# Patient Record
Sex: FEMALE | Race: WHITE | Employment: OTHER | URBAN - NONMETROPOLITAN AREA
[De-identification: names, ages, dates, MRNs, and addresses within clinical notes are randomized per-mention and may not be internally consistent; named-entity substitution may affect disease eponyms.]

---

## 2017-01-09 ENCOUNTER — TELEPHONE (OUTPATIENT)
Dept: PRIMARY CARE CLINIC | Age: 57
End: 2017-01-09

## 2017-01-11 ENCOUNTER — HOSPITAL ENCOUNTER (OUTPATIENT)
Dept: WOMENS IMAGING | Age: 57
Discharge: HOME | End: 2017-01-11
Payer: MEDICAID

## 2017-01-11 DIAGNOSIS — Z12.31 SCREENING MAMMOGRAM, ENCOUNTER FOR: ICD-10-CM

## 2017-01-11 PROCEDURE — 77063 BREAST TOMOSYNTHESIS BI: CPT

## 2017-03-13 ENCOUNTER — OFFICE VISIT (OUTPATIENT)
Dept: PRIMARY CARE CLINIC | Age: 57
End: 2017-03-13
Payer: MEDICAID

## 2017-03-13 VITALS
OXYGEN SATURATION: 94 % | HEART RATE: 88 BPM | DIASTOLIC BLOOD PRESSURE: 86 MMHG | BODY MASS INDEX: 27.38 KG/M2 | WEIGHT: 135.8 LBS | SYSTOLIC BLOOD PRESSURE: 124 MMHG | HEIGHT: 59 IN

## 2017-03-13 DIAGNOSIS — I10 ESSENTIAL HYPERTENSION: ICD-10-CM

## 2017-03-13 DIAGNOSIS — E11.39 TYPE 2 DIABETES MELLITUS WITH OTHER OPHTHALMIC COMPLICATION, UNSPECIFIED LONG TERM INSULIN USE STATUS: ICD-10-CM

## 2017-03-13 DIAGNOSIS — N18.4 CKD (CHRONIC KIDNEY DISEASE), STAGE IV (HCC): Primary | ICD-10-CM

## 2017-03-13 LAB — HBA1C MFR BLD: 6.9 %

## 2017-03-13 PROCEDURE — 83036 HEMOGLOBIN GLYCOSYLATED A1C: CPT | Performed by: FAMILY MEDICINE

## 2017-03-13 PROCEDURE — 99214 OFFICE O/P EST MOD 30 MIN: CPT | Performed by: FAMILY MEDICINE

## 2017-03-13 RX ORDER — CALCIUM CARBONATE 200(500)MG
1 TABLET,CHEWABLE ORAL 3 TIMES DAILY
COMMUNITY
End: 2018-02-02

## 2017-03-13 ASSESSMENT — ENCOUNTER SYMPTOMS
COUGH: 0
SHORTNESS OF BREATH: 0

## 2017-05-08 ENCOUNTER — OFFICE VISIT (OUTPATIENT)
Dept: OTOLARYNGOLOGY | Facility: CLINIC | Age: 57
End: 2017-05-08

## 2017-05-08 VITALS — TEMPERATURE: 96.2 F | RESPIRATION RATE: 18 BRPM | HEIGHT: 56 IN | BODY MASS INDEX: 29.92 KG/M2 | WEIGHT: 133 LBS

## 2017-05-08 DIAGNOSIS — H65.33 CHRONIC MUCOID OTITIS MEDIA OF BOTH EARS: ICD-10-CM

## 2017-05-08 DIAGNOSIS — H73.813 ATROPHIC FLACCID TYMPANIC MEMBRANE OF BOTH EARS: Primary | ICD-10-CM

## 2017-05-08 DIAGNOSIS — H69.83 EUSTACHIAN TUBE DYSFUNCTION, BILATERAL: ICD-10-CM

## 2017-05-08 PROCEDURE — 99213 OFFICE O/P EST LOW 20 MIN: CPT | Performed by: PHYSICIAN ASSISTANT

## 2017-05-08 RX ORDER — FLUTICASONE PROPIONATE 50 MCG
SPRAY, SUSPENSION (ML) NASAL
Refills: 3 | Status: ON HOLD | COMMUNITY
Start: 2017-04-28 | End: 2019-09-04

## 2017-05-08 RX ORDER — LIDOCAINE AND PRILOCAINE 25; 25 MG/G; MG/G
CREAM TOPICAL
Refills: 0 | Status: ON HOLD | COMMUNITY
Start: 2017-04-21 | End: 2019-09-04

## 2017-05-12 ENCOUNTER — PREP FOR PROCEDURE (OUTPATIENT)
Dept: VASCULAR SURGERY | Age: 57
End: 2017-05-12

## 2017-05-12 RX ORDER — ONDANSETRON 2 MG/ML
4 INJECTION INTRAMUSCULAR; INTRAVENOUS EVERY 6 HOURS PRN
Status: CANCELLED | OUTPATIENT
Start: 2017-05-12

## 2017-05-12 RX ORDER — ASPIRIN 81 MG/1
81 TABLET ORAL ONCE
Status: CANCELLED | OUTPATIENT
Start: 2017-05-12 | End: 2017-05-12

## 2017-05-12 RX ORDER — VANCOMYCIN HYDROCHLORIDE 1 G/200ML
1000 INJECTION, SOLUTION INTRAVENOUS
Status: CANCELLED | OUTPATIENT
Start: 2017-05-12 | End: 2017-05-12

## 2017-05-12 RX ORDER — SODIUM CHLORIDE 0.9 % (FLUSH) 0.9 %
10 SYRINGE (ML) INJECTION PRN
Status: CANCELLED | OUTPATIENT
Start: 2017-05-12

## 2017-05-12 RX ORDER — SODIUM CHLORIDE 9 MG/ML
INJECTION, SOLUTION INTRAVENOUS CONTINUOUS
Status: CANCELLED | OUTPATIENT
Start: 2017-05-12

## 2017-05-15 ENCOUNTER — HOSPITAL ENCOUNTER (OUTPATIENT)
Dept: INTERVENTIONAL RADIOLOGY/VASCULAR | Age: 57
Discharge: HOME | End: 2017-05-15
Payer: MEDICAID

## 2017-05-15 VITALS
WEIGHT: 132 LBS | HEART RATE: 88 BPM | HEIGHT: 59 IN | TEMPERATURE: 97.8 F | OXYGEN SATURATION: 98 % | DIASTOLIC BLOOD PRESSURE: 65 MMHG | RESPIRATION RATE: 9 BRPM | BODY MASS INDEX: 26.61 KG/M2 | SYSTOLIC BLOOD PRESSURE: 158 MMHG

## 2017-05-15 DIAGNOSIS — N18.4 CKD (CHRONIC KIDNEY DISEASE), STAGE IV (HCC): ICD-10-CM

## 2017-05-15 PROCEDURE — 6360000004 HC RX CONTRAST MEDICATION: Performed by: SURGERY

## 2017-05-15 PROCEDURE — 6360000002 HC RX W HCPCS: Performed by: SURGERY

## 2017-05-15 PROCEDURE — 36901 INTRO CATH DIALYSIS CIRCUIT: CPT | Performed by: SURGERY

## 2017-05-15 PROCEDURE — 2500000003 HC RX 250 WO HCPCS: Performed by: SURGERY

## 2017-05-15 PROCEDURE — 2580000003 HC RX 258: Performed by: SURGERY

## 2017-05-15 RX ORDER — SODIUM CHLORIDE 9 MG/ML
INJECTION, SOLUTION INTRAVENOUS CONTINUOUS
Status: DISCONTINUED | OUTPATIENT
Start: 2017-05-15 | End: 2017-05-17 | Stop reason: HOSPADM

## 2017-05-15 RX ORDER — ACETAMINOPHEN 325 MG/1
650 TABLET ORAL EVERY 4 HOURS PRN
Status: DISCONTINUED | OUTPATIENT
Start: 2017-05-15 | End: 2017-05-17 | Stop reason: HOSPADM

## 2017-05-15 RX ORDER — ONDANSETRON 2 MG/ML
4 INJECTION INTRAMUSCULAR; INTRAVENOUS EVERY 8 HOURS PRN
Status: DISCONTINUED | OUTPATIENT
Start: 2017-05-15 | End: 2017-05-17 | Stop reason: HOSPADM

## 2017-05-15 RX ORDER — LIDOCAINE HYDROCHLORIDE 20 MG/ML
INJECTION, SOLUTION INFILTRATION; PERINEURAL
Status: COMPLETED | OUTPATIENT
Start: 2017-05-15 | End: 2017-05-15

## 2017-05-15 RX ORDER — HYDROCODONE BITARTRATE AND ACETAMINOPHEN 5; 325 MG/1; MG/1
2 TABLET ORAL EVERY 4 HOURS PRN
Status: DISCONTINUED | OUTPATIENT
Start: 2017-05-15 | End: 2017-05-17 | Stop reason: HOSPADM

## 2017-05-15 RX ORDER — ASPIRIN 81 MG/1
81 TABLET ORAL ONCE
Status: DISCONTINUED | OUTPATIENT
Start: 2017-05-15 | End: 2017-05-17 | Stop reason: HOSPADM

## 2017-05-15 RX ORDER — FENTANYL CITRATE 50 UG/ML
INJECTION, SOLUTION INTRAMUSCULAR; INTRAVENOUS
Status: COMPLETED | OUTPATIENT
Start: 2017-05-15 | End: 2017-05-15

## 2017-05-15 RX ORDER — VANCOMYCIN HYDROCHLORIDE 1 G/200ML
1000 INJECTION, SOLUTION INTRAVENOUS
Status: COMPLETED | OUTPATIENT
Start: 2017-05-15 | End: 2017-05-15

## 2017-05-15 RX ORDER — HEPARIN SODIUM 5000 [USP'U]/ML
INJECTION, SOLUTION INTRAVENOUS; SUBCUTANEOUS
Status: COMPLETED | OUTPATIENT
Start: 2017-05-15 | End: 2017-05-15

## 2017-05-15 RX ORDER — SODIUM CHLORIDE 0.9 % (FLUSH) 0.9 %
10 SYRINGE (ML) INJECTION PRN
Status: DISCONTINUED | OUTPATIENT
Start: 2017-05-15 | End: 2017-05-17 | Stop reason: HOSPADM

## 2017-05-15 RX ORDER — ASPIRIN 81 MG/1
81 TABLET ORAL DAILY
COMMUNITY

## 2017-05-15 RX ORDER — ONDANSETRON 2 MG/ML
4 INJECTION INTRAMUSCULAR; INTRAVENOUS EVERY 6 HOURS PRN
Status: DISCONTINUED | OUTPATIENT
Start: 2017-05-15 | End: 2017-05-15

## 2017-05-15 RX ORDER — MIDAZOLAM HYDROCHLORIDE 1 MG/ML
INJECTION INTRAMUSCULAR; INTRAVENOUS
Status: COMPLETED | OUTPATIENT
Start: 2017-05-15 | End: 2017-05-15

## 2017-05-15 RX ORDER — HYDROCODONE BITARTRATE AND ACETAMINOPHEN 5; 325 MG/1; MG/1
1 TABLET ORAL EVERY 4 HOURS PRN
Status: DISCONTINUED | OUTPATIENT
Start: 2017-05-15 | End: 2017-05-17 | Stop reason: HOSPADM

## 2017-05-15 RX ADMIN — VANCOMYCIN HYDROCHLORIDE 1000 MG: 1 INJECTION, SOLUTION INTRAVENOUS at 13:07

## 2017-05-15 RX ADMIN — HEPARIN SODIUM 5000 UNITS: 5000 INJECTION, SOLUTION INTRAVENOUS; SUBCUTANEOUS at 13:10

## 2017-05-15 RX ADMIN — IOVERSOL 10 ML: 678 INJECTION INTRA-ARTERIAL; INTRAVENOUS at 13:19

## 2017-05-15 RX ADMIN — FENTANYL CITRATE 25 MCG: 50 INJECTION INTRAMUSCULAR; INTRAVENOUS at 13:13

## 2017-05-15 RX ADMIN — MIDAZOLAM HYDROCHLORIDE 1 MG: 1 INJECTION, SOLUTION INTRAMUSCULAR; INTRAVENOUS at 13:13

## 2017-05-15 RX ADMIN — SODIUM CHLORIDE: 9 INJECTION, SOLUTION INTRAVENOUS at 12:19

## 2017-05-15 RX ADMIN — LIDOCAINE HYDROCHLORIDE 10 ML: 20 INJECTION, SOLUTION INFILTRATION; PERINEURAL at 13:16

## 2017-05-17 ENCOUNTER — TELEPHONE (OUTPATIENT)
Dept: VASCULAR SURGERY | Age: 57
End: 2017-05-17

## 2017-06-20 ENCOUNTER — TELEPHONE (OUTPATIENT)
Dept: PRIMARY CARE CLINIC | Age: 57
End: 2017-06-20

## 2017-07-05 ENCOUNTER — OFFICE VISIT (OUTPATIENT)
Dept: PRIMARY CARE CLINIC | Age: 57
End: 2017-07-05
Payer: MEDICARE

## 2017-07-05 VITALS
DIASTOLIC BLOOD PRESSURE: 68 MMHG | OXYGEN SATURATION: 92 % | TEMPERATURE: 97.7 F | HEIGHT: 59 IN | WEIGHT: 134 LBS | SYSTOLIC BLOOD PRESSURE: 116 MMHG | HEART RATE: 90 BPM | BODY MASS INDEX: 27.01 KG/M2

## 2017-07-05 DIAGNOSIS — H40.9 GLAUCOMA, UNSPECIFIED GLAUCOMA, UNSPECIFIED LATERALITY: ICD-10-CM

## 2017-07-05 DIAGNOSIS — E11.39 TYPE 2 DIABETES MELLITUS WITH OTHER OPHTHALMIC COMPLICATION, UNSPECIFIED LONG TERM INSULIN USE STATUS: Primary | ICD-10-CM

## 2017-07-05 DIAGNOSIS — I10 ESSENTIAL HYPERTENSION: ICD-10-CM

## 2017-07-05 DIAGNOSIS — E11.21 DIABETIC NEPHROPATHY ASSOCIATED WITH TYPE 2 DIABETES MELLITUS (HCC): ICD-10-CM

## 2017-07-05 LAB — HBA1C MFR BLD: 6.9 %

## 2017-07-05 PROCEDURE — 83036 HEMOGLOBIN GLYCOSYLATED A1C: CPT | Performed by: FAMILY MEDICINE

## 2017-07-05 PROCEDURE — 99214 OFFICE O/P EST MOD 30 MIN: CPT | Performed by: FAMILY MEDICINE

## 2017-07-05 ASSESSMENT — ENCOUNTER SYMPTOMS
COUGH: 0
SHORTNESS OF BREATH: 0

## 2017-09-11 RX ORDER — PEN NEEDLE, DIABETIC 31 GX5/16"
NEEDLE, DISPOSABLE MISCELLANEOUS
Qty: 100 EACH | Refills: 0 | Status: SHIPPED | OUTPATIENT
Start: 2017-09-11 | End: 2017-12-31 | Stop reason: SDUPTHER

## 2017-09-27 ENCOUNTER — LAB REQUISITION (OUTPATIENT)
Dept: LAB | Facility: HOSPITAL | Age: 57
End: 2017-09-27

## 2017-09-27 DIAGNOSIS — Z00.00 ENCOUNTER FOR GENERAL ADULT MEDICAL EXAMINATION WITHOUT ABNORMAL FINDINGS: ICD-10-CM

## 2017-09-28 LAB
LAB AP CASE REPORT: NORMAL
Lab: NORMAL
PATH REPORT.FINAL DX SPEC: NORMAL

## 2017-10-09 ENCOUNTER — OFFICE VISIT (OUTPATIENT)
Dept: PRIMARY CARE CLINIC | Age: 57
End: 2017-10-09
Payer: MEDICARE

## 2017-10-09 VITALS
HEIGHT: 59 IN | BODY MASS INDEX: 27.38 KG/M2 | DIASTOLIC BLOOD PRESSURE: 80 MMHG | SYSTOLIC BLOOD PRESSURE: 122 MMHG | OXYGEN SATURATION: 97 % | WEIGHT: 135.8 LBS | HEART RATE: 90 BPM | TEMPERATURE: 97.1 F

## 2017-10-09 DIAGNOSIS — I10 ESSENTIAL HYPERTENSION: ICD-10-CM

## 2017-10-09 DIAGNOSIS — N18.5 CHRONIC KIDNEY DISEASE, STAGE V (HCC): Primary | ICD-10-CM

## 2017-10-09 DIAGNOSIS — Z72.0 TOBACCO ABUSE: ICD-10-CM

## 2017-10-09 DIAGNOSIS — E11.39 TYPE 2 DIABETES MELLITUS WITH OTHER OPHTHALMIC COMPLICATION, UNSPECIFIED LONG TERM INSULIN USE STATUS: ICD-10-CM

## 2017-10-09 PROCEDURE — G8484 FLU IMMUNIZE NO ADMIN: HCPCS | Performed by: FAMILY MEDICINE

## 2017-10-09 PROCEDURE — 99214 OFFICE O/P EST MOD 30 MIN: CPT | Performed by: FAMILY MEDICINE

## 2017-10-09 PROCEDURE — 3017F COLORECTAL CA SCREEN DOC REV: CPT | Performed by: FAMILY MEDICINE

## 2017-10-09 PROCEDURE — G8427 DOCREV CUR MEDS BY ELIG CLIN: HCPCS | Performed by: FAMILY MEDICINE

## 2017-10-09 PROCEDURE — G8417 CALC BMI ABV UP PARAM F/U: HCPCS | Performed by: FAMILY MEDICINE

## 2017-10-09 PROCEDURE — 3046F HEMOGLOBIN A1C LEVEL >9.0%: CPT | Performed by: FAMILY MEDICINE

## 2017-10-09 PROCEDURE — 3014F SCREEN MAMMO DOC REV: CPT | Performed by: FAMILY MEDICINE

## 2017-10-09 PROCEDURE — 4004F PT TOBACCO SCREEN RCVD TLK: CPT | Performed by: FAMILY MEDICINE

## 2017-10-09 RX ORDER — VARENICLINE TARTRATE 25 MG
KIT ORAL
Qty: 53 TABLET | Refills: 0 | Status: SHIPPED | OUTPATIENT
Start: 2017-10-09 | End: 2018-01-30

## 2017-10-09 RX ORDER — PRAVASTATIN SODIUM 10 MG
TABLET ORAL
Qty: 30 TABLET | Refills: 11 | Status: SHIPPED | OUTPATIENT
Start: 2017-10-09 | End: 2018-09-20 | Stop reason: SDUPTHER

## 2017-10-09 ASSESSMENT — ENCOUNTER SYMPTOMS
COUGH: 0
BLOOD IN STOOL: 0
DIARRHEA: 0
EYE REDNESS: 0
NAUSEA: 0
SHORTNESS OF BREATH: 0
EYE ITCHING: 0
RESPIRATORY NEGATIVE: 1
WHEEZING: 0
SORE THROAT: 0
BACK PAIN: 0
COLOR CHANGE: 0
GASTROINTESTINAL NEGATIVE: 1
RHINORRHEA: 0
EYES NEGATIVE: 1
VOMITING: 0

## 2017-10-09 NOTE — PROGRESS NOTES
Debby Villalpando is a 59 y.o. female  Chief Complaint   Patient presents with    Follow-up     3 months    Diabetes    Hypertension       HPI   Debby Villalpando presents to the office follow-up of diabetes, hypertension, end-stage renal disease and hyperlipidemia. Treatment Adherence:   Medication compliance:  compliant all of the time  Diet compliance:  compliant most of the time  Weight trend: stable  Current exercise: walks occasionally  Barriers: impairment:  physical: especially after dialysis    Diabetes Mellitus Type 2: Current symptoms/problems include none. Home blood sugar records: fasting range:   Any episodes of hypoglycemia? no  Eye exam current (within one year): yes  Tobacco history: She  reports that she has been smoking. She has a 20.00 pack-year smoking history. She has never used smokeless tobacco.   Daily Aspirin? Yes    Hypertension:  Home blood pressure monitoring: yes,  Running low, Stopped losartan. .  She is adherent to a low sodium diet. Patient denies chest pain and shortness of breath. Antihypertensive medication side effects: no medication side effects noted. Use of agents associated with hypertension: none. Hyperlipidemia:  No new myalgias or GI upset on pravastatin (Pravachol). Lab Results   Component Value Date    LABA1C 6.9 07/05/2017    LABA1C 6.9 03/13/2017    LABA1C 8.0 (H) 11/28/2016     Lab Results   Component Value Date    CREATININE 4.5 (H) 11/28/2016     Lab Results   Component Value Date    ALT 9 11/28/2016    AST 15 11/28/2016     Lab Results   Component Value Date    CHOL 191 11/28/2016    TRIG 240 (H) 11/28/2016    HDL 50 (L) 11/28/2016    LDLCALC 93 11/28/2016    LDLDIRECT 120 07/28/2015        Patient does have history of tobacco abuse. Patient states that she wants to quit smoking so she may be able to get a transplant for chronic kidney disease stage V. Review of Systems   Constitutional: Negative.   Negative for activity change, appetite fluticasone (FLONASE) 50 MCG/ACT nasal spray 2 sprays by Nasal route daily. To keep ears opened up 3/27/14  Yes Anat Sosa MD   Blood Glucose Monitoring Suppl NIKOS by Does not apply route. Pt will also need new lancet device if not included in kit. 12/9/13  Yes Anat Sosa MD   Glucose Blood (BLOOD GLUCOSE TEST STRIPS) STRP Check blood sugar twice a day for recent medication adjustments. 12/9/13  Yes Anat Sosa MD   bimatoprost 0.01 % SOLN Apply 1 drop to eye nightly. One drop in left eye at hs   Yes Historical Provider, MD   Insulin Syringe-Needle U-100 (ACCUSURE INS SYR 1CC/31GX5/16\") 31G X 5/16\" 1 ML MISC by Does not apply route.  9/25/12  Yes Anat Sosa MD   timolol (TIMOPTIC-XR) 0.5 % ophthalmic gel-forming Place 1 drop into the left eye 2 times daily  7/3/12  Yes Historical Provider, MD   dorzolamide (TRUSOPT) 2 % ophthalmic solution Place 1 drop into the left eye 2 times daily    Yes Historical Provider, MD   brimonidine (ALPHAGAN P) 0.15 % ophthalmic solution Place 1 drop into the left eye 3 times daily    Yes Historical Provider, MD   homatropine 5 % ophthalmic solution Place 1 drop into the left eye daily    Yes Historical Provider, MD       Past Medical History:   Diagnosis Date    Blind right eye     partial vision loss in the L eye too, due to DM    Blindness of one eye     Right     CKD (chronic kidney disease), stage IV ([address]     secondary to diabetic nephropathy    Diabetes ([address]     Diabetes mellitus with ophthalmic manifestations     dx'd in 2000 but likely ongoing before that, was dx'd when she went blind in the R eye    Glaucoma     Hypertension     2000, dx'd at same time as the DM    Obesity     Renal osteodystrophy     Smoker     Type II or unspecified type diabetes mellitus with renal manifestations, uncontrolled        Past Surgical History:   Procedure Laterality Date    CARPAL TUNNEL RELEASE      CATARACT REMOVAL     [address] Left 4/23/15 [facility]    Left proximal ulnar artery to cephalic vein AVF creation    EYE SURGERY      laser, several times     TYMPANOSTOMY TUBE PLACEMENT Bilateral     VASCULAR SURGERY Left 5/11/15 [facility]    US-guided cannulation left distal upper arm cephalic vein with 4-Chilean glide sheath; LUE AV f'grams with venography SVC; left cephalic vein BAM with [id number] julieth balloon; completion venogram E    VASCULAR SURGERY Left 5/28/15 [facility]    Percutaneous cannulation left distal radial artery with 4-Chilean glide sheath; LUE AV f'grams with venography SVC; left cephalic vein balloon a'plasty with [id number] julieth balloon and [id number] julieth balloon; proximal and mid upper arm cephalic vein BAM with [id number] julieth balloon; completion venogram E    VASCULAR SURGERY Left 6/15/15 [facility]    US-guided cannulation left cephalic vein with 4-Chilean and later 7-Chilean sheath; LUE AV f'grams including venography SVC; left cephalic vein stenosis balloon a'plasty with [id number] cutting balloon; completion f'gram and venogram [facility]    VASCULAR SURGERY  05/15/2017    [facility]. Left upper fistulograms/venograms. Social History     Social History    Marital status:      Spouse name: N/A    Number of children: N/A    Years of education: N/A     Social History Main Topics    Smoking status: Current Every Day Smoker     Packs/day: 0.50     Years: 40.00    Smokeless tobacco: Never Used    Alcohol use No    Drug use:      Types: Marijuana      Comment: Once in a while for vision    Sexual activity: Not Asked     Other Topics Concern    None     Social History Narrative    None       Physical Exam   Constitutional: She is oriented to person, place, and time. She appears well-developed and well-nourished. No distress. HENT:   Head: Normocephalic and atraumatic. Eyes: Conjunctivae are normal. No scleral icterus. Neck: Normal range of motion. Neck supple. No thyromegaly present. Cardiovascular: Normal rate, regular rhythm, normal heart sounds and intact distal pulses. No murmur heard. Pulmonary/Chest: Effort normal and breath sounds normal. No respiratory distress. Musculoskeletal: She exhibits no edema. Neurological: She is alert and oriented to person, place, and time. Skin: Skin is warm and dry. Psychiatric: She has a normal mood and affect. Her behavior is normal.   Nursing note and vitals reviewed. Assessment    ICD-10-CM ICD-9-CM    1. Chronic kidney disease, stage V ([facility] N18.5 585.5 Continue dialysis. Patient is to speak with her nephrologist about possibly adding Midrin drain related to her hypotension following dialysis. 2. Type 2 diabetes mellitus with other ophthalmic complication, unspecified long term insulin use status (HCC) E11.39 250.50 Well-controlled. Continue current medications. 3. Essential hypertension I10 401.9 Patient has been discontinued off losartan related to hypotension. Patient is to continue monitoring blood pressures closely. 4. Tobacco abuse Z72.0 305.1 Approximately 5 minutes of education was provided about quit smoking and the harms of tobacco.  Patient does show understanding. Patient has  the desire to quit smoking in the future. Will start varenicline (CHANTIX STARTING MONTH PAK) 0.5 MG X 11 & 1 MG X 42 tablet and increase to continuing pack if tolerated. Plan      Orders Placed This Encounter   Medications    pravastatin (PRAVACHOL) 10 MG tablet     Sig: TAKE 1 TABLET BY MOUTH EVERY NIGHT AT BEDTIME     Dispense:  30 tablet     Refill:  11    varenicline (CHANTIX STARTING MONTH PAK) 0.5 MG X 11 & 1 MG X 42 tablet     Sig: Take by mouth. Dispense:  53 tablet     Refill:  0       No orders of the defined types were placed in this encounter. Return in about 3 months (around 1/9/2018) for Smoking cessaiton.      Patient Instructions   Chantix is a drug that acts on selective receptors in the brain that reduces cravings and withdrawal symptoms while blocking the effects of nicotine from smoking. Chantix increases the chance of being able to quit smoking. However, some patients develop sever nausea, bad dreams, increased anxiety and depression. There may also be a slight increased risk of heart attack, but cigarettes increase the risk of heart attack, stroke and lung cancer. Call immediately or go to ER if you develop severe symptoms.

## 2017-10-09 NOTE — PATIENT INSTRUCTIONS
Chantix is a drug that acts on selective receptors in the brain that reduces cravings and withdrawal symptoms while blocking the effects of nicotine from smoking. Chantix increases the chance of being able to quit smoking. However, some patients develop sever nausea, bad dreams, increased anxiety and depression. There may also be a slight increased risk of heart attack, but cigarettes increase the risk of heart attack, stroke and lung cancer. Call immediately or go to ER if you develop severe symptoms.

## 2017-11-10 ENCOUNTER — OFFICE VISIT (OUTPATIENT)
Dept: OTOLARYNGOLOGY | Facility: CLINIC | Age: 57
End: 2017-11-10

## 2017-11-10 VITALS
WEIGHT: 139.4 LBS | SYSTOLIC BLOOD PRESSURE: 130 MMHG | HEIGHT: 59 IN | DIASTOLIC BLOOD PRESSURE: 76 MMHG | TEMPERATURE: 97.8 F | BODY MASS INDEX: 28.1 KG/M2

## 2017-11-10 DIAGNOSIS — H69.83 DYSFUNCTION OF BOTH EUSTACHIAN TUBES: ICD-10-CM

## 2017-11-10 DIAGNOSIS — H65.33 CHRONIC MUCOID OTITIS MEDIA OF BOTH EARS: ICD-10-CM

## 2017-11-10 DIAGNOSIS — H73.813 ATROPHIC FLACCID TYMPANIC MEMBRANE OF BOTH EARS: Primary | ICD-10-CM

## 2017-11-10 PROCEDURE — 99213 OFFICE O/P EST LOW 20 MIN: CPT | Performed by: PHYSICIAN ASSISTANT

## 2017-11-10 RX ORDER — MIDODRINE HYDROCHLORIDE 5 MG/1
TABLET ORAL
Refills: 3 | Status: ON HOLD | COMMUNITY
Start: 2017-10-17 | End: 2019-09-04

## 2017-11-10 RX ORDER — CETIRIZINE HYDROCHLORIDE 10 MG/1
10 TABLET ORAL NIGHTLY PRN
COMMUNITY
End: 2020-02-09 | Stop reason: HOSPADM

## 2017-11-10 NOTE — PROGRESS NOTES
Patient Care Team:  Ivan Kelly MD as PCP - General (Family Medicine)  ALANA Jimenes as Physician Assistant (Otolaryngology)  Fuentes Monahan MD as Consulting Physician (Otolaryngology)    Chief complaint: follow-up myringotomy tubes    Subjective     Mini Gentile is a 57 y.o. female who presents status post myringotomy tube insertion. The patient currently has had no related complaints. The patient denies pain, fever, change of hearing and otorrhea. The patient has had three sets of PE tubes over the past several years.     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    • Chronic otitis media    • Diabetes    • Eustachian tube dysfunction    • Glaucoma    • HTN (hypertension)    • Kidney failure     on dialysis   • Liver disorder    • Mucoid otitis media      Past Surgical History:   Procedure Laterality Date   • ARTERIOVENOUS FISTULA     • CATARACT EXTRACTION WITH INTRAOCULAR LENS IMPLANT     •  SECTION     • CHOLECYSTECTOMY     • MYRINGOTOMY W/ TUBES Bilateral    • MYRINGOTOMY W/ TUBES Right    • TUBAL ABDOMINAL LIGATION       Family History   Problem Relation Age of Onset   • Heart disease Mother      Social History   Substance Use Topics   • Smoking status: Former Smoker     Packs/day: 1.00     Quit date: 10/27/2017   • Smokeless tobacco: Never Used   • Alcohol use No       Current Outpatient Prescriptions:   •  aspirin 81 MG EC tablet, Take 81 mg by mouth Daily., Disp: , Rfl:   •  bimatoprost (LUMIGAN) 0.01 % ophthalmic drops, Administer 1 drop into the left eye Every Night., Disp: , Rfl:   •  brimonidine (ALPHAGAN) 0.2 % ophthalmic solution, Administer 1 drop into the left eye Daily., Disp: , Rfl:   •  calcium carbonate (TUMS) 500 MG chewable tablet, Chew 1 tablet 3 (Three) Times a Day., Disp: , Rfl:   •   Cetirizine HCl 10 MG capsule, Take 10 mg by mouth., Disp: , Rfl:   •  dorzolamide (TRUSOPT) 2 % ophthalmic solution, Administer 1 drop into the left eye 2 (Two) Times a Day., Disp: , Rfl:   •  fluticasone (FLONASE) 50 MCG/ACT nasal spray, SHAKE LQ AND U 2 SPRAYS IEN D, Disp: , Rfl: 3  •  homatropine 5 % ophthalmic solution, Administer 1 drop into the left eye Daily., Disp: , Rfl:   •  insulin glargine (LANTUS) 100 UNIT/ML injection, Inject 10 Units under the skin Every Night., Disp: , Rfl:   •  lidocaine-prilocaine (EMLA) 2.5-2.5 % cream, APPLY TO ACCESS AREA 30 MINUTES PRIOR TO HEMODIALYSIS THREE TIMES WEEKLY, Disp: , Rfl: 0  •  midodrine (PROAMATINE) 5 MG tablet, TK 1 T PO HALF WAY THROUGH TREATMENT AS DIRECTED, Disp: , Rfl: 3  •  pravastatin (PRAVACHOL) 10 MG tablet, Take 10 mg by mouth Every Night., Disp: , Rfl:   •  raNITIdine (ZANTAC) 150 MG tablet, Take 150 mg by mouth 2 (Two) Times a Day., Disp: , Rfl:   •  timolol (TIMOPTIC-XR) 0.5 % ophthalmic gel-forming, Administer 1 drop into the left eye 3 (Three) Times a Day., Disp: , Rfl:   •  benzonatate (TESSALON) 100 MG capsule, Take 1 capsule by mouth 3 (Three) Times a Day As Needed for Cough., Disp: 30 capsule, Rfl: 0  •  calcitriol (ROCALTROL) 0.25 MCG capsule, Take 0.25 mcg by mouth Daily., Disp: , Rfl:   •  losartan (COZAAR) 100 MG tablet, Take 100 mg by mouth Daily., Disp: , Rfl:   Allergies:  Bupropion; Chantix [varenicline]; Sulfa antibiotics; Azithromycin; and Penicillins    Objective     Vital Signs  Temp:  [97.8 °F (36.6 °C)] 97.8 °F (36.6 °C)  BP: (130)/(76) 130/76    Physical Exam:  CONSTITUTIONAL: well nourished, well-developed, alert, oriented, in no acute distress   COMMUNICATION AND VOICE: able to communicate normally for age, normal voice quality  HEAD: normocephalic, no lesions, atraumatic, no tenderness, no masses   FACE: appearance normal, no lesions, no tenderness, no deformities, facial motion symmetric  EYES: ocular motility normal, eyelids  normal, orbits normal, no proptosis, conjunctiva normal , pupils equal, round   EARS:  Hearing: response to conversational voice normal bilaterally   External Ears: auricles without lesions  Otoscopic: ear canals normal, bilateral myringotomy tubes extruded and removed with curette. TM's retracted R>L  NOSE:  External Nose: structure normal, no tenderness on palpation, no nasal discharge, no lesions, no evidence of trauma, nostrils patent   ORAL:  Lips: upper and lower lips without lesion   NECK: neck appearance normal  CHEST/RESPIRATORY: respiratory effort normal, normal chest excursion  CARDIOVASCULAR: extremities without cyanosis or edema   NEUROLOGIC/PSYCHIATRIC: oriented appropriately, mood normal, affect appropriate, CN II-XII intact grossly    Assessment   1. 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 Chronic mucoid otitis media of both ears    3. Dysfunction of both eustachian tubes        Plan     Will discuss replacement of PE tubes vs T-tubes with Dr. Monahan  I discussed the patients findings and my recommendations and answered questions.     Follow up as directed    ALANA Jimenes  11/10/17  1:52 PM

## 2017-11-10 NOTE — PATIENT INSTRUCTIONS
Bilateral PE tubes extruded with retracted TM's. Could consider T-tubes. Will discuss with Dr. Monahan and call patient with plan.

## 2018-01-01 RX ORDER — PEN NEEDLE, DIABETIC 31 GX5/16"
NEEDLE, DISPOSABLE MISCELLANEOUS
Qty: 100 EACH | Refills: 0 | Status: SHIPPED | OUTPATIENT
Start: 2018-01-01 | End: 2018-01-02 | Stop reason: SDUPTHER

## 2018-01-25 ENCOUNTER — TELEPHONE (OUTPATIENT)
Dept: VASCULAR SURGERY | Age: 58
End: 2018-01-25

## 2018-01-25 NOTE — TELEPHONE ENCOUNTER
Due to decreased access flow Pt has been scheduled for f'gram poss a'plasty/stent. Patrizia at [facility] has been notified of patients upcoming appointment for procedure with [facility] [place]. Patient is to arrive at [address] on Friday 02/02/18 at 7:30am. Details/Instructions (See letter) have been reviewed with Nilay Alvarado and a written copy has been successfully faxed to Nilay Alvarado to review with patient when notifying. Patrizia voiced understanding.  OB

## 2018-01-29 ENCOUNTER — TELEPHONE (OUTPATIENT)
Dept: PRIMARY CARE CLINIC | Age: 58
End: 2018-01-29

## 2018-01-30 ENCOUNTER — OFFICE VISIT (OUTPATIENT)
Dept: PRIMARY CARE CLINIC | Age: 58
End: 2018-01-30
Payer: MEDICARE

## 2018-01-30 VITALS
HEART RATE: 51 BPM | HEIGHT: 59 IN | WEIGHT: 142.6 LBS | DIASTOLIC BLOOD PRESSURE: 68 MMHG | OXYGEN SATURATION: 98 % | TEMPERATURE: 97.7 F | BODY MASS INDEX: 28.75 KG/M2 | SYSTOLIC BLOOD PRESSURE: 122 MMHG

## 2018-01-30 DIAGNOSIS — Z72.0 TOBACCO ABUSE: ICD-10-CM

## 2018-01-30 DIAGNOSIS — Z12.11 COLON CANCER SCREENING: ICD-10-CM

## 2018-01-30 DIAGNOSIS — I10 ESSENTIAL HYPERTENSION: ICD-10-CM

## 2018-01-30 DIAGNOSIS — E11.21 DIABETIC NEPHROPATHY ASSOCIATED WITH TYPE 2 DIABETES MELLITUS (HCC): Primary | ICD-10-CM

## 2018-01-30 DIAGNOSIS — E11.39 TYPE 2 DIABETES MELLITUS WITH OTHER OPHTHALMIC COMPLICATION, UNSPECIFIED LONG TERM INSULIN USE STATUS: ICD-10-CM

## 2018-01-30 DIAGNOSIS — N18.5 CHRONIC KIDNEY DISEASE, STAGE V (HCC): ICD-10-CM

## 2018-01-30 PROCEDURE — 3014F SCREEN MAMMO DOC REV: CPT | Performed by: FAMILY MEDICINE

## 2018-01-30 PROCEDURE — 3017F COLORECTAL CA SCREEN DOC REV: CPT | Performed by: FAMILY MEDICINE

## 2018-01-30 PROCEDURE — G8427 DOCREV CUR MEDS BY ELIG CLIN: HCPCS | Performed by: FAMILY MEDICINE

## 2018-01-30 PROCEDURE — 3046F HEMOGLOBIN A1C LEVEL >9.0%: CPT | Performed by: FAMILY MEDICINE

## 2018-01-30 PROCEDURE — 99214 OFFICE O/P EST MOD 30 MIN: CPT | Performed by: FAMILY MEDICINE

## 2018-01-30 PROCEDURE — 4004F PT TOBACCO SCREEN RCVD TLK: CPT | Performed by: FAMILY MEDICINE

## 2018-01-30 PROCEDURE — G8417 CALC BMI ABV UP PARAM F/U: HCPCS | Performed by: FAMILY MEDICINE

## 2018-01-30 PROCEDURE — G8484 FLU IMMUNIZE NO ADMIN: HCPCS | Performed by: FAMILY MEDICINE

## 2018-01-30 RX ORDER — SEVELAMER CARBONATE 800 MG/1
1 TABLET, FILM COATED ORAL DAILY
COMMUNITY

## 2018-01-30 RX ORDER — MIDODRINE HYDROCHLORIDE 5 MG/1
TABLET ORAL
COMMUNITY
Start: 2017-10-17

## 2018-01-30 RX ORDER — INSULIN GLARGINE 100 [IU]/ML
10 INJECTION, SOLUTION SUBCUTANEOUS
COMMUNITY
End: 2019-02-25

## 2018-01-30 RX ORDER — TRAVOPROST OPHTHALMIC SOLUTION 0.04 MG/ML
1 SOLUTION OPHTHALMIC NIGHTLY
COMMUNITY

## 2018-01-30 ASSESSMENT — PATIENT HEALTH QUESTIONNAIRE - PHQ9
SUM OF ALL RESPONSES TO PHQ9 QUESTIONS 1 & 2: 0
SUM OF ALL RESPONSES TO PHQ QUESTIONS 1-9: 0
1. LITTLE INTEREST OR PLEASURE IN DOING THINGS: 0
2. FEELING DOWN, DEPRESSED OR HOPELESS: 0

## 2018-01-30 ASSESSMENT — ENCOUNTER SYMPTOMS
COLOR CHANGE: 0
SHORTNESS OF BREATH: 0
COUGH: 0

## 2018-01-30 NOTE — PROGRESS NOTES
Rossy Rodriguez is a 59 y.o. female    Chief Complaint   Patient presents with    Follow-up     3 months       HPI Rossy Rodriguez presents to the office for follow-up of diabetes type II, tobacco abuse, hypertension, end-stage renal disease. Patient is currently seeing dialysis 3 days a week. Patient also has a long-standing history of tobacco abuse. Patient recently tried Chantix. Patient was unable to tolerate Chantix because it made her sick to her stomach. Patient states she knows she needs to quit but she is not interested in trying any other medications. Treatment Adherence:   Medication compliance:  compliant all of the time  Diet compliance:  compliant most of the time  Weight trend: stable  Current exercise: no regular exercise  Barriers: none    Diabetes Mellitus Type 2: Current symptoms/problems include nephropathy and visual problems- blind in right eye). Home blood sugar records: fasting range:   Any episodes of hypoglycemia? no  Eye exam current (within one year): yes, has  Tobacco history: She  reports that she has been smoking. She has a 20.00 pack-year smoking history. She has never used smokeless tobacco.   Daily Aspirin? Yes    Hypertension:  Home blood pressure monitoring: No.  She is adherent to a low sodium diet. Patient denies chest pain and shortness of breath. Antihypertensive medication side effects: no medication side effects noted. Use of agents associated with hypertension: none.      Patient also has history of   Lab Results   Component Value Date    LABA1C 6.9 07/05/2017    LABA1C 6.9 03/13/2017    LABA1C 8.0 (H) 11/28/2016     Lab Results   Component Value Date    CREATININE 4.5 (H) 11/28/2016     Lab Results   Component Value Date    ALT 9 11/28/2016    AST 15 11/28/2016     Lab Results   Component Value Date    CHOL 191 11/28/2016    TRIG 240 (H) 11/28/2016    HDL 50 (L) 11/28/2016    LDLCALC 93 11/28/2016    LDLDIRECT 120 07/28/2015          Review of Systems Constitutional: Negative for chills and fever. Respiratory: Negative for cough and shortness of breath. Cardiovascular: Negative for chest pain and leg swelling. Skin: Negative for color change and rash. Psychiatric/Behavioral: Negative for dysphoric mood. The patient is not nervous/anxious. Prior to Admission medications    Medication Sig Start Date End Date Taking? Authorizing Provider   Travoprost, ALLISON Free, (TRAVATAN Z) 0.004 % SOLN ophthalmic solution 1 drop nightly   Yes Historical Provider, MD   insulin glargine (LANTUS) 100 UNIT/ML injection vial Inject 10 Units into the skin   Yes Historical Provider, MD   midodrine (PROAMATINE) 5 MG tablet TK 1 T PO HALF WAY THROUGH TREATMENT AS DIRECTED 10/17/17  Yes Historical Provider, MD   sevelamer (RENVELA) 800 MG tablet Take 1 tablet by mouth 3 times daily (with meals)   Yes Historical Provider, MD   Insulin Pen Needle (B-D ULTRAFINE III SHORT PEN) 31G X 8 MM MISC USE TO INJECT INSULIN ONCE A DAY 1/2/18  Yes Electa Meter, MD   pravastatin (PRAVACHOL) 10 MG tablet TAKE 1 TABLET BY MOUTH EVERY NIGHT AT BEDTIME 10/9/17  Yes Electa Meter, MD   aspirin EC 81 MG EC tablet Take 81 mg by mouth daily   Yes Historical Provider, MD   calcium carbonate (TUMS) 500 MG chewable tablet Take 1 tablet by mouth 3 times daily Indications: with meals    Yes Historical Provider, MD   FREESTYLE LITE strip USE TO TEST BLOOD SUGAR TWICE DAILY 12/29/16  Yes Electa Meter, MD   Cetirizine HCl (ZYRTEC ALLERGY PO) Take 10 mg by mouth daily    Yes Historical Provider, MD   fluticasone (FLONASE) 50 MCG/ACT nasal spray 2 sprays by Nasal route daily. To keep ears opened up 3/27/14  Yes Maryjo Verma MD   Blood Glucose Monitoring Suppl NIKOS by Does not apply route. Pt will also need new lancet device if not included in kit.  12/9/13  Yes Maryjo Verma MD   Glucose Blood (BLOOD GLUCOSE TEST STRIPS) STRP Check blood sugar twice a day for recent medication

## 2018-02-02 ENCOUNTER — HOSPITAL ENCOUNTER (OUTPATIENT)
Dept: INTERVENTIONAL RADIOLOGY/VASCULAR | Age: 58
Discharge: HOME | End: 2018-02-02
Payer: MEDICARE

## 2018-02-02 ENCOUNTER — PREP FOR PROCEDURE (OUTPATIENT)
Dept: VASCULAR SURGERY | Age: 58
End: 2018-02-02

## 2018-02-02 VITALS
BODY MASS INDEX: 28.63 KG/M2 | TEMPERATURE: 98.7 F | RESPIRATION RATE: 15 BRPM | OXYGEN SATURATION: 98 % | WEIGHT: 142 LBS | SYSTOLIC BLOOD PRESSURE: 138 MMHG | HEART RATE: 90 BPM | HEIGHT: 59 IN | DIASTOLIC BLOOD PRESSURE: 42 MMHG

## 2018-02-02 DIAGNOSIS — N18.5 CKD (CHRONIC KIDNEY DISEASE), STAGE V (HCC): ICD-10-CM

## 2018-02-02 PROBLEM — N18.6 END STAGE RENAL DISEASE (HCC): Status: ACTIVE | Noted: 2018-02-02

## 2018-02-02 PROCEDURE — 99153 MOD SED SAME PHYS/QHP EA: CPT

## 2018-02-02 PROCEDURE — 6360000002 HC RX W HCPCS: Performed by: SURGERY

## 2018-02-02 PROCEDURE — 2500000003 HC RX 250 WO HCPCS: Performed by: SURGERY

## 2018-02-02 PROCEDURE — 6370000000 HC RX 637 (ALT 250 FOR IP): Performed by: NURSE PRACTITIONER

## 2018-02-02 PROCEDURE — 36901 INTRO CATH DIALYSIS CIRCUIT: CPT | Performed by: SURGERY

## 2018-02-02 PROCEDURE — 6360000004 HC RX CONTRAST MEDICATION: Performed by: SURGERY

## 2018-02-02 PROCEDURE — C1725 CATH, TRANSLUMIN NON-LASER: HCPCS

## 2018-02-02 PROCEDURE — 99152 MOD SED SAME PHYS/QHP 5/>YRS: CPT

## 2018-02-02 PROCEDURE — 36901 INTRO CATH DIALYSIS CIRCUIT: CPT

## 2018-02-02 PROCEDURE — 6360000002 HC RX W HCPCS: Performed by: NURSE PRACTITIONER

## 2018-02-02 PROCEDURE — 2580000003 HC RX 258: Performed by: NURSE PRACTITIONER

## 2018-02-02 PROCEDURE — 36909 DIALYSIS CIRCUIT EMBOLJ: CPT

## 2018-02-02 PROCEDURE — 36909 DIALYSIS CIRCUIT EMBOLJ: CPT | Performed by: SURGERY

## 2018-02-02 RX ORDER — SODIUM CHLORIDE 0.9 % (FLUSH) 0.9 %
10 SYRINGE (ML) INJECTION PRN
Status: DISCONTINUED | OUTPATIENT
Start: 2018-02-02 | End: 2018-02-04 | Stop reason: HOSPADM

## 2018-02-02 RX ORDER — CLONIDINE HYDROCHLORIDE 0.1 MG/1
0.1 TABLET ORAL PRN
Status: CANCELLED | OUTPATIENT
Start: 2018-02-02

## 2018-02-02 RX ORDER — ONDANSETRON 2 MG/ML
4 INJECTION INTRAMUSCULAR; INTRAVENOUS EVERY 8 HOURS PRN
Status: DISCONTINUED | OUTPATIENT
Start: 2018-02-02 | End: 2018-02-04 | Stop reason: HOSPADM

## 2018-02-02 RX ORDER — LIDOCAINE HYDROCHLORIDE 20 MG/ML
INJECTION, SOLUTION INFILTRATION; PERINEURAL
Status: COMPLETED | OUTPATIENT
Start: 2018-02-02 | End: 2018-02-02

## 2018-02-02 RX ORDER — MIDAZOLAM HYDROCHLORIDE 1 MG/ML
INJECTION INTRAMUSCULAR; INTRAVENOUS
Status: COMPLETED | OUTPATIENT
Start: 2018-02-02 | End: 2018-02-02

## 2018-02-02 RX ORDER — ACETAMINOPHEN 325 MG/1
650 TABLET ORAL EVERY 4 HOURS PRN
Status: DISCONTINUED | OUTPATIENT
Start: 2018-02-02 | End: 2018-02-04 | Stop reason: HOSPADM

## 2018-02-02 RX ORDER — HEPARIN SODIUM 5000 [USP'U]/ML
INJECTION, SOLUTION INTRAVENOUS; SUBCUTANEOUS
Status: COMPLETED | OUTPATIENT
Start: 2018-02-02 | End: 2018-02-02

## 2018-02-02 RX ORDER — RANITIDINE 150 MG/1
150 TABLET ORAL DAILY
COMMUNITY
End: 2019-01-23 | Stop reason: ALTCHOICE

## 2018-02-02 RX ORDER — ASPIRIN 81 MG/1
81 TABLET ORAL ONCE
Status: COMPLETED | OUTPATIENT
Start: 2018-02-02 | End: 2018-02-02

## 2018-02-02 RX ORDER — FENTANYL CITRATE 50 UG/ML
INJECTION, SOLUTION INTRAMUSCULAR; INTRAVENOUS
Status: COMPLETED | OUTPATIENT
Start: 2018-02-02 | End: 2018-02-02

## 2018-02-02 RX ORDER — ASPIRIN 81 MG/1
81 TABLET ORAL ONCE
Status: CANCELLED | OUTPATIENT
Start: 2018-02-02 | End: 2018-02-02

## 2018-02-02 RX ORDER — SODIUM CHLORIDE 9 MG/ML
INJECTION, SOLUTION INTRAVENOUS CONTINUOUS
Status: DISCONTINUED | OUTPATIENT
Start: 2018-02-02 | End: 2018-02-04 | Stop reason: HOSPADM

## 2018-02-02 RX ORDER — HYDROCODONE BITARTRATE AND ACETAMINOPHEN 5; 325 MG/1; MG/1
1 TABLET ORAL EVERY 4 HOURS PRN
Status: DISCONTINUED | OUTPATIENT
Start: 2018-02-02 | End: 2018-02-04 | Stop reason: HOSPADM

## 2018-02-02 RX ORDER — HYDROCODONE BITARTRATE AND ACETAMINOPHEN 5; 325 MG/1; MG/1
2 TABLET ORAL EVERY 4 HOURS PRN
Status: DISCONTINUED | OUTPATIENT
Start: 2018-02-02 | End: 2018-02-04 | Stop reason: HOSPADM

## 2018-02-02 RX ORDER — CLONIDINE HYDROCHLORIDE 0.1 MG/1
0.1 TABLET ORAL PRN
Status: DISCONTINUED | OUTPATIENT
Start: 2018-02-02 | End: 2018-02-04 | Stop reason: HOSPADM

## 2018-02-02 RX ORDER — VANCOMYCIN HYDROCHLORIDE 1 G/200ML
1000 INJECTION, SOLUTION INTRAVENOUS
Status: COMPLETED | OUTPATIENT
Start: 2018-02-02 | End: 2018-02-02

## 2018-02-02 RX ORDER — VANCOMYCIN HYDROCHLORIDE 1 G/200ML
1000 INJECTION, SOLUTION INTRAVENOUS
Status: CANCELLED | OUTPATIENT
Start: 2018-02-02 | End: 2018-02-02

## 2018-02-02 RX ORDER — SODIUM CHLORIDE 9 MG/ML
INJECTION, SOLUTION INTRAVENOUS CONTINUOUS
Status: CANCELLED | OUTPATIENT
Start: 2018-02-02

## 2018-02-02 RX ORDER — SODIUM CHLORIDE 0.9 % (FLUSH) 0.9 %
10 SYRINGE (ML) INJECTION PRN
Status: CANCELLED | OUTPATIENT
Start: 2018-02-02

## 2018-02-02 RX ADMIN — FENTANYL CITRATE 25 MCG: 50 INJECTION, SOLUTION INTRAMUSCULAR; INTRAVENOUS at 10:22

## 2018-02-02 RX ADMIN — ASPIRIN 81 MG: 81 TABLET, COATED ORAL at 08:17

## 2018-02-02 RX ADMIN — IOPAMIDOL 40 ML: 612 INJECTION, SOLUTION INTRATHECAL at 10:55

## 2018-02-02 RX ADMIN — SODIUM CHLORIDE: 9 INJECTION, SOLUTION INTRAVENOUS at 08:15

## 2018-02-02 RX ADMIN — FENTANYL CITRATE 25 MCG: 50 INJECTION, SOLUTION INTRAMUSCULAR; INTRAVENOUS at 10:43

## 2018-02-02 RX ADMIN — MIDAZOLAM HYDROCHLORIDE 1 MG: 1 INJECTION, SOLUTION INTRAMUSCULAR; INTRAVENOUS at 10:43

## 2018-02-02 RX ADMIN — MIDAZOLAM HYDROCHLORIDE 1 MG: 1 INJECTION, SOLUTION INTRAMUSCULAR; INTRAVENOUS at 10:22

## 2018-02-02 RX ADMIN — VANCOMYCIN HYDROCHLORIDE 1000 MG: 1 INJECTION, SOLUTION INTRAVENOUS at 10:02

## 2018-02-02 RX ADMIN — HEPARIN SODIUM 5000 UNITS: 5000 INJECTION, SOLUTION INTRAVENOUS; SUBCUTANEOUS at 10:12

## 2018-02-02 RX ADMIN — LIDOCAINE HYDROCHLORIDE 10 ML: 20 INJECTION, SOLUTION INFILTRATION; PERINEURAL at 10:27

## 2018-02-02 NOTE — DISCHARGE INSTR - DIET

## 2018-02-03 NOTE — OP NOTE
[facility] [address] [facility]                                 OPERATIVE REPORT    PATIENT NAME: Tiffany Garg                   :        1960  MED REC NO:   [id number]                              ROOM:  ACCOUNT NO:   [de-identified]                           ADMIT DATE: 2018  PROVIDER:     Shaun Nolen MD      DATE OF PROCEDURE:  2018    PREOPERATIVE DIAGNOSIS:  CKD, stage V with poor fistula flow. POSTOPERATIVE DIAGNOSIS:  CKD, stage V with poor fistula flow. PROCEDURES PERFORMED:  1. Fistulography with fistulogram from proximal anastomosis to central  venous circulation. 2.  Coil side branch embolization of a large draining side branch from mid  portion of fistula. SURGEON:  Chinmay Mcgowan MD    ANESTHESIA:  Local with sedation. BLOOD LOSS:  Minimal.    FLUIDS:  Minimal.    CONTRAST:  40 mL Isovue. FLUORO TIME:  5 minutes. FINDINGS:  A large draining side branch, no other evidence of arterial or  venous stenosis. OPERATIVE PROCEDURE:  The patient was identified in the special suite,  prepped and draped in sterile fashion. A 4-Turkmen sheath was placed under  ultrasound guidance going towards the proximal circulation. The patient  had some collaterals and also decreased flows in her fistula, so concern  for venous stenosis or arterial stenosis or draining side branch. So we  started out with a fistulogram.  The cephalic vein was widely patent and at  least 6 mm throughout its course. There was no stenosis into the central  circulation, which was normal.  There was one large draining side branch,  which went all the way down in the forearm. Reflux fistulography showed no  significant stenosis, so at this point I decided to embolize this large  side branch.   We are able to engage the large side branch with a RIM  catheter and Glidewire, confirmed location in the side branch, and an 8 x 5  coil followed by two 5 x 5 coils were placed. The tail of one of the coils  went close to the main cephalic trunk, so I placed an 8 x 4 mm balloon  across this and inflated, so it did not appear that the tail of the coil  was in the vein. Completion films revealed excellent anatomic results and clinically the  thrill was improved. Sheath was removed. Pressure was held. The patient tolerated the procedure well to recovery in stable condition.         Miranda Thakkar MD    D: 02/02/2018 14:57:07       T: 02/02/2018 20:37:01     [email or web address]  Job#: [id number]     Doc#: [id number]    CC:    Laurie Manley MD

## 2018-02-08 ENCOUNTER — TELEPHONE (OUTPATIENT)
Dept: VASCULAR SURGERY | Age: 58
End: 2018-02-08

## 2018-02-09 ENCOUNTER — OFFICE VISIT (OUTPATIENT)
Dept: VASCULAR SURGERY | Age: 58
End: 2018-02-09
Payer: MEDICARE

## 2018-02-09 ENCOUNTER — PREP FOR PROCEDURE (OUTPATIENT)
Dept: VASCULAR SURGERY | Age: 58
End: 2018-02-09

## 2018-02-09 VITALS
HEART RATE: 86 BPM | RESPIRATION RATE: 18 BRPM | SYSTOLIC BLOOD PRESSURE: 128 MMHG | DIASTOLIC BLOOD PRESSURE: 84 MMHG | OXYGEN SATURATION: 98 % | TEMPERATURE: 97.6 F

## 2018-02-09 DIAGNOSIS — N18.6 END STAGE RENAL DISEASE (HCC): Primary | ICD-10-CM

## 2018-02-09 PROCEDURE — 4004F PT TOBACCO SCREEN RCVD TLK: CPT | Performed by: NURSE PRACTITIONER

## 2018-02-09 PROCEDURE — G8484 FLU IMMUNIZE NO ADMIN: HCPCS | Performed by: NURSE PRACTITIONER

## 2018-02-09 PROCEDURE — 3017F COLORECTAL CA SCREEN DOC REV: CPT | Performed by: NURSE PRACTITIONER

## 2018-02-09 PROCEDURE — 3014F SCREEN MAMMO DOC REV: CPT | Performed by: NURSE PRACTITIONER

## 2018-02-09 PROCEDURE — G8427 DOCREV CUR MEDS BY ELIG CLIN: HCPCS | Performed by: NURSE PRACTITIONER

## 2018-02-09 PROCEDURE — 99213 OFFICE O/P EST LOW 20 MIN: CPT | Performed by: NURSE PRACTITIONER

## 2018-02-09 PROCEDURE — G8417 CALC BMI ABV UP PARAM F/U: HCPCS | Performed by: NURSE PRACTITIONER

## 2018-02-09 RX ORDER — SODIUM CHLORIDE 9 MG/ML
INJECTION, SOLUTION INTRAVENOUS CONTINUOUS
Status: CANCELLED | OUTPATIENT
Start: 2018-02-09

## 2018-02-09 RX ORDER — VANCOMYCIN HYDROCHLORIDE 1 G/200ML
1000 INJECTION, SOLUTION INTRAVENOUS
Status: CANCELLED | OUTPATIENT
Start: 2018-02-09 | End: 2018-02-09

## 2018-02-09 RX ORDER — ASPIRIN 81 MG/1
81 TABLET ORAL ONCE
Status: CANCELLED | OUTPATIENT
Start: 2018-02-09 | End: 2018-02-09

## 2018-02-09 RX ORDER — SODIUM CHLORIDE 0.9 % (FLUSH) 0.9 %
10 SYRINGE (ML) INJECTION PRN
Status: CANCELLED | OUTPATIENT
Start: 2018-02-09

## 2018-02-09 RX ORDER — CLONIDINE HYDROCHLORIDE 0.1 MG/1
0.1 TABLET ORAL PRN
Status: CANCELLED | OUTPATIENT
Start: 2018-02-09

## 2018-02-09 NOTE — H&P
Patient Care Team:  Randa Goddard MD as PCP - General (Family Medicine)  Randa Goddard MD as PCP - [facility] Attributed Provider  Lexii Bueno MD (Nephrology)  Elena Coon MD (Optometry)  Rosa Hernandez PA-C (Physician Assistant)      History and Physical    She has a history of chronic kidney disease for a duration of 1 - 5 years. This is believed to be caused by unknown etiology. She has experienced pain during the last 2 treatments with what appears to be infiltrations and hematoma development. She is now stage V and is on hemodialysis. She has had previous  Left AV fistula.      Sudhir Welch is a 59 y.o. female with the following history [facility] and [facility]:  Patient Active Problem List    Diagnosis Date Noted    End stage renal disease ([facility] [address] 02/02/2018    Breast density 12/28/2015    CKD (chronic kidney disease), stage IV ([address] 04/15/2015    Diabetic nephropathy ([facility] [address] 04/15/2015    Glaucoma 12/03/2013    HTN (hypertension) 04/18/2013    CKD (chronic kidney disease), stage V ([address] 08/13/2012    Arthropathy 08/13/2012    Proteinuria 08/13/2012    Disorder of phosphorus metabolism 08/13/2012    Diabetes mellitus with ophthalmic complication ([facility] [address]      dx'd in 2000 but likely ongoing before that, was dx'd when she went blind in the R eye  Replacing Inactive Diagnoses       Current Outpatient Prescriptions   Medication Sig Dispense Refill    ranitidine (ZANTAC) 150 MG tablet Take 150 mg by mouth daily      Travoprost, ALLISON Free, (TRAVATAN Z) 0.004 % SOLN ophthalmic solution 1 drop nightly      insulin glargine (LANTUS) 100 UNIT/ML injection vial Inject 10 Units into the skin      midodrine (PROAMATINE) 5 MG tablet TK 1 T PO HALF WAY THROUGH TREATMENT AS DIRECTED      sevelamer (RENVELA) 800 MG tablet Take 3 tablets by mouth 3 times daily (with meals)       Insulin Pen Needle (B-D ULTRAFINE III SHORT PEN) 31G X 8 MM MISC USE TO INJECT INSULIN ONCE A  each 0  pravastatin (PRAVACHOL) 10 MG tablet TAKE 1 TABLET BY MOUTH EVERY NIGHT AT BEDTIME 30 tablet 11    aspirin EC 81 MG EC tablet Take 81 mg by mouth daily      FREESTYLE LITE strip USE TO TEST BLOOD SUGAR TWICE DAILY 100 strip 0    Cetirizine HCl (ZYRTEC ALLERGY PO) Take 10 mg by mouth daily       fluticasone (FLONASE) 50 MCG/ACT nasal spray 2 sprays by Nasal route daily. To keep ears opened up 1 Bottle 11    Blood Glucose Monitoring Suppl NIKOS by Does not apply route. Pt will also need new lancet device if not included in kit. 1 Device 0    Glucose Blood (BLOOD GLUCOSE TEST STRIPS) STRP Check blood sugar twice a day for recent medication adjustments. 100 strip 11    bimatoprost 0.01 % SOLN Apply 1 drop to eye nightly. One drop in left eye at hs      Insulin Syringe-Needle U-100 (ACCUSURE INS SYR 1CC/31GX5/16\") 31G X 5/16\" 1 ML MISC by Does not apply route. 100 each 3    timolol (TIMOPTIC-XR) 0.5 % ophthalmic gel-forming Place 1 drop into the left eye 2 times daily       dorzolamide (TRUSOPT) 2 % ophthalmic solution Place 1 drop into the left eye 2 times daily       brimonidine (ALPHAGAN P) 0.15 % ophthalmic solution Place 1 drop into the left eye 3 times daily       homatropine 5 % ophthalmic solution Place 1 drop into the left eye daily        No current facility-administered medications for this visit. Allergies: Sulfa antibiotics; Wellbutrin [bupropion hcl];  Azithromycin; and Penicillins  Past Medical History:   Diagnosis Date    Blind right eye     partial vision loss in the L eye too, due to DM    Blindness of one eye     Right     CKD (chronic kidney disease), stage IV ([facility])     secondary to diabetic nephropathy    Diabetes ([facility] [address]     Diabetes mellitus with ophthalmic manifestations     dx'd in 2000 but likely ongoing before that, was dx'd when she went blind in the R eye    Glaucoma     Hemodialysis patient ([facility] [address]     dialysis tues, thurs, sat. [address], [facility]   Blaine Acostar -  No cyanosis, clubbing, or significant edema. No signs atheroembolic event. Bruit audible - yes, thrill palpalbe - yes. Pulmonary - effort appears normal.  No respiratory distress. Lungs - Breath sounds normal. No wheezes or rales. GI - Abdomen - soft, non tender, bowel sounds X 4 quadrants. No guarding or rebound tenderness. No distension or palpable mass. Genitourinary - deferred. Musculoskeletal - ROM appears normal.  No significant edema. Neurologic - alert and oriented X 3. Physiologic. Skin - warm, dry, and intact. No rash, erythema, or pallor. Psychiatric - mood, affect, and behavior appear normal.  Judgment and thought processes appear normal.      Options have been discussed with the patient including continued medical management and fistulogram with possible a'plasty. Patient has opted to proceed with fistulogram with possible angioplasty. Risks have been discussed with the patient including MI, death, stroke, nerve injury, infection, bleed and steal phenomenon. Assessment    1.  End stage renal disease ([facility] [address]          Plan    Fistulogram with possible angioplasty/stent

## 2018-02-09 NOTE — PROGRESS NOTES
 pravastatin (PRAVACHOL) 10 MG tablet TAKE 1 TABLET BY MOUTH EVERY NIGHT AT BEDTIME 30 tablet 11    aspirin EC 81 MG EC tablet Take 81 mg by mouth daily      FREESTYLE LITE strip USE TO TEST BLOOD SUGAR TWICE DAILY 100 strip 0    Cetirizine HCl (ZYRTEC ALLERGY PO) Take 10 mg by mouth daily       fluticasone (FLONASE) 50 MCG/ACT nasal spray 2 sprays by Nasal route daily. To keep ears opened up 1 Bottle 11    Blood Glucose Monitoring Suppl NIKOS by Does not apply route. Pt will also need new lancet device if not included in kit. 1 Device 0    Glucose Blood (BLOOD GLUCOSE TEST STRIPS) STRP Check blood sugar twice a day for recent medication adjustments. 100 strip 11    bimatoprost 0.01 % SOLN Apply 1 drop to eye nightly. One drop in left eye at hs      Insulin Syringe-Needle U-100 (ACCUSURE INS SYR 1CC/31GX5/16\") 31G X 5/16\" 1 ML MISC by Does not apply route. 100 each 3    timolol (TIMOPTIC-XR) 0.5 % ophthalmic gel-forming Place 1 drop into the left eye 2 times daily       dorzolamide (TRUSOPT) 2 % ophthalmic solution Place 1 drop into the left eye 2 times daily       brimonidine (ALPHAGAN P) 0.15 % ophthalmic solution Place 1 drop into the left eye 3 times daily       homatropine 5 % ophthalmic solution Place 1 drop into the left eye daily        No current facility-administered medications for this visit. Allergies: Sulfa antibiotics; Wellbutrin [bupropion hcl];  Azithromycin; and Penicillins  Past Medical History:   Diagnosis Date    Blind right eye     partial vision loss in the L eye too, due to DM    Blindness of one eye     Right     CKD [facility]chronic [facility] IV ([facility]     secondary to diabetic nephropathy    Diabetes ([facility] [address]     Diabetes mellitus with ophthalmic manifestations     dx'd in 2000 but likely ongoing before that, was dx'd when she went blind in the R eye    Glaucoma     Hemodialysis patient ([facility] [address]     dialysis tues, thurs, sat. [address], [facility] Never Used    Alcohol use No         Review of Systems    Constitutional  no significant activity change, appetite change, or unexpected weight change. No fever or chills. No diaphoresis or significant fatigue. HENT  no significant rhinorrhea or epistaxis. No tinnitus or significant hearing loss. Eyes  no sudden vision change or amaurosis. Respiratory  no significant shortness of breath, wheezing, or stridor. No apnea, cough, or chest tightness associated with shortness of breath. Cardiovascular  no chest pain, syncope, or significant dizziness. No palpitations or significant leg swelling. No claudication. no symptoms of steal.  Gastrointestinal  no abdominal swelling or pain. No blood in stool. No severe constipation, diarrhea, nausea, or vomiting. Genitourinary  No dysuria, frequency, or urgency. No flank pain or hematuria. Musculoskeletal  no back pain, gait disturbance, or myalgia. Skin  no color change, rash, pallor, or new wound. Neurologic  no dizziness, facial asymmetry, or light headedness. No seizures. No speech difficulty or lateralizing weakness. Hematologic  no easy bruising or excessive bleeding. Psychiatric  no severe anxiety or nervousness. No confusion. All other review of systems are negative. Physical Exam    /84 (Site: Right Arm, Position: Sitting, Cuff Size: Medium Adult)   Pulse 86   Temp 97.6 °F (36.4 °C) (Temporal)   Resp 18   SpO2 98%     Constitutional  well developed, well nourished. No diaphoresis or acute distress. HENT  head normocephalic. Right external ear canal appears normal.  Left external ear canal appears normal.  Septum appears midline. Eyes  conjunctiva normal.  EOMS normal.  No exudate. No icterus. Neck- ROM appears normal, no tracheal deviation. Cardiovascular  Regular rate and rhythm. Heart sounds are normal.  No murmur, rub, or gallop. Carotid pulses are 2+ to palpation bilaterally without bruit.     Extremities

## 2018-02-14 ENCOUNTER — HOSPITAL ENCOUNTER (OUTPATIENT)
Dept: INTERVENTIONAL RADIOLOGY/VASCULAR | Age: 58
Discharge: HOME | End: 2018-02-14
Payer: MEDICARE

## 2018-02-14 VITALS
OXYGEN SATURATION: 98 % | RESPIRATION RATE: 14 BRPM | BODY MASS INDEX: 31.94 KG/M2 | TEMPERATURE: 98.5 F | WEIGHT: 142 LBS | DIASTOLIC BLOOD PRESSURE: 81 MMHG | HEART RATE: 83 BPM | SYSTOLIC BLOOD PRESSURE: 150 MMHG | HEIGHT: 56 IN

## 2018-02-14 DIAGNOSIS — T82.590D MALFUNCTION OF ARTERIOVENOUS DIALYSIS FISTULA, SUBSEQUENT ENCOUNTER: ICD-10-CM

## 2018-02-14 DIAGNOSIS — N18.6 CKD (CHRONIC KIDNEY DISEASE) STAGE V REQUIRING CHRONIC DIALYSIS (HCC): ICD-10-CM

## 2018-02-14 DIAGNOSIS — Z99.2 CKD (CHRONIC KIDNEY DISEASE) STAGE V REQUIRING CHRONIC DIALYSIS (HCC): ICD-10-CM

## 2018-02-14 PROBLEM — T82.590A DIALYSIS AV FISTULA MALFUNCTION (HCC): Status: ACTIVE | Noted: 2018-02-14

## 2018-02-14 PROCEDURE — 36902 INTRO CATH DIALYSIS CIRCUIT: CPT | Performed by: SURGERY

## 2018-02-14 PROCEDURE — 99153 MOD SED SAME PHYS/QHP EA: CPT | Performed by: SURGERY

## 2018-02-14 PROCEDURE — 36907 BALO ANGIOP CTR DIALYSIS SEG: CPT | Performed by: SURGERY

## 2018-02-14 PROCEDURE — 99152 MOD SED SAME PHYS/QHP 5/>YRS: CPT | Performed by: SURGERY

## 2018-02-14 PROCEDURE — 6360000002 HC RX W HCPCS: Performed by: SURGERY

## 2018-02-14 PROCEDURE — C1769 GUIDE WIRE: HCPCS

## 2018-02-14 PROCEDURE — 6370000000 HC RX 637 (ALT 250 FOR IP): Performed by: NURSE PRACTITIONER

## 2018-02-14 PROCEDURE — 2500000003 HC RX 250 WO HCPCS: Performed by: SURGERY

## 2018-02-14 PROCEDURE — 6360000004 HC RX CONTRAST MEDICATION: Performed by: SURGERY

## 2018-02-14 PROCEDURE — 2580000003 HC RX 258: Performed by: NURSE PRACTITIONER

## 2018-02-14 PROCEDURE — 6360000002 HC RX W HCPCS: Performed by: NURSE PRACTITIONER

## 2018-02-14 RX ORDER — ACETAMINOPHEN 325 MG/1
650 TABLET ORAL EVERY 4 HOURS PRN
Status: DISCONTINUED | OUTPATIENT
Start: 2018-02-14 | End: 2018-02-16 | Stop reason: HOSPADM

## 2018-02-14 RX ORDER — SODIUM CHLORIDE 9 MG/ML
INJECTION, SOLUTION INTRAVENOUS CONTINUOUS
Status: DISCONTINUED | OUTPATIENT
Start: 2018-02-14 | End: 2018-02-16 | Stop reason: HOSPADM

## 2018-02-14 RX ORDER — FENTANYL CITRATE 50 UG/ML
INJECTION, SOLUTION INTRAMUSCULAR; INTRAVENOUS
Status: COMPLETED | OUTPATIENT
Start: 2018-02-14 | End: 2018-02-14

## 2018-02-14 RX ORDER — ASPIRIN 81 MG/1
81 TABLET ORAL ONCE
Status: COMPLETED | OUTPATIENT
Start: 2018-02-14 | End: 2018-02-14

## 2018-02-14 RX ORDER — HYDROCODONE BITARTRATE AND ACETAMINOPHEN 5; 325 MG/1; MG/1
2 TABLET ORAL EVERY 4 HOURS PRN
Status: DISCONTINUED | OUTPATIENT
Start: 2018-02-14 | End: 2018-02-16 | Stop reason: HOSPADM

## 2018-02-14 RX ORDER — CLONIDINE HYDROCHLORIDE 0.1 MG/1
0.1 TABLET ORAL PRN
Status: DISCONTINUED | OUTPATIENT
Start: 2018-02-14 | End: 2018-02-16 | Stop reason: HOSPADM

## 2018-02-14 RX ORDER — SODIUM CHLORIDE 0.9 % (FLUSH) 0.9 %
10 SYRINGE (ML) INJECTION PRN
Status: DISCONTINUED | OUTPATIENT
Start: 2018-02-14 | End: 2018-02-16 | Stop reason: HOSPADM

## 2018-02-14 RX ORDER — LIDOCAINE HYDROCHLORIDE 20 MG/ML
INJECTION, SOLUTION INFILTRATION; PERINEURAL
Status: COMPLETED | OUTPATIENT
Start: 2018-02-14 | End: 2018-02-14

## 2018-02-14 RX ORDER — HYDROCODONE BITARTRATE AND ACETAMINOPHEN 5; 325 MG/1; MG/1
1 TABLET ORAL EVERY 4 HOURS PRN
Status: DISCONTINUED | OUTPATIENT
Start: 2018-02-14 | End: 2018-02-16 | Stop reason: HOSPADM

## 2018-02-14 RX ORDER — MIDAZOLAM HYDROCHLORIDE 1 MG/ML
INJECTION INTRAMUSCULAR; INTRAVENOUS
Status: COMPLETED | OUTPATIENT
Start: 2018-02-14 | End: 2018-02-14

## 2018-02-14 RX ORDER — ONDANSETRON 2 MG/ML
4 INJECTION INTRAMUSCULAR; INTRAVENOUS EVERY 8 HOURS PRN
Status: DISCONTINUED | OUTPATIENT
Start: 2018-02-14 | End: 2018-02-16 | Stop reason: HOSPADM

## 2018-02-14 RX ORDER — VANCOMYCIN HYDROCHLORIDE 1 G/200ML
1000 INJECTION, SOLUTION INTRAVENOUS
Status: COMPLETED | OUTPATIENT
Start: 2018-02-14 | End: 2018-02-14

## 2018-02-14 RX ADMIN — MIDAZOLAM HYDROCHLORIDE 1 MG: 1 INJECTION, SOLUTION INTRAMUSCULAR; INTRAVENOUS at 11:01

## 2018-02-14 RX ADMIN — IOPAMIDOL 30 ML: 612 INJECTION, SOLUTION INTRATHECAL at 11:24

## 2018-02-14 RX ADMIN — FENTANYL CITRATE 25 MCG: 50 INJECTION, SOLUTION INTRAMUSCULAR; INTRAVENOUS at 11:01

## 2018-02-14 RX ADMIN — ASPIRIN 81 MG: 81 TABLET, COATED ORAL at 10:12

## 2018-02-14 RX ADMIN — MIDAZOLAM HYDROCHLORIDE 1 MG: 1 INJECTION, SOLUTION INTRAMUSCULAR; INTRAVENOUS at 11:13

## 2018-02-14 RX ADMIN — VANCOMYCIN HYDROCHLORIDE 1000 MG: 1 INJECTION, SOLUTION INTRAVENOUS at 10:54

## 2018-02-14 RX ADMIN — SODIUM CHLORIDE: 9 INJECTION, SOLUTION INTRAVENOUS at 10:06

## 2018-02-14 RX ADMIN — FENTANYL CITRATE 25 MCG: 50 INJECTION, SOLUTION INTRAMUSCULAR; INTRAVENOUS at 11:13

## 2018-02-14 RX ADMIN — LIDOCAINE HYDROCHLORIDE 10 ML: 20 INJECTION, SOLUTION INFILTRATION; PERINEURAL at 11:08

## 2018-02-14 NOTE — BRIEF OP NOTE
Brief Postoperative Note    Issac Schmitt  YOB: 1960  [id number]    Pre-operative Diagnosis: ESRD, Left upper av fistula malfunction    Post-operative Diagnosis: Same    Procedure: Left upper fistulograms, Left subclavian BA 12x40 atlas, Left cephalic arch BA 73S00 conquest    Anesthesia: Local and Moderate Sedation 2 mg versed, 50 mcg fentanyl    Surgeons/Assistants: Stefan    Estimated Blood Loss: less than 50     Complications: None    Specimens: Was Not Obtained    Findings:  Moderate stenosis left SCV and Cephalic arch     Electronically signed by Dorene Cortez MD on 2/14/2018 at 11:24 AM

## 2018-02-14 NOTE — H&P (VIEW-ONLY)
Never Used    Alcohol use No         Review of Systems    Constitutional  no significant activity change, appetite change, or unexpected weight change. No fever or chills. No diaphoresis or significant fatigue. HENT  no significant rhinorrhea or epistaxis. No tinnitus or significant hearing loss. Eyes  no sudden vision change or amaurosis. Respiratory  no significant shortness of breath, wheezing, or stridor. No apnea, cough, or chest tightness associated with shortness of breath. Cardiovascular  no chest pain, syncope, or significant dizziness. No palpitations or significant leg swelling. No claudication. no symptoms of steal.  Gastrointestinal  no abdominal swelling or pain. No blood in stool. No severe constipation, diarrhea, nausea, or vomiting. Genitourinary  No dysuria, frequency, or urgency. No flank pain or hematuria. Musculoskeletal  no back pain, gait disturbance, or myalgia. Skin  no color change, rash, pallor, or new wound. Neurologic  no dizziness, facial asymmetry, or light headedness. No seizures. No speech difficulty or lateralizing weakness. Hematologic  no easy bruising or excessive bleeding. Psychiatric  no severe anxiety or nervousness. No confusion. All other review of systems are negative. Physical Exam    /84 (Site: Right Arm, Position: Sitting, Cuff Size: Medium Adult)   Pulse 86   Temp 97.6 °F (36.4 °C) (Temporal)   Resp 18   SpO2 98%     Constitutional  well developed, well nourished. No diaphoresis or acute distress. HENT  head normocephalic. Right external ear canal appears normal.  Left external ear canal appears normal.  Septum appears midline. Eyes  conjunctiva normal.  EOMS normal.  No exudate. No icterus. Neck- ROM appears normal, no tracheal deviation. Cardiovascular  Regular rate and rhythm. Heart sounds are normal.  No murmur, rub, or gallop. Carotid pulses are 2+ to palpation bilaterally without bruit.     Extremities -  No cyanosis, clubbing, or significant edema. No signs atheroembolic event. Bruit audible - yes, thrill palpalbe - yes. Pulmonary  effort appears normal.  No respiratory distress. Lungs - Breath sounds normal. No wheezes or rales. GI - Abdomen  soft, non tender, bowel sounds X 4 quadrants. No guarding or rebound tenderness. No distension or palpable mass. Genitourinary  deferred. Musculoskeletal  ROM appears normal.  No significant edema. Neurologic  alert and oriented X 3. Physiologic. Skin  warm, dry, and intact. No rash, erythema, or pallor. Psychiatric  mood, affect, and behavior appear normal.  Judgment and thought processes appear normal.      Options have been discussed with the patient including continued medical management and fistulogram with possible a'plasty. Patient has opted to proceed with fistulogram with possible angioplasty. Risks have been discussed with the patient including MI, death, stroke, nerve injury, infection, bleed and steal phenomenon. Assessment    1.  End stage renal disease ([facility] [address]          Plan    Fistulogram with possible angioplasty/stent

## 2018-02-21 ENCOUNTER — OFFICE VISIT (OUTPATIENT)
Dept: GASTROENTEROLOGY | Age: 58
End: 2018-02-21
Payer: MEDICARE

## 2018-02-21 VITALS
BODY MASS INDEX: 28.3 KG/M2 | OXYGEN SATURATION: 98 % | HEIGHT: 59 IN | DIASTOLIC BLOOD PRESSURE: 60 MMHG | WEIGHT: 140.4 LBS | HEART RATE: 84 BPM | SYSTOLIC BLOOD PRESSURE: 112 MMHG

## 2018-02-21 DIAGNOSIS — Z12.11 ENCOUNTER FOR SCREENING COLONOSCOPY: Primary | ICD-10-CM

## 2018-02-21 DIAGNOSIS — Z99.2 ESRD (END STAGE RENAL DISEASE) ON DIALYSIS (HCC): ICD-10-CM

## 2018-02-21 DIAGNOSIS — N18.6 ESRD (END STAGE RENAL DISEASE) ON DIALYSIS (HCC): ICD-10-CM

## 2018-02-21 PROCEDURE — G8484 FLU IMMUNIZE NO ADMIN: HCPCS | Performed by: NURSE PRACTITIONER

## 2018-02-21 PROCEDURE — 3017F COLORECTAL CA SCREEN DOC REV: CPT | Performed by: NURSE PRACTITIONER

## 2018-02-21 PROCEDURE — G8417 CALC BMI ABV UP PARAM F/U: HCPCS | Performed by: NURSE PRACTITIONER

## 2018-02-21 PROCEDURE — G8427 DOCREV CUR MEDS BY ELIG CLIN: HCPCS | Performed by: NURSE PRACTITIONER

## 2018-02-21 PROCEDURE — 99214 OFFICE O/P EST MOD 30 MIN: CPT | Performed by: NURSE PRACTITIONER

## 2018-02-21 PROCEDURE — 3014F SCREEN MAMMO DOC REV: CPT | Performed by: NURSE PRACTITIONER

## 2018-02-21 PROCEDURE — 4004F PT TOBACCO SCREEN RCVD TLK: CPT | Performed by: NURSE PRACTITIONER

## 2018-02-21 ASSESSMENT — ENCOUNTER SYMPTOMS
SHORTNESS OF BREATH: 0
BACK PAIN: 1
ABDOMINAL PAIN: 0
SORE THROAT: 0
NAUSEA: 0
VOMITING: 0
CONSTIPATION: 0
RECTAL PAIN: 0
ANAL BLEEDING: 0
VOICE CHANGE: 0
DIARRHEA: 0
COUGH: 0
BLOOD IN STOOL: 0
ABDOMINAL DISTENTION: 0
TROUBLE SWALLOWING: 0

## 2018-02-21 NOTE — PATIENT INSTRUCTIONS
You are going to have a colonoscopy and here are some instructions: You will be given specific directions regarding restrictions to diet and bowel prep instructions including laxatives. Please read these instructions one week prior to your scheduled procedure to ensure that you are prepared. Follow prep instructions provided for bowel prep. Take all of the bowel prep as directed. If you are having problems with nausea, stop your prep for 30-45 min to allow the nausea to subside before resuming your prep. Nothing to eat or drink after midnight the day of the procedure EXCEPT:  PLEASE TAKE MEDICATION(S) FOR HIGH BLOOD PRESSURE, THYROID, SEIZURES, AND HEART THE MORNING OF THE PROCEDURE WITH A SIP OF WATER  AT LEAST 2 HOURS PRIOR TO ARRIVAL TIME.   YOU MAY ALSO TAKE ANY INHALERS YOU ARE PRESCRIBED. You will not be able to drive for 24 hours after the procedure due to sedation. Bring a  with you the day of the procedure. No aspirin, ibuprofen, naproxen, fish oil or vitamin E for 5 days before procedure. If you are on blood thinners, clearance from the prescribing physician will be obtained before your procedure is scheduled. Increased risks may be associated with discontinuation of your blood thinner and include, but not limited to, stroke, TIA, or cardiac event. If polyps are removed during the procedure they will be sent to a pathologist for analysis. You will be notified by mail of the pathology results in 2-3 weeks. Your physician may also schedule a follow up appointment with the nurse practitioner to discuss pathology, symptoms or to check if you have had any problems related to your procedure. If you prefer not to return to the office after your procedure please discuss this with your physician on the day of your colonoscopy. Final recommendations are based on the pathologist report.      Your diet before a colonoscopy bowel preparation is very important to ensure a successful colon exam. It is recommended to consider certain changes to your diet three to four days prior to the procedure. Remember that your bowels need to be empty for the exam.    What foods are good to eat? Cut down on heavy solid foods three to four days before the procedure and start introducing lighter meals to your diet. The following food suggestions are a good part of your diet before a colonoscopy bowel preparation. Light meat that is easily digestible such as chicken (without the skin)   Potatoes without skin   Cheese   Eggs   A light meal of steamed white fish   Light clear soups    Foods and drinks to avoid  Avoid foods that contain too much fiber. Stay clear of dark colored beverages. They can stick to the walls of the digestive tract and make it difficult to differentiate from blood. Some of these foods are:  Red meat, rice, nuts and vegetables   Milk, other milk based fluids and cream   Most fruit and puddings   Whole grain pasta   Cereals, bran and seeds   Colored beverages, especially those that are red or purple in color   Red colored Jell-O   On the day before the colonoscopy, continue to drink plenty of clear fluids. It is important   to keep yourself hydrated before the exam.     Please follow all instructions as provided for cleansing the bowel. Failure to have an adequately prepped colon may cause you to have incomplete exam with further testing required.

## 2018-02-21 NOTE — PROGRESS NOTES
US-guided cannulation left distal upper arm cephalic vein with 4-Qatari glide sheath; LUE AV f'grams with venography SVC; left cephalic vein BAM with [id number] julieth balloon; completion venogram E    VASCULAR SURGERY Left 5/28/15 [facility]    Percutaneous cannulation left distal radial artery with 4-Qatari glide sheath; LUE AV f'grams with venography SVC; left cephalic vein balloon a'plasty with [id number] julieth balloon and [id number] julieth balloon; proximal and mid upper arm cephalic vein BAM with [id number] julieth balloon; completion venogram E    VASCULAR SURGERY Left 6/15/15 [facility]    US-guided cannulation left cephalic vein with 4-Qatari and later 7-Qatari sheath; LUE AV f'grams including venography SVC; left cephalic vein stenosis balloon a'plasty with [id number] cutting balloon; completion f'gram and venogram [facility]    VASCULAR SURGERY  05/15/2017    [facility]. Left upper fistulograms/venograms.      Social History     Social History    Marital status:      Spouse name: N/A    Number of children: N/A    Years of education: N/A     Social History Main Topics    Smoking status: Current Every Day Smoker     Packs/day: 0.50     Years: 40.00    Smokeless tobacco: Never Used    Alcohol use No    Drug use: Yes     Types: Marijuana      Comment: Once in a while for vision    Sexual activity: Not Asked     Other Topics Concern    None     Social History Narrative    None     Allergies   Allergen Reactions    Sulfa Antibiotics      Causes hypoglycemia     Wellbutrin [Bupropion Hcl] Nausea Only    Azithromycin Rash    Penicillins Rash     Current Outpatient Prescriptions   Medication Sig Dispense Refill    ranitidine (ZANTAC) 150 MG tablet Take 150 mg by mouth daily      Travoprost, ALLISON Free, (TRAVATAN Z) 0.004 % SOLN ophthalmic solution 1 drop nightly      insulin glargine (LANTUS) 100 UNIT/ML injection vial Inject 10 Units into the skin      midodrine (PROAMATINE) 5 MG tablet TK 1 T PO HALF WAY THROUGH TREATMENT AS DIRECTED      sevelamer (RENVELA) 800 MG tablet Take 3 tablets by mouth 3 times daily (with meals)       Insulin Pen Needle (B-D ULTRAFINE III SHORT PEN) 31G X 8 MM MISC USE TO INJECT INSULIN ONCE A  each 0    pravastatin (PRAVACHOL) 10 MG tablet TAKE 1 TABLET BY MOUTH EVERY NIGHT AT BEDTIME 30 tablet 11    aspirin EC 81 MG EC tablet Take 81 mg by mouth daily      FREESTYLE LITE strip USE TO TEST BLOOD SUGAR TWICE DAILY 100 strip 0    Cetirizine HCl (ZYRTEC ALLERGY PO) Take 10 mg by mouth daily       fluticasone (FLONASE) 50 MCG/ACT nasal spray 2 sprays by Nasal route daily. To keep ears opened up 1 Bottle 11    Blood Glucose Monitoring Suppl NIKOS by Does not apply route. Pt will also need new lancet device if not included in kit. 1 Device 0    Glucose Blood (BLOOD GLUCOSE TEST STRIPS) STRP Check blood sugar twice a day for recent medication adjustments. 100 strip 11    bimatoprost 0.01 % SOLN Apply 1 drop to eye nightly. One drop in left eye at hs      Insulin Syringe-Needle U-100 (ACCUSURE INS SYR 1CC/31GX5/16\") 31G X 5/16\" 1 ML MISC by Does not apply route. 100 each 3    timolol (TIMOPTIC-XR) 0.5 % ophthalmic gel-forming Place 1 drop into the left eye 2 times daily       dorzolamide (TRUSOPT) 2 % ophthalmic solution Place 1 drop into the left eye 2 times daily       brimonidine (ALPHAGAN P) 0.15 % ophthalmic solution Place 1 drop into the left eye 3 times daily       homatropine 5 % ophthalmic solution Place 1 drop into the left eye daily        No current facility-administered medications for this visit. Review of Systems   Constitutional: Negative for appetite change, fatigue, fever and unexpected weight change. HENT: Negative for sore throat, trouble swallowing and voice change. Respiratory: Negative for cough and shortness of breath. Cardiovascular: Negative for chest pain, palpitations and leg swelling.    Gastrointestinal: Negative for abdominal distention,

## 2018-02-27 ENCOUNTER — TELEPHONE (OUTPATIENT)
Dept: GASTROENTEROLOGY | Age: 58
End: 2018-02-27

## 2018-03-01 ENCOUNTER — OFFICE VISIT (OUTPATIENT)
Dept: PRIMARY CARE CLINIC | Age: 58
End: 2018-03-01
Payer: MEDICARE

## 2018-03-01 VITALS
TEMPERATURE: 98.3 F | OXYGEN SATURATION: 98 % | HEART RATE: 89 BPM | WEIGHT: 141 LBS | DIASTOLIC BLOOD PRESSURE: 78 MMHG | BODY MASS INDEX: 28.43 KG/M2 | SYSTOLIC BLOOD PRESSURE: 136 MMHG | HEIGHT: 59 IN

## 2018-03-01 DIAGNOSIS — Z87.891 PERSONAL HISTORY OF TOBACCO USE, PRESENTING HAZARDS TO HEALTH: ICD-10-CM

## 2018-03-01 DIAGNOSIS — Z71.6 TOBACCO ABUSE COUNSELING: ICD-10-CM

## 2018-03-01 DIAGNOSIS — Z23 NEED FOR PROPHYLACTIC VACCINATION AGAINST DIPHTHERIA-TETANUS-PERTUSSIS (DTP): ICD-10-CM

## 2018-03-01 DIAGNOSIS — Z72.0 TOBACCO ABUSE: ICD-10-CM

## 2018-03-01 DIAGNOSIS — Z72.89 OTHER PROBLEMS RELATED TO LIFESTYLE: ICD-10-CM

## 2018-03-01 DIAGNOSIS — Z23 NEED FOR PROPHYLACTIC VACCINATION AND INOCULATION AGAINST VARICELLA: ICD-10-CM

## 2018-03-01 DIAGNOSIS — I10 ESSENTIAL HYPERTENSION: ICD-10-CM

## 2018-03-01 DIAGNOSIS — Z00.00 ROUTINE GENERAL MEDICAL EXAMINATION AT A HEALTH CARE FACILITY: Primary | ICD-10-CM

## 2018-03-01 DIAGNOSIS — Z12.31 ENCOUNTER FOR SCREENING MAMMOGRAM FOR MALIGNANT NEOPLASM OF BREAST: ICD-10-CM

## 2018-03-01 PROCEDURE — G0438 PPPS, INITIAL VISIT: HCPCS | Performed by: NURSE PRACTITIONER

## 2018-03-01 PROCEDURE — 99406 BEHAV CHNG SMOKING 3-10 MIN: CPT | Performed by: NURSE PRACTITIONER

## 2018-03-01 PROCEDURE — G0472 HEP C SCREEN HIGH RISK/OTHER: HCPCS | Performed by: NURSE PRACTITIONER

## 2018-03-01 ASSESSMENT — LIFESTYLE VARIABLES: HOW OFTEN DO YOU HAVE A DRINK CONTAINING ALCOHOL: 0

## 2018-03-01 ASSESSMENT — PATIENT HEALTH QUESTIONNAIRE - PHQ9: SUM OF ALL RESPONSES TO PHQ QUESTIONS 1-9: 0

## 2018-03-01 ASSESSMENT — ANXIETY QUESTIONNAIRES: GAD7 TOTAL SCORE: 0

## 2018-03-01 NOTE — PROGRESS NOTES
Medicare Annual Wellness Visit  Name: Halie Cantu Date: 3/1/2018   MRN: [id number] Sex: Female   Age: 59 y.o. Ethnicity: Non-/Non    : 1960 Race: Jocelyn Arboleda is here for Medicare AWV    Screenings for behavioral, psychosocial and functional/safety risks, and cognitive dysfunction are all negative except as indicated below. These results, as well as other patient data from the [address] form, are documented in Flowsheets linked to this Encounter. Allergies   Allergen Reactions    Sulfa Antibiotics      Causes hypoglycemia     Wellbutrin [Bupropion Hcl] Nausea Only    Azithromycin Rash    Penicillins Rash       Prior to Visit Medications    Medication Sig Taking?  Authorizing Provider   Tdap (ADACEL) 5-2-15.5 LF-MCG/0.5 injection Inject 0.5 mLs into the muscle once for 1 dose Yes Rebbecca Jean APRN   zoster recombinant adjuvanted vaccine (SHINGRIX) 50 MCG SUSR injection Inject 0.5 mLs into the muscle once for 1 dose Yes Reblandry Pena APRN   ranitidine (ZANTAC) 150 MG tablet Take 150 mg by mouth daily  Historical Provider, MD   Travoprost, ALLISON Free, (TRAVATAN Z) 0.004 % SOLN ophthalmic solution 1 drop nightly  Historical Provider, MD   insulin glargine (LANTUS) 100 UNIT/ML injection vial Inject 10 Units into the skin  Historical Provider, MD   midodrine (PROAMATINE) 5 MG tablet TK 1 T PO HALF WAY THROUGH TREATMENT AS DIRECTED  Historical Provider, MD   sevelamer (RENVELA) 800 MG tablet Take 3 tablets by mouth 3 times daily (with meals)   Historical Provider, MD   Insulin Pen Needle (B-D ULTRAFINE III SHORT PEN) 31G X 8 MM MISC USE TO INJECT INSULIN ONCE A DAY  Amalia Francis MD   pravastatin (PRAVACHOL) 10 MG tablet TAKE 1 TABLET BY MOUTH EVERY NIGHT AT BEDTIME  Amalia Francis MD   aspirin EC 81 MG EC tablet Take 81 mg by mouth daily  Historical Provider, MD   FREESTYLE LITE strip USE TO TEST BLOOD SUGAR [address] [facility], regularly involved in providing care):   Patient Care Team:  Roberto Cruz MD as PCP - General (Family Medicine)  Roberto Cruz MD as PCP - S Attributed Provider  Prateek Garza MD (Nephrology)  Isadora Yee MD (Optometry)  Nikky Gardner PA-C (Physician Assistant)    Wt Readings from Last 3 Encounters:   03/01/18 141 lb (64 kg)   02/21/18 140 lb 6.4 oz (63.7 kg)   02/14/18 142 lb (64.4 kg)     Vitals:    03/01/18 1322   BP: 136/78   Pulse: 89   Temp: 98.3 °F (36.8 °C)   SpO2: 98%   Weight: 141 lb (64 kg)   Height: 4' 11\" (1.499 m)       General Appearance: alert and oriented to person, place and time, well developed and well- nourished, in no acute distress  Skin: warm and dry, no rash or erythema  Head: normocephalic and atraumatic  Eyes: pupils equal, round, and reactive to light, extraocular eye movements intact, conjunctivae normal  ENT: tympanic membrane, external ear and ear canal normal bilaterally, nose without deformity, nasal mucosa and turbinates normal without polyps  Neck: supple and non-tender without mass, no thyromegaly or thyroid nodules, no cervical lymphadenopathy  Pulmonary/Chest: clear to auscultation bilaterally- no wheezes, rales or rhonchi, normal air movement, no respiratory distress  Cardiovascular: normal rate, regular rhythm, normal S1 and S2, no murmurs, rubs, clicks, or gallops, distal pulses intact, no carotid bruits  Abdomen: soft, non-tender, non-distended, normal bowel sounds, no masses or organomegaly  Extremities: no cyanosis, clubbing or edema  Musculoskeletal: normal range of motion, no joint swelling, deformity or tenderness  Neurologic: reflexes normal and symmetric, no cranial nerve deficit, gait, coordination and speech normal    Patient's complete Health Risk Assessment and screening values have been reviewed and are found in Flowsheets.  The following problems were reviewed today and where indicated follow up appointments were made and/or referrals Date(s) Administered    Influenza Vaccine, unspecified formulation 10/03/2017    Influenza Virus Vaccine 09/20/2012, 12/31/2014, 12/08/2015    Influenza, Timothy Connelly, 3 yrs and older, IM, Preservative Free 12/12/2016    Pneumococcal 13-valent Conjugate ([id number]) 12/12/2016    Pneumococcal Polysaccharide ([id number]) 11/21/2006, 12/08/2015        Health Maintenance   Topic Date Due    Hepatitis C screen  1960    HIV screen  03/20/1973    DTaP/Tdap/Td vaccine (1 - Tdap) 03/20/1977    Shingles Vaccine (1 of 2 - 2 Dose Series) 03/20/2008    Cervical cancer screen  02/25/2016    Colon cancer screen colonoscopy  06/05/2016    Diabetic retinal exam  05/30/2017    Lipid screen  11/28/2017    Potassium monitoring  11/28/2017    Creatinine monitoring  11/28/2017    A1C test (Diabetic or Prediabetic)  07/05/2018    Breast cancer screen  01/11/2019    Diabetic foot exam  01/30/2019    Flu vaccine  Completed    Pneumococcal highest risk  Completed     Recommendations for Preventive Services Due: see orders. Recommended screening schedule for the next 5-10 years is provided to the patient in written form: see Patient Instructions/AVS.    Tobacco Cessation Counseling: Patient advised about behavior change, including information about personal health harms, usage of appropriate cessation measures and benefits of cessation.   Time spent (minutes): 15      Hepatitis C Screening: Patient's exposure considered high risk due to none

## 2018-03-01 NOTE — PROGRESS NOTES
Does not apply route. Pt will also need new lancet device if not included in kit. Danette Baca MD   Glucose Blood (BLOOD GLUCOSE TEST STRIPS) STRP Check blood sugar twice a day for recent medication adjustments. Danette Baca MD   bimatoprost 0.01 % SOLN Apply 1 drop to eye nightly. One drop in left eye at hs  Historical Provider, MD   Insulin Syringe-Needle U-100 (ACCUSURE INS SYR 1CC/31GX5/16\") 31G X 5/16\" 1 ML MISC by Does not apply route.   Danette Baca MD   timolol (TIMOPTIC-XR) 0.5 % ophthalmic gel-forming Place 1 drop into the left eye 2 times daily   Historical Provider, MD   dorzolamide (TRUSOPT) 2 % ophthalmic solution Place 1 drop into the left eye 2 times daily   Historical Provider, MD   brimonidine (ALPHAGAN P) 0.15 % ophthalmic solution Place 1 drop into the left eye 3 times daily   Historical Provider, MD   homatropine 5 % ophthalmic solution Place 1 drop into the left eye daily   Historical Provider, MD       Past Medical History:   Diagnosis Date    Blind right eye     partial vision loss in the L eye too, due to DM    Blindness of one eye     Right     CKD (chronic kidney disease), stage IV ([address]     secondary to diabetic nephropathy    Diabetes ([address]     Diabetes mellitus with ophthalmic manifestations     dx'd in 2000 but likely ongoing before that, was dx'd when she went blind in the R eye    Glaucoma     Hemod[facility]     dialysis tues, thurs, sat. [facility]    Hypertension     2000, dx'd at same time as the DM    Obesity     Renal osteodystrophy     Smoker     Type II or unspecified type diabetes mellitus with renal manifestations, uncontrolled(250.42)      Past Surgical History:   Procedure Laterality Date    CARPAL TUNNEL RELEASE      CATARACT REMOVAL     [address] [place], and [address] Left 4/23/15 SJS    Left proximal ulnar artery to cephalic vein AVF creation    EYE

## 2018-03-01 NOTE — PATIENT INSTRUCTIONS
Personalized Preventive Plan for Olya Steel - 3/1/2018  Medicare offers a range of preventive health benefits. Some of the tests and screenings are paid in full while other may be subject to a deductible, co-insurance, and/or copay. Some of these benefits include a comprehensive review of your medical history including lifestyle, illnesses that may run in your family, and various assessments and screenings as appropriate. After reviewing your medical record and screening and assessments performed today your provider may have ordered immunizations, labs, imaging, and/or referrals for you. A list of these orders (if applicable) as well as your Preventive Care list are included within your After Visit Summary for your review. Other Preventive Recommendations:    · A preventive eye exam performed by an eye specialist is recommended every 1-2 years to screen for glaucoma; cataracts, macular degeneration, and other eye disorders. · A preventive dental visit is recommended every 6 months. · Try to get at least 150 minutes of exercise per week or 10,000 steps per day on a pedometer . · Order or download the FREE \"Exercise & Physical Activity: Your Everyday Guide\" from [employer]. Call 5-735.847.8935 or search [employer] online. · You need 5392-5878 mg of calcium and 1900-2150 IU of vitamin D per day. It is possible to meet your calcium requirement with diet alone, but a vitamin D supplement is usually necessary to meet this goal.  · When exposed to the sun, use a sunscreen that protects against both UVA and UVB radiation with an SPF of 30 or greater. Reapply every 2 to 3 hours or after sweating, drying off with a towel, or swimming. · Always wear a seat belt when traveling in a car. Always wear a helmet when riding a bicycle or motorcycle. Stopping Smoking: Care Instructions  Your Care Instructions    Cigarette smokers crave the nicotine in cigarettes. Giving it up is much harder than simply changing a habit. Your body has to stop craving the nicotine. It is hard to quit, but you can do it. There are many tools that people use to quit smoking. You may find that combining tools works best for you. There are several steps to quitting. First you get ready to quit. Then you get support to help you. After that, you learn new skills and behaviors to become a nonsmoker. For many people, a necessary step is getting and using medicine. Your doctor will help you set up the plan that best meets your needs. You may want to attend a smoking cessation program to help you quit smoking. When you choose a program, look for one that has proven success. Ask your doctor for ideas. You will greatly increase your chances of success if you take medicine as well as get counseling or join a cessation program.  Some of the changes you feel when you first quit tobacco are uncomfortable. Your body will miss the nicotine at first, and you may feel short-tempered and grumpy. You may have trouble sleeping or concentrating. Medicine can help you deal with these symptoms. You may struggle with changing your smoking habits and rituals. The last step is the tricky one: Be prepared for the smoking urge to continue for a time. This is a lot to deal with, but keep at it. You will feel better. Follow-up care is a key part of your treatment and safety. Be sure to make and go to all appointments, and call your doctor if you are having problems. It's also a good idea to know your test results and keep a list of the medicines you take. How can you care for yourself at home? · Ask your family, friends, and coworkers for support. You have a better chance of quitting if you have help and support. · Join a support group, such as Nicotine Anonymous, for people who are trying to quit smoking.   · Consider signing up for a smoking cessation program, such as the American [employer]'s Freedom from other may be subject to a deductible, co-insurance, and/or copay. Some of these benefits include a comprehensive review of your medical history including lifestyle, illnesses that may run in your family, and various assessments and screenings as appropriate. After reviewing your medical record and screening and assessments performed today your provider may have ordered immunizations, labs, imaging, and/or referrals for you. A list of these orders (if applicable) as well as your Preventive Care list are included within your After Visit Summary for your review. Other Preventive Recommendations:    · A preventive eye exam performed by an eye specialist is recommended every 1-2 years to screen for glaucoma; cataracts, macular degeneration, and other eye disorders. · A preventive dental visit is recommended every 6 months. · Try to get at least 150 minutes of exercise per week or 10,000 steps per day on a pedometer . · Order or download the FREE \"Exercise & Physical Activity: Your Everyday Guide\" from [employer]. Call 9-765.423.4263 or search [employer] online. · You need 3518-9661 mg of calcium and 9851-3644 IU of vitamin D per day. It is possible to meet your calcium requirement with diet alone, but a vitamin D supplement is usually necessary to meet this goal.  · When exposed to the sun, use a sunscreen that protects against both UVA and UVB radiation with an SPF of 30 or greater. Reapply every 2 to 3 hours or after sweating, drying off with a towel, or swimming. · Always wear a seat belt when traveling in a car. Always wear a helmet when riding a bicycle or motorcycle.

## 2018-03-09 ENCOUNTER — ANESTHESIA (OUTPATIENT)
Dept: ENDOSCOPY | Age: 58
End: 2018-03-09
Payer: MEDICARE

## 2018-03-09 ENCOUNTER — HOSPITAL ENCOUNTER (OUTPATIENT)
Age: 58
Setting detail: OUTPATIENT SURGERY
Discharge: HOME | End: 2018-03-09
Attending: INTERNAL MEDICINE | Admitting: INTERNAL MEDICINE
Payer: MEDICARE

## 2018-03-09 ENCOUNTER — ANESTHESIA EVENT (OUTPATIENT)
Dept: ENDOSCOPY | Age: 58
End: 2018-03-09
Payer: MEDICARE

## 2018-03-09 VITALS
RESPIRATION RATE: 16 BRPM | HEART RATE: 84 BPM | SYSTOLIC BLOOD PRESSURE: 122 MMHG | TEMPERATURE: 97.5 F | DIASTOLIC BLOOD PRESSURE: 66 MMHG | WEIGHT: 140 LBS | HEIGHT: 59 IN | BODY MASS INDEX: 28.22 KG/M2 | OXYGEN SATURATION: 100 %

## 2018-03-09 VITALS
DIASTOLIC BLOOD PRESSURE: 52 MMHG | OXYGEN SATURATION: 98 % | SYSTOLIC BLOOD PRESSURE: 123 MMHG | RESPIRATION RATE: 16 BRPM

## 2018-03-09 LAB
ANION GAP SERPL CALCULATED.3IONS-SCNC: 16 MMOL/L (ref 7–19)
BUN BLDV-MCNC: 47 MG/DL (ref 6–20)
CALCIUM SERPL-MCNC: 10.1 MG/DL (ref 8.6–10)
CHLORIDE BLD-SCNC: 97 MMOL/L (ref 98–111)
CO2: 26 MMOL/L (ref 22–29)
CREAT SERPL-MCNC: 5.3 MG/DL (ref 0.5–0.9)
GFR NON-AFRICAN AMERICAN: 8
GLUCOSE BLD-MCNC: 141 MG/DL (ref 70–99)
GLUCOSE BLD-MCNC: 153 MG/DL (ref 74–109)
PERFORMED ON: ABNORMAL
POTASSIUM SERPL-SCNC: 4.9 MMOL/L (ref 3.5–5)
SODIUM BLD-SCNC: 139 MMOL/L (ref 136–145)

## 2018-03-09 PROCEDURE — 6360000002 HC RX W HCPCS: Performed by: NURSE ANESTHETIST, CERTIFIED REGISTERED

## 2018-03-09 PROCEDURE — 7100000010 HC PHASE II RECOVERY - FIRST 15 MIN: Performed by: INTERNAL MEDICINE

## 2018-03-09 PROCEDURE — 82948 REAGENT STRIP/BLOOD GLUCOSE: CPT

## 2018-03-09 PROCEDURE — 2580000003 HC RX 258: Performed by: INTERNAL MEDICINE

## 2018-03-09 PROCEDURE — 36415 COLL VENOUS BLD VENIPUNCTURE: CPT

## 2018-03-09 PROCEDURE — 7100000011 HC PHASE II RECOVERY - ADDTL 15 MIN: Performed by: INTERNAL MEDICINE

## 2018-03-09 PROCEDURE — 3700000000 HC ANESTHESIA ATTENDED CARE: Performed by: INTERNAL MEDICINE

## 2018-03-09 PROCEDURE — 3700000001 HC ADD 15 MINUTES (ANESTHESIA): Performed by: INTERNAL MEDICINE

## 2018-03-09 PROCEDURE — 3609010600 HC COLONOSCOPY POLYPECTOMY SNARE/COLD BIOPSY: Performed by: INTERNAL MEDICINE

## 2018-03-09 PROCEDURE — 80048 BASIC METABOLIC PNL TOTAL CA: CPT

## 2018-03-09 PROCEDURE — 2500000003 HC RX 250 WO HCPCS: Performed by: INTERNAL MEDICINE

## 2018-03-09 PROCEDURE — C1773 RET DEV, INSERTABLE: HCPCS | Performed by: INTERNAL MEDICINE

## 2018-03-09 PROCEDURE — 45385 COLONOSCOPY W/LESION REMOVAL: CPT | Performed by: INTERNAL MEDICINE

## 2018-03-09 PROCEDURE — 88305 TISSUE EXAM BY PATHOLOGIST: CPT

## 2018-03-09 PROCEDURE — 45380 COLONOSCOPY AND BIOPSY: CPT | Performed by: INTERNAL MEDICINE

## 2018-03-09 RX ORDER — PROPOFOL 10 MG/ML
INJECTION, EMULSION INTRAVENOUS PRN
Status: DISCONTINUED | OUTPATIENT
Start: 2018-03-09 | End: 2018-03-09 | Stop reason: SDUPTHER

## 2018-03-09 RX ORDER — SODIUM CHLORIDE 450 MG/100ML
INJECTION, SOLUTION INTRAVENOUS ONCE
Status: COMPLETED | OUTPATIENT
Start: 2018-03-09 | End: 2018-03-09

## 2018-03-09 RX ORDER — SODIUM CHLORIDE, SODIUM LACTATE, POTASSIUM CHLORIDE, CALCIUM CHLORIDE 600; 310; 30; 20 MG/100ML; MG/100ML; MG/100ML; MG/100ML
INJECTION, SOLUTION INTRAVENOUS CONTINUOUS
Status: DISCONTINUED | OUTPATIENT
Start: 2018-03-09 | End: 2018-03-09

## 2018-03-09 RX ORDER — LIDOCAINE HYDROCHLORIDE 10 MG/ML
1 INJECTION, SOLUTION INFILTRATION; PERINEURAL ONCE
Status: COMPLETED | OUTPATIENT
Start: 2018-03-09 | End: 2018-03-09

## 2018-03-09 RX ORDER — LIDOCAINE HYDROCHLORIDE 10 MG/ML
1 INJECTION, SOLUTION EPIDURAL; INFILTRATION; INTRACAUDAL; PERINEURAL ONCE
Status: DISCONTINUED | OUTPATIENT
Start: 2018-03-09 | End: 2018-03-09 | Stop reason: HOSPADM

## 2018-03-09 RX ADMIN — LIDOCAINE HYDROCHLORIDE 5 ML: 10 INJECTION, SOLUTION INFILTRATION; PERINEURAL at 12:16

## 2018-03-09 RX ADMIN — SODIUM CHLORIDE: 4.5 INJECTION, SOLUTION INTRAVENOUS at 11:50

## 2018-03-09 RX ADMIN — PHENYLEPHRINE HYDROCHLORIDE 200 MCG: 10 INJECTION INTRAVENOUS at 12:45

## 2018-03-09 RX ADMIN — PROPOFOL 380 MG: 10 INJECTION, EMULSION INTRAVENOUS at 12:16

## 2018-03-09 RX ADMIN — PHENYLEPHRINE HYDROCHLORIDE 200 MCG: 10 INJECTION INTRAVENOUS at 12:25

## 2018-03-09 ASSESSMENT — LIFESTYLE VARIABLES: SMOKING_STATUS: 1

## 2018-03-09 ASSESSMENT — PAIN - FUNCTIONAL ASSESSMENT: PAIN_FUNCTIONAL_ASSESSMENT: 0-10

## 2018-03-09 NOTE — OP NOTE
Post Procedure Note    Name of surgeon / : Elfego Reeves DO    Date of Service: 03/09/18    Withdrawal Time: >6min    Prep Quality: good     Pre-operative Diagnosis:   Active Hospital Problems    Diagnosis Date Noted    Screening for colon cancer [Z12.11] 02/21/2018       Post-operative Diagnosis/Findings: colon polyps, diverticulosis    Procedure: Procedure(s):  COLONOSCOPY with hot snare polypectomy and cold biopsy polypectomy    Anesthesia: Monitor Anesthesia Care    Surgeons/Assistants: Elfego Reeves DO    Referring Physician: Sebas Hawkins DO    Procedure Note:  After informed consent was obtained, the patient was placed in the left lateral decubitus position and sedated per MAC. A rectal exam was done which was normal.  The colonoscope was inserted into the rectum and retroflexed which was normal.  The colonoscope was then advanced to the cecum under direct visualization. The appendiceal orifice and ileocecal valve were identified. The colonoscope was withdrawn. She had numerous colon polyp in the ascending colon, transverse colon, descending colon, and sigmoid colon. They were removed via hot snare polypectomy and cold biopsy polypectomy. One polyp in the descending colon appeared like a hyperplastic polyp on a fold. Only a biopsy was taken. She had mild left sided diverticulosis. One polyp in the ascending colon was not retrieved. No other abnormalities were discovered. The colonoscope was completely withdrawn. The patient tolerated the procedure.      Estimated Blood Loss: Minimal    Complications: None    Specimens:   ID Type Source Tests Collected by Time Destination   A : ascending colon polyp Tissue Colon SURGICAL PATHOLOGY Luis Antonio Reeves DO 3/9/2018 1234    B : Ascending colon polyp/not retrieved Tissue Colon SURGICAL PATHOLOGY Luis Antonio Reeves,  3/9/2018 1235    C : Transverse colon polyp Tissue Colon SURGICAL PATHOLOGY Luis Antonio Reeves,  3/9/2018 1244    D : Transverse

## 2018-03-09 NOTE — H&P
Patient Name: Sudhir Welch  : 1960  MRN: [id number]    Allergies: Allergies   Allergen Reactions    Sulfa Antibiotics      Causes hypoglycemia     Wellbutrin [Bupropion Hcl] Nausea Only    Azithromycin Rash    Penicillins Rash         ENDOSCOPY / COLONOSCOPY / BRONCHOSCOPY      PRE-SEDATION ASSESSMENT      Procedure:    [x] Colonoscopy     [] Endoscopy      [] ERCP      [] Bronchoscopy      [] Other  [] History and Physical completed in chart for Inpatient or within 30 daysfrom office. I have examined the patient's status immediately prior to the procedure and:    [x] No interval change in patient status since H&P completed  [] Interval change in patient status (explained below)           BRIEF H&P    HPI/changes/indicators/diagnosis  Active Hospital Problems    Diagnosis Date Noted    Screening for colon cancer [Z12.11] 2018       Medications:   Prior to Admission medications    Medication Sig Start Date End Date Taking? Authorizing Provider   fluticasone (FLONASE) 50 MCG/ACT nasal spray 2 sprays by Nasal route daily.  To keep ears opened up 3/27/14  Yes Andreia Peralta MD   ranitidine (ZANTAC) 150 MG tablet Take 150 mg by mouth daily    Historical Provider, MD   Travoprost, ALLISON Free, (TRAVATAN Z) 0.004 % SOLN ophthalmic solution 1 drop nightly    Historical Provider, MD   insulin glargine (LANTUS) 100 UNIT/ML injection vial Inject 10 Units into the skin    Historical Provider, MD   midodrine (PROAMATINE) 5 MG tablet TK 1 T PO [address] [place] [address] TREATMENT AS DIRECTED 10/17/17   Historical Provider, MD   sevelamer (RENVELA) 800 MG tablet Take 3 tablets by mouth 3 times daily (with meals)     Historical Provider, MD   Insulin Pen Needle (B-D ULTRAFINE III SHORT PEN) 31G X 8 MM MISC USE TO INJECT INSULIN ONCE A DAY 18   Randa Goddard MD   pravastatin (PRAVACHOL) 10 MG tablet TAKE 1 TABLET BY MOUTH EVERY NIGHT AT BEDTIME 10/9/17   Randa Goddard MD   aspirin EC 81 MG EC tablet Take 81 mg by mouth daily    Historical Provider, MD   FREESTYLE LITE strip USE TO TEST BLOOD SUGAR TWICE DAILY 12/29/16   Josselyn Mullen MD   Cetirizine HCl (ZYRTEC ALLERGY PO) Take 10 mg by mouth daily     Historical Provider, MD   Blood Glucose Monitoring Suppl NIKOS by Does not apply route. Pt will also need new lancet device if not included in kit. 12/9/13   Mane Kay MD   Glucose Blood (BLOOD GLUCOSE TEST STRIPS) STRP Check blood sugar twice a day for recent medication adjustments. 12/9/13   Mane Kay MD   bimatoprost 0.01 % SOLN Apply 1 drop to eye nightly. One drop in left eye at hs    Historical Provider, MD   Insulin Syringe-Needle U-100 (ACCUSURE INS SYR 1CC/31GX5/16\") 31G X 5/16\" 1 ML MISC by Does not apply route. 9/25/12   Mane Kay MD   timolol (TIMOPTIC-XR) 0.5 % ophthalmic gel-forming Place 1 drop into the left eye 2 times daily  7/3/12   Historical Provider, MD   dorzolamide (TRUSOPT) 2 % ophthalmic solution Place 1 drop into the left eye 2 times daily     Historical Provider, MD   brimonidine (ALPHAGAN P) 0.15 % ophthalmic solution Place 1 drop into the left eye 3 times daily     Historical Provider, MD   homatropine 5 % ophthalmic solution Place 1 drop into the left eye daily     Historical Provider, MD       Allergies:   is allergic to sulfa antibiotics; wellbutrin [bupropion hcl]; azithromycin; and penicillins. Vital Signs:   Vitals:    03/09/18 1142   BP: (!) 165/84   Pulse: 90   Resp: 18   Temp:    SpO2: 97%       ROS:  Cardiac:  [x]WNL  []Comments:  Pulmonary:  [x]WNL   []Comments:  Neuro/Mental Status:  [x]WNL  []Comments:  Abdominal:                   Active Hospital Problems    Diagnosis Date Noted    Screening for colon cancer [Z12.11] 02/21/2018        All other pertinent GI symptoms negative.     Physical Exam:  Cardiac:  [x]WNL  []Comments:  Pulmonary:  [x]WNL   []Comments:  Neuro/Mental Status:  [x]WNL  []Comments:  Abdominal:  [x]WNL

## 2018-03-09 NOTE — ANESTHESIA PRE PROCEDURE
Department of Anesthesiology  Preprocedure Note       Name:  Rodney Chaves   Age:  59 y.o.  :  1960                                          MRN:  [id number]         Date:  3/9/2018      Surgeon: Susan Maciel):  Joslyn Reeves DO    Procedure: Procedure(s):  COLONOSCOPY DIAGNOSTIC OR SCREENING    Medications prior to admission:   Prior to Admission medications    Medication Sig Start Date End Date Taking? Authorizing Provider   ranitidine (ZANTAC) 150 MG tablet Take 150 mg by mouth daily    Historical Provider, MD   Travoprost, ALLISON Free, (TRAVATAN Z) 0.004 % SOLN ophthalmic solution 1 drop nightly    Historical Provider, MD   insulin glargine (LANTUS) 100 UNIT/ML injection vial Inject 10 Units into the skin    Historical Provider, MD   midodrine (PROAMATINE) 5 MG tablet TK 1 T PO HALF WAY THROUGH TREATMENT AS DIRECTED 10/17/17   Historical Provider, MD   sevelamer (RENVELA) 800 MG tablet Take 3 tablets by mouth 3 times daily (with meals)     Historical Provider, MD   Insulin Pen Needle (B-D ULTRAFINE III SHORT PEN) 31G X 8 MM MISC USE TO INJECT INSULIN ONCE A DAY 18   Michael Garza MD   pravastatin (PRAVACHOL) 10 MG tablet TAKE 1 TABLET BY MOUTH EVERY NIGHT AT BEDTIME 10/9/17   Michael Garza MD   aspirin EC 81 MG EC tablet Take 81 mg by mouth daily    Historical Provider, MD   FREESTYLE LITE strip USE TO TEST BLOOD SUGAR TWICE DAILY 16   Michael Garza MD   Cetirizine HCl (ZYRTEC ALLERGY PO) Take 10 mg by mouth daily     Historical Provider, MD   fluticasone (FLONASE) 50 MCG/ACT nasal spray 2 sprays by Nasal route daily. To keep ears opened up 3/27/14   Shania Johnston MD   Blood Glucose Monitoring Suppl NIKOS by Does not apply route. Pt will also need new lancet device if not included in kit. 13   Shania Johnston MD   Glucose Blood (BLOOD GLUCOSE TEST STRIPS) STRP Check blood sugar twice a day for recent medication adjustments.  13   Shania Johnston MD   bimatoprost 0.01 % SOLN Apply 1 drop to eye nightly. One drop in left eye at hs    Historical Provider, MD   Insulin Syringe-Needle U-100 (ACCUSURE INS SYR 1CC/31GX5/16\") 31G X 5/16\" 1 ML MISC by Does not apply route. 9/25/12   Marck Osborn MD   timolol (TIMOPTIC-XR) 0.5 % ophthalmic gel-forming Place 1 drop into the left eye 2 times daily  7/3/12   Historical Provider, MD   dorzolamide (TRUSOPT) 2 % ophthalmic solution Place 1 drop into the left eye 2 times daily     Historical Provider, MD   brimonidine (ALPHAGAN P) 0.15 % ophthalmic solution Place 1 drop into the left eye 3 times daily     Historical Provider, MD   homatropine 5 % ophthalmic solution Place 1 drop into the left eye daily     Historical Provider, MD       Current medications:    Current Facility-Administered Medications   Medication Dose Route Frequency Provider Last Rate Last Dose    lactated ringers infusion   Intravenous Continuous Wolf Reeves DO        lidocaine 1 % injection 1 mL  1 mL Intradermal Once Wolf Reeves DO           Allergies:     Allergies   Allergen Reactions    Sulfa Antibiotics      Causes hypoglycemia     Wellbutrin [Bupropion Hcl] Nausea Only    Azithromycin Rash    Penicillins Rash       Problem List:    Patient Active Problem List   Diagnosis Code    Diabetes mellitus with ophthalmic complication (HCC) S83.20    CKD (chronic kidney disease), stage V ([address] N18.5    Arthropathy M12.9    Proteinuria R80.9    Disorder of phosphorus metabolism E83.30    HTN (hypertension) I10    Glaucoma H40.9    CKD (chronic kidney disease), stage IV (HCC) N18.4    Diabetic nephropathy (HCC) E11.21    Breast density R92.2    CKD (chronic kidney disease) stage V requiring chronic dialysis ([address] N18.6, Z99.2    Dialysis AV fistula malfunction ([address] T82.590A    Encounter for screening colonoscopy Z12.11    ESRD (end stage renal disease) on dialysis (HCC) N18.6, Z99.2       Past Medical History:        Diagnosis Date    Blind upper fistulograms/venograms.  VASCULAR SURGERY  02/14/2018    [facility]. Left upper fistulograms, left subclavian BA 12x40 atlas, left cephalic arch BA 66U70 conquest.       Social History:    Social History   Substance Use Topics    Smoking status: Current Every Day Smoker     Packs/day: 0.50     Years: 40.00    Smokeless tobacco: Never Used    Alcohol use No                                Ready to quit: Not Answered  Counseling given: Not Answered      Vital Signs (Current): There were no vitals filed for this visit. BP Readings from Last 3 Encounters:   03/01/18 136/78   02/21/18 112/60   02/14/18 (!) 150/81       NPO Status:                                                                                 BMI:   Wt Readings from Last 3 Encounters:   03/01/18 141 lb (64 kg)   02/21/18 140 lb 6.4 oz (63.7 kg)   02/14/18 142 lb (64.4 kg)     There is no height or weight on file to calculate BMI.    CBC:   Lab Results   Component Value Date    WBC 5.48 04/16/2015    RBC 3.97 04/16/2015    HGB 12.4 04/16/2015    HCT 36.7 04/16/2015    MCV 92.4 04/16/2015    RDW 13.7 04/16/2015     04/16/2015       CMP:   Lab Results   Component Value Date     11/28/2016    K 4.7 11/28/2016     11/28/2016    CO2 17 11/28/2016    BUN 70 11/28/2016    CREATININE 4.5 11/28/2016    LABGLOM 10 11/28/2016    GLUCOSE 150 11/28/2016    PROT 7.0 11/28/2016    PROT 6.7 11/13/2012    CALCIUM 11.9 11/28/2016    BILITOT <0.2 11/28/2016    ALKPHOS 53 11/28/2016    AST 15 11/28/2016    ALT 9 11/28/2016       POC Tests: No results for input(s): POCGLU, POCNA, POCK, POCCL, POCBUN, POCHEMO, POCHCT in the last 72 hours.     Coags:   Lab Results   Component Value Date    PROTIME 12.5 04/16/2015    INR 0.96 04/16/2015       HCG (If Applicable): No results found for: PREGTESTUR, PREGSERUM, HCG, HCGQUANT     ABGs: No results found for: PHART, PO2ART, BTI0GNM, IEI5WZP, BEART, O5GJBUSS     Type &

## 2018-03-19 DIAGNOSIS — I10 ESSENTIAL HYPERTENSION: ICD-10-CM

## 2018-03-19 DIAGNOSIS — E11.21 DIABETIC NEPHROPATHY ASSOCIATED WITH TYPE 2 DIABETES MELLITUS (HCC): ICD-10-CM

## 2018-03-19 DIAGNOSIS — Z72.89 OTHER PROBLEMS RELATED TO LIFESTYLE: ICD-10-CM

## 2018-03-19 DIAGNOSIS — E11.39 TYPE 2 DIABETES MELLITUS WITH OTHER OPHTHALMIC COMPLICATION, UNSPECIFIED LONG TERM INSULIN USE STATUS: ICD-10-CM

## 2018-03-19 LAB
ALBUMIN SERPL-MCNC: 4.2 G/DL (ref 3.5–5.2)
ALP BLD-CCNC: 85 U/L (ref 35–104)
ALT SERPL-CCNC: 7 U/L (ref 5–33)
ANION GAP SERPL CALCULATED.3IONS-SCNC: 20 MMOL/L (ref 7–19)
AST SERPL-CCNC: 15 U/L (ref 5–32)
BILIRUB SERPL-MCNC: 0.3 MG/DL (ref 0.2–1.2)
BUN BLDV-MCNC: 37 MG/DL (ref 6–20)
CALCIUM SERPL-MCNC: 9.7 MG/DL (ref 8.6–10)
CHLORIDE BLD-SCNC: 90 MMOL/L (ref 98–111)
CHOLESTEROL, TOTAL: 178 MG/DL (ref 160–199)
CO2: 30 MMOL/L (ref 22–29)
CREAT SERPL-MCNC: 5.2 MG/DL (ref 0.5–0.9)
GFR NON-AFRICAN AMERICAN: 8
GLUCOSE BLD-MCNC: 111 MG/DL (ref 74–109)
HBA1C MFR BLD: 6.9 %
HDLC SERPL-MCNC: 62 MG/DL (ref 65–121)
HEPATITIS C ANTIBODY INTERPRETATION: NORMAL
LDL CHOLESTEROL CALCULATED: 97 MG/DL
POTASSIUM SERPL-SCNC: 3.7 MMOL/L (ref 3.5–5)
RAPID HIV 1&2: NORMAL
SODIUM BLD-SCNC: 140 MMOL/L (ref 136–145)
TOTAL PROTEIN: 7.2 G/DL (ref 6.6–8.7)
TRIGL SERPL-MCNC: 96 MG/DL (ref 0–149)

## 2018-03-21 ENCOUNTER — TELEPHONE (OUTPATIENT)
Dept: PRIMARY CARE CLINIC | Age: 58
End: 2018-03-21

## 2018-03-21 NOTE — TELEPHONE ENCOUNTER
This [facility] called and gave the patients their lab results. Patient stated understanding.

## 2018-03-21 NOTE — TELEPHONE ENCOUNTER
----- Message from Nuria Ulrich MD sent at 3/19/2018  5:03 PM CDT -----  Please notify patient of normal results. Normal Hgb A1c at 6.9. Cholesterol at goal. The current medical regimen is effective;  continue present plan and medications.

## 2018-03-23 ENCOUNTER — HOSPITAL ENCOUNTER (OUTPATIENT)
Age: 58
Setting detail: SPECIMEN
Discharge: HOME | End: 2018-03-23
Payer: MEDICARE

## 2018-03-23 ENCOUNTER — HOSPITAL ENCOUNTER (OUTPATIENT)
Dept: WOMENS IMAGING | Age: 58
Discharge: HOME | End: 2018-03-23
Payer: MEDICARE

## 2018-03-23 ENCOUNTER — OFFICE VISIT (OUTPATIENT)
Dept: PRIMARY CARE CLINIC | Age: 58
End: 2018-03-23
Payer: MEDICARE

## 2018-03-23 VITALS
WEIGHT: 141 LBS | OXYGEN SATURATION: 96 % | TEMPERATURE: 97 F | BODY MASS INDEX: 28.43 KG/M2 | HEART RATE: 85 BPM | DIASTOLIC BLOOD PRESSURE: 60 MMHG | SYSTOLIC BLOOD PRESSURE: 110 MMHG | HEIGHT: 59 IN

## 2018-03-23 DIAGNOSIS — Z01.419 ENCOUNTER FOR CERVICAL PAP SMEAR WITH PELVIC EXAM: Primary | ICD-10-CM

## 2018-03-23 DIAGNOSIS — Z12.31 ENCOUNTER FOR SCREENING MAMMOGRAM FOR MALIGNANT NEOPLASM OF BREAST: ICD-10-CM

## 2018-03-23 DIAGNOSIS — Z71.89 BREAST SELF EXAMINATION EDUCATION, ENCOUNTER FOR: ICD-10-CM

## 2018-03-23 PROCEDURE — G8484 FLU IMMUNIZE NO ADMIN: HCPCS | Performed by: NURSE PRACTITIONER

## 2018-03-23 PROCEDURE — 99401 PREV MED CNSL INDIV APPRX 15: CPT | Performed by: NURSE PRACTITIONER

## 2018-03-23 PROCEDURE — G8417 CALC BMI ABV UP PARAM F/U: HCPCS | Performed by: NURSE PRACTITIONER

## 2018-03-23 PROCEDURE — 88142 CYTOPATH C/V THIN LAYER: CPT

## 2018-03-23 PROCEDURE — 4004F PT TOBACCO SCREEN RCVD TLK: CPT | Performed by: NURSE PRACTITIONER

## 2018-03-23 PROCEDURE — 77063 BREAST TOMOSYNTHESIS BI: CPT

## 2018-03-23 PROCEDURE — 3014F SCREEN MAMMO DOC REV: CPT | Performed by: NURSE PRACTITIONER

## 2018-03-23 PROCEDURE — 3017F COLORECTAL CA SCREEN DOC REV: CPT | Performed by: NURSE PRACTITIONER

## 2018-03-23 PROCEDURE — G0101 CA SCREEN;PELVIC/BREAST EXAM: HCPCS | Performed by: NURSE PRACTITIONER

## 2018-03-23 PROCEDURE — G8427 DOCREV CUR MEDS BY ELIG CLIN: HCPCS | Performed by: NURSE PRACTITIONER

## 2018-03-23 ASSESSMENT — ENCOUNTER SYMPTOMS
GASTROINTESTINAL NEGATIVE: 1
EYES NEGATIVE: 1
RESPIRATORY NEGATIVE: 1

## 2018-03-23 NOTE — PROGRESS NOTES
SUGAR TWICE DAILY 100 strip 0    Cetirizine HCl (ZYRTEC ALLERGY PO) Take 10 mg by mouth daily       fluticasone (FLONASE) 50 MCG/ACT nasal spray 2 sprays by Nasal route daily. To keep ears opened up 1 Bottle 11    Blood Glucose Monitoring Suppl NIKOS by Does not apply route. Pt will also need new lancet device if not included in kit. 1 Device 0    Glucose Blood (BLOOD GLUCOSE TEST STRIPS) STRP Check blood sugar twice a day for recent medication adjustments. 100 strip 11    bimatoprost 0.01 % SOLN Apply 1 drop to eye nightly. One drop in left eye at hs      Insulin Syringe-Needle U-100 (ACCUSURE INS SYR 1CC/31GX5/16\") 31G X 5/16\" 1 ML MISC by Does not apply route. 100 each 3    timolol (TIMOPTIC-XR) 0.5 % ophthalmic gel-forming Place 1 drop into the left eye 2 times daily       dorzolamide (TRUSOPT) 2 % ophthalmic solution Place 1 drop into the left eye 2 times daily       brimonidine (ALPHAGAN P) 0.15 % ophthalmic solution Place 1 drop into the left eye 3 times daily       homatropine 5 % ophthalmic solution Place 1 drop into the left eye daily        No current facility-administered medications for this visit. Allergies   Allergen Reactions    Sulfa Antibiotics      Causes hypoglycemia     Wellbutrin [Bupropion Hcl] Nausea Only    Azithromycin Rash    Penicillins Rash       Family History   Problem Relation Age of Onset    Heart Disease Mother     Heart Attack Mother     Glaucoma Sister     Diabetes Sister     Diabetes Brother     Kidney Disease Brother     Blindness Brother     Stroke Maternal Grandmother     Stroke Maternal Grandfather     Colon Cancer Neg Hx     Colon Polyps Neg Hx     Liver Cancer Neg Hx     Liver Disease Neg Hx     Esophageal Cancer Neg Hx     Rectal Cancer Neg Hx     Stomach Cancer Neg Hx          Subjective:      Review of Systems   Constitutional: Negative. HENT: Negative. Eyes: Negative. Respiratory: Negative. Cardiovascular: Negative. f'grams with venography SVC; left cephalic vein               balloon a'plasty with [id number] julieth balloon               and [id number] julieth balloon; proximal and mid               upper arm cephalic vein BAM with [id number]                julieth balloon; completion venogram LUE  6/15/15 [facility]: VASCULAR SURGERY Left      Comment: US-guided cannulation left cephalic vein with                4-Georgian and later 7-Georgian sheath; LUE AV                f'grams including venography SVC; left cephalic               vein stenosis balloon a'plasty with [id number]                cutting balloon; completion f'gram and venogram               LUE  05/15/2017: VASCULAR SURGERY      Comment: KAREY. Left upper fistulograms/venograms. 02/14/2018: VASCULAR SURGERY      Comment: KAREY. Left upper fistulograms, left subclavian                BA 12x40 atlas, left cephalic arch BA 09T17                conquest.      Smoking status: Current Every Day Smoker                                                   Packs/day: 0.50      Years: 40.00     Smokeless tobacco: Never Used                           Standing Status:   Future     Standing Expiration Date:   3/23/2019     Order Specific Question:   Collection Type     Answer: Thin Prep     Order Specific Question:   Prior Abnormal Pap Test     Answer:   No     Order Specific Question:   Screening or Diagnostic     Answer:   Screening     Order Specific Question:   HPV Requested? Answer:   Yes     Order Specific Question:   High Risk Patient     Answer:   N/A       No orders of the defined types were placed in this encounter. Patient given educational materials - see patient instructions. Discussed use, benefit, and side effects of prescribed medications. All patient questions answered. Pt voiced understanding. Reviewed health maintenance. Instructed to continue current medications, diet and exercise. Patient agreed with treatment plan. Follow up as directed.

## 2018-03-27 ENCOUNTER — TELEPHONE (OUTPATIENT)
Dept: PRIMARY CARE CLINIC | Age: 58
End: 2018-03-27

## 2018-03-27 NOTE — TELEPHONE ENCOUNTER
----- Message from RAMONA Arreola sent at 3/27/2018  2:53 PM CDT -----  Please let patient know that mammogram did show stable benign findings. Thanks!

## 2018-03-30 ENCOUNTER — TELEPHONE (OUTPATIENT)
Dept: PRIMARY CARE CLINIC | Age: 58
End: 2018-03-30

## 2018-03-30 DIAGNOSIS — Z01.419 ENCOUNTER FOR CERVICAL PAP SMEAR WITH PELVIC EXAM: Primary | ICD-10-CM

## 2018-04-04 LAB
HPV TYPE 16: NOT DETECTED
HPV TYPE 18: NOT DETECTED
INTERPRETATION: NORMAL
OTHER HIGH RISK HPV: NOT DETECTED
SOURCE: NORMAL

## 2018-04-11 PROBLEM — Z12.11 SCREENING FOR COLON CANCER: Status: RESOLVED | Noted: 2018-02-21 | Resolved: 2018-04-11

## 2018-06-23 ENCOUNTER — HOSPITAL ENCOUNTER (EMERGENCY)
Age: 58
Discharge: HOME | End: 2018-06-23
Attending: EMERGENCY MEDICINE
Payer: MEDICARE

## 2018-06-23 VITALS
WEIGHT: 135 LBS | HEIGHT: 59 IN | RESPIRATION RATE: 18 BRPM | DIASTOLIC BLOOD PRESSURE: 78 MMHG | BODY MASS INDEX: 27.21 KG/M2 | TEMPERATURE: 98.6 F | OXYGEN SATURATION: 96 % | SYSTOLIC BLOOD PRESSURE: 130 MMHG | HEART RATE: 84 BPM

## 2018-06-23 DIAGNOSIS — E87.5 HYPERKALEMIA: ICD-10-CM

## 2018-06-23 DIAGNOSIS — N18.6 END STAGE RENAL DISEASE (HCC): Primary | ICD-10-CM

## 2018-06-23 LAB
ALBUMIN SERPL-MCNC: 3.2 G/DL (ref 3.5–5.2)
ALP BLD-CCNC: 98 U/L (ref 35–104)
ALT SERPL-CCNC: 10 U/L (ref 5–33)
ANION GAP SERPL CALCULATED.3IONS-SCNC: 17 MMOL/L (ref 7–19)
AST SERPL-CCNC: 35 U/L (ref 5–32)
BILIRUB SERPL-MCNC: 0.3 MG/DL (ref 0.2–1.2)
BUN BLDV-MCNC: 39 MG/DL (ref 8–23)
CALCIUM SERPL-MCNC: 9.2 MG/DL (ref 8.8–10.2)
CHLORIDE BLD-SCNC: 85 MMOL/L (ref 98–111)
CO2: 27 MMOL/L (ref 22–29)
CREAT SERPL-MCNC: 5.1 MG/DL (ref 0.5–0.9)
GFR NON-AFRICAN AMERICAN: 9
GLUCOSE BLD-MCNC: 287 MG/DL (ref 74–109)
HCT VFR BLD CALC: 38.1 % (ref 37–47)
HEMOGLOBIN: 12.4 G/DL (ref 12–16)
LIPASE: 86 U/L (ref 13–60)
MCH RBC QN AUTO: 31.8 PG (ref 27–31)
MCHC RBC AUTO-ENTMCNC: 32.5 G/DL (ref 33–37)
MCV RBC AUTO: 97.7 FL (ref 81–99)
PDW BLD-RTO: 14.8 % (ref 11.5–14.5)
PLATELET # BLD: 416 K/UL (ref 130–400)
PMV BLD AUTO: 8.8 FL (ref 9.4–12.3)
POTASSIUM SERPL-SCNC: 6.9 MMOL/L (ref 3.5–5)
RBC # BLD: 3.9 M/UL (ref 4.2–5.4)
SODIUM BLD-SCNC: 129 MMOL/L (ref 136–145)
TOTAL PROTEIN: 7.9 G/DL (ref 6.6–8.7)
WBC # BLD: 8.9 K/UL (ref 4.8–10.8)

## 2018-06-23 PROCEDURE — 80053 COMPREHEN METABOLIC PANEL: CPT

## 2018-06-23 PROCEDURE — 36415 COLL VENOUS BLD VENIPUNCTURE: CPT

## 2018-06-23 PROCEDURE — 83690 ASSAY OF LIPASE: CPT

## 2018-06-23 PROCEDURE — 93005 ELECTROCARDIOGRAM TRACING: CPT

## 2018-06-23 PROCEDURE — 2580000003 HC RX 258: Performed by: EMERGENCY MEDICINE

## 2018-06-23 PROCEDURE — 6360000002 HC RX W HCPCS: Performed by: EMERGENCY MEDICINE

## 2018-06-23 PROCEDURE — 96375 TX/PRO/DX INJ NEW DRUG ADDON: CPT

## 2018-06-23 PROCEDURE — 6370000000 HC RX 637 (ALT 250 FOR IP): Performed by: EMERGENCY MEDICINE

## 2018-06-23 PROCEDURE — 99284 EMERGENCY DEPT VISIT MOD MDM: CPT

## 2018-06-23 PROCEDURE — 99284 EMERGENCY DEPT VISIT MOD MDM: CPT | Performed by: EMERGENCY MEDICINE

## 2018-06-23 PROCEDURE — G0257 UNSCHED DIALYSIS ESRD PT HOS: HCPCS

## 2018-06-23 PROCEDURE — 96374 THER/PROPH/DIAG INJ IV PUSH: CPT

## 2018-06-23 PROCEDURE — 85027 COMPLETE CBC AUTOMATED: CPT

## 2018-06-23 RX ORDER — ONDANSETRON 4 MG/1
4 TABLET, ORALLY DISINTEGRATING ORAL ONCE
Status: COMPLETED | OUTPATIENT
Start: 2018-06-23 | End: 2018-06-23

## 2018-06-23 RX ORDER — CALCIUM GLUCONATE 94 MG/ML
1 INJECTION, SOLUTION INTRAVENOUS ONCE
Status: COMPLETED | OUTPATIENT
Start: 2018-06-23 | End: 2018-06-23

## 2018-06-23 RX ORDER — DEXTROSE MONOHYDRATE 25 G/50ML
25 INJECTION, SOLUTION INTRAVENOUS ONCE
Status: COMPLETED | OUTPATIENT
Start: 2018-06-23 | End: 2018-06-23

## 2018-06-23 RX ORDER — CALCIUM GLUCONATE 94 MG/ML
1 INJECTION, SOLUTION INTRAVENOUS ONCE
Status: DISCONTINUED | OUTPATIENT
Start: 2018-06-23 | End: 2018-06-23 | Stop reason: SDUPTHER

## 2018-06-23 RX ADMIN — ONDANSETRON 4 MG: 4 TABLET, ORALLY DISINTEGRATING ORAL at 12:18

## 2018-06-23 RX ADMIN — INSULIN HUMAN 10 UNITS: 100 INJECTION, SOLUTION PARENTERAL at 13:58

## 2018-06-23 RX ADMIN — DEXTROSE MONOHYDRATE 25 G: 25 INJECTION, SOLUTION INTRAVENOUS at 13:54

## 2018-06-23 RX ADMIN — Medication 1 G: at 13:55

## 2018-06-23 ASSESSMENT — ENCOUNTER SYMPTOMS
VOMITING: 1
NAUSEA: 1
EYE PAIN: 0
ABDOMINAL PAIN: 1
DIARRHEA: 1
SHORTNESS OF BREATH: 0

## 2018-06-23 ASSESSMENT — PAIN DESCRIPTION - DESCRIPTORS: DESCRIPTORS: CRAMPING

## 2018-06-23 ASSESSMENT — PAIN SCALES - GENERAL: PAINLEVEL_OUTOF10: 5

## 2018-06-23 ASSESSMENT — PAIN DESCRIPTION - LOCATION: LOCATION: ABDOMEN

## 2018-06-28 LAB
EKG P AXIS: 59 DEGREES
EKG P-R INTERVAL: 142 MS
EKG Q-T INTERVAL: 390 MS
EKG QRS DURATION: 78 MS
EKG QTC CALCULATION (BAZETT): 419 MS
EKG T AXIS: 23 DEGREES

## 2018-07-11 ENCOUNTER — OFFICE VISIT (OUTPATIENT)
Dept: PRIMARY CARE CLINIC | Age: 58
End: 2018-07-11
Payer: MEDICARE

## 2018-07-11 VITALS
HEIGHT: 59 IN | WEIGHT: 137 LBS | SYSTOLIC BLOOD PRESSURE: 118 MMHG | OXYGEN SATURATION: 98 % | TEMPERATURE: 97.8 F | DIASTOLIC BLOOD PRESSURE: 68 MMHG | HEART RATE: 78 BPM | RESPIRATION RATE: 18 BRPM | BODY MASS INDEX: 27.62 KG/M2

## 2018-07-11 DIAGNOSIS — F41.9 ANXIETY: ICD-10-CM

## 2018-07-11 DIAGNOSIS — E11.39 TYPE 2 DIABETES MELLITUS WITH OTHER OPHTHALMIC COMPLICATION, UNSPECIFIED LONG TERM INSULIN USE STATUS: ICD-10-CM

## 2018-07-11 DIAGNOSIS — R74.8 ABNORMAL SERUM LEVEL OF LIPASE: ICD-10-CM

## 2018-07-11 DIAGNOSIS — E87.5 HYPERKALEMIA: ICD-10-CM

## 2018-07-11 DIAGNOSIS — Z09 HOSPITAL DISCHARGE FOLLOW-UP: Primary | ICD-10-CM

## 2018-07-11 DIAGNOSIS — R74.8 ABNORMAL SERUM LEVEL OF AMYLASE: ICD-10-CM

## 2018-07-11 DIAGNOSIS — R10.9 ABDOMINAL PAIN, UNSPECIFIED ABDOMINAL LOCATION: ICD-10-CM

## 2018-07-11 LAB
ALBUMIN SERPL-MCNC: 3.3 G/DL (ref 3.5–5.2)
ALP BLD-CCNC: 103 U/L (ref 35–104)
ALT SERPL-CCNC: 7 U/L (ref 5–33)
AMYLASE: 77 U/L (ref 28–100)
ANION GAP SERPL CALCULATED.3IONS-SCNC: 15 MMOL/L (ref 7–19)
AST SERPL-CCNC: 13 U/L (ref 5–32)
BILIRUB SERPL-MCNC: 0.3 MG/DL (ref 0.2–1.2)
BUN BLDV-MCNC: 19 MG/DL (ref 8–23)
CALCIUM SERPL-MCNC: 9.4 MG/DL (ref 8.8–10.2)
CHLORIDE BLD-SCNC: 95 MMOL/L (ref 98–111)
CO2: 30 MMOL/L (ref 22–29)
CREAT SERPL-MCNC: 3.4 MG/DL (ref 0.5–0.9)
GFR NON-AFRICAN AMERICAN: 14
GLUCOSE BLD-MCNC: 247 MG/DL (ref 74–109)
HCT VFR BLD CALC: 35.8 % (ref 37–47)
HEMOGLOBIN: 10.9 G/DL (ref 12–16)
LIPASE: 92 U/L (ref 13–60)
MCH RBC QN AUTO: 31.6 PG (ref 27–31)
MCHC RBC AUTO-ENTMCNC: 30.4 G/DL (ref 33–37)
MCV RBC AUTO: 103.8 FL (ref 81–99)
PDW BLD-RTO: 15.6 % (ref 11.5–14.5)
PLATELET # BLD: 359 K/UL (ref 130–400)
PMV BLD AUTO: 8.5 FL (ref 9.4–12.3)
POTASSIUM SERPL-SCNC: 4.2 MMOL/L (ref 3.5–5)
RBC # BLD: 3.45 M/UL (ref 4.2–5.4)
SODIUM BLD-SCNC: 140 MMOL/L (ref 136–145)
TOTAL PROTEIN: 6.9 G/DL (ref 6.6–8.7)
WBC # BLD: 6.1 K/UL (ref 4.8–10.8)

## 2018-07-11 PROCEDURE — G8427 DOCREV CUR MEDS BY ELIG CLIN: HCPCS | Performed by: NURSE PRACTITIONER

## 2018-07-11 PROCEDURE — 3017F COLORECTAL CA SCREEN DOC REV: CPT | Performed by: NURSE PRACTITIONER

## 2018-07-11 PROCEDURE — 2022F DILAT RTA XM EVC RTNOPTHY: CPT | Performed by: NURSE PRACTITIONER

## 2018-07-11 PROCEDURE — 3044F HG A1C LEVEL LT 7.0%: CPT | Performed by: NURSE PRACTITIONER

## 2018-07-11 PROCEDURE — G8417 CALC BMI ABV UP PARAM F/U: HCPCS | Performed by: NURSE PRACTITIONER

## 2018-07-11 PROCEDURE — 1111F DSCHRG MED/CURRENT MED MERGE: CPT | Performed by: NURSE PRACTITIONER

## 2018-07-11 PROCEDURE — 99214 OFFICE O/P EST MOD 30 MIN: CPT | Performed by: NURSE PRACTITIONER

## 2018-07-11 PROCEDURE — 1036F TOBACCO NON-USER: CPT | Performed by: NURSE PRACTITIONER

## 2018-07-11 RX ORDER — LACTULOSE 10 G/15ML
20 SOLUTION ORAL 3 TIMES DAILY
COMMUNITY
End: 2019-09-25 | Stop reason: SDUPTHER

## 2018-07-11 RX ORDER — SERTRALINE HYDROCHLORIDE 25 MG/1
25 TABLET, FILM COATED ORAL DAILY
Qty: 30 TABLET | Refills: 1 | Status: SHIPPED | OUTPATIENT
Start: 2018-07-11 | End: 2018-09-14 | Stop reason: SDUPTHER

## 2018-07-11 ASSESSMENT — ENCOUNTER SYMPTOMS
SHORTNESS OF BREATH: 0
COUGH: 0
WHEEZING: 0
DIARRHEA: 0
TROUBLE SWALLOWING: 0
RHINORRHEA: 0
EYE REDNESS: 0
BLOOD IN STOOL: 0
ABDOMINAL PAIN: 0
VOICE CHANGE: 0
SORE THROAT: 0
VOMITING: 0
NAUSEA: 0
CONSTIPATION: 0
CHEST TIGHTNESS: 0

## 2018-07-11 NOTE — PATIENT INSTRUCTIONS
Begin taking sertraline 25mg at bedtime to help with anxiety. Follow up in one month. Continue medication for constipation.

## 2018-07-11 NOTE — PROGRESS NOTES
Post-Discharge Transitional Care Management Services      Bella Barros   YOB: 1960    Date of Office Visit:  7/11/2018  Date of Hospital Admission: 6/23/18  Date of Hospital Discharge: 6/23/18  Readmission Risk Score [risk of hospital readmission >=10  medium risk (chance of readmission ~ 12%) >14  high risk (chance of readmission ~18%)]:Readmission Risk Score: 10    Care management risk score Rising risk (score 2-5) and Complex Care (Scores >=6): 6       Patient Active Problem List   Diagnosis    Diabetes mellitus with ophthalmic complication ([address]    CKD (chronic kidney disease), stage V ([address]    Arthropathy    Proteinuria    Disorder of phosphorus metabolism    HTN (hypertension)    Glaucoma    CKD (chronic kidney disease), stage IV (HCC)    Diabetic nephropathy (HCC)    Breast density    CKD (chronic [facility] disease) stage V requiring [facility]    Dialysis AV fistula malfunction ([facility]    ESRD (end stage renal disease) on dialysis (HCC)       Allergies   Allergen Reactions    Sulfa Antibiotics      Causes hypoglycemia     Wellbutrin [Bupropion Hcl] Nausea Only    Azithromycin Rash    Penicillins Rash       Medications listed as ordered at the time of discharge from [facility] Medication Instructions CLARK:    Printed on:07/11/18 4590   Medication Information                      aspirin EC 81 MG EC tablet  Take 81 mg by mouth daily             bimatoprost 0.01 % SOLN  Apply 1 drop to eye nightly. One drop in left eye at              blood glucose monitor kit and supplies  1 kit by Other route 3 times daily Test three times a day & as needed for symptoms of irregular blood glucose. Blood Glucose Monitoring Suppl NIKOS  by Does not apply route. Pt will also need new lancet device if not included in kit.              brimonidine (ALPHAGAN P) 0.15 % ophthalmic solution  Place 1 drop into the left eye 3 times daily

## 2018-07-11 NOTE — PROGRESS NOTES
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 [address], [facility]    Hypertension     2000, dx'd at same time as the DM    Obesity     Renal osteodystrophy     Smoker     Type II or unspecified type diabetes mellitus with renal manifestations, uncontrolled(250.42)        Past Surgical History:   Procedure Laterality Date    CARPAL TUNNEL RELEASE      CATARACT REMOVAL     [address], and 1979    CHOLECYSTECTOMY      COLONOSCOPY N/A 3/9/2018    Dr ReevesGygayp-Nneyyfkpcejcou-Kkdfhnctqmtfm AP (-) dysplasia x 1, tubular AP x  (-) dysplasia x 1, HP x 4--1 yr recall    DIALYSIS FISTULA CREATION Left 4/23/15 [facility]    Left proximal ulnar artery to cephalic vein AVF creation    EYE SURGERY      laser, several times     TYMPANOSTOMY TUBE PLACEMENT Bilateral     VASCULAR SURGERY Left 5/11/15 S    US-guided cannulation left distal upper arm cephalic vein with 4-Slovenian glide sheath; LUE AV f'grams with venography SVC; left cephalic vein BAM with [id number] julieth balloon; completion venogram LUE    VASCULAR SURGERY Left 5/28/15 S    Percutaneous cannulation left distal radial artery with 4-Slovenian glide sheath; LUE AV f'grams with venography SVC; left cephalic vein balloon a'plasty with [id number] julieth balloon and [id number] julieth balloon; proximal and mid upper arm cephalic vein BAM with [id number] julieth balloon; completion venogram E    VASCULAR SURGERY Left 6/15/15 S    US-guided cannulation left cephalic vein with 4-Slovenian and later 7-Slovenian sheath; LUE AV f'grams including venography SVC; left cephalic vein stenosis balloon a'plasty with [id number] cutting balloon; completion f'gram and venogram E    VASCULAR SURGERY  05/15/2017    S. Left upper fistulograms/venograms.  VASCULAR SURGERY  02/14/2018    S. Left upper fistulograms, left subclavian BA 12x40 atlas, left cephalic arch BA normal.   Nose: Nose normal.   Mouth/Throat: Oropharynx is clear and moist. No oropharyngeal exudate. Eyes: Conjunctivae are normal. Pupils are equal, round, and reactive to light. Right eye exhibits no discharge. Left eye exhibits no discharge. Neck: Normal range of motion. Neck supple. Cardiovascular: Normal rate, regular rhythm, normal heart sounds and intact distal pulses. No murmur heard. Pulmonary/Chest: Effort normal and breath sounds normal. No stridor. No respiratory distress. She has no wheezes. She has no rales. She exhibits no tenderness. Right breast exhibits no inverted nipple, no mass, no nipple discharge, no skin change and no tenderness. Left breast exhibits no inverted nipple, no mass, no nipple discharge, no skin change and no tenderness. Abdominal: Soft. Bowel sounds are normal. She exhibits no distension. There is no tenderness. Musculoskeletal: Normal range of motion. She exhibits no edema, tenderness or deformity. Neurological: She is alert and oriented to person, place, and time. She has normal reflexes. No cranial nerve deficit. Coordination normal.   Skin: Skin is warm and dry. No rash noted. She is not diaphoretic. No erythema. There is pallor. Psychiatric: She has a normal mood and affect. Her behavior is normal. Thought content normal.   Nursing note and vitals reviewed. /68   Pulse 78   Temp 97.8 °F (36.6 °C) (Temporal)   Resp 18   Ht 4' 11\" (1.499 m)   Wt 137 lb (62.1 kg)   SpO2 98%   BMI 27.67 kg/m²     Assessment:      Diagnosis Orders   1. Hospital discharge follow-up  CA DISCHARGE MEDS RECONCILED W/ CURRENT OUTPATIENT MED LIST   2. Hyperkalemia  Comprehensive Metabolic Panel    CBC   3. Abnormal serum level of lipase  Lipase   4. Abnormal serum level of amylase  Amylase   5. Type 2 diabetes mellitus with other ophthalmic complication, unspecified long term insulin use status ([facility])  blood glucose monitor kit and supplies   6.  Anxiety  sertraline

## 2018-07-17 ENCOUNTER — TELEPHONE (OUTPATIENT)
Dept: PRIMARY CARE CLINIC | Age: 58
End: 2018-07-17

## 2018-07-18 RX ORDER — PEN NEEDLE, DIABETIC 31 GX5/16"
NEEDLE, DISPOSABLE MISCELLANEOUS
Qty: 100 EACH | Refills: 0 | Status: SHIPPED | OUTPATIENT
Start: 2018-07-18 | End: 2018-10-22 | Stop reason: SDUPTHER

## 2018-09-10 ENCOUNTER — OFFICE VISIT (OUTPATIENT)
Dept: PRIMARY CARE CLINIC | Age: 58
End: 2018-09-10
Payer: MEDICARE

## 2018-09-10 VITALS
HEIGHT: 59 IN | BODY MASS INDEX: 29.07 KG/M2 | TEMPERATURE: 96.7 F | SYSTOLIC BLOOD PRESSURE: 102 MMHG | OXYGEN SATURATION: 97 % | WEIGHT: 144.2 LBS | DIASTOLIC BLOOD PRESSURE: 68 MMHG | HEART RATE: 77 BPM

## 2018-09-10 DIAGNOSIS — Z99.2 CKD (CHRONIC KIDNEY DISEASE) STAGE V REQUIRING CHRONIC DIALYSIS (HCC): ICD-10-CM

## 2018-09-10 DIAGNOSIS — N18.6 CKD (CHRONIC KIDNEY DISEASE) STAGE V REQUIRING CHRONIC DIALYSIS (HCC): ICD-10-CM

## 2018-09-10 DIAGNOSIS — F41.9 ANXIETY: Primary | ICD-10-CM

## 2018-09-10 DIAGNOSIS — E11.21 DIABETIC NEPHROPATHY ASSOCIATED WITH TYPE 2 DIABETES MELLITUS (HCC): ICD-10-CM

## 2018-09-10 DIAGNOSIS — I10 ESSENTIAL HYPERTENSION: ICD-10-CM

## 2018-09-10 LAB
ALBUMIN SERPL-MCNC: 4.5 G/DL (ref 3.5–5.2)
ALP BLD-CCNC: 109 U/L (ref 35–104)
ALT SERPL-CCNC: 11 U/L (ref 5–33)
ANION GAP SERPL CALCULATED.3IONS-SCNC: 19 MMOL/L (ref 7–19)
AST SERPL-CCNC: 16 U/L (ref 5–32)
BILIRUB SERPL-MCNC: <0.2 MG/DL (ref 0.2–1.2)
BUN BLDV-MCNC: 51 MG/DL (ref 8–23)
CALCIUM SERPL-MCNC: 9.7 MG/DL (ref 8.8–10.2)
CHLORIDE BLD-SCNC: 93 MMOL/L (ref 98–111)
CO2: 26 MMOL/L (ref 22–29)
CREAT SERPL-MCNC: 4.5 MG/DL (ref 0.5–0.9)
GFR NON-AFRICAN AMERICAN: 10
GLUCOSE BLD-MCNC: 197 MG/DL (ref 74–109)
HBA1C MFR BLD: 7.2 % (ref 4–6)
POTASSIUM SERPL-SCNC: 4.1 MMOL/L (ref 3.5–5)
SODIUM BLD-SCNC: 138 MMOL/L (ref 136–145)
TOTAL PROTEIN: 7.4 G/DL (ref 6.6–8.7)

## 2018-09-10 PROCEDURE — G8417 CALC BMI ABV UP PARAM F/U: HCPCS | Performed by: FAMILY MEDICINE

## 2018-09-10 PROCEDURE — 2022F DILAT RTA XM EVC RTNOPTHY: CPT | Performed by: FAMILY MEDICINE

## 2018-09-10 PROCEDURE — 99213 OFFICE O/P EST LOW 20 MIN: CPT | Performed by: FAMILY MEDICINE

## 2018-09-10 PROCEDURE — 1036F TOBACCO NON-USER: CPT | Performed by: FAMILY MEDICINE

## 2018-09-10 PROCEDURE — 3017F COLORECTAL CA SCREEN DOC REV: CPT | Performed by: FAMILY MEDICINE

## 2018-09-10 PROCEDURE — G8427 DOCREV CUR MEDS BY ELIG CLIN: HCPCS | Performed by: FAMILY MEDICINE

## 2018-09-10 PROCEDURE — 3044F HG A1C LEVEL LT 7.0%: CPT | Performed by: FAMILY MEDICINE

## 2018-09-10 ASSESSMENT — ENCOUNTER SYMPTOMS
COLOR CHANGE: 0
SHORTNESS OF BREATH: 0
COUGH: 0

## 2018-09-10 NOTE — PROGRESS NOTES
Romulo Messer is a 59 y.o. female    Chief Complaint   Patient presents with    Follow-up     1 month       HPI  Romulo Messer presents to the office for follow-up of anxiety and depression. Patient was recently started on Zoloft 25 mg by mouth nightly. Patient states her mood has improved significantly. Patient denies any side effects to the medication. Review of Systems   Constitutional: Negative for chills and fever. Respiratory: Negative for cough and shortness of breath. Cardiovascular: Negative for chest pain and leg swelling. Skin: Negative for color change and rash. Psychiatric/Behavioral: Negative for dysphoric mood. The patient is nervous/anxious. Prior to Admission medications    Medication Sig Start Date End Date Taking? Authorizing Provider   B-D ULTRAFINE III SHORT PEN 31G X 8 MM MISC USE TO INJECT INSULIN ONCE DAILY 7/18/18  Yes Temple MD Shanthi   Cholecalciferol (VITAMIN D3) 1000 units CAPS Take by mouth   Yes Historical Provider, MD   lactulose (CHRONULAC) 10 GM/15ML solution Take 20 g by mouth 3 times daily   Yes Historical Provider, MD   blood glucose monitor kit and supplies 1 kit by Other route 3 times daily Test three times a day & as needed for symptoms of irregular blood glucose.  7/11/18 9/10/18 Yes RAMONA Delgadillo   sertraline (ZOLOFT) 25 MG tablet Take 1 tablet by mouth daily 7/11/18 9/10/18 Yes RAMONA Delgadillo   ranitidine (ZANTAC) 150 MG tablet Take 150 mg by mouth daily   Yes Historical Provider, MD   Travoprost, BAK Free, (TRAVATAN Z) 0.004 % SOLN ophthalmic solution 1 drop nightly   Yes Historical Provider, MD   insulin glargine (LANTUS) 100 UNIT/ML injection vial Inject 10 Units into the skin   Yes Historical Provider, MD   midodrine (PROAMATINE) 5 MG tablet TK 1 T PO [facility] TREATMENT AS DIRECTED 10/17/17  Yes Historical Provider, MD   sevelamer (RENVELA) 800 MG tablet Take 1 tablet by mouth daily    Yes Historical before that, was 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 [address], [facility]    Hypertension     2000, dx'd at same time as the DM    Obesity     Renal osteodystrophy     Smoker     Type II or unspecified type diabetes mellitus with renal manifestations, uncontrolled(250.42)        Past Surgical History:   Procedure Laterality Date    CARPAL TUNNEL RELEASE      CATARACT REMOVAL     [address], and 1979    CHOLECYSTECTOMY      COLONOSCOPY N/A 3/9/2018    Dr ReevesFtfsay-Hqmvhnyxdynfcs-Zvtvtyvdedyeg AP (-) dysplasia x 1, tubular AP x  (-) dysplasia x 1, HP x 4--1 yr recall    DIALYSIS FISTULA CREATION Left 4/23/15 [facility]    Left proximal ulnar artery to cephalic vein AVF creation    EYE SURGERY      laser, several times     TYMPANOSTOMY TUBE PLACEMENT Bilateral     VASCULAR SURGERY Left 5/11/15 S    US-guided cannulation left distal upper arm cephalic vein with 4-Kosovan glide sheath; LUE AV f'grams with venography SVC; left cephalic vein BAM with [id number] julieth balloon; completion venogram LUE    VASCULAR SURGERY Left 5/28/15 S    Percutaneous cannulation left distal radial artery with 4-Kosovan glide sheath; LUE AV f'grams with venography SVC; left cephalic vein balloon a'plasty with 9jws32ly julieth balloon and [id number] julieth balloon; proximal and mid upper arm cephalic vein BAM with [id number] julieth balloon; completion venogram E    VASCULAR SURGERY Left 6/15/15 S    US-guided cannulation left cephalic vein with 4-Kosovan and later 7-Kosovan sheath; LUE AV f'grams including venography SVC; left cephalic vein stenosis balloon a'plasty with [id number] cutting balloon; completion f'gram and venogram E    VASCULAR SURGERY  05/15/2017    S. Left upper fistulograms/venograms.  VASCULAR SURGERY  02/14/2018    S. Left upper fistulograms, left subclavian BA 12x40 atlas, left cephalic arch BA 10x40 [employer].       Social History     Social History    Marital status:      Spouse name: N/A    Number of children: N/A    Years of education: N/A     Social History Main Topics    Smoking status: Former Smoker     Packs/day: 1.00     Years: 40.00     Quit date: 6/10/2018    Smokeless tobacco: Never Used    Alcohol use No    Drug use: Yes     Types: Marijuana      Comment: Once in a while for vision    Sexual activity: Not Asked     Other Topics Concern    None     Social History Narrative    None       Physical Exam   Constitutional: She is oriented to person, place, and time. She appears well-developed and well-nourished. No distress. HENT:   Head: Normocephalic and atraumatic. Eyes: Conjunctivae are normal. No scleral icterus. Neck: Normal range of motion. Neck supple. No thyromegaly present. Cardiovascular: Normal rate, regular rhythm, normal heart sounds and intact distal pulses. No murmur heard. Pulmonary/Chest: Effort normal and breath sounds normal. No respiratory distress. Musculoskeletal: She exhibits no edema. Neurological: She is alert and oriented to person, place, and time. Skin: Skin is warm and dry. Psychiatric: She has a normal mood and affect. Her behavior is normal.   Nursing note and vitals reviewed. Assessment    ICD-10-CM ICD-9-CM    1. Anxiety F41.9 300.00 Continue zoloft 25 mg po nightly. 2. Essential hypertension I10 401.9 The current medical regimen is effective;  continue present plan and medications. Comprehensive Metabolic Panel      Hemoglobin A1C   3. Diabetic nephropathy associated with type 2 diabetes mellitus (HCC) E11.21 250.40 Comprehensive Metabolic Panel     532.84 Hemoglobin A1C   4. CKD (chronic kidney disease) stage V requiring chronic dialysis ([facility]) N18.6 585.6     Z99.2 V45.11            Plan    No orders of the defined types were placed in this encounter.       Orders Placed This Encounter   Procedures    Comprehensive

## 2018-10-23 RX ORDER — PEN NEEDLE, DIABETIC 31 GX5/16"
NEEDLE, DISPOSABLE MISCELLANEOUS
Qty: 100 EACH | Refills: 0 | Status: SHIPPED | OUTPATIENT
Start: 2018-10-23 | End: 2019-01-24 | Stop reason: SDUPTHER

## 2018-10-23 RX ORDER — PRAVASTATIN SODIUM 10 MG
TABLET ORAL
Qty: 30 TABLET | Refills: 0 | Status: SHIPPED | OUTPATIENT
Start: 2018-10-23 | End: 2018-11-19 | Stop reason: SDUPTHER

## 2018-11-19 DIAGNOSIS — E78.2 MIXED HYPERLIPIDEMIA: Primary | ICD-10-CM

## 2018-11-20 RX ORDER — PRAVASTATIN SODIUM 10 MG
TABLET ORAL
Qty: 30 TABLET | Refills: 0 | Status: SHIPPED | OUTPATIENT
Start: 2018-11-22 | End: 2018-12-18 | Stop reason: SDUPTHER

## 2019-01-23 ENCOUNTER — OFFICE VISIT (OUTPATIENT)
Dept: PRIMARY CARE CLINIC | Age: 59
End: 2019-01-23
Payer: MEDICARE

## 2019-01-23 VITALS
HEART RATE: 93 BPM | OXYGEN SATURATION: 98 % | HEIGHT: 59 IN | TEMPERATURE: 97.4 F | BODY MASS INDEX: 29.03 KG/M2 | SYSTOLIC BLOOD PRESSURE: 148 MMHG | DIASTOLIC BLOOD PRESSURE: 70 MMHG | WEIGHT: 144 LBS

## 2019-01-23 DIAGNOSIS — K21.9 GASTROESOPHAGEAL REFLUX DISEASE WITHOUT ESOPHAGITIS: ICD-10-CM

## 2019-01-23 DIAGNOSIS — Z99.2 CKD (CHRONIC KIDNEY DISEASE) STAGE V REQUIRING CHRONIC DIALYSIS (HCC): Primary | ICD-10-CM

## 2019-01-23 DIAGNOSIS — E11.21 DIABETIC NEPHROPATHY ASSOCIATED WITH TYPE 2 DIABETES MELLITUS (HCC): ICD-10-CM

## 2019-01-23 DIAGNOSIS — E11.8 DIABETIC FOOT (HCC): ICD-10-CM

## 2019-01-23 DIAGNOSIS — N18.6 CKD (CHRONIC KIDNEY DISEASE) STAGE V REQUIRING CHRONIC DIALYSIS (HCC): Primary | ICD-10-CM

## 2019-01-23 DIAGNOSIS — I10 ESSENTIAL HYPERTENSION: ICD-10-CM

## 2019-01-23 DIAGNOSIS — E11.39 TYPE 2 DIABETES MELLITUS WITH OTHER OPHTHALMIC COMPLICATION, UNSPECIFIED WHETHER LONG TERM INSULIN USE (HCC): ICD-10-CM

## 2019-01-23 PROCEDURE — G8427 DOCREV CUR MEDS BY ELIG CLIN: HCPCS | Performed by: FAMILY MEDICINE

## 2019-01-23 PROCEDURE — 99214 OFFICE O/P EST MOD 30 MIN: CPT | Performed by: FAMILY MEDICINE

## 2019-01-23 PROCEDURE — G8484 FLU IMMUNIZE NO ADMIN: HCPCS | Performed by: FAMILY MEDICINE

## 2019-01-23 PROCEDURE — G8417 CALC BMI ABV UP PARAM F/U: HCPCS | Performed by: FAMILY MEDICINE

## 2019-01-23 PROCEDURE — 2022F DILAT RTA XM EVC RTNOPTHY: CPT | Performed by: FAMILY MEDICINE

## 2019-01-23 PROCEDURE — 3046F HEMOGLOBIN A1C LEVEL >9.0%: CPT | Performed by: FAMILY MEDICINE

## 2019-01-23 PROCEDURE — 3017F COLORECTAL CA SCREEN DOC REV: CPT | Performed by: FAMILY MEDICINE

## 2019-01-23 PROCEDURE — 1036F TOBACCO NON-USER: CPT | Performed by: FAMILY MEDICINE

## 2019-01-23 RX ORDER — FAMOTIDINE 20 MG/1
20 TABLET, FILM COATED ORAL 2 TIMES DAILY
Qty: 60 TABLET | Refills: 3 | Status: SHIPPED | OUTPATIENT
Start: 2019-01-23 | End: 2019-05-31 | Stop reason: SDUPTHER

## 2019-01-23 ASSESSMENT — ENCOUNTER SYMPTOMS
COLOR CHANGE: 0
SHORTNESS OF BREATH: 0
COUGH: 0

## 2019-01-24 RX ORDER — PEN NEEDLE, DIABETIC 31 GX5/16"
NEEDLE, DISPOSABLE MISCELLANEOUS
Qty: 100 EACH | Refills: 3 | Status: SHIPPED | OUTPATIENT
Start: 2019-01-24 | End: 2020-03-16

## 2019-02-08 ENCOUNTER — TELEPHONE (OUTPATIENT)
Dept: VASCULAR SURGERY | Age: 59
End: 2019-02-08

## 2019-02-12 ENCOUNTER — PREP FOR PROCEDURE (OUTPATIENT)
Dept: VASCULAR SURGERY | Age: 59
End: 2019-02-12

## 2019-02-12 RX ORDER — CLONIDINE HYDROCHLORIDE 0.1 MG/1
0.1 TABLET ORAL PRN
Status: CANCELLED | OUTPATIENT
Start: 2019-02-12

## 2019-02-12 RX ORDER — ASPIRIN 81 MG/1
81 TABLET ORAL ONCE
Status: CANCELLED | OUTPATIENT
Start: 2019-02-12 | End: 2019-02-12

## 2019-02-12 RX ORDER — SODIUM CHLORIDE 9 MG/ML
INJECTION, SOLUTION INTRAVENOUS CONTINUOUS
Status: CANCELLED | OUTPATIENT
Start: 2019-02-12

## 2019-02-12 RX ORDER — SODIUM CHLORIDE 0.9 % (FLUSH) 0.9 %
10 SYRINGE (ML) INJECTION PRN
Status: CANCELLED | OUTPATIENT
Start: 2019-02-12

## 2019-02-13 ENCOUNTER — HOSPITAL ENCOUNTER (OUTPATIENT)
Dept: INTERVENTIONAL RADIOLOGY/VASCULAR | Age: 59
Discharge: HOME | End: 2019-02-13
Payer: MEDICARE

## 2019-02-13 VITALS
DIASTOLIC BLOOD PRESSURE: 76 MMHG | RESPIRATION RATE: 16 BRPM | SYSTOLIC BLOOD PRESSURE: 167 MMHG | HEIGHT: 59 IN | WEIGHT: 144 LBS | HEART RATE: 88 BPM | TEMPERATURE: 98 F | OXYGEN SATURATION: 96 % | BODY MASS INDEX: 29.03 KG/M2

## 2019-02-13 DIAGNOSIS — N18.6 CKD (CHRONIC KIDNEY DISEASE) STAGE V REQUIRING CHRONIC DIALYSIS (HCC): ICD-10-CM

## 2019-02-13 DIAGNOSIS — Z99.2 CKD (CHRONIC KIDNEY DISEASE) STAGE V REQUIRING CHRONIC DIALYSIS (HCC): ICD-10-CM

## 2019-02-13 PROCEDURE — 6360000004 HC RX CONTRAST MEDICATION: Performed by: SURGERY

## 2019-02-13 PROCEDURE — 99153 MOD SED SAME PHYS/QHP EA: CPT

## 2019-02-13 PROCEDURE — 99152 MOD SED SAME PHYS/QHP 5/>YRS: CPT

## 2019-02-13 PROCEDURE — 2580000003 HC RX 258: Performed by: NURSE PRACTITIONER

## 2019-02-13 PROCEDURE — 2709999900 IR GUIDED INTRO CATH DIALYSIS CIRCUIT

## 2019-02-13 PROCEDURE — 36901 INTRO CATH DIALYSIS CIRCUIT: CPT | Performed by: SURGERY

## 2019-02-13 PROCEDURE — 36908 STENT PLMT CTR DIALYSIS SEG: CPT

## 2019-02-13 PROCEDURE — 36901 INTRO CATH DIALYSIS CIRCUIT: CPT

## 2019-02-13 PROCEDURE — 6360000002 HC RX W HCPCS: Performed by: SURGERY

## 2019-02-13 PROCEDURE — 2500000003 HC RX 250 WO HCPCS: Performed by: SURGERY

## 2019-02-13 PROCEDURE — 36908 STENT PLMT CTR DIALYSIS SEG: CPT | Performed by: SURGERY

## 2019-02-13 RX ORDER — HYDROCODONE BITARTRATE AND ACETAMINOPHEN 5; 325 MG/1; MG/1
1 TABLET ORAL EVERY 4 HOURS PRN
Status: DISCONTINUED | OUTPATIENT
Start: 2019-02-13 | End: 2019-02-15 | Stop reason: HOSPADM

## 2019-02-13 RX ORDER — ASPIRIN 81 MG/1
81 TABLET ORAL ONCE
Status: DISCONTINUED | OUTPATIENT
Start: 2019-02-13 | End: 2019-02-15 | Stop reason: HOSPADM

## 2019-02-13 RX ORDER — HYDROCODONE BITARTRATE AND ACETAMINOPHEN 5; 325 MG/1; MG/1
2 TABLET ORAL EVERY 4 HOURS PRN
Status: DISCONTINUED | OUTPATIENT
Start: 2019-02-13 | End: 2019-02-15 | Stop reason: HOSPADM

## 2019-02-13 RX ORDER — FENTANYL CITRATE 50 UG/ML
INJECTION, SOLUTION INTRAMUSCULAR; INTRAVENOUS
Status: COMPLETED | OUTPATIENT
Start: 2019-02-13 | End: 2019-02-13

## 2019-02-13 RX ORDER — MIDAZOLAM HYDROCHLORIDE 1 MG/ML
INJECTION INTRAMUSCULAR; INTRAVENOUS
Status: COMPLETED | OUTPATIENT
Start: 2019-02-13 | End: 2019-02-13

## 2019-02-13 RX ORDER — HEPARIN SODIUM 5000 [USP'U]/ML
INJECTION, SOLUTION INTRAVENOUS; SUBCUTANEOUS
Status: COMPLETED | OUTPATIENT
Start: 2019-02-13 | End: 2019-02-13

## 2019-02-13 RX ORDER — SODIUM CHLORIDE 0.9 % (FLUSH) 0.9 %
10 SYRINGE (ML) INJECTION PRN
Status: DISCONTINUED | OUTPATIENT
Start: 2019-02-13 | End: 2019-02-15 | Stop reason: HOSPADM

## 2019-02-13 RX ORDER — ONDANSETRON 2 MG/ML
4 INJECTION INTRAMUSCULAR; INTRAVENOUS EVERY 8 HOURS PRN
Status: DISCONTINUED | OUTPATIENT
Start: 2019-02-13 | End: 2019-02-15 | Stop reason: HOSPADM

## 2019-02-13 RX ORDER — ACETAMINOPHEN 325 MG/1
650 TABLET ORAL EVERY 4 HOURS PRN
Status: DISCONTINUED | OUTPATIENT
Start: 2019-02-13 | End: 2019-02-15 | Stop reason: HOSPADM

## 2019-02-13 RX ORDER — LIDOCAINE HYDROCHLORIDE 20 MG/ML
INJECTION, SOLUTION INFILTRATION; PERINEURAL
Status: COMPLETED | OUTPATIENT
Start: 2019-02-13 | End: 2019-02-13

## 2019-02-13 RX ORDER — SODIUM CHLORIDE 9 MG/ML
INJECTION, SOLUTION INTRAVENOUS CONTINUOUS
Status: DISCONTINUED | OUTPATIENT
Start: 2019-02-13 | End: 2019-02-15 | Stop reason: HOSPADM

## 2019-02-13 RX ORDER — CLONIDINE HYDROCHLORIDE 0.1 MG/1
0.1 TABLET ORAL PRN
Status: DISCONTINUED | OUTPATIENT
Start: 2019-02-13 | End: 2019-02-15 | Stop reason: HOSPADM

## 2019-02-13 RX ADMIN — MIDAZOLAM 0.5 MG: 1 INJECTION INTRAMUSCULAR; INTRAVENOUS at 13:20

## 2019-02-13 RX ADMIN — FENTANYL CITRATE 25 MCG: 50 INJECTION INTRAMUSCULAR; INTRAVENOUS at 13:20

## 2019-02-13 RX ADMIN — SODIUM CHLORIDE: 9 INJECTION, SOLUTION INTRAVENOUS at 13:10

## 2019-02-13 RX ADMIN — HEPARIN SODIUM 5000 UNITS: 5000 INJECTION, SOLUTION INTRAVENOUS; SUBCUTANEOUS at 13:26

## 2019-02-13 RX ADMIN — IOPAMIDOL 15 ML: 612 INJECTION, SOLUTION INTRATHECAL at 13:49

## 2019-02-13 RX ADMIN — FENTANYL CITRATE 50 MCG: 50 INJECTION INTRAMUSCULAR; INTRAVENOUS at 13:41

## 2019-02-13 RX ADMIN — LIDOCAINE HYDROCHLORIDE 5 ML: 20 INJECTION, SOLUTION INFILTRATION; PERINEURAL at 13:26

## 2019-02-13 RX ADMIN — SODIUM CHLORIDE: 9 INJECTION, SOLUTION INTRAVENOUS at 15:00

## 2019-02-25 DIAGNOSIS — E78.2 MIXED HYPERLIPIDEMIA: ICD-10-CM

## 2019-02-25 RX ORDER — PRAVASTATIN SODIUM 10 MG
TABLET ORAL
Qty: 30 TABLET | Refills: 0 | Status: SHIPPED | OUTPATIENT
Start: 2019-02-25 | End: 2019-03-29 | Stop reason: SDUPTHER

## 2019-02-25 RX ORDER — INSULIN GLARGINE 100 [IU]/ML
INJECTION, SOLUTION SUBCUTANEOUS
Qty: 15 ML | Refills: 0 | Status: SHIPPED | OUTPATIENT
Start: 2019-02-25 | End: 2019-08-06 | Stop reason: SDUPTHER

## 2019-03-06 ENCOUNTER — OFFICE VISIT (OUTPATIENT)
Dept: PRIMARY CARE CLINIC | Age: 59
End: 2019-03-06
Payer: MEDICARE

## 2019-03-06 VITALS
HEART RATE: 89 BPM | WEIGHT: 143.8 LBS | OXYGEN SATURATION: 99 % | TEMPERATURE: 98 F | HEIGHT: 59 IN | DIASTOLIC BLOOD PRESSURE: 78 MMHG | SYSTOLIC BLOOD PRESSURE: 120 MMHG | BODY MASS INDEX: 28.99 KG/M2

## 2019-03-06 DIAGNOSIS — E11.39 TYPE 2 DIABETES MELLITUS WITH OTHER OPHTHALMIC COMPLICATION, UNSPECIFIED WHETHER LONG TERM INSULIN USE (HCC): ICD-10-CM

## 2019-03-06 DIAGNOSIS — N18.6 CKD (CHRONIC KIDNEY DISEASE) STAGE V REQUIRING CHRONIC DIALYSIS (HCC): ICD-10-CM

## 2019-03-06 DIAGNOSIS — Z87.891 PERSONAL HISTORY OF TOBACCO USE: Primary | ICD-10-CM

## 2019-03-06 DIAGNOSIS — Z99.2 CKD (CHRONIC KIDNEY DISEASE) STAGE V REQUIRING CHRONIC DIALYSIS (HCC): ICD-10-CM

## 2019-03-06 DIAGNOSIS — Z00.00 ROUTINE GENERAL MEDICAL EXAMINATION AT A HEALTH CARE FACILITY: ICD-10-CM

## 2019-03-06 DIAGNOSIS — Z13.220 SCREENING FOR HYPERLIPIDEMIA: ICD-10-CM

## 2019-03-06 PROCEDURE — G8482 FLU IMMUNIZE ORDER/ADMIN: HCPCS | Performed by: FAMILY MEDICINE

## 2019-03-06 PROCEDURE — 3046F HEMOGLOBIN A1C LEVEL >9.0%: CPT | Performed by: FAMILY MEDICINE

## 2019-03-06 PROCEDURE — G0438 PPPS, INITIAL VISIT: HCPCS | Performed by: FAMILY MEDICINE

## 2019-03-06 PROCEDURE — G0296 VISIT TO DETERM LDCT ELIG: HCPCS | Performed by: FAMILY MEDICINE

## 2019-03-06 ASSESSMENT — LIFESTYLE VARIABLES: HOW OFTEN DO YOU HAVE A DRINK CONTAINING ALCOHOL: 0

## 2019-03-06 ASSESSMENT — PATIENT HEALTH QUESTIONNAIRE - PHQ9
SUM OF ALL RESPONSES TO PHQ QUESTIONS 1-9: 0
SUM OF ALL RESPONSES TO PHQ QUESTIONS 1-9: 0

## 2019-03-06 ASSESSMENT — ANXIETY QUESTIONNAIRES: GAD7 TOTAL SCORE: 0

## 2019-03-29 DIAGNOSIS — E78.2 MIXED HYPERLIPIDEMIA: ICD-10-CM

## 2019-03-29 RX ORDER — PRAVASTATIN SODIUM 10 MG
TABLET ORAL
Qty: 30 TABLET | Refills: 0 | Status: SHIPPED | OUTPATIENT
Start: 2019-03-29 | End: 2019-04-29 | Stop reason: SDUPTHER

## 2019-04-29 DIAGNOSIS — E78.2 MIXED HYPERLIPIDEMIA: ICD-10-CM

## 2019-04-29 RX ORDER — PRAVASTATIN SODIUM 10 MG
TABLET ORAL
Qty: 30 TABLET | Refills: 0 | Status: SHIPPED | OUTPATIENT
Start: 2019-04-29 | End: 2019-05-31 | Stop reason: SDUPTHER

## 2019-05-01 NOTE — PROGRESS NOTES
[facility] - PODIATRY    Today's Date: 05/10/19    Patient Name: Mini Gentile  MRN: [id number]  [facility]: [id number]  PCP: Ivan Kelly MD  Referring Provider: No ref. provider found    SUBJECTIVE     Chief Complaint   Patient presents with   • Establish Care     diabetic foot and nail care - pt c/o long, thickened toenails and bliateral calluses - pt admits to not being able to see very well to do nail care at home   • Diabetes     last stated blood sugar 116 mg/dl - PCP Dr. Kelly last visit 19     HPI: Mini Gentile, a 59 y.o.female, comes to clinic as a(n) new patient presenting for diabetic foot exam and complaining of painful toenails. Patient has h/o DM2, Glaucoma, HTN, CKD on dialysis, Liver disorder, tobacco use. Also has difficulty seeing. Patient is IDDM with last stated BG level of 116mg/dl. Admits to occasional numbness and tingling in feet. Denies open wounds or sores. States that her toenails are long, thick, and discolored. Both of her great toenails feel ingrown. Denies redness or drainage. Admits pain at 4/10 level and described as burning, aching and sharp. Relates previous treatment(s) including foot care by podiatrist. Denies any constitutional symptoms. No other pedal complaints at this time.    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    • Chronic otitis media    • Diabetes (CMS/HCC)    • Eustachian tube dysfunction    • Glaucoma    • HTN (hypertension)    • Kidney failure     on dialysis   • Liver disorder    • Mucoid otitis media      Past Surgical History:   Procedure Laterality Date   • ARTERIOVENOUS FISTULA     • CATARACT EXTRACTION WITH INTRAOCULAR LENS IMPLANT     •  SECTION     • CHOLECYSTECTOMY     • MYRINGOTOMY W/ TUBES Bilateral    • MYRINGOTOMY W/ TUBES Right    • TUBAL ABDOMINAL LIGATION       Family History   Problem Relation Age of Onset   • Heart disease Mother      Social  History     Socioeconomic History   • Marital status:      Spouse name: Not on file   • Number of children: Not on file   • Years of education: Not on file   • Highest education level: Not on file   Tobacco Use   • Smoking status: Former Smoker     Packs/day: 1.00     Last attempt to quit: 10/27/2017     Years since quittin.5   • Smokeless tobacco: Never Used   Substance and Sexual Activity   • Alcohol use: No   • Drug use: No   • Sexual activity: Defer     Allergies   Allergen Reactions   • Bupropion Nausea Only   • Chantix [Varenicline]    • Sulfa Antibiotics    • Azithromycin Rash   • Penicillins Rash     Current Outpatient Medications   Medication Sig Dispense Refill   • aspirin 81 MG EC tablet Take 81 mg by mouth Daily.     • benzonatate (TESSALON) 100 MG capsule Take 1 capsule by mouth 3 (Three) Times a Day As Needed for Cough. 30 capsule 0   • bimatoprost (LUMIGAN) 0.01 % ophthalmic drops Administer 1 drop into the left eye Every Night.     • brimonidine (ALPHAGAN) 0.2 % ophthalmic solution Administer 1 drop into the left eye Daily.     • calcitriol (ROCALTROL) 0.25 MCG capsule Take 0.25 mcg by mouth Daily.     • calcium carbonate (TUMS) 500 MG chewable tablet Chew 1 tablet 3 (Three) Times a Day.     • Cetirizine HCl 10 MG capsule Take 10 mg by mouth.     • dorzolamide (TRUSOPT) 2 % ophthalmic solution Administer 1 drop into the left eye 2 (Two) Times a Day.     • fluticasone (FLONASE) 50 MCG/ACT nasal spray SHAKE LQ AND U 2 SPRAYS IEN D  3   • homatropine 5 % ophthalmic solution Administer 1 drop into the left eye Daily.     • insulin glargine (LANTUS) 100 UNIT/ML injection Inject 10 Units under the skin Every Night.     • lidocaine-prilocaine (EMLA) 2.5-2.5 % cream APPLY TO ACCESS AREA 30 MINUTES PRIOR TO HEMODIALYSIS THREE TIMES WEEKLY  0   • losartan (COZAAR) 100 MG tablet Take 100 mg by mouth Daily.     • midodrine (PROAMATINE) 5 MG tablet TK 1 T PO HALF WAY THROUGH TREATMENT AS DIRECTED  3    • pravastatin (PRAVACHOL) 10 MG tablet Take 10 mg by mouth Every Night.     • raNITIdine (ZANTAC) 150 MG tablet Take 150 mg by mouth 2 (Two) Times a Day.     • timolol (TIMOPTIC-XR) 0.5 % ophthalmic gel-forming Administer 1 drop into the left eye 3 (Three) Times a Day.       No current facility-administered medications for this visit.      Review of Systems   Constitutional: Negative for chills and fever.   HENT: Negative for congestion.    Respiratory: Negative for shortness of breath.    Cardiovascular: Negative for chest pain and leg swelling.   Gastrointestinal: Negative for constipation, diarrhea, nausea and vomiting.   Musculoskeletal:        Foot pain   Skin: Negative for wound.   Neurological: Positive for numbness.       OBJECTIVE     Vitals:    05/10/19 1347   BP: 110/60   Pulse: 81   SpO2: 96%       PHYSICAL EXAM  GEN:   Accompanied by none.     General Exam  Diabetic Foot Exam: performed  Appearance: appears stated age and healthy   Orientation: alert and oriented to person, place, and time   Affect: appropriate   Gait: unimpaired   Assistance: independent   Shoe Gear: casual shoes      Foot/Ankle Exam  Inspection and Palpation  Ecchymosis - Right: none Left: none  Tenderness - Right: (Toenails) Left: (Toenails)  Swelling - Right: none   Left: none    Arch - Right: normal Left: normal  Hammertoes - Right: absent Left: absent  Claw Toes - Right: absent Left: absent  Mallet Toes - Right: absent Left: absent  Hallux Valgus - Right: no Left: no  Hallux Limitus - Right: no Left: no  Skin - Right: skin intact  Left: skin intact   Nails -  Right: abnormally thick, dystrophic nails, ingrown toenail (hallux) and onychomycosis Left: abnormally thick, dystrophic nails, ingrown toenail (hallux) and onychomycosis     Vascular  Dorsalis Pedis - Right: 1+ Left: 1+  Posterior Tibial - Right: 2+ Left: 2+  Skin Temperature -  Right: warm  Left: warm    CFT - Right: 3 Left: 3  Pedal Hair Growth - Right: present Left:  present  Varicosities -  Right: no varicosities  Left: no varicosities      Neurologic  Saphenous Nerve Sensation  - Right: normal Left: normal  Tibial Nerve Sensation - Right: diminished Left: diminished  Superficial Peroneal Nerve Sensation - Right: diminished Left: diminished  Deep Peroneal Nerve Sensation - Right: diminished Left: diminished  Sural Nerve Sensation - Right: diminished Left: diminished  Protective Sensation using Aztec-Eddie Monofilament - Right: 9 Left: 9    Muscle Strength  Dorsiflexion - Right: 5 Left: 5  Plantar Flexion - Right: 5 Left: 5  Eversion - Right: 5 Left: 5  Inversion - Right: 5 Left: 5  Great Toe Extension - Right: 5 Left: 5  Great Toe Flexion - Right: 5 Left: 5    Range of Motion  Normal right ankle ROM  Normal left ankle ROM            RADIOLOGY/NUCLEAR:  No results found.    LABORATORY/CULTURE RESULTS:      PATHOLOGY RESULTS:       ASSESSMENT/PLAN     Mini was seen today for establish care and diabetes.    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  Discussed findings and treatment plan including risks, benefits, and treatment options with patient in detail. Patient agreed with treatment plan.  After verbal consent obtained, nail(s) x10 debrided of length and thickness with nail nipper without incidence  After verbal consent obtained, calluses x1 pared utilizing dermal curette and/or scalpel without incidence  Patient may maintain nails and calluses at home utilizing emery board or pumice stone between visits as needed  Reviewed at home diabetic foot care including daily foot checks   Discussed tobacco cessation   An After Visit Summary was printed and given to the patient at discharge, including (if requested) any available informative/educational  handouts regarding diagnosis, treatment, or medications. All questions were answered to patient/family satisfaction. Should symptoms fail to improve or worsen they agree to call or return to clinic or to go to the Emergency Department. Discussed the importance of following up with any needed screening tests/labs/specialist appointments and any requested follow-up recommended by me today. Importance of maintaining follow-up discussed and patient accepts that missed appointments can delay diagnosis and potentially lead to worsening of conditions.  Return in about 3 months (around 8/10/2019)., or sooner if acute issues arise.        This document has been electronically signed by Kenneth Holliday DPM on May 10, 2019 2:20 PM

## 2019-05-09 ENCOUNTER — TELEPHONE (OUTPATIENT)
Dept: PODIATRY | Facility: CLINIC | Age: 59
End: 2019-05-09

## 2019-05-09 NOTE — TELEPHONE ENCOUNTER
Called and reminded patient of appointment with Dr. Nathaniel Holliday on May 10, 2019 at 3:00 pm. Patient gave verbal confirmation of appointment at this time.

## 2019-05-10 ENCOUNTER — OFFICE VISIT (OUTPATIENT)
Dept: PODIATRY | Facility: CLINIC | Age: 59
End: 2019-05-10

## 2019-05-10 VITALS
OXYGEN SATURATION: 96 % | SYSTOLIC BLOOD PRESSURE: 110 MMHG | BODY MASS INDEX: 28.02 KG/M2 | HEIGHT: 59 IN | WEIGHT: 139 LBS | HEART RATE: 81 BPM | DIASTOLIC BLOOD PRESSURE: 60 MMHG

## 2019-05-10 DIAGNOSIS — L84 FOOT CALLUS: ICD-10-CM

## 2019-05-10 DIAGNOSIS — E11.49 DIABETES MELLITUS TYPE 2 WITH NEUROLOGICAL MANIFESTATIONS (HCC): ICD-10-CM

## 2019-05-10 DIAGNOSIS — Z72.0 TOBACCO ABUSE: ICD-10-CM

## 2019-05-10 DIAGNOSIS — E11.8 DIABETIC FOOT (HCC): ICD-10-CM

## 2019-05-10 DIAGNOSIS — B35.1 ONYCHOMYCOSIS: Primary | ICD-10-CM

## 2019-05-10 PROCEDURE — 11055 PARING/CUTG B9 HYPRKER LES 1: CPT | Performed by: PODIATRIST

## 2019-05-10 PROCEDURE — 11721 DEBRIDE NAIL 6 OR MORE: CPT | Performed by: PODIATRIST

## 2019-05-10 PROCEDURE — 99203 OFFICE O/P NEW LOW 30 MIN: CPT | Performed by: PODIATRIST

## 2019-05-10 NOTE — PATIENT INSTRUCTIONS
Diabetes Mellitus and Foot Care  Foot care is an important part of your health, especially when you have diabetes. Diabetes may cause you to have problems because of poor blood flow (circulation) to your feet and legs, which can cause your skin to:  · Become thinner and drier.  · Break more easily.  · Heal more slowly.  · Peel and crack.    You may also have nerve damage (neuropathy) in your legs and feet, causing decreased feeling in them. This means that you may not notice minor injuries to your feet that could lead to more serious problems. Noticing and addressing any potential problems early is the best way to prevent future foot problems.  How to care for your feet  Foot hygiene  · Wash your feet daily with warm water and mild soap. Do not use hot water. Then, pat your feet and the areas between your toes until they are completely dry. Do not soak your feet as this can dry your skin.  · Trim your toenails straight across. Do not dig under them or around the cuticle. File the edges of your nails with an emery board or nail file.  · Apply a moisturizing lotion or petroleum jelly to the skin on your feet and to dry, brittle toenails. Use lotion that does not contain alcohol and is unscented. Do not apply lotion between your toes.  Shoes and socks  · Wear clean socks or stockings every day. Make sure they are not too tight. Do not wear knee-high stockings since they may decrease blood flow to your legs.  · Wear shoes that fit properly and have enough cushioning. Always look in your shoes before you put them on to be sure there are no objects inside.  · To break in new shoes, wear them for just a few hours a day. This prevents injuries on your feet.  Wounds, scrapes, corns, and calluses  · Check your feet daily for blisters, cuts, bruises, sores, and redness. If you cannot see the bottom of your feet, use a mirror or ask someone for help.  · Do not cut corns or calluses or try to remove them with medicine.  · If you  find a minor scrape, cut, or break in the skin on your feet, keep it and the skin around it clean and dry. You may clean these areas with mild soap and water. Do not clean the area with peroxide, alcohol, or iodine.  · If you have a wound, scrape, corn, or callus on your foot, look at it several times a day to make sure it is healing and not infected. Check for:  ? Redness, swelling, or pain.  ? Fluid or blood.  ? Warmth.  ? Pus or a bad smell.  General instructions  · Do not cross your legs. This may decrease blood flow to your feet.  · Do not use heating pads or hot water bottles on your feet. They may burn your skin. If you have lost feeling in your feet or legs, you may not know this is happening until it is too late.  · Protect your feet from hot and cold by wearing shoes, such as at the beach or on hot pavement.  · Schedule a complete foot exam at least once a year (annually) or more often if you have foot problems. If you have foot problems, report any cuts, sores, or bruises to your health care provider immediately.  Contact a health care provider if:  · You have a medical condition that increases your risk of infection and you have any cuts, sores, or bruises on your feet.  · You have an injury that is not healing.  · You have redness on your legs or feet.  · You feel burning or tingling in your legs or feet.  · You have pain or cramps in your legs and feet.  · Your legs or feet are numb.  · Your feet always feel cold.  · You have pain around a toenail.  Get help right away if:  · You have a wound, scrape, corn, or callus on your foot and:  ? You have pain, swelling, or redness that gets worse.  ? You have fluid or blood coming from the wound, scrape, corn, or callus.  ? Your wound, scrape, corn, or callus feels warm to the touch.  ? You have pus or a bad smell coming from the wound, scrape, corn, or callus.  ? You have a fever.  ? You have a red line going up your leg.  Summary  · Check your feet every day  for cuts, sores, red spots, swelling, and blisters.  · Moisturize feet and legs daily.  · Wear shoes that fit properly and have enough cushioning.  · If you have foot problems, report any cuts, sores, or bruises to your health care provider immediately.  · Schedule a complete foot exam at least once a year (annually) or more often if you have foot problems.  This information is not intended to replace advice given to you by your health care provider. Make sure you discuss any questions you have with your health care provider.  Document Released: 12/15/2001 Document Revised: 01/19/2018 Document Reviewed: 01/19/2018  [employer] Interactive Patient Education © 2019 [employer]    Steps to Quit Smoking  Smoking tobacco can be harmful to your health and can affect almost every organ in your body. Smoking puts you, and those around you, at risk for developing many serious chronic diseases. Quitting smoking is difficult, but it is one of the best things that you can do for your health. It is never too late to quit.  What are the benefits of quitting smoking?  When you quit smoking, you lower your risk of developing serious diseases and conditions, such as:  · Lung cancer or lung disease, such as COPD.  · Heart disease.  · Stroke.  · Heart attack.  · Infertility.  · Osteoporosis and bone fractures.    Additionally, symptoms such as coughing, wheezing, and shortness of breath may get better when you quit. You may also find that you get sick less often because your body is stronger at fighting off colds and infections. If you are pregnant, quitting smoking can help to reduce your chances of having a baby of low birth weight.  How do I get ready to quit?  When you decide to quit smoking, create a plan to make sure that you are successful. Before you quit:  · Pick a date to quit. Set a date within the next two weeks to give you time to prepare.  · Write down the reasons why you are quitting. Keep this list in places where you  will see it often, such as on your bathroom mirror or in your car or wallet.  · Identify the people, places, things, and activities that make you want to smoke (triggers) and avoid them. Make sure to take these actions:  ? Throw away all cigarettes at home, at work, and in your car.  ? Throw away smoking accessories, such as ashtrays and lighters.  ? Clean your car and make sure to empty the ashtray.  ? Clean your home, including curtains and carpets.  · Tell your family, friends, and coworkers that you are quitting. Support from your loved ones can make quitting easier.  · Talk with your health care provider about your options for quitting smoking.  · Find out what treatment options are covered by your health insurance.    What strategies can I use to quit smoking?  Talk with your healthcare provider about different strategies to quit smoking. Some strategies include:  · Quitting smoking altogether instead of gradually lessening how much you smoke over a period of time. Research shows that quitting “cold turkey” is more successful than gradually quitting.  · Attending in-person counseling to help you build problem-solving skills. You are more likely to have success in quitting if you attend several counseling sessions. Even short sessions of 10 minutes can be effective.  · Finding resources and support systems that can help you to quit smoking and remain smoke-free after you quit. These resources are most helpful when you use them often. They can include:  ? Online chats with a counselor.  ? Telephone quitlines.  ? Printed self-help materials.  ? Support groups or group counseling.  ? Text messaging programs.  ? Mobile phone applications.  · Taking medicines to help you quit smoking. (If you are pregnant or breastfeeding, talk with your health care provider first.) Some medicines contain nicotine and some do not. Both types of medicines help with cravings, but the medicines that include nicotine help to relieve  withdrawal symptoms. Your health care provider may recommend:  ? Nicotine patches, gum, or lozenges.  ? Nicotine inhalers or sprays.  ? Non-nicotine medicine that is taken by mouth.    Talk with your health care provider about combining strategies, such as taking medicines while you are also receiving in-person counseling. Using these two strategies together makes you more likely to succeed in quitting than if you used either strategy on its own.  If you are pregnant or breastfeeding, talk with your health care provider about finding counseling or other support strategies to quit smoking. Do not take medicine to help you quit smoking unless told to do so by your health care provider.  What things can I do to make it easier to quit?  Quitting smoking might feel overwhelming at first, but there is a lot that you can do to make it easier. Take these important actions:  · Reach out to your family and friends and ask that they support and encourage you during this time. Call telephone quitlines, reach out to support groups, or work with a counselor for support.  · Ask people who smoke to avoid smoking around you.  · Avoid places that trigger you to smoke, such as bars, parties, or smoke-break areas at work.  · Spend time around people who do not smoke.  · Lessen stress in your life, because stress can be a smoking trigger for some people. To lessen stress, try:  ? Exercising regularly.  ? Deep-breathing exercises.  ? Yoga.  ? Meditating.  ? Performing a body scan. This involves closing your eyes, scanning your body from head to toe, and noticing which parts of your body are particularly tense. Purposefully relax the muscles in those areas.  · Download or purchase mobile phone or tablet apps (applications) that can help you stick to your quit plan by providing reminders, tips, and encouragement. There are many free apps, such as QuitGuide from the CDC (Centers for Disease Control and Prevention). You can find other  support for quitting smoking (smoking cessation) through smokefree.gov and other websites.    How will I feel when I quit smoking?  Within the first 24 hours of quitting smoking, you may start to feel some withdrawal symptoms. These symptoms are usually most noticeable 2-3 days after quitting, but they usually do not last beyond 2-3 weeks. Changes or symptoms that you might experience include:  · Mood swings.  · Restlessness, anxiety, or irritation.  · Difficulty concentrating.  · Dizziness.  · Strong cravings for sugary foods in addition to nicotine.  · Mild weight gain.  · Constipation.  · Nausea.  · Coughing or a sore throat.  · Changes in how your medicines work in your body.  · A depressed mood.  · Difficulty sleeping (insomnia).    After the first 2-3 weeks of quitting, you may start to notice more positive results, such as:  · Improved sense of smell and taste.  · Decreased coughing and sore throat.  · Slower heart rate.  · Lower blood pressure.  · Clearer skin.  · The ability to breathe more easily.  · Fewer sick days.    Quitting smoking is very challenging for most people. Do not get discouraged if you are not successful the first time. Some people need to make many attempts to quit before they achieve long-term success. Do your best to stick to your quit plan, and talk with your health care provider if you have any questions or concerns.  This information is not intended to replace advice given to you by your health care provider. Make sure you discuss any questions you have with your health care provider.  Document Released: 12/12/2002 Document Revised: 07/24/2018 Document Reviewed: 05/03/2016  [employer] Interactive Patient Education © 2019 [employer]

## 2019-05-31 DIAGNOSIS — E78.2 MIXED HYPERLIPIDEMIA: ICD-10-CM

## 2019-05-31 DIAGNOSIS — K21.9 GASTROESOPHAGEAL REFLUX DISEASE WITHOUT ESOPHAGITIS: ICD-10-CM

## 2019-05-31 RX ORDER — FAMOTIDINE 20 MG/1
TABLET, FILM COATED ORAL
Qty: 60 TABLET | Refills: 0 | Status: SHIPPED | OUTPATIENT
Start: 2019-05-31 | End: 2019-07-02 | Stop reason: SDUPTHER

## 2019-05-31 RX ORDER — PRAVASTATIN SODIUM 10 MG
TABLET ORAL
Qty: 30 TABLET | Refills: 0 | Status: SHIPPED | OUTPATIENT
Start: 2019-05-31 | End: 2019-07-02 | Stop reason: SDUPTHER

## 2019-08-09 LAB
ALBUMIN SERPL-MCNC: 4.1 G/DL (ref 3.5–5.2)
ALP BLD-CCNC: 73 U/L (ref 35–104)
ALT SERPL-CCNC: 8 U/L (ref 5–33)
ANION GAP SERPL CALCULATED.3IONS-SCNC: 16 MMOL/L (ref 7–19)
AST SERPL-CCNC: 14 U/L (ref 5–32)
BASOPHILS ABSOLUTE: 0 K/UL (ref 0–0.2)
BASOPHILS RELATIVE PERCENT: 0.6 % (ref 0–1)
BILIRUB SERPL-MCNC: 0.3 MG/DL (ref 0.2–1.2)
BUN BLDV-MCNC: 64 MG/DL (ref 8–23)
CALCIUM SERPL-MCNC: 9.1 MG/DL (ref 8.8–10.2)
CHLORIDE BLD-SCNC: 104 MMOL/L (ref 98–111)
CO2: 23 MMOL/L (ref 22–29)
CREAT SERPL-MCNC: 6.6 MG/DL (ref 0.5–0.9)
EOSINOPHILS ABSOLUTE: 0.8 K/UL (ref 0–0.6)
EOSINOPHILS RELATIVE PERCENT: 13 % (ref 0–5)
GFR NON-AFRICAN AMERICAN: 6
GLUCOSE BLD-MCNC: 99 MG/DL (ref 74–109)
HCT VFR BLD CALC: 41.6 % (ref 37–47)
HEMOGLOBIN: 13.2 G/DL (ref 12–16)
LYMPHOCYTES ABSOLUTE: 1.9 K/UL (ref 1.1–4.5)
LYMPHOCYTES RELATIVE PERCENT: 30 % (ref 20–40)
MCH RBC QN AUTO: 32.3 PG (ref 27–31)
MCHC RBC AUTO-ENTMCNC: 31.7 G/DL (ref 33–37)
MCV RBC AUTO: 101.7 FL (ref 81–99)
MONOCYTES ABSOLUTE: 0.3 K/UL (ref 0–0.9)
MONOCYTES RELATIVE PERCENT: 5.1 % (ref 0–10)
NEUTROPHILS ABSOLUTE: 3.3 K/UL (ref 1.5–7.5)
NEUTROPHILS RELATIVE PERCENT: 50.8 % (ref 50–65)
PDW BLD-RTO: 14.6 % (ref 11.5–14.5)
PLATELET # BLD: 268 K/UL (ref 130–400)
PMV BLD AUTO: 9 FL (ref 9.4–12.3)
POTASSIUM SERPL-SCNC: 5.2 MMOL/L (ref 3.5–5)
RBC # BLD: 4.09 M/UL (ref 4.2–5.4)
SODIUM BLD-SCNC: 143 MMOL/L (ref 136–145)
TOTAL PROTEIN: 7.3 G/DL (ref 6.6–8.7)
WBC # BLD: 6.5 K/UL (ref 4.8–10.8)

## 2019-09-03 ENCOUNTER — HOSPITAL ENCOUNTER (INPATIENT)
Facility: HOSPITAL | Age: 59
LOS: 2 days | Discharge: HOME | End: 2019-09-05
Attending: INTERNAL MEDICINE | Admitting: FAMILY MEDICINE

## 2019-09-03 ENCOUNTER — APPOINTMENT (OUTPATIENT)
Dept: GENERAL RADIOLOGY | Facility: HOSPITAL | Age: 59
End: 2019-09-03

## 2019-09-03 ENCOUNTER — APPOINTMENT (OUTPATIENT)
Dept: CT IMAGING | Facility: HOSPITAL | Age: 59
End: 2019-09-03

## 2019-09-03 DIAGNOSIS — N18.9 CHRONIC KIDNEY DISEASE, UNSPECIFIED CKD STAGE: ICD-10-CM

## 2019-09-03 DIAGNOSIS — Z74.09 IMPAIRED MOBILITY: ICD-10-CM

## 2019-09-03 DIAGNOSIS — J18.9 PNEUMONIA DUE TO INFECTIOUS ORGANISM, UNSPECIFIED LATERALITY, UNSPECIFIED PART OF LUNG: Primary | ICD-10-CM

## 2019-09-03 PROBLEM — N18.6 END STAGE RENAL FAILURE ON DIALYSIS (HCC): Status: ACTIVE | Noted: 2018-02-21

## 2019-09-03 PROBLEM — Z99.2 END STAGE RENAL FAILURE ON DIALYSIS (HCC): Status: ACTIVE | Noted: 2018-02-21

## 2019-09-03 LAB
ALBUMIN SERPL-MCNC: 4.6 G/DL (ref 3.5–5)
ALBUMIN/GLOB SERPL: 1.4 G/DL (ref 1.1–2.5)
ALP SERPL-CCNC: 74 U/L (ref 24–120)
ALT SERPL W P-5'-P-CCNC: 20 U/L (ref 0–54)
ANION GAP SERPL CALCULATED.3IONS-SCNC: 20 MMOL/L (ref 4–13)
APTT PPP: 31.5 SECONDS (ref 24.1–35)
AST SERPL-CCNC: 32 U/L (ref 7–45)
BASOPHILS # BLD AUTO: 0.02 10*3/MM3 (ref 0–0.2)
BASOPHILS NFR BLD AUTO: 0.3 % (ref 0–1.5)
BILIRUB SERPL-MCNC: 1 MG/DL (ref 0.1–1)
BUN BLD-MCNC: 45 MG/DL (ref 5–21)
BUN/CREAT SERPL: 6 (ref 7–25)
CALCIUM SPEC-SCNC: 9.3 MG/DL (ref 8.4–10.4)
CHLORIDE SERPL-SCNC: 98 MMOL/L (ref 98–110)
CO2 SERPL-SCNC: 25 MMOL/L (ref 24–31)
CREAT BLD-MCNC: 7.55 MG/DL (ref 0.5–1.4)
D-LACTATE SERPL-SCNC: 1.3 MMOL/L (ref 0.5–2)
DEPRECATED RDW RBC AUTO: 50.7 FL (ref 37–54)
EOSINOPHIL # BLD AUTO: 0.01 10*3/MM3 (ref 0–0.4)
EOSINOPHIL NFR BLD AUTO: 0.2 % (ref 0.3–6.2)
ERYTHROCYTE [DISTWIDTH] IN BLOOD BY AUTOMATED COUNT: 14.6 % (ref 12.3–15.4)
GFR SERPL CREATININE-BSD FRML MDRD: 5 ML/MIN/1.73
GFR SERPL CREATININE-BSD FRML MDRD: ABNORMAL ML/MIN/{1.73_M2}
GLOBULIN UR ELPH-MCNC: 3.2 GM/DL
GLUCOSE BLD-MCNC: 109 MG/DL (ref 70–100)
HCT VFR BLD AUTO: 39.6 % (ref 34–46.6)
HGB BLD-MCNC: 13.3 G/DL (ref 12–15.9)
IMM GRANULOCYTES # BLD AUTO: 0.02 10*3/MM3 (ref 0–0.05)
IMM GRANULOCYTES NFR BLD AUTO: 0.3 % (ref 0–0.5)
INR PPP: 0.95 (ref 0.91–1.09)
LIPASE SERPL-CCNC: 194 U/L (ref 23–203)
LYMPHOCYTES # BLD AUTO: 1.28 10*3/MM3 (ref 0.7–3.1)
LYMPHOCYTES NFR BLD AUTO: 20.1 % (ref 19.6–45.3)
MAGNESIUM SERPL-MCNC: 2.3 MG/DL (ref 1.4–2.2)
MCH RBC QN AUTO: 31.7 PG (ref 26.6–33)
MCHC RBC AUTO-ENTMCNC: 33.6 G/DL (ref 31.5–35.7)
MCV RBC AUTO: 94.3 FL (ref 79–97)
MONOCYTES # BLD AUTO: 0.3 10*3/MM3 (ref 0.1–0.9)
MONOCYTES NFR BLD AUTO: 4.7 % (ref 5–12)
NEUTROPHILS # BLD AUTO: 4.74 10*3/MM3 (ref 1.7–7)
NEUTROPHILS NFR BLD AUTO: 74.4 % (ref 42.7–76)
NRBC BLD AUTO-RTO: 0 /100 WBC (ref 0–0.2)
NT-PROBNP SERPL-MCNC: 3360 PG/ML (ref 0–900)
PLATELET # BLD AUTO: 187 10*3/MM3 (ref 140–450)
PMV BLD AUTO: 8.8 FL (ref 6–12)
POTASSIUM BLD-SCNC: 3.9 MMOL/L (ref 3.5–5.3)
PROT SERPL-MCNC: 7.8 G/DL (ref 6.3–8.7)
PROTHROMBIN TIME: 13 SECONDS (ref 11.9–14.6)
RBC # BLD AUTO: 4.2 10*6/MM3 (ref 3.77–5.28)
SODIUM BLD-SCNC: 143 MMOL/L (ref 135–145)
TROPONIN I SERPL-MCNC: 0.03 NG/ML (ref 0–0.03)
TSH SERPL DL<=0.05 MIU/L-ACNC: 1.41 UIU/ML (ref 0.47–4.68)
WBC NRBC COR # BLD: 6.37 10*3/MM3 (ref 3.4–10.8)

## 2019-09-03 PROCEDURE — 99284 EMERGENCY DEPT VISIT MOD MDM: CPT

## 2019-09-03 PROCEDURE — 84484 ASSAY OF TROPONIN QUANT: CPT | Performed by: PHYSICIAN ASSISTANT

## 2019-09-03 PROCEDURE — 85610 PROTHROMBIN TIME: CPT | Performed by: PHYSICIAN ASSISTANT

## 2019-09-03 PROCEDURE — 87040 BLOOD CULTURE FOR BACTERIA: CPT | Performed by: PHYSICIAN ASSISTANT

## 2019-09-03 PROCEDURE — 94640 AIRWAY INHALATION TREATMENT: CPT

## 2019-09-03 PROCEDURE — 83690 ASSAY OF LIPASE: CPT | Performed by: PHYSICIAN ASSISTANT

## 2019-09-03 PROCEDURE — 84100 ASSAY OF PHOSPHORUS: CPT | Performed by: INTERNAL MEDICINE

## 2019-09-03 PROCEDURE — 80053 COMPREHEN METABOLIC PANEL: CPT | Performed by: PHYSICIAN ASSISTANT

## 2019-09-03 PROCEDURE — 83735 ASSAY OF MAGNESIUM: CPT | Performed by: PHYSICIAN ASSISTANT

## 2019-09-03 PROCEDURE — 93010 ELECTROCARDIOGRAM REPORT: CPT | Performed by: INTERNAL MEDICINE

## 2019-09-03 PROCEDURE — 83605 ASSAY OF LACTIC ACID: CPT | Performed by: PHYSICIAN ASSISTANT

## 2019-09-03 PROCEDURE — 84443 ASSAY THYROID STIM HORMONE: CPT | Performed by: PHYSICIAN ASSISTANT

## 2019-09-03 PROCEDURE — 85025 COMPLETE CBC W/AUTO DIFF WBC: CPT | Performed by: PHYSICIAN ASSISTANT

## 2019-09-03 PROCEDURE — 25010000002 ONDANSETRON PER 1 MG: Performed by: PHYSICIAN ASSISTANT

## 2019-09-03 PROCEDURE — 93005 ELECTROCARDIOGRAM TRACING: CPT | Performed by: PHYSICIAN ASSISTANT

## 2019-09-03 PROCEDURE — 74176 CT ABD & PELVIS W/O CONTRAST: CPT

## 2019-09-03 PROCEDURE — 25010000002 METHYLPREDNISOLONE PER 125 MG: Performed by: PHYSICIAN ASSISTANT

## 2019-09-03 PROCEDURE — 25010000002 CEFTRIAXONE PER 250 MG: Performed by: PHYSICIAN ASSISTANT

## 2019-09-03 PROCEDURE — 83880 ASSAY OF NATRIURETIC PEPTIDE: CPT | Performed by: PHYSICIAN ASSISTANT

## 2019-09-03 PROCEDURE — 85730 THROMBOPLASTIN TIME PARTIAL: CPT | Performed by: PHYSICIAN ASSISTANT

## 2019-09-03 PROCEDURE — 71045 X-RAY EXAM CHEST 1 VIEW: CPT

## 2019-09-03 RX ORDER — LEVOFLOXACIN 5 MG/ML
750 INJECTION, SOLUTION INTRAVENOUS ONCE
Status: DISCONTINUED | OUTPATIENT
Start: 2019-09-03 | End: 2019-09-03

## 2019-09-03 RX ORDER — METHYLPREDNISOLONE SODIUM SUCCINATE 125 MG/2ML
125 INJECTION, POWDER, LYOPHILIZED, FOR SOLUTION INTRAMUSCULAR; INTRAVENOUS ONCE
Status: COMPLETED | OUTPATIENT
Start: 2019-09-03 | End: 2019-09-03

## 2019-09-03 RX ORDER — IPRATROPIUM BROMIDE AND ALBUTEROL SULFATE 2.5; .5 MG/3ML; MG/3ML
3 SOLUTION RESPIRATORY (INHALATION) ONCE
Status: COMPLETED | OUTPATIENT
Start: 2019-09-03 | End: 2019-09-03

## 2019-09-03 RX ORDER — ONDANSETRON 2 MG/ML
4 INJECTION INTRAMUSCULAR; INTRAVENOUS ONCE
Status: COMPLETED | OUTPATIENT
Start: 2019-09-03 | End: 2019-09-03

## 2019-09-03 RX ADMIN — CEFTRIAXONE SODIUM 1 G: 1 INJECTION, POWDER, FOR SOLUTION INTRAMUSCULAR; INTRAVENOUS at 22:40

## 2019-09-03 RX ADMIN — ONDANSETRON HYDROCHLORIDE 4 MG: 2 SOLUTION INTRAMUSCULAR; INTRAVENOUS at 22:15

## 2019-09-03 RX ADMIN — METHYLPREDNISOLONE SODIUM SUCCINATE 125 MG: 125 INJECTION, POWDER, FOR SOLUTION INTRAMUSCULAR; INTRAVENOUS at 22:16

## 2019-09-03 RX ADMIN — IPRATROPIUM BROMIDE AND ALBUTEROL SULFATE 3 ML: 2.5; .5 SOLUTION RESPIRATORY (INHALATION) at 22:28

## 2019-09-04 PROBLEM — Z79.4 TYPE 2 DIABETES MELLITUS, WITH LONG-TERM CURRENT USE OF INSULIN (HCC): Status: ACTIVE | Noted: 2019-09-04

## 2019-09-04 PROBLEM — J15.3 PNEUMONIA DUE TO GROUP B STREPTOCOCCUS (HCC): Status: ACTIVE | Noted: 2019-09-03

## 2019-09-04 PROBLEM — E11.9 DIABETES (HCC): Status: ACTIVE | Noted: 2019-09-04

## 2019-09-04 LAB
ALBUMIN SERPL-MCNC: 4.2 G/DL (ref 3.5–5)
ALBUMIN/GLOB SERPL: 1.5 G/DL (ref 1.1–2.5)
ALP SERPL-CCNC: 70 U/L (ref 24–120)
ALT SERPL W P-5'-P-CCNC: 18 U/L (ref 0–54)
ANION GAP SERPL CALCULATED.3IONS-SCNC: 19 MMOL/L (ref 4–13)
AST SERPL-CCNC: 36 U/L (ref 7–45)
BACTERIA UR QL AUTO: ABNORMAL /HPF
BASOPHILS # BLD AUTO: 0.01 10*3/MM3 (ref 0–0.2)
BASOPHILS NFR BLD AUTO: 0.1 % (ref 0–1.5)
BILIRUB SERPL-MCNC: 0.9 MG/DL (ref 0.1–1)
BILIRUB UR QL STRIP: NEGATIVE
BUN BLD-MCNC: 49 MG/DL (ref 5–21)
BUN/CREAT SERPL: 6.5 (ref 7–25)
CALCIUM SPEC-SCNC: 8.9 MG/DL (ref 8.4–10.4)
CHLORIDE SERPL-SCNC: 100 MMOL/L (ref 98–110)
CLARITY UR: CLEAR
CO2 SERPL-SCNC: 21 MMOL/L (ref 24–31)
COLOR UR: YELLOW
CREAT BLD-MCNC: 7.54 MG/DL (ref 0.5–1.4)
DEPRECATED RDW RBC AUTO: 51 FL (ref 37–54)
EOSINOPHIL # BLD AUTO: 0 10*3/MM3 (ref 0–0.4)
EOSINOPHIL NFR BLD AUTO: 0 % (ref 0.3–6.2)
ERYTHROCYTE [DISTWIDTH] IN BLOOD BY AUTOMATED COUNT: 14.5 % (ref 12.3–15.4)
GFR SERPL CREATININE-BSD FRML MDRD: 5 ML/MIN/1.73
GFR SERPL CREATININE-BSD FRML MDRD: ABNORMAL ML/MIN/{1.73_M2}
GLOBULIN UR ELPH-MCNC: 2.8 GM/DL
GLUCOSE BLD-MCNC: 226 MG/DL (ref 70–100)
GLUCOSE BLDC GLUCOMTR-MCNC: 135 MG/DL (ref 70–130)
GLUCOSE BLDC GLUCOMTR-MCNC: 170 MG/DL (ref 70–130)
GLUCOSE BLDC GLUCOMTR-MCNC: 229 MG/DL (ref 70–130)
GLUCOSE BLDC GLUCOMTR-MCNC: 254 MG/DL (ref 70–130)
GLUCOSE BLDC GLUCOMTR-MCNC: 267 MG/DL (ref 70–130)
GLUCOSE UR STRIP-MCNC: ABNORMAL MG/DL
HBA1C MFR BLD: 6.3 % (ref 4.8–5.9)
HBV SURFACE AG SERPL QL IA: NEGATIVE
HCT VFR BLD AUTO: 37.8 % (ref 34–46.6)
HGB BLD-MCNC: 12.5 G/DL (ref 12–15.9)
HGB UR QL STRIP.AUTO: ABNORMAL
HYALINE CASTS UR QL AUTO: ABNORMAL /LPF
IMM GRANULOCYTES # BLD AUTO: 0.02 10*3/MM3 (ref 0–0.05)
IMM GRANULOCYTES NFR BLD AUTO: 0.3 % (ref 0–0.5)
KETONES UR QL STRIP: ABNORMAL
L PNEUMO1 AG UR QL IA: NEGATIVE
LEUKOCYTE ESTERASE UR QL STRIP.AUTO: NEGATIVE
LYMPHOCYTES # BLD AUTO: 0.39 10*3/MM3 (ref 0.7–3.1)
LYMPHOCYTES NFR BLD AUTO: 5.4 % (ref 19.6–45.3)
MAGNESIUM SERPL-MCNC: 2.2 MG/DL (ref 1.4–2.2)
MCH RBC QN AUTO: 31.7 PG (ref 26.6–33)
MCHC RBC AUTO-ENTMCNC: 33.1 G/DL (ref 31.5–35.7)
MCV RBC AUTO: 95.9 FL (ref 79–97)
MONOCYTES # BLD AUTO: 0.08 10*3/MM3 (ref 0.1–0.9)
MONOCYTES NFR BLD AUTO: 1.1 % (ref 5–12)
NEUTROPHILS # BLD AUTO: 6.68 10*3/MM3 (ref 1.7–7)
NEUTROPHILS NFR BLD AUTO: 93.1 % (ref 42.7–76)
NITRITE UR QL STRIP: NEGATIVE
NRBC BLD AUTO-RTO: 0 /100 WBC (ref 0–0.2)
PH UR STRIP.AUTO: 6.5 [PH] (ref 5–8)
PHOSPHATE SERPL-MCNC: 5.9 MG/DL (ref 2.5–4.5)
PHOSPHATE SERPL-MCNC: 6 MG/DL (ref 2.5–4.5)
PLATELET # BLD AUTO: 197 10*3/MM3 (ref 140–450)
PMV BLD AUTO: 9.4 FL (ref 6–12)
POTASSIUM BLD-SCNC: 3.9 MMOL/L (ref 3.5–5.3)
PROT SERPL-MCNC: 7 G/DL (ref 6.3–8.7)
PROT UR QL STRIP: ABNORMAL
RBC # BLD AUTO: 3.94 10*6/MM3 (ref 3.77–5.28)
RBC # UR: ABNORMAL /HPF
REF LAB TEST METHOD: ABNORMAL
S PNEUM AG SPEC QL LA: POSITIVE
SODIUM BLD-SCNC: 140 MMOL/L (ref 135–145)
SP GR UR STRIP: 1.02 (ref 1–1.03)
SQUAMOUS #/AREA URNS HPF: ABNORMAL /HPF
T4 FREE SERPL-MCNC: 0.87 NG/DL (ref 0.78–2.19)
UROBILINOGEN UR QL STRIP: ABNORMAL
WBC NRBC COR # BLD: 7.18 10*3/MM3 (ref 3.4–10.8)
WBC UR QL AUTO: ABNORMAL /HPF

## 2019-09-04 PROCEDURE — 87070 CULTURE OTHR SPECIMN AEROBIC: CPT | Performed by: INTERNAL MEDICINE

## 2019-09-04 PROCEDURE — 25010000002 CEFTRIAXONE PER 250 MG: Performed by: INTERNAL MEDICINE

## 2019-09-04 PROCEDURE — 80053 COMPREHEN METABOLIC PANEL: CPT | Performed by: INTERNAL MEDICINE

## 2019-09-04 PROCEDURE — 87184 SC STD DISK METHOD PER PLATE: CPT | Performed by: INTERNAL MEDICINE

## 2019-09-04 PROCEDURE — 87077 CULTURE AEROBIC IDENTIFY: CPT | Performed by: INTERNAL MEDICINE

## 2019-09-04 PROCEDURE — 87899 AGENT NOS ASSAY W/OPTIC: CPT | Performed by: INTERNAL MEDICINE

## 2019-09-04 PROCEDURE — 63710000001 INSULIN LISPRO (HUMAN) PER 5 UNITS: Performed by: INTERNAL MEDICINE

## 2019-09-04 PROCEDURE — 94799 UNLISTED PULMONARY SVC/PX: CPT

## 2019-09-04 PROCEDURE — 82962 GLUCOSE BLOOD TEST: CPT

## 2019-09-04 PROCEDURE — 87205 SMEAR GRAM STAIN: CPT | Performed by: INTERNAL MEDICINE

## 2019-09-04 PROCEDURE — 83735 ASSAY OF MAGNESIUM: CPT | Performed by: INTERNAL MEDICINE

## 2019-09-04 PROCEDURE — 63710000001 INSULIN DETEMIR PER 5 UNITS: Performed by: INTERNAL MEDICINE

## 2019-09-04 PROCEDURE — 25010000002 ENOXAPARIN PER 10 MG: Performed by: INTERNAL MEDICINE

## 2019-09-04 PROCEDURE — 5A1D70Z PERFORMANCE OF URINARY FILTRATION, INTERMITTENT, LESS THAN 6 HOURS PER DAY: ICD-10-PCS | Performed by: NURSE PRACTITIONER

## 2019-09-04 PROCEDURE — 87186 SC STD MICRODIL/AGAR DIL: CPT | Performed by: INTERNAL MEDICINE

## 2019-09-04 PROCEDURE — 84439 ASSAY OF FREE THYROXINE: CPT | Performed by: INTERNAL MEDICINE

## 2019-09-04 PROCEDURE — 81001 URINALYSIS AUTO W/SCOPE: CPT | Performed by: PHYSICIAN ASSISTANT

## 2019-09-04 PROCEDURE — 85025 COMPLETE CBC W/AUTO DIFF WBC: CPT | Performed by: INTERNAL MEDICINE

## 2019-09-04 PROCEDURE — 84100 ASSAY OF PHOSPHORUS: CPT | Performed by: INTERNAL MEDICINE

## 2019-09-04 PROCEDURE — 94760 N-INVAS EAR/PLS OXIMETRY 1: CPT

## 2019-09-04 PROCEDURE — 87340 HEPATITIS B SURFACE AG IA: CPT | Performed by: INTERNAL MEDICINE

## 2019-09-04 PROCEDURE — 87185 SC STD ENZYME DETCJ PER NZM: CPT | Performed by: INTERNAL MEDICINE

## 2019-09-04 PROCEDURE — 25010000002 METHYLPREDNISOLONE PER 40 MG: Performed by: INTERNAL MEDICINE

## 2019-09-04 PROCEDURE — 83036 HEMOGLOBIN GLYCOSYLATED A1C: CPT | Performed by: INTERNAL MEDICINE

## 2019-09-04 RX ORDER — LOSARTAN POTASSIUM 50 MG/1
100 TABLET ORAL DAILY
Status: DISCONTINUED | OUTPATIENT
Start: 2019-09-04 | End: 2019-09-05 | Stop reason: HOSPADM

## 2019-09-04 RX ORDER — FAMOTIDINE 20 MG/1
20 TABLET, FILM COATED ORAL DAILY
Status: DISCONTINUED | OUTPATIENT
Start: 2019-09-04 | End: 2019-09-05 | Stop reason: HOSPADM

## 2019-09-04 RX ORDER — CALCITRIOL 0.25 UG/1
0.25 CAPSULE, LIQUID FILLED ORAL DAILY
Status: DISCONTINUED | OUTPATIENT
Start: 2019-09-04 | End: 2019-09-05 | Stop reason: HOSPADM

## 2019-09-04 RX ORDER — ACETAMINOPHEN 325 MG/1
650 TABLET ORAL EVERY 4 HOURS PRN
Status: DISCONTINUED | OUTPATIENT
Start: 2019-09-04 | End: 2019-09-05 | Stop reason: HOSPADM

## 2019-09-04 RX ORDER — LIDOCAINE AND PRILOCAINE 25; 25 MG/G; MG/G
CREAM TOPICAL AS NEEDED
Status: DISCONTINUED | OUTPATIENT
Start: 2019-09-04 | End: 2019-09-05 | Stop reason: HOSPADM

## 2019-09-04 RX ORDER — BENZONATATE 100 MG/1
100 CAPSULE ORAL 3 TIMES DAILY PRN
Status: DISCONTINUED | OUTPATIENT
Start: 2019-09-04 | End: 2019-09-05 | Stop reason: HOSPADM

## 2019-09-04 RX ORDER — CETIRIZINE HYDROCHLORIDE 10 MG/1
10 TABLET ORAL DAILY
Status: DISCONTINUED | OUTPATIENT
Start: 2019-09-04 | End: 2019-09-05 | Stop reason: HOSPADM

## 2019-09-04 RX ORDER — TIMOLOL MALEATE 5 MG/ML
1 SOLUTION/ DROPS OPHTHALMIC 3 TIMES DAILY
Status: DISCONTINUED | OUTPATIENT
Start: 2019-09-04 | End: 2019-09-05 | Stop reason: HOSPADM

## 2019-09-04 RX ORDER — NICOTINE POLACRILEX 4 MG
15 LOZENGE BUCCAL
Status: DISCONTINUED | OUTPATIENT
Start: 2019-09-04 | End: 2019-09-05 | Stop reason: HOSPADM

## 2019-09-04 RX ORDER — ONDANSETRON 2 MG/ML
4 INJECTION INTRAMUSCULAR; INTRAVENOUS EVERY 6 HOURS PRN
Status: DISCONTINUED | OUTPATIENT
Start: 2019-09-04 | End: 2019-09-05 | Stop reason: HOSPADM

## 2019-09-04 RX ORDER — FLUTICASONE PROPIONATE 50 MCG
2 SPRAY, SUSPENSION (ML) NASAL 2 TIMES DAILY
COMMUNITY

## 2019-09-04 RX ORDER — ACETAMINOPHEN 500 MG
1000 TABLET ORAL ONCE
Status: COMPLETED | OUTPATIENT
Start: 2019-09-04 | End: 2019-09-04

## 2019-09-04 RX ORDER — NICOTINE 21 MG/24HR
1 PATCH, TRANSDERMAL 24 HOURS TRANSDERMAL EVERY 24 HOURS
Status: DISCONTINUED | OUTPATIENT
Start: 2019-09-04 | End: 2019-09-05 | Stop reason: HOSPADM

## 2019-09-04 RX ORDER — TIMOLOL MALEATE 5 MG/ML
1 SOLUTION/ DROPS OPHTHALMIC EVERY MORNING
Status: ON HOLD | COMMUNITY
End: 2021-01-01

## 2019-09-04 RX ORDER — PRAVASTATIN SODIUM 20 MG
10 TABLET ORAL NIGHTLY
Status: DISCONTINUED | OUTPATIENT
Start: 2019-09-04 | End: 2019-09-05 | Stop reason: HOSPADM

## 2019-09-04 RX ORDER — CALCIUM CARBONATE 500(1250)
500 TABLET ORAL 3 TIMES DAILY
Status: DISCONTINUED | OUTPATIENT
Start: 2019-09-04 | End: 2019-09-04 | Stop reason: ALTCHOICE

## 2019-09-04 RX ORDER — DEXTROSE MONOHYDRATE 25 G/50ML
25 INJECTION, SOLUTION INTRAVENOUS
Status: DISCONTINUED | OUTPATIENT
Start: 2019-09-04 | End: 2019-09-05 | Stop reason: HOSPADM

## 2019-09-04 RX ORDER — IPRATROPIUM BROMIDE AND ALBUTEROL SULFATE 2.5; .5 MG/3ML; MG/3ML
3 SOLUTION RESPIRATORY (INHALATION) EVERY 4 HOURS PRN
Status: DISCONTINUED | OUTPATIENT
Start: 2019-09-04 | End: 2019-09-05 | Stop reason: HOSPADM

## 2019-09-04 RX ORDER — MIDODRINE HYDROCHLORIDE 5 MG/1
5 TABLET ORAL TAKE AS DIRECTED
COMMUNITY
End: 2020-02-09 | Stop reason: HOSPADM

## 2019-09-04 RX ORDER — BRIMONIDINE TARTRATE 2 MG/ML
1 SOLUTION/ DROPS OPHTHALMIC DAILY
Status: DISCONTINUED | OUTPATIENT
Start: 2019-09-04 | End: 2019-09-05 | Stop reason: HOSPADM

## 2019-09-04 RX ORDER — MIDODRINE HYDROCHLORIDE 2.5 MG/1
5 TABLET ORAL DAILY PRN
Status: DISCONTINUED | OUTPATIENT
Start: 2019-09-04 | End: 2019-09-05 | Stop reason: HOSPADM

## 2019-09-04 RX ORDER — LATANOPROST 50 UG/ML
1 SOLUTION/ DROPS OPHTHALMIC NIGHTLY
Status: DISCONTINUED | OUTPATIENT
Start: 2019-09-04 | End: 2019-09-04 | Stop reason: ALTCHOICE

## 2019-09-04 RX ORDER — LIDOCAINE AND PRILOCAINE 25; 25 MG/G; MG/G
1 CREAM TOPICAL TAKE AS DIRECTED
COMMUNITY
End: 2020-03-18

## 2019-09-04 RX ORDER — ATROPINE SULFATE 10 MG/ML
1 SOLUTION/ DROPS OPHTHALMIC 2 TIMES DAILY
Status: DISCONTINUED | OUTPATIENT
Start: 2019-09-04 | End: 2019-09-05 | Stop reason: HOSPADM

## 2019-09-04 RX ORDER — SERTRALINE HYDROCHLORIDE 25 MG/1
25 TABLET, FILM COATED ORAL NIGHTLY
COMMUNITY
End: 2019-12-11 | Stop reason: HOSPADM

## 2019-09-04 RX ORDER — SODIUM CHLORIDE 9 MG/ML
50 INJECTION, SOLUTION INTRAVENOUS CONTINUOUS
Status: DISCONTINUED | OUTPATIENT
Start: 2019-09-04 | End: 2019-09-04

## 2019-09-04 RX ORDER — LATANOPROST 50 UG/ML
1 SOLUTION/ DROPS OPHTHALMIC NIGHTLY
Status: DISCONTINUED | OUTPATIENT
Start: 2019-09-04 | End: 2019-09-05 | Stop reason: HOSPADM

## 2019-09-04 RX ORDER — METHYLPREDNISOLONE SODIUM SUCCINATE 40 MG/ML
40 INJECTION, POWDER, LYOPHILIZED, FOR SOLUTION INTRAMUSCULAR; INTRAVENOUS EVERY 6 HOURS
Status: DISCONTINUED | OUTPATIENT
Start: 2019-09-04 | End: 2019-09-05 | Stop reason: HOSPADM

## 2019-09-04 RX ORDER — IPRATROPIUM BROMIDE AND ALBUTEROL SULFATE 2.5; .5 MG/3ML; MG/3ML
3 SOLUTION RESPIRATORY (INHALATION)
Status: DISCONTINUED | OUTPATIENT
Start: 2019-09-04 | End: 2019-09-05 | Stop reason: HOSPADM

## 2019-09-04 RX ORDER — MELATONIN
2000 DAILY
Status: ON HOLD | COMMUNITY
End: 2021-01-01 | Stop reason: SDDI

## 2019-09-04 RX ORDER — DORZOLAMIDE HCL 20 MG/ML
1 SOLUTION/ DROPS OPHTHALMIC 3 TIMES DAILY
Status: DISCONTINUED | OUTPATIENT
Start: 2019-09-04 | End: 2019-09-05 | Stop reason: HOSPADM

## 2019-09-04 RX ORDER — ACETAMINOPHEN 650 MG/1
650 SUPPOSITORY RECTAL EVERY 4 HOURS PRN
Status: DISCONTINUED | OUTPATIENT
Start: 2019-09-04 | End: 2019-09-05 | Stop reason: HOSPADM

## 2019-09-04 RX ORDER — SODIUM CHLORIDE 0.9 % (FLUSH) 0.9 %
10 SYRINGE (ML) INJECTION EVERY 12 HOURS SCHEDULED
Status: DISCONTINUED | OUTPATIENT
Start: 2019-09-04 | End: 2019-09-05 | Stop reason: HOSPADM

## 2019-09-04 RX ORDER — FAMOTIDINE 20 MG/1
20 TABLET, FILM COATED ORAL 2 TIMES DAILY
Status: ON HOLD | COMMUNITY
End: 2020-04-29 | Stop reason: SDUPTHER

## 2019-09-04 RX ORDER — ASPIRIN 81 MG/1
81 TABLET ORAL DAILY
Status: DISCONTINUED | OUTPATIENT
Start: 2019-09-04 | End: 2019-09-05 | Stop reason: HOSPADM

## 2019-09-04 RX ORDER — SEVELAMER HYDROCHLORIDE 800 MG/1
2400 TABLET, FILM COATED ORAL
Status: ON HOLD | COMMUNITY
End: 2021-01-01 | Stop reason: ALTCHOICE

## 2019-09-04 RX ORDER — ONDANSETRON 4 MG/1
4 TABLET, FILM COATED ORAL EVERY 6 HOURS PRN
Status: DISCONTINUED | OUTPATIENT
Start: 2019-09-04 | End: 2019-09-05 | Stop reason: HOSPADM

## 2019-09-04 RX ORDER — GUAIFENESIN 600 MG/1
1200 TABLET, EXTENDED RELEASE ORAL EVERY 12 HOURS SCHEDULED
Status: DISCONTINUED | OUTPATIENT
Start: 2019-09-04 | End: 2019-09-05 | Stop reason: HOSPADM

## 2019-09-04 RX ORDER — FLUTICASONE PROPIONATE 50 MCG
2 SPRAY, SUSPENSION (ML) NASAL DAILY
Status: DISCONTINUED | OUTPATIENT
Start: 2019-09-04 | End: 2019-09-05 | Stop reason: HOSPADM

## 2019-09-04 RX ORDER — TRAVOPROST OPHTHALMIC SOLUTION 0.04 MG/ML
1 SOLUTION OPHTHALMIC NIGHTLY
COMMUNITY
End: 2020-03-18

## 2019-09-04 RX ORDER — ACETAMINOPHEN 160 MG/5ML
650 SOLUTION ORAL EVERY 4 HOURS PRN
Status: DISCONTINUED | OUTPATIENT
Start: 2019-09-04 | End: 2019-09-05 | Stop reason: HOSPADM

## 2019-09-04 RX ORDER — SODIUM CHLORIDE 0.9 % (FLUSH) 0.9 %
10 SYRINGE (ML) INJECTION AS NEEDED
Status: DISCONTINUED | OUTPATIENT
Start: 2019-09-04 | End: 2019-09-05 | Stop reason: HOSPADM

## 2019-09-04 RX ADMIN — METHYLPREDNISOLONE SODIUM SUCCINATE 40 MG: 40 INJECTION, POWDER, FOR SOLUTION INTRAMUSCULAR; INTRAVENOUS at 12:17

## 2019-09-04 RX ADMIN — DORZOLAMIDE HYDROCHLORIDE 1 DROP: 20 SOLUTION OPHTHALMIC at 21:04

## 2019-09-04 RX ADMIN — IPRATROPIUM BROMIDE AND ALBUTEROL SULFATE 3 ML: 2.5; .5 SOLUTION RESPIRATORY (INHALATION) at 07:27

## 2019-09-04 RX ADMIN — DORZOLAMIDE HYDROCHLORIDE 1 DROP: 20 SOLUTION OPHTHALMIC at 17:19

## 2019-09-04 RX ADMIN — CEFTRIAXONE SODIUM 1 G: 1 INJECTION, POWDER, FOR SOLUTION INTRAMUSCULAR; INTRAVENOUS at 21:02

## 2019-09-04 RX ADMIN — TIMOLOL MALEATE 1 DROP: 5 SOLUTION/ DROPS OPHTHALMIC at 21:02

## 2019-09-04 RX ADMIN — INSULIN DETEMIR 10 UNITS: 100 INJECTION, SOLUTION SUBCUTANEOUS at 20:55

## 2019-09-04 RX ADMIN — ATROPINE SULFATE 1 DROP: 10 SOLUTION/ DROPS OPHTHALMIC at 12:08

## 2019-09-04 RX ADMIN — MINERAL OIL 360 ML: 1000 LIQUID ORAL at 12:16

## 2019-09-04 RX ADMIN — INSULIN LISPRO 4 UNITS: 100 INJECTION, SOLUTION INTRAVENOUS; SUBCUTANEOUS at 20:56

## 2019-09-04 RX ADMIN — FAMOTIDINE 20 MG: 20 TABLET, FILM COATED ORAL at 12:14

## 2019-09-04 RX ADMIN — LOSARTAN POTASSIUM 100 MG: 50 TABLET, FILM COATED ORAL at 12:16

## 2019-09-04 RX ADMIN — LATANOPROST 1 DROP: 50 SOLUTION OPHTHALMIC at 02:55

## 2019-09-04 RX ADMIN — IPRATROPIUM BROMIDE AND ALBUTEROL SULFATE 3 ML: 2.5; .5 SOLUTION RESPIRATORY (INHALATION) at 14:49

## 2019-09-04 RX ADMIN — NICOTINE 1 PATCH: 21 PATCH, EXTENDED RELEASE TRANSDERMAL at 02:53

## 2019-09-04 RX ADMIN — CALCITRIOL 0.25 MCG: 0.25 CAPSULE ORAL at 12:11

## 2019-09-04 RX ADMIN — FLUTICASONE PROPIONATE 2 SPRAY: 50 SPRAY, METERED NASAL at 12:14

## 2019-09-04 RX ADMIN — SODIUM CHLORIDE, PRESERVATIVE FREE 10 ML: 5 INJECTION INTRAVENOUS at 21:01

## 2019-09-04 RX ADMIN — TIMOLOL MALEATE 1 DROP: 5 SOLUTION/ DROPS OPHTHALMIC at 17:19

## 2019-09-04 RX ADMIN — ACETAMINOPHEN 650 MG: 325 TABLET, FILM COATED ORAL at 03:39

## 2019-09-04 RX ADMIN — METHYLPREDNISOLONE SODIUM SUCCINATE 40 MG: 40 INJECTION, POWDER, FOR SOLUTION INTRAMUSCULAR; INTRAVENOUS at 17:20

## 2019-09-04 RX ADMIN — GUAIFENESIN 1200 MG: 600 TABLET, EXTENDED RELEASE ORAL at 12:15

## 2019-09-04 RX ADMIN — ENOXAPARIN SODIUM 30 MG: 30 INJECTION SUBCUTANEOUS at 12:13

## 2019-09-04 RX ADMIN — PRAVASTATIN SODIUM 10 MG: 20 TABLET ORAL at 02:53

## 2019-09-04 RX ADMIN — TIMOLOL MALEATE 1 DROP: 5 SOLUTION/ DROPS OPHTHALMIC at 12:18

## 2019-09-04 RX ADMIN — METHYLPREDNISOLONE SODIUM SUCCINATE 40 MG: 40 INJECTION, POWDER, FOR SOLUTION INTRAMUSCULAR; INTRAVENOUS at 02:54

## 2019-09-04 RX ADMIN — DORZOLAMIDE HYDROCHLORIDE 1 DROP: 20 SOLUTION OPHTHALMIC at 12:12

## 2019-09-04 RX ADMIN — SODIUM CHLORIDE 50 ML/HR: 9 INJECTION, SOLUTION INTRAVENOUS at 02:54

## 2019-09-04 RX ADMIN — GUAIFENESIN 1200 MG: 600 TABLET, EXTENDED RELEASE ORAL at 21:00

## 2019-09-04 RX ADMIN — INSULIN LISPRO 4 UNITS: 100 INJECTION, SOLUTION INTRAVENOUS; SUBCUTANEOUS at 17:46

## 2019-09-04 RX ADMIN — SODIUM CHLORIDE, PRESERVATIVE FREE 10 ML: 5 INJECTION INTRAVENOUS at 12:17

## 2019-09-04 RX ADMIN — LATANOPROST 1 DROP: 50 SOLUTION/ DROPS OPHTHALMIC at 21:02

## 2019-09-04 RX ADMIN — CALCIUM 500 MG: 500 TABLET ORAL at 12:11

## 2019-09-04 RX ADMIN — BRIMONIDINE TARTRATE 1 DROP: 2 SOLUTION OPHTHALMIC at 12:09

## 2019-09-04 RX ADMIN — CETIRIZINE HYDROCHLORIDE 10 MG: 10 TABLET, FILM COATED ORAL at 12:12

## 2019-09-04 RX ADMIN — ASPIRIN 81 MG: 81 TABLET ORAL at 12:05

## 2019-09-04 RX ADMIN — METHYLPREDNISOLONE SODIUM SUCCINATE 40 MG: 40 INJECTION, POWDER, FOR SOLUTION INTRAMUSCULAR; INTRAVENOUS at 21:01

## 2019-09-04 RX ADMIN — IPRATROPIUM BROMIDE AND ALBUTEROL SULFATE 3 ML: 2.5; .5 SOLUTION RESPIRATORY (INHALATION) at 03:29

## 2019-09-04 RX ADMIN — ATROPINE SULFATE 1 DROP: 10 SOLUTION/ DROPS OPHTHALMIC at 21:02

## 2019-09-04 RX ADMIN — ACETAMINOPHEN 1000 MG: 500 TABLET ORAL at 10:58

## 2019-09-04 RX ADMIN — DOXYCYCLINE 100 MG: 100 INJECTION, POWDER, LYOPHILIZED, FOR SOLUTION INTRAVENOUS at 00:54

## 2019-09-04 RX ADMIN — GUAIFENESIN 1200 MG: 600 TABLET, EXTENDED RELEASE ORAL at 02:53

## 2019-09-04 RX ADMIN — INSULIN DETEMIR 10 UNITS: 100 INJECTION, SOLUTION SUBCUTANEOUS at 02:53

## 2019-09-04 RX ADMIN — PRAVASTATIN SODIUM 10 MG: 20 TABLET ORAL at 21:00

## 2019-09-04 RX ADMIN — IPRATROPIUM BROMIDE AND ALBUTEROL SULFATE 3 ML: 2.5; .5 SOLUTION RESPIRATORY (INHALATION) at 19:59

## 2019-09-04 NOTE — PAYOR COMM NOTE
"ADMIT INPT 9-3-19  MEDICARE PRIMARY  UR  [phone]    Mini Peña (59 y.o. Female)     Date of Birth Social Security Number Address Home Phone MRN    [id number]  [id number] [place] [id number] [phone] [id number]    [employer]vent Marital Status          Other        Admission Date Admission Type Admitting Provider Attending Provider Department, Room/Bed    9/3/19 Emergency Joseph Dempsey DO Robinson, Maurice S, DO [address], 495/1    Discharge Date Discharge Disposition Discharge Destination                       Attending Provider:  Joseph Dempsey DO    Allergies:  Bupropion, Chantix [Varenicline], Sulfa Antibiotics, Azithromycin, Levofloxacin, Penicillins    Isolation:  None   Infection:  None   Code Status:  CPR    Ht:  149.9 cm (59\")   Wt:  59.6 kg (131 lb 6.4 oz)    Admission Cmt:  None   Principal Problem:  None                Active Insurance as of 9/3/2019     Primary Coverage     Payor Plan Insurance Group Employer/Plan Group    MEDICARE MEDICARE A & B      Payor Plan Address Payor Plan Phone Number Payor Plan Fax Number Effective Dates    [address] [phone]  7/1/2017 - None Entered    [facility] [id number]       Subscriber Name Subscriber Birth Date Member ID       MINI PEÑA 1960 [id number]           Secondary Coverage     Payor Plan Insurance Group Employer/Plan Group    [employer] [place]ICA[place]      Payor Plan Address Payor Plan Phone Number Payor Plan Fax Number Effective Dates    [address] [address] [phone]  11/4/2016 - None Entered    [facility] [id number]       Subscriber Name Subscriber Birth Date Member ID       MINI PEÑA 1960 [id number]                 Emergency Contacts      (Rel.) Home Phone Work Phone Mobile Phone    Erica Bowling (Daughter) -- -- [phone]               History & Physical      Tejinder Kent MD at 9/3/2019 11:30 PM              [facility] " Hospital Medicine Services  HISTORY AND PHYSICAL    Date of Admission: 9/3/2019  Primary Care Physician: Ivan Kelly MD    Subjective     Chief Complaint: Cough nausea vomiting    History of Present Illness  Patient is a 59-year-old who presents to the emergency room with a one-week history of cough productive of green sputum nausea shortness of breath.  She also complains that she has had a bowel movement in a week.  She was evaluated in the ER found to have on chest x-ray a linear infiltrate probably involving the lingula left lung.  Her problems are further complicated by the fact that she has end-stage renal disease on hemodialysis as well as type 2 diabetes.  She also still smokes 2 packs a day.  CT scan of her abdomen is unremarkable for acute problems except for the infiltrate seen consistent with the pneumonia on chest x-ray.  Her other problems include glaucoma she is blind in the right eye.  We have been asked to admit her for further evaluation and management of pneumonia.        Review of Systems   14 point review of systems negative except for as per HPI    Otherwise complete ROS reviewed and negative except as mentioned in the HPI.    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 Her smoking use included cigarettes. She smoked 2.00 packs per day. She  has never used smokeless tobacco. She reports that she does not drink alcohol or use drugs.    Family History: family history includes Heart disease in her mother.       Allergies:  Allergies   Allergen Reactions   • Bupropion Nausea Only   • Chantix [Varenicline]    • Sulfa Antibiotics    • Azithromycin Rash   • Levofloxacin Rash   • Penicillins Rash     Medications:  Prior to Admission medications    Medication Sig Start Date End Date Taking? Authorizing Provider   aspirin 81 MG EC tablet Take 81 mg by mouth Daily.    Steve Warren MD   benzonatate (TESSALON) 100 MG capsule Take 1 capsule by mouth 3 (Three) Times a Day As Needed for Cough. 4/1/17   Ivy Jernigan APRN   bimatoprost (LUMIGAN) 0.01 % ophthalmic drops Administer 1 drop into the left eye Every Night.    Steve Warren MD   brimonidine (ALPHAGAN) 0.2 % ophthalmic solution Administer 1 drop into the left eye Daily.    Steve Warren MD   calcitriol (ROCALTROL) 0.25 MCG capsule Take 0.25 mcg by mouth Daily.    Steve Warren MD   calcium carbonate (TUMS) 500 MG chewable tablet Chew 1 tablet 3 (Three) Times a Day.    Emergency, Nurse Epic, RN   Cetirizine HCl 10 MG capsule Take 10 mg by mouth.    Steve Warren MD   dorzolamide (TRUSOPT) 2 % ophthalmic solution Administer 1 drop into the left eye 2 (Two) Times a Day.    Steve Warren MD   fluticasone (FLONASE) 50 MCG/ACT nasal spray SHAKE LQ AND U 2 SPRAYS IEN D 4/28/17   Steve Warren MD   homatropine 5 % ophthalmic solution Administer 1 drop into the left eye Daily.    Steve Warren MD   insulin glargine (LANTUS) 100 UNIT/ML injection Inject 10 Units under the skin Every Night.    Steve Warren MD   lidocaine-prilocaine (EMLA) 2.5-2.5 % cream APPLY TO ACCESS AREA 30 MINUTES PRIOR TO HEMODIALYSIS THREE TIMES WEEKLY 4/21/17   Steve Warren MD   losartan (COZAAR) 100 MG tablet Take 100 mg by mouth Daily.    Steve Warren MD  "  midodrine (PROAMATINE) 5 MG tablet TK 1 T PO HALF WAY THROUGH TREATMENT AS DIRECTED 10/17/17   Steve Warren MD   pravastatin (PRAVACHOL) 10 MG tablet Take 10 mg by mouth Every Night.    Steve Warren MD   raNITIdine (ZANTAC) 150 MG tablet Take 150 mg by mouth 2 (Two) Times a Day.    Steve Warren MD   timolol (TIMOPTIC-XR) 0.5 % ophthalmic gel-forming Administer 1 drop into the left eye 3 (Three) Times a Day.    Steve Warren MD     Objective     Vital Signs: BP (!) 187/90   Pulse 91   Temp 97.8 °F (36.6 °C)   Resp 16   Ht 149.9 cm (59\")   Wt 60.8 kg (134 lb)   SpO2 94%   BMI 27.06 kg/m²    Physical Exam  Gen.:  Well-nourished well-developed white female in no acute distress  HEENT: Atraumatic, normocephalic.  Pupils equal, round, and reactive to light. Extraocular movements intact.  Sclerae anicteric.  TMs negative.  Mucous membranes moist.  Neck is supple without lymphadenopathy.  No JVD is noted.  No carotid bruits are auscultated.  Oropharynx is without erythema or exudate.   Chest: Rhonchi diffusely both lungs mild wheezes.  CV: Regular rate and rhythm.  Normal S1-S2.  No gallops, murmurs. or rubs.  Abdomen: Soft, nontender, nondistended.  Active bowel sounds,  No hepatosplenomegaly.  No masses.  No hernias.  Extremities: No clubbing, edema, or cyanosis.  Capillary refill is normal.  Pulses are 2+ and symmetric at radial and dorsalis pedis.  Posterior tibial pulses are intact and 2+ palpable.  Neuro: Patient is awake, alert, and oriented ×3.  Cranial nerves II through XII are grossly intact.  Motor is 5 out of 5 bilaterally.  DTRs are 2+ and symmetric bilaterally. Sensory exam is nonfocal  Skin: Warm, dry, and intact.  No evidence of breakdown.  Sensorium: Normal thought and affect    Nursing notes and vital signs reviewed        Results Reviewed:  Lab Results (last 24 hours)     Procedure Component Value Units Date/Time    aPTT [[id number]]  (Normal) Collected:  " 09/03/19 2214    Specimen:  Blood Updated:  09/03/19 2308     PTT 31.5 seconds     Protime-INR [[id number]]  (Normal) Collected:  09/03/19 2214    Specimen:  Blood Updated:  09/03/19 2308     Protime 13.0 Seconds      INR 0.95    TSH [[id number]]  (Normal) Collected:  09/03/19 2214    Specimen:  Blood Updated:  09/03/19 2306     TSH 1.410 uIU/mL     Troponin [[id number]]  (Normal) Collected:  09/03/19 2214    Specimen:  Blood Updated:  09/03/19 2249     Troponin I 0.033 ng/mL     BNP [[id number]]  (Abnormal) Collected:  09/03/19 2214    Specimen:  Blood Updated:  09/03/19 2249     proBNP 3,360.0 pg/mL     Comprehensive Metabolic Panel [[id number]]  (Abnormal) Collected:  09/03/19 2214    Specimen:  Blood Updated:  09/03/19 2237     Glucose 109 mg/dL      BUN 45 mg/dL      Creatinine 7.55 mg/dL      Sodium 143 mmol/L      Potassium 3.9 mmol/L      Chloride 98 mmol/L      CO2 25.0 mmol/L      Calcium 9.3 mg/dL      Total Protein 7.8 g/dL      Albumin 4.60 g/dL      ALT (SGPT) 20 U/L      AST (SGOT) 32 U/L      Alkaline Phosphatase 74 U/L      Total Bilirubin 1.0 mg/dL      eGFR Non African Amer 5 mL/min/1.73      Comment: <15 Indicative of kidney failure.        eGFR   Amer --     Comment: <15 Indicative of kidney failure.        Globulin 3.2 gm/dL      A/G Ratio 1.4 g/dL      BUN/Creatinine Ratio 6.0     Anion Gap 20.0 mmol/L     Narrative:       GFR Normal >60  Chronic Kidney Disease <60  Kidney Failure <15    Lipase [[id number]]  (Normal) Collected:  09/03/19 2214    Specimen:  Blood Updated:  09/03/19 2237     Lipase 194 U/L     Magnesium [[id number]]  (Abnormal) Collected:  09/03/19 2214    Specimen:  Blood Updated:  09/03/19 2237     Magnesium 2.3 mg/dL     Lactic Acid, Plasma [[id number]]  (Normal) Collected:  09/03/19 2214    Specimen:  Blood Updated:  09/03/19 2235     Lactate 1.3 mmol/L     Blood Culture - Blood, Arm, Right [[id number]] Collected:  09/03/19 2215    Specimen:  Blood from Arm, Right Updated:   09/03/19 2233    Blood Culture - Blood, Arm, Right [[id number]] Collected:  09/03/19 2113    Specimen:  Blood from Arm, Right Updated:  09/03/19 2232    CBC & Differential [[id number]] Collected:  09/03/19 2214    Specimen:  Blood Updated:  09/03/19 2225    Narrative:       The following orders were created for panel order CBC & Differential.  Procedure                               Abnormality         Status                     ---------                               -----------         ------                     CBC Auto Differential[[id number]]        Abnormal            Final result                 Please view results for these tests on the individual orders.    CBC Auto Differential [[id number]]  (Abnormal) Collected:  09/03/19 2214    Specimen:  Blood Updated:  09/03/19 2225     WBC 6.37 10*3/mm3      RBC 4.20 10*6/mm3      Hemoglobin 13.3 g/dL      Hematocrit 39.6 %      MCV 94.3 fL      MCH 31.7 pg      MCHC 33.6 g/dL      RDW 14.6 %      RDW-SD 50.7 fl      MPV 8.8 fL      Platelets 187 10*3/mm3      Neutrophil % 74.4 %      Lymphocyte % 20.1 %      Monocyte % 4.7 %      Eosinophil % 0.2 %      Basophil % 0.3 %      Immature Grans % 0.3 %      Neutrophils, Absolute 4.74 10*3/mm3      Lymphocytes, Absolute 1.28 10*3/mm3      Monocytes, Absolute 0.30 10*3/mm3      Eosinophils, Absolute 0.01 10*3/mm3      Basophils, Absolute 0.02 10*3/mm3      Immature Grans, Absolute 0.02 10*3/mm3      nRBC 0.0 /100 WBC         Imaging Results (last 24 hours)     Procedure Component Value Units Date/Time    CT Abdomen Pelvis Without Contrast [[id number]] Collected:  09/03/19 2158     Updated:  09/03/19 2204    Narrative:       EXAMINATION: CT ABDOMEN PELVIS WO CONTRAST-      9/3/2019 9:51 PM CDT     HISTORY: Abdominal pain with nausea and vomiting.     In order to have a CT radiation dose as low as reasonably achievable  Automated Exposure Control was utilized for adjustment of the mA and/or  KV according to patient size.     DLP  in mGycm= 255.     Noncontrast abdomen/pelvis CT.     No renal or ureteral stone.  No hydronephrosis.     Linear band of infiltrate along left heart border is incompletely  visualized on this abdomen/pelvis CT the head the appearance of  pneumonia on chest x-ray.  Normal heart size.  The right lower lobe is clear.     Cholecystectomy clips are present.  Normal noncontrast appearance of the liver, pancreas, and spleen.  Normal and symmetric adrenal glands.     Aortic calcification with no aneurysm.     No bowel obstruction.  No appendicitis, diverticulitis, or colitis.     No pelvic mass or fluid.     Summary:  1. Findings suspicious for lingular infiltrate along the left heart  border. This was better visualized on the recent chest x-ray.  2. No mass, fluid collection, or inflammatory process is seen within the  abdomen pelvis.                                   This report was finalized on 09/03/2019 22:01 by Dr. Dharmesh Zuleta MD.    XR Chest 1 View [[id number]] Collected:  09/03/19 2144     Updated:  09/03/19 2150    Narrative:       EXAMINATION: XR CHEST 1 VW-     9/3/2019 9:37 PM CDT     HISTORY: Short of breath.     One view chest x-ray compared with 06/08/2006.     Patchy infiltrate along the left heart border probably involves the  lingula.     The upper lobes are clear.     There is no pneumothorax or heart failure.     Summary:  1. Left-sided pneumonia along the left heart border probably within a  lingular segment of the left upper lobe.        This report was finalized on 09/03/2019 21:47 by Dr. Dharmesh Zuleta MD.        I have personally reviewed and interpreted the radiology studies and ECG obtained at time of admission.     Assessment / Plan     Assessment:   Active Hospital Problems    Diagnosis   • Pneumonia due to infectious organism   • End stage renal failure on dialysis (CMS/HCC)   • Diabetic nephropathy (CMS/[facility])   • Glaucoma   • HTN (hypertension)   1.  Pneumonia is to admit patient culture blood  and sputum.  We will try to check urine for Legionella and strep pneumo.  Initiate antibiotics with Rocephin and doxycycline.  Aggressive pulmonary toilet with Mucinex DuoNeb's.  She is also wheezing I suspect she has a component of COPD with this since it is in both lung fields.  We will add Solu-Medrol as well.  2.  Suspected COPD with exacerbation plans as per above Solu-Medrol DuoNeb's Mucinex and antibiotics.  3.  End-stage renal disease on hemodialysis consult nephrology to continue dialysis while she is here.  4.  Type 2 diabetes Accu-Chek sliding scale insulin check A1c.  We will also continue her home long-acting insulin.  5.  Glaucoma continue multiple eyedrops that she is on from home meds.  6.  Tobacco habituation encouraged smoking cessation nicotine patch is ordered.  7.  Constipation will order patient Alfredo enema.      Patient seen prior to midnight         Code Status: Full     I discussed the patient's findings and my recommendations with the patient and Sundeep who is her significant other and also her surrogate decision maker should she not be able to speak for herself.    Estimated length of stay 2 days    Tejinder Kent MD   09/04/19   12:14 AM              Electronically signed by Tejinder Kent MD at 9/4/2019 12:32 AM          Emergency Department Notes      Lexi Fitzpatrick, RN at 9/3/2019 10:27 PM        RT AT BEDSIDE FOR BREATHING TREATMENT.      Lexi Fitzpatrick, RN  09/03/19 2228      Electronically signed by Lexi Fitzpatrick, RN at 9/3/2019 10:28 PM     Gomez Romeo PA-C at 9/3/2019 11:26 PM     Attestation signed by Brian Vivas MD at 9/4/2019  7:30 AM      I did not see actively see this patient however I have reviewed the PA/NPs note and agree with the above    CT Abdomen Pelvis Without Contrast   Final Result      XR Chest 1 View   Final Result        Labs Reviewed   STREP PNEUMO AG, URINE OR CSF - Abnormal; Notable for the following components:       Result Value     Strep Pneumo Ag Positive (*)     All other components within normal limits    Narrative:     Streptococcus pneumoniae (pneumococcal pneumonia) vaccine may cause false-positive results, especially in patients who have received the vaccine within 5 days of having the test performed.   COMPREHENSIVE METABOLIC PANEL - Abnormal; Notable for the following components:    Glucose 109 (*)     BUN 45 (*)     Creatinine 7.55 (*)     eGFR Non African Amer 5 (*)     BUN/Creatinine Ratio 6.0 (*)     Anion Gap 20.0 (*)     All other components within normal limits    Narrative:     GFR Normal >60  Chronic Kidney Disease <60  Kidney Failure <15   URINALYSIS W/ CULTURE IF INDICATED - Abnormal; Notable for the following components:    Glucose,  mg/dL (Trace) (*)     Ketones, UA 15 mg/dL (1+) (*)     Blood, UA Small (1+) (*)     Protein, UA >=300 mg/dL (3+) (*)     All other components within normal limits   BNP (IN-HOUSE) - Abnormal; Notable for the following components:    proBNP 3,360.0 (*)     All other components within normal limits   MAGNESIUM - Abnormal; Notable for the following components:    Magnesium 2.3 (*)     All other components within normal limits   CBC WITH AUTO DIFFERENTIAL - Abnormal; Notable for the following components:    Monocyte % 4.7 (*)     Eosinophil % 0.2 (*)     All other components within normal limits   PHOSPHORUS - Abnormal; Notable for the following components:    Phosphorus 5.9 (*)     All other components within normal limits   COMPREHENSIVE METABOLIC PANEL - Abnormal; Notable for the following components:    Glucose 226 (*)     BUN 49 (*)     Creatinine 7.54 (*)     CO2 21.0 (*)     eGFR Non African Amer 5 (*)     BUN/Creatinine Ratio 6.5 (*)     Anion Gap 19.0 (*)     All other components within normal limits    Narrative:     GFR Normal >60  Chronic Kidney Disease <60  Kidney Failure <15   CBC WITH AUTO DIFFERENTIAL - Abnormal; Notable for the following components:    Neutrophil % 93.1  (*)     Lymphocyte % 5.4 (*)     Monocyte % 1.1 (*)     Eosinophil % 0.0 (*)     Lymphocytes, Absolute 0.39 (*)     Monocytes, Absolute 0.08 (*)     All other components within normal limits   HEMOGLOBIN A1C - Abnormal; Notable for the following components:    Hemoglobin A1C 6.3 (*)     All other components within normal limits    Narrative:     Less than 6.0           Non-Diabetic Range  6.0-7.0                 ADA Therapeutic Target  Greater than 7.0        Action Suggested   PHOSPHORUS - Abnormal; Notable for the following components:    Phosphorus 6.0 (*)     All other components within normal limits   URINALYSIS, MICROSCOPIC ONLY - Abnormal; Notable for the following components:    RBC, UA 0-2 (*)     WBC, UA 0-2 (*)     Bacteria, UA Trace (*)     All other components within normal limits   POCT GLUCOSE FINGERSTICK - Abnormal; Notable for the following components:    Glucose 170 (*)     All other components within normal limits   LEGIONELLA ANTIGEN, URINE - Normal   APTT - Normal   PROTIME-INR - Normal   LIPASE - Normal   TROPONIN (IN-HOUSE) - Normal   LACTIC ACID, PLASMA - Normal   TSH - Normal   T4, FREE - Normal   MAGNESIUM - Normal   BLOOD CULTURE   BLOOD CULTURE   RESPIRATORY CULTURE   POCT GLUCOSE FINGERSTICK   POCT GLUCOSE FINGERSTICK   POCT GLUCOSE FINGERSTICK   POCT GLUCOSE FINGERSTICK   POCT GLUCOSE FINGERSTICK   CBC AND DIFFERENTIAL    Narrative:     The following orders were created for panel order CBC & Differential.  Procedure                               Abnormality         Status                     ---------                               -----------         ------                     CBC Auto Differential[[id number]]        Abnormal            Final result                 Please view results for these tests on the individual orders.           For this patient encounter, I reviewed the NP or PA documentation, treatment plan, and medical decision making. Brian Vivas MD 9/4/2019 7:30  AM                  Subjective   History of Present Illness  59-year-old female presents with a chief complaint cough, shortness of breath, nausea.  Patient reports symptoms have been present 2 weeks progressive and getting worse.  No abdominal pain.  She reports low-grade fevers at home.  No diarrhea.  No BM for 1 week  Review of Systems   All other systems reviewed and are negative.      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    • Chronic otitis media    • Diabetes (CMS/HCC)    • Eustachian tube dysfunction    • Glaucoma    • HTN (hypertension)    • Kidney failure     on dialysis   • Liver disorder    • Mucoid otitis media        Allergies   Allergen Reactions   • Bupropion Nausea Only   • Chantix [Varenicline]    • Sulfa Antibiotics    • Azithromycin Rash   • Levofloxacin Rash   • Penicillins Rash       Past Surgical History:   Procedure Laterality Date   • ARTERIOVENOUS FISTULA     • CATARACT EXTRACTION WITH INTRAOCULAR LENS IMPLANT     •  SECTION     • CHOLECYSTECTOMY     • MYRINGOTOMY W/ TUBES Bilateral    • MYRINGOTOMY W/ TUBES Right    • TUBAL ABDOMINAL LIGATION         Family History   Problem Relation Age of Onset   • Heart disease Mother        Social History     Socioeconomic History   • Marital status:      Spouse name: Not on file   • Number of children: Not on file   • Years of education: Not on file   • Highest education level: Not on file   Tobacco Use   • Smoking status: Former Smoker     Packs/day: 2.00     Types: Cigarettes     Last attempt to quit: 10/27/2017     Years since quittin.8   • Smokeless tobacco: Never Used   Substance and Sexual Activity   • Alcohol use: No   • Drug use: No   • Sexual activity: Defer           Objective   Physical Exam   Constitutional: She is oriented to person, place, and time. She appears well-developed and well-nourished.   HENT:   Head: Normocephalic and atraumatic.   Eyes: EOM are normal.  Pupils are equal, round, and reactive to light.   Neck: Normal range of motion. Neck supple.   Cardiovascular: Normal rate and regular rhythm.   Pulmonary/Chest: Effort normal. She has wheezes. She has rales.   Abdominal: Soft. Bowel sounds are normal. She exhibits no distension. There is no tenderness.   Musculoskeletal: Normal range of motion.   Neurological: She is alert and oriented to person, place, and time. No cranial nerve deficit. Coordination normal.   Skin: Skin is warm. Capillary refill takes less than 2 seconds.   Psychiatric: She has a normal mood and affect. Her behavior is normal.   Nursing note and vitals reviewed.      Procedures          ED Course  Final Diagnosis: as of Sep 03 2333   Pneumonia due to infectious organism, unspecified laterality, unspecified part of lung   Chronic kidney disease, unspecified CKD stage      Labs Reviewed   COMPREHENSIVE METABOLIC PANEL - Abnormal; Notable for the following components:       Result Value    Glucose 109 (*)     BUN 45 (*)     Creatinine 7.55 (*)     eGFR Non African Amer 5 (*)     BUN/Creatinine Ratio 6.0 (*)     Anion Gap 20.0 (*)     All other components within normal limits    Narrative:     GFR Normal >60  Chronic Kidney Disease <60  Kidney Failure <15   BNP (IN-HOUSE) - Abnormal; Notable for the following components:    proBNP 3,360.0 (*)     All other components within normal limits   MAGNESIUM - Abnormal; Notable for the following components:    Magnesium 2.3 (*)     All other components within normal limits   CBC WITH AUTO DIFFERENTIAL - Abnormal; Notable for the following components:    Monocyte % 4.7 (*)     Eosinophil % 0.2 (*)     All other components within normal limits   APTT - Normal   PROTIME-INR - Normal   LIPASE - Normal   TROPONIN (IN-HOUSE) - Normal   LACTIC ACID, PLASMA - Normal   TSH - Normal   BLOOD CULTURE   BLOOD CULTURE   URINALYSIS W/ CULTURE IF INDICATED   CBC AND DIFFERENTIAL    Narrative:     The following orders were  created for panel order CBC & Differential.  Procedure                               Abnormality         Status                     ---------                               -----------         ------                     CBC Auto Differential[[id number]]        Abnormal            Final result                 Please view results for these tests on the individual orders.     CT Abdomen Pelvis Without Contrast   Final Result      XR Chest 1 View   Final Result                    MDM  Number of Diagnoses or Management Options  Chronic kidney disease, unspecified CKD stage: new and requires workup  Pneumonia due to infectious organism, unspecified laterality, unspecified part of lung: new and requires workup  Diagnosis management comments: PNA, admit to hospitalist        Amount and/or Complexity of Data Reviewed  Clinical lab tests: reviewed and ordered  Tests in the radiology section of CPT®:  ordered and reviewed  Tests in the medicine section of CPT®:  ordered and reviewed  Decide to obtain previous medical records or to obtain history from someone other than the patient: yes    Risk of Complications, Morbidity, and/or Mortality  Presenting problems: moderate  Diagnostic procedures: moderate  Management options: moderate    Patient Progress  Patient progress: stable               Gomez Romeo PA-C  09/03/19 1218      Electronically signed by Brian Vivas MD at 9/4/2019  7:30 AM     Lexi Fitzpatrick, RN at 9/4/2019 12:35 AM        PATIENT AMBULATES TO BATHROOM, GAIT STEADY, STATES SHE MISSED THE CUP FOR URINE SAMPLE.  PATIENTS  STATES HE WILL BRING PATIENTS MEDICATION LIST IN THE AM.       Lexi Fitzpatrick RN  09/04/19 0045      Electronically signed by Lexi Fitzpatrick RN at 9/4/2019 12:45 AM     Lexi Fitzpatrick RN at 9/4/2019 12:45 AM        PATIENT ADMITTED TO ROOM 495, REPORT TO BE GIVEN AT BEDSIDE.      Lexi Fitzpatrick RN  09/04/19 0048      Electronically signed by Lexi Fitzpatrick RN at  "9/4/2019 12:48 AM       ICU Vital Signs     Row Name 09/04/19 0746 09/04/19 0733 09/04/19 0727 09/04/19 0501 09/04/19 0500       Vitals    Temp  98.8 °F (37.1 °C)  --  --  --  98.7 °F (37.1 °C)    Temp src  Oral  --  --  --  Oral    Pulse  99  106  102  --  102    Heart Rate Source  Monitor  Monitor  Monitor  --  Monitor    Resp  16  16  16  --  18    Resp Rate Source  Visual  Visual  Visual  --  Visual    BP  146/55  --  --  --  148/58    BP Location  Right arm  --  --  --  Right arm    BP Method  Automatic  --  --  --  Automatic    Patient Position  Lying  --  --  --  Lying       Oxygen Therapy    SpO2  92 %  95 %  96 %  93 %  88 %  (Abnormal)     Pulse Oximetry Type  Continuous  Continuous  Continuous  Continuous  Continuous    Device (Oxygen Therapy)  nasal cannula  nasal cannula  nasal cannula  nasal cannula  room air    Flow (L/min)  2  2  2  2 Increased from RA. Pt desat while sleeping  --    Row Name 09/04/19 0336 09/04/19 0329 09/04/19 0100 09/04/19 00:45:05 09/03/19 23:31:45       Height and Weight    Height  --  --  149.9 cm (59\")  --  --    Height Method  --  --  Stated  --  --    Weight  --  --  59.6 kg (131 lb 6.4 oz)  --  --    Ideal Body Weight (IBW) (kg)  --  --  43.57  --  --    BSA (Calculated - sq m)  --  --  1.54 sq meters  --  --    BMI (Calculated)  --  --  26.5  --  --    Weight in (lb) to have BMI = 25  --  --  123.5  --  --       Vitals    Temp  --  --  99.7 °F (37.6 °C)  98 °F (36.7 °C)  --    Temp src  --  --  Oral  --  --    Pulse  95  97  96  88  --    Heart Rate Source  Monitor  Monitor  Monitor  --  --    Resp  16  16  18  16  --    Resp Rate Source  Visual  Visual  Visual  --  --    BP  --  --  177/81  157/73  162/70    Noninvasive MAP (mmHg)  --  --  --  --  85    BP Location  --  --  Right arm  --  --    BP Method  --  --  Automatic  --  --    Patient Position  --  --  Sitting  --  --       Oxygen Therapy    SpO2  95 %  91 %  92 %  95 %  --    Pulse Oximetry Type  Continuous  " "Continuous  Continuous  --  --    Device (Oxygen Therapy)  room air  room air  room air  --  --    Row Name 09/03/19 23:29:46 09/03/19 22:40:20 09/03/19 2234 09/03/19 2228 09/03/19 2057       Height and Weight    Height  --  --  --  --  149.9 cm (59\")    Height Method  --  --  --  --  Stated    Weight  --  --  --  --  60.8 kg (134 lb)    Weight Method  --  --  --  --  Standing scale    Ideal Body Weight (IBW) (kg)  --  --  --  --  43.57    BSA (Calculated - sq m)  --  --  --  --  1.56 sq meters    BMI (Calculated)  --  --  --  --  27.1    Weight in (lb) to have BMI = 25  --  --  --  --  123.5       Vitals    Temp  --  --  --  --  97.8 °F (36.6 °C)    Pulse  91  --  91  86  88    Heart Rate Source  --  --  Monitor  Monitor  --    Resp  --  16  16  16  16    Resp Rate Source  --  --  Visual  Visual  --    BP  --  187/90  (Abnormal)   --  --  179/88       Oxygen Therapy    SpO2  94 %  94 %  95 %  94 %  95 %    Pulse Oximetry Type  --  --  --  Continuous  --    Device (Oxygen Therapy)  --  --  --  room air  --        Intake & Output (last 3 days)       09/01 0701 - 09/02 0700 09/02 0701 - 09/03 0700 09/03 0701 - 09/04 0700 09/04 0701 - 09/05 0700    IV Piggyback   300     Total Intake(mL/kg)   300 (5)     Net   +300                  Lines, Drains & Airways    Active LDAs     Name:   Placement date:   Placement time:   Site:   Days:    Peripheral IV 09/03/19 2213 Upper;Right Arm   09/03/19 2213    Arm   less than 1         Inactive LDAs     None                Hospital Medications (all)       Dose Frequency Start End    acetaminophen (TYLENOL) 160 MG/5ML solution 650 mg 650 mg Every 4 Hours PRN 9/4/2019     Sig - Route: Take 20.3 mL by mouth Every 4 (Four) Hours As Needed for Mild Pain . - Oral    Linked Group 1:  \"Or\" Linked Group Details        acetaminophen (TYLENOL) suppository 650 mg 650 mg Every 4 Hours PRN 9/4/2019     Sig - Route: Insert 1 suppository into the rectum Every 4 (Four) Hours As Needed for Mild " "Pain . - Rectal    Linked Group 1:  \"Or\" Linked Group Details        acetaminophen (TYLENOL) tablet 650 mg 650 mg Every 4 Hours PRN 9/4/2019     Sig - Route: Take 2 tablets by mouth Every 4 (Four) Hours As Needed for Mild Pain  or Fever. - Oral    Linked Group 1:  \"Or\" Linked Group Details        aspirin EC tablet 81 mg 81 mg Daily 9/4/2019     Sig - Route: Take 1 tablet by mouth Daily. - Oral    atropine 1 % ophthalmic solution 1 drop 1 drop 2 Times Daily 9/4/2019     Sig - Route: Administer 1 drop into the left eye 2 (Two) Times a Day. - Left Eye    benzonatate (TESSALON) capsule 100 mg 100 mg 3 Times Daily PRN 9/4/2019     Sig - Route: Take 1 capsule by mouth 3 (Three) Times a Day As Needed for Cough. - Oral    brimonidine (ALPHAGAN) 0.2 % ophthalmic solution 1 drop 1 drop Daily 9/4/2019     Sig - Route: Administer 1 drop into the left eye Daily. - Left Eye    calcitriol (ROCALTROL) capsule 0.25 mcg 0.25 mcg Daily 9/4/2019     Sig - Route: Take 1 capsule by mouth Daily. - Oral    calcium carbonate (oyster shell) tablet 500 mg 500 mg 3 Times Daily 9/4/2019     Sig - Route: Take 1 tablet by mouth 3 (Three) Times a Day. - Oral    cefTRIAXone (ROCEPHIN) 1 g/100 mL 0.9% NS (MBP) 1 g Once 9/3/2019 9/3/2019    Sig - Route: Infuse 100 mL into a venous catheter 1 (One) Time. - Intravenous    Cosign for Ordering: Accepted by Brian Vivas MD on 9/4/2019  7:33 AM    cefTRIAXone (ROCEPHIN) 1 g/100 mL 0.9% NS (MBP) 1 g Every 24 Hours 9/4/2019 9/11/2019    Sig - Route: Infuse 100 mL into a venous catheter Daily. - Intravenous    Linked Group 2:  \"And\" Linked Group Details        cetirizine (zyrTEC) tablet 10 mg 10 mg Daily 9/4/2019     Sig - Route: Take 1 tablet by mouth Daily. - Oral    dextrose (D50W) 25 g/ 50mL Intravenous Solution 25 g 25 g Every 15 Minutes PRN 9/4/2019     Sig - Route: Infuse 50 mL into a venous catheter Every 15 (Fifteen) Minutes As Needed for Low Blood Sugar (Blood Sugar Less Than 70). - " "Intravenous    dextrose (GLUTOSE) oral gel 15 g 15 g Every 15 Minutes PRN 9/4/2019     Sig - Route: Take 15 application by mouth Every 15 (Fifteen) Minutes As Needed for Low Blood Sugar (Blood sugar less than 70). - Oral    dorzolamide (TRUSOPT) 2 % ophthalmic solution 1 drop 1 drop 3 Times Daily 9/4/2019     Sig - Route: Administer 1 drop into the left eye 3 (Three) Times a Day. - Left Eye    doxycycline (VIBRAMYCIN) 100 mg/100 mL 0.9% NS  mg Every 12 Hours 9/5/2019 9/12/2019    Sig - Route: Infuse 100 mL into a venous catheter Every 12 (Twelve) Hours. - Intravenous    Linked Group 2:  \"And\" Linked Group Details        doxycycline (VIBRAMYCIN) 100 mg/100 mL 0.9% NS  mg Once 9/4/2019 9/4/2019    Sig - Route: Infuse 100 mL into a venous catheter 1 (One) Time. - Intravenous    enoxaparin (LOVENOX) syringe 30 mg 30 mg Every 24 Hours 9/4/2019     Sig - Route: Inject 0.3 mL under the skin into the appropriate area as directed Daily. - Subcutaneous    famotidine (PEPCID) tablet 20 mg 20 mg Daily 9/4/2019     Sig - Route: Take 1 tablet by mouth Daily. - Oral    fluticasone (FLONASE) 50 MCG/ACT nasal spray 2 spray 2 spray Daily 9/4/2019     Sig - Route: 2 sprays by Each Nare route Daily. - Each Nare    glucagon (human recombinant) (GLUCAGEN DIAGNOSTIC) injection 1 mg 1 mg Every 15 Minutes PRN 9/4/2019     Sig - Route: Inject 1 mg under the skin into the appropriate area as directed Every 15 (Fifteen) Minutes As Needed for Low Blood Sugar (Blood Glucose Less Than 70). - Subcutaneous    guaiFENesin (MUCINEX) 12 hr tablet 1,200 mg 1,200 mg Every 12 Hours Scheduled 9/4/2019     Sig - Route: Take 2 tablets by mouth Every 12 (Twelve) Hours. - Oral    HOG enema 360 mL 360 mL Once 9/4/2019     Sig - Route: Insert 360 mL into the rectum 1 (One) Time. - Rectal    insulin detemir (LEVEMIR) injection 10 Units 10 Units Nightly 9/4/2019     Sig - Route: Inject 10 Units under the skin into the appropriate area as directed " Every Night. - Subcutaneous    insulin lispro (humaLOG) injection 2-7 Units 2-7 Units 4 Times Daily With Meals & Nightly 9/4/2019     Sig - Route: Inject 2-7 Units under the skin into the appropriate area as directed 4 (Four) Times a Day With Meals & at Bedtime. - Subcutaneous    ipratropium-albuterol (DUO-NEB) nebulizer solution 3 mL 3 mL Once 9/3/2019 9/3/2019    Sig - Route: Take 3 mL by nebulization 1 (One) Time. - Nebulization    Cosign for Ordering: Accepted by Brian Vivas MD on 9/4/2019  7:33 AM    ipratropium-albuterol (DUO-NEB) nebulizer solution 3 mL 3 mL Every 4 Hours - RT 9/4/2019     Sig - Route: Take 3 mL by nebulization Every 4 (Four) Hours. - Nebulization    ipratropium-albuterol (DUO-NEB) nebulizer solution 3 mL 3 mL Every 4 Hours PRN 9/4/2019     Sig - Route: Take 3 mL by nebulization Every 4 (Four) Hours As Needed for Shortness of Air. - Nebulization    latanoprost (XALATAN) 0.005 % ophthalmic solution 1 drop 1 drop Nightly 9/4/2019     Sig - Route: Administer 1 drop into the left eye Every Night. - Left Eye    lidocaine-prilocaine (EMLA) 2.5-2.5 % cream  As Needed 9/4/2019     Sig - Route: Apply  topically to the appropriate area as directed As Needed for Mild Pain  or Injection Site Pain. - Topical    losartan (COZAAR) tablet 100 mg 100 mg Daily 9/4/2019     Sig - Route: Take 2 tablets by mouth Daily. - Oral    methylPREDNISolone sodium succinate (SOLU-Medrol) injection 125 mg 125 mg Once 9/3/2019 9/3/2019    Sig - Route: Infuse 2 mL into a venous catheter 1 (One) Time. - Intravenous    Cosign for Ordering: Accepted by Brian Vivas MD on 9/4/2019  7:33 AM    methylPREDNISolone sodium succinate (SOLU-Medrol) injection 40 mg 40 mg Every 6 Hours 9/4/2019     Sig - Route: Infuse 1 mL into a venous catheter Every 6 (Six) Hours. - Intravenous    midodrine (PROAMATINE) tablet 5 mg 5 mg Daily PRN 9/4/2019     Sig - Route: Take 2 tablets by mouth Daily As Needed (hemodialysis). - Oral  "   nicotine (NICODERM CQ) 21 MG/24HR patch 1 patch 1 patch Every 24 Hours 9/4/2019     Sig - Route: Place 1 patch on the skin as directed by provider Daily. - Transdermal    ondansetron (ZOFRAN) injection 4 mg 4 mg Once 9/3/2019 9/3/2019    Sig - Route: Infuse 2 mL into a venous catheter 1 (One) Time. - Intravenous    Cosign for Ordering: Accepted by Brian Vivas MD on 9/4/2019  7:33 AM    ondansetron (ZOFRAN) injection 4 mg 4 mg Every 6 Hours PRN 9/4/2019     Sig - Route: Infuse 2 mL into a venous catheter Every 6 (Six) Hours As Needed for Nausea or Vomiting. - Intravenous    Linked Group 3:  \"Or\" Linked Group Details        ondansetron (ZOFRAN) tablet 4 mg 4 mg Every 6 Hours PRN 9/4/2019     Sig - Route: Take 1 tablet by mouth Every 6 (Six) Hours As Needed for Nausea or Vomiting. - Oral    Linked Group 3:  \"Or\" Linked Group Details        pravastatin (PRAVACHOL) tablet 10 mg 10 mg Nightly 9/4/2019     Sig - Route: Take 0.5 tablets by mouth Every Night. - Oral    sodium chloride 0.9 % flush 10 mL 10 mL Every 12 Hours Scheduled 9/4/2019     Sig - Route: Infuse 10 mL into a venous catheter Every 12 (Twelve) Hours. - Intravenous    sodium chloride 0.9 % flush 10 mL 10 mL As Needed 9/4/2019     Sig - Route: Infuse 10 mL into a venous catheter As Needed for Line Care. - Intravenous    sodium chloride 0.9 % infusion 50 mL/hr Continuous 9/4/2019     Sig - Route: Infuse 50 mL/hr into a venous catheter Continuous. - Intravenous    timolol (TIMOPTIC) 0.5 % ophthalmic solution 1 drop 1 drop 3 Times Daily 9/4/2019     Sig - Route: Administer 1 drop into the left eye 3 (Three) Times a Day. - Left Eye    levoFLOXacin (LEVAQUIN) 750 mg/150 mL D5W (premix) (LEVAQUIN) 750 mg (Discontinued) 750 mg Once 9/3/2019 9/3/2019    Sig - Route: Infuse 150 mL into a venous catheter 1 (One) Time. - Intravenous    Cosign for Ordering: Accepted by Brian Vivas MD on 9/4/2019  7:33 AM          Lab Results (last 48 hours)     " Procedure Component Value Units Date/Time    Hemoglobin A1c [[id number]]  (Abnormal) Collected:  09/04/19 0532    Specimen:  Blood Updated:  09/04/19 0712     Hemoglobin A1C 6.3 %     Narrative:       Less than 6.0           Non-Diabetic Range  6.0-7.0                 ADA Therapeutic Target  Greater than 7.0        Action Suggested    T4, Free [[id number]]  (Normal) Collected:  09/04/19 0532    Specimen:  Blood Updated:  09/04/19 0647     Free T4 0.87 ng/dL     Comprehensive Metabolic Panel [[id number]]  (Abnormal) Collected:  09/04/19 0532    Specimen:  Blood Updated:  09/04/19 0642     Glucose 226 mg/dL      BUN 49 mg/dL      Creatinine 7.54 mg/dL      Sodium 140 mmol/L      Potassium 3.9 mmol/L      Chloride 100 mmol/L      CO2 21.0 mmol/L      Calcium 8.9 mg/dL      Total Protein 7.0 g/dL      Albumin 4.20 g/dL      ALT (SGPT) 18 U/L      AST (SGOT) 36 U/L      Alkaline Phosphatase 70 U/L      Total Bilirubin 0.9 mg/dL      eGFR Non African Amer 5 mL/min/1.73      Comment: <15 Indicative of kidney failure.        eGFR   Amer --     Comment: <15 Indicative of kidney failure.        Globulin 2.8 gm/dL      A/G Ratio 1.5 g/dL      BUN/Creatinine Ratio 6.5     Anion Gap 19.0 mmol/L     Narrative:       GFR Normal >60  Chronic Kidney Disease <60  Kidney Failure <15    Magnesium [[id number]]  (Normal) Collected:  09/04/19 0532    Specimen:  Blood Updated:  09/04/19 0638     Magnesium 2.2 mg/dL     Phosphorus [[id number]]  (Abnormal) Collected:  09/04/19 0532    Specimen:  Blood Updated:  09/04/19 0638     Phosphorus 6.0 mg/dL     CBC Auto Differential [[id number]]  (Abnormal) Collected:  09/04/19 0532    Specimen:  Blood Updated:  09/04/19 0621     WBC 7.18 10*3/mm3      RBC 3.94 10*6/mm3      Hemoglobin 12.5 g/dL      Hematocrit 37.8 %      MCV 95.9 fL      MCH 31.7 pg      MCHC 33.1 g/dL      RDW 14.5 %      RDW-SD 51.0 fl      MPV 9.4 fL      Platelets 197 10*3/mm3      Neutrophil % 93.1 %      Lymphocyte % 5.4 %       Monocyte % 1.1 %      Eosinophil % 0.0 %      Basophil % 0.1 %      Immature Grans % 0.3 %      Neutrophils, Absolute 6.68 10*3/mm3      Lymphocytes, Absolute 0.39 10*3/mm3      Monocytes, Absolute 0.08 10*3/mm3      Eosinophils, Absolute 0.00 10*3/mm3      Basophils, Absolute 0.01 10*3/mm3      Immature Grans, Absolute 0.02 10*3/mm3      nRBC 0.0 /100 WBC     Legionella Antigen, Urine - Urine, Urine, Clean Catch [[id number]]  (Normal) Collected:  09/04/19 0311    Specimen:  Urine, Clean Catch Updated:  09/04/19 0437     LEGIONELLA ANTIGEN, URINE Negative    S. Pneumo Ag Urine or CSF - Urine, Urine, Clean Catch [[id number]]  (Abnormal) Collected:  09/04/19 0311    Specimen:  Urine, Clean Catch Updated:  09/04/19 0437     Strep Pneumo Ag Positive    Narrative:       Streptococcus pneumoniae (pneumococcal pneumonia) vaccine may cause false-positive results, especially in patients who have received the vaccine within 5 days of having the test performed.    Urinalysis, Microscopic Only - Urine, Clean Catch [[id number]]  (Abnormal) Collected:  09/04/19 0311    Specimen:  Urine, Clean Catch Updated:  09/04/19 0408     RBC, UA 0-2 /HPF      WBC, UA 0-2 /HPF      Bacteria, UA Trace /HPF      Squamous Epithelial Cells, UA 0-2 /HPF      Hyaline Casts, UA None Seen /LPF      Methodology Manual Light Microscopy    Urinalysis With Culture If Indicated - Urine, Clean Catch [[id number]]  (Abnormal) Collected:  09/04/19 0311    Specimen:  Urine, Clean Catch Updated:  09/04/19 0358     Color, UA Yellow     Appearance, UA Clear     pH, UA 6.5     Specific Gravity, UA 1.020     Glucose,  mg/dL (Trace)     Ketones, UA 15 mg/dL (1+)     Bilirubin, UA Negative     Blood, UA Small (1+)     Protein, UA >=300 mg/dL (3+)     Leuk Esterase, UA Negative     Nitrite, UA Negative     Urobilinogen, UA 0.2 E.U./dL    POC Glucose Once [[id number]]  (Abnormal) Collected:  09/04/19 0252    Specimen:  Blood Updated:  09/04/19 0303     Glucose 170  mg/dL      Comment: : [id number] Chica ChelseaMeter ID: [id number]       Phosphorus [[id number]]  (Abnormal) Collected:  09/03/19 2214    Specimen:  Blood Updated:  09/04/19 0131     Phosphorus 5.9 mg/dL     aPTT [[id number]]  (Normal) Collected:  09/03/19 2214    Specimen:  Blood Updated:  09/03/19 2308     PTT 31.5 seconds     Protime-INR [[id number]]  (Normal) Collected:  09/03/19 2214    Specimen:  Blood Updated:  09/03/19 2308     Protime 13.0 Seconds      INR 0.95    TSH [[id number]]  (Normal) Collected:  09/03/19 2214    Specimen:  Blood Updated:  09/03/19 2306     TSH 1.410 uIU/mL     Troponin [[id number]]  (Normal) Collected:  09/03/19 2214    Specimen:  Blood Updated:  09/03/19 2249     Troponin I 0.033 ng/mL     BNP [[id number]]  (Abnormal) Collected:  09/03/19 2214    Specimen:  Blood Updated:  09/03/19 2249     proBNP 3,360.0 pg/mL     Comprehensive Metabolic Panel [[id number]]  (Abnormal) Collected:  09/03/19 2214    Specimen:  Blood Updated:  09/03/19 2237     Glucose 109 mg/dL      BUN 45 mg/dL      Creatinine 7.55 mg/dL      Sodium 143 mmol/L      Potassium 3.9 mmol/L      Chloride 98 mmol/L      CO2 25.0 mmol/L      Calcium 9.3 mg/dL      Total Protein 7.8 g/dL      Albumin 4.60 g/dL      ALT (SGPT) 20 U/L      AST (SGOT) 32 U/L      Alkaline Phosphatase 74 U/L      Total Bilirubin 1.0 mg/dL      eGFR Non African Amer 5 mL/min/1.73      Comment: <15 Indicative of kidney failure.        eGFR   Amer --     Comment: <15 Indicative of kidney failure.        Globulin 3.2 gm/dL      A/G Ratio 1.4 g/dL      BUN/Creatinine Ratio 6.0     Anion Gap 20.0 mmol/L     Narrative:       GFR Normal >60  Chronic Kidney Disease <60  Kidney Failure <15    Lipase [[id number]]  (Normal) Collected:  09/03/19 2214    Specimen:  Blood Updated:  09/03/19 2237     Lipase 194 U/L     Magnesium [[id number]]  (Abnormal) Collected:  09/03/19 2214    Specimen:  Blood Updated:  09/03/19 2237     Magnesium 2.3 mg/dL      Lactic Acid, Plasma [[id number]]  (Normal) Collected:  09/03/19 2214    Specimen:  Blood Updated:  09/03/19 2235     Lactate 1.3 mmol/L     Blood Culture - Blood, Arm, Right [[id number]] Collected:  09/03/19 2215    Specimen:  Blood from Arm, Right Updated:  09/03/19 2233    Blood Culture - Blood, Arm, Right [[id number]] Collected:  09/03/19 2113    Specimen:  Blood from Arm, Right Updated:  09/03/19 2232    CBC & Differential [[id number]] Collected:  09/03/19 2214    Specimen:  Blood Updated:  09/03/19 2225    Narrative:       The following orders were created for panel order CBC & Differential.  Procedure                               Abnormality         Status                     ---------                               -----------         ------                     CBC Auto Differential[[id number]]        Abnormal            Final result                 Please view results for these tests on the individual orders.    CBC Auto Differential [[id number]]  (Abnormal) Collected:  09/03/19 2214    Specimen:  Blood Updated:  09/03/19 2225     WBC 6.37 10*3/mm3      RBC 4.20 10*6/mm3      Hemoglobin 13.3 g/dL      Hematocrit 39.6 %      MCV 94.3 fL      MCH 31.7 pg      MCHC 33.6 g/dL      RDW 14.6 %      RDW-SD 50.7 fl      MPV 8.8 fL      Platelets 187 10*3/mm3      Neutrophil % 74.4 %      Lymphocyte % 20.1 %      Monocyte % 4.7 %      Eosinophil % 0.2 %      Basophil % 0.3 %      Immature Grans % 0.3 %      Neutrophils, Absolute 4.74 10*3/mm3      Lymphocytes, Absolute 1.28 10*3/mm3      Monocytes, Absolute 0.30 10*3/mm3      Eosinophils, Absolute 0.01 10*3/mm3      Basophils, Absolute 0.02 10*3/mm3      Immature Grans, Absolute 0.02 10*3/mm3      nRBC 0.0 /100 WBC           Lab Results (last 48 hours)     Procedure Component Value Units Date/Time    Hemoglobin A1c [[id number]]  (Abnormal) Collected:  09/04/19 0532    Specimen:  Blood Updated:  09/04/19 0712     Hemoglobin A1C 6.3 %     Narrative:       Less than 6.0            Non-Diabetic Range  6.0-7.0                 ADA Therapeutic Target  Greater than 7.0        Action Suggested    T4, Free [[id number]]  (Normal) Collected:  09/04/19 0532    Specimen:  Blood Updated:  09/04/19 0647     Free T4 0.87 ng/dL     Comprehensive Metabolic Panel [[id number]]  (Abnormal) Collected:  09/04/19 0532    Specimen:  Blood Updated:  09/04/19 0642     Glucose 226 mg/dL      BUN 49 mg/dL      Creatinine 7.54 mg/dL      Sodium 140 mmol/L      Potassium 3.9 mmol/L      Chloride 100 mmol/L      CO2 21.0 mmol/L      Calcium 8.9 mg/dL      Total Protein 7.0 g/dL      Albumin 4.20 g/dL      ALT (SGPT) 18 U/L      AST (SGOT) 36 U/L      Alkaline Phosphatase 70 U/L      Total Bilirubin 0.9 mg/dL      eGFR Non African Amer 5 mL/min/1.73      Comment: <15 Indicative of kidney failure.        eGFR   Amer --     Comment: <15 Indicative of kidney failure.        Globulin 2.8 gm/dL      A/G Ratio 1.5 g/dL      BUN/Creatinine Ratio 6.5     Anion Gap 19.0 mmol/L     Narrative:       GFR Normal >60  Chronic Kidney Disease <60  Kidney Failure <15    Magnesium [[id number]]  (Normal) Collected:  09/04/19 0532    Specimen:  Blood Updated:  09/04/19 0638     Magnesium 2.2 mg/dL     Phosphorus [[id number]]  (Abnormal) Collected:  09/04/19 0532    Specimen:  Blood Updated:  09/04/19 0638     Phosphorus 6.0 mg/dL     CBC Auto Differential [[id number]]  (Abnormal) Collected:  09/04/19 0532    Specimen:  Blood Updated:  09/04/19 0621     WBC 7.18 10*3/mm3      RBC 3.94 10*6/mm3      Hemoglobin 12.5 g/dL      Hematocrit 37.8 %      MCV 95.9 fL      MCH 31.7 pg      MCHC 33.1 g/dL      RDW 14.5 %      RDW-SD 51.0 fl      MPV 9.4 fL      Platelets 197 10*3/mm3      Neutrophil % 93.1 %      Lymphocyte % 5.4 %      Monocyte % 1.1 %      Eosinophil % 0.0 %      Basophil % 0.1 %      Immature Grans % 0.3 %      Neutrophils, Absolute 6.68 10*3/mm3      Lymphocytes, Absolute 0.39 10*3/mm3      Monocytes, Absolute 0.08 10*3/mm3       Eosinophils, Absolute 0.00 10*3/mm3      Basophils, Absolute 0.01 10*3/mm3      Immature Grans, Absolute 0.02 10*3/mm3      nRBC 0.0 /100 WBC     Legionella Antigen, Urine - Urine, Urine, Clean Catch [[id number]]  (Normal) Collected:  09/04/19 0311    Specimen:  Urine, Clean Catch Updated:  09/04/19 0437     LEGIONELLA ANTIGEN, URINE Negative    S. Pneumo Ag Urine or CSF - Urine, Urine, Clean Catch [[id number]]  (Abnormal) Collected:  09/04/19 0311    Specimen:  Urine, Clean Catch Updated:  09/04/19 0437     Strep Pneumo Ag Positive    Narrative:       Streptococcus pneumoniae (pneumococcal pneumonia) vaccine may cause false-positive results, especially in patients who have received the vaccine within 5 days of having the test performed.    Urinalysis, Microscopic Only - Urine, Clean Catch [[id number]]  (Abnormal) Collected:  09/04/19 0311    Specimen:  Urine, Clean Catch Updated:  09/04/19 0408     RBC, UA 0-2 /HPF      WBC, UA 0-2 /HPF      Bacteria, UA Trace /HPF      Squamous Epithelial Cells, UA 0-2 /HPF      Hyaline Casts, UA None Seen /LPF      Methodology Manual Light Microscopy    Urinalysis With Culture If Indicated - Urine, Clean Catch [[id number]]  (Abnormal) Collected:  09/04/19 0311    Specimen:  Urine, Clean Catch Updated:  09/04/19 0358     Color, UA Yellow     Appearance, UA Clear     pH, UA 6.5     Specific Gravity, UA 1.020     Glucose,  mg/dL (Trace)     Ketones, UA 15 mg/dL (1+)     Bilirubin, UA Negative     Blood, UA Small (1+)     Protein, UA >=300 mg/dL (3+)     Leuk Esterase, UA Negative     Nitrite, UA Negative     Urobilinogen, UA 0.2 E.U./dL    POC Glucose Once [[id number]]  (Abnormal) Collected:  09/04/19 0252    Specimen:  Blood Updated:  09/04/19 0303     Glucose 170 mg/dL      Comment: : [id number] [place]: [id number]       Phosphorus [[id number]]  (Abnormal) Collected:  09/03/19 2214    Specimen:  Blood Updated:  09/04/19 0131     Phosphorus 5.9 mg/dL     aPTT  [[id number]]  (Normal) Collected:  09/03/19 2214    Specimen:  Blood Updated:  09/03/19 2308     PTT 31.5 seconds     Protime-INR [[id number]]  (Normal) Collected:  09/03/19 2214    Specimen:  Blood Updated:  09/03/19 2308     Protime 13.0 Seconds      INR 0.95    TSH [[id number]]  (Normal) Collected:  09/03/19 2214    Specimen:  Blood Updated:  09/03/19 2306     TSH 1.410 uIU/mL     Troponin [[id number]]  (Normal) Collected:  09/03/19 2214    Specimen:  Blood Updated:  09/03/19 2249     Troponin I 0.033 ng/mL     BNP [[id number]]  (Abnormal) Collected:  09/03/19 2214    Specimen:  Blood Updated:  09/03/19 2249     proBNP 3,360.0 pg/mL     Comprehensive Metabolic Panel [[id number]]  (Abnormal) Collected:  09/03/19 2214    Specimen:  Blood Updated:  09/03/19 2237     Glucose 109 mg/dL      BUN 45 mg/dL      Creatinine 7.55 mg/dL      Sodium 143 mmol/L      Potassium 3.9 mmol/L      Chloride 98 mmol/L      CO2 25.0 mmol/L      Calcium 9.3 mg/dL      Total Protein 7.8 g/dL      Albumin 4.60 g/dL      ALT (SGPT) 20 U/L      AST (SGOT) 32 U/L      Alkaline Phosphatase 74 U/L      Total Bilirubin 1.0 mg/dL      eGFR Non African Amer 5 mL/min/1.73      Comment: <15 Indicative of kidney failure.        eGFR   Amer --     Comment: <15 Indicative of kidney failure.        Globulin 3.2 gm/dL      A/G Ratio 1.4 g/dL      BUN/Creatinine Ratio 6.0     Anion Gap 20.0 mmol/L     Narrative:       GFR Normal >60  Chronic Kidney Disease <60  Kidney Failure <15    Lipase [[id number]]  (Normal) Collected:  09/03/19 2214    Specimen:  Blood Updated:  09/03/19 2237     Lipase 194 U/L     Magnesium [[id number]]  (Abnormal) Collected:  09/03/19 2214    Specimen:  Blood Updated:  09/03/19 2237     Magnesium 2.3 mg/dL     Lactic Acid, Plasma [[id number]]  (Normal) Collected:  09/03/19 2214    Specimen:  Blood Updated:  09/03/19 2235     Lactate 1.3 mmol/L     Blood Culture - Blood, Arm, Right [[id number]] Collected:  09/03/19 2215    Specimen:   Blood from Arm, Right Updated:  09/03/19 2233    Blood Culture - Blood, Arm, Right [[id number]] Collected:  09/03/19 2113    Specimen:  Blood from Arm, Right Updated:  09/03/19 2232    CBC & Differential [[id number]] Collected:  09/03/19 2214    Specimen:  Blood Updated:  09/03/19 2225    Narrative:       The following orders were created for panel order CBC & Differential.  Procedure                               Abnormality         Status                     ---------                               -----------         ------                     CBC Auto Differential[[id number]]        Abnormal            Final result                 Please view results for these tests on the individual orders.    CBC Auto Differential [[id number]]  (Abnormal) Collected:  09/03/19 2214    Specimen:  Blood Updated:  09/03/19 2225     WBC 6.37 10*3/mm3      RBC 4.20 10*6/mm3      Hemoglobin 13.3 g/dL      Hematocrit 39.6 %      MCV 94.3 fL      MCH 31.7 pg      MCHC 33.6 g/dL      RDW 14.6 %      RDW-SD 50.7 fl      MPV 8.8 fL      Platelets 187 10*3/mm3      Neutrophil % 74.4 %      Lymphocyte % 20.1 %      Monocyte % 4.7 %      Eosinophil % 0.2 %      Basophil % 0.3 %      Immature Grans % 0.3 %      Neutrophils, Absolute 4.74 10*3/mm3      Lymphocytes, Absolute 1.28 10*3/mm3      Monocytes, Absolute 0.30 10*3/mm3      Eosinophils, Absolute 0.01 10*3/mm3      Basophils, Absolute 0.02 10*3/mm3      Immature Grans, Absolute 0.02 10*3/mm3      nRBC 0.0 /100 WBC           Orders (last 48 hrs)     Start     Ordered    09/05/19 0100  doxycycline (VIBRAMYCIN) 100 mg/100 mL 0.9% NS MBP  Every 12 Hours      09/04/19 0118 09/04/19 2200  cefTRIAXone (ROCEPHIN) 1 g/100 mL 0.9% NS (MBP)  Every 24 Hours      09/04/19 0118 09/04/19 0900  dorzolamide (TRUSOPT) 2 % ophthalmic solution 1 drop  3 Times Daily      09/04/19 0118 09/04/19 0900  brimonidine (ALPHAGAN) 0.2 % ophthalmic solution 1 drop  Daily      09/04/19 0118    09/04/19 0900   atropine 1 % ophthalmic solution 1 drop  2 Times Daily      09/04/19 0118    09/04/19 0900  timolol (TIMOPTIC) 0.5 % ophthalmic solution 1 drop  3 Times Daily      09/04/19 0118 09/04/19 0900  calcitriol (ROCALTROL) capsule 0.25 mcg  Daily      09/04/19 0118    09/04/19 0900  losartan (COZAAR) tablet 100 mg  Daily      09/04/19 0118    09/04/19 0900  famotidine (PEPCID) tablet 20 mg  Daily      09/04/19 0118 09/04/19 0900  aspirin EC tablet 81 mg  Daily      09/04/19 0118 09/04/19 0900  calcium carbonate (oyster shell) tablet 500 mg  3 Times Daily      09/04/19 0118 09/04/19 0900  fluticasone (FLONASE) 50 MCG/ACT nasal spray 2 spray  Daily      09/04/19 0118 09/04/19 0900  cetirizine (zyrTEC) tablet 10 mg  Daily      09/04/19 0118 09/04/19 0900  enoxaparin (LOVENOX) syringe 30 mg  Every 24 Hours      09/04/19 0118 09/04/19 0800  insulin lispro (humaLOG) injection 2-7 Units  4 Times Daily With Meals & Nightly      09/04/19 0118 09/04/19 0723  Hemodialysis Inpatient  Once      09/04/19 0729    09/04/19 0715  HOG enema 360 mL  Once      09/04/19 0629    09/04/19 0702  Inpatient Nephrology Consult  IN AM     Specialty:  Nephrology  Provider:  (Not yet assigned)    09/04/19 0118 09/04/19 0700  POC Glucose 4x Daily AC & at Bedtime  4 Times Daily Before Meals & at Bedtime      09/04/19 0118 09/04/19 0600  Incentive Spirometry  Every 4 Hours While Awake      09/04/19 0118 09/04/19 0600  Incentive Spirometry  Every 4 Hours While Awake      09/04/19 0118 09/04/19 0600  Comprehensive Metabolic Panel  Morning Draw      09/04/19 0118    09/04/19 0600  CBC Auto Differential  Morning Draw      09/04/19 0118 09/04/19 0600  Hemoglobin A1c  Morning Draw      09/04/19 0118 09/04/19 0600  T4, Free  Morning Draw      09/04/19 0118    09/04/19 0600  Magnesium  Morning Draw      09/04/19 0118    09/04/19 0600  Phosphorus  Morning Draw      09/04/19 0118    09/04/19 0400  Vital Signs  Every 4 Hours       09/04/19 0118    09/04/19 0400  methylPREDNISolone sodium succinate (SOLU-Medrol) injection 40 mg  Every 6 Hours      09/04/19 0118    09/04/19 0348  Urinalysis, Microscopic Only - Urine, Clean Catch  Once      09/04/19 0347    09/04/19 0330  ipratropium-albuterol (DUO-NEB) nebulizer solution 3 mL  Every 4 Hours - RT      09/04/19 0118    09/04/19 0304  POC Glucose Once  Once      09/04/19 0252    09/04/19 0300  nicotine (NICODERM CQ) 21 MG/24HR patch 1 patch  Every 24 Hours      09/04/19 0118    09/04/19 0215  insulin detemir (LEVEMIR) injection 10 Units  Nightly      09/04/19 0118    09/04/19 0215  latanoprost (XALATAN) 0.005 % ophthalmic solution 1 drop  Nightly      09/04/19 0118    09/04/19 0215  pravastatin (PRAVACHOL) tablet 10 mg  Nightly      09/04/19 0118    09/04/19 0215  sodium chloride 0.9 % flush 10 mL  Every 12 Hours Scheduled      09/04/19 0118    09/04/19 0215  sodium chloride 0.9 % infusion  Continuous      09/04/19 0118    09/04/19 0145  guaiFENesin (MUCINEX) 12 hr tablet 1,200 mg  Every 12 Hours Scheduled      09/04/19 0118    09/04/19 0128  Inpatient Consult to Advance Care Planning  Once     Comments:  Pt would like to start working on a health care directive   Provider:  (Not yet assigned)    09/04/19 0139    09/04/19 0119  Intake & Output  Every Shift      09/04/19 0118    09/04/19 0119  Weigh Patient  Once      09/04/19 0118    09/04/19 0119  Pulse Oximetry on Admit  Once      09/04/19 0118    09/04/19 0119  Tobacco Cessation Education  Once      09/04/19 0118    09/04/19 0119  Pneumonia Education  Prior to Discharge     Comments:  a    09/04/19 0118    09/04/19 0119  Notify Provider if Patient Requires Greater Than 4LPM of Oxygen  Until Discontinued      09/04/19 0118    09/04/19 0119  Do NOT Hold Basal or Correction Scale Insulin When Patient is NPO, Hold Scheduled Mealtime (Bolus) Insulin if NPO  Continuous      09/04/19 0118    09/04/19 0119  Follow [facility] Hypoglycemia Standing Orders  For Blood Glucose Less Than 70 mg/dL  Until Discontinued      09/04/19 0118 09/04/19 0119  Hypoglycemia Treatment - Alert Patient That is Not NPO and Can Safely Swallow  Until Discontinued     Comments:  Administer 4 oz Fruit Juice OR 4 oz Regular Soda OR 8 oz Milk OR 15-30 grams (1 tube) of Glucose Gel.  Recheck Blood Glucose 15 Minutes After Ingestion, Repeat Treatment & Continue to Recheck Blood Sugar Every 15 Minutes Until Blood Glucose is 70 mg/dL or Higher.  Once Blood Glucose is 70 mg/dL or Higher and if It Will Be more than 60 Minutes Until the Next Meal, Provide Appropriate Snack (Including Carbohydrate Food) Based on Meal Plan Order. Give Meal Tray As Soon As Possible.    09/04/19 0118 09/04/19 0119  Hypoglycemia Treatment - Patient Has IV Access - Unresponsive, NPO or Unable To Safely Swallow  Until Discontinued     Comments:  Administer 25g (50ml) D50W IV Push.  Recheck Blood Glucose 15 Minutes After Administration, if Blood Glucose Remains Less Than 70 mg/dl, Repeat Treatment   Recheck Blood Glucose 15 Minutes After 2nd Administration, if Blood Glucose Remains Less Than 70 mg/dL After 2nd Dose of D50W, Contact Provider for Further Treatment Orders & Consider Adding IVF With D5W for Maintenance    09/04/19 0118 09/04/19 0119  Hypoglycemia Treatment - Patient Without IV Access - Unresponsive, NPO or Unable To Safely Swallow  Until Discontinued     Comments:  Administer 1mg Glucagon SQ & Establish IV Access.  Turn Patient on Side - Nausea / Vomiting May Occur.  Recheck Blood Glucose 15 Minutes After Administration.  If Blood Glucose Remains Less Than 70, Administer 25g D50W IV Push (50ml).  Recheck Blood Glucose 15 Minutes After Administration of D50W, if Blood Glucose Remains Less Than 70 mg/dl, Contact Provider for Further Treatment Orders & Consider Adding IVF With D5 for Maintenance    09/04/19 0118 09/04/19 0119  Hypoglycemia Treatment - Document Event & Patient Response to Interventions  EMR, Document Medications on MAR  Continuous      09/04/19 0118    09/04/19 0119  Notify Provider - Hypoglycemia Treatment  Until Discontinued      09/04/19 0118    09/04/19 0119  Insert Peripheral IV  Once      09/04/19 0118    09/04/19 0119  Saline Lock & Maintain IV Access  Continuous      09/04/19 0118    09/04/19 0119  Pneumonia Finding Seen on CXR  Once      09/04/19 0118    09/04/19 0119  Cardiac Monitoring  Continuous      09/04/19 0118    09/04/19 0119  Pulse Oximetry, Continuous  Continuous      09/04/19 0118    09/04/19 0119  Activity - Ad Ana  Until Discontinued      09/04/19 0118    09/04/19 0119  Oxygen Therapy- Nasal Cannula; Titrate for SPO2: 90% - 95%  Continuous      09/04/19 0118    09/04/19 0119  Diet Regular; Cardiac, Consistent Carbohydrate, Renal  Diet Effective Now      09/04/19 0118    09/04/19 0119  Phosphorus  STAT      09/04/19 0118    09/04/19 0119  Respiratory Culture - Sputum, Cough  Once      09/04/19 0118    09/04/19 0119  Legionella Antigen, Urine - Urine, Urine, Clean Catch  Once      09/04/19 0118    09/04/19 0119  S. Pneumo Ag Urine or CSF - Urine, Urine, Clean Catch  Once      09/04/19 0118    09/04/19 0119  Tobacco Cessation Education  Prior to Discharge      09/04/19 0118    09/04/19 0118  dextrose (GLUTOSE) oral gel 15 g  Every 15 Minutes PRN      09/04/19 0118    09/04/19 0118  dextrose (D50W) 25 g/ 50mL Intravenous Solution 25 g  Every 15 Minutes PRN      09/04/19 0118    09/04/19 0118  glucagon (human recombinant) (GLUCAGEN DIAGNOSTIC) injection 1 mg  Every 15 Minutes PRN      09/04/19 0118    09/04/19 0118  sodium chloride 0.9 % flush 10 mL  As Needed      09/04/19 0118    09/04/19 0118  ondansetron (ZOFRAN) tablet 4 mg  Every 6 Hours PRN      09/04/19 0118    09/04/19 0118  ondansetron (ZOFRAN) injection 4 mg  Every 6 Hours PRN      09/04/19 0118    09/04/19 0118  acetaminophen (TYLENOL) tablet 650 mg  Every 4 Hours PRN      09/04/19 0118    09/04/19 0118  acetaminophen  (TYLENOL) 160 MG/5ML solution 650 mg  Every 4 Hours PRN      09/04/19 0118    09/04/19 0118  acetaminophen (TYLENOL) suppository 650 mg  Every 4 Hours PRN      09/04/19 0118    09/04/19 0118  ipratropium-albuterol (DUO-NEB) nebulizer solution 3 mL  Every 4 Hours PRN      09/04/19 0118    09/04/19 0117  benzonatate (TESSALON) capsule 100 mg  3 Times Daily PRN      09/04/19 0118    09/04/19 0117  lidocaine-prilocaine (EMLA) 2.5-2.5 % cream  As Needed      09/04/19 0118    09/04/19 0117  midodrine (PROAMATINE) tablet 5 mg  Daily PRN      09/04/19 0118    09/04/19 0042  doxycycline (VIBRAMYCIN) 100 mg/100 mL 0.9% NS MBP  Once      09/04/19 0040    09/03/19 2354  Code Status and Medical Interventions:  Continuous      09/04/19 0013    09/03/19 2317  Inpatient Admission  Once      09/03/19 2316    09/03/19 2222  cefTRIAXone (ROCEPHIN) 1 g/100 mL 0.9% NS (MBP)  Once      09/03/19 2220 09/03/19 2137  ondansetron (ZOFRAN) injection 4 mg  Once      09/03/19 2136 09/03/19 2137  methylPREDNISolone sodium succinate (SOLU-Medrol) injection 125 mg  Once      09/03/19 2136 09/03/19 2137  ipratropium-albuterol (DUO-NEB) nebulizer solution 3 mL  Once      09/03/19 2136 09/03/19 2136  levoFLOXacin (LEVAQUIN) 750 mg/150 mL D5W (premix) (LEVAQUIN) 750 mg  Once,   Status:  Discontinued      09/03/19 2136 09/03/19 2136  CBC & Differential  Once      09/03/19 2136 09/03/19 2136  Comprehensive Metabolic Panel  Once      09/03/19 2136 09/03/19 2136  aPTT  Once      09/03/19 2136 09/03/19 2136  Protime-INR  Once      09/03/19 2136 09/03/19 2136  Lipase  Once      09/03/19 2136 09/03/19 2136  Urinalysis With Culture If Indicated - Urine, Clean Catch  Once      09/03/19 2136 09/03/19 2136  Troponin  Once      09/03/19 2136 09/03/19 2136  BNP  Once      09/03/19 2136 09/03/19 2136  Blood Culture - Blood,  Once      09/03/19 2136 09/03/19 2136  Blood Culture - Blood,  Once      09/03/19 2136 09/03/19  2136  Lactic Acid, Plasma  Once      09/03/19 2136    09/03/19 2136  TSH  Once      09/03/19 2136    09/03/19 2136  Magnesium  Once      09/03/19 2136    09/03/19 2136  ECG 12 Lead  Once      09/03/19 2136 09/03/19 2136  XR Chest 1 View  1 Time Imaging      09/03/19 2136    09/03/19 2136  CT Abdomen Pelvis Without Contrast  1 Time Imaging      09/03/19 2136    09/03/19 2136  CBC Auto Differential  PROCEDURE ONCE      09/03/19 2136    Unscheduled  Blood Gas, Arterial  As Needed     Comments:  Respiratory Distress      09/04/19 0118    --  SCANNED - TELEMETRY        09/03/19 0000        Ventilator/Non-Invasive Ventilation Settings (From admission, onward)    None        Physician Progress Notes (last 7 days) (Notes from 8/28/2019  7:54 AM through 9/4/2019  7:54 AM)     No notes of this type exist for this encounter.

## 2019-09-04 NOTE — ED NOTES
PATIENT AMBULATES TO BATHROOM, GAIT STEADY, STATES SHE MISSED THE CUP FOR URINE SAMPLE.  PATIENTS  STATES HE WILL BRING PATIENTS MEDICATION LIST IN THE AM.       Lexi Fitzpatrick RN  09/04/19 0045

## 2019-09-04 NOTE — ED PROVIDER NOTES
Subjective   History of Present Illness  59-year-old female presents with a chief complaint cough, shortness of breath, nausea.  Patient reports symptoms have been present 2 weeks progressive and getting worse.  No abdominal pain.  She reports low-grade fevers at home.  No diarrhea.  No BM for 1 week  Review of Systems   All other systems reviewed and are negative.      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    • Chronic otitis media    • Diabetes (CMS/HCC)    • Eustachian tube dysfunction    • Glaucoma    • HTN (hypertension)    • Kidney failure     on dialysis   • Liver disorder    • Mucoid otitis media        Allergies   Allergen Reactions   • Bupropion Nausea Only   • Chantix [Varenicline]    • Sulfa Antibiotics    • Azithromycin Rash   • Levofloxacin Rash   • Penicillins Rash       Past Surgical History:   Procedure Laterality Date   • ARTERIOVENOUS FISTULA     • CATARACT EXTRACTION WITH INTRAOCULAR LENS IMPLANT     •  SECTION     • CHOLECYSTECTOMY     • MYRINGOTOMY W/ TUBES Bilateral    • MYRINGOTOMY W/ TUBES Right    • TUBAL ABDOMINAL LIGATION         Family History   Problem Relation Age of Onset   • Heart disease Mother        Social History     Socioeconomic History   • Marital status:      Spouse name: Not on file   • Number of children: Not on file   • Years of education: Not on file   • Highest education level: Not on file   Tobacco Use   • Smoking status: Former Smoker     Packs/day: 2.00     Types: Cigarettes     Last attempt to quit: 10/27/2017     Years since quittin.8   • Smokeless tobacco: Never Used   Substance and Sexual Activity   • Alcohol use: No   • Drug use: No   • Sexual activity: Defer           Objective   Physical Exam   Constitutional: She is oriented to person, place, and time. She appears well-developed and well-nourished.   HENT:   Head: Normocephalic and atraumatic.   Eyes: EOM are normal. Pupils are equal,  round, and reactive to light.   Neck: Normal range of motion. Neck supple.   Cardiovascular: Normal rate and regular rhythm.   Pulmonary/Chest: Effort normal. She has wheezes. She has rales.   Abdominal: Soft. Bowel sounds are normal. She exhibits no distension. There is no tenderness.   Musculoskeletal: Normal range of motion.   Neurological: She is alert and oriented to person, place, and time. No cranial nerve deficit. Coordination normal.   Skin: Skin is warm. Capillary refill takes less than 2 seconds.   Psychiatric: She has a normal mood and affect. Her behavior is normal.   Nursing note and vitals reviewed.      Procedures           ED Course  Final Diagnosis: as of Sep 03 2333   Pneumonia due to infectious organism, unspecified laterality, unspecified part of lung   Chronic kidney disease, unspecified CKD stage      Labs Reviewed   COMPREHENSIVE METABOLIC PANEL - Abnormal; Notable for the following components:       Result Value    Glucose 109 (*)     BUN 45 (*)     Creatinine 7.55 (*)     eGFR Non African Amer 5 (*)     BUN/Creatinine Ratio 6.0 (*)     Anion Gap 20.0 (*)     All other components within normal limits    Narrative:     GFR Normal >60  Chronic Kidney Disease <60  Kidney Failure <15   BNP (IN-HOUSE) - Abnormal; Notable for the following components:    proBNP 3,360.0 (*)     All other components within normal limits   MAGNESIUM - Abnormal; Notable for the following components:    Magnesium 2.3 (*)     All other components within normal limits   CBC WITH AUTO DIFFERENTIAL - Abnormal; Notable for the following components:    Monocyte % 4.7 (*)     Eosinophil % 0.2 (*)     All other components within normal limits   APTT - Normal   PROTIME-INR - Normal   LIPASE - Normal   TROPONIN (IN-HOUSE) - Normal   LACTIC ACID, PLASMA - Normal   TSH - Normal   BLOOD CULTURE   BLOOD CULTURE   URINALYSIS W/ CULTURE IF INDICATED   CBC AND DIFFERENTIAL    Narrative:     The following orders were created for panel  order CBC & Differential.  Procedure                               Abnormality         Status                     ---------                               -----------         ------                     CBC Auto Differential[[id number]]        Abnormal            Final result                 Please view results for these tests on the individual orders.     CT Abdomen Pelvis Without Contrast   Final Result      XR Chest 1 View   Final Result                    MDM  Number of Diagnoses or Management Options  Chronic kidney disease, unspecified CKD stage: new and requires workup  Pneumonia due to infectious organism, unspecified laterality, unspecified part of lung: new and requires workup  Diagnosis management comments: PNA, admit to hospitalist        Amount and/or Complexity of Data Reviewed  Clinical lab tests: reviewed and ordered  Tests in the radiology section of CPT®: ordered and reviewed  Tests in the medicine section of CPT®: ordered and reviewed  Decide to obtain previous medical records or to obtain history from someone other than the patient: yes    Risk of Complications, Morbidity, and/or Mortality  Presenting problems: moderate  Diagnostic procedures: moderate  Management options: moderate    Patient Progress  Patient progress: stable               Gomez Romeo PA-C  09/03/19 0231

## 2019-09-04 NOTE — PLAN OF CARE
Problem: Patient Care Overview  Goal: Plan of Care Review   09/04/19 0451   Coping/Psychosocial   Plan of Care Reviewed With patient   Plan of Care Review   Progress no change   OTHER   Outcome Summary Pt A&O X 4. No c/o pain. Admitted from ED with pneumonia. Urine positive for strep pneumo. Pt on RA. VSS. Safety maintained. Will continue to monitor.       Problem: Pneumonia (Adult)  Goal: Signs and Symptoms of Listed Potential Problems Will be Absent, Minimized or Managed (Pneumonia)  Outcome: Ongoing (interventions implemented as appropriate)   09/04/19 0451   Goal/Outcome Evaluation   Problems Assessed (Pneumonia) all   Problems Present (Pneumonia) respiratory compromise;infection progression

## 2019-09-04 NOTE — PROGRESS NOTES
[facility] Medicine Services  INPATIENT PROGRESS NOTE    Length of Stay: 1  Date of Admission: 9/3/2019  Primary Care Physician: Ivan Kelly MD    Subjective   Chief Complaint: Cough, short of breath, nausea, vomiting, chills  HPI   Patient reports cough of green sputum, shortness of breath, decreased appetite, nausea with vomiting, chills that started after last dialysis treatment on Saturday.  She reports no bowel movement in the past 7 to 10 days.  She continues to have cough.  She had nausea earlier but no vomiting.  She just returned from dialysis.   is present in the room.  Chest x-ray showed linear infiltrate involving the lingula left lung.  No oxygen in use.  She denies chest pain, palpitations or leg swelling.    Review of Systems   Constitutional: Positive for appetite change, chills, fatigue and fever. Negative for unexpected weight change.   HENT: Negative for congestion and trouble swallowing.    Eyes: Negative for photophobia and visual disturbance.   Respiratory: Positive for cough and shortness of breath.    Cardiovascular: Negative for chest pain, palpitations and leg swelling.   Gastrointestinal: Positive for constipation, nausea and vomiting. Negative for abdominal distention, abdominal pain and diarrhea.   Endocrine: Negative for cold intolerance, heat intolerance and polyuria.   Genitourinary: Negative for dysuria, hematuria and urgency.   Musculoskeletal: Positive for myalgias.   Skin: Negative for rash and wound.   Neurological: Positive for weakness.   Hematological: Negative for adenopathy. Does not bruise/bleed easily.   Psychiatric/Behavioral: Negative for agitation, behavioral problems and confusion.      All pertinent negatives and positives are as above. All other systems have been reviewed and are negative unless otherwise stated.     Objective    Temp:  [97.8 °F (36.6 °C)-99.7 °F (37.6 °C)] 99.1 °F (37.3 °C)  Heart Rate:   [] 111  Resp:  [16-18] 18  BP: (146-187)/(55-90) 155/74  Physical Exam   Constitutional: She is oriented to person, place, and time. She appears well-developed and well-nourished.   Sitting up in bed.  No acute distress   HENT:   Head: Normocephalic and atraumatic.   Eyes: EOM are normal. Pupils are equal, round, and reactive to light.   Neck: Normal range of motion. Neck supple.   Cardiovascular: Normal rate, regular rhythm and intact distal pulses. Exam reveals no gallop and no friction rub.   Murmur heard.  Normal sinus rhythm 95-1 04 on telemetry   Pulmonary/Chest: No respiratory distress. She has no wheezes. She has no rales.   No oxygen in use.  Rhonchi throughout posterior right greater than left   Abdominal: Soft. Bowel sounds are normal. She exhibits no distension. There is no tenderness.   Genitourinary:   Genitourinary Comments: Voiding.  Still makes large volume of urine.   Musculoskeletal: Normal range of motion. She exhibits no edema.   AV fistula left upper extremity.  Good palpable thrill   Neurological: She is alert and oriented to person, place, and time.   Skin: Skin is warm and dry.   Psychiatric: She has a normal mood and affect. Her behavior is normal. Judgment and thought content normal.     Results Review:  I have reviewed the labs, radiology results, and diagnostic studies.    Laboratory Data:   Results from last 7 days   Lab Units 09/04/19  0532 09/03/19  2214   WBC 10*3/mm3 7.18 6.37   HEMOGLOBIN g/dL 12.5 13.3   HEMATOCRIT % 37.8 39.6   PLATELETS 10*3/mm3 197 187        Results from last 7 days   Lab Units 09/04/19  0532 09/03/19  2214   SODIUM mmol/L 140 143   POTASSIUM mmol/L 3.9 3.9   CHLORIDE mmol/L 100 98   CO2 mmol/L 21.0* 25.0   BUN mg/dL 49* 45*   CREATININE mg/dL 7.54* 7.55*   CALCIUM mg/dL 8.9 9.3   BILIRUBIN mg/dL 0.9 1.0   ALK PHOS U/L 70 74   ALT (SGPT) U/L 18 20   AST (SGOT) U/L 36 32   GLUCOSE mg/dL 226* 109*        Imaging Results (all)     Procedure Component Value  Units Date/Time    CT Abdomen Pelvis Without Contrast [[id number]] Collected:  09/03/19 2158     Updated:  09/03/19 2204    Narrative:       EXAMINATION: CT ABDOMEN PELVIS WO CONTRAST-      9/3/2019 9:51 PM CDT     HISTORY: Abdominal pain with nausea and vomiting.     In order to have a CT radiation dose as low as reasonably achievable  Automated Exposure Control was utilized for adjustment of the mA and/or  KV according to patient size.     DLP in mGycm= 255.     Noncontrast abdomen/pelvis CT.     No renal or ureteral stone.  No hydronephrosis.     Linear band of infiltrate along left heart border is incompletely  visualized on this abdomen/pelvis CT the head the appearance of  pneumonia on chest x-ray.  Normal heart size.  The right lower lobe is clear.     Cholecystectomy clips are present.  Normal noncontrast appearance of the liver, pancreas, and spleen.  Normal and symmetric adrenal glands.     Aortic calcification with no aneurysm.     No bowel obstruction.  No appendicitis, diverticulitis, or colitis.     No pelvic mass or fluid.     Summary:  1. Findings suspicious for lingular infiltrate along the left heart  border. This was better visualized on the recent chest x-ray.  2. No mass, fluid collection, or inflammatory process is seen within the  abdomen pelvis.      This report was finalized on 09/03/2019 22:01 by Dr. Dharmesh Zuleta MD.    XR Chest 1 View [[id number]] Collected:  09/03/19 2144     Updated:  09/03/19 2150    Narrative:       EXAMINATION: XR CHEST 1 VW-     9/3/2019 9:37 PM CDT     HISTORY: Short of breath.     One view chest x-ray compared with 06/08/2006.     Patchy infiltrate along the left heart border probably involves the  lingula.     The upper lobes are clear.     There is no pneumothorax or heart failure.     Summary:  1. Left-sided pneumonia along the left heart border probably within a  lingular segment of the left upper lobe.        This report was finalized on 09/03/2019 21:47 by   Dharmesh Zuleta MD.        Intake/Output    Intake/Output Summary (Last 24 hours) at 9/4/2019 1319  Last data filed at 9/4/2019 1125  Gross per 24 hour   Intake 300 ml   Output 800 ml   Net -500 ml     Scheduled Meds    aspirin 81 mg Oral Daily   atropine 1 drop Left Eye BID   brimonidine 1 drop Left Eye Daily   calcitriol 0.25 mcg Oral Daily   calcium carbonate (oyster shell) 500 mg Oral TID   ceftriaxone 1 g Intravenous Q24H   cetirizine 10 mg Oral Daily   dorzolamide 1 drop Left Eye TID   enoxaparin 30 mg Subcutaneous Q24H   famotidine 20 mg Oral Daily   fluticasone 2 spray Each Nare Daily   guaiFENesin 1,200 mg Oral Q12H   insulin detemir 10 Units Subcutaneous Nightly   insulin lispro 2-7 Units Subcutaneous 4x Daily With Meals & Nightly   ipratropium-albuterol 3 mL Nebulization Q4H - RT   latanoprost 1 drop Left Eye Nightly   losartan 100 mg Oral Daily   methylPREDNISolone sodium succinate 40 mg Intravenous Q6H   nicotine 1 patch Transdermal Q24H   pravastatin 10 mg Oral Nightly   sodium chloride 10 mL Intravenous Q12H   timolol 1 drop Left Eye TID     I have reviewed the patient current medications.     Assessment/Plan     Active Hospital Problems    Diagnosis   • **Pneumonia of left upper lobe due to Streptococcus (CMS/[facility])   • Type 2 diabetes mellitus, with long-term current use of insulin (CMS/[facility])   • End stage renal failure on dialysis (CMS/[facility])   • Diabetic nephropathy (CMS/[facility])   • Glaucoma   • HTN (hypertension)      Plan:  1.  Received Rocephin, doxycycline, DuoNeb, Solu-Medrol in ER.  2.  Doxycycline IV, Rocephin IV day 2.  Discontinue doxycycline.  Continue Rocephin.  Discussed with pharmacy as urine antigen positive for strep pneumonia.  3.  Strep pneumonia positive, Legionella negative, blood cultures in progress.  4.  Sputum culture  5.  Mucinex 1200 mg twice daily  6.  DuoNeb's every 4 hours  7.  Iincentive spirometry every 2-4 hours while awake  8.  Solu-Medrol 40 mg IV every 6 hours.  9.   Nephrology consulted for inpatient dialysis.  Had dialysis today.  Normal dialysis day Tuesday, Thursday, Saturday  10.  Home medications reviewed and resumed if appropriate.  11.  Lovenox at renal dose for deep vein thrombosis prophylaxis.  12.  Glucoses 135, 226, 109.  A1c 6.3. Home dose of insulin resumed.  13.  No labs tomorrow.  WBC 7.18.  Had dialysis today.    Her  will act as her surrogate decision-maker  The above documentation resulted from a face-to-face encounter by me Roxie CRUZ, [facility].    Discharge Planning: I expect patient to be discharged to home in 2-3 days.    ANTHONY Carter   09/04/19   1:19 PM

## 2019-09-04 NOTE — PROGRESS NOTES
Continued Stay Note  [facility]     Patient Name: Mini Gentile  MRN: [id number]  Today's Date: 9/4/2019    Admit Date: 9/3/2019    Discharge Plan     Row Name 09/04/19 1356       Plan    Plan Comments  AGREES TO PURCHASE [facility] DEPARTMENT PROGRAM H/P SENT CONSENT SIGNED [place] NOTIFIED        Discharge Codes    No documentation.             Kary Mansfield RN

## 2019-09-04 NOTE — ED NOTES
PATIENT ADMITTED TO ROOM 495, REPORT TO BE GIVEN AT BEDSIDE.      Lexi Fitzpatrick, RN  09/04/19 0048

## 2019-09-04 NOTE — PLAN OF CARE
Problem: Patient Care Overview  Goal: Plan of Care Review  Outcome: Ongoing (interventions implemented as appropriate)   09/04/19 2459   Coping/Psychosocial   Plan of Care Reviewed With patient   Plan of Care Review   Progress improving   OTHER   Outcome Summary Patient has no compaints of pain. Patient reports no BMx10 days. HOG enema ordered and given, results adequate. Patient up ad ramiro, safety maintained, vss, will follow.       Problem: Pneumonia (Adult)  Goal: Signs and Symptoms of Listed Potential Problems Will be Absent, Minimized or Managed (Pneumonia)  Outcome: Ongoing (interventions implemented as appropriate)

## 2019-09-04 NOTE — DISCHARGE PLACEMENT REQUEST
"[facility]  BENJY TREVINO RN CM [id number]      VamshihiMini mariscal (59 y.o. Female)     Date of Birth Social Security Number Address Home Phone MRN    1960  [address] [place] [id number] [phone] [id number]    Shinto Marital Status          Other        Admission Date Admission Type Admitting Provider Attending Provider Department, Room/Bed    9/3/19 Emergency Joseph Dempsey DO Robinson, Maurice S, DO [address], 495/1    Discharge Date Discharge Disposition Discharge Destination                       Attending Provider:  Joseph Dempsey DO    Allergies:  Bupropion, Chantix [Varenicline], Sulfa Antibiotics, Azithromycin, Levofloxacin, Penicillins    Isolation:  None   Infection:  None   Code Status:  CPR    Ht:  149.9 cm (59\")   Wt:  61.7 kg (136 lb 0.4 oz)    Admission Cmt:  None   Principal Problem:  Pneumonia of left upper lobe due to Streptococcus (CMS/[facility]) [J15.3]                 Active Insurance as of 9/3/2019     Primary Coverage     Payor Plan Insurance Group Employer/Plan Group    MEDICARE MEDICARE A & B      Payor Plan Address Payor Plan Phone Number Payor Plan Fax Number Effective Dates    [address] [phone]  7/1/2017 - None Entered    [facility] [id number]       Subscriber Name Subscriber Birth Date Member ID       MINI PEÑA 1960 [id number]           Secondary Coverage     Payor Plan Insurance Group Employer/Plan Group    [employer] [place]ICA[place]      Payor Plan Address Payor Plan Phone Number Payor Plan Fax Number Effective Dates    [address] [address] [phone]  11/4/2016 - None Entered    [facility] [id number]       Subscriber Name Subscriber Birth Date Member ID       MINI PEÑA 1960 [id number]                 Emergency Contacts      (Rel.) Home Phone Work Phone Mobile Phone    Erica Bowling (Daughter) -- -- [phone]               History & Physical      Donte, " Tejinder WILHELM MD at 9/3/2019 11:30 PM              [facility] Medicine Services  HISTORY AND PHYSICAL    Date of Admission: 9/3/2019  Primary Care Physician: Ivan Kelly MD    Subjective     Chief Complaint: Cough nausea vomiting    History of Present Illness  Patient is a 59-year-old who presents to the emergency room with a one-week history of cough productive of green sputum nausea shortness of breath.  She also complains that she has had a bowel movement in a week.  She was evaluated in the ER found to have on chest x-ray a linear infiltrate probably involving the lingula left lung.  Her problems are further complicated by the fact that she has end-stage renal disease on hemodialysis as well as type 2 diabetes.  She also still smokes 2 packs a day.  CT scan of her abdomen is unremarkable for acute problems except for the infiltrate seen consistent with the pneumonia on chest x-ray.  Her other problems include glaucoma she is blind in the right eye.  We have been asked to admit her for further evaluation and management of pneumonia.        Review of Systems   14 point review of systems negative except for as per HPI    Otherwise complete ROS reviewed and negative except as mentioned in the HPI.    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 Her smoking use included cigarettes. She smoked 2.00 packs per day. She has never used smokeless tobacco. She reports that she does not drink alcohol or use drugs.    Family History: family history includes Heart disease in her mother.       Allergies:  Allergies   Allergen Reactions   • Bupropion Nausea Only   • Chantix [Varenicline]    • Sulfa Antibiotics    • Azithromycin Rash   • Levofloxacin Rash   • Penicillins Rash     Medications:  Prior to Admission medications    Medication Sig Start Date End Date Taking? Authorizing Provider   aspirin 81 MG EC tablet Take 81 mg by mouth Daily.    tSeve Warren MD   benzonatate (TESSALON) 100 MG capsule Take 1 capsule by mouth 3 (Three) Times a Day As Needed for Cough. 4/1/17   Ivy Jernigan APRN   bimatoprost (LUMIGAN) 0.01 % ophthalmic drops Administer 1 drop into the left eye Every Night.    Steve Warren MD   brimonidine (ALPHAGAN) 0.2 % ophthalmic solution Administer 1 drop into the left eye Daily.    Steev Warren MD   calcitriol (ROCALTROL) 0.25 MCG capsule Take 0.25 mcg by mouth Daily.    Steve Warren MD   calcium carbonate (TUMS) 500 MG chewable tablet Chew 1 tablet 3 (Three) Times a Day.    Emergency, Nurse Epic, RN   Cetirizine HCl 10 MG capsule Take 10 mg by mouth.    Steve Warren MD   dorzolamide (TRUSOPT) 2 % ophthalmic solution Administer 1 drop into the left eye 2 (Two) Times a Day.    Steve Warren MD   fluticasone (FLONASE) 50 MCG/ACT nasal spray SHAKE LQ AND U 2 SPRAYS IEN D 4/28/17   Steve Warren MD   homatropine 5 % ophthalmic solution Administer 1 drop into the left eye Daily.    Steve Warren MD   insulin glargine (LANTUS) 100 UNIT/ML injection Inject 10 Units under the skin Every Night.    Steev Warren MD   lidocaine-prilocaine (EMLA) 2.5-2.5 % cream APPLY TO ACCESS AREA 30 MINUTES PRIOR TO HEMODIALYSIS THREE TIMES WEEKLY 4/21/17   Steve Warren MD  "  losartan (COZAAR) 100 MG tablet Take 100 mg by mouth Daily.    Steve Warren MD   midodrine (PROAMATINE) 5 MG tablet TK 1 T PO HALF WAY THROUGH TREATMENT AS DIRECTED 10/17/17   Steve Warren MD   pravastatin (PRAVACHOL) 10 MG tablet Take 10 mg by mouth Every Night.    Steve Warren MD   raNITIdine (ZANTAC) 150 MG tablet Take 150 mg by mouth 2 (Two) Times a Day.    Steve Warren MD   timolol (TIMOPTIC-XR) 0.5 % ophthalmic gel-forming Administer 1 drop into the left eye 3 (Three) Times a Day.    Steve Warren MD     Objective     Vital Signs: BP (!) 187/90   Pulse 91   Temp 97.8 °F (36.6 °C)   Resp 16   Ht 149.9 cm (59\")   Wt 60.8 kg (134 lb)   SpO2 94%   BMI 27.06 kg/m²    Physical Exam  Gen.:  Well-nourished well-developed white female in no acute distress  HEENT: Atraumatic, normocephalic.  Pupils equal, round, and reactive to light. Extraocular movements intact.  Sclerae anicteric.  TMs negative.  Mucous membranes moist.  Neck is supple without lymphadenopathy.  No JVD is noted.  No carotid bruits are auscultated.  Oropharynx is without erythema or exudate.   Chest: Rhonchi diffusely both lungs mild wheezes.  CV: Regular rate and rhythm.  Normal S1-S2.  No gallops, murmurs. or rubs.  Abdomen: Soft, nontender, nondistended.  Active bowel sounds,  No hepatosplenomegaly.  No masses.  No hernias.  Extremities: No clubbing, edema, or cyanosis.  Capillary refill is normal.  Pulses are 2+ and symmetric at radial and dorsalis pedis.  Posterior tibial pulses are intact and 2+ palpable.  Neuro: Patient is awake, alert, and oriented ×3.  Cranial nerves II through XII are grossly intact.  Motor is 5 out of 5 bilaterally.  DTRs are 2+ and symmetric bilaterally. Sensory exam is nonfocal  Skin: Warm, dry, and intact.  No evidence of breakdown.  Sensorium: Normal thought and affect    Nursing notes and vital signs reviewed        Results Reviewed:  Lab Results (last 24 hours)     " Procedure Component Value Units Date/Time    aPTT [[id number]]  (Normal) Collected:  09/03/19 2214    Specimen:  Blood Updated:  09/03/19 2308     PTT 31.5 seconds     Protime-INR [[id number]]  (Normal) Collected:  09/03/19 2214    Specimen:  Blood Updated:  09/03/19 2308     Protime 13.0 Seconds      INR 0.95    TSH [[id number]]  (Normal) Collected:  09/03/19 2214    Specimen:  Blood Updated:  09/03/19 2306     TSH 1.410 uIU/mL     Troponin [[id number]]  (Normal) Collected:  09/03/19 2214    Specimen:  Blood Updated:  09/03/19 2249     Troponin I 0.033 ng/mL     BNP [[id number]]  (Abnormal) Collected:  09/03/19 2214    Specimen:  Blood Updated:  09/03/19 2249     proBNP 3,360.0 pg/mL     Comprehensive Metabolic Panel [[id number]]  (Abnormal) Collected:  09/03/19 2214    Specimen:  Blood Updated:  09/03/19 2237     Glucose 109 mg/dL      BUN 45 mg/dL      Creatinine 7.55 mg/dL      Sodium 143 mmol/L      Potassium 3.9 mmol/L      Chloride 98 mmol/L      CO2 25.0 mmol/L      Calcium 9.3 mg/dL      Total Protein 7.8 g/dL      Albumin 4.60 g/dL      ALT (SGPT) 20 U/L      AST (SGOT) 32 U/L      Alkaline Phosphatase 74 U/L      Total Bilirubin 1.0 mg/dL      eGFR Non African Amer 5 mL/min/1.73      Comment: <15 Indicative of kidney failure.        eGFR   Amer --     Comment: <15 Indicative of kidney failure.        Globulin 3.2 gm/dL      A/G Ratio 1.4 g/dL      BUN/Creatinine Ratio 6.0     Anion Gap 20.0 mmol/L     Narrative:       GFR Normal >60  Chronic Kidney Disease <60  Kidney Failure <15    Lipase [[id number]]  (Normal) Collected:  09/03/19 2214    Specimen:  Blood Updated:  09/03/19 2237     Lipase 194 U/L     Magnesium [[id number]]  (Abnormal) Collected:  09/03/19 2214    Specimen:  Blood Updated:  09/03/19 2237     Magnesium 2.3 mg/dL     Lactic Acid, Plasma [[id number]]  (Normal) Collected:  09/03/19 2214    Specimen:  Blood Updated:  09/03/19 2235     Lactate 1.3 mmol/L     Blood Culture - Blood, Arm, Right  [[id number]] Collected:  09/03/19 2215    Specimen:  Blood from Arm, Right Updated:  09/03/19 2233    Blood Culture - Blood, Arm, Right [[id number]] Collected:  09/03/19 2113    Specimen:  Blood from Arm, Right Updated:  09/03/19 2232    CBC & Differential [[id number]] Collected:  09/03/19 2214    Specimen:  Blood Updated:  09/03/19 2225    Narrative:       The following orders were created for panel order CBC & Differential.  Procedure                               Abnormality         Status                     ---------                               -----------         ------                     CBC Auto Differential[[id number]]        Abnormal            Final result                 Please view results for these tests on the individual orders.    CBC Auto Differential [[id number]]  (Abnormal) Collected:  09/03/19 2214    Specimen:  Blood Updated:  09/03/19 2225     WBC 6.37 10*3/mm3      RBC 4.20 10*6/mm3      Hemoglobin 13.3 g/dL      Hematocrit 39.6 %      MCV 94.3 fL      MCH 31.7 pg      MCHC 33.6 g/dL      RDW 14.6 %      RDW-SD 50.7 fl      MPV 8.8 fL      Platelets 187 10*3/mm3      Neutrophil % 74.4 %      Lymphocyte % 20.1 %      Monocyte % 4.7 %      Eosinophil % 0.2 %      Basophil % 0.3 %      Immature Grans % 0.3 %      Neutrophils, Absolute 4.74 10*3/mm3      Lymphocytes, Absolute 1.28 10*3/mm3      Monocytes, Absolute 0.30 10*3/mm3      Eosinophils, Absolute 0.01 10*3/mm3      Basophils, Absolute 0.02 10*3/mm3      Immature Grans, Absolute 0.02 10*3/mm3      nRBC 0.0 /100 WBC         Imaging Results (last 24 hours)     Procedure Component Value Units Date/Time    CT Abdomen Pelvis Without Contrast [[id number]] Collected:  09/03/19 2158     Updated:  09/03/19 2204    Narrative:       EXAMINATION: CT ABDOMEN PELVIS WO CONTRAST-      9/3/2019 9:51 PM CDT     HISTORY: Abdominal pain with nausea and vomiting.     In order to have a CT radiation dose as low as reasonably achievable  Automated Exposure Control  was utilized for adjustment of the mA and/or  KV according to patient size.     DLP in mGycm= 255.     Noncontrast abdomen/pelvis CT.     No renal or ureteral stone.  No hydronephrosis.     Linear band of infiltrate along left heart border is incompletely  visualized on this abdomen/pelvis CT the head the appearance of  pneumonia on chest x-ray.  Normal heart size.  The right lower lobe is clear.     Cholecystectomy clips are present.  Normal noncontrast appearance of the liver, pancreas, and spleen.  Normal and symmetric adrenal glands.     Aortic calcification with no aneurysm.     No bowel obstruction.  No appendicitis, diverticulitis, or colitis.     No pelvic mass or fluid.     Summary:  1. Findings suspicious for lingular infiltrate along the left heart  border. This was better visualized on the recent chest x-ray.  2. No mass, fluid collection, or inflammatory process is seen within the  abdomen pelvis.                                   This report was finalized on 09/03/2019 22:01 by Dr. Dharmesh Zuleta MD.    XR Chest 1 View [[id number]] Collected:  09/03/19 2144     Updated:  09/03/19 2150    Narrative:       EXAMINATION: XR CHEST 1 VW-     9/3/2019 9:37 PM CDT     HISTORY: Short of breath.     One view chest x-ray compared with 06/08/2006.     Patchy infiltrate along the left heart border probably involves the  lingula.     The upper lobes are clear.     There is no pneumothorax or heart failure.     Summary:  1. Left-sided pneumonia along the left heart border probably within a  lingular segment of the left upper lobe.        This report was finalized on 09/03/2019 21:47 by Dr. Dharmesh Zuleta MD.        I have personally reviewed and interpreted the radiology studies and ECG obtained at time of admission.     Assessment / Plan     Assessment:   Active Hospital Problems    Diagnosis   • Pneumonia due to infectious organism   • End stage renal failure on dialysis (CMS/HCC)   • Diabetic nephropathy (CMS/[facility])    • Glaucoma   • HTN (hypertension)   1.  Pneumonia is to admit patient culture blood and sputum.  We will try to check urine for Legionella and strep pneumo.  Initiate antibiotics with Rocephin and doxycycline.  Aggressive pulmonary toilet with Mucinex DuoNeb's.  She is also wheezing I suspect she has a component of COPD with this since it is in both lung fields.  We will add Solu-Medrol as well.  2.  Suspected COPD with exacerbation plans as per above Solu-Medrol DuoNeb's Mucinex and antibiotics.  3.  End-stage renal disease on hemodialysis consult nephrology to continue dialysis while she is here.  4.  Type 2 diabetes Accu-Chek sliding scale insulin check A1c.  We will also continue her home long-acting insulin.  5.  Glaucoma continue multiple eyedrops that she is on from home meds.  6.  Tobacco habituation encouraged smoking cessation nicotine patch is ordered.  7.  Constipation will order patient Alfredo enema.      Patient seen prior to midnight         Code Status: Full     I discussed the patient's findings and my recommendations with the patient and Sundeep who is her significant other and also her surrogate decision maker should she not be able to speak for herself.    Estimated length of stay 2 days    Tejinder Kent MD   09/04/19   12:14 AM              Electronically signed by Tejinder Kent MD at 9/4/2019 12:32 AM

## 2019-09-04 NOTE — CONSULTS
Nephrology ([facility]) Consult Note      Patient:  Mini Gentile  YOB: 1960  Date of Service: 9/4/2019  MRN: [id number]   Acct: [id number]   Primary Care Physician: Ivan Kelly MD  Advance Directive:   Code Status and Medical Interventions:   Ordered at: 09/04/19 0013     Level Of Support Discussed With:    Patient     Code Status:    CPR     Medical Interventions (Level of Support Prior to Arrest):    Full     Admit Date: 9/3/2019       Hospital Day: 1  Referring Provider: Tejinder Kent MD      Patient personally seen and examined.  Complete chart including Consults, Notes, Operative Reports, Labs, Cardiology, and Radiology studies reviewed as able.        Subjective:  Mini Gentile is a 59 y.o. female  whom we were consulted for end stage renal disease on maintenance hemodialysis.  Receives her HD at [facility] on Monday Wednesday Friday.  Missed Monday's treatment due to illness.  No other recent issues with dialysis.  History of type 2 diabetes with nephropathy, hypertension, tobacco abuse.  Presented to ER with one week history of dyspnea, productive cough, nausea.  Admitted for pneumonia.  Currently is seen during dialysis. Denies dyspnea at time of exam.  Dialysis   Pt was seen on RRT; tolerating well  Modality: Hemodialysis  Access: Arterial Venous Fistula  Location: left upper  QB: 450  QD: 500  UF: 1000     Allergies:  Bupropion; Chantix [varenicline]; Sulfa antibiotics; Azithromycin; Levofloxacin; and Penicillins    Home Meds:  Medications Prior to Admission   Medication Sig Dispense Refill Last Dose   • aspirin 81 MG EC tablet Take 81 mg by mouth Daily.   Taking   • benzonatate (TESSALON) 100 MG capsule Take 1 capsule by mouth 3 (Three) Times a Day As Needed for Cough. 30 capsule 0 Taking   • bimatoprost (LUMIGAN) 0.01 % ophthalmic drops Administer 1 drop into the left eye Every Night.   Taking   • brimonidine (ALPHAGAN)  0.2 % ophthalmic solution Administer 1 drop into the left eye Daily.   Taking   • calcitriol (ROCALTROL) 0.25 MCG capsule Take 0.25 mcg by mouth Daily.   Taking   • calcium carbonate (TUMS) 500 MG chewable tablet Chew 1 tablet 3 (Three) Times a Day.   Taking   • Cetirizine HCl 10 MG capsule Take 10 mg by mouth.   Taking   • dorzolamide (TRUSOPT) 2 % ophthalmic solution Administer 1 drop into the left eye 2 (Two) Times a Day.   Taking   • fluticasone (FLONASE) 50 MCG/ACT nasal spray SHAKE LQ AND U 2 SPRAYS IEN D  3 Taking   • homatropine 5 % ophthalmic solution Administer 1 drop into the left eye Daily.   Taking   • insulin glargine (LANTUS) 100 UNIT/ML injection Inject 10 Units under the skin Every Night.   Taking   • lidocaine-prilocaine (EMLA) 2.5-2.5 % cream APPLY TO ACCESS AREA 30 MINUTES PRIOR TO HEMODIALYSIS THREE TIMES WEEKLY  0 Taking   • losartan (COZAAR) 100 MG tablet Take 100 mg by mouth Daily.   Taking   • midodrine (PROAMATINE) 5 MG tablet TK 1 T PO HALF WAY THROUGH TREATMENT AS DIRECTED  3 Taking   • pravastatin (PRAVACHOL) 10 MG tablet Take 10 mg by mouth Every Night.   Taking   • raNITIdine (ZANTAC) 150 MG tablet Take 150 mg by mouth 2 (Two) Times a Day.   Taking   • timolol (TIMOPTIC-XR) 0.5 % ophthalmic gel-forming Administer 1 drop into the left eye 3 (Three) Times a Day.   Taking       Medicines:  Current Facility-Administered Medications   Medication Dose Route Frequency Provider Last Rate Last Dose   • acetaminophen (TYLENOL) tablet 650 mg  650 mg Oral Q4H PRN Teijnder Kent MD   650 mg at 09/04/19 0339    Or   • acetaminophen (TYLENOL) 160 MG/5ML solution 650 mg  650 mg Oral Q4H PRN Tejinder Kent MD        Or   • acetaminophen (TYLENOL) suppository 650 mg  650 mg Rectal Q4H PRN Tejinder Kent MD       • aspirin EC tablet 81 mg  81 mg Oral Daily Tejinder Kent MD       • atropine 1 % ophthalmic solution 1 drop  1 drop Left Eye BID Tejinder Kent MD       • benzonatate  (TESSALON) capsule 100 mg  100 mg Oral TID PRN Tejinder Kent MD       • brimonidine (ALPHAGAN) 0.2 % ophthalmic solution 1 drop  1 drop Left Eye Daily Tejinder Kent MD       • calcitriol (ROCALTROL) capsule 0.25 mcg  0.25 mcg Oral Daily Tejinder Kent MD       • calcium carbonate (oyster shell) tablet 500 mg  500 mg Oral TID Tejinder Kent MD       • cefTRIAXone (ROCEPHIN) 1 g/100 mL 0.9% NS (MBP)  1 g Intravenous Q24H Tejinder Kent MD        And   • [START ON 9/5/2019] doxycycline (VIBRAMYCIN) 100 mg/100 mL 0.9% NS MBP  100 mg Intravenous Q12H Tejinder Kent MD       • cetirizine (zyrTEC) tablet 10 mg  10 mg Oral Daily Tejinder Kent MD       • dextrose (D50W) 25 g/ 50mL Intravenous Solution 25 g  25 g Intravenous Q15 Min PRN Tejinder Kent MD       • dextrose (GLUTOSE) oral gel 15 g  15 g Oral Q15 Min PRN Tejinder Kent MD       • dorzolamide (TRUSOPT) 2 % ophthalmic solution 1 drop  1 drop Left Eye TID Tejinder Kent MD       • enoxaparin (LOVENOX) syringe 30 mg  30 mg Subcutaneous Q24H Tejinder Kent MD       • famotidine (PEPCID) tablet 20 mg  20 mg Oral Daily Tejinder Kent MD       • fluticasone (FLONASE) 50 MCG/ACT nasal spray 2 spray  2 spray Each Nare Daily Tejinder Kent MD       • glucagon (human recombinant) (GLUCAGEN DIAGNOSTIC) injection 1 mg  1 mg Subcutaneous Q15 Min PRN Tejinder Kent MD       • guaiFENesin (MUCINEX) 12 hr tablet 1,200 mg  1,200 mg Oral Q12H Tejinder Kent MD   1,200 mg at 09/04/19 0253   • HOG enema 360 mL  360 mL Rectal Once Tejinder Kent MD   Stopped at 09/04/19 0907   • insulin detemir (LEVEMIR) injection 10 Units  10 Units Subcutaneous Nightly Tejinder Kent MD   10 Units at 09/04/19 0253   • insulin lispro (humaLOG) injection 2-7 Units  2-7 Units Subcutaneous 4x Daily With Meals & Nightly Tejinder Kent MD   Stopped at 09/04/19 0908   • ipratropium-albuterol (DUO-NEB) nebulizer solution 3 mL  3 mL  Nebulization Q4H - RT Tejinder Kent MD   3 mL at 09/04/19 0727   • ipratropium-albuterol (DUO-NEB) nebulizer solution 3 mL  3 mL Nebulization Q4H PRN Tejinder Kent MD       • latanoprost (XALATAN) 0.005 % ophthalmic solution 1 drop  1 drop Left Eye Nightly Tejinder Kent MD   1 drop at 09/04/19 0255   • lidocaine-prilocaine (EMLA) 2.5-2.5 % cream   Topical PRN Tejinder Kent MD       • losartan (COZAAR) tablet 100 mg  100 mg Oral Daily Tejinder Kent MD       • methylPREDNISolone sodium succinate (SOLU-Medrol) injection 40 mg  40 mg Intravenous Q6H Tejinder Kent MD   40 mg at 09/04/19 0254   • midodrine (PROAMATINE) tablet 5 mg  5 mg Oral Daily PRN Tejinder Kent MD       • nicotine (NICODERM CQ) 21 MG/24HR patch 1 patch  1 patch Transdermal Q24H Tejinder Kent MD   1 patch at 09/04/19 0253   • ondansetron (ZOFRAN) tablet 4 mg  4 mg Oral Q6H PRN Tejinder Kent MD        Or   • ondansetron (ZOFRAN) injection 4 mg  4 mg Intravenous Q6H PRN Tejinder Kent MD       • pravastatin (PRAVACHOL) tablet 10 mg  10 mg Oral Nightly Tejinder Kent MD   10 mg at 09/04/19 0253   • sodium chloride 0.9 % flush 10 mL  10 mL Intravenous Q12H Tejinder Kent MD       • sodium chloride 0.9 % flush 10 mL  10 mL Intravenous PRN Tejinder Kent MD       • sodium chloride 0.9 % infusion  50 mL/hr Intravenous Continuous Tejinder Kent MD 50 mL/hr at 09/04/19 0254 50 mL/hr at 09/04/19 0254   • timolol (TIMOPTIC) 0.5 % ophthalmic solution 1 drop  1 drop Left Eye TID Tejinder Kent MD           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    • Chronic otitis media    • Diabetes (CMS/HCC)    • Eustachian tube dysfunction    • Glaucoma    • HTN (hypertension)    • Kidney failure     on dialysis   • Liver disorder    • Mucoid otitis media        Past Surgical History:  Past Surgical History:   Procedure Laterality Date  "  • ARTERIOVENOUS FISTULA     • CATARACT EXTRACTION WITH INTRAOCULAR LENS IMPLANT     •  SECTION     • CHOLECYSTECTOMY     • MYRINGOTOMY W/ TUBES Bilateral    • MYRINGOTOMY W/ TUBES Right    • TUBAL ABDOMINAL LIGATION         Family History  Family History   Problem Relation Age of Onset   • Heart disease Mother        Social History  Social History     Socioeconomic History   • Marital status:      Spouse name: Not on file   • Number of children: Not on file   • Years of education: Not on file   • Highest education level: Not on file   Tobacco Use   • Smoking status: Former Smoker     Packs/day: 2.00     Types: Cigarettes     Last attempt to quit: 10/27/2017     Years since quittin.8   • Smokeless tobacco: Never Used   Substance and Sexual Activity   • Alcohol use: No   • Drug use: Yes     Frequency: 1.0 times per week     Types: Marijuana     Comment: pt says she smokes marijuana once per week   • Sexual activity: Defer         Review of Systems:  History obtained from chart review and the patient  General ROS: No fever or chills  Respiratory ROS: positive for - cough  Cardiovascular ROS: No chest pain or palpitations  Gastrointestinal ROS: No abdominal pain or melena  Genito-Urinary ROS: No dysuria or hematuria  Neurological ROS: no headache or dizziness  14 point ROS reviewed with the patient and negative except as noted above and in the HPI unless unable to obtain.    Objective:  Patient Vitals for the past 24 hrs:   BP Temp Temp src Pulse Resp SpO2 Height Weight   19 0746 146/55 98.8 °F (37.1 °C) Oral 99 16 92 % -- --   19 0733 -- -- -- 106 16 95 % -- --   19 0727 -- -- -- 102 16 96 % -- --   19 0501 -- -- -- -- -- 93 % -- --   19 0500 148/58 98.7 °F (37.1 °C) Oral 102 18 (!) 88 % -- --   19 0336 -- -- -- 95 16 95 % -- --   19 0329 -- -- -- 97 16 91 % -- --   19 0100 177/81 99.7 °F (37.6 °C) Oral 96 18 92 % 149.9 cm (59\") 59.6 kg (131 lb 6.4 " "oz)   09/04/19 0045 157/73 98 °F (36.7 °C) -- 88 16 95 % -- --   09/03/19 2331 162/70 -- -- -- -- -- -- --   09/03/19 2329 -- -- -- 91 -- 94 % -- --   09/03/19 2240 (!) 187/90 -- -- -- 16 94 % -- --   09/03/19 2234 -- -- -- 91 16 95 % -- --   09/03/19 2228 -- -- -- 86 16 94 % -- --   09/03/19 2057 179/88 97.8 °F (36.6 °C) -- 88 16 95 % 149.9 cm (59\") 60.8 kg (134 lb)       Intake/Output Summary (Last 24 hours) at 9/4/2019 0920  Last data filed at 9/4/2019 0054  Gross per 24 hour   Intake 300 ml   Output --   Net 300 ml     General: awake/alert    Neck: supple, no JVD  Chest:  bilateral wheezes  CVS: regular rate and rhythm  Abdominal: soft, nontender, positive bowel sounds  Extremities: no cyanosis or edema  Skin: warm and dry without rash   Neuro: no focal motor deficits      Labs:  Results from last 7 days   Lab Units 09/04/19  0532 09/03/19  2214   WBC 10*3/mm3 7.18 6.37   HEMOGLOBIN g/dL 12.5 13.3   HEMATOCRIT % 37.8 39.6   PLATELETS 10*3/mm3 197 187         Results from last 7 days   Lab Units 09/04/19  0532 09/03/19  2214   SODIUM mmol/L 140 143   POTASSIUM mmol/L 3.9 3.9   CHLORIDE mmol/L 100 98   CO2 mmol/L 21.0* 25.0   BUN mg/dL 49* 45*   CREATININE mg/dL 7.54* 7.55*   CALCIUM mg/dL 8.9 9.3   BILIRUBIN mg/dL 0.9 1.0   ALK PHOS U/L 70 74   ALT (SGPT) U/L 18 20   AST (SGOT) U/L 36 32   GLUCOSE mg/dL 226* 109*       Radiology:   Imaging Results (last 72 hours)     Procedure Component Value Units Date/Time    CT Abdomen Pelvis Without Contrast [[id number]] Collected:  09/03/19 2158     Updated:  09/03/19 2204    Narrative:       EXAMINATION: CT ABDOMEN PELVIS WO CONTRAST-      9/3/2019 9:51 PM CDT     HISTORY: Abdominal pain with nausea and vomiting.     In order to have a CT radiation dose as low as reasonably achievable  Automated Exposure Control was utilized for adjustment of the mA and/or  KV according to patient size.     DLP in mGycm= 255.     Noncontrast abdomen/pelvis CT.     No renal or ureteral " stone.  No hydronephrosis.     Linear band of infiltrate along left heart border is incompletely  visualized on this abdomen/pelvis CT the head the appearance of  pneumonia on chest x-ray.  Normal heart size.  The right lower lobe is clear.     Cholecystectomy clips are present.  Normal noncontrast appearance of the liver, pancreas, and spleen.  Normal and symmetric adrenal glands.     Aortic calcification with no aneurysm.     No bowel obstruction.  No appendicitis, diverticulitis, or colitis.     No pelvic mass or fluid.     Summary:  1. Findings suspicious for lingular infiltrate along the left heart  border. This was better visualized on the recent chest x-ray.  2. No mass, fluid collection, or inflammatory process is seen within the  abdomen pelvis.                                   This report was finalized on 09/03/2019 22:01 by Dr. Dharmesh Zuleta MD.    XR Chest 1 View [[id number]] Collected:  09/03/19 2144     Updated:  09/03/19 2150    Narrative:       EXAMINATION: XR CHEST 1 VW-     9/3/2019 9:37 PM CDT     HISTORY: Short of breath.     One view chest x-ray compared with 06/08/2006.     Patchy infiltrate along the left heart border probably involves the  lingula.     The upper lobes are clear.     There is no pneumothorax or heart failure.     Summary:  1. Left-sided pneumonia along the left heart border probably within a  lingular segment of the left upper lobe.        This report was finalized on 09/03/2019 21:47 by Dr. Dharmesh Zuleta MD.          Culture:         Assessment   1.  End stage renal disease on HD MWF  2.  Type 2 diabetes with nephropathy  3.  Benign hypertension  4.  Pneumonia     Plan:  1.  Dialysis today  2.  Continue IV antibiotics  3.  Further assessment and plan pending Dr Boyd's evaluation of patient      Thank you for the consult, we appreciate the opportunity to provide care to your patients.  Feel free to contact me if I can be of any further assistance.      Gomez Lemos,  APRN  9/4/2019  9:20 AM

## 2019-09-04 NOTE — H&P
[facility] Medicine Services  HISTORY AND PHYSICAL    Date of Admission: 9/3/2019  Primary Care Physician: Ivan Kelly MD    Subjective     Chief Complaint: Cough nausea vomiting    History of Present Illness  Patient is a 59-year-old who presents to the emergency room with a one-week history of cough productive of green sputum nausea shortness of breath.  She also complains that she has had a bowel movement in a week.  She was evaluated in the ER found to have on chest x-ray a linear infiltrate probably involving the lingula left lung.  Her problems are further complicated by the fact that she has end-stage renal disease on hemodialysis as well as type 2 diabetes.  She also still smokes 2 packs a day.  CT scan of her abdomen is unremarkable for acute problems except for the infiltrate seen consistent with the pneumonia on chest x-ray.  Her other problems include glaucoma she is blind in the right eye.  We have been asked to admit her for further evaluation and management of pneumonia.        Review of Systems   14 point review of systems negative except for as per HPI    Otherwise complete ROS reviewed and negative except as mentioned in the HPI.    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 Her smoking use included  cigarettes. She smoked 2.00 packs per day. She has never used smokeless tobacco. She reports that she does not drink alcohol or use drugs.    Family History: family history includes Heart disease in her mother.       Allergies:  Allergies   Allergen Reactions   • Bupropion Nausea Only   • Chantix [Varenicline]    • Sulfa Antibiotics    • Azithromycin Rash   • Levofloxacin Rash   • Penicillins Rash     Medications:  Prior to Admission medications    Medication Sig Start Date End Date Taking? Authorizing Provider   aspirin 81 MG EC tablet Take 81 mg by mouth Daily.    Steve Warren MD   benzonatate (TESSALON) 100 MG capsule Take 1 capsule by mouth 3 (Three) Times a Day As Needed for Cough. 4/1/17   Ivy Jernigan APRN   bimatoprost (LUMIGAN) 0.01 % ophthalmic drops Administer 1 drop into the left eye Every Night.    Steve Warren MD   brimonidine (ALPHAGAN) 0.2 % ophthalmic solution Administer 1 drop into the left eye Daily.    Steve Warren MD   calcitriol (ROCALTROL) 0.25 MCG capsule Take 0.25 mcg by mouth Daily.    Steve Warren MD   calcium carbonate (TUMS) 500 MG chewable tablet Chew 1 tablet 3 (Three) Times a Day.    Emergency, Nurse Epic, RN   Cetirizine HCl 10 MG capsule Take 10 mg by mouth.    Steve Warren MD   dorzolamide (TRUSOPT) 2 % ophthalmic solution Administer 1 drop into the left eye 2 (Two) Times a Day.    Steve Warren MD   fluticasone (FLONASE) 50 MCG/ACT nasal spray SHAKE LQ AND U 2 SPRAYS IEN D 4/28/17   Steve Warren MD   homatropine 5 % ophthalmic solution Administer 1 drop into the left eye Daily.    Steve Warren MD   insulin glargine (LANTUS) 100 UNIT/ML injection Inject 10 Units under the skin Every Night.    Steve Warren MD   lidocaine-prilocaine (EMLA) 2.5-2.5 % cream APPLY TO ACCESS AREA 30 MINUTES PRIOR TO HEMODIALYSIS THREE TIMES WEEKLY 4/21/17   Steve Warren MD   losartan (COZAAR) 100 MG tablet Take 100  "mg by mouth Daily.    Steve Warren MD   midodrine (PROAMATINE) 5 MG tablet TK 1 T PO HALF WAY THROUGH TREATMENT AS DIRECTED 10/17/17   Steve Warren MD   pravastatin (PRAVACHOL) 10 MG tablet Take 10 mg by mouth Every Night.    Steve Warren MD   raNITIdine (ZANTAC) 150 MG tablet Take 150 mg by mouth 2 (Two) Times a Day.    Steve Warren MD   timolol (TIMOPTIC-XR) 0.5 % ophthalmic gel-forming Administer 1 drop into the left eye 3 (Three) Times a Day.    Steve Warren MD     Objective     Vital Signs: BP (!) 187/90   Pulse 91   Temp 97.8 °F (36.6 °C)   Resp 16   Ht 149.9 cm (59\")   Wt 60.8 kg (134 lb)   SpO2 94%   BMI 27.06 kg/m²   Physical Exam  Gen.:  Well-nourished well-developed white female in no acute distress  HEENT: Atraumatic, normocephalic.  Pupils equal, round, and reactive to light. Extraocular movements intact.  Sclerae anicteric.  TMs negative.  Mucous membranes moist.  Neck is supple without lymphadenopathy.  No JVD is noted.  No carotid bruits are auscultated.  Oropharynx is without erythema or exudate.   Chest: Rhonchi diffusely both lungs mild wheezes.  CV: Regular rate and rhythm.  Normal S1-S2.  No gallops, murmurs. or rubs.  Abdomen: Soft, nontender, nondistended.  Active bowel sounds,  No hepatosplenomegaly.  No masses.  No hernias.  Extremities: No clubbing, edema, or cyanosis.  Capillary refill is normal.  Pulses are 2+ and symmetric at radial and dorsalis pedis.  Posterior tibial pulses are intact and 2+ palpable.  Neuro: Patient is awake, alert, and oriented ×3.  Cranial nerves II through XII are grossly intact.  Motor is 5 out of 5 bilaterally.  DTRs are 2+ and symmetric bilaterally. Sensory exam is nonfocal  Skin: Warm, dry, and intact.  No evidence of breakdown.  Sensorium: Normal thought and affect    Nursing notes and vital signs reviewed        Results Reviewed:  Lab Results (last 24 hours)     Procedure Component Value Units Date/Time    " aPTT [[id number]]  (Normal) Collected:  09/03/19 2214    Specimen:  Blood Updated:  09/03/19 2308     PTT 31.5 seconds     Protime-INR [[id number]]  (Normal) Collected:  09/03/19 2214    Specimen:  Blood Updated:  09/03/19 2308     Protime 13.0 Seconds      INR 0.95    TSH [[id number]]  (Normal) Collected:  09/03/19 2214    Specimen:  Blood Updated:  09/03/19 2306     TSH 1.410 uIU/mL     Troponin [[id number]]  (Normal) Collected:  09/03/19 2214    Specimen:  Blood Updated:  09/03/19 2249     Troponin I 0.033 ng/mL     BNP [[id number]]  (Abnormal) Collected:  09/03/19 2214    Specimen:  Blood Updated:  09/03/19 2249     proBNP 3,360.0 pg/mL     Comprehensive Metabolic Panel [[id number]]  (Abnormal) Collected:  09/03/19 2214    Specimen:  Blood Updated:  09/03/19 2237     Glucose 109 mg/dL      BUN 45 mg/dL      Creatinine 7.55 mg/dL      Sodium 143 mmol/L      Potassium 3.9 mmol/L      Chloride 98 mmol/L      CO2 25.0 mmol/L      Calcium 9.3 mg/dL      Total Protein 7.8 g/dL      Albumin 4.60 g/dL      ALT (SGPT) 20 U/L      AST (SGOT) 32 U/L      Alkaline Phosphatase 74 U/L      Total Bilirubin 1.0 mg/dL      eGFR Non African Amer 5 mL/min/1.73      Comment: <15 Indicative of kidney failure.        eGFR   Amer --     Comment: <15 Indicative of kidney failure.        Globulin 3.2 gm/dL      A/G Ratio 1.4 g/dL      BUN/Creatinine Ratio 6.0     Anion Gap 20.0 mmol/L     Narrative:       GFR Normal >60  Chronic Kidney Disease <60  Kidney Failure <15    Lipase [[id number]]  (Normal) Collected:  09/03/19 2214    Specimen:  Blood Updated:  09/03/19 2237     Lipase 194 U/L     Magnesium [[id number]]  (Abnormal) Collected:  09/03/19 2214    Specimen:  Blood Updated:  09/03/19 2237     Magnesium 2.3 mg/dL     Lactic Acid, Plasma [[id number]]  (Normal) Collected:  09/03/19 2214    Specimen:  Blood Updated:  09/03/19 2235     Lactate 1.3 mmol/L     Blood Culture - Blood, Arm, Right [[id number]] Collected:  09/03/19 2215     Specimen:  Blood from Arm, Right Updated:  09/03/19 2233    Blood Culture - Blood, Arm, Right [[id number]] Collected:  09/03/19 2113    Specimen:  Blood from Arm, Right Updated:  09/03/19 2232    CBC & Differential [[id number]] Collected:  09/03/19 2214    Specimen:  Blood Updated:  09/03/19 2225    Narrative:       The following orders were created for panel order CBC & Differential.  Procedure                               Abnormality         Status                     ---------                               -----------         ------                     CBC Auto Differential[[id number]]        Abnormal            Final result                 Please view results for these tests on the individual orders.    CBC Auto Differential [[id number]]  (Abnormal) Collected:  09/03/19 2214    Specimen:  Blood Updated:  09/03/19 2225     WBC 6.37 10*3/mm3      RBC 4.20 10*6/mm3      Hemoglobin 13.3 g/dL      Hematocrit 39.6 %      MCV 94.3 fL      MCH 31.7 pg      MCHC 33.6 g/dL      RDW 14.6 %      RDW-SD 50.7 fl      MPV 8.8 fL      Platelets 187 10*3/mm3      Neutrophil % 74.4 %      Lymphocyte % 20.1 %      Monocyte % 4.7 %      Eosinophil % 0.2 %      Basophil % 0.3 %      Immature Grans % 0.3 %      Neutrophils, Absolute 4.74 10*3/mm3      Lymphocytes, Absolute 1.28 10*3/mm3      Monocytes, Absolute 0.30 10*3/mm3      Eosinophils, Absolute 0.01 10*3/mm3      Basophils, Absolute 0.02 10*3/mm3      Immature Grans, Absolute 0.02 10*3/mm3      nRBC 0.0 /100 WBC         Imaging Results (last 24 hours)     Procedure Component Value Units Date/Time    CT Abdomen Pelvis Without Contrast [[id number]] Collected:  09/03/19 2158     Updated:  09/03/19 2204    Narrative:       EXAMINATION: CT ABDOMEN PELVIS WO CONTRAST-      9/3/2019 9:51 PM CDT     HISTORY: Abdominal pain with nausea and vomiting.     In order to have a CT radiation dose as low as reasonably achievable  Automated Exposure Control was utilized for adjustment of the mA  and/or  KV according to patient size.     DLP in mGycm= 255.     Noncontrast abdomen/pelvis CT.     No renal or ureteral stone.  No hydronephrosis.     Linear band of infiltrate along left heart border is incompletely  visualized on this abdomen/pelvis CT the head the appearance of  pneumonia on chest x-ray.  Normal heart size.  The right lower lobe is clear.     Cholecystectomy clips are present.  Normal noncontrast appearance of the liver, pancreas, and spleen.  Normal and symmetric adrenal glands.     Aortic calcification with no aneurysm.     No bowel obstruction.  No appendicitis, diverticulitis, or colitis.     No pelvic mass or fluid.     Summary:  1. Findings suspicious for lingular infiltrate along the left heart  border. This was better visualized on the recent chest x-ray.  2. No mass, fluid collection, or inflammatory process is seen within the  abdomen pelvis.                                   This report was finalized on 09/03/2019 22:01 by Dr. Dharmesh Zuleta MD.    XR Chest 1 View [[id number]] Collected:  09/03/19 2144     Updated:  09/03/19 2150    Narrative:       EXAMINATION: XR CHEST 1 VW-     9/3/2019 9:37 PM CDT     HISTORY: Short of breath.     One view chest x-ray compared with 06/08/2006.     Patchy infiltrate along the left heart border probably involves the  lingula.     The upper lobes are clear.     There is no pneumothorax or heart failure.     Summary:  1. Left-sided pneumonia along the left heart border probably within a  lingular segment of the left upper lobe.        This report was finalized on 09/03/2019 21:47 by Dr. Dharmesh Zuleta MD.        I have personally reviewed and interpreted the radiology studies and ECG obtained at time of admission.     Assessment / Plan     Assessment:   Active Hospital Problems    Diagnosis   • Pneumonia due to infectious organism   • End stage renal failure on dialysis (CMS/HCC)   • Diabetic nephropathy (CMS/HCC)   • Glaucoma   • HTN (hypertension)   1.   Pneumonia is to admit patient culture blood and sputum.  We will try to check urine for Legionella and strep pneumo.  Initiate antibiotics with Rocephin and doxycycline.  Aggressive pulmonary toilet with Mucinex DuoNeb's.  She is also wheezing I suspect she has a component of COPD with this since it is in both lung fields.  We will add Solu-Medrol as well.  2.  Suspected COPD with exacerbation plans as per above Solu-Medrol DuoNeb's Mucinex and antibiotics.  3.  End-stage renal disease on hemodialysis consult nephrology to continue dialysis while she is here.  4.  Type 2 diabetes Accu-Chek sliding scale insulin check A1c.  We will also continue her home long-acting insulin.  5.  Glaucoma continue multiple eyedrops that she is on from home meds.  6.  Tobacco habituation encouraged smoking cessation nicotine patch is ordered.  7.  Constipation will order patient Alfredo enema.      Patient seen prior to midnight         Code Status: Full     I discussed the patient's findings and my recommendations with the patient and Sundeep who is her significant other and also her surrogate decision maker should she not be able to speak for herself.    Estimated length of stay 2 days    Tejinder Kent MD   09/04/19   12:14 AM

## 2019-09-05 VITALS
HEART RATE: 92 BPM | SYSTOLIC BLOOD PRESSURE: 143 MMHG | WEIGHT: 136.02 LBS | HEIGHT: 59 IN | RESPIRATION RATE: 16 BRPM | DIASTOLIC BLOOD PRESSURE: 71 MMHG | OXYGEN SATURATION: 94 % | TEMPERATURE: 98.5 F | BODY MASS INDEX: 27.42 KG/M2

## 2019-09-05 LAB — GLUCOSE BLDC GLUCOMTR-MCNC: 134 MG/DL (ref 70–130)

## 2019-09-05 PROCEDURE — 97165 OT EVAL LOW COMPLEX 30 MIN: CPT | Performed by: OCCUPATIONAL THERAPIST

## 2019-09-05 PROCEDURE — 94760 N-INVAS EAR/PLS OXIMETRY 1: CPT

## 2019-09-05 PROCEDURE — 25010000002 METHYLPREDNISOLONE PER 40 MG: Performed by: INTERNAL MEDICINE

## 2019-09-05 PROCEDURE — 97535 SELF CARE MNGMENT TRAINING: CPT | Performed by: OCCUPATIONAL THERAPIST

## 2019-09-05 PROCEDURE — 5A1D70Z PERFORMANCE OF URINARY FILTRATION, INTERMITTENT, LESS THAN 6 HOURS PER DAY: ICD-10-PCS | Performed by: NURSE PRACTITIONER

## 2019-09-05 PROCEDURE — 94799 UNLISTED PULMONARY SVC/PX: CPT

## 2019-09-05 PROCEDURE — 82962 GLUCOSE BLOOD TEST: CPT

## 2019-09-05 PROCEDURE — 97161 PT EVAL LOW COMPLEX 20 MIN: CPT | Performed by: PHYSICAL THERAPIST

## 2019-09-05 PROCEDURE — 25010000002 ENOXAPARIN PER 10 MG: Performed by: INTERNAL MEDICINE

## 2019-09-05 RX ORDER — SEVELAMER CARBONATE 800 MG/1
800 TABLET, FILM COATED ORAL
Status: DISCONTINUED | OUTPATIENT
Start: 2019-09-05 | End: 2019-09-05 | Stop reason: HOSPADM

## 2019-09-05 RX ORDER — PREDNISONE 20 MG/1
20 TABLET ORAL DAILY
Qty: 11 TABLET | Refills: 0 | Status: SHIPPED | OUTPATIENT
Start: 2019-09-05 | End: 2019-12-11 | Stop reason: HOSPADM

## 2019-09-05 RX ORDER — SERTRALINE HYDROCHLORIDE 25 MG/1
25 TABLET, FILM COATED ORAL NIGHTLY
Status: DISCONTINUED | OUTPATIENT
Start: 2019-09-05 | End: 2019-09-05 | Stop reason: HOSPADM

## 2019-09-05 RX ORDER — GUAIFENESIN 600 MG/1
1200 TABLET, EXTENDED RELEASE ORAL EVERY 12 HOURS SCHEDULED
Start: 2019-09-05 | End: 2019-12-11 | Stop reason: HOSPADM

## 2019-09-05 RX ORDER — BENZONATATE 100 MG/1
100 CAPSULE ORAL 3 TIMES DAILY PRN
Qty: 20 CAPSULE | Refills: 0 | Status: SHIPPED | OUTPATIENT
Start: 2019-09-05 | End: 2019-12-11 | Stop reason: HOSPADM

## 2019-09-05 RX ORDER — CEFDINIR 300 MG/1
300 CAPSULE ORAL 2 TIMES DAILY
Qty: 24 CAPSULE | Refills: 0 | Status: SHIPPED | OUTPATIENT
Start: 2019-09-05 | End: 2019-09-17

## 2019-09-05 RX ADMIN — IPRATROPIUM BROMIDE AND ALBUTEROL SULFATE 3 ML: 2.5; .5 SOLUTION RESPIRATORY (INHALATION) at 14:28

## 2019-09-05 RX ADMIN — DORZOLAMIDE HYDROCHLORIDE 1 DROP: 20 SOLUTION OPHTHALMIC at 11:32

## 2019-09-05 RX ADMIN — CETIRIZINE HYDROCHLORIDE 10 MG: 10 TABLET, FILM COATED ORAL at 11:36

## 2019-09-05 RX ADMIN — NICOTINE 1 PATCH: 21 PATCH, EXTENDED RELEASE TRANSDERMAL at 03:21

## 2019-09-05 RX ADMIN — ASPIRIN 81 MG: 81 TABLET ORAL at 11:35

## 2019-09-05 RX ADMIN — FLUTICASONE PROPIONATE 2 SPRAY: 50 SPRAY, METERED NASAL at 11:38

## 2019-09-05 RX ADMIN — LOSARTAN POTASSIUM 100 MG: 50 TABLET, FILM COATED ORAL at 11:43

## 2019-09-05 RX ADMIN — CALCITRIOL 0.25 MCG: 0.25 CAPSULE ORAL at 11:36

## 2019-09-05 RX ADMIN — IPRATROPIUM BROMIDE AND ALBUTEROL SULFATE 3 ML: 2.5; .5 SOLUTION RESPIRATORY (INHALATION) at 06:59

## 2019-09-05 RX ADMIN — SEVELAMER CARBONATE 800 MG: 800 TABLET, FILM COATED ORAL at 11:55

## 2019-09-05 RX ADMIN — ENOXAPARIN SODIUM 30 MG: 30 INJECTION SUBCUTANEOUS at 11:43

## 2019-09-05 RX ADMIN — METHYLPREDNISOLONE SODIUM SUCCINATE 40 MG: 40 INJECTION, POWDER, FOR SOLUTION INTRAMUSCULAR; INTRAVENOUS at 03:20

## 2019-09-05 RX ADMIN — TIMOLOL MALEATE 1 DROP: 5 SOLUTION/ DROPS OPHTHALMIC at 11:32

## 2019-09-05 RX ADMIN — BRIMONIDINE TARTRATE 1 DROP: 2 SOLUTION OPHTHALMIC at 11:33

## 2019-09-05 RX ADMIN — METHYLPREDNISOLONE SODIUM SUCCINATE 40 MG: 40 INJECTION, POWDER, FOR SOLUTION INTRAMUSCULAR; INTRAVENOUS at 11:38

## 2019-09-05 RX ADMIN — SODIUM CHLORIDE, PRESERVATIVE FREE 10 ML: 5 INJECTION INTRAVENOUS at 11:44

## 2019-09-05 RX ADMIN — GUAIFENESIN 1200 MG: 600 TABLET, EXTENDED RELEASE ORAL at 11:35

## 2019-09-05 RX ADMIN — FAMOTIDINE 20 MG: 20 TABLET, FILM COATED ORAL at 11:35

## 2019-09-05 RX ADMIN — ATROPINE SULFATE 1 DROP: 10 SOLUTION/ DROPS OPHTHALMIC at 11:33

## 2019-09-05 NOTE — THERAPY DISCHARGE NOTE
Acute Care - Occupational Therapy Initial Eval/Discharge  [facility]     Patient Name: Mini Gentile  : 1960  MRN: [id number]  Today's Date: 2019  Onset of Illness/Injury or Date of Surgery: 19  Date of Referral to OT: 19  Referring Physician: Dr. Dempsey      Admit Date: 9/3/2019       ICD-10-CM ICD-9-CM   1. Pneumonia due to infectious organism, unspecified laterality, unspecified part of lung J18.9 136.9     484.8   2. Chronic kidney disease, unspecified CKD stage N18.9 585.9   3. 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    • Chronic otitis media    • Diabetes (CMS/[facility])    • Eustachian tube dysfunction    • Glaucoma    • HTN (hypertension)    • Kidney failure     on dialysis   • Liver disorder    • Mucoid otitis media      Past Surgical History:   Procedure Laterality Date   • ARTERIOVENOUS FISTULA     • CATARACT EXTRACTION WITH INTRAOCULAR LENS IMPLANT     •  SECTION     • CHOLECYSTECTOMY     • MYRINGOTOMY W/ TUBES Bilateral    • MYRINGOTOMY W/ TUBES Right    • TUBAL ABDOMINAL LIGATION            OT ASSESSMENT FLOWSHEET (last 12 hours)      Occupational Therapy Evaluation     Row Name 19 1321                   OT Evaluation Time/Intention    Subjective Information  no complaints  -MM        Document Type  evaluation  -MM        Mode of Treatment  occupational therapy  -MM           General Information    Patient Profile Reviewed?  yes  -MM        Onset of  Illness/Injury or Date of Surgery  09/03/19  -MM        Referring Physician  Dr. Dempsey  -MM        Patient Observations  alert;cooperative;agree to therapy  -MM        Patient/Family Observations  no family present  -MM        General Observations of Patient  fowlers, no apparent distress, O2/NC sitting beside pt reports she was told she could take it off  -MM        Prior Level of Function  independent:;all household mobility;community mobility;transfer;bed mobility;ADL's;feeding;grooming;dressing;bathing;shopping does not drive, uses community transportation  -MM        Equipment Currently Used at Home  none  -MM        Pertinent History of Current Functional Problem  presents with cough, nausea and SOB; dx pneumonia of infectious organism  -MM        Existing Precautions/Restrictions  no known precautions/restrictions  -MM        Risks Reviewed  patient:;LOB;nausea/vomiting;dizziness;increased discomfort;change in vital signs;lines disloged  -MM        Benefits Reviewed  patient:;improve function;increase strength;increase independence;increase balance;decrease pain;decrease risk of DVT;improve skin integrity;increase knowledge  -MM        Barriers to Rehab  none identified  -MM           Relationship/Environment    Lives With  significant other  -MM           Resource/Environmental Concerns    Current Living Arrangements  home/apartment/condo  -MM           Home Main Entrance    Number of Stairs, Main Entrance  four  -MM        Stair Railings, Main Entrance  railings on both sides of stairs  -MM           Stairs Within Home, Primary    Number of Stairs, Within Home, Primary  none  -MM           Cognitive Assessment/Interventions    Additional Documentation  Cognitive Assessment/Intervention (Group)  -MM           Cognitive Assessment/Intervention- PT/OT    Affect/Mental Status (Cognitive)  WNL  -MM        Orientation Status (Cognition)  oriented x 4  -MM        Follows Commands (Cognition)  WNL  -MM         Cognitive Function (Cognitive)  WNL  -MM        Personal Safety Interventions  fall prevention program maintained;gait belt;muscle strengthening facilitated;nonskid shoes/slippers when out of bed;supervised activity  -MM           Safety Issues, Functional Mobility    Impairments Affecting Function (Mobility)  endurance/activity tolerance  -MM           Bed Mobility Assessment/Treatment    Bed Mobility Assessment/Treatment  supine-sit  -MM        Supine-Sit [place] (Bed Mobility)  supervision  -MM        Assistive Device (Bed Mobility)  head of bed elevated  -MM           Functional Mobility    Functional Mobility- Ind. Level  supervision required  -MM        Functional Mobility- Comment  Entire length of stephen and stairs  -MM           Transfer Assessment/Treatment    Transfer Assessment/Treatment  sit-stand transfer;stand-sit transfer  -MM           Sit-Stand Transfer    Sit-Stand [place] (Transfers)  supervision  -MM           Stand-Sit Transfer    Stand-Sit [place] (Transfers)  supervision  -MM           ADL Assessment/Intervention    BADL Assessment/Intervention  bathing;upper body dressing;lower body dressing;grooming  -MM           Bathing Assessment/Intervention    Bathing [place] Level  bathing skills;supervision;set up  -MM        Assistive Devices (Bathing)  grab bars/tub rail  -MM        Bathing Position  unsupported standing;supported standing;other (see comments) occassionally used grab bars  -MM           Upper Body Dressing Assessment/Training    Upper Body Dressing [place] Level  doff;pajama/robe;don;pull-over garment;conditional independence;set up  -MM        Upper Body Dressing Position  supported standing;unsupported standing occassionally used grab bars  -MM           Lower Body Dressing Assessment/Training    Lower Body Dressing [place] Level  don;doff;pants/bottoms;socks;shoes/slippers;undergarment;set up;conditional independence  -MM        Lower Body Dressing  Position  unsupported standing;supported standing occassionally used grab bars  -MM           Grooming Assessment/Training    [place] Level (Grooming)  hair care, combing/brushing;wash face, hands;conditional independence;set up nata rodriguezt  -MM        Grooming Position  supported standing;unsupported standing occassionally used grab bars  -MM           BADL Safety/Performance    Impairments, BADL Safety/Performance  endurance/activity tolerance  -MM           General ROM    GENERAL ROM COMMENTS  BUE WFL  -MM           MMT (Manual Muscle Testing)    General MMT Comments  BUE 4+/5  -MM           Sensory Assessment/Intervention    Sensory General Assessment  no sensation deficits identified  -MM           Positioning and Restraints    Pre-Treatment Position  in bed  -MM        Post Treatment Position  chair  -MM        In Chair  sitting;call light within reach;encouraged to call for assist;notified nsg  -MM           Pain Scale: Numbers Pre/Post-Treatment    Pain Scale: Numbers, Pretreatment  0/10 - no pain  -MM        Pain Scale: Numbers, Post-Treatment  0/10 - no pain  -MM        Pain Intervention(s)  Ambulation/increased activity;Repositioned  -MM           Plan of Care Review    Plan of Care Reviewed With  patient  -MM           Clinical Impression (OT)    Date of Referral to OT  09/05/19  -MM        Functional Level at Time of Evaluation (OT Eval)  excellent  -MM        Patient/Family Goals Statement (OT Eval)  return home this date  -MM        Criteria for Skilled Therapeutic Interventions Met (OT Eval)  no;no problems identified which require skilled intervention  -MM        Therapy Frequency (OT Eval)  evaluation only  -MM        Care Plan Review (OT)  evaluation/treatment results reviewed;care plan/treatment goals reviewed;risks/benefits reviewed;current/potential barriers reviewed;patient/other agree to care plan  -MM        Anticipated Discharge Disposition (OT)  home with assist  -MM            Vital Signs    Intra SpO2 (%)  93  -MM        O2 Delivery Intra Treatment  room air  -MM        Intra Patient Position  Standing while ambulating  -MM           Living Environment    Home Accessibility  stairs to enter home;tub/shower is not walk in  -MM          User Key  (r) = Recorded By, (t) = Taken By, (c) = Cosigned By    Initials Name Effective Dates    Robbi Del Rosario OTR/L 04/03/18 -           Occupational Therapy Education     Title: PT OT SLP Therapies (Done)     Topic: Occupational Therapy (Resolved)     Point: ADL training (Resolved)     Description: Instruct learner(s) on proper safety adaptation and remediation techniques during self care or transfers.   Instruct in proper use of assistive devices.    Learning Progress Summary           Patient Acceptance, E, VU by  at 9/5/2019  3:10 PM    Comment:  OT role, benefits, POC, d/c planning, home safety and endurance.                   Point: Precautions (Resolved)     Description: Instruct learner(s) on prescribed precautions during self-care and functional transfers.    Learning Progress Summary           Patient Acceptance, E, VU by  at 9/5/2019  3:10 PM    Comment:  OT role, benefits, POC, d/c planning, home safety and endurance.                               User Key     Initials Effective Dates Name Provider Type Discipline     04/03/18 -  Robbi Garcia, OTR/L Occupational Therapist OT                OT Recommendation and Plan  Outcome Summary/Treatment Plan (OT)  Anticipated Discharge Disposition (OT): home with assist  Therapy Frequency (OT Eval): evaluation only  Plan of Care Review  Plan of Care Reviewed With: patient  Plan of Care Reviewed With: patient  Outcome Summary: OT eval completed. Pt is alert and oriented x4 with no complaints. pt was supervision for all functional mobility this date. Pt was conditional independent to supervision with grooming, full body bathing and full body dressing. pt has good balance and does not  report increased fatigue or SOA. No skilled OT warranted at this time d/t independence level. OT to sign off. Recommended d/c home with assist. Pt hopes to d/c home this date.         Outcome Measures     Row Name 09/05/19 1500             How much help from another is currently needed...    Putting on and taking off regular lower body clothing?  4  -MM      Bathing (including washing, rinsing, and drying)  4  -MM      Toileting (which includes using toilet bed pan or urinal)  4  -MM      Putting on and taking off regular upper body clothing  4  -MM      Taking care of personal grooming (such as brushing teeth)  4  -MM      Eating meals  4  -MM      AM-PAC 6 Clicks Score (OT)  24  -MM         Functional Assessment    Outcome Measure Options  AM-PAC 6 Clicks Daily Activity (OT)  -MM        User Key  (r) = Recorded By, (t) = Taken By, (c) = Cosigned By    Initials Name Provider Type    MM Robbi Garcia, OTR/L Occupational Therapist          Time Calculation:   Time Calculation- OT     Row Name 09/05/19 1320             Time Calculation- OT    OT Start Time  1320  -MM      OT Stop Time  1450  -MM      OT Time Calculation (min)  90 min  -MM      Total Timed Code Minutes- OT  30 minute(s)  -MM      OT Received On  09/05/19  -MM         Timed Charges    14231 - OT Self Care/Mgmt Minutes  30  -MM        User Key  (r) = Recorded By, (t) = Taken By, (c) = Cosigned By    Initials Name Provider Type    MM Robbi Garcia, OTR/L Occupational Therapist        Therapy Suggested Charges     Code   Minutes Charges    97739 (CPT®) Hc Ot Neuromusc Re Education Ea 15 Min      53689 (CPT®) Hc Ot Ther Proc Ea 15 Min      67064 (CPT®) Hc Ot Therapeutic Act Ea 15 Min      33109 (CPT®) Hc Ot Manual Therapy Ea 15 Min      89939 (CPT®) Hc Ot Iontophoresis Ea 15 Min      97541 (CPT®) Hc Ot Elec Stim Ea-Per 15 Min      27576 (CPT®) Hc Ot Ultrasound Ea 15 Min      43970 (CPT®) Hc Ot Self Care/Mgmt/Train Ea 15 Min 30 2    Total  30 2         Therapy Charges for Today     Code Description Service Date Service Provider Modifiers Qty    [id number]  OT SELF CARE/MGMT/TRAIN EA 15 MIN 9/5/2019 Robbi Garcia, ANDRYR/L GO 2    [id number]  OT EVAL LOW COMPLEXITY 4 9/5/2019 Robbi Garcia, ANDRYR/L GO 1               OT Discharge Summary  Anticipated Discharge Disposition (OT): home with assist  Reason for Discharge: Independent, At baseline function  Outcomes Achieved: Refer to plan of care for updates on goals achieved  Discharge Destination: Home with assist    RADHA Miller/GABY  9/5/2019

## 2019-09-05 NOTE — DISCHARGE SUMMARY
[facility] Medicine Services  DISCHARGE SUMMARY       Date of Admission: 9/3/2019  Date of Discharge:  9/5/2019  Primary Care Physician: Ivan Kelly MD    Presenting Problem/History of Present Illness:  Pneumonia due to infectious organism, unspecified laterality, unspecified part of lung [J18.9]     Final Discharge Diagnoses:  Active Hospital Problems    Diagnosis   • **Pneumonia of left upper lobe due to Streptococcus (CMS/[facility])   • Type 2 diabetes mellitus, with long-term current use of insulin (CMS/[facility])   • End stage renal failure on dialysis (CMS/[facility])   • Diabetic nephropathy (CMS/[facility])   • Glaucoma   • HTN (hypertension)     Consults:   1.  Nephrology, Dr. Boyd    Procedures Performed: None    Pertinent Test Results:   Lab Results (last 7 days)     Procedure Component Value Units Date/Time    Respiratory Culture - Sputum, Cough [[id number]] Collected:  09/04/19 0802    Specimen:  Sputum from Cough Updated:  09/05/19 1418     Respiratory Culture Scant growth (1+) Normal Respiratory Jacquie     Gram Stain Greater than 25 WBCs per low power field      Few (2+) Mixed gram positive jacquie      Few (2+) Epithelial cells seen    POC Glucose Once [[id number]]  (Abnormal) Collected:  09/05/19 1141    Specimen:  Blood Updated:  09/05/19 1159     Glucose 134 mg/dL      Comment: : [id number] Merr[address] MorseMeter ID: [id number]       Blood Culture - Blood, Arm, Right [[id number]] Collected:  09/03/19 2113    Specimen:  Blood from Arm, Right Updated:  09/04/19 2230     Blood Culture No growth at 24 hours    Blood Culture - Blood, Arm, Right [[id number]] Collected:  09/03/19 2215    Specimen:  Blood from Arm, Right Updated:  09/04/19 2230     Blood Culture No growth at 24 hours    POC Glucose Once [[id number]]  (Abnormal) Collected:  09/04/19 2047    Specimen:  Blood Updated:  09/04/19 2117     Glucose 254 mg/dL      Comment: : [id number] Angel DasilvaherMeter ID: [id number]        POC Glucose Once [[id number]]  (Abnormal) Collected:  09/04/19 1631    Specimen:  Blood Updated:  09/04/19 1643     Glucose 267 mg/dL      Comment: : [id number] WhitneyMeter ID: [id number]       POC Glucose Once [[id number]]  (Abnormal) Collected:  09/04/19 1158    Specimen:  Blood Updated:  09/04/19 1227     Glucose 135 mg/dL      Comment: : [id number] WhitneyMeter ID: [id number]       Hepatitis B Surface Antigen [[id number]]  (Normal) Collected:  09/04/19 0532    Specimen:  Blood Updated:  09/04/19 1007     Hepatitis B Surface Ag Negative    POC Glucose Once [[id number]]  (Abnormal) Collected:  09/04/19 0749    Specimen:  Blood Updated:  09/04/19 0800     Glucose 229 mg/dL      Comment: : [id number] Mom Made FoodsMeter ID: [id number]       Hemoglobin A1c [[id number]]  (Abnormal) Collected:  09/04/19 0532    Specimen:  Blood Updated:  09/04/19 0712     Hemoglobin A1C 6.3 %     Narrative:       Less than 6.0           Non-Diabetic Range  6.0-7.0                 ADA Therapeutic Target  Greater than 7.0        Action Suggested    T4, Free [[id number]]  (Normal) Collected:  09/04/19 0532    Specimen:  Blood Updated:  09/04/19 0647     Free T4 0.87 ng/dL     Comprehensive Metabolic Panel [[id number]]  (Abnormal) Collected:  09/04/19 0532    Specimen:  Blood Updated:  09/04/19 0642     Glucose 226 mg/dL      BUN 49 mg/dL      Creatinine 7.54 mg/dL      Sodium 140 mmol/L      Potassium 3.9 mmol/L      Chloride 100 mmol/L      CO2 21.0 mmol/L      Calcium 8.9 mg/dL      Total Protein 7.0 g/dL      Albumin 4.20 g/dL      ALT (SGPT) 18 U/L      AST (SGOT) 36 U/L      Alkaline Phosphatase 70 U/L      Total Bilirubin 0.9 mg/dL      eGFR Non African Amer 5 mL/min/1.73      Comment: <15 Indicative of kidney failure.        eGFR   Amer --     Comment: <15 Indicative of kidney failure.        Globulin 2.8 gm/dL      A/G Ratio 1.5 g/dL      BUN/Creatinine Ratio 6.5     Anion Gap 19.0 mmol/L      Narrative:       GFR Normal >60  Chronic Kidney Disease <60  Kidney Failure <15    Magnesium [[id number]]  (Normal) Collected:  09/04/19 0532    Specimen:  Blood Updated:  09/04/19 0638     Magnesium 2.2 mg/dL     Phosphorus [[id number]]  (Abnormal) Collected:  09/04/19 0532    Specimen:  Blood Updated:  09/04/19 0638     Phosphorus 6.0 mg/dL     CBC Auto Differential [[id number]]  (Abnormal) Collected:  09/04/19 0532    Specimen:  Blood Updated:  09/04/19 0621     WBC 7.18 10*3/mm3      RBC 3.94 10*6/mm3      Hemoglobin 12.5 g/dL      Hematocrit 37.8 %      MCV 95.9 fL      MCH 31.7 pg      MCHC 33.1 g/dL      RDW 14.5 %      RDW-SD 51.0 fl      MPV 9.4 fL      Platelets 197 10*3/mm3      Neutrophil % 93.1 %      Lymphocyte % 5.4 %      Monocyte % 1.1 %      Eosinophil % 0.0 %      Basophil % 0.1 %      Immature Grans % 0.3 %      Neutrophils, Absolute 6.68 10*3/mm3      Lymphocytes, Absolute 0.39 10*3/mm3      Monocytes, Absolute 0.08 10*3/mm3      Eosinophils, Absolute 0.00 10*3/mm3      Basophils, Absolute 0.01 10*3/mm3      Immature Grans, Absolute 0.02 10*3/mm3      nRBC 0.0 /100 WBC     Legionella Antigen, Urine - Urine, Urine, Clean Catch [[id number]]  (Normal) Collected:  09/04/19 0311    Specimen:  Urine, Clean Catch Updated:  09/04/19 0437     LEGIONELLA ANTIGEN, URINE Negative    S. Pneumo Ag Urine or CSF - Urine, Urine, Clean Catch [[id number]]  (Abnormal) Collected:  09/04/19 0311    Specimen:  Urine, Clean Catch Updated:  09/04/19 0437     Strep Pneumo Ag Positive    Narrative:       Streptococcus pneumoniae (pneumococcal pneumonia) vaccine may cause false-positive results, especially in patients who have received the vaccine within 5 days of having the test performed.    Urinalysis, Microscopic Only - Urine, Clean Catch [[id number]]  (Abnormal) Collected:  09/04/19 0311    Specimen:  Urine, Clean Catch Updated:  09/04/19 0408     RBC, UA 0-2 /HPF      WBC, UA 0-2 /HPF      Bacteria, UA Trace /HPF       Squamous Epithelial Cells, UA 0-2 /HPF      Hyaline Casts, UA None Seen /LPF      Methodology Manual Light Microscopy    Urinalysis With Culture If Indicated - Urine, Clean Catch [[id number]]  (Abnormal) Collected:  09/04/19 0311    Specimen:  Urine, Clean Catch Updated:  09/04/19 0358     Color, UA Yellow     Appearance, UA Clear     pH, UA 6.5     Specific Gravity, UA 1.020     Glucose,  mg/dL (Trace)     Ketones, UA 15 mg/dL (1+)     Bilirubin, UA Negative     Blood, UA Small (1+)     Protein, UA >=300 mg/dL (3+)     Leuk Esterase, UA Negative     Nitrite, UA Negative     Urobilinogen, UA 0.2 E.U./dL    POC Glucose Once [[id number]]  (Abnormal) Collected:  09/04/19 0252    Specimen:  Blood Updated:  09/04/19 0303     Glucose 170 mg/dL      Comment: : [id number] Nelson ChelseaMeter ID: [id number]       Phosphorus [[id number]]  (Abnormal) Collected:  09/03/19 2214    Specimen:  Blood Updated:  09/04/19 0131     Phosphorus 5.9 mg/dL     aPTT [[id number]]  (Normal) Collected:  09/03/19 2214    Specimen:  Blood Updated:  09/03/19 2308     PTT 31.5 seconds     Protime-INR [[id number]]  (Normal) Collected:  09/03/19 2214    Specimen:  Blood Updated:  09/03/19 2308     Protime 13.0 Seconds      INR 0.95    TSH [[id number]]  (Normal) Collected:  09/03/19 2214    Specimen:  Blood Updated:  09/03/19 2306     TSH 1.410 uIU/mL     Troponin [[id number]]  (Normal) Collected:  09/03/19 2214    Specimen:  Blood Updated:  09/03/19 2249     Troponin I 0.033 ng/mL     BNP [[id number]]  (Abnormal) Collected:  09/03/19 2214    Specimen:  Blood Updated:  09/03/19 2249     proBNP 3,360.0 pg/mL     Comprehensive Metabolic Panel [[id number]]  (Abnormal) Collected:  09/03/19 2214    Specimen:  Blood Updated:  09/03/19 2237     Glucose 109 mg/dL      BUN 45 mg/dL      Creatinine 7.55 mg/dL      Sodium 143 mmol/L      Potassium 3.9 mmol/L      Chloride 98 mmol/L      CO2 25.0 mmol/L      Calcium 9.3 mg/dL      Total Protein 7.8 g/dL       Albumin 4.60 g/dL      ALT (SGPT) 20 U/L      AST (SGOT) 32 U/L      Alkaline Phosphatase 74 U/L      Total Bilirubin 1.0 mg/dL      eGFR Non African Amer 5 mL/min/1.73      Comment: <15 Indicative of kidney failure.        eGFR   Amer --     Comment: <15 Indicative of kidney failure.        Globulin 3.2 gm/dL      A/G Ratio 1.4 g/dL      BUN/Creatinine Ratio 6.0     Anion Gap 20.0 mmol/L     Narrative:       GFR Normal >60  Chronic Kidney Disease <60  Kidney Failure <15    Lipase [[id number]]  (Normal) Collected:  09/03/19 2214    Specimen:  Blood Updated:  09/03/19 2237     Lipase 194 U/L     Magnesium [[id number]]  (Abnormal) Collected:  09/03/19 2214    Specimen:  Blood Updated:  09/03/19 2237     Magnesium 2.3 mg/dL     Lactic Acid, Plasma [[id number]]  (Normal) Collected:  09/03/19 2214    Specimen:  Blood Updated:  09/03/19 2235     Lactate 1.3 mmol/L     CBC & Differential [[id number]] Collected:  09/03/19 2214    Specimen:  Blood Updated:  09/03/19 2225    Narrative:       The following orders were created for panel order CBC & Differential.  Procedure                               Abnormality         Status                     ---------                               -----------         ------                     CBC Auto Differential[[id number]]        Abnormal            Final result                 Please view results for these tests on the individual orders.    CBC Auto Differential [[id number]]  (Abnormal) Collected:  09/03/19 2214    Specimen:  Blood Updated:  09/03/19 2225     WBC 6.37 10*3/mm3      RBC 4.20 10*6/mm3      Hemoglobin 13.3 g/dL      Hematocrit 39.6 %      MCV 94.3 fL      MCH 31.7 pg      MCHC 33.6 g/dL      RDW 14.6 %      RDW-SD 50.7 fl      MPV 8.8 fL      Platelets 187 10*3/mm3      Neutrophil % 74.4 %      Lymphocyte % 20.1 %      Monocyte % 4.7 %      Eosinophil % 0.2 %      Basophil % 0.3 %      Immature Grans % 0.3 %      Neutrophils, Absolute 4.74 10*3/mm3      Lymphocytes,  Absolute 1.28 10*3/mm3      Monocytes, Absolute 0.30 10*3/mm3      Eosinophils, Absolute 0.01 10*3/mm3      Basophils, Absolute 0.02 10*3/mm3      Immature Grans, Absolute 0.02 10*3/mm3      nRBC 0.0 /100 WBC         Imaging Results (last 7 days)     Procedure Component Value Units Date/Time    CT Abdomen Pelvis Without Contrast [[id number]] Collected:  09/03/19 2158     Updated:  09/03/19 2204    Narrative:       EXAMINATION: CT ABDOMEN PELVIS WO CONTRAST-      9/3/2019 9:51 PM CDT     HISTORY: Abdominal pain with nausea and vomiting.     In order to have a CT radiation dose as low as reasonably achievable  Automated Exposure Control was utilized for adjustment of the mA and/or  KV according to patient size.     DLP in mGycm= 255.     Noncontrast abdomen/pelvis CT.     No renal or ureteral stone.  No hydronephrosis.     Linear band of infiltrate along left heart border is incompletely  visualized on this abdomen/pelvis CT the head the appearance of  pneumonia on chest x-ray.  Normal heart size.  The right lower lobe is clear.     Cholecystectomy clips are present.  Normal noncontrast appearance of the liver, pancreas, and spleen.  Normal and symmetric adrenal glands.     Aortic calcification with no aneurysm.     No bowel obstruction.  No appendicitis, diverticulitis, or colitis.     No pelvic mass or fluid.     Summary:  1. Findings suspicious for lingular infiltrate along the left heart  border. This was better visualized on the recent chest x-ray.  2. No mass, fluid collection, or inflammatory process is seen within the  abdomen pelvis.                                   This report was finalized on 09/03/2019 22:01 by Dr. Dharmesh Zuleta MD.    XR Chest 1 View [[id number]] Collected:  09/03/19 2144     Updated:  09/03/19 2150    Narrative:       EXAMINATION: XR CHEST 1 VW-     9/3/2019 9:37 PM CDT     HISTORY: Short of breath.     One view chest x-ray compared with 06/08/2006.     Patchy infiltrate along the left  heart border probably involves the  lingula.     The upper lobes are clear.     There is no pneumothorax or heart failure.     Summary:  1. Left-sided pneumonia along the left heart border probably within a  lingular segment of the left upper lobe.        This report was finalized on 09/03/2019 21:47 by Dr. Dharmesh Zuleta MD.        History of Present Illness on Day of Discharge:   Patient is currently sitting up in bed talking on her cell phone.  She states she feels much better.  She is eager to be discharged home as she feels she will rest better at home.  She has a boyfriend that helps care for her.  She denies any complaints at this time.  She has been afebrile for greater than 24 hours.  She will resume dialysis on Saturday.    Hospital Course:  The patient is a 59 y.o. female who presented to [facility] with cough, nausea and vomiting.  Patient has a past medical history significant for end-stage renal disease on hemodialysis, type 2 diabetes, hypertension and former tobacco abuse.  The patient presented with a one week history of productive cough with green sputum, nausea and shortness of breath.  She also complained of no bowel movement in one week.  She was evaluated in the emergency department and found to have a linear infiltrate on her chest xray involving the lingula of the left lung.  She also continues to smoke 2 packs per day as well.  She had essentially a negative CT scan of her abdomen and pelvis except for the abnormality consistent with pneumonia from her CXR.  She was admitted for pneumonia and further treatment.  She was started on Doxycycline and Rocephin.  Urinary antigen's were sent off for strep pneumonia and legionella.  She was also felt to have some degree of COPD exacerbation along with this and was started on some steroids.  She was seen by Dr. Lees for continuation of her hemodialysis since she missed Monday's treatment.     She was found to be positive for strep  "pneumonia and the Doxycycline was discontinued and she was treated with Rocephin monotherapy.  She was also educated multiple times throughout her hospitalization for smoking cessation.      She was been weaned down on her steroids and currently not requiring any oxygen therapy.  She was given a HOG enema and had good results with that.  She has had dialysis yesterday and today.  Tolerated both treatments well.  She has been afebrile and doing well today.  She states that she feels much better and is eager to be discharged home.  She states that she will feel so much better at home.      She will be discharged home today in stable condition.  She will follow up with ANTHONY Kiser in one week.  She will be prescribed Omnicef to complete her antibiotic therapy.     Condition on Discharge: Stable    Physical Exam on Discharge:  /71 (BP Location: Right arm, Patient Position: Sitting)   Pulse 98   Temp 98.5 °F (36.9 °C) (Oral)   Resp 16   Ht 149.9 cm (59\")   Wt 61.7 kg (136 lb 0.4 oz)   SpO2 92%   BMI 27.47 kg/m²   Physical Exam   Constitutional: She is oriented to person, place, and time. She appears well-developed and well-nourished.   Sitting up in bed.  No acute distress.   HENT:   Head: Normocephalic and atraumatic.   Eyes: Conjunctivae and EOM are normal. Pupils are equal, round, and reactive to light.   Neck: Neck supple. No JVD present. No thyromegaly present.   Cardiovascular: Normal rate, regular rhythm, normal heart sounds and intact distal pulses. Exam reveals no gallop and no friction rub.   No murmur heard.  Pulmonary/Chest: Effort normal and breath sounds normal. No respiratory distress. She has no wheezes. She has no rales. She exhibits no tenderness.   Room air   Abdominal: Soft. Bowel sounds are normal. She exhibits no distension. There is no tenderness. There is no rebound and no guarding.   Genitourinary:   Genitourinary Comments: Voiding.   Musculoskeletal: Normal range of motion. " She exhibits no edema, tenderness or deformity.   Lymphadenopathy:     She has no cervical adenopathy.   Neurological: She is alert and oriented to person, place, and time.   Skin: Skin is warm and dry. No rash noted.   Psychiatric: She has a normal mood and affect. Her behavior is normal. Judgment and thought content normal.   Nursing note and vitals reviewed.    Discharge Disposition:  Home or Self Care    Discharge Medications:     Discharge Medications      New Medications      Instructions Start Date   cefdinir 300 MG capsule  Commonly known as:  OMNICEF   300 mg, Oral, 2 Times Daily      guaiFENesin 600 MG 12 hr tablet  Commonly known as:  MUCINEX   1,200 mg, Oral, Every 12 Hours Scheduled      predniSONE 20 MG tablet  Commonly known as:  DELTASONE   20 mg, Oral, Daily, Take 40 mg daily for 3 days, 20 mg daily for 3 days, 10 mg daily for 3 days then stop         Continue These Medications      Instructions Start Date   aspirin 81 MG EC tablet   81 mg, Oral, Daily      benzonatate 100 MG capsule  Commonly known as:  TESSALON   100 mg, Oral, 3 Times Daily PRN      brimonidine 0.2 % ophthalmic solution  Commonly known as:  ALPHAGAN   1 drop, Left Eye, 3 Times Daily      calcitriol 0.25 MCG capsule  Commonly known as:  ROCALTROL   0.25 mcg, Oral, Take As Directed, Only given on dialysis days (Tuesday, Thursday, Saturday)      cetirizine 10 MG tablet  Commonly known as:  zyrTEC   10 mg, Oral, Nightly PRN      cholecalciferol 1000 units tablet  Commonly known as:  VITAMIN D3   1,000 Units, Oral, Daily      dorzolamide 2 % ophthalmic solution  Commonly known as:  TRUSOPT   1 drop, Left Eye, 2 Times Daily      famotidine 20 MG tablet  Commonly known as:  PEPCID   20 mg, Oral, 2 Times Daily      fluticasone 50 MCG/ACT nasal spray  Commonly known as:  FLONASE   1 spray, Nasal, 2 Times Daily      homatropine 5 % ophthalmic solution   1 drop, Left Eye, Daily      insulin glargine 100 UNIT/ML injection  Commonly known as:   LANTUS   10 Units, Subcutaneous, Nightly      lidocaine-prilocaine 2.5-2.5 % cream  Commonly known as:  EMLA   1 application, Topical, Take As Directed, Apply to access area 30 minutes prior to dialysis.      losartan 25 MG tablet  Commonly known as:  COZAAR   25 mg, Oral, Daily      midodrine 5 MG tablet  Commonly known as:  PROAMATINE   5 mg, Oral, Take As Directed, as needed for low blood pressure during dialysis.       pravastatin 10 MG tablet  Commonly known as:  PRAVACHOL   10 mg, Oral, Nightly      RHOPRESSA 0.02 % solution  Generic drug:  Netarsudil Dimesylate   1 drop, Left Eye, Nightly      sertraline 25 MG tablet  Commonly known as:  ZOLOFT   25 mg, Oral, Nightly      sevelamer 800 MG tablet  Commonly known as:  RENAGEL   1,600 mg, Oral, 3 Times Daily With Meals      timolol 0.5 % ophthalmic solution  Commonly known as:  TIMOPTIC   1 drop, Left Eye, Every Morning      TRAVATAN Z 0.004 % solution ophthalmic solution  Generic drug:  travoprost (BAK free)   1 drop, Left Eye, Nightly           Discharge Diet:   Diet Instructions     Diet: Regular, Consistent Carbohydrate, Cardiac, Renal      Discharge Diet:   Regular  Consistent Carbohydrate  Cardiac  Renal           Activity at Discharge:   Activity Instructions     Activity as Tolerated          Discharge Care Plan/Instructions:   1.  Resume normal dialysis schedule  2.  Follow-up with Dr. Kelly/ANTHONY Kiser in 1 week    Follow-up Appointments:   No future appointments.    Test Results Pending at Discharge: None    ANTHONY Nunes  09/05/19  1:32 PM    Time: 35 minutes

## 2019-09-05 NOTE — PLAN OF CARE
Problem: Patient Care Overview  Goal: Plan of Care Review  Outcome: Ongoing (interventions implemented as appropriate)   09/05/19 5028   Coping/Psychosocial   Plan of Care Reviewed With patient   Plan of Care Review   Progress no change   OTHER   Outcome Summary PT eval completed. Pt alert and oriented x4 with no complaints upon arrival. Pt performed bed mob with HOB elevated and sup, and performed t/f and gait with sup as well. Pt performed stair climbing with CGA, R hand raill and no LOB. Pt with no concerns with going home. Pt with no needs for PT at this time. Recommend d/c home with assist.

## 2019-09-05 NOTE — PLAN OF CARE
Problem: Patient Care Overview  Goal: Plan of Care Review  Outcome: Ongoing (interventions implemented as appropriate)   09/05/19 0535   Coping/Psychosocial   Plan of Care Reviewed With patient   Plan of Care Review   Progress no change   OTHER   Outcome Summary VSS. No c/o pain this shift. O2 sat WNL on 2L O2 per NC.  Lung sounds congested with inspiratory and expiratory wheezes noted.  Blood sugar 254 this shift. Scheduled and sliding scale insulin given as ordered. NSR per telemetry.  100 mls urine output noted so far this shift.  Pt. to receive dialysis again today per shift report.  Safety maintained.  Call light within reach.  Will continue to monitor.     Goal: Individualization and Mutuality  Outcome: Ongoing (interventions implemented as appropriate)   09/05/19 0535   Mutuality/Individual Preferences   What Anxieties, Fears, Concerns, or Questions Do You Have About Your Care? Pt. concerned she is not receiving her eye gtts as she takes at home and may not be receiving the one supplied by her eye dr due to her insurance card not paying for it.   Individualization   Patient Specific Goals (Include Timeframe) Decreased SOA this shift.   Patient Specific Interventions O2 @ 2L per NC in place.     Goal: Interprofessional Rounds/Family Conf  Outcome: Ongoing (interventions implemented as appropriate)   09/05/19 0535   Interdisciplinary Rounds/Family Conf   Participants nursing;patient       Problem: Pneumonia (Adult)  Goal: Signs and Symptoms of Listed Potential Problems Will be Absent, Minimized or Managed (Pneumonia)  Outcome: Ongoing (interventions implemented as appropriate)   09/05/19 0535   Goal/Outcome Evaluation   Problems Assessed (Pneumonia) all   Problems Present (Pneumonia) infection progression;respiratory compromise

## 2019-09-05 NOTE — ACP (ADVANCE CARE PLANNING)
Date of First Steps ACP interview: 9/5/2019  Location of interview: Pt's room  First Steps ACP Facilitator: Elena Silva RN  Referral Source: nurse  Present for facilitation: Patient    SUMMARY OF ADVANCE CARE PLANNING DISCUSSION:  Mini visited for consult for First Steps facilitation. This was my 5th visit to the room .  She was not available at other times.  She received 3 phone calls during my visit and did not choose to postpone their conversations , so information was left for her to review along with contact numbers for facilitators if she chooses to complete a document.          Education materials were provided on: Advance Care Planning and Healthcare Agent Selection    Advance care/living will document finished during this facilitation? no    Time spent on preparation, facilitation and documentation was 31-60 minutes.    RECOMMENDATIONS/FOLLOW-UP:  Information left for pt's review along with contact numbers for facilitators.    CONSULT/NOTE ROUTED  yes    Elena Silva RN

## 2019-09-05 NOTE — PROGRESS NOTES
Discharge Planning Assessment  [facility]     Patient Name: Mini Gentile  MRN: [id number]  Today's Date: 9/5/2019    Admit Date: 9/3/2019    Discharge Needs Assessment     Row Name 09/05/19 1145       Living Environment    Lives With  alone    Current Living Arrangements  home/apartment/condo    Primary Care Provided by  self    Provides Primary Care For  no one    Family Caregiver if Needed  none    Able to Return to Prior Arrangements  yes       Resource/Environmental Concerns    Resource/Environmental Concerns  none    Transportation Concerns  car, none       Transition Planning    Patient/Family Anticipates Transition to  home    Patient/Family Anticipated Services at Transition  community agency    Transportation Anticipated  family or friend will provide;public transportation       Discharge Needs Assessment    Readmission Within the Last 30 Days  no previous admission in last 30 days    Concerns to be Addressed  denies needs/concerns at this time    Equipment Currently Used at Home  none    Anticipated Changes Related to Illness  none    Equipment Needed After Discharge  none    Outpatient/Agency/Support Group Needs  outpatient hemodialysis    Current Discharge Risk  lives alone;chronically ill        Discharge Plan     Row Name 09/05/19 1148       Plan    Plan  Home    Patient/Family in Agreement with Plan  yes    Plan Comments  Spoke with pt to assess for home needs. Pt lives at home alone and plans same. Pt goes to Dialysis on [address] and uses [facility] for transportation. She does not have DME needs, no HH felt needed. Pt does have RX coverage and PCP. She says her family will transport her home. Pt has been set up with [facility] upon discharge.  Will follow.         Destination      No service coordination in this encounter.      Durable Medical Equipment      No service coordination in this encounter.      Dialysis/Infusion      No service coordination in this encounter.      Home Medical Care       No service coordination in this encounter.      Therapy      No service coordination in this encounter.      Community Resources      No service coordination in this encounter.          Demographic Summary    No documentation.       Functional Status    No documentation.       Psychosocial    No documentation.       Abuse/Neglect    No documentation.       Legal    No documentation.       Substance Abuse    No documentation.       Patient Forms    No documentation.           SACHI Douglas

## 2019-09-05 NOTE — THERAPY EVALUATION
Patient Name: Mini Gentile  : 1960    MRN: [id number]                              Today's Date: 2019       Admit Date: 9/3/2019    Visit Dx:     ICD-10-CM ICD-9-CM   1. Pneumonia due to infectious organism, unspecified laterality, unspecified part of lung J18.9 136.9     484.8   2. Chronic kidney disease, unspecified CKD stage N18.9 585.9   3. 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   • Eustachian tube dysfunction   • Chronic otitis media   • Pneumonia of left upper lobe due to Streptococcus (CMS/formerly [facility])   • End stage renal failure on dialysis (CMS/formerly [facility])   • Diabetic nephropathy (CMS/formerly [facility])   • Glaucoma   • HTN (hypertension)   • Proteinuria   • Type 2 diabetes mellitus, with long-term current use of insulin (CMS/formerly [facility])     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    • Chronic otitis media    • Diabetes (CMS/formerly [facility])    • Eustachian tube dysfunction    • Glaucoma    • HTN (hypertension)    • Kidney failure     on dialysis   • Liver disorder    • Mucoid otitis media      Past Surgical History:   Procedure Laterality Date   • ARTERIOVENOUS FISTULA     • CATARACT EXTRACTION WITH INTRAOCULAR LENS IMPLANT     •  SECTION     • CHOLECYSTECTOMY     • MYRINGOTOMY W/ TUBES Bilateral    • MYRINGOTOMY W/ TUBES Right    • TUBAL ABDOMINAL LIGATION          PT ASSESSMENT (last 12 hours)      Physical Therapy Evaluation     Row Name 19 1320          Physical Therapy Discharge Summary    Additional Documentation  Discharge Summary, PT Eval (Group)  -SB     Row Name 19 1320          Discharge Summary, PT Eval    Reason for Discharge (PT Discharge Summary)  no further needs identified  -SB     Outcomes Achieved Upon Discharge (PT Discharge Summary)  evaluation only  -SB       User Key  (r) = Recorded By, (t) = Taken By, (c) = Cosigned By    Initials Name Provider Type     SB Gracy Timmons, PT Physical Therapist          General Information     Row Name 09/05/19 1320          PT Evaluation Time/Intention    Document Type  evaluation presents with cough, nausea and SOB; dx pneumonia of infectious organism  -SB     Mode of Treatment  physical therapy  -SB     Row Name 09/05/19 1320          General Information    Patient Profile Reviewed?  yes  -SB     Prior Level of Function  independent:;all household mobility;community mobility;transfer;bed mobility;dressing;bathing;grooming;shopping  -SB     Existing Precautions/Restrictions  fall  -SB     Barriers to Rehab  none identified  -SB     Row Name 09/05/19 1320          Relationship/Environment    Lives With  significant other  -SB     Row Name 09/05/19 1320          Resource/Environmental Concerns    Current Living Arrangements  home/apartment/condo step over tub  -SB     Row Name 09/05/19 1320          Home Main Entrance    Number of Stairs, Main Entrance  four  -SB     Stair Railings, Main Entrance  railings on both sides of stairs  -SB     Row Name 09/05/19 1320          Stairs Within Home, Primary    Number of Stairs, Within Home, Primary  none  -SB     Row Name 09/05/19 1320          Cognitive Assessment/Intervention- PT/OT    Orientation Status (Cognition)  oriented x 4  -SB     Row Name 09/05/19 1320          Safety Issues, Functional Mobility    Impairments Affecting Function (Mobility)  endurance/activity tolerance  -SB       User Key  (r) = Recorded By, (t) = Taken By, (c) = Cosigned By    Initials Name Provider Type    SB Gracy Timmons, PT Physical Therapist        Mobility     Row Name 09/05/19 1320          Bed Mobility Assessment/Treatment    Bed Mobility Assessment/Treatment  supine-sit  -SB     Supine-Sit [place] (Bed Mobility)  supervision  -SB     Assistive Device (Bed Mobility)  head of bed elevated  -SB     Row Name 09/05/19 1320          Sit-Stand Transfer    Sit-Stand [place] (Transfers)  supervision   -SB     Row Name 09/05/19 1320          Gait/Stairs Assessment/Training    [place] Level (Gait)  supervision  -SB     Distance in Feet (Gait)  300  -SB     Pattern (Gait)  step-through  -SB     [place] Level (Stairs)  contact guard  -SB     Handrail Location (Stairs)  right side (ascending)  -SB     Number of Steps (Stairs)  8  -SB     Ascending Technique (Stairs)  step-over-step  -SB     Descending Technique (Stairs)  step-to-step  -SB       User Key  (r) = Recorded By, (t) = Taken By, (c) = Cosigned By    Initials Name Provider Type    SB Gracy Timmons, PT Physical Therapist        Obj/Interventions     Row Name 09/05/19 1320          General ROM    GENERAL ROM COMMENTS  BLE WFL  -SB     Row Name 09/05/19 1320          MMT (Manual Muscle Testing)    General MMT Comments  BLE 4/5  -SB     Row Name 09/05/19 1320          Static Sitting Balance    Level of [place] (Unsupported Sitting, Static Balance)  independent  -SB     Sitting Position (Unsupported Sitting, Static Balance)  sitting on edge of bed  -SB     Row Name 09/05/19 1320          Static Standing Balance    Level of [place] (Supported Standing, Static Balance)  supervision  -SB     Row Name 09/05/19 1320          Sensory Assessment/Intervention    Sensory General Assessment  no sensation deficits identified  -SB       User Key  (r) = Recorded By, (t) = Taken By, (c) = Cosigned By    Initials Name Provider Type    Gracy Ag, PT Physical Therapist        Goals/Plan    No documentation.       Clinical Impression     Row Name 09/05/19 1320          Pain Assessment    Additional Documentation  Pain Scale: Numbers Pre/Post-Treatment (Group)  -SB     [facility] Name 09/05/19 1320          Pain Scale: Numbers Pre/Post-Treatment    Pain Scale: Numbers, Pretreatment  0/10 - no pain  -SB     Pain Scale: Numbers, Post-Treatment  0/10 - no pain  -SB     Pain Intervention(s)  Ambulation/increased activity;Repositioned  -SB     Row Name 09/05/19 1320           Plan of Care Review    Plan of Care Reviewed With  patient  -SB     Row Name 09/05/19 1320          Physical Therapy Clinical Impression    Patient/Family Goals Statement (PT Clinical Impression)  go home  -SB     Criteria for Skilled Interventions Met (PT Clinical Impression)  no  -SB     Row Name 09/05/19 1320          Vital Signs    Intra SpO2 (%)  93  -SB     O2 Delivery Intra Treatment  room air  -SB     Row Name 09/05/19 1320          Positioning and Restraints    Pre-Treatment Position  in bed  -SB     Post Treatment Position  chair  -SB     In Chair  sitting;call light within reach;with OT;encouraged to call for assist  -SB       User Key  (r) = Recorded By, (t) = Taken By, (c) = Cosigned By    Initials Name Provider Type    Gracy Ag PT Physical Therapist        Outcome Measures     Row Name 09/05/19 1320          How much help from another person do you currently need...    Turning from your back to your side while in flat bed without using bedrails?  4  -SB     Moving from lying on back to sitting on the side of a flat bed without bedrails?  4  -SB     Moving to and from a bed to a chair (including a wheelchair)?  4  -SB     Standing up from a chair using your arms (e.g., wheelchair, bedside chair)?  4  -SB     Climbing 3-5 steps with a railing?  4  -SB     To walk in hospital room?  4  -SB     AM-PAC 6 Clicks Score (PT)  24  -SB     Row Name 09/05/19 1320          Functional Assessment    Outcome Measure Options  AM-PAC 6 Clicks Basic Mobility (PT)  -SB       User Key  (r) = Recorded By, (t) = Taken By, (c) = Cosigned By    Initials Name Provider Type    Gracy Ag PT Physical Therapist        Physical Therapy Education     Title: PT OT SLP Therapies (Done)     Topic: Physical Therapy (Done)     Point: Mobility training (Done)     Learning Progress Summary           Patient Acceptance, E, VU by ANTHONY at 9/5/2019  1:44 PM    Comment:  pt edu on POC, benefits of act and dc plans                    Point: Home exercise program (Done)     Learning Progress Summary           Patient Acceptance, E, VU by SB at 9/5/2019  1:44 PM    Comment:  pt edu on POC, benefits of act and dc plans                   Point: Precautions (Done)     Learning Progress Summary           Patient Acceptance, E, VU by SB at 9/5/2019  1:44 PM    Comment:  pt edu on POC, benefits of act and dc plans                               User Key     Initials Effective Dates Name Provider Type Discipline     08/31/18 -  Gracy Timmons PT Physical Therapist PT              PT Recommendation and Plan     Outcome Summary/Treatment Plan (PT)  Anticipated Discharge Disposition (PT): home with assist  Reason for Discharge (PT Discharge Summary): no further needs identified  Plan of Care Reviewed With: patient  Progress: no change  Outcome Summary: PT eval completed. Pt alert and oriented x4 with no complaints upon arrival. Pt performed bed mob with HOB elevated and sup, and performed t/f and gait with sup as well. Pt performed stair climbing with CGA, R hand raill and no LOB. Pt with no concerns with going home. Pt with no needs for PT at this time. Recommend d/c home with assist.      Time Calculation:   PT Charges     Row Name 09/05/19 1430             Time Calculation    Start Time  1320  -SB      Stop Time  1402  -SB      Time Calculation (min)  42 min  -SB      PT Received On  09/05/19  -SB        User Key  (r) = Recorded By, (t) = Taken By, (c) = Cosigned By    Initials Name Provider Type    SB Gracy Timmons PT Physical Therapist        Therapy Charges for Today     Code Description Service Date Service Provider Modifiers Qty    [id number] HC PT EVAL LOW COMPLEXITY 3 9/5/2019 Gracy Timmons PT GP 1          PT G-Codes  Outcome Measure Options: AM-PAC 6 Clicks Basic Mobility (PT)  AM-PAC 6 Clicks Score (PT): 24    Gracy Timmons PT  9/5/2019

## 2019-09-05 NOTE — PROGRESS NOTES
Nephrology ([facility]) Progress Note      Patient:  Mini Gentile  YOB: 1960  Date of Service: 9/5/2019  MRN: [id number]   Acct: [id number]   Primary Care Physician: Ivan Kelly MD  Advance Directive:   Code Status and Medical Interventions:   Ordered at: 09/04/19 0013     Level Of Support Discussed With:    Patient     Code Status:    CPR     Medical Interventions (Level of Support Prior to Arrest):    Full     Admit Date: 9/3/2019       Hospital Day: 2  Referring Provider: Tejinder Kent MD      Patient personally seen and examined.  Complete chart including Consults, Notes, Operative Reports, Labs, Cardiology, and Radiology studies reviewed as able.        Subjective:  Mini Gentile is a 59 y.o. female  whom we were consulted for end stage renal disease on maintenance hemodialysis.  Receives her HD at [facility] on Tuesday Thursday Saturday.  Missed 9/03 treatment due to illness.  No other recent issues with dialysis.  History of type 2 diabetes with nephropathy, hypertension, tobacco abuse.  Presented to ER with one week history of dyspnea, productive cough, nausea.  Admitted for pneumonia.  No issues with dialysis on 9/04    Today seen during dialysis. No new overnight issues.   Dialysis   Pt was seen on RRT; tolerating well  Modality: Hemodialysis  Access: Arterial Venous Fistula  Location: left upper  QB: 400  QD: 500  UF: 1000    Allergies:  Bupropion; Chantix [varenicline]; Sulfa antibiotics; Azithromycin; Levofloxacin; and Penicillins    Home Meds:  Medications Prior to Admission   Medication Sig Dispense Refill Last Dose   • aspirin 81 MG EC tablet Take 81 mg by mouth Daily.   9/3/2019 at Unknown time   • brimonidine (ALPHAGAN) 0.2 % ophthalmic solution Administer 1 drop into the left eye 3 (Three) Times a Day.   9/3/2019 at Unknown time   • calcitriol (ROCALTROL) 0.25 MCG capsule Take 0.25 mcg by mouth Take As Directed.  Only given on dialysis days (Tuesday, Thursday, Saturday)   8/31/2019 at Unknown time   • cetirizine (zyrTEC) 10 MG tablet Take 10 mg by mouth At Night As Needed (allergies).   Past Week at Unknown time   • cholecalciferol (VITAMIN D3) 1000 units tablet Take 1,000 Units by mouth Daily.   9/3/2019 at Unknown time   • dorzolamide (TRUSOPT) 2 % ophthalmic solution Administer 1 drop into the left eye 2 (Two) Times a Day.   9/3/2019 at Unknown time   • famotidine (PEPCID) 20 MG tablet Take 20 mg by mouth 2 (Two) Times a Day.   9/3/2019 at Unknown time   • fluticasone (FLONASE) 50 MCG/ACT nasal spray 1 spray into the nostril(s) as directed by provider 2 (Two) Times a Day.   9/3/2019 at Unknown time   • homatropine 5 % ophthalmic solution Administer 1 drop into the left eye Daily.   9/3/2019 at Unknown time   • insulin glargine (LANTUS) 100 UNIT/ML injection Inject 10 Units under the skin Every Night.   9/3/2019 at Unknown time   • lidocaine-prilocaine (EMLA) 2.5-2.5 % cream Apply 1 application topically to the appropriate area as directed Take As Directed. Apply to access area 30 minutes prior to dialysis.    8/31/2019 at Unknown time   • losartan (COZAAR) 25 MG tablet Take 25 mg by mouth Daily.   9/3/2019 at Unknown time   • Netarsudil Dimesylate (RHOPRESSA) 0.02 % solution Administer 1 drop into the left eye Every Night.   9/3/2019 at Unknown time   • pravastatin (PRAVACHOL) 10 MG tablet Take 10 mg by mouth Every Night.   9/3/2019 at Unknown time   • sertraline (ZOLOFT) 25 MG tablet Take 25 mg by mouth Every Night.   9/3/2019 at Unknown time   • sevelamer (RENAGEL) 800 MG tablet Take 1,600 mg by mouth 3 (Three) Times a Day With Meals.   9/3/2019 at Unknown time   • timolol (TIMOPTIC) 0.5 % ophthalmic solution Administer 1 drop into the left eye Every Morning.   9/3/2019 at Unknown time   • travoprost, BAK free, (TRAVATAN Z) 0.004 % solution ophthalmic solution Administer 1 drop into the left eye Every Night.   9/3/2019  at Unknown time   • midodrine (PROAMATINE) 5 MG tablet Take 5 mg by mouth Take As Directed. as needed for low blood pressure during dialysis.   More than a month at Unknown time       Medicines:  Current Facility-Administered Medications   Medication Dose Route Frequency Provider Last Rate Last Dose   • acetaminophen (TYLENOL) tablet 650 mg  650 mg Oral Q4H PRN Tejinder Kent MD   650 mg at 09/04/19 0339    Or   • acetaminophen (TYLENOL) 160 MG/5ML solution 650 mg  650 mg Oral Q4H PRN Tejinder Kent MD        Or   • acetaminophen (TYLENOL) suppository 650 mg  650 mg Rectal Q4H PRN Tejinder Kent MD       • aspirin EC tablet 81 mg  81 mg Oral Daily Tejinder Kent MD   81 mg at 09/04/19 1205   • atropine 1 % ophthalmic solution 1 drop  1 drop Left Eye BID Tejinder Kent MD   1 drop at 09/04/19 2102   • benzonatate (TESSALON) capsule 100 mg  100 mg Oral TID PRN Tejinder Kent MD       • brimonidine (ALPHAGAN) 0.2 % ophthalmic solution 1 drop  1 drop Left Eye Daily Tejinder Kent MD   1 drop at 09/04/19 1209   • calcitriol (ROCALTROL) capsule 0.25 mcg  0.25 mcg Oral Daily Tejinder Kent MD   0.25 mcg at 09/04/19 1211   • cefTRIAXone (ROCEPHIN) 1 g/100 mL 0.9% NS (MBP)  1 g Intravenous Q24H Tejinder Kent MD   Stopped at 09/04/19 2200   • cetirizine (zyrTEC) tablet 10 mg  10 mg Oral Daily Tejinder Kent MD   10 mg at 09/04/19 1212   • dextrose (D50W) 25 g/ 50mL Intravenous Solution 25 g  25 g Intravenous Q15 Min PRN Tejinder Kent MD       • dextrose (GLUTOSE) oral gel 15 g  15 g Oral Q15 Min PRN Tejinder Kent MD       • dorzolamide (TRUSOPT) 2 % ophthalmic solution 1 drop  1 drop Left Eye TID Tejinder Kent MD   1 drop at 09/04/19 2104   • enoxaparin (LOVENOX) syringe 30 mg  30 mg Subcutaneous Q24H Tejinder Kent MD   30 mg at 09/04/19 1213   • famotidine (PEPCID) tablet 20 mg  20 mg Oral Daily Tejinder Kent MD   20 mg at 09/04/19 1214   • fluticasone  (FLONASE) 50 MCG/ACT nasal spray 2 spray  2 spray Each Nare Daily Tejinder Kent MD   2 spray at 09/04/19 1214   • glucagon (human recombinant) (GLUCAGEN DIAGNOSTIC) injection 1 mg  1 mg Subcutaneous Q15 Min PRN Tejinder Kent MD       • guaiFENesin (MUCINEX) 12 hr tablet 1,200 mg  1,200 mg Oral Q12H Tejinder Kent MD   1,200 mg at 09/04/19 2100   • insulin detemir (LEVEMIR) injection 10 Units  10 Units Subcutaneous Nightly Tejinder Kent MD   10 Units at 09/04/19 2055   • insulin lispro (humaLOG) injection 2-7 Units  2-7 Units Subcutaneous 4x Daily With Meals & Nightly Tejinder Kent MD   4 Units at 09/04/19 2056   • ipratropium-albuterol (DUO-NEB) nebulizer solution 3 mL  3 mL Nebulization Q4H - RT Tejinder Kent MD   3 mL at 09/05/19 0659   • ipratropium-albuterol (DUO-NEB) nebulizer solution 3 mL  3 mL Nebulization Q4H PRN Tejinder Kent MD       • latanoprost (XALATAN) 0.005 % ophthalmic solution 1 drop  1 drop Left Eye Nightly Joseph Dempsey DO   1 drop at 09/04/19 2102   • lidocaine-prilocaine (EMLA) 2.5-2.5 % cream   Topical PRN Tejinder Kent MD       • losartan (COZAAR) tablet 100 mg  100 mg Oral Daily Tejinder Kent MD   100 mg at 09/04/19 1216   • methylPREDNISolone sodium succinate (SOLU-Medrol) injection 40 mg  40 mg Intravenous Q6H Tejinder Kent MD   40 mg at 09/05/19 0320   • midodrine (PROAMATINE) tablet 5 mg  5 mg Oral Daily PRN Tejinder Kent MD       • nicotine (NICODERM CQ) 21 MG/24HR patch 1 patch  1 patch Transdermal Q24H Tejinder Kent MD   1 patch at 09/05/19 0321   • ondansetron (ZOFRAN) tablet 4 mg  4 mg Oral Q6H PRN Tejinder Kent MD        Or   • ondansetron (ZOFRAN) injection 4 mg  4 mg Intravenous Q6H PRN Tejinder Kent MD       • pravastatin (PRAVACHOL) tablet 10 mg  10 mg Oral Nightly Tejinder Kent MD   10 mg at 09/04/19 2100   • sodium chloride 0.9 % flush 10 mL  10 mL Intravenous Q12H Tejinder Kent MD   10 mL  at 19   • sodium chloride 0.9 % flush 10 mL  10 mL Intravenous PRN Tejinder Kent MD       • timolol (TIMOPTIC) 0.5 % ophthalmic solution 1 drop  1 drop Left Eye TID Tejinder Kent MD   1 drop at 19       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    • Chronic otitis media    • Diabetes (CMS/HCC)    • Eustachian tube dysfunction    • Glaucoma    • HTN (hypertension)    • Kidney failure     on dialysis   • Liver disorder    • Mucoid otitis media        Past Surgical History:  Past Surgical History:   Procedure Laterality Date   • ARTERIOVENOUS FISTULA     • CATARACT EXTRACTION WITH INTRAOCULAR LENS IMPLANT     •  SECTION     • CHOLECYSTECTOMY     • MYRINGOTOMY W/ TUBES Bilateral    • MYRINGOTOMY W/ TUBES Right    • TUBAL ABDOMINAL LIGATION         Family History  Family History   Problem Relation Age of Onset   • Heart disease Mother        Social History  Social History     Socioeconomic History   • Marital status:      Spouse name: Not on file   • Number of children: Not on file   • Years of education: Not on file   • Highest education level: Not on file   Tobacco Use   • Smoking status: Former Smoker     Packs/day: 2.00     Types: Cigarettes     Last attempt to quit: 10/27/2017     Years since quittin.8   • Smokeless tobacco: Never Used   Substance and Sexual Activity   • Alcohol use: No   • Drug use: Yes     Frequency: 1.0 times per week     Types: Marijuana     Comment: pt says she smokes marijuana once per week   • Sexual activity: Defer         Review of Systems:  History obtained from chart review and the patient  General ROS: No fever or chills  Respiratory ROS: No cough, shortness of breath, wheezing  Cardiovascular ROS: No chest pain or palpitations  Gastrointestinal ROS: No abdominal pain or melena  Genito-Urinary ROS: No dysuria or hematuria  Neurological ROS: no headache or  dizziness    Objective:  Patient Vitals for the past 24 hrs:   BP Temp Temp src Pulse Resp SpO2 Weight   09/05/19 0705 -- -- -- 86 16 94 % --   09/05/19 0659 -- -- -- 86 16 94 % --   09/04/19 2007 -- -- -- 88 16 93 % --   09/04/19 2000 -- -- -- 89 16 (!) 89 % --   09/04/19 1938 143/63 98.2 °F (36.8 °C) Oral 90 16 90 % --   09/04/19 1627 151/68 98.9 °F (37.2 °C) Oral 96 16 94 % --   09/04/19 1453 -- -- -- 100 16 95 % --   09/04/19 1449 -- -- -- 95 16 95 % --   09/04/19 1156 155/74 99.1 °F (37.3 °C) Oral 111 18 95 % --   09/04/19 1125 165/70 98.8 °F (37.1 °C) Temporal 103 18 -- 61.7 kg (136 lb 0.4 oz)       Intake/Output Summary (Last 24 hours) at 9/5/2019 0844  Last data filed at 9/5/2019 0116  Gross per 24 hour   Intake 820 ml   Output 1500 ml   Net -680 ml     General: awake/alert    Neck: supple, no JVD  Chest:  wheezes bilateral bases  CVS: regular rate and rhythm  Abdominal: soft, nontender, positive bowel sounds  Extremities: no cyanosis or edema  Skin: warm and dry without rash  Neuro: no focal motor deficits    Labs:  Results from last 7 days   Lab Units 09/04/19  0532 09/03/19 2214   WBC 10*3/mm3 7.18 6.37   HEMOGLOBIN g/dL 12.5 13.3   HEMATOCRIT % 37.8 39.6   PLATELETS 10*3/mm3 197 187         Results from last 7 days   Lab Units 09/04/19  0532 09/03/19  2214   SODIUM mmol/L 140 143   POTASSIUM mmol/L 3.9 3.9   CHLORIDE mmol/L 100 98   CO2 mmol/L 21.0* 25.0   BUN mg/dL 49* 45*   CREATININE mg/dL 7.54* 7.55*   CALCIUM mg/dL 8.9 9.3   BILIRUBIN mg/dL 0.9 1.0   ALK PHOS U/L 70 74   ALT (SGPT) U/L 18 20   AST (SGOT) U/L 36 32   GLUCOSE mg/dL 226* 109*       Radiology:   Imaging Results (last 72 hours)     Procedure Component Value Units Date/Time    CT Abdomen Pelvis Without Contrast [[id number]] Collected:  09/03/19 2158     Updated:  09/03/19 2204    Narrative:       EXAMINATION: CT ABDOMEN PELVIS WO CONTRAST-      9/3/2019 9:51 PM CDT     HISTORY: Abdominal pain with nausea and vomiting.     In order to have  a CT radiation dose as low as reasonably achievable  Automated Exposure Control was utilized for adjustment of the mA and/or  KV according to patient size.     DLP in mGycm= 255.     Noncontrast abdomen/pelvis CT.     No renal or ureteral stone.  No hydronephrosis.     Linear band of infiltrate along left heart border is incompletely  visualized on this abdomen/pelvis CT the head the appearance of  pneumonia on chest x-ray.  Normal heart size.  The right lower lobe is clear.     Cholecystectomy clips are present.  Normal noncontrast appearance of the liver, pancreas, and spleen.  Normal and symmetric adrenal glands.     Aortic calcification with no aneurysm.     No bowel obstruction.  No appendicitis, diverticulitis, or colitis.     No pelvic mass or fluid.     Summary:  1. Findings suspicious for lingular infiltrate along the left heart  border. This was better visualized on the recent chest x-ray.  2. No mass, fluid collection, or inflammatory process is seen within the  abdomen pelvis.                                   This report was finalized on 09/03/2019 22:01 by Dr. Dharmesh Zuleta MD.    XR Chest 1 View [[id number]] Collected:  09/03/19 2144     Updated:  09/03/19 2150    Narrative:       EXAMINATION: XR CHEST 1 VW-     9/3/2019 9:37 PM CDT     HISTORY: Short of breath.     One view chest x-ray compared with 06/08/2006.     Patchy infiltrate along the left heart border probably involves the  lingula.     The upper lobes are clear.     There is no pneumothorax or heart failure.     Summary:  1. Left-sided pneumonia along the left heart border probably within a  lingular segment of the left upper lobe.        This report was finalized on 09/03/2019 21:47 by Dr. Dharmesh Zuleta MD.          Culture:  Blood Culture   Date Value Ref Range Status   09/03/2019 No growth at 24 hours  Preliminary   09/03/2019 No growth at 24 hours  Preliminary         Assessment   1.  End stage renal disease on HD TTS  2.  Type 2  diabetes with nephropathy  3.  Benign hypertension  4.  Pneumonia     Plan:  1.  Dialysis today  2.  Monitor labs      Gomez Lemos, ANTHONY  9/5/2019  8:44 AM

## 2019-09-06 ENCOUNTER — READMISSION MANAGEMENT (OUTPATIENT)
Dept: CALL CENTER | Facility: HOSPITAL | Age: 59
End: 2019-09-06

## 2019-09-06 NOTE — DISCHARGE PLACEMENT REQUEST
"[facility]  BENJY TREVINO RN CM [id number]      Mini Peña (59 y.o. Female)     Date of Birth Social Security Number Address Home Phone MRN    1960  [address] [place] [id number] [phone] [id number]    Yazdanism Marital Status          Other        Admission Date Admission Type Admitting Provider Attending Provider Department, Room/Bed    9/3/19 Emergency Joseph Dempsey, [facility] 4C, 495/1    Discharge Date Discharge Disposition Discharge Destination        9/5/2019 Home or Self Care Home             Attending Provider:  (none)   Allergies:  Bupropion, Chantix [Varenicline], Sulfa Antibiotics, Azithromycin, Levofloxacin, Penicillins    Isolation:  None   Infection:  None   Code Status:  Prior    Ht:  149.9 cm (59\")   Wt:  61.7 kg (136 lb 0.4 oz)    Admission Cmt:  None   Principal Problem:  Pneumonia of left upper lobe due to Streptococcus (CMS/[facility]) [J15.3]                 Active Insurance as of 9/3/2019     Primary Coverage     Payor Plan Insurance Group Employer/Plan Group    MEDICARE MEDICARE A & B      Payor Plan Address Payor Plan Phone Number Payor Plan Fax Number Effective Dates    [address] [phone]  7/1/2017 - None Entered    [facility] [id number]       Subscriber Name Subscriber Birth Date Member ID       MINI PEÑA 1960 [id number]           Secondary Coverage     Payor Plan Insurance Group Employer/Plan Group    [employer] [place]ICAID      Payor Plan Address Payor Plan Phone Number Payor Plan Fax Number Effective Dates    [address] [address] [phone]  11/4/2016 - None Entered    [facility] [id number]       Subscriber Name Subscriber Birth Date Member ID       MINI PEÑA 1960 [id number]                 Emergency Contacts      (Rel.) Home Phone Work Phone Mobile Phone    Erica Bowling (Daughter) -- -- [phone]               Discharge Summary      Guru Zacarias, APRN " at 9/5/2019  1:32 PM     Attestation signed by Joseph Dempsey DO at 9/5/2019  3:25 PM    I personally evaluated and examined the patient in conjunction with ANTHONY Higginbotham and agree with the assessment, treatment plan, and disposition of the patient as recorded by her. My history, exam, and further recommendations are:     Agree with discharge.    Joseph Dempsey DO  09/05/19  3:25 PM                        [facility] Medicine Services  DISCHARGE SUMMARY       Date of Admission: 9/3/2019  Date of Discharge:  9/5/2019  Primary Care Physician: Ivan Kelly MD    Presenting Problem/History of Present Illness:  Pneumonia due to infectious organism, unspecified laterality, unspecified part of lung [J18.9]     Final Discharge Diagnoses:  Active Hospital Problems    Diagnosis   • **Pneumonia of left upper lobe due to Streptococcus (CMS/[facility])   • Type 2 diabetes mellitus, with long-term current use of insulin (CMS/[facility])   • End stage renal failure on dialysis (CMS/[facility])   • Diabetic nephropathy (CMS/[facility])   • Glaucoma   • HTN (hypertension)     Consults:   1.  Nephrology, Dr. Boyd    Procedures Performed: None    Pertinent Test Results:   Lab Results (last 7 days)     Procedure Component Value Units Date/Time    Respiratory Culture - Sputum, Cough [[id number]] Collected:  09/04/19 0802    Specimen:  Sputum from Cough Updated:  09/05/19 1418     Respiratory Culture Scant growth (1+) Normal Respiratory Jacquie     Gram Stain Greater than 25 WBCs per low power field      Few (2+) Mixed gram positive jacquie      Few (2+) Epithelial cells seen    POC Glucose Once [[id number]]  (Abnormal) Collected:  09/05/19 1141    Specimen:  Blood Updated:  09/05/19 1159     Glucose 134 mg/dL      Comment: : [id number] Merr[place] PamelaMeter ID: [id number]       Blood Culture - Blood, Arm, Right [[id number]] Collected:  09/03/19 2113    Specimen:  Blood from Arm, Right Updated:  09/04/19 2230      Blood Culture No growth at 24 hours    Blood Culture - Blood, Arm, Right [[id number]] Collected:  09/03/19 2215    Specimen:  Blood from Arm, Right Updated:  09/04/19 2230     Blood Culture No growth at 24 hours    POC Glucose Once [[id number]]  (Abnormal) Collected:  09/04/19 2047    Specimen:  Blood Updated:  09/04/19 2117     Glucose 254 mg/dL      Comment: : [id number] Angel HeatherMeter ID: [id number]       POC Glucose Once [[id number]]  (Abnormal) Collected:  09/04/19 1631    Specimen:  Blood Updated:  09/04/19 1643     Glucose 267 mg/dL      Comment: : [id number] Explain [employer] [facility] WhitneyMeter ID: [id number]       POC Glucose Once [[id number]]  (Abnormal) Collected:  09/04/19 1158    Specimen:  Blood Updated:  09/04/19 1227     Glucose 135 mg/dL      Comment: : [id number] BeijingyichengMeter ID: [id number]       Hepatitis B Surface Antigen [[id number]]  (Normal) Collected:  09/04/19 0532    Specimen:  Blood Updated:  09/04/19 1007     Hepatitis B Surface Ag Negative    POC Glucose Once [[id number]]  (Abnormal) Collected:  09/04/19 0749    Specimen:  Blood Updated:  09/04/19 0800     Glucose 229 mg/dL      Comment: : [id number] BeijingyichengMeter ID: [id number]       Hemoglobin A1c [[id number]]  (Abnormal) Collected:  09/04/19 0532    Specimen:  Blood Updated:  09/04/19 0712     Hemoglobin A1C 6.3 %     Narrative:       Less than 6.0           Non-Diabetic Range  6.0-7.0                 ADA Therapeutic Target  Greater than 7.0        Action Suggested    T4, Free [[id number]]  (Normal) Collected:  09/04/19 0532    Specimen:  Blood Updated:  09/04/19 0647     Free T4 0.87 ng/dL     Comprehensive Metabolic Panel [[id number]]  (Abnormal) Collected:  09/04/19 0532    Specimen:  Blood Updated:  09/04/19 0642     Glucose 226 mg/dL      BUN 49 mg/dL      Creatinine 7.54 mg/dL      Sodium 140 mmol/L      Potassium 3.9 mmol/L      Chloride 100 mmol/L      CO2 21.0 mmol/L      Calcium 8.9 mg/dL      Total Protein 7.0  g/dL      Albumin 4.20 g/dL      ALT (SGPT) 18 U/L      AST (SGOT) 36 U/L      Alkaline Phosphatase 70 U/L      Total Bilirubin 0.9 mg/dL      eGFR Non African Amer 5 mL/min/1.73      Comment: <15 Indicative of kidney failure.        eGFR   Amer --     Comment: <15 Indicative of kidney failure.        Globulin 2.8 gm/dL      A/G Ratio 1.5 g/dL      BUN/Creatinine Ratio 6.5     Anion Gap 19.0 mmol/L     Narrative:       GFR Normal >60  Chronic Kidney Disease <60  Kidney Failure <15    Magnesium [[id number]]  (Normal) Collected:  09/04/19 0532    Specimen:  Blood Updated:  09/04/19 0638     Magnesium 2.2 mg/dL     Phosphorus [[id number]]  (Abnormal) Collected:  09/04/19 0532    Specimen:  Blood Updated:  09/04/19 0638     Phosphorus 6.0 mg/dL     CBC Auto Differential [[id number]]  (Abnormal) Collected:  09/04/19 0532    Specimen:  Blood Updated:  09/04/19 0621     WBC 7.18 10*3/mm3      RBC 3.94 10*6/mm3      Hemoglobin 12.5 g/dL      Hematocrit 37.8 %      MCV 95.9 fL      MCH 31.7 pg      MCHC 33.1 g/dL      RDW 14.5 %      RDW-SD 51.0 fl      MPV 9.4 fL      Platelets 197 10*3/mm3      Neutrophil % 93.1 %      Lymphocyte % 5.4 %      Monocyte % 1.1 %      Eosinophil % 0.0 %      Basophil % 0.1 %      Immature Grans % 0.3 %      Neutrophils, Absolute 6.68 10*3/mm3      Lymphocytes, Absolute 0.39 10*3/mm3      Monocytes, Absolute 0.08 10*3/mm3      Eosinophils, Absolute 0.00 10*3/mm3      Basophils, Absolute 0.01 10*3/mm3      Immature Grans, Absolute 0.02 10*3/mm3      nRBC 0.0 /100 WBC     Legionella Antigen, Urine - Urine, Urine, Clean Catch [[id number]]  (Normal) Collected:  09/04/19 0311    Specimen:  Urine, Clean Catch Updated:  09/04/19 0437     LEGIONELLA ANTIGEN, URINE Negative    S. Pneumo Ag Urine or CSF - Urine, Urine, Clean Catch [[id number]]  (Abnormal) Collected:  09/04/19 0311    Specimen:  Urine, Clean Catch Updated:  09/04/19 0437     Strep Pneumo Ag Positive    Narrative:       Streptococcus  pneumoniae (pneumococcal pneumonia) vaccine may cause false-positive results, especially in patients who have received the vaccine within 5 days of having the test performed.    Urinalysis, Microscopic Only - Urine, Clean Catch [[id number]]  (Abnormal) Collected:  09/04/19 0311    Specimen:  Urine, Clean Catch Updated:  09/04/19 0408     RBC, UA 0-2 /HPF      WBC, UA 0-2 /HPF      Bacteria, UA Trace /HPF      Squamous Epithelial Cells, UA 0-2 /HPF      Hyaline Casts, UA None Seen /LPF      Methodology Manual Light Microscopy    Urinalysis With Culture If Indicated - Urine, Clean Catch [[id number]]  (Abnormal) Collected:  09/04/19 0311    Specimen:  Urine, Clean Catch Updated:  09/04/19 0358     Color, UA Yellow     Appearance, UA Clear     pH, UA 6.5     Specific Gravity, UA 1.020     Glucose,  mg/dL (Trace)     Ketones, UA 15 mg/dL (1+)     Bilirubin, UA Negative     Blood, UA Small (1+)     Protein, UA >=300 mg/dL (3+)     Leuk Esterase, UA Negative     Nitrite, UA Negative     Urobilinogen, UA 0.2 E.U./dL    POC Glucose Once [[id number]]  (Abnormal) Collected:  09/04/19 0252    Specimen:  Blood Updated:  09/04/19 0303     Glucose 170 mg/dL      Comment: : [id number] Chica ChelseaMeter ID: [id number]       Phosphorus [[id number]]  (Abnormal) Collected:  09/03/19 2214    Specimen:  Blood Updated:  09/04/19 0131     Phosphorus 5.9 mg/dL     aPTT [[id number]]  (Normal) Collected:  09/03/19 2214    Specimen:  Blood Updated:  09/03/19 2308     PTT 31.5 seconds     Protime-INR [[id number]]  (Normal) Collected:  09/03/19 2214    Specimen:  Blood Updated:  09/03/19 2308     Protime 13.0 Seconds      INR 0.95    TSH [[id number]]  (Normal) Collected:  09/03/19 2214    Specimen:  Blood Updated:  09/03/19 2306     TSH 1.410 uIU/mL     Troponin [[id number]]  (Normal) Collected:  09/03/19 2214    Specimen:  Blood Updated:  09/03/19 2249     Troponin I 0.033 ng/mL     BNP [[id number]]  (Abnormal) Collected:  09/03/19 1531     Specimen:  Blood Updated:  09/03/19 2249     proBNP 3,360.0 pg/mL     Comprehensive Metabolic Panel [[id number]]  (Abnormal) Collected:  09/03/19 2214    Specimen:  Blood Updated:  09/03/19 2237     Glucose 109 mg/dL      BUN 45 mg/dL      Creatinine 7.55 mg/dL      Sodium 143 mmol/L      Potassium 3.9 mmol/L      Chloride 98 mmol/L      CO2 25.0 mmol/L      Calcium 9.3 mg/dL      Total Protein 7.8 g/dL      Albumin 4.60 g/dL      ALT (SGPT) 20 U/L      AST (SGOT) 32 U/L      Alkaline Phosphatase 74 U/L      Total Bilirubin 1.0 mg/dL      eGFR Non African Amer 5 mL/min/1.73      Comment: <15 Indicative of kidney failure.        eGFR   Amer --     Comment: <15 Indicative of kidney failure.        Globulin 3.2 gm/dL      A/G Ratio 1.4 g/dL      BUN/Creatinine Ratio 6.0     Anion Gap 20.0 mmol/L     Narrative:       GFR Normal >60  Chronic Kidney Disease <60  Kidney Failure <15    Lipase [[id number]]  (Normal) Collected:  09/03/19 2214    Specimen:  Blood Updated:  09/03/19 2237     Lipase 194 U/L     Magnesium [[id number]]  (Abnormal) Collected:  09/03/19 2214    Specimen:  Blood Updated:  09/03/19 2237     Magnesium 2.3 mg/dL     Lactic Acid, Plasma [[id number]]  (Normal) Collected:  09/03/19 2214    Specimen:  Blood Updated:  09/03/19 2235     Lactate 1.3 mmol/L     CBC & Differential [[id number]] Collected:  09/03/19 2214    Specimen:  Blood Updated:  09/03/19 2225    Narrative:       The following orders were created for panel order CBC & Differential.  Procedure                               Abnormality         Status                     ---------                               -----------         ------                     CBC Auto Differential[[id number]]        Abnormal            Final result                 Please view results for these tests on the individual orders.    CBC Auto Differential [[id number]]  (Abnormal) Collected:  09/03/19 2214    Specimen:  Blood Updated:  09/03/19 2225     WBC 6.37  10*3/mm3      RBC 4.20 10*6/mm3      Hemoglobin 13.3 g/dL      Hematocrit 39.6 %      MCV 94.3 fL      MCH 31.7 pg      MCHC 33.6 g/dL      RDW 14.6 %      RDW-SD 50.7 fl      MPV 8.8 fL      Platelets 187 10*3/mm3      Neutrophil % 74.4 %      Lymphocyte % 20.1 %      Monocyte % 4.7 %      Eosinophil % 0.2 %      Basophil % 0.3 %      Immature Grans % 0.3 %      Neutrophils, Absolute 4.74 10*3/mm3      Lymphocytes, Absolute 1.28 10*3/mm3      Monocytes, Absolute 0.30 10*3/mm3      Eosinophils, Absolute 0.01 10*3/mm3      Basophils, Absolute 0.02 10*3/mm3      Immature Grans, Absolute 0.02 10*3/mm3      nRBC 0.0 /100 WBC         Imaging Results (last 7 days)     Procedure Component Value Units Date/Time    CT Abdomen Pelvis Without Contrast [[id number]] Collected:  09/03/19 2158     Updated:  09/03/19 2204    Narrative:       EXAMINATION: CT ABDOMEN PELVIS WO CONTRAST-      9/3/2019 9:51 PM CDT     HISTORY: Abdominal pain with nausea and vomiting.     In order to have a CT radiation dose as low as reasonably achievable  Automated Exposure Control was utilized for adjustment of the mA and/or  KV according to patient size.     DLP in mGycm= 255.     Noncontrast abdomen/pelvis CT.     No renal or ureteral stone.  No hydronephrosis.     Linear band of infiltrate along left heart border is incompletely  visualized on this abdomen/pelvis CT the head the appearance of  pneumonia on chest x-ray.  Normal heart size.  The right lower lobe is clear.     Cholecystectomy clips are present.  Normal noncontrast appearance of the liver, pancreas, and spleen.  Normal and symmetric adrenal glands.     Aortic calcification with no aneurysm.     No bowel obstruction.  No appendicitis, diverticulitis, or colitis.     No pelvic mass or fluid.     Summary:  1. Findings suspicious for lingular infiltrate along the left heart  border. This was better visualized on the recent chest x-ray.  2. No mass, fluid collection, or inflammatory process  is seen within the  abdomen pelvis.                                   This report was finalized on 09/03/2019 22:01 by Dr. Dharmesh Zuleta MD.    XR Chest 1 View [[id number]] Collected:  09/03/19 2144     Updated:  09/03/19 2150    Narrative:       EXAMINATION: XR CHEST 1 VW-     9/3/2019 9:37 PM CDT     HISTORY: Short of breath.     One view chest x-ray compared with 06/08/2006.     Patchy infiltrate along the left heart border probably involves the  lingula.     The upper lobes are clear.     There is no pneumothorax or heart failure.     Summary:  1. Left-sided pneumonia along the left heart border probably within a  lingular segment of the left upper lobe.        This report was finalized on 09/03/2019 21:47 by Dr. Dharmesh Zuleta MD.        History of Present Illness on Day of Discharge:   Patient is currently sitting up in bed talking on her cell phone.  She states she feels much better.  She is eager to be discharged home as she feels she will rest better at home.  She has a boyfriend that helps care for her.  She denies any complaints at this time.  She has been afebrile for greater than 24 hours.  She will resume dialysis on Saturday.    Hospital Course:  The patient is a 59 y.o. female who presented to [facility] with cough, nausea and vomiting.  Patient has a past medical history significant for end-stage renal disease on hemodialysis, type 2 diabetes, hypertension and former tobacco abuse.  The patient presented with a one week history of productive cough with green sputum, nausea and shortness of breath.  She also complained of no bowel movement in one week.  She was evaluated in the emergency department and found to have a linear infiltrate on her chest xray involving the lingula of the left lung.  She also continues to smoke 2 packs per day as well.  She had essentially a negative CT scan of her abdomen and pelvis except for the abnormality consistent with pneumonia from her CXR.  She was  "admitted for pneumonia and further treatment.  She was started on Doxycycline and Rocephin.  Urinary antigen's were sent off for strep pneumonia and legionella.  She was also felt to have some degree of COPD exacerbation along with this and was started on some steroids.  She was seen by Dr. Lees for continuation of her hemodialysis since she missed Monday's treatment.     She was found to be positive for strep pneumonia and the Doxycycline was discontinued and she was treated with Rocephin monotherapy.  She was also educated multiple times throughout her hospitalization for smoking cessation.      She was been weaned down on her steroids and currently not requiring any oxygen therapy.  She was given a HOG enema and had good results with that.  She has had dialysis yesterday and today.  Tolerated both treatments well.  She has been afebrile and doing well today.  She states that she feels much better and is eager to be discharged home.  She states that she will feel so much better at home.      She will be discharged home today in stable condition.  She will follow up with ANTHONY Kiser in one week.  She will be prescribed Omnicef to complete her antibiotic therapy.     Condition on Discharge: Stable    Physical Exam on Discharge:  /71 (BP Location: Right arm, Patient Position: Sitting)   Pulse 98   Temp 98.5 °F (36.9 °C) (Oral)   Resp 16   Ht 149.9 cm (59\")   Wt 61.7 kg (136 lb 0.4 oz)   SpO2 92%   BMI 27.47 kg/m²    Physical Exam   Constitutional: She is oriented to person, place, and time. She appears well-developed and well-nourished.   Sitting up in bed.  No acute distress.   HENT:   Head: Normocephalic and atraumatic.   Eyes: Conjunctivae and EOM are normal. Pupils are equal, round, and reactive to light.   Neck: Neck supple. No JVD present. No thyromegaly present.   Cardiovascular: Normal rate, regular rhythm, normal heart sounds and intact distal pulses. Exam reveals no gallop and no " friction rub.   No murmur heard.  Pulmonary/Chest: Effort normal and breath sounds normal. No respiratory distress. She has no wheezes. She has no rales. She exhibits no tenderness.   Room air   Abdominal: Soft. Bowel sounds are normal. She exhibits no distension. There is no tenderness. There is no rebound and no guarding.   Genitourinary:   Genitourinary Comments: Voiding.   Musculoskeletal: Normal range of motion. She exhibits no edema, tenderness or deformity.   Lymphadenopathy:     She has no cervical adenopathy.   Neurological: She is alert and oriented to person, place, and time.   Skin: Skin is warm and dry. No rash noted.   Psychiatric: She has a normal mood and affect. Her behavior is normal. Judgment and thought content normal.   Nursing note and vitals reviewed.    Discharge Disposition:  Home or Self Care    Discharge Medications:     Discharge Medications      New Medications      Instructions Start Date   cefdinir 300 MG capsule  Commonly known as:  OMNICEF   300 mg, Oral, 2 Times Daily      guaiFENesin 600 MG 12 hr tablet  Commonly known as:  MUCINEX   1,200 mg, Oral, Every 12 Hours Scheduled      predniSONE 20 MG tablet  Commonly known as:  DELTASONE   20 mg, Oral, Daily, Take 40 mg daily for 3 days, 20 mg daily for 3 days, 10 mg daily for 3 days then stop         Continue These Medications      Instructions Start Date   aspirin 81 MG EC tablet   81 mg, Oral, Daily      benzonatate 100 MG capsule  Commonly known as:  TESSALON   100 mg, Oral, 3 Times Daily PRN      brimonidine 0.2 % ophthalmic solution  Commonly known as:  ALPHAGAN   1 drop, Left Eye, 3 Times Daily      calcitriol 0.25 MCG capsule  Commonly known as:  ROCALTROL   0.25 mcg, Oral, Take As Directed, Only given on dialysis days (Tuesday, Thursday, Saturday)      cetirizine 10 MG tablet  Commonly known as:  zyrTEC   10 mg, Oral, Nightly PRN      cholecalciferol 1000 units tablet  Commonly known as:  VITAMIN D3   1,000 Units, Oral, Daily       dorzolamide 2 % ophthalmic solution  Commonly known as:  TRUSOPT   1 drop, Left Eye, 2 Times Daily      famotidine 20 MG tablet  Commonly known as:  PEPCID   20 mg, Oral, 2 Times Daily      fluticasone 50 MCG/ACT nasal spray  Commonly known as:  FLONASE   1 spray, Nasal, 2 Times Daily      homatropine 5 % ophthalmic solution   1 drop, Left Eye, Daily      insulin glargine 100 UNIT/ML injection  Commonly known as:  LANTUS   10 Units, Subcutaneous, Nightly      lidocaine-prilocaine 2.5-2.5 % cream  Commonly known as:  EMLA   1 application, Topical, Take As Directed, Apply to access area 30 minutes prior to dialysis.      losartan 25 MG tablet  Commonly known as:  COZAAR   25 mg, Oral, Daily      midodrine 5 MG tablet  Commonly known as:  PROAMATINE   5 mg, Oral, Take As Directed, as needed for low blood pressure during dialysis.       pravastatin 10 MG tablet  Commonly known as:  PRAVACHOL   10 mg, Oral, Nightly      RHOPRESSA 0.02 % solution  Generic drug:  Netarsudil Dimesylate   1 drop, Left Eye, Nightly      sertraline 25 MG tablet  Commonly known as:  ZOLOFT   25 mg, Oral, Nightly      sevelamer 800 MG tablet  Commonly known as:  RENAGEL   1,600 mg, Oral, 3 Times Daily With Meals      timolol 0.5 % ophthalmic solution  Commonly known as:  TIMOPTIC   1 drop, Left Eye, Every Morning      TRAVATAN Z 0.004 % solution ophthalmic solution  Generic drug:  travoprost (BAK free)   1 drop, Left Eye, Nightly           Discharge Diet:   Diet Instructions     Diet: Regular, Consistent Carbohydrate, Cardiac, Renal      Discharge Diet:   Regular  Consistent Carbohydrate  Cardiac  Renal           Activity at Discharge:   Activity Instructions     Activity as Tolerated          Discharge Care Plan/Instructions:   1.  Resume normal dialysis schedule  2.  Follow-up with Dr. Kelly/ANTHONY Kiser in 1 week    Follow-up Appointments:   No future appointments.    Test Results Pending at Discharge: None    Guru Zacarias  ANTHONY  09/05/19  1:32 PM    Time: 35 minutes          Electronically signed by Joseph Dempsey DO at 9/5/2019  3:25 PM

## 2019-09-06 NOTE — OUTREACH NOTE
Prep Survey      Responses   Facility patient discharged from?  [facility]   Is patient eligible?  Yes   Discharge diagnosis  Pneumonia of LINDA d/t strep, IDDM II, ESRD on dialysis, diabetic nephropathy, glaucoma, HTN   Does the patient have one of the following disease processes/diagnoses(primary or secondary)?  COPD/Pneumonia   Does the patient have Home health ordered?  No   Is there a DME ordered?  No   Comments regarding appointments  Pt to schedule follow up with PCP   General alerts for this patient  Dialysis T/Th/Sat at [facility]   Prep survey completed?  Yes          Soledad Schwartz RN

## 2019-09-07 ENCOUNTER — READMISSION MANAGEMENT (OUTPATIENT)
Dept: CALL CENTER | Facility: HOSPITAL | Age: 59
End: 2019-09-07

## 2019-09-07 NOTE — OUTREACH NOTE
COPD/PN Week 1 Survey      Responses   Facility patient discharged from?  [facility]   Does the patient have one of the following disease processes/diagnoses(primary or secondary)?  COPD/Pneumonia   Is there a successful TCM telephone encounter documented?  No   Was the primary reason for admission:  Pneumonia   Week 1 attempt successful?  Yes   Call start time  1504   Call end time  1507   Discharge diagnosis  Pneumonia of LINDA d/t strep, IDDM II, ESRD on dialysis, diabetic nephropathy, glaucoma, HTN   Meds reviewed with patient/caregiver?  Yes   Is the patient having any side effects they believe may be caused by any medication additions or changes?  No   Does the patient have all medications ordered at discharge?  Yes   Is the patient taking all medications as directed (includes completed medication regime)?  Yes   Does the patient have a primary care provider?   Yes   Does the patient have an appointment with their PCP or pulmonologist within 7 days of discharge?  Yes   Has the patient kept scheduled appointments due by today?  N/A   Has home health visited the patient within 72 hours of discharge?  N/A   Psychosocial issues?  No   Did the patient receive a copy of their discharge instructions?  Yes   Nursing interventions  Reviewed instructions with patient, Educated on MyChart   What is the patient's perception of their health status since discharge?  Improving   Is the patient/caregiver able to teach back the hierarchy of who to call/visit for symptoms/problems? PCP, Specialist, Home health nurse, Urgent Care, ED, 911  Yes   Is the patient/caregiver able to teach back signs and symptoms of worsening condition:  Fever/chills, Shortness of breath, Chest pain   Is the patient/caregiver able to teach back importance of completing antibiotic course of treatment?  Yes   Week 1 call completed?  Yes          Xena Sol, RN

## 2019-09-08 LAB
BACTERIA SPEC AEROBE CULT: NORMAL
BACTERIA SPEC AEROBE CULT: NORMAL
BACTERIA SPEC RESP CULT: ABNORMAL
GRAM STN SPEC: ABNORMAL

## 2019-09-09 ENCOUNTER — TELEPHONE (OUTPATIENT)
Dept: INTERNAL MEDICINE | Age: 59
End: 2019-09-09

## 2019-09-09 NOTE — TELEPHONE ENCOUNTER
[facility] Trans[facility] Initial Follow Up Call    Outreach made within 2 business days of discharge: Yes    Patient: Andreea Jerez Patient : 1960   MRN: [id number]  Reason for Admission: Admitted 19 for pnuemonia  Discharge Date: 19  Discharge Diagnosis:     Pneumonia of left upper lobe due to Streptococcus (CMS/[facility])    Type 2 diabetes mellitus, with long-term current use of insulin (CMS/[facility])    End stage renal failure on dialysis (CMS/[facility])    Diabetic nephropathy (CMS/[facility])    Glaucoma    HTN (hypertension)        Spoke with: patient    Discharge department/facility: [facility] Patient Contact:  Was patient able to fill all prescriptions: Yes  Was patient instructed to bring all medications to the follow-up visit: Yes  Is patient taking all medications as directed in the discharge summary? Yes  Does patient understand their discharge instructions: Yes  Does patient have questions or concerns that need addressed prior to 7-14 day follow up office visit: no    I spoke with Mrs. Venkatesh Nevarez. She is home and doing some better. She states she still feels very shaky. She is having a hard time resting at night because she coughs the most when she lies down. Her appetite is good. Bowels and bladder are moving as usual. She is taking all of her medication as directed and denies any needs at this time. She will follow up in our office tomorrow with [facility].      Scheduled appointment with PCP within 7-14 days    Follow Up  Future Appointments   Date Time Provider Darrell Price   9/10/2019  1:30 PM RAMONA Argueta [address]

## 2019-09-10 ENCOUNTER — HOSPITAL ENCOUNTER (OUTPATIENT)
Dept: GENERAL RADIOLOGY | Age: 59
Discharge: HOME | End: 2019-09-10
Payer: MEDICARE

## 2019-09-10 ENCOUNTER — OFFICE VISIT (OUTPATIENT)
Dept: PRIMARY CARE CLINIC | Age: 59
End: 2019-09-10
Payer: MEDICARE

## 2019-09-10 VITALS
OXYGEN SATURATION: 98 % | TEMPERATURE: 98 F | BODY MASS INDEX: 26.41 KG/M2 | DIASTOLIC BLOOD PRESSURE: 80 MMHG | WEIGHT: 131 LBS | SYSTOLIC BLOOD PRESSURE: 160 MMHG | HEART RATE: 93 BPM | HEIGHT: 59 IN

## 2019-09-10 DIAGNOSIS — R09.89 CHEST CONGESTION: ICD-10-CM

## 2019-09-10 DIAGNOSIS — Z71.6 TOBACCO ABUSE COUNSELING: ICD-10-CM

## 2019-09-10 DIAGNOSIS — R06.2 WHEEZING: ICD-10-CM

## 2019-09-10 DIAGNOSIS — R05.9 COUGH: ICD-10-CM

## 2019-09-10 DIAGNOSIS — Z72.0 TOBACCO ABUSE: ICD-10-CM

## 2019-09-10 DIAGNOSIS — Z09 HOSPITAL DISCHARGE FOLLOW-UP: Primary | ICD-10-CM

## 2019-09-10 PROCEDURE — 99406 BEHAV CHNG SMOKING 3-10 MIN: CPT | Performed by: NURSE PRACTITIONER

## 2019-09-10 PROCEDURE — 1111F DSCHRG MED/CURRENT MED MERGE: CPT | Performed by: NURSE PRACTITIONER

## 2019-09-10 PROCEDURE — 99496 TRANSJ CARE MGMT HIGH F2F 7D: CPT | Performed by: NURSE PRACTITIONER

## 2019-09-10 PROCEDURE — 71046 X-RAY EXAM CHEST 2 VIEWS: CPT

## 2019-09-10 RX ORDER — CEFDINIR 300 MG/1
300 CAPSULE ORAL 2 TIMES DAILY
Qty: 10 CAPSULE | Refills: 0 | Status: SHIPPED | OUTPATIENT
Start: 2019-09-10 | End: 2019-09-15

## 2019-09-10 RX ORDER — IPRATROPIUM BROMIDE AND ALBUTEROL SULFATE 2.5; .5 MG/3ML; MG/3ML
1 SOLUTION RESPIRATORY (INHALATION) EVERY 4 HOURS
Qty: 360 ML | Refills: 1 | Status: SHIPPED | OUTPATIENT
Start: 2019-09-10 | End: 2020-05-20 | Stop reason: SDUPTHER

## 2019-09-10 ASSESSMENT — ENCOUNTER SYMPTOMS
WHEEZING: 1
EYES NEGATIVE: 1
COUGH: 1
GASTROINTESTINAL NEGATIVE: 1

## 2019-09-10 NOTE — PROGRESS NOTES
Post-Discharge Transitional Care Management Services or Hospital Follow Up      Keith Franco   YOB: 1960    Date of Office Visit:  9/10/2019  Date of Hospital Admission: 6/23/18  Date of Hospital Discharge: 6/23/18  Readmission Risk Score(high >=14%. Medium >=10%):No data recorded    Care management risk score Rising risk (score 2-5) and Complex Care (Scores >=6): 6     Non face to face  following discharge, date last encounter closed (first attempt may have been earlier): 9/9/2019 11:57 AM 9/9/2019 11:57 AM    Call initiated 2 business days of discharge: Yes     Patient Active Problem List   Diagnosis    Diabetes mellitus with ophthalmic complication ([address]    Arthropathy    Proteinuria    Disorder of phosphorus metabolism    HTN (hypertension)    Glaucoma    [facility]    Breast density    Dialysis AV fistula malfunction ([facility]    ESRD (end stage renal disease) on dialysis ([facility] [facility]    CKD (chronic kidney disease) stage V requiring chronic dialysis ([facility])       Allergies   Allergen Reactions    Bupropion Nausea Only    Sulfa Antibiotics      Causes hypoglycemia     Varenicline     Wellbutrin [Bupropion Hcl] Nausea Only    Azithromycin Rash    Penicillins Rash       Medications listed as ordered at the time of discharge from hospital   [place], [address] Medication Instructions [facility]:    Printed on:09/10/19 2111   Medication Information                      aspirin EC 81 MG EC tablet  Take 81 mg by mouth daily             B-D ULTRAFINE III SHORT PEN 31G X 8 MM MISC  USE ONCE DAILY TO INJECT INSULIN DAILY             bimatoprost 0.01 % SOLN  Apply 1 drop to eye nightly. One drop in left eye at              blood glucose monitor kit and supplies  1 kit by Other route 3 times daily Test three times a day & as needed for symptoms of irregular blood glucose. Blood Glucose Monitoring Suppl NIKOS  by Does not apply route.  Pt will also need new lancet Uvula is midline, oropharynx is clear and moist and mucous membranes are normal.   Eyes: Pupils are equal, round, and reactive to light. Conjunctivae, EOM and lids are normal.   Neck: Trachea normal and normal range of motion. Neck supple. No thyroid mass and no thyromegaly present. Cardiovascular: Normal rate, regular rhythm, normal heart sounds and intact distal pulses. Pulmonary/Chest: Effort normal. She has decreased breath sounds. She has wheezes. Chest congestion   Abdominal: Soft. Normal appearance and bowel sounds are normal.   Musculoskeletal: Normal range of motion. Cervical back: Normal. She exhibits normal range of motion and no tenderness. Thoracic back: Normal. She exhibits normal range of motion and no tenderness. Lumbar back: Normal. She exhibits normal range of motion and no tenderness. Neurological: She is alert and oriented to person, place, and time. She has normal strength. Skin: Skin is warm, dry and intact. Psychiatric: She has a normal mood and affect. Her speech is normal and behavior is normal. Judgment and thought content normal. Cognition and memory are normal.   Nursing note and vitals reviewed. Assessment/Plan:  1. Hospital discharge follow-up    - TN DISCHARGE MEDS RECONCILED W/ CURRENT OUTPATIENT MED LIST    2. Cough    - XR CHEST STANDARD (2 VW); Future    3. Chest congestion    - XR CHEST STANDARD (2 VW); Future    4. Wheezing    - XR CHEST STANDARD (2 VW); Future        Medical Decision Making: high complexity    I am checking CXR to monitor pneumonia. She is to continue abx. Neb machine provided. Approximately 5 minutes of education was provided about quit smoking and the harms of tobacco.  Patient does show understanding. Patient does not have  the desire to quit smoking in the future.

## 2019-09-13 ENCOUNTER — TELEPHONE (OUTPATIENT)
Dept: PRIMARY CARE CLINIC | Age: 59
End: 2019-09-13

## 2019-09-13 NOTE — TELEPHONE ENCOUNTER
----- Message from RAMONA Juarez sent at 9/12/2019  4:23 PM CDT -----  Please let patient know that CXR has returned and was negative for any acute pulmonary process. Thanks!

## 2019-09-16 ENCOUNTER — READMISSION MANAGEMENT (OUTPATIENT)
Dept: CALL CENTER | Facility: HOSPITAL | Age: 59
End: 2019-09-16

## 2019-09-16 NOTE — OUTREACH NOTE
COPD/PN Week 2 Survey      Responses   Facility patient discharged from?  [place]   Does the patient have one of the following disease processes/diagnoses(primary or secondary)?  COPD/Pneumonia   Was the primary reason for admission:  Pneumonia   Week 2 attempt successful?  Yes   Call start time  1152   Call end time  1207   General alerts for this patient  Dialysis T/Th/Sat at [place]   Discharge diagnosis  Pneumonia of LINDA d/t strep, IDDM II, ESRD on dialysis, diabetic nephropathy, glaucoma, HTN   Is patient permission given to speak with other caregiver?  No   Meds reviewed with patient/caregiver?  Yes   Is the patient having any side effects they believe may be caused by any medication additions or changes?  Yes   Side effects comments   Heart racing from newly ordered albuterol nebulizer.    Does the patient have all medications ordered at discharge?  Yes   Is the patient taking all medications as directed (includes completed medication regime)?  Yes   Does the patient have a primary care provider?   Yes   Has the patient kept scheduled appointments due by today?  Yes   Has home health visited the patient within 72 hours of discharge?  N/A   Psychosocial issues?  No   Did the patient receive a copy of their discharge instructions?  Yes   Nursing interventions  Reviewed instructions with patient   What is the patient's perception of their health status since discharge?  Improving   Is the patient/caregiver able to teach back the hierarchy of who to call/visit for symptoms/problems? PCP, Specialist, Home health nurse, Urgent Care, ED, 911  Yes   Is the patient/caregiver able to teach back signs and symptoms of worsening condition:  Fever/chills, Shortness of breath, Chest pain   Is the patient/caregiver able to teach back importance of completing antibiotic course of treatment?  Yes   Week 2 call completed?  Yes          Soledad Sandhu RN

## 2019-09-25 ENCOUNTER — OFFICE VISIT (OUTPATIENT)
Dept: PRIMARY CARE CLINIC | Age: 59
End: 2019-09-25
Payer: MEDICARE

## 2019-09-25 ENCOUNTER — READMISSION MANAGEMENT (OUTPATIENT)
Dept: CALL CENTER | Facility: HOSPITAL | Age: 59
End: 2019-09-25

## 2019-09-25 VITALS
WEIGHT: 137 LBS | HEIGHT: 59 IN | DIASTOLIC BLOOD PRESSURE: 88 MMHG | TEMPERATURE: 97.6 F | OXYGEN SATURATION: 98 % | RESPIRATION RATE: 17 BRPM | SYSTOLIC BLOOD PRESSURE: 138 MMHG | HEART RATE: 89 BPM | BODY MASS INDEX: 27.62 KG/M2

## 2019-09-25 DIAGNOSIS — E78.2 MIXED HYPERLIPIDEMIA: ICD-10-CM

## 2019-09-25 DIAGNOSIS — Z79.4 TYPE 2 DIABETES MELLITUS WITH OTHER OPHTHALMIC COMPLICATION, WITH LONG-TERM CURRENT USE OF INSULIN (HCC): Primary | ICD-10-CM

## 2019-09-25 DIAGNOSIS — E11.39 TYPE 2 DIABETES MELLITUS WITH OTHER OPHTHALMIC COMPLICATION, WITH LONG-TERM CURRENT USE OF INSULIN (HCC): Primary | ICD-10-CM

## 2019-09-25 DIAGNOSIS — Z00.00 HEALTHCARE MAINTENANCE: ICD-10-CM

## 2019-09-25 DIAGNOSIS — I10 ESSENTIAL HYPERTENSION: ICD-10-CM

## 2019-09-25 LAB — HBA1C MFR BLD: 6.5 %

## 2019-09-25 PROCEDURE — 1036F TOBACCO NON-USER: CPT | Performed by: NURSE PRACTITIONER

## 2019-09-25 PROCEDURE — 3017F COLORECTAL CA SCREEN DOC REV: CPT | Performed by: NURSE PRACTITIONER

## 2019-09-25 PROCEDURE — 2022F DILAT RTA XM EVC RTNOPTHY: CPT | Performed by: NURSE PRACTITIONER

## 2019-09-25 PROCEDURE — 83036 HEMOGLOBIN GLYCOSYLATED A1C: CPT | Performed by: NURSE PRACTITIONER

## 2019-09-25 PROCEDURE — 99214 OFFICE O/P EST MOD 30 MIN: CPT | Performed by: NURSE PRACTITIONER

## 2019-09-25 PROCEDURE — G8417 CALC BMI ABV UP PARAM F/U: HCPCS | Performed by: NURSE PRACTITIONER

## 2019-09-25 PROCEDURE — G8427 DOCREV CUR MEDS BY ELIG CLIN: HCPCS | Performed by: NURSE PRACTITIONER

## 2019-09-25 PROCEDURE — 3044F HG A1C LEVEL LT 7.0%: CPT | Performed by: NURSE PRACTITIONER

## 2019-09-25 RX ORDER — LOSARTAN POTASSIUM 50 MG/1
50 TABLET ORAL DAILY
Qty: 30 TABLET | Refills: 5 | Status: SHIPPED | OUTPATIENT
Start: 2019-09-25 | End: 2020-03-06 | Stop reason: SDUPTHER

## 2019-09-25 RX ORDER — LACTULOSE 10 G/15ML
20 SOLUTION ORAL 3 TIMES DAILY
Qty: 1892 ML | Refills: 1 | Status: SHIPPED | OUTPATIENT
Start: 2019-09-25 | End: 2019-09-25 | Stop reason: SDUPTHER

## 2019-09-25 RX ORDER — LACTULOSE 10 G/15ML
SOLUTION ORAL
Qty: 8108 ML | Refills: 1 | Status: SHIPPED | OUTPATIENT
Start: 2019-09-25

## 2019-09-25 ASSESSMENT — ENCOUNTER SYMPTOMS
GASTROINTESTINAL NEGATIVE: 1
RESPIRATORY NEGATIVE: 1
EYES NEGATIVE: 1

## 2019-09-25 NOTE — OUTREACH NOTE
COPD/PN Week 3 Survey      Responses   Facility patient discharged from?  [place]   Does the patient have one of the following disease processes/diagnoses(primary or secondary)?  COPD/Pneumonia   Was the primary reason for admission:  Pneumonia   Week 3 attempt successful?  Yes   Call start time  1229   Call end time  1232   Discharge diagnosis  Pneumonia of LINDA d/t strep, IDDM II, ESRD on dialysis, diabetic nephropathy, glaucoma, HTN   Meds reviewed with patient/caregiver?  Yes   Is the patient having any side effects they believe may be caused by any medication additions or changes?  No   Does the patient have all medications ordered at discharge?  Yes   Is the patient taking all medications as directed (includes completed medication regime)?  Yes   Does the patient have a primary care provider?   Yes   Does the patient have an appointment with their PCP or pulmonologist within 7 days of discharge?  Yes   Has the patient kept scheduled appointments due by today?  Yes   Has home health visited the patient within 72 hours of discharge?  N/A   Psychosocial issues?  No   Did the patient receive a copy of their discharge instructions?  Yes   Nursing interventions  Reviewed instructions with patient   Nursing Interventions  Nurse provided patient education   Is the patient/caregiver able to teach back the hierarchy of who to call/visit for symptoms/problems? PCP, Specialist, Home health nurse, Urgent Care, ED, 911  Yes   Is the patient/caregiver able to teach back signs and symptoms of worsening condition:  Fever/chills, Shortness of breath, Chest pain   Is the patient/caregiver able to teach back importance of completing antibiotic course of treatment?  Yes   Week 3 call completed?  Yes          Heike Mansfield RN

## 2019-09-25 NOTE — PROGRESS NOTES
manifestations, uncontrolled(250.42)         Past Surgical History:   Procedure Laterality Date    CARPAL TUNNEL RELEASE      CATARACT REMOVAL       SECTION      , , and     CHOLECYSTECTOMY      COLONOSCOPY N/A 3/9/2018    Dr ReevesIjyhsp-Awjprdyvdcatdp-Qdmxyccihyhyd AP (-) dysplasia x 1, tubular AP x  (-) dysplasia x 1, HP x 4--1 yr recall    DIALYSIS FISTULA CREATION Left 4/23/15 [facility]    Left proximal ulnar artery to cephalic vein AVF creation    EYE SURGERY      laser, several times     TYMPANOSTOMY TUBE PLACEMENT Bilateral     VASCULAR SURGERY Left 5/11/15 [facility]    US-guided cannulation left distal upper arm cephalic vein with 4-St Lucian glide sheath; LUE AV f'grams with venography SVC; left cephalic vein BAM with [id number] julieth balloon; completion venogram [facility]    VASCULAR SURGERY Left 5/28/15 [facility]    Percutaneous cannulation left distal radial artery with 4-St Lucian glide sheath; LUE AV f'grams with venography SVC; left cephalic vein balloon a'plasty with [id number] julieth balloon and [id number] julieth balloon; proximal and mid upper arm cephalic vein BAM with [id number] julieth balloon; completion venogram [facility]    VASCULAR SURGERY Left 6/15/15 [facility]    US-guided cannulation left cephalic vein with 4-St Lucian and later 7-St Lucian sheath; LUE AV f'grams including venography SVC; left cephalic vein stenosis balloon a'plasty with [id number] cutting balloon; completion f'gram and venogram [facility]    VASCULAR SURGERY  05/15/2017    [facility]. Left upper fistulograms/venograms.  VASCULAR SURGERY  2018    [facility]. Left upper fistulograms, left subclavian BA 12x40 atlas, left cephalic arch BA 48Y29 conquest.    VASCULAR SURGERY  2019    [facility]. Left upper fistulograms,BA left SCV 8x40 conquest,stent left SCV, viabahn 13x50,left SCV stent BA 12x40 conquest.       Social History     Tobacco Use    Smoking status: Former Smoker     Packs/day: 1.00     Years: 40.00     Pack years: 40.00     Last attempt to quit: 0.5 % ophthalmic gel-forming Place 1 drop into the left eye 2 times daily       dorzolamide (TRUSOPT) 2 % ophthalmic solution Place 1 drop into the left eye 2 times daily       brimonidine (ALPHAGAN P) 0.15 % ophthalmic solution Place 1 drop into the left eye 3 times daily       homatropine 5 % ophthalmic solution Place 1 drop into the left eye daily       pravastatin (PRAVACHOL) 10 MG tablet TAKE 1 TABLET BY MOUTH EVERY NIGHT AT BEDTIME 30 tablet 11    blood glucose monitor kit and supplies 1 kit by Other route 3 times daily Test three times a day & as needed for symptoms of irregular blood glucose. 1 kit 0     No current facility-administered medications for this visit. Allergies   Allergen Reactions    Bupropion Nausea Only    Sulfa Antibiotics      Causes hypoglycemia     Varenicline     Wellbutrin [Bupropion Hcl] Nausea Only    Azithromycin Rash    Penicillins Rash       Family History   Problem Relation Age of Onset    Heart Disease Mother     Heart Attack Mother     Glaucoma Sister     Diabetes Sister     Diabetes Brother     Kidney Disease Brother     Blindness Brother     Stroke Maternal Grandmother     Stroke Maternal Grandfather     Colon Cancer Neg Hx     Colon Polyps Neg Hx     Liver Cancer Neg Hx     Liver Disease Neg Hx     Esophageal Cancer Neg Hx     Rectal Cancer Neg Hx     Stomach Cancer Neg Hx                Subjective:      Review of Systems   Constitutional: Negative. HENT: Negative. Eyes: Negative. Respiratory: Negative. Cardiovascular: Negative. Gastrointestinal: Negative. Endocrine: Negative. Genitourinary: Negative. Musculoskeletal: Negative. Skin: Negative. Neurological: Negative. Hematological: Negative. Psychiatric/Behavioral: Negative. Objective:     Physical Exam   Constitutional: She is oriented to person, place, and time. Vital signs are normal. She appears well-developed and well-nourished.    HENT:   Head: We will continue current medication regimen. Increasing Losartan up to 50 mg to see if this will help with HTN. No other changes at this time. RTC in 6 weeks to monitor readings. Return in about 6 weeks (around 11/6/2019) for HTN, Medication Follow Up. Orders Placed This Encounter   Procedures    POCT glycosylated hemoglobin (Hb A1C)       Orders Placed This Encounter   Medications    lactulose (CHRONULAC) 10 GM/15ML solution     Sig: Take 30 mLs by mouth 3 times daily     Dispense:  1892 mL     Refill:  1    losartan (COZAAR) 50 MG tablet     Sig: Take 1 tablet by mouth daily     Dispense:  30 tablet     Refill:  5        Patient offered educational handouts and has had all questions answered. Patient voices understanding and agrees to plans along with risks and benefits of plan. Patient is instructed to continue prior meds, diet, and exercise plans as instructed. Patient agrees to follow up as instructed and sooner if needed. Patient agrees to go to ER if condition becomes emergent. EMR Dragon/transcription disclaimer: Some of this encounter note is an electronic transcription/translation of spoken language to printed text. The electronic translation of spoken language may permit erroneous, or at times, nonsensical words or phrases to be inadvertently transcribed.  Although I have reviewed the note for such errors, some may still exist.    Electronically signed by RAMONA Diaz on 9/25/2019 at 2:40 PM

## 2019-10-03 ENCOUNTER — APPOINTMENT (OUTPATIENT)
Dept: LAB | Facility: HOSPITAL | Age: 59
End: 2019-10-03

## 2019-10-03 ENCOUNTER — TRANSCRIBE ORDERS (OUTPATIENT)
Dept: ADMINISTRATIVE | Facility: HOSPITAL | Age: 59
End: 2019-10-03

## 2019-10-03 DIAGNOSIS — Z91.15 NONCOMPLIANCE WITH RENAL DIALYSIS: Primary | ICD-10-CM

## 2019-10-03 LAB
ALBUMIN SERPL-MCNC: 4 G/DL (ref 3.5–5.2)
ALBUMIN/GLOB SERPL: 1.5 G/DL
ALP SERPL-CCNC: 76 U/L (ref 39–117)
ALT SERPL W P-5'-P-CCNC: 6 U/L (ref 1–33)
ANION GAP SERPL CALCULATED.3IONS-SCNC: 13 MMOL/L (ref 5–15)
AST SERPL-CCNC: 13 U/L (ref 1–32)
BILIRUB SERPL-MCNC: 0.2 MG/DL (ref 0.2–1.2)
BUN BLD-MCNC: 56 MG/DL (ref 8–23)
BUN/CREAT SERPL: 8.5 (ref 7–25)
CALCIUM SPEC-SCNC: 8.5 MG/DL (ref 8.6–10.5)
CHLORIDE SERPL-SCNC: 108 MMOL/L (ref 98–107)
CO2 SERPL-SCNC: 23 MMOL/L (ref 22–29)
CREAT BLD-MCNC: 6.57 MG/DL (ref 0.57–1)
DEPRECATED RDW RBC AUTO: 51.8 FL (ref 37–54)
ERYTHROCYTE [DISTWIDTH] IN BLOOD BY AUTOMATED COUNT: 14.6 % (ref 12.3–15.4)
GFR SERPL CREATININE-BSD FRML MDRD: 6 ML/MIN/1.73
GFR SERPL CREATININE-BSD FRML MDRD: ABNORMAL ML/MIN/{1.73_M2}
GLOBULIN UR ELPH-MCNC: 2.7 GM/DL
GLUCOSE BLD-MCNC: 142 MG/DL (ref 65–99)
HCT VFR BLD AUTO: 34.2 % (ref 34–46.6)
HGB BLD-MCNC: 11.4 G/DL (ref 12–15.9)
MCH RBC QN AUTO: 32.2 PG (ref 26.6–33)
MCHC RBC AUTO-ENTMCNC: 33.3 G/DL (ref 31.5–35.7)
MCV RBC AUTO: 96.6 FL (ref 79–97)
PLATELET # BLD AUTO: 266 10*3/MM3 (ref 140–450)
PMV BLD AUTO: 8.4 FL (ref 6–12)
POTASSIUM BLD-SCNC: 4.9 MMOL/L (ref 3.5–5.2)
PROT SERPL-MCNC: 6.7 G/DL (ref 6–8.5)
RBC # BLD AUTO: 3.54 10*6/MM3 (ref 3.77–5.28)
SODIUM BLD-SCNC: 144 MMOL/L (ref 136–145)
WBC NRBC COR # BLD: 5.46 10*3/MM3 (ref 3.4–10.8)

## 2019-10-03 PROCEDURE — 85027 COMPLETE CBC AUTOMATED: CPT | Performed by: CLINICAL NURSE SPECIALIST

## 2019-10-03 PROCEDURE — 80053 COMPREHEN METABOLIC PANEL: CPT | Performed by: CLINICAL NURSE SPECIALIST

## 2019-10-03 PROCEDURE — 36415 COLL VENOUS BLD VENIPUNCTURE: CPT | Performed by: CLINICAL NURSE SPECIALIST

## 2019-10-04 ENCOUNTER — READMISSION MANAGEMENT (OUTPATIENT)
Dept: CALL CENTER | Facility: HOSPITAL | Age: 59
End: 2019-10-04

## 2019-10-04 DIAGNOSIS — F41.9 ANXIETY: ICD-10-CM

## 2019-10-04 RX ORDER — SERTRALINE HYDROCHLORIDE 25 MG/1
25 TABLET, FILM COATED ORAL DAILY
Qty: 30 TABLET | Refills: 11 | Status: SHIPPED | OUTPATIENT
Start: 2019-10-04 | End: 2020-08-07

## 2019-10-04 NOTE — OUTREACH NOTE
COPD/PN Week 4 Survey      Responses   Facility patient discharged from?  [place]   Does the patient have one of the following disease processes/diagnoses(primary or secondary)?  COPD/Pneumonia   Was the primary reason for admission:  Pneumonia   Week 4 attempt successful?  Yes   Call start time  1516   Call end time  1518   General alerts for this patient  Dialysis T/Th/Sat at [place]   Discharge diagnosis  Pneumonia of LINDA d/t strep, IDDM II, ESRD on dialysis, diabetic nephropathy, glaucoma, HTN   Meds reviewed with patient/caregiver?  Yes   Is the patient having any side effects they believe may be caused by any medication additions or changes?  No   Is the patient taking all medications as directed (includes completed medication regime)?  Yes   Has the patient kept scheduled appointments due by today?  Yes   Is the patient still receiving Home Health Services?  N/A   Psychosocial issues?  No   What is the patient's perception of their health status since discharge?  Improving   Nursing Interventions  Nurse provided patient education   Are the patient's immunizations up to date?   No   Is the patient/caregiver able to teach back the hierarchy of who to call/visit for symptoms/problems? PCP, Specialist, Home health nurse, Urgent Care, ED, 911  Yes   Is the patient/caregiver able to teach back signs and symptoms of worsening condition:  Fever/chills, Shortness of breath, Chest pain   Is the patient/caregiver able to teach back importance of completing antibiotic course of treatment?  Yes   Week 4 call completed?  Yes   Would the patient like one additional call?  No   Graduated  Yes          Sterling Ramon RN

## 2019-11-04 ENCOUNTER — TRANSCRIBE ORDERS (OUTPATIENT)
Dept: ADMINISTRATIVE | Facility: HOSPITAL | Age: 59
End: 2019-11-04

## 2019-11-04 ENCOUNTER — APPOINTMENT (OUTPATIENT)
Dept: LAB | Facility: HOSPITAL | Age: 59
End: 2019-11-04

## 2019-11-04 DIAGNOSIS — N18.6 END STAGE RENAL DISEASE (HCC): Primary | ICD-10-CM

## 2019-11-04 LAB
ALBUMIN SERPL-MCNC: 4.2 G/DL (ref 3.5–5.2)
ALBUMIN/GLOB SERPL: 1.4 G/DL
ALP SERPL-CCNC: 70 U/L (ref 39–117)
ALT SERPL W P-5'-P-CCNC: 5 U/L (ref 1–33)
ANION GAP SERPL CALCULATED.3IONS-SCNC: 17.9 MMOL/L (ref 5–15)
AST SERPL-CCNC: 12 U/L (ref 1–32)
BILIRUB SERPL-MCNC: 0.2 MG/DL (ref 0.2–1.2)
BUN BLD-MCNC: 84 MG/DL (ref 8–23)
BUN/CREAT SERPL: 8.2 (ref 7–25)
CALCIUM SPEC-SCNC: 9 MG/DL (ref 8.6–10.5)
CHLORIDE SERPL-SCNC: 108 MMOL/L (ref 98–107)
CO2 SERPL-SCNC: 18.1 MMOL/L (ref 22–29)
CREAT BLD-MCNC: 10.29 MG/DL (ref 0.57–1)
DEPRECATED RDW RBC AUTO: 48.2 FL (ref 37–54)
ERYTHROCYTE [DISTWIDTH] IN BLOOD BY AUTOMATED COUNT: 14.2 % (ref 12.3–15.4)
GFR SERPL CREATININE-BSD FRML MDRD: 4 ML/MIN/1.73
GFR SERPL CREATININE-BSD FRML MDRD: ABNORMAL ML/MIN/{1.73_M2}
GLOBULIN UR ELPH-MCNC: 3 GM/DL
GLUCOSE BLD-MCNC: 100 MG/DL (ref 65–99)
HCT VFR BLD AUTO: 31.4 % (ref 34–46.6)
HGB BLD-MCNC: 10.6 G/DL (ref 12–15.9)
MCH RBC QN AUTO: 31.9 PG (ref 26.6–33)
MCHC RBC AUTO-ENTMCNC: 33.8 G/DL (ref 31.5–35.7)
MCV RBC AUTO: 94.6 FL (ref 79–97)
PLATELET # BLD AUTO: 271 10*3/MM3 (ref 140–450)
PMV BLD AUTO: 9.1 FL (ref 6–12)
POTASSIUM BLD-SCNC: 5.6 MMOL/L (ref 3.5–5.2)
PROT SERPL-MCNC: 7.2 G/DL (ref 6–8.5)
RBC # BLD AUTO: 3.32 10*6/MM3 (ref 3.77–5.28)
SODIUM BLD-SCNC: 144 MMOL/L (ref 136–145)
WBC NRBC COR # BLD: 6.61 10*3/MM3 (ref 3.4–10.8)

## 2019-11-04 PROCEDURE — 80053 COMPREHEN METABOLIC PANEL: CPT | Performed by: CLINICAL NURSE SPECIALIST

## 2019-11-04 PROCEDURE — 36415 COLL VENOUS BLD VENIPUNCTURE: CPT | Performed by: CLINICAL NURSE SPECIALIST

## 2019-11-04 PROCEDURE — 85027 COMPLETE CBC AUTOMATED: CPT | Performed by: CLINICAL NURSE SPECIALIST

## 2019-11-06 ENCOUNTER — TELEPHONE (OUTPATIENT)
Dept: BARIATRICS/WEIGHT MGMT | Facility: CLINIC | Age: 59
End: 2019-11-06

## 2019-12-06 ENCOUNTER — APPOINTMENT (OUTPATIENT)
Dept: GENERAL RADIOLOGY | Facility: HOSPITAL | Age: 59
End: 2019-12-06

## 2019-12-06 ENCOUNTER — HOSPITAL ENCOUNTER (INPATIENT)
Facility: HOSPITAL | Age: 59
LOS: 5 days | Discharge: HOME | End: 2019-12-11
Attending: INTERNAL MEDICINE | Admitting: INTERNAL MEDICINE

## 2019-12-06 DIAGNOSIS — E87.70 HYPERVOLEMIA, UNSPECIFIED HYPERVOLEMIA TYPE: ICD-10-CM

## 2019-12-06 DIAGNOSIS — I10 HYPERTENSION, UNSPECIFIED TYPE: ICD-10-CM

## 2019-12-06 DIAGNOSIS — E87.5 HYPERKALEMIA: ICD-10-CM

## 2019-12-06 DIAGNOSIS — R06.00 DYSPNEA, UNSPECIFIED TYPE: ICD-10-CM

## 2019-12-06 DIAGNOSIS — N18.6 END STAGE RENAL FAILURE ON DIALYSIS (HCC): Primary | ICD-10-CM

## 2019-12-06 DIAGNOSIS — Z99.2 END STAGE RENAL FAILURE ON DIALYSIS (HCC): Primary | ICD-10-CM

## 2019-12-06 DIAGNOSIS — Z74.09 IMPAIRED MOBILITY: ICD-10-CM

## 2019-12-06 PROBLEM — D63.1 ANEMIA DUE TO CHRONIC KIDNEY DISEASE, ON CHRONIC DIALYSIS (HCC): Status: ACTIVE | Noted: 2019-12-06

## 2019-12-06 PROBLEM — R79.89 ELEVATED TROPONIN: Status: ACTIVE | Noted: 2019-12-06

## 2019-12-06 PROBLEM — R77.8 ELEVATED TROPONIN: Status: ACTIVE | Noted: 2019-12-06

## 2019-12-06 PROBLEM — G93.41 ENCEPHALOPATHY, METABOLIC: Status: ACTIVE | Noted: 2019-12-06

## 2019-12-06 PROBLEM — E87.29 HIGH ANION GAP METABOLIC ACIDOSIS: Status: ACTIVE | Noted: 2019-12-06

## 2019-12-06 PROBLEM — Z91.158 NON-COMPLIANCE WITH RENAL DIALYSIS: Status: ACTIVE | Noted: 2019-12-06

## 2019-12-06 PROBLEM — J81.0 ACUTE PULMONARY EDEMA (HCC): Status: ACTIVE | Noted: 2019-12-06

## 2019-12-06 PROBLEM — Z91.15 NON-COMPLIANCE WITH RENAL DIALYSIS: Status: ACTIVE | Noted: 2019-12-06

## 2019-12-06 LAB
ABO GROUP BLD: NORMAL
ALBUMIN SERPL-MCNC: 4.4 G/DL (ref 3.5–5.2)
ALBUMIN/GLOB SERPL: 1.4 G/DL
ALP SERPL-CCNC: 66 U/L (ref 39–117)
ALT SERPL W P-5'-P-CCNC: 9 U/L (ref 1–33)
ANION GAP SERPL CALCULATED.3IONS-SCNC: 29 MMOL/L (ref 5–15)
AST SERPL-CCNC: 14 U/L (ref 1–32)
BASOPHILS # BLD AUTO: 0.03 10*3/MM3 (ref 0–0.2)
BASOPHILS NFR BLD AUTO: 0.6 % (ref 0–1.5)
BILIRUB SERPL-MCNC: 0.3 MG/DL (ref 0.2–1.2)
BLD GP AB SCN SERPL QL: NEGATIVE
BUN BLD-MCNC: 123 MG/DL (ref 8–23)
BUN/CREAT SERPL: 8.7 (ref 7–25)
CALCIUM SPEC-SCNC: 9.3 MG/DL (ref 8.6–10.5)
CHLORIDE SERPL-SCNC: 110 MMOL/L (ref 98–107)
CO2 SERPL-SCNC: 8 MMOL/L (ref 22–29)
CREAT BLD-MCNC: 14.18 MG/DL (ref 0.57–1)
DEPRECATED RDW RBC AUTO: 51.3 FL (ref 37–54)
DEVELOPER EXPIRATION DATE: NORMAL
DEVELOPER LOT NUMBER: 157
EOSINOPHIL # BLD AUTO: 0.22 10*3/MM3 (ref 0–0.4)
EOSINOPHIL NFR BLD AUTO: 4.5 % (ref 0.3–6.2)
ERYTHROCYTE [DISTWIDTH] IN BLOOD BY AUTOMATED COUNT: 14.5 % (ref 12.3–15.4)
EXPIRATION DATE: NORMAL
FECAL OCCULT BLOOD SCREEN, POC: NEGATIVE
GFR SERPL CREATININE-BSD FRML MDRD: 3 ML/MIN/1.73
GFR SERPL CREATININE-BSD FRML MDRD: ABNORMAL ML/MIN/{1.73_M2}
GLOBULIN UR ELPH-MCNC: 3.2 GM/DL
GLUCOSE BLD-MCNC: 72 MG/DL (ref 65–99)
GLUCOSE BLDC GLUCOMTR-MCNC: 78 MG/DL (ref 70–130)
HAV IGM SERPL QL IA: NORMAL
HBV CORE IGM SERPL QL IA: NORMAL
HBV SURFACE AG SERPL QL IA: NORMAL
HCT VFR BLD AUTO: 23.8 % (ref 34–46.6)
HCV AB SER DONR QL: NORMAL
HGB BLD-MCNC: 7.9 G/DL (ref 12–15.9)
HOLD SPECIMEN: NORMAL
HOLD SPECIMEN: NORMAL
IMM GRANULOCYTES # BLD AUTO: 0.02 10*3/MM3 (ref 0–0.05)
IMM GRANULOCYTES NFR BLD AUTO: 0.4 % (ref 0–0.5)
INR PPP: 1.19 (ref 0.91–1.09)
LYMPHOCYTES # BLD AUTO: 1.06 10*3/MM3 (ref 0.7–3.1)
LYMPHOCYTES NFR BLD AUTO: 21.5 % (ref 19.6–45.3)
Lab: 157
MCH RBC QN AUTO: 32 PG (ref 26.6–33)
MCHC RBC AUTO-ENTMCNC: 33.2 G/DL (ref 31.5–35.7)
MCV RBC AUTO: 96.4 FL (ref 79–97)
MONOCYTES # BLD AUTO: 0.29 10*3/MM3 (ref 0.1–0.9)
MONOCYTES NFR BLD AUTO: 5.9 % (ref 5–12)
NEGATIVE CONTROL: NEGATIVE
NEUTROPHILS # BLD AUTO: 3.32 10*3/MM3 (ref 1.7–7)
NEUTROPHILS NFR BLD AUTO: 67.1 % (ref 42.7–76)
NRBC BLD AUTO-RTO: 0.4 /100 WBC (ref 0–0.2)
NT-PROBNP SERPL-MCNC: ABNORMAL PG/ML (ref 5–900)
PLATELET # BLD AUTO: 278 10*3/MM3 (ref 140–450)
PMV BLD AUTO: 9.2 FL (ref 6–12)
POSITIVE CONTROL: POSITIVE
POTASSIUM BLD-SCNC: 5.3 MMOL/L (ref 3.5–5.2)
PROT SERPL-MCNC: 7.6 G/DL (ref 6–8.5)
PROTHROMBIN TIME: 15.5 SECONDS (ref 11.9–14.6)
PTH-INTACT SERPL-MCNC: 359.5 PG/ML (ref 15–65)
RBC # BLD AUTO: 2.47 10*6/MM3 (ref 3.77–5.28)
RH BLD: POSITIVE
SODIUM BLD-SCNC: 147 MMOL/L (ref 136–145)
T&S EXPIRATION DATE: NORMAL
TROPONIN T SERPL-MCNC: 0.05 NG/ML (ref 0–0.03)
WBC NRBC COR # BLD: 4.94 10*3/MM3 (ref 3.4–10.8)
WHOLE BLOOD HOLD SPECIMEN: NORMAL
WHOLE BLOOD HOLD SPECIMEN: NORMAL

## 2019-12-06 PROCEDURE — 36415 COLL VENOUS BLD VENIPUNCTURE: CPT

## 2019-12-06 PROCEDURE — 71045 X-RAY EXAM CHEST 1 VIEW: CPT

## 2019-12-06 PROCEDURE — 83970 ASSAY OF PARATHORMONE: CPT | Performed by: INTERNAL MEDICINE

## 2019-12-06 PROCEDURE — 82962 GLUCOSE BLOOD TEST: CPT

## 2019-12-06 PROCEDURE — 93010 ELECTROCARDIOGRAM REPORT: CPT | Performed by: INTERNAL MEDICINE

## 2019-12-06 PROCEDURE — 82270 OCCULT BLOOD FECES: CPT | Performed by: NURSE PRACTITIONER

## 2019-12-06 PROCEDURE — 86900 BLOOD TYPING SEROLOGIC ABO: CPT | Performed by: NURSE PRACTITIONER

## 2019-12-06 PROCEDURE — 80074 ACUTE HEPATITIS PANEL: CPT | Performed by: INTERNAL MEDICINE

## 2019-12-06 PROCEDURE — 25010000002 HEPARIN (PORCINE) PER 1000 UNITS: Performed by: INTERNAL MEDICINE

## 2019-12-06 PROCEDURE — 93005 ELECTROCARDIOGRAM TRACING: CPT | Performed by: NURSE PRACTITIONER

## 2019-12-06 PROCEDURE — 85610 PROTHROMBIN TIME: CPT | Performed by: NURSE PRACTITIONER

## 2019-12-06 PROCEDURE — 86706 HEP B SURFACE ANTIBODY: CPT | Performed by: INTERNAL MEDICINE

## 2019-12-06 PROCEDURE — 99284 EMERGENCY DEPT VISIT MOD MDM: CPT

## 2019-12-06 PROCEDURE — 86850 RBC ANTIBODY SCREEN: CPT | Performed by: NURSE PRACTITIONER

## 2019-12-06 PROCEDURE — 84484 ASSAY OF TROPONIN QUANT: CPT | Performed by: NURSE PRACTITIONER

## 2019-12-06 PROCEDURE — 86901 BLOOD TYPING SEROLOGIC RH(D): CPT | Performed by: NURSE PRACTITIONER

## 2019-12-06 PROCEDURE — 36415 COLL VENOUS BLD VENIPUNCTURE: CPT | Performed by: INTERNAL MEDICINE

## 2019-12-06 PROCEDURE — 80053 COMPREHEN METABOLIC PANEL: CPT | Performed by: EMERGENCY MEDICINE

## 2019-12-06 PROCEDURE — 85025 COMPLETE CBC W/AUTO DIFF WBC: CPT | Performed by: EMERGENCY MEDICINE

## 2019-12-06 PROCEDURE — 83880 ASSAY OF NATRIURETIC PEPTIDE: CPT | Performed by: NURSE PRACTITIONER

## 2019-12-06 PROCEDURE — 5A1D70Z PERFORMANCE OF URINARY FILTRATION, INTERMITTENT, LESS THAN 6 HOURS PER DAY: ICD-10-PCS | Performed by: INTERNAL MEDICINE

## 2019-12-06 RX ORDER — SODIUM CHLORIDE 0.9 % (FLUSH) 0.9 %
10 SYRINGE (ML) INJECTION AS NEEDED
Status: DISCONTINUED | OUTPATIENT
Start: 2019-12-06 | End: 2019-12-11 | Stop reason: HOSPADM

## 2019-12-06 RX ORDER — ONDANSETRON 4 MG/1
4 TABLET, FILM COATED ORAL EVERY 6 HOURS PRN
Status: DISCONTINUED | OUTPATIENT
Start: 2019-12-06 | End: 2019-12-11 | Stop reason: HOSPADM

## 2019-12-06 RX ORDER — ACETAMINOPHEN 160 MG/5ML
650 SOLUTION ORAL EVERY 4 HOURS PRN
Status: DISCONTINUED | OUTPATIENT
Start: 2019-12-06 | End: 2019-12-11 | Stop reason: HOSPADM

## 2019-12-06 RX ORDER — ACETAMINOPHEN 325 MG/1
650 TABLET ORAL EVERY 4 HOURS PRN
Status: DISCONTINUED | OUTPATIENT
Start: 2019-12-06 | End: 2019-12-11 | Stop reason: HOSPADM

## 2019-12-06 RX ORDER — ONDANSETRON 2 MG/ML
4 INJECTION INTRAMUSCULAR; INTRAVENOUS EVERY 6 HOURS PRN
Status: DISCONTINUED | OUTPATIENT
Start: 2019-12-06 | End: 2019-12-11 | Stop reason: HOSPADM

## 2019-12-06 RX ORDER — HEPARIN SODIUM 5000 [USP'U]/ML
5000 INJECTION, SOLUTION INTRAVENOUS; SUBCUTANEOUS EVERY 8 HOURS SCHEDULED
Status: DISCONTINUED | OUTPATIENT
Start: 2019-12-06 | End: 2019-12-11 | Stop reason: HOSPADM

## 2019-12-06 RX ORDER — SODIUM CHLORIDE 0.9 % (FLUSH) 0.9 %
10 SYRINGE (ML) INJECTION EVERY 12 HOURS SCHEDULED
Status: DISCONTINUED | OUTPATIENT
Start: 2019-12-06 | End: 2019-12-11

## 2019-12-06 RX ORDER — ACETAMINOPHEN 650 MG/1
650 SUPPOSITORY RECTAL EVERY 4 HOURS PRN
Status: DISCONTINUED | OUTPATIENT
Start: 2019-12-06 | End: 2019-12-11 | Stop reason: HOSPADM

## 2019-12-06 RX ORDER — GABAPENTIN 100 MG/1
100 CAPSULE ORAL EVERY 12 HOURS SCHEDULED
Status: DISCONTINUED | OUTPATIENT
Start: 2019-12-06 | End: 2019-12-11 | Stop reason: HOSPADM

## 2019-12-06 RX ADMIN — GABAPENTIN 100 MG: 100 CAPSULE ORAL at 20:21

## 2019-12-06 RX ADMIN — HEPARIN SODIUM 5000 UNITS: 5000 INJECTION, SOLUTION INTRAVENOUS; SUBCUTANEOUS at 23:29

## 2019-12-06 RX ADMIN — ACETAMINOPHEN 650 MG: 325 TABLET, FILM COATED ORAL at 23:29

## 2019-12-06 RX ADMIN — SODIUM CHLORIDE, PRESERVATIVE FREE 10 ML: 5 INJECTION INTRAVENOUS at 23:32

## 2019-12-06 NOTE — ED PROVIDER NOTES
"Subjective   Patient is a 59-year-old white female presents emergency department with for the past 3 to 4 days.  She denies chest pain.  She denies cough.  Patient does also have a history of end-stage renal disease and has decided to stop her dialysis or months ago because \"I just got mad at them and did not want to go anymore.\"  She states she has not advised her PCP that she has done this.  She denies any fever or chills.  No nausea or vomiting.  She states today she is feeling very weak as well.  Evidently she and her  are both signed into the emergency department to be evaluated.      History provided by:  Patient   used: No        Review of Systems   Constitutional: Negative.    HENT: Negative.    Eyes: Negative.    Respiratory:        Patient is a 59-year-old white female presents emergency department with for the past 3 to 4 days.  She denies chest pain.  She denies cough.  Patient does also have a history of end-stage renal disease and has decided to stop her dialysis or months ago because \"I just got mad at them and did not want to go anymore.\"   She states that she has been on dialysis for the past 3 years. She states she has not advised her PCP that she has done this.  She denies any fever or chills.  No nausea or vomiting.  She states today she is feeling very weak as well.  Evidently she and her  are both signed into the emergency department to be evaluated.      Cardiovascular: Negative.    Gastrointestinal: Negative.    Endocrine: Negative.    Genitourinary: Negative.    Musculoskeletal: Negative.    Skin: Negative.    Allergic/Immunologic: Negative.    Neurological: Negative.    Hematological: Negative.    Psychiatric/Behavioral: Negative.    All other systems reviewed and are negative.      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    • Chronic otitis media    • Diabetes (CMS/[facility])    • Eustachian tube dysfunction  "   • Glaucoma    • HTN (hypertension)    • Kidney failure     on dialysis   • Liver disorder    • Mucoid otitis media        Allergies   Allergen Reactions   • Bupropion Nausea Only   • Chantix [Varenicline]    • Sulfa Antibiotics    • Azithromycin Rash   • Levofloxacin Rash   • Penicillins Rash       Past Surgical History:   Procedure Laterality Date   • ARTERIOVENOUS FISTULA     • CATARACT EXTRACTION WITH INTRAOCULAR LENS IMPLANT     •  SECTION     • CHOLECYSTECTOMY     • MYRINGOTOMY W/ TUBES Bilateral    • MYRINGOTOMY W/ TUBES Right    • TUBAL ABDOMINAL LIGATION         Family History   Problem Relation Age of Onset   • Heart disease Mother        Social History     Socioeconomic History   • Marital status:      Spouse name: Not on file   • Number of children: Not on file   • Years of education: Not on file   • Highest education level: Not on file   Tobacco Use   • Smoking status: Current Every Day Smoker     Packs/day: 0.50     Years: 6.00     Pack years: 3.00     Types: Cigarettes   • Smokeless tobacco: Never Used   Substance and Sexual Activity   • Alcohol use: No   • Drug use: No   • Sexual activity: Defer       Prior to Admission medications    Medication Sig Start Date End Date Taking? Authorizing Provider   aspirin 81 MG EC tablet Take 81 mg by mouth Daily.    Steve Warren MD   benzonatate (TESSALON) 100 MG capsule Take 1 capsule by mouth 3 (Three) Times a Day As Needed for Cough. 19   Guru Zacarias APRN   brimonidine (ALPHAGAN) 0.2 % ophthalmic solution Administer 1 drop into the left eye 3 (Three) Times a Day.    Steve Warren MD   calcitriol (ROCALTROL) 0.25 MCG capsule Take 0.25 mcg by mouth Take As Directed. Only given on dialysis days (Tuesday, Thursday, Saturday)    Steve Warren MD   cetirizine (zyrTEC) 10 MG tablet Take 10 mg by mouth At Night As Needed (allergies).    Steve Warren MD   cholecalciferol (VITAMIN D3) 1000 units tablet Take  1,000 Units by mouth Daily.    Steve Warren MD   dorzolamide (TRUSOPT) 2 % ophthalmic solution Administer 1 drop into the left eye 2 (Two) Times a Day.    Steve Warren MD   famotidine (PEPCID) 20 MG tablet Take 20 mg by mouth 2 (Two) Times a Day.    Steve Warren MD   fluticasone (FLONASE) 50 MCG/ACT nasal spray 1 spray into the nostril(s) as directed by provider 2 (Two) Times a Day.    Steve Warren MD   guaiFENesin (MUCINEX) 600 MG 12 hr tablet Take 2 tablets by mouth Every 12 (Twelve) Hours. 9/5/19   Guru Zacarias APRN   homatropine 5 % ophthalmic solution Administer 1 drop into the left eye Daily.    Steve Warren MD   insulin glargine (LANTUS) 100 UNIT/ML injection Inject 10 Units under the skin Every Night.    Steve Warren MD   lidocaine-prilocaine (EMLA) 2.5-2.5 % cream Apply 1 application topically to the appropriate area as directed Take As Directed. Apply to access area 30 minutes prior to dialysis.     Steve Warren MD   losartan (COZAAR) 25 MG tablet Take 25 mg by mouth Daily.    Steve Warren MD   midodrine (PROAMATINE) 5 MG tablet Take 5 mg by mouth Take As Directed. as needed for low blood pressure during dialysis.    Steve Warren MD   Netarsudil Dimesylate (RHOPRESSA) 0.02 % solution Administer 1 drop into the left eye Every Night.    Steve Warren MD   pravastatin (PRAVACHOL) 10 MG tablet Take 10 mg by mouth Every Night.    Steve Warren MD   predniSONE (DELTASONE) 20 MG tablet  Take 4 tablets by mouth daily for 3 days, 2 tablets daily for 3 days, 1 tablet daily for 3 days then stop 9/5/19   Guru Zacarias APRN   sertraline (ZOLOFT) 25 MG tablet Take 25 mg by mouth Every Night.    Steve Warren MD   sevelamer (RENAGEL) 800 MG tablet Take 1,600 mg by mouth 3 (Three) Times a Day With Meals.    Steve Warren MD   timolol (TIMOPTIC) 0.5 % ophthalmic solution Administer 1 drop into the  "left eye Every Morning.    Provider, MD Steve   travoprost, JUDE free, (TRAVATAN Z) 0.004 % solution ophthalmic solution Administer 1 drop into the left eye Every Night.    Provider, MD Steve       /52 (BP Location: Right arm, Patient Position: Lying)   Pulse 86   Temp 98.3 °F (36.8 °C) (Oral)   Resp 18   Ht 149.9 cm (59\")   Wt 61.2 kg (135 lb)   SpO2 96%   BMI 27.27 kg/m²     Objective   Physical Exam   Constitutional: She is oriented to person, place, and time. She appears well-developed and well-nourished.   Chronically ill appearing. No acute distress. Non toxic appearing    HENT:   Head: Normocephalic and atraumatic.   Eyes: Conjunctivae and EOM are normal. Pupils are equal, round, and reactive to light.   Neck: Normal range of motion. Neck supple. No tracheal deviation present. No thyromegaly present.   Cardiovascular: Normal rate, regular rhythm, normal heart sounds and intact distal pulses.   Pulmonary/Chest: Effort normal and breath sounds normal. No respiratory distress. She has no wheezes. She has no rales. She exhibits no tenderness.   Abdominal: Soft. Bowel sounds are normal.   Musculoskeletal: Normal range of motion.   Neurological: She is alert and oriented to person, place, and time. She has normal reflexes. No cranial nerve deficit.   Skin: Skin is warm and dry.   Sl pallor noted    Psychiatric: She has a normal mood and affect. Her behavior is normal. Judgment and thought content normal.   Nursing note and vitals reviewed.      Procedures         Lab Results (last 24 hours)     Procedure Component Value Units Date/Time    CBC & Differential [[id number]] Collected:  12/06/19 1144    Specimen:  Blood Updated:  12/06/19 1159    Narrative:       The following orders were created for panel order CBC & Differential.  Procedure                               Abnormality         Status                     ---------                               -----------         ------               "       CBC Auto Differential[[id number]]        Abnormal            Final result                 Please view results for these tests on the individual orders.    Comprehensive Metabolic Panel [[id number]]  (Abnormal) Collected:  12/06/19 1144    Specimen:  Blood Updated:  12/06/19 1229     Glucose 72 mg/dL       mg/dL      Creatinine 14.18 mg/dL      Sodium 147 mmol/L      Potassium 5.3 mmol/L      Chloride 110 mmol/L      CO2 8.0 mmol/L      Calcium 9.3 mg/dL      Total Protein 7.6 g/dL      Albumin 4.40 g/dL      ALT (SGPT) 9 U/L      AST (SGOT) 14 U/L      Alkaline Phosphatase 66 U/L      Total Bilirubin 0.3 mg/dL      eGFR Non African Amer 3 mL/min/1.73      Comment: <15 Indicative of kidney failure.        eGFR   Amer --     Comment: <15 Indicative of kidney failure.        Globulin 3.2 gm/dL      A/G Ratio 1.4 g/dL      BUN/Creatinine Ratio 8.7     Anion Gap 29.0 mmol/L     Narrative:       GFR Normal >60  Chronic Kidney Disease <60  Kidney Failure <15    CBC Auto Differential [[id number]]  (Abnormal) Collected:  12/06/19 1144    Specimen:  Blood Updated:  12/06/19 1159     WBC 4.94 10*3/mm3      RBC 2.47 10*6/mm3      Hemoglobin 7.9 g/dL      Hematocrit 23.8 %      MCV 96.4 fL      MCH 32.0 pg      MCHC 33.2 g/dL      RDW 14.5 %      RDW-SD 51.3 fl      MPV 9.2 fL      Platelets 278 10*3/mm3      Neutrophil % 67.1 %      Lymphocyte % 21.5 %      Monocyte % 5.9 %      Eosinophil % 4.5 %      Basophil % 0.6 %      Immature Grans % 0.4 %      Neutrophils, Absolute 3.32 10*3/mm3      Lymphocytes, Absolute 1.06 10*3/mm3      Monocytes, Absolute 0.29 10*3/mm3      Eosinophils, Absolute 0.22 10*3/mm3      Basophils, Absolute 0.03 10*3/mm3      Immature Grans, Absolute 0.02 10*3/mm3      nRBC 0.4 /100 WBC     Protime-INR [[id number]]  (Abnormal) Collected:  12/06/19 1144    Specimen:  Blood Updated:  12/06/19 1241     Protime 15.5 Seconds      INR 1.19    BNP [[id number]]  (Abnormal) Collected:  12/06/19 1142     Specimen:  Blood Updated:  12/06/19 1301     proBNP 40,279.0 pg/mL     Narrative:       Among patients with dyspnea, NT-proBNP is highly sensitive for the detection of acute congestive heart failure. In addition NT-proBNP of <300 pg/ml effectively rules out acute congestive heart failure with 99% negative predictive value.    Troponin [[id number]]  (Abnormal) Collected:  12/06/19 1144    Specimen:  Blood Updated:  12/06/19 1255     Troponin T 0.047 ng/mL     Narrative:       Troponin T Reference Range:  <= 0.03 ng/mL-   Negative for AMI  >0.03 ng/mL-     Abnormal for myocardial necrosis.  Clinicians would have to utilize clinical acumen, EKG, Troponin and serial changes to determine if it is an Acute Myocardial Infarction or myocardial injury due to an underlying chronic condition.     POC Occult Blood Stool [[id number]]  (Normal) Collected:  12/06/19 1250    Specimen:  Stool Updated:  12/06/19 1330     Fecal Occult Blood Negative     Lot Number [id number]     Expiration Date 05/31/2020     DEVELOPER LOT NUMBER 157     DEVELOPER EXPIRATION DATE 05/31/2020     Positive Control Positive     Negative Control Negative    POC Glucose Once [[id number]]  (Normal) Collected:  12/06/19 1856    Specimen:  Blood Updated:  12/06/19 1907     Glucose 78 mg/dL      Comment: : [id number] Yaya Brunomariela ID: [id number]       Hepatitis Panel, Acute [[id number]]  (Normal) Collected:  12/06/19 1942    Specimen:  Blood Updated:  12/06/19 2019     Hepatitis B Surface Ag Non-Reactive     Hep A IgM Non-Reactive     Hep B C IgM Non-Reactive     Hepatitis C Ab Non-Reactive    Hepatitis B Surface Antibody [[id number]] Collected:  12/06/19 1942    Specimen:  Blood Updated:  12/06/19 1946    PTH, Intact [[id number]]  (Abnormal) Collected:  12/06/19 1942    Specimen:  Blood Updated:  12/06/19 2008     PTH, Intact 359.5 pg/mL     Iron Profile [[id number]]  (Abnormal) Collected:  12/07/19 0345    Specimen:  Blood Updated:  12/07/19 0443     Iron  61 mcg/dL      Iron Saturation 27 %      Transferrin 153 mg/dL      TIBC 228 mcg/dL     Renal Function Panel [[id number]]  (Abnormal) Collected:  12/07/19 0345    Specimen:  Blood Updated:  12/07/19 0450     Glucose 116 mg/dL      BUN 35 mg/dL      Creatinine 6.54 mg/dL      Sodium 138 mmol/L      Potassium 3.2 mmol/L      Chloride 94 mmol/L      CO2 23.0 mmol/L      Calcium 8.9 mg/dL      Albumin 3.90 g/dL      Phosphorus 5.2 mg/dL      Anion Gap 21.0 mmol/L      BUN/Creatinine Ratio 5.4     eGFR Non African Amer 6 mL/min/1.73      Comment: <15 Indicative of kidney failure.        eGFR   Amer --     Comment: <15 Indicative of kidney failure.       Narrative:       GFR Normal >60  Chronic Kidney Disease <60  Kidney Failure <15            XR Chest 1 View   Final Result   1. Bilateral interstitial and basilar consolidative infiltrates. There   appear to be subpleural Kerley B lines in the right base, suggesting a   volume overload with interstitial and casey pulmonary edema. This seems   less likely infectious.           This report was finalized on 12/06/2019 13:35 by Dr Jhonatan Berry, .          ED Course  ED Course as of Dec 07 0836   Fri Dec 06, 2019   1338 Call placed to nephrology at this time.    [CW]   1346 Reviewed pt and pt care plan with dr donahue- also in agreement with pt care plan- call placed to hospitalist at this time for further     [CW]   1358 Spoke with dr quesada- will take to dialysis at this time. Pt in agreement. She states that she knows that she needs it but she stopped in sept because she could not make it to her appointments and was having to pay people to take her. She is in agreement with receiving dialysis today. Pending call from dialysis at this time     [CW]   1401 Spoke with dr gonzales- has accepted for admission. Advised to admit to dr smith    [CW]      ED Course User Index  [CW] Daniela Esteban, APRN          MDM  Number of Diagnoses or Management Options  Dyspnea,  unspecified type: minor  End stage renal failure on dialysis (CMS/[facility]): established and worsening  Hypervolemia, unspecified hypervolemia type: established and worsening     Amount and/or Complexity of Data Reviewed  Clinical lab tests: ordered and reviewed  Tests in the radiology section of CPT®: ordered and reviewed  Decide to obtain previous medical records or to obtain history from someone other than the patient: yes    Patient Progress  Patient progress: stable      Final diagnoses:   End stage renal failure on dialysis (CMS/[facility])   Hypervolemia, unspecified hypervolemia type   Dyspnea, unspecified type          Daniela Esteban, APRN  12/07/19 0836

## 2019-12-06 NOTE — CONSULTS
Nephrology ([facility]) Consult Note      Patient:  Mini Gentile  YOB: 1960  Date of Service: 12/6/2019  MRN: [id number]   Acct: [id number]   Primary Care Physician: Ivan Kelly MD  Advance Directive:   Code Status and Medical Interventions:   Ordered at: 12/06/19 1408     Level Of Support Discussed With:    Patient     Code Status:    CPR     Medical Interventions (Level of Support Prior to Arrest):    Full     Admit Date: 12/6/2019       Hospital Day: 0  Referring Provider: Fuentes Messina MD      Patient Seen, Chart, Consults, Notes, Labs, Radiology studies reviewed.        Subjective:  Mini Gentile is a 59 y.o. female  whom we were consulted for end stage renal disease management. Patient has previously been on chronic maintenance hemodialysis at [facility] dialysis unit. She has decided then to quit in September 2019.  She has benign hypertension and type 2 diabetes complicated with diabetic nephropathy.  She has an arteriovenous fistula in her left upper extremity.  Patient presented to the emergency room today complaining of increasing shortness of breath and malaise.  She was agreeing to restart dialysis.  Her bun was 123 and her serum creatinine was 14.1. She denies dysuria, fever, nausea, vomiting and diarrhea.     Allergies:  Bupropion; Chantix [varenicline]; Sulfa antibiotics; Azithromycin; Levofloxacin; and Penicillins    Home Meds:    (Not in a hospital admission)    Medicines:  Current Facility-Administered Medications   Medication Dose Route Frequency Provider Last Rate Last Dose   • acetaminophen (TYLENOL) tablet 650 mg  650 mg Oral Q4H PRN Fuentes Messina MD        Or   • acetaminophen (TYLENOL) 160 MG/5ML solution 650 mg  650 mg Oral Q4H PRN Fuentes Messina MD        Or   • acetaminophen (TYLENOL) suppository 650 mg  650 mg Rectal Q4H PRN Fuentes Messina MD       • heparin (porcine) 5000 UNIT/ML injection 5,000  Units  5,000 Units Subcutaneous Q8H Fuentes Messina MD       • ondansetron (ZOFRAN) tablet 4 mg  4 mg Oral Q6H PRN Fuentes Messina MD        Or   • ondansetron (ZOFRAN) injection 4 mg  4 mg Intravenous Q6H PRN Fuentes Messina MD       • sodium chloride 0.9 % flush 10 mL  10 mL Intravenous PRN Daniela Esteban APRN       • sodium chloride 0.9 % flush 10 mL  10 mL Intravenous Q12H Fuentes Messina MD       • sodium chloride 0.9 % flush 10 mL  10 mL Intravenous PRN Fuentes Messina MD         Current Outpatient Medications   Medication Sig Dispense Refill   • aspirin 81 MG EC tablet Take 81 mg by mouth Daily.     • benzonatate (TESSALON) 100 MG capsule Take 1 capsule by mouth 3 (Three) Times a Day As Needed for Cough. 20 capsule 0   • brimonidine (ALPHAGAN) 0.2 % ophthalmic solution Administer 1 drop into the left eye 3 (Three) Times a Day.     • calcitriol (ROCALTROL) 0.25 MCG capsule Take 0.25 mcg by mouth Take As Directed. Only given on dialysis days (Tuesday, Thursday, Saturday)     • cetirizine (zyrTEC) 10 MG tablet Take 10 mg by mouth At Night As Needed (allergies).     • cholecalciferol (VITAMIN D3) 1000 units tablet Take 1,000 Units by mouth Daily.     • dorzolamide (TRUSOPT) 2 % ophthalmic solution Administer 1 drop into the left eye 2 (Two) Times a Day.     • famotidine (PEPCID) 20 MG tablet Take 20 mg by mouth 2 (Two) Times a Day.     • fluticasone (FLONASE) 50 MCG/ACT nasal spray 1 spray into the nostril(s) as directed by provider 2 (Two) Times a Day.     • guaiFENesin (MUCINEX) 600 MG 12 hr tablet Take 2 tablets by mouth Every 12 (Twelve) Hours.     • homatropine 5 % ophthalmic solution Administer 1 drop into the left eye Daily.     • insulin glargine (LANTUS) 100 UNIT/ML injection Inject 10 Units under the skin Every Night.     • lidocaine-prilocaine (EMLA) 2.5-2.5 % cream Apply 1 application topically to the appropriate area as directed Take As Directed. Apply to access area 30  minutes prior to dialysis.      • losartan (COZAAR) 25 MG tablet Take 25 mg by mouth Daily.     • midodrine (PROAMATINE) 5 MG tablet Take 5 mg by mouth Take As Directed. as needed for low blood pressure during dialysis.     • Netarsudil Dimesylate (RHOPRESSA) 0.02 % solution Administer 1 drop into the left eye Every Night.     • pravastatin (PRAVACHOL) 10 MG tablet Take 10 mg by mouth Every Night.     • predniSONE (DELTASONE) 20 MG tablet  Take 4 tablets by mouth daily for 3 days, 2 tablets daily for 3 days, 1 tablet daily for 3 days then stop 11 tablet 0   • sertraline (ZOLOFT) 25 MG tablet Take 25 mg by mouth Every Night.     • sevelamer (RENAGEL) 800 MG tablet Take 1,600 mg by mouth 3 (Three) Times a Day With Meals.     • timolol (TIMOPTIC) 0.5 % ophthalmic solution Administer 1 drop into the left eye Every Morning.     • travoprost, BAK free, (TRAVATAN Z) 0.004 % solution ophthalmic solution Administer 1 drop into the left eye Every Night.         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    • Chronic otitis media    • Diabetes (CMS/HCC)    • Eustachian tube dysfunction    • Glaucoma    • HTN (hypertension)    • Kidney failure     on dialysis   • Liver disorder    • Mucoid otitis media        Past Surgical History:  Past Surgical History:   Procedure Laterality Date   • ARTERIOVENOUS FISTULA     • CATARACT EXTRACTION WITH INTRAOCULAR LENS IMPLANT     •  SECTION     • CHOLECYSTECTOMY     • MYRINGOTOMY W/ TUBES Bilateral    • MYRINGOTOMY W/ TUBES Right    • TUBAL ABDOMINAL LIGATION         Family History  Family History   Problem Relation Age of Onset   • Heart disease Mother        Social History  Social History     Socioeconomic History   • Marital status:      Spouse name: Not on file   • Number of children: Not on file   • Years of education: Not on file   • Highest education level: Not on file   Tobacco Use   • Smoking  "status: Former Smoker     Packs/day: 2.00     Types: Cigarettes     Last attempt to quit: 10/27/2017     Years since quittin.1   • Smokeless tobacco: Never Used   Substance and Sexual Activity   • Alcohol use: No   • Drug use: Yes     Frequency: 1.0 times per week     Types: Marijuana     Comment: pt says she smokes marijuana once per week   • Sexual activity: Defer         Review of Systems:  History obtained from chart review and the patient  General ROS: No fever or chills  Respiratory ROS: No cough, +shortness of breath,- wheezing  Cardiovascular ROS: no chest pain but dyspnea on exertion  Gastrointestinal ROS: No abdominal pain or melena  Genito-Urinary ROS: No dysuria or hematuria  14 point ROS reviewed with the patient and negative except as noted above and in the HPI unless unable to obtain.    Objective:  /71   Pulse 95   Temp 97.8 °F (36.6 °C) (Oral)   Resp 16   Ht 149.9 cm (59\")   Wt 61.2 kg (135 lb)   SpO2 96%   BMI 27.27 kg/m²   No intake or output data in the 24 hours ending 19 1536  General: awake/alert    Head: atraumatic, normocephalic  Neck: no palpable masses, mild bilateral jugular venous distension  Chest: Scattered fine bilateral rales, with decreased breath sounds at the bases  CVS: regular rate and rhythm, soft systolic ejection murmur  Abdominal: soft, nontender, normal bowel sounds  Extremities: No cyanosis but trace leg edema  Skin: warm and dry without rash  Neuro: No focal motor deficits  Musculoskeletal: No obvious joint effusions    Labs:  Lab Results (last 72 hours)     Procedure Component Value Units Date/Time    POC Occult Blood Stool [[id number]]  (Normal) Collected:  19 1250    Specimen:  Stool Updated:  19 1330     Fecal Occult Blood Negative     Lot Number [id number]     Expiration Date 2020     DEVELOPER LOT NUMBER [id number]     DEVELOPER EXPIRATION DATE 2020     Positive Control Positive     Negative Control Negative    BNP [[id number]]  " (Abnormal) Collected:  12/06/19 1144    Specimen:  Blood Updated:  12/06/19 1301     proBNP 40,279.0 pg/mL     Narrative:       Among patients with dyspnea, NT-proBNP is highly sensitive for the detection of acute congestive heart failure. In addition NT-proBNP of <300 pg/ml effectively rules out acute congestive heart failure with 99% negative predictive value.    Troponin [[id number]]  (Abnormal) Collected:  12/06/19 1144    Specimen:  Blood Updated:  12/06/19 1255     Troponin T 0.047 ng/mL     Narrative:       Troponin T Reference Range:  <= 0.03 ng/mL-   Negative for AMI  >0.03 ng/mL-     Abnormal for myocardial necrosis.  Clinicians would have to utilize clinical acumen, EKG, Troponin and serial changes to determine if it is an Acute Myocardial Infarction or myocardial injury due to an underlying chronic condition.     [place] Draw [[id number]] Collected:  12/06/19 1144    Specimen:  Blood Updated:  12/06/19 1245    Narrative:       The following orders were created for panel order [place] Draw.  Procedure                               Abnormality         Status                     ---------                               -----------         ------                     Light Blue Top[[id number]]                                   Final result               Green Top (Gel)[[id number]]                                  Final result               Lavender Top[[id number]]                                     Final result               Red Top[[id number]]                                          Final result                 Please view results for these tests on the individual orders.    Light Blue Top [[id number]] Collected:  12/06/19 1144    Specimen:  Blood Updated:  12/06/19 1245     Extra Tube hold for add-on     Comment: Auto resulted       Green Top (Gel) [[id number]] Collected:  12/06/19 1144    Specimen:  Blood Updated:  12/06/19 1245     Extra Tube Hold for add-ons.     Comment: Auto resulted.       Lavender Top  [[id number]] Collected:  12/06/19 1144    Specimen:  Blood Updated:  12/06/19 1245     Extra Tube hold for add-on     Comment: Auto resulted       Red Top [[id number]] Collected:  12/06/19 1144    Specimen:  Blood Updated:  12/06/19 1245     Extra Tube Hold for add-ons.     Comment: Auto resulted.       Protime-INR [[id number]]  (Abnormal) Collected:  12/06/19 1144    Specimen:  Blood Updated:  12/06/19 1241     Protime 15.5 Seconds      INR 1.19    Comprehensive Metabolic Panel [[id number]]  (Abnormal) Collected:  12/06/19 1144    Specimen:  Blood Updated:  12/06/19 1229     Glucose 72 mg/dL       mg/dL      Creatinine 14.18 mg/dL      Sodium 147 mmol/L      Potassium 5.3 mmol/L      Chloride 110 mmol/L      CO2 8.0 mmol/L      Calcium 9.3 mg/dL      Total Protein 7.6 g/dL      Albumin 4.40 g/dL      ALT (SGPT) 9 U/L      AST (SGOT) 14 U/L      Alkaline Phosphatase 66 U/L      Total Bilirubin 0.3 mg/dL      eGFR Non African Amer 3 mL/min/1.73      Comment: <15 Indicative of kidney failure.        eGFR   Amer --     Comment: <15 Indicative of kidney failure.        Globulin 3.2 gm/dL      A/G Ratio 1.4 g/dL      BUN/Creatinine Ratio 8.7     Anion Gap 29.0 mmol/L     Narrative:       GFR Normal >60  Chronic Kidney Disease <60  Kidney Failure <15    CBC & Differential [[id number]] Collected:  12/06/19 1144    Specimen:  Blood Updated:  12/06/19 1159    Narrative:       The following orders were created for panel order CBC & Differential.  Procedure                               Abnormality         Status                     ---------                               -----------         ------                     CBC Auto Differential[[id number]]        Abnormal            Final result                 Please view results for these tests on the individual orders.    CBC Auto Differential [[id number]]  (Abnormal) Collected:  12/06/19 1144    Specimen:  Blood Updated:  12/06/19 1159     WBC 4.94 10*3/mm3      RBC 2.47  10*6/mm3      Hemoglobin 7.9 g/dL      Hematocrit 23.8 %      MCV 96.4 fL      MCH 32.0 pg      MCHC 33.2 g/dL      RDW 14.5 %      RDW-SD 51.3 fl      MPV 9.2 fL      Platelets 278 10*3/mm3      Neutrophil % 67.1 %      Lymphocyte % 21.5 %      Monocyte % 5.9 %      Eosinophil % 4.5 %      Basophil % 0.6 %      Immature Grans % 0.4 %      Neutrophils, Absolute 3.32 10*3/mm3      Lymphocytes, Absolute 1.06 10*3/mm3      Monocytes, Absolute 0.29 10*3/mm3      Eosinophils, Absolute 0.22 10*3/mm3      Basophils, Absolute 0.03 10*3/mm3      Immature Grans, Absolute 0.02 10*3/mm3      nRBC 0.4 /100 WBC           Radiology:   Imaging Results (Last 72 Hours)     Procedure Component Value Units Date/Time    XR Chest 1 View [[id number]] Collected:  12/06/19 1332     Updated:  12/06/19 1339    Narrative:       XR CHEST 1 VW- 12/6/2019 1:13 PM CST     HISTORY: dyspnea       COMPARISON: Chest exam dated 09/03/2019.     FINDINGS:   Fairly extensive bibasilar lung consolidation with obscuration of both  hemidiaphragms and what appears to be a trace layering left pleural  effusion. Additional mild patchy infiltrate in the paramedian right  upper lobe. Bilateral diffuse coarsened interstitial markings with what  appears to be subpleural Kerley B lines in the right base. Underlying  mild cardiomegaly. No pneumothorax. Incidentally noted vascular stent in  the region of the left brachiocephalic vein. No acute bony abnormality.        Impression:       1. Bilateral interstitial and basilar consolidative infiltrates. There  appear to be subpleural Kerley B lines in the right base, suggesting a  volume overload with interstitial and casey pulmonary edema. This seems  less likely infectious.        This report was finalized on 12/06/2019 13:35 by Dr Jhonatan Berry, .          Culture:  No components found for: WOUNDCUL, 3  No components found for: CSFCUL, 3  No components found for: BC, 3  No components found for: URINECUL,  3      Assessment   -End-stage renal disease  -Type 2 diabetes with renal disease  -Benign hypertension  -Noncompliance  -Metabolic acidosis  -Hyperkalemia  -Anemia of chronic kidney disease      Plan:  Patient will receive emergent dialysis today and will provide more dialysis in a.m.  We will follow-up labs.  Check iron level, and PTH.  Patient is agreeing to restart her dialysis and would like to go back to the [facility] unit.      Thank you for the consult, we appreciate the opportunity to provide care to your patients.  Feel free to contact me if I can be of any further assistance.      Ten Boyd MD  12/6/2019  3:36 PM

## 2019-12-07 ENCOUNTER — APPOINTMENT (OUTPATIENT)
Dept: CARDIOLOGY | Facility: HOSPITAL | Age: 59
End: 2019-12-07

## 2019-12-07 LAB
ALBUMIN SERPL-MCNC: 3.9 G/DL (ref 3.5–5.2)
ANION GAP SERPL CALCULATED.3IONS-SCNC: 21 MMOL/L (ref 5–15)
BUN BLD-MCNC: 35 MG/DL (ref 8–23)
BUN/CREAT SERPL: 5.4 (ref 7–25)
CALCIUM SPEC-SCNC: 8.9 MG/DL (ref 8.6–10.5)
CHLORIDE SERPL-SCNC: 94 MMOL/L (ref 98–107)
CO2 SERPL-SCNC: 23 MMOL/L (ref 22–29)
CREAT BLD-MCNC: 6.54 MG/DL (ref 0.57–1)
GFR SERPL CREATININE-BSD FRML MDRD: 6 ML/MIN/1.73
GFR SERPL CREATININE-BSD FRML MDRD: ABNORMAL ML/MIN/{1.73_M2}
GLUCOSE BLD-MCNC: 116 MG/DL (ref 65–99)
HBV SURFACE AB SER RIA-ACNC: REACTIVE
IRON 24H UR-MRATE: 61 MCG/DL (ref 37–145)
IRON SATN MFR SERPL: 27 % (ref 20–50)
PHOSPHATE SERPL-MCNC: 5.2 MG/DL (ref 2.5–4.5)
POTASSIUM BLD-SCNC: 3.2 MMOL/L (ref 3.5–5.2)
SODIUM BLD-SCNC: 138 MMOL/L (ref 136–145)
TIBC SERPL-MCNC: 228 MCG/DL (ref 298–536)
TRANSFERRIN SERPL-MCNC: 153 MG/DL (ref 200–360)

## 2019-12-07 PROCEDURE — 94760 N-INVAS EAR/PLS OXIMETRY 1: CPT

## 2019-12-07 PROCEDURE — 25010000002 HEPARIN (PORCINE) PER 1000 UNITS: Performed by: INTERNAL MEDICINE

## 2019-12-07 PROCEDURE — 80069 RENAL FUNCTION PANEL: CPT | Performed by: INTERNAL MEDICINE

## 2019-12-07 PROCEDURE — 25010000002 ONDANSETRON PER 1 MG: Performed by: INTERNAL MEDICINE

## 2019-12-07 PROCEDURE — 83540 ASSAY OF IRON: CPT | Performed by: INTERNAL MEDICINE

## 2019-12-07 PROCEDURE — 84466 ASSAY OF TRANSFERRIN: CPT | Performed by: INTERNAL MEDICINE

## 2019-12-07 PROCEDURE — 94799 UNLISTED PULMONARY SVC/PX: CPT

## 2019-12-07 RX ORDER — POTASSIUM CHLORIDE 750 MG/1
40 CAPSULE, EXTENDED RELEASE ORAL ONCE
Status: COMPLETED | OUTPATIENT
Start: 2019-12-07 | End: 2019-12-07

## 2019-12-07 RX ADMIN — GABAPENTIN 100 MG: 100 CAPSULE ORAL at 20:51

## 2019-12-07 RX ADMIN — HEPARIN SODIUM 5000 UNITS: 5000 INJECTION, SOLUTION INTRAVENOUS; SUBCUTANEOUS at 20:53

## 2019-12-07 RX ADMIN — POTASSIUM CHLORIDE 40 MEQ: 750 CAPSULE, EXTENDED RELEASE ORAL at 14:17

## 2019-12-07 RX ADMIN — ACETAMINOPHEN 650 MG: 325 TABLET, FILM COATED ORAL at 05:46

## 2019-12-07 RX ADMIN — GABAPENTIN 100 MG: 100 CAPSULE ORAL at 08:03

## 2019-12-07 RX ADMIN — HEPARIN SODIUM 5000 UNITS: 5000 INJECTION, SOLUTION INTRAVENOUS; SUBCUTANEOUS at 14:17

## 2019-12-07 RX ADMIN — ACETAMINOPHEN 650 MG: 325 TABLET, FILM COATED ORAL at 14:23

## 2019-12-07 RX ADMIN — HEPARIN SODIUM 5000 UNITS: 5000 INJECTION, SOLUTION INTRAVENOUS; SUBCUTANEOUS at 05:41

## 2019-12-07 RX ADMIN — SODIUM CHLORIDE, PRESERVATIVE FREE 10 ML: 5 INJECTION INTRAVENOUS at 08:03

## 2019-12-07 RX ADMIN — ONDANSETRON HYDROCHLORIDE 4 MG: 2 SOLUTION INTRAMUSCULAR; INTRAVENOUS at 16:40

## 2019-12-07 RX ADMIN — SODIUM CHLORIDE, PRESERVATIVE FREE 10 ML: 5 INJECTION INTRAVENOUS at 20:52

## 2019-12-07 NOTE — PROGRESS NOTES
[facility]  INPATIENT PROGRESS NOTE    Patient Name: Mini Gentile  Date of Admission: 12/6/2019  Today's Date: 12/07/19  Length of Stay: 1  Primary Care Physician: Ivan Kelly MD    Subjective   Chief Complaint: weakness  HPI     Patient was seen and examined at bedside.  Patient overall doing well.  Patient is more alert and oriented, and does not appear confused today.  She is already had dialysis this morning and did very well.  Patient denies any nausea vomiting diarrhea.  Patient denies any fever or chills.  Patient will likely need at least 1 more dialysis session and then likely can be discharged home.  We will have physical therapy and occupational therapy work with her due to her weakness.        Review of Systems   Constitutional: Negative for chills and fever.   Respiratory: Negative for cough and shortness of breath.    Cardiovascular: Negative for chest pain and palpitations.   Gastrointestinal: Negative for abdominal distention, abdominal pain, diarrhea and nausea.        All pertinent negatives and positives are as above. All other systems have been reviewed and are negative unless otherwise stated.     Objective    Temp:  [97.4 °F (36.3 °C)-98.3 °F (36.8 °C)] 98.3 °F (36.8 °C)  Heart Rate:  [83-95] 83  Resp:  [16-24] 16  BP: (134-180)/(46-99) 178/72  Physical Exam   Constitutional: She is oriented to person, place, and time. No distress.   HENT:   Head: Normocephalic and atraumatic.   Eyes: Conjunctivae are normal. No scleral icterus.   Neck: Neck supple. No JVD present.   Cardiovascular: Normal rate, regular rhythm and intact distal pulses.   Murmur heard.  Pulmonary/Chest: Effort normal and breath sounds normal. No stridor. No respiratory distress. She has no wheezes.   Abdominal: Soft. Bowel sounds are normal. She exhibits no distension and no mass. There is no tenderness. There is no guarding.   Musculoskeletal: She  exhibits edema.   Neurological: She is alert and oriented to person, place, and time.   Skin: Skin is warm and dry. She is not diaphoretic. No erythema.   Psychiatric: She has a normal mood and affect. Her behavior is normal.   Nursing note and vitals reviewed.          Results Review:  I have reviewed the labs, radiology results, and diagnostic studies.    Laboratory Data:   Results from last 7 days   Lab Units 12/06/19  1144   WBC 10*3/mm3 4.94   HEMOGLOBIN g/dL 7.9*   HEMATOCRIT % 23.8*   PLATELETS 10*3/mm3 278        Results from last 7 days   Lab Units 12/07/19  0345 12/06/19  1144   SODIUM mmol/L 138 147*   POTASSIUM mmol/L 3.2* 5.3*   CHLORIDE mmol/L 94* 110*   CO2 mmol/L 23.0 8.0*   BUN mg/dL 35* 123*   CREATININE mg/dL 6.54* 14.18*   CALCIUM mg/dL 8.9 9.3   BILIRUBIN mg/dL  --  0.3   ALK PHOS U/L  --  66   ALT (SGPT) U/L  --  9   AST (SGOT) U/L  --  14   GLUCOSE mg/dL 116* 72       Culture Data:   No results found for: BLOODCX, URINECX, WOUNDCX, MRSACX, RESPCX, STOOLCX    Radiology Data:   Imaging Results (Last 24 Hours)     Procedure Component Value Units Date/Time    XR Chest 1 View [[id number]] Collected:  12/06/19 1332     Updated:  12/06/19 1339    Narrative:       XR CHEST 1 VW- 12/6/2019 1:13 PM CST     HISTORY: dyspnea       COMPARISON: Chest exam dated 09/03/2019.     FINDINGS:   Fairly extensive bibasilar lung consolidation with obscuration of both  hemidiaphragms and what appears to be a trace layering left pleural  effusion. Additional mild patchy infiltrate in the paramedian right  upper lobe. Bilateral diffuse coarsened interstitial markings with what  appears to be subpleural Kerley B lines in the right base. Underlying  mild cardiomegaly. No pneumothorax. Incidentally noted vascular stent in  the region of the left brachiocephalic vein. No acute bony abnormality.        Impression:       1. Bilateral interstitial and basilar consolidative infiltrates. There  appear to be subpleural Kerley B  "lines in the right base, suggesting a  volume overload with interstitial and casey pulmonary edema. This seems  less likely infectious.        This report was finalized on 12/06/2019 13:35 by Dr Jhonatan Berry, .          I have reviewed the patient's current medications.     Assessment/Plan     Active Hospital Problems    Diagnosis   • Anemia due to chronic kidney disease, on chronic dialysis (CMS/[facility])   • Acute pulmonary edema (CMS/[facility])   • Non-compliance with renal dialysis (CMS/[facility])   • Elevated troponin due to ESRD    • Hyperkalemia   • Encephalopathy, metabolic, due to uremia   • High anion gap metabolic acidosis due to uremia   • Type 2 diabetes mellitus, with long-term current use of insulin (CMS/[facility])   • End stage renal failure on dialysis (CMS/[facility])   • Diabetic nephropathy (CMS/[facility])   • HTN (hypertension)       Plan:  1.  Received dialysis 12/6, 12/7 - Continue dialysis per nephrology  2.  Edema seems to resolved  3.  Counseled on compliance  4.  Resume home medicatoins as appropriate  5.  Heparin DVT PPx  6.  Echo pending  7.   consult for \"home dialysis\" per patient request  8.  AM renal function   9.  Encephalopathy has seemed to have resolved  10.  PT/OT               Discharge Planning: I expect the patient to be discharged to home in 1-2 days.    Fuentes Messina MD   12/07/19   1:05 PM  "

## 2019-12-07 NOTE — H&P
"    [facility] Medicine Services  HISTORY AND PHYSICAL    Date of Admission: 2019  Primary Care Physician: Ivan Kelly MD    Subjective     Chief Complaint: shortness of breath    History of Present Illness    Mrs. Gentile is a 58 yo F with a past medical history of ESRD due to DM and hypertensive kidney disease.  Patient was supposed to be on dialysis three times weekly but has not had dialysis since late 2019.  Patient still makes urine.  Patient has been having worsening exertional dyspnea, orthopnea, and PND.  Patient indicates that she stopped dialysis due to disagreements with the dialysis \"people\"    Patient was seen and evaluated while on dialysis.    Review of Systems   Constitutional: Positive for activity change, fatigue and unexpected weight change. Negative for chills and fever.   Respiratory: Positive for shortness of breath. Negative for cough.    Cardiovascular: Positive for leg swelling. Negative for chest pain and palpitations.   Gastrointestinal: Negative for abdominal distention, abdominal pain, diarrhea, nausea and vomiting.   Genitourinary: Positive for decreased urine volume.   All other systems reviewed and are negative.       Otherwise complete ROS reviewed and negative except as mentioned in the HPI.    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 Her smoking use included cigarettes. She smoked 2.00 packs per day. She has never used smokeless tobacco. She reports that she uses drugs. Drug: Marijuana. Frequency: 1.00 time per week. She reports that she does not drink alcohol.    Family History: family history includes Heart disease in her mother.       Allergies:  Allergies   Allergen Reactions   • Bupropion Nausea Only   • Chantix [Varenicline]    • Sulfa Antibiotics    • Azithromycin Rash   • Levofloxacin Rash   • Penicillins Rash     Medications:  Prior to Admission medications    Medication Sig Start Date End Date Taking? Authorizing Provider   aspirin 81 MG EC tablet Take 81 mg by mouth Daily.    Steve Warren MD   benzonatate (TESSALON) 100 MG capsule Take 1 capsule by mouth 3 (Three) Times a Day As Needed for Cough. 9/5/19   Guru Zacarias APRN   brimonidine (ALPHAGAN) 0.2 % ophthalmic solution Administer 1 drop into the left eye 3 (Three) Times a Day.    Steve Warren MD   calcitriol (ROCALTROL) 0.25 MCG capsule Take 0.25 mcg by mouth Take As Directed. Only given on dialysis days (Tuesday, Thursday, Saturday)    Steve Warren MD   cetirizine (zyrTEC) 10 MG tablet Take 10 mg by mouth At Night As Needed (allergies).    Steve Warren MD   cholecalciferol (VITAMIN D3) 1000 units tablet Take 1,000 Units by mouth Daily.    Steve Warren MD   dorzolamide (TRUSOPT) 2 % ophthalmic solution Administer 1 drop into the left eye 2 (Two) Times a Day.    Steve Warren MD   famotidine (PEPCID) 20 MG tablet Take 20 mg by mouth 2 (Two) Times a Day.    Steve Warren MD   fluticasone (FLONASE) 50 MCG/ACT nasal spray 1 spray into the nostril(s) as directed by provider 2 (Two) Times a Day.    Steve Warren MD   guaiFENesin (MUCINEX) 600 MG 12 hr tablet Take 2 tablets by mouth Every 12 (Twelve) Hours. 9/5/19   Guru Zacarias APRN   homatropine 5  "% ophthalmic solution Administer 1 drop into the left eye Daily.    Steve Warren MD   insulin glargine (LANTUS) 100 UNIT/ML injection Inject 10 Units under the skin Every Night.    Steve Warren MD   lidocaine-prilocaine (EMLA) 2.5-2.5 % cream Apply 1 application topically to the appropriate area as directed Take As Directed. Apply to access area 30 minutes prior to dialysis.     Steve Warren MD   losartan (COZAAR) 25 MG tablet Take 25 mg by mouth Daily.    Steve Warren MD   midodrine (PROAMATINE) 5 MG tablet Take 5 mg by mouth Take As Directed. as needed for low blood pressure during dialysis.    Steve Warren MD   Netarsudil Dimesylate (RHOPRESSA) 0.02 % solution Administer 1 drop into the left eye Every Night.    Steve Warren MD   pravastatin (PRAVACHOL) 10 MG tablet Take 10 mg by mouth Every Night.    Steve Warren MD   predniSONE (DELTASONE) 20 MG tablet  Take 4 tablets by mouth daily for 3 days, 2 tablets daily for 3 days, 1 tablet daily for 3 days then stop 9/5/19   Guru Zacarias APRN   sertraline (ZOLOFT) 25 MG tablet Take 25 mg by mouth Every Night.    Steve Warren MD   sevelamer (RENAGEL) 800 MG tablet Take 1,600 mg by mouth 3 (Three) Times a Day With Meals.    Steve Warren MD   timolol (TIMOPTIC) 0.5 % ophthalmic solution Administer 1 drop into the left eye Every Morning.    Steve Warren MD   travoprost, BAK free, (TRAVATAN Z) 0.004 % solution ophthalmic solution Administer 1 drop into the left eye Every Night.    Steve Warren MD     Objective     Vital Signs: /71   Pulse 95   Temp 97.8 °F (36.6 °C) (Oral)   Resp 16   Ht 149.9 cm (59\")   Wt 61.2 kg (135 lb)   SpO2 96%   BMI 27.27 kg/m²   Physical Exam   Constitutional: She is oriented to person, place, and time. No distress.   HENT:   Head: Normocephalic and atraumatic.   Eyes: Conjunctivae are normal.   Neck: Neck supple. No JVD present. "   Cardiovascular: Normal rate and regular rhythm.   Murmur heard.  Pulmonary/Chest: Effort normal. She has rales.   Abdominal: Soft. Bowel sounds are normal. She exhibits no distension. There is tenderness.   Musculoskeletal: She exhibits edema.   Neurological: She is alert and oriented to person, place, and time.   Skin: Skin is warm and dry. She is not diaphoretic. No erythema.   Psychiatric: She has a normal mood and affect. Her behavior is normal.   Nursing note and vitals reviewed.          Results Reviewed:  Lab Results (last 24 hours)     Procedure Component Value Units Date/Time    POC Occult Blood Stool [[id number]]  (Normal) Collected:  12/06/19 1250    Specimen:  Stool Updated:  12/06/19 1330     Fecal Occult Blood Negative     Lot Number [id number]     Expiration Date 05/31/2020     DEVELOPER LOT NUMBER [id number]     DEVELOPER EXPIRATION DATE 05/31/2020     Positive Control Positive     Negative Control Negative    BNP [[id number]]  (Abnormal) Collected:  12/06/19 1144    Specimen:  Blood Updated:  12/06/19 1301     proBNP 40,279.0 pg/mL     Narrative:       Among patients with dyspnea, NT-proBNP is highly sensitive for the detection of acute congestive heart failure. In addition NT-proBNP of <300 pg/ml effectively rules out acute congestive heart failure with 99% negative predictive value.    Troponin [[id number]]  (Abnormal) Collected:  12/06/19 1144    Specimen:  Blood Updated:  12/06/19 1255     Troponin T 0.047 ng/mL     Narrative:       Troponin T Reference Range:  <= 0.03 ng/mL-   Negative for AMI  >0.03 ng/mL-     Abnormal for myocardial necrosis.  Clinicians would have to utilize clinical acumen, EKG, Troponin and serial changes to determine if it is an Acute Myocardial Infarction or myocardial injury due to an underlying chronic condition.     [place] Draw [[id number]] Collected:  12/06/19 1144    Specimen:  Blood Updated:  12/06/19 1245    Narrative:       The following orders were created for panel order  [place] Draw.  Procedure                               Abnormality         Status                     ---------                               -----------         ------                     Light Blue Top[[id number]]                                   Final result               Green Top (Gel)[[id number]]                                  Final result               Lavender Top[[id number]]                                     Final result               Red Top[[id number]]                                          Final result                 Please view results for these tests on the individual orders.    Light Blue Top [[id number]] Collected:  12/06/19 1144    Specimen:  Blood Updated:  12/06/19 1245     Extra Tube hold for add-on     Comment: Auto resulted       Green Top (Gel) [[id number]] Collected:  12/06/19 1144    Specimen:  Blood Updated:  12/06/19 1245     Extra Tube Hold for add-ons.     Comment: Auto resulted.       Lavender Top [[id number]] Collected:  12/06/19 1144    Specimen:  Blood Updated:  12/06/19 1245     Extra Tube hold for add-on     Comment: Auto resulted       Red Top [[id number]] Collected:  12/06/19 1144    Specimen:  Blood Updated:  12/06/19 1245     Extra Tube Hold for add-ons.     Comment: Auto resulted.       Protime-INR [[id number]]  (Abnormal) Collected:  12/06/19 1144    Specimen:  Blood Updated:  12/06/19 1241     Protime 15.5 Seconds      INR 1.19    Comprehensive Metabolic Panel [[id number]]  (Abnormal) Collected:  12/06/19 1144    Specimen:  Blood Updated:  12/06/19 1229     Glucose 72 mg/dL       mg/dL      Creatinine 14.18 mg/dL      Sodium 147 mmol/L      Potassium 5.3 mmol/L      Chloride 110 mmol/L      CO2 8.0 mmol/L      Calcium 9.3 mg/dL      Total Protein 7.6 g/dL      Albumin 4.40 g/dL      ALT (SGPT) 9 U/L      AST (SGOT) 14 U/L      Alkaline Phosphatase 66 U/L      Total Bilirubin 0.3 mg/dL      eGFR Non African Amer 3 mL/min/1.73      Comment: <15 Indicative of kidney  failure.        eGFR   Amer --     Comment: <15 Indicative of kidney failure.        Globulin 3.2 gm/dL      A/G Ratio 1.4 g/dL      BUN/Creatinine Ratio 8.7     Anion Gap 29.0 mmol/L     Narrative:       GFR Normal >60  Chronic Kidney Disease <60  Kidney Failure <15    CBC & Differential [[id number]] Collected:  12/06/19 1144    Specimen:  Blood Updated:  12/06/19 1159    Narrative:       The following orders were created for panel order CBC & Differential.  Procedure                               Abnormality         Status                     ---------                               -----------         ------                     CBC Auto Differential[[id number]]        Abnormal            Final result                 Please view results for these tests on the individual orders.    CBC Auto Differential [[id number]]  (Abnormal) Collected:  12/06/19 1144    Specimen:  Blood Updated:  12/06/19 1159     WBC 4.94 10*3/mm3      RBC 2.47 10*6/mm3      Hemoglobin 7.9 g/dL      Hematocrit 23.8 %      MCV 96.4 fL      MCH 32.0 pg      MCHC 33.2 g/dL      RDW 14.5 %      RDW-SD 51.3 fl      MPV 9.2 fL      Platelets 278 10*3/mm3      Neutrophil % 67.1 %      Lymphocyte % 21.5 %      Monocyte % 5.9 %      Eosinophil % 4.5 %      Basophil % 0.6 %      Immature Grans % 0.4 %      Neutrophils, Absolute 3.32 10*3/mm3      Lymphocytes, Absolute 1.06 10*3/mm3      Monocytes, Absolute 0.29 10*3/mm3      Eosinophils, Absolute 0.22 10*3/mm3      Basophils, Absolute 0.03 10*3/mm3      Immature Grans, Absolute 0.02 10*3/mm3      nRBC 0.4 /100 WBC         Imaging Results (Last 24 Hours)     Procedure Component Value Units Date/Time    XR Chest 1 View [[id number]] Collected:  12/06/19 1332     Updated:  12/06/19 1339    Narrative:       XR CHEST 1 VW- 12/6/2019 1:13 PM CST     HISTORY: dyspnea       COMPARISON: Chest exam dated 09/03/2019.     FINDINGS:   Fairly extensive bibasilar lung consolidation with obscuration of  "both  hemidiaphragms and what appears to be a trace layering left pleural  effusion. Additional mild patchy infiltrate in the paramedian right  upper lobe. Bilateral diffuse coarsened interstitial markings with what  appears to be subpleural Kerley B lines in the right base. Underlying  mild cardiomegaly. No pneumothorax. Incidentally noted vascular stent in  the region of the left brachiocephalic vein. No acute bony abnormality.        Impression:       1. Bilateral interstitial and basilar consolidative infiltrates. There  appear to be subpleural Kerley B lines in the right base, suggesting a  volume overload with interstitial and casey pulmonary edema. This seems  less likely infectious.        This report was finalized on 12/06/2019 13:35 by Dr Jhonatan Berry, .        I have personally reviewed and interpreted the radiology studies and ECG obtained at time of admission.     Assessment / Plan     Assessment:   Active Hospital Problems    Diagnosis   • Anemia due to chronic kidney disease, on chronic dialysis (CMS/HCC)   • Acute pulmonary edema (CMS/HCC)   • Non-compliance with renal dialysis (CMS/HCC)   • Elevated troponin due to ESRD    • Hyperkalemia   • Encephalopathy, metabolic, due to uremia   • High anion gap metabolic acidosis due to uremia   • Type 2 diabetes mellitus, with long-term current use of insulin (CMS/HCC)   • End stage renal failure on dialysis (CMS/HCC)   • Diabetic nephropathy (CMS/HCC)   • HTN (hypertension)         Plan:   1.  Admit to medicine  2.  Consult nephrology  3.  Dialysis per nephrology  4.  Resume home medications as appropriate  5.  Heparin DVT PPx  6.  Echo   7.  SW consult for \"home dialysis\" per patient request  8.  AM renal function   9.  Resume home medications when appropriate, patient unable to reconcile medication as she is encephalopathic      Code Status: Full    Sonreymundo to make decisions if patient unable     I discussed the patient's findings and my " recommendations with the patient    Estimated length of stay 2-4    Fuentes Messina MD   12/06/19   6:01 PM

## 2019-12-07 NOTE — PLAN OF CARE
Problem: Fall Risk (Adult)  Goal: Absence of Fall  Outcome: Ongoing (interventions implemented as appropriate)  Flowsheets (Taken 12/7/2019 0330)  Absence of Fall: making progress toward outcome     Problem: Hemodialysis (Adult)  Goal: Signs and Symptoms of Listed Potential Problems Will be Absent, Minimized or Managed (Hemodialysis)  Outcome: Ongoing (interventions implemented as appropriate)  Flowsheets (Taken 12/7/2019 0330)  Problems Assessed (Hemodialysis): all  Problems Present (Hemodialysis): electrolyte imbalance;fluid imbalance     Problem: Kidney Disease, Chronic/End Stage Renal Disease (Adult)  Goal: Signs and Symptoms of Listed Potential Problems Will be Absent, Minimized or Managed (Kidney Disease, Chronic/End Stage Renal Disease)  Outcome: Ongoing (interventions implemented as appropriate)  Flowsheets (Taken 12/7/2019 0330)  Problems Assessed (Chronic Kidney Disease/ESRD): all  Problems Present (Chronic Kidney/ESRD): electrolyte imbalance;hypertension;situational response;fluid imbalance     Problem: Pain, Acute (Adult)  Goal: Acceptable Pain Control/Comfort Level  Outcome: Ongoing (interventions implemented as appropriate)  Flowsheets (Taken 12/7/2019 0330)  Acceptable Pain Control/Comfort Level: making progress toward outcome

## 2019-12-07 NOTE — PLAN OF CARE
Problem: Patient Care Overview  Goal: Plan of Care Review  Outcome: Ongoing (interventions implemented as appropriate)  Flowsheets  Taken 12/7/2019 1525  Progress: no change  Outcome Summary: Patient C/O lower extremity discomfort; PRN tylenol per orders. Gabapentin per orders. Dialysis completed. Patient is AXO today. Potassium replaced per orders. Safety Maintained. Will continue to monitor.  Taken 12/7/2019 0755  Plan of Care Reviewed With: patient  Goal: Individualization and Mutuality  Outcome: Ongoing (interventions implemented as appropriate)  Goal: Discharge Needs Assessment  Outcome: Ongoing (interventions implemented as appropriate)  Goal: Interprofessional Rounds/Family Conf  Outcome: Ongoing (interventions implemented as appropriate)     Problem: Fall Risk (Adult)  Goal: Absence of Fall  Outcome: Ongoing (interventions implemented as appropriate)     Problem: Hemodialysis (Adult)  Goal: Signs and Symptoms of Listed Potential Problems Will be Absent, Minimized or Managed (Hemodialysis)  Outcome: Ongoing (interventions implemented as appropriate)  Flowsheets (Taken 12/7/2019 1525)  Problems Assessed (Hemodialysis): all  Problems Present (Hemodialysis): electrolyte imbalance; fluid imbalance; situational response     Problem: Kidney Disease, Chronic/End Stage Renal Disease (Adult)  Goal: Signs and Symptoms of Listed Potential Problems Will be Absent, Minimized or Managed (Kidney Disease, Chronic/End Stage Renal Disease)  Outcome: Ongoing (interventions implemented as appropriate)  Flowsheets   Problems Assessed (Chronic Kidney Disease/ESRD): all  Problems Present (Chronic Kidney/ESRD): electrolyte imbalance;hypertension;situational response;fluid imbalance     Problem: Pain, Acute (Adult)  Goal: Acceptable Pain Control/Comfort Level  Outcome: Ongoing (interventions implemented as appropriate)  Flowsheets (Taken 12/7/2019 1525)  Acceptable Pain Control/Comfort Level: making progress toward outcome

## 2019-12-07 NOTE — PROGRESS NOTES
Continued Stay Note   [place]     Patient Name: Mini Gentile  MRN: [id number]  Today's Date: 12/7/2019    Admit Date: 12/6/2019    Discharge Plan     Row Name 12/07/19 1110       Plan    Plan Comments  SW received consult in regards to pt needing cab for discharge. Pt is in dialyisis at this time. SW attempted to call emergency contact and voicemail stated that phone had restrictions and could not answer phone. SW will complete assessment at another time.         Discharge Codes    No documentation.             Cindy Alberts

## 2019-12-07 NOTE — PROGRESS NOTES
" PROGRESS NOTE.      Patient:  Mini Gentile  YOB: 1960  Date of Service: 12/7/2019  MRN: [id number]   Acct: [id number]   Primary Care Physician: Ivan Kelly MD  Advance Directive:   Code Status and Medical Interventions:   Ordered at: 12/06/19 1408     Level Of Support Discussed With:    Patient     Code Status:    CPR     Medical Interventions (Level of Support Prior to Arrest):    Full     Admit Date: 12/6/2019       Hospital Day: 1  Referring Provider: Fuentes Messina MD      Patient Seen, Chart, Consults, Notes, Labs, Radiology studies reviewed.        Subjective:  Mini Gentile is a 59 y.o. female  whom we were consulted for end stage renal disease management. Patient has previously been on chronic maintenance hemodialysis at [facility] dialysis unit. She has decided then to quit in September 2019.  She has benign hypertension and type 2 diabetes complicated with diabetic nephropathy.  She has an arteriovenous fistula in her left upper extremity.  Patient presented to the emergency room today complaining of increasing shortness of breath and malaise.  She was agreeing to restart dialysis.  Her bun was 123 and her serum creatinine was 14.1. She denies dysuria, fever, nausea, vomiting and diarrhea.   Today, she is status post dialysis.  Admitted being nauseated.    Review of Systems:  History obtained from chart review and the patient  General ROS: No fever or chills  Respiratory ROS: No cough, shortness of breath, wheezing  Cardiovascular ROS: no chest pain or dyspnea on exertion  Gastrointestinal ROS: No abdominal pain or melena  Genito-Urinary ROS: No dysuria or hematuria  Musculoskeletal: negative  Skin: negative    Objective:  /72 (BP Location: Right arm, Patient Position: Lying)   Pulse 83   Temp 98.3 °F (36.8 °C) (Oral)   Resp 16   Ht 149.9 cm (59\")   Wt 61.2 kg (135 lb)   SpO2 96%   BMI 27.27 kg/m²     Intake/Output Summary (Last 24 hours) at " 12/7/2019 1348  Last data filed at 12/7/2019 1238  Gross per 24 hour   Intake 0 ml   Output 1700 ml   Net -1700 ml       Physical examination:  General: awake/alert   Chest:  clear to auscultation bilaterally without respiratory distress  CVS: regular rate and rhythm  Abdominal: soft, nontender, normal bowel sounds  Extremities: no cyanosis or edema  Skin: warm and dry without rash  Neuro: No focal motor deficits    Labs:  Lab Results (last 24 hours)     Procedure Component Value Units Date/Time    Hepatitis B Surface Antibody [[id number]]  (Abnormal) Collected:  12/06/19 1942    Specimen:  Blood Updated:  12/07/19 1207     Hep B S Ab Reactive    Renal Function Panel [[id number]]  (Abnormal) Collected:  12/07/19 0345    Specimen:  Blood Updated:  12/07/19 0450     Glucose 116 mg/dL      BUN 35 mg/dL      Creatinine 6.54 mg/dL      Sodium 138 mmol/L      Potassium 3.2 mmol/L      Chloride 94 mmol/L      CO2 23.0 mmol/L      Calcium 8.9 mg/dL      Albumin 3.90 g/dL      Phosphorus 5.2 mg/dL      Anion Gap 21.0 mmol/L      BUN/Creatinine Ratio 5.4     eGFR Non African Amer 6 mL/min/1.73      Comment: <15 Indicative of kidney failure.        eGFR   Amer --     Comment: <15 Indicative of kidney failure.       Narrative:       GFR Normal >60  Chronic Kidney Disease <60  Kidney Failure <15      Iron Profile [[id number]]  (Abnormal) Collected:  12/07/19 0345    Specimen:  Blood Updated:  12/07/19 0443     Iron 61 mcg/dL      Iron Saturation 27 %      Transferrin 153 mg/dL      TIBC 228 mcg/dL     Hepatitis Panel, Acute [[id number]]  (Normal) Collected:  12/06/19 1942    Specimen:  Blood Updated:  12/06/19 2019     Hepatitis B Surface Ag Non-Reactive     Hep A IgM Non-Reactive     Hep B C IgM Non-Reactive     Hepatitis C Ab Non-Reactive    PTH, Intact [[id number]]  (Abnormal) Collected:  12/06/19 1942    Specimen:  Blood Updated:  12/06/19 2008     PTH, Intact 359.5 pg/mL     POC Glucose Once [[id number]]  (Normal)  Collected:  12/06/19 1856    Specimen:  Blood Updated:  12/06/19 1907     Glucose 78 mg/dL      Comment: : [id number] Yaya Bryan ID: [id number]             Radiology:   Imaging Results (Last 24 Hours)     ** No results found for the last 24 hours. **              Assessment   -End-stage renal disease  -Type 2 diabetes with renal disease  -Benign hypertension  -Noncompliance  -Metabolic acidosis  -Hyperkalemia  -Anemia of chronic kidney disease      Plan:  Next dialysis is due on 12/9.  Follow-up labs.  Arrange outpatient dialysis placement at [facility].      Tne Boyd MD  12/7/2019  1:48 PM

## 2019-12-07 NOTE — PLAN OF CARE
Problem: Patient Care Overview  Goal: Plan of Care Review  Flowsheets (Taken 12/7/2019 0330)  Progress: no change  Plan of Care Reviewed With: patient  Note:   Patient arrived to room 462 shortly after shift change from ED via stretcher. Patient confused and oriented to person and place only, very unsteady, and C/O bilateral pain in lower extremities 10/10. MD called due to only having Tylenol on MAR. Orders received for Neurontin. Given with relief of pain in legs. Medicated for bilateral  leg pain a second time with Tylenol for pain 4/10. Patient stable on RA. Dialysis on admission and plans for dialysis again today. Safety maintained.

## 2019-12-08 ENCOUNTER — APPOINTMENT (OUTPATIENT)
Dept: CARDIOLOGY | Facility: HOSPITAL | Age: 59
End: 2019-12-08

## 2019-12-08 LAB
ALBUMIN SERPL-MCNC: 3.9 G/DL (ref 3.5–5.2)
ANION GAP SERPL CALCULATED.3IONS-SCNC: 17 MMOL/L (ref 5–15)
BH CV ECHO MEAS - AO MAX PG (FULL): 7.4 MMHG
BH CV ECHO MEAS - AO MAX PG: 13.1 MMHG
BH CV ECHO MEAS - AO MEAN PG (FULL): 5 MMHG
BH CV ECHO MEAS - AO MEAN PG: 8 MMHG
BH CV ECHO MEAS - AO ROOT AREA (BSA CORRECTED): 1.8
BH CV ECHO MEAS - AO ROOT AREA: 6.2 CM^2
BH CV ECHO MEAS - AO ROOT DIAM: 2.8 CM
BH CV ECHO MEAS - AO V2 MAX: 181 CM/SEC
BH CV ECHO MEAS - AO V2 MEAN: 132.5 CM/SEC
BH CV ECHO MEAS - AO V2 VTI: 47.1 CM
BH CV ECHO MEAS - AVA(I,A): 1.9 CM^2
BH CV ECHO MEAS - AVA(I,D): 1.9 CM^2
BH CV ECHO MEAS - AVA(V,A): 2.1 CM^2
BH CV ECHO MEAS - AVA(V,D): 2.1 CM^2
BH CV ECHO MEAS - BSA(HAYCOCK): 1.6 M^2
BH CV ECHO MEAS - BSA: 1.6 M^2
BH CV ECHO MEAS - BZI_BMI: 27.3 KILOGRAMS/M^2
BH CV ECHO MEAS - BZI_METRIC_HEIGHT: 149.9 CM
BH CV ECHO MEAS - BZI_METRIC_WEIGHT: 61.2 KG
BH CV ECHO MEAS - EDV(CUBED): 55.3 ML
BH CV ECHO MEAS - EDV(MOD-SP4): 127 ML
BH CV ECHO MEAS - EDV(TEICH): 62.3 ML
BH CV ECHO MEAS - EF(CUBED): 62.4 %
BH CV ECHO MEAS - EF(MOD-SP4): 67.8 %
BH CV ECHO MEAS - EF(TEICH): 54.7 %
BH CV ECHO MEAS - ESV(CUBED): 20.8 ML
BH CV ECHO MEAS - ESV(MOD-SP4): 40.9 ML
BH CV ECHO MEAS - ESV(TEICH): 28.3 ML
BH CV ECHO MEAS - FS: 27.8 %
BH CV ECHO MEAS - IVS/LVPW: 1.1
BH CV ECHO MEAS - IVSD: 1.4 CM
BH CV ECHO MEAS - LA DIMENSION: 3.4 CM
BH CV ECHO MEAS - LA/AO: 1.2
BH CV ECHO MEAS - LAT PEAK E' VEL: 8.2 CM/SEC
BH CV ECHO MEAS - LV DIASTOLIC VOL/BSA (35-75): 81.4 ML/M^2
BH CV ECHO MEAS - LV MASS(C)D: 183.1 GRAMS
BH CV ECHO MEAS - LV MASS(C)DI: 117.3 GRAMS/M^2
BH CV ECHO MEAS - LV MAX PG: 5.7 MMHG
BH CV ECHO MEAS - LV MEAN PG: 3 MMHG
BH CV ECHO MEAS - LV SYSTOLIC VOL/BSA (12-30): 26.2 ML/M^2
BH CV ECHO MEAS - LV V1 MAX: 119 CM/SEC
BH CV ECHO MEAS - LV V1 MEAN: 82.6 CM/SEC
BH CV ECHO MEAS - LV V1 VTI: 28.1 CM
BH CV ECHO MEAS - LVIDD: 3.8 CM
BH CV ECHO MEAS - LVIDS: 2.8 CM
BH CV ECHO MEAS - LVLD AP4: 7.7 CM
BH CV ECHO MEAS - LVLS AP4: 6.2 CM
BH CV ECHO MEAS - LVOT AREA (M): 3.1 CM^2
BH CV ECHO MEAS - LVOT AREA: 3.1 CM^2
BH CV ECHO MEAS - LVOT DIAM: 2 CM
BH CV ECHO MEAS - LVPWD: 1.3 CM
BH CV ECHO MEAS - MED PEAK E' VEL: 6.2 CM/SEC
BH CV ECHO MEAS - MV A MAX VEL: 101 CM/SEC
BH CV ECHO MEAS - MV DEC SLOPE: 392 CM/SEC^2
BH CV ECHO MEAS - MV DEC TIME: 0.23 SEC
BH CV ECHO MEAS - MV E MAX VEL: 88.3 CM/SEC
BH CV ECHO MEAS - MV E/A: 0.87
BH CV ECHO MEAS - SI(AO): 185.7 ML/M^2
BH CV ECHO MEAS - SI(CUBED): 22.1 ML/M^2
BH CV ECHO MEAS - SI(LVOT): 56.6 ML/M^2
BH CV ECHO MEAS - SI(MOD-SP4): 55.2 ML/M^2
BH CV ECHO MEAS - SI(TEICH): 21.8 ML/M^2
BH CV ECHO MEAS - SV(AO): 289.7 ML
BH CV ECHO MEAS - SV(CUBED): 34.5 ML
BH CV ECHO MEAS - SV(LVOT): 88.3 ML
BH CV ECHO MEAS - SV(MOD-SP4): 86.1 ML
BH CV ECHO MEAS - SV(TEICH): 34.1 ML
BH CV ECHO MEASUREMENTS AVERAGE E/E' RATIO: 12.26
BUN BLD-MCNC: 18 MG/DL (ref 8–23)
BUN/CREAT SERPL: 4 (ref 7–25)
CALCIUM SPEC-SCNC: 9 MG/DL (ref 8.6–10.5)
CHLORIDE SERPL-SCNC: 97 MMOL/L (ref 98–107)
CO2 SERPL-SCNC: 24 MMOL/L (ref 22–29)
CREAT BLD-MCNC: 4.5 MG/DL (ref 0.57–1)
GFR SERPL CREATININE-BSD FRML MDRD: 10 ML/MIN/1.73
GFR SERPL CREATININE-BSD FRML MDRD: ABNORMAL ML/MIN/{1.73_M2}
GLUCOSE BLD-MCNC: 118 MG/DL (ref 65–99)
GLUCOSE BLDC GLUCOMTR-MCNC: 239 MG/DL (ref 70–130)
GLUCOSE BLDC GLUCOMTR-MCNC: 271 MG/DL (ref 70–130)
LEFT ATRIUM VOLUME INDEX: 14.3 ML/M2
LEFT ATRIUM VOLUME: 22.3 CM3
MAXIMAL PREDICTED HEART RATE: 159 BPM
PHOSPHATE SERPL-MCNC: 5.7 MG/DL (ref 2.5–4.5)
POTASSIUM BLD-SCNC: 4.4 MMOL/L (ref 3.5–5.2)
SODIUM BLD-SCNC: 138 MMOL/L (ref 136–145)
STRESS TARGET HR: 135 BPM

## 2019-12-08 PROCEDURE — 93306 TTE W/DOPPLER COMPLETE: CPT | Performed by: INTERNAL MEDICINE

## 2019-12-08 PROCEDURE — 25010000002 ONDANSETRON PER 1 MG: Performed by: INTERNAL MEDICINE

## 2019-12-08 PROCEDURE — 25010000002 PERFLUTREN 6.52 MG/ML SUSPENSION: Performed by: INTERNAL MEDICINE

## 2019-12-08 PROCEDURE — 82962 GLUCOSE BLOOD TEST: CPT

## 2019-12-08 PROCEDURE — 63710000001 INSULIN LISPRO (HUMAN) PER 5 UNITS: Performed by: INTERNAL MEDICINE

## 2019-12-08 PROCEDURE — 25010000002 HEPARIN (PORCINE) PER 1000 UNITS: Performed by: INTERNAL MEDICINE

## 2019-12-08 PROCEDURE — 80069 RENAL FUNCTION PANEL: CPT | Performed by: INTERNAL MEDICINE

## 2019-12-08 PROCEDURE — 93306 TTE W/DOPPLER COMPLETE: CPT

## 2019-12-08 RX ORDER — NICOTINE POLACRILEX 4 MG
15 LOZENGE BUCCAL
Status: DISCONTINUED | OUTPATIENT
Start: 2019-12-08 | End: 2019-12-11 | Stop reason: HOSPADM

## 2019-12-08 RX ORDER — CARVEDILOL 6.25 MG/1
6.25 TABLET ORAL 2 TIMES DAILY WITH MEALS
Status: DISCONTINUED | OUTPATIENT
Start: 2019-12-08 | End: 2019-12-11 | Stop reason: HOSPADM

## 2019-12-08 RX ORDER — DEXTROSE MONOHYDRATE 25 G/50ML
25 INJECTION, SOLUTION INTRAVENOUS
Status: DISCONTINUED | OUTPATIENT
Start: 2019-12-08 | End: 2019-12-11 | Stop reason: HOSPADM

## 2019-12-08 RX ORDER — CALCITRIOL 0.25 UG/1
0.25 CAPSULE, LIQUID FILLED ORAL DAILY
Status: DISCONTINUED | OUTPATIENT
Start: 2019-12-08 | End: 2019-12-11 | Stop reason: HOSPADM

## 2019-12-08 RX ADMIN — ACETAMINOPHEN 650 MG: 325 TABLET, FILM COATED ORAL at 08:10

## 2019-12-08 RX ADMIN — CALCITRIOL 0.25 MCG: 0.25 CAPSULE ORAL at 15:16

## 2019-12-08 RX ADMIN — SODIUM CHLORIDE, PRESERVATIVE FREE 10 ML: 5 INJECTION INTRAVENOUS at 20:20

## 2019-12-08 RX ADMIN — HEPARIN SODIUM 5000 UNITS: 5000 INJECTION, SOLUTION INTRAVENOUS; SUBCUTANEOUS at 08:10

## 2019-12-08 RX ADMIN — ONDANSETRON HYDROCHLORIDE 4 MG: 2 SOLUTION INTRAMUSCULAR; INTRAVENOUS at 13:16

## 2019-12-08 RX ADMIN — GABAPENTIN 100 MG: 100 CAPSULE ORAL at 08:10

## 2019-12-08 RX ADMIN — CALCIUM ACETATE 667 MG: 667 CAPSULE ORAL at 17:14

## 2019-12-08 RX ADMIN — GABAPENTIN 100 MG: 100 CAPSULE ORAL at 20:20

## 2019-12-08 RX ADMIN — PERFLUTREN 8.48 MG: 6.52 INJECTION, SUSPENSION INTRAVENOUS at 09:29

## 2019-12-08 RX ADMIN — CARVEDILOL 6.25 MG: 6.25 TABLET, FILM COATED ORAL at 12:06

## 2019-12-08 RX ADMIN — INSULIN LISPRO 3 UNITS: 100 INJECTION, SOLUTION INTRAVENOUS; SUBCUTANEOUS at 20:30

## 2019-12-08 RX ADMIN — HEPARIN SODIUM 5000 UNITS: 5000 INJECTION, SOLUTION INTRAVENOUS; SUBCUTANEOUS at 20:20

## 2019-12-08 RX ADMIN — INSULIN LISPRO 4 UNITS: 100 INJECTION, SOLUTION INTRAVENOUS; SUBCUTANEOUS at 17:14

## 2019-12-08 RX ADMIN — SODIUM CHLORIDE, PRESERVATIVE FREE 10 ML: 5 INJECTION INTRAVENOUS at 08:11

## 2019-12-08 RX ADMIN — ONDANSETRON HYDROCHLORIDE 4 MG: 2 SOLUTION INTRAMUSCULAR; INTRAVENOUS at 06:32

## 2019-12-08 RX ADMIN — CARVEDILOL 6.25 MG: 6.25 TABLET, FILM COATED ORAL at 17:14

## 2019-12-08 RX ADMIN — HEPARIN SODIUM 5000 UNITS: 5000 INJECTION, SOLUTION INTRAVENOUS; SUBCUTANEOUS at 13:13

## 2019-12-08 NOTE — PROGRESS NOTES
" PROGRESS NOTE.      Patient:  Mini Gentile  YOB: 1960  Date of Service: 12/8/2019  MRN: [id number]   Acct: [id number]   Primary Care Physician: Ivan Kelly MD  Advance Directive:   Code Status and Medical Interventions:   Ordered at: 12/06/19 1408     Level Of Support Discussed With:    Patient     Code Status:    CPR     Medical Interventions (Level of Support Prior to Arrest):    Full     Admit Date: 12/6/2019       Hospital Day: 2  Referring Provider: Fuentes Messina MD      Patient Seen, Chart, Consults, Notes, Labs, Radiology studies reviewed.        Subjective:  Mini Gentile is a 59 y.o. female  whom we were consulted for end stage renal disease management. Patient has previously been on chronic maintenance hemodialysis at [facility] dialysis unit. She has decided then to quit in September 2019.  She has benign hypertension and type 2 diabetes complicated with diabetic nephropathy.  She has an arteriovenous fistula in her left upper extremity.  Patient presented to the emergency room today complaining of increasing shortness of breath and malaise.  She was agreeing to restart dialysis.  Her bun was 123 and her serum creatinine was 14.1. She denies dysuria, fever, nausea, vomiting and diarrhea.   Today, she is status post dialysis on 12/7.  She was complaining of nausea and vomiting.    Review of Systems:  History obtained from chart review and the patient  General ROS: No fever or chills  Respiratory ROS: No cough, shortness of breath, wheezing  Cardiovascular ROS: no chest pain or dyspnea on exertion  Gastrointestinal ROS: No abdominal pain or melena  Genito-Urinary ROS: No dysuria or hematuria  Musculoskeletal: negative  Skin: negative    Objective:  /52   Pulse 91   Temp 97.9 °F (36.6 °C) (Oral)   Resp 18   Ht 149.9 cm (59.02\")   Wt 61.2 kg (134 lb 14.7 oz)   SpO2 91%   BMI 27.24 kg/m²     Intake/Output Summary (Last 24 hours) at 12/8/2019 " 1314  Last data filed at 12/8/2019 0300  Gross per 24 hour   Intake --   Output 100 ml   Net -100 ml       Physical examination:  General: awake/alert   Chest:  clear to auscultation bilaterally without respiratory distress  CVS: regular rate and rhythm  Abdominal: soft, nontender, normal bowel sounds  Extremities: no cyanosis or edema  Skin: warm and dry without rash  Neuro: No focal motor deficits    Labs:  Lab Results (last 24 hours)     Procedure Component Value Units Date/Time    Renal Function Panel [[id number]]  (Abnormal) Collected:  12/08/19 0520    Specimen:  Blood Updated:  12/08/19 0559     Glucose 118 mg/dL      BUN 18 mg/dL      Creatinine 4.50 mg/dL      Sodium 138 mmol/L      Potassium 4.4 mmol/L      Chloride 97 mmol/L      CO2 24.0 mmol/L      Calcium 9.0 mg/dL      Albumin 3.90 g/dL      Phosphorus 5.7 mg/dL      Anion Gap 17.0 mmol/L      BUN/Creatinine Ratio 4.0     eGFR Non African Amer 10 mL/min/1.73      Comment: <15 Indicative of kidney failure.        eGFR   Amer --     Comment: <15 Indicative of kidney failure.       Narrative:       GFR Normal >60  Chronic Kidney Disease <60  Kidney Failure <15            Radiology:   Imaging Results (Last 24 Hours)     ** No results found for the last 24 hours. **              Assessment   -End-stage renal disease  -Type 2 diabetes with renal disease  -Benign hypertension  -Noncompliance  -Metabolic acidosis  -Hyperkalemia  -Anemia of chronic kidney disease  -Nausea-vomiting      Plan:  Next dialysis is due on 12/9.  Follow-up labs.  Arrange outpatient dialysis placement at [place].      Ten Boyd MD  12/8/2019  1:14 PM

## 2019-12-08 NOTE — PROGRESS NOTES
[facility] Medicine Services  INPATIENT PROGRESS NOTE    Patient Name: Mini Gentile  Date of Admission: 12/6/2019  Today's Date: 12/08/19  Length of Stay: 2  Primary Care Physician: Ivan Kelly MD    Subjective   Chief Complaint: nausea  HPI     Patient seen and examined at bedside.  Patient overall doing well.  Patient had a little bit of nausea with emesis this morning.  Patient noted to have hypertension.  Patient indicates that she has been kicked out of her dialysis chair, and she needs reinstituted into a new dialysis chair.  Patient indicates that she is interested in home dialysis.  But she understands that this will take time to get set up if it is even feasible.  Patient will need to be reinitiated with a dialysis chair.        Review of Systems   Constitutional: Positive for fatigue. Negative for activity change, appetite change, chills, diaphoresis and fever.   Respiratory: Negative for cough and shortness of breath.    Cardiovascular: Negative for chest pain and palpitations.   Gastrointestinal: Positive for nausea and vomiting. Negative for abdominal distention, abdominal pain and constipation.   Neurological: Positive for weakness.        All pertinent negatives and positives are as above. All other systems have been reviewed and are negative unless otherwise stated.     Objective    Temp:  [97.9 °F (36.6 °C)-98 °F (36.7 °C)] 97.9 °F (36.6 °C)  Heart Rate:  [88-95] 95  Resp:  [16-20] 18  BP: (131-182)/(54-67) 182/62  Physical Exam  Constitutional: She is oriented to person, place, and time. No distress.   HENT:   Head: Normocephalic and atraumatic.   Eyes: Conjunctivae are normal. No scleral icterus.   Neck: Neck supple. No JVD present.   Cardiovascular: Normal rate, regular rhythm and intact distal pulses.   Murmur heard.  Pulmonary/Chest: Effort normal and breath sounds normal. No stridor. No respiratory distress. She has no wheezes.  No rales     Abdominal: Soft. Bowel sounds are normal. She exhibits no distension and no mass. There is no tenderness. There is no guarding.   Musculoskeletal: She exhibits edema.   Neurological: She is alert and oriented to person, place, and time.   Skin: Skin is warm and dry. She is not diaphoretic. No erythema.   Psychiatric: She has a normal mood and affect. Her behavior is normal.   Nursing note and vitals reviewed.      Results Review:  I have reviewed the labs, radiology results, and diagnostic studies.    Laboratory Data:   Results from last 7 days   Lab Units 12/06/19  1144   WBC 10*3/mm3 4.94   HEMOGLOBIN g/dL 7.9*   HEMATOCRIT % 23.8*   PLATELETS 10*3/mm3 278        Results from last 7 days   Lab Units 12/08/19  0520 12/07/19  0345 12/06/19  1144   SODIUM mmol/L 138 138 147*   POTASSIUM mmol/L 4.4 3.2* 5.3*   CHLORIDE mmol/L 97* 94* 110*   CO2 mmol/L 24.0 23.0 8.0*   BUN mg/dL 18 35* 123*   CREATININE mg/dL 4.50* 6.54* 14.18*   CALCIUM mg/dL 9.0 8.9 9.3   BILIRUBIN mg/dL  --   --  0.3   ALK PHOS U/L  --   --  66   ALT (SGPT) U/L  --   --  9   AST (SGOT) U/L  --   --  14   GLUCOSE mg/dL 118* 116* 72       Culture Data:   No results found for: BLOODCX, URINECX, WOUNDCX, MRSACX, RESPCX, STOOLCX    Radiology Data:   Imaging Results (Last 24 Hours)     ** No results found for the last 24 hours. **          I have reviewed the patient's current medications.     Assessment/Plan     Active Hospital Problems    Diagnosis   • Anemia due to chronic kidney disease, on chronic dialysis (CMS/HCC)   • Acute pulmonary edema (CMS/HCC)   • Non-compliance with renal dialysis (CMS/[facility])   • Elevated troponin due to ESRD    • Hyperkalemia   • Encephalopathy, metabolic, due to uremia   • High anion gap metabolic acidosis due to uremia   • Type 2 diabetes mellitus, with long-term current use of insulin (CMS/[facility])   • End stage renal failure on dialysis (CMS/[facility])   • Diabetic nephropathy (CMS/[facility])   • HTN (hypertension)       Plan:  1.   "Received dialysis 12/6, 12/7 - Continue dialysis per nephrology  2.  Edema seems to resolved  3.  Counseled on compliance  4.  Resume home medicatoins as appropriate  5.  Heparin DVT PPx  6.  Echo pending read   7.   consult for \"home dialysis\" per patient request  8.  AM renal function   9.  Encephalopathy has seemed to have resolved  10.  PT/OT   11.  Patient unable to remember medications, will start carvedilol 6.25 mg   12.   consult as patient does not have a dialysis chair                   Discharge Planning: I expect the patient to be discharged to home in ? days.    [place] home once dialysis chair obtained     Fuentes Messina MD   12/08/19   10:26 AM  "

## 2019-12-08 NOTE — PROGRESS NOTES
Continued Stay Note   Db     Patient Name: Mini Gentile  MRN: [id number]  Today's Date: 12/8/2019    Admit Date: 12/6/2019    Discharge Plan     Row Name 12/08/19 1040       Plan    Plan Comments  SW received consult in regards to pt thinking she has lost her HD chair. SW attempted to contact [facility] but they are closed on Sundays. SW will contact [facility] tomorrow to make sure pt has chair time prior to discharge.         Discharge Codes    No documentation.             Cindy Alberts

## 2019-12-08 NOTE — PAYOR COMM NOTE
"ADMIT INPT 12-6-19  MEDICARE PRIMARY  UR  [phone]    Mini Peña (59 y.o. Female)     Date of Birth Social Security Number Address Home Phone MRN    [id number]  [id number] [place] [id number] [phone] [id number]    Hindu Marital Status          Other        Admission Date Admission Type Admitting Provider Attending Provider Department, Room/Bed    12/6/19 Emergency Fuentes Messina MD Fleming, John Eric, MD [facility] 4C, 462/1    Discharge Date Discharge Disposition Discharge Destination                       Attending Provider:  Fuentes Messina MD    Allergies:  Bupropion, Chantix [Varenicline], Sulfa Antibiotics, Azithromycin, Levofloxacin, Penicillins    Isolation:  None   Infection:  None   Code Status:  CPR    Ht:  149.9 cm (59\")   Wt:  61.2 kg (135 lb)    Admission Cmt:  None   Principal Problem:  None                Active Insurance as of 12/6/2019     Primary Coverage     Payor Plan Insurance Group Employer/Plan Group    MEDICARE MEDICARE A & B      Payor Plan Address Payor Plan Phone Number Payor Plan Fax Number Effective Dates    [address] [phone]  7/1/2017 - None Entered    [facility] [id number]       Subscriber Name Subscriber Birth Date Member ID       MINI PEÑA 1960 [id number]           Secondary Coverage     Payor Plan Insurance Group Employer/Plan Group    [employer] [place]      Payor Plan Address Payor Plan Phone Number Payor Plan Fax Number Effective Dates    [address] [address] [phone]  11/4/2016 - None Entered    [facility] [id number]       Subscriber Name Subscriber Birth Date Member ID       MINI PEÑA 1960 [id number]                 Emergency Contacts      (Rel.) Home Phone Work Phone Mobile Phone    Erica Bowling (Daughter) -- -- [phone]            Vital Signs (last day)     Date/Time   Temp   Temp src   Pulse   Resp   BP   Patient Position   SpO2    12/07/19 1943   --   " --   89   18   --   --   96    12/07/19 1609   98 (36.7)   Oral   88   18   137/54   Lying   94    12/07/19 0823   --   --   83   16   178/72   Lying   --    12/07/19 0720   98.3 (36.8)   Oral   86   18   140/52   Lying   96    12/07/19 0340   98 (36.7)   Oral   89   20   156/46   Lying   97    12/06/19 2154   97.6 (36.4)   Oral   88   18   169/58   Lying   97    12/06/19 2001   97.4 (36.3)   Oral   91   24   176/89   Lying   99    12/06/19 19:01:25   97.4 (36.3)   Oral   --   --   --   --   --    12/06/19 18:48:33   --   --   85   20   134/99   Lying   95    12/06/19 1415   --   --   95   16   180/71   --   96    12/06/19 1336   --   --   91   18   --   --   95    12/06/19 1333   --   --   --   --   160/70   --   --    12/06/19 1105   97.8 (36.6)   Oral   92   20   176/63   Sitting   98              Oxygen Therapy (last day)     Date/Time   SpO2   Device (Oxygen Therapy)   Flow (L/min)   Oxygen Concentration (%)   ETCO2 (mmHg)    12/07/19 1943   96   room air   --   --   --    12/07/19 1609   94   --   --   --   --    12/07/19 0755   --   room air   --   --   --    12/07/19 0720   96   --   --   --   --    12/07/19 0340   97   room air   --   --   --    12/06/19 2154   97   room air   --   --   --    12/06/19 2001 99   room air   --   --   --    12/06/19 1933   --   room air   --   --   --    12/06/19 18:48:33   95   room air   --   --   --    12/06/19 1415   96   --   --   --   --    12/06/19 1336   95   --   --   --   --    12/06/19 1105   98   room air   --   --   --              Intake & Output (last day)       12/07 0701 - 12/08 0700    P.O. 0    Total Intake(mL/kg) 0 (0)    Urine (mL/kg/hr)     Other 1500    Total Output 1500    Net -1500             Lines, Drains & Airways    Active LDAs     Name:   Placement date:   Placement time:   Site:   Days:    Peripheral IV 12/06/19 1238 Right Antecubital   12/06/19    1238    Antecubital   1                Hospital Medications (active)       Dose Frequency Start  "End    acetaminophen (TYLENOL) 160 MG/5ML solution 650 mg 650 mg Every 4 Hours PRN 12/6/2019     Admin Instructions: Do not exceed 4 grams of acetaminophen in a 24 hr period.<BR><BR>If given for pain, use the following pain scale: <BR>Mild Pain = Pain Score of 1-3, CPOT 1-2<BR>Moderate Pain = Pain Score of 4-6, CPOT 3-4<BR>Severe Pain = Pain Score of 7-10, CPOT 5-8    Route: Oral    Linked Group 1:  \"Or\" Linked Group Details        acetaminophen (TYLENOL) suppository 650 mg 650 mg Every 4 Hours PRN 12/6/2019     Admin Instructions: Do not exceed 4 grams of acetaminophen in a 24 hr period.<BR><BR>If given for pain, use the following pain scale: <BR>Mild Pain = Pain Score of 1-3, CPOT 1-2<BR>Moderate Pain = Pain Score of 4-6, CPOT 3-4<BR>Severe Pain = Pain Score of 7-10, CPOT 5-8    Route: Rectal    Linked Group 1:  \"Or\" Linked Group Details        acetaminophen (TYLENOL) tablet 650 mg 650 mg Every 4 Hours PRN 12/6/2019     Admin Instructions: Do not exceed 4 grams of acetaminophen in a 24 hr period.<BR><BR>If given for pain, use the following pain scale: <BR>Mild Pain = Pain Score of 1-3, CPOT 1-2<BR>Moderate Pain = Pain Score of 4-6, CPOT 3-4<BR>Severe Pain = Pain Score of 7-10, CPOT 5-8    Route: Oral    Linked Group 1:  \"Or\" Linked Group Details        epoetin vika-epbx (RETACRIT) injection 10,000 Units 10,000 Units 3 Times Weekly 12/9/2019     Route: Subcutaneous    gabapentin (NEURONTIN) capsule 100 mg 100 mg Every 12 Hours Scheduled 12/6/2019     Admin Instructions:     Route: Oral    heparin (porcine) 5000 UNIT/ML injection 5,000 Units 5,000 Units Every 8 Hours Scheduled 12/6/2019     Route: Subcutaneous    Influenza Vac Subunit Quad (FLUCELVAX) injection 0.5 mL 0.5 mL During Hospitalization 12/9/2019     Admin Instructions: **Do Not Administer if Temperature Greater Than 102F & Notify Pharmacy** Pneumococcal & Influenza Vaccines May Be Given at the Same Time in SEPARATE Injections.    Route: Intramuscular    " "ondansetron (ZOFRAN) injection 4 mg 4 mg Every 6 Hours PRN 12/6/2019     Admin Instructions: If BOTH ondansetron (ZOFRAN) and promethazine (PHENERGAN) are ordered use ondansetron first and THEN promethazine IF ondansetron is ineffective.    Route: Intravenous    Linked Group 2:  \"Or\" Linked Group Details        ondansetron (ZOFRAN) tablet 4 mg 4 mg Every 6 Hours PRN 12/6/2019     Admin Instructions: If BOTH ondansetron (ZOFRAN) and promethazine (PHENERGAN) are ordered use ondansetron first and THEN promethazine IF ondansetron is ineffective.    Route: Oral    Linked Group 2:  \"Or\" Linked Group Details        sodium chloride 0.9 % flush 10 mL 10 mL As Needed 12/6/2019     Route: Intravenous    Cosign for Ordering: Accepted by Angel Rodriguez Jr., MD on 12/6/2019 12:49 PM    Linked Group 3:  \"And\" Linked Group Details        sodium chloride 0.9 % flush 10 mL 10 mL Every 12 Hours Scheduled 12/6/2019     Route: Intravenous    sodium chloride 0.9 % flush 10 mL 10 mL As Needed 12/6/2019     Route: Intravenous          Blood Administration Record (From admission, onward)    None          Lab Results (last 24 hours)     Procedure Component Value Units Date/Time    Hepatitis B Surface Antibody [[id number]]  (Abnormal) Collected:  12/06/19 1942    Specimen:  Blood Updated:  12/07/19 1207     Hep B S Ab Reactive    Renal Function Panel [[id number]]  (Abnormal) Collected:  12/07/19 0345    Specimen:  Blood Updated:  12/07/19 0450     Glucose 116 mg/dL      BUN 35 mg/dL      Creatinine 6.54 mg/dL      Sodium 138 mmol/L      Potassium 3.2 mmol/L      Chloride 94 mmol/L      CO2 23.0 mmol/L      Calcium 8.9 mg/dL      Albumin 3.90 g/dL      Phosphorus 5.2 mg/dL      Anion Gap 21.0 mmol/L      BUN/Creatinine Ratio 5.4     eGFR Non African Amer 6 mL/min/1.73      Comment: <15 Indicative of kidney failure.        eGFR   Amer --     Comment: <15 Indicative of kidney failure.       Narrative:       GFR Normal >60  Chronic Kidney " Disease <60  Kidney Failure <15      Iron Profile [[id number]]  (Abnormal) Collected:  12/07/19 0345    Specimen:  Blood Updated:  12/07/19 0443     Iron 61 mcg/dL      Iron Saturation 27 %      Transferrin 153 mg/dL      TIBC 228 mcg/dL     Hepatitis Panel, Acute [[id number]]  (Normal) Collected:  12/06/19 1942    Specimen:  Blood Updated:  12/06/19 2019     Hepatitis B Surface Ag Non-Reactive     Hep A IgM Non-Reactive     Hep B C IgM Non-Reactive     Hepatitis C Ab Non-Reactive    PTH, Intact [[id number]]  (Abnormal) Collected:  12/06/19 1942    Specimen:  Blood Updated:  12/06/19 2008     PTH, Intact 359.5 pg/mL         Imaging Results (Last 24 Hours)     ** No results found for the last 24 hours. **        Orders (last 24 hrs)      Start     Ordered    12/09/19 0900  epoetin vika-epbx (RETACRIT) injection 10,000 Units  Once per day on Mon Wed Fri 12/07/19 1350    12/09/19 0800  Influenza Vac Subunit Quad (FLUCELVAX) injection 0.5 mL  During Hospitalization      12/06/19 2222    12/08/19 0600  Renal Function Panel  Morning Draw      12/07/19 1455    12/07/19 1400  potassium chloride (MICRO-K) CR capsule 40 mEq  Once      12/07/19 1303    12/07/19 1328  PT Consult: Eval & Treat As Tolerated  Once      12/07/19 1327    12/07/19 1328  OT Consult: Eval & Treat  Once      12/07/19 1327    12/07/19 0800  Inpatient Case Management  Consult  Once     Provider:  (Not yet assigned)    12/06/19 2220    12/07/19 0600  Basic Metabolic Panel  Morning Draw,   Status:  Canceled      12/06/19 1546    12/07/19 0600  Phosphorus  Morning Draw,   Status:  Canceled      12/06/19 1546    12/07/19 0600  Iron Profile  Morning Draw      12/06/19 1546    12/07/19 0600  Renal Function Panel  Morning Draw      12/06/19 1947    12/07/19 0000  Hemodialysis inpatient  Once      12/06/19 1545    12/06/19 2100  gabapentin (NEURONTIN) capsule 100 mg  Every 12 Hours Scheduled      12/06/19 2007 12/06/19 1430  heparin (porcine)  5000 UNIT/ML injection 5,000 Units  Every 8 Hours Scheduled      12/06/19 1409    12/06/19 1411  sodium chloride 0.9 % flush 10 mL  Every 12 Hours Scheduled      12/06/19 1409    12/06/19 1409  sodium chloride 0.9 % flush 10 mL  As Needed      12/06/19 1409    12/06/19 1408  ondansetron (ZOFRAN) tablet 4 mg  Every 6 Hours PRN      12/06/19 1409    12/06/19 1408  ondansetron (ZOFRAN) injection 4 mg  Every 6 Hours PRN      12/06/19 1409    12/06/19 1408  acetaminophen (TYLENOL) tablet 650 mg  Every 4 Hours PRN      12/06/19 1409    12/06/19 1408  acetaminophen (TYLENOL) 160 MG/5ML solution 650 mg  Every 4 Hours PRN      12/06/19 1409    12/06/19 1408  acetaminophen (TYLENOL) suppository 650 mg  Every 4 Hours PRN      12/06/19 1409    12/06/19 1216  sodium chloride 0.9 % flush 10 mL  As Needed      12/06/19 1217    --  SCANNED EKG      12/06/19 0000                Ventilator/Non-Invasive Ventilation Settings (From admission, onward)    None           Physician Progress Notes (last 24 hours) (Notes from 12/06/19 1949 through 12/07/19 1949)      Ten Boyd MD at 12/07/19 1348           PROGRESS NOTE.      Patient:  Mini Gentile  YOB: 1960  Date of Service: 12/7/2019  MRN: [id number]   Acct: [id number]   Primary Care Physician: Ivan Kelly MD  Advance Directive:   Code Status and Medical Interventions:   Ordered at: 12/06/19 1408     Level Of Support Discussed With:    Patient     Code Status:    CPR     Medical Interventions (Level of Support Prior to Arrest):    Full     Admit Date: 12/6/2019       Hospital Day: 1  Referring Provider: Fuentes Messina MD      Patient Seen, Chart, Consults, Notes, Labs, Radiology studies reviewed.        Subjective:  Mini Gentile is a 59 y.o. female  whom we were consulted for end stage renal disease management. Patient has previously been on chronic maintenance hemodialysis at [facility]. She has decided then to  "quit in September 2019.  She has benign hypertension and type 2 diabetes complicated with diabetic nephropathy.  She has an arteriovenous fistula in her left upper extremity.  Patient presented to the emergency room today complaining of increasing shortness of breath and malaise.  She was agreeing to restart dialysis.  Her bun was 123 and her serum creatinine was 14.1. She denies dysuria, fever, nausea, vomiting and diarrhea.   Today, she is status post dialysis.  Admitted being nauseated.    Review of Systems:  History obtained from chart review and the patient  General ROS: No fever or chills  Respiratory ROS: No cough, shortness of breath, wheezing  Cardiovascular ROS: no chest pain or dyspnea on exertion  Gastrointestinal ROS: No abdominal pain or melena  Genito-Urinary ROS: No dysuria or hematuria  Musculoskeletal: negative  Skin: negative    Objective:  /72 (BP Location: Right arm, Patient Position: Lying)   Pulse 83   Temp 98.3 °F (36.8 °C) (Oral)   Resp 16   Ht 149.9 cm (59\")   Wt 61.2 kg (135 lb)   SpO2 96%   BMI 27.27 kg/m²      Intake/Output Summary (Last 24 hours) at 12/7/2019 1348  Last data filed at 12/7/2019 1238  Gross per 24 hour   Intake 0 ml   Output 1700 ml   Net -1700 ml       Physical examination:  General: awake/alert   Chest:  clear to auscultation bilaterally without respiratory distress  CVS: regular rate and rhythm  Abdominal: soft, nontender, normal bowel sounds  Extremities: no cyanosis or edema  Skin: warm and dry without rash  Neuro: No focal motor deficits    Labs:  Lab Results (last 24 hours)     Procedure Component Value Units Date/Time    Hepatitis B Surface Antibody [[id number]]  (Abnormal) Collected:  12/06/19 1942    Specimen:  Blood Updated:  12/07/19 1207     Hep B S Ab Reactive    Renal Function Panel [[id number]]  (Abnormal) Collected:  12/07/19 0345    Specimen:  Blood Updated:  12/07/19 0450     Glucose 116 mg/dL      BUN 35 mg/dL      Creatinine 6.54 mg/dL      " Sodium 138 mmol/L      Potassium 3.2 mmol/L      Chloride 94 mmol/L      CO2 23.0 mmol/L      Calcium 8.9 mg/dL      Albumin 3.90 g/dL      Phosphorus 5.2 mg/dL      Anion Gap 21.0 mmol/L      BUN/Creatinine Ratio 5.4     eGFR Non African Amer 6 mL/min/1.73      Comment: <15 Indicative of kidney failure.        eGFR   Amer --     Comment: <15 Indicative of kidney failure.       Narrative:       GFR Normal >60  Chronic Kidney Disease <60  Kidney Failure <15      Iron Profile [[id number]]  (Abnormal) Collected:  12/07/19 0345    Specimen:  Blood Updated:  12/07/19 0443     Iron 61 mcg/dL      Iron Saturation 27 %      Transferrin 153 mg/dL      TIBC 228 mcg/dL     Hepatitis Panel, Acute [[id number]]  (Normal) Collected:  12/06/19 1942    Specimen:  Blood Updated:  12/06/19 2019     Hepatitis B Surface Ag Non-Reactive     Hep A IgM Non-Reactive     Hep B C IgM Non-Reactive     Hepatitis C Ab Non-Reactive    PTH, Intact [[id number]]  (Abnormal) Collected:  12/06/19 1942    Specimen:  Blood Updated:  12/06/19 2008     PTH, Intact 359.5 pg/mL     POC Glucose Once [[id number]]  (Normal) Collected:  12/06/19 1856    Specimen:  Blood Updated:  12/06/19 1907     Glucose 78 mg/dL      Comment: : [id number] Yaya Bryan ID: [id number]             Radiology:   Imaging Results (Last 24 Hours)     ** No results found for the last 24 hours. **              Assessment   -End-stage renal disease  -Type 2 diabetes with renal disease  -Benign hypertension  -Noncompliance  -Metabolic acidosis  -Hyperkalemia  -Anemia of chronic kidney disease      Plan:  Next dialysis is due on 12/9.  Follow-up labs.  Arrange outpatient dialysis placement at [facility].      Ten Boyd MD  12/7/2019  1:48 PM    Electronically signed by Ten Boyd MD at 12/07/19 9250     Fuentes Messina MD at 12/07/19 1304              [facility] Medicine Services  INPATIENT PROGRESS  NOTE    Patient Name: Mini Gentile  Date of Admission: 12/6/2019  Today's Date: 12/07/19  Length of Stay: 1  Primary Care Physician: Ivan Kelly MD    Subjective   Chief Complaint: weakness  HPI     Patient was seen and examined at bedside.  Patient overall doing well.  Patient is more alert and oriented, and does not appear confused today.  She is already had dialysis this morning and did very well.  Patient denies any nausea vomiting diarrhea.  Patient denies any fever or chills.  Patient will likely need at least 1 more dialysis session and then likely can be discharged home.  We will have physical therapy and occupational therapy work with her due to her weakness.        Review of Systems   Constitutional: Negative for chills and fever.   Respiratory: Negative for cough and shortness of breath.    Cardiovascular: Negative for chest pain and palpitations.   Gastrointestinal: Negative for abdominal distention, abdominal pain, diarrhea and nausea.        All pertinent negatives and positives are as above. All other systems have been reviewed and are negative unless otherwise stated.     Objective    Temp:  [97.4 °F (36.3 °C)-98.3 °F (36.8 °C)] 98.3 °F (36.8 °C)  Heart Rate:  [83-95] 83  Resp:  [16-24] 16  BP: (134-180)/(46-99) 178/72  Physical Exam   Constitutional: She is oriented to person, place, and time. No distress.   HENT:   Head: Normocephalic and atraumatic.   Eyes: Conjunctivae are normal. No scleral icterus.   Neck: Neck supple. No JVD present.   Cardiovascular: Normal rate, regular rhythm and intact distal pulses.   Murmur heard.  Pulmonary/Chest: Effort normal and breath sounds normal. No stridor. No respiratory distress. She has no wheezes.   Abdominal: Soft. Bowel sounds are normal. She exhibits no distension and no mass. There is no tenderness. There is no guarding.   Musculoskeletal: She exhibits edema.   Neurological: She is alert and oriented to person, place, and time.    Skin: Skin is warm and dry. She is not diaphoretic. No erythema.   Psychiatric: She has a normal mood and affect. Her behavior is normal.   Nursing note and vitals reviewed.          Results Review:  I have reviewed the labs, radiology results, and diagnostic studies.    Laboratory Data:   Results from last 7 days   Lab Units 12/06/19  1144   WBC 10*3/mm3 4.94   HEMOGLOBIN g/dL 7.9*   HEMATOCRIT % 23.8*   PLATELETS 10*3/mm3 278        Results from last 7 days   Lab Units 12/07/19  0345 12/06/19  1144   SODIUM mmol/L 138 147*   POTASSIUM mmol/L 3.2* 5.3*   CHLORIDE mmol/L 94* 110*   CO2 mmol/L 23.0 8.0*   BUN mg/dL 35* 123*   CREATININE mg/dL 6.54* 14.18*   CALCIUM mg/dL 8.9 9.3   BILIRUBIN mg/dL  --  0.3   ALK PHOS U/L  --  66   ALT (SGPT) U/L  --  9   AST (SGOT) U/L  --  14   GLUCOSE mg/dL 116* 72       Culture Data:   No results found for: BLOODCX, URINECX, WOUNDCX, MRSACX, RESPCX, STOOLCX    Radiology Data:   Imaging Results (Last 24 Hours)     Procedure Component Value Units Date/Time    XR Chest 1 View [[id number]] Collected:  12/06/19 1332     Updated:  12/06/19 1339    Narrative:       XR CHEST 1 VW- 12/6/2019 1:13 PM CST     HISTORY: dyspnea       COMPARISON: Chest exam dated 09/03/2019.     FINDINGS:   Fairly extensive bibasilar lung consolidation with obscuration of both  hemidiaphragms and what appears to be a trace layering left pleural  effusion. Additional mild patchy infiltrate in the paramedian right  upper lobe. Bilateral diffuse coarsened interstitial markings with what  appears to be subpleural Kerley B lines in the right base. Underlying  mild cardiomegaly. No pneumothorax. Incidentally noted vascular stent in  the region of the left brachiocephalic vein. No acute bony abnormality.        Impression:       1. Bilateral interstitial and basilar consolidative infiltrates. There  appear to be subpleural Kerley B lines in the right base, suggesting a  volume overload with interstitial and casey  "pulmonary edema. This seems  less likely infectious.        This report was finalized on 12/06/2019 13:35 by Dr Jhonatan Berry, .          I have reviewed the patient's current medications.     Assessment/Plan     Active Hospital Problems    Diagnosis   • Anemia due to chronic kidney disease, on chronic dialysis (CMS/[facility]ly [facility])   • Acute pulmonary edema (CMS/[facility])   • Non-compliance with renal dialysis (CMS/[facility]ly [facility])   • Elevated troponin due to ESRD    • Hyperkalemia   • Encephalopathy, metabolic, due to uremia   • High anion gap metabolic acidosis due to uremia   • Type 2 diabetes mellitus, with long-term current use of insulin (CMS/[facility])   • End stage renal failure on dialysis (CMS/[facility]ly [facility])   • Diabetic nephropathy (CMS/[facility])   • HTN (hypertension)       Plan:  1.  Received dialysis 12/6, 12/7 - Continue dialysis per nephrology  2.  Edema seems to resolved  3.  Counseled on compliance  4.  Resume home medicatoins as appropriate  5.  Heparin DVT PPx  6.  Echo pending  7.   consult for \"home dialysis\" per patient request  8.  AM renal function   9.  Encephalopathy has seemed to have resolved  10.  PT/OT               Discharge Planning: I expect the patient to be discharged to home in 1-2 days.    Fuentes Messina MD   12/07/19   1:05 PM    Electronically signed by Fuentes Messina MD at 12/07/19 1455       Consult Notes (last 24 hours) (Notes from 12/06/19 1949 through 12/07/19 1949)    No notes of this type exist for this encounter.         "

## 2019-12-08 NOTE — PLAN OF CARE
Problem: Patient Care Overview  Goal: Plan of Care Review  Outcome: Ongoing (interventions implemented as appropriate)  Flowsheets (Taken 12/8/2019 0256)  Progress: improving  Plan of Care Reviewed With: patient  Outcome Summary: Patient medicated for C/O pain to bilateral lower extremities with scheduled medication with good results. Dialysis again Monday. Safety maintained. VSS.

## 2019-12-09 LAB
ALBUMIN SERPL-MCNC: 3.8 G/DL (ref 3.5–5.2)
ANION GAP SERPL CALCULATED.3IONS-SCNC: 19 MMOL/L (ref 5–15)
BUN BLD-MCNC: 31 MG/DL (ref 8–23)
BUN/CREAT SERPL: 4.6 (ref 7–25)
CALCIUM SPEC-SCNC: 9 MG/DL (ref 8.6–10.5)
CHLORIDE SERPL-SCNC: 96 MMOL/L (ref 98–107)
CO2 SERPL-SCNC: 24 MMOL/L (ref 22–29)
CREAT BLD-MCNC: 6.69 MG/DL (ref 0.57–1)
GFR SERPL CREATININE-BSD FRML MDRD: 6 ML/MIN/1.73
GFR SERPL CREATININE-BSD FRML MDRD: ABNORMAL ML/MIN/{1.73_M2}
GLUCOSE BLD-MCNC: 100 MG/DL (ref 65–99)
GLUCOSE BLDC GLUCOMTR-MCNC: 107 MG/DL (ref 70–130)
GLUCOSE BLDC GLUCOMTR-MCNC: 110 MG/DL (ref 70–130)
GLUCOSE BLDC GLUCOMTR-MCNC: 231 MG/DL (ref 70–130)
GLUCOSE BLDC GLUCOMTR-MCNC: 236 MG/DL (ref 70–130)
PHOSPHATE SERPL-MCNC: 6.3 MG/DL (ref 2.5–4.5)
POTASSIUM BLD-SCNC: 4.2 MMOL/L (ref 3.5–5.2)
SODIUM BLD-SCNC: 139 MMOL/L (ref 136–145)

## 2019-12-09 PROCEDURE — 82962 GLUCOSE BLOOD TEST: CPT

## 2019-12-09 PROCEDURE — 97161 PT EVAL LOW COMPLEX 20 MIN: CPT

## 2019-12-09 PROCEDURE — 25010000002 INFLUENZA VAC SUBUNIT QUAD 0.5 ML SUSPENSION PREFILLED SYRINGE: Performed by: INTERNAL MEDICINE

## 2019-12-09 PROCEDURE — 80069 RENAL FUNCTION PANEL: CPT | Performed by: INTERNAL MEDICINE

## 2019-12-09 PROCEDURE — 25010000002 HEPARIN (PORCINE) PER 1000 UNITS: Performed by: INTERNAL MEDICINE

## 2019-12-09 PROCEDURE — 90674 CCIIV4 VAC NO PRSV 0.5 ML IM: CPT | Performed by: INTERNAL MEDICINE

## 2019-12-09 PROCEDURE — G0008 ADMIN INFLUENZA VIRUS VAC: HCPCS | Performed by: INTERNAL MEDICINE

## 2019-12-09 PROCEDURE — 97165 OT EVAL LOW COMPLEX 30 MIN: CPT

## 2019-12-09 PROCEDURE — 25010000002 EPOETIN ALFA-EPBX 10000 UNIT/ML SOLUTION: Performed by: INTERNAL MEDICINE

## 2019-12-09 PROCEDURE — 63710000001 INSULIN LISPRO (HUMAN) PER 5 UNITS: Performed by: INTERNAL MEDICINE

## 2019-12-09 RX ORDER — ASPIRIN 81 MG/1
81 TABLET ORAL DAILY
Status: DISCONTINUED | OUTPATIENT
Start: 2019-12-09 | End: 2019-12-11 | Stop reason: HOSPADM

## 2019-12-09 RX ORDER — SERTRALINE HYDROCHLORIDE 25 MG/1
25 TABLET, FILM COATED ORAL DAILY
Status: DISCONTINUED | OUTPATIENT
Start: 2019-12-09 | End: 2019-12-10

## 2019-12-09 RX ORDER — PRAVASTATIN SODIUM 20 MG
10 TABLET ORAL NIGHTLY
Status: DISCONTINUED | OUTPATIENT
Start: 2019-12-09 | End: 2019-12-11 | Stop reason: HOSPADM

## 2019-12-09 RX ADMIN — INSULIN LISPRO 3 UNITS: 100 INJECTION, SOLUTION INTRAVENOUS; SUBCUTANEOUS at 17:48

## 2019-12-09 RX ADMIN — CALCITRIOL 0.25 MCG: 0.25 CAPSULE ORAL at 12:29

## 2019-12-09 RX ADMIN — GABAPENTIN 100 MG: 100 CAPSULE ORAL at 20:59

## 2019-12-09 RX ADMIN — SERTRALINE HYDROCHLORIDE 25 MG: 25 TABLET ORAL at 12:30

## 2019-12-09 RX ADMIN — ASPIRIN 81 MG: 81 TABLET ORAL at 12:29

## 2019-12-09 RX ADMIN — HEPARIN SODIUM 5000 UNITS: 5000 INJECTION, SOLUTION INTRAVENOUS; SUBCUTANEOUS at 21:00

## 2019-12-09 RX ADMIN — HEPARIN SODIUM 5000 UNITS: 5000 INJECTION, SOLUTION INTRAVENOUS; SUBCUTANEOUS at 06:41

## 2019-12-09 RX ADMIN — HEPARIN SODIUM 5000 UNITS: 5000 INJECTION, SOLUTION INTRAVENOUS; SUBCUTANEOUS at 13:52

## 2019-12-09 RX ADMIN — PRAVASTATIN SODIUM 10 MG: 20 TABLET ORAL at 20:59

## 2019-12-09 RX ADMIN — CALCIUM ACETATE 667 MG: 667 CAPSULE ORAL at 17:49

## 2019-12-09 RX ADMIN — INFLUENZA A VIRUS A/IDAHO/07/2018 (H1N1) ANTIGEN (MDCK CELL DERIVED, PROPIOLACTONE INACTIVATED, INFLUENZA A VIRUS A/INDIANA/08/2018 (H3N2) ANTIGEN (MDCK CELL DERIVED, PROPIOLACTONE INACTIVATED), INFLUENZA B VIRUS B/SINGAPORE/INFTT-16-0610/2016 ANTIGEN (MDCK CELL DERIVED, PROPIOLACTONE INACTIVATED), INFLUENZA B VIRUS B/IOWA/06/2017 ANTIGEN (MDCK CELL DERIVED, PROPIOLACTONE INACTIVATED) 0.5 ML: 15; 15; 15; 15 INJECTION, SUSPENSION INTRAMUSCULAR at 13:52

## 2019-12-09 RX ADMIN — INSULIN LISPRO 3 UNITS: 100 INJECTION, SOLUTION INTRAVENOUS; SUBCUTANEOUS at 21:00

## 2019-12-09 RX ADMIN — CALCIUM ACETATE 667 MG: 667 CAPSULE ORAL at 12:30

## 2019-12-09 RX ADMIN — EPOETIN ALFA-EPBX 10000 UNITS: 10000 INJECTION, SOLUTION INTRAVENOUS; SUBCUTANEOUS at 12:37

## 2019-12-09 RX ADMIN — CARVEDILOL 6.25 MG: 6.25 TABLET, FILM COATED ORAL at 12:30

## 2019-12-09 RX ADMIN — GABAPENTIN 100 MG: 100 CAPSULE ORAL at 12:38

## 2019-12-09 RX ADMIN — CARVEDILOL 6.25 MG: 6.25 TABLET, FILM COATED ORAL at 17:49

## 2019-12-09 NOTE — PLAN OF CARE
Problem: Patient Care Overview  Goal: Plan of Care Review  Outcome: Ongoing (interventions implemented as appropriate)  Flowsheets (Taken 12/9/2019 1614)  Progress: improving  Plan of Care Reviewed With: patient  Outcome Summary: VSS, no c/o of pain down for dialysis today, tolerated well, 900 ml off per report, no c/o SOB, walked in hallway with therapy, up for shower, pt states she only had some dribbling yesterday when up to BR but no urine output this shift, flu vaccine given to rt deltoid

## 2019-12-09 NOTE — PROGRESS NOTES
Nephrology ([facility]) Progress Note      Patient:  Mini Gentile  YOB: 1960  Date of Service: 12/9/2019  MRN: [id number]   Acct: [id number]   Primary Care Physician: Ivan Kelly MD  Advance Directive:   Code Status and Medical Interventions:   Ordered at: 12/06/19 1408     Level Of Support Discussed With:    Patient     Code Status:    CPR     Medical Interventions (Level of Support Prior to Arrest):    Full     Admit Date: 12/6/2019       Hospital Day: 3  Referring Provider: Fuentes Messina MD      Patient personally seen and examined.  Complete chart including Consults, Notes, Operative Reports, Labs, Cardiology, and Radiology studies reviewed as able.        Subjective:  Mini Gentile is a 59 y.o. female  whom we were consulted for end stage renal disease.  Was previously on chronic hemodialysis at [facility].  Patient decided to quit dialysis in September.  History of type 2 diabetes, hypertension.  Presented to emergency department with dyspnea, confusion, and malaise; agreed to resume dialysis. Had urgent hemodialysis on 12/06; second treatment on 12/07; tolerated well and overall condition improved post-dialysis.    Today is seen during dialysis. No new overnight issues.  Dialysis   Pt was seen on RRT; tolerating well  Modality: Hemodialysis  Access: Arterial Venous Fistula  Location: left upper  QB: 450  QD: 700  UF: 2000    Allergies:  Bupropion; Chantix [varenicline]; Sulfa antibiotics; Azithromycin; Levofloxacin; and Penicillins    Home Meds:  Medications Prior to Admission   Medication Sig Dispense Refill Last Dose   • aspirin 81 MG EC tablet Take 81 mg by mouth Daily.   12/5/2019 at Unknown time   • benzonatate (TESSALON) 100 MG capsule Take 1 capsule by mouth 3 (Three) Times a Day As Needed for Cough. 20 capsule 0 12/5/2019 at Unknown time   • brimonidine (ALPHAGAN) 0.2 % ophthalmic solution Administer 1 drop into the  left eye 3 (Three) Times a Day.   12/5/2019 at Unknown time   • calcitriol (ROCALTROL) 0.25 MCG capsule Take 0.25 mcg by mouth Take As Directed. Only given on dialysis days (Tuesday, Thursday, Saturday)   12/5/2019 at Unknown time   • cetirizine (zyrTEC) 10 MG tablet Take 10 mg by mouth At Night As Needed (allergies).   12/5/2019 at Unknown time   • cholecalciferol (VITAMIN D3) 1000 units tablet Take 1,000 Units by mouth Daily.   12/5/2019 at Unknown time   • dorzolamide (TRUSOPT) 2 % ophthalmic solution Administer 1 drop into the left eye 2 (Two) Times a Day.   12/5/2019 at Unknown time   • famotidine (PEPCID) 20 MG tablet Take 20 mg by mouth 2 (Two) Times a Day.   12/5/2019 at Unknown time   • fluticasone (FLONASE) 50 MCG/ACT nasal spray 1 spray into the nostril(s) as directed by provider 2 (Two) Times a Day.   12/5/2019 at Unknown time   • guaiFENesin (MUCINEX) 600 MG 12 hr tablet Take 2 tablets by mouth Every 12 (Twelve) Hours.   12/5/2019 at Unknown time   • homatropine 5 % ophthalmic solution Administer 1 drop into the left eye Daily.   12/5/2019 at Unknown time   • insulin glargine (LANTUS) 100 UNIT/ML injection Inject 10 Units under the skin Every Night.   12/5/2019 at Unknown time   • lidocaine-prilocaine (EMLA) 2.5-2.5 % cream Apply 1 application topically to the appropriate area as directed Take As Directed. Apply to access area 30 minutes prior to dialysis.    12/5/2019 at Unknown time   • losartan (COZAAR) 25 MG tablet Take 50 mg by mouth Daily.   12/6/2019 at Unknown time   • midodrine (PROAMATINE) 5 MG tablet Take 5 mg by mouth Take As Directed. as needed for low blood pressure during dialysis.   12/5/2019 at Unknown time   • Netarsudil Dimesylate (RHOPRESSA) 0.02 % solution Administer 1 drop into the left eye Every Night.   12/5/2019 at Unknown time   • pravastatin (PRAVACHOL) 10 MG tablet Take 10 mg by mouth Every Night.   12/5/2019 at Unknown time   • sertraline (ZOLOFT) 25 MG tablet Take 25 mg by  mouth Every Night.   12/5/2019 at Unknown time   • sevelamer (RENAGEL) 800 MG tablet Take 1,600 mg by mouth 3 (Three) Times a Day With Meals.   12/5/2019 at Unknown time   • timolol (TIMOPTIC) 0.5 % ophthalmic solution Administer 1 drop into the left eye Every Morning.   12/5/2019 at Unknown time   • travoprost, JUDE free, (TRAVATAN Z) 0.004 % solution ophthalmic solution Administer 1 drop into the left eye Every Night.   12/5/2019 at Unknown time   • predniSONE (DELTASONE) 20 MG tablet  Take 4 tablets by mouth daily for 3 days, 2 tablets daily for 3 days, 1 tablet daily for 3 days then stop 11 tablet 0        Medicines:  Current Facility-Administered Medications   Medication Dose Route Frequency Provider Last Rate Last Dose   • acetaminophen (TYLENOL) tablet 650 mg  650 mg Oral Q4H PRN Fuentes Messina MD   650 mg at 12/08/19 0810    Or   • acetaminophen (TYLENOL) 160 MG/5ML solution 650 mg  650 mg Oral Q4H PRN Fuentes Messina MD        Or   • acetaminophen (TYLENOL) suppository 650 mg  650 mg Rectal Q4H PRN Fuentes Messina MD       • calcitriol (ROCALTROL) capsule 0.25 mcg  0.25 mcg Oral Daily Ten Boyd MD   0.25 mcg at 12/08/19 1516   • calcium acetate (PHOSLO) capsule 667 mg  667 mg Oral TID With Meals Ten Boyd MD   667 mg at 12/08/19 1714   • carvedilol (COREG) tablet 6.25 mg  6.25 mg Oral BID With Meals Fuentes Messina MD   6.25 mg at 12/08/19 1714   • dextrose (D50W) 25 g/ 50mL Intravenous Solution 25 g  25 g Intravenous Q15 Min PRN Fuentes Messina MD       • dextrose (GLUTOSE) oral gel 15 g  15 g Oral Q15 Min PRN Fuentes Messina MD       • epoetin vkia-epbx (RETACRIT) injection 10,000 Units  10,000 Units Subcutaneous Once per day on Mon Wed Fri Ten Boyd MD       • gabapentin (NEURONTIN) capsule 100 mg  100 mg Oral Q12H Federico Clancy DO   100 mg at 12/08/19 2020   • glucagon (human recombinant) (GLUCAGEN DIAGNOSTIC) injection 1 mg  1  mg Subcutaneous Q15 Min PRN Fuentes Messina MD       • heparin (porcine) 5000 UNIT/ML injection 5,000 Units  5,000 Units Subcutaneous Q8H Fuentes Messina MD   5,000 Units at 19 0641   • Influenza Vac Subunit Quad (FLUCELVAX) injection 0.5 mL  0.5 mL Intramuscular During Hospitalization Fuentes Messina MD       • insulin lispro (humaLOG) injection 2-7 Units  2-7 Units Subcutaneous 4x Daily With Meals & Nightly Fuentes Messina MD   3 Units at 19 2030   • ondansetron (ZOFRAN) tablet 4 mg  4 mg Oral Q6H PRN Fuentes Messina MD        Or   • ondansetron (ZOFRAN) injection 4 mg  4 mg Intravenous Q6H PRN Fuentes Messina MD   4 mg at 19 1316   • sodium chloride 0.9 % flush 10 mL  10 mL Intravenous PRN Daniela Esteban APRN       • sodium chloride 0.9 % flush 10 mL  10 mL Intravenous Q12H Fuentes Messina MD   10 mL at 19 2020   • sodium chloride 0.9 % flush 10 mL  10 mL Intravenous PRN Fuentes Messina MD           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    • Chronic otitis media    • Diabetes (CMS/HCC)    • Eustachian tube dysfunction    • Glaucoma    • HTN (hypertension)    • Kidney failure     on dialysis   • Liver disorder    • Mucoid otitis media        Past Surgical History:  Past Surgical History:   Procedure Laterality Date   • ARTERIOVENOUS FISTULA     • CATARACT EXTRACTION WITH INTRAOCULAR LENS IMPLANT     •  SECTION     • CHOLECYSTECTOMY     • MYRINGOTOMY W/ TUBES Bilateral    • MYRINGOTOMY W/ TUBES Right    • TUBAL ABDOMINAL LIGATION         Family History  Family History   Problem Relation Age of Onset   • Heart disease Mother        Social History  Social History     Socioeconomic History   • Marital status:      Spouse name: Not on file   • Number of children: Not on file   • Years of education: Not on file   • Highest education level: Not on file   Tobacco Use  "  • Smoking status: Current Every Day Smoker     Packs/day: 0.50     Years: 6.00     Pack years: 3.00     Types: Cigarettes   • Smokeless tobacco: Never Used   Substance and Sexual Activity   • Alcohol use: No   • Drug use: No   • Sexual activity: Defer         Review of Systems:  History obtained from chart review and the patient  General ROS: No fever or chills  Respiratory ROS: No cough, shortness of breath, wheezing  Cardiovascular ROS: No chest pain or palpitations  Gastrointestinal ROS: No abdominal pain or melena  Genito-Urinary ROS: No dysuria or hematuria  Neurological ROS: no headache or dizziness    Objective:  Patient Vitals for the past 24 hrs:   BP Temp Temp src Pulse Resp SpO2 Height Weight   12/09/19 0727 117/63 98 °F (36.7 °C) Oral 76 18 100 % -- --   12/09/19 0414 149/63 97.7 °F (36.5 °C) Oral 62 16 97 % -- --   12/08/19 1951 109/52 98.1 °F (36.7 °C) Oral 71 14 97 % -- --   12/08/19 1500 112/48 98 °F (36.7 °C) Oral 77 18 96 % -- --   12/08/19 1206 136/52 -- -- 91 -- -- -- --   12/08/19 0907 -- -- -- -- -- -- 149.9 cm (59.02\") 61.2 kg (134 lb 14.7 oz)     No intake or output data in the 24 hours ending 12/09/19 0855  General: awake/alert    Neck: supple, no JVD  Chest:  clear to auscultation bilaterally without respiratory distress  CVS: regular rate and rhythm  Abdominal: soft, nontender, positive bowel sounds  Extremities: no cyanosis or edema  Skin: warm and dry without rash  Neuro: no focal motor deficits    Labs:  Results from last 7 days   Lab Units 12/06/19  1144   WBC 10*3/mm3 4.94   HEMOGLOBIN g/dL 7.9*   HEMATOCRIT % 23.8*   PLATELETS 10*3/mm3 278         Results from last 7 days   Lab Units 12/09/19  0359 12/08/19  0520 12/07/19  0345 12/06/19  1144   SODIUM mmol/L 139 138 138 147*   POTASSIUM mmol/L 4.2 4.4 3.2* 5.3*   CHLORIDE mmol/L 96* 97* 94* 110*   CO2 mmol/L 24.0 24.0 23.0 8.0*   BUN mg/dL 31* 18 35* 123*   CREATININE mg/dL 6.69* 4.50* 6.54* 14.18*   CALCIUM mg/dL 9.0 9.0 8.9 9.3 "   BILIRUBIN mg/dL  --   --   --  0.3   ALK PHOS U/L  --   --   --  66   ALT (SGPT) U/L  --   --   --  9   AST (SGOT) U/L  --   --   --  14   GLUCOSE mg/dL 100* 118* 116* 72       Radiology:   Imaging Results (Last 72 Hours)     Procedure Component Value Units Date/Time    XR Chest 1 View [[id number]] Collected:  12/06/19 1332     Updated:  12/06/19 1339    Narrative:       XR CHEST 1 VW- 12/6/2019 1:13 PM CST     HISTORY: dyspnea       COMPARISON: Chest exam dated 09/03/2019.     FINDINGS:   Fairly extensive bibasilar lung consolidation with obscuration of both  hemidiaphragms and what appears to be a trace layering left pleural  effusion. Additional mild patchy infiltrate in the paramedian right  upper lobe. Bilateral diffuse coarsened interstitial markings with what  appears to be subpleural Kerley B lines in the right base. Underlying  mild cardiomegaly. No pneumothorax. Incidentally noted vascular stent in  the region of the left brachiocephalic vein. No acute bony abnormality.        Impression:       1. Bilateral interstitial and basilar consolidative infiltrates. There  appear to be subpleural Kerley B lines in the right base, suggesting a  volume overload with interstitial and casey pulmonary edema. This seems  less likely infectious.        This report was finalized on 12/06/2019 13:35 by Dr Jhonatan Berry, .          Culture:  No results found for: BLOODCX, URINECX, WOUNDCX, MRSACX, RESPCX, STOOLCX      Assessment   1.  End stage renal disease on hemodialysis  2.  Type 2 diabetes with nephropathy  3.  Essential hypertension  4.  Metabolic acidosis  5.  Hyperkalemia  6.  Anemia of CKD  7.  Medical noncompliance    Plan:  1.  Dialysis today  2.  Need to verify if patient still has outpatient chair time; if not, will need to arrange prior to discharge.  3.  Monitor labs      Gomez Lemos, APRN  12/9/2019  8:55 AM

## 2019-12-09 NOTE — THERAPY EVALUATION
Patient Name: Mini Gentile  : 1960    MRN: [id number]                              Today's Date: 2019       Admit Date: 2019    Visit Dx:     ICD-10-CM ICD-9-CM   1. End stage renal failure on dialysis (CMS/[facility]) N18.6 585.6    Z99.2 V45.11   2. Hypervolemia, unspecified hypervolemia type E87.70 276.69   3. Dyspnea, unspecified type R06.00 786.09   4. 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   • Eustachian tube dysfunction   • Chronic otitis media   • Pneumonia of left upper lobe due to Streptococcus (CMS/[facility])   • End stage renal failure on dialysis (CMS/[facility])   • Diabetic nephropathy (CMS/[facility])   • Glaucoma   • HTN (hypertension)   • Proteinuria   • Type 2 diabetes mellitus, with long-term current use of insulin (CMS/[facility])   • Anemia due to chronic kidney disease, on chronic dialysis (CMS/[facility])   • Acute pulmonary edema (CMS/[facility])   • Non-compliance with renal dialysis (CMS/[facility])   • Elevated troponin due to ESRD    • Hyperkalemia   • Encephalopathy, metabolic, due to uremia   • High anion gap metabolic acidosis due to uremia     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    • Chronic otitis media    • Diabetes (CMS/[facility])    • Eustachian tube dysfunction    • Glaucoma    • HTN (hypertension)    • Kidney failure     on dialysis   • Liver disorder    • Mucoid otitis media      Past Surgical History:   Procedure Laterality Date   • ARTERIOVENOUS FISTULA     • CATARACT EXTRACTION WITH INTRAOCULAR LENS IMPLANT     •  SECTION     • CHOLECYSTECTOMY     • MYRINGOTOMY W/ TUBES Bilateral    • MYRINGOTOMY W/ TUBES Right    • TUBAL ABDOMINAL LIGATION       General Information     Row Name 19 1314          PT Evaluation Time/Intention    Document Type  evaluation ESRD; non-compliance w/ renal dialysis; generalized weakness  -SB (r) MB (t) SB (c)     Mode of  Treatment  physical therapy  -SB (r) MB (t) SB (c)     Row Name 12/09/19 1314          General Information    Patient Profile Reviewed?  yes  -SB (r) MB (t) SB (c)     Prior Level of Function  independent:;all household mobility;community mobility;ADL's;dependent:;driving;min assist:;cooking;cleaning  -SB (r) MB (t) SB (c)     Existing Precautions/Restrictions  fall  -SB (r) MB (t) SB (c)     Barriers to Rehab  none identified  -SB (r) MB (t) SB (c)     Row Name 12/09/19 1314          Relationship/Environment    Lives With  significant other  -SB (r) MB (t) SB (c)     Row Name 12/09/19 1314          Resource/Environmental Concerns    Current Living Arrangements  home/apartment/condo  -SB (r) MB (t) SB (c)     Row Name 12/09/19 1314          Home Main Entrance    Number of Stairs, Main Entrance  four  -SB (r) MB (t) SB (c)     Stair Railings, Main Entrance  railings on both sides of stairs  -SB (r) MB (t) SB (c)     Row Name 12/09/19 1314          Stairs Within Home, Primary    Number of Stairs, Within Home, Primary  none  -SB (r) MB (t) SB (c)     Row Name 12/09/19 1314          Cognitive Assessment/Intervention- PT/OT    Orientation Status (Cognition)  oriented x 4  -SB (r) MB (t) SB (c)     Row Name 12/09/19 1314          Safety Issues, Functional Mobility    Safety Issues Affecting Function (Mobility)  positioning of assistive device  -SB (r) MB (t) SB (c)     Impairments Affecting Function (Mobility)  balance;endurance/activity tolerance;strength  -SB (r) MB (t) SB (c)       User Key  (r) = Recorded By, (t) = Taken By, (c) = Cosigned By    Initials Name Provider Type    Gracy Ag, PT DPT Physical Therapist    Mary Alice Nagel, PT Student PT Student        Mobility     Row Name 12/09/19 1314          Bed Mobility Assessment/Treatment    Bed Mobility Assessment/Treatment  supine-sit  -SB (r) MB (t) SB (c)     Supine-Sit [place] (Bed Mobility)  supervision  -SB (r) MB (t) SB (c)     Assistive Device  (Bed Mobility)  head of bed elevated  -SB (r) MB (t) SB (c)     Row Name 12/09/19 1314          Transfer Assessment/Treatment    Comment (Transfers)  Pt required max VC for proper placement and use of RW during stand to sit t/f  -SB (r) MB (t) SB (c)     Row Name 12/09/19 1314          Sit-Stand Transfer    Sit-Stand [place] (Transfers)  contact guard  -SB (r) MB (t) SB (c)     Assistive Device (Sit-Stand Transfers)  walker, front-wheeled  -SB (r) MB (t) SB (c)     Row Name 12/09/19 1314          Gait/Stairs Assessment/Training    Gait/Stairs Assessment/Training  gait/ambulation assistive device  -SB (r) MB (t) SB (c)     [place] Level (Gait)  contact guard;verbal cues  -SB (r) MB (t) SB (c)     Assistive Device (Gait)  walker, front-wheeled  -SB (r) MB (t) SB (c)     Distance in Feet (Gait)  120 ft  -SB (r) MB (t) SB (c)     Deviations/Abnormal Patterns (Gait)  gait speed decreased;base of support, narrow;other (see comments) uneven step lengths  -SB (r) MB (t) SB (c)     Comment (Gait/Stairs)  Pt demonstrated uneven step lengths, decreased speed, and wide [place]TE causing decreased balance. Pt demonstrated extreme decreased balance while turning around in hallway w/ RW. Pt required max VC for proper placement and use of RW.  -SB (r) MB (t) SB (c)       User Key  (r) = Recorded By, (t) = Taken By, (c) = Cosigned By    Initials Name Provider Type    Gracy Ag, PT DPT Physical Therapist    Mary Alice Nagel, PT Student PT Student        Obj/Interventions     Row Name 12/09/19 1314          General ROM    GENERAL ROM COMMENTS  BLE AROM WFL  -SB (r) MB (t) SB (c)     Row Name 12/09/19 1314          MMT (Manual Muscle Testing)    General MMT Comments  B hips 4/5, R knee 3+/5, L knee 4-/5, R ankle 4/5, L ankle 3+/5  -SB (r) MB (t) SB (c)     Row Name 12/09/19 1314          Static Sitting Balance    Level of [place] (Unsupported Sitting, Static Balance)  supervision  -SB (r) MB (t) SB (c)     Sitting  Position (Unsupported Sitting, Static Balance)  sitting on edge of bed  -SB (r) MB (t) SB (c)     Comment (Unsupported Sitting, Static Balance)  pt demo no LOB  -SB (r) MB (t) SB (c)     Row Name 12/09/19 1314          Dynamic Sitting Balance    Level of [place], Reaches Outside Midline (Sitting, Dynamic Balance)  supervision;contact guard assist  -SB (r) MB (t) SB (c)     Sitting Position, Reaches Outside Midline (Sitting, Dynamic Balance)  sitting on edge of bed  -SB (r) MB (t) SB (c)     Comment, Reaches Outside Midline (Sitting, Dynamic Balance)  Pt required CGA to correct posterior lean during BLE MMT  -SB (r) MB (t) SB (c)     Row Name 12/09/19 1314          Static Standing Balance    Level of [place] (Supported Standing, Static Balance)  contact guard assist  -SB (r) MB (t) SB (c)     Comment (Supported Standing, Static Balance)  Pt demonstrated a postural sway when examining static standing balance w/o using RW.  -SB (r) MB (t) SB (c)     Row Name 12/09/19 1314          Dynamic Standing Balance    Level of [place], Reaches Outside Midline (Standing, Dynamic Balance)  contact guard assist  -SB (r) MB (t) SB (c)     Comment, Reaches Outside Midline (Standing, Dynamic Balance)  Pt accepted minimal perturbations and unable to reach outside of [place] w/o demonstrating increased postural sway and LOB  -SB (r) MB (t) SB (c)     Row Name 12/09/19 1314          Sensory Assessment/Intervention    Sensory General Assessment  no sensation deficits identified  -SB (r) MB (t) SB (c)       User Key  (r) = Recorded By, (t) = Taken By, (c) = Cosigned By    Initials Name Provider Type    Gracy Ag, PT DPT Physical Therapist    Mary Alice Nagel, PT Student PT Student        Goals/Plan     Row Name 12/09/19 1314          Bed Mobility Goal 1 (PT)    Activity/Assistive Device (Bed Mobility Goal 1, PT)  bed mobility activities, all  -SB (r) MB (t) SB (c)     [place] Level/Cues Needed (Bed Mobility Goal 1,  PT)  independent  -SB (r) MB (t) SB (c)     Time Frame (Bed Mobility Goal 1, PT)  long term goal (LTG)  -SB (r) MB (t) SB (c)     Progress/Outcomes (Bed Mobility Goal 1, PT)  goal ongoing  -SB (r) MB (t) SB (c)     Row Name 12/09/19 1314          Transfer Goal 1 (PT)    Activity/Assistive Device (Transfer Goal 1, PT)  sit-to-stand/stand-to-sit;walker, rolling  -SB (r) MB (t) SB (c)     [place] Level/Cues Needed (Transfer Goal 1, PT)  supervision required  -SB (r) MB (t) SB (c)     Time Frame (Transfer Goal 1, PT)  long term goal (LTG)  -SB (r) MB (t) SB (c)     Progress/Outcome (Transfer Goal 1, PT)  goal ongoing  -SB (r) MB (t) SB (c)     Row Name 12/09/19 1311          Gait Training Goal 1 (PT)    Activity/Assistive Device (Gait Training Goal 1, PT)  gait (walking locomotion);assistive device use;decrease fall risk;improve balance and speed;increase endurance/gait distance;walker, rolling  -SB (r) MB (t) SB (c)     [place] Level (Gait Training Goal 1, PT)  contact guard assist  -SB (r) MB (t) SB (c)     Distance (Gait Goal 1, PT)  200 ft  -SB (r) MB (t) SB (c)     Time Frame (Gait Training Goal 1, PT)  long term goal (LTG)  -SB (r) MB (t) SB (c)     Progress/Outcome (Gait Training Goal 1, PT)  goal ongoing  -SB (r) MB (t) SB (c)     Row Name 12/09/19 1313          Stairs Goal 1 (PT)    Activity/Assistive Device (Stairs Goal 1, PT)  ascending stairs;descending stairs;decrease fall risk;improve balance and speed;other (see comments) w/ or w/o appropriate AD  -SB (r) MB (t) SB (c)     [place] Level/Cues Needed (Stairs Goal 1, PT)  contact guard assist  -SB (r) MB (t) SB (c)     Number of Stairs (Stairs Goal 1, PT)  4  -SB (r) MB (t) SB (c)     Time Frame (Stairs Goal 1, PT)  long term goal (LTG)  -SB (r) MB (t) SB (c)     Progress/Outcome (Stairs Goal 1, PT)  goal ongoing  -SB (r) MB (t) SB (c)       User Key  (r) = Recorded By, (t) = Taken By, (c) = Cosigned By    Initials Name Provider Type    SB  Gracy Timmons, PT DPT Physical Therapist    Mary Alice Nagel, PT Student PT Student        Clinical Impression     Row Name 12/09/19 1314          Pain Assessment    Additional Documentation  Pain Scale: Numbers Pre/Post-Treatment (Group)  -SB (r) MB (t) SB (c)     Row Name 12/09/19 1314          Pain Scale: Numbers Pre/Post-Treatment    Pain Scale: Numbers, Pretreatment  2/10  -SB (r) MB (t) SB (c)     Pain Location - Orientation  lower  -SB (r) MB (t) SB (c)     Pain Location  back  -SB (r) MB (t) SB (c)     Pain Intervention(s)  Medication (See MAR);Ambulation/increased activity;Repositioned  -SB (r) MB (t) SB (c)     Row Name 12/09/19 1314          Plan of Care Review    Plan of Care Reviewed With  patient  -SB (r) MB (t) SB (c)     Progress  no change  -SB (r) MB (t) SB (c)     Outcome Summary  PT evaluation completed. Pt a&o x4. Pt required supervision for sup to sit and static sitting balance. She required CGA to correct posterior lean during MMT BLE and during sit to stand t/f. Pt required CGA and a RW for ambulation along with max VC for positioning and proper use of AD. She demonstrated postural sway when standing w/o RW and was able to accept only minimal perturbations and was unable to reach outside of [place] w/o increased postural sway and LOB. Pt demonstrated unsteadiness when turning in hallway and turning to sit in chair w/ RW. Feel pt will benefit from skilled PT in order to increase balance, strength, and endurance to improve functional mobility. Recommend d/c home w/ asisst. Feel pt would benefit from ambulating w/ a RW at home and in the community upon d/c.  -SB (r) MB (t) SB (c)     Row Name 12/09/19 1314          Physical Therapy Clinical Impression    Patient/Family Goals Statement (PT Clinical Impression)  go home  -SB (r) MB (t) SB (c)     Criteria for Skilled Interventions Met (PT Clinical Impression)  yes  -SB (r) MB (t) SB (c)     Rehab Potential (PT Clinical Summary)  good, to achieve  stated therapy goals  -SB (r) MB (t) SB (c)     Predicted Duration of Therapy (PT)  until d/c  -SB (r) MB (t) SB (c)     Row Name 12/09/19 1314          Positioning and Restraints    Pre-Treatment Position  in bed  -SB (r) MB (t) SB (c)     Post Treatment Position  chair  -SB (r) MB (t) SB (c)     In Chair  notified nsg;sitting;call light within reach;encouraged to call for assist;with nsg  -SB (r) MB (t) SB (c)       User Key  (r) = Recorded By, (t) = Taken By, (c) = Cosigned By    Initials Name Provider Type    Gracy Ag, PT DPT Physical Therapist    Mary Alice Nagel, PT Student PT Student        Outcome Measures     Row Name 12/09/19 1314          How much help from another person do you currently need...    Turning from your back to your side while in flat bed without using bedrails?  4  -SB (r) MB (t) SB (c)     Moving from lying on back to sitting on the side of a flat bed without bedrails?  4  -SB (r) MB (t) SB (c)     Moving to and from a bed to a chair (including a wheelchair)?  3  -SB (r) MB (t) SB (c)     Standing up from a chair using your arms (e.g., wheelchair, bedside chair)?  3  -SB (r) MB (t) SB (c)     Climbing 3-5 steps with a railing?  3  -SB (r) MB (t) SB (c)     To walk in hospital room?  3  -SB (r) MB (t) SB (c)     AM-PAC 6 Clicks Score (PT)  20  -SB (r) MB (t)     Row Name 12/09/19 1314          Functional Assessment    Outcome Measure Options  AM-PAC 6 Clicks Basic Mobility (PT)  -SB (r) MB (t) SB (c)       User Key  (r) = Recorded By, (t) = Taken By, (c) = Cosigned By    Initials Name Provider Type    Gracy Ag, PT DPT Physical Therapist    Mary Alice Nagel, PT Student PT Student          PT Recommendation and Plan  Planned Therapy Interventions (PT Eval): balance training, bed mobility training, gait training, home exercise program, patient/family education, stair training, strengthening, transfer training  Outcome Summary/Treatment Plan (PT)  Anticipated Equipment Needs  at Discharge (PT): front wheeled walker  Anticipated Discharge Disposition (PT): home with assist  Plan of Care Reviewed With: patient  Progress: no change  Outcome Summary: PT evaluation completed. Pt a&o x4. Pt required supervision for sup to sit and static sitting balance. She required CGA to correct posterior lean during MMT BLE and during sit to stand t/f. Pt required CGA and a RW for ambulation along with max VC for positioning and proper use of AD. She demonstrated postural sway when standing w/o RW and was able to accept only minimal perturbations and was unable to reach outside of [place] w/o increased postural sway and LOB. Pt demonstrated unsteadiness when turning in hallway and turning to sit in chair w/ RW. Feel pt will benefit from skilled PT in order to increase balance, strength, and endurance to improve functional mobility. Recommend d/c home w/ asisst. Feel pt would benefit from ambulating w/ a RW at home and in the community upon d/c.     Time Calculation:   PT Charges     Row Name 12/09/19 1314             Time Calculation    Start Time  1314  -SB (r) MB (t) SB (c)      Stop Time  1352  -SB (r) MB (t) SB (c)      Time Calculation (min)  38 min  -SB (r) MB (t)      PT Received On  12/09/19  -SB (r) MB (t) SB (c)      PT Goal Re-Cert Due Date  12/19/19  -SB (r) MB (t) SB (c)        User Key  (r) = Recorded By, (t) = Taken By, (c) = Cosigned By    Initials Name Provider Type    Gracy Ag, PT DPT Physical Therapist    Mary Alice Nagel, PT Student PT Student        Therapy Charges for Today     Code Description Service Date Service Provider Modifiers Qty    80789660975 HC PT EVAL LOW COMPLEXITY 3 12/9/2019 Mary Alice Aldridge, PT Student GP 1          PT G-Codes  Outcome Measure Options: AM-PAC 6 Clicks Daily Activity (OT)  AM-PAC 6 Clicks Score (PT): 20  AM-PAC 6 Clicks Score (OT): 23    FREDERICK Ernst  12/9/2019

## 2019-12-09 NOTE — PLAN OF CARE
Problem: Patient Care Overview  Goal: Plan of Care Review  12/9/2019 1413 by Mary Alice Aldridge, PT Student  Outcome: Ongoing (interventions implemented as appropriate)  Flowsheets (Taken 12/9/2019 1314)  Progress: no change  Plan of Care Reviewed With: patient  Outcome Summary: PT evaluation completed. Pt a&o x4. Pt required supervision for sup to sit and static sitting balance. She required CGA to correct posterior lean during MMT BLE and during sit to stand t/f. Pt required CGA and a RW for ambulation along with max VC for positioning and proper use of AD. She demonstrated postural sway when standing w/o RW and was able to accept only minimal perturbations and was unable to reach outside of [place] w/o increased postural sway and LOB. Pt demonstrated unsteadiness when turning in hallway and turning to sit in chair w/ RW. Feel pt will benefit from skilled PT in order to increase balance, strength, and endurance to improve functional mobility. Recommend d/c home w/ asisst. Feel pt would benefit from ambulating w/ a RW at home and in the community upon d/c.

## 2019-12-09 NOTE — PLAN OF CARE
Problem: Patient Care Overview  Goal: Plan of Care Review  Outcome: Ongoing (interventions implemented as appropriate)  Flowsheets (Taken 12/9/2019 1314)  Progress: no change  Plan of Care Reviewed With: patient  Outcome Summary: PT evaluation completed. Pt a&o x4. Pt required supervision for sup to sit and static sitting balance. She required CGA to correct posterior lean during MMT BLE and during sit to stand t/f. Pt required CGA and a RW for ambulation along with max VC for positioning and proper use of AD. She demonstrated postural sway when standing w/o RW and was able to accept only minimal perturbations and was unable to reach outside of [place] w/o increased postural sway and LOB. Pt demonstrated unsteadiness when turning in hallway and turning to sit in chair w/ RW. Feel pt will benefit from skilled PT in order to increase balance, strength, and endurance to improve functional mobility. Recommend d/c home w/ asisst. Feel pt would benefit from ambulating w/ a RW at home and in the community upon d/c.

## 2019-12-09 NOTE — THERAPY DISCHARGE NOTE
Acute Care - Occupational Therapy Initial Eval/Discharge  [facility]     Patient Name: Mini Gentile  : 1960  MRN: [id number]  Today's Date: 2019  Onset of Illness/Injury or Date of Surgery: 19  Date of Referral to OT: 19  Referring Physician: Dr. Messina      Admit Date: 2019       ICD-10-CM ICD-9-CM   1. End stage renal failure on dialysis (CMS/[facility]) N18.6 585.6    Z99.2 V45.11   2. Hypervolemia, unspecified hypervolemia type E87.70 276.69   3. Dyspnea, unspecified type R06.00 786.09   4. 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   • Eustachian tube dysfunction   • Chronic otitis media   • Pneumonia of left upper lobe due to Streptococcus (CMS/[facility])   • End stage renal failure on dialysis (CMS/[facility])   • Diabetic nephropathy (CMS/[facility])   • Glaucoma   • HTN (hypertension)   • Proteinuria   • Type 2 diabetes mellitus, with long-term current use of insulin (CMS/[facility])   • Anemia due to chronic kidney disease, on chronic dialysis (CMS/[facility])   • Acute pulmonary edema (CMS/[facility])   • Non-compliance with renal dialysis (CMS/[facility])   • Elevated troponin due to ESRD    • Hyperkalemia   • Encephalopathy, metabolic, due to uremia   • High anion gap metabolic acidosis due to uremia     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    • Chronic otitis media    • Diabetes (CMS/[facility])    • Eustachian tube dysfunction    • Glaucoma    • HTN (hypertension)    • Kidney failure     on dialysis   • Liver disorder    • Mucoid otitis media      Past Surgical History:   Procedure Laterality Date   • ARTERIOVENOUS FISTULA     • CATARACT EXTRACTION WITH INTRAOCULAR LENS IMPLANT     •  SECTION     • CHOLECYSTECTOMY     • MYRINGOTOMY W/ TUBES Bilateral    • MYRINGOTOMY W/ TUBES Right    • TUBAL ABDOMINAL LIGATION            OT ASSESSMENT FLOWSHEET (last 12 hours)      Occupational  Therapy Evaluation     Row Name 12/09/19 1332                   OT Evaluation Time/Intention    Subjective Information  complains of;pain  -MW        Document Type  evaluation  -MW        Mode of Treatment  occupational therapy  -MW           General Information    Patient Profile Reviewed?  yes  -MW        Onset of Illness/Injury or Date of Surgery  12/06/19  -MW        Referring Physician  Dr. Messina  -MW        Patient Observations  alert;cooperative;agree to therapy  -MW        Patient/Family Observations  no family present  -MW        General Observations of Patient  awake and alert, no apparent distress  -MW        Prior Level of Function  independent:;all household mobility;community mobility;ADL's;dependent:;driving;min assist:;cooking;cleaning public transportation  -MW        Equipment Currently Used at Home  cane, quad  -MW        Pertinent History of Current Functional Problem  ESRD; non-compliance w/ renal dialysis; generalized weakness  -MW        Existing Precautions/Restrictions  fall  -MW        Risks Reviewed  patient:;LOB;nausea/vomiting;dizziness;increased discomfort;change in vital signs;increased drainage;lines disloged  -MW        Benefits Reviewed  patient:;improve function;increase balance;increase independence;increase strength;decrease pain;decrease risk of DVT;improve skin integrity;increase knowledge  -MW           Relationship/Environment    Lives With  significant other  -MW           Resource/Environmental Concerns    Current Living Arrangements  home/apartment/condo  -MW           Home Main Entrance    Number of Stairs, Main Entrance  four  -MW        Stair Railings, Main Entrance  railings on both sides of stairs  -MW           Cognitive Assessment/Interventions    Additional Documentation  Cognitive Assessment/Intervention (Group)  -MW           Cognitive Assessment/Intervention- PT/OT    Orientation Status (Cognition)  oriented x 4  -MW        Follows Commands (Cognition)  WFL   -        Personal Safety Interventions  fall prevention program maintained;gait belt;nonskid shoes/slippers when out of bed;supervised activity  -           Safety Issues, Functional Mobility    Safety Issues Affecting Function (Mobility)  positioning of assistive device  -        Impairments Affecting Function (Mobility)  balance;endurance/activity tolerance;strength  -MW           Bed Mobility Assessment/Treatment    Bed Mobility Assessment/Treatment  supine-sit  -        Supine-Sit [place] (Bed Mobility)  supervision  -        Assistive Device (Bed Mobility)  head of bed elevated  -           Functional Mobility    Functional Mobility- Ind. Level  contact guard assist  -        Functional Mobility- Device  rolling walker  -        Functional Mobility- Comment  amb to nurses station and back to room, cues for RW placement  -           Transfer Assessment/Treatment    Transfer Assessment/Treatment  sit-stand transfer;stand-sit transfer  -           Sit-Stand Transfer    Sit-Stand [place] (Transfers)  contact guard  -        Assistive Device (Sit-Stand Transfers)  walker, front-wheeled  -           Stand-Sit Transfer    Stand-Sit [place] (Transfers)  contact guard;verbal cues  -           ADL Assessment/Intervention    BADL Assessment/Intervention  lower body dressing  -           Lower Body Dressing Assessment/Training    Lower Body Dressing [place] Level  don;socks;independent  -        Lower Body Dressing Position  long sitting  -           BADL Safety/Performance    Impairments, BADL Safety/Performance  balance  -           General ROM    GENERAL ROM COMMENTS  AROM WFL BUE  -           MMT (Manual Muscle Testing)    General MMT Comments  BUE functionally 4/5, deferred RUE formal assessment d/t fistula for dialysis  -           Motor Assessment/Interventions    Additional Documentation  Balance (Group)  -           Balance    Balance  static sitting  balance;static standing balance  -MW           Static Sitting Balance    Level of [place] (Unsupported Sitting, Static Balance)  supervision  -MW        Sitting Position (Unsupported Sitting, Static Balance)  sitting on edge of bed  -MW           Static Standing Balance    Level of [place] (Supported Standing, Static Balance)  contact guard assist  -MW        Comment (Supported Standing, Static Balance)  Pt demonstrated a postural sway when examining static standing balance w/o using RW  -MW           Dynamic Standing Balance    Level of [place], Reaches Outside Midline (Standing, Dynamic Balance)  contact guard assist  -MW        Comment, Reaches Outside Midline (Standing, Dynamic Balance)  Pt accepted minimal perturbations and unable to reach outside of [place] w/o demonstrating increased postural sway and LOB   -MW           Sensory Assessment/Intervention    Sensory General Assessment  no sensation deficits identified  -MW           Positioning and Restraints    Pre-Treatment Position  in bed  -MW        Post Treatment Position  chair  -MW        In Chair  notified nsg;sitting;call light within reach;encouraged to call for assist  -MW           Pain Assessment    Additional Documentation  Pain Scale: Numbers Pre/Post-Treatment (Group)  -MW           Pain Scale: Numbers Pre/Post-Treatment    Pain Scale: Numbers, Pretreatment  2/10  -MW        Pain Scale: Numbers, Post-Treatment  -- no reported change  -MW        Pain Location - Orientation  lower  -MW        Pain Location  back  -MW        Pain Intervention(s)  Repositioned;Ambulation/increased activity  -MW           Plan of Care Review    Plan of Care Reviewed With  patient  -MW        Progress  no change  -MW           Clinical Impression (OT)    Date of Referral to OT  12/07/19  -MW        OT Diagnosis  decreased ADL  -MW        Prognosis (OT Eval)  good  -MW        Criteria for Skilled Therapeutic Interventions Met (OT Eval)  no;no problems  identified which require skilled intervention  -MW        Therapy Frequency (OT Eval)  evaluation only  -MW        Care Plan Review (OT)  evaluation/treatment results reviewed;care plan/treatment goals reviewed;risks/benefits reviewed;patient/other agree to care plan;current/potential barriers reviewed  -MW        Anticipated Discharge Disposition (OT)  home with assist;home with home health  -MW           Living Environment    Home Accessibility  tub/shower is not walk in;stairs to enter home  -MW          User Key  (r) = Recorded By, (t) = Taken By, (c) = Cosigned By    Initials Name Effective Dates    MW Sarai Shabazz [place], OTR/L 08/28/18 -               OT Recommendation and Plan  Outcome Summary/Treatment Plan (OT)  Anticipated Discharge Disposition (OT): home with assist  Therapy Frequency (OT Eval): evaluation only  Plan of Care Review  Plan of Care Reviewed With: patient  Plan of Care Reviewed With: patient  Outcome Summary: OT eval completed. Pt performs bed mobility with S. LB dressing in long sitting independently. Pt demos decreased balance with amb requiring CGA to maintain w use of RW. Pt appears at her baseline per obs performance and pt report. Will defer to PT for balance, OT to sign off. Anticipate d/c home w assist.     Rehab Goal Summary     Row Name 12/09/19 1314             Bed Mobility Goal 1 (PT)    Activity/Assistive Device (Bed Mobility Goal 1, PT)  bed mobility activities, all  (Pended)   -MB      [place] Level/Cues Needed (Bed Mobility Goal 1, PT)  independent  (Pended)   -MB      Time Frame (Bed Mobility Goal 1, PT)  long term goal (LTG)  (Pended)   -MB      Progress/Outcomes (Bed Mobility Goal 1, PT)  goal ongoing  (Pended)   -MB         Transfer Goal 1 (PT)    Activity/Assistive Device (Transfer Goal 1, PT)  sit-to-stand/stand-to-sit;walker, rolling  (Pended)   -MB      [place] Level/Cues Needed (Transfer Goal 1, PT)  contact guard assist  (Pended)   -MB      Time Frame  (Transfer Goal 1, PT)  long term goal (LTG)  (Pended)   -MB      Progress/Outcome (Transfer Goal 1, PT)  goal ongoing  (Pended)   -MB         Gait Training Goal 1 (PT)    Activity/Assistive Device (Gait Training Goal 1, PT)  gait (walking locomotion);assistive device use;decrease fall risk;improve balance and speed;increase endurance/gait distance;walker, rolling  (Pended)   -MB      [place] Level (Gait Training Goal 1, PT)  contact guard assist  (Pended)   -MB      Distance (Gait Goal 1, PT)  200 ft  (Pended)   -MB      Time Frame (Gait Training Goal 1, PT)  long term goal (LTG)  (Pended)   -MB      Progress/Outcome (Gait Training Goal 1, PT)  goal ongoing  (Pended)   -MB         Stairs Goal 1 (PT)    Activity/Assistive Device (Stairs Goal 1, PT)  ascending stairs;descending stairs;decrease fall risk;improve balance and speed;other (see comments)  (Pended)  w/ or w/o appropriate AD  -MB      [place] Level/Cues Needed (Stairs Goal 1, PT)  contact guard assist  (Pended)   -MB      Number of Stairs (Stairs Goal 1, PT)  4  (Pended)   -MB      Time Frame (Stairs Goal 1, PT)  long term goal (LTG)  (Pended)   -MB      Progress/Outcome (Stairs Goal 1, PT)  goal ongoing  (Pended)   -MB        User Key  (r) = Recorded By, (t) = Taken By, (c) = Cosigned By    Initials Name Provider Type Discipline    Mary Alice Nagel, PT Student PT Student PT          Outcome Measures     Row Name 12/09/19 1400             How much help from another is currently needed...    Putting on and taking off regular lower body clothing?  4  -MW      Bathing (including washing, rinsing, and drying)  3  -MW      Toileting (which includes using toilet bed pan or urinal)  4  -MW      Putting on and taking off regular upper body clothing  4  -MW      Taking care of personal grooming (such as brushing teeth)  4  -MW      Eating meals  4  -MW      AM-PAC 6 Clicks Score (OT)  23  -MW         Functional Assessment    Outcome Measure Options  AM-PAC  6 Clicks Daily Activity (OT)  -        User Key  (r) = Recorded By, (t) = Taken By, (c) = Cosigned By    Initials Name Provider Type    Sarai Blair OTR/L Occupational Therapist          Time Calculation:   Time Calculation- OT     Row Name 12/09/19 1443             Time Calculation- OT    OT Start Time  1320  -MW      OT Stop Time  1400  -MW      OT Time Calculation (min)  40 min  -MW      OT Received On  12/09/19  -        User Key  (r) = Recorded By, (t) = Taken By, (c) = Cosigned By    Initials Name Provider Type    Sarai Blair, OTR/L Occupational Therapist        Therapy Suggested Charges     Code   Minutes Charges    None           Therapy Charges for Today     Code Description Service Date Service Provider Modifiers Qty    [id number] HC OT EVAL LOW COMPLEXITY 3 12/9/2019 Sarai Shabazz OTR/L GO 1               OT Discharge Summary  Anticipated Discharge Disposition (OT): home with assist  Reason for Discharge: At baseline function  Outcomes Achieved: Other(eval only)  Discharge Destination: Home with assist    RADHA Martines/L  12/9/2019

## 2019-12-09 NOTE — PROGRESS NOTES
Continued Stay Note   [place]     Patient Name: Mini Gentile  MRN: [id number]  Today's Date: 12/9/2019    Admit Date: 12/6/2019    Discharge Plan     Row Name 12/09/19 1003       Plan    Plan  Referral sent to intake [facility]    Patient/Family in Agreement with Plan  yes    Plan Comments  Pt currently in dialysis so called unit and they asked pt about being set up for dialysis as outpt.  and she wishes to do so.  Confirmed by Suma Harrington from [facility] that pt lost chair time as she went 60 plus days without treatment.  Sent referral to Intake at [facility] to set up chair time, pt prefers [facility]. Will follow.     Row Name 12/09/19 4201       Plan    Plan  Home    Plan Comments  Pt is thinking she has lost her chair time with dialysis.  Left message with Suma Harrington with Intake to find out if this is the case and if so get one established. Informed that pt is ready for discharge. Will follow.         Discharge Codes    No documentation.             SACHI Douglas

## 2019-12-09 NOTE — PLAN OF CARE
Problem: Patient Care Overview  Goal: Plan of Care Review  Outcome: Ongoing (interventions implemented as appropriate)  Flowsheets  Taken 12/9/2019 0507  Progress: no change  Outcome Summary: No complaints of pain. No complaints of nasuea. Patient accidently pulled IV out. Dialysis scheduled for today. Waiting for dialysis chair placement. Cont to monitor.  Taken 12/8/2019 2000  Plan of Care Reviewed With: patient     Problem: Fall Risk (Adult)  Goal: Absence of Fall  Outcome: Ongoing (interventions implemented as appropriate)  Flowsheets (Taken 12/8/2019 0256 by Xena Rutherford RN)  Absence of Fall: making progress toward outcome     Problem: Hemodialysis (Adult)  Goal: Signs and Symptoms of Listed Potential Problems Will be Absent, Minimized or Managed (Hemodialysis)  Outcome: Ongoing (interventions implemented as appropriate)  Flowsheets  Taken 12/9/2019 0507 by Alyx Lr RN  Problems Assessed (Hemodialysis): all  Taken 12/8/2019 0256 by Xena Rutherford RN  Problems Present (Hemodialysis): nausea and vomiting;situational response     Problem: Kidney Disease, Chronic/End Stage Renal Disease (Adult)  Goal: Signs and Symptoms of Listed Potential Problems Will be Absent, Minimized or Managed (Kidney Disease, Chronic/End Stage Renal Disease)  Outcome: Ongoing (interventions implemented as appropriate)  Flowsheets (Taken 12/8/2019 0256 by Xena Rutherford RN)  Problems Assessed (Chronic Kidney Disease/ESRD): all  Problems Present (Chronic Kidney/ESRD): electrolyte imbalance;fluid imbalance;functional decline/self-care deficit     Problem: Pain, Acute (Adult)  Goal: Acceptable Pain Control/Comfort Level  Outcome: Ongoing (interventions implemented as appropriate)  Flowsheets (Taken 12/8/2019 0256 by Xena Rutherford RN)  Acceptable Pain Control/Comfort Level: making progress toward outcome

## 2019-12-09 NOTE — PLAN OF CARE
Problem: Patient Care Overview  Goal: Plan of Care Review  Outcome: Ongoing (interventions implemented as appropriate)  Flowsheets (Taken 12/8/2019 1815)  Progress: no change  Plan of Care Reviewed With: patient  Outcome Summary: Patient C/O lower extremity discomfort; PRN tylenol per orders. Gabapentin per orders. Dialysis scheduled for tomorrow. Zofran x1. Coreg started per orders for high blood pressures. Safety Maintained. Will continue to monitor.  Goal: Individualization and Mutuality  Outcome: Ongoing (interventions implemented as appropriate)  Goal: Discharge Needs Assessment  Outcome: Ongoing (interventions implemented as appropriate)  Goal: Interprofessional Rounds/Family Conf  Outcome: Ongoing (interventions implemented as appropriate)     Problem: Fall Risk (Adult)  Goal: Absence of Fall  Outcome: Ongoing (interventions implemented as appropriate)     Problem: Hemodialysis (Adult)  Goal: Signs and Symptoms of Listed Potential Problems Will be Absent, Minimized or Managed (Hemodialysis)  Outcome: Ongoing (interventions implemented as appropriate)     Problem: Kidney Disease, Chronic/End Stage Renal Disease (Adult)  Goal: Signs and Symptoms of Listed Potential Problems Will be Absent, Minimized or Managed (Kidney Disease, Chronic/End Stage Renal Disease)  Outcome: Ongoing (interventions implemented as appropriate)     Problem: Pain, Acute (Adult)  Goal: Acceptable Pain Control/Comfort Level  Outcome: Ongoing (interventions implemented as appropriate)

## 2019-12-09 NOTE — DISCHARGE PLACEMENT REQUEST
"Deisy Forte [phone] will like to be set up at [facility].   Mini Peña (59 y.o. Female)     Date of Birth Social Security Number Address Home Phone MRN    1960  [address] [place] [id number] [phone] [id number]    Yazidi Marital Status          Other        Admission Date Admission Type Admitting Provider Attending Provider Department, Room/Bed    12/6/19 Emergency Jaswant Villegas MD Thompson, Benjamin H, MD [facility] 4C, 462/1    Discharge Date Discharge Disposition Discharge Destination                       Attending Provider:  Jaswant Villegas MD    Allergies:  Bupropion, Chantix [Varenicline], Sulfa Antibiotics, Azithromycin, Levofloxacin, Penicillins    Isolation:  None   Infection:  None   Code Status:  CPR    Ht:  149.9 cm (59.02\")   Wt:  61.2 kg (134 lb 14.7 oz)    Admission Cmt:  None   Principal Problem:  None                Active Insurance as of 12/6/2019     Primary Coverage     Payor Plan Insurance Group Employer/Plan Group    MEDICARE MEDICARE A & B      Payor Plan Address Payor Plan Phone Number Payor Plan Fax Number Effective Dates    [address] [phone]  7/1/2017 - None Entered    [facility] [id number]       Subscriber Name Subscriber Birth Date Member ID       MINI PEÑA 1960 [id number]           Secondary Coverage     Payor Plan Insurance Group Employer/Plan Group    [employer]ICA[place]      Payor Plan Address Payor Plan Phone Number Payor Plan Fax Number Effective Dates    [address] [address] [phone]  11/4/2016 - None Entered    [facility] [id number]       Subscriber Name Subscriber Birth Date Member ID       MINI PEÑA 1960 [id number]                 Emergency Contacts      (Rel.) Home Phone Work Phone Mobile Phone    Erica Bowling (Daughter) -- -- [phone]               History & Physical      Fuentes Messina MD at 12/06/19 1801      " "        [facility] Medicine Services  HISTORY AND PHYSICAL    Date of Admission: 2019  Primary Care Physician: Ivan Kelly MD    Subjective     Chief Complaint: shortness of breath    History of Present Illness    Mrs. Gentile is a 60 yo F with a past medical history of ESRD due to DM and hypertensive kidney disease.  Patient was supposed to be on dialysis three times weekly but has not had dialysis since late 2019.  Patient still makes urine.  Patient has been having worsening exertional dyspnea, orthopnea, and PND.  Patient indicates that she stopped dialysis due to disagreements with the dialysis \"people\"    Patient was seen and evaluated while on dialysis.    Review of Systems   Constitutional: Positive for activity change, fatigue and unexpected weight change. Negative for chills and fever.   Respiratory: Positive for shortness of breath. Negative for cough.    Cardiovascular: Positive for leg swelling. Negative for chest pain and palpitations.   Gastrointestinal: Negative for abdominal distention, abdominal pain, diarrhea, nausea and vomiting.   Genitourinary: Positive for decreased urine volume.   All other systems reviewed and are negative.       Otherwise complete ROS reviewed and negative except as mentioned in the HPI.    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 Her smoking use included cigarettes. She smoked 2.00 packs per day. She has never used smokeless tobacco. She reports that she uses drugs. Drug: Marijuana. Frequency: 1.00 time per week. She reports that she does not drink alcohol.    Family History: family history includes Heart disease in her mother.       Allergies:  Allergies   Allergen Reactions   • Bupropion Nausea Only   • Chantix [Varenicline]    • Sulfa Antibiotics    • Azithromycin Rash   • Levofloxacin Rash   • Penicillins Rash     Medications:  Prior to Admission medications    Medication Sig Start Date End Date Taking? Authorizing Provider   aspirin 81 MG EC tablet Take 81 mg by mouth Daily.    Steve Warren MD   benzonatate (TESSALON) 100 MG capsule Take 1 capsule by mouth 3 (Three) Times a Day As Needed for Cough. 9/5/19   Guru Zacarias APRN   brimonidine (ALPHAGAN) 0.2 % ophthalmic solution Administer 1 drop into the left eye 3 (Three) Times a Day.    Steve Warren MD   calcitriol (ROCALTROL) 0.25 MCG capsule Take 0.25 mcg by mouth Take As Directed. Only given on dialysis days (Tuesday, Thursday, Saturday)    Steve Warren MD   cetirizine (zyrTEC) 10 MG tablet Take 10 mg by mouth At Night As Needed (allergies).    Steve Warren MD   cholecalciferol (VITAMIN D3) 1000 units tablet Take 1,000 Units by mouth Daily.    Steve Warren MD   dorzolamide (TRUSOPT) 2 % ophthalmic solution Administer 1 drop into the left eye 2 (Two) Times a Day.    Steve Warren MD   famotidine (PEPCID) 20 MG tablet Take 20 mg by mouth 2 (Two) Times a Day.    Steve Warren MD   fluticasone (FLONASE) 50 MCG/ACT nasal spray 1 spray into the nostril(s) as directed by provider 2 (Two) Times a Day.    Steve Warren MD   guaiFENesin (MUCINEX) 600 MG 12 hr tablet Take 2 tablets by mouth Every 12 (Twelve) Hours. 9/5/19   Guru Zacarias APRN  "  homatropine 5 % ophthalmic solution Administer 1 drop into the left eye Daily.    Steve Warren MD   insulin glargine (LANTUS) 100 UNIT/ML injection Inject 10 Units under the skin Every Night.    Steve Warren MD   lidocaine-prilocaine (EMLA) 2.5-2.5 % cream Apply 1 application topically to the appropriate area as directed Take As Directed. Apply to access area 30 minutes prior to dialysis.     Steve Warren MD   losartan (COZAAR) 25 MG tablet Take 25 mg by mouth Daily.    tSeve Warren MD   midodrine (PROAMATINE) 5 MG tablet Take 5 mg by mouth Take As Directed. as needed for low blood pressure during dialysis.    Steve Warren MD   Netarsudil Dimesylate (RHOPRESSA) 0.02 % solution Administer 1 drop into the left eye Every Night.    Steve Warren MD   pravastatin (PRAVACHOL) 10 MG tablet Take 10 mg by mouth Every Night.    Steve Warren MD   predniSONE (DELTASONE) 20 MG tablet  Take 4 tablets by mouth daily for 3 days, 2 tablets daily for 3 days, 1 tablet daily for 3 days then stop 9/5/19   Guru Zacarias APRN   sertraline (ZOLOFT) 25 MG tablet Take 25 mg by mouth Every Night.    Steve Warren MD   sevelamer (RENAGEL) 800 MG tablet Take 1,600 mg by mouth 3 (Three) Times a Day With Meals.    Steve Warren MD   timolol (TIMOPTIC) 0.5 % ophthalmic solution Administer 1 drop into the left eye Every Morning.    Steve Warren MD   travoprost, BAK free, (TRAVATAN Z) 0.004 % solution ophthalmic solution Administer 1 drop into the left eye Every Night.    Steve Warren MD     Objective     Vital Signs: /71   Pulse 95   Temp 97.8 °F (36.6 °C) (Oral)   Resp 16   Ht 149.9 cm (59\")   Wt 61.2 kg (135 lb)   SpO2 96%   BMI 27.27 kg/m²    Physical Exam   Constitutional: She is oriented to person, place, and time. No distress.   HENT:   Head: Normocephalic and atraumatic.   Eyes: Conjunctivae are normal.   Neck: Neck supple. No " JVD present.   Cardiovascular: Normal rate and regular rhythm.   Murmur heard.  Pulmonary/Chest: Effort normal. She has rales.   Abdominal: Soft. Bowel sounds are normal. She exhibits no distension. There is tenderness.   Musculoskeletal: She exhibits edema.   Neurological: She is alert and oriented to person, place, and time.   Skin: Skin is warm and dry. She is not diaphoretic. No erythema.   Psychiatric: She has a normal mood and affect. Her behavior is normal.   Nursing note and vitals reviewed.          Results Reviewed:  Lab Results (last 24 hours)     Procedure Component Value Units Date/Time    POC Occult Blood Stool [[id number]]  (Normal) Collected:  12/06/19 1250    Specimen:  Stool Updated:  12/06/19 1330     Fecal Occult Blood Negative     Lot Number [id number]     Expiration Date 05/31/2020     DEVELOPER LOT NUMBER [id number]     DEVELOPER EXPIRATION DATE 05/31/2020     Positive Control Positive     Negative Control Negative    BNP [[id number]]  (Abnormal) Collected:  12/06/19 1144    Specimen:  Blood Updated:  12/06/19 1301     proBNP 40,279.0 pg/mL     Narrative:       Among patients with dyspnea, NT-proBNP is highly sensitive for the detection of acute congestive heart failure. In addition NT-proBNP of <300 pg/ml effectively rules out acute congestive heart failure with 99% negative predictive value.    Troponin [[id number]]  (Abnormal) Collected:  12/06/19 1144    Specimen:  Blood Updated:  12/06/19 1255     Troponin T 0.047 ng/mL     Narrative:       Troponin T Reference Range:  <= 0.03 ng/mL-   Negative for AMI  >0.03 ng/mL-     Abnormal for myocardial necrosis.  Clinicians would have to utilize clinical acumen, EKG, Troponin and serial changes to determine if it is an Acute Myocardial Infarction or myocardial injury due to an underlying chronic condition.     [place] Draw [[id number]] Collected:  12/06/19 1144    Specimen:  Blood Updated:  12/06/19 1245    Narrative:       The following orders were created for  panel order [place] Draw.  Procedure                               Abnormality         Status                     ---------                               -----------         ------                     Light Blue Top[[id number]]                                   Final result               Green Top (Gel)[[id number]]                                  Final result               Lavender Top[[id number]]                                     Final result               Red Top[[id number]]                                          Final result                 Please view results for these tests on the individual orders.    Light Blue Top [[id number]] Collected:  12/06/19 1144    Specimen:  Blood Updated:  12/06/19 1245     Extra Tube hold for add-on     Comment: Auto resulted       Green Top (Gel) [[id number]] Collected:  12/06/19 1144    Specimen:  Blood Updated:  12/06/19 1245     Extra Tube Hold for add-ons.     Comment: Auto resulted.       Lavender Top [[id number]] Collected:  12/06/19 1144    Specimen:  Blood Updated:  12/06/19 1245     Extra Tube hold for add-on     Comment: Auto resulted       Red Top [[id number]] Collected:  12/06/19 1144    Specimen:  Blood Updated:  12/06/19 1245     Extra Tube Hold for add-ons.     Comment: Auto resulted.       Protime-INR [[id number]]  (Abnormal) Collected:  12/06/19 1144    Specimen:  Blood Updated:  12/06/19 1241     Protime 15.5 Seconds      INR 1.19    Comprehensive Metabolic Panel [[id number]]  (Abnormal) Collected:  12/06/19 1144    Specimen:  Blood Updated:  12/06/19 1229     Glucose 72 mg/dL       mg/dL      Creatinine 14.18 mg/dL      Sodium 147 mmol/L      Potassium 5.3 mmol/L      Chloride 110 mmol/L      CO2 8.0 mmol/L      Calcium 9.3 mg/dL      Total Protein 7.6 g/dL      Albumin 4.40 g/dL      ALT (SGPT) 9 U/L      AST (SGOT) 14 U/L      Alkaline Phosphatase 66 U/L      Total Bilirubin 0.3 mg/dL      eGFR Non African Amer 3 mL/min/1.73      Comment: <15 Indicative of  kidney failure.        eGFR   Amer --     Comment: <15 Indicative of kidney failure.        Globulin 3.2 gm/dL      A/G Ratio 1.4 g/dL      BUN/Creatinine Ratio 8.7     Anion Gap 29.0 mmol/L     Narrative:       GFR Normal >60  Chronic Kidney Disease <60  Kidney Failure <15    CBC & Differential [[id number]] Collected:  12/06/19 1144    Specimen:  Blood Updated:  12/06/19 1159    Narrative:       The following orders were created for panel order CBC & Differential.  Procedure                               Abnormality         Status                     ---------                               -----------         ------                     CBC Auto Differential[[id number]]        Abnormal            Final result                 Please view results for these tests on the individual orders.    CBC Auto Differential [[id number]]  (Abnormal) Collected:  12/06/19 1144    Specimen:  Blood Updated:  12/06/19 1159     WBC 4.94 10*3/mm3      RBC 2.47 10*6/mm3      Hemoglobin 7.9 g/dL      Hematocrit 23.8 %      MCV 96.4 fL      MCH 32.0 pg      MCHC 33.2 g/dL      RDW 14.5 %      RDW-SD 51.3 fl      MPV 9.2 fL      Platelets 278 10*3/mm3      Neutrophil % 67.1 %      Lymphocyte % 21.5 %      Monocyte % 5.9 %      Eosinophil % 4.5 %      Basophil % 0.6 %      Immature Grans % 0.4 %      Neutrophils, Absolute 3.32 10*3/mm3      Lymphocytes, Absolute 1.06 10*3/mm3      Monocytes, Absolute 0.29 10*3/mm3      Eosinophils, Absolute 0.22 10*3/mm3      Basophils, Absolute 0.03 10*3/mm3      Immature Grans, Absolute 0.02 10*3/mm3      nRBC 0.4 /100 WBC         Imaging Results (Last 24 Hours)     Procedure Component Value Units Date/Time    XR Chest 1 View [[id number]] Collected:  12/06/19 1332     Updated:  12/06/19 1339    Narrative:       XR CHEST 1 VW- 12/6/2019 1:13 PM CST     HISTORY: dyspnea       COMPARISON: Chest exam dated 09/03/2019.     FINDINGS:   Fairly extensive bibasilar lung consolidation with obscuration of  "both  hemidiaphragms and what appears to be a trace layering left pleural  effusion. Additional mild patchy infiltrate in the paramedian right  upper lobe. Bilateral diffuse coarsened interstitial markings with what  appears to be subpleural Kerley B lines in the right base. Underlying  mild cardiomegaly. No pneumothorax. Incidentally noted vascular stent in  the region of the left brachiocephalic vein. No acute bony abnormality.        Impression:       1. Bilateral interstitial and basilar consolidative infiltrates. There  appear to be subpleural Kerley B lines in the right base, suggesting a  volume overload with interstitial and casey pulmonary edema. This seems  less likely infectious.        This report was finalized on 12/06/2019 13:35 by Dr Jhonatan Berry, .        I have personally reviewed and interpreted the radiology studies and ECG obtained at time of admission.     Assessment / Plan     Assessment:   Active Hospital Problems    Diagnosis   • Anemia due to chronic kidney disease, on chronic dialysis (CMS/HCC)   • Acute pulmonary edema (CMS/HCC)   • Non-compliance with renal dialysis (CMS/HCC)   • Elevated troponin due to ESRD    • Hyperkalemia   • Encephalopathy, metabolic, due to uremia   • High anion gap metabolic acidosis due to uremia   • Type 2 diabetes mellitus, with long-term current use of insulin (CMS/HCC)   • End stage renal failure on dialysis (CMS/HCC)   • Diabetic nephropathy (CMS/HCC)   • HTN (hypertension)         Plan:   1.  Admit to medicine  2.  Consult nephrology  3.  Dialysis per nephrology  4.  Resume home medications as appropriate  5.  Heparin DVT PPx  6.  Echo   7.  SW consult for \"home dialysis\" per patient request  8.  AM renal function   9.  Resume home medications when appropriate, patient unable to reconcile medication as she is encephalopathic      Code Status: Full    Sonreymundo to make decisions if patient unable     I discussed the patient's findings and my " recommendations with the patient    Estimated length of stay 2-4    Fuentes Messina MD   12/06/19   6:01 PM            Electronically signed by Fuentes Messina MD at 12/06/19 1952       Operative/Procedure Notes (last 24 hours) (Notes from 12/08/19 0956 through 12/09/19 0956)    No notes of this type exist for this encounter.            Physician Progress Notes (last 24 hours) (Notes from 12/08/19 0956 through 12/09/19 0956)      Gomez Lemos, ANTHONY at 12/09/19 0855          Nephrology ([facility]) Progress Note      Patient:  Mini Gentile  YOB: 1960  Date of Service: 12/9/2019  MRN: [id number]   Acct: [id number]   Primary Care Physician: Ivan Kelly MD  Advance Directive:   Code Status and Medical Interventions:   Ordered at: 12/06/19 1408     Level Of Support Discussed With:    Patient     Code Status:    CPR     Medical Interventions (Level of Support Prior to Arrest):    Full     Admit Date: 12/6/2019       Hospital Day: 3  Referring Provider: Fuentes Messina MD      Patient personally seen and examined.  Complete chart including Consults, Notes, Operative Reports, Labs, Cardiology, and Radiology studies reviewed as able.        Subjective:  Mini Gentile is a 59 y.o. female  whom we were consulted for end stage renal disease.  Was previously on chronic hemodialysis at [facility].  Patient decided to quit dialysis in September.  History of type 2 diabetes, hypertension.  Presented to emergency department with dyspnea, confusion, and malaise; agreed to resume dialysis. Had urgent hemodialysis on 12/06; second treatment on 12/07; tolerated well and overall condition improved post-dialysis.    Today is seen during dialysis. No new overnight issues.  Dialysis   Pt was seen on RRT; tolerating well  Modality: Hemodialysis  Access: Arterial Venous Fistula  Location: left upper  QB: 450  QD: 700  UF:  2000    Allergies:  Bupropion; Chantix [varenicline]; Sulfa antibiotics; Azithromycin; Levofloxacin; and Penicillins    Home Meds:  Medications Prior to Admission   Medication Sig Dispense Refill Last Dose   • aspirin 81 MG EC tablet Take 81 mg by mouth Daily.   12/5/2019 at Unknown time   • benzonatate (TESSALON) 100 MG capsule Take 1 capsule by mouth 3 (Three) Times a Day As Needed for Cough. 20 capsule 0 12/5/2019 at Unknown time   • brimonidine (ALPHAGAN) 0.2 % ophthalmic solution Administer 1 drop into the left eye 3 (Three) Times a Day.   12/5/2019 at Unknown time   • calcitriol (ROCALTROL) 0.25 MCG capsule Take 0.25 mcg by mouth Take As Directed. Only given on dialysis days (Tuesday, Thursday, Saturday)   12/5/2019 at Unknown time   • cetirizine (zyrTEC) 10 MG tablet Take 10 mg by mouth At Night As Needed (allergies).   12/5/2019 at Unknown time   • cholecalciferol (VITAMIN D3) 1000 units tablet Take 1,000 Units by mouth Daily.   12/5/2019 at Unknown time   • dorzolamide (TRUSOPT) 2 % ophthalmic solution Administer 1 drop into the left eye 2 (Two) Times a Day.   12/5/2019 at Unknown time   • famotidine (PEPCID) 20 MG tablet Take 20 mg by mouth 2 (Two) Times a Day.   12/5/2019 at Unknown time   • fluticasone (FLONASE) 50 MCG/ACT nasal spray 1 spray into the nostril(s) as directed by provider 2 (Two) Times a Day.   12/5/2019 at Unknown time   • guaiFENesin (MUCINEX) 600 MG 12 hr tablet Take 2 tablets by mouth Every 12 (Twelve) Hours.   12/5/2019 at Unknown time   • homatropine 5 % ophthalmic solution Administer 1 drop into the left eye Daily.   12/5/2019 at Unknown time   • insulin glargine (LANTUS) 100 UNIT/ML injection Inject 10 Units under the skin Every Night.   12/5/2019 at Unknown time   • lidocaine-prilocaine (EMLA) 2.5-2.5 % cream Apply 1 application topically to the appropriate area as directed Take As Directed. Apply to access area 30 minutes prior to dialysis.    12/5/2019 at Unknown time   •  losartan (COZAAR) 25 MG tablet Take 50 mg by mouth Daily.   12/6/2019 at Unknown time   • midodrine (PROAMATINE) 5 MG tablet Take 5 mg by mouth Take As Directed. as needed for low blood pressure during dialysis.   12/5/2019 at Unknown time   • Netarsudil Dimesylate (RHOPRESSA) 0.02 % solution Administer 1 drop into the left eye Every Night.   12/5/2019 at Unknown time   • pravastatin (PRAVACHOL) 10 MG tablet Take 10 mg by mouth Every Night.   12/5/2019 at Unknown time   • sertraline (ZOLOFT) 25 MG tablet Take 25 mg by mouth Every Night.   12/5/2019 at Unknown time   • sevelamer (RENAGEL) 800 MG tablet Take 1,600 mg by mouth 3 (Three) Times a Day With Meals.   12/5/2019 at Unknown time   • timolol (TIMOPTIC) 0.5 % ophthalmic solution Administer 1 drop into the left eye Every Morning.   12/5/2019 at Unknown time   • travoprost, BAK free, (TRAVATAN Z) 0.004 % solution ophthalmic solution Administer 1 drop into the left eye Every Night.   12/5/2019 at Unknown time   • predniSONE (DELTASONE) 20 MG tablet  Take 4 tablets by mouth daily for 3 days, 2 tablets daily for 3 days, 1 tablet daily for 3 days then stop 11 tablet 0        Medicines:  Current Facility-Administered Medications   Medication Dose Route Frequency Provider Last Rate Last Dose   • acetaminophen (TYLENOL) tablet 650 mg  650 mg Oral Q4H PRN Fuentes Messina MD   650 mg at 12/08/19 0810    Or   • acetaminophen (TYLENOL) 160 MG/5ML solution 650 mg  650 mg Oral Q4H PRN Fuentes Messina MD        Or   • acetaminophen (TYLENOL) suppository 650 mg  650 mg Rectal Q4H PRN Fuentes Messina MD       • calcitriol (ROCALTROL) capsule 0.25 mcg  0.25 mcg Oral Daily Ten Boyd MD   0.25 mcg at 12/08/19 1516   • calcium acetate (PHOSLO) capsule 667 mg  667 mg Oral TID With Meals Ten Boyd MD   667 mg at 12/08/19 1714   • carvedilol (COREG) tablet 6.25 mg  6.25 mg Oral BID With Meals Fuentes Messina MD   6.25 mg at 12/08/19 5250   •  dextrose (D50W) 25 g/ 50mL Intravenous Solution 25 g  25 g Intravenous Q15 Min PRN Fuentes Messina MD       • dextrose (GLUTOSE) oral gel 15 g  15 g Oral Q15 Min PRN Fuentes Messina MD       • epoetin vika-epbx (RETACRIT) injection 10,000 Units  10,000 Units Subcutaneous Once per day on Mon Wed Fri Ten Boyd MD       • gabapentin (NEURONTIN) capsule 100 mg  100 mg Oral Q12H Federico Clancy,    100 mg at 12/08/19 2020   • glucagon (human recombinant) (GLUCAGEN DIAGNOSTIC) injection 1 mg  1 mg Subcutaneous Q15 Min PRN Fuentes Messina MD       • heparin (porcine) 5000 UNIT/ML injection 5,000 Units  5,000 Units Subcutaneous Q8H Fuentes Messina MD   5,000 Units at 12/09/19 0641   • Influenza Vac Subunit Quad (FLUCELVAX) injection 0.5 mL  0.5 mL Intramuscular During Hospitalization Fuentes Messina MD       • insulin lispro (humaLOG) injection 2-7 Units  2-7 Units Subcutaneous 4x Daily With Meals & Nightly Fuentes Messina MD   3 Units at 12/08/19 2030   • ondansetron (ZOFRAN) tablet 4 mg  4 mg Oral Q6H PRN Fuentes Messina MD        Or   • ondansetron (ZOFRAN) injection 4 mg  4 mg Intravenous Q6H PRN Fuentes Messina MD   4 mg at 12/08/19 1316   • sodium chloride 0.9 % flush 10 mL  10 mL Intravenous PRN Daniela Esteban APRN       • sodium chloride 0.9 % flush 10 mL  10 mL Intravenous Q12H Fuentes Messina MD   10 mL at 12/08/19 2020   • sodium chloride 0.9 % flush 10 mL  10 mL Intravenous PRN Fuentes Messina MD           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    • Chronic otitis media    • Diabetes (CMS/HCC)    • Eustachian tube dysfunction    • Glaucoma    • HTN (hypertension)    • Kidney failure     on dialysis   • Liver disorder    • Mucoid otitis media        Past Surgical History:  Past Surgical History:   Procedure Laterality Date   • ARTERIOVENOUS FISTULA     • CATARACT  "EXTRACTION WITH INTRAOCULAR LENS IMPLANT     •  SECTION     • CHOLECYSTECTOMY     • MYRINGOTOMY W/ TUBES Bilateral    • MYRINGOTOMY W/ TUBES Right    • TUBAL ABDOMINAL LIGATION         Family History  Family History   Problem Relation Age of Onset   • Heart disease Mother        Social History  Social History     Socioeconomic History   • Marital status:      Spouse name: Not on file   • Number of children: Not on file   • Years of education: Not on file   • Highest education level: Not on file   Tobacco Use   • Smoking status: Current Every Day Smoker     Packs/day: 0.50     Years: 6.00     Pack years: 3.00     Types: Cigarettes   • Smokeless tobacco: Never Used   Substance and Sexual Activity   • Alcohol use: No   • Drug use: No   • Sexual activity: Defer         Review of Systems:  History obtained from chart review and the patient  General ROS: No fever or chills  Respiratory ROS: No cough, shortness of breath, wheezing  Cardiovascular ROS: No chest pain or palpitations  Gastrointestinal ROS: No abdominal pain or melena  Genito-Urinary ROS: No dysuria or hematuria  Neurological ROS: no headache or dizziness    Objective:  Patient Vitals for the past 24 hrs:   BP Temp Temp src Pulse Resp SpO2 Height Weight   19 0727 117/63 98 °F (36.7 °C) Oral 76 18 100 % -- --   19 0414 149/63 97.7 °F (36.5 °C) Oral 62 16 97 % -- --   19 1951 109/52 98.1 °F (36.7 °C) Oral 71 14 97 % -- --   19 1500 112/48 98 °F (36.7 °C) Oral 77 18 96 % -- --   19 1206 136/52 -- -- 91 -- -- -- --   19 0907 -- -- -- -- -- -- 149.9 cm (59.02\") 61.2 kg (134 lb 14.7 oz)     No intake or output data in the 24 hours ending 19 0855  General: awake/alert    Neck: supple, no JVD  Chest:  clear to auscultation bilaterally without respiratory distress  CVS: regular rate and rhythm  Abdominal: soft, nontender, positive bowel sounds  Extremities: no cyanosis or edema  Skin: warm and dry without " rash  Neuro: no focal motor deficits    Labs:  Results from last 7 days   Lab Units 12/06/19  1144   WBC 10*3/mm3 4.94   HEMOGLOBIN g/dL 7.9*   HEMATOCRIT % 23.8*   PLATELETS 10*3/mm3 278         Results from last 7 days   Lab Units 12/09/19  0359 12/08/19  0520 12/07/19  0345 12/06/19  1144   SODIUM mmol/L 139 138 138 147*   POTASSIUM mmol/L 4.2 4.4 3.2* 5.3*   CHLORIDE mmol/L 96* 97* 94* 110*   CO2 mmol/L 24.0 24.0 23.0 8.0*   BUN mg/dL 31* 18 35* 123*   CREATININE mg/dL 6.69* 4.50* 6.54* 14.18*   CALCIUM mg/dL 9.0 9.0 8.9 9.3   BILIRUBIN mg/dL  --   --   --  0.3   ALK PHOS U/L  --   --   --  66   ALT (SGPT) U/L  --   --   --  9   AST (SGOT) U/L  --   --   --  14   GLUCOSE mg/dL 100* 118* 116* 72       Radiology:   Imaging Results (Last 72 Hours)     Procedure Component Value Units Date/Time    XR Chest 1 View [[id number]] Collected:  12/06/19 1332     Updated:  12/06/19 1339    Narrative:       XR CHEST 1 VW- 12/6/2019 1:13 PM CST     HISTORY: dyspnea       COMPARISON: Chest exam dated 09/03/2019.     FINDINGS:   Fairly extensive bibasilar lung consolidation with obscuration of both  hemidiaphragms and what appears to be a trace layering left pleural  effusion. Additional mild patchy infiltrate in the paramedian right  upper lobe. Bilateral diffuse coarsened interstitial markings with what  appears to be subpleural Kerley B lines in the right base. Underlying  mild cardiomegaly. No pneumothorax. Incidentally noted vascular stent in  the region of the left brachiocephalic vein. No acute bony abnormality.        Impression:       1. Bilateral interstitial and basilar consolidative infiltrates. There  appear to be subpleural Kerley B lines in the right base, suggesting a  volume overload with interstitial and casey pulmonary edema. This seems  less likely infectious.        This report was finalized on 12/06/2019 13:35 by Dr Jhonatan Berry, .          Culture:  No results found for: BLOODCX, URINECX, WOUNDCX, MRSACX,  RESPCX, STOOLCX      Assessment   1.  End stage renal disease on hemodialysis  2.  Type 2 diabetes with nephropathy  3.  Essential hypertension  4.  Metabolic acidosis  5.  Hyperkalemia  6.  Anemia of CKD  7.  Medical noncompliance    Plan:  1.  Dialysis today  2.  Need to verify if patient still has outpatient chair time; if not, will need to arrange prior to discharge.  3.  Monitor labs      ANTHONY Reid  12/9/2019  8:55 AM      Electronically signed by Gomez Lemos APRN at 12/09/19 0903     Ten Boyd MD at 12/08/19 1314           PROGRESS NOTE.      Patient:  Mini Gentile  YOB: 1960  Date of Service: 12/8/2019  MRN: [id number]   Acct: [id number]   Primary Care Physician: Ivan Kelly MD  Advance Directive:   Code Status and Medical Interventions:   Ordered at: 12/06/19 1408     Level Of Support Discussed With:    Patient     Code Status:    CPR     Medical Interventions (Level of Support Prior to Arrest):    Full     Admit Date: 12/6/2019       Hospital Day: 2  Referring Provider: Fuentes Messina MD      Patient Seen, Chart, Consults, Notes, Labs, Radiology studies reviewed.        Subjective:  Mini Gentile is a 59 y.o. female  whom we were consulted for end stage renal disease management. Patient has previously been on chronic maintenance hemodialysis at [facility] dialysis unit. She has decided then to quit in September 2019.  She has benign hypertension and type 2 diabetes complicated with diabetic nephropathy.  She has an arteriovenous fistula in her left upper extremity.  Patient presented to the emergency room today complaining of increasing shortness of breath and malaise.  She was agreeing to restart dialysis.  Her bun was 123 and her serum creatinine was 14.1. She denies dysuria, fever, nausea, vomiting and diarrhea.   Today, she is status post dialysis on 12/7.  She was complaining of nausea and vomiting.    Review of  "Systems:  History obtained from chart review and the patient  General ROS: No fever or chills  Respiratory ROS: No cough, shortness of breath, wheezing  Cardiovascular ROS: no chest pain or dyspnea on exertion  Gastrointestinal ROS: No abdominal pain or melena  Genito-Urinary ROS: No dysuria or hematuria  Musculoskeletal: negative  Skin: negative    Objective:  /52   Pulse 91   Temp 97.9 °F (36.6 °C) (Oral)   Resp 18   Ht 149.9 cm (59.02\")   Wt 61.2 kg (134 lb 14.7 oz)   SpO2 91%   BMI 27.24 kg/m²      Intake/Output Summary (Last 24 hours) at 12/8/2019 1314  Last data filed at 12/8/2019 0300  Gross per 24 hour   Intake --   Output 100 ml   Net -100 ml       Physical examination:  General: awake/alert   Chest:  clear to auscultation bilaterally without respiratory distress  CVS: regular rate and rhythm  Abdominal: soft, nontender, normal bowel sounds  Extremities: no cyanosis or edema  Skin: warm and dry without rash  Neuro: No focal motor deficits    Labs:  Lab Results (last 24 hours)     Procedure Component Value Units Date/Time    Renal Function Panel [[id number]]  (Abnormal) Collected:  12/08/19 0520    Specimen:  Blood Updated:  12/08/19 0559     Glucose 118 mg/dL      BUN 18 mg/dL      Creatinine 4.50 mg/dL      Sodium 138 mmol/L      Potassium 4.4 mmol/L      Chloride 97 mmol/L      CO2 24.0 mmol/L      Calcium 9.0 mg/dL      Albumin 3.90 g/dL      Phosphorus 5.7 mg/dL      Anion Gap 17.0 mmol/L      BUN/Creatinine Ratio 4.0     eGFR Non African Amer 10 mL/min/1.73      Comment: <15 Indicative of kidney failure.        eGFR   Amer --     Comment: <15 Indicative of kidney failure.       Narrative:       GFR Normal >60  Chronic Kidney Disease <60  Kidney Failure <15            Radiology:   Imaging Results (Last 24 Hours)     ** No results found for the last 24 hours. **              Assessment   -End-stage renal disease  -Type 2 diabetes with renal disease  -Benign " hypertension  -Noncompliance  -Metabolic acidosis  -Hyperkalemia  -Anemia of chronic kidney disease  -Nausea-vomiting      Plan:  Next dialysis is due on 12/9.  Follow-up labs.  Arrange outpatient dialysis placement at [place].      Ten Boyd MD  12/8/2019  1:14 PM    Electronically signed by Ten Boyd MD at 12/08/19 1315     Fuentes Messina MD at 12/08/19 1026              [facility] Medicine Services  INPATIENT PROGRESS NOTE    Patient Name: Mini Gentile  Date of Admission: 12/6/2019  Today's Date: 12/08/19  Length of Stay: 2  Primary Care Physician: Ivan Kelly MD    Subjective   Chief Complaint: nausea  HPI     Patient seen and examined at bedside.  Patient overall doing well.  Patient had a little bit of nausea with emesis this morning.  Patient noted to have hypertension.  Patient indicates that she has been kicked out of her dialysis chair, and she needs reinstituted into a new dialysis chair.  Patient indicates that she is interested in home dialysis.  But she understands that this will take time to get set up if it is even feasible.  Patient will need to be reinitiated with a dialysis chair.        Review of Systems   Constitutional: Positive for fatigue. Negative for activity change, appetite change, chills, diaphoresis and fever.   Respiratory: Negative for cough and shortness of breath.    Cardiovascular: Negative for chest pain and palpitations.   Gastrointestinal: Positive for nausea and vomiting. Negative for abdominal distention, abdominal pain and constipation.   Neurological: Positive for weakness.        All pertinent negatives and positives are as above. All other systems have been reviewed and are negative unless otherwise stated.     Objective    Temp:  [97.9 °F (36.6 °C)-98 °F (36.7 °C)] 97.9 °F (36.6 °C)  Heart Rate:  [88-95] 95  Resp:  [16-20] 18  BP: (131-182)/(54-67) 182/62  Physical  Exam  Constitutional: She is oriented to person, place, and time. No distress.   HENT:   Head: Normocephalic and atraumatic.   Eyes: Conjunctivae are normal. No scleral icterus.   Neck: Neck supple. No JVD present.   Cardiovascular: Normal rate, regular rhythm and intact distal pulses.   Murmur heard.  Pulmonary/Chest: Effort normal and breath sounds normal. No stridor. No respiratory distress. She has no wheezes.  No rales    Abdominal: Soft. Bowel sounds are normal. She exhibits no distension and no mass. There is no tenderness. There is no guarding.   Musculoskeletal: She exhibits edema.   Neurological: She is alert and oriented to person, place, and time.   Skin: Skin is warm and dry. She is not diaphoretic. No erythema.   Psychiatric: She has a normal mood and affect. Her behavior is normal.   Nursing note and vitals reviewed.      Results Review:  I have reviewed the labs, radiology results, and diagnostic studies.    Laboratory Data:   Results from last 7 days   Lab Units 12/06/19  1144   WBC 10*3/mm3 4.94   HEMOGLOBIN g/dL 7.9*   HEMATOCRIT % 23.8*   PLATELETS 10*3/mm3 278        Results from last 7 days   Lab Units 12/08/19  0520 12/07/19  0345 12/06/19  1144   SODIUM mmol/L 138 138 147*   POTASSIUM mmol/L 4.4 3.2* 5.3*   CHLORIDE mmol/L 97* 94* 110*   CO2 mmol/L 24.0 23.0 8.0*   BUN mg/dL 18 35* 123*   CREATININE mg/dL 4.50* 6.54* 14.18*   CALCIUM mg/dL 9.0 8.9 9.3   BILIRUBIN mg/dL  --   --  0.3   ALK PHOS U/L  --   --  66   ALT (SGPT) U/L  --   --  9   AST (SGOT) U/L  --   --  14   GLUCOSE mg/dL 118* 116* 72       Culture Data:   No results found for: BLOODCX, URINECX, WOUNDCX, MRSACX, RESPCX, STOOLCX    Radiology Data:   Imaging Results (Last 24 Hours)     ** No results found for the last 24 hours. **          I have reviewed the patient's current medications.     Assessment/Plan     Active Hospital Problems    Diagnosis   • Anemia due to chronic kidney disease, on chronic dialysis (CMS/HCC)   • Acute  "pulmonary edema (CMS/[facility])   • Non-compliance with renal dialysis (CMS/[facility])   • Elevated troponin due to ESRD    • Hyperkalemia   • Encephalopathy, metabolic, due to uremia   • High anion gap metabolic acidosis due to uremia   • Type 2 diabetes mellitus, with long-term current use of insulin (CMS/[facility])   • End stage renal failure on dialysis (CMS/[facility])   • Diabetic nephropathy (CMS/[facility])   • HTN (hypertension)       Plan:  1.  Received dialysis 12/6, 12/7 - Continue dialysis per nephrology  2.  Edema seems to resolved  3.  Counseled on compliance  4.  Resume home medicatoins as appropriate  5.  Heparin DVT PPx  6.  Echo  pending read   7.  SW consult for \"home dialysis\" per patient request  8.  AM renal function   9.  Encephalopathy has seemed to have resolved  10.  PT/OT    11.  Patient unable to remember medications, will start carvedilol 6.25 mg   12.  SW consult as patient does not have a dialysis chair                   Discharge Planning: I expect the patient to be discharged to home in ? days.    DC home once dialysis chair obtained     Fuentes Messina MD   12/08/19   10:26 AM    Electronically signed by Fuentes Messina MD at 12/08/19 1826       Consult Notes (last 24 hours) (Notes from 12/08/19 0956 through 12/09/19 0956)    No notes of this type exist for this encounter.       Gomez Lemos, APRN   Nurse Practitioner   Nephrology   Progress Notes   Cosign Needed   Date of Service:  12/09/19 0855   Creation Time:  12/09/19 0855            Cosign Needed        Expand All Collapse All      Show:Clear all  [x]Manual[x]Template[]Copied    Added by:  [x]Gomez Lemos, APRAC    []Gloria for details  Nephrology ([facility]) Progress Note        Patient:  Mini Gentile  YOB: 1960  Date of Service: 12/9/2019  MRN: [id number]        Acct: [id number]      Primary Care Physician: Ivan Kelly MD  Advance Directive:       Code Status and Medical " Interventions:   Ordered at: 12/06/19 1408     Level Of Support Discussed With:     Patient     Code Status:     CPR     Medical Interventions (Level of Support Prior to Arrest):     Full      Admit Date: 12/6/2019                        Hospital Day: 3  Referring Provider: Fuentes Messina MD        Patient personally seen and examined.  Complete chart including Consults, Notes, Operative Reports, Labs, Cardiology, and Radiology studies reviewed as able.           Subjective:  Mini Gentile is a 59 y.o. female  whom we were consulted for end stage renal disease.  Was previously on chronic hemodialysis at [facility].  Patient decided to quit dialysis in September.  History of type 2 diabetes, hypertension.  Presented to emergency department with dyspnea, confusion, and malaise; agreed to resume dialysis. Had urgent hemodialysis on 12/06; second treatment on 12/07; tolerated well and overall condition improved post-dialysis.     Today is seen during dialysis. No new overnight issues.  Dialysis   Pt was seen on RRT; tolerating well  Modality: Hemodialysis  Access: Arterial Venous Fistula  Location: left upper  QB: 450  QD: 700  UF: 2000     Allergies:  Bupropion; Chantix [varenicline]; Sulfa antibiotics; Azithromycin; Levofloxacin; and Penicillins     Home Meds:  Prescriptions Prior to Admission           Medications Prior to Admission   Medication Sig Dispense Refill Last Dose   • aspirin 81 MG EC tablet Take 81 mg by mouth Daily.     12/5/2019 at Unknown time   • benzonatate (TESSALON) 100 MG capsule Take 1 capsule by mouth 3 (Three) Times a Day As Needed for Cough. 20 capsule 0 12/5/2019 at Unknown time   • brimonidine (ALPHAGAN) 0.2 % ophthalmic solution Administer 1 drop into the left eye 3 (Three) Times a Day.     12/5/2019 at Unknown time   • calcitriol (ROCALTROL) 0.25 MCG capsule Take 0.25 mcg by mouth Take As Directed. Only given on dialysis days (Tuesday, Thursday, Saturday)      12/5/2019 at Unknown time   • cetirizine (zyrTEC) 10 MG tablet Take 10 mg by mouth At Night As Needed (allergies).     12/5/2019 at Unknown time   • cholecalciferol (VITAMIN D3) 1000 units tablet Take 1,000 Units by mouth Daily.     12/5/2019 at Unknown time   • dorzolamide (TRUSOPT) 2 % ophthalmic solution Administer 1 drop into the left eye 2 (Two) Times a Day.     12/5/2019 at Unknown time   • famotidine (PEPCID) 20 MG tablet Take 20 mg by mouth 2 (Two) Times a Day.     12/5/2019 at Unknown time   • fluticasone (FLONASE) 50 MCG/ACT nasal spray 1 spray into the nostril(s) as directed by provider 2 (Two) Times a Day.     12/5/2019 at Unknown time   • guaiFENesin (MUCINEX) 600 MG 12 hr tablet Take 2 tablets by mouth Every 12 (Twelve) Hours.     12/5/2019 at Unknown time   • homatropine 5 % ophthalmic solution Administer 1 drop into the left eye Daily.     12/5/2019 at Unknown time   • insulin glargine (LANTUS) 100 UNIT/ML injection Inject 10 Units under the skin Every Night.     12/5/2019 at Unknown time   • lidocaine-prilocaine (EMLA) 2.5-2.5 % cream Apply 1 application topically to the appropriate area as directed Take As Directed. Apply to access area 30 minutes prior to dialysis.      12/5/2019 at Unknown time   • losartan (COZAAR) 25 MG tablet Take 50 mg by mouth Daily.     12/6/2019 at Unknown time   • midodrine (PROAMATINE) 5 MG tablet Take 5 mg by mouth Take As Directed. as needed for low blood pressure during dialysis.     12/5/2019 at Unknown time   • Netarsudil Dimesylate (RHOPRESSA) 0.02 % solution Administer 1 drop into the left eye Every Night.     12/5/2019 at Unknown time   • pravastatin (PRAVACHOL) 10 MG tablet Take 10 mg by mouth Every Night.     12/5/2019 at Unknown time   • sertraline (ZOLOFT) 25 MG tablet Take 25 mg by mouth Every Night.     12/5/2019 at Unknown time   • sevelamer (RENAGEL) 800 MG tablet Take 1,600 mg by mouth 3 (Three) Times a Day With Meals.     12/5/2019 at Unknown time   •  timolol (TIMOPTIC) 0.5 % ophthalmic solution Administer 1 drop into the left eye Every Morning.     12/5/2019 at Unknown time   • travoprost, JUDE free, (TRAVATAN Z) 0.004 % solution ophthalmic solution Administer 1 drop into the left eye Every Night.     12/5/2019 at Unknown time   • predniSONE (DELTASONE) 20 MG tablet  Take 4 tablets by mouth daily for 3 days, 2 tablets daily for 3 days, 1 tablet daily for 3 days then stop 11 tablet 0              Medicines:  Current Medications             Current Facility-Administered Medications   Medication Dose Route Frequency Provider Last Rate Last Dose   • acetaminophen (TYLENOL) tablet 650 mg  650 mg Oral Q4H PRN Fuentes Messina MD   650 mg at 12/08/19 0810     Or   • acetaminophen (TYLENOL) 160 MG/5ML solution 650 mg  650 mg Oral Q4H PRN Fuentes Messina MD         Or   • acetaminophen (TYLENOL) suppository 650 mg  650 mg Rectal Q4H PRN Fuentes Messina MD       • calcitriol (ROCALTROL) capsule 0.25 mcg  0.25 mcg Oral Daily Ten Boyd MD   0.25 mcg at 12/08/19 1516   • calcium acetate (PHOSLO) capsule 667 mg  667 mg Oral TID With Meals Ten Boyd MD   667 mg at 12/08/19 1714   • carvedilol (COREG) tablet 6.25 mg  6.25 mg Oral BID With Meals Fuentes Messina MD   6.25 mg at 12/08/19 1714   • dextrose (D50W) 25 g/ 50mL Intravenous Solution 25 g  25 g Intravenous Q15 Min PRN Fuentes Messina MD       • dextrose (GLUTOSE) oral gel 15 g  15 g Oral Q15 Min PRN Fuentes Messina MD       • epoetin vika-epbx (RETACRIT) injection 10,000 Units  10,000 Units Subcutaneous Once per day on Mon Wed Fri Ten Boyd MD       • gabapentin (NEURONTIN) capsule 100 mg  100 mg Oral Q12H Federico Clancy DO   100 mg at 12/08/19 2020   • glucagon (human recombinant) (GLUCAGEN DIAGNOSTIC) injection 1 mg  1 mg Subcutaneous Q15 Min PRN Fuentes Messina MD       • heparin (porcine) 5000 UNIT/ML injection 5,000 Units  5,000 Units  Subcutaneous Q8H Fuentes Messina MD   5,000 Units at 19 0641   • Influenza Vac Subunit Quad (FLUCELVAX) injection 0.5 mL  0.5 mL Intramuscular During Hospitalization Fuentes Messina MD       • insulin lispro (humaLOG) injection 2-7 Units  2-7 Units Subcutaneous 4x Daily With Meals & Nightly Fuentes Messina MD   3 Units at 19 2030   • ondansetron (ZOFRAN) tablet 4 mg  4 mg Oral Q6H PRN Fuentes Messina MD         Or   • ondansetron (ZOFRAN) injection 4 mg  4 mg Intravenous Q6H PRN Fuentes Messina MD   4 mg at 19 1316   • sodium chloride 0.9 % flush 10 mL  10 mL Intravenous PRN Daniela Esteban APRN       • sodium chloride 0.9 % flush 10 mL  10 mL Intravenous Q12H Fuentes Messina MD   10 mL at 19   • sodium chloride 0.9 % flush 10 mL  10 mL Intravenous PRN Fuentes Messina MD                Past Medical History:  Medical History        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     • Chronic otitis media     • Diabetes (CMS/HCC)     • Eustachian tube dysfunction     • Glaucoma     • HTN (hypertension)     • Kidney failure       on dialysis   • Liver disorder     • Mucoid otitis media              Past Surgical History:  Surgical History         Past Surgical History:   Procedure Laterality Date   • ARTERIOVENOUS FISTULA       • CATARACT EXTRACTION WITH INTRAOCULAR LENS IMPLANT       •  SECTION       • CHOLECYSTECTOMY       • MYRINGOTOMY W/ TUBES Bilateral     • MYRINGOTOMY W/ TUBES Right     • TUBAL ABDOMINAL LIGATION                Family History        Family History   Problem Relation Age of Onset   • Heart disease Mother           Social History  Social History   Social History            Socioeconomic History   • Marital status:        Spouse name: Not on file   • Number of children: Not on file   • Years of education: Not on file   • Highest education level: Not on file   Tobacco Use   •  "Smoking status: Current Every Day Smoker       Packs/day: 0.50       Years: 6.00       Pack years: 3.00       Types: Cigarettes   • Smokeless tobacco: Never Used   Substance and Sexual Activity   • Alcohol use: No   • Drug use: No   • Sexual activity: Defer               Review of Systems:  History obtained from chart review and the patient  General ROS: No fever or chills  Respiratory ROS: No cough, shortness of breath, wheezing  Cardiovascular ROS: No chest pain or palpitations  Gastrointestinal ROS: No abdominal pain or melena  Genito-Urinary ROS: No dysuria or hematuria  Neurological ROS: no headache or dizziness     Objective:  Patient Vitals for the past 24 hrs:    BP Temp Temp src Pulse Resp SpO2 Height Weight   12/09/19 0727 117/63 98 °F (36.7 °C) Oral 76 18 100 % -- --   12/09/19 0414 149/63 97.7 °F (36.5 °C) Oral 62 16 97 % -- --   12/08/19 1951 109/52 98.1 °F (36.7 °C) Oral 71 14 97 % -- --   12/08/19 1500 112/48 98 °F (36.7 °C) Oral 77 18 96 % -- --   12/08/19 1206 136/52 -- -- 91 -- -- -- --   12/08/19 0907 -- -- -- -- -- -- 149.9 cm (59.02\") 61.2 kg (134 lb 14.7 oz)      No intake or output data in the 24 hours ending 12/09/19 0855  General: awake/alert    Neck: supple, no JVD  Chest:  clear to auscultation bilaterally without respiratory distress  CVS: regular rate and rhythm  Abdominal: soft, nontender, positive bowel sounds  Extremities: no cyanosis or edema  Skin: warm and dry without rash  Neuro: no focal motor deficits     Labs:       Results from last 7 days   Lab Units 12/06/19  1144   WBC 10*3/mm3 4.94   HEMOGLOBIN g/dL 7.9*   HEMATOCRIT % 23.8*   PLATELETS 10*3/mm3 278                    Results from last 7 days   Lab Units 12/09/19  0359 12/08/19  0520 12/07/19  0345 12/06/19  1144   SODIUM mmol/L 139 138 138 147*   POTASSIUM mmol/L 4.2 4.4 3.2* 5.3*   CHLORIDE mmol/L 96* 97* 94* 110*   CO2 mmol/L 24.0 24.0 23.0 8.0*   BUN mg/dL 31* 18 35* 123*   CREATININE mg/dL 6.69* 4.50* 6.54* 14.18* "   CALCIUM mg/dL 9.0 9.0 8.9 9.3   BILIRUBIN mg/dL  --   --   --  0.3   ALK PHOS U/L  --   --   --  66   ALT (SGPT) U/L  --   --   --  9   AST (SGOT) U/L  --   --   --  14   GLUCOSE mg/dL 100* 118* 116* 72         Radiology:            Imaging Results (Last 72 Hours)      Procedure Component Value Units Date/Time     XR Chest 1 View [[id number]] Collected:  12/06/19 1332       Updated:  12/06/19 1339     Narrative:        XR CHEST 1 VW- 12/6/2019 1:13 PM CST     HISTORY: dyspnea       COMPARISON: Chest exam dated 09/03/2019.     FINDINGS:   Fairly extensive bibasilar lung consolidation with obscuration of both  hemidiaphragms and what appears to be a trace layering left pleural  effusion. Additional mild patchy infiltrate in the paramedian right  upper lobe. Bilateral diffuse coarsened interstitial markings with what  appears to be subpleural Kerley B lines in the right base. Underlying  mild cardiomegaly. No pneumothorax. Incidentally noted vascular stent in  the region of the left brachiocephalic vein. No acute bony abnormality.         Impression:        1. Bilateral interstitial and basilar consolidative infiltrates. There  appear to be subpleural Kerley B lines in the right base, suggesting a  volume overload with interstitial and casey pulmonary edema. This seems  less likely infectious.        This report was finalized on 12/06/2019 13:35 by Dr Jhonatan Berry, .             Culture:  No results found for: BLOODCX, URINECX, WOUNDCX, MRSACX, RESPCX, STOOLCX        Assessment   1.  End stage renal disease on hemodialysis  2.  Type 2 diabetes with nephropathy  3.  Essential hypertension  4.  Metabolic acidosis  5.  Hyperkalemia  6.  Anemia of CKD  7.  Medical noncompliance     Plan:  1.  Dialysis today  2.  Need to verify if patient still has outpatient chair time; if not, will need to arrange prior to discharge.  3.  Monitor labs        Gomez Lemos, ANTHONY  12/9/2019  8:55 AM               Revision History                               Hepatitis B Surface Antibody [[id number]] (Order [id number])   Order   Date: 12/6/2019 Department: [address] Released By: Mehdi Lebron RN (auto-released) Authorizing: Ten Boyd MD   Reprint Order Requisition     Hepatitis B Surface Antibody (Order #[id number]) on 12/6/19       Contains abnormal data Hepatitis B Surface Antibody   Order: [id number]   Status:  Final result   Visible to patient:  No (Not Released)   Next appt:  None   Component  Ref Range & Units 3d ago   Hep B S Ab  Non-Reactive ReactiveAbnormal     Resulting Agency [facility]         Specimen Collected: 12/06/19 19:42 Last Resulted: 12/07/19 12:07 Lab Flowsheet Order Details View Encounter Lab and Collection Details Routing Result History            Routing History     Priority Sent On From To Message Type    12/8/2019 11:33 AM Zachary Cruz MD Fleming, Fuentes Barroso MD Results   Result Read / Acknowledged      Acknowledge result   No acknowledgement history exists for this order.   Other Results from 12/6/2019      POC Glucose Once  Final result 12/9/2019    Renal Function Panel  Final result 12/9/2019    POC Glucose Once  Final result 12/8/2019    POC Glucose Once  Final result 12/8/2019    Renal Function Panel  Final result 12/8/2019    Iron Profile  Final result 12/7/2019    Renal Function Panel  Final result 12/7/2019    Hepatitis Panel, Acute  Final result 12/6/2019    PTH, Intact  Final result 12/6/2019    POC Glucose Once  Final result 12/6/2019    POC Occult Blood Stool  Final result 12/6/2019    Type & Screen  Edited Result - FINAL 12/6/2019    Comprehensive Metabolic Panel  Final result 12/6/2019    CBC Auto Differential  Final result 12/6/2019    Light Blue Top  Final result 12/6/2019    Green Top (Gel)  Final result 12/6/2019    Lavender Top  Final result 12/6/2019    Red Top  Final result 12/6/2019    Protime-INR  Final result 12/6/2019    BNP  Final result 12/6/2019   (important  suggestion)  Warning: Additional results from 12/6/2019 are available but are not displayed in this report.

## 2019-12-09 NOTE — PROGRESS NOTES
Continued Stay Note  L[facility]     Patient Name: Mini Gentile  MRN: [id number]  Today's Date: 12/9/2019    Admit Date: 12/6/2019    Discharge Plan     Row Name 12/09/19 0835       Plan    Plan  Home    Plan Comments  Pt is thinking she has lost her chair time with dialysis.  Left message with Suma Harrington with Intake to find out if this is the case and if so get one established. Informed that pt is ready for discharge. Will follow.         Discharge Codes    No documentation.             SACHI Douglas

## 2019-12-09 NOTE — PLAN OF CARE
Problem: Patient Care Overview  Goal: Plan of Care Review  Flowsheets (Taken 12/9/2019 1560)  Progress: no change  Plan of Care Reviewed With: patient  Outcome Summary: OT eval completed. Pt performs bed mobility with S. LB dressing in long sitting independently. Pt demos decreased balance with amb requiring CGA to maintain w use of RW. Pt appears at her baseline per obs performance and pt report. Will defer to PT for balance, OT to sign off. Anticipate d/c home w assist.

## 2019-12-09 NOTE — PROGRESS NOTES
"    [facility] Medicine Services  INPATIENT PROGRESS NOTE    Patient Name: Mini Gentile  Date of Admission: 12/6/2019  Today's Date: 12/09/19  Length of Stay: 3  Primary Care Physician: Ivan Kelly MD    Subjective   Chief Complaint: follow-up weakness  HPI   Patient reports that the primary reason she came to the hospital was generalized weakness.  She reports that when she would ambulate she felt \"wobbly\".  She states that she lives at home with her boyfriend who assists her.  She states that she has not had dialysis in many weeks, and became frustrated at being required to get dialysis \"every other day\".  She denies any chest pain.  She denies any shortness of breath.  She does report that she is hopeful to get back home soon.  She is agreeable to being compliant with outpatient dialysis, but reports that it is her desire to see if dialysis can be arranged at home.      Review of Systems     All pertinent negatives and positives are as above. All other systems have been reviewed and are negative unless otherwise stated.     Objective    Temp:  [97.7 °F (36.5 °C)-98.1 °F (36.7 °C)] 98 °F (36.7 °C)  Heart Rate:  [62-91] 76  Resp:  [14-18] 18  BP: (109-149)/(48-63) 117/63  Physical Exam   Constitutional: She is oriented to person, place, and time. No distress.   Nontoxic appearing; currently on dialysis   HENT:   Head: Normocephalic.   Mouth/Throat: No oropharyngeal exudate.   Eyes: No scleral icterus.   Neck: No tracheal deviation present.   Cardiovascular: Normal rate.   Pulmonary/Chest: Effort normal. No respiratory distress.   Requiring no supp 02   Musculoskeletal: She exhibits no edema.   Currently on dialysis; LUE fistula   Neurological: She is alert and oriented to person, place, and time.   Good historian; no focal deficits appreciated at this time   Skin: Skin is warm. She is not diaphoretic.   Psychiatric: She has a normal mood and affect.   Vitals " "reviewed.      Results Review:  I have reviewed the labs, radiology results, and diagnostic studies.    Laboratory Data:   Results from last 7 days   Lab Units 12/06/19  1144   WBC 10*3/mm3 4.94   HEMOGLOBIN g/dL 7.9*   HEMATOCRIT % 23.8*   PLATELETS 10*3/mm3 278        Results from last 7 days   Lab Units 12/09/19  0359 12/08/19  0520 12/07/19  0345 12/06/19  1144   SODIUM mmol/L 139 138 138 147*   POTASSIUM mmol/L 4.2 4.4 3.2* 5.3*   CHLORIDE mmol/L 96* 97* 94* 110*   CO2 mmol/L 24.0 24.0 23.0 8.0*   BUN mg/dL 31* 18 35* 123*   CREATININE mg/dL 6.69* 4.50* 6.54* 14.18*   CALCIUM mg/dL 9.0 9.0 8.9 9.3   BILIRUBIN mg/dL  --   --   --  0.3   ALK PHOS U/L  --   --   --  66   ALT (SGPT) U/L  --   --   --  9   AST (SGOT) U/L  --   --   --  14   GLUCOSE mg/dL 100* 118* 116* 72       Culture Data:   No results found for: BLOODCX, URINECX, WOUNDCX, MRSACX, RESPCX, STOOLCX    Radiology Data:   Imaging Results (Last 24 Hours)     ** No results found for the last 24 hours. **          I have reviewed the patient's current medications.     Assessment/Plan     Active Hospital Problems    Diagnosis   • Anemia due to chronic kidney disease, on chronic dialysis (CMS/[facility])   • Acute pulmonary edema (CMS/[facility])   • Non-compliance with renal dialysis (CMS/[facility])   • Elevated troponin due to ESRD    • Hyperkalemia   • Encephalopathy, metabolic, due to uremia   • High anion gap metabolic acidosis due to uremia   • Type 2 diabetes mellitus, with long-term current use of insulin (CMS/[facility])   • End stage renal failure on dialysis (CMS/[facility])   • Diabetic nephropathy (CMS/[facility])   • HTN (hypertension)     Plan:  1.  Nephrology following; appreciate their assistance; patient currently on dialysis this morning  2.  Patient agreeable to arrangements for outpatient HD at [facility]; SW in process of arranging/confirming chair time  3.  PT evaluation - patient reports she has been \"wobbly\" when ambulating  4.  Begin to restart some home medications as " appropriate  5.  Hold Lantus for now; SSI coverage; monitor BS trend  6.  CBC, BMP, A1c in AM  7.  Epoetin SQ injections ordered  8.  Dispo: patient reports she would like to go home soon; she reports she lives with her boyfriend who assists her and provides good support.      Jaswant Villegas MD   12/09/19   10:35 AM

## 2019-12-10 LAB
ANION GAP SERPL CALCULATED.3IONS-SCNC: 13 MMOL/L (ref 5–15)
BUN BLD-MCNC: 24 MG/DL (ref 8–23)
BUN/CREAT SERPL: 5 (ref 7–25)
CALCIUM SPEC-SCNC: 9.3 MG/DL (ref 8.6–10.5)
CHLORIDE SERPL-SCNC: 97 MMOL/L (ref 98–107)
CO2 SERPL-SCNC: 25 MMOL/L (ref 22–29)
CREAT BLD-MCNC: 4.81 MG/DL (ref 0.57–1)
DEPRECATED RDW RBC AUTO: 47 FL (ref 37–54)
ERYTHROCYTE [DISTWIDTH] IN BLOOD BY AUTOMATED COUNT: 13.9 % (ref 12.3–15.4)
GFR SERPL CREATININE-BSD FRML MDRD: 9 ML/MIN/1.73
GFR SERPL CREATININE-BSD FRML MDRD: ABNORMAL ML/MIN/{1.73_M2}
GLUCOSE BLD-MCNC: 151 MG/DL (ref 65–99)
GLUCOSE BLDC GLUCOMTR-MCNC: 130 MG/DL (ref 70–130)
GLUCOSE BLDC GLUCOMTR-MCNC: 175 MG/DL (ref 70–130)
GLUCOSE BLDC GLUCOMTR-MCNC: 189 MG/DL (ref 70–130)
GLUCOSE BLDC GLUCOMTR-MCNC: 225 MG/DL (ref 70–130)
HBA1C MFR BLD: 5.3 % (ref 4.8–5.6)
HCT VFR BLD AUTO: 25.4 % (ref 34–46.6)
HGB BLD-MCNC: 8.5 G/DL (ref 12–15.9)
MCH RBC QN AUTO: 31.4 PG (ref 26.6–33)
MCHC RBC AUTO-ENTMCNC: 33.5 G/DL (ref 31.5–35.7)
MCV RBC AUTO: 93.7 FL (ref 79–97)
PLATELET # BLD AUTO: 296 10*3/MM3 (ref 140–450)
PMV BLD AUTO: 9.1 FL (ref 6–12)
POTASSIUM BLD-SCNC: 4.3 MMOL/L (ref 3.5–5.2)
RBC # BLD AUTO: 2.71 10*6/MM3 (ref 3.77–5.28)
SODIUM BLD-SCNC: 135 MMOL/L (ref 136–145)
WBC NRBC COR # BLD: 8.38 10*3/MM3 (ref 3.4–10.8)

## 2019-12-10 PROCEDURE — 25010000002 HEPARIN (PORCINE) PER 1000 UNITS: Performed by: INTERNAL MEDICINE

## 2019-12-10 PROCEDURE — 83036 HEMOGLOBIN GLYCOSYLATED A1C: CPT | Performed by: INTERNAL MEDICINE

## 2019-12-10 PROCEDURE — 97110 THERAPEUTIC EXERCISES: CPT

## 2019-12-10 PROCEDURE — 85027 COMPLETE CBC AUTOMATED: CPT | Performed by: INTERNAL MEDICINE

## 2019-12-10 PROCEDURE — 80048 BASIC METABOLIC PNL TOTAL CA: CPT | Performed by: INTERNAL MEDICINE

## 2019-12-10 PROCEDURE — 63710000001 INSULIN LISPRO (HUMAN) PER 5 UNITS: Performed by: INTERNAL MEDICINE

## 2019-12-10 PROCEDURE — 82962 GLUCOSE BLOOD TEST: CPT

## 2019-12-10 PROCEDURE — 97116 GAIT TRAINING THERAPY: CPT

## 2019-12-10 RX ORDER — LANOLIN ALCOHOL/MO/W.PET/CERES
3 CREAM (GRAM) TOPICAL NIGHTLY PRN
Status: DISCONTINUED | OUTPATIENT
Start: 2019-12-10 | End: 2019-12-11 | Stop reason: HOSPADM

## 2019-12-10 RX ADMIN — HEPARIN SODIUM 5000 UNITS: 5000 INJECTION, SOLUTION INTRAVENOUS; SUBCUTANEOUS at 07:41

## 2019-12-10 RX ADMIN — HEPARIN SODIUM 5000 UNITS: 5000 INJECTION, SOLUTION INTRAVENOUS; SUBCUTANEOUS at 13:29

## 2019-12-10 RX ADMIN — ACETAMINOPHEN 650 MG: 325 TABLET, FILM COATED ORAL at 01:20

## 2019-12-10 RX ADMIN — CALCIUM ACETATE 667 MG: 667 CAPSULE ORAL at 08:30

## 2019-12-10 RX ADMIN — Medication 3 MG: at 21:34

## 2019-12-10 RX ADMIN — HEPARIN SODIUM 5000 UNITS: 5000 INJECTION, SOLUTION INTRAVENOUS; SUBCUTANEOUS at 20:10

## 2019-12-10 RX ADMIN — ACETAMINOPHEN 650 MG: 325 TABLET, FILM COATED ORAL at 08:36

## 2019-12-10 RX ADMIN — ACETAMINOPHEN 650 MG: 325 TABLET, FILM COATED ORAL at 20:18

## 2019-12-10 RX ADMIN — INSULIN LISPRO 3 UNITS: 100 INJECTION, SOLUTION INTRAVENOUS; SUBCUTANEOUS at 20:08

## 2019-12-10 RX ADMIN — CARVEDILOL 6.25 MG: 6.25 TABLET, FILM COATED ORAL at 17:29

## 2019-12-10 RX ADMIN — CALCIUM ACETATE 667 MG: 667 CAPSULE ORAL at 12:06

## 2019-12-10 RX ADMIN — INSULIN LISPRO 2 UNITS: 100 INJECTION, SOLUTION INTRAVENOUS; SUBCUTANEOUS at 17:29

## 2019-12-10 RX ADMIN — CALCITRIOL 0.25 MCG: 0.25 CAPSULE ORAL at 08:30

## 2019-12-10 RX ADMIN — ASPIRIN 81 MG: 81 TABLET ORAL at 08:30

## 2019-12-10 RX ADMIN — GABAPENTIN 100 MG: 100 CAPSULE ORAL at 20:10

## 2019-12-10 RX ADMIN — CALCIUM ACETATE 667 MG: 667 CAPSULE ORAL at 17:29

## 2019-12-10 RX ADMIN — PRAVASTATIN SODIUM 10 MG: 20 TABLET ORAL at 20:10

## 2019-12-10 RX ADMIN — CARVEDILOL 6.25 MG: 6.25 TABLET, FILM COATED ORAL at 08:30

## 2019-12-10 RX ADMIN — GABAPENTIN 100 MG: 100 CAPSULE ORAL at 08:31

## 2019-12-10 RX ADMIN — INSULIN LISPRO 2 UNITS: 100 INJECTION, SOLUTION INTRAVENOUS; SUBCUTANEOUS at 12:05

## 2019-12-10 NOTE — PROGRESS NOTES
Continued Stay Note   Db     Patient Name: Mini Gentile  MRN: [id number]  Today's Date: 12/10/2019    Admit Date: 12/6/2019    Discharge Plan     Row Name 12/10/19 0833       Plan    Plan  Pending chair time at [facility]    Patient/Family in Agreement with Plan  yes    Plan Comments  Pt is saying she wants to continue dialysis at discharge. Suma Harrington from [facility] saying this will need to be approved by Dr. Caballero.  Called Dr. Caballero's office and spoke with Coral [phone] she is going to ask Dr. Gonzalez that is covering today. Will follow.     Received call back from Bebe at Dr. Caballero's office and Dr. Gonzalez has approved pt returning back for dialysis.          Discharge Codes    No documentation.             SACHI Douglas

## 2019-12-10 NOTE — PLAN OF CARE
Problem: Patient Care Overview  Goal: Plan of Care Review  Outcome: Ongoing (interventions implemented as appropriate)  Flowsheets (Taken 12/10/2019 1620)  Progress: improving  Plan of Care Reviewed With: patient  Outcome Summary: VSS, c/o of back pain, prn tylenol given, c/o not slept well last night, now has melatonin prn, creatinine improving, to have dialysis again tomorrow per Dr. Gonzalez, xi soon once dialysis chair time obtained

## 2019-12-10 NOTE — PROGRESS NOTES
Nephrology ([facility]) Progress Note      Patient:  Mini Gentile  YOB: 1960  Date of Service: 12/10/2019  MRN: [id number]   Acct: [id number]   Primary Care Physician: Ivan Kelly MD  Advance Directive:   Code Status and Medical Interventions:   Ordered at: 12/06/19 1408     Level Of Support Discussed With:    Patient     Code Status:    CPR     Medical Interventions (Level of Support Prior to Arrest):    Full     Admit Date: 12/6/2019       Hospital Day: 4  Referring Provider: Fuentes Messina MD      Patient personally seen and examined.  Complete chart including Consults, Notes, Operative Reports, Labs, Cardiology, and Radiology studies reviewed as able.        Subjective:  Mini Gentile is a 59 y.o. female  whom we were consulted for end stage renal disease.  Was previously on chronic hemodialysis at [facility].  Patient decided to quit dialysis in September.  History of type 2 diabetes, hypertension.  Presented to emergency department with dyspnea, confusion, and malaise; agreed to resume dialysis. Had urgent hemodialysis on 12/06; second treatment on 12/07; tolerated well and overall condition improved post-dialysis. Additional treatment on 12/09.    Today is feeling better. No new overnight issues.    Allergies:  Bupropion; Chantix [varenicline]; Sulfa antibiotics; Azithromycin; Levofloxacin; and Penicillins    Home Meds:  Medications Prior to Admission   Medication Sig Dispense Refill Last Dose   • aspirin 81 MG EC tablet Take 81 mg by mouth Daily.   12/5/2019 at Unknown time   • benzonatate (TESSALON) 100 MG capsule Take 1 capsule by mouth 3 (Three) Times a Day As Needed for Cough. 20 capsule 0 12/5/2019 at Unknown time   • brimonidine (ALPHAGAN) 0.2 % ophthalmic solution Administer 1 drop into the left eye 3 (Three) Times a Day.   12/5/2019 at Unknown time   • calcitriol (ROCALTROL) 0.25 MCG capsule Take 0.25 mcg by mouth  Take As Directed. Only given on dialysis days (Tuesday, Thursday, Saturday)   12/5/2019 at Unknown time   • cetirizine (zyrTEC) 10 MG tablet Take 10 mg by mouth At Night As Needed (allergies).   12/5/2019 at Unknown time   • cholecalciferol (VITAMIN D3) 1000 units tablet Take 1,000 Units by mouth Daily.   12/5/2019 at Unknown time   • dorzolamide (TRUSOPT) 2 % ophthalmic solution Administer 1 drop into the left eye 2 (Two) Times a Day.   12/5/2019 at Unknown time   • famotidine (PEPCID) 20 MG tablet Take 20 mg by mouth 2 (Two) Times a Day.   12/5/2019 at Unknown time   • fluticasone (FLONASE) 50 MCG/ACT nasal spray 1 spray into the nostril(s) as directed by provider 2 (Two) Times a Day.   12/5/2019 at Unknown time   • guaiFENesin (MUCINEX) 600 MG 12 hr tablet Take 2 tablets by mouth Every 12 (Twelve) Hours.   12/5/2019 at Unknown time   • homatropine 5 % ophthalmic solution Administer 1 drop into the left eye Daily.   12/5/2019 at Unknown time   • insulin glargine (LANTUS) 100 UNIT/ML injection Inject 10 Units under the skin Every Night.   12/5/2019 at Unknown time   • lidocaine-prilocaine (EMLA) 2.5-2.5 % cream Apply 1 application topically to the appropriate area as directed Take As Directed. Apply to access area 30 minutes prior to dialysis.    12/5/2019 at Unknown time   • losartan (COZAAR) 25 MG tablet Take 50 mg by mouth Daily.   12/6/2019 at Unknown time   • midodrine (PROAMATINE) 5 MG tablet Take 5 mg by mouth Take As Directed. as needed for low blood pressure during dialysis.   12/5/2019 at Unknown time   • Netarsudil Dimesylate (RHOPRESSA) 0.02 % solution Administer 1 drop into the left eye Every Night.   12/5/2019 at Unknown time   • pravastatin (PRAVACHOL) 10 MG tablet Take 10 mg by mouth Every Night.   12/5/2019 at Unknown time   • sertraline (ZOLOFT) 25 MG tablet Take 25 mg by mouth Every Night.   12/5/2019 at Unknown time   • sevelamer (RENAGEL) 800 MG tablet Take 1,600 mg by mouth 3 (Three) Times a  Day With Meals.   12/5/2019 at Unknown time   • timolol (TIMOPTIC) 0.5 % ophthalmic solution Administer 1 drop into the left eye Every Morning.   12/5/2019 at Unknown time   • travoprost, JUDE free, (TRAVATAN Z) 0.004 % solution ophthalmic solution Administer 1 drop into the left eye Every Night.   12/5/2019 at Unknown time   • predniSONE (DELTASONE) 20 MG tablet  Take 4 tablets by mouth daily for 3 days, 2 tablets daily for 3 days, 1 tablet daily for 3 days then stop 11 tablet 0        Medicines:  Current Facility-Administered Medications   Medication Dose Route Frequency Provider Last Rate Last Dose   • acetaminophen (TYLENOL) tablet 650 mg  650 mg Oral Q4H PRN Fuentes Messina MD   650 mg at 12/10/19 0836    Or   • acetaminophen (TYLENOL) 160 MG/5ML solution 650 mg  650 mg Oral Q4H PRN Fuentes Messina MD        Or   • acetaminophen (TYLENOL) suppository 650 mg  650 mg Rectal Q4H PRN Fuentes Messina MD       • aspirin EC tablet 81 mg  81 mg Oral Daily Jaswant Villegas MD   81 mg at 12/10/19 0830   • calcitriol (ROCALTROL) capsule 0.25 mcg  0.25 mcg Oral Daily Ten Boyd MD   0.25 mcg at 12/10/19 0830   • calcium acetate (PHOSLO) capsule 667 mg  667 mg Oral TID With Meals Ten Boyd MD   667 mg at 12/10/19 0830   • carvedilol (COREG) tablet 6.25 mg  6.25 mg Oral BID With Meals Fuentes Messina MD   6.25 mg at 12/10/19 0830   • dextrose (D50W) 25 g/ 50mL Intravenous Solution 25 g  25 g Intravenous Q15 Min PRN Fuentes Messina MD       • dextrose (GLUTOSE) oral gel 15 g  15 g Oral Q15 Min PRN Fuentes Messina MD       • epoetin vika-epbx (RETACRIT) injection 10,000 Units  10,000 Units Subcutaneous Once per day on Mon Wed Fri Ten Boyd MD   10,000 Units at 12/09/19 1237   • gabapentin (NEURONTIN) capsule 100 mg  100 mg Oral Q12H Federico Clancy    100 mg at 12/10/19 0831   • glucagon (human recombinant) (GLUCAGEN DIAGNOSTIC) injection 1 mg  1 mg  Subcutaneous Q15 Min PRN Fuentes Messina MD       • heparin (porcine) 5000 UNIT/ML injection 5,000 Units  5,000 Units Subcutaneous Q8H Fuentes Messina MD   5,000 Units at 12/10/19 0741   • insulin lispro (humaLOG) injection 2-7 Units  2-7 Units Subcutaneous 4x Daily With Meals & Nightly Fuentes Messina MD   3 Units at 19 2100   • ondansetron (ZOFRAN) tablet 4 mg  4 mg Oral Q6H PRN Fuentes Messina MD        Or   • ondansetron (ZOFRAN) injection 4 mg  4 mg Intravenous Q6H PRN Fuentes Messina MD   4 mg at 19 1316   • pravastatin (PRAVACHOL) tablet 10 mg  10 mg Oral Nightly Jaswant Villegas MD   10 mg at 19   • sertraline (ZOLOFT) tablet 25 mg  25 mg Oral Daily Jaswant Villegas MD   25 mg at 19 1230   • sodium chloride 0.9 % flush 10 mL  10 mL Intravenous PRN Daniela Esteban, APRN       • sodium chloride 0.9 % flush 10 mL  10 mL Intravenous Q12H Fuentes Messina MD   10 mL at 19 2020   • sodium chloride 0.9 % flush 10 mL  10 mL Intravenous PRN Fuentes Messina MD           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    • Chronic otitis media    • Diabetes (CMS/HCC)    • Eustachian tube dysfunction    • Glaucoma    • HTN (hypertension)    • Kidney failure     on dialysis   • Liver disorder    • Mucoid otitis media        Past Surgical History:  Past Surgical History:   Procedure Laterality Date   • ARTERIOVENOUS FISTULA     • CATARACT EXTRACTION WITH INTRAOCULAR LENS IMPLANT     •  SECTION     • CHOLECYSTECTOMY     • MYRINGOTOMY W/ TUBES Bilateral    • MYRINGOTOMY W/ TUBES Right    • TUBAL ABDOMINAL LIGATION         Family History  Family History   Problem Relation Age of Onset   • Heart disease Mother        Social History  Social History     Socioeconomic History   • Marital status:      Spouse name: Not on file   • Number of children: Not on file   • Years  of education: Not on file   • Highest education level: Not on file   Tobacco Use   • Smoking status: Current Every Day Smoker     Packs/day: 0.50     Years: 6.00     Pack years: 3.00     Types: Cigarettes   • Smokeless tobacco: Never Used   Substance and Sexual Activity   • Alcohol use: No   • Drug use: No   • Sexual activity: Defer         Review of Systems:  History obtained from chart review and the patient  General ROS: No fever or chills  Respiratory ROS: No cough, shortness of breath, wheezing  Cardiovascular ROS: No chest pain or palpitations  Gastrointestinal ROS: No abdominal pain or melena  Genito-Urinary ROS: No dysuria or hematuria  Neurological ROS: no headache or dizziness    Objective:  Patient Vitals for the past 24 hrs:   BP Temp Temp src Pulse Resp SpO2 Weight   12/10/19 0730 138/53 97.9 °F (36.6 °C) Oral 70 16 98 % --   12/10/19 0025 92/48 98.5 °F (36.9 °C) Oral 70 16 95 % --   12/09/19 1925 103/56 98.6 °F (37 °C) Oral 82 18 97 % 55.4 kg (122 lb 2.2 oz)   12/09/19 1700 116/65 98.9 °F (37.2 °C) Oral 81 18 97 % --       Intake/Output Summary (Last 24 hours) at 12/10/2019 0953  Last data filed at 12/9/2019 1825  Gross per 24 hour   Intake 240 ml   Output --   Net 240 ml     General: awake/alert    Neck: supple, no JVD  Chest:  clear to auscultation bilaterally without respiratory distress  CVS: regular rate and rhythm  Abdominal: soft, nontender, positive bowel sounds  Extremities: no cyanosis or edema  Skin: warm and dry without rash  Neuro: no focal motor deficits    Labs:  Results from last 7 days   Lab Units 12/10/19  0642 12/06/19  1144   WBC 10*3/mm3 8.38 4.94   HEMOGLOBIN g/dL 8.5* 7.9*   HEMATOCRIT % 25.4* 23.8*   PLATELETS 10*3/mm3 296 278         Results from last 7 days   Lab Units 12/10/19  0642 12/09/19  0359 12/08/19  0520  12/06/19  1144   SODIUM mmol/L 135* 139 138   < > 147*   POTASSIUM mmol/L 4.3 4.2 4.4   < > 5.3*   CHLORIDE mmol/L 97* 96* 97*   < > 110*   CO2 mmol/L 25.0 24.0 24.0    < > 8.0*   BUN mg/dL 24* 31* 18   < > 123*   CREATININE mg/dL 4.81* 6.69* 4.50*   < > 14.18*   CALCIUM mg/dL 9.3 9.0 9.0   < > 9.3   BILIRUBIN mg/dL  --   --   --   --  0.3   ALK PHOS U/L  --   --   --   --  66   ALT (SGPT) U/L  --   --   --   --  9   AST (SGOT) U/L  --   --   --   --  14   GLUCOSE mg/dL 151* 100* 118*   < > 72    < > = values in this interval not displayed.       Radiology:   Imaging Results (Last 72 Hours)     ** No results found for the last 72 hours. **          Culture:  No results found for: BLOODCX, URINECX, WOUNDCX, MRSACX, RESPCX, STOOLCX      Assessment   1.  End stage renal disease on hemodialysis  2.  Type 2 diabetes with nephropathy  3.  Essential hypertension  4.  Metabolic acidosis--improved  5.  Hyperkalemia--improved  6.  Anemia of CKD  7.  Medical noncompliance    Plan:  1.  Dialysis next due 12/11  2.  Working on outpatient dialysis chair time  3.  Monitor labs      Gomez Lemos, ANTHONY  12/10/2019  9:53 AM

## 2019-12-10 NOTE — PLAN OF CARE
Problem: Patient Care Overview  Goal: Plan of Care Review  Outcome: Ongoing (interventions implemented as appropriate)  Flowsheets (Taken 12/10/2019 0424)  Progress: no change  Plan of Care Reviewed With: patient  Outcome Summary: Pt denies pain. Tylenol given X1 per her request to help her relax. She has not slept well. Dialysis yesterday.

## 2019-12-10 NOTE — PROGRESS NOTES
[facility] Medicine Services  INPATIENT PROGRESS NOTE    Patient Name: Mini Gentile  Date of Admission: 12/6/2019  Today's Date: 12/10/19  Length of Stay: 4  Primary Care Physician: Ivan Kelly MD    Subjective   Chief Complaint: follow-up weakness  HPI   Patient reports that she is feeling much better and is eager to get back home.  She reports that she will be compliant with dialysis on an outpatient basis.  She denies any new pain.  Denies any shortness of breath.  Denies any confusion.  Reports that she has been up ambulating today with physical therapy, and states that she is getting stronger.  Voices no new complaints.    Review of Systems   Respiratory: Positive for shortness of breath.         All pertinent negatives and positives are as above. All other systems have been reviewed and are negative unless otherwise stated.     Objective    Temp:  [97.9 °F (36.6 °C)-98.9 °F (37.2 °C)] 97.9 °F (36.6 °C)  Heart Rate:  [70-82] 70  Resp:  [16-18] 16  BP: ()/(48-65) 138/53  Physical Exam   Constitutional: She is oriented to person, place, and time. No distress.   Nontoxic appearing; sitting up in bedside chair   HENT:   Head: Normocephalic.   Mouth/Throat: No oropharyngeal exudate.   Eyes: No scleral icterus.   Neck: No tracheal deviation present.   Cardiovascular: Normal rate.   Pulmonary/Chest: Effort normal. No respiratory distress.   Requiring no supp 02   Musculoskeletal: She exhibits no edema.    LUE fistula   Neurological: She is alert and oriented to person, place, and time.   Good historian; no focal deficits appreciated at this time   Skin: Skin is warm. She is not diaphoretic.   Psychiatric: She has a normal mood and affect.   Vitals reviewed.      Results Review:  I have reviewed the labs, radiology results, and diagnostic studies.    Laboratory Data:   Results from last 7 days   Lab Units 12/10/19  0642 12/06/19  1144   WBC 10*3/mm3 8.38  4.94   HEMOGLOBIN g/dL 8.5* 7.9*   HEMATOCRIT % 25.4* 23.8*   PLATELETS 10*3/mm3 296 278        Results from last 7 days   Lab Units 12/10/19  0642 12/09/19  0359 12/08/19  0520  12/06/19  1144   SODIUM mmol/L 135* 139 138   < > 147*   POTASSIUM mmol/L 4.3 4.2 4.4   < > 5.3*   CHLORIDE mmol/L 97* 96* 97*   < > 110*   CO2 mmol/L 25.0 24.0 24.0   < > 8.0*   BUN mg/dL 24* 31* 18   < > 123*   CREATININE mg/dL 4.81* 6.69* 4.50*   < > 14.18*   CALCIUM mg/dL 9.3 9.0 9.0   < > 9.3   BILIRUBIN mg/dL  --   --   --   --  0.3   ALK PHOS U/L  --   --   --   --  66   ALT (SGPT) U/L  --   --   --   --  9   AST (SGOT) U/L  --   --   --   --  14   GLUCOSE mg/dL 151* 100* 118*   < > 72    < > = values in this interval not displayed.       Culture Data:   No results found for: BLOODCX, URINECX, WOUNDCX, MRSACX, RESPCX, STOOLCX    Radiology Data:   Imaging Results (Last 24 Hours)     ** No results found for the last 24 hours. **          I have reviewed the patient's current medications.     Assessment/Plan     Active Hospital Problems    Diagnosis   • Anemia due to chronic kidney disease, on chronic dialysis (CMS/[facility])   • Acute pulmonary edema (CMS/[facility])   • Non-compliance with renal dialysis (CMS/[facility])   • Elevated troponin due to ESRD    • Hyperkalemia   • Encephalopathy, metabolic, due to uremia   • High anion gap metabolic acidosis due to uremia   • Type 2 diabetes mellitus, with long-term current use of insulin (CMS/[facility])   • End stage renal failure on dialysis (CMS/[facility])   • Diabetic nephropathy (CMS/[facility])   • HTN (hypertension)     Plan:  1.  Nephrology following; appreciate their assistance  2.  Patient agreeable to arrangements for outpatient HD at [facility];  in process of arranging/confirming chair time; when confirmed I anticipate patient will be ready for discharge  3.  Physical therapy; increase activity  4.  Patient requests that we stop Zoloft - she states she was no longer taking on outpatient basis  5.  Hold Lantus for  now; SSI coverage; monitor BS trend  6.  A1c = 5.3  7.  Epoetin SQ injections ordered  8.  Dispo: patient reports she would like to go home soon; she reports she lives with her boyfriend who assists her and provides good support.  Anticipate home soon pending arrangements of outpatient dialysis.      Jaswant Villegas MD   12/10/19   11:34 AM

## 2019-12-11 VITALS
HEART RATE: 74 BPM | HEIGHT: 59 IN | SYSTOLIC BLOOD PRESSURE: 97 MMHG | DIASTOLIC BLOOD PRESSURE: 54 MMHG | TEMPERATURE: 97.2 F | BODY MASS INDEX: 25.5 KG/M2 | RESPIRATION RATE: 16 BRPM | OXYGEN SATURATION: 100 % | WEIGHT: 126.5 LBS

## 2019-12-11 LAB
ANION GAP SERPL CALCULATED.3IONS-SCNC: 16 MMOL/L (ref 5–15)
BUN BLD-MCNC: 40 MG/DL (ref 8–23)
BUN/CREAT SERPL: 6.2 (ref 7–25)
CALCIUM SPEC-SCNC: 9 MG/DL (ref 8.6–10.5)
CHLORIDE SERPL-SCNC: 88 MMOL/L (ref 98–107)
CO2 SERPL-SCNC: 23 MMOL/L (ref 22–29)
CREAT BLD-MCNC: 6.47 MG/DL (ref 0.57–1)
GFR SERPL CREATININE-BSD FRML MDRD: 7 ML/MIN/1.73
GFR SERPL CREATININE-BSD FRML MDRD: ABNORMAL ML/MIN/{1.73_M2}
GLUCOSE BLD-MCNC: 194 MG/DL (ref 65–99)
GLUCOSE BLDC GLUCOMTR-MCNC: 180 MG/DL (ref 70–130)
GLUCOSE BLDC GLUCOMTR-MCNC: 190 MG/DL (ref 70–130)
GLUCOSE BLDC GLUCOMTR-MCNC: 192 MG/DL (ref 70–130)
POTASSIUM BLD-SCNC: 4.8 MMOL/L (ref 3.5–5.2)
SODIUM BLD-SCNC: 127 MMOL/L (ref 136–145)

## 2019-12-11 PROCEDURE — 80048 BASIC METABOLIC PNL TOTAL CA: CPT | Performed by: NURSE PRACTITIONER

## 2019-12-11 PROCEDURE — 97116 GAIT TRAINING THERAPY: CPT

## 2019-12-11 PROCEDURE — 97110 THERAPEUTIC EXERCISES: CPT

## 2019-12-11 PROCEDURE — 82962 GLUCOSE BLOOD TEST: CPT

## 2019-12-11 PROCEDURE — 63710000001 INSULIN LISPRO (HUMAN) PER 5 UNITS: Performed by: INTERNAL MEDICINE

## 2019-12-11 PROCEDURE — 25010000002 EPOETIN ALFA-EPBX 10000 UNIT/ML SOLUTION: Performed by: INTERNAL MEDICINE

## 2019-12-11 PROCEDURE — 25010000002 HEPARIN (PORCINE) PER 1000 UNITS: Performed by: INTERNAL MEDICINE

## 2019-12-11 RX ORDER — FAMOTIDINE 20 MG/1
20 TABLET, FILM COATED ORAL DAILY
Status: DISCONTINUED | OUTPATIENT
Start: 2019-12-11 | End: 2019-12-11 | Stop reason: HOSPADM

## 2019-12-11 RX ORDER — FAMOTIDINE 20 MG/1
20 TABLET, FILM COATED ORAL DAILY
Status: DISCONTINUED | OUTPATIENT
Start: 2019-12-11 | End: 2019-12-11

## 2019-12-11 RX ORDER — LOSARTAN POTASSIUM 25 MG/1
25 TABLET ORAL DAILY
Qty: 30 TABLET | Refills: 1 | Status: ON HOLD | OUTPATIENT
Start: 2019-12-11 | End: 2020-01-20

## 2019-12-11 RX ORDER — CARVEDILOL 6.25 MG/1
6.25 TABLET ORAL 2 TIMES DAILY WITH MEALS
Qty: 60 TABLET | Refills: 2 | Status: SHIPPED | OUTPATIENT
Start: 2019-12-11 | End: 2020-01-21 | Stop reason: HOSPADM

## 2019-12-11 RX ADMIN — INSULIN LISPRO 2 UNITS: 100 INJECTION, SOLUTION INTRAVENOUS; SUBCUTANEOUS at 17:24

## 2019-12-11 RX ADMIN — HEPARIN SODIUM 5000 UNITS: 5000 INJECTION, SOLUTION INTRAVENOUS; SUBCUTANEOUS at 06:05

## 2019-12-11 RX ADMIN — EPOETIN ALFA-EPBX 10000 UNITS: 10000 INJECTION, SOLUTION INTRAVENOUS; SUBCUTANEOUS at 08:22

## 2019-12-11 RX ADMIN — CALCIUM ACETATE 667 MG: 667 CAPSULE ORAL at 17:22

## 2019-12-11 RX ADMIN — CALCIUM ACETATE 667 MG: 667 CAPSULE ORAL at 12:07

## 2019-12-11 RX ADMIN — CALCITRIOL 0.25 MCG: 0.25 CAPSULE ORAL at 08:21

## 2019-12-11 RX ADMIN — ASPIRIN 81 MG: 81 TABLET ORAL at 08:21

## 2019-12-11 RX ADMIN — CARVEDILOL 6.25 MG: 6.25 TABLET, FILM COATED ORAL at 08:21

## 2019-12-11 RX ADMIN — INSULIN LISPRO 2 UNITS: 100 INJECTION, SOLUTION INTRAVENOUS; SUBCUTANEOUS at 12:07

## 2019-12-11 RX ADMIN — FAMOTIDINE 20 MG: 20 TABLET, FILM COATED ORAL at 00:19

## 2019-12-11 RX ADMIN — GABAPENTIN 100 MG: 100 CAPSULE ORAL at 08:21

## 2019-12-11 RX ADMIN — INSULIN LISPRO 2 UNITS: 100 INJECTION, SOLUTION INTRAVENOUS; SUBCUTANEOUS at 08:21

## 2019-12-11 RX ADMIN — CALCIUM ACETATE 667 MG: 667 CAPSULE ORAL at 08:21

## 2019-12-11 NOTE — PLAN OF CARE
Problem: Patient Care Overview  Goal: Plan of Care Review  Outcome: Ongoing (interventions implemented as appropriate)  Flowsheets  Taken 12/11/2019 0320  Progress: improving  Outcome Summary: VSS. PRN tylenol given for back pain. Melatonin given for sleep. Patient complained of heartburn. Pepcid ordered daily. Dialysis this AM. Awaiting dialysis chair placement. Cont to monitor.  Taken 12/10/2019 2018  Plan of Care Reviewed With: patient  Goal: Individualization and Mutuality  Outcome: Ongoing (interventions implemented as appropriate)  Goal: Discharge Needs Assessment  Outcome: Ongoing (interventions implemented as appropriate)  Goal: Interprofessional Rounds/Family Conf  Outcome: Ongoing (interventions implemented as appropriate)     Problem: Fall Risk (Adult)  Goal: Absence of Fall  Outcome: Ongoing (interventions implemented as appropriate)  Flowsheets (Taken 12/10/2019 0424 by Charleen Hernandes RN)  Absence of Fall: making progress toward outcome     Problem: Hemodialysis (Adult)  Goal: Signs and Symptoms of Listed Potential Problems Will be Absent, Minimized or Managed (Hemodialysis)  Outcome: Ongoing (interventions implemented as appropriate)  Flowsheets  Taken 12/9/2019 0507 by Alyx Lr RN  Problems Assessed (Hemodialysis): all  Taken 12/8/2019 0256 by Xena Rutherford RN  Problems Present (Hemodialysis): nausea and vomiting;situational response     Problem: Kidney Disease, Chronic/End Stage Renal Disease (Adult)  Goal: Signs and Symptoms of Listed Potential Problems Will be Absent, Minimized or Managed (Kidney Disease, Chronic/End Stage Renal Disease)  Outcome: Ongoing (interventions implemented as appropriate)  Flowsheets (Taken 12/10/2019 0424 by Charleen Hernandes RN)  Problems Assessed (Chronic Kidney Disease/ESRD): all  Problems Present (Chronic Kidney/ESRD): fluid imbalance;electrolyte imbalance;situational response;uremic syndrome     Problem: Pain, Acute (Adult)  Goal: Acceptable Pain  Control/Comfort Level  Outcome: Ongoing (interventions implemented as appropriate)  Flowsheets (Taken 12/10/2019 0424 by Charleen Hernandes RN)  Acceptable Pain Control/Comfort Level: making progress toward outcome

## 2019-12-11 NOTE — PROGRESS NOTES
Nephrology ([facility]) Progress Note      Patient:  Mini Gentile  YOB: 1960  Date of Service: 12/11/2019  MRN: [id number]   Acct: [id number]   Primary Care Physician: Ivan Kelly MD  Advance Directive:   Code Status and Medical Interventions:   Ordered at: 12/06/19 1408     Level Of Support Discussed With:    Patient     Code Status:    CPR     Medical Interventions (Level of Support Prior to Arrest):    Full     Admit Date: 12/6/2019       Hospital Day: 5  Referring Provider: Fuentes Messina MD      Patient personally seen and examined.  Complete chart including Consults, Notes, Operative Reports, Labs, Cardiology, and Radiology studies reviewed as able.        Subjective:  Mini Gentile is a 59 y.o. female  whom we were consulted for end stage renal disease.  Was previously on chronic hemodialysis at [facility].  Patient decided to quit dialysis in September.  History of type 2 diabetes, hypertension.  Presented to emergency department with dyspnea, confusion, and malaise; agreed to resume dialysis. Had urgent hemodialysis on 12/06; second treatment on 12/07; tolerated well and overall condition improved post-dialysis. Additional treatment on 12/09.    Today has no complaints. No new overnight issues.    Allergies:  Bupropion; Chantix [varenicline]; Sulfa antibiotics; Azithromycin; Levofloxacin; and Penicillins    Home Meds:  Medications Prior to Admission   Medication Sig Dispense Refill Last Dose   • aspirin 81 MG EC tablet Take 81 mg by mouth Daily.   12/5/2019 at Unknown time   • benzonatate (TESSALON) 100 MG capsule Take 1 capsule by mouth 3 (Three) Times a Day As Needed for Cough. 20 capsule 0 12/5/2019 at Unknown time   • brimonidine (ALPHAGAN) 0.2 % ophthalmic solution Administer 1 drop into the left eye 3 (Three) Times a Day.   12/5/2019 at Unknown time   • calcitriol (ROCALTROL) 0.25 MCG capsule Take 0.25 mcg by mouth  Take As Directed. Only given on dialysis days (Tuesday, Thursday, Saturday)   12/5/2019 at Unknown time   • cetirizine (zyrTEC) 10 MG tablet Take 10 mg by mouth At Night As Needed (allergies).   12/5/2019 at Unknown time   • cholecalciferol (VITAMIN D3) 1000 units tablet Take 1,000 Units by mouth Daily.   12/5/2019 at Unknown time   • dorzolamide (TRUSOPT) 2 % ophthalmic solution Administer 1 drop into the left eye 2 (Two) Times a Day.   12/5/2019 at Unknown time   • famotidine (PEPCID) 20 MG tablet Take 20 mg by mouth 2 (Two) Times a Day.   12/5/2019 at Unknown time   • fluticasone (FLONASE) 50 MCG/ACT nasal spray 1 spray into the nostril(s) as directed by provider 2 (Two) Times a Day.   12/5/2019 at Unknown time   • guaiFENesin (MUCINEX) 600 MG 12 hr tablet Take 2 tablets by mouth Every 12 (Twelve) Hours.   12/5/2019 at Unknown time   • homatropine 5 % ophthalmic solution Administer 1 drop into the left eye Daily.   12/5/2019 at Unknown time   • insulin glargine (LANTUS) 100 UNIT/ML injection Inject 10 Units under the skin Every Night.   12/5/2019 at Unknown time   • lidocaine-prilocaine (EMLA) 2.5-2.5 % cream Apply 1 application topically to the appropriate area as directed Take As Directed. Apply to access area 30 minutes prior to dialysis.    12/5/2019 at Unknown time   • losartan (COZAAR) 25 MG tablet Take 50 mg by mouth Daily.   12/6/2019 at Unknown time   • midodrine (PROAMATINE) 5 MG tablet Take 5 mg by mouth Take As Directed. as needed for low blood pressure during dialysis.   12/5/2019 at Unknown time   • Netarsudil Dimesylate (RHOPRESSA) 0.02 % solution Administer 1 drop into the left eye Every Night.   12/5/2019 at Unknown time   • pravastatin (PRAVACHOL) 10 MG tablet Take 10 mg by mouth Every Night.   12/5/2019 at Unknown time   • sertraline (ZOLOFT) 25 MG tablet Take 25 mg by mouth Every Night.   12/5/2019 at Unknown time   • sevelamer (RENAGEL) 800 MG tablet Take 1,600 mg by mouth 3 (Three) Times a  Day With Meals.   12/5/2019 at Unknown time   • timolol (TIMOPTIC) 0.5 % ophthalmic solution Administer 1 drop into the left eye Every Morning.   12/5/2019 at Unknown time   • travoprost, JUDE free, (TRAVATAN Z) 0.004 % solution ophthalmic solution Administer 1 drop into the left eye Every Night.   12/5/2019 at Unknown time   • predniSONE (DELTASONE) 20 MG tablet  Take 4 tablets by mouth daily for 3 days, 2 tablets daily for 3 days, 1 tablet daily for 3 days then stop 11 tablet 0        Medicines:  Current Facility-Administered Medications   Medication Dose Route Frequency Provider Last Rate Last Dose   • acetaminophen (TYLENOL) tablet 650 mg  650 mg Oral Q4H PRN Fuentes Messina MD   650 mg at 12/10/19 2018    Or   • acetaminophen (TYLENOL) 160 MG/5ML solution 650 mg  650 mg Oral Q4H PRN Fuentes Messina MD        Or   • acetaminophen (TYLENOL) suppository 650 mg  650 mg Rectal Q4H PRN Fuentes Messina MD       • aspirin EC tablet 81 mg  81 mg Oral Daily Jaswant Villegas MD   81 mg at 12/11/19 0821   • calcitriol (ROCALTROL) capsule 0.25 mcg  0.25 mcg Oral Daily Ten Boyd MD   0.25 mcg at 12/11/19 0821   • calcium acetate (PHOSLO) capsule 667 mg  667 mg Oral TID With Meals Ten Boyd MD   667 mg at 12/11/19 0821   • carvedilol (COREG) tablet 6.25 mg  6.25 mg Oral BID With Meals Fuentes Messina MD   6.25 mg at 12/11/19 0821   • dextrose (D50W) 25 g/ 50mL Intravenous Solution 25 g  25 g Intravenous Q15 Min PRN Fuentes Messina MD       • dextrose (GLUTOSE) oral gel 15 g  15 g Oral Q15 Min PRN Fuentes Messina MD       • epoetin vika-epbx (RETACRIT) injection 10,000 Units  10,000 Units Subcutaneous Once per day on Mon Wed Fri Ten Boyd MD   10,000 Units at 12/11/19 0822   • famotidine (PEPCID) tablet 20 mg  20 mg Oral Daily Jaswant Villegas MD   20 mg at 12/11/19 0019   • gabapentin (NEURONTIN) capsule 100 mg  100 mg Oral Q12H Federico Clancy,  DO   100 mg at 19 0821   • glucagon (human recombinant) (GLUCAGEN DIAGNOSTIC) injection 1 mg  1 mg Subcutaneous Q15 Min PRN Fuentes Messina MD       • heparin (porcine) 5000 UNIT/ML injection 5,000 Units  5,000 Units Subcutaneous Q8H Fuentes Messina MD   5,000 Units at 19 0605   • insulin lispro (humaLOG) injection 2-7 Units  2-7 Units Subcutaneous 4x Daily With Meals & Nightly Fuentes Messina MD   2 Units at 19 0821   • melatonin tablet 3 mg  3 mg Oral Nightly PRN Jaswant Villegas MD   3 mg at 12/10/19 2134   • ondansetron (ZOFRAN) tablet 4 mg  4 mg Oral Q6H PRN Fuentes Messina MD        Or   • ondansetron (ZOFRAN) injection 4 mg  4 mg Intravenous Q6H PRN Fuentes Messina MD   4 mg at 19 1316   • pravastatin (PRAVACHOL) tablet 10 mg  10 mg Oral Nightly Jaswant Villegas MD   10 mg at 12/10/19 2010   • sodium chloride 0.9 % flush 10 mL  10 mL Intravenous PRN Daniela Esteban APRN       • sodium chloride 0.9 % flush 10 mL  10 mL Intravenous PRN Fuentes Messina MD           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    • Chronic otitis media    • Diabetes (CMS/HCC)    • Eustachian tube dysfunction    • Glaucoma    • HTN (hypertension)    • Kidney failure     on dialysis   • Liver disorder    • Mucoid otitis media        Past Surgical History:  Past Surgical History:   Procedure Laterality Date   • ARTERIOVENOUS FISTULA     • CATARACT EXTRACTION WITH INTRAOCULAR LENS IMPLANT     •  SECTION     • CHOLECYSTECTOMY     • MYRINGOTOMY W/ TUBES Bilateral    • MYRINGOTOMY W/ TUBES Right    • TUBAL ABDOMINAL LIGATION         Family History  Family History   Problem Relation Age of Onset   • Heart disease Mother        Social History  Social History     Socioeconomic History   • Marital status:      Spouse name: Not on file   • Number of children: Not on file   • Years of education:  Not on file   • Highest education level: Not on file   Tobacco Use   • Smoking status: Current Every Day Smoker     Packs/day: 0.50     Years: 6.00     Pack years: 3.00     Types: Cigarettes   • Smokeless tobacco: Never Used   Substance and Sexual Activity   • Alcohol use: No   • Drug use: No   • Sexual activity: Defer         Review of Systems:  History obtained from chart review and the patient  General ROS: No fever or chills  Respiratory ROS: No cough, shortness of breath, wheezing  Cardiovascular ROS: No chest pain or palpitations  Gastrointestinal ROS: No abdominal pain or melena  Genito-Urinary ROS: No dysuria or hematuria  Neurological ROS: no headache or dizziness    Objective:  Patient Vitals for the past 24 hrs:   BP Temp Temp src Pulse Resp SpO2 Weight   12/11/19 0700 159/69 97.5 °F (36.4 °C) Oral 73 16 99 % --   12/11/19 0500 110/50 98.4 °F (36.9 °C) Oral 69 16 99 % 57.4 kg (126 lb 8 oz)   12/10/19 2007 115/55 98.2 °F (36.8 °C) Oral 72 16 99 % --   12/10/19 1643 125/66 98 °F (36.7 °C) Oral 71 16 97 % --   12/10/19 1100 108/50 98.8 °F (37.1 °C) Oral 71 16 100 % --       Intake/Output Summary (Last 24 hours) at 12/11/2019 1037  Last data filed at 12/11/2019 0530  Gross per 24 hour   Intake 600 ml   Output 100 ml   Net 500 ml     General: awake/alert    Neck: supple, no JVD  Chest:  clear to auscultation bilaterally without respiratory distress  CVS: regular rate and rhythm  Abdominal: soft, nontender, positive bowel sounds  Extremities: no cyanosis or edema  Skin: warm and dry without rash  Neuro: no focal motor deficits    Labs:  Results from last 7 days   Lab Units 12/10/19  0642 12/06/19  1144   WBC 10*3/mm3 8.38 4.94   HEMOGLOBIN g/dL 8.5* 7.9*   HEMATOCRIT % 25.4* 23.8*   PLATELETS 10*3/mm3 296 278         Results from last 7 days   Lab Units 12/11/19  0936 12/10/19  0642 12/09/19  0359  12/06/19  1144   SODIUM mmol/L 127* 135* 139   < > 147*   POTASSIUM mmol/L 4.8 4.3 4.2   < > 5.3*   CHLORIDE  mmol/L 88* 97* 96*   < > 110*   CO2 mmol/L 23.0 25.0 24.0   < > 8.0*   BUN mg/dL 40* 24* 31*   < > 123*   CREATININE mg/dL 6.47* 4.81* 6.69*   < > 14.18*   CALCIUM mg/dL 9.0 9.3 9.0   < > 9.3   BILIRUBIN mg/dL  --   --   --   --  0.3   ALK PHOS U/L  --   --   --   --  66   ALT (SGPT) U/L  --   --   --   --  9   AST (SGOT) U/L  --   --   --   --  14   GLUCOSE mg/dL 194* 151* 100*   < > 72    < > = values in this interval not displayed.       Radiology:   Imaging Results (Last 72 Hours)     ** No results found for the last 72 hours. **          Culture:  No results found for: BLOODCX, URINECX, WOUNDCX, MRSACX, RESPCX, STOOLCX      Assessment   1.  End stage renal disease on hemodialysis  2.  Type 2 diabetes with nephropathy  3.  Essential hypertension  4.  Metabolic acidosis--improved  5.  Hyperkalemia--improved  6.  Anemia of CKD  7.  Medical noncompliance  8.  Hyponatremia     Plan:  1.  Dialysis today  2.  Dialysis chair time has been arranged at [facility]. If patient able to start outpatient dialysis tomorrow (12/12) then would be OK for discharge from renal standpoint today following dialysis.        Gomez Lemos, ANTHONY  12/11/2019  10:37 AM

## 2019-12-11 NOTE — DISCHARGE SUMMARY
[facility] Medicine Services  DISCHARGE SUMMARY       Date of Admission: 12/6/2019  Date of Discharge:  12/11/2019  Primary Care Physician: Ivan Kelly MD    Presenting Problem/History of Present Illness:  End stage renal failure on dialysis (CMS/HCC) [N18.6, Z99.2]     Final Discharge Diagnoses:  Active Hospital Problems    Diagnosis   • Anemia due to chronic kidney disease, on chronic dialysis (CMS/HCC)   • Acute pulmonary edema (CMS/HCC)   • Non-compliance with renal dialysis (CMS/HCC)   • Elevated troponin due to ESRD    • Hyperkalemia   • Encephalopathy, metabolic, due to uremia   • High anion gap metabolic acidosis due to uremia   • Type 2 diabetes mellitus, with long-term current use of insulin (CMS/HCC)   • End stage renal failure on dialysis (CMS/HCC)   • Diabetic nephropathy (CMS/HCC)   • HTN (hypertension)       Consults: Nephrology    Procedures Performed:   Imaging Results (All)     Procedure Component Value Units Date/Time    XR Chest 1 View [[id number]] Collected:  12/06/19 1332     Updated:  12/06/19 1339    Narrative:       XR CHEST 1 VW- 12/6/2019 1:13 PM CST     HISTORY: dyspnea       COMPARISON: Chest exam dated 09/03/2019.     FINDINGS:   Fairly extensive bibasilar lung consolidation with obscuration of both  hemidiaphragms and what appears to be a trace layering left pleural  effusion. Additional mild patchy infiltrate in the paramedian right  upper lobe. Bilateral diffuse coarsened interstitial markings with what  appears to be subpleural Kerley B lines in the right base. Underlying  mild cardiomegaly. No pneumothorax. Incidentally noted vascular stent in  the region of the left brachiocephalic vein. No acute bony abnormality.        Impression:       1. Bilateral interstitial and basilar consolidative infiltrates. There  appear to be subpleural Kerley B lines in the right base, suggesting a  volume overload with interstitial and casey  pulmonary edema. This seems  less likely infectious.        This report was finalized on 12/06/2019 13:35 by Dr Jhonatan Berry, .          Pertinent Test Results:   Lab Results (last 72 hours)     Procedure Component Value Units Date/Time    POC Glucose Once [[id number]]  (Abnormal) Collected:  12/11/19 1150    Specimen:  Blood Updated:  12/11/19 1202     Glucose 180 mg/dL      Comment: : [id number] Farzad Dumont ID: [id number]       Basic Metabolic Panel [[id number]]  (Abnormal) Collected:  12/11/19 0936    Specimen:  Blood Updated:  12/11/19 1000     Glucose 194 mg/dL      BUN 40 mg/dL      Creatinine 6.47 mg/dL      Sodium 127 mmol/L      Potassium 4.8 mmol/L      Chloride 88 mmol/L      CO2 23.0 mmol/L      Calcium 9.0 mg/dL      eGFR   Amer --     Comment: <15 Indicative of kidney failure.        eGFR Non African Amer 7 mL/min/1.73      Comment: <15 Indicative of kidney failure.        BUN/Creatinine Ratio 6.2     Anion Gap 16.0 mmol/L     Narrative:       GFR Normal >60  Chronic Kidney Disease <60  Kidney Failure <15      POC Glucose Once [[id number]]  (Abnormal) Collected:  12/11/19 0720    Specimen:  Blood Updated:  12/11/19 0732     Glucose 192 mg/dL      Comment: : [id number] Farzad Dumont ID: [id number]       POC Glucose Once [[id number]]  (Abnormal) Collected:  12/10/19 2004    Specimen:  Blood Updated:  12/10/19 2034     Glucose 225 mg/dL      Comment: : [id number] [facility]: [id number]       POC Glucose Once [[id number]]  (Abnormal) Collected:  12/10/19 1645    Specimen:  Blood Updated:  12/10/19 1656     Glucose 175 mg/dL      Comment: : [id number] Farzad Dumont ID: [id number]       POC Glucose Once [[id number]]  (Abnormal) Collected:  12/10/19 1150    Specimen:  Blood Updated:  12/10/19 1201     Glucose 189 mg/dL      Comment: : [id number] Farzad Dumont ID: [id number]       POC Glucose Once [[id number]]  (Normal) Collected:  12/10/19 0732    Specimen:   Blood Updated:  12/10/19 0743     Glucose 130 mg/dL      Comment: : [id number] Farzad Dumont ID: [id number]       Basic Metabolic Panel [[id number]]  (Abnormal) Collected:  12/10/19 0642    Specimen:  Blood Updated:  12/10/19 0711     Glucose 151 mg/dL      BUN 24 mg/dL      Creatinine 4.81 mg/dL      Sodium 135 mmol/L      Potassium 4.3 mmol/L      Chloride 97 mmol/L      CO2 25.0 mmol/L      Calcium 9.3 mg/dL      eGFR   Amer --     Comment: <15 Indicative of kidney failure.        eGFR Non African Amer 9 mL/min/1.73      Comment: <15 Indicative of kidney failure.        BUN/Creatinine Ratio 5.0     Anion Gap 13.0 mmol/L     Narrative:       GFR Normal >60  Chronic Kidney Disease <60  Kidney Failure <15      Hemoglobin A1c [[id number]]  (Normal) Collected:  12/10/19 0643    Specimen:  Blood Updated:  12/10/19 0704     Hemoglobin A1C 5.30 %     Narrative:       Hemoglobin A1C Ranges:    Increased Risk for Diabetes  5.7% to 6.4%  Diabetes                     >= 6.5%  Diabetic Goal                < 7.0%    CBC (No Diff) [[id number]]  (Abnormal) Collected:  12/10/19 0642    Specimen:  Blood Updated:  12/10/19 0654     WBC 8.38 10*3/mm3      RBC 2.71 10*6/mm3      Hemoglobin 8.5 g/dL      Hematocrit 25.4 %      MCV 93.7 fL      MCH 31.4 pg      MCHC 33.5 g/dL      RDW 13.9 %      RDW-SD 47.0 fl      MPV 9.1 fL      Platelets 296 10*3/mm3     POC Glucose Once [[id number]]  (Abnormal) Collected:  12/09/19 2038    Specimen:  Blood Updated:  12/09/19 2101     Glucose 236 mg/dL      Comment: : [id number] Greta MendezlyMeter ID: [id number]       POC Glucose Once [[id number]]  (Abnormal) Collected:  12/09/19 1618    Specimen:  Blood Updated:  12/09/19 1637     Glucose 231 mg/dL      Comment: : [id number] Phi TateinMeter ID: [id number]       POC Glucose Once [[id number]]  (Normal) Collected:  12/09/19 1226    Specimen:  Blood Updated:  12/09/19 1244     Glucose 107 mg/dL      Comment: : [id number] Lety  "SonamMeter ID: [id number]       POC Glucose Once [[id number]]  (Normal) Collected:  12/09/19 0725    Specimen:  Blood Updated:  12/09/19 0843     Glucose 110 mg/dL      Comment: : [id number] Andrés SoaresMeter ID: [id number]       Renal Function Panel [[id number]]  (Abnormal) Collected:  12/09/19 0359    Specimen:  Blood Updated:  12/09/19 0506     Glucose 100 mg/dL      BUN 31 mg/dL      Creatinine 6.69 mg/dL      Sodium 139 mmol/L      Potassium 4.2 mmol/L      Chloride 96 mmol/L      CO2 24.0 mmol/L      Calcium 9.0 mg/dL      Albumin 3.80 g/dL      Phosphorus 6.3 mg/dL      Anion Gap 19.0 mmol/L      BUN/Creatinine Ratio 4.6     eGFR Non African Amer 6 mL/min/1.73      Comment: <15 Indicative of kidney failure.        eGFR   Amer --     Comment: <15 Indicative of kidney failure.       Narrative:       GFR Normal >60  Chronic Kidney Disease <60  Kidney Failure <15      POC Glucose Once [[id number]]  (Abnormal) Collected:  12/08/19 2024    Specimen:  Blood Updated:  12/08/19 2044     Glucose 239 mg/dL      Comment: : [id number] Be AlyxMeter ID: [id number]       POC Glucose Once [[id number]]  (Abnormal) Collected:  12/08/19 1625    Specimen:  Blood Updated:  12/08/19 1725     Glucose 271 mg/dL      Comment: : [id number] Andrés SoaresMeter ID: [id number]             Chief Complaint on Day of Discharge: \"I'm ready to go home!\"    Hospital Course:  The patient is a 59 y.o. female who presented to [facility] with a chief complaint of shortness of breath.  Patient has a known history of end-stage renal disease presumably due to diabetes mellitus and hypertensive kidney disease that was supposed to be on dialysis 3 times per week but evidently stopped going to dialysis back in September 2019.  Records indicate that upon arrival patient was having symptoms of shortness of breath, orthopnea, and PND.  Patient was in agreement to restart dialysis.  Nephrology was consulted.  On arrival " her BUN was found to be 123 with a creatinine of 14.18.  Chest x-ray upon arrival was consistent with pulmonary edema.  Patient was admitted to the hospitalist service with close consultation with nephrology who followed along with us during this hospitalization.    Patient has an AV fistula in her left upper extremity which was used for dialysis.  She has been tolerating dialysis without problem.  She reports that her shortness of breath symptoms have significantly improved.  She is now maintaining O2 saturations near 100% on room air.  Nephrology is cleared patient for discharge later today following dialysis.  It has taken us some time, however we have been able to arrange a dialysis chair at the [place] dialysis clinic with her next session scheduled for tomorrow at 12:25 PM.  She will be on dialysis on a Tuesday, Thursday, and Saturday regimen.  She is agreeable to this plan.  We discussed the importance of compliance with her dialysis therapy, and she is agreeable to doing this moving forward.    I inquired today if she needs any new prescriptions for home medications, and she reports that she has all of her needed medications at home.  She reports that she takes Lantus 10 units subcutaneously once daily.  She reports that she has a glucometer in addition to her diabetic testing supplies.    She has been working with physical therapy given generalized weakness that she was experiencing upon arrival.  It was noted that patient did have a postural sway when standing and some unsteadiness when turning in the hallway.  Physical therapy did recommend that she would benefit from ambulating with a rolling walker at home and in the community upon discharge.  I will write a prescription for a rolling walker for her to be used on an outpatient basis.  I talked with her at length about outpatient physical therapy versus home health services.  Patient reports that she does not feel like that she will need any physical  "therapy at this time.  She feels like that she is getting stronger on a daily basis.  I informed her that if she gets home, changes her mind, and believes that she would benefit from physical therapy moving forward to please not hesitate contact us.  She reports that she will be compliant with this plan.    Condition on Discharge:  Medically stable  Physical Exam on Discharge:  /64 (BP Location: Right arm, Patient Position: Lying)   Pulse 79   Temp 97.2 °F (36.2 °C) (Oral)   Resp 16   Ht 149.9 cm (59.02\")   Wt 57.4 kg (126 lb 8 oz)   SpO2 100%   BMI 25.54 kg/m²   Physical Exam  No acute distress, sitting up in bed, nonlabored breathing, lungs clear, no supplemental oxygen requirement, no accessory muscle use, no work of breathing, patient is alert and oriented, pleasant to talk with, very eager to get home!    Discharge Disposition:  Home or Self Care    Discharge Medications:     Discharge Medications      New Medications      Instructions Start Date   carvedilol 6.25 MG tablet  Commonly known as:  COREG   6.25 mg, Oral, 2 Times Daily With Meals         Changes to Medications      Instructions Start Date   losartan 25 MG tablet  Commonly known as:  COZAAR  What changed:  how much to take   25 mg, Oral, Daily         Continue These Medications      Instructions Start Date   aspirin 81 MG EC tablet   81 mg, Oral, Daily      brimonidine 0.2 % ophthalmic solution  Commonly known as:  ALPHAGAN   1 drop, Left Eye, 3 Times Daily      calcitriol 0.25 MCG capsule  Commonly known as:  ROCALTROL   0.25 mcg, Oral, Take As Directed, Only given on dialysis days (Tuesday, Thursday, Saturday)      cetirizine 10 MG tablet  Commonly known as:  zyrTEC   10 mg, Oral, Nightly PRN      cholecalciferol 25 MCG (1000 UT) tablet  Commonly known as:  VITAMIN D3   1,000 Units, Oral, Daily      dorzolamide 2 % ophthalmic solution  Commonly known as:  TRUSOPT   1 drop, Left Eye, 2 Times Daily      famotidine 20 MG tablet  Commonly " known as:  PEPCID   20 mg, Oral, 2 Times Daily      fluticasone 50 MCG/ACT nasal spray  Commonly known as:  FLONASE   1 spray, Nasal, 2 Times Daily      homatropine 5 % ophthalmic solution   1 drop, Left Eye, Daily      insulin glargine 100 UNIT/ML injection  Commonly known as:  LANTUS   10 Units, Subcutaneous, Nightly      lidocaine-prilocaine 2.5-2.5 % cream  Commonly known as:  EMLA   1 application, Topical, Take As Directed, Apply to access area 30 minutes prior to dialysis.      midodrine 5 MG tablet  Commonly known as:  PROAMATINE   5 mg, Oral, Take As Directed, as needed for low blood pressure during dialysis.       pravastatin 10 MG tablet  Commonly known as:  PRAVACHOL   10 mg, Oral, Nightly      RHOPRESSA 0.02 % solution  Generic drug:  Netarsudil Dimesylate   1 drop, Left Eye, Nightly      sevelamer 800 MG tablet  Commonly known as:  RENAGEL   1,600 mg, Oral, 3 Times Daily With Meals      timolol 0.5 % ophthalmic solution  Commonly known as:  TIMOPTIC   1 drop, Left Eye, Every Morning      TRAVATAN Z 0.004 % solution ophthalmic solution  Generic drug:  travoprost (BAK free)   1 drop, Left Eye, Nightly         Stop These Medications    benzonatate 100 MG capsule  Commonly known as:  TESSALON     guaiFENesin 600 MG 12 hr tablet  Commonly known as:  MUCINEX     predniSONE 20 MG tablet  Commonly known as:  DELTASONE     sertraline 25 MG tablet  Commonly known as:  ZOLOFT            Discharge Diet: diabetic, renal diet    Activity at Discharge:  please ambulate with rolling walker    Follow-up Appointments:   Dr. Kelly is listed as patient's primary care provider, however he no longer lives in this area.  We will make arrangements for her to establish with a new primary care provider at [facility] -Dr. Saucedo, Dr. Sheldon, or Dr. Ferrera.    Test Results Pending at Discharge: none    Jaswant Villegas MD  12/11/19  12:03 PM    Time: 25 min

## 2019-12-11 NOTE — PROGRESS NOTES
Continued Stay Note   [place]     Patient Name: Mini Gentile  MRN: [id number]  Today's Date: 12/11/2019    Admit Date: 12/6/2019    Discharge Plan     Row Name 12/11/19 1414       Plan    Plan  Home    Provided post acute provider list?  Yes    Final Discharge Disposition Code  01 - home or self-care    Final Note  Pt is being discharged home.  Pt has RW ordered for home use and she prefers this come from [facility]. Spoke with Mehdi from [facility] and provided referral, they will deliver to her room. Suma Harrington from [facility] aware of d/c home today.         Discharge Codes    No documentation.       Expected Discharge Date and Time     Expected Discharge Date Expected Discharge Time    Dec 11, 2019             SACHI Douglas

## 2019-12-11 NOTE — DISCHARGE PLACEMENT REQUEST
"Deisy Forte [phone]  VamshihiMini mariscal (59 y.o. Female)     Date of Birth Social Security Number Address Home Phone MRN    1960  [address] [place] [address] [phone] [id number]    Mu-ism Marital Status          Other        Admission Date Admission Type Admitting Provider Attending Provider Department, Room/Bed    12/6/19 Emergency Jaswant Villegas MD Thompson, Benjamin H, MD [address], 462/1    Discharge Date Discharge Disposition Discharge Destination         Home or Self Care              Attending Provider:  Jaswant Villegas MD    Allergies:  Bupropion, Chantix [Varenicline], Sulfa Antibiotics, Azithromycin, Levofloxacin, Penicillins    Isolation:  None   Infection:  None   Code Status:  CPR    Ht:  149.9 cm (59.02\")   Wt:  57.4 kg (126 lb 8 oz)    Admission Cmt:  None   Principal Problem:  None                Active Insurance as of 12/6/2019     Primary Coverage     Payor Plan Insurance Group Employer/Plan Group    MEDICARE MEDICARE A & B      Payor Plan Address Payor Plan Phone Number Payor Plan Fax Number Effective Dates    [address] [phone]  7/1/2017 - None Entered    [facility] [place] [address]       Subscriber Name Subscriber Birth Date Member ID       MINI PEÑA 1960 [id number]           Secondary Coverage     Payor Plan Insurance Group Employer/Plan Group    [employer]LL[employer] WE[place]ARE [place]ICAID      Payor Plan Address Payor Plan Phone Number Payor Plan Fax Number Effective Dates    [address] [address] [phone]  11/4/2016 - None Entered    [facility] [id number]       Subscriber Name Subscriber Birth Date Member ID       MINI PEÑA 1960 [id number]                 Emergency Contacts      (Rel.) Home Phone Work Phone Mobile Phone    Erica Bowling (Daughter) -- -- [phone]        [address] [address]  Dept. Phone:  [phone]  Dept. Fax:   Date Ordered: Dec " "2019         Patient:  Mini Gentile MRN:  [id number]   [address] [place] [id number] :  1960  SSN:    Phone: [phone] Sex:  F     Weight: 57.4 kg (126 lb 8 oz)         Ht Readings from Last 1 Encounters:   19 149.9 cm (59.02\")         Walker               (Order ID: [id number])    Diagnosis:  Impaired mobility (Z74.09 [ICD-10-CM] 799.89 [ICD-9-CM])   Quantity:  1     Equipment:  Walker Folding with Wheels  Length of Need (99 Months = Lifetime): 99 Months = Lifetime        Authorizing Provider's Phone: [phone]   Authorizing Provider:Jaswant Villegas MD  Authorizing Provider's NPI: [id number]  Order Entered By: Jaswant Villegas MD 2019 12:10 PM     Electronically signed by: Jaswant Villegas MD 2019 12:10 PM          [address] [address]  Dept. Phone:  [phone]  Dept. Fax:   Date Ordered: Dec 11, 2019         Patient:  Mini Gentile MRN:  [id number]   [address] [place] [address] :  1960  SSN:    Phone: [phone] Sex:  F     Weight: 57.4 kg (126 lb 8 oz)         Ht Readings from Last 1 Encounters:   19 149.9 cm (59.02\")         Walker               (Order ID: [id number])    Diagnosis:  Impaired mobility (Z74.09 [ICD-10-CM] 799.89 [ICD-9-CM])   Quantity:  1     Equipment:  Walker Folding with Wheels  Length of Need (99 Months = Lifetime): 99 Months = Lifetime        Authorizing Provider's Phone: [phone]   Authorizing Provider:Jaswant Villegas MD  Authorizing Provider's NPI: [id number]  Order Entered By: Jaswant Villegas MD 2019 12:10 PM     Electronically signed by: Jaswant Villegas MD 2019 12:10 PM        [address] [address]  Dept. Phone:  [phone]  Dept. Fax:   Date Ordered: Dec 11, 2019         Patient:  Mini Gentile MRN:  [id number]   [address]  [facility] " "[place] [address] :  1960  SSN:    Phone: [phone] Sex:  F     Weight: 57.4 kg (126 lb 8 oz)         Ht Readings from Last 1 Encounters:   19 149.9 cm (59.02\")         Walker               (Order ID: [id number])    Diagnosis:  Impaired mobility (Z74.09 [ICD-10-CM] 799.89 [ICD-9-CM])   Quantity:  1     Equipment:  Walker Folding with Wheels  Length of Need (99 Months = Lifetime): 99 Months = Lifetime        Authorizing Provider's Phone: [phone]   Authorizing Provider:Jaswant Villegas MD  Authorizing Provider's NPI: [id number]  Order Entered By: Jaswant Villegas MD 2019 12:10 PM     Electronically signed by: Jaswant Villegas MD 2019 12:10 PM             History & Physical      Fuentes Messina MD at 19 1801              [facility] Medicine Services  HISTORY AND PHYSICAL    Date of Admission: 2019  Primary Care Physician: Ivan Kelly MD    Subjective     Chief Complaint: shortness of breath    History of Present Illness    Mrs. Gentile is a 60 yo F with a past medical history of ESRD due to DM and hypertensive kidney disease.  Patient was supposed to be on dialysis three times weekly but has not had dialysis since late 2019.  Patient still makes urine.  Patient has been having worsening exertional dyspnea, orthopnea, and PND.  Patient indicates that she stopped dialysis due to disagreements with the dialysis \"people\"    Patient was seen and evaluated while on dialysis.    Review of Systems   Constitutional: Positive for activity change, fatigue and unexpected weight change. Negative for chills and fever.   Respiratory: Positive for shortness of breath. Negative for cough.    Cardiovascular: Positive for leg swelling. Negative for chest pain and palpitations.   Gastrointestinal: Negative for abdominal distention, abdominal pain, diarrhea, nausea and vomiting.   Genitourinary: Positive for " decreased urine volume.   All other systems reviewed and are negative.       Otherwise complete ROS reviewed and negative except as mentioned in the HPI.    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 Her smoking use included cigarettes. She smoked 2.00 packs per day. She has never used smokeless tobacco. She reports that she uses drugs. Drug: Marijuana. Frequency: 1.00 time per week. She reports that she does not drink alcohol.    Family History: family history includes Heart disease in her mother.       Allergies:  Allergies   Allergen Reactions   • Bupropion Nausea Only   • Chantix [Varenicline]    • Sulfa Antibiotics    • Azithromycin Rash   • Levofloxacin Rash   • Penicillins Rash     Medications:  Prior to Admission medications    Medication Sig Start Date End Date Taking? Authorizing Provider   aspirin 81 MG EC tablet Take 81 mg by mouth Daily.    Provider, MD Steve   benzonatate (TESSALON) 100 MG capsule Take 1 capsule by mouth 3 (Three) Times a Day As Needed for Cough. 19   Guru Zacarias APRN   brimonidine (ALPHAGAN) 0.2 % ophthalmic solution Administer 1 drop into the left eye 3 (Three) Times a Day.    Provider, MD Steve   calcitriol (ROCALTROL) 0.25 MCG capsule Take 0.25 mcg by mouth Take As Directed. Only given on dialysis days (Tuesday,  Thursday, Saturday)    Steve Warren MD   cetirizine (zyrTEC) 10 MG tablet Take 10 mg by mouth At Night As Needed (allergies).    Steve Warren MD   cholecalciferol (VITAMIN D3) 1000 units tablet Take 1,000 Units by mouth Daily.    Steve Warren MD   dorzolamide (TRUSOPT) 2 % ophthalmic solution Administer 1 drop into the left eye 2 (Two) Times a Day.    Steve Warren MD   famotidine (PEPCID) 20 MG tablet Take 20 mg by mouth 2 (Two) Times a Day.    Steve Warren MD   fluticasone (FLONASE) 50 MCG/ACT nasal spray 1 spray into the nostril(s) as directed by provider 2 (Two) Times a Day.    Steve Warren MD   guaiFENesin (MUCINEX) 600 MG 12 hr tablet Take 2 tablets by mouth Every 12 (Twelve) Hours. 9/5/19   Guru Zacarias APRN   homatropine 5 % ophthalmic solution Administer 1 drop into the left eye Daily.    Steve Warren MD   insulin glargine (LANTUS) 100 UNIT/ML injection Inject 10 Units under the skin Every Night.    Steve Warren MD   lidocaine-prilocaine (EMLA) 2.5-2.5 % cream Apply 1 application topically to the appropriate area as directed Take As Directed. Apply to access area 30 minutes prior to dialysis.     Steve Warren MD   losartan (COZAAR) 25 MG tablet Take 25 mg by mouth Daily.    Steve Warren MD   midodrine (PROAMATINE) 5 MG tablet Take 5 mg by mouth Take As Directed. as needed for low blood pressure during dialysis.    Steve Warren MD   Netarsudil Dimesylate (RHOPRESSA) 0.02 % solution Administer 1 drop into the left eye Every Night.    Steve Warren MD   pravastatin (PRAVACHOL) 10 MG tablet Take 10 mg by mouth Every Night.    Steve Warren MD   predniSONE (DELTASONE) 20 MG tablet  Take 4 tablets by mouth daily for 3 days, 2 tablets daily for 3 days, 1 tablet daily for 3 days then stop 9/5/19   Guru Zacarias APRN   sertraline (ZOLOFT) 25 MG tablet Take 25 mg by mouth Every Night.     "ProviderSteve MD   sevelamer (RENAGEL) 800 MG tablet Take 1,600 mg by mouth 3 (Three) Times a Day With Meals.    Steve Warren MD   timolol (TIMOPTIC) 0.5 % ophthalmic solution Administer 1 drop into the left eye Every Morning.    Steve Warren MD   travoprost, JUDE free, (TRAVATAN Z) 0.004 % solution ophthalmic solution Administer 1 drop into the left eye Every Night.    Steve Warren MD     Objective     Vital Signs: /71   Pulse 95   Temp 97.8 °F (36.6 °C) (Oral)   Resp 16   Ht 149.9 cm (59\")   Wt 61.2 kg (135 lb)   SpO2 96%   BMI 27.27 kg/m²    Physical Exam   Constitutional: She is oriented to person, place, and time. No distress.   HENT:   Head: Normocephalic and atraumatic.   Eyes: Conjunctivae are normal.   Neck: Neck supple. No JVD present.   Cardiovascular: Normal rate and regular rhythm.   Murmur heard.  Pulmonary/Chest: Effort normal. She has rales.   Abdominal: Soft. Bowel sounds are normal. She exhibits no distension. There is tenderness.   Musculoskeletal: She exhibits edema.   Neurological: She is alert and oriented to person, place, and time.   Skin: Skin is warm and dry. She is not diaphoretic. No erythema.   Psychiatric: She has a normal mood and affect. Her behavior is normal.   Nursing note and vitals reviewed.          Results Reviewed:  Lab Results (last 24 hours)     Procedure Component Value Units Date/Time    POC Occult Blood Stool [[id number]]  (Normal) Collected:  12/06/19 1250    Specimen:  Stool Updated:  12/06/19 1330     Fecal Occult Blood Negative     Lot Number [id number]     Expiration Date 05/31/2020     DEVELOPER LOT NUMBER [id number]     DEVELOPER EXPIRATION DATE 05/31/2020     Positive Control Positive     Negative Control Negative    BNP [[id number]]  (Abnormal) Collected:  12/06/19 1144    Specimen:  Blood Updated:  12/06/19 1301     proBNP 40,279.0 pg/mL     Narrative:       Among patients with dyspnea, NT-proBNP is highly sensitive for the " detection of acute congestive heart failure. In addition NT-proBNP of <300 pg/ml effectively rules out acute congestive heart failure with 99% negative predictive value.    Troponin [[id number]]  (Abnormal) Collected:  12/06/19 1144    Specimen:  Blood Updated:  12/06/19 1255     Troponin T 0.047 ng/mL     Narrative:       Troponin T Reference Range:  <= 0.03 ng/mL-   Negative for AMI  >0.03 ng/mL-     Abnormal for myocardial necrosis.  Clinicians would have to utilize clinical acumen, EKG, Troponin and serial changes to determine if it is an Acute Myocardial Infarction or myocardial injury due to an underlying chronic condition.     Temple Draw [[id number]] Collected:  12/06/19 1144    Specimen:  Blood Updated:  12/06/19 1245    Narrative:       The following orders were created for panel order Temple Draw.  Procedure                               Abnormality         Status                     ---------                               -----------         ------                     Light Blue Top[[id number]]                                   Final result               Green Top (Gel)[[id number]]                                  Final result               Lavender Top[[id number]]                                     Final result               Red Top[[id number]]                                          Final result                 Please view results for these tests on the individual orders.    Light Blue Top [[id number]] Collected:  12/06/19 1144    Specimen:  Blood Updated:  12/06/19 1245     Extra Tube hold for add-on     Comment: Auto resulted       Green Top (Gel) [[id number]] Collected:  12/06/19 1144    Specimen:  Blood Updated:  12/06/19 1245     Extra Tube Hold for add-ons.     Comment: Auto resulted.       Lavender Top [[id number]] Collected:  12/06/19 1144    Specimen:  Blood Updated:  12/06/19 1245     Extra Tube hold for add-on     Comment: Auto resulted       Red Top [[id number]] Collected:  12/06/19 1144     Specimen:  Blood Updated:  12/06/19 1245     Extra Tube Hold for add-ons.     Comment: Auto resulted.       Protime-INR [[id number]]  (Abnormal) Collected:  12/06/19 1144    Specimen:  Blood Updated:  12/06/19 1241     Protime 15.5 Seconds      INR 1.19    Comprehensive Metabolic Panel [[id number]]  (Abnormal) Collected:  12/06/19 1144    Specimen:  Blood Updated:  12/06/19 1229     Glucose 72 mg/dL       mg/dL      Creatinine 14.18 mg/dL      Sodium 147 mmol/L      Potassium 5.3 mmol/L      Chloride 110 mmol/L      CO2 8.0 mmol/L      Calcium 9.3 mg/dL      Total Protein 7.6 g/dL      Albumin 4.40 g/dL      ALT (SGPT) 9 U/L      AST (SGOT) 14 U/L      Alkaline Phosphatase 66 U/L      Total Bilirubin 0.3 mg/dL      eGFR Non African Amer 3 mL/min/1.73      Comment: <15 Indicative of kidney failure.        eGFR   Amer --     Comment: <15 Indicative of kidney failure.        Globulin 3.2 gm/dL      A/G Ratio 1.4 g/dL      BUN/Creatinine Ratio 8.7     Anion Gap 29.0 mmol/L     Narrative:       GFR Normal >60  Chronic Kidney Disease <60  Kidney Failure <15    CBC & Differential [[id number]] Collected:  12/06/19 1144    Specimen:  Blood Updated:  12/06/19 1159    Narrative:       The following orders were created for panel order CBC & Differential.  Procedure                               Abnormality         Status                     ---------                               -----------         ------                     CBC Auto Differential[[id number]]        Abnormal            Final result                 Please view results for these tests on the individual orders.    CBC Auto Differential [[id number]]  (Abnormal) Collected:  12/06/19 1144    Specimen:  Blood Updated:  12/06/19 1159     WBC 4.94 10*3/mm3      RBC 2.47 10*6/mm3      Hemoglobin 7.9 g/dL      Hematocrit 23.8 %      MCV 96.4 fL      MCH 32.0 pg      MCHC 33.2 g/dL      RDW 14.5 %      RDW-SD 51.3 fl      MPV 9.2 fL      Platelets 278 10*3/mm3       Neutrophil % 67.1 %      Lymphocyte % 21.5 %      Monocyte % 5.9 %      Eosinophil % 4.5 %      Basophil % 0.6 %      Immature Grans % 0.4 %      Neutrophils, Absolute 3.32 10*3/mm3      Lymphocytes, Absolute 1.06 10*3/mm3      Monocytes, Absolute 0.29 10*3/mm3      Eosinophils, Absolute 0.22 10*3/mm3      Basophils, Absolute 0.03 10*3/mm3      Immature Grans, Absolute 0.02 10*3/mm3      nRBC 0.4 /100 WBC         Imaging Results (Last 24 Hours)     Procedure Component Value Units Date/Time    XR Chest 1 View [[id number]] Collected:  12/06/19 1332     Updated:  12/06/19 1339    Narrative:       XR CHEST 1 VW- 12/6/2019 1:13 PM CST     HISTORY: dyspnea       COMPARISON: Chest exam dated 09/03/2019.     FINDINGS:   Fairly extensive bibasilar lung consolidation with obscuration of both  hemidiaphragms and what appears to be a trace layering left pleural  effusion. Additional mild patchy infiltrate in the paramedian right  upper lobe. Bilateral diffuse coarsened interstitial markings with what  appears to be subpleural Kerley B lines in the right base. Underlying  mild cardiomegaly. No pneumothorax. Incidentally noted vascular stent in  the region of the left brachiocephalic vein. No acute bony abnormality.        Impression:       1. Bilateral interstitial and basilar consolidative infiltrates. There  appear to be subpleural Kerley B lines in the right base, suggesting a  volume overload with interstitial and casey pulmonary edema. This seems  less likely infectious.        This report was finalized on 12/06/2019 13:35 by Dr Jhonatan Berry, .        I have personally reviewed and interpreted the radiology studies and ECG obtained at time of admission.     Assessment / Plan     Assessment:   Active Hospital Problems    Diagnosis   • Anemia due to chronic kidney disease, on chronic dialysis (CMS/HCC)   • Acute pulmonary edema (CMS/HCC)   • Non-compliance with renal dialysis (CMS/HCC)   • Elevated troponin due to ESRD    •  "Hyperkalemia   • Encephalopathy, metabolic, due to uremia   • High anion gap metabolic acidosis due to uremia   • Type 2 diabetes mellitus, with long-term current use of insulin (CMS/[facility])   • End stage renal failure on dialysis (CMS/[facility])   • Diabetic nephropathy (CMS/HCC)   • HTN (hypertension)         Plan:   1.  Admit to medicine  2.  Consult nephrology  3.  Dialysis per nephrology  4.  Resume home medications as appropriate  5.  Heparin DVT PPx  6.  Echo   7.  SW consult for \"home dialysis\" per patient request  8.  AM renal function   9.  Resume home medications when appropriate, patient unable to reconcile medication as she is encephalopathic      Code Status: Full    Son, reymundo to make decisions if patient unable     I discussed the patient's findings and my recommendations with the patient    Estimated length of stay 2-4    Fuentes Messina MD   12/06/19   6:01 PM            Electronically signed by Fuentes Messina MD at 12/06/19 1952          Discharge Summary      Jaswant Villegas MD at 12/11/19 1203              [facility] Medicine Services  DISCHARGE SUMMARY       Date of Admission: 12/6/2019  Date of Discharge:  12/11/2019  Primary Care Physician: Ivan Kelly MD    Presenting Problem/History of Present Illness:  End stage renal failure on dialysis (CMS/[facility]) [N18.6, Z99.2]     Final Discharge Diagnoses:  Active Hospital Problems    Diagnosis   • Anemia due to chronic kidney disease, on chronic dialysis (CMS/[facility])   • Acute pulmonary edema (CMS/[facility])   • Non-compliance with renal dialysis (CMS/[facility])   • Elevated troponin due to ESRD    • Hyperkalemia   • Encephalopathy, metabolic, due to uremia   • High anion gap metabolic acidosis due to uremia   • Type 2 diabetes mellitus, with long-term current use of insulin (CMS/[facility])   • End stage renal failure on dialysis (CMS/[facility])   • Diabetic nephropathy (CMS/[facility])   • HTN (hypertension)       Consults: " Nephrology    Procedures Performed:   Imaging Results (All)     Procedure Component Value Units Date/Time    XR Chest 1 View [[id number]] Collected:  12/06/19 1332     Updated:  12/06/19 1339    Narrative:       XR CHEST 1 VW- 12/6/2019 1:13 PM CST     HISTORY: dyspnea       COMPARISON: Chest exam dated 09/03/2019.     FINDINGS:   Fairly extensive bibasilar lung consolidation with obscuration of both  hemidiaphragms and what appears to be a trace layering left pleural  effusion. Additional mild patchy infiltrate in the paramedian right  upper lobe. Bilateral diffuse coarsened interstitial markings with what  appears to be subpleural Kerley B lines in the right base. Underlying  mild cardiomegaly. No pneumothorax. Incidentally noted vascular stent in  the region of the left brachiocephalic vein. No acute bony abnormality.        Impression:       1. Bilateral interstitial and basilar consolidative infiltrates. There  appear to be subpleural Kerley B lines in the right base, suggesting a  volume overload with interstitial and casey pulmonary edema. This seems  less likely infectious.        This report was finalized on 12/06/2019 13:35 by Dr Jhonatan Berry, .          Pertinent Test Results:   Lab Results (last 72 hours)     Procedure Component Value Units Date/Time    POC Glucose Once [[id number]]  (Abnormal) Collected:  12/11/19 1150    Specimen:  Blood Updated:  12/11/19 1202     Glucose 180 mg/dL      Comment: : [id number] Dumont ID: [id number]       Basic Metabolic Panel [[id number]]  (Abnormal) Collected:  12/11/19 0936    Specimen:  Blood Updated:  12/11/19 1000     Glucose 194 mg/dL      BUN 40 mg/dL      Creatinine 6.47 mg/dL      Sodium 127 mmol/L      Potassium 4.8 mmol/L      Chloride 88 mmol/L      CO2 23.0 mmol/L      Calcium 9.0 mg/dL      eGFR   Amer --     Comment: <15 Indicative of kidney failure.        eGFR Non African Amer 7 mL/min/1.73      Comment: <15 Indicative of kidney  failure.        BUN/Creatinine Ratio 6.2     Anion Gap 16.0 mmol/L     Narrative:       GFR Normal >60  Chronic Kidney Disease <60  Kidney Failure <15      POC Glucose Once [[id number]]  (Abnormal) Collected:  12/11/19 0720    Specimen:  Blood Updated:  12/11/19 0732     Glucose 192 mg/dL      Comment: : [id number] Farzad ShorteMeter ID: [id number]       POC Glucose Once [[id number]]  (Abnormal) Collected:  12/10/19 2004    Specimen:  Blood Updated:  12/10/19 2034     Glucose 225 mg/dL      Comment: : [id number] Be [facility] ID: [id number]       POC Glucose Once [[id number]]  (Abnormal) Collected:  12/10/19 1645    Specimen:  Blood Updated:  12/10/19 1656     Glucose 175 mg/dL      Comment: : [id number] Farzad ShorteMeter ID: [id number]       POC Glucose Once [[id number]]  (Abnormal) Collected:  12/10/19 1150    Specimen:  Blood Updated:  12/10/19 1201     Glucose 189 mg/dL      Comment: : [id number] Farzad ShorteMeter ID: [id number]       POC Glucose Once [[id number]]  (Normal) Collected:  12/10/19 0732    Specimen:  Blood Updated:  12/10/19 0743     Glucose 130 mg/dL      Comment: : [id number] Farzad ShorteMeter ID: [id number]       Basic Metabolic Panel [[id number]]  (Abnormal) Collected:  12/10/19 0642    Specimen:  Blood Updated:  12/10/19 0711     Glucose 151 mg/dL      BUN 24 mg/dL      Creatinine 4.81 mg/dL      Sodium 135 mmol/L      Potassium 4.3 mmol/L      Chloride 97 mmol/L      CO2 25.0 mmol/L      Calcium 9.3 mg/dL      eGFR   Amer --     Comment: <15 Indicative of kidney failure.        eGFR Non African Amer 9 mL/min/1.73      Comment: <15 Indicative of kidney failure.        BUN/Creatinine Ratio 5.0     Anion Gap 13.0 mmol/L     Narrative:       GFR Normal >60  Chronic Kidney Disease <60  Kidney Failure <15      Hemoglobin A1c [[id number]]  (Normal) Collected:  12/10/19 0643    Specimen:  Blood Updated:  12/10/19 0704     Hemoglobin A1C 5.30 %     Narrative:       Hemoglobin  A1C Ranges:    Increased Risk for Diabetes  5.7% to 6.4%  Diabetes                     >= 6.5%  Diabetic Goal                < 7.0%    CBC (No Diff) [[id number]]  (Abnormal) Collected:  12/10/19 0642    Specimen:  Blood Updated:  12/10/19 0654     WBC 8.38 10*3/mm3      RBC 2.71 10*6/mm3      Hemoglobin 8.5 g/dL      Hematocrit 25.4 %      MCV 93.7 fL      MCH 31.4 pg      MCHC 33.5 g/dL      RDW 13.9 %      RDW-SD 47.0 fl      MPV 9.1 fL      Platelets 296 10*3/mm3     POC Glucose Once [[id number]]  (Abnormal) Collected:  12/09/19 2038    Specimen:  Blood Updated:  12/09/19 2101     Glucose 236 mg/dL      Comment: : [id number] Greta MendezlyMeter ID: [id number]       POC Glucose Once [[id number]]  (Abnormal) Collected:  12/09/19 1618    Specimen:  Blood Updated:  12/09/19 1637     Glucose 231 mg/dL      Comment: : [id number] Phi TateinMeter ID: [id number]       POC Glucose Once [[id number]]  (Normal) Collected:  12/09/19 1226    Specimen:  Blood Updated:  12/09/19 1244     Glucose 107 mg/dL      Comment: : [id number] Lety MastersMeter ID: [id number]       POC Glucose Once [[id number]]  (Normal) Collected:  12/09/19 0725    Specimen:  Blood Updated:  12/09/19 0843     Glucose 110 mg/dL      Comment: : [id number] Andrés SoaresMeter ID: [id number]       Renal Function Panel [[id number]]  (Abnormal) Collected:  12/09/19 0359    Specimen:  Blood Updated:  12/09/19 0506     Glucose 100 mg/dL      BUN 31 mg/dL      Creatinine 6.69 mg/dL      Sodium 139 mmol/L      Potassium 4.2 mmol/L      Chloride 96 mmol/L      CO2 24.0 mmol/L      Calcium 9.0 mg/dL      Albumin 3.80 g/dL      Phosphorus 6.3 mg/dL      Anion Gap 19.0 mmol/L      BUN/Creatinine Ratio 4.6     eGFR Non African Amer 6 mL/min/1.73      Comment: <15 Indicative of kidney failure.        eGFR   Amer --     Comment: <15 Indicative of kidney failure.       Narrative:       GFR Normal >60  Chronic Kidney Disease <60  Kidney Failure <15      POC  "Glucose Once [[id number]]  (Abnormal) Collected:  12/08/19 2024    Specimen:  Blood Updated:  12/08/19 2044     Glucose 239 mg/dL      Comment: : [id number] Be WisdomMeter ID: [id number]       POC Glucose Once [[id number]]  (Abnormal) Collected:  12/08/19 1625    Specimen:  Blood Updated:  12/08/19 1725     Glucose 271 mg/dL      Comment: : [id number] Andrés SoaresMeter ID: [id number]             Chief Complaint on Day of Discharge: \"I'm ready to go home!\"    Hospital Course:  The patient is a 59 y.o. female who presented to [facility] with a chief complaint of shortness of breath.  Patient has a known history of end-stage renal disease presumably due to diabetes mellitus and hypertensive kidney disease that was supposed to be on dialysis 3 times per week but evidently stopped going to dialysis back in September 2019.  Records indicate that upon arrival patient was having symptoms of shortness of breath, orthopnea, and PND.  Patient was in agreement to restart dialysis.  Nephrology was consulted.  On arrival her BUN was found to be 123 with a creatinine of 14.18.  Chest x-ray upon arrival was consistent with pulmonary edema.  Patient was admitted to the hospitalist service with close consultation with nephrology who followed along with us during this hospitalization.    Patient has an AV fistula in her left upper extremity which was used for dialysis.  She has been tolerating dialysis without problem.  She reports that her shortness of breath symptoms have significantly improved.  She is now maintaining O2 saturations near 100% on room air.  Nephrology is cleared patient for discharge later today following dialysis.  It has taken us some time, however we have been able to arrange a dialysis chair at the [facility] with her next session scheduled for tomorrow at 12:25 PM.  She will be on dialysis on a Tuesday, Thursday, and Saturday regimen.  She is agreeable to this plan.  We " "discussed the importance of compliance with her dialysis therapy, and she is agreeable to doing this moving forward.    I inquired today if she needs any new prescriptions for home medications, and she reports that she has all of her needed medications at home.  She reports that she takes Lantus 10 units subcutaneously once daily.  She reports that she has a glucometer in addition to her diabetic testing supplies.    She has been working with physical therapy given generalized weakness that she was experiencing upon arrival.  It was noted that patient did have a postural sway when standing and some unsteadiness when turning in the hallway.  Physical therapy did recommend that she would benefit from ambulating with a rolling walker at home and in the community upon discharge.  I will write a prescription for a rolling walker for her to be used on an outpatient basis.  I talked with her at length about outpatient physical therapy versus home health services.  Patient reports that she does not feel like that she will need any physical therapy at this time.  She feels like that she is getting stronger on a daily basis.  I informed her that if she gets home, changes her mind, and believes that she would benefit from physical therapy moving forward to please not hesitate contact us.  She reports that she will be compliant with this plan.    Condition on Discharge:  Medically stable  Physical Exam on Discharge:  /64 (BP Location: Right arm, Patient Position: Lying)   Pulse 79   Temp 97.2 °F (36.2 °C) (Oral)   Resp 16   Ht 149.9 cm (59.02\")   Wt 57.4 kg (126 lb 8 oz)   SpO2 100%   BMI 25.54 kg/m²    Physical Exam  No acute distress, sitting up in bed, nonlabored breathing, lungs clear, no supplemental oxygen requirement, no accessory muscle use, no work of breathing, patient is alert and oriented, pleasant to talk with, very eager to get home!    Discharge Disposition:  Home or Self Care    Discharge " Medications:     Discharge Medications      New Medications      Instructions Start Date   carvedilol 6.25 MG tablet  Commonly known as:  COREG   6.25 mg, Oral, 2 Times Daily With Meals         Changes to Medications      Instructions Start Date   losartan 25 MG tablet  Commonly known as:  COZAAR  What changed:  how much to take   25 mg, Oral, Daily         Continue These Medications      Instructions Start Date   aspirin 81 MG EC tablet   81 mg, Oral, Daily      brimonidine 0.2 % ophthalmic solution  Commonly known as:  ALPHAGAN   1 drop, Left Eye, 3 Times Daily      calcitriol 0.25 MCG capsule  Commonly known as:  ROCALTROL   0.25 mcg, Oral, Take As Directed, Only given on dialysis days (Tuesday, Thursday, Saturday)      cetirizine 10 MG tablet  Commonly known as:  zyrTEC   10 mg, Oral, Nightly PRN      cholecalciferol 25 MCG (1000 UT) tablet  Commonly known as:  VITAMIN D3   1,000 Units, Oral, Daily      dorzolamide 2 % ophthalmic solution  Commonly known as:  TRUSOPT   1 drop, Left Eye, 2 Times Daily      famotidine 20 MG tablet  Commonly known as:  PEPCID   20 mg, Oral, 2 Times Daily      fluticasone 50 MCG/ACT nasal spray  Commonly known as:  FLONASE   1 spray, Nasal, 2 Times Daily      homatropine 5 % ophthalmic solution   1 drop, Left Eye, Daily      insulin glargine 100 UNIT/ML injection  Commonly known as:  LANTUS   10 Units, Subcutaneous, Nightly      lidocaine-prilocaine 2.5-2.5 % cream  Commonly known as:  EMLA   1 application, Topical, Take As Directed, Apply to access area 30 minutes prior to dialysis.      midodrine 5 MG tablet  Commonly known as:  PROAMATINE   5 mg, Oral, Take As Directed, as needed for low blood pressure during dialysis.       pravastatin 10 MG tablet  Commonly known as:  PRAVACHOL   10 mg, Oral, Nightly      RHOPRESSA 0.02 % solution  Generic drug:  Netarsudil Dimesylate   1 drop, Left Eye, Nightly      sevelamer 800 MG tablet  Commonly known as:  RENAGEL   1,600 mg, Oral, 3 Times  Daily With Meals      timolol 0.5 % ophthalmic solution  Commonly known as:  TIMOPTIC   1 drop, Left Eye, Every Morning      TRAVATAN Z 0.004 % solution ophthalmic solution  Generic drug:  travoprost (JUDE free)   1 drop, Left Eye, Nightly         Stop These Medications    benzonatate 100 MG capsule  Commonly known as:  TESSALON     guaiFENesin 600 MG 12 hr tablet  Commonly known as:  MUCINEX     predniSONE 20 MG tablet  Commonly known as:  DELTASONE     sertraline 25 MG tablet  Commonly known as:  ZOLOFT            Discharge Diet: diabetic, renal diet    Activity at Discharge:  please ambulate with rolling walker    Follow-up Appointments:   Dr. Kelly is listed as patient's primary care provider, however he no longer lives in this area.  We will make arrangements for her to establish with a new primary care provider at [facility] -Dr. Saucedo, Dr. Sheldon, or Dr. Ferrera.    Test Results Pending at Discharge: none    Jaswant Villegas MD  12/11/19  12:03 PM    Time: 25 min        Electronically signed by Jaswant Villegas MD at 12/11/19 6487

## 2019-12-11 NOTE — PROGRESS NOTES
Continued Stay Note   Db     Patient Name: Mini Gentile  MRN: [id number]  Today's Date: 12/11/2019    Admit Date: 12/6/2019    Discharge Plan     Row Name 12/11/19 1019       Plan    Plan  Home    Patient/Family in Agreement with Plan  yes    Plan Comments  Pt has been set up at [facility] at 12:25.  Called [employer] and set up ride to begin tomorrow 12/12.  Spoke with Digna solano [phone] confirmation number [id number].         Discharge Codes    No documentation.             SACHI Douglas

## 2019-12-12 ENCOUNTER — READMISSION MANAGEMENT (OUTPATIENT)
Dept: CALL CENTER | Facility: HOSPITAL | Age: 59
End: 2019-12-12

## 2019-12-12 ENCOUNTER — TELEPHONE (OUTPATIENT)
Dept: INTERNAL MEDICINE | Age: 59
End: 2019-12-12

## 2019-12-12 NOTE — THERAPY DISCHARGE NOTE
Acute Care - Physical Therapy Discharge Summary  [facility]       Patient Name: Mini Gentile  : 1960  MRN: [id number]    Today's Date: 2019  Onset of Illness/Injury or Date of Surgery: 19       Referring Physician: Dr. Messina      Admit Date: 2019      PT Recommendation and Plan    Visit Dx:    ICD-10-CM ICD-9-CM   1. End stage renal failure on dialysis (CMS/[facility]) N18.6 585.6    Z99.2 V45.11   2. Hypervolemia, unspecified hypervolemia type E87.70 276.69   3. Dyspnea, unspecified type R06.00 786.09   4. Impaired mobility Z74.09 799.89   5. Hypertension, unspecified type I10 401.9   6. Hyperkalemia E87.5 276.7               Rehab Goal Summary     Row Name 19 1602             Bed Mobility Goal 1 (PT)    Activity/Assistive Device (Bed Mobility Goal 1, PT)  bed mobility activities, all  -NW      [place] Level/Cues Needed (Bed Mobility Goal 1, PT)  independent  -NW      Time Frame (Bed Mobility Goal 1, PT)  long term goal (LTG)  -NW      Progress/Outcomes (Bed Mobility Goal 1, PT)  goal not met  -NW         Transfer Goal 1 (PT)    Activity/Assistive Device (Transfer Goal 1, PT)  sit-to-stand/stand-to-sit;walker, rolling  -NW      [place] Level/Cues Needed (Transfer Goal 1, PT)  supervision required  -NW      Time Frame (Transfer Goal 1, PT)  long term goal (LTG)  -NW      Progress/Outcome (Transfer Goal 1, PT)  goal met  -NW         Gait Training Goal 1 (PT)    Activity/Assistive Device (Gait Training Goal 1, PT)  gait (walking locomotion);assistive device use;decrease fall risk;improve balance and speed;increase endurance/gait distance;walker, rolling  -NW      [place] Level (Gait Training Goal 1, PT)  contact guard assist  -NW      Distance (Gait Goal 1, PT)  200 ft  -NW      Time Frame (Gait Training Goal 1, PT)  long term goal (LTG)  -NW      Progress/Outcome (Gait Training Goal 1, PT)  goal met  -NW         Stairs Goal 1 (PT)    Activity/Assistive Device (Stairs  Goal 1, PT)  ascending stairs;descending stairs;decrease fall risk;improve balance and speed;other (see comments) w/ or w/o appropriate AD  -NW      [place] Level/Cues Needed (Stairs Goal 1, PT)  contact guard assist  -NW      Number of Stairs (Stairs Goal 1, PT)  4  -NW      Time Frame (Stairs Goal 1, PT)  long term goal (LTG)  -NW      Progress/Outcome (Stairs Goal 1, PT)  goal not met  -NW        User Key  (r) = Recorded By, (t) = Taken By, (c) = Cosigned By    Initials Name Provider Type Discipline    NW Katie June PTA Physical Therapy Assistant PT              PT Discharge Summary  Anticipated Discharge Disposition (PT): home  Reason for Discharge: Discharge from facility  Outcomes Achieved: Other  Discharge Destination: Home      Katie June PTA   12/12/2019

## 2019-12-12 NOTE — PAYOR COMM NOTE
"DC HOME 12-11-19  [id number]    Mini Peña (59 y.o. Female)     Date of Birth Social Security Number Address Home Phone MRN    [id number]  [id number] [place] [id number] [phone] [id number]    Holiness Marital Status          Other        Admission Date Admission Type Admitting Provider Attending Provider Department, Room/Bed    12/6/19 Emergency Jaswant Villegas MD  [facility] 4C, 462/1    Discharge Date Discharge Disposition Discharge Destination        12/11/2019 Home or Self Care Home             Attending Provider:  (none)   Allergies:  Bupropion, Chantix [Varenicline], Sulfa Antibiotics, Azithromycin, Levofloxacin, Penicillins    Isolation:  None   Infection:  None   Code Status:  Prior    Ht:  149.9 cm (59.02\")   Wt:  57.4 kg (126 lb 8 oz)    Admission Cmt:  None   Principal Problem:  None                Active Insurance as of 12/6/2019     Primary Coverage     Payor Plan Insurance Group Employer/Plan Group    MEDICARE MEDICARE A & B      Payor Plan Address Payor Plan Phone Number Payor Plan Fax Number Effective Dates    [address] [phone]  7/1/2017 - None Entered    [facility] [id number]       Subscriber Name Subscriber Birth Date Member ID       MINI PEÑA 1960 [id number]           Secondary Coverage     Payor Plan Insurance Group Employer/Plan Group    [employer] [place]ID      Payor Plan Address Payor Plan Phone Number Payor Plan Fax Number Effective Dates    [address] [address] [phone]  11/4/2016 - None Entered    [facility] [address]       Subscriber Name Subscriber Birth Date Member ID       MINI PEÑA 1960 [id number]                 Emergency Contacts      (Rel.) Home Phone Work Phone Mobile Phone    Erica Bowling (Daughter) -- -- [phone]               Discharge Summary      Jaswant Villegas MD at 12/11/19 1203              [facility] Medicine " Services  DISCHARGE SUMMARY       Date of Admission: 12/6/2019  Date of Discharge:  12/11/2019  Primary Care Physician: Ivan Kelly MD    Presenting Problem/History of Present Illness:  End stage renal failure on dialysis (CMS/[facility]) [N18.6, Z99.2]     Final Discharge Diagnoses:  Active Hospital Problems    Diagnosis   • Anemia due to chronic kidney disease, on chronic dialysis (CMS/[facility])   • Acute pulmonary edema (CMS/[facility])   • Non-compliance with renal dialysis (CMS/[facility])   • Elevated troponin due to ESRD    • Hyperkalemia   • Encephalopathy, metabolic, due to uremia   • High anion gap metabolic acidosis due to uremia   • Type 2 diabetes mellitus, with long-term current use of insulin (CMS/[facility])   • End stage renal failure on dialysis (CMS/[facility])   • Diabetic nephropathy (CMS/[facility])   • HTN (hypertension)       Consults: Nephrology    Procedures Performed:   Imaging Results (All)     Procedure Component Value Units Date/Time    XR Chest 1 View [[id number]] Collected:  12/06/19 1332     Updated:  12/06/19 1339    Narrative:       XR CHEST 1 VW- 12/6/2019 1:13 PM CST     HISTORY: dyspnea       COMPARISON: Chest exam dated 09/03/2019.     FINDINGS:   Fairly extensive bibasilar lung consolidation with obscuration of both  hemidiaphragms and what appears to be a trace layering left pleural  effusion. Additional mild patchy infiltrate in the paramedian right  upper lobe. Bilateral diffuse coarsened interstitial markings with what  appears to be subpleural Kerley B lines in the right base. Underlying  mild cardiomegaly. No pneumothorax. Incidentally noted vascular stent in  the region of the left brachiocephalic vein. No acute bony abnormality.        Impression:       1. Bilateral interstitial and basilar consolidative infiltrates. There  appear to be subpleural Kerley B lines in the right base, suggesting a  volume overload with interstitial and casey pulmonary edema. This seems  less likely infectious.        This  report was finalized on 12/06/2019 13:35 by Dr Jhonatan Berry, .          Pertinent Test Results:   Lab Results (last 72 hours)     Procedure Component Value Units Date/Time    POC Glucose Once [[id number]]  (Abnormal) Collected:  12/11/19 1150    Specimen:  Blood Updated:  12/11/19 1202     Glucose 180 mg/dL      Comment: : [id number] Farzad Dumont ID: [id number]       Basic Metabolic Panel [[id number]]  (Abnormal) Collected:  12/11/19 0936    Specimen:  Blood Updated:  12/11/19 1000     Glucose 194 mg/dL      BUN 40 mg/dL      Creatinine 6.47 mg/dL      Sodium 127 mmol/L      Potassium 4.8 mmol/L      Chloride 88 mmol/L      CO2 23.0 mmol/L      Calcium 9.0 mg/dL      eGFR   Amer --     Comment: <15 Indicative of kidney failure.        eGFR Non African Amer 7 mL/min/1.73      Comment: <15 Indicative of kidney failure.        BUN/Creatinine Ratio 6.2     Anion Gap 16.0 mmol/L     Narrative:       GFR Normal >60  Chronic Kidney Disease <60  Kidney Failure <15      POC Glucose Once [[id number]]  (Abnormal) Collected:  12/11/19 0720    Specimen:  Blood Updated:  12/11/19 0732     Glucose 192 mg/dL      Comment: : [id number] Farzad Dumont ID: [id number]       POC Glucose Once [[id number]]  (Abnormal) Collected:  12/10/19 2004    Specimen:  Blood Updated:  12/10/19 2034     Glucose 225 mg/dL      Comment: : [id number] [facility] ID: [id number]       POC Glucose Once [[id number]]  (Abnormal) Collected:  12/10/19 1645    Specimen:  Blood Updated:  12/10/19 1656     Glucose 175 mg/dL      Comment: : [id number] Farzad Dumont ID: [id number]       POC Glucose Once [[id number]]  (Abnormal) Collected:  12/10/19 1150    Specimen:  Blood Updated:  12/10/19 1201     Glucose 189 mg/dL      Comment: : [id number] Farzad Dumont ID: [id number]       POC Glucose Once [[id number]]  (Normal) Collected:  12/10/19 0732    Specimen:  Blood Updated:  12/10/19 0743     Glucose 130 mg/dL      Comment:  : [id number] Farzad Dumont ID: [id number]       Basic Metabolic Panel [[id number]]  (Abnormal) Collected:  12/10/19 0642    Specimen:  Blood Updated:  12/10/19 0711     Glucose 151 mg/dL      BUN 24 mg/dL      Creatinine 4.81 mg/dL      Sodium 135 mmol/L      Potassium 4.3 mmol/L      Chloride 97 mmol/L      CO2 25.0 mmol/L      Calcium 9.3 mg/dL      eGFR   Amer --     Comment: <15 Indicative of kidney failure.        eGFR Non African Amer 9 mL/min/1.73      Comment: <15 Indicative of kidney failure.        BUN/Creatinine Ratio 5.0     Anion Gap 13.0 mmol/L     Narrative:       GFR Normal >60  Chronic Kidney Disease <60  Kidney Failure <15      Hemoglobin A1c [[id number]]  (Normal) Collected:  12/10/19 0643    Specimen:  Blood Updated:  12/10/19 0704     Hemoglobin A1C 5.30 %     Narrative:       Hemoglobin A1C Ranges:    Increased Risk for Diabetes  5.7% to 6.4%  Diabetes                     >= 6.5%  Diabetic Goal                < 7.0%    CBC (No Diff) [[id number]]  (Abnormal) Collected:  12/10/19 0642    Specimen:  Blood Updated:  12/10/19 0654     WBC 8.38 10*3/mm3      RBC 2.71 10*6/mm3      Hemoglobin 8.5 g/dL      Hematocrit 25.4 %      MCV 93.7 fL      MCH 31.4 pg      MCHC 33.5 g/dL      RDW 13.9 %      RDW-SD 47.0 fl      MPV 9.1 fL      Platelets 296 10*3/mm3     POC Glucose Once [[id number]]  (Abnormal) Collected:  12/09/19 2038    Specimen:  Blood Updated:  12/09/19 2101     Glucose 236 mg/dL      Comment: : [id number] Hernandes VanessalyMeter ID: [id number]       POC Glucose Once [[id number]]  (Abnormal) Collected:  12/09/19 1618    Specimen:  Blood Updated:  12/09/19 1637     Glucose 231 mg/dL      Comment: : [id number] Phi WihteMeter ID: [id number]       POC Glucose Once [[id number]]  (Normal) Collected:  12/09/19 1226    Specimen:  Blood Updated:  12/09/19 1244     Glucose 107 mg/dL      Comment: : [id number] Lety AnnMeter ID: [id number]       POC Glucose Once [[id number]]   "(Normal) Collected:  12/09/19 0725    Specimen:  Blood Updated:  12/09/19 0843     Glucose 110 mg/dL      Comment: : [id number] Andrés SoaresMeter ID: [id number]       Renal Function Panel [[id number]]  (Abnormal) Collected:  12/09/19 0359    Specimen:  Blood Updated:  12/09/19 0506     Glucose 100 mg/dL      BUN 31 mg/dL      Creatinine 6.69 mg/dL      Sodium 139 mmol/L      Potassium 4.2 mmol/L      Chloride 96 mmol/L      CO2 24.0 mmol/L      Calcium 9.0 mg/dL      Albumin 3.80 g/dL      Phosphorus 6.3 mg/dL      Anion Gap 19.0 mmol/L      BUN/Creatinine Ratio 4.6     eGFR Non African Amer 6 mL/min/1.73      Comment: <15 Indicative of kidney failure.        eGFR   Amer --     Comment: <15 Indicative of kidney failure.       Narrative:       GFR Normal >60  Chronic Kidney Disease <60  Kidney Failure <15      POC Glucose Once [[id number]]  (Abnormal) Collected:  12/08/19 2024    Specimen:  Blood Updated:  12/08/19 2044     Glucose 239 mg/dL      Comment: : [id number] Be WisdomMeter ID: [id number]       POC Glucose Once [[id number]]  (Abnormal) Collected:  12/08/19 1625    Specimen:  Blood Updated:  12/08/19 1725     Glucose 271 mg/dL      Comment: : [id number] Andrés SoaresMeter ID: [id number]             Chief Complaint on Day of Discharge: \"I'm ready to go home!\"    Hospital Course:  The patient is a 59 y.o. female who presented to [facility] with a chief complaint of shortness of breath.  Patient has a known history of end-stage renal disease presumably due to diabetes mellitus and hypertensive kidney disease that was supposed to be on dialysis 3 times per week but evidently stopped going to dialysis back in September 2019.  Records indicate that upon arrival patient was having symptoms of shortness of breath, orthopnea, and PND.  Patient was in agreement to restart dialysis.  Nephrology was consulted.  On arrival her BUN was found to be 123 with a creatinine of 14.18.  " Chest x-ray upon arrival was consistent with pulmonary edema.  Patient was admitted to the hospitalist service with close consultation with nephrology who followed along with us during this hospitalization.    Patient has an AV fistula in her left upper extremity which was used for dialysis.  She has been tolerating dialysis without problem.  She reports that her shortness of breath symptoms have significantly improved.  She is now maintaining O2 saturations near 100% on room air.  Nephrology is cleared patient for discharge later today following dialysis.  It has taken us some time, however we have been able to arrange a dialysis chair at the [facility] with her next session scheduled for tomorrow at 12:25 PM.  She will be on dialysis on a Tuesday, Thursday, and Saturday regimen.  She is agreeable to this plan.  We discussed the importance of compliance with her dialysis therapy, and she is agreeable to doing this moving forward.    I inquired today if she needs any new prescriptions for home medications, and she reports that she has all of her needed medications at home.  She reports that she takes Lantus 10 units subcutaneously once daily.  She reports that she has a glucometer in addition to her diabetic testing supplies.    She has been working with physical therapy given generalized weakness that she was experiencing upon arrival.  It was noted that patient did have a postural sway when standing and some unsteadiness when turning in the hallway.  Physical therapy did recommend that she would benefit from ambulating with a rolling walker at home and in the community upon discharge.  I will write a prescription for a rolling walker for her to be used on an outpatient basis.  I talked with her at length about outpatient physical therapy versus home health services.  Patient reports that she does not feel like that she will need any physical therapy at this time.  She feels like that she is getting  "stronger on a daily basis.  I informed her that if she gets home, changes her mind, and believes that she would benefit from physical therapy moving forward to please not hesitate contact us.  She reports that she will be compliant with this plan.    Condition on Discharge:  Medically stable  Physical Exam on Discharge:  /64 (BP Location: Right arm, Patient Position: Lying)   Pulse 79   Temp 97.2 °F (36.2 °C) (Oral)   Resp 16   Ht 149.9 cm (59.02\")   Wt 57.4 kg (126 lb 8 oz)   SpO2 100%   BMI 25.54 kg/m²    Physical Exam  No acute distress, sitting up in bed, nonlabored breathing, lungs clear, no supplemental oxygen requirement, no accessory muscle use, no work of breathing, patient is alert and oriented, pleasant to talk with, very eager to get home!    Discharge Disposition:  Home or Self Care    Discharge Medications:     Discharge Medications      New Medications      Instructions Start Date   carvedilol 6.25 MG tablet  Commonly known as:  COREG   6.25 mg, Oral, 2 Times Daily With Meals         Changes to Medications      Instructions Start Date   losartan 25 MG tablet  Commonly known as:  COZAAR  What changed:  how much to take   25 mg, Oral, Daily         Continue These Medications      Instructions Start Date   aspirin 81 MG EC tablet   81 mg, Oral, Daily      brimonidine 0.2 % ophthalmic solution  Commonly known as:  ALPHAGAN   1 drop, Left Eye, 3 Times Daily      calcitriol 0.25 MCG capsule  Commonly known as:  ROCALTROL   0.25 mcg, Oral, Take As Directed, Only given on dialysis days (Tuesday, Thursday, Saturday)      cetirizine 10 MG tablet  Commonly known as:  zyrTEC   10 mg, Oral, Nightly PRN      cholecalciferol 25 MCG (1000 UT) tablet  Commonly known as:  VITAMIN D3   1,000 Units, Oral, Daily      dorzolamide 2 % ophthalmic solution  Commonly known as:  TRUSOPT   1 drop, Left Eye, 2 Times Daily      famotidine 20 MG tablet  Commonly known as:  PEPCID   20 mg, Oral, 2 Times Daily    "   fluticasone 50 MCG/ACT nasal spray  Commonly known as:  FLONASE   1 spray, Nasal, 2 Times Daily      homatropine 5 % ophthalmic solution   1 drop, Left Eye, Daily      insulin glargine 100 UNIT/ML injection  Commonly known as:  LANTUS   10 Units, Subcutaneous, Nightly      lidocaine-prilocaine 2.5-2.5 % cream  Commonly known as:  EMLA   1 application, Topical, Take As Directed, Apply to access area 30 minutes prior to dialysis.      midodrine 5 MG tablet  Commonly known as:  PROAMATINE   5 mg, Oral, Take As Directed, as needed for low blood pressure during dialysis.       pravastatin 10 MG tablet  Commonly known as:  PRAVACHOL   10 mg, Oral, Nightly      RHOPRESSA 0.02 % solution  Generic drug:  Netarsudil Dimesylate   1 drop, Left Eye, Nightly      sevelamer 800 MG tablet  Commonly known as:  RENAGEL   1,600 mg, Oral, 3 Times Daily With Meals      timolol 0.5 % ophthalmic solution  Commonly known as:  TIMOPTIC   1 drop, Left Eye, Every Morning      TRAVATAN Z 0.004 % solution ophthalmic solution  Generic drug:  travoprost (BAK free)   1 drop, Left Eye, Nightly         Stop These Medications    benzonatate 100 MG capsule  Commonly known as:  TESSALON     guaiFENesin 600 MG 12 hr tablet  Commonly known as:  MUCINEX     predniSONE 20 MG tablet  Commonly known as:  DELTASONE     sertraline 25 MG tablet  Commonly known as:  ZOLOFT            Discharge Diet: diabetic, renal diet    Activity at Discharge:  please ambulate with rolling walker    Follow-up Appointments:   Dr. Kelly is listed as patient's primary care provider, however he no longer lives in this area.  We will make arrangements for her to establish with a new primary care provider at [facility] -Dr. Saucedo, Dr. Sheldon, or Dr. Ferrera.    Test Results Pending at Discharge: none    Jaswant Villegas MD  12/11/19  12:03 PM    Time: 25 min        Electronically signed by Jaswant Villegas MD at 12/11/19 7517

## 2019-12-12 NOTE — OUTREACH NOTE
Prep Survey      Responses   Facility patient discharged from?  [facility]   Is patient eligible?  Yes   Discharge diagnosis  anemia due to CKD on chronic dialysis   Does the patient have one of the following disease processes/diagnoses(primary or secondary)?  Other   Does the patient have Home health ordered?  No   Is there a DME ordered?  Yes   What DME was ordered?  [facility]   Comments regarding appointments  see AVS   Medication alerts for this patient  coreg, cozaar   Prep survey completed?  Yes          Carolin Jensen RN

## 2019-12-13 ENCOUNTER — READMISSION MANAGEMENT (OUTPATIENT)
Dept: CALL CENTER | Facility: HOSPITAL | Age: 59
End: 2019-12-13

## 2019-12-14 NOTE — OUTREACH NOTE
Medical Week 1 Survey      Responses   Facility patient discharged from?  [place]   Does the patient have one of the following disease processes/diagnoses(primary or secondary)?  Other   Is there a successful TCM telephone encounter documented?  No   Week 1 attempt successful?  Yes   Call start time  1804   Call end time  1811   Is patient permission given to speak with other caregiver?  Yes   List who call center can speak with  Sundeep Barrera reviewed with patient/caregiver?  Yes   Is the patient having any side effects they believe may be caused by any medication additions or changes?  No   Does the patient have all medications ordered at discharge?  Yes   Is the patient taking all medications as directed (includes completed medication regime)?  Yes   Does the patient have a primary care provider?   Yes   Does the patient have an appointment with their PCP within 7 days of discharge?  No   What is preventing the patient from scheduling follow up appointments within 7 days of discharge?  Haven't had time   Nursing Interventions  Educated patient on importance of making appointment   Has the patient kept scheduled appointments due by today?  N/A   Comments  States will call PCP office on Monday.   Has home health visited the patient within 72 hours of discharge?  N/A   Has all DME been delivered?  Yes   DME comments  using rolling walker   Psychosocial issues?  No   Did the patient receive a copy of their discharge instructions?  Yes   Nursing interventions  Reviewed instructions with patient, Educated on MyChart   What is the patient's perception of their health status since discharge?  Improving   Is the patient/caregiver able to teach back signs and symptoms related to disease process for when to call PCP?  Yes   Is the patient/caregiver able to teach back signs and symptoms related to disease process for when to call 911?  Yes   Is the patient/caregiver able to teach back the hierarchy of who to call/visit for  symptoms/problems? PCP, Specialist, Home health nurse, Urgent Care, ED, 911  Yes   Week 1 call completed?  Yes   Wrap up additional comments  States is feeling better except for low backache-states is relieved with Tylenol. States will discuss with PCP when calls office on Monday. States getting dialysis Tues-Thurs-Sat.           Katerine Capps RN

## 2019-12-20 ENCOUNTER — READMISSION MANAGEMENT (OUTPATIENT)
Dept: CALL CENTER | Facility: HOSPITAL | Age: 59
End: 2019-12-20

## 2019-12-20 NOTE — OUTREACH NOTE
Medical Week 2 Survey      Responses   Facility patient discharged from?  [place]   Does the patient have one of the following disease processes/diagnoses(primary or secondary)?  Other   Week 2 attempt successful?  Yes   Call start time  1448   Call end time  1451   Medication alerts for this patient  coreg, cozaar   Meds reviewed with patient/caregiver?  Yes   Is the patient taking all medications as directed (includes completed medication regime)?  Yes   Comments regarding appointments  Pt to see pcp after holidays.   Has the patient kept scheduled appointments due by today?  Yes   Week 2 Call Completed?  Yes   Wrap up additional comments  Pt continues to go to dialysis 3 x per week. She has not had a follow up MD appointment yet but is doing well.          Kary Gillespie RN

## 2019-12-30 ENCOUNTER — READMISSION MANAGEMENT (OUTPATIENT)
Dept: CALL CENTER | Facility: HOSPITAL | Age: 59
End: 2019-12-30

## 2019-12-30 NOTE — OUTREACH NOTE
Medical Week 3 Survey      Responses   Facility patient discharged from?  [place]   Does the patient have one of the following disease processes/diagnoses(primary or secondary)?  Other   Week 3 attempt successful?  No   Unsuccessful attempts  Attempt 1          Kary Gillespie RN

## 2020-01-03 ENCOUNTER — READMISSION MANAGEMENT (OUTPATIENT)
Dept: CALL CENTER | Facility: HOSPITAL | Age: 60
End: 2020-01-03

## 2020-01-19 ENCOUNTER — HOSPITAL ENCOUNTER (INPATIENT)
Facility: HOSPITAL | Age: 60
LOS: 2 days | Discharge: HOME | End: 2020-01-21
Attending: EMERGENCY MEDICINE | Admitting: INTERNAL MEDICINE

## 2020-01-19 ENCOUNTER — APPOINTMENT (OUTPATIENT)
Dept: CT IMAGING | Facility: HOSPITAL | Age: 60
End: 2020-01-19

## 2020-01-19 ENCOUNTER — APPOINTMENT (OUTPATIENT)
Dept: GENERAL RADIOLOGY | Facility: HOSPITAL | Age: 60
End: 2020-01-19

## 2020-01-19 DIAGNOSIS — R06.03 RESPIRATORY DISTRESS: Primary | ICD-10-CM

## 2020-01-19 LAB
ALBUMIN SERPL-MCNC: 4.4 G/DL (ref 3.5–5.2)
ALBUMIN/GLOB SERPL: 1.6 G/DL
ALP SERPL-CCNC: 61 U/L (ref 39–117)
ALT SERPL W P-5'-P-CCNC: 9 U/L (ref 1–33)
ANION GAP SERPL CALCULATED.3IONS-SCNC: 16 MMOL/L (ref 5–15)
AST SERPL-CCNC: 17 U/L (ref 1–32)
ATMOSPHERIC PRESS: 763 MMHG
BASE EXCESS BLDV CALC-SCNC: 1.3 MMOL/L (ref 0–2)
BASOPHILS # BLD AUTO: 0.1 10*3/MM3 (ref 0–0.2)
BASOPHILS NFR BLD AUTO: 0.7 % (ref 0–1.5)
BDY SITE: ABNORMAL
BILIRUB SERPL-MCNC: 0.3 MG/DL (ref 0.2–1.2)
BODY TEMPERATURE: 37 C
BUN BLD-MCNC: 35 MG/DL (ref 8–23)
BUN/CREAT SERPL: 4.4 (ref 7–25)
CALCIUM SPEC-SCNC: 9.2 MG/DL (ref 8.6–10.5)
CHLORIDE SERPL-SCNC: 88 MMOL/L (ref 98–107)
CO2 SERPL-SCNC: 27 MMOL/L (ref 22–29)
CREAT BLD-MCNC: 7.96 MG/DL (ref 0.57–1)
D DIMER PPP FEU-MCNC: 2.31 MG/L (FEU) (ref 0–0.5)
D-LACTATE SERPL-SCNC: 1.2 MMOL/L (ref 0.5–2)
D-LACTATE SERPL-SCNC: 2.6 MMOL/L (ref 0.5–2)
DEPRECATED RDW RBC AUTO: 55.8 FL (ref 37–54)
EOSINOPHIL # BLD AUTO: 1.52 10*3/MM3 (ref 0–0.4)
EOSINOPHIL NFR BLD AUTO: 10 % (ref 0.3–6.2)
EPAP: 6
ERYTHROCYTE [DISTWIDTH] IN BLOOD BY AUTOMATED COUNT: 15.9 % (ref 12.3–15.4)
GFR SERPL CREATININE-BSD FRML MDRD: 5 ML/MIN/1.73
GFR SERPL CREATININE-BSD FRML MDRD: ABNORMAL ML/MIN/{1.73_M2}
GLOBULIN UR ELPH-MCNC: 2.7 GM/DL
GLUCOSE BLD-MCNC: 227 MG/DL (ref 65–99)
GLUCOSE BLDC GLUCOMTR-MCNC: 168 MG/DL (ref 70–130)
HCO3 BLDV-SCNC: 28.1 MMOL/L (ref 22–28)
HCT VFR BLD AUTO: 36.8 % (ref 34–46.6)
HGB BLD-MCNC: 11.9 G/DL (ref 12–15.9)
HOLD SPECIMEN: NORMAL
HOROWITZ INDEX BLD+IHG-RTO: 40 %
IMM GRANULOCYTES # BLD AUTO: 0.07 10*3/MM3 (ref 0–0.05)
IMM GRANULOCYTES NFR BLD AUTO: 0.5 % (ref 0–0.5)
IPAP: 16
LYMPHOCYTES # BLD AUTO: 5.37 10*3/MM3 (ref 0.7–3.1)
LYMPHOCYTES NFR BLD AUTO: 35.4 % (ref 19.6–45.3)
Lab: ABNORMAL
MAGNESIUM SERPL-MCNC: 2.4 MG/DL (ref 1.6–2.4)
MCH RBC QN AUTO: 31.5 PG (ref 26.6–33)
MCHC RBC AUTO-ENTMCNC: 32.3 G/DL (ref 31.5–35.7)
MCV RBC AUTO: 97.4 FL (ref 79–97)
MODALITY: ABNORMAL
MONOCYTES # BLD AUTO: 0.48 10*3/MM3 (ref 0.1–0.9)
MONOCYTES NFR BLD AUTO: 3.2 % (ref 5–12)
NEUTROPHILS # BLD AUTO: 7.61 10*3/MM3 (ref 1.7–7)
NEUTROPHILS NFR BLD AUTO: 50.2 % (ref 42.7–76)
NRBC BLD AUTO-RTO: 0.1 /100 WBC (ref 0–0.2)
NT-PROBNP SERPL-MCNC: ABNORMAL PG/ML (ref 5–900)
PCO2 BLDV: 54.4 MM HG (ref 41–51)
PH BLDV: 7.32 PH UNITS (ref 7.32–7.42)
PLATELET # BLD AUTO: 310 10*3/MM3 (ref 140–450)
PMV BLD AUTO: 10.2 FL (ref 6–12)
PO2 BLDV: 21.9 MM HG (ref 27–53)
POTASSIUM BLD-SCNC: 5.5 MMOL/L (ref 3.5–5.2)
PROT SERPL-MCNC: 7.1 G/DL (ref 6–8.5)
RBC # BLD AUTO: 3.78 10*6/MM3 (ref 3.77–5.28)
SAO2 % BLDCOV: 33.9 % (ref 45–75)
SET MECH RESP RATE: 12
SODIUM BLD-SCNC: 131 MMOL/L (ref 136–145)
TROPONIN T SERPL-MCNC: 0.03 NG/ML (ref 0–0.03)
TROPONIN T SERPL-MCNC: 0.07 NG/ML (ref 0–0.03)
VENTILATOR MODE: ABNORMAL
WBC NRBC COR # BLD: 15.15 10*3/MM3 (ref 3.4–10.8)
WHOLE BLOOD HOLD SPECIMEN: NORMAL
WHOLE BLOOD HOLD SPECIMEN: NORMAL

## 2020-01-19 PROCEDURE — 71045 X-RAY EXAM CHEST 1 VIEW: CPT

## 2020-01-19 PROCEDURE — 93010 ELECTROCARDIOGRAM REPORT: CPT | Performed by: INTERNAL MEDICINE

## 2020-01-19 PROCEDURE — 83605 ASSAY OF LACTIC ACID: CPT | Performed by: EMERGENCY MEDICINE

## 2020-01-19 PROCEDURE — 93005 ELECTROCARDIOGRAM TRACING: CPT | Performed by: EMERGENCY MEDICINE

## 2020-01-19 PROCEDURE — 25010000002 METHYLPREDNISOLONE PER 125 MG: Performed by: EMERGENCY MEDICINE

## 2020-01-19 PROCEDURE — 63710000001 INSULIN LISPRO (HUMAN) PER 5 UNITS: Performed by: FAMILY MEDICINE

## 2020-01-19 PROCEDURE — 82962 GLUCOSE BLOOD TEST: CPT

## 2020-01-19 PROCEDURE — 94799 UNLISTED PULMONARY SVC/PX: CPT

## 2020-01-19 PROCEDURE — 87040 BLOOD CULTURE FOR BACTERIA: CPT | Performed by: EMERGENCY MEDICINE

## 2020-01-19 PROCEDURE — 84484 ASSAY OF TROPONIN QUANT: CPT | Performed by: EMERGENCY MEDICINE

## 2020-01-19 PROCEDURE — 82803 BLOOD GASES ANY COMBINATION: CPT

## 2020-01-19 PROCEDURE — 36415 COLL VENOUS BLD VENIPUNCTURE: CPT

## 2020-01-19 PROCEDURE — 36415 COLL VENOUS BLD VENIPUNCTURE: CPT | Performed by: EMERGENCY MEDICINE

## 2020-01-19 PROCEDURE — 80053 COMPREHEN METABOLIC PANEL: CPT | Performed by: EMERGENCY MEDICINE

## 2020-01-19 PROCEDURE — 84484 ASSAY OF TROPONIN QUANT: CPT | Performed by: FAMILY MEDICINE

## 2020-01-19 PROCEDURE — 71275 CT ANGIOGRAPHY CHEST: CPT

## 2020-01-19 PROCEDURE — 83036 HEMOGLOBIN GLYCOSYLATED A1C: CPT | Performed by: FAMILY MEDICINE

## 2020-01-19 PROCEDURE — 99285 EMERGENCY DEPT VISIT HI MDM: CPT

## 2020-01-19 PROCEDURE — 94660 CPAP INITIATION&MGMT: CPT

## 2020-01-19 PROCEDURE — 0 IOPAMIDOL PER 1 ML: Performed by: EMERGENCY MEDICINE

## 2020-01-19 PROCEDURE — 83880 ASSAY OF NATRIURETIC PEPTIDE: CPT | Performed by: EMERGENCY MEDICINE

## 2020-01-19 PROCEDURE — 85379 FIBRIN DEGRADATION QUANT: CPT | Performed by: EMERGENCY MEDICINE

## 2020-01-19 PROCEDURE — 85025 COMPLETE CBC W/AUTO DIFF WBC: CPT | Performed by: EMERGENCY MEDICINE

## 2020-01-19 PROCEDURE — 83735 ASSAY OF MAGNESIUM: CPT | Performed by: EMERGENCY MEDICINE

## 2020-01-19 RX ORDER — BUMETANIDE 0.25 MG/ML
0.5 INJECTION INTRAMUSCULAR; INTRAVENOUS ONCE
Status: COMPLETED | OUTPATIENT
Start: 2020-01-19 | End: 2020-01-19

## 2020-01-19 RX ORDER — SODIUM CHLORIDE 0.9 % (FLUSH) 0.9 %
10 SYRINGE (ML) INJECTION EVERY 12 HOURS SCHEDULED
Status: DISCONTINUED | OUTPATIENT
Start: 2020-01-19 | End: 2020-01-21 | Stop reason: HOSPADM

## 2020-01-19 RX ORDER — NICOTINE 21 MG/24HR
1 PATCH, TRANSDERMAL 24 HOURS TRANSDERMAL NIGHTLY
Status: DISCONTINUED | OUTPATIENT
Start: 2020-01-19 | End: 2020-01-21 | Stop reason: HOSPADM

## 2020-01-19 RX ORDER — NITROGLYCERIN 0.4 MG/1
0.4 TABLET SUBLINGUAL
Status: DISCONTINUED | OUTPATIENT
Start: 2020-01-19 | End: 2020-01-21 | Stop reason: HOSPADM

## 2020-01-19 RX ORDER — METHYLPREDNISOLONE SODIUM SUCCINATE 125 MG/2ML
125 INJECTION, POWDER, LYOPHILIZED, FOR SOLUTION INTRAMUSCULAR; INTRAVENOUS ONCE
Status: COMPLETED | OUTPATIENT
Start: 2020-01-19 | End: 2020-01-19

## 2020-01-19 RX ORDER — METHYLPREDNISOLONE SODIUM SUCCINATE 40 MG/ML
40 INJECTION, POWDER, LYOPHILIZED, FOR SOLUTION INTRAMUSCULAR; INTRAVENOUS EVERY 12 HOURS
Status: DISCONTINUED | OUTPATIENT
Start: 2020-01-20 | End: 2020-01-21

## 2020-01-19 RX ORDER — NALOXONE HCL 0.4 MG/ML
0.4 VIAL (ML) INJECTION
Status: DISCONTINUED | OUTPATIENT
Start: 2020-01-19 | End: 2020-01-21 | Stop reason: HOSPADM

## 2020-01-19 RX ORDER — MORPHINE SULFATE 2 MG/ML
1 INJECTION, SOLUTION INTRAMUSCULAR; INTRAVENOUS EVERY 4 HOURS PRN
Status: DISCONTINUED | OUTPATIENT
Start: 2020-01-19 | End: 2020-01-21 | Stop reason: RX

## 2020-01-19 RX ORDER — CARVEDILOL 6.25 MG/1
6.25 TABLET ORAL 2 TIMES DAILY WITH MEALS
Status: DISCONTINUED | OUTPATIENT
Start: 2020-01-19 | End: 2020-01-21

## 2020-01-19 RX ORDER — ALBUTEROL SULFATE 2.5 MG/3ML
2.5 SOLUTION RESPIRATORY (INHALATION) EVERY 4 HOURS PRN
Status: DISCONTINUED | OUTPATIENT
Start: 2020-01-19 | End: 2020-01-21 | Stop reason: HOSPADM

## 2020-01-19 RX ORDER — ONDANSETRON 2 MG/ML
4 INJECTION INTRAMUSCULAR; INTRAVENOUS EVERY 6 HOURS PRN
Status: DISCONTINUED | OUTPATIENT
Start: 2020-01-19 | End: 2020-01-21 | Stop reason: HOSPADM

## 2020-01-19 RX ORDER — AMOXICILLIN 250 MG
2 CAPSULE ORAL NIGHTLY
Status: DISCONTINUED | OUTPATIENT
Start: 2020-01-19 | End: 2020-01-21 | Stop reason: HOSPADM

## 2020-01-19 RX ORDER — SODIUM CHLORIDE 0.9 % (FLUSH) 0.9 %
10 SYRINGE (ML) INJECTION AS NEEDED
Status: DISCONTINUED | OUTPATIENT
Start: 2020-01-19 | End: 2020-01-21 | Stop reason: HOSPADM

## 2020-01-19 RX ORDER — ACETAMINOPHEN 325 MG/1
650 TABLET ORAL EVERY 4 HOURS PRN
Status: DISCONTINUED | OUTPATIENT
Start: 2020-01-19 | End: 2020-01-21 | Stop reason: HOSPADM

## 2020-01-19 RX ORDER — ALPRAZOLAM 0.5 MG/1
0.5 TABLET ORAL EVERY 8 HOURS PRN
Status: DISCONTINUED | OUTPATIENT
Start: 2020-01-19 | End: 2020-01-21 | Stop reason: HOSPADM

## 2020-01-19 RX ORDER — DEXTROSE MONOHYDRATE 25 G/50ML
25 INJECTION, SOLUTION INTRAVENOUS
Status: DISCONTINUED | OUTPATIENT
Start: 2020-01-19 | End: 2020-01-21 | Stop reason: HOSPADM

## 2020-01-19 RX ORDER — IPRATROPIUM BROMIDE AND ALBUTEROL SULFATE 2.5; .5 MG/3ML; MG/3ML
3 SOLUTION RESPIRATORY (INHALATION)
Status: DISCONTINUED | OUTPATIENT
Start: 2020-01-20 | End: 2020-01-21 | Stop reason: HOSPADM

## 2020-01-19 RX ORDER — NICOTINE POLACRILEX 4 MG
15 LOZENGE BUCCAL
Status: DISCONTINUED | OUTPATIENT
Start: 2020-01-19 | End: 2020-01-21 | Stop reason: HOSPADM

## 2020-01-19 RX ADMIN — BUMETANIDE 0.5 MG: 0.25 INJECTION INTRAMUSCULAR; INTRAVENOUS at 23:19

## 2020-01-19 RX ADMIN — INSULIN LISPRO 2 UNITS: 100 INJECTION, SOLUTION INTRAVENOUS; SUBCUTANEOUS at 23:27

## 2020-01-19 RX ADMIN — METHYLPREDNISOLONE SODIUM SUCCINATE 125 MG: 125 INJECTION, POWDER, FOR SOLUTION INTRAMUSCULAR; INTRAVENOUS at 21:23

## 2020-01-19 RX ADMIN — CARVEDILOL 6.25 MG: 6.25 TABLET, FILM COATED ORAL at 23:16

## 2020-01-19 RX ADMIN — NICOTINE 1 PATCH: 21 PATCH, EXTENDED RELEASE TRANSDERMAL at 23:16

## 2020-01-19 RX ADMIN — SODIUM CHLORIDE, PRESERVATIVE FREE 10 ML: 5 INJECTION INTRAVENOUS at 23:22

## 2020-01-19 RX ADMIN — IOPAMIDOL 100 ML: 755 INJECTION, SOLUTION INTRAVENOUS at 22:18

## 2020-01-20 ENCOUNTER — APPOINTMENT (OUTPATIENT)
Dept: CARDIOLOGY | Facility: HOSPITAL | Age: 60
End: 2020-01-20

## 2020-01-20 LAB
ANION GAP SERPL CALCULATED.3IONS-SCNC: 15 MMOL/L (ref 5–15)
BASOPHILS # BLD AUTO: 0.02 10*3/MM3 (ref 0–0.2)
BASOPHILS NFR BLD AUTO: 0.2 % (ref 0–1.5)
BH CV ECHO MEAS - AO MAX PG (FULL): 3.5 MMHG
BH CV ECHO MEAS - AO MAX PG: 9.5 MMHG
BH CV ECHO MEAS - AO MEAN PG (FULL): 2 MMHG
BH CV ECHO MEAS - AO MEAN PG: 6 MMHG
BH CV ECHO MEAS - AO ROOT AREA (BSA CORRECTED): 1.4
BH CV ECHO MEAS - AO ROOT AREA: 3.5 CM^2
BH CV ECHO MEAS - AO ROOT DIAM: 2.1 CM
BH CV ECHO MEAS - AO V2 MAX: 154 CM/SEC
BH CV ECHO MEAS - AO V2 MEAN: 115 CM/SEC
BH CV ECHO MEAS - AO V2 VTI: 34.1 CM
BH CV ECHO MEAS - AVA(I,A): 2.6 CM^2
BH CV ECHO MEAS - AVA(I,D): 2.6 CM^2
BH CV ECHO MEAS - AVA(V,A): 2.5 CM^2
BH CV ECHO MEAS - AVA(V,D): 2.5 CM^2
BH CV ECHO MEAS - BSA(HAYCOCK): 1.6 M^2
BH CV ECHO MEAS - BSA: 1.5 M^2
BH CV ECHO MEAS - BZI_BMI: 26.7 KILOGRAMS/M^2
BH CV ECHO MEAS - BZI_METRIC_HEIGHT: 149.9 CM
BH CV ECHO MEAS - BZI_METRIC_WEIGHT: 59.9 KG
BH CV ECHO MEAS - EDV(CUBED): 94.2 ML
BH CV ECHO MEAS - EDV(MOD-SP4): 114 ML
BH CV ECHO MEAS - EDV(TEICH): 94.9 ML
BH CV ECHO MEAS - EF(CUBED): 60.1 %
BH CV ECHO MEAS - EF(MOD-SP4): 52 %
BH CV ECHO MEAS - EF(TEICH): 51.8 %
BH CV ECHO MEAS - ESV(CUBED): 37.6 ML
BH CV ECHO MEAS - ESV(MOD-SP4): 54.7 ML
BH CV ECHO MEAS - ESV(TEICH): 45.8 ML
BH CV ECHO MEAS - FS: 26.4 %
BH CV ECHO MEAS - IVS/LVPW: 1.1
BH CV ECHO MEAS - IVSD: 0.91 CM
BH CV ECHO MEAS - LA DIMENSION: 3.2 CM
BH CV ECHO MEAS - LA/AO: 1.5
BH CV ECHO MEAS - LAT PEAK E' VEL: 7.5 CM/SEC
BH CV ECHO MEAS - LV DIASTOLIC VOL/BSA (35-75): 73.8 ML/M^2
BH CV ECHO MEAS - LV MASS(C)D: 133.1 GRAMS
BH CV ECHO MEAS - LV MASS(C)DI: 86.1 GRAMS/M^2
BH CV ECHO MEAS - LV MAX PG: 6 MMHG
BH CV ECHO MEAS - LV MEAN PG: 4 MMHG
BH CV ECHO MEAS - LV SYSTOLIC VOL/BSA (12-30): 35.4 ML/M^2
BH CV ECHO MEAS - LV V1 MAX: 122 CM/SEC
BH CV ECHO MEAS - LV V1 MEAN: 89.5 CM/SEC
BH CV ECHO MEAS - LV V1 VTI: 28.3 CM
BH CV ECHO MEAS - LVIDD: 4.6 CM
BH CV ECHO MEAS - LVIDS: 3.4 CM
BH CV ECHO MEAS - LVLD AP4: 8.4 CM
BH CV ECHO MEAS - LVLS AP4: 7.2 CM
BH CV ECHO MEAS - LVOT AREA (M): 3.1 CM^2
BH CV ECHO MEAS - LVOT AREA: 3.1 CM^2
BH CV ECHO MEAS - LVOT DIAM: 2 CM
BH CV ECHO MEAS - LVPWD: 0.87 CM
BH CV ECHO MEAS - MED PEAK E' VEL: 4.68 CM/SEC
BH CV ECHO MEAS - MR MAX PG: 139.2 MMHG
BH CV ECHO MEAS - MR MAX VEL: 590 CM/SEC
BH CV ECHO MEAS - MR MEAN PG: 89 MMHG
BH CV ECHO MEAS - MR MEAN VEL: 442 CM/SEC
BH CV ECHO MEAS - MR VTI: 189 CM
BH CV ECHO MEAS - MV A MAX VEL: 128 CM/SEC
BH CV ECHO MEAS - MV DEC TIME: 0.19 SEC
BH CV ECHO MEAS - MV E MAX VEL: 124 CM/SEC
BH CV ECHO MEAS - MV E/A: 0.97
BH CV ECHO MEAS - PA MAX PG: 1.6 MMHG
BH CV ECHO MEAS - PA V2 MAX: 63.5 CM/SEC
BH CV ECHO MEAS - PI END-D VEL: 117 CM/SEC
BH CV ECHO MEAS - SI(AO): 76.4 ML/M^2
BH CV ECHO MEAS - SI(CUBED): 36.6 ML/M^2
BH CV ECHO MEAS - SI(LVOT): 57.5 ML/M^2
BH CV ECHO MEAS - SI(MOD-SP4): 38.4 ML/M^2
BH CV ECHO MEAS - SI(TEICH): 31.8 ML/M^2
BH CV ECHO MEAS - SV(AO): 118.1 ML
BH CV ECHO MEAS - SV(CUBED): 56.6 ML
BH CV ECHO MEAS - SV(LVOT): 88.9 ML
BH CV ECHO MEAS - SV(MOD-SP4): 59.3 ML
BH CV ECHO MEAS - SV(TEICH): 49.1 ML
BH CV ECHO MEASUREMENTS AVERAGE E/E' RATIO: 20.36
BUN BLD-MCNC: 38 MG/DL (ref 8–23)
BUN/CREAT SERPL: 4.5 (ref 7–25)
CALCIUM SPEC-SCNC: 9.4 MG/DL (ref 8.6–10.5)
CHLORIDE SERPL-SCNC: 91 MMOL/L (ref 98–107)
CO2 SERPL-SCNC: 26 MMOL/L (ref 22–29)
CREAT BLD-MCNC: 8.5 MG/DL (ref 0.57–1)
DEPRECATED RDW RBC AUTO: 52.3 FL (ref 37–54)
EOSINOPHIL # BLD AUTO: 0.02 10*3/MM3 (ref 0–0.4)
EOSINOPHIL NFR BLD AUTO: 0.2 % (ref 0.3–6.2)
ERYTHROCYTE [DISTWIDTH] IN BLOOD BY AUTOMATED COUNT: 15.2 % (ref 12.3–15.4)
GFR SERPL CREATININE-BSD FRML MDRD: 5 ML/MIN/1.73
GFR SERPL CREATININE-BSD FRML MDRD: ABNORMAL ML/MIN/{1.73_M2}
GLUCOSE BLD-MCNC: 144 MG/DL (ref 65–99)
GLUCOSE BLDC GLUCOMTR-MCNC: 147 MG/DL (ref 70–130)
GLUCOSE BLDC GLUCOMTR-MCNC: 208 MG/DL (ref 70–130)
GLUCOSE BLDC GLUCOMTR-MCNC: 281 MG/DL (ref 70–130)
GLUCOSE BLDC GLUCOMTR-MCNC: 388 MG/DL (ref 70–130)
HBA1C MFR BLD: 5 % (ref 4.8–5.6)
HCT VFR BLD AUTO: 29.7 % (ref 34–46.6)
HGB BLD-MCNC: 9.9 G/DL (ref 12–15.9)
IMM GRANULOCYTES # BLD AUTO: 0.03 10*3/MM3 (ref 0–0.05)
IMM GRANULOCYTES NFR BLD AUTO: 0.4 % (ref 0–0.5)
LEFT ATRIUM VOLUME INDEX: 26.8 ML/M2
LEFT ATRIUM VOLUME: 41.5 CM3
LV EF 2D ECHO EST: 55 %
LYMPHOCYTES # BLD AUTO: 0.65 10*3/MM3 (ref 0.7–3.1)
LYMPHOCYTES NFR BLD AUTO: 8.1 % (ref 19.6–45.3)
MAGNESIUM SERPL-MCNC: 2.6 MG/DL (ref 1.6–2.4)
MAXIMAL PREDICTED HEART RATE: 159 BPM
MCH RBC QN AUTO: 31.9 PG (ref 26.6–33)
MCHC RBC AUTO-ENTMCNC: 33.3 G/DL (ref 31.5–35.7)
MCV RBC AUTO: 95.8 FL (ref 79–97)
MONOCYTES # BLD AUTO: 0.09 10*3/MM3 (ref 0.1–0.9)
MONOCYTES NFR BLD AUTO: 1.1 % (ref 5–12)
NEUTROPHILS # BLD AUTO: 7.25 10*3/MM3 (ref 1.7–7)
NEUTROPHILS NFR BLD AUTO: 90 % (ref 42.7–76)
NRBC BLD AUTO-RTO: 0 /100 WBC (ref 0–0.2)
PHOSPHATE SERPL-MCNC: 6.5 MG/DL (ref 2.5–4.5)
PLATELET # BLD AUTO: 207 10*3/MM3 (ref 140–450)
PMV BLD AUTO: 9.4 FL (ref 6–12)
POTASSIUM BLD-SCNC: 5.5 MMOL/L (ref 3.5–5.2)
RBC # BLD AUTO: 3.1 10*6/MM3 (ref 3.77–5.28)
SODIUM BLD-SCNC: 132 MMOL/L (ref 136–145)
STRESS TARGET HR: 135 BPM
TROPONIN T SERPL-MCNC: 0.06 NG/ML (ref 0–0.03)
WBC NRBC COR # BLD: 8.06 10*3/MM3 (ref 3.4–10.8)

## 2020-01-20 PROCEDURE — 94799 UNLISTED PULMONARY SVC/PX: CPT

## 2020-01-20 PROCEDURE — 94640 AIRWAY INHALATION TREATMENT: CPT

## 2020-01-20 PROCEDURE — 80048 BASIC METABOLIC PNL TOTAL CA: CPT | Performed by: FAMILY MEDICINE

## 2020-01-20 PROCEDURE — 84484 ASSAY OF TROPONIN QUANT: CPT | Performed by: FAMILY MEDICINE

## 2020-01-20 PROCEDURE — 25010000002 METHYLPREDNISOLONE PER 40 MG: Performed by: FAMILY MEDICINE

## 2020-01-20 PROCEDURE — 83735 ASSAY OF MAGNESIUM: CPT | Performed by: FAMILY MEDICINE

## 2020-01-20 PROCEDURE — 63710000001 INSULIN LISPRO (HUMAN) PER 5 UNITS: Performed by: FAMILY MEDICINE

## 2020-01-20 PROCEDURE — 25010000002 ENOXAPARIN PER 10 MG: Performed by: FAMILY MEDICINE

## 2020-01-20 PROCEDURE — 93306 TTE W/DOPPLER COMPLETE: CPT

## 2020-01-20 PROCEDURE — 5A1D70Z PERFORMANCE OF URINARY FILTRATION, INTERMITTENT, LESS THAN 6 HOURS PER DAY: ICD-10-PCS | Performed by: INTERNAL MEDICINE

## 2020-01-20 PROCEDURE — 25010000002 PERFLUTREN 6.52 MG/ML SUSPENSION: Performed by: INTERNAL MEDICINE

## 2020-01-20 PROCEDURE — 84100 ASSAY OF PHOSPHORUS: CPT | Performed by: FAMILY MEDICINE

## 2020-01-20 PROCEDURE — 94760 N-INVAS EAR/PLS OXIMETRY 1: CPT

## 2020-01-20 PROCEDURE — 93306 TTE W/DOPPLER COMPLETE: CPT | Performed by: INTERNAL MEDICINE

## 2020-01-20 PROCEDURE — 85025 COMPLETE CBC W/AUTO DIFF WBC: CPT | Performed by: FAMILY MEDICINE

## 2020-01-20 PROCEDURE — 63710000001 INSULIN DETEMIR PER 5 UNITS: Performed by: INTERNAL MEDICINE

## 2020-01-20 PROCEDURE — 82962 GLUCOSE BLOOD TEST: CPT

## 2020-01-20 RX ORDER — FLUTICASONE PROPIONATE 50 MCG
1 SPRAY, SUSPENSION (ML) NASAL 2 TIMES DAILY
Status: DISCONTINUED | OUTPATIENT
Start: 2020-01-20 | End: 2020-01-21 | Stop reason: HOSPADM

## 2020-01-20 RX ORDER — FAMOTIDINE 20 MG/1
20 TABLET, FILM COATED ORAL
Status: DISCONTINUED | OUTPATIENT
Start: 2020-01-20 | End: 2020-01-21

## 2020-01-20 RX ORDER — LATANOPROST 50 UG/ML
1 SOLUTION/ DROPS OPHTHALMIC NIGHTLY
Status: DISCONTINUED | OUTPATIENT
Start: 2020-01-20 | End: 2020-01-21 | Stop reason: HOSPADM

## 2020-01-20 RX ORDER — CALCITRIOL 0.25 UG/1
0.25 CAPSULE, LIQUID FILLED ORAL
Status: DISCONTINUED | OUTPATIENT
Start: 2020-01-21 | End: 2020-01-21 | Stop reason: HOSPADM

## 2020-01-20 RX ORDER — CETIRIZINE HYDROCHLORIDE 10 MG/1
10 TABLET ORAL NIGHTLY PRN
Status: DISCONTINUED | OUTPATIENT
Start: 2020-01-20 | End: 2020-01-21 | Stop reason: HOSPADM

## 2020-01-20 RX ORDER — MELATONIN
2000 DAILY
Status: DISCONTINUED | OUTPATIENT
Start: 2020-01-20 | End: 2020-01-21 | Stop reason: HOSPADM

## 2020-01-20 RX ORDER — ASPIRIN 81 MG/1
81 TABLET ORAL DAILY
Status: DISCONTINUED | OUTPATIENT
Start: 2020-01-20 | End: 2020-01-21 | Stop reason: HOSPADM

## 2020-01-20 RX ORDER — PRAVASTATIN SODIUM 20 MG
10 TABLET ORAL NIGHTLY
Status: DISCONTINUED | OUTPATIENT
Start: 2020-01-20 | End: 2020-01-21 | Stop reason: HOSPADM

## 2020-01-20 RX ORDER — TIMOLOL MALEATE 5 MG/ML
1 SOLUTION/ DROPS OPHTHALMIC EVERY MORNING
Status: DISCONTINUED | OUTPATIENT
Start: 2020-01-20 | End: 2020-01-21 | Stop reason: HOSPADM

## 2020-01-20 RX ORDER — LOSARTAN POTASSIUM 50 MG/1
50 TABLET ORAL 2 TIMES DAILY
COMMUNITY
End: 2021-01-01 | Stop reason: HOSPADM

## 2020-01-20 RX ORDER — DORZOLAMIDE HCL 20 MG/ML
1 SOLUTION/ DROPS OPHTHALMIC 2 TIMES DAILY
Status: DISCONTINUED | OUTPATIENT
Start: 2020-01-20 | End: 2020-01-21 | Stop reason: HOSPADM

## 2020-01-20 RX ORDER — BRIMONIDINE TARTRATE 2 MG/ML
1 SOLUTION/ DROPS OPHTHALMIC 3 TIMES DAILY
Status: DISCONTINUED | OUTPATIENT
Start: 2020-01-20 | End: 2020-01-21 | Stop reason: HOSPADM

## 2020-01-20 RX ORDER — LOSARTAN POTASSIUM 25 MG/1
25 TABLET ORAL DAILY
Status: DISCONTINUED | OUTPATIENT
Start: 2020-01-20 | End: 2020-01-21

## 2020-01-20 RX ADMIN — FAMOTIDINE 20 MG: 20 TABLET, FILM COATED ORAL at 17:51

## 2020-01-20 RX ADMIN — DORZOLAMIDE HYDROCHLORIDE 1 DROP: 20 SOLUTION OPHTHALMIC at 10:13

## 2020-01-20 RX ADMIN — SENNOSIDES AND DOCUSATE SODIUM 2 TABLET: 8.6; 5 TABLET ORAL at 20:26

## 2020-01-20 RX ADMIN — IPRATROPIUM BROMIDE AND ALBUTEROL SULFATE 3 ML: 2.5; .5 SOLUTION RESPIRATORY (INHALATION) at 00:20

## 2020-01-20 RX ADMIN — IPRATROPIUM BROMIDE AND ALBUTEROL SULFATE 3 ML: 2.5; .5 SOLUTION RESPIRATORY (INHALATION) at 20:10

## 2020-01-20 RX ADMIN — INSULIN LISPRO 8 UNITS: 100 INJECTION, SOLUTION INTRAVENOUS; SUBCUTANEOUS at 20:41

## 2020-01-20 RX ADMIN — INSULIN LISPRO 6 UNITS: 100 INJECTION, SOLUTION INTRAVENOUS; SUBCUTANEOUS at 12:16

## 2020-01-20 RX ADMIN — PERFLUTREN 9.78 MG: 6.52 INJECTION, SUSPENSION INTRAVENOUS at 10:27

## 2020-01-20 RX ADMIN — INSULIN DETEMIR 10 UNITS: 100 INJECTION, SOLUTION SUBCUTANEOUS at 20:41

## 2020-01-20 RX ADMIN — CARVEDILOL 6.25 MG: 6.25 TABLET, FILM COATED ORAL at 08:18

## 2020-01-20 RX ADMIN — TIMOLOL MALEATE 1 DROP: 5 SOLUTION OPHTHALMIC at 10:13

## 2020-01-20 RX ADMIN — SODIUM CHLORIDE, PRESERVATIVE FREE 10 ML: 5 INJECTION INTRAVENOUS at 20:25

## 2020-01-20 RX ADMIN — FAMOTIDINE 20 MG: 20 TABLET, FILM COATED ORAL at 10:13

## 2020-01-20 RX ADMIN — NICOTINE 1 PATCH: 21 PATCH, EXTENDED RELEASE TRANSDERMAL at 20:27

## 2020-01-20 RX ADMIN — INSULIN LISPRO 4 UNITS: 100 INJECTION, SOLUTION INTRAVENOUS; SUBCUTANEOUS at 08:17

## 2020-01-20 RX ADMIN — FLUTICASONE PROPIONATE 1 SPRAY: 50 SPRAY, METERED NASAL at 20:25

## 2020-01-20 RX ADMIN — ASPIRIN 81 MG: 81 TABLET ORAL at 10:13

## 2020-01-20 RX ADMIN — IPRATROPIUM BROMIDE AND ALBUTEROL SULFATE 3 ML: 2.5; .5 SOLUTION RESPIRATORY (INHALATION) at 06:22

## 2020-01-20 RX ADMIN — SODIUM CHLORIDE, PRESERVATIVE FREE 10 ML: 5 INJECTION INTRAVENOUS at 08:23

## 2020-01-20 RX ADMIN — DORZOLAMIDE HYDROCHLORIDE 1 DROP: 20 SOLUTION OPHTHALMIC at 20:25

## 2020-01-20 RX ADMIN — FLUTICASONE PROPIONATE 1 SPRAY: 50 SPRAY, METERED NASAL at 10:13

## 2020-01-20 RX ADMIN — VITAMIN D 2000 UNITS: 25 TAB ORAL at 10:13

## 2020-01-20 RX ADMIN — LOSARTAN POTASSIUM 25 MG: 25 TABLET, FILM COATED ORAL at 10:13

## 2020-01-20 RX ADMIN — CARVEDILOL 6.25 MG: 6.25 TABLET, FILM COATED ORAL at 17:51

## 2020-01-20 RX ADMIN — PRAVASTATIN SODIUM 10 MG: 20 TABLET ORAL at 20:26

## 2020-01-20 RX ADMIN — ENOXAPARIN SODIUM 30 MG: 30 INJECTION SUBCUTANEOUS at 08:18

## 2020-01-20 RX ADMIN — METHYLPREDNISOLONE SODIUM SUCCINATE 40 MG: 40 INJECTION, POWDER, FOR SOLUTION INTRAMUSCULAR; INTRAVENOUS at 20:25

## 2020-01-20 RX ADMIN — BRIMONIDINE TARTRATE 1 DROP: 2 SOLUTION/ DROPS OPHTHALMIC at 10:13

## 2020-01-20 RX ADMIN — BRIMONIDINE TARTRATE 1 DROP: 2 SOLUTION/ DROPS OPHTHALMIC at 20:25

## 2020-01-20 RX ADMIN — METHYLPREDNISOLONE SODIUM SUCCINATE 40 MG: 40 INJECTION, POWDER, FOR SOLUTION INTRAMUSCULAR; INTRAVENOUS at 08:18

## 2020-01-20 RX ADMIN — LATANOPROST 1 DROP: 50 SOLUTION OPHTHALMIC at 20:25

## 2020-01-20 NOTE — PAYOR COMM NOTE
"ADMIT INPT 1-19-20  MEDICARE PRIMARY  UR  [phone]    Mini Peña (59 y.o. Female)     Date of Birth Social Security Number Address Home Phone MRN    1960  [id number] [place] [id number] [phone] [id number]    Spiritism Marital Status          Other        Admission Date Admission Type Admitting Provider Attending Provider Department, Room/Bed    1/19/20 Emergency Sundeep Aldridge MD [place], Glenn Riego, MD [facility] 4C, 496/1    Discharge Date Discharge Disposition Discharge Destination                       Attending Provider:  Sundeep Aldridge MD    Allergies:  Bupropion, Chantix [Varenicline], Sulfa Antibiotics, Azithromycin, Levofloxacin, Penicillins    Isolation:  None   Infection:  None   Code Status:  CPR    Ht:  147.3 cm (58\")   Wt:  60.3 kg (132 lb 15 oz)    Admission Cmt:  None   Principal Problem:  Respiratory distress [R06.03]                 Active Insurance as of 1/19/2020     Primary Coverage     Payor Plan Insurance Group Employer/Plan Group    MEDICARE MEDICARE A & B      Payor Plan Address Payor Plan Phone Number Payor Plan Fax Number Effective Dates    [address] [phone]  7/1/2017 - None Entered    [facility] [id number]       Subscriber Name Subscriber Birth Date Member ID       MINI PEÑA 1960 [id number]           Secondary Coverage     Payor Plan Insurance Group Employer/Plan Group    [employer] [place] [employer] [place]ICAID      Payor Plan Address Payor Plan Phone Number Payor Plan Fax Number Effective Dates    [address] [address] [phone]  11/4/2016 - None Entered    [facility] [id number]       Subscriber Name Subscriber Birth Date Member ID       MINI PEÑA 1960 [id number]                 Emergency Contacts      (Rel.) Home Phone Work Phone Mobile Phone    Erica Bowling (Daughter) -- -- [phone]               History & Physical      PolidoriHang MD at 01/19/20 2114     "          [facility] Medicine Services  HISTORY AND PHYSICAL    Date of Admission: 2020  Primary Care Physician: Ivan Kelly MD    Subjective     Chief Complaint: Shortness of breath    History of Present Illness  59 year old female with PMH of ESRD, DM 2 ID, Tobacco abuse that presents with sudden onset of shortness of breath.  states she decompensated suddenly and started gasping for air. EMS found her hypoxic. She is currently on BiPAP in the ER and feeling slightly better. Denies chest pain, fevers or sputum production. Does not complaint of leg pain. Has edema and missed HD. Has history of non compliance.     Review of Systems   Otherwise complete ROS reviewed and negative except as mentioned in the HPI.    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 She has a 3.00 pack-year smoking history. She has never used smokeless tobacco. She reports that she does not drink alcohol or use drugs.    Family History: family history includes Heart disease in her mother.       Allergies:  Allergies   Allergen Reactions   • Bupropion Nausea Only   • Chantix [Varenicline]    • Sulfa Antibiotics    • Azithromycin Rash   • Levofloxacin Rash   • Penicillins Rash     Medications:  Prior to Admission medications    Medication Sig Start Date  End Date Taking? Authorizing Provider   aspirin 81 MG EC tablet Take 81 mg by mouth Daily.    Steve Warren MD   brimonidine (ALPHAGAN) 0.2 % ophthalmic solution Administer 1 drop into the left eye 3 (Three) Times a Day.    Steve Warren MD   calcitriol (ROCALTROL) 0.25 MCG capsule Take 0.25 mcg by mouth Take As Directed. Only given on dialysis days (Tuesday, Thursday, Saturday)    Steve Warren MD   carvedilol (COREG) 6.25 MG tablet Take 1 tablet by mouth 2 (Two) Times a Day With Meals. 12/11/19   Jaswant Villegas MD   cetirizine (zyrTEC) 10 MG tablet Take 10 mg by mouth At Night As Needed (allergies).    Steve Warren MD   cholecalciferol (VITAMIN D3) 1000 units tablet Take 1,000 Units by mouth Daily.    Steve Warren MD   dorzolamide (TRUSOPT) 2 % ophthalmic solution Administer 1 drop into the left eye 2 (Two) Times a Day.    Steve Warren MD   famotidine (PEPCID) 20 MG tablet Take 20 mg by mouth 2 (Two) Times a Day.    Steve Warren MD   fluticasone (FLONASE) 50 MCG/ACT nasal spray 1 spray into the nostril(s) as directed by provider 2 (Two) Times a Day.    Steve Warren MD   homatropine 5 % ophthalmic solution Administer 1 drop into the left eye Daily.    Steve Warren MD   insulin glargine (LANTUS) 100 UNIT/ML injection Inject 10 Units under the skin Every Night.    Steve Warren MD   lidocaine-prilocaine (EMLA) 2.5-2.5 % cream Apply 1 application topically to the appropriate area as directed Take As Directed. Apply to access area 30 minutes prior to dialysis.     Steve Warren MD   losartan (COZAAR) 25 MG tablet Take 1 tablet by mouth Daily. 12/11/19   Jaswant Villegas MD   midodrine (PROAMATINE) 5 MG tablet Take 5 mg by mouth Take As Directed. as needed for low blood pressure during dialysis.    Steve Warren MD   Netarsudil Dimesylate (RHOPRESSA) 0.02 % solution Administer 1 drop into the left eye Every  "Night.    Steve Warren MD   pravastatin (PRAVACHOL) 10 MG tablet Take 10 mg by mouth Every Night.    Steve Warren MD   sevelamer (RENAGEL) 800 MG tablet Take 1,600 mg by mouth 3 (Three) Times a Day With Meals.    Steve Warren MD   timolol (TIMOPTIC) 0.5 % ophthalmic solution Administer 1 drop into the left eye Every Morning.    Steve Warren MD   travoprost, JUDE free, (TRAVATAN Z) 0.004 % solution ophthalmic solution Administer 1 drop into the left eye Every Night.    Steve Warren MD     Objective     Vital Signs: /78   Pulse 89   Temp 94.8 °F (34.9 °C) (Axillary)   Resp 20   Ht 149.9 cm (59\")   Wt 54.4 kg (120 lb)   SpO2 99%   BMI 24.24 kg/m²    Physical Exam   Constitutional: She is oriented to person, place, and time. She appears well-developed and well-nourished.   HENT:   Head: Normocephalic and atraumatic.   Right Ear: External ear normal.   Left Ear: External ear normal.   Eyes: Pupils are equal, round, and reactive to light. EOM are normal. Right eye exhibits no discharge. Left eye exhibits no discharge.   Neck: Normal range of motion. Neck supple. No JVD present. No tracheal deviation present. No thyromegaly present.   Cardiovascular: Normal rate, regular rhythm and normal heart sounds.   No murmur heard.  AV fistula Left arm with good bruit   Pulmonary/Chest: No stridor. She is in respiratory distress. She has wheezes. She has rales. She exhibits no tenderness.   Abdominal: Soft. Bowel sounds are normal. She exhibits no distension and no mass. There is no tenderness. There is no guarding.   Musculoskeletal: Normal range of motion. She exhibits edema. She exhibits no tenderness or deformity.   Neurological: She is alert and oriented to person, place, and time. She displays normal reflexes. No cranial nerve deficit. She exhibits normal muscle tone. Coordination normal.   Skin: Skin is warm. Capillary refill takes less than 2 seconds. She is not " diaphoretic. No erythema.     Results Reviewed:  Lab Results (last 24 hours)     Procedure Component Value Units Date/Time    BNP [[id number]]  (Abnormal) Collected:  01/19/20 1956    Specimen:  Blood Updated:  01/19/20 2054     proBNP 30,479.0 pg/mL     Narrative:       Among patients with dyspnea, NT-proBNP is highly sensitive for the detection of acute congestive heart failure. In addition NT-proBNP of <300 pg/ml effectively rules out acute congestive heart failure with 99% negative predictive value.    Results may be falsely decreased if patient taking Biotin.      Troponin [[id number]]  (Abnormal) Collected:  01/19/20 1956    Specimen:  Blood Updated:  01/19/20 2043     Troponin T 0.031 ng/mL     Narrative:       Troponin T Reference Range:  <= 0.03 ng/mL-   Negative for AMI  >0.03 ng/mL-     Abnormal for myocardial necrosis.  Clinicians would have to utilize clinical acumen, EKG, Troponin and serial changes to determine if it is an Acute Myocardial Infarction or myocardial injury due to an underlying chronic condition.       Results may be falsely decreased if patient taking Biotin.      Comprehensive Metabolic Panel [[id number]]  (Abnormal) Collected:  01/19/20 1956    Specimen:  Blood Updated:  01/19/20 2042     Glucose 227 mg/dL      BUN 35 mg/dL      Creatinine 7.96 mg/dL      Sodium 131 mmol/L      Potassium 5.5 mmol/L      Chloride 88 mmol/L      CO2 27.0 mmol/L      Calcium 9.2 mg/dL      Total Protein 7.1 g/dL      Albumin 4.40 g/dL      ALT (SGPT) 9 U/L      AST (SGOT) 17 U/L      Alkaline Phosphatase 61 U/L      Total Bilirubin 0.3 mg/dL      eGFR Non African Amer 5 mL/min/1.73      Comment: <15 Indicative of kidney failure.        eGFR   Amer --     Comment: <15 Indicative of kidney failure.        Globulin 2.7 gm/dL      A/G Ratio 1.6 g/dL      BUN/Creatinine Ratio 4.4     Anion Gap 16.0 mmol/L     Narrative:       GFR Normal >60  Chronic Kidney Disease <60  Kidney Failure <15      Magnesium  [[id number]]  (Normal) Collected:  01/19/20 1956    Specimen:  Blood Updated:  01/19/20 2042     Magnesium 2.4 mg/dL     D-dimer, Quantitative [[id number]]  (Abnormal) Collected:  01/19/20 1956    Specimen:  Blood Updated:  01/19/20 2038     D-Dimer, Quantitative 2.31 mg/L (FEU)     Narrative:       Reference Range is 0-0.50 mg/L FEU. However, results <0.50 mg/L FEU tends to rule out DVT or PE. Results >0.50 mg/L FEU are not useful in predicting absence or presence of DVT or PE.      Blood Gas, Venous [[id number]] Collected:  01/19/20 1956    Specimen:  Venous Blood Updated:  01/19/20 2030    Blood Culture - Blood, Arm, Right [[id number]] Collected:  01/19/20 1956    Specimen:  Blood from Arm, Right Updated:  01/19/20 2030    Blood Gas, Venous [[id number]]  (Abnormal) Collected:  01/19/20 1955    Specimen:  Venous Blood Updated:  01/19/20 2000     Site OTHER     pH, Venous 7.322 pH Units      pCO2, Venous 54.4 mm Hg      Comment: 83 Value above reference range        pO2, Venous 21.9 mm Hg      Comment: 84 Value below reference range        HCO3, Venous 28.1 mmol/L      Comment: 83 Value above reference range        Base Excess, Venous 1.3 mmol/L      O2 Saturation, Venous 33.9 %      Comment: 84 Value below reference range        Temperature 37.0 C      Barometric Pressure for Blood Gas 763 mmHg      Modality BiPap     FIO2 40 %      Ventilator Mode NIV     Set [facility] Resp Rate 12.0     IPAP 16     Comment: Meter: [id number]     :  [id number]        EPAP 6     Collected by [id number]    CBC & Differential [[id number]] Collected:  01/19/20 1926    Specimen:  Blood Updated:  01/19/20 1933    Narrative:       The following orders were created for panel order CBC & Differential.  Procedure                               Abnormality         Status                     ---------                               -----------         ------                     CBC Auto Differential[[id number]]        Abnormal            Final  result                 Please view results for these tests on the individual orders.    CBC Auto Differential [[id number]]  (Abnormal) Collected:  01/19/20 1926    Specimen:  Blood Updated:  01/19/20 1933     WBC 15.15 10*3/mm3      RBC 3.78 10*6/mm3      Hemoglobin 11.9 g/dL      Hematocrit 36.8 %      MCV 97.4 fL      MCH 31.5 pg      MCHC 32.3 g/dL      RDW 15.9 %      RDW-SD 55.8 fl      MPV 10.2 fL      Platelets 310 10*3/mm3      Neutrophil % 50.2 %      Lymphocyte % 35.4 %      Monocyte % 3.2 %      Eosinophil % 10.0 %      Basophil % 0.7 %      Immature Grans % 0.5 %      Neutrophils, Absolute 7.61 10*3/mm3      Lymphocytes, Absolute 5.37 10*3/mm3      Monocytes, Absolute 0.48 10*3/mm3      Eosinophils, Absolute 1.52 10*3/mm3      Basophils, Absolute 0.10 10*3/mm3      Immature Grans, Absolute 0.07 10*3/mm3      nRBC 0.1 /100 WBC     Sherman Draw [[id number]] Collected:  01/19/20 1820    Specimen:  Blood Updated:  01/19/20 1930    Narrative:       The following orders were created for panel order Sherman Draw.  Procedure                               Abnormality         Status                     ---------                               -----------         ------                     Light Blue Top[[id number]]                                   Final result               Green Top (Gel)[[id number]]                                  Final result               Lavender Top[[id number]]                                     Final result               Red Top[[id number]]                                          Final result                 Please view results for these tests on the individual orders.    Lavender Top [[id number]] Collected:  01/19/20 1820    Specimen:  Blood Updated:  01/19/20 1930     Extra Tube hold for add-on     Comment: Auto resulted       Light Blue Top [[id number]] Collected:  01/19/20 1821    Specimen:  Blood Updated:  01/19/20 1930     Extra Tube hold for add-on     Comment: Auto resulted       Green  Top (Gel) [[id number]] Collected:  01/19/20 1820    Specimen:  Blood Updated:  01/19/20 1930     Extra Tube Hold for add-ons.     Comment: Auto resulted.       Red Top [[id number]] Collected:  01/19/20 1820    Specimen:  Blood Updated:  01/19/20 1930     Extra Tube Hold for add-ons.     Comment: Auto resulted.       Lactic Acid, Plasma [[id number]]  (Abnormal) Collected:  01/19/20 1825    Specimen:  Blood Updated:  01/19/20 1859     Lactate 2.6 mmol/L     Lactic Acid, Reflex Timer (This will reflex a repeat order 3-3:15 hours after ordered.) [[id number]] Collected:  01/19/20 1825    Specimen:  Blood Updated:  01/19/20 1859    Blood Culture - Blood, Arm, Right [[id number]] Collected:  01/19/20 1825    Specimen:  Blood from Arm, Right Updated:  01/19/20 1846        Imaging Results (Last 24 Hours)     Procedure Component Value Units Date/Time    XR Chest 1 View [[id number]] Collected:  01/19/20 1848     Updated:  01/19/20 1855    Narrative:       EXAMINATION: XR CHEST 1 VW- 1/19/2020 6:48 PM CST     HISTORY: SOB.     REPORT: A frontal view the chest is compared with the chest x-ray  12/06/2019.     There are coarse interstitial markings as before, improved aeration of  the lung bases is demonstrated. There is no focal consolidation. Heart  size is normal. No pneumothorax or pleural effusion is identified. There  is a vascular stent in the left subclavian region. This is stable.       Impression:       Coarse interstitial markings as before, without lung  consolidation. There is improvement in aeration of the lung bases. No  evidence of overt CHF is identified.  This report was finalized on 01/19/2020 18:52 by Dr. Colton Hampton MD.        Echocardiogram 12/08/19  · Left ventricular wall thickness is consistent with mild concentric hypertrophy.  · Left ventricular systolic function is normal. Estimated ejection fraction is 66 to 70%.  · Left ventricular diastolic dysfunction (grade I) consistent with impaired  relaxation.  · Normal right ventricular cavity size and systolic function noted.  · There is no significant (greater than mild) valvular dysfunction.      I have personally reviewed and interpreted the radiology studies and ECG obtained at time of admission.     Assessment / Plan     Assessment:   Active Hospital Problems    Diagnosis   • **Respiratory distress   • Anemia due to chronic kidney disease, on chronic dialysis (CMS/[facility])   • Non-compliance with renal dialysis (CMS/[facility])   • Elevated troponin due to ESRD    • Type 2 diabetes mellitus, with long-term current use of insulin (CMS/[facility])   • End stage renal failure on dialysis (CMS/[facility])   • Diabetic nephropathy (CMS/[facility])   • HTN (hypertension)     Plan:   Admit to critical care.   CTA chest rule out PE due to sudden onset of symptoms.   Empiric COPD treatment with Solumedrol/Duoneb/Albuterol.  Nephrology for HD in AM. Bumex IV for now.  Reconcile home medications  DM > Humalog sliding scale  DVT prophylaxis for now Lovenox   Follow troponin trend  Smoking cessation advised    Code Status: Full     I discussed the patient's findings and my recommendations with the patient and     Estimated length of stay over 2 midnights    Hang Giles MD   01/19/20   9:14 PM            Electronically signed by Hagn Giles MD at 01/19/20 2140          Emergency Department Notes      Angel Rodriguez Jr., MD at 01/19/20 1830      Procedure Orders    1. Critical Care [[id number]] ordered by Angel Rodriguez Jr., MD at 01/19/20 2112                Subjective   Patient is brought to emergency room by ambulance with a complaint from her significant other that she became suddenly weak and feeling bad about an hour prior to arrival.  She also developed a severe shortness of breath.  He checked her blood pressure and found it to be 110/40 so she took 1 of her pills that she normally takes before dialysis so she does not have a drop in her blood pressure.   He says when EMS got there it was 200/100.  EMS tells us that her pulse ox was in the 70% range so they put her on CPAP to bring her in.  She does smoke.  Her significant other says that she missed her dialysis yesterday.  However he says she was feeling fine until about an hour prior to arrival here.      History provided by:  Significant other  History limited by:  Acuity of condition   used: No    Shortness of Breath   Severity:  Severe  Onset quality:  Sudden  Duration:  1 hour  Timing:  Constant  Progression:  Unchanged  Chronicity:  New  Context: not activity, not animal exposure, not emotional upset, not fumes, not known allergens, not occupational exposure, not pollens, not smoke exposure, not strong odors, not URI and not weather changes    Relieved by:  Nothing  Worsened by:  Nothing  Ineffective treatments:  None tried  Associated symptoms: cough    Associated symptoms: no abdominal pain, no chest pain, no claudication, no diaphoresis, no ear pain, no fever, no headaches, no hemoptysis, no neck pain, no PND, no rash, no sore throat, no sputum production, no syncope, no swollen glands, no vomiting and no wheezing    Risk factors: tobacco use    Risk factors: no recent alcohol use, no family hx of DVT, no hx of cancer, no hx of PE/DVT, no obesity, no oral contraceptive use, no prolonged immobilization and no recent surgery        Review of Systems   Constitutional: Negative.  Negative for diaphoresis and fever.   HENT: Negative.  Negative for ear pain and sore throat.    Respiratory: Positive for cough and shortness of breath. Negative for hemoptysis, sputum production and wheezing.    Cardiovascular: Negative.  Negative for chest pain, claudication, syncope and PND.   Gastrointestinal: Negative.  Negative for abdominal pain and vomiting.   Genitourinary: Negative.    Musculoskeletal: Negative.  Negative for neck pain.   Skin: Negative.  Negative for rash.   Neurological: Negative.   Negative for headaches.   Hematological: Negative.    Psychiatric/Behavioral: Negative.    All other systems reviewed and are negative.  Objective   Physical Exam   Constitutional: She appears well-developed and well-nourished. She appears distressed.   HENT:   Head: Normocephalic and atraumatic.   Neck: Normal range of motion. Neck supple.   Cardiovascular: Normal rate and regular rhythm.   Pulmonary/Chest: Tachypnea noted. She is in respiratory distress. She has wheezes in the right middle field and the left middle field. She has rales in the right middle field and the left middle field.   Abdominal: Soft. Bowel sounds are normal.   Musculoskeletal: Normal range of motion.   Neurological:   lethargic   Skin: Skin is warm.   clammy   Psychiatric: She has a normal mood and affect. Her behavior is normal.   Nursing note and vitals reviewed.      Critical Care  Performed by: Angel Rodriguez Jr., MD  Authorized by: Angel Rodriguez Jr., MD     Critical care provider statement:     Critical care time (minutes):  30    Critical care start time:  1/19/2020 6:15 PM    Critical care end time:  1/19/2020 6:45 PM    Critical care time was exclusive of:  Separately billable procedures and treating other patients    Critical care was necessary to treat or prevent imminent or life-threatening deterioration of the following conditions:  Respiratory failure    Critical care was time spent personally by me on the following activities:  Blood draw for specimens, development of treatment plan with patient or surrogate, discussions with primary provider, evaluation of patient's response to treatment, examination of patient, interpretation of cardiac output measurements, obtaining history from patient or surrogate, ordering and performing treatments and interventions, ordering and review of laboratory studies, ordering and review of radiographic studies, pulse oximetry and re-evaluation of patient's condition    I assumed direction  of critical care for this patient from another provider in my specialty: no              ED Course  ED Course as of Jan 19 2113   Sun Jan 19, 2020 2028 Patient is much better now.  Her pulse ox is in the 95 to 98% range.  She is alert and talkative.    [TR]   2104 We finally got the patient's chemistries back.  I spoke with Dr. Steiner about admission.  He is coming to see the patient.  She is much improved from her presentation initially.    [TR]      ED Course User Index  [TR] Angel Rodriguez Jr., MD          MDM  Number of Diagnoses or Management Options  Respiratory distress: new and requires workup     Amount and/or Complexity of Data Reviewed  Clinical lab tests: ordered and reviewed  Tests in the radiology section of CPT®:  ordered and reviewed  Tests in the medicine section of CPT®:  reviewed and ordered  Discuss the patient with other providers: yes    Risk of Complications, Morbidity, and/or Mortality  Presenting problems: high  Diagnostic procedures: moderate  Management options: high    Critical Care  Total time providing critical care: 30-74 minutes    Patient Progress  Patient progress: stable      Final diagnoses:   Respiratory distress          Angel Rodriguez Jr., MD  01/19/20 2113      Electronically signed by Angel Rodriguez Jr., MD at 01/19/20 2113     Zachary Lozano, RN at 01/19/20 1933        PHLEBOTOMY AT BEDSIDE.     Zachary Lozano, RN  01/19/20 1934      Electronically signed by Zachary Lozano, RN at 01/19/20 1934       Vital Signs (last day)     Date/Time   Temp   Temp src   Pulse   Resp   BP   Patient Position   SpO2    01/20/20 1045   98.9 (37.2)   Oral   93   16   173/75   Lying   97    01/20/20 1000   --   --   93   --   148/82   --   98    01/20/20 0930   --   --   92   --   134/48   --   94    01/20/20 0900   --   --   96   --   166/72   --   97    01/20/20 0830   --   --   96   --   175/77   --   100    01/20/20 0800   98.2 (36.8)   --   97   --   172/74   --   (!)  77    01/20/20 0730   --   --   93   --   161/80   --   97    01/20/20 0700   --   --   92   --   136/80   --   --    01/20/20 0631   --   --   93   --   --   --   --    01/20/20 0630   --   --   92   --   153/77   --   --    01/20/20 0622   --   --   94   14   --   --   96    01/20/20 0600   --   --   94   --   167/80   --   99    01/20/20 0530   --   --   84   --   133/56   --   100    01/20/20 0515   --   --   84   --   137/61   --   100    01/20/20 0500   --   --   85   15   132/58   --   100    01/20/20 0445   --   --   85   --   130/61   --   100    01/20/20 0430   --   --   85   --   129/64   --   99    01/20/20 0415   --   --   89   --   146/62   --   100    01/20/20 0400   --   --   85   16   125/53   --   99    01/20/20 0350   98.3 (36.8)   Oral   --   --   --   --   --    01/19/20 1915   --   --   94   22   173/100   --   97    01/19/20 1825   94.8 (34.9)   Axillary   100   25   178/87   --   96    01/19/20 1815   --   --   103   26   --   --   90              Oxygen Therapy (last day)     Date/Time   SpO2   Device (Oxygen Therapy)   Flow (L/min)   Oxygen Concentration (%)   ETCO2 (mmHg)    01/20/20 1045   97   nasal cannula   2   --   --    01/20/20 1000   98   --   --   --   --    01/20/20 0930   94   --   --   --   --    01/20/20 0900   97   --   --   --   --    01/20/20 0830   100   --   --   --   --    01/20/20 0815   --   nasal cannula   2   --   --    01/20/20 0800   (!) 77   --   --   --   --    01/20/20 0730   97   --   --   --   --    01/20/20 0622   96   nasal cannula   3   --   --    01/20/20 0024   94   nasal cannula   3   --   --    01/20/20 0020   95   nasal cannula   3   --   --    01/20/20 0015   95   --   --   --   --    01/20/20 0000   95   nasal cannula   4   --   --    01/19/20 2345   93   --   --   --   --    01/19/20 2300   93   --   --   --   --    01/19/20 2245   94   --   --   --   --    01/19/20 2229   97   --   --   --   --    01/19/20 2223   --   nasal cannula   4   --   --     01/19/20 2200   97   --   --   --   --    01/19/20 2155 99   nasal cannula   2   --   --    01/19/20 2045   99   --   --   --   --    01/19/20 2030   97   --   --   --   --    01/19/20 2000   97   --   --   --   --    01/19/20 1930   98   --   --   --   --    01/19/20 1915   97   --   --   --   --    01/19/20 1825   96   CPAP   --   --   --    01/19/20 1815   90   NPPV/NIV   --   40   --              Intake & Output (last day)       01/19 0701 - 01/20 0700 01/20 0701 - 01/21 0700    P.O. 50     Total Intake(mL/kg) 50 (0.8)     Urine (mL/kg/hr) 350     Total Output 350     Net -300               Lines, Drains & Airways    Active LDAs     Name:   Placement date:   Placement time:   Site:   Days:    Peripheral IV 01/19/20 2206 Right    01/19/20 2206    (S) -- shoulder   less than 1    Peripheral IV 01/20/20 1025 Anterior;Right Forearm   01/20/20    1025    Forearm   less than 1         Inactive LDAs     Name:   Placement date:   Placement time:   Removal date:   Removal time:   Site:   Days:    [REMOVED] Peripheral IV 01/19/20 1817 Right Hand   01/19/20 1817 01/20/20    0215    Hand   less than 1                  Facility-Administered Medications as of 1/20/2020   Medication Dose Route Frequency Provider Last Rate Last Dose   • acetaminophen (TYLENOL) tablet 650 mg  650 mg Oral Q4H PRN Hang Giles MD       • albuterol (PROVENTIL) nebulizer solution 0.083% 2.5 mg/3mL  2.5 mg Nebulization Q4H PRN Hang Giles MD       • ALPRAZolam (XANAX) tablet 0.5 mg  0.5 mg Oral Q8H PRN Hang Giles MD       • aspirin EC tablet 81 mg  81 mg Oral Daily Sundeep Aldridge MD   81 mg at 01/20/20 1013   • brimonidine (ALPHAGAN) 0.2 % ophthalmic solution 1 drop  1 drop Left Eye TID Sundeep Aldridge MD   1 drop at 01/20/20 1013   • [COMPLETED] bumetanide (BUMEX) injection 0.5 mg  0.5 mg Intravenous Once Hang Giles MD   0.5 mg at 01/19/20 4075   • [START ON  1/21/2020] calcitriol (ROCALTROL) capsule 0.25 mcg  0.25 mcg Oral Once per day on Tue Thu Sat Sundeep Aldridge MD       • carvedilol (COREG) tablet 6.25 mg  6.25 mg Oral BID With Meals Hang Giles MD   6.25 mg at 01/20/20 0818   • cetirizine (zyrTEC) tablet 10 mg  10 mg Oral Nightly PRN Sundeep Aldridge MD       • cholecalciferol (VITAMIN D3) tablet 2,000 Units  2,000 Units Oral Daily Sundeep Aldridge MD   2,000 Units at 01/20/20 1013   • dextrose (D50W) 25 g/ 50mL Intravenous Solution 25 g  25 g Intravenous Q15 Min PRN Hang Giles MD       • dextrose (GLUTOSE) oral gel 15 g  15 g Oral Q15 Min PRN Hang Giles MD       • dorzolamide (TRUSOPT) 2 % ophthalmic solution 1 drop  1 drop Left Eye BID Sundeep Aldridge MD   1 drop at 01/20/20 1013   • enoxaparin (LOVENOX) syringe 30 mg  30 mg Subcutaneous Q24H Hnag Giles MD   30 mg at 01/20/20 0818   • famotidine (PEPCID) tablet 20 mg  20 mg Oral BID AC Sundeep Aldridge MD   20 mg at 01/20/20 1013   • fluticasone (FLONASE) 50 MCG/ACT nasal spray 1 spray  1 spray Nasal BID Sundeep Aldridge MD   1 spray at 01/20/20 1013   • glucagon (human recombinant) (GLUCAGEN DIAGNOSTIC) injection 1 mg  1 mg Subcutaneous Q15 Min PRN Hang Giles MD       • insulin detemir (LEVEMIR) injection 10 Units  10 Units Subcutaneous Nightly Sundeep Aldridge MD       • insulin lispro (humaLOG) injection 0-9 Units  0-9 Units Subcutaneous 4x Daily With Meals & Nightly Hang Giles MD   4 Units at 01/20/20 0817   • [COMPLETED] iopamidol (ISOVUE-370) 76 % injection 100 mL  100 mL Intravenous Once in imaging Angel Rodriguez Jr., MD   100 mL at 01/19/20 2218   • ipratropium-albuterol (DUO-NEB) nebulizer solution 3 mL  3 mL Nebulization Q6H - RT Hang Giles MD   3 mL at 01/20/20 0622   • latanoprost (XALATAN) 0.005 % ophthalmic solution 1 drop  1 drop Left  Eye Nightly Sundeep Aldridge MD       • losartan (COZAAR) tablet 25 mg  25 mg Oral Daily Sundeep Aldridge MD   25 mg at 01/20/20 1013   • [COMPLETED] methylPREDNISolone sodium succinate (SOLU-Medrol) injection 125 mg  125 mg Intravenous Once Angel Rodriguez Jr., MD   125 mg at 01/19/20 2123   • methylPREDNISolone sodium succinate (SOLU-Medrol) injection 40 mg  40 mg Intravenous Q12H Hang Giles MD   40 mg at 01/20/20 0818   • Morphine sulfate (PF) injection 1 mg  1 mg Intravenous Q4H PRN Hang Giles MD        And   • naloxone (NARCAN) injection 0.4 mg  0.4 mg Intravenous Q5 Min PRN Hang Giles MD       • nicotine (NICODERM CQ) 21 MG/24HR patch 1 patch  1 patch Transdermal Nightly Hang Giles MD   1 patch at 01/19/20 2316   • nitroglycerin (NITROSTAT) SL tablet 0.4 mg  0.4 mg Sublingual Q5 Min PRN Hang Giles MD       • ondansetron (ZOFRAN) injection 4 mg  4 mg Intravenous Q6H PRN Hang Giles MD       • [COMPLETED] perflutren (DEFINITY) lipid microspheres injection 9.78 mg  1.5 mL Intravenous Once Sundeep Aldridge MD   9.78 mg at 01/20/20 1027   • pravastatin (PRAVACHOL) tablet 10 mg  10 mg Oral Nightly Sundeep Aldridge MD       • sennosides-docusate (PERICOLACE) 8.6-50 MG per tablet 2 tablet  2 tablet Oral Nightly Hang Giles MD       • sodium chloride 0.9 % flush 10 mL  10 mL Intravenous PRN Hang Giles MD       • sodium chloride 0.9 % flush 10 mL  10 mL Intravenous Q12H Hang Giles MD   10 mL at 01/20/20 0823   • sodium chloride 0.9 % flush 10 mL  10 mL Intravenous PRN Hang Giles MD       • timolol (TIMOPTIC) 0.5 % ophthalmic solution 1 drop  1 drop Left Eye Sundeep Madison MD   1 drop at 01/20/20 1013     Blood Administration Record (From admission, onward)    None        Lab Results (last 48 hours)     Procedure Component Value  Units Date/Time    POC Glucose Once [[id number]]  (Abnormal) Collected:  01/20/20 1048    Specimen:  Blood Updated:  01/20/20 1112     Glucose 281 mg/dL      Comment: : [id number] Merritt Jaimes ID: [id number]       Blood Culture - Blood, Arm, Right [[id number]] Collected:  01/19/20 1956    Specimen:  Blood from Arm, Right Updated:  01/20/20 0849     Blood Culture No growth at less than 24 hours    POC Glucose Once [[id number]]  (Abnormal) Collected:  01/20/20 0742    Specimen:  Blood Updated:  01/20/20 0804     Glucose 208 mg/dL      Comment: : [id number] Anabela Bates ID: [id number]       Blood Culture - Blood, Arm, Right [[id number]] Collected:  01/19/20 1825    Specimen:  Blood from Arm, Right Updated:  01/20/20 0700     Blood Culture No growth at less than 24 hours    Troponin [[id number]]  (Abnormal) Collected:  01/20/20 0422    Specimen:  Blood Updated:  01/20/20 0518     Troponin T 0.055 ng/mL     Narrative:       Troponin T Reference Range:  <= 0.03 ng/mL-   Negative for AMI  >0.03 ng/mL-     Abnormal for myocardial necrosis.  Clinicians would have to utilize clinical acumen, EKG, Troponin and serial changes to determine if it is an Acute Myocardial Infarction or myocardial injury due to an underlying chronic condition.       Results may be falsely decreased if patient taking Biotin.      Basic Metabolic Panel [[id number]]  (Abnormal) Collected:  01/20/20 0249    Specimen:  Blood Updated:  01/20/20 0343     Glucose 144 mg/dL      BUN 38 mg/dL      Creatinine 8.50 mg/dL      Sodium 132 mmol/L      Potassium 5.5 mmol/L      Chloride 91 mmol/L      CO2 26.0 mmol/L      Calcium 9.4 mg/dL      eGFR   Amer --     Comment: <15 Indicative of kidney failure.        eGFR Non African Amer 5 mL/min/1.73      Comment: <15 Indicative of kidney failure.        BUN/Creatinine Ratio 4.5     Anion Gap 15.0 mmol/L     Narrative:       GFR Normal >60  Chronic Kidney Disease <60  Kidney Failure <15       Phosphorus [[id number]]  (Abnormal) Collected:  01/20/20 0249    Specimen:  Blood Updated:  01/20/20 0343     Phosphorus 6.5 mg/dL     Magnesium [[id number]]  (Abnormal) Collected:  01/20/20 0249    Specimen:  Blood Updated:  01/20/20 0343     Magnesium 2.6 mg/dL     CBC Auto Differential [[id number]]  (Abnormal) Collected:  01/20/20 0249    Specimen:  Blood Updated:  01/20/20 0331     WBC 8.06 10*3/mm3      RBC 3.10 10*6/mm3      Hemoglobin 9.9 g/dL      Hematocrit 29.7 %      MCV 95.8 fL      MCH 31.9 pg      MCHC 33.3 g/dL      RDW 15.2 %      RDW-SD 52.3 fl      MPV 9.4 fL      Platelets 207 10*3/mm3      Neutrophil % 90.0 %      Lymphocyte % 8.1 %      Monocyte % 1.1 %      Eosinophil % 0.2 %      Basophil % 0.2 %      Immature Grans % 0.4 %      Neutrophils, Absolute 7.25 10*3/mm3      Lymphocytes, Absolute 0.65 10*3/mm3      Monocytes, Absolute 0.09 10*3/mm3      Eosinophils, Absolute 0.02 10*3/mm3      Basophils, Absolute 0.02 10*3/mm3      Immature Grans, Absolute 0.03 10*3/mm3      nRBC 0.0 /100 WBC     Hemoglobin A1c [[id number]]  (Normal) Collected:  01/19/20 1926    Specimen:  Blood Updated:  01/20/20 0047     Hemoglobin A1C 5.00 %     Narrative:       Hemoglobin A1C Ranges:    Increased Risk for Diabetes  5.7% to 6.4%  Diabetes                     >= 6.5%  Diabetic Goal                < 7.0%    POC Glucose Once [[id number]]  (Abnormal) Collected:  01/19/20 2315    Specimen:  Blood Updated:  01/19/20 2335     Glucose 168 mg/dL      Comment: : [id number] [place]: [id number]       Troponin [[id number]]  (Abnormal) Collected:  01/19/20 2241    Specimen:  Blood Updated:  01/19/20 2310     Troponin T 0.069 ng/mL     Narrative:       Troponin T Reference Range:  <= 0.03 ng/mL-   Negative for AMI  >0.03 ng/mL-     Abnormal for myocardial necrosis.  Clinicians would have to utilize clinical acumen, EKG, Troponin and serial changes to determine if it is an Acute Myocardial Infarction or  myocardial injury due to an underlying chronic condition.       Results may be falsely decreased if patient taking Biotin.      Lactic Acid, Reflex [[id number]]  (Normal) Collected:  01/19/20 2207    Specimen:  Blood Updated:  01/19/20 2228     Lactate 1.2 mmol/L     Lactic Acid, Reflex Timer (This will reflex a repeat order 3-3:15 hours after ordered.) [[id number]] Collected:  01/19/20 1825    Specimen:  Blood Updated:  01/19/20 2200     Extra Tube Hold for add-ons.     Comment: Auto resulted.       BNP [[id number]]  (Abnormal) Collected:  01/19/20 1956    Specimen:  Blood Updated:  01/19/20 2054     proBNP 30,479.0 pg/mL     Narrative:       Among patients with dyspnea, NT-proBNP is highly sensitive for the detection of acute congestive heart failure. In addition NT-proBNP of <300 pg/ml effectively rules out acute congestive heart failure with 99% negative predictive value.    Results may be falsely decreased if patient taking Biotin.      Troponin [[id number]]  (Abnormal) Collected:  01/19/20 1956    Specimen:  Blood Updated:  01/19/20 2043     Troponin T 0.031 ng/mL     Narrative:       Troponin T Reference Range:  <= 0.03 ng/mL-   Negative for AMI  >0.03 ng/mL-     Abnormal for myocardial necrosis.  Clinicians would have to utilize clinical acumen, EKG, Troponin and serial changes to determine if it is an Acute Myocardial Infarction or myocardial injury due to an underlying chronic condition.       Results may be falsely decreased if patient taking Biotin.      Comprehensive Metabolic Panel [[id number]]  (Abnormal) Collected:  01/19/20 1956    Specimen:  Blood Updated:  01/19/20 2042     Glucose 227 mg/dL      BUN 35 mg/dL      Creatinine 7.96 mg/dL      Sodium 131 mmol/L      Potassium 5.5 mmol/L      Chloride 88 mmol/L      CO2 27.0 mmol/L      Calcium 9.2 mg/dL      Total Protein 7.1 g/dL      Albumin 4.40 g/dL      ALT (SGPT) 9 U/L      AST (SGOT) 17 U/L      Alkaline Phosphatase 61 U/L      Total Bilirubin 0.3  mg/dL      eGFR Non African Amer 5 mL/min/1.73      Comment: <15 Indicative of kidney failure.        eGFR   Amer --     Comment: <15 Indicative of kidney failure.        Globulin 2.7 gm/dL      A/G Ratio 1.6 g/dL      BUN/Creatinine Ratio 4.4     Anion Gap 16.0 mmol/L     Narrative:       GFR Normal >60  Chronic Kidney Disease <60  Kidney Failure <15      Magnesium [[id number]]  (Normal) Collected:  01/19/20 1956    Specimen:  Blood Updated:  01/19/20 2042     Magnesium 2.4 mg/dL     D-dimer, Quantitative [[id number]]  (Abnormal) Collected:  01/19/20 1956    Specimen:  Blood Updated:  01/19/20 2038     D-Dimer, Quantitative 2.31 mg/L (FEU)     Narrative:       Reference Range is 0-0.50 mg/L FEU. However, results <0.50 mg/L FEU tends to rule out DVT or PE. Results >0.50 mg/L FEU are not useful in predicting absence or presence of DVT or PE.      Blood Gas, Venous [[id number]] Collected:  01/19/20 1956    Specimen:  Venous Blood Updated:  01/19/20 2030    Blood Gas, Venous [[id number]]  (Abnormal) Collected:  01/19/20 1955    Specimen:  Venous Blood Updated:  01/19/20 2000     Site OTHER     pH, Venous 7.322 pH Units      pCO2, Venous 54.4 mm Hg      Comment: 83 Value above reference range        pO2, Venous 21.9 mm Hg      Comment: 84 Value below reference range        HCO3, Venous 28.1 mmol/L      Comment: 83 Value above reference range        Base Excess, Venous 1.3 mmol/L      O2 Saturation, Venous 33.9 %      Comment: 84 Value below reference range        Temperature 37.0 C      Barometric Pressure for Blood Gas 763 mmHg      Modality BiPap     FIO2 40 %      Ventilator Mode NIV     Set [facility] Resp Rate 12.0     IPAP 16     Comment: Meter: [id number]     :  [id number]        EPAP 6     Collected by [id number]    CBC & Differential [[id number]] Collected:  01/19/20 1926    Specimen:  Blood Updated:  01/19/20 1933    Narrative:       The following orders were created for panel order CBC &  Differential.  Procedure                               Abnormality         Status                     ---------                               -----------         ------                     CBC Auto Differential[[id number]]        Abnormal            Final result                 Please view results for these tests on the individual orders.    CBC Auto Differential [[id number]]  (Abnormal) Collected:  01/19/20 1926    Specimen:  Blood Updated:  01/19/20 1933     WBC 15.15 10*3/mm3      RBC 3.78 10*6/mm3      Hemoglobin 11.9 g/dL      Hematocrit 36.8 %      MCV 97.4 fL      MCH 31.5 pg      MCHC 32.3 g/dL      RDW 15.9 %      RDW-SD 55.8 fl      MPV 10.2 fL      Platelets 310 10*3/mm3      Neutrophil % 50.2 %      Lymphocyte % 35.4 %      Monocyte % 3.2 %      Eosinophil % 10.0 %      Basophil % 0.7 %      Immature Grans % 0.5 %      Neutrophils, Absolute 7.61 10*3/mm3      Lymphocytes, Absolute 5.37 10*3/mm3      Monocytes, Absolute 0.48 10*3/mm3      Eosinophils, Absolute 1.52 10*3/mm3      Basophils, Absolute 0.10 10*3/mm3      Immature Grans, Absolute 0.07 10*3/mm3      nRBC 0.1 /100 WBC     [place] Draw [[id number]] Collected:  01/19/20 1820    Specimen:  Blood Updated:  01/19/20 1930    Narrative:       The following orders were created for panel order [place] Draw.  Procedure                               Abnormality         Status                     ---------                               -----------         ------                     Light Blue Top[[id number]]                                   Final result               Green Top (Gel)[[id number]]                                  Final result               Lavender Top[[id number]]                                     Final result               Red Top[[id number]]                                          Final result                 Please view results for these tests on the individual orders.    Lavender Top [[id number]] Collected:  01/19/20 1820    Specimen:  Blood  Updated:  01/19/20 1930     Extra Tube hold for add-on     Comment: Auto resulted       Light Blue Top [[id number]] Collected:  01/19/20 1821    Specimen:  Blood Updated:  01/19/20 1930     Extra Tube hold for add-on     Comment: Auto resulted       Green Top (Gel) [[id number]] Collected:  01/19/20 1820    Specimen:  Blood Updated:  01/19/20 1930     Extra Tube Hold for add-ons.     Comment: Auto resulted.       Red Top [[id number]] Collected:  01/19/20 1820    Specimen:  Blood Updated:  01/19/20 1930     Extra Tube Hold for add-ons.     Comment: Auto resulted.       Lactic Acid, Plasma [[id number]]  (Abnormal) Collected:  01/19/20 1825    Specimen:  Blood Updated:  01/19/20 1859     Lactate 2.6 mmol/L           Imaging Results (Last 48 Hours)     Procedure Component Value Units Date/Time    CT Angiogram Chest With & Without Contrast [[id number]] Collected:  01/20/20 0709     Updated:  01/20/20 0719    Narrative:       CT ANGIOGRAM CHEST W WO CONTRAST- 1/19/2020 10:03 PM CST      HISTORY: sudden onset respiratory distress; R06.03-Acute respiratory  distress      COMPARISON: None.      DOSE LENGTH PRODUCT: 422 mGy cm. Automated exposure control was also  utilized to decrease patient radiation dose.     TECHNIQUE: Helical tomographic images of the chest were obtained after  the administration of intravenous contrast following angiogram protocol.  Additionally, 3D and multiplanar reformatted images were provided.        FINDINGS:    Pulmonary arteries: There is adequate enhancement of the pulmonary  arteries to evaluate for central and segmental pulmonary emboli. There  are no filling defects within the main, lobar, segmental or visualized  subsegmental pulmonary arteries. The pulmonary arteries are within  normal limits for size.      Aorta and great vessels: There is atherosclerosis in the aorta without  aneurysm or dissection. There is a stent in the LEFT subclavian vein.  Coronary artery calcifications are present.      Visualized neck base: The imaged portion of the base of the neck appears  unremarkable.      Lungs: Interlobular septal thickening is noted in both lungs. There is a  region of lung collapse in the posterior RIGHT upper lobe. Patchy  groundglass opacities are noted. Peripheral areas of subpleural  atelectasis are seen in both lower lobes. Small pleural effusions are  present bilaterally. The trachea and bronchial tree are patent.      Heart: The heart is normal in size. There is no pericardial effusion.      Mediastinum and lymph nodes: No enlarged mediastinal or hilar lymph  nodes are present. There is a small amount of fluid in the esophagus. In  the axillary tail of the LEFT breast or RIGHT axilla, there is a 1.1 cm  lymph node. No other lymphadenopathy is noted.      Skeletal and soft tissues: The osseous structures of the thorax and  surrounding soft tissues demonstrate no acute process.     Upper abdomen: The imaged portion of the upper abdomen demonstrates no  acute process.        Impression:       1. No evidence of pulmonary embolus.  2. Interstitial edema and small pleural effusions are noted bilaterally.  3. There is a 1.1 cm LEFT axillary lymph node. Please ensure that the  patient is up-to-date with mammography. According to the patient's chart  history, her last mammogram was performed in March 2018.     A preliminary report was provided by [facility]. I  agree with the preliminary interpretation.  This report was finalized on 01/20/2020 07:16 by Dr. Feng Simpson MD.    XR Chest 1 View [[id number]] Collected:  01/19/20 1848     Updated:  01/19/20 1855    Narrative:       EXAMINATION: XR CHEST 1 VW- 1/19/2020 6:48 PM CST     HISTORY: SOB.     REPORT: A frontal view the chest is compared with the chest x-ray  12/06/2019.     There are coarse interstitial markings as before, improved aeration of  the lung bases is demonstrated. There is no focal consolidation. Heart  size is normal.  No pneumothorax or pleural effusion is identified. There  is a vascular stent in the left subclavian region. This is stable.       Impression:       Coarse interstitial markings as before, without lung  consolidation. There is improvement in aeration of the lung bases. No  evidence of overt CHF is identified.  This report was finalized on 01/19/2020 18:52 by Dr. Colton Hampton MD.        Orders (last 24 hrs)      Start     Ordered    01/21/20 0900  calcitriol (ROCALTROL) capsule 0.25 mcg  Once per day on Tue Thu Sat      01/20/20 0857    01/20/20 2100  insulin detemir (LEVEMIR) injection 10 Units  Nightly      01/20/20 0857    01/20/20 2100  pravastatin (PRAVACHOL) tablet 10 mg  Nightly      01/20/20 0857    01/20/20 2100  latanoprost (XALATAN) 0.005 % ophthalmic solution 1 drop  Nightly      01/20/20 0857    01/20/20 1205  Hemodialysis Inpatient  Once      01/20/20 1211    01/20/20 1115  perflutren (DEFINITY) lipid microspheres injection 9.78 mg  Once      01/20/20 1027    01/20/20 1113  POC Glucose Once  Once      01/20/20 1048    01/20/20 0945  aspirin EC tablet 81 mg  Daily      01/20/20 0857    01/20/20 0945  brimonidine (ALPHAGAN) 0.2 % ophthalmic solution 1 drop  3 Times Daily      01/20/20 0857    01/20/20 0945  cholecalciferol (VITAMIN D3) tablet 2,000 Units  Daily      01/20/20 0857    01/20/20 0945  dorzolamide (TRUSOPT) 2 % ophthalmic solution 1 drop  2 Times Daily      01/20/20 0857    01/20/20 0945  famotidine (PEPCID) tablet 20 mg  2 Times Daily Before Meals      01/20/20 0857    01/20/20 0945  fluticasone (FLONASE) 50 MCG/ACT nasal spray 1 spray  2 Times Daily      01/20/20 0857    01/20/20 0945  losartan (COZAAR) tablet 25 mg  Daily      01/20/20 0857    01/20/20 0945  timolol (TIMOPTIC) 0.5 % ophthalmic solution 1 drop  Every Morning      01/20/20 0857    01/20/20 0900  enoxaparin (LOVENOX) syringe 30 mg  Every 24 Hours Scheduled      01/19/20 2228    01/20/20 0900  methylPREDNISolone sodium  succinate (SOLU-Medrol) injection 40 mg  Every 12 Hours      01/19/20 2228    01/20/20 0900  Adult Transthoracic Echo Complete W/ Cont if Necessary Per Protocol  Once      01/20/20 0900    01/20/20 0858  Transfer Patient  Once      01/20/20 0858    01/20/20 0853  cetirizine (zyrTEC) tablet 10 mg  Nightly PRN      01/20/20 0857    01/20/20 0805  POC Glucose Once  Once      01/20/20 0742    01/20/20 0702  Inpatient Nephrology Consult  IN AM     Specialty:  Nephrology  Provider:  Adi Gonzalez MD    01/19/20 2228 01/20/20 0700  POC Glucose 4x Daily AC & at Bedtime  4 Times Daily Before Meals & at Bedtime      01/19/20 2228 01/20/20 0600  CBC Auto Differential  Morning Draw      01/19/20 2228 01/20/20 0600  Basic Metabolic Panel  Morning Draw      01/19/20 2228 01/20/20 0600  Phosphorus  Morning Draw      01/19/20 2228 01/20/20 0600  Magnesium  Morning Draw      01/19/20 2228 01/20/20 0236  PICC Consult  Once,   Status:  Canceled     Provider:  (Not yet assigned)    01/20/20 0236    01/20/20 0100  ipratropium-albuterol (DUO-NEB) nebulizer solution 3 mL  Every 6 Hours - RT      01/19/20 2228 01/19/20 2336  POC Glucose Once  Once      01/19/20 2315    01/19/20 2315  carvedilol (COREG) tablet 6.25 mg  2 Times Daily With Meals      01/19/20 2228 01/19/20 2315  sodium chloride 0.9 % flush 10 mL  Every 12 Hours Scheduled      01/19/20 2228 01/19/20 2315  sennosides-docusate (PERICOLACE) 8.6-50 MG per tablet 2 tablet  Nightly      01/19/20 2228 01/19/20 2315  bumetanide (BUMEX) injection 0.5 mg  Once      01/19/20 2228 01/19/20 2245  insulin lispro (humaLOG) injection 0-9 Units  4 Times Daily With Meals & Nightly      01/19/20 2228 01/19/20 2245  nicotine (NICODERM CQ) 21 MG/24HR patch 1 patch  Nightly      01/19/20 2228 01/19/20 2234  BIPAP  As Needed-RT      01/19/20 2234 01/19/20 2229  Do NOT Hold Basal or Correction Scale Insulin When Patient is NPO, Hold Scheduled  Mealtime (Bolus) Insulin if NPO  Continuous      01/19/20 2228 01/19/20 2229  Follow [facility] Hypoglycemia Standing Orders For Blood Glucose Less Than 70 mg/dL  Until Discontinued      01/19/20 2228 01/19/20 2229  Hypoglycemia Treatment - Alert Patient That is Not NPO and Can Safely Swallow  Until Discontinued     Comments:  Administer 4 oz Fruit Juice OR 4 oz Regular Soda OR 8 oz Milk OR 15-30 grams (1 tube) of Glucose Gel.  Recheck Blood Glucose 15 Minutes After Ingestion, Repeat Treatment & Continue to Recheck Blood Sugar Every 15 Minutes Until Blood Glucose is 70 mg/dL or Higher.  Once Blood Glucose is 70 mg/dL or Higher and if It Will Be more than 60 Minutes Until the Next Meal, Provide Appropriate Snack (Including Carbohydrate Food) Based on Meal Plan Order. Give Meal Tray As Soon As Possible.    01/19/20 2228 01/19/20 2229  Hypoglycemia Treatment - Patient Has IV Access - Unresponsive, NPO or Unable To Safely Swallow  Until Discontinued     Comments:  Administer 25g (50ml) D50W IV Push.  Recheck Blood Glucose 15 Minutes After Administration, if Blood Glucose Remains Less Than 70 mg/dl, Repeat Treatment   Recheck Blood Glucose 15 Minutes After 2nd Administration, if Blood Glucose Remains Less Than 70 mg/dL After 2nd Dose of D50W, Contact Provider for Further Treatment Orders & Consider Adding IVF With D5W for Maintenance    01/19/20 2228 01/19/20 2229  Hypoglycemia Treatment - Patient Without IV Access - Unresponsive, NPO or Unable To Safely Swallow  Until Discontinued     Comments:  Administer 1mg Glucagon SQ & Establish IV Access.  Turn Patient on Side - Nausea / Vomiting May Occur.  Recheck Blood Glucose 15 Minutes After Administration.  If Blood Glucose Remains Less Than 70, Administer 25g D50W IV Push (50ml).  Recheck Blood Glucose 15 Minutes After Administration of D50W, if Blood Glucose Remains Less Than 70 mg/dl, Contact Provider for Further Treatment Orders & Consider Adding IVF With D5 for  Maintenance    01/19/20 2228 01/19/20 2229  Hypoglycemia Treatment - Document Event & Patient Response to Interventions EMR, Document Medications on MAR  Continuous      01/19/20 2228    01/19/20 2229  Notify Provider - Hypoglycemia Treatment  Until Discontinued      01/19/20 2228    01/19/20 2229  Hemoglobin A1c  Once      01/19/20 2228 01/19/20 2229  Vital Signs  Per Hospital Policy      01/19/20 2228    01/19/20 2229  Intake & Output  Every Shift      01/19/20 2228 01/19/20 2229  Weigh Patient  Once      01/19/20 2228 01/19/20 2229  Oxygen Therapy- Nasal Cannula; Titrate for SPO2: 90% - 95%  Continuous      01/19/20 2228    01/19/20 2229  Insert Peripheral IV  Once      01/19/20 2228 01/19/20 2229  Saline Lock & Maintain IV Access  Continuous,   Status:  Canceled      01/19/20 2228 01/19/20 2229  Cardiac Monitoring  Continuous,   Status:  Canceled      01/19/20 2228 01/19/20 2229  Telemetry - Maintain IV Access  Continuous      01/19/20 2228    01/19/20 2229  Continuous Cardiac Monitoring  Continuous      01/19/20 2228 01/19/20 2229  May Be Off Telemetry for Tests  Continuous      01/19/20 2228 01/19/20 2229  ACLS Protocol For Life Threatening Dysrhythmias (Unless Code Status Indicates Otherwise)  Continuous      01/19/20 2228    01/19/20 2229  Notify Provider if ACLS Protocol Activated  Until Discontinued      01/19/20 2228 01/19/20 2229  Diet Regular; Cardiac, Consistent Carbohydrate, Renal  Diet Effective Now      01/19/20 2228    01/19/20 2229  Troponin  Now Then Every 6 Hours     Comments:  If Drawn in ED, Start 6 Hours After Last Draw      01/19/20 2228    01/19/20 2229  Tobacco Cessation Education  Prior to Discharge      01/19/20 2228 01/19/20 2228  albuterol (PROVENTIL) nebulizer solution 0.083% 2.5 mg/3mL  Every 4 Hours PRN      01/19/20 2228    01/19/20 2228  Morphine sulfate (PF) injection 1 mg  Every 4 Hours PRN      01/19/20 2228    01/19/20 2228  naloxone (NARCAN)  injection 0.4 mg  Every 5 Minutes PRN      01/19/20 2228    01/19/20 2228  dextrose (GLUTOSE) oral gel 15 g  Every 15 Minutes PRN      01/19/20 2228    01/19/20 2228  dextrose (D50W) 25 g/ 50mL Intravenous Solution 25 g  Every 15 Minutes PRN      01/19/20 2228    01/19/20 2228  glucagon (human recombinant) (GLUCAGEN DIAGNOSTIC) injection 1 mg  Every 15 Minutes PRN      01/19/20 2228    01/19/20 2228  acetaminophen (TYLENOL) tablet 650 mg  Every 4 Hours PRN      01/19/20 2228    01/19/20 2228  ALPRAZolam (XANAX) tablet 0.5 mg  Every 8 Hours PRN      01/19/20 2228    01/19/20 2228  ondansetron (ZOFRAN) injection 4 mg  Every 6 Hours PRN      01/19/20 2228    01/19/20 2228  sodium chloride 0.9 % flush 10 mL  As Needed      01/19/20 2228 01/19/20 2228  nitroglycerin (NITROSTAT) SL tablet 0.4 mg  Every 5 Minutes PRN      01/19/20 2228    01/19/20 2218  iopamidol (ISOVUE-370) 76 % injection 100 mL  Once in Imaging      01/19/20 2216 01/19/20 2201  Lactic Acid, Reflex  STAT      01/19/20 2200 01/19/20 2135  Code Status and Medical Interventions:  Continuous      01/19/20 2138 01/19/20 2128  CT Angiogram Chest With & Without Contrast  1 Time Imaging      01/19/20 2128 01/19/20 2113  Critical Care  Once     Comments:  This order was created via procedure documentation    01/19/20 2112 01/19/20 2112  Inpatient Admission  Once      01/19/20 2111 01/19/20 2106  methylPREDNISolone sodium succinate (SOLU-Medrol) injection 125 mg  Once      01/19/20 2104 01/19/20 2001  Blood Gas, Venous  Once      01/19/20 1955 01/19/20 1930  Comprehensive Metabolic Panel  Once      01/19/20 1822 01/19/20 1930  BNP  Once      01/19/20 1822 01/19/20 1930  D-dimer, Quantitative  Once      01/19/20 1822 01/19/20 1930  Troponin  Once      01/19/20 1822    01/19/20 1930  Magnesium  Once      01/19/20 1822    01/19/20 1909  Blood Gas, Venous  Once      01/19/20 1908    01/19/20 1900  Lactic Acid, Reflex Timer (This  will reflex a repeat order 3-3:15 hours after ordered.)  Once      01/19/20 1859    01/19/20 1829  Blood Gas, Arterial  Once,   Status:  Canceled      01/19/20 1828    01/19/20 1822  XR Chest 1 View  1 Time Imaging      01/19/20 1822    01/19/20 1822  Insert peripheral IV  Once      01/19/20 1822    01/19/20 1822  Cardiac Monitoring  Once,   Status:  Canceled      01/19/20 1822    01/19/20 1822  Pulse Oximetry, Continuous  Continuous      01/19/20 1822    01/19/20 1822  BIPAP  Until Discontinued,   Status:  Canceled      01/19/20 1822    01/19/20 1821  sodium chloride 0.9 % flush 10 mL  As Needed      01/19/20 1822    01/19/20 1821  CBC & Differential  Once      01/19/20 1822    01/19/20 1821  Blood Culture - Blood,  Once      01/19/20 1822    01/19/20 1821  Blood Culture - Blood,  Once      01/19/20 1822    01/19/20 1821  Lactic Acid, Plasma  Once      01/19/20 1822    01/19/20 1821  ECG 12 Lead  Once      01/19/20 1822    01/19/20 1821  CBC Auto Differential  PROCEDURE ONCE      01/19/20 1822    01/19/20 1818  [place] Draw  Once      01/19/20 1818    01/19/20 1818  Light Blue Top  PROCEDURE ONCE      01/19/20 1818    01/19/20 1818  Green Top (Gel)  PROCEDURE ONCE      01/19/20 1818    01/19/20 1818  Lavender Top  PROCEDURE ONCE      01/19/20 1818    01/19/20 1818  Red Top  PROCEDURE ONCE      01/19/20 1818    01/19/20 1817  ECG 12 Lead  Once      01/19/20 1816    Unscheduled  Telemetry - Pulse Oximetry  Continuous PRN     Comments:  If Patient Develops Unresponsiveness, Acute Dyspnea, Cyanosis or Suspected Hypoxemia Start Continuous Pulse Ox Monitoring, Apply Oxygen & Notify Provider    01/19/20 2228    Unscheduled  Oxygen Therapy- Nasal Cannula; Titrate for SPO2: 90% - 95%  Continuous PRN     Comments:  If Patient Develops Unresponsiveness, Acute Dyspnea, Cyanosis or Suspected Hypoxemia Start Continuous Pulse Ox Monitoring, Apply Oxygen & Notify Provider    01/19/20 7062    Unscheduled  ECG 12 Lead  As Needed      Comments:  Nurse to Release if Patient Expericences Acute Chest Pain or Dysrhythmias    01/19/20 2228    Unscheduled  Potassium  As Needed     Comments:  For Ventricular Arrhythmias      01/19/20 2228    Unscheduled  Magnesium  As Needed     Comments:  For Ventricular Arrhythmias      01/19/20 2228    Unscheduled  Troponin  As Needed     Comments:  For Chest Pain      01/19/20 2228    Unscheduled  Digoxin Level  As Needed     Comments:  For Atrial Arrhythmias      01/19/20 2228    Unscheduled  Blood Gas, Arterial  As Needed     Comments:  Per O2 PolicyNotify Physician      01/19/20 2228    Unscheduled  Up With Assistance  As Needed      01/19/20 2228    --  SCANNED - TELEMETRY        01/19/20 0000    --  SCANNED EKG      01/19/20 0000    --  SCANNED - TELEMETRY        01/19/20 0000                Ventilator/Non-Invasive Ventilation Settings (From admission, onward)     Start     Ordered    01/19/20 2234  BIPAP  As Needed-RT     Question Answer Comment   Type: BIPAP    NIPPV Mask Interface: Full Face Mask        01/19/20 2234    01/19/20 1822  BIPAP  Until Discontinued,   Status:  Canceled     Question Answer Comment   Type: BIPAP    NIPPV Mask Interface: Full Face Mask        01/19/20 1822                   Physician Progress Notes (last 24 hours) (Notes from 01/19/20 1214 through 01/20/20 1214)      Sundeep Aldridge MD at 01/20/20 0728              [facility] Medicine Services  INPATIENT PROGRESS NOTE    Patient Name: Mini Gentile  Date of Admission: 1/19/2020  Today's Date: 01/20/20  Length of Stay: 1  Primary Care Physician: Ivan Kelly MD    Subjective   Chief Complaint: Follow-up  HPI   She is a 59-year-old woman with known end-stage renal disease on dialysis, diabetes mellitus type 2 insulin treated presenting with sudden onset of shortness of breath with hypoxia.  She was described in respiratory distress, wheezing with rales.  Her proBNP was  elevated at 30,000+ troponin slightly increased peaked at 0.069 and trending down.  Chest x-ray read as coarse interstitial markings without lung consolidation and without overt evidence of congestive heart failure.  She underwent CT angiogram of the chest that was interpreted without evidence of pulmonary embolus.  There is interstitial edema and small pleural effusion noted bilaterally.  There is a 1.1 cm left axillary lymph node.  The radiologist mentioned that her last mammogram was March 2018  White count has improved.  Potassium is 5.5.  Phosphorus 6.5.  Magnesium 2.6.  Lactic acidosis resolved at 1.2  She was started empirically on COPD treatment (Solu-Medrol, DuoNeb).  Nephrology consulted for hemodialysis.  He received IV Bumex.  The admitting diagnosis includes respiratory distress, anemia secondary to chronic kidney disease on dialysis among all others.  She also had poorly controlled hypertension.    She missed her dialysis last Saturday because she with nausea and diarrhea.  Her normal scheduled dialysis is on Tuesday, Thursday and Saturday. She said her household had likely viral illness that she picked up.  She is breathing a lot better.  Has not had any diarrhea since yesterday.  She continues to smoke 1 pack/day.  Review of Systems     All pertinent negatives and positives are as above. All other systems have been reviewed and are negative unless otherwise stated.     Objective    Temp:  [94.8 °F (34.9 °C)-98.3 °F (36.8 °C)] 98.3 °F (36.8 °C)  Heart Rate:  [] 93  Resp:  [14-26] 14  BP: (122-179)/() 153/77  Physical Exam   Constitutional: She is oriented to person, place, and time. She appears well-developed and well-nourished. No distress.   HENT:   Head: Normocephalic and atraumatic.   Right Ear: External ear normal.   Left Ear: External ear normal.   Dry oral mucosa   Eyes: Pupils are equal, round, and reactive to light. Conjunctivae and EOM are normal. Right eye exhibits no discharge.  Left eye exhibits no discharge. No scleral icterus.   Neck: Normal range of motion. Neck supple. No JVD present. No tracheal deviation present. No thyromegaly present.   Cardiovascular: Normal rate, regular rhythm and normal heart sounds.   Pulmonary/Chest: Effort normal.   Positive fine crackles   Abdominal: Soft. Bowel sounds are normal. She exhibits no distension and no mass. There is no tenderness. There is no guarding.   Musculoskeletal: Normal range of motion. She exhibits no edema.   AV fistula left arm   Neurological: She is alert and oriented to person, place, and time. No cranial nerve deficit. She exhibits normal muscle tone. Coordination normal.   Skin: Skin is warm and dry. Capillary refill takes less than 2 seconds. No rash noted. She is not diaphoretic. No erythema.   Psychiatric: She has a normal mood and affect. Her behavior is normal. Judgment and thought content normal.   Vitals reviewed.        Assessment/Plan     Active Hospital Problems    Diagnosis   • **Respiratory distress   • Anemia due to chronic kidney disease, on chronic dialysis (CMS/[facility])   • Non-compliance with renal dialysis (CMS/[facility])   • Elevated troponin due to ESRD    • Type 2 diabetes mellitus, with long-term current use of insulin (CMS/[facility])   • End stage renal failure on dialysis (CMS/[facility])   • Diabetic nephropathy (CMS/[facility])   • HTN (hypertension)       Respiratory distress is resolved  Bronchospasm resolved  Hypoxia per EMS  report  Tobacco abuse - Smoking cessation counseling  Diarrhea/nausea resolved  Elevated troponin felt secondary to CKD and possible volume overload from missing her dialysis  End-stage renal disease on dialysis  Diabetic nephropathy  Diabetes mellitus insulin treated  Hypertension  Anemia of chronic kidney disease    Plans  Patient is doing significantly better.  We will address blood pressure by resuming her medications and monitor closely  Renal service consulted for maintenance dialysis.  Continue  sliding scale insulin  Last stress echocardiogram dates back to January 2017 showed with low risk for ischemia.  Echo  I anticipate she can be switched to oral steroid tomorrow and taper  Case discussed with nurse  Remain hemodynamically stable I anticipate that she can be moved to regular floor      carvedilol 6.25 mg Oral BID With Meals   enoxaparin 30 mg Subcutaneous Q24H   insulin lispro 0-9 Units Subcutaneous 4x Daily With Meals & Nightly   ipratropium-albuterol 3 mL Nebulization Q6H - RT   methylPREDNISolone sodium succinate 40 mg Intravenous Q12H   nicotine 1 patch Transdermal Nightly   sennosides-docusate 2 tablet Oral Nightly   sodium chloride 10 mL Intravenous Q12H             Discharge Planning:  To be determined  Sundeep Aldridge MD   01/20/20   7:28 AM    Electronically signed by Sundeep Aldridge MD at 01/20/20 0907          Consult Notes (last 24 hours) (Notes from 01/19/20 1214 through 01/20/20 1214)      Gomez Lemos, APRN at 01/20/20 1016      Consult Orders    1. Inpatient Nephrology Consult [[id number]] ordered by Hang Giles MD at 01/19/20 2138                Nephrology ([facility]) Consult Note      Patient:  Mini Gentile  YOB: 1960  Date of Service: 1/20/2020  MRN: [id number]   Acct: [id number]   Primary Care Physician: Ivan Kelly MD  Advance Directive:   Code Status and Medical Interventions:   Ordered at: 01/19/20 2138     Code Status:    CPR     Medical Interventions (Level of Support Prior to Arrest):    Full     Admit Date: 1/19/2020       Hospital Day: 1  Referring Provider: Hang Giles,*      Patient personally seen and examined.  Complete chart including Consults, Notes, Operative Reports, Labs, Cardiology, and Radiology studies reviewed as able.        Subjective:  Mini Gentile is a 59 y.o. female  whom we were consulted for end stage renal disease. Maintenance  hemodialysis on Tuesday Thursday Saturday at [facility].  Missed 1/18 treatment due to not feeling well with nausea and diarrhea.  Patient previously had quit dialysis in September 2019; but resumed going to HD just over a month ago.  History of type 2 diabetes, hypertension.  Presented to ER with dyspnea and edema. Onset was sudden; not associated with chest pain or fever.  Had CT angiogram to rule out PE.  Received IV Bumex in ER with a few hundred ml's of urine output since.  Currently she is awake and alert, states her breathing is much better today.  Review of Systems:  History obtained from chart review and the patient  General ROS: Patient complains of fatigue and No fever or chills  Respiratory ROS: No cough, shortness of breath, wheezing  Cardiovascular ROS: No chest pain or palpitations  Gastrointestinal ROS: No abdominal pain or melena  Genito-Urinary ROS: No dysuria or hematuria  Neurological ROS: no head ache or dizziness  14 point ROS reviewed with the patient and negative except as noted above and in the HPI unless unable to obtain.  Intake/Output Summary (Last 24 hours) at 1/20/2020 1016  Last data filed at 1/20/2020 0400  Gross per 24 hour   Intake 50 ml   Output 350 ml   Net -300 ml     General: awake/alert    Neck :supple, no JVD  Chest:  few scattered rhonchi  CVS: regular rate and rhythm  Abdominal: soft, nontender, positive bowel sounds  Extremities: no cyanosis or edema  Skin: warm and dry without rash  Neuro: no focal motor deficits    Labs:  Results from last 7 days   Lab Units 01/20/20  0249 01/19/20  1926   WBC 10*3/mm3 8.06 15.15*   HEMOGLOBIN g/dL 9.9* 11.9*   HEMATOCRIT % 29.7* 36.8   PLATELETS 10*3/mm3 207 310         Results from last 7 days   Lab Units 01/20/20  0249 01/19/20 1956   SODIUM mmol/L 132* 131*   POTASSIUM mmol/L 5.5* 5.5*   CHLORIDE mmol/L 91* 88*   CO2 mmol/L 26.0 27.0   BUN mg/dL 38* 35*   CREATININE mg/dL 8.50* 7.96*   CALCIUM mg/dL 9.4 9.2   BILIRUBIN  mg/dL  --  0.3   ALK PHOS U/L  --  61   ALT (SGPT) U/L  --  9   AST (SGOT) U/L  --  17   GLUCOSE mg/dL 144* 227*       Radiology:   Imaging Results (Last 72 Hours)     Procedure Component Value Units Date/Time    CT Angiogram Chest With & Without Contrast [[id number]] Collected:  01/20/20 0709     Updated:  01/20/20 0719    Narrative:       CT ANGIOGRAM CHEST W WO CONTRAST- 1/19/2020 10:03 PM CST      HISTORY: sudden onset respiratory distress; R06.03-Acute respiratory  distress      COMPARISON: None.      DOSE LENGTH PRODUCT: 422 mGy cm. Automated exposure control was also  utilized to decrease patient radiation dose.     TECHNIQUE: Helical tomographic images of the chest were obtained after  the administration of intravenous contrast following angiogram protocol.  Additionally, 3D and multiplanar reformatted images were provided.        FINDINGS:    Pulmonary arteries: There is adequate enhancement of the pulmonary  arteries to evaluate for central and segmental pulmonary emboli. There  are no filling defects within the main, lobar, segmental or visualized  subsegmental pulmonary arteries. The pulmonary arteries are within  normal limits for size.      Aorta and great vessels: There is atherosclerosis in the aorta without  aneurysm or dissection. There is a stent in the LEFT subclavian vein.  Coronary artery calcifications are present.     Visualized neck base: The imaged portion of the base of the neck appears  unremarkable.      Lungs: Interlobular septal thickening is noted in both lungs. There is a  region of lung collapse in the posterior RIGHT upper lobe. Patchy  groundglass opacities are noted. Peripheral areas of subpleural  atelectasis are seen in both lower lobes. Small pleural effusions are  present bilaterally. The trachea and bronchial tree are patent.      Heart: The heart is normal in size. There is no pericardial effusion.      Mediastinum and lymph nodes: No enlarged mediastinal or hilar  lymph  nodes are present. There is a small amount of fluid in the esophagus. In  the axillary tail of the LEFT breast or RIGHT axilla, there is a 1.1 cm  lymph node. No other lymphadenopathy is noted.      Skeletal and soft tissues: The osseous structures of the thorax and  surrounding soft tissues demonstrate no acute process.     Upper abdomen: The imaged portion of the upper abdomen demonstrates no  acute process.        Impression:       1. No evidence of pulmonary embolus.  2. Interstitial edema and small pleural effusions are noted bilaterally.  3. There is a 1.1 cm LEFT axillary lymph node. Please ensure that the  patient is up-to-date with mammography. According to the patient's chart  history, her last mammogram was performed in March 2018.     A preliminary report was provided by [facility]. I  agree with the preliminary interpretation.  This report was finalized on 01/20/2020 07:16 by Dr. Feng Simpson MD.    XR Chest 1 View [[id number]] Collected:  01/19/20 1848     Updated:  01/19/20 1855    Narrative:       EXAMINATION: XR CHEST 1 VW- 1/19/2020 6:48 PM CST     HISTORY: SOB.     REPORT: A frontal view the chest is compared with the chest x-ray  12/06/2019.     There are coarse interstitial markings as before, improved aeration of  the lung bases is demonstrated. There is no focal consolidation. Heart  size is normal. No pneumothorax or pleural effusion is identified. There  is a vascular stent in the left subclavian region. This is stable.       Impression:       Coarse interstitial markings as before, without lung  consolidation. There is improvement in aeration of the lung bases. No  evidence of overt CHF is identified.  This report was finalized on 01/19/2020 18:52 by Dr. Colton Hampton MD.          Culture:  Blood Culture   Date Value Ref Range Status   01/19/2020 No growth at less than 24 hours  Preliminary   01/19/2020 No growth at less than 24 hours  Preliminary          Assessment   1.  End stage renal disease on HD TTS  2.  Type 2 diabetes with nephropathy  3.  Hyperkalemia  4.  Hyponatremia  5.  Anemia of CKD  6.  Acute respiratory distress--improved    Plan:  1.  Make-up dialysis today.  She typically takes Midodrine before HD but will hold for now as she has been hypertensive since arrival.  2.  Agree with transfer to medical floor  3.  Further assessment and plan pending Dr Gonzalez's evaluation of patient      Thank you for the consult, we appreciate the opportunity to provide care to your patients.  Feel free to contact me if I can be of any further assistance.      Gomez Lemos, ANTHONY  1/20/2020  10:16 AM    Electronically signed by Gomez Lemos, ANTHONY at 01/20/20 2639

## 2020-01-20 NOTE — CONSULTS
Nephrology ([facility]) Consult Note      Patient:  Mini Gentile  YOB: 1960  Date of Service: 1/20/2020  MRN: [id number]   Acct: [id number]   Primary Care Physician: Ivan Kelly MD  Advance Directive:   Code Status and Medical Interventions:   Ordered at: 01/19/20 2138     Code Status:    CPR     Medical Interventions (Level of Support Prior to Arrest):    Full     Admit Date: 1/19/2020       Hospital Day: 1  Referring Provider: Hang Giles,*      Patient personally seen and examined.  Complete chart including Consults, Notes, Operative Reports, Labs, Cardiology, and Radiology studies reviewed as able.        Subjective:  Mini Gentile is a 59 y.o. female  whom we were consulted for end stage renal disease. Maintenance hemodialysis on Tuesday Thursday Saturday at [facility].  Missed 1/18 treatment due to not feeling well with nausea and diarrhea.  Patient previously had quit dialysis in September 2019; but resumed going to HD just over a month ago.  History of type 2 diabetes, hypertension.  Presented to ER with dyspnea and edema. Onset was sudden; not associated with chest pain or fever.  Had CT angiogram to rule out PE.  Received IV Bumex in ER with a few hundred ml's of urine output since.  Currently she is awake and alert, states her breathing is much better today.    Allergies:  Bupropion; Chantix [varenicline]; Sulfa antibiotics; Azithromycin; Levofloxacin; and Penicillins    Home Meds:  Medications Prior to Admission   Medication Sig Dispense Refill Last Dose   • aspirin 81 MG EC tablet Take 81 mg by mouth Daily.   12/5/2019 at Unknown time   • brimonidine (ALPHAGAN) 0.2 % ophthalmic solution Administer 1 drop into the left eye 3 (Three) Times a Day.   12/5/2019 at Unknown time   • calcitriol (ROCALTROL) 0.25 MCG capsule Take 0.25 mcg by mouth Take As Directed. Only given on dialysis days (Tuesday, Thursday, Saturday)    12/5/2019 at Unknown time   • carvedilol (COREG) 6.25 MG tablet Take 1 tablet by mouth 2 (Two) Times a Day With Meals. 60 tablet 2    • cetirizine (zyrTEC) 10 MG tablet Take 10 mg by mouth At Night As Needed (allergies).   12/5/2019 at Unknown time   • cholecalciferol (VITAMIN D3) 1000 units tablet Take 2,000 Units by mouth Daily.   12/5/2019 at Unknown time   • dorzolamide (TRUSOPT) 2 % ophthalmic solution Administer 1 drop into the left eye 2 (Two) Times a Day.   12/5/2019 at Unknown time   • famotidine (PEPCID) 20 MG tablet Take 20 mg by mouth 2 (Two) Times a Day.   12/5/2019 at Unknown time   • fluticasone (FLONASE) 50 MCG/ACT nasal spray 1 spray into the nostril(s) as directed by provider 2 (Two) Times a Day.   12/5/2019 at Unknown time   • homatropine 5 % ophthalmic solution Administer 1 drop into the left eye Daily.   12/5/2019 at Unknown time   • insulin glargine (LANTUS) 100 UNIT/ML injection Inject 10 Units under the skin Every Night.   12/5/2019 at Unknown time   • lidocaine-prilocaine (EMLA) 2.5-2.5 % cream Apply 1 application topically to the appropriate area as directed Take As Directed. Apply to access area 30 minutes prior to dialysis.    12/5/2019 at Unknown time   • losartan (COZAAR) 25 MG tablet Take 1 tablet by mouth Daily. (Patient taking differently: Take 25 mg by mouth 2 (Two) Times a Day.) 30 tablet 1    • midodrine (PROAMATINE) 5 MG tablet Take 5 mg by mouth Take As Directed. as needed for low blood pressure during dialysis.   12/5/2019 at Unknown time   • Netarsudil Dimesylate (RHOPRESSA) 0.02 % solution Administer 1 drop into the left eye Every Night.   12/5/2019 at Unknown time   • pravastatin (PRAVACHOL) 10 MG tablet Take 10 mg by mouth Every Night.   12/5/2019 at Unknown time   • sevelamer (RENAGEL) 800 MG tablet Take 1,600 mg by mouth 3 (Three) Times a Day With Meals.   12/5/2019 at Unknown time   • timolol (TIMOPTIC) 0.5 % ophthalmic solution Administer 1 drop into the left eye Every  Morning.   12/5/2019 at Unknown time   • travoprost, JUDE free, (TRAVATAN Z) 0.004 % solution ophthalmic solution Administer 1 drop into the left eye Every Night.   12/5/2019 at Unknown time       Medicines:  Current Facility-Administered Medications   Medication Dose Route Frequency Provider Last Rate Last Dose   • acetaminophen (TYLENOL) tablet 650 mg  650 mg Oral Q4H PRN Hang Giles MD       • albuterol (PROVENTIL) nebulizer solution 0.083% 2.5 mg/3mL  2.5 mg Nebulization Q4H PRN Hang Giles MD       • ALPRAZolam (XANAX) tablet 0.5 mg  0.5 mg Oral Q8H PRN Hang Giles MD       • aspirin EC tablet 81 mg  81 mg Oral Daily Sundeep Aldridge MD   81 mg at 01/20/20 1013   • brimonidine (ALPHAGAN) 0.2 % ophthalmic solution 1 drop  1 drop Left Eye TID Sundeep Aldridge MD   1 drop at 01/20/20 1013   • [START ON 1/21/2020] calcitriol (ROCALTROL) capsule 0.25 mcg  0.25 mcg Oral Once per day on Tue Thu Sat Sundeep Aldridge MD       • carvedilol (COREG) tablet 6.25 mg  6.25 mg Oral BID With Meals Hang Giles MD   6.25 mg at 01/20/20 0818   • cetirizine (zyrTEC) tablet 10 mg  10 mg Oral Nightly PRN Sundeep Aldridge MD       • cholecalciferol (VITAMIN D3) tablet 2,000 Units  2,000 Units Oral Daily Sundeep Aldridge MD   2,000 Units at 01/20/20 1013   • dextrose (D50W) 25 g/ 50mL Intravenous Solution 25 g  25 g Intravenous Q15 Min PRN Hang Giles MD       • dextrose (GLUTOSE) oral gel 15 g  15 g Oral Q15 Min PRN Hang Giles MD       • dorzolamide (TRUSOPT) 2 % ophthalmic solution 1 drop  1 drop Left Eye BID Sundeep Aldridge MD   1 drop at 01/20/20 1013   • enoxaparin (LOVENOX) syringe 30 mg  30 mg Subcutaneous Q24H Hang Giles MD   30 mg at 01/20/20 0818   • famotidine (PEPCID) tablet 20 mg  20 mg Oral BID AC Sundeep Aldridge MD   20 mg at 01/20/20 1013   • fluticasone (FLONASE)  50 MCG/ACT nasal spray 1 spray  1 spray Nasal BID Sundeep Aldridge MD   1 spray at 01/20/20 1013   • glucagon (human recombinant) (GLUCAGEN DIAGNOSTIC) injection 1 mg  1 mg Subcutaneous Q15 Min PRN Hang Giles MD       • insulin detemir (LEVEMIR) injection 10 Units  10 Units Subcutaneous Nightly Sundeep Aldridge MD       • insulin lispro (humaLOG) injection 0-9 Units  0-9 Units Subcutaneous 4x Daily With Meals & Nightly Hang Giles MD   4 Units at 01/20/20 0817   • ipratropium-albuterol (DUO-NEB) nebulizer solution 3 mL  3 mL Nebulization Q6H - RT Hang Giles MD   3 mL at 01/20/20 0622   • latanoprost (XALATAN) 0.005 % ophthalmic solution 1 drop  1 drop Left Eye Nightly Sundeep Aldridge MD       • losartan (COZAAR) tablet 25 mg  25 mg Oral Daily Sundeep Aldridge MD   25 mg at 01/20/20 1013   • methylPREDNISolone sodium succinate (SOLU-Medrol) injection 40 mg  40 mg Intravenous Q12H Hang Giles MD   40 mg at 01/20/20 0818   • Morphine sulfate (PF) injection 1 mg  1 mg Intravenous Q4H PRN Hang Giles MD        And   • naloxone (NARCAN) injection 0.4 mg  0.4 mg Intravenous Q5 Min PRN Hang Giles MD       • nicotine (NICODERM CQ) 21 MG/24HR patch 1 patch  1 patch Transdermal Nightly Hang Giles MD   1 patch at 01/19/20 2316   • nitroglycerin (NITROSTAT) SL tablet 0.4 mg  0.4 mg Sublingual Q5 Min PRN Hang Giles MD       • ondansetron (ZOFRAN) injection 4 mg  4 mg Intravenous Q6H PRN Hang Giles MD       • pravastatin (PRAVACHOL) tablet 10 mg  10 mg Oral Nightly Sundeep Aldridge MD       • sennosides-docusate (PERICOLACE) 8.6-50 MG per tablet 2 tablet  2 tablet Oral Nightly Hang Giles MD       • sodium chloride 0.9 % flush 10 mL  10 mL Intravenous PRN Hang Giles MD       • sodium chloride 0.9 % flush 10 mL  10 mL Intravenous Q12H  Hang Giles MD   10 mL at 20 0823   • sodium chloride 0.9 % flush 10 mL  10 mL Intravenous PRN Hang Giles MD       • timolol (TIMOPTIC) 0.5 % ophthalmic solution 1 drop  1 drop Left Eye Sundeep Madison MD   1 drop at 20 1013       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    • Chronic otitis media    • Diabetes (CMS/HCC)    • Eustachian tube dysfunction    • Glaucoma    • HTN (hypertension)    • Kidney failure     on dialysis   • Liver disorder    • Mucoid otitis media        Past Surgical History:  Past Surgical History:   Procedure Laterality Date   • ARTERIOVENOUS FISTULA     • CATARACT EXTRACTION WITH INTRAOCULAR LENS IMPLANT     •  SECTION     • CHOLECYSTECTOMY     • MYRINGOTOMY W/ TUBES Bilateral    • MYRINGOTOMY W/ TUBES Right    • TUBAL ABDOMINAL LIGATION         Family History  Family History   Problem Relation Age of Onset   • Heart disease Mother        Social History  Social History     Socioeconomic History   • Marital status:      Spouse name: Not on file   • Number of children: Not on file   • Years of education: Not on file   • Highest education level: Not on file   Tobacco Use   • Smoking status: Current Every Day Smoker     Packs/day: 0.50     Years: 6.00     Pack years: 3.00     Types: Cigarettes   • Smokeless tobacco: Never Used   Substance and Sexual Activity   • Alcohol use: No   • Drug use: No   • Sexual activity: Defer         Review of Systems:  History obtained from chart review and the patient  General ROS: Patient complains of fatigue and No fever or chills  Respiratory ROS: No cough, shortness of breath, wheezing  Cardiovascular ROS: No chest pain or palpitations  Gastrointestinal ROS: No abdominal pain or melena  Genito-Urinary ROS: No dysuria or hematuria  Neurological ROS: no head ache or dizziness  14 point ROS reviewed with the patient and  negative except as noted above and in the HPI unless unable to obtain.    Objective:  Patient Vitals for the past 24 hrs:   BP Temp Temp src Pulse Resp SpO2 Height Weight   01/20/20 0930 134/48 -- -- 92 -- 94 % -- --   01/20/20 0900 166/72 -- -- 96 -- 97 % -- --   01/20/20 0830 175/77 -- -- 96 -- 100 % -- --   01/20/20 0800 172/74 98.2 °F (36.8 °C) -- 97 -- (!) 77 % -- --   01/20/20 0730 161/80 -- -- 93 -- 97 % -- --   01/20/20 0700 136/80 -- -- 92 -- -- -- --   01/20/20 0631 -- -- -- 93 -- -- -- --   01/20/20 0630 153/77 -- -- 92 -- -- -- --   01/20/20 0622 -- -- -- 94 14 96 % -- --   01/20/20 0600 167/80 -- -- 94 -- 99 % -- --   01/20/20 0530 133/56 -- -- 84 -- 100 % -- --   01/20/20 0515 137/61 -- -- 84 -- 100 % -- --   01/20/20 0500 132/58 -- -- 85 15 100 % -- --   01/20/20 0445 130/61 -- -- 85 -- 100 % -- --   01/20/20 0430 129/64 -- -- 85 -- 99 % -- --   01/20/20 0425 -- -- -- -- -- -- -- 60.3 kg (132 lb 15 oz)   01/20/20 0415 146/62 -- -- 89 -- 100 % -- --   01/20/20 0400 125/53 -- -- 85 16 99 % -- --   01/20/20 0350 -- 98.3 °F (36.8 °C) Oral -- -- -- -- --   01/20/20 0345 127/54 -- -- 86 -- 100 % -- --   01/20/20 0330 122/50 -- -- 84 -- 100 % -- --   01/20/20 0315 124/60 -- -- 85 -- 99 % -- --   01/20/20 0300 135/55 -- -- 87 17 100 % -- --   01/20/20 0245 148/66 -- -- 88 -- 98 % -- --   01/20/20 0230 162/77 -- -- 90 -- 99 % -- --   01/20/20 0215 162/83 -- -- 91 -- 97 % -- --   01/20/20 0200 141/75 -- -- 82 14 97 % -- --   01/20/20 0145 143/66 -- -- 84 -- 96 % -- --   01/20/20 0130 129/66 -- -- 83 -- 97 % -- --   01/20/20 0115 138/67 -- -- 85 -- 100 % -- --   01/20/20 0100 146/66 -- -- 88 20 100 % -- --   01/20/20 0045 147/58 -- -- 90 -- 95 % -- --   01/20/20 0030 146/66 -- -- 90 -- 95 % -- --   01/20/20 0024 -- -- -- 97 20 94 % -- --   01/20/20 0020 -- -- -- 92 20 95 % -- --   01/20/20 0015 146/60 -- -- 91 -- 95 % -- --   01/20/20 0000 163/69 -- -- 95 18 95 % -- --   01/19/20 2345 175/75 -- -- 100 -- 93 % --  "--   01/19/20 2316 171/96 -- -- 102 -- -- -- --   01/19/20 2300 171/96 -- -- 101 24 93 % -- --   01/19/20 2245 179/98 -- -- 95 -- 94 % -- --   01/19/20 2230 -- 98 °F (36.7 °C) Axillary -- -- -- -- --   01/19/20 2229 161/80 -- -- 96 -- 97 % -- 60.2 kg (132 lb 12.8 oz)   01/19/20 2223 -- -- -- -- -- -- 147.3 cm (58\") --   01/19/20 2205 -- 97.6 °F (36.4 °C) Axillary -- -- -- -- --   01/19/20 2200 168/91 -- -- 91 -- 97 % -- --   01/19/20 2155 -- -- -- 89 17 99 % -- --   01/19/20 2045 176/78 -- -- 89 20 99 % -- --   01/19/20 2030 164/100 -- -- 88 22 97 % -- --   01/19/20 2000 -- -- -- 93 20 97 % -- --   01/19/20 1930 -- -- -- 93 -- 98 % -- --   01/19/20 1915 173/100 -- -- 94 22 97 % -- --   01/19/20 1825 178/87 94.8 °F (34.9 °C) Axillary 100 25 96 % -- --   01/19/20 1815 -- -- -- 103 26 90 % -- --   01/19/20 1812 -- -- -- -- -- -- 149.9 cm (59\") 54.4 kg (120 lb)       Intake/Output Summary (Last 24 hours) at 1/20/2020 1016  Last data filed at 1/20/2020 0400  Gross per 24 hour   Intake 50 ml   Output 350 ml   Net -300 ml     General: awake/alert    Neck :supple, no JVD  Chest:  few scattered rhonchi  CVS: regular rate and rhythm  Abdominal: soft, nontender, positive bowel sounds  Extremities: no cyanosis or edema  Skin: warm and dry without rash  Neuro: no focal motor deficits    Labs:  Results from last 7 days   Lab Units 01/20/20  0249 01/19/20  1926   WBC 10*3/mm3 8.06 15.15*   HEMOGLOBIN g/dL 9.9* 11.9*   HEMATOCRIT % 29.7* 36.8   PLATELETS 10*3/mm3 207 310         Results from last 7 days   Lab Units 01/20/20  0249 01/19/20 1956   SODIUM mmol/L 132* 131*   POTASSIUM mmol/L 5.5* 5.5*   CHLORIDE mmol/L 91* 88*   CO2 mmol/L 26.0 27.0   BUN mg/dL 38* 35*   CREATININE mg/dL 8.50* 7.96*   CALCIUM mg/dL 9.4 9.2   BILIRUBIN mg/dL  --  0.3   ALK PHOS U/L  --  61   ALT (SGPT) U/L  --  9   AST (SGOT) U/L  --  17   GLUCOSE mg/dL 144* 227*       Radiology:   Imaging Results (Last 72 Hours)     Procedure Component Value Units " Date/Time    CT Angiogram Chest With & Without Contrast [[id number]] Collected:  01/20/20 0709     Updated:  01/20/20 0719    Narrative:       CT ANGIOGRAM CHEST W WO CONTRAST- 1/19/2020 10:03 PM CST      HISTORY: sudden onset respiratory distress; R06.03-Acute respiratory  distress      COMPARISON: None.      DOSE LENGTH PRODUCT: 422 mGy cm. Automated exposure control was also  utilized to decrease patient radiation dose.     TECHNIQUE: Helical tomographic images of the chest were obtained after  the administration of intravenous contrast following angiogram protocol.  Additionally, 3D and multiplanar reformatted images were provided.        FINDINGS:    Pulmonary arteries: There is adequate enhancement of the pulmonary  arteries to evaluate for central and segmental pulmonary emboli. There  are no filling defects within the main, lobar, segmental or visualized  subsegmental pulmonary arteries. The pulmonary arteries are within  normal limits for size.      Aorta and great vessels: There is atherosclerosis in the aorta without  aneurysm or dissection. There is a stent in the LEFT subclavian vein.  Coronary artery calcifications are present.     Visualized neck base: The imaged portion of the base of the neck appears  unremarkable.      Lungs: Interlobular septal thickening is noted in both lungs. There is a  region of lung collapse in the posterior RIGHT upper lobe. Patchy  groundglass opacities are noted. Peripheral areas of subpleural  atelectasis are seen in both lower lobes. Small pleural effusions are  present bilaterally. The trachea and bronchial tree are patent.      Heart: The heart is normal in size. There is no pericardial effusion.      Mediastinum and lymph nodes: No enlarged mediastinal or hilar lymph  nodes are present. There is a small amount of fluid in the esophagus. In  the axillary tail of the LEFT breast or RIGHT axilla, there is a 1.1 cm  lymph node. No other lymphadenopathy is noted.       Skeletal and soft tissues: The osseous structures of the thorax and  surrounding soft tissues demonstrate no acute process.     Upper abdomen: The imaged portion of the upper abdomen demonstrates no  acute process.        Impression:       1. No evidence of pulmonary embolus.  2. Interstitial edema and small pleural effusions are noted bilaterally.  3. There is a 1.1 cm LEFT axillary lymph node. Please ensure that the  patient is up-to-date with mammography. According to the patient's chart  history, her last mammogram was performed in March 2018.     A preliminary report was provided by [facility]. I  agree with the preliminary interpretation.  This report was finalized on 01/20/2020 07:16 by Dr. Feng Simpson MD.    XR Chest 1 View [[id number]] Collected:  01/19/20 1848     Updated:  01/19/20 1855    Narrative:       EXAMINATION: XR CHEST 1 VW- 1/19/2020 6:48 PM CST     HISTORY: SOB.     REPORT: A frontal view the chest is compared with the chest x-ray  12/06/2019.     There are coarse interstitial markings as before, improved aeration of  the lung bases is demonstrated. There is no focal consolidation. Heart  size is normal. No pneumothorax or pleural effusion is identified. There  is a vascular stent in the left subclavian region. This is stable.       Impression:       Coarse interstitial markings as before, without lung  consolidation. There is improvement in aeration of the lung bases. No  evidence of overt CHF is identified.  This report was finalized on 01/19/2020 18:52 by Dr. Colton Hampton MD.          Culture:  Blood Culture   Date Value Ref Range Status   01/19/2020 No growth at less than 24 hours  Preliminary   01/19/2020 No growth at less than 24 hours  Preliminary         Assessment   1.  End stage renal disease on HD TTS  2.  Type 2 diabetes with nephropathy  3.  Hyperkalemia  4.  Hyponatremia  5.  Anemia of CKD  6.  Acute respiratory distress--improved    Plan:  1.  Make-up  dialysis today.  She typically takes Midodrine before HD but will hold for now as she has been hypertensive since arrival.  2.  Agree with transfer to medical floor  3.  Further assessment and plan pending Dr Gonzalez's evaluation of patient      Thank you for the consult, we appreciate the opportunity to provide care to your patients.  Feel free to contact me if I can be of any further assistance.      Gomez Lemos, APRN  1/20/2020  10:16 AM

## 2020-01-20 NOTE — PROGRESS NOTES
Continued Stay Note   Db     Patient Name: Mini Gentile  MRN: [id number]  Today's Date: 1/20/2020    Admit Date: 1/19/2020    Discharge Plan     Row Name 01/20/20 1423       Plan    Plan Comments  Pt in HD at this time. SW will complete assessment at a later time when SW can speak with pt.         Discharge Codes    No documentation.             Cindy Alberts

## 2020-01-20 NOTE — NURSING NOTE
IV removed per patient while sleeping. Attempted to get another IV and it blew. Patient has been stuck several times since arrival in ER. Will place VAT consult.

## 2020-01-20 NOTE — PLAN OF CARE
BP slightly elevated this evening, patient took midodrine prior to arrival. Improved after coreg and bumex. Minimal output after bumex, 350ml. No complaints of pain. CHG bath given. Patient is turning self in bed. Tolerating 3L nasal cannula, bipap not required since admission to unit. Will continue to monitor.

## 2020-01-20 NOTE — H&P
[facility] Services  HISTORY AND PHYSICAL    Date of Admission: 2020  Primary Care Physician: Ivan Kelly MD    Subjective     Chief Complaint: Shortness of breath    History of Present Illness  59 year old female with PMH of ESRD, DM 2 ID, Tobacco abuse that presents with sudden onset of shortness of breath.  states she decompensated suddenly and started gasping for air. EMS found her hypoxic. She is currently on BiPAP in the ER and feeling slightly better. Denies chest pain, fevers or sputum production. Does not complaint of leg pain. Has edema and missed HD. Has history of non compliance.     Review of Systems   Otherwise complete ROS reviewed and negative except as mentioned in the HPI.    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 She has a 3.00 pack-year smoking history. She has never used smokeless tobacco. She reports that she does not drink alcohol or use drugs.    Family History: family history includes Heart disease in her mother.       Allergies:  Allergies   Allergen Reactions   • Bupropion Nausea Only   • Chantix [Varenicline]    • Sulfa Antibiotics    • Azithromycin Rash   • Levofloxacin Rash   • Penicillins Rash     Medications:  Prior to Admission medications    Medication Sig Start Date End  Date Taking? Authorizing Provider   aspirin 81 MG EC tablet Take 81 mg by mouth Daily.    Steve Warren MD   brimonidine (ALPHAGAN) 0.2 % ophthalmic solution Administer 1 drop into the left eye 3 (Three) Times a Day.    Steve Warren MD   calcitriol (ROCALTROL) 0.25 MCG capsule Take 0.25 mcg by mouth Take As Directed. Only given on dialysis days (Tuesday, Thursday, Saturday)    Steve Warren MD   carvedilol (COREG) 6.25 MG tablet Take 1 tablet by mouth 2 (Two) Times a Day With Meals. 12/11/19   Jaswant Villegas MD   cetirizine (zyrTEC) 10 MG tablet Take 10 mg by mouth At Night As Needed (allergies).    Steve Warren MD   cholecalciferol (VITAMIN D3) 1000 units tablet Take 1,000 Units by mouth Daily.    Steve Warren MD   dorzolamide (TRUSOPT) 2 % ophthalmic solution Administer 1 drop into the left eye 2 (Two) Times a Day.    Steve Warren MD   famotidine (PEPCID) 20 MG tablet Take 20 mg by mouth 2 (Two) Times a Day.    Stvee Warren MD   fluticasone (FLONASE) 50 MCG/ACT nasal spray 1 spray into the nostril(s) as directed by provider 2 (Two) Times a Day.    Steve Warren MD   homatropine 5 % ophthalmic solution Administer 1 drop into the left eye Daily.    Steve Warren MD   insulin glargine (LANTUS) 100 UNIT/ML injection Inject 10 Units under the skin Every Night.    Steve Warren MD   lidocaine-prilocaine (EMLA) 2.5-2.5 % cream Apply 1 application topically to the appropriate area as directed Take As Directed. Apply to access area 30 minutes prior to dialysis.     Steve Warren MD   losartan (COZAAR) 25 MG tablet Take 1 tablet by mouth Daily. 12/11/19   Jaswant Villegas MD   midodrine (PROAMATINE) 5 MG tablet Take 5 mg by mouth Take As Directed. as needed for low blood pressure during dialysis.    Steve Warren MD   Netarsudil Dimesylate (RHOPRESSA) 0.02 % solution Administer 1 drop into the left eye Every  "Night.    Steve Warren MD   pravastatin (PRAVACHOL) 10 MG tablet Take 10 mg by mouth Every Night.    Steve Warren MD   sevelamer (RENAGEL) 800 MG tablet Take 1,600 mg by mouth 3 (Three) Times a Day With Meals.    Steve Warren MD   timolol (TIMOPTIC) 0.5 % ophthalmic solution Administer 1 drop into the left eye Every Morning.    Steve Warren MD   travoprost, JDUE free, (TRAVATAN Z) 0.004 % solution ophthalmic solution Administer 1 drop into the left eye Every Night.    Steve Warren MD     Objective     Vital Signs: /78   Pulse 89   Temp 94.8 °F (34.9 °C) (Axillary)   Resp 20   Ht 149.9 cm (59\")   Wt 54.4 kg (120 lb)   SpO2 99%   BMI 24.24 kg/m²   Physical Exam   Constitutional: She is oriented to person, place, and time. She appears well-developed and well-nourished.   HENT:   Head: Normocephalic and atraumatic.   Right Ear: External ear normal.   Left Ear: External ear normal.   Eyes: Pupils are equal, round, and reactive to light. EOM are normal. Right eye exhibits no discharge. Left eye exhibits no discharge.   Neck: Normal range of motion. Neck supple. No JVD present. No tracheal deviation present. No thyromegaly present.   Cardiovascular: Normal rate, regular rhythm and normal heart sounds.   No murmur heard.  AV fistula Left arm with good bruit   Pulmonary/Chest: No stridor. She is in respiratory distress. She has wheezes. She has rales. She exhibits no tenderness.   Abdominal: Soft. Bowel sounds are normal. She exhibits no distension and no mass. There is no tenderness. There is no guarding.   Musculoskeletal: Normal range of motion. She exhibits edema. She exhibits no tenderness or deformity.   Neurological: She is alert and oriented to person, place, and time. She displays normal reflexes. No cranial nerve deficit. She exhibits normal muscle tone. Coordination normal.   Skin: Skin is warm. Capillary refill takes less than 2 seconds. She is not " diaphoretic. No erythema.     Results Reviewed:  Lab Results (last 24 hours)     Procedure Component Value Units Date/Time    BNP [[id number]]  (Abnormal) Collected:  01/19/20 1956    Specimen:  Blood Updated:  01/19/20 2054     proBNP 30,479.0 pg/mL     Narrative:       Among patients with dyspnea, NT-proBNP is highly sensitive for the detection of acute congestive heart failure. In addition NT-proBNP of <300 pg/ml effectively rules out acute congestive heart failure with 99% negative predictive value.    Results may be falsely decreased if patient taking Biotin.      Troponin [[id number]]  (Abnormal) Collected:  01/19/20 1956    Specimen:  Blood Updated:  01/19/20 2043     Troponin T 0.031 ng/mL     Narrative:       Troponin T Reference Range:  <= 0.03 ng/mL-   Negative for AMI  >0.03 ng/mL-     Abnormal for myocardial necrosis.  Clinicians would have to utilize clinical acumen, EKG, Troponin and serial changes to determine if it is an Acute Myocardial Infarction or myocardial injury due to an underlying chronic condition.       Results may be falsely decreased if patient taking Biotin.      Comprehensive Metabolic Panel [[id number]]  (Abnormal) Collected:  01/19/20 1956    Specimen:  Blood Updated:  01/19/20 2042     Glucose 227 mg/dL      BUN 35 mg/dL      Creatinine 7.96 mg/dL      Sodium 131 mmol/L      Potassium 5.5 mmol/L      Chloride 88 mmol/L      CO2 27.0 mmol/L      Calcium 9.2 mg/dL      Total Protein 7.1 g/dL      Albumin 4.40 g/dL      ALT (SGPT) 9 U/L      AST (SGOT) 17 U/L      Alkaline Phosphatase 61 U/L      Total Bilirubin 0.3 mg/dL      eGFR Non African Amer 5 mL/min/1.73      Comment: <15 Indicative of kidney failure.        eGFR   Amer --     Comment: <15 Indicative of kidney failure.        Globulin 2.7 gm/dL      A/G Ratio 1.6 g/dL      BUN/Creatinine Ratio 4.4     Anion Gap 16.0 mmol/L     Narrative:       GFR Normal >60  Chronic Kidney Disease <60  Kidney Failure <15      Magnesium  [[id number]]  (Normal) Collected:  01/19/20 1956    Specimen:  Blood Updated:  01/19/20 2042     Magnesium 2.4 mg/dL     D-dimer, Quantitative [[id number]]  (Abnormal) Collected:  01/19/20 1956    Specimen:  Blood Updated:  01/19/20 2038     D-Dimer, Quantitative 2.31 mg/L (FEU)     Narrative:       Reference Range is 0-0.50 mg/L FEU. However, results <0.50 mg/L FEU tends to rule out DVT or PE. Results >0.50 mg/L FEU are not useful in predicting absence or presence of DVT or PE.      Blood Gas, Venous [[id number]] Collected:  01/19/20 1956    Specimen:  Venous Blood Updated:  01/19/20 2030    Blood Culture - Blood, Arm, Right [[id number]] Collected:  01/19/20 1956    Specimen:  Blood from Arm, Right Updated:  01/19/20 2030    Blood Gas, Venous [[id number]]  (Abnormal) Collected:  01/19/20 1955    Specimen:  Venous Blood Updated:  01/19/20 2000     Site OTHER     pH, Venous 7.322 pH Units      pCO2, Venous 54.4 mm Hg      Comment: 83 Value above reference range        pO2, Venous 21.9 mm Hg      Comment: 84 Value below reference range        HCO3, Venous 28.1 mmol/L      Comment: 83 Value above reference range        Base Excess, Venous 1.3 mmol/L      O2 Saturation, Venous 33.9 %      Comment: 84 Value below reference range        Temperature 37.0 C      Barometric Pressure for Blood Gas 763 mmHg      Modality BiPap     FIO2 40 %      Ventilator Mode NIV     Set [facility] Resp Rate 12.0     IPAP 16     Comment: Meter: [id number]     :  [id number]        EPAP 6     Collected by [id number]    CBC & Differential [[id number]] Collected:  01/19/20 1926    Specimen:  Blood Updated:  01/19/20 1933    Narrative:       The following orders were created for panel order CBC & Differential.  Procedure                               Abnormality         Status                     ---------                               -----------         ------                     CBC Auto Differential[[id number]]        Abnormal            Final  result                 Please view results for these tests on the individual orders.    CBC Auto Differential [[id number]]  (Abnormal) Collected:  01/19/20 1926    Specimen:  Blood Updated:  01/19/20 1933     WBC 15.15 10*3/mm3      RBC 3.78 10*6/mm3      Hemoglobin 11.9 g/dL      Hematocrit 36.8 %      MCV 97.4 fL      MCH 31.5 pg      MCHC 32.3 g/dL      RDW 15.9 %      RDW-SD 55.8 fl      MPV 10.2 fL      Platelets 310 10*3/mm3      Neutrophil % 50.2 %      Lymphocyte % 35.4 %      Monocyte % 3.2 %      Eosinophil % 10.0 %      Basophil % 0.7 %      Immature Grans % 0.5 %      Neutrophils, Absolute 7.61 10*3/mm3      Lymphocytes, Absolute 5.37 10*3/mm3      Monocytes, Absolute 0.48 10*3/mm3      Eosinophils, Absolute 1.52 10*3/mm3      Basophils, Absolute 0.10 10*3/mm3      Immature Grans, Absolute 0.07 10*3/mm3      nRBC 0.1 /100 WBC     [place] Draw [[id number]] Collected:  01/19/20 1820    Specimen:  Blood Updated:  01/19/20 1930    Narrative:       The following orders were created for panel order [place] Draw.  Procedure                               Abnormality         Status                     ---------                               -----------         ------                     Light Blue Top[[id number]]                                   Final result               Green Top (Gel)[[id number]]                                  Final result               Lavender Top[[id number]]                                     Final result               Red Top[[id number]]                                          Final result                 Please view results for these tests on the individual orders.    Lavender Top [[id number]] Collected:  01/19/20 1820    Specimen:  Blood Updated:  01/19/20 1930     Extra Tube hold for add-on     Comment: Auto resulted       Light Blue Top [[id number]] Collected:  01/19/20 1821    Specimen:  Blood Updated:  01/19/20 1930     Extra Tube hold for add-on     Comment: Auto resulted       Green  Top (Gel) [[id number]] Collected:  01/19/20 1820    Specimen:  Blood Updated:  01/19/20 1930     Extra Tube Hold for add-ons.     Comment: Auto resulted.       Red Top [[id number]] Collected:  01/19/20 1820    Specimen:  Blood Updated:  01/19/20 1930     Extra Tube Hold for add-ons.     Comment: Auto resulted.       Lactic Acid, Plasma [[id number]]  (Abnormal) Collected:  01/19/20 1825    Specimen:  Blood Updated:  01/19/20 1859     Lactate 2.6 mmol/L     Lactic Acid, Reflex Timer (This will reflex a repeat order 3-3:15 hours after ordered.) [[id number]] Collected:  01/19/20 1825    Specimen:  Blood Updated:  01/19/20 1859    Blood Culture - Blood, Arm, Right [[id number]] Collected:  01/19/20 1825    Specimen:  Blood from Arm, Right Updated:  01/19/20 1846        Imaging Results (Last 24 Hours)     Procedure Component Value Units Date/Time    XR Chest 1 View [[id number]] Collected:  01/19/20 1848     Updated:  01/19/20 1855    Narrative:       EXAMINATION: XR CHEST 1 VW- 1/19/2020 6:48 PM CST     HISTORY: SOB.     REPORT: A frontal view the chest is compared with the chest x-ray  12/06/2019.     There are coarse interstitial markings as before, improved aeration of  the lung bases is demonstrated. There is no focal consolidation. Heart  size is normal. No pneumothorax or pleural effusion is identified. There  is a vascular stent in the left subclavian region. This is stable.       Impression:       Coarse interstitial markings as before, without lung  consolidation. There is improvement in aeration of the lung bases. No  evidence of overt CHF is identified.  This report was finalized on 01/19/2020 18:52 by Dr. Colton Hampton MD.        Echocardiogram 12/08/19  · Left ventricular wall thickness is consistent with mild concentric hypertrophy.  · Left ventricular systolic function is normal. Estimated ejection fraction is 66 to 70%.  · Left ventricular diastolic dysfunction (grade I) consistent with impaired  relaxation.  · Normal right ventricular cavity size and systolic function noted.  · There is no significant (greater than mild) valvular dysfunction.      I have personally reviewed and interpreted the radiology studies and ECG obtained at time of admission.     Assessment / Plan     Assessment:   Active Hospital Problems    Diagnosis   • **Respiratory distress   • Anemia due to chronic kidney disease, on chronic dialysis (CMS/[facility])   • Non-compliance with renal dialysis (CMS/[facility])   • Elevated troponin due to ESRD    • Type 2 diabetes mellitus, with long-term current use of insulin (CMS/[facility])   • End stage renal failure on dialysis (CMS/[facility])   • Diabetic nephropathy (CMS/[facility])   • HTN (hypertension)     Plan:   Admit to critical care.   CTA chest rule out PE due to sudden onset of symptoms.   Empiric COPD treatment with Solumedrol/Duoneb/Albuterol.  Nephrology for HD in AM. Bumex IV for now.  Reconcile home medications  DM > Humalog sliding scale  DVT prophylaxis for now Lovenox   Follow troponin trend  Smoking cessation advised    Code Status: Full     I discussed the patient's findings and my recommendations with the patient and     Estimated length of stay over 2 midnights    Hang Giles MD   01/19/20   9:14 PM

## 2020-01-20 NOTE — ED PROVIDER NOTES
Subjective   Patient is brought to emergency room by ambulance with a complaint from her significant other that she became suddenly weak and feeling bad about an hour prior to arrival.  She also developed a severe shortness of breath.  He checked her blood pressure and found it to be 110/40 so she took 1 of her pills that she normally takes before dialysis so she does not have a drop in her blood pressure.  He says when EMS got there it was 200/100.  EMS tells us that her pulse ox was in the 70% range so they put her on CPAP to bring her in.  She does smoke.  Her significant other says that she missed her dialysis yesterday.  However he says she was feeling fine until about an hour prior to arrival here.      History provided by:  Significant other  History limited by:  Acuity of condition   used: No    Shortness of Breath   Severity:  Severe  Onset quality:  Sudden  Duration:  1 hour  Timing:  Constant  Progression:  Unchanged  Chronicity:  New  Context: not activity, not animal exposure, not emotional upset, not fumes, not known allergens, not occupational exposure, not pollens, not smoke exposure, not strong odors, not URI and not weather changes    Relieved by:  Nothing  Worsened by:  Nothing  Ineffective treatments:  None tried  Associated symptoms: cough    Associated symptoms: no abdominal pain, no chest pain, no claudication, no diaphoresis, no ear pain, no fever, no headaches, no hemoptysis, no neck pain, no PND, no rash, no sore throat, no sputum production, no syncope, no swollen glands, no vomiting and no wheezing    Risk factors: tobacco use    Risk factors: no recent alcohol use, no family hx of DVT, no hx of cancer, no hx of PE/DVT, no obesity, no oral contraceptive use, no prolonged immobilization and no recent surgery        Review of Systems   Constitutional: Negative.  Negative for diaphoresis and fever.   HENT: Negative.  Negative for ear pain and sore throat.    Respiratory:  Positive for cough and shortness of breath. Negative for hemoptysis, sputum production and wheezing.    Cardiovascular: Negative.  Negative for chest pain, claudication, syncope and PND.   Gastrointestinal: Negative.  Negative for abdominal pain and vomiting.   Genitourinary: Negative.    Musculoskeletal: Negative.  Negative for neck pain.   Skin: Negative.  Negative for rash.   Neurological: Negative.  Negative for headaches.   Hematological: Negative.    Psychiatric/Behavioral: Negative.    All other systems reviewed and are negative.      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    • Chronic otitis media    • Diabetes (CMS/HCC)    • Eustachian tube dysfunction    • Glaucoma    • HTN (hypertension)    • Kidney failure     on dialysis   • Liver disorder    • Mucoid otitis media        Allergies   Allergen Reactions   • Bupropion Nausea Only   • Chantix [Varenicline]    • Sulfa Antibiotics    • Azithromycin Rash   • Levofloxacin Rash   • Penicillins Rash       Past Surgical History:   Procedure Laterality Date   • ARTERIOVENOUS FISTULA     • CATARACT EXTRACTION WITH INTRAOCULAR LENS IMPLANT     •  SECTION     • CHOLECYSTECTOMY     • MYRINGOTOMY W/ TUBES Bilateral    • MYRINGOTOMY W/ TUBES Right    • TUBAL ABDOMINAL LIGATION         Family History   Problem Relation Age of Onset   • Heart disease Mother        Social History     Socioeconomic History   • Marital status:      Spouse name: Not on file   • Number of children: Not on file   • Years of education: Not on file   • Highest education level: Not on file   Tobacco Use   • Smoking status: Current Every Day Smoker     Packs/day: 0.50     Years: 6.00     Pack years: 3.00     Types: Cigarettes   • Smokeless tobacco: Never Used   Substance and Sexual Activity   • Alcohol use: No   • Drug use: No   • Sexual activity: Defer       Prior to Admission medications    Medication Sig Start Date End Date  Taking? Authorizing Provider   aspirin 81 MG EC tablet Take 81 mg by mouth Daily.    Steve Warren MD   brimonidine (ALPHAGAN) 0.2 % ophthalmic solution Administer 1 drop into the left eye 3 (Three) Times a Day.    Steve Warren MD   calcitriol (ROCALTROL) 0.25 MCG capsule Take 0.25 mcg by mouth Take As Directed. Only given on dialysis days (Tuesday, Thursday, Saturday)    Steve Warren MD   carvedilol (COREG) 6.25 MG tablet Take 1 tablet by mouth 2 (Two) Times a Day With Meals. 12/11/19   Jaswant Villegas MD   cetirizine (zyrTEC) 10 MG tablet Take 10 mg by mouth At Night As Needed (allergies).    Steve Warren MD   cholecalciferol (VITAMIN D3) 1000 units tablet Take 1,000 Units by mouth Daily.    Steve Warren MD   dorzolamide (TRUSOPT) 2 % ophthalmic solution Administer 1 drop into the left eye 2 (Two) Times a Day.    Steve Warren MD   famotidine (PEPCID) 20 MG tablet Take 20 mg by mouth 2 (Two) Times a Day.    Steve Warren MD   fluticasone (FLONASE) 50 MCG/ACT nasal spray 1 spray into the nostril(s) as directed by provider 2 (Two) Times a Day.    Steve Warren MD   homatropine 5 % ophthalmic solution Administer 1 drop into the left eye Daily.    Steve Warren MD   insulin glargine (LANTUS) 100 UNIT/ML injection Inject 10 Units under the skin Every Night.    Steve Warren MD   lidocaine-prilocaine (EMLA) 2.5-2.5 % cream Apply 1 application topically to the appropriate area as directed Take As Directed. Apply to access area 30 minutes prior to dialysis.     Steve Warren MD   losartan (COZAAR) 25 MG tablet Take 1 tablet by mouth Daily. 12/11/19   Jaswant Villegas MD   midodrine (PROAMATINE) 5 MG tablet Take 5 mg by mouth Take As Directed. as needed for low blood pressure during dialysis.    Steve Warren MD   Netarsudil Dimesylate (RHOPRESSA) 0.02 % solution Administer 1 drop into the left eye Every Night.     ProviderSteve MD   pravastatin (PRAVACHOL) 10 MG tablet Take 10 mg by mouth Every Night.    Steve Warren MD   sevelamer (RENAGEL) 800 MG tablet Take 1,600 mg by mouth 3 (Three) Times a Day With Meals.    Steve Warren MD   timolol (TIMOPTIC) 0.5 % ophthalmic solution Administer 1 drop into the left eye Every Morning.    Steve Wraren MD   travoprost, JUDE free, (TRAVATAN Z) 0.004 % solution ophthalmic solution Administer 1 drop into the left eye Every Night.    Steve Warren MD       Medications   sodium chloride 0.9 % flush 10 mL (has no administration in time range)   methylPREDNISolone sodium succinate (SOLU-Medrol) injection 125 mg (has no administration in time range)       Vitals:    01/19/20 2045   BP: 176/78   Pulse: 89   Resp: 20   Temp:    SpO2: 99%         Objective   Physical Exam   Constitutional: She appears well-developed and well-nourished. She appears distressed.   HENT:   Head: Normocephalic and atraumatic.   Neck: Normal range of motion. Neck supple.   Cardiovascular: Normal rate and regular rhythm.   Pulmonary/Chest: Tachypnea noted. She is in respiratory distress. She has wheezes in the right middle field and the left middle field. She has rales in the right middle field and the left middle field.   Abdominal: Soft. Bowel sounds are normal.   Musculoskeletal: Normal range of motion.   Neurological:   lethargic   Skin: Skin is warm.   clammy   Psychiatric: She has a normal mood and affect. Her behavior is normal.   Nursing note and vitals reviewed.      Critical Care  Performed by: Angel Rodriguez Jr., MD  Authorized by: Angel Rodriguez Jr., MD     Critical care provider statement:     Critical care time (minutes):  30    Critical care start time:  1/19/2020 6:15 PM    Critical care end time:  1/19/2020 6:45 PM    Critical care time was exclusive of:  Separately billable procedures and treating other patients    Critical care was necessary to treat or  prevent imminent or life-threatening deterioration of the following conditions:  Respiratory failure    Critical care was time spent personally by me on the following activities:  Blood draw for specimens, development of treatment plan with patient or surrogate, discussions with primary provider, evaluation of patient's response to treatment, examination of patient, interpretation of cardiac output measurements, obtaining history from patient or surrogate, ordering and performing treatments and interventions, ordering and review of laboratory studies, ordering and review of radiographic studies, pulse oximetry and re-evaluation of patient's condition    I assumed direction of critical care for this patient from another provider in my specialty: no               Lab Results (last 24 hours)     Procedure Component Value Units Date/Time    Blood Culture - Blood, Arm, Right [[id number]] Collected:  01/19/20 1825    Specimen:  Blood from Arm, Right Updated:  01/19/20 1846    Lactic Acid, Plasma [[id number]]  (Abnormal) Collected:  01/19/20 1825    Specimen:  Blood Updated:  01/19/20 1859     Lactate 2.6 mmol/L     Lactic Acid, Reflex Timer (This will reflex a repeat order 3-3:15 hours after ordered.) [[id number]] Collected:  01/19/20 1825    Specimen:  Blood Updated:  01/19/20 1859    CBC & Differential [[id number]] Collected:  01/19/20 1926    Specimen:  Blood Updated:  01/19/20 1933    Narrative:       The following orders were created for panel order CBC & Differential.  Procedure                               Abnormality         Status                     ---------                               -----------         ------                     CBC Auto Differential[[id number]]        Abnormal            Final result                 Please view results for these tests on the individual orders.    CBC Auto Differential [[id number]]  (Abnormal) Collected:  01/19/20 1926    Specimen:  Blood Updated:  01/19/20 1933     WBC 15.15  10*3/mm3      RBC 3.78 10*6/mm3      Hemoglobin 11.9 g/dL      Hematocrit 36.8 %      MCV 97.4 fL      MCH 31.5 pg      MCHC 32.3 g/dL      RDW 15.9 %      RDW-SD 55.8 fl      MPV 10.2 fL      Platelets 310 10*3/mm3      Neutrophil % 50.2 %      Lymphocyte % 35.4 %      Monocyte % 3.2 %      Eosinophil % 10.0 %      Basophil % 0.7 %      Immature Grans % 0.5 %      Neutrophils, Absolute 7.61 10*3/mm3      Lymphocytes, Absolute 5.37 10*3/mm3      Monocytes, Absolute 0.48 10*3/mm3      Eosinophils, Absolute 1.52 10*3/mm3      Basophils, Absolute 0.10 10*3/mm3      Immature Grans, Absolute 0.07 10*3/mm3      nRBC 0.1 /100 WBC     Blood Gas, Venous [[id number]]  (Abnormal) Collected:  01/19/20 1955    Specimen:  Venous Blood Updated:  01/19/20 2000     Site OTHER     pH, Venous 7.322 pH Units      pCO2, Venous 54.4 mm Hg      Comment: 83 Value above reference range        pO2, Venous 21.9 mm Hg      Comment: 84 Value below reference range        HCO3, Venous 28.1 mmol/L      Comment: 83 Value above reference range        Base Excess, Venous 1.3 mmol/L      O2 Saturation, Venous 33.9 %      Comment: 84 Value below reference range        Temperature 37.0 C      Barometric Pressure for Blood Gas 763 mmHg      Modality BiPap     FIO2 40 %      Ventilator Mode NIV     Set Mec Resp Rate 12.0     IPAP 16     Comment: Meter: [id number]     :  [id number]        EPA 6     Collected by [id number]    Comprehensive Metabolic Panel [[id number]]  (Abnormal) Collected:  01/19/20 1956    Specimen:  Blood Updated:  01/19/20 2042     Glucose 227 mg/dL      BUN 35 mg/dL      Creatinine 7.96 mg/dL      Sodium 131 mmol/L      Potassium 5.5 mmol/L      Chloride 88 mmol/L      CO2 27.0 mmol/L      Calcium 9.2 mg/dL      Total Protein 7.1 g/dL      Albumin 4.40 g/dL      ALT (SGPT) 9 U/L      AST (SGOT) 17 U/L      Alkaline Phosphatase 61 U/L      Total Bilirubin 0.3 mg/dL      eGFR Non African Amer 5 mL/min/1.73      Comment: <15  Indicative of kidney failure.        eGFR   Amer --     Comment: <15 Indicative of kidney failure.        Globulin 2.7 gm/dL      A/G Ratio 1.6 g/dL      BUN/Creatinine Ratio 4.4     Anion Gap 16.0 mmol/L     Narrative:       GFR Normal >60  Chronic Kidney Disease <60  Kidney Failure <15      BNP [[id number]]  (Abnormal) Collected:  01/19/20 1956    Specimen:  Blood Updated:  01/19/20 2054     proBNP 30,479.0 pg/mL     Narrative:       Among patients with dyspnea, NT-proBNP is highly sensitive for the detection of acute congestive heart failure. In addition NT-proBNP of <300 pg/ml effectively rules out acute congestive heart failure with 99% negative predictive value.    Results may be falsely decreased if patient taking Biotin.      D-dimer, Quantitative [[id number]]  (Abnormal) Collected:  01/19/20 1956    Specimen:  Blood Updated:  01/19/20 2038     D-Dimer, Quantitative 2.31 mg/L (FEU)     Narrative:       Reference Range is 0-0.50 mg/L FEU. However, results <0.50 mg/L FEU tends to rule out DVT or PE. Results >0.50 mg/L FEU are not useful in predicting absence or presence of DVT or PE.      Troponin [[id number]]  (Abnormal) Collected:  01/19/20 1956    Specimen:  Blood Updated:  01/19/20 2043     Troponin T 0.031 ng/mL     Narrative:       Troponin T Reference Range:  <= 0.03 ng/mL-   Negative for AMI  >0.03 ng/mL-     Abnormal for myocardial necrosis.  Clinicians would have to utilize clinical acumen, EKG, Troponin and serial changes to determine if it is an Acute Myocardial Infarction or myocardial injury due to an underlying chronic condition.       Results may be falsely decreased if patient taking Biotin.      Blood Culture - Blood, Arm, Right [[id number]] Collected:  01/19/20 1956    Specimen:  Blood from Arm, Right Updated:  01/19/20 2030    Magnesium [[id number]]  (Normal) Collected:  01/19/20 1956    Specimen:  Blood Updated:  01/19/20 2042     Magnesium 2.4 mg/dL     Blood Gas, Venous [[id number]]  Collected:  01/19/20 1956    Specimen:  Venous Blood Updated:  01/19/20 2030          XR Chest 1 View   Final Result   Coarse interstitial markings as before, without lung   consolidation. There is improvement in aeration of the lung bases. No   evidence of overt CHF is identified.   This report was finalized on 01/19/2020 18:52 by Dr. Colton Hampton MD.          ED Course  ED Course as of Jan 19 2113   Sun Jan 19, 2020 2028 Patient is much better now.  Her pulse ox is in the 95 to 98% range.  She is alert and talkative.    [TR]   2104 We finally got the patient's chemistries back.  I spoke with Dr. Steiner about admission.  He is coming to see the patient.  She is much improved from her presentation initially.    [TR]      ED Course User Index  [TR] Angel Rodriguez Jr., MD          MDM  Number of Diagnoses or Management Options  Respiratory distress: new and requires workup     Amount and/or Complexity of Data Reviewed  Clinical lab tests: ordered and reviewed  Tests in the radiology section of CPT®: ordered and reviewed  Tests in the medicine section of CPT®: reviewed and ordered  Discuss the patient with other providers: yes    Risk of Complications, Morbidity, and/or Mortality  Presenting problems: high  Diagnostic procedures: moderate  Management options: high    Critical Care  Total time providing critical care: 30-74 minutes    Patient Progress  Patient progress: stable      Final diagnoses:   Respiratory distress          Angel Rodriguez Jr., MD  01/19/20 2113

## 2020-01-20 NOTE — PROGRESS NOTES
[facility] Medicine Services  INPATIENT PROGRESS NOTE    Patient Name: Mini Gentile  Date of Admission: 1/19/2020  Today's Date: 01/20/20  Length of Stay: 1  Primary Care Physician: Ivan Kelly MD    Subjective   Chief Complaint: Follow-up  HPI   She is a 59-year-old woman with known end-stage renal disease on dialysis, diabetes mellitus type 2 insulin treated presenting with sudden onset of shortness of breath with hypoxia.  She was described in respiratory distress, wheezing with rales.  Her proBNP was elevated at 30,000+ troponin slightly increased peaked at 0.069 and trending down.  Chest x-ray read as coarse interstitial markings without lung consolidation and without overt evidence of congestive heart failure.  She underwent CT angiogram of the chest that was interpreted without evidence of pulmonary embolus.  There is interstitial edema and small pleural effusion noted bilaterally.  There is a 1.1 cm left axillary lymph node.  The radiologist mentioned that her last mammogram was March 2018  White count has improved.  Potassium is 5.5.  Phosphorus 6.5.  Magnesium 2.6.  Lactic acidosis resolved at 1.2  She was started empirically on COPD treatment (Solu-Medrol, DuoNeb).  Nephrology consulted for hemodialysis.  He received IV Bumex.  The admitting diagnosis includes respiratory distress, anemia secondary to chronic kidney disease on dialysis among all others.  She also had poorly controlled hypertension.    She missed her dialysis last Saturday because she with nausea and diarrhea.  Her normal scheduled dialysis is on Tuesday, Thursday and Saturday. She said her household had likely viral illness that she picked up.  She is breathing a lot better.  Has not had any diarrhea since yesterday.  She continues to smoke 1 pack/day.  Review of Systems     All pertinent negatives and positives are as above. All other systems have been reviewed and are negative  unless otherwise stated.     Objective    Temp:  [94.8 °F (34.9 °C)-98.3 °F (36.8 °C)] 98.3 °F (36.8 °C)  Heart Rate:  [] 93  Resp:  [14-26] 14  BP: (122-179)/() 153/77  Physical Exam   Constitutional: She is oriented to person, place, and time. She appears well-developed and well-nourished. No distress.   HENT:   Head: Normocephalic and atraumatic.   Right Ear: External ear normal.   Left Ear: External ear normal.   Dry oral mucosa   Eyes: Pupils are equal, round, and reactive to light. Conjunctivae and EOM are normal. Right eye exhibits no discharge. Left eye exhibits no discharge. No scleral icterus.   Neck: Normal range of motion. Neck supple. No JVD present. No tracheal deviation present. No thyromegaly present.   Cardiovascular: Normal rate, regular rhythm and normal heart sounds.   Pulmonary/Chest: Effort normal.   Positive fine crackles   Abdominal: Soft. Bowel sounds are normal. She exhibits no distension and no mass. There is no tenderness. There is no guarding.   Musculoskeletal: Normal range of motion. She exhibits no edema.   AV fistula left arm   Neurological: She is alert and oriented to person, place, and time. No cranial nerve deficit. She exhibits normal muscle tone. Coordination normal.   Skin: Skin is warm and dry. Capillary refill takes less than 2 seconds. No rash noted. She is not diaphoretic. No erythema.   Psychiatric: She has a normal mood and affect. Her behavior is normal. Judgment and thought content normal.   Vitals reviewed.          Results Review:  I have reviewed the labs, radiology results, and diagnostic studies.    Laboratory Data:   Results from last 7 days   Lab Units 01/20/20  0249 01/19/20  1926   WBC 10*3/mm3 8.06 15.15*   HEMOGLOBIN g/dL 9.9* 11.9*   HEMATOCRIT % 29.7* 36.8   PLATELETS 10*3/mm3 207 310        Results from last 7 days   Lab Units 01/20/20  0249 01/19/20  1956   SODIUM mmol/L 132* 131*   POTASSIUM mmol/L 5.5* 5.5*   CHLORIDE mmol/L 91* 88*   CO2  mmol/L 26.0 27.0   BUN mg/dL 38* 35*   CREATININE mg/dL 8.50* 7.96*   CALCIUM mg/dL 9.4 9.2   BILIRUBIN mg/dL  --  0.3   ALK PHOS U/L  --  61   ALT (SGPT) U/L  --  9   AST (SGOT) U/L  --  17   GLUCOSE mg/dL 144* 227*       Culture Data:   Blood Culture   Date Value Ref Range Status   01/19/2020 No growth at less than 24 hours  Preliminary       Radiology Data:   Imaging Results (Last 24 Hours)     Procedure Component Value Units Date/Time    CT Angiogram Chest With & Without Contrast [[id number]] Collected:  01/20/20 0709     Updated:  01/20/20 0719    Narrative:       CT ANGIOGRAM CHEST W WO CONTRAST- 1/19/2020 10:03 PM CST      HISTORY: sudden onset respiratory distress; R06.03-Acute respiratory  distress      COMPARISON: None.      DOSE LENGTH PRODUCT: 422 mGy cm. Automated exposure control was also  utilized to decrease patient radiation dose.     TECHNIQUE: Helical tomographic images of the chest were obtained after  the administration of intravenous contrast following angiogram protocol.  Additionally, 3D and multiplanar reformatted images were provided.        FINDINGS:    Pulmonary arteries: There is adequate enhancement of the pulmonary  arteries to evaluate for central and segmental pulmonary emboli. There  are no filling defects within the main, lobar, segmental or visualized  subsegmental pulmonary arteries. The pulmonary arteries are within  normal limits for size.      Aorta and great vessels: There is atherosclerosis in the aorta without  aneurysm or dissection. There is a stent in the LEFT subclavian vein.  Coronary artery calcifications are present.     Visualized neck base: The imaged portion of the base of the neck appears  unremarkable.      Lungs: Interlobular septal thickening is noted in both lungs. There is a  region of lung collapse in the posterior RIGHT upper lobe. Patchy  groundglass opacities are noted. Peripheral areas of subpleural  atelectasis are seen in both lower lobes. Small  pleural effusions are  present bilaterally. The trachea and bronchial tree are patent.      Heart: The heart is normal in size. There is no pericardial effusion.      Mediastinum and lymph nodes: No enlarged mediastinal or hilar lymph  nodes are present. There is a small amount of fluid in the esophagus. In  the axillary tail of the LEFT breast or RIGHT axilla, there is a 1.1 cm  lymph node. No other lymphadenopathy is noted.      Skeletal and soft tissues: The osseous structures of the thorax and  surrounding soft tissues demonstrate no acute process.     Upper abdomen: The imaged portion of the upper abdomen demonstrates no  acute process.        Impression:       1. No evidence of pulmonary embolus.  2. Interstitial edema and small pleural effusions are noted bilaterally.  3. There is a 1.1 cm LEFT axillary lymph node. Please ensure that the  patient is up-to-date with mammography. According to the patient's chart  history, her last mammogram was performed in March 2018.     A preliminary report was provided by [facility]. I  agree with the preliminary interpretation.  This report was finalized on 01/20/2020 07:16 by Dr. Feng Simpson MD.    XR Chest 1 View [[id number]] Collected:  01/19/20 1848     Updated:  01/19/20 1855    Narrative:       EXAMINATION: XR CHEST 1 VW- 1/19/2020 6:48 PM CST     HISTORY: SOB.     REPORT: A frontal view the chest is compared with the chest x-ray  12/06/2019.     There are coarse interstitial markings as before, improved aeration of  the lung bases is demonstrated. There is no focal consolidation. Heart  size is normal. No pneumothorax or pleural effusion is identified. There  is a vascular stent in the left subclavian region. This is stable.       Impression:       Coarse interstitial markings as before, without lung  consolidation. There is improvement in aeration of the lung bases. No  evidence of overt CHF is identified.  This report was finalized on  01/19/2020 18:52 by Dr. Colton Hampton MD.          I have reviewed the patient's current medications.     Assessment/Plan     Active Hospital Problems    Diagnosis   • **Respiratory distress   • Anemia due to chronic kidney disease, on chronic dialysis (CMS/[facility], located within [facility])   • Non-compliance with renal dialysis (CMS/[facility], located within [facility])   • Elevated troponin due to ESRD    • Type 2 diabetes mellitus, with long-term current use of insulin (CMS/[facility], located within [facility])   • End stage renal failure on dialysis (CMS/[facility], located within [facility])   • Diabetic nephropathy (CMS/[facility], located within [facility])   • HTN (hypertension)       Respiratory distress is resolved  Bronchospasm resolved  Hypoxia per EMS  report  Tobacco abuse - Smoking cessation counseling  Diarrhea/nausea resolved  Elevated troponin felt secondary to CKD and possible volume overload from missing her dialysis  End-stage renal disease on dialysis  Diabetic nephropathy  Diabetes mellitus insulin treated  Hypertension  Anemia of chronic kidney disease    Plans  Patient is doing significantly better.  We will address blood pressure by resuming her medications and monitor closely  Renal service consulted for maintenance dialysis.  Continue sliding scale insulin  Last stress echocardiogram dates back to January 2017 showed with low risk for ischemia.  Echo  I anticipate she can be switched to oral steroid tomorrow and taper  Case discussed with nurse  Remain hemodynamically stable I anticipate that she can be moved to regular floor      carvedilol 6.25 mg Oral BID With Meals   enoxaparin 30 mg Subcutaneous Q24H   insulin lispro 0-9 Units Subcutaneous 4x Daily With Meals & Nightly   ipratropium-albuterol 3 mL Nebulization Q6H - RT   methylPREDNISolone sodium succinate 40 mg Intravenous Q12H   nicotine 1 patch Transdermal Nightly   sennosides-docusate 2 tablet Oral Nightly   sodium chloride 10 mL Intravenous Q12H             Discharge Planning:  To be determined  Sundeep Aldridge MD   01/20/20   7:28 AM

## 2020-01-21 ENCOUNTER — TELEPHONE (OUTPATIENT)
Dept: ADMINISTRATIVE | Age: 60
End: 2020-01-21

## 2020-01-21 VITALS
SYSTOLIC BLOOD PRESSURE: 188 MMHG | HEIGHT: 58 IN | HEART RATE: 96 BPM | TEMPERATURE: 98.2 F | OXYGEN SATURATION: 98 % | BODY MASS INDEX: 27.91 KG/M2 | DIASTOLIC BLOOD PRESSURE: 74 MMHG | RESPIRATION RATE: 16 BRPM | WEIGHT: 132.94 LBS

## 2020-01-21 PROBLEM — R59.9 LYMPH NODE ENLARGEMENT: Status: ACTIVE | Noted: 2020-01-21

## 2020-01-21 PROBLEM — I50.31 ACUTE DIASTOLIC HEART FAILURE (HCC): Status: ACTIVE | Noted: 2020-01-21

## 2020-01-21 PROBLEM — Z86.69 HISTORY OF GLAUCOMA: Status: ACTIVE | Noted: 2020-01-21

## 2020-01-21 LAB
GLUCOSE BLDC GLUCOMTR-MCNC: 137 MG/DL (ref 70–130)
GLUCOSE BLDC GLUCOMTR-MCNC: 160 MG/DL (ref 70–130)

## 2020-01-21 PROCEDURE — 63710000001 INSULIN LISPRO (HUMAN) PER 5 UNITS: Performed by: INTERNAL MEDICINE

## 2020-01-21 PROCEDURE — 94799 UNLISTED PULMONARY SVC/PX: CPT

## 2020-01-21 PROCEDURE — 25010000002 ENOXAPARIN PER 10 MG: Performed by: INTERNAL MEDICINE

## 2020-01-21 PROCEDURE — 94760 N-INVAS EAR/PLS OXIMETRY 1: CPT

## 2020-01-21 PROCEDURE — 99406 BEHAV CHNG SMOKING 3-10 MIN: CPT

## 2020-01-21 PROCEDURE — 94618 PULMONARY STRESS TESTING: CPT

## 2020-01-21 PROCEDURE — 63710000001 PREDNISONE PER 1 MG: Performed by: INTERNAL MEDICINE

## 2020-01-21 PROCEDURE — 82962 GLUCOSE BLOOD TEST: CPT

## 2020-01-21 RX ORDER — ALBUTEROL SULFATE 90 UG/1
2 AEROSOL, METERED RESPIRATORY (INHALATION) EVERY 4 HOURS PRN
Qty: 18 G | Refills: 0 | Status: ON HOLD | OUTPATIENT
Start: 2020-01-21 | End: 2021-01-01

## 2020-01-21 RX ORDER — LOSARTAN POTASSIUM 50 MG/1
50 TABLET ORAL DAILY
Status: DISCONTINUED | OUTPATIENT
Start: 2020-01-21 | End: 2020-01-21 | Stop reason: HOSPADM

## 2020-01-21 RX ORDER — PREDNISONE 20 MG/1
40 TABLET ORAL
Status: DISCONTINUED | OUTPATIENT
Start: 2020-01-21 | End: 2020-01-21 | Stop reason: HOSPADM

## 2020-01-21 RX ORDER — NICOTINE 21 MG/24HR
1 PATCH, TRANSDERMAL 24 HOURS TRANSDERMAL NIGHTLY
Qty: 28 PATCH | Refills: 0 | Status: SHIPPED | OUTPATIENT
Start: 2020-01-21 | End: 2020-02-09 | Stop reason: HOSPADM

## 2020-01-21 RX ORDER — FAMOTIDINE 20 MG/1
20 TABLET, FILM COATED ORAL DAILY
Status: DISCONTINUED | OUTPATIENT
Start: 2020-01-22 | End: 2020-01-21 | Stop reason: HOSPADM

## 2020-01-21 RX ORDER — PREDNISONE 20 MG/1
40 TABLET ORAL
Qty: 4 TABLET | Refills: 0 | Status: SHIPPED | OUTPATIENT
Start: 2020-01-21 | End: 2020-01-25

## 2020-01-21 RX ORDER — CARVEDILOL 6.25 MG/1
12.5 TABLET ORAL 2 TIMES DAILY WITH MEALS
Status: DISCONTINUED | OUTPATIENT
Start: 2020-01-21 | End: 2020-01-21 | Stop reason: HOSPADM

## 2020-01-21 RX ORDER — SERTRALINE HYDROCHLORIDE 25 MG/1
25 TABLET, FILM COATED ORAL DAILY
Status: ON HOLD | COMMUNITY
End: 2021-01-01

## 2020-01-21 RX ORDER — CARVEDILOL 12.5 MG/1
12.5 TABLET ORAL 2 TIMES DAILY WITH MEALS
Qty: 60 TABLET | Refills: 0 | Status: ON HOLD | OUTPATIENT
Start: 2020-01-21 | End: 2020-05-01

## 2020-01-21 RX ADMIN — LOSARTAN POTASSIUM 50 MG: 50 TABLET, FILM COATED ORAL at 13:59

## 2020-01-21 RX ADMIN — ENOXAPARIN SODIUM 30 MG: 30 INJECTION SUBCUTANEOUS at 08:47

## 2020-01-21 RX ADMIN — ASPIRIN 81 MG: 81 TABLET ORAL at 14:00

## 2020-01-21 RX ADMIN — PREDNISONE 40 MG: 20 TABLET ORAL at 13:59

## 2020-01-21 RX ADMIN — BRIMONIDINE TARTRATE 1 DROP: 2 SOLUTION/ DROPS OPHTHALMIC at 08:46

## 2020-01-21 RX ADMIN — VITAMIN D 2000 UNITS: 25 TAB ORAL at 14:00

## 2020-01-21 RX ADMIN — TIMOLOL MALEATE 1 DROP: 5 SOLUTION OPHTHALMIC at 06:34

## 2020-01-21 RX ADMIN — INSULIN LISPRO 2 UNITS: 100 INJECTION, SOLUTION INTRAVENOUS; SUBCUTANEOUS at 08:23

## 2020-01-21 RX ADMIN — CALCITRIOL 0.25 MCG: 0.25 CAPSULE ORAL at 14:00

## 2020-01-21 RX ADMIN — DORZOLAMIDE HYDROCHLORIDE 1 DROP: 20 SOLUTION OPHTHALMIC at 08:47

## 2020-01-21 RX ADMIN — FAMOTIDINE 20 MG: 20 TABLET, FILM COATED ORAL at 06:34

## 2020-01-21 RX ADMIN — FLUTICASONE PROPIONATE 1 SPRAY: 50 SPRAY, METERED NASAL at 08:46

## 2020-01-21 RX ADMIN — IPRATROPIUM BROMIDE AND ALBUTEROL SULFATE 3 ML: 2.5; .5 SOLUTION RESPIRATORY (INHALATION) at 06:51

## 2020-01-21 RX ADMIN — CARVEDILOL 12.5 MG: 6.25 TABLET, FILM COATED ORAL at 14:00

## 2020-01-21 NOTE — DISCHARGE SUMMARY
[facility] Medicine Services  DISCHARGE SUMMARY       Date of Admission: 1/19/2020  Date of Discharge:  1/21/2020  Primary Care Physician: Ivan Kelly MD    Discharge Diagnoses:  Active Hospital Problems    Diagnosis   • **Respiratory distress; acute hypoxic respiratory failure likely from acute diastolic heart failure from fluid overload and poorly controlled hypertension   • Acute diastolic heart failure (CMS/HCC)   • Lymph node enlargement, left axillary measuring 1.1 cm -follow-up for primary care   • Anemia due to chronic kidney disease, on chronic dialysis (CMS/HCC)   • Non-compliance with renal dialysis (CMS/HCC)   • Elevated troponin due to ESRD    • Type 2 diabetes mellitus, with long-term current use of insulin (CMS/HCC); A1c is approximately 5   • End stage renal failure on dialysis (CMS/HCC)   • Diabetic nephropathy (CMS/HCC)   • HTN (hypertension)         Presenting Problem/History of Present Illness:  Respiratory distress [R06.03]         Hospital Course  She is a 59-year-old woman with known end-stage renal disease on dialysis, diabetes mellitus type 2 insulin treated presenting with sudden onset of shortness of breath with hypoxia.  She was described in respiratory distress, wheezing with rales.  Her proBNP was elevated at 30,000+ troponin slightly increased peaked at 0.069 and trending down.  Chest x-ray read as coarse interstitial markings without lung consolidation and without overt evidence of congestive heart failure.  She underwent CT angiogram of the chest that was interpreted without evidence of pulmonary embolus.  There is interstitial edema and small pleural effusion noted bilaterally.  There is a 1.1 cm left axillary lymph node.  The radiologist mentioned that her last mammogram was March 2018  White count has improved.  Potassium is 5.5.  Phosphorus 6.5.  Magnesium 2.6.  Lactic acidosis resolved at 1.2.    She was started empirically on  "'COPD treatment \"(Solu-Medrol, DuoNeb) although she has not been ever been diagnosed with COPD.  She is a chronic smoker though which I counseled her on day of discharge.  I also instructed her to follow up with PCP as she may benefit from PFT.      Nephrology consulted for hemodialysis.  She received IV Bumex.  The admitting diagnoses include respiratory distress, anemia secondary to chronic kidney disease on dialysis among all others.  I left for nephrology to manage hyponatremia and hyperkalemia which I felt can be addressed through dialysis.  She was dialyzed yesterday.  I recommend follow-up BMP.  Timing deferred to nephrology.    She also had poorly controlled hypertension which I made revision on her carvedilol.  Echo showed:  · Estimated EF = 55%.  · Left ventricular systolic function is normal.  · Left ventricular diastolic dysfunction (grade II) consistent with pseudonormalization.  · Left atrial cavity size is mildly dilated.  · Mild-to-moderate mitral valve regurgitation is present     Apparently, last Saturday she had diarrhea and did not feel well resulting to miss her dialysis schedule.  This may have contributed to fluid retention and consequential elevated blood pressure and acute diastolic heart failure probably on chronic.    She is feeling significantly better.  She is not requiring oxygen at rest.  I will check her pre-and post exercise oxygen to determine any oxygen requirement during exertion.    Overall she is doing significantly better and felt medically appropriate for discharge.  She expressed readiness to go home.  I instructed her to follow-up with primary care provider regarding the left axillary lymph node and to make necessary age-appropriate screening for cancer including but not limited to mammogram.    Case discussed with nurse Arndt.    Procedures Performed: None      Consults:   Dr. Gonzalez    Pertinent Test Results:   Lab Results (last 72 hours)     Procedure Component Value " Units Date/Time    POC Glucose Once [[id number]]  (Abnormal) Collected:  01/21/20 0802    Specimen:  Blood Updated:  01/21/20 0817     Glucose 160 mg/dL      Comment: : [id number] Rudy KapoorMettoro ID: [id number]       POC Glucose Once [[id number]]  (Abnormal) Collected:  01/20/20 2035    Specimen:  Blood Updated:  01/20/20 2046     Glucose 388 mg/dL      Comment: : [id number] Deon Arzate ID: [id number]       Blood Culture - Blood, Arm, Right [[id number]] Collected:  01/19/20 1956    Specimen:  Blood from Arm, Right Updated:  01/20/20 2045     Blood Culture No growth at 24 hours    Blood Culture - Blood, Arm, Right [[id number]] Collected:  01/19/20 1825    Specimen:  Blood from Arm, Right Updated:  01/20/20 1900     Blood Culture No growth at 24 hours    POC Glucose Once [[id number]]  (Abnormal) Collected:  01/20/20 1719    Specimen:  Blood Updated:  01/20/20 1749     Glucose 147 mg/dL      Comment: : [id number] Merritt SmithelaMeter ID: [id number]       POC Glucose Once [[id number]]  (Abnormal) Collected:  01/20/20 1048    Specimen:  Blood Updated:  01/20/20 1112     Glucose 281 mg/dL      Comment: : [id number] Merritt PamelaMeter ID: [id number]       POC Glucose Once [[id number]]  (Abnormal) Collected:  01/20/20 0742    Specimen:  Blood Updated:  01/20/20 0804     Glucose 208 mg/dL      Comment: : [id number] Anabela Bates ID: [id number]       Troponin [[id number]]  (Abnormal) Collected:  01/20/20 0422    Specimen:  Blood Updated:  01/20/20 0518     Troponin T 0.055 ng/mL     Narrative:       Troponin T Reference Range:  <= 0.03 ng/mL-   Negative for AMI  >0.03 ng/mL-     Abnormal for myocardial necrosis.  Clinicians would have to utilize clinical acumen, EKG, Troponin and serial changes to determine if it is an Acute Myocardial Infarction or myocardial injury due to an underlying chronic condition.       Results may be falsely decreased if patient taking Biotin.      Basic Metabolic  Panel [[id number]]  (Abnormal) Collected:  01/20/20 0249    Specimen:  Blood Updated:  01/20/20 0343     Glucose 144 mg/dL      BUN 38 mg/dL      Creatinine 8.50 mg/dL      Sodium 132 mmol/L      Potassium 5.5 mmol/L      Chloride 91 mmol/L      CO2 26.0 mmol/L      Calcium 9.4 mg/dL      eGFR   Amer --     Comment: <15 Indicative of kidney failure.        eGFR Non African Amer 5 mL/min/1.73      Comment: <15 Indicative of kidney failure.        BUN/Creatinine Ratio 4.5     Anion Gap 15.0 mmol/L     Narrative:       GFR Normal >60  Chronic Kidney Disease <60  Kidney Failure <15      Phosphorus [[id number]]  (Abnormal) Collected:  01/20/20 0249    Specimen:  Blood Updated:  01/20/20 0343     Phosphorus 6.5 mg/dL     Magnesium [[id number]]  (Abnormal) Collected:  01/20/20 0249    Specimen:  Blood Updated:  01/20/20 0343     Magnesium 2.6 mg/dL     CBC Auto Differential [[id number]]  (Abnormal) Collected:  01/20/20 0249    Specimen:  Blood Updated:  01/20/20 0331     WBC 8.06 10*3/mm3      RBC 3.10 10*6/mm3      Hemoglobin 9.9 g/dL      Hematocrit 29.7 %      MCV 95.8 fL      MCH 31.9 pg      MCHC 33.3 g/dL      RDW 15.2 %      RDW-SD 52.3 fl      MPV 9.4 fL      Platelets 207 10*3/mm3      Neutrophil % 90.0 %      Lymphocyte % 8.1 %      Monocyte % 1.1 %      Eosinophil % 0.2 %      Basophil % 0.2 %      Immature Grans % 0.4 %      Neutrophils, Absolute 7.25 10*3/mm3      Lymphocytes, Absolute 0.65 10*3/mm3      Monocytes, Absolute 0.09 10*3/mm3      Eosinophils, Absolute 0.02 10*3/mm3      Basophils, Absolute 0.02 10*3/mm3      Immature Grans, Absolute 0.03 10*3/mm3      nRBC 0.0 /100 WBC     Hemoglobin A1c [[id number]]  (Normal) Collected:  01/19/20 1926    Specimen:  Blood Updated:  01/20/20 0047     Hemoglobin A1C 5.00 %     Narrative:       Hemoglobin A1C Ranges:    Increased Risk for Diabetes  5.7% to 6.4%  Diabetes                     >= 6.5%  Diabetic Goal                < 7.0%    POC Glucose Once  [[id number]]  (Abnormal) Collected:  01/19/20 2315    Specimen:  Blood Updated:  01/19/20 2335     Glucose 168 mg/dL      Comment: : [id number] Sharif (Guthrie) MadisonMeter ID: [id number]       Troponin [[id number]]  (Abnormal) Collected:  01/19/20 2241    Specimen:  Blood Updated:  01/19/20 2310     Troponin T 0.069 ng/mL     Narrative:       Troponin T Reference Range:  <= 0.03 ng/mL-   Negative for AMI  >0.03 ng/mL-     Abnormal for myocardial necrosis.  Clinicians would have to utilize clinical acumen, EKG, Troponin and serial changes to determine if it is an Acute Myocardial Infarction or myocardial injury due to an underlying chronic condition.       Results may be falsely decreased if patient taking Biotin.      Lactic Acid, Reflex [[id number]]  (Normal) Collected:  01/19/20 2207    Specimen:  Blood Updated:  01/19/20 2228     Lactate 1.2 mmol/L     Lactic Acid, Reflex Timer (This will reflex a repeat order 3-3:15 hours after ordered.) [[id number]] Collected:  01/19/20 1825    Specimen:  Blood Updated:  01/19/20 2200     Extra Tube Hold for add-ons.     Comment: Auto resulted.       BNP [[id number]]  (Abnormal) Collected:  01/19/20 1956    Specimen:  Blood Updated:  01/19/20 2054     proBNP 30,479.0 pg/mL     Narrative:       Among patients with dyspnea, NT-proBNP is highly sensitive for the detection of acute congestive heart failure. In addition NT-proBNP of <300 pg/ml effectively rules out acute congestive heart failure with 99% negative predictive value.    Results may be falsely decreased if patient taking Biotin.      Troponin [[id number]]  (Abnormal) Collected:  01/19/20 1956    Specimen:  Blood Updated:  01/19/20 2043     Troponin T 0.031 ng/mL     Narrative:       Troponin T Reference Range:  <= 0.03 ng/mL-   Negative for AMI  >0.03 ng/mL-     Abnormal for myocardial necrosis.  Clinicians would have to utilize clinical acumen, EKG, Troponin and serial changes to determine if it is an Acute  Myocardial Infarction or myocardial injury due to an underlying chronic condition.       Results may be falsely decreased if patient taking Biotin.      Comprehensive Metabolic Panel [[id number]]  (Abnormal) Collected:  01/19/20 1956    Specimen:  Blood Updated:  01/19/20 2042     Glucose 227 mg/dL      BUN 35 mg/dL      Creatinine 7.96 mg/dL      Sodium 131 mmol/L      Potassium 5.5 mmol/L      Chloride 88 mmol/L      CO2 27.0 mmol/L      Calcium 9.2 mg/dL      Total Protein 7.1 g/dL      Albumin 4.40 g/dL      ALT (SGPT) 9 U/L      AST (SGOT) 17 U/L      Alkaline Phosphatase 61 U/L      Total Bilirubin 0.3 mg/dL      eGFR Non African Amer 5 mL/min/1.73      Comment: <15 Indicative of kidney failure.        eGFR   Amer --     Comment: <15 Indicative of kidney failure.        Globulin 2.7 gm/dL      A/G Ratio 1.6 g/dL      BUN/Creatinine Ratio 4.4     Anion Gap 16.0 mmol/L     Narrative:       GFR Normal >60  Chronic Kidney Disease <60  Kidney Failure <15      Magnesium [[id number]]  (Normal) Collected:  01/19/20 1956    Specimen:  Blood Updated:  01/19/20 2042     Magnesium 2.4 mg/dL     D-dimer, Quantitative [[id number]]  (Abnormal) Collected:  01/19/20 1956    Specimen:  Blood Updated:  01/19/20 2038     D-Dimer, Quantitative 2.31 mg/L (FEU)     Narrative:       Reference Range is 0-0.50 mg/L FEU. However, results <0.50 mg/L FEU tends to rule out DVT or PE. Results >0.50 mg/L FEU are not useful in predicting absence or presence of DVT or PE.      Blood Gas, Venous [[id number]] Collected:  01/19/20 1956    Specimen:  Venous Blood Updated:  01/19/20 2030    Blood Gas, Venous [[id number]]  (Abnormal) Collected:  01/19/20 1955    Specimen:  Venous Blood Updated:  01/19/20 2000     Site OTHER     pH, Venous 7.322 pH Units      pCO2, Venous 54.4 mm Hg      Comment: 83 Value above reference range        pO2, Venous 21.9 mm Hg      Comment: 84 Value below reference range        HCO3, Venous 28.1 mmol/L      Comment: 83  Value above reference range        Base Excess, Venous 1.3 mmol/L      O2 Saturation, Venous 33.9 %      Comment: 84 Value below reference range        Temperature 37.0 C      Barometric Pressure for Blood Gas 763 mmHg      Modality BiPap     FIO2 40 %      Ventilator Mode NIV     Set [facility] Resp Rate 12.0     IPAP 16     Comment: Meter: [id number]     :  [id number]        EPAP 6     Collected by [id number]    CBC & Differential [[id number]] Collected:  01/19/20 1926    Specimen:  Blood Updated:  01/19/20 1933    Narrative:       The following orders were created for panel order CBC & Differential.  Procedure                               Abnormality         Status                     ---------                               -----------         ------                     CBC Auto Differential[[id number]]        Abnormal            Final result                 Please view results for these tests on the individual orders.    CBC Auto Differential [[id number]]  (Abnormal) Collected:  01/19/20 1926    Specimen:  Blood Updated:  01/19/20 1933     WBC 15.15 10*3/mm3      RBC 3.78 10*6/mm3      Hemoglobin 11.9 g/dL      Hematocrit 36.8 %      MCV 97.4 fL      MCH 31.5 pg      MCHC 32.3 g/dL      RDW 15.9 %      RDW-SD 55.8 fl      MPV 10.2 fL      Platelets 310 10*3/mm3      Neutrophil % 50.2 %      Lymphocyte % 35.4 %      Monocyte % 3.2 %      Eosinophil % 10.0 %      Basophil % 0.7 %      Immature Grans % 0.5 %      Neutrophils, Absolute 7.61 10*3/mm3      Lymphocytes, Absolute 5.37 10*3/mm3      Monocytes, Absolute 0.48 10*3/mm3      Eosinophils, Absolute 1.52 10*3/mm3      Basophils, Absolute 0.10 10*3/mm3      Immature Grans, Absolute 0.07 10*3/mm3      nRBC 0.1 /100 WBC     [place] Draw [[id number]] Collected:  01/19/20 1820    Specimen:  Blood Updated:  01/19/20 1930    Narrative:       The following orders were created for panel order [place] Draw.  Procedure                               Abnormality          Status                     ---------                               -----------         ------                     Light Blue Top[[id number]]                                   Final result               Green Top (Gel)[[id number]]                                  Final result               Lavender Top[[id number]]                                     Final result               Red Top[[id number]]                                          Final result                 Please view results for these tests on the individual orders.    Lavender Top [[id number]] Collected:  01/19/20 1820    Specimen:  Blood Updated:  01/19/20 1930     Extra Tube hold for add-on     Comment: Auto resulted       Light Blue Top [[id number]] Collected:  01/19/20 1821    Specimen:  Blood Updated:  01/19/20 1930     Extra Tube hold for add-on     Comment: Auto resulted       Green Top (Gel) [[id number]] Collected:  01/19/20 1820    Specimen:  Blood Updated:  01/19/20 1930     Extra Tube Hold for add-ons.     Comment: Auto resulted.       Red Top [[id number]] Collected:  01/19/20 1820    Specimen:  Blood Updated:  01/19/20 1930     Extra Tube Hold for add-ons.     Comment: Auto resulted.       Lactic Acid, Plasma [[id number]]  (Abnormal) Collected:  01/19/20 1825    Specimen:  Blood Updated:  01/19/20 1859     Lactate 2.6 mmol/L         Imaging Results (Last 72 Hours)     Procedure Component Value Units Date/Time    SCANNED - IMAGING [[id number]] Resulted:  01/19/20      Updated:  01/20/20 1327    CT Angiogram Chest With & Without Contrast [[id number]] Collected:  01/20/20 0709     Updated:  01/20/20 0719    Narrative:       CT ANGIOGRAM CHEST W WO CONTRAST- 1/19/2020 10:03 PM CST      HISTORY: sudden onset respiratory distress; R06.03-Acute respiratory  distress      COMPARISON: None.      DOSE LENGTH PRODUCT: 422 mGy cm. Automated exposure control was also  utilized to decrease patient radiation dose.     TECHNIQUE: Helical tomographic images of the chest  were obtained after  the administration of intravenous contrast following angiogram protocol.  Additionally, 3D and multiplanar reformatted images were provided.        FINDINGS:    Pulmonary arteries: There is adequate enhancement of the pulmonary  arteries to evaluate for central and segmental pulmonary emboli. There  are no filling defects within the main, lobar, segmental or visualized  subsegmental pulmonary arteries. The pulmonary arteries are within  normal limits for size.      Aorta and great vessels: There is atherosclerosis in the aorta without  aneurysm or dissection. There is a stent in the LEFT subclavian vein.  Coronary artery calcifications are present.     Visualized neck base: The imaged portion of the base of the neck appears  unremarkable.      Lungs: Interlobular septal thickening is noted in both lungs. There is a  region of lung collapse in the posterior RIGHT upper lobe. Patchy  groundglass opacities are noted. Peripheral areas of subpleural  atelectasis are seen in both lower lobes. Small pleural effusions are  present bilaterally. The trachea and bronchial tree are patent.      Heart: The heart is normal in size. There is no pericardial effusion.      Mediastinum and lymph nodes: No enlarged mediastinal or hilar lymph  nodes are present. There is a small amount of fluid in the esophagus. In  the axillary tail of the LEFT breast or RIGHT axilla, there is a 1.1 cm  lymph node. No other lymphadenopathy is noted.      Skeletal and soft tissues: The osseous structures of the thorax and  surrounding soft tissues demonstrate no acute process.     Upper abdomen: The imaged portion of the upper abdomen demonstrates no  acute process.        Impression:       1. No evidence of pulmonary embolus.  2. Interstitial edema and small pleural effusions are noted bilaterally.  3. There is a 1.1 cm LEFT axillary lymph node. Please ensure that the  patient is up-to-date with mammography. According to the  "patient's chart  history, her last mammogram was performed in March 2018.     [facility]. I  agree with the preliminary interpretation.  This report was finalized on 01/20/2020 07:16 by Dr. Feng Simpson MD.    XR Chest 1 View [[id number]] Collected:  01/19/20 1848     Updated:  01/19/20 1855    Narrative:       EXAMINATION: XR CHEST 1 VW- 1/19/2020 6:48 PM CST     HISTORY: SOB.     REPORT: A frontal view the chest is compared with the chest x-ray  12/06/2019.     There are coarse interstitial markings as before, improved aeration of  the lung bases is demonstrated. There is no focal consolidation. Heart  size is normal. No pneumothorax or pleural effusion is identified. There  is a vascular stent in the left subclavian region. This is stable.       Impression:       Coarse interstitial markings as before, without lung  consolidation. There is improvement in aeration of the lung bases. No  evidence of overt CHF is identified.  This report was finalized on 01/19/2020 18:52 by Dr. Colton Hampton MD.          Condition on Discharge: Stable  Physical Exam on Discharge:  /96 (BP Location: Right arm, Patient Position: Lying)   Pulse 85   Temp 97.7 °F (36.5 °C) (Oral)   Resp 16   Ht 147.3 cm (58\")   Wt 60.3 kg (132 lb 15 oz)   SpO2 98%   BMI 27.78 kg/m²   Physical Exam  Constitutional: She is oriented to person, place, and time. She appears well-developed and well-nourished. No distress.   HENT:   Head: Normocephalic and atraumatic.   Right Ear: External ear normal.   Left Ear: External ear normal.   Eyes: Pupils are equal, round.   She appears to have strabismus on the right eye.  Conjunctivae and EOM are normal. Right eye exhibits no discharge. Left eye exhibits no discharge. No scleral icterus.   Neck: Normal range of motion. Neck supple. No JVD present. No tracheal deviation present. No thyromegaly present.   Cardiovascular: Normal rate, regular " rhythm and normal heart sounds.   Pulmonary/Chest: Effort normal.     Fine crackles appears to have decreased significantly  Abdominal: Soft. Bowel sounds are normal. She exhibits no distension and no mass. There is no tenderness. There is no guarding.   Musculoskeletal: Normal range of motion. She exhibits no edema.   AV fistula left arm   Neurological: She is alert and oriented to person, place, and time. No cranial nerve deficit. She exhibits normal muscle tone. Coordination normal.   Skin: Skin is warm and dry. Capillary refill takes less than 2 seconds. No rash noted. She is not diaphoretic. No erythema.   Psychiatric: She has a normal mood and affect. Her behavior is normal. Judgment and thought content normal.   Vitals reviewed.    Discharge Disposition:  Home or Self Care    Discharge Medications:     Discharge Medications      New Medications      Instructions Start Date   albuterol sulfate  (90 Base) MCG/ACT inhaler  Commonly known as:  PROVENTIL HFA;VENTOLIN HFA;PROAIR HFA   2 puffs, Inhalation, Every 4 Hours PRN      nicotine 21 MG/24HR patch  Commonly known as:  NICODERM CQ   1 patch, Transdermal, Nightly      predniSONE 20 MG tablet  Commonly known as:  DELTASONE   40 mg, Oral, Daily With Breakfast         Changes to Medications      Instructions Start Date   carvedilol 12.5 MG tablet  Commonly known as:  COREG  What changed:    · medication strength  · how much to take   12.5 mg, Oral, 2 Times Daily With Meals         Continue These Medications      Instructions Start Date   aspirin 81 MG EC tablet   81 mg, Oral, Daily      brimonidine 0.2 % ophthalmic solution  Commonly known as:  ALPHAGAN   1 drop, Left Eye, 3 Times Daily      calcitriol 0.25 MCG capsule  Commonly known as:  ROCALTROL   0.25 mcg, Oral, Take As Directed, Only given on dialysis days (Tuesday, Thursday, Saturday)      cetirizine 10 MG tablet  Commonly known as:  zyrTEC   10 mg, Oral, Nightly PRN      cholecalciferol 25 MCG (1000  UT) tablet  Commonly known as:  VITAMIN D3   2,000 Units, Oral, Daily      dorzolamide 2 % ophthalmic solution  Commonly known as:  TRUSOPT   1 drop, Left Eye, 2 Times Daily      famotidine 20 MG tablet  Commonly known as:  PEPCID   20 mg, Oral, 2 Times Daily      fluticasone 50 MCG/ACT nasal spray  Commonly known as:  FLONASE   2 sprays, Nasal, 2 Times Daily      homatropine 5 % ophthalmic solution   1 drop, Left Eye, Daily      insulin glargine 100 UNIT/ML injection  Commonly known as:  LANTUS   10 Units, Subcutaneous, Nightly      lidocaine-prilocaine 2.5-2.5 % cream  Commonly known as:  EMLA   1 application, Topical, Take As Directed, Apply to access area 30 minutes prior to dialysis.      losartan 50 MG tablet  Commonly known as:  COZAAR   50 mg, Oral, Daily      midodrine 5 MG tablet  Commonly known as:  PROAMATINE   5 mg, Oral, Take As Directed, as needed for low blood pressure during dialysis.      pravastatin 10 MG tablet  Commonly known as:  PRAVACHOL   10 mg, Oral, Nightly      RHOPRESSA 0.02 % solution  Generic drug:  Netarsudil Dimesylate   1 drop, Left Eye, Nightly      sertraline 25 MG tablet  Commonly known as:  ZOLOFT   25 mg, Oral, Daily      sevelamer 800 MG tablet  Commonly known as:  RENAGEL   2,400 mg, Oral, 3 Times Daily With Meals      timolol 0.5 % ophthalmic solution  Commonly known as:  TIMOPTIC   1 drop, Left Eye, Every Morning      TRAVATAN Z 0.004 % solution ophthalmic solution  Generic drug:  travoprost (BAK free)   1 drop, Left Eye, Nightly             Discharge Diet:   Diet Instructions     Diet: Consistent Carbohydrate, Renal, Cardiac; Thin      Discharge Diet:   Consistent Carbohydrate  Renal  Cardiac       Fluid Consistency:  Thin          Discharge Care Plan / Instructions:   Smoking cessation counseling  Monitor blood pressure and bring record to primary care provider for adjustment of medication as needed      Activity at Discharge:   Activity Instructions     Activity as  Tolerated            Follow-up Appointments:   Primary care provider in 1 week -blood pressure monitoring; consider pulmonary function testing; follow-up left axillary lymph node  Keep scheduled dialysis appointment as per nephrology  Nephrology follow-up per their recommendation  Test Results Pending at Discharge:    Order Current Status    Blood Gas, Venous In process    Blood Culture - Blood, Arm, Right Preliminary result    Blood Culture - Blood, Arm, Right Preliminary result           Sundeep Aldridge MD  01/21/20  11:18 AM    Time: Greater than 30 minutes      Part of this note may be an electronic transcription/translation of spoken language to printed text using the Dragon Dictation System.

## 2020-01-21 NOTE — PLAN OF CARE
Problem: Patient Care Overview  Goal: Plan of Care Review  Outcome: Ongoing (interventions implemented as appropriate)  Flowsheets (Taken 1/21/2020 0519)  Progress: no change  Plan of Care Reviewed With: patient  Outcome Summary: Patient has no c/o pain. Resting comfortably. VSS. Safety maintained. Will continue to monitor.

## 2020-01-21 NOTE — PROGRESS NOTES
"Pharmacy Dosing Service  Automatic Renal Adjustment  Pepcid    Assessment/Action/Plan:  Based on prescribing information provided by the drug , Pepcid 20 mg PO BID, has been changed to Pepcid 20 mg PO every 24 hours. Pharmacy will continue to monitor daily and make further adjustment(s) accordingly.     Subjective:  Mini Gentile is a 59 y.o. female     Additional Factors Considered:  Patient disposition per documentation  Disease state or condition being treated    Objective:  Ht: 147.3 cm (58\"); Wt: 60.3 kg (132 lb 15 oz)  Estimated Creatinine Clearance: 5.7 mL/min (A) (by C-G formula based on SCr of 8.5 mg/dL (H)).   HD    Lab Results   Component Value Date    CREATININE 8.50 (H) 01/20/2020    CREATININE 7.96 (H) 01/19/2020    CREATININE 6.47 (H) 12/11/2019    CREATININE 6.6 (H) 08/09/2019    CREATININE 4.5 (H) 09/10/2018    CREATININE 3.4 (H) 07/11/2018       MICAHEL Shepherd, PharmROBERTH  01/21/20 11:22 AM    "

## 2020-01-21 NOTE — PROGRESS NOTES
Exercise Oximetry    Patient Name:Mini Gentile   MRN: [id number]   Date: 01/21/20             ROOM AIR BASELINE   SpO2% 97   Heart Rate 96   Blood Pressure      EXERCISE ON ROOM AIR SpO2% EXERCISE ON O2 @  LPM SpO2%   1 MINUTE 97 1 MINUTE    2 MINUTES 97 2 MINUTES    3 MINUTES 98 3 MINUTES    4 MINUTES 98 4 MINUTES    5 MINUTES 97 5 MINUTES    6 MINUTES 99 6 MINUTES               Distance Walked  One hallway down and back Distance Walked   Dyspnea (Boris Scale)   Dyspnea (Boris Scale)   Fatigue (Boris Scale)   Fatigue (Boris Scale)   SpO2% Post Exercise 99 SpO2% Post Exercise   HR Post Exercise  107 HR Post Exercise   Time to Recovery    Time to Recovery     Comments: PT SAT did not qualify for home O2

## 2020-01-22 ENCOUNTER — READMISSION MANAGEMENT (OUTPATIENT)
Dept: CALL CENTER | Facility: HOSPITAL | Age: 60
End: 2020-01-22

## 2020-01-22 NOTE — PAYOR COMM NOTE
"DC HOME 1-21-20  [id number]  Luana Peña (59 y.o. Female)     Date of Birth Social Security Number Address Home Phone MRN    [id number]  [id number] [place] [phone] [id number]    Jehovah's witness Marital Status          Other        Admission Date Admission Type Admitting Provider Attending Provider Department, Room/Bed    1/19/20 Emergency Sundeep Aldridge MD  [address], 496/1    Discharge Date Discharge Disposition Discharge Destination        1/21/2020 Home or Self Care Home             Attending Provider:  (none)   Allergies:  Bupropion, Chantix [Varenicline], Sulfa Antibiotics, Azithromycin, Levofloxacin, Penicillins    Isolation:  None   Infection:  None   Code Status:  Prior    Ht:  147.3 cm (58\")   Wt:  60.3 kg (132 lb 15 oz)    Admission Cmt:  None   Principal Problem:  Respiratory distress [R06.03]                 Active Insurance as of 1/19/2020     Primary Coverage     Payor Plan Insurance Group Employer/Plan Group    MEDICARE MEDICARE A & B      Payor Plan Address Payor Plan Phone Number Payor Plan Fax Number Effective Dates    [address] [phone]  7/1/2017 - None Entered    [facility] [id number]       Subscriber Name Subscriber Birth Date Member ID       LUANA PEÑA 1960 [id number]           Secondary Coverage     Payor Plan Insurance Group Employer/Plan Group    [employer] [place]ICAID      Payor Plan Address Payor Plan Phone Number Payor Plan Fax Number Effective Dates    [address] [address] [phone]  11/4/2016 - None Entered    [facility] [address]       Subscriber Name Subscriber Birth Date Member ID       LUANA PEÑA 1960 [id number]                 Emergency Contacts      (Rel.) Home Phone Work Phone Mobile Phone    Erica Bowling (Daughter) -- -- [phone]               Discharge Summary      Sundeep Aldridge MD at 01/21/20 0942              [facility] " "Hospital Medicine Services  DISCHARGE SUMMARY       Date of Admission: 1/19/2020  Date of Discharge:  1/21/2020  Primary Care Physician: Ivan Kelly MD    Discharge Diagnoses:  Active Hospital Problems    Diagnosis   • **Respiratory distress; acute hypoxic respiratory failure likely from acute diastolic heart failure from fluid overload and poorly controlled hypertension   • Acute diastolic heart failure (CMS/HCC)   • Lymph node enlargement, left axillary measuring 1.1 cm -follow-up for primary care   • Anemia due to chronic kidney disease, on chronic dialysis (CMS/Formerly [facility])   • Non-compliance with renal dialysis (CMS/Formerly [facility])   • Elevated troponin due to ESRD    • Type 2 diabetes mellitus, with long-term current use of insulin (CMS/[facility]); A1c is approximately 5   • End stage renal failure on dialysis (CMS/Formerly [facility])   • Diabetic nephropathy (CMS/[facility]ly [facility])   • HTN (hypertension)         Presenting Problem/History of Present Illness:  Respiratory distress [R06.03]         Hospital Course  She is a 59-year-old woman with known end-stage renal disease on dialysis, diabetes mellitus type 2 insulin treated presenting with sudden onset of shortness of breath with hypoxia.  She was described in respiratory distress, wheezing with rales.  Her proBNP was elevated at 30,000+ troponin slightly increased peaked at 0.069 and trending down.  Chest x-ray read as coarse interstitial markings without lung consolidation and without overt evidence of congestive heart failure.  She underwent CT angiogram of the chest that was interpreted without evidence of pulmonary embolus.  There is interstitial edema and small pleural effusion noted bilaterally.  There is a 1.1 cm left axillary lymph node.  The radiologist mentioned that her last mammogram was March 2018  White count has improved.  Potassium is 5.5.  Phosphorus 6.5.  Magnesium 2.6.  Lactic acidosis resolved at 1.2.    She was started empirically on 'COPD treatment \"(Solu-Medrol, DuoNeb) " although she has not been ever been diagnosed with COPD.  She is a chronic smoker though which I counseled her on day of discharge.  I also instructed her to follow up with PCP as she may benefit from PFT.      Nephrology consulted for hemodialysis.   She received IV Bumex.  The admitting diagnoses include respiratory distress, anemia secondary to chronic kidney disease on dialysis among all others.  I left for nephrology to manage hyponatremia and hyperkalemia which I felt can be addressed through dialysis.  She was dialyzed yesterday.  I recommend follow-up BMP.  Timing deferred to nephrology.    She also had poorly controlled hypertension which I made revision on her carvedilol.  Echo showed:  · Estimated EF = 55%.  · Left ventricular systolic function is normal.  · Left ventricular diastolic dysfunction (grade II) consistent with pseudonormalization.  · Left atrial cavity size is mildly dilated.  · Mild-to-moderate mitral valve regurgitation is present     Apparently, last Saturday she had diarrhea and did not feel well resulting to miss her dialysis schedule.  This may have contributed to fluid retention and consequential elevated blood pressure and acute diastolic heart failure probably on chronic.    She is feeling significantly better.  She is not requiring oxygen at rest.  I will check her pre-and post exercise oxygen to determine any oxygen requirement during exertion.    Overall she is doing significantly better and felt medically appropriate for discharge.  She expressed readiness to go home.  I instructed her to follow-up with primary care provider regarding the left axillary lymph node and to make necessary age-appropriate screening for cancer including but not limited to mammogram.    Case discussed with nurse Arndt.    Procedures Performed: None      Consults:   Dr. Gonzalez    Pertinent Test Results:   Lab Results (last 72 hours)     Procedure Component Value Units Date/Time    POC Glucose Once  [[id number]]  (Abnormal) Collected:  01/21/20 0802    Specimen:  Blood Updated:  01/21/20 0817     Glucose 160 mg/dL      Comment: : [id number] Rudy EmeliaMeter ID: [id number]       POC Glucose Once [[id number]]  (Abnormal) Collected:  01/20/20 2035    Specimen:  Blood Updated:  01/20/20 2046     Glucose 388 mg/dL      Comment: : [id number] Deon Arzate ID: [id number]       Blood Culture - Blood, Arm, Right [[id number]] Collected:  01/19/20 1956    Specimen:  Blood from Arm, Right Updated:  01/20/20 2045     Blood Culture No growth at 24 hours    Blood Culture - Blood, Arm, Right [[id number]] Collected:  01/19/20 1825    Specimen:  Blood from Arm, Right Updated:  01/20/20 1900     Blood Culture No growth at 24 hours    POC Glucose Once [[id number]]  (Abnormal) Collected:  01/20/20 1719    Specimen:  Blood Updated:  01/20/20 1749     Glucose 147 mg/dL      Comment: : [id number] Merritt SmithelaMeter ID: [id number]       POC Glucose Once [[id number]]  (Abnormal) Collected:  01/20/20 1048    Specimen:  Blood Updated:  01/20/20 1112     Glucose 281 mg/dL      Comment: : [id number] Merritt SmithelaMeter ID: [id number]       POC Glucose Once [[id number]]  (Abnormal) Collected:  01/20/20 0742    Specimen:  Blood Updated:  01/20/20 0804     Glucose 208 mg/dL      Comment: : [id number] Anabela Bates ID: [id number]       Troponin [[id number]]  (Abnormal) Collected:  01/20/20 0422    Specimen:  Blood Updated:  01/20/20 0518     Troponin T 0.055 ng/mL     Narrative:       Troponin T Reference Range:  <= 0.03 ng/mL-   Negative for AMI  >0.03 ng/mL-     Abnormal for myocardial necrosis.  Clinicians would have to utilize clinical acumen, EKG, Troponin and serial changes to determine if it is an Acute Myocardial Infarction or myocardial injury due to an underlying chronic condition.       Results may be falsely decreased if patient taking Biotin.      Basic Metabolic Panel [[id number]]  (Abnormal)  Collected:  01/20/20 0249    Specimen:  Blood Updated:  01/20/20 0343     Glucose 144 mg/dL      BUN 38 mg/dL      Creatinine 8.50 mg/dL      Sodium 132 mmol/L      Potassium 5.5 mmol/L      Chloride 91 mmol/L      CO2 26.0 mmol/L      Calcium 9.4 mg/dL      eGFR   Amer --     Comment: <15 Indicative of kidney failure.        eGFR Non African Amer 5 mL/min/1.73      Comment: <15 Indicative of kidney failure.        BUN/Creatinine Ratio 4.5     Anion Gap 15.0 mmol/L     Narrative:       GFR Normal >60  Chronic Kidney Disease <60  Kidney Failure <15      Phosphorus [[id number]]  (Abnormal) Collected:  01/20/20 0249    Specimen:  Blood Updated:  01/20/20 0343     Phosphorus 6.5 mg/dL     Magnesium [[id number]]  (Abnormal) Collected:  01/20/20 0249    Specimen:  Blood Updated:  01/20/20 0343     Magnesium 2.6 mg/dL     CBC Auto Differential [[id number]]  (Abnormal) Collected:  01/20/20 0249    Specimen:  Blood Updated:  01/20/20 0331     WBC 8.06 10*3/mm3      RBC 3.10 10*6/mm3      Hemoglobin 9.9 g/dL      Hematocrit 29.7 %      MCV 95.8 fL      MCH 31.9 pg      MCHC 33.3 g/dL      RDW 15.2 %      RDW-SD 52.3 fl      MPV 9.4 fL      Platelets 207 10*3/mm3      Neutrophil % 90.0 %      Lymphocyte % 8.1 %      Monocyte % 1.1 %      Eosinophil % 0.2 %      Basophil % 0.2 %      Immature Grans % 0.4 %      Neutrophils, Absolute 7.25 10*3/mm3      Lymphocytes, Absolute 0.65 10*3/mm3      Monocytes, Absolute 0.09 10*3/mm3      Eosinophils, Absolute 0.02 10*3/mm3      Basophils, Absolute 0.02 10*3/mm3      Immature Grans, Absolute 0.03 10*3/mm3      nRBC 0.0 /100 WBC     Hemoglobin A1c [[id number]]  (Normal) Collected:  01/19/20 1926    Specimen:  Blood Updated:  01/20/20 0047     Hemoglobin A1C 5.00 %     Narrative:       Hemoglobin A1C Ranges:    Increased Risk for Diabetes  5.7% to 6.4%  Diabetes                     >= 6.5%  Diabetic Goal                < 7.0%    POC Glucose Once [[id number]]  (Abnormal) Collected:   01/19/20 2315    Specimen:  Blood Updated:  01/19/20 2335     Glucose 168 mg/dL      Comment: : [id number] Chante Wheat (Guthrie)Meter ID: [id number]       Troponin [[id number]]  (Abnormal) Collected:  01/19/20 2241    Specimen:  Blood Updated:  01/19/20 2310     Troponin T 0.069 ng/mL     Narrative:       Troponin T Reference Range:  <= 0.03 ng/mL-   Negative for AMI  >0.03 ng/mL-     Abnormal for myocardial necrosis.  Clinicians would have to utilize clinical acumen, EKG, Troponin and serial changes to determine if it is an Acute Myocardial Infarction or myocardial injury due to an underlying chronic condition.       Results may be falsely decreased if patient taking Biotin.      Lactic Acid, Reflex [[id number]]  (Normal) Collected:  01/19/20 2207    Specimen:  Blood Updated:  01/19/20 2228     Lactate 1.2 mmol/L     Lactic Acid, Reflex Timer (This will reflex a repeat order 3-3:15 hours after ordered.) [[id number]] Collected:  01/19/20 1825    Specimen:  Blood Updated:  01/19/20 2200     Extra Tube Hold for add-ons.     Comment: Auto resulted.       BNP [[id number]]  (Abnormal) Collected:  01/19/20 1956    Specimen:  Blood Updated:  01/19/20 2054     proBNP 30,479.0 pg/mL     Narrative:       Among patients with dyspnea, NT-proBNP is highly sensitive for the detection of acute congestive heart failure. In addition NT-proBNP of <300 pg/ml effectively rules out acute congestive heart failure with 99% negative predictive value.    Results may be falsely decreased if patient taking Biotin.      Troponin [[id number]]  (Abnormal) Collected:  01/19/20 1956    Specimen:  Blood Updated:  01/19/20 2043     Troponin T 0.031 ng/mL     Narrative:       Troponin T Reference Range:  <= 0.03 ng/mL-   Negative for AMI  >0.03 ng/mL-     Abnormal for myocardial necrosis.  Clinicians would have to utilize clinical acumen, EKG, Troponin and serial changes to determine if it is an Acute Myocardial Infarction or myocardial injury  due to an underlying chronic condition.       Results may be falsely decreased if patient taking Biotin.      Comprehensive Metabolic Panel [[id number]]  (Abnormal) Collected:  01/19/20 1956    Specimen:  Blood Updated:  01/19/20 2042     Glucose 227 mg/dL      BUN 35 mg/dL      Creatinine 7.96 mg/dL      Sodium 131 mmol/L      Potassium 5.5 mmol/L      Chloride 88 mmol/L      CO2 27.0 mmol/L      Calcium 9.2 mg/dL      Total Protein 7.1 g/dL      Albumin 4.40 g/dL      ALT (SGPT) 9 U/L      AST (SGOT) 17 U/L      Alkaline Phosphatase 61 U/L      Total Bilirubin 0.3 mg/dL      eGFR Non African Amer 5 mL/min/1.73      Comment: <15 Indicative of kidney failure.        eGFR   Amer --     Comment: <15 Indicative of kidney failure.        Globulin 2.7 gm/dL      A/G Ratio 1.6 g/dL      BUN/Creatinine Ratio 4.4     Anion Gap 16.0 mmol/L     Narrative:       GFR Normal >60  Chronic Kidney Disease <60  Kidney Failure <15      Magnesium [[id number]]  (Normal) Collected:  01/19/20 1956    Specimen:  Blood Updated:  01/19/20 2042     Magnesium 2.4 mg/dL     D-dimer, Quantitative [[id number]]  (Abnormal) Collected:  01/19/20 1956    Specimen:  Blood Updated:  01/19/20 2038     D-Dimer, Quantitative 2.31 mg/L (FEU)     Narrative:       Reference Range is 0-0.50 mg/L FEU. However, results <0.50 mg/L FEU tends to rule out DVT or PE. Results >0.50 mg/L FEU are not useful in predicting absence or presence of DVT or PE.      Blood Gas, Venous [[id number]] Collected:  01/19/20 1956    Specimen:  Venous Blood Updated:  01/19/20 2030    Blood Gas, Venous [[id number]]  (Abnormal) Collected:  01/19/20 1955    Specimen:  Venous Blood Updated:  01/19/20 2000     Site OTHER     pH, Venous 7.322 pH Units      pCO2, Venous 54.4 mm Hg      Comment: 83 Value above reference range        pO2, Venous 21.9 mm Hg      Comment: 84 Value below reference range        HCO3, Venous 28.1 mmol/L      Comment: 83 Value above reference range        Base  Excess, Venous 1.3 mmol/L      O2 Saturation, Venous 33.9 %      Comment: 84 Value below reference range        Temperature 37.0 C      Barometric Pressure for Blood Gas 763 mmHg      Modality BiPap     FIO2 40 %      Ventilator Mode NIV     Set [facility] Resp Rate 12.0     IPAP 16     Comment: Meter: [id number]     :  [id number]        EPAP 6     Collected by [id number]    CBC & Differential [[id number]] Collected:  01/19/20 1926    Specimen:  Blood Updated:  01/19/20 1933    Narrative:       The following orders were created for panel order CBC & Differential.  Procedure                               Abnormality         Status                     ---------                               -----------         ------                     CBC Auto Differential[[id number]]        Abnormal            Final result                 Please view results for these tests on the individual orders.    CBC Auto Differential [[id number]]  (Abnormal) Collected:  01/19/20 1926    Specimen:  Blood Updated:  01/19/20 1933     WBC 15.15 10*3/mm3      RBC 3.78 10*6/mm3      Hemoglobin 11.9 g/dL      Hematocrit 36.8 %      MCV 97.4 fL      MCH 31.5 pg      MCHC 32.3 g/dL      RDW 15.9 %      RDW-SD 55.8 fl      MPV 10.2 fL      Platelets 310 10*3/mm3      Neutrophil % 50.2 %      Lymphocyte % 35.4 %      Monocyte % 3.2 %      Eosinophil % 10.0 %      Basophil % 0.7 %      Immature Grans % 0.5 %      Neutrophils, Absolute 7.61 10*3/mm3      Lymphocytes, Absolute 5.37 10*3/mm3      Monocytes, Absolute 0.48 10*3/mm3      Eosinophils, Absolute 1.52 10*3/mm3      Basophils, Absolute 0.10 10*3/mm3      Immature Grans, Absolute 0.07 10*3/mm3      nRBC 0.1 /100 WBC     [place] Draw [[id number]] Collected:  01/19/20 1820    Specimen:  Blood Updated:  01/19/20 1930    Narrative:       The following orders were created for panel order [place] Draw.  Procedure                               Abnormality         Status                     ---------                                -----------         ------                     Light Blue Top[[id number]]                                   Final result               Green Top (Gel)[[id number]]                                  Final result               Lavender Top[[id number]]                                     Final result               Red Top[[id number]]                                          Final result                 Please view results for these tests on the individual orders.    Lavender Top [[id number]] Collected:  01/19/20 1820    Specimen:  Blood Updated:  01/19/20 1930     Extra Tube hold for add-on     Comment: Auto resulted       Light Blue Top [[id number]] Collected:  01/19/20 1821    Specimen:  Blood Updated:  01/19/20 1930     Extra Tube hold for add-on     Comment: Auto resulted       Green Top (Gel) [[id number]] Collected:  01/19/20 1820    Specimen:  Blood Updated:  01/19/20 1930     Extra Tube Hold for add-ons.     Comment: Auto resulted.       Red Top [[id number]] Collected:  01/19/20 1820    Specimen:  Blood Updated:  01/19/20 1930     Extra Tube Hold for add-ons.     Comment: Auto resulted.       Lactic Acid, Plasma [[id number]]  (Abnormal) Collected:  01/19/20 1825    Specimen:  Blood Updated:  01/19/20 1859     Lactate 2.6 mmol/L         Imaging Results (Last 72 Hours)     Procedure Component Value Units Date/Time    SCANNED - IMAGING [[id number]] Resulted:  01/19/20      Updated:  01/20/20 1327    CT Angiogram Chest With & Without Contrast [[id number]] Collected:  01/20/20 0709     Updated:  01/20/20 0719    Narrative:       CT ANGIOGRAM CHEST W WO CONTRAST- 1/19/2020 10:03 PM CST      HISTORY: sudden onset respiratory distress; R06.03-Acute respiratory  distress      COMPARISON: None.      DOSE LENGTH PRODUCT: 422 mGy cm. Automated exposure control was also  utilized to decrease patient radiation dose.     TECHNIQUE: Helical tomographic images of the chest were obtained after  the administration of  intravenous contrast following angiogram protocol.  Additionally, 3D and multiplanar reformatted images were provided.        FINDINGS:    Pulmonary arteries: There is adequate enhancement of the pulmonary  arteries to evaluate for central and segmental pulmonary emboli. There  are no filling defects within the main, lobar, segmental or visualized  subsegmental pulmonary arteries. The pulmonary arteries are within  normal limits for size.      Aorta and great vessels: There is atherosclerosis in the aorta without  aneurysm or dissection. There is a stent in the LEFT subclavian vein.  Coronary artery calcifications are present.     Visualized neck base: The imaged portion of the base of the neck appears  unremarkable.      Lungs: Interlobular septal thickening is noted in both lungs. There is a  region of lung collapse in the posterior RIGHT upper lobe. Patchy  groundglass opacities are noted. Peripheral areas of subpleural  atelectasis are seen in both lower lobes. Small pleural effusions are  present bilaterally. The trachea and bronchial tree are patent.      Heart: The heart is normal in size. There is no pericardial effusion.      Mediastinum and lymph nodes: No enlarged mediastinal or hilar lymph  nodes are present. There is a small amount of fluid in the esophagus. In  the axillary tail of the LEFT breast or RIGHT axilla, there is a 1.1 cm  lymph node. No other lymphadenopathy is noted.      Skeletal and soft tissues: The osseous structures of the thorax and  surrounding soft tissues demonstrate no acute process.     Upper abdomen: The imaged portion of the upper abdomen demonstrates no  acute process.        Impression:       1. No evidence of pulmonary embolus.  2. Interstitial edema and small pleural effusions are noted bilaterally.  3. There is a 1.1 cm LEFT axillary lymph node. Please ensure that the  patient is up-to-date with mammography. According to the patient's chart  history, her last mammogram was  "performed in March 2018.     A preliminary report was provided by [facility]. I  agree with the preliminary interpretation.  This report was finalized on 01/20/2020 07:16 by Dr. Feng Simpson MD.    XR Chest 1 View [[id number]] Collected:  01/19/20 1848     Updated:  01/19/20 1855    Narrative:       EXAMINATION: XR CHEST 1 VW- 1/19/2020 6:48 PM CST     HISTORY: SOB.     REPORT: A frontal view the chest is compared with the chest x-ray  12/06/2019.     There are coarse interstitial markings as before, improved aeration of  the lung bases is demonstrated. There is no focal consolidation. Heart  size is normal. No pneumothorax or pleural effusion is identified. There  is a vascular stent in the left subclavian region. This is stable.       Impression:       Coarse interstitial markings as before, without lung  consolidation. There is improvement in aeration of the lung bases. No  evidence of overt CHF is identified.  This report was finalized on 01/19/2020 18:52 by Dr. Colton Hampton MD.          Condition on Discharge: Stable  Physical Exam on Discharge:  /96 (BP Location: Right arm, Patient Position: Lying)   Pulse 85   Temp 97.7 °F (36.5 °C) (Oral)   Resp 16   Ht 147.3 cm (58\")   Wt 60.3 kg (132 lb 15 oz)   SpO2 98%   BMI 27.78 kg/m²    Physical Exam  Constitutional: She is oriented to person, place, and time. She appears well-developed and well-nourished. No distress.   HENT:   Head: Normocephalic and atraumatic.   Right Ear: External ear normal.   Left Ear: External ear normal.   Eyes: Pupils are equal, round.    She appears to have strabismus on the right eye.  Conjunctivae and EOM are normal. Right eye exhibits no discharge. Left eye exhibits no discharge. No scleral icterus.   Neck: Normal range of motion. Neck supple. No JVD present. No tracheal deviation present. No thyromegaly present.   Cardiovascular: Normal rate, regular rhythm and normal heart sounds.   Pulmonary/Chest: " Effort normal.     Fine crackles appears to have decreased significantly  Abdominal: Soft. Bowel sounds are normal. She exhibits no distension and no mass. There is no tenderness. There is no guarding.   Musculoskeletal: Normal range of motion. She exhibits no edema.   AV fistula left arm   Neurological: She is alert and oriented to person, place, and time. No cranial nerve deficit. She exhibits normal muscle tone. Coordination normal.   Skin: Skin is warm and dry. Capillary refill takes less than 2 seconds. No rash noted. She is not diaphoretic. No erythema.   Psychiatric: She has a normal mood and affect. Her behavior is normal. Judgment and thought content normal.   Vitals reviewed.    Discharge Disposition:  Home or Self Care    Discharge Medications:     Discharge Medications      New Medications      Instructions Start Date   albuterol sulfate  (90 Base) MCG/ACT inhaler  Commonly known as:  PROVENTIL HFA;VENTOLIN HFA;PROAIR HFA   2 puffs, Inhalation, Every 4 Hours PRN      nicotine 21 MG/24HR patch  Commonly known as:  NICODERM CQ   1 patch, Transdermal, Nightly      predniSONE 20 MG tablet  Commonly known as:  DELTASONE   40 mg, Oral, Daily With Breakfast         Changes to Medications      Instructions Start Date   carvedilol 12.5 MG tablet  Commonly known as:  COREG  What changed:    · medication strength  · how much to take   12.5 mg, Oral, 2 Times Daily With Meals         Continue These Medications      Instructions Start Date   aspirin 81 MG EC tablet   81 mg, Oral, Daily      brimonidine 0.2 % ophthalmic solution  Commonly known as:  ALPHAGAN   1 drop, Left Eye, 3 Times Daily      calcitriol 0.25 MCG capsule  Commonly known as:  ROCALTROL   0.25 mcg, Oral, Take As Directed, Only given on dialysis days (Tuesday, Thursday, Saturday)      cetirizine 10 MG tablet  Commonly known as:  zyrTEC   10 mg, Oral, Nightly PRN      cholecalciferol 25 MCG (1000 UT) tablet  Commonly known as:  VITAMIN D3   2,000  Units, Oral, Daily      dorzolamide 2 % ophthalmic solution  Commonly known as:  TRUSOPT   1 drop, Left Eye, 2 Times Daily      famotidine 20 MG tablet  Commonly known as:  PEPCID   20 mg, Oral, 2 Times Daily      fluticasone 50 MCG/ACT nasal spray  Commonly known as:  FLONASE   2 sprays, Nasal, 2 Times Daily      homatropine 5 % ophthalmic solution   1 drop, Left Eye, Daily      insulin glargine 100 UNIT/ML injection  Commonly known as:  LANTUS   10 Units, Subcutaneous, Nightly      lidocaine-prilocaine 2.5-2.5 % cream  Commonly known as:  EMLA   1 application, Topical, Take As Directed, Apply to access area 30 minutes prior to dialysis.      losartan 50 MG tablet  Commonly known as:  COZAAR   50 mg, Oral, Daily      midodrine 5 MG tablet  Commonly known as:  PROAMATINE   5 mg, Oral, Take As Directed, as needed for low blood pressure during dialysis.      pravastatin 10 MG tablet  Commonly known as:  PRAVACHOL   10 mg, Oral, Nightly      RHOPRESSA 0.02 % solution  Generic drug:  Netarsudil Dimesylate   1 drop, Left Eye, Nightly      sertraline 25 MG tablet  Commonly known as:  ZOLOFT   25 mg, Oral, Daily      sevelamer 800 MG tablet  Commonly known as:  RENAGEL   2,400 mg, Oral, 3 Times Daily With Meals      timolol 0.5 % ophthalmic solution  Commonly known as:  TIMOPTIC   1 drop, Left Eye, Every Morning      TRAVATAN Z 0.004 % solution ophthalmic solution  Generic drug:  travoprost (BAK free)   1 drop, Left Eye, Nightly             Discharge Diet:   Diet Instructions     Diet: Consistent Carbohydrate, Renal, Cardiac; Thin      Discharge Diet:   Consistent Carbohydrate  Renal  Cardiac       Fluid Consistency:  Thin          Discharge Care Plan / Instructions:   Smoking cessation counseling  Monitor blood pressure and bring record to primary care provider for adjustment of medication as needed      Activity at Discharge:   Activity Instructions     Activity as Tolerated            Follow-up Appointments:   Primary  care provider in 1 week -blood pressure monitoring; consider pulmonary function testing; follow-up left axillary lymph node  Keep scheduled dialysis appointment as per nephrology  Nephrology follow-up per their recommendation  Test Results Pending at Discharge:    Order Current Status    Blood Gas, Venous In process    Blood Culture - Blood, Arm, Right Preliminary result    Blood Culture - Blood, Arm, Right Preliminary result           Sundeep Aldridge MD  01/21/20  11:18 AM    Time: Greater than 30 minutes      Part of this note may be an electronic transcription/translation of spoken language to printed text using the Dragon Dictation System.            Electronically signed by Sundeep Aldridge MD at 01/21/20 2159

## 2020-01-22 NOTE — OUTREACH NOTE
Prep Survey      Responses   Facility patient discharged from?  [facility]   Is patient eligible?  Yes   Discharge diagnosis  Respiratory distress,  acute hypoxic respiratory failure likely from acute diastolic heart failure from fluid overload and poorly controlled hypertension   Does the patient have one of the following disease processes/diagnoses(primary or secondary)?  CHF   Does the patient have Home health ordered?  No   Is there a DME ordered?  No   General alerts for this patient  HD pt    Prep survey completed?  Yes          Lula Alberto RN

## 2020-01-23 ENCOUNTER — READMISSION MANAGEMENT (OUTPATIENT)
Dept: CALL CENTER | Facility: HOSPITAL | Age: 60
End: 2020-01-23

## 2020-01-23 ENCOUNTER — TELEPHONE (OUTPATIENT)
Dept: INTERNAL MEDICINE | Age: 60
End: 2020-01-23

## 2020-01-23 NOTE — TELEPHONE ENCOUNTER
with any concerns.     Scheduled appointment with PCP within 7-14 days    Follow Up  Future Appointments   Date Time Provider Darrell Pirce   1/27/2020  1:30 PM Joan Hughes APRN - NP [address] [facility] Jose Jewell LPN

## 2020-01-23 NOTE — OUTREACH NOTE
CHF Week 1 Survey      Responses   Facility patient discharged from?  [facility]   Does the patient have one of the following disease processes/diagnoses(primary or secondary)?  CHF   Is there a successful TCM telephone encounter documented?  No   CHF Week 1 attempt successful?  Yes   Call start time  0911   Call end time  0915   General alerts for this patient  HD pt    Discharge diagnosis  Respiratory distress,  acute hypoxic respiratory failure likely from acute diastolic heart failure from fluid overload and poorly controlled hypertension   Meds reviewed with patient/caregiver?  Yes   Is the patient having any side effects they believe may be caused by any medication additions or changes?  No   Does the patient have all medications ordered at discharge?  Yes   Is the patient taking all medications as directed (includes completed medication regime)?  Yes   Does the patient have a primary care provider?   Yes   Does the patient have an appointment with their PCP within 7 days of discharge?  No   What is preventing the patient from scheduling follow up appointments within 7 days of discharge?  Haven't had time   Nursing Interventions  Advised patient to make appointment   Has the patient kept scheduled appointments due by today?  N/A   Has home health visited the patient within 72 hours of discharge?  N/A   Psychosocial issues?  No   Did the patient receive a copy of their discharge instructions?  Yes   Nursing interventions  Reviewed instructions with patient   What is the patient's perception of their health status since discharge?  Improving   Nursing interventions  Nurse provided patient education   Is the patient weighing daily?  Yes   Does the patient have scales?  Yes   Daily weight interventions  Education provided on importance of daily weight   Is the patient able to teach back Heart Failure diet management?  Yes   Is the patient able to teach back Heart Failure Zones?  Yes   Is the patient able to teach  back signs and symptoms of worsening condition? (i.e. weight gain, shortness of air, etc.)  Yes   Is the patient/caregiver able to teach back the hierarchy of who to call/visit for symptoms/problems? PCP, Specialist, Home health nurse, Urgent Care, ED, 911  Yes    CHF Week 1 call completed?  Yes          Sterling Ramon RN

## 2020-01-24 LAB
BACTERIA SPEC AEROBE CULT: NORMAL
BACTERIA SPEC AEROBE CULT: NORMAL

## 2020-01-27 ENCOUNTER — OFFICE VISIT (OUTPATIENT)
Dept: PRIMARY CARE CLINIC | Age: 60
End: 2020-01-27
Payer: MEDICARE

## 2020-01-27 VITALS
BODY MASS INDEX: 27.21 KG/M2 | RESPIRATION RATE: 16 BRPM | SYSTOLIC BLOOD PRESSURE: 150 MMHG | WEIGHT: 135 LBS | OXYGEN SATURATION: 97 % | TEMPERATURE: 98.6 F | HEIGHT: 59 IN | HEART RATE: 71 BPM | DIASTOLIC BLOOD PRESSURE: 84 MMHG

## 2020-01-27 PROBLEM — I50.31 ACUTE DIASTOLIC HEART FAILURE (HCC): Status: ACTIVE | Noted: 2020-01-27

## 2020-01-27 PROCEDURE — 99495 TRANSJ CARE MGMT MOD F2F 14D: CPT | Performed by: NURSE PRACTITIONER

## 2020-01-27 PROCEDURE — 1111F DSCHRG MED/CURRENT MED MERGE: CPT | Performed by: NURSE PRACTITIONER

## 2020-01-27 ASSESSMENT — PATIENT HEALTH QUESTIONNAIRE - PHQ9
SUM OF ALL RESPONSES TO PHQ QUESTIONS 1-9: 0
1. LITTLE INTEREST OR PLEASURE IN DOING THINGS: 0
SUM OF ALL RESPONSES TO PHQ9 QUESTIONS 1 & 2: 0
2. FEELING DOWN, DEPRESSED OR HOPELESS: 0
SUM OF ALL RESPONSES TO PHQ QUESTIONS 1-9: 0

## 2020-01-27 NOTE — PROGRESS NOTES
apply route. Pt will also need new lancet device if not included in kit. brimonidine (ALPHAGAN P) 0.15 % ophthalmic solution  Place 1 drop into the left eye 3 times daily              Cetirizine HCl (ZYRTEC ALLERGY PO)  Take 10 mg by mouth daily              Cholecalciferol (VITAMIN D3) 1000 units CAPS  Take by mouth             dorzolamide (TRUSOPT) 2 % ophthalmic solution  Place 1 drop into the left eye 2 times daily              famotidine (PEPCID) 20 MG tablet  TAKE 1 TABLET BY MOUTH TWICE DAILY             fluticasone (FLONASE) 50 MCG/ACT nasal spray  2 sprays by Nasal route daily. To keep ears opened up             FREESTYLE LITE strip  USE TO TEST BLOOD SUGAR TWICE DAILY             Glucose Blood (BLOOD GLUCOSE TEST STRIPS) STRP  Check blood sugar twice a day for recent medication adjustments. homatropine 5 % ophthalmic solution  Place 1 drop into the left eye daily              insulin glargine (LANTUS SOLOSTAR) 100 UNIT/ML injection pen  INJECT 10 UNITS INTO THE SKIN ONCE DAILY             Insulin Syringe-Needle U-100 (ACCUSURE INS SYR 1CC/31GX5/16\") 31G X 5/16\" 1 ML MISC  by Does not apply route.              ipratropium-albuterol (DUONEB) 0.5-2.5 (3) MG/3ML SOLN nebulizer solution  Inhale 3 mLs into the lungs every 4 hours             lactulose (CHRONULAC) 10 GM/15ML solution  TAKE 30 ML BY MOUTH THREE TIMES DAILY             losartan (COZAAR) 50 MG tablet  Take 1 tablet by mouth daily             midodrine (PROAMATINE) 5 MG tablet  TK 1 T PO HALF WAY THROUGH TREATMENT AS DIRECTED             pravastatin (PRAVACHOL) 10 MG tablet  TAKE 1 TABLET BY MOUTH EVERY NIGHT AT BEDTIME             sertraline (ZOLOFT) 25 MG tablet  Take 1 tablet by mouth daily             sevelamer (RENVELA) 800 MG tablet  Take 1 tablet by mouth daily              timolol (TIMOPTIC-XR) 0.5 % ophthalmic gel-forming  Place 1 drop into the left eye 2 times daily              Travoprost, ALLISON Free, (TRAVATAN Z) diaphoretic. HENT:      Head: Normocephalic. Right Ear: External ear normal.      Left Ear: External ear normal.      Nose: Nose normal.      Mouth/Throat:      Mouth: Mucous membranes are moist.      Pharynx: Oropharynx is clear. No oropharyngeal exudate. Eyes:      General:         Right eye: No discharge. Left eye: No discharge. Conjunctiva/sclera: Conjunctivae normal.      Pupils: Pupils are equal, round, and reactive to light. Neck:      Musculoskeletal: Normal range of motion. Vascular: Carotid bruit (left) present. Trachea: No tracheal deviation. Cardiovascular:      Rate and Rhythm: Normal rate and regular rhythm. Pulses: Normal pulses. Heart sounds: Murmur present. Pulmonary:      Effort: Pulmonary effort is normal. No respiratory distress. Breath sounds: No stridor. Wheezing (intermittent bilateral) present. Abdominal:      General: Bowel sounds are normal.      Palpations: Abdomen is soft. Tenderness: There is no abdominal tenderness. Musculoskeletal: Normal range of motion. General: No tenderness or deformity. Lymphadenopathy:      Cervical: No cervical adenopathy. Skin:     General: Skin is warm and dry. Capillary Refill: Capillary refill takes less than 2 seconds. Findings: No rash. Neurological:      Mental Status: She is alert and oriented to person, place, and time. Cranial Nerves: No cranial nerve deficit. Psychiatric:         Mood and Affect: Mood normal.         Behavior: Behavior normal.         Thought Content: Thought content normal.         Judgment: Judgment normal.         Assessment/Plan:  1. Hospital discharge follow-up  - [place] DISCHARGE MEDS RECONCILED W/ CURRENT OUTPATIENT MED LIST    2. Left carotid bruit  - US CAROTID ARTERY BILATERAL; Future    3. Encounter for screening mammogram for malignant neoplasm of breast   - [facility] DIGITAL SCREEN W OR WO CAD BILATERAL; Future    4.  Type 2 diabetes mellitus with other ophthalmic complication, with long-term current use of insulin (HCC)  - Hemoglobin A1C; Future    5. Essential hypertension  - CBC Auto Differential; Future  - Comprehensive Metabolic Panel;  Future        Medical Decision Making: high complexity

## 2020-01-29 ASSESSMENT — ENCOUNTER SYMPTOMS
ALLERGIC/IMMUNOLOGIC NEGATIVE: 1
SHORTNESS OF BREATH: 1
GASTROINTESTINAL NEGATIVE: 1
EYES NEGATIVE: 1

## 2020-01-30 ENCOUNTER — READMISSION MANAGEMENT (OUTPATIENT)
Dept: CALL CENTER | Facility: HOSPITAL | Age: 60
End: 2020-01-30

## 2020-01-30 NOTE — OUTREACH NOTE
CHF Week 2 Survey      Responses   Facility patient discharged from?  [facility]   Does the patient have one of the following disease processes/diagnoses(primary or secondary)?  CHF   Week 2 attempt successful?  Yes   Call start time  1224   Call end time  1227   Discharge diagnosis  Respiratory distress,  acute hypoxic respiratory failure likely from acute diastolic heart failure from fluid overload and poorly controlled hypertension   Meds reviewed with patient/caregiver?  Yes   Is the patient having any side effects they believe may be caused by any medication additions or changes?  No   Does the patient have all medications ordered at discharge?  Yes   Is the patient taking all medications as directed (includes completed medication regime)?  Yes   Does the patient have a primary care provider?   Yes   Does the patient have an appointment with their PCP within 7 days of discharge?  Yes   Has the patient kept scheduled appointments due by today?  Yes   Has home health visited the patient within 72 hours of discharge?  N/A   Psychosocial issues?  No   Did the patient receive a copy of their discharge instructions?  Yes   Nursing interventions  Reviewed instructions with patient   What is the patient's perception of their health status since discharge?  Improving   Nursing interventions  Nurse provided patient education   Is the patient weighing daily?  Yes   Does the patient have scales?  Yes   Daily weight interventions  Education provided on importance of daily weight   Is the patient able to teach back Heart Failure diet management?  Yes   Is the patient able to teach back Heart Failure Zones?  Yes   Is the patient able to teach back signs and symptoms of worsening condition? (i.e. weight gain, shortness of air, etc.)  Yes   If the patient is a current smoker, are they able to teach back resources for cessation?  [id number]   Is the patient/caregiver able to teach back the hierarchy of who to call/visit for  symptoms/problems? PCP, Specialist, Home health nurse, Urgent Care, ED, 911  Yes   Additional teach back comments  She weighs daily, still smokes, has seen PCP.    CHF Week 2 call completed?  Yes          Delaney Lester RN

## 2020-02-07 ENCOUNTER — READMISSION MANAGEMENT (OUTPATIENT)
Dept: CALL CENTER | Facility: HOSPITAL | Age: 60
End: 2020-02-07

## 2020-02-07 ENCOUNTER — APPOINTMENT (OUTPATIENT)
Dept: GENERAL RADIOLOGY | Facility: HOSPITAL | Age: 60
End: 2020-02-07

## 2020-02-07 ENCOUNTER — HOSPITAL ENCOUNTER (INPATIENT)
Facility: HOSPITAL | Age: 60
LOS: 2 days | Discharge: HOME | End: 2020-02-09
Attending: EMERGENCY MEDICINE | Admitting: FAMILY MEDICINE

## 2020-02-07 DIAGNOSIS — E87.70 HYPERVOLEMIA, UNSPECIFIED HYPERVOLEMIA TYPE: ICD-10-CM

## 2020-02-07 DIAGNOSIS — E87.5 HYPERKALEMIA: ICD-10-CM

## 2020-02-07 DIAGNOSIS — N18.6 END STAGE RENAL DISEASE (HCC): Primary | ICD-10-CM

## 2020-02-07 LAB
ALBUMIN SERPL-MCNC: 4.2 G/DL (ref 3.5–5.2)
ALBUMIN/GLOB SERPL: 1.3 G/DL
ALP SERPL-CCNC: 63 U/L (ref 39–117)
ALT SERPL W P-5'-P-CCNC: 9 U/L (ref 1–33)
ANION GAP SERPL CALCULATED.3IONS-SCNC: 20 MMOL/L (ref 5–15)
AST SERPL-CCNC: 16 U/L (ref 1–32)
BACTERIA UR QL AUTO: ABNORMAL /HPF
BASOPHILS # BLD AUTO: 0.02 10*3/MM3 (ref 0–0.2)
BASOPHILS NFR BLD AUTO: 0.3 % (ref 0–1.5)
BILIRUB SERPL-MCNC: 0.3 MG/DL (ref 0.2–1.2)
BILIRUB UR QL STRIP: NEGATIVE
BUN BLD-MCNC: 70 MG/DL (ref 8–23)
BUN/CREAT SERPL: 6.3 (ref 7–25)
CALCIUM SPEC-SCNC: 9.4 MG/DL (ref 8.6–10.5)
CHLORIDE SERPL-SCNC: 95 MMOL/L (ref 98–107)
CLARITY UR: CLEAR
CO2 SERPL-SCNC: 22 MMOL/L (ref 22–29)
COLOR UR: YELLOW
CREAT BLD-MCNC: 11.17 MG/DL (ref 0.57–1)
D-LACTATE SERPL-SCNC: 1.5 MMOL/L (ref 0.5–2)
DEPRECATED RDW RBC AUTO: 54.6 FL (ref 37–54)
EOSINOPHIL # BLD AUTO: 0.26 10*3/MM3 (ref 0–0.4)
EOSINOPHIL NFR BLD AUTO: 4 % (ref 0.3–6.2)
ERYTHROCYTE [DISTWIDTH] IN BLOOD BY AUTOMATED COUNT: 15.4 % (ref 12.3–15.4)
FLUAV AG NPH QL: NEGATIVE
FLUBV AG NPH QL IA: NEGATIVE
GFR SERPL CREATININE-BSD FRML MDRD: 3 ML/MIN/1.73
GFR SERPL CREATININE-BSD FRML MDRD: ABNORMAL ML/MIN/{1.73_M2}
GLOBULIN UR ELPH-MCNC: 3.2 GM/DL
GLUCOSE BLD-MCNC: 121 MG/DL (ref 65–99)
GLUCOSE BLDC GLUCOMTR-MCNC: 315 MG/DL (ref 70–130)
GLUCOSE BLDC GLUCOMTR-MCNC: 97 MG/DL (ref 70–130)
GLUCOSE UR STRIP-MCNC: ABNORMAL MG/DL
HCT VFR BLD AUTO: 29.2 % (ref 34–46.6)
HGB BLD-MCNC: 9.5 G/DL (ref 12–15.9)
HGB UR QL STRIP.AUTO: ABNORMAL
HOLD SPECIMEN: NORMAL
HOLD SPECIMEN: NORMAL
HYALINE CASTS UR QL AUTO: ABNORMAL /LPF
IMM GRANULOCYTES # BLD AUTO: 0.01 10*3/MM3 (ref 0–0.05)
IMM GRANULOCYTES NFR BLD AUTO: 0.2 % (ref 0–0.5)
KETONES UR QL STRIP: ABNORMAL
LEUKOCYTE ESTERASE UR QL STRIP.AUTO: NEGATIVE
LYMPHOCYTES # BLD AUTO: 1.33 10*3/MM3 (ref 0.7–3.1)
LYMPHOCYTES NFR BLD AUTO: 20.7 % (ref 19.6–45.3)
MCH RBC QN AUTO: 31.8 PG (ref 26.6–33)
MCHC RBC AUTO-ENTMCNC: 32.5 G/DL (ref 31.5–35.7)
MCV RBC AUTO: 97.7 FL (ref 79–97)
MONOCYTES # BLD AUTO: 0.4 10*3/MM3 (ref 0.1–0.9)
MONOCYTES NFR BLD AUTO: 6.2 % (ref 5–12)
NEUTROPHILS # BLD AUTO: 4.41 10*3/MM3 (ref 1.7–7)
NEUTROPHILS NFR BLD AUTO: 68.6 % (ref 42.7–76)
NITRITE UR QL STRIP: NEGATIVE
NRBC BLD AUTO-RTO: 0 /100 WBC (ref 0–0.2)
PH UR STRIP.AUTO: 8.5 [PH] (ref 5–8)
PLATELET # BLD AUTO: 171 10*3/MM3 (ref 140–450)
PMV BLD AUTO: 9.4 FL (ref 6–12)
POTASSIUM BLD-SCNC: 6.5 MMOL/L (ref 3.5–5.2)
PROCALCITONIN SERPL-MCNC: 0.24 NG/ML (ref 0.1–0.25)
PROT SERPL-MCNC: 7.4 G/DL (ref 6–8.5)
PROT UR QL STRIP: ABNORMAL
RBC # BLD AUTO: 2.99 10*6/MM3 (ref 3.77–5.28)
RBC # UR: ABNORMAL /HPF
REF LAB TEST METHOD: ABNORMAL
SODIUM BLD-SCNC: 137 MMOL/L (ref 136–145)
SP GR UR STRIP: 1.02 (ref 1–1.03)
SQUAMOUS #/AREA URNS HPF: ABNORMAL /HPF
UROBILINOGEN UR QL STRIP: ABNORMAL
WBC NRBC COR # BLD: 6.43 10*3/MM3 (ref 3.4–10.8)
WBC UR QL AUTO: ABNORMAL /HPF
WHOLE BLOOD HOLD SPECIMEN: NORMAL
WHOLE BLOOD HOLD SPECIMEN: NORMAL

## 2020-02-07 PROCEDURE — 82962 GLUCOSE BLOOD TEST: CPT

## 2020-02-07 PROCEDURE — 94799 UNLISTED PULMONARY SVC/PX: CPT

## 2020-02-07 PROCEDURE — 71045 X-RAY EXAM CHEST 1 VIEW: CPT

## 2020-02-07 PROCEDURE — 93005 ELECTROCARDIOGRAM TRACING: CPT | Performed by: NURSE PRACTITIONER

## 2020-02-07 PROCEDURE — 93010 ELECTROCARDIOGRAM REPORT: CPT | Performed by: INTERNAL MEDICINE

## 2020-02-07 PROCEDURE — 36415 COLL VENOUS BLD VENIPUNCTURE: CPT

## 2020-02-07 PROCEDURE — 63710000001 INSULIN LISPRO (HUMAN) PER 5 UNITS: Performed by: FAMILY MEDICINE

## 2020-02-07 PROCEDURE — 94640 AIRWAY INHALATION TREATMENT: CPT

## 2020-02-07 PROCEDURE — 63710000001 INSULIN REGULAR HUMAN PER 5 UNITS: Performed by: EMERGENCY MEDICINE

## 2020-02-07 PROCEDURE — 80053 COMPREHEN METABOLIC PANEL: CPT | Performed by: EMERGENCY MEDICINE

## 2020-02-07 PROCEDURE — 87804 INFLUENZA ASSAY W/OPTIC: CPT | Performed by: EMERGENCY MEDICINE

## 2020-02-07 PROCEDURE — 84145 PROCALCITONIN (PCT): CPT | Performed by: EMERGENCY MEDICINE

## 2020-02-07 PROCEDURE — 85025 COMPLETE CBC W/AUTO DIFF WBC: CPT | Performed by: EMERGENCY MEDICINE

## 2020-02-07 PROCEDURE — P9612 CATHETERIZE FOR URINE SPEC: HCPCS

## 2020-02-07 PROCEDURE — 83605 ASSAY OF LACTIC ACID: CPT | Performed by: EMERGENCY MEDICINE

## 2020-02-07 PROCEDURE — G0378 HOSPITAL OBSERVATION PER HR: HCPCS

## 2020-02-07 PROCEDURE — 99284 EMERGENCY DEPT VISIT MOD MDM: CPT

## 2020-02-07 PROCEDURE — 63710000001 INSULIN DETEMIR PER 5 UNITS: Performed by: FAMILY MEDICINE

## 2020-02-07 PROCEDURE — 87040 BLOOD CULTURE FOR BACTERIA: CPT | Performed by: EMERGENCY MEDICINE

## 2020-02-07 PROCEDURE — 93005 ELECTROCARDIOGRAM TRACING: CPT | Performed by: EMERGENCY MEDICINE

## 2020-02-07 PROCEDURE — 81001 URINALYSIS AUTO W/SCOPE: CPT | Performed by: EMERGENCY MEDICINE

## 2020-02-07 RX ORDER — DORZOLAMIDE HCL 20 MG/ML
1 SOLUTION/ DROPS OPHTHALMIC 2 TIMES DAILY
Status: DISCONTINUED | OUTPATIENT
Start: 2020-02-07 | End: 2020-02-09 | Stop reason: HOSPADM

## 2020-02-07 RX ORDER — PRAVASTATIN SODIUM 20 MG
10 TABLET ORAL NIGHTLY
Status: DISCONTINUED | OUTPATIENT
Start: 2020-02-07 | End: 2020-02-09 | Stop reason: HOSPADM

## 2020-02-07 RX ORDER — SODIUM POLYSTYRENE SULFONATE 15 G/60ML
15 SUSPENSION ORAL; RECTAL ONCE
Status: COMPLETED | OUTPATIENT
Start: 2020-02-07 | End: 2020-02-07

## 2020-02-07 RX ORDER — TIMOLOL MALEATE 5 MG/ML
1 SOLUTION/ DROPS OPHTHALMIC EVERY MORNING
Status: DISCONTINUED | OUTPATIENT
Start: 2020-02-08 | End: 2020-02-09 | Stop reason: HOSPADM

## 2020-02-07 RX ORDER — CARVEDILOL 6.25 MG/1
12.5 TABLET ORAL 2 TIMES DAILY WITH MEALS
Status: DISCONTINUED | OUTPATIENT
Start: 2020-02-07 | End: 2020-02-09 | Stop reason: HOSPADM

## 2020-02-07 RX ORDER — IPRATROPIUM BROMIDE AND ALBUTEROL SULFATE 2.5; .5 MG/3ML; MG/3ML
3 SOLUTION RESPIRATORY (INHALATION) EVERY 4 HOURS PRN
Status: DISCONTINUED | OUTPATIENT
Start: 2020-02-07 | End: 2020-02-09 | Stop reason: HOSPADM

## 2020-02-07 RX ORDER — BRIMONIDINE TARTRATE 2 MG/ML
1 SOLUTION/ DROPS OPHTHALMIC 3 TIMES DAILY
Status: DISCONTINUED | OUTPATIENT
Start: 2020-02-07 | End: 2020-02-09 | Stop reason: HOSPADM

## 2020-02-07 RX ORDER — FAMOTIDINE 20 MG/1
20 TABLET, FILM COATED ORAL 2 TIMES DAILY
Status: DISCONTINUED | OUTPATIENT
Start: 2020-02-07 | End: 2020-02-08

## 2020-02-07 RX ORDER — SODIUM CHLORIDE 0.9 % (FLUSH) 0.9 %
10 SYRINGE (ML) INJECTION EVERY 12 HOURS SCHEDULED
Status: DISCONTINUED | OUTPATIENT
Start: 2020-02-07 | End: 2020-02-09 | Stop reason: HOSPADM

## 2020-02-07 RX ORDER — MELATONIN
2000 DAILY
Status: DISCONTINUED | OUTPATIENT
Start: 2020-02-07 | End: 2020-02-09 | Stop reason: HOSPADM

## 2020-02-07 RX ORDER — CALCITRIOL 0.25 UG/1
0.25 CAPSULE, LIQUID FILLED ORAL TAKE AS DIRECTED
Status: DISCONTINUED | OUTPATIENT
Start: 2020-02-07 | End: 2020-02-09 | Stop reason: HOSPADM

## 2020-02-07 RX ORDER — ONDANSETRON 2 MG/ML
4 INJECTION INTRAMUSCULAR; INTRAVENOUS EVERY 6 HOURS PRN
Status: DISCONTINUED | OUTPATIENT
Start: 2020-02-07 | End: 2020-02-09 | Stop reason: HOSPADM

## 2020-02-07 RX ORDER — SEVELAMER CARBONATE 800 MG/1
800 TABLET, FILM COATED ORAL
Status: DISCONTINUED | OUTPATIENT
Start: 2020-02-07 | End: 2020-02-09 | Stop reason: HOSPADM

## 2020-02-07 RX ORDER — DEXTROSE MONOHYDRATE 25 G/50ML
25 INJECTION, SOLUTION INTRAVENOUS ONCE
Status: COMPLETED | OUTPATIENT
Start: 2020-02-07 | End: 2020-02-07

## 2020-02-07 RX ORDER — SODIUM CHLORIDE 0.9 % (FLUSH) 0.9 %
10 SYRINGE (ML) INJECTION AS NEEDED
Status: DISCONTINUED | OUTPATIENT
Start: 2020-02-07 | End: 2020-02-09 | Stop reason: HOSPADM

## 2020-02-07 RX ORDER — NICOTINE POLACRILEX 4 MG
15 LOZENGE BUCCAL
Status: DISCONTINUED | OUTPATIENT
Start: 2020-02-07 | End: 2020-02-09 | Stop reason: HOSPADM

## 2020-02-07 RX ORDER — ASPIRIN 81 MG/1
81 TABLET ORAL DAILY
Status: DISCONTINUED | OUTPATIENT
Start: 2020-02-07 | End: 2020-02-09 | Stop reason: HOSPADM

## 2020-02-07 RX ORDER — ACETAMINOPHEN 500 MG
1000 TABLET ORAL ONCE
Status: COMPLETED | OUTPATIENT
Start: 2020-02-07 | End: 2020-02-07

## 2020-02-07 RX ORDER — LOSARTAN POTASSIUM 50 MG/1
50 TABLET ORAL DAILY
Status: DISCONTINUED | OUTPATIENT
Start: 2020-02-07 | End: 2020-02-09 | Stop reason: HOSPADM

## 2020-02-07 RX ORDER — LATANOPROST 50 UG/ML
1 SOLUTION/ DROPS OPHTHALMIC NIGHTLY
Status: DISCONTINUED | OUTPATIENT
Start: 2020-02-07 | End: 2020-02-09 | Stop reason: HOSPADM

## 2020-02-07 RX ORDER — DEXTROSE MONOHYDRATE 25 G/50ML
25 INJECTION, SOLUTION INTRAVENOUS
Status: DISCONTINUED | OUTPATIENT
Start: 2020-02-07 | End: 2020-02-09 | Stop reason: HOSPADM

## 2020-02-07 RX ORDER — ALBUTEROL SULFATE 1.25 MG/3ML
1.25 SOLUTION RESPIRATORY (INHALATION) ONCE
Status: COMPLETED | OUTPATIENT
Start: 2020-02-07 | End: 2020-02-07

## 2020-02-07 RX ORDER — SERTRALINE HYDROCHLORIDE 25 MG/1
25 TABLET, FILM COATED ORAL DAILY
Status: DISCONTINUED | OUTPATIENT
Start: 2020-02-07 | End: 2020-02-09 | Stop reason: HOSPADM

## 2020-02-07 RX ADMIN — CARVEDILOL 12.5 MG: 6.25 TABLET, FILM COATED ORAL at 20:26

## 2020-02-07 RX ADMIN — LATANOPROST 1 DROP: 50 SOLUTION OPHTHALMIC at 20:25

## 2020-02-07 RX ADMIN — VITAMIN D 2000 UNITS: 25 TAB ORAL at 20:26

## 2020-02-07 RX ADMIN — SERTRALINE HYDROCHLORIDE 25 MG: 25 TABLET ORAL at 20:26

## 2020-02-07 RX ADMIN — INSULIN HUMAN 8 UNITS: 100 INJECTION, SOLUTION PARENTERAL at 16:10

## 2020-02-07 RX ADMIN — LOSARTAN POTASSIUM 50 MG: 50 TABLET, FILM COATED ORAL at 20:26

## 2020-02-07 RX ADMIN — INSULIN LISPRO 5 UNITS: 100 INJECTION, SOLUTION INTRAVENOUS; SUBCUTANEOUS at 18:48

## 2020-02-07 RX ADMIN — SODIUM CHLORIDE, PRESERVATIVE FREE 10 ML: 5 INJECTION INTRAVENOUS at 20:27

## 2020-02-07 RX ADMIN — PRAVASTATIN SODIUM 10 MG: 20 TABLET ORAL at 20:25

## 2020-02-07 RX ADMIN — SODIUM POLYSTYRENE SULFONATE 15 G: 15 SUSPENSION ORAL; RECTAL at 18:48

## 2020-02-07 RX ADMIN — IPRATROPIUM BROMIDE AND ALBUTEROL SULFATE 3 ML: 2.5; .5 SOLUTION RESPIRATORY (INHALATION) at 21:45

## 2020-02-07 RX ADMIN — INSULIN DETEMIR 10 UNITS: 100 INJECTION, SOLUTION SUBCUTANEOUS at 21:58

## 2020-02-07 RX ADMIN — DEXTROSE 25 G: 50 INJECTION, SOLUTION INTRAVENOUS at 16:11

## 2020-02-07 RX ADMIN — FAMOTIDINE 20 MG: 20 TABLET, FILM COATED ORAL at 21:58

## 2020-02-07 RX ADMIN — ALBUTEROL SULFATE 1.25 MG: 1.25 SOLUTION RESPIRATORY (INHALATION) at 16:13

## 2020-02-07 RX ADMIN — ACETAMINOPHEN 1000 MG: 500 TABLET, FILM COATED ORAL at 14:37

## 2020-02-07 RX ADMIN — BRIMONIDINE TARTRATE 1 DROP: 2 SOLUTION OPHTHALMIC at 21:58

## 2020-02-07 RX ADMIN — SEVELAMER CARBONATE 800 MG: 800 TABLET, FILM COATED ORAL at 20:25

## 2020-02-07 NOTE — ED NOTES
Encouraged pt to void for specimen.  Pt unable to void at this time     Benitez Javier RN  02/07/20 1454

## 2020-02-07 NOTE — ED PROVIDER NOTES
Subjective   Chills at home not feeling well uremic patient      Chills   Location:  Generalized  Severity:  Moderate  Onset quality:  Gradual  Timing:  Constant  Progression:  Worsening  Chronicity:  New  Associated symptoms: fever and wheezing    Associated symptoms: no abdominal pain, no chest pain, no congestion, no cough, no diarrhea, no headaches, no loss of consciousness, no myalgias, no rash, no rhinorrhea and no shortness of breath        Review of Systems   Constitutional: Positive for chills and fever.   HENT: Negative.  Negative for congestion and rhinorrhea.    Eyes: Negative.    Respiratory: Positive for wheezing. Negative for cough and shortness of breath.    Cardiovascular: Negative.  Negative for chest pain.   Gastrointestinal: Negative.  Negative for abdominal pain and diarrhea.   Musculoskeletal: Negative.  Negative for back pain, myalgias and neck pain.   Skin: Negative.  Negative for rash.   Neurological: Negative.  Negative for loss of consciousness and headaches.   All other systems reviewed and are negative.      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    • Chronic otitis media    • Diabetes (CMS/HCC)    • Eustachian tube dysfunction    • Glaucoma    • HTN (hypertension)    • Kidney failure     on dialysis   • Liver disorder    • Mucoid otitis media        Allergies   Allergen Reactions   • Bupropion Nausea Only   • Chantix [Varenicline]    • Sulfa Antibiotics    • Azithromycin Rash   • Levofloxacin Rash   • Penicillins Rash       Past Surgical History:   Procedure Laterality Date   • ARTERIOVENOUS FISTULA     • CATARACT EXTRACTION WITH INTRAOCULAR LENS IMPLANT     •  SECTION     • CHOLECYSTECTOMY     • MYRINGOTOMY W/ TUBES Bilateral    • MYRINGOTOMY W/ TUBES Right    • TUBAL ABDOMINAL LIGATION         Family History   Problem Relation Age of Onset   • Heart disease Mother        Social History     Socioeconomic History   • Marital  status:      Spouse name: Not on file   • Number of children: Not on file   • Years of education: Not on file   • Highest education level: Not on file   Tobacco Use   • Smoking status: Current Every Day Smoker     Packs/day: 0.50     Years: 6.00     Pack years: 3.00     Types: Cigarettes   • Smokeless tobacco: Never Used   Substance and Sexual Activity   • Alcohol use: No   • Drug use: No   • Sexual activity: Defer           Objective   Physical Exam   Constitutional: She is oriented to person, place, and time. She appears well-developed and well-nourished.   Pale-appearing   HENT:   Head: Normocephalic and atraumatic.   Eyes: Pupils are equal, round, and reactive to light. Conjunctivae and EOM are normal.   Neck: Normal range of motion. Neck supple.   Cardiovascular: Normal rate, regular rhythm, normal heart sounds and intact distal pulses.   Pulmonary/Chest: Effort normal and breath sounds normal.   Abdominal: Soft. Bowel sounds are normal.   Musculoskeletal: Normal range of motion.   Neurological: She is alert and oriented to person, place, and time. She has normal reflexes.   Skin: Skin is warm and dry.   Psychiatric: She has a normal mood and affect.   Nursing note and vitals reviewed.      Procedures           ED Course  ED Course as of Feb 07 1633   Fri Feb 07, 2020   1349 • **Respiratory distress; acute hypoxic respiratory failure likely from acute diastolic heart failure from fluid overload and poorly controlled hypertension  • Acute diastolic heart failure (CMS/HCC)  • Lymph node enlargement, left axillary measuring 1.1 cm -follow-up for primary care  • Anemia due to chronic kidney disease, on chronic dialysis (CMS/[facility])  • Non-compliance with renal dialysis (CMS/HCC)  • Elevated troponin due to ESRD   • Type 2 diabetes mellitus, with long-term current use of insulin (CMS/HCC); A1c is approximately 5  • End stage renal failure on dialysis (CMS/HCC)  • Diabetic nephropathy (CMS/HCC)  • HTN  (hypertension)         [TS]   1631 Case discussed with the patient and will treat her hyperkalemia the patient cannot get sedation have to be admitted to get dialyzed    [TS]      ED Course User Index  [TS] Maverick Edwards MD                      [place] Coma Scale Score: 15                          MDM    Final diagnoses:   End stage renal disease (CMS/HCC)   Hyperkalemia   Hypervolemia, unspecified hypervolemia type            Maverick Edwards MD  02/07/20 5679

## 2020-02-07 NOTE — H&P
[facility] Medicine Services  HISTORY AND PHYSICAL    Date of Admission: 2/7/2020  Primary Care Physician: Provider, No Known    Subjective     Chief Complaint: Hyperkalemia.  End-stage renal failure.  Fluid overload    History of Present Illness  Patient is a 59-year-old  female came in complaining chills with possible uremic syndrome from missing dialysis.  Labs shows hyperkalemia.  Patient missed dialysis on Tuesday and Thursday.  Patient also complained chills sometimes since Tuesday.  Patient denies any fever.  Patient denies any chest pain or shortness of breath.  Patient with complaint of generalized weakness.     Review of Systems   Constitutional: Positive for activity change, appetite change, chills and fatigue. Negative for fever.   HENT: Negative for hearing loss, nosebleeds, tinnitus and trouble swallowing.    Eyes: Negative for visual disturbance.   Respiratory: Negative for cough, chest tightness, shortness of breath and wheezing.    Cardiovascular: Negative for chest pain, palpitations and leg swelling.   Gastrointestinal: Negative for abdominal distention, abdominal pain, blood in stool, constipation, diarrhea, nausea and vomiting.   Endocrine: Negative for cold intolerance, heat intolerance, polydipsia, polyphagia and polyuria.   Genitourinary: Negative for decreased urine volume, difficulty urinating, dysuria, flank pain, frequency and hematuria.   Musculoskeletal: Positive for arthralgias, gait problem and myalgias. Negative for joint swelling.   Skin: Negative for rash.   Allergic/Immunologic: Negative for immunocompromised state.   Neurological: Positive for weakness. Negative for dizziness, syncope, light-headedness and headaches.   Hematological: Negative for adenopathy. Does not bruise/bleed easily.   Psychiatric/Behavioral: Negative for confusion and sleep disturbance. The patient is not nervous/anxious.         Otherwise complete ROS reviewed  and negative except as mentioned in the HPI.      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    Social History:  reports that she has been smoking cigarettes. She has a 3.00 pack-year smoking history. She has never used smokeless tobacco. She reports that she does not drink alcohol or use drugs.    Medications:  Prior to Admission medications    Medication Sig Start Date End Date Taking? Authorizing Provider   albuterol sulfate  (90 Base) MCG/ACT inhaler Inhale 2 puffs Every 4 (Four) Hours As Needed for Wheezing. 20  Yes Sundeep Aldridge MD   aspirin 81 MG EC tablet Take 81 mg by mouth Daily.   Yes ProviderSteve MD   brimonidine (ALPHAGAN) 0.2 % ophthalmic solution Administer 1 drop into the left eye 3 (Three) Times a Day.   Yes Steve Warren MD   calcitriol (ROCALTROL) 0.25 MCG capsule Take 0.25 mcg by mouth Take As Directed. Only given on dialysis days (Tuesday, Thursday, Saturday)   Yes Steve Warren MD   carvedilol (COREG) 12.5 MG tablet Take 1 tablet by mouth 2 (Two) Times a Day With Meals. 20  Yes Sundeep Aldridge MD   cetirizine (zyrTEC) 10 MG tablet Take 10 mg by mouth At Night As Needed (allergies).   Yes Steve Warren MD   cholecalciferol (VITAMIN D3) 1000 units tablet Take 2,000 Units by mouth  Daily.   Yes Steve Warren MD   dorzolamide (TRUSOPT) 2 % ophthalmic solution Administer 1 drop into the left eye 2 (Two) Times a Day.   Yes Steve Warren MD   famotidine (PEPCID) 20 MG tablet Take 20 mg by mouth 2 (Two) Times a Day.   Yes Steve Warren MD   fluticasone (FLONASE) 50 MCG/ACT nasal spray 2 sprays into the nostril(s) as directed by provider 2 (Two) Times a Day.   Yes Steve Warren MD   homatropine 5 % ophthalmic solution Administer 1 drop into the left eye Daily.   Yes Steve Warren MD   insulin glargine (LANTUS) 100 UNIT/ML injection Inject 10 Units under the skin Every Night.   Yes Steve Warren MD   lidocaine-prilocaine (EMLA) 2.5-2.5 % cream Apply 1 application topically to the appropriate area as directed Take As Directed. Apply to access area 30 minutes prior to dialysis.    Yes Steve Warren MD   losartan (COZAAR) 50 MG tablet Take 50 mg by mouth Daily.   Yes Steve Warren MD   midodrine (PROAMATINE) 5 MG tablet Take 5 mg by mouth Take As Directed. as needed for low blood pressure during dialysis.   Yes Steve Warren MD   Netarsudil Dimesylate (RHOPRESSA) 0.02 % solution Administer 1 drop into the left eye Every Night.   Yes Steve Warren MD   pravastatin (PRAVACHOL) 10 MG tablet Take 10 mg by mouth Every Night.   Yes Steve Warren MD   sertraline (ZOLOFT) 25 MG tablet Take 25 mg by mouth Daily.   Yes Steve Warren MD   sevelamer (RENAGEL) 800 MG tablet Take 2,400 mg by mouth 3 (Three) Times a Day With Meals.   Yes Steve Warren MD   timolol (TIMOPTIC) 0.5 % ophthalmic solution Administer 1 drop into the left eye Every Morning.   Yes Steve Warren MD   travoprost, BAK free, (TRAVATAN Z) 0.004 % solution ophthalmic solution Administer 1 drop into the left eye Every Night.   Yes Steve Warren MD   nicotine (NICODERM CQ) 21 MG/24HR patch Place 1 patch on the skin as directed by  "provider Every Night. 1/21/20   Sundeep Aldridge MD     Allergies:  Allergies   Allergen Reactions   • Bupropion Nausea Only   • Chantix [Varenicline]    • Sulfa Antibiotics    • Azithromycin Rash   • Levofloxacin Rash   • Penicillins Rash       Objective     Vital Signs: /97   Pulse 98   Temp 98.2 °F (36.8 °C) (Oral)   Resp 15   Ht 149.9 cm (59\")   Wt 61.7 kg (136 lb)   SpO2 100%   BMI 27.47 kg/m²   Physical Exam   Constitutional: She is oriented to person, place, and time. She appears well-developed.   HENT:   Head: Normocephalic.   Eyes: Pupils are equal, round, and reactive to light. Conjunctivae are normal.   Neck: Neck supple. No JVD present.   Cardiovascular: Normal rate, regular rhythm, normal heart sounds and intact distal pulses. Exam reveals no gallop and no friction rub.   No murmur heard.  Pulmonary/Chest: Effort normal and breath sounds normal. No respiratory distress. She has no wheezes. She has no rales. She exhibits no tenderness.   Abdominal: Soft. Bowel sounds are normal. She exhibits no distension. There is no tenderness. There is no rebound and no guarding.   Musculoskeletal: She exhibits no edema, tenderness or deformity.   Neurological: She is alert and oriented to person, place, and time. She displays normal reflexes. No cranial nerve deficit. She exhibits abnormal muscle tone. Coordination abnormal.   Skin: Skin is warm and dry. Capillary refill takes 2 to 3 seconds. No rash noted.   Psychiatric: She has a normal mood and affect. Her behavior is normal.   Nursing note and vitals reviewed.          Results Reviewed:    Lab Results (last 24 hours)     Procedure Component Value Units Date/Time    Procalcitonin [[id number]]  (Normal) Collected:  02/07/20 1502    Specimen:  Blood from Arm, Right Updated:  02/07/20 1547     Procalcitonin 0.24 ng/mL     Narrative:       As a Marker for Sepsis (Non-Neonates):   1. <0.5 ng/mL represents a low risk of severe sepsis and/or septic " "shock.  1. >2 ng/mL represents a high risk of severe sepsis and/or septic shock.    As a Marker for Lower Respiratory Tract Infections that require antibiotic therapy:  PCT on Admission     Antibiotic Therapy             6-12 Hrs later  > 0.5                Strongly Recommended            >0.25 - <0.5         Recommended  0.1 - 0.25           Discouraged                   Remeasure/reassess PCT  <0.1                 Strongly Discouraged          Remeasure/reassess PCT      As 28 day mortality risk marker: \"Change in Procalcitonin Result\" (> 80 % or <=80 %) if Day 0 (or Day 1) and Day 4 values are available. Refer to http://www.Hairbobopct-calculator.com/   Change in PCT <=80 %   A decrease of PCT levels below or equal to 80 % defines a positive change in PCT test result representing a higher risk for 28-day all-cause mortality of patients diagnosed with severe sepsis or septic shock.  Change in PCT > 80 %   A decrease of PCT levels of more than 80 % defines a negative change in PCT result representing a lower risk for 28-day all-cause mortality of patients diagnosed with severe sepsis or septic shock.                Results may be falsely decreased if patient taking Biotin.     [place] Draw [[id number]] Collected:  02/07/20 1419    Specimen:  Blood from Arm, Right Updated:  02/07/20 1531    Narrative:       The following orders were created for panel order [place] Draw.  Procedure                               Abnormality         Status                     ---------                               -----------         ------                     Light Blue Top[[id number]]                                   Final result               Green Top (Gel)[[id number]]                                  Final result               Lavender Top[[id number]]                                     Final result               Red Top[[id number]]                                          Final result                 Please view results for these " tests on the individual orders.    Light Blue Top [[id number]] Collected:  02/07/20 1419    Specimen:  Blood from Arm, Right Updated:  02/07/20 1531     Extra Tube hold for add-on     Comment: Auto resulted       Green Top (Gel) [[id number]] Collected:  02/07/20 1419    Specimen:  Blood from Arm, Right Updated:  02/07/20 1531     Extra Tube Hold for add-ons.     Comment: Auto resulted.       Lavender Top [[id number]] Collected:  02/07/20 1419    Specimen:  Blood from Arm, Right Updated:  02/07/20 1531     Extra Tube hold for add-on     Comment: Auto resulted       Red Top [[id number]] Collected:  02/07/20 1419    Specimen:  Blood from Arm, Right Updated:  02/07/20 1531     Extra Tube Hold for add-ons.     Comment: Auto resulted.       Urinalysis With Culture If Indicated - Urine, Catheter [[id number]]  (Abnormal) Collected:  02/07/20 1506    Specimen:  Urine, Catheter Updated:  02/07/20 1531     Color, UA Yellow     Appearance, UA Clear     pH, UA 8.5     Specific Gravity, UA 1.017     Glucose,  mg/dL (1+)     Ketones, UA Trace     Bilirubin, UA Negative     Blood, UA Trace     Protein, UA >=300 mg/dL (3+)     Leuk Esterase, UA Negative     Nitrite, UA Negative     Urobilinogen, UA 0.2 E.U./dL    Urinalysis, Microscopic Only - Urine, Catheter [[id number]]  (Abnormal) Collected:  02/07/20 1506    Specimen:  Urine, Catheter Updated:  02/07/20 1531     RBC, UA 6-12 /HPF      WBC, UA 0-2 /HPF      Bacteria, UA None Seen /HPF      Squamous Epithelial Cells, UA 0-2 /HPF      Hyaline Casts, UA None Seen /LPF      Methodology Automated Microscopy    Comprehensive Metabolic Panel [[id number]]  (Abnormal) Collected:  02/07/20 1502    Specimen:  Blood from Arm, Right Updated:  02/07/20 1525     Glucose 121 mg/dL      BUN 70 mg/dL      Creatinine 11.17 mg/dL      Sodium 137 mmol/L      Potassium 6.5 mmol/L      Chloride 95 mmol/L      CO2 22.0 mmol/L      Calcium 9.4 mg/dL      Total Protein 7.4 g/dL      Albumin 4.20 g/dL       ALT (SGPT) 9 U/L      AST (SGOT) 16 U/L      Comment: Specimen hemolyzed.  Results may be affected.        Alkaline Phosphatase 63 U/L      Total Bilirubin 0.3 mg/dL      eGFR Non African Amer 3 mL/min/1.73      Comment: <15 Indicative of kidney failure.        eGFR   Amer --     Comment: <15 Indicative of kidney failure.        Globulin 3.2 gm/dL      A/G Ratio 1.3 g/dL      BUN/Creatinine Ratio 6.3     Anion Gap 20.0 mmol/L     Narrative:       GFR Normal >60  Chronic Kidney Disease <60  Kidney Failure <15      Lactic Acid, Plasma [[id number]]  (Normal) Collected:  02/07/20 1419    Specimen:  Blood from Arm, Right Updated:  02/07/20 1447     Lactate 1.5 mmol/L     Blood Culture - Blood, Arm, Right [[id number]] Collected:  02/07/20 1415    Specimen:  Blood from Arm, Right Updated:  02/07/20 1447    Blood Culture - Blood, Arm, Right [[id number]] Collected:  02/07/20 1422    Specimen:  Blood from Arm, Right Updated:  02/07/20 1447    Influenza Antigen, Rapid - Swab, Nasopharynx [[id number]]  (Normal) Collected:  02/07/20 1404    Specimen:  Swab from Nasopharynx Updated:  02/07/20 1446     Influenza A Ag, EIA Negative     Influenza B Ag, EIA Negative    Narrative:       Recommend confirmation of negative results by viral culture or molecular assay.    CBC & Differential [[id number]] Collected:  02/07/20 1419    Specimen:  Blood from Arm, Right Updated:  02/07/20 1433    Narrative:       The following orders were created for panel order CBC & Differential.  Procedure                               Abnormality         Status                     ---------                               -----------         ------                     CBC Auto Differential[[id number]]        Abnormal            Final result                 Please view results for these tests on the individual orders.    CBC Auto Differential [[id number]]  (Abnormal) Collected:  02/07/20 1419    Specimen:  Blood from Arm, Right Updated:  02/07/20 1433     WBC  6.43 10*3/mm3      RBC 2.99 10*6/mm3      Hemoglobin 9.5 g/dL      Hematocrit 29.2 %      MCV 97.7 fL      MCH 31.8 pg      MCHC 32.5 g/dL      RDW 15.4 %      RDW-SD 54.6 fl      MPV 9.4 fL      Platelets 171 10*3/mm3      Neutrophil % 68.6 %      Lymphocyte % 20.7 %      Monocyte % 6.2 %      Eosinophil % 4.0 %      Basophil % 0.3 %      Immature Grans % 0.2 %      Neutrophils, Absolute 4.41 10*3/mm3      Lymphocytes, Absolute 1.33 10*3/mm3      Monocytes, Absolute 0.40 10*3/mm3      Eosinophils, Absolute 0.26 10*3/mm3      Basophils, Absolute 0.02 10*3/mm3      Immature Grans, Absolute 0.01 10*3/mm3      nRBC 0.0 /100 WBC            Radiology Data:    Imaging Results (Last 24 Hours)     Procedure Component Value Units Date/Time    XR Chest 1 View [[id number]] Collected:  02/07/20 1451     Updated:  02/07/20 1455    Narrative:       EXAMINATION: XR CHEST 1 VW- 2/7/2020 2:51 PM CST     HISTORY: Shortness of breath     COMPARISON: 01/19/2020     FINDINGS:  Heart is enlarged. Atherosclerotic calcifications are seen in the aorta.  Diffuse increased interstitial markings are noted with atelectasis in  the medial left lung base. Pulmonary vasculature is indistinct,  suggesting pulmonary vascular congestion. A vascular stent projects over  the left lung apex. There is no appreciable pneumothorax or pleural  effusion. Mild central bronchial wall thickening is noted.       Impression:       Cardiomegaly with findings suggestive of pulmonary vascular  congestion and pulmonary edema.  This report was finalized on 02/07/2020 14:52 by Dr. Raman Bush MD.          I have personally reviewed and interpreted the radiology studies and ECG obtained at time of admission.     Assessment / Plan      Assessment & Plan  Active Hospital Problems    Diagnosis   • End stage renal disease (CMS/HCC)   • Anemia due to chronic kidney disease, on chronic dialysis (CMS/[facility])   • Type 2 diabetes mellitus, with long-term current use of insulin  (CMS/[facility])   • End stage renal failure on dialysis (CMS/[facility])   • Diabetes (CMS/[facility])   • HTN (hypertension)     Plans    Fluid overload.    Hyperkalemia.  Kayexalate.    End-stage renal disease.  On dialysis.  Nephrology consult.  Patient used to get dialysis Tuesday Thursday and Saturday.  Patient has missed Tuesday and Thursday.  Continue calcitriol.  Continue vitamin D.  Continue Renagel.    Anemia.  Secondary to end-stage renal failure.      Diabetes.  Sliding scale.  Continue Levemir.    CAD/hypertension/hyperlipidemia.  Continue aspirin.  Continue Coreg.  Continue Cozaar.  Hold Midodrin.  Continue protocol.    Reflux.  Pepcid.  Zofran as needed.    Depression.  Continue Zoloft.    Code Status: full     I discussed the patient's findings and my recommendations with: patient    Estimated length of stay: 1-3 days.     Lyndon Lockett MD   02/07/20   5:10 PM

## 2020-02-07 NOTE — OUTREACH NOTE
CHF Week 3 Survey      Responses   Facility patient discharged from?  [place]   Does the patient have one of the following disease processes/diagnoses(primary or secondary)?  CHF   Week 3 attempt successful?  No   Unsuccessful attempts  Attempt 1 [pt currently in ER]          Delores Farias RN

## 2020-02-08 LAB
ALBUMIN SERPL-MCNC: 3.8 G/DL (ref 3.5–5.2)
ALBUMIN/GLOB SERPL: 1.3 G/DL
ALP SERPL-CCNC: 68 U/L (ref 39–117)
ALT SERPL W P-5'-P-CCNC: 16 U/L (ref 1–33)
ANION GAP SERPL CALCULATED.3IONS-SCNC: 22 MMOL/L (ref 5–15)
ANION GAP SERPL CALCULATED.3IONS-SCNC: 22 MMOL/L (ref 5–15)
AST SERPL-CCNC: 24 U/L (ref 1–32)
BASOPHILS # BLD AUTO: 0.01 10*3/MM3 (ref 0–0.2)
BASOPHILS NFR BLD AUTO: 0.2 % (ref 0–1.5)
BILIRUB SERPL-MCNC: 0.4 MG/DL (ref 0.2–1.2)
BUN BLD-MCNC: 72 MG/DL (ref 8–23)
BUN BLD-MCNC: 74 MG/DL (ref 8–23)
BUN/CREAT SERPL: 6 (ref 7–25)
BUN/CREAT SERPL: 6.5 (ref 7–25)
CALCIUM SPEC-SCNC: 9.3 MG/DL (ref 8.6–10.5)
CALCIUM SPEC-SCNC: 9.5 MG/DL (ref 8.6–10.5)
CHLORIDE SERPL-SCNC: 97 MMOL/L (ref 98–107)
CHLORIDE SERPL-SCNC: 97 MMOL/L (ref 98–107)
CHOLEST SERPL-MCNC: 130 MG/DL (ref 0–200)
CO2 SERPL-SCNC: 19 MMOL/L (ref 22–29)
CO2 SERPL-SCNC: 21 MMOL/L (ref 22–29)
CREAT BLD-MCNC: 11.35 MG/DL (ref 0.57–1)
CREAT BLD-MCNC: 11.92 MG/DL (ref 0.57–1)
DEPRECATED RDW RBC AUTO: 53.1 FL (ref 37–54)
EOSINOPHIL # BLD AUTO: 0.14 10*3/MM3 (ref 0–0.4)
EOSINOPHIL NFR BLD AUTO: 2.5 % (ref 0.3–6.2)
ERYTHROCYTE [DISTWIDTH] IN BLOOD BY AUTOMATED COUNT: 15.1 % (ref 12.3–15.4)
GFR SERPL CREATININE-BSD FRML MDRD: 3 ML/MIN/1.73
GFR SERPL CREATININE-BSD FRML MDRD: 3 ML/MIN/1.73
GFR SERPL CREATININE-BSD FRML MDRD: ABNORMAL ML/MIN/{1.73_M2}
GFR SERPL CREATININE-BSD FRML MDRD: ABNORMAL ML/MIN/{1.73_M2}
GLOBULIN UR ELPH-MCNC: 3 GM/DL
GLUCOSE BLD-MCNC: 33 MG/DL (ref 65–99)
GLUCOSE BLD-MCNC: 41 MG/DL (ref 65–99)
GLUCOSE BLDC GLUCOMTR-MCNC: 195 MG/DL (ref 70–130)
GLUCOSE BLDC GLUCOMTR-MCNC: 232 MG/DL (ref 70–130)
GLUCOSE BLDC GLUCOMTR-MCNC: 314 MG/DL (ref 70–130)
GLUCOSE BLDC GLUCOMTR-MCNC: 44 MG/DL (ref 70–130)
GLUCOSE BLDC GLUCOMTR-MCNC: 45 MG/DL (ref 70–130)
GLUCOSE BLDC GLUCOMTR-MCNC: 46 MG/DL (ref 70–130)
GLUCOSE BLDC GLUCOMTR-MCNC: 83 MG/DL (ref 70–130)
GLUCOSE BLDC GLUCOMTR-MCNC: 90 MG/DL (ref 70–130)
HBA1C MFR BLD: 5.1 % (ref 4.8–5.6)
HBV CORE IGM SERPL QL IA: NORMAL
HBV SURFACE AB SER RIA-ACNC: REACTIVE
HBV SURFACE AG SERPL QL IA: NORMAL
HCT VFR BLD AUTO: 29 % (ref 34–46.6)
HDLC SERPL-MCNC: 53 MG/DL (ref 40–60)
HGB BLD-MCNC: 9.5 G/DL (ref 12–15.9)
HOLD SPECIMEN: NORMAL
HOLD SPECIMEN: NORMAL
IMM GRANULOCYTES # BLD AUTO: 0.01 10*3/MM3 (ref 0–0.05)
IMM GRANULOCYTES NFR BLD AUTO: 0.2 % (ref 0–0.5)
LDLC SERPL CALC-MCNC: 60 MG/DL (ref 0–100)
LDLC/HDLC SERPL: 1.14 {RATIO}
LYMPHOCYTES # BLD AUTO: 1.71 10*3/MM3 (ref 0.7–3.1)
LYMPHOCYTES NFR BLD AUTO: 30 % (ref 19.6–45.3)
MCH RBC QN AUTO: 31.4 PG (ref 26.6–33)
MCHC RBC AUTO-ENTMCNC: 32.8 G/DL (ref 31.5–35.7)
MCV RBC AUTO: 95.7 FL (ref 79–97)
MONOCYTES # BLD AUTO: 0.5 10*3/MM3 (ref 0.1–0.9)
MONOCYTES NFR BLD AUTO: 8.8 % (ref 5–12)
NEUTROPHILS # BLD AUTO: 3.33 10*3/MM3 (ref 1.7–7)
NEUTROPHILS NFR BLD AUTO: 58.3 % (ref 42.7–76)
NRBC BLD AUTO-RTO: 0 /100 WBC (ref 0–0.2)
PLATELET # BLD AUTO: 175 10*3/MM3 (ref 140–450)
PMV BLD AUTO: 9.4 FL (ref 6–12)
POTASSIUM BLD-SCNC: 4.4 MMOL/L (ref 3.5–5.2)
POTASSIUM BLD-SCNC: 4.9 MMOL/L (ref 3.5–5.2)
PROT SERPL-MCNC: 6.8 G/DL (ref 6–8.5)
RBC # BLD AUTO: 3.03 10*6/MM3 (ref 3.77–5.28)
SODIUM BLD-SCNC: 138 MMOL/L (ref 136–145)
SODIUM BLD-SCNC: 140 MMOL/L (ref 136–145)
T4 FREE SERPL-MCNC: 0.66 NG/DL (ref 0.93–1.7)
TRIGL SERPL-MCNC: 84 MG/DL (ref 0–150)
TROPONIN T SERPL-MCNC: 0.03 NG/ML (ref 0–0.03)
TROPONIN T SERPL-MCNC: 0.03 NG/ML (ref 0–0.03)
TSH SERPL DL<=0.05 MIU/L-ACNC: 5.58 UIU/ML (ref 0.27–4.2)
VLDLC SERPL-MCNC: 16.8 MG/DL
WBC NRBC COR # BLD: 5.7 10*3/MM3 (ref 3.4–10.8)

## 2020-02-08 PROCEDURE — 5A1D70Z PERFORMANCE OF URINARY FILTRATION, INTERMITTENT, LESS THAN 6 HOURS PER DAY: ICD-10-PCS | Performed by: INTERNAL MEDICINE

## 2020-02-08 PROCEDURE — 63710000001 INSULIN LISPRO (HUMAN) PER 5 UNITS: Performed by: FAMILY MEDICINE

## 2020-02-08 PROCEDURE — 84484 ASSAY OF TROPONIN QUANT: CPT | Performed by: NURSE PRACTITIONER

## 2020-02-08 PROCEDURE — 80053 COMPREHEN METABOLIC PANEL: CPT | Performed by: FAMILY MEDICINE

## 2020-02-08 PROCEDURE — 84439 ASSAY OF FREE THYROXINE: CPT | Performed by: FAMILY MEDICINE

## 2020-02-08 PROCEDURE — 83036 HEMOGLOBIN GLYCOSYLATED A1C: CPT | Performed by: FAMILY MEDICINE

## 2020-02-08 PROCEDURE — 85025 COMPLETE CBC W/AUTO DIFF WBC: CPT | Performed by: FAMILY MEDICINE

## 2020-02-08 PROCEDURE — 80061 LIPID PANEL: CPT | Performed by: FAMILY MEDICINE

## 2020-02-08 PROCEDURE — 87340 HEPATITIS B SURFACE AG IA: CPT | Performed by: INTERNAL MEDICINE

## 2020-02-08 PROCEDURE — 86705 HEP B CORE ANTIBODY IGM: CPT | Performed by: INTERNAL MEDICINE

## 2020-02-08 PROCEDURE — 84443 ASSAY THYROID STIM HORMONE: CPT | Performed by: FAMILY MEDICINE

## 2020-02-08 PROCEDURE — 82962 GLUCOSE BLOOD TEST: CPT

## 2020-02-08 PROCEDURE — 63710000001 INSULIN DETEMIR PER 5 UNITS: Performed by: FAMILY MEDICINE

## 2020-02-08 PROCEDURE — 86706 HEP B SURFACE ANTIBODY: CPT | Performed by: INTERNAL MEDICINE

## 2020-02-08 RX ORDER — ACETAMINOPHEN 325 MG/1
650 TABLET ORAL EVERY 6 HOURS PRN
Status: DISCONTINUED | OUTPATIENT
Start: 2020-02-08 | End: 2020-02-09 | Stop reason: HOSPADM

## 2020-02-08 RX ORDER — FAMOTIDINE 20 MG/1
20 TABLET, FILM COATED ORAL DAILY
Status: DISCONTINUED | OUTPATIENT
Start: 2020-02-09 | End: 2020-02-09 | Stop reason: HOSPADM

## 2020-02-08 RX ADMIN — VITAMIN D 2000 UNITS: 25 TAB ORAL at 08:18

## 2020-02-08 RX ADMIN — DEXTROSE 15 G: 15 GEL ORAL at 05:46

## 2020-02-08 RX ADMIN — CARVEDILOL 12.5 MG: 6.25 TABLET, FILM COATED ORAL at 17:55

## 2020-02-08 RX ADMIN — PRAVASTATIN SODIUM 10 MG: 20 TABLET ORAL at 20:39

## 2020-02-08 RX ADMIN — TIMOLOL MALEATE 1 DROP: 5 SOLUTION OPHTHALMIC at 07:19

## 2020-02-08 RX ADMIN — ASPIRIN 81 MG: 81 TABLET ORAL at 08:18

## 2020-02-08 RX ADMIN — ACETAMINOPHEN 650 MG: 325 TABLET, FILM COATED ORAL at 18:32

## 2020-02-08 RX ADMIN — LATANOPROST 1 DROP: 50 SOLUTION OPHTHALMIC at 20:40

## 2020-02-08 RX ADMIN — LOSARTAN POTASSIUM 50 MG: 50 TABLET, FILM COATED ORAL at 08:18

## 2020-02-08 RX ADMIN — SEVELAMER CARBONATE 800 MG: 800 TABLET, FILM COATED ORAL at 12:39

## 2020-02-08 RX ADMIN — SODIUM CHLORIDE, PRESERVATIVE FREE 10 ML: 5 INJECTION INTRAVENOUS at 08:19

## 2020-02-08 RX ADMIN — INSULIN DETEMIR 10 UNITS: 100 INJECTION, SOLUTION SUBCUTANEOUS at 20:53

## 2020-02-08 RX ADMIN — DEXTROSE 15 G: 15 GEL ORAL at 06:09

## 2020-02-08 RX ADMIN — SEVELAMER CARBONATE 800 MG: 800 TABLET, FILM COATED ORAL at 08:18

## 2020-02-08 RX ADMIN — ACETAMINOPHEN 650 MG: 325 TABLET, FILM COATED ORAL at 00:50

## 2020-02-08 RX ADMIN — INSULIN LISPRO 2 UNITS: 100 INJECTION, SOLUTION INTRAVENOUS; SUBCUTANEOUS at 20:54

## 2020-02-08 RX ADMIN — DORZOLAMIDE HYDROCHLORIDE 1 DROP: 20 SOLUTION OPHTHALMIC at 20:41

## 2020-02-08 RX ADMIN — INSULIN LISPRO 5 UNITS: 100 INJECTION, SOLUTION INTRAVENOUS; SUBCUTANEOUS at 12:38

## 2020-02-08 RX ADMIN — BRIMONIDINE TARTRATE 1 DROP: 2 SOLUTION OPHTHALMIC at 20:39

## 2020-02-08 RX ADMIN — CARVEDILOL 12.5 MG: 6.25 TABLET, FILM COATED ORAL at 08:18

## 2020-02-08 RX ADMIN — BRIMONIDINE TARTRATE 1 DROP: 2 SOLUTION OPHTHALMIC at 08:17

## 2020-02-08 RX ADMIN — SEVELAMER CARBONATE 800 MG: 800 TABLET, FILM COATED ORAL at 17:55

## 2020-02-08 RX ADMIN — SODIUM CHLORIDE, PRESERVATIVE FREE 10 ML: 5 INJECTION INTRAVENOUS at 20:39

## 2020-02-08 RX ADMIN — FAMOTIDINE 20 MG: 20 TABLET, FILM COATED ORAL at 08:18

## 2020-02-08 RX ADMIN — SERTRALINE HYDROCHLORIDE 25 MG: 25 TABLET ORAL at 08:18

## 2020-02-08 NOTE — OUTREACH NOTE
CHF Week 3 Survey      Responses   Facility patient discharged from?  [place]   Does the patient have one of the following disease processes/diagnoses(primary or secondary)?  CHF   Week 3 attempt successful?  No   Revoke  Readmitted          Nadiya Jensen RN

## 2020-02-08 NOTE — CONSULTS
Nephrology ([facility]) Consult Note      Patient:  Mini Gentile  YOB: 1960  Date of Service: 2/8/2020  MRN: [id number]   Acct: [id number]   Primary Care Physician: Provider, No Known  Advance Directive:   Code Status and Medical Interventions:   Ordered at: 02/07/20 1712     Code Status:    CPR     Medical Interventions (Level of Support Prior to Arrest):    Full     Admit Date: 2/7/2020       Hospital Day: 1  Referring Provider: No Known Provider      Patient Seen, Chart, Consults, Notes, Labs, Radiology studies reviewed.        Subjective:  Mini Gentile is a 59 y.o. female  whom we were consulted for end-stage renal disease.  Patient has been on chronic maintenance hemodialysis at [facility] on Tuesdays Thursdays and Saturday.  She has missed her last 2 appointments.  Stated that she has been ill with nausea and upset stomach.  She became progressively more short of breath and she reported to the emergency room.  She has no dysuria.    Allergies:  Bupropion; Chantix [varenicline]; Sulfa antibiotics; Azithromycin; Levofloxacin; and Penicillins    Home Meds:  Medications Prior to Admission   Medication Sig Dispense Refill Last Dose   • albuterol sulfate  (90 Base) MCG/ACT inhaler Inhale 2 puffs Every 4 (Four) Hours As Needed for Wheezing. 18 g 0    • aspirin 81 MG EC tablet Take 81 mg by mouth Daily.   12/5/2019 at Unknown time   • brimonidine (ALPHAGAN) 0.2 % ophthalmic solution Administer 1 drop into the left eye 3 (Three) Times a Day.   12/5/2019 at Unknown time   • calcitriol (ROCALTROL) 0.25 MCG capsule Take 0.25 mcg by mouth Take As Directed. Only given on dialysis days (Tuesday, Thursday, Saturday)   12/5/2019 at Unknown time   • carvedilol (COREG) 12.5 MG tablet Take 1 tablet by mouth 2 (Two) Times a Day With Meals. 60 tablet 0    • cetirizine (zyrTEC) 10 MG tablet Take 10 mg by mouth At Night As Needed (allergies).   12/5/2019 at Unknown time      • cholecalciferol (VITAMIN D3) 1000 units tablet Take 2,000 Units by mouth Daily.   12/5/2019 at Unknown time   • dorzolamide (TRUSOPT) 2 % ophthalmic solution Administer 1 drop into the left eye 2 (Two) Times a Day.   12/5/2019 at Unknown time   • famotidine (PEPCID) 20 MG tablet Take 20 mg by mouth 2 (Two) Times a Day.   12/5/2019 at Unknown time   • fluticasone (FLONASE) 50 MCG/ACT nasal spray 2 sprays into the nostril(s) as directed by provider 2 (Two) Times a Day.   12/5/2019 at Unknown time   • homatropine 5 % ophthalmic solution Administer 1 drop into the left eye Daily.   12/5/2019 at Unknown time   • insulin glargine (LANTUS) 100 UNIT/ML injection Inject 10 Units under the skin Every Night.   12/5/2019 at Unknown time   • lidocaine-prilocaine (EMLA) 2.5-2.5 % cream Apply 1 application topically to the appropriate area as directed Take As Directed. Apply to access area 30 minutes prior to dialysis.    12/5/2019 at Unknown time   • losartan (COZAAR) 50 MG tablet Take 50 mg by mouth Daily.      • midodrine (PROAMATINE) 5 MG tablet Take 5 mg by mouth Take As Directed. as needed for low blood pressure during dialysis.   12/5/2019 at Unknown time   • Netarsudil Dimesylate (RHOPRESSA) 0.02 % solution Administer 1 drop into the left eye Every Night.   12/5/2019 at Unknown time   • pravastatin (PRAVACHOL) 10 MG tablet Take 10 mg by mouth Every Night.   12/5/2019 at Unknown time   • sertraline (ZOLOFT) 25 MG tablet Take 25 mg by mouth Daily.      • sevelamer (RENAGEL) 800 MG tablet Take 2,400 mg by mouth 3 (Three) Times a Day With Meals.   12/5/2019 at Unknown time   • timolol (TIMOPTIC) 0.5 % ophthalmic solution Administer 1 drop into the left eye Every Morning.   12/5/2019 at Unknown time   • travoprost, BAK free, (TRAVATAN Z) 0.004 % solution ophthalmic solution Administer 1 drop into the left eye Every Night.   12/5/2019 at Unknown time   • nicotine (NICODERM CQ) 21 MG/24HR patch Place 1 patch on the skin as  directed by provider Every Night. 28 patch 0        Medicines:  Current Facility-Administered Medications   Medication Dose Route Frequency Provider Last Rate Last Dose   • acetaminophen (TYLENOL) tablet 650 mg  650 mg Oral Q6H PRN Catina Villegas APRN   650 mg at 02/08/20 0050   • aspirin EC tablet 81 mg  81 mg Oral Daily Lyndon Lockett MD   81 mg at 02/08/20 0818   • brimonidine (ALPHAGAN) 0.2 % ophthalmic solution 1 drop  1 drop Left Eye TID Lyndon Lockett MD   1 drop at 02/08/20 0817   • calcitriol (ROCALTROL) capsule 0.25 mcg  0.25 mcg Oral Take As Directed Lyndon Lockett MD       • carvedilol (COREG) tablet 12.5 mg  12.5 mg Oral BID With Meals Lyndon Lockett MD   12.5 mg at 02/08/20 0818   • cholecalciferol (VITAMIN D3) tablet 2,000 Units  2,000 Units Oral Daily Lyndon Lockett MD   2,000 Units at 02/08/20 0818   • dextrose (D50W) 25 g/ 50mL Intravenous Solution 25 g  25 g Intravenous Q15 Min PRN Lyndon Lockett MD       • dextrose (GLUTOSE) oral gel 15 g  15 g Oral Q15 Min PRN Lyndon Lockett MD   15 g at 02/08/20 0609   • dorzolamide (TRUSOPT) 2 % ophthalmic solution 1 drop  1 drop Left Eye BID Lyndon Lockett MD       • famotidine (PEPCID) tablet 20 mg  20 mg Oral BID Lyndon Lockett MD   20 mg at 02/08/20 0818   • glucagon (human recombinant) (GLUCAGEN DIAGNOSTIC) injection 1 mg  1 mg Subcutaneous Q15 Min PRN Lyndon Lockett MD       • insulin detemir (LEVEMIR) injection 10 Units  10 Units Subcutaneous Nightly Lyndon Lockett MD   10 Units at 02/07/20 2158   • insulin lispro (humaLOG) injection 2-7 Units  2-7 Units Subcutaneous 4x Daily With Meals & Nightly Lyndon Lockett MD   5 Units at 02/08/20 1238   • ipratropium-albuterol (DUO-NEB) nebulizer solution 3 mL  3 mL Nebulization Q4H PRN Catina Villegas, ANTHONY   3 mL at 02/07/20 2145   • latanoprost (XALATAN) 0.005 % ophthalmic solution 1 drop  1 drop Left Eye Nightly Lyndon Lockett MD   1 drop at 02/07/20 2025   • losartan (COZAAR) tablet 50 mg   50 mg Oral Daily Lyndon Lockett MD   50 mg at 20   • ondansetron (ZOFRAN) injection 4 mg  4 mg Intravenous Q6H PRN Lyndon Lockett MD       • pravastatin (PRAVACHOL) tablet 10 mg  10 mg Oral Nightly Lyndon Lockett MD   10 mg at 20   • sertraline (ZOLOFT) tablet 25 mg  25 mg Oral Daily Lyndon Lockett MD   25 mg at 20   • sevelamer (RENVELA) tablet 800 mg  800 mg Oral TID With Meals Lyndon Lockett MD   800 mg at 20 1239   • sodium chloride 0.9 % flush 10 mL  10 mL Intravenous Q12H Lyndon Lockett MD   10 mL at 20   • sodium chloride 0.9 % flush 10 mL  10 mL Intravenous PRN Lyndon Lockett MD       • timolol (TIMOPTIC) 0.5 % ophthalmic solution 1 drop  1 drop Left Eye QAM Lyndon Lockett MD   1 drop at 20 07       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    • Chronic otitis media    • Diabetes (CMS/HCC)    • Eustachian tube dysfunction    • Glaucoma    • HTN (hypertension)    • Kidney failure     on dialysis   • Liver disorder    • Mucoid otitis media        Past Surgical History:  Past Surgical History:   Procedure Laterality Date   • ARTERIOVENOUS FISTULA     • CATARACT EXTRACTION WITH INTRAOCULAR LENS IMPLANT     •  SECTION     • CHOLECYSTECTOMY     • MYRINGOTOMY W/ TUBES Bilateral    • MYRINGOTOMY W/ TUBES Right    • TUBAL ABDOMINAL LIGATION         Family History  Family History   Problem Relation Age of Onset   • Heart disease Mother        Social History  Social History     Socioeconomic History   • Marital status:      Spouse name: Not on file   • Number of children: Not on file   • Years of education: Not on file   • Highest education level: Not on file   Tobacco Use   • Smoking status: Current Every Day Smoker     Packs/day: 1.00     Years: 6.00     Pack years: 6.00     Types: Cigarettes   • Smokeless tobacco: Never Used   Substance and Sexual Activity   •  "Alcohol use: No   • Drug use: No   • Sexual activity: Defer         Review of Systems:  History obtained from chart review and the patient  General ROS: No fever or chills  Respiratory ROS: No cough,+ shortness of breath,- wheezing  Cardiovascular ROS: no chest pain but dyspnea on exertion  Gastrointestinal ROS: + abdominal pain, no melena  Genito-Urinary ROS: No dysuria or hematuria  14 point ROS reviewed with the patient and negative except as noted above and in the HPI unless unable to obtain.    Objective:  /83 (BP Location: Right arm, Patient Position: Lying)   Pulse 82   Temp 98.4 °F (36.9 °C) (Oral)   Resp 18   Ht 149.9 cm (59\")   Wt 62.1 kg (137 lb)   SpO2 94%   BMI 27.67 kg/m²     Intake/Output Summary (Last 24 hours) at 2/8/2020 1257  Last data filed at 2/8/2020 0800  Gross per 24 hour   Intake 240 ml   Output --   Net 240 ml     General: awake/alert    Head: atraumatic, normocephalic  Neck: no palpable masses, no jugular venous distention  Chest: Bilateral air entry, decreased breath sounds at bases  CVS: regular rate and rhythm  Abdominal: soft, nontender, normal bowel sounds  Extremities: no cyanosis or edema  Skin: warm and dry without rash  Neuro: No focal motor deficits  Musculoskeletal: No obvious joint effusions    Labs:  Lab Results (last 72 hours)     Procedure Component Value Units Date/Time    POC Glucose Once [[id number]]  (Abnormal) Collected:  02/08/20 1214    Specimen:  Blood Updated:  02/08/20 1231     Glucose 314 mg/dL      Comment: : [id number] Michelle ID: [id number]       Hepatitis B Surface Antibody [[id number]] Collected:  02/08/20 1053    Specimen:  Blood Updated:  02/08/20 1141    Hepatitis B Core Antibody, IgM [[id number]]  (Normal) Collected:  02/08/20 1053    Specimen:  Blood Updated:  02/08/20 1141     Hep B C IgM Non-Reactive    Narrative:       Results may be falsely decreased if patient taking Biotin.      Hepatitis B Surface Antigen [[id number]] " Collected:  02/08/20 1053    Specimen:  Blood Updated:  02/08/20 1140    Narrative:       The following orders were created for panel order Hepatitis B Surface Antigen.  Procedure                               Abnormality         Status                     ---------                               -----------         ------                     Hepatitis B Surface Antigen[[id number]]  Normal              Final result               Hep B Confirmation Tube[[id number]]                          Final result                 Please view results for these tests on the individual orders.    Hepatitis B Surface Antigen [[id number]]  (Normal) Collected:  02/08/20 1053    Specimen:  Blood Updated:  02/08/20 1140     Hepatitis B Surface Ag Non-Reactive    Narrative:       Results may be falsely decreased if patient taking Biotin.      Hep B Confirmation Tube [[id number]] Collected:  02/08/20 1053    Specimen:  Blood Updated:  02/08/20 1125     Extra Tube Hold    Extra Tubes [[id number]] Collected:  02/08/20 0607    Specimen:  Blood, Venous Line Updated:  02/08/20 0846    Narrative:       The following orders were created for panel order Extra Tubes.  Procedure                               Abnormality         Status                     ---------                               -----------         ------                     Green Top (Gel)[[id number]]                                  Final result                 Please view results for these tests on the individual orders.    Green Top (Gel) [[id number]] Collected:  02/08/20 0607    Specimen:  Blood Updated:  02/08/20 0846     Extra Tube Hold for add-ons.     Comment: Auto resulted.       POC Glucose Once [[id number]]  (Abnormal) Collected:  02/08/20 0816    Specimen:  Blood Updated:  02/08/20 0836     Glucose 195 mg/dL      Comment: : [id number] Jose C WallaceMeter ID: [id number]       Basic Metabolic Panel [[id number]]  (Abnormal) Collected:  02/08/20 0607    Specimen:  Blood Updated:   02/08/20 0817     Glucose 33 mg/dL      BUN 74 mg/dL      Creatinine 11.35 mg/dL      Sodium 140 mmol/L      Potassium 4.4 mmol/L      Chloride 97 mmol/L      CO2 21.0 mmol/L      Calcium 9.5 mg/dL      eGFR   Amer --     Comment: <15 Indicative of kidney failure.        eGFR Non African Amer 3 mL/min/1.73      Comment: <15 Indicative of kidney failure.        BUN/Creatinine Ratio 6.5     Anion Gap 22.0 mmol/L     Narrative:       GFR Normal >60  Chronic Kidney Disease <60  Kidney Failure <15      Hemoglobin A1c [[id number]]  (Normal) Collected:  02/08/20 0609    Specimen:  Blood Updated:  02/08/20 0649     Hemoglobin A1C 5.10 %     Narrative:       Hemoglobin A1C Ranges:    Increased Risk for Diabetes  5.7% to 6.4%  Diabetes                     >= 6.5%  Diabetic Goal                < 7.0%    Troponin [[id number]]  (Abnormal) Collected:  02/08/20 0537    Specimen:  Blood Updated:  02/08/20 0635     Troponin T 0.032 ng/mL     Narrative:       Troponin T Reference Range:  <= 0.03 ng/mL-   Negative for AMI  >0.03 ng/mL-     Abnormal for myocardial necrosis.  Clinicians would have to utilize clinical acumen, EKG, Troponin and serial changes to determine if it is an Acute Myocardial Infarction or myocardial injury due to an underlying chronic condition.       Results may be falsely decreased if patient taking Biotin.      Comprehensive Metabolic Panel [[id number]]  (Abnormal) Collected:  02/08/20 0537    Specimen:  Blood Updated:  02/08/20 0634     Glucose 41 mg/dL      BUN 72 mg/dL      Creatinine 11.92 mg/dL      Sodium 138 mmol/L      Potassium 4.9 mmol/L      Chloride 97 mmol/L      CO2 19.0 mmol/L      Calcium 9.3 mg/dL      Total Protein 6.8 g/dL      Albumin 3.80 g/dL      ALT (SGPT) 16 U/L      AST (SGOT) 24 U/L      Alkaline Phosphatase 68 U/L      Total Bilirubin 0.4 mg/dL      eGFR Non African Amer 3 mL/min/1.73      Comment: <15 Indicative of kidney failure.        eGFR   Amer --     Comment: <15  Indicative of kidney failure.        Globulin 3.0 gm/dL      A/G Ratio 1.3 g/dL      BUN/Creatinine Ratio 6.0     Anion Gap 22.0 mmol/L     Narrative:       GFR Normal >60  Chronic Kidney Disease <60  Kidney Failure <15      POC Glucose Once [[id number]]  (Normal) Collected:  02/08/20 0620    Specimen:  Blood Updated:  02/08/20 0632     Glucose 83 mg/dL      Comment: : [id number] Sy (Chauncey) KristinaMeter ID: [id number]       Lipid Panel [[id number]] Collected:  02/08/20 0537    Specimen:  Blood Updated:  02/08/20 0626     Total Cholesterol 130 mg/dL      Triglycerides 84 mg/dL      HDL Cholesterol 53 mg/dL      LDL Cholesterol  60 mg/dL      VLDL Cholesterol 16.8 mg/dL      LDL/HDL Ratio 1.14    Narrative:       Cholesterol Reference Ranges  (U.S. Department of Health and Human Services ATP III Classifications)    Desirable          <200 mg/dL  Borderline High    200-239 mg/dL  High Risk          >240 mg/dL      Triglyceride Reference Ranges  (U.S. Department of Health and Human Services ATP III Classifications)    Normal           <150 mg/dL  Borderline High  150-199 mg/dL  High             200-499 mg/dL  Very High        >500 mg/dL    HDL Reference Ranges  (U.S. Department of Health and Human Services ATP III Classifcations)    Low     <40 mg/dl (major risk factor for CHD)  High    >60 mg/dl ('negative' risk factor for CHD)        LDL Reference Ranges  (U.S. Department of Health and Human Services ATP III Classifcations)    Optimal          <100 mg/dL  Near Optimal     100-129 mg/dL  Borderline High  130-159 mg/dL  High             160-189 mg/dL  Very High        >189 mg/dL    TSH [[id number]]  (Abnormal) Collected:  02/08/20 0537    Specimen:  Blood Updated:  02/08/20 0626     TSH 5.580 uIU/mL     POC Glucose Once [[id number]]  (Abnormal) Collected:  02/08/20 0605    Specimen:  Blood Updated:  02/08/20 0621     Glucose 46 mg/dL      Comment: : [id number] Sy (Chauncey) WillamistinaMeter ID: [id number]         CBC Auto Differential [[id number]]  (Abnormal) Collected:  02/08/20 0606    Specimen:  Blood Updated:  02/08/20 0618     WBC 5.70 10*3/mm3      RBC 3.03 10*6/mm3      Hemoglobin 9.5 g/dL      Hematocrit 29.0 %      MCV 95.7 fL      MCH 31.4 pg      MCHC 32.8 g/dL      RDW 15.1 %      RDW-SD 53.1 fl      MPV 9.4 fL      Platelets 175 10*3/mm3      Neutrophil % 58.3 %      Lymphocyte % 30.0 %      Monocyte % 8.8 %      Eosinophil % 2.5 %      Basophil % 0.2 %      Immature Grans % 0.2 %      Neutrophils, Absolute 3.33 10*3/mm3      Lymphocytes, Absolute 1.71 10*3/mm3      Monocytes, Absolute 0.50 10*3/mm3      Eosinophils, Absolute 0.14 10*3/mm3      Basophils, Absolute 0.01 10*3/mm3      Immature Grans, Absolute 0.01 10*3/mm3      nRBC 0.0 /100 WBC     POC Glucose Once [[id number]]  (Abnormal) Collected:  02/08/20 0552    Specimen:  Blood Updated:  02/08/20 0605     Glucose 44 mg/dL      Comment: : [id number] Sy ([place]) [facility]: [id number]       POC Glucose Once [[id number]]  (Abnormal) Collected:  02/08/20 0543    Specimen:  Blood Updated:  02/08/20 0604     Glucose 45 mg/dL      Comment: : [id number] Jeffrey Gerard ID: [id number]       Blood Culture - Blood, Arm, Right [[id number]] Collected:  02/07/20 1422    Specimen:  Blood from Arm, Right Updated:  02/08/20 0301     Blood Culture No growth at less than 24 hours    Blood Culture - Blood, Arm, Right [[id number]] Collected:  02/07/20 1415    Specimen:  Blood from Arm, Right Updated:  02/08/20 0301     Blood Culture No growth at less than 24 hours    Troponin [[id number]]  (Normal) Collected:  02/08/20 0003    Specimen:  Blood Updated:  02/08/20 0034     Troponin T 0.028 ng/mL     Narrative:       Troponin T Reference Range:  <= 0.03 ng/mL-   Negative for AMI  >0.03 ng/mL-     Abnormal for myocardial necrosis.  Clinicians would have to utilize clinical acumen, EKG, Troponin and serial changes to determine if it is an Acute Myocardial  "Infarction or myocardial injury due to an underlying chronic condition.       Results may be falsely decreased if patient taking Biotin.      POC Glucose Once [[id number]]  (Normal) Collected:  02/07/20 2136    Specimen:  Blood Updated:  02/07/20 2152     Glucose 97 mg/dL      Comment: : [id number] Sy (Chauncey) YrisaMetalia ID: [id number]       POC Glucose Once [[id number]]  (Abnormal) Collected:  02/07/20 1741    Specimen:  Blood Updated:  02/07/20 1812     Glucose 315 mg/dL      Comment: : [id number] Merritt MorseMeter ID: [id number]       Procalcitonin [[id number]]  (Normal) Collected:  02/07/20 1502    Specimen:  Blood from Arm, Right Updated:  02/07/20 1547     Procalcitonin 0.24 ng/mL     Narrative:       As a Marker for Sepsis (Non-Neonates):   1. <0.5 ng/mL represents a low risk of severe sepsis and/or septic shock.  1. >2 ng/mL represents a high risk of severe sepsis and/or septic shock.    As a Marker for Lower Respiratory Tract Infections that require antibiotic therapy:  PCT on Admission     Antibiotic Therapy             6-12 Hrs later  > 0.5                Strongly Recommended            >0.25 - <0.5         Recommended  0.1 - 0.25           Discouraged                   Remeasure/reassess PCT  <0.1                 Strongly Discouraged          Remeasure/reassess PCT      As 28 day mortality risk marker: \"Change in Procalcitonin Result\" (> 80 % or <=80 %) if Day 0 (or Day 1) and Day 4 values are available. Refer to http://www.[facility]-pct-calculator.com/   Change in PCT <=80 %   A decrease of PCT levels below or equal to 80 % defines a positive change in PCT test result representing a higher risk for 28-day all-cause mortality of patients diagnosed with severe sepsis or septic shock.  Change in PCT > 80 %   A decrease of PCT levels of more than 80 % defines a negative change in PCT result representing a lower risk for 28-day all-cause mortality of patients diagnosed with severe sepsis or " septic shock.                Results may be falsely decreased if patient taking Biotin.     [place] Draw [[id number]] Collected:  02/07/20 1419    Specimen:  Blood from Arm, Right Updated:  02/07/20 1531    Narrative:       The following orders were created for panel order [place] Draw.  Procedure                               Abnormality         Status                     ---------                               -----------         ------                     Light Blue Top[[id number]]                                   Final result               Green Top (Gel)[[id number]]                                  Final result               Lavender Top[[id number]]                                     Final result               Red Top[[id number]]                                          Final result                 Please view results for these tests on the individual orders.    Light Blue Top [[id number]] Collected:  02/07/20 1419    Specimen:  Blood from Arm, Right Updated:  02/07/20 1531     Extra Tube hold for add-on     Comment: Auto resulted       Green Top (Gel) [[id number]] Collected:  02/07/20 1419    Specimen:  Blood from Arm, Right Updated:  02/07/20 1531     Extra Tube Hold for add-ons.     Comment: Auto resulted.       Lavender Top [[id number]] Collected:  02/07/20 1419    Specimen:  Blood from Arm, Right Updated:  02/07/20 1531     Extra Tube hold for add-on     Comment: Auto resulted       Red Top [[id number]] Collected:  02/07/20 1419    Specimen:  Blood from Arm, Right Updated:  02/07/20 1531     Extra Tube Hold for add-ons.     Comment: Auto resulted.       Urinalysis With Culture If Indicated - Urine, Catheter [[id number]]  (Abnormal) Collected:  02/07/20 1506    Specimen:  Urine, Catheter Updated:  02/07/20 1531     Color, UA Yellow     Appearance, UA Clear     pH, UA 8.5     Specific Gravity, UA 1.017     Glucose,  mg/dL (1+)     Ketones, UA Trace     Bilirubin, UA Negative     Blood, UA Trace     Protein, UA  >=300 mg/dL (3+)     Leuk Esterase, UA Negative     Nitrite, UA Negative     Urobilinogen, UA 0.2 E.U./dL    Urinalysis, Microscopic Only - Urine, Catheter [[id number]]  (Abnormal) Collected:  02/07/20 1506    Specimen:  Urine, Catheter Updated:  02/07/20 1531     RBC, UA 6-12 /HPF      WBC, UA 0-2 /HPF      Bacteria, UA None Seen /HPF      Squamous Epithelial Cells, UA 0-2 /HPF      Hyaline Casts, UA None Seen /LPF      Methodology Automated Microscopy    Comprehensive Metabolic Panel [[id number]]  (Abnormal) Collected:  02/07/20 1502    Specimen:  Blood from Arm, Right Updated:  02/07/20 1525     Glucose 121 mg/dL      BUN 70 mg/dL      Creatinine 11.17 mg/dL      Sodium 137 mmol/L      Potassium 6.5 mmol/L      Chloride 95 mmol/L      CO2 22.0 mmol/L      Calcium 9.4 mg/dL      Total Protein 7.4 g/dL      Albumin 4.20 g/dL      ALT (SGPT) 9 U/L      AST (SGOT) 16 U/L      Comment: Specimen hemolyzed.  Results may be affected.        Alkaline Phosphatase 63 U/L      Total Bilirubin 0.3 mg/dL      eGFR Non African Amer 3 mL/min/1.73      Comment: <15 Indicative of kidney failure.        eGFR   Amer --     Comment: <15 Indicative of kidney failure.        Globulin 3.2 gm/dL      A/G Ratio 1.3 g/dL      BUN/Creatinine Ratio 6.3     Anion Gap 20.0 mmol/L     Narrative:       GFR Normal >60  Chronic Kidney Disease <60  Kidney Failure <15      Lactic Acid, Plasma [[id number]]  (Normal) Collected:  02/07/20 1419    Specimen:  Blood from Arm, Right Updated:  02/07/20 1447     Lactate 1.5 mmol/L     Influenza Antigen, Rapid - Swab, Nasopharynx [[id number]]  (Normal) Collected:  02/07/20 1404    Specimen:  Swab from Nasopharynx Updated:  02/07/20 1446     Influenza A Ag, EIA Negative     Influenza B Ag, EIA Negative    Narrative:       Recommend confirmation of negative results by viral culture or molecular assay.    CBC & Differential [[id number]] Collected:  02/07/20 1419    Specimen:  Blood from Arm, Right Updated:   02/07/20 1433    Narrative:       The following orders were created for panel order CBC & Differential.  Procedure                               Abnormality         Status                     ---------                               -----------         ------                     CBC Auto Differential[[id number]]        Abnormal            Final result                 Please view results for these tests on the individual orders.    CBC Auto Differential [[id number]]  (Abnormal) Collected:  02/07/20 1419    Specimen:  Blood from Arm, Right Updated:  02/07/20 1433     WBC 6.43 10*3/mm3      RBC 2.99 10*6/mm3      Hemoglobin 9.5 g/dL      Hematocrit 29.2 %      MCV 97.7 fL      MCH 31.8 pg      MCHC 32.5 g/dL      RDW 15.4 %      RDW-SD 54.6 fl      MPV 9.4 fL      Platelets 171 10*3/mm3      Neutrophil % 68.6 %      Lymphocyte % 20.7 %      Monocyte % 6.2 %      Eosinophil % 4.0 %      Basophil % 0.3 %      Immature Grans % 0.2 %      Neutrophils, Absolute 4.41 10*3/mm3      Lymphocytes, Absolute 1.33 10*3/mm3      Monocytes, Absolute 0.40 10*3/mm3      Eosinophils, Absolute 0.26 10*3/mm3      Basophils, Absolute 0.02 10*3/mm3      Immature Grans, Absolute 0.01 10*3/mm3      nRBC 0.0 /100 WBC           Radiology:   Imaging Results (Last 72 Hours)     Procedure Component Value Units Date/Time    XR Chest 1 View [[id number]] Collected:  02/07/20 1451     Updated:  02/07/20 1455    Narrative:       EXAMINATION: XR CHEST 1 VW- 2/7/2020 2:51 PM CST     HISTORY: Shortness of breath     COMPARISON: 01/19/2020     FINDINGS:  Heart is enlarged. Atherosclerotic calcifications are seen in the aorta.  Diffuse increased interstitial markings are noted with atelectasis in  the medial left lung base. Pulmonary vasculature is indistinct,  suggesting pulmonary vascular congestion. A vascular stent projects over  the left lung apex. There is no appreciable pneumothorax or pleural  effusion. Mild central bronchial wall thickening is noted.        Impression:       Cardiomegaly with findings suggestive of pulmonary vascular  congestion and pulmonary edema.  This report was finalized on 02/07/2020 14:52 by Dr. Raman Bush MD.          Culture:  No components found for: WOUNDCUL, 3  No components found for: CSFCUL, 3  No components found for: BC, 3  No components found for: URINECUL, 3      Assessment   -End-stage renal disease  -Benign hypertension  -Noncompliance  -Abdominal pain, nausea  -Anemia of CKD  - Type 2 diabetes with renal disease    Plan:  We will provide urgent dialysis today.  We will continue to monitor her labs.      Thank you for the consult, we appreciate the opportunity to provide care to your patients.  Feel free to contact me if I can be of any further assistance.      Ten Boyd MD  2/8/2020  12:57 PM

## 2020-02-08 NOTE — PLAN OF CARE
Problem: Patient Care Overview  Goal: Plan of Care Review  2/8/2020 0603 by Chichi Dan RN  Outcome: Ongoing (interventions implemented as appropriate)  Flowsheets  Taken 2/8/2020 0603  Plan of Care Reviewed With: patient  Taken 2/8/2020 0556  Outcome Summary: pt admitted with end stage renal disease; pt will be recieving dialysis today; BG dropped to 44 this morning; glucose gel given along with apple juice and taras crackers; pt stayed A&O during low BG; pt also had some chest pain last night; EKG and troponin done; found that the chest pain was made worse with deep breaths; gave some tylenol with relief noted; pt rested fair; VSS; safety maintained; will continue to monitor

## 2020-02-09 VITALS
WEIGHT: 137 LBS | HEART RATE: 80 BPM | TEMPERATURE: 98.3 F | SYSTOLIC BLOOD PRESSURE: 139 MMHG | BODY MASS INDEX: 27.62 KG/M2 | OXYGEN SATURATION: 98 % | RESPIRATION RATE: 16 BRPM | DIASTOLIC BLOOD PRESSURE: 42 MMHG | HEIGHT: 59 IN

## 2020-02-09 LAB
ALBUMIN SERPL-MCNC: 3.6 G/DL (ref 3.5–5.2)
ALBUMIN/GLOB SERPL: 1.2 G/DL
ALP SERPL-CCNC: 68 U/L (ref 39–117)
ALT SERPL W P-5'-P-CCNC: 14 U/L (ref 1–33)
ANION GAP SERPL CALCULATED.3IONS-SCNC: 14 MMOL/L (ref 5–15)
AST SERPL-CCNC: 19 U/L (ref 1–32)
BASOPHILS # BLD AUTO: 0.02 10*3/MM3 (ref 0–0.2)
BASOPHILS NFR BLD AUTO: 0.3 % (ref 0–1.5)
BILIRUB SERPL-MCNC: 0.3 MG/DL (ref 0.2–1.2)
BUN BLD-MCNC: 20 MG/DL (ref 8–23)
BUN/CREAT SERPL: 3.6 (ref 7–25)
CALCIUM SPEC-SCNC: 9.1 MG/DL (ref 8.6–10.5)
CHLORIDE SERPL-SCNC: 94 MMOL/L (ref 98–107)
CO2 SERPL-SCNC: 29 MMOL/L (ref 22–29)
CREAT BLD-MCNC: 5.54 MG/DL (ref 0.57–1)
DEPRECATED RDW RBC AUTO: 53.7 FL (ref 37–54)
EOSINOPHIL # BLD AUTO: 0.12 10*3/MM3 (ref 0–0.4)
EOSINOPHIL NFR BLD AUTO: 2 % (ref 0.3–6.2)
ERYTHROCYTE [DISTWIDTH] IN BLOOD BY AUTOMATED COUNT: 14.9 % (ref 12.3–15.4)
GFR SERPL CREATININE-BSD FRML MDRD: 8 ML/MIN/1.73
GFR SERPL CREATININE-BSD FRML MDRD: ABNORMAL ML/MIN/{1.73_M2}
GLOBULIN UR ELPH-MCNC: 3.1 GM/DL
GLUCOSE BLD-MCNC: 183 MG/DL (ref 65–99)
GLUCOSE BLDC GLUCOMTR-MCNC: 136 MG/DL (ref 70–130)
GLUCOSE BLDC GLUCOMTR-MCNC: 44 MG/DL (ref 70–130)
GLUCOSE BLDC GLUCOMTR-MCNC: 45 MG/DL (ref 70–130)
GLUCOSE BLDC GLUCOMTR-MCNC: 46 MG/DL (ref 70–130)
GLUCOSE BLDC GLUCOMTR-MCNC: 48 MG/DL (ref 70–130)
GLUCOSE BLDC GLUCOMTR-MCNC: 66 MG/DL (ref 70–130)
GLUCOSE BLDC GLUCOMTR-MCNC: 68 MG/DL (ref 70–130)
GLUCOSE BLDC GLUCOMTR-MCNC: 72 MG/DL (ref 70–130)
GLUCOSE BLDC GLUCOMTR-MCNC: 88 MG/DL (ref 70–130)
HCT VFR BLD AUTO: 29.3 % (ref 34–46.6)
HGB BLD-MCNC: 9.4 G/DL (ref 12–15.9)
IMM GRANULOCYTES # BLD AUTO: 0.04 10*3/MM3 (ref 0–0.05)
IMM GRANULOCYTES NFR BLD AUTO: 0.7 % (ref 0–0.5)
IRON 24H UR-MRATE: 16 MCG/DL (ref 37–145)
IRON SATN MFR SERPL: 7 % (ref 20–50)
LYMPHOCYTES # BLD AUTO: 0.66 10*3/MM3 (ref 0.7–3.1)
LYMPHOCYTES NFR BLD AUTO: 10.9 % (ref 19.6–45.3)
MCH RBC QN AUTO: 31.2 PG (ref 26.6–33)
MCHC RBC AUTO-ENTMCNC: 32.1 G/DL (ref 31.5–35.7)
MCV RBC AUTO: 97.3 FL (ref 79–97)
MONOCYTES # BLD AUTO: 0.56 10*3/MM3 (ref 0.1–0.9)
MONOCYTES NFR BLD AUTO: 9.2 % (ref 5–12)
NEUTROPHILS # BLD AUTO: 4.67 10*3/MM3 (ref 1.7–7)
NEUTROPHILS NFR BLD AUTO: 76.9 % (ref 42.7–76)
NRBC BLD AUTO-RTO: 0 /100 WBC (ref 0–0.2)
PHOSPHATE SERPL-MCNC: 4.3 MG/DL (ref 2.5–4.5)
PLATELET # BLD AUTO: 217 10*3/MM3 (ref 140–450)
PMV BLD AUTO: 9.5 FL (ref 6–12)
POTASSIUM BLD-SCNC: 3.8 MMOL/L (ref 3.5–5.2)
PROT SERPL-MCNC: 6.7 G/DL (ref 6–8.5)
RBC # BLD AUTO: 3.01 10*6/MM3 (ref 3.77–5.28)
SODIUM BLD-SCNC: 137 MMOL/L (ref 136–145)
TIBC SERPL-MCNC: 246 MCG/DL (ref 298–536)
TRANSFERRIN SERPL-MCNC: 165 MG/DL (ref 200–360)
WBC NRBC COR # BLD: 6.07 10*3/MM3 (ref 3.4–10.8)

## 2020-02-09 PROCEDURE — 84466 ASSAY OF TRANSFERRIN: CPT | Performed by: INTERNAL MEDICINE

## 2020-02-09 PROCEDURE — 85025 COMPLETE CBC W/AUTO DIFF WBC: CPT | Performed by: FAMILY MEDICINE

## 2020-02-09 PROCEDURE — 80053 COMPREHEN METABOLIC PANEL: CPT | Performed by: FAMILY MEDICINE

## 2020-02-09 PROCEDURE — 84100 ASSAY OF PHOSPHORUS: CPT | Performed by: INTERNAL MEDICINE

## 2020-02-09 PROCEDURE — 83540 ASSAY OF IRON: CPT | Performed by: INTERNAL MEDICINE

## 2020-02-09 PROCEDURE — 82962 GLUCOSE BLOOD TEST: CPT

## 2020-02-09 RX ORDER — LEVOTHYROXINE SODIUM 0.03 MG/1
25 TABLET ORAL DAILY
Qty: 30 TABLET | Refills: 2 | Status: SHIPPED | OUTPATIENT
Start: 2020-02-09 | End: 2020-02-09

## 2020-02-09 RX ORDER — LEVOTHYROXINE SODIUM 0.03 MG/1
25 TABLET ORAL
Status: DISCONTINUED | OUTPATIENT
Start: 2020-02-09 | End: 2020-02-09 | Stop reason: HOSPADM

## 2020-02-09 RX ORDER — FERROUS SULFATE 325(65) MG
325 TABLET ORAL
Status: DISCONTINUED | OUTPATIENT
Start: 2020-02-09 | End: 2020-02-09 | Stop reason: HOSPADM

## 2020-02-09 RX ORDER — LEVOTHYROXINE SODIUM 0.03 MG/1
25 TABLET ORAL DAILY
Qty: 30 TABLET | Refills: 2 | Status: ON HOLD | OUTPATIENT
Start: 2020-02-09 | End: 2021-01-01

## 2020-02-09 RX ORDER — FERROUS SULFATE 325(65) MG
325 TABLET ORAL
Qty: 30 TABLET | Refills: 2 | Status: SHIPPED | OUTPATIENT
Start: 2020-02-09 | End: 2020-03-18

## 2020-02-09 RX ORDER — FERROUS SULFATE 325(65) MG
325 TABLET ORAL
Qty: 30 TABLET | Refills: 2 | Status: SHIPPED | OUTPATIENT
Start: 2020-02-09 | End: 2020-02-09

## 2020-02-09 RX ADMIN — SODIUM CHLORIDE, PRESERVATIVE FREE 10 ML: 5 INJECTION INTRAVENOUS at 08:00

## 2020-02-09 RX ADMIN — VITAMIN D 2000 UNITS: 25 TAB ORAL at 08:00

## 2020-02-09 RX ADMIN — ASPIRIN 81 MG: 81 TABLET ORAL at 07:59

## 2020-02-09 RX ADMIN — FAMOTIDINE 20 MG: 20 TABLET, FILM COATED ORAL at 08:01

## 2020-02-09 RX ADMIN — BRIMONIDINE TARTRATE 1 DROP: 2 SOLUTION OPHTHALMIC at 08:02

## 2020-02-09 RX ADMIN — SEVELAMER CARBONATE 800 MG: 800 TABLET, FILM COATED ORAL at 07:59

## 2020-02-09 RX ADMIN — CARVEDILOL 12.5 MG: 6.25 TABLET, FILM COATED ORAL at 07:59

## 2020-02-09 RX ADMIN — ACETAMINOPHEN 650 MG: 325 TABLET, FILM COATED ORAL at 01:01

## 2020-02-09 RX ADMIN — LOSARTAN POTASSIUM 50 MG: 50 TABLET, FILM COATED ORAL at 08:00

## 2020-02-09 RX ADMIN — DEXTROSE 15 G: 15 GEL ORAL at 02:43

## 2020-02-09 RX ADMIN — TIMOLOL MALEATE 1 DROP: 5 SOLUTION OPHTHALMIC at 06:27

## 2020-02-09 RX ADMIN — SERTRALINE HYDROCHLORIDE 25 MG: 25 TABLET ORAL at 08:00

## 2020-02-09 RX ADMIN — DEXTROSE 15 G: 15 GEL ORAL at 03:00

## 2020-02-09 RX ADMIN — ACETAMINOPHEN 650 MG: 325 TABLET, FILM COATED ORAL at 11:42

## 2020-02-09 RX ADMIN — DORZOLAMIDE HYDROCHLORIDE 1 DROP: 20 SOLUTION OPHTHALMIC at 07:59

## 2020-02-09 NOTE — PLAN OF CARE
Problem: Patient Care Overview  Goal: Plan of Care Review  Outcome: Ongoing (interventions implemented as appropriate)  Flowsheets  Taken 2/8/2020 0556 by Chichi Dan RN  Progress: no change  Taken 2/9/2020 0512 by Krissy Ortiz RN  Plan of Care Reviewed With: patient;family  Outcome Summary: BP elevated at times. C/O headache and elevated temp noted at times. Tylenol given as ordered. No c/o chest pain. Pt. continues to have hypoglycemic episodes with blood glucose dropping into the 40's. Glucose gel x2 and juice given. New IV started this shift. Safety maintained. Call light within reach. Will continue to monitor.

## 2020-02-09 NOTE — DISCHARGE SUMMARY
[facility] Medicine Services  DISCHARGE SUMMARY       Date of Admission: 2/7/2020  Date of Discharge:  2/9/2020  Primary Care Physician: Provider, No Known    Presenting Problem/History of Present Illness:  End stage renal disease (CMS/[facility]AC, located within [facility]) [N18.6]  End stage renal disease (CMS/LTAC, located within [facility]) [N18.6]     Final Discharge Diagnoses:  Active Hospital Problems    Diagnosis   • End stage renal disease (CMS/[facility], located within [facility])   • Anemia due to chronic kidney disease, on chronic dialysis (CMS/[facility], located within [facility])   • Type 2 diabetes mellitus, with long-term current use of insulin (CMS/[facility], located within [facility])   • End stage renal failure on dialysis (CMS/[facility], located within [facility])   • Diabetes (CMS/[facility]AC, located within [facility])   • HTN (hypertension)       Consults: Nephrology.    Procedures Performed: Dialysis.    Pertinent Test Results:   IMPRESSION: Chest x-ray.  Cardiomegaly with findings suggestive of pulmonary vascular  congestion and pulmonary edema.    Chief Complaint on Day of Discharge: none    History of Present Illness on Day of Discharge:   Patient is a 59-year-old  female came in complaining chills with possible uremic syndrome from missing dialysis.  Labs shows hyperkalemia.  Patient missed dialysis on Tuesday and Thursday.  Patient also complained chills sometimes since Tuesday.  Patient denies any fever.  Patient denies any chest pain or shortness of breath.  Patient with complaint of generalized weakness.    Hospital Course:  The patient is a 59 y.o. female who presented to [facility] with food overload/hyperkalemia.    Fluid overload. 3L removed today with dialysis.   Patient is not requiring oxygen.  No sign of orthopnea.     Hyperkalemia.  Resolved.      End-stage renal disease.  On dialysis.  Nephrology consult.  Patient used to get dialysis Tuesday Thursday and Saturday.  Patient has missed Tuesday and Thursday.  Patient to continue calcitriol, vitamin D, and Renagel.   Patient to continue dialysis Tuesday Thursday and Saturday.  Patient need to  "keep her appointment on a regular basis.     Anemia.  Secondary to end-stage renal failure.  Start patient iron supplement daily.     Diabetes.  Sliding scale.  Patient to continue Levemir.  Hemoglobin A1c 5.1.     CAD/hypertension/hyperlipidemia.  Patient to continue aspirin, Core, Cozaar.  DC Midodrin.       High TSH.  Free T4- low. Start synthroid.     Reflux.  Pt to continue Pepcid.  Zofran as needed.     Depression.  Pt to continue Zoloft.     Vital signs stable, afebrile.  Plan to discharge patient home today.  Follow with PCP 1 week.  Follow with dialysis schedule appointment Tuesday Thursday and Saturday     Condition on Discharge:  stable    Physical Exam on Discharge:  /42 (BP Location: Right arm, Patient Position: Lying)   Pulse 80   Temp 98.3 °F (36.8 °C) (Oral)   Resp 16   Ht 149.9 cm (59\")   Wt 62.1 kg (137 lb)   SpO2 98%   BMI 27.67 kg/m²   Physical Exam  Constitutional: She is oriented to person, place, and time. She appears well-developed.   HENT:   Head: Normocephalic.   Eyes: Pupils are equal, round, and reactive to light. Conjunctivae are normal.   Neck: Neck supple. No JVD present.   Cardiovascular: Normal rate, regular rhythm, normal heart sounds and intact distal pulses. Exam reveals no gallop and no friction rub.   No murmur heard.  Pulmonary/Chest: Effort normal and breath sounds normal. No respiratory distress. She has no wheezes. She has no rales. She exhibits no tenderness.   Abdominal: Soft. Bowel sounds are normal. She exhibits no distension. There is no tenderness. There is no rebound and no guarding.   Musculoskeletal: She exhibits no edema, tenderness or deformity.   Neurological: She is alert and oriented to person, place, and time. She displays normal reflexes. No cranial nerve deficit. She exhibits abnormal muscle tone. Coordination abnormal.   Skin: Skin is warm and dry. Capillary refill takes 2 to 3 seconds. No rash noted.   Psychiatric: She has a normal mood and " affect. Her behavior is normal.   Nursing note and vitals reviewed.    Discharge Disposition:  Home or Self Care    Discharge Medications:     Discharge Medications      New Medications      Instructions Start Date   ferrous sulfate 325 (65 FE) MG tablet   325 mg, Oral, Daily With Breakfast      levothyroxine 25 MCG tablet  Commonly known as:  SYNTHROID, LEVOTHROID   25 mcg, Oral, Daily         Continue These Medications      Instructions Start Date   aspirin 81 MG EC tablet   81 mg, Oral, Daily      brimonidine 0.2 % ophthalmic solution  Commonly known as:  ALPHAGAN   1 drop, Left Eye, 3 Times Daily      calcitriol 0.25 MCG capsule  Commonly known as:  ROCALTROL   0.25 mcg, Oral, Take As Directed, Only given on dialysis days (Tuesday, Thursday, Saturday)      carvedilol 12.5 MG tablet  Commonly known as:  COREG   12.5 mg, Oral, 2 Times Daily With Meals      cholecalciferol 25 MCG (1000 UT) tablet  Commonly known as:  VITAMIN D3   2,000 Units, Oral, Daily      dorzolamide 2 % ophthalmic solution  Commonly known as:  TRUSOPT   1 drop, Left Eye, 2 Times Daily      famotidine 20 MG tablet  Commonly known as:  PEPCID   20 mg, Oral, 2 Times Daily      fluticasone 50 MCG/ACT nasal spray  Commonly known as:  FLONASE   2 sprays, Nasal, 2 Times Daily      homatropine 5 % ophthalmic solution   1 drop, Left Eye, Daily      insulin glargine 100 UNIT/ML injection  Commonly known as:  LANTUS   10 Units, Subcutaneous, Nightly      lidocaine-prilocaine 2.5-2.5 % cream  Commonly known as:  EMLA   1 application, Topical, Take As Directed, Apply to access area 30 minutes prior to dialysis.      losartan 50 MG tablet  Commonly known as:  COZAAR   50 mg, Oral, Daily      pravastatin 10 MG tablet  Commonly known as:  PRAVACHOL   10 mg, Oral, Nightly      RHOPRESSA 0.02 % solution  Generic drug:  Netarsudil Dimesylate   1 drop, Left Eye, Nightly      sertraline 25 MG tablet  Commonly known as:  ZOLOFT   25 mg, Oral, Daily      sevelamer  800 MG tablet  Commonly known as:  RENAGEL   2,400 mg, Oral, 3 Times Daily With Meals      timolol 0.5 % ophthalmic solution  Commonly known as:  TIMOPTIC   1 drop, Left Eye, Every Morning      TRAVATAN Z 0.004 % solution ophthalmic solution  Generic drug:  travoprost (JUDE free)   1 drop, Left Eye, Nightly      VENTOLIN  (90 Base) MCG/ACT inhaler  Generic drug:  albuterol sulfate HFA   2 puffs, Inhalation, Every 4 Hours PRN         Stop These Medications    cetirizine 10 MG tablet  Commonly known as:  zyrTEC     midodrine 5 MG tablet  Commonly known as:  PROAMATINE     nicotine 21 MG/24HR patch  Commonly known as:  NICODERM CQ            Discharge Diet:   Diet Instructions     Advance Diet As Tolerated            Activity at Discharge:   Activity Instructions     Activity as Tolerated            Discharge Care Plan/Instructions:     Follow-up Appointments:   Follow with PCP in 1 week.  Follow with dialysis Tuesday Thursday Saturday.    Lyndon Lockett MD  02/09/20  10:02 AM    Time: Greater than 30 minutes.

## 2020-02-09 NOTE — PROGRESS NOTES
[facility]  INPATIENT PROGRESS NOTE    Length of Stay: 1  Date of Admission: 2/7/2020  Primary Care Physician: Provider, No Known    Subjective   Chief Complaint: Acute renal failure.  Weakness.    HPI   3 L removed from dialysis today.  Hyperkalemia resolved.  Patient is doing well.  Patient denies any chest pain or shortness of breath.    Review of Systems   Constitutional: Positive for activity change, appetite change, chills and fatigue. Negative for fever.   HENT: Negative for hearing loss, nosebleeds, tinnitus and trouble swallowing.    Eyes: Negative for visual disturbance.   Respiratory: Negative for cough, chest tightness, shortness of breath and wheezing.    Cardiovascular: Negative for chest pain, palpitations and leg swelling.   Gastrointestinal: Negative for abdominal distention, abdominal pain, blood in stool, constipation, diarrhea, nausea and vomiting.   Endocrine: Negative for cold intolerance, heat intolerance, polydipsia, polyphagia and polyuria.   Genitourinary: Negative for decreased urine volume, difficulty urinating, dysuria, flank pain, frequency and hematuria.   Musculoskeletal: Positive for arthralgias, gait problem and myalgias. Negative for joint swelling.   Skin: Negative for rash.   Allergic/Immunologic: Negative for immunocompromised state.   Neurological: Positive for weakness. Negative for dizziness, syncope, light-headedness and headaches.   Hematological: Negative for adenopathy. Does not bruise/bleed easily.   Psychiatric/Behavioral: Negative for confusion and sleep disturbance. The patient is not nervous/anxious.      All pertinent negatives and positives are as above. All other systems have been reviewed and are negative unless otherwise stated.     Objective    Temp:  [98 °F (36.7 °C)-100 °F (37.8 °C)] 98 °F (36.7 °C)  Heart Rate:  [82-88] 83  Resp:  [18-20] 18  BP: (130-164)/(59-83) 130/70    Intake/Output Summary (Last 24  hours) at 2/8/2020 1831  Last data filed at 2/8/2020 1800  Gross per 24 hour   Intake 240 ml   Output 3000 ml   Net -2760 ml     Physical Exam  Constitutional: She is oriented to person, place, and time. She appears well-developed.   HENT:   Head: Normocephalic.   Eyes: Pupils are equal, round, and reactive to light. Conjunctivae are normal.   Neck: Neck supple. No JVD present.   Cardiovascular: Normal rate, regular rhythm, normal heart sounds and intact distal pulses. Exam reveals no gallop and no friction rub.   No murmur heard.  Pulmonary/Chest: Effort normal and breath sounds normal. No respiratory distress. She has no wheezes. She has no rales. She exhibits no tenderness.   Abdominal: Soft. Bowel sounds are normal. She exhibits no distension. There is no tenderness. There is no rebound and no guarding.   Musculoskeletal: She exhibits no edema, tenderness or deformity.   Neurological: She is alert and oriented to person, place, and time. She displays normal reflexes. No cranial nerve deficit. She exhibits abnormal muscle tone. Coordination abnormal.   Skin: Skin is warm and dry. Capillary refill takes 2 to 3 seconds. No rash noted.   Psychiatric: She has a normal mood and affect. Her behavior is normal.   Nursing note and vitals reviewed.  Results Review:  Lab Results (last 24 hours)     Procedure Component Value Units Date/Time    POC Glucose Once [[id number]]  (Normal) Collected:  02/08/20 1749    Specimen:  Blood Updated:  02/08/20 1802     Glucose 90 mg/dL      Comment: : [id number] Rudy Michelle ID: [id number]       Blood Culture - Blood, Arm, Right [[id number]] Collected:  02/07/20 1422    Specimen:  Blood from Arm, Right Updated:  02/08/20 1501     Blood Culture No growth at 24 hours    Blood Culture - Blood, Arm, Right [[id number]] Collected:  02/07/20 1415    Specimen:  Blood from Arm, Right Updated:  02/08/20 1501     Blood Culture No growth at 24 hours    POC Glucose Once [[id number]]   (Abnormal) Collected:  02/08/20 1214    Specimen:  Blood Updated:  02/08/20 1231     Glucose 314 mg/dL      Comment: : [id number] Rudy Michelle ID: [id number]       Hepatitis B Surface Antibody [[id number]] Collected:  02/08/20 1053    Specimen:  Blood Updated:  02/08/20 1141    Hepatitis B Core Antibody, IgM [[id number]]  (Normal) Collected:  02/08/20 1053    Specimen:  Blood Updated:  02/08/20 1141     Hep B C IgM Non-Reactive    Narrative:       Results may be falsely decreased if patient taking Biotin.      Hepatitis B Surface Antigen [[id number]] Collected:  02/08/20 1053    Specimen:  Blood Updated:  02/08/20 1140    Narrative:       The following orders were created for panel order Hepatitis B Surface Antigen.  Procedure                               Abnormality         Status                     ---------                               -----------         ------                     Hepatitis B Surface Antigen[[id number]]  Normal              Final result               Hep B Confirmation Tube[[id number]]                          Final result                 Please view results for these tests on the individual orders.    Hepatitis B Surface Antigen [[id number]]  (Normal) Collected:  02/08/20 1053    Specimen:  Blood Updated:  02/08/20 1140     Hepatitis B Surface Ag Non-Reactive    Narrative:       Results may be falsely decreased if patient taking Biotin.      Hep B Confirmation Tube [[id number]] Collected:  02/08/20 1053    Specimen:  Blood Updated:  02/08/20 1125     Extra Tube Hold    Extra Tubes [[id number]] Collected:  02/08/20 0607    Specimen:  Blood, Venous Line Updated:  02/08/20 0846    Narrative:       The following orders were created for panel order Extra Tubes.  Procedure                               Abnormality         Status                     ---------                               -----------         ------                     Green Top (Gel)[[id number]]                                   Final result                 Please view results for these tests on the individual orders.    Green Top (Gel) [[id number]] Collected:  02/08/20 0607    Specimen:  Blood Updated:  02/08/20 0846     Extra Tube Hold for add-ons.     Comment: Auto resulted.       POC Glucose Once [[id number]]  (Abnormal) Collected:  02/08/20 0816    Specimen:  Blood Updated:  02/08/20 0836     Glucose 195 mg/dL      Comment: : [id number] Jose C WallaceMeter ID: [id number]       Basic Metabolic Panel [[id number]]  (Abnormal) Collected:  02/08/20 0607    Specimen:  Blood Updated:  02/08/20 0817     Glucose 33 mg/dL      BUN 74 mg/dL      Creatinine 11.35 mg/dL      Sodium 140 mmol/L      Potassium 4.4 mmol/L      Chloride 97 mmol/L      CO2 21.0 mmol/L      Calcium 9.5 mg/dL      eGFR   Amer --     Comment: <15 Indicative of kidney failure.        eGFR Non African Amer 3 mL/min/1.73      Comment: <15 Indicative of kidney failure.        BUN/Creatinine Ratio 6.5     Anion Gap 22.0 mmol/L     Narrative:       GFR Normal >60  Chronic Kidney Disease <60  Kidney Failure <15      Hemoglobin A1c [[id number]]  (Normal) Collected:  02/08/20 0609    Specimen:  Blood Updated:  02/08/20 0649     Hemoglobin A1C 5.10 %     Narrative:       Hemoglobin A1C Ranges:    Increased Risk for Diabetes  5.7% to 6.4%  Diabetes                     >= 6.5%  Diabetic Goal                < 7.0%    Troponin [[id number]]  (Abnormal) Collected:  02/08/20 0537    Specimen:  Blood Updated:  02/08/20 0635     Troponin T 0.032 ng/mL     Narrative:       Troponin T Reference Range:  <= 0.03 ng/mL-   Negative for AMI  >0.03 ng/mL-     Abnormal for myocardial necrosis.  Clinicians would have to utilize clinical acumen, EKG, Troponin and serial changes to determine if it is an Acute Myocardial Infarction or myocardial injury due to an underlying chronic condition.       Results may be falsely decreased if patient taking Biotin.      Comprehensive Metabolic Panel [[id number]]   (Abnormal) Collected:  02/08/20 0537    Specimen:  Blood Updated:  02/08/20 0634     Glucose 41 mg/dL      BUN 72 mg/dL      Creatinine 11.92 mg/dL      Sodium 138 mmol/L      Potassium 4.9 mmol/L      Chloride 97 mmol/L      CO2 19.0 mmol/L      Calcium 9.3 mg/dL      Total Protein 6.8 g/dL      Albumin 3.80 g/dL      ALT (SGPT) 16 U/L      AST (SGOT) 24 U/L      Alkaline Phosphatase 68 U/L      Total Bilirubin 0.4 mg/dL      eGFR Non African Amer 3 mL/min/1.73      Comment: <15 Indicative of kidney failure.        eGFR   Amer --     Comment: <15 Indicative of kidney failure.        Globulin 3.0 gm/dL      A/G Ratio 1.3 g/dL      BUN/Creatinine Ratio 6.0     Anion Gap 22.0 mmol/L     Narrative:       GFR Normal >60  Chronic Kidney Disease <60  Kidney Failure <15      POC Glucose Once [[id number]]  (Normal) Collected:  02/08/20 0620    Specimen:  Blood Updated:  02/08/20 0632     Glucose 83 mg/dL      Comment: : [id number] Sy (Chauncey) YrisaMeter ID: [id number]       Lipid Panel [[id number]] Collected:  02/08/20 0537    Specimen:  Blood Updated:  02/08/20 0626     Total Cholesterol 130 mg/dL      Triglycerides 84 mg/dL      HDL Cholesterol 53 mg/dL      LDL Cholesterol  60 mg/dL      VLDL Cholesterol 16.8 mg/dL      LDL/HDL Ratio 1.14    Narrative:       Cholesterol Reference Ranges  (U.S. Department of Health and Human Services ATP III Classifications)    Desirable          <200 mg/dL  Borderline High    200-239 mg/dL  High Risk          >240 mg/dL      Triglyceride Reference Ranges  (U.S. Department of Health and Human Services ATP III Classifications)    Normal           <150 mg/dL  Borderline High  150-199 mg/dL  High             200-499 mg/dL  Very High        >500 mg/dL    HDL Reference Ranges  (U.S. Department of Health and Human Services ATP III Classifcations)    Low     <40 mg/dl (major risk factor for CHD)  High    >60 mg/dl ('negative' risk factor for CHD)        LDL Reference  Ranges  (U.S. Department of Health and Human Services ATP III Classifcations)    Optimal          <100 mg/dL  Near Optimal     100-129 mg/dL  Borderline High  130-159 mg/dL  High             160-189 mg/dL  Very High        >189 mg/dL    TSH [[id number]]  (Abnormal) Collected:  02/08/20 0537    Specimen:  Blood Updated:  02/08/20 0626     TSH 5.580 uIU/mL     POC Glucose Once [[id number]]  (Abnormal) Collected:  02/08/20 0605    Specimen:  Blood Updated:  02/08/20 0621     Glucose 46 mg/dL      Comment: : [id number] Sy (Chauncey) KristinaMeter ID: [id number]       CBC Auto Differential [[id number]]  (Abnormal) Collected:  02/08/20 0606    Specimen:  Blood Updated:  02/08/20 0618     WBC 5.70 10*3/mm3      RBC 3.03 10*6/mm3      Hemoglobin 9.5 g/dL      Hematocrit 29.0 %      MCV 95.7 fL      MCH 31.4 pg      MCHC 32.8 g/dL      RDW 15.1 %      RDW-SD 53.1 fl      MPV 9.4 fL      Platelets 175 10*3/mm3      Neutrophil % 58.3 %      Lymphocyte % 30.0 %      Monocyte % 8.8 %      Eosinophil % 2.5 %      Basophil % 0.2 %      Immature Grans % 0.2 %      Neutrophils, Absolute 3.33 10*3/mm3      Lymphocytes, Absolute 1.71 10*3/mm3      Monocytes, Absolute 0.50 10*3/mm3      Eosinophils, Absolute 0.14 10*3/mm3      Basophils, Absolute 0.01 10*3/mm3      Immature Grans, Absolute 0.01 10*3/mm3      nRBC 0.0 /100 WBC     POC Glucose Once [[id number]]  (Abnormal) Collected:  02/08/20 0552    Specimen:  Blood Updated:  02/08/20 0605     Glucose 44 mg/dL      Comment: : [id number] Sy ([place]) KristinaMeter ID: [id number]       POC Glucose Once [[id number]]  (Abnormal) Collected:  02/08/20 0543    Specimen:  Blood Updated:  02/08/20 0604     Glucose 45 mg/dL      Comment: : [id number] Jeffrey Gerard ID: [id number]       Troponin [[id number]]  (Normal) Collected:  02/08/20 0003    Specimen:  Blood Updated:  02/08/20 0034     Troponin T 0.028 ng/mL     Narrative:       Troponin T Reference Range:  <= 0.03 ng/mL-    Negative for AMI  >0.03 ng/mL-     Abnormal for myocardial necrosis.  Clinicians would have to utilize clinical acumen, EKG, Troponin and serial changes to determine if it is an Acute Myocardial Infarction or myocardial injury due to an underlying chronic condition.       Results may be falsely decreased if patient taking Biotin.      POC Glucose Once [[id number]]  (Normal) Collected:  02/07/20 2136    Specimen:  Blood Updated:  02/07/20 2152     Glucose 97 mg/dL      Comment: : [id number] Sy Clark ([place]) ID: [id number]              Cultures:  Blood Culture   Date Value Ref Range Status   02/07/2020 No growth at 24 hours  Preliminary   02/07/2020 No growth at 24 hours  Preliminary       Radiology Data:    Imaging Results (Last 24 Hours)     ** No results found for the last 24 hours. **          Allergies   Allergen Reactions   • Bupropion Nausea Only   • Chantix [Varenicline]    • Sulfa Antibiotics    • Azithromycin Rash   • Levofloxacin Rash   • Penicillins Rash       Scheduled meds:     aspirin 81 mg Oral Daily   brimonidine 1 drop Left Eye TID   calcitriol 0.25 mcg Oral Take As Directed   carvedilol 12.5 mg Oral BID With Meals   cholecalciferol 2,000 Units Oral Daily   dorzolamide 1 drop Left Eye BID   [START ON 2/9/2020] famotidine 20 mg Oral Daily   insulin detemir 10 Units Subcutaneous Nightly   insulin lispro 2-7 Units Subcutaneous 4x Daily With Meals & Nightly   latanoprost 1 drop Left Eye Nightly   losartan 50 mg Oral Daily   pravastatin 10 mg Oral Nightly   sertraline 25 mg Oral Daily   sevelamer 800 mg Oral TID With Meals   sodium chloride 10 mL Intravenous Q12H   timolol 1 drop Left Eye QAM       PRN meds:  •  acetaminophen  •  dextrose  •  dextrose  •  glucagon (human recombinant)  •  ipratropium-albuterol  •  ondansetron  •  sodium chloride    Assessment/Plan       End stage renal failure on dialysis (CMS/[facility])    Diabetes (CMS/[facility])    HTN (hypertension)    Type 2 diabetes mellitus,  with long-term current use of insulin (CMS/[facility])    Anemia due to chronic kidney disease, on chronic dialysis (CMS/[facility])    End stage renal disease (CMS/[facility])      Plan:  Fluid overload. 3L removed today with dialysis.      Hyperkalemia.  Resolved.      End-stage renal disease.  On dialysis.  Nephrology consult.  Patient used to get dialysis  and Saturday.  Patient has missed Tuesday and Thursday.  Continue calcitriol.  Continue vitamin D.  Continue Renagel.     Anemia.  Secondary to end-stage renal failure.       Diabetes.  Sliding scale.  Continue Levemir.  Hemoglobin A1c 5.1.     CAD/hypertension/hyperlipidemia.  Continue aspirin.  Continue Coreg.  Continue Cozaar.  Hold Midodrin.  Continue protocol.    High TSH.  Free T4.     Reflux.  Pepcid.  Zofran as needed.     Depression.  Continue Zoloft.     Discharge Plannin-3 days.    Lyndon Lockett MD   20   6:31 PM

## 2020-02-10 ENCOUNTER — READMISSION MANAGEMENT (OUTPATIENT)
Dept: CALL CENTER | Facility: HOSPITAL | Age: 60
End: 2020-02-10

## 2020-02-10 ENCOUNTER — TELEPHONE (OUTPATIENT)
Dept: INTERNAL MEDICINE | Age: 60
End: 2020-02-10

## 2020-02-10 NOTE — OUTREACH NOTE
Prep Survey      Responses   Facility patient discharged from?  [place]   Is patient eligible?  Yes   Discharge diagnosis  ESRD, anemia d/t CKD, IDDM II, HTN    Does the patient have one of the following disease processes/diagnoses(primary or secondary)?  Other   Does the patient have Home health ordered?  No   Is there a DME ordered?  No   Comments regarding appointments  Pt will be notified of Appointments    General alerts for this patient  HD pt    Prep survey completed?  Yes          Soledad Schwartz RN

## 2020-02-10 NOTE — TELEPHONE ENCOUNTER
[facility] Initial Follow Up Call    Outreach made within 2 business days of discharge: Yes    Patient: Taylor Sebastian Patient : 1960   MRN: [id number]  Reason for Admission: Patient was admitted on 2020 due to end stage renal disease. Presenting Problem/History of Present Illness:  End stage renal disease (CMS/[facility]) [N18.6]  End stage renal disease (CMS/[facility]) [N18.6]     Final Discharge Diagnoses: Active Hospital Problems   Diagnosis    End stage renal disease (CMS/[facility])    Anemia due to chronic kidney disease, on chronic dialysis (CMS/[facility])    Type 2 diabetes mellitus, with long-term current use of insulin (CMS/[facility])    End stage renal failure on dialysis (CMS/[facility])    Diabetes (CMS/[facility])    HTN (hypertension)      Discharge Date: 2020       Spoke with: patient. Discharge department/facility: [address] Interactive Patient Contact:  Was patient able to fill all prescriptions: No: Patient states she still needs to  Iron pill. Was patient instructed to bring all medications to the follow-up visit: Yes  Is patient taking all medications as directed in the discharge summary? Yes  Does patient understand their discharge instructions: Yes  Does patient have questions or concerns that need addressed prior to 7-14 day follow up office visit: Per patient she states she is doing OK. Still has productive cough with white phlegm that she had in hospital.  Patient to go to dialysis tomorrow. Bowels with some constipation since taking Imodium last week. Appetite is good. Slept well. Patient to bring all medications to HFU and will contact office with any concerns. Patient did cancel OV scheduled this week with Whit Carlos as Hfu was scheduled to see Deedee Nielsen for Friday. Patient plans to have vascular study done Wed as scheduled.     Scheduled appointment with PCP within 7-14 days    Follow Up  Future Appointments   Date Time

## 2020-02-11 ENCOUNTER — READMISSION MANAGEMENT (OUTPATIENT)
Dept: CALL CENTER | Facility: HOSPITAL | Age: 60
End: 2020-02-11

## 2020-02-11 NOTE — OUTREACH NOTE
Medical Week 1 Survey      Responses   Facility patient discharged from?  [place]   Does the patient have one of the following disease processes/diagnoses(primary or secondary)?  Other   Is there a successful TCM telephone encounter documented?  No   Week 1 attempt successful?  Yes   Call start time  1746   Call end time  1751   Discharge diagnosis  ESRD, anemia d/t CKD, IDDM II, HTN    Meds reviewed with patient/caregiver?  Yes   Is the patient having any side effects they believe may be caused by any medication additions or changes?  No   Does the patient have all medications ordered at discharge?  No   What is keeping the patient from filling the prescriptions?  -- [ferrous ER on order from pharmacy]   Nursing Interventions  No intervention needed   Is the patient taking all medications as directed (includes completed medication regime)?  Yes   Does the patient have a primary care provider?   Yes   Does the patient have an appointment with their PCP within 7 days of discharge?  Yes   Has the patient kept scheduled appointments due by today?  Yes   Has home health visited the patient within 72 hours of discharge?  N/A   Psychosocial issues?  No   Did the patient receive a copy of their discharge instructions?  Yes   Nursing interventions  Reviewed instructions with patient   What is the patient's perception of their health status since discharge?  Improving   Is the patient/caregiver able to teach back signs and symptoms related to disease process for when to call PCP?  Yes   Is the patient/caregiver able to teach back signs and symptoms related to disease process for when to call 911?  Yes   Is the patient/caregiver able to teach back the hierarchy of who to call/visit for symptoms/problems? PCP, Specialist, Home health nurse, Urgent Care, ED, 911  Yes   Additional teach back comments  pt states had to leave dialysis early today due to incontinence, plans to return on 2/13/20 , states is aware of watching for  signs of electrolyte imbalance , etc, has scale and weighs daily   Week 1 call completed?  Yes          Heike Mansfield RN

## 2020-02-12 LAB
BACTERIA SPEC AEROBE CULT: NORMAL
BACTERIA SPEC AEROBE CULT: NORMAL

## 2020-02-14 ENCOUNTER — OFFICE VISIT (OUTPATIENT)
Dept: PRIMARY CARE CLINIC | Age: 60
End: 2020-02-14
Payer: MEDICARE

## 2020-02-14 VITALS
HEIGHT: 59 IN | BODY MASS INDEX: 27.21 KG/M2 | RESPIRATION RATE: 18 BRPM | TEMPERATURE: 97.2 F | DIASTOLIC BLOOD PRESSURE: 80 MMHG | WEIGHT: 135 LBS | SYSTOLIC BLOOD PRESSURE: 122 MMHG | OXYGEN SATURATION: 99 % | HEART RATE: 79 BPM

## 2020-02-14 PROCEDURE — 1111F DSCHRG MED/CURRENT MED MERGE: CPT | Performed by: NURSE PRACTITIONER

## 2020-02-14 PROCEDURE — 99214 OFFICE O/P EST MOD 30 MIN: CPT | Performed by: NURSE PRACTITIONER

## 2020-02-14 PROCEDURE — G8427 DOCREV CUR MEDS BY ELIG CLIN: HCPCS | Performed by: NURSE PRACTITIONER

## 2020-02-14 PROCEDURE — G8484 FLU IMMUNIZE NO ADMIN: HCPCS | Performed by: NURSE PRACTITIONER

## 2020-02-14 PROCEDURE — G8417 CALC BMI ABV UP PARAM F/U: HCPCS | Performed by: NURSE PRACTITIONER

## 2020-02-14 PROCEDURE — 4004F PT TOBACCO SCREEN RCVD TLK: CPT | Performed by: NURSE PRACTITIONER

## 2020-02-14 PROCEDURE — 99406 BEHAV CHNG SMOKING 3-10 MIN: CPT | Performed by: NURSE PRACTITIONER

## 2020-02-14 PROCEDURE — 3017F COLORECTAL CA SCREEN DOC REV: CPT | Performed by: NURSE PRACTITIONER

## 2020-02-14 RX ORDER — CARVEDILOL 12.5 MG/1
12.5 TABLET ORAL 2 TIMES DAILY WITH MEALS
COMMUNITY

## 2020-02-14 ASSESSMENT — ENCOUNTER SYMPTOMS
NAUSEA: 1
EYES NEGATIVE: 1
RESPIRATORY NEGATIVE: 1

## 2020-02-14 NOTE — PROGRESS NOTES
[facility] PRIMARY CARE  [address] [facility] [address] [address]  Dept: [phone]  Dept Fax: [phone]  Loc: [phone]    Juan Burns is a 62 y.o. female who presents today for her medical conditions/complaints as noted below. Juan Burns is c/o of Follow-Up from Hospital (Pt states she is not feeling well she states that she feels like she is being over medicated and brought all of her medication to discuss with you today. Pt c/o cough states she had it while in the hospital but everytime they said something she was \"ignored\" )      Chief Complaint   Patient presents with   [address] from [facility] states she is not feeling well she states that she feels like she is being over medicated and brought all of her medication to discuss with you today. Pt c/o cough states she had it while in the hospital but everytime they said something she was \"ignored\"        HPI:     HPI  Patient here for hospital follow up. Patient had fluid overload from missing dialysis. She has restarted dialysis and is having it Tues, Thurs, and Saturday. Patient does believe that the hospital did overmedicate her at discharge. She was started on new meds and is now having increased nausea as well. Echo did show moderate mitral regurg. EF was 55%.     Past Medical History:   Diagnosis Date    Blind right eye     partial vision loss in the L eye too, due to DM    Blindness of one eye     Right     CKD (chronic kidney disease), stage IV (HCC)     secondary to diabetic nephropathy    Diabetes ([facility] [address]     Diabetes mellitus with ophthalmic manifestations     dx'd in 2000 but likely ongoing before that, was dx'd when she went blind in the R eye    Glaucoma     Hemodialysis patient ([facility] [address]     dialysis tues, thurs, sat. [address], [facility]    Hypertension     2000, dx'd at same time as the DM    Obesity     Renal osteodystrophy     Smoker     Type II or unspecified type diabetes mellitus with renal manifestations, uncontrolled(250.42)         Past Surgical History:   Procedure Laterality Date    CARPAL TUNNEL RELEASE      CATARACT REMOVAL       SECTION      , , and     CHOLECYSTECTOMY      COLONOSCOPY N/A 3/9/2018    Dr ReevesKowkly-Fqjefpskrmgorj-Mhgqlrwncphez AP (-) dysplasia x 1, tubular AP x  (-) dysplasia x 1, HP x 4--1 yr recall    DIALYSIS FISTULA CREATION Left 4/23/15 \[facility]\""    Left proximal ulnar artery to cephalic vein AVF creation    EYE SURGERY      laser, several times     TYMPANOSTOMY TUBE PLACEMENT Bilateral     VASCULAR SURGERY Left 5/11/15 S    US-guided cannulation left distal upper arm cephalic vein with 4-St Helenian glide sheath; LUE AV f'grams with venography SVC; left cephalic vein BAM with [id number] julieth balloon; completion venogram [facility]    VASCULAR SURGERY Left 5/28/15 S    Percutaneous cannulation left distal radial artery with 4-St Helenian glide sheath; LUE AV f'grams with venography SVC; left cephalic vein balloon a'plasty with [id number] julieth balloon and [id number] julieth balloon; proximal and mid upper arm cephalic vein BAM with [id number] julieth balloon; completion venogram [facility]    VASCULAR SURGERY Left 6/15/15 \[facility]\""    US-guided cannulation left cephalic vein with 4-St Helenian and later 7-St Helenian sheath; LUE AV f'grams including venography SVC; left cephalic vein stenosis balloon a'plasty with [id number] cutting balloon; completion f'gram and venogram [facility]    VASCULAR SURGERY  05/15/2017    \[facility]". Left upper fistulograms/venograms.  VASCULAR SURGERY  2018    S. Left upper fistulograms, left subclavian BA 12x40 atlas, left cephalic arch BA 13H25 conquest.    VASCULAR SURGERY  2019    S. Left upper fistulograms,BA left SCV 8x40 conquest,stent left SCV, viabahn 13x50,left SCV stent BA 12x40 conquest.       Social History     Tobacco Use    Smoking status: Current Every Day Smoker     Packs/day: 1.00 Years: 40.00     Pack years: 40.00    Smokeless tobacco: Never Used   Substance Use Topics    Alcohol use: No     Alcohol/week: 0.0 standard drinks        Current Outpatient Medications   Medication Sig Dispense Refill    carvedilol (COREG) 12.5 MG tablet Take 12.5 mg by mouth 2 times daily (with meals)      insulin glargine (LANTUS SOLOSTAR) 100 UNIT/ML injection pen INJECT 10 UNITS INTO THE SKIN ONCE DAILY 15 mL 0    losartan (COZAAR) 50 MG tablet Take 1 tablet by mouth daily (Patient taking differently: Take 100 mg by mouth daily ) 30 tablet 5    lactulose (CHRONULAC) 10 GM/15ML solution TAKE 30 ML BY MOUTH THREE TIMES DAILY 8108 mL 1    ipratropium-albuterol (DUONEB) 0.5-2.5 (3) MG/3ML SOLN nebulizer solution Inhale 3 mLs into the lungs every 4 hours 360 mL 1    famotidine (PEPCID) 20 MG tablet TAKE 1 TABLET BY MOUTH TWICE DAILY 60 tablet 11    B-D ULTRAFINE III SHORT PEN 31G X 8 MM MISC USE ONCE DAILY TO INJECT INSULIN DAILY 100 each 3    Cholecalciferol (VITAMIN D3) 1000 units CAPS Take by mouth      Travoprost, ALLISON Free, (TRAVATAN Z) 0.004 % SOLN ophthalmic solution 1 drop nightly      midodrine (PROAMATINE) 5 MG tablet TK 1 T PO HALF WAY THROUGH TREATMENT AS DIRECTED      sevelamer (RENVELA) 800 MG tablet Take 1 tablet by mouth daily       aspirin EC 81 MG EC tablet Take 81 mg by mouth daily      FREESTYLE LITE strip USE TO TEST BLOOD SUGAR TWICE DAILY 100 strip 0    Cetirizine HCl (ZYRTEC ALLERGY PO) Take 10 mg by mouth daily       fluticasone (FLONASE) 50 MCG/ACT nasal spray 2 sprays by Nasal route daily. To keep ears opened up 1 Bottle 11    Blood Glucose Monitoring Suppl NIKOS by Does not apply route. Pt will also need new lancet device if not included in kit. 1 Device 0    Glucose Blood (BLOOD GLUCOSE TEST STRIPS) STRP Check blood sugar twice a day for recent medication adjustments. 100 strip 11    bimatoprost 0.01 % SOLN Apply 1 drop to eye nightly.  One drop in left eye at [facility] Insulin Syringe-Needle U-100 (ACCUSURE INS SYR 1CC/31GX5/16\") 31G X 5/16\" 1 ML MISC by Does not apply route. 100 each 3    timolol (TIMOPTIC-XR) 0.5 % ophthalmic gel-forming Place 1 drop into the left eye 2 times daily       dorzolamide (TRUSOPT) 2 % ophthalmic solution Place 1 drop into the left eye 2 times daily       brimonidine (ALPHAGAN P) 0.15 % ophthalmic solution Place 1 drop into the left eye 3 times daily       homatropine 5 % ophthalmic solution Place 1 drop into the left eye daily       sertraline (ZOLOFT) 25 MG tablet Take 1 tablet by mouth daily 30 tablet 11    pravastatin (PRAVACHOL) 10 MG tablet TAKE 1 TABLET BY MOUTH EVERY NIGHT AT BEDTIME 30 tablet 11    blood glucose monitor kit and supplies 1 kit by Other route 3 times daily Test three times a day & as needed for symptoms of irregular blood glucose. 1 kit 0     No current facility-administered medications for this visit. Allergies   Allergen Reactions    Bupropion Nausea Only    Sulfa Antibiotics      Causes hypoglycemia     Varenicline     Wellbutrin [Bupropion Hcl] Nausea Only    Azithromycin Rash    Penicillins Rash       Family History   Problem Relation Age of Onset    Heart Disease Mother     Heart Attack Mother     Glaucoma Sister     Diabetes Sister     Diabetes Brother     Kidney Disease Brother     Blindness Brother     Stroke Maternal Grandmother     Stroke Maternal Grandfather     Colon Cancer Neg Hx     Colon Polyps Neg Hx     Liver Cancer Neg Hx     Liver Disease Neg Hx     Esophageal Cancer Neg Hx     Rectal Cancer Neg Hx     Stomach Cancer Neg Hx          Subjective:      Review of Systems   Constitutional: Negative. HENT: Negative. Eyes: Negative. Respiratory: Negative. Cardiovascular: Negative. Gastrointestinal: Positive for nausea. Endocrine: Negative. Genitourinary: Negative. Musculoskeletal: Negative. Skin: Negative. Neurological: Negative.     Hematological:

## 2020-02-19 ENCOUNTER — READMISSION MANAGEMENT (OUTPATIENT)
Dept: CALL CENTER | Facility: HOSPITAL | Age: 60
End: 2020-02-19

## 2020-02-26 ENCOUNTER — READMISSION MANAGEMENT (OUTPATIENT)
Dept: CALL CENTER | Facility: HOSPITAL | Age: 60
End: 2020-02-26

## 2020-02-26 NOTE — OUTREACH NOTE
Medical Week 3 Survey      Responses   Facility patient discharged from?  [facility]   Does the patient have one of the following disease processes/diagnoses(primary or secondary)?  Other   Week 3 attempt successful?  Yes   Call start time  1206   Call end time  1208   General alerts for this patient  HD pt    Discharge diagnosis  ESRD, anemia d/t CKD, IDDM II, HTN    Meds reviewed with patient/caregiver?  Yes   Is the patient having any side effects they believe may be caused by any medication additions or changes?  No   Does the patient have all medications ordered at discharge?  Yes   Is the patient taking all medications as directed (includes completed medication regime)?  Yes   Does the patient have a primary care provider?   Yes   Does the patient have an appointment with their PCP within 7 days of discharge?  Yes   Has the patient kept scheduled appointments due by today?  Yes   Psychosocial issues?  No   Did the patient receive a copy of their discharge instructions?  Yes   Nursing interventions  Reviewed instructions with patient   What is the patient's perception of their health status since discharge?  Same   Is the patient/caregiver able to teach back signs and symptoms related to disease process for when to call PCP?  Yes   Is the patient/caregiver able to teach back signs and symptoms related to disease process for when to call 911?  Yes   Is the patient/caregiver able to teach back the hierarchy of who to call/visit for symptoms/problems? PCP, Specialist, Home health nurse, Urgent Care, ED, 911  Yes   Week 3 Call Completed?  Yes   Wrap up additional comments   denies any concerns today.          Deana Hay, RN

## 2020-03-05 ENCOUNTER — READMISSION MANAGEMENT (OUTPATIENT)
Dept: CALL CENTER | Facility: HOSPITAL | Age: 60
End: 2020-03-05

## 2020-03-06 RX ORDER — LOSARTAN POTASSIUM 50 MG/1
50 TABLET ORAL DAILY
Qty: 30 TABLET | Refills: 5 | Status: SHIPPED | OUTPATIENT
Start: 2020-03-06 | End: 2020-03-17 | Stop reason: SDUPTHER

## 2020-03-06 NOTE — TELEPHONE ENCOUNTER
Requested Prescriptions     Pending Prescriptions Disp Refills    losartan (COZAAR) 50 MG tablet 30 tablet 5     Sig: Take 1 tablet by mouth daily     Its reported on the med list the patient is taking 100mg do you want this sent in for the 50mg or the 100mg  : Please advise

## 2020-03-16 ENCOUNTER — TELEPHONE (OUTPATIENT)
Dept: PRIMARY CARE CLINIC | Age: 60
End: 2020-03-16

## 2020-03-16 RX ORDER — PEN NEEDLE, DIABETIC 31 GX5/16"
NEEDLE, DISPOSABLE MISCELLANEOUS
Qty: 100 EACH | Refills: 3 | Status: SHIPPED | OUTPATIENT
Start: 2020-03-16 | End: 2021-05-27

## 2020-03-17 RX ORDER — LOSARTAN POTASSIUM 50 MG/1
50 TABLET ORAL 2 TIMES DAILY
Qty: 60 TABLET | Refills: 1 | Status: SHIPPED | OUTPATIENT
Start: 2020-03-17 | End: 2020-03-17

## 2020-03-17 RX ORDER — LOSARTAN POTASSIUM 50 MG/1
TABLET ORAL
Qty: 180 TABLET | Refills: 0 | Status: SHIPPED | OUTPATIENT
Start: 2020-03-17 | End: 2020-07-01

## 2020-03-17 NOTE — TELEPHONE ENCOUNTER
Theora Simpson called to request a refill on her medication.       Last office visit : 2/14/2020   Next office visit : 3/27/2020     Requested Prescriptions     Pending Prescriptions Disp Refills    losartan (COZAAR) 50 MG tablet 60 tablet 1     Sig: Take 1 tablet by mouth 2 times daily            Cecilia Vargas

## 2020-03-18 ENCOUNTER — APPOINTMENT (OUTPATIENT)
Dept: CT IMAGING | Facility: HOSPITAL | Age: 60
End: 2020-03-18

## 2020-03-18 ENCOUNTER — HOSPITAL ENCOUNTER (OUTPATIENT)
Facility: HOSPITAL | Age: 60
Setting detail: OBSERVATION
Discharge: HOME | End: 2020-03-20
Attending: EMERGENCY MEDICINE | Admitting: FAMILY MEDICINE

## 2020-03-18 DIAGNOSIS — R19.7 NAUSEA VOMITING AND DIARRHEA: Primary | ICD-10-CM

## 2020-03-18 DIAGNOSIS — R11.2 NAUSEA VOMITING AND DIARRHEA: Primary | ICD-10-CM

## 2020-03-18 LAB
ALBUMIN SERPL-MCNC: 4.2 G/DL (ref 3.5–5.2)
ALBUMIN/GLOB SERPL: 1.1 G/DL
ALP SERPL-CCNC: 323 U/L (ref 39–117)
ALT SERPL W P-5'-P-CCNC: 7 U/L (ref 1–33)
ANION GAP SERPL CALCULATED.3IONS-SCNC: 21 MMOL/L (ref 5–15)
AST SERPL-CCNC: 12 U/L (ref 1–32)
BACTERIA UR QL AUTO: ABNORMAL /HPF
BASOPHILS # BLD AUTO: 0.03 10*3/MM3 (ref 0–0.2)
BASOPHILS NFR BLD AUTO: 0.3 % (ref 0–1.5)
BILIRUB SERPL-MCNC: 0.3 MG/DL (ref 0.2–1.2)
BILIRUB UR QL STRIP: NEGATIVE
BUN BLD-MCNC: 38 MG/DL (ref 8–23)
BUN/CREAT SERPL: 4.6 (ref 7–25)
CALCIUM SPEC-SCNC: 9.8 MG/DL (ref 8.6–10.5)
CHLORIDE SERPL-SCNC: 87 MMOL/L (ref 98–107)
CLARITY UR: CLEAR
CO2 SERPL-SCNC: 28 MMOL/L (ref 22–29)
COLOR UR: YELLOW
CREAT BLD-MCNC: 8.28 MG/DL (ref 0.57–1)
D-LACTATE SERPL-SCNC: 1.3 MMOL/L (ref 0.5–2)
DEPRECATED RDW RBC AUTO: 53.2 FL (ref 37–54)
EOSINOPHIL # BLD AUTO: 0.36 10*3/MM3 (ref 0–0.4)
EOSINOPHIL NFR BLD AUTO: 4 % (ref 0.3–6.2)
ERYTHROCYTE [DISTWIDTH] IN BLOOD BY AUTOMATED COUNT: 15.7 % (ref 12.3–15.4)
GFR SERPL CREATININE-BSD FRML MDRD: 5 ML/MIN/1.73
GFR SERPL CREATININE-BSD FRML MDRD: ABNORMAL ML/MIN/{1.73_M2}
GLOBULIN UR ELPH-MCNC: 3.8 GM/DL
GLUCOSE BLD-MCNC: 135 MG/DL (ref 65–99)
GLUCOSE BLDC GLUCOMTR-MCNC: 154 MG/DL (ref 70–130)
GLUCOSE UR STRIP-MCNC: ABNORMAL MG/DL
HCT VFR BLD AUTO: 35.1 % (ref 34–46.6)
HGB BLD-MCNC: 10.9 G/DL (ref 12–15.9)
HGB UR QL STRIP.AUTO: ABNORMAL
HOLD SPECIMEN: NORMAL
HOLD SPECIMEN: NORMAL
HYALINE CASTS UR QL AUTO: ABNORMAL /LPF
IMM GRANULOCYTES # BLD AUTO: 0.03 10*3/MM3 (ref 0–0.05)
IMM GRANULOCYTES NFR BLD AUTO: 0.3 % (ref 0–0.5)
KETONES UR QL STRIP: NEGATIVE
LEUKOCYTE ESTERASE UR QL STRIP.AUTO: ABNORMAL
LIPASE SERPL-CCNC: 31 U/L (ref 13–60)
LYMPHOCYTES # BLD AUTO: 1.43 10*3/MM3 (ref 0.7–3.1)
LYMPHOCYTES NFR BLD AUTO: 15.9 % (ref 19.6–45.3)
MCH RBC QN AUTO: 28.7 PG (ref 26.6–33)
MCHC RBC AUTO-ENTMCNC: 31.1 G/DL (ref 31.5–35.7)
MCV RBC AUTO: 92.4 FL (ref 79–97)
MONOCYTES # BLD AUTO: 0.49 10*3/MM3 (ref 0.1–0.9)
MONOCYTES NFR BLD AUTO: 5.5 % (ref 5–12)
NEUTROPHILS # BLD AUTO: 6.64 10*3/MM3 (ref 1.7–7)
NEUTROPHILS NFR BLD AUTO: 74 % (ref 42.7–76)
NITRITE UR QL STRIP: NEGATIVE
NRBC BLD AUTO-RTO: 0 /100 WBC (ref 0–0.2)
PH UR STRIP.AUTO: 8.5 [PH] (ref 5–8)
PLATELET # BLD AUTO: 381 10*3/MM3 (ref 140–450)
PMV BLD AUTO: 9 FL (ref 6–12)
POTASSIUM BLD-SCNC: 5.2 MMOL/L (ref 3.5–5.2)
PROT SERPL-MCNC: 8 G/DL (ref 6–8.5)
PROT UR QL STRIP: ABNORMAL
RBC # BLD AUTO: 3.8 10*6/MM3 (ref 3.77–5.28)
RBC # UR: ABNORMAL /HPF
REF LAB TEST METHOD: ABNORMAL
SODIUM BLD-SCNC: 136 MMOL/L (ref 136–145)
SP GR UR STRIP: 1.01 (ref 1–1.03)
SQUAMOUS #/AREA URNS HPF: ABNORMAL /HPF
UROBILINOGEN UR QL STRIP: ABNORMAL
WBC NRBC COR # BLD: 8.98 10*3/MM3 (ref 3.4–10.8)
WBC UR QL AUTO: ABNORMAL /HPF
WHOLE BLOOD HOLD SPECIMEN: NORMAL
WHOLE BLOOD HOLD SPECIMEN: NORMAL

## 2020-03-18 PROCEDURE — G0378 HOSPITAL OBSERVATION PER HR: HCPCS

## 2020-03-18 PROCEDURE — 93010 ELECTROCARDIOGRAM REPORT: CPT | Performed by: INTERNAL MEDICINE

## 2020-03-18 PROCEDURE — 25010000002 ONDANSETRON PER 1 MG: Performed by: EMERGENCY MEDICINE

## 2020-03-18 PROCEDURE — 85025 COMPLETE CBC W/AUTO DIFF WBC: CPT | Performed by: EMERGENCY MEDICINE

## 2020-03-18 PROCEDURE — 63710000001 INSULIN LISPRO (HUMAN) PER 5 UNITS: Performed by: NURSE PRACTITIONER

## 2020-03-18 PROCEDURE — 83605 ASSAY OF LACTIC ACID: CPT | Performed by: EMERGENCY MEDICINE

## 2020-03-18 PROCEDURE — 74176 CT ABD & PELVIS W/O CONTRAST: CPT

## 2020-03-18 PROCEDURE — 83690 ASSAY OF LIPASE: CPT | Performed by: EMERGENCY MEDICINE

## 2020-03-18 PROCEDURE — 82962 GLUCOSE BLOOD TEST: CPT

## 2020-03-18 PROCEDURE — 99283 EMERGENCY DEPT VISIT LOW MDM: CPT

## 2020-03-18 PROCEDURE — 93005 ELECTROCARDIOGRAM TRACING: CPT | Performed by: EMERGENCY MEDICINE

## 2020-03-18 PROCEDURE — 94799 UNLISTED PULMONARY SVC/PX: CPT

## 2020-03-18 PROCEDURE — 96374 THER/PROPH/DIAG INJ IV PUSH: CPT

## 2020-03-18 PROCEDURE — 80053 COMPREHEN METABOLIC PANEL: CPT | Performed by: EMERGENCY MEDICINE

## 2020-03-18 PROCEDURE — 81001 URINALYSIS AUTO W/SCOPE: CPT | Performed by: EMERGENCY MEDICINE

## 2020-03-18 PROCEDURE — 36415 COLL VENOUS BLD VENIPUNCTURE: CPT

## 2020-03-18 RX ORDER — PRAVASTATIN SODIUM 20 MG
10 TABLET ORAL NIGHTLY
Status: DISCONTINUED | OUTPATIENT
Start: 2020-03-18 | End: 2020-03-20 | Stop reason: HOSPADM

## 2020-03-18 RX ORDER — CALCITRIOL 0.25 UG/1
0.25 CAPSULE, LIQUID FILLED ORAL TAKE AS DIRECTED
Status: DISCONTINUED | OUTPATIENT
Start: 2020-03-18 | End: 2020-03-19

## 2020-03-18 RX ORDER — ONDANSETRON 2 MG/ML
4 INJECTION INTRAMUSCULAR; INTRAVENOUS EVERY 6 HOURS PRN
Status: DISCONTINUED | OUTPATIENT
Start: 2020-03-18 | End: 2020-03-20 | Stop reason: HOSPADM

## 2020-03-18 RX ORDER — LEVOTHYROXINE SODIUM 0.03 MG/1
25 TABLET ORAL
Status: DISCONTINUED | OUTPATIENT
Start: 2020-03-19 | End: 2020-03-20 | Stop reason: HOSPADM

## 2020-03-18 RX ORDER — NICOTINE POLACRILEX 4 MG
15 LOZENGE BUCCAL
Status: DISCONTINUED | OUTPATIENT
Start: 2020-03-18 | End: 2020-03-20 | Stop reason: HOSPADM

## 2020-03-18 RX ORDER — SEVELAMER CARBONATE 800 MG/1
800 TABLET, FILM COATED ORAL
Status: DISCONTINUED | OUTPATIENT
Start: 2020-03-19 | End: 2020-03-20 | Stop reason: HOSPADM

## 2020-03-18 RX ORDER — TIMOLOL MALEATE 5 MG/ML
1 SOLUTION/ DROPS OPHTHALMIC DAILY
Status: DISCONTINUED | OUTPATIENT
Start: 2020-03-19 | End: 2020-03-20 | Stop reason: HOSPADM

## 2020-03-18 RX ORDER — FERROUS SULFATE 325(65) MG
325 TABLET ORAL
Status: DISCONTINUED | OUTPATIENT
Start: 2020-03-19 | End: 2020-03-20 | Stop reason: HOSPADM

## 2020-03-18 RX ORDER — SODIUM CHLORIDE 0.9 % (FLUSH) 0.9 %
10 SYRINGE (ML) INJECTION EVERY 12 HOURS SCHEDULED
Status: DISCONTINUED | OUTPATIENT
Start: 2020-03-18 | End: 2020-03-20 | Stop reason: HOSPADM

## 2020-03-18 RX ORDER — NICOTINE 21 MG/24HR
1 PATCH, TRANSDERMAL 24 HOURS TRANSDERMAL
Status: DISCONTINUED | OUTPATIENT
Start: 2020-03-18 | End: 2020-03-20 | Stop reason: HOSPADM

## 2020-03-18 RX ORDER — FLUTICASONE PROPIONATE 50 MCG
2 SPRAY, SUSPENSION (ML) NASAL 2 TIMES DAILY
Status: DISCONTINUED | OUTPATIENT
Start: 2020-03-18 | End: 2020-03-20 | Stop reason: HOSPADM

## 2020-03-18 RX ORDER — LOSARTAN POTASSIUM 25 MG/1
50 TABLET ORAL DAILY
Status: DISCONTINUED | OUTPATIENT
Start: 2020-03-19 | End: 2020-03-20 | Stop reason: HOSPADM

## 2020-03-18 RX ORDER — MELATONIN
2000 DAILY
Status: DISCONTINUED | OUTPATIENT
Start: 2020-03-19 | End: 2020-03-20 | Stop reason: HOSPADM

## 2020-03-18 RX ORDER — CARVEDILOL 6.25 MG/1
12.5 TABLET ORAL 2 TIMES DAILY WITH MEALS
Status: DISCONTINUED | OUTPATIENT
Start: 2020-03-18 | End: 2020-03-20 | Stop reason: HOSPADM

## 2020-03-18 RX ORDER — FAMOTIDINE 20 MG/1
20 TABLET, FILM COATED ORAL
Status: DISCONTINUED | OUTPATIENT
Start: 2020-03-19 | End: 2020-03-20 | Stop reason: HOSPADM

## 2020-03-18 RX ORDER — ONDANSETRON 2 MG/ML
4 INJECTION INTRAMUSCULAR; INTRAVENOUS ONCE
Status: COMPLETED | OUTPATIENT
Start: 2020-03-18 | End: 2020-03-18

## 2020-03-18 RX ORDER — BRIMONIDINE TARTRATE 2 MG/ML
1 SOLUTION/ DROPS OPHTHALMIC 3 TIMES DAILY
Status: DISCONTINUED | OUTPATIENT
Start: 2020-03-18 | End: 2020-03-20 | Stop reason: HOSPADM

## 2020-03-18 RX ORDER — ASPIRIN 81 MG/1
81 TABLET ORAL DAILY
Status: DISCONTINUED | OUTPATIENT
Start: 2020-03-19 | End: 2020-03-20 | Stop reason: HOSPADM

## 2020-03-18 RX ORDER — ONDANSETRON 4 MG/1
4 TABLET, FILM COATED ORAL EVERY 6 HOURS PRN
Status: DISCONTINUED | OUTPATIENT
Start: 2020-03-18 | End: 2020-03-20 | Stop reason: HOSPADM

## 2020-03-18 RX ORDER — ATROPINE SULFATE 10 MG/ML
1 SOLUTION/ DROPS OPHTHALMIC DAILY
Status: DISCONTINUED | OUTPATIENT
Start: 2020-03-19 | End: 2020-03-20 | Stop reason: HOSPADM

## 2020-03-18 RX ORDER — SODIUM CHLORIDE 0.9 % (FLUSH) 0.9 %
10 SYRINGE (ML) INJECTION AS NEEDED
Status: DISCONTINUED | OUTPATIENT
Start: 2020-03-18 | End: 2020-03-20 | Stop reason: HOSPADM

## 2020-03-18 RX ORDER — DEXTROSE MONOHYDRATE 25 G/50ML
25 INJECTION, SOLUTION INTRAVENOUS
Status: DISCONTINUED | OUTPATIENT
Start: 2020-03-18 | End: 2020-03-20 | Stop reason: HOSPADM

## 2020-03-18 RX ORDER — DORZOLAMIDE HCL 20 MG/ML
1 SOLUTION/ DROPS OPHTHALMIC 2 TIMES DAILY
Status: DISCONTINUED | OUTPATIENT
Start: 2020-03-18 | End: 2020-03-20 | Stop reason: HOSPADM

## 2020-03-18 RX ORDER — ALBUTEROL SULFATE 2.5 MG/3ML
2.5 SOLUTION RESPIRATORY (INHALATION) EVERY 6 HOURS PRN
Status: DISCONTINUED | OUTPATIENT
Start: 2020-03-18 | End: 2020-03-20 | Stop reason: HOSPADM

## 2020-03-18 RX ADMIN — BRIMONIDINE TARTRATE 1 DROP: 2 SOLUTION OPHTHALMIC at 23:14

## 2020-03-18 RX ADMIN — ONDANSETRON HYDROCHLORIDE 4 MG: 2 SOLUTION INTRAMUSCULAR; INTRAVENOUS at 17:58

## 2020-03-18 RX ADMIN — INSULIN LISPRO 2 UNITS: 100 INJECTION, SOLUTION INTRAVENOUS; SUBCUTANEOUS at 23:13

## 2020-03-18 RX ADMIN — PRAVASTATIN SODIUM 10 MG: 20 TABLET ORAL at 23:13

## 2020-03-18 RX ADMIN — CARVEDILOL 12.5 MG: 6.25 TABLET, FILM COATED ORAL at 23:15

## 2020-03-18 RX ADMIN — NICOTINE 1 PATCH: 21 PATCH, EXTENDED RELEASE TRANSDERMAL at 22:03

## 2020-03-18 RX ADMIN — SODIUM CHLORIDE 500 ML: 9 INJECTION, SOLUTION INTRAVENOUS at 19:08

## 2020-03-18 RX ADMIN — FLUTICASONE PROPIONATE 2 SPRAY: 50 SPRAY, METERED NASAL at 23:13

## 2020-03-18 NOTE — ED PROVIDER NOTES
Subjective   This is a 59-year-old lady with a past medical history of [place], hypertension, ESRD on Tuesday, Thursday, Saturday dialysis with her last dialysis being on Saturday who presents the emergency department with nausea, vomiting, diarrhea.  This was gradual in onset 4 days ago.  Constant.  Moderate.  No exacerbating relieving factors.  No associated symptoms.  She denies any headache or vision changes.  She has no chest pain or shortness of breath.  She has no cough, fevers, chills, or abdominal pain.  She states her last vomit was this morning and it was nonbloody nonbilious.  She has had no blood in her diarrhea.  She estimates she is having 4-5 episodes of diarrhea per day.    Past medical history: End-stage renal disease  Social history: Smokes tobacco      History provided by:  Patient      Review of Systems   All other systems reviewed and are negative.      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    • Chronic otitis media    • Diabetes (CMS/HCC)    • Eustachian tube dysfunction    • Glaucoma    • HTN (hypertension)    • Kidney failure     on dialysis   • Liver disorder    • Mucoid otitis media        Allergies   Allergen Reactions   • Bupropion Nausea Only   • Chantix [Varenicline]    • Sulfa Antibiotics    • Azithromycin Rash   • Levofloxacin Rash   • Penicillins Rash       Past Surgical History:   Procedure Laterality Date   • ARTERIOVENOUS FISTULA     • CATARACT EXTRACTION WITH INTRAOCULAR LENS IMPLANT     •  SECTION     • CHOLECYSTECTOMY     • MYRINGOTOMY W/ TUBES Bilateral    • MYRINGOTOMY W/ TUBES Right    • TUBAL ABDOMINAL LIGATION         Family History   Problem Relation Age of Onset   • Heart disease Mother        Social History     Socioeconomic History   • Marital status:      Spouse name: Not on file   • Number of children: Not on file   • Years of education: Not on file   • Highest education level: Not on file    Tobacco Use   • Smoking status: Current Every Day Smoker     Packs/day: 1.00     Years: 6.00     Pack years: 6.00     Types: Cigarettes   • Smokeless tobacco: Never Used   Substance and Sexual Activity   • Alcohol use: No   • Drug use: No   • Sexual activity: Defer           Objective   Physical Exam   Constitutional: She appears well-developed and well-nourished. No distress.   HENT:   Head: Normocephalic and atraumatic.   Eyes: Conjunctivae and EOM are normal. Right eye exhibits no discharge. Left eye exhibits no discharge.   Neck: Normal range of motion. Neck supple. No JVD present. No tracheal deviation present.   Cardiovascular: Normal rate, regular rhythm, normal heart sounds and intact distal pulses. Exam reveals no gallop and no friction rub.   No murmur heard.  Pulmonary/Chest: Effort normal and breath sounds normal. No stridor. No respiratory distress. She has no wheezes. She has no rales. She exhibits no tenderness.   Abdominal: Soft. Bowel sounds are normal. She exhibits no distension and no mass. There is no tenderness. There is no rebound and no guarding. No hernia.   Musculoskeletal: Normal range of motion. She exhibits no edema or deformity.   Neurological: She is alert.   Skin: Skin is warm and dry. Capillary refill takes less than 2 seconds. No rash noted. She is not diaphoretic. No pallor.   Psychiatric: She has a normal mood and affect. Her behavior is normal.   Nursing note and vitals reviewed.      Procedures           ED Course                                           MDM  Number of Diagnoses or Management Options  Nausea vomiting and diarrhea: new and requires workup  Diagnosis management comments: Patient presents with nausea, vomiting, diarrhea.  Upon arrival she is in no acute distress and vital signs are reassuring.  She is slightly hypertensive.  IV access is obtained and a rainbow of labs are sent. Labs show lactic acid of 1.3, urinalysis with no signs of infection, CMP with a  glucose of 135, creatinine and BUN of 8.28 and 38 respectively consistent with her history of end-stage renal disease, she does have a large anion gap at 21 I believe this is likely due to her chloride of 87 which is consistent with her history of vomiting.  She denies taking any other medications.  She does not appear to be in a starvation ketosis with no ketones in her urine.  She is not an alcohol drinker.  She is not uremic.  She does not take isoniazid or Tylenol.  She does not have a lactic acidosis.  She denies ethylene glycol ingestion and does not show any symptoms of this.  She does not take aspirin.  I have a long discussion with the patient.  Her CT Noncon shows no acute findings.  She is uncomfortable going home but does not believe she will be able to go to dialysis tomorrow.  She attempted to tolerate p.o. while in the emergency department and was unable to do so.  Due to this I discussed the case with the hospitalist.  She was admitted in stable condition for further evaluation and management.       Amount and/or Complexity of Data Reviewed  Clinical lab tests: ordered and reviewed  Tests in the radiology section of CPT®: ordered and reviewed  Decide to obtain previous medical records or to obtain history from someone other than the patient: yes    Risk of Complications, Morbidity, and/or Mortality  Presenting problems: moderate  Diagnostic procedures: low  Management options: moderate    Patient Progress  Patient progress: stable      Final diagnoses:   Nausea vomiting and diarrhea            Eric Avitia MD  03/18/20 9281

## 2020-03-19 LAB
ADV 40+41 DNA STL QL NAA+NON-PROBE: NOT DETECTED
ANION GAP SERPL CALCULATED.3IONS-SCNC: 17 MMOL/L (ref 5–15)
ASTRO TYP 1-8 RNA STL QL NAA+NON-PROBE: NOT DETECTED
BUN BLD-MCNC: 40 MG/DL (ref 8–23)
BUN/CREAT SERPL: 4.3 (ref 7–25)
C CAYETANENSIS DNA STL QL NAA+NON-PROBE: NOT DETECTED
CALCIUM SPEC-SCNC: 9 MG/DL (ref 8.6–10.5)
CAMPY SP DNA.DIARRHEA STL QL NAA+PROBE: NOT DETECTED
CHLORIDE SERPL-SCNC: 92 MMOL/L (ref 98–107)
CHOLEST SERPL-MCNC: 85 MG/DL (ref 0–200)
CO2 SERPL-SCNC: 28 MMOL/L (ref 22–29)
CREAT BLD-MCNC: 9.26 MG/DL (ref 0.57–1)
CRYPTOSP STL CULT: NOT DETECTED
DEPRECATED RDW RBC AUTO: 53.6 FL (ref 37–54)
E COLI DNA SPEC QL NAA+PROBE: NOT DETECTED
E HISTOLYT AG STL-ACNC: NOT DETECTED
EAEC PAA PLAS AGGR+AATA ST NAA+NON-PRB: NOT DETECTED
EC STX1 + STX2 GENES STL NAA+PROBE: NOT DETECTED
EPEC EAE GENE STL QL NAA+NON-PROBE: NOT DETECTED
ERYTHROCYTE [DISTWIDTH] IN BLOOD BY AUTOMATED COUNT: 15.5 % (ref 12.3–15.4)
ETEC LTA+ST1A+ST1B TOX ST NAA+NON-PROBE: NOT DETECTED
G LAMBLIA DNA SPEC QL NAA+PROBE: NOT DETECTED
GFR SERPL CREATININE-BSD FRML MDRD: 4 ML/MIN/1.73
GFR SERPL CREATININE-BSD FRML MDRD: ABNORMAL ML/MIN/{1.73_M2}
GLUCOSE BLD-MCNC: 176 MG/DL (ref 65–99)
GLUCOSE BLDC GLUCOMTR-MCNC: 132 MG/DL (ref 70–130)
GLUCOSE BLDC GLUCOMTR-MCNC: 205 MG/DL (ref 70–130)
GLUCOSE BLDC GLUCOMTR-MCNC: 246 MG/DL (ref 70–130)
GLUCOSE BLDC GLUCOMTR-MCNC: 52 MG/DL (ref 70–130)
GLUCOSE BLDC GLUCOMTR-MCNC: 69 MG/DL (ref 70–130)
GLUCOSE BLDC GLUCOMTR-MCNC: 91 MG/DL (ref 70–130)
GLUCOSE BLDC GLUCOMTR-MCNC: 96 MG/DL (ref 70–130)
HBA1C MFR BLD: 6.8 % (ref 4.8–5.6)
HBV SURFACE AG SERPL QL IA: NORMAL
HCT VFR BLD AUTO: 31.6 % (ref 34–46.6)
HDLC SERPL-MCNC: 39 MG/DL (ref 40–60)
HGB BLD-MCNC: 9.8 G/DL (ref 12–15.9)
HOLD SPECIMEN: NORMAL
LDLC SERPL CALC-MCNC: 20 MG/DL (ref 0–100)
LDLC/HDLC SERPL: 0.52 {RATIO}
MCH RBC QN AUTO: 29.2 PG (ref 26.6–33)
MCHC RBC AUTO-ENTMCNC: 31 G/DL (ref 31.5–35.7)
MCV RBC AUTO: 94 FL (ref 79–97)
NOROVIRUS GI+II RNA STL QL NAA+NON-PROBE: NOT DETECTED
P SHIGELLOIDES DNA STL QL NAA+PROBE: NOT DETECTED
PLATELET # BLD AUTO: 305 10*3/MM3 (ref 140–450)
PMV BLD AUTO: 9 FL (ref 6–12)
POTASSIUM BLD-SCNC: 4.4 MMOL/L (ref 3.5–5.2)
RBC # BLD AUTO: 3.36 10*6/MM3 (ref 3.77–5.28)
RV RNA STL NAA+PROBE: NOT DETECTED
SALMONELLA DNA SPEC QL NAA+PROBE: NOT DETECTED
SAPO I+II+IV+V RNA STL QL NAA+NON-PROBE: NOT DETECTED
SHIGELLA SP+EIEC IPAH STL QL NAA+PROBE: NOT DETECTED
SODIUM BLD-SCNC: 137 MMOL/L (ref 136–145)
TRIGL SERPL-MCNC: 128 MG/DL (ref 0–150)
TSH SERPL DL<=0.05 MIU/L-ACNC: 3.14 UIU/ML (ref 0.27–4.2)
V CHOLERAE DNA SPEC QL NAA+PROBE: NOT DETECTED
VIBRIO DNA SPEC NAA+PROBE: NOT DETECTED
VLDLC SERPL-MCNC: 25.6 MG/DL
WBC NRBC COR # BLD: 6.44 10*3/MM3 (ref 3.4–10.8)
YERSINIA STL CULT: NOT DETECTED

## 2020-03-19 PROCEDURE — 82962 GLUCOSE BLOOD TEST: CPT

## 2020-03-19 PROCEDURE — 80048 BASIC METABOLIC PNL TOTAL CA: CPT | Performed by: NURSE PRACTITIONER

## 2020-03-19 PROCEDURE — 85027 COMPLETE CBC AUTOMATED: CPT | Performed by: NURSE PRACTITIONER

## 2020-03-19 PROCEDURE — 83036 HEMOGLOBIN GLYCOSYLATED A1C: CPT | Performed by: NURSE PRACTITIONER

## 2020-03-19 PROCEDURE — G0378 HOSPITAL OBSERVATION PER HR: HCPCS

## 2020-03-19 PROCEDURE — 87340 HEPATITIS B SURFACE AG IA: CPT | Performed by: INTERNAL MEDICINE

## 2020-03-19 PROCEDURE — 94799 UNLISTED PULMONARY SVC/PX: CPT

## 2020-03-19 PROCEDURE — 94664 DEMO&/EVAL PT USE INHALER: CPT

## 2020-03-19 PROCEDURE — 0097U HC BIOFIRE FILMARRAY GI PANEL: CPT | Performed by: NURSE PRACTITIONER

## 2020-03-19 PROCEDURE — 99406 BEHAV CHNG SMOKING 3-10 MIN: CPT

## 2020-03-19 PROCEDURE — 80061 LIPID PANEL: CPT | Performed by: NURSE PRACTITIONER

## 2020-03-19 PROCEDURE — 63710000001 INSULIN LISPRO (HUMAN) PER 5 UNITS: Performed by: NURSE PRACTITIONER

## 2020-03-19 PROCEDURE — 84443 ASSAY THYROID STIM HORMONE: CPT | Performed by: NURSE PRACTITIONER

## 2020-03-19 PROCEDURE — G0257 UNSCHED DIALYSIS ESRD PT HOS: HCPCS

## 2020-03-19 RX ORDER — ALBUMIN (HUMAN) 12.5 G/50ML
12.5 SOLUTION INTRAVENOUS AS NEEDED
Status: CANCELLED | OUTPATIENT
Start: 2020-03-19 | End: 2020-03-20

## 2020-03-19 RX ORDER — BISMUTH SUBSALICYLATE 262 MG/1
262 TABLET, CHEWABLE ORAL DAILY PRN
COMMUNITY
End: 2020-05-02 | Stop reason: HOSPADM

## 2020-03-19 RX ORDER — SACCHAROMYCES BOULARDII 250 MG
250 CAPSULE ORAL 2 TIMES DAILY
Status: DISCONTINUED | OUTPATIENT
Start: 2020-03-19 | End: 2020-03-20 | Stop reason: HOSPADM

## 2020-03-19 RX ADMIN — BRIMONIDINE TARTRATE 1 DROP: 2 SOLUTION OPHTHALMIC at 17:08

## 2020-03-19 RX ADMIN — INSULIN LISPRO 3 UNITS: 100 INJECTION, SOLUTION INTRAVENOUS; SUBCUTANEOUS at 08:27

## 2020-03-19 RX ADMIN — LOSARTAN POTASSIUM 50 MG: 25 TABLET, FILM COATED ORAL at 08:28

## 2020-03-19 RX ADMIN — NETARSUDIL 1 DROP: 0.2 SOLUTION/ DROPS OPHTHALMIC; TOPICAL at 01:25

## 2020-03-19 RX ADMIN — VITAMIN D 2000 UNITS: 25 TAB ORAL at 08:28

## 2020-03-19 RX ADMIN — SEVELAMER CARBONATE 800 MG: 800 TABLET, FILM COATED ORAL at 08:28

## 2020-03-19 RX ADMIN — DORZOLAMIDE HYDROCHLORIDE 1 DROP: 20 SOLUTION/ DROPS OPHTHALMIC at 20:51

## 2020-03-19 RX ADMIN — ASPIRIN 81 MG: 81 TABLET ORAL at 08:28

## 2020-03-19 RX ADMIN — FAMOTIDINE 20 MG: 20 TABLET, FILM COATED ORAL at 12:51

## 2020-03-19 RX ADMIN — SODIUM CHLORIDE, PRESERVATIVE FREE 10 ML: 5 INJECTION INTRAVENOUS at 01:24

## 2020-03-19 RX ADMIN — CARVEDILOL 12.5 MG: 6.25 TABLET, FILM COATED ORAL at 08:28

## 2020-03-19 RX ADMIN — NETARSUDIL 1 DROP: 0.2 SOLUTION/ DROPS OPHTHALMIC; TOPICAL at 21:05

## 2020-03-19 RX ADMIN — ATROPINE SULFATE 1 DROP: 10 SOLUTION/ DROPS OPHTHALMIC at 08:29

## 2020-03-19 RX ADMIN — FERROUS SULFATE TAB 325 MG (65 MG ELEMENTAL FE) 325 MG: 325 (65 FE) TAB at 08:28

## 2020-03-19 RX ADMIN — FLUTICASONE PROPIONATE 2 SPRAY: 50 SPRAY, METERED NASAL at 20:49

## 2020-03-19 RX ADMIN — PRAVASTATIN SODIUM 10 MG: 20 TABLET ORAL at 20:48

## 2020-03-19 RX ADMIN — SODIUM CHLORIDE, PRESERVATIVE FREE 10 ML: 5 INJECTION INTRAVENOUS at 08:30

## 2020-03-19 RX ADMIN — BRIMONIDINE TARTRATE 1 DROP: 2 SOLUTION OPHTHALMIC at 08:28

## 2020-03-19 RX ADMIN — SEVELAMER CARBONATE 800 MG: 800 TABLET, FILM COATED ORAL at 12:51

## 2020-03-19 RX ADMIN — LATANOPROSTENE BUNOD: 0.24 SOLUTION/ DROPS OPHTHALMIC at 01:24

## 2020-03-19 RX ADMIN — SEVELAMER CARBONATE 800 MG: 800 TABLET, FILM COATED ORAL at 17:08

## 2020-03-19 RX ADMIN — LATANOPROSTENE BUNOD 1 DROP: 0.24 SOLUTION/ DROPS OPHTHALMIC at 20:49

## 2020-03-19 RX ADMIN — CARVEDILOL 12.5 MG: 6.25 TABLET, FILM COATED ORAL at 17:08

## 2020-03-19 RX ADMIN — SERTRALINE 25 MG: 50 TABLET, FILM COATED ORAL at 08:28

## 2020-03-19 RX ADMIN — BRIMONIDINE TARTRATE 1 DROP: 2 SOLUTION OPHTHALMIC at 20:48

## 2020-03-19 RX ADMIN — LEVOTHYROXINE SODIUM 25 MCG: 25 TABLET ORAL at 05:21

## 2020-03-19 RX ADMIN — TIMOLOL MALEATE 1 DROP: 5 SOLUTION/ DROPS OPHTHALMIC at 08:30

## 2020-03-19 RX ADMIN — INSULIN LISPRO 3 UNITS: 100 INJECTION, SOLUTION INTRAVENOUS; SUBCUTANEOUS at 17:08

## 2020-03-19 RX ADMIN — Medication 250 MG: at 20:49

## 2020-03-19 RX ADMIN — NICOTINE 1 PATCH: 21 PATCH, EXTENDED RELEASE TRANSDERMAL at 20:46

## 2020-03-19 NOTE — PLAN OF CARE
"  Problem: Patient Care Overview  Goal: Plan of Care Review  Outcome: Ongoing (interventions implemented as appropriate)  Flowsheets  Taken 3/18/2020 2343  Progress: no change  Taken 3/18/2020 2100  Plan of Care Reviewed With: patient  Note:   Pt admitted from ER for N/V/D. No N/V/D on admission to floor. No c/o pain. VSS. Left UA fistula CDI, bruit and thrill present. Nephro consult today. Clear liquid diet maintained. Pt woke up around 0200 with a BG of 52; pt drenched in sweat and reported feeling \"sick.\" Provided with apple juice x2 and taras crackers. BG increased to 69 at re-check. Provided with one more apple juice then 91 after checking again. Pt reported resolution of symptoms.      "

## 2020-03-19 NOTE — ED NOTES
Request send to pharmacy for nicotine patch with request for it to be sent to Estephania Mercedes RN  03/18/20 2030

## 2020-03-19 NOTE — PLAN OF CARE
Problem: Patient Care Overview  Goal: Plan of Care Review  Outcome: Ongoing (interventions implemented as appropriate)  Flowsheets (Taken 3/19/2020 1172)  Progress: improving  Plan of Care Reviewed With: patient  Outcome Summary: VSS. NO C/O PAIN THIS SHIFT. NSR 67-82 ON TELE. A FEW BM'S TODAY, VERY LOOSE. GI SAMPLE SENT, WAITING FOR RESULTS. PATIENT DENIES ANY N/V. TOLERATING ADVANCED DIET WELL. DIALYSIS TODAY, 1L PULLED OFF. FLORASTOR ADDED. REPEAT LABS IN AM. IV LEAKING, D/C. OK TO LEAVE OUT PER ALEXY PANCHAL, ANTHONY. CONTINUE TO MONITOR.

## 2020-03-19 NOTE — PROGRESS NOTES
[facility] Medicine Services  INPATIENT PROGRESS NOTE    Length of Stay: 0  Date of Admission: 3/18/2020  Primary Care Physician: Bharati Dahl APRN    Subjective   Chief Complaint: Follow-up diarrhea  HPI   Patient sitting up in bed eating lunch.  She tells me she is feeling better in comparison to admission.  She has had 2 stools today, still describes this as liquid in consistency.  She denies any nausea or vomiting and is tolerating full liquids well.  She denies shortness of breath or chest pain.  She denies abdominal pain, does complain of some mild bloating.    Review of Systems   All pertinent negatives and positives are as above. All other systems have been reviewed and are negative unless otherwise stated.     Objective    Temp:  [97.6 °F (36.4 °C)-98.7 °F (37.1 °C)] 97.6 °F (36.4 °C)  Heart Rate:  [70-86] 75  Resp:  [18] 18  BP: (116-188)/(54-73) 152/67  Physical Exam   Constitutional: She is oriented to person, place, and time. She appears well-developed and well-nourished. No distress.   Chronically ill-appearing   HENT:   Head: Normocephalic and atraumatic.   Neck: Normal range of motion. Neck supple. No JVD present. No tracheal deviation present.   Cardiovascular: Normal rate, regular rhythm and intact distal pulses. Exam reveals no gallop and no friction rub.   Murmur heard.  Sinus 64-86   Pulmonary/Chest: Effort normal and breath sounds normal. She has no wheezes. She has no rales.   Abdominal: Soft. Bowel sounds are normal. She exhibits distension. There is no tenderness. There is no guarding.   Musculoskeletal: Normal range of motion. She exhibits no edema or tenderness.   Neurological: She is alert and oriented to person, place, and time. No cranial nerve deficit.   Skin: Skin is warm and dry. No rash noted. No erythema.   Psychiatric: She has a normal mood and affect. Her behavior is normal. Judgment and thought content normal.   Vitals reviewed.    Results  Review:  I have reviewed the labs, radiology results, and diagnostic studies.    Laboratory Data:   Results from last 7 days   Lab Units 03/19/20  0507 03/18/20  1633   WBC 10*3/mm3 6.44 8.98   HEMOGLOBIN g/dL 9.8* 10.9*   HEMATOCRIT % 31.6* 35.1   PLATELETS 10*3/mm3 305 381     Results from last 7 days   Lab Units 03/19/20  0507 03/18/20  1633   SODIUM mmol/L 137 136   POTASSIUM mmol/L 4.4 5.2   CHLORIDE mmol/L 92* 87*   CO2 mmol/L 28.0 28.0   BUN mg/dL 40* 38*   CREATININE mg/dL 9.26* 8.28*   CALCIUM mg/dL 9.0 9.8   BILIRUBIN mg/dL  --  0.3   ALK PHOS U/L  --  323*   ALT (SGPT) U/L  --  7   AST (SGOT) U/L  --  12   GLUCOSE mg/dL 176* 135*     Radiology Data:   Imaging Results (Last 24 Hours)     Procedure Component Value Units Date/Time    CT Abdomen Pelvis Without Contrast [[id number]] Collected:  03/18/20 1752     Updated:  03/18/20 1759    Narrative:       EXAMINATION:   CT ABDOMEN PELVIS WO CONTRAST-  3/18/2020 5:52 PM CDT     HISTORY: CT ABDOMEN PELVIS WO CONTRAST- 3/18/2020 5:41 PM CDT     HISTORY: Abdominal pain, unspecified; R11.2-Nausea with vomiting,  unspecified; R19.7-Diarrhea, unspecified      COMPARISON: None      DOSE LENGTH PRODUCT: 285 mGy cm. Automated exposure control was also  utilized to decrease patient radiation dose.     TECHNIQUE: Noncontrast enhanced images of the abdomen and pelvis  obtained without oral contrast. Multiplanar reformatted images were  provided for review.      FINDINGS:   Right posterior pleural effusion is present. Smaller left pleural  effusion is noted. Trace pericardial effusion is present..      LIVER: No focal liver lesion.      BILIARY SYSTEM: The gallbladder is surgically absent.      PANCREAS: No focal pancreatic lesion.      SPLEEN: Unremarkable.      KIDNEYS AND ADRENALS: Bilateral kidneys and adrenal glands are  unremarkable.  Vascular calcifications are noted in the renal arteries..     RETROPERITONEUM: No mass, lymphadenopathy or hemorrhage.      GI TRACT: No  evidence of obstruction or bowel wall thickening. The  appendix is visualized. Diverticulosis is present..     OTHER: There is no mesenteric mass, lymphadenopathy or fluid collection.  The osseous structures and soft tissues demonstrate no worrisome  lesions. Extensive vascular calcifications present abdominal aorta and  iliac arteries.      PELVIS: No mass lesion, fluid collection or significant lymphadenopathy  is seen in the pelvis. The urinary bladder is normal in appearance.       Impression:       1. Diverticulosis.  2. Extensive atherosclerotic vascular calcification and plaque is noted  in the abdominal aorta and iliac arteries.  3. Moderate right posterior pleural effusion, smaller left pleural  effusion and small pericardial effusion.         This report was finalized on 03/18/2020 17:56 by Dr. Tony Malik MD.        I have reviewed the patient current medications.     Assessment/Plan     Active Hospital Problems    Diagnosis   • Nausea vomiting and diarrhea   • Non-compliance with renal dialysis (CMS/[facility])   • Type 2 diabetes mellitus, with long-term current use of insulin (CMS/[facility])   • End stage renal failure on dialysis (CMS/[facility])   • Glaucoma     Plan:  1.  Diarrhea still present, but does seem to be improving.  Await results of GI panel.  Would have a low suspicion for C. Difficile-no recent antimicrobial therapy, no abdominal pain, no elevation of white blood cell count/fever, would hold off on testing at this time.  Will start probiotic.  2.  CT of the abdomen and pelvis shows diverticulosis without diverticulitis.  This does also show a small pericardial effusion.  This was not noted on recent echocardiogram from January, could consider repeat echocardiogram if needed, patient does not appear to be symptomatic.  3.  Advance to GI soft/bland diet  4.  Appreciate nephrology assistance.  Patient received dialysis treatment today without event.  5.  Labs in AM    Discharge Planning: I expect the  patient to be discharged to home in 1-2 days.    ANTHONY George   03/19/20   13:00    I personally evaluated and examined the patient in conjunction with ANTHONY Siegel and agree with the assessment, treatment plan, and disposition of the patient as recorded by her. My history, exam, and further recommendations are: I have reviewed and agree with the plans. Flory Lockett MD  03/19/20  18:48

## 2020-03-19 NOTE — CONSULTS
Nephrology ([facility]) Consult Note      Patient:  Mini Gentile  YOB: 1960  Date of Service: 3/19/2020  MRN: [id number]   Acct: [id number]   Primary Care Physician: Bharati Dahl APRN  Advance Directive:   Code Status and Medical Interventions:   Ordered at: 03/18/20 2016     Level Of Support Discussed With:    Patient     Code Status:    CPR     Medical Interventions (Level of Support Prior to Arrest):    Full     Admit Date: 3/18/2020       Hospital Day: 0  Referring Provider: Tejinder Knet MD      Patient personally seen and examined.  Complete chart including Consults, Notes, Operative Reports, Labs, Cardiology, and Radiology studies reviewed as able.        Subjective:  Mini Gentile is a 59 y.o. female  whom we were consulted for end stage renal disease.  Maintenance hemodialysis Tuesday Thursday Saturday at [facility]. Last dialysis was on 3/14; did not go on 3/17 due to illness. History of type 2 diabetes, hypertension, ongoing tobacco abuse.  Presented to emergency department with several days of nausea/vomiting and diarrhea.  Currently is seen during dialysis. No nausea at time of exam. Denies chest pain or dyspnea.    Dialysis   Pt was seen on RRT; tolerating well  Modality: Hemodialysis  Access: Arterial Venous Fistula  Location: left upper  QB: 450  QD: 600  UF: 2000    Allergies:  Bupropion; Chantix [varenicline]; Sulfa antibiotics; Azithromycin; Levofloxacin; and Penicillins    Home Meds:  Medications Prior to Admission   Medication Sig Dispense Refill Last Dose   • albuterol sulfate  (90 Base) MCG/ACT inhaler Inhale 2 puffs Every 4 (Four) Hours As Needed for Wheezing. 18 g 0    • aspirin 81 MG EC tablet Take 81 mg by mouth Daily.   12/5/2019 at Unknown time   • brimonidine (ALPHAGAN) 0.2 % ophthalmic solution Administer 1 drop into the left eye 3 (Three) Times a Day.   12/5/2019 at Unknown time   • calcitriol (ROCALTROL)  0.25 MCG capsule Take 0.25 mcg by mouth Take As Directed. Only given on dialysis days (Tuesday, Thursday, Saturday)   12/5/2019 at Unknown time   • carvedilol (COREG) 12.5 MG tablet Take 1 tablet by mouth 2 (Two) Times a Day With Meals. 60 tablet 0    • cholecalciferol (VITAMIN D3) 1000 units tablet Take 2,000 Units by mouth Daily.   12/5/2019 at Unknown time   • dorzolamide (TRUSOPT) 2 % ophthalmic solution Administer 1 drop into the left eye 2 (Two) Times a Day.   12/5/2019 at Unknown time   • famotidine (PEPCID) 20 MG tablet Take 20 mg by mouth 2 (Two) Times a Day.   12/5/2019 at Unknown time   • fluticasone (FLONASE) 50 MCG/ACT nasal spray 2 sprays into the nostril(s) as directed by provider 2 (Two) Times a Day.   12/5/2019 at Unknown time   • homatropine 5 % ophthalmic solution Administer 1 drop into the left eye Daily.   12/5/2019 at Unknown time   • insulin glargine (LANTUS) 100 UNIT/ML injection Inject 10 Units under the skin Every Night.   12/5/2019 at Unknown time   • Latanoprostene Bunod (Vyzulta) 0.024 % solution Apply 1 drop to eye(s) as directed by provider Every Night. Left eye      • levothyroxine (SYNTHROID, LEVOTHROID) 25 MCG tablet Take 1 tablet by mouth Daily. 30 tablet 2    • losartan (COZAAR) 50 MG tablet Take 50 mg by mouth Daily.      • Netarsudil Dimesylate (RHOPRESSA) 0.02 % solution Administer 1 drop into the left eye Every Night.   12/5/2019 at Unknown time   • pravastatin (PRAVACHOL) 10 MG tablet Take 10 mg by mouth Every Night.   12/5/2019 at Unknown time   • sertraline (ZOLOFT) 25 MG tablet Take 25 mg by mouth Daily.      • sevelamer (RENAGEL) 800 MG tablet Take 2,400 mg by mouth 3 (Three) Times a Day With Meals.   12/5/2019 at Unknown time   • timolol (TIMOPTIC) 0.5 % ophthalmic solution Administer 1 drop into the left eye Every Morning.   12/5/2019 at Unknown time       Medicines:  Current Facility-Administered Medications   Medication Dose Route Frequency Provider Last Rate Last  Dose   • albuterol (PROVENTIL) nebulizer solution 0.083% 2.5 mg/3mL  2.5 mg Nebulization Q6H PRN Catina Villegas APRN       • aspirin EC tablet 81 mg  81 mg Oral Daily Catina Villegas APRN   81 mg at 03/19/20 0828   • atropine 1 % ophthalmic solution 1 drop  1 drop Left Eye Daily Catina Villegas APRN   1 drop at 03/19/20 0829   • brimonidine (ALPHAGAN) 0.2 % ophthalmic solution 1 drop  1 drop Left Eye TID Catina Villegas APRN   1 drop at 03/19/20 0828   • calcitriol (ROCALTROL) capsule 0.25 mcg  0.25 mcg Oral Take As Directed Catina Villegas APRN       • carvedilol (COREG) tablet 12.5 mg  12.5 mg Oral BID With Meals Catina Villegas APRN   12.5 mg at 03/19/20 0828   • cholecalciferol (VITAMIN D3) tablet 2,000 Units  2,000 Units Oral Daily Catina Villegas APRN   2,000 Units at 03/19/20 0828   • dextrose (D50W) 25 g/ 50mL Intravenous Solution 25 g  25 g Intravenous Q15 Min PRN Catina Villegas APRN       • dextrose (GLUTOSE) oral gel 15 g  15 g Oral Q15 Min PRN Catina Villegas, APRAC       • dorzolamide (TRUSOPT) 2 % ophthalmic solution 1 drop  1 drop Left Eye BID Catina Villegas APRN   Stopped at 03/19/20 0131   • famotidine (PEPCID) tablet 20 mg  20 mg Oral Once per day on Tue Thu Sat Catina Villegas APRN       • ferrous sulfate tablet 325 mg  325 mg Oral Daily With Breakfast Catina Villegas APRN   325 mg at 03/19/20 0828   • fluticasone (FLONASE) 50 MCG/ACT nasal spray 2 spray  2 spray Nasal BID Catina Villegas APRN   2 spray at 03/18/20 2313   • glucagon (human recombinant) (GLUCAGEN DIAGNOSTIC) injection 1 mg  1 mg Subcutaneous Q15 Min PRN Catina Villegas APRN       • insulin lispro (humaLOG) injection 2-7 Units  2-7 Units Subcutaneous 4x Daily With Meals & Nightly Catina Villegas, APRN   3 Units at 03/19/20 0827   • Latanoprostene Bunod 0.024 % solution 1 drop  1 drop Left Eye Nightly Catina Villegas, APRN       • levothyroxine (SYNTHROID, LEVOTHROID) tablet 25 mcg  25  mcg Oral Q AM Catina Villegas APRN   25 mcg at 03/19/20 0521   • losartan (COZAAR) tablet 50 mg  50 mg Oral Daily Catina Villegas APRN   50 mg at 03/19/20 0828   • Netarsudil Dimesylate 0.02 % solution 1 drop  1 drop Left Eye Nightly Catina Villegas, APRN   1 drop at 03/19/20 0125   • nicotine (NICODERM CQ) 21 MG/24HR patch 1 patch  1 patch Transdermal Q24H Catina Villegas, APRN   1 patch at 03/18/20 2203   • ondansetron (ZOFRAN) tablet 4 mg  4 mg Oral Q6H PRN Catina Villegas, APRN        Or   • ondansetron (ZOFRAN) injection 4 mg  4 mg Intravenous Q6H PRN Catina Villegas, APRN       • PATIENT SUPPLIED MEDICATION  1 drop Left Eye Nightly Catina Villegas, APRN       • pravastatin (PRAVACHOL) tablet 10 mg  10 mg Oral Nightly Catina Villegas APRN   10 mg at 03/18/20 2313   • sertraline (ZOLOFT) tablet 25 mg  25 mg Oral Daily Catina Villegas, APRN   25 mg at 03/19/20 0828   • sevelamer (RENVELA) tablet 800 mg  800 mg Oral TID With Meals Catina Villegas, APRN   800 mg at 03/19/20 0828   • sodium chloride 0.9 % flush 10 mL  10 mL Intravenous PRN Catina Villegas, APRN       • sodium chloride 0.9 % flush 10 mL  10 mL Intravenous Q12H Catina Villegas, APRN   10 mL at 03/19/20 0830   • sodium chloride 0.9 % flush 10 mL  10 mL Intravenous PRN Catina Villegas, APRN       • timolol (TIMOPTIC) 0.5 % ophthalmic solution 1 drop  1 drop Left Eye Daily Catina Villegas, APRN   1 drop at 03/19/20 0830       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    • Chronic otitis media    • Diabetes (CMS/HCC)    • End stage renal disease on dialysis (CMS/[facility])    • Eustachian tube dysfunction    • Glaucoma    • HTN (hypertension)    • Mucoid otitis media    • Noncompliance of patient with renal dialysis (CMS/HCC)    • Tobacco abuse        Past Surgical History:  Past Surgical History:   Procedure Laterality Date   • ARTERIOVENOUS FISTULA     •  "CATARACT EXTRACTION WITH INTRAOCULAR LENS IMPLANT     •  SECTION     • CHOLECYSTECTOMY     • MYRINGOTOMY W/ TUBES Bilateral    • MYRINGOTOMY W/ TUBES Right    • TUBAL ABDOMINAL LIGATION         Family History  Family History   Problem Relation Age of Onset   • Heart disease Mother    • Diabetes Father        Social History  Social History     Socioeconomic History   • Marital status:      Spouse name: Not on file   • Number of children: Not on file   • Years of education: Not on file   • Highest education level: Not on file   Tobacco Use   • Smoking status: Current Every Day Smoker     Packs/day: 2.00     Years: 6.00     Pack years: 12.00     Types: Cigarettes   • Smokeless tobacco: Never Used   Substance and Sexual Activity   • Alcohol use: No   • Drug use: No   • Sexual activity: Defer         Review of Systems:  History obtained from chart review and the patient  General ROS: Patient complains of malaise and No fever or chills  Respiratory ROS: No cough, shortness of breath, wheezing  Cardiovascular ROS: No chest pain or palpitations  Gastrointestinal ROS: positive for - diarrhea and nausea/vomiting  No abdominal pain or melena  Genito-Urinary ROS: No dysuria or hematuria  Neurological ROS: no headache or dizziness  14 point ROS reviewed with the patient and negative except as noted above and in the HPI unless unable to obtain.    Objective:  Patient Vitals for the past 24 hrs:   BP Temp Temp src Pulse Resp SpO2 Height Weight   20 0735 152/67 97.6 °F (36.4 °C) Oral 75 18 94 % -- --   20 0458 116/54 98.1 °F (36.7 °C) Oral 70 18 96 % -- --   20 2313 147/67 -- -- 81 -- -- -- --   20 2059 163/73 98.7 °F (37.1 °C) Oral 81 18 96 % 149.9 cm (59.02\") 61.1 kg (134 lb 12.8 oz)   20 2030 160/72 98.5 °F (36.9 °C) Oral 83 18 97 % -- --   20 1809 -- -- -- 86 -- 100 % -- --   20 1615 (!) 188/70 98.1 °F (36.7 °C) Oral 81 18 97 % 149.9 cm (59\") 60.8 kg (134 lb) "       Intake/Output Summary (Last 24 hours) at 3/19/2020 0946  Last data filed at 3/19/2020 0836  Gross per 24 hour   Intake 500 ml   Output 100 ml   Net 400 ml     General: awake/alert    Neck: supple, no JVD  Chest:  clear to auscultation bilaterally without respiratory distress  CVS: regular rate and rhythm  Abdominal: soft, nontender, positive bowel sounds  Extremities: no cyanosis or edema  Skin: warm and dry without rash  Neuro: no focal motor deficits    Labs:  Results from last 7 days   Lab Units 03/19/20  0507 03/18/20  1633   WBC 10*3/mm3 6.44 8.98   HEMOGLOBIN g/dL 9.8* 10.9*   HEMATOCRIT % 31.6* 35.1   PLATELETS 10*3/mm3 305 381         Results from last 7 days   Lab Units 03/19/20  0507 03/18/20  1633   SODIUM mmol/L 137 136   POTASSIUM mmol/L 4.4 5.2   CHLORIDE mmol/L 92* 87*   CO2 mmol/L 28.0 28.0   BUN mg/dL 40* 38*   CREATININE mg/dL 9.26* 8.28*   CALCIUM mg/dL 9.0 9.8   BILIRUBIN mg/dL  --  0.3   ALK PHOS U/L  --  323*   ALT (SGPT) U/L  --  7   AST (SGOT) U/L  --  12   GLUCOSE mg/dL 176* 135*       Radiology:   Imaging Results (Last 72 Hours)     Procedure Component Value Units Date/Time    CT Abdomen Pelvis Without Contrast [[id number]] Collected:  03/18/20 1752     Updated:  03/18/20 1759    Narrative:       EXAMINATION:   CT ABDOMEN PELVIS WO CONTRAST-  3/18/2020 5:52 PM CDT     HISTORY: CT ABDOMEN PELVIS WO CONTRAST- 3/18/2020 5:41 PM CDT     HISTORY: Abdominal pain, unspecified; R11.2-Nausea with vomiting,  unspecified; R19.7-Diarrhea, unspecified      COMPARISON: None      DOSE LENGTH PRODUCT: 285 mGy cm. Automated exposure control was also  utilized to decrease patient radiation dose.     TECHNIQUE: Noncontrast enhanced images of the abdomen and pelvis  obtained without oral contrast. Multiplanar reformatted images were  provided for review.      FINDINGS:   Right posterior pleural effusion is present. Smaller left pleural  effusion is noted. Trace pericardial effusion is present..       LIVER: No focal liver lesion.      BILIARY SYSTEM: The gallbladder is surgically absent.      PANCREAS: No focal pancreatic lesion.      SPLEEN: Unremarkable.      KIDNEYS AND ADRENALS: Bilateral kidneys and adrenal glands are  unremarkable.  Vascular calcifications are noted in the renal arteries..     RETROPERITONEUM: No mass, lymphadenopathy or hemorrhage.      GI TRACT: No evidence of obstruction or bowel wall thickening. The  appendix is visualized. Diverticulosis is present..     OTHER: There is no mesenteric mass, lymphadenopathy or fluid collection.  The osseous structures and soft tissues demonstrate no worrisome  lesions. Extensive vascular calcifications present abdominal aorta and  iliac arteries.      PELVIS: No mass lesion, fluid collection or significant lymphadenopathy  is seen in the pelvis. The urinary bladder is normal in appearance.       Impression:       1. Diverticulosis.  2. Extensive atherosclerotic vascular calcification and plaque is noted  in the abdominal aorta and iliac arteries.  3. Moderate right posterior pleural effusion, smaller left pleural  effusion and small pericardial effusion.         This report was finalized on 03/18/2020 17:56 by Dr. Tony Malik MD.          Culture:  No results found for: BLOODCX, URINECX, WOUNDCX, MRSACX, RESPCX, STOOLCX      Assessment   1.  End stage renal disease on HD TTS  2.  Type 2 diabetes  3.  Hypertension  4.  Nausea/vomiting/diarrhea  5.  Anemia of CKD    Plan:  1.  Dialysis today  2.  Monitor labs  3.  Further assessment and plan pending Dr Boyd's evaluation      Thank you for the consult, we appreciate the opportunity to provide care to your patients.  Feel free to contact me if I can be of any further assistance.      Gomez Lemos, ANTHONY  3/19/2020  09:46

## 2020-03-19 NOTE — H&P
[facility]  HISTORY AND PHYSICAL    Date of Admission: 3/18/2020  Primary Care Physician: Bharati Dahl APRN    Subjective     Chief Complaint: Weakness, diarrhea, nausea/vomiting    History of Present Illness  Mini Gentile is a 59-year-old female with a past medical history of end-stage kidney disease on hemodialysis -multiple documentation notes regarding noncompliance with dialysis; insulin-dependent diabetes, glaucoma left, hypertension, hypothyroidism, near blindness.  Patient presented to [facility] today after a 5-day history of nausea vomiting and diarrhea.  She states she was so sick on Tuesday that dialysis clinic refused to complete her dialysis due to her illness.  She states she is having 4-5 watery stools per day.  She denies blood.  She states she did have a small meal yesterday that she did not throw up however this morning she was unable to keep any food down.  She denies coffee-ground emesis or bright red blood.  She has no complaints of pain, shortness of breathing or edema.  Patient does report she is smoking 2 packs of cigarettes per day.  ER evaluation revealed creatinine of 8.28.  Patient is without acute distress.  Alkaline phosphatase is 323 remainder of LFTs within normal limits.  She has no other significant acute abnormalities.  No significant acute findings noted on CT of the abdomen pelvis.  Patient is admitted for further evaluation treatment.    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    • Chronic otitis media    • Diabetes (CMS/[facility])    • End stage renal disease on dialysis (CMS/[facility])    • Eustachian tube dysfunction    • Glaucoma    • HTN (hypertension)    • Mucoid otitis media    • Noncompliance of patient with renal dialysis  (CMS/[facility])    • Tobacco abuse        Past Surgical History:   Past Surgical History:   Procedure Laterality Date   • ARTERIOVENOUS FISTULA     • CATARACT EXTRACTION WITH INTRAOCULAR LENS IMPLANT     •  SECTION     • CHOLECYSTECTOMY     • MYRINGOTOMY W/ TUBES Bilateral    • MYRINGOTOMY W/ TUBES Right    • TUBAL ABDOMINAL LIGATION         Family History: family history includes Diabetes in her father; Heart disease in her mother.    Social History:  reports that she has been smoking cigarettes. She has a 12.00 pack-year smoking history. She has never used smokeless tobacco. She reports that she does not drink alcohol or use drugs.    Code Status: Full, if unable speak for self Sundeep Bowling will speak for her      Allergies:  Allergies   Allergen Reactions   • Bupropion Nausea Only   • Chantix [Varenicline]    • Sulfa Antibiotics    • Azithromycin Rash   • Levofloxacin Rash   • Penicillins Rash       Medications:  Prior to Admission medications    Medication Sig Start Date End Date Taking? Authorizing Provider   albuterol sulfate  (90 Base) MCG/ACT inhaler Inhale 2 puffs Every 4 (Four) Hours As Needed for Wheezing. 20   Sundeep Aldridge MD   aspirin 81 MG EC tablet Take 81 mg by mouth Daily.    ProviderSteve MD   brimonidine (ALPHAGAN) 0.2 % ophthalmic solution Administer 1 drop into the left eye 3 (Three) Times a Day.    Steve Warren MD   calcitriol (ROCALTROL) 0.25 MCG capsule Take 0.25 mcg by mouth Take As Directed. Only given on dialysis days (Tuesday, Thursday, Saturday)    Steve Warren MD   carvedilol (COREG) 12.5 MG tablet Take 1 tablet by mouth 2 (Two) Times a Day With Meals. 20   Sundeep Aldridge MD   cholecalciferol (VITAMIN D3) 1000 units tablet Take 2,000 Units by mouth Daily.    Steve Warren MD   dorzolamide (TRUSOPT) 2 % ophthalmic solution Administer 1 drop into the left eye 2 (Two) Times a Day.    Steve Warren MD    "  famotidine (PEPCID) 20 MG tablet Take 20 mg by mouth 2 (Two) Times a Day.    Steve Warren MD   ferrous sulfate 325 (65 FE) MG tablet Take 1 tablet by mouth Daily With Breakfast. 2/9/20   Lyndon Lockett MD   fluticasone (FLONASE) 50 MCG/ACT nasal spray 2 sprays into the nostril(s) as directed by provider 2 (Two) Times a Day.    Steve Warren MD   homatropine 5 % ophthalmic solution Administer 1 drop into the left eye Daily.    Steve Warren MD   insulin glargine (LANTUS) 100 UNIT/ML injection Inject 10 Units under the skin Every Night.    Steve Warren MD   levothyroxine (SYNTHROID, LEVOTHROID) 25 MCG tablet Take 1 tablet by mouth Daily. 2/9/20   Lyndon Lockett MD   lidocaine-prilocaine (EMLA) 2.5-2.5 % cream Apply 1 application topically to the appropriate area as directed Take As Directed. Apply to access area 30 minutes prior to dialysis.     Steve Warren MD   losartan (COZAAR) 50 MG tablet Take 50 mg by mouth Daily.    Steve Warrne MD   Netarsudil Dimesylate (RHOPRESSA) 0.02 % solution Administer 1 drop into the left eye Every Night.    Steve Warren MD   pravastatin (PRAVACHOL) 10 MG tablet Take 10 mg by mouth Every Night.    Steve Warren MD   sertraline (ZOLOFT) 25 MG tablet Take 25 mg by mouth Daily.    Steve Warren MD   sevelamer (RENAGEL) 800 MG tablet Take 2,400 mg by mouth 3 (Three) Times a Day With Meals.    Steve Warren MD   timolol (TIMOPTIC) 0.5 % ophthalmic solution Administer 1 drop into the left eye Every Morning.    Steve Warren MD   travoprost, JUDE free, (TRAVATAN Z) 0.004 % solution ophthalmic solution Administer 1 drop into the left eye Every Night.    Steve Warren MD       Objective     /72 (BP Location: Right arm, Patient Position: Lying)   Pulse 83   Temp 98.5 °F (36.9 °C) (Oral)   Resp 18   Ht 149.9 cm (59\")   Wt 60.8 kg (134 lb)   SpO2 97%   BMI 27.06 kg/m²   Physical " Exam   Constitutional: She is oriented to person, place, and time. She appears well-developed and well-nourished. No distress.   HENT:   Head: Normocephalic and atraumatic.   Eyes: Pupils are equal, round, and reactive to light. Conjunctivae and EOM are normal. No scleral icterus.   Neck: Normal range of motion. Neck supple. No JVD present. No tracheal deviation present.   Cardiovascular: Normal rate, regular rhythm, normal heart sounds and intact distal pulses. Exam reveals no gallop.   No murmur heard.  Pulmonary/Chest: Effort normal and breath sounds normal. No respiratory distress. She has no wheezes. She has no rales.   Abdominal: Soft. Bowel sounds are normal. She exhibits no distension. There is no tenderness. There is no guarding.   Musculoskeletal: Normal range of motion. She exhibits no edema.   Neurological: She is alert and oriented to person, place, and time.   Skin: Skin is warm and dry. No rash noted. She is not diaphoretic. No erythema. No pallor.   Sallow   Psychiatric: She has a normal mood and affect. Her behavior is normal.   Vitals reviewed.      Pertinent Data:   Lab Results (last 72 hours)     Procedure Component Value Units Date/Time    Lactic Acid, Plasma [[id number]]  (Normal) Collected:  03/18/20 1820    Specimen:  Blood Updated:  03/18/20 1850     Lactate 1.3 mmol/L     Urinalysis With Microscopic If Indicated (No Culture) - Urine, Clean Catch [[id number]]  (Abnormal) Collected:  03/18/20 1633    Specimen:  Urine, Clean Catch Updated:  03/18/20 1751     Color, UA Yellow     Appearance, UA Clear     pH, UA 8.5     Specific Gravity, UA 1.012     Glucose,  mg/dL (Trace)     Ketones, UA Negative     Bilirubin, UA Negative     Blood, UA Trace     Protein, UA >=300 mg/dL (3+)     Leuk Esterase, UA Trace     Nitrite, UA Negative     Urobilinogen, UA 0.2 E.U./dL    Urinalysis, Microscopic Only - Urine, Clean Catch [[id number]]  (Abnormal) Collected:  03/18/20 1633    Specimen:  Urine, Clean  Catch Updated:  03/18/20 1751     RBC, UA 0-2 /HPF      WBC, UA 0-2 /HPF      Bacteria, UA None Seen /HPF      Squamous Epithelial Cells, UA 0-2 /HPF      Hyaline Casts, UA None Seen /LPF      Methodology Manual Light Microscopy    Comprehensive Metabolic Panel [[id number]]  (Abnormal) Collected:  03/18/20 1633    Specimen:  Blood Updated:  03/18/20 1714     Glucose 135 mg/dL      BUN 38 mg/dL      Creatinine 8.28 mg/dL      Sodium 136 mmol/L      Potassium 5.2 mmol/L      Chloride 87 mmol/L      CO2 28.0 mmol/L      Calcium 9.8 mg/dL      Total Protein 8.0 g/dL      Albumin 4.20 g/dL      ALT (SGPT) 7 U/L      AST (SGOT) 12 U/L      Alkaline Phosphatase 323 U/L      Total Bilirubin 0.3 mg/dL      eGFR Non African Amer 5 mL/min/1.73      Comment: <15 Indicative of kidney failure.        eGFR   Amer --     Comment: <15 Indicative of kidney failure.        Globulin 3.8 gm/dL      A/G Ratio 1.1 g/dL      BUN/Creatinine Ratio 4.6     Anion Gap 21.0 mmol/L     Lipase [[id number]]  (Normal) Collected:  03/18/20 1633    Specimen:  Blood Updated:  03/18/20 1708     Lipase 31 U/L     CBC Auto Differential [[id number]]  (Abnormal) Collected:  03/18/20 1633    Specimen:  Blood Updated:  03/18/20 1705     WBC 8.98 10*3/mm3      RBC 3.80 10*6/mm3      Hemoglobin 10.9 g/dL      Hematocrit 35.1 %      MCV 92.4 fL      MCH 28.7 pg      MCHC 31.1 g/dL      RDW 15.7 %      RDW-SD 53.2 fl      MPV 9.0 fL      Platelets 381 10*3/mm3      Neutrophil % 74.0 %      Lymphocyte % 15.9 %      Monocyte % 5.5 %      Eosinophil % 4.0 %      Basophil % 0.3 %      Immature Grans % 0.3 %      Neutrophils, Absolute 6.64 10*3/mm3      Lymphocytes, Absolute 1.43 10*3/mm3      Monocytes, Absolute 0.49 10*3/mm3      Eosinophils, Absolute 0.36 10*3/mm3      Basophils, Absolute 0.03 10*3/mm3      Immature Grans, Absolute 0.03 10*3/mm3      nRBC 0.0 /100 WBC         Imaging Results (Last 24 Hours)     Procedure Component Value Units Date/Time    CT  Abdomen Pelvis Without Contrast [[id number]] Collected:  03/18/20 1752     Updated:  03/18/20 1759    Narrative:       EXAMINATION:   CT ABDOMEN PELVIS WO CONTRAST-  3/18/2020 5:52 PM CDT     HISTORY: CT ABDOMEN PELVIS WO CONTRAST- 3/18/2020 5:41 PM CDT     HISTORY: Abdominal pain, unspecified; R11.2-Nausea with vomiting,  unspecified; R19.7-Diarrhea, unspecified      COMPARISON: None      DOSE LENGTH PRODUCT: 285 mGy cm. Automated exposure control was also  utilized to decrease patient radiation dose.     TECHNIQUE: Noncontrast enhanced images of the abdomen and pelvis  obtained without oral contrast. Multiplanar reformatted images were  provided for review.      FINDINGS:   Right posterior pleural effusion is present. Smaller left pleural  effusion is noted. Trace pericardial effusion is present..      LIVER: No focal liver lesion.      BILIARY SYSTEM: The gallbladder is surgically absent.      PANCREAS: No focal pancreatic lesion.      SPLEEN: Unremarkable.      KIDNEYS AND ADRENALS: Bilateral kidneys and adrenal glands are  unremarkable.  Vascular calcifications are noted in the renal arteries..     RETROPERITONEUM: No mass, lymphadenopathy or hemorrhage.      GI TRACT: No evidence of obstruction or bowel wall thickening. The  appendix is visualized. Diverticulosis is present..     OTHER: There is no mesenteric mass, lymphadenopathy or fluid collection.  The osseous structures and soft tissues demonstrate no worrisome  lesions. Extensive vascular calcifications present abdominal aorta and  iliac arteries.      PELVIS: No mass lesion, fluid collection or significant lymphadenopathy  is seen in the pelvis. The urinary bladder is normal in appearance.       Impression:       1. Diverticulosis.  2. Extensive atherosclerotic vascular calcification and plaque is noted  in the abdominal aorta and iliac arteries.  3. Moderate right posterior pleural effusion, smaller left pleural  effusion and small pericardial effusion.          This report was finalized on 03/18/2020 17:56 by Dr. Tony Malik MD.          I have personally reviewed and interpreted the radiology studies and ECG obtained at time of admission.     Assessment / Plan     Assessment:   Active Hospital Problems    Diagnosis   • Nausea vomiting and diarrhea   • Non-compliance with renal dialysis (CMS/[facility])   • Type 2 diabetes mellitus, with long-term current use of insulin (CMS/[facility])   • End stage renal failure on dialysis (CMS/[facility])   • Glaucoma       Plan:   1.  Admit as observation  2.  Consult nephrology, missed dialysis yesterday due to Tuesday-Thursday- Saturday  3.  Monitor glucose before meals and at bedtime with regular insulin sliding scale coverage  4.  DVT prophylaxis with SCDs  5.  NicoDerm patch  6.  Labs in a.m.  7.  Home medications reviewed and restarted as appropriate  8.  Stool panel  9.  Clear liquid diet and advance as tolerated  10.  Supplemental oxygen as needed, incentive spirometry, albuterol neb as needed    I discussed the patient's findings and my recommendations with: Tejinder Kent MD  Time spent: 40 minutes    Patient seen and examined by me on 3/18/2020 at 7:35 PM.    ANTHONY Galeano  03/18/20   21:24     I personally evaluated and examined the patient in conjunction with ANTHONY Galeano and agree with the assessment, treatment plan, and disposition of the patient as recorded by her. My history, exam, and further recommendations are:     Patient eval at time of admission agree with all assessment plans and examination as above.    Tejinder Kent MD  03/19/20  08:30

## 2020-03-20 VITALS
DIASTOLIC BLOOD PRESSURE: 60 MMHG | RESPIRATION RATE: 16 BRPM | OXYGEN SATURATION: 94 % | SYSTOLIC BLOOD PRESSURE: 140 MMHG | HEIGHT: 59 IN | HEART RATE: 85 BPM | WEIGHT: 135.44 LBS | BODY MASS INDEX: 27.3 KG/M2 | TEMPERATURE: 98.5 F

## 2020-03-20 LAB
ANION GAP SERPL CALCULATED.3IONS-SCNC: 17 MMOL/L (ref 5–15)
BUN BLD-MCNC: 17 MG/DL (ref 8–23)
BUN/CREAT SERPL: 3.4 (ref 7–25)
CALCIUM SPEC-SCNC: 8.9 MG/DL (ref 8.6–10.5)
CHLORIDE SERPL-SCNC: 90 MMOL/L (ref 98–107)
CO2 SERPL-SCNC: 27 MMOL/L (ref 22–29)
CREAT BLD-MCNC: 5.03 MG/DL (ref 0.57–1)
DEPRECATED RDW RBC AUTO: 52.9 FL (ref 37–54)
ERYTHROCYTE [DISTWIDTH] IN BLOOD BY AUTOMATED COUNT: 15.5 % (ref 12.3–15.4)
GFR SERPL CREATININE-BSD FRML MDRD: 9 ML/MIN/1.73
GFR SERPL CREATININE-BSD FRML MDRD: ABNORMAL ML/MIN/{1.73_M2}
GLUCOSE BLD-MCNC: 214 MG/DL (ref 65–99)
GLUCOSE BLDC GLUCOMTR-MCNC: 168 MG/DL (ref 70–130)
HCT VFR BLD AUTO: 31.6 % (ref 34–46.6)
HGB BLD-MCNC: 10 G/DL (ref 12–15.9)
MCH RBC QN AUTO: 29.2 PG (ref 26.6–33)
MCHC RBC AUTO-ENTMCNC: 31.6 G/DL (ref 31.5–35.7)
MCV RBC AUTO: 92.4 FL (ref 79–97)
PLATELET # BLD AUTO: 343 10*3/MM3 (ref 140–450)
PMV BLD AUTO: 9.2 FL (ref 6–12)
POTASSIUM BLD-SCNC: 4.3 MMOL/L (ref 3.5–5.2)
RBC # BLD AUTO: 3.42 10*6/MM3 (ref 3.77–5.28)
SODIUM BLD-SCNC: 134 MMOL/L (ref 136–145)
WBC NRBC COR # BLD: 6.02 10*3/MM3 (ref 3.4–10.8)

## 2020-03-20 PROCEDURE — 80048 BASIC METABOLIC PNL TOTAL CA: CPT | Performed by: NURSE PRACTITIONER

## 2020-03-20 PROCEDURE — 63710000001 INSULIN LISPRO (HUMAN) PER 5 UNITS: Performed by: NURSE PRACTITIONER

## 2020-03-20 PROCEDURE — 82962 GLUCOSE BLOOD TEST: CPT

## 2020-03-20 PROCEDURE — 85027 COMPLETE CBC AUTOMATED: CPT | Performed by: NURSE PRACTITIONER

## 2020-03-20 PROCEDURE — G0378 HOSPITAL OBSERVATION PER HR: HCPCS

## 2020-03-20 PROCEDURE — 25010000002 EPOETIN ALFA-EPBX 10000 UNIT/ML SOLUTION: Performed by: INTERNAL MEDICINE

## 2020-03-20 PROCEDURE — 96372 THER/PROPH/DIAG INJ SC/IM: CPT

## 2020-03-20 RX ADMIN — BRIMONIDINE TARTRATE 1 DROP: 2 SOLUTION OPHTHALMIC at 08:28

## 2020-03-20 RX ADMIN — FERROUS SULFATE TAB 325 MG (65 MG ELEMENTAL FE) 325 MG: 325 (65 FE) TAB at 08:24

## 2020-03-20 RX ADMIN — SEVELAMER CARBONATE 800 MG: 800 TABLET, FILM COATED ORAL at 08:24

## 2020-03-20 RX ADMIN — TIMOLOL MALEATE 1 DROP: 5 SOLUTION/ DROPS OPHTHALMIC at 08:28

## 2020-03-20 RX ADMIN — ASPIRIN 81 MG: 81 TABLET ORAL at 08:24

## 2020-03-20 RX ADMIN — FLUTICASONE PROPIONATE 2 SPRAY: 50 SPRAY, METERED NASAL at 08:25

## 2020-03-20 RX ADMIN — ATROPINE SULFATE 1 DROP: 10 SOLUTION/ DROPS OPHTHALMIC at 08:28

## 2020-03-20 RX ADMIN — Medication 250 MG: at 08:25

## 2020-03-20 RX ADMIN — LOSARTAN POTASSIUM 50 MG: 25 TABLET, FILM COATED ORAL at 08:24

## 2020-03-20 RX ADMIN — DORZOLAMIDE HYDROCHLORIDE 1 DROP: 20 SOLUTION/ DROPS OPHTHALMIC at 08:28

## 2020-03-20 RX ADMIN — LEVOTHYROXINE SODIUM 25 MCG: 25 TABLET ORAL at 06:23

## 2020-03-20 RX ADMIN — EPOETIN ALFA-EPBX 10000 UNITS: 10000 INJECTION, SOLUTION INTRAVENOUS; SUBCUTANEOUS at 08:24

## 2020-03-20 RX ADMIN — CARVEDILOL 12.5 MG: 6.25 TABLET, FILM COATED ORAL at 08:24

## 2020-03-20 RX ADMIN — INSULIN LISPRO 2 UNITS: 100 INJECTION, SOLUTION INTRAVENOUS; SUBCUTANEOUS at 08:25

## 2020-03-20 RX ADMIN — VITAMIN D 2000 UNITS: 25 TAB ORAL at 08:24

## 2020-03-20 RX ADMIN — SERTRALINE 25 MG: 50 TABLET, FILM COATED ORAL at 08:25

## 2020-03-20 NOTE — PROGRESS NOTES
Nephrology ([facility]) Progress Note      Patient:  Mini Gentile  YOB: 1960  Date of Service: 3/20/2020  MRN: [id number]   Acct: [id number]   Primary Care Physician: Bharati Dahl APRN  Advance Directive:   Code Status and Medical Interventions:   Ordered at: 03/18/20 2016     Level Of Support Discussed With:    Patient     Code Status:    CPR     Medical Interventions (Level of Support Prior to Arrest):    Full     Admit Date: 3/18/2020       Hospital Day: 0  Referring Provider: Tejinder Kent MD      Patient personally seen and examined.  Complete chart including Consults, Notes, Operative Reports, Labs, Cardiology, and Radiology studies reviewed as able.        Subjective:  Mini Gentile is a 60 y.o. female  whom we were consulted for end stage renal disease.  Maintenance hemodialysis Tuesday Thursday Saturday at [facility]. Last dialysis was on 3/14; did not go on 3/17 due to illness. History of type 2 diabetes, hypertension, ongoing tobacco abuse.  Presented to emergency department with several days of nausea/vomiting and diarrhea.  tolerated dialysis well on 3/19.    Today is feeling better; no nausea but still has diarrhea.  No new overnight issues.    Allergies:  Bupropion; Chantix [varenicline]; Sulfa antibiotics; Azithromycin; Levofloxacin; and Penicillins    Home Meds:  Medications Prior to Admission   Medication Sig Dispense Refill Last Dose   • albuterol sulfate  (90 Base) MCG/ACT inhaler Inhale 2 puffs Every 4 (Four) Hours As Needed for Wheezing. 18 g 0 Past Week at Unknown time   • aspirin 81 MG EC tablet Take 81 mg by mouth Daily.   Past Week at Unknown time   • bismuth subsalicylate (PEPTO BISMOL) 262 MG chewable tablet Chew 262 mg Daily As Needed for Indigestion.   Past Week at Unknown time   • brimonidine (ALPHAGAN) 0.2 % ophthalmic solution Administer 1 drop into the left eye 3 (Three) Times a Day.   3/18/2020 at  Unknown time   • calcitriol (ROCALTROL) 0.25 MCG capsule Take 0.25 mcg by mouth Take As Directed. Only given on dialysis days (Tuesday, Thursday, Saturday)   3/17/2020 at Unknown time   • carvedilol (COREG) 12.5 MG tablet Take 1 tablet by mouth 2 (Two) Times a Day With Meals. 60 tablet 0 3/18/2020 at Unknown time   • cholecalciferol (VITAMIN D3) 1000 units tablet Take 2,000 Units by mouth Daily.   3/18/2020 at Unknown time   • dorzolamide (TRUSOPT) 2 % ophthalmic solution Administer 1 drop into the left eye 2 (Two) Times a Day.   3/18/2020 at Unknown time   • famotidine (PEPCID) 20 MG tablet Take 20 mg by mouth 2 (Two) Times a Day.   3/18/2020 at Unknown time   • fluticasone (FLONASE) 50 MCG/ACT nasal spray 2 sprays into the nostril(s) as directed by provider 2 (Two) Times a Day.   Past Week at Unknown time   • homatropine 5 % ophthalmic solution Administer 1 drop into the left eye Daily.   Past Week at Unknown time   • insulin glargine (LANTUS) 100 UNIT/ML injection Inject 10 Units under the skin Every Night.   3/18/2020 at Unknown time   • Latanoprostene Bunod (Vyzulta) 0.024 % solution Apply 1 drop to eye(s) as directed by provider Every Night. Left eye   Past Week at Unknown time   • levothyroxine (SYNTHROID, LEVOTHROID) 25 MCG tablet Take 1 tablet by mouth Daily. 30 tablet 2 3/18/2020 at Unknown time   • losartan (COZAAR) 50 MG tablet Take 50 mg by mouth 2 (Two) Times a Day.   3/18/2020 at Unknown time   • Netarsudil Dimesylate (RHOPRESSA) 0.02 % solution Administer 1 drop into the left eye Every Night.   Past Week at Unknown time   • pravastatin (PRAVACHOL) 10 MG tablet Take 10 mg by mouth Every Night.   3/18/2020 at Unknown time   • sertraline (ZOLOFT) 25 MG tablet Take 25 mg by mouth Daily.   3/18/2020 at Unknown time   • sevelamer (RENAGEL) 800 MG tablet Take 2,400 mg by mouth 3 (Three) Times a Day With Meals.   3/18/2020 at Unknown time   • timolol (TIMOPTIC) 0.5 % ophthalmic solution Administer 1 drop  into the left eye Every Morning.   Past Week at Unknown time       Medicines:  Current Facility-Administered Medications   Medication Dose Route Frequency Provider Last Rate Last Dose   • albuterol (PROVENTIL) nebulizer solution 0.083% 2.5 mg/3mL  2.5 mg Nebulization Q6H PRN Catina Villegas APRN       • aspirin EC tablet 81 mg  81 mg Oral Daily Ctaina Villegas APRN   81 mg at 03/20/20 0824   • atropine 1 % ophthalmic solution 1 drop  1 drop Left Eye Daily Catina Villegas APRN   1 drop at 03/20/20 0828   • brimonidine (ALPHAGAN) 0.2 % ophthalmic solution 1 drop  1 drop Left Eye TID Catina Villegas APRN   1 drop at 03/20/20 0828   • carvedilol (COREG) tablet 12.5 mg  12.5 mg Oral BID With Meals Catina Villegas APRN   12.5 mg at 03/20/20 0824   • cholecalciferol (VITAMIN D3) tablet 2,000 Units  2,000 Units Oral Daily Catina Villegas APRN   2,000 Units at 03/20/20 0824   • dextrose (D50W) 25 g/ 50mL Intravenous Solution 25 g  25 g Intravenous Q15 Min PRN Catina Villegas APRN       • dextrose (GLUTOSE) oral gel 15 g  15 g Oral Q15 Min PRN Catina Villegas APRN       • dorzolamide (TRUSOPT) 2 % ophthalmic solution 1 drop  1 drop Left Eye BID Catina Villegas APRN   1 drop at 03/20/20 0828   • epoetin vika-epbx (RETACRIT) injection 10,000 Units  10,000 Units Subcutaneous Once per day on Mon Wed Fri Ten Boyd MD   10,000 Units at 03/20/20 0824   • famotidine (PEPCID) tablet 20 mg  20 mg Oral Once per day on Tue Thu Sat Catina Villegas APRN   20 mg at 03/19/20 1251   • ferrous sulfate tablet 325 mg  325 mg Oral Daily With Breakfast Catina Villegas APRN   325 mg at 03/20/20 0824   • fluticasone (FLONASE) 50 MCG/ACT nasal spray 2 spray  2 spray Nasal BID Catina Villegas APRN   2 spray at 03/20/20 0825   • glucagon (human recombinant) (GLUCAGEN DIAGNOSTIC) injection 1 mg  1 mg Subcutaneous Q15 Min PRN Catina iVllegas, APRN       • insulin lispro (humaLOG) injection 2-7 Units   2-7 Units Subcutaneous 4x Daily With Meals & Nightly Catina Villegas, APRN   2 Units at 03/20/20 0825   • Latanoprostene Bunod 0.024 % solution 1 drop  1 drop Left Eye Nightly Catina Villegas, APRN   1 drop at 03/19/20 2049   • levothyroxine (SYNTHROID, LEVOTHROID) tablet 25 mcg  25 mcg Oral Q AM Catina Villegas, APRN   25 mcg at 03/20/20 0623   • losartan (COZAAR) tablet 50 mg  50 mg Oral Daily Catina Villegas, APRN   50 mg at 03/20/20 0824   • Netarsudil Dimesylate 0.02 % solution 1 drop  1 drop Left Eye Nightly Catina Villegas, APRN   1 drop at 03/19/20 2105   • nicotine (NICODERM CQ) 21 MG/24HR patch 1 patch  1 patch Transdermal Q24H Catina Villegas, APRN   1 patch at 03/19/20 2046   • ondansetron (ZOFRAN) tablet 4 mg  4 mg Oral Q6H PRN Catina Villegas, APRN        Or   • ondansetron (ZOFRAN) injection 4 mg  4 mg Intravenous Q6H PRN Catina Villegas, APRN       • pravastatin (PRAVACHOL) tablet 10 mg  10 mg Oral Nightly Catina Villegas APRN   10 mg at 03/19/20 2048   • saccharomyces boulardii (FLORASTOR) capsule 250 mg  250 mg Oral BID Sarahy Mike, APRN   250 mg at 03/20/20 0825   • sertraline (ZOLOFT) tablet 25 mg  25 mg Oral Daily Catina Villegas APRN   25 mg at 03/20/20 0825   • sevelamer (RENVELA) tablet 800 mg  800 mg Oral TID With Meals Catina Villegas APRN   800 mg at 03/20/20 0824   • sodium chloride 0.9 % flush 10 mL  10 mL Intravenous PRN Catina Villegas, APRN       • sodium chloride 0.9 % flush 10 mL  10 mL Intravenous Q12H Catina Villegas, APRN   10 mL at 03/19/20 0830   • sodium chloride 0.9 % flush 10 mL  10 mL Intravenous PRN Catina Villegas, APRN       • timolol (TIMOPTIC) 0.5 % ophthalmic solution 1 drop  1 drop Left Eye Daily Catina Villegas APRN   1 drop at 03/20/20 0828       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    • Chronic otitis media    • Diabetes (CMS/[facility])    • End stage  "renal disease on dialysis (CMS/[facility])    • Eustachian tube dysfunction    • Glaucoma    • HTN (hypertension)    • Mucoid otitis media    • Noncompliance of patient with renal dialysis (CMS/[facility])    • Tobacco abuse        Past Surgical History:  Past Surgical History:   Procedure Laterality Date   • ARTERIOVENOUS FISTULA     • CATARACT EXTRACTION WITH INTRAOCULAR LENS IMPLANT     •  SECTION     • CHOLECYSTECTOMY     • MYRINGOTOMY W/ TUBES Bilateral    • MYRINGOTOMY W/ TUBES Right    • TUBAL ABDOMINAL LIGATION         Family History  Family History   Problem Relation Age of Onset   • Heart disease Mother    • Diabetes Father        Social History  Social History     Socioeconomic History   • Marital status:      Spouse name: Not on file   • Number of children: Not on file   • Years of education: Not on file   • Highest education level: Not on file   Tobacco Use   • Smoking status: Current Every Day Smoker     Packs/day: 2.00     Years: 6.00     Pack years: 12.00     Types: Cigarettes   • Smokeless tobacco: Never Used   Substance and Sexual Activity   • Alcohol use: No   • Drug use: No   • Sexual activity: Defer         Review of Systems:  History obtained from chart review and the patient  General ROS: Patient complains of fatigue and No fever or chills  Respiratory ROS: No cough, shortness of breath, wheezing  Cardiovascular ROS: No chest pain or palpitations  Gastrointestinal ROS: positive for - diarrhea  No abdominal pain or melena  Genito-Urinary ROS: No dysuria or hematuria  Neurological ROS: no headache or dizziness    Objective:  Patient Vitals for the past 24 hrs:   BP Temp Temp src Pulse Resp SpO2 Height Weight   20 0933 140/60 -- -- -- -- -- -- --   20 0732 173/64 98.5 °F (36.9 °C) Oral 85 16 94 % -- --   20 0538 -- -- -- -- -- -- 149.9 cm (59\") 61.4 kg (135 lb 7 oz)   20 0321 148/51 98.7 °F (37.1 °C) Oral 78 16 94 % -- --   20 2315 133/54 98.1 °F (36.7 °C) Oral 81 " 16 94 % -- --   03/19/20 1945 131/52 99.5 °F (37.5 °C) Oral 77 16 94 % -- --   03/19/20 1558 152/56 99.5 °F (37.5 °C) Oral 80 16 98 % -- --   03/19/20 1348 142/59 98.4 °F (36.9 °C) Oral 80 18 94 % -- --       Intake/Output Summary (Last 24 hours) at 3/20/2020 1014  Last data filed at 3/20/2020 0933  Gross per 24 hour   Intake 960 ml   Output 800 ml   Net 160 ml     General: awake/alert    Neck: supple, no JVD  Chest:  clear to auscultation bilaterally without respiratory distress  CVS: regular rate and rhythm  Abdominal: soft, nontender, positive bowel sounds  Extremities: no cyanosis or edema  Skin: warm and dry without rash   Neuro: no focal motor deficits      Labs:  Results from last 7 days   Lab Units 03/20/20  0403 03/19/20  0507 03/18/20  1633   WBC 10*3/mm3 6.02 6.44 8.98   HEMOGLOBIN g/dL 10.0* 9.8* 10.9*   HEMATOCRIT % 31.6* 31.6* 35.1   PLATELETS 10*3/mm3 343 305 381         Results from last 7 days   Lab Units 03/20/20  0403 03/19/20  0507 03/18/20  1633   SODIUM mmol/L 134* 137 136   POTASSIUM mmol/L 4.3 4.4 5.2   CHLORIDE mmol/L 90* 92* 87*   CO2 mmol/L 27.0 28.0 28.0   BUN mg/dL 17 40* 38*   CREATININE mg/dL 5.03* 9.26* 8.28*   CALCIUM mg/dL 8.9 9.0 9.8   BILIRUBIN mg/dL  --   --  0.3   ALK PHOS U/L  --   --  323*   ALT (SGPT) U/L  --   --  7   AST (SGOT) U/L  --   --  12   GLUCOSE mg/dL 214* 176* 135*       Radiology:   Imaging Results (Last 72 Hours)     Procedure Component Value Units Date/Time    CT Abdomen Pelvis Without Contrast [[id number]] Collected:  03/18/20 1752     Updated:  03/18/20 1759    Narrative:       EXAMINATION:   CT ABDOMEN PELVIS WO CONTRAST-  3/18/2020 5:52 PM CDT     HISTORY: CT ABDOMEN PELVIS WO CONTRAST- 3/18/2020 5:41 PM CDT     HISTORY: Abdominal pain, unspecified; R11.2-Nausea with vomiting,  unspecified; R19.7-Diarrhea, unspecified      COMPARISON: None      DOSE LENGTH PRODUCT: 285 mGy cm. Automated exposure control was also  utilized to decrease patient radiation  dose.     TECHNIQUE: Noncontrast enhanced images of the abdomen and pelvis  obtained without oral contrast. Multiplanar reformatted images were  provided for review.      FINDINGS:   Right posterior pleural effusion is present. Smaller left pleural  effusion is noted. Trace pericardial effusion is present..      LIVER: No focal liver lesion.      BILIARY SYSTEM: The gallbladder is surgically absent.      PANCREAS: No focal pancreatic lesion.      SPLEEN: Unremarkable.      KIDNEYS AND ADRENALS: Bilateral kidneys and adrenal glands are  unremarkable.  Vascular calcifications are noted in the renal arteries..     RETROPERITONEUM: No mass, lymphadenopathy or hemorrhage.      GI TRACT: No evidence of obstruction or bowel wall thickening. The  appendix is visualized. Diverticulosis is present..     OTHER: There is no mesenteric mass, lymphadenopathy or fluid collection.  The osseous structures and soft tissues demonstrate no worrisome  lesions. Extensive vascular calcifications present abdominal aorta and  iliac arteries.      PELVIS: No mass lesion, fluid collection or significant lymphadenopathy  is seen in the pelvis. The urinary bladder is normal in appearance.       Impression:       1. Diverticulosis.  2. Extensive atherosclerotic vascular calcification and plaque is noted  in the abdominal aorta and iliac arteries.  3. Moderate right posterior pleural effusion, smaller left pleural  effusion and small pericardial effusion.         This report was finalized on 03/18/2020 17:56 by Dr. Tony Malik MD.          Culture:  No results found for: BLOODCX, URINECX, WOUNDCX, MRSACX, RESPCX, STOOLCX      Assessment   1.  End stage renal disease on HD TTS  2.  Type 2 diabetes  3.  Hypertension  4.  Nausea/vomiting/diarrhea  5.  Anemia of CKD    Plan:  1.  Dialysis next due 3/21  2.  Monitor labs      Gomez Lemos, ANTHONY  3/20/2020  10:14

## 2020-03-20 NOTE — DISCHARGE SUMMARY
[facility] Medicine Services  DISCHARGE SUMMARY       Date of Admission: 3/18/2020  Date of Discharge:  3/20/2020  Primary Care Physician: Bharati Dahl APRN    Presenting Problem/History of Present Illness:  Nausea, vomiting, diarrhea     Final Discharge Diagnoses:  Active Hospital Problems    Diagnosis   • Nausea vomiting and diarrhea   • Non-compliance with renal dialysis (CMS/[facility])   • Type 2 diabetes mellitus, with long-term current use of insulin (CMS/[facility])   • End stage renal failure on dialysis (CMS/[facility])   • Glaucoma     Consults:   1.  Dr. Ten Boyd-nephrology    Procedures Performed: None    Pertinent Test Results:   Lab Results (all)     Procedure Component Value Units Date/Time    POC Glucose Once [[id number]]  (Abnormal) Collected:  03/20/20 0752    Specimen:  Blood Updated:  03/20/20 0822     Glucose 168 mg/dL      Comment: : [id number] Colton Blancotalia ID: [id number]       Basic Metabolic Panel [[id number]]  (Abnormal) Collected:  03/20/20 0403    Specimen:  Blood Updated:  03/20/20 0537     Glucose 214 mg/dL      BUN 17 mg/dL      Creatinine 5.03 mg/dL      Sodium 134 mmol/L      Potassium 4.3 mmol/L      Chloride 90 mmol/L      CO2 27.0 mmol/L      Calcium 8.9 mg/dL      eGFR   Amer --     Comment: <15 Indicative of kidney failure.        eGFR Non African Amer 9 mL/min/1.73      Comment: <15 Indicative of kidney failure.        BUN/Creatinine Ratio 3.4     Anion Gap 17.0 mmol/L     CBC (No Diff) [[id number]]  (Abnormal) Collected:  03/20/20 0403    Specimen:  Blood Updated:  03/20/20 0457     WBC 6.02 10*3/mm3      RBC 3.42 10*6/mm3      Hemoglobin 10.0 g/dL      Hematocrit 31.6 %      MCV 92.4 fL      MCH 29.2 pg      MCHC 31.6 g/dL      RDW 15.5 %      RDW-SD 52.9 fl      MPV 9.2 fL      Platelets 343 10*3/mm3     Gastrointestinal Panel, PCR - Stool, Per Rectum [[id number]]  (Normal) Collected:  03/19/20 1020    Specimen:  Stool from Per Rectum  Updated:  03/19/20 2014     Campylobacter Not Detected     Plesiomonas shigelloides Not Detected     Salmonella Not Detected     Vibrio Not Detected     Vibrio cholerae Not Detected     Yersinia enterocolitica Not Detected     Enteroaggregative E. coli (EAEC) Not Detected     Enteropathogenic E. coli (EPEC) Not Detected     Enterotoxigenic E. coli (ETEC) lt/st Not Detected     Shiga-like toxin-producing E. coli (STEC) stx1/stx2 Not Detected     E. coli O157 Not Detected     Shigella/Enteroinvasive E. coli (EIEC) Not Detected     Cryptosporidium Not Detected     Cyclospora cayetanensis Not Detected     Entamoeba histolytica Not Detected     Giardia lamblia Not Detected     Adenovirus F40/41 Not Detected     Astrovirus Not Detected     Norovirus GI/GII Not Detected     Rotavirus A Not Detected     Sapovirus (I, II, IV or V) Not Detected    Narrative:       If Aeromonas, Staphylococcus aureus or Bacillus cereus are suspected, please order [id number]: Stool Culture, Aeromonas, S aureus, B Cereus.    POC Glucose Once [[id number]]  (Abnormal) Collected:  03/19/20 1939    Specimen:  Blood Updated:  03/19/20 1956     Glucose 132 mg/dL      Comment: : [id number] Taisha Delaney ID: [id number]       POC Glucose Once [[id number]]  (Abnormal) Collected:  03/19/20 1644    Specimen:  Blood Updated:  03/19/20 1657     Glucose 205 mg/dL      Comment: : [id number] Johnnie Hines ID: [id number]       Hepatitis B Surface Antigen [[id number]]  (Normal) Collected:  03/19/20 1332    Specimen:  Blood Updated:  03/19/20 1416     Hepatitis B Surface Ag Non-Reactive    Narrative:       Results may be falsely decreased if patient taking Biotin.      Hep B Confirmation Tube [[id number]] Collected:  03/19/20 1332    Specimen:  Blood Updated:  03/19/20 1344     Extra Tube --    POC Glucose Once [[id number]]  (Normal) Collected:  03/19/20 1250    Specimen:  Blood Updated:  03/19/20 1302     Glucose 96 mg/dL      Comment: : [id number]  Hossein KristanMeter ID: [id number]       POC Glucose Once [[id number]]  (Abnormal) Collected:  03/19/20 0737    Specimen:  Blood Updated:  03/19/20 0756     Glucose 246 mg/dL      Comment: : [id number] Johnnie LisaMeter ID: [id number]       Hemoglobin A1c [[id number]]  (Abnormal) Collected:  03/19/20 0507    Specimen:  Blood Updated:  03/19/20 0604     Hemoglobin A1C 6.80 %     Basic Metabolic Panel [[id number]]  (Abnormal) Collected:  03/19/20 0507    Specimen:  Blood Updated:  03/19/20 0550     Glucose 176 mg/dL      BUN 40 mg/dL      Creatinine 9.26 mg/dL      Sodium 137 mmol/L      Potassium 4.4 mmol/L      Chloride 92 mmol/L      CO2 28.0 mmol/L      Calcium 9.0 mg/dL      eGFR   Amer --     Comment: <15 Indicative of kidney failure.        eGFR Non African Amer 4 mL/min/1.73      Comment: <15 Indicative of kidney failure.        BUN/Creatinine Ratio 4.3     Anion Gap 17.0 mmol/L     Lipid Panel [[id number]]  (Abnormal) Collected:  03/19/20 0507    Specimen:  Blood Updated:  03/19/20 0549     Total Cholesterol 85 mg/dL      Triglycerides 128 mg/dL      HDL Cholesterol 39 mg/dL      LDL Cholesterol  20 mg/dL      VLDL Cholesterol 25.6 mg/dL      LDL/HDL Ratio 0.52    Narrative:       TSH [[id number]]  (Normal) Collected:  03/19/20 0507    Specimen:  Blood Updated:  03/19/20 0549     TSH 3.140 uIU/mL     CBC (No Diff) [[id number]]  (Abnormal) Collected:  03/19/20 0507    Specimen:  Blood Updated:  03/19/20 0523     WBC 6.44 10*3/mm3      RBC 3.36 10*6/mm3      Hemoglobin 9.8 g/dL      Hematocrit 31.6 %      MCV 94.0 fL      MCH 29.2 pg      MCHC 31.0 g/dL      RDW 15.5 %      RDW-SD 53.6 fl      MPV 9.0 fL      Platelets 305 10*3/mm3     POC Glucose Once [[id number]]  (Normal) Collected:  03/19/20 0234    Specimen:  Blood Updated:  03/19/20 0246     Glucose 91 mg/dL      Comment: : [id number] Steven Villeda ID: [id number]       POC Glucose Once [[id number]]  (Abnormal) Collected:  03/19/20 0212     Specimen:  Blood Updated:  03/19/20 0233     Glucose 69 mg/dL      Comment: : [id number] XtimenahMeter ID: [id number]       POC Glucose Once [[id number]]  (Abnormal) Collected:  03/19/20 0148    Specimen:  Blood Updated:  03/19/20 0209     Glucose 52 mg/dL      Comment: : [id number] XtimenahMeter ID: [id number]       POC Glucose Once [[id number]]  (Abnormal) Collected:  03/18/20 2237    Specimen:  Blood Updated:  03/18/20 2250     Glucose 154 mg/dL      Comment: : [id number] XtimenahMeter ID: [id number]       Lactic Acid, Plasma [[id number]]  (Normal) Collected:  03/18/20 1820    Specimen:  Blood Updated:  03/18/20 1850     Lactate 1.3 mmol/L     Urinalysis With Microscopic If Indicated (No Culture) - Urine, Clean Catch [[id number]]  (Abnormal) Collected:  03/18/20 1633    Specimen:  Urine, Clean Catch Updated:  03/18/20 1751     Color, UA Yellow     Appearance, UA Clear     pH, UA 8.5     Specific Gravity, UA 1.012     Glucose,  mg/dL (Trace)     Ketones, UA Negative     Bilirubin, UA Negative     Blood, UA Trace     Protein, UA >=300 mg/dL (3+)     Leuk Esterase, UA Trace     Nitrite, UA Negative     Urobilinogen, UA 0.2 E.U./dL    Urinalysis, Microscopic Only - Urine, Clean Catch [[id number]]  (Abnormal) Collected:  03/18/20 1633    Specimen:  Urine, Clean Catch Updated:  03/18/20 1751     RBC, UA 0-2 /HPF      WBC, UA 0-2 /HPF      Bacteria, UA None Seen /HPF      Squamous Epithelial Cells, UA 0-2 /HPF      Hyaline Casts, UA None Seen /LPF      Methodology Manual Light Microscopy    Comprehensive Metabolic Panel [[id number]]  (Abnormal) Collected:  03/18/20 1633    Specimen:  Blood Updated:  03/18/20 1714     Glucose 135 mg/dL      BUN 38 mg/dL      Creatinine 8.28 mg/dL      Sodium 136 mmol/L      Potassium 5.2 mmol/L      Chloride 87 mmol/L      CO2 28.0 mmol/L      Calcium 9.8 mg/dL      Total Protein 8.0 g/dL      Albumin 4.20 g/dL      ALT (SGPT) 7 U/L      AST (SGOT) 12 U/L       Alkaline Phosphatase 323 U/L      Total Bilirubin 0.3 mg/dL      eGFR Non African Amer 5 mL/min/1.73      Comment: <15 Indicative of kidney failure.        eGFR   Amer --     Comment: <15 Indicative of kidney failure.        Globulin 3.8 gm/dL      A/G Ratio 1.1 g/dL      BUN/Creatinine Ratio 4.6    Lipase [[id number]]  (Normal) Collected:  03/18/20 1633    Specimen:  Blood Updated:  03/18/20 1708     Lipase 31 U/L     CBC Auto Differential [[id number]]  (Abnormal) Collected:  03/18/20 1633    Specimen:  Blood Updated:  03/18/20 1705     WBC 8.98 10*3/mm3      RBC 3.80 10*6/mm3      Hemoglobin 10.9 g/dL      Hematocrit 35.1 %      MCV 92.4 fL      MCH 28.7 pg      MCHC 31.1 g/dL      RDW 15.7 %      RDW-SD 53.2 fl      MPV 9.0 fL      Platelets 381 10*3/mm3      Neutrophil % 74.0 %      Lymphocyte % 15.9 %      Monocyte % 5.5 %      Eosinophil % 4.0 %      Basophil % 0.3 %      Immature Grans % 0.3 %      Neutrophils, Absolute 6.64 10*3/mm3      Lymphocytes, Absolute 1.43 10*3/mm3      Monocytes, Absolute 0.49 10*3/mm3      Eosinophils, Absolute 0.36 10*3/mm3      Basophils, Absolute 0.03 10*3/mm3      Immature Grans, Absolute 0.03 10*3/mm3      nRBC 0.0 /100 WBC         Imaging Results (All)     Procedure Component Value Units Date/Time    CT Abdomen Pelvis Without Contrast [[id number]] Collected:  03/18/20 1752     Updated:  03/18/20 1759    Narrative:       EXAMINATION:   CT ABDOMEN PELVIS WO CONTRAST-  3/18/2020 5:52 PM CDT     HISTORY: CT ABDOMEN PELVIS WO CONTRAST- 3/18/2020 5:41 PM CDT     HISTORY: Abdominal pain, unspecified; R11.2-Nausea with vomiting,  unspecified; R19.7-Diarrhea, unspecified      COMPARISON: None      DOSE LENGTH PRODUCT: 285 mGy cm. Automated exposure control was also  utilized to decrease patient radiation dose.     TECHNIQUE: Noncontrast enhanced images of the abdomen and pelvis  obtained without oral contrast. Multiplanar reformatted images were  provided for review.         FINDINGS:   Right posterior pleural effusion is present. Smaller left pleural  effusion is noted. Trace pericardial effusion is present..      LIVER: No focal liver lesion.      BILIARY SYSTEM: The gallbladder is surgically absent.      PANCREAS: No focal pancreatic lesion.      SPLEEN: Unremarkable.      KIDNEYS AND ADRENALS: Bilateral kidneys and adrenal glands are  unremarkable.  Vascular calcifications are noted in the renal arteries..     RETROPERITONEUM: No mass, lymphadenopathy or hemorrhage.      GI TRACT: No evidence of obstruction or bowel wall thickening. The  appendix is visualized. Diverticulosis is present..     OTHER: There is no mesenteric mass, lymphadenopathy or fluid collection.  The osseous structures and soft tissues demonstrate no worrisome  lesions. Extensive vascular calcifications present abdominal aorta and  iliac arteries.      PELVIS: No mass lesion, fluid collection or significant lymphadenopathy  is seen in the pelvis. The urinary bladder is normal in appearance.       Impression:       1. Diverticulosis.  2. Extensive atherosclerotic vascular calcification and plaque is noted  in the abdominal aorta and iliac arteries.  3. Moderate right posterior pleural effusion, smaller left pleural  effusion and small pericardial effusion.         This report was finalized on 03/18/2020 17:56 by Dr. Tony Malik MD.        History of Present Illness on Day of Discharge: Patient states she is still having some diarrhea, however states the consistency is more firm today.    Hospital Course:  Ms. Gentile is a 60-year-old  female who follows ANTHONY Kiser for primary care.  She has a medical history significant for end-stage renal disease on hemodialysis, diabetes mellitus, glaucoma, hypertension, hypothyroidism, and medical noncompliance.  She presented to the [facility] emergency department on 3/18/2020 with complaints of nausea, vomiting, and diarrhea.  She was so ill  "on Tuesday that dialysis clinic refused to complete her treatment at that time.  In the emergency department, creatinine level was 8.28.  Other laboratory work-up was essentially unrevealing.  There were no significant findings noted on CT of the abdomen and pelvis.  The patient was admitted to the hospitalist service for further evaluation and management.    The patient was seen in consultation by nephrology.  She did receive a dialysis treatment on Thursday as per her regular schedule and tolerated this without incident.  She is next due for dialysis tomorrow.  She was given a clear liquid diet on admission, and this has been advanced to a GI soft/bland diet.  She is tolerating this without nausea or vomiting.  A GI panel was collected, which was negative.  Would lean toward some type of viral gastroenteritis as the cause of her diarrhea at this point in time.  She does state that her stool has become more formed as of this morning.  I have advised her to take an over-the-counter probiotic or eat yogurt for at least the next week to help restore normal gut jacquie.    Overall, the patient is hemodynamically stable and appropriate for discharge home today.  She will follow-up with her primary care provider if needed in the next 1 to 2 weeks.  She will need to continue dialysis as per her regular schedule.    Condition on Discharge: Medically stable    Physical Exam on Discharge:  /60 (BP Location: Right arm, Patient Position: Lying)   Pulse 85   Temp 98.5 °F (36.9 °C) (Oral)   Resp 16   Ht 149.9 cm (59\")   Wt 61.4 kg (135 lb 7 oz)   SpO2 94%   BMI 27.36 kg/m²   Physical Exam  Constitutional: She is oriented to person, place, and time. She appears well-developed and well-nourished. No distress.   Chronically ill-appearing   HENT:   Head: Normocephalic and atraumatic.   Neck: Normal range of motion. Neck supple. No JVD present. No tracheal deviation present.   Cardiovascular: Normal rate, regular rhythm " and intact distal pulses. Exam reveals no gallop and no friction rub.   Murmur heard.  Sinus 77-81   Pulmonary/Chest: Effort normal and breath sounds normal. She has no wheezes. She has no rales.   Abdominal: Soft. Bowel sounds are normal. She exhibits distension. There is no tenderness. There is no guarding.   Musculoskeletal: Normal range of motion. She exhibits no edema or tenderness.   Neurological: She is alert and oriented to person, place, and time. No cranial nerve deficit.   Skin: Skin is warm and dry. No rash noted. No erythema.   Psychiatric: She has a normal mood and affect. Her behavior is normal. Judgment and thought content normal.   Vitals reviewed.    Discharge Disposition:  Home or Self Care    Discharge Medications:     Discharge Medications      Continue These Medications      Instructions Start Date   aspirin 81 MG EC tablet   81 mg, Oral, Daily      bismuth subsalicylate 262 MG chewable tablet  Commonly known as:  PEPTO BISMOL   262 mg, Oral, Daily PRN      brimonidine 0.2 % ophthalmic solution  Commonly known as:  ALPHAGAN   1 drop, Left Eye, 3 Times Daily      calcitriol 0.25 MCG capsule  Commonly known as:  ROCALTROL   0.25 mcg, Oral, Take As Directed, Only given on dialysis days (Tuesday, Thursday, Saturday)      carvedilol 12.5 MG tablet  Commonly known as:  COREG   12.5 mg, Oral, 2 Times Daily With Meals      cholecalciferol 25 MCG (1000 UT) tablet  Commonly known as:  VITAMIN D3   2,000 Units, Oral, Daily      dorzolamide 2 % ophthalmic solution  Commonly known as:  TRUSOPT   1 drop, Left Eye, 2 Times Daily      famotidine 20 MG tablet  Commonly known as:  PEPCID   20 mg, Oral, 2 Times Daily      fluticasone 50 MCG/ACT nasal spray  Commonly known as:  FLONASE   2 sprays, Nasal, 2 Times Daily      homatropine 5 % ophthalmic solution   1 drop, Left Eye, Daily      insulin glargine 100 UNIT/ML injection  Commonly known as:  LANTUS   10 Units, Subcutaneous, Nightly      levothyroxine 25 MCG  tablet  Commonly known as:  SYNTHROID, LEVOTHROID   25 mcg, Oral, Daily      losartan 50 MG tablet  Commonly known as:  COZAAR   50 mg, Oral, 2 Times Daily      pravastatin 10 MG tablet  Commonly known as:  PRAVACHOL   10 mg, Oral, Nightly      Rhopressa 0.02 % solution  Generic drug:  Netarsudil Dimesylate   1 drop, Left Eye, Nightly      sertraline 25 MG tablet  Commonly known as:  ZOLOFT   25 mg, Oral, Daily      sevelamer 800 MG tablet  Commonly known as:  RENAGEL   2,400 mg, Oral, 3 Times Daily With Meals      timolol 0.5 % ophthalmic solution  Commonly known as:  TIMOPTIC   1 drop, Left Eye, Every Morning      Ventolin  (90 Base) MCG/ACT inhaler  Generic drug:  albuterol sulfate HFA   2 puffs, Inhalation, Every 4 Hours PRN      Vyzulta 0.024 % solution  Generic drug:  Latanoprostene Bunod   1 drop, Ophthalmic, Nightly, Left eye            Discharge Diet:   Diet Instructions     Diet: Renal, Consistent Carbohydrate      Discharge Diet:   Renal  Consistent Carbohydrate       GI soft/bland        Activity at Discharge:   Activity Instructions     Activity as Tolerated          Discharge Care Plan/Instructions:   1.  Return for any acute or worsening symptoms  2.  Advised patient to take an over-the-counter probiotic or eat yogurt to restore normal gut jacquie    Follow-up Appointments:   1.  Primary care provider within 1 to 2 weeks if needed, considering current COVID-19 regulations    Test Results Pending at Discharge: None    ANTHONY George  03/20/20  10:32    Time: 25 minutes    I personally evaluated and examined the patient in conjunction with ANTHONY Siegel and agree with the assessment, treatment plan, and disposition of the patient as recorded by her. My history, exam, and further recommendations are: I have reviewed and agree with the plans. Flory Lockett MD  03/20/20  18:13

## 2020-03-20 NOTE — PLAN OF CARE
Problem: Patient Care Overview  Goal: Plan of Care Review  Outcome: Ongoing (interventions implemented as appropriate)  Flowsheets (Taken 3/20/2020 0518)  Progress: improving  Plan of Care Reviewed With: patient  Outcome Summary: VSS Afebrile. No c/o voiced. Sleeping between care. Pt reports she is tolerating advance in diet. Still having liquid stools Florastor started last night. S 77-80 per tele.

## 2020-03-21 ENCOUNTER — READMISSION MANAGEMENT (OUTPATIENT)
Dept: CALL CENTER | Facility: HOSPITAL | Age: 60
End: 2020-03-21

## 2020-03-21 NOTE — OUTREACH NOTE
Prep Survey      Responses   [facility] patient discharged from?  [place]   Is LACE score < 7 ?  No   Eligibility  Readm Mgmt   Discharge diagnosis  N/V/D, non-compliance with renal dialysis, IDDM II, ESRF on DIalysis, Glaucoma   Does the patient have one of the following disease processes/diagnoses(primary or secondary)?  Other   Does the patient have Home health ordered?  No   Is there a DME ordered?  No   Comments regarding appointments  Pt to schedule f/u with PCP   General alerts for this patient  HD Tu/Th/Sat   Prep survey completed?  Yes          Soledad Schwartz RN

## 2020-03-24 ENCOUNTER — TELEPHONE (OUTPATIENT)
Dept: INTERNAL MEDICINE | Age: 60
End: 2020-03-24

## 2020-03-26 ENCOUNTER — READMISSION MANAGEMENT (OUTPATIENT)
Dept: CALL CENTER | Facility: HOSPITAL | Age: 60
End: 2020-03-26

## 2020-03-26 NOTE — OUTREACH NOTE
Medical Week 1 Survey      Responses   [facility] patient discharged from?  [place]   Does the patient have one of the following disease processes/diagnoses(primary or secondary)?  Other   Is there a successful TCM telephone encounter documented?  No   Week 1 attempt successful?  Yes   Call start time  1334   Rescheduled  Rescheduled-pt requested   Call end time  1334          Xena Sol RN

## 2020-03-27 ENCOUNTER — VIRTUAL VISIT (OUTPATIENT)
Dept: PRIMARY CARE CLINIC | Age: 60
End: 2020-03-27
Payer: MEDICARE

## 2020-03-27 PROCEDURE — G2012 BRIEF CHECK IN BY MD/QHP: HCPCS | Performed by: NURSE PRACTITIONER

## 2020-03-27 ASSESSMENT — ENCOUNTER SYMPTOMS
NAUSEA: 0
GASTROINTESTINAL NEGATIVE: 1
DIARRHEA: 0
VOMITING: 0
ABDOMINAL PAIN: 0
RESPIRATORY NEGATIVE: 1
EYES NEGATIVE: 1

## 2020-03-27 NOTE — PROGRESS NOTES
[address] [address]            Phone:  [phone]  Fax:  [phone]    Maria Isabel Song is a 56 y.o. female evaluated via telephone on 3/27/2020. Consent:    She and/or health care decision maker is aware that that she may receive a bill for this telephone service, depending on her insurance coverage, and has provided verbal consent to proceed: Yes      HPI  Chief Complaint   Patient presents with   [address] from Hospital       I communicated with the patient and/or health care decision maker about a visit for a hospital follow-up. Patient was admitted on 3/18/2020 and discharged on 3/20/2020. She was admitted for nausea vomiting and diarrhea with noncompliance on renal dialysis. Patient did have nephrology consulted onto the case while she was in the hospital.  Patient was supposed to have dialysis treatment on Tuesday but refused due to her sickness when she was admitted to the hospital she did receive dialysis treatment on a Thursday as per her regular schedule she did tolerate it well without any issues. She will was diagnosed with some type of viral gastroenteritis causing the diarrhea. She denies any other episodes of N/V/D. Review of Systems   Constitutional: Negative. HENT: Negative. Eyes: Negative. Respiratory: Negative. Cardiovascular: Negative. Gastrointestinal: Negative. Negative for abdominal pain, diarrhea, nausea and vomiting. Endocrine: Negative. Genitourinary: Negative. Musculoskeletal: Negative. Skin: Negative. Neurological: Negative. Hematological: Negative. Psychiatric/Behavioral: Negative. PLAN    Details of this discussion including any medical advice provided:     1. Hospital discharge follow-up      2. CKD (chronic kidney disease) stage V requiring chronic dialysis [facility])    Patient reports doing well since hospital discharge. She denies any nausea vomiting or diarrhea at this time.

## 2020-03-31 ENCOUNTER — READMISSION MANAGEMENT (OUTPATIENT)
Dept: CALL CENTER | Facility: HOSPITAL | Age: 60
End: 2020-03-31

## 2020-03-31 NOTE — OUTREACH NOTE
Medical Week 1 Survey      Responses   [facility] patient discharged from?  [place]   Does the patient have one of the following disease processes/diagnoses(primary or secondary)?  Other   Is there a successful TCM telephone encounter documented?  No   Week 1 attempt successful?  No   Rescheduled  Revoked          Delores Farias RN

## 2020-04-09 ENCOUNTER — NURSE TRIAGE (OUTPATIENT)
Dept: CALL CENTER | Facility: HOSPITAL | Age: 60
End: 2020-04-09

## 2020-04-09 ENCOUNTER — HOSPITAL ENCOUNTER (EMERGENCY)
Facility: HOSPITAL | Age: 60
Discharge: HOME | End: 2020-04-09
Attending: FAMILY MEDICINE | Admitting: FAMILY MEDICINE

## 2020-04-09 ENCOUNTER — APPOINTMENT (OUTPATIENT)
Dept: GENERAL RADIOLOGY | Facility: HOSPITAL | Age: 60
End: 2020-04-09

## 2020-04-09 VITALS
HEART RATE: 84 BPM | TEMPERATURE: 98 F | OXYGEN SATURATION: 92 % | WEIGHT: 140 LBS | SYSTOLIC BLOOD PRESSURE: 165 MMHG | RESPIRATION RATE: 25 BRPM | BODY MASS INDEX: 28.22 KG/M2 | DIASTOLIC BLOOD PRESSURE: 77 MMHG | HEIGHT: 59 IN

## 2020-04-09 DIAGNOSIS — E87.70 HYPERVOLEMIA, UNSPECIFIED HYPERVOLEMIA TYPE: Primary | ICD-10-CM

## 2020-04-09 DIAGNOSIS — N19 UREMIA: ICD-10-CM

## 2020-04-09 LAB
ALBUMIN SERPL-MCNC: 3.9 G/DL (ref 3.5–5.2)
ALBUMIN/GLOB SERPL: 1.2 G/DL
ALP SERPL-CCNC: 190 U/L (ref 39–117)
ALT SERPL W P-5'-P-CCNC: <5 U/L (ref 1–33)
ANION GAP SERPL CALCULATED.3IONS-SCNC: 15 MMOL/L (ref 5–15)
AST SERPL-CCNC: 9 U/L (ref 1–32)
BASOPHILS # BLD AUTO: 0.02 10*3/MM3 (ref 0–0.2)
BASOPHILS NFR BLD AUTO: 0.2 % (ref 0–1.5)
BILIRUB SERPL-MCNC: 0.4 MG/DL (ref 0.2–1.2)
BUN BLD-MCNC: 59 MG/DL (ref 8–23)
BUN/CREAT SERPL: 6.9 (ref 7–25)
CALCIUM SPEC-SCNC: 9.6 MG/DL (ref 8.6–10.5)
CHLORIDE SERPL-SCNC: 89 MMOL/L (ref 98–107)
CO2 SERPL-SCNC: 27 MMOL/L (ref 22–29)
CREAT BLD-MCNC: 8.61 MG/DL (ref 0.57–1)
DEPRECATED RDW RBC AUTO: 52.7 FL (ref 37–54)
EOSINOPHIL # BLD AUTO: 0.27 10*3/MM3 (ref 0–0.4)
EOSINOPHIL NFR BLD AUTO: 3 % (ref 0.3–6.2)
ERYTHROCYTE [DISTWIDTH] IN BLOOD BY AUTOMATED COUNT: 16.7 % (ref 12.3–15.4)
GFR SERPL CREATININE-BSD FRML MDRD: 5 ML/MIN/1.73
GFR SERPL CREATININE-BSD FRML MDRD: ABNORMAL ML/MIN/{1.73_M2}
GLOBULIN UR ELPH-MCNC: 3.2 GM/DL
GLUCOSE BLD-MCNC: 102 MG/DL (ref 65–99)
HCT VFR BLD AUTO: 26.5 % (ref 34–46.6)
HGB BLD-MCNC: 8.3 G/DL (ref 12–15.9)
HOLD SPECIMEN: NORMAL
HOLD SPECIMEN: NORMAL
IMM GRANULOCYTES # BLD AUTO: 0.06 10*3/MM3 (ref 0–0.05)
IMM GRANULOCYTES NFR BLD AUTO: 0.7 % (ref 0–0.5)
LYMPHOCYTES # BLD AUTO: 1.07 10*3/MM3 (ref 0.7–3.1)
LYMPHOCYTES NFR BLD AUTO: 11.7 % (ref 19.6–45.3)
MCH RBC QN AUTO: 27.2 PG (ref 26.6–33)
MCHC RBC AUTO-ENTMCNC: 31.3 G/DL (ref 31.5–35.7)
MCV RBC AUTO: 86.9 FL (ref 79–97)
MONOCYTES # BLD AUTO: 0.62 10*3/MM3 (ref 0.1–0.9)
MONOCYTES NFR BLD AUTO: 6.8 % (ref 5–12)
NEUTROPHILS # BLD AUTO: 7.08 10*3/MM3 (ref 1.7–7)
NEUTROPHILS NFR BLD AUTO: 77.6 % (ref 42.7–76)
NRBC BLD AUTO-RTO: 0.2 /100 WBC (ref 0–0.2)
NT-PROBNP SERPL-MCNC: ABNORMAL PG/ML (ref 5–900)
PLATELET # BLD AUTO: 405 10*3/MM3 (ref 140–450)
PMV BLD AUTO: 8.6 FL (ref 6–12)
POTASSIUM BLD-SCNC: 4.2 MMOL/L (ref 3.5–5.2)
PROT SERPL-MCNC: 7.1 G/DL (ref 6–8.5)
RBC # BLD AUTO: 3.05 10*6/MM3 (ref 3.77–5.28)
SODIUM BLD-SCNC: 131 MMOL/L (ref 136–145)
TROPONIN T SERPL-MCNC: 0.04 NG/ML (ref 0–0.03)
WBC NRBC COR # BLD: 9.12 10*3/MM3 (ref 3.4–10.8)
WHOLE BLOOD HOLD SPECIMEN: NORMAL
WHOLE BLOOD HOLD SPECIMEN: NORMAL

## 2020-04-09 PROCEDURE — 71045 X-RAY EXAM CHEST 1 VIEW: CPT

## 2020-04-09 PROCEDURE — 93010 ELECTROCARDIOGRAM REPORT: CPT | Performed by: INTERNAL MEDICINE

## 2020-04-09 PROCEDURE — 84484 ASSAY OF TROPONIN QUANT: CPT | Performed by: FAMILY MEDICINE

## 2020-04-09 PROCEDURE — 80053 COMPREHEN METABOLIC PANEL: CPT | Performed by: FAMILY MEDICINE

## 2020-04-09 PROCEDURE — 83880 ASSAY OF NATRIURETIC PEPTIDE: CPT | Performed by: FAMILY MEDICINE

## 2020-04-09 PROCEDURE — 85025 COMPLETE CBC W/AUTO DIFF WBC: CPT | Performed by: FAMILY MEDICINE

## 2020-04-09 PROCEDURE — 93005 ELECTROCARDIOGRAM TRACING: CPT | Performed by: FAMILY MEDICINE

## 2020-04-09 PROCEDURE — 99284 EMERGENCY DEPT VISIT MOD MDM: CPT

## 2020-04-09 RX ORDER — SODIUM CHLORIDE 0.9 % (FLUSH) 0.9 %
10 SYRINGE (ML) INJECTION AS NEEDED
Status: DISCONTINUED | OUTPATIENT
Start: 2020-04-09 | End: 2020-04-09 | Stop reason: HOSPADM

## 2020-04-09 NOTE — ED PROVIDER NOTES
Subjective   This patient is a 60-year-old female with end-stage renal disease who was last dialyzed 5 days ago.  She missed her dialysis appointment 2 days ago she said because she was not feeling well.  He presents today complaining of shortness of breath, lower extremity swelling and chest pain which is been going on for the past 5 days.  She states the chest pain is worse with a deep breath.          Review of Systems   Constitutional: Positive for fatigue.   Respiratory: Positive for shortness of breath.    Cardiovascular: Positive for chest pain and leg swelling.   All other systems reviewed and are negative.      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    • Chronic otitis media    • Diabetes (CMS/[facility])    • End stage renal disease on dialysis (CMS/[facility])    • Eustachian tube dysfunction    • Glaucoma    • HTN (hypertension)    • Mucoid otitis media    • Noncompliance of patient with renal dialysis (CMS/[facility])    • Tobacco abuse        Allergies   Allergen Reactions   • Bupropion Nausea Only   • Chantix [Varenicline]    • Sulfa Antibiotics    • Azithromycin Rash   • Levofloxacin Rash   • Penicillins Rash       Past Surgical History:   Procedure Laterality Date   • ARTERIOVENOUS FISTULA     • CATARACT EXTRACTION WITH INTRAOCULAR LENS IMPLANT     •  SECTION     • CHOLECYSTECTOMY     • MYRINGOTOMY W/ TUBES Bilateral    • MYRINGOTOMY W/ TUBES Right    • TUBAL ABDOMINAL LIGATION         Family History   Problem Relation Age of Onset   • Heart disease Mother    • Diabetes Father        Social History     Socioeconomic History   • Marital status: Single     Spouse name: Not on file   • Number of children: Not on file   • Years of education: Not on file   • Highest education level: Not on file   Tobacco Use   • Smoking status: Current Every Day Smoker     Packs/day: 2.00     Years: 6.00     Pack years: 12.00     Types: Cigarettes   • Smokeless tobacco: Never Used    Substance and Sexual Activity   • Alcohol use: No   • Drug use: No   • Sexual activity: Defer           Objective   Physical Exam   Constitutional: She is oriented to person, place, and time. She appears well-developed and well-nourished.   HENT:   Head: Normocephalic and atraumatic.   Mouth/Throat: Oropharynx is clear and moist.   Eyes: Conjunctivae and EOM are normal.   Neck: Normal range of motion. Neck supple.   Cardiovascular: Normal rate, regular rhythm, normal heart sounds and intact distal pulses.   Pulmonary/Chest: Tachypnea noted. She has rales in the right lower field and the left lower field.   Abdominal: Soft. Bowel sounds are normal.   Musculoskeletal: Normal range of motion.   Neurological: She is alert and oriented to person, place, and time.   Skin: Skin is warm and dry. Capillary refill takes less than 2 seconds.   Psychiatric: She has a normal mood and affect. Her behavior is normal. Judgment and thought content normal.   Nursing note and vitals reviewed.      Procedures           ED Course                                           MDM  Number of Diagnoses or Management Options     Amount and/or Complexity of Data Reviewed  Clinical lab tests: reviewed and ordered  Tests in the radiology section of CPT®: ordered and reviewed  Tests in the medicine section of CPT®: reviewed and ordered  Decide to obtain previous medical records or to obtain history from someone other than the patient: yes        Final diagnoses:   Hypervolemia, unspecified hypervolemia type   Uremia     Discussed the patient's chest pain with Dr. Romo of cardiology who reassured me that a further cardiac work-up is not warranted.  He believes that chest discomfort is from the need for dialysis.  The patient's initial troponin was barely elevated and is impossible to interpret in the face of a creatinine of over 8.    I discussed the case with Dr. Gonzalez who kindly agreed to arrange for outpatient dialysis today.    The  patient will be discharged directly from the ED to go to dialysis.       Benitez Farrell MD  04/09/20 2522

## 2020-04-09 NOTE — TELEPHONE ENCOUNTER
"Caller states she has a headache, fast Heart rate, chest hurts, nausea, also her Dialysis cath site hurts and is going up her left arm also states she missed her Dialysis Tuesday with the last Dialysis being Saturday.      Reason for Disposition  • SEVERE chest pain    Additional Information  • Negative: Severe difficulty breathing (e.g., struggling for each breath, speaks in single words)  • Negative: Difficult to awaken or acting confused (e.g., disoriented, slurred speech)  • Negative: Shock suspected (e.g., cold/pale/clammy skin, too weak to stand, low BP, rapid pulse)  • Negative: [1] Chest pain lasts > 5 minutes AND [2] history of heart disease  (i.e., heart attack, bypass surgery, angina, angioplasty, CHF; not just a heart murmur)  • Negative: [1] Chest pain lasts > 5 minutes AND [2] described as crushing, pressure-like, or heavy  • Negative: [1] Chest pain lasts > 5 minutes AND [2] age > 50  • Negative: [1] Chest pain lasts > 5 minutes AND [2] age > 30 AND [3] at least one cardiac risk factor (i.e., hypertension, diabetes, obesity, smoker or strong family history of heart disease)  • Negative: [1] Chest pain lasts > 5 minutes AND [2] not relieved with nitroglycerin  • Negative: Passed out (i.e., lost consciousness, collapsed and was not responding)  • Negative: Heart beating < 50 beats per minute OR > 140 beats per minute  • Negative: Visible sweat on face or sweat dripping down face  • Negative: Sounds like a life-threatening emergency to the triager  • Negative: Followed a chest injury    Answer Assessment - Initial Assessment Questions  1. LOCATION: \"Where does it hurt?\"        Left Chest  2. RADIATION: \"Does the pain go anywhere else?\" (e.g., into neck, jaw, arms, back)      Left hand and arm numbness  3. ONSET: \"When did the chest pain begin?\" (Minutes, hours or days)       Sunday  4. PATTERN \"Does the pain come and go, or has it been constant since it started?\"  \"Does it get worse with exertion?\"       " "Getting worse  5. DURATION: \"How long does it last\" (e.g., seconds, minutes, hours)      With every breath  6. SEVERITY: \"How bad is the pain?\"  (e.g., Scale 1-10; mild, moderate, or severe)     - MILD (1-3): doesn't interfere with normal activities      - MODERATE (4-7): interferes with normal activities or awakens from sleep     - SEVERE (8-10): excruciating pain, unable to do any normal activities        8  7. CARDIAC RISK FACTORS: \"Do you have any history of heart problems or risk factors for heart disease?\" (e.g., prior heart attack, angina; high blood pressure, diabetes, being overweight, high cholesterol, smoking, or strong family history of heart disease)      Dialysis, Diabetic, Heart Murmur, HTN  8. PULMONARY RISK FACTORS: \"Do you have any history of lung disease?\"  (e.g., blood clots in lung, asthma, emphysema, birth control pills)      No  9. CAUSE: \"What do you think is causing the chest pain?\"      unsure  10. OTHER SYMPTOMS: \"Do you have any other symptoms?\" (e.g., dizziness, nausea, vomiting, sweating, fever, difficulty breathing, cough)        Nausea and bloating  11. PREGNANCY: \"Is there any chance you are pregnant?\" \"When was your last menstrual period?\"        Na    Protocols used: CHEST PAIN-ADULT-AH      "

## 2020-04-09 NOTE — ED NOTES
Pt reports left sided stabbing chest pain since Sunday, worse today. Pt states the pain is made worse by deep breath. Pt states she has had 1 81mg ASA; pt denies nitro or ED drugs.     Grisel Antoine RN  04/09/20 1024       Grisel Antoine RN  04/09/20 1024

## 2020-04-10 ENCOUNTER — TELEPHONE (OUTPATIENT)
Dept: ADMINISTRATIVE | Age: 60
End: 2020-04-10

## 2020-04-14 ENCOUNTER — VIRTUAL VISIT (OUTPATIENT)
Dept: PRIMARY CARE CLINIC | Age: 60
End: 2020-04-14
Payer: MEDICARE

## 2020-04-14 PROCEDURE — 99443 PR PHYS/QHP TELEPHONE EVALUATION 21-30 MIN: CPT | Performed by: NURSE PRACTITIONER

## 2020-04-14 RX ORDER — DICYCLOMINE HYDROCHLORIDE 10 MG/1
10 CAPSULE ORAL 4 TIMES DAILY
Qty: 120 CAPSULE | Refills: 5 | Status: SHIPPED | OUTPATIENT
Start: 2020-04-14

## 2020-04-14 ASSESSMENT — ENCOUNTER SYMPTOMS
EYES NEGATIVE: 1
ABDOMINAL PAIN: 1
RESPIRATORY NEGATIVE: 1
DIARRHEA: 1

## 2020-04-14 NOTE — PROGRESS NOTES
[address] [address]            Phone:  [phone]  Fax:  [phone]    Rea Sands is a 56 y.o. female evaluated via telephone on 4/14/2020. Consent:    She and/or health care decision maker is aware that that she may receive a bill for this telephone service, depending on her insurance coverage, and has provided verbal consent to proceed: Yes      HPI  Chief Complaint   Patient presents with    Diarrhea    Insomnia       I communicated with the patient and/or health care decision maker about increased diarrhea and bloating. She is also complaining of insomnia. She has not tried any otc meds for relief. She has been having the abd cramping and bloating for a few months now. She states that since having her cholecystectomy it has bene intermittent like this. Review of Systems   Constitutional: Negative. HENT: Negative. Eyes: Negative. Respiratory: Negative. Cardiovascular: Negative. Gastrointestinal: Positive for abdominal pain and diarrhea. Endocrine: Negative. Genitourinary: Negative. Musculoskeletal: Negative. Skin: Negative. Neurological: Negative. Hematological: Negative. Psychiatric/Behavioral: Positive for sleep disturbance. PLAN    Details of this discussion including any medical advice provided:     1. Diarrhea, unspecified type      2. Primary insomnia      3. Abdominal cramping    - dicyclomine (BENTYL) 10 MG capsule; Take 1 capsule by mouth 4 times daily  Dispense: 120 capsule; Refill: 5       I am starting patient on Bentyl for abd cramping and diarrhea. She is to start Melatonin otc as well for insomnia. She is to start with 5 mg nightly and increase to 10 mg if needed. I affirm this is a Patient Initiated Episode with an Established Patient who has not had a related appointment within my department in the past 7 days or scheduled within the next 24 hours.       Total Time: minutes: 21-30

## 2020-04-27 ENCOUNTER — HOSPITAL ENCOUNTER (OUTPATIENT)
Facility: HOSPITAL | Age: 60
Setting detail: OBSERVATION
Discharge: HOME | End: 2020-04-29
Attending: EMERGENCY MEDICINE | Admitting: FAMILY MEDICINE

## 2020-04-27 ENCOUNTER — APPOINTMENT (OUTPATIENT)
Dept: GENERAL RADIOLOGY | Facility: HOSPITAL | Age: 60
End: 2020-04-27

## 2020-04-27 DIAGNOSIS — I15.9 SECONDARY HYPERTENSION: ICD-10-CM

## 2020-04-27 DIAGNOSIS — Z91.15 NON-COMPLIANCE WITH RENAL DIALYSIS: ICD-10-CM

## 2020-04-27 DIAGNOSIS — R19.7 NAUSEA VOMITING AND DIARRHEA: ICD-10-CM

## 2020-04-27 DIAGNOSIS — D63.1 ANEMIA DUE TO CHRONIC KIDNEY DISEASE, ON CHRONIC DIALYSIS (HCC): ICD-10-CM

## 2020-04-27 DIAGNOSIS — E87.29 HIGH ANION GAP METABOLIC ACIDOSIS: ICD-10-CM

## 2020-04-27 DIAGNOSIS — E11.22 TYPE 2 DIABETES MELLITUS WITH STAGE 3 CHRONIC KIDNEY DISEASE, WITH LONG-TERM CURRENT USE OF INSULIN (HCC): ICD-10-CM

## 2020-04-27 DIAGNOSIS — Z79.4 TYPE 2 DIABETES MELLITUS WITH STAGE 3 CHRONIC KIDNEY DISEASE, WITH LONG-TERM CURRENT USE OF INSULIN (HCC): ICD-10-CM

## 2020-04-27 DIAGNOSIS — Z99.2 END STAGE RENAL FAILURE ON DIALYSIS (HCC): ICD-10-CM

## 2020-04-27 DIAGNOSIS — R77.8 ELEVATED TROPONIN: ICD-10-CM

## 2020-04-27 DIAGNOSIS — H73.813 ATROPHIC FLACCID TYMPANIC MEMBRANE OF BOTH EARS: ICD-10-CM

## 2020-04-27 DIAGNOSIS — N18.30 TYPE 2 DIABETES MELLITUS WITH STAGE 3 CHRONIC KIDNEY DISEASE, WITH LONG-TERM CURRENT USE OF INSULIN (HCC): ICD-10-CM

## 2020-04-27 DIAGNOSIS — J81.0 ACUTE PULMONARY EDEMA (HCC): Primary | ICD-10-CM

## 2020-04-27 DIAGNOSIS — J15.3: ICD-10-CM

## 2020-04-27 DIAGNOSIS — R06.03 RESPIRATORY DISTRESS: ICD-10-CM

## 2020-04-27 DIAGNOSIS — R59.9 LYMPH NODE ENLARGEMENT: ICD-10-CM

## 2020-04-27 DIAGNOSIS — R11.2 NAUSEA VOMITING AND DIARRHEA: ICD-10-CM

## 2020-04-27 DIAGNOSIS — H65.33 CHRONIC MUCOID OTITIS MEDIA OF BOTH EARS: ICD-10-CM

## 2020-04-27 DIAGNOSIS — E87.1 HYPONATREMIA: ICD-10-CM

## 2020-04-27 DIAGNOSIS — H69.80 DYSFUNCTION OF EUSTACHIAN TUBE, UNSPECIFIED LATERALITY: ICD-10-CM

## 2020-04-27 DIAGNOSIS — H40.9 GLAUCOMA, UNSPECIFIED GLAUCOMA TYPE, UNSPECIFIED LATERALITY: ICD-10-CM

## 2020-04-27 DIAGNOSIS — I50.31 ACUTE DIASTOLIC HEART FAILURE (HCC): ICD-10-CM

## 2020-04-27 DIAGNOSIS — G93.41 ENCEPHALOPATHY, METABOLIC: ICD-10-CM

## 2020-04-27 DIAGNOSIS — Z99.2 ANEMIA DUE TO CHRONIC KIDNEY DISEASE, ON CHRONIC DIALYSIS (HCC): ICD-10-CM

## 2020-04-27 DIAGNOSIS — E13.69 OTHER SPECIFIED DIABETES MELLITUS WITH OTHER SPECIFIED COMPLICATION, UNSPECIFIED WHETHER LONG TERM INSULIN USE (HCC): ICD-10-CM

## 2020-04-27 DIAGNOSIS — E13.39 DIABETES MELLITUS OF OTHER TYPE WITH OTHER OPHTHALMIC COMPLICATION, UNSPECIFIED WHETHER LONG TERM INSULIN USE (HCC): ICD-10-CM

## 2020-04-27 DIAGNOSIS — N18.6 END STAGE RENAL FAILURE ON DIALYSIS (HCC): ICD-10-CM

## 2020-04-27 DIAGNOSIS — Z91.199 MEDICAL NON-COMPLIANCE: ICD-10-CM

## 2020-04-27 DIAGNOSIS — Z72.0 TOBACCO ABUSE: Chronic | ICD-10-CM

## 2020-04-27 DIAGNOSIS — E87.5 HYPERKALEMIA: ICD-10-CM

## 2020-04-27 DIAGNOSIS — N18.6 ANEMIA DUE TO CHRONIC KIDNEY DISEASE, ON CHRONIC DIALYSIS (HCC): ICD-10-CM

## 2020-04-27 LAB
ALBUMIN SERPL-MCNC: 3.9 G/DL (ref 3.5–5.2)
ALBUMIN/GLOB SERPL: 1.3 G/DL
ALP SERPL-CCNC: 376 U/L (ref 39–117)
ALT SERPL W P-5'-P-CCNC: 28 U/L (ref 1–33)
ANION GAP SERPL CALCULATED.3IONS-SCNC: 18 MMOL/L (ref 5–15)
AST SERPL-CCNC: 28 U/L (ref 1–32)
BASOPHILS # BLD AUTO: 0.04 10*3/MM3 (ref 0–0.2)
BASOPHILS NFR BLD AUTO: 0.5 % (ref 0–1.5)
BILIRUB SERPL-MCNC: 0.3 MG/DL (ref 0.2–1.2)
BUN BLD-MCNC: 49 MG/DL (ref 8–23)
BUN/CREAT SERPL: 7.1 (ref 7–25)
CALCIUM SPEC-SCNC: 9.4 MG/DL (ref 8.6–10.5)
CHLORIDE SERPL-SCNC: 81 MMOL/L (ref 98–107)
CO2 SERPL-SCNC: 26 MMOL/L (ref 22–29)
CREAT BLD-MCNC: 6.93 MG/DL (ref 0.57–1)
CRP SERPL-MCNC: 5.15 MG/DL (ref 0–0.5)
DEPRECATED RDW RBC AUTO: 63.9 FL (ref 37–54)
EOSINOPHIL # BLD AUTO: 0.3 10*3/MM3 (ref 0–0.4)
EOSINOPHIL NFR BLD AUTO: 3.9 % (ref 0.3–6.2)
ERYTHROCYTE [DISTWIDTH] IN BLOOD BY AUTOMATED COUNT: 20.2 % (ref 12.3–15.4)
ERYTHROCYTE [SEDIMENTATION RATE] IN BLOOD: 47 MM/HR (ref 0–20)
GFR SERPL CREATININE-BSD FRML MDRD: 6 ML/MIN/1.73
GFR SERPL CREATININE-BSD FRML MDRD: ABNORMAL ML/MIN/{1.73_M2}
GLOBULIN UR ELPH-MCNC: 3.1 GM/DL
GLUCOSE BLD-MCNC: 260 MG/DL (ref 65–99)
HCT VFR BLD AUTO: 30.2 % (ref 34–46.6)
HGB BLD-MCNC: 9 G/DL (ref 12–15.9)
HOLD SPECIMEN: NORMAL
IMM GRANULOCYTES # BLD AUTO: 0.04 10*3/MM3 (ref 0–0.05)
IMM GRANULOCYTES NFR BLD AUTO: 0.5 % (ref 0–0.5)
INR PPP: 1.16 (ref 0.91–1.09)
LYMPHOCYTES # BLD AUTO: 1.35 10*3/MM3 (ref 0.7–3.1)
LYMPHOCYTES NFR BLD AUTO: 17.7 % (ref 19.6–45.3)
MCH RBC QN AUTO: 26.6 PG (ref 26.6–33)
MCHC RBC AUTO-ENTMCNC: 29.8 G/DL (ref 31.5–35.7)
MCV RBC AUTO: 89.3 FL (ref 79–97)
MONOCYTES # BLD AUTO: 0.44 10*3/MM3 (ref 0.1–0.9)
MONOCYTES NFR BLD AUTO: 5.8 % (ref 5–12)
NEUTROPHILS # BLD AUTO: 5.47 10*3/MM3 (ref 1.7–7)
NEUTROPHILS NFR BLD AUTO: 71.6 % (ref 42.7–76)
NRBC BLD AUTO-RTO: 0.9 /100 WBC (ref 0–0.2)
NT-PROBNP SERPL-MCNC: ABNORMAL PG/ML (ref 5–900)
PLATELET # BLD AUTO: 335 10*3/MM3 (ref 140–450)
PMV BLD AUTO: 8.8 FL (ref 6–12)
POTASSIUM BLD-SCNC: 5.7 MMOL/L (ref 3.5–5.2)
PROT SERPL-MCNC: 7 G/DL (ref 6–8.5)
PROTHROMBIN TIME: 14.4 SECONDS (ref 11.9–14.6)
RBC # BLD AUTO: 3.38 10*6/MM3 (ref 3.77–5.28)
S PYO AG THROAT QL: NEGATIVE
SODIUM BLD-SCNC: 125 MMOL/L (ref 136–145)
TROPONIN T SERPL-MCNC: 0.04 NG/ML (ref 0–0.03)
WBC NRBC COR # BLD: 7.64 10*3/MM3 (ref 3.4–10.8)
WHOLE BLOOD HOLD SPECIMEN: NORMAL
WHOLE BLOOD HOLD SPECIMEN: NORMAL

## 2020-04-27 PROCEDURE — 86706 HEP B SURFACE ANTIBODY: CPT | Performed by: INTERNAL MEDICINE

## 2020-04-27 PROCEDURE — 83880 ASSAY OF NATRIURETIC PEPTIDE: CPT | Performed by: EMERGENCY MEDICINE

## 2020-04-27 PROCEDURE — 93010 ELECTROCARDIOGRAM REPORT: CPT | Performed by: INTERNAL MEDICINE

## 2020-04-27 PROCEDURE — 87081 CULTURE SCREEN ONLY: CPT | Performed by: PHYSICIAN ASSISTANT

## 2020-04-27 PROCEDURE — 84100 ASSAY OF PHOSPHORUS: CPT | Performed by: INTERNAL MEDICINE

## 2020-04-27 PROCEDURE — 85610 PROTHROMBIN TIME: CPT | Performed by: EMERGENCY MEDICINE

## 2020-04-27 PROCEDURE — 87880 STREP A ASSAY W/OPTIC: CPT | Performed by: PHYSICIAN ASSISTANT

## 2020-04-27 PROCEDURE — 87635 SARS-COV-2 COVID-19 AMP PRB: CPT | Performed by: PHYSICIAN ASSISTANT

## 2020-04-27 PROCEDURE — 83735 ASSAY OF MAGNESIUM: CPT | Performed by: INTERNAL MEDICINE

## 2020-04-27 PROCEDURE — 84484 ASSAY OF TROPONIN QUANT: CPT | Performed by: EMERGENCY MEDICINE

## 2020-04-27 PROCEDURE — 85025 COMPLETE CBC W/AUTO DIFF WBC: CPT | Performed by: EMERGENCY MEDICINE

## 2020-04-27 PROCEDURE — 99285 EMERGENCY DEPT VISIT HI MDM: CPT

## 2020-04-27 PROCEDURE — 86140 C-REACTIVE PROTEIN: CPT | Performed by: PHYSICIAN ASSISTANT

## 2020-04-27 PROCEDURE — 85651 RBC SED RATE NONAUTOMATED: CPT | Performed by: PHYSICIAN ASSISTANT

## 2020-04-27 PROCEDURE — 93005 ELECTROCARDIOGRAM TRACING: CPT | Performed by: EMERGENCY MEDICINE

## 2020-04-27 PROCEDURE — 80053 COMPREHEN METABOLIC PANEL: CPT | Performed by: EMERGENCY MEDICINE

## 2020-04-27 PROCEDURE — 36415 COLL VENOUS BLD VENIPUNCTURE: CPT

## 2020-04-27 PROCEDURE — 71045 X-RAY EXAM CHEST 1 VIEW: CPT

## 2020-04-27 RX ORDER — SODIUM CHLORIDE 0.9 % (FLUSH) 0.9 %
10 SYRINGE (ML) INJECTION AS NEEDED
Status: DISCONTINUED | OUTPATIENT
Start: 2020-04-27 | End: 2020-04-29 | Stop reason: HOSPADM

## 2020-04-28 PROBLEM — E11.39 DIABETES MELLITUS WITH OPHTHALMIC COMPLICATION (HCC): Status: ACTIVE | Noted: 2020-04-28

## 2020-04-28 PROBLEM — I50.31 ACUTE DIASTOLIC HEART FAILURE (HCC): Status: ACTIVE | Noted: 2020-01-27

## 2020-04-28 PROBLEM — Z72.0 TOBACCO ABUSE: Chronic | Status: ACTIVE | Noted: 2020-04-28

## 2020-04-28 LAB
ALBUMIN SERPL-MCNC: 3.8 G/DL (ref 3.5–5.2)
ALBUMIN/GLOB SERPL: 1.3 G/DL
ALP SERPL-CCNC: 356 U/L (ref 39–117)
ALT SERPL W P-5'-P-CCNC: 26 U/L (ref 1–33)
ANION GAP SERPL CALCULATED.3IONS-SCNC: 17 MMOL/L (ref 5–15)
AST SERPL-CCNC: 28 U/L (ref 1–32)
BASOPHILS # BLD AUTO: 0.03 10*3/MM3 (ref 0–0.2)
BASOPHILS NFR BLD AUTO: 0.4 % (ref 0–1.5)
BILIRUB SERPL-MCNC: 0.3 MG/DL (ref 0.2–1.2)
BUN BLD-MCNC: 52 MG/DL (ref 8–23)
BUN/CREAT SERPL: 7.1 (ref 7–25)
CALCIUM SPEC-SCNC: 9.4 MG/DL (ref 8.6–10.5)
CHLORIDE SERPL-SCNC: 82 MMOL/L (ref 98–107)
CO2 SERPL-SCNC: 25 MMOL/L (ref 22–29)
CREAT BLD-MCNC: 7.34 MG/DL (ref 0.57–1)
DEPRECATED RDW RBC AUTO: 63.7 FL (ref 37–54)
EOSINOPHIL # BLD AUTO: 0.24 10*3/MM3 (ref 0–0.4)
EOSINOPHIL NFR BLD AUTO: 3.3 % (ref 0.3–6.2)
ERYTHROCYTE [DISTWIDTH] IN BLOOD BY AUTOMATED COUNT: 20 % (ref 12.3–15.4)
GFR SERPL CREATININE-BSD FRML MDRD: 6 ML/MIN/1.73
GFR SERPL CREATININE-BSD FRML MDRD: ABNORMAL ML/MIN/{1.73_M2}
GLOBULIN UR ELPH-MCNC: 3 GM/DL
GLUCOSE BLD-MCNC: 186 MG/DL (ref 65–99)
GLUCOSE BLDC GLUCOMTR-MCNC: 126 MG/DL (ref 70–130)
GLUCOSE BLDC GLUCOMTR-MCNC: 193 MG/DL (ref 70–130)
GLUCOSE BLDC GLUCOMTR-MCNC: 227 MG/DL (ref 70–130)
GLUCOSE BLDC GLUCOMTR-MCNC: 96 MG/DL (ref 70–130)
HBV SURFACE AB SER RIA-ACNC: REACTIVE
HCT VFR BLD AUTO: 31 % (ref 34–46.6)
HGB BLD-MCNC: 9.2 G/DL (ref 12–15.9)
HOLD SPECIMEN: NORMAL
HOLD SPECIMEN: NORMAL
IMM GRANULOCYTES # BLD AUTO: 0.03 10*3/MM3 (ref 0–0.05)
IMM GRANULOCYTES NFR BLD AUTO: 0.4 % (ref 0–0.5)
LYMPHOCYTES # BLD AUTO: 0.86 10*3/MM3 (ref 0.7–3.1)
LYMPHOCYTES NFR BLD AUTO: 11.7 % (ref 19.6–45.3)
MAGNESIUM SERPL-MCNC: 2.7 MG/DL (ref 1.6–2.4)
MAGNESIUM SERPL-MCNC: 2.7 MG/DL (ref 1.6–2.4)
MCH RBC QN AUTO: 26.7 PG (ref 26.6–33)
MCHC RBC AUTO-ENTMCNC: 29.7 G/DL (ref 31.5–35.7)
MCV RBC AUTO: 90.1 FL (ref 79–97)
MONOCYTES # BLD AUTO: 0.42 10*3/MM3 (ref 0.1–0.9)
MONOCYTES NFR BLD AUTO: 5.7 % (ref 5–12)
NEUTROPHILS # BLD AUTO: 5.79 10*3/MM3 (ref 1.7–7)
NEUTROPHILS NFR BLD AUTO: 78.5 % (ref 42.7–76)
NRBC BLD AUTO-RTO: 1.2 /100 WBC (ref 0–0.2)
PHOSPHATE SERPL-MCNC: 4.1 MG/DL (ref 2.5–4.5)
PHOSPHATE SERPL-MCNC: 4.8 MG/DL (ref 2.5–4.5)
PLATELET # BLD AUTO: 346 10*3/MM3 (ref 140–450)
PMV BLD AUTO: 8.9 FL (ref 6–12)
POTASSIUM BLD-SCNC: 6.3 MMOL/L (ref 3.5–5.2)
PROT SERPL-MCNC: 6.8 G/DL (ref 6–8.5)
RBC # BLD AUTO: 3.44 10*6/MM3 (ref 3.77–5.28)
SARS-COV-2 RNA RESP QL NAA+PROBE: NOT DETECTED
SODIUM BLD-SCNC: 124 MMOL/L (ref 136–145)
TSH SERPL DL<=0.05 MIU/L-ACNC: 4.97 UIU/ML (ref 0.27–4.2)
WBC NRBC COR # BLD: 7.37 10*3/MM3 (ref 3.4–10.8)

## 2020-04-28 PROCEDURE — 80053 COMPREHEN METABOLIC PANEL: CPT | Performed by: INTERNAL MEDICINE

## 2020-04-28 PROCEDURE — G0378 HOSPITAL OBSERVATION PER HR: HCPCS

## 2020-04-28 PROCEDURE — G0257 UNSCHED DIALYSIS ESRD PT HOS: HCPCS

## 2020-04-28 PROCEDURE — 84100 ASSAY OF PHOSPHORUS: CPT | Performed by: INTERNAL MEDICINE

## 2020-04-28 PROCEDURE — 96372 THER/PROPH/DIAG INJ SC/IM: CPT

## 2020-04-28 PROCEDURE — 94640 AIRWAY INHALATION TREATMENT: CPT

## 2020-04-28 PROCEDURE — 94799 UNLISTED PULMONARY SVC/PX: CPT

## 2020-04-28 PROCEDURE — 25010000002 ONDANSETRON PER 1 MG: Performed by: INTERNAL MEDICINE

## 2020-04-28 PROCEDURE — 96374 THER/PROPH/DIAG INJ IV PUSH: CPT

## 2020-04-28 PROCEDURE — 25010000002 ENOXAPARIN PER 10 MG: Performed by: INTERNAL MEDICINE

## 2020-04-28 PROCEDURE — 96375 TX/PRO/DX INJ NEW DRUG ADDON: CPT

## 2020-04-28 PROCEDURE — 63710000001 INSULIN LISPRO (HUMAN) PER 5 UNITS: Performed by: INTERNAL MEDICINE

## 2020-04-28 PROCEDURE — 84443 ASSAY THYROID STIM HORMONE: CPT | Performed by: INTERNAL MEDICINE

## 2020-04-28 PROCEDURE — 85025 COMPLETE CBC W/AUTO DIFF WBC: CPT | Performed by: INTERNAL MEDICINE

## 2020-04-28 PROCEDURE — 82962 GLUCOSE BLOOD TEST: CPT

## 2020-04-28 PROCEDURE — 25010000002 FUROSEMIDE PER 20 MG: Performed by: INTERNAL MEDICINE

## 2020-04-28 PROCEDURE — 83735 ASSAY OF MAGNESIUM: CPT | Performed by: INTERNAL MEDICINE

## 2020-04-28 PROCEDURE — 63710000001 INSULIN DETEMIR PER 5 UNITS: Performed by: INTERNAL MEDICINE

## 2020-04-28 RX ORDER — ATROPINE SULFATE 10 MG/ML
1 SOLUTION/ DROPS OPHTHALMIC 3 TIMES DAILY
Status: DISCONTINUED | OUTPATIENT
Start: 2020-04-28 | End: 2020-04-29 | Stop reason: HOSPADM

## 2020-04-28 RX ORDER — DEXTROSE MONOHYDRATE 25 G/50ML
25 INJECTION, SOLUTION INTRAVENOUS
Status: DISCONTINUED | OUTPATIENT
Start: 2020-04-28 | End: 2020-04-29 | Stop reason: HOSPADM

## 2020-04-28 RX ORDER — ACETAMINOPHEN 650 MG/1
650 SUPPOSITORY RECTAL EVERY 4 HOURS PRN
Status: DISCONTINUED | OUTPATIENT
Start: 2020-04-28 | End: 2020-04-29 | Stop reason: HOSPADM

## 2020-04-28 RX ORDER — DORZOLAMIDE HCL 20 MG/ML
1 SOLUTION/ DROPS OPHTHALMIC 3 TIMES DAILY
Status: DISCONTINUED | OUTPATIENT
Start: 2020-04-28 | End: 2020-04-29 | Stop reason: HOSPADM

## 2020-04-28 RX ORDER — FAMOTIDINE 20 MG/1
20 TABLET, FILM COATED ORAL
Status: DISCONTINUED | OUTPATIENT
Start: 2020-04-28 | End: 2020-04-29 | Stop reason: HOSPADM

## 2020-04-28 RX ORDER — ONDANSETRON 4 MG/1
4 TABLET, FILM COATED ORAL EVERY 6 HOURS PRN
Status: DISCONTINUED | OUTPATIENT
Start: 2020-04-28 | End: 2020-04-29 | Stop reason: HOSPADM

## 2020-04-28 RX ORDER — ACETAMINOPHEN 160 MG/5ML
650 SOLUTION ORAL EVERY 4 HOURS PRN
Status: DISCONTINUED | OUTPATIENT
Start: 2020-04-28 | End: 2020-04-29 | Stop reason: HOSPADM

## 2020-04-28 RX ORDER — TAFLUPROST OPTHALMIC 0 MG/.3ML
1 SOLUTION/ DROPS OPHTHALMIC NIGHTLY
Status: DISCONTINUED | OUTPATIENT
Start: 2020-04-28 | End: 2020-04-29 | Stop reason: HOSPADM

## 2020-04-28 RX ORDER — CALCITRIOL 0.25 UG/1
0.25 CAPSULE, LIQUID FILLED ORAL
Status: DISCONTINUED | OUTPATIENT
Start: 2020-04-28 | End: 2020-04-29 | Stop reason: HOSPADM

## 2020-04-28 RX ORDER — SEVELAMER CARBONATE 800 MG/1
800 TABLET, FILM COATED ORAL
Status: DISCONTINUED | OUTPATIENT
Start: 2020-04-28 | End: 2020-04-29 | Stop reason: HOSPADM

## 2020-04-28 RX ORDER — CALCITRIOL 0.25 UG/1
0.25 CAPSULE, LIQUID FILLED ORAL TAKE AS DIRECTED
Status: DISCONTINUED | OUTPATIENT
Start: 2020-04-28 | End: 2020-04-28

## 2020-04-28 RX ORDER — CARVEDILOL 6.25 MG/1
12.5 TABLET ORAL 2 TIMES DAILY WITH MEALS
Status: DISCONTINUED | OUTPATIENT
Start: 2020-04-28 | End: 2020-04-29 | Stop reason: HOSPADM

## 2020-04-28 RX ORDER — SODIUM CHLORIDE 0.9 % (FLUSH) 0.9 %
10 SYRINGE (ML) INJECTION EVERY 12 HOURS SCHEDULED
Status: DISCONTINUED | OUTPATIENT
Start: 2020-04-28 | End: 2020-04-29 | Stop reason: HOSPADM

## 2020-04-28 RX ORDER — SODIUM CHLORIDE 0.9 % (FLUSH) 0.9 %
10 SYRINGE (ML) INJECTION AS NEEDED
Status: DISCONTINUED | OUTPATIENT
Start: 2020-04-28 | End: 2020-04-29 | Stop reason: HOSPADM

## 2020-04-28 RX ORDER — ASPIRIN 81 MG/1
81 TABLET ORAL DAILY
Status: DISCONTINUED | OUTPATIENT
Start: 2020-04-28 | End: 2020-04-29 | Stop reason: HOSPADM

## 2020-04-28 RX ORDER — LOSARTAN POTASSIUM 50 MG/1
50 TABLET ORAL 2 TIMES DAILY
Status: DISCONTINUED | OUTPATIENT
Start: 2020-04-28 | End: 2020-04-29 | Stop reason: HOSPADM

## 2020-04-28 RX ORDER — NICOTINE POLACRILEX 4 MG
15 LOZENGE BUCCAL
Status: DISCONTINUED | OUTPATIENT
Start: 2020-04-28 | End: 2020-04-29 | Stop reason: HOSPADM

## 2020-04-28 RX ORDER — NICOTINE 21 MG/24HR
1 PATCH, TRANSDERMAL 24 HOURS TRANSDERMAL EVERY 24 HOURS
Status: DISCONTINUED | OUTPATIENT
Start: 2020-04-28 | End: 2020-04-29 | Stop reason: HOSPADM

## 2020-04-28 RX ORDER — MELATONIN
2000 DAILY
Status: DISCONTINUED | OUTPATIENT
Start: 2020-04-28 | End: 2020-04-29 | Stop reason: HOSPADM

## 2020-04-28 RX ORDER — LEVOTHYROXINE SODIUM 0.03 MG/1
25 TABLET ORAL
Status: DISCONTINUED | OUTPATIENT
Start: 2020-04-28 | End: 2020-04-29 | Stop reason: HOSPADM

## 2020-04-28 RX ORDER — PRAVASTATIN SODIUM 20 MG
10 TABLET ORAL NIGHTLY
Status: DISCONTINUED | OUTPATIENT
Start: 2020-04-28 | End: 2020-04-29 | Stop reason: HOSPADM

## 2020-04-28 RX ORDER — FLUTICASONE PROPIONATE 50 MCG
2 SPRAY, SUSPENSION (ML) NASAL 2 TIMES DAILY
Status: DISCONTINUED | OUTPATIENT
Start: 2020-04-28 | End: 2020-04-29 | Stop reason: HOSPADM

## 2020-04-28 RX ORDER — ONDANSETRON 2 MG/ML
4 INJECTION INTRAMUSCULAR; INTRAVENOUS EVERY 6 HOURS PRN
Status: DISCONTINUED | OUTPATIENT
Start: 2020-04-28 | End: 2020-04-29 | Stop reason: HOSPADM

## 2020-04-28 RX ORDER — BISMUTH SUBSALICYLATE 262 MG/1
262 TABLET, CHEWABLE ORAL DAILY PRN
Status: DISCONTINUED | OUTPATIENT
Start: 2020-04-28 | End: 2020-04-29 | Stop reason: HOSPADM

## 2020-04-28 RX ORDER — TIMOLOL MALEATE 5 MG/ML
1 SOLUTION/ DROPS OPHTHALMIC
Status: DISCONTINUED | OUTPATIENT
Start: 2020-04-28 | End: 2020-04-29 | Stop reason: HOSPADM

## 2020-04-28 RX ORDER — FUROSEMIDE 10 MG/ML
100 INJECTION INTRAMUSCULAR; INTRAVENOUS ONCE
Status: COMPLETED | OUTPATIENT
Start: 2020-04-28 | End: 2020-04-28

## 2020-04-28 RX ORDER — ALBUTEROL SULFATE 2.5 MG/3ML
2.5 SOLUTION RESPIRATORY (INHALATION) EVERY 4 HOURS PRN
Status: DISCONTINUED | OUTPATIENT
Start: 2020-04-28 | End: 2020-04-29

## 2020-04-28 RX ORDER — HYDROCODONE BITARTRATE AND ACETAMINOPHEN 5; 325 MG/1; MG/1
1 TABLET ORAL EVERY 6 HOURS PRN
Status: DISCONTINUED | OUTPATIENT
Start: 2020-04-28 | End: 2020-04-29 | Stop reason: HOSPADM

## 2020-04-28 RX ORDER — ACETAMINOPHEN 325 MG/1
650 TABLET ORAL EVERY 4 HOURS PRN
Status: DISCONTINUED | OUTPATIENT
Start: 2020-04-28 | End: 2020-04-29 | Stop reason: HOSPADM

## 2020-04-28 RX ORDER — BRIMONIDINE TARTRATE 2 MG/ML
1 SOLUTION/ DROPS OPHTHALMIC 3 TIMES DAILY
Status: DISCONTINUED | OUTPATIENT
Start: 2020-04-28 | End: 2020-04-29 | Stop reason: HOSPADM

## 2020-04-28 RX ORDER — ALBUTEROL SULFATE 90 UG/1
2 AEROSOL, METERED RESPIRATORY (INHALATION) EVERY 4 HOURS PRN
Status: DISCONTINUED | OUTPATIENT
Start: 2020-04-28 | End: 2020-04-28 | Stop reason: CLARIF

## 2020-04-28 RX ADMIN — VITAMIN D 2000 UNITS: 25 TAB ORAL at 08:22

## 2020-04-28 RX ADMIN — ENOXAPARIN SODIUM 30 MG: 30 INJECTION SUBCUTANEOUS at 08:22

## 2020-04-28 RX ADMIN — LOSARTAN POTASSIUM 50 MG: 50 TABLET ORAL at 08:22

## 2020-04-28 RX ADMIN — DORZOLAMIDE HYDROCHLORIDE 1 DROP: 20 SOLUTION/ DROPS OPHTHALMIC at 08:25

## 2020-04-28 RX ADMIN — BRIMONIDINE TARTRATE 1 DROP: 2 SOLUTION OPHTHALMIC at 21:48

## 2020-04-28 RX ADMIN — TAFLUPROST 1 DROP: 0 SOLUTION/ DROPS OPHTHALMIC at 03:54

## 2020-04-28 RX ADMIN — CARVEDILOL 12.5 MG: 6.25 TABLET, FILM COATED ORAL at 17:51

## 2020-04-28 RX ADMIN — LEVOTHYROXINE SODIUM 25 MCG: 25 TABLET ORAL at 05:48

## 2020-04-28 RX ADMIN — INSULIN DETEMIR 10 UNITS: 100 INJECTION, SOLUTION SUBCUTANEOUS at 21:43

## 2020-04-28 RX ADMIN — SODIUM CHLORIDE, PRESERVATIVE FREE 10 ML: 5 INJECTION INTRAVENOUS at 21:48

## 2020-04-28 RX ADMIN — FAMOTIDINE 20 MG: 20 TABLET, FILM COATED ORAL at 17:51

## 2020-04-28 RX ADMIN — DORZOLAMIDE HYDROCHLORIDE 1 DROP: 20 SOLUTION/ DROPS OPHTHALMIC at 17:52

## 2020-04-28 RX ADMIN — DORZOLAMIDE HYDROCHLORIDE 1 DROP: 20 SOLUTION/ DROPS OPHTHALMIC at 21:47

## 2020-04-28 RX ADMIN — SEVELAMER CARBONATE 800 MG: 800 TABLET, FILM COATED ORAL at 17:51

## 2020-04-28 RX ADMIN — TAFLUPROST 1 DROP: 0 SOLUTION/ DROPS OPHTHALMIC at 21:47

## 2020-04-28 RX ADMIN — SODIUM CHLORIDE, PRESERVATIVE FREE 10 ML: 5 INJECTION INTRAVENOUS at 03:47

## 2020-04-28 RX ADMIN — CALCITRIOL 0.25 MCG: 0.25 CAPSULE ORAL at 08:22

## 2020-04-28 RX ADMIN — TIMOLOL MALEATE 1 DROP: 5 SOLUTION/ DROPS OPHTHALMIC at 05:48

## 2020-04-28 RX ADMIN — ONDANSETRON HYDROCHLORIDE 4 MG: 2 SOLUTION INTRAMUSCULAR; INTRAVENOUS at 02:50

## 2020-04-28 RX ADMIN — HYDROCODONE BITARTRATE AND ACETAMINOPHEN 1 TABLET: 5; 325 TABLET ORAL at 11:48

## 2020-04-28 RX ADMIN — PRAVASTATIN SODIUM 10 MG: 20 TABLET ORAL at 21:56

## 2020-04-28 RX ADMIN — CARVEDILOL 12.5 MG: 6.25 TABLET, FILM COATED ORAL at 08:22

## 2020-04-28 RX ADMIN — LOSARTAN POTASSIUM 50 MG: 50 TABLET ORAL at 21:46

## 2020-04-28 RX ADMIN — FUROSEMIDE 100 MG: 10 INJECTION, SOLUTION INTRAMUSCULAR; INTRAVENOUS at 03:48

## 2020-04-28 RX ADMIN — ALBUTEROL SULFATE 2.5 MG: 2.5 SOLUTION RESPIRATORY (INHALATION) at 19:11

## 2020-04-28 RX ADMIN — FLUTICASONE PROPIONATE 2 SPRAY: 50 SPRAY, METERED NASAL at 21:45

## 2020-04-28 RX ADMIN — NICOTINE 1 PATCH: 21 PATCH, EXTENDED RELEASE TRANSDERMAL at 08:22

## 2020-04-28 RX ADMIN — PRAVASTATIN SODIUM 10 MG: 20 TABLET ORAL at 03:47

## 2020-04-28 RX ADMIN — SODIUM CHLORIDE, PRESERVATIVE FREE 10 ML: 5 INJECTION INTRAVENOUS at 08:27

## 2020-04-28 RX ADMIN — ASPIRIN 81 MG: 81 TABLET ORAL at 08:22

## 2020-04-28 RX ADMIN — SEVELAMER CARBONATE 800 MG: 800 TABLET, FILM COATED ORAL at 08:22

## 2020-04-28 RX ADMIN — BRIMONIDINE TARTRATE 1 DROP: 2 SOLUTION OPHTHALMIC at 17:52

## 2020-04-28 RX ADMIN — FLUTICASONE PROPIONATE 2 SPRAY: 50 SPRAY, METERED NASAL at 08:25

## 2020-04-28 RX ADMIN — BRIMONIDINE TARTRATE 1 DROP: 2 SOLUTION OPHTHALMIC at 08:26

## 2020-04-28 RX ADMIN — INSULIN LISPRO 2 UNITS: 100 INJECTION, SOLUTION INTRAVENOUS; SUBCUTANEOUS at 08:27

## 2020-04-28 RX ADMIN — SERTRALINE 25 MG: 50 TABLET, FILM COATED ORAL at 08:22

## 2020-04-28 RX ADMIN — ATROPINE SULFATE 1 DROP: 10 SOLUTION/ DROPS OPHTHALMIC at 08:27

## 2020-04-29 ENCOUNTER — READMISSION MANAGEMENT (OUTPATIENT)
Dept: CALL CENTER | Facility: HOSPITAL | Age: 60
End: 2020-04-29

## 2020-04-29 VITALS
SYSTOLIC BLOOD PRESSURE: 145 MMHG | RESPIRATION RATE: 16 BRPM | HEART RATE: 69 BPM | DIASTOLIC BLOOD PRESSURE: 79 MMHG | WEIGHT: 143.31 LBS | TEMPERATURE: 97.8 F | BODY MASS INDEX: 30.08 KG/M2 | OXYGEN SATURATION: 96 % | HEIGHT: 58 IN

## 2020-04-29 LAB
ALBUMIN SERPL-MCNC: 3.3 G/DL (ref 3.5–5.2)
ALBUMIN/GLOB SERPL: 1.1 G/DL
ALP SERPL-CCNC: 289 U/L (ref 39–117)
ALT SERPL W P-5'-P-CCNC: 22 U/L (ref 1–33)
ANION GAP SERPL CALCULATED.3IONS-SCNC: 12 MMOL/L (ref 5–15)
AST SERPL-CCNC: 27 U/L (ref 1–32)
ATMOSPHERIC PRESS: NORMAL MM[HG]
BASE EXCESS BLDV CALC-SCNC: NORMAL MMOL/L
BDY SITE: NORMAL
BILIRUB SERPL-MCNC: 0.3 MG/DL (ref 0.2–1.2)
BUN BLD-MCNC: 20 MG/DL (ref 8–23)
BUN/CREAT SERPL: 4.9 (ref 7–25)
CALCIUM SPEC-SCNC: 8.9 MG/DL (ref 8.6–10.5)
CHLORIDE SERPL-SCNC: 88 MMOL/L (ref 98–107)
CO2 SERPL-SCNC: 28 MMOL/L (ref 22–29)
CREAT BLD-MCNC: 4.07 MG/DL (ref 0.57–1)
GFR SERPL CREATININE-BSD FRML MDRD: 11 ML/MIN/1.73
GFR SERPL CREATININE-BSD FRML MDRD: ABNORMAL ML/MIN/{1.73_M2}
GLOBULIN UR ELPH-MCNC: 3 GM/DL
GLUCOSE BLD-MCNC: 177 MG/DL (ref 65–99)
GLUCOSE BLDC GLUCOMTR-MCNC: 110 MG/DL (ref 70–130)
HCO3 BLDV-SCNC: NORMAL MMOL/L
MODALITY: NORMAL
PCO2 BLDV: NORMAL MM[HG]
PH BLDV: NORMAL [PH]
PO2 BLDV: NORMAL MM[HG]
POTASSIUM BLD-SCNC: 5 MMOL/L (ref 3.5–5.2)
PROT SERPL-MCNC: 6.3 G/DL (ref 6–8.5)
SODIUM BLD-SCNC: 128 MMOL/L (ref 136–145)
T4 FREE SERPL-MCNC: 1.19 NG/DL (ref 0.93–1.7)

## 2020-04-29 PROCEDURE — 25010000002 ENOXAPARIN PER 10 MG: Performed by: INTERNAL MEDICINE

## 2020-04-29 PROCEDURE — 94799 UNLISTED PULMONARY SVC/PX: CPT

## 2020-04-29 PROCEDURE — 84439 ASSAY OF FREE THYROXINE: CPT | Performed by: FAMILY MEDICINE

## 2020-04-29 PROCEDURE — 80053 COMPREHEN METABOLIC PANEL: CPT | Performed by: NURSE PRACTITIONER

## 2020-04-29 PROCEDURE — 82962 GLUCOSE BLOOD TEST: CPT

## 2020-04-29 PROCEDURE — 96372 THER/PROPH/DIAG INJ SC/IM: CPT

## 2020-04-29 PROCEDURE — G0378 HOSPITAL OBSERVATION PER HR: HCPCS

## 2020-04-29 RX ORDER — FAMOTIDINE 20 MG/1
20 TABLET, FILM COATED ORAL 2 TIMES DAILY
Qty: 60 TABLET | Refills: 2 | Status: SHIPPED | OUTPATIENT
Start: 2020-04-29 | End: 2021-01-01 | Stop reason: HOSPADM

## 2020-04-29 RX ORDER — NICOTINE 21 MG/24HR
1 PATCH, TRANSDERMAL 24 HOURS TRANSDERMAL EVERY 24 HOURS
Qty: 30 PATCH | Refills: 2 | Status: ON HOLD | OUTPATIENT
Start: 2020-04-30 | End: 2021-01-01

## 2020-04-29 RX ORDER — IPRATROPIUM BROMIDE AND ALBUTEROL SULFATE 2.5; .5 MG/3ML; MG/3ML
3 SOLUTION RESPIRATORY (INHALATION)
Status: DISCONTINUED | OUTPATIENT
Start: 2020-04-29 | End: 2020-04-29 | Stop reason: HOSPADM

## 2020-04-29 RX ORDER — ONDANSETRON 4 MG/1
4 TABLET, FILM COATED ORAL EVERY 6 HOURS PRN
Qty: 24 TABLET | Refills: 2 | Status: ON HOLD | OUTPATIENT
Start: 2020-04-29 | End: 2021-01-01

## 2020-04-29 RX ADMIN — NICOTINE 1 PATCH: 21 PATCH, EXTENDED RELEASE TRANSDERMAL at 08:33

## 2020-04-29 RX ADMIN — ACETAMINOPHEN 650 MG: 325 TABLET, FILM COATED ORAL at 03:39

## 2020-04-29 RX ADMIN — ATROPINE SULFATE 1 DROP: 10 SOLUTION/ DROPS OPHTHALMIC at 08:34

## 2020-04-29 RX ADMIN — IPRATROPIUM BROMIDE AND ALBUTEROL SULFATE 3 ML: 2.5; .5 SOLUTION RESPIRATORY (INHALATION) at 11:10

## 2020-04-29 RX ADMIN — CARVEDILOL 12.5 MG: 6.25 TABLET, FILM COATED ORAL at 08:33

## 2020-04-29 RX ADMIN — BRIMONIDINE TARTRATE 1 DROP: 2 SOLUTION OPHTHALMIC at 08:32

## 2020-04-29 RX ADMIN — ASPIRIN 81 MG: 81 TABLET ORAL at 08:34

## 2020-04-29 RX ADMIN — TIMOLOL MALEATE 1 DROP: 5 SOLUTION/ DROPS OPHTHALMIC at 06:51

## 2020-04-29 RX ADMIN — FLUTICASONE PROPIONATE 2 SPRAY: 50 SPRAY, METERED NASAL at 08:32

## 2020-04-29 RX ADMIN — SEVELAMER CARBONATE 800 MG: 800 TABLET, FILM COATED ORAL at 08:33

## 2020-04-29 RX ADMIN — SODIUM CHLORIDE, PRESERVATIVE FREE 10 ML: 5 INJECTION INTRAVENOUS at 08:34

## 2020-04-29 RX ADMIN — VITAMIN D 2000 UNITS: 25 TAB ORAL at 08:34

## 2020-04-29 RX ADMIN — LEVOTHYROXINE SODIUM 25 MCG: 25 TABLET ORAL at 06:51

## 2020-04-29 RX ADMIN — ENOXAPARIN SODIUM 30 MG: 30 INJECTION SUBCUTANEOUS at 08:34

## 2020-04-29 RX ADMIN — LOSARTAN POTASSIUM 50 MG: 50 TABLET ORAL at 08:35

## 2020-04-29 RX ADMIN — DORZOLAMIDE HYDROCHLORIDE 1 DROP: 20 SOLUTION/ DROPS OPHTHALMIC at 08:36

## 2020-04-29 RX ADMIN — SERTRALINE 25 MG: 50 TABLET, FILM COATED ORAL at 08:35

## 2020-04-29 NOTE — OUTREACH NOTE
Prep Survey      Responses   [facility] facility patient discharged from?  [place]   Is LACE score < 7 ?  No   Eligibility  Readm Mgmt   Discharge diagnosis  Acute pulmonary edema    COVID-19 Test Status  Negative   Does the patient have one of the following disease processes/diagnoses(primary or secondary)?  Other   Does the patient have Home health ordered?  Yes   What is the Home health agency?   [facility]   Is there a DME ordered?  No   Comments regarding appointments  Dialysis - Tuesday Thursday and Saturday   General alerts for this patient  history of noncompliance with dialysis and several admissions for volume overload   Prep survey completed?  Yes          Perla Rogers RN

## 2020-04-30 ENCOUNTER — HOSPITAL ENCOUNTER (OUTPATIENT)
Facility: HOSPITAL | Age: 60
Setting detail: OBSERVATION
Discharge: HOME | End: 2020-05-02
Attending: INTERNAL MEDICINE | Admitting: FAMILY MEDICINE

## 2020-04-30 ENCOUNTER — READMISSION MANAGEMENT (OUTPATIENT)
Dept: CALL CENTER | Facility: HOSPITAL | Age: 60
End: 2020-04-30

## 2020-04-30 ENCOUNTER — APPOINTMENT (OUTPATIENT)
Dept: GENERAL RADIOLOGY | Facility: HOSPITAL | Age: 60
End: 2020-04-30

## 2020-04-30 DIAGNOSIS — E87.1 HYPONATREMIA: ICD-10-CM

## 2020-04-30 DIAGNOSIS — N18.6 STAGE 5 CHRONIC KIDNEY DISEASE ON CHRONIC DIALYSIS (HCC): ICD-10-CM

## 2020-04-30 DIAGNOSIS — R11.2 NAUSEA VOMITING AND DIARRHEA: ICD-10-CM

## 2020-04-30 DIAGNOSIS — E87.5 HYPERKALEMIA: ICD-10-CM

## 2020-04-30 DIAGNOSIS — R31.9 URINARY TRACT INFECTION WITH HEMATURIA, SITE UNSPECIFIED: Primary | ICD-10-CM

## 2020-04-30 DIAGNOSIS — Z99.2 STAGE 5 CHRONIC KIDNEY DISEASE ON CHRONIC DIALYSIS (HCC): ICD-10-CM

## 2020-04-30 DIAGNOSIS — R19.7 NAUSEA VOMITING AND DIARRHEA: ICD-10-CM

## 2020-04-30 DIAGNOSIS — N39.0 URINARY TRACT INFECTION WITH HEMATURIA, SITE UNSPECIFIED: Primary | ICD-10-CM

## 2020-04-30 PROBLEM — J18.9 PNEUMONIA DUE TO INFECTIOUS ORGANISM: Status: ACTIVE | Noted: 2020-04-30

## 2020-04-30 PROBLEM — R82.90 ABNORMAL URINE FINDINGS: Status: ACTIVE | Noted: 2020-04-30

## 2020-04-30 LAB
ALBUMIN SERPL-MCNC: 3.8 G/DL (ref 3.5–5.2)
ALBUMIN/GLOB SERPL: 1.3 G/DL
ALP SERPL-CCNC: 324 U/L (ref 39–117)
ALT SERPL W P-5'-P-CCNC: 30 U/L (ref 1–33)
ANION GAP SERPL CALCULATED.3IONS-SCNC: 18 MMOL/L (ref 5–15)
AST SERPL-CCNC: 43 U/L (ref 1–32)
BACTERIA SPEC AEROBE CULT: NORMAL
BACTERIA UR QL AUTO: ABNORMAL /HPF
BASOPHILS # BLD AUTO: 0.02 10*3/MM3 (ref 0–0.2)
BASOPHILS NFR BLD AUTO: 0.3 % (ref 0–1.5)
BILIRUB SERPL-MCNC: 0.3 MG/DL (ref 0.2–1.2)
BILIRUB UR QL STRIP: NEGATIVE
BUN BLD-MCNC: 28 MG/DL (ref 8–23)
BUN/CREAT SERPL: 5.6 (ref 7–25)
CALCIUM SPEC-SCNC: 9.5 MG/DL (ref 8.6–10.5)
CHLORIDE SERPL-SCNC: 83 MMOL/L (ref 98–107)
CLARITY UR: CLEAR
CO2 SERPL-SCNC: 23 MMOL/L (ref 22–29)
COLOR UR: YELLOW
CREAT BLD-MCNC: 5.02 MG/DL (ref 0.57–1)
D-LACTATE SERPL-SCNC: 1 MMOL/L (ref 0.5–2)
DEPRECATED RDW RBC AUTO: 65.5 FL (ref 37–54)
EOSINOPHIL # BLD AUTO: 0.21 10*3/MM3 (ref 0–0.4)
EOSINOPHIL NFR BLD AUTO: 2.7 % (ref 0.3–6.2)
ERYTHROCYTE [DISTWIDTH] IN BLOOD BY AUTOMATED COUNT: 21.3 % (ref 12.3–15.4)
GFR SERPL CREATININE-BSD FRML MDRD: 9 ML/MIN/1.73
GFR SERPL CREATININE-BSD FRML MDRD: ABNORMAL ML/MIN/{1.73_M2}
GLOBULIN UR ELPH-MCNC: 3 GM/DL
GLUCOSE BLD-MCNC: 185 MG/DL (ref 65–99)
GLUCOSE UR STRIP-MCNC: ABNORMAL MG/DL
HCT VFR BLD AUTO: 31.1 % (ref 34–46.6)
HGB BLD-MCNC: 9.4 G/DL (ref 12–15.9)
HGB UR QL STRIP.AUTO: ABNORMAL
HYALINE CASTS UR QL AUTO: ABNORMAL /LPF
IMM GRANULOCYTES # BLD AUTO: 0.05 10*3/MM3 (ref 0–0.05)
IMM GRANULOCYTES NFR BLD AUTO: 0.7 % (ref 0–0.5)
KETONES UR QL STRIP: NEGATIVE
LEUKOCYTE ESTERASE UR QL STRIP.AUTO: ABNORMAL
LYMPHOCYTES # BLD AUTO: 0.86 10*3/MM3 (ref 0.7–3.1)
LYMPHOCYTES NFR BLD AUTO: 11.3 % (ref 19.6–45.3)
MAGNESIUM SERPL-MCNC: 2.4 MG/DL (ref 1.6–2.4)
MCH RBC QN AUTO: 27.2 PG (ref 26.6–33)
MCHC RBC AUTO-ENTMCNC: 30.2 G/DL (ref 31.5–35.7)
MCV RBC AUTO: 89.9 FL (ref 79–97)
MONOCYTES # BLD AUTO: 0.3 10*3/MM3 (ref 0.1–0.9)
MONOCYTES NFR BLD AUTO: 3.9 % (ref 5–12)
NEUTROPHILS # BLD AUTO: 6.2 10*3/MM3 (ref 1.7–7)
NEUTROPHILS NFR BLD AUTO: 81.1 % (ref 42.7–76)
NITRITE UR QL STRIP: NEGATIVE
NRBC BLD AUTO-RTO: 0.4 /100 WBC (ref 0–0.2)
PH UR STRIP.AUTO: 7 [PH] (ref 5–8)
PLATELET # BLD AUTO: 318 10*3/MM3 (ref 140–450)
PMV BLD AUTO: 8.9 FL (ref 6–12)
POTASSIUM BLD-SCNC: 5.3 MMOL/L (ref 3.5–5.2)
PROCALCITONIN SERPL-MCNC: 0.39 NG/ML (ref 0.1–0.25)
PROT SERPL-MCNC: 6.8 G/DL (ref 6–8.5)
PROT UR QL STRIP: ABNORMAL
RBC # BLD AUTO: 3.46 10*6/MM3 (ref 3.77–5.28)
RBC # UR: ABNORMAL /HPF
REF LAB TEST METHOD: ABNORMAL
SODIUM BLD-SCNC: 124 MMOL/L (ref 136–145)
SP GR UR STRIP: 1.02 (ref 1–1.03)
SQUAMOUS #/AREA URNS HPF: ABNORMAL /HPF
UROBILINOGEN UR QL STRIP: ABNORMAL
WBC NRBC COR # BLD: 7.64 10*3/MM3 (ref 3.4–10.8)
WBC UR QL AUTO: ABNORMAL /HPF

## 2020-04-30 PROCEDURE — 25010000002 PROMETHAZINE PER 50 MG: Performed by: PHYSICIAN ASSISTANT

## 2020-04-30 PROCEDURE — 96365 THER/PROPH/DIAG IV INF INIT: CPT

## 2020-04-30 PROCEDURE — 83735 ASSAY OF MAGNESIUM: CPT | Performed by: PHYSICIAN ASSISTANT

## 2020-04-30 PROCEDURE — 99284 EMERGENCY DEPT VISIT MOD MDM: CPT

## 2020-04-30 PROCEDURE — 87040 BLOOD CULTURE FOR BACTERIA: CPT | Performed by: PHYSICIAN ASSISTANT

## 2020-04-30 PROCEDURE — 87086 URINE CULTURE/COLONY COUNT: CPT | Performed by: NURSE PRACTITIONER

## 2020-04-30 PROCEDURE — 83605 ASSAY OF LACTIC ACID: CPT | Performed by: PHYSICIAN ASSISTANT

## 2020-04-30 PROCEDURE — 85025 COMPLETE CBC W/AUTO DIFF WBC: CPT | Performed by: PHYSICIAN ASSISTANT

## 2020-04-30 PROCEDURE — 80053 COMPREHEN METABOLIC PANEL: CPT | Performed by: PHYSICIAN ASSISTANT

## 2020-04-30 PROCEDURE — 25010000002 ONDANSETRON PER 1 MG: Performed by: PHYSICIAN ASSISTANT

## 2020-04-30 PROCEDURE — 25010000002 VANCOMYCIN: Performed by: PHYSICIAN ASSISTANT

## 2020-04-30 PROCEDURE — 81001 URINALYSIS AUTO W/SCOPE: CPT | Performed by: PHYSICIAN ASSISTANT

## 2020-04-30 PROCEDURE — G0378 HOSPITAL OBSERVATION PER HR: HCPCS

## 2020-04-30 PROCEDURE — 71045 X-RAY EXAM CHEST 1 VIEW: CPT

## 2020-04-30 PROCEDURE — 84145 PROCALCITONIN (PCT): CPT | Performed by: PHYSICIAN ASSISTANT

## 2020-04-30 PROCEDURE — 96375 TX/PRO/DX INJ NEW DRUG ADDON: CPT

## 2020-04-30 RX ORDER — ONDANSETRON 2 MG/ML
4 INJECTION INTRAMUSCULAR; INTRAVENOUS EVERY 6 HOURS PRN
Status: DISCONTINUED | OUTPATIENT
Start: 2020-04-30 | End: 2020-05-02 | Stop reason: HOSPADM

## 2020-04-30 RX ORDER — PROMETHAZINE HYDROCHLORIDE 25 MG/ML
12.5 INJECTION, SOLUTION INTRAMUSCULAR; INTRAVENOUS ONCE
Status: COMPLETED | OUTPATIENT
Start: 2020-04-30 | End: 2020-04-30

## 2020-04-30 RX ORDER — NICOTINE POLACRILEX 4 MG
15 LOZENGE BUCCAL
Status: DISCONTINUED | OUTPATIENT
Start: 2020-04-30 | End: 2020-05-02 | Stop reason: HOSPADM

## 2020-04-30 RX ORDER — ONDANSETRON 4 MG/1
4 TABLET, FILM COATED ORAL EVERY 6 HOURS PRN
Status: DISCONTINUED | OUTPATIENT
Start: 2020-04-30 | End: 2020-05-02 | Stop reason: HOSPADM

## 2020-04-30 RX ORDER — ONDANSETRON 2 MG/ML
4 INJECTION INTRAMUSCULAR; INTRAVENOUS ONCE
Status: COMPLETED | OUTPATIENT
Start: 2020-04-30 | End: 2020-04-30

## 2020-04-30 RX ORDER — SODIUM CHLORIDE 0.9 % (FLUSH) 0.9 %
10 SYRINGE (ML) INJECTION AS NEEDED
Status: DISCONTINUED | OUTPATIENT
Start: 2020-04-30 | End: 2020-05-02 | Stop reason: HOSPADM

## 2020-04-30 RX ORDER — ACETAMINOPHEN 650 MG/1
650 SUPPOSITORY RECTAL EVERY 4 HOURS PRN
Status: DISCONTINUED | OUTPATIENT
Start: 2020-04-30 | End: 2020-05-02 | Stop reason: HOSPADM

## 2020-04-30 RX ORDER — ACETAMINOPHEN 160 MG/5ML
650 SOLUTION ORAL EVERY 4 HOURS PRN
Status: DISCONTINUED | OUTPATIENT
Start: 2020-04-30 | End: 2020-05-02 | Stop reason: HOSPADM

## 2020-04-30 RX ORDER — SODIUM CHLORIDE 0.9 % (FLUSH) 0.9 %
10 SYRINGE (ML) INJECTION EVERY 12 HOURS SCHEDULED
Status: DISCONTINUED | OUTPATIENT
Start: 2020-05-01 | End: 2020-05-02 | Stop reason: HOSPADM

## 2020-04-30 RX ORDER — ACETAMINOPHEN 325 MG/1
650 TABLET ORAL EVERY 4 HOURS PRN
Status: DISCONTINUED | OUTPATIENT
Start: 2020-04-30 | End: 2020-05-02 | Stop reason: HOSPADM

## 2020-04-30 RX ORDER — DEXTROSE MONOHYDRATE 25 G/50ML
25 INJECTION, SOLUTION INTRAVENOUS
Status: DISCONTINUED | OUTPATIENT
Start: 2020-04-30 | End: 2020-05-02 | Stop reason: HOSPADM

## 2020-04-30 RX ADMIN — PROMETHAZINE HYDROCHLORIDE 12.5 MG: 25 INJECTION INTRAMUSCULAR; INTRAVENOUS at 22:39

## 2020-04-30 RX ADMIN — ONDANSETRON HYDROCHLORIDE 4 MG: 2 SOLUTION INTRAMUSCULAR; INTRAVENOUS at 19:50

## 2020-04-30 RX ADMIN — VANCOMYCIN HYDROCHLORIDE 1250 MG: 10 INJECTION, POWDER, LYOPHILIZED, FOR SOLUTION INTRAVENOUS at 22:40

## 2020-04-30 NOTE — OUTREACH NOTE
Medical Week 1 Survey      Responses   [facility] patient discharged from?  [place]   COVID-19 Test Status  Negative   Does the patient have one of the following disease processes/diagnoses(primary or secondary)?  Other   Is there a successful TCM telephone encounter documented?  No   Week 1 attempt successful?  No   Unsuccessful attempts  Attempt 1          Soledad Sandhu RN

## 2020-05-01 ENCOUNTER — TELEPHONE (OUTPATIENT)
Dept: INTERNAL MEDICINE | Age: 60
End: 2020-05-01

## 2020-05-01 ENCOUNTER — READMISSION MANAGEMENT (OUTPATIENT)
Dept: CALL CENTER | Facility: HOSPITAL | Age: 60
End: 2020-05-01

## 2020-05-01 LAB
ALBUMIN SERPL-MCNC: 3.5 G/DL (ref 3.5–5.2)
ALBUMIN/GLOB SERPL: 1.2 G/DL
ALP SERPL-CCNC: 282 U/L (ref 39–117)
ALT SERPL W P-5'-P-CCNC: 25 U/L (ref 1–33)
ANION GAP SERPL CALCULATED.3IONS-SCNC: 16 MMOL/L (ref 5–15)
AST SERPL-CCNC: 27 U/L (ref 1–32)
BASOPHILS # BLD AUTO: 0.02 10*3/MM3 (ref 0–0.2)
BASOPHILS NFR BLD AUTO: 0.3 % (ref 0–1.5)
BILIRUB SERPL-MCNC: 0.3 MG/DL (ref 0.2–1.2)
BUN BLD-MCNC: 28 MG/DL (ref 8–23)
BUN/CREAT SERPL: 5.2 (ref 7–25)
CALCIUM SPEC-SCNC: 9.2 MG/DL (ref 8.6–10.5)
CHLORIDE SERPL-SCNC: 86 MMOL/L (ref 98–107)
CO2 SERPL-SCNC: 23 MMOL/L (ref 22–29)
CREAT BLD-MCNC: 5.37 MG/DL (ref 0.57–1)
DEPRECATED RDW RBC AUTO: 65.5 FL (ref 37–54)
EOSINOPHIL # BLD AUTO: 0.28 10*3/MM3 (ref 0–0.4)
EOSINOPHIL NFR BLD AUTO: 4.7 % (ref 0.3–6.2)
ERYTHROCYTE [DISTWIDTH] IN BLOOD BY AUTOMATED COUNT: 21.2 % (ref 12.3–15.4)
GFR SERPL CREATININE-BSD FRML MDRD: 8 ML/MIN/1.73
GFR SERPL CREATININE-BSD FRML MDRD: ABNORMAL ML/MIN/{1.73_M2}
GLOBULIN UR ELPH-MCNC: 2.9 GM/DL
GLUCOSE BLD-MCNC: 138 MG/DL (ref 65–99)
GLUCOSE BLDC GLUCOMTR-MCNC: 221 MG/DL (ref 70–130)
GLUCOSE BLDC GLUCOMTR-MCNC: 96 MG/DL (ref 70–130)
HCT VFR BLD AUTO: 29.7 % (ref 34–46.6)
HGB BLD-MCNC: 9 G/DL (ref 12–15.9)
IMM GRANULOCYTES # BLD AUTO: 0.03 10*3/MM3 (ref 0–0.05)
IMM GRANULOCYTES NFR BLD AUTO: 0.5 % (ref 0–0.5)
LYMPHOCYTES # BLD AUTO: 0.87 10*3/MM3 (ref 0.7–3.1)
LYMPHOCYTES NFR BLD AUTO: 14.7 % (ref 19.6–45.3)
MCH RBC QN AUTO: 27.3 PG (ref 26.6–33)
MCHC RBC AUTO-ENTMCNC: 30.3 G/DL (ref 31.5–35.7)
MCV RBC AUTO: 90 FL (ref 79–97)
MONOCYTES # BLD AUTO: 0.26 10*3/MM3 (ref 0.1–0.9)
MONOCYTES NFR BLD AUTO: 4.4 % (ref 5–12)
NEUTROPHILS # BLD AUTO: 4.46 10*3/MM3 (ref 1.7–7)
NEUTROPHILS NFR BLD AUTO: 75.4 % (ref 42.7–76)
NRBC BLD AUTO-RTO: 0.5 /100 WBC (ref 0–0.2)
PLATELET # BLD AUTO: 306 10*3/MM3 (ref 140–450)
PMV BLD AUTO: 8.9 FL (ref 6–12)
POTASSIUM BLD-SCNC: 5.2 MMOL/L (ref 3.5–5.2)
PROT SERPL-MCNC: 6.4 G/DL (ref 6–8.5)
RBC # BLD AUTO: 3.3 10*6/MM3 (ref 3.77–5.28)
SODIUM BLD-SCNC: 125 MMOL/L (ref 136–145)
WBC NRBC COR # BLD: 5.92 10*3/MM3 (ref 3.4–10.8)

## 2020-05-01 PROCEDURE — 82962 GLUCOSE BLOOD TEST: CPT

## 2020-05-01 PROCEDURE — G0378 HOSPITAL OBSERVATION PER HR: HCPCS

## 2020-05-01 PROCEDURE — 94799 UNLISTED PULMONARY SVC/PX: CPT

## 2020-05-01 PROCEDURE — 96366 THER/PROPH/DIAG IV INF ADDON: CPT

## 2020-05-01 PROCEDURE — 25010000002 PIPERACILLIN SOD-TAZOBACTAM PER 1 G: Performed by: INTERNAL MEDICINE

## 2020-05-01 PROCEDURE — G0257 UNSCHED DIALYSIS ESRD PT HOS: HCPCS

## 2020-05-01 PROCEDURE — 97165 OT EVAL LOW COMPLEX 30 MIN: CPT

## 2020-05-01 PROCEDURE — 96372 THER/PROPH/DIAG INJ SC/IM: CPT

## 2020-05-01 PROCEDURE — 85025 COMPLETE CBC W/AUTO DIFF WBC: CPT | Performed by: NURSE PRACTITIONER

## 2020-05-01 PROCEDURE — 25010000002 EPOETIN ALFA-EPBX 10000 UNIT/ML SOLUTION: Performed by: FAMILY MEDICINE

## 2020-05-01 PROCEDURE — 96367 TX/PROPH/DG ADDL SEQ IV INF: CPT

## 2020-05-01 PROCEDURE — 80053 COMPREHEN METABOLIC PANEL: CPT | Performed by: NURSE PRACTITIONER

## 2020-05-01 PROCEDURE — 63710000001 INSULIN LISPRO (HUMAN) PER 5 UNITS: Performed by: NURSE PRACTITIONER

## 2020-05-01 PROCEDURE — 25010000002 CEFEPIME PER 500 MG: Performed by: FAMILY MEDICINE

## 2020-05-01 RX ORDER — ALBUMIN (HUMAN) 12.5 G/50ML
12.5 SOLUTION INTRAVENOUS AS NEEDED
Status: DISCONTINUED | OUTPATIENT
Start: 2020-05-01 | End: 2020-05-02 | Stop reason: HOSPADM

## 2020-05-01 RX ORDER — L.ACID,PARA/B.BIFIDUM/S.THERM 8B CELL
1 CAPSULE ORAL DAILY
Status: DISCONTINUED | OUTPATIENT
Start: 2020-05-01 | End: 2020-05-02 | Stop reason: HOSPADM

## 2020-05-01 RX ORDER — FAMOTIDINE 20 MG/1
20 TABLET, FILM COATED ORAL 2 TIMES DAILY
Status: DISCONTINUED | OUTPATIENT
Start: 2020-05-01 | End: 2020-05-01

## 2020-05-01 RX ORDER — DORZOLAMIDE HCL 20 MG/ML
1 SOLUTION/ DROPS OPHTHALMIC 2 TIMES DAILY
Status: DISCONTINUED | OUTPATIENT
Start: 2020-05-01 | End: 2020-05-02 | Stop reason: HOSPADM

## 2020-05-01 RX ORDER — LEVOTHYROXINE SODIUM 0.03 MG/1
25 TABLET ORAL DAILY
Status: DISCONTINUED | OUTPATIENT
Start: 2020-05-01 | End: 2020-05-02 | Stop reason: HOSPADM

## 2020-05-01 RX ORDER — CARVEDILOL 6.25 MG/1
6.25 TABLET ORAL 2 TIMES DAILY WITH MEALS
COMMUNITY
End: 2020-05-02 | Stop reason: HOSPADM

## 2020-05-01 RX ORDER — FLUTICASONE PROPIONATE 50 MCG
2 SPRAY, SUSPENSION (ML) NASAL 2 TIMES DAILY
Status: DISCONTINUED | OUTPATIENT
Start: 2020-05-01 | End: 2020-05-02 | Stop reason: HOSPADM

## 2020-05-01 RX ORDER — LOSARTAN POTASSIUM 50 MG/1
50 TABLET ORAL 2 TIMES DAILY
Status: DISCONTINUED | OUTPATIENT
Start: 2020-05-01 | End: 2020-05-02 | Stop reason: HOSPADM

## 2020-05-01 RX ORDER — SEVELAMER CARBONATE 800 MG/1
2400 TABLET, FILM COATED ORAL
Status: DISCONTINUED | OUTPATIENT
Start: 2020-05-01 | End: 2020-05-02 | Stop reason: HOSPADM

## 2020-05-01 RX ORDER — PRAVASTATIN SODIUM 20 MG
10 TABLET ORAL NIGHTLY
Status: DISCONTINUED | OUTPATIENT
Start: 2020-05-01 | End: 2020-05-02 | Stop reason: HOSPADM

## 2020-05-01 RX ORDER — FAMOTIDINE 20 MG/1
20 TABLET, FILM COATED ORAL DAILY
Status: DISCONTINUED | OUTPATIENT
Start: 2020-05-02 | End: 2020-05-02 | Stop reason: HOSPADM

## 2020-05-01 RX ORDER — NICOTINE 21 MG/24HR
1 PATCH, TRANSDERMAL 24 HOURS TRANSDERMAL EVERY 24 HOURS
Status: DISCONTINUED | OUTPATIENT
Start: 2020-05-01 | End: 2020-05-02 | Stop reason: HOSPADM

## 2020-05-01 RX ORDER — TIMOLOL MALEATE 5 MG/ML
1 SOLUTION/ DROPS OPHTHALMIC EVERY MORNING
Status: DISCONTINUED | OUTPATIENT
Start: 2020-05-01 | End: 2020-05-02 | Stop reason: HOSPADM

## 2020-05-01 RX ORDER — BRIMONIDINE TARTRATE 2 MG/ML
1 SOLUTION/ DROPS OPHTHALMIC 3 TIMES DAILY
Status: DISCONTINUED | OUTPATIENT
Start: 2020-05-01 | End: 2020-05-02 | Stop reason: HOSPADM

## 2020-05-01 RX ORDER — ASPIRIN 81 MG/1
81 TABLET ORAL DAILY
Status: DISCONTINUED | OUTPATIENT
Start: 2020-05-01 | End: 2020-05-02 | Stop reason: HOSPADM

## 2020-05-01 RX ORDER — CARVEDILOL 6.25 MG/1
12.5 TABLET ORAL 2 TIMES DAILY WITH MEALS
Status: DISCONTINUED | OUTPATIENT
Start: 2020-05-01 | End: 2020-05-02 | Stop reason: HOSPADM

## 2020-05-01 RX ADMIN — TIMOLOL MALEATE 1 DROP: 5 SOLUTION/ DROPS OPHTHALMIC at 05:02

## 2020-05-01 RX ADMIN — CARVEDILOL 12.5 MG: 6.25 TABLET, FILM COATED ORAL at 11:54

## 2020-05-01 RX ADMIN — SODIUM CHLORIDE, PRESERVATIVE FREE 10 ML: 5 INJECTION INTRAVENOUS at 22:04

## 2020-05-01 RX ADMIN — BRIMONIDINE TARTRATE 1 DROP: 2 SOLUTION OPHTHALMIC at 17:24

## 2020-05-01 RX ADMIN — EPOETIN ALFA-EPBX 5000 UNITS: 10000 INJECTION, SOLUTION INTRAVENOUS; SUBCUTANEOUS at 12:31

## 2020-05-01 RX ADMIN — DORZOLAMIDE HYDROCHLORIDE 1 DROP: 20 SOLUTION/ DROPS OPHTHALMIC at 00:51

## 2020-05-01 RX ADMIN — SODIUM CHLORIDE, PRESERVATIVE FREE 10 ML: 5 INJECTION INTRAVENOUS at 00:53

## 2020-05-01 RX ADMIN — FLUTICASONE PROPIONATE 2 SPRAY: 50 SPRAY, METERED NASAL at 00:52

## 2020-05-01 RX ADMIN — LEVOTHYROXINE SODIUM 25 MCG: 25 TABLET ORAL at 05:02

## 2020-05-01 RX ADMIN — SEVELAMER CARBONATE 2400 MG: 800 TABLET, FILM COATED ORAL at 17:25

## 2020-05-01 RX ADMIN — NICOTINE 1 PATCH: 21 PATCH, EXTENDED RELEASE TRANSDERMAL at 22:33

## 2020-05-01 RX ADMIN — DORZOLAMIDE HYDROCHLORIDE 1 DROP: 20 SOLUTION/ DROPS OPHTHALMIC at 11:57

## 2020-05-01 RX ADMIN — ASPIRIN 81 MG: 81 TABLET ORAL at 11:54

## 2020-05-01 RX ADMIN — LOSARTAN POTASSIUM 50 MG: 50 TABLET, FILM COATED ORAL at 11:55

## 2020-05-01 RX ADMIN — PRAVASTATIN SODIUM 10 MG: 20 TABLET ORAL at 00:52

## 2020-05-01 RX ADMIN — CEFEPIME HYDROCHLORIDE 1 G: 1 INJECTION, POWDER, FOR SOLUTION INTRAMUSCULAR; INTRAVENOUS at 22:06

## 2020-05-01 RX ADMIN — BRIMONIDINE TARTRATE 1 DROP: 2 SOLUTION OPHTHALMIC at 22:02

## 2020-05-01 RX ADMIN — NICOTINE 1 PATCH: 21 PATCH, EXTENDED RELEASE TRANSDERMAL at 00:51

## 2020-05-01 RX ADMIN — LOSARTAN POTASSIUM 50 MG: 50 TABLET, FILM COATED ORAL at 22:03

## 2020-05-01 RX ADMIN — DORZOLAMIDE HYDROCHLORIDE 1 DROP: 20 SOLUTION/ DROPS OPHTHALMIC at 22:02

## 2020-05-01 RX ADMIN — ACETAMINOPHEN 650 MG: 325 TABLET, FILM COATED ORAL at 03:34

## 2020-05-01 RX ADMIN — FAMOTIDINE 20 MG: 20 TABLET, FILM COATED ORAL at 00:52

## 2020-05-01 RX ADMIN — LOSARTAN POTASSIUM 50 MG: 50 TABLET, FILM COATED ORAL at 00:52

## 2020-05-01 RX ADMIN — PRAVASTATIN SODIUM 10 MG: 20 TABLET ORAL at 22:03

## 2020-05-01 RX ADMIN — BRIMONIDINE TARTRATE 1 DROP: 2 SOLUTION OPHTHALMIC at 11:57

## 2020-05-01 RX ADMIN — FLUTICASONE PROPIONATE 2 SPRAY: 50 SPRAY, METERED NASAL at 22:04

## 2020-05-01 RX ADMIN — SERTRALINE 25 MG: 50 TABLET, FILM COATED ORAL at 11:53

## 2020-05-01 RX ADMIN — SODIUM CHLORIDE, PRESERVATIVE FREE 10 ML: 5 INJECTION INTRAVENOUS at 11:56

## 2020-05-01 RX ADMIN — CEFEPIME HYDROCHLORIDE 1 G: 1 INJECTION, POWDER, FOR SOLUTION INTRAMUSCULAR; INTRAVENOUS at 11:58

## 2020-05-01 RX ADMIN — SEVELAMER CARBONATE 2400 MG: 800 TABLET, FILM COATED ORAL at 11:54

## 2020-05-01 RX ADMIN — TAZOBACTAM SODIUM AND PIPERACILLIN SODIUM 3.38 G: 375; 3 INJECTION, SOLUTION INTRAVENOUS at 00:52

## 2020-05-01 RX ADMIN — INSULIN LISPRO 3 UNITS: 100 INJECTION, SOLUTION INTRAVENOUS; SUBCUTANEOUS at 17:25

## 2020-05-01 RX ADMIN — CARVEDILOL 12.5 MG: 6.25 TABLET, FILM COATED ORAL at 17:24

## 2020-05-01 RX ADMIN — Medication 1 CAPSULE: at 11:53

## 2020-05-01 NOTE — PROGRESS NOTES
Pharmacy Dosing Service  Antimicrobials  Vancomycin and Zosyn    Assessment/Action/Plan:    ESRD/HD patient on a T/Th/Sat schedule but is reportedly non-compliant. Patient has been admitted 5 times since January and was last discharged on 4/29/20.    Zosyn: Initiated Zosyn 3.375 gm IVPB (over 30 minutes) followed by Zosyn 3.375 gm IVPB (over 4 hours) Q12h.     Vancomycin: Vancomycin 1250 mg IVPB received in ER @ 2240 on 4/30/20. Will defer vancomycin dosing until patient receives HD or HD is scheduled.     Current end date: 5/8/20    Thank you for the consults.      Subjective:  Mini Gentile is a 60 y.o. female currently being treated for pneumonia.    Objective:  Estimated Creatinine Clearance: 10.1 mL/min (A) (by C-G formula based on SCr of 5.02 mg/dL (H)).   Lab Results   Component Value Date    CREATININE 5.02 (H) 04/30/2020    CREATININE 4.07 (H) 04/29/2020    CREATININE 7.34 (H) 04/28/2020   Lab Results   Component Value Date    WBC 7.64 04/30/2020     Temp Readings from Last 1 Encounters:   04/30/20 97.1 °F (36.2 °C) (Oral)       Culture Results:  Microbiology Results (last 10 days)       Procedure Component Value - Date/Time    SARS-COV-2 PCR ([place] IN-HOUSE PERFORMED), NP SWAB IN TRANSPORT MEDIA - Swab, Nasopharynx [[id number]]  (Normal) Collected:  04/27/20 2252    Lab Status:  Final result Specimen:  Swab from Nasopharynx Updated:  04/28/20 1200     COVID19 Not Detected    Rapid Strep A Screen - Swab, Throat [[id number]]  (Normal) Collected:  04/27/20 2250    Lab Status:  Final result Specimen:  Swab from Throat Updated:  04/27/20 2314     Strep A Ag Negative    Beta Strep Culture, Throat - Swab, Throat [[id number]]  (Normal) Collected:  04/27/20 2250    Lab Status:  Final result Specimen:  Swab from Throat Updated:  04/30/20 0723     Throat Culture, Beta Strep No Beta Hemolytic Streptococcus Isolated    Narrative:       Group A Strep incidence is low in adults. Positive culture for Beta  hemolytic Streptococcus species can reflect colonization and not true infection. Please correlate clinically.          Current Antimicrobials:   Anti-Infectives (From admission, onward)      Ordered     Dose/Rate Route Frequency Start Stop    05/01/20 0015  piperacillin-tazobactam (ZOSYN) 3.375 g in iso-osmotic dextrose 50 ml (premix)     Ordering Provider:  Federico Clancy DO    3.375 g  over 4 Hours Intravenous Every 12 Hours 05/01/20 0800 05/08/20 0759    05/01/20 0015  piperacillin-tazobactam (ZOSYN) 3.375 g in iso-osmotic dextrose 50 ml (premix)     Ordering Provider:  Federico Clancy DO    3.375 g  over 30 Minutes Intravenous Once 05/01/20 0115      05/01/20 0009  Pharmacy to dose vancomycin     Ordering Provider:  Catina Villegas APRN     Does not apply Continuous PRN 05/01/20 0008 05/08/20 0007    05/01/20 0009  Pharmacy to Dose Zosyn     Ordering Provider:  Catina Villegas APRN     Does not apply Continuous PRN 05/01/20 0008 05/08/20 0007    04/30/20 2150  vancomycin 1250 mg/250 mL 0.9% NS IVPB ([facility])     Ordering Provider:  Rodrigo Parker PA-C    20 mg/kg × 64.9 kg  over 60 Minutes Intravenous Once 04/30/20 2152 04/30/20 2340            Leslie Underwood, PharmROBERTH  05/01/20 00:15

## 2020-05-01 NOTE — PROGRESS NOTES
"Pharmacy Dosing Service  Automatic Renal Adjustment  Pepcid    Assessment/Action/Plan:  Based on prescribing information provided by the drug , Pepcid 20 mg PO every 12 hours, has been changed to Pepcid 20 mg PO every 24 hours. Pharmacy will continue to monitor daily and make further adjustment(s) accordingly.     Subjective:  Mini Gentile is a 60 y.o. female     Additional Factors Considered:  Patient disposition per documentation  Disease state or condition being treated    Objective:  Ht: 147.3 cm (58\"); Wt: 66.4 kg (146 lb 6.4 oz)  Estimated Creatinine Clearance: 9.4 mL/min (A) (by C-G formula based on SCr of 5.37 mg/dL (H)).   HD     Lab Results   Component Value Date    CREATININE 5.37 (H) 05/01/2020    CREATININE 5.02 (H) 04/30/2020    CREATININE 4.07 (H) 04/29/2020    CREATININE 6.6 (H) 08/09/2019    CREATININE 4.5 (H) 09/10/2018    CREATININE 3.4 (H) 07/11/2018       MICHAEL Shepherd, PharmROBERTH  05/01/20 10:20    "

## 2020-05-01 NOTE — PROGRESS NOTES
RT Nebulizer Protocol    Assessment tool to be used for patients with existing breathing treatments ordered by hospitalists                                                                  0  1  2  3  4      Respiratory History   No Smoking   x   Smoking History      1 Pack/Day      Pulmonary Disease      Exacerbation        Respiratory Rate   Normal   x   20-25      Dyspneic      Accessory Muscles      Severe Dyspnea        Breath Sounds   Clear   x   Crackles      Crackles/ Rhonchi      Wheezing      Absent/ Severe Wheezing        Chest   X-ray   Clear      1 Lobe Infiltration/ Consolidation/ PE      2 Lobe Same Lung Infiltration/ Consolidation/ PE       2 Lobe Infiltration/ Both Lungs/ Consolidation/ PE   x   Both Lungs/ More Than 1 Lobe/ Atelectasis/ Consolidation/  PE        Cough   Strong Non- Productive   x   Excessive Secretions/ Strong Cough      Excessive Secretions/ Weak Cough      Thick Bronchial Secretions/ Weak Cough      Thick Bronchial Secretions/ No Cough        Total Patient Score = 3  0-4=Q4 PRN  5-9=TID and Q4 PRN  10-14=QID and Q3 PRN  15-19=Q4 and Q2 PRN  20=Q3 and Q2 PRN    Bronchopulmonary Hygiene (CPT)   Q4 ATC Copious secretions, dyspnea, unable to sleep, mucus plug    QID & Q4 PRN Moderate secretions    TID Small amounts of secretions w/ poor cough and history of secretions    BID Unable to deep breathe and cough spontaneously       Lung Expansion Therapy (PEP)   Q4 & PRN at night Severe atelectasis, poor oxygenation    QID  High risk for persistent atelectasis, existence of same    TID At risk for developing atelectasis    BID Unable to deep breathe and cough spontaneously     Instruct, 1 follow up Patients able to perform well on their own          proventil and atrovent contraindicated with medication patient currently taking

## 2020-05-01 NOTE — H&P
[facility] Medicine Services  HISTORY AND PHYSICAL    Date of Admission: 4/30/2020  Primary Care Physician: Bharati Dahl APRN    Subjective     Chief Complaint: diarrhea, weakness, N/V, missed HD today    History of Present Illness  Mini Gentile is a 59-year-old female with a past medical history of end-stage kidney disease on hemodialysis -multiple documentation notes regarding noncompliance with dialysis; insulin-dependent diabetes, glaucoma left, hypertension, hypothyroidism, near blindness, tobacco abuse.  Patient has orders for hemodialysis Tuesday/Thursday/Saturday.  This is patient's fifth admission since 1/19/2020, having again missed dialysis today due to nausea/vomiting/abdominal pain/diarrhea patient states pain has resolved.  Patient was discharged from this facility yesterday.  ER work-up reveals abnormal chemistry, urinalysis was obtained which is also abnormal however will await urine culture results before continuing antibiotic therapy.  Chest x-ray reveals left lower lobe consolidation with concerns for healthcare associated pneumonia.  Patient denies fever, worsening shortness of breath (chronic), fever, patient still smokes 1.5 packs of cigarettes per day. Patient reports she is just too sick to go to dialysis.  I did discuss possible need for nursing home placement as due to chronic illnesses and inability to care for self this may be her best option to improve.  Currently she is stating this will not happen however I feel Adult Protective Services may need to be consulted for assessment at this time.  Patient is admitted as observation for further evaluation treatment.    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    • Chronic otitis media    • Diabetes (CMS/[facility])    •  End stage renal disease on dialysis (CMS/[facility])    • Eustachian tube dysfunction    • Glaucoma    • HTN (hypertension)    • Mucoid otitis media    • Noncompliance of patient with renal dialysis (CMS/[facility])    • Tobacco abuse        Past Surgical History:   Past Surgical History:   Procedure Laterality Date   • ARTERIOVENOUS FISTULA     • CATARACT EXTRACTION WITH INTRAOCULAR LENS IMPLANT     •  SECTION     • CHOLECYSTECTOMY     • MYRINGOTOMY W/ TUBES Bilateral    • MYRINGOTOMY W/ TUBES Right    • TUBAL ABDOMINAL LIGATION         Family History: family history includes Diabetes in her father; Heart disease in her mother.    Social History:  reports that she has been smoking cigarettes. She has a 12.00 pack-year smoking history. She has never used smokeless tobacco. She reports that she does not drink alcohol or use drugs.    Code Status: Full, if unable speak for self Sundeeplakia Bowling will speak for her      Allergies:  Allergies   Allergen Reactions   • Bupropion Nausea Only   • Chantix [Varenicline]    • Sulfa Antibiotics    • Azithromycin Rash   • Levofloxacin Rash   • Penicillins Rash       Medications:  Prior to Admission medications    Medication Sig Start Date End Date Taking? Authorizing Provider   albuterol sulfate  (90 Base) MCG/ACT inhaler Inhale 2 puffs Every 4 (Four) Hours As Needed for Wheezing. 20  Yes Sundeep Aldridge MD   aspirin 81 MG EC tablet Take 81 mg by mouth Daily.   Yes Steve Warren MD   bismuth subsalicylate (PEPTO BISMOL) 262 MG chewable tablet Chew 262 mg Daily As Needed for Indigestion.   Yes Steve Warren MD   brimonidine (ALPHAGAN) 0.2 % ophthalmic solution Administer 1 drop into the left eye 3 (Three) Times a Day.   Yes Steve Warren MD   calcitriol (ROCALTROL) 0.25 MCG capsule Take 0.25 mcg by mouth Take As Directed. Only given on dialysis days (Tuesday, Thursday, Saturday)   Yes Steve Warren MD   carvedilol (COREG) 12.5 MG tablet  Take 1 tablet by mouth 2 (Two) Times a Day With Meals. 1/21/20  Yes Sundeep Aldridge MD   cholecalciferol (VITAMIN D3) 1000 units tablet Take 2,000 Units by mouth Daily.   Yes Steve Warren MD   dorzolamide (TRUSOPT) 2 % ophthalmic solution Administer 1 drop into the left eye 2 (Two) Times a Day.   Yes Steve Warren MD   famotidine (PEPCID) 20 MG tablet Take 1 tablet by mouth 2 (Two) Times a Day. 4/29/20  Yes Lyndon Lockett MD   fluticasone (FLONASE) 50 MCG/ACT nasal spray 2 sprays into the nostril(s) as directed by provider 2 (Two) Times a Day.   Yes Steve Warren MD   homatropine 5 % ophthalmic solution Administer 1 drop into the left eye Daily.   Yes Steve Warren MD   insulin glargine (LANTUS) 100 UNIT/ML injection Inject 10 Units under the skin Every Night.   Yes Steve Warren MD   Latanoprostene Bunod (Vyzulta) 0.024 % solution Apply 1 drop to eye(s) as directed by provider Every Night. Left eye   Yes Steve Warren MD   levothyroxine (SYNTHROID, LEVOTHROID) 25 MCG tablet Take 1 tablet by mouth Daily. 2/9/20  Yes Lyndon Lockett MD   losartan (COZAAR) 50 MG tablet Take 50 mg by mouth 2 (Two) Times a Day.   Yes Steve Warren MD   Netarsudil Dimesylate (RHOPRESSA) 0.02 % solution Administer 1 drop into the left eye Every Night.   Yes Steve Warren MD   nicotine (NICODERM CQ) 21 MG/24HR patch Place 1 patch on the skin as directed by provider Daily. 4/30/20  Yes Lyndon Lockett MD   ondansetron (ZOFRAN) 4 MG tablet Take 1 tablet by mouth Every 6 (Six) Hours As Needed for Nausea or Vomiting. 4/29/20  Yes Lyndon Lockett MD   pravastatin (PRAVACHOL) 10 MG tablet Take 10 mg by mouth Every Night.   Yes Steve Warren MD   sertraline (ZOLOFT) 25 MG tablet Take 25 mg by mouth Daily.   Yes Steve Warren MD   sevelamer (RENAGEL) 800 MG tablet Take 2,400 mg by mouth 3 (Three) Times a Day With Meals.   Yes Provider, MD Steve    "  timolol (TIMOPTIC) 0.5 % ophthalmic solution Administer 1 drop into the left eye Every Morning.   Yes Provider, MD Steve       Objective     /66   Pulse 74   Temp 98.1 °F (36.7 °C)   Resp 15   Ht 142.2 cm (56\")   Wt 64.9 kg (143 lb)   SpO2 93%   BMI 32.06 kg/m²   Physical Exam   Constitutional: She is oriented to person, place, and time. She appears well-developed and well-nourished. No distress.   HENT:   Head: Normocephalic and atraumatic.   Eyes: Pupils are equal, round, and reactive to light. Conjunctivae and EOM are normal. No scleral icterus.   Neck: Normal range of motion. Neck supple. No JVD present. No tracheal deviation present.   Cardiovascular: Normal rate, regular rhythm, normal heart sounds and intact distal pulses. Exam reveals no gallop.   No murmur heard.  Pulmonary/Chest: Effort normal. No respiratory distress. She has wheezes ( Expiratory). She has no rales.   Abdominal: Soft. Bowel sounds are normal. She exhibits no distension. There is no tenderness. There is no guarding.   Musculoskeletal: Normal range of motion. She exhibits no edema.   Neurological: She is alert and oriented to person, place, and time.   Skin: Skin is warm and dry. No rash noted. She is not diaphoretic. No erythema. No pallor.   Psychiatric: She has a normal mood and affect. Her behavior is normal.   Vitals reviewed.      Pertinent Data:   Lab Results (last 72 hours)     Procedure Component Value Units Date/Time    Urine Culture - Urine, Urine, Clean Catch [[id number]] Collected:  04/30/20 1935    Specimen:  Urine, Clean Catch Updated:  04/30/20 2206    Urinalysis With Microscopic If Indicated (No Culture) - Urine, Clean Catch [[id number]]  (Abnormal) Collected:  04/30/20 1935    Specimen:  Urine, Clean Catch Updated:  04/30/20 1952     Color, UA Yellow     Appearance, UA Clear     pH, UA 7.0     Specific Gravity, UA 1.016     Glucose,  mg/dL (1+)     Ketones, UA Negative     Bilirubin, UA Negative    "  Blood, UA Moderate (2+)     Protein, UA >=300 mg/dL (3+)     Leuk Esterase, UA Moderate (2+)     Nitrite, UA Negative     Urobilinogen, UA 0.2 E.U./dL    Urinalysis, Microscopic Only - Urine, Clean Catch [[id number]]  (Abnormal) Collected:  04/30/20 1935    Specimen:  Urine, Clean Catch Updated:  04/30/20 1952     RBC, UA 21-30 /HPF      WBC, UA 21-30 /HPF      Bacteria, UA 1+ /HPF      Squamous Epithelial Cells, UA 3-6 /HPF      Hyaline Casts, UA 0-2 /LPF      Methodology Automated Microscopy    Procalcitonin [[id number]]  (Abnormal) Collected:  04/30/20 1914    Specimen:  Blood Updated:  04/30/20 1950     Procalcitonin 0.39 ng/mL     Comprehensive Metabolic Panel [[id number]]  (Abnormal) Collected:  04/30/20 1914    Specimen:  Blood Updated:  04/30/20 1948     Glucose 185 mg/dL      BUN 28 mg/dL      Creatinine 5.02 mg/dL      Sodium 124 mmol/L      Potassium 5.3 mmol/L      Chloride 83 mmol/L      CO2 23.0 mmol/L      Calcium 9.5 mg/dL      Total Protein 6.8 g/dL      Albumin 3.80 g/dL      ALT (SGPT) 30 U/L      AST (SGOT) 43 U/L      Comment: Specimen hemolyzed.  Results may be affected.        Alkaline Phosphatase 324 U/L      Total Bilirubin 0.3 mg/dL      eGFR Non African Amer 9 mL/min/1.73      Comment: <15 Indicative of kidney failure.        eGFR   Amer --     Comment: <15 Indicative of kidney failure.        Globulin 3.0 gm/dL      A/G Ratio 1.3 g/dL      BUN/Creatinine Ratio 5.6     Anion Gap 18.0 mmol/L     Lactic Acid, Plasma [[id number]]  (Normal) Collected:  04/30/20 1914    Specimen:  Blood Updated:  04/30/20 1941     Lactate 1.0 mmol/L     Magnesium [[id number]]  (Normal) Collected:  04/30/20 1914    Specimen:  Blood Updated:  04/30/20 1938     Magnesium 2.4 mg/dL     Blood Culture - Blood, Arm, Right [[id number]] Collected:  04/30/20 1914    Specimen:  Blood from Arm, Right Updated:  04/30/20 1934    Blood Culture - Blood, Arm, Right [[id number]] Collected:  04/30/20 1915    Specimen:  Blood  from Arm, Right Updated:  04/30/20 1934    CBC Auto Differential [[id number]]  (Abnormal) Collected:  04/30/20 1914    Specimen:  Blood Updated:  04/30/20 1924     WBC 7.64 10*3/mm3      RBC 3.46 10*6/mm3      Hemoglobin 9.4 g/dL      Hematocrit 31.1 %      MCV 89.9 fL      MCH 27.2 pg      MCHC 30.2 g/dL      RDW 21.3 %      RDW-SD 65.5 fl      MPV 8.9 fL      Platelets 318 10*3/mm3      Neutrophil % 81.1 %      Lymphocyte % 11.3 %      Monocyte % 3.9 %      Eosinophil % 2.7 %      Basophil % 0.3 %      Immature Grans % 0.7 %      Neutrophils, Absolute 6.20 10*3/mm3      Lymphocytes, Absolute 0.86 10*3/mm3      Monocytes, Absolute 0.30 10*3/mm3      Eosinophils, Absolute 0.21 10*3/mm3      Basophils, Absolute 0.02 10*3/mm3      Immature Grans, Absolute 0.05 10*3/mm3      nRBC 0.4 /100 WBC         Imaging Results (Last 24 Hours)     Procedure Component Value Units Date/Time    XR Chest 1 View [[id number]] Collected:  04/30/20 1943     Updated:  04/30/20 1948    Narrative:       XR CHEST 1 VW- 4/30/2020 7:06 PM CDT     HISTORY: wheezing       COMPARISON: Chest exam dated 04/27/2020.     FINDINGS:      There is a dense retrocardiac consolidation with partially obscured left  hemidiaphragm. Notable blunting of the left costophrenic angle.  Additional small to moderate sized layering right pleural effusion.  Upper and midlung fields are clear. Mild cardiomegaly which is stable.  The pulmonary vasculature are nondilated. No pneumothorax. Reidentified  vascular stent projecting over the upper chest. No acute bony  abnormality.       Impression:       1. Dense retrocardiac consolidation reflects either atelectasis or  pneumonia in the left lower lobe. There are bilateral layering pleural  effusions (right greater than left).  2. Mild cardiomegaly which is stable.        This report was finalized on 04/30/2020 19:45 by Dr Jhonatan Berry, .          I have personally reviewed and interpreted the radiology studies obtained at  time of admission.     Assessment / Plan     Assessment:   Active Hospital Problems    Diagnosis   • **Pneumonia due to infectious organism   • Abnormal urine findings   • Tobacco abuse   • Non-compliance with renal dialysis (CMS/[facility])   • Type 2 diabetes mellitus, with long-term current use of insulin (CMS/[facility])   • End stage renal failure on dialysis (CMS/[facility])   • Glaucoma       Plan:   1.  Admit as observation  2.  Pharmacy to dose vancomycin and Zosyn  3.  Incentive spirometry, supplemental oxygen, pulmonary toileting, RT to initiate breathing treatment protocol  4.  Monitor glucose before meals with sliding scale coverage  5.  Consult nephrology, patient will need dialysis tomorrow usually takes on Tuesday/Thursday/Saturday  6.  Consult  for nursing home placement if patient refuses APC evaluation may be needed  7.  DVT prophylaxis with SCDs  8.  Home medications reviewed and restarted as appropriate  9.  Labs in a.m.    I discussed the patient's findings and my recommendations with: Federico Clancy DO  Time spent: 35 minutes    Plan discussed with NP and agree.  Heart regular. Discussed with patient lack of self care and she refuses NHP.  Wheezing bilateral bases.     Patient seen and examined by me on 4/3/2020 10:05PM.    Catina Villegas, APRN  04/30/20   22:42

## 2020-05-01 NOTE — THERAPY DISCHARGE NOTE
Acute Care - Occupational Therapy Initial Eval/Discharge  [facility]     Patient Name: Mini Gentile  : 1960  MRN: [id number]  Today's Date: 2020  Onset of Illness/Injury or Date of Surgery: 20  Date of Referral to OT: 20  Referring Physician: Dr. Lockett      Admit Date: 2020       ICD-10-CM ICD-9-CM   1. Urinary tract infection with hematuria, site unspecified N39.0 599.0    R31.9 599.70   2. Hyponatremia E87.1 276.1   3. Hyperkalemia E87.5 276.7   4. Stage 5 chronic kidney disease on chronic dialysis (CMS/[facility]) N18.6 585.6    Z99.2 V45.11   5. 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   • Eustachian tube dysfunction   • Chronic otitis media   • Pneumonia of left upper lobe due to Streptococcus (CMS/[facility])   • End stage renal failure on dialysis (CMS/[facility])   • Diabetes (CMS/[facility])   • Glaucoma   • HTN (hypertension)   • Proteinuria   • Type 2 diabetes mellitus, with long-term current use of insulin (CMS/[facility])   • Anemia due to chronic kidney disease, on chronic dialysis (CMS/[facility])   • Acute pulmonary edema (CMS/[facility])   • Non-compliance with renal dialysis (CMS/[facility])   • Elevated troponin due to ESRD    • Hyperkalemia   • Encephalopathy, metabolic, due to uremia   • High anion gap metabolic acidosis due to uremia   • Respiratory distress   • Acute diastolic heart failure (CMS/[facility])   • Lymph node enlargement, left axillary measuring 1.1 cm -follow-up for primary care   • Nausea vomiting and diarrhea   • Acute diastolic heart failure (CMS/[facility])   • Diabetes mellitus with ophthalmic complication (CMS/[facility])   • Tobacco abuse   • Urinary tract infection with hematuria   • Pneumonia due to infectious organism   • Abnormal urine findings     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    • Chronic otitis media    • Diabetes (CMS/[facility])    • End  stage renal disease on dialysis (CMS/[facility])    • Eustachian tube dysfunction    • Glaucoma    • HTN (hypertension)    • Mucoid otitis media    • Noncompliance of patient with renal dialysis (CMS/[facility])    • Tobacco abuse      Past Surgical History:   Procedure Laterality Date   • ARTERIOVENOUS FISTULA     • CATARACT EXTRACTION WITH INTRAOCULAR LENS IMPLANT     •  SECTION     • CHOLECYSTECTOMY     • MYRINGOTOMY W/ TUBES Bilateral    • MYRINGOTOMY W/ TUBES Right    • TUBAL ABDOMINAL LIGATION            OT ASSESSMENT FLOWSHEET (last 12 hours)      Occupational Therapy Evaluation     Row Name 20 1405                   OT Evaluation Time/Intention    Subjective Information  no complaints  -AC        Document Type  evaluation  -AC        Mode of Treatment  occupational therapy  -AC        Patient Effort  good  -AC           General Information    Patient Profile Reviewed?  yes  -AC        Onset of Illness/Injury or Date of Surgery  20  -AC        Referring Physician  Dr. Lockett  -        Patient Observations  alert;cooperative;agree to therapy  -AC        General Observations of Patient  lying in fowlers in bed, no apparent distress  -AC        Prior Level of Function  independent:;all household mobility;gait;transfer;bed mobility;ADL's;home management;cooking;cleaning  -AC        Equipment Currently Used at Home  walker, rolling  -AC        Pertinent History of Current Functional Problem  repeatedly missed HD tx d/t diarrhea, n/v, not feeling well, rehab consult to assess pt's ability to care for self at home; Dx: pneumonia, non compliance with HD, ESRD  -AC        Existing Precautions/Restrictions  fall  -AC        Risks Reviewed  patient:;LOB;nausea/vomiting;dizziness;increased discomfort;change in vital signs  -AC        Benefits Reviewed  patient:;improve function;increase independence;increase strength;increase balance;decrease pain;decrease risk of DVT;improve skin integrity;increase knowledge   -AC        Barriers to Rehab  none identified  -           Relationship/Environment    Lives With  significant other  -           Resource/Environmental Concerns    Current Living Arrangements  home/apartment/condo  -           Home Main Entrance    Number of Stairs, Main Entrance  four  -AC        Stair Railings, Main Entrance  railings on both sides of stairs  -AC           Cognitive Assessment/Interventions    Additional Documentation  Cognitive Assessment/Intervention (Group)  -           Cognitive Assessment/Intervention- PT/OT    Orientation Status (Cognition)  oriented x 4  -AC        Follows Commands (Cognition)  WNL  -        Personal Safety Interventions  fall prevention program maintained;gait belt;muscle strengthening facilitated;nonskid shoes/slippers when out of bed;supervised activity  -AC           Safety Issues, Functional Mobility    Impairments Affecting Function (Mobility)  endurance/activity tolerance  -AC           Bed Mobility Assessment/Treatment    Bed Mobility Assessment/Treatment  scooting/bridging;supine-sit;sit-supine  -        Scooting/Bridging [place] (Bed Mobility)  independent  -AC        Supine-Sit [place] (Bed Mobility)  independent  -        Sit-Supine [place] (Bed Mobility)  independent  -           Functional Mobility    Functional Mobility- Ind. Level  contact guard assist;standby assist  -        Functional Mobility- Comment  in hallway, back to bed  -AC           Transfer Assessment/Treatment    Transfer Assessment/Treatment  sit-stand transfer;stand-sit transfer  -AC           Sit-Stand Transfer    Sit-Stand [place] (Transfers)  independent  -AC           Stand-Sit Transfer    Stand-Sit [place] (Transfers)  independent  -           ADL Assessment/Intervention    BADL Assessment/Intervention  lower body dressing  -           Lower Body Dressing Assessment/Training    Lower Body Dressing [place]utz Level  don;doff;socks  -         Lower Body Dressing Position  edge of bed sitting  -AC           BADL Safety/Performance    Impairments, BADL Safety/Performance  endurance/activity tolerance  -AC           General ROM    GENERAL ROM COMMENTS  WFL AROM BUE  -AC           MMT (Manual Muscle Testing)    General MMT Comments  4/5 BUE  -AC           Motor Assessment/Interventions    Additional Documentation  Balance (Group)  -AC           Balance    Balance  static sitting balance;static standing balance;dynamic sitting balance;dynamic standing balance  -AC           Static Sitting Balance    Level of [place] (Unsupported Sitting, Static Balance)  independent  -AC           Dynamic Sitting Balance    Level of [place], Reaches Outside Midline (Sitting, Dynamic Balance)  independent  -AC           Static Standing Balance    Level of [place] (Supported Standing, Static Balance)  standby assist  -AC           Sensory Assessment/Intervention    Sensory General Assessment  no sensation deficits identified  -AC           Positioning and Restraints    Pre-Treatment Position  in bed  -AC        Post Treatment Position  bed  -AC        In Bed  fowlers;call light within reach;encouraged to call for assist;side rails up x2  -AC           Pain Assessment    Additional Documentation  Pain Scale: Numbers Pre/Post-Treatment (Group)  -AC           Pain Scale: Numbers Pre/Post-Treatment    Pain Scale: Numbers, Pretreatment  0/10 - no pain  -AC        Pain Scale: Numbers, Post-Treatment  0/10 - no pain  -AC           Clinical Impression (OT)    Date of Referral to OT  05/01/20  -        OT Diagnosis  decreased adl  -AC        Prognosis (OT Eval)  good  -AC        Criteria for Skilled Therapeutic Interventions Met (OT Eval)  no problems identified which require skilled intervention  -AC        Therapy Frequency (OT Eval)  evaluation only  -AC        Care Plan Review (OT)  evaluation/treatment results reviewed;care plan/treatment goals  reviewed;risks/benefits reviewed;current/potential barriers reviewed;patient/other agree to care plan  -        Anticipated Discharge Disposition (OT)  home with home health  -           Living Environment    Home Accessibility  stairs to enter home;tub/shower is not walk in  -          User Key  (r) = Recorded By, (t) = Taken By, (c) = Cosigned By    Initials Name Effective Dates    AC Aram Alf N, OTR/L, CNT 04/09/19 -               OT Recommendation and Plan  Outcome Summary/Treatment Plan (OT)  Anticipated Discharge Disposition (OT): home with home health, home with assist  Therapy Frequency (OT Eval): evaluation only  Plan of Care Review  Plan of Care Reviewed With: patient  Plan of Care Reviewed With: patient  Outcome Summary: OT eval completed.  Pt is alert and oriented x4.  Very pleasant.  Reports she has missed dialysis d/t diarrhea/GI sickness but understands the importance of going to all HD appts.  She was independent with all bed mobility, independent/SBA with transfers and SBA/CGA to ambulate in hallway.  She independently donned/doffed socks.  Strength in BUE is 4/5.  She has slightly decreased endurance but would be able to complete all ADL in home setting at her current level of function.  She reports she is going to purchase a tub bench which OT also recommended.  Pt appears safe for discharge home with HH.  Discussed benefit of HH with pt and she was agreeable and has had HH in the past.  OT will sign off at this time.         Outcome Measures     Row Name 05/01/20 7231             How much help from another is currently needed...    Putting on and taking off regular lower body clothing?  4  -AC      Bathing (including washing, rinsing, and drying)  4  -AC      Toileting (which includes using toilet bed pan or urinal)  4  -AC      Putting on and taking off regular upper body clothing  4  -AC      Taking care of personal grooming (such as brushing teeth)  4  -AC      Eating meals  4  -AC       AM-PAC 6 Clicks Score (OT)  24  -AC         Functional Assessment    Outcome Measure Options  AM-PAC 6 Clicks Daily Activity (OT)  -AC        User Key  (r) = Recorded By, (t) = Taken By, (c) = Cosigned By    Initials Name Provider Type    Alf Guan OTR/L, BETH Occupational Therapist          Time Calculation:   Time Calculation- OT     Row Name 05/01/20 1455             Time Calculation- OT    OT Start Time  1400  -AC      OT Stop Time  1456  -AC      OT Time Calculation (min)  56 min  -AC      OT Received On  05/01/20  -        User Key  (r) = Recorded By, (t) = Taken By, (c) = Cosigned By    Initials Name Provider Type    Alf Guan OTR/L, BETH Occupational Therapist        Therapy Suggested Charges     Code   Minutes Charges    None           Therapy Charges for Today     Code Description Service Date Service Provider Modifiers Qty    24851332243 HC OT EVAL LOW COMPLEXITY 4 5/1/2020 Alf Chiu OTR/L, CNT GO 1               OT Discharge Summary  Anticipated Discharge Disposition (OT): home with home health, home with assist  Reason for Discharge: At baseline function  Outcomes Achieved: Other  Discharge Destination: Home with assist, Home with home health    RADHA Mandujano/L, CNT  5/1/2020

## 2020-05-01 NOTE — PROGRESS NOTES
"Continued Stay Note  [facility]     Patient Name: Mini Gentile  MRN: [id number]  Today's Date: 5/1/2020    Admit Date: 4/30/2020    Discharge Plan     Row Name 05/01/20 1333       Plan    Plan  Home    Plan Comments  SW spoke with patient regarding SNF placement.  Patient stated, \"I ain't going to no nursing home.\"  SW advised this could even be short term and patient continued to decline.  Patient was alert, oriented, sitting up in bed eating her lunch.  Patient does have [facility] now that was arranged when she discharged on 4/29.  [facility].  SW did proceed with referral to [facility], report ID# [id number].  At this time, [facility] will not be accepting report but will keep information on file.        Discharge Codes    No documentation.             STACI Ramirez    "

## 2020-05-01 NOTE — PLAN OF CARE
Problem: Patient Care Overview  Goal: Plan of Care Review  Outcome: Ongoing (interventions implemented as appropriate)  Flowsheets  Taken 5/1/2020 0413  Progress: no change  Outcome Summary: Pt admitted to [facility]B from ER. Complained of back pain, treated with PRN tylenol with relief. Pt resting with eyes closed between care. Nephrology and  consulted for today. NSR 69-72 on tele. VSS. Will continue to monitor.  Taken 5/1/2020 0000  Plan of Care Reviewed With: patient

## 2020-05-01 NOTE — OUTREACH NOTE
Medical Week 1 Survey      Responses   [facility] patient discharged from?  [facility]   COVID-19 Test Status  Negative   Does the patient have one of the following disease processes/diagnoses(primary or secondary)?  Other   Is there a successful TCM telephone encounter documented?  No   Week 1 attempt successful?  No   Revoke  Readmitted          Deana Hay RN

## 2020-05-01 NOTE — PLAN OF CARE
Problem: Patient Care Overview  Goal: Plan of Care Review  Outcome: Ongoing (interventions implemented as appropriate)  Flowsheets (Taken 5/1/2020 1451)  Progress: no change  Plan of Care Reviewed With: patient  Outcome Summary: OT leonel completed.  Pt is alert and oriented x4.  Very pleasant.  Reports she has missed dialysis d/t diarrhea/GI sickness but understands the importance of going to all HD appts.  She was independent with all bed mobility, independent/SBA with transfers and SBA/CGA to ambulate in hallway.  She independently donned/doffed socks.  Strength in BUE is 4/5.  She has slightly decreased endurance but would be able to complete all ADL in home setting at her current level of function.  She reports she is going to purchase a tub bench which OT also recommended.  Pt appears safe for discharge home with HH.  Discussed benefit of HH with pt and she was agreeable and has had HH in the past.  OT will sign off at this time.

## 2020-05-01 NOTE — PROGRESS NOTES
Discharge Planning Assessment  [facility]     Patient Name: Mini Gentile  MRN: [id number]  Today's Date: 5/1/2020    Admit Date: 4/30/2020    Discharge Needs Assessment     Row Name 05/01/20 0802       Living Environment    Lives With  significant other    Current Living Arrangements  home/apartment/condo    Primary Care Provided by  self    Provides Primary Care For  no one    Family Caregiver if Needed  child(devon), adult;significant other    Quality of Family Relationships  unable to assess    Able to Return to Prior Arrangements  yes       Resource/Environmental Concerns    Resource/Environmental Concerns  none       Transition Planning    Patient/Family Anticipates Transition to  home with family;home;home with help/services    Patient/Family Anticipated Services at Transition  home health care;    Transportation Anticipated  family or friend will provide       Discharge Needs Assessment    Readmission Within the Last 30 Days  other (see comments) noncompliance    Concerns to be Addressed  decision making;substance/tobacco abuse/use;compliance issue    Equipment Currently Used at Home  cane, straight;walker, rolling;glucometer;nebulizer    Outpatient/Agency/Support Group Needs  skilled nursing facility;outpatient hemodialysis    Discharge Facility/Level of Care Needs  nursing facility, skilled;home with home health    Current Discharge Risk  chronically ill;substance use/abuse;non-[facility]iance    Discharge Coordination/Progress  Patient admitted from home where she resides with her significant other.  Patient was recently hospitalized at this facility from 4/27/20 - 4/29/20.    Patient is a dialysis patient, T,T,S and has transportation provided through her Medicaid for free via [employer].  Patient is noncompliant with dialysis per chart, and misses dialysis appointments.  Patient continues to smoke cigarettes as well.  Can refer to [facility] for diagnosis of pneumonia and they can assist with smoking  cessation if patient will agree.  Patient was sent home with [facility].  SW has informed  that patient has readmitted.  Admitting physician has discussed SNF placement with patient and she refused.  Patient currently in dialysis and SW can discuss with her when she has returned to her room.  Admitting physician also suggests possible [facility] referral.  Is patient capable of making decisions?  If so, is she just making bad decisions or is competency an issue?  Will need therapy evaluations to assess overall level of functioning as well.  SW following and to meet with patient when out of dialysis.        Discharge Plan    No documentation.       Destination      Coordination has not been started for this encounter.      Durable Medical Equipment      Coordination has not been started for this encounter.      Dialysis/Infusion      Coordination has not been started for this encounter.      Home Medical Care      Coordination has not been started for this encounter.      Therapy      Coordination has not been started for this encounter.      Community Resources      Coordination has not been started for this encounter.          Demographic Summary    No documentation.       Functional Status    No documentation.       Psychosocial    No documentation.       Abuse/Neglect    No documentation.       Legal    No documentation.       Substance Abuse    No documentation.       Patient Forms    No documentation.           STACI Ramirez

## 2020-05-01 NOTE — CONSULTS
Nephrology ([facility]) Consult Note      Patient:  Mini Gentile  YOB: 1960  Date of Service: 5/1/2020  MRN: [id number]   Acct: [id number]   Primary Care Physician: Bharati Dahl APRN  Advance Directive:   Code Status and Medical Interventions:   Ordered at: 04/30/20 2226     Level Of Support Discussed With:    Patient     Code Status:    CPR     Medical Interventions (Level of Support Prior to Arrest):    Full     Admit Date: 4/30/2020       Hospital Day: 0  Referring Provider: Federico Clancy DO      Patient personally seen and examined.  Complete chart including Consults, Notes, Operative Reports, Labs, Cardiology, and Radiology studies reviewed as able.        Subjective:  Mini Gentile is a 60 y.o. female  whom we were consulted for end stage renal disease.  Maintenance hemodialysis Tuesday Thursday Saturday at [facility]. History of type 2 diabetes, hypertension, ongoing tobacco abuse. Multiple recent admissions; in fact--patient was just discharged from this facility on 4/29. Ongoing pattern of feeling too sick to go to outpatient dialysis; she did not go to her treatment on 4/30 due to diarrhea and abdominal pain. Instead she presented to ER later the same evening with nausea/vomiting, abdominal pain.  Currently is seen during dialysis; tolerating well. No nausea or dyspnea at time of exam.    Dialysis   Pt was seen on RRT; tolerating well  Modality: Hemodialysis  Access: Arterial Venous Fistula  Location: left upper  QB: 400  QD: 600  UF: 3000    Allergies:  Bupropion; Chantix [varenicline]; Sulfa antibiotics; Azithromycin; Levofloxacin; and Penicillins    Home Meds:  Medications Prior to Admission   Medication Sig Dispense Refill Last Dose   • albuterol sulfate  (90 Base) MCG/ACT inhaler Inhale 2 puffs Every 4 (Four) Hours As Needed for Wheezing. 18 g 0 Past Week at Unknown time   • aspirin 81 MG EC tablet Take 81 mg by mouth  Daily.   Past Week at Unknown time   • bismuth subsalicylate (PEPTO BISMOL) 262 MG chewable tablet Chew 262 mg Daily As Needed for Indigestion.   Past Week at Unknown time   • brimonidine (ALPHAGAN) 0.2 % ophthalmic solution Administer 1 drop into the left eye 3 (Three) Times a Day.   3/18/2020 at Unknown time   • calcitriol (ROCALTROL) 0.25 MCG capsule Take 0.25 mcg by mouth Take As Directed. Only given on dialysis days (Tuesday, Thursday, Saturday)   3/17/2020 at Unknown time   • carvedilol (COREG) 12.5 MG tablet Take 1 tablet by mouth 2 (Two) Times a Day With Meals. 60 tablet 0 3/18/2020 at Unknown time   • cholecalciferol (VITAMIN D3) 1000 units tablet Take 2,000 Units by mouth Daily.   3/18/2020 at Unknown time   • dorzolamide (TRUSOPT) 2 % ophthalmic solution Administer 1 drop into the left eye 2 (Two) Times a Day.   3/18/2020 at Unknown time   • famotidine (PEPCID) 20 MG tablet Take 1 tablet by mouth 2 (Two) Times a Day. 60 tablet 2    • fluticasone (FLONASE) 50 MCG/ACT nasal spray 2 sprays into the nostril(s) as directed by provider 2 (Two) Times a Day.   Past Week at Unknown time   • homatropine 5 % ophthalmic solution Administer 1 drop into the left eye Daily.   Past Week at Unknown time   • insulin glargine (LANTUS) 100 UNIT/ML injection Inject 10 Units under the skin Every Night.   3/18/2020 at Unknown time   • Latanoprostene Bunod (Vyzulta) 0.024 % solution Apply 1 drop to eye(s) as directed by provider Every Night. Left eye   Past Week at Unknown time   • levothyroxine (SYNTHROID, LEVOTHROID) 25 MCG tablet Take 1 tablet by mouth Daily. 30 tablet 2 3/18/2020 at Unknown time   • losartan (COZAAR) 50 MG tablet Take 50 mg by mouth 2 (Two) Times a Day.   3/18/2020 at Unknown time   • Netarsudil Dimesylate (RHOPRESSA) 0.02 % solution Administer 1 drop into the left eye Every Night.   Past Week at Unknown time   • nicotine (NICODERM CQ) 21 MG/24HR patch Place 1 patch on the skin as directed by provider Daily.  30 patch 2    • ondansetron (ZOFRAN) 4 MG tablet Take 1 tablet by mouth Every 6 (Six) Hours As Needed for Nausea or Vomiting. 24 tablet 2    • pravastatin (PRAVACHOL) 10 MG tablet Take 10 mg by mouth Every Night.   3/18/2020 at Unknown time   • sertraline (ZOLOFT) 25 MG tablet Take 25 mg by mouth Daily.   3/18/2020 at Unknown time   • sevelamer (RENAGEL) 800 MG tablet Take 2,400 mg by mouth 3 (Three) Times a Day With Meals.   3/18/2020 at Unknown time   • timolol (TIMOPTIC) 0.5 % ophthalmic solution Administer 1 drop into the left eye Every Morning.   Past Week at Unknown time       Medicines:  Current Facility-Administered Medications   Medication Dose Route Frequency Provider Last Rate Last Dose   • acetaminophen (TYLENOL) tablet 650 mg  650 mg Oral Q4H PRN Catina Villegas APRN   650 mg at 05/01/20 0334    Or   • acetaminophen (TYLENOL) 160 MG/5ML solution 650 mg  650 mg Oral Q4H PRN Catina Villegas APRN        Or   • acetaminophen (TYLENOL) suppository 650 mg  650 mg Rectal Q4H PRN Catina Villegas APRN       • albumin human 25 % IV SOLN 12.5 g  12.5 g Intravenous PRN Ten Boyd MD       • aspirin EC tablet 81 mg  81 mg Oral Daily Catina Villegas APRN       • brimonidine (ALPHAGAN) 0.2 % ophthalmic solution 1 drop  1 drop Left Eye TID Catina Villegas APRN       • carvedilol (COREG) tablet 12.5 mg  12.5 mg Oral BID With Meals Catina Villegas APRN       • cefepime (MAXIPIME) 1 g/100 mL 0.9% NS IVPB (mbp)  1 g Intravenous Q12H Lyndon Lockett MD       • dextrose (D50W) 25 g/ 50mL Intravenous Solution 25 g  25 g Intravenous Q15 Min PRN Catina Villegas APRN       • dextrose (GLUTOSE) oral gel 15 g  15 g Oral Q15 Min PRN Catina Villegas APRN       • dorzolamide (TRUSOPT) 2 % ophthalmic solution 1 drop  1 drop Left Eye BID Catina Villegas APRN   1 drop at 05/01/20 0051   • epoetin vika (EPOGEN,PROCRIT) injection 5,000 Units  5,000 Units Subcutaneous Once per day on Mon Wed Fri  Lyndon Lockett MD       • [START ON 5/2/2020] famotidine (PEPCID) tablet 20 mg  20 mg Oral Daily Lyndon Lockett MD       • fluticasone (FLONASE) 50 MCG/ACT nasal spray 2 spray  2 spray Nasal BID Catina Villegas, APRN   2 spray at 05/01/20 0052   • glucagon (human recombinant) (GLUCAGEN DIAGNOSTIC) injection 1 mg  1 mg Subcutaneous Q15 Min PRN Catina Villegas APRAC       • insulin lispro (humaLOG) injection 2-7 Units  2-7 Units Subcutaneous TID AC Catina Villegas, APRN       • lactobacillus acidophilus (RISAQUAD) capsule 1 capsule  1 capsule Oral Daily Catina Villegas, ANTHONY       • levothyroxine (SYNTHROID, LEVOTHROID) tablet 25 mcg  25 mcg Oral Daily Catina Villegas APRN   25 mcg at 05/01/20 0502   • losartan (COZAAR) tablet 50 mg  50 mg Oral BID Catina Villegas APRN   50 mg at 05/01/20 0052   • nicotine (NICODERM CQ) 21 MG/24HR patch 1 patch  1 patch Transdermal Q24H Catina Villegas APRN   1 patch at 05/01/20 0051   • ondansetron (ZOFRAN) tablet 4 mg  4 mg Oral Q6H PRN Catina Villegas, ANTHONY        Or   • ondansetron (ZOFRAN) injection 4 mg  4 mg Intravenous Q6H PRN Catina Villegas, APRN       • Pharmacy to dose vancomycin   Does not apply Continuous PRN Catina Villegas APRN       • Pharmacy to Dose Zosyn   Does not apply Continuous PRN Catina Villegas, APRAC       • pravastatin (PRAVACHOL) tablet 10 mg  10 mg Oral Nightly Catina Villegas APRN   10 mg at 05/01/20 0052   • sertraline (ZOLOFT) tablet 25 mg  25 mg Oral Daily Catina Villegas, APRAC       • sevelamer (RENVELA) tablet 2,400 mg  2,400 mg Oral TID With Meals Catina Villegas, APRN       • sodium chloride 0.9 % bolus 100 mL  100 mL Intravenous PRN Ten Boyd MD       • sodium chloride 0.9 % flush 10 mL  10 mL Intravenous PRN Catina Villegas APRN       • sodium chloride 0.9 % flush 10 mL  10 mL Intravenous Q12H Catina Villegas APRN   10 mL at 05/01/20 0053   • sodium chloride 0.9 % flush 10 mL  10 mL  Intravenous PRN Catina Villegas, APRN       • timolol (TIMOPTIC) 0.5 % ophthalmic solution 1 drop  1 drop Left Eye QAM Catina Villegas APRN   1 drop at 20 0502       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    • Chronic otitis media    • Diabetes (CMS/[facility])    • End stage renal disease on dialysis (CMS/[facility])    • Eustachian tube dysfunction    • Glaucoma    • HTN (hypertension)    • Mucoid otitis media    • Noncompliance of patient with renal dialysis (CMS/[facility])    • Tobacco abuse        Past Surgical History:  Past Surgical History:   Procedure Laterality Date   • ARTERIOVENOUS FISTULA     • CATARACT EXTRACTION WITH INTRAOCULAR LENS IMPLANT     •  SECTION     • CHOLECYSTECTOMY     • MYRINGOTOMY W/ TUBES Bilateral    • MYRINGOTOMY W/ TUBES Right    • TUBAL ABDOMINAL LIGATION         Family History  Family History   Problem Relation Age of Onset   • Heart disease Mother    • Diabetes Father        Social History  Social History     Socioeconomic History   • Marital status: Single     Spouse name: Not on file   • Number of children: Not on file   • Years of education: Not on file   • Highest education level: Not on file   Tobacco Use   • Smoking status: Current Every Day Smoker     Packs/day: 2.00     Years: 6.00     Pack years: 12.00     Types: Cigarettes   • Smokeless tobacco: Never Used   Substance and Sexual Activity   • Alcohol use: No   • Drug use: No   • Sexual activity: Defer         Review of Systems:  History obtained from chart review and the patient  General ROS: No fever or chills  Respiratory ROS: No cough, shortness of breath, wheezing  Cardiovascular ROS: No chest pain or palpitations  Gastrointestinal ROS: No abdominal pain or melena  Genito-Urinary ROS: No dysuria or hematuria  Neurological ROS: no headache or dizziness  14 point ROS reviewed with the patient and negative except as noted above and in the HPI  "unless unable to obtain.    Objective:  Patient Vitals for the past 24 hrs:   BP Temp Temp src Pulse Resp SpO2 Height Weight   05/01/20 0330 152/63 98.4 °F (36.9 °C) Oral 69 18 96 % -- --   04/30/20 2344 135/72 97.1 °F (36.2 °C) Oral 70 17 91 % 147.3 cm (58\") 66.4 kg (146 lb 6.4 oz)   04/30/20 2317 -- -- -- 80 -- -- -- --   04/30/20 2300 114/91 97.9 °F (36.6 °C) Oral 83 17 -- -- --   04/30/20 2259 -- -- -- -- -- 95 % -- --   04/30/20 2245 149/72 -- -- 76 -- 95 % -- --   04/30/20 2230 155/66 -- -- 74 -- 93 % -- --   04/30/20 2216 -- -- -- -- -- 95 % -- --   04/30/20 2215 152/70 -- -- 74 -- -- -- --   04/30/20 2201 -- -- -- -- -- 94 % -- --   04/30/20 2200 150/69 -- -- 74 -- -- -- --   04/30/20 2145 155/68 -- -- -- -- -- -- --   04/30/20 2143 -- -- -- -- -- 95 % -- --   04/30/20 2130 152/72 -- -- 74 -- -- -- --   04/30/20 2128 -- -- -- -- -- 93 % -- --   04/30/20 2115 155/62 -- -- -- -- -- -- --   04/30/20 2113 -- -- -- -- -- 94 % -- --   04/30/20 1937 -- -- -- -- -- 97 % -- --   04/30/20 1930 159/69 -- -- -- -- 95 % -- --   04/30/20 1900 164/75 -- -- -- -- -- -- --   04/30/20 1856 -- -- -- -- -- 98 % -- --   04/30/20 1839 160/70 -- -- -- -- -- -- --   04/30/20 1838 -- 98.1 °F (36.7 °C) -- 74 15 94 % -- --   04/30/20 1837 -- -- -- -- -- -- 142.2 cm (56\") 64.9 kg (143 lb)       Intake/Output Summary (Last 24 hours) at 5/1/2020 1031  Last data filed at 5/1/2020 0403  Gross per 24 hour   Intake 170 ml   Output 100 ml   Net 70 ml     General: awake/alert    Neck: supple, no JVD  Chest:  clear to auscultation bilaterally without respiratory distress  CVS: regular rate and rhythm  Abdominal: soft, nontender, positive bowel sounds  Extremities: no cyanosis or edema  Skin: warm and dry without rash  Neuro: no focal motor deficits    Labs:  Results from last 7 days   Lab Units 05/01/20  0446 04/30/20  1914 04/28/20  0759   WBC 10*3/mm3 5.92 7.64 7.37   HEMOGLOBIN g/dL 9.0* 9.4* 9.2*   HEMATOCRIT % 29.7* 31.1* 31.0*   PLATELETS " 10*3/mm3 306 318 346         Results from last 7 days   Lab Units 05/01/20  0446 04/30/20  1914 04/29/20  0444   SODIUM mmol/L 125* 124* 128*   POTASSIUM mmol/L 5.2 5.3* 5.0   CHLORIDE mmol/L 86* 83* 88*   CO2 mmol/L 23.0 23.0 28.0   BUN mg/dL 28* 28* 20   CREATININE mg/dL 5.37* 5.02* 4.07*   CALCIUM mg/dL 9.2 9.5 8.9   BILIRUBIN mg/dL 0.3 0.3 0.3   ALK PHOS U/L 282* 324* 289*   ALT (SGPT) U/L 25 30 22   AST (SGOT) U/L 27 43* 27   GLUCOSE mg/dL 138* 185* 177*       Radiology:   Imaging Results (Last 72 Hours)     Procedure Component Value Units Date/Time    XR Chest 1 View [[id number]] Collected:  04/30/20 1943     Updated:  04/30/20 1948    Narrative:       XR CHEST 1 VW- 4/30/2020 7:06 PM CDT     HISTORY: wheezing       COMPARISON: Chest exam dated 04/27/2020.     FINDINGS:      There is a dense retrocardiac consolidation with partially obscured left  hemidiaphragm. Notable blunting of the left costophrenic angle.  Additional small to moderate sized layering right pleural effusion.  Upper and midlung fields are clear. Mild cardiomegaly which is stable.  The pulmonary vasculature are nondilated. No pneumothorax. Reidentified  vascular stent projecting over the upper chest. No acute bony  abnormality.       Impression:       1. Dense retrocardiac consolidation reflects either atelectasis or  pneumonia in the left lower lobe. There are bilateral layering pleural  effusions (right greater than left).  2. Mild cardiomegaly which is stable.        This report was finalized on 04/30/2020 19:45 by Dr Jhonatan Berry, .          Culture:  No results found for: BLOODCX, URINECX, WOUNDCX, MRSACX, RESPCX, STOOLCX      Assessment   1.  End stage renal disease on hemodialysis TTS  2.  Type 2 diabetes with nephropathy  3.  Essential hypertension  4.  Tobacco abuse  5.  Anemia of CKD  6.  Medical noncompliance   7.  Volume overload  8.  Hyponatremia     Plan:  1.  Make-up dialysis today  2.  Patient encouraged to go to her  outpatient treatments; this would dramatically improve her chances of staying out of the hospital  3.  Monitor labs  4.  Further assessment and plan pending Dr Gonzalez's evaluation of patient       Thank you for the consult, we appreciate the opportunity to provide care to your patients.  Feel free to contact me if I can be of any further assistance.      Gomez Lemos, APRN  5/1/2020  10:31

## 2020-05-01 NOTE — PROGRESS NOTES
[facility] Medicine Services  INPATIENT PROGRESS NOTE    Length of Stay: 0  Date of Admission: 4/30/2020  Primary Care Physician: Bharati Dahl APRN    Subjective   Chief Complaint: Nausea/vomiting/diarrhea.  Dialysis for renal failure.  Hyponatremia.  Questionable UTI.  Possible pneumonia.    HPI   Chest pain with the same as last time upon discharge.  There is nothing new here.  Waiting for blood culture come back.  Patient just finished dialysis and very fatigued from it.  No sign of diarrhea overnight.  No sign nausea vomiting overnight and today.  T-max 98.4.  T-current 98.4.    Review of Systems   Constitutional: Positive for activity change, appetite change and fatigue. Negative for chills and fever.   HENT: Negative for hearing loss, nosebleeds, tinnitus and trouble swallowing.    Eyes: Negative for visual disturbance.   Respiratory: Negative for cough, chest tightness, shortness of breath and wheezing.    Cardiovascular: Negative for chest pain, palpitations and leg swelling.   Gastrointestinal: Positive for nausea. Negative for abdominal distention, abdominal pain, blood in stool, constipation, diarrhea and vomiting.   Endocrine: Negative for cold intolerance, heat intolerance, polydipsia, polyphagia and polyuria.   Genitourinary: Negative for decreased urine volume, difficulty urinating, dysuria, flank pain, frequency and hematuria.   Musculoskeletal: Positive for arthralgias, gait problem and myalgias. Negative for joint swelling.   Skin: Negative for rash.   Allergic/Immunologic: Negative for immunocompromised state.   Neurological: Positive for weakness. Negative for dizziness, syncope, light-headedness and headaches.   Hematological: Negative for adenopathy. Does not bruise/bleed easily.   Psychiatric/Behavioral: Negative for confusion and sleep disturbance. The patient is not nervous/anxious.           All pertinent negatives and positives are as above. All other  systems have been reviewed and are negative unless otherwise stated.     Objective    Temp:  [97.1 °F (36.2 °C)-98.4 °F (36.9 °C)] 98.4 °F (36.9 °C)  Heart Rate:  [69-83] 69  Resp:  [15-18] 18  BP: (114-164)/(62-91) 152/63    Intake/Output Summary (Last 24 hours) at 5/1/2020 1017  Last data filed at 5/1/2020 0403  Gross per 24 hour   Intake 170 ml   Output 100 ml   Net 70 ml     Physical Exam   Constitutional: She is oriented to person, place, and time. She appears well-developed.   HENT:   Head: Normocephalic.   Eyes: Pupils are equal, round, and reactive to light. Conjunctivae are normal.   Neck: Neck supple. No JVD present.   Cardiovascular: Normal rate, regular rhythm, normal heart sounds and intact distal pulses. Exam reveals no gallop and no friction rub.   No murmur heard.  Pulmonary/Chest: No respiratory distress. She has no wheezes. She has no rales. She exhibits no tenderness.   Diminished breath sound bilateral, clear.   Abdominal: Soft. Bowel sounds are normal. She exhibits no distension. There is no tenderness. There is no rebound and no guarding.   Obesity.   Musculoskeletal: Normal range of motion. She exhibits no edema, tenderness or deformity.   Neurological: She is alert and oriented to person, place, and time. She displays normal reflexes. No cranial nerve deficit. She exhibits abnormal muscle tone. Coordination abnormal.   Skin: Skin is warm and dry. Capillary refill takes 2 to 3 seconds. No rash noted.   Psychiatric: She has a normal mood and affect. Her behavior is normal. Thought content normal.   Nursing note and vitals reviewed.      Results Review:  Lab Results (last 24 hours)     Procedure Component Value Units Date/Time    Comprehensive Metabolic Panel [[id number]]  (Abnormal) Collected:  05/01/20 0446    Specimen:  Blood Updated:  05/01/20 0550     Glucose 138 mg/dL      BUN 28 mg/dL      Creatinine 5.37 mg/dL      Sodium 125 mmol/L      Potassium 5.2 mmol/L      Chloride 86 mmol/L       CO2 23.0 mmol/L      Calcium 9.2 mg/dL      Total Protein 6.4 g/dL      Albumin 3.50 g/dL      ALT (SGPT) 25 U/L      AST (SGOT) 27 U/L      Alkaline Phosphatase 282 U/L      Total Bilirubin 0.3 mg/dL      eGFR Non African Amer 8 mL/min/1.73      Comment: <15 Indicative of kidney failure.        eGFR   Amer --     Comment: <15 Indicative of kidney failure.        Globulin 2.9 gm/dL      A/G Ratio 1.2 g/dL      BUN/Creatinine Ratio 5.2     Anion Gap 16.0 mmol/L     Narrative:       GFR Normal >60  Chronic Kidney Disease <60  Kidney Failure <15      CBC Auto Differential [[id number]]  (Abnormal) Collected:  05/01/20 0446    Specimen:  Blood Updated:  05/01/20 0532     WBC 5.92 10*3/mm3      RBC 3.30 10*6/mm3      Hemoglobin 9.0 g/dL      Hematocrit 29.7 %      MCV 90.0 fL      MCH 27.3 pg      MCHC 30.3 g/dL      RDW 21.2 %      RDW-SD 65.5 fl      MPV 8.9 fL      Platelets 306 10*3/mm3      Neutrophil % 75.4 %      Lymphocyte % 14.7 %      Monocyte % 4.4 %      Eosinophil % 4.7 %      Basophil % 0.3 %      Immature Grans % 0.5 %      Neutrophils, Absolute 4.46 10*3/mm3      Lymphocytes, Absolute 0.87 10*3/mm3      Monocytes, Absolute 0.26 10*3/mm3      Eosinophils, Absolute 0.28 10*3/mm3      Basophils, Absolute 0.02 10*3/mm3      Immature Grans, Absolute 0.03 10*3/mm3      nRBC 0.5 /100 WBC     Urine Culture - Urine, Urine, Clean Catch [[id number]] Collected:  04/30/20 1935    Specimen:  Urine, Clean Catch Updated:  04/30/20 2206    Urinalysis With Microscopic If Indicated (No Culture) - Urine, Clean Catch [[id number]]  (Abnormal) Collected:  04/30/20 1935    Specimen:  Urine, Clean Catch Updated:  04/30/20 1952     Color, UA Yellow     Appearance, UA Clear     pH, UA 7.0     Specific Gravity, UA 1.016     Glucose,  mg/dL (1+)     Ketones, UA Negative     Bilirubin, UA Negative     Blood, UA Moderate (2+)     Protein, UA >=300 mg/dL (3+)     Leuk Esterase, UA Moderate (2+)     Nitrite, UA Negative      "Urobilinogen, UA 0.2 E.U./dL    Urinalysis, Microscopic Only - Urine, Clean Catch [[id number]]  (Abnormal) Collected:  04/30/20 1935    Specimen:  Urine, Clean Catch Updated:  04/30/20 1952     RBC, UA 21-30 /HPF      WBC, UA 21-30 /HPF      Bacteria, UA 1+ /HPF      Squamous Epithelial Cells, UA 3-6 /HPF      Hyaline Casts, UA 0-2 /LPF      Methodology Automated Microscopy    Procalcitonin [[id number]]  (Abnormal) Collected:  04/30/20 1914    Specimen:  Blood Updated:  04/30/20 1950     Procalcitonin 0.39 ng/mL     Narrative:       As a Marker for Sepsis (Non-Neonates):   1. <0.5 ng/mL represents a low risk of severe sepsis and/or septic shock.  1. >2 ng/mL represents a high risk of severe sepsis and/or septic shock.    As a Marker for Lower Respiratory Tract Infections that require antibiotic therapy:  PCT on Admission     Antibiotic Therapy             6-12 Hrs later  > 0.5                Strongly Recommended            >0.25 - <0.5         Recommended  0.1 - 0.25           Discouraged                   Remeasure/reassess PCT  <0.1                 Strongly Discouraged          Remeasure/reassess PCT      As 28 day mortality risk marker: \"Change in Procalcitonin Result\" (> 80 % or <=80 %) if Day 0 (or Day 1) and Day 4 values are available. Refer to http://www.[employer]-pct-calculator.com/   Change in PCT <=80 %   A decrease of PCT levels below or equal to 80 % defines a positive change in PCT test result representing a higher risk for 28-day all-cause mortality of patients diagnosed with severe sepsis or septic shock.  Change in PCT > 80 %   A decrease of PCT levels of more than 80 % defines a negative change in PCT result representing a lower risk for 28-day all-cause mortality of patients diagnosed with severe sepsis or septic shock.                Results may be falsely decreased if patient taking Biotin.     Comprehensive Metabolic Panel [[id number]]  (Abnormal) Collected:  04/30/20 1914    Specimen:  Blood Updated: "  04/30/20 1948     Glucose 185 mg/dL      BUN 28 mg/dL      Creatinine 5.02 mg/dL      Sodium 124 mmol/L      Potassium 5.3 mmol/L      Chloride 83 mmol/L      CO2 23.0 mmol/L      Calcium 9.5 mg/dL      Total Protein 6.8 g/dL      Albumin 3.80 g/dL      ALT (SGPT) 30 U/L      AST (SGOT) 43 U/L      Comment: Specimen hemolyzed.  Results may be affected.        Alkaline Phosphatase 324 U/L      Total Bilirubin 0.3 mg/dL      eGFR Non African Amer 9 mL/min/1.73      Comment: <15 Indicative of kidney failure.        eGFR   Amer --     Comment: <15 Indicative of kidney failure.        Globulin 3.0 gm/dL      A/G Ratio 1.3 g/dL      BUN/Creatinine Ratio 5.6     Anion Gap 18.0 mmol/L     Narrative:       GFR Normal >60  Chronic Kidney Disease <60  Kidney Failure <15      Lactic Acid, Plasma [[id number]]  (Normal) Collected:  04/30/20 1914    Specimen:  Blood Updated:  04/30/20 1941     Lactate 1.0 mmol/L     Magnesium [[id number]]  (Normal) Collected:  04/30/20 1914    Specimen:  Blood Updated:  04/30/20 1938     Magnesium 2.4 mg/dL     Blood Culture - Blood, Arm, Right [[id number]] Collected:  04/30/20 1914    Specimen:  Blood from Arm, Right Updated:  04/30/20 1934    Blood Culture - Blood, Arm, Right [[id number]] Collected:  04/30/20 1915    Specimen:  Blood from Arm, Right Updated:  04/30/20 1934    CBC & Differential [[id number]] Collected:  04/30/20 1914    Specimen:  Blood Updated:  04/30/20 1924    Narrative:       The following orders were created for panel order CBC & Differential.  Procedure                               Abnormality         Status                     ---------                               -----------         ------                     CBC Auto Differential[[id number]]        Abnormal            Final result                 Please view results for these tests on the individual orders.    CBC Auto Differential [[id number]]  (Abnormal) Collected:  04/30/20 1914    Specimen:  Blood Updated:   04/30/20 1924     WBC 7.64 10*3/mm3      RBC 3.46 10*6/mm3      Hemoglobin 9.4 g/dL      Hematocrit 31.1 %      MCV 89.9 fL      MCH 27.2 pg      MCHC 30.2 g/dL      RDW 21.3 %      RDW-SD 65.5 fl      MPV 8.9 fL      Platelets 318 10*3/mm3      Neutrophil % 81.1 %      Lymphocyte % 11.3 %      Monocyte % 3.9 %      Eosinophil % 2.7 %      Basophil % 0.3 %      Immature Grans % 0.7 %      Neutrophils, Absolute 6.20 10*3/mm3      Lymphocytes, Absolute 0.86 10*3/mm3      Monocytes, Absolute 0.30 10*3/mm3      Eosinophils, Absolute 0.21 10*3/mm3      Basophils, Absolute 0.02 10*3/mm3      Immature Grans, Absolute 0.05 10*3/mm3      nRBC 0.4 /100 WBC            Cultures:  No results found for: BLOODCX, URINECX, WOUNDCX, MRSACX, RESPCX, STOOLCX    Radiology Data:    Imaging Results (Last 24 Hours)     Procedure Component Value Units Date/Time    XR Chest 1 View [[id number]] Collected:  04/30/20 1943     Updated:  04/30/20 1948    Narrative:       XR CHEST 1 VW- 4/30/2020 7:06 PM CDT     HISTORY: wheezing       COMPARISON: Chest exam dated 04/27/2020.     FINDINGS:      There is a dense retrocardiac consolidation with partially obscured left  hemidiaphragm. Notable blunting of the left costophrenic angle.  Additional small to moderate sized layering right pleural effusion.  Upper and midlung fields are clear. Mild cardiomegaly which is stable.  The pulmonary vasculature are nondilated. No pneumothorax. Reidentified  vascular stent projecting over the upper chest. No acute bony  abnormality.       Impression:       1. Dense retrocardiac consolidation reflects either atelectasis or  pneumonia in the left lower lobe. There are bilateral layering pleural  effusions (right greater than left).  2. Mild cardiomegaly which is stable.        This report was finalized on 04/30/2020 19:45 by Dr Jhonatan Berry, .          Allergies   Allergen Reactions   • Bupropion Nausea Only   • Chantix [Varenicline]    • Sulfa Antibiotics    •  Azithromycin Rash   • Levofloxacin Rash   • Penicillins Rash       Scheduled meds:     aspirin 81 mg Oral Daily   brimonidine 1 drop Left Eye TID   carvedilol 12.5 mg Oral BID With Meals   dorzolamide 1 drop Left Eye BID   famotidine 20 mg Oral BID   fluticasone 2 spray Nasal BID   insulin lispro 2-7 Units Subcutaneous TID AC   lactobacillus acidophilus 1 capsule Oral Daily   levothyroxine 25 mcg Oral Daily   losartan 50 mg Oral BID   nicotine 1 patch Transdermal Q24H   piperacillin-tazobactam 3.375 g Intravenous Q12H   pravastatin 10 mg Oral Nightly   sertraline 25 mg Oral Daily   sevelamer 2,400 mg Oral TID With Meals   sodium chloride 10 mL Intravenous Q12H   timolol 1 drop Left Eye QAM       PRN meds:  •  acetaminophen **OR** acetaminophen **OR** acetaminophen  •  albumin human  •  dextrose  •  dextrose  •  glucagon (human recombinant)  •  ondansetron **OR** ondansetron  •  Pharmacy to dose vancomycin  •  Pharmacy to Dose Zosyn  •  sodium chloride  •  [COMPLETED] Insert peripheral IV **AND** sodium chloride  •  sodium chloride    Assessment/Plan       Pneumonia due to infectious organism    End stage renal failure on dialysis (CMS/[facility])    Glaucoma    Type 2 diabetes mellitus, with long-term current use of insulin (CMS/[facility])    Non-compliance with renal dialysis (CMS/[facility])    Tobacco abuse    Abnormal urine findings      Plan:  Possible pneumonia/COPD.  Patient denies any fever.  Continue vancomycin.  Change Zosyn to cefepime.  Continue supplemental oxygen.  Incentive spirometer.  Pulmonary toilet.  RT protocol.  X-ray at this admission is similar to previous chest x-ray from previous admission admission.  Chest x-ray-Dense retrocardiac consolidation reflects either atelectasis or  pneumonia in the left lower lobe, bilateral layering pleural effusions (right greater than left), mild cardiomegaly which is stable.    UTI.  Antibiotics as stated above.    Nausea/vomiting/diarrhea.  Patient planing the same thing last  admission.  Patient did not have any diarrhea symptoms during the course of last hospital stay.  Patient also was tolerating diet well at last hospital stay.  No sign of diarrhea overnight per nursing staff.    Chronic kidney failure on dialysis.  History noncompliance.  Patient used to get dialysis Tuesday/Thursday/Saturday.  Patient missed dialysis yesterday.  Consult nephrology for dialysis.  Continue Pepcid.  Continue Zofran PRN.    Hyperkalemia.  Resolved.    Hyponatremia.  Stable.  Per nephrology.    Anemia.  Anemia chronic disease.  Procrit.  Anemia panel.    Diabetes.  Insulin dependent.  Sliding scale.    Hypertension/hyperlipidemia.  Continue aspirin.  Continue Coreg.  Continue Cozaar.    Chronic smoker.  Patient smokes 1.5 pack of cigarette a day.  Nicotine patch.  Patient has no intention of quitting smoking.    Patient is legally blind.  Glaucoma.    Hypothyroidism.  Continue Synthroid.    DVT prophylaxis with SCD.    Nutrition.  Probiotic.  Regular-cardiac/consistent carb/renal diet.    Deconditioning.  PT and OT consult.    Blood cultures pending.  Urine cultures pending.    Discharge Planning: Fifth admission since 1/19/2020.  Patient refused rehab placement.  Consult  for possible evaluation of APS.    Lyndon Lockett MD   05/01/20   10:17

## 2020-05-02 ENCOUNTER — READMISSION MANAGEMENT (OUTPATIENT)
Dept: CALL CENTER | Facility: HOSPITAL | Age: 60
End: 2020-05-02

## 2020-05-02 VITALS
TEMPERATURE: 98.4 F | SYSTOLIC BLOOD PRESSURE: 172 MMHG | WEIGHT: 141.5 LBS | DIASTOLIC BLOOD PRESSURE: 64 MMHG | OXYGEN SATURATION: 96 % | RESPIRATION RATE: 18 BRPM | BODY MASS INDEX: 29.7 KG/M2 | HEART RATE: 78 BPM | HEIGHT: 58 IN

## 2020-05-02 LAB
ALBUMIN SERPL-MCNC: 3.3 G/DL (ref 3.5–5.2)
ALBUMIN/GLOB SERPL: 1.3 G/DL
ALP SERPL-CCNC: 240 U/L (ref 39–117)
ALT SERPL W P-5'-P-CCNC: 19 U/L (ref 1–33)
ANION GAP SERPL CALCULATED.3IONS-SCNC: 15 MMOL/L (ref 5–15)
AST SERPL-CCNC: 26 U/L (ref 1–32)
BACTERIA SPEC AEROBE CULT: NORMAL
BASOPHILS # BLD AUTO: 0.03 10*3/MM3 (ref 0–0.2)
BASOPHILS NFR BLD AUTO: 0.5 % (ref 0–1.5)
BILIRUB SERPL-MCNC: 0.2 MG/DL (ref 0.2–1.2)
BUN BLD-MCNC: 21 MG/DL (ref 8–23)
BUN/CREAT SERPL: 5.1 (ref 7–25)
CALCIUM SPEC-SCNC: 8.5 MG/DL (ref 8.6–10.5)
CHLORIDE SERPL-SCNC: 91 MMOL/L (ref 98–107)
CO2 SERPL-SCNC: 25 MMOL/L (ref 22–29)
CREAT BLD-MCNC: 4.15 MG/DL (ref 0.57–1)
DEPRECATED RDW RBC AUTO: 69 FL (ref 37–54)
EOSINOPHIL # BLD AUTO: 0.16 10*3/MM3 (ref 0–0.4)
EOSINOPHIL NFR BLD AUTO: 2.9 % (ref 0.3–6.2)
ERYTHROCYTE [DISTWIDTH] IN BLOOD BY AUTOMATED COUNT: 21.6 % (ref 12.3–15.4)
FERRITIN SERPL-MCNC: 1084 NG/ML (ref 13–150)
FOLATE SERPL-MCNC: 4.19 NG/ML (ref 4.78–24.2)
GFR SERPL CREATININE-BSD FRML MDRD: 11 ML/MIN/1.73
GFR SERPL CREATININE-BSD FRML MDRD: ABNORMAL ML/MIN/{1.73_M2}
GLOBULIN UR ELPH-MCNC: 2.6 GM/DL
GLUCOSE BLD-MCNC: 258 MG/DL (ref 65–99)
GLUCOSE BLDC GLUCOMTR-MCNC: 198 MG/DL (ref 70–130)
GLUCOSE BLDC GLUCOMTR-MCNC: 210 MG/DL (ref 70–130)
HCT VFR BLD AUTO: 28.8 % (ref 34–46.6)
HGB BLD-MCNC: 8.6 G/DL (ref 12–15.9)
IMM GRANULOCYTES # BLD AUTO: 0.03 10*3/MM3 (ref 0–0.05)
IMM GRANULOCYTES NFR BLD AUTO: 0.5 % (ref 0–0.5)
IRON 24H UR-MRATE: 67 MCG/DL (ref 37–145)
IRON SATN MFR SERPL: 21 % (ref 20–50)
LYMPHOCYTES # BLD AUTO: 1.03 10*3/MM3 (ref 0.7–3.1)
LYMPHOCYTES NFR BLD AUTO: 18.7 % (ref 19.6–45.3)
MCH RBC QN AUTO: 27 PG (ref 26.6–33)
MCHC RBC AUTO-ENTMCNC: 29.9 G/DL (ref 31.5–35.7)
MCV RBC AUTO: 90.6 FL (ref 79–97)
MONOCYTES # BLD AUTO: 0.38 10*3/MM3 (ref 0.1–0.9)
MONOCYTES NFR BLD AUTO: 6.9 % (ref 5–12)
NEUTROPHILS # BLD AUTO: 3.88 10*3/MM3 (ref 1.7–7)
NEUTROPHILS NFR BLD AUTO: 70.5 % (ref 42.7–76)
NRBC BLD AUTO-RTO: 0.4 /100 WBC (ref 0–0.2)
PLATELET # BLD AUTO: 285 10*3/MM3 (ref 140–450)
PMV BLD AUTO: 8.8 FL (ref 6–12)
POTASSIUM BLD-SCNC: 4.7 MMOL/L (ref 3.5–5.2)
PROT SERPL-MCNC: 5.9 G/DL (ref 6–8.5)
RBC # BLD AUTO: 3.18 10*6/MM3 (ref 3.77–5.28)
SODIUM BLD-SCNC: 131 MMOL/L (ref 136–145)
TIBC SERPL-MCNC: 323 MCG/DL (ref 298–536)
TRANSFERRIN SERPL-MCNC: 217 MG/DL (ref 200–360)
VANCOMYCIN SERPL-MCNC: 12.3 MCG/ML (ref 5–40)
VIT B12 BLD-MCNC: 659 PG/ML (ref 211–946)
WBC NRBC COR # BLD: 5.51 10*3/MM3 (ref 3.4–10.8)

## 2020-05-02 PROCEDURE — G0257 UNSCHED DIALYSIS ESRD PT HOS: HCPCS

## 2020-05-02 PROCEDURE — 82746 ASSAY OF FOLIC ACID SERUM: CPT | Performed by: FAMILY MEDICINE

## 2020-05-02 PROCEDURE — 94640 AIRWAY INHALATION TREATMENT: CPT

## 2020-05-02 PROCEDURE — 94799 UNLISTED PULMONARY SVC/PX: CPT

## 2020-05-02 PROCEDURE — G0378 HOSPITAL OBSERVATION PER HR: HCPCS

## 2020-05-02 PROCEDURE — 85025 COMPLETE CBC W/AUTO DIFF WBC: CPT | Performed by: FAMILY MEDICINE

## 2020-05-02 PROCEDURE — 80202 ASSAY OF VANCOMYCIN: CPT | Performed by: FAMILY MEDICINE

## 2020-05-02 PROCEDURE — 82728 ASSAY OF FERRITIN: CPT | Performed by: FAMILY MEDICINE

## 2020-05-02 PROCEDURE — 82607 VITAMIN B-12: CPT | Performed by: FAMILY MEDICINE

## 2020-05-02 PROCEDURE — 82962 GLUCOSE BLOOD TEST: CPT

## 2020-05-02 PROCEDURE — 63710000001 INSULIN LISPRO (HUMAN) PER 5 UNITS: Performed by: NURSE PRACTITIONER

## 2020-05-02 PROCEDURE — 80053 COMPREHEN METABOLIC PANEL: CPT | Performed by: FAMILY MEDICINE

## 2020-05-02 PROCEDURE — 83540 ASSAY OF IRON: CPT | Performed by: FAMILY MEDICINE

## 2020-05-02 PROCEDURE — 84466 ASSAY OF TRANSFERRIN: CPT | Performed by: FAMILY MEDICINE

## 2020-05-02 RX ORDER — L.ACID,PARA/B.BIFIDUM/S.THERM 8B CELL
1 CAPSULE ORAL DAILY
Qty: 30 CAPSULE | Refills: 2 | Status: ON HOLD | OUTPATIENT
Start: 2020-05-03 | End: 2021-01-01

## 2020-05-02 RX ORDER — CEFDINIR 300 MG/1
300 CAPSULE ORAL 2 TIMES DAILY
Qty: 14 CAPSULE | Refills: 0 | Status: SHIPPED | OUTPATIENT
Start: 2020-05-02 | End: 2020-05-09

## 2020-05-02 RX ORDER — CARVEDILOL 12.5 MG/1
12.5 TABLET ORAL 2 TIMES DAILY WITH MEALS
Qty: 60 TABLET | Refills: 2 | Status: ON HOLD | OUTPATIENT
Start: 2020-05-02 | End: 2021-01-01

## 2020-05-02 RX ORDER — ALBUTEROL SULFATE 2.5 MG/3ML
2.5 SOLUTION RESPIRATORY (INHALATION) ONCE
Status: COMPLETED | OUTPATIENT
Start: 2020-05-02 | End: 2020-05-02

## 2020-05-02 RX ADMIN — LOSARTAN POTASSIUM 50 MG: 50 TABLET, FILM COATED ORAL at 09:49

## 2020-05-02 RX ADMIN — SEVELAMER CARBONATE 2400 MG: 800 TABLET, FILM COATED ORAL at 09:48

## 2020-05-02 RX ADMIN — ALBUTEROL SULFATE 2.5 MG: 2.5 SOLUTION RESPIRATORY (INHALATION) at 05:00

## 2020-05-02 RX ADMIN — CARVEDILOL 12.5 MG: 6.25 TABLET, FILM COATED ORAL at 09:48

## 2020-05-02 RX ADMIN — BRIMONIDINE TARTRATE 1 DROP: 2 SOLUTION OPHTHALMIC at 16:57

## 2020-05-02 RX ADMIN — SERTRALINE 25 MG: 50 TABLET, FILM COATED ORAL at 09:49

## 2020-05-02 RX ADMIN — INSULIN LISPRO 2 UNITS: 100 INJECTION, SOLUTION INTRAVENOUS; SUBCUTANEOUS at 16:57

## 2020-05-02 RX ADMIN — TIMOLOL MALEATE 1 DROP: 5 SOLUTION/ DROPS OPHTHALMIC at 06:42

## 2020-05-02 RX ADMIN — INSULIN LISPRO 3 UNITS: 100 INJECTION, SOLUTION INTRAVENOUS; SUBCUTANEOUS at 09:48

## 2020-05-02 RX ADMIN — DORZOLAMIDE HYDROCHLORIDE 1 DROP: 20 SOLUTION/ DROPS OPHTHALMIC at 09:48

## 2020-05-02 RX ADMIN — BRIMONIDINE TARTRATE 1 DROP: 2 SOLUTION OPHTHALMIC at 09:47

## 2020-05-02 RX ADMIN — LEVOTHYROXINE SODIUM 25 MCG: 25 TABLET ORAL at 06:42

## 2020-05-02 RX ADMIN — FAMOTIDINE 20 MG: 20 TABLET, FILM COATED ORAL at 09:49

## 2020-05-02 RX ADMIN — FLUTICASONE PROPIONATE 2 SPRAY: 50 SPRAY, METERED NASAL at 09:47

## 2020-05-02 RX ADMIN — Medication 1 CAPSULE: at 09:49

## 2020-05-02 RX ADMIN — ASPIRIN 81 MG: 81 TABLET ORAL at 09:49

## 2020-05-02 NOTE — PROGRESS NOTES
Nephrology ([facility]) Progress Note      Patient:  Mini Gentile  YOB: 1960  Date of Service: 5/2/2020  MRN: [id number]   Acct: [id number]   Primary Care Physician: Bharati Dahl APRN  Advance Directive:   Code Status and Medical Interventions:   Ordered at: 04/30/20 2226     Level Of Support Discussed With:    Patient     Code Status:    CPR     Medical Interventions (Level of Support Prior to Arrest):    Full     Admit Date: 4/30/2020       Hospital Day: 0  Referring Provider: Federico Clancy DO      Patient personally seen and examined.  Complete chart including Consults, Notes, Operative Reports, Labs, Cardiology, and Radiology studies reviewed as able.        Subjective:  Mini Gentile is a 60 y.o. female  whom we were consulted for end stage renal disease.  Maintenance hemodialysis Tuesday Thursday Saturday at [facility]. History of type 2 diabetes, hypertension, ongoing tobacco abuse. Multiple recent admissions; in fact--patient was just discharged from this facility on 4/29. Ongoing pattern of feeling too sick to go to outpatient dialysis; she did not go to her treatment on 4/30 due to diarrhea and abdominal pain. Instead she presented to ER later the same evening with nausea/vomiting, abdominal pain.       Today, tolerated dialysis well yesterday.  Denies chest pain, shortness of air, nausea vomiting.  Hopeful for discharge today    Dialysis   Pt was seen on RRT  Modality: Hemodialysis  Access: Arterial Venous Fistula  Location: left upper  QB: 400  QD: 600  UF: 1000         Allergies:  Bupropion; Chantix [varenicline]; Sulfa antibiotics; Azithromycin; Levofloxacin; and Penicillins    Home Meds:  Medications Prior to Admission   Medication Sig Dispense Refill Last Dose   • albuterol sulfate  (90 Base) MCG/ACT inhaler Inhale 2 puffs Every 4 (Four) Hours As Needed for Wheezing. 18 g 0 4/30/2020 at Unknown time   • aspirin 81 MG EC  tablet Take 81 mg by mouth Daily.   4/30/2020 at Unknown time   • bismuth subsalicylate (PEPTO BISMOL) 262 MG chewable tablet Chew 262 mg Daily As Needed for Indigestion.   4/30/2020 at Unknown time   • brimonidine (ALPHAGAN) 0.2 % ophthalmic solution Administer 1 drop into the left eye 3 (Three) Times a Day.   4/30/2020 at Unknown time   • carvedilol (COREG) 6.25 MG tablet Take 6.25 mg by mouth 2 (Two) Times a Day With Meals.   4/30/2020 at Unknown time   • cholecalciferol (VITAMIN D3) 1000 units tablet Take 2,000 Units by mouth Daily.   4/30/2020 at Unknown time   • dorzolamide (TRUSOPT) 2 % ophthalmic solution Administer 1 drop into the left eye 2 (Two) Times a Day.   4/30/2020 at Unknown time   • famotidine (PEPCID) 20 MG tablet Take 1 tablet by mouth 2 (Two) Times a Day. 60 tablet 2 4/30/2020 at Unknown time   • fluticasone (FLONASE) 50 MCG/ACT nasal spray 2 sprays into the nostril(s) as directed by provider 2 (Two) Times a Day.   4/30/2020 at Unknown time   • insulin glargine (LANTUS) 100 UNIT/ML injection Inject 10 Units under the skin Every Night.   4/29/2020 at Unknown time   • Latanoprostene Bunod (Vyzulta) 0.024 % solution Administer 1 drop into the left eye Every Night. Left eye    4/29/2020 at Unknown time   • levothyroxine (SYNTHROID, LEVOTHROID) 25 MCG tablet Take 1 tablet by mouth Daily. 30 tablet 2 4/30/2020 at Unknown time   • losartan (COZAAR) 50 MG tablet Take 50 mg by mouth 2 (Two) Times a Day.   4/30/2020 at Unknown time   • Netarsudil Dimesylate (RHOPRESSA) 0.02 % solution Administer 1 drop into the left eye Every Night.   4/29/2020 at Unknown time   • nicotine (NICODERM CQ) 21 MG/24HR patch Place 1 patch on the skin as directed by provider Daily. 30 patch 2 4/30/2020 at Unknown time   • ondansetron (ZOFRAN) 4 MG tablet Take 1 tablet by mouth Every 6 (Six) Hours As Needed for Nausea or Vomiting. 24 tablet 2 4/30/2020 at Unknown time   • pravastatin (PRAVACHOL) 10 MG tablet Take 10 mg by mouth  Every Night.   4/29/2020 at Unknown time   • sertraline (ZOLOFT) 25 MG tablet Take 25 mg by mouth Daily.   4/30/2020 at Unknown time   • sevelamer (RENAGEL) 800 MG tablet Take 2,400 mg by mouth 3 (Three) Times a Day With Meals.   4/30/2020 at Unknown time   • timolol (TIMOPTIC) 0.5 % ophthalmic solution Administer 1 drop into the left eye Every Morning.   4/30/2020 at Unknown time   • [DISCONTINUED] carvedilol (COREG) 12.5 MG tablet Take 1 tablet by mouth 2 (Two) Times a Day With Meals. 60 tablet 0 4/30/2020 at Unknown time       Medicines:  Current Facility-Administered Medications   Medication Dose Route Frequency Provider Last Rate Last Dose   • acetaminophen (TYLENOL) tablet 650 mg  650 mg Oral Q4H PRN Catina Villegas APRN   650 mg at 05/01/20 0334    Or   • acetaminophen (TYLENOL) 160 MG/5ML solution 650 mg  650 mg Oral Q4H PRN Catina Villegas APRN        Or   • acetaminophen (TYLENOL) suppository 650 mg  650 mg Rectal Q4H PRN Catina Villegas, APRAC       • albumin human 25 % IV SOLN 12.5 g  12.5 g Intravenous PRN Ten Boyd MD       • aspirin EC tablet 81 mg  81 mg Oral Daily Catina Villegas APRN   81 mg at 05/02/20 0949   • brimonidine (ALPHAGAN) 0.2 % ophthalmic solution 1 drop  1 drop Left Eye TID Catina Villegas APRN   1 drop at 05/02/20 0947   • carvedilol (COREG) tablet 12.5 mg  12.5 mg Oral BID With Meals Catina Villegas APRN   12.5 mg at 05/02/20 0948   • cefepime (MAXIPIME) 1 g/100 mL 0.9% NS IVPB (mbp)  1 g Intravenous Q24H Lyndon Lockett MD       • dextrose (D50W) 25 g/ 50mL Intravenous Solution 25 g  25 g Intravenous Q15 Min PRN Catina Villegas, APRN       • dextrose (GLUTOSE) oral gel 15 g  15 g Oral Q15 Min PRN Catina Villegas APRN       • dorzolamide (TRUSOPT) 2 % ophthalmic solution 1 drop  1 drop Left Eye BID Catina Villegas APRN   1 drop at 05/02/20 0948   • epoetin vika-epbx (RETACRIT) injection 5,000 Units  5,000 Units Subcutaneous Once per day on Mon  Wed Fri Lyndon Lockett MD   5,000 Units at 05/01/20 1231   • famotidine (PEPCID) tablet 20 mg  20 mg Oral Daily Lyndon Lockett MD   20 mg at 05/02/20 0949   • fluticasone (FLONASE) 50 MCG/ACT nasal spray 2 spray  2 spray Nasal BID Catina Villegas, APRN   2 spray at 05/02/20 0947   • glucagon (human recombinant) (GLUCAGEN DIAGNOSTIC) injection 1 mg  1 mg Subcutaneous Q15 Min PRN Catina Villegas, APRN       • insulin lispro (humaLOG) injection 2-7 Units  2-7 Units Subcutaneous TID AC Catina Villegas APRN   3 Units at 05/02/20 0948   • lactobacillus acidophilus (RISAQUAD) capsule 1 capsule  1 capsule Oral Daily Catina Villegas, APRN   1 capsule at 05/02/20 0949   • levothyroxine (SYNTHROID, LEVOTHROID) tablet 25 mcg  25 mcg Oral Daily Catina Villegas APRN   25 mcg at 05/02/20 0642   • losartan (COZAAR) tablet 50 mg  50 mg Oral BID Catina Villegas APRN   50 mg at 05/02/20 0949   • nicotine (NICODERM CQ) 21 MG/24HR patch 1 patch  1 patch Transdermal Q24H Catina Villegas APRN   1 patch at 05/01/20 2233   • ondansetron (ZOFRAN) tablet 4 mg  4 mg Oral Q6H PRN Catina Villegas, APRN        Or   • ondansetron (ZOFRAN) injection 4 mg  4 mg Intravenous Q6H PRN Catina Villegas, APRN       • Pharmacy to dose vancomycin   Does not apply Continuous PRN Catina Villegas, APRN       • pravastatin (PRAVACHOL) tablet 10 mg  10 mg Oral Nightly Catina Villegas APRN   10 mg at 05/01/20 2203   • sertraline (ZOLOFT) tablet 25 mg  25 mg Oral Daily Catina Villegas, APRN   25 mg at 05/02/20 0949   • sevelamer (RENVELA) tablet 2,400 mg  2,400 mg Oral TID With Meals Catina Villegas APRN   2,400 mg at 05/02/20 0948   • sodium chloride 0.9 % bolus 100 mL  100 mL Intravenous PRN Ten Boyd MD       • sodium chloride 0.9 % flush 10 mL  10 mL Intravenous PRN Catina Villegas APRN       • sodium chloride 0.9 % flush 10 mL  10 mL Intravenous Q12H Catina Villegas APRN   10 mL at 05/01/20 2204   • sodium  chloride 0.9 % flush 10 mL  10 mL Intravenous PRN Catina Villegas APRN       • timolol (TIMOPTIC) 0.5 % ophthalmic solution 1 drop  1 drop Left Eye QAM Catina Villegas APRN   1 drop at 20 0642       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)    • End stage renal disease on dialysis (CMS/[facility])    • Eustachian tube dysfunction    • Glaucoma    • HTN (hypertension)    • Mucoid otitis media    • Noncompliance of patient with renal dialysis (CMS/[facility])    • Tobacco abuse        Past Surgical History:  Past Surgical History:   Procedure Laterality Date   • ARTERIOVENOUS FISTULA     • CATARACT EXTRACTION WITH INTRAOCULAR LENS IMPLANT     •  SECTION     • CHOLECYSTECTOMY     • MYRINGOTOMY W/ TUBES Bilateral    • MYRINGOTOMY W/ TUBES Right    • TUBAL ABDOMINAL LIGATION         Family History  Family History   Problem Relation Age of Onset   • Heart disease Mother    • Diabetes Father        Social History  Social History     Socioeconomic History   • Marital status: Single     Spouse name: Not on file   • Number of children: Not on file   • Years of education: Not on file   • Highest education level: Not on file   Tobacco Use   • Smoking status: Current Every Day Smoker     Packs/day: 2.00     Years: 6.00     Pack years: 12.00     Types: Cigarettes   • Smokeless tobacco: Never Used   Substance and Sexual Activity   • Alcohol use: No   • Drug use: No   • Sexual activity: Defer         Review of Systems:  History obtained from chart review and the patient  General ROS: No fever or chills  Respiratory ROS: No cough, shortness of breath, wheezing  Cardiovascular ROS: No chest pain or palpitations  Gastrointestinal ROS: No abdominal pain or melena  Genito-Urinary ROS: No dysuria or hematuria    Objective:  Patient Vitals for the past 24 hrs:   BP Temp Temp src Pulse Resp SpO2 Weight   20  0759 172/64 98.4 °F (36.9 °C) Oral 78 18 96 % --   05/02/20 0455 -- -- -- 76 -- -- --   05/02/20 0448 -- -- -- 79 16 97 % --   05/02/20 0017 150/62 98.9 °F (37.2 °C) Oral 80 16 91 % --   05/01/20 2101 155/62 99.4 °F (37.4 °C) Oral 79 16 99 % 64.2 kg (141 lb 8 oz)   05/01/20 1640 158/64 98.6 °F (37 °C) Oral 85 16 95 % --   05/01/20 1212 -- -- -- 92 18 92 % --   05/01/20 1153 (!) 187/85 -- -- 89 -- -- --       Intake/Output Summary (Last 24 hours) at 5/2/2020 1058  Last data filed at 5/1/2020 2101  Gross per 24 hour   Intake 480 ml   Output 100 ml   Net 380 ml     General: awake/alert   Chest:  clear to auscultation bilaterally without respiratory distress  CVS: regular rate and rhythm  Abdominal: soft, nontender, positive bowel sounds  Extremities: no cyanosis or edema  Skin: warm and dry without rash      Labs:  Results from last 7 days   Lab Units 05/02/20 0353 05/01/20 0446 04/30/20 1914   WBC 10*3/mm3 5.51 5.92 7.64   HEMOGLOBIN g/dL 8.6* 9.0* 9.4*   HEMATOCRIT % 28.8* 29.7* 31.1*   PLATELETS 10*3/mm3 285 306 318         Results from last 7 days   Lab Units 05/02/20 0353 05/01/20 0446 04/30/20 1914   SODIUM mmol/L 131* 125* 124*   POTASSIUM mmol/L 4.7 5.2 5.3*   CHLORIDE mmol/L 91* 86* 83*   CO2 mmol/L 25.0 23.0 23.0   BUN mg/dL 21 28* 28*   CREATININE mg/dL 4.15* 5.37* 5.02*   CALCIUM mg/dL 8.5* 9.2 9.5   BILIRUBIN mg/dL 0.2 0.3 0.3   ALK PHOS U/L 240* 282* 324*   ALT (SGPT) U/L 19 25 30   AST (SGOT) U/L 26 27 43*   GLUCOSE mg/dL 258* 138* 185*       Radiology:   Imaging Results (Last 72 Hours)     Procedure Component Value Units Date/Time    XR Chest 1 View [[id number]] Collected:  04/30/20 1943     Updated:  04/30/20 1948    Narrative:       XR CHEST 1 VW- 4/30/2020 7:06 PM CDT     HISTORY: wheezing       COMPARISON: Chest exam dated 04/27/2020.     FINDINGS:      There is a dense retrocardiac consolidation with partially obscured left  hemidiaphragm. Notable blunting of the left costophrenic  angle.  Additional small to moderate sized layering right pleural effusion.  Upper and midlung fields are clear. Mild cardiomegaly which is stable.  The pulmonary vasculature are nondilated. No pneumothorax. Reidentified  vascular stent projecting over the upper chest. No acute bony  abnormality.       Impression:       1. Dense retrocardiac consolidation reflects either atelectasis or  pneumonia in the left lower lobe. There are bilateral layering pleural  effusions (right greater than left).  2. Mild cardiomegaly which is stable.        This report was finalized on 04/30/2020 19:45 by Dr Jhonatan Berry, .          Culture:  Blood Culture   Date Value Ref Range Status   04/30/2020 No growth at 24 hours  Preliminary   04/30/2020 No growth at 24 hours  Preliminary     Urine Culture   Date Value Ref Range Status   04/30/2020 <25,000 CFU/mL Mixed Janeth Isolated  Final         Assessment   End-stage renal disease  Diabetes type 2 diabetic nephropathy  Hypertension  Anemia of chronic kidney disease  Noncompliance with dialysis  Volume overload  Hyponatremia    Plan:  Dialysis today resume schedule tomorrow  Monitor labs  Discussed with patient, nursing  Encourage compliance      Adi Gonzalez MD  5/2/2020  10:58

## 2020-05-02 NOTE — DISCHARGE PLACEMENT REQUEST
"Mini Peña (60 y.o. Female)     Date of Birth Social Security Number Address Home Phone MRN    [id number]  [id number] [place] [id number] [phone] [id number]    Church Marital Status          Other Single       Admission Date Admission Type Admitting Provider Attending Provider Department, Room/Bed    4/30/20 Emergency Lyndon Lockett MD Truong, Khai C, MD [facility] 4B, 406/1    Discharge Date Discharge Disposition Discharge Destination         Home or Self Care              Attending Provider:  Lyndon Lockett MD    Allergies:  Bupropion, Chantix [Varenicline], Sulfa Antibiotics, Azithromycin, Levofloxacin, Penicillins    Isolation:  None   Infection:  C.difficile (rule out) (05/01/20)   Code Status:  CPR    Ht:  147.3 cm (58\")   Wt:  64.2 kg (141 lb 8 oz)    Admission Cmt:  None   Principal Problem:  Pneumonia due to infectious organism [J18.9]                 Active Insurance as of 4/30/2020     Primary Coverage     Payor Plan Insurance Group Employer/Plan Group    MEDICARE MEDICARE A & B      Payor Plan Address Payor Plan Phone Number Payor Plan Fax Number Effective Dates    [address] [phone]  7/1/2017 - None Entered    [facility] [id number]       Subscriber Name Subscriber Birth Date Member ID       MINI PEÑA 1960 [id number]           Secondary Coverage     Payor Plan Insurance Group Employer/Plan Group    [employer]LL[employer] WELL[place]ARE MEDICAID      Payor Plan Address Payor Plan Phone Number Payor Plan Fax Number Effective Dates    [address] [address] [phone]  11/4/2016 - None Entered    [facility] [id number]       Subscriber Name Subscriber Birth Date Member ID       MINI PEÑA 1960 [id number]                 Emergency Contacts      (Rel.) Home Phone Work Phone Mobile Phone    VamshihiJulio Cesar mariscal (Son) -- -- [phone]    Erica Bowling (Daughter) -- -- [phone]               History & Physical      Federico Clancy DO at 04/30/20 " 2202              [facility]  HISTORY AND PHYSICAL    Date of Admission: 4/30/2020  Primary Care Physician: Bharati Dahl APRN    Subjective     Chief Complaint: diarrhea, weakness, N/V, missed HD today    History of Present Illness  Mini Gentile is a 59-year-old female with a past medical history of end-stage kidney disease on hemodialysis -multiple documentation notes regarding noncompliance with dialysis; insulin-dependent diabetes, glaucoma left, hypertension, hypothyroidism, near blindness, tobacco abuse.  Patient has orders for hemodialysis Tuesday/Thursday/Saturday.  This is patient's fifth admission since 1/19/2020, having again missed dialysis today due to nausea/vomiting/abdominal pain/diarrhea patient states pain has resolved.  Patient was discharged from this facility yesterday.  ER work-up reveals abnormal chemistry, urinalysis was obtained which is also abnormal however will await urine culture results before continuing antibiotic therapy.  Chest x-ray reveals left lower lobe consolidation with concerns for healthcare associated pneumonia.  Patient denies fever, worsening shortness of breath (chronic), fever, patient still smokes 1.5 packs of cigarettes per day. Patient reports she is just too sick to go to dialysis.  I did discuss possible need for nursing home placement as due to chronic illnesses and inability to care for self this may be her best option to improve.  Currently she is stating this will not happen however I feel Adult Protective Services may need to be consulted for assessment at this time.  Patient is admitted as observation for further evaluation treatment.    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)    • End stage renal disease on dialysis (CMS/[facility] located [facility])    • Eustachian tube dysfunction    • Glaucoma    • HTN (hypertension)    • Mucoid otitis media    • Noncompliance of patient with renal dialysis (CMS/[facility])    • Tobacco abuse        Past Surgical History:   Past Surgical History:   Procedure Laterality Date   • ARTERIOVENOUS FISTULA     • CATARACT EXTRACTION WITH INTRAOCULAR LENS IMPLANT     •  SECTION     • CHOLECYSTECTOMY     • MYRINGOTOMY W/ TUBES Bilateral    • MYRINGOTOMY W/ TUBES Right    • TUBAL ABDOMINAL LIGATION         Family History: family history includes Diabetes in her father; Heart disease in her mother.    Social History:  reports that she has been smoking cigarettes. She has a 12.00 pack-year smoking history. She has never used smokeless tobacco. She reports that she does not drink alcohol or use drugs.    Code Status: Full, if unable speak for self Sundeep Bowling will speak for her      Allergies:  Allergies   Allergen Reactions   • Bupropion Nausea Only   • Chantix [Varenicline]    • Sulfa Antibiotics    • Azithromycin Rash   • Levofloxacin Rash   • Penicillins Rash       Medications:  Prior to Admission medications    Medication Sig Start Date End Date Taking? Authorizing Provider   albuterol sulfate  (90 Base) MCG/ACT inhaler Inhale 2 puffs Every 4 (Four) Hours As Needed for Wheezing. 20  Yes Sundeep Aldridge MD   aspirin 81 MG EC tablet Take 81 mg by mouth Daily.   Yes Steve Warren MD   bismuth subsalicylate (PEPTO BISMOL) 262 MG chewable tablet Chew 262 mg Daily As Needed for Indigestion.   Yes Steve Warren MD   brimonidine (ALPHAGAN) 0.2 % ophthalmic solution Administer 1 drop into the left eye 3 (Three) Times a Day.   Yes Steve Warren MD   calcitriol (ROCALTROL) 0.25 MCG capsule Take 0.25 mcg by mouth Take As Directed. Only given on dialysis days (Tuesday, Thursday, Saturday)   Yes Steve Warren MD   carvedilol (COREG)  12.5 MG tablet Take 1 tablet by mouth 2 (Two) Times a Day With Meals. 1/21/20  Yes Sundeep Aldridge MD   cholecalciferol (VITAMIN D3) 1000 units tablet Take 2,000 Units by mouth Daily.   Yes ProviderSteve MD   dorzolamide (TRUSOPT) 2 % ophthalmic solution Administer 1 drop into the left eye 2 (Two) Times a Day.   Yes Steve Warren MD   famotidine (PEPCID) 20 MG tablet Take 1 tablet by mouth 2 (Two) Times a Day. 4/29/20  Yes Lyndon Lockett MD   fluticasone (FLONASE) 50 MCG/ACT nasal spray 2 sprays into the nostril(s) as directed by provider 2 (Two) Times a Day.   Yes Steve Warren MD   homatropine 5 % ophthalmic solution Administer 1 drop into the left eye Daily.   Yes ProviderSteve MD   insulin glargine (LANTUS) 100 UNIT/ML injection Inject 10 Units under the skin Every Night.   Yes ProviderSteve MD   Latanoprostene Bunod (Vyzulta) 0.024 % solution Apply 1 drop to eye(s) as directed by provider Every Night. Left eye   Yes ProviderSteve MD   levothyroxine (SYNTHROID, LEVOTHROID) 25 MCG tablet Take 1 tablet by mouth Daily. 2/9/20  Yes Lyndon Lockett MD   losartan (COZAAR) 50 MG tablet Take 50 mg by mouth 2 (Two) Times a Day.   Yes ProviderSteve MD   Netarsudil Dimesylate (RHOPRESSA) 0.02 % solution Administer 1 drop into the left eye Every Night.   Yes ProvidertSeve MD   nicotine (NICODERM CQ) 21 MG/24HR patch Place 1 patch on the skin as directed by provider Daily. 4/30/20  Yes Lyndon Lockett MD   ondansetron (ZOFRAN) 4 MG tablet Take 1 tablet by mouth Every 6 (Six) Hours As Needed for Nausea or Vomiting. 4/29/20  Yes Lyndon Lockett MD   pravastatin (PRAVACHOL) 10 MG tablet Take 10 mg by mouth Every Night.   Yes ProviderSteve MD   sertraline (ZOLOFT) 25 MG tablet Take 25 mg by mouth Daily.   Yes ProviderSteve MD   sevelamer (RENAGEL) 800 MG tablet Take 2,400 mg by mouth 3 (Three) Times a Day With Meals.   Yes Provider,  "MD Steve   timolol (TIMOPTIC) 0.5 % ophthalmic solution Administer 1 drop into the left eye Every Morning.   Yes Provider, MD Steve       Objective     /66   Pulse 74   Temp 98.1 °F (36.7 °C)   Resp 15   Ht 142.2 cm (56\")   Wt 64.9 kg (143 lb)   SpO2 93%   BMI 32.06 kg/m²    Physical Exam   Constitutional: She is oriented to person, place, and time. She appears well-developed and well-nourished. No distress.   HENT:   Head: Normocephalic and atraumatic.   Eyes: Pupils are equal, round, and reactive to light. Conjunctivae and EOM are normal. No scleral icterus.   Neck: Normal range of motion. Neck supple. No JVD present. No tracheal deviation present.   Cardiovascular: Normal rate, regular rhythm, normal heart sounds and intact distal pulses. Exam reveals no gallop.   No murmur heard.  Pulmonary/Chest: Effort normal. No respiratory distress. She has wheezes ( Expiratory). She has no rales.   Abdominal: Soft. Bowel sounds are normal. She exhibits no distension. There is no tenderness. There is no guarding.   Musculoskeletal: Normal range of motion. She exhibits no edema.   Neurological: She is alert and oriented to person, place, and time.   Skin: Skin is warm and dry. No rash noted. She is not diaphoretic. No erythema. No pallor.   Psychiatric: She has a normal mood and affect. Her behavior is normal.   Vitals reviewed.      Pertinent Data:   Lab Results (last 72 hours)     Procedure Component Value Units Date/Time    Urine Culture - Urine, Urine, Clean Catch [[id number]] Collected:  04/30/20 1935    Specimen:  Urine, Clean Catch Updated:  04/30/20 2206    Urinalysis With Microscopic If Indicated (No Culture) - Urine, Clean Catch [[id number]]  (Abnormal) Collected:  04/30/20 1935    Specimen:  Urine, Clean Catch Updated:  04/30/20 1952     Color, UA Yellow     Appearance, UA Clear     pH, UA 7.0     Specific Gravity, UA 1.016     Glucose,  mg/dL (1+)     Ketones, UA Negative     " Bilirubin, UA Negative     Blood, UA Moderate (2+)     Protein, UA >=300 mg/dL (3+)     Leuk Esterase, UA Moderate (2+)     Nitrite, UA Negative     Urobilinogen, UA 0.2 E.U./dL    Urinalysis, Microscopic Only - Urine, Clean Catch [[id number]]  (Abnormal) Collected:  04/30/20 1935    Specimen:  Urine, Clean Catch Updated:  04/30/20 1952     RBC, UA 21-30 /HPF      WBC, UA 21-30 /HPF      Bacteria, UA 1+ /HPF      Squamous Epithelial Cells, UA 3-6 /HPF      Hyaline Casts, UA 0-2 /LPF      Methodology Automated Microscopy    Procalcitonin [[id number]]  (Abnormal) Collected:  04/30/20 1914    Specimen:  Blood Updated:  04/30/20 1950     Procalcitonin 0.39 ng/mL     Comprehensive Metabolic Panel [[id number]]  (Abnormal) Collected:  04/30/20 1914    Specimen:  Blood Updated:  04/30/20 1948     Glucose 185 mg/dL      BUN 28 mg/dL      Creatinine 5.02 mg/dL      Sodium 124 mmol/L      Potassium 5.3 mmol/L      Chloride 83 mmol/L      CO2 23.0 mmol/L      Calcium 9.5 mg/dL      Total Protein 6.8 g/dL      Albumin 3.80 g/dL      ALT (SGPT) 30 U/L      AST (SGOT) 43 U/L      Comment: Specimen hemolyzed.  Results may be affected.        Alkaline Phosphatase 324 U/L      Total Bilirubin 0.3 mg/dL      eGFR Non African Amer 9 mL/min/1.73      Comment: <15 Indicative of kidney failure.        eGFR   Amer --     Comment: <15 Indicative of kidney failure.        Globulin 3.0 gm/dL      A/G Ratio 1.3 g/dL      BUN/Creatinine Ratio 5.6     Anion Gap 18.0 mmol/L     Lactic Acid, Plasma [[id number]]  (Normal) Collected:  04/30/20 1914    Specimen:  Blood Updated:  04/30/20 1941     Lactate 1.0 mmol/L     Magnesium [[id number]]  (Normal) Collected:  04/30/20 1914    Specimen:  Blood Updated:  04/30/20 1938     Magnesium 2.4 mg/dL     Blood Culture - Blood, Arm, Right [[id number]] Collected:  04/30/20 1914    Specimen:  Blood from Arm, Right Updated:  04/30/20 1934    Blood Culture - Blood, Arm, Right [[id number]] Collected:  04/30/20  1915    Specimen:  Blood from Arm, Right Updated:  04/30/20 1934    CBC Auto Differential [[id number]]  (Abnormal) Collected:  04/30/20 1914    Specimen:  Blood Updated:  04/30/20 1924     WBC 7.64 10*3/mm3      RBC 3.46 10*6/mm3      Hemoglobin 9.4 g/dL      Hematocrit 31.1 %      MCV 89.9 fL      MCH 27.2 pg      MCHC 30.2 g/dL      RDW 21.3 %      RDW-SD 65.5 fl      MPV 8.9 fL      Platelets 318 10*3/mm3      Neutrophil % 81.1 %      Lymphocyte % 11.3 %      Monocyte % 3.9 %      Eosinophil % 2.7 %      Basophil % 0.3 %      Immature Grans % 0.7 %      Neutrophils, Absolute 6.20 10*3/mm3      Lymphocytes, Absolute 0.86 10*3/mm3      Monocytes, Absolute 0.30 10*3/mm3      Eosinophils, Absolute 0.21 10*3/mm3      Basophils, Absolute 0.02 10*3/mm3      Immature Grans, Absolute 0.05 10*3/mm3      nRBC 0.4 /100 WBC         Imaging Results (Last 24 Hours)     Procedure Component Value Units Date/Time    XR Chest 1 View [[id number]] Collected:  04/30/20 1943     Updated:  04/30/20 1948    Narrative:       XR CHEST 1 VW- 4/30/2020 7:06 PM CDT     HISTORY: wheezing       COMPARISON: Chest exam dated 04/27/2020.     FINDINGS:      There is a dense retrocardiac consolidation with partially obscured left  hemidiaphragm. Notable blunting of the left costophrenic angle.  Additional small to moderate sized layering right pleural effusion.  Upper and midlung fields are clear. Mild cardiomegaly which is stable.  The pulmonary vasculature are nondilated. No pneumothorax. Reidentified  vascular stent projecting over the upper chest. No acute bony  abnormality.       Impression:       1. Dense retrocardiac consolidation reflects either atelectasis or  pneumonia in the left lower lobe. There are bilateral layering pleural  effusions (right greater than left).  2. Mild cardiomegaly which is stable.        This report was finalized on 04/30/2020 19:45 by Dr Jhonatan Berry, .          I have personally reviewed and interpreted the  radiology studies obtained at time of admission.     Assessment / Plan     Assessment:   Active Hospital Problems    Diagnosis   • **Pneumonia due to infectious organism   • Abnormal urine findings   • Tobacco abuse   • Non-compliance with renal dialysis (CMS/[facility])   • Type 2 diabetes mellitus, with long-term current use of insulin (CMS/[facility])   • End stage renal failure on dialysis (CMS/[facility])   • Glaucoma       Plan:   1.  Admit as observation  2.  Pharmacy to dose vancomycin and Zosyn  3.  Incentive spirometry, supplemental oxygen, pulmonary toileting, RT to initiate breathing treatment protocol  4.  Monitor glucose before meals with sliding scale coverage  5.  Consult nephrology, patient will need dialysis tomorrow usually takes on Tuesday/Thursday/Saturday  6.  Consult  for nursing home placement if patient refuses APC evaluation may be needed  7.  DVT prophylaxis with SCDs  8.  Home medications reviewed and restarted as appropriate  9.  Labs in a.m.    I discussed the patient's findings and my recommendations with: Federico Clancy DO  Time spent: 35 minutes    Plan discussed with NP and agree.  Heart regular. Discussed with patient lack of self care and she refuses NHP.  Wheezing bilateral bases.     Patient seen and examined by me on 4/3/2020 10:05PM.    ANTHONY Galeano  04/30/20   22:42    Electronically signed by Federico Clancy DO at 05/01/20 0326          Physician Progress Notes (most recent note)      Lyndon Lockett MD at 05/01/20 1017              [facility] Medicine Services  INPATIENT PROGRESS NOTE    Length of Stay: 0  Date of Admission: 4/30/2020  Primary Care Physician: Bharati Dahl APRN    Subjective   Chief Complaint: Nausea/vomiting/diarrhea.  Dialysis for renal failure.  Hyponatremia.  Questionable UTI.  Possible pneumonia.    HPI   Chest pain with the same as last time upon discharge.  There is nothing new here.  Waiting for blood culture  come back.  Patient just finished dialysis and very fatigued from it.  No sign of diarrhea overnight.  No sign nausea vomiting overnight and today.  T-max 98.4.  T-current 98.4.    Review of Systems   Constitutional: Positive for activity change, appetite change and fatigue. Negative for chills and fever.   HENT: Negative for hearing loss, nosebleeds, tinnitus and trouble swallowing.    Eyes: Negative for visual disturbance.   Respiratory: Negative for cough, chest tightness, shortness of breath and wheezing.    Cardiovascular: Negative for chest pain, palpitations and leg swelling.   Gastrointestinal: Positive for nausea. Negative for abdominal distention, abdominal pain, blood in stool, constipation, diarrhea and vomiting.   Endocrine: Negative for cold intolerance, heat intolerance, polydipsia, polyphagia and polyuria.   Genitourinary: Negative for decreased urine volume, difficulty urinating, dysuria, flank pain, frequency and hematuria.   Musculoskeletal: Positive for arthralgias, gait problem and myalgias. Negative for joint swelling.   Skin: Negative for rash.   Allergic/Immunologic: Negative for immunocompromised state.   Neurological: Positive for weakness. Negative for dizziness, syncope, light-headedness and headaches.   Hematological: Negative for adenopathy. Does not bruise/bleed easily.   Psychiatric/Behavioral: Negative for confusion and sleep disturbance. The patient is not nervous/anxious.           All pertinent negatives and positives are as above. All other systems have been reviewed and are negative unless otherwise stated.     Objective    Temp:  [97.1 °F (36.2 °C)-98.4 °F (36.9 °C)] 98.4 °F (36.9 °C)  Heart Rate:  [69-83] 69  Resp:  [15-18] 18  BP: (114-164)/(62-91) 152/63    Intake/Output Summary (Last 24 hours) at 5/1/2020 1017  Last data filed at 5/1/2020 0403  Gross per 24 hour   Intake 170 ml   Output 100 ml   Net 70 ml     Physical Exam   Constitutional: She is oriented to person, place,  and time. She appears well-developed.   HENT:   Head: Normocephalic.   Eyes: Pupils are equal, round, and reactive to light. Conjunctivae are normal.   Neck: Neck supple. No JVD present.   Cardiovascular: Normal rate, regular rhythm, normal heart sounds and intact distal pulses. Exam reveals no gallop and no friction rub.   No murmur heard.  Pulmonary/Chest: No respiratory distress. She has no wheezes. She has no rales. She exhibits no tenderness.   Diminished breath sound bilateral, clear.   Abdominal: Soft. Bowel sounds are normal. She exhibits no distension. There is no tenderness. There is no rebound and no guarding.   Obesity.   Musculoskeletal: Normal range of motion. She exhibits no edema, tenderness or deformity.   Neurological: She is alert and oriented to person, place, and time. She displays normal reflexes. No cranial nerve deficit. She exhibits abnormal muscle tone. Coordination abnormal.   Skin: Skin is warm and dry. Capillary refill takes 2 to 3 seconds. No rash noted.   Psychiatric: She has a normal mood and affect. Her behavior is normal. Thought content normal.   Nursing note and vitals reviewed.      Results Review:  Lab Results (last 24 hours)     Procedure Component Value Units Date/Time    Comprehensive Metabolic Panel [[id number]]  (Abnormal) Collected:  05/01/20 0446    Specimen:  Blood Updated:  05/01/20 0550     Glucose 138 mg/dL      BUN 28 mg/dL      Creatinine 5.37 mg/dL      Sodium 125 mmol/L      Potassium 5.2 mmol/L      Chloride 86 mmol/L      CO2 23.0 mmol/L      Calcium 9.2 mg/dL      Total Protein 6.4 g/dL      Albumin 3.50 g/dL      ALT (SGPT) 25 U/L      AST (SGOT) 27 U/L      Alkaline Phosphatase 282 U/L      Total Bilirubin 0.3 mg/dL      eGFR Non African Amer 8 mL/min/1.73      Comment: <15 Indicative of kidney failure.        eGFR   Amer --     Comment: <15 Indicative of kidney failure.        Globulin 2.9 gm/dL      A/G Ratio 1.2 g/dL      BUN/Creatinine Ratio 5.2      Anion Gap 16.0 mmol/L     Narrative:       GFR Normal >60  Chronic Kidney Disease <60  Kidney Failure <15      CBC Auto Differential [[id number]]  (Abnormal) Collected:  05/01/20 0446    Specimen:  Blood Updated:  05/01/20 0532     WBC 5.92 10*3/mm3      RBC 3.30 10*6/mm3      Hemoglobin 9.0 g/dL      Hematocrit 29.7 %      MCV 90.0 fL      MCH 27.3 pg      MCHC 30.3 g/dL      RDW 21.2 %      RDW-SD 65.5 fl      MPV 8.9 fL      Platelets 306 10*3/mm3      Neutrophil % 75.4 %      Lymphocyte % 14.7 %      Monocyte % 4.4 %      Eosinophil % 4.7 %      Basophil % 0.3 %      Immature Grans % 0.5 %      Neutrophils, Absolute 4.46 10*3/mm3      Lymphocytes, Absolute 0.87 10*3/mm3      Monocytes, Absolute 0.26 10*3/mm3      Eosinophils, Absolute 0.28 10*3/mm3      Basophils, Absolute 0.02 10*3/mm3      Immature Grans, Absolute 0.03 10*3/mm3      nRBC 0.5 /100 WBC     Urine Culture - Urine, Urine, Clean Catch [[id number]] Collected:  04/30/20 1935    Specimen:  Urine, Clean Catch Updated:  04/30/20 2206    Urinalysis With Microscopic If Indicated (No Culture) - Urine, Clean Catch [[id number]]  (Abnormal) Collected:  04/30/20 1935    Specimen:  Urine, Clean Catch Updated:  04/30/20 1952     Color, UA Yellow     Appearance, UA Clear     pH, UA 7.0     Specific Gravity, UA 1.016     Glucose,  mg/dL (1+)     Ketones, UA Negative     Bilirubin, UA Negative     Blood, UA Moderate (2+)     Protein, UA >=300 mg/dL (3+)     Leuk Esterase, UA Moderate (2+)     Nitrite, UA Negative     Urobilinogen, UA 0.2 E.U./dL    Urinalysis, Microscopic Only - Urine, Clean Catch [[id number]]  (Abnormal) Collected:  04/30/20 1935    Specimen:  Urine, Clean Catch Updated:  04/30/20 1952     RBC, UA 21-30 /HPF      WBC, UA 21-30 /HPF      Bacteria, UA 1+ /HPF      Squamous Epithelial Cells, UA 3-6 /HPF      Hyaline Casts, UA 0-2 /LPF      Methodology Automated Microscopy    Procalcitonin [[id number]]  (Abnormal) Collected:  04/30/20 1914     "Specimen:  Blood Updated:  04/30/20 1950     Procalcitonin 0.39 ng/mL     Narrative:       As a Marker for Sepsis (Non-Neonates):   1. <0.5 ng/mL represents a low risk of severe sepsis and/or septic shock.  1. >2 ng/mL represents a high risk of severe sepsis and/or septic shock.    As a Marker for Lower Respiratory Tract Infections that require antibiotic therapy:  PCT on Admission     Antibiotic Therapy             6-12 Hrs later  > 0.5                Strongly Recommended            >0.25 - <0.5         Recommended  0.1 - 0.25           Discouraged                   Remeasure/reassess PCT  <0.1                 Strongly Discouraged          Remeasure/reassess PCT      As 28 day mortality risk marker: \"Change in Procalcitonin Result\" (> 80 % or <=80 %) if Day 0 (or Day 1) and Day 4 values are available. Refer to http://www.[employer]-calculator[employer]/   Change in PCT <=80 %   A decrease of PCT levels below or equal to 80 % defines a positive change in PCT test result representing a higher risk for 28-day all-cause mortality of patients diagnosed with severe sepsis or septic shock.  Change in PCT > 80 %   A decrease of PCT levels of more than 80 % defines a negative change in PCT result representing a lower risk for 28-day all-cause mortality of patients diagnosed with severe sepsis or septic shock.                Results may be falsely decreased if patient taking Biotin.     Comprehensive Metabolic Panel [[id number]]  (Abnormal) Collected:  04/30/20 1914    Specimen:  Blood Updated:  04/30/20 1948     Glucose 185 mg/dL      BUN 28 mg/dL      Creatinine 5.02 mg/dL      Sodium 124 mmol/L      Potassium 5.3 mmol/L      Chloride 83 mmol/L      CO2 23.0 mmol/L      Calcium 9.5 mg/dL      Total Protein 6.8 g/dL      Albumin 3.80 g/dL      ALT (SGPT) 30 U/L      AST (SGOT) 43 U/L      Comment: Specimen hemolyzed.  Results may be affected.        Alkaline Phosphatase 324 U/L      Total Bilirubin 0.3 mg/dL      eGFR Non  " Amer 9 mL/min/1.73      Comment: <15 Indicative of kidney failure.        eGFR   Amer --     Comment: <15 Indicative of kidney failure.        Globulin 3.0 gm/dL      A/G Ratio 1.3 g/dL      BUN/Creatinine Ratio 5.6     Anion Gap 18.0 mmol/L     Narrative:       GFR Normal >60  Chronic Kidney Disease <60  Kidney Failure <15      Lactic Acid, Plasma [[id number]]  (Normal) Collected:  04/30/20 1914    Specimen:  Blood Updated:  04/30/20 1941     Lactate 1.0 mmol/L     Magnesium [[id number]]  (Normal) Collected:  04/30/20 1914    Specimen:  Blood Updated:  04/30/20 1938     Magnesium 2.4 mg/dL     Blood Culture - Blood, Arm, Right [[id number]] Collected:  04/30/20 1914    Specimen:  Blood from Arm, Right Updated:  04/30/20 1934    Blood Culture - Blood, Arm, Right [[id number]] Collected:  04/30/20 1915    Specimen:  Blood from Arm, Right Updated:  04/30/20 1934    CBC & Differential [[id number]] Collected:  04/30/20 1914    Specimen:  Blood Updated:  04/30/20 1924    Narrative:       The following orders were created for panel order CBC & Differential.  Procedure                               Abnormality         Status                     ---------                               -----------         ------                     CBC Auto Differential[[id number]]        Abnormal            Final result                 Please view results for these tests on the individual orders.    CBC Auto Differential [[id number]]  (Abnormal) Collected:  04/30/20 1914    Specimen:  Blood Updated:  04/30/20 1924     WBC 7.64 10*3/mm3      RBC 3.46 10*6/mm3      Hemoglobin 9.4 g/dL      Hematocrit 31.1 %      MCV 89.9 fL      MCH 27.2 pg      MCHC 30.2 g/dL      RDW 21.3 %      RDW-SD 65.5 fl      MPV 8.9 fL      Platelets 318 10*3/mm3      Neutrophil % 81.1 %      Lymphocyte % 11.3 %      Monocyte % 3.9 %      Eosinophil % 2.7 %      Basophil % 0.3 %      Immature Grans % 0.7 %      Neutrophils, Absolute 6.20 10*3/mm3      Lymphocytes,  Absolute 0.86 10*3/mm3      Monocytes, Absolute 0.30 10*3/mm3      Eosinophils, Absolute 0.21 10*3/mm3      Basophils, Absolute 0.02 10*3/mm3      Immature Grans, Absolute 0.05 10*3/mm3      nRBC 0.4 /100 WBC            Cultures:  No results found for: BLOODCX, URINECX, WOUNDCX, MRSACX, RESPCX, STOOLCX    Radiology Data:    Imaging Results (Last 24 Hours)     Procedure Component Value Units Date/Time    XR Chest 1 View [[id number]] Collected:  04/30/20 1943     Updated:  04/30/20 1948    Narrative:       XR CHEST 1 VW- 4/30/2020 7:06 PM CDT     HISTORY: wheezing       COMPARISON: Chest exam dated 04/27/2020.     FINDINGS:      There is a dense retrocardiac consolidation with partially obscured left  hemidiaphragm. Notable blunting of the left costophrenic angle.  Additional small to moderate sized layering right pleural effusion.  Upper and midlung fields are clear. Mild cardiomegaly which is stable.  The pulmonary vasculature are nondilated. No pneumothorax. Reidentified  vascular stent projecting over the upper chest. No acute bony  abnormality.       Impression:       1. Dense retrocardiac consolidation reflects either atelectasis or  pneumonia in the left lower lobe. There are bilateral layering pleural  effusions (right greater than left).  2. Mild cardiomegaly which is stable.        This report was finalized on 04/30/2020 19:45 by Dr Jhonatan Berry, .          Allergies   Allergen Reactions   • Bupropion Nausea Only   • Chantix [Varenicline]    • Sulfa Antibiotics    • Azithromycin Rash   • Levofloxacin Rash   • Penicillins Rash       Scheduled meds:     aspirin 81 mg Oral Daily   brimonidine 1 drop Left Eye TID   carvedilol 12.5 mg Oral BID With Meals   dorzolamide 1 drop Left Eye BID   famotidine 20 mg Oral BID   fluticasone 2 spray Nasal BID   insulin lispro 2-7 Units Subcutaneous TID AC   lactobacillus acidophilus 1 capsule Oral Daily   levothyroxine 25 mcg Oral Daily   losartan 50 mg Oral BID   nicotine 1  patch Transdermal Q24H   piperacillin-tazobactam 3.375 g Intravenous Q12H   pravastatin 10 mg Oral Nightly   sertraline 25 mg Oral Daily   sevelamer 2,400 mg Oral TID With Meals   sodium chloride 10 mL Intravenous Q12H   timolol 1 drop Left Eye QAM       PRN meds:  •  acetaminophen **OR** acetaminophen **OR** acetaminophen  •  albumin human  •  dextrose  •  dextrose  •  glucagon (human recombinant)  •  ondansetron **OR** ondansetron  •  Pharmacy to dose vancomycin  •  Pharmacy to Dose Zosyn  •  sodium chloride  •  [COMPLETED] Insert peripheral IV **AND** sodium chloride  •  sodium chloride    Assessment/Plan       Pneumonia due to infectious organism    End stage renal failure on dialysis (CMS/[facility])    Glaucoma    Type 2 diabetes mellitus, with long-term current use of insulin (CMS/[facility])    Non-compliance with renal dialysis (CMS/[facility])    Tobacco abuse    Abnormal urine findings      Plan:  Possible pneumonia/COPD.  Patient denies any fever.  Continue vancomycin.  Change Zosyn to cefepime.  Continue supplemental oxygen.  Incentive spirometer.  Pulmonary toilet.  RT protocol.  X-ray at this admission is similar to previous chest x-ray from previous admission admission.  Chest x-ray-Dense retrocardiac consolidation reflects either atelectasis or  pneumonia in the left lower lobe, bilateral layering pleural effusions (right greater than left), mild cardiomegaly which is stable.    UTI.  Antibiotics as stated above.    Nausea/vomiting/diarrhea.  Patient planing the same thing last admission.  Patient did not have any diarrhea symptoms during the course of last hospital stay.  Patient also was tolerating diet well at last hospital stay.  No sign of diarrhea overnight per nursing staff.    Chronic kidney failure on dialysis.  History noncompliance.  Patient used to get dialysis Tuesday/Thursday/Saturday.  Patient missed dialysis yesterday.  Consult nephrology for dialysis.  Continue Pepcid.  Continue Zofran  PRN.    Hyperkalemia.  Resolved.    Hyponatremia.  Stable.  Per nephrology.    Anemia.  Anemia chronic disease.  Procrit.  Anemia panel.    Diabetes.  Insulin dependent.  Sliding scale.    Hypertension/hyperlipidemia.  Continue aspirin.  Continue Coreg.  Continue Cozaar.    Chronic smoker.  Patient smokes 1.5 pack of cigarette a day.  Nicotine patch.  Patient has no intention of quitting smoking.    Patient is legally blind.  Glaucoma.    Hypothyroidism.  Continue Synthroid.    DVT prophylaxis with SCD.    Nutrition.  Probiotic.  Regular-cardiac/consistent carb/renal diet.    Deconditioning.  PT and OT consult.    Blood cultures pending.  Urine cultures pending.    Discharge Planning: Fifth admission since 2020.  Patient refused rehab placement.  Consult  for possible evaluation of APS.    Lyndon Lockett MD   20   10:17                    Electronically signed by Lyndon Lockett MD at 20 1437       Medical Student Notes (most recent note)    No notes of this type exist for this encounter.         Physical Therapy Notes (most recent note)    No notes exist for this encounter.            Occupational Therapy Notes (most recent note)      Alf Chiu, OTR/L, CNT at 20 1456          Acute Care - Occupational Therapy Initial Eval/Discharge  [facility]     Patient Name: Mini Gentile  : 1960  MRN: [id number]  Today's Date: 2020  Onset of Illness/Injury or Date of Surgery: 20  Date of Referral to OT: 20  Referring Physician: Dr. Lockett      Admit Date: 2020       ICD-10-CM ICD-9-CM   1. Urinary tract infection with hematuria, site unspecified N39.0 599.0    R31.9 599.70   2. Hyponatremia E87.1 276.1   3. Hyperkalemia E87.5 276.7   4. Stage 5 chronic kidney disease on chronic dialysis (CMS/[facility]) N18.6 585.6    Z99.2 V45.11   5. 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   • Eustachian tube dysfunction   • Chronic otitis media   • Pneumonia of left upper lobe due to Streptococcus (CMS/[facility])   • End stage renal failure on dialysis (CMS/[facility])   • Diabetes (CMS/[facility])   • Glaucoma   • HTN (hypertension)   • Proteinuria   • Type 2 diabetes mellitus, with long-term current use of insulin (CMS/[facility])   • Anemia due to chronic kidney disease, on chronic dialysis (CMS/[facility])   • Acute pulmonary edema (CMS/[facility])   • Non-compliance with renal dialysis (CMS/[facility])   • Elevated troponin due to ESRD    • Hyperkalemia   • Encephalopathy, metabolic, due to uremia   • High anion gap metabolic acidosis due to uremia   • Respiratory distress   • Acute diastolic heart failure (CMS/[facility])   • Lymph node enlargement, left axillary measuring 1.1 cm -follow-up for primary care   • Nausea vomiting and diarrhea   • Acute diastolic heart failure (CMS/[facility])   • Diabetes mellitus with ophthalmic complication (CMS/[facility])   • Tobacco abuse   • Urinary tract infection with hematuria   • Pneumonia due to infectious organism   • Abnormal urine findings     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    • Chronic otitis media    • Diabetes (CMS/[facility])    • End stage renal disease on dialysis (CMS/[facility])    • Eustachian tube dysfunction    • Glaucoma    • HTN (hypertension)    • Mucoid otitis media    • Noncompliance of patient with renal dialysis (CMS/[facility])    • Tobacco abuse      Past Surgical History:   Procedure Laterality Date   • ARTERIOVENOUS FISTULA     • CATARACT EXTRACTION WITH INTRAOCULAR LENS IMPLANT     •  SECTION     • CHOLECYSTECTOMY     • MYRINGOTOMY W/ TUBES Bilateral    • MYRINGOTOMY W/ TUBES Right    • TUBAL ABDOMINAL LIGATION            OT ASSESSMENT FLOWSHEET (last 12 hours)      Occupational Therapy Evaluation     Row Name 20 0034                   OT Evaluation Time/Intention    Subjective Information  no complaints  -AC         Document Type  evaluation  -        Mode of Treatment  occupational therapy  -AC        Patient Effort  good  -AC           General Information    Patient Profile Reviewed?  yes  -AC        Onset of Illness/Injury or Date of Surgery  04/30/20  -        Referring Physician  Dr. Lockett  -        Patient Observations  alert;cooperative;agree to therapy  -        General Observations of Patient  lying in fowlers in bed, no apparent distress  -AC        Prior Level of Function  independent:;all household mobility;gait;transfer;bed mobility;ADL's;home management;cooking;cleaning  -AC        Equipment Currently Used at Home  walker, rolling  -        Pertinent History of Current Functional Problem  repeatedly missed HD tx d/t diarrhea, n/v, not feeling well, rehab consult to assess pt's ability to care for self at home; Dx: pneumonia, non compliance with HD, ESRD  -AC        Existing Precautions/Restrictions  fall  -AC        Risks Reviewed  patient:;LOB;nausea/vomiting;dizziness;increased discomfort;change in vital signs  -AC        Benefits Reviewed  patient:;improve function;increase independence;increase strength;increase balance;decrease pain;decrease risk of DVT;improve skin integrity;increase knowledge  -        Barriers to Rehab  none identified  -           Relationship/Environment    Lives With  significant other  -           Resource/Environmental Concerns    Current Living Arrangements  home/apartment/condo  -           Home Main Entrance    Number of Stairs, Main Entrance  four  -AC        Stair Railings, Main Entrance  railings on both sides of stairs  -           Cognitive Assessment/Interventions    Additional Documentation  Cognitive Assessment/Intervention (Group)  -           Cognitive Assessment/Intervention- PT/OT    Orientation Status (Cognition)  oriented x 4  -AC        Follows Commands (Cognition)  WNL  -        Personal Safety Interventions  fall prevention program  maintained;gait belt;muscle strengthening facilitated;nonskid shoes/slippers when out of bed;supervised activity  -           Safety Issues, Functional Mobility    Impairments Affecting Function (Mobility)  endurance/activity tolerance  -           Bed Mobility Assessment/Treatment    Bed Mobility Assessment/Treatment  scooting/bridging;supine-sit;sit-supine  -        Scooting/Bridging [place] (Bed Mobility)  independent  -        Supine-Sit [place] (Bed Mobility)  independent  -        Sit-Supine [place] (Bed Mobility)  independent  -           Functional Mobility    Functional Mobility- Ind. Level  contact guard assist;standby assist  -        Functional Mobility- Comment  in hallway, back to bed  -           Transfer Assessment/Treatment    Transfer Assessment/Treatment  sit-stand transfer;stand-sit transfer  -           Sit-Stand Transfer    Sit-Stand [place] (Transfers)  independent  -           Stand-Sit Transfer    Stand-Sit [place] (Transfers)  independent  -           ADL Assessment/Intervention    BADL Assessment/Intervention  lower body dressing  -           Lower Body Dressing Assessment/Training    Lower Body Dressing [place] Level  don;doff;socks  -        Lower Body Dressing Position  edge of bed sitting  -           BADL Safety/Performance    Impairments, BADL Safety/Performance  endurance/activity tolerance  -           General ROM    GENERAL ROM COMMENTS  WFL AROM BUE  -           MMT (Manual Muscle Testing)    General MMT Comments  4/5 BUE  -           Motor Assessment/Interventions    Additional Documentation  Balance (Group)  -           Balance    Balance  static sitting balance;static standing balance;dynamic sitting balance;dynamic standing balance  -           Static Sitting Balance    Level of [place] (Unsupported Sitting, Static Balance)  independent  -           Dynamic Sitting Balance    Level of [place],  Reaches Outside Midline (Sitting, Dynamic Balance)  independent  -AC           Static Standing Balance    Level of [place] (Supported Standing, Static Balance)  standby assist  -AC           Sensory Assessment/Intervention    Sensory General Assessment  no sensation deficits identified  -AC           Positioning and Restraints    Pre-Treatment Position  in bed  -AC        Post Treatment Position  bed  -AC        In Bed  fowlers;call light within reach;encouraged to call for assist;side rails up x2  -AC           Pain Assessment    Additional Documentation  Pain Scale: Numbers Pre/Post-Treatment (Group)  -AC           Pain Scale: Numbers Pre/Post-Treatment    Pain Scale: Numbers, Pretreatment  0/10 - no pain  -AC        Pain Scale: Numbers, Post-Treatment  0/10 - no pain  -AC           Clinical Impression (OT)    Date of Referral to OT  05/01/20  -AC        OT Diagnosis  decreased adl  -AC        Prognosis (OT Eval)  good  -AC        Criteria for Skilled Therapeutic Interventions Met (OT Eval)  no problems identified which require skilled intervention  -        Therapy Frequency (OT Eval)  evaluation only  -        Care Plan Review (OT)  evaluation/treatment results reviewed;care plan/treatment goals reviewed;risks/benefits reviewed;current/potential barriers reviewed;patient/other agree to care plan  -        Anticipated Discharge Disposition (OT)  home with home health  -           Living Environment    Home Accessibility  stairs to enter home;tub/shower is not walk in  -          User Key  (r) = Recorded By, (t) = Taken By, (c) = Cosigned By    Initials Name Effective Dates    AC Alf Chiu, OTR/L, CNT 04/09/19 -               OT Recommendation and Plan  Outcome Summary/Treatment Plan (OT)  Anticipated Discharge Disposition (OT): home with home health, home with assist  Therapy Frequency (OT Eval): evaluation only  Plan of Care Review  Plan of Care Reviewed With: patient  Plan of Care Reviewed  With: patient  Outcome Summary: OT eval completed.  Pt is alert and oriented x4.  Very pleasant.  Reports she has missed dialysis d/t diarrhea/GI sickness but understands the importance of going to all HD appts.  She was independent with all bed mobility, independent/SBA with transfers and SBA/CGA to ambulate in hallway.  She independently donned/doffed socks.  Strength in BUE is 4/5.  She has slightly decreased endurance but would be able to complete all ADL in home setting at her current level of function.  She reports she is going to purchase a tub bench which OT also recommended.  Pt appears safe for discharge home with HH.  Discussed benefit of HH with pt and she was agreeable and has had HH in the past.  OT will sign off at this time.         Outcome Measures     Row Name 05/01/20 1405             How much help from another is currently needed...    Putting on and taking off regular lower body clothing?  4  -AC      Bathing (including washing, rinsing, and drying)  4  -AC      Toileting (which includes using toilet bed pan or urinal)  4  -AC      Putting on and taking off regular upper body clothing  4  -AC      Taking care of personal grooming (such as brushing teeth)  4  -AC      Eating meals  4  -AC      AM-PAC 6 Clicks Score (OT)  24  -AC         Functional Assessment    Outcome Measure Options  AM-PAC 6 Clicks Daily Activity (OT)  -AC        User Key  (r) = Recorded By, (t) = Taken By, (c) = Cosigned By    Initials Name Provider Type    Alf Guan OTR/L, BETH Occupational Therapist          Time Calculation:   Time Calculation- OT     Row Name 05/01/20 1455             Time Calculation- OT    OT Start Time  1400  -AC      OT Stop Time  1456  -AC      OT Time Calculation (min)  56 min  -AC      OT Received On  05/01/20  -        User Key  (r) = Recorded By, (t) = Taken By, (c) = Cosigned By    Initials Name Provider Type    Alf Guan OTR/L, BETH Occupational Therapist        Therapy  Suggested Charges     Code   Minutes Charges    None           Therapy Charges for Today     Code Description Service Date Service Provider Modifiers Qty    39064157913 HC OT EVAL LOW COMPLEXITY 4 5/1/2020 Alf Chiu OTR/L, CNT GO 1               OT Discharge Summary  Anticipated Discharge Disposition (OT): home with home health, home with assist  Reason for Discharge: At baseline function  Outcomes Achieved: Other  Discharge Destination: Home with assist, Home with home health    Alf N. Kendall, OTR/L, CNT  5/1/2020    Electronically signed by Alf Chiu OTR/L, CNT at 05/01/20 1456          Discharge Summary      Lyndon Lockett MD at 05/02/20 0945              [facility]  DISCHARGE SUMMARY       Date of Admission: 4/30/2020  Date of Discharge:  5/2/2020  Primary Care Physician: Bharati Dahl APRN    Presenting Problem/History of Present Illness:  Urinary tract infection with hematuria, site unspecified [N39.0, R31.9]     Final Discharge Diagnoses:  Active Hospital Problems    Diagnosis   • **Pneumonia due to infectious organism   • Abnormal urine findings   • Tobacco abuse   • Non-compliance with renal dialysis (CMS/[facility])   • Type 2 diabetes mellitus, with long-term current use of insulin (CMS/[facility])   • End stage renal failure on dialysis (CMS/[facility])   • Glaucoma       Consults: Nephrology.    Procedures Performed: Dialysis.    Pertinent Test Results:   IMPRESSION: Chest x-ray.  1. Dense retrocardiac consolidation reflects either atelectasis or  pneumonia in the left lower lobe. There are bilateral layering pleural  effusions (right greater than left).  2. Mild cardiomegaly which is stable.    Chief Complaint on Day of Discharge: none    History of Present Illness on Day of Discharge:   Mini Gentile is a 59-year-old female with a past medical history of end-stage kidney disease on hemodialysis -multiple documentation notes regarding noncompliance  with dialysis; insulin-dependent diabetes, glaucoma left, hypertension, hypothyroidism, near blindness, tobacco abuse.  Patient has orders for hemodialysis Tuesday/Thursday/Saturday.  This is patient's fifth admission since 1/19/2020, having again missed dialysis today due to nausea/vomiting/abdominal pain/diarrhea patient states pain has resolved.  Patient was discharged from this facility yesterday.  ER work-up reveals abnormal chemistry, urinalysis was obtained which is also abnormal however will await urine culture results before continuing antibiotic therapy.  Chest x-ray reveals left lower lobe consolidation with concerns for healthcare associated pneumonia.  Patient denies fever, worsening shortness of breath (chronic), fever, patient still smokes 1.5 packs of cigarettes per day. Patient reports she is just too sick to go to dialysis.  I did discuss possible need for nursing home placement as due to chronic illnesses and inability to care for self this may be her best option to improve.  Currently she is stating this will not happen however I feel Adult Protective Services may need to be consulted for assessment at this time.  Patient is admitted as observation for further evaluation treatment.    Hospital Course:  The patient is a 60 y.o. female who presented to [facility] with COPD chronic/possible pneumonia/UTI/nausea vomiting diarrhea/chronic renal failure on dialysis/hyponatremia hyperkalemia.      Possible pneumonia/COPD.  Patient denies any fever.  Continue vancomycin.  Change Zosyn to cefepime.  Continue supplemental oxygen.  Incentive spirometer.  Pulmonary toilet.  RT protocol.  X-ray at this admission is similar to previous chest x-ray from previous admission admission.  Chest x-ray-Dense retrocardiac consolidation reflects either atelectasis or  pneumonia in the left lower lobe, bilateral layering pleural effusions (right greater than left), mild cardiomegaly which is stable.     UTI.   "Antibiotics as stated above.     Nausea/vomiting/diarrhea.  Same symptom at last admission. Patient did not have any diarrhea symptoms during the course of last hospital stay.  Patient did not have any sign of nausea vomiting overnight neither.  Patient tolerated diet well.   Patient to continue Pepcid.     Patient to continue Zofran PRN.     Chronic kidney failure on dialysis.  History noncompliance.  Patient used to get dialysis Tuesday/Thursday/Saturday.  Patient missed dialysis yesterday.  Consult nephrology for dialysis.         Hyperkalemia.  Resolved.     Hyponatremia.  Improving.     Anemia.  Anemia chronic disease.  Procrit.       Diabetes.  Insulin dependent.  Sliding scale.  Patient go back on insulin at home.     Hypertension/hyperlipidemia.  Continue aspirin.  Continue Coreg.  Continue Cozaar.     Chronic smoker.  Patient smokes 1.5 pack of cigarette a day.  Nicotine patch.  Patient states she has nicotine patch at home.     Patient is legally blind.  Glaucoma.     Hypothyroidism.     Patient to continue Synthroid.     DVT prophylaxis with SCD.     Nutrition.  Probiotic.  Regular-cardiac/consistent carb/renal diet.     Deconditioning.  PT and OT consult.     Blood cultures-no growth in 24 hours. Urine cultures-no growth.      Fifth admission since 1/19/2020.  Patient refused rehab placement.     Discussed  for evaluation of APS.  Vital signs stable, afebrile.  Plan to discharge patient after dialysis today.  Follow-up with PCP 1 week.  Follow-up with dialysis center as scheduled date Tuesday Thursday Saturday.    Condition on Discharge: Stable.    Physical Exam on Discharge:  /64 (BP Location: Right arm, Patient Position: Lying)   Pulse 78   Temp 98.4 °F (36.9 °C) (Oral)   Resp 18   Ht 147.3 cm (58\")   Wt 64.2 kg (141 lb 8 oz)   SpO2 96%   BMI 29.57 kg/m²    Physical Exam   Constitutional: She is oriented to person, place, and time. She appears well-developed.   HENT:   Head: " Normocephalic.   Eyes: Pupils are equal, round, and reactive to light. Conjunctivae are normal.   Neck: Neck supple. No JVD present.   Cardiovascular: Normal rate, regular rhythm, normal heart sounds and intact distal pulses. Exam reveals no gallop and no friction rub.   No murmur heard.  Pulmonary/Chest: Effort normal and breath sounds normal. No respiratory distress. She has no wheezes. She has no rales. She exhibits no tenderness.   Abdominal: Soft. Bowel sounds are normal. She exhibits no distension. There is no tenderness. There is no rebound and no guarding.   Obesity.   Musculoskeletal: Normal range of motion. She exhibits no edema, tenderness or deformity.   Neurological: She is alert and oriented to person, place, and time. She displays normal reflexes. No cranial nerve deficit. She exhibits normal muscle tone.   Skin: Skin is warm and dry. Capillary refill takes 2 to 3 seconds. No rash noted.   Psychiatric: She has a normal mood and affect. Her behavior is normal. Thought content normal.   Nursing note and vitals reviewed.        Discharge Disposition: Discharged home with family.      Discharge Medications:     Discharge Medications      New Medications      Instructions Start Date   cefdinir 300 MG capsule  Commonly known as:  OMNICEF   300 mg, Oral, 2 Times Daily      lactobacillus acidophilus capsule capsule   1 capsule, Oral, Daily   Start Date:  May 3, 2020        Changes to Medications      Instructions Start Date   carvedilol 12.5 MG tablet  Commonly known as:  COREG  What changed:  Another medication with the same name was removed. Continue taking this medication, and follow the directions you see here.   12.5 mg, Oral, 2 Times Daily With Meals         Continue These Medications      Instructions Start Date   aspirin 81 MG EC tablet   81 mg, Oral, Daily      brimonidine 0.2 % ophthalmic solution  Commonly known as:  ALPHAGAN   1 drop, Left Eye, 3 Times Daily      cholecalciferol 25 MCG (1000 UT)  tablet  Commonly known as:  VITAMIN D3   2,000 Units, Oral, Daily      dorzolamide 2 % ophthalmic solution  Commonly known as:  TRUSOPT   1 drop, Left Eye, 2 Times Daily      famotidine 20 MG tablet  Commonly known as:  PEPCID   20 mg, Oral, 2 Times Daily      fluticasone 50 MCG/ACT nasal spray  Commonly known as:  FLONASE   2 sprays, Nasal, 2 Times Daily      insulin glargine 100 UNIT/ML injection  Commonly known as:  LANTUS   10 Units, Subcutaneous, Nightly      levothyroxine 25 MCG tablet  Commonly known as:  SYNTHROID, LEVOTHROID   25 mcg, Oral, Daily      losartan 50 MG tablet  Commonly known as:  COZAAR   50 mg, Oral, 2 Times Daily      nicotine 21 MG/24HR patch  Commonly known as:  NICODERM CQ   1 patch, Transdermal, Every 24 Hours      ondansetron 4 MG tablet  Commonly known as:  ZOFRAN   4 mg, Oral, Every 6 Hours PRN      pravastatin 10 MG tablet  Commonly known as:  PRAVACHOL   10 mg, Oral, Nightly      Rhopressa 0.02 % solution  Generic drug:  Netarsudil Dimesylate   1 drop, Left Eye, Nightly      sertraline 25 MG tablet  Commonly known as:  ZOLOFT   25 mg, Oral, Daily      sevelamer 800 MG tablet  Commonly known as:  RENAGEL   2,400 mg, Oral, 3 Times Daily With Meals      timolol 0.5 % ophthalmic solution  Commonly known as:  TIMOPTIC   1 drop, Left Eye, Every Morning      Ventolin  (90 Base) MCG/ACT inhaler  Generic drug:  albuterol sulfate HFA   2 puffs, Inhalation, Every 4 Hours PRN      Vyzulta 0.024 % solution  Generic drug:  Latanoprostene Bunod   1 drop, Left Eye, Nightly, Left eye         Stop These Medications    bismuth subsalicylate 262 MG chewable tablet  Commonly known as:  PEPTO BISMOL            Discharge Diet:   Diet Instructions     Advance Diet As Tolerated            Activity at Discharge:   Activity Instructions     Activity as Tolerated            Discharge Care Plan/Instructions: Patient refused rehab placement.    Follow-up Appointments:   Follow-up with PCP 1 week.   Follow-up with nephrology scheduled dialysis appointment.      Lyndon Lockett MD  05/02/20  09:59    Time: Greater than 30 minutes        Electronically signed by Lyndon Lockett MD at 05/02/20 0959

## 2020-05-02 NOTE — PLAN OF CARE
Problem: Patient Care Overview  Goal: Plan of Care Review  5/2/2020 3905 by Xena Grijalva RN  Outcome: Ongoing (interventions implemented as appropriate)  Flowsheets (Taken 5/2/2020 3822)  Outcome Summary: monitoring labs. pt states feels much better after dialysis session yesterday. NSR 62-79 on tele. no falls. received one time breathing treatment ..states breathing better afterward.

## 2020-05-02 NOTE — DISCHARGE SUMMARY
[facility] Medicine Services  DISCHARGE SUMMARY       Date of Admission: 4/30/2020  Date of Discharge:  5/2/2020  Primary Care Physician: Bharati Dahl APRN    Presenting Problem/History of Present Illness:  Urinary tract infection with hematuria, site unspecified [N39.0, R31.9]     Final Discharge Diagnoses:  Active Hospital Problems    Diagnosis   • **Pneumonia due to infectious organism   • Abnormal urine findings   • Tobacco abuse   • Non-compliance with renal dialysis (CMS/[facility])   • Type 2 diabetes mellitus, with long-term current use of insulin (CMS/[facility])   • End stage renal failure on dialysis (CMS/[facility])   • Glaucoma       Consults: Nephrology.    Procedures Performed: Dialysis.    Pertinent Test Results:   IMPRESSION: Chest x-ray.  1. Dense retrocardiac consolidation reflects either atelectasis or  pneumonia in the left lower lobe. There are bilateral layering pleural  effusions (right greater than left).  2. Mild cardiomegaly which is stable.    Chief Complaint on Day of Discharge: none    History of Present Illness on Day of Discharge:   Mini Gentile is a 59-year-old female with a past medical history of end-stage kidney disease on hemodialysis -multiple documentation notes regarding noncompliance with dialysis; insulin-dependent diabetes, glaucoma left, hypertension, hypothyroidism, near blindness, tobacco abuse.  Patient has orders for hemodialysis Tuesday/Thursday/Saturday.  This is patient's fifth admission since 1/19/2020, having again missed dialysis today due to nausea/vomiting/abdominal pain/diarrhea patient states pain has resolved.  Patient was discharged from this facility yesterday.  ER work-up reveals abnormal chemistry, urinalysis was obtained which is also abnormal however will await urine culture results before continuing antibiotic therapy.  Chest x-ray reveals left lower lobe consolidation with concerns for healthcare associated pneumonia.  Patient  denies fever, worsening shortness of breath (chronic), fever, patient still smokes 1.5 packs of cigarettes per day. Patient reports she is just too sick to go to dialysis.  I did discuss possible need for nursing home placement as due to chronic illnesses and inability to care for self this may be her best option to improve.  Currently she is stating this will not happen however I feel Adult Protective Services may need to be consulted for assessment at this time.  Patient is admitted as observation for further evaluation treatment.    Hospital Course:  The patient is a 60 y.o. female who presented to [facility] with COPD chronic/possible pneumonia/UTI/nausea vomiting diarrhea/chronic renal failure on dialysis/hyponatremia hyperkalemia.      Possible pneumonia/COPD.  Patient denies any fever.  Continue vancomycin.  Change Zosyn to cefepime.  Continue supplemental oxygen.  Incentive spirometer.  Pulmonary toilet.  RT protocol.  X-ray at this admission is similar to previous chest x-ray from previous admission admission.  Chest x-ray-Dense retrocardiac consolidation reflects either atelectasis or  pneumonia in the left lower lobe, bilateral layering pleural effusions (right greater than left), mild cardiomegaly which is stable.     UTI.  Antibiotics as stated above.     Nausea/vomiting/diarrhea.  Same symptom at last admission. Patient did not have any diarrhea symptoms during the course of last hospital stay.  Patient did not have any sign of nausea vomiting overnight neither.  Patient tolerated diet well.   Patient to continue Pepcid.    Patient to continue Zofran PRN.     Chronic kidney failure on dialysis.  History noncompliance.  Patient used to get dialysis Tuesday/Thursday/Saturday.  Patient missed dialysis yesterday.  Consult nephrology for dialysis.         Hyperkalemia.  Resolved.     Hyponatremia.  Improving.     Anemia.  Anemia chronic disease.  Procrit.       Diabetes.  Insulin dependent.   "Sliding scale.  Patient go back on insulin at home.     Hypertension/hyperlipidemia.  Continue aspirin.  Continue Coreg.  Continue Cozaar.     Chronic smoker.  Patient smokes 1.5 pack of cigarette a day.  Nicotine patch.  Patient states she has nicotine patch at home.     Patient is legally blind.  Glaucoma.     Hypothyroidism.    Patient to continue Synthroid.     DVT prophylaxis with SCD.     Nutrition.  Probiotic.  Regular-cardiac/consistent carb/renal diet.     Deconditioning.  PT and OT consult.     Blood cultures-no growth in 24 hours. Urine cultures-no growth.      Fifth admission since 1/19/2020.  Patient refused rehab placement.    Discussed  for evaluation of APS.  Vital signs stable, afebrile.  Plan to discharge patient after dialysis today.  Follow-up with PCP 1 week.  Follow-up with dialysis center as scheduled date Tuesday Thursday Saturday.    Condition on Discharge: Stable.    Physical Exam on Discharge:  /64 (BP Location: Right arm, Patient Position: Lying)   Pulse 78   Temp 98.4 °F (36.9 °C) (Oral)   Resp 18   Ht 147.3 cm (58\")   Wt 64.2 kg (141 lb 8 oz)   SpO2 96%   BMI 29.57 kg/m²   Physical Exam   Constitutional: She is oriented to person, place, and time. She appears well-developed.   HENT:   Head: Normocephalic.   Eyes: Pupils are equal, round, and reactive to light. Conjunctivae are normal.   Neck: Neck supple. No JVD present.   Cardiovascular: Normal rate, regular rhythm, normal heart sounds and intact distal pulses. Exam reveals no gallop and no friction rub.   No murmur heard.  Pulmonary/Chest: Effort normal and breath sounds normal. No respiratory distress. She has no wheezes. She has no rales. She exhibits no tenderness.   Abdominal: Soft. Bowel sounds are normal. She exhibits no distension. There is no tenderness. There is no rebound and no guarding.   Obesity.   Musculoskeletal: Normal range of motion. She exhibits no edema, tenderness or deformity. "   Neurological: She is alert and oriented to person, place, and time. She displays normal reflexes. No cranial nerve deficit. She exhibits normal muscle tone.   Skin: Skin is warm and dry. Capillary refill takes 2 to 3 seconds. No rash noted.   Psychiatric: She has a normal mood and affect. Her behavior is normal. Thought content normal.   Nursing note and vitals reviewed.        Discharge Disposition: Discharged home with family.      Discharge Medications:     Discharge Medications      New Medications      Instructions Start Date   cefdinir 300 MG capsule  Commonly known as:  OMNICEF   300 mg, Oral, 2 Times Daily      lactobacillus acidophilus capsule capsule   1 capsule, Oral, Daily   Start Date:  May 3, 2020        Changes to Medications      Instructions Start Date   carvedilol 12.5 MG tablet  Commonly known as:  COREG  What changed:  Another medication with the same name was removed. Continue taking this medication, and follow the directions you see here.   12.5 mg, Oral, 2 Times Daily With Meals         Continue These Medications      Instructions Start Date   aspirin 81 MG EC tablet   81 mg, Oral, Daily      brimonidine 0.2 % ophthalmic solution  Commonly known as:  ALPHAGAN   1 drop, Left Eye, 3 Times Daily      cholecalciferol 25 MCG (1000 UT) tablet  Commonly known as:  VITAMIN D3   2,000 Units, Oral, Daily      dorzolamide 2 % ophthalmic solution  Commonly known as:  TRUSOPT   1 drop, Left Eye, 2 Times Daily      famotidine 20 MG tablet  Commonly known as:  PEPCID   20 mg, Oral, 2 Times Daily      fluticasone 50 MCG/ACT nasal spray  Commonly known as:  FLONASE   2 sprays, Nasal, 2 Times Daily      insulin glargine 100 UNIT/ML injection  Commonly known as:  LANTUS   10 Units, Subcutaneous, Nightly      levothyroxine 25 MCG tablet  Commonly known as:  SYNTHROID, LEVOTHROID   25 mcg, Oral, Daily      losartan 50 MG tablet  Commonly known as:  COZAAR   50 mg, Oral, 2 Times Daily      nicotine 21 MG/24HR  patch  Commonly known as:  NICODERM CQ   1 patch, Transdermal, Every 24 Hours      ondansetron 4 MG tablet  Commonly known as:  ZOFRAN   4 mg, Oral, Every 6 Hours PRN      pravastatin 10 MG tablet  Commonly known as:  PRAVACHOL   10 mg, Oral, Nightly      Rhopressa 0.02 % solution  Generic drug:  Netarsudil Dimesylate   1 drop, Left Eye, Nightly      sertraline 25 MG tablet  Commonly known as:  ZOLOFT   25 mg, Oral, Daily      sevelamer 800 MG tablet  Commonly known as:  RENAGEL   2,400 mg, Oral, 3 Times Daily With Meals      timolol 0.5 % ophthalmic solution  Commonly known as:  TIMOPTIC   1 drop, Left Eye, Every Morning      Ventolin  (90 Base) MCG/ACT inhaler  Generic drug:  albuterol sulfate HFA   2 puffs, Inhalation, Every 4 Hours PRN      Vyzulta 0.024 % solution  Generic drug:  Latanoprostene Bunod   1 drop, Left Eye, Nightly, Left eye         Stop These Medications    bismuth subsalicylate 262 MG chewable tablet  Commonly known as:  PEPTO BISMOL            Discharge Diet:   Diet Instructions     Advance Diet As Tolerated            Activity at Discharge:   Activity Instructions     Activity as Tolerated            Discharge Care Plan/Instructions: Patient refused rehab placement.    Follow-up Appointments:   Follow-up with PCP 1 week.  Follow-up with nephrology scheduled dialysis appointment.      Lyndon Lockett MD  05/02/20  09:59    Time: Greater than 30 minutes

## 2020-05-03 ENCOUNTER — READMISSION MANAGEMENT (OUTPATIENT)
Dept: CALL CENTER | Facility: HOSPITAL | Age: 60
End: 2020-05-03

## 2020-05-03 NOTE — OUTREACH NOTE
COPD/PN Week 1 Survey      Responses   [facility] patient discharged from?  [place]   COVID-19 Test Status  Negative   Does the patient have one of the following disease processes/diagnoses(primary or secondary)?  COPD/Pneumonia   Is there a successful TCM telephone encounter documented?  No   Was the primary reason for admission:  Pneumonia   Week 1 attempt successful?  Yes   Call start time  1249   Call end time  1259   General alerts for this patient  history of noncompliance with dialysis and several admissions for volume overload   Discharge diagnosis  Pneumonia,   UTI   Is patient permission given to speak with other caregiver?  Yes   Person spoke with today (if not patient) and relationship   and patient   Meds reviewed with patient/caregiver?  Yes   Is the patient having any side effects they believe may be caused by any medication additions or changes?  No   Does the patient have all medications ordered at discharge?  No   What is keeping the patient from filling the prescriptions?  Doesn't understand reason for taking   Nursing Interventions  Nurse provided patient education   Prescription comments   will go  Probiotic- he has already gotten antibiotic but wanted to ask what other med was for. He reports he will get today.    Is the patient taking all medications as directed (includes completed medication regime)?  Yes   Comments regarding appointments  Dialysis - Tuesday Thursday and Saturday   Does the patient have a primary care provider?   Yes   Does the patient have an appointment with their PCP or pulmonologist within 7 days of discharge?  No   Comments regarding PCP  PCP:  Bharati Dahl APRN- encouraged a visit with PCP within week   What is preventing the patient from scheduling follow up appointments within 7 days of discharge?  Haven't had time   Nursing Interventions  Advised patient to make appointment   What is the Home health agency?   [facility]   Has  home health visited the patient within 72 hours of discharge?  No   Home health comments  Has not heard from  yet, but she was discharged on weekend   Pulse Ox monitoring  None   Psychosocial issues?  No   Comments  Patient reports she is doing well. No SOB, fever or cough.    Did the patient receive a copy of their discharge instructions?  Yes   Nursing interventions  Reviewed instructions with patient   What is the patient's perception of their health status since discharge?  Improving   Nursing Interventions  Nurse provided patient education   Is the patient/caregiver able to teach back signs and symptoms of worsening condition:  Fever/chills, Shortness of breath, Chest pain   Is the patient/caregiver able to teach back importance of completing antibiotic course of treatment?  Yes   Week 1 call completed?  Yes          Frankie Guevara RN

## 2020-05-03 NOTE — OUTREACH NOTE
Prep Survey      Responses   [facility]?  [place]   Is LACE score < 7 ?  No   Eligibility  Readm Mgmt   Discharge diagnosis  Pneumonia,   UTI   COVID-19 Test Status  Negative   Does the patient have one of the following disease processes/diagnoses(primary or secondary)?  COPD/Pneumonia   Does the patient have Home health ordered?  Yes   [facility]   Is there a DME ordered?  No   Prep survey completed?  Yes          Perla Rogers RN

## 2020-05-04 ENCOUNTER — READMISSION MANAGEMENT (OUTPATIENT)
Dept: CALL CENTER | Facility: HOSPITAL | Age: 60
End: 2020-05-04

## 2020-05-04 ENCOUNTER — TELEPHONE (OUTPATIENT)
Dept: INTERNAL MEDICINE | Age: 60
End: 2020-05-04

## 2020-05-04 NOTE — TELEPHONE ENCOUNTER
[facility] Trans[facility] Initial Follow Up Call    Outreach made within 2 business days of discharge: Yes    Patient: [place] Patient : 1960   MRN: [id number]    Reason for Admission:    Presenting Problem/History of Present Illness:  Urinary tract infection with hematuria, site unspecified [N39.0, R31.9]     Final Discharge Diagnoses: Active Hospital Problems   Diagnosis    **Pneumonia due to infectious organism    Abnormal urine findings    Tobacco abuse    Non-compliance with renal dialysis (CMS/[facility])    Type 2 diabetes mellitus, with long-term current use of insulin (CMS/[facility])    End stage renal failure on dialysis (CMS/[facility])    Glaucoma        Spoke with: Patient currently in dialysis, spoke with spouse    Discharge department/facility: [place] [id number] Patient Contact:  Was patient able to fill all prescriptions: Yes  Was patient instructed to bring all medications to the follow-up visit: Yes  Is patient taking all medications as directed in the discharge summary? Yes  Does patient understand their discharge instructions: Yes  Does patient have questions or concerns that need addressed prior to 7-14 day follow up office visit: no    Per spouse patient was doing good Saturday and , but woke up not feeling great today. He said they weren't able to do a full dialysis treatment at the hospital for some reason and only pulled 1 L off of patient. He said thankfully dialysis was able to get her in today, and she will go back tomorrow for her regularly scheduled dialysis. He said he is trying his best to get her to her treatments, but \"shes's stubborn as a Waldo. \" He said she has a question for Kemi's office, but he doesn't know what it is. He will have her call the office. She will be able to keep her virtual visit Friday.     Scheduled appointment with PCP within 7-14 days    Follow Up  Future Appointments   Date Time Provider Darrell Price   2020 1:00 PM RAMONA Valente [facility]   5/15/2020  9:30 AM RAMONA Valente [facility]       April D ANA LILIA Mata

## 2020-05-04 NOTE — OUTREACH NOTE
COPD/PN Week 1 Survey      Responses   [facility] patient discharged from?  [place]   COVID-19 Test Status  Negative   Does the patient have one of the following disease processes/diagnoses(primary or secondary)?  COPD/Pneumonia   Is there a successful TCM telephone encounter documented?  No   Was the primary reason for admission:  Pneumonia   Week 1 attempt successful?  No          Delores Farias RN

## 2020-05-05 ENCOUNTER — READMISSION MANAGEMENT (OUTPATIENT)
Dept: CALL CENTER | Facility: HOSPITAL | Age: 60
End: 2020-05-05

## 2020-05-05 LAB
BACTERIA SPEC AEROBE CULT: NORMAL
BACTERIA SPEC AEROBE CULT: NORMAL

## 2020-05-05 NOTE — OUTREACH NOTE
COPD/PN Week 1 Survey      Responses   [facility] patient discharged from?  [place]   COVID-19 Test Status  Negative   Does the patient have one of the following disease processes/diagnoses(primary or secondary)?  COPD/Pneumonia   Is there a successful TCM telephone encounter documented?  No   Was the primary reason for admission:  Pneumonia   Week 1 attempt successful?  Yes   Call start time  1116   Call end time  1126   Discharge diagnosis  Pneumonia,   UTI   Is patient permission given to speak with other caregiver?  No   Meds reviewed with patient/caregiver?  Yes   Is the patient having any side effects they believe may be caused by any medication additions or changes?  No   Does the patient have all medications ordered at discharge?  Yes   Is the patient taking all medications as directed (includes completed medication regime)?  Yes   Does the patient have a primary care provider?   Yes   Does the patient have an appointment with their PCP or pulmonologist within 7 days of discharge?  Greater than 7 days   Comments regarding PCP  PCP:  Bharati Dahl APRN- Patient states that she has an appt, she states that she thinks it is Monday    Nursing Interventions  -- [Reinforced need to follow up with PCP post hospital stay. ]   Has the patient kept scheduled appointments due by today?  Yes   Comments  Patient reports keeping dialysis appts.    What is the Home health agency?   [facility]   Has home health visited the patient within 72 hours of discharge?  No   Home health interventions  Called Home Health agency   Home health comments  Patient scheduled to be seen tomorrow 5/6/20 per Delores at [facility]   Pulse Ox monitoring  None   Psychosocial issues?  No   Did the patient receive a copy of their discharge instructions?  Yes   Nursing interventions  Reviewed instructions with patient   What is the patient's perception of their health status since discharge?  Improving   Is the patient/caregiver able to  teach back the hierarchy of who to call/visit for symptoms/problems? PCP, Specialist, Home health nurse, Urgent Care, ED, 911  Yes   Additional teach back comments  Patient denies fever. Patient to mask for outings to dialysis. Discussed hand  use and good handwashing.    Is the patient/caregiver able to teach back signs and symptoms of worsening condition:  Fever/chills, Shortness of breath, Chest pain   Is the patient/caregiver able to teach back importance of completing antibiotic course of treatment?  Yes   Week 1 call completed?  Yes          Soledda Sandhu RN

## 2020-05-07 ENCOUNTER — HOSPITAL ENCOUNTER (INPATIENT)
Age: 60
LOS: 1 days | Discharge: HOME | DRG: 189 | End: 2020-05-09
Attending: EMERGENCY MEDICINE | Admitting: HOSPITALIST
Payer: MEDICARE

## 2020-05-07 ENCOUNTER — APPOINTMENT (OUTPATIENT)
Dept: GENERAL RADIOLOGY | Age: 60
DRG: 189 | End: 2020-05-07
Payer: MEDICARE

## 2020-05-07 PROBLEM — I50.1 PULMONARY EDEMA WITH CONGESTIVE HEART FAILURE (HCC): Status: ACTIVE | Noted: 2020-05-07

## 2020-05-07 LAB
ALBUMIN SERPL-MCNC: 4.2 G/DL (ref 3.5–5.2)
ALP BLD-CCNC: 231 U/L (ref 35–104)
ALT SERPL-CCNC: 21 U/L (ref 5–33)
ANION GAP SERPL CALCULATED.3IONS-SCNC: 14 MMOL/L (ref 7–19)
AST SERPL-CCNC: 26 U/L (ref 5–32)
BASE EXCESS ARTERIAL: 14.8 MMOL/L (ref -2–2)
BASOPHILS ABSOLUTE: 0 K/UL (ref 0–0.2)
BASOPHILS RELATIVE PERCENT: 0.3 % (ref 0–1)
BILIRUB SERPL-MCNC: 0.7 MG/DL (ref 0.2–1.2)
BUN BLDV-MCNC: 9 MG/DL (ref 8–23)
CALCIUM SERPL-MCNC: 9.5 MG/DL (ref 8.8–10.2)
CARBOXYHEMOGLOBIN ARTERIAL: 3.7 % (ref 0–5)
CHLORIDE BLD-SCNC: 89 MMOL/L (ref 98–111)
CO2: 33 MMOL/L (ref 22–29)
CREAT SERPL-MCNC: 2.1 MG/DL (ref 0.5–0.9)
EOSINOPHILS ABSOLUTE: 0.2 K/UL (ref 0–0.6)
EOSINOPHILS RELATIVE PERCENT: 2.3 % (ref 0–5)
GFR NON-AFRICAN AMERICAN: 24
GLUCOSE BLD-MCNC: 156 MG/DL (ref 74–109)
GLUCOSE BLD-MCNC: 227 MG/DL (ref 70–99)
HCO3 ARTERIAL: 38.5 MMOL/L (ref 22–26)
HCT VFR BLD CALC: 33.9 % (ref 37–47)
HEMOGLOBIN, ART, EXTENDED: 10.3 G/DL (ref 12–16)
HEMOGLOBIN: 10.3 G/DL (ref 12–16)
IMMATURE GRANULOCYTES #: 0 K/UL
LYMPHOCYTES ABSOLUTE: 0.8 K/UL (ref 1.1–4.5)
LYMPHOCYTES RELATIVE PERCENT: 12.7 % (ref 20–40)
MCH RBC QN AUTO: 28.3 PG (ref 27–31)
MCHC RBC AUTO-ENTMCNC: 30.4 G/DL (ref 33–37)
MCV RBC AUTO: 93.1 FL (ref 81–99)
METHEMOGLOBIN ARTERIAL: 0.7 %
MONOCYTES ABSOLUTE: 0.4 K/UL (ref 0–0.9)
MONOCYTES RELATIVE PERCENT: 6.6 % (ref 0–10)
NEUTROPHILS ABSOLUTE: 5 K/UL (ref 1.5–7.5)
NEUTROPHILS RELATIVE PERCENT: 77.6 % (ref 50–65)
O2 CONTENT ARTERIAL: 12.9 ML/DL
O2 SAT, ARTERIAL: 88.6 %
O2 THERAPY: ABNORMAL
PCO2 ARTERIAL: 43 MMHG (ref 35–45)
PDW BLD-RTO: 21.4 % (ref 11.5–14.5)
PERFORMED ON: ABNORMAL
PH ARTERIAL: 7.56 (ref 7.35–7.45)
PLATELET # BLD: 241 K/UL (ref 130–400)
PMV BLD AUTO: 8.1 FL (ref 9.4–12.3)
PO2 ARTERIAL: 53 MMHG (ref 80–100)
POTASSIUM SERPL-SCNC: 3.2 MMOL/L (ref 3.5–5)
POTASSIUM, WHOLE BLOOD: 3.2
PRO-BNP: ABNORMAL PG/ML (ref 0–900)
RBC # BLD: 3.64 M/UL (ref 4.2–5.4)
SODIUM BLD-SCNC: 136 MMOL/L (ref 136–145)
TOTAL PROTEIN: 7.3 G/DL (ref 6.6–8.7)
TROPONIN: 0.05 NG/ML (ref 0–0.03)
WBC # BLD: 6.4 K/UL (ref 4.8–10.8)

## 2020-05-07 PROCEDURE — 83880 ASSAY OF NATRIURETIC PEPTIDE: CPT

## 2020-05-07 PROCEDURE — 36600 WITHDRAWAL OF ARTERIAL BLOOD: CPT

## 2020-05-07 PROCEDURE — 84132 ASSAY OF SERUM POTASSIUM: CPT

## 2020-05-07 PROCEDURE — G0378 HOSPITAL OBSERVATION PER HR: HCPCS

## 2020-05-07 PROCEDURE — 80053 COMPREHEN METABOLIC PANEL: CPT

## 2020-05-07 PROCEDURE — 6370000000 HC RX 637 (ALT 250 FOR IP): Performed by: HOSPITALIST

## 2020-05-07 PROCEDURE — 85025 COMPLETE CBC W/AUTO DIFF WBC: CPT

## 2020-05-07 PROCEDURE — 84484 ASSAY OF TROPONIN QUANT: CPT

## 2020-05-07 PROCEDURE — 36415 COLL VENOUS BLD VENIPUNCTURE: CPT

## 2020-05-07 PROCEDURE — 99285 EMERGENCY DEPT VISIT HI MDM: CPT

## 2020-05-07 PROCEDURE — 82803 BLOOD GASES ANY COMBINATION: CPT

## 2020-05-07 PROCEDURE — 71045 X-RAY EXAM CHEST 1 VIEW: CPT

## 2020-05-07 PROCEDURE — 93005 ELECTROCARDIOGRAM TRACING: CPT | Performed by: EMERGENCY MEDICINE

## 2020-05-07 PROCEDURE — 2580000003 HC RX 258: Performed by: HOSPITALIST

## 2020-05-07 RX ORDER — IPRATROPIUM BROMIDE AND ALBUTEROL SULFATE 2.5; .5 MG/3ML; MG/3ML
1 SOLUTION RESPIRATORY (INHALATION) EVERY 4 HOURS
Status: DISCONTINUED | OUTPATIENT
Start: 2020-05-07 | End: 2020-05-09 | Stop reason: SDUPTHER

## 2020-05-07 RX ORDER — SEVELAMER CARBONATE 800 MG/1
800 TABLET, FILM COATED ORAL DAILY
Status: DISCONTINUED | OUTPATIENT
Start: 2020-05-08 | End: 2020-05-09 | Stop reason: HOSPADM

## 2020-05-07 RX ORDER — MIDODRINE HYDROCHLORIDE 2.5 MG/1
5 TABLET ORAL 2 TIMES DAILY WITH MEALS
Status: DISCONTINUED | OUTPATIENT
Start: 2020-05-08 | End: 2020-05-09 | Stop reason: HOSPADM

## 2020-05-07 RX ORDER — TRAVOPROST OPHTHALMIC SOLUTION 0.04 MG/ML
1 SOLUTION OPHTHALMIC NIGHTLY
Status: DISCONTINUED | OUTPATIENT
Start: 2020-05-07 | End: 2020-05-07 | Stop reason: CLARIF

## 2020-05-07 RX ORDER — TIMOLOL MALEATE 5 MG/ML
1 SOLUTION/ DROPS OPHTHALMIC 2 TIMES DAILY
Status: DISCONTINUED | OUTPATIENT
Start: 2020-05-07 | End: 2020-05-09 | Stop reason: HOSPADM

## 2020-05-07 RX ORDER — ACETAMINOPHEN 325 MG/1
650 TABLET ORAL EVERY 6 HOURS PRN
Status: DISCONTINUED | OUTPATIENT
Start: 2020-05-07 | End: 2020-05-09 | Stop reason: HOSPADM

## 2020-05-07 RX ORDER — LATANOPROST 50 UG/ML
1 SOLUTION/ DROPS OPHTHALMIC NIGHTLY
Status: DISCONTINUED | OUTPATIENT
Start: 2020-05-07 | End: 2020-05-09 | Stop reason: HOSPADM

## 2020-05-07 RX ORDER — ACETAMINOPHEN 650 MG/1
650 SUPPOSITORY RECTAL EVERY 6 HOURS PRN
Status: DISCONTINUED | OUTPATIENT
Start: 2020-05-07 | End: 2020-05-09 | Stop reason: HOSPADM

## 2020-05-07 RX ORDER — SODIUM CHLORIDE 0.9 % (FLUSH) 0.9 %
10 SYRINGE (ML) INJECTION EVERY 12 HOURS SCHEDULED
Status: DISCONTINUED | OUTPATIENT
Start: 2020-05-07 | End: 2020-05-09 | Stop reason: HOSPADM

## 2020-05-07 RX ORDER — IPRATROPIUM BROMIDE AND ALBUTEROL SULFATE 2.5; .5 MG/3ML; MG/3ML
1 SOLUTION RESPIRATORY (INHALATION)
Status: DISCONTINUED | OUTPATIENT
Start: 2020-05-08 | End: 2020-05-09 | Stop reason: HOSPADM

## 2020-05-07 RX ORDER — PRAVASTATIN SODIUM 20 MG
10 TABLET ORAL NIGHTLY
Status: DISCONTINUED | OUTPATIENT
Start: 2020-05-07 | End: 2020-05-09 | Stop reason: HOSPADM

## 2020-05-07 RX ORDER — CARVEDILOL 12.5 MG/1
12.5 TABLET ORAL 2 TIMES DAILY WITH MEALS
Status: DISCONTINUED | OUTPATIENT
Start: 2020-05-08 | End: 2020-05-09 | Stop reason: HOSPADM

## 2020-05-07 RX ORDER — LOSARTAN POTASSIUM 50 MG/1
50 TABLET ORAL 2 TIMES DAILY
Status: DISCONTINUED | OUTPATIENT
Start: 2020-05-07 | End: 2020-05-09 | Stop reason: HOSPADM

## 2020-05-07 RX ORDER — SODIUM CHLORIDE 0.9 % (FLUSH) 0.9 %
10 SYRINGE (ML) INJECTION PRN
Status: DISCONTINUED | OUTPATIENT
Start: 2020-05-07 | End: 2020-05-09 | Stop reason: HOSPADM

## 2020-05-07 RX ORDER — ASPIRIN 81 MG/1
81 TABLET ORAL DAILY
Status: DISCONTINUED | OUTPATIENT
Start: 2020-05-08 | End: 2020-05-09 | Stop reason: HOSPADM

## 2020-05-07 RX ORDER — ONDANSETRON 2 MG/ML
4 INJECTION INTRAMUSCULAR; INTRAVENOUS EVERY 6 HOURS PRN
Status: DISCONTINUED | OUTPATIENT
Start: 2020-05-07 | End: 2020-05-09 | Stop reason: HOSPADM

## 2020-05-07 RX ORDER — PROMETHAZINE HYDROCHLORIDE 25 MG/1
12.5 TABLET ORAL EVERY 6 HOURS PRN
Status: DISCONTINUED | OUTPATIENT
Start: 2020-05-07 | End: 2020-05-09 | Stop reason: HOSPADM

## 2020-05-07 RX ADMIN — INSULIN LISPRO 2 UNITS: 100 INJECTION, SOLUTION INTRAVENOUS; SUBCUTANEOUS at 23:34

## 2020-05-07 RX ADMIN — PRAVASTATIN SODIUM 10 MG: 20 TABLET ORAL at 23:34

## 2020-05-07 RX ADMIN — LATANOPROST 1 DROP: 50 SOLUTION OPHTHALMIC at 23:34

## 2020-05-07 RX ADMIN — TIMOLOL MALEATE 1 DROP: 5 SOLUTION/ DROPS OPHTHALMIC at 23:34

## 2020-05-07 RX ADMIN — SODIUM CHLORIDE, PRESERVATIVE FREE 10 ML: 5 INJECTION INTRAVENOUS at 23:35

## 2020-05-07 ASSESSMENT — ENCOUNTER SYMPTOMS
WHEEZING: 0
SHORTNESS OF BREATH: 1
VOMITING: 0

## 2020-05-07 ASSESSMENT — PAIN DESCRIPTION - PAIN TYPE: TYPE: ACUTE PAIN

## 2020-05-07 ASSESSMENT — PAIN DESCRIPTION - LOCATION: LOCATION: CHEST

## 2020-05-07 ASSESSMENT — PAIN SCALES - GENERAL: PAINLEVEL_OUTOF10: 5

## 2020-05-08 ENCOUNTER — READMISSION MANAGEMENT (OUTPATIENT)
Dept: CALL CENTER | Facility: HOSPITAL | Age: 60
End: 2020-05-08

## 2020-05-08 PROBLEM — E87.70 FLUID OVERLOAD: Status: ACTIVE | Noted: 2020-05-08

## 2020-05-08 LAB
ANION GAP SERPL CALCULATED.3IONS-SCNC: 15 MMOL/L (ref 7–19)
BUN BLDV-MCNC: 16 MG/DL (ref 8–23)
CALCIUM SERPL-MCNC: 9.1 MG/DL (ref 8.8–10.2)
CHLORIDE BLD-SCNC: 93 MMOL/L (ref 98–111)
CO2: 30 MMOL/L (ref 22–29)
CREAT SERPL-MCNC: 2.7 MG/DL (ref 0.5–0.9)
GFR NON-AFRICAN AMERICAN: 18
GLUCOSE BLD-MCNC: 193 MG/DL (ref 74–109)
GLUCOSE BLD-MCNC: 207 MG/DL (ref 70–99)
GLUCOSE BLD-MCNC: 247 MG/DL (ref 70–99)
HCT VFR BLD CALC: 33.2 % (ref 37–47)
HEMOGLOBIN: 9.3 G/DL (ref 12–16)
LV EF: 55 %
LVEF MODALITY: NORMAL
MCH RBC QN AUTO: 28.2 PG (ref 27–31)
MCHC RBC AUTO-ENTMCNC: 28 G/DL (ref 33–37)
MCV RBC AUTO: 100.6 FL (ref 81–99)
PDW BLD-RTO: 21.4 % (ref 11.5–14.5)
PERFORMED ON: ABNORMAL
PERFORMED ON: ABNORMAL
PLATELET # BLD: 222 K/UL (ref 130–400)
PMV BLD AUTO: 8.6 FL (ref 9.4–12.3)
POTASSIUM REFLEX MAGNESIUM: 3.8 MMOL/L (ref 3.5–5)
RBC # BLD: 3.3 M/UL (ref 4.2–5.4)
SODIUM BLD-SCNC: 138 MMOL/L (ref 136–145)
WBC # BLD: 5 K/UL (ref 4.8–10.8)

## 2020-05-08 PROCEDURE — 96372 THER/PROPH/DIAG INJ SC/IM: CPT

## 2020-05-08 PROCEDURE — 93306 TTE W/DOPPLER COMPLETE: CPT

## 2020-05-08 PROCEDURE — 6370000000 HC RX 637 (ALT 250 FOR IP): Performed by: HOSPITALIST

## 2020-05-08 PROCEDURE — 2580000003 HC RX 258: Performed by: HOSPITALIST

## 2020-05-08 PROCEDURE — 82947 ASSAY GLUCOSE BLOOD QUANT: CPT

## 2020-05-08 PROCEDURE — 85027 COMPLETE CBC AUTOMATED: CPT

## 2020-05-08 PROCEDURE — 80048 BASIC METABOLIC PNL TOTAL CA: CPT

## 2020-05-08 PROCEDURE — 94640 AIRWAY INHALATION TREATMENT: CPT

## 2020-05-08 PROCEDURE — 1210000000 HC MED SURG R&B

## 2020-05-08 PROCEDURE — 6360000002 HC RX W HCPCS: Performed by: HOSPITALIST

## 2020-05-08 PROCEDURE — 5A1D70Z PERFORMANCE OF URINARY FILTRATION, INTERMITTENT, LESS THAN 6 HOURS PER DAY: ICD-10-PCS | Performed by: INTERNAL MEDICINE

## 2020-05-08 PROCEDURE — 8010000000 HC HEMODIALYSIS ACUTE INPT

## 2020-05-08 PROCEDURE — 2700000000 HC OXYGEN THERAPY PER DAY

## 2020-05-08 PROCEDURE — 36415 COLL VENOUS BLD VENIPUNCTURE: CPT

## 2020-05-08 RX ADMIN — SERTRALINE HYDROCHLORIDE 25 MG: 50 TABLET ORAL at 08:15

## 2020-05-08 RX ADMIN — ASPIRIN 81 MG: 81 TABLET, COATED ORAL at 08:13

## 2020-05-08 RX ADMIN — IPRATROPIUM BROMIDE AND ALBUTEROL SULFATE 1 AMPULE: .5; 3 SOLUTION RESPIRATORY (INHALATION) at 19:01

## 2020-05-08 RX ADMIN — LOSARTAN POTASSIUM 50 MG: 50 TABLET, FILM COATED ORAL at 22:07

## 2020-05-08 RX ADMIN — SODIUM CHLORIDE, PRESERVATIVE FREE 10 ML: 5 INJECTION INTRAVENOUS at 22:10

## 2020-05-08 RX ADMIN — CARVEDILOL 12.5 MG: 12.5 TABLET, FILM COATED ORAL at 08:13

## 2020-05-08 RX ADMIN — IPRATROPIUM BROMIDE AND ALBUTEROL SULFATE 1 AMPULE: .5; 3 SOLUTION RESPIRATORY (INHALATION) at 06:46

## 2020-05-08 RX ADMIN — IPRATROPIUM BROMIDE AND ALBUTEROL SULFATE 1 AMPULE: .5; 3 SOLUTION RESPIRATORY (INHALATION) at 14:31

## 2020-05-08 RX ADMIN — SEVELAMER CARBONATE 800 MG: 800 TABLET, FILM COATED ORAL at 08:13

## 2020-05-08 RX ADMIN — INSULIN LISPRO 3 UNITS: 100 INJECTION, SOLUTION INTRAVENOUS; SUBCUTANEOUS at 22:22

## 2020-05-08 RX ADMIN — PRAVASTATIN SODIUM 10 MG: 20 TABLET ORAL at 22:05

## 2020-05-08 RX ADMIN — ACETAMINOPHEN 650 MG: 325 TABLET ORAL at 02:55

## 2020-05-08 RX ADMIN — CARVEDILOL 12.5 MG: 12.5 TABLET, FILM COATED ORAL at 17:24

## 2020-05-08 RX ADMIN — LOSARTAN POTASSIUM 50 MG: 50 TABLET, FILM COATED ORAL at 08:15

## 2020-05-08 RX ADMIN — LATANOPROST 1 DROP: 50 SOLUTION OPHTHALMIC at 22:04

## 2020-05-08 RX ADMIN — ENOXAPARIN SODIUM 30 MG: 30 INJECTION SUBCUTANEOUS at 08:13

## 2020-05-08 RX ADMIN — IPRATROPIUM BROMIDE AND ALBUTEROL SULFATE 1 AMPULE: .5; 3 SOLUTION RESPIRATORY (INHALATION) at 10:30

## 2020-05-08 RX ADMIN — TIMOLOL MALEATE 1 DROP: 5 SOLUTION/ DROPS OPHTHALMIC at 22:09

## 2020-05-08 RX ADMIN — SODIUM CHLORIDE, PRESERVATIVE FREE 10 ML: 5 INJECTION INTRAVENOUS at 08:16

## 2020-05-08 RX ADMIN — TIMOLOL MALEATE 1 DROP: 5 SOLUTION/ DROPS OPHTHALMIC at 08:12

## 2020-05-08 ASSESSMENT — PAIN SCALES - GENERAL: PAINLEVEL_OUTOF10: 2

## 2020-05-08 NOTE — OUTREACH NOTE
COPD/PN Week 1 Survey      Responses   [facility] patient discharged from?  [place]   COVID-19 Test Status  Negative   Does the patient have one of the following disease processes/diagnoses(primary or secondary)?  COPD/Pneumonia   Is there a successful TCM telephone encounter documented?  No   Was the primary reason for admission:  Pneumonia   Week 1 attempt successful?  No   Unsuccessful attempts  Attempt 3          Daniela Jones RN

## 2020-05-08 NOTE — PROGRESS NOTES
Sylvia Rodriguez arrived to room # 319. Presented with: fluid overload  Mental Status: Patient is oriented, alert, coherent, logical, thought processes intact and able to concentrate and follow conversation. Vitals:    05/07/20 2306   BP: (!) 153/65   Pulse: 92   Resp: 14   Temp: 97.1 °F (36.2 °C)   SpO2: 92%     Patient safety contract and falls prevention contract reviewed with patient Yes. Oriented Patient to room. Call light within reach. Yes.   Needs, issues or concerns expressed at this time: no.      Electronically signed by Enma Macario RN on 5/8/2020 at 1:47 AM

## 2020-05-08 NOTE — PLAN OF CARE
Problem: Falls - Risk of:  Goal: Will remain free from falls  Description: Will remain free from falls  5/8/2020 1227 by Carley Reeder RN  Outcome: Ongoing  5/8/2020 0246 by Romayne Double, RN  Outcome: Ongoing  Goal: Absence of physical injury  Description: Absence of physical injury  5/8/2020 1227 by Carley Reeder RN  Outcome: Ongoing  5/8/2020 0246 by Romayne Double, RN  Outcome: Ongoing     Problem: Fluid Volume:  Description: [TRUNCATED] Hyponatremia indicates a relatively greater amount of water to sodium in plasma. In hypovolemia, a deficit of both total body water and sodium exists but relatively less water deficit. Euvolemia represents near normal total body sodium co .. Clarene Akash [TRUNCATED] Hyponatremia indicates a relatively greater amount of water to sodium in plasma. In hypovolemia, a deficit of both total body water and sodium exists but relatively less water deficit. Euvolemia represents near normal total body sodium co ...   Goal: Hemodynamic stability will improve  Description: Hemodynamic stability will improve  5/8/2020 1227 by Carley Reeder RN  Outcome: Ongoing  5/8/2020 0246 by Romayne Double, RN  Outcome: Ongoing  Goal: Ability to maintain a balanced intake and output will improve  Description: Ability to maintain a balanced intake and output will improve  5/8/2020 1227 by Carley Reeder RN  Outcome: Ongoing  5/8/2020 0246 by Romayne Double, RN  Outcome: Ongoing  Goal: Will show no signs or symptoms of fluid imbalance  Description: Will show no signs or symptoms of fluid imbalance  5/8/2020 1227 by Carley Reeder RN  Outcome: Ongoing  5/8/2020 0246 by Romayne Double, RN  Outcome: Ongoing     Problem: Health Behavior:  Goal: Identification of resources available to assist in meeting health care needs will improve  Description: Identification of resources available to assist in meeting health care needs will improve  5/8/2020 1227 by Carley Reeder RN  Outcome: Ongoing  5/8/2020 0246 by Romayne Double, RN  Outcome: Ongoing  Goal: Complications related to the disease process, condition or treatment will be avoided or minimized  Description: Complications related to the disease process, condition or treatment will be avoided or minimized  5/8/2020 1227 by Hong Wray RN  Outcome: Ongoing  5/8/2020 0246 by Pablo Lindsey RN  Outcome: Ongoing     Problem: Sensory:  Goal: General experience of comfort will improve  Description: General experience of comfort will improve  5/8/2020 1227 by Hong Wray RN  Outcome: Ongoing  5/8/2020 0246 by Pablo Lindsey RN  Outcome: Ongoing     Problem: Skin Integrity:  Goal: Status of oral mucous membranes will improve  Description: Status of oral mucous membranes will improve  5/8/2020 1227 by Hong Wray RN  Outcome: Ongoing  5/8/2020 0246 by Pablo Lindsey RN  Outcome: Ongoing  Goal: Skin integrity will be maintained  Description: Skin integrity will be maintained  5/8/2020 1227 by Hong Wray RN  Outcome: Ongoing  5/8/2020 0246 by Pablo Lindsey RN  Outcome: Ongoing

## 2020-05-08 NOTE — H&P
[facility]      Hospitalist - History & Physical      PCP: RAMONA Ferrer    Date of Admission: 5/7/2020    Date of Service: 5/7/2020    Chief Complaint:  Just finished HD today came with SOB     History Of Present Illness: The patient is a 56 y.o. female with PMH of DM, HTN, ESRD, on HD T,TH, Sat,smoking? COPD who presented with above,  Just finished HD today came with SOB ?  Ran out of O2 at treatment, CXR CHF, BNP 35,000, troponin 0.05 , ABG showed PO2 53  Placed on O2 now %100     Past Medical History:        Diagnosis Date    Blind right eye     partial vision loss in the L eye too, due to DM    Blindness of one eye     Right     CKD (chronic kidney disease), stage IV (HCC)     secondary to diabetic nephropathy    Diabetes ([address]     Diabetes mellitus with ophthalmic manifestations     dx'd in 2000 but likely ongoing before that, was dx'd when she went blind in the R eye    Glaucoma     [facility] [address]     d[facility] tues[facility] thurs, [facility]    Hypertension     2000, dx'd at same time as the DM    Obesity     Renal osteodystrophy     Smoker     Type II or unspecified type diabetes mellitus with renal manifestations, uncontrolled(250.42)        Past Surgical History:        Procedure Laterality Date    CARPAL TUNNEL RELEASE      CATARACT REMOVAL     [address], 1978, and 1979    CHOLECYSTECTOMY      COLONOSCOPY N/A 3/9/2018    Dr ReevesCssefe-Obbrtsrziokrsc-Eoeebikxvmifs AP (-) dysplasia x 1, tubular AP x  (-) dysplasia x 1, HP x 4--1 yr recall    DIALYSIS FISTULA CREATION Left 4/23/15 S[facility]    Left proximal ulnar artery to cephalic vein AVF creation    EYE SURGERY      laser, several times     TYMPANOSTOMY TUBE PLACEMENT Bilateral     VASCULAR SURGERY Left 5/11/15 S[facility]    US-guided cannulation left distal upper arm cephalic vein with 4-Slovenian glide sheath; LUE AV f'grams with venography SVC; left cephalic vein BAM with [id number] julieth 28.48 kg/m²   General appearance: No apparent distress, appears stated age and cooperative. Well developed and well groomed. HEENT: Normal cephalic, atraumatic without obvious deformity. Pupils equal, round, and reactive to light. Extra ocular muscles intact. Conjunctivae/corneas clear. Normal ears and nose. Neck: Supple, with full range of motion. No jugular venous distention. Trachea midline. Thyroid no masses noted. Respiratory: Normal respiratory effort,bilateral Rales/Wheezes/Rhonchi. Cardiovascular: Regular rate and rhythm with normal S1/S2 without murmurs, rubs or gallops. Abdomen: Soft, non-tender, non-distended with normal bowel sounds. Musculoskeletal: No clubbing, cyanosis or edema bilaterally. Full range of motion without deformity in 4 extremities. Neurologic:  Neurovascularly intact without any focal sensory/motor deficits. Cranial nerves: II-XII intact, grossly non-focal.  Psychiatric: Alert and oriented, thought content appropriate, normal insight. Diagnostic Data:  Imaging:   XR CHEST PORTABLE   Final Result   . Congestive failure with interstitial edema and small   effusions. Signed by Dr Minh Whyte on 5/7/2020 8:24 PM        CBC:  Recent Labs     05/07/20 1937   WBC 6.4   HGB 10.3*   HCT 33.9*        BMP:  Recent Labs     05/07/20 1937 05/07/20  2155     --    K 3.2* 3.2   CL 89*  --    CO2 33*  --    BUN 9  --    CREATININE 2.1*  --    CALCIUM 9.5  --      Recent Labs     05/07/20 1937   AST 26   ALT 21   BILITOT 0.7   ALKPHOS 231*     Coag Panel: No results for input(s): INR, PROTIME, APTT in the last 72 hours.   Cardiac Enzymes:   Recent Labs     05/07/20 1945   TROPONINI 0.05*     ABGs:  Lab Results   Component Value Date    PHART 7.560 05/07/2020    PO2ART 53.0 05/07/2020    ULS8JUD 43.0 05/07/2020     Urinalysis:  Lab Results   Component Value Date    NITRU NEGATIVE 11/13/2012    GLUCOSEU NEGATIVE 11/13/2012       Active Hospital Problems    Diagnosis Date DC instructions

## 2020-05-08 NOTE — ED PROVIDER NOTES
[facility] 3 SAMEER/VAS/MED  eMERGENCY dEPARTMENT eNCOUnter      Pt Name: Yoana Nathan  MRN: [id number]  [place] 1960  Date of evaluation: 5/7/2020  Provider: Kelly Franco MD    [address] [place] [address]       Chief Complaint   Patient presents with    Shortness of Breath     T, TH, Sat dialysis, states \"ran out of oxygen\" at treatment         HISTORY OF PRESENT ILLNESS   (Location/Symptom, Timing/Onset,Context/Setting, Quality, Duration, Modifying Factors, Severity)  Note limiting factors. Yoana Nathan is a 56 y.o. female who presents to the emergency department      The history is provided by the patient. Shortness of Breath   Severity:  Moderate  Onset quality:  Unable to specify  Duration: last pm.  Timing:  Constant  Progression:  Unchanged  Chronicity:  Recurrent  Context comment:  Finished dialysis today  Relieved by:  Nothing  Worsened by: Activity  Associated symptoms: no chest pain, no diaphoresis, no fever, no vomiting and no wheezing    Risk factors comment:  ESRD, DM, HTN      NursingNotes were reviewed. REVIEW OF SYSTEMS    (2-9 systems for level 4, 10 or more for level 5)     Review of Systems   Constitutional: Negative for diaphoresis and fever. Respiratory: Positive for shortness of breath. Negative for wheezing. Cardiovascular: Negative for chest pain. Gastrointestinal: Negative for vomiting. All other systems reviewed and are negative. Except as noted above the remainder of the review of systems was reviewed and negative.        PAST MEDICAL HISTORY     Past Medical History:   Diagnosis Date    Blind right eye     partial vision loss in the L eye too, due to DM    Blindness of one eye     Right     CKD (chronic kidney disease), stage IV (HCC)     secondary to diabetic nephropathy    Diabetes [facility] [address]     Diabetes mellitus with ophthalmic manifestations     dx'd in 2000 but likely ongoing before that, was dx'd when she went blind in the R eye    Glaucoma     Hemodialysis patient [facility])     dialysis tues, thurs, sat. [address]    Hypertension     2000, dx'd at same time as the DM    Obesity     Renal osteodystrophy     Smoker     Type II or unspecified type diabetes mellitus with renal manifestations, uncontrolled(250.42)          SURGICALHISTORY       Past Surgical History:   Procedure Laterality Date    CARPAL TUNNEL RELEASE      CATARACT REMOVAL     [address] [place], and 1979    CHOLECYSTECTOMY      COLONOSCOPY N/A 3/9/2018    Dr ReevesEpvzel-Gnamtqbnbzgcme-Jkaahonhkthlq AP (-) dysplasia x 1, tubular AP x  (-) dysplasia x 1, HP x 4--1 yr recall    DIALYSIS FISTULA CREATION Left 4/23/15 S    Left proximal ulnar artery to cephalic vein AVF creation    EYE SURGERY      laser, several times     TYMPANOSTOMY TUBE PLACEMENT Bilateral     VASCULAR SURGERY Left 5/11/15 S    US-guided cannulation left distal upper arm cephalic vein with 4-Tajik glide sheath; LUE AV f'grams with venography SVC; left cephalic vein BAM with [id number] julieth balloon; completion venogram LUE    VASCULAR SURGERY Left 5/28/15 S    Percutaneous cannulation left distal radial artery with 4-Tajik glide sheath; LUE AV f'grams with venography SVC; left cephalic vein balloon a'plasty with [id number] julieth balloon and [id number] julieth balloon; proximal and mid upper arm cephalic vein BAM with [id number] julieth balloon; completion venogram E    VASCULAR SURGERY Left 6/15/15 S    US-guided cannulation left cephalic vein with 4-Tajik and later 7-Tajik sheath; LUE AV f'grams including venography SVC; left cephalic vein stenosis balloon a'plasty with [id number] cutting balloon; completion f'gram and venogram E    VASCULAR SURGERY  05/15/2017    S. Left upper fistulograms/venograms.  VASCULAR SURGERY  02/14/2018    [facility]. Left upper fistulograms, left subclavian BA 12x40 atlas, left cephalic arch BA 82B87 conquest.    VASCULAR SURGERY  02/13/2019    [facility]. Left upper ALLERGIES     Bupropion; Sulfa antibiotics; Varenicline; Wellbutrin [bupropion hcl];  Azithromycin; and Penicillins    FAMILY HISTORY       Family History   Problem Relation Age of Onset    Heart Disease Mother     Heart Attack Mother     Glaucoma Sister     Diabetes Sister     Diabetes Brother     Kidney Disease Brother     Blindness Brother     Stroke Maternal Grandmother     Stroke Maternal Grandfather     Colon Cancer Neg Hx     Colon Polyps Neg Hx     Liver Cancer Neg Hx     Liver Disease Neg Hx     Esophageal Cancer Neg Hx     Rectal Cancer Neg Hx     Stomach Cancer Neg Hx           SOCIAL HISTORY       Social History     Socioeconomic History    Marital status:      Spouse name: None    Number of children: None    Years of education: None    Highest education level: None   Occupational History    None   Social Needs    Financial resource strain: None    Food insecurity     Worry: None     Inability: None    Transportation needs     Medical: None     Non-medical: None   Tobacco Use    Smoking status: Current Every Day Smoker     Packs/day: 1.00     Years: 40.00     Pack years: 40.00    Smokeless tobacco: Never Used   Substance and Sexual Activity    Alcohol use: No     Alcohol/week: 0.0 standard drinks    Drug use: Yes     Types: Marijuana    Sexual activity: None   Lifestyle    Physical activity     Days per week: None     Minutes per session: None    Stress: None   Relationships    Social connections     Talks on phone: None     Gets together: None     Attends [employer]ism service: None     Active member of club or organization: None     Attends meetings of clubs or organizations: None     Relationship status: None    Intimate partner violence     Fear of current or ex partner: None     Emotionally abused: None     Physically abused: None     Forced sexual activity: None   Other Topics Concern    None   Social History Narrative    None       SCREENINGS    H[place]lock Coma Scale  Eye Opening: Spontaneous  Best Verbal Response: Oriented  Best Motor Response: Obeys commands  Vikki Coma Scale Score: 15 @FLOW([id number])@      PHYSICAL EXAM    (up to 7 for level 4, 8 or more for level 5)     ED Triage Vitals   BP Temp Temp Source Pulse Resp SpO2 Height Weight   05/07/20 1923 05/07/20 1923 05/07/20 1923 05/07/20 1923 05/07/20 1923 05/07/20 1923 05/07/20 1921 05/07/20 1921   (!) 184/88 98.8 °F (37.1 °C) Oral 94 18 90 % 4' 11\" (1.499 m) 141 lb (64 kg)       Physical Exam  Vitals signs and nursing note reviewed. Constitutional:       General: She is in acute distress (mild). Appearance: Normal appearance. She is not ill-appearing, toxic-appearing or diaphoretic. HENT:      Head: Normocephalic and atraumatic. Nose: Nose normal.      Mouth/Throat:      Mouth: Mucous membranes are moist.      Pharynx: Oropharynx is clear. Eyes:      Conjunctiva/sclera: Conjunctivae normal.   Neck:      Musculoskeletal: Normal range of motion and neck supple. Cardiovascular:      Rate and Rhythm: Normal rate and regular rhythm. Heart sounds: Murmur (holosystolic) present. Pulmonary:      Effort: Respiratory distress (mild) present. Breath sounds: Rales (bibasilar) present. No wheezing. Musculoskeletal:      Right lower leg: Edema (2+) present. Left lower leg: Edema (2+) present. Skin:     General: Skin is warm and dry. Neurological:      General: No focal deficit present. Mental Status: She is alert and oriented to person, place, and time. Psychiatric:      Comments: Depressed affect         DIAGNOSTIC RESULTS     EKG: All EKG's are interpreted by the Emergency Department Physician who either signs or Co-signsthis chart in the absence of a cardiologist.    EKG: sinus, VR 94, ?  Old inferior, some artifact, no injury    RADIOLOGY:   Non-plain filmimages such as CT, Ultrasound and MRI are read by the radiologist. Plain radiographic images are visualized and

## 2020-05-08 NOTE — CONSULTS
Renal Consult Note    Thank you to requesting provider: Dr Ryder Padilla, for asking us to see Juno Lancaster    Reason for consultation: ESRD    Chief Complaint:  Dyspnea    History of Presenting Illness      Patient is a 55 yo pleasant woman who has ESRD, hypertension and diabetes. She has been on chronic maintenance HD (TTS). She received her full treatment on 5/7 and has reached very close to her set dry weight but she remained dysneic and got worse overnight so she was brought to [facility] ED. Renal service was consulted to manage her ESRD. Patient is anuric. She also admitted non-productive cough without fever.      Past Medical/Surgical History      Active Ambulatory Problems     Diagnosis Date Noted    Diabetes mellitus with ophthalmic complication ([address]     Arthropathy 08/13/2012    Proteinuria 08/13/2012    Disorder of phosphorus metabolism 08/13/2012    HTN (hypertension) 04/18/2013    Glaucoma 12/03/2013    Diabetic nephropathy ([address] 04/15/2015    Breast density 12/28/2015    Dialysis AV fistula malfunction ([address] 02/14/2018    ESRD (end stage renal disease) on dialysis ([address] 02/21/2018    CKD (chronic kidney disease) stage V requiring chronic dialysis ([address]     Acute diastolic heart failure ([address] 01/27/2020     Resolved Ambulatory Problems     Diagnosis Date Noted    CKD (chronic kidney disease), stage V ([address] 08/13/2012    CKD (chronic kidney disease), stage IV ([address] 04/15/2015    CKD (chronic kidney disease) stage V requiring chronic dialysis ([address] 02/02/2018    Screening for colon cancer 02/21/2018     Past Medical History:   Diagnosis Date    Blind right eye     Blindness of one eye     Diabetes ([address]     Diabetes mellitus with ophthalmic manifestations     Hemodialysis patient ([address]     Hypertension     Obesity     Renal osteodystrophy     Smoker     Type II or unspecified type diabetes mellitus with renal manifestations, uncontrolled(250.42)          Review of Systems Problem Relation Age of Onset    Heart Disease Mother     Heart Attack Mother     Glaucoma Sister     Diabetes Sister     Diabetes Brother     Kidney Disease Brother     Blindness Brother     Stroke Maternal Grandmother     Stroke Maternal Grandfather     Colon Cancer Neg Hx     Colon Polyps Neg Hx     Liver Cancer Neg Hx     Liver Disease Neg Hx     Esophageal Cancer Neg Hx     Rectal Cancer Neg Hx     Stomach Cancer Neg Hx      Family history negative for kidney disease. Social History      Social History     Socioeconomic History    Marital status:      Spouse name: None    Number of children: None    Years of education: None    Highest education level: None   Occupational History    None   Social Needs    Financial resource strain: None    Food insecurity     Worry: None     Inability: None    Transportation needs     Medical: None     Non-medical: None   Tobacco Use    Smoking status: Current Every Day Smoker     Packs/day: 1.00     Years: 40.00     Pack years: 40.00    Smokeless tobacco: Never Used   Substance and Sexual Activity    Alcohol use: No     Alcohol/week: 0.0 standard drinks    Drug use: Yes     Types: Marijuana    Sexual activity: None   Lifestyle    Physical activity     Days per week: None     Minutes per session: None    Stress: None   Relationships    Social connections     Talks on phone: None     Gets together: None     Attends Druze service: None     Active member of club or organization: None     Attends meetings of clubs or organizations: None     Relationship status: None    Intimate partner violence     Fear of current or ex partner: None     Emotionally abused: None     Physically abused: None     Forced sexual activity: None   Other Topics Concern    None   Social History Narrative    None       Physical Exam     Blood pressure (!) 171/83, pulse 92, temperature 98.1 °F (36.7 °C), resp.  rate 16, height 4' 11\" (1.499 m), weight 137 lb

## 2020-05-09 ENCOUNTER — APPOINTMENT (OUTPATIENT)
Dept: GENERAL RADIOLOGY | Age: 60
DRG: 189 | End: 2020-05-09
Payer: MEDICARE

## 2020-05-09 VITALS
OXYGEN SATURATION: 97 % | TEMPERATURE: 98.5 F | HEIGHT: 59 IN | WEIGHT: 130.07 LBS | DIASTOLIC BLOOD PRESSURE: 79 MMHG | BODY MASS INDEX: 26.22 KG/M2 | HEART RATE: 91 BPM | SYSTOLIC BLOOD PRESSURE: 169 MMHG | RESPIRATION RATE: 18 BRPM

## 2020-05-09 LAB
ANION GAP SERPL CALCULATED.3IONS-SCNC: 14 MMOL/L (ref 7–19)
BASOPHILS ABSOLUTE: 0 K/UL (ref 0–0.2)
BASOPHILS RELATIVE PERCENT: 0.5 % (ref 0–1)
BUN BLDV-MCNC: 13 MG/DL (ref 8–23)
CALCIUM SERPL-MCNC: 9.4 MG/DL (ref 8.8–10.2)
CHLORIDE BLD-SCNC: 93 MMOL/L (ref 98–111)
CO2: 31 MMOL/L (ref 22–29)
CREAT SERPL-MCNC: 2.4 MG/DL (ref 0.5–0.9)
EKG P AXIS: 64 DEGREES
EKG P-R INTERVAL: 142 MS
EKG Q-T INTERVAL: 356 MS
EKG QRS DURATION: 78 MS
EKG QTC CALCULATION (BAZETT): 415 MS
EKG T AXIS: 0 DEGREES
EOSINOPHILS ABSOLUTE: 0.1 K/UL (ref 0–0.6)
EOSINOPHILS RELATIVE PERCENT: 2.8 % (ref 0–5)
GFR NON-AFRICAN AMERICAN: 20
GLUCOSE BLD-MCNC: 117 MG/DL (ref 70–99)
GLUCOSE BLD-MCNC: 156 MG/DL (ref 74–109)
GLUCOSE BLD-MCNC: 203 MG/DL (ref 70–99)
HCT VFR BLD CALC: 30.5 % (ref 37–47)
HEMOGLOBIN: 8.9 G/DL (ref 12–16)
IMMATURE GRANULOCYTES #: 0 K/UL
LYMPHOCYTES ABSOLUTE: 1 K/UL (ref 1.1–4.5)
LYMPHOCYTES RELATIVE PERCENT: 23.8 % (ref 20–40)
MAGNESIUM: 2.2 MG/DL (ref 1.6–2.4)
MCH RBC QN AUTO: 28.2 PG (ref 27–31)
MCHC RBC AUTO-ENTMCNC: 29.2 G/DL (ref 33–37)
MCV RBC AUTO: 96.5 FL (ref 81–99)
MONOCYTES ABSOLUTE: 0.5 K/UL (ref 0–0.9)
MONOCYTES RELATIVE PERCENT: 11.5 % (ref 0–10)
NEUTROPHILS ABSOLUTE: 2.5 K/UL (ref 1.5–7.5)
NEUTROPHILS RELATIVE PERCENT: 61.1 % (ref 50–65)
PDW BLD-RTO: 20.9 % (ref 11.5–14.5)
PERFORMED ON: ABNORMAL
PERFORMED ON: ABNORMAL
PHOSPHORUS: 1.7 MG/DL (ref 2.5–4.5)
PLATELET # BLD: 198 K/UL (ref 130–400)
PMV BLD AUTO: 9.1 FL (ref 9.4–12.3)
POTASSIUM SERPL-SCNC: 4.1 MMOL/L (ref 3.5–5)
RBC # BLD: 3.16 M/UL (ref 4.2–5.4)
SODIUM BLD-SCNC: 138 MMOL/L (ref 136–145)
TROPONIN: 0.06 NG/ML (ref 0–0.03)
WBC # BLD: 4 K/UL (ref 4.8–10.8)

## 2020-05-09 PROCEDURE — 8010000000 HC HEMODIALYSIS ACUTE INPT

## 2020-05-09 PROCEDURE — 83735 ASSAY OF MAGNESIUM: CPT

## 2020-05-09 PROCEDURE — 2700000000 HC OXYGEN THERAPY PER DAY

## 2020-05-09 PROCEDURE — 6370000000 HC RX 637 (ALT 250 FOR IP): Performed by: HOSPITALIST

## 2020-05-09 PROCEDURE — 71045 X-RAY EXAM CHEST 1 VIEW: CPT

## 2020-05-09 PROCEDURE — 93010 ELECTROCARDIOGRAM REPORT: CPT | Performed by: INTERNAL MEDICINE

## 2020-05-09 PROCEDURE — 84484 ASSAY OF TROPONIN QUANT: CPT

## 2020-05-09 PROCEDURE — 84100 ASSAY OF PHOSPHORUS: CPT

## 2020-05-09 PROCEDURE — 36415 COLL VENOUS BLD VENIPUNCTURE: CPT

## 2020-05-09 PROCEDURE — 94760 N-INVAS EAR/PLS OXIMETRY 1: CPT

## 2020-05-09 PROCEDURE — 85025 COMPLETE CBC W/AUTO DIFF WBC: CPT

## 2020-05-09 PROCEDURE — 80048 BASIC METABOLIC PNL TOTAL CA: CPT

## 2020-05-09 PROCEDURE — 94640 AIRWAY INHALATION TREATMENT: CPT

## 2020-05-09 PROCEDURE — 82947 ASSAY GLUCOSE BLOOD QUANT: CPT

## 2020-05-09 RX ADMIN — POTASSIUM & SODIUM PHOSPHATES POWDER PACK 280-160-250 MG 500 MG: 280-160-250 PACK at 10:10

## 2020-05-09 RX ADMIN — IPRATROPIUM BROMIDE AND ALBUTEROL SULFATE 1 AMPULE: .5; 3 SOLUTION RESPIRATORY (INHALATION) at 07:30

## 2020-05-09 RX ADMIN — ASPIRIN 81 MG: 81 TABLET, COATED ORAL at 13:58

## 2020-05-09 RX ADMIN — TIMOLOL MALEATE 1 DROP: 5 SOLUTION/ DROPS OPHTHALMIC at 07:50

## 2020-05-09 RX ADMIN — LOSARTAN POTASSIUM 50 MG: 50 TABLET, FILM COATED ORAL at 13:59

## 2020-05-09 RX ADMIN — IPRATROPIUM BROMIDE AND ALBUTEROL SULFATE 1 AMPULE: .5; 3 SOLUTION RESPIRATORY (INHALATION) at 11:11

## 2020-05-09 RX ADMIN — ACETAMINOPHEN 650 MG: 325 TABLET ORAL at 10:23

## 2020-05-09 RX ADMIN — SEVELAMER CARBONATE 800 MG: 800 TABLET, FILM COATED ORAL at 13:58

## 2020-05-09 RX ADMIN — CARVEDILOL 12.5 MG: 12.5 TABLET, FILM COATED ORAL at 13:58

## 2020-05-09 RX ADMIN — POTASSIUM & SODIUM PHOSPHATES POWDER PACK 280-160-250 MG 500 MG: 280-160-250 PACK at 13:59

## 2020-05-09 RX ADMIN — SERTRALINE HYDROCHLORIDE 25 MG: 50 TABLET ORAL at 13:58

## 2020-05-09 RX ADMIN — ACETAMINOPHEN 650 MG: 325 TABLET ORAL at 04:03

## 2020-05-09 RX ADMIN — IPRATROPIUM BROMIDE AND ALBUTEROL SULFATE 1 AMPULE: .5; 3 SOLUTION RESPIRATORY (INHALATION) at 15:02

## 2020-05-09 ASSESSMENT — PAIN SCALES - GENERAL
PAINLEVEL_OUTOF10: 0
PAINLEVEL_OUTOF10: 3
PAINLEVEL_OUTOF10: 2

## 2020-05-09 NOTE — DISCHARGE SUMMARY
[place] [address]      DEPARTMENT OF HOSPITALIST MEDICINE        DISCHARGE SUMMARY:      PATIENT NAME:  Lilia Vlichis  :  1960  MRN:  [id number]    Admission Date:   2020  7:25 PM Attending: Lui Dumont MD   Discharge Date:   2020              PCP: RAMONA Andre  Length of Stay: 1 days     Chief Complaint on Admission:   Chief Complaint   Patient presents with    Shortness of Breath     T, TH, Sat dialysis, states \"ran out of oxygen\" at treatment       Consultants:     IP CONSULT TO NEPHROLOGY       Discharge Problem List:   Principal Problem:    Fluid overload  Active Problems:    Diabetes mellitus with ophthalmic complication ([facility]    HTN (hypertension)    Diabetic nephropathy ([facility]    ESRD (end stage renal disease) on dialysis ([facility]    CKD (chronic kidney disease) stage V requiring chronic dialysis ([facility]  Resolved Problems:    * No resolved hospital problems. *       CUMULATIVE  HOSPITAL  COURSE  AND  TREATMENT:     Andmelissa  COURSE:     2020  I saw and evaluated the patient at the hemodialysis today. She is feeling much better after removal of 3.5 L of fluid, and she is having another 3.5 L being removed today. Her phosphate level was low at 1.7 for which I ordered oral supplementation x2 doses, 1 in HD and 1 before discharge from the hospital.  She has been advised to undergo hemodialysis as per her schedule and follow-up closely with renal and PCP. She is stable from medical standpoint to be discharged after hemodialysis today once cleared by renal.    2020  Patient underwent hemodialysis today and feels better after removal of 3.5 L of fluid. According to nephrologist, her dry weight needs to be decreased and she is going to have another hemodialysis tomorrow.     2020  Chief Complaint:  Just finished HD today came with SOB      History Of Present Illness: The patient is a 57 y. o. female with PMH of DM, HTN, ESRD, on HD ipratropium-albuterol (DUONEB) 0.5-2.5 (3) MG/3ML SOLN nebulizer solution  Inhale 3 mLs into the lungs every 4 hours             lactulose (CHRONULAC) 10 GM/15ML solution  TAKE 30 ML BY MOUTH THREE TIMES DAILY             losartan (COZAAR) 50 MG tablet  TAKE 1 TABLET BY MOUTH TWICE DAILY             midodrine (PROAMATINE) 5 MG tablet  TK 1 T PO HALF WAY THROUGH TREATMENT AS DIRECTED             pravastatin (PRAVACHOL) 10 MG tablet  TAKE 1 TABLET BY MOUTH EVERY NIGHT AT BEDTIME             sertraline (ZOLOFT) 25 MG tablet  Take 1 tablet by mouth daily             sevelamer (RENVELA) 800 MG tablet  Take 1 tablet by mouth daily              timolol (TIMOPTIC-XR) 0.5 % ophthalmic gel-forming  Place 1 drop into the left eye 2 times daily              Travoprost, ALLISON Free, (TRAVATAN Z) 0.004 % SOLN ophthalmic solution  1 drop nightly                   ISSUES TO BE ADDRESSED AT HOSPITAL FOLLOW-UP VISIT:  Advised patient to continue with hemodialysis as scheduled. Follow-up closely with renal and PCP for her chronic medical issues. She may be prescribed and monitored for chronic diuretic therapy by renal and PCP as well. Condition on Discharge: gradually improving  Discharge Disposition: Home    Recommended Follow Up:  RAMONA Blackmon  [address]  [phone]          Followup Appointments Scheduled at Time of Discharge:  Future Appointments   Date Time Provider Darrell Price   5/15/2020  9:30 AM RAMONA Blackmon [address] [facility]        Discharge Instructions:   Please see the discharge paperwork. Patient was seen at bedside today, and the examination shows improvement since yesterday. Detailed discharge directions delivered to the patient by myself and our nursing staff, who verbalizes understanding and is very happy and satisfied with the plan.  Patient has been advised to continue all medications as prescribed and advised, and f/u with PCP within 1 week. Patient is stable from medical standpoint to be discharged. Total time spent during patient evaluation and assessment, discussion with the nurse/family, addressing discharge medications/scripts and coordination of care for safe discharge was in excess of 35 minutes.       Signed Electronically:    Nina Childress MD  9:22 AM 5/9/2020

## 2020-05-09 NOTE — PROGRESS NOTES
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 [address], [facility]    Hypertension     2000, dx'd at same time as the DM    Obesity     Renal osteodystrophy     Smoker     Type II or unspecified type diabetes mellitus with renal manifestations, uncontrolled(250.42)          PAST SURGICAL HISTORY:  Past Surgical History:   Procedure Laterality Date    CARPAL TUNNEL RELEASE     [address], and 1979    CHOLECYSTECTOMY      COLONOSCOPY N/A 3/9/2018    Dr ReevesDevyth-Foqafyglftknat-Pmnsyjncyfiog AP (-) dysplasia x 1, tubular AP x  (-) dysplasia x 1, HP x 4--1 yr recall    DIALYSIS FISTULA CREATION Left 4/23/15 S    Left proximal ulnar artery to cephalic vein AVF creation    EYE SURGERY      laser, several times     TYMPANOSTOMY TUBE PLACEMENT Bilateral     VASCULAR SURGERY Left 5/11/15 S    US-guided cannulation left distal upper arm cephalic vein with 4-Vatican citizen glide sheath; LUE AV f'grams with venography SVC; left cephalic vein BAM with [id number] julieth balloon; completion venogram LUE    VASCULAR SURGERY Left 5/28/15 S    Percutaneous cannulation left distal radial artery with 4-Vatican citizen glide sheath; LUE AV f'grams with venography SVC; left cephalic vein balloon a'plasty with [id number] julieth balloon and [id number] julieth balloon; proximal and mid upper arm cephalic vein BAM with [id number] julieth balloon; completion venogram LUE    VASCULAR SURGERY Left 6/15/15 S    US-guided cannulation left cephalic vein with 4-Vatican citizen and later 7-Vatican citizen sheath; LUE AV f'grams including venography SVC; left cephalic vein stenosis balloon a'plasty with [id number] cutting balloon; completion f'gram and venogram LUE    VASCULAR SURGERY  05/15/2017    S. Left upper fistulograms/venograms.  VASCULAR SURGERY  02/14/2018    S. Left upper fistulograms,

## 2020-05-09 NOTE — PROGRESS NOTES
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 [address], [facility]    Hypertension     2000, dx'd at same time as the DM    Obesity     Renal osteodystrophy     Smoker     Type II or unspecified type diabetes mellitus with renal manifestations, uncontrolled(250.42)        Past Surgical History:  Past Surgical History:   Procedure Laterality Date    CARPAL TUNNEL RELEASE      CATARACT REMOVAL     [address] [place], and 1979    CHOLECYSTECTOMY      COLONOSCOPY N/A 3/9/2018    Dr ReevesDkwjnk-Spwirfwcgvuxia-Ibxhetuxpezfj AP (-) dysplasia x 1, tubular AP x  (-) dysplasia x 1, HP x 4--1 yr recall    DIALYSIS FISTULA CREATION Left 4/23/15 SJS    Left proximal ulnar artery to cephalic vein AVF creation    EYE SURGERY      laser, several times     TYMPANOSTOMY TUBE PLACEMENT Bilateral     VASCULAR SURGERY Left 5/11/15 S    US-guided cannulation left distal upper arm cephalic vein with 4-Tongan glide sheath; LUE AV f'grams with venography SVC; left cephalic vein BAM with [id number] julieth balloon; completion venogram LUE    VASCULAR SURGERY Left 5/28/15 SJS    Percutaneous cannulation left distal radial artery with 4-Tongan glide sheath; LUE AV f'grams with venography SVC; left cephalic vein balloon a'plasty with [id number] julieth balloon and [id number] julieth balloon; proximal and mid upper arm cephalic vein BAM with [id number] julieth balloon; completion venogram LUE    VASCULAR SURGERY Left 6/15/15 S    US-guided cannulation left cephalic vein with 4-Tongan and later 7-Tongan sheath; LUE AV f'grams including venography SVC; left cephalic vein stenosis balloon a'plasty with [id number] cutting balloon; completion f'gram and venogram E    VASCULAR SURGERY  05/15/2017    SJS. Left upper fistulograms/venograms.  VASCULAR SURGERY  02/14/2018    SJS. Left upper fistulograms, left subclavian BA hours.  CRP:  No results for input(s): CRP in the last 72 hours. Sed Rate:  No results for input(s): SEDRATE in the last 72 hours. Cultures:   No results for input(s): CULTURE in the last 72 hours. No results for input(s): BC, Phoebe Gent in the last 72 hours. No results for input(s): CXSURG in the last 72 hours. Radiology reports as per the Radiologist  Radiology: Xr Chest Portable    Result Date: 5/7/2020  EXAMINATION: Chest one view 5/7/2020 HISTORY: Shortness of breath FINDINGS: Today's exam is compared to previous study of 9/10/2019. A left subclavian stent is in place. There is pulmonary venous hypertension with cardiomegaly, vascular redistribution and interstitial pulmonary edema with small effusions. . Congestive failure with interstitial edema and small effusions. Signed by Dr Rosina Gill on 5/7/2020 8:24 PM       Assessment     1. ESRD  2. Type 2 DM with renal disease  3. Hypertension  4. Acute resp failure  5. nephrogenic pulmonary edema  6. Anemia of CKD     Plan:  Dialysis as above. UF goal was increased to 4 liters. Cancel discharge if patient remained symptomatic.      Meena Otero MD  05/09/20  9:10 AM

## 2020-05-11 ENCOUNTER — CARE COORDINATION (OUTPATIENT)
Dept: CASE MANAGEMENT | Age: 60
End: 2020-05-11

## 2020-05-11 ENCOUNTER — OFFICE VISIT (OUTPATIENT)
Age: 60
End: 2020-05-11
Payer: MEDICARE

## 2020-05-11 ENCOUNTER — READMISSION MANAGEMENT (OUTPATIENT)
Dept: CALL CENTER | Facility: HOSPITAL | Age: 60
End: 2020-05-11

## 2020-05-11 ENCOUNTER — TELEPHONE (OUTPATIENT)
Dept: PRIMARY CARE CLINIC | Age: 60
End: 2020-05-11

## 2020-05-11 VITALS
SYSTOLIC BLOOD PRESSURE: 134 MMHG | BODY MASS INDEX: 26.85 KG/M2 | TEMPERATURE: 97.2 F | HEIGHT: 59 IN | RESPIRATION RATE: 16 BRPM | WEIGHT: 133.2 LBS | OXYGEN SATURATION: 95 % | HEART RATE: 91 BPM | DIASTOLIC BLOOD PRESSURE: 86 MMHG

## 2020-05-11 DIAGNOSIS — R10.10 UPPER ABDOMINAL PAIN: ICD-10-CM

## 2020-05-11 LAB
ALBUMIN SERPL-MCNC: 4.4 G/DL (ref 3.5–5.2)
ALP BLD-CCNC: 227 U/L (ref 35–104)
ALT SERPL-CCNC: 31 U/L (ref 5–33)
AMYLASE: 50 U/L (ref 28–100)
ANION GAP SERPL CALCULATED.3IONS-SCNC: 18 MMOL/L (ref 7–19)
AST SERPL-CCNC: 30 U/L (ref 5–32)
BASOPHILS ABSOLUTE: 0 K/UL (ref 0–0.2)
BASOPHILS RELATIVE PERCENT: 0.5 % (ref 0–1)
BILIRUB SERPL-MCNC: 0.4 MG/DL (ref 0.2–1.2)
BUN BLDV-MCNC: 37 MG/DL (ref 8–23)
CALCIUM SERPL-MCNC: 9.6 MG/DL (ref 8.8–10.2)
CHLORIDE BLD-SCNC: 90 MMOL/L (ref 98–111)
CO2: 25 MMOL/L (ref 22–29)
CREAT SERPL-MCNC: 4.2 MG/DL (ref 0.5–0.9)
EOSINOPHILS ABSOLUTE: 0.1 K/UL (ref 0–0.6)
EOSINOPHILS RELATIVE PERCENT: 2.5 % (ref 0–5)
GFR NON-AFRICAN AMERICAN: 11
GLUCOSE BLD-MCNC: 251 MG/DL (ref 74–109)
HCT VFR BLD CALC: 34.6 % (ref 37–47)
HEMOGLOBIN: 10.1 G/DL (ref 12–16)
IMMATURE GRANULOCYTES #: 0 K/UL
LIPASE: 49 U/L (ref 13–60)
LYMPHOCYTES ABSOLUTE: 0.9 K/UL (ref 1.1–4.5)
LYMPHOCYTES RELATIVE PERCENT: 15 % (ref 20–40)
MCH RBC QN AUTO: 28.2 PG (ref 27–31)
MCHC RBC AUTO-ENTMCNC: 29.2 G/DL (ref 33–37)
MCV RBC AUTO: 96.6 FL (ref 81–99)
MONOCYTES ABSOLUTE: 0.3 K/UL (ref 0–0.9)
MONOCYTES RELATIVE PERCENT: 5.8 % (ref 0–10)
NEUTROPHILS ABSOLUTE: 4.3 K/UL (ref 1.5–7.5)
NEUTROPHILS RELATIVE PERCENT: 76 % (ref 50–65)
PDW BLD-RTO: 20.4 % (ref 11.5–14.5)
PLATELET # BLD: 220 K/UL (ref 130–400)
PMV BLD AUTO: 9.1 FL (ref 9.4–12.3)
POTASSIUM SERPL-SCNC: 5.4 MMOL/L (ref 3.5–5)
RBC # BLD: 3.58 M/UL (ref 4.2–5.4)
SODIUM BLD-SCNC: 133 MMOL/L (ref 136–145)
TOTAL PROTEIN: 7.2 G/DL (ref 6.6–8.7)
WBC # BLD: 5.7 K/UL (ref 4.8–10.8)

## 2020-05-11 PROCEDURE — 99213 OFFICE O/P EST LOW 20 MIN: CPT | Performed by: NURSE PRACTITIONER

## 2020-05-11 PROCEDURE — 3017F COLORECTAL CA SCREEN DOC REV: CPT | Performed by: NURSE PRACTITIONER

## 2020-05-11 PROCEDURE — 4004F PT TOBACCO SCREEN RCVD TLK: CPT | Performed by: NURSE PRACTITIONER

## 2020-05-11 PROCEDURE — G8427 DOCREV CUR MEDS BY ELIG CLIN: HCPCS | Performed by: NURSE PRACTITIONER

## 2020-05-11 PROCEDURE — G8417 CALC BMI ABV UP PARAM F/U: HCPCS | Performed by: NURSE PRACTITIONER

## 2020-05-11 PROCEDURE — 1111F DSCHRG MED/CURRENT MED MERGE: CPT | Performed by: NURSE PRACTITIONER

## 2020-05-11 ASSESSMENT — ENCOUNTER SYMPTOMS
STRIDOR: 0
SHORTNESS OF BREATH: 1
WHEEZING: 0
VOMITING: 1
COUGH: 0
NAUSEA: 1
ABDOMINAL PAIN: 1

## 2020-05-11 NOTE — PROGRESS NOTES
DAILY TO INJECT INSULIN DAILY 100 each 3    carvedilol (COREG) 12.5 MG tablet Take 12.5 mg by mouth 2 times daily (with meals)      insulin glargine (LANTUS SOLOSTAR) 100 UNIT/ML injection pen INJECT 10 UNITS INTO THE SKIN ONCE DAILY 15 mL 0    lactulose (CHRONULAC) 10 GM/15ML solution TAKE 30 ML BY MOUTH THREE TIMES DAILY 8108 mL 1    ipratropium-albuterol (DUONEB) 0.5-2.5 (3) MG/3ML SOLN nebulizer solution Inhale 3 mLs into the lungs every 4 hours 360 mL 1    famotidine (PEPCID) 20 MG tablet TAKE 1 TABLET BY MOUTH TWICE DAILY 60 tablet 11    Cholecalciferol (VITAMIN D3) 1000 units CAPS Take by mouth      Travoprost, ALLISON Free, (TRAVATAN Z) 0.004 % SOLN ophthalmic solution 1 drop nightly      midodrine (PROAMATINE) 5 MG tablet TK 1 T PO HALF WAY THROUGH TREATMENT AS DIRECTED      sevelamer (RENVELA) 800 MG tablet Take 1 tablet by mouth daily       aspirin EC 81 MG EC tablet Take 81 mg by mouth daily      FREESTYLE LITE strip USE TO TEST BLOOD SUGAR TWICE DAILY 100 strip 0    Cetirizine HCl (ZYRTEC ALLERGY PO) Take 10 mg by mouth daily       fluticasone (FLONASE) 50 MCG/ACT nasal spray 2 sprays by Nasal route daily. To keep ears opened up 1 Bottle 11    Blood Glucose Monitoring Suppl NIKOS by Does not apply route. Pt will also need new lancet device if not included in kit. 1 Device 0    Glucose Blood (BLOOD GLUCOSE TEST STRIPS) STRP Check blood sugar twice a day for recent medication adjustments. 100 strip 11    bimatoprost 0.01 % SOLN Apply 1 drop to eye nightly. One drop in left eye at hs      Insulin Syringe-Needle U-100 (ACCUSURE INS SYR 1CC/31GX5/16\") 31G X 5/16\" 1 ML MISC by Does not apply route.  100 each 3    timolol (TIMOPTIC-XR) 0.5 % ophthalmic gel-forming Place 1 drop into the left eye 2 times daily       dorzolamide (TRUSOPT) 2 % ophthalmic solution Place 1 drop into the left eye 2 times daily       brimonidine (ALPHAGAN P) 0.15 % ophthalmic solution Place 1 drop into the left eye 3 times daily       homatropine 5 % ophthalmic solution Place 1 drop into the left eye daily       sertraline (ZOLOFT) 25 MG tablet Take 1 tablet by mouth daily 30 tablet 11    pravastatin (PRAVACHOL) 10 MG tablet TAKE 1 TABLET BY MOUTH EVERY NIGHT AT BEDTIME 30 tablet 11    blood glucose monitor kit and supplies 1 kit by Other route 3 times daily Test three times a day & as needed for symptoms of irregular blood glucose. 1 kit 0     No current facility-administered medications for this visit. Allergies   Allergen Reactions    Bupropion Nausea Only    Sulfa Antibiotics      Causes hypoglycemia     Varenicline     Wellbutrin [Bupropion Hcl] Nausea Only    Azithromycin Rash    Penicillins Rash       Health Maintenance   Topic Date Due    DTaP/Tdap/Td vaccine (1 - Tdap) 03/20/1977    Shingles Vaccine (1 of 2) 03/20/2008    Lipid screen  03/19/2019    Breast cancer screen  03/23/2019    Annual Wellness Visit (AWV)  05/29/2019    Diabetic retinal exam  09/25/2019    Diabetic foot exam  01/23/2020    A1C test (Diabetic or Prediabetic)  09/25/2020    Low dose CT lung screening  01/19/2021    Colon cancer screen colonoscopy  03/09/2021    Potassium monitoring  05/09/2021    Creatinine monitoring  05/09/2021    Cervical cancer screen  03/23/2023    Flu vaccine  Completed    Pneumococcal 0-64 years Vaccine  Completed    Hepatitis C screen  Completed    HIV screen  Completed    Hepatitis A vaccine  Aged Out    Hib vaccine  Aged Out    Meningococcal (ACWY) vaccine  Aged Out       Subjective:      Review of Systems   Constitutional: Negative for fatigue and fever. HENT: Negative. Respiratory: Positive for shortness of breath. Negative for cough, wheezing and stridor. Gastrointestinal: Positive for abdominal pain, nausea and vomiting. Objective:     Physical Exam  Vitals signs and nursing note reviewed. Constitutional:       General: She is not in acute distress.      Appearance: renal specialist was notified at HD. She states the pain is not better or worse. She is short of breath at times. She states this is not unusual. She continues to smoke. She does have diminished lung sounds. She has had a recent chest xray. I will not re-check this because the xray was taken since this started. I will get labs. She has a follow up with PCP on Friday. Will send labs to PCP. If pain worsens, go to ED. She currently has no N/V/D. Her bowel sounds are active. She was advised to f/u with HD and specialists as scheduled. She is to use albuterol inhaler OR duonebs when SOB up to every 4 hours. COVID testing was completed today due to SOB, co morbidities and recent hospitalizations. Return if symptoms worsen or fail to improve. Patient given educational materials - see patient instructions. Discussed use, benefit, and side effects of prescribed medications. All patient questions answered. Pt voiced understanding. Patient agreed with treatment plan.  Follow up as needed      Electronically signed by Bony Neely on 5/11/2020 at 12:41 PM

## 2020-05-11 NOTE — OUTREACH NOTE
COPD/PN Week 2 Survey      Responses   [facility] patient discharged from?  [place]   COVID-19 Test Status  Negative   Does the patient have one of the following disease processes/diagnoses(primary or secondary)?  COPD/Pneumonia   Was the primary reason for admission:  Pneumonia   Week 2 attempt successful?  No   Unsuccessful attempts  Attempt 1          Daniela Jones RN

## 2020-05-12 ENCOUNTER — CARE COORDINATION (OUTPATIENT)
Dept: CASE MANAGEMENT | Age: 60
End: 2020-05-12

## 2020-05-12 ENCOUNTER — TELEPHONE (OUTPATIENT)
Dept: PRIMARY CARE CLINIC | Age: 60
End: 2020-05-12

## 2020-05-12 NOTE — CARE COORDINATION
[facility] Transitions Initial Follow Up Call    Call within 2 business days of discharge: Yes    Patient: Angel Rain Patient : 1960   MRN: [id number]   Discharge Date: 20 RARS: Readmission Risk Score: 17      Last Discharge [facility]       Complaint Diagnosis Description Type Department Provider    20 Shortness of Breath Acute congestive heart failure, unspecified heart failure type ([address] . .. ED to Hosp-Admission (Discharged) (ADMITTED) Jesi Garcia MD; German Kingsley. .. Spoke with: N/A    Facility: [address] [place] [address]       Non-face-to-face services provided:  Reviewed encounter information for continuity of care prior to follow up phone call - chart notes, consults, progress notes, test results, med list, appointments, AVS, other information. Care Transitions 24 Hour Call    Care Transitions Interventions         Follow Up: Second attempt to make contact with patient for a follow up call post discharge from the hospital without success. Unable to leave a message regarding intent of call and call back information. Will try again at a later time.        Future Appointments   Date Time Provider Darrell Price   5/15/2020  9:30 AM Ronaldo Phoenix, [address] CASEY-ANN Mckinley RN

## 2020-05-13 ENCOUNTER — CARE COORDINATION (OUTPATIENT)
Dept: CASE MANAGEMENT | Age: 60
End: 2020-05-13

## 2020-05-13 NOTE — CARE COORDINATION
fluid restriction, just \"watch your fluids\" and she is not sure what that means. I told her to ask when she goes to the HD clinic tomorrow if she has a specific number of ccs. She does not have her meds close by, unable to do a reconciliation. No other issues noted. Discussed COVID 19 information as below. Patient contacted regarding Illa Clink. Care Transition Nurse  contacted the patient by telephone to perform post discharge assessment. Verified name and  with patient as identifiers. Provided introduction to self, and explanation of the CTN role and reason for call due to risk factors for infection and/or exposure to COVID-19. Symptoms reviewed with patient who verbalized the following symptoms: no worsening symptoms. Due to no new or worsening symptoms encounter was not routed to provider for escalation. Patient has following risk factors of: chronic kidney disease. CTN reviewed discharge instructions, medical action plan and red flags such as increased shortness of breath, increasing fever and signs of decompensation with patient who verbalized understanding. Discussed exposure protocols and quarantine with CDC Guidelines What to do if you are sick with coronavirus disease .  Patient was given an opportunity for questions and concerns. The patient agrees to contact the [facility] exposure line [phone], local health department [facility] Department of Health: ([phone]) and PCP office for questions related to their healthcare. CTN provided contact information for future needs. Reviewed and educated patient on any new and changed medications related to discharge diagnosis     Patient/family/caregiver given information for [facility] and agrees to enroll no    Plan for follow-up call in 3-5 days based on severity of symptoms and risk factors.     Future Appointments   Date Time Provider Darrell Price   5/15/2020  9:30 AM RAMONA Cobb [address] [facility] [id number]

## 2020-05-14 ENCOUNTER — READMISSION MANAGEMENT (OUTPATIENT)
Dept: CALL CENTER | Facility: HOSPITAL | Age: 60
End: 2020-05-14

## 2020-05-14 LAB
REPORT: NORMAL
SARS-COV-2: NOT DETECTED
THIS TEST SENT TO: NORMAL

## 2020-05-14 NOTE — OUTREACH NOTE
COPD/PN Week 2 Survey      Responses   [facility] patient discharged from?  [place]   COVID-19 Test Status  Negative   Does the patient have one of the following disease processes/diagnoses(primary or secondary)?  COPD/Pneumonia   Was the primary reason for admission:  Pneumonia   Week 2 attempt successful?  Yes   Call start time  1243   Call end time  1250   General alerts for this patient  history of noncompliance with dialysis and several admissions for volume overload   Discharge diagnosis  Pneumonia,   UTI   Meds reviewed with patient/caregiver?  Yes   Is the patient having any side effects they believe may be caused by any medication additions or changes?  No   Does the patient have all medications ordered at discharge?  Yes   Is the patient taking all medications as directed (includes completed medication regime)?  Yes   Comments regarding appointments  Dialysis - Tuesday Thursday and Saturday   Does the patient have a primary care provider?   Yes   Does the patient have an appointment with their PCP or pulmonologist within 7 days of discharge?  Greater than 7 days   Comments regarding PCP  PCP:  Bharati Dahl APRN   Has the patient kept scheduled appointments due by today?  N/A   Comments  Patient reports keeping dialysis appts.    What is the Home health agency?   [facility]   Pulse Ox monitoring  Intermittent   Pulse Ox device source  Patient   O2 Sat comments  92-98% on room air   O2 Sat: education provided  Sat levels   Psychosocial issues?  No   Comments  Is using O2 at dialysis at times   Did the patient receive a copy of their discharge instructions?  Yes   Nursing interventions  Reviewed instructions with patient   What is the patient's perception of their health status since discharge?  Improving   Nursing Interventions  Nurse provided patient education   Nursing interventions  Educated on importance of maintaining up to date immunizations as advised by provider   Is the  patient/caregiver able to teach back the hierarchy of who to call/visit for symptoms/problems? PCP, Specialist, Home health nurse, Urgent Care, ED, 911  Yes   Is the patient/caregiver able to teach back signs and symptoms of worsening condition:  Fever/chills, Shortness of breath, Chest pain   Is the patient/caregiver able to teach back importance of completing antibiotic course of treatment?  Yes   Week 2 call completed?  Yes          Kami Jones RN

## 2020-05-16 ENCOUNTER — HOSPITAL ENCOUNTER (INPATIENT)
Age: 60
LOS: 2 days | Discharge: HOME HEALTH | DRG: 640 | End: 2020-05-18
Attending: HOSPITALIST | Admitting: HOSPITALIST
Payer: MEDICARE

## 2020-05-16 ENCOUNTER — APPOINTMENT (OUTPATIENT)
Dept: GENERAL RADIOLOGY | Age: 60
DRG: 640 | End: 2020-05-16
Payer: MEDICARE

## 2020-05-16 PROBLEM — R06.02 SOB (SHORTNESS OF BREATH): Status: ACTIVE | Noted: 2020-05-16

## 2020-05-16 LAB
ALBUMIN SERPL-MCNC: 4.1 G/DL (ref 3.5–5.2)
ALP BLD-CCNC: 201 U/L (ref 35–104)
ALT SERPL-CCNC: 19 U/L (ref 5–33)
ANION GAP SERPL CALCULATED.3IONS-SCNC: 12 MMOL/L (ref 7–19)
AST SERPL-CCNC: 18 U/L (ref 5–32)
BASE EXCESS ARTERIAL: 11.6 MMOL/L (ref -2–2)
BASOPHILS ABSOLUTE: 0 K/UL (ref 0–0.2)
BASOPHILS RELATIVE PERCENT: 0.9 % (ref 0–1)
BILIRUB SERPL-MCNC: 0.5 MG/DL (ref 0.2–1.2)
BUN BLDV-MCNC: 9 MG/DL (ref 8–23)
CALCIUM SERPL-MCNC: 9.4 MG/DL (ref 8.8–10.2)
CARBOXYHEMOGLOBIN ARTERIAL: 2.4 % (ref 0–5)
CHLORIDE BLD-SCNC: 90 MMOL/L (ref 98–111)
CO2: 35 MMOL/L (ref 22–29)
CREAT SERPL-MCNC: 2.1 MG/DL (ref 0.5–0.9)
EOSINOPHILS ABSOLUTE: 0.2 K/UL (ref 0–0.6)
EOSINOPHILS RELATIVE PERCENT: 4.6 % (ref 0–5)
GFR NON-AFRICAN AMERICAN: 24
GLUCOSE BLD-MCNC: 146 MG/DL (ref 74–109)
HCO3 ARTERIAL: 35.9 MMOL/L (ref 22–26)
HCT VFR BLD CALC: 33.6 % (ref 37–47)
HEMOGLOBIN, ART, EXTENDED: 10.1 G/DL (ref 12–16)
HEMOGLOBIN: 10.2 G/DL (ref 12–16)
IMMATURE GRANULOCYTES #: 0 K/UL
LACTIC ACID: 0.9 MMOL/L (ref 0.5–1.9)
LYMPHOCYTES ABSOLUTE: 0.8 K/UL (ref 1.1–4.5)
LYMPHOCYTES RELATIVE PERCENT: 23.1 % (ref 20–40)
MCH RBC QN AUTO: 27.9 PG (ref 27–31)
MCHC RBC AUTO-ENTMCNC: 30.4 G/DL (ref 33–37)
MCV RBC AUTO: 92.1 FL (ref 81–99)
METHEMOGLOBIN ARTERIAL: 1 %
MONOCYTES ABSOLUTE: 0.3 K/UL (ref 0–0.9)
MONOCYTES RELATIVE PERCENT: 7.2 % (ref 0–10)
NEUTROPHILS ABSOLUTE: 2.2 K/UL (ref 1.5–7.5)
NEUTROPHILS RELATIVE PERCENT: 63.9 % (ref 50–65)
O2 CONTENT ARTERIAL: 12.4 ML/DL
O2 SAT, ARTERIAL: 87.3 %
O2 THERAPY: ABNORMAL
PCO2 ARTERIAL: 45 MMHG (ref 35–45)
PDW BLD-RTO: 19.1 % (ref 11.5–14.5)
PH ARTERIAL: 7.51 (ref 7.35–7.45)
PLATELET # BLD: 249 K/UL (ref 130–400)
PMV BLD AUTO: 8.6 FL (ref 9.4–12.3)
PO2 ARTERIAL: 51 MMHG (ref 80–100)
POTASSIUM SERPL-SCNC: 3.5 MMOL/L (ref 3.5–5)
POTASSIUM, WHOLE BLOOD: 3.6
PRO-BNP: ABNORMAL PG/ML (ref 0–900)
RBC # BLD: 3.65 M/UL (ref 4.2–5.4)
SODIUM BLD-SCNC: 137 MMOL/L (ref 136–145)
TOTAL PROTEIN: 7.2 G/DL (ref 6.6–8.7)
TROPONIN: 0.05 NG/ML (ref 0–0.03)
TROPONIN: 0.05 NG/ML (ref 0–0.03)
WBC # BLD: 3.5 K/UL (ref 4.8–10.8)

## 2020-05-16 PROCEDURE — 87040 BLOOD CULTURE FOR BACTERIA: CPT

## 2020-05-16 PROCEDURE — 99285 EMERGENCY DEPT VISIT HI MDM: CPT

## 2020-05-16 PROCEDURE — 71045 X-RAY EXAM CHEST 1 VIEW: CPT

## 2020-05-16 PROCEDURE — 36600 WITHDRAWAL OF ARTERIAL BLOOD: CPT

## 2020-05-16 PROCEDURE — 84132 ASSAY OF SERUM POTASSIUM: CPT

## 2020-05-16 PROCEDURE — 83880 ASSAY OF NATRIURETIC PEPTIDE: CPT

## 2020-05-16 PROCEDURE — 82803 BLOOD GASES ANY COMBINATION: CPT

## 2020-05-16 PROCEDURE — 80053 COMPREHEN METABOLIC PANEL: CPT

## 2020-05-16 PROCEDURE — 93005 ELECTROCARDIOGRAM TRACING: CPT | Performed by: NURSE PRACTITIONER

## 2020-05-16 PROCEDURE — 6370000000 HC RX 637 (ALT 250 FOR IP): Performed by: NURSE PRACTITIONER

## 2020-05-16 PROCEDURE — 94640 AIRWAY INHALATION TREATMENT: CPT

## 2020-05-16 PROCEDURE — 85025 COMPLETE CBC W/AUTO DIFF WBC: CPT

## 2020-05-16 PROCEDURE — 1210000000 HC MED SURG R&B

## 2020-05-16 PROCEDURE — 84484 ASSAY OF TROPONIN QUANT: CPT

## 2020-05-16 PROCEDURE — 36415 COLL VENOUS BLD VENIPUNCTURE: CPT

## 2020-05-16 PROCEDURE — 83605 ASSAY OF LACTIC ACID: CPT

## 2020-05-16 PROCEDURE — 6370000000 HC RX 637 (ALT 250 FOR IP): Performed by: HOSPITALIST

## 2020-05-16 RX ORDER — FAMOTIDINE 20 MG/1
20 TABLET, FILM COATED ORAL 2 TIMES DAILY
Status: DISCONTINUED | OUTPATIENT
Start: 2020-05-16 | End: 2020-05-16

## 2020-05-16 RX ORDER — BRIMONIDINE TARTRATE 2 MG/ML
1 SOLUTION/ DROPS OPHTHALMIC 2 TIMES DAILY
Status: DISCONTINUED | OUTPATIENT
Start: 2020-05-16 | End: 2020-05-18 | Stop reason: HOSPADM

## 2020-05-16 RX ORDER — FLUTICASONE PROPIONATE 50 MCG
2 SPRAY, SUSPENSION (ML) NASAL DAILY
Status: DISCONTINUED | OUTPATIENT
Start: 2020-05-17 | End: 2020-05-18 | Stop reason: HOSPADM

## 2020-05-16 RX ORDER — CETIRIZINE HYDROCHLORIDE 10 MG/1
10 TABLET ORAL DAILY
Status: DISCONTINUED | OUTPATIENT
Start: 2020-05-17 | End: 2020-05-18 | Stop reason: HOSPADM

## 2020-05-16 RX ORDER — CARVEDILOL 12.5 MG/1
12.5 TABLET ORAL 2 TIMES DAILY WITH MEALS
Status: DISCONTINUED | OUTPATIENT
Start: 2020-05-17 | End: 2020-05-18 | Stop reason: HOSPADM

## 2020-05-16 RX ORDER — HOMATROPINE HYDROBROMIDE OPHTHALMIC 50 MG/ML
1 SOLUTION OPHTHALMIC DAILY
Status: DISCONTINUED | OUTPATIENT
Start: 2020-05-17 | End: 2020-05-18 | Stop reason: HOSPADM

## 2020-05-16 RX ORDER — DORZOLAMIDE HCL 20 MG/ML
1 SOLUTION/ DROPS OPHTHALMIC 2 TIMES DAILY
Status: DISCONTINUED | OUTPATIENT
Start: 2020-05-16 | End: 2020-05-18 | Stop reason: HOSPADM

## 2020-05-16 RX ORDER — FAMOTIDINE 20 MG/1
20 TABLET, FILM COATED ORAL DAILY
Status: DISCONTINUED | OUTPATIENT
Start: 2020-05-17 | End: 2020-05-18 | Stop reason: HOSPADM

## 2020-05-16 RX ORDER — IPRATROPIUM BROMIDE AND ALBUTEROL SULFATE 2.5; .5 MG/3ML; MG/3ML
1 SOLUTION RESPIRATORY (INHALATION) ONCE
Status: COMPLETED | OUTPATIENT
Start: 2020-05-16 | End: 2020-05-16

## 2020-05-16 RX ORDER — LATANOPROST 50 UG/ML
1 SOLUTION/ DROPS OPHTHALMIC NIGHTLY
Status: DISCONTINUED | OUTPATIENT
Start: 2020-05-16 | End: 2020-05-16

## 2020-05-16 RX ORDER — SEVELAMER CARBONATE 800 MG/1
800 TABLET, FILM COATED ORAL DAILY
Status: DISCONTINUED | OUTPATIENT
Start: 2020-05-17 | End: 2020-05-18 | Stop reason: HOSPADM

## 2020-05-16 RX ORDER — LACTULOSE 10 G/15ML
30 SOLUTION ORAL 3 TIMES DAILY
Status: DISCONTINUED | OUTPATIENT
Start: 2020-05-16 | End: 2020-05-17

## 2020-05-16 RX ORDER — PRAVASTATIN SODIUM 20 MG
10 TABLET ORAL NIGHTLY
Status: DISCONTINUED | OUTPATIENT
Start: 2020-05-16 | End: 2020-05-18 | Stop reason: HOSPADM

## 2020-05-16 RX ORDER — DICYCLOMINE HYDROCHLORIDE 10 MG/1
10 CAPSULE ORAL 4 TIMES DAILY
Status: DISCONTINUED | OUTPATIENT
Start: 2020-05-16 | End: 2020-05-18 | Stop reason: HOSPADM

## 2020-05-16 RX ORDER — ASPIRIN 81 MG/1
81 TABLET ORAL DAILY
Status: DISCONTINUED | OUTPATIENT
Start: 2020-05-17 | End: 2020-05-18 | Stop reason: HOSPADM

## 2020-05-16 RX ORDER — LATANOPROST 50 UG/ML
1 SOLUTION/ DROPS OPHTHALMIC DAILY
Status: DISCONTINUED | OUTPATIENT
Start: 2020-05-17 | End: 2020-05-18 | Stop reason: HOSPADM

## 2020-05-16 RX ORDER — LOSARTAN POTASSIUM 50 MG/1
50 TABLET ORAL 2 TIMES DAILY
Status: DISCONTINUED | OUTPATIENT
Start: 2020-05-16 | End: 2020-05-18 | Stop reason: HOSPADM

## 2020-05-16 RX ORDER — TIMOLOL MALEATE 5 MG/ML
1 SOLUTION/ DROPS OPHTHALMIC 2 TIMES DAILY
Status: DISCONTINUED | OUTPATIENT
Start: 2020-05-16 | End: 2020-05-18 | Stop reason: HOSPADM

## 2020-05-16 RX ADMIN — LOSARTAN POTASSIUM 50 MG: 50 TABLET, FILM COATED ORAL at 23:32

## 2020-05-16 RX ADMIN — DICYCLOMINE HYDROCHLORIDE 10 MG: 10 CAPSULE ORAL at 23:32

## 2020-05-16 RX ADMIN — DORZOLAMIDE HYDROCHLORIDE 1 DROP: 20 SOLUTION/ DROPS OPHTHALMIC at 23:32

## 2020-05-16 RX ADMIN — TIMOLOL MALEATE 1 DROP: 5 SOLUTION/ DROPS OPHTHALMIC at 23:32

## 2020-05-16 RX ADMIN — PRAVASTATIN SODIUM 10 MG: 20 TABLET ORAL at 23:32

## 2020-05-16 RX ADMIN — LACTULOSE 30 G: 20 SOLUTION ORAL at 23:31

## 2020-05-16 RX ADMIN — BRIMONIDINE TARTRATE 1 DROP: 2 SOLUTION OPHTHALMIC at 23:32

## 2020-05-16 RX ADMIN — IPRATROPIUM BROMIDE AND ALBUTEROL SULFATE 1 AMPULE: 2.5; .5 SOLUTION RESPIRATORY (INHALATION) at 19:04

## 2020-05-16 ASSESSMENT — PAIN DESCRIPTION - ONSET: ONSET: GRADUAL

## 2020-05-16 ASSESSMENT — ENCOUNTER SYMPTOMS
SPUTUM PRODUCTION: 0
SHORTNESS OF BREATH: 1
VOMITING: 0

## 2020-05-16 ASSESSMENT — PAIN SCALES - GENERAL
PAINLEVEL_OUTOF10: 5
PAINLEVEL_OUTOF10: 5

## 2020-05-16 ASSESSMENT — PAIN DESCRIPTION - DESCRIPTORS: DESCRIPTORS: HEADACHE

## 2020-05-16 ASSESSMENT — PAIN DESCRIPTION - PAIN TYPE: TYPE: ACUTE PAIN

## 2020-05-16 ASSESSMENT — PAIN DESCRIPTION - LOCATION: LOCATION: HEAD

## 2020-05-16 ASSESSMENT — PAIN - FUNCTIONAL ASSESSMENT: PAIN_FUNCTIONAL_ASSESSMENT: ACTIVITIES ARE NOT PREVENTED

## 2020-05-16 NOTE — ED NOTES
ASSESSMENT:  Pt here with SOB after dialysis tx. Pt dialyzes tu/th/sat and had full tx today. SKIN:  Warm, dry, pink. Cap refill < 2 sec  CARDIAC:  S1 S2 noted. Denies CP. LUNGS: clear upper and lower lobes. Respirations even and unlabored. Wheezes noted bilaterally. ABDOMEN: bowel sounds noted upper and lower quadrants. Soft and tender. EXTREMITIES: bilateral DP and PT. No edema noted. Pt alert and oriented x4. Pupils equal and reactive. No distress noted. Side rails up and call light within reach.        Latoya Paiz RN  05/16/20 [id number]

## 2020-05-17 LAB
GLUCOSE BLD-MCNC: 351 MG/DL (ref 70–99)
HBA1C MFR BLD: 6.8 % (ref 4–6)
PERFORMED ON: ABNORMAL

## 2020-05-17 PROCEDURE — 6370000000 HC RX 637 (ALT 250 FOR IP): Performed by: HOSPITALIST

## 2020-05-17 PROCEDURE — 1210000000 HC MED SURG R&B

## 2020-05-17 PROCEDURE — 82947 ASSAY GLUCOSE BLOOD QUANT: CPT

## 2020-05-17 PROCEDURE — 36415 COLL VENOUS BLD VENIPUNCTURE: CPT

## 2020-05-17 PROCEDURE — 83036 HEMOGLOBIN GLYCOSYLATED A1C: CPT

## 2020-05-17 RX ORDER — INSULIN GLARGINE 100 [IU]/ML
0.25 INJECTION, SOLUTION SUBCUTANEOUS NIGHTLY
Status: DISCONTINUED | OUTPATIENT
Start: 2020-05-17 | End: 2020-05-18 | Stop reason: HOSPADM

## 2020-05-17 RX ORDER — DEXTROSE MONOHYDRATE 50 MG/ML
100 INJECTION, SOLUTION INTRAVENOUS PRN
Status: DISCONTINUED | OUTPATIENT
Start: 2020-05-17 | End: 2020-05-18 | Stop reason: HOSPADM

## 2020-05-17 RX ORDER — DEXTROSE MONOHYDRATE 25 G/50ML
12.5 INJECTION, SOLUTION INTRAVENOUS PRN
Status: DISCONTINUED | OUTPATIENT
Start: 2020-05-17 | End: 2020-05-18 | Stop reason: HOSPADM

## 2020-05-17 RX ORDER — ACETAMINOPHEN 325 MG/1
650 TABLET ORAL EVERY 8 HOURS PRN
Status: DISCONTINUED | OUTPATIENT
Start: 2020-05-17 | End: 2020-05-18 | Stop reason: HOSPADM

## 2020-05-17 RX ORDER — NICOTINE POLACRILEX 4 MG
15 LOZENGE BUCCAL PRN
Status: DISCONTINUED | OUTPATIENT
Start: 2020-05-17 | End: 2020-05-18 | Stop reason: HOSPADM

## 2020-05-17 RX ADMIN — TIMOLOL MALEATE 1 DROP: 5 SOLUTION/ DROPS OPHTHALMIC at 21:39

## 2020-05-17 RX ADMIN — LOSARTAN POTASSIUM 50 MG: 50 TABLET, FILM COATED ORAL at 21:37

## 2020-05-17 RX ADMIN — INSULIN GLARGINE 15 UNITS: 100 INJECTION, SOLUTION SUBCUTANEOUS at 21:40

## 2020-05-17 RX ADMIN — DICYCLOMINE HYDROCHLORIDE 10 MG: 10 CAPSULE ORAL at 11:23

## 2020-05-17 RX ADMIN — ACETAMINOPHEN 650 MG: 325 TABLET, FILM COATED ORAL at 21:38

## 2020-05-17 RX ADMIN — SEVELAMER CARBONATE 800 MG: 800 TABLET, FILM COATED ORAL at 11:24

## 2020-05-17 RX ADMIN — ASPIRIN 81 MG: 81 TABLET, COATED ORAL at 11:23

## 2020-05-17 RX ADMIN — CARVEDILOL 12.5 MG: 12.5 TABLET, FILM COATED ORAL at 11:27

## 2020-05-17 RX ADMIN — INSULIN LISPRO 3 UNITS: 100 INJECTION, SOLUTION INTRAVENOUS; SUBCUTANEOUS at 21:40

## 2020-05-17 RX ADMIN — SERTRALINE HYDROCHLORIDE 25 MG: 50 TABLET ORAL at 11:23

## 2020-05-17 RX ADMIN — ACETAMINOPHEN 650 MG: 325 TABLET, FILM COATED ORAL at 00:57

## 2020-05-17 RX ADMIN — BRIMONIDINE TARTRATE 1 DROP: 2 SOLUTION OPHTHALMIC at 11:24

## 2020-05-17 RX ADMIN — LATANOPROST 1 DROP: 50 SOLUTION OPHTHALMIC at 11:24

## 2020-05-17 RX ADMIN — FLUTICASONE PROPIONATE 2 SPRAY: 50 SPRAY, METERED NASAL at 11:24

## 2020-05-17 RX ADMIN — DORZOLAMIDE HYDROCHLORIDE 1 DROP: 20 SOLUTION/ DROPS OPHTHALMIC at 21:39

## 2020-05-17 RX ADMIN — CETIRIZINE HYDROCHLORIDE 10 MG: 10 TABLET, FILM COATED ORAL at 11:23

## 2020-05-17 RX ADMIN — BRIMONIDINE TARTRATE 1 DROP: 2 SOLUTION OPHTHALMIC at 21:39

## 2020-05-17 RX ADMIN — TIMOLOL MALEATE 1 DROP: 5 SOLUTION/ DROPS OPHTHALMIC at 11:24

## 2020-05-17 RX ADMIN — DICYCLOMINE HYDROCHLORIDE 10 MG: 10 CAPSULE ORAL at 21:36

## 2020-05-17 RX ADMIN — DORZOLAMIDE HYDROCHLORIDE 1 DROP: 20 SOLUTION/ DROPS OPHTHALMIC at 11:24

## 2020-05-17 RX ADMIN — FAMOTIDINE 20 MG: 20 TABLET ORAL at 11:24

## 2020-05-17 RX ADMIN — PRAVASTATIN SODIUM 10 MG: 20 TABLET ORAL at 21:37

## 2020-05-17 RX ADMIN — CARVEDILOL 12.5 MG: 12.5 TABLET, FILM COATED ORAL at 18:05

## 2020-05-17 RX ADMIN — LOSARTAN POTASSIUM 50 MG: 50 TABLET, FILM COATED ORAL at 11:24

## 2020-05-17 RX ADMIN — DICYCLOMINE HYDROCHLORIDE 10 MG: 10 CAPSULE ORAL at 18:05

## 2020-05-17 ASSESSMENT — PAIN DESCRIPTION - DESCRIPTORS
DESCRIPTORS: HEADACHE
DESCRIPTORS: HEADACHE

## 2020-05-17 ASSESSMENT — PAIN - FUNCTIONAL ASSESSMENT
PAIN_FUNCTIONAL_ASSESSMENT: ACTIVITIES ARE NOT PREVENTED
PAIN_FUNCTIONAL_ASSESSMENT: ACTIVITIES ARE NOT PREVENTED

## 2020-05-17 ASSESSMENT — PAIN DESCRIPTION - PAIN TYPE: TYPE: ACUTE PAIN

## 2020-05-17 ASSESSMENT — PAIN SCALES - GENERAL
PAINLEVEL_OUTOF10: 3
PAINLEVEL_OUTOF10: 3

## 2020-05-17 ASSESSMENT — PAIN DESCRIPTION - ONSET
ONSET: GRADUAL
ONSET: GRADUAL

## 2020-05-17 ASSESSMENT — PAIN DESCRIPTION - FREQUENCY: FREQUENCY: INTERMITTENT

## 2020-05-17 ASSESSMENT — PAIN DESCRIPTION - PROGRESSION: CLINICAL_PROGRESSION: GRADUALLY IMPROVING

## 2020-05-17 ASSESSMENT — PAIN DESCRIPTION - LOCATION
LOCATION: HEAD
LOCATION: HEAD

## 2020-05-17 NOTE — PLAN OF CARE
Problem: Falls - Risk of:  Goal: Will remain free from falls  Description: Will remain free from falls  5/17/2020 0121 by Quiana Dai RN  Outcome: Ongoing  5/16/2020 2127 by Quiana Dai RN  Outcome: Ongoing  Goal: Absence of physical injury  Description: Absence of physical injury  5/17/2020 0121 by Quiana Dai RN  Outcome: Ongoing  5/16/2020 2127 by Quiana Dai RN  Outcome: Ongoing     Problem: Pain:  Goal: Pain level will decrease  Description: Pain level will decrease  Outcome: Ongoing  Goal: Control of acute pain  Description: Control of acute pain  Outcome: Ongoing  Goal: Control of chronic pain  Description: Control of chronic pain  Outcome: Ongoing

## 2020-05-17 NOTE — PROGRESS NOTES
Lucy Savage arrived to room # 26   Presented with: FLUID OVERLOAD  Mental Status: Patient is oriented and alert. Vitals:    05/16/20 2132   BP: (!) 168/78   Pulse: 98   Resp: 12   Temp: 97.1 °F (36.2 °C)   SpO2: 98%     Patient safety contract and falls prevention contract reviewed with patient Yes. Oriented Patient to room. Call light within reach. Yes.   Needs, issues or concerns expressed at this time: no.      Electronically signed by Fernando Alberto RN on 5/16/2020 at 9:46 PM

## 2020-05-17 NOTE — PROGRESS NOTES
Progress Note    Date:5/17/2020       Room:0313/313-01  Patient Romaine Akins     YOB: 1960     Age:60 y.o. Subjective   Interval History Status: improved. 68-year-old female with a history of end-stage renal disease on dialysis, type 2 diabetes, hypertension, who presented from dialysis with concerns of shortness of breath. Seen in her room today, stated she has been having frequent diarrhea, noted that patient was placed on lactulose on admission which has now been discontinued. Denied chest pain or worsening shortness of breath, denied nausea, vomiting or abdominal pain. Review of Systems   Review of Systems  12 point system reviewed and negative except as above.     Medications   Scheduled Meds:    insulin glargine  0.25 Units/kg Subcutaneous Nightly    insulin lispro  0-6 Units Subcutaneous TID WC    insulin lispro  0-3 Units Subcutaneous Nightly    dorzolamide  1 drop Left Eye BID    homatropine  1 drop Left Eye Daily    latanoprost  1 drop Left Eye Daily    fluticasone  2 spray Nasal Daily    cetirizine  10 mg Oral Daily    aspirin EC  81 mg Oral Daily    sevelamer  800 mg Oral Daily    sertraline  25 mg Oral Daily    carvedilol  12.5 mg Oral BID WC    losartan  50 mg Oral BID    dicyclomine  10 mg Oral 4x Daily    brimonidine  1 drop Left Eye BID    pravastatin  10 mg Oral Nightly    timolol  1 drop Left Eye BID    famotidine  20 mg Oral Daily     Continuous Infusions:    dextrose       PRN Meds: acetaminophen, glucose, dextrose, glucagon (rDNA), dextrose    Past History    Past Medical History:   has a past medical history of Blind right eye, Blindness of one eye, CKD (chronic kidney disease), stage IV ([address] Diabetes ([address] Diabetes mellitus with ophthalmic manifestations, Glaucoma, Hemod[facility] patient ([facility] [address] [address] Hypertension, Obesity, Renal osteodystrophy, Smoker, and Type II or unspecified type diabetes mellitus with renal manifestations, uncontrolled(250.42). Social History:   reports that she has been smoking. She has a 40.00 pack-year smoking history. She has never used smokeless tobacco. She reports current drug use. Drug: Marijuana. She reports that she does not drink alcohol. Family History:   Family History   Problem Relation Age of Onset    Heart Disease Mother     Heart Attack Mother     Glaucoma Sister     Diabetes Sister     Diabetes Brother     Kidney Disease Brother     Blindness Brother     Stroke Maternal Grandmother     Stroke Maternal Grandfather     Colon Cancer Neg Hx     Colon Polyps Neg Hx     Liver Cancer Neg Hx     Liver Disease Neg Hx     Esophageal Cancer Neg Hx     Rectal Cancer Neg Hx     Stomach Cancer Neg Hx        Physical Examination      Vitals:  BP (!) 151/71   Pulse 84   Temp 97.5 °F (36.4 °C)   Resp 16   Ht 4' 11\" (1.499 m)   Wt 131 lb 6.4 oz (59.6 kg)   SpO2 97%   BMI 26.54 kg/m²   Temp (24hrs), Av.4 °F (36.3 °C), Min:97 °F (36.1 °C), Max:97.9 °F (36.6 °C)      I/O (24Hr): Intake/Output Summary (Last 24 hours) at 2020 1503  Last data filed at 2020 1146  Gross per 24 hour   Intake 500 ml   Output 75 ml   Net 425 ml       Physical Exam  General: No acute distress lying comfortably in bed. HEENT: Atraumatic normocephalic  Cardiac: Normal S1-S2   Respiratory: clear To auscultation bilaterally, no rhonchi or rales  Abdomen: nontender to palpation, no organomegaly noted.   Extremities: no tenderness, trace LE edema, moves all extremities  Psych: Affect normal and good eye contact      Labs/Imaging/Diagnostics   Labs:  CBC:  Recent Labs     20   WBC 3.5*   RBC 3.65*   HGB 10.2*   HCT 33.6*   MCV 92.1   RDW 19.1*        CHEMISTRIES:  Recent Labs     20  1905     --    K 3.5 3.6   CL 90*  --    CO2 35*  --    BUN 9  --    CREATININE 2.1*  --    GLUCOSE 146*  --      PT/INR:No results for input(s): PROTIME, INR in the last 72 hours.  APTT:No results for input(s): APTT in the last 72 hours. LIVER PROFILE:  Recent Labs     05/16/20  1825   AST 18   ALT 19   BILITOT 0.5   ALKPHOS 201*       Imaging Last 24 Hours:  Xr Chest Portable    Result Date: 5/16/2020  XR CHEST PORTABLE 5/16/2020 6:56 PM History: Short of breath. Portable chest x-ray compared with 5/9/2020. Heart size is within normal limits and unchanged. Normal mediastinum. Perihilar vascular prominence and increased diffuse interstitial disease compatible with early CHF. 1. Perihilar vascular prominence and interstitial disease compatible with heart failure. 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 Lantus 15 units at night with sliding scale meal coverage. Hypoglycemia protocol in place  Continue eyedrops  Continue statin, ASA    Hypertension: Continue losartan, coreg    Depression: Continue sertraline.     GERD: continue pepcid        Electronically signed by John Garcia MD on 5/17/20 at 3:03 PM CDT

## 2020-05-17 NOTE — PROGRESS NOTES
Pharmacy Renal Adjustment    Amaya Beltrán is a 56 y.o. female. Pharmacy has renally adjusted Pepcid per protocol. Recent Labs     05/16/20 1825   BUN 9       Recent Labs     05/16/20 1825   CREATININE 2.1*       CrCl cannot be calculated (Unknown ideal weight.).     Height:   Ht Readings from Last 1 Encounters:   05/16/20 4' 11\" (1.499 m)     Weight:  Wt Readings from Last 1 Encounters:   05/16/20 131 lb 6.4 oz (59.6 kg)       CKD stage: ESRD      Plan: Adjust Pepcid from 20 mg po bid to 20 mg po daily    Electronically signed by Bharati Braun RPh, [place], 5/16/2020,10:35 PM

## 2020-05-17 NOTE — PROGRESS NOTES
The pt has concerns about not having oxygen at home. She states \"I cannot go very far without having to stop from getting short of breath. I can make it to the bathroom but that is hard on my breathing. \" Will message hospitalist regarding home O2 eval prior to discharge.

## 2020-05-17 NOTE — H&P
[facility] Medicine  History and Physical    Patient:  Gala Buerger  MRN: [id number]    CHIEF COMPLAINT:  SOB    History Obtained From: Patient  Family present for interview:     PCP: RAMONA Koenig    HISTORY OF PRESENT ILLNESS:  56 y.o. female who presents with SOB during dialysis. Patient did not complete last dialysis treatment. Nephrology requested admission to the medicine service for further evaluation. Patient was placed on supplemental oxygen and her symptoms improved. REVIEW OF SYSTEMS:  Review of Systems  Denies fever, chills, nausea, vomiting. All other 14 review of systems negative except as noted above.     Past Medical History:      Diagnosis Date    Blind right eye     partial vision loss in the L eye too, due to DM    Blindness of one eye     Right     CKD (chronic kidney disease), stage IV (HCC)     secondary to diabetic nephropathy    Diabetes ([facility] [address]     Diabetes mellitus with ophthalmic manifestations     dx'd in 2000 but likely ongoing before that, was dx'd when she went blind in the R eye    Glaucoma     Hemodialysis patient ([facility] [address]     dialysis tues, thurs, sat. [facility]    Hypertension     2000, dx'd at same time as the DM    Obesity     Renal osteodystrophy     Smoker     Type II or unspecified type diabetes mellitus with renal manifestations, uncontrolled(250.42)        Past Surgical History:      Procedure Laterality Date    CARPAL TUNNEL RELEASE      CATARACT REMOVAL     [address] [place], and 1979    CHOLECYSTECTOMY      COLONOSCOPY N/A 3/9/2018    Dr ReevesLfsueb-Tvzntcmhivpdly-Mrqdpzwzlfjbv AP (-) dysplasia x 1, tubular AP x  (-) dysplasia x 1, HP x 4--1 yr recall    DIALYSIS FISTULA CREATION Left 4/23/15 [facility]    Left proximal ulnar artery to cephalic vein AVF creation    EYE SURGERY      laser, several times     TYMPANOSTOMY TUBE PLACEMENT Bilateral     VASCULAR SURGERY Left 5/11/15 [facility]    US-guided 10/4/19 5/7/20  RAMONA Roa   lactulose (CHRONULAC) 10 GM/15ML solution TAKE 30 ML BY MOUTH THREE TIMES DAILY 9/25/19   RAMONA Roa   ipratropium-albuterol (DUONEB) 0.5-2.5 (3) MG/3ML SOLN nebulizer solution Inhale 3 mLs into the lungs every 4 hours 9/10/19   RAMONA Roa   famotidine (PEPCID) 20 MG tablet TAKE 1 TABLET BY MOUTH TWICE DAILY 7/2/19   Jagdeep Sen MD   pravastatin (PRAVACHOL) 10 MG tablet TAKE 1 TABLET BY MOUTH EVERY NIGHT AT BEDTIME 7/2/19 8/1/19  Jagdeep Sen MD   Cholecalciferol (VITAMIN D3) 1000 units CAPS Take by mouth    Historical Provider, MD   blood glucose monitor kit and supplies 1 kit by Other route 3 times daily Test three times a day & as needed for symptoms of irregular blood glucose. 7/11/18 3/6/19  Skip RAMONA Shelby   Travoprost, BAK Free, (TRAVATAN Z) 0.004 % SOLN ophthalmic solution 1 drop nightly    Historical Provider, MD   midodrine (PROAMATINE) 5 MG tablet TK 1 T PO HALF WAY THROUGH TREATMENT AS DIRECTED 10/17/17   Historical Provider, MD   sevelamer (RENVELA) 800 MG tablet Take 1 tablet by mouth daily     Historical Provider, MD   aspirin EC 81 MG EC tablet Take 81 mg by mouth daily    Historical Provider, MD   FREESTYLE LITE strip USE TO TEST BLOOD SUGAR TWICE DAILY 12/29/16   Jagdeep Sen MD   Cetirizine HCl (ZYRTEC ALLERGY PO) Take 10 mg by mouth daily     Historical Provider, MD   fluticasone (FLONASE) 50 MCG/ACT nasal spray 2 sprays by Nasal route daily. To keep ears opened up 3/27/14   James Esparza MD   Blood Glucose Monitoring Suppl NIKOS by Does not apply route. Pt will also need new lancet device if not included in kit. 12/9/13   James Esparza MD   Glucose Blood (BLOOD GLUCOSE TEST STRIPS) STRP Check blood sugar twice a day for recent medication adjustments. 12/9/13   James Esparza MD   bimatoprost 0.01 % SOLN Apply 1 drop to eye nightly.  One drop in left eye at hs    Historical Provider, MD   Insulin Syringe-Needle U-100 (ACCUSURE INS SYR 1CC/31GX5/16\") 31G X 5/16\" 1 ML MISC by Does not apply route. 9/25/12   Maco Minaya MD   timolol (TIMOPTIC-XR) 0.5 % ophthalmic gel-forming Place 1 drop into the left eye 2 times daily  7/3/12   Historical Provider, MD   dorzolamide (TRUSOPT) 2 % ophthalmic solution Place 1 drop into the left eye 2 times daily     Historical Provider, MD   brimonidine (ALPHAGAN P) 0.15 % ophthalmic solution Place 1 drop into the left eye 3 times daily     Historical Provider, MD   homatropine 5 % ophthalmic solution Place 1 drop into the left eye daily     Historical Provider, MD       Allergies:  Bupropion; Sulfa antibiotics; Varenicline; Wellbutrin [bupropion hcl]; Azithromycin; and Penicillins    Social History:   TOBACCO:   reports that she has been smoking. She has a 40.00 pack-year smoking history. She has never used smokeless tobacco.  ETOH:   reports no history of alcohol use. Social History     Substance and Sexual Activity   Drug Use Yes    Types: Marijuana       Family History:       Problem Relation Age of Onset    Heart Disease Mother     Heart Attack Mother     Glaucoma Sister     Diabetes Sister     Diabetes Brother     Kidney Disease Brother     Blindness Brother     Stroke Maternal Grandmother     Stroke Maternal Grandfather     Colon Cancer Neg Hx     Colon Polyps Neg Hx     Liver Cancer Neg Hx     Liver Disease Neg Hx     Esophageal Cancer Neg Hx     Rectal Cancer Neg Hx     Stomach Cancer Neg Hx            Physical Exam:    Vitals: BP (!) 172/76   Pulse 97   Temp 97 °F (36.1 °C)   Resp 20   Ht 4' 11\" (1.499 m)   Wt 131 lb (59.4 kg)   SpO2 97%   BMI 26.46 kg/m²   24HR INTAKE/OUTPUT:  No intake or output data in the 24 hours ending 05/16/20 2022    General: Alert and cooperative with exam. Normal body habitus. Appears older than stated age and unhealthy. HEENT: Atraumatic normocephalic.   Teeth and gums normal.  Eyes with clear conjunctiva and normal lids, pupils and irises normal, external ears and nose are normal, lips normal, no cyanosis. Neck without masses, lympadenopathy, bruit, thyroid normal.  Normal JVP. No hepatojugular reflux. Respiratory: Inspection of chest normal.  No use of accessory muscles, normal diaphragmatic movements. Lungs clear to auscultation bilaterally without rales, rhonchi or wheezes. Cardiac: Regular rate and rhythm, S1, S2 normal, no murmur, click, no gallop or rubs. No reproducible chest pain on palpation of the chest wall. Vascular: 1+ peripheral pulses with no cyanosis. Abdomen: Soft, non-tender; no peritoneal signs, bowel sounds normal; no masses,  no organomegaly. Musculoskeletal: Normal joints on inspection. No clubbing, no cyanosis or edema. Neurologic: Awake alert and oriented x3, affect and mood appropriate. Speech is fluent. Intact remote and recent memory. No focal neurologic deficits, CN II-XII intact. Psychiatric: Does not seem depressed, anxious or agitated. Denies suicidal or homicidal ideation. Skin: No rashes, nodules. Lymphatic: No lymphadenopathy. Hematologic: No bruises, petechiae.     CBC:   Recent Labs     05/16/20 1825   WBC 3.5*   HGB 10.2*        BMP:    Recent Labs     05/16/20 1825 05/16/20 1905     --    K 3.5 3.6   CL 90*  --    CO2 35*  --    BUN 9  --    CREATININE 2.1*  --    GLUCOSE 146*  --      CMP:   Recent Labs     05/16/20 1825 05/16/20 1905     --    K 3.5 3.6   CL 90*  --    CO2 35*  --    BUN 9  --    CREATININE 2.1*  --    GLUCOSE 146*  --    CALCIUM 9.4  --    BILITOT 0.5  --    ALKPHOS 201*  --    AST 18  --    ALT 19  --      Hepatic:   Recent Labs     05/16/20 1825   AST 18   ALT 19   BILITOT 0.5   ALKPHOS 201*       Lactid Acid:    Recent Labs     05/16/20 1825   LACTA 0.9     Procalcitonin:     Troponin T:   Recent Labs     05/16/20 1825 05/16/20  1950   TROPONINI 0.05* 0.05*     Pro-BNP: No results for input(s): BNP in the last 72 hours. Lipids: No results for input(s): CHOL, HDL in the last 72 hours. Invalid input(s): LDLCALCU  INR: No results for input(s): INR in the last 72 hours. A1c:Invalid input(s): HEMOGLOBIN A1C    SEPSIS Criteria:   Temp: Temp  Av °F (36.1 °C)  Min: 97 °F (36.1 °C)  Max: 97 °F (36.1 °C)    HR Range: Pulse  Av.8  Min: 97  Max: 110   RR Range: Resp  Av.6  Min: 20  Max: 22   WBC:   Recent Labs     20   WBC 3.5*      Lactic acid:   Recent Labs     20   LACTA 0.9      Creatinine:   Recent Labs     20   CREATININE 2.1*      Troponin:   Recent Labs     20  1950   TROPONINI 0.05*       LFTs:   Recent Labs     20   AST 18   ALT 19   BILITOT 0.5   ALKPHOS 201*       ABGs:   Recent Labs     20   PHART 7.510*   LNO0NIM 45.0   PO2ART 51.0*   CHX4JJJ 35.9*   BEART 11.6*   HGBAE 10.1*   E9SVQPBU 87.3*   CARBOXHGBART 2.4   02THERAPY Unknown     Urine drug screen: No results for input(s): AMPHSUR, BARBSCNU, LABBENZ, LABMETH, OPIAU, PHENCYCU, PPXUR, OXTCOSU, METAMPU, MDMA, BUPRENUR in the last 72 hours. Invalid input(s): COCAINEMETABOLITE, URINE    Cultures:   No results for input(s): BC in the last 72 hours. No results for input(s): Susen Lyman in the last 72 hours. No results for input(s): CULTRESP in the last 72 hours. No results for input(s): LABURIN in the last 72 hours. -----------------------------------------------------------------    Imaging Studies:    XR CHEST PORTABLE   Final Result   1. Perihilar vascular prominence and interstitial disease compatible   with heart failure.    Signed by Dr Devyn Juarez on 2020 6:56 PM            Assessment     Principal Problem:    Volume overload  Active Problems:    SOB (shortness of breath)    Diabetes mellitus with ophthalmic complication (HCC)    ESRD (end stage renal disease) on dialysis (HCC)    CKD (chronic kidney disease) stage V requiring chronic dialysis ([facility] [address]  Resolved Problems:    * No resolved hospital problems. *      Plan     --SOB. Differential dx includes HF, volume overload, intrinsic pulmonary disease. I have ordered and echocardiogram for the morning to evaluate LVEF.    --Volume overload. Consult nephrology for hemodialysis.    --DM. Start SSI and FSG qAC and qHS. -- DVT prophylaxis: SQ heparin      -- CODE STATUS: Full    Total face-to-face time with this patient, time spent reviewing medical records and in coordination of care with the emergency department physician, nursing staff was 75 minutes.     Signed:  Cheryle Kaufman MD 5/16/2020 8:22 PM  Admitting Hospitalist

## 2020-05-17 NOTE — ED PROVIDER NOTES
[facility] EMERGENCY DEPT  eMERGENCY dEPARTMENT eNCOUnter      Pt Name: Rodney Rendon  MRN: [id number]  [place] 1960  Date of evaluation: 5/16/2020  Provider: Jerson Black, [address]       Chief Complaint   Patient presents with    Shortness of Breath     pt sent by dialysis and wawnt able to complete treatment         HISTORY OF PRESENT ILLNESS   (Location/Symptom, Timing/Onset,Context/Setting, Quality, Duration, Modifying Factors, Severity)  Note limiting factors. Rodney Rendon is a 56 y.o. female who presents to the emergency department [facility]. Patient denies any chest pain. She states she was able to complete the treatment. She denies missing any dialysis treatments. Oxygen was put on her and she was sent to ER. She did have an admission May 7 with similar symptoms. she has been a dialysis patient for 5 years. She denies any cardiac history. She denies a fever. She has had a nonproductive cough. She has recently been trying to quit smoking. The history is provided by the patient. Shortness of Breath   Severity:  Moderate  Onset quality:  Sudden  Timing:  Constant  Context comment:  Dialysis  Relieved by:  Oxygen  Associated symptoms: no chest pain, no fever, no sputum production and no vomiting    Risk factors: tobacco use        NursingNotes were reviewed. REVIEW OF SYSTEMS    (2-9 systems for level 4, 10 or more for level 5)     Review of Systems   Constitutional: Negative for fever. Respiratory: Positive for shortness of breath. Negative for sputum production. Cardiovascular: Negative for chest pain. Gastrointestinal: Negative for vomiting. Except as noted above the remainder of the review of systems was reviewed and negative.        PAST MEDICAL HISTORY     Past Medical History:   Diagnosis Date    Blind right eye     partial vision loss in the L eye too, due to DM    Blindness of one eye     Right     CKD (chronic ARSENIO    VASCULAR SURGERY  05/15/2017    S. Left upper fistulograms/venograms.  VASCULAR SURGERY  02/14/2018    [facility]. Left upper fistulograms, left subclavian BA 12x40 atlas, left cephalic arch BA 07U10 conquest.    VASCULAR SURGERY  02/13/2019    [facility]. Left upper fistulograms,BA left SCV 8x40 conquest,stent left SCV, viabahn 13x50,left SCV stent BA 12x40 conquest.         CURRENT MEDICATIONS       Previous Medications    ASPIRIN EC 81 MG EC TABLET    Take 81 mg by mouth daily    B-D ULTRAFINE III SHORT PEN 31G X 8 MM MISC    USE ONCE DAILY TO INJECT INSULIN DAILY    BIMATOPROST 0.01 % SOLN    Apply 1 drop to eye nightly. One drop in left eye at     BLOOD GLUCOSE MONITOR KIT AND SUPPLIES    1 kit by Other route 3 times daily Test three times a day & as needed for symptoms of irregular blood glucose. BLOOD GLUCOSE MONITORING SUPPL NIKOS    by Does not apply route. Pt will also need new lancet device if not included in kit. BRIMONIDINE (ALPHAGAN P) 0.15 % OPHTHALMIC SOLUTION    Place 1 drop into the left eye 3 times daily     CARVEDILOL (COREG) 12.5 MG TABLET    Take 12.5 mg by mouth 2 times daily (with meals)    CETIRIZINE HCL (ZYRTEC ALLERGY PO)    Take 10 mg by mouth daily     CHOLECALCIFEROL (VITAMIN D3) 1000 UNITS CAPS    Take by mouth    DICYCLOMINE (BENTYL) 10 MG CAPSULE    Take 1 capsule by mouth 4 times daily    DORZOLAMIDE (TRUSOPT) 2 % OPHTHALMIC SOLUTION    Place 1 drop into the left eye 2 times daily     FAMOTIDINE (PEPCID) 20 MG TABLET    TAKE 1 TABLET BY MOUTH TWICE DAILY    FLUTICASONE (FLONASE) 50 MCG/ACT NASAL SPRAY    2 sprays by Nasal route daily. To keep ears opened up    FREESTYLE LITE STRIP    USE TO TEST BLOOD SUGAR TWICE DAILY    GLUCOSE BLOOD (BLOOD GLUCOSE TEST STRIPS) STRP    Check blood sugar twice a day for recent medication adjustments.     HOMATROPINE 5 % OPHTHALMIC SOLUTION    Place 1 drop into the left eye daily     INSULIN GLARGINE (LANTUS SOLOSTAR) 100 UNIT/ML INJECTION PEN INJECT 10 UNITS INTO THE SKIN ONCE DAILY    INSULIN SYRINGE-NEEDLE U-100 (ACCUSURE INS SYR 1CC/31GX5/16\") 31G X 5/16\" 1 ML MISC    by Does not apply route. IPRATROPIUM-ALBUTEROL (DUONEB) 0.5-2.5 (3) MG/3ML SOLN NEBULIZER SOLUTION    Inhale 3 mLs into the lungs every 4 hours    LACTULOSE (CHRONULAC) 10 GM/15ML SOLUTION    TAKE 30 ML BY MOUTH THREE TIMES DAILY    LOSARTAN (COZAAR) 50 MG TABLET    TAKE 1 TABLET BY MOUTH TWICE DAILY    MIDODRINE (PROAMATINE) 5 MG TABLET    TK 1 T PO HALF WAY THROUGH TREATMENT AS DIRECTED    PRAVASTATIN (PRAVACHOL) 10 MG TABLET    TAKE 1 TABLET BY MOUTH EVERY NIGHT AT BEDTIME    SERTRALINE (ZOLOFT) 25 MG TABLET    Take 1 tablet by mouth daily    SEVELAMER (RENVELA) 800 MG TABLET    Take 1 tablet by mouth daily     TIMOLOL (TIMOPTIC-XR) 0.5 % OPHTHALMIC GEL-FORMING    Place 1 drop into the left eye 2 times daily     TRAVOPROST, ALLISON FREE, (TRAVATAN Z) 0.004 % SOLN OPHTHALMIC SOLUTION    1 drop nightly       ALLERGIES     Bupropion; Sulfa antibiotics; Varenicline; Wellbutrin [bupropion hcl];  Azithromycin; and Penicillins    FAMILY HISTORY       Family History   Problem Relation Age of Onset    Heart Disease Mother     Heart Attack Mother     Glaucoma Sister     Diabetes Sister     Diabetes Brother     Kidney Disease Brother     Blindness Brother     Stroke Maternal Grandmother     Stroke Maternal Grandfather     Colon Cancer Neg Hx     Colon Polyps Neg Hx     Liver Cancer Neg Hx     Liver Disease Neg Hx     Esophageal Cancer Neg Hx     Rectal Cancer Neg Hx     Stomach Cancer Neg Hx           SOCIAL HISTORY       Social History     Socioeconomic History    Marital status:      Spouse name: None    Number of children: None    Years of education: None    Highest education level: None   Occupational History    None   Social Needs    Financial resource strain: None    Food insecurity     Worry: None     Inability: None    Transportation needs     Medical: None Non-medical: None   Tobacco Use    Smoking status: Current Every Day Smoker     Packs/day: 1.00     Years: 40.00     Pack years: 40.00    Smokeless tobacco: Never Used   Substance and Sexual Activity    Alcohol use: No     Alcohol/week: 0.0 standard drinks    Drug use: Yes     Types: Marijuana    Sexual activity: None   Lifestyle    Physical activity     Days per week: None     Minutes per session: None    Stress: None   Relationships    Social connections     Talks on phone: None     Gets together: None     Attends Druze service: None     Active member of club or organization: None     Attends meetings of clubs or organizations: None     Relationship status: None    Intimate partner violence     Fear of current or ex partner: None     Emotionally abused: None     Physically abused: None     Forced sexual activity: None   Other Topics Concern    None   Social History Narrative    None       SCREENINGS      @FLOW([id number])@      PHYSICAL EXAM    (up to 7 for level 4, 8 or more for level 5)     ED Triage Vitals [05/16/20 1723]   BP Temp Temp src Pulse Resp SpO2 Height Weight   (!) 178/87 97 °F (36.1 °C) -- 110 22 93 % 4' 11\" (1.499 m) 131 lb (59.4 kg)       Physical Exam  Vitals signs and nursing note reviewed. Constitutional:       Appearance: She is well-developed. HENT:      Head: Normocephalic and atraumatic. Eyes:      General: No scleral icterus. Right eye: No discharge. Left eye: No discharge. Neck:      Musculoskeletal: Normal range of motion and neck supple. Cardiovascular:      Rate and Rhythm: Tachycardia present. Heart sounds: Murmur present. Pulmonary:      Effort: No respiratory distress. Breath sounds: Rhonchi and rales present. Neurological:      Mental Status: She is alert and oriented to person, place, and time.    Psychiatric:         Behavior: Behavior normal.         DIAGNOSTIC RESULTS     EKG: All EKG's are interpreted by the Emergency 87.3 (*)     All other components within normal limits    Narrative:     CALL  Heck  KLED tel. ,  JOY Rabago RN ER, 05/16/2020 19:08, by CELY PEREZ [facility]   TROPONIN - Abnormal; Notable for the following components:    Troponin 0.05 (*)     All other components within normal limits   CULTURE, BLOOD 1   CULTURE, BLOOD 2   LACTIC ACID, PLASMA   POTASSIUM, WHOLE BLOOD       All other labs were within normal range or not returned as of this dictation. EMERGENCY DEPARTMENT COURSE and DIFFERENTIALDIAGNOSIS/MDM:   Vitals:    Vitals:    05/16/20 1827 05/16/20 1908 05/16/20 1944 05/16/20 2020   BP: (!) 154/76  (!) 182/76 (!) 172/76   Pulse: 98  98 97   Resp: 20 21 20 20   Temp:       SpO2: 92% 100% 98% 97%   Weight:       Height:               MDM  Spoke to Haile Gillis with nephrology who wanted pt admitted to hospitalist and they would see her. Dr Nuria Rojas accepted pt for admission      CONSULTS:  [address]:  Unless otherwise noted below, none     Procedures    FINAL IMPRESSION      1. Shortness of breath    2. Other hypervolemia    3.  ESRD (end stage renal disease) on dialysis [facility])        DISPOSITION/PLAN   DISPOSITION Admitted 05/16/2020 08:22:06 PM      PATIENT REFERRED TO:  RAMONA Lee  [facility]  [place] [address] [facility] [address] [facility], [address] [place] [address] [phone]            DISCHARGE MEDICATIONS:  New Prescriptions    No medications on file          (Please note that portions of this note were completed with a voice recognitionprogram.  Efforts were made to edit the dictations but occasionally words are mis-transcribed.)    RAMONA Mendoza (electronically signed)          RAMONA Mendoza  05/17/20 1613

## 2020-05-18 ENCOUNTER — TELEPHONE (OUTPATIENT)
Dept: PRIMARY CARE CLINIC | Age: 60
End: 2020-05-18

## 2020-05-18 VITALS
BODY MASS INDEX: 27.86 KG/M2 | DIASTOLIC BLOOD PRESSURE: 72 MMHG | SYSTOLIC BLOOD PRESSURE: 140 MMHG | RESPIRATION RATE: 20 BRPM | HEART RATE: 80 BPM | TEMPERATURE: 96.6 F | WEIGHT: 138.2 LBS | OXYGEN SATURATION: 90 % | HEIGHT: 59 IN

## 2020-05-18 LAB
EKG P AXIS: 67 DEGREES
EKG P AXIS: 70 DEGREES
EKG P-R INTERVAL: 142 MS
EKG P-R INTERVAL: 144 MS
EKG Q-T INTERVAL: 350 MS
EKG Q-T INTERVAL: 356 MS
EKG QRS DURATION: 76 MS
EKG QRS DURATION: 78 MS
EKG QTC CALCULATION (BAZETT): 414 MS
EKG QTC CALCULATION (BAZETT): 419 MS
EKG T AXIS: -4 DEGREES
EKG T AXIS: 19 DEGREES
GLUCOSE BLD-MCNC: 88 MG/DL (ref 70–99)
GLUCOSE BLD-MCNC: 90 MG/DL (ref 70–99)
PERFORMED ON: NORMAL
PERFORMED ON: NORMAL

## 2020-05-18 PROCEDURE — 82947 ASSAY GLUCOSE BLOOD QUANT: CPT

## 2020-05-18 PROCEDURE — 94761 N-INVAS EAR/PLS OXIMETRY MLT: CPT

## 2020-05-18 PROCEDURE — 6370000000 HC RX 637 (ALT 250 FOR IP): Performed by: HOSPITALIST

## 2020-05-18 PROCEDURE — 93010 ELECTROCARDIOGRAM REPORT: CPT | Performed by: INTERNAL MEDICINE

## 2020-05-18 RX ADMIN — TIMOLOL MALEATE 1 DROP: 5 SOLUTION/ DROPS OPHTHALMIC at 08:49

## 2020-05-18 RX ADMIN — DICYCLOMINE HYDROCHLORIDE 10 MG: 10 CAPSULE ORAL at 08:48

## 2020-05-18 RX ADMIN — SEVELAMER CARBONATE 800 MG: 800 TABLET, FILM COATED ORAL at 08:51

## 2020-05-18 RX ADMIN — DORZOLAMIDE HYDROCHLORIDE 1 DROP: 20 SOLUTION/ DROPS OPHTHALMIC at 08:49

## 2020-05-18 RX ADMIN — BRIMONIDINE TARTRATE 1 DROP: 2 SOLUTION OPHTHALMIC at 08:49

## 2020-05-18 RX ADMIN — LATANOPROST 1 DROP: 50 SOLUTION OPHTHALMIC at 08:49

## 2020-05-18 RX ADMIN — CETIRIZINE HYDROCHLORIDE 10 MG: 10 TABLET, FILM COATED ORAL at 08:48

## 2020-05-18 RX ADMIN — CARVEDILOL 12.5 MG: 12.5 TABLET, FILM COATED ORAL at 08:48

## 2020-05-18 RX ADMIN — LOSARTAN POTASSIUM 50 MG: 50 TABLET, FILM COATED ORAL at 08:48

## 2020-05-18 RX ADMIN — SERTRALINE HYDROCHLORIDE 25 MG: 50 TABLET ORAL at 08:48

## 2020-05-18 RX ADMIN — FAMOTIDINE 20 MG: 20 TABLET ORAL at 08:48

## 2020-05-18 RX ADMIN — FLUTICASONE PROPIONATE 2 SPRAY: 50 SPRAY, METERED NASAL at 08:49

## 2020-05-18 RX ADMIN — ASPIRIN 81 MG: 81 TABLET, COATED ORAL at 08:48

## 2020-05-18 ASSESSMENT — PAIN SCALES - GENERAL: PAINLEVEL_OUTOF10: 0

## 2020-05-18 NOTE — DISCHARGE SUMMARY
Discharge Summary      Date:5/18/2020        Patient Heriberto Cardona     YOB: 1960     Age:60 y.o. Admit Date:5/16/2020   Admission Condition:fair   Discharged Condition:stable  Discharge Date: 05/18/20       Discharge Diagnoses   Principal Problem:    Volume overload  Active Problems:    Diabetes mellitus with ophthalmic complication (HCC)    ESRD (end stage renal disease) on dialysis (HCC)    CKD (chronic kidney disease) stage V requiring chronic dialysis (HCC)    SOB (shortness of breath)  Resolved Problems:    * No resolved hospital problems. [facility] Stay   Narrative of Hospital Course:     75-year-old female with a history of end-stage renal disease on dialysis, type 2 diabetes, hypertension, who presented from dialysis with concerns of shortness of breath. Patient was discharged 9 days ago prior to this admission for similar complaints. Patient SOB resolved even before admitted to the floors. Patient insisted on getting oxygen on discharge that she couldn't breath though she was sating > 93%. Home oxygen eval was completed and patient did not require oxygen. Was discharged home to resume normal outpt run of dialysis. PCP need to further work up possible intrinsic lung disease and need for oxygen could also be a psychological component. Physical Exam  General: No acute distress lying comfortably in bed. HEENT: Atraumatic normocephalic  Cardiac: Normal S1-S2   Respiratory: clear To auscultation bilaterally, no rhonchi or rales  Abdomen: nontender to palpation, no organomegaly noted. Extremities: no tenderness, trace LE edema, moves all extremities  Psych: Affect normal and good eye contact      Consultants:   Clay Carlos xiang    Time Spent on Discharge:  < 30 minutes were spent in patient examination, evaluation, counseling as well as medication reconciliation, prescriptions for required medications, discharge plan and follow up.       Surgeries/Procedures Performed:  NONE      Significant Diagnostic Studies:   Recent Labs:  CBC:   Lab Results   Component Value Date    WBC 3.5 05/16/2020    RBC 3.65 05/16/2020    HGB 10.2 05/16/2020    HCT 33.6 05/16/2020    MCV 92.1 05/16/2020    MCH 27.9 05/16/2020    MCHC 30.4 05/16/2020    RDW 19.1 05/16/2020     05/16/2020     BMP:    Lab Results   Component Value Date    GLUCOSE 146 05/16/2020     05/16/2020    K 3.6 05/16/2020    K 3.5 05/16/2020    K 3.8 05/08/2020    CL 90 05/16/2020    CO2 35 05/16/2020    ANIONGAP 12 05/16/2020    BUN 9 05/16/2020    CREATININE 2.1 05/16/2020    CALCIUM 9.4 05/16/2020    LABGLOM 24 05/16/2020       Radiology Last 7 Days:  Xr Chest Portable    Result Date: 5/16/2020  1. Perihilar vascular prominence and interstitial disease compatible with heart failure. Signed by Dr Pipo Decker on 5/16/2020 6:56 PM      Discharge Plan   Disposition: Home    Provider Follow-Up:   Penelope Calderon, APRN  [address] [phone]    On 5/22/2020  Hospital follow up on 5/22/20 @ 2:45 p.m. ( this may be a tele help call )         Patient Instructions   Diet: diabetic diet and renal diet    Activity: activity as tolerated      Discharge Medications         Medication List      CONTINUE taking these medications    aspirin EC 81 MG EC tablet     B-D ULTRAFINE III SHORT PEN 31G X 8 MM Misc  Generic drug:  Insulin Pen Needle  USE ONCE DAILY TO INJECT INSULIN DAILY     bimatoprost 0.01 % Soln ophthalmic drops  Commonly known as:  LUMIGAN     * blood glucose monitor kit and supplies  by Does not apply route. Pt will also need new lancet device if not included in kit. * blood glucose monitor kit and supplies  1 kit by Other route 3 times daily Test three times a day & as needed for symptoms of irregular blood glucose. * blood glucose test strips  Check blood sugar twice a day for recent medication adjustments.      * FREESTYLE LITE strip  Generic drug: blood glucose test strips  USE TO TEST BLOOD SUGAR TWICE DAILY     brimonidine 0.15 % ophthalmic solution  Commonly known as:  ALPHAGAN P     carvedilol 12.5 MG tablet  Commonly known as:  COREG     dicyclomine 10 MG capsule  Commonly known as:  BENTYL  Take 1 capsule by mouth 4 times daily     dorzolamide 2 % ophthalmic solution  Commonly known as:  TRUSOPT     famotidine 20 MG tablet  Commonly known as:  PEPCID  TAKE 1 TABLET BY MOUTH TWICE DAILY     fluticasone 50 MCG/ACT nasal spray  Commonly known as:  Flonase  2 sprays by Nasal route daily. To keep ears opened up     homatropine 5 % ophthalmic solution     insulin glargine 100 UNIT/ML injection pen  Commonly known as:  Lantus SoloStar  INJECT 10 UNITS INTO THE SKIN ONCE DAILY     Insulin Syringe-Needle U-100 31G X 5/16\" 1 ML Misc  Commonly known as:  ACCUSURE INS SYR 1CC/31GX5/16\"  by Does not apply route. ipratropium-albuterol 0.5-2.5 (3) MG/3ML Soln nebulizer solution  Commonly known as:  DUONEB  Inhale 3 mLs into the lungs every 4 hours     lactulose 10 GM/15ML solution  Commonly known as:  CHRONULAC  TAKE 30 ML BY MOUTH THREE TIMES DAILY     losartan 50 MG tablet  Commonly known as:  COZAAR  TAKE 1 TABLET BY MOUTH TWICE DAILY     midodrine 5 MG tablet  Commonly known as:  PROAMATINE     pravastatin 10 MG tablet  Commonly known as:  PRAVACHOL  TAKE 1 TABLET BY MOUTH EVERY NIGHT AT BEDTIME     sertraline 25 MG tablet  Commonly known as:  ZOLOFT  Take 1 tablet by mouth daily     sevelamer 800 MG tablet  Commonly known as:  RENVELA     timolol 0.5 % ophthalmic gel-forming  Commonly known as:  TIMOPTIC-XE     Travatan Z 0.004 % Soln ophthalmic solution  Generic drug:  Travoprost (ALLISON Free)     Vitamin D3 25 MCG (1000 UT) Caps     ZYRTEC ALLERGY PO         * This list has 4 medication(s) that are the same as other medications prescribed for you. Read the directions carefully, and ask your doctor or other care provider to review them with you.

## 2020-05-18 NOTE — PLAN OF CARE
Problem: Falls - Risk of:  Goal: Will remain free from falls  Description: Will remain free from falls  Outcome: Ongoing  Goal: Absence of physical injury  Description: Absence of physical injury  Outcome: Ongoing     Problem: Pain:  Goal: Pain level will decrease  Description: Pain level will decrease  Outcome: Ongoing  Goal: Control of acute pain  Description: Control of acute pain  Outcome: Ongoing  Goal: Control of chronic pain  Description: Control of chronic pain  Outcome: Ongoing     Problem:  Activity:  Goal: Fatigue will decrease  Description: Fatigue will decrease  Outcome: Ongoing  Goal: Risk for activity intolerance will decrease  Description: Risk for activity intolerance will decrease  Outcome: Ongoing     Problem: Fluid Volume:  Goal: Will show no signs or symptoms of fluid imbalance  Description: Will show no signs or symptoms of fluid imbalance  Outcome: Ongoing     Problem: Nutritional:  Goal: Ability to identify appropriate dietary choices will improve  Description: Ability to identify appropriate dietary choices will improve  Outcome: Ongoing     Problem: Physical Regulation:  Goal: Ability to maintain clinical measurements within normal limits will improve  Description: Ability to maintain clinical measurements within normal limits will improve  Outcome: Ongoing  Goal: Complications related to the disease process, condition or treatment will be avoided or minimized  Description: Complications related to the disease process, condition or treatment will be avoided or minimized  Outcome: Ongoing     Problem: Skin Integrity:  Goal: Status of oral mucous membranes will improve  Description: Status of oral mucous membranes will improve  Outcome: Ongoing  Goal: Skin integrity will be maintained  Description: Skin integrity will be maintained  Outcome: Ongoing

## 2020-05-18 NOTE — CARE COORDINATION
Spoke with pt re: dc plans and re-admissions. Pt states she lives at home with her boyfriend and plans to return to [facility] upon dc. Pt goes to her outpt HD clinic TTS per Prem lopez. Pt has a walker at home that she uses as needed. Spoke with pt about possible HH services upon dc. Pt expressed tht she did not want HH at this time. Pts main compliant is that she always feels short of breath. Per 02 eval, pt does not qualify for home 02. Currently pt is sitting up in bed wearing a nasal cannula, but the 02 is off at the wall. Pt does have a PCP and informed me that she has an appt scheduled for Wed. 5/20. Encouraged pt to keep that appt and to discuss her issues with her DR. And consider outpt work up for her resp issues . CM has also re-educated the pt about following her diet and fluid restrictions at home. Also encouraged to stop smoking and this causes further issues with sob. Cm has reached out to Vinod Fuchs DO of Outpt HD services about pt re-admission from HD clinic. Shreyas Gunn has informed me that they have also been working with pt in the clinic with volume adjustments, length of treatments and educatuon on home compliance as well. Shreyas Gunn will continue to follow up with the pt and monitor the adjustments made.     At this time pt safe to dc back home, will follow for jackie changes   Electronically signed by Rikki Baugh RN on 5/18/2020 at 12:22 PM

## 2020-05-19 ENCOUNTER — CARE COORDINATION (OUTPATIENT)
Dept: CASE MANAGEMENT | Age: 60
End: 2020-05-19

## 2020-05-19 NOTE — CARE COORDINATION
[facility] Transitions Initial Follow Up Call    Call within 2 business days of discharge: Yes    Patient: Christel Staples Patient : 1960   MRN: [id number]  Reason for Admission: Volume Overload  Discharge Date: 20 RARS: Readmission Risk Score: 17      Last Discharge [facility]       Complaint Diagnosis Description Type Department Provider    20 Shortness of Breath Shortness of breath . .. ED to Hosp-Admission (Discharged) (ADMITTED) [facility] Hernando Diehl MD; Rodrick Gamble Spoke with: Joanna 86: [facility]     Non-face-to-face services provided:  Reviewed encounter information for continuity of care prior to follow up phone call - chart notes, consults, progress notes, test results, med list, appointments, AVS, other information. Care Transitions 24 Hour Call    Do you have all of your prescriptions and are they filled?:  Yes  Care Transitions Interventions         Follow Up: Placed a call to the patient for an initial follow up call post discharge. She reported that she is doing better this am.  She did say that last night when she was getting into bed and trying to go to sleep, that she had some shortness of breath, anxiety and slight cough. She said she had to get up and sit up for awhile before she finally go eased enough to get comfortable in bed. She has been fine since getting some sleep. She denied any med changes with this hospitalization. Did reconcile meds. She does have an appointment for hospital follow up with PCP tomorrow. She does HD on // schedule. She is not aware of any other issues. She reported that she is eating and drinking well. Is able to do daily weights, knows what to report. Is able to check blood pressure. Does not have oxygen at home, said she did not qualify. No other concerns noted. Did briefly discuss COVID 19 information as below. To call prn issues. CTN to follow up as indicated.

## 2020-05-20 ENCOUNTER — OFFICE VISIT (OUTPATIENT)
Dept: PRIMARY CARE CLINIC | Age: 60
End: 2020-05-20
Payer: MEDICARE

## 2020-05-20 VITALS
RESPIRATION RATE: 20 BRPM | DIASTOLIC BLOOD PRESSURE: 84 MMHG | WEIGHT: 136 LBS | BODY MASS INDEX: 27.42 KG/M2 | HEART RATE: 91 BPM | OXYGEN SATURATION: 94 % | HEIGHT: 59 IN | TEMPERATURE: 98.5 F | SYSTOLIC BLOOD PRESSURE: 126 MMHG

## 2020-05-20 PROCEDURE — 1111F DSCHRG MED/CURRENT MED MERGE: CPT | Performed by: NURSE PRACTITIONER

## 2020-05-20 PROCEDURE — 99496 TRANSJ CARE MGMT HIGH F2F 7D: CPT | Performed by: NURSE PRACTITIONER

## 2020-05-20 RX ORDER — IPRATROPIUM BROMIDE AND ALBUTEROL SULFATE 2.5; .5 MG/3ML; MG/3ML
1 SOLUTION RESPIRATORY (INHALATION) EVERY 4 HOURS
Qty: 360 ML | Refills: 1 | Status: SHIPPED | OUTPATIENT
Start: 2020-05-20 | End: 2021-03-01 | Stop reason: SDUPTHER

## 2020-05-20 RX ORDER — ONDANSETRON 4 MG/1
4 TABLET, FILM COATED ORAL EVERY 6 HOURS
COMMUNITY

## 2020-05-20 RX ORDER — ALBUTEROL SULFATE 90 UG/1
2 AEROSOL, METERED RESPIRATORY (INHALATION) EVERY 6 HOURS PRN
Qty: 1 INHALER | Refills: 3 | Status: SHIPPED | OUTPATIENT
Start: 2020-05-20 | End: 2020-10-16 | Stop reason: SDUPTHER

## 2020-05-20 ASSESSMENT — ENCOUNTER SYMPTOMS
SHORTNESS OF BREATH: 1
GASTROINTESTINAL NEGATIVE: 1
EYES NEGATIVE: 1

## 2020-05-20 NOTE — PROGRESS NOTES
Post-Discharge Transitional Care Management Services or Hospital Follow Up      Barry Robbins   YOB: 1960    Date of Office Visit:  5/20/2020  Date of Hospital Admission: 5/16/20  Date of Hospital Discharge: 5/18/20  Readmission Risk Score(high >=14%. Medium >=10%):Readmission Risk Score: 17      Care management risk score Rising risk (score 2-5) and Complex Care (Scores >=6): 6     Non face to face  following discharge, date last encounter closed (first attempt may have been earlier): 5/19/2020  9:48 AM 5/19/2020  9:48 AM    Call initiated 2 business days of discharge: Yes     Patient Active Problem List   Diagnosis    Diabetes mellitus with ophthalmic complication ([address]    Arthropathy    Proteinuria    Disorder of phosphorus metabolism    HTN (hypertension)    Glaucoma    [facility]    Breast density    Dialysis AV fistula malfunction [facility]    ESRD (end stage renal disease) on [facility] ([facility]    CKD (chronic kidney disease) stage V requiring chronic dialysis (HCC)    Acute diastolic heart failure (HCC)    Volume overload    SOB (shortness of breath)       Allergies   Allergen Reactions    Bupropion Nausea Only    Sulfa Antibiotics      Causes hypoglycemia     Varenicline     Wellbutrin [Bupropion Hcl] Nausea Only    Azithromycin Rash    Penicillins Rash       Medications listed as ordered at the time of discharge from hospital   [place], [address] Medication Instructions CLARK:    Printed on:05/20/20 8563   Medication Information                      albuterol sulfate HFA (PROVENTIL HFA) 108 (90 Base) MCG/ACT inhaler  Inhale 2 puffs into the lungs every 6 hours as needed for Wheezing             aspirin EC 81 MG EC tablet  Take 81 mg by mouth daily             B-D ULTRAFINE III SHORT PEN 31G X 8 MM MISC  USE ONCE DAILY TO INJECT INSULIN DAILY             bimatoprost 0.01 % SOLN  Apply 1 drop to eye nightly.  One drop in left eye at hs             blood glucose monitor kit and supplies  1 kit by Other route 3 times daily Test three times a day & as needed for symptoms of irregular blood glucose. Blood Glucose Monitoring Suppl NIKOS  by Does not apply route. Pt will also need new lancet device if not included in kit. brimonidine (ALPHAGAN P) 0.15 % ophthalmic solution  Place 1 drop into the left eye 3 times daily              carvedilol (COREG) 12.5 MG tablet  Take 12.5 mg by mouth 2 times daily (with meals)             Cetirizine HCl (ZYRTEC ALLERGY PO)  Take 10 mg by mouth daily              Cholecalciferol (VITAMIN D3) 1000 units CAPS  Take by mouth             dicyclomine (BENTYL) 10 MG capsule  Take 1 capsule by mouth 4 times daily             dorzolamide (TRUSOPT) 2 % ophthalmic solution  Place 1 drop into the left eye 2 times daily              famotidine (PEPCID) 20 MG tablet  TAKE 1 TABLET BY MOUTH TWICE DAILY             fluticasone (FLONASE) 50 MCG/ACT nasal spray  2 sprays by Nasal route daily. To keep ears opened up             FREESTYLE LITE strip  USE TO TEST BLOOD SUGAR TWICE DAILY             Glucose Blood (BLOOD GLUCOSE TEST STRIPS) STRP  Check blood sugar twice a day for recent medication adjustments. homatropine 5 % ophthalmic solution  Place 1 drop into the left eye daily              insulin glargine (LANTUS SOLOSTAR) 100 UNIT/ML injection pen  INJECT 10 UNITS INTO THE SKIN ONCE DAILY             Insulin Syringe-Needle U-100 (ACCUSURE INS SYR 1CC/31GX5/16\") 31G X 5/16\" 1 ML MISC  by Does not apply route.              ipratropium-albuterol (DUONEB) 0.5-2.5 (3) MG/3ML SOLN nebulizer solution  Inhale 3 mLs into the lungs every 4 hours             lactulose (CHRONULAC) 10 GM/15ML solution  TAKE 30 ML BY MOUTH THREE TIMES DAILY             losartan (COZAAR) 50 MG tablet  TAKE 1 TABLET BY MOUTH TWICE DAILY             midodrine (PROAMATINE) 5 MG tablet  TK 1 T PO HALF WAY THROUGH TREATMENT AS DIRECTED

## 2020-05-21 LAB
BLOOD CULTURE, ROUTINE: NORMAL
CULTURE, BLOOD 2: NORMAL

## 2020-05-22 ENCOUNTER — CARE COORDINATION (OUTPATIENT)
Dept: CASE MANAGEMENT | Age: 60
End: 2020-05-22

## 2020-05-26 ENCOUNTER — TELEPHONE (OUTPATIENT)
Dept: PRIMARY CARE CLINIC | Age: 60
End: 2020-05-26

## 2020-05-27 ENCOUNTER — READMISSION MANAGEMENT (OUTPATIENT)
Dept: CALL CENTER | Facility: HOSPITAL | Age: 60
End: 2020-05-27

## 2020-05-27 NOTE — OUTREACH NOTE
COPD/PN Week 4 Survey      Responses   [facility] patient discharged from?  [place]   COVID-19 Test Status  Negative   Does the patient have one of the following disease processes/diagnoses(primary or secondary)?  COPD/Pneumonia   Was the primary reason for admission:  Pneumonia   Week 4 attempt successful?  Yes   Call start time  1209   Call end time  1212   General alerts for this patient  history of noncompliance with dialysis and several admissions for volume overload   Discharge diagnosis  Pneumonia,   UTI   Meds reviewed with patient/caregiver?  Yes   Is the patient having any side effects they believe may be caused by any medication additions or changes?  No   Is the patient taking all medications as directed (includes completed medication regime)?  Yes   Has the patient kept scheduled appointments due by today?  Yes   Comments  Dialysis and appt on Friday   Is the patient still receiving Home Health Services?  N/A   Psychosocial issues?  No   What is the patient's perception of their health status since discharge?  Improving   Nursing Interventions  Nurse provided patient education   Nursing interventions  Educated on importance of maintaining up to date immunizations as advised by provider   Is the patient/caregiver able to teach back the hierarchy of who to call/visit for symptoms/problems? PCP, Specialist, Home health nurse, Urgent Care, ED, 911  Yes   Additional teach back comments  No fever, has had immunizations she says, SOA at times, O2 @ times at dialysis.   Is the patient/caregiver able to teach back signs and symptoms of worsening condition:  Fever/chills, Shortness of breath, Chest pain   Is the patient/caregiver able to teach back importance of completing antibiotic course of treatment?  Yes   Week 4 call completed?  Yes   Would the patient like one additional call?  No   Graduated  Yes   Did the patient feel the follow up calls were helpful during their recovery period?  Yes   Was the  number of calls appropriate?  Yes          Delaney Lester RN

## 2020-05-28 ENCOUNTER — CARE COORDINATION (OUTPATIENT)
Dept: CASE MANAGEMENT | Age: 60
End: 2020-05-28

## 2020-05-29 ENCOUNTER — VIRTUAL VISIT (OUTPATIENT)
Dept: PRIMARY CARE CLINIC | Age: 60
End: 2020-05-29
Payer: MEDICARE

## 2020-05-29 PROCEDURE — 99443 PR PHYS/QHP TELEPHONE EVALUATION 21-30 MIN: CPT | Performed by: NURSE PRACTITIONER

## 2020-05-29 RX ORDER — TRAZODONE HYDROCHLORIDE 50 MG/1
50 TABLET ORAL NIGHTLY PRN
Qty: 30 TABLET | Refills: 1 | Status: SHIPPED | OUTPATIENT
Start: 2020-05-29 | End: 2020-06-05 | Stop reason: SINTOL

## 2020-05-29 ASSESSMENT — ENCOUNTER SYMPTOMS
RESPIRATORY NEGATIVE: 1
GASTROINTESTINAL NEGATIVE: 1
EYES NEGATIVE: 1

## 2020-05-29 NOTE — PROGRESS NOTES
[address] [address]            Phone:  [phone]  Fax:  [phone]    Jaydon Baca is a 56 y.o. female evaluated via telephone on 5/29/2020. Consent:    She and/or health care decision maker is aware that that she may receive a bill for this telephone service, depending on her insurance coverage, and has provided verbal consent to proceed: Yes      HPI  Chief Complaint   Patient presents with    Shortness of Breath     improving       I communicated with the patient and/or health care decision maker about follow up on SOB. She reports this has improved some since last visit. She is having issues with falling asleep. She has tried otc meds without any success. Review of Systems   Constitutional: Negative. HENT: Negative. Eyes: Negative. Respiratory: Negative. Cardiovascular: Negative. Gastrointestinal: Negative. Endocrine: Negative. Genitourinary: Negative. Musculoskeletal: Negative. Skin: Negative. Neurological: Negative. Hematological: Negative. Psychiatric/Behavioral: Positive for sleep disturbance. PLAN    Details of this discussion including any medical advice provided:     1. Primary insomnia    - traZODone (DESYREL) 50 MG tablet; Take 1 tablet by mouth nightly as needed for Sleep  Dispense: 30 tablet; Refill: 1    2. Nocturnal hypoxia      3. Counseling, unspecified      I am having patient start trazodone 25 mg nightly to see if this will help with her insomnia. I am awaiting results on her overnight oximetry. If it does show her O2 level dropping we will plan to start overnight oxygen. I am having patient return couple weeks for telephone visit for follow-up on start of trazodone. I affirm this is a Patient Initiated Episode with an Established Patient who has not had a related appointment within my department in the past 7 days or scheduled within the next 24 hours.       Total Time: minutes:

## 2020-06-02 ENCOUNTER — CARE COORDINATION (OUTPATIENT)
Dept: CASE MANAGEMENT | Age: 60
End: 2020-06-02

## 2020-06-02 NOTE — CARE COORDINATION
[facility] Transitions Follow Up Call    2020    Patient: Mauro Bauman  Patient : 1960   MRN: [id number]  Reason for Admission:   Discharge Date: 20 RARS: Readmission Risk Score: 16         Spoke with: [address] Transitions Subsequent and Final Call    Subsequent and Final Calls  Do you have any ongoing symptoms?:  No  Have your medications changed?:  No  Do you have any questions related to your medications?:  No  Do you currently have any active services?:  No  Do you have any needs or concerns that I can assist you with?:  No  Identified Barriers:  None  Care Transitions Interventions  Other Interventions: Follow Up : Spoke with patient today for follow up CTC call. She says she is doing fair today, having some foot cramps. She says she had some extra fluid and they took it off during HD yesterday. She said that the tech gave her some saline to help with the cramping. She says she is eating good, she is not sure how many ml's to drink per day due to being on HD. She says no one has ever told her how much she can have. Advised her to discuss with her dietician at dialysis, and she could advise her on this matter, as every patient is different. She says she will. She uses a mask when she is out, no s/s of COVID-19. Will follow up with her at a later time. Advised to call with any needs prn.    Future Appointments   Date Time Provider Darrell Price   2020 11:45 AM RAMONA Lucia [facility]       Rupert Puentes RN

## 2020-06-03 ENCOUNTER — HOSPITAL ENCOUNTER (EMERGENCY)
Age: 60
Discharge: HOME | End: 2020-06-03
Payer: MEDICARE

## 2020-06-03 ENCOUNTER — TELEPHONE (OUTPATIENT)
Dept: PRIMARY CARE CLINIC | Age: 60
End: 2020-06-03

## 2020-06-03 VITALS
HEIGHT: 59 IN | HEART RATE: 86 BPM | OXYGEN SATURATION: 94 % | BODY MASS INDEX: 27.42 KG/M2 | DIASTOLIC BLOOD PRESSURE: 90 MMHG | WEIGHT: 136 LBS | TEMPERATURE: 98.6 F | SYSTOLIC BLOOD PRESSURE: 182 MMHG | RESPIRATION RATE: 18 BRPM

## 2020-06-03 PROCEDURE — 99283 EMERGENCY DEPT VISIT LOW MDM: CPT

## 2020-06-03 NOTE — ED PROVIDER NOTES
[id number]  [phone]            DISCHARGE MEDICATIONS:  Discharge Medication List as of 6/3/2020  6:51 PM             (Please note that portions of this note were completed with a voice recognitionprogram.  Efforts were made to edit the dictations but occasionally words are mis-transcribed.)    RAMONA Rosa (electronically signed)          RAMONA Rosa  06/04/20 1243

## 2020-06-04 ENCOUNTER — CARE COORDINATION (OUTPATIENT)
Dept: CASE MANAGEMENT | Age: 60
End: 2020-06-04

## 2020-06-04 ASSESSMENT — ENCOUNTER SYMPTOMS
SHORTNESS OF BREATH: 0
FACIAL SWELLING: 0
WHEEZING: 0

## 2020-06-05 ENCOUNTER — TELEPHONE (OUTPATIENT)
Dept: PRIMARY CARE CLINIC | Age: 60
End: 2020-06-05

## 2020-06-05 ENCOUNTER — CARE COORDINATION (OUTPATIENT)
Dept: CASE MANAGEMENT | Age: 60
End: 2020-06-05

## 2020-06-05 NOTE — CARE COORDINATION
[facility] Transitions Follow Up Call    2020    Patient: Dayna Bowden  Patient : 1960   MRN: [id number]  Reason for Admission:   Discharge Date: 6/3/20 RARS: Readmission Risk Score: 16         Spoke with: N/A    Care Transitions Subsequent and Final Call    Subsequent and Final Calls  Care Transitions Interventions  Other Interventions: Follow Up : Attempt #2 to make contact with Nenita Suarez for CTC call without success. Unable to leave a message regarding intent of call and call back information. Will try again at a later time.     Future Appointments   Date Time Provider Darrell Price   2020 11:45 AM Ara Felty, APRN [facility]       Sammy Becerril RN

## 2020-06-08 ENCOUNTER — APPOINTMENT (OUTPATIENT)
Dept: GENERAL RADIOLOGY | Age: 60
End: 2020-06-08
Payer: MEDICARE

## 2020-06-08 ENCOUNTER — HOSPITAL ENCOUNTER (OUTPATIENT)
Age: 60
Setting detail: OBSERVATION
Discharge: HOME | End: 2020-06-09
Attending: EMERGENCY MEDICINE | Admitting: INTERNAL MEDICINE
Payer: MEDICARE

## 2020-06-08 PROBLEM — E87.70 FLUID OVERLOAD: Status: ACTIVE | Noted: 2020-06-08

## 2020-06-08 LAB
ALBUMIN SERPL-MCNC: 4.1 G/DL (ref 3.5–5.2)
ALP BLD-CCNC: 375 U/L (ref 35–104)
ALT SERPL-CCNC: 37 U/L (ref 5–33)
ANION GAP SERPL CALCULATED.3IONS-SCNC: 15 MMOL/L (ref 7–19)
AST SERPL-CCNC: 48 U/L (ref 5–32)
BILIRUB SERPL-MCNC: 0.6 MG/DL (ref 0.2–1.2)
BUN BLDV-MCNC: 29 MG/DL (ref 8–23)
CALCIUM SERPL-MCNC: 9.5 MG/DL (ref 8.8–10.2)
CHLORIDE BLD-SCNC: 88 MMOL/L (ref 98–111)
CO2: 31 MMOL/L (ref 22–29)
CREAT SERPL-MCNC: 4 MG/DL (ref 0.5–0.9)
EKG P AXIS: 59 DEGREES
EKG P-R INTERVAL: 132 MS
EKG Q-T INTERVAL: 394 MS
EKG QRS DURATION: 80 MS
EKG QTC CALCULATION (BAZETT): 430 MS
EKG T AXIS: 1 DEGREES
GFR NON-AFRICAN AMERICAN: 11
GLUCOSE BLD-MCNC: 145 MG/DL (ref 70–99)
GLUCOSE BLD-MCNC: 245 MG/DL (ref 70–99)
GLUCOSE BLD-MCNC: 274 MG/DL (ref 74–109)
HCT VFR BLD CALC: 35.9 % (ref 37–47)
HEMOGLOBIN: 10.9 G/DL (ref 12–16)
MCH RBC QN AUTO: 28.8 PG (ref 27–31)
MCHC RBC AUTO-ENTMCNC: 30.4 G/DL (ref 33–37)
MCV RBC AUTO: 94.7 FL (ref 81–99)
PDW BLD-RTO: 20.1 % (ref 11.5–14.5)
PERFORMED ON: ABNORMAL
PERFORMED ON: ABNORMAL
PLATELET # BLD: 201 K/UL (ref 130–400)
PMV BLD AUTO: 9.4 FL (ref 9.4–12.3)
POTASSIUM SERPL-SCNC: 4.3 MMOL/L (ref 3.5–5)
PRO-BNP: ABNORMAL PG/ML (ref 0–900)
RBC # BLD: 3.79 M/UL (ref 4.2–5.4)
SODIUM BLD-SCNC: 134 MMOL/L (ref 136–145)
TOTAL PROTEIN: 6.8 G/DL (ref 6.6–8.7)
TROPONIN: 0.04 NG/ML (ref 0–0.03)
WBC # BLD: 5.3 K/UL (ref 4.8–10.8)

## 2020-06-08 PROCEDURE — 82947 ASSAY GLUCOSE BLOOD QUANT: CPT

## 2020-06-08 PROCEDURE — 84484 ASSAY OF TROPONIN QUANT: CPT

## 2020-06-08 PROCEDURE — 6370000000 HC RX 637 (ALT 250 FOR IP): Performed by: INTERNAL MEDICINE

## 2020-06-08 PROCEDURE — 83880 ASSAY OF NATRIURETIC PEPTIDE: CPT

## 2020-06-08 PROCEDURE — 8010000000 HC HEMODIALYSIS ACUTE INPT

## 2020-06-08 PROCEDURE — 93005 ELECTROCARDIOGRAM TRACING: CPT | Performed by: EMERGENCY MEDICINE

## 2020-06-08 PROCEDURE — G0378 HOSPITAL OBSERVATION PER HR: HCPCS

## 2020-06-08 PROCEDURE — 93010 ELECTROCARDIOGRAM REPORT: CPT | Performed by: INTERNAL MEDICINE

## 2020-06-08 PROCEDURE — 71045 X-RAY EXAM CHEST 1 VIEW: CPT

## 2020-06-08 PROCEDURE — 2580000003 HC RX 258: Performed by: INTERNAL MEDICINE

## 2020-06-08 PROCEDURE — G0257 UNSCHED DIALYSIS ESRD PT HOS: HCPCS

## 2020-06-08 PROCEDURE — 80053 COMPREHEN METABOLIC PANEL: CPT

## 2020-06-08 PROCEDURE — 36415 COLL VENOUS BLD VENIPUNCTURE: CPT

## 2020-06-08 PROCEDURE — 99285 EMERGENCY DEPT VISIT HI MDM: CPT

## 2020-06-08 PROCEDURE — 85027 COMPLETE CBC AUTOMATED: CPT

## 2020-06-08 RX ORDER — POTASSIUM CHLORIDE 20 MEQ/1
40 TABLET, EXTENDED RELEASE ORAL PRN
Status: DISCONTINUED | OUTPATIENT
Start: 2020-06-08 | End: 2020-06-09 | Stop reason: HOSPADM

## 2020-06-08 RX ORDER — DEXTROSE MONOHYDRATE 50 MG/ML
100 INJECTION, SOLUTION INTRAVENOUS PRN
Status: DISCONTINUED | OUTPATIENT
Start: 2020-06-08 | End: 2020-06-09 | Stop reason: HOSPADM

## 2020-06-08 RX ORDER — CETIRIZINE HYDROCHLORIDE 10 MG/1
10 TABLET ORAL DAILY
Status: DISCONTINUED | OUTPATIENT
Start: 2020-06-08 | End: 2020-06-09 | Stop reason: HOSPADM

## 2020-06-08 RX ORDER — DEXTROSE MONOHYDRATE 25 G/50ML
12.5 INJECTION, SOLUTION INTRAVENOUS PRN
Status: DISCONTINUED | OUTPATIENT
Start: 2020-06-08 | End: 2020-06-09 | Stop reason: HOSPADM

## 2020-06-08 RX ORDER — POTASSIUM CHLORIDE 7.45 MG/ML
10 INJECTION INTRAVENOUS PRN
Status: DISCONTINUED | OUTPATIENT
Start: 2020-06-08 | End: 2020-06-09 | Stop reason: HOSPADM

## 2020-06-08 RX ORDER — BRIMONIDINE TARTRATE 2 MG/ML
1 SOLUTION/ DROPS OPHTHALMIC 3 TIMES DAILY
Status: DISCONTINUED | OUTPATIENT
Start: 2020-06-08 | End: 2020-06-09 | Stop reason: HOSPADM

## 2020-06-08 RX ORDER — PROMETHAZINE HYDROCHLORIDE 25 MG/1
12.5 TABLET ORAL EVERY 6 HOURS PRN
Status: DISCONTINUED | OUTPATIENT
Start: 2020-06-08 | End: 2020-06-09 | Stop reason: HOSPADM

## 2020-06-08 RX ORDER — FAMOTIDINE 20 MG/1
1 TABLET, FILM COATED ORAL 2 TIMES DAILY
Status: DISCONTINUED | OUTPATIENT
Start: 2020-06-08 | End: 2020-06-08 | Stop reason: DRUGHIGH

## 2020-06-08 RX ORDER — LOSARTAN POTASSIUM 50 MG/1
50 TABLET ORAL 2 TIMES DAILY
Status: DISCONTINUED | OUTPATIENT
Start: 2020-06-08 | End: 2020-06-09 | Stop reason: HOSPADM

## 2020-06-08 RX ORDER — INSULIN GLARGINE 100 [IU]/ML
10 INJECTION, SOLUTION SUBCUTANEOUS NIGHTLY
Status: DISCONTINUED | OUTPATIENT
Start: 2020-06-08 | End: 2020-06-09 | Stop reason: HOSPADM

## 2020-06-08 RX ORDER — FAMOTIDINE 20 MG/1
10 TABLET, FILM COATED ORAL DAILY
Status: DISCONTINUED | OUTPATIENT
Start: 2020-06-08 | End: 2020-06-09 | Stop reason: HOSPADM

## 2020-06-08 RX ORDER — LATANOPROST 50 UG/ML
1 SOLUTION/ DROPS OPHTHALMIC DAILY
Status: DISCONTINUED | OUTPATIENT
Start: 2020-06-08 | End: 2020-06-08 | Stop reason: SDUPTHER

## 2020-06-08 RX ORDER — TIMOLOL MALEATE 5 MG/ML
1 SOLUTION/ DROPS OPHTHALMIC 2 TIMES DAILY
Status: DISCONTINUED | OUTPATIENT
Start: 2020-06-08 | End: 2020-06-09 | Stop reason: HOSPADM

## 2020-06-08 RX ORDER — ACETAMINOPHEN 650 MG/1
650 SUPPOSITORY RECTAL EVERY 6 HOURS PRN
Status: DISCONTINUED | OUTPATIENT
Start: 2020-06-08 | End: 2020-06-09 | Stop reason: HOSPADM

## 2020-06-08 RX ORDER — PRAVASTATIN SODIUM 10 MG
10 TABLET ORAL NIGHTLY
Status: DISCONTINUED | OUTPATIENT
Start: 2020-06-08 | End: 2020-06-08

## 2020-06-08 RX ORDER — SODIUM CHLORIDE 0.9 % (FLUSH) 0.9 %
10 SYRINGE (ML) INJECTION PRN
Status: DISCONTINUED | OUTPATIENT
Start: 2020-06-08 | End: 2020-06-09 | Stop reason: HOSPADM

## 2020-06-08 RX ORDER — SEVELAMER CARBONATE 800 MG/1
800 TABLET, FILM COATED ORAL DAILY
Status: DISCONTINUED | OUTPATIENT
Start: 2020-06-08 | End: 2020-06-09 | Stop reason: HOSPADM

## 2020-06-08 RX ORDER — ONDANSETRON 2 MG/ML
4 INJECTION INTRAMUSCULAR; INTRAVENOUS EVERY 6 HOURS PRN
Status: DISCONTINUED | OUTPATIENT
Start: 2020-06-08 | End: 2020-06-09 | Stop reason: HOSPADM

## 2020-06-08 RX ORDER — SERTRALINE HYDROCHLORIDE 25 MG/1
25 TABLET, FILM COATED ORAL DAILY
Status: DISCONTINUED | OUTPATIENT
Start: 2020-06-08 | End: 2020-06-09 | Stop reason: HOSPADM

## 2020-06-08 RX ORDER — SODIUM CHLORIDE 0.9 % (FLUSH) 0.9 %
10 SYRINGE (ML) INJECTION EVERY 12 HOURS SCHEDULED
Status: DISCONTINUED | OUTPATIENT
Start: 2020-06-08 | End: 2020-06-09 | Stop reason: HOSPADM

## 2020-06-08 RX ORDER — POLYETHYLENE GLYCOL 3350 17 G/17G
17 POWDER, FOR SOLUTION ORAL DAILY PRN
Status: DISCONTINUED | OUTPATIENT
Start: 2020-06-08 | End: 2020-06-09 | Stop reason: HOSPADM

## 2020-06-08 RX ORDER — HOMATROPINE HYDROBROMIDE OPHTHALMIC 50 MG/ML
1 SOLUTION OPHTHALMIC DAILY
Status: DISCONTINUED | OUTPATIENT
Start: 2020-06-08 | End: 2020-06-09 | Stop reason: HOSPADM

## 2020-06-08 RX ORDER — MAGNESIUM SULFATE IN WATER 40 MG/ML
2 INJECTION, SOLUTION INTRAVENOUS PRN
Status: DISCONTINUED | OUTPATIENT
Start: 2020-06-08 | End: 2020-06-09 | Stop reason: HOSPADM

## 2020-06-08 RX ORDER — IPRATROPIUM BROMIDE AND ALBUTEROL SULFATE 2.5; .5 MG/3ML; MG/3ML
1 SOLUTION RESPIRATORY (INHALATION) EVERY 4 HOURS PRN
Status: DISCONTINUED | OUTPATIENT
Start: 2020-06-08 | End: 2020-06-09 | Stop reason: HOSPADM

## 2020-06-08 RX ORDER — CARVEDILOL 12.5 MG/1
12.5 TABLET ORAL 2 TIMES DAILY WITH MEALS
Status: DISCONTINUED | OUTPATIENT
Start: 2020-06-08 | End: 2020-06-09 | Stop reason: HOSPADM

## 2020-06-08 RX ORDER — LATANOPROST 50 UG/ML
1 SOLUTION/ DROPS OPHTHALMIC NIGHTLY
Status: DISCONTINUED | OUTPATIENT
Start: 2020-06-08 | End: 2020-06-09 | Stop reason: HOSPADM

## 2020-06-08 RX ORDER — ACETAMINOPHEN 325 MG/1
650 TABLET ORAL EVERY 6 HOURS PRN
Status: DISCONTINUED | OUTPATIENT
Start: 2020-06-08 | End: 2020-06-09 | Stop reason: HOSPADM

## 2020-06-08 RX ORDER — FLUTICASONE PROPIONATE 50 MCG
2 SPRAY, SUSPENSION (ML) NASAL DAILY
Status: DISCONTINUED | OUTPATIENT
Start: 2020-06-08 | End: 2020-06-09 | Stop reason: HOSPADM

## 2020-06-08 RX ORDER — DORZOLAMIDE HCL 20 MG/ML
1 SOLUTION/ DROPS OPHTHALMIC 2 TIMES DAILY
Status: DISCONTINUED | OUTPATIENT
Start: 2020-06-08 | End: 2020-06-09 | Stop reason: HOSPADM

## 2020-06-08 RX ORDER — PRAVASTATIN SODIUM 20 MG
10 TABLET ORAL NIGHTLY
Status: DISCONTINUED | OUTPATIENT
Start: 2020-06-08 | End: 2020-06-09 | Stop reason: HOSPADM

## 2020-06-08 RX ORDER — ASPIRIN 81 MG/1
81 TABLET ORAL DAILY
Status: DISCONTINUED | OUTPATIENT
Start: 2020-06-08 | End: 2020-06-09 | Stop reason: HOSPADM

## 2020-06-08 RX ORDER — NICOTINE POLACRILEX 4 MG
15 LOZENGE BUCCAL PRN
Status: DISCONTINUED | OUTPATIENT
Start: 2020-06-08 | End: 2020-06-09 | Stop reason: HOSPADM

## 2020-06-08 RX ORDER — ACETAMINOPHEN 500 MG
1000 TABLET ORAL EVERY 4 HOURS PRN
Status: DISCONTINUED | OUTPATIENT
Start: 2020-06-08 | End: 2020-06-09 | Stop reason: HOSPADM

## 2020-06-08 RX ADMIN — INSULIN GLARGINE 10 UNITS: 100 INJECTION, SOLUTION SUBCUTANEOUS at 21:29

## 2020-06-08 RX ADMIN — FAMOTIDINE 10 MG: 20 TABLET ORAL at 15:10

## 2020-06-08 RX ADMIN — LOSARTAN POTASSIUM 50 MG: 50 TABLET, FILM COATED ORAL at 21:29

## 2020-06-08 RX ADMIN — BRIMONIDINE TARTRATE 1 DROP: 2 SOLUTION OPHTHALMIC at 23:09

## 2020-06-08 RX ADMIN — ASPIRIN 81 MG: 81 TABLET, COATED ORAL at 15:10

## 2020-06-08 RX ADMIN — SERTRALINE HYDROCHLORIDE 25 MG: 25 TABLET ORAL at 21:29

## 2020-06-08 RX ADMIN — PRAVASTATIN SODIUM 10 MG: 20 TABLET ORAL at 21:28

## 2020-06-08 RX ADMIN — TIMOLOL MALEATE 1 DROP: 5 SOLUTION/ DROPS OPHTHALMIC at 23:09

## 2020-06-08 RX ADMIN — LATANOPROST 1 DROP: 50 SOLUTION OPHTHALMIC at 23:51

## 2020-06-08 RX ADMIN — CARVEDILOL 12.5 MG: 12.5 TABLET, FILM COATED ORAL at 17:32

## 2020-06-08 RX ADMIN — SODIUM CHLORIDE, PRESERVATIVE FREE 10 ML: 5 INJECTION INTRAVENOUS at 21:29

## 2020-06-08 RX ADMIN — DORZOLAMIDE HYDROCHLORIDE 1 DROP: 20 SOLUTION/ DROPS OPHTHALMIC at 23:09

## 2020-06-08 RX ADMIN — SODIUM CHLORIDE, PRESERVATIVE FREE 10 ML: 5 INJECTION INTRAVENOUS at 15:10

## 2020-06-08 RX ADMIN — ACETAMINOPHEN 1000 MG: 500 TABLET, FILM COATED ORAL at 13:00

## 2020-06-08 ASSESSMENT — PAIN SCALES - GENERAL
PAINLEVEL_OUTOF10: 0
PAINLEVEL_OUTOF10: 0
PAINLEVEL_OUTOF10: 5
PAINLEVEL_OUTOF10: 3

## 2020-06-08 ASSESSMENT — ENCOUNTER SYMPTOMS
ABDOMINAL PAIN: 0
SHORTNESS OF BREATH: 1
DIARRHEA: 0
EYE PAIN: 0
VOMITING: 0

## 2020-06-08 ASSESSMENT — PAIN DESCRIPTION - LOCATION: LOCATION: LEG

## 2020-06-08 NOTE — PROGRESS NOTES
Renal Consult Note    Thank you to requesting provider: Dr Marcie Garcia, for asking us to see Essie Wyman    Reason for consultation:      Chief Complaint:      History of Presenting Illness         Patient is a 55 yo pleasant woman who has ESRD, hypertension and diabetes. She has been on chronic maintenance HD (TTS). She received her dialysis treatment on 6/6 but she remained fluid overloaded. She became increasingly dyspneic over the weekend ad she reported to [facility] ED on 6/8. She was hypoxemic. Renal service was consulted to manage her ESRD. Patient is anuric. She denied major cough, has no fever, no N,V,D. Patient has been signing off on dialysis early on repeated occasions.      Past Medical/Surgical History      Active Ambulatory Problems     Diagnosis Date Noted    Diabetes mellitus with ophthalmic complication ([address]     Arthropathy 08/13/2012    Proteinuria 08/13/2012    Disorder of phosphorus metabolism 08/13/2012    HTN (hypertension) 04/18/2013    Glaucoma 12/03/2013    Diabetic nephropathy ([address] 04/15/2015    Breast density 12/28/2015    Dialysis AV fistula malfunction ([address] 02/14/2018    ESRD (end stage renal disease) on dialysis ([address] 02/21/2018    CKD (chronic kidney disease) stage V requiring chronic dialysis ([address]     Acute diastolic heart failure ([address] 01/27/2020    Volume overload 05/08/2020    SOB (shortness of breath) 05/16/2020     Resolved Ambulatory Problems     Diagnosis Date Noted    CKD (chronic kidney disease), stage V ([address] 08/13/2012    CKD (chronic kidney disease), stage IV ([address] 04/15/2015    CKD (chronic kidney disease) stage V requiring chronic dialysis ([address] 02/02/2018    Screening for colon cancer 02/21/2018     Past Medical History:   Diagnosis Date    Blind right eye     Blindness of one eye     CHF (congestive heart failure) ([address]     Diabetes ([address]     Diabetes mellitus with ophthalmic manifestations     Hemodialysis patient ([address]     Hypertension solution, TAKE 30 ML BY MOUTH THREE TIMES DAILY (Patient not taking: Reported on 5/20/2020), Disp: 8108 mL, Rfl: 1    famotidine (PEPCID) 20 MG tablet, TAKE 1 TABLET BY MOUTH TWICE DAILY, Disp: 60 tablet, Rfl: 11    pravastatin (PRAVACHOL) 10 MG tablet, TAKE 1 TABLET BY MOUTH EVERY NIGHT AT BEDTIME, Disp: 30 tablet, Rfl: 11    Cholecalciferol (VITAMIN D3) 1000 units CAPS, Take by mouth, Disp: , Rfl:     blood glucose monitor kit and supplies, 1 kit by Other route 3 times daily Test three times a day & as needed for symptoms of irregular blood glucose., Disp: 1 kit, Rfl: 0    Travoprost, BAK Free, (TRAVATAN Z) 0.004 % SOLN ophthalmic solution, 1 drop nightly, Disp: , Rfl:     midodrine (PROAMATINE) 5 MG tablet, TK 1 T PO HALF WAY THROUGH TREATMENT AS DIRECTED, Disp: , Rfl:     sevelamer (RENVELA) 800 MG tablet, Take 1 tablet by mouth daily , Disp: , Rfl:     aspirin EC 81 MG EC tablet, Take 81 mg by mouth daily, Disp: , Rfl:     FREESTYLE LITE strip, USE TO TEST BLOOD SUGAR TWICE DAILY, Disp: 100 strip, Rfl: 0    Cetirizine HCl (ZYRTEC ALLERGY PO), Take 10 mg by mouth daily , Disp: , Rfl:     fluticasone (FLONASE) 50 MCG/ACT nasal spray, 2 sprays by Nasal route daily. To keep ears opened up, Disp: 1 Bottle, Rfl: 11    Blood Glucose Monitoring Suppl NIKOS, by Does not apply route. Pt will also need new lancet device if not included in kit., Disp: 1 Device, Rfl: 0    Glucose Blood (BLOOD GLUCOSE TEST STRIPS) STRP, Check blood sugar twice a day for recent medication adjustments. , Disp: 100 strip, Rfl: 11    bimatoprost 0.01 % SOLN, Apply 1 drop to eye nightly.  One drop in left eye at hs, Disp: , Rfl:     Insulin Syringe-Needle U-100 (ACCUSURE INS SYR 1CC/31GX5/16\") 31G X 5/16\" 1 ML MISC, by Does not apply route., Disp: 100 each, Rfl: 3    timolol (TIMOPTIC-XR) 0.5 % ophthalmic gel-forming, Place 1 drop into the left eye 2 times daily , Disp: , Rfl:     dorzolamide (TRUSOPT) 2 % ophthalmic solution, Place

## 2020-06-08 NOTE — PROGRESS NOTES
4 Eyes Skin Assessment    Asia Vinson is being assessed upon: Admission    I agree that I, Loulou Foy, along with Melita Henning RN (either 2 RN's or 1 LPN and 1 RN) have performed a thorough Head to Toe Skin Assessment on the patient. ALL assessment sites listed below have been assessed. Areas assessed by both nurses:     [x]   Head, Face, and Ears   [x]   Shoulders, Back, and Chest  [x]   Arms, Elbows, and Hands   [x]   Coccyx, Sacrum, and Ischium  [x]   Legs, Feet, and Heels    Does the Patient have Skin Breakdown?  Yes    Bruising, scattered  Scratches, scattered    Jake Prevention initiated: No  Wound Care Orders initiated: No    WOC nurse consulted for Pressure Injury (Stage 3,4, Unstageable, DTI, NWPT, and Complex wounds) and New or Established Ostomies: No        Primary Nurse eSignature: Loulou Foy RN on 6/8/2020 at 3:28 PM      Co-Signer eSignature: Electronically signed by Sisi Hobson RN on 6/9/20 at 6:42 AM CDT

## 2020-06-08 NOTE — PROGRESS NOTES
Rojelio Owen arrived to room # 306. Presented with: SOB  Mental Status: Patient is oriented, alert, coherent, logical, thought processes intact and able to concentrate and follow conversation. Vitals:    06/08/20 0902   BP: (!) 159/64   Pulse: 80   Resp: 17   Temp:    SpO2: (!) 87%     Patient safety contract and falls prevention contract reviewed with patient Yes. Oriented Patient to room. Call light within reach. Yes.   Needs, issues or concerns expressed at this time: no.      Electronically signed by Chapo Priest RN on 6/8/2020 at 2:41 PM

## 2020-06-08 NOTE — ED NOTES
Pt sent to Dialysis then  To room 4100 [facility], [address] [place] [address]  06/08/20 7823

## 2020-06-08 NOTE — ED PROVIDER NOTES
MEDICAL HISTORY     Past Medical History:   Diagnosis Date    Blind right eye     partial vision loss in the L eye too, due to DM    Blindness of one eye     Right     CHF (congestive heart failure) (HCC)     CKD (chronic kidney disease), stage IV (HCC)     secondary to diabetic nephropathy    [facility]     Diabetes mellitus with ophthalmic manifestations     dx'd in 2000 but likely ongoing before that, was dx'd when she went blind in the R eye    Glaucoma     Hemodialysis patient ([facility]     dialysis tues, thurs, sat. [facility], [facility]    Hypertension     2000, dx'd at same time as the DM    Obesity     Renal osteodystrophy     Smoker     Type II or unspecified type diabetes mellitus with renal manifestations, uncontrolled(250.42)          SURGICAL HISTORY       Past Surgical History:   Procedure Laterality Date    CARPAL TUNNEL RELEASE      CATARACT REMOVAL     [address], and 1979    CHOLECYSTECTOMY      COLONOSCOPY N/A 3/9/2018    Dr ReevesFowmtq-Wbmzisqfhueylw-Mwydpwqzoiwnd AP (-) dysplasia x 1, tubular AP x  (-) dysplasia x 1, HP x 4--1 yr recall    DIALYSIS FISTULA CREATION Left 4/23/15 [facility]    Left proximal ulnar artery to cephalic vein AVF creation    EYE SURGERY      laser, several times     TYMPANOSTOMY TUBE PLACEMENT Bilateral     VASCULAR SURGERY Left 5/11/15 [facility]    US-guided cannulation left distal upper arm cephalic vein with 4-Ukrainian glide sheath; LUE AV f'grams with venography SVC; left cephalic vein BAM with [id number] julieth balloon; completion venogram [facility]    VASCULAR SURGERY Left 5/28/15 [facility]    Percutaneous cannulation left distal radial artery with 4-Ukrainian glide sheath; LUE AV f'grams with venography SVC; left cephalic vein balloon a'plasty with [id number] julieth balloon and [id number] julieth balloon; proximal and mid upper arm cephalic vein BAM with [id number] julieth balloon; completion venogram [facility]    VASCULAR SURGERY Left 6/15/15 [facility] Prescriptions    No medications on file          (Please note that portions of this note were completed with a voice recognition program.  Efforts were made to edit the dictations butoccasionally words are mis-transcribed.)    Lianne Moses MD (electronically signed)  AttendingEmergency Physician        Lianne Moses MD  06/08/20 5723

## 2020-06-08 NOTE — H&P
artery to cephalic vein AVF creation    EYE SURGERY      laser, several times     TYMPANOSTOMY TUBE PLACEMENT Bilateral     VASCULAR SURGERY Left 5/11/15 [facility]    US-guided cannulation left distal upper arm cephalic vein with 4-french glide sheath; LUE AV f'grams with venography SVC; left cephalic vein BAM with [id number] julieth balloon; completion venogram [facility]    VASCULAR SURGERY Left 5/28/15 [facility]    Percutaneous cannulation left distal radial artery with 4-french glide sheath; LUE AV f'grams with venography SVC; left cephalic vein balloon a'plasty with [id number] julieth balloon and [id number] julieth balloon; proximal and mid upper arm cephalic vein BAM with [id number] julieth balloon; completion venogram [facility]    VASCULAR SURGERY Left 6/15/15 [facility]    US-guided cannulation left cephalic vein with 4-french and later 7-french sheath; LUE AV f'grams including venography SVC; left cephalic vein stenosis balloon a'plasty with [id number] cutting balloon; completion f'gram and venogram [facility]    VASCULAR SURGERY  05/15/2017    [facility]. Left upper fistulograms/venograms.  VASCULAR SURGERY  02/14/2018    [facility]. Left upper fistulograms, left subclavian BA 12x40 atlas, left cephalic arch BA 38B45 conquest.    VASCULAR SURGERY  02/13/2019    [facility]. Left upper fistulograms,BA left SCV 8x40 conquest,stent left SCV, viabahn 13x50,left SCV stent BA 12x40 conquest.       Home Medications:  Prior to Admission medications    Medication Sig Start Date End Date Taking?  Authorizing Provider   ondansetron (ZOFRAN) 4 MG tablet Take 4 mg by mouth every 6 hours    Historical Provider, MD   ipratropium-albuterol (DUONEB) 0.5-2.5 (3) MG/3ML SOLN nebulizer solution Inhale 3 mLs into the lungs every 4 hours 5/20/20   RAMONA Franklin   albuterol sulfate HFA (PROVENTIL HFA) 108 (90 Base) MCG/ACT inhaler Inhale 2 puffs into the lungs every 6 hours as needed for Wheezing 5/20/20   RAMONA Franklin   dicyclomine (BENTYL) 10 MG capsule Take 1 capsule by mouth 4 times daily  Patient not taking: Reported on 5/20/2020 4/14/20   RAMONA Richardson   losartan (COZAAR) 50 MG tablet TAKE 1 TABLET BY MOUTH TWICE DAILY 3/17/20   RAMONA Richardson   B-D ULTRAFINE III SHORT PEN 31G X 8 MM MISC USE ONCE DAILY TO INJECT INSULIN DAILY 3/16/20   RAMONA Richardson   carvedilol (COREG) 12.5 MG tablet Take 12.5 mg by mouth 2 times daily (with meals)    Historical Provider, MD   insulin glargine (LANTUS SOLOSTAR) 100 UNIT/ML injection pen INJECT 10 UNITS INTO THE SKIN ONCE DAILY 2/7/20   RAMONA Richardson   sertraline (ZOLOFT) 25 MG tablet Take 1 tablet by mouth daily 10/4/19 5/7/20  RAMONA Richardson   lactulose ([address]) 10 GM/15ML solution TAKE 30 ML BY MOUTH THREE TIMES DAILY  Patient not taking: Reported on 5/20/2020 9/25/19   RAMONA Richardson   famotidine (PEPCID) 20 MG tablet TAKE 1 TABLET BY MOUTH TWICE DAILY 7/2/19   Reta Oakley MD   pravastatin (PRAVACHOL) 10 MG tablet TAKE 1 TABLET BY MOUTH EVERY NIGHT AT BEDTIME 7/2/19 8/1/19  Reta Oakley MD   Cholecalciferol (VITAMIN D3) 1000 units CAPS Take by mouth    Historical Provider, MD   blood glucose monitor kit and supplies 1 kit by Other route 3 times daily Test three times a day & as needed for symptoms of irregular blood glucose.  7/11/18 3/6/19  RAMONA Muhammad   Travoprost, BAK Free, (TRAVATAN Z) 0.004 % SOLN ophthalmic solution 1 drop nightly    Historical Provider, MD   midodrine (PROAMATINE) 5 MG tablet TK 1 T PO HALF WAY THROUGH TREATMENT AS DIRECTED 10/17/17   Historical Provider, MD   sevelamer (RENVELA) 800 MG tablet Take 1 tablet by mouth daily     Historical Provider, MD   aspirin EC 81 MG EC tablet Take 81 mg by mouth daily    Historical Provider, MD   FREESTYLE LITE strip USE TO TEST BLOOD SUGAR TWICE DAILY 12/29/16   Reta Oakley MD   Cetirizine HCl (ZYRTEC ALLERGY PO) Take 10 mg by mouth daily     Historical Provider, MD   fluticasone (FLONASE) 50 MCG/ACT nasal

## 2020-06-09 ENCOUNTER — TELEPHONE (OUTPATIENT)
Dept: ADMINISTRATIVE | Age: 60
End: 2020-06-09

## 2020-06-09 VITALS
WEIGHT: 145.2 LBS | OXYGEN SATURATION: 97 % | RESPIRATION RATE: 18 BRPM | TEMPERATURE: 96.9 F | HEIGHT: 58 IN | SYSTOLIC BLOOD PRESSURE: 160 MMHG | DIASTOLIC BLOOD PRESSURE: 80 MMHG | HEART RATE: 83 BPM | BODY MASS INDEX: 30.48 KG/M2

## 2020-06-09 LAB
ALBUMIN SERPL-MCNC: 3.7 G/DL (ref 3.5–5.2)
ALP BLD-CCNC: 337 U/L (ref 35–104)
ALT SERPL-CCNC: 33 U/L (ref 5–33)
ANION GAP SERPL CALCULATED.3IONS-SCNC: 11 MMOL/L (ref 7–19)
ANISOCYTOSIS: ABNORMAL
AST SERPL-CCNC: 41 U/L (ref 5–32)
BASOPHILS ABSOLUTE: 0 K/UL (ref 0–0.2)
BASOPHILS RELATIVE PERCENT: 0.7 % (ref 0–1)
BILIRUB SERPL-MCNC: 0.5 MG/DL (ref 0.2–1.2)
BUN BLDV-MCNC: 19 MG/DL (ref 8–23)
CALCIUM SERPL-MCNC: 9 MG/DL (ref 8.8–10.2)
CHLORIDE BLD-SCNC: 92 MMOL/L (ref 98–111)
CHOLESTEROL, TOTAL: 112 MG/DL (ref 160–199)
CO2: 30 MMOL/L (ref 22–29)
CREAT SERPL-MCNC: 3.1 MG/DL (ref 0.5–0.9)
EOSINOPHILS ABSOLUTE: 0.2 K/UL (ref 0–0.6)
EOSINOPHILS RELATIVE PERCENT: 4.1 % (ref 0–5)
GFR NON-AFRICAN AMERICAN: 15
GLUCOSE BLD-MCNC: 259 MG/DL (ref 74–109)
GLUCOSE BLD-MCNC: 297 MG/DL (ref 70–99)
GLUCOSE BLD-MCNC: 70 MG/DL (ref 70–99)
GLUCOSE BLD-MCNC: 73 MG/DL (ref 70–99)
HCT VFR BLD CALC: 35.7 % (ref 37–47)
HDLC SERPL-MCNC: 61 MG/DL (ref 65–121)
HEMOGLOBIN: 10.2 G/DL (ref 12–16)
HYPOCHROMIA: ABNORMAL
IMMATURE GRANULOCYTES #: 0 K/UL
LDL CHOLESTEROL CALCULATED: 25 MG/DL
LYMPHOCYTES ABSOLUTE: 0.6 K/UL (ref 1.1–4.5)
LYMPHOCYTES RELATIVE PERCENT: 15.1 % (ref 20–40)
MACROCYTES: ABNORMAL
MAGNESIUM: 2.1 MG/DL (ref 1.6–2.4)
MCH RBC QN AUTO: 28.3 PG (ref 27–31)
MCHC RBC AUTO-ENTMCNC: 28.6 G/DL (ref 33–37)
MCV RBC AUTO: 98.9 FL (ref 81–99)
MONOCYTES ABSOLUTE: 0.3 K/UL (ref 0–0.9)
MONOCYTES RELATIVE PERCENT: 6.5 % (ref 0–10)
NEUTROPHILS ABSOLUTE: 3.1 K/UL (ref 1.5–7.5)
NEUTROPHILS RELATIVE PERCENT: 73.4 % (ref 50–65)
OVALOCYTES: ABNORMAL
PDW BLD-RTO: 20.7 % (ref 11.5–14.5)
PERFORMED ON: ABNORMAL
PERFORMED ON: NORMAL
PERFORMED ON: NORMAL
PHOSPHORUS: 2.9 MG/DL (ref 2.5–4.5)
PLATELET # BLD: 177 K/UL (ref 130–400)
PLATELET SLIDE REVIEW: ADEQUATE
PMV BLD AUTO: 9 FL (ref 9.4–12.3)
POLYCHROMASIA: ABNORMAL
POTASSIUM SERPL-SCNC: 4.1 MMOL/L (ref 3.5–5)
RBC # BLD: 3.61 M/UL (ref 4.2–5.4)
SODIUM BLD-SCNC: 133 MMOL/L (ref 136–145)
TOTAL PROTEIN: 6 G/DL (ref 6.6–8.7)
TRIGL SERPL-MCNC: 131 MG/DL (ref 0–149)
WBC # BLD: 4.2 K/UL (ref 4.8–10.8)

## 2020-06-09 PROCEDURE — 94640 AIRWAY INHALATION TREATMENT: CPT

## 2020-06-09 PROCEDURE — 2580000003 HC RX 258: Performed by: INTERNAL MEDICINE

## 2020-06-09 PROCEDURE — 6360000002 HC RX W HCPCS: Performed by: INTERNAL MEDICINE

## 2020-06-09 PROCEDURE — 84100 ASSAY OF PHOSPHORUS: CPT

## 2020-06-09 PROCEDURE — 8010000000 HC HEMODIALYSIS ACUTE INPT

## 2020-06-09 PROCEDURE — G0378 HOSPITAL OBSERVATION PER HR: HCPCS

## 2020-06-09 PROCEDURE — 82947 ASSAY GLUCOSE BLOOD QUANT: CPT

## 2020-06-09 PROCEDURE — 36415 COLL VENOUS BLD VENIPUNCTURE: CPT

## 2020-06-09 PROCEDURE — 2700000000 HC OXYGEN THERAPY PER DAY

## 2020-06-09 PROCEDURE — 6370000000 HC RX 637 (ALT 250 FOR IP): Performed by: INTERNAL MEDICINE

## 2020-06-09 PROCEDURE — 80053 COMPREHEN METABOLIC PANEL: CPT

## 2020-06-09 PROCEDURE — 96372 THER/PROPH/DIAG INJ SC/IM: CPT

## 2020-06-09 PROCEDURE — 85025 COMPLETE CBC W/AUTO DIFF WBC: CPT

## 2020-06-09 PROCEDURE — 80061 LIPID PANEL: CPT

## 2020-06-09 PROCEDURE — 94761 N-INVAS EAR/PLS OXIMETRY MLT: CPT

## 2020-06-09 PROCEDURE — 83735 ASSAY OF MAGNESIUM: CPT

## 2020-06-09 RX ADMIN — FLUTICASONE PROPIONATE 2 SPRAY: 50 SPRAY, METERED NASAL at 12:55

## 2020-06-09 RX ADMIN — CARVEDILOL 12.5 MG: 12.5 TABLET, FILM COATED ORAL at 12:54

## 2020-06-09 RX ADMIN — SEVELAMER CARBONATE 800 MG: 800 TABLET, FILM COATED ORAL at 12:54

## 2020-06-09 RX ADMIN — ACETAMINOPHEN 1000 MG: 500 TABLET, FILM COATED ORAL at 04:28

## 2020-06-09 RX ADMIN — SERTRALINE HYDROCHLORIDE 25 MG: 25 TABLET ORAL at 12:54

## 2020-06-09 RX ADMIN — ENOXAPARIN SODIUM 30 MG: 30 INJECTION SUBCUTANEOUS at 12:53

## 2020-06-09 RX ADMIN — INSULIN LISPRO 2 UNITS: 100 INJECTION, SOLUTION INTRAVENOUS; SUBCUTANEOUS at 08:07

## 2020-06-09 RX ADMIN — FAMOTIDINE 10 MG: 20 TABLET ORAL at 12:54

## 2020-06-09 RX ADMIN — ASPIRIN 81 MG: 81 TABLET, COATED ORAL at 12:54

## 2020-06-09 RX ADMIN — SODIUM CHLORIDE, PRESERVATIVE FREE 10 ML: 5 INJECTION INTRAVENOUS at 12:53

## 2020-06-09 RX ADMIN — TIMOLOL MALEATE 1 DROP: 5 SOLUTION/ DROPS OPHTHALMIC at 07:35

## 2020-06-09 RX ADMIN — BRIMONIDINE TARTRATE 1 DROP: 2 SOLUTION OPHTHALMIC at 07:35

## 2020-06-09 RX ADMIN — CETIRIZINE HYDROCHLORIDE 10 MG: 10 TABLET, FILM COATED ORAL at 12:55

## 2020-06-09 RX ADMIN — LOSARTAN POTASSIUM 50 MG: 50 TABLET, FILM COATED ORAL at 12:54

## 2020-06-09 RX ADMIN — IPRATROPIUM BROMIDE AND ALBUTEROL SULFATE 1 AMPULE: .5; 3 SOLUTION RESPIRATORY (INHALATION) at 07:02

## 2020-06-09 RX ADMIN — DORZOLAMIDE HYDROCHLORIDE 1 DROP: 20 SOLUTION/ DROPS OPHTHALMIC at 07:35

## 2020-06-09 ASSESSMENT — PAIN SCALES - GENERAL
PAINLEVEL_OUTOF10: 0
PAINLEVEL_OUTOF10: 0
PAINLEVEL_OUTOF10: 3
PAINLEVEL_OUTOF10: 0

## 2020-06-09 NOTE — PROGRESS NOTES
(FLONASE) 50 MCG/ACT nasal spray 2 spray  2 spray Nasal Daily Hank Perez MD        homatropine 5 % ophthalmic solution 1 drop  1 drop Left Eye Daily Hank Perez MD        insulin glargine (LANTUS) injection vial 10 Units  10 Units Subcutaneous Nightly Hank Perez MD   10 Units at 20    losartan (COZAAR) tablet 50 mg  50 mg Oral BID Hank Perez MD   50 mg at 20    sertraline (ZOLOFT) tablet 25 mg  25 mg Oral Daily Hank Perez MD   25 mg at 20    sevelamer (RENVELA) tablet 800 mg  800 mg Oral Daily Hank Perez MD        timolol (TIMOPTIC) 0.5 % ophthalmic solution 1 drop  1 drop Both Eyes BID Hank Perez MD   1 drop at 20 0735    latanoprost (XALATAN) 0.005 % ophthalmic solution 1 drop  1 drop Both Eyes Nightly Hank Perez MD   1 drop at 20 2351    famotidine (PEPCID) tablet 10 mg  10 mg Oral Daily Hank Perez MD   10 mg at 20 1510    pravastatin (PRAVACHOL) tablet 10 mg  10 mg Oral Nightly Hank Perez MD   10 mg at 20       Past Medical History:  Past Medical History:   Diagnosis Date    Blind right eye     partial vision loss in the L eye too, due to DM    Blindness of one eye     Right     [facility]     CKD [facility]chronic [facility] IV ([facility])     secondary to diabetic nephropathy    Diabetes [facility]     Diabetes mellitus with ophthalmic manifestations     dx'd in  but likely ongoing before that, was dx'd when she went blind in the R eye    Glaucoma     Hemodialysis [facility] ([facility]     dialysis tues, th, sat[address], [facility]    Hypertension     , dx'd at same time as the DM    Obesity     Renal osteodystrophy     Smoker     Type II or unspecified type diabetes mellitus with renal manifestations, uncontrolled(250.42)        Past Surgical History:  Past Surgical History:   Procedure Laterality Date    CARPAL TUNNEL RELEASE      CATARACT REMOVAL       [address] 86, and 1979    CHOLECYSTECTOMY      COLONOSCOPY N/A 3/9/2018    Dr ReevesUcloyz-Jncpxgcwydszis-Fuxfwgmwiwiad AP (-) dysplasia x 1, tubular AP x  (-) dysplasia x 1, HP x 4--1 yr recall    DIALYSIS FISTULA CREATION Left 4/23/15 [facility]    Left proximal ulnar artery to cephalic vein AVF creation    EYE SURGERY      laser, several times     TYMPANOSTOMY TUBE PLACEMENT Bilateral     VASCULAR SURGERY Left 5/11/15 [facility]    US-guided cannulation left distal upper arm cephalic vein with 4-Nepalese glide sheath; LUE AV f'grams with venography SVC; left cephalic vein BAM with [id number] julieth balloon; completion venogram [facility]    VASCULAR SURGERY Left 5/28/15 [facility]    Percutaneous cannulation left distal radial artery with 4-Nepalese glide sheath; LUE AV f'grams with venography SVC; left cephalic vein balloon a'plasty with [id number] julieth balloon and [id number] julieth balloon; proximal and mid upper arm cephalic vein BAM with [id number] julieth balloon; completion venogram [facility]    VASCULAR SURGERY Left 6/15/15 [facility]    US-guided cannulation left cephalic vein with 4-Nepalese and later 7-Nepalese sheath; LUE AV f'grams including venography SVC; left cephalic vein stenosis balloon a'plasty with [id number] cutting balloon; completion f'gram and venogram [facility]    VASCULAR SURGERY  05/15/2017    [facility]. Left upper fistulograms/venograms.  VASCULAR SURGERY  02/14/2018    [facility]. Left upper fistulograms, left subclavian BA 12x40 atlas, left cephalic arch BA 06P79 conquest.    VASCULAR SURGERY  02/13/2019    [facility]. Left upper fistulograms,BA left SCV 8x40 conquest,stent left SCV, viabahn 13x50,left SCV stent BA 12x40 conquest.       Family History  Family History   Problem Relation Age of Onset    Heart Disease Mother     Heart Attack Mother     Glaucoma Sister     Diabetes Sister     Diabetes Brother     Kidney Disease Brother     Blindness Brother     Stroke Maternal Grandmother     Stroke Maternal Grandfather     Colon Cancer

## 2020-06-09 NOTE — CONSULTS
Review of Systems      Constitutional:  No weight loss, no fever/chills  Eyes:  No eye pain, no eye redness  Cardiovascular:  No chest pain, no worsening of edema  Respiratory:  No hemoptysis, no stidor  Gastrointestinal:  No blood in stool, no n/v, no diarrhea  Genitoruinary:  No hematuria, no difficulty with urination  Musculoskeletal:  No joint swelling, no redness  Integumentary:  No Rash, no itching  Neurological:  No focal weakness, No new sensory deficit  Psychiatric:  No depression, no confusion  Endocrine:  No polyuria, no polydipsia         Medications         Current Medication      Current Facility-Administered Medications:     aspirin EC tablet 81 mg, 81 mg, Oral, Daily, Jayshree Brown MD, 81 mg at 05/08/20 0813    carvedilol (COREG) tablet 12.5 mg, 12.5 mg, Oral, BID , Jayshree Brown MD, 12.5 mg at 05/08/20 0813    midodrine (PROAMATINE) tablet 5 mg, 5 mg, Oral, BID , Jayshree Brown MD    timolol (TIMOPTIC) 0.5 % ophthalmic solution 1 drop, 1 drop, Left Eye, BID, Jayshree Brown MD, 1 drop at 05/08/20 4503    sevelamer (RENVELA) tablet 800 mg, 800 mg, Oral, Daily, Jayshree Brown MD, 800 mg at 05/08/20 0813    sertraline (ZOLOFT) tablet 25 mg, 25 mg, Oral, Daily, Jayshree Brown MD, 25 mg at 05/08/20 0815    pravastatin (PRAVACHOL) tablet 10 mg, 10 mg, Oral, Nightly, Jayshree Brown MD, 10 mg at 05/07/20 2334    losartan (COZAAR) tablet 50 mg, 50 mg, Oral, BID, Jayshree Brown MD, 50 mg at 05/08/20 0815    ipratropium-albuterol (DUONEB) nebulizer solution 3 mL, 1 vial, Inhalation, Q4H, Jayshree Brown MD    sodium chloride flush 0.9 % injection 10 mL, 10 mL, Intravenous, 2 times per day, Jayshree Brown MD, 10 mL at 05/08/20 0816    sodium chloride flush 0.9 % injection 10 mL, 10 mL, Intravenous, PRN, Jayshree Brown MD    acetaminophen (TYLENOL) tablet 650 mg, 650 mg, Oral, Q6H PRN, 650 mg at 05/08/20 1385 **OR** acetaminophen (TYLENOL) suppository 650 mg, 650 mg, Rectal, Q6H PRN, Warren Holloway MD    magnesium hydroxide (MILK OF MAGNESIA) 400 MG/5ML suspension 30 mL, 30 mL, Oral, Daily PRN, Warren Holloway MD    promethazine (PHENERGAN) tablet 12.5 mg, 12.5 mg, Oral, Q6H PRN **OR** ondansetron (ZOFRAN) injection 4 mg, 4 mg, Intravenous, Q6H PRN, Warren Holloway MD    enoxaparin (LOVENOX) injection 30 mg, 30 mg, Subcutaneous, Daily, Warren Holloway MD, 30 mg at 05/08/20 0813    ipratropium-albuterol (DUONEB) nebulizer solution 1 ampule, 1 ampule, Inhalation, Q4H WA, Warren Holloway MD, 1 ampule at 05/08/20 0646    insulin lispro (HUMALOG) injection vial 0-12 Units, 0-12 Units, Subcutaneous, Q4H, Warren Holloway MD    insulin lispro (HUMALOG) injection vial 0-6 Units, 0-6 Units, Subcutaneous, Nightly, Warren Holloway MD, 2 Units at 05/07/20 2334    latanoprost (XALATAN) 0.005 % ophthalmic solution 1 drop, 1 drop, Both Eyes, Nightly, Warren Holloway MD, 1 drop at 05/07/20 2334     Active Medications          Outpatient Medications Marked as Taking for the 5/7/20 encounter [facility] HOSPITAL Encounter)   Medication Sig Dispense Refill    dicyclomine (BENTYL) 10 MG capsule Take 1 capsule by mouth 4 times daily 120 capsule 5    losartan (COZAAR) 50 MG tablet TAKE 1 TABLET BY MOUTH TWICE DAILY 180 tablet 0    carvedilol (COREG) 12.5 MG tablet Take 12.5 mg by mouth 2 times daily (with meals)        sertraline (ZOLOFT) 25 MG tablet Take 1 tablet by mouth daily 30 tablet 11    famotidine (PEPCID) 20 MG tablet TAKE 1 TABLET BY MOUTH TWICE DAILY 60 tablet 11    Cholecalciferol (VITAMIN D3) 1000 units CAPS Take by mouth        sevelamer (RENVELA) 800 MG tablet Take 1 tablet by mouth daily         aspirin EC 81 MG EC tablet Take 81 mg by mouth daily        Cetirizine HCl (ZYRTEC ALLERGY PO) Take 10 mg by mouth daily         fluticasone (FLONASE) 50 MCG/ACT nasal spray 2 sprays by Nasal route daily.  To keep ears opened up 1 Bottle 11            Allergies   Bupropion; Sulfa antibiotics; Varenicline; Wellbutrin [bupropion hcl]; Azithromycin; and Penicillins     Family History        Family History         Family History   Problem Relation Age of Onset    Heart Disease Mother      Heart Attack Mother      Glaucoma Sister      Diabetes Sister      Diabetes Brother      Kidney Disease Brother      Blindness Brother      Stroke Maternal Grandmother      Stroke Maternal Grandfather      Colon Cancer Neg Hx      Colon Polyps Neg Hx      Liver Cancer Neg Hx      Liver Disease Neg Hx      Esophageal Cancer Neg Hx      Rectal Cancer Neg Hx      Stomach Cancer Neg Hx           Family history negative for kidney disease.      Social History       Social History   Social History            Socioeconomic History    Marital status:        Spouse name: None    Number of children: None    Years of education: None    Highest education level: None   Occupational History    None   Social Needs    Financial resource strain: None    Food insecurity       Worry: None       Inability: None    Transportation needs       Medical: None       Non-medical: None   Tobacco Use    Smoking status: Current Every Day Smoker       Packs/day: 1.00       Years: 40.00       Pack years: 40.00    Smokeless tobacco: Never Used   Substance and Sexual Activity    Alcohol use: No       Alcohol/week: 0.0 standard drinks    Drug use:  Yes       Types: Marijuana    Sexual activity: None   Lifestyle    Physical activity       Days per week: None       Minutes per session: None    Stress: None   Relationships    Social connections       Talks on phone: None       Gets together: None       Attends Yarsanism service: None       Active member of club or organization: None       Attends meetings of clubs or organizations: None       Relationship status: None    Intimate partner violence       Fear of current or ex partner: None       Emotionally abused: None       Physically abused: None       Forced sexual activity: None

## 2020-06-09 NOTE — DISCHARGE SUMMARY
brimonidine (ALPHAGAN P) 0.15 % ophthalmic solution  Place 1 drop into the left eye 3 times daily              carvedilol (COREG) 12.5 MG tablet  Take 12.5 mg by mouth 2 times daily (with meals)             Cetirizine HCl (ZYRTEC ALLERGY PO)  Take 10 mg by mouth daily              Cholecalciferol (VITAMIN D3) 1000 units CAPS  Take by mouth             dicyclomine (BENTYL) 10 MG capsule  Take 1 capsule by mouth 4 times daily             dorzolamide (TRUSOPT) 2 % ophthalmic solution  Place 1 drop into the left eye 2 times daily              famotidine (PEPCID) 20 MG tablet  TAKE 1 TABLET BY MOUTH TWICE DAILY             fluticasone (FLONASE) 50 MCG/ACT nasal spray  2 sprays by Nasal route daily. To keep ears opened up             FREESTYLE LITE strip  USE TO TEST BLOOD SUGAR TWICE DAILY             Glucose Blood (BLOOD GLUCOSE TEST STRIPS) STRP  Check blood sugar twice a day for recent medication adjustments. homatropine 5 % ophthalmic solution  Place 1 drop into the left eye daily              insulin glargine (LANTUS SOLOSTAR) 100 UNIT/ML injection pen  INJECT 10 UNITS INTO THE SKIN ONCE DAILY             Insulin Syringe-Needle U-100 (ACCUSURE INS SYR 1CC/31GX5/16\") 31G X 5/16\" 1 ML MISC  by Does not apply route.              ipratropium-albuterol (DUONEB) 0.5-2.5 (3) MG/3ML SOLN nebulizer solution  Inhale 3 mLs into the lungs every 4 hours             lactulose (CHRONULAC) 10 GM/15ML solution  TAKE 30 ML BY MOUTH THREE TIMES DAILY             losartan (COZAAR) 50 MG tablet  TAKE 1 TABLET BY MOUTH TWICE DAILY             midodrine (PROAMATINE) 5 MG tablet  TK 1 T PO HALF WAY THROUGH TREATMENT AS DIRECTED             ondansetron (ZOFRAN) 4 MG tablet  Take 4 mg by mouth every 6 hours             pravastatin (PRAVACHOL) 10 MG tablet  TAKE 1 TABLET BY MOUTH EVERY NIGHT AT BEDTIME             sertraline (ZOLOFT) 25 MG tablet  Take 1 tablet by mouth daily             sevelamer (RENVELA) 800 MG tablet  Take 1 tablet by mouth daily              timolol (TIMOPTIC-XR) 0.5 % ophthalmic gel-forming  Place 1 drop into the left eye 2 times daily              Travoprost, ALLISON Free, (TRAVATAN Z) 0.004 % SOLN ophthalmic solution  1 drop nightly                 Discharge Instructions: Follow up with RAMONA Davis in 7 days. Take medications as directed. Resume activity as tolerated. Diet: DIET RENAL; Daily Fluid Restriction: 1800 ml     Disposition: Patient is medically stable and will be discharged Home. Time spent on discharge 35 minutes.     Signed:  Celia Fisher MD 6/9/2020 9:17 AM

## 2020-06-09 NOTE — PLAN OF CARE
Problem: Falls - Risk of:  Goal: Will remain free from falls  Description: Will remain free from falls  Outcome: Ongoing  Goal: Absence of physical injury  Description: Absence of physical injury  Outcome: Ongoing     Problem: Fluid Volume:  Goal: Hemodynamic stability will improve  Description: Hemodynamic stability will improve  Outcome: Ongoing  Goal: Ability to maintain a balanced intake and output will improve  Description: Ability to maintain a balanced intake and output will improve  Outcome: Ongoing     Problem: Health Behavior:  Goal: Identification of resources available to assist in meeting health care needs will improve  Description: Identification of resources available to assist in meeting health care needs will improve  Outcome: Ongoing     Problem: Respiratory:  Goal: Respiratory status will improve  Description: Respiratory status will improve  Outcome: Ongoing     Problem: Discharge Planning:  Goal: Participates in care planning  Description: Participates in care planning  Outcome: Ongoing  Goal: Discharged to appropriate level of care  Description: Discharged to appropriate level of care  Outcome: Ongoing     Problem:  Activity Intolerance:  Goal: Ability to tolerate increased activity will improve  Description: Ability to tolerate increased activity will improve  Outcome: Ongoing     Problem: Pain:  Goal: Pain level will decrease  Description: Pain level will decrease  Outcome: Ongoing  Goal: Ability to notify healthcare provider of pain before it becomes unmanageable or unbearable will improve  Description: Ability to notify healthcare provider of pain before it becomes unmanageable or unbearable will improve  Outcome: Ongoing  Goal: Control of acute pain  Description: Control of acute pain  Outcome: Ongoing  Goal: Control of chronic pain  Description: Control of chronic pain  Outcome: Ongoing     Problem: Serum Glucose Level - Abnormal:  Goal: Ability to maintain appropriate glucose levels will improve to within specified parameters  Description: Ability to maintain appropriate glucose levels will improve to within specified parameters  Outcome: Ongoing     Problem: Skin Integrity - Impaired:  Goal: Will show no infection signs and symptoms  Description: Will show no infection signs and symptoms  Outcome: Ongoing  Goal: Absence of new skin breakdown  Description: Absence of new skin breakdown  Outcome: Ongoing

## 2020-06-10 ENCOUNTER — CARE COORDINATION (OUTPATIENT)
Dept: CASE MANAGEMENT | Age: 60
End: 2020-06-10

## 2020-06-10 NOTE — CARE COORDINATION
[facility] Transitions Initial Follow Up Call    Call within 2 business days of discharge: Yes    Patient: Juno Lancaster Patient : 1960   MRN: [id number]  Reason for Admission:   Discharge Date: 20 RARS: Readmission Risk Score: 17      Last Discharge [facility]       Complaint Diagnosis Description Type Department Provider    20 Shortness of Breath; Leg Swelling Chronic renal failure, unspecified CKD stage . .. ED to Hosp-Admission (Discharged) (ADMITTED) [facility] Deno Fothergill, MD; John Daly. .. Spoke with: Joanna 86: [address]    Non-face-to-face services provided:  Chart review for COVID-19 call     Care Transitions 24 Hour Call    Do you have any ongoing symptoms?:  No  Do you have a copy of your discharge instructions?:  Yes  Do you have all of your prescriptions and are they filled?:  Yes  Have you been contacted by a [facility] Pharmacist?:  No  Have you scheduled your follow up appointment?:  Yes  Were you discharged with any Home Care or Post Acute Services:  No  Do you feel like you have everything you need to keep you well at home?:  Yes  Care Transitions Interventions         Follow Up : Spoke with patient today for initial COVID-19 call after discharge. She says she is doing ok, sometimes SOA. Advised her to try to rest as much as possible since she had just gotten home from hospital, and if symptoms worsen, to call PHP or seek medical care. She is picking up her medicines today, and does not have any questions regarding them. She has her follow up appointment on  with PCP. She says she does not have home oxygen at this time. She says she is eating ok. No s/s of COVID-19 at this time. Did discuss with her what s/s to look for and CDC guidelines. Will follow up with her at a later time. See COVID-19 monitoring below.        Future Appointments   Date Time Provider Darrell Price   2020 11:45 AM [facility]     Patient contacted regarding Taylor Pedersen. Discussed COVID-19 related testing which was available at this time. Test results were negative. Patient informed of results, if available? Yes    Care Transition Nurse/ Ambulatory Care Manager contacted the patient by telephone to perform post discharge assessment. Verified name and  with patient as identifiers. Provided introduction to self, and explanation of the CTN/ACM role, and reason for call due to risk factors for infection and/or exposure to COVID-19. Symptoms reviewed with patient who verbalized the following symptoms: no new symptoms and no worsening symptoms. Due to no new or worsening symptoms encounter was not routed to provider for escalation. Discussed follow-up appointments. If no appointment was previously scheduled, appointment scheduling offered: already scheduled  [facility] follow up appointment(s):   Future Appointments   Date Time Provider Darrell Price   2020 11:45 AM RAMONA Dumont [address] [facility]     [id number] Rosi Hoffman follow up appointment(s):      Patient has following risk factors of: heart failure, diabetes and chronic kidney disease. CTN/ACM reviewed discharge instructions, medical action plan and red flags such as increased shortness of breath, increasing fever and signs of decompensation with patient who verbalized understanding. Discussed exposure protocols and quarantine with CDC Guidelines What to do if you are sick with coronavirus disease .  Patient was given an opportunity for questions and concerns. The patient agrees to contact the [facility] exposure line [phone], local [facility] department [facility]: ([phone]) and PCP office for questions related to their healthcare. CTN/ACM provided contact information for future needs.     Reviewed and educated patient on any new and changed medications related to discharge diagnosis     Patient/family/caregiver

## 2020-06-12 ENCOUNTER — CARE COORDINATION (OUTPATIENT)
Dept: CASE MANAGEMENT | Age: 60
End: 2020-06-12

## 2020-06-12 NOTE — CARE COORDINATION
understanding. Discussed exposure protocols and quarantine from [address] you at higher risk for severe illness 2019 and given an opportunity for questions and concerns. The patient agrees to contact the COVID-19 hotline [phone] or PCP office for questions related to their healthcare. CTN provided contact information for future reference. From CDC: Are you at higher risk for severe illness?  Wash your hands often.  Avoid close contact (6 feet, which is about two arm lengths) with people who are sick.  Put distance between yourself and other people if COVID-19 is spreading in your community.  Clean and disinfect frequently touched surfaces.  Avoid all cruise travel and non-essential air travel.  Call your healthcare professional if you have concerns about COVID-19 and your underlying condition or if you are sick. For more information on steps you can take to protect yourself, see CDC's How to [id number] [place] [address] for follow-up call in 5-7 days based on severity of symptoms and risk factors.         Future Appointments   Date Time Provider Darrell Price   6/17/2020 11:45 AM RAMONA Hagen [place]       Nnamdi Dubose RN

## 2020-06-19 ENCOUNTER — CARE COORDINATION (OUTPATIENT)
Dept: CASE MANAGEMENT | Age: 60
End: 2020-06-19

## 2020-06-19 NOTE — CARE COORDINATION
Discussed exposure protocols and quarantine from [address] you at higher risk for severe illness 2019 and given an opportunity for questions and concerns. The patient agrees to contact the COVID-19 hotline [phone] or PCP office for questions related to their healthcare. CTN/ACM provided contact information for future reference. From CDC: Are you at higher risk for severe illness?  Wash your hands often.  Avoid close contact (6 feet, which is about two arm lengths) with people who are sick.  Put distance between yourself and other people if COVID-19 is spreading in your community.  Clean and disinfect frequently touched surfaces.  Avoid all cruise travel and non-essential air travel.  Call your healthcare professional if you have concerns about COVID-19 and your underlying condition or if you are sick. For more information on steps you can take to protect yourself, see CDC's How to [id number] [place] [address] for follow-up call in 5-7 days based on severity of symptoms and risk factors.     Humaira Spears RN

## 2020-06-22 ENCOUNTER — VIRTUAL VISIT (OUTPATIENT)
Dept: VASCULAR SURGERY | Age: 60
End: 2020-06-22
Payer: MEDICARE

## 2020-06-22 PROCEDURE — 99441 PR PHYS/QHP TELEPHONE EVALUATION 5-10 MIN: CPT | Performed by: NURSE PRACTITIONER

## 2020-06-22 NOTE — PROGRESS NOTES
PO) Take 10 mg by mouth daily       fluticasone (FLONASE) 50 MCG/ACT nasal spray 2 sprays by Nasal route daily. To keep ears opened up 1 Bottle 11    Blood Glucose Monitoring Suppl NIKOS by Does not apply route. Pt will also need new lancet device if not included in kit. 1 Device 0    Glucose Blood (BLOOD GLUCOSE TEST STRIPS) STRP Check blood sugar twice a day for recent medication adjustments. 100 strip 11    bimatoprost 0.01 % SOLN Apply 1 drop to eye nightly. One drop in left eye at hs      Insulin Syringe-Needle U-100 (ACCUSURE INS SYR 1CC/31GX5/16\") 31G X 5/16\" 1 ML MISC by Does not apply route. 100 each 3    timolol (TIMOPTIC-XR) 0.5 % ophthalmic gel-forming Place 1 drop into the left eye 2 times daily       dorzolamide (TRUSOPT) 2 % ophthalmic solution Place 1 drop into the left eye 2 times daily       brimonidine (ALPHAGAN P) 0.15 % ophthalmic solution Place 1 drop into the left eye 3 times daily       homatropine 5 % ophthalmic solution Place 1 drop into the left eye daily        No current facility-administered medications for this visit. Allergies: Bupropion; Sulfa antibiotics; Varenicline; Wellbutrin [bupropion hcl];  Azithromycin; and Penicillins  Past Medical History:   Diagnosis Date    Blind right eye     partial vision loss in the L eye too, due to DM    Blindness of one eye     Right     [facility]est[facility])     CKD (chronic kidney disease), stage IV ([facility])     secondary to diabetic nephropathy    Diabetes ([facility]     Diabetes mellitus with ophthalmic manifestations     dx'd in 2000 but likely ongoing before that, was dx'd when she went blind in the R eye    Glaucoma     [facility]     dialysis tues, thurs, [address], [facility]    Hypertension     2000, dx'd at same time as the DM    Obesity     Renal osteodystrophy     Smoker     Type II or unspecified type diabetes mellitus with renal manifestations, uncontrolled(250.42)      Past Surgical History:   Procedure Laterality Date    CARPAL TUNNEL RELEASE      CATARACT REMOVAL       SECTION      , , and     CHOLECYSTECTOMY      COLONOSCOPY N/A 3/9/2018    Dr ReevesSpwsro-Iidycndeshenyf-Aduiltroctcrg AP (-) dysplasia x 1, tubular AP x  (-) dysplasia x 1, HP x 4--1 yr recall    DIALYSIS FISTULA CREATION Left 4/23/15 [facility]    Left proximal ulnar artery to cephalic vein AVF creation    EYE SURGERY      laser, several times     TYMPANOSTOMY TUBE PLACEMENT Bilateral     VASCULAR SURGERY Left 5/11/15 [facility]    US-guided cannulation left distal upper arm cephalic vein with 4-Burmese glide sheath; LUE AV f'grams with venography SVC; left cephalic vein BAM with [id number] julieth balloon; completion venogram [facility]    VASCULAR SURGERY Left 5/28/15 [facility]    Percutaneous cannulation left distal radial artery with 4-Burmese glide sheath; LUE AV f'grams with venography SVC; left cephalic vein balloon a'plasty with [id number] julieth balloon and [id number] julieth balloon; proximal and mid upper arm cephalic vein BAM with [id number] julieth balloon; completion venogram [facility]    VASCULAR SURGERY Left 6/15/15 [facility]    US-guided cannulation left cephalic vein with 4-Burmese and later 7-Burmese sheath; LUE AV f'grams including venography SVC; left cephalic vein stenosis balloon a'plasty with [id number] cutting balloon; completion f'gram and venogram [facility]    VASCULAR SURGERY  05/15/2017    [facility]. Left upper fistulograms/venograms.  VASCULAR SURGERY  2018    [facility]. Left upper fistulograms, left subclavian BA 12x40 atlas, left cephalic arch BA 82X68 conquest.    VASCULAR SURGERY  2019    [facility]. Left upper fistulograms,BA left SCV 8x40 conquest,stent left SCV, viabahn 13x50,left SCV stent BA 12x40 conquest.     Family History   Problem Relation Age of Onset    Heart Disease Mother     Heart Attack Mother     Glaucoma Sister     Diabetes Sister     Diabetes Brother     Kidney Disease Brother     Blindness

## 2020-06-26 ENCOUNTER — CARE COORDINATION (OUTPATIENT)
Dept: CASE MANAGEMENT | Age: 60
End: 2020-06-26

## 2020-06-27 ENCOUNTER — HOSPITAL ENCOUNTER (EMERGENCY)
Age: 60
Discharge: HOME | End: 2020-06-27
Attending: EMERGENCY MEDICINE
Payer: MEDICARE

## 2020-06-27 VITALS
HEIGHT: 59 IN | RESPIRATION RATE: 21 BRPM | BODY MASS INDEX: 30.24 KG/M2 | WEIGHT: 150 LBS | OXYGEN SATURATION: 94 % | HEART RATE: 77 BPM | TEMPERATURE: 98.2 F | DIASTOLIC BLOOD PRESSURE: 72 MMHG | SYSTOLIC BLOOD PRESSURE: 152 MMHG

## 2020-06-27 LAB
ALBUMIN SERPL-MCNC: 3.7 G/DL (ref 3.5–5.2)
ALP BLD-CCNC: 359 U/L (ref 35–104)
ALT SERPL-CCNC: 19 U/L (ref 5–33)
ANION GAP SERPL CALCULATED.3IONS-SCNC: 16 MMOL/L (ref 7–19)
AST SERPL-CCNC: 24 U/L (ref 5–32)
BASOPHILS ABSOLUTE: 0 K/UL (ref 0–0.2)
BASOPHILS RELATIVE PERCENT: 0.5 % (ref 0–1)
BILIRUB SERPL-MCNC: 0.5 MG/DL (ref 0.2–1.2)
BUN BLDV-MCNC: 21 MG/DL (ref 8–23)
CALCIUM SERPL-MCNC: 8.9 MG/DL (ref 8.8–10.2)
CHLORIDE BLD-SCNC: 84 MMOL/L (ref 98–111)
CO2: 28 MMOL/L (ref 22–29)
CREAT SERPL-MCNC: 3.9 MG/DL (ref 0.5–0.9)
EOSINOPHILS ABSOLUTE: 0.2 K/UL (ref 0–0.6)
EOSINOPHILS RELATIVE PERCENT: 3.8 % (ref 0–5)
GFR NON-AFRICAN AMERICAN: 12
GLUCOSE BLD-MCNC: 216 MG/DL (ref 74–109)
HCT VFR BLD CALC: 38.7 % (ref 37–47)
HEMOGLOBIN: 12 G/DL (ref 12–16)
IMMATURE GRANULOCYTES #: 0 K/UL
LYMPHOCYTES ABSOLUTE: 0.6 K/UL (ref 1.1–4.5)
LYMPHOCYTES RELATIVE PERCENT: 15.9 % (ref 20–40)
MCH RBC QN AUTO: 29.6 PG (ref 27–31)
MCHC RBC AUTO-ENTMCNC: 31 G/DL (ref 33–37)
MCV RBC AUTO: 95.6 FL (ref 81–99)
MONOCYTES ABSOLUTE: 0.3 K/UL (ref 0–0.9)
MONOCYTES RELATIVE PERCENT: 8.3 % (ref 0–10)
NEUTROPHILS ABSOLUTE: 2.8 K/UL (ref 1.5–7.5)
NEUTROPHILS RELATIVE PERCENT: 71.2 % (ref 50–65)
PDW BLD-RTO: 17.9 % (ref 11.5–14.5)
PLATELET # BLD: 190 K/UL (ref 130–400)
PMV BLD AUTO: 9.2 FL (ref 9.4–12.3)
POTASSIUM REFLEX MAGNESIUM: 4 MMOL/L (ref 3.5–5)
RBC # BLD: 4.05 M/UL (ref 4.2–5.4)
SODIUM BLD-SCNC: 128 MMOL/L (ref 136–145)
TOTAL PROTEIN: 6.3 G/DL (ref 6.6–8.7)
WBC # BLD: 4 K/UL (ref 4.8–10.8)

## 2020-06-27 PROCEDURE — 85025 COMPLETE CBC W/AUTO DIFF WBC: CPT

## 2020-06-27 PROCEDURE — 36415 COLL VENOUS BLD VENIPUNCTURE: CPT

## 2020-06-27 PROCEDURE — 99284 EMERGENCY DEPT VISIT MOD MDM: CPT

## 2020-06-27 PROCEDURE — 80053 COMPREHEN METABOLIC PANEL: CPT

## 2020-06-27 ASSESSMENT — ENCOUNTER SYMPTOMS
ABDOMINAL PAIN: 0
DIARRHEA: 1
VOMITING: 0

## 2020-06-27 NOTE — ED PROVIDER NOTES
Hypertension     2000, dx'd at same time as the DM    Obesity     Renal osteodystrophy     Smoker     Type II or unspecified type diabetes mellitus with renal manifestations, uncontrolled(250.42)          SURGICALHISTORY       Past Surgical History:   Procedure Laterality Date    CARPAL TUNNEL RELEASE      CATARACT REMOVAL       SECTION      , , and     CHOLECYSTECTOMY      COLONOSCOPY N/A 3/9/2018    Dr ReevseXtnkza-Hpyqmadclhimlw-Rwsyrxtqiphye AP (-) dysplasia x 1, tubular AP x  (-) dysplasia x 1, HP x 4--1 yr recall    DIALYSIS FISTULA CREATION Left 4/23/[facility]    Left proximal ulnar artery to cephalic vein AVF creation    EYE SURGERY      laser, several times     TYMPANOSTOMY TUBE PLACEMENT Bilateral     VASCULAR SURGERY Left 5/11/[facility]    US-guided cannulation left distal upper arm cephalic vein with 4-Colombian glide sheath; LUE AV f'grams with venography SVC; left cephalic vein BAM with [id number] julieth balloon; completion venogram [facility]    VASCULAR SURGERY Left 5/28/15 [facility]    Percutaneous cannulation left distal radial artery with 4-Colombian glide sheath; LUE AV f'grams with venography SVC; left cephalic vein balloon a'plasty with [id number] julieth balloon and [id number] julieth balloon; proximal and mid upper arm cephalic vein BAM with [id number] julieth balloon; completion venogram [facility]    VASCULAR SURGERY Left 6/15/15 [facility]    US-guided cannulation left cephalic vein with 4-Colombian and later 7-Colombian sheath; LUE AV f'grams including venography SVC; left cephalic vein stenosis balloon a'plasty with 5nyn15eh cutting balloon; completion f'gram and venogram [facility]    VASCULAR SURGERY  05/15/2017    [facility]. Left upper fistulograms/venograms.  VASCULAR SURGERY  2018    [facility] Left upper fistulograms, left subclavian BA 12x40 atlas, left cephalic arch BA 14U14 conquest.    VASCULAR SURGERY  2019    [facility] Left upper fistulograms,BA left SCV 8x40 conquest,stent left SCV, viabahn 13x50,left SCV stent BA 12x40 [facility].         CURRENT MEDICATIONS       Discharge Medication List as of 6/27/2020  2:28 PM      CONTINUE these medications which have NOT CHANGED    Details   ondansetron (ZOFRAN) 4 MG tablet Take 4 mg by mouth every 6 hoursHistorical Med      ipratropium-albuterol (DUONEB) 0.5-2.5 (3) MG/3ML SOLN nebulizer solution Inhale 3 mLs into the lungs every 4 hours, Disp-360 mL, R-1Normal      albuterol sulfate HFA (PROVENTIL HFA) 108 (90 Base) MCG/ACT inhaler Inhale 2 puffs into the lungs every 6 hours as needed for Wheezing, Disp-1 Inhaler, R-3Normal      dicyclomine (BENTYL) 10 MG capsule Take 1 capsule by mouth 4 times daily, Disp-120 capsule, R-5Normal      losartan (COZAAR) 50 MG tablet TAKE 1 TABLET BY MOUTH TWICE DAILY, Disp-180 tablet, R-0**Patient requests 90 days supply**Normal      B-D ULTRAFINE III SHORT PEN 31G X 8 MM MISC Disp-100 each, R-3, Normal      carvedilol (COREG) 12.5 MG tablet Take 12.5 mg by mouth 2 times daily (with meals)Historical Med      insulin glargine (LANTUS SOLOSTAR) 100 UNIT/ML injection pen INJECT 10 UNITS INTO THE SKIN ONCE DAILY, Disp-15 mL, R-0Normal      sertraline (ZOLOFT) 25 MG tablet Take 1 tablet by mouth daily, Disp-30 tablet, R-11Normal      lactulose (CHRONULAC) 10 GM/15ML solution TAKE 30 ML BY MOUTH THREE TIMES DAILY, Disp-8108 mL, R-1**Patient requests 90 days supply**Normal      famotidine (PEPCID) 20 MG tablet TAKE 1 TABLET BY MOUTH TWICE DAILY, Disp-60 tablet, R-11Normal      pravastatin (PRAVACHOL) 10 MG tablet TAKE 1 TABLET BY MOUTH EVERY NIGHT AT BEDTIME, Disp-30 tablet, R-11Normal      Cholecalciferol (VITAMIN D3) 1000 units CAPS Take by mouthHistorical Med      Travoprost, BAK Free, (TRAVATAN Z) 0.004 % SOLN ophthalmic solution 1 drop nightlyHistorical Med      midodrine (PROAMATINE) 5 MG tablet TK 1 T PO HALF WAY THROUGH TREATMENT AS DIRECTEDHistorical Med      sevelamer (RENVELA) 800 MG tablet Take 1 tablet by mouth daily Historical Med Constitutional:       General: She is not in acute distress. Appearance: She is not ill-appearing, toxic-appearing or diaphoretic. HENT:      Nose: Nose normal.      Mouth/Throat:      Mouth: Mucous membranes are moist.      Pharynx: Oropharynx is clear. Eyes:      Conjunctiva/sclera: Conjunctivae normal.   Neck:      Musculoskeletal: Normal range of motion and neck supple. Cardiovascular:      Rate and Rhythm: Normal rate and regular rhythm. Heart sounds: Normal heart sounds. Pulmonary:      Effort: Pulmonary effort is normal.      Breath sounds: Normal breath sounds. Abdominal:      Tenderness: There is no abdominal tenderness. Skin:     General: Skin is warm and dry. Capillary Refill: Capillary refill takes less than 2 seconds. Neurological:      General: No focal deficit present. Mental Status: She is alert and oriented to person, place, and time.    Psychiatric:         Mood and Affect: Mood normal.         DIAGNOSTIC RESULTS     EKG: All EKG's are interpreted by the Emergency Department Physician who either signs or Co-signsthis chart in the absence of a cardiologist.        RADIOLOGY:   Elia Grief such as CT, Ultrasound and MRI are read by the radiologist. Plain radiographic images are visualized and preliminarily interpreted by the emergency physician with the below findings:        Interpretation per the Radiologist below, if available at the time ofthis note:    No orders to display         ED BEDSIDE ULTRASOUND:   Performed by ED Physician - none    LABS:  Labs Reviewed   CBC WITH AUTO DIFFERENTIAL - Abnormal; Notable for the following components:       Result Value    WBC 4.0 (*)     RBC 4.05 (*)     MCHC 31.0 (*)     RDW 17.9 (*)     MPV 9.2 (*)     Neutrophils % 71.2 (*)     Lymphocytes % 15.9 (*)     Lymphocytes Absolute 0.6 (*)     All other components within normal limits   COMPREHENSIVE METABOLIC PANEL W/ REFLEX TO MG FOR LOW K - Abnormal; Notable for the following components:    Sodium 128 (*)     Chloride 84 (*)     Glucose 216 (*)     CREATININE 3.9 (*)     GFR Non- 12 (*)     Total Protein 6.3 (*)     Alkaline Phosphatase 359 (*)     All other components within normal limits       All other labs were within normal range or not returned as of this dictation. EMERGENCY DEPARTMENT COURSE and DIFFERENTIAL DIAGNOSIS/MDM:   Vitals:    Vitals:    06/27/20 1122 06/27/20 1202 06/27/20 1439   BP: (!) 151/69 (!) 152/72    Pulse: 78 78 77   Resp: 21     Temp: 96.4 °F (35.8 °C)  98.2 °F (36.8 °C)   TempSrc: Oral  Oral   SpO2: 95% 94%    Weight: 150 lb (68 kg)     Height: 4' 11\" (1.499 m)             MDM  Number of Diagnoses or Management Options  Diagnosis management comments: No stool in 3 hr ED stay. Resting comfortably at d/c. CRITICAL CARE TIME   Total Critical Care time was 0 minutes, excluding separately reportable procedures. There was a high probability of clinically significant/lifethreatening deterioration in the patient's condition which required my urgent intervention. CONSULTS:  None    PROCEDURES:  Unless otherwise noted below, none     Procedures    FINAL IMPRESSION      1.  Diarrhea, unspecified type          DISPOSITION/PLAN   DISPOSITION        PATIENT REFERRED TO:  Cherylynn Runner[address]  [phone]      As needed      DISCHARGE MEDICATIONS:  Discharge Medication List as of 6/27/2020  2:28 PM             (Please note that portions of this note were completed with a voice recognition program.  Efforts were made to edit the dictations but occasionally words are mis-transcribed.)    Marco Billingsley MD (electronically signed)  Attending Emergency Physician          Marco Billingsley MD  06/27/20 9945

## 2020-06-29 ENCOUNTER — CARE COORDINATION (OUTPATIENT)
Dept: CASE MANAGEMENT | Age: 60
End: 2020-06-29

## 2020-06-29 RX ORDER — PRAVASTATIN SODIUM 10 MG
TABLET ORAL
Qty: 30 TABLET | Refills: 11 | Status: SHIPPED | OUTPATIENT
Start: 2020-06-29

## 2020-06-29 NOTE — TELEPHONE ENCOUNTER
Blanche Grace called to request a refill on her medication.       Last office visit : 5/20/2020   Next office visit : Visit date not found     Requested Prescriptions     Pending Prescriptions Disp Refills    pravastatin (PRAVACHOL) 10 MG tablet 30 tablet 11     Sig: TAKE 1 TABLET BY MOUTH EVERY NIGHT AT BEDTIME            Damian Oconnor

## 2020-06-30 ENCOUNTER — CARE COORDINATION (OUTPATIENT)
Dept: CASE MANAGEMENT | Age: 60
End: 2020-06-30

## 2020-06-30 NOTE — CARE COORDINATION
[facility] Trans[facility] Follow Up Call    2020    Patient: Viraj Galindo  Patient : 1960   MRN: [id number]  Reason for Admission:   Discharge Date: 20 RARS: Readmission Risk Score: 16         Spoke with: Jimmy 374 Transitions Subsequent and Final Call    Subsequent and Final Calls  Do you have any ongoing symptoms?:  No  Have your medications changed?:  No  Do you have any questions related to your medications?:  No  Do you currently have any active services?:  No  Do you have any needs or concerns that I can assist you with?:  No  Identified Barriers:  None  Care Transitions Interventions  Other Interventions: Follow Up : Spoke with patient for ED follow up call for COVID-19. Patient says she is feeling much better, diarrhea has stopped. She says she just takes it day by day. She says appetite is good. No signs or symptoms of COVID-19 and she is aware of CDC guidelines, good handwashing, social distancing and keeping high traffic areas cleaned and disinfected. Will follow up with her at a late time. No future appointments. Patient contacted regarding COVID-19 risk and screening. Discussed COVID-19 related testing which was not done at this time. Test results were not done. Patient informed of results, if available? No.    Care Transition Nurse/ Ambulatory Care Manager contacted the patient by telephone to perform follow-up assessment. Verified name and  with patient as identifiers. Patient has following risk factors of: diabetes. Symptoms reviewed with patient who verbalized the following symptoms: no new symptoms and no worsening symptoms. Due to no new or worsening symptoms encounter was not routed to provider for escalation. Education provided regarding infection prevention, and signs and symptoms of COVID-19 and when to seek medical attention with patient who verbalized understanding.  Discussed exposure protocols and quarantine from CDC Guidelines Are you at higher risk for severe illness 2019 and given an opportunity for questions and concerns. The patient agrees to contact the COVID-19 hotline [phone] or PCP office for questions related to their healthcare. CTN/ACM provided contact information for future reference. From CDC: Are you at higher risk for severe illness?  Wash your hands often.  Avoid close contact (6 feet, which is about two arm lengths) with people who are sick.  Put distance between yourself and other people if COVID-19 is spreading in your community.  Clean and disinfect frequently touched surfaces.  Avoid all cruise travel and non-essential air travel.  Call your healthcare professional if you have concerns about COVID-19 and your underlying condition or if you are sick. For more information on steps you can take to protect yourself, see CDC's How to [id number] [place] [address] for follow-up call in 5-7 days based on severity of symptoms and risk factors.     Kaye Dumont RN

## 2020-07-01 RX ORDER — LOSARTAN POTASSIUM 50 MG/1
TABLET ORAL
Qty: 180 TABLET | Refills: 0 | Status: SHIPPED | OUTPATIENT
Start: 2020-07-01 | End: 2020-09-25

## 2020-07-07 ENCOUNTER — CARE COORDINATION (OUTPATIENT)
Dept: CASE MANAGEMENT | Age: 60
End: 2020-07-07

## 2020-07-07 NOTE — CARE COORDINATION
[address] [facility] ED Follow Up Call    2020    Patient: Madonna Govea Patient : 1960   MRN: [id number]  Reason for Admission: Diarrhea  Discharge Date: 2020     Attempt #2 to make contact with patient for COVID-19 ED follow up with no answer. Unable to leave message with intent of call or CTN contact information. Will resolve COVID-19 calls at this time.   Care Transitions ED Follow Up    Care Transitions Interventions  Do you have all of your prescriptions and are they filled?:  No

## 2020-07-11 ENCOUNTER — HOSPITAL ENCOUNTER (INPATIENT)
Age: 60
LOS: 1 days | Discharge: HOME | DRG: 640 | End: 2020-07-12
Attending: EMERGENCY MEDICINE | Admitting: FAMILY MEDICINE
Payer: MEDICARE

## 2020-07-11 ENCOUNTER — APPOINTMENT (OUTPATIENT)
Dept: GENERAL RADIOLOGY | Age: 60
DRG: 640 | End: 2020-07-11
Payer: MEDICARE

## 2020-07-11 PROBLEM — R79.89 ELEVATED TROPONIN: Status: ACTIVE | Noted: 2020-07-11

## 2020-07-11 PROBLEM — J90 PLEURAL EFFUSION ON RIGHT: Status: ACTIVE | Noted: 2020-07-11

## 2020-07-11 PROBLEM — R77.8 ELEVATED TROPONIN: Status: ACTIVE | Noted: 2020-07-11

## 2020-07-11 PROBLEM — E87.1 HYPONATREMIA: Status: ACTIVE | Noted: 2020-07-11

## 2020-07-11 PROBLEM — R74.8 ALKALINE PHOSPHATASE ELEVATION: Status: ACTIVE | Noted: 2020-07-11

## 2020-07-11 PROBLEM — R94.31 QT PROLONGATION: Status: ACTIVE | Noted: 2020-07-11

## 2020-07-11 LAB
ALBUMIN SERPL-MCNC: 3.7 G/DL (ref 3.5–5.2)
ALP BLD-CCNC: 465 U/L (ref 35–104)
ALT SERPL-CCNC: 23 U/L (ref 5–33)
ANION GAP SERPL CALCULATED.3IONS-SCNC: 16 MMOL/L (ref 7–19)
AST SERPL-CCNC: 28 U/L (ref 5–32)
BASOPHILS ABSOLUTE: 0 K/UL (ref 0–0.2)
BASOPHILS RELATIVE PERCENT: 0.6 % (ref 0–1)
BILIRUB SERPL-MCNC: 0.7 MG/DL (ref 0.2–1.2)
BUN BLDV-MCNC: 26 MG/DL (ref 8–23)
CALCIUM SERPL-MCNC: 9.3 MG/DL (ref 8.8–10.2)
CHLORIDE BLD-SCNC: 87 MMOL/L (ref 98–111)
CO2: 27 MMOL/L (ref 22–29)
CREAT SERPL-MCNC: 4.8 MG/DL (ref 0.5–0.9)
EOSINOPHILS ABSOLUTE: 0.2 K/UL (ref 0–0.6)
EOSINOPHILS RELATIVE PERCENT: 3.1 % (ref 0–5)
GFR NON-AFRICAN AMERICAN: 9
GLUCOSE BLD-MCNC: 240 MG/DL (ref 74–109)
GLUCOSE BLD-MCNC: 276 MG/DL (ref 70–99)
HCT VFR BLD CALC: 37.5 % (ref 37–47)
HEMOGLOBIN: 11.7 G/DL (ref 12–16)
IMMATURE GRANULOCYTES #: 0 K/UL
LYMPHOCYTES ABSOLUTE: 0.8 K/UL (ref 1.1–4.5)
LYMPHOCYTES RELATIVE PERCENT: 16.1 % (ref 20–40)
MCH RBC QN AUTO: 29 PG (ref 27–31)
MCHC RBC AUTO-ENTMCNC: 31.2 G/DL (ref 33–37)
MCV RBC AUTO: 92.8 FL (ref 81–99)
MONOCYTES ABSOLUTE: 0.5 K/UL (ref 0–0.9)
MONOCYTES RELATIVE PERCENT: 8.7 % (ref 0–10)
NEUTROPHILS ABSOLUTE: 3.7 K/UL (ref 1.5–7.5)
NEUTROPHILS RELATIVE PERCENT: 71.1 % (ref 50–65)
PDW BLD-RTO: 17.2 % (ref 11.5–14.5)
PERFORMED ON: ABNORMAL
PLATELET # BLD: 202 K/UL (ref 130–400)
PMV BLD AUTO: 9.2 FL (ref 9.4–12.3)
POTASSIUM REFLEX MAGNESIUM: 3.8 MMOL/L (ref 3.5–5)
PRO-BNP: ABNORMAL PG/ML (ref 0–900)
RBC # BLD: 4.04 M/UL (ref 4.2–5.4)
SODIUM BLD-SCNC: 130 MMOL/L (ref 136–145)
TOTAL PROTEIN: 6.4 G/DL (ref 6.6–8.7)
TROPONIN: 0.06 NG/ML (ref 0–0.03)
WBC # BLD: 5.2 K/UL (ref 4.8–10.8)

## 2020-07-11 PROCEDURE — 1210000000 HC MED SURG R&B

## 2020-07-11 PROCEDURE — 6370000000 HC RX 637 (ALT 250 FOR IP): Performed by: FAMILY MEDICINE

## 2020-07-11 PROCEDURE — 85025 COMPLETE CBC W/AUTO DIFF WBC: CPT

## 2020-07-11 PROCEDURE — 83880 ASSAY OF NATRIURETIC PEPTIDE: CPT

## 2020-07-11 PROCEDURE — 6360000002 HC RX W HCPCS: Performed by: FAMILY MEDICINE

## 2020-07-11 PROCEDURE — 36415 COLL VENOUS BLD VENIPUNCTURE: CPT

## 2020-07-11 PROCEDURE — 8010000000 HC HEMODIALYSIS ACUTE INPT

## 2020-07-11 PROCEDURE — 84484 ASSAY OF TROPONIN QUANT: CPT

## 2020-07-11 PROCEDURE — 71045 X-RAY EXAM CHEST 1 VIEW: CPT

## 2020-07-11 PROCEDURE — 94640 AIRWAY INHALATION TREATMENT: CPT

## 2020-07-11 PROCEDURE — 82947 ASSAY GLUCOSE BLOOD QUANT: CPT

## 2020-07-11 PROCEDURE — 2580000003 HC RX 258: Performed by: FAMILY MEDICINE

## 2020-07-11 PROCEDURE — 99285 EMERGENCY DEPT VISIT HI MDM: CPT

## 2020-07-11 PROCEDURE — 93005 ELECTROCARDIOGRAM TRACING: CPT | Performed by: EMERGENCY MEDICINE

## 2020-07-11 PROCEDURE — 5A1D70Z PERFORMANCE OF URINARY FILTRATION, INTERMITTENT, LESS THAN 6 HOURS PER DAY: ICD-10-PCS | Performed by: INTERNAL MEDICINE

## 2020-07-11 PROCEDURE — 80053 COMPREHEN METABOLIC PANEL: CPT

## 2020-07-11 RX ORDER — PRAVASTATIN SODIUM 10 MG
10 TABLET ORAL NIGHTLY
Status: DISCONTINUED | OUTPATIENT
Start: 2020-07-11 | End: 2020-07-12 | Stop reason: HOSPADM

## 2020-07-11 RX ORDER — SEVELAMER CARBONATE 800 MG/1
800 TABLET, FILM COATED ORAL DAILY
Status: DISCONTINUED | OUTPATIENT
Start: 2020-07-11 | End: 2020-07-12 | Stop reason: HOSPADM

## 2020-07-11 RX ORDER — LATANOPROST 50 UG/ML
1 SOLUTION/ DROPS OPHTHALMIC NIGHTLY
Status: DISCONTINUED | OUTPATIENT
Start: 2020-07-11 | End: 2020-07-12 | Stop reason: HOSPADM

## 2020-07-11 RX ORDER — ASPIRIN 81 MG/1
81 TABLET ORAL DAILY
Status: DISCONTINUED | OUTPATIENT
Start: 2020-07-11 | End: 2020-07-12 | Stop reason: HOSPADM

## 2020-07-11 RX ORDER — POLYETHYLENE GLYCOL 3350 17 G/17G
17 POWDER, FOR SOLUTION ORAL DAILY PRN
Status: DISCONTINUED | OUTPATIENT
Start: 2020-07-11 | End: 2020-07-12 | Stop reason: HOSPADM

## 2020-07-11 RX ORDER — CETIRIZINE HYDROCHLORIDE 10 MG/1
10 TABLET ORAL DAILY
Status: DISCONTINUED | OUTPATIENT
Start: 2020-07-11 | End: 2020-07-12 | Stop reason: HOSPADM

## 2020-07-11 RX ORDER — IPRATROPIUM BROMIDE AND ALBUTEROL SULFATE 2.5; .5 MG/3ML; MG/3ML
1 SOLUTION RESPIRATORY (INHALATION) 4 TIMES DAILY
Status: DISCONTINUED | OUTPATIENT
Start: 2020-07-11 | End: 2020-07-12 | Stop reason: HOSPADM

## 2020-07-11 RX ORDER — ACETAMINOPHEN 325 MG/1
650 TABLET ORAL EVERY 6 HOURS PRN
Status: DISCONTINUED | OUTPATIENT
Start: 2020-07-11 | End: 2020-07-12 | Stop reason: HOSPADM

## 2020-07-11 RX ORDER — LACTULOSE 10 G/15ML
20 SOLUTION ORAL 3 TIMES DAILY
Status: DISCONTINUED | OUTPATIENT
Start: 2020-07-11 | End: 2020-07-12 | Stop reason: HOSPADM

## 2020-07-11 RX ORDER — VITAMIN B COMPLEX
1000 TABLET ORAL DAILY
Status: DISCONTINUED | OUTPATIENT
Start: 2020-07-11 | End: 2020-07-12 | Stop reason: HOSPADM

## 2020-07-11 RX ORDER — MIDODRINE HYDROCHLORIDE 5 MG/1
5 TABLET ORAL SEE ADMIN INSTRUCTIONS
Status: DISCONTINUED | OUTPATIENT
Start: 2020-07-11 | End: 2020-07-12 | Stop reason: HOSPADM

## 2020-07-11 RX ORDER — LOSARTAN POTASSIUM 50 MG/1
50 TABLET ORAL 2 TIMES DAILY
Status: DISCONTINUED | OUTPATIENT
Start: 2020-07-11 | End: 2020-07-12 | Stop reason: HOSPADM

## 2020-07-11 RX ORDER — ONDANSETRON 2 MG/ML
4 INJECTION INTRAMUSCULAR; INTRAVENOUS EVERY 6 HOURS PRN
Status: DISCONTINUED | OUTPATIENT
Start: 2020-07-11 | End: 2020-07-12 | Stop reason: HOSPADM

## 2020-07-11 RX ORDER — TIMOLOL MALEATE 5 MG/ML
1 SOLUTION/ DROPS OPHTHALMIC 2 TIMES DAILY
Status: DISCONTINUED | OUTPATIENT
Start: 2020-07-11 | End: 2020-07-12 | Stop reason: HOSPADM

## 2020-07-11 RX ORDER — SODIUM CHLORIDE 0.9 % (FLUSH) 0.9 %
10 SYRINGE (ML) INJECTION PRN
Status: DISCONTINUED | OUTPATIENT
Start: 2020-07-11 | End: 2020-07-12 | Stop reason: HOSPADM

## 2020-07-11 RX ORDER — NICOTINE POLACRILEX 4 MG
15 LOZENGE BUCCAL PRN
Status: DISCONTINUED | OUTPATIENT
Start: 2020-07-11 | End: 2020-07-12 | Stop reason: HOSPADM

## 2020-07-11 RX ORDER — CARVEDILOL 12.5 MG/1
12.5 TABLET ORAL 2 TIMES DAILY WITH MEALS
Status: DISCONTINUED | OUTPATIENT
Start: 2020-07-11 | End: 2020-07-12 | Stop reason: HOSPADM

## 2020-07-11 RX ORDER — SODIUM CHLORIDE 0.9 % (FLUSH) 0.9 %
10 SYRINGE (ML) INJECTION EVERY 12 HOURS SCHEDULED
Status: DISCONTINUED | OUTPATIENT
Start: 2020-07-11 | End: 2020-07-12 | Stop reason: HOSPADM

## 2020-07-11 RX ORDER — FAMOTIDINE 20 MG/1
1 TABLET, FILM COATED ORAL 2 TIMES DAILY
Status: DISCONTINUED | OUTPATIENT
Start: 2020-07-11 | End: 2020-07-11 | Stop reason: DRUGHIGH

## 2020-07-11 RX ORDER — FAMOTIDINE 20 MG/1
10 TABLET, FILM COATED ORAL DAILY
Status: DISCONTINUED | OUTPATIENT
Start: 2020-07-11 | End: 2020-07-12 | Stop reason: HOSPADM

## 2020-07-11 RX ORDER — HOMATROPINE HYDROBROMIDE OPHTHALMIC 50 MG/ML
1 SOLUTION OPHTHALMIC DAILY
Status: DISCONTINUED | OUTPATIENT
Start: 2020-07-11 | End: 2020-07-12 | Stop reason: HOSPADM

## 2020-07-11 RX ORDER — DEXTROSE MONOHYDRATE 50 MG/ML
100 INJECTION, SOLUTION INTRAVENOUS PRN
Status: DISCONTINUED | OUTPATIENT
Start: 2020-07-11 | End: 2020-07-12 | Stop reason: HOSPADM

## 2020-07-11 RX ORDER — INSULIN GLARGINE 100 [IU]/ML
10 INJECTION, SOLUTION SUBCUTANEOUS NIGHTLY
Status: DISCONTINUED | OUTPATIENT
Start: 2020-07-11 | End: 2020-07-12 | Stop reason: HOSPADM

## 2020-07-11 RX ORDER — HEPARIN SODIUM 5000 [USP'U]/ML
5000 INJECTION, SOLUTION INTRAVENOUS; SUBCUTANEOUS EVERY 8 HOURS SCHEDULED
Status: DISCONTINUED | OUTPATIENT
Start: 2020-07-11 | End: 2020-07-12 | Stop reason: HOSPADM

## 2020-07-11 RX ORDER — PROMETHAZINE HYDROCHLORIDE 25 MG/1
12.5 TABLET ORAL EVERY 6 HOURS PRN
Status: DISCONTINUED | OUTPATIENT
Start: 2020-07-11 | End: 2020-07-12 | Stop reason: HOSPADM

## 2020-07-11 RX ORDER — BRIMONIDINE TARTRATE 2 MG/ML
1 SOLUTION/ DROPS OPHTHALMIC 3 TIMES DAILY
Status: DISCONTINUED | OUTPATIENT
Start: 2020-07-11 | End: 2020-07-12 | Stop reason: HOSPADM

## 2020-07-11 RX ORDER — DEXTROSE MONOHYDRATE 25 G/50ML
12.5 INJECTION, SOLUTION INTRAVENOUS PRN
Status: DISCONTINUED | OUTPATIENT
Start: 2020-07-11 | End: 2020-07-12 | Stop reason: HOSPADM

## 2020-07-11 RX ORDER — ACETAMINOPHEN 650 MG/1
650 SUPPOSITORY RECTAL EVERY 6 HOURS PRN
Status: DISCONTINUED | OUTPATIENT
Start: 2020-07-11 | End: 2020-07-12 | Stop reason: HOSPADM

## 2020-07-11 RX ORDER — SERTRALINE HYDROCHLORIDE 25 MG/1
25 TABLET, FILM COATED ORAL DAILY
Status: DISCONTINUED | OUTPATIENT
Start: 2020-07-11 | End: 2020-07-12 | Stop reason: HOSPADM

## 2020-07-11 RX ORDER — FLUTICASONE PROPIONATE 50 MCG
2 SPRAY, SUSPENSION (ML) NASAL DAILY
Status: DISCONTINUED | OUTPATIENT
Start: 2020-07-11 | End: 2020-07-12 | Stop reason: HOSPADM

## 2020-07-11 RX ORDER — FUROSEMIDE 10 MG/ML
40 INJECTION INTRAMUSCULAR; INTRAVENOUS 2 TIMES DAILY
Status: DISCONTINUED | OUTPATIENT
Start: 2020-07-11 | End: 2020-07-12 | Stop reason: HOSPADM

## 2020-07-11 RX ORDER — DORZOLAMIDE HCL 20 MG/ML
1 SOLUTION/ DROPS OPHTHALMIC 2 TIMES DAILY
Status: DISCONTINUED | OUTPATIENT
Start: 2020-07-11 | End: 2020-07-12 | Stop reason: HOSPADM

## 2020-07-11 RX ADMIN — HEPARIN SODIUM 5000 UNITS: 5000 INJECTION INTRAVENOUS; SUBCUTANEOUS at 21:20

## 2020-07-11 RX ADMIN — FAMOTIDINE 10 MG: 20 TABLET ORAL at 21:13

## 2020-07-11 RX ADMIN — CARVEDILOL 12.5 MG: 12.5 TABLET, FILM COATED ORAL at 21:13

## 2020-07-11 RX ADMIN — INSULIN LISPRO 3 UNITS: 100 INJECTION, SOLUTION INTRAVENOUS; SUBCUTANEOUS at 21:24

## 2020-07-11 RX ADMIN — FUROSEMIDE 40 MG: 10 INJECTION, SOLUTION INTRAMUSCULAR; INTRAVENOUS at 21:13

## 2020-07-11 RX ADMIN — DORZOLAMIDE HYDROCHLORIDE 1 DROP: 20 SOLUTION/ DROPS OPHTHALMIC at 21:12

## 2020-07-11 RX ADMIN — BRIMONIDINE TARTRATE 1 DROP: 2 SOLUTION OPHTHALMIC at 21:12

## 2020-07-11 RX ADMIN — SODIUM CHLORIDE, PRESERVATIVE FREE 10 ML: 5 INJECTION INTRAVENOUS at 21:13

## 2020-07-11 RX ADMIN — PRAVASTATIN SODIUM 10 MG: 10 TABLET ORAL at 21:13

## 2020-07-11 RX ADMIN — IPRATROPIUM BROMIDE AND ALBUTEROL SULFATE 3 ML: .5; 3 SOLUTION RESPIRATORY (INHALATION) at 20:24

## 2020-07-11 RX ADMIN — LATANOPROST 1 DROP: 50 SOLUTION OPHTHALMIC at 21:12

## 2020-07-11 RX ADMIN — TIMOLOL MALEATE 1 DROP: 5 SOLUTION/ DROPS OPHTHALMIC at 21:12

## 2020-07-11 RX ADMIN — LOSARTAN POTASSIUM 50 MG: 50 TABLET ORAL at 21:19

## 2020-07-11 RX ADMIN — FLUTICASONE PROPIONATE 2 SPRAY: 50 SPRAY, METERED NASAL at 21:12

## 2020-07-11 RX ADMIN — INSULIN GLARGINE 10 UNITS: 100 INJECTION, SOLUTION SUBCUTANEOUS at 21:20

## 2020-07-11 RX ADMIN — CETIRIZINE HYDROCHLORIDE 10 MG: 10 TABLET, FILM COATED ORAL at 21:13

## 2020-07-11 ASSESSMENT — ENCOUNTER SYMPTOMS
WHEEZING: 1
CONSTIPATION: 0
BLOOD IN STOOL: 0
SORE THROAT: 0
CHOKING: 0
EYE DISCHARGE: 0
NAUSEA: 0
VOICE CHANGE: 0
FACIAL SWELLING: 0
SINUS PRESSURE: 0
APNEA: 0
SHORTNESS OF BREATH: 1
DIARRHEA: 0

## 2020-07-11 ASSESSMENT — PAIN SCALES - GENERAL
PAINLEVEL_OUTOF10: 7
PAINLEVEL_OUTOF10: 0

## 2020-07-11 NOTE — ED NOTES
Bed: 08  Expected date:   Expected time:   Means of arrival:   Comments:  Dialysis Pt [address] D Mike Soria RN  07/11/20 3650

## 2020-07-11 NOTE — ED PROVIDER NOTES
[facility] EMERGENCY DEPT  eMERGENCY dEPARTMENT eNCOUnter      Pt Name: Sunshine Guo  MRN: [id number]  [place] 1960  Date of evaluation: 7/11/2020  Provider: MD Maddison Fernandes       Chief Complaint   Patient presents with    Shortness of Breath     unable to dialysis         HISTORY OF PRESENT ILLNESS   (Location/Symptom, Timing/Onset,Context/Setting, Quality, Duration, Modifying Factors, Severity)  Note limiting factors. Sunshine Guo is a 56 y.o. female who presents to the emergency department evaluation of shortness of breath    56year-old dialysis patient here for shortness of breath increased weight. She dialyzes with use of fistula in her left upper extremity on Tuesday Thursdays and Saturdays. She dialyzed Thursday but was unable to get all the fluid weight off. The day she presented at her dialysis clinic was determined too ill to be dialyzed and referred to the ED. She is complaining of leg swelling and tenderness she is not complaining of chest pain or pain with breathing. She is a smoker and states she has a history of some COPD. She denies fever. No change in cough. The history is provided by the patient and medical records. NursingNotes were reviewed. REVIEW OF SYSTEMS    (2-9 systems for level 4, 10 or more for level 5)     Review of Systems   Constitutional: Negative for chills and fever. HENT: Negative for congestion, drooling, facial swelling, nosebleeds, sinus pressure, sore throat and voice change. Eyes: Negative for discharge. Respiratory: Positive for shortness of breath and wheezing. Negative for apnea and choking. Cardiovascular: Negative for chest pain and leg swelling. Gastrointestinal: Negative for blood in stool, constipation, diarrhea and nausea. Genitourinary: Negative for dysuria and enuresis. Still makes some urine   Musculoskeletal: Negative for joint swelling. Skin: Negative for rash and wound.    Neurological: Negative for seizures and syncope. Psychiatric/Behavioral: Negative for behavioral problems, hallucinations and suicidal ideas. All other systems reviewed and are negative. A complete review of systems was performed and is negative except as noted above in the HPI.        PAST MEDICAL HISTORY     Past Medical History:   Diagnosis Date    Blind right eye     partial vision loss in the L eye too, due to DM    Blindness of one eye     Right     CHF (congestive heart failure) (HCC)     CKD (chronic kidney disease), stage IV (HCC)     secondary to diabetic nephropathy    Diabetes ([facility] [address]     Diabetes mellitus with ophthalmic manifestations     dx'd in 2000 but likely ongoing before that, was dx'd when she went blind in the R eye    Glaucoma     Hemodialysis patient ([facility] [address]     dialysis tues, thurs, sat[address], [facility]    Hypertension     2000, dx'd at same time as the DM    Obesity     Renal osteodystrophy     Smoker     Type II or unspecified type diabetes mellitus with renal manifestations, uncontrolled(250.42)          SURGICAL HISTORY       Past Surgical History:   Procedure Laterality Date    CARPAL TUNNEL RELEASE      CATARACT REMOVAL     [address], and 1979    CHOLECYSTECTOMY      COLONOSCOPY N/A 3/9/2018    Dr ReevesUbwxwb-Znfcutlkjbvkrc-Zskgnrakesvmc AP (-) dysplasia x 1, tubular AP x  (-) dysplasia x 1, HP x 4--1 yr recall    DIALYSIS FISTULA CREATION Left 4/23/15 [facility]    Left proximal ulnar artery to cephalic vein AVF creation    EYE SURGERY      laser, several times     TYMPANOSTOMY TUBE PLACEMENT Bilateral     VASCULAR SURGERY Left 5/11/15 [facility]    US-guided cannulation left distal upper arm cephalic vein with 4-Malawian glide sheath; LUE AV f'grams with venography SVC; left cephalic vein BAM with [id number] julieth balloon; completion venogram LUE    VASCULAR SURGERY Left 5/28/15 [facility]    Percutaneous cannulation left distal radial artery with 4-Malawian glide sheath; LUE AV f'grams with venography SVC; left cephalic vein balloon a'plasty with [id number] julieth balloon and [id number] julieth balloon; proximal and mid upper arm cephalic vein BAM with [id number] julieth balloon; completion venogram [facility]    VASCULAR SURGERY Left 6/15/15 [facility]    US-guided cannulation left cephalic vein with 4-french and later 7-french sheath; LUE AV f'grams including venography SVC; left cephalic vein stenosis balloon a'plasty with [id number] cutting balloon; completion f'gram and venogram [facility]    VASCULAR SURGERY  05/15/2017    [facility]. Left upper fistulograms/venograms.  VASCULAR SURGERY  02/14/2018    [facility]. Left upper fistulograms, left subclavian BA 12x40 atlas, left cephalic arch BA 19P91 conquest.    VASCULAR SURGERY  02/13/2019    [facility]. Left upper fistulograms,BA left SCV 8x40 conquest,stent left SCV, viabahn 13x50,left SCV stent BA 12x40 conquest.         CURRENT MEDICATIONS       Previous Medications    ALBUTEROL SULFATE HFA (PROVENTIL HFA) 108 (90 BASE) MCG/ACT INHALER    Inhale 2 puffs into the lungs every 6 hours as needed for Wheezing    ASPIRIN EC 81 MG EC TABLET    Take 81 mg by mouth daily    B-D ULTRAFINE III SHORT PEN 31G X 8 MM MISC    USE ONCE DAILY TO INJECT INSULIN DAILY    BIMATOPROST 0.01 % SOLN    Apply 1 drop to eye nightly. One drop in left eye at     BLOOD GLUCOSE MONITOR KIT AND SUPPLIES    1 kit by Other route 3 times daily Test three times a day & as needed for symptoms of irregular blood glucose. BLOOD GLUCOSE MONITORING SUPPL NIKOS    by Does not apply route. Pt will also need new lancet device if not included in kit.     BRIMONIDINE (ALPHAGAN P) 0.15 % OPHTHALMIC SOLUTION    Place 1 drop into the left eye 3 times daily     CARVEDILOL (COREG) 12.5 MG TABLET    Take 12.5 mg by mouth 2 times daily (with meals)    CETIRIZINE HCL (ZYRTEC ALLERGY PO)    Take 10 mg by mouth daily     CHOLECALCIFEROL (VITAMIN D3) 1000 UNITS CAPS    Take by mouth    DICYCLOMINE (BENTYL) 10 MG CAPSULE    Take 1 capsule by mouth 4 times daily    DORZOLAMIDE (TRUSOPT) 2 % OPHTHALMIC SOLUTION    Place 1 drop into the left eye 2 times daily     FAMOTIDINE (PEPCID) 20 MG TABLET    TAKE 1 TABLET BY MOUTH TWICE DAILY    FLUTICASONE (FLONASE) 50 MCG/ACT NASAL SPRAY    2 sprays by Nasal route daily. To keep ears opened up    FREESTYLE LITE STRIP    USE TO TEST BLOOD SUGAR TWICE DAILY    GLUCOSE BLOOD (BLOOD GLUCOSE TEST STRIPS) STRP    Check blood sugar twice a day for recent medication adjustments. HOMATROPINE 5 % OPHTHALMIC SOLUTION    Place 1 drop into the left eye daily     INSULIN GLARGINE (LANTUS SOLOSTAR) 100 UNIT/ML INJECTION PEN    INJECT 10 UNITS INTO THE SKIN ONCE DAILY    INSULIN SYRINGE-NEEDLE U-100 (ACCUSURE INS SYR 1CC/31GX5/16\") 31G X 5/16\" 1 ML MISC    by Does not apply route. IPRATROPIUM-ALBUTEROL (DUONEB) 0.5-2.5 (3) MG/3ML SOLN NEBULIZER SOLUTION    Inhale 3 mLs into the lungs every 4 hours    LACTULOSE (CHRONULAC) 10 GM/15ML SOLUTION    TAKE 30 ML BY MOUTH THREE TIMES DAILY    LOSARTAN (COZAAR) 50 MG TABLET    TAKE 1 TABLET BY MOUTH TWICE DAILY    MIDODRINE (PROAMATINE) 5 MG TABLET    TK 1 T PO HALF WAY THROUGH TREATMENT AS DIRECTED    ONDANSETRON (ZOFRAN) 4 MG TABLET    Take 4 mg by mouth every 6 hours    PRAVASTATIN (PRAVACHOL) 10 MG TABLET    TAKE 1 TABLET BY MOUTH EVERY NIGHT AT BEDTIME    SERTRALINE (ZOLOFT) 25 MG TABLET    Take 1 tablet by mouth daily    SEVELAMER (RENVELA) 800 MG TABLET    Take 1 tablet by mouth daily     TIMOLOL (TIMOPTIC-XR) 0.5 % OPHTHALMIC GEL-FORMING    Place 1 drop into the left eye 2 times daily     TRAVOPROST, ALLISON FREE, (TRAVATAN Z) 0.004 % SOLN OPHTHALMIC SOLUTION    1 drop nightly       ALLERGIES     Bupropion; Sulfa antibiotics; Varenicline; Wellbutrin [bupropion hcl];  Azithromycin; and Penicillins    FAMILY HISTORY       Family History   Problem Relation Age of Onset    Heart Disease Mother     Heart Attack Mother     Glaucoma Sister    [place] Diabetes Sister     Diabetes Brother     Kidney Disease Brother     Blindness Brother     Stroke Maternal Grandmother     Stroke Maternal Grandfather     Colon Cancer Neg Hx     Colon Polyps Neg Hx     Liver Cancer Neg Hx     Liver Disease Neg Hx     Esophageal Cancer Neg Hx     Rectal Cancer Neg Hx     Stomach Cancer Neg Hx           SOCIAL HISTORY       Social History     Socioeconomic History    Marital status:      Spouse name: None    Number of children: None    Years of education: None    Highest education level: None   Occupational History    None   Social Needs    Financial resource strain: None    Food insecurity     Worry: None     Inability: None    Transportation needs     Medical: None     Non-medical: None   Tobacco Use    Smoking status: Current Every Day Smoker     Packs/day: 1.00     Years: 40.00     Pack years: 40.00    Smokeless tobacco: Never Used   Substance and Sexual Activity    Alcohol use: No     Alcohol/week: 0.0 standard drinks    Drug use: Yes     Types: Marijuana    Sexual activity: None   Lifestyle    Physical activity     Days per week: None     Minutes per session: None    Stress: None   Relationships    Social connections     Talks on phone: None     Gets together: None     Attends [employer] service: None     Active member of club or organization: None     Attends meetings of clubs or organizations: None     Relationship status: None    Intimate partner violence     Fear of current or ex partner: None     Emotionally abused: None     Physically abused: None     Forced sexual activity: None   Other Topics Concern    None   Social History Narrative    None       SCREENINGS    [place] Coma Scale  Eye Opening: Spontaneous  Best Verbal Response: Oriented  Best Motor Response: Obeys commands  [place]ikki Coma Scale Score: 15        PHYSICAL EXAM    (up to 7 for level 4, 8 or more for level 5)     ED Triage Vitals   BP Temp Temp src Pulse Resp SpO2 Height Weight   -- -- -- -- -- -- -- --       Physical Exam  Vitals signs and nursing note reviewed. Constitutional:       General: She is not in acute distress. Appearance: She is well-developed. HENT:      Head: Normocephalic and atraumatic. Right Ear: External ear normal.      Left Ear: External ear normal.      Nose: Nose normal.      Mouth/Throat:      Pharynx: Oropharynx is clear. Eyes:      General: No scleral icterus. Conjunctiva/sclera: Conjunctivae normal.   Neck:      Musculoskeletal: Normal range of motion and neck supple. Comments: Minimal JVD  Cardiovascular:      Rate and Rhythm: Normal rate and regular rhythm. Pulses: Normal pulses. Heart sounds: Normal heart sounds. Comments: Good thrill over her AV fistula  Pulmonary:      Effort: Pulmonary effort is normal.      Breath sounds: Wheezing (There is diffuse expiratory wheezes. ) present. Abdominal:      General: Bowel sounds are normal.      Palpations: Abdomen is soft. Tenderness: There is no abdominal tenderness. Musculoskeletal: Normal range of motion. Right lower leg: Edema present. Left lower leg: Edema present. Comments: She has 2+ pitting edema which is tender. Skin:     General: Skin is warm and dry. Neurological:      General: No focal deficit present. Mental Status: She is alert and oriented to person, place, and time. Psychiatric:         Mood and Affect: Mood normal.         Behavior: Behavior normal.         DIAGNOSTIC RESULTS     EKG: All EKG's are interpreted by the Emergency Department Physician who either signs or Co-signs this chart in the absence of a cardiologist.    Sinus rhythm rate 77.   She has a QTC of 654    RADIOLOGY:   Non-plain film images such as CT, Ultrasound and MRI are read by the radiologist. Plainradiographic images are visualized and preliminarily interpreted by the emergency physician with the below findings:    I have reviewed the results and the images    Interpretation per the Radiologist below, if available at the time of this note:    XR CHEST PORTABLE   Final Result   1. Right pleural effusion and pulmonary vascular congestion. Signed by Dr Melania Quevedo on 7/11/2020 12:54 PM            ED BEDSIDE ULTRASOUND:   Performed by ED Physician - none    LABS:  Labs Reviewed   CBC WITH AUTO DIFFERENTIAL - Abnormal; Notable for the following components:       Result Value    RBC 4.04 (*)     Hemoglobin 11.7 (*)     MCHC 31.2 (*)     RDW 17.2 (*)     MPV 9.2 (*)     Neutrophils % 71.1 (*)     Lymphocytes % 16.1 (*)     Lymphocytes Absolute 0.8 (*)     All other components within normal limits   COMPREHENSIVE METABOLIC PANEL W/ REFLEX TO MG FOR LOW K - Abnormal; Notable for the following components:    Sodium 130 (*)     Chloride 87 (*)     Glucose 240 (*)     BUN 26 (*)     CREATININE 4.8 (*)     GFR Non- 9 (*)     Total Protein 6.4 (*)     Alkaline Phosphatase 465 (*)     All other components within normal limits   TROPONIN - Abnormal; Notable for the following components:    Troponin 0.06 (*)     All other components within normal limits   BRAIN NATRIURETIC PEPTIDE - Abnormal; Notable for the following components:    Pro-BNP 35,000 (*)     All other components within normal limits       All other labs were within normal range or not returned as of this dictation. EMERGENCY DEPARTMENT COURSE and DIFFERENTIALDIAGNOSIS/MDM:   Vitals:    Vitals:    07/11/20 1145 07/11/20 1233 07/11/20 1312 07/11/20 1332   BP: (!) 132/53 (!) 144/68  (!) 163/80   Pulse: 78 77 78 79   Resp: 20 20 17 20   Temp:   97.7 °F (36.5 °C)    TempSrc:   Oral    SpO2: 94% 94% 93% 92%       MDM  Number of Diagnoses or Management Options  Acute on chronic diastolic heart failure (HCC):   Cigarette smoker:   CKD (chronic kidney disease) stage V requiring chronic dialysis ([facility] [address]):    Hypervolemia, unspecified hypervolemia type:   Diagnosis management comments: Patient shows signs

## 2020-07-11 NOTE — PROGRESS NOTES
Pharmacy Renal Adjustment    Sunshine Guo is a 56 y.o. female. Pharmacy has renally adjusted medications per protocol. Recent Labs     07/11/20  1148   BUN 26*       Recent Labs     07/11/20  1148   CREATININE 4.8*       CrCl cannot be calculated (Unknown ideal weight.).     Height:   Ht Readings from Last 1 Encounters:   06/27/20 4' 11\" (1.499 m)     Weight:  Wt Readings from Last 1 Encounters:   06/27/20 150 lb (68 kg)       CKD stage: ESRD on HD             Plan: Adjust the following medications based on renal function:           Famotidine 20 mg po twice daily to 10 mg po once daily    Electronically signed by Sindhu Ames, [facility] on 7/11/2020 at 6:29 PM

## 2020-07-11 NOTE — PROGRESS NOTES
Amaya Beltrán arrived to room # 307. Presented with: SOB  Mental Status: Patient is oriented and alert. Vitals:    07/11/20 1830   BP: (!) 173/69   Pulse: 86   Resp: 16   Temp: 98.3 °F (36.8 °C)   SpO2: 94%     Patient safety contract and falls prevention contract reviewed with patient Yes. Oriented Patient to room. Call light within reach. Yes.   Needs, issues or concerns expressed at this time: no.      Electronically signed by Thai Tang RN on 7/11/2020 at 6:36 PM

## 2020-07-11 NOTE — H&P
Hospital Medicine  History and Physical    Patient:  Rodney Rendon  MRN: [id number]    CHIEF COMPLAINT:  Shortness of breath    History Obtained From: patient, chart    PCP: RAMONA Kaiser    HISTORY OF PRESENT ILLNESS:  56year old white female presents to the emergency department for shortness of breath. Sent from dialysis clinic where she presented for routine hemodialysis this morning. She was noted to be short of breath with labored breathing. Is not on diuresis, has been able to maintain euvolemia with dialysis previously. No missed or abbreviated treatments per patient. Denies fever, chills, cough, or sick contacts. No recent antibiotics or medication changes. She denies any chest pain. No treatment prior to arrival, no exacerbating or relieving factors. Nephrology contacted and patient sent to dialysis prior to floor. REVIEW OF SYSTEMS:  14 systems reviewed and negative except as noted above.     Past Medical History:      Diagnosis Date    Blind right eye     partial vision loss in the L eye too, due to DM    Blindness of one eye     Right     [facility]congestive heart failure) ([facility]     CKD (chronic kidney disease), stage IV ([facility])     secondary to diabetic nephropathy    [facility]     Diabetes mellitus with ophthalmic manifestations     dx'd in 2000 but likely ongoing before that, was dx'd when she went blind in the R eye    Glaucoma     Hemodialysis patient ([facility] [facility]     dialysis tues, thurs, sat. [facility]    Hypertension     2000, dx'd at same time as the DM    Obesity     Renal osteodystrophy     Smoker     Type II or unspecified type diabetes mellitus with renal manifestations, uncontrolled(250.42)        Past Surgical History:      Procedure Laterality Date    CARPAL TUNNEL RELEASE      CATARACT REMOVAL     [address] [place], and 1979    CHOLECYSTECTOMY      COLONOSCOPY N/A 3/9/2018    Dr ReevesAxukqa-Yuitpuofwjmrhd-Hpmcnnmjmocip AP (-) dysplasia x 1, RAMONA Nieto   ondansetron (ZOFRAN) 4 MG tablet Take 4 mg by mouth every 6 hours    Historical Provider, MD   ipratropium-albuterol (DUONEB) 0.5-2.5 (3) MG/3ML SOLN nebulizer solution Inhale 3 mLs into the lungs every 4 hours 5/20/20   RAMONA Nieto   albuterol sulfate HFA (PROVENTIL HFA) 108 (90 Base) MCG/ACT inhaler Inhale 2 puffs into the lungs every 6 hours as needed for Wheezing 5/20/20   RAMONA Nieto   dicyclomine (BENTYL) 10 MG capsule Take 1 capsule by mouth 4 times daily  Patient not taking: Reported on 5/20/2020 4/14/20   RAMONA Nieto   B-D ULTRAFINE III SHORT PEN 31G X 8 MM MISC USE ONCE DAILY TO INJECT INSULIN DAILY 3/16/20   RAMONA Nieto   carvedilol (COREG) 12.5 MG tablet Take 12.5 mg by mouth 2 times daily (with meals)    Historical Provider, MD   sertraline (ZOLOFT) 25 MG tablet Take 1 tablet by mouth daily 10/4/19 5/7/20  RAMONA Nieto   lactulose ([address] [facility]) 10 GM/15ML solution TAKE 30 ML BY MOUTH THREE TIMES DAILY  Patient not taking: Reported on 5/20/2020 9/25/19   RAMONA Nieto   famotidine (PEPCID) 20 MG tablet TAKE 1 TABLET BY MOUTH TWICE DAILY 7/2/19   Juan Miguel Eddy MD   Cholecalciferol (VITAMIN D3) 1000 units CAPS Take by mouth    Historical Provider, MD   blood glucose monitor kit and supplies 1 kit by Other route 3 times daily Test three times a day & as needed for symptoms of irregular blood glucose.  7/11/18 3/6/19  Tim CalenderRAMONA   Travoprost, BAK Free, (TRAVATAN Z) 0.004 % SOLN ophthalmic solution 1 drop nightly    Historical Provider, MD   midodrine (PROAMATINE) 5 MG tablet TK 1 T PO [address] TREATMENT AS DIRECTED 10/17/17   Historical Provider, MD   sevelamer (RENVELA) 800 MG tablet Take 1 tablet by mouth daily     Historical Provider, MD   aspirin EC 81 MG EC tablet Take 81 mg by mouth daily    Historical Provider, MD   FREESTYLE LITE strip USE TO TEST BLOOD SUGAR TWICE DAILY 12/29/16   Juan Miguel Eddy MD Cetirizine HCl (ZYRTEC ALLERGY PO) Take 10 mg by mouth daily     Historical Provider, MD   fluticasone (FLONASE) 50 MCG/ACT nasal spray 2 sprays by Nasal route daily. To keep ears opened up 3/27/14   Tino Maldonado MD   Blood Glucose Monitoring Suppl NIKOS by Does not apply route. Pt will also need new lancet device if not included in kit. 12/9/13   Tino Maldonado MD   Glucose Blood (BLOOD GLUCOSE TEST STRIPS) STRP Check blood sugar twice a day for recent medication adjustments. 12/9/13   Tino Maldonado MD   bimatoprost 0.01 % SOLN Apply 1 drop to eye nightly. One drop in left eye at hs    Historical Provider, MD   Insulin Syringe-Needle U-100 (ACCUSURE INS SYR 1CC/31GX5/16\") 31G X 5/16\" 1 ML MISC by Does not apply route. 9/25/12   Tino Maldonado MD   timolol (TIMOPTIC-XR) 0.5 % ophthalmic gel-forming Place 1 drop into the left eye 2 times daily  7/3/12   Historical Provider, MD   dorzolamide (TRUSOPT) 2 % ophthalmic solution Place 1 drop into the left eye 2 times daily     Historical Provider, MD   brimonidine (ALPHAGAN P) 0.15 % ophthalmic solution Place 1 drop into the left eye 3 times daily     Historical Provider, MD   homatropine 5 % ophthalmic solution Place 1 drop into the left eye daily     Historical Provider, MD       Allergies:  Bupropion; Sulfa antibiotics; Varenicline; Wellbutrin [bupropion hcl]; Azithromycin; and Penicillins    Social History:   TOBACCO:   reports that she has been smoking. She has a 40.00 pack-year smoking history. She has never used smokeless tobacco.  ETOH:   reports no history of alcohol use.     Family History:       Problem Relation Age of Onset    Heart Disease Mother     Heart Attack Mother     Glaucoma Sister     Diabetes Sister     Diabetes Brother     Kidney Disease Brother     Blindness Brother     Stroke Maternal Grandmother     Stroke Maternal Grandfather     Colon Cancer Neg Hx     Colon Polyps Neg Hx     Liver Cancer Neg Hx     Liver Disease Neg Hx  Esophageal Cancer Neg Hx     Rectal Cancer Neg Hx     Stomach Cancer Neg Hx            Physical Exam:    Vitals: BP (!) 163/80   Pulse 79   Temp 97.7 °F (36.5 °C) (Oral)   Resp 20   SpO2 92%   24HR INTAKE/OUTPUT:  No intake or output data in the 24 hours ending 07/11/20 1444  General appearance: alert and cooperative with exam  HEENT: atraumatic, eyes with clear conjunctiva and normal lids, pupils and irises normal, external ears and nose are normal, lips normal. Neck without masses, lympadenopathy, bruit, thyroid normal  Lungs: no increased work of breathing, diminished breath sounds bibasilar  Heart: regular rate and rhythm, S1, S2 normal, no murmur, click, rub or gallop  Abdomen: soft, non-tender; bowel sounds normal; no masses,  no organomegaly and obese  Extremities: edema 1+ bilaterally, modified Kirti's negative  Neurologic: No focal neurologic deficits, normal sensation, alert and oriented, affect and mood appropriate. Skin: no rashes, nodules. CBC:   Recent Labs     07/11/20  1148   WBC 5.2   HGB 11.7*        BMP:    Recent Labs     07/11/20  1148   *   K 3.8   CL 87*   CO2 27   BUN 26*   CREATININE 4.8*   GLUCOSE 240*     Hepatic:   Recent Labs     07/11/20  1148   AST 28   ALT 23   BILITOT 0.7   ALKPHOS 465*     Troponin T:   Recent Labs     07/11/20  1148   TROPONINI 0.06*     ABGs: No results for input(s): PH, PCO2, PO2, HCO3, BE, O2SAT in the last 72 hours. INR: No results for input(s): INR in the last 72 hours. Lactic Acid: No results for input(s): LACTA in the last 72 hours.   -----------------------------------------------------------------  PA/lat CXR: right pleural effusion, vascular congestion    EKG: very long QTc    Assessment and Plan   Principal Problem:    Fluid overload  Active Problems:    Type 2 diabetes mellitus with ophthalmic complication, with long-term current use of insulin (HCC)    Glaucoma    Diabetic nephropathy (HCC)    ESRD (end stage renal disease) on dialysis (HCC)    QT prolongation    Elevated troponin    Hyponatremia    Alkaline phosphatase elevation    Pleural effusion on right  Resolved Problems:    * No resolved hospital problems. *    Admit to med/surg. Nephrology consult to manage dialysis, appreciate assistance. Cardiology consult for QT prolongation. Hold sertraline until cleared by cardiology. Troponin elevation is minimal, especially for a dialysis patient. Furosemide 40 mg IV BID if okay with nephrology. Home medications reconciled. Insulin per scale.     Odette Loo,   Admitting Hospitalist

## 2020-07-12 VITALS
SYSTOLIC BLOOD PRESSURE: 159 MMHG | DIASTOLIC BLOOD PRESSURE: 87 MMHG | BODY MASS INDEX: 33.1 KG/M2 | OXYGEN SATURATION: 94 % | HEIGHT: 59 IN | TEMPERATURE: 97.6 F | WEIGHT: 164.2 LBS | HEART RATE: 74 BPM | RESPIRATION RATE: 18 BRPM

## 2020-07-12 PROBLEM — E87.70 FLUID OVERLOAD: Status: RESOLVED | Noted: 2020-06-08 | Resolved: 2020-07-12

## 2020-07-12 PROBLEM — R94.31 QT PROLONGATION: Status: RESOLVED | Noted: 2020-07-11 | Resolved: 2020-07-12

## 2020-07-12 LAB
ANION GAP SERPL CALCULATED.3IONS-SCNC: 10 MMOL/L (ref 7–19)
BUN BLDV-MCNC: 17 MG/DL (ref 8–23)
CALCIUM SERPL-MCNC: 9.2 MG/DL (ref 8.8–10.2)
CHLORIDE BLD-SCNC: 94 MMOL/L (ref 98–111)
CO2: 34 MMOL/L (ref 22–29)
CREAT SERPL-MCNC: 3.5 MG/DL (ref 0.5–0.9)
EKG P AXIS: 51 DEGREES
EKG P-R INTERVAL: 136 MS
EKG Q-T INTERVAL: 390 MS
EKG QRS DURATION: 80 MS
EKG QTC CALCULATION (BAZETT): 423 MS
EKG T AXIS: 3 DEGREES
GFR NON-AFRICAN AMERICAN: 13
GLUCOSE BLD-MCNC: 76 MG/DL (ref 74–109)
HCT VFR BLD CALC: 41 % (ref 37–47)
HEMOGLOBIN: 12 G/DL (ref 12–16)
MCH RBC QN AUTO: 28.6 PG (ref 27–31)
MCHC RBC AUTO-ENTMCNC: 29.3 G/DL (ref 33–37)
MCV RBC AUTO: 97.6 FL (ref 81–99)
PDW BLD-RTO: 17.4 % (ref 11.5–14.5)
PLATELET # BLD: 207 K/UL (ref 130–400)
PMV BLD AUTO: 9.7 FL (ref 9.4–12.3)
POTASSIUM SERPL-SCNC: 3.9 MMOL/L (ref 3.5–5)
RBC # BLD: 4.2 M/UL (ref 4.2–5.4)
SODIUM BLD-SCNC: 138 MMOL/L (ref 136–145)
WBC # BLD: 4.6 K/UL (ref 4.8–10.8)

## 2020-07-12 PROCEDURE — 6360000002 HC RX W HCPCS: Performed by: FAMILY MEDICINE

## 2020-07-12 PROCEDURE — 36415 COLL VENOUS BLD VENIPUNCTURE: CPT

## 2020-07-12 PROCEDURE — 80048 BASIC METABOLIC PNL TOTAL CA: CPT

## 2020-07-12 PROCEDURE — 94640 AIRWAY INHALATION TREATMENT: CPT

## 2020-07-12 PROCEDURE — 6370000000 HC RX 637 (ALT 250 FOR IP): Performed by: FAMILY MEDICINE

## 2020-07-12 PROCEDURE — 93010 ELECTROCARDIOGRAM REPORT: CPT | Performed by: INTERNAL MEDICINE

## 2020-07-12 PROCEDURE — 2580000003 HC RX 258: Performed by: FAMILY MEDICINE

## 2020-07-12 PROCEDURE — 85027 COMPLETE CBC AUTOMATED: CPT

## 2020-07-12 PROCEDURE — 93005 ELECTROCARDIOGRAM TRACING: CPT | Performed by: FAMILY MEDICINE

## 2020-07-12 RX ADMIN — DORZOLAMIDE HYDROCHLORIDE 1 DROP: 20 SOLUTION/ DROPS OPHTHALMIC at 08:17

## 2020-07-12 RX ADMIN — ASPIRIN 81 MG: 81 TABLET, COATED ORAL at 08:18

## 2020-07-12 RX ADMIN — FAMOTIDINE 10 MG: 20 TABLET ORAL at 08:18

## 2020-07-12 RX ADMIN — CARVEDILOL 12.5 MG: 12.5 TABLET, FILM COATED ORAL at 08:18

## 2020-07-12 RX ADMIN — TIMOLOL MALEATE 1 DROP: 5 SOLUTION/ DROPS OPHTHALMIC at 08:17

## 2020-07-12 RX ADMIN — HEPARIN SODIUM 5000 UNITS: 5000 INJECTION INTRAVENOUS; SUBCUTANEOUS at 06:44

## 2020-07-12 RX ADMIN — BRIMONIDINE TARTRATE 1 DROP: 2 SOLUTION OPHTHALMIC at 08:17

## 2020-07-12 RX ADMIN — IPRATROPIUM BROMIDE AND ALBUTEROL SULFATE 3 ML: .5; 3 SOLUTION RESPIRATORY (INHALATION) at 10:45

## 2020-07-12 RX ADMIN — FUROSEMIDE 40 MG: 10 INJECTION, SOLUTION INTRAMUSCULAR; INTRAVENOUS at 08:18

## 2020-07-12 RX ADMIN — FLUTICASONE PROPIONATE 2 SPRAY: 50 SPRAY, METERED NASAL at 08:17

## 2020-07-12 RX ADMIN — IPRATROPIUM BROMIDE AND ALBUTEROL SULFATE 3 ML: .5; 3 SOLUTION RESPIRATORY (INHALATION) at 06:35

## 2020-07-12 RX ADMIN — LACTULOSE 20 G: 20 SOLUTION ORAL at 08:18

## 2020-07-12 RX ADMIN — SEVELAMER CARBONATE 800 MG: 800 TABLET, FILM COATED ORAL at 08:18

## 2020-07-12 RX ADMIN — VITAMIN D, TAB 1000IU (100/BT) 1000 UNITS: 25 TAB at 08:18

## 2020-07-12 RX ADMIN — LOSARTAN POTASSIUM 50 MG: 50 TABLET ORAL at 08:18

## 2020-07-12 RX ADMIN — CETIRIZINE HYDROCHLORIDE 10 MG: 10 TABLET, FILM COATED ORAL at 08:18

## 2020-07-12 RX ADMIN — SODIUM CHLORIDE, PRESERVATIVE FREE 10 ML: 5 INJECTION INTRAVENOUS at 08:18

## 2020-07-12 NOTE — PROGRESS NOTES
4 Eyes Skin Assessment    Christy Johansen is being assessed upon: Admission    I agree that I, Essie Palm, along with Yonatan Rapp RN (either 2 RN's or 1 LPN and 1 RN) have performed a thorough Head to Toe Skin Assessment on the patient. ALL assessment sites listed below have been assessed. Areas assessed by both nurses:     [x]   Head, Face, and Ears   [x]   Shoulders, Back, and Chest  [x]   Arms, Elbows, and Hands   [x]   Coccyx, Sacrum, and Ischium  [x]   Legs, Feet, and Heels    Does the Patient have Skin Breakdown?  No    Jake Prevention initiated: No  Wound Care Orders initiated: No    WOC nurse consulted for Pressure Injury (Stage 3,4, Unstageable, DTI, NWPT, and Complex wounds) and New or Established Ostomies: No        Primary Nurse eSignature: Essie Palm RN on 7/11/2020 at 11:31 PM      Co-Signer eSignature: {Esignature:[id number]}

## 2020-07-12 NOTE — DISCHARGE SUMMARY
Emily Sidhu  :  1960  MRN:  [id number]    Admit date:  2020  Discharge date:      Admitting Physician:  Nicola Ceron DO    Advance Directive: Full Code    Consults: cardiology (not completed, canceled) and nephrology    Primary Care Physician:  RAMONA Pizarro    Discharge Diagnoses:  Principal Problem (Resolved):    Fluid overload  Active Problems:    Type 2 diabetes mellitus with ophthalmic complication, with long-term current use of insulin ([facility])    Glaucoma    Diabetic nephropathy ([address]    ESRD (end stage renal disease) on dialysis (HCC)    Elevated troponin    Hyponatremia    Alkaline phosphatase elevation    Pleural effusion on right  Resolved Problems:    QT prolongation      Significant Diagnostic Studies:   Xr Chest Portable    Result Date: 2020  XR CHEST PORTABLE 2020 11:30 AM HISTORY: Shortness of breath COMPARISON: 2020. FINDINGS: Frontal view of the chest was obtained. A right pleural effusion is present. The heart is enlarged. The pulmonary vasculature is prominent. The osseous structures and surrounding soft tissues demonstrate no acute abnormality. 1. Right pleural effusion and pulmonary vascular congestion. Signed by Dr Sveta Perez on 2020 12:54 PM      Pertinent Labs:   CBC:   Recent Labs     20  1148 20  0124   WBC 5.2 4.6*   HGB 11.7* 12.0    207     BMP:    Recent Labs     20  1148 20  0124   * 138   K 3.8 3.9   CL 87* 94*   CO2 27 34*   BUN 26* 17   CREATININE 4.8* 3.5*   GLUCOSE 240* 76     INR: No results for input(s): INR in the last 72 hours. ABGs:No results for input(s): PH, PO2, PCO2, HCO3, BE, O2SAT in the last 72 hours. Lactic Acid:No results for input(s): LACTA in the last 72 hours. Procedures: The patient underwent hemodialysis on 2020. Hospital Course:   : Sent to ED from dialysis clinic due to dyspnea and shortness of breath.  No diuretics, euvolemic previously but suddenly felt like for Wheezing             aspirin EC 81 MG EC tablet  Take 81 mg by mouth daily             B-D ULTRAFINE III SHORT PEN 31G X 8 MM MISC  USE ONCE DAILY TO INJECT INSULIN DAILY             bimatoprost 0.01 % SOLN  Apply 1 drop to eye nightly. One drop in left eye at              blood glucose monitor kit and supplies  1 kit by Other route 3 times daily Test three times a day & as needed for symptoms of irregular blood glucose. Blood Glucose Monitoring Suppl NIKOS  by Does not apply route. Pt will also need new lancet device if not included in kit. brimonidine (ALPHAGAN P) 0.15 % ophthalmic solution  Place 1 drop into the left eye 3 times daily              carvedilol (COREG) 12.5 MG tablet  Take 12.5 mg by mouth 2 times daily (with meals)             Cetirizine HCl (ZYRTEC ALLERGY PO)  Take 10 mg by mouth daily              Cholecalciferol (VITAMIN D3) 1000 units CAPS  Take by mouth             dicyclomine (BENTYL) 10 MG capsule  Take 1 capsule by mouth 4 times daily             dorzolamide (TRUSOPT) 2 % ophthalmic solution  Place 1 drop into the left eye 2 times daily              famotidine (PEPCID) 20 MG tablet  TAKE 1 TABLET BY MOUTH TWICE DAILY             fluticasone (FLONASE) 50 MCG/ACT nasal spray  2 sprays by Nasal route daily. To keep ears opened up             Glucose Blood (BLOOD GLUCOSE TEST STRIPS) STRP  Check blood sugar twice a day for recent medication adjustments. homatropine 5 % ophthalmic solution  Place 1 drop into the left eye daily              insulin glargine (LANTUS SOLOSTAR) 100 UNIT/ML injection pen  INJECT 10 UNITS INTO THE SKIN ONCE DAILY             Insulin Syringe-Needle U-100 (ACCUSURE INS SYR 1CC/31GX5/16\") 31G X 5/16\" 1 ML MISC  by Does not apply route.              ipratropium-albuterol (DUONEB) 0.5-2.5 (3) MG/3ML SOLN nebulizer solution  Inhale 3 mLs into the lungs every 4 hours             lactulose (CHRONULAC) 10 GM/15ML solution  TAKE 30 ML BY MOUTH THREE TIMES DAILY             losartan (COZAAR) 50 MG tablet  TAKE 1 TABLET BY MOUTH TWICE DAILY             midodrine (PROAMATINE) 5 MG tablet  TK 1 T PO HALF WAY THROUGH TREATMENT AS DIRECTED             ondansetron (ZOFRAN) 4 MG tablet  Take 4 mg by mouth every 6 hours             pravastatin (PRAVACHOL) 10 MG tablet  TAKE 1 TABLET BY MOUTH EVERY NIGHT AT BEDTIME             sertraline (ZOLOFT) 25 MG tablet  Take 1 tablet by mouth daily             sevelamer (RENVELA) 800 MG tablet  Take 1 tablet by mouth daily              timolol (TIMOPTIC-XR) 0.5 % ophthalmic gel-forming  Place 1 drop into the left eye 2 times daily              Travoprost, ALLISON Free, (TRAVATAN Z) 0.004 % SOLN ophthalmic solution  1 drop nightly                 Discharge Instructions: Follow up with RAMONA Alexander in 7 days. Take medications as directed. Resume activity as tolerated. Diet: DIET LOW SODIUM 2 GM; 1200 ml     Disposition: Patient is medically stable and will be discharged Home. Time spent on discharge less than 30 minutes.     Signed:  Lesa Watson DO

## 2020-07-12 NOTE — PROGRESS NOTES
Dayron Epley, DO   10 mg at 07/11/20 2113    [Held by provider] sertraline (ZOLOFT) tablet 25 mg  25 mg Oral Daily Dayron Epley, DO        sevelamer (RENVELA) tablet 800 mg  800 mg Oral Daily [facility], DO   800 mg at 07/12/20 0818    timolol (TIMOPTIC) 0.5 % ophthalmic solution 1 drop  1 drop Left Eye BID Dayron Epley, DO   1 drop at 07/12/20 0817    insulin lispro (HUMALOG) injection vial 0-12 Units  0-12 Units Subcutaneous TID WC Gregg Heck, DO        insulin lispro (HUMALOG) injection vial 0-6 Units  0-6 Units Subcutaneous Nightly Dayron Epley, DO   3 Units at 07/11/20 2124    glucose (GLUTOSE) 40 % oral gel 15 g  15 g Oral PRN Gregg Heck, DO        dextrose 50 % IV solution  12.5 g Intravenous PRN Teddie Epley, DO        glucagon (rDNA) injection 1 mg  1 mg Intramuscular PRN Dayron Epley, DO        dextrose 5 % solution  100 mL/hr Intravenous PRN Dayron Epley, DO        famotidine (PEPCID) tablet 10 mg  10 mg Oral Daily Gregg Heck, DO   10 mg at 07/12/20 0818       Past Medical History:  Past Medical History:   Diagnosis Date    Blind right eye     partial vision loss in the L eye too, due to DM    Blindness of one eye     Right     CHF (congestive heart failure) (HCC)     CKD [facility] IV ([facility])     secondary to diabetic nephropathy    [facility]     Diabetes mellitus with ophthalmic manifestations     dx'd in 2000 but likely ongoing before that, was dx'd when she went blind in the R eye    Glaucoma     Hemodialysis patient ([facility] [address]     dialysis tues, thurs, sat. [facility] [address]    Hypertension     2000, dx'd at same time as the DM    Obesity     Renal osteodystrophy     Smoker     Type II or unspecified type diabetes mellitus with renal manifestations, uncontrolled(250.42)        Past Surgical History:  Past Surgical History:   Procedure Laterality Date    CARPAL TUNNEL RELEASE      CATARACT REMOVAL     [address] [place], and 1979    CHOLECYSTECTOMY       Liver Disease Neg Hx     Esophageal Cancer Neg Hx     Rectal Cancer Neg Hx     Stomach Cancer Neg Hx        Social History  Social History     Socioeconomic History    Marital status:      Spouse name: Not on file    Number of children: Not on file    Years of education: Not on file    Highest education level: Not on file   Occupational History    Not on file   Social Needs    Financial resource strain: Not on file    Food insecurity     Worry: Not on file     Inability: Not on file    Transportation needs     Medical: Not on file     Non-medical: Not on file   Tobacco Use    Smoking status: Current Every Day Smoker     Packs/day: 1.00     Years: 40.00     Pack years: 40.00    Smokeless tobacco: Never Used   Substance and Sexual Activity    Alcohol use: No     Alcohol/week: 0.0 standard drinks    Drug use: Yes     Types: Marijuana    Sexual activity: Not Currently     Partners: Male   Lifestyle    Physical activity     Days per week: Not on file     Minutes per session: Not on file    Stress: Not on file   Relationships    Social connections     Talks on phone: Not on file     Gets together: Not on file     Attends Islam service: Not on file     Active member of club or organization: Not on file     Attends meetings of clubs or organizations: Not on file     Relationship status: Not on file    Intimate partner violence     Fear of current or ex partner: Not on file     Emotionally abused: Not on file     Physically abused: Not on file     Forced sexual activity: Not on file   Other Topics Concern    Not on file   Social History Narrative    Not on file         Review of Systems:  History obtained from chart review and the patient  General ROS: No fever or chills  Respiratory ROS: positive for - shortness of breath  Cardiovascular ROS: No chest pain or palpitations  Gastrointestinal ROS: No abdominal pain or melena  Genito-Urinary ROS: No dysuria or hematuria  Musculoskeletal ROS: No joint pain or swelling         Objective:  Patient Vitals for the past 24 hrs:   BP Temp Temp src Pulse Resp SpO2 Height Weight   07/12/20 0612 (!) 170/78 97.3 °F (36.3 °C) -- 84 18 96 % -- --   07/12/20 0451 -- -- -- -- -- -- -- 164 lb 3.2 oz (74.5 kg)   07/12/20 0014 (!) 169/65 98.3 °F (36.8 °C) Temporal 89 18 95 % -- --   07/11/20 1830 (!) 173/69 98.3 °F (36.8 °C) Temporal 86 16 94 % -- --   07/11/20 1828 -- -- -- -- -- -- 4' 11\" (1.499 m) 159 lb 3.2 oz (72.2 kg)   07/11/20 1332 (!) 163/80 -- -- 79 20 92 % -- --   07/11/20 1312 -- 97.7 °F (36.5 °C) Oral 78 17 93 % -- --   07/11/20 1233 (!) 144/68 -- -- 77 20 94 % -- --   07/11/20 1145 (!) 132/53 -- -- 78 20 94 % -- --       Intake/Output Summary (Last 24 hours) at 7/12/2020 0947  Last data filed at 7/11/2020 1755  Gross per 24 hour   Intake --   Output 4000 ml   Net -4000 ml     General: awake/alert   HEENT: Normocephalic atraumatic head  Neck: Supple with no JVD or carotid bruits. Chest:  Decreased breath sound on both lung field. CVS: regular rate and rhythm  Abdominal: soft, nontender, normal bowel sounds  Extremities: no cyanosis or edema  Skin: warm and dry without rash    Labs:  BMP:   Recent Labs     07/11/20  1148 07/12/20  0124   * 138   K 3.8 3.9   CL 87* 94*   CO2 27 34*   BUN 26* 17   CREATININE 4.8* 3.5*   CALCIUM 9.3 9.2     CBC:   Recent Labs     07/11/20  1148 07/12/20  0124   WBC 5.2 4.6*   HGB 11.7* 12.0   HCT 37.5 41.0   MCV 92.8 97.6    207     LIVER PROFILE:   Recent Labs     07/11/20  1148   AST 28   ALT 23   BILITOT 0.7   ALKPHOS 465*     PT/INR: No results for input(s): PROTIME, INR in the last 72 hours. APTT: No results for input(s): APTT in the last 72 hours. BNP:  No results for input(s): BNP in the last 72 hours. Ionized Calcium:No results for input(s): IONCA in the last 72 hours. Magnesium:No results for input(s): MG in the last 72 hours.   Phosphorus:No results for input(s): PHOS in the last 72 hours. HgbA1C: No results for input(s): LABA1C in the last 72 hours. Hepatic:   Recent Labs     07/11/20  1148   ALKPHOS 465*   ALT 23   AST 28   PROT 6.4*   BILITOT 0.7   LABALBU 3.7     Lactic Acid: No results for input(s): LACTA in the last 72 hours. Troponin: No results for input(s): CKTOTAL, CKMB, TROPONINT in the last 72 hours. ABGs:   Lab Results   Component Value Date    PHART 7.510 05/16/2020    PO2ART 51.0 05/16/2020    [id number] 45.0 05/16/2020     CRP:  No results for input(s): CRP in the last 72 hours. Sed Rate:  No results for input(s): SEDRATE in the last 72 hours. Culture Results:   Blood Culture Recent: No results for input(s): BC in the last 720 hours. Urine Culture Recent : No results for input(s): LABURIN in the last 720 hours. Radiology reports as per the Radiologist  Radiology: Xr Chest Portable    Result Date: 7/11/2020  XR CHEST PORTABLE 7/11/2020 11:30 AM HISTORY: Shortness of breath COMPARISON: 6/8/2020. FINDINGS: Frontal view of the chest was obtained. A right pleural effusion is present. The heart is enlarged. The pulmonary vasculature is prominent. The osseous structures and surrounding soft tissues demonstrate no acute abnormality. 1. Right pleural effusion and pulmonary vascular congestion. Signed by Dr Carmine Nicholas on 7/11/2020 12:54 PM       Assessment   1. End-stage renal disease on maintenance dialysis. 2.  Type II diabetic nephropathy. 3.  Pulmonary edema now resolved. 4.  Frequent no-shows. 5.  Secondary hyperparathyroidism. 6.  Chronic obstructive pulmonary disease  7. Active tobacco use. Plan:  1. Counseling about fluid restriction. 2.  Okay to discharge her.   3.  Follow-up at [facility] Marie Batista MD  07/12/20  9:47 AM

## 2020-07-12 NOTE — CONSULTS
(COREG) tablet 12.5 mg, 12.5 mg, Oral, BID WC, Freddy Cannon, DO    cetirizine (ZYRTEC) tablet 10 mg, 10 mg, Oral, Daily, Freddy Cannon, DO    Vitamin D (CHOLECALCIFEROL) tablet 1,000 Units, 1,000 Units, Oral, Daily, Clemetine Clonts Heck, DO    dorzolamide (TRUSOPT) 2 % ophthalmic solution 1 drop, 1 drop, Left Eye, BID, Clemetine Clon Heck, DO    fluticasone (FLONASE) 50 MCG/ACT nasal spray 2 spray, 2 spray, Nasal, Daily, Gregg Heck, DO    homatropine 5 % ophthalmic solution 1 drop, 1 drop, Left Eye, Daily, [facility]    insulin glargine (LANTUS) injection vial 10 Units, 10 Units, Subcutaneous, Nightly, Freddy Cannon,     ipratropium-albuterol (DUONEB) nebulizer solution 3 mL, 1 vial, Inhalation, 4x daily, Freddy Cannon, , 3 mL at 07/11/20 2024    lactulose (CHRONULAC) 10 GM/15ML solution 20 g, 20 g, Oral, TID, Freddy Cannon,     losartan (COZAAR) tablet 50 mg, 50 mg, Oral, BID, Freddy Cannon DO    midodrine (PROAMATINE) tablet 5 mg, 5 mg, Oral, See Admin Instructions, Freddy Cannon DO    pravastatin (PRAVACHOL) tablet 10 mg, 10 mg, Oral, Nightly, Freddy Cannon, DO    [Held by provider] sertraline (ZOLOFT) tablet 25 mg, 25 mg, Oral, Daily, Freddy Cannon DO    sevelamer (RENVELA) tablet 800 mg, 800 mg, Oral, Daily, Clemetine Clon Heck, DO    timolol (TIMOPTIC) 0.5 % ophthalmic solution 1 drop, 1 drop, Left Eye, BID, [facility]    insulin lispro (HUMALOG) injection vial 0-12 Units, 0-12 Units, Subcutaneous, TID , Gregg Heck, DO    insulin lispro (HUMALOG) injection vial 0-6 Units, 0-6 Units, Subcutaneous, Nightly, Gregg Heck, DO    glucose (GLUTOSE) 40 % oral gel 15 g, 15 g, Oral, PRN, Clemetine Clonts Heck, DO    dextrose 50 % IV solution, 12.5 g, Intravenous, PRN, Freddy Cannon, DO    glucagon (rDNA) injection 1 mg, 1 mg, Intramuscular, PRN, Freddy Cannon, DO    dextrose 5 % solution, 100 mL/hr, Intravenous, PRN, Freddy Cannon, DO    famotidine (PEPCID) tablet 10 mg, 10 mg, Oral, Daily, Clemetine Clonts Heck, DO  No outpatient medications have been marked as taking for the 7/11/20 encounter [facility] Encounter). Allergies   Bupropion; Sulfa antibiotics; Varenicline; Wellbutrin [bupropion hcl]; Azithromycin; and Penicillins    Family History       Family History   Problem Relation Age of Onset    Heart Disease Mother     Heart Attack Mother     Glaucoma Sister     Diabetes Sister     Diabetes Brother     Kidney Disease Brother     Blindness Brother     Stroke Maternal Grandmother     Stroke Maternal Grandfather     Colon Cancer Neg Hx     Colon Polyps Neg Hx     Liver Cancer Neg Hx     Liver Disease Neg Hx     Esophageal Cancer Neg Hx     Rectal Cancer Neg Hx     Stomach Cancer Neg Hx      Family history negative for kidney disease.      Social History      Social History     Socioeconomic History    Marital status:      Spouse name: None    Number of children: None    Years of education: None    Highest education level: None   Occupational History    None   Social Needs    Financial resource strain: None    Food insecurity     Worry: None     Inability: None    Transportation needs     Medical: None     Non-medical: None   Tobacco Use    Smoking status: Current Every Day Smoker     Packs/day: 1.00     Years: 40.00     Pack years: 40.00    Smokeless tobacco: Never Used   Substance and Sexual Activity    Alcohol use: No     Alcohol/week: 0.0 standard drinks    Drug use: Yes     Types: Marijuana    Sexual activity: Not Currently     Partners: Male   Lifestyle    Physical activity     Days per week: None     Minutes per session: None    Stress: None   Relationships    Social connections     Talks on phone: None     Gets together: None     Attends Muslim service: None     Active member of club or organization: None     Attends meetings of clubs or organizations: None     Relationship status: None    Intimate partner violence     Fear of current or ex partner: None     Emotionally abused: None     Physically abused: None     Forced sexual activity: None   Other Topics Concern    None   Social History Narrative    None       Physical Exam     Blood pressure (!) 173/69, pulse 86, temperature 98.3 °F (36.8 °C), temperature source Temporal, resp. rate 16, height 4' 11\" (1.499 m), weight 159 lb 3.2 oz (72.2 kg), SpO2 94 %. General:  NAD, A+Ox3, ill-appearing, normal body habitus  HEENT:  PERRL, EOMI  Neck:  Supple, normal range of movement  Chest:  CTAB, good respiratory effort, good air movement  CV:  RRR, soft systolic murmur  Abdomen:  NTND, soft, +BS, no hepatosplenomegaly  Extremities:  1+ peripheral edema  Neurological:  Moving all four extremities  Lymphatics:  No palpable lymph nodes   Skin:  No rash, no jaundice  Psychiatric:  Normal insight and judgement, good recall    Data     Recent Labs     07/11/20  1148   WBC 5.2   HGB 11.7*   HCT 37.5   MCV 92.8        Recent Labs     07/11/20  1148   *   K 3.8   CL 87*   CO2 27   GLUCOSE 240*   BUN 26*   CREATININE 4.8*   LABGLOM 9*       Assessment:  1. ESRD  2. Type 2 DM with renal disease  3. Acute/ chronic resp failure  4. Hyponatremia  5. Right pleural effusion      Plan:  · Dialysis was urgently administered and it provided symptomatic relief. Will follow up labs. Place on fluids restrictions.      Thank you for asking us to participate in the management of your patient, please do not hesitate to contact me for any concerns regarding my recommendations as outlined above.    -----------------------------  Electronically signed by Marcelle Johnson MD on 7/11/20 at 8:31 PM CDT

## 2020-07-12 NOTE — PLAN OF CARE
Problem: Infection:  Goal: Will remain free from infection  Description: Will remain free from infection  7/12/2020 0113 by Marv Colon RN  Outcome: Ongoing  7/11/2020 1825 by Joanne Mcfadden RN  Outcome: Ongoing     Problem: Safety:  Goal: Free from accidental physical injury  Description: Free from accidental physical injury  7/12/2020 0113 by Marv Colon RN  Outcome: Ongoing  7/11/2020 1825 by Joanne Mcfadden RN  Outcome: Ongoing  Goal: Free from intentional harm  Description: Free from intentional harm  7/12/2020 0113 by Marv Colon RN  Outcome: Ongoing  7/11/2020 1825 by Joanne Mcfadden RN  Outcome: Ongoing     Problem: Daily Care:  Goal: Daily care needs are met  Description: Daily care needs are met  7/12/2020 0113 by Marv Colon RN  Outcome: Ongoing  7/11/2020 1825 by Joanne Mcfadden RN  Outcome: Ongoing     Problem: Pain:  Goal: Patient's pain/discomfort is manageable  Description: Patient's pain/discomfort is manageable  7/12/2020 0113 by Marv Colon RN  Outcome: Ongoing  7/11/2020 1825 by Joanne Mcfadden RN  Outcome: Ongoing     Problem: Skin Integrity:  Goal: Skin integrity will stabilize  Description: Skin integrity will stabilize  7/12/2020 0113 by Marv Colon RN  Outcome: Ongoing  7/11/2020 1825 by Joanne Mcfadden RN  Outcome: Ongoing     Problem: Discharge Planning:  Goal: Patients continuum of care needs are met  Description: Patients continuum of care needs are met  7/12/2020 0113 by Marv Colon RN  Outcome: Ongoing  7/11/2020 1825 by Joanne Mcfadden RN  Outcome: Ongoing     Problem: Fluid Volume:  Goal: Ability to achieve a balanced intake and output will improve  Description: Ability to achieve a balanced intake and output will improve  7/12/2020 0113 by Marv Colon RN  Outcome: Ongoing  7/11/2020 1825 by Joanne Mcfadden RN  Outcome: Ongoing  Goal: Will show no signs or symptoms of fluid imbalance  Description: Will show no signs or symptoms of fluid imbalance  7/12/2020 0420 by Ming Wyman RN  Outcome: Ongoing  7/11/2020 1825 by Corbin Montiel RN  Outcome: Ongoing     Problem: Physical Regulation:  Goal: Ability to maintain clinical measurements within normal limits will improve  Description: Ability to maintain clinical measurements within normal limits will improve  7/12/2020 0113 by Ming Wyman RN  Outcome: Ongoing  7/11/2020 1825 by Corbin Montiel RN  Outcome: Ongoing  Goal: Will show no signs and symptoms of electrolyte imbalance  Description: Will show no signs and symptoms of electrolyte imbalance  7/12/2020 0113 by Ming Wyman RN  Outcome: Ongoing  7/11/2020 1825 by Corbin Montiel RN  Outcome: Ongoing  Goal: Complications related to the disease process, condition or treatment will be avoided or minimized  Description: Complications related to the disease process, condition or treatment will be avoided or minimized  7/12/2020 0113 by Ming Wyman RN  Outcome: Ongoing  7/11/2020 1825 by Corbin Montiel RN  Outcome: Ongoing     Problem:  Activity:  Goal: Fatigue will decrease  Description: Fatigue will decrease  7/12/2020 0113 by Ming Wyman RN  Outcome: Ongoing  7/11/2020 1825 by Corbin Montiel RN  Outcome: Ongoing  Goal: Risk for activity intolerance will decrease  Description: Risk for activity intolerance will decrease  7/12/2020 0113 by Ming Wyman RN  Outcome: Ongoing  7/11/2020 1825 by Corbin Montiel RN  Outcome: Ongoing     Problem: Coping:  Goal: Ability to cope will improve  Description: Ability to cope will improve  7/12/2020 0113 by Ming Wyman RN  Outcome: Ongoing  7/11/2020 1825 by Corbin Montiel RN  Outcome: Ongoing     Problem: Health Behavior:  Goal: Ability to manage health-related needs will improve  Description: Ability to manage health-related needs will improve  7/12/2020 0113 by Ming Wyman RN  Outcome: Ongoing  7/11/2020 1825 by Corbin Montiel RN  Outcome: Ongoing  Goal: Identification of resources available to assist in meeting health care needs will improve  Description: Identification of resources available to assist in meeting health care needs will improve  7/12/2020 0113 by Pieter Cabezas RN  Outcome: Ongoing  7/11/2020 1825 by Stevenson Brewster RN  Outcome: Ongoing     Problem: Nutritional:  Goal: Ability to identify appropriate dietary choices will improve  Description: Ability to identify appropriate dietary choices will improve  7/12/2020 0113 by Pieter Cabezas RN  Outcome: Ongoing  7/11/2020 1825 by Stevenson Brewster RN  Outcome: Ongoing     Problem: Sensory:  Goal: General experience of comfort will improve  Description: General experience of comfort will improve  7/12/2020 0113 by Pieter Cabezas RN  Outcome: Ongoing  7/11/2020 1825 by Stevenson Brewster RN  Outcome: Ongoing     Problem: Skin Integrity:  Goal: Status of oral mucous membranes will improve  Description: Status of oral mucous membranes will improve  7/12/2020 0113 by Pieter Cabezas RN  Outcome: Ongoing  7/11/2020 1825 by Stevenson Brewster RN  Outcome: Ongoing  Goal: Skin integrity will be maintained  Description: Skin integrity will be maintained  7/12/2020 0113 by Pieter Cabezas RN  Outcome: Ongoing  7/11/2020 1825 by Stevenson Brewster RN  Outcome: Ongoing     Problem: Falls - Risk of:  Goal: Will remain free from falls  Description: Will remain free from falls  Outcome: Ongoing  Goal: Absence of physical injury  Description: Absence of physical injury  Outcome: Ongoing

## 2020-07-12 NOTE — PLAN OF CARE
meeting health care needs will improve  Description: Identification of resources available to assist in meeting health care needs will improve  7/12/2020 0113 by Emliy Garg RN  Outcome: Ongoing  7/11/2020 1825 by Dante Patel RN  Outcome: Ongoing     Problem: Nutritional:  Goal: Ability to identify appropriate dietary choices will improve  Description: Ability to identify appropriate dietary choices will improve  7/12/2020 0113 by Emily Garg RN  Outcome: Ongoing  7/11/2020 1825 by Dante Patel RN  Outcome: Ongoing     Problem: Sensory:  Goal: General experience of comfort will improve  Description: General experience of comfort will improve  7/12/2020 0113 by Emily Garg RN  Outcome: Ongoing  7/11/2020 1825 by Dante Patel RN  Outcome: Ongoing     Problem: Skin Integrity:  Goal: Status of oral mucous membranes will improve  Description: Status of oral mucous membranes will improve  7/12/2020 0113 by Emily Garg RN  Outcome: Ongoing  7/11/2020 1825 by Dante Patel RN  Outcome: Ongoing  Goal: Skin integrity will be maintained  Description: Skin integrity will be maintained  7/12/2020 0113 by Emily Garg RN  Outcome: Ongoing  7/11/2020 1825 by Dante Patel RN  Outcome: Ongoing     Problem: Falls - Risk of:  Goal: Will remain free from falls  Description: Will remain free from falls  Outcome: Ongoing  Goal: Absence of physical injury  Description: Absence of physical injury  Outcome: Ongoing

## 2020-07-12 NOTE — PLAN OF CARE
Problem: Infection:  Goal: Will remain free from infection  Description: Will remain free from infection  7/12/2020 1051 by Pedro Escobedo RN  Outcome: Ongoing  7/12/2020 0113 by Dorcas Gaucher, RN  Outcome: Ongoing     Problem: Safety:  Goal: Free from accidental physical injury  Description: Free from accidental physical injury  7/12/2020 1051 by Pedro Escobedo RN  Outcome: Ongoing  7/12/2020 0113 by Dorcas Gaucher, RN  Outcome: Ongoing  Goal: Free from intentional harm  Description: Free from intentional harm  7/12/2020 1051 by Pedro Escobedo RN  Outcome: Ongoing  7/12/2020 0113 by Dorcas Gaucher, RN  Outcome: Ongoing     Problem: Daily Care:  Goal: Daily care needs are met  Description: Daily care needs are met  7/12/2020 1051 by Pedro Escobedo RN  Outcome: Ongoing  7/12/2020 0113 by Dorcas Gaucher, RN  Outcome: Ongoing     Problem: Pain:  Goal: Patient's pain/discomfort is manageable  Description: Patient's pain/discomfort is manageable  7/12/2020 1051 by Pedro Escobedo RN  Outcome: Ongoing  7/12/2020 0113 by Dorcas Gaucher, RN  Outcome: Ongoing     Problem: Skin Integrity:  Goal: Skin integrity will stabilize  Description: Skin integrity will stabilize  7/12/2020 1051 by Pedro Escobedo RN  Outcome: Ongoing  7/12/2020 0113 by Dorcas Gaucher, RN  Outcome: Ongoing     Problem: Discharge Planning:  Goal: Patients continuum of care needs are met  Description: Patients continuum of care needs are met  7/12/2020 1051 by Pedro Escobedo RN  Outcome: Ongoing  7/12/2020 0113 by Dorcas Gaucher, RN  Outcome: Ongoing     Problem: Fluid Volume:  Goal: Ability to achieve a balanced intake and output will improve  Description: Ability to achieve a balanced intake and output will improve  7/12/2020 1051 by Pedro Escobedo RN  Outcome: Ongoing  7/12/2020 0113 by Dorcas Gaucher, RN  Outcome: Ongoing  Goal: Will show no signs or symptoms of fluid imbalance  Description: Will show no signs or symptoms of fluid imbalance  7/12/2020 1051 by Rayleen Gaucher, RN  Outcome: Ongoing  7/12/2020 0113 by Gretchen Henriquez RN  Outcome: Ongoing     Problem: Physical Regulation:  Goal: Ability to maintain clinical measurements within normal limits will improve  Description: Ability to maintain clinical measurements within normal limits will improve  7/12/2020 1051 by Rayleen Gaucher, RN  Outcome: Ongoing  7/12/2020 0113 by Grethcen Henriquez RN  Outcome: Ongoing  Goal: Will show no signs and symptoms of electrolyte imbalance  Description: Will show no signs and symptoms of electrolyte imbalance  7/12/2020 1051 by Rayleen Gaucher, RN  Outcome: Ongoing  7/12/2020 0113 by Gretchen Henriquez RN  Outcome: Ongoing  Goal: Complications related to the disease process, condition or treatment will be avoided or minimized  Description: Complications related to the disease process, condition or treatment will be avoided or minimized  7/12/2020 1051 by Rayleen Gaucher, RN  Outcome: Ongoing  7/12/2020 0113 by Gretchen Henriquez RN  Outcome: Ongoing     Problem:  Activity:  Goal: Fatigue will decrease  Description: Fatigue will decrease  7/12/2020 1051 by Rayleen Gaucher, RN  Outcome: Ongoing  7/12/2020 0113 by Gretchen Henriquez RN  Outcome: Ongoing  Goal: Risk for activity intolerance will decrease  Description: Risk for activity intolerance will decrease  7/12/2020 1051 by Rayleen Gaucher, RN  Outcome: Ongoing  7/12/2020 0113 by Gretchen Henriquez RN  Outcome: Ongoing     Problem: Coping:  Goal: Ability to cope will improve  Description: Ability to cope will improve  7/12/2020 1051 by Rayleen Gaucher, RN  Outcome: Ongoing  7/12/2020 0113 by Gretchen Henriquez RN  Outcome: Ongoing     Problem: Health Behavior:  Goal: Ability to manage health-related needs will improve  Description: Ability to manage health-related needs will improve  7/12/2020 1051 by Rayleen Gaucher, RN  Outcome: Ongoing  7/12/2020 0113 by Gretchen Henriquez RN  Outcome: Ongoing  Goal: Identification of resources available to assist in

## 2020-07-13 ENCOUNTER — CARE COORDINATION (OUTPATIENT)
Dept: CASE MANAGEMENT | Age: 60
End: 2020-07-13

## 2020-07-13 ENCOUNTER — TELEPHONE (OUTPATIENT)
Dept: INTERNAL MEDICINE | Age: 60
End: 2020-07-13

## 2020-07-13 LAB
EKG P AXIS: 57 DEGREES
EKG P-R INTERVAL: 140 MS
EKG Q-T INTERVAL: 394 MS
EKG QRS DURATION: 82 MS
EKG QTC CALCULATION (BAZETT): 422 MS
EKG T AXIS: 128 DEGREES

## 2020-07-13 PROCEDURE — 93010 ELECTROCARDIOGRAM REPORT: CPT | Performed by: INTERNAL MEDICINE

## 2020-07-13 NOTE — CARE COORDINATION
[facility] Transitions Initial Follow Up Call    Call within 2 business days of discharge: Yes    Patient: Amaya Beltrán Patient : 1960   MRN: [id number]  Reason for Admission: Fluid overload, ESRD, DM, elevated troponin  Discharge Date: 20 RARS: Readmission Risk Score: 30      Last Discharge [facility]       Complaint Diagnosis Description Type Department Provider    20 Shortness of Breath Hypervolemia, unspecified hypervolemia type . .. ED to Hosp-Admission (Discharged) (ADMITTED) [facility] Giselle Cade DO; Nahed Barba,... Spoke with: Joanna 86: [facility]     Non-face-to-face services provided:  Reviewed encounter information for continuity of care prior to follow up phone call - chart notes, consults, progress notes, test results, med list, appointments, AVS, other information. Care Transitions 24 Hour Call    Schedule Follow Up Appointment with PCP:  Declined  Do you have any ongoing symptoms?:  No  Do you have a copy of your discharge instructions?:  Yes  Do you have all of your prescriptions and are they filled?:  Yes  Have you been contacted by a [employer]?:  No  Were you discharged with any Home Care or Post Acute Services:  No  Do you feel like you have everything you need to keep you well at home?:  Yes  Care Transitions Interventions         Follow Up: Placed a call to the patient for an initial follow up call post discharge for CTC and COVID 19 Risk Monitoring. She was discharged 2020. She reported that she is doing some better. She said she is not having any breathing issues or signs of fluid overload this am.  She said she is just weak. She has not had any chest pain or congestion, cough, or other symptoms. She said she did not have any medication changes with this hospital stay. She did not want to review medications.   She does not have a follow up scheduled with PCP that I can see in the system, but she said she does have one scheduled. I offered to check on this to verify it and she said she would take care of it. Did discuss follow up as indicated for abnormal S&S, etc.  Did discuss CTC follow up as indicated. Reviewed COVID 19 info as below. Patient contacted regarding [facility]. Discussed COVID-19 related testing which was not done at this time. Test results were not done. Patient informed of results, if available? N/A    Care Transition Nurse contacted the patient by telephone to perform post discharge assessment. Verified name and  with patient as identifiers. Provided introduction to self, and explanation of the CTN role and reason for call due to risk factors for infection and/or exposure to COVID-19. Symptoms reviewed with patient who verbalized the following symptoms: no new symptoms. Due to no new or worsening symptoms encounter was not routed to provider for escalation. Discussed follow-up appointments. If no appointment was previously scheduled, appointment scheduling offered: Yes  [facility] follow up appointment(s): No future appointments. Non-[facility] follow up appointment(s): N/A     Patient has following risk factors of: ESRD, DM, elevated troponin, inpatient stay. CTN reviewed discharge instructions, medical action plan and red flags such as increased shortness of breath, increasing fever and signs of decompensation with patient who verbalized understanding. Discussed exposure protocols and quarantine with CDC Guidelines What to do if you are sick with coronavirus disease .  Patient was given an opportunity for questions and concerns. The patient agrees to contact the [facility] exposure line [phone], local health department [facility] Department of Health: ([phone]) and PCP office for questions related to their healthcare. CTN provided contact information for future needs.     Patient/family/caregiver given information for [employer] and agrees to enroll

## 2020-07-20 ENCOUNTER — CARE COORDINATION (OUTPATIENT)
Dept: CASE MANAGEMENT | Age: 60
End: 2020-07-20

## 2020-07-20 NOTE — CARE COORDINATION
[facility] 45 Transitions Follow Up Call    2020    Patient: Gala Buerger  Patient : 1960   MRN: [id number]  Reason for Admission:   Discharge Date: 20 RARS: Readmission Risk Score: 27         Spoke with: Jimmy 374 Transitions Subsequent and Final Call    Subsequent and Final Calls  Do you have any ongoing symptoms?:  No  Have your medications changed?:  No  Do you have any questions related to your medications?:  No  Do you currently have any active services?:  No  Do you have any needs or concerns that I can assist you with?:  No  Identified Barriers:  None  Care Transitions Interventions  Other Interventions: Follow Up : Spoke with patient today for follow up CTC/ COVID call. She says for the most part she is feeling good since getting home. She does feel like she is having some neuropathy in her legs. She follows up on Wednesday with her PCP and is going to discuss it then. She heads back to dialysis on , , and . She says her appetite is very good. No s/s of COVID-19 at this time. She was not tested this admission. Advised to call with any needs prn. Will follow up at a later time. Future Appointments   Date Time Provider Darrell Price   2020  9:00 AM [facility]     Patient contacted regarding COVID-19 risk and screening. Discussed COVID-19 related testing which was not done at this time. Test results were not done. Patient informed of results, if available? NA. Care Transition Nurse/ Ambulatory Care Manager contacted the patient by telephone to perform follow-up assessment. Verified name and  with patient as identifiers. Patient has following risk factors of: heart failure, diabetes and chronic kidney disease. Symptoms reviewed with patient who verbalized the following symptoms: no new symptoms and no worsening symptoms.       Due to no new or worsening symptoms encounter was not routed to provider for escalation. Education provided regarding infection prevention, and signs and symptoms of COVID-19 and when to seek medical attention with patient who verbalized understanding. Discussed exposure protocols and quarantine from [address] you at higher risk for severe illness 2019 and given an opportunity for questions and concerns. The patient agrees to contact the COVID-19 hotline [phone] or PCP office for questions related to their healthcare. CTN/ACM provided contact information for future reference. From CDC: Are you at higher risk for severe illness?  Wash your hands often.  Avoid close contact (6 feet, which is about two arm lengths) with people who are sick.  Put distance between yourself and other people if COVID-19 is spreading in your community.  Clean and disinfect frequently touched surfaces.  Avoid all cruise travel and non-essential air travel.  Call your healthcare professional if you have concerns about COVID-19 and your underlying condition or if you are sick. For more information on steps you can take to protect yourself, see CDC's How to [address] [place] [address] for follow-up call in 5-7 days based on severity of symptoms and risk factors.     Ananth Larsen RN

## 2020-07-27 ENCOUNTER — CARE COORDINATION (OUTPATIENT)
Dept: CASE MANAGEMENT | Age: 60
End: 2020-07-27

## 2020-07-27 NOTE — CARE COORDINATION
[facility] 45 Transitions Follow Up Call    2020    Patient: Vanessa Nicole  Patient : 1960   MRN: [id number]  Reason for Admission:   Discharge Date: 20 RARS: Readmission Risk Score: 27         Spoke with: N/A    Care Transitions Subsequent and Final Call    Subsequent and Final Calls  Care Transitions Interventions  Other Interventions: Follow Up : Attempted to make contact with patient for CTC/COVID call with no answer. Unable to leave message or CTN contact info. Will try back at a later time. No future appointments.     Marcia Mcardle, RN

## 2020-07-28 ENCOUNTER — CARE COORDINATION (OUTPATIENT)
Dept: CASE MANAGEMENT | Age: 60
End: 2020-07-28

## 2020-07-28 NOTE — CARE COORDINATION
[facility] Transitions Follow Up Call    2020    Patient: Michelle Crowley  Patient : 1960   MRN: [id number]  Reason for Admission:   Discharge Date: 20 RARS: Readmission Risk Score: 30         Spoke with: [address] [place], [place] [id number] Transitions Subsequent and Final Call    Subsequent and Final Calls  Care Transitions Interventions  Other Interventions: Follow Up : Attempted to make contact with Romy Morales for CTC/Covid call without success. Unable to leave a message regarding intent of call and call back information. Will try again my next business day. No future appointments.     Ata Earl RN

## 2020-07-29 ENCOUNTER — CARE COORDINATION (OUTPATIENT)
Dept: CASE MANAGEMENT | Age: 60
End: 2020-07-29

## 2020-07-29 NOTE — CARE COORDINATION
[facility] Transitions Follow Up Call    2020    Patient: David Rodríguez  Patient : 1960   MRN: [id number]  Reason for Admission:   Discharge Date: 20 RARS: Readmission Risk Score: 27         Spoke with: N/A    Care Transitions Subsequent and Final Call    Subsequent and Final Calls  Care Transitions Interventions  Other Interventions: Follow Up : Attempt #3 to make contact with patient for CTC/COVID-19 call with no answer. Unable to leave message or CTN contact information. Will discharge from CTN services at this time due to inability to make contact with patient. No future appointments.     Anuradha Marcano RN

## 2020-08-07 ENCOUNTER — VIRTUAL VISIT (OUTPATIENT)
Dept: PRIMARY CARE CLINIC | Age: 60
End: 2020-08-07
Payer: MEDICARE

## 2020-08-07 PROCEDURE — 99443 PR PHYS/QHP TELEPHONE EVALUATION 21-30 MIN: CPT | Performed by: NURSE PRACTITIONER

## 2020-08-07 RX ORDER — GABAPENTIN 100 MG/1
100 CAPSULE ORAL 2 TIMES DAILY
Qty: 60 CAPSULE | Refills: 1 | Status: ON HOLD | OUTPATIENT
Start: 2020-08-07 | End: 2020-09-03 | Stop reason: SDUPTHER

## 2020-08-07 NOTE — PROGRESS NOTES
[address] [address]            Phone:  [phone]  Fax:  [phone]    Kaitlin Lowe is a 56 y.o. female evaluated via telephone on 8/7/2020. Consent:    She and/or health care decision maker is aware that that she may receive a bill for this telephone service, depending on her insurance coverage, and has provided verbal consent to proceed: Yes      HPI  Chief Complaint   Patient presents with    Leg Pain       I communicated with the patient and/or health care decision maker about possible restless legs. She reports having burning sensation throughout her legs throughout the day. She is also having increased hallucinations. She reports ever since taking the Trazodone she has had these intermittent visual hallucinations. She denies auditory hallucinations. She denies anxiety or depression. Review of Systems   Constitutional: Negative. HENT: Negative. Eyes: Negative. Respiratory: Negative. Cardiovascular: Negative. Gastrointestinal: Negative. Endocrine: Negative. Genitourinary: Negative. Musculoskeletal: Positive for myalgias (bilateral leg pain). Skin: Negative. Neurological: Negative. Hematological: Negative. Psychiatric/Behavioral: Positive for hallucinations (intermittment). PLAN    Details of this discussion including any medical advice provided:     1. Claudication ([address]    - VL MANDO BILATERAL LIMITED 1-2 LEVELS; Future    2. Neuropathy    - gabapentin (NEURONTIN) 100 MG capsule; Take 1 capsule by mouth 2 times daily for 30 days. Dispense: 60 capsule; Refill: 1    3. Hallucinations      I am not sure what the cause of hallucinations. Ordering ABIs will call with results. I believe symptoms are related to neuropathy. I am starting her on Neurontin to see if this will help with her symptoms.         I affirm this is a Patient Initiated Episode with an Established Patient who has not had a related

## 2020-08-10 RX ORDER — FAMOTIDINE 20 MG/1
TABLET, FILM COATED ORAL
Qty: 60 TABLET | Refills: 11 | Status: SHIPPED | OUTPATIENT
Start: 2020-08-10

## 2020-08-11 PROBLEM — R79.89 ELEVATED TROPONIN: Status: RESOLVED | Noted: 2020-07-11 | Resolved: 2020-08-11

## 2020-08-11 PROBLEM — R77.8 ELEVATED TROPONIN: Status: RESOLVED | Noted: 2020-07-11 | Resolved: 2020-08-11

## 2020-08-12 ASSESSMENT — ENCOUNTER SYMPTOMS
EYES NEGATIVE: 1
RESPIRATORY NEGATIVE: 1
GASTROINTESTINAL NEGATIVE: 1

## 2020-09-01 ENCOUNTER — HOSPITAL ENCOUNTER (INPATIENT)
Age: 60
LOS: 2 days | Discharge: HOME | DRG: 091 | End: 2020-09-03
Attending: EMERGENCY MEDICINE | Admitting: FAMILY MEDICINE
Payer: MEDICARE

## 2020-09-01 ENCOUNTER — APPOINTMENT (OUTPATIENT)
Dept: CT IMAGING | Age: 60
DRG: 091 | End: 2020-09-01
Payer: MEDICARE

## 2020-09-01 ENCOUNTER — APPOINTMENT (OUTPATIENT)
Dept: GENERAL RADIOLOGY | Age: 60
DRG: 091 | End: 2020-09-01
Payer: MEDICARE

## 2020-09-01 PROBLEM — J96.02 ACUTE RESPIRATORY FAILURE WITH HYPOXIA AND HYPERCARBIA (HCC): Status: ACTIVE | Noted: 2020-09-01

## 2020-09-01 PROBLEM — J96.01 ACUTE RESPIRATORY FAILURE WITH HYPOXIA AND HYPERCARBIA (HCC): Status: ACTIVE | Noted: 2020-09-01

## 2020-09-01 LAB
ALBUMIN SERPL-MCNC: 3.5 G/DL (ref 3.5–5.2)
ALP BLD-CCNC: 496 U/L (ref 35–104)
ALT SERPL-CCNC: 11 U/L (ref 5–33)
AMMONIA: 19 UMOL/L (ref 11–51)
AMPHETAMINE SCREEN, URINE: NEGATIVE
ANION GAP SERPL CALCULATED.3IONS-SCNC: 16 MMOL/L (ref 7–19)
AST SERPL-CCNC: 20 U/L (ref 5–32)
BACTERIA: ABNORMAL /HPF
BARBITURATE SCREEN URINE: NEGATIVE
BASE EXCESS ARTERIAL: 6.7 MMOL/L (ref -2–2)
BASOPHILS ABSOLUTE: 0 K/UL (ref 0–0.2)
BASOPHILS RELATIVE PERCENT: 0.6 % (ref 0–1)
BENZODIAZEPINE SCREEN, URINE: NEGATIVE
BILIRUB SERPL-MCNC: 0.7 MG/DL (ref 0.2–1.2)
BILIRUBIN URINE: NEGATIVE
BLOOD, URINE: ABNORMAL
BUN BLDV-MCNC: 36 MG/DL (ref 8–23)
CALCIUM SERPL-MCNC: 9.4 MG/DL (ref 8.8–10.2)
CANNABINOID SCREEN URINE: NEGATIVE
CARBOXYHEMOGLOBIN ARTERIAL: 7.8 % (ref 0–5)
CHLORIDE BLD-SCNC: 84 MMOL/L (ref 98–111)
CLARITY: ABNORMAL
CO2: 30 MMOL/L (ref 22–29)
COCAINE METABOLITE SCREEN URINE: NEGATIVE
COLOR: ABNORMAL
CREAT SERPL-MCNC: 5.1 MG/DL (ref 0.5–0.9)
EKG P AXIS: 62 DEGREES
EKG P-R INTERVAL: 146 MS
EKG Q-T INTERVAL: 426 MS
EKG QRS DURATION: 82 MS
EKG QTC CALCULATION (BAZETT): 440 MS
EKG T AXIS: 15 DEGREES
EOSINOPHILS ABSOLUTE: 0.2 K/UL (ref 0–0.6)
EOSINOPHILS RELATIVE PERCENT: 3.8 % (ref 0–5)
EPITHELIAL CELLS, UA: ABNORMAL /HPF
ETHANOL: <10 MG/DL (ref 0–0.08)
GFR AFRICAN AMERICAN: 10
GFR NON-AFRICAN AMERICAN: 9
GLUCOSE BLD-MCNC: 236 MG/DL (ref 70–99)
GLUCOSE BLD-MCNC: 311 MG/DL (ref 74–109)
GLUCOSE BLD-MCNC: 312 MG/DL (ref 70–99)
GLUCOSE URINE: 250 MG/DL
HCO3 ARTERIAL: 32.4 MMOL/L (ref 22–26)
HCT VFR BLD CALC: 39.4 % (ref 37–47)
HEMOGLOBIN, ART, EXTENDED: 12.4 G/DL (ref 12–16)
HEMOGLOBIN: 12 G/DL (ref 12–16)
IMMATURE GRANULOCYTES #: 0 K/UL
INR BLD: 1.17 (ref 0.88–1.18)
KETONES, URINE: NEGATIVE MG/DL
LEUKOCYTE ESTERASE, URINE: NEGATIVE
LYMPHOCYTES ABSOLUTE: 0.7 K/UL (ref 1.1–4.5)
LYMPHOCYTES RELATIVE PERCENT: 14.7 % (ref 20–40)
Lab: NORMAL
MCH RBC QN AUTO: 27.5 PG (ref 27–31)
MCHC RBC AUTO-ENTMCNC: 30.5 G/DL (ref 33–37)
MCV RBC AUTO: 90.4 FL (ref 81–99)
METHEMOGLOBIN ARTERIAL: 1 %
MONOCYTES ABSOLUTE: 0.5 K/UL (ref 0–0.9)
MONOCYTES RELATIVE PERCENT: 9.1 % (ref 0–10)
NEUTROPHILS ABSOLUTE: 3.6 K/UL (ref 1.5–7.5)
NEUTROPHILS RELATIVE PERCENT: 71.6 % (ref 50–65)
NITRITE, URINE: NEGATIVE
O2 CONTENT ARTERIAL: 15.1 ML/DL
O2 SAT, ARTERIAL: 86.5 %
O2 THERAPY: ABNORMAL
OPIATE SCREEN URINE: NEGATIVE
PCO2 ARTERIAL: 50 MMHG (ref 35–45)
PDW BLD-RTO: 16.7 % (ref 11.5–14.5)
PERFORMED ON: ABNORMAL
PERFORMED ON: ABNORMAL
PH ARTERIAL: 7.42 (ref 7.35–7.45)
PH UA: 7.5 (ref 5–8)
PLATELET # BLD: 180 K/UL (ref 130–400)
PMV BLD AUTO: 9.6 FL (ref 9.4–12.3)
PO2 ARTERIAL: 67 MMHG (ref 80–100)
POTASSIUM SERPL-SCNC: 4.6 MMOL/L (ref 3.5–5)
POTASSIUM, WHOLE BLOOD: 4.6
PROTEIN UA: >=1000 MG/DL
PROTHROMBIN TIME: 14.9 SEC (ref 12–14.6)
RBC # BLD: 4.36 M/UL (ref 4.2–5.4)
RBC UA: ABNORMAL /HPF (ref 0–2)
SODIUM BLD-SCNC: 130 MMOL/L (ref 136–145)
SPECIFIC GRAVITY UA: 1.01 (ref 1–1.03)
TOTAL CK: 50 U/L (ref 26–192)
TOTAL PROTEIN: 6.9 G/DL (ref 6.6–8.7)
UROBILINOGEN, URINE: 0.2 E.U./DL
WBC # BLD: 5 K/UL (ref 4.8–10.8)
WBC UA: ABNORMAL /HPF (ref 0–5)

## 2020-09-01 PROCEDURE — 85025 COMPLETE CBC W/AUTO DIFF WBC: CPT

## 2020-09-01 PROCEDURE — 80307 DRUG TEST PRSMV CHEM ANLYZR: CPT

## 2020-09-01 PROCEDURE — 2700000000 HC OXYGEN THERAPY PER DAY

## 2020-09-01 PROCEDURE — 99282 EMERGENCY DEPT VISIT SF MDM: CPT

## 2020-09-01 PROCEDURE — 82140 ASSAY OF AMMONIA: CPT

## 2020-09-01 PROCEDURE — 1210000000 HC MED SURG R&B

## 2020-09-01 PROCEDURE — 8010000000 HC HEMODIALYSIS ACUTE INPT

## 2020-09-01 PROCEDURE — 36600 WITHDRAWAL OF ARTERIAL BLOOD: CPT

## 2020-09-01 PROCEDURE — 70450 CT HEAD/BRAIN W/O DYE: CPT

## 2020-09-01 PROCEDURE — 99283 EMERGENCY DEPT VISIT LOW MDM: CPT

## 2020-09-01 PROCEDURE — G0480 DRUG TEST DEF 1-7 CLASSES: HCPCS

## 2020-09-01 PROCEDURE — 82550 ASSAY OF CK (CPK): CPT

## 2020-09-01 PROCEDURE — 80053 COMPREHEN METABOLIC PANEL: CPT

## 2020-09-01 PROCEDURE — G0257 UNSCHED DIALYSIS ESRD PT HOS: HCPCS

## 2020-09-01 PROCEDURE — 71045 X-RAY EXAM CHEST 1 VIEW: CPT

## 2020-09-01 PROCEDURE — 93010 ELECTROCARDIOGRAM REPORT: CPT | Performed by: INTERNAL MEDICINE

## 2020-09-01 PROCEDURE — 81001 URINALYSIS AUTO W/SCOPE: CPT

## 2020-09-01 PROCEDURE — 6370000000 HC RX 637 (ALT 250 FOR IP): Performed by: FAMILY MEDICINE

## 2020-09-01 PROCEDURE — 82947 ASSAY GLUCOSE BLOOD QUANT: CPT

## 2020-09-01 PROCEDURE — 85610 PROTHROMBIN TIME: CPT

## 2020-09-01 PROCEDURE — 84132 ASSAY OF SERUM POTASSIUM: CPT

## 2020-09-01 PROCEDURE — 82803 BLOOD GASES ANY COMBINATION: CPT

## 2020-09-01 PROCEDURE — 36415 COLL VENOUS BLD VENIPUNCTURE: CPT

## 2020-09-01 PROCEDURE — 6360000002 HC RX W HCPCS: Performed by: FAMILY MEDICINE

## 2020-09-01 PROCEDURE — 93005 ELECTROCARDIOGRAM TRACING: CPT | Performed by: EMERGENCY MEDICINE

## 2020-09-01 RX ORDER — POTASSIUM CHLORIDE 20 MEQ/1
40 TABLET, EXTENDED RELEASE ORAL PRN
Status: DISCONTINUED | OUTPATIENT
Start: 2020-09-01 | End: 2020-09-03 | Stop reason: HOSPADM

## 2020-09-01 RX ORDER — FLUTICASONE PROPIONATE 50 MCG
2 SPRAY, SUSPENSION (ML) NASAL DAILY
Status: DISCONTINUED | OUTPATIENT
Start: 2020-09-01 | End: 2020-09-03 | Stop reason: HOSPADM

## 2020-09-01 RX ORDER — NICOTINE 21 MG/24HR
1 PATCH, TRANSDERMAL 24 HOURS TRANSDERMAL DAILY
Status: DISCONTINUED | OUTPATIENT
Start: 2020-09-01 | End: 2020-09-03 | Stop reason: HOSPADM

## 2020-09-01 RX ORDER — HEPARIN SODIUM 5000 [USP'U]/ML
5000 INJECTION, SOLUTION INTRAVENOUS; SUBCUTANEOUS EVERY 8 HOURS SCHEDULED
Status: DISCONTINUED | OUTPATIENT
Start: 2020-09-01 | End: 2020-09-03 | Stop reason: HOSPADM

## 2020-09-01 RX ORDER — INSULIN GLARGINE 100 [IU]/ML
10 INJECTION, SOLUTION SUBCUTANEOUS NIGHTLY
Status: DISCONTINUED | OUTPATIENT
Start: 2020-09-01 | End: 2020-09-03 | Stop reason: HOSPADM

## 2020-09-01 RX ORDER — PROMETHAZINE HYDROCHLORIDE 25 MG/1
12.5 TABLET ORAL EVERY 6 HOURS PRN
Status: DISCONTINUED | OUTPATIENT
Start: 2020-09-01 | End: 2020-09-03 | Stop reason: HOSPADM

## 2020-09-01 RX ORDER — DICYCLOMINE HYDROCHLORIDE 10 MG/1
10 CAPSULE ORAL 4 TIMES DAILY
Status: DISCONTINUED | OUTPATIENT
Start: 2020-09-01 | End: 2020-09-03 | Stop reason: HOSPADM

## 2020-09-01 RX ORDER — LACTULOSE 10 G/15ML
30 SOLUTION ORAL 3 TIMES DAILY
Status: DISCONTINUED | OUTPATIENT
Start: 2020-09-01 | End: 2020-09-03 | Stop reason: HOSPADM

## 2020-09-01 RX ORDER — SODIUM CHLORIDE 0.9 % (FLUSH) 0.9 %
10 SYRINGE (ML) INJECTION EVERY 12 HOURS SCHEDULED
Status: DISCONTINUED | OUTPATIENT
Start: 2020-09-01 | End: 2020-09-03 | Stop reason: HOSPADM

## 2020-09-01 RX ORDER — NICOTINE POLACRILEX 4 MG
15 LOZENGE BUCCAL PRN
Status: DISCONTINUED | OUTPATIENT
Start: 2020-09-01 | End: 2020-09-03 | Stop reason: HOSPADM

## 2020-09-01 RX ORDER — ACETAMINOPHEN 325 MG/1
650 TABLET ORAL EVERY 6 HOURS PRN
Status: DISCONTINUED | OUTPATIENT
Start: 2020-09-01 | End: 2020-09-03 | Stop reason: HOSPADM

## 2020-09-01 RX ORDER — PRAVASTATIN SODIUM 10 MG
10 TABLET ORAL NIGHTLY
Status: DISCONTINUED | OUTPATIENT
Start: 2020-09-01 | End: 2020-09-03 | Stop reason: HOSPADM

## 2020-09-01 RX ORDER — MIDODRINE HYDROCHLORIDE 5 MG/1
2.5 TABLET ORAL
Status: ACTIVE | OUTPATIENT
Start: 2020-09-01 | End: 2020-09-02

## 2020-09-01 RX ORDER — DEXTROSE MONOHYDRATE 50 MG/ML
100 INJECTION, SOLUTION INTRAVENOUS PRN
Status: DISCONTINUED | OUTPATIENT
Start: 2020-09-01 | End: 2020-09-03 | Stop reason: HOSPADM

## 2020-09-01 RX ORDER — IPRATROPIUM BROMIDE AND ALBUTEROL SULFATE 2.5; .5 MG/3ML; MG/3ML
1 SOLUTION RESPIRATORY (INHALATION)
Status: DISCONTINUED | OUTPATIENT
Start: 2020-09-01 | End: 2020-09-03 | Stop reason: HOSPADM

## 2020-09-01 RX ORDER — SEVELAMER CARBONATE 800 MG/1
800 TABLET, FILM COATED ORAL DAILY
Status: DISCONTINUED | OUTPATIENT
Start: 2020-09-01 | End: 2020-09-03 | Stop reason: HOSPADM

## 2020-09-01 RX ORDER — DEXTROSE MONOHYDRATE 25 G/50ML
12.5 INJECTION, SOLUTION INTRAVENOUS PRN
Status: DISCONTINUED | OUTPATIENT
Start: 2020-09-01 | End: 2020-09-03 | Stop reason: HOSPADM

## 2020-09-01 RX ORDER — ONDANSETRON 2 MG/ML
4 INJECTION INTRAMUSCULAR; INTRAVENOUS EVERY 6 HOURS PRN
Status: DISCONTINUED | OUTPATIENT
Start: 2020-09-01 | End: 2020-09-03 | Stop reason: HOSPADM

## 2020-09-01 RX ORDER — ASPIRIN 81 MG/1
81 TABLET ORAL DAILY
Status: DISCONTINUED | OUTPATIENT
Start: 2020-09-02 | End: 2020-09-03 | Stop reason: HOSPADM

## 2020-09-01 RX ORDER — LATANOPROST 50 UG/ML
1 SOLUTION/ DROPS OPHTHALMIC NIGHTLY
Status: DISCONTINUED | OUTPATIENT
Start: 2020-09-01 | End: 2020-09-03 | Stop reason: HOSPADM

## 2020-09-01 RX ORDER — MAGNESIUM SULFATE IN WATER 40 MG/ML
2 INJECTION, SOLUTION INTRAVENOUS PRN
Status: DISCONTINUED | OUTPATIENT
Start: 2020-09-01 | End: 2020-09-03 | Stop reason: HOSPADM

## 2020-09-01 RX ORDER — POTASSIUM CHLORIDE 7.45 MG/ML
10 INJECTION INTRAVENOUS PRN
Status: DISCONTINUED | OUTPATIENT
Start: 2020-09-01 | End: 2020-09-03 | Stop reason: HOSPADM

## 2020-09-01 RX ORDER — ALBUTEROL SULFATE 2.5 MG/3ML
2.5 SOLUTION RESPIRATORY (INHALATION) EVERY 6 HOURS PRN
Status: DISCONTINUED | OUTPATIENT
Start: 2020-09-01 | End: 2020-09-03 | Stop reason: HOSPADM

## 2020-09-01 RX ORDER — POLYETHYLENE GLYCOL 3350 17 G/17G
17 POWDER, FOR SOLUTION ORAL DAILY PRN
Status: DISCONTINUED | OUTPATIENT
Start: 2020-09-01 | End: 2020-09-03 | Stop reason: HOSPADM

## 2020-09-01 RX ORDER — LOSARTAN POTASSIUM 50 MG/1
50 TABLET ORAL 2 TIMES DAILY
Status: DISCONTINUED | OUTPATIENT
Start: 2020-09-01 | End: 2020-09-03 | Stop reason: HOSPADM

## 2020-09-01 RX ORDER — BRIMONIDINE TARTRATE 2 MG/ML
1 SOLUTION/ DROPS OPHTHALMIC 2 TIMES DAILY
Status: DISCONTINUED | OUTPATIENT
Start: 2020-09-01 | End: 2020-09-03 | Stop reason: HOSPADM

## 2020-09-01 RX ORDER — CARVEDILOL 12.5 MG/1
12.5 TABLET ORAL 2 TIMES DAILY WITH MEALS
Status: DISCONTINUED | OUTPATIENT
Start: 2020-09-01 | End: 2020-09-03 | Stop reason: HOSPADM

## 2020-09-01 RX ORDER — SODIUM CHLORIDE 0.9 % (FLUSH) 0.9 %
10 SYRINGE (ML) INJECTION PRN
Status: DISCONTINUED | OUTPATIENT
Start: 2020-09-01 | End: 2020-09-03 | Stop reason: HOSPADM

## 2020-09-01 RX ORDER — ACETAMINOPHEN 650 MG/1
650 SUPPOSITORY RECTAL EVERY 6 HOURS PRN
Status: DISCONTINUED | OUTPATIENT
Start: 2020-09-01 | End: 2020-09-03 | Stop reason: HOSPADM

## 2020-09-01 RX ADMIN — HEPARIN SODIUM 5000 UNITS: 5000 INJECTION INTRAVENOUS; SUBCUTANEOUS at 22:09

## 2020-09-01 RX ADMIN — Medication 10 UNITS: at 22:13

## 2020-09-01 RX ADMIN — FLUTICASONE PROPIONATE 2 SPRAY: 50 SPRAY, METERED NASAL at 21:59

## 2020-09-01 RX ADMIN — LATANOPROST 1 DROP: 50 SOLUTION OPHTHALMIC at 22:06

## 2020-09-01 RX ADMIN — LOSARTAN POTASSIUM 50 MG: 50 TABLET, FILM COATED ORAL at 21:58

## 2020-09-01 RX ADMIN — BRIMONIDINE TARTRATE 1 DROP: 2 SOLUTION OPHTHALMIC at 23:29

## 2020-09-01 RX ADMIN — DICYCLOMINE HYDROCHLORIDE 10 MG: 10 CAPSULE ORAL at 21:58

## 2020-09-01 RX ADMIN — PRAVASTATIN SODIUM 10 MG: 10 TABLET ORAL at 21:58

## 2020-09-01 RX ADMIN — CARVEDILOL 12.5 MG: 12.5 TABLET, FILM COATED ORAL at 21:58

## 2020-09-01 RX ADMIN — INSULIN LISPRO 2 UNITS: 100 INJECTION, SOLUTION INTRAVENOUS; SUBCUTANEOUS at 22:17

## 2020-09-01 ASSESSMENT — ENCOUNTER SYMPTOMS
ABDOMINAL PAIN: 0
CHOKING: 0
BLOOD IN STOOL: 0
SINUS PRESSURE: 0
WHEEZING: 0
COLOR CHANGE: 0
VOICE CHANGE: 0
SHORTNESS OF BREATH: 0
NAUSEA: 0
SORE THROAT: 0
FACIAL SWELLING: 0
SHORTNESS OF BREATH: 1
CONSTIPATION: 0
DIARRHEA: 0
VOMITING: 0
APNEA: 0
EYE DISCHARGE: 0

## 2020-09-01 NOTE — ED PROVIDER NOTES
[facility]/VAS/MED  eMERGENCY dEPARTMENT eNCOUnter      Pt Name: Kelley Sharma  MRN: [id number]  [place] 1960  Date of evaluation: 9/1/2020  Provider: Francesco Saint, MD    [address] [place] [address]       Chief Complaint   Patient presents with    Altered Mental Status     ams and confusion beginning 2-3 days ago. pt a tu/th/sat dialysis pt, last dialyzed on saturday.  pta this am. pt is alert and oriented x 4, but drowsy. pt started new meds (gabapentin) 1 month ago    Fatigue         HISTORY OF PRESENT ILLNESS   (Location/Symptom, Timing/Onset,Context/Setting, Quality, Duration, Modifying Factors, Severity)  Note limiting factors. Kelley Sharma is a 56 y.o. female who presents to the emergency department evaluation of altered mental status. 70-year-old female. Hemodialysis patient. Most of the history from her spouse. He reports over the last 2 weeks she has had some increased confusion. She has had hallucinations but usually mild. She had a fall 2 weeks ago without head injury. She started using a walker and now she has to have a wheelchair to get to her [address] [place] [address] dialysis appointment. She was started on gabapentin for neuropathy lower extremity pain.  guarantees me she has not been taking an appropriate medication. But he puts it out for her. He is very attentive. No known infections or fevers. No strokelike symptoms no unilateral weakness. She was dialyzed today but because of her health concerns they are here. The history is provided by the patient, the spouse and medical records. NursingNotes were reviewed. REVIEW OF SYSTEMS    (2-9 systems for level 4, 10 or more for level 5)     Review of Systems   Constitutional: Positive for fatigue. Negative for chills and fever. HENT: Negative for congestion, drooling, facial swelling, nosebleeds, sinus pressure, sore throat and voice change. Eyes: Negative for discharge.    Respiratory: Negative for apnea, choking and shortness of breath. Cardiovascular: Negative for chest pain and leg swelling. Gastrointestinal: Negative for abdominal pain, blood in stool, constipation, diarrhea and nausea. Genitourinary: Negative for enuresis. She does not make urine. Musculoskeletal: Negative for joint swelling. Skin: Negative for rash and wound. Neurological: Negative for seizures and syncope. Psychiatric/Behavioral: Positive for confusion, hallucinations and sleep disturbance. Negative for behavioral problems and suicidal ideas. All other systems reviewed and are negative. A complete review of systems was performed and is negative except as noted above in the HPI.        PAST MEDICAL HISTORY     Past Medical History:   Diagnosis Date    Blind right eye     partial vision loss in the L eye too, due to DM    Blindness of one eye     Right     [facility]est[facility] failure) [facility]     CKD (chronic kidney disease), stage IV ([facility])     secondary to diabetic nephropathy    [facility]     Diabetes mellitus with ophthalmic manifestations     dx'd in 2000 but likely ongoing before that, was dx'd when she went blind in the R eye    Glaucoma     [facility]     dialysis tues, wilian, sat. [facility]    Hypertension     2000, dx'd at same time as the DM    Obesity     Renal osteodystrophy     Smoker     Type II or unspecified type diabetes mellitus with renal manifestations, uncontrolled(250.42)          SURGICAL HISTORY       Past Surgical History:   Procedure Laterality Date    CARPAL TUNNEL RELEASE      CATARACT REMOVAL     [address] [place], and 1979    CHOLECYSTECTOMY      COLONOSCOPY N/A 3/9/2018    Dr ReevesWysxiz-Edmooaazorlgzr-Wqzukfvgzderf AP (-) dysplasia x 1, tubular AP x  (-) dysplasia x 1, HP x 4--1 yr recall    DIALYSIS FISTULA CREATION Left 4/23/15 [facility]    Left proximal ulnar artery to cephalic vein AVF creation    EYE SURGERY      laser, several times     TYMPANOSTOMY TUBE PLACEMENT Bilateral     VASCULAR SURGERY Left 5/11/15 [facility]    US-guided cannulation left distal upper arm cephalic vein with 4-Faroese glide sheath; LUE AV f'grams with venography SVC; left cephalic vein BAM with [id number] julieth balloon; completion venogram [facility]    VASCULAR SURGERY Left 5/28/15 [facility]    Percutaneous cannulation left distal radial artery with 4-Faroese glide sheath; LUE AV f'grams with venography SVC; left cephalic vein balloon a'plasty with [id number] julieth balloon and [id number] julieth balloon; proximal and mid upper arm cephalic vein BAM with [id number] julieth balloon; completion venogram [facility]    VASCULAR SURGERY Left 6/15/15 [facility]    US-guided cannulation left cephalic vein with 4-Faroese and later 7-Faroese sheath; LUE AV f'grams including venography SVC; left cephalic vein stenosis balloon a'plasty with [id number] cutting balloon; completion f'gram and venogram [facility]    VASCULAR SURGERY  05/15/2017    [facility]. Left upper fistulograms/venograms.  VASCULAR SURGERY  02/14/2018    [facility]. Left upper fistulograms, left subclavian BA 12x40 atlas, left cephalic arch BA 22I51 conquest.    VASCULAR SURGERY  02/13/2019    [facility]. Left upper fistulograms,BA left SCV 8x40 conquest,stent left SCV, viabahn 13x50,left SCV stent BA 12x40 conquest.         CURRENT MEDICATIONS       Current Discharge Medication List      CONTINUE these medications which have NOT CHANGED    Details   famotidine (PEPCID) 20 MG tablet TAKE 1 TABLET BY MOUTH TWICE DAILY  Qty: 60 tablet, Refills: 11    Associated Diagnoses: Gastroesophageal reflux disease without esophagitis      gabapentin (NEURONTIN) 100 MG capsule Take 1 capsule by mouth 2 times daily for 30 days.   Qty: 60 capsule, Refills: 1    Associated Diagnoses: Neuropathy      insulin glargine (LANTUS SOLOSTAR) 100 UNIT/ML injection pen INJECT 10 UNITS INTO THE SKIN ONCE DAILY  Qty: 15 mL, Refills: 1      losartan (COZAAR) 50 MG tablet TAKE 1 TABLET BY MOUTH TWICE DAILY  Qty: 180 tablet, Refills: 0    Associated Diagnoses: Essential hypertension      pravastatin (PRAVACHOL) 10 MG tablet TAKE 1 TABLET BY MOUTH EVERY NIGHT AT BEDTIME  Qty: 30 tablet, Refills: 11    Associated Diagnoses: Mixed hyperlipidemia      ondansetron (ZOFRAN) 4 MG tablet Take 4 mg by mouth every 6 hours      ipratropium-albuterol (DUONEB) 0.5-2.5 (3) MG/3ML SOLN nebulizer solution Inhale 3 mLs into the lungs every 4 hours  Qty: 360 mL, Refills: 1      albuterol sulfate HFA (PROVENTIL HFA) 108 (90 Base) MCG/ACT inhaler Inhale 2 puffs into the lungs every 6 hours as needed for Wheezing  Qty: 1 Inhaler, Refills: 3      dicyclomine (BENTYL) 10 MG capsule Take 1 capsule by mouth 4 times daily  Qty: 120 capsule, Refills: 5    Associated Diagnoses: Abdominal cramping      B-D ULTRAFINE III SHORT PEN 31G X 8 MM MISC USE ONCE DAILY TO INJECT INSULIN DAILY  Qty: 100 each, Refills: 3      carvedilol (COREG) 12.5 MG tablet Take 12.5 mg by mouth 2 times daily (with meals)      lactulose (CHRONULAC) 10 GM/15ML solution TAKE 30 ML BY MOUTH THREE TIMES DAILY  Qty: 8108 mL, Refills: 1    Comments: **Patient requests 90 days supply**      Cholecalciferol (VITAMIN D3) 1000 units CAPS Take by mouth      Travoprost, BAK Free, (TRAVATAN Z) 0.004 % SOLN ophthalmic solution 1 drop nightly      midodrine (PROAMATINE) 5 MG tablet TK 1 T PO HALF WAY THROUGH TREATMENT AS DIRECTED      sevelamer (RENVELA) 800 MG tablet Take 1 tablet by mouth daily       aspirin EC 81 MG EC tablet Take 81 mg by mouth daily      Cetirizine HCl (ZYRTEC ALLERGY PO) Take 10 mg by mouth daily       fluticasone (FLONASE) 50 MCG/ACT nasal spray 2 sprays by Nasal route daily. To keep ears opened up  Qty: 1 Bottle, Refills: 11    Comments: Cancel any old Rx on this  Associated Diagnoses: Eustachian tube disorder; Middle ear effusion; Hearing difficulty; Dizziness      Blood Glucose Monitoring Suppl NIKOS by Does not apply route.  Pt will also need new lancet device if not included in kit. Qty: 1 Device, Refills: 0    Associated Diagnoses: Type II or unspecified type diabetes mellitus with ophthalmic manifestations, not stated as uncontrolled(250.50)      Glucose Blood (BLOOD GLUCOSE TEST STRIPS) STRP Check blood sugar twice a day for recent medication adjustments. Qty: 100 strip, Refills: 11    Associated Diagnoses: Type II or unspecified type diabetes mellitus with ophthalmic manifestations, not stated as uncontrolled(250.50)      bimatoprost 0.01 % SOLN Apply 1 drop to eye nightly. One drop in left eye at hs      Insulin Syringe-Needle U-100 (ACCUSURE INS SYR 1CC/31GX5/16\") 31G X 5/16\" 1 ML MISC by Does not apply route. Qty: 100 each, Refills: 3    Associated Diagnoses: Diabetes mellitus with ophthalmic manifestations; Chronic kidney disease, stage IV (severe) ([facility] Proteinuria      timolol (TIMOPTIC-XR) 0.5 % ophthalmic gel-forming Place 1 drop into the left eye 2 times daily       dorzolamide (TRUSOPT) 2 % ophthalmic solution Place 1 drop into the left eye 2 times daily       brimonidine (ALPHAGAN P) 0.15 % ophthalmic solution Place 1 drop into the left eye 3 times daily       homatropine 5 % ophthalmic solution Place 1 drop into the left eye daily              ALLERGIES     Bupropion; Sulfa antibiotics; Varenicline; Wellbutrin [bupropion hcl];  Azithromycin; and Penicillins    FAMILY HISTORY       Family History   Problem Relation Age of Onset    Heart Disease Mother     Heart Attack Mother     Glaucoma Sister     Diabetes Sister     Diabetes Brother     Kidney Disease Brother     Blindness Brother     Stroke Maternal Grandmother     Stroke Maternal Grandfather     Colon Cancer Neg Hx     Colon Polyps Neg Hx     Liver Cancer Neg Hx     Liver Disease Neg Hx     Esophageal Cancer Neg Hx     Rectal Cancer Neg Hx     Stomach Cancer Neg Hx           SOCIAL HISTORY       Social History     Socioeconomic History    Marital status:  Spouse name: None    Number of children: None    Years of education: None    Highest education level: None   Occupational History    None   Social Needs    Financial resource strain: None    Food insecurity     Worry: None     Inability: None    Transportation needs     Medical: None     Non-medical: None   Tobacco Use    Smoking status: Current Every Day Smoker     Packs/day: 1.00     Years: 40.00     Pack years: 40.00    Smokeless tobacco: Never Used   Substance and Sexual Activity    Alcohol use: No     Alcohol/week: 0.0 standard drinks    Drug use: Yes     Types: Marijuana    Sexual activity: Not Currently     Partners: Male   Lifestyle    Physical activity     Days per week: None     Minutes per session: None    Stress: None   Relationships    Social connections     Talks on phone: None     Gets together: None     Attends Yarsani service: None     Active member of club or organization: None     Attends meetings of clubs or organizations: None     Relationship status: None    Intimate partner violence     Fear of current or ex partner: None     Emotionally abused: None     Physically abused: None     Forced sexual activity: None   Other Topics Concern    None   Social History Narrative    None       SCREENINGS             PHYSICAL EXAM    (up to 7 for level 4, 8 or more for level 5)     ED Triage Vitals [09/01/20 1113]   BP Temp Temp Source Pulse Resp SpO2 Height Weight   (!) 151/71 98.4 °F (36.9 °C) Temporal 69 20 93 % -- 160 lb (72.6 kg)       Physical Exam  Vitals signs and nursing note reviewed. Constitutional:       General: She is sleeping. She is not in acute distress. Appearance: She is well-developed. HENT:      Head: Normocephalic and atraumatic. Right Ear: External ear normal.      Left Ear: External ear normal.      Mouth/Throat:      Pharynx: Oropharynx is clear. Eyes:      General: No scleral icterus.      Conjunctiva/sclera: Conjunctivae normal.      Pupils: none    LABS:  Labs Reviewed   COMPREHENSIVE METABOLIC PANEL - Abnormal; Notable for the following components:       Result Value    Sodium 130 (*)     Chloride 84 (*)     CO2 30 (*)     Glucose 311 (*)     BUN 36 (*)     CREATININE 5.1 (*)     GFR Non- 9 (*)     GFR African American 10 (*)     Alkaline Phosphatase 496 (*)     All other components within normal limits   CBC WITH AUTO DIFFERENTIAL - Abnormal; Notable for the following components:    MCHC 30.5 (*)     RDW 16.7 (*)     Neutrophils % 71.6 (*)     Lymphocytes % 14.7 (*)     Lymphocytes Absolute 0.7 (*)     All other components within normal limits   PROTIME-INR - Abnormal; Notable for the following components:    Protime 14.9 (*)     All other components within normal limits   URINE RT REFLEX TO CULTURE - Abnormal; Notable for the following components:    Clarity, UA CLOUDY (*)     Glucose, Ur 250 (*)     Blood, Urine SMALL (*)     All other components within normal limits   BLOOD GAS, ARTERIAL - Abnormal; Notable for the following components:    pCO2, Arterial 50.0 (*)     pO2, Arterial 67.0 (*)     HCO3, Arterial 32.4 (*)     Base Excess, Arterial 6.7 (*)     O2 Sat, Arterial 86.5 (*)     Carboxyhgb, Arterial 7.8 (*)     All other components within normal limits    Narrative:     CALL  Heck  Triston Ospina MD ER, 09/01/2020 13:37, by [address] - Abnormal; Notable for the following components:    WBC, UA 3-5 (*)     RBC, UA 3-5 (*)     Bacteria, UA None Seen (*)     All other components within normal limits   POCT GLUCOSE - Abnormal; Notable for the following components:    POC Glucose 312 (*)     All other components within normal limits   URINE DRUG SCREEN   ETHANOL   AMMONIA   POTASSIUM, WHOLE BLOOD   CK   POCT GLUCOSE       All other labs were within normal range or not returned as of this dictation.     EMERGENCY DEPARTMENT COURSE and DIFFERENTIALDIAGNOSIS/MDM:   Vitals:    Vitals:    09/01/20 1337

## 2020-09-01 NOTE — PROGRESS NOTES
4 Eyes Skin Assessment    Rodney Rendon is being assessed upon: Admission    I agree that I, Charsi Chaudhry, along with Andrea Gottron (either 2 RN's or 1 LPN and 1 RN) have performed a thorough Head to Toe Skin Assessment on the patient. ALL assessment sites listed below have been assessed. Areas assessed by both nurses:     [x]   Head, Face, and Ears   [x]   Shoulders, Back, and Chest  [x]   Arms, Elbows, and Hands   [x]   Coccyx, Sacrum, and Ischium  [x]   Legs, Feet, and Heels    Does the Patient have Skin Breakdown?  No    Jake Prevention initiated: Yes  Wound Care Orders initiated: No    [facility] nurse consulted for Pressure Injury (Stage 3,4, Unstageable, DTI, NWPT, and Complex wounds) and New or Established Ostomies: NA        Primary Nurse eSignature: Charis Chaudhry RN on 9/1/2020 at 4:48 PM      Co-Signer eSignature: Electronically signed by Spencer Wetzel RN on 9/1/20 at 4:53 PM CDT

## 2020-09-01 NOTE — H&P
recent history of falls, states she basically tripped over something.     Past Medical History     Past Medical History:   Diagnosis Date    Blind right eye     partial vision loss in the L eye too, due to DM    Blindness of one eye     Right     CHF (congestive heart failure) (HCC)     CKD (chronic kidney disease), stage IV (HCC)     secondary to diabetic nephropathy    Diabetes ([facility]     Diabetes mellitus with ophthalmic manifestations     dx'd in 2000 but likely ongoing before that, was dx'd when she went blind in the R eye    Glaucoma     [facility]     dialysis tues, thurs, sat. [address], [facility]    Hypertension     2000, dx'd at same time as the DM    Obesity     Renal osteodystrophy     Smoker     Type II or unspecified type diabetes mellitus with renal manifestations, uncontrolled(250.42)         Past Surgical History     Past Surgical History:   Procedure Laterality Date    CARPAL TUNNEL RELEASE      CATARACT REMOVAL     [address], and 1979    CHOLECYSTECTOMY      COLONOSCOPY N/A 3/9/2018    Dr ReevesUryfvy-Ailmtbllyquiar-Towqusnjjfsqs AP (-) dysplasia x 1, tubular AP x  (-) dysplasia x 1, HP x 4--1 yr recall    DIALYSIS FISTULA CREATION Left 4/23/15 [facility]    Left proximal ulnar artery to cephalic vein AVF creation    EYE SURGERY      laser, several times     TYMPANOSTOMY TUBE PLACEMENT Bilateral     VASCULAR SURGERY Left 5/11/15 [facility]    US-guided cannulation left distal upper arm cephalic vein with 4-Italian glide sheath; LUE AV f'grams with venography SVC; left cephalic vein BAM with [id number] julieth balloon; completion venogram LUE    VASCULAR SURGERY Left 5/28/15 [facility]    Percutaneous cannulation left distal radial artery with 4-Italian glide sheath; LUE AV f'grams with venography SVC; left cephalic vein balloon a'plasty with [id number] julieth balloon and [id number] julieth balloon; proximal and mid upper arm cephalic vein BAM with [id number] SHORT PEN 31G X 8 MM MISC USE ONCE DAILY TO INJECT INSULIN DAILY 3/16/20  Yes RAMONA Wheeler   carvedilol (COREG) 12.5 MG tablet Take 12.5 mg by mouth 2 times daily (with meals)   Yes Historical Provider, MD   lactulose (CHRONULAC) 10 GM/15ML solution TAKE 30 ML BY MOUTH THREE TIMES DAILY 9/25/19  Yes RAMONA Wheeler   Cholecalciferol (VITAMIN D3) 1000 units CAPS Take by mouth   Yes Historical Provider, MD   Travoprost, ALLISON Free, (TRAVATAN Z) 0.004 % SOLN ophthalmic solution 1 drop nightly   Yes Historical Provider, MD   midodrine (PROAMATINE) 5 MG tablet TK 1 T PO HALF WAY THROUGH TREATMENT AS DIRECTED 10/17/17  Yes Historical Provider, MD   sevelamer (RENVELA) 800 MG tablet Take 1 tablet by mouth daily    Yes Historical Provider, MD   aspirin EC 81 MG EC tablet Take 81 mg by mouth daily   Yes Historical Provider, MD   Cetirizine HCl (ZYRTEC ALLERGY PO) Take 10 mg by mouth daily    Yes Historical Provider, MD   fluticasone (FLONASE) 50 MCG/ACT nasal spray 2 sprays by Nasal route daily. To keep ears opened up 3/27/14  Yes Maco Minaya MD   Blood Glucose Monitoring Suppl NIKOS by Does not apply route. Pt will also need new lancet device if not included in kit. 12/9/13  Yes Maco Minaya MD   Glucose Blood (BLOOD GLUCOSE TEST STRIPS) STRP Check blood sugar twice a day for recent medication adjustments. 12/9/13  Yes Maco Minaya MD   bimatoprost 0.01 % SOLN Apply 1 drop to eye nightly. One drop in left eye at hs   Yes Historical Provider, MD   Insulin Syringe-Needle U-100 (ACCUSURE INS SYR 1CC/31GX5/16\") 31G X 5/16\" 1 ML MISC by Does not apply route.  9/25/12  Yes Maco Minaya MD   timolol (TIMOPTIC-XR) 0.5 % ophthalmic gel-forming Place 1 drop into the left eye 2 times daily  7/3/12  Yes Historical Provider, MD   dorzolamide (TRUSOPT) 2 % ophthalmic solution Place 1 drop into the left eye 2 times daily    Yes Historical Provider, MD   brimonidine (ALPHAGAN P) 0.15 % ophthalmic solution Place 1 drop into the left eye 3 times daily    Yes Historical Provider, MD   homatropine 5 % ophthalmic solution Place 1 drop into the left eye daily    Yes Historical Provider, MD   blood glucose monitor kit and supplies 1 kit by Other route 3 times daily Test three times a day & as needed for symptoms of irregular blood glucose. 7/11/18 3/6/19  RAMONA Sheldon        Allergies   Bupropion; Sulfa antibiotics; Varenicline; Wellbutrin [bupropion hcl]; Azithromycin; and Penicillins    Social History     Social History     Tobacco History     Smoking Status  Current Every Day Smoker Smoking Frequency  1 pack/day for 40 years (40 pk yrs)    Smokeless Tobacco Use  Never Used          Alcohol History     Alcohol Use Status  No          Drug Use     Drug Use Status  Yes Types  Marijuana          Sexual Activity     Sexually Active  Not Currently Partners  Male                Family History     Family History   Problem Relation Age of Onset    Heart Disease Mother     Heart Attack Mother     Glaucoma Sister     Diabetes Sister     Diabetes Brother     Kidney Disease Brother     Blindness Brother     Stroke Maternal Grandmother     Stroke Maternal Grandfather     Colon Cancer Neg Hx     Colon Polyps Neg Hx     Liver Cancer Neg Hx     Liver Disease Neg Hx     Esophageal Cancer Neg Hx     Rectal Cancer Neg Hx     Stomach Cancer Neg Hx        Review of Systems   Review of Systems   Constitutional: Positive for activity change and fatigue. Negative for fever. Eyes: Positive for visual disturbance (blind in right eye ). Respiratory: Positive for shortness of breath. Negative for wheezing. Cardiovascular: Positive for leg swelling. Negative for palpitations. Gastrointestinal: Negative for nausea and vomiting. Genitourinary: Positive for difficulty urinating. Musculoskeletal: Negative for arthralgias. Skin: Negative for color change and pallor. Neurological: Positive for weakness. GFR Non- 9 (A) >60    GFR  10 (L) >59    Calcium 9.4 8.8 - 10.2 mg/dL    Total Protein 6.9 6.6 - 8.7 g/dL    Alb 3.5 3.5 - 5.2 g/dL    Total Bilirubin 0.7 0.2 - 1.2 mg/dL    Alkaline Phosphatase 496 (H) 35 - 104 U/L    ALT 11 5 - 33 U/L    AST 20 5 - 32 U/L   CBC Auto Differential    Collection Time: 09/01/20 11:59 AM   Result Value Ref Range    WBC 5.0 4.8 - 10.8 K/uL    RBC 4.36 4.20 - 5.40 M/uL    Hemoglobin 12.0 12.0 - 16.0 g/dL    Hematocrit 39.4 37.0 - 47.0 %    MCV 90.4 81.0 - 99.0 fL    MCH 27.5 27.0 - 31.0 pg    MCHC 30.5 (L) 33.0 - 37.0 g/dL    RDW 16.7 (H) 11.5 - 14.5 %    Platelets 220 211 - 969 K/uL    MPV 9.6 9.4 - 12.3 fL    Neutrophils % 71.6 (H) 50.0 - 65.0 %    Lymphocytes % 14.7 (L) 20.0 - 40.0 %    Monocytes % 9.1 0.0 - 10.0 %    Eosinophils % 3.8 0.0 - 5.0 %    Basophils % 0.6 0.0 - 1.0 %    Neutrophils Absolute 3.6 1.5 - 7.5 K/uL    Immature Granulocytes # 0.0 K/uL    Lymphocytes Absolute 0.7 (L) 1.1 - 4.5 K/uL    Monocytes Absolute 0.50 0.00 - 0.90 K/uL    Eosinophils Absolute 0.20 0.00 - 0.60 K/uL    Basophils Absolute 0.00 0.00 - 0.20 K/uL   Protime-INR    Collection Time: 09/01/20 11:59 AM   Result Value Ref Range    Protime 14.9 (H) 12.0 - 14.6 sec    INR 1.17 0.88 - 1.18   Ethanol    Collection Time: 09/01/20 12:25 PM   Result Value Ref Range    Ethanol Lvl <10 mg/dL   Ammonia    Collection Time: 09/01/20 12:25 PM   Result Value Ref Range    Ammonia 19 11 - 51 umol/L   Urinalysis Reflex to Culture    Collection Time: 09/01/20  1:05 PM    Specimen: Urine, straight catheter   Result Value Ref Range    Color, UA DK YELLOW Straw/Yellow    Clarity, UA CLOUDY (A) Clear    Glucose, Ur 250 (A) Negative mg/dL    Bilirubin Urine Negative Negative    Ketones, Urine Negative Negative mg/dL    Specific Gravity, UA 1.013 1.005 - 1.030    Blood, Urine SMALL (A) Negative    pH, UA 7.5 5.0 - 8.0    Protein, UA >=1000 Negative mg/dL    Urobilinogen, Urine 0.2 <2.0 E.U./dL Nitrite, Urine Negative Negative    Leukocyte Esterase, Urine Negative Negative   Urine Drug Screen    Collection Time: 09/01/20  1:05 PM   Result Value Ref Range    Amphetamine Screen, Urine Negative Negative <1000 ng/mL    Barbiturate Screen, Ur Negative Negative < 200 ng/mL    Benzodiazepine Screen, Urine Negative Negative <100 ng/mL    Cannabinoid Scrn, Ur Negative Negative <50 ng/mL    Cocaine Metabolite Screen, Urine Negative Negative <300 ng/mL    Opiate Scrn, Ur Negative Negative < 300 ng/mL    Drug Screen Comment: see below    Microscopic Urinalysis    Collection Time: 09/01/20  1:05 PM   Result Value Ref Range    WBC, UA 3-5 (A) 0 - 5 /HPF    RBC, UA 3-5 (A) 0 - 2 /HPF    Epithelial Cells, UA 3-5 /HPF    Bacteria, UA None Seen (A) None Seen /HPF   Blood Gas, Arterial    Collection Time: 09/01/20  1:36 PM   Result Value Ref Range    pH, Arterial 7.420 7.350 - 7.450    pCO2, Arterial 50.0 (H) 35.0 - 45.0 mmHg    pO2, Arterial 67.0 (L) 80.0 - 100.0 mmHg    HCO3, Arterial 32.4 (H) 22.0 - 26.0 mmol/L    Base Excess, Arterial 6.7 (H) -2.0 - 2.0 mmol/L    Hemoglobin, Art, Extended 12.4 12.0 - 16.0 g/dL    O2 Sat, Arterial 86.5 (LL) >92 %    Carboxyhgb, Arterial 7.8 (H) 0.0 - 5.0 %    Methemoglobin, Arterial 1.0 <1.5 %    O2 Content, Arterial 15.1 Not Established mL/dL    O2 Therapy Unknown    Potassium, Whole Blood    Collection Time: 09/01/20  1:36 PM   Result Value Ref Range    Potassium, Whole Blood 4.6         Imaging/Diagnostics Last 24 Hours   Ct Head Wo Contrast    Result Date: 9/1/2020  CT HEAD WO CONTRAST 9/1/2020 11:55 AM History: L status changes, dialysis patient Comparisons: None Technique: Radiation dose equals  mGy cm. AUTOMATED EXPOSURE CONTROL dose reduction technique was implemented CT evaluation of the head without intravenous contrast. 5 mm transaxial images were obtained. 2-D sagittal and coronal reconstruction images were generated. Findings: There is central and cortical atrophy.

## 2020-09-01 NOTE — PROGRESS NOTES
Physician Progress Note      PATIENT:               Js Sharma  CSN #:                  [id number]  :                       1960  ADMIT DATE:       2020 11:08 AM  DISCH DATE:  RESPONDING  PROVIDER #:        Cesilia Caldera MD          QUERY TEXT:    Patient admitted with fluid overload in need of dialysis per H&P. Noted   documentation of acute respiratory failure in with hypoxia and hypercapnia in   H&P. Please indicate one of the following and document in the medical record: The medical record reflects the following:  Risk Factors: Fluid overload, COPD  Clinical Indicators: CXR right pleural effusion, mild volume overload. ABG   PCO2 50 PO2 67. No documentation of respiratory distress. Treatment: Oxygen, Nebulized therapy, dialysis    Acute Respiratory Failure Clinical Indicators per [employer] Training Guide and   Quick Reference Guide:  pO2 < 60 mmHg or SpO2 (pulse oximetry) < 91% breathing room air  pCO2 > 50 and pH < 7.35  P/F ratio (pO2 / FIO2) < 300  pO2 decrease or pCO2 increase by 10 mmHg from baseline (if known)  Supplemental oxygen of 40% or more  Presence of respiratory distress, tachypnea, dyspnea, shortness of breath,   wheezing  Unable to speak in complete sentences  Use of accessory muscles to breathe  Extreme anxiety and feeling of impending doom  Tripod position  Confusion/altered mental status/obtunded  Options provided:  -- Acute Respiratory Failure currently as evidenced by, Please document   evidence. -- Acute Respiratory Failure ruled out after study  -- Other - I will add my own diagnosis  -- Disagree - Not applicable / Not valid  -- Disagree - Clinically unable to determine / Unknown  -- Refer to Clinical Documentation Reviewer    PROVIDER RESPONSE TEXT:    Acute Respiratory Failure has been ruled out after study.     Query created by: Lorenza Ivory on 2020 6:58 PM      Electronically signed by:  Cesilia Caldera MD 2020 7:37 PM

## 2020-09-02 LAB
ANION GAP SERPL CALCULATED.3IONS-SCNC: 16 MMOL/L (ref 7–19)
BASE EXCESS ARTERIAL: 6.3 MMOL/L (ref -2–2)
BUN BLDV-MCNC: 21 MG/DL (ref 8–23)
CALCIUM SERPL-MCNC: 8.9 MG/DL (ref 8.8–10.2)
CARBOXYHEMOGLOBIN ARTERIAL: 3.3 % (ref 0–5)
CHLORIDE BLD-SCNC: 88 MMOL/L (ref 98–111)
CO2: 28 MMOL/L (ref 22–29)
CREAT SERPL-MCNC: 3.7 MG/DL (ref 0.5–0.9)
GFR AFRICAN AMERICAN: 15
GFR NON-AFRICAN AMERICAN: 12
GLUCOSE BLD-MCNC: 122 MG/DL (ref 70–99)
GLUCOSE BLD-MCNC: 126 MG/DL (ref 70–99)
GLUCOSE BLD-MCNC: 204 MG/DL (ref 70–99)
GLUCOSE BLD-MCNC: 235 MG/DL (ref 74–109)
GLUCOSE BLD-MCNC: 239 MG/DL (ref 70–99)
HCO3 ARTERIAL: 31.8 MMOL/L (ref 22–26)
HCT VFR BLD CALC: 36.7 % (ref 37–47)
HEMOGLOBIN, ART, EXTENDED: 10.8 G/DL (ref 12–16)
HEMOGLOBIN: 11 G/DL (ref 12–16)
MCH RBC QN AUTO: 27.5 PG (ref 27–31)
MCHC RBC AUTO-ENTMCNC: 30 G/DL (ref 33–37)
MCV RBC AUTO: 91.8 FL (ref 81–99)
METHEMOGLOBIN ARTERIAL: 0.3 %
O2 CONTENT ARTERIAL: 14.7 ML/DL
O2 SAT, ARTERIAL: 95.6 %
O2 THERAPY: ABNORMAL
PCO2 ARTERIAL: 49 MMHG (ref 35–45)
PDW BLD-RTO: 16.5 % (ref 11.5–14.5)
PERFORMED ON: ABNORMAL
PH ARTERIAL: 7.42 (ref 7.35–7.45)
PLATELET # BLD: 183 K/UL (ref 130–400)
PMV BLD AUTO: 9.8 FL (ref 9.4–12.3)
PO2 ARTERIAL: 104 MMHG (ref 80–100)
POTASSIUM REFLEX MAGNESIUM: 4.2 MMOL/L (ref 3.5–5)
POTASSIUM, WHOLE BLOOD: 4
RBC # BLD: 4 M/UL (ref 4.2–5.4)
SODIUM BLD-SCNC: 132 MMOL/L (ref 136–145)
WBC # BLD: 5.4 K/UL (ref 4.8–10.8)

## 2020-09-02 PROCEDURE — 1210000000 HC MED SURG R&B

## 2020-09-02 PROCEDURE — 6370000000 HC RX 637 (ALT 250 FOR IP): Performed by: HOSPITALIST

## 2020-09-02 PROCEDURE — 85027 COMPLETE CBC AUTOMATED: CPT

## 2020-09-02 PROCEDURE — 6370000000 HC RX 637 (ALT 250 FOR IP): Performed by: FAMILY MEDICINE

## 2020-09-02 PROCEDURE — 2580000003 HC RX 258: Performed by: FAMILY MEDICINE

## 2020-09-02 PROCEDURE — 84132 ASSAY OF SERUM POTASSIUM: CPT

## 2020-09-02 PROCEDURE — 80048 BASIC METABOLIC PNL TOTAL CA: CPT

## 2020-09-02 PROCEDURE — 2700000000 HC OXYGEN THERAPY PER DAY

## 2020-09-02 PROCEDURE — 36600 WITHDRAWAL OF ARTERIAL BLOOD: CPT

## 2020-09-02 PROCEDURE — 82803 BLOOD GASES ANY COMBINATION: CPT

## 2020-09-02 PROCEDURE — 82947 ASSAY GLUCOSE BLOOD QUANT: CPT

## 2020-09-02 PROCEDURE — 94640 AIRWAY INHALATION TREATMENT: CPT

## 2020-09-02 PROCEDURE — 6360000002 HC RX W HCPCS: Performed by: FAMILY MEDICINE

## 2020-09-02 PROCEDURE — 36415 COLL VENOUS BLD VENIPUNCTURE: CPT

## 2020-09-02 RX ADMIN — IPRATROPIUM BROMIDE AND ALBUTEROL SULFATE 1 AMPULE: 2.5; .5 SOLUTION RESPIRATORY (INHALATION) at 10:00

## 2020-09-02 RX ADMIN — INSULIN LISPRO 4 UNITS: 100 INJECTION, SOLUTION INTRAVENOUS; SUBCUTANEOUS at 12:45

## 2020-09-02 RX ADMIN — IPRATROPIUM BROMIDE AND ALBUTEROL SULFATE 1 AMPULE: 2.5; .5 SOLUTION RESPIRATORY (INHALATION) at 18:44

## 2020-09-02 RX ADMIN — IPRATROPIUM BROMIDE AND ALBUTEROL SULFATE 1 AMPULE: 2.5; .5 SOLUTION RESPIRATORY (INHALATION) at 06:16

## 2020-09-02 RX ADMIN — PRAVASTATIN SODIUM 10 MG: 10 TABLET ORAL at 20:25

## 2020-09-02 RX ADMIN — HEPARIN SODIUM 5000 UNITS: 5000 INJECTION INTRAVENOUS; SUBCUTANEOUS at 06:14

## 2020-09-02 RX ADMIN — INSULIN LISPRO 4 UNITS: 100 INJECTION, SOLUTION INTRAVENOUS; SUBCUTANEOUS at 18:27

## 2020-09-02 RX ADMIN — SEVELAMER CARBONATE 800 MG: 800 TABLET, FILM COATED ORAL at 08:13

## 2020-09-02 RX ADMIN — ASPIRIN 81 MG: 81 TABLET, COATED ORAL at 08:12

## 2020-09-02 RX ADMIN — CARVEDILOL 12.5 MG: 12.5 TABLET, FILM COATED ORAL at 08:13

## 2020-09-02 RX ADMIN — CARVEDILOL 12.5 MG: 12.5 TABLET, FILM COATED ORAL at 16:57

## 2020-09-02 RX ADMIN — DICYCLOMINE HYDROCHLORIDE 10 MG: 10 CAPSULE ORAL at 16:57

## 2020-09-02 RX ADMIN — Medication 10 UNITS: at 20:25

## 2020-09-02 RX ADMIN — DICYCLOMINE HYDROCHLORIDE 10 MG: 10 CAPSULE ORAL at 12:50

## 2020-09-02 RX ADMIN — HEPARIN SODIUM 5000 UNITS: 5000 INJECTION INTRAVENOUS; SUBCUTANEOUS at 12:50

## 2020-09-02 RX ADMIN — BRIMONIDINE TARTRATE 1 DROP: 2 SOLUTION OPHTHALMIC at 20:42

## 2020-09-02 RX ADMIN — LATANOPROST 1 DROP: 50 SOLUTION OPHTHALMIC at 20:42

## 2020-09-02 RX ADMIN — LOSARTAN POTASSIUM 50 MG: 50 TABLET, FILM COATED ORAL at 20:25

## 2020-09-02 RX ADMIN — DICYCLOMINE HYDROCHLORIDE 10 MG: 10 CAPSULE ORAL at 08:13

## 2020-09-02 RX ADMIN — HEPARIN SODIUM 5000 UNITS: 5000 INJECTION INTRAVENOUS; SUBCUTANEOUS at 20:31

## 2020-09-02 RX ADMIN — INSULIN LISPRO 4 UNITS: 100 INJECTION, SOLUTION INTRAVENOUS; SUBCUTANEOUS at 08:28

## 2020-09-02 RX ADMIN — INSULIN LISPRO 4 UNITS: 100 INJECTION, SOLUTION INTRAVENOUS; SUBCUTANEOUS at 12:44

## 2020-09-02 RX ADMIN — DICYCLOMINE HYDROCHLORIDE 10 MG: 10 CAPSULE ORAL at 20:25

## 2020-09-02 RX ADMIN — IPRATROPIUM BROMIDE AND ALBUTEROL SULFATE 1 AMPULE: 2.5; .5 SOLUTION RESPIRATORY (INHALATION) at 14:01

## 2020-09-02 RX ADMIN — SODIUM CHLORIDE, PRESERVATIVE FREE 10 ML: 5 INJECTION INTRAVENOUS at 08:12

## 2020-09-02 RX ADMIN — SODIUM CHLORIDE, PRESERVATIVE FREE 10 ML: 5 INJECTION INTRAVENOUS at 20:43

## 2020-09-02 RX ADMIN — LOSARTAN POTASSIUM 50 MG: 50 TABLET, FILM COATED ORAL at 08:13

## 2020-09-02 RX ADMIN — BRIMONIDINE TARTRATE 1 DROP: 2 SOLUTION OPHTHALMIC at 08:21

## 2020-09-02 ASSESSMENT — PAIN SCALES - GENERAL: PAINLEVEL_OUTOF10: 0

## 2020-09-02 NOTE — PROGRESS NOTES
Patient's supplemental oxygen turned down to 1 liter NC. Will monitor.  Electronically signed by Paris Neumann RN on 9/2/2020 at 10:39 AM

## 2020-09-02 NOTE — PROGRESS NOTES
BLOOD GAS, ARTERIAL [[id number]] (Abnormal)  Collected: 09/02/20 0801       Specimen: Blood gases  Updated: 09/02/20 0802        pH, Arterial  7.420        pCO2, Arterial  49.0  mmHg         pO2, Arterial  104.0  mmHg         HCO3, Arterial  31.8  mmol/L         Base Excess, Arterial  6.3  mmol/L         Hemoglobin, Art, Extended  10.8  g/dL         O2 Sat, Arterial  95.6  %         Carboxyhgb, Arterial  3.3  %         Methemoglobin, Arterial  0.3  %         O2 Content, Arterial  14.7  mL/dL         O2 Therapy  Unknown       Potassium, Whole Blood [[id number]]  Collected: 09/02/20 0801        Updated: 09/02/20 0802        Potassium, Whole Blood  4.0      AT +  NC @ 2 LPM  RR

## 2020-09-02 NOTE — CONSULTS
Nephrology ([facility]) Consult Note      Patient:  Brook Chin  YOB: 1960  Date of Service: 9/1/2020  MRN: [id number]   Acct: [de-identified]   Primary Care Physician: RAMONA Elizondo  Advance Directive: Full Code  Admit Date: 9/1/2020       Hospital Day: 0  Referring Provider: Anjelica Ling MD    Patient independently seen and examined, Chart, Consults, Notes, Operative notes, Labs, Cardiology, and Radiology studies reviewed as available. Subjective:  Brook Chin is a 56 y.o. female  whom we were consulted for ESRD. TTS HD pt at [facility]. Admitted for evaluation of altered mental status/confusion/falls. .  Pt initially seen on HD and feeling better. Reported no recent issues with HD. Denied cp/n/v/d/cp.  +soa/fatigue. Dialysis   Pt was seen on RRT  Modality: Hemodialysis  Access: Arterial Venous Fistula  Location: left upper  QB: 450  QD: 700  UF: 1160      Allergies:  Bupropion; Sulfa antibiotics; Varenicline; Wellbutrin [bupropion hcl];  Azithromycin; and Penicillins    Medicines:  Current Facility-Administered Medications   Medication Dose Route Frequency Provider Last Rate Last Dose    [START ON 9/2/2020] aspirin EC tablet 81 mg  81 mg Oral Daily Paulina Jaramillo MD        latanoprost (XALATAN) 0.005 % ophthalmic solution 1 drop  1 drop Both Eyes Nightly Paulina Jaramillo MD        brimonidine (ALPHAGAN) 0.2 % ophthalmic solution 1 drop  1 drop Both Eyes BID Paulina Jaramillo MD        carvedilol (COREG) tablet 12.5 mg  12.5 mg Oral BID  Paulina Jaramillo MD        dicyclomine (BENTYL) capsule 10 mg  10 mg Oral 4x Daily Paulina Jaramillo MD        fluticasone Skyler Holstein) 50 MCG/ACT nasal spray 2 spray  2 spray Nasal Daily Paulina Jaramillo MD        lactulose (CHRONULAC) 10 GM/15ML solution 30 g  30 g Oral TID Paulina Jaramillo MD        losartan (COZAAR) tablet 50 mg  50 mg Oral BID Paulina Jaramillo MD        pravastatin (PRAVACHOL) tablet Subcutaneous Nightly Annalisa Marie MD        ipratropium-albuterol (DUONEB) nebulizer solution 1 ampule  1 ampule Inhalation Q4H WA Annalisa Marie MD        albuterol (PROVENTIL) nebulizer solution 2.5 mg  2.5 mg Nebulization Q6H PRN Annalisa Marie MD        nicotine (NICODERM CQ) 21 MG/24HR 1 patch  1 patch Transdermal Daily Annalisa Marie MD        midodrine (PROAMATINE) tablet 2.5 mg  2.5 mg Oral Once in dialysis Annalisa Marie MD           Past Medical History:  Past Medical History:   Diagnosis Date    Blind right eye     partial vision loss in the L eye too, due to DM    Blindness of one eye     Right     CHF (congestive heart failure) (HCC)     CKD (chronic kidney disease), stage IV (HCC)     secondary to diabetic nephropathy    Diabetes ([facility] [address] [facility]     Diabetes mellitus with ophthalmic manifestations     dx'd in 2000 but likely ongoing before that, was dx'd when she went blind in the R eye    Glaucoma     [facility]     [facility] t[facility], thwilian, [facility]    Hypertension     2000, dx'd at same time as the DM    Obesity     Renal osteodystrophy     Smoker     Type II or unspecified type diabetes mellitus with renal manifestations, uncontrolled(250.42)        Past Surgical History:  Past Surgical History:   Procedure Laterality Date    CARPAL TUNNEL RELEASE      CATARACT REMOVAL     [address], 1978, and 1979    CHOLECYSTECTOMY      COLONOSCOPY N/A 3/9/2018    Dr ReevesLbikvo-Dqkxdmsaezvujp-Gqwrhncskvyih AP (-) dysplasia x 1, tubular AP x  (-) dysplasia x 1, HP x 4--1 yr recall    DIALYSIS FISTULA CREATION Left 4/23/15 [facility]    Left proximal ulnar artery to cephalic vein AVF creation    EYE SURGERY      laser, several times     TYMPANOSTOMY TUBE PLACEMENT Bilateral     VASCULAR SURGERY Left 5/11/15 [facility]    US-guided cannulation left distal upper arm cephalic vein with 4-Latvian glide sheath; SANDYE RAMBO f'grams with venography SVC; left cephalic vein BAM with [id number] julieth balloon; completion venogram E    VASCULAR SURGERY Left 5/28/15 [facility]    Percutaneous cannulation left distal radial artery with 4-Kosovan glide sheath; LUE AV f'grams with venography SVC; left cephalic vein balloon a'plasty with [id number] julieth balloon and [id number] julieth balloon; proximal and mid upper arm cephalic vein BAM with [id number] julieth balloon; completion venogram [facility]    VASCULAR SURGERY Left 6/15/15 [facility]    US-guided cannulation left cephalic vein with 4-Kosovan and later 7-Kosovan sheath; LUE AV f'grams including venography SVC; left cephalic vein stenosis balloon a'plasty with [id number] cutting balloon; completion f'gram and venogram [facility]    VASCULAR SURGERY  05/15/2017    S. Left upper fistulograms/venograms.  VASCULAR SURGERY  02/14/2018    S. Left upper fistulograms, left subclavian BA 12x40 atlas, left cephalic arch BA [id number] conquest.    VASCULAR SURGERY  02/13/2019    S. Left upper fistulograms,BA left SCV 8x40 conquest,stent left SCV, viabahn 13x50,left SCV stent BA 12x40 conquest.       Family History  Family History   Problem Relation Age of Onset    Heart Disease Mother     Heart Attack Mother     Glaucoma Sister     Diabetes Sister     Diabetes Brother     Kidney Disease Brother     Blindness Brother     Stroke Maternal Grandmother     Stroke Maternal Grandfather     Colon Cancer Neg Hx     Colon Polyps Neg Hx     Liver Cancer Neg Hx     Liver Disease Neg Hx     Esophageal Cancer Neg Hx     Rectal Cancer Neg Hx     Stomach Cancer Neg Hx        Social History  Social History     Socioeconomic History    Marital status:      Spouse name: Not on file    Number of children: Not on file    Years of education: Not on file    Highest education level: Not on file   Occupational History    Not on file   Social Needs    Financial resource strain: Not on file    Food insecurity     Worry: Not on file     Inability: Not on file   [facility] Transportation needs     Medical: Not on file     Non-medical: Not on file   Tobacco Use    Smoking status: Current Every Day Smoker     Packs/day: 1.00     Years: 40.00     Pack years: 40.00    Smokeless tobacco: Never Used   Substance and Sexual Activity    Alcohol use: No     Alcohol/week: 0.0 standard drinks    Drug use: Yes     Types: Marijuana    Sexual activity: Not Currently     Partners: Male   Lifestyle    Physical activity     Days per week: Not on file     Minutes per session: Not on file    Stress: Not on file   Relationships    Social connections     Talks on phone: Not on file     Gets together: Not on file     Attends Mu-ism service: Not on file     Active member of club or organization: Not on file     Attends meetings of clubs or organizations: Not on file     Relationship status: Not on file    Intimate partner violence     Fear of current or ex partner: Not on file     Emotionally abused: Not on file     Physically abused: Not on file     Forced sexual activity: Not on file   Other Topics Concern    Not on file   Social History Narrative    Not on file         Review of Systems:  History obtained from chart review and the patient  General ROS: No fever or chills  Respiratory ROS: No cough, +shortness of breath  Cardiovascular ROS: No chest pain or palpitations  Gastrointestinal ROS: No abdominal pain or melena  Genito-Urinary ROS: No dysuria or hematuria  Musculoskeletal ROS: No joint pain or swelling   14 point ROS reviewed with the patient and negative except as noted above and in the HPI unless unable to obtain.     Objective:  Patient Vitals for the past 24 hrs:   BP Temp Temp src Pulse Resp SpO2 Weight   09/01/20 2030 131/69 97.5 °F (36.4 °C) Temporal 74 22 -- 153 lb 7 oz (69.6 kg)   09/01/20 1653 (!) 153/76 96.9 °F (36.1 °C) -- 65 16 97 % --   09/01/20 1600 (!) 144/70 -- -- 65 17 100 % --   09/01/20 1430 139/69 -- -- 65 18 92 % --   09/01/20 1400 (!) 144/69 -- -- 62 14 92 % -- 09/01/20 1337 -- -- -- -- 15 94 % --   09/01/20 1332 134/68 -- -- 65 15 92 % --   09/01/20 1326 (!) 158/72 -- -- 64 15 92 % --   09/01/20 1230 137/67 -- -- 66 16 94 % --   09/01/20 1201 (!) 143/62 -- -- 67 20 93 % --   09/01/20 1132 (!) 146/68 -- -- 67 14 95 % --   09/01/20 1113 (!) 151/71 98.4 °F (36.9 °C) Temporal 69 20 93 % 160 lb (72.6 kg)       Intake/Output Summary (Last 24 hours) at 9/1/2020 2051  Last data filed at 9/1/2020 2030  Gross per 24 hour   Intake --   Output 3015 ml   Net -3015 ml     General: awake/alert   Chest:  clear to auscultation bilaterally  CVS: regular rate and rhythm  Abdominal: soft, nontender, normal bowel sounds  Extremities: no cyanosis or edema  Skin: warm and dry without rash      Labs:  BMP:   Recent Labs     09/01/20  1159 09/01/20  1336   *  --    K 4.6 4.6   CL 84*  --    CO2 30*  --    BUN 36*  --    CREATININE 5.1*  --    CALCIUM 9.4  --      CBC:   Recent Labs     09/01/20  1159   WBC 5.0   HGB 12.0   HCT 39.4   MCV 90.4        LIVER PROFILE:   Recent Labs     09/01/20  1159   AST 20   ALT 11   BILITOT 0.7   ALKPHOS 496*     PT/INR:   Recent Labs     09/01/20  1159   PROTIME 14.9*   INR 1.17     APTT: No results for input(s): APTT in the last 72 hours. BNP:  No results for input(s): BNP in the last 72 hours. Ionized Calcium:No results for input(s): IONCA in the last 72 hours. Magnesium:No results for input(s): MG in the last 72 hours. Phosphorus:No results for input(s): PHOS in the last 72 hours. HgbA1C: No results for input(s): LABA1C in the last 72 hours. Hepatic:   Recent Labs     09/01/20  1159   ALKPHOS 496*   ALT 11   AST 20   PROT 6.9   BILITOT 0.7   LABALBU 3.5     Lactic Acid: No results for input(s): LACTA in the last 72 hours. Troponin:   Recent Labs     09/01/20  1746   CKTOTAL 50     ABGs: No results for input(s): PH, PCO2, PO2, HCO3, O2SAT in the last 72 hours. CRP:  No results for input(s): CRP in the last 72 hours.   Sed Rate:  No results for input(s): SEDRATE in the last 72 hours. Cultures:   No results for input(s): CULTURE in the last 72 hours. No results for input(s): BC, Raymundo Locus in the last 72 hours. No results for input(s): CXSURG in the last 72 hours. Radiology reports as per the Radiologist  Radiology: Ct Head Wo Contrast    Result Date: 9/1/2020  CT HEAD WO CONTRAST 9/1/2020 11:55 AM History: L status changes, dialysis patient Comparisons: None Technique: Radiation dose equals  mGy cm. AUTOMATED EXPOSURE CONTROL dose reduction technique was implemented CT evaluation of the head without intravenous contrast. 5 mm transaxial images were obtained. 2-D sagittal and coronal reconstruction images were generated. Findings: There is central and cortical atrophy. There is no intra or extra-axial hemorrhage. There is no mass effect or midline shift. There is no hydrocephalus. Paranasal sinuses imaged in part are clear. Bone window imaging demonstrates no skull fracture acute calvarial abnormality. Postsurgical changes associated with the left globe noted. Impression: 1. No CT evidence of an acute intracranial process. Signed by Dr Berenice Strickland on 9/1/2020 12:58 PM    Xr Chest Portable    Result Date: 9/1/2020  XR CHEST PORTABLE 9/1/2020 11:34 AM HISTORY:   Hemodialysis patient. Confusion and fatigue. Shortness of breath  Single view. COMPARISONS:  7/11/2020, 6/8/2020 and 5/16/2020 chest radiography FINDINGS: There is right pleural effusion with associated airspace opacities in the right lung base likely atelectasis. The heart is generous. The central bronchovascular interstitial markings are prominent, mild volume overload suspected. 1. Right pleural effusion with probable right lower lobe atelectasis. Generous heart size with central bronchovascular interstitial prominence, volume overload considered.  Signed by Dr Berenice Strickland on 9/1/2020 12:56 PM       Assessment   ESRD  DM2 with diabetic nephropathy  Secondary Hyperparathyroidism  HTN  Right pleural effusion  Anemia CKD  Diastolic dysfunction  COPD with tobacco abuse    Plan:  HD MWF  UF as tolerated  Fluid/diet education  Monitor labs      Thank you for the consult, we appreciate the opportunity to provide care to your patients. Feel free to contact me if I can be of any further assistance.       Brook Trevino MD  09/01/20  8:51 PM

## 2020-09-02 NOTE — PROGRESS NOTES
Nephrology ([facility]) Consult Note      Patient:  Rodney Rendon  YOB: 1960  Date of Service: 9/2/2020  MRN: [id number]   Acct: [de-identified]   Primary Care Physician: RAMONA Kaiser  Advance Directive: Full Code  Admit Date: 9/1/2020       Hospital Day: 1  Referring Provider: Aureliano Bai MD    Patient independently seen and examined, Chart, Consults, Notes, Operative notes, Labs, Cardiology, and Radiology studies reviewed as available. Subjective:  Rodney Rendon is a 56 y.o. female  whom we were consulted for ESRD. TTS HD pt at [facility]. Admitted for evaluation of altered mental status/confusion/falls. .  Pt initially seen on HD and feeling better. Reported no recent issues with HD. Denied cp/n/v/d/cp.  +soa/fatigue. Today, no new events. Breathing better. Receiving nebulizer treatments. Denies chest pain, shortness of air, nausea vomiting. Allergies:  Bupropion; Sulfa antibiotics; Varenicline; Wellbutrin [bupropion hcl];  Azithromycin; and Penicillins    Medicines:  Current Facility-Administered Medications   Medication Dose Route Frequency Provider Last Rate Last Dose    insulin lispro (HUMALOG) injection vial 4 Units  4 Units Subcutaneous 4x Daily AC & HS Aureliano Bai MD        aspirin EC tablet 81 mg  81 mg Oral Daily Arie Joseph MD   81 mg at 09/02/20 0812    latanoprost (XALATAN) 0.005 % ophthalmic solution 1 drop  1 drop Both Eyes Nightly Arie Joseph MD   1 drop at 09/01/20 2206    brimonidine (ALPHAGAN) 0.2 % ophthalmic solution 1 drop  1 drop Both Eyes BID Arie Joseph MD   1 drop at 09/02/20 0821    carvedilol (COREG) tablet 12.5 mg  12.5 mg Oral BID WC Arie Joseph MD   12.5 mg at 09/02/20 0813    dicyclomine (BENTYL) capsule 10 mg  10 mg Oral 4x Daily Arie Joseph MD   10 mg at 09/02/20 0813    fluticasone (FLONASE) 50 MCG/ACT nasal spray 2 spray  2 spray Nasal Daily Arie Joseph MD   2 spray CREATION Left 4/23/15 [facility]    Left proximal ulnar artery to cephalic vein AVF creation    EYE SURGERY      laser, several times     TYMPANOSTOMY TUBE PLACEMENT Bilateral     VASCULAR SURGERY Left 5/11/15 [facility]    US-guided cannulation left distal upper arm cephalic vein with 4-Nicaraguan glide sheath; LUE AV f'grams with venography SVC; left cephalic vein BAM with [id number] julieth balloon; completion venogram E    VASCULAR SURGERY Left 5/28/15 [facility]    Percutaneous cannulation left distal radial artery with 4-[place]aguan glide sheath; LUE AV f'grams with venography SVC; left cephalic vein balloon a'plasty with [id number] julieth balloon and [id number] julieth balloon; proximal and mid upper arm cephalic vein BAM with [id number] julieth balloon; [place] [facility]    VASCULAR SURGERY Left 6/15/15 [facility]    US-guided cannulation left cephalic vein with 4-[place]uan and later 7-[place]aguan sheath; LUE AV f'grams including venography SVC; left cephalic vein stenosis balloon a'plasty with [id number] cutting balloon; completion f'gram and [place]ogram [facility]    VASCULAR SURGERY  05/15/2017    [facility]. Left upper fistulograms/venograms.  VASCULAR SURGERY  02/14/2018    [facility]. Left upper fistulograms, left subclavian BA 12x40 atlas, left cephalic arch BA [id number] conquest.    VASCULAR SURGERY  02/13/2019    [facility]. Left upper fistulograms,BA left SCV 8x40 conquest,stent left SCV, viabahn 13x50,left SCV stent BA 12x40 conquest.       Family History  Family History   Problem Relation Age of Onset    Heart Disease Mother     Heart Attack Mother     Glaucoma Sister     Diabetes Sister     Diabetes Brother     Kidney Disease Brother     Blindness Brother     Stroke Maternal Grandmother     Stroke Maternal Grandfather     Colon Cancer Neg Hx     Colon Polyps Neg Hx     Liver Cancer Neg Hx     Liver Disease Neg Hx     Esophageal Cancer Neg Hx     Rectal Cancer Neg Hx     Stomach Cancer Neg Hx        Social History  Social History     Socioeconomic History    Marital status:      Spouse name: Not on file    Number of children: Not on file    Years of education: Not on file    Highest education level: Not on file   Occupational History    Not on file   Social Needs    Financial resource strain: Not on file    Food insecurity     Worry: Not on file     Inability: Not on file    Transportation needs     Medical: Not on file     Non-medical: Not on file   Tobacco Use    Smoking status: Current Every Day Smoker     Packs/day: 1.00     Years: 40.00     Pack years: 40.00    Smokeless tobacco: Never Used   Substance and Sexual Activity    Alcohol use: No     Alcohol/week: 0.0 standard drinks    Drug use: Yes     Types: Marijuana    Sexual activity: Not Currently     Partners: Male   Lifestyle    Physical activity     Days per week: Not on file     Minutes per session: Not on file    Stress: Not on file   Relationships    Social connections     Talks on phone: Not on file     Gets together: Not on file     Attends Mor[employer] service: Not on file     Active member of club or organization: Not on file     Attends meetings of clubs or organizations: Not on file     Relationship status: Not on file    Intimate partner violence     Fear of current or ex partner: Not on file     Emotionally abused: Not on file     Physically abused: Not on file     Forced sexual activity: Not on file   Other Topics Concern    Not on file   Social History Narrative    Not on file         Review of Systems:  History obtained from chart review and the patient  General ROS: No fever or chills  Respiratory ROS: No cough, shortness of breath  Cardiovascular ROS: No chest pain or palpitations  Gastrointestinal ROS: No abdominal pain or melena  Genito-Urinary ROS: No dysuria or hematuria  Musculoskeletal ROS: No joint pain or swelling   14 point ROS reviewed with the patient and negative except as noted above and in the HPI unless unable to obtain.     Objective:  Patient Vitals for the past 24 hrs:   BP Temp Temp src Pulse Resp SpO2 Weight   09/02/20 1100 -- -- -- -- -- 99 % --   09/02/20 1002 -- -- -- -- -- 98 % --   09/02/20 0618 (!) 150/68 97.4 °F (36.3 °C) Temporal 72 16 99 % --   09/02/20 0032 (!) 151/72 97.5 °F (36.4 °C) Temporal 75 16 95 % --   09/01/20 2200 (!) 154/74 96.7 °F (35.9 °C) Temporal 78 18 95 % --   09/01/20 2030 131/69 97.5 °F (36.4 °C) Temporal 74 22 -- 153 lb 7 oz (69.6 kg)   09/01/20 1653 (!) 153/76 96.9 °F (36.1 °C) -- 65 16 97 % --   09/01/20 1600 (!) 144/70 -- -- 65 17 100 % --   09/01/20 1430 139/69 -- -- 65 18 92 % --   09/01/20 1400 (!) 144/69 -- -- 62 14 92 % --   09/01/20 1337 -- -- -- -- 15 94 % --   09/01/20 1332 134/68 -- -- 65 15 92 % --   09/01/20 1326 (!) 158/72 -- -- 64 15 92 % --   09/01/20 1230 137/67 -- -- 66 16 94 % --       Intake/Output Summary (Last 24 hours) at 9/2/2020 1204  Last data filed at 9/2/2020 8403  Gross per 24 hour   Intake 310 ml   Output 3015 ml   Net -2705 ml     General: awake/alert   Chest:  clear to auscultation bilaterally  CVS: regular rate and rhythm  Abdominal: soft, nontender, normal bowel sounds  Extremities: no cyanosis or edema  Skin: warm and dry without rash      Labs:  BMP:   Recent Labs     09/01/20  1159 09/01/20  1336 09/02/20  0205 09/02/20  0801   *  --  132*  --    K 4.6 4.6 4.2 4.0   CL 84*  --  88*  --    CO2 30*  --  28  --    BUN 36*  --  21  --    CREATININE 5.1*  --  3.7*  --    CALCIUM 9.4  --  8.9  --      CBC:   Recent Labs     09/01/20  1159 09/02/20  0205   WBC 5.0 5.4   HGB 12.0 11.0*   HCT 39.4 36.7*   MCV 90.4 91.8    183     LIVER PROFILE:   Recent Labs     09/01/20  1159   AST 20   ALT 11   BILITOT 0.7   ALKPHOS 496*     PT/INR:   Recent Labs     09/01/20  1159   PROTIME 14.9*   INR 1.17     APTT: No results for input(s): APTT in the last 72 hours. BNP:  No results for input(s): BNP in the last 72 hours.   Ionized Calcium:No results for input(s): IONCA in the last 72 hours.  Magnesium:No results for input(s): MG in the last 72 hours. Phosphorus:No results for input(s): PHOS in the last 72 hours. HgbA1C: No results for input(s): LABA1C in the last 72 hours. Hepatic:   Recent Labs     09/01/20  1159   ALKPHOS 496*   ALT 11   AST 20   PROT 6.9   BILITOT 0.7   LABALBU 3.5     Lactic Acid: No results for input(s): LACTA in the last 72 hours. Troponin:   Recent Labs     09/01/20  1746   CKTOTAL 50     ABGs: No results for input(s): PH, PCO2, PO2, HCO3, O2SAT in the last 72 hours. CRP:  No results for input(s): CRP in the last 72 hours. Sed Rate:  No results for input(s): SEDRATE in the last 72 hours. Cultures:   No results for input(s): CULTURE in the last 72 hours. No results for input(s): BC, Lorenda Ricks in the last 72 hours. No results for input(s): CXSURG in the last 72 hours. Radiology reports as per the Radiologist  Radiology: Ct Head Wo Contrast    Result Date: 9/1/2020  CT HEAD WO CONTRAST 9/1/2020 11:55 AM History: L status changes, dialysis patient Comparisons: None Technique: Radiation dose equals  mGy cm. AUTOMATED EXPOSURE CONTROL dose reduction technique was implemented CT evaluation of the head without intravenous contrast. 5 mm transaxial images were obtained. 2-D sagittal and coronal reconstruction images were generated. Findings: There is central and cortical atrophy. There is no intra or extra-axial hemorrhage. There is no mass effect or midline shift. There is no hydrocephalus. Paranasal sinuses imaged in part are clear. Bone window imaging demonstrates no skull fracture acute calvarial abnormality. Postsurgical changes associated with the left globe noted. Impression: 1. No CT evidence of an acute intracranial process. Signed by Dr Ed Knight on 9/1/2020 12:58 PM    Xr Chest Portable    Result Date: 9/1/2020  XR CHEST PORTABLE 9/1/2020 11:34 AM HISTORY:   Hemodialysis patient. Confusion and fatigue.  Shortness of

## 2020-09-02 NOTE — PROGRESS NOTES
Progress Note    Date:9/2/2020       Room:0307/307-01  Patient Suzette Rosen     YOB: 1960     Age:60 y.o. Subjective   Interval History Status: improved. 58-year-old  female with a known past medical history of end-stage renal disease dialysis Tuesday Thursday Saturday, history of diastolic congestive heart failure, blindness in the right eye, type 2 diabetes, hypertension, tobacco abuse, hyperlipidemia who presented to emergency room complaints of worsening confusion. Work-up unremarkable U tox, ammonia alcohol level within normal limits. Urinalysis with negative nitrites and leukocyte esterase. Patient was found to be hypoxic on room air as well as hypercarbic by ABG, head CT no acute intracranial process, evidence of right-sided pleural effusion with right lower lobe atelectasis on checks x-ray imaging. Was noted to have started Neurontin outpatient for neuropathy, which has been held in-house. Patient's condition not back to baseline, seen by nephrology in-house for maintenance run of dialysis. Patient was seen this morning her room, denied any acute overnight event, denied any chest pain, shortness of breath, nausea vomit abdominal pain, says her mentation has improved a lot compared to when she came in. Review of Systems   Review of Systems  12 point system reviewed and negative except listed above.       Medications   Scheduled Meds:    insulin lispro  4 Units Subcutaneous 4x Daily AC & HS    aspirin EC  81 mg Oral Daily    latanoprost  1 drop Both Eyes Nightly    brimonidine  1 drop Both Eyes BID    carvedilol  12.5 mg Oral BID WC    dicyclomine  10 mg Oral 4x Daily    fluticasone  2 spray Nasal Daily    lactulose  30 g Oral TID    losartan  50 mg Oral BID    pravastatin  10 mg Oral Nightly    sevelamer  800 mg Oral Daily    sodium chloride flush  10 mL Intravenous 2 times per day    heparin (porcine)  5,000 Units Subcutaneous 3 times per day  insulin glargine  10 Units Subcutaneous Nightly    insulin lispro  0-12 Units Subcutaneous TID WC    insulin lispro  0-6 Units Subcutaneous Nightly    ipratropium-albuterol  1 ampule Inhalation Q4H WA    nicotine  1 patch Transdermal Daily     Continuous Infusions:    dextrose       PRN Meds: sodium chloride flush, potassium chloride **OR** potassium alternative oral replacement **OR** potassium chloride, magnesium sulfate, acetaminophen **OR** acetaminophen, polyethylene glycol, promethazine **OR** ondansetron, glucose, dextrose, glucagon (rDNA), dextrose, albuterol    Past History    Past Medical History:   has a past medical history of Blind right eye, Blindness of one eye, CHF (congestive heart failure) ([facility] [address] CKD (chronic kidney disease), stage IV ([facility] [address] Diabetes ([facility] [address] Diabetes mellitus with ophthalmic manifestations, Glaucoma, Hemodialysis patient ([facility] [address] Hypertension, Obesity, Renal osteodystrophy, Smoker, and Type II or unspecified type diabetes mellitus with renal manifestations, uncontrolled(250.42). Social History:   reports that she has been smoking. She has a 40.00 pack-year smoking history. She has never used smokeless tobacco. She reports current drug use. Drug: Marijuana. She reports that she does not drink alcohol.      Family History:   Family History   Problem Relation Age of Onset    Heart Disease Mother     Heart Attack Mother     Glaucoma Sister     Diabetes Sister     Diabetes Brother     Kidney Disease Brother     Blindness Brother     Stroke Maternal Grandmother     Stroke Maternal Grandfather     Colon Cancer Neg Hx     Colon Polyps Neg Hx     Liver Cancer Neg Hx     Liver Disease Neg Hx     Esophageal Cancer Neg Hx     Rectal Cancer Neg Hx     Stomach Cancer Neg Hx        Physical Examination      Vitals:  BP (!) 150/68   Pulse 72   Temp 97.4 °F (36.3 °C) (Temporal)   Resp 16   Wt 153 lb 7 oz (69.6 kg)   SpO2 99%   BMI 30.99 kg/m²   Temp (24hrs), Av.2 °F (36.2 °C), Min:96.7 °F (35.9 °C), Max:97.5 °F (36.4 °C)      I/O (24Hr): Intake/Output Summary (Last 24 hours) at 9/2/2020 1329  Last data filed at 9/2/2020 0937  Gross per 24 hour   Intake 310 ml   Output 3000 ml   Net -2690 ml       Physical Exam  General: No acute distress lying comfortably in bed. HEENT: Atraumatic normocephalic  Cardiac: Normal S1-S2 no murmurs rub or gallop. Respiratory: clear To auscultation bilaterally, no rhonchi or rales  Abdomen: nontender to palpation, no organomegaly noted. Extremities: no tenderness, no edema, moves all extremities  Psych: Affect normal and good eye contact      Labs/Imaging/Diagnostics   Labs:  CBC:  Recent Labs     09/01/20  1159 09/02/20  0205   WBC 5.0 5.4   RBC 4.36 4.00*   HGB 12.0 11.0*   HCT 39.4 36.7*   MCV 90.4 91.8   RDW 16.7* 16.5*    183     CHEMISTRIES:  Recent Labs     09/01/20  1159 09/01/20  1336 09/02/20  0205 09/02/20  0801   *  --  132*  --    K 4.6 4.6 4.2 4.0   CL 84*  --  88*  --    CO2 30*  --  28  --    BUN 36*  --  21  --    CREATININE 5.1*  --  3.7*  --    GLUCOSE 311*  --  235*  --      PT/INR:  Recent Labs     09/01/20  1159   PROTIME 14.9*   INR 1.17     APTT:No results for input(s): APTT in the last 72 hours. LIVER PROFILE:  Recent Labs     09/01/20  1159   AST 20   ALT 11   BILITOT 0.7   ALKPHOS 496*       Imaging Last 24 Hours:  Ct Head Wo Contrast    Result Date: 9/1/2020  CT HEAD WO CONTRAST 9/1/2020 11:55 AM History: L status changes, dialysis patient Comparisons: None Technique: Radiation dose equals  mGy cm. AUTOMATED EXPOSURE CONTROL dose reduction technique was implemented CT evaluation of the head without intravenous contrast. 5 mm transaxial images were obtained. 2-D sagittal and coronal reconstruction images were generated. Findings: There is central and cortical atrophy. There is no intra or extra-axial hemorrhage. There is no mass effect or midline shift. There is no hydrocephalus.  Paranasal sinuses imaged in part are clear. Bone window imaging demonstrates no skull fracture acute calvarial abnormality. Postsurgical changes associated with the left globe noted. Impression: 1. No CT evidence of an acute intracranial process. Signed by Dr Dirk Osorio on 9/1/2020 12:58 PM    Xr Chest Portable    Result Date: 9/1/2020  XR CHEST PORTABLE 9/1/2020 11:34 AM HISTORY:   Hemodialysis patient. Confusion and fatigue. Shortness of breath  Single view. COMPARISONS:  7/11/2020, 6/8/2020 and 5/16/2020 chest radiography FINDINGS: There is right pleural effusion with associated airspace opacities in the right lung base likely atelectasis. The heart is generous. The central bronchovascular interstitial markings are prominent, mild volume overload suspected. 1. Right pleural effusion with probable right lower lobe atelectasis. Generous heart size with central bronchovascular interstitial prominence, volume overload considered.  Signed by Dr Dirk Osorio on 9/1/2020 12:56 PM        Assessment        Hospital Problems           Last Modified POA    Acute respiratory failure with hypoxia and hypercarbia ([address] 9/1/2020 Yes          Plan:          Acute respiratory failure with hypoxia and hypercarbia/right-sided pleural effusion/end-stage renal disease maintenance dialysis Tuesday Thursday Saturday/metabolic encephalopathy  Likely secondary to medication (newly started neurontin)  -Evidenced by chest x-ray, ABG on admission  -Nephrology following for dialysis  -Monitor patient's I/os  -Maintain O2 sats greater than 92%  -Urine drug screen unremarkable, urinalysis negative for nitrite/leukocyte esterase, ammonia within normal limits  -Daily metabolic profile, continue with Renvela  -PT/OT     Chronic grade 2 diastolic dysfunction with preserved ejection fraction  -Evidenced by echocardiogram completed 5/7/2020  -Monitor patient's I/os, low-sodium diet  -Consistent BNP greater than 35,000 due to ESRD     Deconditioned state  -Status post mechanical fall, prior ambulation with walker has since been wheelchair-bound  -PT/OT     Type 2 diabetes insulin-dependent-chronic condition, most recent HbA1c 6.8, continue with Lantus dosing, vitals, con, Accu-Cheks q. before meals at bedtime, hypoglycemic protocol, C carb diet     Chronic Benign Essential Hypertension   - Stable   - Continue current medications   - PRN medications ordered      Dyslipidemia   - Continue Statin therapy     Chronic obstructive pulmonary disease  -Continue with nebulized therapy, maintain O2 sats greater than 92%  -Does not appear to be in acute exacerbation more likely fluid overload state in need of dialysis          Code: Full  DVT prophylaxis: Heparin  Diet cardiac/renal/diabetes  Disposition: Home tomorrow after dialysis.       Electronically signed by Bethanie Burks MD on 9/2/20 at 1:29 PM CDT

## 2020-09-02 NOTE — PLAN OF CARE
Problem: Falls - Risk of:  Goal: Will remain free from falls  Description: Will remain free from falls  Outcome: Ongoing     Problem: Fluid Volume:  Goal: Ability to maintain a balanced intake and output will improve  Description: Ability to maintain a balanced intake and output will improve  Outcome: Ongoing     Problem: Metabolic:  Goal: Ability to maintain appropriate glucose levels will improve  Description: Ability to maintain appropriate glucose levels will improve  Outcome: Ongoing     Problem: Skin Integrity:  Goal: Risk for impaired skin integrity will decrease  Description: Risk for impaired skin integrity will decrease  Outcome: Ongoing     Problem: Tissue Perfusion:  Goal: Adequacy of tissue perfusion will improve  Description: Adequacy of tissue perfusion will improve  Outcome: Ongoing     Problem: Fluid Volume - Imbalance:  Goal: Absence of imbalanced fluid volume signs and symptoms  Description: Absence of imbalanced fluid volume signs and symptoms  Outcome: Ongoing

## 2020-09-02 NOTE — PROGRESS NOTES
Checked patient's O2 sat, 98 percent on 1 liter NC. Turned off supplemental oxygen.  Electronically signed by Celestine Chase RN on 9/2/2020 at 11:13 AM

## 2020-09-03 VITALS
DIASTOLIC BLOOD PRESSURE: 63 MMHG | HEIGHT: 58 IN | HEART RATE: 83 BPM | OXYGEN SATURATION: 94 % | RESPIRATION RATE: 18 BRPM | SYSTOLIC BLOOD PRESSURE: 152 MMHG | TEMPERATURE: 97.2 F | WEIGHT: 153.44 LBS | BODY MASS INDEX: 32.21 KG/M2

## 2020-09-03 LAB
ANION GAP SERPL CALCULATED.3IONS-SCNC: 12 MMOL/L (ref 7–19)
BUN BLDV-MCNC: 35 MG/DL (ref 8–23)
CALCIUM SERPL-MCNC: 9.3 MG/DL (ref 8.8–10.2)
CHLORIDE BLD-SCNC: 86 MMOL/L (ref 98–111)
CO2: 32 MMOL/L (ref 22–29)
CREAT SERPL-MCNC: 4.5 MG/DL (ref 0.5–0.9)
GFR AFRICAN AMERICAN: 12
GFR NON-AFRICAN AMERICAN: 10
GLUCOSE BLD-MCNC: 134 MG/DL (ref 70–99)
GLUCOSE BLD-MCNC: 82 MG/DL (ref 74–109)
GLUCOSE BLD-MCNC: 85 MG/DL (ref 70–99)
HCT VFR BLD CALC: 37.6 % (ref 37–47)
HEMOGLOBIN: 11.5 G/DL (ref 12–16)
MCH RBC QN AUTO: 27.8 PG (ref 27–31)
MCHC RBC AUTO-ENTMCNC: 30.6 G/DL (ref 33–37)
MCV RBC AUTO: 91 FL (ref 81–99)
PDW BLD-RTO: 16.5 % (ref 11.5–14.5)
PERFORMED ON: ABNORMAL
PERFORMED ON: NORMAL
PLATELET # BLD: 178 K/UL (ref 130–400)
PMV BLD AUTO: 10.3 FL (ref 9.4–12.3)
POTASSIUM REFLEX MAGNESIUM: 4.4 MMOL/L (ref 3.5–5)
RBC # BLD: 4.13 M/UL (ref 4.2–5.4)
SODIUM BLD-SCNC: 130 MMOL/L (ref 136–145)
WBC # BLD: 5 K/UL (ref 4.8–10.8)

## 2020-09-03 PROCEDURE — 94640 AIRWAY INHALATION TREATMENT: CPT

## 2020-09-03 PROCEDURE — 6370000000 HC RX 637 (ALT 250 FOR IP): Performed by: FAMILY MEDICINE

## 2020-09-03 PROCEDURE — 36415 COLL VENOUS BLD VENIPUNCTURE: CPT

## 2020-09-03 PROCEDURE — 6360000002 HC RX W HCPCS: Performed by: FAMILY MEDICINE

## 2020-09-03 PROCEDURE — 82947 ASSAY GLUCOSE BLOOD QUANT: CPT

## 2020-09-03 PROCEDURE — 8010000000 HC HEMODIALYSIS ACUTE INPT

## 2020-09-03 PROCEDURE — 80048 BASIC METABOLIC PNL TOTAL CA: CPT

## 2020-09-03 PROCEDURE — 2580000003 HC RX 258: Performed by: FAMILY MEDICINE

## 2020-09-03 PROCEDURE — 85027 COMPLETE CBC AUTOMATED: CPT

## 2020-09-03 PROCEDURE — 5A1D70Z PERFORMANCE OF URINARY FILTRATION, INTERMITTENT, LESS THAN 6 HOURS PER DAY: ICD-10-PCS | Performed by: HOSPITALIST

## 2020-09-03 RX ORDER — GABAPENTIN 100 MG/1
100 CAPSULE ORAL NIGHTLY
Qty: 60 CAPSULE | Refills: 1
Start: 2020-09-03 | End: 2020-10-10 | Stop reason: ALTCHOICE

## 2020-09-03 RX ADMIN — LOSARTAN POTASSIUM 50 MG: 50 TABLET, FILM COATED ORAL at 12:43

## 2020-09-03 RX ADMIN — FLUTICASONE PROPIONATE 2 SPRAY: 50 SPRAY, METERED NASAL at 12:48

## 2020-09-03 RX ADMIN — SEVELAMER CARBONATE 800 MG: 800 TABLET, FILM COATED ORAL at 12:43

## 2020-09-03 RX ADMIN — HEPARIN SODIUM 5000 UNITS: 5000 INJECTION INTRAVENOUS; SUBCUTANEOUS at 07:34

## 2020-09-03 RX ADMIN — IPRATROPIUM BROMIDE AND ALBUTEROL SULFATE 1 AMPULE: 2.5; .5 SOLUTION RESPIRATORY (INHALATION) at 07:01

## 2020-09-03 RX ADMIN — SODIUM CHLORIDE, PRESERVATIVE FREE 10 ML: 5 INJECTION INTRAVENOUS at 12:44

## 2020-09-03 RX ADMIN — IPRATROPIUM BROMIDE AND ALBUTEROL SULFATE 1 AMPULE: 2.5; .5 SOLUTION RESPIRATORY (INHALATION) at 10:44

## 2020-09-03 RX ADMIN — DICYCLOMINE HYDROCHLORIDE 10 MG: 10 CAPSULE ORAL at 12:44

## 2020-09-03 RX ADMIN — ACETAMINOPHEN 650 MG: 325 TABLET, FILM COATED ORAL at 09:58

## 2020-09-03 RX ADMIN — ASPIRIN 81 MG: 81 TABLET, COATED ORAL at 12:44

## 2020-09-03 RX ADMIN — BRIMONIDINE TARTRATE 1 DROP: 2 SOLUTION OPHTHALMIC at 12:49

## 2020-09-03 ASSESSMENT — PAIN SCALES - GENERAL
PAINLEVEL_OUTOF10: 3
PAINLEVEL_OUTOF10: 2

## 2020-09-03 NOTE — PROGRESS NOTES
Nephrology ([facility]) Consult Note      Patient:  Rachell Myrick  YOB: 1960  Date of Service: 9/3/2020  MRN: [id number]   Acct: [de-identified]   Primary Care Physician: RAMONA Pal  Advance Directive: Full Code  Admit Date: 9/1/2020       Hospital Day: 2  Referring Provider: Bethanie Burks MD    Patient independently seen and examined, Chart, Consults, Notes, Operative notes, Labs, Cardiology, and Radiology studies reviewed as available. Subjective:  Rachell Myrick is a 56 y.o. female  whom we were consulted for ESRD. TTS HD pt at [facility]. Admitted for evaluation of altered mental status/confusion/falls. .  Pt initially seen on HD and feeling better. Reported no recent issues with HD. Denied cp/n/v/d/cp.  +soa/fatigue. Today, no new events. Breathing better. Some pain awaiting pain meds. Denies chest pain, shortness of air, nausea vomiting. Dialysis   Pt was seen on RRT  Modality: Hemodialysis  Access: Arterial Venous Fistula  Location: left upper  QB: 450  QD: 700  UF: 860      Allergies:  Bupropion; Sulfa antibiotics; Varenicline; Wellbutrin [bupropion hcl];  Azithromycin; and Penicillins    Medicines:  Current Facility-Administered Medications   Medication Dose Route Frequency Provider Last Rate Last Dose    insulin lispro (HUMALOG) injection vial 4 Units  4 Units Subcutaneous 4x Daily AC & HS Bethanie Burks MD   4 Units at 09/02/20 1827    aspirin EC tablet 81 mg  81 mg Oral Daily Lester Le MD   81 mg at 09/03/20 1244    latanoprost (XALATAN) 0.005 % ophthalmic solution 1 drop  1 drop Both Eyes Nightly Lester Le MD   1 drop at 09/02/20 2042    brimonidine (ALPHAGAN) 0.2 % ophthalmic solution 1 drop  1 drop Both Eyes BID Lester Le MD   1 drop at 09/03/20 1249    carvedilol (COREG) tablet 12.5 mg  12.5 mg Oral BID WC Lester Le MD   12.5 mg at 09/02/20 1657    dicyclomine (BENTYL) capsule 10 mg  10 mg Oral 4x Daily Courtney Olsen MD   10 mg at 09/03/20 1244    fluticasone (FLONASE) 50 MCG/ACT nasal spray 2 spray  2 spray Nasal Daily Courtney Olsen MD   2 spray at 09/03/20 1248    lactulose (CHRONULAC) 10 GM/15ML solution 30 g  30 g Oral TID Courtney Olsen MD        losartan (COZAAR) tablet 50 mg  50 mg Oral BID Courtney Olsen MD   50 mg at 09/03/20 1243    pravastatin (PRAVACHOL) tablet 10 mg  10 mg Oral Nightly Courtney Olsen MD   10 mg at 09/02/20 2025    sevelamer (RENVELA) tablet 800 mg  800 mg Oral Daily Courtney Olsen MD   800 mg at 09/03/20 1243    sodium chloride flush 0.9 % injection 10 mL  10 mL Intravenous 2 times per day Courtney Olsen MD   10 mL at 09/03/20 1244    sodium chloride flush 0.9 % injection 10 mL  10 mL Intravenous PRN Courtney Olsen MD        potassium chloride (KLOR-CON M) extended release tablet 40 mEq  40 mEq Oral PRN Courtney Olsen MD        Or    potassium bicarb-citric acid (EFFER-K) effervescent tablet 40 mEq  40 mEq Oral PRN Courtney Olsen MD        Or   Prasanna Gustafson potassium chloride 10 mEq/100 mL IVPB (Peripheral Line)  10 mEq Intravenous PRN Courtney Olsen MD        magnesium sulfate 2 g in 50 mL IVPB premix  2 g Intravenous PRN Courtney Olsen MD        acetaminophen (TYLENOL) tablet 650 mg  650 mg Oral Q6H PRN Courtney Olsen MD   650 mg at 09/03/20 8996    Or    acetaminophen (TYLENOL) suppository 650 mg  650 mg Rectal Q6H PRN Courtney Olsen MD        polyethylene glycol (GLYCOLAX) packet 17 g  17 g Oral Daily PRN Courtney Olsen MD        promethazine (PHENERGAN) tablet 12.5 mg  12.5 mg Oral Q6H PRN Courtney Olsen MD        Or    ondansetron [facility]) injection 4 mg  4 mg Intravenous Q6H PRN Courtney Olsen MD        heparin (porcine) injection 5,000 Units  5,000 Units Subcutaneous 3 times per day Courtney Olsen MD   5,000 Units at 09/03/20 0734    glucose (GLUTOSE) 40 % oral gel 15 g  15 g Oral PRN Courtney Olsen MD        dextrose 50 % IV solution  12.5 g Intravenous PRN Ankit Mercedes MD        glucagon (rDNA) injection 1 mg  1 mg Intramuscular PRN Ankit Mercedes MD        dextrose 5 % solution  100 mL/hr Intravenous PRN Ankit Mercedes MD        insulin glargine (LANTUS) injection vial 10 Units  10 Units Subcutaneous Nightly Ankit Mercedes MD   10 Units at 09/02/20 2025    insulin lispro (HUMALOG) injection vial 0-12 Units  0-12 Units Subcutaneous TID WC Ankit Mercedes MD   4 Units at 09/02/20 1245    insulin lispro (HUMALOG) injection vial 0-6 Units  0-6 Units Subcutaneous Nightly Ankit Mercedes MD   2 Units at 09/01/20 2217    ipratropium-albuterol (DUONEB) nebulizer solution 1 ampule  1 ampule Inhalation Q4H WA Ankit Mercedes MD   1 ampule at 09/03/20 1044    albuterol (PROVENTIL) nebulizer solution 2.5 mg  2.5 mg Nebulization Q6H PRN Ankit Mercedes MD        nicotine (Ranelle Quirk) 21 MG/24HR 1 patch  1 patch Transdermal Daily Ankit Mercedes MD   1 patch at 09/03/20 1246       Past Medical History:  Past Medical History:   Diagnosis Date    Blind right eye     partial vision loss in the L eye too, due to DM    Blindness of one eye     Right     CHF (congestive heart failure) ([address]     CKD (chronic kidney disease), stage IV ([address]     secondary to diabetic nephropathy    Diabetes ([address]     Diabetes mellitus with ophthalmic manifestations     dx'd in 2000 but likely ongoing before that, was dx'd when she went blind in the R eye    Glaucoma     Hemodialysis patient ([facility] [address]     dialysis tutrixie, thurs, sat. [facility]    Hypertension     2000, dx'd at same time as the DM    Obesity     Renal osteodystrophy     Smoker     Type II or unspecified type diabetes mellitus with renal manifestations, uncontrolled(250.42)        Past Surgical History:  Past Surgical History:   Procedure Laterality Date    CARPAL TUNNEL RELEASE      CATARACT REMOVAL     [address] [facility] and [facility]    Liver Cancer Neg Hx     Liver Disease Neg Hx     Esophageal Cancer Neg Hx     Rectal Cancer Neg Hx     Stomach Cancer Neg Hx        Social History  Social History     Socioeconomic History    Marital status:      Spouse name: Not on file    Number of children: Not on file    Years of education: Not on file    Highest education level: Not on file   Occupational History    Not on file   Social Needs    Financial resource strain: Not on file    Food insecurity     Worry: Not on file     Inability: Not on file    Transportation needs     Medical: Not on file     Non-medical: Not on file   Tobacco Use    Smoking status: Current Every Day Smoker     Packs/day: 1.00     Years: 40.00     Pack years: 40.00    Smokeless tobacco: Never Used   Substance and Sexual Activity    Alcohol use: No     Alcohol/week: 0.0 standard drinks    Drug use: Yes     Types: Marijuana    Sexual activity: Not Currently     Partners: Male   Lifestyle    Physical activity     Days per week: Not on file     Minutes per session: Not on file    Stress: Not on file   Relationships    Social connections     Talks on phone: Not on file     Gets together: Not on file     Attends Restorationist service: Not on file     Active member of club or organization: Not on file     Attends meetings of clubs or organizations: Not on file     Relationship status: Not on file    Intimate partner violence     Fear of current or ex partner: Not on file     Emotionally abused: Not on file     Physically abused: Not on file     Forced sexual activity: Not on file   Other Topics Concern    Not on file   Social History Narrative    Not on file         Review of Systems:  History obtained from chart review and the patient  General ROS: No fever or chills  Respiratory ROS: No cough, shortness of breath  Cardiovascular ROS: No chest pain or palpitations  Gastrointestinal ROS: No abdominal pain or melena  Genito-Urinary ROS: No dysuria or hours.  Phosphorus:No results for input(s): PHOS in the last 72 hours. HgbA1C: No results for input(s): LABA1C in the last 72 hours. Hepatic:   Recent Labs     09/01/20  1159   ALKPHOS 496*   ALT 11   AST 20   PROT 6.9   BILITOT 0.7   LABALBU 3.5     Lactic Acid: No results for input(s): LACTA in the last 72 hours. Troponin:   Recent Labs     09/01/20  1746   CKTOTAL 50     ABGs: No results for input(s): PH, PCO2, PO2, HCO3, O2SAT in the last 72 hours. CRP:  No results for input(s): CRP in the last 72 hours. Sed Rate:  No results for input(s): SEDRATE in the last 72 hours. Cultures:   No results for input(s): CULTURE in the last 72 hours. No results for input(s): BC, Halley Edwar in the last 72 hours. No results for input(s): CXSURG in the last 72 hours. Radiology reports as per the Radiologist  Radiology: Ct Head Wo Contrast    Result Date: 9/1/2020  CT HEAD WO CONTRAST 9/1/2020 11:55 AM History: L status changes, dialysis patient Comparisons: None Technique: Radiation dose equals  mGy cm. AUTOMATED EXPOSURE CONTROL dose reduction technique was implemented CT evaluation of the head without intravenous contrast. 5 mm transaxial images were obtained. 2-D sagittal and coronal reconstruction images were generated. Findings: There is central and cortical atrophy. There is no intra or extra-axial hemorrhage. There is no mass effect or midline shift. There is no hydrocephalus. Paranasal sinuses imaged in part are clear. Bone window imaging demonstrates no skull fracture acute calvarial abnormality. Postsurgical changes associated with the left globe noted. Impression: 1. No CT evidence of an acute intracranial process. Signed by Dr Aniya Maurice on 9/1/2020 12:58 PM    Xr Chest Portable    Result Date: 9/1/2020  XR CHEST PORTABLE 9/1/2020 11:34 AM HISTORY:   Hemodialysis patient. Confusion and fatigue. Shortness of breath  Single view.  COMPARISONS:  7/11/2020, 6/8/2020 and

## 2020-09-03 NOTE — CARE COORDINATION
Date / Time of Evaluation: 9/3/2020 2:35 PM  Assessment Completed by: Sohail Krueger    Patient Admission Status: Inpatient [101]      Current PCP:  RAMONA Kerns  PCP verified? yes    Initial Assessment Completed at bedside with:  Patient and significant other     Emergency Contacts:  Extended Emergency Contact Information  Primary Emergency Contact: [address] of [address] Phone: [phone]  Mobile Phone: [phone]  Relation: Other    Advance Directives: Code Status:  Full Code    Financial:  Payor: Stephen Sharp / Plan: MEDICARE PART A AND B / Product Type: *No Product type* /     Pre-Cert required for SNF:  na    Pharmacy:   [id number] - [address] [phone] [facility] [phone]  [address] [address] [facility]  [address] [place] [address]  Phone: [phone] Fax: [phone]      Potential assistance purchasing medications? no    ADLS:  Support System:  Spouse/Significant Other    Current Home Environment:  home  Steps:  no    Plans to RETURN to current housing: yes  Barriers to RETURNING to current housing:      Currently ACTIVE with Home Health CARE:  no  [facility]:      DME Provider:      Had [facility] prior to admission:  no  Oxygen Company:    Informed of need to bring portable home O2 tank to hospital on day of DISCHARGE:    Name of person committed to bringing portable tank at discharge: Active with HD/PD prior to admission: yes  Nephrologist:    HD Center:  [address] [id number]    Transition Plan:  home with SO; established at [facility] for Discharge:  Significant other     Factors facilitating achievement of predicted outcomes:     Barriers to discharge: Additional CM/SW Notes: Per pt , she lives with her significant other. Plans on same upon dc. PT takes the bus to and from HD clinic with difficulty. Has a walker and wc  At home if needed. Pt does not widh to have [place] at this time.  No oxygen needs noted. Educated pt on fluid intake compliance at home and to follow up with her nephrologist and PCP as scheduled . No other needs noted at this time  Electronically signed by Chet Helton RN on 9/3/2020 at 2:48 PM            Rigoberto Or and/or her family were provided with choice of provider.         Chet Helton RN  [facility]  Care Management Department  Ph:  [phone]   Fax:

## 2020-09-03 NOTE — DISCHARGE SUMMARY
Discharge Summary      Date:9/3/2020        Patient Bryce Hughes     YOB: 1960     Age:60 y.o. Admit Date:9/1/2020   Admission Condition:fair   Discharged Condition:stable  Discharge Date: 09/03/20       Discharge Diagnoses   Active Problems:    Acute respiratory failure with hypoxia and hypercarbia (HCC)  Resolved Problems:    * No resolved hospital problems. [facility] Stay   Narrative of Hospital Course:     64-year-old  female with a known past medical history of end-stage renal disease dialysis Tuesday Thursday Saturday, history of diastolic congestive heart failure, blindness in the right eye, type 2 diabetes, hypertension, tobacco abuse, hyperlipidemia who presented to emergency room complaints of worsening confusion. Work-up unremarkable U tox, ammonia alcohol level within normal limits. Urinalysis with negative nitrites and leukocyte esterase.  Patient was found to be hypoxic on room air as well as hypercarbic by ABG, head CT no acute intracranial process, evidence of right-sided pleural effusion with right lower lobe atelectasis on checks x-ray imaging. Was noted to have started Neurontin outpatient for neuropathy, which has been held in-house and patient mentation back to baseline. Seen by nephrology in-house for maintenance run of dialysis, to resume outpt schedule and gabapentin decreased to 100mg nightly at discharge. Patient to follow-up with PCP for continuous management for chronic medical conditions. Physical Exam  General: No acute distress lying comfortably in bed. HEENT: Atraumatic normocephalic  Cardiac: Normal S1-S2 no murmurs rub or gallop. Respiratory: clear To auscultation bilaterally, no rhonchi or rales  Abdomen: nontender to palpation, no organomegaly noted.   Extremities: no tenderness, no edema, moves all extremities  Psych: Affect normal and good eye contact          Consultants:   IP CONSULT TO HOSPITALIST  IP CONSULT TO tablet  Commonly known as:  COZAAR  TAKE 1 TABLET BY MOUTH TWICE DAILY     midodrine 5 MG tablet  Commonly known as:  PROAMATINE     ondansetron 4 MG tablet  Commonly known as:  ZOFRAN     pravastatin 10 MG tablet  Commonly known as:  PRAVACHOL  TAKE 1 TABLET BY MOUTH EVERY NIGHT AT BEDTIME     sevelamer 800 MG tablet  Commonly known as:  RENVELA     timolol 0.5 % ophthalmic gel-forming  Commonly known as:  TIMOPTIC-XE     Travatan Z 0.004 % Soln ophthalmic solution  Generic drug:  Travoprost (ALLISON Free)     Vitamin D3 25 MCG (1000 UT) Caps     ZYRTEC ALLERGY PO         * This list has 2 medication(s) that are the same as other medications prescribed for you. Read the directions carefully, and ask your doctor or other care provider to review them with you.                Where to Get Your Medications      Information about where to get these medications is not yet available    Ask your nurse or doctor about these medications  · gabapentin 100 MG capsule         Electronically signed by Shannan Mendenhall MD on 9/3/20 at 1:01 PM CDT

## 2020-09-03 NOTE — PLAN OF CARE
Problem: Falls - Risk of:  Goal: Will remain free from falls  Description: Will remain free from falls  9/3/2020 0039 by Fer Ngo  Outcome: Ongoing  9/2/2020 1618 by Nav Cortez RN  Outcome: Ongoing  Goal: Absence of physical injury  Description: Absence of physical injury  Outcome: Ongoing     Problem:  Activity:  Goal: Risk for activity intolerance will decrease  Description: Risk for activity intolerance will decrease  Outcome: Ongoing     Problem: Coping:  Goal: Ability to adjust to condition or change in health will improve  Description: Ability to adjust to condition or change in health will improve  Outcome: Ongoing     Problem: Fluid Volume:  Goal: Ability to maintain a balanced intake and output will improve  Description: Ability to maintain a balanced intake and output will improve  9/3/2020 0039 by Fer Ngo  Outcome: Ongoing  9/2/2020 1618 by Nav Cortez RN  Outcome: Ongoing     Problem: Health Behavior:  Goal: Ability to identify and utilize available resources and services will improve  Description: Ability to identify and utilize available resources and services will improve  Outcome: Ongoing  Goal: Ability to manage health-related needs will improve  Description: Ability to manage health-related needs will improve  Outcome: Ongoing     Problem: Metabolic:  Goal: Ability to maintain appropriate glucose levels will improve  Description: Ability to maintain appropriate glucose levels will improve  9/3/2020 0039 by Fer Ngo  Outcome: Ongoing  9/2/2020 1618 by Nav Cortez RN  Outcome: Ongoing     Problem: Nutritional:  Goal: Maintenance of adequate nutrition will improve  Description: Maintenance of adequate nutrition will improve  Outcome: Ongoing  Goal: Progress toward achieving an optimal weight will improve  Description: Progress toward achieving an optimal weight will improve  Outcome: Ongoing     Problem: Physical Regulation:  Goal: Complications related to the disease process, condition or treatment will be avoided or minimized  Description: Complications related to the disease process, condition or treatment will be avoided or minimized  Outcome: Ongoing  Goal: Diagnostic test results will improve  Description: Diagnostic test results will improve  Outcome: Ongoing     Problem: Skin Integrity:  Goal: Risk for impaired skin integrity will decrease  Description: Risk for impaired skin integrity will decrease  9/3/2020 0039 by Ellen Giles  Outcome: Ongoing  9/2/2020 1618 by Linda Fay RN  Outcome: Ongoing     Problem: Tissue Perfusion:  Goal: Adequacy of tissue perfusion will improve  Description: Adequacy of tissue perfusion will improve  9/3/2020 0039 by Ellen Giles  Outcome: Ongoing  9/2/2020 1618 by Linda Fay RN  Outcome: Ongoing     Problem: Fluid Volume - Imbalance:  Goal: Absence of imbalanced fluid volume signs and symptoms  Description: Absence of imbalanced fluid volume signs and symptoms  9/3/2020 0039 by Ellen Giles  Outcome: Ongoing  9/2/2020 1618 by Linda Fay RN  Outcome: Ongoing

## 2020-09-04 ENCOUNTER — CARE COORDINATION (OUTPATIENT)
Dept: CASE MANAGEMENT | Age: 60
End: 2020-09-04

## 2020-09-04 NOTE — CARE COORDINATION
[facility] Transitions Initial Follow Up Call    Call within 2 business days of discharge: Yes    Patient: Yasir Taylor Patient : 1960   MRN: [id number]  Reason for Admission:   Discharge Date: 9/3/20 RARS: Readmission Risk Score: 44      Last Discharge [address] [address]       Complaint Diagnosis Description Type Department Provider    20 Altered Mental Status; Fatigue Disorientation . .. ED to Hosp-Admission (Discharged) (ADMITTED) [facility] MED SURG Kimberly Mayen MD; Layla Contreras. .. Spoke with: N/A    Facility: [place] [id number]    Non-face-to-face services provided:  Reviewed encounter information for continuity of care prior to follow up phone call - chart notes, consults, progress notes, test results, med list, appointments, AVS, other information. Care Transitions 24 Hour Call    Do you have all of your prescriptions and are they filled?:  Yes  Care Transitions Interventions         Follow Up : Attempted to make contact with Jose Forrester for CTC initial follow up call post discharge from the hospital without success. Unable to leave a message regarding intent of call and call back information. Will try again my next business day.      Future Appointments   Date Time Provider Darrell Price   2020 10:45 AM Parish [place], APRN [facility]   2020  2:00 PM [facility] LAB ROOM 2 [facility] LAB [facility]   2020 10:00 AM RAMONA White [facility]       Nina Mallory RN

## 2020-09-05 ENCOUNTER — CARE COORDINATION (OUTPATIENT)
Dept: CASE MANAGEMENT | Age: 60
End: 2020-09-05

## 2020-09-05 NOTE — CARE COORDINATION
[facility] Transitions Initial Follow Up Call    Call within 2 business days of discharge: Yes    Patient: Ghassan Hillman Patient : 1960   MRN: [id number]  Reason for Admission:   Discharge Date: 9/3/20 RARS: Readmission Risk Score: 44      Last Discharge [facility]       Complaint Diagnosis Description Type Department Provider    20 Altered Mental Status; Fatigue Disorientation . .. ED to Hosp-Admission (Discharged) (ADMITTED) [facility] MED SURG Immanuel Ocasio MD; Sobeida La. .. Spoke with: N/A    Facility: [place] [address]    Non-face-to-face services provided:  Reviewed encounter information for continuity of care prior to follow up phone call - chart notes, consults, progress notes, test results, med list, appointments, AVS, other information. Care Transitions 24 Hour Call    Do you have all of your prescriptions and are they filled?:  Yes  Care Transitions Interventions         Follow Up : Attempt #2 to make contact with Wallace Goetz for CTC initial follow up call post discharge from the hospital without success. Unable to leave a message regarding intent of call and call back information. Will try again my next business day.      Future Appointments   Date Time Provider Darrell Price   2020 10:45 AM RAMONA Dominguez [facility]   2020  2:00 PM [facility] LAB ROOM 2 [facility] LAB [facility]   2020 10:00 AM RAMONA Dominguez [facility]       Izabella Malik RN

## 2020-09-08 ENCOUNTER — CARE COORDINATION (OUTPATIENT)
Dept: CASE MANAGEMENT | Age: 60
End: 2020-09-08

## 2020-09-08 NOTE — CARE COORDINATION
[facility] Transitions Initial Follow Up Call    Call within 2 business days of discharge: Yes    Patient: Bruno Whitney Patient : 1960   MRN: [id number]  Reason for Admission:   Discharge Date: 9/3/20 RARS: Readmission Risk Score: 44      Last Discharge [address] [address]       Complaint Diagnosis Description Type Department Provider    20 Altered Mental Status; Fatigue Disorientation . .. ED to Hosp-Admission (Discharged) (ADMITTED) [facility] MED SURG Jenni Paul MD; Dileep Monterroso. .. Spoke with: N/A    Facility: [place] [address]    Non-face-to-face services provided:  Reviewed encounter information for continuity of care prior to follow up phone call - chart notes, consults, progress notes, test results, med list, appointments, AVS, other information. Care Transitions 24 Hour Call    Do you have all of your prescriptions and are they filled?:  Yes  Care Transitions Interventions         Follow Up : Attempt #3 to make contact with Guadalupe Jakob for CTC initial follow up call post discharge from the hospital without success. Unable to leave a message regarding intent of call and call back information. Will discharge from CTN calls due to inability to make contact with patient.      Future Appointments   Date Time Provider Darrell Price   2020 10:45 AM RAMONA Roa [facility]   2020  2:00 PM [facility] LAB ROOM 2 [facility] LAB [facility]   2020 10:00 AM RAMONA Roa [facility]       Belia Meng RN

## 2020-09-16 ENCOUNTER — HOSPITAL ENCOUNTER (OUTPATIENT)
Dept: VASCULAR LAB | Age: 60
Discharge: HOME | End: 2020-09-16
Payer: MEDICARE

## 2020-09-16 PROCEDURE — 93923 UPR/LXTR ART STDY 3+ LVLS: CPT

## 2020-09-23 ENCOUNTER — TELEPHONE (OUTPATIENT)
Dept: PRIMARY CARE CLINIC | Age: 60
End: 2020-09-23

## 2020-09-25 RX ORDER — LOSARTAN POTASSIUM 50 MG/1
TABLET ORAL
Qty: 180 TABLET | Refills: 0 | Status: SHIPPED | OUTPATIENT
Start: 2020-09-25 | End: 2020-12-24

## 2020-09-30 ENCOUNTER — VIRTUAL VISIT (OUTPATIENT)
Dept: PRIMARY CARE CLINIC | Age: 60
End: 2020-09-30
Payer: MEDICARE

## 2020-09-30 PROCEDURE — 99443 PR PHYS/QHP TELEPHONE EVALUATION 21-30 MIN: CPT | Performed by: NURSE PRACTITIONER

## 2020-09-30 ASSESSMENT — ENCOUNTER SYMPTOMS
GASTROINTESTINAL NEGATIVE: 1
RESPIRATORY NEGATIVE: 1
EYES NEGATIVE: 1

## 2020-09-30 NOTE — PROGRESS NOTES
[address] [place] [address]            Phone:  [phone]  Fax:  [phone]    Bar Saravia is a 56 y.o. female evaluated via telephone on 9/30/2020. Consent:    She and/or health care decision maker is aware that that she may receive a bill for this telephone service, depending on her insurance coverage, and has provided verbal consent to proceed: Yes      HPI  Chief Complaint   Patient presents with    Chronic Kidney Disease    Diabetes       I communicated with the patient and/or health care decision maker about follow up on chronic conditions including CKD. She restarted dialysis yesterday. Blood sugars have been 130 fasting. She reports overall that things have been improving. She was recently in the hospital this month and denies any issues or concerns with that. Review of Systems   Constitutional: Positive for fatigue. HENT: Negative. Eyes: Negative. Respiratory: Negative. Cardiovascular: Negative. Gastrointestinal: Negative. Endocrine: Negative. Genitourinary: Negative. Musculoskeletal: Negative. Skin: Negative. Neurological: Negative. Hematological: Negative. Psychiatric/Behavioral: Negative. PLAN    Details of this discussion including any medical advice provided:     1. Essential hypertension      2. Claudication ([facility] [address]      3. CKD (chronic kidney disease) stage V requiring chronic dialysis ([facility] [address]      4. Neuropathy      5. Counseling, unspecified      Patient is aware of referral to vascular due to claudication and abnormal vascular study. She is to continue all current medications as prescribed. She is to follow-up with dialysis as well due to CKD. I affirm this is a Patient Initiated Episode with an Established Patient who has not had a related appointment within my department in the past 7 days or scheduled within the next 24 hours.       Total Time: minutes: 21-30 minutes      Note: not billable if this call serves to triage the patient into an appointment for the relevant concern      Nancy Latif         Pursuant to the emergency declaration under the [facility] waiver authority and the [employer] Act, this Virtual  Visit was conducted, with patient's consent, to reduce the patient's risk of exposure to COVID-19 and provide continuity of care for an established patient. Services were provided through a telephone discussion  to substitute for in-person clinic visit. Parties involved during telephone call include myself, RAMONA Virk and patient listed.

## 2020-10-07 ENCOUNTER — OFFICE VISIT (OUTPATIENT)
Dept: VASCULAR SURGERY | Age: 60
End: 2020-10-07
Payer: MEDICARE

## 2020-10-07 VITALS
HEIGHT: 59 IN | TEMPERATURE: 97.6 F | WEIGHT: 165 LBS | SYSTOLIC BLOOD PRESSURE: 183 MMHG | HEART RATE: 76 BPM | BODY MASS INDEX: 33.26 KG/M2 | OXYGEN SATURATION: 97 % | RESPIRATION RATE: 16 BRPM | DIASTOLIC BLOOD PRESSURE: 78 MMHG

## 2020-10-07 PROCEDURE — G8427 DOCREV CUR MEDS BY ELIG CLIN: HCPCS | Performed by: SURGERY

## 2020-10-07 PROCEDURE — 99213 OFFICE O/P EST LOW 20 MIN: CPT | Performed by: SURGERY

## 2020-10-07 PROCEDURE — G8417 CALC BMI ABV UP PARAM F/U: HCPCS | Performed by: SURGERY

## 2020-10-07 PROCEDURE — 4004F PT TOBACCO SCREEN RCVD TLK: CPT | Performed by: SURGERY

## 2020-10-07 PROCEDURE — G8484 FLU IMMUNIZE NO ADMIN: HCPCS | Performed by: SURGERY

## 2020-10-07 PROCEDURE — 3017F COLORECTAL CA SCREEN DOC REV: CPT | Performed by: SURGERY

## 2020-10-08 ENCOUNTER — TELEPHONE (OUTPATIENT)
Dept: VASCULAR SURGERY | Age: 60
End: 2020-10-08

## 2020-10-10 ENCOUNTER — APPOINTMENT (OUTPATIENT)
Dept: GENERAL RADIOLOGY | Age: 60
End: 2020-10-10
Payer: MEDICARE

## 2020-10-10 ENCOUNTER — HOSPITAL ENCOUNTER (OUTPATIENT)
Age: 60
Setting detail: OBSERVATION
Discharge: HOME | End: 2020-10-10
Attending: EMERGENCY MEDICINE | Admitting: FAMILY MEDICINE
Payer: MEDICARE

## 2020-10-10 VITALS
SYSTOLIC BLOOD PRESSURE: 154 MMHG | HEIGHT: 59 IN | TEMPERATURE: 97.2 F | HEART RATE: 78 BPM | DIASTOLIC BLOOD PRESSURE: 69 MMHG | OXYGEN SATURATION: 91 % | BODY MASS INDEX: 32.86 KG/M2 | RESPIRATION RATE: 18 BRPM | WEIGHT: 163 LBS

## 2020-10-10 LAB
ALBUMIN SERPL-MCNC: 3.7 G/DL (ref 3.5–5.2)
ALP BLD-CCNC: 298 U/L (ref 35–104)
ALT SERPL-CCNC: 9 U/L (ref 5–33)
ANION GAP SERPL CALCULATED.3IONS-SCNC: 19 MMOL/L (ref 7–19)
AST SERPL-CCNC: 21 U/L (ref 5–32)
BASOPHILS ABSOLUTE: 0 K/UL (ref 0–0.2)
BASOPHILS RELATIVE PERCENT: 0.6 % (ref 0–1)
BILIRUB SERPL-MCNC: 0.5 MG/DL (ref 0.2–1.2)
BUN BLDV-MCNC: 65 MG/DL (ref 8–23)
CALCIUM SERPL-MCNC: 9.3 MG/DL (ref 8.8–10.2)
CHLORIDE BLD-SCNC: 95 MMOL/L (ref 98–111)
CO2: 26 MMOL/L (ref 22–29)
CREAT SERPL-MCNC: 7.2 MG/DL (ref 0.5–0.9)
EOSINOPHILS ABSOLUTE: 0.1 K/UL (ref 0–0.6)
EOSINOPHILS RELATIVE PERCENT: 2 % (ref 0–5)
GFR AFRICAN AMERICAN: 7
GFR NON-AFRICAN AMERICAN: 6
GLUCOSE BLD-MCNC: 137 MG/DL (ref 70–99)
GLUCOSE BLD-MCNC: 164 MG/DL (ref 74–109)
HCT VFR BLD CALC: 37.7 % (ref 37–47)
HEMOGLOBIN: 11.4 G/DL (ref 12–16)
IMMATURE GRANULOCYTES #: 0 K/UL
LYMPHOCYTES ABSOLUTE: 0.8 K/UL (ref 1.1–4.5)
LYMPHOCYTES RELATIVE PERCENT: 17 % (ref 20–40)
MCH RBC QN AUTO: 27.8 PG (ref 27–31)
MCHC RBC AUTO-ENTMCNC: 30.2 G/DL (ref 33–37)
MCV RBC AUTO: 92 FL (ref 81–99)
MONOCYTES ABSOLUTE: 0.5 K/UL (ref 0–0.9)
MONOCYTES RELATIVE PERCENT: 10.7 % (ref 0–10)
NEUTROPHILS ABSOLUTE: 3.4 K/UL (ref 1.5–7.5)
NEUTROPHILS RELATIVE PERCENT: 69.5 % (ref 50–65)
PDW BLD-RTO: 17.7 % (ref 11.5–14.5)
PERFORMED ON: ABNORMAL
PLATELET # BLD: 213 K/UL (ref 130–400)
PMV BLD AUTO: 9.8 FL (ref 9.4–12.3)
POTASSIUM SERPL-SCNC: 4.3 MMOL/L (ref 3.5–5)
PRO-BNP: ABNORMAL PG/ML (ref 0–900)
RBC # BLD: 4.1 M/UL (ref 4.2–5.4)
SARS-COV-2, NAAT: NOT DETECTED
SODIUM BLD-SCNC: 140 MMOL/L (ref 136–145)
TOTAL PROTEIN: 6.6 G/DL (ref 6.6–8.7)
TROPONIN: 0.12 NG/ML (ref 0–0.03)
TROPONIN: 0.13 NG/ML (ref 0–0.03)
WBC # BLD: 4.9 K/UL (ref 4.8–10.8)

## 2020-10-10 PROCEDURE — U0002 COVID-19 LAB TEST NON-CDC: HCPCS

## 2020-10-10 PROCEDURE — 6360000002 HC RX W HCPCS: Performed by: EMERGENCY MEDICINE

## 2020-10-10 PROCEDURE — 96375 TX/PRO/DX INJ NEW DRUG ADDON: CPT

## 2020-10-10 PROCEDURE — 84484 ASSAY OF TROPONIN QUANT: CPT

## 2020-10-10 PROCEDURE — G0378 HOSPITAL OBSERVATION PER HR: HCPCS

## 2020-10-10 PROCEDURE — 6360000002 HC RX W HCPCS: Performed by: HOSPITALIST

## 2020-10-10 PROCEDURE — 8010000000 HC HEMODIALYSIS ACUTE INPT

## 2020-10-10 PROCEDURE — 93005 ELECTROCARDIOGRAM TRACING: CPT | Performed by: EMERGENCY MEDICINE

## 2020-10-10 PROCEDURE — 99285 EMERGENCY DEPT VISIT HI MDM: CPT

## 2020-10-10 PROCEDURE — 99284 EMERGENCY DEPT VISIT MOD MDM: CPT

## 2020-10-10 PROCEDURE — 99999 PR OFFICE/OUTPT VISIT,PROCEDURE ONLY: CPT | Performed by: EMERGENCY MEDICINE

## 2020-10-10 PROCEDURE — 94640 AIRWAY INHALATION TREATMENT: CPT

## 2020-10-10 PROCEDURE — 96372 THER/PROPH/DIAG INJ SC/IM: CPT

## 2020-10-10 PROCEDURE — 83880 ASSAY OF NATRIURETIC PEPTIDE: CPT

## 2020-10-10 PROCEDURE — 2580000003 HC RX 258: Performed by: HOSPITALIST

## 2020-10-10 PROCEDURE — 6370000000 HC RX 637 (ALT 250 FOR IP): Performed by: HOSPITALIST

## 2020-10-10 PROCEDURE — 96374 THER/PROPH/DIAG INJ IV PUSH: CPT

## 2020-10-10 PROCEDURE — 80053 COMPREHEN METABOLIC PANEL: CPT

## 2020-10-10 PROCEDURE — 82947 ASSAY GLUCOSE BLOOD QUANT: CPT

## 2020-10-10 PROCEDURE — 71045 X-RAY EXAM CHEST 1 VIEW: CPT

## 2020-10-10 PROCEDURE — 36415 COLL VENOUS BLD VENIPUNCTURE: CPT

## 2020-10-10 PROCEDURE — 85025 COMPLETE CBC W/AUTO DIFF WBC: CPT

## 2020-10-10 RX ORDER — INSULIN GLARGINE 100 [IU]/ML
10 INJECTION, SOLUTION SUBCUTANEOUS NIGHTLY
Status: DISCONTINUED | OUTPATIENT
Start: 2020-10-10 | End: 2020-10-10 | Stop reason: HOSPADM

## 2020-10-10 RX ORDER — SODIUM CHLORIDE 0.9 % (FLUSH) 0.9 %
10 SYRINGE (ML) INJECTION EVERY 12 HOURS SCHEDULED
Status: DISCONTINUED | OUTPATIENT
Start: 2020-10-10 | End: 2020-10-10 | Stop reason: HOSPADM

## 2020-10-10 RX ORDER — HOMATROPINE HYDROBROMIDE OPHTHALMIC 50 MG/ML
1 SOLUTION OPHTHALMIC DAILY
Status: DISCONTINUED | OUTPATIENT
Start: 2020-10-10 | End: 2020-10-10 | Stop reason: HOSPADM

## 2020-10-10 RX ORDER — FLUTICASONE PROPIONATE 50 MCG
2 SPRAY, SUSPENSION (ML) NASAL DAILY
Status: DISCONTINUED | OUTPATIENT
Start: 2020-10-10 | End: 2020-10-10 | Stop reason: HOSPADM

## 2020-10-10 RX ORDER — FUROSEMIDE 10 MG/ML
80 INJECTION INTRAMUSCULAR; INTRAVENOUS ONCE
Status: COMPLETED | OUTPATIENT
Start: 2020-10-10 | End: 2020-10-10

## 2020-10-10 RX ORDER — LATANOPROST 50 UG/ML
1 SOLUTION/ DROPS OPHTHALMIC NIGHTLY
Status: DISCONTINUED | OUTPATIENT
Start: 2020-10-10 | End: 2020-10-10 | Stop reason: HOSPADM

## 2020-10-10 RX ORDER — DICYCLOMINE HYDROCHLORIDE 10 MG/1
10 CAPSULE ORAL 4 TIMES DAILY
Status: DISCONTINUED | OUTPATIENT
Start: 2020-10-10 | End: 2020-10-10 | Stop reason: HOSPADM

## 2020-10-10 RX ORDER — ACETAMINOPHEN 325 MG/1
650 TABLET ORAL EVERY 6 HOURS PRN
Status: DISCONTINUED | OUTPATIENT
Start: 2020-10-10 | End: 2020-10-10 | Stop reason: HOSPADM

## 2020-10-10 RX ORDER — ACETAMINOPHEN 650 MG/1
650 SUPPOSITORY RECTAL EVERY 6 HOURS PRN
Status: DISCONTINUED | OUTPATIENT
Start: 2020-10-10 | End: 2020-10-10 | Stop reason: HOSPADM

## 2020-10-10 RX ORDER — MORPHINE SULFATE 4 MG/ML
4 INJECTION, SOLUTION INTRAMUSCULAR; INTRAVENOUS ONCE
Status: DISCONTINUED | OUTPATIENT
Start: 2020-10-10 | End: 2020-10-10

## 2020-10-10 RX ORDER — ALBUTEROL SULFATE 2.5 MG/3ML
2.5 SOLUTION RESPIRATORY (INHALATION) EVERY 4 HOURS PRN
Status: DISCONTINUED | OUTPATIENT
Start: 2020-10-10 | End: 2020-10-10 | Stop reason: HOSPADM

## 2020-10-10 RX ORDER — IPRATROPIUM BROMIDE AND ALBUTEROL SULFATE 2.5; .5 MG/3ML; MG/3ML
1 SOLUTION RESPIRATORY (INHALATION) EVERY 4 HOURS
Status: DISCONTINUED | OUTPATIENT
Start: 2020-10-10 | End: 2020-10-10 | Stop reason: HOSPADM

## 2020-10-10 RX ORDER — BRIMONIDINE TARTRATE 2 MG/ML
1 SOLUTION/ DROPS OPHTHALMIC 3 TIMES DAILY
Status: DISCONTINUED | OUTPATIENT
Start: 2020-10-10 | End: 2020-10-10 | Stop reason: HOSPADM

## 2020-10-10 RX ORDER — PRAVASTATIN SODIUM 10 MG
10 TABLET ORAL NIGHTLY
Status: DISCONTINUED | OUTPATIENT
Start: 2020-10-10 | End: 2020-10-10 | Stop reason: HOSPADM

## 2020-10-10 RX ORDER — ONDANSETRON 4 MG/1
4 TABLET, ORALLY DISINTEGRATING ORAL EVERY 8 HOURS PRN
Status: DISCONTINUED | OUTPATIENT
Start: 2020-10-10 | End: 2020-10-10 | Stop reason: HOSPADM

## 2020-10-10 RX ORDER — INSULIN GLARGINE 100 [IU]/ML
10 INJECTION, SOLUTION SUBCUTANEOUS EVERY MORNING
Status: DISCONTINUED | OUTPATIENT
Start: 2020-10-10 | End: 2020-10-10

## 2020-10-10 RX ORDER — ONDANSETRON 2 MG/ML
4 INJECTION INTRAMUSCULAR; INTRAVENOUS EVERY 6 HOURS PRN
Status: DISCONTINUED | OUTPATIENT
Start: 2020-10-10 | End: 2020-10-10 | Stop reason: HOSPADM

## 2020-10-10 RX ORDER — FAMOTIDINE 20 MG/1
20 TABLET, FILM COATED ORAL 2 TIMES DAILY
Status: DISCONTINUED | OUTPATIENT
Start: 2020-10-10 | End: 2020-10-10 | Stop reason: HOSPADM

## 2020-10-10 RX ORDER — DORZOLAMIDE HCL 20 MG/ML
1 SOLUTION/ DROPS OPHTHALMIC 2 TIMES DAILY
Status: DISCONTINUED | OUTPATIENT
Start: 2020-10-10 | End: 2020-10-10 | Stop reason: HOSPADM

## 2020-10-10 RX ORDER — ASPIRIN 81 MG/1
81 TABLET ORAL DAILY
Status: DISCONTINUED | OUTPATIENT
Start: 2020-10-10 | End: 2020-10-10 | Stop reason: HOSPADM

## 2020-10-10 RX ORDER — SODIUM CHLORIDE 0.9 % (FLUSH) 0.9 %
10 SYRINGE (ML) INJECTION PRN
Status: DISCONTINUED | OUTPATIENT
Start: 2020-10-10 | End: 2020-10-10 | Stop reason: HOSPADM

## 2020-10-10 RX ORDER — CARVEDILOL 12.5 MG/1
12.5 TABLET ORAL 2 TIMES DAILY WITH MEALS
Status: DISCONTINUED | OUTPATIENT
Start: 2020-10-10 | End: 2020-10-10 | Stop reason: HOSPADM

## 2020-10-10 RX ORDER — SEVELAMER CARBONATE 800 MG/1
800 TABLET, FILM COATED ORAL DAILY
Status: DISCONTINUED | OUTPATIENT
Start: 2020-10-10 | End: 2020-10-10 | Stop reason: HOSPADM

## 2020-10-10 RX ORDER — LOSARTAN POTASSIUM 50 MG/1
50 TABLET ORAL 2 TIMES DAILY
Status: DISCONTINUED | OUTPATIENT
Start: 2020-10-10 | End: 2020-10-10 | Stop reason: HOSPADM

## 2020-10-10 RX ORDER — ONDANSETRON 2 MG/ML
4 INJECTION INTRAMUSCULAR; INTRAVENOUS ONCE
Status: DISCONTINUED | OUTPATIENT
Start: 2020-10-10 | End: 2020-10-10

## 2020-10-10 RX ORDER — MIDODRINE HYDROCHLORIDE 2.5 MG/1
2.5 TABLET ORAL PRN
Status: DISCONTINUED | OUTPATIENT
Start: 2020-10-10 | End: 2020-10-10 | Stop reason: HOSPADM

## 2020-10-10 RX ORDER — VITAMIN B COMPLEX
1000 TABLET ORAL DAILY
Status: DISCONTINUED | OUTPATIENT
Start: 2020-10-10 | End: 2020-10-10 | Stop reason: HOSPADM

## 2020-10-10 RX ORDER — HEPARIN SODIUM 5000 [USP'U]/ML
5000 INJECTION, SOLUTION INTRAVENOUS; SUBCUTANEOUS EVERY 8 HOURS SCHEDULED
Status: DISCONTINUED | OUTPATIENT
Start: 2020-10-10 | End: 2020-10-10 | Stop reason: HOSPADM

## 2020-10-10 RX ORDER — TIMOLOL MALEATE 5 MG/ML
1 SOLUTION/ DROPS OPHTHALMIC 2 TIMES DAILY
Status: DISCONTINUED | OUTPATIENT
Start: 2020-10-10 | End: 2020-10-10 | Stop reason: HOSPADM

## 2020-10-10 RX ORDER — NICOTINE POLACRILEX 4 MG
15 LOZENGE BUCCAL PRN
Status: DISCONTINUED | OUTPATIENT
Start: 2020-10-10 | End: 2020-10-10 | Stop reason: HOSPADM

## 2020-10-10 RX ORDER — LACTULOSE 10 G/15ML
30 SOLUTION ORAL 3 TIMES DAILY
Status: DISCONTINUED | OUTPATIENT
Start: 2020-10-10 | End: 2020-10-10 | Stop reason: HOSPADM

## 2020-10-10 RX ADMIN — BRIMONIDINE TARTRATE 1 DROP: 2 SOLUTION OPHTHALMIC at 13:17

## 2020-10-10 RX ADMIN — FAMOTIDINE 20 MG: 20 TABLET ORAL at 13:17

## 2020-10-10 RX ADMIN — IPRATROPIUM BROMIDE AND ALBUTEROL SULFATE 3 ML: 2.5; .5 SOLUTION RESPIRATORY (INHALATION) at 14:16

## 2020-10-10 RX ADMIN — HEPARIN SODIUM 5000 UNITS: 5000 INJECTION INTRAVENOUS; SUBCUTANEOUS at 06:01

## 2020-10-10 RX ADMIN — DICYCLOMINE HYDROCHLORIDE 10 MG: 10 CAPSULE ORAL at 13:16

## 2020-10-10 RX ADMIN — DORZOLAMIDE HYDROCHLORIDE 1 DROP: 20 SOLUTION/ DROPS OPHTHALMIC at 13:17

## 2020-10-10 RX ADMIN — ASPIRIN 81 MG: 81 TABLET, COATED ORAL at 13:17

## 2020-10-10 RX ADMIN — SEVELAMER CARBONATE 800 MG: 800 TABLET, FILM COATED ORAL at 13:17

## 2020-10-10 RX ADMIN — MUPIROCIN: 20 OINTMENT TOPICAL at 13:16

## 2020-10-10 RX ADMIN — FUROSEMIDE 80 MG: 10 INJECTION, SOLUTION INTRAMUSCULAR; INTRAVENOUS at 03:59

## 2020-10-10 RX ADMIN — ACETAMINOPHEN 650 MG: 325 TABLET ORAL at 11:08

## 2020-10-10 RX ADMIN — IPRATROPIUM BROMIDE AND ALBUTEROL SULFATE: 2.5; .5 SOLUTION RESPIRATORY (INHALATION) at 08:23

## 2020-10-10 RX ADMIN — CARVEDILOL 12.5 MG: 12.5 TABLET, FILM COATED ORAL at 13:16

## 2020-10-10 RX ADMIN — Medication 1000 UNITS: at 13:17

## 2020-10-10 RX ADMIN — HEPARIN SODIUM 5000 UNITS: 5000 INJECTION INTRAVENOUS; SUBCUTANEOUS at 14:09

## 2020-10-10 RX ADMIN — IPRATROPIUM BROMIDE AND ALBUTEROL SULFATE 3 ML: 2.5; .5 SOLUTION RESPIRATORY (INHALATION) at 11:42

## 2020-10-10 RX ADMIN — TIMOLOL MALEATE 1 DROP: 5 SOLUTION/ DROPS OPHTHALMIC at 13:18

## 2020-10-10 RX ADMIN — LOSARTAN POTASSIUM 50 MG: 50 TABLET ORAL at 13:17

## 2020-10-10 RX ADMIN — HOMATROPINE HYDROBROMIDE OPHTHALMIC 1 DROP: 50 SOLUTION OPHTHALMIC at 13:18

## 2020-10-10 RX ADMIN — Medication 10 ML: at 07:48

## 2020-10-10 RX ADMIN — IPRATROPIUM BROMIDE AND ALBUTEROL SULFATE 3 ML: 2.5; .5 SOLUTION RESPIRATORY (INHALATION) at 18:16

## 2020-10-10 ASSESSMENT — ENCOUNTER SYMPTOMS
BACK PAIN: 0
ABDOMINAL PAIN: 0
DIARRHEA: 0
VOMITING: 0
SORE THROAT: 0
COUGH: 0
SHORTNESS OF BREATH: 0
NAUSEA: 0
RHINORRHEA: 0
CHEST TIGHTNESS: 0

## 2020-10-10 ASSESSMENT — PAIN DESCRIPTION - LOCATION: LOCATION: LEG;HIP

## 2020-10-10 ASSESSMENT — PAIN SCALES - GENERAL
PAINLEVEL_OUTOF10: 10
PAINLEVEL_OUTOF10: 10
PAINLEVEL_OUTOF10: 5

## 2020-10-10 ASSESSMENT — PAIN DESCRIPTION - ORIENTATION: ORIENTATION: RIGHT;LEFT

## 2020-10-10 NOTE — H&P
Patient Information:  Patient: Blayne Rivas  MRN: [id number]   Acct: [de-identified]  YOB: 1960  Admit Date: 10/10/2020      Date of Service: 10/10/2020  Primary Care Physician: RAMONA Saha  Advance Directive: Full Code         SUBJECTIVE:    Chief Complaint   Patient presents with    Leg Swelling     bilateral x month    Hip Pain     my hips are burning     EP Sign Out:  Skipped HD    HPI:  Mrs. Blayne Rivas is a pleasant 56year old lady from home. She states that she had skipped HD as she does not like I, she states \"it is torture\". She was asked if she did not want to go to HD is she would be interested in hospice care. She enquired what this was and then states that she is not ready for that. She has requested to have HD in the AM.     Review of Systems:   Review of Systems   Constitutional: Positive for fatigue. Negative for chills, diaphoresis and fever. No malaise   Respiratory: Negative for cough, chest tightness and shortness of breath. Cardiovascular: Negative for chest pain. Gastrointestinal: Negative for abdominal pain, diarrhea and nausea. Neurological: Positive for weakness. Negative for light-headedness.    Psychiatric/Behavioral: Negative for confusion.     (following subjective sections as per chart review)    Past Medical History:   Diagnosis Date    Blind right eye     partial vision loss in the L eye too, due to DM    Blindness of one eye     Right     CHF (congestive heart failure) (HCC)     CKD (chronic kidney disease), stage IV (HCC)     secondary to diabetic nephropathy    Diabetes ([facility] [address]     Diabetes mellitus with ophthalmic manifestations     dx'd in 2000 but likely ongoing before that, was dx'd when she went blind in the R eye    Glaucoma     Hemodialysis patient ([facility] [address]     [facility] tues, thurs, [facility]    Hypertension     2000, dx'd at same time as the DM    Obesity     Renal osteodystrophy     Smoker     Type II or unspecified type diabetes mellitus with renal manifestations, uncontrolled(250.42)      Past Surgical History:   Procedure Laterality Date    CARPAL TUNNEL RELEASE      CATARACT REMOVAL       SECTION      , , and     CHOLECYSTECTOMY      COLONOSCOPY N/A 3/9/2018    Dr ReevesLulaqr-Ulffiquaqrelby-Kmcwibekccfru AP (-) dysplasia x 1, tubular AP x  (-) dysplasia x 1, HP x 4--1 yr recall    DIALYSIS FISTULA CREATION Left 4/23/15 [facility]    Left proximal ulnar artery to cephalic vein AVF creation    EYE SURGERY      laser, several times     TYMPANOSTOMY TUBE PLACEMENT Bilateral     VASCULAR SURGERY Left 5/11/15 [facility] [place]    US-guided cannulation left distal upper arm cephalic vein with 4-Guamanian glide sheath; LUE AV f'grams with venography SVC; left cephalic vein BAM with [id number] jluieth balloon; [place] [facility]    VASCULAR SURGERY Left 5/28/15 [facility] [place]    Percutaneous cannulation left distal radial artery with 4-Gu[place]ian glide sheath; LUE AV f'grams with venography SVC; left cephalic vein balloon a'plasty with [id number] julieth balloon and [id number] julieth balloon; proximal and mid upper arm cephalic vein BAM with [id number] julieth balloon; [place] [facility] [place]    VASCULAR SURGERY Left 6/15/15 [facility] [place]    US-guided cannulation left cephalic vein with 4-Guamanian and later 7-Guamanian sheath; LUE AV f'grams including venography SVC; left cephalic vein stenosis balloon a'plasty with [id number] cutting balloon; completion f'gram and venogram [facility] [place]    VASCULAR SURGERY  05/15/2017    [facility] [place] Left upper fistulograms/venograms.  VASCULAR SURGERY  2018    S. Left upper fistulograms, left subclavian BA 12x40 atlas, left cephalic arch BA 03R74 conquest.    VASCULAR SURGERY  2019    S. Left upper fistulograms,BA left SCV 8x40 conquest,stent left SCV, viabahn 13x50,left SCV stent BA 12x40 conquest.     Social History     Tobacco History     Smoking Status  Current Every Day Smoker Smoking Frequency  1 albuterol (PROVENTIL) nebulizer solution 2.5 mg  2.5 mg Nebulization Q4H PRN Christel Form, MD        Vitamin D3 CAPS 1 capsule  1 capsule Oral Daily Christel Form, MD        insulin glargine (LANTUS;BASAGLAR) injection pen 10 Units  10 Units Subcutaneous QAM Christel Form, MD        homatropine 5 % ophthalmic solution 1 drop  1 drop Left Eye Daily Christel Form, MD        fluticasone [facility]) 50 MCG/ACT nasal spray 2 spray  2 spray Nasal Daily Christel Form, MD        famotidine (PEPCID) tablet 20 mg  1 tablet Oral BID Christel Form, MD        dorzolamide (TRUSOPT) 2 % ophthalmic solution 1 drop  1 drop Left Eye BID Christel Form, MD        dicyclomine (BENTYL) capsule 10 mg  10 mg Oral 4x Daily Christel Form, MD        ipratropium-albuterol (DUONEB) nebulizer solution 3 mL  1 vial Inhalation Q4H Christel Form, MD        lactulose (CHRONULAC) 10 GM/15ML solution 30 g  30 g Oral TID Christel Form, MD        losartan (COZAAR) tablet 50 mg  1 tablet Oral BID Christel Form, MD        midodrine (PROAMATINE) tablet 2.5 mg  2.5 mg Oral PRN Christel Form, MD        mupirocin (BACTROBAN) 2 % ointment   Nasal BID Christel Form, MD         Current Outpatient Medications   Medication Sig Dispense Refill    mupirocin (BACTROBAN) 2 % ointment Apply to each nare bid x 5 days 3 g 0    losartan (COZAAR) 50 MG tablet TAKE 1 TABLET BY MOUTH TWICE DAILY 180 tablet 0    famotidine (PEPCID) 20 MG tablet TAKE 1 TABLET BY MOUTH TWICE DAILY 60 tablet 11    insulin glargine (LANTUS SOLOSTAR) 100 UNIT/ML injection pen INJECT 10 UNITS INTO THE SKIN ONCE DAILY 15 mL 1    pravastatin (PRAVACHOL) 10 MG tablet TAKE 1 TABLET BY MOUTH EVERY NIGHT AT BEDTIME 30 tablet 11    ipratropium-albuterol (DUONEB) 0.5-2.5 (3) MG/3ML SOLN nebulizer solution Inhale 3 mLs into the lungs every 4 hours 360 mL 1    dicyclomine (BENTYL) 10 MG capsule Take 1 capsule by mouth 4 times daily 120 capsule 5    carvedilol (COREG) 12.5 MG tablet Take 12.5 mg by mouth 2 times daily (with meals)      lactulose (CHRONULAC) 10 GM/15ML solution TAKE 30 ML BY MOUTH THREE TIMES DAILY 8108 mL 1    Cholecalciferol (VITAMIN D3) 1000 units CAPS Take by mouth      Travoprost, BAK Free, (TRAVATAN Z) 0.004 % SOLN ophthalmic solution 1 drop nightly      midodrine (PROAMATINE) 5 MG tablet TK 1 T PO HALF WAY THROUGH TREATMENT AS DIRECTED      sevelamer (RENVELA) 800 MG tablet Take 1 tablet by mouth daily       aspirin EC 81 MG EC tablet Take 81 mg by mouth daily      fluticasone (FLONASE) 50 MCG/ACT nasal spray 2 sprays by Nasal route daily. To keep ears opened up 1 Bottle 11    bimatoprost 0.01 % SOLN Apply 1 drop to eye nightly. One drop in left eye at hs      timolol (TIMOPTIC-XR) 0.5 % ophthalmic gel-forming Place 1 drop into the left eye 2 times daily       dorzolamide (TRUSOPT) 2 % ophthalmic solution Place 1 drop into the left eye 2 times daily       brimonidine (ALPHAGAN P) 0.15 % ophthalmic solution Place 1 drop into the left eye 3 times daily       homatropine 5 % ophthalmic solution Place 1 drop into the left eye daily       ondansetron (ZOFRAN) 4 MG tablet Take 4 mg by mouth every 6 hours      albuterol sulfate HFA (PROVENTIL HFA) 108 (90 Base) MCG/ACT inhaler Inhale 2 puffs into the lungs every 6 hours as needed for Wheezing 1 Inhaler 3    B-D ULTRAFINE III SHORT PEN 31G X 8 MM MISC USE ONCE DAILY TO INJECT INSULIN DAILY 100 each 3    blood glucose monitor kit and supplies 1 kit by Other route 3 times daily Test three times a day & as needed for symptoms of irregular blood glucose. 1 kit 0    Blood Glucose Monitoring Suppl NIKOS by Does not apply route. Pt will also need new lancet device if not included in kit. 1 Device 0    Glucose Blood (BLOOD GLUCOSE TEST STRIPS) STRP Check blood sugar twice a day for recent medication adjustments.  100 strip 11    Insulin Syringe-Needle U-100 (ACCUSURE INS SYR 1CC/31GX5/16\") 31G X 5/16\" 1 ML MISC by Does not apply route. 100 each 3         OBJECTIVE:    Vitals:    10/10/20 0206   BP: (!) 144/67   Pulse: 74   Resp: 18   Temp: 98 °F (36.7 °C)   SpO2: 92%     BP (!) 144/67   Pulse 74   Temp 98 °F (36.7 °C)   Resp 18   Ht 4' 11\" (1.499 m)   Wt 163 lb (73.9 kg)   SpO2 92%   BMI 32.92 kg/m²     No intake or output data in the 24 hours ending 10/10/20 4916    Physical Exam  Constitutional:       General: She is not in acute distress. Appearance: Normal appearance. She is obese. She is not ill-appearing or toxic-appearing. HENT:      Head: Normocephalic and atraumatic. Nose: No congestion or rhinorrhea. Eyes:      General:         Right eye: No discharge. Left eye: No discharge. Neck:      Musculoskeletal: Neck supple. Comments: Trachea appears midline  Cardiovascular:      Rate and Rhythm: Normal rate and regular rhythm. Heart sounds: No murmur. No friction rub. No gallop. Pulmonary:      Effort: Pulmonary effort is normal. No respiratory distress. Breath sounds: No stridor. No wheezing, rhonchi or rales. Chest:      Chest wall: No tenderness. Abdominal:      General: Bowel sounds are normal.      Tenderness: There is no abdominal tenderness. There is no guarding or rebound. Comments: Has abdokminal obesity   Skin:     General: Skin is warm. Comments: nondiaphoretic   Neurological:      Mental Status: She is alert. Motor: No tremor. Psychiatric:         Attention and Perception: Attention normal.         Mood and Affect: Affect is inappropriate.          Behavior: Behavior normal.       LABORATORY DATA:    CBC:   Recent Labs     10/10/20  0212   WBC 4.9   HGB 11.4*   HCT 37.7        BMP:   Recent Labs     10/10/20  0212      K 4.3   CL 95*   CO2 26   BUN 65*   CREATININE 7.2*   CALCIUM 9.3     Hepatic Profile:   Recent Labs     10/10/20  0212   AST 21   ALT 9   BILITOT 0.5   ALKPHOS 298*     Coag Panel: No results for input(s): INR, PROTIME, APTT in the last 72 hours. Cardiac Enzymes:   Recent Labs     10/10/20  0212   TROPONINI 0.12*     Pro-BNP:   Recent Labs     10/10/20  0212   PROBNP 35,000*     A1C: No results for input(s): LABA1C in the last 72 hours. TSH: Invalid input(s): LABTSH    ABG: No results for input(s): PHART, MDB9DAC, PO2ART, ILZ7YSC, BEART, HGBAE, Q4QPYCEP, CARBOXHGBART in the last 72 hours. Urinalysis:   Lab Results   Component Value Date    NITRU Negative 09/01/2020    WBCUA 3-5 09/01/2020    BACTERIA None Seen 09/01/2020    RBCUA 3-5 09/01/2020    BLOODU SMALL 09/01/2020    SPECGRAV 1.013 09/01/2020    GLUCOSEU 250 09/01/2020       IMAGING:  No results found. ASESSMENTS & PLANS:    ESRD on HD, missed HD with Fluid Volume Overload:  \"admit\" to Observation under hospitlaist team  Nephro consult for HD in AM      Chronic Medical Problems:  cotninue home regimen   furosemide  80 mg Intravenous Once    timolol  1 drop Left Eye BID    sevelamer  800 mg Oral Daily    pravastatin  10 mg Oral Nightly    carvedilol  12.5 mg Oral BID WC    brimonidine  1 drop Left Eye TID    latanoprost  1 drop Left Eye Nightly    aspirin EC  81 mg Oral Daily    Vitamin D3  1 capsule Oral Daily    insulin glargine  10 Units Subcutaneous QAM    homatropine  1 drop Left Eye Daily    fluticasone  2 spray Nasal Daily    famotidine  1 tablet Oral BID    dorzolamide  1 drop Left Eye BID    dicyclomine  10 mg Oral 4x Daily    ipratropium-albuterol  1 vial Inhalation Q4H    lactulose  30 g Oral TID    losartan  1 tablet Oral BID    mupirocin   Nasal BID       Supoportive and Prophylactic Txx:  DVT Prophylaxis: heparin SQ  GI (PUD) Ppx: not indicated  PT consult for evalutation and Txx as indicated: not indictaed  No diet orders on file - renal diet  albuterol, midodrine    Care time of >35 minutes  Pt seen/examined and admitted to OBServation status.   Inpatient status is used for patients with an expected LOS extending past two midnights due to medical therapy and or critical care needs, otherwise patients are placed to OBServation status. Signed:  Electronically signed by Sylvia Arias MD on 10/10/20 at  3:57 AM CDT.

## 2020-10-10 NOTE — ED PROVIDER NOTES
[facility]  eMERGENCY dEPARTMENT eNCOUnter      Pt Name: Chapis Denton  MRN: [id number]  [place] 1960  Date of evaluation: 10/10/2020  Provider: Shelia Dumont MD    [address] [place] [address]       Chief Complaint   Patient presents with    Leg Swelling     bilateral x month    Hip Pain     my hips are burning         HISTORY OF PRESENT ILLNESS   (Location/Symptom, Timing/Onset,Context/Setting, Quality, Duration, Modifying Factors, Severity)  Note limiting factors. Chapis Denton is a 56 y.o. female who presents to the emergency department for bilateral hip pain described as a burning type discomfort worse on the right side. No recent falls and tells me her last falls approximately 1 month ago possibly landed on her right side that not completely sure. She has been using a wheelchair but can get by with a walker she tells me. She ultimately called ambulance tonight due to her pain in her hips. She is a Tuesday Thursday Saturday dialysis patient and last dialyzed on Tuesday missed her session yesterday unfortunately. She states her legs have been having increased swelling over the past 3 weeks. She currently denies any chest pain and breathing is overall stable she tells me. HPI    NursingNotes were reviewed. REVIEW OF SYSTEMS    (2-9 systems for level 4, 10 or more for level 5)     Review of Systems   Constitutional: Positive for fatigue. Negative for chills and fever. HENT: Negative for rhinorrhea and sore throat. Respiratory: Negative for cough and shortness of breath. Cardiovascular: Positive for leg swelling. Negative for chest pain. Gastrointestinal: Negative for abdominal pain, diarrhea, nausea and vomiting. Genitourinary: Negative for dysuria, frequency and urgency. Musculoskeletal: Positive for gait problem. Negative for back pain and neck pain. Neurological: Negative for dizziness and headaches. All other systems reviewed and are negative.            PAST MEDICALHISTORY     Past Medical History:   Diagnosis Date    Blind right eye     partial vision loss in the L eye too, due to DM    Blindness of one eye     Right     CHF (congestive heart failure) (HCC)     CKD (chronic kidney disease), stage IV (HCC)     secondary to diabetic nephropathy    Diabetes ([facility] [address]     Diabetes mellitus with ophthalmic manifestations     dx'd in 2000 but likely ongoing before that, was dx'd when she went blind in the R eye    Glaucoma     Hemodialysis patient ([facility] [address]     dialysis tues, thurs, sat. [facility]    Hypertension     2000, dx'd at same time as the DM    Obesity     Renal osteodystrophy     Smoker     Type II or unspecified type diabetes mellitus with renal manifestations, uncontrolled(250.42)          SURGICAL HISTORY       Past Surgical History:   Procedure Laterality Date    CARPAL TUNNEL RELEASE      CATARACT REMOVAL     [address], and 1979    CHOLECYSTECTOMY      COLONOSCOPY N/A 3/9/2018    Dr ReevesXketpz-Dfueimymkgmimd-Urhqskskxykfq AP (-) dysplasia x 1, tubular AP x  (-) dysplasia x 1, HP x 4--1 yr recall    DIALYSIS FISTULA CREATION Left 4/23/15 [facility]    Left proximal ulnar artery to cephalic vein AVF creation    EYE SURGERY      laser, several times     TYMPANOSTOMY TUBE PLACEMENT Bilateral     VASCULAR SURGERY Left 5/11/15 [facility]    US-guided cannulation left distal upper arm cephalic vein with 4-Malay glide sheath; LUE AV f'grams with venography SVC; left cephalic vein BAM with [id number] julieth balloon; completion venogram E    VASCULAR SURGERY Left 5/28/15 S    Percutaneous cannulation left distal radial artery with 4-Malay glide sheath; LUE AV f'grams with venography SVC; left cephalic vein balloon a'plasty with [id number] julieth balloon and [id number] julieth balloon; proximal and mid upper arm cephalic vein BAM with [id number] julieth balloon; completion venogram LUE    VASCULAR SURGERY Left 6/15/15 S US-guided cannulation left cephalic vein with 4-french and later 7-french sheath; LUE AV f'grams including venography SVC; left cephalic vein stenosis balloon a'plasty with [id number] cutting balloon; completion f'gram and venogram SANDYE    VASCULAR SURGERY  05/15/2017    [facility]. Left upper fistulograms/venograms.  VASCULAR SURGERY  02/14/2018    [facility]. Left upper fistulograms, left subclavian BA 12x40 atlas, left cephalic arch BA [id number] conquest.    VASCULAR SURGERY  02/13/2019    [facility]. Left upper fistulograms,BA left SCV 8x40 conquest,stent left SCV, viabahn 13x50,left SCV stent BA 12x40 conquest.         CURRENT MEDICATIONS     Previous Medications    ALBUTEROL SULFATE HFA (PROVENTIL HFA) 108 (90 BASE) MCG/ACT INHALER    Inhale 2 puffs into the lungs every 6 hours as needed for Wheezing    ASPIRIN EC 81 MG EC TABLET    Take 81 mg by mouth daily    B-D ULTRAFINE III SHORT PEN 31G X 8 MM MISC    USE ONCE DAILY TO INJECT INSULIN DAILY    BIMATOPROST 0.01 % SOLN    Apply 1 drop to eye nightly. One drop in left eye at     BLOOD GLUCOSE MONITOR KIT AND SUPPLIES    1 kit by Other route 3 times daily Test three times a day & as needed for symptoms of irregular blood glucose. BLOOD GLUCOSE MONITORING SUPPL NIKOS    by Does not apply route. Pt will also need new lancet device if not included in kit. BRIMONIDINE (ALPHAGAN P) 0.15 % OPHTHALMIC SOLUTION    Place 1 drop into the left eye 3 times daily     CARVEDILOL (COREG) 12.5 MG TABLET    Take 12.5 mg by mouth 2 times daily (with meals)    CHOLECALCIFEROL (VITAMIN D3) 1000 UNITS CAPS    Take by mouth    DICYCLOMINE (BENTYL) 10 MG CAPSULE    Take 1 capsule by mouth 4 times daily    DORZOLAMIDE (TRUSOPT) 2 % OPHTHALMIC SOLUTION    Place 1 drop into the left eye 2 times daily     FAMOTIDINE (PEPCID) 20 MG TABLET    TAKE 1 TABLET BY MOUTH TWICE DAILY    FLUTICASONE (FLONASE) 50 MCG/ACT NASAL SPRAY    2 sprays by Nasal route daily.  To keep ears opened up    GLUCOSE BLOOD (BLOOD GLUCOSE TEST STRIPS) STRP    Check blood sugar twice a day for recent medication adjustments. HOMATROPINE 5 % OPHTHALMIC SOLUTION    Place 1 drop into the left eye daily     INSULIN GLARGINE (LANTUS SOLOSTAR) 100 UNIT/ML INJECTION PEN    INJECT 10 UNITS INTO THE SKIN ONCE DAILY    INSULIN SYRINGE-NEEDLE U-100 (ACCUSURE INS SYR 1CC/31GX5/16\") 31G X 5/16\" 1 ML MISC    by Does not apply route. IPRATROPIUM-ALBUTEROL (DUONEB) 0.5-2.5 (3) MG/3ML SOLN NEBULIZER SOLUTION    Inhale 3 mLs into the lungs every 4 hours    LACTULOSE (CHRONULAC) 10 GM/15ML SOLUTION    TAKE 30 ML BY MOUTH THREE TIMES DAILY    LOSARTAN (COZAAR) 50 MG TABLET    TAKE 1 TABLET BY MOUTH TWICE DAILY    MIDODRINE (PROAMATINE) 5 MG TABLET    TK 1 T PO HALF WAY THROUGH TREATMENT AS DIRECTED    MUPIROCIN (BACTROBAN) 2 % OINTMENT    Apply to each nare bid x 5 days    ONDANSETRON (ZOFRAN) 4 MG TABLET    Take 4 mg by mouth every 6 hours    PRAVASTATIN (PRAVACHOL) 10 MG TABLET    TAKE 1 TABLET BY MOUTH EVERY NIGHT AT BEDTIME    SEVELAMER (RENVELA) 800 MG TABLET    Take 1 tablet by mouth daily     TIMOLOL (TIMOPTIC-XR) 0.5 % OPHTHALMIC GEL-FORMING    Place 1 drop into the left eye 2 times daily     TRAVOPROST, ALLISON FREE, (TRAVATAN Z) 0.004 % SOLN OPHTHALMIC SOLUTION    1 drop nightly       ALLERGIES     Bupropion; Sulfa antibiotics; Varenicline; Wellbutrin [bupropion hcl]; Azithromycin;  Levaquin [levofloxacin]; and Penicillins    FAMILY HISTORY       Family History   Problem Relation Age of Onset    Heart Disease Mother     Heart Attack Mother     Glaucoma Sister     Diabetes Sister     Diabetes Brother     Kidney Disease Brother     Blindness Brother     Stroke Maternal Grandmother     Stroke Maternal Grandfather     Colon Cancer Neg Hx     Colon Polyps Neg Hx     Liver Cancer Neg Hx     Liver Disease Neg Hx     Esophageal Cancer Neg Hx     Rectal Cancer Neg Hx     Stomach Cancer Neg Hx           SOCIAL HISTORY       Social History Socioeconomic History    Marital status:      Spouse name: Not on file    Number of children: Not on file    Years of education: Not on file    Highest education level: Not on file   Occupational History    Not on file   Social Needs    Financial resource strain: Not on file    Food insecurity     Worry: Not on file     Inability: Not on file    Transportation needs     Medical: Not on file     Non-medical: Not on file   Tobacco Use    Smoking status: Current Every Day Smoker     Packs/day: 1.00     Years: 40.00     Pack years: 40.00    Smokeless tobacco: Never Used   Substance and Sexual Activity    Alcohol use: No     Alcohol/week: 0.0 standard drinks    Drug use: Yes     Types: Marijuana    Sexual activity: Not Currently     Partners: Male   Lifestyle    Physical activity     Days per week: Not on file     Minutes per session: Not on file    Stress: Not on file   Relationships    Social connections     Talks on phone: Not on file     Gets together: Not on file     Attends Denominational service: Not on file     Active member of club or organization: Not on file     Attends meetings of clubs or organizations: Not on file     Relationship status: Not on file    Intimate partner violence     Fear of current or ex partner: Not on file     Emotionally abused: Not on file     Physically abused: Not on file     Forced sexual activity: Not on file   Other Topics Concern    Not on file   Social History Narrative    Not on file       SCREENINGS             PHYSICAL EXAM    (up to 7 for level 4, 8 or more for level 5)     ED Triage Vitals [10/10/20 0206]   BP Temp Temp src Pulse Resp SpO2 Height Weight   (!) 144/67 98 °F (36.7 °C) -- 74 18 92 % 4' 11\" (1.499 m) 163 lb (73.9 kg)       Physical Exam  Vitals signs and nursing note reviewed. Constitutional:       General: She is not in acute distress. Appearance: Normal appearance. She is well-developed. She is ill-appearing.  She is not toxic-appearing or diaphoretic. HENT:      Head: Normocephalic and atraumatic. Right Ear: External ear normal.      Left Ear: External ear normal.      Nose: Nose normal.      Mouth/Throat:      Mouth: Mucous membranes are moist.   Eyes:      Conjunctiva/sclera: Conjunctivae normal.   Neck:      Musculoskeletal: Normal range of motion. Trachea: No tracheal deviation. Cardiovascular:      Rate and Rhythm: Normal rate and regular rhythm. Heart sounds: Murmur present. Systolic murmur present with a grade of 3/6. Pulmonary:      Effort: No respiratory distress. Breath sounds: Examination of the right-lower field reveals decreased breath sounds. Examination of the left-lower field reveals decreased breath sounds. Decreased breath sounds present. No wheezing or rales. Abdominal:      Palpations: Abdomen is soft. There is no mass. Tenderness: There is no abdominal tenderness. Musculoskeletal:      Right hip: She exhibits bony tenderness. She exhibits no deformity. Left hip: She exhibits bony tenderness. She exhibits no deformity. Cervical back: Normal.      Thoracic back: Normal.      Lumbar back: Normal.      Right lower leg: Edema present. Left lower leg: Edema present. Comments: Able to lift both legs off bed, no deformities or rotation, dec ROM due to pain    Normal rom of b/l upper extrem    B/l 2+ pitting edema   Skin:     General: Skin is warm and dry. Neurological:      Mental Status: She is alert and oriented to person, place, and time. GCS: GCS eye subscore is 4. GCS verbal subscore is 5. GCS motor subscore is 6.          DIAGNOSTIC RESULTS     EKG: All EKG's areinterpreted by the Emergency Department Physician who either signs or Co-signs this chart in the absence of a cardiologist.    74 normal sinus rhythm, prolonged QTC nondiagnostic EKG    RADIOLOGY:  Non-plain film images such as CT, Ultrasound and MRI are read by the radiologist. [place] radiographic images are visualized and preliminarily interpreted bythe emergency physician with the below findings:        XR HIP BILATERAL W AP PELVIS (2 VIEWS)    (Results Pending)   XR CHEST PORTABLE    (Results Pending)   XR CHEST (2 VW)    (Results Pending)           LABS:  Labs Reviewed   COMPREHENSIVE METABOLIC PANEL - Abnormal; Notable for the following components:       Result Value    Chloride 95 (*)     Glucose 164 (*)     BUN 65 (*)     CREATININE 7.2 (*)     GFR Non- 6 (*)     GFR African American 7 (*)     Alkaline Phosphatase 298 (*)     All other components within normal limits   CBC WITH AUTO DIFFERENTIAL - Abnormal; Notable for the following components:    RBC 4.10 (*)     Hemoglobin 11.4 (*)     MCHC 30.2 (*)     RDW 17.7 (*)     Neutrophils % 69.5 (*)     Lymphocytes % 17.0 (*)     Monocytes % 10.7 (*)     Lymphocytes Absolute 0.8 (*)     All other components within normal limits   BRAIN NATRIURETIC PEPTIDE - Abnormal; Notable for the following components:    Pro-BNP 35,000 (*)     All other components within normal limits   TROPONIN - Abnormal; Notable for the following components:    Troponin 0.12 (*)     All other components within normal limits    Narrative:     CALL  Heck  [facility] tel. ,  Chemistry results called to and read back by Shantel Nix rn/er,  10/10/2020 03:01, by MELODY       All other labs were within normal range or not returned as of this dictation. EMERGENCY DEPARTMENT COURSE and DIFFERENTIAL DIAGNOSIS/MDM:   Vitals:    Vitals:    10/10/20 0206 10/10/20 0207 10/10/20 0344   BP: (!) 144/67 (!) 144/67    Pulse: 74  75   Resp: 18  14   Temp: 98 °F (36.7 °C)     SpO2: 92% 91% 96%   Weight: 163 lb (73.9 kg)     Height: 4' 11\" (1.499 m)         MDM  Number of Diagnoses or Management Options  Bilateral hip pain:   ESRD on dialysis [facility]):    Hypervolemia, unspecified hypervolemia type:      Amount and/or Complexity of Data Reviewed  Clinical lab tests: ordered and reviewed  Tests in the radiology section of CPT®: ordered and reviewed  Discuss the patient with other providers: yes  Independent visualization of images, tracings, or specimens: yes      VSS, pt appears volume overloaded clinically, cxr somewhat congested with effusion but not hypoxic, ultimately needs dialysis, missed Thursday session, trop noted likely due to volume status, b/l hip pain appears more arthritis related, having some gait issues due to pain, d/w Dr. Rhett Rao for admission      CONSULTS:  None    PROCEDURES:  Unless otherwise noted below, none     Procedures    FINAL IMPRESSION      1. ESRD on dialysis ([facility] [address]    2. Pre-op testing    3. Hypervolemia, unspecified hypervolemia type    4. Bilateral hip pain          DISPOSITION/PLAN   DISPOSITION Admitted 10/10/2020 03:28:38 AM      PATIENT REFERRED TO:  No follow-up provider specified.     DISCHARGE MEDICATIONS:  New Prescriptions    No medications on file          (Please note that portions of this note were completed with a voice recognition program.  Efforts were made to edit thedictations but occasionally words are mis-transcribed.)    Zay Villa MD (electronically signed)  Attending Emergency Physician        Madan Jc MD  10/10/20 [address], MD  10/10/20 4809

## 2020-10-10 NOTE — ED NOTES
Patient placed in a gown Patient placed on cardiac monitor, continuous pulse oximeter, and NIBP monitor.  Monitor alarms on.       [employer], RN  10/10/20 8975

## 2020-10-10 NOTE — PROGRESS NOTES
4 Eyes Skin Assessment    Lindsey Contreras is being assessed upon: Admission    I agree that I, Charisse Pugh, along with Gaurav Worrell RN (either 2 RN's or 1 LPN and 1 RN) have performed a thorough Head to Toe Skin Assessment on the patient. ALL assessment sites listed below have been assessed. Areas assessed by both nurses:     [x]   Head, Face, and Ears   [x]   Shoulders, Back, and Chest  [x]   Arms, Elbows, and Hands   [x]   Coccyx, Sacrum, and Ischium  [x]   Legs, Feet, and Heels    Does the Patient have Skin Breakdown?  No    Jake Prevention initiated: Yes  Wound Care Orders initiated: No    [facility] nurse consulted for Pressure Injury (Stage 3,4, Unstageable, DTI, NWPT, and Complex wounds) and New or Established Ostomies: No        Primary Nurse eSignature: Charisse Pugh RN on 10/10/2020 at 4:47 AM      Co-Signer eSignature: Electronically signed by Gaurav Worrell RN on 10/10/20 at 5:09 AM CDT

## 2020-10-10 NOTE — PROGRESS NOTES
Juwan Clements arrived to room # 65   Presented with: fluid overload   Mental Status: Patient is oriented and alert. Vitals:    10/10/20 0414   BP:    Pulse: 73   Resp: 18   Temp: 96.6 °F (35.9 °C)   SpO2: 90%     Patient safety contract and falls prevention contract reviewed with patient Yes. Oriented Patient to room. Call light within reach. Yes.   Needs, issues or concerns expressed at this time:, no      Electronically signed by Rigoberto Land RN on 10/10/2020 at 4:45 AM

## 2020-10-10 NOTE — DISCHARGE SUMMARY
Discharge Summary  Date:10/10/2020        Patient Verito Sunshine     YOB: 1960     Age:60 y.o. Admit Date:10/10/2020   Admission Condition:poor   Discharged Condition:fair  Discharge Date: 10/10/20     Discharge Diagnoses   Active Problems:    Volume overload  Resolved Problems:    * No resolved hospital problems. [facility] Stay   Narrative of Hospital Course: Patient was admitted 10/10 HPI per nocturnist physician\" Mrs. Mon Smoker is a pleasant 56year old lady from home. She states that she had skipped HD as she does not like I, she states \"it is torture\". She was asked if she did not want to go to HD is she would be interested in hospice care. She enquired what this was and then states that she is not ready for that. \"  Upon admission labs were reviewed noted creatinine 7.2, GFR 6, proBNP elevation 35,000, troponin elevation 0.12/0. 13. Patient was dialyzed, with nephrology consultation, tolerated well with strong instructions to maintain compliance with further dialysis needs. .  Patient complains of bilateral hip pain no abnormality seen on x-ray imaging modality. Patient cleared for discharge 10/10, follow with primary care provider in 1 to 2 weeks for hospital discharge well as ongoing coordination of care. Education modalities of infection at visit summary. Consultants:   IP CONSULT TO NEPHROLOGY    Time Spent on Discharge:  45 minutes were spent in patient examination, evaluation, counseling as well as medication reconciliation, prescriptions for required medications, discharge plan and follow up.       Surgeries/Procedures Performed:       Treatments:   analgesia: acetaminophen, cardiac meds: Carvedilol, Lasix, losartan, midodrine, pravastatin, insulin: Humalog and Lantus, respiratory therapy: O2, dialysis: Hemodialysis and electrolyte stabilization    Significant Diagnostic Studies:   Recent Labs:  CBC:   Lab Results   Component Value Date    WBC 4.9 10/10/2020 RBC 4.10 10/10/2020    HGB 11.4 10/10/2020    HCT 37.7 10/10/2020    MCV 92.0 10/10/2020    MCH 27.8 10/10/2020    MCHC 30.2 10/10/2020    RDW 17.7 10/10/2020     10/10/2020     BMP:    Lab Results   Component Value Date    GLUCOSE 164 10/10/2020     10/10/2020    K 4.3 10/10/2020    K 4.4 09/03/2020    CL 95 10/10/2020    CO2 26 10/10/2020    ANIONGAP 19 10/10/2020    BUN 65 10/10/2020    CREATININE 7.2 10/10/2020    CALCIUM 9.3 10/10/2020    LABGLOM 6 10/10/2020    GFRAA 7 10/10/2020     HFP:    Lab Results   Component Value Date    PROT 6.6 10/10/2020    PROT 6.7 11/13/2012     CMP:    Lab Results   Component Value Date    GLUCOSE 164 10/10/2020     10/10/2020    K 4.3 10/10/2020    K 4.4 09/03/2020    CL 95 10/10/2020    CO2 26 10/10/2020    BUN 65 10/10/2020    CREATININE 7.2 10/10/2020    ANIONGAP 19 10/10/2020    ALKPHOS 298 10/10/2020    ALT 9 10/10/2020    AST 21 10/10/2020    BILITOT 0.5 10/10/2020    LABALBU 3.7 10/10/2020    LABGLOM 6 10/10/2020    GFRAA 7 10/10/2020    PROT 6.6 10/10/2020    PROT 6.7 11/13/2012    CALCIUM 9.3 10/10/2020     PT/INR:    Lab Results   Component Value Date    PROTIME 14.9 09/01/2020    INR 1.17 09/01/2020     PTT: No results found for: APTT  FLP:    Lab Results   Component Value Date    CHOL 112 06/09/2020    TRIG 131 06/09/2020    HDL 61 06/09/2020     U/A:    Lab Results   Component Value Date    COLORU DK YELLOW 09/01/2020    SPECGRAV 1.013 09/01/2020    PHUR 7.5 09/01/2020    PROTEINU >=1000 09/01/2020    GLUCOSEU 250 09/01/2020    KETUA Negative 09/01/2020    BILIRUBINUR Negative 09/01/2020    UROBILINOGEN 0.2 09/01/2020    NITRU Negative 09/01/2020    LEUKOCYTESUR Negative 09/01/2020     TSH:  No results found for: TSH    Radiology Last 7 Days:  Xr Hip Bilateral W Ap Pelvis (2 Views)    Result Date: 10/10/2020  No acute bony finding. Agree with Marlee Childress \"no fracture or dislocation. \" Signed by Dr Sandra Chao on 10/10/2020 6:58 AM    Xr Chest Portable    Result Date: 10/10/2020  1. Diffuse interstitial opacities suggest pulmonary edema. 2. Similar chronic right basilar opacity and effusion. Agree with Gutierrez Muster \"interstitial prominence, question overload, right pleural effusion. \" Signed by Dr Yolanda Persaud on 10/10/2020 7:00 AM    Physical Exam  Constitutional:       Appearance: She is obese. She is not toxic-appearing or diaphoretic. HENT:      Head: Normocephalic and atraumatic. Nose: Nose normal.   Eyes:      General:         Right eye: No discharge. Left eye: No discharge. Conjunctiva/sclera: Conjunctivae normal.   Neck:      Musculoskeletal: Normal range of motion. Cardiovascular:      Rate and Rhythm: Normal rate and regular rhythm. Pulmonary:      Effort: Pulmonary effort is normal.      Breath sounds: No wheezing. Abdominal:      Tenderness: There is no abdominal tenderness. There is no guarding or rebound. Musculoskeletal: Normal range of motion. Right lower leg: No edema. Left lower leg: No edema. Skin:     General: Skin is warm. Capillary Refill: Capillary refill takes less than 2 seconds. Neurological:      General: No focal deficit present. Mental Status: She is alert and oriented to person, place, and time. Mental status is at baseline. Cranial Nerves: No cranial nerve deficit. Psychiatric:         Mood and Affect: Mood normal.             Discharge Plan   Disposition: Home    Provider Follow-Up:   No follow-up provider specified.          Patient Instructions   Diet: renal diet    Activity: activity as tolerated      Discharge Medications         Medication List      CONTINUE taking these medications    albuterol sulfate  (90 Base) MCG/ACT inhaler  Commonly known as:  Proventil HFA  Inhale 2 puffs into the lungs every 6 hours as needed for Wheezing     aspirin EC 81 MG EC tablet     B-D ULTRAFINE III SHORT PEN 31G X 8 MM Misc  Generic drug:  Insulin Pen Needle  USE ONCE DAILY TO INJECT INSULIN DAILY     bimatoprost 0.01 % Soln ophthalmic drops  Commonly known as:  LUMIGAN     * blood glucose monitor kit and supplies  by Does not apply route. Pt will also need new lancet device if not included in kit. * blood glucose monitor kit and supplies  1 kit by Other route 3 times daily Test three times a day & as needed for symptoms of irregular blood glucose. blood glucose test strips  Check blood sugar twice a day for recent medication adjustments. brimonidine 0.15 % ophthalmic solution  Commonly known as:  ALPHAGAN P     carvedilol 12.5 MG tablet  Commonly known as:  COREG     dicyclomine 10 MG capsule  Commonly known as:  BENTYL  Take 1 capsule by mouth 4 times daily     dorzolamide 2 % ophthalmic solution  Commonly known as:  TRUSOPT     famotidine 20 MG tablet  Commonly known as:  PEPCID  TAKE 1 TABLET BY MOUTH TWICE DAILY     fluticasone 50 MCG/ACT nasal spray  Commonly known as:  Flonase  2 sprays by Nasal route daily. To keep ears opened up     homatropine 5 % ophthalmic solution     Insulin Syringe-Needle U-100 31G X 5/16\" 1 ML Misc  Commonly known as:  ACCUSURE INS SYR 1CC/31GX5/16\"  by Does not apply route. ipratropium-albuterol 0.5-2.5 (3) MG/3ML Soln nebulizer solution  Commonly known as:  DUONEB  Inhale 3 mLs into the lungs every 4 hours     lactulose 10 GM/15ML solution  Commonly known as:  CHRONULAC  TAKE 30 ML BY MOUTH THREE TIMES DAILY     Lantus SoloStar 100 UNIT/ML injection pen  Generic drug:  insulin glargine  INJECT 10 UNITS INTO THE SKIN ONCE DAILY     losartan 50 MG tablet  Commonly known as:  COZAAR  TAKE 1 TABLET BY MOUTH TWICE DAILY     midodrine 5 MG tablet  Commonly known as:  PROAMATINE     mupirocin 2 % ointment  Commonly known as:   Bactroban  Apply to each nare bid x 5 days     ondansetron 4 MG tablet  Commonly known as:  ZOFRAN     pravastatin 10 MG tablet  Commonly known as:  PRAVACHOL  TAKE 1 TABLET BY MOUTH EVERY NIGHT AT BEDTIME sevelamer 800 MG tablet  Commonly known as:  RENVELA     timolol 0.5 % ophthalmic gel-forming  Commonly known as:  TIMOPTIC-XE     Travatan Z 0.004 % Soln ophthalmic solution  Generic drug:  Travoprost (ALLISON Free)     Vitamin D3 25 MCG (1000 UT) Caps         * This list has 2 medication(s) that are the same as other medications prescribed for you. Read the directions carefully, and ask your doctor or other care provider to review them with you. STOP taking these medications    gabapentin 100 MG capsule  Commonly known as:  NEURONTIN     ZYRTEC ALLERGY PO            EMR Dragon/Transcription disclaimer:   Much of this encounter note is an electronic transcription/translation of spoken language to printed text.  The electronic translation of spoken language may permit erroneous, or at times, nonsensical words or phrases to be inadvertently transcribed; although attempts have made to review the note for such errors, some may still exist.    Electronically signed by   Jose Weinberg   Internal Medicine Hospitalist  On 10/10/2020  At 3:31 PM

## 2020-10-10 NOTE — CONSULTS
Nephrology ([facility]) Consult Note      Patient:  Chad White  YOB: 1960  Date of Service: 10/10/2020  MRN: [id number]   Acct: [de-identified]   Primary Care Physician: RAMONA Clark  Advance Directive: Full Code  Admit Date: 10/10/2020       Hospital Day: 0  Referring Provider: Adrianna Ward MD    Patient independently seen and examined, Chart, Consults, Notes, Operative notes, Labs, Cardiology, and Radiology studies reviewed as available. Chief complaint: End-stage renal disease/shortness of breath    Subjective:  Chad White is a 56 y.o. female  whom we were consulted for end-stage renal disease. Patient has end-stage renal disease due to diabetic nephropathy and goes to [facility] dialysis clinic on Tuesday Thursday Saturday. Patient has missed 1 treatment on Thursday. Apparently she has been noncompliant and has been missing average 1 treatment a week. Presented with increasing shortness of breath, dyspnea on exertion and hypoxemic respiratory failure. Chest x-ray shows florid pulmonary edema. Patient also has history of COPD, chronic tobacco use, type 2 insulin-dependent diabetes and hypertension. Currently seen on hemodialysis  Hemodialysis access: AV fistula  Hemodialysis: 4-hour  Ultrafiltration: 5000 cc  2K bath  Blood flow rate is 450 cc/min    Allergies:  Bupropion; Sulfa antibiotics; Varenicline; Wellbutrin [bupropion hcl]; Azithromycin;  Levaquin [levofloxacin]; and Penicillins    Medicines:  Current Facility-Administered Medications   Medication Dose Route Frequency Provider Last Rate Last Dose    sodium chloride flush 0.9 % injection 10 mL  10 mL Intravenous 2 times per day Oliver Diaz MD   10 mL at 10/10/20 0748    sodium chloride flush 0.9 % injection 10 mL  10 mL Intravenous PRN Oliver Diaz MD        acetaminophen (TYLENOL) tablet 650 mg  650 mg Oral Q6H PRN Oliver Diaz MD        Or    acetaminophen (TYLENOL) (DUONEB) nebulizer solution 3 mL  1 vial Inhalation Q4H Martita Gonzáles MD   3 mL at 10/10/20 0413    lactulose (CHRONULAC) 10 GM/15ML solution 30 g  30 g Oral TID Martita Gonzáles MD        losartan (COZAAR) tablet 50 mg  50 mg Oral BID Martita Gonzáles MD        midodrine (PROAMATINE) tablet 2.5 mg  2.5 mg Oral PRN Martita Gonzáles MD        mupirocin (BACTROBAN) 2 % ointment   Nasal BID Martita Gonzáles MD           Past Medical History:  Past Medical History:   Diagnosis Date    Blind right eye     partial vision loss in the L eye too, due to DM    Blindness of one eye     Right     [facility]est[facility] [address]     CKD (chronic kidney disease), stage IV (HCC)     secondary to diabetic nephropathy    Diabetes ([facility] [address]     Diabetes mellitus with ophthalmic manifestations     dx'd in 2000 but likely ongoing before that, was dx'd when she went blind in the R eye    Glaucoma     Hemodialysis patient [facility])     dialysis c[facility], thurs, sat. [facility]    Hypertension     2000, dx'd at same time as the DM    Obesity     Renal osteodystrophy     Smoker     Type II or unspecified type diabetes mellitus with renal manifestations, uncontrolled(250.42)        Past Surgical History:  Past Surgical History:   Procedure Laterality Date    CARPAL TUNNEL RELEASE      CATARACT REMOVAL     [address], 1978, and 1979    CHOLECYSTECTOMY      COLONOSCOPY N/A 3/9/2018    Dr ReevesAczevl-Pzufyjdcfkpfor-Zgmcgrxszwkdw AP (-) dysplasia x 1, tubular AP x  (-) dysplasia x 1, HP x 4--1 yr recall    DIALYSIS FISTULA CREATION Left 4/23/15 [facility]    Left proximal ulnar artery to cephalic vein AVF creation    EYE SURGERY      laser, several times     TYMPANOSTOMY TUBE PLACEMENT Bilateral     VASCULAR SURGERY Left 5/11/15 [facility]    US-guided cannulation left distal upper arm cephalic vein with 4-Romansh glide sheath; ARSENIO RICKS f'grams with venography SVC; left cephalic vein BAM with [id number] julieth balloon; completion venogram [facility]    VASCULAR SURGERY Left 5/28/15 S    Percutaneous cannulation left distal radial artery with 4-Belgian glide sheath; LUE AV f'grams with venography SVC; left cephalic vein balloon a'plasty with [id number] julieth balloon and [id number] julieth balloon; proximal and mid upper arm cephalic vein BAM with [id number] julieth balloon; completion venogram [facility]    VASCULAR SURGERY Left 6/15/15 S    US-guided cannulation left cephalic vein with 4-Belgian and later 7-Belgian sheath; LUE AV f'grams including venography SVC; left cephalic vein stenosis balloon a'plasty with [id number] cutting balloon; completion f'gram and venogram [facility]    VASCULAR SURGERY  05/15/2017    [facility]. Left upper fistulograms/venograms.  VASCULAR SURGERY  02/14/2018    [facility]. Left upper fistulograms, left subclavian BA 12x40 atlas, left cephalic arch BA 80J87 conquest.    VASCULAR SURGERY  02/13/2019    [facility]. Left upper fistulograms,BA left SCV 8x40 conquest,stent left SCV, viabahn 13x50,left SCV stent BA 12x40 conquest.       Family History  Family History   Problem Relation Age of Onset    Heart Disease Mother     Heart Attack Mother     Glaucoma Sister     Diabetes Sister     Diabetes Brother     Kidney Disease Brother     Blindness Brother     Stroke Maternal Grandmother     Stroke Maternal Grandfather     Colon Cancer Neg Hx     Colon Polyps Neg Hx     Liver Cancer Neg Hx     Liver Disease Neg Hx     Esophageal Cancer Neg Hx     Rectal Cancer Neg Hx     Stomach Cancer Neg Hx        Social History  Social History     Socioeconomic History    Marital status:      Spouse name: Not on file    Number of children: Not on file    Years of education: Not on file    Highest education level: Not on file   Occupational History    Not on file   Social Needs    Financial resource strain: Not on file    Food insecurity     Worry: Not on file     Inability: Not on file    Transportation needs     Medical: Not on file     Non-medical: Not on file   Tobacco Use    Smoking status: Current Every Day Smoker     Packs/day: 1.00     Years: 40.00     Pack years: 40.00    Smokeless tobacco: Never Used   Substance and Sexual Activity    Alcohol use: No     Alcohol/week: 0.0 standard drinks    Drug use: Yes     Types: Marijuana    Sexual activity: Not Currently     Partners: Male   Lifestyle    Physical activity     Days per week: Not on file     Minutes per session: Not on file    Stress: Not on file   Relationships    Social connections     Talks on phone: Not on file     Gets together: Not on file     Attends Jain service: Not on file     Active member of club or organization: Not on file     Attends meetings of clubs or organizations: Not on file     Relationship status: Not on file    Intimate partner violence     Fear of current or ex partner: Not on file     Emotionally abused: Not on file     Physically abused: Not on file     Forced sexual activity: Not on file   Other Topics Concern    Not on file   Social History Narrative    Not on file         Review of Systems:  History obtained from chart review and the patient  General ROS: No fever or chills  Respiratory ROS: positive for - shortness of breath  Cardiovascular ROS: positive for - shortness of breath  Gastrointestinal ROS: No abdominal pain or melena  Genito-Urinary ROS: No dysuria or hematuria  Musculoskeletal ROS: No joint pain or swelling   14 point ROS reviewed with the patient and negative except as noted above and in the HPI unless unable to obtain.     Objective:  Patient Vitals for the past 24 hrs:   BP Temp Temp src Pulse Resp SpO2 Height Weight   10/10/20 0605 (!) 158/78 96.6 °F (35.9 °C) Temporal 71 17 90 % -- --   10/10/20 0414 -- 96.6 °F (35.9 °C) Temporal 73 18 90 % -- --   10/10/20 0344 -- -- -- 75 14 96 % -- --   10/10/20 0207 (!) 144/67 -- -- -- -- 91 % -- --   10/10/20 0206 (!) 144/67 98 °F (36.7 °C) -- 74 18 92 % 4' 11\" (1.499 m) 163 lb (73.9 kg)     No intake or output data in the 24 hours ending 10/10/20 1000  General: awake/alert   HEENT: Normocephalic atraumatic head  Neck: Supple with mild JVD. Chest:  rales present-bilaterally and decreased breath sounds at all lung fields. CVS: regular rate and rhythm  Abdominal: soft, nontender, normal bowel sounds  Extremities: no cyanosis or edema  Skin: warm and dry without rash      Labs:  BMP:   Recent Labs     10/10/20  0212      K 4.3   CL 95*   CO2 26   BUN 65*   CREATININE 7.2*   CALCIUM 9.3     CBC:   Recent Labs     10/10/20  0212   WBC 4.9   HGB 11.4*   HCT 37.7   MCV 92.0        LIVER PROFILE:   Recent Labs     10/10/20  0212   AST 21   ALT 9   BILITOT 0.5   ALKPHOS 298*     PT/INR: No results for input(s): PROTIME, INR in the last 72 hours. APTT: No results for input(s): APTT in the last 72 hours. BNP:  No results for input(s): BNP in the last 72 hours. Ionized Calcium:No results for input(s): IONCA in the last 72 hours. Magnesium:No results for input(s): MG in the last 72 hours. Phosphorus:No results for input(s): PHOS in the last 72 hours. HgbA1C: No results for input(s): LABA1C in the last 72 hours. Hepatic:   Recent Labs     10/10/20  0212   ALKPHOS 298*   ALT 9   AST 21   PROT 6.6   BILITOT 0.5   LABALBU 3.7     Lactic Acid: No results for input(s): LACTA in the last 72 hours. Troponin: No results for input(s): CKTOTAL, CKMB, TROPONINT in the last 72 hours. ABGs: No results for input(s): PH, PCO2, PO2, HCO3, O2SAT in the last 72 hours. CRP:  No results for input(s): CRP in the last 72 hours. Sed Rate:  No results for input(s): SEDRATE in the last 72 hours. Cultures:   No results for input(s): CULTURE in the last 72 hours. No results for input(s): BC, [place] in the last 72 hours. No results for input(s): CXSURG in the last 72 hours.     Radiology reports as per the Radiologist  Radiology: Xr Hip Bilateral W Ap Pelvis (2 Views)    Result Date: 10/10/2020  EXAM: XR HIP BILATERAL W AP PELVIS (2 VIEWS) -- 10/10/2020 1:23 AM HISTORY: 56 years, Female, hip pain, fall one month ago COMPARISON: No existing relevant imaging studies available TECHNIQUE:  5 images. AP pelvis, AP and lateral views of the bilateral hips FINDINGS:  Pelvis appears intact. Sacroiliac joints symmetric. Upper sacral arcuate lines intact. Lower sacrum limited in evaluation due to overlying bowel gas. Pubic symphysis anatomic. Mild degenerative changes of the lumbar spine, not optimally assessed on this exam. Bilateral hips appear intact with moderate osteoarthritis type changes. Vascular calcifications. No acute bony finding. Agree with Valerie Wolfe \"no fracture or dislocation. \" Signed by Dr Ruben Ayala on 10/10/2020 6:58 AM    Xr Chest Portable    Result Date: 10/10/2020  EXAM: XR CHEST PORTABLE -- 10/10/2020 1:23 AM HISTORY: 60 years, Female, leg swelling, ESRD, missed dialysis COMPARISON: 9/1/2020 TECHNIQUE:  1 images. Frontal view of the chest. FINDINGS:  No pneumothorax. Similar right basilar opacity and effusion. Diffuse interstitial opacities suggest pulmonary edema. Prominent cardiac silhouette. Upper mediastinum within normal limits. No acute bony finding. 1. Diffuse interstitial opacities suggest pulmonary edema. 2. Similar chronic right basilar opacity and effusion. Agree with Valerie Wolfe \"interstitial prominence, question overload, right pleural effusion. \" Signed by Dr Ruben Ayala on 10/10/2020 7:00 AM       Assessment   1. End-stage renal disease/seen on dialysis. 2.  Severe pulmonary edema/acute respiratory failure. 3.  Noncompliant dialysis patient. 4.  Type II diabetic nephropathy. 5.  Active tobacco use. 6.  Recurrent hospitalization with pulmonary edema. 7.  Secondary hyperparathyroidism. Plan:  1. Tolerating dialysis well. 2.  Ultrafiltration up to 5000 cc.   3.  Counseling about compliance and explained the consequences of skipping dialysis. Thank you for the consult, we appreciate the opportunity to provide care to your patients. Feel free to contact me if I can be of any further assistance.       Bronwyn Bahena MD  10/10/20  10:00 AM

## 2020-10-10 NOTE — ED NOTES
Bed: 05  Expected date: 10/10/20  Expected time:   Means of arrival: [facility]  Comments:  [facility] leg pain     Alcides Velasco RN  10/10/20 0901

## 2020-10-12 ENCOUNTER — TELEPHONE (OUTPATIENT)
Dept: PRIMARY CARE CLINIC | Age: 60
End: 2020-10-12

## 2020-10-12 NOTE — TELEPHONE ENCOUNTER
[facility] Transitions Initial Follow Up Call    Outreach made within 2 business days of discharge: Yes    Patient: Bunny Adler Patient : 1960   MRN: [id number]    Reason for Admission:   Active Problems:    Volume overload  Resolved Problems:    * No resolved hospital problems. Admit Date:10/10/2020   Discharge Date: 10/10/20       Spoke with: patient    Discharge department/facility: [facility]    [facility] Interactive Patient Contact:  Was patient able to fill all prescriptions: Yes  Was patient instructed to bring all medications to the follow-up visit: Yes  Is patient taking all medications as directed in the discharge summary? Yes  Does patient understand their discharge instructions: Yes  Does patient have questions or concerns that need addressed prior to 7-14 day follow up office visit: no    Per patient she is doing pretty good. She reports her appetite is good and she has been resting well. Patient reports she has been going to dialysis on Tues, Thurs, Sat. She requests a VV. Appointment made with Geeta Pace on Friday. Patient aware of upcoming appt. Advised her to call office if she has any questions or concerns before then. Patient voiced understanding.      Scheduled appointment with PCP within 7-14 days    Follow Up  Future Appointments   Date Time Provider Darrell Price   10/15/2020 10:00 AM SCHEDULE, [facility] PRE[facility]ADMISS[facility]   2020 10:45 AM RAMONA Tim [facility]        Inge Garces LPN

## 2020-10-13 LAB
EKG P AXIS: 64 DEGREES
EKG P-R INTERVAL: 148 MS
EKG Q-T INTERVAL: 416 MS
EKG QRS DURATION: 80 MS
EKG QTC CALCULATION (BAZETT): 438 MS
EKG T AXIS: 20 DEGREES

## 2020-10-13 PROCEDURE — 93010 ELECTROCARDIOGRAM REPORT: CPT | Performed by: INTERNAL MEDICINE

## 2020-10-13 NOTE — DISCHARGE INSTR - LAB
Nephrology clinic  On January 3 at 2:40 pm   Per anais per Dr Eller writer instructed patient and his caregiver to go to ED for further evaluation. \"Ticket to Ride\" given to caregiver. Both verbalized understanding.

## 2020-10-14 NOTE — PROGRESS NOTES
Refill    losartan (COZAAR) 50 MG tablet TAKE 1 TABLET BY MOUTH TWICE DAILY 180 tablet 0    famotidine (PEPCID) 20 MG tablet TAKE 1 TABLET BY MOUTH TWICE DAILY 60 tablet 11    insulin glargine (LANTUS SOLOSTAR) 100 UNIT/ML injection pen INJECT 10 UNITS INTO THE SKIN ONCE DAILY 15 mL 1    pravastatin (PRAVACHOL) 10 MG tablet TAKE 1 TABLET BY MOUTH EVERY NIGHT AT BEDTIME 30 tablet 11    ondansetron (ZOFRAN) 4 MG tablet Take 4 mg by mouth every 6 hours      ipratropium-albuterol (DUONEB) 0.5-2.5 (3) MG/3ML SOLN nebulizer solution Inhale 3 mLs into the lungs every 4 hours 360 mL 1    albuterol sulfate HFA (PROVENTIL HFA) 108 (90 Base) MCG/ACT inhaler Inhale 2 puffs into the lungs every 6 hours as needed for Wheezing 1 Inhaler 3    dicyclomine (BENTYL) 10 MG capsule Take 1 capsule by mouth 4 times daily 120 capsule 5    B-D ULTRAFINE III SHORT PEN 31G X 8 MM MISC USE ONCE DAILY TO INJECT INSULIN DAILY 100 each 3    carvedilol (COREG) 12.5 MG tablet Take 12.5 mg by mouth 2 times daily (with meals)      lactulose (CHRONULAC) 10 GM/15ML solution TAKE 30 ML BY MOUTH THREE TIMES DAILY 8108 mL 1    Cholecalciferol (VITAMIN D3) 1000 units CAPS Take by mouth      Travoprost, BAK Free, (TRAVATAN Z) 0.004 % SOLN ophthalmic solution 1 drop nightly      midodrine (PROAMATINE) 5 MG tablet TK 1 T PO HALF WAY THROUGH TREATMENT AS DIRECTED      sevelamer (RENVELA) 800 MG tablet Take 1 tablet by mouth daily       aspirin EC 81 MG EC tablet Take 81 mg by mouth daily      fluticasone (FLONASE) 50 MCG/ACT nasal spray 2 sprays by Nasal route daily. To keep ears opened up 1 Bottle 11    Blood Glucose Monitoring Suppl NIKOS by Does not apply route. Pt will also need new lancet device if not included in kit. 1 Device 0    Glucose Blood (BLOOD GLUCOSE TEST STRIPS) STRP Check blood sugar twice a day for recent medication adjustments. 100 strip 11    bimatoprost 0.01 % SOLN Apply 1 drop to eye nightly.  One drop in left eye at hs  Insulin Syringe-Needle U-100 (ACCUSURE INS SYR 1CC/31GX5/16\") 31G X 5/16\" 1 ML MISC by Does not apply route. 100 each 3    timolol (TIMOPTIC-XR) 0.5 % ophthalmic gel-forming Place 1 drop into the left eye 2 times daily       dorzolamide (TRUSOPT) 2 % ophthalmic solution Place 1 drop into the left eye 2 times daily       brimonidine (ALPHAGAN P) 0.15 % ophthalmic solution Place 1 drop into the left eye 3 times daily       homatropine 5 % ophthalmic solution Place 1 drop into the left eye daily       blood glucose monitor kit and supplies 1 kit by Other route 3 times daily Test three times a day & as needed for symptoms of irregular blood glucose. 1 kit 0     No current facility-administered medications for this visit. Allergies: Bupropion; Sulfa antibiotics; Varenicline; Wellbutrin [bupropion hcl]; Azithromycin;  Levaquin [levofloxacin]; and Penicillins  Past Medical History:   Diagnosis Date    Blind right eye     partial vision loss in the L eye too, due to DM    Blindness of one eye     Right     CHF (congestive heart failure) (HCC)     CKD [facility]chronic kidney disease), [facility] IV ([facility])     secondary to diabetic nephropathy    [facility]     Diabetes mellitus with ophthalmic manifestations     dx'd in 2000 but likely ongoing before that, was dx'd when she went blind in the R eye    Glaucoma     [facility]od[facility] patient ([facility]     dialysis tues, thurs, sat. [address], [facility]    Hypertension     2000, dx'd at same time as the DM    Obesity     Renal osteodystrophy     Smoker     Type II or unspecified type diabetes mellitus with renal manifestations, uncontrolled(250.42)      Past Surgical History:   Procedure Laterality Date    CARPAL TUNNEL RELEASE      CATARACT REMOVAL     [address], and 1979    CHOLECYSTECTOMY      COLONOSCOPY N/A 3/9/2018    Dr ReevesGshkew-Oagyveqhhbwoxe-Blzxwetrzlteq AP (-) dysplasia x 1, tubular AP x  (-) dysplasia x 1, HP x 4--1 yr recall status: Current Every Day Smoker     Packs/day: 1.00     Years: 40.00     Pack years: 40.00    Smokeless tobacco: Never Used   Substance Use Topics    Alcohol use: No     Alcohol/week: 0.0 standard drinks         Review of Systems    Constitutional - no significant activity change, appetite change, or unexpected weight change. No fever or chills. No diaphoresis or significant fatigue. HENT - no significant rhinorrhea or epistaxis. No tinnitus or significant hearing loss. Eyes - no sudden vision change or amaurosis. Respiratory - no significant shortness of breath, wheezing, or stridor. No apnea, cough, or chest tightness associated with shortness of breath. Cardiovascular - no chest pain, syncope, or significant dizziness. No palpitations or significant leg swelling. (see HPI)  Gastrointestinal - no abdominal swelling or pain. No blood in stool. No severe constipation, diarrhea, nausea, or vomiting. Genitourinary - No difficulty urinating, dysuria, frequency, or urgency. No flank pain or hematuria. Musculoskeletal - no back pain, gait disturbance, or myalgia. Skin - no color change, rash, pallor, or new wound. Neurologic - no dizziness, facial asymmetry, or light headedness. No seizures. No speech difficulty or lateralizing weakness. Hematologic - no easy bruising or excessive bleeding. Psychiatric - no severe anxiety or nervousness. No confusion. All other review of systems are negative. Physical Exam    BP (!) 183/78 (Site: Right Upper Arm)   Pulse 76   Temp 97.6 °F (36.4 °C) (Temporal)   Resp 16   Ht 4' 11\" (1.499 m)   Wt 165 lb (74.8 kg)   SpO2 97%   BMI 33.33 kg/m²     Constitutional - well developed, well nourished. No diaphoresis or acute distress. HENT - head normocephalic. Right external ear canal appears normal.  Left external ear canal appears normal.  Septum appears midline. Eyes - conjunctiva normal.  EOMS normal.  No exudate. No icterus.   Neck- ROM appears normal, no tracheal deviation. Cardiovascular - Regular rate and rhythm. Heart sounds are normal.  No murmur, rub, or gallop. Carotid pulses are 2+ to palpation bilaterally without bruit. Extremities - Radial and ulnar pulses are 2+ to palpation bilaterally. Femoral pulses cannot be assessed because patient is seated in the wheelchair and could not step up on to lay down. DP and PT pulses are not palpable. No cyanosis, clubbing, or significant edema. No signs atheroembolic event. Pulmonary - effort appears normal.  No respiratory distress. Lungs - Breath sounds normal. No wheezes or rales. GI - Abdomen - soft, non tender, bowel sounds X 4 quadrants. No guarding or rebound tenderness. No distension or palpable mass. Genitourinary - deferred. Musculoskeletal - ROM appears normal.  No significant edema. Neurologic - alert and oriented X 3. Physiologic. Skin - warm, dry, and intact. No rash, erythema, or pallor. Psychiatric - mood, affect, and behavior appear normal.  Judgment and thought processes appear normal.    Risk factors for atherosclerosis of all vascular beds have been reviewed with the patient including:  Family history, tobacco abuse in all forms, elevated cholesterol, hyperlipidemia, and diabetes. Lower extremity arterial study:   Right MANDO 0.62, Left MANDO 0.61. Individual films reviewed: Yes. Test results were reviewed with the patient. Assessment    No diagnosis found. Plan    Recommend continue ASA EC 81 mg daily  start Plavix 75 mg daily post procedure  Strongly encourage ambulation    Patient was discussed complexity of her pain problem. Leg revascularization would remove claudcation part of the pain problemRecommend to perform angiogram. Risks, benefits and alternatives of procedure were discussed.  Patient understood and agreed  Good skin care/checks daily  Recommend no smoking  We discussed a low fat, low cholesterol diet   Discussed walking plan    No follow-ups on file.  or sooner if claudication worsens, patient develops wound or IRP

## 2020-10-15 ENCOUNTER — TELEPHONE (OUTPATIENT)
Dept: VASCULAR SURGERY | Age: 60
End: 2020-10-15

## 2020-10-15 NOTE — TELEPHONE ENCOUNTER
Spoke with patient in regards to her pre-op and COVID test scheduled for today. Patient states that she forgot to go for the pre-op and COVID test. Informed her that we will need to reschedule surgery with Dr. Patel Juarez on 10/19/20. I will get her surgery and pre-op/COVID test rescheduled and give her a call tomorrow with the details/instructions.

## 2020-10-16 ENCOUNTER — TELEPHONE (OUTPATIENT)
Dept: VASCULAR SURGERY | Age: 60
End: 2020-10-16

## 2020-10-16 ENCOUNTER — VIRTUAL VISIT (OUTPATIENT)
Dept: PRIMARY CARE CLINIC | Age: 60
End: 2020-10-16
Payer: MEDICARE

## 2020-10-16 PROCEDURE — 2022F DILAT RTA XM EVC RTNOPTHY: CPT | Performed by: NURSE PRACTITIONER

## 2020-10-16 PROCEDURE — 3044F HG A1C LEVEL LT 7.0%: CPT | Performed by: NURSE PRACTITIONER

## 2020-10-16 PROCEDURE — 1111F DSCHRG MED/CURRENT MED MERGE: CPT | Performed by: NURSE PRACTITIONER

## 2020-10-16 PROCEDURE — G8427 DOCREV CUR MEDS BY ELIG CLIN: HCPCS | Performed by: NURSE PRACTITIONER

## 2020-10-16 PROCEDURE — 3017F COLORECTAL CA SCREEN DOC REV: CPT | Performed by: NURSE PRACTITIONER

## 2020-10-16 PROCEDURE — 99214 OFFICE O/P EST MOD 30 MIN: CPT | Performed by: NURSE PRACTITIONER

## 2020-10-16 RX ORDER — ALBUTEROL SULFATE 90 UG/1
2 AEROSOL, METERED RESPIRATORY (INHALATION) EVERY 6 HOURS PRN
Qty: 1 INHALER | Refills: 3 | Status: SHIPPED | OUTPATIENT
Start: 2020-10-16 | End: 2021-03-01 | Stop reason: SDUPTHER

## 2020-10-16 ASSESSMENT — ENCOUNTER SYMPTOMS
RESPIRATORY NEGATIVE: 1
GASTROINTESTINAL NEGATIVE: 1
EYES NEGATIVE: 1

## 2020-10-16 NOTE — PROGRESS NOTES
Consent:  Srinivasa Ellis  and/or health care decision maker is aware that that she may receive a bill for this telephone service, depending on her insurance coverage, and has provided verbal consent to proceed: Yes     Post-Discharge Transitional Care Management Services or Hospital Follow Up      Srinivasa Elils   YOB: 1960    Date of Office Visit:  10/16/2020  Date of Hospital Admission: 10/10/20  Date of Hospital Discharge: 10/10/20  Readmission Risk Score(high >=14%.  Medium >=10%):Readmission Risk Score: 39      Care management risk score Rising risk (score 2-5) and Complex Care (Scores >=6): 14     Non face to face  following discharge, date last encounter closed (first attempt may have been earlier): 10/12/2020 12:56 PM 10/12/2020 12:56 PM    Call initiated 2 business days of discharge: Yes     Patient Active Problem List   Diagnosis    Type 2 diabetes mellitus with ophthalmic complication, with long-term current use of insulin ([address]    Arthropathy    Proteinuria    Disorder of phosphorus metabolism    HTN (hypertension)    Glaucoma    Diabetic nephropathy ([address]    Breast density    Dialysis AV fistula malfunction ([address]    ESRD (end stage renal disease) on dialysis ([address]    CKD (chronic kidney disease) stage V requiring chronic dialysis ([address]    Acute diastolic heart failure ([address]    Volume overload    SOB (shortness of breath)    Hyponatremia    Alkaline phosphatase elevation    Pleural effusion on right    Acute respiratory failure with hypoxia and hypercarbia (HCC)       Allergies   Allergen Reactions    Bupropion Nausea Only    Sulfa Antibiotics      Causes hypoglycemia     Varenicline     Wellbutrin [Bupropion Hcl] Nausea Only    Azithromycin Rash    Levaquin [Levofloxacin] Rash    Penicillins Rash       Medications listed as ordered at the time of discharge from hospital   [place], [address] Medication Instructions [facility]:    Printed on:10/16/20 [id number] Medication Information                      albuterol sulfate HFA (PROVENTIL HFA) 108 (90 Base) MCG/ACT inhaler  Inhale 2 puffs into the lungs every 6 hours as needed for Wheezing             aspirin EC 81 MG EC tablet  Take 81 mg by mouth daily             B-D ULTRAFINE III SHORT PEN 31G X 8 MM MISC  USE ONCE DAILY TO INJECT INSULIN DAILY             bimatoprost 0.01 % SOLN  Apply 1 drop to eye nightly. One drop in left eye at              blood glucose monitor kit and supplies  1 kit by Other route 3 times daily Test three times a day & as needed for symptoms of irregular blood glucose. Blood Glucose Monitoring Suppl NIKOS  by Does not apply route. Pt will also need new lancet device if not included in kit. brimonidine (ALPHAGAN P) 0.15 % ophthalmic solution  Place 1 drop into the left eye 3 times daily              carvedilol (COREG) 12.5 MG tablet  Take 12.5 mg by mouth 2 times daily (with meals)             Cholecalciferol (VITAMIN D3) 1000 units CAPS  Take by mouth             dicyclomine (BENTYL) 10 MG capsule  Take 1 capsule by mouth 4 times daily             dorzolamide (TRUSOPT) 2 % ophthalmic solution  Place 1 drop into the left eye 2 times daily              famotidine (PEPCID) 20 MG tablet  TAKE 1 TABLET BY MOUTH TWICE DAILY             fluticasone (FLONASE) 50 MCG/ACT nasal spray  2 sprays by Nasal route daily. To keep ears opened up             Glucose Blood (BLOOD GLUCOSE TEST STRIPS) STRP  Check blood sugar twice a day for recent medication adjustments. homatropine 5 % ophthalmic solution  Place 1 drop into the left eye daily              insulin glargine (LANTUS SOLOSTAR) 100 UNIT/ML injection pen  INJECT 10 UNITS INTO THE SKIN ONCE DAILY             Insulin Syringe-Needle U-100 (ACCUSURE INS SYR 1CC/31GX5/16\") 31G X 5/16\" 1 ML MISC  by Does not apply route.              ipratropium-albuterol (DUONEB) 0.5-2.5 (3) MG/3ML SOLN nebulizer solution  Inhale 3 mLs into the lungs every 4 hours             lactulose (CHRONULAC) 10 GM/15ML solution  TAKE 30 ML BY MOUTH THREE TIMES DAILY             losartan (COZAAR) 50 MG tablet  TAKE 1 TABLET BY MOUTH TWICE DAILY             midodrine (PROAMATINE) 5 MG tablet  TK 1 T PO HALF WAY THROUGH TREATMENT AS DIRECTED             ondansetron (ZOFRAN) 4 MG tablet  Take 4 mg by mouth every 6 hours             pravastatin (PRAVACHOL) 10 MG tablet  TAKE 1 TABLET BY MOUTH EVERY NIGHT AT BEDTIME             sevelamer (RENVELA) 800 MG tablet  Take 1 tablet by mouth daily              timolol (TIMOPTIC-XR) 0.5 % ophthalmic gel-forming  Place 1 drop into the left eye 2 times daily              Travoprost, BAK Free, (TRAVATAN Z) 0.004 % SOLN ophthalmic solution  1 drop nightly                   Medications marked \"taking\" at this time  Outpatient Medications Marked as Taking for the 10/16/20 encounter (Virtual Visit) with RAMONA Sepulveda NP   Medication Sig Dispense Refill    albuterol sulfate HFA (PROVENTIL HFA) 108 (90 Base) MCG/ACT inhaler Inhale 2 puffs into the lungs every 6 hours as needed for Wheezing 1 Inhaler 3    losartan (COZAAR) 50 MG tablet TAKE 1 TABLET BY MOUTH TWICE DAILY 180 tablet 0    famotidine (PEPCID) 20 MG tablet TAKE 1 TABLET BY MOUTH TWICE DAILY 60 tablet 11    insulin glargine (LANTUS SOLOSTAR) 100 UNIT/ML injection pen INJECT 10 UNITS INTO THE SKIN ONCE DAILY 15 mL 1    pravastatin (PRAVACHOL) 10 MG tablet TAKE 1 TABLET BY MOUTH EVERY NIGHT AT BEDTIME 30 tablet 11    ipratropium-albuterol (DUONEB) 0.5-2.5 (3) MG/3ML SOLN nebulizer solution Inhale 3 mLs into the lungs every 4 hours 360 mL 1    dicyclomine (BENTYL) 10 MG capsule Take 1 capsule by mouth 4 times daily 120 capsule 5    B-D ULTRAFINE III SHORT PEN 31G X 8 MM MISC USE ONCE DAILY TO INJECT INSULIN DAILY 100 each 3    carvedilol (COREG) 12.5 MG tablet Take 12.5 mg by mouth 2 times daily (with meals)      Cholecalciferol (VITAMIN D3) 1000 units CAPS Take by mouth      blood glucose monitor kit and supplies 1 kit by Other route 3 times daily Test three times a day & as needed for symptoms of irregular blood glucose. 1 kit 0    Travoprost, BAK Free, (TRAVATAN Z) 0.004 % SOLN ophthalmic solution 1 drop nightly      sevelamer (RENVELA) 800 MG tablet Take 1 tablet by mouth daily       aspirin EC 81 MG EC tablet Take 81 mg by mouth daily      fluticasone (FLONASE) 50 MCG/ACT nasal spray 2 sprays by Nasal route daily. To keep ears opened up 1 Bottle 11    Blood Glucose Monitoring Suppl NIKOS by Does not apply route. Pt will also need new lancet device if not included in kit. 1 Device 0    Glucose Blood (BLOOD GLUCOSE TEST STRIPS) STRP Check blood sugar twice a day for recent medication adjustments. 100 strip 11    bimatoprost 0.01 % SOLN Apply 1 drop to eye nightly. One drop in left eye at hs      Insulin Syringe-Needle U-100 (ACCUSURE INS SYR 1CC/31GX5/16\") 31G X 5/16\" 1 ML MISC by Does not apply route. 100 each 3    timolol (TIMOPTIC-XR) 0.5 % ophthalmic gel-forming Place 1 drop into the left eye 2 times daily       dorzolamide (TRUSOPT) 2 % ophthalmic solution Place 1 drop into the left eye 2 times daily       brimonidine (ALPHAGAN P) 0.15 % ophthalmic solution Place 1 drop into the left eye 3 times daily       homatropine 5 % ophthalmic solution Place 1 drop into the left eye daily           Medications patient taking as of now reconciled against medications ordered at time of hospital discharge: Yes    Chief Complaint   Patient presents with    Follow-Up from Hospital       HPI    Inpatient course: Discharge summary reviewed- see chart. Interval history/Current status: stable  Patient reports she went to the ER for evaluation of hip pain and was admitted due to need for dialysis. Patient reports she missed her dialysis appointment due to the ER visit. Review of Systems   Constitutional: Positive for fatigue. HENT: Negative. Eyes: Negative. Respiratory: Negative. Cardiovascular: Negative. Gastrointestinal: Negative. Endocrine: Negative. Genitourinary: Negative. Musculoskeletal: Positive for arthralgias. Skin: Negative. Neurological: Negative. Hematological: Negative. Psychiatric/Behavioral: Negative. There were no vitals filed for this visit. There is no height or weight on file to calculate BMI. Wt Readings from Last 3 Encounters:   10/10/20 163 lb (73.9 kg)   10/07/20 165 lb (74.8 kg)   09/01/20 153 lb 7 oz (69.6 kg)     BP Readings from Last 3 Encounters:   10/10/20 (!) 154/69   10/07/20 (!) 183/78   09/03/20 (!) 152/63     Unable to complete physical assessment; patient does not have access to smart phone     Assessment/Plan:  1. Hospital discharge follow-up  - [place] DISCHARGE MEDS RECONCILED W/ CURRENT OUTPATIENT MED LIST    2. Type 2 diabetes mellitus with other ophthalmic complication, with long-term current use of insulin ([facility]    3. Essential hypertension    4. ESRD (end stage renal disease) on dialysis ([facility]    5. Interstitial lung disease (HCC)  - albuterol sulfate HFA (PROVENTIL HFA) 108 (90 Base) MCG/ACT inhaler; Inhale 2 puffs into the lungs every 6 hours as needed for Wheezing  Dispense: 1 Inhaler; Refill: 3      Medical Decision Making: moderate complexity    Pursuant to the emergency declaration under the [facility] waiver authority and the [employer] Act, this Virtual  Visit was conducted, with patient's consent, to reduce the patient's risk of exposure to COVID-19 and provide continuity of care for an established patient. Services were provided through a video synchronous discussion virtually to substitute for in-person clinic visit.

## 2020-10-16 NOTE — TELEPHONE ENCOUNTER
Spoke with patient to set up a date to reschedule surgery with Dr. Morales Spicer. Date for surgery has been set up for 11/2/20. Informed her that I would reschedule her surgery and give her a call next week with details/instructions.

## 2020-10-19 ENCOUNTER — TELEPHONE (OUTPATIENT)
Dept: VASCULAR SURGERY | Age: 60
End: 2020-10-19

## 2020-10-19 NOTE — TELEPHONE ENCOUNTER
Spoke with patient to go over details/instructions for rescheduled surgery with Dr. Lisa Milligan on 11/2/20. Patient states that she has decided not to proceed with surgery. 10/21/20 Dr. Lisa Milligan was informed that patient has decided not to proceed with surgery.

## 2020-11-04 ENCOUNTER — HOSPITAL ENCOUNTER (EMERGENCY)
Age: 60
Discharge: HOME | End: 2020-11-04
Attending: EMERGENCY MEDICINE
Payer: MEDICARE

## 2020-11-04 VITALS
SYSTOLIC BLOOD PRESSURE: 129 MMHG | RESPIRATION RATE: 20 BRPM | BODY MASS INDEX: 33.58 KG/M2 | WEIGHT: 160 LBS | HEART RATE: 72 BPM | OXYGEN SATURATION: 94 % | DIASTOLIC BLOOD PRESSURE: 57 MMHG | HEIGHT: 58 IN

## 2020-11-04 PROCEDURE — 99999 PR OFFICE/OUTPT VISIT,PROCEDURE ONLY: CPT | Performed by: EMERGENCY MEDICINE

## 2020-11-04 PROCEDURE — 99285 EMERGENCY DEPT VISIT HI MDM: CPT

## 2020-11-04 PROCEDURE — 6370000000 HC RX 637 (ALT 250 FOR IP): Performed by: EMERGENCY MEDICINE

## 2020-11-04 RX ORDER — GABAPENTIN 100 MG/1
100 CAPSULE ORAL DAILY
Qty: 30 CAPSULE | Refills: 1 | Status: SHIPPED | OUTPATIENT
Start: 2020-11-04 | End: 2020-11-11

## 2020-11-04 RX ORDER — PREGABALIN 75 MG/1
75 CAPSULE ORAL ONCE
Status: COMPLETED | OUTPATIENT
Start: 2020-11-04 | End: 2020-11-04

## 2020-11-04 RX ADMIN — PREGABALIN 75 MG: 75 CAPSULE ORAL at 05:05

## 2020-11-04 ASSESSMENT — ENCOUNTER SYMPTOMS
SHORTNESS OF BREATH: 0
DIARRHEA: 0
NAUSEA: 0
CHEST TIGHTNESS: 0
RHINORRHEA: 0
COUGH: 0
BACK PAIN: 0
WHEEZING: 0
CONSTIPATION: 0
COLOR CHANGE: 0
PHOTOPHOBIA: 0
ABDOMINAL DISTENTION: 0
ABDOMINAL PAIN: 0
EYE PAIN: 0
VOMITING: 0
SORE THROAT: 0
TROUBLE SWALLOWING: 0

## 2020-11-04 ASSESSMENT — PAIN DESCRIPTION - FREQUENCY: FREQUENCY: INTERMITTENT

## 2020-11-04 ASSESSMENT — PAIN DESCRIPTION - LOCATION: LOCATION: LEG

## 2020-11-04 ASSESSMENT — PAIN DESCRIPTION - ORIENTATION: ORIENTATION: RIGHT;LEFT

## 2020-11-04 ASSESSMENT — PAIN DESCRIPTION - DESCRIPTORS: DESCRIPTORS: BURNING

## 2020-11-04 ASSESSMENT — PAIN DESCRIPTION - PAIN TYPE: TYPE: ACUTE PAIN

## 2020-11-04 ASSESSMENT — PAIN SCALES - GENERAL: PAINLEVEL_OUTOF10: 10

## 2020-11-04 ASSESSMENT — PAIN DESCRIPTION - ONSET: ONSET: GRADUAL

## 2020-11-04 NOTE — ED NOTES
Bed: 07  Expected date:   Expected time:   Means of arrival: [facility]  Comments:  Leg pain     Adrianne Paige RN  11/04/20 [id number]

## 2020-11-04 NOTE — ED NOTES
Pt was given cab voucher to go to dialysis,  was called per patient request     Priscilla Murrell, RN  11/04/20 [id number]

## 2020-11-04 NOTE — ED PROVIDER NOTES
[facility]  eMERGENCY dEPARTMENT eNCOUnter      Pt Name: Suyapa Qureshi  MRN: [id number]  [place] 1960  Date of evaluation: 11/4/2020  Provider: Belinda Brothers MD    [address] [place] [address]       Chief Complaint   Patient presents with    Leg Pain     bilateral leg pain for 2 days         HISTORY OF PRESENT ILLNESS   (Location/Symptom, Timing/Onset,Context/Setting, Quality, Duration, Modifying Factors, Severity)  Note limiting factors. Suyapa Qureshi is a 56 y.o. female who presents to the emergency department for bilateral LE pain. She has a hx of neuropathy as well as PVD. Pt states that the pain in her LEs has kept her awake and that she has not been able to sleep in the past two days. She is supposed to be taking Neurontin and has not taken the med. Pt cannot recall how long it has been since she had the med, but thinks it was 3 days ago. Pt has not been able to get into her PCP to get a refill. She describes a numbness and tingling along with cramping in different locations to her LEs. This has been an ongoing issue with her for at least the past month. Pt has seen vascular srgy about the LE pain as well. She tells me that she has declined any intervention. Pt is also on dialysis and missed her tx regimen. She states she was hurting \"too bad\" to get out of bed and come to dialysis. She tells me that she called the dialysis center and they told her to come in today. HPI    NursingNotes were reviewed. REVIEW OF SYSTEMS    (2-9 systems for level 4, 10 or more for level 5)     Review of Systems   Constitutional: Negative for activity change, appetite change, chills, fatigue and fever. HENT: Negative for congestion, ear pain, rhinorrhea, sore throat and trouble swallowing. Eyes: Negative for photophobia, pain and visual disturbance. Respiratory: Negative for cough, chest tightness, shortness of breath and wheezing.     Cardiovascular: Negative for chest pain, palpitations and leg swelling. Gastrointestinal: Negative for abdominal distention, abdominal pain, constipation, diarrhea, nausea and vomiting. Genitourinary: Negative for difficulty urinating, dysuria, flank pain, urgency, vaginal bleeding and vaginal discharge. Musculoskeletal: Positive for myalgias. Negative for back pain and neck stiffness. Skin: Negative for color change, pallor and rash. Neurological: Negative for dizziness, weakness, light-headedness, numbness and headaches. Psychiatric/Behavioral: Negative for agitation, behavioral problems, confusion, hallucinations and suicidal ideas.             PAST MEDICALHISTORY     Past Medical History:   Diagnosis Date    Blind right eye     partial vision loss in the L eye too, due to DM    Blindness of one eye     Right     [facility]est[facility] failure) [facility]     CKD (chronic kidney disease), stage IV ([facility])     secondary to diabetic nephropathy    Diabetes ([facility]     Diabetes mellitus with ophthalmic manifestations     dx'd in 2000 but likely ongoing before that, was dx'd when she went blind in the R eye    Glaucoma     Hemodialysis patient ([facility]     dialysis tues, thurs, sat. [facility]    Hypertension     2000, dx'd at same time as the DM    Obesity     Renal osteodystrophy     Smoker     Type II or unspecified type diabetes mellitus with renal manifestations, uncontrolled(250.42)          SURGICAL HISTORY       Past Surgical History:   Procedure Laterality Date    CARPAL TUNNEL RELEASE      CATARACT REMOVAL     [address], and 1979    CHOLECYSTECTOMY      COLONOSCOPY N/A 3/9/2018    Dr ReevesFtvfmp-Qzbckkbngtunoy-Eqzjkucmqwigc AP (-) dysplasia x 1, tubular AP x  (-) dysplasia x 1, HP x 4--1 yr recall    DIALYSIS FISTULA CREATION Left 4/23/15 [facility]    Left proximal ulnar artery to cephalic vein AVF creation    EYE SURGERY      laser, several times     TYMPANOSTOMY TUBE PLACEMENT Bilateral     VASCULAR SURGERY (ALPHAGAN P) 0.15 % OPHTHALMIC SOLUTION    Place 1 drop into the left eye 3 times daily     CARVEDILOL (COREG) 12.5 MG TABLET    Take 12.5 mg by mouth 2 times daily (with meals)    CHOLECALCIFEROL (VITAMIN D3) 1000 UNITS CAPS    Take by mouth    DICYCLOMINE (BENTYL) 10 MG CAPSULE    Take 1 capsule by mouth 4 times daily    DORZOLAMIDE (TRUSOPT) 2 % OPHTHALMIC SOLUTION    Place 1 drop into the left eye 2 times daily     FAMOTIDINE (PEPCID) 20 MG TABLET    TAKE 1 TABLET BY MOUTH TWICE DAILY    FLUTICASONE (FLONASE) 50 MCG/ACT NASAL SPRAY    2 sprays by Nasal route daily. To keep ears opened up    GLUCOSE BLOOD (BLOOD GLUCOSE TEST STRIPS) STRP    Check blood sugar twice a day for recent medication adjustments. HOMATROPINE 5 % OPHTHALMIC SOLUTION    Place 1 drop into the left eye daily     INSULIN GLARGINE (LANTUS SOLOSTAR) 100 UNIT/ML INJECTION PEN    INJECT 10 UNITS INTO THE SKIN ONCE DAILY    INSULIN SYRINGE-NEEDLE U-100 (ACCUSURE INS SYR 1CC/31GX5/16\") 31G X 5/16\" 1 ML MISC    by Does not apply route. IPRATROPIUM-ALBUTEROL (DUONEB) 0.5-2.5 (3) MG/3ML SOLN NEBULIZER SOLUTION    Inhale 3 mLs into the lungs every 4 hours    LACTULOSE (CHRONULAC) 10 GM/15ML SOLUTION    TAKE 30 ML BY MOUTH THREE TIMES DAILY    LOSARTAN (COZAAR) 50 MG TABLET    TAKE 1 TABLET BY MOUTH TWICE DAILY    MIDODRINE (PROAMATINE) 5 MG TABLET    TK 1 T PO HALF WAY THROUGH TREATMENT AS DIRECTED    ONDANSETRON (ZOFRAN) 4 MG TABLET    Take 4 mg by mouth every 6 hours    PRAVASTATIN (PRAVACHOL) 10 MG TABLET    TAKE 1 TABLET BY MOUTH EVERY NIGHT AT BEDTIME    SEVELAMER (RENVELA) 800 MG TABLET    Take 1 tablet by mouth daily     TIMOLOL (TIMOPTIC-XR) 0.5 % OPHTHALMIC GEL-FORMING    Place 1 drop into the left eye 2 times daily     TRAVOPROST, ALLISON FREE, (TRAVATAN Z) 0.004 % SOLN OPHTHALMIC SOLUTION    1 drop nightly       ALLERGIES     Bupropion; Sulfa antibiotics; Varenicline; Wellbutrin [bupropion hcl]; Azithromycin;  Levaquin [levofloxacin]; and Penicillins    FAMILY HISTORY       Family History   Problem Relation Age of Onset    Heart Disease Mother     Heart Attack Mother     Glaucoma Sister     Diabetes Sister     Diabetes Brother     Kidney Disease Brother     Blindness Brother     Stroke Maternal Grandmother     Stroke Maternal Grandfather     Colon Cancer Neg Hx     Colon Polyps Neg Hx     Liver Cancer Neg Hx     Liver Disease Neg Hx     Esophageal Cancer Neg Hx     Rectal Cancer Neg Hx     Stomach Cancer Neg Hx           SOCIAL HISTORY       Social History     Socioeconomic History    Marital status:      Spouse name: None    Number of children: None    Years of education: None    Highest education level: None   Occupational History    None   Social Needs    Financial resource strain: None    Food insecurity     Worry: None     Inability: None    Transportation needs     Medical: None     Non-medical: None   Tobacco Use    Smoking status: Current Every Day Smoker     Packs/day: 1.00     Years: 40.00     Pack years: 40.00    Smokeless tobacco: Never Used   Substance and Sexual Activity    Alcohol use: No     Alcohol/week: 0.0 standard drinks    Drug use: Yes     Types: Marijuana    Sexual activity: Not Currently     Partners: Male   Lifestyle    Physical activity     Days per week: None     Minutes per session: None    Stress: None   Relationships    Social connections     Talks on phone: None     Gets together: None     Attends [employer] service: None     Active member of club or organization: None     Attends meetings of clubs or organizations: None     Relationship status: None    Intimate partner violence     Fear of current or ex partner: None     Emotionally abused: None     Physically abused: None     Forced sexual activity: None   Other Topics Concern    None   Social History Narrative    None       SCREENINGS    [place] Coma Scale  Eye Opening: Spontaneous  Best Verbal Response: Oriented  Best Motor Response: Obeys commands  [place] Coma Scale Score: 15        PHYSICAL EXAM    (up to 7 for level 4, 8 or more for level 5)     ED Triage Vitals   BP Temp Temp src Pulse Resp SpO2 Height Weight   -- -- -- -- -- -- -- --       Physical Exam  Vitals signs and nursing note reviewed. Constitutional:       Appearance: Normal appearance. She is not ill-appearing. HENT:      Head: Normocephalic and atraumatic. Nose: Nose normal.      Mouth/Throat:      Mouth: Mucous membranes are moist.   Eyes:      Extraocular Movements: Extraocular movements intact. Pupils: Pupils are equal, round, and reactive to light. Cardiovascular:      Rate and Rhythm: Normal rate and regular rhythm. Heart sounds: No murmur. Pulmonary:      Effort: Pulmonary effort is normal.      Breath sounds: No wheezing, rhonchi or rales. Abdominal:      General: Abdomen is flat. There is no distension. Palpations: Abdomen is soft. Tenderness: There is no abdominal tenderness. Musculoskeletal: Normal range of motion. General: Tenderness present. No swelling or deformity. Comments: Patient has lightly palpable DP pulses. Cap refill was slightly delayed. Range of motion is intact. I do not appreciate any injuries. Skin:     General: Skin is warm and dry. Capillary Refill: Capillary refill takes 2 to 3 seconds. Neurological:      General: No focal deficit present. Mental Status: She is alert and oriented to person, place, and time. Cranial Nerves: No cranial nerve deficit. Psychiatric:         Mood and Affect: Mood is anxious.          Speech: Speech normal.         Behavior: Behavior normal.         DIAGNOSTIC RESULTS     EKG: All EKG's areinterpreted by the Emergency Department Physician who either signs or Co-signs this chart in the absence of a cardiologist.        RADIOLOGY:  Non-plain film images such as CT, Ultrasound and MRI are read by the radiologist. Plain radiographic images are visualized and preliminarily interpreted bythe emergency physician with the below findings:          No orders to display           LABS:  Labs Reviewed - No data to display    All other labs were within normal range or not returned as of this dictation. EMERGENCY DEPARTMENT COURSE and DIFFERENTIAL DIAGNOSIS/MDM:   Vitals:    Vitals:    11/04/20 0414   BP: 138/64   Pulse: 75   Resp: 18   TempSrc: Oral   SpO2: 95%   Weight: 160 lb (72.6 kg)   Height: 4' 10\" (1.473 m)       MDM  Number of Diagnoses or Management Options  Neuropathy: new and requires workup  Diagnosis management comments: Patient has known diabetic neuropathy and peripheral vascular disease. She tends to be noncompliant with medications and dialysis. Patient told me that \"Lyrica\" helps with her pain. Looking back at her notes she was on Neurontin, but I cannot figure out why they took her off the Neurontin during her last visit. I am going to start patient back on the Neurontin. She tells me she cannot get into her primary care to get a prescription refill. Patient also tells me that she was told to come in today for dialysis. I think there may be some malingering component to her arrival this evening. I am not sure if she did not have a ride to get to dialysis today. I am going to get her a cab so that she can make her dialysis appointment. Otherwise, as for her neuropathy, there is not much I can do. She has been evaluated by vascular surgery. She is been on Neurontin for the pain. At this point it does not appear that she has had any worsening peripheral vascular disease. Pulses are present, albeit weak, but they are there. She also does not want anything done by vascular anyway. I do not know what else I can offer at this time. I will discharge her so that she can make her dialysis appointment.     Patient Progress  Patient progress: stable      Reassessment      CONSULTS:  None    PROCEDURES:  Unless otherwise noted below, none     Procedures    FINAL IMPRESSION      1. Neuropathy          DISPOSITION/PLAN   DISPOSITION Decision To Discharge 11/04/2020 04:57:34 AM      PATIENT REFERRED TO:  Silvia Luther[address]  [phone]    Call in 2 days  For follow up      DISCHARGE MEDICATIONS:  New Prescriptions    GABAPENTIN (NEURONTIN) 100 MG CAPSULE    Take 1 capsule by mouth daily for 180 days.  Intended supply: 90 days          (Please note that portions of this note were completed with a voice recognition program.  Efforts were made to edit thedictations but occasionally words are mis-transcribed.)    Cynthia Morales MD (electronically signed)  Attending Emergency Physician          Tulio Metcalf MD  11/04/20 4243

## 2020-11-05 ENCOUNTER — CARE COORDINATION (OUTPATIENT)
Dept: CARE COORDINATION | Age: 60
End: 2020-11-05

## 2020-11-05 NOTE — CARE COORDINATION
Ambulatory Care Coordination Note  11/5/2020  CM Risk Score: 14  [place] 10 Year Mortality Risk Score: 100%     ACC: Agnes Melendez, RN    Summary Note: ACM called to speak with patient for ER Follow Up/COVID Monitoring call. ACM called patient and patient's friend Domingo Flores answered and stated that patient was in dialysis and asked to take a message. ACM explained that she was calling from [facility] ([facility] to see how Nicolette Faye is feeling today. Domingo Flores states that she was in the ER for bilateral leg  pain and she is feeling better, however he states that she cannot take the Gabapentin that she was given in the ER and she would like to try Lyrica. ACM will call patient this afternoon when she returns from dialysis to discuss medications and complete COVID monitoring call. ACM called back to verify when patient will be home from dialysis. [place] states that she will be home at 5:00- 5:30 PM.    Anatoly Cummins, [address]            Prior to Admission medications    Medication Sig Start Date End Date Taking? Authorizing Provider   gabapentin (NEURONTIN) 100 MG capsule Take 1 capsule by mouth daily for 180 days.  Intended supply: 90 days 11/4/20 5/3/21  Kirti Samano MD   albuterol sulfate HFA (PROVENTIL HFA) 108 (90 Base) MCG/ACT inhaler Inhale 2 puffs into the lungs every 6 hours as needed for Wheezing 10/16/20   RAMONA Christensen - NP   losartan (COZAAR) 50 MG tablet TAKE 1 TABLET BY MOUTH TWICE DAILY 9/25/20   RAMONA Clark   famotidine (PEPCID) 20 MG tablet TAKE 1 TABLET BY MOUTH TWICE DAILY 8/10/20   RAMONA Clark   insulin glargine (LANTUS SOLOSTAR) 100 UNIT/ML injection pen INJECT 10 UNITS INTO THE SKIN ONCE DAILY 7/4/20   RAMONA Clark   pravastatin (PRAVACHOL) 10 MG tablet TAKE 1 TABLET BY MOUTH EVERY NIGHT AT BEDTIME 6/29/20   RAMONA Clark   ondansetron (ZOFRAN) 4 MG tablet Take 4 mg by mouth every 6 hours    Historical Provider, MD   ipratropium-albuterol (DUONEB) 0.5-2.5 (3) MG/3ML SOLN nebulizer solution Inhale 3 mLs into the lungs every 4 hours 5/20/20   RAMONA Carpio   dicyclomine (BENTYL) 10 MG capsule Take 1 capsule by mouth 4 times daily 4/14/20   RAMONA Carpio   B-D ULTRAFINE III SHORT PEN 31G X 8 MM MISC USE ONCE DAILY TO INJECT INSULIN DAILY 3/16/20   RAMONA Carpio   carvedilol (COREG) 12.5 MG tablet Take 12.5 mg by mouth 2 times daily (with meals)    Historical Provider, MD   lactulose (CHRONULAC) 10 GM/15ML solution TAKE 30 ML BY MOUTH THREE TIMES DAILY 9/25/19   RAMONA Carpio   Cholecalciferol (VITAMIN D3) 1000 units CAPS Take by mouth    Historical Provider, MD   blood glucose monitor kit and supplies 1 kit by Other route 3 times daily Test three times a day & as needed for symptoms of irregular blood glucose. 7/11/18 10/16/20  Arthurine Patient, RAMONA   Travoprost, BAK Free, (TRAVATAN Z) 0.004 % SOLN ophthalmic solution 1 drop nightly    Historical Provider, MD   midodrine (PROAMATINE) 5 MG tablet TK 1 T PO HALF WAY THROUGH TREATMENT AS DIRECTED 10/17/17   Historical Provider, MD   sevelamer (RENVELA) 800 MG tablet Take 1 tablet by mouth daily     Historical Provider, MD   aspirin EC 81 MG EC tablet Take 81 mg by mouth daily    Historical Provider, MD   fluticasone (FLONASE) 50 MCG/ACT nasal spray 2 sprays by Nasal route daily. To keep ears opened up 3/27/14   Estee Caceres MD   Blood Glucose Monitoring Suppl NIKOS by Does not apply route. Pt will also need new lancet device if not included in kit. 12/9/13   Estee Caceres MD   Glucose Blood (BLOOD GLUCOSE TEST STRIPS) STRP Check blood sugar twice a day for recent medication adjustments. 12/9/13   Estee Caceres MD   bimatoprost 0.01 % SOLN Apply 1 drop to eye nightly. One drop in left eye at hs    Historical Provider, MD   Insulin Syringe-Needle U-100 (ACCUSURE INS SYR 1CC/31GX5/16\") 31G X 5/16\" 1 ML MISC by Does not apply route.  9/25/12   Estee Caceres MD   timolol (TIMOPTIC-XR) 0.5 %

## 2020-11-05 NOTE — CARE COORDINATION
Patient contacted regarding recent discharge and COVID-19 risk. Discussed COVID-19 related testing which was not done at this time. Test results were not done. Care Transition Nurse/ Ambulatory Care Manager contacted the patient by telephone to perform post discharge assessment. Verified name and  with patient as identifiers. Patient has following risk factors of: heart failure and diabetes. CTN/ACM reviewed discharge instructions, medical action plan and red flags related to discharge diagnosis. Reviewed and educated them on any new and changed medications related to discharge diagnosis. Advised obtaining a 90-day supply of all daily and as-needed medications. Education provided regarding infection prevention, and signs and symptoms of COVID-19 and when to seek medical attention with patient who verbalized understanding. Discussed exposure protocols and quarantine from [address] you at higher risk for severe illness  and given an opportunity for questions and concerns. The patient agrees to contact PCP office for questions related to their healthcare. CTN/ACM provided contact information for future reference. From CDC: Are you at higher risk for severe illness?  Wash your hands often.  Avoid close contact (6 feet, which is about two arm lengths) with people who are sick.  Put distance between yourself and other people if COVID-19 is spreading in your community.  Clean and disinfect frequently touched surfaces.  Avoid all cruise travel and non-essential air travel.  Call your healthcare professional if you have concerns about COVID-19 and your underlying condition or if you are sick. For more information on steps you can take to protect yourself, see CDC's How to John for follow-up call in 7-14 days based on severity of symptoms and risk factors. Patient states that her pain has improved.   Patient states that she did not get Gabapentin filled because last time she took it she had hallucinations and was irritable. Patient is requesting to have her Gabapentin changed to Lyrica. Patient has appointment with PCP on 11/11/20. Patient denies fever, cough, or shortness of breath. Patient agrees to call PCP if experiencing new or worsening symptoms or will call 911 for severe or life threatening symptoms. Patient agrees to follow COVID-19 precautions.

## 2020-11-06 ENCOUNTER — CARE COORDINATION (OUTPATIENT)
Dept: CARE COORDINATION | Age: 60
End: 2020-11-06

## 2020-11-06 RX ORDER — PREGABALIN 75 MG/1
75 CAPSULE ORAL DAILY
Qty: 30 CAPSULE | Refills: 0 | Status: SHIPPED | OUTPATIENT
Start: 2020-11-06 | End: 2020-11-09 | Stop reason: SDUPTHER

## 2020-11-06 NOTE — CARE COORDINATION
3/16/20   RAMONA Torres   carvedilol (COREG) 12.5 MG tablet Take 12.5 mg by mouth 2 times daily (with meals)    Historical Provider, MD   lactulose (CHRONULAC) 10 GM/15ML solution TAKE 30 ML BY MOUTH THREE TIMES DAILY 9/25/19   RAMONA Torres   Cholecalciferol (VITAMIN D3) 1000 units CAPS Take by mouth    Historical Provider, MD   blood glucose monitor kit and supplies 1 kit by Other route 3 times daily Test three times a day & as needed for symptoms of irregular blood glucose. 7/11/18 10/16/20  RAMONA Gonzalez   Travoprost, BAK Free, (TRAVATAN Z) 0.004 % SOLN ophthalmic solution 1 drop nightly    Historical Provider, MD   midodrine (PROAMATINE) 5 MG tablet TK 1 T PO HALF WAY THROUGH TREATMENT AS DIRECTED 10/17/17   Historical Provider, MD   sevelamer (RENVELA) 800 MG tablet Take 1 tablet by mouth daily     Historical Provider, MD   aspirin EC 81 MG EC tablet Take 81 mg by mouth daily    Historical Provider, MD   fluticasone (FLONASE) 50 MCG/ACT nasal spray 2 sprays by Nasal route daily. To keep ears opened up 3/27/14   Cesar Munroe MD   Blood Glucose Monitoring Suppl NIKOS by Does not apply route. Pt will also need new lancet device if not included in kit. 12/9/13   Cesar Munroe MD   Glucose Blood (BLOOD GLUCOSE TEST STRIPS) STRP Check blood sugar twice a day for recent medication adjustments. 12/9/13   Cesar Munroe MD   bimatoprost 0.01 % SOLN Apply 1 drop to eye nightly. One drop in left eye at     Historical Provider, MD   Insulin Syringe-Needle U-100 (ACCUSURE INS SYR 1CC/31GX5/16\") 31G X 5/16\" 1 ML MISC by Does not apply route.  9/25/12   Cesar Munroe MD   timolol (TIMOPTIC-XR) 0.5 % ophthalmic gel-forming Place 1 drop into the left eye 2 times daily  7/3/12   Historical Provider, MD   dorzolamide (TRUSOPT) 2 % ophthalmic solution Place 1 drop into the left eye 2 times daily     Historical Provider, MD   brimonidine (ALPHAGAN P) 0.15 % ophthalmic solution Place 1 drop into the left eye 3 times daily     Historical Provider, MD   homatropine 5 % ophthalmic solution Place 1 drop into the left eye daily     Historical Provider, MD       Future Appointments   Date Time Provider Darrell rPice   11/11/2020 10:45 AM RAMONA Clark [address]

## 2020-11-09 RX ORDER — PREGABALIN 75 MG/1
75 CAPSULE ORAL DAILY
Qty: 30 CAPSULE | Refills: 0 | Status: SHIPPED | OUTPATIENT
Start: 2020-11-09 | End: 2020-11-11 | Stop reason: SDUPTHER

## 2020-11-09 NOTE — TELEPHONE ENCOUNTER
[facility] called to request a refill on her medication. Last office visit : 10/16/2020   Next office visit : 11/11/2020     Requested Prescriptions     Pending Prescriptions Disp Refills    pregabalin (LYRICA) 75 MG capsule 30 capsule 0     Sig: Take 1 capsule by mouth daily for 30 days.             Barbi Bryson

## 2020-11-11 ENCOUNTER — CARE COORDINATION (OUTPATIENT)
Dept: CARE COORDINATION | Age: 60
End: 2020-11-11

## 2020-11-11 ENCOUNTER — OFFICE VISIT (OUTPATIENT)
Dept: PRIMARY CARE CLINIC | Age: 60
End: 2020-11-11
Payer: MEDICARE

## 2020-11-11 VITALS
DIASTOLIC BLOOD PRESSURE: 84 MMHG | BODY MASS INDEX: 29.64 KG/M2 | TEMPERATURE: 97.4 F | HEIGHT: 59 IN | SYSTOLIC BLOOD PRESSURE: 132 MMHG | OXYGEN SATURATION: 97 % | WEIGHT: 147 LBS | HEART RATE: 74 BPM | RESPIRATION RATE: 18 BRPM

## 2020-11-11 PROCEDURE — 99214 OFFICE O/P EST MOD 30 MIN: CPT | Performed by: NURSE PRACTITIONER

## 2020-11-11 PROCEDURE — G8484 FLU IMMUNIZE NO ADMIN: HCPCS | Performed by: NURSE PRACTITIONER

## 2020-11-11 PROCEDURE — G8417 CALC BMI ABV UP PARAM F/U: HCPCS | Performed by: NURSE PRACTITIONER

## 2020-11-11 PROCEDURE — G8427 DOCREV CUR MEDS BY ELIG CLIN: HCPCS | Performed by: NURSE PRACTITIONER

## 2020-11-11 PROCEDURE — 4004F PT TOBACCO SCREEN RCVD TLK: CPT | Performed by: NURSE PRACTITIONER

## 2020-11-11 PROCEDURE — 1111F DSCHRG MED/CURRENT MED MERGE: CPT | Performed by: NURSE PRACTITIONER

## 2020-11-11 PROCEDURE — 3017F COLORECTAL CA SCREEN DOC REV: CPT | Performed by: NURSE PRACTITIONER

## 2020-11-11 RX ORDER — PREGABALIN 50 MG/1
50 CAPSULE ORAL 2 TIMES DAILY
Qty: 60 CAPSULE | Refills: 1 | Status: SHIPPED | OUTPATIENT
Start: 2020-11-11 | End: 2021-01-12 | Stop reason: SDUPTHER

## 2020-11-11 ASSESSMENT — ENCOUNTER SYMPTOMS
RESPIRATORY NEGATIVE: 1
GASTROINTESTINAL NEGATIVE: 1
EYES NEGATIVE: 1

## 2020-11-11 NOTE — PROGRESS NOTES
[address] [facility]  [id number] [facility] [address] [place] [id number]  Dept: [phone]  Dept Fax: [phone]  Loc: [phone]    Evangelina Sanchez is a 56 y.o. female who presents today for her medical conditions/complaints as noted below. Evangelina Sanchez is c/o of Follow-Up from Hospital (ED follow up pt went to ED due to KHADAR leg pain was started on gabapentin pt cannot take due to reaction to it. Lyrica was sent in and pt state it is working great but on the days she does dialysis she does need to have 2 pills instead of 1 daily. )      Chief Complaint   Patient presents with   [address] from Hospital     ED follow up pt went to ED due to KHADAR leg pain was started on gabapentin pt cannot take due to reaction to it. Lyrica was sent in and pt state it is working great but on the days she does dialysis she does need to have 2 pills instead of 1 daily. HPI:     HPI  Patient is here for follow up from recent ER visit due to leg pain. She was restarted on gabapentin for neuropathy, but she reports having issues with gabapentin and is unable to take this medication. We switched her to Lyrica and she states that this medicaiton has helped her pain tremendously.      Past Medical History:   Diagnosis Date    Blind right eye     partial vision loss in the L eye too, due to DM    Blindness of one eye     Right     [facility]est[facility]     CKD (chronic kidney disease), stage IV ([facility])     secondary to diabetic nephropathy    [facility]     Diabetes mellitus with ophthalmic manifestations     dx'd in 2000 but likely ongoing before that, was dx'd when she went blind in the R eye    Glaucoma     [facility]od[facility]     dialysis tues, thurs, [address], [facility]    Hypertension     2000, dx'd at same time as the DM    Obesity     Renal osteodystrophy     Smoker     Type II or unspecified type diabetes mellitus with renal manifestations, uncontrolled(250.42)         Past Surgical History:   Procedure Laterality Date    CARPAL TUNNEL RELEASE      CATARACT REMOVAL       SECTION      , , and     CHOLECYSTECTOMY      COLONOSCOPY N/A 3/9/2018    Dr ReevesKujval-Skdgwpzmikuigz-Defjhrbzpfxqb AP (-) dysplasia x 1, tubular AP x  (-) dysplasia x 1, HP x 4--1 yr recall    DIALYSIS FISTULA CREATION Left 4/23/15 [facility]    Left proximal ulnar artery to cephalic vein AVF creation    EYE SURGERY      laser, several times     TYMPANOSTOMY TUBE PLACEMENT Bilateral     VASCULAR SURGERY Left 5/11/15 [facility]    US-guided cannulation left distal upper arm cephalic vein with 4-Sri Lankan glide sheath; LUE AV f'grams with venography SVC; left cephalic vein BAM with [id number] julieth balloon; completion venogram [facility]    VASCULAR SURGERY Left 5/28/15 [facility]    Percutaneous cannulation left distal radial artery with 4-Sri [place]ankan glide sheath; LUE AV f'grams with venography SVC; left cephalic vein balloon a'plasty with [id number] julieth balloon and [id number] julieth balloon; proximal and mid upper arm cephalic vein BAM with [id number] julieth balloon; completion venogram [facility]    VASCULAR SURGERY Left 6/15/15 [facility]    US-guided cannulation left cephalic vein with 4-Sri Lankan and later 7-Sri Lankan sheath; LUE AV f'grams including venography SVC; left cephalic vein stenosis balloon a'plasty with [id number] cutting balloon; completion f'gram and venogram [facility]    VASCULAR SURGERY  05/15/2017    [facility]. Left upper fistulograms/venograms.  VASCULAR SURGERY  2018    [facility]. Left upper fistulograms, left subclavian BA 12x40 atlas, left cephalic arch BA [id number] conquest.    VASCULAR SURGERY  2019    S. Left upper fistulograms,BA left SCV 8x40 conquest,stent left SCV, viabahn 13x50,left SCV stent BA 12x40 conquest.       Social History     Tobacco Use    Smoking status: Current Every Day Smoker     Packs/day: 1.00     Years: 40.00     Pack years: 40.00    Smokeless tobacco: Never Used   Substance Use Topics    Alcohol use: No     Alcohol/week: 0.0 standard drinks        Current Outpatient Medications   Medication Sig Dispense Refill    pregabalin (LYRICA) 50 MG capsule Take 1 capsule by mouth 2 times daily for 30 days. 60 capsule 1    albuterol sulfate HFA (PROVENTIL HFA) 108 (90 Base) MCG/ACT inhaler Inhale 2 puffs into the lungs every 6 hours as needed for Wheezing 1 Inhaler 3    losartan (COZAAR) 50 MG tablet TAKE 1 TABLET BY MOUTH TWICE DAILY 180 tablet 0    famotidine (PEPCID) 20 MG tablet TAKE 1 TABLET BY MOUTH TWICE DAILY 60 tablet 11    insulin glargine (LANTUS SOLOSTAR) 100 UNIT/ML injection pen INJECT 10 UNITS INTO THE SKIN ONCE DAILY 15 mL 1    pravastatin (PRAVACHOL) 10 MG tablet TAKE 1 TABLET BY MOUTH EVERY NIGHT AT BEDTIME 30 tablet 11    ondansetron (ZOFRAN) 4 MG tablet Take 4 mg by mouth every 6 hours      ipratropium-albuterol (DUONEB) 0.5-2.5 (3) MG/3ML SOLN nebulizer solution Inhale 3 mLs into the lungs every 4 hours 360 mL 1    dicyclomine (BENTYL) 10 MG capsule Take 1 capsule by mouth 4 times daily 120 capsule 5    B-D ULTRAFINE III SHORT PEN 31G X 8 MM MISC USE ONCE DAILY TO INJECT INSULIN DAILY 100 each 3    carvedilol (COREG) 12.5 MG tablet Take 12.5 mg by mouth 2 times daily (with meals)      lactulose (CHRONULAC) 10 GM/15ML solution TAKE 30 ML BY MOUTH THREE TIMES DAILY 8108 mL 1    Cholecalciferol (VITAMIN D3) 1000 units CAPS Take by mouth      Travoprost, BAK Free, (TRAVATAN Z) 0.004 % SOLN ophthalmic solution 1 drop nightly      midodrine (PROAMATINE) 5 MG tablet TK 1 T PO HALF WAY THROUGH TREATMENT AS DIRECTED      sevelamer (RENVELA) 800 MG tablet Take 1 tablet by mouth daily       aspirin EC 81 MG EC tablet Take 81 mg by mouth daily      fluticasone (FLONASE) 50 MCG/ACT nasal spray 2 sprays by Nasal route daily. To keep ears opened up 1 Bottle 11    Blood Glucose Monitoring Suppl NIKOS by Does not apply route.  Pt will also need new lancet device if not included in kit. 1 Device 0    Glucose Blood (BLOOD GLUCOSE TEST STRIPS) STRP Check blood sugar twice a day for recent medication adjustments. 100 strip 11    bimatoprost 0.01 % SOLN Apply 1 drop to eye nightly. One drop in left eye at hs      Insulin Syringe-Needle U-100 (ACCUSURE INS SYR 1CC/31GX5/16\") 31G X 5/16\" 1 ML MISC by Does not apply route. 100 each 3    timolol (TIMOPTIC-XR) 0.5 % ophthalmic gel-forming Place 1 drop into the left eye 2 times daily       dorzolamide (TRUSOPT) 2 % ophthalmic solution Place 1 drop into the left eye 2 times daily       brimonidine (ALPHAGAN P) 0.15 % ophthalmic solution Place 1 drop into the left eye 3 times daily       homatropine 5 % ophthalmic solution Place 1 drop into the left eye daily       blood glucose monitor kit and supplies 1 kit by Other route 3 times daily Test three times a day & as needed for symptoms of irregular blood glucose. 1 kit 0     No current facility-administered medications for this visit. Allergies   Allergen Reactions    Bupropion Nausea Only    Gabapentin      hallucinations    Sulfa Antibiotics      Causes hypoglycemia     Varenicline     Wellbutrin [Bupropion Hcl] Nausea Only    Azithromycin Rash    Levaquin [Levofloxacin] Rash    Penicillins Rash       Family History   Problem Relation Age of Onset    Heart Disease Mother     Heart Attack Mother     Glaucoma Sister     Diabetes Sister     Diabetes Brother     Kidney Disease Brother     Blindness Brother     Stroke Maternal Grandmother     Stroke Maternal Grandfather     Colon Cancer Neg Hx     Colon Polyps Neg Hx     Liver Cancer Neg Hx     Liver Disease Neg Hx     Esophageal Cancer Neg Hx     Rectal Cancer Neg Hx     Stomach Cancer Neg Hx                Subjective:      Review of Systems   Constitutional: Negative. HENT: Negative. Eyes: Negative. Respiratory: Negative. Cardiovascular: Negative. Gastrointestinal: Negative. Endocrine: Negative. Genitourinary: Negative. Musculoskeletal: Positive for arthralgias (leg pain). Skin: Negative. Neurological: Negative. Hematological: Negative. Psychiatric/Behavioral: Negative. Objective:     Physical Exam  Vitals signs and nursing note reviewed. Constitutional:       General: She is not in acute distress. Appearance: Normal appearance. She is well-developed. She is not diaphoretic. HENT:      Head: Normocephalic. Right Ear: External ear normal.      Left Ear: External ear normal.      Nose: Nose normal.      Mouth/Throat:      Mouth: Mucous membranes are moist.      Pharynx: Oropharynx is clear. No oropharyngeal exudate. Eyes:      General:         Right eye: No discharge. Left eye: No discharge. Conjunctiva/sclera: Conjunctivae normal.      Pupils: Pupils are equal, round, and reactive to light. Neck:      Musculoskeletal: Normal range of motion. Trachea: No tracheal deviation. Cardiovascular:      Rate and Rhythm: Normal rate and regular rhythm. Pulses: Normal pulses. Heart sounds: Murmur present. Pulmonary:      Effort: Pulmonary effort is normal. No respiratory distress. Breath sounds: No stridor. Wheezing (intermittent bilateral) present. Abdominal:      General: Bowel sounds are normal.      Palpations: Abdomen is soft. Tenderness: There is no abdominal tenderness. Musculoskeletal: Normal range of motion. General: No tenderness or deformity. Comments: wheelchair   Lymphadenopathy:      Cervical: No cervical adenopathy. Skin:     General: Skin is warm and dry. Capillary Refill: Capillary refill takes less than 2 seconds. Findings: No rash. Neurological:      Mental Status: She is alert and oriented to person, place, and time. Cranial Nerves: No cranial nerve deficit.    Psychiatric:         Mood and Affect: Mood normal.         Behavior: may still exist.    Electronically signed by RAMONA Elaine on 11/11/2020 at 1:24 PM

## 2020-11-11 NOTE — CARE COORDINATION
Date/Time:  11/11/2020 2:40 PM  Attempted to reach patient by telephone. Left HIPPA compliant message requesting a return call.

## 2020-11-24 ENCOUNTER — OFFICE VISIT (OUTPATIENT)
Dept: PRIMARY CARE CLINIC | Age: 60
End: 2020-11-24
Payer: MEDICARE

## 2020-11-24 PROCEDURE — G8484 FLU IMMUNIZE NO ADMIN: HCPCS | Performed by: NURSE PRACTITIONER

## 2020-11-24 PROCEDURE — 99497 ADVNCD CARE PLAN 30 MIN: CPT | Performed by: NURSE PRACTITIONER

## 2020-11-24 PROCEDURE — G0439 PPPS, SUBSEQ VISIT: HCPCS | Performed by: NURSE PRACTITIONER

## 2020-11-24 PROCEDURE — 99406 BEHAV CHNG SMOKING 3-10 MIN: CPT | Performed by: NURSE PRACTITIONER

## 2020-11-24 PROCEDURE — 3017F COLORECTAL CA SCREEN DOC REV: CPT | Performed by: NURSE PRACTITIONER

## 2020-11-24 ASSESSMENT — PATIENT HEALTH QUESTIONNAIRE - PHQ9
SUM OF ALL RESPONSES TO PHQ QUESTIONS 1-9: 0
SUM OF ALL RESPONSES TO PHQ QUESTIONS 1-9: 0
2. FEELING DOWN, DEPRESSED OR HOPELESS: 0
SUM OF ALL RESPONSES TO PHQ QUESTIONS 1-9: 0
SUM OF ALL RESPONSES TO PHQ9 QUESTIONS 1 & 2: 0
1. LITTLE INTEREST OR PLEASURE IN DOING THINGS: 0

## 2020-11-24 ASSESSMENT — LIFESTYLE VARIABLES: HOW OFTEN DO YOU HAVE A DRINK CONTAINING ALCOHOL: 0

## 2020-11-24 NOTE — PATIENT INSTRUCTIONS
Advance Directives: Care Instructions  Overview  An advance directive is a legal way to state your wishes at the end of your life. It tells your family and your doctor what to do if you can't say what you want. There are two main types of advance directives. You can change them any time your wishes change. Living will. This form tells your family and your doctor your wishes about life support and other treatment. The form is also called a declaration. Medical power of . This form lets you name a person to make treatment decisions for you when you can't speak for yourself. This person is called a health care agent (health care proxy, health care surrogate). The form is also called a durable power of  for health care. If you do not have an advance directive, decisions about your medical care may be made by a family member, or by a doctor or a  who doesn't know you. It may help to think of an advance directive as a gift to the people who care for you. If you have one, they won't have to make tough decisions by themselves. Follow-up care is a key part of your treatment and safety. Be sure to make and go to all appointments, and call your doctor if you are having problems. It's also a good idea to know your test results and keep a list of the medicines you take. What should you include in an advance directive? Many states have a unique advance directive form. (It may ask you to address specific issues.) Or you might use a universal form that's approved by many states. If your form doesn't tell you what to address, it may be hard to know what to include in your advance directive. Use the questions below to help you get started. · Who do you want to make decisions about your medical care if you are not able to? · What life-support measures do you want if you have a serious illness that gets worse over time or can't be cured? · What are you most afraid of that might happen? (Maybe you're afraid of having pain, losing your independence, or being kept alive by machines.)  · Where would you prefer to die? (Your home? A hospital? A nursing home?)  · Do you want to donate your organs when you die? · Do you want certain Jew practices performed before you die? When should you call for help? Be sure to contact your doctor if you have any questions. Where can you learn more? Go to https://[email or web address].[email or web address] org and sign in to your Cinchcast account. Enter R264 in the High Cloud Security box to learn more about \"Advance Directives: Care Instructions. \"     If you do not have an account, please click on the \"Sign Up Now\" link. Current as of: December 9, 2019               Content Version: 12.6  © 9924-6960 [employer]. Care instructions adapted under license by [facility] ([facility]). If you have questions about a medical condition or this instruction, always ask your healthcare professional. [place] [address] any warranty or liability for your use of this information. Learning About Living Perroy  What is a living will? A living will, also called a declaration, is a legal form. It tells your family and your doctor your wishes when you can't speak for yourself. It's used by the health professionals who will treat you as you near the end of your life or if you get seriously hurt or ill. If you put your wishes in writing, your loved ones and others will know what kind of care you want. They won't need to guess. This can ease your mind and be helpful to others. And you can change or cancel your living will at any time. A living will is not the same as an estate or property will. An estate will explains what you want to happen with your money and property after you die. How do you use it? A living will is used to describe the kinds of treatment or life support you want as you near the end of your life or if you get seriously hurt or ill. your living will notarized. This means using a person called a  to watch two people sign, or witness, your living will. What should you know when you create a living will? Here are some questions to ask yourself as you make your living will:  · Do you know enough about life support methods that might be used? If not, talk to your doctor so you know what might be done if you can't breathe on your own, your heart stops, or you can't swallow. · What things would you still want to be able to do after you receive life-support methods? Would you want to be able to walk? To speak? To eat on your own? To live without the help of machines? · Do you want certain Zoroastrian practices performed if you become very ill? · If you have a choice, where do you want to be cared for? In your home? At a hospital or nursing home? · If you have a choice at the end of your life, where would you prefer to die? At home? In a hospital or nursing home? Somewhere else? · Would you prefer to be buried or cremated? · Do you want your organs to be donated after you die? What should you do with your living will? · Make sure that your family members and your health care agent have copies of your living will (also called a declaration). · Give your doctor a copy of your living will. Ask him or her to keep it as part of your medical record. If you have more than one doctor, make sure that each one has a copy. · Put a copy of your living will where it can be easily found. For example, some people may put a copy on their refrigerator door. If you are using a digital copy, be sure your doctor, family members, and health care agent know how to find and access it. Where can you learn more? Go to https://chpeericeweb.[email or web address] org and sign in to your [facility] account. Enter Z073 in the Chemo Beanies box to learn more about \"Learning About Living Kevyn Martins. \"     If you do not have an account, please click on the \"Sign Up Now\" link. Current as of: December 9, 2019               Content Version: 12.6  © 6369-8221 [employer], [employer]. Care instructions adapted under license by [facility] ([facility]). If you have questions about a medical condition or this instruction, always ask your healthcare professional. [address] any warranty or liability for your use of this information. Stopping Smoking: Care Instructions  Your Care Instructions     Cigarette smokers crave the nicotine in cigarettes. Giving it up is much harder than simply changing a habit. Your body has to stop craving the nicotine. It is hard to quit, but you can do it. There are many tools that people use to quit smoking. You may find that combining tools works best for you. There are several steps to quitting. First you get ready to quit. Then you get support to help you. After that, you learn new skills and behaviors to become a nonsmoker. For many people, a necessary step is getting and using medicine. Your doctor will help you set up the plan that best meets your needs. You may want to attend a smoking cessation program to help you quit smoking. When you choose a program, look for one that has proven success. Ask your doctor for ideas. You will greatly increase your chances of success if you take medicine as well as get counseling or join a cessation program.  Some of the changes you feel when you first quit tobacco are uncomfortable. Your body will miss the nicotine at first, and you may feel short-tempered and grumpy. You may have trouble sleeping or concentrating. Medicine can help you deal with these symptoms. You may struggle with changing your smoking habits and rituals. The last step is the tricky one: Be prepared for the smoking urge to continue for a time. This is a lot to deal with, but keep at it. You will feel better. Follow-up care is a key part of your treatment and safety.  Be sure to make and go to all appointments, and call your doctor if you are having problems. It's also a good idea to know your test results and keep a list of the medicines you take. How can you care for yourself at home? · Ask your family, friends, and coworkers for support. You have a better chance of quitting if you have help and support. · Join a support group, such as Nicotine Anonymous, for people who are trying to quit smoking. · Consider signing up for a smoking cessation program, such as the American Lung Association's Freedom from Smoking program.  · Get text messaging support. Go to the website at www.smokefree. gov to sign up for the [facility] program.  · Set a quit date. Pick your date carefully so that it is not right in the middle of a big deadline or stressful time. Once you quit, do not even take a puff. Get rid of all ashtrays and lighters after your last cigarette. Clean your house and your clothes so that they do not smell of smoke. · Learn how to be a nonsmoker. Think about ways you can avoid those things that make you reach for a cigarette. ? Avoid situations that put you at greatest risk for smoking. For some people, it is hard to have a drink with friends without smoking. For others, they might skip a coffee break with coworkers who smoke. ? Change your daily routine. Take a different route to work or eat a meal in a different place. · Cut down on stress. Calm yourself or release tension by doing an activity you enjoy, such as reading a book, taking a hot bath, or gardening. · Talk to your doctor or pharmacist about nicotine replacement therapy, which replaces the nicotine in your body. You still get nicotine but you do not use tobacco. Nicotine replacement products help you slowly reduce the amount of nicotine you need. These products come in several forms, many of them available over-the-counter:  ? Nicotine patches  ? Nicotine gum and lozenges  ?  Nicotine inhaler  · Ask your doctor about bupropion (Wellbutrin) or varenicline (Chantix), which are prescription medicines. They do not contain nicotine. They help you by reducing withdrawal symptoms, such as stress and anxiety. · Some people find hypnosis, acupuncture, and massage helpful for ending the smoking habit. · Eat a healthy diet and get regular exercise. Having healthy habits will help your body move past its craving for nicotine. · Be prepared to keep trying. Most people are not successful the first few times they try to quit. Do not get mad at yourself if you smoke again. Make a list of things you learned and think about when you want to try again, such as next week, next month, or next year. Where can you learn more? Go to https://TVplus.Datadog. org and sign in to your Needle account. Enter R839 in the ColorPlaza box to learn more about \"Stopping Smoking: Care Instructions. \"     If you do not have an account, please click on the \"Sign Up Now\" link. Current as of: March 12, 2020               Content Version: 12.6  © 8314-9331 [employer], Incorporated. Care instructions adapted under license by [facility] ([facility]). If you have questions about a medical condition or this instruction, always ask your healthcare professional. [place] [address] any warranty or liability for your use of this information. Personalized Preventive Plan for Miriam Davis - 11/24/2020  Medicare offers a range of preventive health benefits. Some of the tests and screenings are paid in full while other may be subject to a deductible, co-insurance, and/or copay. Some of these benefits include a comprehensive review of your medical history including lifestyle, illnesses that may run in your family, and various assessments and screenings as appropriate. After reviewing your medical record and screening and assessments performed today your provider may have ordered immunizations, labs, imaging, and/or referrals for you.   A list of these orders (if applicable) as well as your Preventive Care list are included within your After Visit Summary for your review. Other Preventive Recommendations:    · A preventive eye exam performed by an eye specialist is recommended every 1-2 years to screen for glaucoma; cataracts, macular degeneration, and other eye disorders. · A preventive dental visit is recommended every 6 months. · Try to get at least 150 minutes of exercise per week or 10,000 steps per day on a pedometer . · Order or download the FREE \"Exercise & Physical Activity: Your Everyday Guide\" from [employer]. Call 6-229.181.3048 or search [employer] online. · You need 2179-1303 mg of calcium and 9764-5363 IU of vitamin D per day. It is possible to meet your calcium requirement with diet alone, but a vitamin D supplement is usually necessary to meet this goal.  · When exposed to the sun, use a sunscreen that protects against both UVA and UVB radiation with an SPF of 30 or greater. Reapply every 2 to 3 hours or after sweating, drying off with a towel, or swimming. · Always wear a seat belt when traveling in a car. Always wear a helmet when riding a bicycle or motorcycle.

## 2020-11-24 NOTE — PROGRESS NOTES
Medicare Annual Wellness Visit  Name: Evelyn Gonzalez Date: 2020   MRN: [id number] Sex: Female   Age: 56 y.o. Ethnicity: Non-/Non    : 1960 Race: Katharina Brunner Millhizer is here for Medicare AWV    Screenings for behavioral, psychosocial and functional/safety risks, and cognitive dysfunction are all negative except as indicated below. These results, as well as other patient data from the [address] form, are documented in Flowsheets linked to this Encounter. Allergies   Allergen Reactions    Bupropion Nausea Only    Gabapentin      hallucinations    Sulfa Antibiotics      Causes hypoglycemia     Varenicline     Wellbutrin [Bupropion Hcl] Nausea Only    Azithromycin Rash    Levaquin [Levofloxacin] Rash    Penicillins Rash         Prior to Visit Medications    Medication Sig Taking? Authorizing Provider   pregabalin (LYRICA) 50 MG capsule Take 1 capsule by mouth 2 times daily for 30 days.   Dereck Felty, APRN   albuterol sulfate HFA (PROVENTIL HFA) 108 (90 Base) MCG/ACT inhaler Inhale 2 puffs into the lungs every 6 hours as needed for Wheezing  RAMONA Mack - NP   losartan (COZAAR) 50 MG tablet TAKE 1 TABLET BY MOUTH TWICE DAILY  Dereck Felty, APRN   famotidine (PEPCID) 20 MG tablet TAKE 1 TABLET BY MOUTH TWICE DAILY  Dereck Felty, APRN   insulin glargine (LANTUS SOLOSTAR) 100 UNIT/ML injection pen INJECT 10 UNITS INTO THE SKIN ONCE DAILY  Dereck Felty, APRN   pravastatin (PRAVACHOL) 10 MG tablet TAKE 1 TABLET BY MOUTH EVERY NIGHT AT BEDTIME  Dereck Felty, APRN   ondansetron (ZOFRAN) 4 MG tablet Take 4 mg by mouth every 6 hours  Historical Provider, MD   ipratropium-albuterol (DUONEB) 0.5-2.5 (3) MG/3ML SOLN nebulizer solution Inhale 3 mLs into the lungs every 4 hours  Dereck Felty, APRN   dicyclomine (BENTYL) 10 MG capsule Take 1 capsule by mouth 4 times daily  Dereck Felty, APRN   B-D ULTRAFINE III SHORT PEN 31G X 8 MM MISC USE ONCE DAILY TO INJECT INSULIN DAILY  RAMONA Wilson   carvedilol (COREG) 12.5 MG tablet Take 12.5 mg by mouth 2 times daily (with meals)  Historical Provider, MD   lactulose (CHRONULAC) 10 GM/15ML solution TAKE 30 ML BY MOUTH THREE TIMES DAILY  RAMONA Wilson   Cholecalciferol (VITAMIN D3) 1000 units CAPS Take by mouth  Historical Provider, MD   blood glucose monitor kit and supplies 1 kit by Other route 3 times daily Test three times a day & as needed for symptoms of irregular blood glucose. RAMONA Suazo   Travoprost, BAK Free, (TRAVATAN Z) 0.004 % SOLN ophthalmic solution 1 drop nightly  Historical Provider, MD   midodrine (PROAMATINE) 5 MG tablet TK 1 T PO HALF WAY THROUGH TREATMENT AS DIRECTED  Historical Provider, MD   sevelamer (RENVELA) 800 MG tablet Take 1 tablet by mouth daily   Historical Provider, MD   aspirin EC 81 MG EC tablet Take 81 mg by mouth daily  Historical Provider, MD   fluticasone (FLONASE) 50 MCG/ACT nasal spray 2 sprays by Nasal route daily. To keep ears opened up  Casie Cardoso MD   Blood Glucose Monitoring Suppl NIKOS by Does not apply route. Pt will also need new lancet device if not included in kit. Casie Cardoso MD   Glucose Blood (BLOOD GLUCOSE TEST STRIPS) STRP Check blood sugar twice a day for recent medication adjustments. Casie Cardoso MD   bimatoprost 0.01 % SOLN Apply 1 drop to eye nightly. One drop in left eye at hs  Historical Provider, MD   Insulin Syringe-Needle U-100 (ACCUSURE INS SYR 1CC/31GX5/16\") 31G X 5/16\" 1 ML MISC by Does not apply route.   Casie Cardoso MD   timolol (TIMOPTIC-XR) 0.5 % ophthalmic gel-forming Place 1 drop into the left eye 2 times daily   Historical Provider, MD   dorzolamide (TRUSOPT) 2 % ophthalmic solution Place 1 drop into the left eye 2 times daily   Historical Provider, MD   brimonidine (ALPHAGAN P) 0.15 % ophthalmic solution Place 1 drop into the left eye 3 times daily   Historical Provider, MD   homatropine 5 % ophthalmic solution Place 1 drop into the left eye daily   Historical Provider, MD         Past Medical History:   Diagnosis Date    Blind right eye     partial vision loss in the L eye too, due to DM    Blindness of one eye     Right     CHF (congestive heart failure) (HCC)     CKD (chronic kidney disease), stage IV (HCC)     secondary to diabetic nephropathy    [facility]     Diabetes mellitus with ophthalmic manifestations     dx'd in 2000 but likely ongoing before that, was dx'd when she went blind in the R eye    Glaucoma     [facility]     d[facility] tues, thwilian, [facility]. [facility]    Hypertension     2000, dx'd at same time as the DM    Obesity     Renal osteodystrophy     Smoker     Type II or unspecified type diabetes mellitus with renal manifestations, uncontrolled(250.42)        Past Surgical History:   Procedure Laterality Date    CARPAL TUNNEL RELEASE      CATARACT REMOVAL     [address] [place], and 1979    CHOLECYSTECTOMY      COLONOSCOPY N/A 3/9/2018    Dr ReevesAbexar-Sxvalxsunnmsqw-Nxywbxqqwhlnc AP (-) dysplasia x 1, tubular AP x  (-) dysplasia x 1, HP x 4--1 yr recall    DIALYSIS FISTULA CREATION Left 4/23/15 [facility]    Left proximal ulnar artery to cephalic vein AVF creation    EYE SURGERY      laser, several times     TYMPANOSTOMY TUBE PLACEMENT Bilateral     VASCULAR SURGERY Left 5/11/15 [facility]    US-guided cannulation left distal upper arm cephalic vein with 4-Thai glide sheath; LUE AV f'grams with venography SVC; left cephalic vein BAM with [id number] julieth balloon; completion venogram YASMEEN    VASCULAR SURGERY Left 5/28/15 [facility]    Percutaneous cannulation left distal radial artery with 4-Thai glide sheath; LUE AV f'grams with venography SVC; left cephalic vein balloon a'plasty with [id number] julieth balloon and [id number] julieth balloon; proximal and mid upper arm cephalic vein BAM with [id number] julieth balloon; completion venogram ARSENIO    VASCULAR SURGERY Left 6/15/15 [facility]    US-guided cannulation left cephalic vein with 4-Faroese and later 7-Faroese sheath; LUE AV f'grams including venography SVC; left cephalic vein stenosis balloon a'plasty with [id number] cutting balloon; completion f'gram and venogram LUE    VASCULAR SURGERY  05/15/2017    [facility]. Left upper fistulograms/venograms.  VASCULAR SURGERY  02/14/2018    [facility]. Left upper fistulograms, left subclavian BA 12x40 atlas, left cephalic arch BA 47K20 conquest.    VASCULAR SURGERY  02/13/2019    [facility]. Left upper fistulograms,BA left SCV 8x40 conquest,stent left SCV, viabahn 13x50,left SCV stent BA 12x40 conquest.         Family History   Problem Relation Age of Onset    Heart Disease Mother     Heart Attack Mother     Glaucoma Sister     Diabetes Sister     Diabetes Brother     Kidney Disease Brother     Blindness Brother     Stroke Maternal Grandmother     Stroke Maternal Grandfather     Colon Cancer Neg Hx     Colon Polyps Neg Hx     Liver Cancer Neg Hx     Liver Disease Neg Hx     Esophageal Cancer Neg Hx     Rectal Cancer Neg Hx     Stomach Cancer Neg Hx        CareTeam (Including outside providers/suppliers regularly involved in providing care):   Patient Care Team:  RAMONA Medina as PCP - General (Nurse Practitioner Family)  RAMONA Medina [facility]CP - [facility] Empaneled Provider  Eeluterio García MD (Nephrology)  Marshall Otero DO as Consulting Physician (Vascular Surgery)  Markel Stern, RN as Ambulatory Care Manager    Wt Readings from Last 3 Encounters:   11/11/20 147 lb (66.7 kg)   11/04/20 160 lb (72.6 kg)   10/10/20 163 lb (73.9 kg)     There were no vitals filed for this visit. There is no height or weight on file to calculate BMI. Based upon direct observation of the patient, evaluation of cognition reveals recent and remote memory intact. Patient's complete Health Risk Assessment and screening values have been reviewed and are found in Flowsheets.  The following problems were reviewed today and where indicated follow up appointments were made and/or referrals ordered. Positive Risk Factor Screenings with Interventions:     Fall Risk:  2 or more falls in past year?: (!) yes  Fall with injury in past year?: (!) yes  Fall Risk Interventions:    · Home safety tips provided    Substance History:  Social History     Tobacco History     Smoking Status  Current Every Day Smoker Smoking Frequency  1 pack/day for 40 years (40 pk yrs)    Smokeless Tobacco Use  Never Used          Alcohol History     Alcohol Use Status  No          Drug Use     Drug Use Status  Yes Types  Marijuana          Sexual Activity     Sexually Active  Not Currently Partners  Male               Alcohol Screening:       A score of 8 or more is associated with harmful or hazardous drinking. A score of 13 or more in women, and 15 or more in men, is likely to indicate alcohol dependence. Substance Abuse Interventions:  · Tobacco abuse:  tobacco cessation tips and resources provided    General Health and ACP:  General  In general, how would you say your health is?: Fair  In the past 7 days, have you experienced any of the following?  New or Increased Pain, New or Increased Fatigue, Loneliness, Social Isolation, Stress or Anger?: None of These  Do you get the social and emotional support that you need?: Yes  Do you have a Living Will?: (!) No  Advance Directives     Power of [facility] Will ACP-Advance Directive ACP-Power of     Not on File Not on File [address] [facility] Risk Interventions:  · No Living Will: Advance Care Planning addressed with patient today    Health Habits/Nutrition:  Health Habits/Nutrition  Do you exercise for at least 20 minutes 2-3 times per week?: (!) No  Have you lost any weight without trying in the past 3 months?: (!) Yes  Do you eat fewer than 2 meals per day?: No  Have you seen a dentist within the past year?: (!) No     Health Habits/Nutrition Interventions:  · Dental exam overdue:  patient encouraged to make appointment with his/her dentist    Hearing/Vision:  No exam data present  Hearing/Vision  Do you or your family notice any trouble with your hearing?: No  Do you have difficulty driving, watching TV, or doing any of your daily activities because of your eyesight?: (!) Yes  Have you had an eye exam within the past year?: Yes  Hearing/Vision Interventions:  · Vision concerns:  has had follow up with optometry     ADL:  ADLs  In the past 7 days, did you need help from others to perform any of the following everyday activities? Eating, dressing, grooming, bathing, toileting, or walking/balance?: (!) Dressing  In the past 7 days, did you need help from others to take care of any of the following?  Laundry, housekeeping, banking/finances, shopping, telephone use, food preparation, transportation, or taking medications?: (!) Laundry, Housekeeping, Banking/Finances, Shopping, Telephone Use, Food Preparation, Transportation, Taking Medications, None  ADL Interventions:  · Patient declines any further evaluation/treatment for this issue    Personalized Preventive Plan   Current Health Maintenance Status  Immunization History   Administered Date(s) Administered    Influenza 09/20/2012    Influenza Vaccine, unspecified formulation 10/03/2017    Influenza Virus Vaccine 12/31/2014, 12/08/2015, 10/11/2020    Influenza, MDCK Quadv, IM, PF (Flucelvax 4 yrs and older) 12/09/2019    Influenza, Nellene Anitra, IM, (6 mo and older Fluzone, Flulaval, Fluarix and 3 yrs and older Afluria) 11/06/2018    Influenza, Quadv, IM, PF (6 mo and older Fluzone, Flulaval, Fluarix, and 3 yrs and older Afluria) 12/12/2016    Pneumococcal Conjugate 13-valent ([id number]) 12/12/2016    Pneumococcal Polysaccharide ([id number]) 11/21/2006, 12/08/2015        Health Maintenance   Topic Date Due    DTaP/Tdap/Td vaccine (1 - Tdap) 03/20/1977    Shingles Vaccine (1 of 2) 03/20/2008    Breast cancer screen  03/23/2019    Annual Wellness Visit (AWV)  05/29/2019    Diabetic foot exam  01/23/2020    Low dose CT lung screening  01/19/2021    Colon cancer screen colonoscopy  03/09/2021    A1C test (Diabetic or Prediabetic)  05/17/2021    Lipid screen  06/09/2021    Diabetic retinal exam  09/23/2021    Potassium monitoring  10/10/2021    Creatinine monitoring  10/10/2021    Cervical cancer screen  03/23/2023    Flu vaccine  Completed    Pneumococcal 0-64 years Vaccine  Completed    Hepatitis C screen  Completed    HIV screen  Completed    Hepatitis A vaccine  Aged Out    Hib vaccine  Aged Out    Meningococcal (ACWY) vaccine  Aged Out     Recommendations for [employer] Due: see orders and patient instructions/AVS.  . Recommended screening schedule for the next 5-10 years is provided to the patient in written form: see Patient Instructions/AVS.    Amaury Salcedo was seen today for medicare awv. Diagnoses and all orders for this visit:    Routine general medical examination at a health care facility    Advance care Filiberto Santizo 90 [[id number]]    Personal history of tobacco use, presenting hazards to health  -     [place] TOBACCO USE CESSATION INTERMEDIATE 3-10 MINUTES [23902]                Advance Care Planning   Advanced Care Planning: Discussed the patients choices for care and treatment in case of a health event that adversely affects decision-making abilities. Also discussed the patients long-term treatment options. Reviewed the process of designating a Health Care Surrogate as defined by the [facility]. Reviewed the [facility] Will Directive process and the kinds of life-sustaining treatments the patient would like to receive should they become terminally ill or permanently unconscious. Explained the state requirement to complete the forms in the presence of two eligible witnesses OR in the presence of a .  Patient was asked to provide a copy of the signed forms to the practice office. Time spent (minutes): 30     Tobacco Cessation Counseling: Patient advised about behavior change, including information about personal health harms, usage of appropriate cessation measures and benefits of cessation.   Time spent (minutes): 5

## 2021-01-01 ENCOUNTER — HOSPITAL ENCOUNTER (EMERGENCY)
Facility: HOSPITAL | Age: 61
Discharge: HOME | End: 2021-04-01
Admitting: EMERGENCY MEDICINE

## 2021-01-01 ENCOUNTER — READMISSION MANAGEMENT (OUTPATIENT)
Dept: CALL CENTER | Facility: HOSPITAL | Age: 61
End: 2021-01-01

## 2021-01-01 ENCOUNTER — HOSPITAL ENCOUNTER (INPATIENT)
Facility: HOSPITAL | Age: 61
LOS: 1 days | Discharge: HOME HEALTH | End: 2021-03-28
Attending: EMERGENCY MEDICINE | Admitting: INTERNAL MEDICINE

## 2021-01-01 ENCOUNTER — PREP FOR SURGERY (OUTPATIENT)
Dept: OTHER | Facility: HOSPITAL | Age: 61
End: 2021-01-01

## 2021-01-01 ENCOUNTER — APPOINTMENT (OUTPATIENT)
Dept: CT IMAGING | Facility: HOSPITAL | Age: 61
End: 2021-01-01

## 2021-01-01 ENCOUNTER — LAB (OUTPATIENT)
Dept: LAB | Facility: HOSPITAL | Age: 61
End: 2021-01-01

## 2021-01-01 ENCOUNTER — APPOINTMENT (OUTPATIENT)
Dept: GENERAL RADIOLOGY | Facility: HOSPITAL | Age: 61
End: 2021-01-01

## 2021-01-01 ENCOUNTER — TELEPHONE (OUTPATIENT)
Dept: PODIATRY | Facility: CLINIC | Age: 61
End: 2021-01-01

## 2021-01-01 ENCOUNTER — PRE-ADMISSION TESTING (OUTPATIENT)
Dept: PREADMISSION TESTING | Facility: HOSPITAL | Age: 61
End: 2021-01-01

## 2021-01-01 ENCOUNTER — TRANSCRIBE ORDERS (OUTPATIENT)
Dept: LAB | Facility: HOSPITAL | Age: 61
End: 2021-01-01

## 2021-01-01 ENCOUNTER — TELEPHONE (OUTPATIENT)
Dept: VASCULAR SURGERY | Facility: CLINIC | Age: 61
End: 2021-01-01

## 2021-01-01 ENCOUNTER — ANESTHESIA EVENT (OUTPATIENT)
Dept: PERIOP | Facility: HOSPITAL | Age: 61
End: 2021-01-01

## 2021-01-01 ENCOUNTER — HOSPITAL ENCOUNTER (INPATIENT)
Facility: HOSPITAL | Age: 61
LOS: 4 days | Discharge: HOME HEALTH | End: 2021-04-08
Attending: FAMILY MEDICINE | Admitting: INTERNAL MEDICINE

## 2021-01-01 ENCOUNTER — ANESTHESIA (OUTPATIENT)
Dept: PERIOP | Facility: HOSPITAL | Age: 61
End: 2021-01-01

## 2021-01-01 ENCOUNTER — APPOINTMENT (OUTPATIENT)
Dept: CARDIOLOGY | Facility: HOSPITAL | Age: 61
End: 2021-01-01

## 2021-01-01 ENCOUNTER — APPOINTMENT (OUTPATIENT)
Dept: MRI IMAGING | Facility: HOSPITAL | Age: 61
End: 2021-01-01

## 2021-01-01 ENCOUNTER — HOSPITAL ENCOUNTER (EMERGENCY)
Facility: HOSPITAL | Age: 61
Discharge: HOME | End: 2021-04-27
Attending: EMERGENCY MEDICINE | Admitting: EMERGENCY MEDICINE

## 2021-01-01 ENCOUNTER — OFFICE VISIT (OUTPATIENT)
Dept: GASTROENTEROLOGY | Facility: CLINIC | Age: 61
End: 2021-01-01

## 2021-01-01 ENCOUNTER — APPOINTMENT (OUTPATIENT)
Dept: NEUROLOGY | Facility: HOSPITAL | Age: 61
End: 2021-01-01

## 2021-01-01 ENCOUNTER — NURSE TRIAGE (OUTPATIENT)
Dept: CALL CENTER | Facility: HOSPITAL | Age: 61
End: 2021-01-01

## 2021-01-01 ENCOUNTER — HOSPITAL ENCOUNTER (INPATIENT)
Facility: HOSPITAL | Age: 61
LOS: 2 days | Discharge: HOME | End: 2021-05-15
Attending: SURGERY | Admitting: SURGERY

## 2021-01-01 ENCOUNTER — TRANSCRIBE ORDERS (OUTPATIENT)
Dept: ADMINISTRATIVE | Facility: HOSPITAL | Age: 61
End: 2021-01-01

## 2021-01-01 ENCOUNTER — HOSPITAL ENCOUNTER (INPATIENT)
Facility: HOSPITAL | Age: 61
LOS: 10 days | End: 2021-07-10
Attending: EMERGENCY MEDICINE | Admitting: INTERNAL MEDICINE

## 2021-01-01 ENCOUNTER — APPOINTMENT (OUTPATIENT)
Dept: INTERVENTIONAL RADIOLOGY/VASCULAR | Facility: HOSPITAL | Age: 61
End: 2021-01-01

## 2021-01-01 ENCOUNTER — APPOINTMENT (OUTPATIENT)
Dept: PREADMISSION TESTING | Facility: HOSPITAL | Age: 61
End: 2021-01-01

## 2021-01-01 ENCOUNTER — HOSPITAL ENCOUNTER (INPATIENT)
Facility: HOSPITAL | Age: 61
LOS: 8 days | Discharge: NURSING, REHAB OR LONG-TERM CARE | End: 2021-06-24
Attending: INTERNAL MEDICINE | Admitting: INTERNAL MEDICINE

## 2021-01-01 ENCOUNTER — HOSPITAL ENCOUNTER (INPATIENT)
Facility: HOSPITAL | Age: 61
LOS: 7 days | Discharge: NURSING, REHAB OR LONG-TERM CARE | End: 2021-06-08
Attending: EMERGENCY MEDICINE | Admitting: INTERNAL MEDICINE

## 2021-01-01 ENCOUNTER — TELEPHONE (OUTPATIENT)
Dept: GASTROENTEROLOGY | Facility: HOSPITAL | Age: 61
End: 2021-01-01

## 2021-01-01 ENCOUNTER — APPOINTMENT (OUTPATIENT)
Dept: LAB | Facility: HOSPITAL | Age: 61
End: 2021-01-01

## 2021-01-01 VITALS
HEIGHT: 58 IN | BODY MASS INDEX: 31.3 KG/M2 | OXYGEN SATURATION: 84 % | RESPIRATION RATE: 28 BRPM | TEMPERATURE: 97.8 F | WEIGHT: 149.1 LBS | DIASTOLIC BLOOD PRESSURE: 29 MMHG | SYSTOLIC BLOOD PRESSURE: 57 MMHG | HEART RATE: 75 BPM

## 2021-01-01 VITALS
RESPIRATION RATE: 18 BRPM | BODY MASS INDEX: 22 KG/M2 | HEART RATE: 85 BPM | DIASTOLIC BLOOD PRESSURE: 60 MMHG | SYSTOLIC BLOOD PRESSURE: 128 MMHG | HEIGHT: 65 IN | TEMPERATURE: 98.2 F | OXYGEN SATURATION: 96 % | WEIGHT: 132.06 LBS

## 2021-01-01 VITALS
TEMPERATURE: 97.6 F | RESPIRATION RATE: 18 BRPM | BODY MASS INDEX: 32.28 KG/M2 | HEART RATE: 100 BPM | WEIGHT: 153.8 LBS | OXYGEN SATURATION: 98 % | SYSTOLIC BLOOD PRESSURE: 106 MMHG | DIASTOLIC BLOOD PRESSURE: 80 MMHG | HEIGHT: 58 IN

## 2021-01-01 VITALS
OXYGEN SATURATION: 96 % | TEMPERATURE: 97.8 F | SYSTOLIC BLOOD PRESSURE: 116 MMHG | HEIGHT: 58 IN | DIASTOLIC BLOOD PRESSURE: 76 MMHG | HEART RATE: 84 BPM | RESPIRATION RATE: 18 BRPM | WEIGHT: 136.69 LBS | BODY MASS INDEX: 28.69 KG/M2

## 2021-01-01 VITALS
HEIGHT: 59 IN | SYSTOLIC BLOOD PRESSURE: 124 MMHG | RESPIRATION RATE: 18 BRPM | BODY MASS INDEX: 27.21 KG/M2 | TEMPERATURE: 97.8 F | OXYGEN SATURATION: 94 % | WEIGHT: 135 LBS | DIASTOLIC BLOOD PRESSURE: 90 MMHG | HEART RATE: 80 BPM

## 2021-01-01 VITALS
BODY MASS INDEX: 35.08 KG/M2 | TEMPERATURE: 98 F | HEIGHT: 55 IN | SYSTOLIC BLOOD PRESSURE: 97 MMHG | WEIGHT: 151.6 LBS | RESPIRATION RATE: 17 BRPM | HEART RATE: 84 BPM | DIASTOLIC BLOOD PRESSURE: 64 MMHG | OXYGEN SATURATION: 94 %

## 2021-01-01 VITALS
RESPIRATION RATE: 16 BRPM | HEART RATE: 98 BPM | TEMPERATURE: 98.3 F | WEIGHT: 143.5 LBS | OXYGEN SATURATION: 98 % | BODY MASS INDEX: 33.21 KG/M2 | SYSTOLIC BLOOD PRESSURE: 124 MMHG | HEIGHT: 55 IN | DIASTOLIC BLOOD PRESSURE: 80 MMHG

## 2021-01-01 VITALS
RESPIRATION RATE: 20 BRPM | HEIGHT: 58 IN | BODY MASS INDEX: 29.39 KG/M2 | DIASTOLIC BLOOD PRESSURE: 60 MMHG | OXYGEN SATURATION: 100 % | WEIGHT: 140 LBS | TEMPERATURE: 97.4 F | SYSTOLIC BLOOD PRESSURE: 106 MMHG | HEART RATE: 94 BPM

## 2021-01-01 VITALS
WEIGHT: 134 LBS | TEMPERATURE: 96.9 F | OXYGEN SATURATION: 98 % | HEART RATE: 76 BPM | DIASTOLIC BLOOD PRESSURE: 62 MMHG | SYSTOLIC BLOOD PRESSURE: 158 MMHG | HEIGHT: 58 IN | BODY MASS INDEX: 28.13 KG/M2

## 2021-01-01 VITALS
OXYGEN SATURATION: 95 % | HEART RATE: 76 BPM | SYSTOLIC BLOOD PRESSURE: 89 MMHG | DIASTOLIC BLOOD PRESSURE: 75 MMHG | RESPIRATION RATE: 20 BRPM | WEIGHT: 135.4 LBS | TEMPERATURE: 97.8 F | BODY MASS INDEX: 27.3 KG/M2 | HEIGHT: 59 IN

## 2021-01-01 VITALS
BODY MASS INDEX: 29.34 KG/M2 | OXYGEN SATURATION: 99 % | HEIGHT: 58 IN | DIASTOLIC BLOOD PRESSURE: 54 MMHG | SYSTOLIC BLOOD PRESSURE: 145 MMHG | WEIGHT: 139.77 LBS | HEART RATE: 70 BPM | RESPIRATION RATE: 16 BRPM

## 2021-01-01 DIAGNOSIS — R77.8 ELEVATED TROPONIN: ICD-10-CM

## 2021-01-01 DIAGNOSIS — S42.291D FRACTURE OF HUMERAL HEAD, RIGHT, CLOSED, WITH ROUTINE HEALING, SUBSEQUENT ENCOUNTER: ICD-10-CM

## 2021-01-01 DIAGNOSIS — Z74.09 IMPAIRED MOBILITY: ICD-10-CM

## 2021-01-01 DIAGNOSIS — Z11.59 SCREENING FOR VIRAL DISEASE: Primary | ICD-10-CM

## 2021-01-01 DIAGNOSIS — Z01.818 PREOPERATIVE TESTING: Primary | ICD-10-CM

## 2021-01-01 DIAGNOSIS — Z78.9 IMPAIRED MOBILITY AND ADLS: ICD-10-CM

## 2021-01-01 DIAGNOSIS — S42.294A OTHER CLOSED NONDISPLACED FRACTURE OF PROXIMAL END OF RIGHT HUMERUS, INITIAL ENCOUNTER: Primary | ICD-10-CM

## 2021-01-01 DIAGNOSIS — E87.1 HYPONATREMIA: ICD-10-CM

## 2021-01-01 DIAGNOSIS — N19 UREMIA: ICD-10-CM

## 2021-01-01 DIAGNOSIS — R19.5 HEME POSITIVE STOOL: Primary | ICD-10-CM

## 2021-01-01 DIAGNOSIS — D64.9 ANEMIA, UNSPECIFIED TYPE: ICD-10-CM

## 2021-01-01 DIAGNOSIS — R13.10 DYSPHAGIA, UNSPECIFIED TYPE: ICD-10-CM

## 2021-01-01 DIAGNOSIS — Z99.2 CKD (CHRONIC KIDNEY DISEASE) STAGE V REQUIRING CHRONIC DIALYSIS (HCC): ICD-10-CM

## 2021-01-01 DIAGNOSIS — T82.590A DIALYSIS AV FISTULA MALFUNCTION, INITIAL ENCOUNTER (HCC): Primary | ICD-10-CM

## 2021-01-01 DIAGNOSIS — I95.9 HYPOTENSION, UNSPECIFIED HYPOTENSION TYPE: ICD-10-CM

## 2021-01-01 DIAGNOSIS — T82.590A DIALYSIS AV FISTULA MALFUNCTION, INITIAL ENCOUNTER (HCC): ICD-10-CM

## 2021-01-01 DIAGNOSIS — Z74.09 IMPAIRED MOBILITY AND ADLS: ICD-10-CM

## 2021-01-01 DIAGNOSIS — K92.2 GASTROINTESTINAL HEMORRHAGE, UNSPECIFIED GASTROINTESTINAL HEMORRHAGE TYPE: ICD-10-CM

## 2021-01-01 DIAGNOSIS — R46.89 COGNITIVE AND BEHAVIORAL CHANGES: ICD-10-CM

## 2021-01-01 DIAGNOSIS — S42.291D FRACTURE OF HUMERAL HEAD, RIGHT, CLOSED, WITH ROUTINE HEALING, SUBSEQUENT ENCOUNTER: Primary | ICD-10-CM

## 2021-01-01 DIAGNOSIS — A41.9 SEPSIS, DUE TO UNSPECIFIED ORGANISM, UNSPECIFIED WHETHER ACUTE ORGAN DYSFUNCTION PRESENT (HCC): ICD-10-CM

## 2021-01-01 DIAGNOSIS — N18.6 CKD (CHRONIC KIDNEY DISEASE) STAGE V REQUIRING CHRONIC DIALYSIS (HCC): ICD-10-CM

## 2021-01-01 DIAGNOSIS — N18.6 ESRD (END STAGE RENAL DISEASE) (HCC): ICD-10-CM

## 2021-01-01 DIAGNOSIS — J69.0 ASPIRATION PNEUMONIA, UNSPECIFIED ASPIRATION PNEUMONIA TYPE, UNSPECIFIED LATERALITY, UNSPECIFIED PART OF LUNG (HCC): ICD-10-CM

## 2021-01-01 DIAGNOSIS — E16.2 HYPOGLYCEMIA: ICD-10-CM

## 2021-01-01 DIAGNOSIS — Z79.4 TYPE 2 DIABETES MELLITUS WITH OTHER OPHTHALMIC COMPLICATION, WITH LONG-TERM CURRENT USE OF INSULIN (HCC): ICD-10-CM

## 2021-01-01 DIAGNOSIS — J90 PLEURAL EFFUSION: ICD-10-CM

## 2021-01-01 DIAGNOSIS — Z78.9 DECREASED ACTIVITIES OF DAILY LIVING (ADL): ICD-10-CM

## 2021-01-01 DIAGNOSIS — S42.291D HUMERAL HEAD FRACTURE, RIGHT, WITH ROUTINE HEALING, SUBSEQUENT ENCOUNTER: Primary | ICD-10-CM

## 2021-01-01 DIAGNOSIS — S42.201A CLOSED FRACTURE OF PROXIMAL END OF RIGHT HUMERUS, UNSPECIFIED FRACTURE MORPHOLOGY, INITIAL ENCOUNTER: ICD-10-CM

## 2021-01-01 DIAGNOSIS — R41.82 ALTERED MENTAL STATUS, UNSPECIFIED ALTERED MENTAL STATUS TYPE: Primary | ICD-10-CM

## 2021-01-01 DIAGNOSIS — R09.02 HYPOXIA: ICD-10-CM
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
## 2021-01-01 DIAGNOSIS — N18.6 END STAGE RENAL DISEASE (HCC): ICD-10-CM

## 2021-01-01 DIAGNOSIS — S01.91XA LACERATION OF HEAD WITHOUT FOREIGN BODY, UNSPECIFIED PART OF HEAD, INITIAL ENCOUNTER: ICD-10-CM

## 2021-01-01 DIAGNOSIS — D64.9 CHRONIC ANEMIA: ICD-10-CM

## 2021-01-01 DIAGNOSIS — I50.31 ACUTE DIASTOLIC HEART FAILURE (HCC): ICD-10-CM

## 2021-01-01 DIAGNOSIS — I46.9 CARDIORESPIRATORY ARREST (HCC): ICD-10-CM

## 2021-01-01 DIAGNOSIS — R41.89 COGNITIVE AND BEHAVIORAL CHANGES: ICD-10-CM

## 2021-01-01 DIAGNOSIS — Z01.818 PRE-OP TESTING: Primary | ICD-10-CM

## 2021-01-01 DIAGNOSIS — E11.39 TYPE 2 DIABETES MELLITUS WITH OTHER OPHTHALMIC COMPLICATION, WITH LONG-TERM CURRENT USE OF INSULIN (HCC): ICD-10-CM

## 2021-01-01 DIAGNOSIS — J81.0 ACUTE PULMONARY EDEMA (HCC): ICD-10-CM

## 2021-01-01 DIAGNOSIS — I50.9 CONGESTIVE HEART FAILURE, UNSPECIFIED HF CHRONICITY, UNSPECIFIED HEART FAILURE TYPE (HCC): ICD-10-CM

## 2021-01-01 LAB
ABO GROUP BLD: NORMAL
ADV 40+41 DNA STL QL NAA+NON-PROBE: NOT DETECTED
ADV 40+41 DNA STL QL NAA+NON-PROBE: NOT DETECTED
ALBUMIN SERPL-MCNC: 2.9 G/DL (ref 3.5–5.2)
ALBUMIN SERPL-MCNC: 3.3 G/DL (ref 3.5–5.2)
ALBUMIN SERPL-MCNC: 3.4 G/DL (ref 3.5–5.2)
ALBUMIN SERPL-MCNC: 3.4 G/DL (ref 3.5–5.2)
ALBUMIN SERPL-MCNC: 3.5 G/DL (ref 3.5–5.2)
ALBUMIN SERPL-MCNC: 3.6 G/DL (ref 3.5–5.2)
ALBUMIN SERPL-MCNC: 3.7 G/DL (ref 3.5–5.2)
ALBUMIN SERPL-MCNC: 3.7 G/DL (ref 3.5–5.2)
ALBUMIN SERPL-MCNC: 3.8 G/DL (ref 3.5–5.2)
ALBUMIN SERPL-MCNC: 3.9 G/DL (ref 3.5–5.2)
ALBUMIN SERPL-MCNC: 3.9 G/DL (ref 3.5–5.2)
ALBUMIN SERPL-MCNC: 4 G/DL (ref 3.5–5.2)
ALBUMIN SERPL-MCNC: 4 G/DL (ref 3.5–5.2)
ALBUMIN SERPL-MCNC: 4.2 G/DL (ref 3.5–5.2)
ALBUMIN SERPL-MCNC: 4.4 G/DL (ref 3.5–5.2)
ALBUMIN/GLOB SERPL: 1.1 G/DL
ALBUMIN/GLOB SERPL: 1.1 G/DL
ALBUMIN/GLOB SERPL: 1.2 G/DL
ALBUMIN/GLOB SERPL: 1.2 G/DL
ALBUMIN/GLOB SERPL: 1.3 G/DL
ALBUMIN/GLOB SERPL: 1.4 G/DL
ALBUMIN/GLOB SERPL: 1.4 G/DL
ALBUMIN/GLOB SERPL: 1.5 G/DL
ALP SERPL-CCNC: 182 U/L (ref 39–117)
ALP SERPL-CCNC: 199 U/L (ref 39–117)
ALP SERPL-CCNC: 200 U/L (ref 39–117)
ALP SERPL-CCNC: 206 U/L (ref 39–117)
ALP SERPL-CCNC: 206 U/L (ref 39–117)
ALP SERPL-CCNC: 212 U/L (ref 39–117)
ALP SERPL-CCNC: 221 U/L (ref 39–117)
ALP SERPL-CCNC: 229 U/L (ref 39–117)
ALP SERPL-CCNC: 229 U/L (ref 39–117)
ALP SERPL-CCNC: 236 U/L (ref 39–117)
ALP SERPL-CCNC: 249 U/L (ref 39–117)
ALP SERPL-CCNC: 260 U/L (ref 39–117)
ALP SERPL-CCNC: 263 U/L (ref 39–117)
ALP SERPL-CCNC: 288 U/L (ref 39–117)
ALP SERPL-CCNC: 314 U/L (ref 39–117)
ALT SERPL W P-5'-P-CCNC: 10 U/L (ref 1–33)
ALT SERPL W P-5'-P-CCNC: 11 U/L (ref 1–33)
ALT SERPL W P-5'-P-CCNC: 11 U/L (ref 1–33)
ALT SERPL W P-5'-P-CCNC: 13 U/L (ref 1–33)
ALT SERPL W P-5'-P-CCNC: 14 U/L (ref 1–33)
ALT SERPL W P-5'-P-CCNC: 15 U/L (ref 1–33)
ALT SERPL W P-5'-P-CCNC: 17 U/L (ref 1–33)
ALT SERPL W P-5'-P-CCNC: 17 U/L (ref 1–33)
ALT SERPL W P-5'-P-CCNC: 18 U/L (ref 1–33)
ALT SERPL W P-5'-P-CCNC: 19 U/L (ref 1–33)
ALT SERPL W P-5'-P-CCNC: 6 U/L (ref 1–33)
ALT SERPL W P-5'-P-CCNC: 8 U/L (ref 1–33)
ALT SERPL W P-5'-P-CCNC: 8 U/L (ref 1–33)
ALT SERPL W P-5'-P-CCNC: 9 U/L (ref 1–33)
ALT SERPL W P-5'-P-CCNC: 9 U/L (ref 1–33)
AMMONIA BLD-SCNC: 17 UMOL/L (ref 11–51)
AMMONIA BLD-SCNC: 17 UMOL/L (ref 11–51)
AMORPH URATE CRY URNS QL MICRO: ABNORMAL /HPF
AMPHET+METHAMPHET UR QL: NEGATIVE
AMPHET+METHAMPHET UR QL: NEGATIVE
AMPHETAMINES UR QL: NEGATIVE
AMPHETAMINES UR QL: NEGATIVE
ANION GAP SERPL CALCULATED.3IONS-SCNC: 10 MMOL/L (ref 5–15)
ANION GAP SERPL CALCULATED.3IONS-SCNC: 10 MMOL/L (ref 5–15)
ANION GAP SERPL CALCULATED.3IONS-SCNC: 11 MMOL/L (ref 5–15)
ANION GAP SERPL CALCULATED.3IONS-SCNC: 12 MMOL/L (ref 5–15)
ANION GAP SERPL CALCULATED.3IONS-SCNC: 13 MMOL/L (ref 5–15)
ANION GAP SERPL CALCULATED.3IONS-SCNC: 14 MMOL/L (ref 5–15)
ANION GAP SERPL CALCULATED.3IONS-SCNC: 15 MMOL/L (ref 5–15)
ANION GAP SERPL CALCULATED.3IONS-SCNC: 16 MMOL/L (ref 5–15)
ANION GAP SERPL CALCULATED.3IONS-SCNC: 17 MMOL/L (ref 5–15)
ANION GAP SERPL CALCULATED.3IONS-SCNC: 17 MMOL/L (ref 5–15)
ANION GAP SERPL CALCULATED.3IONS-SCNC: 19 MMOL/L (ref 5–15)
ANION GAP SERPL CALCULATED.3IONS-SCNC: 19 MMOL/L (ref 5–15)
ANION GAP SERPL CALCULATED.3IONS-SCNC: 20 MMOL/L (ref 5–15)
ANION GAP SERPL CALCULATED.3IONS-SCNC: 20 MMOL/L (ref 5–15)
ANISOCYTOSIS BLD QL: ABNORMAL
ANISOCYTOSIS BLD QL: NORMAL
APAP SERPL-MCNC: <5 MCG/ML (ref 0–30)
APAP SERPL-MCNC: <5 MCG/ML (ref 0–30)
APTT PPP: 32.6 SECONDS (ref 24.1–35)
APTT PPP: 34.4 SECONDS (ref 24.1–35)
APTT PPP: 38.2 SECONDS (ref 24.1–35)
APTT PPP: 44 SECONDS (ref 24.1–35)
APTT PPP: 49.2 SECONDS (ref 24.1–35)
APTT PPP: NORMAL S
ARTERIAL PATENCY WRIST A: POSITIVE
AST SERPL-CCNC: 15 U/L (ref 1–32)
AST SERPL-CCNC: 16 U/L (ref 1–32)
AST SERPL-CCNC: 16 U/L (ref 1–32)
AST SERPL-CCNC: 17 U/L (ref 1–32)
AST SERPL-CCNC: 19 U/L (ref 1–32)
AST SERPL-CCNC: 20 U/L (ref 1–32)
AST SERPL-CCNC: 20 U/L (ref 1–32)
AST SERPL-CCNC: 21 U/L (ref 1–32)
AST SERPL-CCNC: 23 U/L (ref 1–32)
AST SERPL-CCNC: 26 U/L (ref 1–32)
AST SERPL-CCNC: 27 U/L (ref 1–32)
AST SERPL-CCNC: 29 U/L (ref 1–32)
AST SERPL-CCNC: 30 U/L (ref 1–32)
AST SERPL-CCNC: 36 U/L (ref 1–32)
AST SERPL-CCNC: 40 U/L (ref 1–32)
ASTRO TYP 1-8 RNA STL QL NAA+NON-PROBE: NOT DETECTED
ASTRO TYP 1-8 RNA STL QL NAA+NON-PROBE: NOT DETECTED
ATMOSPHERIC PRESS: 748 MMHG
ATMOSPHERIC PRESS: 749 MMHG
ATMOSPHERIC PRESS: 749 MMHG
ATMOSPHERIC PRESS: 750 MMHG
ATMOSPHERIC PRESS: 753 MMHG
ATMOSPHERIC PRESS: 754 MMHG
ATMOSPHERIC PRESS: 758 MMHG
ATMOSPHERIC PRESS: 759 MMHG
BACTERIA BLD CULT: ABNORMAL
BACTERIA BLD CULT: ABNORMAL
BACTERIA SPEC AEROBE CULT: ABNORMAL
BACTERIA SPEC AEROBE CULT: NORMAL
BACTERIA UR QL AUTO: ABNORMAL /HPF
BARBITURATES UR QL SCN: NEGATIVE
BARBITURATES UR QL SCN: NEGATIVE
BASE EXCESS BLDA CALC-SCNC: -1.1 MMOL/L (ref 0–2)
BASE EXCESS BLDA CALC-SCNC: -3.2 MMOL/L (ref 0–2)
BASE EXCESS BLDA CALC-SCNC: 1.7 MMOL/L (ref 0–2)
BASE EXCESS BLDA CALC-SCNC: 1.7 MMOL/L (ref 0–2)
BASE EXCESS BLDA CALC-SCNC: 2.3 MMOL/L (ref 0–2)
BASE EXCESS BLDA CALC-SCNC: 4 MMOL/L (ref 0–2)
BASE EXCESS BLDA CALC-SCNC: 4 MMOL/L (ref 0–2)
BASE EXCESS BLDA CALC-SCNC: 5 MMOL/L (ref 0–2)
BASO STIPL COARSE BLD QL SMEAR: NORMAL
BASOPHILS # BLD AUTO: 0.01 10*3/MM3 (ref 0–0.2)
BASOPHILS # BLD AUTO: 0.02 10*3/MM3 (ref 0–0.2)
BASOPHILS # BLD AUTO: 0.03 10*3/MM3 (ref 0–0.2)
BASOPHILS # BLD AUTO: 0.04 10*3/MM3 (ref 0–0.2)
BASOPHILS # BLD AUTO: 0.05 10*3/MM3 (ref 0–0.2)
BASOPHILS # BLD MANUAL: 0.07 10*3/MM3 (ref 0–0.2)
BASOPHILS # BLD MANUAL: NORMAL 10*3/UL
BASOPHILS NFR BLD AUTO: 0.2 % (ref 0–1.5)
BASOPHILS NFR BLD AUTO: 0.3 % (ref 0–1.5)
BASOPHILS NFR BLD AUTO: 0.4 % (ref 0–1.5)
BASOPHILS NFR BLD AUTO: 0.5 % (ref 0–1.5)
BASOPHILS NFR BLD AUTO: 0.5 % (ref 0–1.5)
BASOPHILS NFR BLD AUTO: 0.6 % (ref 0–1.5)
BASOPHILS NFR BLD AUTO: 0.6 % (ref 0–1.5)
BASOPHILS NFR BLD AUTO: 0.7 % (ref 0–1.5)
BASOPHILS NFR BLD AUTO: 0.8 % (ref 0–1.5)
BASOPHILS NFR BLD AUTO: 0.8 % (ref 0–1.5)
BASOPHILS NFR BLD AUTO: 1 % (ref 0–1.5)
BASOPHILS NFR BLD AUTO: 1.1 % (ref 0–1.5)
BDY SITE: ABNORMAL
BENZODIAZ UR QL SCN: NEGATIVE
BENZODIAZ UR QL SCN: NEGATIVE
BH CV ECHO MEAS - AO MAX PG (FULL): 13.7 MMHG
BH CV ECHO MEAS - AO MAX PG: 18.1 MMHG
BH CV ECHO MEAS - AO MEAN PG (FULL): 6 MMHG
BH CV ECHO MEAS - AO MEAN PG: 8 MMHG
BH CV ECHO MEAS - AO ROOT AREA: 3.8 CM^2
BH CV ECHO MEAS - AO ROOT DIAM: 2.2 CM
BH CV ECHO MEAS - AO V2 MAX: 213 CM/SEC
BH CV ECHO MEAS - AO V2 MEAN: 129 CM/SEC
BH CV ECHO MEAS - AO V2 VTI: 38.7 CM
BH CV ECHO MEAS - AVA(I,A): 1.4 CM^2
BH CV ECHO MEAS - AVA(I,D): 1.4 CM^2
BH CV ECHO MEAS - AVA(V,A): 1.3 CM^2
BH CV ECHO MEAS - AVA(V,D): 1.3 CM^2
BH CV ECHO MEAS - BZI_METRIC_HEIGHT: 147.3 CM
BH CV ECHO MEAS - EDV(CUBED): 61.2 ML
BH CV ECHO MEAS - EDV(MOD-SP2): 98.2 ML
BH CV ECHO MEAS - EDV(MOD-SP4): 86.2 ML
BH CV ECHO MEAS - EDV(TEICH): 67.5 ML
BH CV ECHO MEAS - EF(CUBED): 66.4 %
BH CV ECHO MEAS - EF(MOD-SP2): 56.6 %
BH CV ECHO MEAS - EF(MOD-SP4): 50.5 %
BH CV ECHO MEAS - EF(TEICH): 58.5 %
BH CV ECHO MEAS - ESV(CUBED): 20.6 ML
BH CV ECHO MEAS - ESV(MOD-SP2): 42.6 ML
BH CV ECHO MEAS - ESV(MOD-SP4): 42.7 ML
BH CV ECHO MEAS - ESV(TEICH): 28 ML
BH CV ECHO MEAS - FS: 30.5 %
BH CV ECHO MEAS - IVS/LVPW: 1.1
BH CV ECHO MEAS - IVSD: 1.1 CM
BH CV ECHO MEAS - LA DIMENSION: 3.9 CM
BH CV ECHO MEAS - LA/AO: 1.8
BH CV ECHO MEAS - LAT PEAK E' VEL: 7.7 CM/SEC
BH CV ECHO MEAS - LV MASS(C)D: 130.9 GRAMS
BH CV ECHO MEAS - LV MAX PG: 4.5 MMHG
BH CV ECHO MEAS - LV MEAN PG: 2 MMHG
BH CV ECHO MEAS - LV V1 MAX: 106 CM/SEC
BH CV ECHO MEAS - LV V1 MEAN: 60.4 CM/SEC
BH CV ECHO MEAS - LV V1 VTI: 21.4 CM
BH CV ECHO MEAS - LVIDD: 3.9 CM
BH CV ECHO MEAS - LVIDS: 2.7 CM
BH CV ECHO MEAS - LVLD AP2: 8.4 CM
BH CV ECHO MEAS - LVLD AP4: 8.1 CM
BH CV ECHO MEAS - LVLS AP2: 7.4 CM
BH CV ECHO MEAS - LVLS AP4: 7.3 CM
BH CV ECHO MEAS - LVOT AREA (M): 2.5 CM^2
BH CV ECHO MEAS - LVOT AREA: 2.5 CM^2
BH CV ECHO MEAS - LVOT DIAM: 1.8 CM
BH CV ECHO MEAS - LVPWD: 0.98 CM
BH CV ECHO MEAS - MED PEAK E' VEL: 4.68 CM/SEC
BH CV ECHO MEAS - MR MAX PG: 148 MMHG
BH CV ECHO MEAS - MR MAX VEL: 608 CM/SEC
BH CV ECHO MEAS - MR MEAN PG: 101 MMHG
BH CV ECHO MEAS - MR MEAN VEL: 483 CM/SEC
BH CV ECHO MEAS - MR VTI: 186 CM
BH CV ECHO MEAS - MV A MAX VEL: 126 CM/SEC
BH CV ECHO MEAS - MV DEC TIME: 0.14 SEC
BH CV ECHO MEAS - MV E MAX VEL: 125 CM/SEC
BH CV ECHO MEAS - MV E/A: 0.99
BH CV ECHO MEAS - RAP SYSTOLE: 5 MMHG
BH CV ECHO MEAS - RVSP: 42 MMHG
BH CV ECHO MEAS - SV(AO): 147.1 ML
BH CV ECHO MEAS - SV(CUBED): 40.6 ML
BH CV ECHO MEAS - SV(LVOT): 54.5 ML
BH CV ECHO MEAS - SV(MOD-SP2): 55.6 ML
BH CV ECHO MEAS - SV(MOD-SP4): 43.5 ML
BH CV ECHO MEAS - SV(TEICH): 39.5 ML
BH CV ECHO MEAS - TR MAX VEL: 304 CM/SEC
BH CV ECHO MEASUREMENTS AVERAGE E/E' RATIO: 20.19
BILIRUB SERPL-MCNC: 0.3 MG/DL (ref 0–1.2)
BILIRUB SERPL-MCNC: 0.4 MG/DL (ref 0–1.2)
BILIRUB SERPL-MCNC: 0.5 MG/DL (ref 0–1.2)
BILIRUB SERPL-MCNC: 0.6 MG/DL (ref 0–1.2)
BILIRUB SERPL-MCNC: 0.6 MG/DL (ref 0–1.2)
BILIRUB SERPL-MCNC: 0.7 MG/DL (ref 0–1.2)
BILIRUB SERPL-MCNC: 0.8 MG/DL (ref 0–1.2)
BILIRUB SERPL-MCNC: 0.9 MG/DL (ref 0–1.2)
BILIRUB SERPL-MCNC: 0.9 MG/DL (ref 0–1.2)
BILIRUB SERPL-MCNC: 1.1 MG/DL (ref 0–1.2)
BILIRUB UR QL STRIP: ABNORMAL
BILIRUB UR QL STRIP: NEGATIVE
BILIRUB UR QL STRIP: NEGATIVE
BLD GP AB SCN SERPL QL: NEGATIVE
BODY TEMPERATURE: 37 C
BOTTLE TYPE: ABNORMAL
BOTTLE TYPE: ABNORMAL
BUN SERPL-MCNC: 14 MG/DL (ref 8–23)
BUN SERPL-MCNC: 15 MG/DL (ref 8–23)
BUN SERPL-MCNC: 16 MG/DL (ref 8–23)
BUN SERPL-MCNC: 16 MG/DL (ref 8–23)
BUN SERPL-MCNC: 18 MG/DL (ref 8–23)
BUN SERPL-MCNC: 21 MG/DL (ref 8–23)
BUN SERPL-MCNC: 22 MG/DL (ref 8–23)
BUN SERPL-MCNC: 23 MG/DL (ref 8–23)
BUN SERPL-MCNC: 23 MG/DL (ref 8–23)
BUN SERPL-MCNC: 24 MG/DL (ref 8–23)
BUN SERPL-MCNC: 24 MG/DL (ref 8–23)
BUN SERPL-MCNC: 25 MG/DL (ref 8–23)
BUN SERPL-MCNC: 25 MG/DL (ref 8–23)
BUN SERPL-MCNC: 26 MG/DL (ref 8–23)
BUN SERPL-MCNC: 27 MG/DL (ref 8–23)
BUN SERPL-MCNC: 28 MG/DL (ref 8–23)
BUN SERPL-MCNC: 28 MG/DL (ref 8–23)
BUN SERPL-MCNC: 29 MG/DL (ref 8–23)
BUN SERPL-MCNC: 30 MG/DL (ref 8–23)
BUN SERPL-MCNC: 31 MG/DL (ref 8–23)
BUN SERPL-MCNC: 35 MG/DL (ref 8–23)
BUN SERPL-MCNC: 35 MG/DL (ref 8–23)
BUN SERPL-MCNC: 36 MG/DL (ref 8–23)
BUN SERPL-MCNC: 39 MG/DL (ref 8–23)
BUN SERPL-MCNC: 45 MG/DL (ref 8–23)
BUN SERPL-MCNC: 47 MG/DL (ref 8–23)
BUN SERPL-MCNC: 56 MG/DL (ref 8–23)
BUN SERPL-MCNC: 56 MG/DL (ref 8–23)
BUN/CREAT SERPL: 4.8 (ref 7–25)
BUN/CREAT SERPL: 5 (ref 7–25)
BUN/CREAT SERPL: 5.2 (ref 7–25)
BUN/CREAT SERPL: 5.6 (ref 7–25)
BUN/CREAT SERPL: 5.7 (ref 7–25)
BUN/CREAT SERPL: 5.7 (ref 7–25)
BUN/CREAT SERPL: 6.1 (ref 7–25)
BUN/CREAT SERPL: 6.1 (ref 7–25)
BUN/CREAT SERPL: 6.2 (ref 7–25)
BUN/CREAT SERPL: 6.4 (ref 7–25)
BUN/CREAT SERPL: 6.5 (ref 7–25)
BUN/CREAT SERPL: 6.6 (ref 7–25)
BUN/CREAT SERPL: 6.6 (ref 7–25)
BUN/CREAT SERPL: 6.7 (ref 7–25)
BUN/CREAT SERPL: 6.7 (ref 7–25)
BUN/CREAT SERPL: 6.8 (ref 7–25)
BUN/CREAT SERPL: 6.8 (ref 7–25)
BUN/CREAT SERPL: 6.9 (ref 7–25)
BUN/CREAT SERPL: 7 (ref 7–25)
BUN/CREAT SERPL: 7.1 (ref 7–25)
BUN/CREAT SERPL: 7.2 (ref 7–25)
BUN/CREAT SERPL: 7.2 (ref 7–25)
BUN/CREAT SERPL: 7.3 (ref 7–25)
BUN/CREAT SERPL: 7.6 (ref 7–25)
BUN/CREAT SERPL: 7.8 (ref 7–25)
BUN/CREAT SERPL: 7.9 (ref 7–25)
BUN/CREAT SERPL: 8 (ref 7–25)
BUN/CREAT SERPL: 8.2 (ref 7–25)
BUN/CREAT SERPL: 8.4 (ref 7–25)
BUN/CREAT SERPL: 8.8 (ref 7–25)
BUN/CREAT SERPL: 9.3 (ref 7–25)
BUPRENORPHINE SERPL-MCNC: NEGATIVE NG/ML
BUPRENORPHINE SERPL-MCNC: NEGATIVE NG/ML
BURR CELLS BLD QL SMEAR: NORMAL
C CAYETANENSIS DNA STL QL NAA+NON-PROBE: NOT DETECTED
C CAYETANENSIS DNA STL QL NAA+NON-PROBE: NOT DETECTED
C COLI+JEJ+UPSA DNA STL QL NAA+NON-PROBE: NOT DETECTED
C COLI+JEJ+UPSA DNA STL QL NAA+NON-PROBE: NOT DETECTED
C DIFF TOX GENS STL QL NAA+PROBE: POSITIVE
C DIFF TOX GENS STL QL NAA+PROBE: POSITIVE
CALCIUM SPEC-SCNC: 10.1 MG/DL (ref 8.6–10.5)
CALCIUM SPEC-SCNC: 8.3 MG/DL (ref 8.6–10.5)
CALCIUM SPEC-SCNC: 8.6 MG/DL (ref 8.6–10.5)
CALCIUM SPEC-SCNC: 8.6 MG/DL (ref 8.6–10.5)
CALCIUM SPEC-SCNC: 8.7 MG/DL (ref 8.6–10.5)
CALCIUM SPEC-SCNC: 8.8 MG/DL (ref 8.6–10.5)
CALCIUM SPEC-SCNC: 8.9 MG/DL (ref 8.6–10.5)
CALCIUM SPEC-SCNC: 8.9 MG/DL (ref 8.6–10.5)
CALCIUM SPEC-SCNC: 9 MG/DL (ref 8.6–10.5)
CALCIUM SPEC-SCNC: 9.1 MG/DL (ref 8.6–10.5)
CALCIUM SPEC-SCNC: 9.2 MG/DL (ref 8.6–10.5)
CALCIUM SPEC-SCNC: 9.3 MG/DL (ref 8.6–10.5)
CALCIUM SPEC-SCNC: 9.4 MG/DL (ref 8.6–10.5)
CALCIUM SPEC-SCNC: 9.4 MG/DL (ref 8.6–10.5)
CALCIUM SPEC-SCNC: 9.5 MG/DL (ref 8.6–10.5)
CALCIUM SPEC-SCNC: 9.6 MG/DL (ref 8.6–10.5)
CALCIUM SPEC-SCNC: 9.7 MG/DL (ref 8.6–10.5)
CALCIUM SPEC-SCNC: 9.8 MG/DL (ref 8.6–10.5)
CANNABINOIDS SERPL QL: NEGATIVE
CANNABINOIDS SERPL QL: NEGATIVE
CHLORIDE SERPL-SCNC: 81 MMOL/L (ref 98–107)
CHLORIDE SERPL-SCNC: 82 MMOL/L (ref 98–107)
CHLORIDE SERPL-SCNC: 85 MMOL/L (ref 98–107)
CHLORIDE SERPL-SCNC: 88 MMOL/L (ref 98–107)
CHLORIDE SERPL-SCNC: 88 MMOL/L (ref 98–107)
CHLORIDE SERPL-SCNC: 89 MMOL/L (ref 98–107)
CHLORIDE SERPL-SCNC: 89 MMOL/L (ref 98–107)
CHLORIDE SERPL-SCNC: 90 MMOL/L (ref 98–107)
CHLORIDE SERPL-SCNC: 90 MMOL/L (ref 98–107)
CHLORIDE SERPL-SCNC: 91 MMOL/L (ref 98–107)
CHLORIDE SERPL-SCNC: 92 MMOL/L (ref 98–107)
CHLORIDE SERPL-SCNC: 92 MMOL/L (ref 98–107)
CHLORIDE SERPL-SCNC: 93 MMOL/L (ref 98–107)
CHLORIDE SERPL-SCNC: 93 MMOL/L (ref 98–107)
CHLORIDE SERPL-SCNC: 94 MMOL/L (ref 98–107)
CHLORIDE SERPL-SCNC: 95 MMOL/L (ref 98–107)
CHLORIDE SERPL-SCNC: 96 MMOL/L (ref 98–107)
CHLORIDE SERPL-SCNC: 96 MMOL/L (ref 98–107)
CHLORIDE SERPL-SCNC: 97 MMOL/L (ref 98–107)
CHLORIDE SERPL-SCNC: 97 MMOL/L (ref 98–107)
CHLORIDE SERPL-SCNC: 98 MMOL/L (ref 98–107)
CHLORIDE SERPL-SCNC: 98 MMOL/L (ref 98–107)
CHLORIDE SERPL-SCNC: 99 MMOL/L (ref 98–107)
CK SERPL-CCNC: 37 U/L (ref 20–180)
CLARITY UR: CLEAR
CO2 SERPL-SCNC: 19 MMOL/L (ref 22–29)
CO2 SERPL-SCNC: 19 MMOL/L (ref 22–29)
CO2 SERPL-SCNC: 21 MMOL/L (ref 22–29)
CO2 SERPL-SCNC: 22 MMOL/L (ref 22–29)
CO2 SERPL-SCNC: 23 MMOL/L (ref 22–29)
CO2 SERPL-SCNC: 23 MMOL/L (ref 22–29)
CO2 SERPL-SCNC: 24 MMOL/L (ref 22–29)
CO2 SERPL-SCNC: 25 MMOL/L (ref 22–29)
CO2 SERPL-SCNC: 26 MMOL/L (ref 22–29)
CO2 SERPL-SCNC: 27 MMOL/L (ref 22–29)
CO2 SERPL-SCNC: 27 MMOL/L (ref 22–29)
CO2 SERPL-SCNC: 28 MMOL/L (ref 22–29)
CO2 SERPL-SCNC: 29 MMOL/L (ref 22–29)
CO2 SERPL-SCNC: 30 MMOL/L (ref 22–29)
COCAINE UR QL: NEGATIVE
COCAINE UR QL: NEGATIVE
COLOR UR: ABNORMAL
COLOR UR: ABNORMAL
COLOR UR: YELLOW
CREAT SERPL-MCNC: 2.4 MG/DL (ref 0.57–1)
CREAT SERPL-MCNC: 2.43 MG/DL (ref 0.57–1)
CREAT SERPL-MCNC: 2.69 MG/DL (ref 0.57–1)
CREAT SERPL-MCNC: 2.93 MG/DL (ref 0.57–1)
CREAT SERPL-MCNC: 2.97 MG/DL (ref 0.57–1)
CREAT SERPL-MCNC: 2.98 MG/DL (ref 0.57–1)
CREAT SERPL-MCNC: 2.98 MG/DL (ref 0.57–1)
CREAT SERPL-MCNC: 3.21 MG/DL (ref 0.57–1)
CREAT SERPL-MCNC: 3.24 MG/DL (ref 0.57–1)
CREAT SERPL-MCNC: 3.3 MG/DL (ref 0.57–1)
CREAT SERPL-MCNC: 3.42 MG/DL (ref 0.57–1)
CREAT SERPL-MCNC: 3.59 MG/DL (ref 0.57–1)
CREAT SERPL-MCNC: 3.69 MG/DL (ref 0.57–1)
CREAT SERPL-MCNC: 3.69 MG/DL (ref 0.57–1)
CREAT SERPL-MCNC: 3.72 MG/DL (ref 0.57–1)
CREAT SERPL-MCNC: 3.74 MG/DL (ref 0.57–1)
CREAT SERPL-MCNC: 3.75 MG/DL (ref 0.57–1)
CREAT SERPL-MCNC: 3.9 MG/DL (ref 0.57–1)
CREAT SERPL-MCNC: 3.93 MG/DL (ref 0.57–1)
CREAT SERPL-MCNC: 4.02 MG/DL (ref 0.57–1)
CREAT SERPL-MCNC: 4.06 MG/DL (ref 0.57–1)
CREAT SERPL-MCNC: 4.09 MG/DL (ref 0.57–1)
CREAT SERPL-MCNC: 4.12 MG/DL (ref 0.57–1)
CREAT SERPL-MCNC: 4.26 MG/DL (ref 0.57–1)
CREAT SERPL-MCNC: 4.31 MG/DL (ref 0.57–1)
CREAT SERPL-MCNC: 4.33 MG/DL (ref 0.57–1)
CREAT SERPL-MCNC: 4.38 MG/DL (ref 0.57–1)
CREAT SERPL-MCNC: 4.66 MG/DL (ref 0.57–1)
CREAT SERPL-MCNC: 4.78 MG/DL (ref 0.57–1)
CREAT SERPL-MCNC: 4.85 MG/DL (ref 0.57–1)
CREAT SERPL-MCNC: 4.96 MG/DL (ref 0.57–1)
CREAT SERPL-MCNC: 5.26 MG/DL (ref 0.57–1)
CREAT SERPL-MCNC: 5.52 MG/DL (ref 0.57–1)
CREAT SERPL-MCNC: 5.91 MG/DL (ref 0.57–1)
CREAT SERPL-MCNC: 6.34 MG/DL (ref 0.57–1)
CREAT SERPL-MCNC: 6.67 MG/DL (ref 0.57–1)
CRP SERPL-MCNC: 6.77 MG/DL (ref 0–0.5)
CRYPTOSP DNA STL QL NAA+NON-PROBE: NOT DETECTED
CRYPTOSP DNA STL QL NAA+NON-PROBE: NOT DETECTED
D-LACTATE SERPL-SCNC: 1 MMOL/L (ref 0.5–2)
D-LACTATE SERPL-SCNC: 1.4 MMOL/L (ref 0.5–2)
D-LACTATE SERPL-SCNC: 1.6 MMOL/L (ref 0.5–2)
D-LACTATE SERPL-SCNC: 2.3 MMOL/L (ref 0.5–2)
D-LACTATE SERPL-SCNC: 2.8 MMOL/L (ref 0.5–2)
D-LACTATE SERPL-SCNC: NORMAL MMOL/L
DEPRECATED RDW RBC AUTO: 51.7 FL (ref 37–54)
DEPRECATED RDW RBC AUTO: 55.5 FL (ref 37–54)
DEPRECATED RDW RBC AUTO: 56.2 FL (ref 37–54)
DEPRECATED RDW RBC AUTO: 56.4 FL (ref 37–54)
DEPRECATED RDW RBC AUTO: 57.3 FL (ref 37–54)
DEPRECATED RDW RBC AUTO: 58 FL (ref 37–54)
DEPRECATED RDW RBC AUTO: 59.2 FL (ref 37–54)
DEPRECATED RDW RBC AUTO: 59.6 FL (ref 37–54)
DEPRECATED RDW RBC AUTO: 59.7 FL (ref 37–54)
DEPRECATED RDW RBC AUTO: 59.9 FL (ref 37–54)
DEPRECATED RDW RBC AUTO: 60.2 FL (ref 37–54)
DEPRECATED RDW RBC AUTO: 60.4 FL (ref 37–54)
DEPRECATED RDW RBC AUTO: 61.1 FL (ref 37–54)
DEPRECATED RDW RBC AUTO: 61.7 FL (ref 37–54)
DEPRECATED RDW RBC AUTO: 61.8 FL (ref 37–54)
DEPRECATED RDW RBC AUTO: 61.8 FL (ref 37–54)
DEPRECATED RDW RBC AUTO: 62 FL (ref 37–54)
DEPRECATED RDW RBC AUTO: 62.3 FL (ref 37–54)
DEPRECATED RDW RBC AUTO: 63 FL (ref 37–54)
DEPRECATED RDW RBC AUTO: 63 FL (ref 37–54)
DEPRECATED RDW RBC AUTO: 63.7 FL (ref 37–54)
DEPRECATED RDW RBC AUTO: 63.9 FL (ref 37–54)
DEPRECATED RDW RBC AUTO: 64.7 FL (ref 37–54)
DEPRECATED RDW RBC AUTO: 65 FL (ref 37–54)
DEPRECATED RDW RBC AUTO: 65.3 FL (ref 37–54)
DEPRECATED RDW RBC AUTO: 65.7 FL (ref 37–54)
DEPRECATED RDW RBC AUTO: 66.3 FL (ref 37–54)
DEPRECATED RDW RBC AUTO: 66.9 FL (ref 37–54)
DEPRECATED RDW RBC AUTO: 68.2 FL (ref 37–54)
DEPRECATED RDW RBC AUTO: NORMAL FL
DEVELOPER EXPIRATION DATE: ABNORMAL
DEVELOPER LOT NUMBER: 195
E HISTOLYT DNA STL QL NAA+NON-PROBE: NOT DETECTED
E HISTOLYT DNA STL QL NAA+NON-PROBE: NOT DETECTED
EAEC PAA PLAS AGGR+AATA ST NAA+NON-PRB: NOT DETECTED
EAEC PAA PLAS AGGR+AATA ST NAA+NON-PRB: NOT DETECTED
EC STX1+STX2 GENES STL QL NAA+NON-PROBE: NOT DETECTED
EC STX1+STX2 GENES STL QL NAA+NON-PROBE: NOT DETECTED
EOSINOPHIL # BLD AUTO: 0 10*3/MM3 (ref 0–0.4)
EOSINOPHIL # BLD AUTO: 0.02 10*3/MM3 (ref 0–0.4)
EOSINOPHIL # BLD AUTO: 0.04 10*3/MM3 (ref 0–0.4)
EOSINOPHIL # BLD AUTO: 0.05 10*3/MM3 (ref 0–0.4)
EOSINOPHIL # BLD AUTO: 0.05 10*3/MM3 (ref 0–0.4)
EOSINOPHIL # BLD AUTO: 0.07 10*3/MM3 (ref 0–0.4)
EOSINOPHIL # BLD AUTO: 0.09 10*3/MM3 (ref 0–0.4)
EOSINOPHIL # BLD AUTO: 0.1 10*3/MM3 (ref 0–0.4)
EOSINOPHIL # BLD AUTO: 0.1 10*3/MM3 (ref 0–0.4)
EOSINOPHIL # BLD AUTO: 0.13 10*3/MM3 (ref 0–0.4)
EOSINOPHIL # BLD AUTO: 0.13 10*3/MM3 (ref 0–0.4)
EOSINOPHIL # BLD AUTO: 0.14 10*3/MM3 (ref 0–0.4)
EOSINOPHIL # BLD AUTO: 0.17 10*3/MM3 (ref 0–0.4)
EOSINOPHIL # BLD AUTO: 0.19 10*3/MM3 (ref 0–0.4)
EOSINOPHIL # BLD AUTO: 0.19 10*3/MM3 (ref 0–0.4)
EOSINOPHIL # BLD AUTO: 0.22 10*3/MM3 (ref 0–0.4)
EOSINOPHIL # BLD AUTO: 0.46 10*3/MM3 (ref 0–0.4)
EOSINOPHIL # BLD AUTO: 0.52 10*3/MM3 (ref 0–0.4)
EOSINOPHIL # BLD MANUAL: 0.08 10*3/MM3 (ref 0–0.4)
EOSINOPHIL NFR BLD AUTO: 0 % (ref 0.3–6.2)
EOSINOPHIL NFR BLD AUTO: 0.2 % (ref 0.3–6.2)
EOSINOPHIL NFR BLD AUTO: 0.2 % (ref 0.3–6.2)
EOSINOPHIL NFR BLD AUTO: 0.5 % (ref 0.3–6.2)
EOSINOPHIL NFR BLD AUTO: 1.5 % (ref 0.3–6.2)
EOSINOPHIL NFR BLD AUTO: 1.7 % (ref 0.3–6.2)
EOSINOPHIL NFR BLD AUTO: 2.4 % (ref 0.3–6.2)
EOSINOPHIL NFR BLD AUTO: 2.5 % (ref 0.3–6.2)
EOSINOPHIL NFR BLD AUTO: 2.6 % (ref 0.3–6.2)
EOSINOPHIL NFR BLD AUTO: 3.3 % (ref 0.3–6.2)
EOSINOPHIL NFR BLD AUTO: 3.3 % (ref 0.3–6.2)
EOSINOPHIL NFR BLD AUTO: 3.4 % (ref 0.3–6.2)
EOSINOPHIL NFR BLD AUTO: 4.4 % (ref 0.3–6.2)
EOSINOPHIL NFR BLD AUTO: 4.8 % (ref 0.3–6.2)
EOSINOPHIL NFR BLD AUTO: 5.1 % (ref 0.3–6.2)
EOSINOPHIL NFR BLD AUTO: 5.8 % (ref 0.3–6.2)
EOSINOPHIL NFR BLD AUTO: 7 % (ref 0.3–6.2)
EOSINOPHIL NFR BLD AUTO: 7.7 % (ref 0.3–6.2)
EOSINOPHIL NFR BLD MANUAL: 1 % (ref 0.3–6.2)
EPAP: 6
EPEC EAE GENE STL QL NAA+NON-PROBE: NOT DETECTED
EPEC EAE GENE STL QL NAA+NON-PROBE: NOT DETECTED
ERYTHROCYTE [DISTWIDTH] IN BLOOD BY AUTOMATED COUNT: 15.6 % (ref 12.3–15.4)
ERYTHROCYTE [DISTWIDTH] IN BLOOD BY AUTOMATED COUNT: 15.8 % (ref 12.3–15.4)
ERYTHROCYTE [DISTWIDTH] IN BLOOD BY AUTOMATED COUNT: 15.9 % (ref 12.3–15.4)
ERYTHROCYTE [DISTWIDTH] IN BLOOD BY AUTOMATED COUNT: 16 % (ref 12.3–15.4)
ERYTHROCYTE [DISTWIDTH] IN BLOOD BY AUTOMATED COUNT: 16.2 % (ref 12.3–15.4)
ERYTHROCYTE [DISTWIDTH] IN BLOOD BY AUTOMATED COUNT: 16.6 % (ref 12.3–15.4)
ERYTHROCYTE [DISTWIDTH] IN BLOOD BY AUTOMATED COUNT: 16.7 % (ref 12.3–15.4)
ERYTHROCYTE [DISTWIDTH] IN BLOOD BY AUTOMATED COUNT: 16.9 % (ref 12.3–15.4)
ERYTHROCYTE [DISTWIDTH] IN BLOOD BY AUTOMATED COUNT: 17 % (ref 12.3–15.4)
ERYTHROCYTE [DISTWIDTH] IN BLOOD BY AUTOMATED COUNT: 17.1 % (ref 12.3–15.4)
ERYTHROCYTE [DISTWIDTH] IN BLOOD BY AUTOMATED COUNT: 17.2 % (ref 12.3–15.4)
ERYTHROCYTE [DISTWIDTH] IN BLOOD BY AUTOMATED COUNT: 17.5 % (ref 12.3–15.4)
ERYTHROCYTE [DISTWIDTH] IN BLOOD BY AUTOMATED COUNT: 17.5 % (ref 12.3–15.4)
ERYTHROCYTE [DISTWIDTH] IN BLOOD BY AUTOMATED COUNT: 17.6 % (ref 12.3–15.4)
ERYTHROCYTE [DISTWIDTH] IN BLOOD BY AUTOMATED COUNT: 17.6 % (ref 12.3–15.4)
ERYTHROCYTE [DISTWIDTH] IN BLOOD BY AUTOMATED COUNT: 17.7 % (ref 12.3–15.4)
ERYTHROCYTE [DISTWIDTH] IN BLOOD BY AUTOMATED COUNT: 17.8 % (ref 12.3–15.4)
ERYTHROCYTE [DISTWIDTH] IN BLOOD BY AUTOMATED COUNT: 17.9 % (ref 12.3–15.4)
ERYTHROCYTE [DISTWIDTH] IN BLOOD BY AUTOMATED COUNT: 18 % (ref 12.3–15.4)
ERYTHROCYTE [DISTWIDTH] IN BLOOD BY AUTOMATED COUNT: 18.1 % (ref 12.3–15.4)
ERYTHROCYTE [DISTWIDTH] IN BLOOD BY AUTOMATED COUNT: 18.3 % (ref 12.3–15.4)
ERYTHROCYTE [DISTWIDTH] IN BLOOD BY AUTOMATED COUNT: 18.6 % (ref 12.3–15.4)
ERYTHROCYTE [DISTWIDTH] IN BLOOD BY AUTOMATED COUNT: NORMAL %
ETEC LTA+ST1A+ST1B TOX ST NAA+NON-PROBE: NOT DETECTED
ETEC LTA+ST1A+ST1B TOX ST NAA+NON-PROBE: NOT DETECTED
ETHANOL UR QL: <0.01 %
ETHANOL UR QL: <0.01 %
ETHANOL UR QL: NORMAL
EXPIRATION DATE: ABNORMAL
FECAL OCCULT BLOOD SCREEN, POC: POSITIVE
FERRITIN SERPL-MCNC: 641.9 NG/ML (ref 13–150)
FOLATE SERPL-MCNC: 8.59 NG/ML (ref 4.78–24.2)
G LAMBLIA DNA STL QL NAA+NON-PROBE: NOT DETECTED
G LAMBLIA DNA STL QL NAA+NON-PROBE: NOT DETECTED
GAS FLOW AIRWAY: 2 LPM
GFR SERPL CREATININE-BSD FRML MDRD: 10 ML/MIN/1.73
GFR SERPL CREATININE-BSD FRML MDRD: 11 ML/MIN/1.73
GFR SERPL CREATININE-BSD FRML MDRD: 12 ML/MIN/1.73
GFR SERPL CREATININE-BSD FRML MDRD: 13 ML/MIN/1.73
GFR SERPL CREATININE-BSD FRML MDRD: 14 ML/MIN/1.73
GFR SERPL CREATININE-BSD FRML MDRD: 15 ML/MIN/1.73
GFR SERPL CREATININE-BSD FRML MDRD: 16 ML/MIN/1.73
GFR SERPL CREATININE-BSD FRML MDRD: 18 ML/MIN/1.73
GFR SERPL CREATININE-BSD FRML MDRD: 20 ML/MIN/1.73
GFR SERPL CREATININE-BSD FRML MDRD: 20 ML/MIN/1.73
GFR SERPL CREATININE-BSD FRML MDRD: 6 ML/MIN/1.73
GFR SERPL CREATININE-BSD FRML MDRD: 7 ML/MIN/1.73
GFR SERPL CREATININE-BSD FRML MDRD: 7 ML/MIN/1.73
GFR SERPL CREATININE-BSD FRML MDRD: 8 ML/MIN/1.73
GFR SERPL CREATININE-BSD FRML MDRD: 8 ML/MIN/1.73
GFR SERPL CREATININE-BSD FRML MDRD: 9 ML/MIN/1.73
GFR SERPL CREATININE-BSD FRML MDRD: ABNORMAL ML/MIN/{1.73_M2}
GLOBULIN UR ELPH-MCNC: 2.3 GM/DL
GLOBULIN UR ELPH-MCNC: 2.3 GM/DL
GLOBULIN UR ELPH-MCNC: 2.4 GM/DL
GLOBULIN UR ELPH-MCNC: 2.6 GM/DL
GLOBULIN UR ELPH-MCNC: 2.7 GM/DL
GLOBULIN UR ELPH-MCNC: 2.8 GM/DL
GLOBULIN UR ELPH-MCNC: 2.8 GM/DL
GLOBULIN UR ELPH-MCNC: 3 GM/DL
GLOBULIN UR ELPH-MCNC: 3.2 GM/DL
GLOBULIN UR ELPH-MCNC: 3.4 GM/DL
GLUCOSE BLDC GLUCOMTR-MCNC: 101 MG/DL (ref 70–130)
GLUCOSE BLDC GLUCOMTR-MCNC: 102 MG/DL (ref 70–130)
GLUCOSE BLDC GLUCOMTR-MCNC: 103 MG/DL (ref 70–130)
GLUCOSE BLDC GLUCOMTR-MCNC: 103 MG/DL (ref 70–130)
GLUCOSE BLDC GLUCOMTR-MCNC: 104 MG/DL (ref 70–130)
GLUCOSE BLDC GLUCOMTR-MCNC: 110 MG/DL (ref 70–130)
GLUCOSE BLDC GLUCOMTR-MCNC: 113 MG/DL (ref 70–130)
GLUCOSE BLDC GLUCOMTR-MCNC: 114 MG/DL (ref 70–130)
GLUCOSE BLDC GLUCOMTR-MCNC: 116 MG/DL (ref 70–130)
GLUCOSE BLDC GLUCOMTR-MCNC: 119 MG/DL (ref 70–130)
GLUCOSE BLDC GLUCOMTR-MCNC: 122 MG/DL (ref 70–130)
GLUCOSE BLDC GLUCOMTR-MCNC: 122 MG/DL (ref 70–130)
GLUCOSE BLDC GLUCOMTR-MCNC: 123 MG/DL (ref 70–130)
GLUCOSE BLDC GLUCOMTR-MCNC: 123 MG/DL (ref 70–130)
GLUCOSE BLDC GLUCOMTR-MCNC: 124 MG/DL (ref 70–130)
GLUCOSE BLDC GLUCOMTR-MCNC: 125 MG/DL (ref 70–130)
GLUCOSE BLDC GLUCOMTR-MCNC: 126 MG/DL (ref 70–130)
GLUCOSE BLDC GLUCOMTR-MCNC: 128 MG/DL (ref 70–130)
GLUCOSE BLDC GLUCOMTR-MCNC: 129 MG/DL (ref 70–130)
GLUCOSE BLDC GLUCOMTR-MCNC: 129 MG/DL (ref 70–130)
GLUCOSE BLDC GLUCOMTR-MCNC: 130 MG/DL (ref 70–130)
GLUCOSE BLDC GLUCOMTR-MCNC: 130 MG/DL (ref 70–130)
GLUCOSE BLDC GLUCOMTR-MCNC: 131 MG/DL (ref 70–130)
GLUCOSE BLDC GLUCOMTR-MCNC: 131 MG/DL (ref 70–130)
GLUCOSE BLDC GLUCOMTR-MCNC: 132 MG/DL (ref 70–130)
GLUCOSE BLDC GLUCOMTR-MCNC: 133 MG/DL (ref 70–130)
GLUCOSE BLDC GLUCOMTR-MCNC: 134 MG/DL (ref 70–130)
GLUCOSE BLDC GLUCOMTR-MCNC: 138 MG/DL (ref 70–130)
GLUCOSE BLDC GLUCOMTR-MCNC: 139 MG/DL (ref 70–130)
GLUCOSE BLDC GLUCOMTR-MCNC: 141 MG/DL (ref 70–130)
GLUCOSE BLDC GLUCOMTR-MCNC: 142 MG/DL (ref 70–130)
GLUCOSE BLDC GLUCOMTR-MCNC: 143 MG/DL (ref 70–130)
GLUCOSE BLDC GLUCOMTR-MCNC: 144 MG/DL (ref 70–130)
GLUCOSE BLDC GLUCOMTR-MCNC: 145 MG/DL (ref 70–130)
GLUCOSE BLDC GLUCOMTR-MCNC: 149 MG/DL (ref 70–130)
GLUCOSE BLDC GLUCOMTR-MCNC: 149 MG/DL (ref 70–130)
GLUCOSE BLDC GLUCOMTR-MCNC: 150 MG/DL (ref 70–130)
GLUCOSE BLDC GLUCOMTR-MCNC: 150 MG/DL (ref 70–130)
GLUCOSE BLDC GLUCOMTR-MCNC: 151 MG/DL (ref 70–130)
GLUCOSE BLDC GLUCOMTR-MCNC: 152 MG/DL (ref 70–130)
GLUCOSE BLDC GLUCOMTR-MCNC: 152 MG/DL (ref 70–130)
GLUCOSE BLDC GLUCOMTR-MCNC: 153 MG/DL (ref 70–130)
GLUCOSE BLDC GLUCOMTR-MCNC: 153 MG/DL (ref 70–130)
GLUCOSE BLDC GLUCOMTR-MCNC: 156 MG/DL (ref 70–130)
GLUCOSE BLDC GLUCOMTR-MCNC: 157 MG/DL (ref 70–130)
GLUCOSE BLDC GLUCOMTR-MCNC: 158 MG/DL (ref 70–130)
GLUCOSE BLDC GLUCOMTR-MCNC: 159 MG/DL (ref 70–130)
GLUCOSE BLDC GLUCOMTR-MCNC: 164 MG/DL (ref 70–130)
GLUCOSE BLDC GLUCOMTR-MCNC: 165 MG/DL (ref 70–130)
GLUCOSE BLDC GLUCOMTR-MCNC: 167 MG/DL (ref 70–130)
GLUCOSE BLDC GLUCOMTR-MCNC: 168 MG/DL (ref 70–130)
GLUCOSE BLDC GLUCOMTR-MCNC: 169 MG/DL (ref 70–130)
GLUCOSE BLDC GLUCOMTR-MCNC: 170 MG/DL (ref 70–130)
GLUCOSE BLDC GLUCOMTR-MCNC: 170 MG/DL (ref 70–130)
GLUCOSE BLDC GLUCOMTR-MCNC: 173 MG/DL (ref 70–130)
GLUCOSE BLDC GLUCOMTR-MCNC: 173 MG/DL (ref 70–130)
GLUCOSE BLDC GLUCOMTR-MCNC: 174 MG/DL (ref 70–130)
GLUCOSE BLDC GLUCOMTR-MCNC: 176 MG/DL (ref 70–130)
GLUCOSE BLDC GLUCOMTR-MCNC: 176 MG/DL (ref 70–130)
GLUCOSE BLDC GLUCOMTR-MCNC: 179 MG/DL (ref 70–130)
GLUCOSE BLDC GLUCOMTR-MCNC: 181 MG/DL (ref 70–130)
GLUCOSE BLDC GLUCOMTR-MCNC: 182 MG/DL (ref 70–130)
GLUCOSE BLDC GLUCOMTR-MCNC: 184 MG/DL (ref 70–130)
GLUCOSE BLDC GLUCOMTR-MCNC: 185 MG/DL (ref 70–130)
GLUCOSE BLDC GLUCOMTR-MCNC: 186 MG/DL (ref 70–130)
GLUCOSE BLDC GLUCOMTR-MCNC: 187 MG/DL (ref 70–130)
GLUCOSE BLDC GLUCOMTR-MCNC: 190 MG/DL (ref 70–130)
GLUCOSE BLDC GLUCOMTR-MCNC: 192 MG/DL (ref 70–130)
GLUCOSE BLDC GLUCOMTR-MCNC: 194 MG/DL (ref 70–130)
GLUCOSE BLDC GLUCOMTR-MCNC: 195 MG/DL (ref 70–130)
GLUCOSE BLDC GLUCOMTR-MCNC: 197 MG/DL (ref 70–130)
GLUCOSE BLDC GLUCOMTR-MCNC: 198 MG/DL (ref 70–130)
GLUCOSE BLDC GLUCOMTR-MCNC: 201 MG/DL (ref 70–130)
GLUCOSE BLDC GLUCOMTR-MCNC: 202 MG/DL (ref 70–130)
GLUCOSE BLDC GLUCOMTR-MCNC: 207 MG/DL (ref 70–130)
GLUCOSE BLDC GLUCOMTR-MCNC: 210 MG/DL (ref 70–130)
GLUCOSE BLDC GLUCOMTR-MCNC: 210 MG/DL (ref 70–130)
GLUCOSE BLDC GLUCOMTR-MCNC: 211 MG/DL (ref 70–130)
GLUCOSE BLDC GLUCOMTR-MCNC: 211 MG/DL (ref 70–130)
GLUCOSE BLDC GLUCOMTR-MCNC: 212 MG/DL (ref 70–130)
GLUCOSE BLDC GLUCOMTR-MCNC: 213 MG/DL (ref 70–130)
GLUCOSE BLDC GLUCOMTR-MCNC: 216 MG/DL (ref 70–130)
GLUCOSE BLDC GLUCOMTR-MCNC: 217 MG/DL (ref 70–130)
GLUCOSE BLDC GLUCOMTR-MCNC: 220 MG/DL (ref 70–130)
GLUCOSE BLDC GLUCOMTR-MCNC: 222 MG/DL (ref 70–130)
GLUCOSE BLDC GLUCOMTR-MCNC: 223 MG/DL (ref 70–130)
GLUCOSE BLDC GLUCOMTR-MCNC: 225 MG/DL (ref 70–130)
GLUCOSE BLDC GLUCOMTR-MCNC: 226 MG/DL (ref 70–130)
GLUCOSE BLDC GLUCOMTR-MCNC: 233 MG/DL (ref 70–130)
GLUCOSE BLDC GLUCOMTR-MCNC: 237 MG/DL (ref 70–130)
GLUCOSE BLDC GLUCOMTR-MCNC: 240 MG/DL (ref 70–130)
GLUCOSE BLDC GLUCOMTR-MCNC: 241 MG/DL (ref 70–130)
GLUCOSE BLDC GLUCOMTR-MCNC: 288 MG/DL (ref 70–130)
GLUCOSE BLDC GLUCOMTR-MCNC: 320 MG/DL (ref 70–130)
GLUCOSE BLDC GLUCOMTR-MCNC: 46 MG/DL (ref 70–130)
GLUCOSE BLDC GLUCOMTR-MCNC: 47 MG/DL (ref 70–130)
GLUCOSE BLDC GLUCOMTR-MCNC: 69 MG/DL (ref 70–130)
GLUCOSE BLDC GLUCOMTR-MCNC: 74 MG/DL (ref 70–130)
GLUCOSE BLDC GLUCOMTR-MCNC: 75 MG/DL (ref 70–130)
GLUCOSE BLDC GLUCOMTR-MCNC: 76 MG/DL (ref 70–130)
GLUCOSE BLDC GLUCOMTR-MCNC: 76 MG/DL (ref 70–130)
GLUCOSE BLDC GLUCOMTR-MCNC: 80 MG/DL (ref 70–130)
GLUCOSE BLDC GLUCOMTR-MCNC: 83 MG/DL (ref 70–130)
GLUCOSE BLDC GLUCOMTR-MCNC: 85 MG/DL (ref 70–130)
GLUCOSE BLDC GLUCOMTR-MCNC: 85 MG/DL (ref 70–130)
GLUCOSE BLDC GLUCOMTR-MCNC: 92 MG/DL (ref 70–130)
GLUCOSE BLDC GLUCOMTR-MCNC: 94 MG/DL (ref 70–130)
GLUCOSE BLDC GLUCOMTR-MCNC: 96 MG/DL (ref 70–130)
GLUCOSE BLDC GLUCOMTR-MCNC: 96 MG/DL (ref 70–130)
GLUCOSE SERPL-MCNC: 100 MG/DL (ref 65–99)
GLUCOSE SERPL-MCNC: 104 MG/DL (ref 65–99)
GLUCOSE SERPL-MCNC: 108 MG/DL (ref 65–99)
GLUCOSE SERPL-MCNC: 113 MG/DL (ref 65–99)
GLUCOSE SERPL-MCNC: 119 MG/DL (ref 65–99)
GLUCOSE SERPL-MCNC: 122 MG/DL (ref 65–99)
GLUCOSE SERPL-MCNC: 122 MG/DL (ref 65–99)
GLUCOSE SERPL-MCNC: 123 MG/DL (ref 65–99)
GLUCOSE SERPL-MCNC: 124 MG/DL (ref 65–99)
GLUCOSE SERPL-MCNC: 126 MG/DL (ref 65–99)
GLUCOSE SERPL-MCNC: 127 MG/DL (ref 65–99)
GLUCOSE SERPL-MCNC: 136 MG/DL (ref 65–99)
GLUCOSE SERPL-MCNC: 139 MG/DL (ref 65–99)
GLUCOSE SERPL-MCNC: 141 MG/DL (ref 65–99)
GLUCOSE SERPL-MCNC: 143 MG/DL (ref 65–99)
GLUCOSE SERPL-MCNC: 144 MG/DL (ref 65–99)
GLUCOSE SERPL-MCNC: 145 MG/DL (ref 65–99)
GLUCOSE SERPL-MCNC: 151 MG/DL (ref 65–99)
GLUCOSE SERPL-MCNC: 154 MG/DL (ref 65–99)
GLUCOSE SERPL-MCNC: 161 MG/DL (ref 65–99)
GLUCOSE SERPL-MCNC: 165 MG/DL (ref 65–99)
GLUCOSE SERPL-MCNC: 169 MG/DL (ref 65–99)
GLUCOSE SERPL-MCNC: 178 MG/DL (ref 65–99)
GLUCOSE SERPL-MCNC: 181 MG/DL (ref 65–99)
GLUCOSE SERPL-MCNC: 195 MG/DL (ref 65–99)
GLUCOSE SERPL-MCNC: 200 MG/DL (ref 65–99)
GLUCOSE SERPL-MCNC: 210 MG/DL (ref 65–99)
GLUCOSE SERPL-MCNC: 210 MG/DL (ref 65–99)
GLUCOSE SERPL-MCNC: 215 MG/DL (ref 65–99)
GLUCOSE SERPL-MCNC: 218 MG/DL (ref 65–99)
GLUCOSE SERPL-MCNC: 80 MG/DL (ref 65–99)
GLUCOSE SERPL-MCNC: 82 MG/DL (ref 65–99)
GLUCOSE SERPL-MCNC: 89 MG/DL (ref 65–99)
GLUCOSE SERPL-MCNC: 96 MG/DL (ref 65–99)
GLUCOSE SERPL-MCNC: 97 MG/DL (ref 65–99)
GLUCOSE SERPL-MCNC: 98 MG/DL (ref 65–99)
GLUCOSE UR STRIP-MCNC: ABNORMAL MG/DL
GLUCOSE UR STRIP-MCNC: ABNORMAL MG/DL
GLUCOSE UR STRIP-MCNC: NEGATIVE MG/DL
GRAM STN SPEC: ABNORMAL
HBA1C MFR BLD: 5.7 % (ref 4.8–5.6)
HBA1C MFR BLD: 5.8 % (ref 4.8–5.6)
HBA1C MFR BLD: 5.8 % (ref 4.8–5.6)
HBV CORE IGM SERPL QL IA: NORMAL
HBV SURFACE AB SER RIA-ACNC: REACTIVE
HBV SURFACE AG SERPL QL IA: NORMAL
HBV SURFACE AG SERPL QL IA: NORMAL
HCO3 BLDA-SCNC: 22.8 MMOL/L (ref 20–26)
HCO3 BLDA-SCNC: 25.2 MMOL/L (ref 20–26)
HCO3 BLDA-SCNC: 25.5 MMOL/L (ref 20–26)
HCO3 BLDA-SCNC: 26.1 MMOL/L (ref 20–26)
HCO3 BLDA-SCNC: 26.1 MMOL/L (ref 20–26)
HCO3 BLDA-SCNC: 28.1 MMOL/L (ref 20–26)
HCO3 BLDA-SCNC: 28.3 MMOL/L (ref 20–26)
HCO3 BLDA-SCNC: 28.8 MMOL/L (ref 20–26)
HCT VFR BLD AUTO: 27.1 % (ref 34–46.6)
HCT VFR BLD AUTO: 27.3 % (ref 34–46.6)
HCT VFR BLD AUTO: 28.3 % (ref 34–46.6)
HCT VFR BLD AUTO: 28.4 % (ref 34–46.6)
HCT VFR BLD AUTO: 29 % (ref 34–46.6)
HCT VFR BLD AUTO: 29.4 % (ref 34–46.6)
HCT VFR BLD AUTO: 30.1 % (ref 34–46.6)
HCT VFR BLD AUTO: 30.3 % (ref 34–46.6)
HCT VFR BLD AUTO: 30.4 % (ref 34–46.6)
HCT VFR BLD AUTO: 30.6 % (ref 34–46.6)
HCT VFR BLD AUTO: 30.7 % (ref 34–46.6)
HCT VFR BLD AUTO: 31.1 % (ref 34–46.6)
HCT VFR BLD AUTO: 31.2 % (ref 34–46.6)
HCT VFR BLD AUTO: 31.3 % (ref 34–46.6)
HCT VFR BLD AUTO: 31.3 % (ref 34–46.6)
HCT VFR BLD AUTO: 31.5 % (ref 34–46.6)
HCT VFR BLD AUTO: 31.8 % (ref 34–46.6)
HCT VFR BLD AUTO: 32.3 % (ref 34–46.6)
HCT VFR BLD AUTO: 32.3 % (ref 34–46.6)
HCT VFR BLD AUTO: 32.5 % (ref 34–46.6)
HCT VFR BLD AUTO: 32.8 % (ref 34–46.6)
HCT VFR BLD AUTO: 33 % (ref 34–46.6)
HCT VFR BLD AUTO: 33.7 % (ref 34–46.6)
HCT VFR BLD AUTO: 33.7 % (ref 34–46.6)
HCT VFR BLD AUTO: 33.9 % (ref 34–46.6)
HCT VFR BLD AUTO: 34.7 % (ref 34–46.6)
HCT VFR BLD AUTO: 35.3 % (ref 34–46.6)
HCT VFR BLD AUTO: 36 % (ref 34–46.6)
HCT VFR BLD AUTO: 36.7 % (ref 34–46.6)
HCT VFR BLD AUTO: 36.9 % (ref 34–46.6)
HCT VFR BLD AUTO: 36.9 % (ref 34–46.6)
HCT VFR BLD AUTO: 37.7 % (ref 34–46.6)
HCT VFR BLD AUTO: 44.7 % (ref 34–46.6)
HCT VFR BLD AUTO: NORMAL %
HGB BLD-MCNC: 10 G/DL (ref 12–15.9)
HGB BLD-MCNC: 10.1 G/DL (ref 12–15.9)
HGB BLD-MCNC: 10.1 G/DL (ref 12–15.9)
HGB BLD-MCNC: 10.2 G/DL (ref 12–15.9)
HGB BLD-MCNC: 10.2 G/DL (ref 12–15.9)
HGB BLD-MCNC: 10.3 G/DL (ref 12–15.9)
HGB BLD-MCNC: 10.4 G/DL (ref 12–15.9)
HGB BLD-MCNC: 10.8 G/DL (ref 12–15.9)
HGB BLD-MCNC: 10.8 G/DL (ref 12–15.9)
HGB BLD-MCNC: 10.9 G/DL (ref 12–15.9)
HGB BLD-MCNC: 11 G/DL (ref 12–15.9)
HGB BLD-MCNC: 11.8 G/DL (ref 12–15.9)
HGB BLD-MCNC: 12.3 G/DL (ref 12–15.9)
HGB BLD-MCNC: 12.5 G/DL (ref 12–15.9)
HGB BLD-MCNC: 13.4 G/DL (ref 12–15.9)
HGB BLD-MCNC: 8.5 G/DL (ref 12–15.9)
HGB BLD-MCNC: 8.5 G/DL (ref 12–15.9)
HGB BLD-MCNC: 8.9 G/DL (ref 12–15.9)
HGB BLD-MCNC: 9.1 G/DL (ref 12–15.9)
HGB BLD-MCNC: 9.1 G/DL (ref 12–15.9)
HGB BLD-MCNC: 9.2 G/DL (ref 12–15.9)
HGB BLD-MCNC: 9.2 G/DL (ref 12–15.9)
HGB BLD-MCNC: 9.5 G/DL (ref 12–15.9)
HGB BLD-MCNC: 9.6 G/DL (ref 12–15.9)
HGB BLD-MCNC: 9.7 G/DL (ref 12–15.9)
HGB BLD-MCNC: 9.7 G/DL (ref 12–15.9)
HGB BLD-MCNC: 9.9 G/DL (ref 12–15.9)
HGB BLD-MCNC: NORMAL G/DL
HGB UR QL STRIP.AUTO: ABNORMAL
HOLD SPECIMEN: NORMAL
HYALINE CASTS UR QL AUTO: ABNORMAL /LPF
HYPOCHROMIA BLD QL: ABNORMAL
HYPOCHROMIA BLD QL: ABNORMAL
IMM GRANULOCYTES # BLD AUTO: 0.01 10*3/MM3 (ref 0–0.05)
IMM GRANULOCYTES # BLD AUTO: 0.02 10*3/MM3 (ref 0–0.05)
IMM GRANULOCYTES # BLD AUTO: 0.03 10*3/MM3 (ref 0–0.05)
IMM GRANULOCYTES # BLD AUTO: 0.03 10*3/MM3 (ref 0–0.05)
IMM GRANULOCYTES # BLD AUTO: 0.09 10*3/MM3 (ref 0–0.05)
IMM GRANULOCYTES # BLD AUTO: 0.1 10*3/MM3 (ref 0–0.05)
IMM GRANULOCYTES # BLD AUTO: 0.12 10*3/MM3 (ref 0–0.05)
IMM GRANULOCYTES # BLD AUTO: 0.13 10*3/MM3 (ref 0–0.05)
IMM GRANULOCYTES NFR BLD AUTO: 0.2 % (ref 0–0.5)
IMM GRANULOCYTES NFR BLD AUTO: 0.3 % (ref 0–0.5)
IMM GRANULOCYTES NFR BLD AUTO: 0.4 % (ref 0–0.5)
IMM GRANULOCYTES NFR BLD AUTO: 0.5 % (ref 0–0.5)
IMM GRANULOCYTES NFR BLD AUTO: 0.5 % (ref 0–0.5)
IMM GRANULOCYTES NFR BLD AUTO: 0.6 % (ref 0–0.5)
IMM GRANULOCYTES NFR BLD AUTO: 0.7 % (ref 0–0.5)
IMM GRANULOCYTES NFR BLD AUTO: 0.8 % (ref 0–0.5)
IMM GRANULOCYTES NFR BLD AUTO: 0.8 % (ref 0–0.5)
IMM GRANULOCYTES NFR BLD AUTO: 1.1 % (ref 0–0.5)
IMM GRANULOCYTES NFR BLD AUTO: 2 % (ref 0–0.5)
INHALED O2 CONCENTRATION: 100 %
INHALED O2 CONCENTRATION: 21 %
INHALED O2 CONCENTRATION: 30 %
INHALED O2 CONCENTRATION: 35 %
INHALED O2 CONCENTRATION: 50 %
INR PPP: 1.16 (ref 0.91–1.09)
INR PPP: 1.18 (ref 0.91–1.09)
INR PPP: 1.19 (ref 0.91–1.09)
INR PPP: 1.22 (ref 0.91–1.09)
INR PPP: 1.32 (ref 0.91–1.09)
INR PPP: 1.4 (ref 0.91–1.09)
INR PPP: 1.72 (ref 0.91–1.09)
INR PPP: NORMAL
IPAP: 16
IRON 24H UR-MRATE: 41 MCG/DL (ref 37–145)
IRON SATN MFR SERPL: 13 % (ref 20–50)
ISOLATED FROM: ABNORMAL
KETONES UR QL STRIP: ABNORMAL
KETONES UR QL STRIP: NEGATIVE
KETONES UR QL STRIP: NEGATIVE
LEFT ATRIUM VOLUME: 34.5 CM3
LEUKOCYTE ESTERASE UR QL STRIP.AUTO: ABNORMAL
LYMPHOCYTES # BLD AUTO: 0.27 10*3/MM3 (ref 0.7–3.1)
LYMPHOCYTES # BLD AUTO: 0.32 10*3/MM3 (ref 0.7–3.1)
LYMPHOCYTES # BLD AUTO: 0.33 10*3/MM3 (ref 0.7–3.1)
LYMPHOCYTES # BLD AUTO: 0.44 10*3/MM3 (ref 0.7–3.1)
LYMPHOCYTES # BLD AUTO: 0.47 10*3/MM3 (ref 0.7–3.1)
LYMPHOCYTES # BLD AUTO: 0.48 10*3/MM3 (ref 0.7–3.1)
LYMPHOCYTES # BLD AUTO: 0.52 10*3/MM3 (ref 0.7–3.1)
LYMPHOCYTES # BLD AUTO: 0.55 10*3/MM3 (ref 0.7–3.1)
LYMPHOCYTES # BLD AUTO: 0.68 10*3/MM3 (ref 0.7–3.1)
LYMPHOCYTES # BLD AUTO: 0.69 10*3/MM3 (ref 0.7–3.1)
LYMPHOCYTES # BLD AUTO: 0.73 10*3/MM3 (ref 0.7–3.1)
LYMPHOCYTES # BLD AUTO: 0.76 10*3/MM3 (ref 0.7–3.1)
LYMPHOCYTES # BLD AUTO: 0.77 10*3/MM3 (ref 0.7–3.1)
LYMPHOCYTES # BLD AUTO: 0.78 10*3/MM3 (ref 0.7–3.1)
LYMPHOCYTES # BLD AUTO: 0.81 10*3/MM3 (ref 0.7–3.1)
LYMPHOCYTES # BLD AUTO: 0.86 10*3/MM3 (ref 0.7–3.1)
LYMPHOCYTES # BLD AUTO: 1 10*3/MM3 (ref 0.7–3.1)
LYMPHOCYTES # BLD AUTO: 1.01 10*3/MM3 (ref 0.7–3.1)
LYMPHOCYTES # BLD MANUAL: 0.26 10*3/MM3 (ref 0.7–3.1)
LYMPHOCYTES # BLD MANUAL: 0.27 10*3/MM3 (ref 0.7–3.1)
LYMPHOCYTES # BLD MANUAL: 0.32 10*3/MM3 (ref 0.7–3.1)
LYMPHOCYTES # BLD MANUAL: 0.37 10*3/MM3 (ref 0.7–3.1)
LYMPHOCYTES # BLD MANUAL: NORMAL 10*3/UL
LYMPHOCYTES NFR BLD AUTO: 10.9 % (ref 19.6–45.3)
LYMPHOCYTES NFR BLD AUTO: 11 % (ref 19.6–45.3)
LYMPHOCYTES NFR BLD AUTO: 13.1 % (ref 19.6–45.3)
LYMPHOCYTES NFR BLD AUTO: 14.7 % (ref 19.6–45.3)
LYMPHOCYTES NFR BLD AUTO: 15.1 % (ref 19.6–45.3)
LYMPHOCYTES NFR BLD AUTO: 15.3 % (ref 19.6–45.3)
LYMPHOCYTES NFR BLD AUTO: 15.6 % (ref 19.6–45.3)
LYMPHOCYTES NFR BLD AUTO: 17.1 % (ref 19.6–45.3)
LYMPHOCYTES NFR BLD AUTO: 17.8 % (ref 19.6–45.3)
LYMPHOCYTES NFR BLD AUTO: 19.7 % (ref 19.6–45.3)
LYMPHOCYTES NFR BLD AUTO: 2.9 % (ref 19.6–45.3)
LYMPHOCYTES NFR BLD AUTO: 20.4 % (ref 19.6–45.3)
LYMPHOCYTES NFR BLD AUTO: 20.4 % (ref 19.6–45.3)
LYMPHOCYTES NFR BLD AUTO: 22.1 % (ref 19.6–45.3)
LYMPHOCYTES NFR BLD AUTO: 3.7 % (ref 19.6–45.3)
LYMPHOCYTES NFR BLD AUTO: 4.2 % (ref 19.6–45.3)
LYMPHOCYTES NFR BLD AUTO: 6.5 % (ref 19.6–45.3)
LYMPHOCYTES NFR BLD AUTO: 9.6 % (ref 19.6–45.3)
LYMPHOCYTES NFR BLD MANUAL: 3 % (ref 19.6–45.3)
LYMPHOCYTES NFR BLD MANUAL: 3 % (ref 5–12)
LYMPHOCYTES NFR BLD MANUAL: 3 % (ref 5–12)
LYMPHOCYTES NFR BLD MANUAL: 4 % (ref 19.6–45.3)
LYMPHOCYTES NFR BLD MANUAL: 4 % (ref 19.6–45.3)
LYMPHOCYTES NFR BLD MANUAL: 5 % (ref 19.6–45.3)
LYMPHOCYTES NFR BLD MANUAL: 5 % (ref 5–12)
LYMPHOCYTES NFR BLD MANUAL: 6 % (ref 5–12)
Lab: 0
Lab: 195
Lab: ABNORMAL
MACROCYTES BLD QL SMEAR: ABNORMAL
MACROCYTES BLD QL SMEAR: ABNORMAL
MAGNESIUM SERPL-MCNC: 1.8 MG/DL (ref 1.6–2.4)
MAGNESIUM SERPL-MCNC: 1.9 MG/DL (ref 1.6–2.4)
MAGNESIUM SERPL-MCNC: 2.4 MG/DL (ref 1.6–2.4)
MAGNESIUM SERPL-MCNC: 2.5 MG/DL (ref 1.6–2.4)
MAGNESIUM SERPL-MCNC: 2.5 MG/DL (ref 1.6–2.4)
MAGNESIUM SERPL-MCNC: NORMAL MG/DL
MCH RBC QN AUTO: 29.6 PG (ref 26.6–33)
MCH RBC QN AUTO: 29.9 PG (ref 26.6–33)
MCH RBC QN AUTO: 29.9 PG (ref 26.6–33)
MCH RBC QN AUTO: 30.1 PG (ref 26.6–33)
MCH RBC QN AUTO: 30.1 PG (ref 26.6–33)
MCH RBC QN AUTO: 30.3 PG (ref 26.6–33)
MCH RBC QN AUTO: 30.3 PG (ref 26.6–33)
MCH RBC QN AUTO: 30.4 PG (ref 26.6–33)
MCH RBC QN AUTO: 30.4 PG (ref 26.6–33)
MCH RBC QN AUTO: 30.5 PG (ref 26.6–33)
MCH RBC QN AUTO: 30.6 PG (ref 26.6–33)
MCH RBC QN AUTO: 30.7 PG (ref 26.6–33)
MCH RBC QN AUTO: 30.9 PG (ref 26.6–33)
MCH RBC QN AUTO: 31.1 PG (ref 26.6–33)
MCH RBC QN AUTO: 31.4 PG (ref 26.6–33)
MCH RBC QN AUTO: 31.4 PG (ref 26.6–33)
MCH RBC QN AUTO: 31.7 PG (ref 26.6–33)
MCH RBC QN AUTO: 32 PG (ref 26.6–33)
MCH RBC QN AUTO: NORMAL PG
MCHC RBC AUTO-ENTMCNC: 29.7 G/DL (ref 31.5–35.7)
MCHC RBC AUTO-ENTMCNC: 29.8 G/DL (ref 31.5–35.7)
MCHC RBC AUTO-ENTMCNC: 30 G/DL (ref 31.5–35.7)
MCHC RBC AUTO-ENTMCNC: 30.3 G/DL (ref 31.5–35.7)
MCHC RBC AUTO-ENTMCNC: 30.6 G/DL (ref 31.5–35.7)
MCHC RBC AUTO-ENTMCNC: 30.6 G/DL (ref 31.5–35.7)
MCHC RBC AUTO-ENTMCNC: 30.7 G/DL (ref 31.5–35.7)
MCHC RBC AUTO-ENTMCNC: 31.1 G/DL (ref 31.5–35.7)
MCHC RBC AUTO-ENTMCNC: 31.1 G/DL (ref 31.5–35.7)
MCHC RBC AUTO-ENTMCNC: 31.3 G/DL (ref 31.5–35.7)
MCHC RBC AUTO-ENTMCNC: 31.4 G/DL (ref 31.5–35.7)
MCHC RBC AUTO-ENTMCNC: 31.5 G/DL (ref 31.5–35.7)
MCHC RBC AUTO-ENTMCNC: 31.6 G/DL (ref 31.5–35.7)
MCHC RBC AUTO-ENTMCNC: 31.6 G/DL (ref 31.5–35.7)
MCHC RBC AUTO-ENTMCNC: 31.7 G/DL (ref 31.5–35.7)
MCHC RBC AUTO-ENTMCNC: 31.8 G/DL (ref 31.5–35.7)
MCHC RBC AUTO-ENTMCNC: 31.9 G/DL (ref 31.5–35.7)
MCHC RBC AUTO-ENTMCNC: 31.9 G/DL (ref 31.5–35.7)
MCHC RBC AUTO-ENTMCNC: 32 G/DL (ref 31.5–35.7)
MCHC RBC AUTO-ENTMCNC: 32 G/DL (ref 31.5–35.7)
MCHC RBC AUTO-ENTMCNC: 32.2 G/DL (ref 31.5–35.7)
MCHC RBC AUTO-ENTMCNC: 32.6 G/DL (ref 31.5–35.7)
MCHC RBC AUTO-ENTMCNC: 32.7 G/DL (ref 31.5–35.7)
MCHC RBC AUTO-ENTMCNC: 33.9 G/DL (ref 31.5–35.7)
MCHC RBC AUTO-ENTMCNC: NORMAL G/DL
MCV RBC AUTO: 100 FL (ref 79–97)
MCV RBC AUTO: 100.3 FL (ref 79–97)
MCV RBC AUTO: 100.7 FL (ref 79–97)
MCV RBC AUTO: 100.8 FL (ref 79–97)
MCV RBC AUTO: 101 FL (ref 79–97)
MCV RBC AUTO: 101.4 FL (ref 79–97)
MCV RBC AUTO: 101.5 FL (ref 79–97)
MCV RBC AUTO: 101.8 FL (ref 79–97)
MCV RBC AUTO: 91.1 FL (ref 79–97)
MCV RBC AUTO: 93.1 FL (ref 79–97)
MCV RBC AUTO: 94.2 FL (ref 79–97)
MCV RBC AUTO: 95.4 FL (ref 79–97)
MCV RBC AUTO: 95.6 FL (ref 79–97)
MCV RBC AUTO: 95.9 FL (ref 79–97)
MCV RBC AUTO: 96.4 FL (ref 79–97)
MCV RBC AUTO: 97.2 FL (ref 79–97)
MCV RBC AUTO: 97.2 FL (ref 79–97)
MCV RBC AUTO: 97.3 FL (ref 79–97)
MCV RBC AUTO: 97.6 FL (ref 79–97)
MCV RBC AUTO: 97.8 FL (ref 79–97)
MCV RBC AUTO: 97.9 FL (ref 79–97)
MCV RBC AUTO: 98.6 FL (ref 79–97)
MCV RBC AUTO: 98.8 FL (ref 79–97)
MCV RBC AUTO: 98.9 FL (ref 79–97)
MCV RBC AUTO: 99.4 FL (ref 79–97)
MCV RBC AUTO: 99.7 FL (ref 79–97)
MCV RBC AUTO: 99.7 FL (ref 79–97)
MCV RBC AUTO: NORMAL FL
METAMYELOCYTES NFR BLD MANUAL: 5 % (ref 0–0)
METHADONE UR QL SCN: NEGATIVE
METHADONE UR QL SCN: NEGATIVE
MODALITY: ABNORMAL
MONOCYTES # BLD AUTO: 0.09 10*3/MM3 (ref 0.1–0.9)
MONOCYTES # BLD AUTO: 0.2 10*3/MM3 (ref 0.1–0.9)
MONOCYTES # BLD AUTO: 0.24 10*3/MM3 (ref 0.1–0.9)
MONOCYTES # BLD AUTO: 0.26 10*3/MM3 (ref 0.1–0.9)
MONOCYTES # BLD AUTO: 0.31 10*3/MM3 (ref 0.1–0.9)
MONOCYTES # BLD AUTO: 0.33 10*3/MM3 (ref 0.1–0.9)
MONOCYTES # BLD AUTO: 0.36 10*3/MM3 (ref 0.1–0.9)
MONOCYTES # BLD AUTO: 0.37 10*3/MM3 (ref 0.1–0.9)
MONOCYTES # BLD AUTO: 0.39 10*3/MM3 (ref 0.1–0.9)
MONOCYTES # BLD AUTO: 0.4 10*3/MM3 (ref 0.1–0.9)
MONOCYTES # BLD AUTO: 0.42 10*3/MM3 (ref 0.1–0.9)
MONOCYTES # BLD AUTO: 0.42 10*3/MM3 (ref 0.1–0.9)
MONOCYTES # BLD AUTO: 0.43 10*3/MM3 (ref 0.1–0.9)
MONOCYTES # BLD AUTO: 0.45 10*3/MM3 (ref 0.1–0.9)
MONOCYTES # BLD AUTO: 0.46 10*3/MM3 (ref 0.1–0.9)
MONOCYTES # BLD AUTO: 0.49 10*3/MM3 (ref 0.1–0.9)
MONOCYTES # BLD AUTO: 0.53 10*3/MM3 (ref 0.1–0.9)
MONOCYTES # BLD AUTO: 0.53 10*3/MM3 (ref 0.1–0.9)
MONOCYTES # BLD AUTO: 0.56 10*3/MM3 (ref 0.1–0.9)
MONOCYTES # BLD AUTO: 0.66 10*3/MM3 (ref 0.1–0.9)
MONOCYTES # BLD AUTO: 0.66 10*3/MM3 (ref 0.1–0.9)
MONOCYTES # BLD AUTO: 0.71 10*3/MM3 (ref 0.1–0.9)
MONOCYTES # BLD AUTO: NORMAL 10*3/UL
MONOCYTES NFR BLD AUTO: 1.4 % (ref 5–12)
MONOCYTES NFR BLD AUTO: 10.8 % (ref 5–12)
MONOCYTES NFR BLD AUTO: 12.3 % (ref 5–12)
MONOCYTES NFR BLD AUTO: 12.3 % (ref 5–12)
MONOCYTES NFR BLD AUTO: 12.8 % (ref 5–12)
MONOCYTES NFR BLD AUTO: 13.1 % (ref 5–12)
MONOCYTES NFR BLD AUTO: 14.1 % (ref 5–12)
MONOCYTES NFR BLD AUTO: 15.4 % (ref 5–12)
MONOCYTES NFR BLD AUTO: 17.3 % (ref 5–12)
MONOCYTES NFR BLD AUTO: 18.7 % (ref 5–12)
MONOCYTES NFR BLD AUTO: 2.6 % (ref 5–12)
MONOCYTES NFR BLD AUTO: 3.6 % (ref 5–12)
MONOCYTES NFR BLD AUTO: 5 % (ref 5–12)
MONOCYTES NFR BLD AUTO: 5.3 % (ref 5–12)
MONOCYTES NFR BLD AUTO: 6 % (ref 5–12)
MONOCYTES NFR BLD AUTO: 6.2 % (ref 5–12)
MONOCYTES NFR BLD AUTO: 7.1 % (ref 5–12)
MONOCYTES NFR BLD AUTO: 8 % (ref 5–12)
MYELOCYTES NFR BLD MANUAL: 2 % (ref 0–0)
NEGATIVE CONTROL: NEGATIVE
NEUTROPHILS # BLD AUTO: 6.18 10*3/MM3 (ref 1.7–7)
NEUTROPHILS # BLD AUTO: 6.57 10*3/MM3 (ref 1.7–7)
NEUTROPHILS # BLD AUTO: 7.22 10*3/MM3 (ref 1.7–7)
NEUTROPHILS # BLD AUTO: 7.41 10*3/MM3 (ref 1.7–7)
NEUTROPHILS # BLD AUTO: NORMAL 10*3/UL
NEUTROPHILS NFR BLD AUTO: 1.68 10*3/MM3 (ref 1.7–7)
NEUTROPHILS NFR BLD AUTO: 1.88 10*3/MM3 (ref 1.7–7)
NEUTROPHILS NFR BLD AUTO: 1.99 10*3/MM3 (ref 1.7–7)
NEUTROPHILS NFR BLD AUTO: 11.57 10*3/MM3 (ref 1.7–7)
NEUTROPHILS NFR BLD AUTO: 15.17 10*3/MM3 (ref 1.7–7)
NEUTROPHILS NFR BLD AUTO: 2.17 10*3/MM3 (ref 1.7–7)
NEUTROPHILS NFR BLD AUTO: 2.21 10*3/MM3 (ref 1.7–7)
NEUTROPHILS NFR BLD AUTO: 2.25 10*3/MM3 (ref 1.7–7)
NEUTROPHILS NFR BLD AUTO: 2.48 10*3/MM3 (ref 1.7–7)
NEUTROPHILS NFR BLD AUTO: 2.62 10*3/MM3 (ref 1.7–7)
NEUTROPHILS NFR BLD AUTO: 3.14 10*3/MM3 (ref 1.7–7)
NEUTROPHILS NFR BLD AUTO: 3.16 10*3/MM3 (ref 1.7–7)
NEUTROPHILS NFR BLD AUTO: 3.46 10*3/MM3 (ref 1.7–7)
NEUTROPHILS NFR BLD AUTO: 4.76 10*3/MM3 (ref 1.7–7)
NEUTROPHILS NFR BLD AUTO: 4.81 10*3/MM3 (ref 1.7–7)
NEUTROPHILS NFR BLD AUTO: 4.82 10*3/MM3 (ref 1.7–7)
NEUTROPHILS NFR BLD AUTO: 53.2 % (ref 42.7–76)
NEUTROPHILS NFR BLD AUTO: 58.6 % (ref 42.7–76)
NEUTROPHILS NFR BLD AUTO: 6.05 10*3/MM3 (ref 1.7–7)
NEUTROPHILS NFR BLD AUTO: 60.2 % (ref 42.7–76)
NEUTROPHILS NFR BLD AUTO: 60.4 % (ref 42.7–76)
NEUTROPHILS NFR BLD AUTO: 60.5 % (ref 42.7–76)
NEUTROPHILS NFR BLD AUTO: 64.8 % (ref 42.7–76)
NEUTROPHILS NFR BLD AUTO: 65.4 % (ref 42.7–76)
NEUTROPHILS NFR BLD AUTO: 70.3 % (ref 42.7–76)
NEUTROPHILS NFR BLD AUTO: 72 % (ref 42.7–76)
NEUTROPHILS NFR BLD AUTO: 72.6 % (ref 42.7–76)
NEUTROPHILS NFR BLD AUTO: 73.8 % (ref 42.7–76)
NEUTROPHILS NFR BLD AUTO: 74.1 % (ref 42.7–76)
NEUTROPHILS NFR BLD AUTO: 76.4 % (ref 42.7–76)
NEUTROPHILS NFR BLD AUTO: 79.1 % (ref 42.7–76)
NEUTROPHILS NFR BLD AUTO: 86.5 % (ref 42.7–76)
NEUTROPHILS NFR BLD AUTO: 9.23 10*3/MM3 (ref 1.7–7)
NEUTROPHILS NFR BLD AUTO: 91.4 % (ref 42.7–76)
NEUTROPHILS NFR BLD AUTO: 93.3 % (ref 42.7–76)
NEUTROPHILS NFR BLD AUTO: 93.9 % (ref 42.7–76)
NEUTROPHILS NFR BLD MANUAL: 75 % (ref 42.7–76)
NEUTROPHILS NFR BLD MANUAL: 86 % (ref 42.7–76)
NEUTROPHILS NFR BLD MANUAL: 87 % (ref 42.7–76)
NEUTROPHILS NFR BLD MANUAL: 90 % (ref 42.7–76)
NEUTS BAND NFR BLD MANUAL: 12 % (ref 0–5)
NEUTS BAND NFR BLD MANUAL: 2 % (ref 0–5)
NEUTS BAND NFR BLD MANUAL: 6 % (ref 0–5)
NITRITE UR QL STRIP: NEGATIVE
NOROVIRUS GI+II RNA STL QL NAA+NON-PROBE: NOT DETECTED
NOROVIRUS GI+II RNA STL QL NAA+NON-PROBE: NOT DETECTED
NOTIFIED BY: ABNORMAL
NOTIFIED WHO: ABNORMAL
NRBC BLD AUTO-RTO: 0 /100 WBC (ref 0–0.2)
NRBC BLD AUTO-RTO: 0.1 /100 WBC (ref 0–0.2)
NRBC BLD AUTO-RTO: 0.4 /100 WBC (ref 0–0.2)
NRBC BLD AUTO-RTO: 0.6 /100 WBC (ref 0–0.2)
NRBC BLD AUTO-RTO: 1.1 /100 WBC (ref 0–0.2)
NRBC BLD AUTO-RTO: 1.2 /100 WBC (ref 0–0.2)
NRBC BLD AUTO-RTO: 1.4 /100 WBC (ref 0–0.2)
NRBC BLD AUTO-RTO: 1.7 /100 WBC (ref 0–0.2)
NRBC BLD AUTO-RTO: 2.9 /100 WBC (ref 0–0.2)
NRBC SPEC MANUAL: 1 /100 WBC (ref 0–0.2)
NRBC SPEC MANUAL: 2 /100 WBC (ref 0–0.2)
NRBC SPEC MANUAL: 2 /100 WBC (ref 0–0.2)
NRBC SPEC MANUAL: NORMAL
NT-PROBNP SERPL-MCNC: ABNORMAL PG/ML (ref 0–900)
OPIATES UR QL: NEGATIVE
OPIATES UR QL: NEGATIVE
OXYCODONE UR QL SCN: POSITIVE
OXYCODONE UR QL SCN: POSITIVE
P SHIGELLOIDES DNA STL QL NAA+NON-PROBE: NOT DETECTED
P SHIGELLOIDES DNA STL QL NAA+NON-PROBE: NOT DETECTED
PCO2 BLDA: 32.8 MM HG (ref 35–45)
PCO2 BLDA: 35.8 MM HG (ref 35–45)
PCO2 BLDA: 36.1 MM HG (ref 35–45)
PCO2 BLDA: 39.3 MM HG (ref 35–45)
PCO2 BLDA: 39.4 MM HG (ref 35–45)
PCO2 BLDA: 43.4 MM HG (ref 35–45)
PCO2 BLDA: 43.5 MM HG (ref 35–45)
PCO2 BLDA: 55.6 MM HG (ref 35–45)
PCO2 TEMP ADJ BLD: 32.8 MM HG (ref 35–45)
PCO2 TEMP ADJ BLD: 35.8 MM HG (ref 35–45)
PCO2 TEMP ADJ BLD: 36.1 MM HG (ref 35–45)
PCO2 TEMP ADJ BLD: 39.3 MM HG (ref 35–45)
PCO2 TEMP ADJ BLD: 39.4 MM HG (ref 35–45)
PCO2 TEMP ADJ BLD: 43.4 MM HG (ref 35–45)
PCO2 TEMP ADJ BLD: 43.5 MM HG (ref 35–45)
PCO2 TEMP ADJ BLD: 55.6 MM HG (ref 35–45)
PCP UR QL SCN: NEGATIVE
PCP UR QL SCN: NEGATIVE
PEEP RESPIRATORY: 5 CM[H2O]
PH BLDA: 7.28 PH UNITS (ref 7.35–7.45)
PH BLDA: 7.33 PH UNITS (ref 7.35–7.45)
PH BLDA: 7.43 PH UNITS (ref 7.35–7.45)
PH BLDA: 7.43 PH UNITS (ref 7.35–7.45)
PH BLDA: 7.46 PH UNITS (ref 7.35–7.45)
PH BLDA: 7.46 PH UNITS (ref 7.35–7.45)
PH BLDA: 7.5 PH UNITS (ref 7.35–7.45)
PH BLDA: 7.51 PH UNITS (ref 7.35–7.45)
PH UR STRIP.AUTO: 5.5 [PH] (ref 5–8)
PH UR STRIP.AUTO: 6 [PH] (ref 5–8)
PH UR STRIP.AUTO: 6.5 [PH] (ref 5–8)
PH, TEMP CORRECTED: 7.28 PH UNITS (ref 7.35–7.45)
PH, TEMP CORRECTED: 7.33 PH UNITS (ref 7.35–7.45)
PH, TEMP CORRECTED: 7.43 PH UNITS (ref 7.35–7.45)
PH, TEMP CORRECTED: 7.43 PH UNITS (ref 7.35–7.45)
PH, TEMP CORRECTED: 7.46 PH UNITS (ref 7.35–7.45)
PH, TEMP CORRECTED: 7.46 PH UNITS (ref 7.35–7.45)
PH, TEMP CORRECTED: 7.5 PH UNITS (ref 7.35–7.45)
PH, TEMP CORRECTED: 7.51 PH UNITS (ref 7.35–7.45)
PHOSPHATE SERPL-MCNC: 2.5 MG/DL (ref 2.5–4.5)
PHOSPHATE SERPL-MCNC: 3.9 MG/DL (ref 2.5–4.5)
PHOSPHATE SERPL-MCNC: 4.2 MG/DL (ref 2.5–4.5)
PHOSPHATE SERPL-MCNC: 4.8 MG/DL (ref 2.5–4.5)
PHOSPHATE SERPL-MCNC: 5.2 MG/DL (ref 2.5–4.5)
PHOSPHATE SERPL-MCNC: 5.3 MG/DL (ref 2.5–4.5)
PHOSPHATE SERPL-MCNC: 8 MG/DL (ref 2.5–4.5)
PHOSPHATE SERPL-MCNC: NORMAL MG/DL
PLAT MORPH BLD: NORMAL
PLATELET # BLD AUTO: 107 10*3/MM3 (ref 140–450)
PLATELET # BLD AUTO: 118 10*3/MM3 (ref 140–450)
PLATELET # BLD AUTO: 121 10*3/MM3 (ref 140–450)
PLATELET # BLD AUTO: 126 10*3/MM3 (ref 140–450)
PLATELET # BLD AUTO: 130 10*3/MM3 (ref 140–450)
PLATELET # BLD AUTO: 145 10*3/MM3 (ref 140–450)
PLATELET # BLD AUTO: 146 10*3/MM3 (ref 140–450)
PLATELET # BLD AUTO: 151 10*3/MM3 (ref 140–450)
PLATELET # BLD AUTO: 151 10*3/MM3 (ref 140–450)
PLATELET # BLD AUTO: 155 10*3/MM3 (ref 140–450)
PLATELET # BLD AUTO: 158 10*3/MM3 (ref 140–450)
PLATELET # BLD AUTO: 162 10*3/MM3 (ref 140–450)
PLATELET # BLD AUTO: 167 10*3/MM3 (ref 140–450)
PLATELET # BLD AUTO: 173 10*3/MM3 (ref 140–450)
PLATELET # BLD AUTO: 174 10*3/MM3 (ref 140–450)
PLATELET # BLD AUTO: 175 10*3/MM3 (ref 140–450)
PLATELET # BLD AUTO: 176 10*3/MM3 (ref 140–450)
PLATELET # BLD AUTO: 177 10*3/MM3 (ref 140–450)
PLATELET # BLD AUTO: 179 10*3/MM3 (ref 140–450)
PLATELET # BLD AUTO: 186 10*3/MM3 (ref 140–450)
PLATELET # BLD AUTO: 188 10*3/MM3 (ref 140–450)
PLATELET # BLD AUTO: 195 10*3/MM3 (ref 140–450)
PLATELET # BLD AUTO: 198 10*3/MM3 (ref 140–450)
PLATELET # BLD AUTO: 199 10*3/MM3 (ref 140–450)
PLATELET # BLD AUTO: 199 10*3/MM3 (ref 140–450)
PLATELET # BLD AUTO: 218 10*3/MM3 (ref 140–450)
PLATELET # BLD AUTO: 255 10*3/MM3 (ref 140–450)
PLATELET # BLD AUTO: 273 10*3/MM3 (ref 140–450)
PLATELET # BLD AUTO: 307 10*3/MM3 (ref 140–450)
PLATELET # BLD AUTO: NORMAL 10*3/UL
PMV BLD AUTO: 10.1 FL (ref 6–12)
PMV BLD AUTO: 10.3 FL (ref 6–12)
PMV BLD AUTO: 8.8 FL (ref 6–12)
PMV BLD AUTO: 9.1 FL (ref 6–12)
PMV BLD AUTO: 9.2 FL (ref 6–12)
PMV BLD AUTO: 9.3 FL (ref 6–12)
PMV BLD AUTO: 9.4 FL (ref 6–12)
PMV BLD AUTO: 9.5 FL (ref 6–12)
PMV BLD AUTO: 9.6 FL (ref 6–12)
PMV BLD AUTO: 9.7 FL (ref 6–12)
PMV BLD AUTO: 9.7 FL (ref 6–12)
PMV BLD AUTO: 9.8 FL (ref 6–12)
PMV BLD AUTO: NORMAL FL
PO2 BLDA: 129 MM HG (ref 83–108)
PO2 BLDA: 172 MM HG (ref 83–108)
PO2 BLDA: 54 MM HG (ref 83–108)
PO2 BLDA: 63.3 MM HG (ref 83–108)
PO2 BLDA: 67.2 MM HG (ref 83–108)
PO2 BLDA: 72.7 MM HG (ref 83–108)
PO2 BLDA: 80.2 MM HG (ref 83–108)
PO2 BLDA: 82.3 MM HG (ref 83–108)
PO2 TEMP ADJ BLD: 129 MM HG (ref 83–108)
PO2 TEMP ADJ BLD: 172 MM HG (ref 83–108)
PO2 TEMP ADJ BLD: 54 MM HG (ref 83–108)
PO2 TEMP ADJ BLD: 63.3 MM HG (ref 83–108)
PO2 TEMP ADJ BLD: 67.2 MM HG (ref 83–108)
PO2 TEMP ADJ BLD: 72.7 MM HG (ref 83–108)
PO2 TEMP ADJ BLD: 80.2 MM HG (ref 83–108)
PO2 TEMP ADJ BLD: 82.3 MM HG (ref 83–108)
POLYCHROMASIA BLD QL SMEAR: ABNORMAL
POLYCHROMASIA BLD QL SMEAR: NORMAL
POSITIVE CONTROL: POSITIVE
POTASSIUM SERPL-SCNC: 3.1 MMOL/L (ref 3.5–5.2)
POTASSIUM SERPL-SCNC: 3.1 MMOL/L (ref 3.5–5.2)
POTASSIUM SERPL-SCNC: 3.2 MMOL/L (ref 3.5–5.2)
POTASSIUM SERPL-SCNC: 3.5 MMOL/L (ref 3.5–5.2)
POTASSIUM SERPL-SCNC: 3.6 MMOL/L (ref 3.5–5.2)
POTASSIUM SERPL-SCNC: 3.6 MMOL/L (ref 3.5–5.2)
POTASSIUM SERPL-SCNC: 3.7 MMOL/L (ref 3.5–5.2)
POTASSIUM SERPL-SCNC: 3.7 MMOL/L (ref 3.5–5.2)
POTASSIUM SERPL-SCNC: 3.8 MMOL/L (ref 3.5–5.2)
POTASSIUM SERPL-SCNC: 3.8 MMOL/L (ref 3.5–5.2)
POTASSIUM SERPL-SCNC: 3.9 MMOL/L (ref 3.5–5.2)
POTASSIUM SERPL-SCNC: 4 MMOL/L (ref 3.5–5.2)
POTASSIUM SERPL-SCNC: 4.1 MMOL/L (ref 3.5–5.2)
POTASSIUM SERPL-SCNC: 4.1 MMOL/L (ref 3.5–5.2)
POTASSIUM SERPL-SCNC: 4.2 MMOL/L (ref 3.5–5.2)
POTASSIUM SERPL-SCNC: 4.2 MMOL/L (ref 3.5–5.2)
POTASSIUM SERPL-SCNC: 4.3 MMOL/L (ref 3.5–5.2)
POTASSIUM SERPL-SCNC: 4.3 MMOL/L (ref 3.5–5.2)
POTASSIUM SERPL-SCNC: 4.4 MMOL/L (ref 3.5–5.2)
POTASSIUM SERPL-SCNC: 4.5 MMOL/L (ref 3.5–5.2)
POTASSIUM SERPL-SCNC: 4.6 MMOL/L (ref 3.5–5.2)
POTASSIUM SERPL-SCNC: 4.7 MMOL/L (ref 3.5–5.2)
POTASSIUM SERPL-SCNC: 4.7 MMOL/L (ref 3.5–5.2)
POTASSIUM SERPL-SCNC: 4.8 MMOL/L (ref 3.5–5.2)
POTASSIUM SERPL-SCNC: 4.8 MMOL/L (ref 3.5–5.2)
POTASSIUM SERPL-SCNC: 4.9 MMOL/L (ref 3.5–5.2)
POTASSIUM SERPL-SCNC: 4.9 MMOL/L (ref 3.5–5.2)
POTASSIUM SERPL-SCNC: 5 MMOL/L (ref 3.5–5.2)
POTASSIUM SERPL-SCNC: 5.6 MMOL/L (ref 3.5–5.2)
POTASSIUM SERPL-SCNC: 5.7 MMOL/L (ref 3.5–5.2)
POTASSIUM SERPL-SCNC: 5.8 MMOL/L (ref 3.5–5.2)
POTASSIUM SERPL-SCNC: 6.3 MMOL/L (ref 3.5–5.2)
PROCALCITONIN SERPL-MCNC: 0.23 NG/ML (ref 0–0.25)
PROCALCITONIN SERPL-MCNC: 0.34 NG/ML (ref 0–0.25)
PROCALCITONIN SERPL-MCNC: 1.36 NG/ML (ref 0–0.25)
PROCALCITONIN SERPL-MCNC: 12.46 NG/ML (ref 0–0.25)
PROCALCITONIN SERPL-MCNC: NORMAL NG/ML
PROPOXYPH UR QL: NEGATIVE
PROPOXYPH UR QL: NEGATIVE
PROT SERPL-MCNC: 5.2 G/DL (ref 6–8.5)
PROT SERPL-MCNC: 5.7 G/DL (ref 6–8.5)
PROT SERPL-MCNC: 5.9 G/DL (ref 6–8.5)
PROT SERPL-MCNC: 6.1 G/DL (ref 6–8.5)
PROT SERPL-MCNC: 6.3 G/DL (ref 6–8.5)
PROT SERPL-MCNC: 6.6 G/DL (ref 6–8.5)
PROT SERPL-MCNC: 6.6 G/DL (ref 6–8.5)
PROT SERPL-MCNC: 6.7 G/DL (ref 6–8.5)
PROT SERPL-MCNC: 6.7 G/DL (ref 6–8.5)
PROT SERPL-MCNC: 6.8 G/DL (ref 6–8.5)
PROT SERPL-MCNC: 6.9 G/DL (ref 6–8.5)
PROT SERPL-MCNC: 6.9 G/DL (ref 6–8.5)
PROT SERPL-MCNC: 7.2 G/DL (ref 6–8.5)
PROT SERPL-MCNC: 7.4 G/DL (ref 6–8.5)
PROT SERPL-MCNC: 7.8 G/DL (ref 6–8.5)
PROT UR QL STRIP: ABNORMAL
PROTHROMBIN TIME: 14.1 SECONDS (ref 11.5–13.4)
PROTHROMBIN TIME: 14.2 SECONDS (ref 11.5–13.4)
PROTHROMBIN TIME: 14.4 SECONDS (ref 11.9–14.6)
PROTHROMBIN TIME: 15 SECONDS (ref 11.9–14.6)
PROTHROMBIN TIME: 15.4 SECONDS (ref 11.5–13.4)
PROTHROMBIN TIME: 16.1 SECONDS (ref 11.5–13.4)
PROTHROMBIN TIME: 18.9 SECONDS (ref 11.5–13.4)
PROTHROMBIN TIME: NORMAL S
QT INTERVAL: 286 MS
QT INTERVAL: 358 MS
QT INTERVAL: 364 MS
QT INTERVAL: 374 MS
QT INTERVAL: 376 MS
QT INTERVAL: 390 MS
QT INTERVAL: 390 MS
QT INTERVAL: 398 MS
QTC INTERVAL: 400 MS
QTC INTERVAL: 433 MS
QTC INTERVAL: 433 MS
QTC INTERVAL: 441 MS
QTC INTERVAL: 445 MS
QTC INTERVAL: 449 MS
QTC INTERVAL: 453 MS
QTC INTERVAL: 459 MS
RBC # BLD AUTO: 2.71 10*6/MM3 (ref 3.77–5.28)
RBC # BLD AUTO: 2.9 10*6/MM3 (ref 3.77–5.28)
RBC # BLD AUTO: 2.91 10*6/MM3 (ref 3.77–5.28)
RBC # BLD AUTO: 2.97 10*6/MM3 (ref 3.77–5.28)
RBC # BLD AUTO: 2.98 10*6/MM3 (ref 3.77–5.28)
RBC # BLD AUTO: 3.01 10*6/MM3 (ref 3.77–5.28)
RBC # BLD AUTO: 3.05 10*6/MM3 (ref 3.77–5.28)
RBC # BLD AUTO: 3.14 10*6/MM3 (ref 3.77–5.28)
RBC # BLD AUTO: 3.15 10*6/MM3 (ref 3.77–5.28)
RBC # BLD AUTO: 3.16 10*6/MM3 (ref 3.77–5.28)
RBC # BLD AUTO: 3.19 10*6/MM3 (ref 3.77–5.28)
RBC # BLD AUTO: 3.2 10*6/MM3 (ref 3.77–5.28)
RBC # BLD AUTO: 3.22 10*6/MM3 (ref 3.77–5.28)
RBC # BLD AUTO: 3.24 10*6/MM3 (ref 3.77–5.28)
RBC # BLD AUTO: 3.27 10*6/MM3 (ref 3.77–5.28)
RBC # BLD AUTO: 3.31 10*6/MM3 (ref 3.77–5.28)
RBC # BLD AUTO: 3.32 10*6/MM3 (ref 3.77–5.28)
RBC # BLD AUTO: 3.34 10*6/MM3 (ref 3.77–5.28)
RBC # BLD AUTO: 3.35 10*6/MM3 (ref 3.77–5.28)
RBC # BLD AUTO: 3.35 10*6/MM3 (ref 3.77–5.28)
RBC # BLD AUTO: 3.48 10*6/MM3 (ref 3.77–5.28)
RBC # BLD AUTO: 3.57 10*6/MM3 (ref 3.77–5.28)
RBC # BLD AUTO: 3.6 10*6/MM3 (ref 3.77–5.28)
RBC # BLD AUTO: 3.65 10*6/MM3 (ref 3.77–5.28)
RBC # BLD AUTO: 3.66 10*6/MM3 (ref 3.77–5.28)
RBC # BLD AUTO: 3.94 10*6/MM3 (ref 3.77–5.28)
RBC # BLD AUTO: 3.95 10*6/MM3 (ref 3.77–5.28)
RBC # BLD AUTO: 4.05 10*6/MM3 (ref 3.77–5.28)
RBC # BLD AUTO: 4.41 10*6/MM3 (ref 3.77–5.28)
RBC # BLD AUTO: NORMAL 10*6/UL
RBC # UR: ABNORMAL /HPF
RBC MORPH BLD: NORMAL
REF LAB TEST METHOD: ABNORMAL
RETICS # AUTO: 0.15 10*6/MM3 (ref 0.02–0.13)
RETICS/RBC NFR AUTO: 4.89 % (ref 0.7–1.9)
RH BLD: POSITIVE
RVA RNA STL QL NAA+NON-PROBE: NOT DETECTED
RVA RNA STL QL NAA+NON-PROBE: NOT DETECTED
S ENT+BONG DNA STL QL NAA+NON-PROBE: NOT DETECTED
S ENT+BONG DNA STL QL NAA+NON-PROBE: NOT DETECTED
SALICYLATES SERPL-MCNC: <0.3 MG/DL
SALICYLATES SERPL-MCNC: <0.3 MG/DL
SAO2 % BLDCOA: 91.5 % (ref 94–99)
SAO2 % BLDCOA: 93.8 % (ref 94–99)
SAO2 % BLDCOA: 94.8 % (ref 94–99)
SAO2 % BLDCOA: 95.9 % (ref 94–99)
SAO2 % BLDCOA: 96.4 % (ref 94–99)
SAO2 % BLDCOA: 97.4 % (ref 94–99)
SAO2 % BLDCOA: >100.1 % (ref 94–99)
SAO2 % BLDCOA: >100.1 % (ref 94–99)
SAPO I+II+IV+V RNA STL QL NAA+NON-PROBE: NOT DETECTED
SAPO I+II+IV+V RNA STL QL NAA+NON-PROBE: NOT DETECTED
SARS-COV-2 ORF1AB RESP QL NAA+PROBE: NOT DETECTED
SARS-COV-2 RDRP RESP QL NAA+PROBE: NORMAL
SARS-COV-2 RNA PNL SPEC NAA+PROBE: NOT DETECTED
SET MECH RESP RATE: 12
SET MECH RESP RATE: 18
SET MECH RESP RATE: 26
SET MECH RESP RATE: 26
SHIGELLA SP+EIEC IPAH ST NAA+NON-PROBE: NOT DETECTED
SHIGELLA SP+EIEC IPAH ST NAA+NON-PROBE: NOT DETECTED
SMALL PLATELETS BLD QL SMEAR: ABNORMAL
SMALL PLATELETS BLD QL SMEAR: ADEQUATE
SODIUM SERPL-SCNC: 123 MMOL/L (ref 136–145)
SODIUM SERPL-SCNC: 124 MMOL/L (ref 136–145)
SODIUM SERPL-SCNC: 128 MMOL/L (ref 136–145)
SODIUM SERPL-SCNC: 128 MMOL/L (ref 136–145)
SODIUM SERPL-SCNC: 129 MMOL/L (ref 136–145)
SODIUM SERPL-SCNC: 130 MMOL/L (ref 136–145)
SODIUM SERPL-SCNC: 130 MMOL/L (ref 136–145)
SODIUM SERPL-SCNC: 131 MMOL/L (ref 136–145)
SODIUM SERPL-SCNC: 132 MMOL/L (ref 136–145)
SODIUM SERPL-SCNC: 133 MMOL/L (ref 136–145)
SODIUM SERPL-SCNC: 134 MMOL/L (ref 136–145)
SODIUM SERPL-SCNC: 134 MMOL/L (ref 136–145)
SODIUM SERPL-SCNC: 135 MMOL/L (ref 136–145)
SODIUM SERPL-SCNC: 136 MMOL/L (ref 136–145)
SODIUM SERPL-SCNC: 136 MMOL/L (ref 136–145)
SODIUM SERPL-SCNC: 137 MMOL/L (ref 136–145)
SODIUM SERPL-SCNC: 138 MMOL/L (ref 136–145)
SP GR UR STRIP: 1.01 (ref 1–1.03)
SP GR UR STRIP: 1.02 (ref 1–1.03)
SP GR UR STRIP: 1.02 (ref 1–1.03)
SQUAMOUS #/AREA URNS HPF: ABNORMAL /HPF
T&S EXPIRATION DATE: NORMAL
T4 FREE SERPL-MCNC: 0.99 NG/DL (ref 0.93–1.7)
T4 FREE SERPL-MCNC: 1.18 NG/DL (ref 0.93–1.7)
T4 FREE SERPL-MCNC: 1.22 NG/DL (ref 0.93–1.7)
TIBC SERPL-MCNC: 326 MCG/DL (ref 298–536)
TRANSFERRIN SERPL-MCNC: 219 MG/DL (ref 200–360)
TRICYCLICS UR QL SCN: NEGATIVE
TRICYCLICS UR QL SCN: NEGATIVE
TROPONIN T SERPL-MCNC: 0.08 NG/ML (ref 0–0.03)
TROPONIN T SERPL-MCNC: 0.09 NG/ML (ref 0–0.03)
TROPONIN T SERPL-MCNC: 0.09 NG/ML (ref 0–0.03)
TROPONIN T SERPL-MCNC: 0.1 NG/ML (ref 0–0.03)
TROPONIN T SERPL-MCNC: 0.1 NG/ML (ref 0–0.03)
TROPONIN T SERPL-MCNC: 0.11 NG/ML (ref 0–0.03)
TROPONIN T SERPL-MCNC: 0.13 NG/ML (ref 0–0.03)
TROPONIN T SERPL-MCNC: 0.14 NG/ML (ref 0–0.03)
TSH SERPL DL<=0.05 MIU/L-ACNC: 13.48 UIU/ML (ref 0.27–4.2)
TSH SERPL DL<=0.05 MIU/L-ACNC: 4.19 UIU/ML (ref 0.27–4.2)
TSH SERPL DL<=0.05 MIU/L-ACNC: 4.57 UIU/ML (ref 0.27–4.2)
UROBILINOGEN UR QL STRIP: ABNORMAL
V CHOL+PARA+VUL DNA STL QL NAA+NON-PROBE: NOT DETECTED
V CHOL+PARA+VUL DNA STL QL NAA+NON-PROBE: NOT DETECTED
V CHOLERAE DNA STL QL NAA+NON-PROBE: NOT DETECTED
V CHOLERAE DNA STL QL NAA+NON-PROBE: NOT DETECTED
VANCOMYCIN TROUGH SERPL-MCNC: 22.3 MCG/ML (ref 5–20)
VARIANT LYMPHS NFR BLD MANUAL: NORMAL %
VENTILATOR MODE: ABNORMAL
VENTILATOR MODE: AC
VIT B12 BLD-MCNC: 479 PG/ML (ref 211–946)
VT ON VENT VENT: 400 ML
WBC # BLD AUTO: 10.66 10*3/MM3 (ref 3.4–10.8)
WBC # BLD AUTO: 10.66 10*3/MM3 (ref 3.4–10.8)
WBC # BLD AUTO: 12.66 10*3/MM3 (ref 3.4–10.8)
WBC # BLD AUTO: 16.28 10*3/MM3 (ref 3.4–10.8)
WBC # BLD AUTO: 16.8 10*3/MM3 (ref 3.4–10.8)
WBC # BLD AUTO: 2.79 10*3/MM3 (ref 3.4–10.8)
WBC # BLD AUTO: 2.93 10*3/MM3 (ref 3.4–10.8)
WBC # BLD AUTO: 3.04 10*3/MM3 (ref 3.4–10.8)
WBC # BLD AUTO: 3.24 10*3/MM3 (ref 3.4–10.8)
WBC # BLD AUTO: 3.43 10*3/MM3 (ref 3.4–10.8)
WBC # BLD AUTO: 3.53 10*3/MM3 (ref 3.4–10.8)
WBC # BLD AUTO: 3.73 10*3/MM3 (ref 3.4–10.8)
WBC # BLD AUTO: 3.77 10*3/MM3 (ref 3.4–10.8)
WBC # BLD AUTO: 3.99 10*3/MM3 (ref 3.4–10.8)
WBC # BLD AUTO: 3.99 10*3/MM3 (ref 3.4–10.8)
WBC # BLD AUTO: 4.1 10*3/MM3 (ref 3.4–10.8)
WBC # BLD AUTO: 4.36 10*3/MM3 (ref 3.4–10.8)
WBC # BLD AUTO: 4.8 10*3/MM3 (ref 3.4–10.8)
WBC # BLD AUTO: 5.35 10*3/MM3 (ref 3.4–10.8)
WBC # BLD AUTO: 5.96 10*3/MM3 (ref 3.4–10.8)
WBC # BLD AUTO: 6.4 10*3/MM3 (ref 3.4–10.8)
WBC # BLD AUTO: 6.44 10*3/MM3 (ref 3.4–10.8)
WBC # BLD AUTO: 6.54 10*3/MM3 (ref 3.4–10.8)
WBC # BLD AUTO: 6.62 10*3/MM3 (ref 3.4–10.8)
WBC # BLD AUTO: 6.65 10*3/MM3 (ref 3.4–10.8)
WBC # BLD AUTO: 6.78 10*3/MM3 (ref 3.4–10.8)
WBC # BLD AUTO: 7.47 10*3/MM3 (ref 3.4–10.8)
WBC # BLD AUTO: 8.02 10*3/MM3 (ref 3.4–10.8)
WBC # BLD AUTO: 8.52 10*3/MM3 (ref 3.4–10.8)
WBC # BLD AUTO: NORMAL 10*3/UL
WBC MORPH BLD: NORMAL
WBC UR QL AUTO: ABNORMAL /HPF
WHOLE BLOOD HOLD SPECIMEN: NORMAL
Y ENTEROCOL DNA STL QL NAA+NON-PROBE: NOT DETECTED
Y ENTEROCOL DNA STL QL NAA+NON-PROBE: NOT DETECTED

## 2021-01-01 PROCEDURE — 80053 COMPREHEN METABOLIC PANEL: CPT | Performed by: EMERGENCY MEDICINE

## 2021-01-01 PROCEDURE — 25010000002 PIPERACILLIN SOD-TAZOBACTAM PER 1 G: Performed by: INTERNAL MEDICINE

## 2021-01-01 PROCEDURE — 25010000002 VANCOMYCIN 10 G RECONSTITUTED SOLUTION: Performed by: FAMILY MEDICINE

## 2021-01-01 PROCEDURE — 85025 COMPLETE CBC W/AUTO DIFF WBC: CPT | Performed by: INTERNAL MEDICINE

## 2021-01-01 PROCEDURE — 94799 UNLISTED PULMONARY SVC/PX: CPT

## 2021-01-01 PROCEDURE — 84443 ASSAY THYROID STIM HORMONE: CPT | Performed by: INTERNAL MEDICINE

## 2021-01-01 PROCEDURE — 74018 RADEX ABDOMEN 1 VIEW: CPT

## 2021-01-01 PROCEDURE — 82962 GLUCOSE BLOOD TEST: CPT

## 2021-01-01 PROCEDURE — 25010000002 MORPHINE SULFATE (PF) 2 MG/ML SOLUTION: Performed by: INTERNAL MEDICINE

## 2021-01-01 PROCEDURE — 92526 ORAL FUNCTION THERAPY: CPT | Performed by: SPEECH-LANGUAGE PATHOLOGIST

## 2021-01-01 PROCEDURE — 97110 THERAPEUTIC EXERCISES: CPT

## 2021-01-01 PROCEDURE — 25010000003 LIDOCAINE 1 % SOLUTION: Performed by: EMERGENCY MEDICINE

## 2021-01-01 PROCEDURE — 85027 COMPLETE CBC AUTOMATED: CPT | Performed by: NURSE PRACTITIONER

## 2021-01-01 PROCEDURE — 84100 ASSAY OF PHOSPHORUS: CPT | Performed by: INTERNAL MEDICINE

## 2021-01-01 PROCEDURE — 84439 ASSAY OF FREE THYROXINE: CPT | Performed by: INTERNAL MEDICINE

## 2021-01-01 PROCEDURE — 99285 EMERGENCY DEPT VISIT HI MDM: CPT

## 2021-01-01 PROCEDURE — 86901 BLOOD TYPING SEROLOGIC RH(D): CPT | Performed by: FAMILY MEDICINE

## 2021-01-01 PROCEDURE — 72125 CT NECK SPINE W/O DYE: CPT

## 2021-01-01 PROCEDURE — 25010000002 EPOETIN ALFA-EPBX 10000 UNIT/ML SOLUTION: Performed by: INTERNAL MEDICINE

## 2021-01-01 PROCEDURE — 25010000002 HEPARIN (PORCINE) PER 1000 UNITS: Performed by: NURSE PRACTITIONER

## 2021-01-01 PROCEDURE — 63710000001 INSULIN DETEMIR PER 5 UNITS: Performed by: INTERNAL MEDICINE

## 2021-01-01 PROCEDURE — 86850 RBC ANTIBODY SCREEN: CPT | Performed by: SURGERY

## 2021-01-01 PROCEDURE — 86900 BLOOD TYPING SEROLOGIC ABO: CPT | Performed by: INTERNAL MEDICINE

## 2021-01-01 PROCEDURE — 5A1945Z RESPIRATORY VENTILATION, 24-96 CONSECUTIVE HOURS: ICD-10-PCS | Performed by: INTERNAL MEDICINE

## 2021-01-01 PROCEDURE — 25010000002 MORPHINE PER 10 MG: Performed by: INTERNAL MEDICINE

## 2021-01-01 PROCEDURE — 63710000001 INSULIN REGULAR HUMAN PER 5 UNITS: Performed by: INTERNAL MEDICINE

## 2021-01-01 PROCEDURE — 83735 ASSAY OF MAGNESIUM: CPT | Performed by: INTERNAL MEDICINE

## 2021-01-01 PROCEDURE — 25010000002 MEROPENEM PER 100 MG: Performed by: INTERNAL MEDICINE

## 2021-01-01 PROCEDURE — 25010000002 VANCOMYCIN PER 500 MG: Performed by: NURSE PRACTITIONER

## 2021-01-01 PROCEDURE — 25010000002 MORPHINE (PF) 10 MG/ML SOLUTION: Performed by: INTERNAL MEDICINE

## 2021-01-01 PROCEDURE — 25010000002 PIPERACILLIN SOD-TAZOBACTAM PER 1 G: Performed by: FAMILY MEDICINE

## 2021-01-01 PROCEDURE — 84145 PROCALCITONIN (PCT): CPT | Performed by: NURSE PRACTITIONER

## 2021-01-01 PROCEDURE — 63710000001 INSULIN LISPRO (HUMAN) PER 5 UNITS: Performed by: INTERNAL MEDICINE

## 2021-01-01 PROCEDURE — 73552 X-RAY EXAM OF FEMUR 2/>: CPT

## 2021-01-01 PROCEDURE — 80053 COMPREHEN METABOLIC PANEL: CPT | Performed by: INTERNAL MEDICINE

## 2021-01-01 PROCEDURE — 25010000002 PROPOFOL 10 MG/ML EMULSION: Performed by: INTERNAL MEDICINE

## 2021-01-01 PROCEDURE — 85025 COMPLETE CBC W/AUTO DIFF WBC: CPT | Performed by: NURSE PRACTITIONER

## 2021-01-01 PROCEDURE — 85730 THROMBOPLASTIN TIME PARTIAL: CPT | Performed by: EMERGENCY MEDICINE

## 2021-01-01 PROCEDURE — 97116 GAIT TRAINING THERAPY: CPT

## 2021-01-01 PROCEDURE — 99284 EMERGENCY DEPT VISIT MOD MDM: CPT

## 2021-01-01 PROCEDURE — C2623 CATH, TRANSLUMIN, DRUG-COAT: HCPCS | Performed by: SURGERY

## 2021-01-01 PROCEDURE — 85018 HEMOGLOBIN: CPT | Performed by: INTERNAL MEDICINE

## 2021-01-01 PROCEDURE — 81001 URINALYSIS AUTO W/SCOPE: CPT | Performed by: EMERGENCY MEDICINE

## 2021-01-01 PROCEDURE — C1894 INTRO/SHEATH, NON-LASER: HCPCS | Performed by: SURGERY

## 2021-01-01 PROCEDURE — 25010000002 PROPOFOL 1000 MG/100ML EMULSION

## 2021-01-01 PROCEDURE — 99223 1ST HOSP IP/OBS HIGH 75: CPT | Performed by: INTERNAL MEDICINE

## 2021-01-01 PROCEDURE — 84439 ASSAY OF FREE THYROXINE: CPT | Performed by: NURSE PRACTITIONER

## 2021-01-01 PROCEDURE — 84100 ASSAY OF PHOSPHORUS: CPT | Performed by: FAMILY MEDICINE

## 2021-01-01 PROCEDURE — 80048 BASIC METABOLIC PNL TOTAL CA: CPT | Performed by: INTERNAL MEDICINE

## 2021-01-01 PROCEDURE — 83605 ASSAY OF LACTIC ACID: CPT | Performed by: EMERGENCY MEDICINE

## 2021-01-01 PROCEDURE — 25010000002 ENOXAPARIN PER 10 MG: Performed by: INTERNAL MEDICINE

## 2021-01-01 PROCEDURE — 80179 DRUG ASSAY SALICYLATE: CPT | Performed by: FAMILY MEDICINE

## 2021-01-01 PROCEDURE — 87340 HEPATITIS B SURFACE AG IA: CPT | Performed by: INTERNAL MEDICINE

## 2021-01-01 PROCEDURE — 85025 COMPLETE CBC W/AUTO DIFF WBC: CPT | Performed by: EMERGENCY MEDICINE

## 2021-01-01 PROCEDURE — 85610 PROTHROMBIN TIME: CPT | Performed by: EMERGENCY MEDICINE

## 2021-01-01 PROCEDURE — 84484 ASSAY OF TROPONIN QUANT: CPT | Performed by: INTERNAL MEDICINE

## 2021-01-01 PROCEDURE — 73060 X-RAY EXAM OF HUMERUS: CPT

## 2021-01-01 PROCEDURE — 93010 ELECTROCARDIOGRAM REPORT: CPT | Performed by: EMERGENCY MEDICINE

## 2021-01-01 PROCEDURE — 86706 HEP B SURFACE ANTIBODY: CPT | Performed by: INTERNAL MEDICINE

## 2021-01-01 PROCEDURE — 95819 EEG AWAKE AND ASLEEP: CPT | Performed by: PSYCHIATRY & NEUROLOGY

## 2021-01-01 PROCEDURE — 92950 HEART/LUNG RESUSCITATION CPR: CPT

## 2021-01-01 PROCEDURE — 80048 BASIC METABOLIC PNL TOTAL CA: CPT | Performed by: NURSE PRACTITIONER

## 2021-01-01 PROCEDURE — 87635 SARS-COV-2 COVID-19 AMP PRB: CPT | Performed by: EMERGENCY MEDICINE

## 2021-01-01 PROCEDURE — 93005 ELECTROCARDIOGRAM TRACING: CPT | Performed by: FAMILY MEDICINE

## 2021-01-01 PROCEDURE — 5A1D70Z PERFORMANCE OF URINARY FILTRATION, INTERMITTENT, LESS THAN 6 HOURS PER DAY: ICD-10-PCS | Performed by: INTERNAL MEDICINE

## 2021-01-01 PROCEDURE — 83036 HEMOGLOBIN GLYCOSYLATED A1C: CPT | Performed by: INTERNAL MEDICINE

## 2021-01-01 PROCEDURE — 87150 DNA/RNA AMPLIFIED PROBE: CPT | Performed by: NURSE PRACTITIONER

## 2021-01-01 PROCEDURE — 63710000001 INSULIN REGULAR HUMAN PER 5 UNITS: Performed by: NURSE PRACTITIONER

## 2021-01-01 PROCEDURE — 80202 ASSAY OF VANCOMYCIN: CPT | Performed by: INTERNAL MEDICINE

## 2021-01-01 PROCEDURE — 80179 DRUG ASSAY SALICYLATE: CPT | Performed by: NURSE PRACTITIONER

## 2021-01-01 PROCEDURE — 85007 BL SMEAR W/DIFF WBC COUNT: CPT | Performed by: INTERNAL MEDICINE

## 2021-01-01 PROCEDURE — 25010000003 LIDOCAINE 1 % SOLUTION: Performed by: INTERNAL MEDICINE

## 2021-01-01 PROCEDURE — 97165 OT EVAL LOW COMPLEX 30 MIN: CPT | Performed by: OCCUPATIONAL THERAPIST

## 2021-01-01 PROCEDURE — 97162 PT EVAL MOD COMPLEX 30 MIN: CPT

## 2021-01-01 PROCEDURE — 84484 ASSAY OF TROPONIN QUANT: CPT | Performed by: FAMILY MEDICINE

## 2021-01-01 PROCEDURE — 71045 X-RAY EXAM CHEST 1 VIEW: CPT

## 2021-01-01 PROCEDURE — 85025 COMPLETE CBC W/AUTO DIFF WBC: CPT | Performed by: FAMILY MEDICINE

## 2021-01-01 PROCEDURE — 99283 EMERGENCY DEPT VISIT LOW MDM: CPT

## 2021-01-01 PROCEDURE — 97165 OT EVAL LOW COMPLEX 30 MIN: CPT

## 2021-01-01 PROCEDURE — 82077 ASSAY SPEC XCP UR&BREATH IA: CPT | Performed by: EMERGENCY MEDICINE

## 2021-01-01 PROCEDURE — 93005 ELECTROCARDIOGRAM TRACING: CPT | Performed by: INTERNAL MEDICINE

## 2021-01-01 PROCEDURE — 25010000002 MORPHINE SULFATE (PF) 2 MG/ML SOLUTION: Performed by: EMERGENCY MEDICINE

## 2021-01-01 PROCEDURE — P9047 ALBUMIN (HUMAN), 25%, 50ML: HCPCS | Performed by: INTERNAL MEDICINE

## 2021-01-01 PROCEDURE — 86901 BLOOD TYPING SEROLOGIC RH(D): CPT | Performed by: INTERNAL MEDICINE

## 2021-01-01 PROCEDURE — 25010000002 VANCOMYCIN 10 G RECONSTITUTED SOLUTION: Performed by: INTERNAL MEDICINE

## 2021-01-01 PROCEDURE — 87635 SARS-COV-2 COVID-19 AMP PRB: CPT | Performed by: INTERNAL MEDICINE

## 2021-01-01 PROCEDURE — 80143 DRUG ASSAY ACETAMINOPHEN: CPT | Performed by: FAMILY MEDICINE

## 2021-01-01 PROCEDURE — 83605 ASSAY OF LACTIC ACID: CPT | Performed by: NURSE PRACTITIONER

## 2021-01-01 PROCEDURE — 0097U HC BIOFIRE FILMARRAY GI PANEL: CPT | Performed by: EMERGENCY MEDICINE

## 2021-01-01 PROCEDURE — 99214 OFFICE O/P EST MOD 30 MIN: CPT | Performed by: NURSE PRACTITIONER

## 2021-01-01 PROCEDURE — 25010000002 NALOXONE PER 1 MG: Performed by: EMERGENCY MEDICINE

## 2021-01-01 PROCEDURE — 97530 THERAPEUTIC ACTIVITIES: CPT

## 2021-01-01 PROCEDURE — 80053 COMPREHEN METABOLIC PANEL: CPT | Performed by: SURGERY

## 2021-01-01 PROCEDURE — 25010000003 HYDROMORPHONE 1 MG/ML SOLUTION: Performed by: EMERGENCY MEDICINE

## 2021-01-01 PROCEDURE — 97535 SELF CARE MNGMENT TRAINING: CPT

## 2021-01-01 PROCEDURE — 84145 PROCALCITONIN (PCT): CPT | Performed by: INTERNAL MEDICINE

## 2021-01-01 PROCEDURE — 94003 VENT MGMT INPAT SUBQ DAY: CPT

## 2021-01-01 PROCEDURE — 85027 COMPLETE CBC AUTOMATED: CPT | Performed by: INTERNAL MEDICINE

## 2021-01-01 PROCEDURE — 84484 ASSAY OF TROPONIN QUANT: CPT | Performed by: EMERGENCY MEDICINE

## 2021-01-01 PROCEDURE — 70450 CT HEAD/BRAIN W/O DYE: CPT

## 2021-01-01 PROCEDURE — 82803 BLOOD GASES ANY COMBINATION: CPT

## 2021-01-01 PROCEDURE — 25010000002 MEROPENEM PER 100 MG: Performed by: EMERGENCY MEDICINE

## 2021-01-01 PROCEDURE — 73030 X-RAY EXAM OF SHOULDER: CPT

## 2021-01-01 PROCEDURE — 87150 DNA/RNA AMPLIFIED PROBE: CPT | Performed by: EMERGENCY MEDICINE

## 2021-01-01 PROCEDURE — 36902 INTRO CATH DIALYSIS CIRCUIT: CPT | Performed by: SURGERY

## 2021-01-01 PROCEDURE — 87635 SARS-COV-2 COVID-19 AMP PRB: CPT | Performed by: SURGERY

## 2021-01-01 PROCEDURE — 83880 ASSAY OF NATRIURETIC PEPTIDE: CPT | Performed by: FAMILY MEDICINE

## 2021-01-01 PROCEDURE — 31500 INSERT EMERGENCY AIRWAY: CPT | Performed by: INTERNAL MEDICINE

## 2021-01-01 PROCEDURE — 99221 1ST HOSP IP/OBS SF/LOW 40: CPT | Performed by: SURGERY

## 2021-01-01 PROCEDURE — 25010000002 ZIPRASIDONE MESYLATE PER 10 MG: Performed by: INTERNAL MEDICINE

## 2021-01-01 PROCEDURE — 93010 ELECTROCARDIOGRAM REPORT: CPT | Performed by: INTERNAL MEDICINE

## 2021-01-01 PROCEDURE — 85610 PROTHROMBIN TIME: CPT | Performed by: FAMILY MEDICINE

## 2021-01-01 PROCEDURE — 25010000002 METHYLPREDNISOLONE PER 125 MG: Performed by: INTERNAL MEDICINE

## 2021-01-01 PROCEDURE — 92610 EVALUATE SWALLOWING FUNCTION: CPT | Performed by: SPEECH-LANGUAGE PATHOLOGIST

## 2021-01-01 PROCEDURE — 99222 1ST HOSP IP/OBS MODERATE 55: CPT | Performed by: INTERNAL MEDICINE

## 2021-01-01 PROCEDURE — 25010000002 PHENYLEPHRINE HCL 0.8 MG/10ML SOLUTION PREFILLED SYRINGE: Performed by: NURSE ANESTHETIST, CERTIFIED REGISTERED

## 2021-01-01 PROCEDURE — 97161 PT EVAL LOW COMPLEX 20 MIN: CPT | Performed by: PHYSICAL THERAPIST

## 2021-01-01 PROCEDURE — 87493 C DIFF AMPLIFIED PROBE: CPT | Performed by: INTERNAL MEDICINE

## 2021-01-01 PROCEDURE — 93005 ELECTROCARDIOGRAM TRACING: CPT | Performed by: NURSE PRACTITIONER

## 2021-01-01 PROCEDURE — 94640 AIRWAY INHALATION TREATMENT: CPT

## 2021-01-01 PROCEDURE — 25010000002 HALOPERIDOL LACTATE PER 5 MG: Performed by: FAMILY MEDICINE

## 2021-01-01 PROCEDURE — 25010000002 DIPHENHYDRAMINE PER 50 MG: Performed by: FAMILY MEDICINE

## 2021-01-01 PROCEDURE — 85730 THROMBOPLASTIN TIME PARTIAL: CPT | Performed by: INTERNAL MEDICINE

## 2021-01-01 PROCEDURE — 83735 ASSAY OF MAGNESIUM: CPT | Performed by: NURSE PRACTITIONER

## 2021-01-01 PROCEDURE — P9612 CATHETERIZE FOR URINE SPEC: HCPCS

## 2021-01-01 PROCEDURE — 25010000002 POTASSIUM CHLORIDE PER 2 MEQ: Performed by: INTERNAL MEDICINE

## 2021-01-01 PROCEDURE — 25010000002 EPINEPHRINE 1 MG/10ML SOLUTION PREFILLED SYRINGE: Performed by: INTERNAL MEDICINE

## 2021-01-01 PROCEDURE — 84145 PROCALCITONIN (PCT): CPT | Performed by: EMERGENCY MEDICINE

## 2021-01-01 PROCEDURE — 99283 EMERGENCY DEPT VISIT LOW MDM: CPT | Performed by: SURGERY

## 2021-01-01 PROCEDURE — 84145 PROCALCITONIN (PCT): CPT | Performed by: FAMILY MEDICINE

## 2021-01-01 PROCEDURE — 87040 BLOOD CULTURE FOR BACTERIA: CPT | Performed by: NURSE PRACTITIONER

## 2021-01-01 PROCEDURE — 83735 ASSAY OF MAGNESIUM: CPT | Performed by: FAMILY MEDICINE

## 2021-01-01 PROCEDURE — 80306 DRUG TEST PRSMV INSTRMNT: CPT | Performed by: FAMILY MEDICINE

## 2021-01-01 PROCEDURE — 86705 HEP B CORE ANTIBODY IGM: CPT | Performed by: INTERNAL MEDICINE

## 2021-01-01 PROCEDURE — 87635 SARS-COV-2 COVID-19 AMP PRB: CPT | Performed by: FAMILY MEDICINE

## 2021-01-01 PROCEDURE — C1751 CATH, INF, PER/CENT/MIDLINE: HCPCS

## 2021-01-01 PROCEDURE — 86850 RBC ANTIBODY SCREEN: CPT | Performed by: FAMILY MEDICINE

## 2021-01-01 PROCEDURE — 36600 WITHDRAWAL OF ARTERIAL BLOOD: CPT

## 2021-01-01 PROCEDURE — 80053 COMPREHEN METABOLIC PANEL: CPT | Performed by: NURSE PRACTITIONER

## 2021-01-01 PROCEDURE — 85007 BL SMEAR W/DIFF WBC COUNT: CPT | Performed by: NURSE PRACTITIONER

## 2021-01-01 PROCEDURE — 25010000003 LEVETIRACETAM IN NACL 0.75% 1000 MG/100ML SOLUTION: Performed by: PSYCHIATRY & NEUROLOGY

## 2021-01-01 PROCEDURE — 84484 ASSAY OF TROPONIN QUANT: CPT | Performed by: NURSE PRACTITIONER

## 2021-01-01 PROCEDURE — 25010000002 MIDAZOLAM PER 1 MG: Performed by: FAMILY MEDICINE

## 2021-01-01 PROCEDURE — 84466 ASSAY OF TRANSFERRIN: CPT | Performed by: INTERNAL MEDICINE

## 2021-01-01 PROCEDURE — G0378 HOSPITAL OBSERVATION PER HR: HCPCS

## 2021-01-01 PROCEDURE — 94002 VENT MGMT INPAT INIT DAY: CPT

## 2021-01-01 PROCEDURE — 82270 OCCULT BLOOD FECES: CPT | Performed by: FAMILY MEDICINE

## 2021-01-01 PROCEDURE — 72170 X-RAY EXAM OF PELVIS: CPT

## 2021-01-01 PROCEDURE — 86850 RBC ANTIBODY SCREEN: CPT | Performed by: INTERNAL MEDICINE

## 2021-01-01 PROCEDURE — 99232 SBSQ HOSP IP/OBS MODERATE 35: CPT | Performed by: SURGERY

## 2021-01-01 PROCEDURE — 25010000002 DIPHENHYDRAMINE PER 50 MG

## 2021-01-01 PROCEDURE — 93005 ELECTROCARDIOGRAM TRACING: CPT | Performed by: EMERGENCY MEDICINE

## 2021-01-01 PROCEDURE — 87147 CULTURE TYPE IMMUNOLOGIC: CPT | Performed by: EMERGENCY MEDICINE

## 2021-01-01 PROCEDURE — 87040 BLOOD CULTURE FOR BACTERIA: CPT | Performed by: INTERNAL MEDICINE

## 2021-01-01 PROCEDURE — 83735 ASSAY OF MAGNESIUM: CPT | Performed by: EMERGENCY MEDICINE

## 2021-01-01 PROCEDURE — 95819 EEG AWAKE AND ASLEEP: CPT

## 2021-01-01 PROCEDURE — 83605 ASSAY OF LACTIC ACID: CPT | Performed by: INTERNAL MEDICINE

## 2021-01-01 PROCEDURE — 63710000001 ONDANSETRON ODT 4 MG TABLET DISPERSIBLE: Performed by: EMERGENCY MEDICINE

## 2021-01-01 PROCEDURE — 71250 CT THORAX DX C-: CPT

## 2021-01-01 PROCEDURE — 87186 SC STD MICRODIL/AGAR DIL: CPT | Performed by: NURSE PRACTITIONER

## 2021-01-01 PROCEDURE — 25010000002 LORAZEPAM PER 2 MG: Performed by: INTERNAL MEDICINE

## 2021-01-01 PROCEDURE — 05763Z1 DILATION OF LEFT SUBCLAVIAN VEIN USING DRUG-COATED BALLOON, PERCUTANEOUS APPROACH: ICD-10-PCS | Performed by: SURGERY

## 2021-01-01 PROCEDURE — 80053 COMPREHEN METABOLIC PANEL: CPT | Performed by: FAMILY MEDICINE

## 2021-01-01 PROCEDURE — 25010000002 HYDROMORPHONE PER 4 MG: Performed by: EMERGENCY MEDICINE

## 2021-01-01 PROCEDURE — 87086 URINE CULTURE/COLONY COUNT: CPT | Performed by: NURSE PRACTITIONER

## 2021-01-01 PROCEDURE — 86901 BLOOD TYPING SEROLOGIC RH(D): CPT | Performed by: SURGERY

## 2021-01-01 PROCEDURE — B51W1ZZ FLUOROSCOPY OF DIALYSIS SHUNT/FISTULA USING LOW OSMOLAR CONTRAST: ICD-10-PCS | Performed by: SURGERY

## 2021-01-01 PROCEDURE — 85610 PROTHROMBIN TIME: CPT | Performed by: NURSE PRACTITIONER

## 2021-01-01 PROCEDURE — 73110 X-RAY EXAM OF WRIST: CPT

## 2021-01-01 PROCEDURE — 93306 TTE W/DOPPLER COMPLETE: CPT

## 2021-01-01 PROCEDURE — 25010000002 HEPARIN (PORCINE) PER 1000 UNITS: Performed by: INTERNAL MEDICINE

## 2021-01-01 PROCEDURE — 96372 THER/PROPH/DIAG INJ SC/IM: CPT

## 2021-01-01 PROCEDURE — 25010000002 CEFTRIAXONE PER 250 MG: Performed by: INTERNAL MEDICINE

## 2021-01-01 PROCEDURE — C9803 HOPD COVID-19 SPEC COLLECT: HCPCS | Performed by: INTERNAL MEDICINE

## 2021-01-01 PROCEDURE — 25010000002 CEFEPIME PER 500 MG: Performed by: INTERNAL MEDICINE

## 2021-01-01 PROCEDURE — 74176 CT ABD & PELVIS W/O CONTRAST: CPT

## 2021-01-01 PROCEDURE — 87077 CULTURE AEROBIC IDENTIFY: CPT | Performed by: NURSE PRACTITIONER

## 2021-01-01 PROCEDURE — C9803 HOPD COVID-19 SPEC COLLECT: HCPCS

## 2021-01-01 PROCEDURE — 25010000002 FUROSEMIDE PER 20 MG: Performed by: FAMILY MEDICINE

## 2021-01-01 PROCEDURE — 99233 SBSQ HOSP IP/OBS HIGH 50: CPT | Performed by: INTERNAL MEDICINE

## 2021-01-01 PROCEDURE — 99221 1ST HOSP IP/OBS SF/LOW 40: CPT | Performed by: NURSE PRACTITIONER

## 2021-01-01 PROCEDURE — 25010000002 PERFLUTREN 6.52 MG/ML SUSPENSION: Performed by: INTERNAL MEDICINE

## 2021-01-01 PROCEDURE — 86900 BLOOD TYPING SEROLOGIC ABO: CPT | Performed by: SURGERY

## 2021-01-01 PROCEDURE — 83540 ASSAY OF IRON: CPT | Performed by: INTERNAL MEDICINE

## 2021-01-01 PROCEDURE — C9803 HOPD COVID-19 SPEC COLLECT: HCPCS | Performed by: SURGERY

## 2021-01-01 PROCEDURE — 82746 ASSAY OF FOLIC ACID SERUM: CPT | Performed by: INTERNAL MEDICINE

## 2021-01-01 PROCEDURE — 84100 ASSAY OF PHOSPHORUS: CPT | Performed by: NURSE PRACTITIONER

## 2021-01-01 PROCEDURE — 82077 ASSAY SPEC XCP UR&BREATH IA: CPT | Performed by: FAMILY MEDICINE

## 2021-01-01 PROCEDURE — 25010000002 ALBUMIN HUMAN 25% PER 50 ML: Performed by: INTERNAL MEDICINE

## 2021-01-01 PROCEDURE — 85007 BL SMEAR W/DIFF WBC COUNT: CPT | Performed by: EMERGENCY MEDICINE

## 2021-01-01 PROCEDURE — 87040 BLOOD CULTURE FOR BACTERIA: CPT | Performed by: EMERGENCY MEDICINE

## 2021-01-01 PROCEDURE — 87493 C DIFF AMPLIFIED PROBE: CPT | Performed by: EMERGENCY MEDICINE

## 2021-01-01 PROCEDURE — 97166 OT EVAL MOD COMPLEX 45 MIN: CPT | Performed by: OCCUPATIONAL THERAPIST

## 2021-01-01 PROCEDURE — 82077 ASSAY SPEC XCP UR&BREATH IA: CPT | Performed by: NURSE PRACTITIONER

## 2021-01-01 PROCEDURE — 85730 THROMBOPLASTIN TIME PARTIAL: CPT | Performed by: FAMILY MEDICINE

## 2021-01-01 PROCEDURE — 36415 COLL VENOUS BLD VENIPUNCTURE: CPT

## 2021-01-01 PROCEDURE — 82728 ASSAY OF FERRITIN: CPT | Performed by: INTERNAL MEDICINE

## 2021-01-01 PROCEDURE — C1769 GUIDE WIRE: HCPCS | Performed by: SURGERY

## 2021-01-01 PROCEDURE — 85014 HEMATOCRIT: CPT | Performed by: INTERNAL MEDICINE

## 2021-01-01 PROCEDURE — 80143 DRUG ASSAY ACETAMINOPHEN: CPT | Performed by: NURSE PRACTITIONER

## 2021-01-01 PROCEDURE — 94660 CPAP INITIATION&MGMT: CPT

## 2021-01-01 PROCEDURE — 82140 ASSAY OF AMMONIA: CPT | Performed by: FAMILY MEDICINE

## 2021-01-01 PROCEDURE — 83605 ASSAY OF LACTIC ACID: CPT | Performed by: FAMILY MEDICINE

## 2021-01-01 PROCEDURE — 25010000002 FENTANYL CITRATE (PF) 100 MCG/2ML SOLUTION: Performed by: NURSE ANESTHETIST, CERTIFIED REGISTERED

## 2021-01-01 PROCEDURE — 85610 PROTHROMBIN TIME: CPT | Performed by: INTERNAL MEDICINE

## 2021-01-01 PROCEDURE — 73130 X-RAY EXAM OF HAND: CPT

## 2021-01-01 PROCEDURE — 92526 ORAL FUNCTION THERAPY: CPT

## 2021-01-01 PROCEDURE — 93306 TTE W/DOPPLER COMPLETE: CPT | Performed by: INTERNAL MEDICINE

## 2021-01-01 PROCEDURE — 25010000002 PROPOFOL 10 MG/ML EMULSION: Performed by: NURSE ANESTHETIST, CERTIFIED REGISTERED

## 2021-01-01 PROCEDURE — S0260 H&P FOR SURGERY: HCPCS | Performed by: SURGERY

## 2021-01-01 PROCEDURE — 81001 URINALYSIS AUTO W/SCOPE: CPT | Performed by: FAMILY MEDICINE

## 2021-01-01 PROCEDURE — 99291 CRITICAL CARE FIRST HOUR: CPT | Performed by: PSYCHIATRY & NEUROLOGY

## 2021-01-01 PROCEDURE — 84100 ASSAY OF PHOSPHORUS: CPT | Performed by: EMERGENCY MEDICINE

## 2021-01-01 PROCEDURE — 87635 SARS-COV-2 COVID-19 AMP PRB: CPT | Performed by: NURSE PRACTITIONER

## 2021-01-01 PROCEDURE — 25010000002 HEPARIN (PORCINE) PER 1000 UNITS: Performed by: SURGERY

## 2021-01-01 PROCEDURE — 82550 ASSAY OF CK (CPK): CPT | Performed by: NURSE PRACTITIONER

## 2021-01-01 PROCEDURE — 0097U HC BIOFIRE FILMARRAY GI PANEL: CPT | Performed by: INTERNAL MEDICINE

## 2021-01-01 PROCEDURE — 25010000002 IOPAMIDOL 61 % SOLUTION: Performed by: SURGERY

## 2021-01-01 PROCEDURE — 92523 SPEECH SOUND LANG COMPREHEN: CPT | Performed by: SPEECH-LANGUAGE PATHOLOGIST

## 2021-01-01 PROCEDURE — 97162 PT EVAL MOD COMPLEX 30 MIN: CPT | Performed by: PHYSICAL THERAPIST

## 2021-01-01 PROCEDURE — 76000 FLUOROSCOPY <1 HR PHYS/QHP: CPT

## 2021-01-01 PROCEDURE — 82607 VITAMIN B-12: CPT | Performed by: INTERNAL MEDICINE

## 2021-01-01 PROCEDURE — 85045 AUTOMATED RETICULOCYTE COUNT: CPT | Performed by: INTERNAL MEDICINE

## 2021-01-01 PROCEDURE — 70551 MRI BRAIN STEM W/O DYE: CPT

## 2021-01-01 PROCEDURE — 25010000002 LORAZEPAM PER 2 MG: Performed by: FAMILY MEDICINE

## 2021-01-01 PROCEDURE — U0004 COV-19 TEST NON-CDC HGH THRU: HCPCS | Performed by: INTERNAL MEDICINE

## 2021-01-01 PROCEDURE — 0HQ1XZZ REPAIR FACE SKIN, EXTERNAL APPROACH: ICD-10-PCS | Performed by: INTERNAL MEDICINE

## 2021-01-01 PROCEDURE — 85730 THROMBOPLASTIN TIME PARTIAL: CPT | Performed by: NURSE PRACTITIONER

## 2021-01-01 PROCEDURE — 25010000002 CEFEPIME PER 500 MG: Performed by: NURSE PRACTITIONER

## 2021-01-01 PROCEDURE — 80306 DRUG TEST PRSMV INSTRMNT: CPT | Performed by: EMERGENCY MEDICINE

## 2021-01-01 PROCEDURE — 0BH18EZ INSERTION OF ENDOTRACHEAL AIRWAY INTO TRACHEA, VIA NATURAL OR ARTIFICIAL OPENING ENDOSCOPIC: ICD-10-PCS | Performed by: INTERNAL MEDICINE

## 2021-01-01 PROCEDURE — 86140 C-REACTIVE PROTEIN: CPT | Performed by: EMERGENCY MEDICINE

## 2021-01-01 PROCEDURE — 25010000002 ONDANSETRON PER 1 MG: Performed by: EMERGENCY MEDICINE

## 2021-01-01 PROCEDURE — 36415 COLL VENOUS BLD VENIPUNCTURE: CPT | Performed by: FAMILY MEDICINE

## 2021-01-01 PROCEDURE — 86900 BLOOD TYPING SEROLOGIC ABO: CPT | Performed by: FAMILY MEDICINE

## 2021-01-01 RX ORDER — NALOXONE HCL 0.4 MG/ML
0.4 VIAL (ML) INJECTION ONCE
Status: COMPLETED | OUTPATIENT
Start: 2021-01-01 | End: 2021-01-01

## 2021-01-01 RX ORDER — ALBUTEROL SULFATE 2.5 MG/3ML
2.5 SOLUTION RESPIRATORY (INHALATION) EVERY 4 HOURS PRN
Status: DISCONTINUED | OUTPATIENT
Start: 2021-01-01 | End: 2021-01-01 | Stop reason: HOSPADM

## 2021-01-01 RX ORDER — MORPHINE SULFATE 2 MG/ML
2 INJECTION, SOLUTION INTRAMUSCULAR; INTRAVENOUS ONCE
Status: COMPLETED | OUTPATIENT
Start: 2021-01-01 | End: 2021-01-01

## 2021-01-01 RX ORDER — SODIUM CHLORIDE 0.9 % (FLUSH) 0.9 %
10 SYRINGE (ML) INJECTION AS NEEDED
Status: DISCONTINUED | OUTPATIENT
Start: 2021-01-01 | End: 2021-01-01 | Stop reason: SDUPTHER

## 2021-01-01 RX ORDER — LOSARTAN POTASSIUM 50 MG/1
50 TABLET ORAL 2 TIMES DAILY
Status: DISCONTINUED | OUTPATIENT
Start: 2021-01-01 | End: 2021-01-01

## 2021-01-01 RX ORDER — FLUTICASONE PROPIONATE 50 MCG
2 SPRAY, SUSPENSION (ML) NASAL 2 TIMES DAILY
Status: DISCONTINUED | OUTPATIENT
Start: 2021-01-01 | End: 2021-01-01 | Stop reason: HOSPADM

## 2021-01-01 RX ORDER — NICOTINE POLACRILEX 4 MG
15 LOZENGE BUCCAL
Status: DISCONTINUED | OUTPATIENT
Start: 2021-01-01 | End: 2021-01-01

## 2021-01-01 RX ORDER — DORZOLAMIDE HCL 20 MG/ML
1 SOLUTION/ DROPS OPHTHALMIC 3 TIMES DAILY
Status: DISCONTINUED | OUTPATIENT
Start: 2021-01-01 | End: 2021-01-01 | Stop reason: HOSPADM

## 2021-01-01 RX ORDER — CHOLESTYRAMINE 4 G/9G
1 POWDER, FOR SUSPENSION ORAL EVERY 8 HOURS SCHEDULED
Status: DISCONTINUED | OUTPATIENT
Start: 2021-01-01 | End: 2021-01-01

## 2021-01-01 RX ORDER — LEVOTHYROXINE SODIUM 0.03 MG/1
25 TABLET ORAL DAILY
Qty: 30 TABLET | Refills: 0 | Status: ON HOLD | OUTPATIENT
Start: 2021-01-01 | End: 2021-01-01

## 2021-01-01 RX ORDER — ONDANSETRON 2 MG/ML
4 INJECTION INTRAMUSCULAR; INTRAVENOUS EVERY 6 HOURS PRN
Status: DISCONTINUED | OUTPATIENT
Start: 2021-01-01 | End: 2021-01-01 | Stop reason: HOSPADM

## 2021-01-01 RX ORDER — ALBUTEROL SULFATE 90 UG/1
AEROSOL, METERED RESPIRATORY (INHALATION) AS NEEDED
Status: DISCONTINUED | OUTPATIENT
Start: 2021-01-01 | End: 2021-01-01 | Stop reason: SURG

## 2021-01-01 RX ORDER — BRIMONIDINE TARTRATE 2 MG/ML
1 SOLUTION/ DROPS OPHTHALMIC 3 TIMES DAILY
Status: DISCONTINUED | OUTPATIENT
Start: 2021-01-01 | End: 2021-01-01

## 2021-01-01 RX ORDER — LIDOCAINE HYDROCHLORIDE 10 MG/ML
0.5 INJECTION, SOLUTION EPIDURAL; INFILTRATION; INTRACAUDAL; PERINEURAL ONCE AS NEEDED
Status: DISCONTINUED | OUTPATIENT
Start: 2021-01-01 | End: 2021-01-01 | Stop reason: SDUPTHER

## 2021-01-01 RX ORDER — SEVELAMER CARBONATE 800 MG/1
800 TABLET, FILM COATED ORAL
Status: DISCONTINUED | OUTPATIENT
Start: 2021-01-01 | End: 2021-01-01 | Stop reason: HOSPADM

## 2021-01-01 RX ORDER — PREGABALIN 25 MG/1
25 CAPSULE ORAL DAILY
Status: DISCONTINUED | OUTPATIENT
Start: 2021-01-01 | End: 2021-01-01

## 2021-01-01 RX ORDER — ALBUMIN (HUMAN) 12.5 G/50ML
12.5 SOLUTION INTRAVENOUS AS NEEDED
Status: CANCELLED | OUTPATIENT
Start: 2021-01-01 | End: 2021-01-01

## 2021-01-01 RX ORDER — NICOTINE POLACRILEX 4 MG
15 LOZENGE BUCCAL
Status: DISCONTINUED | OUTPATIENT
Start: 2021-01-01 | End: 2021-01-01 | Stop reason: HOSPADM

## 2021-01-01 RX ORDER — LIDOCAINE HYDROCHLORIDE 10 MG/ML
0.5 INJECTION, SOLUTION EPIDURAL; INFILTRATION; INTRACAUDAL; PERINEURAL ONCE AS NEEDED
Status: DISCONTINUED | OUTPATIENT
Start: 2021-01-01 | End: 2021-01-01 | Stop reason: HOSPADM

## 2021-01-01 RX ORDER — VANCOMYCIN HYDROCHLORIDE 1 G/200ML
1000 INJECTION, SOLUTION INTRAVENOUS ONCE
Status: COMPLETED | OUTPATIENT
Start: 2021-01-01 | End: 2021-01-01

## 2021-01-01 RX ORDER — FLUMAZENIL 0.1 MG/ML
0.2 INJECTION INTRAVENOUS AS NEEDED
Status: DISCONTINUED | OUTPATIENT
Start: 2021-01-01 | End: 2021-01-01 | Stop reason: HOSPADM

## 2021-01-01 RX ORDER — TIMOLOL MALEATE 5 MG/ML
1 SOLUTION/ DROPS OPHTHALMIC
Status: DISCONTINUED | OUTPATIENT
Start: 2021-01-01 | End: 2021-01-01 | Stop reason: HOSPADM

## 2021-01-01 RX ORDER — OXYCODONE AND ACETAMINOPHEN 7.5; 325 MG/1; MG/1
1 TABLET ORAL EVERY 6 HOURS PRN
Status: DISCONTINUED | OUTPATIENT
Start: 2021-01-01 | End: 2021-01-01 | Stop reason: HOSPADM

## 2021-01-01 RX ORDER — OXYCODONE AND ACETAMINOPHEN 7.5; 325 MG/1; MG/1
1 TABLET ORAL EVERY 8 HOURS PRN
COMMUNITY
End: 2021-01-01 | Stop reason: HOSPADM

## 2021-01-01 RX ORDER — ACETAMINOPHEN 325 MG/1
650 TABLET ORAL EVERY 4 HOURS PRN
Status: DISCONTINUED | OUTPATIENT
Start: 2021-01-01 | End: 2021-01-01 | Stop reason: HOSPADM

## 2021-01-01 RX ORDER — BRIMONIDINE TARTRATE 2 MG/ML
1 SOLUTION/ DROPS OPHTHALMIC 2 TIMES DAILY
Status: DISCONTINUED | OUTPATIENT
Start: 2021-01-01 | End: 2021-01-01

## 2021-01-01 RX ORDER — SUCROFERRIC OXYHYDROXIDE 500 MG/1
500 TABLET, CHEWABLE ORAL
Status: ON HOLD | COMMUNITY
End: 2021-01-01 | Stop reason: ALTCHOICE

## 2021-01-01 RX ORDER — ATROPINE SULFATE 10 MG/ML
1 SOLUTION/ DROPS OPHTHALMIC DAILY
Status: DISCONTINUED | OUTPATIENT
Start: 2021-01-01 | End: 2021-01-01 | Stop reason: HOSPADM

## 2021-01-01 RX ORDER — MIDODRINE HYDROCHLORIDE 5 MG/1
5 TABLET ORAL
Status: ON HOLD | COMMUNITY
End: 2021-01-01

## 2021-01-01 RX ORDER — L.ACID,PARA/B.BIFIDUM/S.THERM 8B CELL
1 CAPSULE ORAL DAILY
Status: DISCONTINUED | OUTPATIENT
Start: 2021-01-01 | End: 2021-01-01 | Stop reason: HOSPADM

## 2021-01-01 RX ORDER — TIMOLOL MALEATE 5 MG/ML
1 SOLUTION/ DROPS OPHTHALMIC DAILY
Status: DISCONTINUED | OUTPATIENT
Start: 2021-01-01 | End: 2021-01-01 | Stop reason: HOSPADM

## 2021-01-01 RX ORDER — FAMOTIDINE 20 MG/1
20 TABLET, FILM COATED ORAL 2 TIMES DAILY
Status: DISCONTINUED | OUTPATIENT
Start: 2021-01-01 | End: 2021-01-01

## 2021-01-01 RX ORDER — SODIUM CHLORIDE 0.9 % (FLUSH) 0.9 %
10 SYRINGE (ML) INJECTION AS NEEDED
Status: DISCONTINUED | OUTPATIENT
Start: 2021-01-01 | End: 2021-01-01

## 2021-01-01 RX ORDER — ONDANSETRON 4 MG/1
4 TABLET, FILM COATED ORAL EVERY 6 HOURS PRN
Status: DISCONTINUED | OUTPATIENT
Start: 2021-01-01 | End: 2021-01-01 | Stop reason: HOSPADM

## 2021-01-01 RX ORDER — MELATONIN
2000 DAILY
Status: DISCONTINUED | OUTPATIENT
Start: 2021-01-01 | End: 2021-01-01 | Stop reason: HOSPADM

## 2021-01-01 RX ORDER — ONDANSETRON 2 MG/ML
4 INJECTION INTRAMUSCULAR; INTRAVENOUS ONCE
Status: COMPLETED | OUTPATIENT
Start: 2021-01-01 | End: 2021-01-01

## 2021-01-01 RX ORDER — CLINDAMYCIN PHOSPHATE 900 MG/50ML
900 INJECTION INTRAVENOUS ONCE
Status: COMPLETED | OUTPATIENT
Start: 2021-01-01 | End: 2021-01-01

## 2021-01-01 RX ORDER — DEXTROSE MONOHYDRATE 25 G/50ML
25 INJECTION, SOLUTION INTRAVENOUS ONCE
Status: COMPLETED | OUTPATIENT
Start: 2021-01-01 | End: 2021-01-01

## 2021-01-01 RX ORDER — ACETAMINOPHEN 500 MG
1000 TABLET ORAL ONCE
Status: COMPLETED | OUTPATIENT
Start: 2021-01-01 | End: 2021-01-01

## 2021-01-01 RX ORDER — SEVELAMER HYDROCHLORIDE 800 MG/1
800 TABLET, FILM COATED ORAL
COMMUNITY

## 2021-01-01 RX ORDER — ACETAMINOPHEN 325 MG/1
650 TABLET ORAL EVERY 4 HOURS PRN
Status: DISCONTINUED | OUTPATIENT
Start: 2021-01-01 | End: 2021-07-11 | Stop reason: HOSPADM

## 2021-01-01 RX ORDER — PANTOPRAZOLE SODIUM 40 MG/1
40 TABLET, DELAYED RELEASE ORAL
Status: DISCONTINUED | OUTPATIENT
Start: 2021-01-01 | End: 2021-01-01 | Stop reason: HOSPADM

## 2021-01-01 RX ORDER — ONDANSETRON 4 MG/1
4 TABLET, ORALLY DISINTEGRATING ORAL ONCE
Status: COMPLETED | OUTPATIENT
Start: 2021-01-01 | End: 2021-01-01

## 2021-01-01 RX ORDER — SODIUM CHLORIDE 0.9 % (FLUSH) 0.9 %
10 SYRINGE (ML) INJECTION AS NEEDED
Status: DISCONTINUED | OUTPATIENT
Start: 2021-01-01 | End: 2021-01-01 | Stop reason: HOSPADM

## 2021-01-01 RX ORDER — FENTANYL CITRATE 50 UG/ML
INJECTION, SOLUTION INTRAMUSCULAR; INTRAVENOUS AS NEEDED
Status: DISCONTINUED | OUTPATIENT
Start: 2021-01-01 | End: 2021-01-01 | Stop reason: SURG

## 2021-01-01 RX ORDER — ALBUTEROL SULFATE 2.5 MG/3ML
2.5 SOLUTION RESPIRATORY (INHALATION) EVERY 6 HOURS PRN
Status: DISCONTINUED | OUTPATIENT
Start: 2021-01-01 | End: 2021-01-01 | Stop reason: ALTCHOICE

## 2021-01-01 RX ORDER — LEVOTHYROXINE SODIUM 0.03 MG/1
25 TABLET ORAL
Status: DISCONTINUED | OUTPATIENT
Start: 2021-01-01 | End: 2021-01-01 | Stop reason: HOSPADM

## 2021-01-01 RX ORDER — LOPERAMIDE HYDROCHLORIDE 2 MG/1
4 CAPSULE ORAL ONCE
Status: COMPLETED | OUTPATIENT
Start: 2021-01-01 | End: 2021-01-01

## 2021-01-01 RX ORDER — LOSARTAN POTASSIUM 50 MG/1
50 TABLET ORAL DAILY
Status: DISCONTINUED | OUTPATIENT
Start: 2021-01-01 | End: 2021-01-01

## 2021-01-01 RX ORDER — SERTRALINE HYDROCHLORIDE 25 MG/1
25 TABLET, FILM COATED ORAL DAILY
Status: DISCONTINUED | OUTPATIENT
Start: 2021-01-01 | End: 2021-01-01 | Stop reason: HOSPADM

## 2021-01-01 RX ORDER — ALBUTEROL SULFATE 2.5 MG/3ML
2.5 SOLUTION RESPIRATORY (INHALATION) EVERY 6 HOURS PRN
Status: DISCONTINUED | OUTPATIENT
Start: 2021-01-01 | End: 2021-01-01 | Stop reason: SDUPTHER

## 2021-01-01 RX ORDER — LOSARTAN POTASSIUM 50 MG/1
50 TABLET ORAL 2 TIMES DAILY
COMMUNITY
End: 2021-01-01 | Stop reason: HOSPADM

## 2021-01-01 RX ORDER — PROPOFOL 10 MG/ML
INJECTION, EMULSION INTRAVENOUS
Status: COMPLETED
Start: 2021-01-01 | End: 2021-01-01

## 2021-01-01 RX ORDER — SACCHAROMYCES BOULARDII 250 MG
500 CAPSULE ORAL 2 TIMES DAILY
Status: DISCONTINUED | OUTPATIENT
Start: 2021-01-01 | End: 2021-01-01

## 2021-01-01 RX ORDER — DEXTROSE MONOHYDRATE 25 G/50ML
25 INJECTION, SOLUTION INTRAVENOUS
Status: DISCONTINUED | OUTPATIENT
Start: 2021-01-01 | End: 2021-01-01 | Stop reason: HOSPADM

## 2021-01-01 RX ORDER — LIDOCAINE HYDROCHLORIDE 10 MG/ML
10 INJECTION, SOLUTION INFILTRATION; PERINEURAL ONCE
Status: COMPLETED | OUTPATIENT
Start: 2021-01-01 | End: 2021-01-01

## 2021-01-01 RX ORDER — ALBUTEROL SULFATE 2.5 MG/3ML
2.5 SOLUTION RESPIRATORY (INHALATION)
Status: DISCONTINUED | OUTPATIENT
Start: 2021-01-01 | End: 2021-01-01 | Stop reason: HOSPADM

## 2021-01-01 RX ORDER — DEXTROSE MONOHYDRATE 100 MG/ML
75 INJECTION, SOLUTION INTRAVENOUS CONTINUOUS
Status: DISCONTINUED | OUTPATIENT
Start: 2021-01-01 | End: 2021-01-01

## 2021-01-01 RX ORDER — WHEY PROTEIN ISOLATE 6 G-25/7 G
1 POWDER (GRAM) ORAL 3 TIMES DAILY
Status: DISCONTINUED | OUTPATIENT
Start: 2021-01-01 | End: 2021-01-01

## 2021-01-01 RX ORDER — TRAMADOL HYDROCHLORIDE 50 MG/1
25 TABLET ORAL EVERY 6 HOURS PRN
Status: DISCONTINUED | OUTPATIENT
Start: 2021-01-01 | End: 2021-01-01 | Stop reason: HOSPADM

## 2021-01-01 RX ORDER — IPRATROPIUM BROMIDE AND ALBUTEROL SULFATE 2.5; .5 MG/3ML; MG/3ML
3 SOLUTION RESPIRATORY (INHALATION) EVERY 4 HOURS PRN
Status: DISCONTINUED | OUTPATIENT
Start: 2021-01-01 | End: 2021-01-01 | Stop reason: HOSPADM

## 2021-01-01 RX ORDER — DORZOLAMIDE HCL 20 MG/ML
1 SOLUTION/ DROPS OPHTHALMIC 2 TIMES DAILY
Status: DISCONTINUED | OUTPATIENT
Start: 2021-01-01 | End: 2021-01-01 | Stop reason: HOSPADM

## 2021-01-01 RX ORDER — CARVEDILOL 6.25 MG/1
12.5 TABLET ORAL 2 TIMES DAILY WITH MEALS
Status: DISCONTINUED | OUTPATIENT
Start: 2021-01-01 | End: 2021-01-01

## 2021-01-01 RX ORDER — SODIUM CHLORIDE 0.9 % (FLUSH) 0.9 %
10 SYRINGE (ML) INJECTION EVERY 12 HOURS SCHEDULED
Status: DISCONTINUED | OUTPATIENT
Start: 2021-01-01 | End: 2021-01-01

## 2021-01-01 RX ORDER — HALOPERIDOL 5 MG/ML
2 INJECTION INTRAMUSCULAR ONCE
Status: DISCONTINUED | OUTPATIENT
Start: 2021-01-01 | End: 2021-01-01 | Stop reason: SDUPTHER

## 2021-01-01 RX ORDER — DIPHENHYDRAMINE HYDROCHLORIDE 50 MG/ML
INJECTION INTRAMUSCULAR; INTRAVENOUS
Status: COMPLETED
Start: 2021-01-01 | End: 2021-01-01

## 2021-01-01 RX ORDER — LEVOTHYROXINE SODIUM 0.03 MG/1
25 TABLET ORAL
Status: DISCONTINUED | OUTPATIENT
Start: 2021-01-01 | End: 2021-01-01

## 2021-01-01 RX ORDER — FAMOTIDINE 20 MG/1
20 TABLET, FILM COATED ORAL DAILY
Status: DISCONTINUED | OUTPATIENT
Start: 2021-01-01 | End: 2021-01-01

## 2021-01-01 RX ORDER — ALBUMIN (HUMAN) 12.5 G/50ML
12.5 SOLUTION INTRAVENOUS AS NEEDED
Status: DISCONTINUED | OUTPATIENT
Start: 2021-01-01 | End: 2021-01-01

## 2021-01-01 RX ORDER — TIMOLOL MALEATE 5 MG/ML
1 SOLUTION/ DROPS OPHTHALMIC EVERY MORNING
Refills: 12
Start: 2021-01-01

## 2021-01-01 RX ORDER — ALBUTEROL SULFATE 90 UG/1
2 AEROSOL, METERED RESPIRATORY (INHALATION) EVERY 6 HOURS PRN
Status: DISCONTINUED | OUTPATIENT
Start: 2021-01-01 | End: 2021-01-01 | Stop reason: ALTCHOICE

## 2021-01-01 RX ORDER — PREGABALIN 50 MG/1
50 CAPSULE ORAL EVERY 12 HOURS SCHEDULED
Status: DISCONTINUED | OUTPATIENT
Start: 2021-01-01 | End: 2021-01-01 | Stop reason: HOSPADM

## 2021-01-01 RX ORDER — ACETAMINOPHEN 650 MG/1
650 SUPPOSITORY RECTAL EVERY 4 HOURS PRN
Status: DISCONTINUED | OUTPATIENT
Start: 2021-01-01 | End: 2021-07-11 | Stop reason: HOSPADM

## 2021-01-01 RX ORDER — HALOPERIDOL 5 MG/ML
2 INJECTION INTRAMUSCULAR ONCE
Status: DISCONTINUED | OUTPATIENT
Start: 2021-01-01 | End: 2021-01-01 | Stop reason: HOSPADM

## 2021-01-01 RX ORDER — PRAVASTATIN SODIUM 20 MG
10 TABLET ORAL NIGHTLY
Status: DISCONTINUED | OUTPATIENT
Start: 2021-01-01 | End: 2021-01-01 | Stop reason: HOSPADM

## 2021-01-01 RX ORDER — LIDOCAINE HYDROCHLORIDE 10 MG/ML
20 INJECTION, SOLUTION INFILTRATION; PERINEURAL ONCE
Status: COMPLETED | OUTPATIENT
Start: 2021-01-01 | End: 2021-01-01

## 2021-01-01 RX ORDER — ACETAMINOPHEN 650 MG/1
650 SUPPOSITORY RECTAL EVERY 4 HOURS PRN
Status: DISCONTINUED | OUTPATIENT
Start: 2021-01-01 | End: 2021-01-01 | Stop reason: HOSPADM

## 2021-01-01 RX ORDER — METOCLOPRAMIDE 5 MG/1
5 TABLET ORAL
Start: 2021-01-01 | End: 2021-01-01 | Stop reason: HOSPADM

## 2021-01-01 RX ORDER — LOSARTAN POTASSIUM 50 MG/1
50 TABLET ORAL DAILY
COMMUNITY
End: 2021-01-01 | Stop reason: HOSPADM

## 2021-01-01 RX ORDER — VANCOMYCIN
125 KIT EVERY 6 HOURS SCHEDULED
Status: DISCONTINUED | OUTPATIENT
Start: 2021-01-01 | End: 2021-01-01

## 2021-01-01 RX ORDER — L.ACID,PARA/B.BIFIDUM/S.THERM 8B CELL
1 CAPSULE ORAL DAILY
Qty: 30 CAPSULE | Refills: 2 | Status: ON HOLD | OUTPATIENT
Start: 2021-01-01 | End: 2021-01-01

## 2021-01-01 RX ORDER — ONDANSETRON 2 MG/ML
4 INJECTION INTRAMUSCULAR; INTRAVENOUS EVERY 6 HOURS PRN
Status: DISCONTINUED | OUTPATIENT
Start: 2021-01-01 | End: 2021-07-11 | Stop reason: HOSPADM

## 2021-01-01 RX ORDER — TRAMADOL HYDROCHLORIDE 50 MG/1
50 TABLET ORAL EVERY 8 HOURS PRN
COMMUNITY
End: 2021-01-01 | Stop reason: HOSPADM

## 2021-01-01 RX ORDER — PHENYLEPHRINE HCL IN 0.9% NACL 0.8MG/10ML
SYRINGE (ML) INTRAVENOUS AS NEEDED
Status: DISCONTINUED | OUTPATIENT
Start: 2021-01-01 | End: 2021-01-01 | Stop reason: SURG

## 2021-01-01 RX ORDER — ZIPRASIDONE MESYLATE 20 MG/ML
10 INJECTION, POWDER, LYOPHILIZED, FOR SOLUTION INTRAMUSCULAR ONCE
Status: COMPLETED | OUTPATIENT
Start: 2021-01-01 | End: 2021-01-01

## 2021-01-01 RX ORDER — METHYLPREDNISOLONE SODIUM SUCCINATE 125 MG/2ML
125 INJECTION, POWDER, LYOPHILIZED, FOR SOLUTION INTRAMUSCULAR; INTRAVENOUS ONCE
Status: COMPLETED | OUTPATIENT
Start: 2021-01-01 | End: 2021-01-01

## 2021-01-01 RX ORDER — IPRATROPIUM BROMIDE AND ALBUTEROL SULFATE 2.5; .5 MG/3ML; MG/3ML
3 SOLUTION RESPIRATORY (INHALATION) EVERY 6 HOURS PRN
COMMUNITY

## 2021-01-01 RX ORDER — SODIUM CHLORIDE 0.9 % (FLUSH) 0.9 %
3-10 SYRINGE (ML) INJECTION AS NEEDED
Status: DISCONTINUED | OUTPATIENT
Start: 2021-01-01 | End: 2021-01-01 | Stop reason: HOSPADM

## 2021-01-01 RX ORDER — DEXTROSE AND SODIUM CHLORIDE 5; .9 G/100ML; G/100ML
100 INJECTION, SOLUTION INTRAVENOUS CONTINUOUS
Status: DISCONTINUED | OUTPATIENT
Start: 2021-01-01 | End: 2021-01-01

## 2021-01-01 RX ORDER — ROSUVASTATIN CALCIUM 5 MG/1
5 TABLET, COATED ORAL
COMMUNITY
End: 2021-01-01 | Stop reason: HOSPADM

## 2021-01-01 RX ORDER — MORPHINE SULFATE 2 MG/ML
0.5 INJECTION, SOLUTION INTRAMUSCULAR; INTRAVENOUS EVERY 4 HOURS PRN
Status: DISCONTINUED | OUTPATIENT
Start: 2021-01-01 | End: 2021-01-01

## 2021-01-01 RX ORDER — ATROPINE SULFATE 10 MG/ML
1 SOLUTION/ DROPS OPHTHALMIC DAILY
Status: DISCONTINUED | OUTPATIENT
Start: 2021-01-01 | End: 2021-01-01

## 2021-01-01 RX ORDER — ONDANSETRON 4 MG/1
4 TABLET, FILM COATED ORAL EVERY 6 HOURS PRN
Status: DISCONTINUED | OUTPATIENT
Start: 2021-01-01 | End: 2021-01-01

## 2021-01-01 RX ORDER — HALOPERIDOL 5 MG/ML
2 INJECTION INTRAMUSCULAR ONCE
Status: COMPLETED | OUTPATIENT
Start: 2021-01-01 | End: 2021-01-01

## 2021-01-01 RX ORDER — FAMOTIDINE 20 MG/1
20 TABLET, FILM COATED ORAL DAILY
Status: DISCONTINUED | OUTPATIENT
Start: 2021-01-01 | End: 2021-01-01 | Stop reason: HOSPADM

## 2021-01-01 RX ORDER — TIMOLOL MALEATE 5 MG/ML
1 SOLUTION/ DROPS OPHTHALMIC EVERY MORNING
Status: DISCONTINUED | OUTPATIENT
Start: 2021-01-01 | End: 2021-01-01 | Stop reason: HOSPADM

## 2021-01-01 RX ORDER — HYDROMORPHONE HYDROCHLORIDE 1 MG/ML
0.5 INJECTION, SOLUTION INTRAMUSCULAR; INTRAVENOUS; SUBCUTANEOUS ONCE
Status: COMPLETED | OUTPATIENT
Start: 2021-01-01 | End: 2021-01-01

## 2021-01-01 RX ORDER — LORAZEPAM 2 MG/ML
2 INJECTION INTRAMUSCULAR ONCE
Status: COMPLETED | OUTPATIENT
Start: 2021-01-01 | End: 2021-01-01

## 2021-01-01 RX ORDER — TRAMADOL HYDROCHLORIDE 50 MG/1
25 TABLET ORAL EVERY 6 HOURS PRN
Qty: 6 TABLET | Refills: 0 | Status: SHIPPED | OUTPATIENT
Start: 2021-01-01 | End: 2021-01-01

## 2021-01-01 RX ORDER — FENTANYL CITRATE 50 UG/ML
25 INJECTION, SOLUTION INTRAMUSCULAR; INTRAVENOUS
Status: DISCONTINUED | OUTPATIENT
Start: 2021-01-01 | End: 2021-01-01 | Stop reason: HOSPADM

## 2021-01-01 RX ORDER — PRAVASTATIN SODIUM 10 MG
10 TABLET ORAL NIGHTLY
Status: ON HOLD
Start: 2021-01-01 | End: 2021-01-01 | Stop reason: SDDI

## 2021-01-01 RX ORDER — DIPHENHYDRAMINE HYDROCHLORIDE 50 MG/ML
25 INJECTION INTRAMUSCULAR; INTRAVENOUS ONCE
Status: COMPLETED | OUTPATIENT
Start: 2021-01-01 | End: 2021-01-01

## 2021-01-01 RX ORDER — ALBUMIN (HUMAN) 12.5 G/50ML
12.5 SOLUTION INTRAVENOUS AS NEEDED
Status: DISPENSED | OUTPATIENT
Start: 2021-01-01 | End: 2021-01-01

## 2021-01-01 RX ORDER — MIDAZOLAM HYDROCHLORIDE 1 MG/ML
1 INJECTION INTRAMUSCULAR; INTRAVENOUS
Status: DISCONTINUED | OUTPATIENT
Start: 2021-01-01 | End: 2021-01-01 | Stop reason: HOSPADM

## 2021-01-01 RX ORDER — PRAVASTATIN SODIUM 20 MG
10 TABLET ORAL NIGHTLY
Status: DISCONTINUED | OUTPATIENT
Start: 2021-01-01 | End: 2021-01-01

## 2021-01-01 RX ORDER — SEVELAMER CARBONATE 800 MG/1
2400 TABLET, FILM COATED ORAL
Status: DISCONTINUED | OUTPATIENT
Start: 2021-01-01 | End: 2021-01-01 | Stop reason: HOSPADM

## 2021-01-01 RX ORDER — CARVEDILOL 6.25 MG/1
6.25 TABLET ORAL 2 TIMES DAILY WITH MEALS
Status: DISCONTINUED | OUTPATIENT
Start: 2021-01-01 | End: 2021-01-01

## 2021-01-01 RX ORDER — SODIUM CHLORIDE 0.9 % (FLUSH) 0.9 %
3 SYRINGE (ML) INJECTION AS NEEDED
Status: DISCONTINUED | OUTPATIENT
Start: 2021-01-01 | End: 2021-01-01 | Stop reason: HOSPADM

## 2021-01-01 RX ORDER — METHYLPREDNISOLONE SODIUM SUCCINATE 125 MG/2ML
60 INJECTION, POWDER, LYOPHILIZED, FOR SOLUTION INTRAMUSCULAR; INTRAVENOUS EVERY 6 HOURS
Status: DISCONTINUED | OUTPATIENT
Start: 2021-01-01 | End: 2021-01-01

## 2021-01-01 RX ORDER — SODIUM CHLORIDE 0.9 % (FLUSH) 0.9 %
10 SYRINGE (ML) INJECTION EVERY 12 HOURS SCHEDULED
Status: DISCONTINUED | OUTPATIENT
Start: 2021-01-01 | End: 2021-01-01 | Stop reason: HOSPADM

## 2021-01-01 RX ORDER — IPRATROPIUM BROMIDE AND ALBUTEROL SULFATE 2.5; .5 MG/3ML; MG/3ML
3 SOLUTION RESPIRATORY (INHALATION)
Status: DISCONTINUED | OUTPATIENT
Start: 2021-01-01 | End: 2021-01-01

## 2021-01-01 RX ORDER — OXYCODONE AND ACETAMINOPHEN 7.5; 325 MG/1; MG/1
2 TABLET ORAL ONCE AS NEEDED
Status: DISCONTINUED | OUTPATIENT
Start: 2021-01-01 | End: 2021-01-01 | Stop reason: HOSPADM

## 2021-01-01 RX ORDER — METHYLPREDNISOLONE SODIUM SUCCINATE 125 MG/2ML
60 INJECTION, POWDER, LYOPHILIZED, FOR SOLUTION INTRAMUSCULAR; INTRAVENOUS EVERY 12 HOURS
Status: DISCONTINUED | OUTPATIENT
Start: 2021-01-01 | End: 2021-01-01 | Stop reason: HOSPADM

## 2021-01-01 RX ORDER — ASPIRIN 81 MG/1
81 TABLET ORAL DAILY
Status: DISCONTINUED | OUTPATIENT
Start: 2021-01-01 | End: 2021-01-01 | Stop reason: HOSPADM

## 2021-01-01 RX ORDER — ASPIRIN 81 MG/1
81 TABLET, CHEWABLE ORAL DAILY
COMMUNITY

## 2021-01-01 RX ORDER — SODIUM CHLORIDE 9 MG/ML
75 INJECTION, SOLUTION INTRAVENOUS CONTINUOUS
Status: DISCONTINUED | OUTPATIENT
Start: 2021-01-01 | End: 2021-01-01

## 2021-01-01 RX ORDER — DIPHENOXYLATE HYDROCHLORIDE AND ATROPINE SULFATE 2.5; .025 MG/1; MG/1
2 TABLET ORAL ONCE
Status: COMPLETED | OUTPATIENT
Start: 2021-01-01 | End: 2021-01-01

## 2021-01-01 RX ORDER — SCOLOPAMINE TRANSDERMAL SYSTEM 1 MG/1
1 PATCH, EXTENDED RELEASE TRANSDERMAL
Status: DISCONTINUED | OUTPATIENT
Start: 2021-01-01 | End: 2021-07-11 | Stop reason: HOSPADM

## 2021-01-01 RX ORDER — NICOTINE 21 MG/24HR
1 PATCH, TRANSDERMAL 24 HOURS TRANSDERMAL EVERY 24 HOURS
Status: DISCONTINUED | OUTPATIENT
Start: 2021-01-01 | End: 2021-01-01 | Stop reason: HOSPADM

## 2021-01-01 RX ORDER — FAMOTIDINE 20 MG/1
20 TABLET, FILM COATED ORAL DAILY
Start: 2021-01-01

## 2021-01-01 RX ORDER — LABETALOL HYDROCHLORIDE 5 MG/ML
5 INJECTION, SOLUTION INTRAVENOUS
Status: DISCONTINUED | OUTPATIENT
Start: 2021-01-01 | End: 2021-01-01 | Stop reason: HOSPADM

## 2021-01-01 RX ORDER — ONDANSETRON 2 MG/ML
4 INJECTION INTRAMUSCULAR; INTRAVENOUS AS NEEDED
Status: DISCONTINUED | OUTPATIENT
Start: 2021-01-01 | End: 2021-01-01 | Stop reason: HOSPADM

## 2021-01-01 RX ORDER — IPRATROPIUM BROMIDE AND ALBUTEROL SULFATE 2.5; .5 MG/3ML; MG/3ML
3 SOLUTION RESPIRATORY (INHALATION) EVERY 6 HOURS PRN
Status: DISCONTINUED | OUTPATIENT
Start: 2021-01-01 | End: 2021-01-01 | Stop reason: HOSPADM

## 2021-01-01 RX ORDER — ATROPINE SULFATE 10 MG/ML
1 SOLUTION/ DROPS OPHTHALMIC DAILY
COMMUNITY

## 2021-01-01 RX ORDER — ACETAMINOPHEN 500 MG
1000 TABLET ORAL
Status: COMPLETED | OUTPATIENT
Start: 2021-01-01 | End: 2021-01-01

## 2021-01-01 RX ORDER — LORAZEPAM 2 MG/ML
1 INJECTION INTRAMUSCULAR EVERY 4 HOURS PRN
Status: DISCONTINUED | OUTPATIENT
Start: 2021-01-01 | End: 2021-07-11 | Stop reason: HOSPADM

## 2021-01-01 RX ORDER — TIMOLOL MALEATE 5 MG/ML
1 SOLUTION/ DROPS OPHTHALMIC EVERY MORNING
Status: DISCONTINUED | OUTPATIENT
Start: 2021-01-01 | End: 2021-01-01

## 2021-01-01 RX ORDER — POLYETHYLENE GLYCOL 3350, SODIUM SULFATE ANHYDROUS, SODIUM BICARBONATE, SODIUM CHLORIDE, POTASSIUM CHLORIDE 236; 22.74; 6.74; 5.86; 2.97 G/4L; G/4L; G/4L; G/4L; G/4L
4 POWDER, FOR SOLUTION ORAL ONCE
Qty: 4000 ML | Refills: 0 | Status: SHIPPED | OUTPATIENT
Start: 2021-01-01 | End: 2021-01-01

## 2021-01-01 RX ORDER — ACETAMINOPHEN 325 MG/1
650 TABLET ORAL EVERY 4 HOURS PRN
COMMUNITY

## 2021-01-01 RX ORDER — TRAMADOL HYDROCHLORIDE 50 MG/1
50 TABLET ORAL EVERY 8 HOURS PRN
Qty: 12 TABLET | Refills: 0 | OUTPATIENT
Start: 2021-01-01 | End: 2021-01-01

## 2021-01-01 RX ORDER — CARVEDILOL 6.25 MG/1
6.25 TABLET ORAL 2 TIMES DAILY WITH MEALS
Status: DISCONTINUED | OUTPATIENT
Start: 2021-01-01 | End: 2021-01-01 | Stop reason: HOSPADM

## 2021-01-01 RX ORDER — ALBUMIN (HUMAN) 12.5 G/50ML
12.5 SOLUTION INTRAVENOUS AS NEEDED
Status: DISCONTINUED | OUTPATIENT
Start: 2021-01-01 | End: 2021-01-01 | Stop reason: HOSPADM

## 2021-01-01 RX ORDER — MORPHINE SULFATE 1 MG/ML
1 INJECTION, SOLUTION INTRAVENOUS CONTINUOUS
Status: DISCONTINUED | OUTPATIENT
Start: 2021-01-01 | End: 2021-07-11 | Stop reason: HOSPADM

## 2021-01-01 RX ORDER — OXYCODONE AND ACETAMINOPHEN 7.5; 325 MG/1; MG/1
1 TABLET ORAL EVERY 6 HOURS PRN
Qty: 12 TABLET | Refills: 0 | Status: ON HOLD | OUTPATIENT
Start: 2021-01-01 | End: 2021-01-01 | Stop reason: SDUPTHER

## 2021-01-01 RX ORDER — HEPARIN SODIUM 5000 [USP'U]/ML
5000 INJECTION, SOLUTION INTRAVENOUS; SUBCUTANEOUS EVERY 12 HOURS SCHEDULED
Status: DISCONTINUED | OUTPATIENT
Start: 2021-01-01 | End: 2021-01-01

## 2021-01-01 RX ORDER — SODIUM CHLORIDE 9 MG/ML
500 INJECTION, SOLUTION INTRAVENOUS CONTINUOUS
Status: DISPENSED | OUTPATIENT
Start: 2021-01-01 | End: 2021-01-01

## 2021-01-01 RX ORDER — BRIMONIDINE TARTRATE 2 MG/ML
1 SOLUTION/ DROPS OPHTHALMIC 3 TIMES DAILY
Status: DISCONTINUED | OUTPATIENT
Start: 2021-01-01 | End: 2021-01-01 | Stop reason: HOSPADM

## 2021-01-01 RX ORDER — PREGABALIN 50 MG/1
50 CAPSULE ORAL 2 TIMES DAILY
COMMUNITY
End: 2021-01-01 | Stop reason: HOSPADM

## 2021-01-01 RX ORDER — SODIUM CHLORIDE 0.9 % (FLUSH) 0.9 %
3 SYRINGE (ML) INJECTION EVERY 12 HOURS SCHEDULED
Status: DISCONTINUED | OUTPATIENT
Start: 2021-01-01 | End: 2021-01-01 | Stop reason: HOSPADM

## 2021-01-01 RX ORDER — SODIUM CHLORIDE 9 MG/ML
50 INJECTION, SOLUTION INTRAVENOUS CONTINUOUS
Status: DISCONTINUED | OUTPATIENT
Start: 2021-01-01 | End: 2021-01-01

## 2021-01-01 RX ORDER — L.ACID,PARA/B.BIFIDUM/S.THERM 8B CELL
1 CAPSULE ORAL DAILY
Status: DISCONTINUED | OUTPATIENT
Start: 2021-01-01 | End: 2021-01-01

## 2021-01-01 RX ORDER — ASPIRIN 81 MG/1
81 TABLET ORAL DAILY
Status: DISCONTINUED | OUTPATIENT
Start: 2021-01-01 | End: 2021-01-01

## 2021-01-01 RX ORDER — EPHEDRINE SULFATE 50 MG/ML
INJECTION, SOLUTION INTRAVENOUS AS NEEDED
Status: DISCONTINUED | OUTPATIENT
Start: 2021-01-01 | End: 2021-01-01 | Stop reason: SURG

## 2021-01-01 RX ORDER — OXYCODONE AND ACETAMINOPHEN 7.5; 325 MG/1; MG/1
1 TABLET ORAL EVERY 6 HOURS PRN
Qty: 12 TABLET | Refills: 0 | Status: SHIPPED | OUTPATIENT
Start: 2021-01-01 | End: 2021-01-01

## 2021-01-01 RX ORDER — OXYCODONE AND ACETAMINOPHEN 10; 325 MG/1; MG/1
1 TABLET ORAL ONCE AS NEEDED
Status: DISCONTINUED | OUTPATIENT
Start: 2021-01-01 | End: 2021-01-01 | Stop reason: HOSPADM

## 2021-01-01 RX ORDER — BRIMONIDINE TARTRATE 2 MG/ML
1 SOLUTION/ DROPS OPHTHALMIC 2 TIMES DAILY PRN
Status: DISCONTINUED | OUTPATIENT
Start: 2021-01-01 | End: 2021-01-01 | Stop reason: HOSPADM

## 2021-01-01 RX ORDER — ALBUMIN (HUMAN) 12.5 G/50ML
12.5 SOLUTION INTRAVENOUS AS NEEDED
Status: ACTIVE | OUTPATIENT
Start: 2021-01-01 | End: 2021-01-01

## 2021-01-01 RX ORDER — LIDOCAINE HYDROCHLORIDE 10 MG/ML
INJECTION, SOLUTION EPIDURAL; INFILTRATION; INTRACAUDAL; PERINEURAL AS NEEDED
Status: DISCONTINUED | OUTPATIENT
Start: 2021-01-01 | End: 2021-01-01 | Stop reason: HOSPADM

## 2021-01-01 RX ORDER — SODIUM CHLORIDE, SODIUM LACTATE, POTASSIUM CHLORIDE, CALCIUM CHLORIDE 600; 310; 30; 20 MG/100ML; MG/100ML; MG/100ML; MG/100ML
100 INJECTION, SOLUTION INTRAVENOUS CONTINUOUS
Status: DISCONTINUED | OUTPATIENT
Start: 2021-01-01 | End: 2021-01-01

## 2021-01-01 RX ORDER — SACCHAROMYCES BOULARDII 250 MG
250 CAPSULE ORAL DAILY
COMMUNITY
End: 2021-01-01 | Stop reason: HOSPADM

## 2021-01-01 RX ORDER — TRAMADOL HYDROCHLORIDE 50 MG/1
50 TABLET ORAL EVERY 12 HOURS PRN
Status: DISCONTINUED | OUTPATIENT
Start: 2021-01-01 | End: 2021-01-01

## 2021-01-01 RX ORDER — LEVETIRACETAM 10 MG/ML
1000 INJECTION INTRAVASCULAR EVERY 12 HOURS SCHEDULED
Status: DISCONTINUED | OUTPATIENT
Start: 2021-01-01 | End: 2021-01-01

## 2021-01-01 RX ORDER — EPINEPHRINE 0.1 MG/ML
SYRINGE (ML) INJECTION
Status: COMPLETED | OUTPATIENT
Start: 2021-01-01 | End: 2021-01-01

## 2021-01-01 RX ORDER — POTASSIUM CHLORIDE 14.9 MG/ML
20 INJECTION INTRAVENOUS
Status: COMPLETED | OUTPATIENT
Start: 2021-01-01 | End: 2021-01-01

## 2021-01-01 RX ORDER — SERTRALINE HYDROCHLORIDE 25 MG/1
25 TABLET, FILM COATED ORAL DAILY
Start: 2021-01-01

## 2021-01-01 RX ORDER — PANTOPRAZOLE SODIUM 40 MG/10ML
80 INJECTION, POWDER, LYOPHILIZED, FOR SOLUTION INTRAVENOUS ONCE
Status: COMPLETED | OUTPATIENT
Start: 2021-01-01 | End: 2021-01-01

## 2021-01-01 RX ORDER — HYDROMORPHONE HYDROCHLORIDE 1 MG/ML
0.25 INJECTION, SOLUTION INTRAMUSCULAR; INTRAVENOUS; SUBCUTANEOUS
Status: DISCONTINUED | OUTPATIENT
Start: 2021-01-01 | End: 2021-01-01 | Stop reason: HOSPADM

## 2021-01-01 RX ORDER — METOCLOPRAMIDE 5 MG/1
5 TABLET ORAL
Status: ON HOLD | COMMUNITY
End: 2021-01-01

## 2021-01-01 RX ORDER — SEVELAMER CARBONATE 800 MG/1
2400 TABLET, FILM COATED ORAL
Status: ON HOLD | COMMUNITY
End: 2021-01-01

## 2021-01-01 RX ORDER — MELATONIN
2000 DAILY
COMMUNITY

## 2021-01-01 RX ORDER — FUROSEMIDE 10 MG/ML
80 INJECTION INTRAMUSCULAR; INTRAVENOUS ONCE
Status: COMPLETED | OUTPATIENT
Start: 2021-01-01 | End: 2021-01-01

## 2021-01-01 RX ORDER — LEVOTHYROXINE SODIUM 0.05 MG/1
50 TABLET ORAL
Status: DISCONTINUED | OUTPATIENT
Start: 2021-01-01 | End: 2021-01-01

## 2021-01-01 RX ORDER — SODIUM CHLORIDE, SODIUM LACTATE, POTASSIUM CHLORIDE, CALCIUM CHLORIDE 600; 310; 30; 20 MG/100ML; MG/100ML; MG/100ML; MG/100ML
50 INJECTION, SOLUTION INTRAVENOUS CONTINUOUS
Status: DISCONTINUED | OUTPATIENT
Start: 2021-01-01 | End: 2021-01-01

## 2021-01-01 RX ORDER — METOCLOPRAMIDE 5 MG/1
5 TABLET ORAL
Status: DISCONTINUED | OUTPATIENT
Start: 2021-01-01 | End: 2021-01-01 | Stop reason: HOSPADM

## 2021-01-01 RX ORDER — L.ACID,PARA/B.BIFIDUM/S.THERM 8B CELL
1 CAPSULE ORAL DAILY
Qty: 30 CAPSULE | Refills: 2 | Status: SHIPPED | OUTPATIENT
Start: 2021-01-01

## 2021-01-01 RX ORDER — CARVEDILOL 6.25 MG/1
6.25 TABLET ORAL 2 TIMES DAILY WITH MEALS
COMMUNITY

## 2021-01-01 RX ORDER — IPRATROPIUM BROMIDE AND ALBUTEROL SULFATE 2.5; .5 MG/3ML; MG/3ML
3 SOLUTION RESPIRATORY (INHALATION)
Status: DISCONTINUED | OUTPATIENT
Start: 2021-01-01 | End: 2021-01-01 | Stop reason: HOSPADM

## 2021-01-01 RX ORDER — MIDAZOLAM HYDROCHLORIDE 1 MG/ML
2 INJECTION INTRAMUSCULAR; INTRAVENOUS ONCE
Status: COMPLETED | OUTPATIENT
Start: 2021-01-01 | End: 2021-01-01

## 2021-01-01 RX ORDER — FERRIC CITRATE 210 MG/1
1 TABLET, COATED ORAL DAILY
Status: ON HOLD | COMMUNITY
End: 2021-01-01 | Stop reason: ALTCHOICE

## 2021-01-01 RX ORDER — LORAZEPAM 2 MG/ML
1 INJECTION INTRAMUSCULAR ONCE
Status: DISCONTINUED | OUTPATIENT
Start: 2021-01-01 | End: 2021-01-01 | Stop reason: HOSPADM

## 2021-01-01 RX ORDER — SODIUM CHLORIDE, SODIUM LACTATE, POTASSIUM CHLORIDE, CALCIUM CHLORIDE 600; 310; 30; 20 MG/100ML; MG/100ML; MG/100ML; MG/100ML
1000 INJECTION, SOLUTION INTRAVENOUS CONTINUOUS
Status: DISCONTINUED | OUTPATIENT
Start: 2021-01-01 | End: 2021-01-01

## 2021-01-01 RX ORDER — MIDODRINE HYDROCHLORIDE 2.5 MG/1
5 TABLET ORAL
Status: DISCONTINUED | OUTPATIENT
Start: 2021-01-01 | End: 2021-01-01

## 2021-01-01 RX ORDER — LATANOPROST 50 UG/ML
1 SOLUTION/ DROPS OPHTHALMIC NIGHTLY
Status: DISCONTINUED | OUTPATIENT
Start: 2021-01-01 | End: 2021-01-01 | Stop reason: HOSPADM

## 2021-01-01 RX ORDER — NALOXONE HCL 0.4 MG/ML
0.04 VIAL (ML) INJECTION AS NEEDED
Status: DISCONTINUED | OUTPATIENT
Start: 2021-01-01 | End: 2021-01-01 | Stop reason: HOSPADM

## 2021-01-01 RX ORDER — SUCROFERRIC OXYHYDROXIDE 500 MG/1
1 TABLET, CHEWABLE ORAL
COMMUNITY
End: 2021-01-01 | Stop reason: HOSPADM

## 2021-01-01 RX ORDER — CLINDAMYCIN PHOSPHATE 900 MG/50ML
900 INJECTION INTRAVENOUS ONCE
Status: CANCELLED | OUTPATIENT
Start: 2021-01-01 | End: 2021-01-01

## 2021-01-01 RX ORDER — FAMOTIDINE 20 MG/1
20 TABLET, FILM COATED ORAL 2 TIMES DAILY
Status: ON HOLD | COMMUNITY
End: 2021-01-01

## 2021-01-01 RX ORDER — LOSARTAN POTASSIUM 50 MG/1
50 TABLET ORAL DAILY
Status: ON HOLD
Start: 2021-01-01 | End: 2021-01-01

## 2021-01-01 RX ORDER — LOSARTAN POTASSIUM 50 MG/1
50 TABLET ORAL DAILY
Status: DISCONTINUED | OUTPATIENT
Start: 2021-01-01 | End: 2021-01-01 | Stop reason: HOSPADM

## 2021-01-01 RX ORDER — DEXTROSE MONOHYDRATE 25 G/50ML
25 INJECTION, SOLUTION INTRAVENOUS
Status: DISCONTINUED | OUTPATIENT
Start: 2021-01-01 | End: 2021-01-01

## 2021-01-01 RX ORDER — LANOLIN ALCOHOL/MO/W.PET/CERES
6 CREAM (GRAM) TOPICAL NIGHTLY PRN
Status: DISCONTINUED | OUTPATIENT
Start: 2021-01-01 | End: 2021-07-11 | Stop reason: HOSPADM

## 2021-01-01 RX ORDER — OLANZAPINE 10 MG/1
10 INJECTION, POWDER, LYOPHILIZED, FOR SOLUTION INTRAMUSCULAR ONCE
Status: COMPLETED | OUTPATIENT
Start: 2021-01-01 | End: 2021-01-01

## 2021-01-01 RX ORDER — OXYCODONE HYDROCHLORIDE AND ACETAMINOPHEN 5; 325 MG/1; MG/1
1 TABLET ORAL EVERY 6 HOURS PRN
Status: DISCONTINUED | OUTPATIENT
Start: 2021-01-01 | End: 2021-01-01 | Stop reason: HOSPADM

## 2021-01-01 RX ORDER — PANTOPRAZOLE SODIUM 40 MG/1
40 TABLET, DELAYED RELEASE ORAL DAILY
Qty: 30 TABLET | Refills: 1 | Status: SHIPPED | OUTPATIENT
Start: 2021-01-01 | End: 2021-01-01 | Stop reason: HOSPADM

## 2021-01-01 RX ORDER — MORPHINE SULFATE 2 MG/ML
2 INJECTION, SOLUTION INTRAMUSCULAR; INTRAVENOUS
Status: DISCONTINUED | OUTPATIENT
Start: 2021-01-01 | End: 2021-07-11 | Stop reason: HOSPADM

## 2021-01-01 RX ORDER — NICOTINE 21 MG/24HR
1 PATCH, TRANSDERMAL 24 HOURS TRANSDERMAL
Status: DISCONTINUED | OUTPATIENT
Start: 2021-01-01 | End: 2021-01-01 | Stop reason: HOSPADM

## 2021-01-01 RX ORDER — PREGABALIN 50 MG/1
50 CAPSULE ORAL 2 TIMES DAILY
Status: DISCONTINUED | OUTPATIENT
Start: 2021-01-01 | End: 2021-01-01 | Stop reason: HOSPADM

## 2021-01-01 RX ORDER — METHYLPREDNISOLONE SODIUM SUCCINATE 40 MG/ML
60 INJECTION, POWDER, LYOPHILIZED, FOR SOLUTION INTRAMUSCULAR; INTRAVENOUS EVERY 8 HOURS
Status: DISCONTINUED | OUTPATIENT
Start: 2021-01-01 | End: 2021-01-01

## 2021-01-01 RX ORDER — CALCIUM CHLORIDE 100 MG/ML
INJECTION INTRAVENOUS; INTRAVENTRICULAR
Status: COMPLETED | OUTPATIENT
Start: 2021-01-01 | End: 2021-01-01

## 2021-01-01 RX ORDER — FAMOTIDINE 20 MG/1
20 TABLET, FILM COATED ORAL
Status: DISCONTINUED | OUTPATIENT
Start: 2021-01-01 | End: 2021-01-01 | Stop reason: HOSPADM

## 2021-01-01 RX ORDER — LEVETIRACETAM 10 MG/ML
1000 INJECTION INTRAVASCULAR ONCE
Status: DISCONTINUED | OUTPATIENT
Start: 2021-01-01 | End: 2021-01-01

## 2021-01-01 RX ORDER — ONDANSETRON 2 MG/ML
4 INJECTION INTRAMUSCULAR; INTRAVENOUS
Status: CANCELLED | OUTPATIENT
Start: 2021-01-01

## 2021-01-01 RX ORDER — NICOTINE 21 MG/24HR
1 PATCH, TRANSDERMAL 24 HOURS TRANSDERMAL
Status: DISCONTINUED | OUTPATIENT
Start: 2021-01-01 | End: 2021-01-01

## 2021-01-01 RX ORDER — ONDANSETRON 4 MG/1
4 TABLET, FILM COATED ORAL EVERY 6 HOURS PRN
Qty: 24 TABLET | Refills: 2 | Status: SHIPPED | OUTPATIENT
Start: 2021-01-01

## 2021-01-01 RX ORDER — FUROSEMIDE 10 MG/ML
100 INJECTION INTRAMUSCULAR; INTRAVENOUS ONCE
Status: COMPLETED | OUTPATIENT
Start: 2021-01-01 | End: 2021-01-01

## 2021-01-01 RX ORDER — TRAMADOL HYDROCHLORIDE 50 MG/1
50 TABLET ORAL EVERY 6 HOURS PRN
COMMUNITY
End: 2021-01-01 | Stop reason: HOSPADM

## 2021-01-01 RX ADMIN — SEVELAMER CARBONATE 800 MG: 800 TABLET, FILM COATED ORAL at 09:02

## 2021-01-01 RX ADMIN — VANCOMYCIN HYDROCHLORIDE 1000 MG: 1 INJECTION, SOLUTION INTRAVENOUS at 19:30

## 2021-01-01 RX ADMIN — DORZOLAMIDE HYDROCHLORIDE 1 DROP: 20 SOLUTION/ DROPS OPHTHALMIC at 09:24

## 2021-01-01 RX ADMIN — PRAVASTATIN SODIUM 10 MG: 20 TABLET ORAL at 20:57

## 2021-01-01 RX ADMIN — MEROPENEM 500 MG: 500 INJECTION, POWDER, FOR SOLUTION INTRAVENOUS at 12:28

## 2021-01-01 RX ADMIN — NALOXONE HYDROCHLORIDE 0.4 MG: 0.4 INJECTION, SOLUTION INTRAMUSCULAR; INTRAVENOUS; SUBCUTANEOUS at 16:43

## 2021-01-01 RX ADMIN — CHOLESTYRAMINE 1 PACKET: 4 POWDER, FOR SUSPENSION ORAL at 15:58

## 2021-01-01 RX ADMIN — CARVEDILOL 6.25 MG: 6.25 TABLET, FILM COATED ORAL at 17:41

## 2021-01-01 RX ADMIN — NICOTINE 1 PATCH: 21 PATCH, EXTENDED RELEASE TRANSDERMAL at 22:21

## 2021-01-01 RX ADMIN — SEVELAMER CARBONATE 2400 MG: 800 TABLET, FILM COATED ORAL at 08:12

## 2021-01-01 RX ADMIN — INSULIN HUMAN 2 UNITS: 100 INJECTION, SOLUTION PARENTERAL at 17:32

## 2021-01-01 RX ADMIN — ASPIRIN 81 MG: 81 TABLET, COATED ORAL at 09:32

## 2021-01-01 RX ADMIN — LORAZEPAM 2 MG: 2 INJECTION INTRAMUSCULAR; INTRAVENOUS at 23:36

## 2021-01-01 RX ADMIN — IPRATROPIUM BROMIDE AND ALBUTEROL SULFATE 3 ML: 2.5; .5 SOLUTION RESPIRATORY (INHALATION) at 06:29

## 2021-01-01 RX ADMIN — FLUTICASONE PROPIONATE 2 SPRAY: 50 SPRAY, METERED NASAL at 09:14

## 2021-01-01 RX ADMIN — SODIUM CHLORIDE 8 MG/HR: 900 INJECTION INTRAVENOUS at 00:53

## 2021-01-01 RX ADMIN — CEFEPIME HYDROCHLORIDE 1 G: 1 INJECTION, POWDER, FOR SOLUTION INTRAMUSCULAR; INTRAVENOUS at 18:37

## 2021-01-01 RX ADMIN — VANCOMYCIN HYDROCHLORIDE 125 MG: KIT at 06:12

## 2021-01-01 RX ADMIN — Medication 2000 UNITS: at 08:23

## 2021-01-01 RX ADMIN — HEPARIN SODIUM 5000 UNITS: 5000 INJECTION INTRAVENOUS; SUBCUTANEOUS at 20:30

## 2021-01-01 RX ADMIN — Medication 1 CAPSULE: at 08:20

## 2021-01-01 RX ADMIN — LEVOTHYROXINE SODIUM 25 MCG: 25 TABLET ORAL at 05:59

## 2021-01-01 RX ADMIN — Medication 500 MG: at 08:28

## 2021-01-01 RX ADMIN — ALBUTEROL SULFATE 3 PUFF: 90 AEROSOL, METERED RESPIRATORY (INHALATION) at 10:34

## 2021-01-01 RX ADMIN — SODIUM CHLORIDE, PRESERVATIVE FREE 10 ML: 5 INJECTION INTRAVENOUS at 20:56

## 2021-01-01 RX ADMIN — TAZOBACTAM SODIUM AND PIPERACILLIN SODIUM 3.38 G: 375; 3 INJECTION, SOLUTION INTRAVENOUS at 14:04

## 2021-01-01 RX ADMIN — FLUTICASONE PROPIONATE 2 SPRAY: 50 SPRAY, METERED NASAL at 08:29

## 2021-01-01 RX ADMIN — SODIUM CHLORIDE 8 MG/HR: 900 INJECTION INTRAVENOUS at 10:11

## 2021-01-01 RX ADMIN — BRIMONIDINE TARTRATE 1 DROP: 2 SOLUTION OPHTHALMIC at 08:34

## 2021-01-01 RX ADMIN — BRIMONIDINE TARTRATE 1 DROP: 2 SOLUTION OPHTHALMIC at 08:30

## 2021-01-01 RX ADMIN — DORZOLAMIDE HYDROCHLORIDE 1 DROP: 20 SOLUTION/ DROPS OPHTHALMIC at 21:17

## 2021-01-01 RX ADMIN — CARVEDILOL 6.25 MG: 6.25 TABLET, FILM COATED ORAL at 17:31

## 2021-01-01 RX ADMIN — SODIUM CHLORIDE, PRESERVATIVE FREE 10 ML: 5 INJECTION INTRAVENOUS at 09:22

## 2021-01-01 RX ADMIN — TIMOLOL MALEATE 1 DROP: 5 SOLUTION OPHTHALMIC at 08:29

## 2021-01-01 RX ADMIN — CARVEDILOL 6.25 MG: 6.25 TABLET, FILM COATED ORAL at 21:37

## 2021-01-01 RX ADMIN — PRAVASTATIN SODIUM 10 MG: 20 TABLET ORAL at 20:40

## 2021-01-01 RX ADMIN — MORPHINE SULFATE 2 MG: 2 INJECTION, SOLUTION INTRAMUSCULAR; INTRAVENOUS at 10:20

## 2021-01-01 RX ADMIN — VANCOMYCIN HYDROCHLORIDE 125 MG: KIT at 14:25

## 2021-01-01 RX ADMIN — SODIUM CHLORIDE, PRESERVATIVE FREE 10 ML: 5 INJECTION INTRAVENOUS at 21:51

## 2021-01-01 RX ADMIN — BRIMONIDINE TARTRATE 1 DROP: 2 SOLUTION OPHTHALMIC at 20:40

## 2021-01-01 RX ADMIN — INSULIN LISPRO 2 UNITS: 100 INJECTION, SOLUTION INTRAVENOUS; SUBCUTANEOUS at 18:29

## 2021-01-01 RX ADMIN — SEVELAMER CARBONATE 2400 MG: 800 TABLET, FILM COATED ORAL at 17:16

## 2021-01-01 RX ADMIN — FERRIC CITRATE 840 MG: 210 TABLET, COATED ORAL at 17:41

## 2021-01-01 RX ADMIN — TIMOLOL MALEATE 1 DROP: 5 SOLUTION/ DROPS OPHTHALMIC at 09:23

## 2021-01-01 RX ADMIN — SODIUM CHLORIDE, PRESERVATIVE FREE 10 ML: 5 INJECTION INTRAVENOUS at 21:39

## 2021-01-01 RX ADMIN — BRIMONIDINE TARTRATE 1 DROP: 2 SOLUTION OPHTHALMIC at 10:01

## 2021-01-01 RX ADMIN — VANCOMYCIN HYDROCHLORIDE 125 MG: KIT at 05:56

## 2021-01-01 RX ADMIN — DORZOLAMIDE HYDROCHLORIDE 1 DROP: 20 SOLUTION/ DROPS OPHTHALMIC at 20:24

## 2021-01-01 RX ADMIN — ACETAMINOPHEN 650 MG: 325 TABLET, FILM COATED ORAL at 08:27

## 2021-01-01 RX ADMIN — TIMOLOL MALEATE 1 DROP: 5 SOLUTION/ DROPS OPHTHALMIC at 06:00

## 2021-01-01 RX ADMIN — BRIMONIDINE TARTRATE 1 DROP: 2 SOLUTION OPHTHALMIC at 11:23

## 2021-01-01 RX ADMIN — ASPIRIN 81 MG: 81 TABLET, COATED ORAL at 08:23

## 2021-01-01 RX ADMIN — HEPARIN SODIUM 5000 UNITS: 5000 INJECTION INTRAVENOUS; SUBCUTANEOUS at 09:32

## 2021-01-01 RX ADMIN — FLUTICASONE PROPIONATE 2 SPRAY: 50 SPRAY, METERED NASAL at 22:15

## 2021-01-01 RX ADMIN — FERRIC CITRATE 840 MG: 210 TABLET, COATED ORAL at 17:00

## 2021-01-01 RX ADMIN — INSULIN HUMAN 3 UNITS: 100 INJECTION, SOLUTION PARENTERAL at 17:12

## 2021-01-01 RX ADMIN — BRIMONIDINE TARTRATE 1 DROP: 2 SOLUTION OPHTHALMIC at 21:19

## 2021-01-01 RX ADMIN — IPRATROPIUM BROMIDE AND ALBUTEROL SULFATE 3 ML: 2.5; .5 SOLUTION RESPIRATORY (INHALATION) at 10:44

## 2021-01-01 RX ADMIN — TAZOBACTAM SODIUM AND PIPERACILLIN SODIUM 3.38 G: 375; 3 INJECTION, SOLUTION INTRAVENOUS at 21:04

## 2021-01-01 RX ADMIN — Medication 1 CAPSULE: at 09:01

## 2021-01-01 RX ADMIN — METOCLOPRAMIDE 5 MG: 5 TABLET ORAL at 17:26

## 2021-01-01 RX ADMIN — POTASSIUM CHLORIDE 20 MEQ: 14.9 INJECTION, SOLUTION INTRAVENOUS at 06:33

## 2021-01-01 RX ADMIN — SODIUM CHLORIDE 8 MG/HR: 900 INJECTION INTRAVENOUS at 20:01

## 2021-01-01 RX ADMIN — OLANZAPINE 10 MG: 10 INJECTION, POWDER, FOR SOLUTION INTRAMUSCULAR at 18:12

## 2021-01-01 RX ADMIN — SODIUM CHLORIDE, PRESERVATIVE FREE 10 ML: 5 INJECTION INTRAVENOUS at 08:35

## 2021-01-01 RX ADMIN — Medication 2000 UNITS: at 08:11

## 2021-01-01 RX ADMIN — BRIMONIDINE TARTRATE 1 DROP: 2 SOLUTION OPHTHALMIC at 08:16

## 2021-01-01 RX ADMIN — FERRIC CITRATE 840 MG: 210 TABLET, COATED ORAL at 17:29

## 2021-01-01 RX ADMIN — SODIUM CHLORIDE, POTASSIUM CHLORIDE, SODIUM LACTATE AND CALCIUM CHLORIDE 50 ML/HR: 600; 310; 30; 20 INJECTION, SOLUTION INTRAVENOUS at 03:08

## 2021-01-01 RX ADMIN — MORPHINE SULFATE 1 MG/HR: 10 INJECTION, SOLUTION INTRAMUSCULAR; INTRAVENOUS at 15:26

## 2021-01-01 RX ADMIN — PRAVASTATIN SODIUM 10 MG: 20 TABLET ORAL at 21:28

## 2021-01-01 RX ADMIN — ACETAMINOPHEN 650 MG: 325 TABLET, FILM COATED ORAL at 20:32

## 2021-01-01 RX ADMIN — FERRIC CITRATE 840 MG: 210 TABLET, COATED ORAL at 08:29

## 2021-01-01 RX ADMIN — IPRATROPIUM BROMIDE AND ALBUTEROL SULFATE 3 ML: 2.5; .5 SOLUTION RESPIRATORY (INHALATION) at 10:14

## 2021-01-01 RX ADMIN — DORZOLAMIDE HYDROCHLORIDE 1 DROP: 20 SOLUTION/ DROPS OPHTHALMIC at 21:51

## 2021-01-01 RX ADMIN — SODIUM CHLORIDE, PRESERVATIVE FREE 10 ML: 5 INJECTION INTRAVENOUS at 20:58

## 2021-01-01 RX ADMIN — TAZOBACTAM SODIUM AND PIPERACILLIN SODIUM 3.38 G: 375; 3 INJECTION, SOLUTION INTRAVENOUS at 09:06

## 2021-01-01 RX ADMIN — Medication 2000 UNITS: at 08:20

## 2021-01-01 RX ADMIN — ACETAMINOPHEN 650 MG: 325 TABLET, FILM COATED ORAL at 17:08

## 2021-01-01 RX ADMIN — PREGABALIN 50 MG: 50 CAPSULE ORAL at 09:02

## 2021-01-01 RX ADMIN — DORZOLAMIDE HYDROCHLORIDE 1 DROP: 20 SOLUTION/ DROPS OPHTHALMIC at 14:04

## 2021-01-01 RX ADMIN — ACETAMINOPHEN 650 MG: 325 TABLET, FILM COATED ORAL at 00:45

## 2021-01-01 RX ADMIN — EPOETIN ALFA-EPBX 10000 UNITS: 10000 INJECTION, SOLUTION INTRAVENOUS; SUBCUTANEOUS at 08:23

## 2021-01-01 RX ADMIN — HEPARIN SODIUM 5000 UNITS: 5000 INJECTION INTRAVENOUS; SUBCUTANEOUS at 09:01

## 2021-01-01 RX ADMIN — PRAVASTATIN SODIUM 10 MG: 20 TABLET ORAL at 20:05

## 2021-01-01 RX ADMIN — MEROPENEM 500 MG: 500 INJECTION, POWDER, FOR SOLUTION INTRAVENOUS at 13:53

## 2021-01-01 RX ADMIN — DORZOLAMIDE HYDROCHLORIDE 1 DROP: 20 SOLUTION/ DROPS OPHTHALMIC at 21:57

## 2021-01-01 RX ADMIN — SODIUM CHLORIDE, PRESERVATIVE FREE 10 ML: 5 INJECTION INTRAVENOUS at 21:13

## 2021-01-01 RX ADMIN — FERRIC CITRATE 840 MG: 210 TABLET, COATED ORAL at 13:38

## 2021-01-01 RX ADMIN — CARVEDILOL 6.25 MG: 6.25 TABLET, FILM COATED ORAL at 08:29

## 2021-01-01 RX ADMIN — VANCOMYCIN HYDROCHLORIDE 125 MG: KIT at 17:41

## 2021-01-01 RX ADMIN — VANCOMYCIN HYDROCHLORIDE 125 MG: KIT at 12:04

## 2021-01-01 RX ADMIN — SERTRALINE HYDROCHLORIDE 25 MG: 50 TABLET, FILM COATED ORAL at 08:41

## 2021-01-01 RX ADMIN — FLUTICASONE PROPIONATE 2 SPRAY: 50 SPRAY, METERED NASAL at 20:02

## 2021-01-01 RX ADMIN — SEVELAMER CARBONATE 2400 MG: 800 TABLET, FILM COATED ORAL at 12:29

## 2021-01-01 RX ADMIN — Medication 1 PATCH: at 06:19

## 2021-01-01 RX ADMIN — FLUTICASONE PROPIONATE 2 SPRAY: 50 SPRAY, METERED NASAL at 12:32

## 2021-01-01 RX ADMIN — INSULIN LISPRO 2 UNITS: 100 INJECTION, SOLUTION INTRAVENOUS; SUBCUTANEOUS at 17:25

## 2021-01-01 RX ADMIN — METHYLPREDNISOLONE SODIUM SUCCINATE 125 MG: 125 INJECTION, POWDER, FOR SOLUTION INTRAMUSCULAR; INTRAVENOUS at 12:59

## 2021-01-01 RX ADMIN — HEPARIN SODIUM 5000 UNITS: 5000 INJECTION INTRAVENOUS; SUBCUTANEOUS at 08:42

## 2021-01-01 RX ADMIN — Medication 1 CAPSULE: at 08:23

## 2021-01-01 RX ADMIN — NICOTINE 1 PATCH: 21 PATCH, EXTENDED RELEASE TRANSDERMAL at 21:09

## 2021-01-01 RX ADMIN — TAZOBACTAM SODIUM AND PIPERACILLIN SODIUM 3.38 G: 375; 3 INJECTION, SOLUTION INTRAVENOUS at 09:48

## 2021-01-01 RX ADMIN — LIDOCAINE HYDROCHLORIDE 10 ML: 10 INJECTION, SOLUTION INFILTRATION; PERINEURAL at 00:55

## 2021-01-01 RX ADMIN — Medication 1 PACKET: at 16:00

## 2021-01-01 RX ADMIN — VANCOMYCIN HYDROCHLORIDE 125 MG: KIT at 12:43

## 2021-01-01 RX ADMIN — BRIMONIDINE TARTRATE 1 DROP: 2 SOLUTION OPHTHALMIC at 20:54

## 2021-01-01 RX ADMIN — VANCOMYCIN HYDROCHLORIDE 125 MG: KIT at 12:38

## 2021-01-01 RX ADMIN — FERRIC CITRATE 840 MG: 210 TABLET, COATED ORAL at 12:10

## 2021-01-01 RX ADMIN — IPRATROPIUM BROMIDE AND ALBUTEROL SULFATE 3 ML: 2.5; .5 SOLUTION RESPIRATORY (INHALATION) at 19:06

## 2021-01-01 RX ADMIN — SODIUM CHLORIDE, PRESERVATIVE FREE 10 ML: 5 INJECTION INTRAVENOUS at 09:54

## 2021-01-01 RX ADMIN — SODIUM CHLORIDE, PRESERVATIVE FREE 10 ML: 5 INJECTION INTRAVENOUS at 08:28

## 2021-01-01 RX ADMIN — ATROPINE SULFATE 1 DROP: 10 SOLUTION/ DROPS OPHTHALMIC at 11:47

## 2021-01-01 RX ADMIN — HEPARIN SODIUM 5000 UNITS: 5000 INJECTION INTRAVENOUS; SUBCUTANEOUS at 09:52

## 2021-01-01 RX ADMIN — HEPARIN SODIUM 5000 UNITS: 5000 INJECTION INTRAVENOUS; SUBCUTANEOUS at 12:59

## 2021-01-01 RX ADMIN — BRIMONIDINE TARTRATE 1 DROP: 2 SOLUTION OPHTHALMIC at 18:29

## 2021-01-01 RX ADMIN — MORPHINE SULFATE 2 MG: 2 INJECTION, SOLUTION INTRAMUSCULAR; INTRAVENOUS at 16:35

## 2021-01-01 RX ADMIN — INSULIN HUMAN 2 UNITS: 100 INJECTION, SOLUTION PARENTERAL at 11:55

## 2021-01-01 RX ADMIN — METRONIDAZOLE 500 MG: 500 INJECTION, SOLUTION INTRAVENOUS at 00:44

## 2021-01-01 RX ADMIN — Medication 100 MCG: at 09:55

## 2021-01-01 RX ADMIN — VANCOMYCIN HYDROCHLORIDE 125 MG: KIT at 14:46

## 2021-01-01 RX ADMIN — DEXTROSE MONOHYDRATE 75 ML/HR: 100 INJECTION, SOLUTION INTRAVENOUS at 01:07

## 2021-01-01 RX ADMIN — DIPHENOXYLATE HYDROCHLORIDE AND ATROPINE SULFATE 2 TABLET: 2.5; .025 TABLET ORAL at 14:05

## 2021-01-01 RX ADMIN — FLUTICASONE PROPIONATE 2 SPRAY: 50 SPRAY, METERED NASAL at 21:49

## 2021-01-01 RX ADMIN — VANCOMYCIN HYDROCHLORIDE 125 MG: KIT at 15:06

## 2021-01-01 RX ADMIN — FLUTICASONE PROPIONATE 2 SPRAY: 50 SPRAY, METERED NASAL at 08:28

## 2021-01-01 RX ADMIN — CEFTRIAXONE SODIUM 1 G: 1 INJECTION, POWDER, FOR SOLUTION INTRAMUSCULAR; INTRAVENOUS at 14:04

## 2021-01-01 RX ADMIN — Medication 500 MG: at 08:11

## 2021-01-01 RX ADMIN — FERRIC CITRATE 840 MG: 210 TABLET, COATED ORAL at 12:46

## 2021-01-01 RX ADMIN — PREGABALIN 50 MG: 50 CAPSULE ORAL at 12:08

## 2021-01-01 RX ADMIN — FLUTICASONE PROPIONATE 2 SPRAY: 50 SPRAY, METERED NASAL at 20:40

## 2021-01-01 RX ADMIN — BRIMONIDINE TARTRATE 1 DROP: 2 SOLUTION OPHTHALMIC at 21:39

## 2021-01-01 RX ADMIN — DORZOLAMIDE HYDROCHLORIDE 1 DROP: 20 SOLUTION/ DROPS OPHTHALMIC at 21:10

## 2021-01-01 RX ADMIN — Medication 1 PATCH: at 05:59

## 2021-01-01 RX ADMIN — INSULIN LISPRO 2 UNITS: 100 INJECTION, SOLUTION INTRAVENOUS; SUBCUTANEOUS at 13:53

## 2021-01-01 RX ADMIN — LATANOPROST 1 DROP: 50 SOLUTION OPHTHALMIC at 20:40

## 2021-01-01 RX ADMIN — ASPIRIN 81 MG: 81 TABLET, COATED ORAL at 15:07

## 2021-01-01 RX ADMIN — METOCLOPRAMIDE 5 MG: 5 TABLET ORAL at 06:12

## 2021-01-01 RX ADMIN — IPRATROPIUM BROMIDE AND ALBUTEROL SULFATE 3 ML: 2.5; .5 SOLUTION RESPIRATORY (INHALATION) at 11:18

## 2021-01-01 RX ADMIN — TRAMADOL HYDROCHLORIDE 25 MG: 50 TABLET, FILM COATED ORAL at 20:49

## 2021-01-01 RX ADMIN — HYDROMORPHONE HYDROCHLORIDE 1 MG: 1 INJECTION, SOLUTION INTRAMUSCULAR; INTRAVENOUS; SUBCUTANEOUS at 10:05

## 2021-01-01 RX ADMIN — FLUTICASONE PROPIONATE 2 SPRAY: 50 SPRAY, METERED NASAL at 09:22

## 2021-01-01 RX ADMIN — EPINEPHRINE 1 MG: 0.1 INJECTION INTRACARDIAC; INTRAVENOUS at 07:47

## 2021-01-01 RX ADMIN — SODIUM CHLORIDE, PRESERVATIVE FREE 10 ML: 5 INJECTION INTRAVENOUS at 09:00

## 2021-01-01 RX ADMIN — ATROPINE SULFATE 1 DROP: 10 SOLUTION/ DROPS OPHTHALMIC at 12:42

## 2021-01-01 RX ADMIN — VANCOMYCIN HYDROCHLORIDE 125 MG: KIT at 17:29

## 2021-01-01 RX ADMIN — SERTRALINE HYDROCHLORIDE 25 MG: 50 TABLET, FILM COATED ORAL at 12:29

## 2021-01-01 RX ADMIN — NICOTINE 1 PATCH: 21 PATCH, EXTENDED RELEASE TRANSDERMAL at 04:25

## 2021-01-01 RX ADMIN — SODIUM CHLORIDE, PRESERVATIVE FREE 10 ML: 5 INJECTION INTRAVENOUS at 21:56

## 2021-01-01 RX ADMIN — CEFEPIME HYDROCHLORIDE 1 G: 1 INJECTION, POWDER, FOR SOLUTION INTRAMUSCULAR; INTRAVENOUS at 09:23

## 2021-01-01 RX ADMIN — VASOPRESSIN 0.5 ML: 20 INJECTION INTRAVENOUS at 09:55

## 2021-01-01 RX ADMIN — TIMOLOL MALEATE 1 DROP: 5 SOLUTION/ DROPS OPHTHALMIC at 11:20

## 2021-01-01 RX ADMIN — MEROPENEM 500 MG: 500 INJECTION, POWDER, FOR SOLUTION INTRAVENOUS at 13:10

## 2021-01-01 RX ADMIN — BRIMONIDINE TARTRATE 1 DROP: 2 SOLUTION OPHTHALMIC at 09:38

## 2021-01-01 RX ADMIN — TIMOLOL MALEATE 1 DROP: 5 SOLUTION/ DROPS OPHTHALMIC at 08:20

## 2021-01-01 RX ADMIN — VANCOMYCIN HYDROCHLORIDE 125 MG: KIT at 12:30

## 2021-01-01 RX ADMIN — TIMOLOL MALEATE 1 DROP: 5 SOLUTION/ DROPS OPHTHALMIC at 05:32

## 2021-01-01 RX ADMIN — DORZOLAMIDE HYDROCHLORIDE 1 DROP: 20 SOLUTION/ DROPS OPHTHALMIC at 08:11

## 2021-01-01 RX ADMIN — Medication 500 MG: at 21:37

## 2021-01-01 RX ADMIN — SERTRALINE HYDROCHLORIDE 25 MG: 50 TABLET, FILM COATED ORAL at 14:06

## 2021-01-01 RX ADMIN — VANCOMYCIN HYDROCHLORIDE 125 MG: KIT at 18:45

## 2021-01-01 RX ADMIN — CARVEDILOL 6.25 MG: 6.25 TABLET, FILM COATED ORAL at 08:23

## 2021-01-01 RX ADMIN — DORZOLAMIDE HYDROCHLORIDE 1 DROP: 20 SOLUTION/ DROPS OPHTHALMIC at 09:14

## 2021-01-01 RX ADMIN — BRIMONIDINE TARTRATE 1 DROP: 2 SOLUTION OPHTHALMIC at 22:22

## 2021-01-01 RX ADMIN — LORAZEPAM 2 MG: 2 INJECTION INTRAMUSCULAR; INTRAVENOUS at 22:13

## 2021-01-01 RX ADMIN — VANCOMYCIN HYDROCHLORIDE 125 MG: KIT at 05:49

## 2021-01-01 RX ADMIN — METOCLOPRAMIDE 5 MG: 5 TABLET ORAL at 21:28

## 2021-01-01 RX ADMIN — TAZOBACTAM SODIUM AND PIPERACILLIN SODIUM 3.38 G: 375; 3 INJECTION, SOLUTION INTRAVENOUS at 21:16

## 2021-01-01 RX ADMIN — SODIUM CHLORIDE, PRESERVATIVE FREE 10 ML: 5 INJECTION INTRAVENOUS at 21:11

## 2021-01-01 RX ADMIN — METOCLOPRAMIDE 5 MG: 5 TABLET ORAL at 06:32

## 2021-01-01 RX ADMIN — VANCOMYCIN HYDROCHLORIDE 125 MG: KIT at 23:11

## 2021-01-01 RX ADMIN — CARVEDILOL 6.25 MG: 6.25 TABLET, FILM COATED ORAL at 17:30

## 2021-01-01 RX ADMIN — SEVELAMER CARBONATE 2400 MG: 800 TABLET, FILM COATED ORAL at 12:08

## 2021-01-01 RX ADMIN — FLUTICASONE PROPIONATE 2 SPRAY: 50 SPRAY, METERED NASAL at 21:29

## 2021-01-01 RX ADMIN — FERRIC CITRATE 840 MG: 210 TABLET, COATED ORAL at 17:05

## 2021-01-01 RX ADMIN — CALCIUM CHLORIDE 1 G: 100 INJECTION INTRAVENOUS; INTRAVENTRICULAR at 07:46

## 2021-01-01 RX ADMIN — ASPIRIN 81 MG: 81 TABLET, COATED ORAL at 08:28

## 2021-01-01 RX ADMIN — ASPIRIN 81 MG: 81 TABLET, COATED ORAL at 08:11

## 2021-01-01 RX ADMIN — VANCOMYCIN HYDROCHLORIDE 125 MG: KIT at 12:15

## 2021-01-01 RX ADMIN — DORZOLAMIDE HYDROCHLORIDE 1 DROP: 20 SOLUTION/ DROPS OPHTHALMIC at 09:01

## 2021-01-01 RX ADMIN — INSULIN LISPRO 2 UNITS: 100 INJECTION, SOLUTION INTRAVENOUS; SUBCUTANEOUS at 08:53

## 2021-01-01 RX ADMIN — VANCOMYCIN HYDROCHLORIDE 125 MG: KIT at 23:19

## 2021-01-01 RX ADMIN — METOCLOPRAMIDE 5 MG: 5 TABLET ORAL at 05:42

## 2021-01-01 RX ADMIN — SERTRALINE HYDROCHLORIDE 25 MG: 50 TABLET, FILM COATED ORAL at 08:11

## 2021-01-01 RX ADMIN — VANCOMYCIN HYDROCHLORIDE 125 MG: KIT at 00:50

## 2021-01-01 RX ADMIN — OXYCODONE HYDROCHLORIDE AND ACETAMINOPHEN 1 TABLET: 7.5; 325 TABLET ORAL at 10:02

## 2021-01-01 RX ADMIN — SODIUM CHLORIDE, PRESERVATIVE FREE 10 ML: 5 INJECTION INTRAVENOUS at 11:19

## 2021-01-01 RX ADMIN — Medication 500 MG: at 12:15

## 2021-01-01 RX ADMIN — IPRATROPIUM BROMIDE AND ALBUTEROL SULFATE 3 ML: 2.5; .5 SOLUTION RESPIRATORY (INHALATION) at 14:33

## 2021-01-01 RX ADMIN — FERRIC CITRATE 840 MG: 210 TABLET, COATED ORAL at 18:45

## 2021-01-01 RX ADMIN — FERRIC CITRATE 840 MG: 210 TABLET, COATED ORAL at 17:26

## 2021-01-01 RX ADMIN — ENOXAPARIN SODIUM 30 MG: 30 INJECTION SUBCUTANEOUS at 08:32

## 2021-01-01 RX ADMIN — HEPARIN SODIUM 5000 UNITS: 5000 INJECTION INTRAVENOUS; SUBCUTANEOUS at 20:17

## 2021-01-01 RX ADMIN — INSULIN LISPRO 2 UNITS: 100 INJECTION, SOLUTION INTRAVENOUS; SUBCUTANEOUS at 17:29

## 2021-01-01 RX ADMIN — METOCLOPRAMIDE 5 MG: 5 TABLET ORAL at 12:46

## 2021-01-01 RX ADMIN — METOCLOPRAMIDE 5 MG: 5 TABLET ORAL at 14:07

## 2021-01-01 RX ADMIN — SERTRALINE HYDROCHLORIDE 25 MG: 50 TABLET, FILM COATED ORAL at 08:48

## 2021-01-01 RX ADMIN — TAZOBACTAM SODIUM AND PIPERACILLIN SODIUM 3.38 G: 375; 3 INJECTION, SOLUTION INTRAVENOUS at 09:25

## 2021-01-01 RX ADMIN — CARVEDILOL 6.25 MG: 6.25 TABLET, FILM COATED ORAL at 08:48

## 2021-01-01 RX ADMIN — LATANOPROST 1 DROP: 50 SOLUTION OPHTHALMIC at 21:51

## 2021-01-01 RX ADMIN — TIMOLOL MALEATE 1 DROP: 5 SOLUTION/ DROPS OPHTHALMIC at 05:11

## 2021-01-01 RX ADMIN — ENOXAPARIN SODIUM 30 MG: 30 INJECTION SUBCUTANEOUS at 09:24

## 2021-01-01 RX ADMIN — ATROPINE SULFATE 1 DROP: 10 SOLUTION/ DROPS OPHTHALMIC at 11:20

## 2021-01-01 RX ADMIN — PREGABALIN 50 MG: 50 CAPSULE ORAL at 20:49

## 2021-01-01 RX ADMIN — SODIUM CHLORIDE 500 ML: 9 INJECTION, SOLUTION INTRAVENOUS at 08:43

## 2021-01-01 RX ADMIN — ACETAMINOPHEN 650 MG: 325 TABLET, FILM COATED ORAL at 21:06

## 2021-01-01 RX ADMIN — INSULIN HUMAN 2 UNITS: 100 INJECTION, SOLUTION PARENTERAL at 08:28

## 2021-01-01 RX ADMIN — DORZOLAMIDE HYDROCHLORIDE 1 DROP: 20 SOLUTION/ DROPS OPHTHALMIC at 20:02

## 2021-01-01 RX ADMIN — FLUTICASONE PROPIONATE 2 SPRAY: 50 SPRAY, METERED NASAL at 11:47

## 2021-01-01 RX ADMIN — CARVEDILOL 6.25 MG: 6.25 TABLET, FILM COATED ORAL at 08:11

## 2021-01-01 RX ADMIN — IPRATROPIUM BROMIDE AND ALBUTEROL SULFATE 3 ML: 2.5; .5 SOLUTION RESPIRATORY (INHALATION) at 18:58

## 2021-01-01 RX ADMIN — Medication 100 MCG: at 10:05

## 2021-01-01 RX ADMIN — TAZOBACTAM SODIUM AND PIPERACILLIN SODIUM 3.38 G: 375; 3 INJECTION, SOLUTION INTRAVENOUS at 20:27

## 2021-01-01 RX ADMIN — VANCOMYCIN HYDROCHLORIDE 125 MG: KIT at 00:22

## 2021-01-01 RX ADMIN — SODIUM CHLORIDE, PRESERVATIVE FREE 10 ML: 5 INJECTION INTRAVENOUS at 08:29

## 2021-01-01 RX ADMIN — SODIUM CHLORIDE, PRESERVATIVE FREE 10 ML: 5 INJECTION INTRAVENOUS at 21:19

## 2021-01-01 RX ADMIN — NICOTINE 1 PATCH: 21 PATCH, EXTENDED RELEASE TRANSDERMAL at 20:18

## 2021-01-01 RX ADMIN — INSULIN HUMAN 3 UNITS: 100 INJECTION, SOLUTION PARENTERAL at 18:30

## 2021-01-01 RX ADMIN — MIDAZOLAM 2 MG: 1 INJECTION INTRAMUSCULAR; INTRAVENOUS at 06:18

## 2021-01-01 RX ADMIN — NALOXONE HYDROCHLORIDE 1 MG/HR: 0.4 INJECTION, SOLUTION INTRAMUSCULAR; INTRAVENOUS; SUBCUTANEOUS at 17:25

## 2021-01-01 RX ADMIN — ATROPINE SULFATE 1 DROP: 10 SOLUTION/ DROPS OPHTHALMIC at 08:20

## 2021-01-01 RX ADMIN — NICOTINE 1 PATCH: 21 PATCH, EXTENDED RELEASE TRANSDERMAL at 20:57

## 2021-01-01 RX ADMIN — TIMOLOL MALEATE 1 DROP: 5 SOLUTION OPHTHALMIC at 08:36

## 2021-01-01 RX ADMIN — BRIMONIDINE TARTRATE 1 DROP: 2 SOLUTION OPHTHALMIC at 13:11

## 2021-01-01 RX ADMIN — INSULIN LISPRO 2 UNITS: 100 INJECTION, SOLUTION INTRAVENOUS; SUBCUTANEOUS at 08:12

## 2021-01-01 RX ADMIN — SERTRALINE HYDROCHLORIDE 25 MG: 50 TABLET, FILM COATED ORAL at 08:30

## 2021-01-01 RX ADMIN — BRIMONIDINE TARTRATE 1 DROP: 2 SOLUTION OPHTHALMIC at 08:42

## 2021-01-01 RX ADMIN — Medication 20 MCG: at 10:14

## 2021-01-01 RX ADMIN — HEPARIN SODIUM 5000 UNITS: 5000 INJECTION INTRAVENOUS; SUBCUTANEOUS at 21:09

## 2021-01-01 RX ADMIN — ASPIRIN 81 MG: 81 TABLET, COATED ORAL at 14:06

## 2021-01-01 RX ADMIN — HEPARIN SODIUM 5000 UNITS: 5000 INJECTION INTRAVENOUS; SUBCUTANEOUS at 21:38

## 2021-01-01 RX ADMIN — CHOLESTYRAMINE 1 PACKET: 4 POWDER, FOR SUSPENSION ORAL at 21:37

## 2021-01-01 RX ADMIN — SODIUM CHLORIDE, PRESERVATIVE FREE 10 ML: 5 INJECTION INTRAVENOUS at 20:17

## 2021-01-01 RX ADMIN — ATROPINE SULFATE 1 DROP: 10 SOLUTION/ DROPS OPHTHALMIC at 09:24

## 2021-01-01 RX ADMIN — ALBUMIN (HUMAN) 12.5 G: 12.5 INJECTION, SOLUTION INTRAVENOUS at 10:30

## 2021-01-01 RX ADMIN — BRIMONIDINE TARTRATE 1 DROP: 2 SOLUTION OPHTHALMIC at 20:24

## 2021-01-01 RX ADMIN — FERRIC CITRATE 840 MG: 210 TABLET, COATED ORAL at 09:24

## 2021-01-01 RX ADMIN — VANCOMYCIN HYDROCHLORIDE 125 MG: KIT at 19:32

## 2021-01-01 RX ADMIN — SODIUM CHLORIDE, PRESERVATIVE FREE 10 ML: 5 INJECTION INTRAVENOUS at 21:07

## 2021-01-01 RX ADMIN — VANCOMYCIN HYDROCHLORIDE 125 MG: KIT at 17:31

## 2021-01-01 RX ADMIN — BRIMONIDINE TARTRATE 1 DROP: 2 SOLUTION OPHTHALMIC at 16:14

## 2021-01-01 RX ADMIN — INSULIN HUMAN 3 UNITS: 100 INJECTION, SOLUTION PARENTERAL at 11:42

## 2021-01-01 RX ADMIN — FLUTICASONE PROPIONATE 2 SPRAY: 50 SPRAY, METERED NASAL at 09:23

## 2021-01-01 RX ADMIN — SEVELAMER CARBONATE 800 MG: 800 TABLET, FILM COATED ORAL at 17:44

## 2021-01-01 RX ADMIN — FERRIC CITRATE 840 MG: 210 TABLET, COATED ORAL at 07:48

## 2021-01-01 RX ADMIN — DORZOLAMIDE HYDROCHLORIDE 1 DROP: 20 SOLUTION/ DROPS OPHTHALMIC at 08:13

## 2021-01-01 RX ADMIN — DORZOLAMIDE HYDROCHLORIDE 1 DROP: 20 SOLUTION/ DROPS OPHTHALMIC at 08:36

## 2021-01-01 RX ADMIN — INSULIN HUMAN 2 UNITS: 100 INJECTION, SOLUTION PARENTERAL at 17:44

## 2021-01-01 RX ADMIN — ATROPINE SULFATE 1 DROP: 10 SOLUTION/ DROPS OPHTHALMIC at 08:27

## 2021-01-01 RX ADMIN — TAZOBACTAM SODIUM AND PIPERACILLIN SODIUM 3.38 G: 375; 3 INJECTION, SOLUTION INTRAVENOUS at 08:29

## 2021-01-01 RX ADMIN — ATROPINE SULFATE 1 DROP: 10 SOLUTION/ DROPS OPHTHALMIC at 08:29

## 2021-01-01 RX ADMIN — NALOXONE HYDROCHLORIDE 0.4 MG: 0.4 INJECTION, SOLUTION INTRAMUSCULAR; INTRAVENOUS; SUBCUTANEOUS at 16:46

## 2021-01-01 RX ADMIN — TIMOLOL MALEATE 1 DROP: 5 SOLUTION OPHTHALMIC at 09:13

## 2021-01-01 RX ADMIN — Medication 1 CAPSULE: at 09:21

## 2021-01-01 RX ADMIN — ASPIRIN 81 MG: 81 TABLET, COATED ORAL at 08:30

## 2021-01-01 RX ADMIN — PANTOPRAZOLE SODIUM 40 MG: 40 TABLET, DELAYED RELEASE ORAL at 05:50

## 2021-01-01 RX ADMIN — MIDODRINE HYDROCHLORIDE 5 MG: 2.5 TABLET ORAL at 13:33

## 2021-01-01 RX ADMIN — FAMOTIDINE 20 MG: 20 TABLET, FILM COATED ORAL at 09:21

## 2021-01-01 RX ADMIN — BRIMONIDINE TARTRATE 1 DROP: 2 SOLUTION OPHTHALMIC at 21:17

## 2021-01-01 RX ADMIN — SODIUM CHLORIDE, POTASSIUM CHLORIDE, SODIUM LACTATE AND CALCIUM CHLORIDE 500 ML: 600; 310; 30; 20 INJECTION, SOLUTION INTRAVENOUS at 00:30

## 2021-01-01 RX ADMIN — METOCLOPRAMIDE 5 MG: 5 TABLET ORAL at 11:24

## 2021-01-01 RX ADMIN — BRIMONIDINE TARTRATE 1 DROP: 2 SOLUTION OPHTHALMIC at 20:39

## 2021-01-01 RX ADMIN — DIPHENHYDRAMINE HYDROCHLORIDE 25 MG: 50 INJECTION, SOLUTION INTRAMUSCULAR; INTRAVENOUS at 23:54

## 2021-01-01 RX ADMIN — BRIMONIDINE TARTRATE 1 DROP: 2 SOLUTION OPHTHALMIC at 09:01

## 2021-01-01 RX ADMIN — OXYCODONE HYDROCHLORIDE AND ACETAMINOPHEN 1 TABLET: 5; 325 TABLET ORAL at 11:08

## 2021-01-01 RX ADMIN — BRIMONIDINE TARTRATE 1 DROP: 2 SOLUTION OPHTHALMIC at 20:25

## 2021-01-01 RX ADMIN — PANTOPRAZOLE SODIUM 40 MG: 40 TABLET, DELAYED RELEASE ORAL at 06:06

## 2021-01-01 RX ADMIN — FLUTICASONE PROPIONATE 2 SPRAY: 50 SPRAY, METERED NASAL at 08:08

## 2021-01-01 RX ADMIN — NICOTINE 1 PATCH: 21 PATCH, EXTENDED RELEASE TRANSDERMAL at 21:38

## 2021-01-01 RX ADMIN — ATROPINE SULFATE 1 DROP: 10 SOLUTION/ DROPS OPHTHALMIC at 08:47

## 2021-01-01 RX ADMIN — HALOPERIDOL LACTATE 2 MG: 5 INJECTION, SOLUTION INTRAMUSCULAR at 18:38

## 2021-01-01 RX ADMIN — CHOLESTYRAMINE 1 PACKET: 4 POWDER, FOR SUSPENSION ORAL at 20:29

## 2021-01-01 RX ADMIN — SODIUM CHLORIDE, PRESERVATIVE FREE 10 ML: 5 INJECTION INTRAVENOUS at 20:23

## 2021-01-01 RX ADMIN — FAMOTIDINE 20 MG: 20 TABLET, FILM COATED ORAL at 08:47

## 2021-01-01 RX ADMIN — PRAVASTATIN SODIUM 10 MG: 20 TABLET ORAL at 21:41

## 2021-01-01 RX ADMIN — IPRATROPIUM BROMIDE AND ALBUTEROL SULFATE 3 ML: 2.5; .5 SOLUTION RESPIRATORY (INHALATION) at 02:35

## 2021-01-01 RX ADMIN — HEPARIN SODIUM 5000 UNITS: 5000 INJECTION INTRAVENOUS; SUBCUTANEOUS at 08:13

## 2021-01-01 RX ADMIN — ALBUTEROL SULFATE 3 PUFF: 90 AEROSOL, METERED RESPIRATORY (INHALATION) at 10:36

## 2021-01-01 RX ADMIN — FAMOTIDINE 20 MG: 20 TABLET, FILM COATED ORAL at 09:00

## 2021-01-01 RX ADMIN — HEPARIN SODIUM 5000 UNITS: 5000 INJECTION INTRAVENOUS; SUBCUTANEOUS at 08:47

## 2021-01-01 RX ADMIN — TIMOLOL MALEATE 1 DROP: 5 SOLUTION/ DROPS OPHTHALMIC at 08:47

## 2021-01-01 RX ADMIN — MORPHINE SULFATE 0.5 MG: 2 INJECTION, SOLUTION INTRAMUSCULAR; INTRAVENOUS at 21:53

## 2021-01-01 RX ADMIN — ASPIRIN 81 MG: 81 TABLET, COATED ORAL at 08:53

## 2021-01-01 RX ADMIN — FLUTICASONE PROPIONATE 2 SPRAY: 50 SPRAY, METERED NASAL at 20:28

## 2021-01-01 RX ADMIN — INSULIN LISPRO 2 UNITS: 100 INJECTION, SOLUTION INTRAVENOUS; SUBCUTANEOUS at 12:27

## 2021-01-01 RX ADMIN — BRIMONIDINE TARTRATE 1 DROP: 2 SOLUTION OPHTHALMIC at 09:15

## 2021-01-01 RX ADMIN — TIMOLOL MALEATE 1 DROP: 5 SOLUTION/ DROPS OPHTHALMIC at 06:09

## 2021-01-01 RX ADMIN — BRIMONIDINE TARTRATE 1 DROP: 2 SOLUTION OPHTHALMIC at 21:28

## 2021-01-01 RX ADMIN — VANCOMYCIN HYDROCHLORIDE 125 MG: KIT at 23:17

## 2021-01-01 RX ADMIN — SERTRALINE HYDROCHLORIDE 25 MG: 50 TABLET, FILM COATED ORAL at 09:02

## 2021-01-01 RX ADMIN — PANTOPRAZOLE SODIUM 40 MG: 40 TABLET, DELAYED RELEASE ORAL at 06:34

## 2021-01-01 RX ADMIN — VANCOMYCIN HYDROCHLORIDE 125 MG: KIT at 17:53

## 2021-01-01 RX ADMIN — ACETAMINOPHEN 650 MG: 325 TABLET, FILM COATED ORAL at 00:48

## 2021-01-01 RX ADMIN — PRAVASTATIN SODIUM 10 MG: 20 TABLET ORAL at 20:59

## 2021-01-01 RX ADMIN — ACETAMINOPHEN 1000 MG: 500 TABLET, FILM COATED ORAL at 09:01

## 2021-01-01 RX ADMIN — ASPIRIN 81 MG: 81 TABLET, COATED ORAL at 08:20

## 2021-01-01 RX ADMIN — SERTRALINE HYDROCHLORIDE 25 MG: 50 TABLET, FILM COATED ORAL at 09:54

## 2021-01-01 RX ADMIN — VASOPRESSIN 1 ML: 20 INJECTION INTRAVENOUS at 09:59

## 2021-01-01 RX ADMIN — SEVELAMER CARBONATE 2400 MG: 800 TABLET, FILM COATED ORAL at 08:34

## 2021-01-01 RX ADMIN — SERTRALINE HYDROCHLORIDE 25 MG: 50 TABLET, FILM COATED ORAL at 09:24

## 2021-01-01 RX ADMIN — VANCOMYCIN HYDROCHLORIDE 125 MG: KIT at 23:38

## 2021-01-01 RX ADMIN — Medication 500 MG: at 22:21

## 2021-01-01 RX ADMIN — ACETAMINOPHEN 650 MG: 325 TABLET, FILM COATED ORAL at 07:43

## 2021-01-01 RX ADMIN — INSULIN LISPRO 2 UNITS: 100 INJECTION, SOLUTION INTRAVENOUS; SUBCUTANEOUS at 08:21

## 2021-01-01 RX ADMIN — IPRATROPIUM BROMIDE AND ALBUTEROL SULFATE 3 ML: 2.5; .5 SOLUTION RESPIRATORY (INHALATION) at 08:00

## 2021-01-01 RX ADMIN — PRAVASTATIN SODIUM 10 MG: 20 TABLET ORAL at 20:37

## 2021-01-01 RX ADMIN — MORPHINE SULFATE 2 MG: 2 INJECTION, SOLUTION INTRAMUSCULAR; INTRAVENOUS at 14:19

## 2021-01-01 RX ADMIN — METOCLOPRAMIDE 5 MG: 5 TABLET ORAL at 17:22

## 2021-01-01 RX ADMIN — FAMOTIDINE 20 MG: 20 TABLET, FILM COATED ORAL at 17:05

## 2021-01-01 RX ADMIN — CARVEDILOL 6.25 MG: 6.25 TABLET, FILM COATED ORAL at 09:31

## 2021-01-01 RX ADMIN — TIMOLOL MALEATE 1 DROP: 5 SOLUTION/ DROPS OPHTHALMIC at 06:06

## 2021-01-01 RX ADMIN — CARVEDILOL 6.25 MG: 6.25 TABLET, FILM COATED ORAL at 12:42

## 2021-01-01 RX ADMIN — BRIMONIDINE TARTRATE 1 DROP: 2 SOLUTION OPHTHALMIC at 15:05

## 2021-01-01 RX ADMIN — INSULIN HUMAN 3 UNITS: 100 INJECTION, SOLUTION PARENTERAL at 12:04

## 2021-01-01 RX ADMIN — METOCLOPRAMIDE 5 MG: 5 TABLET ORAL at 18:29

## 2021-01-01 RX ADMIN — PREGABALIN 50 MG: 50 CAPSULE ORAL at 21:56

## 2021-01-01 RX ADMIN — FLUTICASONE PROPIONATE 2 SPRAY: 50 SPRAY, METERED NASAL at 20:05

## 2021-01-01 RX ADMIN — TIMOLOL MALEATE 1 DROP: 5 SOLUTION OPHTHALMIC at 09:24

## 2021-01-01 RX ADMIN — IPRATROPIUM BROMIDE AND ALBUTEROL SULFATE 3 ML: 2.5; .5 SOLUTION RESPIRATORY (INHALATION) at 14:32

## 2021-01-01 RX ADMIN — SODIUM CHLORIDE, PRESERVATIVE FREE 10 ML: 5 INJECTION INTRAVENOUS at 08:23

## 2021-01-01 RX ADMIN — VANCOMYCIN HYDROCHLORIDE 125 MG: KIT at 23:14

## 2021-01-01 RX ADMIN — ALBUTEROL SULFATE 3 PUFF: 90 AEROSOL, METERED RESPIRATORY (INHALATION) at 10:31

## 2021-01-01 RX ADMIN — SODIUM CHLORIDE, PRESERVATIVE FREE 10 ML: 5 INJECTION INTRAVENOUS at 15:17

## 2021-01-01 RX ADMIN — HEPARIN SODIUM 5000 UNITS: 5000 INJECTION INTRAVENOUS; SUBCUTANEOUS at 08:28

## 2021-01-01 RX ADMIN — SODIUM CHLORIDE, PRESERVATIVE FREE 10 ML: 5 INJECTION INTRAVENOUS at 08:13

## 2021-01-01 RX ADMIN — BRIMONIDINE TARTRATE 1 DROP: 2 SOLUTION OPHTHALMIC at 16:39

## 2021-01-01 RX ADMIN — METHYLPREDNISOLONE SODIUM SUCCINATE 60 MG: 125 INJECTION, POWDER, FOR SOLUTION INTRAMUSCULAR; INTRAVENOUS at 08:24

## 2021-01-01 RX ADMIN — FUROSEMIDE 100 MG: 10 INJECTION, SOLUTION INTRAMUSCULAR; INTRAVENOUS at 00:53

## 2021-01-01 RX ADMIN — DORZOLAMIDE HYDROCHLORIDE 1 DROP: 20 SOLUTION/ DROPS OPHTHALMIC at 13:11

## 2021-01-01 RX ADMIN — Medication 500 MG: at 09:52

## 2021-01-01 RX ADMIN — EPOETIN ALFA-EPBX 10000 UNITS: 10000 INJECTION, SOLUTION INTRAVENOUS; SUBCUTANEOUS at 08:40

## 2021-01-01 RX ADMIN — DORZOLAMIDE HYDROCHLORIDE 1 DROP: 20 SOLUTION/ DROPS OPHTHALMIC at 20:47

## 2021-01-01 RX ADMIN — BRIMONIDINE TARTRATE 1 DROP: 2 SOLUTION OPHTHALMIC at 21:08

## 2021-01-01 RX ADMIN — Medication 2000 UNITS: at 09:25

## 2021-01-01 RX ADMIN — SODIUM CHLORIDE 8 MG/HR: 900 INJECTION INTRAVENOUS at 06:22

## 2021-01-01 RX ADMIN — SERTRALINE HYDROCHLORIDE 25 MG: 50 TABLET, FILM COATED ORAL at 09:32

## 2021-01-01 RX ADMIN — INSULIN LISPRO 3 UNITS: 100 INJECTION, SOLUTION INTRAVENOUS; SUBCUTANEOUS at 08:07

## 2021-01-01 RX ADMIN — CARVEDILOL 6.25 MG: 6.25 TABLET, FILM COATED ORAL at 18:35

## 2021-01-01 RX ADMIN — FLUTICASONE PROPIONATE 2 SPRAY: 50 SPRAY, METERED NASAL at 09:39

## 2021-01-01 RX ADMIN — IPRATROPIUM BROMIDE AND ALBUTEROL SULFATE 3 ML: 2.5; .5 SOLUTION RESPIRATORY (INHALATION) at 23:11

## 2021-01-01 RX ADMIN — ATROPINE SULFATE 1 DROP: 10 SOLUTION/ DROPS OPHTHALMIC at 09:50

## 2021-01-01 RX ADMIN — FAMOTIDINE 20 MG: 20 TABLET, FILM COATED ORAL at 09:52

## 2021-01-01 RX ADMIN — CARVEDILOL 6.25 MG: 6.25 TABLET, FILM COATED ORAL at 14:01

## 2021-01-01 RX ADMIN — IPRATROPIUM BROMIDE AND ALBUTEROL SULFATE 3 ML: 2.5; .5 SOLUTION RESPIRATORY (INHALATION) at 02:01

## 2021-01-01 RX ADMIN — FERRIC CITRATE 840 MG: 210 TABLET, COATED ORAL at 13:53

## 2021-01-01 RX ADMIN — SODIUM CHLORIDE, PRESERVATIVE FREE 10 ML: 5 INJECTION INTRAVENOUS at 08:32

## 2021-01-01 RX ADMIN — ATROPINE SULFATE 1 DROP: 10 SOLUTION/ DROPS OPHTHALMIC at 09:53

## 2021-01-01 RX ADMIN — INSULIN HUMAN 3 UNITS: 100 INJECTION, SOLUTION PARENTERAL at 18:42

## 2021-01-01 RX ADMIN — VANCOMYCIN HYDROCHLORIDE 125 MG: KIT at 05:31

## 2021-01-01 RX ADMIN — SODIUM CHLORIDE, PRESERVATIVE FREE 10 ML: 5 INJECTION INTRAVENOUS at 20:33

## 2021-01-01 RX ADMIN — BRIMONIDINE TARTRATE 1 DROP: 2 SOLUTION OPHTHALMIC at 09:24

## 2021-01-01 RX ADMIN — OXYCODONE HYDROCHLORIDE AND ACETAMINOPHEN 1 TABLET: 5; 325 TABLET ORAL at 12:55

## 2021-01-01 RX ADMIN — EPOETIN ALFA-EPBX 10000 UNITS: 10000 INJECTION, SOLUTION INTRAVENOUS; SUBCUTANEOUS at 11:20

## 2021-01-01 RX ADMIN — BRIMONIDINE TARTRATE 1 DROP: 2 SOLUTION OPHTHALMIC at 17:26

## 2021-01-01 RX ADMIN — BRIMONIDINE TARTRATE 1 DROP: 2 SOLUTION OPHTHALMIC at 15:45

## 2021-01-01 RX ADMIN — MORPHINE SULFATE 2 MG: 2 INJECTION, SOLUTION INTRAMUSCULAR; INTRAVENOUS at 14:59

## 2021-01-01 RX ADMIN — BRIMONIDINE TARTRATE 1 DROP: 2 SOLUTION OPHTHALMIC at 15:57

## 2021-01-01 RX ADMIN — PRAVASTATIN SODIUM 10 MG: 20 TABLET ORAL at 20:32

## 2021-01-01 RX ADMIN — PROPOFOL 20 MCG/KG/MIN: 10 INJECTION, EMULSION INTRAVENOUS at 22:29

## 2021-01-01 RX ADMIN — CARVEDILOL 6.25 MG: 6.25 TABLET, FILM COATED ORAL at 08:41

## 2021-01-01 RX ADMIN — PROPOFOL 10 MCG/KG/MIN: 10 INJECTION, EMULSION INTRAVENOUS at 08:41

## 2021-01-01 RX ADMIN — SERTRALINE HYDROCHLORIDE 25 MG: 50 TABLET, FILM COATED ORAL at 08:29

## 2021-01-01 RX ADMIN — SODIUM BICARBONATE 50 MEQ: 84 INJECTION INTRAVENOUS at 07:44

## 2021-01-01 RX ADMIN — VANCOMYCIN HYDROCHLORIDE 125 MG: KIT at 05:37

## 2021-01-01 RX ADMIN — PRAVASTATIN SODIUM 10 MG: 20 TABLET ORAL at 21:10

## 2021-01-01 RX ADMIN — ENOXAPARIN SODIUM 30 MG: 30 INJECTION SUBCUTANEOUS at 14:04

## 2021-01-01 RX ADMIN — ATROPINE SULFATE 1 DROP: 10 SOLUTION/ DROPS OPHTHALMIC at 08:45

## 2021-01-01 RX ADMIN — CARVEDILOL 12.5 MG: 6.25 TABLET, FILM COATED ORAL at 09:01

## 2021-01-01 RX ADMIN — TIMOLOL MALEATE 1 DROP: 5 SOLUTION/ DROPS OPHTHALMIC at 05:46

## 2021-01-01 RX ADMIN — TIMOLOL MALEATE 1 DROP: 5 SOLUTION OPHTHALMIC at 14:06

## 2021-01-01 RX ADMIN — VASOPRESSIN 1 ML: 20 INJECTION INTRAVENOUS at 10:01

## 2021-01-01 RX ADMIN — FERRIC CITRATE 840 MG: 210 TABLET, COATED ORAL at 18:01

## 2021-01-01 RX ADMIN — SODIUM CHLORIDE, POTASSIUM CHLORIDE, SODIUM LACTATE AND CALCIUM CHLORIDE 500 ML: 600; 310; 30; 20 INJECTION, SOLUTION INTRAVENOUS at 01:09

## 2021-01-01 RX ADMIN — SCOLOPAMINE TRANSDERMAL SYSTEM 1 PATCH: 1 PATCH, EXTENDED RELEASE TRANSDERMAL at 17:50

## 2021-01-01 RX ADMIN — SODIUM CHLORIDE, PRESERVATIVE FREE 10 ML: 5 INJECTION INTRAVENOUS at 08:26

## 2021-01-01 RX ADMIN — SODIUM CHLORIDE, POTASSIUM CHLORIDE, SODIUM LACTATE AND CALCIUM CHLORIDE 100 ML/HR: 600; 310; 30; 20 INJECTION, SOLUTION INTRAVENOUS at 14:03

## 2021-01-01 RX ADMIN — IPRATROPIUM BROMIDE AND ALBUTEROL SULFATE 3 ML: 2.5; .5 SOLUTION RESPIRATORY (INHALATION) at 22:31

## 2021-01-01 RX ADMIN — VANCOMYCIN HYDROCHLORIDE 125 MG: KIT at 18:01

## 2021-01-01 RX ADMIN — HYDROMORPHONE HYDROCHLORIDE 1 MG: 1 INJECTION, SOLUTION INTRAMUSCULAR; INTRAVENOUS; SUBCUTANEOUS at 17:39

## 2021-01-01 RX ADMIN — ASPIRIN 81 MG: 81 TABLET, COATED ORAL at 13:54

## 2021-01-01 RX ADMIN — INSULIN HUMAN 2 UNITS: 100 INJECTION, SOLUTION PARENTERAL at 11:26

## 2021-01-01 RX ADMIN — PIPERACILLIN AND TAZOBACTAM 3.38 G: 3; .375 INJECTION, POWDER, FOR SOLUTION INTRAVENOUS at 21:06

## 2021-01-01 RX ADMIN — FAMOTIDINE 20 MG: 20 TABLET, FILM COATED ORAL at 08:41

## 2021-01-01 RX ADMIN — SODIUM CHLORIDE 500 ML: 9 INJECTION, SOLUTION INTRAVENOUS at 22:15

## 2021-01-01 RX ADMIN — TIMOLOL MALEATE 1 DROP: 5 SOLUTION/ DROPS OPHTHALMIC at 08:41

## 2021-01-01 RX ADMIN — ACETAMINOPHEN 650 MG: 325 TABLET, FILM COATED ORAL at 22:10

## 2021-01-01 RX ADMIN — BRIMONIDINE TARTRATE 1 DROP: 2 SOLUTION OPHTHALMIC at 16:34

## 2021-01-01 RX ADMIN — VANCOMYCIN HYDROCHLORIDE 125 MG: KIT at 17:32

## 2021-01-01 RX ADMIN — BRIMONIDINE TARTRATE 1 DROP: 2 SOLUTION OPHTHALMIC at 21:05

## 2021-01-01 RX ADMIN — VANCOMYCIN HYDROCHLORIDE 125 MG: KIT at 17:09

## 2021-01-01 RX ADMIN — BRIMONIDINE TARTRATE 1 DROP: 2 SOLUTION OPHTHALMIC at 20:02

## 2021-01-01 RX ADMIN — Medication 1 PATCH: at 06:06

## 2021-01-01 RX ADMIN — PROPOFOL 15 MCG/KG/MIN: 10 INJECTION, EMULSION INTRAVENOUS at 05:56

## 2021-01-01 RX ADMIN — TIMOLOL MALEATE 1 DROP: 5 SOLUTION/ DROPS OPHTHALMIC at 08:27

## 2021-01-01 RX ADMIN — IPRATROPIUM BROMIDE AND ALBUTEROL SULFATE 3 ML: 2.5; .5 SOLUTION RESPIRATORY (INHALATION) at 06:58

## 2021-01-01 RX ADMIN — PROPOFOL 25 MCG/KG/MIN: 10 INJECTION, EMULSION INTRAVENOUS at 07:32

## 2021-01-01 RX ADMIN — FAMOTIDINE 20 MG: 20 TABLET, FILM COATED ORAL at 14:05

## 2021-01-01 RX ADMIN — BRIMONIDINE TARTRATE 1 DROP: 2 SOLUTION OPHTHALMIC at 08:12

## 2021-01-01 RX ADMIN — CEFTRIAXONE SODIUM 2 G: 2 INJECTION, POWDER, FOR SOLUTION INTRAMUSCULAR; INTRAVENOUS at 15:06

## 2021-01-01 RX ADMIN — INSULIN LISPRO 2 UNITS: 100 INJECTION, SOLUTION INTRAVENOUS; SUBCUTANEOUS at 09:23

## 2021-01-01 RX ADMIN — SODIUM CHLORIDE, PRESERVATIVE FREE 10 ML: 5 INJECTION INTRAVENOUS at 22:22

## 2021-01-01 RX ADMIN — Medication 500 MG: at 13:59

## 2021-01-01 RX ADMIN — SERTRALINE HYDROCHLORIDE 25 MG: 50 TABLET, FILM COATED ORAL at 09:00

## 2021-01-01 RX ADMIN — ACETAMINOPHEN 650 MG: 325 TABLET, FILM COATED ORAL at 09:59

## 2021-01-01 RX ADMIN — CHOLESTYRAMINE 1 PACKET: 4 POWDER, FOR SUSPENSION ORAL at 20:57

## 2021-01-01 RX ADMIN — ZIPRASIDONE MESYLATE 10 MG: 20 INJECTION, POWDER, LYOPHILIZED, FOR SOLUTION INTRAMUSCULAR at 03:12

## 2021-01-01 RX ADMIN — HEPARIN SODIUM 5000 UNITS: 5000 INJECTION INTRAVENOUS; SUBCUTANEOUS at 22:21

## 2021-01-01 RX ADMIN — CARVEDILOL 6.25 MG: 6.25 TABLET, FILM COATED ORAL at 08:21

## 2021-01-01 RX ADMIN — TIMOLOL MALEATE 1 DROP: 5 SOLUTION/ DROPS OPHTHALMIC at 06:19

## 2021-01-01 RX ADMIN — BRIMONIDINE TARTRATE 1 DROP: 2 SOLUTION OPHTHALMIC at 20:18

## 2021-01-01 RX ADMIN — SEVELAMER CARBONATE 2400 MG: 800 TABLET, FILM COATED ORAL at 11:02

## 2021-01-01 RX ADMIN — TIMOLOL MALEATE 1 DROP: 5 SOLUTION OPHTHALMIC at 17:25

## 2021-01-01 RX ADMIN — INSULIN LISPRO 3 UNITS: 100 INJECTION, SOLUTION INTRAVENOUS; SUBCUTANEOUS at 12:10

## 2021-01-01 RX ADMIN — SODIUM CHLORIDE, PRESERVATIVE FREE 10 ML: 5 INJECTION INTRAVENOUS at 12:32

## 2021-01-01 RX ADMIN — FERRIC CITRATE 840 MG: 210 TABLET, COATED ORAL at 08:23

## 2021-01-01 RX ADMIN — HYDROMORPHONE HYDROCHLORIDE 1 MG: 1 INJECTION, SOLUTION INTRAMUSCULAR; INTRAVENOUS; SUBCUTANEOUS at 23:21

## 2021-01-01 RX ADMIN — BRIMONIDINE TARTRATE 1 DROP: 2 SOLUTION OPHTHALMIC at 17:44

## 2021-01-01 RX ADMIN — VASOPRESSIN 1 ML: 20 INJECTION INTRAVENOUS at 10:03

## 2021-01-01 RX ADMIN — Medication 500 MG: at 20:30

## 2021-01-01 RX ADMIN — DEXTROSE MONOHYDRATE 25 G: 500 INJECTION PARENTERAL at 16:38

## 2021-01-01 RX ADMIN — INSULIN LISPRO 3 UNITS: 100 INJECTION, SOLUTION INTRAVENOUS; SUBCUTANEOUS at 12:46

## 2021-01-01 RX ADMIN — METOCLOPRAMIDE 5 MG: 5 TABLET ORAL at 21:18

## 2021-01-01 RX ADMIN — VANCOMYCIN HYDROCHLORIDE 125 MG: KIT at 23:32

## 2021-01-01 RX ADMIN — FAMOTIDINE 20 MG: 20 TABLET, FILM COATED ORAL at 08:30

## 2021-01-01 RX ADMIN — SODIUM CHLORIDE 8 MG/HR: 900 INJECTION INTRAVENOUS at 15:00

## 2021-01-01 RX ADMIN — METOCLOPRAMIDE 5 MG: 5 TABLET ORAL at 12:10

## 2021-01-01 RX ADMIN — TIMOLOL MALEATE 1 DROP: 5 SOLUTION/ DROPS OPHTHALMIC at 08:54

## 2021-01-01 RX ADMIN — ATROPINE SULFATE 1 DROP: 10 SOLUTION/ DROPS OPHTHALMIC at 08:21

## 2021-01-01 RX ADMIN — BRIMONIDINE TARTRATE 1 DROP: 2 SOLUTION OPHTHALMIC at 21:50

## 2021-01-01 RX ADMIN — BRIMONIDINE TARTRATE 1 DROP: 2 SOLUTION OPHTHALMIC at 20:31

## 2021-01-01 RX ADMIN — PROPOFOL 25 MCG/KG/MIN: 10 INJECTION, EMULSION INTRAVENOUS at 15:56

## 2021-01-01 RX ADMIN — CARVEDILOL 6.25 MG: 6.25 TABLET, FILM COATED ORAL at 18:30

## 2021-01-01 RX ADMIN — VANCOMYCIN HYDROCHLORIDE 125 MG: KIT at 05:46

## 2021-01-01 RX ADMIN — Medication 1 PATCH: at 05:50

## 2021-01-01 RX ADMIN — Medication 100 MCG: at 09:59

## 2021-01-01 RX ADMIN — NICOTINE 1 PATCH: 21 PATCH, EXTENDED RELEASE TRANSDERMAL at 04:46

## 2021-01-01 RX ADMIN — FERRIC CITRATE 840 MG: 210 TABLET, COATED ORAL at 14:24

## 2021-01-01 RX ADMIN — DEXTROSE MONOHYDRATE 75 ML/HR: 100 INJECTION, SOLUTION INTRAVENOUS at 17:37

## 2021-01-01 RX ADMIN — TIMOLOL MALEATE 1 DROP: 5 SOLUTION/ DROPS OPHTHALMIC at 10:01

## 2021-01-01 RX ADMIN — VANCOMYCIN HYDROCHLORIDE 125 MG: KIT at 00:12

## 2021-01-01 RX ADMIN — HEPARIN SODIUM 5000 UNITS: 5000 INJECTION INTRAVENOUS; SUBCUTANEOUS at 22:05

## 2021-01-01 RX ADMIN — Medication 1 CAPSULE: at 08:59

## 2021-01-01 RX ADMIN — FAMOTIDINE 20 MG: 20 TABLET, FILM COATED ORAL at 09:31

## 2021-01-01 RX ADMIN — FERRIC CITRATE 840 MG: 210 TABLET, COATED ORAL at 09:00

## 2021-01-01 RX ADMIN — FERRIC CITRATE 840 MG: 210 TABLET, COATED ORAL at 18:29

## 2021-01-01 RX ADMIN — SODIUM BICARBONATE 50 MEQ: 84 INJECTION INTRAVENOUS at 07:45

## 2021-01-01 RX ADMIN — METRONIDAZOLE 500 MG: 500 INJECTION, SOLUTION INTRAVENOUS at 15:10

## 2021-01-01 RX ADMIN — CHOLESTYRAMINE 1 PACKET: 4 POWDER, FOR SUSPENSION ORAL at 15:30

## 2021-01-01 RX ADMIN — MEROPENEM 500 MG: 500 INJECTION, POWDER, FOR SOLUTION INTRAVENOUS at 13:58

## 2021-01-01 RX ADMIN — EPOETIN ALFA-EPBX 10000 UNITS: 10000 INJECTION, SOLUTION INTRAVENOUS; SUBCUTANEOUS at 18:36

## 2021-01-01 RX ADMIN — FAMOTIDINE 20 MG: 20 TABLET, FILM COATED ORAL at 08:28

## 2021-01-01 RX ADMIN — DORZOLAMIDE HYDROCHLORIDE 1 DROP: 20 SOLUTION/ DROPS OPHTHALMIC at 12:31

## 2021-01-01 RX ADMIN — ATROPINE SULFATE 1 DROP: 10 SOLUTION/ DROPS OPHTHALMIC at 09:14

## 2021-01-01 RX ADMIN — FERRIC CITRATE 840 MG: 210 TABLET, COATED ORAL at 08:24

## 2021-01-01 RX ADMIN — ACETAMINOPHEN 650 MG: 325 TABLET, FILM COATED ORAL at 16:54

## 2021-01-01 RX ADMIN — BRIMONIDINE TARTRATE 1 DROP: 2 SOLUTION OPHTHALMIC at 12:31

## 2021-01-01 RX ADMIN — FLUTICASONE PROPIONATE 2 SPRAY: 50 SPRAY, METERED NASAL at 14:05

## 2021-01-01 RX ADMIN — ASPIRIN 81 MG: 81 TABLET, COATED ORAL at 09:00

## 2021-01-01 RX ADMIN — INSULIN HUMAN 5 UNITS: 100 INJECTION, SOLUTION PARENTERAL at 17:33

## 2021-01-01 RX ADMIN — SODIUM CHLORIDE 50 ML/HR: 9 INJECTION, SOLUTION INTRAVENOUS at 23:20

## 2021-01-01 RX ADMIN — VASOPRESSIN 1 ML: 20 INJECTION INTRAVENOUS at 10:04

## 2021-01-01 RX ADMIN — BRIMONIDINE TARTRATE 1 DROP: 2 SOLUTION OPHTHALMIC at 08:13

## 2021-01-01 RX ADMIN — BRIMONIDINE TARTRATE 1 DROP: 2 SOLUTION OPHTHALMIC at 08:54

## 2021-01-01 RX ADMIN — CARVEDILOL 6.25 MG: 6.25 TABLET, FILM COATED ORAL at 07:48

## 2021-01-01 RX ADMIN — SODIUM CHLORIDE, PRESERVATIVE FREE 10 ML: 5 INJECTION INTRAVENOUS at 08:12

## 2021-01-01 RX ADMIN — POTASSIUM CHLORIDE 20 MEQ: 14.9 INJECTION, SOLUTION INTRAVENOUS at 04:23

## 2021-01-01 RX ADMIN — FAMOTIDINE 20 MG: 20 TABLET, FILM COATED ORAL at 09:01

## 2021-01-01 RX ADMIN — HEPARIN SODIUM 5000 UNITS: 5000 INJECTION INTRAVENOUS; SUBCUTANEOUS at 13:58

## 2021-01-01 RX ADMIN — FLUTICASONE PROPIONATE 2 SPRAY: 50 SPRAY, METERED NASAL at 08:53

## 2021-01-01 RX ADMIN — ACETAMINOPHEN 650 MG: 325 TABLET, FILM COATED ORAL at 17:41

## 2021-01-01 RX ADMIN — INSULIN HUMAN 3 UNITS: 100 INJECTION, SOLUTION PARENTERAL at 08:27

## 2021-01-01 RX ADMIN — VANCOMYCIN HYDROCHLORIDE 125 MG: KIT at 00:29

## 2021-01-01 RX ADMIN — Medication 1 CAPSULE: at 09:32

## 2021-01-01 RX ADMIN — FERRIC CITRATE 840 MG: 210 TABLET, COATED ORAL at 14:05

## 2021-01-01 RX ADMIN — ENOXAPARIN SODIUM 30 MG: 30 INJECTION SUBCUTANEOUS at 09:20

## 2021-01-01 RX ADMIN — FLUTICASONE PROPIONATE 2 SPRAY: 50 SPRAY, METERED NASAL at 08:25

## 2021-01-01 RX ADMIN — BRIMONIDINE TARTRATE 1 DROP: 2 SOLUTION OPHTHALMIC at 21:56

## 2021-01-01 RX ADMIN — VANCOMYCIN HYDROCHLORIDE 125 MG: KIT at 18:35

## 2021-01-01 RX ADMIN — VANCOMYCIN HYDROCHLORIDE 125 MG: KIT at 12:50

## 2021-01-01 RX ADMIN — SODIUM CHLORIDE 8 MG/HR: 900 INJECTION INTRAVENOUS at 05:06

## 2021-01-01 RX ADMIN — INSULIN LISPRO 2 UNITS: 100 INJECTION, SOLUTION INTRAVENOUS; SUBCUTANEOUS at 13:25

## 2021-01-01 RX ADMIN — METRONIDAZOLE 500 MG: 500 INJECTION, SOLUTION INTRAVENOUS at 22:08

## 2021-01-01 RX ADMIN — HEPARIN SODIUM 5000 UNITS: 5000 INJECTION INTRAVENOUS; SUBCUTANEOUS at 20:00

## 2021-01-01 RX ADMIN — METHYLPREDNISOLONE SODIUM SUCCINATE 60 MG: 125 INJECTION, POWDER, FOR SOLUTION INTRAMUSCULAR; INTRAVENOUS at 21:03

## 2021-01-01 RX ADMIN — DORZOLAMIDE HYDROCHLORIDE 1 DROP: 20 SOLUTION/ DROPS OPHTHALMIC at 11:23

## 2021-01-01 RX ADMIN — LEVOTHYROXINE SODIUM 25 MCG: 25 TABLET ORAL at 04:43

## 2021-01-01 RX ADMIN — SODIUM CHLORIDE, PRESERVATIVE FREE 10 ML: 5 INJECTION INTRAVENOUS at 20:57

## 2021-01-01 RX ADMIN — SEVELAMER CARBONATE 800 MG: 800 TABLET, FILM COATED ORAL at 12:56

## 2021-01-01 RX ADMIN — ATROPINE SULFATE 1 DROP: 10 SOLUTION/ DROPS OPHTHALMIC at 10:00

## 2021-01-01 RX ADMIN — FERRIC CITRATE 840 MG: 210 TABLET, COATED ORAL at 08:53

## 2021-01-01 RX ADMIN — SERTRALINE HYDROCHLORIDE 25 MG: 25 TABLET ORAL at 08:12

## 2021-01-01 RX ADMIN — PRAVASTATIN SODIUM 10 MG: 20 TABLET ORAL at 20:16

## 2021-01-01 RX ADMIN — ONDANSETRON HYDROCHLORIDE 4 MG: 2 SOLUTION INTRAMUSCULAR; INTRAVENOUS at 23:21

## 2021-01-01 RX ADMIN — TIMOLOL MALEATE 1 DROP: 5 SOLUTION/ DROPS OPHTHALMIC at 08:13

## 2021-01-01 RX ADMIN — METOCLOPRAMIDE 5 MG: 5 TABLET ORAL at 21:05

## 2021-01-01 RX ADMIN — VANCOMYCIN HYDROCHLORIDE 125 MG: KIT at 06:11

## 2021-01-01 RX ADMIN — TIMOLOL MALEATE 1 DROP: 5 SOLUTION/ DROPS OPHTHALMIC at 06:17

## 2021-01-01 RX ADMIN — TRAMADOL HYDROCHLORIDE 25 MG: 50 TABLET, FILM COATED ORAL at 04:21

## 2021-01-01 RX ADMIN — SERTRALINE HYDROCHLORIDE 25 MG: 25 TABLET ORAL at 15:07

## 2021-01-01 RX ADMIN — VANCOMYCIN HYDROCHLORIDE 125 MG: KIT at 12:27

## 2021-01-01 RX ADMIN — FLUTICASONE PROPIONATE 2 SPRAY: 50 SPRAY, METERED NASAL at 08:22

## 2021-01-01 RX ADMIN — SEVELAMER CARBONATE 2400 MG: 800 TABLET, FILM COATED ORAL at 09:48

## 2021-01-01 RX ADMIN — VANCOMYCIN HYDROCHLORIDE 125 MG: KIT at 01:18

## 2021-01-01 RX ADMIN — PRAVASTATIN SODIUM 10 MG: 20 TABLET ORAL at 20:00

## 2021-01-01 RX ADMIN — DORZOLAMIDE HYDROCHLORIDE 1 DROP: 20 SOLUTION/ DROPS OPHTHALMIC at 21:04

## 2021-01-01 RX ADMIN — ACETAMINOPHEN 650 MG: 325 TABLET, FILM COATED ORAL at 09:51

## 2021-01-01 RX ADMIN — FERRIC CITRATE 840 MG: 210 TABLET, COATED ORAL at 11:46

## 2021-01-01 RX ADMIN — VANCOMYCIN HYDROCHLORIDE 125 MG: KIT at 05:33

## 2021-01-01 RX ADMIN — ATROPINE SULFATE 1 DROP: 10 SOLUTION/ DROPS OPHTHALMIC at 08:35

## 2021-01-01 RX ADMIN — DORZOLAMIDE HYDROCHLORIDE 1 DROP: 20 SOLUTION/ DROPS OPHTHALMIC at 08:29

## 2021-01-01 RX ADMIN — FUROSEMIDE 80 MG: 10 INJECTION, SOLUTION INTRAMUSCULAR; INTRAVENOUS at 21:07

## 2021-01-01 RX ADMIN — MEROPENEM 1 G: 1 INJECTION INTRAVENOUS at 11:08

## 2021-01-01 RX ADMIN — TAZOBACTAM SODIUM AND PIPERACILLIN SODIUM 3.38 G: 375; 3 INJECTION, SOLUTION INTRAVENOUS at 08:59

## 2021-01-01 RX ADMIN — VANCOMYCIN HYDROCHLORIDE 125 MG: KIT at 05:38

## 2021-01-01 RX ADMIN — BRIMONIDINE TARTRATE 1 DROP: 2 SOLUTION OPHTHALMIC at 13:57

## 2021-01-01 RX ADMIN — TIMOLOL MALEATE 1 DROP: 5 SOLUTION/ DROPS OPHTHALMIC at 05:50

## 2021-01-01 RX ADMIN — FLUTICASONE PROPIONATE 2 SPRAY: 50 SPRAY, METERED NASAL at 20:59

## 2021-01-01 RX ADMIN — VANCOMYCIN HYDROCHLORIDE 1250 MG: 10 INJECTION, POWDER, LYOPHILIZED, FOR SOLUTION INTRAVENOUS at 21:11

## 2021-01-01 RX ADMIN — INSULIN HUMAN 2 UNITS: 100 INJECTION, SOLUTION PARENTERAL at 18:31

## 2021-01-01 RX ADMIN — METRONIDAZOLE 500 MG: 500 INJECTION, SOLUTION INTRAVENOUS at 09:32

## 2021-01-01 RX ADMIN — IPRATROPIUM BROMIDE AND ALBUTEROL SULFATE 3 ML: 2.5; .5 SOLUTION RESPIRATORY (INHALATION) at 19:26

## 2021-01-01 RX ADMIN — Medication 1 CAPSULE: at 08:11

## 2021-01-01 RX ADMIN — FLUTICASONE PROPIONATE 2 SPRAY: 50 SPRAY, METERED NASAL at 21:10

## 2021-01-01 RX ADMIN — EPHEDRINE SULFATE 25 MG: 50 INJECTION INTRAVENOUS at 09:55

## 2021-01-01 RX ADMIN — PRAVASTATIN SODIUM 10 MG: 20 TABLET ORAL at 22:20

## 2021-01-01 RX ADMIN — DEXTROSE AND SODIUM CHLORIDE 100 ML/HR: 5; 900 INJECTION, SOLUTION INTRAVENOUS at 03:30

## 2021-01-01 RX ADMIN — FENTANYL CITRATE 100 MCG: 50 INJECTION, SOLUTION INTRAMUSCULAR; INTRAVENOUS at 09:54

## 2021-01-01 RX ADMIN — METRONIDAZOLE 500 MG: 500 INJECTION, SOLUTION INTRAVENOUS at 11:42

## 2021-01-01 RX ADMIN — ATROPINE SULFATE 1 DROP: 10 SOLUTION/ DROPS OPHTHALMIC at 08:34

## 2021-01-01 RX ADMIN — OXYCODONE HYDROCHLORIDE AND ACETAMINOPHEN 1 TABLET: 5; 325 TABLET ORAL at 04:44

## 2021-01-01 RX ADMIN — TIMOLOL MALEATE 1 DROP: 5 SOLUTION/ DROPS OPHTHALMIC at 09:53

## 2021-01-01 RX ADMIN — DEXTROSE MONOHYDRATE 25 G: 25 INJECTION, SOLUTION INTRAVENOUS at 11:06

## 2021-01-01 RX ADMIN — SODIUM CHLORIDE, PRESERVATIVE FREE 10 ML: 5 INJECTION INTRAVENOUS at 21:49

## 2021-01-01 RX ADMIN — SERTRALINE HYDROCHLORIDE 25 MG: 50 TABLET, FILM COATED ORAL at 09:38

## 2021-01-01 RX ADMIN — MORPHINE SULFATE 4 MG: 4 INJECTION, SOLUTION INTRAMUSCULAR; INTRAVENOUS at 14:58

## 2021-01-01 RX ADMIN — SODIUM CHLORIDE, PRESERVATIVE FREE 10 ML: 5 INJECTION INTRAVENOUS at 20:02

## 2021-01-01 RX ADMIN — FAMOTIDINE 20 MG: 20 TABLET, FILM COATED ORAL at 13:59

## 2021-01-01 RX ADMIN — Medication 500 MG: at 20:57

## 2021-01-01 RX ADMIN — TIMOLOL MALEATE 1 DROP: 5 SOLUTION/ DROPS OPHTHALMIC at 13:11

## 2021-01-01 RX ADMIN — FLUTICASONE PROPIONATE 2 SPRAY: 50 SPRAY, METERED NASAL at 21:58

## 2021-01-01 RX ADMIN — BRIMONIDINE TARTRATE 1 DROP: 2 SOLUTION OPHTHALMIC at 15:17

## 2021-01-01 RX ADMIN — SODIUM CHLORIDE 50 ML/HR: 9 INJECTION, SOLUTION INTRAVENOUS at 22:15

## 2021-01-01 RX ADMIN — ASPIRIN 81 MG: 81 TABLET, COATED ORAL at 09:52

## 2021-01-01 RX ADMIN — SEVELAMER CARBONATE 2400 MG: 800 TABLET, FILM COATED ORAL at 17:07

## 2021-01-01 RX ADMIN — IPRATROPIUM BROMIDE AND ALBUTEROL SULFATE 3 ML: 2.5; .5 SOLUTION RESPIRATORY (INHALATION) at 14:59

## 2021-01-01 RX ADMIN — ACETAMINOPHEN 650 MG: 325 TABLET, FILM COATED ORAL at 08:11

## 2021-01-01 RX ADMIN — SODIUM CHLORIDE, PRESERVATIVE FREE 10 ML: 5 INJECTION INTRAVENOUS at 09:08

## 2021-01-01 RX ADMIN — BRIMONIDINE TARTRATE 1 DROP: 2 SOLUTION OPHTHALMIC at 20:57

## 2021-01-01 RX ADMIN — Medication 500 MG: at 08:41

## 2021-01-01 RX ADMIN — MIDODRINE HYDROCHLORIDE 5 MG: 2.5 TABLET ORAL at 06:12

## 2021-01-01 RX ADMIN — BRIMONIDINE TARTRATE 1 DROP: 2 SOLUTION OPHTHALMIC at 09:53

## 2021-01-01 RX ADMIN — FERRIC CITRATE 840 MG: 210 TABLET, COATED ORAL at 08:11

## 2021-01-01 RX ADMIN — SODIUM CHLORIDE, PRESERVATIVE FREE 10 ML: 5 INJECTION INTRAVENOUS at 21:05

## 2021-01-01 RX ADMIN — LEVETIRACETAM 1000 MG: 10 INJECTION INTRAVENOUS at 12:01

## 2021-01-01 RX ADMIN — BRIMONIDINE TARTRATE 1 DROP: 2 SOLUTION OPHTHALMIC at 21:53

## 2021-01-01 RX ADMIN — FAMOTIDINE 20 MG: 20 TABLET, FILM COATED ORAL at 17:29

## 2021-01-01 RX ADMIN — IPRATROPIUM BROMIDE AND ALBUTEROL SULFATE 3 ML: 2.5; .5 SOLUTION RESPIRATORY (INHALATION) at 18:49

## 2021-01-01 RX ADMIN — CARVEDILOL 6.25 MG: 6.25 TABLET, FILM COATED ORAL at 17:26

## 2021-01-01 RX ADMIN — VANCOMYCIN HYDROCHLORIDE 125 MG: KIT at 23:55

## 2021-01-01 RX ADMIN — INSULIN HUMAN 2 UNITS: 100 INJECTION, SOLUTION PARENTERAL at 12:42

## 2021-01-01 RX ADMIN — METRONIDAZOLE 500 MG: 500 INJECTION, SOLUTION INTRAVENOUS at 06:35

## 2021-01-01 RX ADMIN — IPRATROPIUM BROMIDE AND ALBUTEROL SULFATE 3 ML: 2.5; .5 SOLUTION RESPIRATORY (INHALATION) at 10:39

## 2021-01-01 RX ADMIN — SODIUM CHLORIDE, PRESERVATIVE FREE 10 ML: 5 INJECTION INTRAVENOUS at 22:24

## 2021-01-01 RX ADMIN — PREGABALIN 50 MG: 50 CAPSULE ORAL at 09:50

## 2021-01-01 RX ADMIN — ASPIRIN 81 MG: 81 TABLET, COATED ORAL at 09:21

## 2021-01-01 RX ADMIN — VANCOMYCIN HYDROCHLORIDE 125 MG: KIT at 05:34

## 2021-01-01 RX ADMIN — PANTOPRAZOLE SODIUM 80 MG: 40 INJECTION, POWDER, FOR SOLUTION INTRAVENOUS at 00:52

## 2021-01-01 RX ADMIN — PRAVASTATIN SODIUM 10 MG: 20 TABLET ORAL at 21:49

## 2021-01-01 RX ADMIN — MORPHINE SULFATE 0.5 MG: 2 INJECTION, SOLUTION INTRAMUSCULAR; INTRAVENOUS at 04:25

## 2021-01-01 RX ADMIN — VANCOMYCIN HYDROCHLORIDE 125 MG: KIT at 05:41

## 2021-01-01 RX ADMIN — FLUTICASONE PROPIONATE 2 SPRAY: 50 SPRAY, METERED NASAL at 08:13

## 2021-01-01 RX ADMIN — PRAVASTATIN SODIUM 10 MG: 20 TABLET ORAL at 21:05

## 2021-01-01 RX ADMIN — TAZOBACTAM SODIUM AND PIPERACILLIN SODIUM 3.38 G: 375; 3 INJECTION, SOLUTION INTRAVENOUS at 20:57

## 2021-01-01 RX ADMIN — FLUTICASONE PROPIONATE 2 SPRAY: 50 SPRAY, METERED NASAL at 21:50

## 2021-01-01 RX ADMIN — FLUTICASONE PROPIONATE 2 SPRAY: 50 SPRAY, METERED NASAL at 21:06

## 2021-01-01 RX ADMIN — INSULIN HUMAN 4 UNITS: 100 INJECTION, SOLUTION PARENTERAL at 15:39

## 2021-01-01 RX ADMIN — SODIUM CHLORIDE 500 ML: 9 INJECTION, SOLUTION INTRAVENOUS at 07:03

## 2021-01-01 RX ADMIN — BRIMONIDINE TARTRATE 1 DROP: 2 SOLUTION OPHTHALMIC at 20:01

## 2021-01-01 RX ADMIN — PRAVASTATIN SODIUM 10 MG: 20 TABLET ORAL at 22:13

## 2021-01-01 RX ADMIN — EPOETIN ALFA-EPBX 10000 UNITS: 10000 INJECTION, SOLUTION INTRAVENOUS; SUBCUTANEOUS at 14:45

## 2021-01-01 RX ADMIN — SERTRALINE HYDROCHLORIDE 25 MG: 25 TABLET ORAL at 08:20

## 2021-01-01 RX ADMIN — PRAVASTATIN SODIUM 10 MG: 20 TABLET ORAL at 20:30

## 2021-01-01 RX ADMIN — BRIMONIDINE TARTRATE 1 DROP: 2 SOLUTION OPHTHALMIC at 08:08

## 2021-01-01 RX ADMIN — ONDANSETRON 4 MG: 4 TABLET, ORALLY DISINTEGRATING ORAL at 17:39

## 2021-01-01 RX ADMIN — VANCOMYCIN HYDROCHLORIDE 125 MG: KIT at 14:45

## 2021-01-01 RX ADMIN — TAZOBACTAM SODIUM AND PIPERACILLIN SODIUM 3.38 G: 375; 3 INJECTION, SOLUTION INTRAVENOUS at 21:29

## 2021-01-01 RX ADMIN — ATROPINE SULFATE 1 DROP: 10 SOLUTION/ DROPS OPHTHALMIC at 08:28

## 2021-01-01 RX ADMIN — CARVEDILOL 6.25 MG: 6.25 TABLET, FILM COATED ORAL at 17:05

## 2021-01-01 RX ADMIN — INSULIN HUMAN 3 UNITS: 100 INJECTION, SOLUTION PARENTERAL at 12:20

## 2021-01-01 RX ADMIN — ATROPINE SULFATE 1 DROP: 10 SOLUTION/ DROPS OPHTHALMIC at 08:12

## 2021-01-01 RX ADMIN — Medication 2000 UNITS: at 14:06

## 2021-01-01 RX ADMIN — TIMOLOL MALEATE 1 DROP: 5 SOLUTION/ DROPS OPHTHALMIC at 09:08

## 2021-01-01 RX ADMIN — BRIMONIDINE TARTRATE 1 DROP: 2 SOLUTION OPHTHALMIC at 09:23

## 2021-01-01 RX ADMIN — SODIUM CHLORIDE, POTASSIUM CHLORIDE, SODIUM LACTATE AND CALCIUM CHLORIDE 50 ML/HR: 600; 310; 30; 20 INJECTION, SOLUTION INTRAVENOUS at 01:20

## 2021-01-01 RX ADMIN — INSULIN LISPRO 3 UNITS: 100 INJECTION, SOLUTION INTRAVENOUS; SUBCUTANEOUS at 17:28

## 2021-01-01 RX ADMIN — FLUTICASONE PROPIONATE 2 SPRAY: 50 SPRAY, METERED NASAL at 08:32

## 2021-01-01 RX ADMIN — Medication 500 MG: at 20:17

## 2021-01-01 RX ADMIN — VANCOMYCIN HYDROCHLORIDE 125 MG: KIT at 06:19

## 2021-01-01 RX ADMIN — VANCOMYCIN HYDROCHLORIDE 125 MG: KIT at 12:22

## 2021-01-01 RX ADMIN — FERRIC CITRATE 840 MG: 210 TABLET, COATED ORAL at 11:28

## 2021-01-01 RX ADMIN — DIPHENHYDRAMINE HYDROCHLORIDE 25 MG: 50 INJECTION, SOLUTION INTRAMUSCULAR; INTRAVENOUS at 22:13

## 2021-01-01 RX ADMIN — PERFLUTREN 8.48 ML: 6.52 INJECTION, SUSPENSION INTRAVENOUS at 11:51

## 2021-01-01 RX ADMIN — FLUTICASONE PROPIONATE 2 SPRAY: 50 SPRAY, METERED NASAL at 11:20

## 2021-01-01 RX ADMIN — SERTRALINE HYDROCHLORIDE 25 MG: 25 TABLET ORAL at 08:23

## 2021-01-01 RX ADMIN — PRAVASTATIN SODIUM 10 MG: 20 TABLET ORAL at 20:17

## 2021-01-01 RX ADMIN — LEVOTHYROXINE SODIUM 50 MCG: 50 TABLET ORAL at 04:24

## 2021-01-01 RX ADMIN — PRAVASTATIN SODIUM 10 MG: 20 TABLET ORAL at 20:28

## 2021-01-01 RX ADMIN — METOCLOPRAMIDE 5 MG: 5 TABLET ORAL at 17:28

## 2021-01-01 RX ADMIN — ATROPINE SULFATE 1 DROP: 10 SOLUTION/ DROPS OPHTHALMIC at 12:30

## 2021-01-01 RX ADMIN — INSULIN LISPRO 4 UNITS: 100 INJECTION, SOLUTION INTRAVENOUS; SUBCUTANEOUS at 11:27

## 2021-01-01 RX ADMIN — SERTRALINE HYDROCHLORIDE 25 MG: 50 TABLET, FILM COATED ORAL at 13:00

## 2021-01-01 RX ADMIN — TIMOLOL MALEATE 1 DROP: 5 SOLUTION/ DROPS OPHTHALMIC at 12:42

## 2021-01-01 RX ADMIN — BRIMONIDINE TARTRATE 1 DROP: 2 SOLUTION OPHTHALMIC at 08:27

## 2021-01-01 RX ADMIN — TIMOLOL MALEATE 1 DROP: 5 SOLUTION/ DROPS OPHTHALMIC at 13:54

## 2021-01-01 RX ADMIN — ACETAMINOPHEN 650 MG: 325 TABLET, FILM COATED ORAL at 12:37

## 2021-01-01 RX ADMIN — SODIUM CHLORIDE, PRESERVATIVE FREE 10 ML: 5 INJECTION INTRAVENOUS at 20:29

## 2021-01-01 RX ADMIN — EPOETIN ALFA-EPBX 10000 UNITS: 10000 INJECTION, SOLUTION INTRAVENOUS; SUBCUTANEOUS at 22:16

## 2021-01-01 RX ADMIN — PRAVASTATIN SODIUM 10 MG: 20 TABLET ORAL at 22:05

## 2021-01-01 RX ADMIN — ATROPINE SULFATE 1 DROP: 10 SOLUTION/ DROPS OPHTHALMIC at 11:24

## 2021-01-01 RX ADMIN — VANCOMYCIN HYDROCHLORIDE 125 MG: KIT at 11:46

## 2021-01-01 RX ADMIN — ATROPINE SULFATE 1 DROP: 10 SOLUTION/ DROPS OPHTHALMIC at 13:56

## 2021-01-01 RX ADMIN — INSULIN LISPRO 2 UNITS: 100 INJECTION, SOLUTION INTRAVENOUS; SUBCUTANEOUS at 09:02

## 2021-01-01 RX ADMIN — FLUTICASONE PROPIONATE 2 SPRAY: 50 SPRAY, METERED NASAL at 08:12

## 2021-01-01 RX ADMIN — LOPERAMIDE HYDROCHLORIDE 4 MG: 2 CAPSULE ORAL at 14:52

## 2021-01-01 RX ADMIN — PREGABALIN 50 MG: 50 CAPSULE ORAL at 20:37

## 2021-01-01 RX ADMIN — PROPOFOL 30 MCG/KG/MIN: 10 INJECTION, EMULSION INTRAVENOUS at 09:54

## 2021-01-01 RX ADMIN — CARVEDILOL 6.25 MG: 6.25 TABLET, FILM COATED ORAL at 17:53

## 2021-01-01 RX ADMIN — CARVEDILOL 6.25 MG: 6.25 TABLET, FILM COATED ORAL at 18:01

## 2021-01-01 RX ADMIN — Medication 2000 UNITS: at 09:21

## 2021-01-01 RX ADMIN — SODIUM CHLORIDE, PRESERVATIVE FREE 10 ML: 5 INJECTION INTRAVENOUS at 09:32

## 2021-01-01 RX ADMIN — Medication 2000 UNITS: at 09:00

## 2021-01-01 RX ADMIN — INSULIN HUMAN 3 UNITS: 100 INJECTION, SOLUTION PARENTERAL at 17:41

## 2021-01-01 RX ADMIN — ATROPINE SULFATE 1 DROP: 10 SOLUTION/ DROPS OPHTHALMIC at 14:05

## 2021-01-01 RX ADMIN — VANCOMYCIN HYDROCHLORIDE 125 MG: KIT at 05:55

## 2021-01-01 RX ADMIN — INSULIN DETEMIR 10 UNITS: 100 INJECTION, SOLUTION SUBCUTANEOUS at 22:00

## 2021-01-01 RX ADMIN — METOCLOPRAMIDE 5 MG: 5 TABLET ORAL at 05:51

## 2021-01-01 RX ADMIN — Medication 2000 UNITS: at 08:53

## 2021-01-01 RX ADMIN — SODIUM CHLORIDE, PRESERVATIVE FREE 10 ML: 5 INJECTION INTRAVENOUS at 09:27

## 2021-01-01 RX ADMIN — SODIUM CHLORIDE, PRESERVATIVE FREE 10 ML: 5 INJECTION INTRAVENOUS at 09:38

## 2021-01-01 RX ADMIN — SERTRALINE HYDROCHLORIDE 25 MG: 50 TABLET, FILM COATED ORAL at 08:28

## 2021-01-01 RX ADMIN — VANCOMYCIN HYDROCHLORIDE 750 MG: 10 INJECTION, POWDER, LYOPHILIZED, FOR SOLUTION INTRAVENOUS at 17:22

## 2021-01-01 RX ADMIN — SEVELAMER CARBONATE 2400 MG: 800 TABLET, FILM COATED ORAL at 17:30

## 2021-01-01 RX ADMIN — INSULIN DETEMIR 10 UNITS: 100 INJECTION, SOLUTION SUBCUTANEOUS at 20:35

## 2021-01-01 RX ADMIN — Medication 1 CAPSULE: at 15:07

## 2021-01-01 RX ADMIN — ACETAMINOPHEN 650 MG: 325 TABLET, FILM COATED ORAL at 22:20

## 2021-01-01 RX ADMIN — LEVOTHYROXINE SODIUM 25 MCG: 25 TABLET ORAL at 05:50

## 2021-01-01 RX ADMIN — FAMOTIDINE 20 MG: 20 TABLET, FILM COATED ORAL at 08:29

## 2021-01-01 RX ADMIN — CLINDAMYCIN IN 5 PERCENT DEXTROSE 900 MG: 18 INJECTION, SOLUTION INTRAVENOUS at 09:47

## 2021-01-01 RX ADMIN — ACETAMINOPHEN 650 MG: 325 TABLET, FILM COATED ORAL at 21:34

## 2021-01-01 RX ADMIN — LEVOTHYROXINE SODIUM 25 MCG: 25 TABLET ORAL at 06:06

## 2021-01-01 RX ADMIN — Medication 500 MG: at 20:18

## 2021-01-01 RX ADMIN — ASPIRIN 81 MG: 81 TABLET, COATED ORAL at 08:48

## 2021-01-01 RX ADMIN — METOCLOPRAMIDE 5 MG: 5 TABLET ORAL at 20:57

## 2021-01-01 RX ADMIN — SODIUM CHLORIDE, PRESERVATIVE FREE 10 ML: 5 INJECTION INTRAVENOUS at 21:29

## 2021-01-01 RX ADMIN — FERRIC CITRATE 840 MG: 210 TABLET, COATED ORAL at 17:28

## 2021-01-01 RX ADMIN — Medication 1 CAPSULE: at 09:24

## 2021-01-01 RX ADMIN — INSULIN HUMAN 2 UNITS: 100 INJECTION, SOLUTION PARENTERAL at 05:40

## 2021-01-01 RX ADMIN — SERTRALINE HYDROCHLORIDE 25 MG: 25 TABLET ORAL at 08:24

## 2021-01-01 RX ADMIN — LORAZEPAM 1 MG: 2 INJECTION INTRAMUSCULAR; INTRAVENOUS at 14:21

## 2021-01-01 RX ADMIN — HEPARIN SODIUM 5000 UNITS: 5000 INJECTION INTRAVENOUS; SUBCUTANEOUS at 20:56

## 2021-01-01 RX ADMIN — PRAVASTATIN SODIUM 10 MG: 20 TABLET ORAL at 21:19

## 2021-01-01 RX ADMIN — ASPIRIN 81 MG: 81 TABLET, COATED ORAL at 09:01

## 2021-01-01 RX ADMIN — INSULIN DETEMIR 10 UNITS: 100 INJECTION, SOLUTION SUBCUTANEOUS at 20:45

## 2021-01-01 RX ADMIN — VANCOMYCIN HYDROCHLORIDE 125 MG: KIT at 00:24

## 2021-01-01 RX ADMIN — TIMOLOL MALEATE 1 DROP: 5 SOLUTION/ DROPS OPHTHALMIC at 08:14

## 2021-01-01 RX ADMIN — METHYLPREDNISOLONE SODIUM SUCCINATE 60 MG: 125 INJECTION, POWDER, FOR SOLUTION INTRAMUSCULAR; INTRAVENOUS at 14:04

## 2021-01-01 RX ADMIN — Medication 2000 UNITS: at 08:30

## 2021-01-01 RX ADMIN — Medication 500 MG: at 20:00

## 2021-01-01 RX ADMIN — LATANOPROST 1 DROP: 50 SOLUTION OPHTHALMIC at 21:58

## 2021-01-01 RX ADMIN — CARVEDILOL 6.25 MG: 6.25 TABLET, FILM COATED ORAL at 08:53

## 2021-01-01 RX ADMIN — ACETAMINOPHEN 650 MG: 325 TABLET, FILM COATED ORAL at 18:46

## 2021-01-01 RX ADMIN — BRIMONIDINE TARTRATE 1 DROP: 2 SOLUTION OPHTHALMIC at 16:54

## 2021-01-01 RX ADMIN — PRAVASTATIN SODIUM 10 MG: 20 TABLET ORAL at 20:49

## 2021-01-01 RX ADMIN — VANCOMYCIN HYDROCHLORIDE 125 MG: KIT at 13:53

## 2021-01-01 RX ADMIN — TIMOLOL MALEATE 1 DROP: 5 SOLUTION/ DROPS OPHTHALMIC at 08:28

## 2021-01-01 RX ADMIN — Medication 100 MCG: at 10:02

## 2021-01-01 RX ADMIN — DORZOLAMIDE HYDROCHLORIDE 1 DROP: 20 SOLUTION/ DROPS OPHTHALMIC at 20:39

## 2021-01-01 RX ADMIN — INSULIN HUMAN 2 UNITS: 100 INJECTION, SOLUTION PARENTERAL at 23:19

## 2021-01-01 RX ADMIN — PRAVASTATIN SODIUM 10 MG: 20 TABLET ORAL at 21:55

## 2021-01-01 RX ADMIN — SERTRALINE HYDROCHLORIDE 25 MG: 25 TABLET ORAL at 08:53

## 2021-01-01 RX ADMIN — MIDODRINE HYDROCHLORIDE 5 MG: 2.5 TABLET ORAL at 12:46

## 2021-01-01 RX ADMIN — ASPIRIN 81 MG: 81 TABLET, COATED ORAL at 09:25

## 2021-01-01 RX ADMIN — SODIUM CHLORIDE, PRESERVATIVE FREE 10 ML: 5 INJECTION INTRAVENOUS at 20:39

## 2021-01-01 RX ADMIN — FLUTICASONE PROPIONATE 2 SPRAY: 50 SPRAY, METERED NASAL at 08:37

## 2021-01-01 RX ADMIN — FAMOTIDINE 20 MG: 20 TABLET, FILM COATED ORAL at 09:25

## 2021-01-01 RX ADMIN — METOCLOPRAMIDE 5 MG: 5 TABLET ORAL at 21:11

## 2021-01-01 RX ADMIN — SODIUM CHLORIDE, PRESERVATIVE FREE 10 ML: 5 INJECTION INTRAVENOUS at 08:11

## 2021-01-01 RX ADMIN — LOSARTAN POTASSIUM 50 MG: 50 TABLET, FILM COATED ORAL at 09:02

## 2021-01-01 RX ADMIN — INSULIN LISPRO 2 UNITS: 100 INJECTION, SOLUTION INTRAVENOUS; SUBCUTANEOUS at 08:40

## 2021-01-01 RX ADMIN — FAMOTIDINE 20 MG: 20 TABLET, FILM COATED ORAL at 15:07

## 2021-01-01 RX ADMIN — PREGABALIN 50 MG: 50 CAPSULE ORAL at 12:30

## 2021-01-01 RX ADMIN — SERTRALINE HYDROCHLORIDE 25 MG: 50 TABLET, FILM COATED ORAL at 13:54

## 2021-01-01 RX ADMIN — SERTRALINE HYDROCHLORIDE 25 MG: 25 TABLET ORAL at 09:21

## 2021-01-01 RX ADMIN — FLUTICASONE PROPIONATE 2 SPRAY: 50 SPRAY, METERED NASAL at 21:18

## 2021-01-01 RX ADMIN — SODIUM CHLORIDE, PRESERVATIVE FREE 10 ML: 5 INJECTION INTRAVENOUS at 20:05

## 2021-01-01 RX ADMIN — EPINEPHRINE 1 MG: 0.1 INJECTION INTRACARDIAC; INTRAVENOUS at 07:44

## 2021-01-01 RX ADMIN — INSULIN LISPRO 2 UNITS: 100 INJECTION, SOLUTION INTRAVENOUS; SUBCUTANEOUS at 11:27

## 2021-01-01 RX ADMIN — METHYLPREDNISOLONE SODIUM SUCCINATE 60 MG: 125 INJECTION, POWDER, FOR SOLUTION INTRAMUSCULAR; INTRAVENOUS at 03:32

## 2021-01-01 RX ADMIN — VANCOMYCIN HYDROCHLORIDE 125 MG: KIT at 11:52

## 2021-01-01 RX ADMIN — SODIUM CHLORIDE, PRESERVATIVE FREE 10 ML: 5 INJECTION INTRAVENOUS at 08:47

## 2021-01-01 RX ADMIN — SODIUM CHLORIDE 75 ML/HR: 9 INJECTION, SOLUTION INTRAVENOUS at 04:44

## 2021-01-01 RX ADMIN — MORPHINE SULFATE 0.5 MG: 2 INJECTION, SOLUTION INTRAMUSCULAR; INTRAVENOUS at 00:43

## 2021-01-01 RX ADMIN — BRIMONIDINE TARTRATE 1 DROP: 2 SOLUTION OPHTHALMIC at 08:47

## 2021-01-01 RX ADMIN — VANCOMYCIN HYDROCHLORIDE 125 MG: KIT at 06:39

## 2021-01-01 RX ADMIN — DORZOLAMIDE HYDROCHLORIDE 1 DROP: 20 SOLUTION/ DROPS OPHTHALMIC at 21:28

## 2021-01-01 RX ADMIN — FERRIC CITRATE 840 MG: 210 TABLET, COATED ORAL at 14:44

## 2021-01-01 RX ADMIN — FAMOTIDINE 20 MG: 20 TABLET, FILM COATED ORAL at 08:11

## 2021-01-01 RX ADMIN — DORZOLAMIDE HYDROCHLORIDE 1 DROP: 20 SOLUTION/ DROPS OPHTHALMIC at 21:05

## 2021-01-01 RX ADMIN — Medication 6 MG: at 00:22

## 2021-01-01 RX ADMIN — Medication 1 CAPSULE: at 08:53

## 2021-01-01 RX ADMIN — ENOXAPARIN SODIUM 30 MG: 30 INJECTION SUBCUTANEOUS at 09:03

## 2021-01-01 RX ADMIN — SODIUM CHLORIDE, PRESERVATIVE FREE 10 ML: 5 INJECTION INTRAVENOUS at 20:40

## 2021-01-01 RX ADMIN — DORZOLAMIDE HYDROCHLORIDE 1 DROP: 20 SOLUTION/ DROPS OPHTHALMIC at 17:44

## 2021-01-01 RX ADMIN — ACETAMINOPHEN 650 MG: 325 TABLET, FILM COATED ORAL at 02:53

## 2021-01-01 RX ADMIN — VANCOMYCIN HYDROCHLORIDE 125 MG: KIT at 18:49

## 2021-01-01 RX ADMIN — VANCOMYCIN HYDROCHLORIDE 125 MG: KIT at 13:54

## 2021-01-01 RX ADMIN — SODIUM CHLORIDE 50 ML/HR: 9 INJECTION, SOLUTION INTRAVENOUS at 05:16

## 2021-01-01 RX ADMIN — BRIMONIDINE TARTRATE 1 DROP: 2 SOLUTION OPHTHALMIC at 08:33

## 2021-01-01 RX ADMIN — Medication 1 CAPSULE: at 14:06

## 2021-01-01 RX ADMIN — Medication 2000 UNITS: at 15:07

## 2021-01-01 RX ADMIN — FLUTICASONE PROPIONATE 2 SPRAY: 50 SPRAY, METERED NASAL at 20:34

## 2021-01-01 RX ADMIN — INSULIN LISPRO 4 UNITS: 100 INJECTION, SOLUTION INTRAVENOUS; SUBCUTANEOUS at 17:07

## 2021-01-01 RX ADMIN — CHOLESTYRAMINE 1 PACKET: 4 POWDER, FOR SUSPENSION ORAL at 08:28

## 2021-01-01 RX ADMIN — ACETAMINOPHEN 650 MG: 325 TABLET, FILM COATED ORAL at 00:50

## 2021-01-01 RX ADMIN — VANCOMYCIN HYDROCHLORIDE 125 MG: KIT at 00:00

## 2021-01-01 RX ADMIN — VANCOMYCIN HYDROCHLORIDE 125 MG: KIT at 19:13

## 2021-01-01 RX ADMIN — FERRIC CITRATE 840 MG: 210 TABLET, COATED ORAL at 12:30

## 2021-01-01 RX ADMIN — ACETAMINOPHEN 650 MG: 325 TABLET, FILM COATED ORAL at 15:07

## 2021-01-01 RX ADMIN — Medication 1 CAPSULE: at 08:48

## 2021-01-01 RX ADMIN — FLUTICASONE PROPIONATE 2 SPRAY: 50 SPRAY, METERED NASAL at 20:24

## 2021-01-01 RX ADMIN — ENOXAPARIN SODIUM 30 MG: 30 INJECTION SUBCUTANEOUS at 08:29

## 2021-01-01 RX ADMIN — IPRATROPIUM BROMIDE AND ALBUTEROL SULFATE 3 ML: 2.5; .5 SOLUTION RESPIRATORY (INHALATION) at 15:10

## 2021-01-01 RX ADMIN — ATROPINE SULFATE 1 DROP: 10 SOLUTION/ DROPS OPHTHALMIC at 08:17

## 2021-01-01 RX ADMIN — ACETAMINOPHEN 1000 MG: 500 TABLET, FILM COATED ORAL at 09:33

## 2021-01-01 RX ADMIN — CARVEDILOL 6.25 MG: 6.25 TABLET, FILM COATED ORAL at 17:32

## 2021-01-01 RX ADMIN — Medication 2000 UNITS: at 09:02

## 2021-01-01 RX ADMIN — ATROPINE SULFATE 1 DROP: 10 SOLUTION/ DROPS OPHTHALMIC at 09:22

## 2021-01-01 RX ADMIN — BRIMONIDINE TARTRATE 1 DROP: 2 SOLUTION OPHTHALMIC at 08:38

## 2021-01-01 RX ADMIN — FERRIC CITRATE 840 MG: 210 TABLET, COATED ORAL at 09:20

## 2021-01-01 RX ADMIN — VANCOMYCIN HYDROCHLORIDE 125 MG: KIT at 02:54

## 2021-01-01 RX ADMIN — VALPROATE SODIUM 1000 MG: 100 INJECTION, SOLUTION INTRAVENOUS at 12:01

## 2021-01-01 RX ADMIN — ASPIRIN 81 MG: 81 TABLET, COATED ORAL at 08:29

## 2021-01-01 RX ADMIN — SERTRALINE HYDROCHLORIDE 25 MG: 25 TABLET ORAL at 08:11

## 2021-01-01 RX ADMIN — HEPARIN SODIUM 5000 UNITS: 5000 INJECTION INTRAVENOUS; SUBCUTANEOUS at 20:39

## 2021-01-01 RX ADMIN — METHYLPREDNISOLONE SODIUM SUCCINATE 60 MG: 125 INJECTION, POWDER, FOR SOLUTION INTRAMUSCULAR; INTRAVENOUS at 22:12

## 2021-01-01 RX ADMIN — IPRATROPIUM BROMIDE AND ALBUTEROL SULFATE 3 ML: 2.5; .5 SOLUTION RESPIRATORY (INHALATION) at 07:05

## 2021-01-01 RX ADMIN — LATANOPROST 1 DROP: 50 SOLUTION OPHTHALMIC at 20:03

## 2021-01-01 RX ADMIN — Medication 500 MG: at 21:10

## 2021-01-01 RX ADMIN — ASPIRIN 81 MG: 81 TABLET, COATED ORAL at 08:40

## 2021-01-01 RX ADMIN — LIDOCAINE HYDROCHLORIDE 20 ML: 10 INJECTION, SOLUTION INFILTRATION; PERINEURAL at 16:48

## 2021-01-01 RX ADMIN — Medication 1 PATCH: at 04:07

## 2021-01-01 RX ADMIN — Medication 6 MG: at 22:19

## 2021-01-01 RX ADMIN — DORZOLAMIDE HYDROCHLORIDE 1 DROP: 20 SOLUTION/ DROPS OPHTHALMIC at 08:34

## 2021-01-01 RX ADMIN — HYDROMORPHONE HYDROCHLORIDE 0.5 MG: 1 INJECTION, SOLUTION INTRAMUSCULAR; INTRAVENOUS; SUBCUTANEOUS at 02:50

## 2021-01-01 RX ADMIN — INSULIN LISPRO 2 UNITS: 100 INJECTION, SOLUTION INTRAVENOUS; SUBCUTANEOUS at 14:24

## 2021-01-01 RX ADMIN — BRIMONIDINE TARTRATE 1 DROP: 2 SOLUTION OPHTHALMIC at 17:07

## 2021-01-01 RX ADMIN — VANCOMYCIN HYDROCHLORIDE 125 MG: KIT at 17:05

## 2021-01-01 RX ADMIN — ACETAMINOPHEN 650 MG: 325 TABLET, FILM COATED ORAL at 08:40

## 2021-01-12 DIAGNOSIS — M79.604 LEG PAIN, BILATERAL: ICD-10-CM

## 2021-01-12 DIAGNOSIS — M79.605 LEG PAIN, BILATERAL: ICD-10-CM

## 2021-01-12 RX ORDER — PREGABALIN 50 MG/1
50 CAPSULE ORAL 2 TIMES DAILY
Qty: 60 CAPSULE | Refills: 1 | Status: SHIPPED | OUTPATIENT
Start: 2021-01-12 | End: 2021-03-19

## 2021-01-12 NOTE — TELEPHONE ENCOUNTER
Charli Garriosn called to request a refill on her medication. Last office visit : 11/24/2020   Next office visit : 2/24/2021     Last UDS:   Amphetamine Screen, Urine   Date Value Ref Range Status   09/01/2020 Negative Negative <1000 ng/mL Final     Benzodiazepine Screen, Urine   Date Value Ref Range Status   09/01/2020 Negative Negative <100 ng/mL Final     Cocaine Metabolite Screen, Urine   Date Value Ref Range Status   09/01/2020 Negative Negative <300 ng/mL Final     Opiate Scrn, Ur   Date Value Ref Range Status   09/01/2020 Negative Negative < 300 ng/mL Final       Last Richard Conor: new one pending  Medication Contract: [id number]   Last Fill: 11-    Requested Prescriptions     Pending Prescriptions Disp Refills    pregabalin (LYRICA) 50 MG capsule 60 capsule 1     Sig: Take 1 capsule by mouth 2 times daily for 30 days. Please approve or refuse this medication.    Leigh Samson LPN

## 2021-02-19 ENCOUNTER — APPOINTMENT (OUTPATIENT)
Dept: CT IMAGING | Age: 61
DRG: 291 | End: 2021-02-19
Payer: MEDICARE

## 2021-02-19 ENCOUNTER — APPOINTMENT (OUTPATIENT)
Dept: GENERAL RADIOLOGY | Age: 61
DRG: 291 | End: 2021-02-19
Payer: MEDICARE

## 2021-02-19 ENCOUNTER — HOSPITAL ENCOUNTER (INPATIENT)
Age: 61
LOS: 8 days | Discharge: HOME HEALTH | DRG: 291 | End: 2021-02-27
Attending: EMERGENCY MEDICINE | Admitting: INTERNAL MEDICINE
Payer: MEDICARE

## 2021-02-19 DIAGNOSIS — R74.8 ELEVATED LIPASE: ICD-10-CM

## 2021-02-19 DIAGNOSIS — J96.11 CHRONIC RESPIRATORY FAILURE WITH HYPOXIA (HCC): ICD-10-CM

## 2021-02-19 DIAGNOSIS — Z72.0 TOBACCO ABUSE: ICD-10-CM

## 2021-02-19 DIAGNOSIS — Z99.2 ESRD ON HEMODIALYSIS (HCC): Primary | ICD-10-CM

## 2021-02-19 DIAGNOSIS — R53.1 GENERAL WEAKNESS: ICD-10-CM

## 2021-02-19 DIAGNOSIS — R19.7 DIARRHEA, UNSPECIFIED TYPE: ICD-10-CM

## 2021-02-19 DIAGNOSIS — J90 PLEURAL EFFUSION: ICD-10-CM

## 2021-02-19 DIAGNOSIS — R53.83 OTHER FATIGUE: ICD-10-CM

## 2021-02-19 DIAGNOSIS — N18.6 ESRD ON HEMODIALYSIS (HCC): Primary | ICD-10-CM

## 2021-02-19 DIAGNOSIS — E16.2 HYPOGLYCEMIA: ICD-10-CM

## 2021-02-19 PROBLEM — D63.1 ANEMIA DUE TO CHRONIC KIDNEY DISEASE, ON CHRONIC DIALYSIS (HCC): Status: ACTIVE | Noted: 2019-12-06

## 2021-02-19 PROBLEM — Z91.158 NON-COMPLIANCE WITH RENAL DIALYSIS: Status: ACTIVE | Noted: 2019-12-06

## 2021-02-19 PROBLEM — Z91.15 NON-COMPLIANCE WITH RENAL DIALYSIS (HCC): Status: ACTIVE | Noted: 2019-12-06

## 2021-02-19 LAB
ADENOVIRUS BY PCR: NOT DETECTED
ALBUMIN SERPL-MCNC: 4.4 G/DL (ref 3.5–5.2)
ALP BLD-CCNC: 353 U/L (ref 35–104)
ALT SERPL-CCNC: 55 U/L (ref 5–33)
ANION GAP SERPL CALCULATED.3IONS-SCNC: 17 MMOL/L (ref 7–19)
AST SERPL-CCNC: 54 U/L (ref 5–32)
BASE EXCESS ARTERIAL: -1.2 MMOL/L (ref -2–2)
BASOPHILS ABSOLUTE: 0 K/UL (ref 0–0.2)
BASOPHILS RELATIVE PERCENT: 0.3 % (ref 0–1)
BILIRUB SERPL-MCNC: 0.4 MG/DL (ref 0.2–1.2)
BORDETELLA PARAPERTUSSIS BY PCR: NOT DETECTED
BORDETELLA PERTUSSIS BY PCR: NOT DETECTED
BUN BLDV-MCNC: 77 MG/DL (ref 8–23)
C-REACTIVE PROTEIN: 7.45 MG/DL (ref 0–0.5)
CALCIUM SERPL-MCNC: 9.7 MG/DL (ref 8.8–10.2)
CARBOXYHEMOGLOBIN ARTERIAL: 5.2 % (ref 0–5)
CHLAMYDOPHILIA PNEUMONIAE BY PCR: NOT DETECTED
CHLORIDE BLD-SCNC: 92 MMOL/L (ref 98–111)
CO2: 26 MMOL/L (ref 22–29)
CORONAVIRUS 229E BY PCR: NOT DETECTED
CORONAVIRUS HKU1 BY PCR: NOT DETECTED
CORONAVIRUS NL63 BY PCR: NOT DETECTED
CORONAVIRUS OC43 BY PCR: NOT DETECTED
CREAT SERPL-MCNC: 7.2 MG/DL (ref 0.5–0.9)
EOSINOPHILS ABSOLUTE: 0.2 K/UL (ref 0–0.6)
EOSINOPHILS RELATIVE PERCENT: 5.5 % (ref 0–5)
GFR AFRICAN AMERICAN: 7
GFR NON-AFRICAN AMERICAN: 6
GLUCOSE BLD-MCNC: 110 MG/DL (ref 70–99)
GLUCOSE BLD-MCNC: 154 MG/DL (ref 70–99)
GLUCOSE BLD-MCNC: 59 MG/DL (ref 70–99)
GLUCOSE BLD-MCNC: 67 MG/DL (ref 74–109)
GLUCOSE BLD-MCNC: 77 MG/DL (ref 70–99)
HBA1C MFR BLD: 6.7 % (ref 4–6)
HCO3 ARTERIAL: 24.8 MMOL/L (ref 22–26)
HCT VFR BLD CALC: 37.7 % (ref 37–47)
HEMOGLOBIN, ART, EXTENDED: 11.1 G/DL (ref 12–16)
HEMOGLOBIN: 11.9 G/DL (ref 12–16)
HUMAN METAPNEUMOVIRUS BY PCR: NOT DETECTED
HUMAN RHINOVIRUS/ENTEROVIRUS BY PCR: NOT DETECTED
IMMATURE GRANULOCYTES #: 0 K/UL
INFLUENZA A BY PCR: NOT DETECTED
INFLUENZA B BY PCR: NOT DETECTED
INR BLD: 1.22 (ref 0.88–1.18)
LACTIC ACID: 0.8 MMOL/L (ref 0.5–1.9)
LIPASE: 1492 U/L (ref 13–60)
LYMPHOCYTES ABSOLUTE: 0.5 K/UL (ref 1.1–4.5)
LYMPHOCYTES RELATIVE PERCENT: 13.2 % (ref 20–40)
MCH RBC QN AUTO: 31.1 PG (ref 27–31)
MCHC RBC AUTO-ENTMCNC: 31.6 G/DL (ref 33–37)
MCV RBC AUTO: 98.4 FL (ref 81–99)
METHEMOGLOBIN ARTERIAL: 0.3 %
MONOCYTES ABSOLUTE: 0.3 K/UL (ref 0–0.9)
MONOCYTES RELATIVE PERCENT: 8.8 % (ref 0–10)
MYCOPLASMA PNEUMONIAE BY PCR: NOT DETECTED
NEUTROPHILS ABSOLUTE: 2.6 K/UL (ref 1.5–7.5)
NEUTROPHILS RELATIVE PERCENT: 71.9 % (ref 50–65)
O2 CONTENT ARTERIAL: 14.3 ML/DL
O2 SAT, ARTERIAL: 90.9 %
O2 THERAPY: ABNORMAL
PARAINFLUENZA VIRUS 1 BY PCR: NOT DETECTED
PARAINFLUENZA VIRUS 2 BY PCR: NOT DETECTED
PARAINFLUENZA VIRUS 3 BY PCR: NOT DETECTED
PARAINFLUENZA VIRUS 4 BY PCR: NOT DETECTED
PCO2 ARTERIAL: 46 MMHG (ref 35–45)
PDW BLD-RTO: 18 % (ref 11.5–14.5)
PERFORMED ON: ABNORMAL
PERFORMED ON: NORMAL
PH ARTERIAL: 7.34 (ref 7.35–7.45)
PLATELET # BLD: 122 K/UL (ref 130–400)
PMV BLD AUTO: 10.8 FL (ref 9.4–12.3)
PO2 ARTERIAL: 72 MMHG (ref 80–100)
POTASSIUM SERPL-SCNC: 5.3 MMOL/L (ref 3.5–5)
POTASSIUM, WHOLE BLOOD: 5.1
PROCALCITONIN: 0.24 NG/ML (ref 0–0.09)
PROTHROMBIN TIME: 15.4 SEC (ref 12–14.6)
RBC # BLD: 3.83 M/UL (ref 4.2–5.4)
RESPIRATORY SYNCYTIAL VIRUS BY PCR: NOT DETECTED
SARS-COV-2, PCR: NOT DETECTED
SEDIMENTATION RATE, ERYTHROCYTE: 30 MM/HR (ref 0–25)
SODIUM BLD-SCNC: 135 MMOL/L (ref 136–145)
TOTAL PROTEIN: 7.6 G/DL (ref 6.6–8.7)
TRIGL SERPL-MCNC: 65 MG/DL (ref 0–149)
WBC # BLD: 3.7 K/UL (ref 4.8–10.8)

## 2021-02-19 PROCEDURE — 6360000004 HC RX CONTRAST MEDICATION: Performed by: EMERGENCY MEDICINE

## 2021-02-19 PROCEDURE — 93005 ELECTROCARDIOGRAM TRACING: CPT

## 2021-02-19 PROCEDURE — 84145 PROCALCITONIN (PCT): CPT

## 2021-02-19 PROCEDURE — 74177 CT ABD & PELVIS W/CONTRAST: CPT

## 2021-02-19 PROCEDURE — 0202U NFCT DS 22 TRGT SARS-COV-2: CPT

## 2021-02-19 PROCEDURE — 85610 PROTHROMBIN TIME: CPT

## 2021-02-19 PROCEDURE — 82803 BLOOD GASES ANY COMBINATION: CPT

## 2021-02-19 PROCEDURE — 96374 THER/PROPH/DIAG INJ IV PUSH: CPT

## 2021-02-19 PROCEDURE — 71260 CT THORAX DX C+: CPT

## 2021-02-19 PROCEDURE — 80053 COMPREHEN METABOLIC PANEL: CPT

## 2021-02-19 PROCEDURE — 84132 ASSAY OF SERUM POTASSIUM: CPT

## 2021-02-19 PROCEDURE — 86140 C-REACTIVE PROTEIN: CPT

## 2021-02-19 PROCEDURE — 2580000003 HC RX 258: Performed by: INTERNAL MEDICINE

## 2021-02-19 PROCEDURE — 70450 CT HEAD/BRAIN W/O DYE: CPT

## 2021-02-19 PROCEDURE — 87040 BLOOD CULTURE FOR BACTERIA: CPT

## 2021-02-19 PROCEDURE — 84478 ASSAY OF TRIGLYCERIDES: CPT

## 2021-02-19 PROCEDURE — 71045 X-RAY EXAM CHEST 1 VIEW: CPT

## 2021-02-19 PROCEDURE — 99282 EMERGENCY DEPT VISIT SF MDM: CPT

## 2021-02-19 PROCEDURE — 83036 HEMOGLOBIN GLYCOSYLATED A1C: CPT

## 2021-02-19 PROCEDURE — 85652 RBC SED RATE AUTOMATED: CPT

## 2021-02-19 PROCEDURE — 2580000003 HC RX 258: Performed by: EMERGENCY MEDICINE

## 2021-02-19 PROCEDURE — 83690 ASSAY OF LIPASE: CPT

## 2021-02-19 PROCEDURE — 36415 COLL VENOUS BLD VENIPUNCTURE: CPT

## 2021-02-19 PROCEDURE — 8010000000 HC HEMODIALYSIS ACUTE INPT

## 2021-02-19 PROCEDURE — 36600 WITHDRAWAL OF ARTERIAL BLOOD: CPT

## 2021-02-19 PROCEDURE — 83605 ASSAY OF LACTIC ACID: CPT

## 2021-02-19 PROCEDURE — 1210000000 HC MED SURG R&B

## 2021-02-19 PROCEDURE — 85025 COMPLETE CBC W/AUTO DIFF WBC: CPT

## 2021-02-19 PROCEDURE — 82947 ASSAY GLUCOSE BLOOD QUANT: CPT

## 2021-02-19 RX ORDER — SODIUM CHLORIDE 0.9 % (FLUSH) 0.9 %
10 SYRINGE (ML) INJECTION PRN
Status: DISCONTINUED | OUTPATIENT
Start: 2021-02-19 | End: 2021-02-22 | Stop reason: SDUPTHER

## 2021-02-19 RX ORDER — POTASSIUM CHLORIDE 20 MEQ/1
40 TABLET, EXTENDED RELEASE ORAL PRN
Status: DISCONTINUED | OUTPATIENT
Start: 2021-02-19 | End: 2021-02-27 | Stop reason: HOSPADM

## 2021-02-19 RX ORDER — ACETAMINOPHEN 650 MG/1
650 SUPPOSITORY RECTAL EVERY 6 HOURS PRN
Status: DISCONTINUED | OUTPATIENT
Start: 2021-02-19 | End: 2021-02-19

## 2021-02-19 RX ORDER — ONDANSETRON 2 MG/ML
4 INJECTION INTRAMUSCULAR; INTRAVENOUS EVERY 6 HOURS PRN
Status: DISCONTINUED | OUTPATIENT
Start: 2021-02-19 | End: 2021-02-27 | Stop reason: HOSPADM

## 2021-02-19 RX ORDER — NICOTINE POLACRILEX 4 MG
15 LOZENGE BUCCAL PRN
Status: DISCONTINUED | OUTPATIENT
Start: 2021-02-19 | End: 2021-02-27 | Stop reason: HOSPADM

## 2021-02-19 RX ORDER — IPRATROPIUM BROMIDE AND ALBUTEROL SULFATE 2.5; .5 MG/3ML; MG/3ML
1 SOLUTION RESPIRATORY (INHALATION)
Status: DISCONTINUED | OUTPATIENT
Start: 2021-02-19 | End: 2021-02-27 | Stop reason: HOSPADM

## 2021-02-19 RX ORDER — HEPARIN SODIUM 5000 [USP'U]/ML
5000 INJECTION, SOLUTION INTRAVENOUS; SUBCUTANEOUS EVERY 8 HOURS SCHEDULED
Status: DISCONTINUED | OUTPATIENT
Start: 2021-02-19 | End: 2021-02-27 | Stop reason: HOSPADM

## 2021-02-19 RX ORDER — ASPIRIN 81 MG/1
81 TABLET ORAL DAILY
Status: DISCONTINUED | OUTPATIENT
Start: 2021-02-19 | End: 2021-02-27 | Stop reason: HOSPADM

## 2021-02-19 RX ORDER — POTASSIUM CHLORIDE 7.45 MG/ML
10 INJECTION INTRAVENOUS PRN
Status: DISCONTINUED | OUTPATIENT
Start: 2021-02-19 | End: 2021-02-27 | Stop reason: HOSPADM

## 2021-02-19 RX ORDER — FAMOTIDINE 20 MG/1
20 TABLET, FILM COATED ORAL 2 TIMES DAILY
Status: DISCONTINUED | OUTPATIENT
Start: 2021-02-19 | End: 2021-02-20

## 2021-02-19 RX ORDER — DEXTROSE MONOHYDRATE 50 MG/ML
100 INJECTION, SOLUTION INTRAVENOUS PRN
Status: DISCONTINUED | OUTPATIENT
Start: 2021-02-19 | End: 2021-02-27 | Stop reason: HOSPADM

## 2021-02-19 RX ORDER — SODIUM CHLORIDE 0.9 % (FLUSH) 0.9 %
10 SYRINGE (ML) INJECTION PRN
Status: DISCONTINUED | OUTPATIENT
Start: 2021-02-19 | End: 2021-02-27 | Stop reason: HOSPADM

## 2021-02-19 RX ORDER — DEXTROSE MONOHYDRATE 25 G/50ML
25 INJECTION, SOLUTION INTRAVENOUS ONCE
Status: COMPLETED | OUTPATIENT
Start: 2021-02-19 | End: 2021-02-19

## 2021-02-19 RX ORDER — ACETAMINOPHEN 325 MG/1
650 TABLET ORAL EVERY 6 HOURS PRN
Status: DISCONTINUED | OUTPATIENT
Start: 2021-02-19 | End: 2021-02-19

## 2021-02-19 RX ORDER — SODIUM CHLORIDE 0.9 % (FLUSH) 0.9 %
10 SYRINGE (ML) INJECTION EVERY 12 HOURS SCHEDULED
Status: DISCONTINUED | OUTPATIENT
Start: 2021-02-19 | End: 2021-02-27 | Stop reason: HOSPADM

## 2021-02-19 RX ORDER — SEVELAMER CARBONATE 800 MG/1
800 TABLET, FILM COATED ORAL DAILY
Status: DISCONTINUED | OUTPATIENT
Start: 2021-02-20 | End: 2021-02-27 | Stop reason: HOSPADM

## 2021-02-19 RX ORDER — PROMETHAZINE HYDROCHLORIDE 25 MG/1
12.5 TABLET ORAL EVERY 6 HOURS PRN
Status: DISCONTINUED | OUTPATIENT
Start: 2021-02-19 | End: 2021-02-27 | Stop reason: HOSPADM

## 2021-02-19 RX ORDER — SODIUM CHLORIDE 0.9 % (FLUSH) 0.9 %
10 SYRINGE (ML) INJECTION EVERY 12 HOURS SCHEDULED
Status: DISCONTINUED | OUTPATIENT
Start: 2021-02-19 | End: 2021-02-22 | Stop reason: SDUPTHER

## 2021-02-19 RX ORDER — DEXTROSE MONOHYDRATE 25 G/50ML
12.5 INJECTION, SOLUTION INTRAVENOUS PRN
Status: DISCONTINUED | OUTPATIENT
Start: 2021-02-19 | End: 2021-02-27 | Stop reason: HOSPADM

## 2021-02-19 RX ADMIN — DEXTROSE MONOHYDRATE 12.5 G: 25 INJECTION, SOLUTION INTRAVENOUS at 21:10

## 2021-02-19 RX ADMIN — DEXTROSE MONOHYDRATE 25 G: 25 INJECTION, SOLUTION INTRAVENOUS at 13:01

## 2021-02-19 RX ADMIN — IOPAMIDOL 90 ML: 755 INJECTION, SOLUTION INTRAVENOUS at 14:30

## 2021-02-19 NOTE — ED NOTES
Bed: 08  Expected date:   Expected time:   Means of arrival:   Comments:  ems     Nikkie Glass RN  02/19/21 1044

## 2021-02-19 NOTE — ED PROVIDER NOTES
[facility]  eMERGENCY dEPARTMENT eNCOUnter      Pt Name: Kieran Null  MRN: [id number]  [place] 1960  Date of evaluation: 2/19/2021  Provider: Jesi Mercedes MD    CHIEF COMPLAINT       Chief Complaint   Patient presents with    Diarrhea     pt missed dialysis on Thursday.  Failure To Thrive         HISTORY OF PRESENT ILLNESS   (Location/Symptom, Timing/Onset,Context/Setting, Quality, Duration, Modifying Factors, Severity)  Note limiting factors. Kieran Null is a 56 y.o. female who presents to the emergency department with lethargy and weakness. The patient had been okay up until this morning where she rolled over and said she had pain in her back. She lives with her  at home. She is a full code. Patient missed dialysis on Thursday because she was embarrassed that she been having a lot of diarrhea. She has not had any diarrhea in the ER thus far. Patient chronically wears oxygen. She is short of breath mildly. She has no known Covid exposure she is somewhat confused and weak appearing on arrival.  History is very limited much obtained from the . Patient usually bathes dresses and does all her activities of daily living alone. Today she had a major decline. There was no focal weakness it was more general malaise and or pain and or illness. She has not had dialysis since Tuesday. The history is provided by the patient. The history is limited by the condition of the patient. NursingNotes were reviewed.     REVIEW OF SYSTEMS    (2-9 systems for level 4, 10 or more for level 5)     Review of Systems   Unable to perform ROS: Mental status change         PAST MEDICAL HISTORY     Past Medical History:   Diagnosis Date    Blind right eye     partial vision loss in the L eye too, due to DM    Blindness of one eye     Right     CHF (congestive heart failure) (HCC)     CKD (chronic kidney disease), stage IV (HCC)     secondary to diabetic nephropathy  Diabetes ([address]     Diabetes mellitus with ophthalmic manifestations     dx'd in 2000 but likely ongoing before that, was dx'd when she went blind in the R eye    Glaucoma     Hemodialysis patient [facility])     dialysis tues, thurs, sat[address], [facility]    Hypertension     2000, dx'd at same time as the DM    Obesity     Renal osteodystrophy     Smoker     Type II or unspecified type diabetes mellitus with renal manifestations, uncontrolled(250.42)          SURGICAL HISTORY       Past Surgical History:   Procedure Laterality Date    CARPAL TUNNEL RELEASE     [address], and 1979    CHOLECYSTECTOMY      COLONOSCOPY N/A 3/9/2018    Dr ReevesDekuam-Tfitvtpztrsgkp-Erzvdhsphmgkm AP (-) dysplasia x 1, tubular AP x  (-) dysplasia x 1, HP x 4--1 yr recall    DIALYSIS FISTULA CREATION Left 4/23/15 [facility]    Left proximal ulnar artery to cephalic vein AVF creation    EYE SURGERY      laser, several times     TYMPANOSTOMY TUBE PLACEMENT Bilateral     VASCULAR SURGERY Left 5/11/15 S    US-guided cannulation left distal upper arm cephalic vein with 4-Czech glide sheath; LUE AV f'grams with venography SVC; left cephalic vein BAM with [id number] julieth balloon; completion venogram LUE    VASCULAR SURGERY Left 5/28/15 S    Percutaneous cannulation left distal radial artery with 4-Czech glide sheath; LUE AV f'grams with venography SVC; left cephalic vein balloon a'plasty with [id number] julieth balloon and [id number] julieth balloon; proximal and mid upper arm cephalic vein BAM with [id number] julieth balloon; completion venogram LUE    VASCULAR SURGERY Left 6/15/15 S    US-guided cannulation left cephalic vein with 4-Czech and later 7-Czech sheath; LUE AV f'grams including venography SVC; left cephalic vein stenosis balloon a'plasty with [id number] cutting balloon; completion f'gram and venogram LUE    VASCULAR SURGERY  05/15/2017  Years of education: Not on file    Highest education level: Not on file   Occupational History    Not on file   Social Needs    Financial resource strain: Not on file    Food insecurity     Worry: Not on file     Inability: Not on file    Transportation needs     Medical: Not on file     Non-medical: Not on file   Tobacco Use    Smoking status: Current Every Day Smoker     Packs/day: 1.00     Years: 40.00     Pack years: 40.00    Smokeless tobacco: Never Used   Substance and Sexual Activity    Alcohol use: No     Alcohol/week: 0.0 standard drinks    Drug use: Yes     Types: Marijuana    Sexual activity: Not Currently     Partners: Male   Lifestyle    Physical activity     Days per week: Not on file     Minutes per session: Not on file    Stress: Not on file   Relationships    Social connections     Talks on phone: Not on file     Gets together: Not on file     Attends [employer] service: Not on file     Active member of club or organization: Not on file     Attends meetings of clubs or organizations: Not on file     Relationship status: Not on file    Intimate partner violence     Fear of current or ex partner: Not on file     Emotionally abused: Not on file     Physically abused: Not on file     Forced sexual activity: Not on file   Other Topics Concern    Not on file   Social History Narrative    Not on file       SCREENINGS             PHYSICAL EXAM    (up to 7 for level 4, 8 or more for level 5)     ED Triage Vitals [02/19/21 1051]   BP Temp Temp Source Pulse Resp SpO2 Height Weight   129/68 98.2 °F (36.8 °C) Oral 66 16 100 % -- --       Physical Exam  Vitals signs and nursing note reviewed. Constitutional:       Appearance: She is ill-appearing. Comments: Lethargic weak ill-appearing laying in bed she will answer question but she seems somewhat sedate   HENT:      Head: Normocephalic and atraumatic. Neck:      Musculoskeletal: Normal range of motion and neck supple.    Cardiovascular: Rate and Rhythm: Normal rate and regular rhythm. Pulmonary:      Effort: No respiratory distress. Breath sounds: Wheezing present. Comments: Decreased breath sounds  Abdominal:      Tenderness: There is no abdominal tenderness. There is no guarding. Musculoskeletal:         General: No deformity or signs of injury. Skin:     General: Skin is warm and dry. Capillary Refill: Capillary refill takes less than 2 seconds. Neurological:      General: No focal deficit present. Mental Status: She is disoriented. Psychiatric:      Comments: Mildly sedated         DIAGNOSTIC RESULTS     EKG: All EKG's are interpreted by the Emergency Department Physician who either signs or Co-signs this chart in the absence of a cardiologist.        RADIOLOGY:   Non-plain film images such as CT, Ultrasound and MRI are read by the radiologist. [place] images are visualized and preliminarily interpreted by the emergency physician with the below findings:        Interpretation per the Radiologist below, if available at the time of this note:    CT HEAD WO CONTRAST   Final Result   No acute intracranial abnormality. Signed by Dr Kaiser Whitt on 2/19/2021 2:38 PM      XR CHEST PORTABLE   Final Result   1. Interstitial edema with large layering right pleural effusion.    Signed by Dr Raúl Orlando on 2/19/2021 11:44 AM      CT CHEST W CONTRAST    (Results Pending)   CT ABDOMEN PELVIS W IV CONTRAST Additional Contrast? None    (Results Pending)         ED BEDSIDE ULTRASOUND:   Performed by ED Physician - none    LABS:  Labs Reviewed   BLOOD GAS, ARTERIAL - Abnormal; Notable for the following components:       Result Value    pH, Arterial 7.340 (*)     pCO2, Arterial 46.0 (*)     pO2, Arterial 72.0 (*)     Hemoglobin, Art, Extended 11.1 (*)     Carboxyhgb, Arterial 5.2 (*)     All other components within normal limits   COMPREHENSIVE METABOLIC PANEL - Abnormal; Notable for the following components: Sodium 135 (*)     Potassium 5.3 (*)     Chloride 92 (*)     Glucose 67 (*)     BUN 77 (*)     CREATININE 7.2 (*)     GFR Non- 6 (*)     GFR African American 7 (*)     Alkaline Phosphatase 353 (*)     ALT 55 (*)     AST 54 (*)     All other components within normal limits   CBC WITH AUTO DIFFERENTIAL - Abnormal; Notable for the following components:    WBC 3.7 (*)     RBC 3.83 (*)     Hemoglobin 11.9 (*)     MCH 31.1 (*)     MCHC 31.6 (*)     RDW 18.0 (*)     Platelets 589 (*)     Neutrophils % 71.9 (*)     Lymphocytes % 13.2 (*)     Eosinophils % 5.5 (*)     Lymphocytes Absolute 0.5 (*)     All other components within normal limits   LIPASE - Abnormal; Notable for the following components:    Lipase 1,492 (*)     All other components within normal limits   PROTIME-INR - Abnormal; Notable for the following components:    Protime 15.4 (*)     INR 1.22 (*)     All other components within normal limits   SEDIMENTATION RATE - Abnormal; Notable for the following components:    Sed Rate 30 (*)     All other components within normal limits   C-REACTIVE PROTEIN - Abnormal; Notable for the following components:    CRP 7.45 (*)     All other components within normal limits   POCT GLUCOSE - Abnormal; Notable for the following components:    POC Glucose 110 (*)     All other components within normal limits   RESPIRATORY PANEL, MOLECULAR, WITH COVID-19   CULTURE, BLOOD 1   CULTURE, BLOOD 2   POTASSIUM, WHOLE BLOOD   LACTIC ACID, PLASMA   URINE RT REFLEX TO CULTURE   MISCELLANEOUS SENDOUT 1   POCT GLUCOSE       All other labs were within normal range or not returned as of this dictation.     EMERGENCY DEPARTMENT COURSE and DIFFERENTIALDIAGNOSIS/MDM:   Vitals:    Vitals:    02/19/21 1051 02/19/21 1134   BP: 129/68    Pulse: 66    Resp: 16 19   Temp: 98.2 °F (36.8 °C)    TempSrc: Oral    SpO2: 100% 98%       MDM  Number of Diagnoses or Management Options  Chronic respiratory failure with hypoxia (HCC) Diarrhea, unspecified type  Elevated lipase  ESRD on hemodialysis (HCC)  General weakness  Hypoglycemia  Other fatigue  Pleural effusion  Tobacco abuse  Diagnosis management comments: Patient presents with diarrhea based on history. She is not able to give a stool sample here yet. I have ordered stool studies I think we definitely need to check this. Patient is somewhat confused head CT was negative acute ABG did not show retention she is chronically on oxygen. I did a CT of her chest, pelvis some of the results are still pending her chest x-ray was markedly abnormal.  I did give her some D50 as well she had some hypoglycemia. I think this is likely multifactorial the way she looks today we need to check her stool whenever she gives a sample. I have ordered the test.  I discussed with Dr. Deysi Olivas for admission. Otherwise the patient needs to be admitted to the hospital she needs dialysis as well I placed a nephrology consult. I am unclear on why her lipase is still elevated. Most of the history is obtained from her . Otherwise patient is stable for admission at this time. Amount and/or Complexity of Data Reviewed  Clinical lab tests: ordered and reviewed  Tests in the radiology section of CPT®: ordered and reviewed  Decide to obtain previous medical records or to obtain history from someone other than the patient: yes  Obtain history from someone other than the patient: yes  Discuss the patient with other providers: yes    Patient Progress  Patient progress: stable        CONSULTS:  IP CONSULT TO NEPHROLOGY    PROCEDURES:  Unless otherwise notedbelow, none     Procedures    FINAL IMPRESSION     1. ESRD on hemodialysis ([facility] [address]    2. Diarrhea, unspecified type    3. Other fatigue    4. General weakness    5. Elevated lipase    6. Hypoglycemia    7. Tobacco abuse    8. Chronic respiratory failure with hypoxia (HCC)    9.  Pleural effusion          DISPOSITION/PLAN   DISPOSITION  admit PATIENT REFERRED TO:  No follow-up provider specified.     DISCHARGE MEDICATIONS:  New Prescriptions    No medications on file          (Please note that portions of this note were completed with a voice recognition program.  Efforts were made to edit the dictations butoccasionally words are mis-transcribed.)    Ernestine Hood MD (electronically signed)  AttendingEmergency Physician         Ernestine Hood MD  02/21/21 [id number]

## 2021-02-19 NOTE — CARE COORDINATION
MOISES called Lea Villa. Lea Villa stated he has been dating pt for 22 years. Lea Villa informed SW pt has been able to made her own food, bathe, and dress by herself up until this morning. At 4:30 this morning pt informed Lea Villa that she was hurting. Pt stated he thinks this is because she missed dialysis yesterday. However, pt has missed before and this hasn't happened. MOISES asked Lea Villa why pt missed dialysis. Lea Villa stated pt has been having horrible diarrhea. Before her appointment, pt had an accident and was afraid this would happen during dialyses. Lea Villa informed SW pt never misses taking her medications. Lea Villa stated he gives them to pt each morning and night.

## 2021-02-19 NOTE — H&P
[facility]      History & Physical    [id number]  PCP: RAMONA Davis    Date of Admission:2021    Patient:  Artem Malone  MRN: [id number]    Date of Service: Pt seen/examined on 2021 and Admitted to Inpatient with expected LOS greater than two midnights due to medical therapy. CHIEF COMPLAINT:    Chief Complaint   Patient presents with    Diarrhea     pt missed dialysis on Thursday.  Failure To Thrive       History Obtained From:  electronic medical record  Primary Care Physician: RAMONA Davis    HISTORY OF PRESENT ILLNESS:    Ms. Sammy Francisco a 56 y.o. female with a history of DMT2, ESRD, on HD, CHF, presenting to [address] [place] [address] ED (2021) on account of diarrhea, with an associated abdominal/back pain, generalized weakness and lethargy. Patient reportedly has missed 1 session of HD, due to the symptoms noted above. Patient reportedly is on baseline home oxygen. Initial work-up significant for;  AB.3 4/46/72/20 4.8  Hyperkalemia, with a potassium of 5.3  Initial BUN/creatinine of 77/7.2  Hypoglycemia with initial glucose of 67  Leukopenia, with WBC of 3.7  Lactic acid within lab reference range: 0.8  Markedly elevated lipase: 1,492    Mildly elevated ESR: 30 mm/hr  Elevated CRP: 7.45  Molecular respiratory panel with COVID-19 grossly negative    Blood cultures x2 sets pending    Patient admitted for further work-up and management. Of note  Limited history, given patient's mental status. Patient able to tell writer her name. Continues to grunt and moan, however with no specific complaint, when asked, if she has any other pain besides abdomen and back. Ela Linn        PAST MEDICAL & SURGICAL HISTORY    Past Medical History:      Diagnosis Date    Blind right eye     partial vision loss in the L eye too, due to DM    Blindness of one eye     Right     CHF (congestive heart failure) (HCC)     CKD (chronic kidney disease), stage IV ([facility] [address] secondary to diabetic nephropathy    Diabetes ([facility] [address]     Diabetes mellitus with ophthalmic manifestations     dx'd in 2000 but likely ongoing before that, was dx'd when she went blind in the R eye    Glaucoma     Hemodialysis patient [facility])     dialysis tues, [place], [address], [facility]    Hypertension     2000, dx'd at same time as the DM    Obesity     Renal osteodystrophy     Smoker     Type II or unspecified type diabetes mellitus with renal manifestations, uncontrolled(250.42)          Past Surgical History:      Procedure Laterality Date    CARPAL TUNNEL RELEASE      CATARACT REMOVAL     [address], and 1979    CHOLECYSTECTOMY      COLONOSCOPY N/A 3/9/2018    Dr ReevesIjjqyz-Rmlsjgqjqynkek-Blylonusqlhkx AP (-) dysplasia x 1, tubular AP x  (-) dysplasia x 1, HP x 4--1 yr recall    DIALYSIS FISTULA CREATION Left 4/23/15 [facility]    Left proximal ulnar artery to cephalic vein AVF creation    EYE SURGERY      laser, several times     TYMPANOSTOMY TUBE PLACEMENT Bilateral     VASCULAR SURGERY Left 5/11/15 [facility]    US-guided cannulation left distal upper arm cephalic vein with 4-Nigerian glide sheath; LUE AV f'grams with venography SVC; left cephalic vein BAM with [id number] julieth balloon; completion venogram LUE    VASCULAR SURGERY Left 5/28/15 [facility]    Percutaneous cannulation left distal radial artery with 4-Nigerian glide sheath; LUE AV f'grams with venography SVC; left cephalic vein balloon a'plasty with [id number] julieth balloon and [id number] julieth balloon; proximal and mid upper arm cephalic vein BAM with [id number] julieth balloon; completion venogram LUE    VASCULAR SURGERY Left 6/15/15 [facility]    US-guided cannulation left cephalic vein with 4-Nigerian and later 7-Nigerian sheath; LUE AV f'grams including venography SVC; left cephalic vein stenosis balloon a'plasty with [id number] cutting balloon; completion f'gram and venogram LUE    VASCULAR SURGERY  05/15/2017 [facility].Left upper fistulograms/venograms.  VASCULAR SURGERY  02/14/2018    [facility]. Left upper fistulograms, left subclavian BA 12x40 atlas, left cephalic arch BA [id number] conquest.    VASCULAR SURGERY  02/13/2019    [facility]. Left upper fistulograms,BA left SCV 8x40 conquest,stent left SCV, viabahn 13x50,left SCV stent BA 12x40 conquest.          SOCIAL & FAMILY HISTORY:    Social History:   TOBACCO:   reports that she has been smoking. She has a 40.00 pack-year smoking history. She has never used smokeless tobacco.  ETOH:   reports no history of alcohol use. Family History:       Problem Relation Age of Onset    Heart Disease Mother     Heart Attack Mother     Glaucoma Sister     Diabetes Sister     Diabetes Brother     Kidney Disease Brother     Blindness Brother     Stroke Maternal Grandmother     Stroke Maternal Grandfather     Colon Cancer Neg Hx     Colon Polyps Neg Hx     Liver Cancer Neg Hx     Liver Disease Neg Hx     Esophageal Cancer Neg Hx     Rectal Cancer Neg Hx     Stomach Cancer Neg Hx         MEDICATIONS:    Medications Prior to Admission:    Prior to Admission medications    Medication Sig Start Date End Date Taking? Authorizing Provider   pregabalin (LYRICA) 50 MG capsule Take 1 capsule by mouth 2 times daily for 30 days.  1/12/21 2/11/21  RAMONA Calvert   losartan (COZAAR) 50 MG tablet TAKE 1 TABLET BY MOUTH TWICE DAILY 12/24/20   RAMONA Calvert   albuterol sulfate HFA (PROVENTIL HFA) 108 (90 Base) MCG/ACT inhaler Inhale 2 puffs into the lungs every 6 hours as needed for Wheezing 10/16/20   RAMONA Rashid - NP   famotidine (PEPCID) 20 MG tablet TAKE 1 TABLET BY MOUTH TWICE DAILY 8/10/20   RAMONA Calvert   insulin glargine (LANTUS SOLOSTAR) 100 UNIT/ML injection pen INJECT 10 UNITS INTO THE SKIN ONCE DAILY 7/4/20   RAMONA Calvert   pravastatin (PRAVACHOL) 10 MG tablet TAKE 1 TABLET BY MOUTH EVERY NIGHT AT BEDTIME 6/29/20   RAMONA Calvert ondansetron (ZOFRAN) 4 MG tablet Take 4 mg by mouth every 6 hours    Historical Provider, MD   ipratropium-albuterol (DUONEB) 0.5-2.5 (3) MG/3ML SOLN nebulizer solution Inhale 3 mLs into the lungs every 4 hours 5/20/20   RAMONA Pool   dicyclomine (BENTYL) 10 MG capsule Take 1 capsule by mouth 4 times daily 4/14/20   RAMONA Pool   B-D ULTRAFINE III SHORT PEN 31G X 8 MM MISC USE ONCE DAILY TO INJECT INSULIN DAILY 3/16/20   RAMONA Pool   carvedilol (COREG) 12.5 MG tablet Take 12.5 mg by mouth 2 times daily (with meals)    Historical Provider, MD   lactulose (CHRONULAC) 10 GM/15ML solution TAKE 30 ML BY MOUTH THREE TIMES DAILY 9/25/19   RAMONA Pool   Cholecalciferol (VITAMIN D3) 1000 units CAPS Take by mouth    Historical Provider, MD   blood glucose monitor kit and supplies 1 kit by Other route 3 times daily Test three times a day & as needed for symptoms of irregular blood glucose. 7/11/18 10/16/20  RAMONA Olivas   Travoprost, BAK Free, (TRAVATAN Z) 0.004 % SOLN ophthalmic solution 1 drop nightly    Historical Provider, MD   midodrine (PROAMATINE) 5 MG tablet TK 1 T PO HALF WAY THROUGH TREATMENT AS DIRECTED 10/17/17   Historical Provider, MD   sevelamer (RENVELA) 800 MG tablet Take 1 tablet by mouth daily     Historical Provider, MD   aspirin EC 81 MG EC tablet Take 81 mg by mouth daily    Historical Provider, MD   fluticasone (FLONASE) 50 MCG/ACT nasal spray 2 sprays by Nasal route daily. To keep ears opened up 3/27/14   Sammi Crawford MD   Blood Glucose Monitoring Suppl NIKOS by Does not apply route. Pt will also need new lancet device if not included in kit. 12/9/13   Sammi Crawford MD   Glucose Blood (BLOOD GLUCOSE TEST STRIPS) STRP Check blood sugar twice a day for recent medication adjustments. 12/9/13   Sammi Crawford MD   bimatoprost 0.01 % SOLN Apply 1 drop to eye nightly.  One drop in left eye at     Historical Provider, MD Insulin Syringe-Needle U-100 (ACCUSURE INS SYR 1CC/31GX5/16\") 31G X 5/16\" 1 ML MISC by Does not apply route. 9/25/12   Sierra Alexander MD   timolol (TIMOPTIC-XR) 0.5 % ophthalmic gel-forming Place 1 drop into the left eye 2 times daily  7/3/12   Historical Provider, MD   dorzolamide (TRUSOPT) 2 % ophthalmic solution Place 1 drop into the left eye 2 times daily     Historical Provider, MD   brimonidine (ALPHAGAN P) 0.15 % ophthalmic solution Place 1 drop into the left eye 3 times daily     Historical Provider, MD   homatropine 5 % ophthalmic solution Place 1 drop into the left eye daily     Historical Provider, MD        ALLERGIES:    Allergies:  Bupropion, Gabapentin, Sulfa antibiotics, Varenicline, Wellbutrin [bupropion hcl], Azithromycin, Levaquin [levofloxacin], and Penicillins       REVIEW OF SYSTEMS :    Review of Systems   Unable to perform ROS: Mental status change          PHYSICAL EXAM:    Physical Exam:    Vitals:   /82   Pulse 68   Temp 96.6 °F (35.9 °C)   Resp 18   SpO2 96%     Physical Exam  Vitals signs and nursing note reviewed. Constitutional:       General: She is not in acute distress. Appearance: She is obese. She is ill-appearing. She is not toxic-appearing or diaphoretic. Comments: Nonspecific moaning and grunting. HENT:      Head: Normocephalic and atraumatic. Right Ear: External ear normal.      Left Ear: External ear normal.      Nose: Nose normal. No congestion or rhinorrhea. Eyes:      General: No scleral icterus. Right eye: No discharge. Left eye: No discharge. Extraocular Movements: Extraocular movements intact. Conjunctiva/sclera: Conjunctivae normal.      Pupils: Pupils are equal, round, and reactive to light. Neck:      Musculoskeletal: Normal range of motion and neck supple. No neck rigidity or muscular tenderness. Vascular: No carotid bruit. Cardiovascular:      Rate and Rhythm: Normal rate and regular rhythm. Pulses: Normal pulses. Heart sounds: Normal heart sounds. No murmur. No friction rub. No gallop. Pulmonary:      Effort: Pulmonary effort is normal. No respiratory distress. Breath sounds: Normal breath sounds. No stridor. No rhonchi or rales. Comments: Some audible wheezes in upper airway. Chest:      Chest wall: No tenderness. Abdominal:      General: Bowel sounds are normal. There is no distension. Palpations: Abdomen is soft. Tenderness: There is abdominal tenderness ( Diffuse tenderness to palpation. No guarding, rigidity, rebound tenderness noted/elicited. ). There is no guarding or rebound. Musculoskeletal: Normal range of motion. General: No swelling, tenderness, deformity or signs of injury. Right lower leg: Edema present. Left lower leg: Edema present. Skin:     General: Skin is warm and dry. Capillary Refill: Capillary refill takes less than 2 seconds. Coloration: Skin is not jaundiced or pale. Findings: No bruising, erythema, lesion or rash. Neurological:      General: No focal deficit present. Mental Status: She is alert. She is disoriented. Cranial Nerves: No cranial nerve deficit. Sensory: No sensory deficit. Motor: No weakness. Coordination: Coordination normal.      Comments: AO x1. Psychiatric:         Mood and Affect: Mood normal.         Behavior: Behavior normal.               DIAGNOSTIC STUDIES:    I have reviewedLaboratory and Imaging data report today. Recent Labs     02/19/21  1154   WBC 3.7*   HGB 11.9*   *     Recent Labs     02/19/21  1131 02/19/21  1154   NA  --  135*   K 5.1 5.3*   CL  --  92*   CO2  --  26   BUN  --  77*   CREATININE  --  7.2*   GLUCOSE  --  67*   AST  --  54*   ALT  --  55*   BILITOT  --  0.4   ALKPHOS  --  353*     Troponin T: No results for input(s): TROPONINI in the last 72 hours.   Pro-BNP: No results found for: BNP  Lactate:   Lab Results Component Value Date    LACTA 0.8 02/19/2021         ABGs:   Lab Results   Component Value Date    PHART 7.340 02/19/2021    PO2ART 72.0 02/19/2021    ZWY8MGL 46.0 02/19/2021     INR:   Recent Labs     02/19/21  1154   INR 1.22*     TSH: No results found for: TSH, TSHREFLEX  URINALYSIS:No results for input(s): NITRITE, COLORU, PHUR, LABCAST, WBCUA, RBCUA, MUCUS, TRICHOMONAS, YEAST, BACTERIA, CLARITYU, SPECGRAV, LEUKOCYTESUR, UROBILINOGEN, BILIRUBINUR, BLOODU, GLUCOSEU, AMORPHOUS in the last 72 hours. Invalid input(s): Joan Sis / IMAGING REPORTS:     Ct Head Wo Contrast    Result Date: 2/19/2021  Examination. CT HEAD WO CONTRAST 2/19/2021 12:55 PM History: Altered mental status. DLP: 1305 mGycm. The CT scan of the head is performed without intravenous contrast enhancement. The images are acquired in axial plane and subsequent reconstruction in coronal and sagittal planes. The comparison is made with the previous study dated 9/1/2020. There is no evidence of mass. No midline shift. There is no evidence of an intracranial hemorrhage or hematoma. Moderately dilated ventricles, basal cistern and the cortical sulci are similar to the previous study suggesting a chronic volume loss. A few scattered areas of chronic white matter ischemia in the centrum semiovale bilaterally are stable. The gray-white matter differentiation is maintained. A partially empty sella turcica is seen. The images reviewed in bone window show no acute bony abnormality. The visualized paranasal sinuses and mastoid air cells are clear. A curvilinear metallic density in the lateral margin of the left globe/orbit is similar to the previous study. No acute intracranial abnormality.  Signed by Dr Kaiser Whitt on 2/19/2021 2:38 PM    Ct Chest W Contrast    Result Date: 2/19/2021 EXAM: CT CHEST W CONTRAST -- 2/19/2021 12:55 PM HISTORY: 56 years, Female, abnormal chest x-ray COMPARISON: 2/19/2021 DLP: 769.5 mGy cm. Automated exposure control was utilized to minimize patient radiation dose. TECHNIQUE: Enhanced  CT images of the chest obtained with multiplanar reformats. FINDINGS: AIRWAYS/PULMONARY PARENCHYMA: The central airways are midline and patent. Expiratory phase of imaging. Small right pleural effusion with compressive enhancing breast atelectasis involving the right middle and lower lobes. Right upper lobe with a pleural-based 1.7 x 2.0 cm opacity, which could represent round atelectasis, scarring or nodule. VASCULATURE: Thoracic aorta is normal in course and caliber. Mild calcified aortic atherosclerosis. Normal pulmonary artery caliber. No large central pulmonary artery filling defect on this non-CTA exam. Left subclavian vein with short segment severe stenosis on axial image 29. This is just lateral to the stent. There are left chest wall collaterals suggesting that this is a chronic finding. CARDIAC:  Borderline heart size. No pericardial effusion. Scattered coronary artery calcifications. MEDIASTINUM: There is no mediastinal or hilar lymphadenopathy by CT size criteria. Esophagus has normal coarse, caliber and wall thickness. EXTRATHORACIC: The visualized portions of the thyroid gland are unremarkable. No thoracic inlet or axillary adenopathy. Body wall edema. Left chest wall collaterals. INCLUDED UPPER ABDOMEN: The liver appears prominent low density, which could be due to fatty infiltration or phase of contrast. Cholecystectomy clips. No bile duct dilation. Small ascites. There is wedge-shaped low-density in the spleen, concerning for age-indeterminate splenic infarct. Visualized portion of the pancreas and adrenal glands appear within normal limits. Atrophic kidneys partially visualized. OSSEOUS: Mild degenerative changes in the spine. No acute or aggressive bony finding. 1. Right pleural effusion with compressive atelectasis of the right middle and lower lobes. 2. Right upper lobe with a pleural-based 1.7 x 2.0 cm opacity, which could represent round atelectasis, scarring or nodule. Recommend follow-up CT chest in 3 months per Fleischner criteria. 3. Left subclavian vein with a short segment severe stenosis, just lateral to the stent. Left chest wall collaterals suggest chronicity. 4. Borderline heart size with scattered coronary artery calcifications. 5. Wedge-shaped low-density in the spleen concerning for age-indeterminate splenic infarct. 6. Low-density of the liver which could be seen with fatty infiltration or exaggerated due to phase of contrast. Small volume ascites. Signed by Dr Robert Freitas on 2/19/2021 2:42 PM    Xr Chest Portable    Result Date: 2/19/2021  XR CHEST PORTABLE 2/19/2021 10:18 AM HISTORY: Shortness of breath COMPARISON: Chest x-ray dated 10/10/2020. FINDINGS: Mild cardiomegaly which is stable. Bilateral coarsened interstitial markings with large layering right pleural effusion. No pneumothorax. Vascular stent along the left brachiocephalic vein. No acute bony abnormality. 1. Interstitial edema with large layering right pleural effusion. Signed by Dr Tamara Mckeon on 2/19/2021 11:44 AM          Recent 2D ECHOCARDIOGRAM  05/08/2020     Summary   LV is normal in size with normal systolic function. LV ejection fraction   estimated at 50-60%. Mild concentric LVH. Probable grade 2 diastolic dysfunction. RV is normal in size with normal systolic function. Mild to moderate left atrial enlargement. Normal right atrial size. Aortic valve is trileaflet with mild leaflet thickening. Normal opening. No stenosis or regurgitation noted. Mitral valve is mild to moderately thickened with normal leaflet mobility. Likely Severe (3-4+) mitral regurgitation is present. No stenosis. Mild tricuspid regurgitation. RVSP estimated at 50 mmHg.       Signature ----------------------------------------------------------------   Electronically signed by Verna Oliver MD(Interpreting physician)   on 2020 07:30 PM   ----------------------------------------------------------------               ASSESSMENT / IMPRESSION & PLAN:          Hospital Problems           Last Modified POA    Type 2 diabetes mellitus with ophthalmic complication, with long-term current use of insulin ([facility] [address] 2021 Yes    Overview Signed 2016 11:33 AM by Kaia Morales Ambulatory     dx'd in  but likely ongoing before that, was dx'd when she went blind in the R eye  Replacing Inactive Diagnoses         HTN (hypertension) 2021 Yes    ESRD (end stage renal disease) on dialysis ([facility] [address] 2021 Yes    Acute diarrhea 2021 Yes    Elevated lipase 2021 Yes          Active Problems:    Type 2 diabetes mellitus with ophthalmic complication, with long-term current use of insulin (HCC)    HTN (hypertension)    ESRD (end stage renal disease) on dialysis (HCC)    Acute diarrhea    Elevated lipase  Resolved Problems:    * No resolved hospital problems. *      Generalized weakness  Diarrhea  Altered mental status  Transaminitis  · Initial work-up (2021) significant for;  · AB.3 446/72/20 4.8  · Leukopenia, with WBC of 3.7  · Lactic acid within lab reference range: 0.8  · Markedly elevated lipase: 1,492 (2021)  · Mildly elevated ESR: 30 mm/hr  · Elevated CRP: 7.45  · Mildly elevated ALT/AST: 55/54  · Elevated alkaline phosphatase: 353  · Molecular respiratory panel with COVID-19 grossly negative  · Blood cultures x2 sets pending  · Procalcitonin level  · Triglyceride level  · Ammonia level  · Follow-up acute viral hepatitis panel  · HIV screen  · Acetaminophen level  · Ethanol level  · Follow-up GGT level  · Follow-up LDH level  · Stool for C. difficile antigen toxin as well as ova and parasites.   · Follow-up abdominal ultrasound complete (2021) · Continue Lactulose (documented home regimen)  · CT head without contrast, with no acute intracranial abnormality. · CT chest with contrast (02/19/2021): Impression: Right pleural effusion with compressive atelectasis of the right middle and lower lobes. . Right upper lobe with a pleural-based 1.7 x 2.0 cm opacity, which could represent round atelectasis, scarring or nodule. Recommend follow-up CT chest in 3 months per Fleischner criteria. Left subclavian vein with a short segment severe stenosis, just lateral to the stent. Left chest wall collaterals suggest chronicity. . Borderline heart size with scattered coronary artery calcifications. . Wedge-shaped low-density in the spleen concerning for age-indeterminate splenic infarct. Low-density of the liver which could be seen with fatty infiltration or exaggerated due to phase of contrast. Small volume ascites. · CT abdomen pelvis with IV contrast (02/19/2021): Impression: Ascites and mesenteric edema Limited evaluation of the peripancreatic fat. Normal enhancement of the pancreas. Wedge-shaped hypodensity in the spleen concerning for age-indeterminate infarct. Splenic vein and artery appear patent andmnormal caliber. Enlarged low-density liver concerning for fatty liver. Right pleural effusion, ascites, body wall edema suggests fluid overload. Heavily calcified atherosclerosis. Subacute to chronic appearing pubic rami fractures. History of ESRD, on scheduled HD Tues_Thur_Sat  · Hyperkalemia, with a potassium of 5.3  · Initial BUN/creatinine of 77/7.2  · -Reportedly missed 1 session of HD (02/18/2021)  · - Nephrology consult  · - Continue scheduled HD  · - Continue management as per nephrology rec      History of insulin-dependent Diabetes Mellitus II  ? Hypoglycemic on presentation  ? Hemoglobin A1C   ?  Inpatient Regimens to include;  o -Hold basal Insulin Glargine (Lantus), for now, given concern for hypoglycemia o - Insulin Lispro (Humalog) on a Low dose sliding scale  ? Monitor POC glucose, and adjust insulin regimen accordingly based on daily insulin requirement. Continue management of other chronic medical conditions - Please see orders above         CONSULTS:    IP CONSULT TO NEPHROLOGY  IP CONSULT TO NEPHROLOGY  IP CONSULT TO SOCIAL WORK        INPATIENT CHECKLIST:      Nutrition: DIET CARB CONTROL; Renal    Prophylaxis Orders:   VTE - Heparin     CODE STATUS: FULL  ISOLATION:       DISCHARGE PLAN: tbd     Total face-to-face time spent with this patient, time spent reviewing medical records, and in coordination of care with the emergency department physician, nursing staff, in the examination, evaluation/assessment, counseling, review of medications and plan, was 70 mins . Electronically signed by   Trace Norton MD, MPH  Internal Medicine Hospitalist   2/19/2021 5:09 PM      EMR Dragon/Transcription disclaimer:   Much of this encounter note is an electronic transcription/translation of spoken language to printed text.  The electronic translation of spoken language may permit erroneous, or at times, nonsensical words or phrases to be inadvertently transcribed; although attempts have made to review the note for such errors, some may still exist.

## 2021-02-19 NOTE — PROGRESS NOTES
Contains abnormal data BLOOD GAS, ARTERIAL  Status:  Final result   Ref Range & Units 02/19/21 1131   pH, Arterial 7.350 - 7.450 7.340Low     pCO2, Arterial 35.0 - 45.0 mmHg 46. 0High     pO2, Arterial 80.0 - 100.0 mmHg 72. 0Low     HCO3, Arterial 22.0 - 26.0 mmol/L 24.8    Base Excess, Arterial -2.0 - 2.0 mmol/L -1.2    Hemoglobin, Art, Extended 12.0 - 16.0 g/dL 11.1Low     O2 Sat, Arterial >92 % 90.9    Carboxyhgb, Arterial 0.0 - 5.0 % 5.2High     Comment:      0.0-1.5   (Smokers 1.5-5.0)    Methemoglobin, Arterial <1.5 % 0.3    O2 Content, Arterial Not Established mL/dL 14.3    O2 Therapy  Unknown    Resulting Agency  [facility]        Specimen Collected: 02/19/21 1131 Last Resulted: 02/19/21 1131 View Other Order Details        Pt on 2 lpm RR 26 pt crying,site RB

## 2021-02-19 NOTE — CARE COORDINATION
Attempted to call [facility], emergency contact x2, no answer. Will try again later.

## 2021-02-20 ENCOUNTER — APPOINTMENT (OUTPATIENT)
Dept: ULTRASOUND IMAGING | Age: 61
DRG: 291 | End: 2021-02-20
Payer: MEDICARE

## 2021-02-20 LAB
ACETAMINOPHEN LEVEL: <15 UG/ML
ALPHA FETOPROTEIN: 1.5 NG/ML (ref 0–8.3)
AMMONIA: 13 UMOL/L (ref 11–51)
ANION GAP SERPL CALCULATED.3IONS-SCNC: 16 MMOL/L (ref 7–19)
BASOPHILS ABSOLUTE: 0 K/UL (ref 0–0.2)
BASOPHILS RELATIVE PERCENT: 0.3 % (ref 0–1)
BUN BLDV-MCNC: 47 MG/DL (ref 8–23)
CALCIUM SERPL-MCNC: 8.8 MG/DL (ref 8.8–10.2)
CHLORIDE BLD-SCNC: 97 MMOL/L (ref 98–111)
CO2: 26 MMOL/L (ref 22–29)
CREAT SERPL-MCNC: 5 MG/DL (ref 0.5–0.9)
EOSINOPHILS ABSOLUTE: 0.1 K/UL (ref 0–0.6)
EOSINOPHILS RELATIVE PERCENT: 4.3 % (ref 0–5)
ETHANOL: <10 MG/DL (ref 0–0.08)
FERRITIN: 614.4 NG/ML (ref 13–150)
GAMMA GLUTAMYL TRANSFERASE: 142 U/L (ref 7–54)
GFR AFRICAN AMERICAN: 11
GFR NON-AFRICAN AMERICAN: 9
GLUCOSE BLD-MCNC: 71 MG/DL (ref 70–99)
GLUCOSE BLD-MCNC: 83 MG/DL (ref 74–109)
GLUCOSE BLD-MCNC: 86 MG/DL (ref 70–99)
GLUCOSE BLD-MCNC: 92 MG/DL (ref 70–99)
GLUCOSE BLD-MCNC: 92 MG/DL (ref 70–99)
GLUCOSE BLD-MCNC: 94 MG/DL (ref 70–99)
HAV IGM SER IA-ACNC: NORMAL
HBV SURFACE AB TITR SER: REACTIVE {TITER}
HCT VFR BLD CALC: 29.6 % (ref 37–47)
HEMOGLOBIN: 9.7 G/DL (ref 12–16)
HEPATITIS B CORE IGM ANTIBODY: NORMAL
HEPATITIS B SURFACE ANTIGEN INTERPRETATION: NORMAL
HEPATITIS C ANTIBODY INTERPRETATION: NORMAL
HIV-1 P24 AG: NORMAL
IMMATURE GRANULOCYTES #: 0 K/UL
INR BLD: 1.29 (ref 0.88–1.18)
IRON SATURATION: 22 % (ref 14–50)
IRON: 59 UG/DL (ref 37–145)
LACTATE DEHYDROGENASE: 169 U/L (ref 91–215)
LIPASE: 565 U/L (ref 13–60)
LYMPHOCYTES ABSOLUTE: 0.4 K/UL (ref 1.1–4.5)
LYMPHOCYTES RELATIVE PERCENT: 13.3 % (ref 20–40)
MCH RBC QN AUTO: 31.4 PG (ref 27–31)
MCHC RBC AUTO-ENTMCNC: 32.8 G/DL (ref 33–37)
MCV RBC AUTO: 95.8 FL (ref 81–99)
MONOCYTES ABSOLUTE: 0.4 K/UL (ref 0–0.9)
MONOCYTES RELATIVE PERCENT: 11.1 % (ref 0–10)
NEUTROPHILS ABSOLUTE: 2.3 K/UL (ref 1.5–7.5)
NEUTROPHILS RELATIVE PERCENT: 70.4 % (ref 50–65)
PDW BLD-RTO: 17.5 % (ref 11.5–14.5)
PERFORMED ON: NORMAL
PLATELET # BLD: 105 K/UL (ref 130–400)
PMV BLD AUTO: 10.6 FL (ref 9.4–12.3)
POTASSIUM REFLEX MAGNESIUM: 4.3 MMOL/L (ref 3.5–5)
PROCALCITONIN: 0.24 NG/ML (ref 0–0.09)
PROTHROMBIN TIME: 16.1 SEC (ref 12–14.6)
RAPID HIV 1&2: NORMAL
RBC # BLD: 3.09 M/UL (ref 4.2–5.4)
SODIUM BLD-SCNC: 139 MMOL/L (ref 136–145)
TOTAL CK: 160 U/L (ref 26–192)
TOTAL IRON BINDING CAPACITY: 273 UG/DL (ref 250–400)
WBC # BLD: 3.2 K/UL (ref 4.8–10.8)

## 2021-02-20 PROCEDURE — 99222 1ST HOSP IP/OBS MODERATE 55: CPT | Performed by: INTERNAL MEDICINE

## 2021-02-20 PROCEDURE — 83615 LACTATE (LD) (LDH) ENZYME: CPT

## 2021-02-20 PROCEDURE — 82105 ALPHA-FETOPROTEIN SERUM: CPT

## 2021-02-20 PROCEDURE — 5A1D70Z PERFORMANCE OF URINARY FILTRATION, INTERMITTENT, LESS THAN 6 HOURS PER DAY: ICD-10-PCS | Performed by: INTERNAL MEDICINE

## 2021-02-20 PROCEDURE — 85025 COMPLETE CBC W/AUTO DIFF WBC: CPT

## 2021-02-20 PROCEDURE — 1210000000 HC MED SURG R&B

## 2021-02-20 PROCEDURE — 6370000000 HC RX 637 (ALT 250 FOR IP): Performed by: INTERNAL MEDICINE

## 2021-02-20 PROCEDURE — 82140 ASSAY OF AMMONIA: CPT

## 2021-02-20 PROCEDURE — 80143 DRUG ASSAY ACETAMINOPHEN: CPT

## 2021-02-20 PROCEDURE — 83690 ASSAY OF LIPASE: CPT

## 2021-02-20 PROCEDURE — 8010000000 HC HEMODIALYSIS ACUTE INPT

## 2021-02-20 PROCEDURE — 82390 ASSAY OF CERULOPLASMIN: CPT

## 2021-02-20 PROCEDURE — 83550 IRON BINDING TEST: CPT

## 2021-02-20 PROCEDURE — 82103 ALPHA-1-ANTITRYPSIN TOTAL: CPT

## 2021-02-20 PROCEDURE — 36415 COLL VENOUS BLD VENIPUNCTURE: CPT

## 2021-02-20 PROCEDURE — 2700000000 HC OXYGEN THERAPY PER DAY

## 2021-02-20 PROCEDURE — 85610 PROTHROMBIN TIME: CPT

## 2021-02-20 PROCEDURE — 80074 ACUTE HEPATITIS PANEL: CPT

## 2021-02-20 PROCEDURE — 2580000003 HC RX 258: Performed by: INTERNAL MEDICINE

## 2021-02-20 PROCEDURE — 87350 HEPATITIS BE AG IA: CPT

## 2021-02-20 PROCEDURE — 86706 HEP B SURFACE ANTIBODY: CPT

## 2021-02-20 PROCEDURE — 86707 HEPATITIS BE ANTIBODY: CPT

## 2021-02-20 PROCEDURE — 80048 BASIC METABOLIC PNL TOTAL CA: CPT

## 2021-02-20 PROCEDURE — 87806 HIV AG W/HIV1&2 ANTB W/OPTIC: CPT

## 2021-02-20 PROCEDURE — 83540 ASSAY OF IRON: CPT

## 2021-02-20 PROCEDURE — 82728 ASSAY OF FERRITIN: CPT

## 2021-02-20 PROCEDURE — 6360000002 HC RX W HCPCS: Performed by: INTERNAL MEDICINE

## 2021-02-20 PROCEDURE — 82550 ASSAY OF CK (CPK): CPT

## 2021-02-20 PROCEDURE — 94640 AIRWAY INHALATION TREATMENT: CPT

## 2021-02-20 PROCEDURE — 82077 ASSAY SPEC XCP UR&BREATH IA: CPT

## 2021-02-20 PROCEDURE — 87522 HEPATITIS C REVRS TRNSCRPJ: CPT

## 2021-02-20 PROCEDURE — 82947 ASSAY GLUCOSE BLOOD QUANT: CPT

## 2021-02-20 PROCEDURE — 84145 PROCALCITONIN (PCT): CPT

## 2021-02-20 PROCEDURE — 82977 ASSAY OF GGT: CPT

## 2021-02-20 RX ORDER — OXYCODONE HYDROCHLORIDE AND ACETAMINOPHEN 5; 325 MG/1; MG/1
1 TABLET ORAL EVERY 6 HOURS PRN
Status: DISCONTINUED | OUTPATIENT
Start: 2021-02-20 | End: 2021-02-27 | Stop reason: HOSPADM

## 2021-02-20 RX ORDER — MORPHINE SULFATE 4 MG/ML
1 INJECTION, SOLUTION INTRAMUSCULAR; INTRAVENOUS EVERY 6 HOURS PRN
Status: DISCONTINUED | OUTPATIENT
Start: 2021-02-20 | End: 2021-02-20

## 2021-02-20 RX ORDER — MORPHINE SULFATE 4 MG/ML
1 INJECTION, SOLUTION INTRAMUSCULAR; INTRAVENOUS EVERY 4 HOURS PRN
Status: DISCONTINUED | OUTPATIENT
Start: 2021-02-20 | End: 2021-02-26

## 2021-02-20 RX ORDER — FAMOTIDINE 20 MG/1
10 TABLET, FILM COATED ORAL DAILY
Status: DISCONTINUED | OUTPATIENT
Start: 2021-02-20 | End: 2021-02-27 | Stop reason: HOSPADM

## 2021-02-20 RX ADMIN — DEXTROSE MONOHYDRATE 50 ML/HR: 50 INJECTION, SOLUTION INTRAVENOUS at 00:54

## 2021-02-20 RX ADMIN — IPRATROPIUM BROMIDE AND ALBUTEROL SULFATE 1 AMPULE: .5; 3 SOLUTION RESPIRATORY (INHALATION) at 19:14

## 2021-02-20 RX ADMIN — MORPHINE SULFATE 1 MG: 4 INJECTION, SOLUTION INTRAMUSCULAR; INTRAVENOUS at 13:29

## 2021-02-20 RX ADMIN — Medication 1 MG: at 19:23

## 2021-02-20 RX ADMIN — Medication 10 ML: at 02:46

## 2021-02-20 RX ADMIN — IPRATROPIUM BROMIDE AND ALBUTEROL SULFATE 1 AMPULE: .5; 3 SOLUTION RESPIRATORY (INHALATION) at 10:42

## 2021-02-20 RX ADMIN — Medication 1 MG: at 17:52

## 2021-02-20 RX ADMIN — IPRATROPIUM BROMIDE AND ALBUTEROL SULFATE 1 AMPULE: .5; 3 SOLUTION RESPIRATORY (INHALATION) at 14:16

## 2021-02-20 RX ADMIN — VANCOMYCIN HYDROCHLORIDE 1000 MG: 10 INJECTION, POWDER, LYOPHILIZED, FOR SOLUTION INTRAVENOUS at 16:35

## 2021-02-20 RX ADMIN — OXYCODONE HYDROCHLORIDE AND ACETAMINOPHEN 1 TABLET: 5; 325 TABLET ORAL at 16:48

## 2021-02-20 RX ADMIN — DARBEPOETIN ALFA 60 MCG: 60 SOLUTION INTRAVENOUS; SUBCUTANEOUS at 12:47

## 2021-02-20 RX ADMIN — IPRATROPIUM BROMIDE AND ALBUTEROL SULFATE 1 AMPULE: .5; 3 SOLUTION RESPIRATORY (INHALATION) at 06:22

## 2021-02-20 RX ADMIN — Medication 10 ML: at 02:47

## 2021-02-20 RX ADMIN — MEROPENEM 500 MG: 500 INJECTION, POWDER, FOR SOLUTION INTRAVENOUS at 16:35

## 2021-02-20 ASSESSMENT — PAIN SCALES - GENERAL
PAINLEVEL_OUTOF10: 8
PAINLEVEL_OUTOF10: 10
PAINLEVEL_OUTOF10: 5
PAINLEVEL_OUTOF10: 10

## 2021-02-20 NOTE — PROGRESS NOTES
 dextrose 50 % IV solution  12.5 g Intravenous PRN Alfa Dai MD   12.5 g at 02/19/21 2110    glucagon (rDNA) injection 1 mg  1 mg Intramuscular PRN Alfa Dai MD        dextrose 5 % solution  100 mL/hr Intravenous PRN Alfa Dai MD 50 mL/hr at 02/20/21 0054 50 mL/hr at 02/20/21 0054    sodium chloride flush 0.9 % injection 10 mL  10 mL Intravenous 2 times per day Alfa Dai MD   10 mL at 02/20/21 0246    sodium chloride flush 0.9 % injection 10 mL  10 mL Intravenous PRN Alfa Dai MD        potassium chloride (KLOR-CON M) extended release tablet 40 mEq  40 mEq Oral PRN Alfa Dai MD        Or    potassium bicarb-citric acid (EFFER-K) effervescent tablet 40 mEq  40 mEq Oral PRN Alfa Dai MD        Or    potassium chloride 10 mEq/100 mL IVPB (Peripheral Line)  10 mEq Intravenous PRN Alfa Dai MD        promethazine (PHENERGAN) tablet 12.5 mg  12.5 mg Oral Q6H PRN Alfa Dai MD        Or    ondansetron [facility]) injection 4 mg  4 mg Intravenous Q6H PRN Alfa Dai MD        magnesium hydroxide (MILK OF MAGNESIA) 400 MG/5ML suspension 30 mL  30 mL Oral Daily PRN Alfa Dai MD        insulin lispro (HUMALOG) injection vial 0-6 Units  0-6 Units Subcutaneous TID WC Alfa Dai MD        insulin lispro (HUMALOG) injection vial 0-3 Units  0-3 Units Subcutaneous Nightly Alfa Dai MD        heparin (porcine) injection 5,000 Units  5,000 Units Subcutaneous 3 times per day Alfa Dai MD   Stopped at 02/20/21 0057    sodium chloride flush 0.9 % injection 10 mL  10 mL Intravenous 2 times per day Alfa Dai MD   10 mL at 02/20/21 0247    sodium chloride flush 0.9 % injection 10 mL  10 mL Intravenous PRN Alfa Dai MD             dextrose 50 mL/hr (02/20/21 0054)      famotidine  10 mg Oral Daily    darbepoetin angie-polysorbate  60 mcg Subcutaneous Weekly  ipratropium-albuterol  1 ampule Inhalation Q4H WA    aspirin EC  81 mg Oral Daily    sevelamer  800 mg Oral Daily    sodium chloride flush  10 mL Intravenous 2 times per day    insulin lispro  0-6 Units Subcutaneous TID     insulin lispro  0-3 Units Subcutaneous Nightly    heparin (porcine)  5,000 Units Subcutaneous 3 times per day    sodium chloride flush  10 mL Intravenous 2 times per day     albuterol, glucose, dextrose, glucagon (rDNA), dextrose, sodium chloride flush, potassium chloride **OR** potassium alternative oral replacement **OR** potassium chloride, promethazine **OR** ondansetron, magnesium hydroxide, sodium chloride flush  DIET CARB CONTROL;  Renal  Diet NPO, After Midnight       Labs:   CBC with DIFF:  Recent Labs     02/19/21  1154 02/20/21 0227   WBC 3.7* 3.2*   RBC 3.83* 3.09*   HGB 11.9* 9.7*   HCT 37.7 29.6*   MCV 98.4 95.8   MCH 31.1* 31.4*   MCHC 31.6* 32.8*   RDW 18.0* 17.5*   * 105*   MPV 10.8 10.6   NEUTOPHILPCT 71.9* 70.4*   LYMPHOPCT 13.2* 13.3*   MONOPCT 8.8 11.1*   EOSRELPCT 5.5* 4.3   BASOPCT 0.3 0.3   NEUTROABS 2.6 2.3   LYMPHSABS 0.5* 0.4*   MONOSABS 0.30 0.40   EOSABS 0.20 0.10   BASOSABS 0.00 0.00       CMP/BMP:  Recent Labs     02/19/21  1131 02/19/21  1154 02/20/21 0227   NA  --  135* 139   K 5.1 5.3* 4.3   CL  --  92* 97*   CO2  --  26 26   ANIONGAP  --  17 16   GLUCOSE  --  67* 83   BUN  --  77* 47*   CREATININE  --  7.2* 5.0*   LABGLOM  --  6* 9*   CALCIUM  --  9.7 8.8   PROT  --  7.6  --    LABALBU  --  4.4  --    BILITOT  --  0.4  --    ALKPHOS  --  353*  --    ALT  --  55*  --    AST  --  54*  --          CRP:    Recent Labs     02/19/21  1154   CRP 7.45*     Sed Rate:    Recent Labs     02/19/21  1154   SEDRATE 30*         HgBA1c:  No components found for: HGBA1C  FLP:    Lab Results   Component Value Date    TRIG 65 02/19/2021    HDL 61 06/09/2020    LDLCALC 25 06/09/2020    LDLDIRECT 120 07/28/2015     TSH:  No results found for: TSH Examination. CT HEAD WO CONTRAST 2/19/2021 12:55 PM History: Altered mental status. DLP: 1305 mGycm. The CT scan of the head is performed without intravenous contrast enhancement. The images are acquired in axial plane and subsequent reconstruction in coronal and sagittal planes. The comparison is made with the previous study dated 9/1/2020. There is no evidence of mass. No midline shift. There is no evidence of an intracranial hemorrhage or hematoma. Moderately dilated ventricles, basal cistern and the cortical sulci are similar to the previous study suggesting a chronic volume loss. A few scattered areas of chronic white matter ischemia in the centrum semiovale bilaterally are stable. The gray-white matter differentiation is maintained. A partially empty sella turcica is seen. The images reviewed in bone window show no acute bony abnormality. The visualized paranasal sinuses and mastoid air cells are clear. A curvilinear metallic density in the lateral margin of the left globe/orbit is similar to the previous study. No acute intracranial abnormality.  Signed by Dr Mikey Moncada on 2/19/2021 2:38 PM    Ct Chest W Contrast    Result Date: 2/19/2021 EXAM: CT CHEST W CONTRAST -- 2/19/2021 12:55 PM HISTORY: 56 years, Female, abnormal chest x-ray COMPARISON: 2/19/2021 DLP: 769.5 mGy cm. Automated exposure control was utilized to minimize patient radiation dose. TECHNIQUE: Enhanced  CT images of the chest obtained with multiplanar reformats. FINDINGS: AIRWAYS/PULMONARY PARENCHYMA: The central airways are midline and patent. Expiratory phase of imaging. Small right pleural effusion with compressive enhancing breast atelectasis involving the right middle and lower lobes. Right upper lobe with a pleural-based 1.7 x 2.0 cm opacity, which could represent round atelectasis, scarring or nodule. VASCULATURE: Thoracic aorta is normal in course and caliber. Mild calcified aortic atherosclerosis. Normal pulmonary artery caliber. No large central pulmonary artery filling defect on this non-CTA exam. Left subclavian vein with short segment severe stenosis on axial image 29. This is just lateral to the stent. There are left chest wall collaterals suggesting that this is a chronic finding. CARDIAC:  Borderline heart size. No pericardial effusion. Scattered coronary artery calcifications. MEDIASTINUM: There is no mediastinal or hilar lymphadenopathy by CT size criteria. Esophagus has normal coarse, caliber and wall thickness. EXTRATHORACIC: The visualized portions of the thyroid gland are unremarkable. No thoracic inlet or axillary adenopathy. Body wall edema. Left chest wall collaterals. INCLUDED UPPER ABDOMEN: The liver appears prominent low density, which could be due to fatty infiltration or phase of contrast. Cholecystectomy clips. No bile duct dilation. Small ascites. There is wedge-shaped low-density in the spleen, concerning for age-indeterminate splenic infarct. Visualized portion of the pancreas and adrenal glands appear within normal limits. Atrophic kidneys partially visualized. OSSEOUS: Mild degenerative changes in the spine. No acute or aggressive bony finding. 1. Right pleural effusion with compressive atelectasis of the right middle and lower lobes. 2. Right upper lobe with a pleural-based 1.7 x 2.0 cm opacity, which could represent round atelectasis, scarring or nodule. Recommend follow-up CT chest in 3 months per Fleischner criteria. 3. Left subclavian vein with a short segment severe stenosis, just lateral to the stent. Left chest wall collaterals suggest chronicity. 4. Borderline heart size with scattered coronary artery calcifications. 5. Wedge-shaped low-density in the spleen concerning for age-indeterminate splenic infarct. 6. Low-density of the liver which could be seen with fatty infiltration or exaggerated due to phase of contrast. Small volume ascites.  Signed by Dr Daysi Haq on 2/19/2021 2:42 PM    Ct Abdomen Pelvis W Iv Contrast Additional Contrast? None    Result Date: 2/19/2021 EXAM: CT ABDOMEN PELVIS W IV CONTRAST -- 2/19/2021 12:55 PM HISTORY: 56 years, Female, markedly elevated lipase, abdominal pain, diarrhea, ESRD on hemodialysis COMPARISON: No existing relevant imaging studies available DLP: 769.5 mGy cm. Automated exposure control was utilized to minimize patient radiation dose. TECHNIQUE: Enhanced CT images obtained of the abdomen and pelvis with multiplanar reformats. FINDINGS: LUNG BASES: Right pleural effusion with compressive atelectasis. Underlying inferior heart size with scattered coronary artery calcifications. LIVER: Diffuse low-density of the liver concerning for fatty liver. Liver appears mildly enlarged. Patent portal and hepatic veins. GALLBLADDER and BILARY: Cholecystectomy clips. No bile duct dilation. PANCREAS: Normal enhancement of the pancreas. There is ascites and mesenteric edema throughout the abdomen, which limited evaluation of the peripancreatic fat. SPLEEN: There is a wedge-shaped hypodensity in the spleen concerning for infarct. Splenic vein appears grossly patent. Normal caliber splenic artery. ADRENALS: No adrenal mass. URINARY: Atrophic kidneys. No hydronephrosis. Renal vascular calcifications. No convincing nephrolithiasis. Urinary bladder is underdistended, which limits evaluation. Grossly normal CT appearance of the uterus. Adnexa are not discretely identified. PERITONEUM: No ascites/free fluid. No pneumoperitoneum. BOWEL: Stomach and duodenum have normal course and caliber. No abnormally dilated loops of bowel or bowel wall thickening. Normal short appendix or appendiceal stump on axial images 61-57. RETROPERITONEUM:  Aorta normal in course and caliber with calcified atherosclerosis. Celiac and superior mesenteric arteries patent. Bilateral renal arteries diminutive. Inferior mesenteric artery opacified.  Heavily calcified iliac arteries, limited in evaluation on this non-CTA exam. Scattered retroperitoneal lymph nodes, not enlarged by CT size criteria, which may be reactive. ABDOMINAL WALL: Significant body wall edema. Prominent vessel in the partially visualized left distal upper arm, possibly fistula. OSSEOUS: Subacute to chronic right superior pubic ramus, right inferior pubic ramus and left inferior pubic rami fractures. 1. Ascites and mesenteric edema Limited evaluation of the peripancreatic fat. Normal enhancement of the pancreas. 2. Wedge-shaped hypodensity in the spleen concerning for age-indeterminate infarct. Splenic vein and artery appear patent and normal caliber. 3. Enlarged low-density liver concerning for fatty liver. 4. Right pleural effusion, ascites, body wall edema suggests fluid overload. 5. Heavily calcified atherosclerosis. 6. Subacute to chronic appearing pubic rami fractures. 7. See separately dictated CT chest of the same day. Signed by Dr Lynette Osorio on 2/19/2021 2:54 PM    Xr Chest Portable    Result Date: 2/19/2021  XR CHEST PORTABLE 2/19/2021 10:18 AM HISTORY: Shortness of breath COMPARISON: Chest x-ray dated 10/10/2020. FINDINGS: Mild cardiomegaly which is stable. Bilateral coarsened interstitial markings with large layering right pleural effusion. No pneumothorax. Vascular stent along the left brachiocephalic vein. No acute bony abnormality. 1. Interstitial edema with large layering right pleural effusion. Signed by Dr Paige Loco on 2/19/2021 11:44 AM      Recent 2D ECHOCARDIOGRAM  05/08/2020    Summary   LV is normal in size with normal systolic function. LV ejection fraction   estimated at 50-60%.  Mild concentric LVH. Probable grade 2 diastolic dysfunction.   RV is normal in size with normal systolic function.   Mild to moderate left atrial enlargement.   Normal right atrial size.   Aortic valve is trileaflet with mild leaflet thickening. Normal opening.   No stenosis or regurgitation noted.   Mitral valve is mild to moderately thickened with normal leaflet mobility.  Likely Severe (3-4+) mitral regurgitation is present. No stenosis.   Mild tricuspid regurgitation.  RVSP estimated at 50 mmHg.      Signature    ----------------------------------------------------------------   Electronically signed by Cassie Oliver MD(Interpreting physician)   on 05/08/2020 07:30 PM   ----------------------------------------------------------------        Objective:   Vitals:   BP (!) 154/63   Pulse 82   Temp 97.6 °F (36.4 °C)   Resp 18   Wt 145 lb 1.6 oz (65.8 kg)   SpO2 100%   BMI 29.31 kg/m²     Patient Vitals for the past 24 hrs:   BP Temp Temp src Pulse Resp SpO2 Weight   02/20/21 0648 (!) 154/63 97.6 °F (36.4 °C)  82 18 100 %    02/20/21 0623     18     02/19/21 2345 130/72 97.4 °F (36.3 °C) Temporal 80 20 98 % 145 lb 1.6 oz (65.8 kg)   02/19/21 1941 125/60 96.4 °F (35.8 °C) Temporal 78 20 96 %    02/19/21 1553 116/82 96.6 °F (35.9 °C)  68 18 96 %    02/19/21 1134 123/86   66 19 98 %    02/19/21 1132 104/70   67 16 98 %        24HR INTAKE/OUTPUT:      Intake/Output Summary (Last 24 hours) at 2/20/2021 1114  Last data filed at 2/20/2021 1007  Gross per 24 hour   Intake 0 ml   Output    Net 0 ml       Physical Exam  Vitals signs and nursing note reviewed. Constitutional:       General: She is not in acute distress. Appearance: Normal appearance. She is ill-appearing. She is not toxic-appearing or diaphoretic. Comments: Opens eyes and responsive to verbal commands. HENT:      Head: Normocephalic and atraumatic. Right Ear: External ear normal.      Left Ear: External ear normal.      Nose: Nose normal. No congestion or rhinorrhea. Eyes:      General: No scleral icterus. Right eye: No discharge. Left eye: No discharge. Extraocular Movements: Extraocular movements intact. Conjunctiva/sclera: Conjunctivae normal.   Neck:      Musculoskeletal: Normal range of motion and neck supple. No neck rigidity or muscular tenderness. Vascular: No carotid bruit. Cardiovascular:      Rate and Rhythm: Normal rate and regular rhythm. Pulses: Normal pulses. Heart sounds: Normal heart sounds. No friction rub. No gallop. Pulmonary:      Effort: Pulmonary effort is normal. No respiratory distress. Breath sounds: Normal breath sounds. No stridor. No wheezing, rhonchi or rales. Comments: Diminished breath sounds bilaterally  Chest:      Chest wall: No tenderness. Abdominal:      General: Bowel sounds are normal. There is no distension. Palpations: Abdomen is soft. Tenderness: There is abdominal tenderness. There is no guarding or rebound. Comments: Diffuse tenderness to palpation. No guarding, rigidity, rebound tenderness noted/elicited   Musculoskeletal: Normal range of motion. General: No swelling, tenderness, deformity or signs of injury. Right lower leg: Edema present. Left lower leg: Edema present. Skin:     General: Skin is warm and dry. Capillary Refill: Capillary refill takes less than 2 seconds. Coloration: Skin is not jaundiced or pale. Findings: No bruising, erythema, lesion or rash. Neurological:      General: No focal deficit present. Mental Status: She is alert. She is disoriented. Cranial Nerves: No cranial nerve deficit. Psychiatric:         Mood and Affect: Mood normal.      Comments: Unable to assess judgment and thought content.            Assessment/plan:       Hospital Problems           Last Modified POA    Type 2 diabetes mellitus with ophthalmic complication, with long-term current use of insulin (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 [address] 2/19/2021 Yes    Acute diarrhea 2/19/2021 Yes Elevated lipase 2021 Yes          Active Problems:    Type 2 diabetes mellitus with ophthalmic complication, with long-term current use of insulin (HCC)    HTN (hypertension)    ESRD (end stage renal disease) on dialysis (HCC)    Acute diarrhea    Elevated lipase  Resolved Problems:    * No resolved hospital problems. *        Brief Summary  Ms. Kellen Webb a 56 y.o. female with a history of DMT2, ESRD, on HD, CHF, presenting to [facility] ED (2021) on account of diarrhea, with an associated abdominal/back pain, generalized weakness and lethargy.     Patient reportedly has missed 1 session of HD, due to the symptoms noted above.     Patient reportedly is on baseline home oxygen.     Initial work-up significant for;  AB.3  4.8  Hyperkalemia, with a potassium of 5.3  Initial BUN/creatinine of 77/7.2  Hypoglycemia with initial glucose of 67  Leukopenia, with WBC of 3.7  Lactic acid within lab reference range: 0.8  Markedly elevated lipase: 1,492  Mildly elevated ESR: 30 mm/hr  Elevated CRP: 7.45  Molecular respiratory panel with COVID-19 grossly negative  Patient admitted for further work-up and management.     Generalized weakness  Diarrhea  Altered mental status  Transaminitis  · Initial work-up (2021) significant for;  · AB.3  4.8  · Leukopenia, with WBC of 3.7  · Lactic acid within lab reference range: 0.8  · Markedly elevated lipase: 1,492 (2021)  · Mildly elevated ESR: 30 mm/hr  · Elevated CRP: 7.45  · Mildly elevated ALT/AST: 55/54  · Elevated alkaline phosphatase: 353  · Molecular respiratory panel with COVID-19 grossly negative  · Blood cultures x2 sets pending  · Mildly elevated Procalcitonin level: 0.24 (2021)  · Elevated CRP: 7.45 [0.00 - 0.50 mg/dL]  · Mildly elevated ESR  · Elevated GGT  · Elevated serum ferritin  · Triglyceride level within lab reference range  · Ammonia level  · Acute viral hepatitis panel grossly negative · - Continue scheduled HD  · - Continue management as per nephrology rec        History of insulin-dependent Diabetes Mellitus II  · Hypoglycemic on presentation  · Hemoglobin A1C: 6.7% (02/19/2021)  · Inpatient Regimens to include;  ? -Hold basal Insulin Glargine (Lantus), for now, given concern for hypoglycemia  ? - Insulin Lispro (Humalog) on a Low dose sliding scale  · Monitor POC glucose, and adjust insulin regimen accordingly based on daily insulin requirement.         Continue management of other chronic medical conditions - see above and orders. Advance Directive: Full Code    DIET CARB CONTROL; Renal  Diet NPO, After Midnight         Consults Made:   IP CONSULT TO NEPHROLOGY  IP CONSULT TO NEPHROLOGY  IP CONSULT TO SOCIAL WORK  IP CONSULT TO GI    DVT prophylaxis: Heparin      Discharge planning: tbd    Time Spent is 35 mins in the examination, evaluation, counseling and review of medications, assessment and plan.      Electronically signed by   Tucker Montelongo MD, MPH,   Internal Medicine Hospitalist   2/20/2021 11:14 AM

## 2021-02-20 NOTE — CONSULTS
 Renal osteodystrophy     Smoker     Type II or unspecified type diabetes mellitus with renal manifestations, uncontrolled(250.42)         Past Surgical History     Past Surgical History:   Procedure Laterality Date    CARPAL TUNNEL RELEASE      CATARACT REMOVAL       SECTION      , , and     CHOLECYSTECTOMY      COLONOSCOPY N/A 3/9/2018    Dr ReevesOvzlxw-Atrrzpqwaoljmp-Gkmdbxszxlazo AP (-) dysplasia x 1, tubular AP x  (-) dysplasia x 1, HP x 4--1 yr recall    DIALYSIS FISTULA CREATION Left 4/23/15 [facility]    Left proximal ulnar artery to cephalic vein AVF creation    EYE SURGERY      laser, several times     TYMPANOSTOMY TUBE PLACEMENT Bilateral     VASCULAR SURGERY Left 5/11/15 [facility]    US-guided cannulation left distal upper arm cephalic vein with 4-[place]izen [place] glide sheath; LUE AV f'grams with venography SVC; left cephalic vein BAM with [id number] julieth balloon; completion venogram E    VASCULAR SURGERY Left 5/28/15 [facility]    Percutaneous cannulation left distal radial artery with 4-[place]izen [place] glide sheath; LUE AV f'grams with venography SVC; left cephalic vein balloon a'plasty with [id number] julieth balloon and [id number] julieth balloon; proximal and mid upper arm cephalic vein BAM with [id number] julieth balloon; completion venogram E    VASCULAR SURGERY Left 6/15/15 [facility]    US-guided cannulation left cephalic vein with 4-[place]izen [place] and later 7-[place]izen [place] sheath; LUE AV f'grams including venography SVC; left cephalic vein stenosis balloon a'plasty with [id number] cutting balloon; completion f'gram and venogram [facility]    VASCULAR SURGERY  05/15/2017    [facility]. Left upper fistulograms/venograms.  VASCULAR SURGERY  2018    [facility]. Left upper fistulograms, left subclavian BA 12x40 atlas, left cephalic arch BA 39M91 conquest.    VASCULAR SURGERY  2019    S. Left upper fistulograms,BA left SCV 8x40 conquest,stent left SCV, viabahn 13x50,left SCV stent BA 12x40 conquest.        Medications ondansetron (ZOFRAN) 4 MG tablet Take 4 mg by mouth every 6 hours    Historical Provider, MD   dicyclomine (BENTYL) 10 MG capsule Take 1 capsule by mouth 4 times daily 4/14/20   RAMONA Witt   B-D ULTRAFINE III SHORT PEN 31G X 8 MM MISC USE ONCE DAILY TO INJECT INSULIN DAILY 3/16/20   RAMONA Witt   lactulose (IntelligentMDx1 Paystik) 10 GM/15ML solution TAKE 30 ML BY MOUTH THREE TIMES DAILY 9/25/19   RAMONA Witt   blood glucose monitor kit and supplies 1 kit by Other route 3 times daily Test three times a day & as needed for symptoms of irregular blood glucose. 7/11/18 10/16/20  RAMONA Zhao   midodrine (PROAMATINE) 5 MG tablet TK 1 T PO HALF WAY THROUGH TREATMENT AS DIRECTED 10/17/17   Historical Provider, MD   sevelamer (RENVELA) 800 MG tablet Take 1 tablet by mouth daily     Historical Provider, MD   fluticasone (FLONASE) 50 MCG/ACT nasal spray 2 sprays by Nasal route daily. To keep ears opened up 3/27/14   Della Davison MD   Blood Glucose Monitoring Suppl NIKOS by Does not apply route. Pt will also need new lancet device if not included in kit. 12/9/13   Della Davison MD   Glucose Blood (BLOOD GLUCOSE TEST STRIPS) STRP Check blood sugar twice a day for recent medication adjustments. 12/9/13   Della Davison MD   bimatoprost 0.01 % SOLN Apply 1 drop to eye nightly. One drop in left eye at     Historical Provider, MD   Insulin Syringe-Needle U-100 (ACCUSURE INS SYR 1CC/31GX5/16\") 31G X 5/16\" 1 ML MISC by Does not apply route.  9/25/12   Della Davison MD            famotidine (PEPCID) tablet 10 mg, Daily      darbepoetin angie-polysorbate (ARANESP) injection 60 mcg, Weekly      meropenem (MERREM) 500 mg in sterile water 10 mL IV syringe, Q24H      vancomycin (VANCOCIN) intermittent dosing (placeholder), RX Placeholder      vancomycin (VANCOCIN) 1000 mg in dextrose 5% 250 mL IVPB, Once      oxyCODONE-acetaminophen (PERCOCET) 5-325 MG per tablet 1 tablet, Q6H PRN   morphine injection 1 mg, Q6H PRN      ipratropium-albuterol (DUONEB) nebulizer solution 1 ampule, Q4H WA      aspirin EC tablet 81 mg, Daily      albuterol (PROVENTIL) nebulizer solution 2.5 mg, Q6H PRN      sevelamer (RENVELA) tablet 800 mg, Daily      glucose (GLUTOSE) 40 % oral gel 15 g, PRN      dextrose 50 % IV solution, PRN      glucagon (rDNA) injection 1 mg, PRN      dextrose 5 % solution, PRN      sodium chloride flush 0.9 % injection 10 mL, 2 times per day      sodium chloride flush 0.9 % injection 10 mL, PRN      potassium chloride (KLOR-CON M) extended release tablet 40 mEq, PRN    Or      potassium bicarb-citric acid (EFFER-K) effervescent tablet 40 mEq, PRN    Or      potassium chloride 10 mEq/100 mL IVPB (Peripheral Line), PRN      promethazine (PHENERGAN) tablet 12.5 mg, Q6H PRN    Or      ondansetron (ZOFRAN) injection 4 mg, Q6H PRN      magnesium hydroxide (MILK OF MAGNESIA) 400 MG/5ML suspension 30 mL, Daily PRN      insulin lispro (HUMALOG) injection vial 0-6 Units, TID WC      insulin lispro (HUMALOG) injection vial 0-3 Units, Nightly      heparin (porcine) injection 5,000 Units, 3 times per day      sodium chloride flush 0.9 % injection 10 mL, 2 times per day      sodium chloride flush 0.9 % injection 10 mL, PRN        Allergies   Bupropion, Gabapentin, Sulfa antibiotics, Varenicline, Wellbutrin [bupropion hcl], Azithromycin, Levaquin [levofloxacin], and Penicillins    Social History     Social History     Tobacco History     Smoking Status  Current Every Day Smoker Smoking Frequency  1 pack/day for 40 years (40 pk yrs)    Smokeless Tobacco Use  Never Used          Alcohol History     Alcohol Use Status  No          Drug Use     Drug Use Status  Yes Types  Marijuana          Sexual Activity     Sexually Active  Not Currently Partners  Male                Family History     Family History   Problem Relation Age of Onset    Heart Disease Mother     Heart Attack Mother  Glaucoma Sister     Diabetes Sister     Diabetes Brother     Kidney Disease Brother     Blindness Brother     Stroke Maternal Grandmother     Stroke Maternal Grandfather     Colon Cancer Neg Hx     Colon Polyps Neg Hx     Liver Cancer Neg Hx     Liver Disease Neg Hx     Esophageal Cancer Neg Hx     Rectal Cancer Neg Hx     Stomach Cancer Neg Hx        Review of Systems   Review of Systems   Unable to perform ROS: Mental status change        Physical Exam   BP (!) 164/63   Pulse 88   Temp 98.6 °F (37 °C)   Resp 20   Wt 145 lb 1.6 oz (65.8 kg)   SpO2 100%   BMI 29.31 kg/m²      Physical Exam  Constitutional:       Appearance: She is ill-appearing. HENT:      Head: Normocephalic. Mouth/Throat:      Mouth: Mucous membranes are dry. Neck:      Musculoskeletal: Neck supple. Cardiovascular:      Rate and Rhythm: Normal rate and regular rhythm. Pulmonary:      Effort: Pulmonary effort is normal.      Breath sounds: Decreased air movement present. Abdominal:      General: Bowel sounds are normal.      Palpations: Abdomen is soft. Tenderness: There is generalized abdominal tenderness. Skin:     General: Skin is warm and dry. Neurological:      Mental Status: She is lethargic and disoriented. Labs    CBC:  Recent Labs     02/19/21  1154 02/20/21  0227   WBC 3.7* 3.2*   RBC 3.83* 3.09*   HGB 11.9* 9.7*   HCT 37.7 29.6*   MCV 98.4 95.8   RDW 18.0* 17.5*   * 105*     CHEMISTRIES:  Recent Labs     02/19/21  1131 02/19/21  1154 02/20/21  0227   NA  --  135* 139   K 5.1 5.3* 4.3   CL  --  92* 97*   CO2  --  26 26   BUN  --  77* 47*   CREATININE  --  7.2* 5.0*   GLUCOSE  --  67* 83     PT/INR:  Recent Labs     02/19/21  1154 02/20/21  0226   PROTIME 15.4* 16.1*   INR 1.22* 1.29*     APTT:No results for input(s): APTT in the last 72 hours.   LIVER PROFILE:  Recent Labs     02/19/21  1154   AST 54*   ALT 55*   BILITOT 0.4   ALKPHOS 353*       Imaging/Diagnostics Ct Head Wo Contrast    Result Date: 2/19/2021  No acute intracranial abnormality. Signed by Dr Denisse Pearson on 2/19/2021 2:38 PM    Ct Chest W Contrast    Result Date: 2/19/2021  1. Right pleural effusion with compressive atelectasis of the right middle and lower lobes. 2. Right upper lobe with a pleural-based 1.7 x 2.0 cm opacity, which could represent round atelectasis, scarring or nodule. Recommend follow-up CT chest in 3 months per Fleischner criteria. 3. Left subclavian vein with a short segment severe stenosis, just lateral to the stent. Left chest wall collaterals suggest chronicity. 4. Borderline heart size with scattered coronary artery calcifications. 5. Wedge-shaped low-density in the spleen concerning for age-indeterminate splenic infarct. 6. Low-density of the liver which could be seen with fatty infiltration or exaggerated due to phase of contrast. Small volume ascites. Signed by Dr Stu Drew on 2/19/2021 2:42 PM    Ct Abdomen Pelvis W Iv Contrast Additional Contrast? None    Result Date: 2/19/2021  1. Ascites and mesenteric edema Limited evaluation of the peripancreatic fat. Normal enhancement of the pancreas. 2. Wedge-shaped hypodensity in the spleen concerning for age-indeterminate infarct. Splenic vein and artery appear patent and normal caliber. 3. Enlarged low-density liver concerning for fatty liver. 4. Right pleural effusion, ascites, body wall edema suggests fluid overload. 5. Heavily calcified atherosclerosis. 6. Subacute to chronic appearing pubic rami fractures. 7. See separately dictated CT chest of the same day. Signed by Dr Stu Drew on 2/19/2021 2:54 PM    Xr Chest Portable    Result Date: 2/19/2021  1. Interstitial edema with large layering right pleural effusion.  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 She is very disoriented and only groans when examined. She does seem to have abdominal tenderness but difficult to assess given her mental status. No obvious pancreatitis noted on CT scan. The degree of lipase elevation is not likely due to underlying renal disease and today is much improved. Lactic acid normal on admission which would make ischemic boweless likely as well. She has had multiple stool studies sent which are pending. Would continue aggressive supportive care at this time. Would also obtain US abdomen when volume overload corrected and mesenteric edema improved which hopefull will allow for a better evaluation of the pancrease. She does have chronic elevation of Alkaline phosphatase. No evidence of cirrhosis or portal hypertension on CT scan and ammonia level is normal.  Further recommendations to follow stool and additional lab results. Plan   1. Continue aggressive supportive care  2. Await stool studies and additional serological studies. 3.  US when volume overload corrected and better visualization of the pancreas can be obtained. 4.  Will follow.      Electronically signed by Jaqcueline Boothe MD on 2/20/21 at 2:11 PM CST

## 2021-02-20 NOTE — CONSULTS
Nephrology ([facility]) Consult Note      Patient:  Lashawn Padilla  YOB: 1960  Date of Service: 2/20/2021  MRN: [id number]   Acct: [de-identified]   Primary Care Physician: RAMONA Ferraro  Advance Directive: Full Code  Admit Date: 2/19/2021       Hospital Day: 1  Referring Provider: Travon Cali MD    Patient independently seen and examined, Chart, Consults, Notes, Operative notes, Labs, Cardiology, and Radiology studies reviewed as available. Chief complaint: Increasing shortness of breath/end-stage renal disease    Subjective:  Lashawn Padilla is a 56 y.o. female  whom we were consulted for end-stage renal disease. Patient goes to [facility] dialysis clinic on Tuesday Thursday Saturday however she has skipped treatment on Thursday afternoon. She presented to emergency room on Friday morning with increasing weakness, shortness of breath. She also reports some diarrhea. Incidental finding of the labs shows potassium was 6.5 mmol. She was also clinically volume overload. She has received emergent dialysis treatment on Friday. This morning she is getting routine dialysis treatment.     Currently seen on hemodialysis  Hemodialysis access: AV fistula  Hemodialysis: 3-1/2-hour  Ultrafiltration: 3000 cc  2K bath  Blood flow rate is 450 cc/min    Allergies:  Bupropion, Gabapentin, Sulfa antibiotics, Varenicline, Wellbutrin [bupropion hcl], Azithromycin, Levaquin [levofloxacin], and Penicillins    Medicines:  Current Facility-Administered Medications   Medication Dose Route Frequency Provider Last Rate Last Admin    famotidine (PEPCID) tablet 10 mg  10 mg Oral Daily Travon Cali MD        ipratropium-albuterol (DUONEB) nebulizer solution 1 ampule  1 ampule Inhalation Q4H WA Travon Cali MD   1 ampule at 02/20/21 0622    aspirin EC tablet 81 mg  81 mg Oral Daily Travon Cali MD  albuterol (PROVENTIL) nebulizer solution 2.5 mg  2.5 mg Nebulization Q6H PRN Crystal Boston MD        sevelamer (RENVELA) tablet 800 mg  800 mg Oral Daily Crystal Boston MD        glucose (GLUTOSE) 40 % oral gel 15 g  15 g Oral PRN Crystal Boston MD        dextrose 50 % IV solution  12.5 g Intravenous PRN Crystal Boston MD   12.5 g at 02/19/21 2110    glucagon (rDNA) injection 1 mg  1 mg Intramuscular PRN Crystal Boston MD        dextrose 5 % solution  100 mL/hr Intravenous PRN Crystal Boston MD 50 mL/hr at 02/20/21 0054 50 mL/hr at 02/20/21 0054    sodium chloride flush 0.9 % injection 10 mL  10 mL Intravenous 2 times per day Crystal Boston MD   10 mL at 02/20/21 0246    sodium chloride flush 0.9 % injection 10 mL  10 mL Intravenous PRN Crystal Boston MD        potassium chloride (KLOR-CON M) extended release tablet 40 mEq  40 mEq Oral PRN Crystal Boston MD        Or    potassium bicarb-citric acid (EFFER-K) effervescent tablet 40 mEq  40 mEq Oral PRN Crystal Boston MD        Or    potassium chloride 10 mEq/100 mL IVPB (Peripheral Line)  10 mEq Intravenous PRN Crystal Boston MD        promethazine (PHENERGAN) tablet 12.5 mg  12.5 mg Oral Q6H PRN Crystal Boston MD        Or    ondansetron [facility]) injection 4 mg  4 mg Intravenous Q6H PRN Crystal Boston MD        magnesium hydroxide (MILK OF MAGNESIA) 400 MG/5ML suspension 30 mL  30 mL Oral Daily PRN Crystal Boston MD        insulin lispro (HUMALOG) injection vial 0-6 Units  0-6 Units Subcutaneous TID WC Crystal Boston MD        insulin lispro (HUMALOG) injection vial 0-3 Units  0-3 Units Subcutaneous Nightly Crystal Boston MD        heparin (porcine) injection 5,000 Units  5,000 Units Subcutaneous 3 times per day Crystal Boston MD   Stopped at 02/20/21 7845  sodium chloride flush 0.9 % injection 10 mL  10 mL Intravenous 2 times per day Nay Azevedo MD   10 mL at 02/20/21 0247    sodium chloride flush 0.9 % injection 10 mL  10 mL Intravenous PRN Nay Azevedo MD           Past Medical History:  Past Medical History:   Diagnosis Date    Blind right eye     partial vision loss in the L eye too, due to DM    Blindness of one eye     Right     [facility]estive [facility] failure[facility] [address]     CKD (chronic kidney disease), stage IV (HCC)     secondary to diabetic nephropathy    Diabetes ([facility] [address]     Diabetes mellitus with ophthalmic manifestations     dx'd in 2000 but likely ongoing before that, was dx'd when she went blind in the R eye    Glaucoma     Hemodialysis patient [facility])     dialysis tues, thurs, sat. [facility]    Hypertension     2000, dx'd at same time as the DM    Obesity     Renal osteodystrophy     Smoker     Type II or unspecified type diabetes mellitus with renal manifestations, uncontrolled(250.42)        Past Surgical History:  Past Surgical History:   Procedure Laterality Date    CARPAL TUNNEL RELEASE      CATARACT REMOVAL     [address], and 1979    CHOLECYSTECTOMY      COLONOSCOPY N/A 3/9/2018    Dr ReevesLkpbnk-Qvzhpuvrtsjzsx-Iwxlrmszentba AP (-) dysplasia x 1, tubular AP x  (-) dysplasia x 1, HP x 4--1 yr recall    DIALYSIS FISTULA CREATION Left 4/23/15 [facility]    Left proximal ulnar artery to cephalic vein AVF creation    EYE SURGERY      laser, several times     TYMPANOSTOMY TUBE PLACEMENT Bilateral     VASCULAR SURGERY Left 5/11/15 [facility]    US-guided cannulation left distal upper arm cephalic vein with 4-Citizen of [place] glide sheath; LUE AV f'grams with venography SVC; left cephalic vein BAM with [id number] julieth balloon; completion venogram LUE    VASCULAR SURGERY Left 5/28/15 [facility] Percutaneous cannulation left distal radial artery with 4-[place]alese glide sheath; LUE AV f'grams with venography SVC; left cephalic vein balloon a'plasty with [id number] julieth balloon and [id number] julieth balloon; proximal and mid upper arm cephalic vein BAM with [id number] julieth balloon; completion venogram [facility]    VASCULAR SURGERY Left 6/15/15 [facility]    US-guided cannulation left cephalic vein with 4-Nepalese and later 7-Nepalese sheath; LUE AV f'grams including venography SVC; left cephalic vein stenosis balloon a'plasty with [id number] cutting balloon; completion f'gram and venogram [facility]    VASCULAR SURGERY  05/15/2017    [facility]. Left upper fistulograms/venograms.  VASCULAR SURGERY  02/14/2018    [facility]. Left upper fistulograms, left subclavian BA 12x40 atlas, left cephalic arch BA [id number] conquest.    VASCULAR SURGERY  02/13/2019    [facility]. Left upper fistulograms,BA left SCV 8x40 conquest,stent left SCV, viabahn 13x50,left SCV stent BA 12x40 conquest.       Family History  Family History   Problem Relation Age of Onset    Heart Disease Mother     Heart Attack Mother     Glaucoma Sister     Diabetes Sister     Diabetes Brother     Kidney Disease Brother     Blindness Brother     Stroke Maternal Grandmother     Stroke Maternal Grandfather     Colon Cancer Neg Hx     Colon Polyps Neg Hx     Liver Cancer Neg Hx     Liver Disease Neg Hx     Esophageal Cancer Neg Hx     Rectal Cancer Neg Hx     Stomach Cancer Neg Hx        Social History  Social History     Socioeconomic History    Marital status:      Spouse name: Not on file    Number of children: Not on file    Years of education: Not on file    Highest education level: Not on file   Occupational History    Not on file   Social Needs    Financial resource strain: Not on file    Food insecurity     Worry: Not on file     Inability: Not on file    Transportation needs     Medical: Not on file     Non-medical: Not on file   Tobacco Use Cultures:   No results for input(s): CULTURE in the last 72 hours. No results for input(s): BC, Shae Karolyn in the last 72 hours. No results for input(s): CXSURG in the last 72 hours. Radiology reports as per the Radiologist  Radiology: Ct Head Wo Contrast    Result Date: 2/19/2021  Examination. CT HEAD WO CONTRAST 2/19/2021 12:55 PM History: Altered mental status. DLP: 1305 mGycm. The CT scan of the head is performed without intravenous contrast enhancement. The images are acquired in axial plane and subsequent reconstruction in coronal and sagittal planes. The comparison is made with the previous study dated 9/1/2020. There is no evidence of mass. No midline shift. There is no evidence of an intracranial hemorrhage or hematoma. Moderately dilated ventricles, basal cistern and the cortical sulci are similar to the previous study suggesting a chronic volume loss. A few scattered areas of chronic white matter ischemia in the centrum semiovale bilaterally are stable. The gray-white matter differentiation is maintained. A partially empty sella turcica is seen. The images reviewed in bone window show no acute bony abnormality. The visualized paranasal sinuses and mastoid air cells are clear. A curvilinear metallic density in the lateral margin of the left globe/orbit is similar to the previous study. No acute intracranial abnormality.  Signed by Dr Jose Raul Ahumada on 2/19/2021 2:38 PM    Ct Chest W Contrast    Result Date: 2/19/2021 EXAM: CT CHEST W CONTRAST -- 2/19/2021 12:55 PM HISTORY: 56 years, Female, abnormal chest x-ray COMPARISON: 2/19/2021 DLP: 769.5 mGy cm. Automated exposure control was utilized to minimize patient radiation dose. TECHNIQUE: Enhanced  CT images of the chest obtained with multiplanar reformats. FINDINGS: AIRWAYS/PULMONARY PARENCHYMA: The central airways are midline and patent. Expiratory phase of imaging. Small right pleural effusion with compressive enhancing breast atelectasis involving the right middle and lower lobes. Right upper lobe with a pleural-based 1.7 x 2.0 cm opacity, which could represent round atelectasis, scarring or nodule. VASCULATURE: Thoracic aorta is normal in course and caliber. Mild calcified aortic atherosclerosis. Normal pulmonary artery caliber. No large central pulmonary artery filling defect on this non-CTA exam. Left subclavian vein with short segment severe stenosis on axial image 29. This is just lateral to the stent. There are left chest wall collaterals suggesting that this is a chronic finding. CARDIAC:  Borderline heart size. No pericardial effusion. Scattered coronary artery calcifications. MEDIASTINUM: There is no mediastinal or hilar lymphadenopathy by CT size criteria. Esophagus has normal coarse, caliber and wall thickness. EXTRATHORACIC: The visualized portions of the thyroid gland are unremarkable. No thoracic inlet or axillary adenopathy. Body wall edema. Left chest wall collaterals. INCLUDED UPPER ABDOMEN: The liver appears prominent low density, which could be due to fatty infiltration or phase of contrast. Cholecystectomy clips. No bile duct dilation. Small ascites. There is wedge-shaped low-density in the spleen, concerning for age-indeterminate splenic infarct. Visualized portion of the pancreas and adrenal glands appear within normal limits. Atrophic kidneys partially visualized. OSSEOUS: Mild degenerative changes in the spine. No acute or aggressive bony finding. 1. Right pleural effusion with compressive atelectasis of the right middle and lower lobes. 2. Right upper lobe with a pleural-based 1.7 x 2.0 cm opacity, which could represent round atelectasis, scarring or nodule. Recommend follow-up CT chest in 3 months per Fleischner criteria. 3. Left subclavian vein with a short segment severe stenosis, just lateral to the stent. Left chest wall collaterals suggest chronicity. 4. Borderline heart size with scattered coronary artery calcifications. 5. Wedge-shaped low-density in the spleen concerning for age-indeterminate splenic infarct. 6. Low-density of the liver which could be seen with fatty infiltration or exaggerated due to phase of contrast. Small volume ascites.  Signed by Dr Chrissy Salcido on 2/19/2021 2:42 PM    Ct Abdomen Pelvis W Iv Contrast Additional Contrast? None    Result Date: 2/19/2021 criteria, which may be reactive. ABDOMINAL WALL: Significant body wall edema. Prominent vessel in the partially visualized left distal upper arm, possibly fistula. OSSEOUS: Subacute to chronic right superior pubic ramus, right inferior pubic ramus and left inferior pubic rami fractures. 1. Ascites and mesenteric edema Limited evaluation of the peripancreatic fat. Normal enhancement of the pancreas. 2. Wedge-shaped hypodensity in the spleen concerning for age-indeterminate infarct. Splenic vein and artery appear patent and normal caliber. 3. Enlarged low-density liver concerning for fatty liver. 4. Right pleural effusion, ascites, body wall edema suggests fluid overload. 5. Heavily calcified atherosclerosis. 6. Subacute to chronic appearing pubic rami fractures. 7. See separately dictated CT chest of the same day. Signed by Dr Alexys Clark on 2/19/2021 2:54 PM    Xr Chest Portable    Result Date: 2/19/2021  XR CHEST PORTABLE 2/19/2021 10:18 AM HISTORY: Shortness of breath COMPARISON: Chest x-ray dated 10/10/2020. FINDINGS: Mild cardiomegaly which is stable. Bilateral coarsened interstitial markings with large layering right pleural effusion. No pneumothorax. Vascular stent along the left brachiocephalic vein. No acute bony abnormality. 1. Interstitial edema with large layering right pleural effusion. Signed by Dr Farzaneh Calix on 2/19/2021 11:44 AM       Assessment   1. End-stage renal disease/currently seen on hemodialysis  2. Type II diabetic nephropathy. 3.  Noncompliant dialysis patient. 4.  Anemia of chronic kidney disease. 5.  Hyperkalemia now resolved. 6.  Clinically volume overloaded  7. Right-sided pleural effusion/large. Plan:  1. Tolerating dialysis very well. 2.  Resume SHADY. Thank you for the consult, we appreciate the opportunity to provide care to your patients. Feel free to contact me if I can be of any further assistance.       Rico Ma MD  02/20/21  10:30 AM

## 2021-02-20 NOTE — PROGRESS NOTES
Pharmacy Note  Vancomycin Consult    Renny Man is a 56 y.o. female started on Vancomycin for sepsis of unknown etiology; consult received from Dr. Jayne Conte to manage therapy. Also receiving the following antibiotics: Merrem. Active Problems:    Type 2 diabetes mellitus with ophthalmic complication, with long-term current use of insulin ([facility])    HTN (hypertension)    ESRD (end stage renal disease) on dialysis ([facility])    Acute diarrhea    Elevated lipase  Resolved Problems:    * No resolved hospital problems. *      Allergies:  Bupropion, Gabapentin, Sulfa antibiotics, Varenicline, Wellbutrin [bupropion hcl], Azithromycin, Levaquin [levofloxacin], and Penicillins     Temp max: 97.6    Recent Labs     02/19/21  1154 02/20/21  0227   BUN 77* 47*       Recent Labs     02/19/21  1154 02/20/21  0227   CREATININE 7.2* 5.0*       Recent Labs     02/19/21  1154 02/20/21  0227   WBC 3.7* 3.2*         Intake/Output Summary (Last 24 hours) at 2/20/2021 1136  Last data filed at 2/20/2021 1007  Gross per 24 hour   Intake 0 ml   Output    Net 0 ml       Culture Date Source Results   02/19/21 Blood Sent                 Ht Readings from Last 1 Encounters:   11/11/20 4' 11\" (1.499 m)        Wt Readings from Last 1 Encounters:   02/19/21 145 lb 1.6 oz (65.8 kg)         Body mass index is 29.31 kg/m². CrCl cannot be calculated (Unknown ideal weight. ). Assessment/Plan:  Will initiate vancomycin pulse dosing with 1000 mg IV x 1 dose. Random Vancomycin level after next HD treatment. Thank you for the consult. Will continue to follow.     Electronically signed by Crystal Moyer RPh, BCPS, 2/20/2021,11:41 AM

## 2021-02-20 NOTE — PROGRESS NOTES
Pharmacy Renal Adjustment    Ted Seth is a 56 y.o. female. Pharmacy has renally adjusted medications per protocol. Recent Labs     02/19/21  1154 02/20/21 0227   BUN 77* 47*       Recent Labs     02/19/21  1154 02/20/21 0227   CREATININE 7.2* 5.0*       CrCl cannot be calculated (Unknown ideal weight.).     Height:   Ht Readings from Last 1 Encounters:   11/11/20 4' 11\" (1.499 m)     Weight:  Wt Readings from Last 1 Encounters:   11/11/20 147 lb (66.7 kg)       CKD stage: ESRD on HD    Plan: Adjust the following medications based on renal function:           Famotidine 20 mg by mouth twice daily to 10 mg once daily    Electronically signed by Adeel Gillette RPH on 2/20/2021 at 4:16 AM

## 2021-02-21 ENCOUNTER — APPOINTMENT (OUTPATIENT)
Dept: ULTRASOUND IMAGING | Age: 61
DRG: 291 | End: 2021-02-21
Payer: MEDICARE

## 2021-02-21 ENCOUNTER — APPOINTMENT (OUTPATIENT)
Dept: GENERAL RADIOLOGY | Age: 61
DRG: 291 | End: 2021-02-21
Payer: MEDICARE

## 2021-02-21 PROBLEM — S32.592A BILATERAL PUBIC RAMI FRACTURES, CLOSED, INITIAL ENCOUNTER (HCC): Status: ACTIVE | Noted: 2021-02-21

## 2021-02-21 PROBLEM — S32.591A BILATERAL PUBIC RAMI FRACTURES, CLOSED, INITIAL ENCOUNTER (HCC): Status: ACTIVE | Noted: 2021-02-21

## 2021-02-21 LAB
ALBUMIN SERPL-MCNC: 4.4 G/DL (ref 3.5–5.2)
ALP BLD-CCNC: 297 U/L (ref 35–104)
ALT SERPL-CCNC: 37 U/L (ref 5–33)
ANION GAP SERPL CALCULATED.3IONS-SCNC: 19 MMOL/L (ref 7–19)
AST SERPL-CCNC: 37 U/L (ref 5–32)
BASOPHILS ABSOLUTE: 0 K/UL (ref 0–0.2)
BASOPHILS RELATIVE PERCENT: 0.7 % (ref 0–1)
BILIRUB SERPL-MCNC: 0.5 MG/DL (ref 0.2–1.2)
BUN BLDV-MCNC: 22 MG/DL (ref 8–23)
CALCIUM SERPL-MCNC: 9.3 MG/DL (ref 8.8–10.2)
CHLORIDE BLD-SCNC: 94 MMOL/L (ref 98–111)
CO2: 26 MMOL/L (ref 22–29)
CREAT SERPL-MCNC: 3.3 MG/DL (ref 0.5–0.9)
EOSINOPHILS ABSOLUTE: 0.2 K/UL (ref 0–0.6)
EOSINOPHILS RELATIVE PERCENT: 3.5 % (ref 0–5)
GFR AFRICAN AMERICAN: 17
GFR NON-AFRICAN AMERICAN: 14
GLUCOSE BLD-MCNC: 100 MG/DL (ref 70–99)
GLUCOSE BLD-MCNC: 105 MG/DL (ref 70–99)
GLUCOSE BLD-MCNC: 117 MG/DL (ref 70–99)
GLUCOSE BLD-MCNC: 134 MG/DL (ref 70–99)
GLUCOSE BLD-MCNC: 137 MG/DL (ref 74–109)
HCT VFR BLD CALC: 34.5 % (ref 37–47)
HEMOGLOBIN: 10.8 G/DL (ref 12–16)
IMMATURE GRANULOCYTES #: 0 K/UL
LYMPHOCYTES ABSOLUTE: 0.6 K/UL (ref 1.1–4.5)
LYMPHOCYTES RELATIVE PERCENT: 13.4 % (ref 20–40)
MAGNESIUM: 2.3 MG/DL (ref 1.6–2.4)
MCH RBC QN AUTO: 31.5 PG (ref 27–31)
MCHC RBC AUTO-ENTMCNC: 31.3 G/DL (ref 33–37)
MCV RBC AUTO: 100.6 FL (ref 81–99)
MONOCYTES ABSOLUTE: 0.5 K/UL (ref 0–0.9)
MONOCYTES RELATIVE PERCENT: 12 % (ref 0–10)
NEUTROPHILS ABSOLUTE: 3 K/UL (ref 1.5–7.5)
NEUTROPHILS RELATIVE PERCENT: 69.9 % (ref 50–65)
PDW BLD-RTO: 17.9 % (ref 11.5–14.5)
PERFORMED ON: ABNORMAL
PLATELET # BLD: 110 K/UL (ref 130–400)
PMV BLD AUTO: 10.5 FL (ref 9.4–12.3)
POTASSIUM REFLEX MAGNESIUM: 3.5 MMOL/L (ref 3.5–5)
RBC # BLD: 3.43 M/UL (ref 4.2–5.4)
SODIUM BLD-SCNC: 139 MMOL/L (ref 136–145)
TOTAL PROTEIN: 7.1 G/DL (ref 6.6–8.7)
WBC # BLD: 4.3 K/UL (ref 4.8–10.8)

## 2021-02-21 PROCEDURE — 6360000002 HC RX W HCPCS: Performed by: INTERNAL MEDICINE

## 2021-02-21 PROCEDURE — 1210000000 HC MED SURG R&B

## 2021-02-21 PROCEDURE — 99232 SBSQ HOSP IP/OBS MODERATE 35: CPT | Performed by: INTERNAL MEDICINE

## 2021-02-21 PROCEDURE — 76700 US EXAM ABDOM COMPLETE: CPT

## 2021-02-21 PROCEDURE — 2580000003 HC RX 258: Performed by: INTERNAL MEDICINE

## 2021-02-21 PROCEDURE — 82947 ASSAY GLUCOSE BLOOD QUANT: CPT

## 2021-02-21 PROCEDURE — 85025 COMPLETE CBC W/AUTO DIFF WBC: CPT

## 2021-02-21 PROCEDURE — 83735 ASSAY OF MAGNESIUM: CPT

## 2021-02-21 PROCEDURE — 6370000000 HC RX 637 (ALT 250 FOR IP): Performed by: INTERNAL MEDICINE

## 2021-02-21 PROCEDURE — 86255 FLUORESCENT ANTIBODY SCREEN: CPT

## 2021-02-21 PROCEDURE — 80053 COMPREHEN METABOLIC PANEL: CPT

## 2021-02-21 PROCEDURE — 86038 ANTINUCLEAR ANTIBODIES: CPT

## 2021-02-21 PROCEDURE — 94640 AIRWAY INHALATION TREATMENT: CPT

## 2021-02-21 PROCEDURE — 72170 X-RAY EXAM OF PELVIS: CPT

## 2021-02-21 PROCEDURE — 83516 IMMUNOASSAY NONANTIBODY: CPT

## 2021-02-21 PROCEDURE — 36415 COLL VENOUS BLD VENIPUNCTURE: CPT

## 2021-02-21 RX ADMIN — Medication 1 MG: at 00:40

## 2021-02-21 RX ADMIN — Medication 1 MG: at 13:44

## 2021-02-21 RX ADMIN — Medication 1 MG: at 09:30

## 2021-02-21 RX ADMIN — ONDANSETRON HYDROCHLORIDE 4 MG: 2 SOLUTION INTRAMUSCULAR; INTRAVENOUS at 02:24

## 2021-02-21 RX ADMIN — IPRATROPIUM BROMIDE AND ALBUTEROL SULFATE 1 AMPULE: .5; 3 SOLUTION RESPIRATORY (INHALATION) at 11:01

## 2021-02-21 RX ADMIN — Medication 10 ML: at 22:07

## 2021-02-21 RX ADMIN — IPRATROPIUM BROMIDE AND ALBUTEROL SULFATE 1 AMPULE: .5; 3 SOLUTION RESPIRATORY (INHALATION) at 06:10

## 2021-02-21 RX ADMIN — Medication 1 MG: at 22:09

## 2021-02-21 RX ADMIN — IPRATROPIUM BROMIDE AND ALBUTEROL SULFATE 1 AMPULE: .5; 3 SOLUTION RESPIRATORY (INHALATION) at 15:13

## 2021-02-21 RX ADMIN — MEROPENEM 500 MG: 500 INJECTION, POWDER, FOR SOLUTION INTRAVENOUS at 13:44

## 2021-02-21 RX ADMIN — IPRATROPIUM BROMIDE AND ALBUTEROL SULFATE 1 AMPULE: .5; 3 SOLUTION RESPIRATORY (INHALATION) at 18:07

## 2021-02-21 ASSESSMENT — PAIN SCALES - GENERAL: PAINLEVEL_OUTOF10: 10

## 2021-02-21 NOTE — PROGRESS NOTES
[facility] Progress Note    Patient:  Tanvir Frederick  YOB: 1960  Date of Service: 2/21/2021  MRN: [id number]   Acct: [de-identified]   Primary Care Physician: RAMONA Hammer  Advance Directive: Full Code  Admit Date: 2/19/2021       Hospital Day: 2        CHIEF COMPLAINT:    Chief Complaint   Patient presents with    Diarrhea     pt missed dialysis on Thursday.  Failure To Thrive       2/21/2021 9:23 AM  Subjective / Interval History:   02/21/2021  Patient seen and examined this AM.  Reportedly agitated with continued moaning however with no specific complaint overnight. No changes in mental status. Patient more awake and alert this a.m. though. Laying comfortably in bed in no apparent acute distress. 02/20/2021  Patient seen and and examined. Continues to be confused with intermittent jerky movements in upper extremity. No other acute changes or acute overnight event reported. Tolerated HD yesterday, and currently receiving a second session of HD. Review of Systems:   Review of Systems   Unable to perform ROS: Mental status change     Diet NPO, After Midnight  DIET CARB CONTROL;  Renal    Intake/Output Summary (Last 24 hours) at 2/21/2021 1468  Last data filed at 2/20/2021 1007  Gross per 24 hour   Intake 0 ml   Output    Net 0 ml       Medications:   dextrose 50 mL/hr (02/20/21 0054)     Current Facility-Administered Medications   Medication Dose Route Frequency Provider Last Rate Last Admin    famotidine (PEPCID) tablet 10 mg  10 mg Oral Daily Tirso Jones MD        darbepoetin angie-polysorbate (ARANESP) injection 60 mcg  60 mcg Subcutaneous Weekly Ness Rdz MD   60 mcg at 02/20/21 1247    meropenem (MERREM) 500 mg in sterile water 10 mL IV syringe  500 mg Intravenous Q24H Tirso Jones MD   500 mg at 02/20/21 1635    vancomycin (VANCOCIN) intermittent dosing (placeholder)   Other RX Haley Lopez MD  oxyCODONE-acetaminophen (PERCOCET) 5-325 MG per tablet 1 tablet  1 tablet Oral Q6H PRN Mariam Pena MD   1 tablet at 02/20/21 1648    morphine injection 1 mg  1 mg Intravenous Q4H PRN Mariam Pena MD   1 mg at 02/21/21 0040    ipratropium-albuterol (Syed Bring) nebulizer solution 1 ampule  1 ampule Inhalation Q4H WA Mariam Pena MD   1 ampule at 02/21/21 0610    aspirin EC tablet 81 mg  81 mg Oral Daily Mariam Pena MD        albuterol (PROVENTIL) nebulizer solution 2.5 mg  2.5 mg Nebulization Q6H PRN Mariam Pena MD        sevelamer (RENVELA) tablet 800 mg  800 mg Oral Daily Mariam Pena MD        glucose (GLUTOSE) 40 % oral gel 15 g  15 g Oral PRN Mariam Pena MD        dextrose 50 % IV solution  12.5 g Intravenous PRN Mariam Pena MD   12.5 g at 02/19/21 2110    glucagon (rDNA) injection 1 mg  1 mg Intramuscular PRN Mariam Pena MD        dextrose 5 % solution  100 mL/hr Intravenous PRN Mariam Pena MD 50 mL/hr at 02/20/21 0054 50 mL/hr at 02/20/21 0054    sodium chloride flush 0.9 % injection 10 mL  10 mL Intravenous 2 times per day Mariam Pena MD   10 mL at 02/20/21 0246    sodium chloride flush 0.9 % injection 10 mL  10 mL Intravenous PRN Mariam Pena MD        potassium chloride (KLOR-CON M) extended release tablet 40 mEq  40 mEq Oral PRN Mariam Pena MD        Or    potassium bicarb-citric acid (EFFER-K) effervescent tablet 40 mEq  40 mEq Oral PRN Mariam Pena MD        Or    potassium chloride 10 mEq/100 mL IVPB (Peripheral Line)  10 mEq Intravenous PRN Mariam Pena MD        promethazine (PHENERGAN) tablet 12.5 mg  12.5 mg Oral Q6H PRN Mariam Pena MD        Or    ondansetron [facility]) injection 4 mg  4 mg Intravenous Q6H PRN Mariam Pena MD   4 mg at 02/21/21 3905  magnesium hydroxide (MILK OF MAGNESIA) 400 MG/5ML suspension 30 mL  30 mL Oral Daily PRN Asaf Hoffman MD        insulin lispro (HUMALOG) injection vial 0-6 Units  0-6 Units Subcutaneous TID  Asaf Hoffman MD        insulin lispro (HUMALOG) injection vial 0-3 Units  0-3 Units Subcutaneous Nightly Asaf Hoffman MD        heparin (porcine) injection 5,000 Units  5,000 Units Subcutaneous 3 times per day Asaf Hoffman MD   Stopped at 02/20/21 0057    sodium chloride flush 0.9 % injection 10 mL  10 mL Intravenous 2 times per day Asaf Hoffman MD   10 mL at 02/20/21 0247    sodium chloride flush 0.9 % injection 10 mL  10 mL Intravenous PRN Asaf Hoffman MD             dextrose 50 mL/hr (02/20/21 0054)      famotidine  10 mg Oral Daily    darbepoetin angie-polysorbate  60 mcg Subcutaneous Weekly    meropenem (MERREM) 500 mg in SWFI 10 mL IV syringe  500 mg Intravenous Q24H    vancomycin (VANCOCIN) intermittent dosing (placeholder)   Other RX Placeholder    ipratropium-albuterol  1 ampule Inhalation Q4H WA    aspirin EC  81 mg Oral Daily    sevelamer  800 mg Oral Daily    sodium chloride flush  10 mL Intravenous 2 times per day    insulin lispro  0-6 Units Subcutaneous TID     insulin lispro  0-3 Units Subcutaneous Nightly    heparin (porcine)  5,000 Units Subcutaneous 3 times per day    sodium chloride flush  10 mL Intravenous 2 times per day     oxyCODONE-acetaminophen, morphine, albuterol, glucose, dextrose, glucagon (rDNA), dextrose, sodium chloride flush, potassium chloride **OR** potassium alternative oral replacement **OR** potassium chloride, promethazine **OR** ondansetron, magnesium hydroxide, sodium chloride flush  Diet NPO, After Midnight  DIET CARB CONTROL;  Renal       Labs:   CBC with DIFF:  Recent Labs     02/19/21  1154 02/20/21  0227 02/21/21  0133   WBC 3.7* 3.2* 4.3*   RBC 3.83* 3.09* 3.43*   HGB 11.9* 9.7* 10.8*   HCT 37.7 29.6* 34.5* No results for input(s): CULTURE in the last 72 hours. Recent Labs     02/19/21  1154 02/19/21  1201   BC No Growth to date. Any change in status will be called. --    BLOODCULT2  --  No Growth to date. Any change in status will be called. No results for input(s): CXSURG in the last 72 hours. Recent Labs     02/21/21  0133   MG 2.3     Recent Labs     02/19/21  1154 02/21/21  0133   AST 54* 37*   ALT 55* 37*   BILITOT 0.4 0.5   ALKPHOS 353* 297*         RAD:   Ct Head Wo Contrast    Result Date: 2/19/2021  Examination. CT HEAD WO CONTRAST 2/19/2021 12:55 PM History: Altered mental status. DLP: 1305 mGycm. The CT scan of the head is performed without intravenous contrast enhancement. The images are acquired in axial plane and subsequent reconstruction in coronal and sagittal planes. The comparison is made with the previous study dated 9/1/2020. There is no evidence of mass. No midline shift. There is no evidence of an intracranial hemorrhage or hematoma. Moderately dilated ventricles, basal cistern and the cortical sulci are similar to the previous study suggesting a chronic volume loss. A few scattered areas of chronic white matter ischemia in the centrum semiovale bilaterally are stable. The gray-white matter differentiation is maintained. A partially empty sella turcica is seen. The images reviewed in bone window show no acute bony abnormality. The visualized paranasal sinuses and mastoid air cells are clear. A curvilinear metallic density in the lateral margin of the left globe/orbit is similar to the previous study. No acute intracranial abnormality.  Signed by Dr Alvin Hawthorne on 2/19/2021 2:38 PM    Ct Chest W Contrast    Result Date: 2/19/2021 EXAM: CT ABDOMEN PELVIS W IV CONTRAST -- 2/19/2021 12:55 PM HISTORY: 56 years, Female, markedly elevated lipase, abdominal pain, diarrhea, ESRD on hemodialysis COMPARISON: No existing relevant imaging studies available DLP: 769.5 mGy cm. Automated exposure control was utilized to minimize patient radiation dose. TECHNIQUE: Enhanced CT images obtained of the abdomen and pelvis with multiplanar reformats. FINDINGS: LUNG BASES: Right pleural effusion with compressive atelectasis. Underlying inferior heart size with scattered coronary artery calcifications. LIVER: Diffuse low-density of the liver concerning for fatty liver. Liver appears mildly enlarged. Patent portal and hepatic veins. GALLBLADDER and BILARY: Cholecystectomy clips. No bile duct dilation. PANCREAS: Normal enhancement of the pancreas. There is ascites and mesenteric edema throughout the abdomen, which limited evaluation of the peripancreatic fat. SPLEEN: There is a wedge-shaped hypodensity in the spleen concerning for infarct. Splenic vein appears grossly patent. Normal caliber splenic artery. ADRENALS: No adrenal mass. URINARY: Atrophic kidneys. No hydronephrosis. Renal vascular calcifications. No convincing nephrolithiasis. Urinary bladder is underdistended, which limits evaluation. Grossly normal CT appearance of the uterus. Adnexa are not discretely identified. PERITONEUM: No ascites/free fluid. No pneumoperitoneum. BOWEL: Stomach and duodenum have normal course and caliber. No abnormally dilated loops of bowel or bowel wall thickening. Normal short appendix or appendiceal stump on axial images 61-57. RETROPERITONEUM:  Aorta normal in course and caliber with calcified atherosclerosis. Celiac and superior mesenteric arteries patent. Bilateral renal arteries diminutive. Inferior mesenteric artery opacified.  Heavily calcified iliac arteries, limited in evaluation on this non-CTA exam. Scattered retroperitoneal lymph nodes, not enlarged by CT size criteria, which may be reactive. ABDOMINAL WALL: Significant body wall edema. Prominent vessel in the partially visualized left distal upper arm, possibly fistula. OSSEOUS: Subacute to chronic right superior pubic ramus, right inferior pubic ramus and left inferior pubic rami fractures. 1. Ascites and mesenteric edema Limited evaluation of the peripancreatic fat. Normal enhancement of the pancreas. 2. Wedge-shaped hypodensity in the spleen concerning for age-indeterminate infarct. Splenic vein and artery appear patent and normal caliber. 3. Enlarged low-density liver concerning for fatty liver. 4. Right pleural effusion, ascites, body wall edema suggests fluid overload. 5. Heavily calcified atherosclerosis. 6. Subacute to chronic appearing pubic rami fractures. 7. See separately dictated CT chest of the same day. Signed by Dr Alvin Ryder on 2/19/2021 2:54 PM    Xr Chest Portable    Result Date: 2/19/2021  XR CHEST PORTABLE 2/19/2021 10:18 AM HISTORY: Shortness of breath COMPARISON: Chest x-ray dated 10/10/2020. FINDINGS: Mild cardiomegaly which is stable. Bilateral coarsened interstitial markings with large layering right pleural effusion. No pneumothorax. Vascular stent along the left brachiocephalic vein. No acute bony abnormality. 1. Interstitial edema with large layering right pleural effusion. Signed by Dr Tina Mesa on 2/19/2021 11:44 AM      Recent 2D ECHOCARDIOGRAM  05/08/2020    Summary   LV is normal in size with normal systolic function. LV ejection fraction   estimated at 50-60%.  Mild concentric LVH. Probable grade 2 diastolic dysfunction.   RV is normal in size with normal systolic function.   Mild to moderate left atrial enlargement.   Normal right atrial size.   Aortic valve is trileaflet with mild leaflet thickening. Normal opening.   No stenosis or regurgitation noted.   Mitral valve is mild to moderately thickened with normal leaflet mobility. Pulses: Normal pulses. Heart sounds: Normal heart sounds. No friction rub. No gallop. Pulmonary:      Effort: Pulmonary effort is normal. No respiratory distress. Breath sounds: Normal breath sounds. No stridor. No wheezing, rhonchi or rales. Comments: Diminished breath sounds bilaterally  Chest:      Chest wall: No tenderness. Abdominal:      General: Bowel sounds are normal. There is no distension. Palpations: Abdomen is soft. Tenderness: There is abdominal tenderness. There is no guarding or rebound. Comments: Diffuse tenderness to palpation. No guarding, rigidity, rebound tenderness noted/elicited   Musculoskeletal: Normal range of motion. General: No swelling, tenderness, deformity or signs of injury. Right lower leg: Edema present. Left lower leg: Edema present. Skin:     General: Skin is warm and dry. Capillary Refill: Capillary refill takes less than 2 seconds. Coloration: Skin is not jaundiced or pale. Findings: No bruising, erythema, lesion or rash. Neurological:      General: No focal deficit present. Mental Status: She is alert. She is disoriented. Cranial Nerves: No cranial nerve deficit. Psychiatric:         Mood and Affect: Mood normal.      Comments: Unable to assess judgment and thought content.            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 *        Brief Summary  Ms. Fabiano Henry a 56 y.o. female with a history of DMT2, ESRD, on HD, CHF, presenting to [facility] ED (2021) on account of diarrhea, with an associated abdominal/back pain, generalized weakness and lethargy.     Patient reportedly has missed 1 session of HD, due to the symptoms noted above.     Patient reportedly is on baseline home oxygen.     Initial work-up significant for;  AB.3 ///20 4.8  Hyperkalemia, with a potassium of 5.3  Initial BUN/creatinine of 77/7.2  Hypoglycemia with initial glucose of 67  Leukopenia, with WBC of 3.7  Lactic acid within lab reference range: 0.8  Markedly elevated lipase: 1,492  Mildly elevated ESR: 30 mm/hr  Elevated CRP: 7.45  Molecular respiratory panel with COVID-19 grossly negative  Patient admitted for further work-up and management.     Generalized weakness  Diarrhea  Altered mental status  Transaminitis  · Initial work-up (2021) significant for;  · AB.3 4//72/20 4.8  · Leukopenia, with WBC of 3.7  · Lactic acid within lab reference range: 0.8  · Markedly elevated lipase: 1,492 (2021)  · Mildly elevated ESR: 30 mm/hr  · Elevated CRP: 7.45  · Mildly elevated ALT/AST: 55/54  · Elevated alkaline phosphatase: 353  · Molecular respiratory panel with COVID-19 grossly negative  · Blood cultures no growth to date  · Mildly elevated Procalcitonin level: 0.24 (2021)  · Elevated CRP: 7.45 [0.00 - 0.50 mg/dL]  · Mildly elevated ESR  · Elevated GGT  · Elevated serum ferritin  · Triglyceride level within lab reference range  · Ammonia level  · Acute viral hepatitis panel grossly negative  · HIV rapid 1 & 2 screen negative  · Acetaminophen level within lab reference range · Ethanol level < 10  Mg/dL  · Elevated GGT level  · LDH within lab reference range  · Stool for C. difficile antigen toxin as well as ova and parasites. · Empiric antibiotic (Vancomycin and Meropenem), pending blood cultures  · CT head without contrast, with no acute intracranial abnormality. · CT chest with contrast (02/19/2021): Impression: Right pleural effusion with compressive atelectasis of the right middle and lower lobes. . Right upper lobe with a pleural-based 1.7 x 2.0 cm opacity, which could represent round atelectasis, scarring or nodule. Recommend follow-up CT chest in 3 months per Fleischner criteria. Left subclavian vein with a short segment severe stenosis, just lateral to the stent. Left chest wall collaterals suggest chronicity. . Borderline heart size with scattered coronary artery calcifications. . Wedge-shaped low-density in the spleen concerning for age-indeterminate splenic infarct. Low-density of the liver which could be seen with fatty infiltration or exaggerated due to phase of contrast. Small volume ascites. · CT abdomen pelvis with IV contrast (02/19/2021): Impression: Ascites and mesenteric edema Limited evaluation of the peripancreatic fat. Normal enhancement of the pancreas. Wedge-shaped hypodensity in the spleen concerning for age-indeterminate infarct. Splenic vein and artery appear patent andmnormal caliber. Enlarged low-density liver concerning for fatty liver. Right pleural effusion, ascites, body wall edema suggests fluid overload. Heavily calcified atherosclerosis. Subacute to chronic appearing pubic rami fractures. · F/u US Abdominal complete (02/21/2021)  · Seen by GIrecommendations much appreciated       Subacute to chronic pubic rami fractures.   · Continue supportive symptomatic management including pain management as needed  · Orthopedic surgery consult      History of ESRD, on scheduled HD Tues_Thur_Sat  · Hyperkalemia, with a potassium of 5.3

## 2021-02-21 NOTE — PROGRESS NOTES
Nephrology ([address] [facility]) Progress Note      Patient:  Tanvir Frederick  YOB: 1960  Date of Service: 2/21/2021  MRN: [id number]   Acct: [de-identified]   Primary Care Physician: RAMONA Hammer  Advance Directive: Full Code  Admit Date: 2/19/2021       Hospital Day: 2  Referring Provider: Tirso Jones MD    Patient independently seen and examined, Chart, Consults, Notes, Operative notes, Labs, Cardiology, and Radiology studies reviewed as available. Chief complaint: Increasing shortness of breath/end-stage renal disease    Subjective:  Tanvir Frederick is a 56 y.o. female  whom we were consulted for end-stage renal disease. Patient goes to [facility] dialysis clinic on Tuesday Thursday Saturday however she has skipped treatment on Thursday afternoon. She presented to emergency room on Friday morning with increasing weakness, shortness of breath. She also reports some diarrhea. Incidental finding of the labs shows potassium was 6.5 mmol. She was also clinically volume overload. She has received emergent dialysis treatment on Friday. Yesterday she has received routine hemodialysis treatment. This afternoon she is confused, agitated, denies any shortness of breath.     Allergies:  Bupropion, Gabapentin, Sulfa antibiotics, Varenicline, Wellbutrin [bupropion hcl], Azithromycin, Levaquin [levofloxacin], and Penicillins    Medicines:  Current Facility-Administered Medications   Medication Dose Route Frequency Provider Last Rate Last Admin    famotidine (PEPCID) tablet 10 mg  10 mg Oral Daily Tirso Jones MD        darbepoetin angie-polysorbate (ARANESP) injection 60 mcg  60 mcg Subcutaneous Weekly Ness Rdz MD   60 mcg at 02/20/21 1247    meropenem (MERREM) 500 mg in sterile water 10 mL IV syringe  500 mg Intravenous Q24H Tirso Jones MD   500 mg at 02/21/21 1344  vancomycin (VANCOCIN) intermittent dosing (placeholder)   Other RX Placeholder Yared Brown MD        oxyCODONE-acetaminophen (PERCOCET) 5-325 MG per tablet 1 tablet  1 tablet Oral Q6H PRN Yared Brown MD   1 tablet at 02/20/21 1648    morphine injection 1 mg  1 mg Intravenous Q4H PRN Yared Brown MD   1 mg at 02/21/21 1344    ipratropium-albuterol (DUONEB) nebulizer solution 1 ampule  1 ampule Inhalation Q4H WA Yared Brown MD   1 ampule at 02/21/21 1101    aspirin EC tablet 81 mg  81 mg Oral Daily Yared Brown MD        albuterol (PROVENTIL) nebulizer solution 2.5 mg  2.5 mg Nebulization Q6H PRN Yared Brown MD        sevelamer (RENVELA) tablet 800 mg  800 mg Oral Daily Yared Brown MD        glucose (GLUTOSE) 40 % oral gel 15 g  15 g Oral PRN Yared Brown MD        dextrose 50 % IV solution  12.5 g Intravenous PRN Yared Brown MD   12.5 g at 02/19/21 2110    glucagon (rDNA) injection 1 mg  1 mg Intramuscular PRN Yared Brown MD        dextrose 5 % solution  100 mL/hr Intravenous PRN Yared Brown MD 50 mL/hr at 02/20/21 0054 50 mL/hr at 02/20/21 0054    sodium chloride flush 0.9 % injection 10 mL  10 mL Intravenous 2 times per day Yared Brown MD   10 mL at 02/20/21 0246    sodium chloride flush 0.9 % injection 10 mL  10 mL Intravenous PRN Yared Brown MD        potassium chloride (KLOR-CON M) extended release tablet 40 mEq  40 mEq Oral PRN Yared Brown MD        Or    potassium bicarb-citric acid (EFFER-K) effervescent tablet 40 mEq  40 mEq Oral PRN Yared Brown MD        Or    potassium chloride 10 mEq/100 mL IVPB (Peripheral Line)  10 mEq Intravenous PRN Yared Brown MD        promethazine (PHENERGAN) tablet 12.5 mg  12.5 mg Oral Q6H PRN Yared Brown MD        Or    ondansetron [facility]) injection 4 mg  4 mg Intravenous Q6H PRN Yared Brown MD   4 mg at 02/21/21 0352  Number of children: Not on file    Years of education: Not on file    Highest education level: Not on file   Occupational History    Not on file   Social Needs    Financial resource strain: Not on file    Food insecurity     Worry: Not on file     Inability: Not on file    Transportation needs     Medical: Not on file     Non-medical: Not on file   Tobacco Use    Smoking status: Current Every Day Smoker     Packs/day: 1.00     Years: 40.00     Pack years: 40.00    Smokeless tobacco: Never Used   Substance and Sexual Activity    Alcohol use: No     Alcohol/week: 0.0 standard drinks    Drug use: Yes     Types: Marijuana    Sexual activity: Not Currently     Partners: Male   Lifestyle    Physical activity     Days per week: Not on file     Minutes per session: Not on file    Stress: Not on file   Relationships    Social connections     Talks on phone: Not on file     Gets together: Not on file     Attends [employer] service: Not on file     Active member of club or organization: Not on file     Attends meetings of clubs or organizations: Not on file     Relationship status: Not on file    Intimate partner violence     Fear of current or ex partner: Not on file     Emotionally abused: Not on file     Physically abused: Not on file     Forced sexual activity: Not on file   Other Topics Concern    Not on file   Social History Narrative    Not on file         Review of Systems:  Unable to obtain as she is confused and disoriented.       Objective:  Patient Vitals for the past 24 hrs:   BP Temp Temp src Pulse Resp SpO2   02/21/21 1159 135/76 97.6 °F (36.4 °C)  74 20 91 %   02/21/21 0705 130/75 96.9 °F (36.1 °C)  78 20 91 %   02/20/21 2338 (!) 120/50 96.9 °F (36.1 °C) Temporal 72 18 95 %   02/20/21 1934 120/68 97.1 °F (36.2 °C) Temporal 86 20 93 %   02/20/21 1915      91 %     No intake or output data in the 24 hours ending 02/21/21 1443  General: Confused/disoriented  HEENT: Normocephalic atraumatic Neck: Supple with no JVD or carotid bruits. Chest:  clear to auscultation bilaterally  CVS: regular rate and rhythm  Abdominal: soft, nontender, normal bowel sounds  Extremities: no cyanosis or edema  Skin: warm and dry without rash      Labs:  BMP:   Recent Labs     02/19/21  1154 02/20/21 0227 02/21/21 0133   * 139 139   K 5.3* 4.3 3.5   CL 92* 97* 94*   CO2 26 26 26   BUN 77* 47* 22   CREATININE 7.2* 5.0* 3.3*   CALCIUM 9.7 8.8 9.3     CBC:   Recent Labs     02/19/21  1154 02/20/21 0227 02/21/21 0133   WBC 3.7* 3.2* 4.3*   HGB 11.9* 9.7* 10.8*   HCT 37.7 29.6* 34.5*   MCV 98.4 95.8 100.6*   * 105* 110*     LIVER PROFILE:   Recent Labs     02/19/21  1154 02/20/21 0227 02/21/21 0133   AST 54*  --  37*   ALT 55*  --  37*   LIPASE 1,492* 565*  --    BILITOT 0.4  --  0.5   ALKPHOS 353*  --  297*     PT/INR:   Recent Labs     02/19/21 1154 02/20/21 0226   PROTIME 15.4* 16.1*   INR 1.22* 1.29*     APTT: No results for input(s): APTT in the last 72 hours. BNP:  No results for input(s): BNP in the last 72 hours. Ionized Calcium:No results for input(s): IONCA in the last 72 hours. Magnesium:  Recent Labs     02/21/21 0133   MG 2.3     Phosphorus:No results for input(s): PHOS in the last 72 hours. HgbA1C:   Recent Labs     02/19/21  1154   LABA1C 6.7*     Hepatic:   Recent Labs     02/19/21 1154 02/21/21 0133   ALKPHOS 353* 297*   ALT 55* 37*   AST 54* 37*   PROT 7.6 7.1   BILITOT 0.4 0.5   LABALBU 4.4 4.4     Lactic Acid:   Recent Labs     02/19/21  1154   LACTA 0.8     Troponin:   Recent Labs     02/20/21  0227   CKTOTAL 160     ABGs: No results for input(s): PH, PCO2, PO2, HCO3, O2SAT in the last 72 hours. CRP:    Recent Labs     02/19/21  1154   CRP 7.45*     Sed Rate:    Recent Labs     02/19/21  1154   SEDRATE 30*         Cultures:   No results for input(s): CULTURE in the last 72 hours.   Recent Labs     02/19/21  1154 02/19/21  1201 BC No Growth to date. Any change in status will be called. --    BLOODCULT2  --  No Growth to date. Any change in status will be called. No results for input(s): CXSURG in the last 72 hours. Radiology reports as per the Radiologist  Radiology: Ct Head Wo Contrast    Result Date: 2/19/2021  Examination. CT HEAD WO CONTRAST 2/19/2021 12:55 PM History: Altered mental status. DLP: 1305 mGycm. The CT scan of the head is performed without intravenous contrast enhancement. The images are acquired in axial plane and subsequent reconstruction in coronal and sagittal planes. The comparison is made with the previous study dated 9/1/2020. There is no evidence of mass. No midline shift. There is no evidence of an intracranial hemorrhage or hematoma. Moderately dilated ventricles, basal cistern and the cortical sulci are similar to the previous study suggesting a chronic volume loss. A few scattered areas of chronic white matter ischemia in the centrum semiovale bilaterally are stable. The gray-white matter differentiation is maintained. A partially empty sella turcica is seen. The images reviewed in bone window show no acute bony abnormality. The visualized paranasal sinuses and mastoid air cells are clear. A curvilinear metallic density in the lateral margin of the left globe/orbit is similar to the previous study. No acute intracranial abnormality.  Signed by Dr Randa Zaraet on 2/19/2021 2:38 PM    Ct Chest W Contrast    Result Date: 2/19/2021 EXAM: CT CHEST W CONTRAST -- 2/19/2021 12:55 PM HISTORY: 56 years, Female, abnormal chest x-ray COMPARISON: 2/19/2021 DLP: 769.5 mGy cm. Automated exposure control was utilized to minimize patient radiation dose. TECHNIQUE: Enhanced  CT images of the chest obtained with multiplanar reformats. FINDINGS: AIRWAYS/PULMONARY PARENCHYMA: The central airways are midline and patent. Expiratory phase of imaging. Small right pleural effusion with compressive enhancing breast atelectasis involving the right middle and lower lobes. Right upper lobe with a pleural-based 1.7 x 2.0 cm opacity, which could represent round atelectasis, scarring or nodule. VASCULATURE: Thoracic aorta is normal in course and caliber. Mild calcified aortic atherosclerosis. Normal pulmonary artery caliber. No large central pulmonary artery filling defect on this non-CTA exam. Left subclavian vein with short segment severe stenosis on axial image 29. This is just lateral to the stent. There are left chest wall collaterals suggesting that this is a chronic finding. CARDIAC:  Borderline heart size. No pericardial effusion. Scattered coronary artery calcifications. MEDIASTINUM: There is no mediastinal or hilar lymphadenopathy by CT size criteria. Esophagus has normal coarse, caliber and wall thickness. EXTRATHORACIC: The visualized portions of the thyroid gland are unremarkable. No thoracic inlet or axillary adenopathy. Body wall edema. Left chest wall collaterals. INCLUDED UPPER ABDOMEN: The liver appears prominent low density, which could be due to fatty infiltration or phase of contrast. Cholecystectomy clips. No bile duct dilation. Small ascites. There is wedge-shaped low-density in the spleen, concerning for age-indeterminate splenic infarct. Visualized portion of the pancreas and adrenal glands appear within normal limits. Atrophic kidneys partially visualized. OSSEOUS: Mild degenerative changes in the spine. No acute or aggressive bony finding. 1. Right pleural effusion with compressive atelectasis of the right middle and lower lobes. 2. Right upper lobe with a pleural-based 1.7 x 2.0 cm opacity, which could represent round atelectasis, scarring or nodule. Recommend follow-up CT chest in 3 months per Fleischner criteria. 3. Left subclavian vein with a short segment severe stenosis, just lateral to the stent. Left chest wall collaterals suggest chronicity. 4. Borderline heart size with scattered coronary artery calcifications. 5. Wedge-shaped low-density in the spleen concerning for age-indeterminate splenic infarct. 6. Low-density of the liver which could be seen with fatty infiltration or exaggerated due to phase of contrast. Small volume ascites.  Signed by Dr Krishna Vee on 2/19/2021 2:42 PM    Ct Abdomen Pelvis W Iv Contrast Additional Contrast? None    Result Date: 2/19/2021 EXAM: CT ABDOMEN PELVIS W IV CONTRAST -- 2/19/2021 12:55 PM HISTORY: 56 years, Female, markedly elevated lipase, abdominal pain, diarrhea, ESRD on hemodialysis COMPARISON: No existing relevant imaging studies available DLP: 769.5 mGy cm. Automated exposure control was utilized to minimize patient radiation dose. TECHNIQUE: Enhanced CT images obtained of the abdomen and pelvis with multiplanar reformats. FINDINGS: LUNG BASES: Right pleural effusion with compressive atelectasis. Underlying inferior heart size with scattered coronary artery calcifications. LIVER: Diffuse low-density of the liver concerning for fatty liver. Liver appears mildly enlarged. Patent portal and hepatic veins. GALLBLADDER and BILARY: Cholecystectomy clips. No bile duct dilation. PANCREAS: Normal enhancement of the pancreas. There is ascites and mesenteric edema throughout the abdomen, which limited evaluation of the peripancreatic fat. SPLEEN: There is a wedge-shaped hypodensity in the spleen concerning for infarct. Splenic vein appears grossly patent. Normal caliber splenic artery. ADRENALS: No adrenal mass. URINARY: Atrophic kidneys. No hydronephrosis. Renal vascular calcifications. No convincing nephrolithiasis. Urinary bladder is underdistended, which limits evaluation. Grossly normal CT appearance of the uterus. Adnexa are not discretely identified. PERITONEUM: No ascites/free fluid. No pneumoperitoneum. BOWEL: Stomach and duodenum have normal course and caliber. No abnormally dilated loops of bowel or bowel wall thickening. Normal short appendix or appendiceal stump on axial images 61-57. RETROPERITONEUM:  Aorta normal in course and caliber with calcified atherosclerosis. Celiac and superior mesenteric arteries patent. Bilateral renal arteries diminutive. Inferior mesenteric artery opacified.  Heavily calcified iliac arteries, limited in evaluation on this non-CTA exam. Scattered retroperitoneal lymph nodes, not enlarged by CT size criteria, which may be reactive. ABDOMINAL WALL: Significant body wall edema. Prominent vessel in the partially visualized left distal upper arm, possibly fistula. OSSEOUS: Subacute to chronic right superior pubic ramus, right inferior pubic ramus and left inferior pubic rami fractures. 1. Ascites and mesenteric edema Limited evaluation of the peripancreatic fat. Normal enhancement of the pancreas. 2. Wedge-shaped hypodensity in the spleen concerning for age-indeterminate infarct. Splenic vein and artery appear patent and normal caliber. 3. Enlarged low-density liver concerning for fatty liver. 4. Right pleural effusion, ascites, body wall edema suggests fluid overload. 5. Heavily calcified atherosclerosis. 6. Subacute to chronic appearing pubic rami fractures. 7. See separately dictated CT chest of the same day. Signed by Dr Harvinder Powers on 2/19/2021 2:54 PM    Xr Chest Portable    Result Date: 2/19/2021  XR CHEST PORTABLE 2/19/2021 10:18 AM HISTORY: Shortness of breath COMPARISON: Chest x-ray dated 10/10/2020. FINDINGS: Mild cardiomegaly which is stable. Bilateral coarsened interstitial markings with large layering right pleural effusion. No pneumothorax. Vascular stent along the left brachiocephalic vein. No acute bony abnormality. 1. Interstitial edema with large layering right pleural effusion. Signed by Dr Mary Lamb on 2/19/2021 11:44 AM       Assessment   1. End-stage renal disease/maintenance dialysis. 2.  Type II diabetic nephropathy. 3.  Noncompliant dialysis patient. 4.  Anemia of chronic kidney disease. 5.  Hyperkalemia now resolved. 6.  Clinically volume overloaded  7. Right-sided pleural effusion/large. 8.  Metabolic encephalopathy  9. Pubic rami fractures/recent fall    Plan:  1. Negative CT of the head. 2.  Continue empiric antibiotics.           Fay Sparks MD  02/21/21  2:43 PM

## 2021-02-21 NOTE — PROGRESS NOTES
GI  - PROGRESS NOTE    Subjective:   Admit Date: 2/19/2021  PCP: RAMONA Salas    Patient being seen for:  Elevated lipase and diarrhea    24hr events/Interval History: Patient looks much improved today. She is sitting up in bed but still with some confusion. She denies abdominal pain. DIET CARB CONTROL;  Renal     Tolerated                  Pain:None  Nausea:None      Medications:  Scheduled Meds:   famotidine  10 mg Oral Daily    darbepoetin angie-polysorbate  60 mcg Subcutaneous Weekly    meropenem (MERREM) 500 mg in SWFI 10 mL IV syringe  500 mg Intravenous Q24H    vancomycin (VANCOCIN) intermittent dosing (placeholder)   Other RX Placeholder    ipratropium-albuterol  1 ampule Inhalation Q4H WA    aspirin EC  81 mg Oral Daily    sevelamer  800 mg Oral Daily    sodium chloride flush  10 mL Intravenous 2 times per day    insulin lispro  0-6 Units Subcutaneous TID WC    insulin lispro  0-3 Units Subcutaneous Nightly    heparin (porcine)  5,000 Units Subcutaneous 3 times per day    sodium chloride flush  10 mL Intravenous 2 times per day       Continuous Infusions:   dextrose 50 mL/hr (02/20/21 0054)       PRN Meds:.oxyCODONE-acetaminophen, morphine, albuterol, glucose, dextrose, glucagon (rDNA), dextrose, sodium chloride flush, potassium chloride **OR** potassium alternative oral replacement **OR** potassium chloride, promethazine **OR** ondansetron, magnesium hydroxide, sodium chloride flush      Labs:     Recent Labs     02/19/21  1154 02/20/21 0227 02/21/21  0133   WBC 3.7* 3.2* 4.3*   RBC 3.83* 3.09* 3.43*   HGB 11.9* 9.7* 10.8*   HCT 37.7 29.6* 34.5*   MCV 98.4 95.8 100.6*   MCH 31.1* 31.4* 31.5*   MCHC 31.6* 32.8* 31.3*   * 105* 110*     Recent Labs     02/19/21  1154 02/20/21 0227 02/21/21  0133   * 139 139   K 5.3* 4.3 3.5   ANIONGAP 17 16 19   CL 92* 97* 94*   CO2 26 26 26   BUN 77* 47* 22   CREATININE 7.2* 5.0* 3.3* GLUCOSE 67* 83 137*   CALCIUM 9.7 8.8 9.3     Recent Labs     02/21/21  0133   MG 2.3     Recent Labs     02/19/21  1154 02/21/21  0133   AST 54* 37*   ALT 55* 37*   BILITOT 0.4 0.5   ALKPHOS 353* 297*     HgBA1c:  No components found for: HGBA1C  FLP:    Lab Results   Component Value Date    TRIG 65 02/19/2021    HDL 61 06/09/2020    LDLCALC 25 06/09/2020    LDLDIRECT 120 07/28/2015     TSH:  No results found for: TSH  Troponin T: No results for input(s): TROPONINI in the last 72 hours. INR:   Recent Labs     02/19/21  1154 02/20/21 0226   INR 1.22* 1.29*       Recent Labs     02/19/21  1154 02/20/21 0227   LIPASE 1,492* 565*     -----------------------------------------------------------------  RAD:     Xr Pelvis (1-2 Views)    Result Date: 2/21/2021  EXAM: XR PELVIS (1-2 VIEWS) -- 2/21/2021 12:57 PM HISTORY: 56 years, Female, pelvic fractures COMPARISON: 10/10/2020 TECHNIQUE:  1 images. AP view of the pelvis FINDINGS:  Sacroiliac joints appear grossly symmetric. Pubic symphysis anatomic. Mildly displaced fracture at the right superior pubic ramus root. Mildly displaced fractures of the bilateral inferior pubic rami. There may be a subtle fracture at the anterior aspect of the left superior pubic ramus. Vascular calcifications. 1. Bilateral superior and inferior pubic rami fractures.  Signed by Dr Melissa Schwarz on 2/21/2021 2:26 PM    Us Abdomen Complete    Result Date: 2/21/2021 24HR INTAKE/OUTPUT:  No intake or output data in the 24 hours ending 02/21/21 1539  General appearance: alert and cooperative with exam  Lungs: clear to auscultation bilaterally  Heart: regular rate and rhythm  Abdomen: normal BS noted, mild LUQ tenderness noted, no guarding or rebound present  Extremities: extremities normal, atraumatic, no cyanosis or edema  Neurologic: No obvious focal neurologic deficits.       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 Stool studies have not been obtained as patient has not had diarrhea since admission. LFTs are better today and lipase not obtained although previously noted to be trending down. No obvious pancreatitis noted on CT scan. Unsure at to etiology for presentation. Lactic acid normal on admission which would make ischemic bowel unlikely. It is plausible she had a viral illness causing initial symptoms and then further decline due to missing dialysis. Would continue aggressive supportive care at this time. Would also obtain US abdomen when volume overload corrected and mesenteric edema improved which hopefully will allow for a better evaluation of the pancrease. She does have chronic elevation of Alkaline phosphatase. Hepatitis panel, AFP and ammonia normal.   No evidence of cirrhosis or portal hypertension on CT scan. Several liver serologies still pending. Will add autoimmune hepatic serologies to complete the workup. Further recommendations to follow stool and additional lab results      Plan:   1. Continue aggressive supportive care  2. Await serological studies. 3.  VALENCIA, AMA, ASMA and pANCA  4.  US when volume overload corrected and better visualization of the pancreas can be obtained.   5.   Will follow

## 2021-02-22 LAB
ALBUMIN SERPL-MCNC: 4.6 G/DL (ref 3.5–5.2)
ALP BLD-CCNC: 297 U/L (ref 35–104)
ALPHA-1 ANTITRYPSIN: 194 MG/DL (ref 90–200)
ALT SERPL-CCNC: 37 U/L (ref 5–33)
ANION GAP SERPL CALCULATED.3IONS-SCNC: 19 MMOL/L (ref 7–19)
AST SERPL-CCNC: 42 U/L (ref 5–32)
BASOPHILS ABSOLUTE: 0 K/UL (ref 0–0.2)
BASOPHILS RELATIVE PERCENT: 0.3 % (ref 0–1)
BILIRUB SERPL-MCNC: 0.6 MG/DL (ref 0.2–1.2)
BUN BLDV-MCNC: 33 MG/DL (ref 8–23)
CALCIUM SERPL-MCNC: 10 MG/DL (ref 8.8–10.2)
CHLORIDE BLD-SCNC: 96 MMOL/L (ref 98–111)
CO2: 25 MMOL/L (ref 22–29)
CREAT SERPL-MCNC: 4.3 MG/DL (ref 0.5–0.9)
EOSINOPHILS ABSOLUTE: 0.1 K/UL (ref 0–0.6)
EOSINOPHILS RELATIVE PERCENT: 3 % (ref 0–5)
GFR AFRICAN AMERICAN: 13
GFR NON-AFRICAN AMERICAN: 10
GLUCOSE BLD-MCNC: 122 MG/DL (ref 70–99)
GLUCOSE BLD-MCNC: 140 MG/DL (ref 70–99)
GLUCOSE BLD-MCNC: 88 MG/DL (ref 74–109)
HCT VFR BLD CALC: 35.9 % (ref 37–47)
HEMOGLOBIN: 10.9 G/DL (ref 12–16)
HEPATITIS BE ANTIBODY: NEGATIVE
HEPATITIS BE ANTIGEN: NEGATIVE
IMMATURE GRANULOCYTES #: 0 K/UL
LYMPHOCYTES ABSOLUTE: 0.5 K/UL (ref 1.1–4.5)
LYMPHOCYTES RELATIVE PERCENT: 14 % (ref 20–40)
MCH RBC QN AUTO: 30.8 PG (ref 27–31)
MCHC RBC AUTO-ENTMCNC: 30.4 G/DL (ref 33–37)
MCV RBC AUTO: 101.4 FL (ref 81–99)
MONOCYTES ABSOLUTE: 0.5 K/UL (ref 0–0.9)
MONOCYTES RELATIVE PERCENT: 14 % (ref 0–10)
NEUTROPHILS ABSOLUTE: 2.5 K/UL (ref 1.5–7.5)
NEUTROPHILS RELATIVE PERCENT: 68.4 % (ref 50–65)
PDW BLD-RTO: 17.9 % (ref 11.5–14.5)
PERFORMED ON: ABNORMAL
PERFORMED ON: ABNORMAL
PLATELET # BLD: 119 K/UL (ref 130–400)
PMV BLD AUTO: 10.2 FL (ref 9.4–12.3)
POTASSIUM REFLEX MAGNESIUM: 4.3 MMOL/L (ref 3.5–5)
POTASSIUM SERPL-SCNC: 4.3 MMOL/L (ref 3.5–5)
RBC # BLD: 3.54 M/UL (ref 4.2–5.4)
SODIUM BLD-SCNC: 140 MMOL/L (ref 136–145)
TOTAL PROTEIN: 7.3 G/DL (ref 6.6–8.7)
WBC # BLD: 3.6 K/UL (ref 4.8–10.8)

## 2021-02-22 PROCEDURE — 85025 COMPLETE CBC W/AUTO DIFF WBC: CPT

## 2021-02-22 PROCEDURE — 97161 PT EVAL LOW COMPLEX 20 MIN: CPT

## 2021-02-22 PROCEDURE — 94640 AIRWAY INHALATION TREATMENT: CPT

## 2021-02-22 PROCEDURE — 6360000002 HC RX W HCPCS: Performed by: INTERNAL MEDICINE

## 2021-02-22 PROCEDURE — 2580000003 HC RX 258: Performed by: INTERNAL MEDICINE

## 2021-02-22 PROCEDURE — 80053 COMPREHEN METABOLIC PANEL: CPT

## 2021-02-22 PROCEDURE — 97530 THERAPEUTIC ACTIVITIES: CPT

## 2021-02-22 PROCEDURE — 1210000000 HC MED SURG R&B

## 2021-02-22 PROCEDURE — 97165 OT EVAL LOW COMPLEX 30 MIN: CPT

## 2021-02-22 PROCEDURE — 6370000000 HC RX 637 (ALT 250 FOR IP): Performed by: INTERNAL MEDICINE

## 2021-02-22 PROCEDURE — 36415 COLL VENOUS BLD VENIPUNCTURE: CPT

## 2021-02-22 PROCEDURE — 82947 ASSAY GLUCOSE BLOOD QUANT: CPT

## 2021-02-22 RX ADMIN — IPRATROPIUM BROMIDE AND ALBUTEROL SULFATE 1 AMPULE: .5; 3 SOLUTION RESPIRATORY (INHALATION) at 10:39

## 2021-02-22 RX ADMIN — FAMOTIDINE 10 MG: 20 TABLET, FILM COATED ORAL at 15:55

## 2021-02-22 RX ADMIN — MEROPENEM 500 MG: 500 INJECTION, POWDER, FOR SOLUTION INTRAVENOUS at 22:32

## 2021-02-22 RX ADMIN — HEPARIN SODIUM 5000 UNITS: 5000 INJECTION INTRAVENOUS; SUBCUTANEOUS at 22:42

## 2021-02-22 RX ADMIN — Medication 10 ML: at 22:32

## 2021-02-22 RX ADMIN — IPRATROPIUM BROMIDE AND ALBUTEROL SULFATE 1 AMPULE: .5; 3 SOLUTION RESPIRATORY (INHALATION) at 14:13

## 2021-02-22 RX ADMIN — IPRATROPIUM BROMIDE AND ALBUTEROL SULFATE 1 AMPULE: .5; 3 SOLUTION RESPIRATORY (INHALATION) at 07:08

## 2021-02-22 RX ADMIN — IPRATROPIUM BROMIDE AND ALBUTEROL SULFATE 1 AMPULE: .5; 3 SOLUTION RESPIRATORY (INHALATION) at 18:05

## 2021-02-22 RX ADMIN — Medication 81 MG: at 15:55

## 2021-02-22 RX ADMIN — INSULIN LISPRO 1 UNITS: 100 INJECTION, SOLUTION INTRAVENOUS; SUBCUTANEOUS at 22:42

## 2021-02-22 NOTE — PROGRESS NOTES
Physical Therapy    Facility/Department: [facility] 3 SAMEER/VAS/MED  Initial Assessment    NAME: Kirk Arora  : 1960  MRN: [id number]    Date of Service: 2021    Discharge Recommendations:  Continue to assess pending progress, Patient would benefit from continued therapy after discharge   PT Equipment Recommendations  Other: ASSESSING    Assessment   Body structures, Functions, Activity limitations: Decreased functional mobility ; Decreased endurance;Decreased strength;Decreased safe awareness;Decreased cognition  Assessment: pt WOULD BENEFIT FROM SKILLED PT SERVICES IN THIS SETTING TO ADDRESS HER STRENGTH AND MOBILITY DEFICITS. Prognosis: Good  Decision Making: Low Complexity  PT Education: Transfer Training;Functional Mobility Training;General Safety  REQUIRES PT FOLLOW UP: Yes  Activity Tolerance  Activity Tolerance: Patient Tolerated treatment well       Patient Diagnosis(es): The primary encounter diagnosis was ESRD on hemodialysis ([facility] [address] Diagnoses of Diarrhea, unspecified type, Other fatigue, General weakness, Elevated lipase, Hypoglycemia, Tobacco abuse, Chronic respiratory failure with hypoxia ([address] and Pleural effusion were also pertinent to this visit. has a past medical history of Blind right eye, Blindness of one eye, CHF (congestive heart failure) ([address] CKD (chronic kidney disease), stage IV ([address] Diabetes ([address] Diabetes mellitus with ophthalmic manifestations, Glaucoma, Hemodialysis patient ([address] Hypertension, Obesity, Renal osteodystrophy, Smoker, and Type II or unspecified type diabetes mellitus with renal manifestations, uncontrolled(250.42). has a past surgical history that includes Cholecystectomy;  section; Cataract removal; eye surgery; Tympanostomy tube placement (Bilateral); Carpal tunnel release; Dialysis fistula creation (Left, 4/23/15 [facility]); vascular surgery (Left, 5/11/15 [facility]); vascular surgery (Left, 5/28/15 [facility]); vascular surgery (Left, 6/15/15 [facility]); vascular surgery (05/15/2017); vascular surgery (2018); Colonoscopy (N/A, 3/9/2018); and vascular surgery (2019). Restrictions  Restrictions/Precautions  Restrictions/Precautions: (TTWB on RLE and progress as tolerated)  Lower Extremity Weight Bearing Restrictions  Right Lower Extremity Weight Bearing: Weight Bearing As Tolerated  Left Lower Extremity Weight Bearing: Weight Bearing As Tolerated  Vision/Hearing        Subjective  General  Patient assessed for rehabilitation services?: Yes  Diagnosis: acute diarrhea, AMS, fall iwth bilat pelvic fx's  Subjective  Subjective: Pt answers questions intermittently but closes eyes often and moans but shakes head no to pain. appears anxious but willing to work with PT  Pain Screening  Patient Currently in Pain: Denies  Vital Signs  Patient Currently in Pain: Denies       Orientation  Orientation  Overall Orientation Status: (did not answer orientation questions)  Social/Functional History  Social/Functional History  Lives With: Spouse  ADL Assistance: Independent  Ambulation Assistance: Independent  Transfer Assistance: Independent  Additional Comments: Information per chart review.   Pt. unable to answer and give PLOF  Cognition        Objective          PROM RLE (degrees)  RLE PROM: WFL  PROM LLE (degrees)  LLE PROM: WFL  Strength Other  Other: able to stand and bear weight thru le's        Bed mobility  Supine to Sit: Moderate assistance;2 Person assistance;Maximum assistance  Sit to Supine: Maximum assistance;2 Person assistance  Transfers  Sit to Stand: Minimal Assistance;2 Person Assistance Stand to sit: Minimal Assistance;2 Person Assistance  Comment: did not attempt to TF to recliner due to confusion and asterixis type movments in ue's/le's. Pt stood x 2 for change of bedpad and then took 2 very short steps toward hob and then returned to sitting and then supine.         Balance  Comments: sitting eob, pT was able to maintain sitting balance holding to one bedrail and with close supervision        Plan   Plan  Times per week: 5-7  Plan weeks: 2  Current Treatment Recommendations: Strengthening, Functional Mobility Training, Transfer Training, Pain Management, Positioning, Gait Training, Patient/Caregiver Education & Training, Safety Education & Training  Plan Comment: WBAT BILAT LE'S, DOUBLE, PROGRESS TF'S AND AMBULATION AS TOLERATED  Safety Devices  Type of devices: Call light within reach, Left in bed, Bed alarm in place            Goals  Short term goals  Time Frame for Short term goals: 2 wks  Short term goal 1: SUP<>SIT, SBA  Short term goal 2: SIT<>STAND, CGA  Short term goal 3: STAND STEP TF'S WITH RW AND CGA  Short term goal 4: AMB 50 FT WITH RW, CGA       Therapy Time   Individual Concurrent Group Co-treatment   Time In           Time Out           Minutes                   Kiesha Terrazas PT    Electronically signed by Kiesha Terrazas PT on 2/22/2021 at 2:11 PM

## 2021-02-22 NOTE — PROGRESS NOTES
Nephrology ([facility]) Progress Note      Patient:  Bianca Bernal  YOB: 1960  Date of Service: 2/22/2021  MRN: [id number]   Acct: [de-identified]   Primary Care Physician: RAMONA De Leon  Advance Directive: Full Code  Admit Date: 2/19/2021       Hospital Day: 3  Referring Provider: Trace Norton MD    Patient independently seen and examined, Chart, Consults, Notes, Operative notes, Labs, Cardiology, and Radiology studies reviewed as available. Chief complaint: Increasing shortness of breath/end-stage renal disease    Subjective:  Bianca Bernal is a 56 y.o. female  whom we were consulted for end-stage renal disease. Patient goes to [facility] dialysis clinic on Tuesday Thursday Saturday however she has skipped treatment on Thursday afternoon. She presented to emergency room on Friday morning with increasing weakness, shortness of breath. She also reports some diarrhea. Incidental finding of the labs shows potassium was 6.5 mmol. She was also clinically volume overload. She has received emergent dialysis treatment on Friday. She has received routine hemodialysis treatment on 2/20. This afternoon she is confused, agitated, denies any shortness of breath.     Allergies:  Bupropion, Gabapentin, Sulfa antibiotics, Varenicline, Wellbutrin [bupropion hcl], Azithromycin, Levaquin [levofloxacin], and Penicillins    Medicines:  Current Facility-Administered Medications   Medication Dose Route Frequency Provider Last Rate Last Admin    famotidine (PEPCID) tablet 10 mg  10 mg Oral Daily Trace Norton MD        darbepoetin angie-polysorbate (ARANESP) injection 60 mcg  60 mcg Subcutaneous Weekly Marylou Pro MD   60 mcg at 02/20/21 1247    meropenem (MERREM) 500 mg in sterile water 10 mL IV syringe  500 mg Intravenous Q24H Trace Norton MD   500 mg at 02/21/21 1344  vancomycin (VANCOCIN) intermittent dosing (placeholder)   Other RX Placeholder Vicente Pope MD        oxyCODONE-acetaminophen (PERCOCET) 5-325 MG per tablet 1 tablet  1 tablet Oral Q6H PRN Vicente Pope MD   1 tablet at 02/20/21 1648    morphine injection 1 mg  1 mg Intravenous Q4H PRN Vicente Pope MD   1 mg at 02/21/21 2209    ipratropium-albuterol (Yary Halsted) nebulizer solution 1 ampule  1 ampule Inhalation Q4H WA Vicente Pope MD   1 ampule at 02/22/21 0708    aspirin EC tablet 81 mg  81 mg Oral Daily Vicente Pope MD        albuterol (PROVENTIL) nebulizer solution 2.5 mg  2.5 mg Nebulization Q6H PRN Vicente Pope MD        sevelamer (RENVELA) tablet 800 mg  800 mg Oral Daily Vicente Pope MD        glucose (GLUTOSE) 40 % oral gel 15 g  15 g Oral PRN Vicente Pope MD        dextrose 50 % IV solution  12.5 g Intravenous PRN Vicente Pope MD   12.5 g at 02/19/21 2110    glucagon (rDNA) injection 1 mg  1 mg Intramuscular PRN Vicente Pope MD        dextrose 5 % solution  100 mL/hr Intravenous PRN Vicente Pope MD 50 mL/hr at 02/20/21 0054 50 mL/hr at 02/20/21 0054    sodium chloride flush 0.9 % injection 10 mL  10 mL Intravenous 2 times per day Vicente Pope MD   10 mL at 02/20/21 0246    sodium chloride flush 0.9 % injection 10 mL  10 mL Intravenous PRN Vicente Pope MD        potassium chloride (KLOR-CON M) extended release tablet 40 mEq  40 mEq Oral PRN Vicente Pope MD        Or    potassium bicarb-citric acid (EFFER-K) effervescent tablet 40 mEq  40 mEq Oral PRN Vicente Pope MD        Or    potassium chloride 10 mEq/100 mL IVPB (Peripheral Line)  10 mEq Intravenous PRN Vicente Pope MD        promethazine (PHENERGAN) tablet 12.5 mg  12.5 mg Oral Q6H PRN Vicente Pope MD        Or    ondansetron [facility]) injection 4 mg  4 mg Intravenous Q6H PRN Vicente Pope MD   4 mg at 02/21/21 4755  magnesium hydroxide (MILK OF MAGNESIA) 400 MG/5ML suspension 30 mL  30 mL Oral Daily PRN Alfa Dai MD        insulin lispro (HUMALOG) injection vial 0-6 Units  0-6 Units Subcutaneous TID WC Alfa Dai MD        insulin lispro (HUMALOG) injection vial 0-3 Units  0-3 Units Subcutaneous Nightly Alfa Dai MD        heparin (porcine) injection 5,000 Units  5,000 Units Subcutaneous 3 times per day Alfa Dai MD   Stopped at 02/20/21 0057    sodium chloride flush 0.9 % injection 10 mL  10 mL Intravenous 2 times per day Alfa Dai MD   10 mL at 02/21/21 2207    sodium chloride flush 0.9 % injection 10 mL  10 mL Intravenous PRN Alfa Dai MD           Past Medical History:  Past Medical History:   Diagnosis Date    Blind right eye     partial vision loss in the L eye too, due to DM    Blindness of one eye     Right     [facility]est[facility] failure) [facility] [address]     CKD (chronic kidney disease), stage IV (HCC)     secondary to diabetic nephropathy    Diabetes ([facility] [address]     Diabetes mellitus with ophthalmic manifestations     dx'd in 2000 but likely ongoing before that, was dx'd when she went blind in the R eye    Glaucoma     Hemodialysis patient ([facility] [address]     dialysis tues, thurs, sat. [facility]    Hypertension     2000, dx'd at same time as the DM    Obesity     Renal osteodystrophy     Smoker     Type II or unspecified type diabetes mellitus with renal manifestations, uncontrolled(250.42)        Past Surgical History:  Past Surgical History:   Procedure Laterality Date    CARPAL TUNNEL RELEASE      CATARACT REMOVAL     [address] [place], and 1979    CHOLECYSTECTOMY      COLONOSCOPY N/A 3/9/2018    Dr ReevesRkdbdn-Zwowxfkqfydrlx-Hbmpbhyjpcvdz AP (-) dysplasia x 1, tubular AP x  (-) dysplasia x 1, HP x 4--1 yr recall    DIALYSIS FISTULA CREATION Left 4/23/15 [facility]    Left proximal ulnar artery to cephalic vein AVF creation  EYE SURGERY      laser, several times     TYMPANOSTOMY TUBE PLACEMENT Bilateral     VASCULAR SURGERY Left 5/11/15 [facility]    US-guided cannulation left distal upper arm cephalic vein with 4-[place] glide sheath; LUE AV f'grams with venography SVC; left cephalic vein BAM with [id number] julieth balloon; completion venogram E    VASCULAR SURGERY Left 5/28/15 [facility]    Percutaneous cannulation left distal radial artery with 4-[place] glide sheath; LUE AV f'grams with venography SVC; left cephalic vein balloon a'plasty with [id number] julieth balloon and [id number] julieth balloon; proximal and mid upper arm cephalic vein BAM with [id number] julieth balloon; completion venogram [facility]    VASCULAR SURGERY Left 6/15/15 [facility]    US-guided cannulation left cephalic vein with 4-[place] and later 7-[place]an sheath; LUE AV f'grams including venography SVC; left cephalic vein stenosis balloon a'plasty with [id number] cutting balloon; completion f'gram and venogram [facility]    VASCULAR SURGERY  05/15/2017    [facility]. Left upper fistulograms/venograms.  VASCULAR SURGERY  02/14/2018    [facility]. Left upper fistulograms, left subclavian BA 12x40 atlas, left cephalic arch BA 70X97 conquest.    VASCULAR SURGERY  02/13/2019    [facility]. Left upper fistulograms,BA left SCV 8x40 conquest,stent left SCV, viabahn 13x50,left SCV stent BA 12x40 conquest.       Family History  Family History   Problem Relation Age of Onset    Heart Disease Mother     Heart Attack Mother     Glaucoma Sister     Diabetes Sister     Diabetes Brother     Kidney Disease Brother     Blindness Brother     Stroke Maternal Grandmother     Stroke Maternal Grandfather     Colon Cancer Neg Hx     Colon Polyps Neg Hx     Liver Cancer Neg Hx     Liver Disease Neg Hx     Esophageal Cancer Neg Hx     Rectal Cancer Neg Hx     Stomach Cancer Neg Hx        Social History  Social History     Socioeconomic History    Marital status:      Spouse name: Not on file  Number of children: Not on file    Years of education: Not on file    Highest education level: Not on file   Occupational History    Not on file   Social Needs    Financial resource strain: Not on file    Food insecurity     Worry: Not on file     Inability: Not on file    Transportation needs     Medical: Not on file     Non-medical: Not on file   Tobacco Use    Smoking status: Current Every Day Smoker     Packs/day: 1.00     Years: 40.00     Pack years: 40.00    Smokeless tobacco: Never Used   Substance and Sexual Activity    Alcohol use: No     Alcohol/week: 0.0 standard drinks    Drug use: Yes     Types: Marijuana    Sexual activity: Not Currently     Partners: Male   Lifestyle    Physical activity     Days per week: Not on file     Minutes per session: Not on file    Stress: Not on file   Relationships    Social connections     Talks on phone: Not on file     Gets together: Not on file     Attends Quaker service: Not on file     Active member of club or organization: Not on file     Attends meetings of clubs or organizations: Not on file     Relationship status: Not on file    Intimate partner violence     Fear of current or ex partner: Not on file     Emotionally abused: Not on file     Physically abused: Not on file     Forced sexual activity: Not on file   Other Topics Concern    Not on file   Social History Narrative    Not on file         Review of Systems:  Unable to obtain as she is confused and disoriented.       Objective:  Patient Vitals for the past 24 hrs:   BP Temp Temp src Pulse Resp SpO2 Weight   02/22/21 0708     17 96 %    02/22/21 0645 128/74 98.3 °F (36.8 °C)  77 16 96 %    02/22/21 0013 130/85 97.2 °F (36.2 °C) Temporal 85 18 95 %    02/21/21 2211    82      02/21/21 1854 136/72 96.7 °F (35.9 °C) Temporal 82 18 92 % 144 lb 2 oz (65.4 kg)   02/21/21 1159 135/76 97.6 °F (36.4 °C)  74 20 91 %  No intake or output data in the 24 hours ending 02/22/21 1017  General: Confused/disoriented  HEENT: Normocephalic atraumatic  Neck: Supple with no JVD or carotid bruits. Chest:  clear to auscultation bilaterally  CVS: regular rate and rhythm  Abdominal: soft, nontender, normal bowel sounds  Extremities: no cyanosis or edema  Skin: warm and dry without rash      Labs:  BMP:   Recent Labs     02/20/21 0227 02/21/21 0133 02/22/21 0219    139 140   K 4.3 3.5 4.3  4.3   CL 97* 94* 96*   CO2 26 26 25   BUN 47* 22 33*   CREATININE 5.0* 3.3* 4.3*   CALCIUM 8.8 9.3 10.0     CBC:   Recent Labs     02/20/21 0227 02/21/21 0133 02/22/21 0219   WBC 3.2* 4.3* 3.6*   HGB 9.7* 10.8* 10.9*   HCT 29.6* 34.5* 35.9*   MCV 95.8 100.6* 101.4*   * 110* 119*     LIVER PROFILE:   Recent Labs     02/19/21  1154 02/20/21 0227 02/21/21 0133 02/22/21 0219   AST 54*  --  37* 42*   ALT 55*  --  37* 37*   LIPASE 1,492* 565*  --   --    BILITOT 0.4  --  0.5 0.6   ALKPHOS 353*  --  297* 297*     PT/INR:   Recent Labs     02/19/21  1154 02/20/21 0226   PROTIME 15.4* 16.1*   INR 1.22* 1.29*     APTT: No results for input(s): APTT in the last 72 hours. BNP:  No results for input(s): BNP in the last 72 hours. Ionized Calcium:No results for input(s): IONCA in the last 72 hours. Magnesium:  Recent Labs     02/21/21 0133   MG 2.3     Phosphorus:No results for input(s): PHOS in the last 72 hours. HgbA1C:   Recent Labs     02/19/21  1154   LABA1C 6.7*     Hepatic:   Recent Labs     02/19/21  1154 02/21/21 0133 02/22/21  0219   ALKPHOS 353* 297* 297*   ALT 55* 37* 37*   AST 54* 37* 42*   PROT 7.6 7.1 7.3   BILITOT 0.4 0.5 0.6   LABALBU 4.4 4.4 4.6     Lactic Acid:   Recent Labs     02/19/21  1154   LACTA 0.8     Troponin:   Recent Labs     02/20/21  0227   CKTOTAL 160     ABGs: No results for input(s): PH, PCO2, PO2, HCO3, O2SAT in the last 72 hours.   CRP:    Recent Labs     02/19/21  1154   CRP 7.45*     Sed Rate: Recent Labs     02/19/21  1154   SEDRATE 30*         Cultures:   No results for input(s): CULTURE in the last 72 hours. Recent Labs     02/19/21  1154 02/19/21  1201   BC No Growth to date. Any change in status will be called. --    BLOODCULT2  --  No Growth to date. Any change in status will be called. No results for input(s): CXSURG in the last 72 hours. Radiology reports as per the Radiologist  Radiology: Ct Head Wo Contrast    Result Date: 2/19/2021  Examination. CT HEAD WO CONTRAST 2/19/2021 12:55 PM History: Altered mental status. DLP: 1305 mGycm. The CT scan of the head is performed without intravenous contrast enhancement. The images are acquired in axial plane and subsequent reconstruction in coronal and sagittal planes. The comparison is made with the previous study dated 9/1/2020. There is no evidence of mass. No midline shift. There is no evidence of an intracranial hemorrhage or hematoma. Moderately dilated ventricles, basal cistern and the cortical sulci are similar to the previous study suggesting a chronic volume loss. A few scattered areas of chronic white matter ischemia in the centrum semiovale bilaterally are stable. The gray-white matter differentiation is maintained. A partially empty sella turcica is seen. The images reviewed in bone window show no acute bony abnormality. The visualized paranasal sinuses and mastoid air cells are clear. A curvilinear metallic density in the lateral margin of the left globe/orbit is similar to the previous study. No acute intracranial abnormality.  Signed by Dr Imelda De Souza on 2/19/2021 2:38 PM    Ct Chest W Contrast    Result Date: 2/19/2021 EXAM: CT CHEST W CONTRAST -- 2/19/2021 12:55 PM HISTORY: 56 years, Female, abnormal chest x-ray COMPARISON: 2/19/2021 DLP: 769.5 mGy cm. Automated exposure control was utilized to minimize patient radiation dose. TECHNIQUE: Enhanced  CT images of the chest obtained with multiplanar reformats. FINDINGS: AIRWAYS/PULMONARY PARENCHYMA: The central airways are midline and patent. Expiratory phase of imaging. Small right pleural effusion with compressive enhancing breast atelectasis involving the right middle and lower lobes. Right upper lobe with a pleural-based 1.7 x 2.0 cm opacity, which could represent round atelectasis, scarring or nodule. VASCULATURE: Thoracic aorta is normal in course and caliber. Mild calcified aortic atherosclerosis. Normal pulmonary artery caliber. No large central pulmonary artery filling defect on this non-CTA exam. Left subclavian vein with short segment severe stenosis on axial image 29. This is just lateral to the stent. There are left chest wall collaterals suggesting that this is a chronic finding. CARDIAC:  Borderline heart size. No pericardial effusion. Scattered coronary artery calcifications. MEDIASTINUM: There is no mediastinal or hilar lymphadenopathy by CT size criteria. Esophagus has normal coarse, caliber and wall thickness. EXTRATHORACIC: The visualized portions of the thyroid gland are unremarkable. No thoracic inlet or axillary adenopathy. Body wall edema. Left chest wall collaterals. INCLUDED UPPER ABDOMEN: The liver appears prominent low density, which could be due to fatty infiltration or phase of contrast. Cholecystectomy clips. No bile duct dilation. Small ascites. There is wedge-shaped low-density in the spleen, concerning for age-indeterminate splenic infarct. Visualized portion of the pancreas and adrenal glands appear within normal limits. Atrophic kidneys partially visualized. OSSEOUS: Mild degenerative changes in the spine. No acute or aggressive bony finding. 1. Right pleural effusion with compressive atelectasis of the right middle and lower lobes. 2. Right upper lobe with a pleural-based 1.7 x 2.0 cm opacity, which could represent round atelectasis, scarring or nodule. Recommend follow-up CT chest in 3 months per Fleischner criteria. 3. Left subclavian vein with a short segment severe stenosis, just lateral to the stent. Left chest wall collaterals suggest chronicity. 4. Borderline heart size with scattered coronary artery calcifications. 5. Wedge-shaped low-density in the spleen concerning for age-indeterminate splenic infarct. 6. Low-density of the liver which could be seen with fatty infiltration or exaggerated due to phase of contrast. Small volume ascites.  Signed by Dr Diego Salcido on 2/19/2021 2:42 PM    Ct Abdomen Pelvis W Iv Contrast Additional Contrast? None    Result Date: 2/19/2021 EXAM: CT ABDOMEN PELVIS W IV CONTRAST -- 2/19/2021 12:55 PM HISTORY: 56 years, Female, markedly elevated lipase, abdominal pain, diarrhea, ESRD on hemodialysis COMPARISON: No existing relevant imaging studies available DLP: 769.5 mGy cm. Automated exposure control was utilized to minimize patient radiation dose. TECHNIQUE: Enhanced CT images obtained of the abdomen and pelvis with multiplanar reformats. FINDINGS: LUNG BASES: Right pleural effusion with compressive atelectasis. Underlying inferior heart size with scattered coronary artery calcifications. LIVER: Diffuse low-density of the liver concerning for fatty liver. Liver appears mildly enlarged. Patent portal and hepatic veins. GALLBLADDER and BILARY: Cholecystectomy clips. No bile duct dilation. PANCREAS: Normal enhancement of the pancreas. There is ascites and mesenteric edema throughout the abdomen, which limited evaluation of the peripancreatic fat. SPLEEN: There is a wedge-shaped hypodensity in the spleen concerning for infarct. Splenic vein appears grossly patent. Normal caliber splenic artery. ADRENALS: No adrenal mass. URINARY: Atrophic kidneys. No hydronephrosis. Renal vascular calcifications. No convincing nephrolithiasis. Urinary bladder is underdistended, which limits evaluation. Grossly normal CT appearance of the uterus. Adnexa are not discretely identified. PERITONEUM: No ascites/free fluid. No pneumoperitoneum. BOWEL: Stomach and duodenum have normal course and caliber. No abnormally dilated loops of bowel or bowel wall thickening. Normal short appendix or appendiceal stump on axial images 61-57. RETROPERITONEUM:  Aorta normal in course and caliber with calcified atherosclerosis. Celiac and superior mesenteric arteries patent. Bilateral renal arteries diminutive. Inferior mesenteric artery opacified.  Heavily calcified iliac arteries, limited in evaluation on this non-CTA exam. Scattered retroperitoneal lymph nodes, not enlarged by CT size criteria, which may be reactive. ABDOMINAL WALL: Significant body wall edema. Prominent vessel in the partially visualized left distal upper arm, possibly fistula. OSSEOUS: Subacute to chronic right superior pubic ramus, right inferior pubic ramus and left inferior pubic rami fractures. 1. Ascites and mesenteric edema Limited evaluation of the peripancreatic fat. Normal enhancement of the pancreas. 2. Wedge-shaped hypodensity in the spleen concerning for age-indeterminate infarct. Splenic vein and artery appear patent and normal caliber. 3. Enlarged low-density liver concerning for fatty liver. 4. Right pleural effusion, ascites, body wall edema suggests fluid overload. 5. Heavily calcified atherosclerosis. 6. Subacute to chronic appearing pubic rami fractures. 7. See separately dictated CT chest of the same day. Signed by Dr Mandi Gomez on 2/19/2021 2:54 PM    Xr Chest Portable    Result Date: 2/19/2021  XR CHEST PORTABLE 2/19/2021 10:18 AM HISTORY: Shortness of breath COMPARISON: Chest x-ray dated 10/10/2020. FINDINGS: Mild cardiomegaly which is stable. Bilateral coarsened interstitial markings with large layering right pleural effusion. No pneumothorax. Vascular stent along the left brachiocephalic vein. No acute bony abnormality. 1. Interstitial edema with large layering right pleural effusion. Signed by Dr Td Garner on 2/19/2021 11:44 AM       Assessment   1. End-stage renal disease/maintenance dialysis. 2.  Type II diabetic nephropathy. 3.  Noncompliant dialysis patient. 4.  Anemia of chronic kidney disease. 5.  Hyperkalemia now resolved. 6.  Clinically volume overloaded  7. Right-sided pleural effusion/large. 8.  Metabolic encephalopathy  9. Pubic rami fractures/recent fall    Plan:  1. Negative CT of the head. 2.  Continue empiric antibiotics.   3.  Dialysis is due again on 2/23          Billy Lechuga MD  02/22/21  10:17 AM

## 2021-02-22 NOTE — CONSULTS
Orthopaedic Surgery  Inpatient Consultation    Luis Turpin ([id number])  2/22/2021    Requesting Physician: DR ORR [facility]  Consulting Physician: DR Turner Aden  2/19/2021 10:43 AM    CHIEF COMPLAINT:  right PELVIC FX    History Obtained From:  CHART/ NO FAMILY PRESENT AND PT CONFUSED    HISTORY OF PRESENT ILLNESS:       Luis Turpin is a 56 y.o. female who presents to the emergency department with lethargy and weakness. The patient had been okay up until this morning where she rolled over and said she had pain in her back. She lives with her  at home. She is a full code. Patient missed dialysis on Thursday because she was embarrassed that she been having a lot of diarrhea. She has not had any diarrhea in the ER thus far. Patient chronically wears oxygen. She is short of breath mildly. She has no known Covid exposure she is somewhat confused and weak appearing on arrival.  History is very limited much obtained from the .     Patient usually bathes dresses and does all her activities of daily living alone. Today she had a major decline. There was no focal weakness it was more general malaise and or pain and or illness. She has not had dialysis since Tuesday.     The history is provided by the patient. The history is limited by the condition of the patient.      PT IS CONFUSED THIS AM, UNABLE TO OBTAIN ANY ORTHO HX.     Past Medical History:        Diagnosis Date    Blind right eye     partial vision loss in the L eye too, due to DM    Blindness of one eye     Right     CHF (congestive heart failure) ([facility])     CKD (chronic kidney disease), stage IV ([facility])     secondary to diabetic nephropathy    Diabetes ([facility]     Diabetes mellitus with ophthalmic manifestations     dx'd in 2000 but likely ongoing before that, was dx'd when she went blind in the R eye    Glaucoma     Hemodialysis patient ([facility]     dialysis tues, thurs, sat. [facility] [address], [facility]    Hypertension 2000, dx'd at same time as the DM    Obesity     Renal osteodystrophy     Smoker     Type II or unspecified type diabetes mellitus with renal manifestations, uncontrolled(250.42)        Past Surgical History:        Procedure Laterality Date    CARPAL TUNNEL RELEASE      CATARACT REMOVAL       SECTION      , , and     CHOLECYSTECTOMY      COLONOSCOPY N/A 3/9/2018    Dr ReevesGxgwto-Esqxesfdshjxpk-Grvzakggrmloo AP (-) dysplasia x 1, tubular AP x  (-) dysplasia x 1, HP x 4--1 yr recall    DIALYSIS FISTULA CREATION Left 4/23/15 S    Left proximal ulnar artery to cephalic vein AVF creation    EYE SURGERY      laser, several times     TYMPANOSTOMY TUBE PLACEMENT Bilateral     VASCULAR SURGERY Left 5/11/15 [facility]    US-guided cannulation left distal upper arm cephalic vein with 4-Bruneian glide sheath; LUE AV f'grams with venography SVC; left cephalic vein BAM with [id number] julieth balloon; completion venogram E    VASCULAR SURGERY Left 5/28/15 [facility]    Percutaneous cannulation left distal radial artery with 4-Bruneian glide sheath; LUE AV f'grams with venography SVC; left cephalic vein balloon a'plasty with [id number] julieth balloon and [id number] julieth balloon; proximal and mid upper arm cephalic vein BAM with [id number] julieth balloon; completion venogram E    VASCULAR SURGERY Left 6/15/15 [facility]    US-guided cannulation left cephalic vein with 4-Bruneian and later 7-Bruneian sheath; LUE AV f'grams including venography SVC; left cephalic vein stenosis balloon a'plasty with [id number] cutting balloon; completion f'gram and venogram [facility]    VASCULAR SURGERY  05/15/2017    [facility]. Left upper fistulograms/venograms.  VASCULAR SURGERY  2018    [facility]. Left upper fistulograms, left subclavian BA 12x40 atlas, left cephalic arch BA [id number] conquest.    VASCULAR SURGERY  2019    S. Left upper fistulograms,BA left SCV 8x40 conquest,stent left SCV, viabahn 13x50,left SCV stent BA 12x40 conquest. Current Medications:   Current Facility-Administered Medications: famotidine (PEPCID) tablet 10 mg, 10 mg, Oral, Daily  darbepoetin angie-polysorbate (ARANESP) injection 60 mcg, 60 mcg, Subcutaneous, Weekly  meropenem (MERREM) 500 mg in sterile water 10 mL IV syringe, 500 mg, Intravenous, Q24H  vancomycin (VANCOCIN) intermittent dosing (placeholder), , Other, RX Placeholder  oxyCODONE-acetaminophen (PERCOCET) 5-325 MG per tablet 1 tablet, 1 tablet, Oral, Q6H PRN  morphine injection 1 mg, 1 mg, Intravenous, Q4H PRN  ipratropium-albuterol (DUONEB) nebulizer solution 1 ampule, 1 ampule, Inhalation, Q4H WA  aspirin EC tablet 81 mg, 81 mg, Oral, Daily  albuterol (PROVENTIL) nebulizer solution 2.5 mg, 2.5 mg, Nebulization, Q6H PRN  sevelamer (RENVELA) tablet 800 mg, 800 mg, Oral, Daily  glucose (GLUTOSE) 40 % oral gel 15 g, 15 g, Oral, PRN  dextrose 50 % IV solution, 12.5 g, Intravenous, PRN  glucagon (rDNA) injection 1 mg, 1 mg, Intramuscular, PRN  dextrose 5 % solution, 100 mL/hr, Intravenous, PRN  sodium chloride flush 0.9 % injection 10 mL, 10 mL, Intravenous, 2 times per day  sodium chloride flush 0.9 % injection 10 mL, 10 mL, Intravenous, PRN  potassium chloride (KLOR-CON M) extended release tablet 40 mEq, 40 mEq, Oral, PRN **OR** potassium bicarb-citric acid (EFFER-K) effervescent tablet 40 mEq, 40 mEq, Oral, PRN **OR** potassium chloride 10 mEq/100 mL IVPB (Peripheral Line), 10 mEq, Intravenous, PRN  promethazine (PHENERGAN) tablet 12.5 mg, 12.5 mg, Oral, Q6H PRN **OR** ondansetron (ZOFRAN) injection 4 mg, 4 mg, Intravenous, Q6H PRN  magnesium hydroxide (MILK OF MAGNESIA) 400 MG/5ML suspension 30 mL, 30 mL, Oral, Daily PRN  insulin lispro (HUMALOG) injection vial 0-6 Units, 0-6 Units, Subcutaneous, TID WC  insulin lispro (HUMALOG) injection vial 0-3 Units, 0-3 Units, Subcutaneous, Nightly  heparin (porcine) injection 5,000 Units, 5,000 Units, Subcutaneous, 3 times per day sodium chloride flush 0.9 % injection 10 mL, 10 mL, Intravenous, 2 times per day  sodium chloride flush 0.9 % injection 10 mL, 10 mL, Intravenous, PRN  Prior to Admission medications    Medication Sig Start Date End Date Taking? Authorizing Provider   pregabalin (LYRICA) 50 MG capsule Take 1 capsule by mouth 2 times daily for 30 days.  1/12/21 2/19/21 Yes RAMONA Davis   losartan (COZAAR) 50 MG tablet TAKE 1 TABLET BY MOUTH TWICE DAILY 12/24/20  Yes RAMONA Davis   albuterol sulfate HFA (PROVENTIL HFA) 108 (90 Base) MCG/ACT inhaler Inhale 2 puffs into the lungs every 6 hours as needed for Wheezing 10/16/20  Yes RAMONA Wheatley - NP   famotidine (PEPCID) 20 MG tablet TAKE 1 TABLET BY MOUTH TWICE DAILY 8/10/20  Yes RAMONA Davis   insulin glargine (LANTUS SOLOSTAR) 100 UNIT/ML injection pen INJECT 10 UNITS INTO THE SKIN ONCE DAILY 7/4/20  Yes RAMONA Davis   pravastatin (PRAVACHOL) 10 MG tablet TAKE 1 TABLET BY MOUTH EVERY NIGHT AT BEDTIME 6/29/20  Yes RAMONA Davis   ipratropium-albuterol (DUONEB) 0.5-2.5 (3) MG/3ML SOLN nebulizer solution Inhale 3 mLs into the lungs every 4 hours 5/20/20  Yes RAMONA Davis   carvedilol (COREG) 12.5 MG tablet Take 12.5 mg by mouth 2 times daily (with meals)   Yes Historical Provider, MD   Cholecalciferol (VITAMIN D3) 1000 units CAPS Take by mouth   Yes Historical Provider, MD   Travoprost, BAK Free, (TRAVATAN Z) 0.004 % SOLN ophthalmic solution 1 drop nightly   Yes Historical Provider, MD   aspirin EC 81 MG EC tablet Take 81 mg by mouth daily   Yes Historical Provider, MD   timolol (TIMOPTIC-XR) 0.5 % ophthalmic gel-forming Place 1 drop into the left eye 2 times daily  7/3/12  Yes Historical Provider, MD   dorzolamide (TRUSOPT) 2 % ophthalmic solution Place 1 drop into the left eye 2 times daily    Yes Historical Provider, MD brimonidine (ALPHAGAN P) 0.15 % ophthalmic solution Place 1 drop into the left eye 3 times daily    Yes Historical Provider, MD   homatropine 5 % ophthalmic solution Place 1 drop into the left eye daily    Yes Historical Provider, MD   ondansetron (ZOFRAN) 4 MG tablet Take 4 mg by mouth every 6 hours    Historical Provider, MD   dicyclomine (BENTYL) 10 MG capsule Take 1 capsule by mouth 4 times daily 4/14/20   RAMONA Garcia   B-D ULTRAFINE III SHORT PEN 31G X 8 MM MISC USE ONCE DAILY TO INJECT INSULIN DAILY 3/16/20   RAMONA Garcia   lactulose ([employer]) 10 GM/15ML solution TAKE 30 ML BY MOUTH THREE TIMES DAILY 9/25/19   RAMONA Garcia   blood glucose monitor kit and supplies 1 kit by Other route 3 times daily Test three times a day & as needed for symptoms of irregular blood glucose. 7/11/18 10/16/20  Orest Cowden, APRN   midodrine (PROAMATINE) 5 MG tablet TK 1 T PO HALF WAY THROUGH TREATMENT AS DIRECTED 10/17/17   Historical Provider, MD   sevelamer (RENVELA) 800 MG tablet Take 1 tablet by mouth daily     Historical Provider, MD   fluticasone (FLONASE) 50 MCG/ACT nasal spray 2 sprays by Nasal route daily. To keep ears opened up 3/27/14   Maisie Boas, MD   Blood Glucose Monitoring Suppl NIKOS by Does not apply route. Pt will also need new lancet device if not included in kit. 12/9/13   Maisie Boas, MD   Glucose Blood (BLOOD GLUCOSE TEST STRIPS) STRP Check blood sugar twice a day for recent medication adjustments. 12/9/13   Maisie Boas, MD   bimatoprost 0.01 % SOLN Apply 1 drop to eye nightly. One drop in left eye at hs    Historical Provider, MD   Insulin Syringe-Needle U-100 (ACCUSURE INS SYR 1CC/31GX5/16\") 31G X 5/16\" 1 ML MISC by Does not apply route.  9/25/12   Maisie Boas, MD       Allergies:  Bupropion, Gabapentin, Sulfa antibiotics, Varenicline, Wellbutrin [bupropion hcl], Azithromycin, Levaquin [levofloxacin], and Penicillins    Social History:   Social History Socioeconomic History    Marital status:      Spouse name: Not on file    Number of children: Not on file    Years of education: Not on file    Highest education level: Not on file   Occupational History    Not on file   Social Needs    Financial resource strain: Not on file    Food insecurity     Worry: Not on file     Inability: Not on file    Transportation needs     Medical: Not on file     Non-medical: Not on file   Tobacco Use    Smoking status: Current Every Day Smoker     Packs/day: 1.00     Years: 40.00     Pack years: 40.00    Smokeless tobacco: Never Used   Substance and Sexual Activity    Alcohol use: No     Alcohol/week: 0.0 standard drinks    Drug use: Yes     Types: Marijuana    Sexual activity: Not Currently     Partners: Male   Lifestyle    Physical activity     Days per week: Not on file     Minutes per session: Not on file    Stress: Not on file   Relationships    Social connections     Talks on phone: Not on file     Gets together: Not on file     Attends Oriental orthodox service: Not on file     Active member of club or organization: Not on file     Attends meetings of clubs or organizations: Not on file     Relationship status: Not on file    Intimate partner violence     Fear of current or ex partner: Not on file     Emotionally abused: Not on file     Physically abused: Not on file     Forced sexual activity: Not on file   Other Topics Concern    Not on file   Social History Narrative    Not on file       Family History:   Family History   Problem Relation Age of Onset    Heart Disease Mother     Heart Attack Mother     Glaucoma Sister     Diabetes Sister     Diabetes Brother     Kidney Disease Brother     Blindness Brother     Stroke Maternal Grandmother     Stroke Maternal Grandfather     Colon Cancer Neg Hx     Colon Polyps Neg Hx     Liver Cancer Neg Hx     Liver Disease Neg Hx     Esophageal Cancer Neg Hx     Rectal Cancer Neg Hx  Stomach Cancer Neg Hx        REVIEW OF SYSTEMS:    UNABLE TO BE OBTAINED 2/2 CONFUSION    PHYSICAL EXAM:    Vitals:   Vitals:    02/21/21 2211 02/22/21 0013 02/22/21 0645 02/22/21 0708   BP:  130/85 128/74    Pulse: 82 85 77    Resp:  18 16 17   Temp:  97.2 °F (36.2 °C) 98.3 °F (36.8 °C)    TempSrc:  Temporal     SpO2:  95% 96% 96%   Weight:         General:  Appears stated age, no distress. Orientation:  Alert / CONFUSED. Mood and Affect:  Cooperative and pleasant. Gait:  Resting comfortably in bed. Cardiovascular:  Symmetric 1-2 plus pulses in upper and lower extremities. Lymph:  No cervical or inguinal lymphadenopathy noted. Sensation:  Grossly intact to light touch. DTR:  Normal, no pathologic reflexes. Coordination/balance:  NOT TESTED    Musculoskeletal:  Right upper extremity exam:  There is no tenderness to palpation about the shoulder, elbow, wrist or hand. Unrestricted full function motion is present. Stability is normal with provocative tests, 5/5 strength, and skin is normal.      Left upper extremity exam:  There is no tenderness to palpation about the shoulder, elbow, wrist or hand. Unrestricted full function motion is present. Stability is normal with provocative tests, 5/5 strength, and skin is normal.     Right lower extremity exam:  MILD TTP RIGHT GROIN, MINIMAL TENDERNESS TO RIGHT HIP FLEXION/EXT /INT ROT OF HIP. NVI DISTALLY    Left lower extremity exam:  There is no tenderness to palpation about the hip, knee, ankle or foot. Unrestricted full function motion is present.   Stability is normal with provocative tests, 5/5 strength, and skin is normal.      DATA:    CBC with Differential:    Lab Results   Component Value Date    WBC 3.6 02/22/2021    RBC 3.54 02/22/2021    HGB 10.9 02/22/2021    HCT 35.9 02/22/2021     02/22/2021    .4 02/22/2021    MCH 30.8 02/22/2021    MCHC 30.4 02/22/2021    RDW 17.9 02/22/2021    LYMPHOPCT 14.0 02/22/2021    MONOPCT 14.0 02/22/2021 BASOPCT 0.3 02/22/2021    MONOSABS 0.50 02/22/2021    LYMPHSABS 0.5 02/22/2021    EOSABS 0.10 02/22/2021    BASOSABS 0.00 02/22/2021     CMP:    Lab Results   Component Value Date     02/22/2021    K 4.3 02/22/2021    K 4.3 02/22/2021    CL 96 02/22/2021    CO2 25 02/22/2021    BUN 33 02/22/2021    CREATININE 4.3 02/22/2021    GFRAA 13 02/22/2021    LABGLOM 10 02/22/2021    GLUCOSE 88 02/22/2021    PROT 7.3 02/22/2021    PROT 6.7 11/13/2012    CALCIUM 10.0 02/22/2021    BILITOT 0.6 02/22/2021    ALKPHOS 297 02/22/2021    AST 42 02/22/2021    ALT 37 02/22/2021     BMP:    Lab Results   Component Value Date     02/22/2021    K 4.3 02/22/2021    K 4.3 02/22/2021    CL 96 02/22/2021    CO2 25 02/22/2021    BUN 33 02/22/2021    CREATININE 4.3 02/22/2021    CALCIUM 10.0 02/22/2021    GFRAA 13 02/22/2021    LABGLOM 10 02/22/2021    GLUCOSE 88 02/22/2021       Radiology:   EXAM: XR PELVIS (1-2 VIEWS) -- 2/21/2021 12:57 PM   HISTORY: 56 years, Female, pelvic fractures   COMPARISON: 10/10/2020   TECHNIQUE:  1 images.  AP view of the pelvis   FINDINGS:     Sacroiliac joints appear grossly symmetric. Pubic symphysis anatomic. Mildly displaced fracture at the right superior pubic ramus root. Mildly displaced fractures of the bilateral inferior pubic rami. There   may be a subtle fracture at the anterior aspect of the left superior   pubic ramus. Vascular calcifications.       Impression   1. Bilateral superior and inferior pubic rami fractures. Signed by Dr Kamilla Pike on 2/21/2021 2:26 PM           IMPRESSION/RECOMMENDATIONS:    Assessment:   BILATERAL PUBIC RAMI FX'S   CONFUSION  ESRD ON DIALYSIS  ELEVATED LIPASE  T2DM      Plan:  1. NONSURGICAL FOR RAMI FRACTURES. WILL ASK PT/OT TO EVAL. TTWB AND ADVANCE AS TOLERATED. CALL IF NEEDED. THANKS FOR THE CONSULT. Approximately 35 minutes was spent face to face with the patient discussing the current condition. All risks and benefits of treatment options were discussed at great length and all expressed understanding and agreement with medial reasoning.     Howie Ryan PA-C 02/22/21 8:11 AM

## 2021-02-22 NOTE — PROGRESS NOTES
 meropenem (MERREM) 500 mg in sterile water 10 mL IV syringe  500 mg Intravenous Q24H Armando Zazueta MD   500 mg at 02/21/21 1344    vancomycin (VANCOCIN) intermittent dosing (placeholder)   Other RX Placeholder Armando Zazueta MD        oxyCODONE-acetaminophen (PERCOCET) 5-325 MG per tablet 1 tablet  1 tablet Oral Q6H PRN Armando Zazueta MD   1 tablet at 02/20/21 1648    morphine injection 1 mg  1 mg Intravenous Q4H PRN Armando Zazueta MD   1 mg at 02/21/21 2209    ipratropium-albuterol ([place]) nebulizer solution 1 ampule  1 ampule Inhalation Q4H WA Armando Zazueta MD   1 ampule at 02/22/21 0708    aspirin EC tablet 81 mg  81 mg Oral Daily Armando Zazueta MD        albuterol (PROVENTIL) nebulizer solution 2.5 mg  2.5 mg Nebulization Q6H PRN Armando Zazueta MD        sevelamer (RENVELA) tablet 800 mg  800 mg Oral Daily Armando Zazueta MD        glucose (GLUTOSE) 40 % oral gel 15 g  15 g Oral PRN Armando Zazueta MD        dextrose 50 % IV solution  12.5 g Intravenous PRN Armando Zazueta MD   12.5 g at 02/19/21 2110    glucagon (rDNA) injection 1 mg  1 mg Intramuscular PRN Armando Zazueta MD        dextrose 5 % solution  100 mL/hr Intravenous PRN Armando Zazueta MD 50 mL/hr at 02/20/21 0054 50 mL/hr at 02/20/21 0054    sodium chloride flush 0.9 % injection 10 mL  10 mL Intravenous 2 times per day Armando Zazueta MD   10 mL at 02/20/21 0246    sodium chloride flush 0.9 % injection 10 mL  10 mL Intravenous PRN Armando Zazueta MD        potassium chloride (KLOR-CON M) extended release tablet 40 mEq  40 mEq Oral PRN Armando Zazueta MD        Or    potassium bicarb-citric acid (EFFER-K) effervescent tablet 40 mEq  40 mEq Oral PRN Armando Zazueta MD        Or    potassium chloride 10 mEq/100 mL IVPB (Peripheral Line)  10 mEq Intravenous PRN Armando Zazueta MD Labs:   CBC with DIFF:  Recent Labs     02/20/21 0227 02/21/21 0133 02/22/21 0219   WBC 3.2* 4.3* 3.6*   RBC 3.09* 3.43* 3.54*   HGB 9.7* 10.8* 10.9*   HCT 29.6* 34.5* 35.9*   MCV 95.8 100.6* 101.4*   MCH 31.4* 31.5* 30.8   MCHC 32.8* 31.3* 30.4*   RDW 17.5* 17.9* 17.9*   * 110* 119*   MPV 10.6 10.5 10.2   NEUTOPHILPCT 70.4* 69.9* 68.4*   LYMPHOPCT 13.3* 13.4* 14.0*   MONOPCT 11.1* 12.0* 14.0*   EOSRELPCT 4.3 3.5 3.0   BASOPCT 0.3 0.7 0.3   NEUTROABS 2.3 3.0 2.5   LYMPHSABS 0.4* 0.6* 0.5*   MONOSABS 0.40 0.50 0.50   EOSABS 0.10 0.20 0.10   BASOSABS 0.00 0.00 0.00       CMP/BMP:  Recent Labs     02/19/21  1154 02/19/21  1154 02/20/21 0227 02/21/21 0133 02/22/21 0219   *  --  139 139 140   K 5.3*   < > 4.3 3.5 4.3  4.3   CL 92*  --  97* 94* 96*   CO2 26  --  26 26 25   ANIONGAP 17  --  16 19 19   GLUCOSE 67*  --  83 137* 88   BUN 77*  --  47* 22 33*   CREATININE 7.2*  --  5.0* 3.3* 4.3*   LABGLOM 6*  --  9* 14* 10*   CALCIUM 9.7  --  8.8 9.3 10.0   PROT 7.6  --   --  7.1 7.3   LABALBU 4.4  --   --  4.4 4.6   BILITOT 0.4  --   --  0.5 0.6   ALKPHOS 353*  --   --  297* 297*   ALT 55*  --   --  37* 37*   AST 54*  --   --  37* 42*    < > = values in this interval not displayed. CRP:    Recent Labs     02/19/21  1154   CRP 7.45*     Sed Rate:    Recent Labs     02/19/21  1154   SEDRATE 30*         HgBA1c:  No components found for: HGBA1C  FLP:    Lab Results   Component Value Date    TRIG 65 02/19/2021    HDL 61 06/09/2020    LDLCALC 25 06/09/2020    LDLDIRECT 120 07/28/2015     TSH:  No results found for: TSH  Troponin T: No results for input(s): TROPONINI in the last 72 hours. Pro-BNP: No results for input(s): BNP in the last 72 hours.   INR:   Recent Labs     02/19/21  1154 02/20/21  0226   INR 1.22* 1.29*     ABGs:   Lab Results   Component Value Date    PHART 7.340 02/19/2021    PO2ART 72.0 02/19/2021    MZI6LQF 46.0 02/19/2021 UA:No results for input(s): NITRITE, COLORU, PHUR, LABCAST, WBCUA, RBCUA, MUCUS, TRICHOMONAS, YEAST, BACTERIA, CLARITYU, SPECGRAV, LEUKOCYTESUR, UROBILINOGEN, BILIRUBINUR, BLOODU, GLUCOSEU, AMORPHOUS in the last 72 hours. Invalid input(s): [place]ec[place]      Culture Results:    No results for input(s): CXSURG in the last 720 hours. Blood Culture Recent:   Recent Labs     02/19/21  1154   BC No Growth to date. Any change in status will be called. Cultures:   No results for input(s): CULTURE in the last 72 hours. Recent Labs     02/19/21  1154 02/19/21  1201   BC No Growth to date. Any change in status will be called. --    BLOODCULT2  --  No Growth to date. Any change in status will be called. No results for input(s): CXSURG in the last 72 hours.       Recent Labs     02/21/21  0133   MG 2.3     Recent Labs     02/19/21  1154 02/21/21  0133 02/22/21  0219   AST 54* 37* 42*   ALT 55* 37* 37*   BILITOT 0.4 0.5 0.6   ALKPHOS 353* 297* 297*         RAD:   Ct Head Wo Contrast    Result Date: 2/19/2021 Examination. CT HEAD WO CONTRAST 2/19/2021 12:55 PM History: Altered mental status. DLP: 1305 mGycm. The CT scan of the head is performed without intravenous contrast enhancement. The images are acquired in axial plane and subsequent reconstruction in coronal and sagittal planes. The comparison is made with the previous study dated 9/1/2020. There is no evidence of mass. No midline shift. There is no evidence of an intracranial hemorrhage or hematoma. Moderately dilated ventricles, basal cistern and the cortical sulci are similar to the previous study suggesting a chronic volume loss. A few scattered areas of chronic white matter ischemia in the centrum semiovale bilaterally are stable. The gray-white matter differentiation is maintained. A partially empty sella turcica is seen. The images reviewed in bone window show no acute bony abnormality. The visualized paranasal sinuses and mastoid air cells are clear. A curvilinear metallic density in the lateral margin of the left globe/orbit is similar to the previous study. No acute intracranial abnormality.  Signed by Dr Anahi Ramirez on 2/19/2021 2:38 PM    Ct Chest W Contrast    Result Date: 2/19/2021 EXAM: CT CHEST W CONTRAST -- 2/19/2021 12:55 PM HISTORY: 56 years, Female, abnormal chest x-ray COMPARISON: 2/19/2021 DLP: 769.5 mGy cm. Automated exposure control was utilized to minimize patient radiation dose. TECHNIQUE: Enhanced  CT images of the chest obtained with multiplanar reformats. FINDINGS: AIRWAYS/PULMONARY PARENCHYMA: The central airways are midline and patent. Expiratory phase of imaging. Small right pleural effusion with compressive enhancing breast atelectasis involving the right middle and lower lobes. Right upper lobe with a pleural-based 1.7 x 2.0 cm opacity, which could represent round atelectasis, scarring or nodule. VASCULATURE: Thoracic aorta is normal in course and caliber. Mild calcified aortic atherosclerosis. Normal pulmonary artery caliber. No large central pulmonary artery filling defect on this non-CTA exam. Left subclavian vein with short segment severe stenosis on axial image 29. This is just lateral to the stent. There are left chest wall collaterals suggesting that this is a chronic finding. CARDIAC:  Borderline heart size. No pericardial effusion. Scattered coronary artery calcifications. MEDIASTINUM: There is no mediastinal or hilar lymphadenopathy by CT size criteria. Esophagus has normal coarse, caliber and wall thickness. EXTRATHORACIC: The visualized portions of the thyroid gland are unremarkable. No thoracic inlet or axillary adenopathy. Body wall edema. Left chest wall collaterals. INCLUDED UPPER ABDOMEN: The liver appears prominent low density, which could be due to fatty infiltration or phase of contrast. Cholecystectomy clips. No bile duct dilation. Small ascites. There is wedge-shaped low-density in the spleen, concerning for age-indeterminate splenic infarct. Visualized portion of the pancreas and adrenal glands appear within normal limits. Atrophic kidneys partially visualized. OSSEOUS: Mild degenerative changes in the spine. No acute or aggressive bony finding. 1. Right pleural effusion with compressive atelectasis of the right middle and lower lobes. 2. Right upper lobe with a pleural-based 1.7 x 2.0 cm opacity, which could represent round atelectasis, scarring or nodule. Recommend follow-up CT chest in 3 months per Fleischner criteria. 3. Left subclavian vein with a short segment severe stenosis, just lateral to the stent. Left chest wall collaterals suggest chronicity. 4. Borderline heart size with scattered coronary artery calcifications. 5. Wedge-shaped low-density in the spleen concerning for age-indeterminate splenic infarct. 6. Low-density of the liver which could be seen with fatty infiltration or exaggerated due to phase of contrast. Small volume ascites.  Signed by Dr Jemma Camp on 2/19/2021 2:42 PM    Ct Abdomen Pelvis W Iv Contrast Additional Contrast? None    Result Date: 2/19/2021 EXAM: CT ABDOMEN PELVIS W IV CONTRAST -- 2/19/2021 12:55 PM HISTORY: 56 years, Female, markedly elevated lipase, abdominal pain, diarrhea, ESRD on hemodialysis COMPARISON: No existing relevant imaging studies available DLP: 769.5 mGy cm. Automated exposure control was utilized to minimize patient radiation dose. TECHNIQUE: Enhanced CT images obtained of the abdomen and pelvis with multiplanar reformats. FINDINGS: LUNG BASES: Right pleural effusion with compressive atelectasis. Underlying inferior heart size with scattered coronary artery calcifications. LIVER: Diffuse low-density of the liver concerning for fatty liver. Liver appears mildly enlarged. Patent portal and hepatic veins. GALLBLADDER and BILARY: Cholecystectomy clips. No bile duct dilation. PANCREAS: Normal enhancement of the pancreas. There is ascites and mesenteric edema throughout the abdomen, which limited evaluation of the peripancreatic fat. SPLEEN: There is a wedge-shaped hypodensity in the spleen concerning for infarct. Splenic vein appears grossly patent. Normal caliber splenic artery. ADRENALS: No adrenal mass. URINARY: Atrophic kidneys. No hydronephrosis. Renal vascular calcifications. No convincing nephrolithiasis. Urinary bladder is underdistended, which limits evaluation. Grossly normal CT appearance of the uterus. Adnexa are not discretely identified. PERITONEUM: No ascites/free fluid. No pneumoperitoneum. BOWEL: Stomach and duodenum have normal course and caliber. No abnormally dilated loops of bowel or bowel wall thickening. Normal short appendix or appendiceal stump on axial images 61-57. RETROPERITONEUM:  Aorta normal in course and caliber with calcified atherosclerosis. Celiac and superior mesenteric arteries patent. Bilateral renal arteries diminutive. Inferior mesenteric artery opacified.  Heavily calcified iliac arteries, limited in evaluation on this non-CTA exam. Scattered retroperitoneal lymph nodes, not enlarged by CT size criteria, which may be reactive. ABDOMINAL WALL: Significant body wall edema. Prominent vessel in the partially visualized left distal upper arm, possibly fistula. OSSEOUS: Subacute to chronic right superior pubic ramus, right inferior pubic ramus and left inferior pubic rami fractures. 1. Ascites and mesenteric edema Limited evaluation of the peripancreatic fat. Normal enhancement of the pancreas. 2. Wedge-shaped hypodensity in the spleen concerning for age-indeterminate infarct. Splenic vein and artery appear patent and normal caliber. 3. Enlarged low-density liver concerning for fatty liver. 4. Right pleural effusion, ascites, body wall edema suggests fluid overload. 5. Heavily calcified atherosclerosis. 6. Subacute to chronic appearing pubic rami fractures. 7. See separately dictated CT chest of the same day. Signed by Dr Danilo Velez on 2/19/2021 2:54 PM    Xr Chest Portable    Result Date: 2/19/2021  XR CHEST PORTABLE 2/19/2021 10:18 AM HISTORY: Shortness of breath COMPARISON: Chest x-ray dated 10/10/2020. FINDINGS: Mild cardiomegaly which is stable. Bilateral coarsened interstitial markings with large layering right pleural effusion. No pneumothorax. Vascular stent along the left brachiocephalic vein. No acute bony abnormality. 1. Interstitial edema with large layering right pleural effusion. Signed by Dr Amanda Ríos on 2/19/2021 11:44 AM      Recent 2D ECHOCARDIOGRAM  05/08/2020    Summary   LV is normal in size with normal systolic function. LV ejection fraction   estimated at 50-60%.  Mild concentric LVH. Probable grade 2 diastolic dysfunction.   RV is normal in size with normal systolic function.   Mild to moderate left atrial enlargement.   Normal right atrial size.   Aortic valve is trileaflet with mild leaflet thickening. Normal opening.   No stenosis or regurgitation noted.   Mitral valve is mild to moderately thickened with normal leaflet mobility.  Likely Severe (3-4+) mitral regurgitation is present. No stenosis.   Mild tricuspid regurgitation.  RVSP estimated at 50 mmHg.      Signature    ----------------------------------------------------------------   Electronically signed by Isaiah Rinne Mani,MD(Interpreting physician)   on 05/08/2020 07:30 PM   ----------------------------------------------------------------        Objective:   Vitals:   /74   Pulse 77   Temp 98.3 °F (36.8 °C)   Resp 17   Wt 144 lb 2 oz (65.4 kg)   SpO2 96%   BMI 29.11 kg/m²     Patient Vitals for the past 24 hrs:   BP Temp Temp src Pulse Resp SpO2 Weight   02/22/21 0708     17 96 %    02/22/21 0645 128/74 98.3 °F (36.8 °C)  77 16 96 %    02/22/21 0013 130/85 97.2 °F (36.2 °C) Temporal 85 18 95 %    02/21/21 2211    82      02/21/21 1854 136/72 96.7 °F (35.9 °C) Temporal 82 18 92 % 144 lb 2 oz (65.4 kg)   02/21/21 1159 135/76 97.6 °F (36.4 °C)  74 20 91 %        24HR INTAKE/OUTPUT:    No intake or output data in the 24 hours ending 02/22/21 0748    Physical Exam  Vitals signs and nursing note reviewed. Constitutional:       General: She is not in acute distress. Appearance: Normal appearance. She is ill-appearing. She is not toxic-appearing or diaphoretic. HENT:      Head: Normocephalic and atraumatic. Right Ear: External ear normal.      Left Ear: External ear normal.      Nose: Nose normal. No congestion or rhinorrhea. Eyes:      General: No scleral icterus. Right eye: No discharge. Left eye: No discharge. Extraocular Movements: Extraocular movements intact. Conjunctiva/sclera: Conjunctivae normal.   Neck:      Musculoskeletal: Normal range of motion and neck supple. No neck rigidity or muscular tenderness. Vascular: No carotid bruit. Cardiovascular:      Rate and Rhythm: Normal rate and regular rhythm. Pulses: Normal pulses. Heart sounds: Normal heart sounds. No friction rub. No gallop.     Pulmonary: Effort: Pulmonary effort is normal. No respiratory distress. Breath sounds: Normal breath sounds. No stridor. No wheezing, rhonchi or rales. Comments: Diminished breath sounds bilaterally  Chest:      Chest wall: No tenderness. Abdominal:      General: Bowel sounds are normal. There is no distension. Palpations: Abdomen is soft. Tenderness: There is abdominal tenderness. There is no guarding or rebound. Comments: Diffuse tenderness to palpation. No guarding, rigidity, rebound tenderness noted/elicited   Musculoskeletal: Normal range of motion. General: No swelling, tenderness, deformity or signs of injury. Right lower leg: Edema present. Left lower leg: Edema present. Skin:     General: Skin is warm and dry. Capillary Refill: Capillary refill takes less than 2 seconds. Coloration: Skin is not jaundiced or pale. Findings: No bruising, erythema, lesion or rash. Neurological:      General: No focal deficit present. Mental Status: She is alert. She is disoriented. Cranial Nerves: No cranial nerve deficit. Psychiatric:         Mood and Affect: Mood normal.      Comments: Unable to assess judgment and thought content.            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 *        Brief Summary  Ms. Frederick Lamb a 56 y.o. female with a history of DMT2, ESRD, on HD, CHF, presenting to [address] [place] [address] ED (2021) on account of diarrhea, with an associated abdominal/back pain, generalized weakness and lethargy.     Patient reportedly has missed 1 session of HD, due to the symptoms noted above.     Patient reportedly is on baseline home oxygen.     Initial work-up significant for;  AB.3 4///20 4.8  Hyperkalemia, with a potassium of 5.3  Initial BUN/creatinine of 77/7.2  Hypoglycemia with initial glucose of 67  Leukopenia, with WBC of 3.7  Lactic acid within lab reference range: 0.8  Markedly elevated lipase: 1,492  Mildly elevated ESR: 30 mm/hr  Elevated CRP: 7.45  Molecular respiratory panel with COVID-19 grossly negative  Patient admitted for further work-up and management.     Generalized weakness  Diarrhea  Altered mental status  Transaminitis  · Initial work-up (2021) significant for;  · AB.3 4//72/20 4.8  · Leukopenia, with WBC of 3.7  · Lactic acid within lab reference range: 0.8  · Markedly elevated lipase: 1,492 (2021)  · Mildly elevated ESR: 30 mm/hr  · Elevated CRP: 7.45  · Mildly elevated ALT/AST: 55/54  · Elevated alkaline phosphatase: 353  · Molecular respiratory panel with COVID-19 grossly negative -2021  · Blood cultures no growth to date  · Mildly elevated Procalcitonin level: 0.24 (2021)  · Elevated CRP: 7.45 [0.00 - 0.50 mg/dL]  · Mildly elevated ESR  · Elevated GGT  · Elevated serum ferritin  · Triglyceride level within lab reference range  · Ammonia level  · Acute viral hepatitis panel grossly negative  · HIV rapid 1 & 2 screen negative  · Acetaminophen level within lab reference range · Ethanol level < 10  Mg/dL  · Elevated GGT level  · LDH within lab reference range  · Stool for C. difficile antigen toxin as well as ova and parasites. · Empiric antibiotic (Vancomycin and Meropenem), pending blood cultures  · CT head without contrast, with no acute intracranial abnormality. · CT chest with contrast (02/19/2021): Impression: Right pleural effusion with compressive atelectasis of the right middle and lower lobes. . Right upper lobe with a pleural-based 1.7 x 2.0 cm opacity, which could represent round atelectasis, scarring or nodule. Recommend follow-up CT chest in 3 months per Fleischner criteria. Left subclavian vein with a short segment severe stenosis, just lateral to the stent. Left chest wall collaterals suggest chronicity. . Borderline heart size with scattered coronary artery calcifications. . Wedge-shaped low-density in the spleen concerning for age-indeterminate splenic infarct. Low-density of the liver which could be seen with fatty infiltration or exaggerated due to phase of contrast. Small volume ascites. · CT abdomen pelvis with IV contrast (02/19/2021): Impression: Ascites and mesenteric edema Limited evaluation of the peripancreatic fat. Normal enhancement of the pancreas. Wedge-shaped hypodensity in the spleen concerning for age-indeterminate infarct. Splenic vein and artery appear patent andmnormal caliber. Enlarged low-density liver concerning for fatty liver. Right pleural effusion, ascites, body wall edema suggests fluid overload. Heavily calcified atherosclerosis. Subacute to chronic appearing pubic rami fractures. · F/u US Abdominal complete (02/21/2021)  · GI following recommendations much appreciated       Subacute to chronic pubic rami fractures.   · Continue supportive symptomatic management including pain management as needed  · Seen by Orthopedic surgeryappreciate recommendations  · Continue PT OT      History of ESRD, on scheduled HD Tues_Thur_Sat · Hyperkalemia, resolved  · Initial BUN/creatinine of 77/7.2  · Reportedly missed 1 session of HD (02/18/2021), prior to presentation  · Nephrology followingappreciate recommendations  · Continue scheduled HD  · Continue management as per nephrology rec        History of insulin-dependent Diabetes Mellitus II  · Hypoglycemic on presentation  · Hemoglobin A1C: 6.7% (02/19/2021)  · Inpatient Regimens to include;  ? -Hold basal Insulin Glargine (Lantus), for now, given concern for hypoglycemia  ? - Insulin Lispro (Humalog) on a Low dose sliding scale  · Monitor POC glucose, and adjust insulin regimen accordingly based on daily insulin requirement.         Continue management of other chronic medical conditions - see above and orders. Advance Directive: Full Code    DIET CARB CONTROL; Renal         Consults Made:   IP CONSULT TO NEPHROLOGY  IP CONSULT TO NEPHROLOGY  IP CONSULT TO SOCIAL WORK  IP CONSULT TO GI  PHARMACY TO DOSE VANCOMYCIN  IP CONSULT TO ORTHOPEDIC SURGERY    DVT prophylaxis: Heparin      Discharge planning:   Mental status improving  Continue current management and work-up as noted above. Time Spent is 25 mins in the examination, evaluation, counseling and review of medications, assessment and plan.      Electronically signed by   Mariam Pena MD, MPH,   Internal Medicine Hospitalist   2/22/2021 7:48 AM

## 2021-02-22 NOTE — PROGRESS NOTES
Occupational Therapy   Occupational Therapy Initial Assessment  Date: 2021   Patient Name: Georgie Gipson  MRN: [id number]     : 1960    Date of Service: 2021    Discharge Recommendations:          Assessment   Performance deficits / Impairments: Decreased functional mobility ; Decreased ADL status; Decreased endurance;Decreased balance;Decreased strength  Assessment: Will progress as tolerated. Hopefully more alert next session  Treatment Diagnosis: Pelvic Fx  Prognosis: Good  Decision Making: Low Complexity  Activity Tolerance  Activity Tolerance: Treatment limited secondary to decreased cognition           Patient Diagnosis(es): The primary encounter diagnosis was ESRD on hemodialysis ([facility] [address] Diagnoses of Diarrhea, unspecified type, Other fatigue, General weakness, Elevated lipase, Hypoglycemia, Tobacco abuse, Chronic respiratory failure with hypoxia ([address] and Pleural effusion were also pertinent to this visit. has a past medical history of Blind right eye, Blindness of one eye, CHF (congestive heart failure) ([address] CKD (chronic kidney disease), stage IV ([address] Diabetes ([address] Diabetes mellitus with ophthalmic manifestations, Glaucoma, Hemodialysis patient ([address] Hypertension, Obesity, Renal osteodystrophy, Smoker, and Type II or unspecified type diabetes mellitus with renal manifestations, uncontrolled(250.42). has a past surgical history that includes Cholecystectomy;  section; Cataract removal; eye surgery; Tympanostomy tube placement (Bilateral); Carpal tunnel release; Dialysis fistula creation (Left, 4/23/15 [facility]); vascular surgery (Left, 5/11/15 [facility]); vascular surgery (Left, 5/28/15 [facility]); vascular surgery (Left, 6/15/15 [facility]); vascular surgery (05/15/2017); vascular surgery (2018); Colonoscopy (N/A, 3/9/2018); and vascular surgery (2019).     Treatment Diagnosis: Pelvic Fx      Restrictions  Restrictions/Precautions Restrictions/Precautions: (TTWB on RLE and progress as tolerated)  Lower Extremity Weight Bearing Restrictions  Right Lower Extremity Weight Bearing: Weight Bearing As Tolerated  Left Lower Extremity Weight Bearing: Weight Bearing As Tolerated    Subjective   General  Chart Reviewed: Yes  Patient assessed for rehabilitation services?: Yes  Additional Pertinent Hx: ESRD, no dialysis in last week. Family / Caregiver Present: No  Diagnosis: Pelvic fx. Patient Currently in Pain: Denies  Vital Signs  Patient Currently in Pain: Denies  Social/Functional History  Social/Functional History  Lives With: Spouse  ADL Assistance: Independent  Ambulation Assistance: Independent  Transfer Assistance: Independent  Additional Comments: Information per chart review. Pt. unable to answer and give PLOF       Objective   Vision: Within Functional Limits  Hearing: Within functional limits    Orientation  Overall Orientation Status: Impaired  Orientation Level: Oriented to person;Disoriented to time;Disoriented to situation        ADL  Feeding: Dependent/Total  Grooming: Dependent/Total  UE Bathing: Dependent/Total  LE Bathing: Dependent/Total  UE Dressing: Dependent/Total  LE Dressing: Dependent/Total  Toileting: Dependent/Total  Additional Comments: pt. with limited alertness and verbalizations at [facility].        Bed mobility  Supine to Sit: Moderate assistance;2 Person assistance;Maximum assistance  Sit to Supine: Maximum assistance;2 Person assistance  Transfers  Stand Step Transfers: 2 Person assistance;Minimal assistance  Sit to stand:  Moderate assistance;2 Person assistance     Cognition  Overall Cognitive Status: WFL                 LUE AROM (degrees)  LUE AROM : WFL  RUE AROM (degrees)  RUE AROM : WFL  LUE Strength  Gross LUE Strength: Exceptions to [facility]  L Shoulder Flex: 3+/5  L Shoulder ABduction: 3+/5  L Elbow Flex: 3+/5  L Elbow Ext: 3+/5  RUE Strength  Gross RUE Strength: Exceptions to [facility]  R Shoulder Flex: 3+/5 R Shoulder ABduction: 3+/5  R Elbow Flex: 3+/5  R Elbow Ext: 3+/5                   Plan   Plan  Times per week: 3-5x/week  Times per day: Daily    G-Code     OutComes Score                                                  AM-PAC Score             Goals  Short term goals  Time Frame for Short term goals: 1 week  Short term goal 1: Birgit with toilet tfers  Short term goal 2: Birgit with clothing mgmt in standing  Short term goal 3: Birgit with seated LB dsg.   Long term goals  Long term goal 1: Return to PLOF       Therapy Time   Individual Concurrent Group Co-treatment   Time In           Time Out           Minutes                   JOSELYN Westbrook/L

## 2021-02-22 NOTE — CARE COORDINATION
Went to go see pt re: dc planning. PT sitting up in bed. Much more alert and able to answer questions. Upon discussiong dc plans, pt stated she lives at home with her boyhola and plans on same uopn dc. During conversation, pts boyfriend Burt Leary) came into room and joined in conversation. Qiana Santa stated they have a walker /wc and cane at home, she takes the [employer] bus to and from   [address]. Up to this hospitaization pt was able to do adls by herself. Discussed the possiblity of NHP,  Qiana Santa was agreeable, but pt became tearful and said she was not wanting to go to a snf. Will give pt/Benny time to discuss option further and will follow back up with them in morning. At this time pt would not give consent to referals.    Electronically signed by Cristin Jain RN on 2/22/2021 at 4:41 PM

## 2021-02-23 LAB
ANA IGG, ELISA: NORMAL
ANION GAP SERPL CALCULATED.3IONS-SCNC: 22 MMOL/L (ref 7–19)
BASOPHILS ABSOLUTE: 0 K/UL (ref 0–0.2)
BASOPHILS RELATIVE PERCENT: 0.2 % (ref 0–1)
BUN BLDV-MCNC: 41 MG/DL (ref 8–23)
CALCIUM SERPL-MCNC: 9.7 MG/DL (ref 8.8–10.2)
CHLORIDE BLD-SCNC: 93 MMOL/L (ref 98–111)
CO2: 24 MMOL/L (ref 22–29)
CREAT SERPL-MCNC: 5.5 MG/DL (ref 0.5–0.9)
EKG P AXIS: 72 DEGREES
EKG P-R INTERVAL: 176 MS
EKG Q-T INTERVAL: 414 MS
EKG QRS DURATION: 86 MS
EKG QTC CALCULATION (BAZETT): 424 MS
EKG T AXIS: 12 DEGREES
EOSINOPHILS ABSOLUTE: 0.2 K/UL (ref 0–0.6)
EOSINOPHILS RELATIVE PERCENT: 4 % (ref 0–5)
F-ACTIN AB IGG: 5 UNITS (ref 0–19)
GAMMA GLUTAMYL TRANSFERASE: 155 U/L (ref 7–54)
GFR AFRICAN AMERICAN: 9
GFR NON-AFRICAN AMERICAN: 8
GLUCOSE BLD-MCNC: 107 MG/DL (ref 70–99)
GLUCOSE BLD-MCNC: 130 MG/DL (ref 74–109)
GLUCOSE BLD-MCNC: 149 MG/DL (ref 70–99)
GLUCOSE BLD-MCNC: 185 MG/DL (ref 70–99)
GLUCOSE BLD-MCNC: 94 MG/DL (ref 70–99)
HCT VFR BLD CALC: 35 % (ref 37–47)
HCV QNT BY NAAT IU/ML: NOT DETECTED IU/ML
HCV QNT BY NAAT LOG IU/ML: NOT DETECTED LOG IU/ML
HEMOGLOBIN: 10.7 G/DL (ref 12–16)
IMMATURE GRANULOCYTES #: 0 K/UL
INTERPRETATION: NOT DETECTED
LIPASE: 69 U/L (ref 13–60)
LYMPHOCYTES ABSOLUTE: 0.5 K/UL (ref 1.1–4.5)
LYMPHOCYTES RELATIVE PERCENT: 11.4 % (ref 20–40)
MCH RBC QN AUTO: 30.6 PG (ref 27–31)
MCHC RBC AUTO-ENTMCNC: 30.6 G/DL (ref 33–37)
MCV RBC AUTO: 100 FL (ref 81–99)
MITOCHONDRIAL M2 AB, IGG: 3.8 UNITS (ref 0–24.9)
MONOCYTES ABSOLUTE: 0.6 K/UL (ref 0–0.9)
MONOCYTES RELATIVE PERCENT: 13.3 % (ref 0–10)
NEUTROPHILS ABSOLUTE: 3 K/UL (ref 1.5–7.5)
NEUTROPHILS RELATIVE PERCENT: 70.4 % (ref 50–65)
PDW BLD-RTO: 17.7 % (ref 11.5–14.5)
PERFORMED ON: ABNORMAL
PERFORMED ON: NORMAL
PLATELET # BLD: 133 K/UL (ref 130–400)
PMV BLD AUTO: 10.4 FL (ref 9.4–12.3)
POTASSIUM REFLEX MAGNESIUM: 4.3 MMOL/L (ref 3.5–5)
RBC # BLD: 3.5 M/UL (ref 4.2–5.4)
SODIUM BLD-SCNC: 139 MMOL/L (ref 136–145)
VANCOMYCIN RANDOM: 5.4 UG/ML
WBC # BLD: 4.3 K/UL (ref 4.8–10.8)

## 2021-02-23 PROCEDURE — 82977 ASSAY OF GGT: CPT

## 2021-02-23 PROCEDURE — 80202 ASSAY OF VANCOMYCIN: CPT

## 2021-02-23 PROCEDURE — 8010000000 HC HEMODIALYSIS ACUTE INPT

## 2021-02-23 PROCEDURE — 80048 BASIC METABOLIC PNL TOTAL CA: CPT

## 2021-02-23 PROCEDURE — 99232 SBSQ HOSP IP/OBS MODERATE 35: CPT | Performed by: INTERNAL MEDICINE

## 2021-02-23 PROCEDURE — 2580000003 HC RX 258: Performed by: INTERNAL MEDICINE

## 2021-02-23 PROCEDURE — 83690 ASSAY OF LIPASE: CPT

## 2021-02-23 PROCEDURE — 1210000000 HC MED SURG R&B

## 2021-02-23 PROCEDURE — 94640 AIRWAY INHALATION TREATMENT: CPT

## 2021-02-23 PROCEDURE — 6370000000 HC RX 637 (ALT 250 FOR IP): Performed by: INTERNAL MEDICINE

## 2021-02-23 PROCEDURE — 36415 COLL VENOUS BLD VENIPUNCTURE: CPT

## 2021-02-23 PROCEDURE — 97530 THERAPEUTIC ACTIVITIES: CPT

## 2021-02-23 PROCEDURE — 6360000002 HC RX W HCPCS: Performed by: INTERNAL MEDICINE

## 2021-02-23 PROCEDURE — 82947 ASSAY GLUCOSE BLOOD QUANT: CPT

## 2021-02-23 PROCEDURE — 85025 COMPLETE CBC W/AUTO DIFF WBC: CPT

## 2021-02-23 RX ADMIN — HEPARIN SODIUM 5000 UNITS: 5000 INJECTION INTRAVENOUS; SUBCUTANEOUS at 05:01

## 2021-02-23 RX ADMIN — Medication 10 ML: at 12:30

## 2021-02-23 RX ADMIN — HEPARIN SODIUM 5000 UNITS: 5000 INJECTION INTRAVENOUS; SUBCUTANEOUS at 21:21

## 2021-02-23 RX ADMIN — IPRATROPIUM BROMIDE AND ALBUTEROL SULFATE 1 AMPULE: .5; 3 SOLUTION RESPIRATORY (INHALATION) at 06:42

## 2021-02-23 RX ADMIN — MEROPENEM 500 MG: 500 INJECTION, POWDER, FOR SOLUTION INTRAVENOUS at 15:52

## 2021-02-23 RX ADMIN — HEPARIN SODIUM 5000 UNITS: 5000 INJECTION INTRAVENOUS; SUBCUTANEOUS at 12:24

## 2021-02-23 RX ADMIN — Medication 81 MG: at 12:24

## 2021-02-23 RX ADMIN — IPRATROPIUM BROMIDE AND ALBUTEROL SULFATE 1 AMPULE: .5; 3 SOLUTION RESPIRATORY (INHALATION) at 19:34

## 2021-02-23 RX ADMIN — INSULIN LISPRO 1 UNITS: 100 INJECTION, SOLUTION INTRAVENOUS; SUBCUTANEOUS at 21:22

## 2021-02-23 RX ADMIN — IPRATROPIUM BROMIDE AND ALBUTEROL SULFATE 1 AMPULE: .5; 3 SOLUTION RESPIRATORY (INHALATION) at 15:40

## 2021-02-23 RX ADMIN — OXYCODONE HYDROCHLORIDE AND ACETAMINOPHEN 1 TABLET: 5; 325 TABLET ORAL at 12:30

## 2021-02-23 RX ADMIN — Medication 10 ML: at 21:22

## 2021-02-23 RX ADMIN — FAMOTIDINE 10 MG: 20 TABLET, FILM COATED ORAL at 12:24

## 2021-02-23 RX ADMIN — Medication 1 MG: at 23:28

## 2021-02-23 RX ADMIN — VANCOMYCIN HYDROCHLORIDE 1000 MG: 10 INJECTION, POWDER, LYOPHILIZED, FOR SOLUTION INTRAVENOUS at 17:23

## 2021-02-23 ASSESSMENT — PAIN SCALES - GENERAL
PAINLEVEL_OUTOF10: 7
PAINLEVEL_OUTOF10: 9
PAINLEVEL_OUTOF10: 7

## 2021-02-23 NOTE — CARE COORDINATION
Went to go back in today to readdress dc planning. Pt sitting up in bed rocking back and forth and moaning. Pt would not talk to me or attempt to answer any of my questions. Pt would just say \"oh\" everytime I tried to talk to her. Will try again later, or when pts boyfriend arrives at visiting hours.   Electronically signed by Eneida Anderson RN on 2/23/2021 at 1:44 PM

## 2021-02-23 NOTE — CARE COORDINATION
Went back in to see pt again to try and talk with her about dc plans, pt had her eyes closed and answered verbally when I called her by name, but when she opened her eyes and seen me, pt refused to speak with me. Pt was sitting straight up in bed cross-legged and reusing to keep her gown on. Nurse aware.    Electronically signed by Lilian Orourke RN on 2/23/2021 at 4:02 PM

## 2021-02-23 NOTE — PROGRESS NOTES
IV started in R shoulder and merrem given. Asked patient if she could hear my voice and understand me, she replied with a whimpered \"mhmm\". She was instructed to not remove the IV that was just placed or else senseless needle sticks would ensue so that IV access could be established. She nodded in agreement.     Electronically signed by Kevin Fernandes RN on 2/22/2021 at 10:34 PM

## 2021-02-23 NOTE — PROGRESS NOTES
Pharmacy Vancomycin Consult     Vancomycin Day: 4  Current Dosing: Pulse dosing secondary to Tu-Th-Sa HD    Temp max:  98.6    Recent Labs     02/22/21  0219 02/23/21  0151   BUN 33* 41*       Recent Labs     02/22/21  0219 02/23/21 0151   CREATININE 4.3* 5.5*       Recent Labs     02/22/21  0219 02/23/21 0151   WBC 3.6* 4.3*         Intake/Output Summary (Last 24 hours) at 2/23/2021 1540  Last data filed at 2/23/2021 1244  Gross per 24 hour   Intake 480 ml   Output 6000 ml   Net -5520 ml       Culture Date Source Results   02/19/21 Blood x 2 No growth to date       Ht Readings from Last 1 Encounters:   11/11/20 4' 11\" (1.499 m)        Wt Readings from Last 1 Encounters:   02/22/21 145 lb 6 oz (65.9 kg)         Body mass index is 29.36 kg/m². CrCl cannot be calculated (Unknown ideal weight.). Random: 5.4    Assessment/Plan: Give Vancomycin 1 gm IV x 1. Draw Vancomycin random level after HD Thursday. Thank you for the consult. Will continue to follow.      Electronically signed by Flower Cruz, [address] [facility] on 2/23/2021 at 3:40 PM

## 2021-02-23 NOTE — PROGRESS NOTES
55 yo female seen in GI consultation by Dr Sierra Camp for pancreatitis and now seen in follow-up. Pancreatitis pain better. Serum lipase rapidly improved from 1492 to 565 over the weekend. CT abdomen showed no hemorrhagic change of pancreas and no mass. RUQ US showed the GB surgically absent and CBD 1.0 cm unchanged from prior study. Occult CBD stone is thought to be less likely. Plan: recheck lipase and GGT, defer ERCP or EUS unless her pancreatitis becomes a chronic relapsing problem. Avoid medications that can induce pancreatitis. Please reconsult as needed.

## 2021-02-23 NOTE — PROGRESS NOTES
[facility]    Patient:  Collins Bingham  YOB: 1960  Date of Service: 2/23/2021  MRN: [id number]   Acct: [de-identified]   Primary Care Physician: RAMONA Ford  Advance Directive: Full Code  Admit Date: 2/19/2021       Hospital Day: 4        CHIEF COMPLAINT:    Chief Complaint   Patient presents with    Diarrhea     pt missed dialysis on Thursday.  Failure To Thrive       2/23/2021 7:59 AM  Subjective / Interval History:   02/23/2021  Patient seen and examined. Doing well. No new complaints. No acute changes or acute overnight event reported. Mental status improved tolerating HD.    02/22/2021  Patient seen and examined this AM.  More awake and alert to the same. No new complaints. No acute changes or acute overnight events reported. Patient oriented to self. Continues to have some intermittent groaning. No specific localized pain reported or identified. 02/21/2021  Patient seen and examined this AM.  Reportedly agitated with continued moaning however with no specific complaint overnight. No changes in mental status. Patient more awake and alert this a.m. though. Laying comfortably in bed in no apparent acute distress. 02/20/2021  Patient seen and and examined. Continues to be confused with intermittent jerky movements in upper extremity. No other acute changes or acute overnight event reported. Tolerated HD yesterday, and currently receiving a second session of HD. Review of Systems:   Review of Systems   Unable to perform ROS: Mental status change     DIET CARB CONTROL;  Renal    Intake/Output Summary (Last 24 hours) at 2/23/2021 8095  Last data filed at 2/22/2021 1902  Gross per 24 hour   Intake 290 ml   Output    Net 290 ml       Medications:   dextrose 50 mL/hr (02/20/21 0054)     Current Facility-Administered Medications   Medication Dose Route Frequency Provider Last Rate Last Admin  promethazine (PHENERGAN) tablet 12.5 mg  12.5 mg Oral Q6H PRN Trace Norton MD        Or    [facility] 4 mg  4 mg Intravenous Q6H PRN Trace Norton MD   4 mg at 02/21/21 0224    magnesium hydroxide (MILK OF MAGNESIA) 400 MG/5ML suspension 30 mL  30 mL Oral Daily PRN Trace Norton MD        insulin lispro (HUMALOG) injection vial 0-6 Units  0-6 Units Subcutaneous TID WC Trace Norton MD        insulin lispro (HUMALOG) injection vial 0-3 Units  0-3 Units Subcutaneous Nightly Trace Norton MD   1 Units at 02/22/21 2242    heparin (porcine) injection 5,000 Units  5,000 Units Subcutaneous 3 times per day Trace Norton MD   5,000 Units at 02/23/21 0501    sodium chloride flush 0.9 % injection 10 mL  10 mL Intravenous 2 times per day Trace Norton MD   10 mL at 02/22/21 2232    sodium chloride flush 0.9 % injection 10 mL  10 mL Intravenous PRN Trace Norton MD             dextrose 50 mL/hr (02/20/21 0054)      famotidine  10 mg Oral Daily    darbepoetin angie-polysorbate  60 mcg Subcutaneous Weekly    meropenem (MERREM) 500 mg in SWFI 10 mL IV syringe  500 mg Intravenous Q24H    vancomycin (VANCOCIN) intermittent dosing (placeholder)   Other RX Placeholder    ipratropium-albuterol  1 ampule Inhalation Q4H WA    aspirin EC  81 mg Oral Daily    sevelamer  800 mg Oral Daily    insulin lispro  0-6 Units Subcutaneous TID     insulin lispro  0-3 Units Subcutaneous Nightly    heparin (porcine)  5,000 Units Subcutaneous 3 times per day    sodium chloride flush  10 mL Intravenous 2 times per day     oxyCODONE-acetaminophen, morphine, albuterol, glucose, dextrose, glucagon (rDNA), dextrose, potassium chloride **OR** potassium alternative oral replacement **OR** potassium chloride, promethazine **OR** ondansetron, magnesium hydroxide, sodium chloride flush  DIET CARB CONTROL;  Renal       Labs:   CBC with DIFF:  Recent Labs     02/21/21 0133 02/22/21 0219 02/23/21  0151   WBC 4.3* 3.6* 4.3*   RBC 3.43* 3.54* 3.50*   HGB 10.8* 10.9* 10.7*   HCT 34.5* 35.9* 35.0*   .6* 101.4* 100.0*   MCH 31.5* 30.8 30.6   MCHC 31.3* 30.4* 30.6*   RDW 17.9* 17.9* 17.7*   * 119* 133   MPV 10.5 10.2 10.4   NEUTOPHILPCT 69.9* 68.4* 70.4*   LYMPHOPCT 13.4* 14.0* 11.4*   MONOPCT 12.0* 14.0* 13.3*   EOSRELPCT 3.5 3.0 4.0   BASOPCT 0.7 0.3 0.2   NEUTROABS 3.0 2.5 3.0   LYMPHSABS 0.6* 0.5* 0.5*   MONOSABS 0.50 0.50 0.60   EOSABS 0.20 0.10 0.20   BASOSABS 0.00 0.00 0.00       CMP/BMP:  Recent Labs     02/21/21 0133 02/22/21 0219 02/23/21 0151    140 139   K 3.5 4.3  4.3 4.3   CL 94* 96* 93*   CO2 26 25 24   ANIONGAP 19 19 22*   GLUCOSE 137* 88 130*   BUN 22 33* 41*   CREATININE 3.3* 4.3* 5.5*   LABGLOM 14* 10* 8*   CALCIUM 9.3 10.0 9.7   PROT 7.1 7.3  --    LABALBU 4.4 4.6  --    BILITOT 0.5 0.6  --    ALKPHOS 297* 297*  --    ALT 37* 37*  --    AST 37* 42*  --          CRP:    No results for input(s): CRP in the last 72 hours. Sed Rate:    No results for input(s): SEDRATE in the last 72 hours. HgBA1c:  No components found for: HGBA1C  FLP:    Lab Results   Component Value Date    TRIG 65 02/19/2021    HDL 61 06/09/2020    LDLCALC 25 06/09/2020    LDLDIRECT 120 07/28/2015     TSH:  No results found for: TSH  Troponin T: No results for input(s): TROPONINI in the last 72 hours. Pro-BNP: No results for input(s): BNP in the last 72 hours. INR:   No results for input(s): INR in the last 72 hours. ABGs:   Lab Results   Component Value Date    PHART 7.340 02/19/2021    PO2ART 72.0 02/19/2021    SFX7QOY 46.0 02/19/2021     UA:No results for input(s): NITRITE, COLORU, PHUR, LABCAST, WBCUA, RBCUA, MUCUS, TRICHOMONAS, YEAST, BACTERIA, CLARITYU, SPECGRAV, LEUKOCYTESUR, UROBILINOGEN, BILIRUBINUR, BLOODU, GLUCOSEU, AMORPHOUS in the last 72 hours. Invalid input(s): Twila Libman      Culture Results:    No results for input(s): CXSURG in the last 720 hours. Blood Culture Recent:   Recent Labs     02/19/21  1154   BC No Growth to date. Any change in status will be called. Cultures:   No results for input(s): CULTURE in the last 72 hours. No results for input(s): BC, Xander Anthonyck in the last 72 hours. No results for input(s): CXSURG in the last 72 hours. Recent Labs     02/21/21  0133   MG 2.3     Recent Labs     02/21/21  0133 02/22/21  0219   AST 37* 42*   ALT 37* 37*   BILITOT 0.5 0.6   ALKPHOS 297* 297*         RAD:   Ct Head Wo Contrast    Result Date: 2/19/2021  Examination. CT HEAD WO CONTRAST 2/19/2021 12:55 PM History: Altered mental status. DLP: 1305 mGycm. The CT scan of the head is performed without intravenous contrast enhancement. The images are acquired in axial plane and subsequent reconstruction in coronal and sagittal planes. The comparison is made with the previous study dated 9/1/2020. There is no evidence of mass. No midline shift. There is no evidence of an intracranial hemorrhage or hematoma. Moderately dilated ventricles, basal cistern and the cortical sulci are similar to the previous study suggesting a chronic volume loss. A few scattered areas of chronic white matter ischemia in the centrum semiovale bilaterally are stable. The gray-white matter differentiation is maintained. A partially empty sella turcica is seen. The images reviewed in bone window show no acute bony abnormality. The visualized paranasal sinuses and mastoid air cells are clear. A curvilinear metallic density in the lateral margin of the left globe/orbit is similar to the previous study. No acute intracranial abnormality.  Signed by Dr Kady Lambert on 2/19/2021 2:38 PM    Ct Chest W Contrast    Result Date: 2/19/2021 EXAM: CT CHEST W CONTRAST -- 2/19/2021 12:55 PM HISTORY: 56 years, Female, abnormal chest x-ray COMPARISON: 2/19/2021 DLP: 769.5 mGy cm. Automated exposure control was utilized to minimize patient radiation dose. TECHNIQUE: Enhanced  CT images of the chest obtained with multiplanar reformats. FINDINGS: AIRWAYS/PULMONARY PARENCHYMA: The central airways are midline and patent. Expiratory phase of imaging. Small right pleural effusion with compressive enhancing breast atelectasis involving the right middle and lower lobes. Right upper lobe with a pleural-based 1.7 x 2.0 cm opacity, which could represent round atelectasis, scarring or nodule. VASCULATURE: Thoracic aorta is normal in course and caliber. Mild calcified aortic atherosclerosis. Normal pulmonary artery caliber. No large central pulmonary artery filling defect on this non-CTA exam. Left subclavian vein with short segment severe stenosis on axial image 29. This is just lateral to the stent. There are left chest wall collaterals suggesting that this is a chronic finding. CARDIAC:  Borderline heart size. No pericardial effusion. Scattered coronary artery calcifications. MEDIASTINUM: There is no mediastinal or hilar lymphadenopathy by CT size criteria. Esophagus has normal coarse, caliber and wall thickness. EXTRATHORACIC: The visualized portions of the thyroid gland are unremarkable. No thoracic inlet or axillary adenopathy. Body wall edema. Left chest wall collaterals. INCLUDED UPPER ABDOMEN: The liver appears prominent low density, which could be due to fatty infiltration or phase of contrast. Cholecystectomy clips. No bile duct dilation. Small ascites. There is wedge-shaped low-density in the spleen, concerning for age-indeterminate splenic infarct. Visualized portion of the pancreas and adrenal glands appear within normal limits. Atrophic kidneys partially visualized. OSSEOUS: Mild degenerative changes in the spine. No acute or aggressive bony finding. 1. Right pleural effusion with compressive atelectasis of the right middle and lower lobes. 2. Right upper lobe with a pleural-based 1.7 x 2.0 cm opacity, which could represent round atelectasis, scarring or nodule. Recommend follow-up CT chest in 3 months per Fleischner criteria. 3. Left subclavian vein with a short segment severe stenosis, just lateral to the stent. Left chest wall collaterals suggest chronicity. 4. Borderline heart size with scattered coronary artery calcifications. 5. Wedge-shaped low-density in the spleen concerning for age-indeterminate splenic infarct. 6. Low-density of the liver which could be seen with fatty infiltration or exaggerated due to phase of contrast. Small volume ascites.  Signed by Dr Osmin Carpenter on 2/19/2021 2:42 PM    Ct Abdomen Pelvis W Iv Contrast Additional Contrast? None    Result Date: 2/19/2021 EXAM: CT ABDOMEN PELVIS W IV CONTRAST -- 2/19/2021 12:55 PM HISTORY: 56 years, Female, markedly elevated lipase, abdominal pain, diarrhea, ESRD on hemodialysis COMPARISON: No existing relevant imaging studies available DLP: 769.5 mGy cm. Automated exposure control was utilized to minimize patient radiation dose. TECHNIQUE: Enhanced CT images obtained of the abdomen and pelvis with multiplanar reformats. FINDINGS: LUNG BASES: Right pleural effusion with compressive atelectasis. Underlying inferior heart size with scattered coronary artery calcifications. LIVER: Diffuse low-density of the liver concerning for fatty liver. Liver appears mildly enlarged. Patent portal and hepatic veins. GALLBLADDER and BILARY: Cholecystectomy clips. No bile duct dilation. PANCREAS: Normal enhancement of the pancreas. There is ascites and mesenteric edema throughout the abdomen, which limited evaluation of the peripancreatic fat. SPLEEN: There is a wedge-shaped hypodensity in the spleen concerning for infarct. Splenic vein appears grossly patent. Normal caliber splenic artery. ADRENALS: No adrenal mass. URINARY: Atrophic kidneys. No hydronephrosis. Renal vascular calcifications. No convincing nephrolithiasis. Urinary bladder is underdistended, which limits evaluation. Grossly normal CT appearance of the uterus. Adnexa are not discretely identified. PERITONEUM: No ascites/free fluid. No pneumoperitoneum. BOWEL: Stomach and duodenum have normal course and caliber. No abnormally dilated loops of bowel or bowel wall thickening. Normal short appendix or appendiceal stump on axial images 61-57. RETROPERITONEUM:  Aorta normal in course and caliber with calcified atherosclerosis. Celiac and superior mesenteric arteries patent. Bilateral renal arteries diminutive. Inferior mesenteric artery opacified.  Heavily calcified iliac arteries, limited in evaluation on this non-CTA exam. Scattered retroperitoneal lymph nodes, not enlarged by CT size  Likely Severe (3-4+) mitral regurgitation is present. No stenosis.   Mild tricuspid regurgitation.  RVSP estimated at 50 mmHg.      Signature    ----------------------------------------------------------------   Electronically signed by Ashley Oliver MD(Interpreting physician)   on 05/08/2020 07:30 PM   ----------------------------------------------------------------        Objective:   Vitals:   BP (!) 154/76   Pulse 77   Temp 98.6 °F (37 °C)   Resp 18   Wt 145 lb 6 oz (65.9 kg)   SpO2 95%   BMI 29.36 kg/m²     Patient Vitals for the past 24 hrs:   BP Temp Temp src Pulse Resp SpO2 Weight   02/23/21 0643      95 %    02/23/21 0600 (!) 154/76 98.6 °F (37 °C)  77 18 96 %    02/23/21 0013 (!) 170/77 97.8 °F (36.6 °C) Temporal 85 18 93 %    02/22/21 1902 (!) 154/76 97.3 °F (36.3 °C) Temporal 85 20 93 % 145 lb 6 oz (65.9 kg)   02/22/21 1200 133/71 97.6 °F (36.4 °C)  70 18 91 %        24HR INTAKE/OUTPUT:      Intake/Output Summary (Last 24 hours) at 2/23/2021 0759  Last data filed at 2/22/2021 1902  Gross per 24 hour   Intake 290 ml   Output    Net 290 ml       Physical Exam  Vitals signs and nursing note reviewed. Constitutional:       General: She is not in acute distress. Appearance: Normal appearance. She is ill-appearing. She is not toxic-appearing or diaphoretic. HENT:      Head: Normocephalic and atraumatic. Right Ear: External ear normal.      Left Ear: External ear normal.      Nose: Nose normal. No congestion or rhinorrhea. Eyes:      General: No scleral icterus. Right eye: No discharge. Left eye: No discharge. Extraocular Movements: Extraocular movements intact. Conjunctiva/sclera: Conjunctivae normal.   Neck:      Musculoskeletal: Normal range of motion and neck supple. No neck rigidity or muscular tenderness. Vascular: No carotid bruit. Cardiovascular:      Rate and Rhythm: Normal rate and regular rhythm. Pulses: Normal pulses. Heart sounds: Normal heart sounds. No friction rub. No gallop. Pulmonary:      Effort: Pulmonary effort is normal. No respiratory distress. Breath sounds: Normal breath sounds. No stridor. No wheezing, rhonchi or rales. Comments: Diminished breath sounds bilaterally  Chest:      Chest wall: No tenderness. Abdominal:      General: Bowel sounds are normal. There is no distension. Palpations: Abdomen is soft. Tenderness: There is abdominal tenderness. There is no guarding or rebound. Comments: Diffuse tenderness to palpation. No guarding, rigidity, rebound tenderness noted/elicited   Musculoskeletal: Normal range of motion. General: No swelling, tenderness, deformity or signs of injury. Right lower leg: Edema present. Left lower leg: Edema present. Skin:     General: Skin is warm and dry. Capillary Refill: Capillary refill takes less than 2 seconds. Coloration: Skin is not jaundiced or pale. Findings: No bruising, erythema, lesion or rash. Neurological:      General: No focal deficit present. Mental Status: She is alert. She is disoriented. Cranial Nerves: No cranial nerve deficit. Psychiatric:         Mood and Affect: Mood normal.      Comments: Unable to assess judgment and thought content.            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  Replacing Inactive Diagnoses         HTN (hypertension) 2/19/2021 Yes    ESRD (end stage renal disease) on dialysis ([address] 2/19/2021 Yes    Acute diarrhea 2/19/2021 Yes          Active Problems: Elevated lipase    Bilateral pubic rami fractures, closed, initial encounter ([facility]    Type 2 diabetes mellitus with ophthalmic complication, with long-term current use of insulin ([facility])    HTN (hypertension)    ESRD (end stage renal disease) on dialysis ([facility]    Acute diarrhea  Resolved Problems:    * No resolved hospital problems. *        Brief Summary  Ms. Zeinab De La Torre a 56 y.o. female with a history of DMT2, ESRD, on HD, CHF, presenting to [address] [place] [address] ED (2021) on account of diarrhea, with an associated abdominal/back pain, generalized weakness and lethargy.     Patient reportedly has missed 1 session of HD, due to the symptoms noted above.     Patient reportedly is on baseline home oxygen.     Initial work-up significant for;  AB.3 / 4.8  Hyperkalemia, with a potassium of 5.3  Initial BUN/creatinine of 77/7.2  Hypoglycemia with initial glucose of 67  Leukopenia, with WBC of 3.7  Lactic acid within lab reference range: 0.8  Markedly elevated lipase: 1,492  Mildly elevated ESR: 30 mm/hr  Elevated CRP: 7.45  Molecular respiratory panel with COVID-19 grossly negative  Patient admitted for further work-up and management.     Generalized weakness  Diarrhea  Altered mental status  Transaminitis  · Initial work-up (2021) significant for;  · AB.3 // 4.8  · Leukopenia, with WBC of 3.7  · Lactic acid within lab reference range: 0.8  · Markedly elevated lipase: 1,492 (2021)  · Mildly elevated ESR: 30 mm/hr  · Elevated CRP: 7.45  · Mildly elevated ALT/AST: 55/54  · Elevated alkaline phosphatase: 353  · Molecular respiratory panel with COVID-19 grossly negative -2021  · Lipase level improving  · Blood cultures no growth to date  · Mildly elevated Procalcitonin level: 0.24 (2021)  · Elevated CRP: 7.45 [0.00 - 0.50 mg/dL]  · Mildly elevated ESR  · Elevated GGT  · Elevated serum ferritin  · Triglyceride level within lab reference range  · Ammonia level · Abdominal complete (02/21/2021): Impression: Liver does not appear fatty by ultrasound. Low density appearance on prior CT may have been related to phase of contrast. Cholecystectomy. Common bile duct measures up to 1 cm, similar compared to 2/19/2021. This can be seen as reservoir effect after cholecystectomy. Bilateral renal atrophy. Right pleural effusion. Small volume ascites  · GI following recommendations much appreciated       Subacute to chronic pubic rami fractures. · Continue supportive symptomatic management including pain management as needed  · Seen by Orthopedic surgeryappreciate recommendations  · Continue PT OT      History of ESRD, on scheduled HD Tues_Thur_Sat  · Hyperkalemia, resolved  · Initial BUN/creatinine of 77/7.2  · Reportedly missed 1 session of HD (02/18/2021), prior to presentation  · Nephrology followingappreciate recommendations  · Continue scheduled HD  · Continue management as per nephrology rec        History of insulin-dependent Diabetes Mellitus II  · Hypoglycemic on presentation  · Hemoglobin A1C: 6.7% (02/19/2021)  · Inpatient Regimens to include;  ? -Hold basal Insulin Glargine (Lantus), for now, given concern for hypoglycemia  ? - Insulin Lispro (Humalog) on a Low dose sliding scale  · Monitor POC glucose, and adjust insulin regimen accordingly based on daily insulin requirement.         Continue management of other chronic medical conditions - see above and orders. Advance Directive: Full Code    DIET CARB CONTROL; Renal         Consults Made:   IP CONSULT TO NEPHROLOGY  IP CONSULT TO NEPHROLOGY  IP CONSULT TO SOCIAL WORK  IP CONSULT TO GI  PHARMACY TO DOSE VANCOMYCIN  IP CONSULT TO ORTHOPEDIC SURGERY    DVT prophylaxis: Heparin      Discharge planning:   Mental status improving  Continue current management and work-up as noted above. Time Spent is 25 mins in the examination, evaluation, counseling and review of medications, assessment and plan.

## 2021-02-23 NOTE — PROGRESS NOTES
Nephrology ([facility]) Progress Note    Patient:  Jonathan Braden  YOB: 1960  Date of Service: 2/23/2021  MRN: [id number]   Acct: [de-identified]   Primary Care Physician: RAMONA Garcia  Advance Directive: Full Code  Admit Date: 2/19/2021       Hospital Day: 4  Referring Provider: Temo Grady MD    Patient Seen, Chart, Consults, Notes, Labs, Radiology studies reviewed. Subjective:  Jonathan Braden is a 56 y.o. female  whom we were consulted for end-stage renal disease. Patient goes to [facility] dialysis clinic on Tuesday Thursday Saturday however she has skipped treatment on Thursday afternoon. She presented to emergency room on Friday morning with increasing weakness, shortness of breath. She also reports some diarrhea. Incidental finding of the labs shows potassium was 6.5 mmol. She was also clinically volume overload. She has received emergent dialysis treatment on Friday. She has received routine hemodialysis treatment on 2/20.     Seen and examined on dialysis. Patient appeared to be more awake and less confused.     Dialysis   Pt was seen on RRT  Modality: Hemodialysis  Access: Arterial Venous Fistula  Location: left upper  QB: 450  QD: 700  UF: 3 Liters    Allergies:  Bupropion, Gabapentin, Sulfa antibiotics, Varenicline, Wellbutrin [bupropion hcl], Azithromycin, Levaquin [levofloxacin], and Penicillins    Medicines:  Current Facility-Administered Medications   Medication Dose Route Frequency Provider Last Rate Last Admin    famotidine (PEPCID) tablet 10 mg  10 mg Oral Daily Temo Grady MD   10 mg at 02/22/21 1555    darbepoetin angie-polysorbate (ARANESP) injection 60 mcg  60 mcg Subcutaneous Weekly Juno Gil MD   60 mcg at 02/20/21 1247    meropenem (MERREM) 500 mg in sterile water 10 mL IV syringe  500 mg Intravenous Q24H Temo Grady MD   500 mg at 02/22/21 1082 The fax number is [phone]   vancomycin (VANCOCIN) intermittent dosing (placeholder)   Other RX Placeholder Jihan Lubin MD        oxyCODONE-acetaminophen (PERCOCET) 5-325 MG per tablet 1 tablet  1 tablet Oral Q6H PRN Jihan Lubin MD   1 tablet at 02/20/21 1648    morphine injection 1 mg  1 mg Intravenous Q4H PRN Jihan Lubin MD   1 mg at 02/21/21 2209    ipratropium-albuterol (Karlo Tran) nebulizer solution 1 ampule  1 ampule Inhalation Q4H WA Jihan Lubin MD   1 ampule at 02/23/21 9295    aspirin EC tablet 81 mg  81 mg Oral Daily Jihan Lubin MD   81 mg at 02/22/21 1555    albuterol (PROVENTIL) nebulizer solution 2.5 mg  2.5 mg Nebulization Q6H PRN Jihan Lubin MD        sevelamer (RENVELA) tablet 800 mg  800 mg Oral Daily Jihan Lubin MD        glucose (GLUTOSE) 40 % oral gel 15 g  15 g Oral PRN Jihan Lubin MD        dextrose 50 % IV solution  12.5 g Intravenous PRN Jihan Lubin MD   12.5 g at 02/19/21 2110    glucagon (rDNA) injection 1 mg  1 mg Intramuscular PRN Jihan Lubin MD        dextrose 5 % solution  100 mL/hr Intravenous PRN Jihan Lubin MD 50 mL/hr at 02/20/21 0054 50 mL/hr at 02/20/21 0054    potassium chloride (KLOR-CON M) extended release tablet 40 mEq  40 mEq Oral PRN Jihan Lubin MD        Or    potassium bicarb-citric acid (EFFER-K) effervescent tablet 40 mEq  40 mEq Oral PRN Jihan Lubin MD        Or    potassium chloride 10 mEq/100 mL IVPB (Peripheral Line)  10 mEq Intravenous PRN Jihan Lubin MD        promethazine (PHENERGAN) tablet 12.5 mg  12.5 mg Oral Q6H PRN Jihan Lubin MD        Or    ondansetron [facility]) injection 4 mg  4 mg Intravenous Q6H PRN Jihan Lubin MD   4 mg at 02/21/21 0224    magnesium hydroxide (MILK OF MAGNESIA) 400 MG/5ML suspension 30 mL  30 mL Oral Daily PRN Jihan Lubin MD  insulin lispro (HUMALOG) injection vial 0-6 Units  0-6 Units Subcutaneous TID WC Rachel Ching MD        insulin lispro (HUMALOG) injection vial 0-3 Units  0-3 Units Subcutaneous Nightly Rachel Ching MD   1 Units at 02/22/21 2242    heparin (porcine) injection 5,000 Units  5,000 Units Subcutaneous 3 times per day Rachel Ching MD   5,000 Units at 02/23/21 0501    sodium chloride flush 0.9 % injection 10 mL  10 mL Intravenous 2 times per day Rachel Ching MD   10 mL at 02/22/21 2232    sodium chloride flush 0.9 % injection 10 mL  10 mL Intravenous PRN Rachel Ching MD           Past Medical History:  Past Medical History:   Diagnosis Date    Blind right eye     partial vision loss in the L eye too, due to DM    Blindness of one eye     Right     [facility]est[facility]     CKD (chronic kidney disease), stage IV (HCC)     secondary to diabetic nephropathy    Diabetes ([facility] [address]     Diabetes mellitus with ophthalmic manifestations     dx'd in 2000 but likely ongoing before that, was dx'd when she went blind in the R eye    Glaucoma     Hemodialysis patient ([facility] [address]     dialysis tues, thurs, sat. [facility], [facility]    Hypertension     2000, dx'd at same time as the DM    Obesity     Renal osteodystrophy     Smoker     Type II or unspecified type diabetes mellitus with renal manifestations, uncontrolled(250.42)        Past Surgical History:  Past Surgical History:   Procedure Laterality Date    CARPAL TUNNEL RELEASE      CATARACT REMOVAL     [address], and 1979    CHOLECYSTECTOMY      COLONOSCOPY N/A 3/9/2018    Dr ReevesWtirir-Oxhhmpibmvvvxv-Yhrhladtoddwy AP (-) dysplasia x 1, tubular AP x  (-) dysplasia x 1, HP x 4--1 yr recall    DIALYSIS FISTULA CREATION Left 4/23/15 [facility]    Left proximal ulnar artery to cephalic vein AVF creation    EYE SURGERY      laser, several times     TYMPANOSTOMY TUBE PLACEMENT Bilateral  VASCULAR SURGERY Left 5/11/15 [facility]    US-guided cannulation left distal upper arm cephalic vein with 4-Palauan glide sheath; LUE AV f'grams with venography SVC; left cephalic vein BAM with [id number] julieth balloon; completion venogram [facility]    VASCULAR SURGERY Left 5/28/15 [facility]    Percutaneous cannulation left distal radial artery with 4-Palauan glide sheath; LUE AV f'grams with venography SVC; left cephalic vein balloon a'plasty with [id number] julieth balloon and [id number] julieth balloon; proximal and mid upper arm cephalic vein BAM with [id number] julieth balloon; completion venogram [facility]    VASCULAR SURGERY Left 6/15/15 [facility]    US-guided cannulation left cephalic vein with 4-Palauan and later 7-Palauan sheath; LUE AV f'grams including venography SVC; left cephalic vein stenosis balloon a'plasty with [id number] cutting balloon; completion f'gram and venogram [facility]    VASCULAR SURGERY  05/15/2017    [facility]. Left upper fistulograms/venograms.  VASCULAR SURGERY  02/14/2018    [facility]. Left upper fistulograms, left subclavian BA 12x40 atlas, left cephalic arch BA [id number] conquest.    VASCULAR SURGERY  02/13/2019    [facility]. Left upper fistulograms,BA left SCV 8x40 conquest,stent left SCV, viabahn 13x50,left SCV stent BA 12x40 conquest.       Family History  Family History   Problem Relation Age of Onset    Heart Disease Mother     Heart Attack Mother     Glaucoma Sister     Diabetes Sister     Diabetes Brother     Kidney Disease Brother     Blindness Brother     Stroke Maternal Grandmother     Stroke Maternal Grandfather     Colon Cancer Neg Hx     Colon Polyps Neg Hx     Liver Cancer Neg Hx     Liver Disease Neg Hx     Esophageal Cancer Neg Hx     Rectal Cancer Neg Hx     Stomach Cancer Neg Hx        Social History  Social History     Socioeconomic History    Marital status:      Spouse name: Not on file    Number of children: Not on file    Years of education: Not on file  Highest education level: Not on file   Occupational History    Not on file   Social Needs    Financial resource strain: Not on file    Food insecurity     Worry: Not on file     Inability: Not on file    Transportation needs     Medical: Not on file     Non-medical: Not on file   Tobacco Use    Smoking status: Current Every Day Smoker     Packs/day: 1.00     Years: 40.00     Pack years: 40.00    Smokeless tobacco: Never Used   Substance and Sexual Activity    Alcohol use: No     Alcohol/week: 0.0 standard drinks    Drug use: Yes     Types: Marijuana    Sexual activity: Not Currently     Partners: Male   Lifestyle    Physical activity     Days per week: Not on file     Minutes per session: Not on file    Stress: Not on file   Relationships    Social connections     Talks on phone: Not on file     Gets together: Not on file     Attends Jew service: Not on file     Active member of club or organization: Not on file     Attends meetings of clubs or organizations: Not on file     Relationship status: Not on file    Intimate partner violence     Fear of current or ex partner: Not on file     Emotionally abused: Not on file     Physically abused: Not on file     Forced sexual activity: Not on file   Other Topics Concern    Not on file   Social History Narrative    Not on file         Review of Systems:  Reviewed with the patient at the bedside, 6 points reviewed and negative except as noted above.       Objective:  BP:160/65  Hr :80    General: awake/alert   Chest:  clear to auscultation bilaterally without respiratory distress  CVS: regular rate and rhythm  Abdominal: soft, nontender, normal bowel sounds  Extremities: no cyanosis or edema  Skin: warm and dry without rash    Labs:  BMP:   Recent Labs     02/21/21  0133 02/22/21  0219 02/23/21  0151    140 139   K 3.5 4.3  4.3 4.3   CL 94* 96* 93*   CO2 26 25 24   BUN 22 33* 41*   CREATININE 3.3* 4.3* 5.5*   CALCIUM 9.3 10.0 9.7     CBC: Recent Labs     02/21/21 0133 02/22/21 0219 02/23/21  0151   WBC 4.3* 3.6* 4.3*   HGB 10.8* 10.9* 10.7*   HCT 34.5* 35.9* 35.0*   .6* 101.4* 100.0*   * 119* 133     LIVER PROFILE:   Recent Labs     02/21/21 0133 02/22/21 0219   AST 37* 42*   ALT 37* 37*   BILITOT 0.5 0.6   ALKPHOS 297* 297*     PT/INR: No results for input(s): PROTIME, INR in the last 72 hours. APTT: No results for input(s): APTT in the last 72 hours. BNP:  No results for input(s): BNP in the last 72 hours. Ionized Calcium:No results for input(s): IONCA in the last 72 hours. Magnesium:  Recent Labs     02/21/21 0133   MG 2.3     Phosphorus:No results for input(s): PHOS in the last 72 hours. HgbA1C: No results for input(s): LABA1C in the last 72 hours. Hepatic:   Recent Labs     02/21/21 0133 02/22/21 0219   ALKPHOS 297* 297*   ALT 37* 37*   AST 37* 42*   PROT 7.1 7.3   BILITOT 0.5 0.6   LABALBU 4.4 4.6     Lactic Acid: No results for input(s): LACTA in the last 72 hours. Troponin: No results for input(s): CKTOTAL, CKMB, TROPONINT in the last 72 hours. ABGs: No results for input(s): PH, PCO2, PO2, HCO3, O2SAT in the last 72 hours. CRP:  No results for input(s): CRP in the last 72 hours. Sed Rate:  No results for input(s): SEDRATE in the last 72 hours. Cultures:   No results for input(s): CULTURE in the last 72 hours. No results for input(s): BC, Benny Mouse in the last 72 hours. No results for input(s): CXSURG in the last 72 hours.     Radiology reports as per the Radiologist  Radiology: Ct Head Wo Contrast    Result Date: 2/19/2021 Examination. CT HEAD WO CONTRAST 2/19/2021 12:55 PM History: Altered mental status. DLP: 1305 mGycm. The CT scan of the head is performed without intravenous contrast enhancement. The images are acquired in axial plane and subsequent reconstruction in coronal and sagittal planes. The comparison is made with the previous study dated 9/1/2020. There is no evidence of mass. No midline shift. There is no evidence of an intracranial hemorrhage or hematoma. Moderately dilated ventricles, basal cistern and the cortical sulci are similar to the previous study suggesting a chronic volume loss. A few scattered areas of chronic white matter ischemia in the centrum semiovale bilaterally are stable. The gray-white matter differentiation is maintained. A partially empty sella turcica is seen. The images reviewed in bone window show no acute bony abnormality. The visualized paranasal sinuses and mastoid air cells are clear. A curvilinear metallic density in the lateral margin of the left globe/orbit is similar to the previous study. No acute intracranial abnormality.  Signed by Dr Hays Meals on 2/19/2021 2:38 PM    Ct Chest W Contrast    Result Date: 2/19/2021 EXAM: CT CHEST W CONTRAST -- 2/19/2021 12:55 PM HISTORY: 56 years, Female, abnormal chest x-ray COMPARISON: 2/19/2021 DLP: 769.5 mGy cm. Automated exposure control was utilized to minimize patient radiation dose. TECHNIQUE: Enhanced  CT images of the chest obtained with multiplanar reformats. FINDINGS: AIRWAYS/PULMONARY PARENCHYMA: The central airways are midline and patent. Expiratory phase of imaging. Small right pleural effusion with compressive enhancing breast atelectasis involving the right middle and lower lobes. Right upper lobe with a pleural-based 1.7 x 2.0 cm opacity, which could represent round atelectasis, scarring or nodule. VASCULATURE: Thoracic aorta is normal in course and caliber. Mild calcified aortic atherosclerosis. Normal pulmonary artery caliber. No large central pulmonary artery filling defect on this non-CTA exam. Left subclavian vein with short segment severe stenosis on axial image 29. This is just lateral to the stent. There are left chest wall collaterals suggesting that this is a chronic finding. CARDIAC:  Borderline heart size. No pericardial effusion. Scattered coronary artery calcifications. MEDIASTINUM: There is no mediastinal or hilar lymphadenopathy by CT size criteria. Esophagus has normal coarse, caliber and wall thickness. EXTRATHORACIC: The visualized portions of the thyroid gland are unremarkable. No thoracic inlet or axillary adenopathy. Body wall edema. Left chest wall collaterals. INCLUDED UPPER ABDOMEN: The liver appears prominent low density, which could be due to fatty infiltration or phase of contrast. Cholecystectomy clips. No bile duct dilation. Small ascites. There is wedge-shaped low-density in the spleen, concerning for age-indeterminate splenic infarct. Visualized portion of the pancreas and adrenal glands appear within normal limits. Atrophic kidneys partially visualized. OSSEOUS: Mild degenerative changes in the spine. No acute or aggressive bony finding. 1. Right pleural effusion with compressive atelectasis of the right middle and lower lobes. 2. Right upper lobe with a pleural-based 1.7 x 2.0 cm opacity, which could represent round atelectasis, scarring or nodule. Recommend follow-up CT chest in 3 months per Fleischner criteria. 3. Left subclavian vein with a short segment severe stenosis, just lateral to the stent. Left chest wall collaterals suggest chronicity. 4. Borderline heart size with scattered coronary artery calcifications. 5. Wedge-shaped low-density in the spleen concerning for age-indeterminate splenic infarct. 6. Low-density of the liver which could be seen with fatty infiltration or exaggerated due to phase of contrast. Small volume ascites.  Signed by Dr Mandi Gomez on 2/19/2021 2:42 PM    Ct Abdomen Pelvis W Iv Contrast Additional Contrast? None    Result Date: 2/19/2021 EXAM: CT ABDOMEN PELVIS W IV CONTRAST -- 2/19/2021 12:55 PM HISTORY: 56 years, Female, markedly elevated lipase, abdominal pain, diarrhea, ESRD on hemodialysis COMPARISON: No existing relevant imaging studies available DLP: 769.5 mGy cm. Automated exposure control was utilized to minimize patient radiation dose. TECHNIQUE: Enhanced CT images obtained of the abdomen and pelvis with multiplanar reformats. FINDINGS: LUNG BASES: Right pleural effusion with compressive atelectasis. Underlying inferior heart size with scattered coronary artery calcifications. LIVER: Diffuse low-density of the liver concerning for fatty liver. Liver appears mildly enlarged. Patent portal and hepatic veins. GALLBLADDER and BILARY: Cholecystectomy clips. No bile duct dilation. PANCREAS: Normal enhancement of the pancreas. There is ascites and mesenteric edema throughout the abdomen, which limited evaluation of the peripancreatic fat. SPLEEN: There is a wedge-shaped hypodensity in the spleen concerning for infarct. Splenic vein appears grossly patent. Normal caliber splenic artery. ADRENALS: No adrenal mass. URINARY: Atrophic kidneys. No hydronephrosis. Renal vascular calcifications. No convincing nephrolithiasis. Urinary bladder is underdistended, which limits evaluation. Grossly normal CT appearance of the uterus. Adnexa are not discretely identified. PERITONEUM: No ascites/free fluid. No pneumoperitoneum. BOWEL: Stomach and duodenum have normal course and caliber. No abnormally dilated loops of bowel or bowel wall thickening. Normal short appendix or appendiceal stump on axial images 61-57. RETROPERITONEUM:  Aorta normal in course and caliber with calcified atherosclerosis. Celiac and superior mesenteric arteries patent. Bilateral renal arteries diminutive. Inferior mesenteric artery opacified.  Heavily calcified iliac arteries, limited in evaluation on this non-CTA exam. Scattered retroperitoneal lymph nodes, not enlarged by CT size criteria, which may be reactive. ABDOMINAL WALL: Significant body wall edema. Prominent vessel in the partially visualized left distal upper arm, possibly fistula. OSSEOUS: Subacute to chronic right superior pubic ramus, right inferior pubic ramus and left inferior pubic rami fractures. 1. Ascites and mesenteric edema Limited evaluation of the peripancreatic fat. Normal enhancement of the pancreas. 2. Wedge-shaped hypodensity in the spleen concerning for age-indeterminate infarct. Splenic vein and artery appear patent and normal caliber. 3. Enlarged low-density liver concerning for fatty liver. 4. Right pleural effusion, ascites, body wall edema suggests fluid overload. 5. Heavily calcified atherosclerosis. 6. Subacute to chronic appearing pubic rami fractures. 7. See separately dictated CT chest of the same day. Signed by Dr Gamaliel Seaman on 2/19/2021 2:54 PM    Xr Chest Portable    Result Date: 2/19/2021  XR CHEST PORTABLE 2/19/2021 10:18 AM HISTORY: Shortness of breath COMPARISON: Chest x-ray dated 10/10/2020. FINDINGS: Mild cardiomegaly which is stable. Bilateral coarsened interstitial markings with large layering right pleural effusion. No pneumothorax. Vascular stent along the left brachiocephalic vein. No acute bony abnormality. 1. Interstitial edema with large layering right pleural effusion. Signed by Dr Natanael Jensen on 2/19/2021 11:44 AM       Assessment   1. End-stage renal disease/maintenance dialysis. 2.  Type II diabetic nephropathy. 3.  Noncompliant dialysis patient. 4.  Anemia of chronic kidney disease. 5.  Hyperkalemia now resolved. 6.  Clinically volume overloaded  7. Right-sided pleural effusion/large. 8.  Metabolic encephalopathy  9. Pubic rami fractures/recent fall    Plan:  Dialysis as above, follow-up labs.     Kristina Crowder MD  02/23/21  10:37 AM

## 2021-02-23 NOTE — PROGRESS NOTES
Pt continues to pull out IVs. Attempted to get new access and failed. Pt jerks her arm away when trying.

## 2021-02-23 NOTE — PROGRESS NOTES
Pt has been more alert this shift. She ate a couple of bites= for each meal and was able to take her medications today. Her S/O was at bedside around supper time and assisted her with eating. She ate about 25-30% of her meal tray. She has been more calm today and has not been yelling out. She was able to answer questions appropriately and gets tearful when asked about going to rehab.

## 2021-02-24 ENCOUNTER — TELEPHONE (OUTPATIENT)
Dept: PRIMARY CARE CLINIC | Age: 61
End: 2021-02-24

## 2021-02-24 LAB
ANCA IFA: NORMAL
ANION GAP SERPL CALCULATED.3IONS-SCNC: 17 MMOL/L (ref 7–19)
BASOPHILS ABSOLUTE: 0 K/UL (ref 0–0.2)
BASOPHILS RELATIVE PERCENT: 0.3 % (ref 0–1)
BLOOD CULTURE, ROUTINE: NORMAL
BUN BLDV-MCNC: 22 MG/DL (ref 8–23)
CALCIUM SERPL-MCNC: 9.6 MG/DL (ref 8.8–10.2)
CERULOPLASMIN: 36 MG/DL (ref 16–45)
CHLORIDE BLD-SCNC: 94 MMOL/L (ref 98–111)
CO2: 26 MMOL/L (ref 22–29)
CREAT SERPL-MCNC: 3.6 MG/DL (ref 0.5–0.9)
CULTURE, BLOOD 2: NORMAL
EOSINOPHILS ABSOLUTE: 0.1 K/UL (ref 0–0.6)
EOSINOPHILS RELATIVE PERCENT: 2.9 % (ref 0–5)
GFR AFRICAN AMERICAN: 15
GFR NON-AFRICAN AMERICAN: 13
GLUCOSE BLD-MCNC: 115 MG/DL (ref 74–109)
GLUCOSE BLD-MCNC: 126 MG/DL (ref 70–99)
GLUCOSE BLD-MCNC: 130 MG/DL (ref 70–99)
GLUCOSE BLD-MCNC: 169 MG/DL (ref 70–99)
GLUCOSE BLD-MCNC: 192 MG/DL (ref 70–99)
HCT VFR BLD CALC: 33.9 % (ref 37–47)
HEMOGLOBIN: 10.9 G/DL (ref 12–16)
IMMATURE GRANULOCYTES #: 0 K/UL
LYMPHOCYTES ABSOLUTE: 0.5 K/UL (ref 1.1–4.5)
LYMPHOCYTES RELATIVE PERCENT: 13.1 % (ref 20–40)
MCH RBC QN AUTO: 31.3 PG (ref 27–31)
MCHC RBC AUTO-ENTMCNC: 32.2 G/DL (ref 33–37)
MCV RBC AUTO: 97.4 FL (ref 81–99)
MONOCYTES ABSOLUTE: 0.6 K/UL (ref 0–0.9)
MONOCYTES RELATIVE PERCENT: 15 % (ref 0–10)
NEUTROPHILS ABSOLUTE: 2.6 K/UL (ref 1.5–7.5)
NEUTROPHILS RELATIVE PERCENT: 68.2 % (ref 50–65)
PDW BLD-RTO: 17.1 % (ref 11.5–14.5)
PERFORMED ON: ABNORMAL
PHOSPHORUS: 3.5 MG/DL (ref 2.5–4.5)
PLATELET # BLD: 120 K/UL (ref 130–400)
PMV BLD AUTO: 9.7 FL (ref 9.4–12.3)
POTASSIUM REFLEX MAGNESIUM: 3.8 MMOL/L (ref 3.5–5)
RBC # BLD: 3.48 M/UL (ref 4.2–5.4)
SODIUM BLD-SCNC: 137 MMOL/L (ref 136–145)
WBC # BLD: 3.7 K/UL (ref 4.8–10.8)

## 2021-02-24 PROCEDURE — 97116 GAIT TRAINING THERAPY: CPT

## 2021-02-24 PROCEDURE — 2580000003 HC RX 258: Performed by: INTERNAL MEDICINE

## 2021-02-24 PROCEDURE — 84100 ASSAY OF PHOSPHORUS: CPT

## 2021-02-24 PROCEDURE — 97530 THERAPEUTIC ACTIVITIES: CPT

## 2021-02-24 PROCEDURE — 94640 AIRWAY INHALATION TREATMENT: CPT

## 2021-02-24 PROCEDURE — 85025 COMPLETE CBC W/AUTO DIFF WBC: CPT

## 2021-02-24 PROCEDURE — 80048 BASIC METABOLIC PNL TOTAL CA: CPT

## 2021-02-24 PROCEDURE — 1210000000 HC MED SURG R&B

## 2021-02-24 PROCEDURE — 6370000000 HC RX 637 (ALT 250 FOR IP): Performed by: INTERNAL MEDICINE

## 2021-02-24 PROCEDURE — 82947 ASSAY GLUCOSE BLOOD QUANT: CPT

## 2021-02-24 PROCEDURE — 6360000002 HC RX W HCPCS: Performed by: INTERNAL MEDICINE

## 2021-02-24 PROCEDURE — 36415 COLL VENOUS BLD VENIPUNCTURE: CPT

## 2021-02-24 RX ADMIN — HEPARIN SODIUM 5000 UNITS: 5000 INJECTION INTRAVENOUS; SUBCUTANEOUS at 06:11

## 2021-02-24 RX ADMIN — HEPARIN SODIUM 5000 UNITS: 5000 INJECTION INTRAVENOUS; SUBCUTANEOUS at 21:55

## 2021-02-24 RX ADMIN — HEPARIN SODIUM 5000 UNITS: 5000 INJECTION INTRAVENOUS; SUBCUTANEOUS at 15:41

## 2021-02-24 RX ADMIN — SEVELAMER CARBONATE 800 MG: 800 TABLET, FILM COATED ORAL at 10:05

## 2021-02-24 RX ADMIN — IPRATROPIUM BROMIDE AND ALBUTEROL SULFATE 1 AMPULE: .5; 3 SOLUTION RESPIRATORY (INHALATION) at 10:28

## 2021-02-24 RX ADMIN — Medication 10 ML: at 10:08

## 2021-02-24 RX ADMIN — Medication 1 MG: at 06:06

## 2021-02-24 RX ADMIN — FAMOTIDINE 10 MG: 20 TABLET, FILM COATED ORAL at 10:05

## 2021-02-24 RX ADMIN — MEROPENEM 500 MG: 500 INJECTION, POWDER, FOR SOLUTION INTRAVENOUS at 15:38

## 2021-02-24 RX ADMIN — INSULIN LISPRO 1 UNITS: 100 INJECTION, SOLUTION INTRAVENOUS; SUBCUTANEOUS at 12:56

## 2021-02-24 RX ADMIN — Medication 81 MG: at 10:05

## 2021-02-24 RX ADMIN — IPRATROPIUM BROMIDE AND ALBUTEROL SULFATE 1 AMPULE: .5; 3 SOLUTION RESPIRATORY (INHALATION) at 19:07

## 2021-02-24 RX ADMIN — INSULIN LISPRO 1 UNITS: 100 INJECTION, SOLUTION INTRAVENOUS; SUBCUTANEOUS at 21:55

## 2021-02-24 RX ADMIN — IPRATROPIUM BROMIDE AND ALBUTEROL SULFATE 1 AMPULE: .5; 3 SOLUTION RESPIRATORY (INHALATION) at 13:56

## 2021-02-24 RX ADMIN — IPRATROPIUM BROMIDE AND ALBUTEROL SULFATE 1 AMPULE: .5; 3 SOLUTION RESPIRATORY (INHALATION) at 06:52

## 2021-02-24 RX ADMIN — Medication 10 ML: at 21:56

## 2021-02-24 ASSESSMENT — PAIN SCALES - GENERAL: PAINLEVEL_OUTOF10: 7

## 2021-02-24 NOTE — PROGRESS NOTES
 oxyCODONE-acetaminophen (PERCOCET) 5-325 MG per tablet 1 tablet  1 tablet Oral Q6H PRN Richard Gonzalez MD   1 tablet at 02/23/21 1230    morphine injection 1 mg  1 mg Intravenous Q4H PRN Richard Gonzalez MD   1 mg at 02/24/21 0606    ipratropium-albuterol (Frias Stair) nebulizer solution 1 ampule  1 ampule Inhalation Q4H WA Richard Gonzalez MD   1 ampule at 02/24/21 1028    aspirin EC tablet 81 mg  81 mg Oral Daily Richard Gonzalez MD   81 mg at 02/24/21 1005    albuterol (PROVENTIL) nebulizer solution 2.5 mg  2.5 mg Nebulization Q6H PRN Richard Gonzalez MD        sevelamer (RENVELA) tablet 800 mg  800 mg Oral Daily Richard Gonzalez MD   800 mg at 02/24/21 1005    glucose (GLUTOSE) 40 % oral gel 15 g  15 g Oral PRN Richard Gonzalez MD        dextrose 50 % IV solution  12.5 g Intravenous PRN Richard Gonzalez MD   12.5 g at 02/19/21 2110    glucagon (rDNA) injection 1 mg  1 mg Intramuscular PRN Richard Gonzalez MD        dextrose 5 % solution  100 mL/hr Intravenous PRN Richard Gonzalez MD 50 mL/hr at 02/20/21 0054 50 mL/hr at 02/20/21 0054    potassium chloride (KLOR-CON M) extended release tablet 40 mEq  40 mEq Oral PRN Richard Gonzalez MD        Or    potassium bicarb-citric acid (EFFER-K) effervescent tablet 40 mEq  40 mEq Oral PRN Richard Gonzalez MD        Or    potassium chloride 10 mEq/100 mL IVPB (Peripheral Line)  10 mEq Intravenous PRN Richard Gonzalez MD        promethazine (PHENERGAN) tablet 12.5 mg  12.5 mg Oral Q6H PRN Richard Gonzalez MD        Or    ondansetron [facility]) injection 4 mg  4 mg Intravenous Q6H PRN Richard Gonzalez MD   4 mg at 02/21/21 0224    magnesium hydroxide (MILK OF MAGNESIA) 400 MG/5ML suspension 30 mL  30 mL Oral Daily PRN Richard Gonzalez MD        insulin lispro (HUMALOG) injection vial 0-6 Units  0-6 Units Subcutaneous TID  Richard Gonzalez MD  insulin lispro (HUMALOG) injection vial 0-3 Units  0-3 Units Subcutaneous Nightly Loly Cooney MD   1 Units at 02/23/21 2122    heparin (porcine) injection 5,000 Units  5,000 Units Subcutaneous 3 times per day Loly Cooney MD   5,000 Units at 02/24/21 1446    sodium chloride flush 0.9 % injection 10 mL  10 mL Intravenous 2 times per day Loly Cooney MD   10 mL at 02/24/21 1008    sodium chloride flush 0.9 % injection 10 mL  10 mL Intravenous PRN Loly Cooney MD           Past Medical History:  Past Medical History:   Diagnosis Date    Blind right eye     partial vision loss in the L eye too, due to DM    Blindness of one eye     Right     [facility]est[facility] failure) [facility]     CKD (chronic kidney disease), stage IV (HCC)     secondary to diabetic nephropathy    Diabetes ([facility]     Diabetes mellitus with ophthalmic manifestations     dx'd in 2000 but likely ongoing before that, was dx'd when she went blind in the R eye    Glaucoma     Hemodialysis patient ([facility]     dialysis tues, wilian, [facility]    Hypertension     2000, dx'd at same time as the DM    Obesity     Renal osteodystrophy     Smoker     Type II or unspecified type diabetes mellitus with renal manifestations, uncontrolled(250.42)        Past Surgical History:  Past Surgical History:   Procedure Laterality Date    CARPAL TUNNEL RELEASE      CATARACT REMOVAL     [address] [place], and 1979    CHOLECYSTECTOMY      COLONOSCOPY N/A 3/9/2018    Dr ReevesQnatuk-Mwexllweeoohdj-Qdpezchbichrp AP (-) dysplasia x 1, tubular AP x  (-) dysplasia x 1, HP x 4--1 yr recall    DIALYSIS FISTULA CREATION Left 4/23/15 [facility]    Left proximal ulnar artery to cephalic vein AVF creation    EYE SURGERY      laser, several times     TYMPANOSTOMY TUBE PLACEMENT Bilateral     VASCULAR SURGERY Left 5/11/15 [facility] US-guided cannulation left distal upper arm cephalic vein with 4-St Lucian glide sheath; LUE AV f'grams with venography SVC; left cephalic vein BAM with [id number] julieth balloon; [place] [facility] [place]    VASCULAR SURGERY Left 5/28/15 S    Percutaneous cannulation left distal radial artery with 4-[place] Lucian glide sheath; LUE AV f'grams with venography SVC; left cephalic vein balloon a'plasty with [id number] julieth balloon and [id number] julieth balloon; proximal and mid upper arm cephalic vein BAM with [id number] julieth balloon; [place] [facility] [place]    VASCULAR SURGERY Left 6/15/15 S    US-guided cannulation left cephalic vein with 4-St Lucian and later 7-St Lucian sheath; LUE AV f'grams including venography SVC; left cephalic vein stenosis balloon a'plasty with [id number] cutting balloon; [place] f[place]gram and [place] [facility] [place]    VASCULAR SURGERY  05/15/2017    S. Left upper fistulograms/venograms.  VASCULAR SURGERY  02/14/2018    S. Left upper fistulograms, left subclavian BA 12x40 atlas, left cephalic arch BA 52W70 conquest.    VASCULAR SURGERY  02/13/2019    S. Left upper fistulograms,BA left SCV 8x40 conquest,stent left SCV, viabahn 13x50,left SCV stent BA 12x40 conquest.       Family History  Family History   Problem Relation Age of Onset    Heart Disease Mother     Heart Attack Mother     Glaucoma Sister     Diabetes Sister     Diabetes Brother     Kidney Disease Brother     Blindness Brother     Stroke Maternal Grandmother     Stroke Maternal Grandfather     Colon Cancer Neg Hx     Colon Polyps Neg Hx     Liver Cancer Neg Hx     Liver Disease Neg Hx     Esophageal Cancer Neg Hx     Rectal Cancer Neg Hx     Stomach Cancer Neg Hx        Social History  Social History     Socioeconomic History    Marital status:      Spouse name: Not on file    Number of children: Not on file    Years of education: Not on file    Highest education level: Not on file   Occupational History  Not on file   Social Needs    Financial resource strain: Not on file    Food insecurity     Worry: Not on file     Inability: Not on file    Transportation needs     Medical: Not on file     Non-medical: Not on file   Tobacco Use    Smoking status: Current Every Day Smoker     Packs/day: 1.00     Years: 40.00     Pack years: 40.00    Smokeless tobacco: Never Used   Substance and Sexual Activity    Alcohol use: No     Alcohol/week: 0.0 standard drinks    Drug use: Yes     Types: Marijuana    Sexual activity: Not Currently     Partners: Male   Lifestyle    Physical activity     Days per week: Not on file     Minutes per session: Not on file    Stress: Not on file   Relationships    Social connections     Talks on phone: Not on file     Gets together: Not on file     Attends Religion service: Not on file     Active member of club or organization: Not on file     Attends meetings of clubs or organizations: Not on file     Relationship status: Not on file    Intimate partner violence     Fear of current or ex partner: Not on file     Emotionally abused: Not on file     Physically abused: Not on file     Forced sexual activity: Not on file   Other Topics Concern    Not on file   Social History Narrative    Not on file         Review of Systems:  History obtained from chart review and the patient  General ROS: No fever or chills  Respiratory ROS: No cough, shortness of breath, wheezing  Cardiovascular ROS: no chest pain or dyspnea on exertion  Gastrointestinal ROS: No abdominal pain or melena  Genito-Urinary ROS: No dysuria or hematuria  Musculoskeletal ROS: No joint pain or swelling         Objective:  Blood pressure (!) 141/72, pulse 71, temperature 98.3 °F (36.8 °C), resp. rate 18, weight 138 lb 9.6 oz (62.9 kg), SpO2 98 %.     Intake/Output Summary (Last 24 hours) at 2/24/2021 1039  Last data filed at 2/24/2021 0904  Gross per 24 hour   Intake 540 ml   Output 6000 ml   Net -5460 ml General: alert and oriented x3   Chest:  clear to auscultation bilaterally without respiratory distress  CVS: regular rate and rhythm  Abdominal: soft, nontender, normal bowel sounds  Extremities: no cyanosis or edema  Skin: warm and dry without rash    Labs:  BMP:   Recent Labs     02/22/21 0219 02/23/21 0151 02/24/21 0236    139 137   K 4.3  4.3 4.3 3.8   CL 96* 93* 94*   CO2 25 24 26   PHOS  --   --  3.5   BUN 33* 41* 22   CREATININE 4.3* 5.5* 3.6*   CALCIUM 10.0 9.7 9.6     CBC:   Recent Labs     02/22/21 0219 02/23/21 0151 02/24/21 0236   WBC 3.6* 4.3* 3.7*   HGB 10.9* 10.7* 10.9*   HCT 35.9* 35.0* 33.9*   .4* 100.0* 97.4   * 133 120*     LIVER PROFILE:   Recent Labs     02/22/21 0219 02/23/21 0151   AST 42*  --    ALT 37*  --    LIPASE  --  69*   BILITOT 0.6  --    ALKPHOS 297*  --      PT/INR: No results for input(s): PROTIME, INR in the last 72 hours. APTT: No results for input(s): APTT in the last 72 hours. BNP:  No results for input(s): BNP in the last 72 hours. Ionized Calcium:No results for input(s): IONCA in the last 72 hours. Magnesium:No results for input(s): MG in the last 72 hours. Phosphorus:  Recent Labs     02/24/21 0236   PHOS 3.5     HgbA1C: No results for input(s): LABA1C in the last 72 hours. Hepatic:   Recent Labs     02/22/21 0219   ALKPHOS 297*   ALT 37*   AST 42*   PROT 7.3   BILITOT 0.6   LABALBU 4.6     Lactic Acid: No results for input(s): LACTA in the last 72 hours. Troponin: No results for input(s): CKTOTAL, CKMB, TROPONINT in the last 72 hours. ABGs: No results for input(s): PH, PCO2, PO2, HCO3, O2SAT in the last 72 hours. CRP:  No results for input(s): CRP in the last 72 hours. Sed Rate:  No results for input(s): SEDRATE in the last 72 hours. Cultures:   No results for input(s): CULTURE in the last 72 hours.     Radiology reports as per the Radiologist  Radiology: Ct Head Wo Contrast    Result Date: 2/19/2021 Examination. CT HEAD WO CONTRAST 2/19/2021 12:55 PM History: Altered mental status. DLP: 1305 mGycm. The CT scan of the head is performed without intravenous contrast enhancement. The images are acquired in axial plane and subsequent reconstruction in coronal and sagittal planes. The comparison is made with the previous study dated 9/1/2020. There is no evidence of mass. No midline shift. There is no evidence of an intracranial hemorrhage or hematoma. Moderately dilated ventricles, basal cistern and the cortical sulci are similar to the previous study suggesting a chronic volume loss. A few scattered areas of chronic white matter ischemia in the centrum semiovale bilaterally are stable. The gray-white matter differentiation is maintained. A partially empty sella turcica is seen. The images reviewed in bone window show no acute bony abnormality. The visualized paranasal sinuses and mastoid air cells are clear. A curvilinear metallic density in the lateral margin of the left globe/orbit is similar to the previous study. No acute intracranial abnormality.  Signed by Dr Zuleyka Burciaga on 2/19/2021 2:38 PM    Ct Chest W Contrast    Result Date: 2/19/2021 EXAM: CT CHEST W CONTRAST -- 2/19/2021 12:55 PM HISTORY: 56 years, Female, abnormal chest x-ray COMPARISON: 2/19/2021 DLP: 769.5 mGy cm. Automated exposure control was utilized to minimize patient radiation dose. TECHNIQUE: Enhanced  CT images of the chest obtained with multiplanar reformats. FINDINGS: AIRWAYS/PULMONARY PARENCHYMA: The central airways are midline and patent. Expiratory phase of imaging. Small right pleural effusion with compressive enhancing breast atelectasis involving the right middle and lower lobes. Right upper lobe with a pleural-based 1.7 x 2.0 cm opacity, which could represent round atelectasis, scarring or nodule. VASCULATURE: Thoracic aorta is normal in course and caliber. Mild calcified aortic atherosclerosis. Normal pulmonary artery caliber. No large central pulmonary artery filling defect on this non-CTA exam. Left subclavian vein with short segment severe stenosis on axial image 29. This is just lateral to the stent. There are left chest wall collaterals suggesting that this is a chronic finding. CARDIAC:  Borderline heart size. No pericardial effusion. Scattered coronary artery calcifications. MEDIASTINUM: There is no mediastinal or hilar lymphadenopathy by CT size criteria. Esophagus has normal coarse, caliber and wall thickness. EXTRATHORACIC: The visualized portions of the thyroid gland are unremarkable. No thoracic inlet or axillary adenopathy. Body wall edema. Left chest wall collaterals. INCLUDED UPPER ABDOMEN: The liver appears prominent low density, which could be due to fatty infiltration or phase of contrast. Cholecystectomy clips. No bile duct dilation. Small ascites. There is wedge-shaped low-density in the spleen, concerning for age-indeterminate splenic infarct. Visualized portion of the pancreas and adrenal glands appear within normal limits. Atrophic kidneys partially visualized. OSSEOUS: Mild degenerative changes in the spine. No acute or aggressive bony finding. 1. Right pleural effusion with compressive atelectasis of the right middle and lower lobes. 2. Right upper lobe with a pleural-based 1.7 x 2.0 cm opacity, which could represent round atelectasis, scarring or nodule. Recommend follow-up CT chest in 3 months per Fleischner criteria. 3. Left subclavian vein with a short segment severe stenosis, just lateral to the stent. Left chest wall collaterals suggest chronicity. 4. Borderline heart size with scattered coronary artery calcifications. 5. Wedge-shaped low-density in the spleen concerning for age-indeterminate splenic infarct. 6. Low-density of the liver which could be seen with fatty infiltration or exaggerated due to phase of contrast. Small volume ascites.  Signed by Dr Juan Francisco Clifton on 2/19/2021 2:42 PM    Ct Abdomen Pelvis W Iv Contrast Additional Contrast? None    Result Date: 2/19/2021 criteria, which may be reactive. ABDOMINAL WALL: Significant body wall edema. Prominent vessel in the partially visualized left distal upper arm, possibly fistula. OSSEOUS: Subacute to chronic right superior pubic ramus, right inferior pubic ramus and left inferior pubic rami fractures. 1. Ascites and mesenteric edema Limited evaluation of the peripancreatic fat. Normal enhancement of the pancreas. 2. Wedge-shaped hypodensity in the spleen concerning for age-indeterminate infarct. Splenic vein and artery appear patent and normal caliber. 3. Enlarged low-density liver concerning for fatty liver. 4. Right pleural effusion, ascites, body wall edema suggests fluid overload. 5. Heavily calcified atherosclerosis. 6. Subacute to chronic appearing pubic rami fractures. 7. See separately dictated CT chest of the same day. Signed by Dr Diego Salcido on 2/19/2021 2:54 PM    Xr Chest Portable    Result Date: 2/19/2021  XR CHEST PORTABLE 2/19/2021 10:18 AM HISTORY: Shortness of breath COMPARISON: Chest x-ray dated 10/10/2020. FINDINGS: Mild cardiomegaly which is stable. Bilateral coarsened interstitial markings with large layering right pleural effusion. No pneumothorax. Vascular stent along the left brachiocephalic vein. No acute bony abnormality. 1. Interstitial edema with large layering right pleural effusion. Signed by Dr Jonathan Mcelroy on 2/19/2021 11:44 AM       Assessment     1.  End-stage renal disease/maintenance dialysis. 2.  Type II diabetic nephropathy. 3.  Noncompliant dialysis patient. 4.  Anemia of chronic kidney disease. 5.  Hyperkalemia now resolved. 6.  Clinically volume overloaded  7.  Right-sided pleural effusion/large. 8.  Metabolic encephalopathy  9.  Pubic rami fractures/recent fall       Plan:  Dialysis is due in a.m. Follow-up labs.

## 2021-02-24 NOTE — CARE COORDINATION
SW visited with Pt this afternoon; answered some basic questions with repetition from this writer; stated she lives at home with boyfriend, Kadie Krishnamurthy; couldn't confirm if she needs or has [place] or uses any DME; she did say it was okay to speak with her boyfriend on the phone about her case/needs.   Electronically signed by ALVIN Desai on 2/24/2021 at 3:49 PM

## 2021-02-24 NOTE — PROGRESS NOTES
[facility] Progress Note    Patient:  Thaddeus Boo  YOB: 1960  Date of Service: 2/24/2021  MRN: [id number]   Acct: [de-identified]   Primary Care Physician: RAMONA Pan  Advance Directive: Full Code  Admit Date: 2/19/2021       Hospital Day: 5        CHIEF COMPLAINT:    Chief Complaint   Patient presents with    Diarrhea     pt missed dialysis on Thursday.  Failure To Thrive       2/24/2021 10:27 AM  Subjective / Interval History:   02/24/2021  No acute changes or acute overnight event reported. Patient gradually improving. More awake and alert this a.m. Laying comfortably in bed in no acute distress. Denies any other acute complaints or distress at this time. 02/23/2021  Patient seen and examined. Doing well. No new complaints. No acute changes or acute overnight event reported. Mental status improved tolerating HD.    02/22/2021  Patient seen and examined this AM.  More awake and alert to the same. No new complaints. No acute changes or acute overnight events reported. Patient oriented to self. Continues to have some intermittent groaning. No specific localized pain reported or identified. 02/21/2021  Patient seen and examined this AM.  Reportedly agitated with continued moaning however with no specific complaint overnight. No changes in mental status. Patient more awake and alert this a.m. though. Laying comfortably in bed in no apparent acute distress. 02/20/2021  Patient seen and and examined. Continues to be confused with intermittent jerky movements in upper extremity. No other acute changes or acute overnight event reported. Tolerated HD yesterday, and currently receiving a second session of HD. Review of Systems:   Review of Systems   Unable to perform ROS: Mental status change     DIET CARB CONTROL;  Renal    Intake/Output Summary (Last 24 hours) at 2/24/2021 1027  Last data filed at 2/24/2021 0904  Gross per 24 hour  potassium bicarb-citric acid (EFFER-K) effervescent tablet 40 mEq  40 mEq Oral PRN Armando Zazueta MD        Or    potassium chloride 10 mEq/100 mL IVPB (Peripheral Line)  10 mEq Intravenous PRN Armando Zazueta MD        promethazine (PHENERGAN) tablet 12.5 mg  12.5 mg Oral Q6H PRN Armando Zazueta MD        Or    ondansetron [facility]) injection 4 mg  4 mg Intravenous Q6H PRN Armando Zazueta MD   4 mg at 02/21/21 0224    magnesium hydroxide (MILK OF MAGNESIA) 400 MG/5ML suspension 30 mL  30 mL Oral Daily PRN Armando Zazueta MD        insulin lispro (HUMALOG) injection vial 0-6 Units  0-6 Units Subcutaneous TID WC Armando Zazueta MD        insulin lispro (HUMALOG) injection vial 0-3 Units  0-3 Units Subcutaneous Nightly Armando Zazueta MD   1 Units at 02/23/21 2122    heparin (porcine) injection 5,000 Units  5,000 Units Subcutaneous 3 times per day Armando Zazueta MD   5,000 Units at 02/24/21 7538    sodium chloride flush 0.9 % injection 10 mL  10 mL Intravenous 2 times per day Armando Zazueta MD   10 mL at 02/24/21 1008    sodium chloride flush 0.9 % injection 10 mL  10 mL Intravenous PRN Armando Zazueta MD             dextrose 50 mL/hr (02/20/21 0054)      famotidine  10 mg Oral Daily    darbepoetin angie-polysorbate  60 mcg Subcutaneous Weekly    meropenem (MERREM) 500 mg in SWFI 10 mL IV syringe  500 mg Intravenous Q24H    vancomycin (VANCOCIN) intermittent dosing (placeholder)   Other RX Placeholder    ipratropium-albuterol  1 ampule Inhalation Q4H WA    aspirin EC  81 mg Oral Daily    sevelamer  800 mg Oral Daily    insulin lispro  0-6 Units Subcutaneous TID WC    insulin lispro  0-3 Units Subcutaneous Nightly    heparin (porcine)  5,000 Units Subcutaneous 3 times per day    sodium chloride flush  10 mL Intravenous 2 times per day oxyCODONE-acetaminophen, morphine, albuterol, glucose, dextrose, glucagon (rDNA), dextrose, potassium chloride **OR** potassium alternative oral replacement **OR** potassium chloride, promethazine **OR** ondansetron, magnesium hydroxide, sodium chloride flush  DIET CARB CONTROL; Renal       Labs:   CBC with DIFF:  Recent Labs     02/22/21 0219 02/23/21  0151 02/24/21  0236   WBC 3.6* 4.3* 3.7*   RBC 3.54* 3.50* 3.48*   HGB 10.9* 10.7* 10.9*   HCT 35.9* 35.0* 33.9*   .4* 100.0* 97.4   MCH 30.8 30.6 31.3*   MCHC 30.4* 30.6* 32.2*   RDW 17.9* 17.7* 17.1*   * 133 120*   MPV 10.2 10.4 9.7   NEUTOPHILPCT 68.4* 70.4* 68.2*   LYMPHOPCT 14.0* 11.4* 13.1*   MONOPCT 14.0* 13.3* 15.0*   EOSRELPCT 3.0 4.0 2.9   BASOPCT 0.3 0.2 0.3   NEUTROABS 2.5 3.0 2.6   LYMPHSABS 0.5* 0.5* 0.5*   MONOSABS 0.50 0.60 0.60   EOSABS 0.10 0.20 0.10   BASOSABS 0.00 0.00 0.00       CMP/BMP:  Recent Labs     02/22/21 0219 02/23/21  0151 02/24/21  0236    139 137   K 4.3  4.3 4.3 3.8   CL 96* 93* 94*   CO2 25 24 26   ANIONGAP 19 22* 17   GLUCOSE 88 130* 115*   BUN 33* 41* 22   CREATININE 4.3* 5.5* 3.6*   LABGLOM 10* 8* 13*   CALCIUM 10.0 9.7 9.6   PROT 7.3  --   --    LABALBU 4.6  --   --    BILITOT 0.6  --   --    ALKPHOS 297*  --   --    ALT 37*  --   --    AST 42*  --   --          CRP:    No results for input(s): CRP in the last 72 hours. Sed Rate:    No results for input(s): SEDRATE in the last 72 hours. HgBA1c:  No components found for: HGBA1C  FLP:    Lab Results   Component Value Date    TRIG 65 02/19/2021    HDL 61 06/09/2020    LDLCALC 25 06/09/2020    LDLDIRECT 120 07/28/2015     TSH:  No results found for: TSH  Troponin T: No results for input(s): TROPONINI in the last 72 hours. Pro-BNP: No results for input(s): BNP in the last 72 hours. INR:   No results for input(s): INR in the last 72 hours.   ABGs:   Lab Results   Component Value Date    PHART 7.340 02/19/2021    PO2ART 72.0 02/19/2021 No acute intracranial abnormality.  Signed by Dr Carolyn Jaramillo on 2/19/2021 2:38 PM    Ct Chest W Contrast    Result Date: 2/19/2021 EXAM: CT CHEST W CONTRAST -- 2/19/2021 12:55 PM HISTORY: 56 years, Female, abnormal chest x-ray COMPARISON: 2/19/2021 DLP: 769.5 mGy cm. Automated exposure control was utilized to minimize patient radiation dose. TECHNIQUE: Enhanced  CT images of the chest obtained with multiplanar reformats. FINDINGS: AIRWAYS/PULMONARY PARENCHYMA: The central airways are midline and patent. Expiratory phase of imaging. Small right pleural effusion with compressive enhancing breast atelectasis involving the right middle and lower lobes. Right upper lobe with a pleural-based 1.7 x 2.0 cm opacity, which could represent round atelectasis, scarring or nodule. VASCULATURE: Thoracic aorta is normal in course and caliber. Mild calcified aortic atherosclerosis. Normal pulmonary artery caliber. No large central pulmonary artery filling defect on this non-CTA exam. Left subclavian vein with short segment severe stenosis on axial image 29. This is just lateral to the stent. There are left chest wall collaterals suggesting that this is a chronic finding. CARDIAC:  Borderline heart size. No pericardial effusion. Scattered coronary artery calcifications. MEDIASTINUM: There is no mediastinal or hilar lymphadenopathy by CT size criteria. Esophagus has normal coarse, caliber and wall thickness. EXTRATHORACIC: The visualized portions of the thyroid gland are unremarkable. No thoracic inlet or axillary adenopathy. Body wall edema. Left chest wall collaterals. INCLUDED UPPER ABDOMEN: The liver appears prominent low density, which could be due to fatty infiltration or phase of contrast. Cholecystectomy clips. No bile duct dilation. Small ascites. There is wedge-shaped low-density in the spleen, concerning for age-indeterminate splenic infarct. Visualized portion of the pancreas and adrenal glands appear within normal limits. Atrophic kidneys partially visualized. OSSEOUS: Mild degenerative changes in the spine. No acute or aggressive bony finding. 1. Right pleural effusion with compressive atelectasis of the right middle and lower lobes. 2. Right upper lobe with a pleural-based 1.7 x 2.0 cm opacity, which could represent round atelectasis, scarring or nodule. Recommend follow-up CT chest in 3 months per Fleischner criteria. 3. Left subclavian vein with a short segment severe stenosis, just lateral to the stent. Left chest wall collaterals suggest chronicity. 4. Borderline heart size with scattered coronary artery calcifications. 5. Wedge-shaped low-density in the spleen concerning for age-indeterminate splenic infarct. 6. Low-density of the liver which could be seen with fatty infiltration or exaggerated due to phase of contrast. Small volume ascites.  Signed by Dr Alexys Clark on 2/19/2021 2:42 PM    Ct Abdomen Pelvis W Iv Contrast Additional Contrast? None    Result Date: 2/19/2021 EXAM: CT ABDOMEN PELVIS W IV CONTRAST -- 2/19/2021 12:55 PM HISTORY: 56 years, Female, markedly elevated lipase, abdominal pain, diarrhea, ESRD on hemodialysis COMPARISON: No existing relevant imaging studies available DLP: 769.5 mGy cm. Automated exposure control was utilized to minimize patient radiation dose. TECHNIQUE: Enhanced CT images obtained of the abdomen and pelvis with multiplanar reformats. FINDINGS: LUNG BASES: Right pleural effusion with compressive atelectasis. Underlying inferior heart size with scattered coronary artery calcifications. LIVER: Diffuse low-density of the liver concerning for fatty liver. Liver appears mildly enlarged. Patent portal and hepatic veins. GALLBLADDER and BILARY: Cholecystectomy clips. No bile duct dilation. PANCREAS: Normal enhancement of the pancreas. There is ascites and mesenteric edema throughout the abdomen, which limited evaluation of the peripancreatic fat. SPLEEN: There is a wedge-shaped hypodensity in the spleen concerning for infarct. Splenic vein appears grossly patent. Normal caliber splenic artery. ADRENALS: No adrenal mass. URINARY: Atrophic kidneys. No hydronephrosis. Renal vascular calcifications. No convincing nephrolithiasis. Urinary bladder is underdistended, which limits evaluation. Grossly normal CT appearance of the uterus. Adnexa are not discretely identified. PERITONEUM: No ascites/free fluid. No pneumoperitoneum. BOWEL: Stomach and duodenum have normal course and caliber. No abnormally dilated loops of bowel or bowel wall thickening. Normal short appendix or appendiceal stump on axial images 61-57. RETROPERITONEUM:  Aorta normal in course and caliber with calcified atherosclerosis. Celiac and superior mesenteric arteries patent. Bilateral renal arteries diminutive. Inferior mesenteric artery opacified.  Heavily calcified iliac arteries, limited in evaluation on this non-CTA exam. Scattered retroperitoneal lymph nodes, not enlarged by CT size  Likely Severe (3-4+) mitral regurgitation is present. No stenosis.   Mild tricuspid regurgitation.  RVSP estimated at 50 mmHg.      Signature    ----------------------------------------------------------------   Electronically signed by Domingo Oliver MD(Interpreting physician)   on 05/08/2020 07:30 PM   ----------------------------------------------------------------        Objective:   Vitals:   BP (!) 141/72   Pulse 71   Temp 98.3 °F (36.8 °C)   Resp 18   Wt 138 lb 9.6 oz (62.9 kg)   SpO2 98%   BMI 27.99 kg/m²     Patient Vitals for the past 24 hrs:   BP Temp Temp src Pulse Resp SpO2 Weight   02/24/21 0815 (!) 141/72 98.3 °F (36.8 °C)  71 18 98 %    02/24/21 0545       138 lb 9.6 oz (62.9 kg)   02/23/21 2355 (!) 156/80 97.1 °F (36.2 °C) Temporal 86 16 93 %    02/23/21 1935 (!) 160/72 96.9 °F (36.1 °C) Temporal 84 18 95 %    02/23/21 1540     20 94 %    02/23/21 1230 (!) 150/90         02/23/21 1215  97.8 °F (36.6 °C)  88 18     02/23/21 1200 (!) 163/86 98.3 °F (36.8 °C)  84 16         24HR INTAKE/OUTPUT:      Intake/Output Summary (Last 24 hours) at 2/24/2021 1027  Last data filed at 2/24/2021 0904  Gross per 24 hour   Intake 540 ml   Output 6000 ml   Net -5460 ml       Physical Exam  Vitals signs and nursing note reviewed. Constitutional:       General: She is not in acute distress. Appearance: Normal appearance. She is ill-appearing. She is not toxic-appearing or diaphoretic. HENT:      Head: Normocephalic and atraumatic. Right Ear: External ear normal.      Left Ear: External ear normal.      Nose: Nose normal. No congestion or rhinorrhea. Eyes:      General: No scleral icterus. Right eye: No discharge. Left eye: No discharge. Extraocular Movements: Extraocular movements intact. Conjunctiva/sclera: Conjunctivae normal.   Neck:      Musculoskeletal: Normal range of motion and neck supple. No neck rigidity or muscular tenderness. Vascular: No carotid bruit. Cardiovascular:      Rate and Rhythm: Normal rate and regular rhythm. Pulses: Normal pulses. Heart sounds: Normal heart sounds. No friction rub. No gallop. Pulmonary:      Effort: Pulmonary effort is normal. No respiratory distress. Breath sounds: Normal breath sounds. No stridor. No wheezing, rhonchi or rales. Comments: Diminished breath sounds bilaterally  Chest:      Chest wall: No tenderness. Abdominal:      General: Bowel sounds are normal. There is no distension. Palpations: Abdomen is soft. Tenderness: There is abdominal tenderness. There is no guarding or rebound. Comments: Diffuse tenderness to palpation. No guarding, rigidity, rebound tenderness noted/elicited   Musculoskeletal: Normal range of motion. General: No swelling, tenderness, deformity or signs of injury. Right lower leg: Edema present. Left lower leg: Edema present. Skin:     General: Skin is warm and dry. Capillary Refill: Capillary refill takes less than 2 seconds. Coloration: Skin is not jaundiced or pale. Findings: No bruising, erythema, lesion or rash. Neurological:      General: No focal deficit present. Mental Status: She is alert. She is disoriented. Cranial Nerves: No cranial nerve deficit. Psychiatric:         Mood and Affect: Mood normal.      Comments: Unable to assess judgment and thought content.            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 *        Brief Summary  Ms. Kellen Webb a 56 y.o. female with a history of DMT2, ESRD, on HD, CHF, presenting to [facility] ED (2021) on account of diarrhea, with an associated abdominal/back pain, generalized weakness and lethargy.     Patient reportedly has missed 1 session of HD, due to the symptoms noted above.     Patient reportedly is on baseline home oxygen.     Initial work-up significant for;  AB.3 / 4.8  Hyperkalemia, with a potassium of 5.3  Initial BUN/creatinine of 77/7.2  Hypoglycemia with initial glucose of 67  Leukopenia, with WBC of 3.7  Lactic acid within lab reference range: 0.8  Markedly elevated lipase: 1,492  Mildly elevated ESR: 30 mm/hr  Elevated CRP: 7.45  Molecular respiratory panel with COVID-19 grossly negative  Patient admitted for further work-up and management.     Generalized weakness  Diarrhea  Altered mental status  Transaminitis  · Initial work-up (2021) significant for;  · AB.3 / 4.8  · Leukopenia, with WBC of 3.7  · Lactic acid within lab reference range: 0.8  · Markedly elevated lipase: 1,492 (2021)  · Mildly elevated ESR: 30 mm/hr  · Elevated CRP: 7.45  · Mildly elevated ALT/AST: 55/54  · Elevated alkaline phosphatase: 353  · Molecular respiratory panel with COVID-19 grossly negative -2021  · Lipase level improving  · Blood cultures no growth to date  · Mildly elevated Procalcitonin level: 0.24 (2021)  · Elevated CRP: 7.45 [0.00 - 0.50 mg/dL] · Mildly elevated ESR  · Elevated GGT  · Elevated serum ferritin  · Triglyceride level within lab reference range  · Ammonia level  · Acute viral hepatitis panel grossly negative  · HIV rapid 1 & 2 screen negative  · Acetaminophen level within lab reference range  · Ethanol level < 10  Mg/dL  · Elevated GGT level  · LDH within lab reference range  · Stool for C. difficile antigen toxin as well as ova and parasites. · Empiric antibiotic (Vancomycin and Meropenem), pending blood cultures  · CT head without contrast, with no acute intracranial abnormality. · CT chest with contrast (02/19/2021): Impression: Right pleural effusion with compressive atelectasis of the right middle and lower lobes. . Right upper lobe with a pleural-based 1.7 x 2.0 cm opacity, which could represent round atelectasis, scarring or nodule. Recommend follow-up CT chest in 3 months per Fleischner criteria. Left subclavian vein with a short segment severe stenosis, just lateral to the stent. Left chest wall collaterals suggest chronicity. . Borderline heart size with scattered coronary artery calcifications. . Wedge-shaped low-density in the spleen concerning for age-indeterminate splenic infarct. Low-density of the liver which could be seen with fatty infiltration or exaggerated due to phase of contrast. Small volume ascites. · CT abdomen pelvis with IV contrast (02/19/2021): Impression: Ascites and mesenteric edema Limited evaluation of the peripancreatic fat. Normal enhancement of the pancreas. Wedge-shaped hypodensity in the spleen concerning for age-indeterminate infarct. Splenic vein and artery appear patent andmnormal caliber. Enlarged low-density liver concerning for fatty liver. Right pleural effusion, ascites, body wall edema suggests fluid overload. Heavily calcified atherosclerosis. Subacute to chronic appearing pubic rami fractures. · Abdominal complete (02/21/2021): Impression: Liver does not appear fatty by ultrasound. Low density appearance on prior CT may have been related to phase of contrast. Cholecystectomy. Common bile duct measures up to 1 cm, similar compared to 2/19/2021. This can be seen as reservoir effect after cholecystectomy. Bilateral renal atrophy. Right pleural effusion. Small volume ascites  · GI following recommendations much appreciated       Subacute to chronic pubic rami fractures. · Continue supportive symptomatic management including pain management as needed  · Seen by Orthopedic surgeryappreciate recommendations  · Continue PT OT      History of ESRD, on scheduled HD Tues_Thur_Sat  · Hyperkalemia, resolved  · Initial BUN/creatinine of 77/7.2  · Reportedly missed 1 session of HD (02/18/2021), prior to presentation  · Nephrology followingappreciate recommendations  · Continue scheduled HD  · Continue management as per nephrology rec        History of insulin-dependent Diabetes Mellitus II  · Hypoglycemic on presentation  · Hemoglobin A1C: 6.7% (02/19/2021)  · Inpatient Regimens to include;  ? -Hold basal Insulin Glargine (Lantus), for now, given concern for hypoglycemia  ? - Insulin Lispro (Humalog) on a Low dose sliding scale  · Monitor POC glucose, and adjust insulin regimen accordingly based on daily insulin requirement.         Continue management of other chronic medical conditions - see above and orders. Advance Directive: Full Code    DIET CARB CONTROL; Renal         Consults Made:   IP CONSULT TO NEPHROLOGY  IP CONSULT TO NEPHROLOGY  IP CONSULT TO SOCIAL WORK  IP CONSULT TO GI  PHARMACY TO DOSE VANCOMYCIN  IP CONSULT TO ORTHOPEDIC SURGERY    DVT prophylaxis: Heparin      Discharge planning:   Mental status improving  Continue current management and work-up as noted above. Time Spent is 25 mins in the examination, evaluation, counseling and review of medications, assessment and plan. Electronically signed by   Crystal Boston MD, MPH,   Internal Medicine Hospitalist   2/24/2021 10:27 AM

## 2021-02-24 NOTE — PROGRESS NOTES
Physical Therapy  Name: Luis Turpin  MRN:  [id number]  Date of service:  2/24/2021 02/24/21 1437   Safety Devices   Type of devices Left in bed;Call light within reach; Bed alarm in place       Electronically signed by Luis Dyer PTA on 2/24/2021 at 2:39 PM

## 2021-02-24 NOTE — PROGRESS NOTES
Physician Progress Note      PATIENT:               Heaven Wadsworth  CSN #:                  [id number]  :                       1960  ADMIT DATE:       2021 10:43 AM  DISCH DATE:  RESPONDING  PROVIDER #:        Shaunna Godoy MD          QUERY TEXT:    Pt admitted with ESRD who missed dialysis noted to have confusion. Noted   documentation of metabolic encephalopathy by Dr. José Miguel Law  and Dr. Chichi Hwang    nephrology consultants. If possible, please document in progress notes and   discharge summary:    The medical record reflects the following:  Risk Factors: ESRD, Fractures and pain, DMII  Clinical Indicators: Confusion on admission, noted not answering questions by   Care management and nursing staff. Treatment:  Hemodialysis, Nephrology consult, Vancomycin, Merrem,    ml/hr. Thank you    Fartun Santamaria RN,BSN, [facility]  [phone]  Options provided:  -- Metabolic encephalopathy confirmed present on admission  -- Metabolic encephalopathy  ruled out  -- Defer to nephrology  consultant documentation regarding metabolic   encephalopathy  -- Other - I will add my own diagnosis  -- Disagree - Not applicable / Not valid  -- Disagree - Clinically unable to determine / Unknown  -- Refer to Clinical Documentation Reviewer    PROVIDER RESPONSE TEXT:    The diagnosis of metabolic encephalopathy was confirmed as present on   admission.     Query created by: Semaj Loomis on 2021 9:50 AM      Electronically signed by:  Shaunna Godoy MD 2021 6:15 PM

## 2021-02-24 NOTE — PROGRESS NOTES
Physical Therapy  Name: Dwayne Coronado  MRN:  [id number]  Date of service:  2/24/2021 02/24/21 1130   Subjective   Subjective Pt willing to get up to recliner with therapy   General Comment   Comments pt non verbal   Bed Mobility   Supine to Sit Contact guard assistance   Scooting Contact guard assistance   Transfers   Sit to Stand Minimal Assistance   Stand to sit Minimal Assistance   Bed to Chair Minimal assistance   Comment Pt unsteady so just transferred from  bed to recliner    Ambulation   Ambulation? No   Short term goals   Time Frame for Short term goals 2 wks   Short term goal 1 SUP<>SIT, SBA   Short term goal 2 SIT<>STAND, CGA   Short term goal 3 STAND STEP TF'S WITH RW AND CGA   Short term goal 4 AMB 50 FT WITH RW, CGA   Conditions Requiring Skilled Therapeutic Intervention   Body structures, Functions, Activity limitations Decreased functional mobility ; Decreased endurance;Decreased strength;Decreased safe awareness;Decreased cognition   Assessment Pt up to recliner, non verbal this session. changed gown and linens as pt had dried blood on them. Pt was cooperative. Activity Tolerance   Activity Tolerance Patient limited by endurance; Patient limited by cognitive status   Safety Devices   Type of devices Left in chair;Call light within reach; Chair alarm in place       Electronically signed by Naga Waller PTA on 2/24/2021 at 11:33 AM

## 2021-02-24 NOTE — TELEPHONE ENCOUNTER
Pt has no showed the following appointments:    2/24/2021 9/9/2020 6/17/2020    How would you like to proceed?

## 2021-02-25 LAB
ANION GAP SERPL CALCULATED.3IONS-SCNC: 17 MMOL/L (ref 7–19)
BASOPHILS ABSOLUTE: 0 K/UL (ref 0–0.2)
BASOPHILS RELATIVE PERCENT: 0.2 % (ref 0–1)
BUN BLDV-MCNC: 34 MG/DL (ref 8–23)
CALCIUM SERPL-MCNC: 10 MG/DL (ref 8.8–10.2)
CHLORIDE BLD-SCNC: 93 MMOL/L (ref 98–111)
CO2: 27 MMOL/L (ref 22–29)
CREAT SERPL-MCNC: 5 MG/DL (ref 0.5–0.9)
EOSINOPHILS ABSOLUTE: 0.1 K/UL (ref 0–0.6)
EOSINOPHILS RELATIVE PERCENT: 2.4 % (ref 0–5)
GFR AFRICAN AMERICAN: 11
GFR NON-AFRICAN AMERICAN: 9
GLUCOSE BLD-MCNC: 107 MG/DL (ref 70–99)
GLUCOSE BLD-MCNC: 157 MG/DL (ref 70–99)
GLUCOSE BLD-MCNC: 160 MG/DL (ref 70–99)
GLUCOSE BLD-MCNC: 186 MG/DL (ref 70–99)
GLUCOSE BLD-MCNC: 197 MG/DL (ref 74–109)
HCT VFR BLD CALC: 33.5 % (ref 37–47)
HEMOGLOBIN: 10.3 G/DL (ref 12–16)
IMMATURE GRANULOCYTES #: 0 K/UL
LYMPHOCYTES ABSOLUTE: 0.5 K/UL (ref 1.1–4.5)
LYMPHOCYTES RELATIVE PERCENT: 12.7 % (ref 20–40)
MCH RBC QN AUTO: 30.7 PG (ref 27–31)
MCHC RBC AUTO-ENTMCNC: 30.7 G/DL (ref 33–37)
MCV RBC AUTO: 100 FL (ref 81–99)
MONOCYTES ABSOLUTE: 0.6 K/UL (ref 0–0.9)
MONOCYTES RELATIVE PERCENT: 13.7 % (ref 0–10)
NEUTROPHILS ABSOLUTE: 3 K/UL (ref 1.5–7.5)
NEUTROPHILS RELATIVE PERCENT: 70.5 % (ref 50–65)
PDW BLD-RTO: 17.4 % (ref 11.5–14.5)
PERFORMED ON: ABNORMAL
PLATELET # BLD: 116 K/UL (ref 130–400)
PMV BLD AUTO: 9.9 FL (ref 9.4–12.3)
POTASSIUM REFLEX MAGNESIUM: 4.1 MMOL/L (ref 3.5–5)
RBC # BLD: 3.35 M/UL (ref 4.2–5.4)
SODIUM BLD-SCNC: 137 MMOL/L (ref 136–145)
VANCOMYCIN RANDOM: 10.8 UG/ML
WBC # BLD: 4.2 K/UL (ref 4.8–10.8)

## 2021-02-25 PROCEDURE — 94640 AIRWAY INHALATION TREATMENT: CPT

## 2021-02-25 PROCEDURE — 6370000000 HC RX 637 (ALT 250 FOR IP): Performed by: INTERNAL MEDICINE

## 2021-02-25 PROCEDURE — 80048 BASIC METABOLIC PNL TOTAL CA: CPT

## 2021-02-25 PROCEDURE — 2580000003 HC RX 258: Performed by: INTERNAL MEDICINE

## 2021-02-25 PROCEDURE — 85025 COMPLETE CBC W/AUTO DIFF WBC: CPT

## 2021-02-25 PROCEDURE — 1210000000 HC MED SURG R&B

## 2021-02-25 PROCEDURE — 6360000002 HC RX W HCPCS: Performed by: INTERNAL MEDICINE

## 2021-02-25 PROCEDURE — 97535 SELF CARE MNGMENT TRAINING: CPT

## 2021-02-25 PROCEDURE — 97165 OT EVAL LOW COMPLEX 30 MIN: CPT

## 2021-02-25 PROCEDURE — 80202 ASSAY OF VANCOMYCIN: CPT

## 2021-02-25 PROCEDURE — 82947 ASSAY GLUCOSE BLOOD QUANT: CPT

## 2021-02-25 PROCEDURE — 8010000000 HC HEMODIALYSIS ACUTE INPT

## 2021-02-25 PROCEDURE — 36415 COLL VENOUS BLD VENIPUNCTURE: CPT

## 2021-02-25 RX ADMIN — HEPARIN SODIUM 5000 UNITS: 5000 INJECTION INTRAVENOUS; SUBCUTANEOUS at 14:52

## 2021-02-25 RX ADMIN — IPRATROPIUM BROMIDE AND ALBUTEROL SULFATE 1 AMPULE: .5; 3 SOLUTION RESPIRATORY (INHALATION) at 14:24

## 2021-02-25 RX ADMIN — IPRATROPIUM BROMIDE AND ALBUTEROL SULFATE 1 AMPULE: .5; 3 SOLUTION RESPIRATORY (INHALATION) at 18:58

## 2021-02-25 RX ADMIN — IPRATROPIUM BROMIDE AND ALBUTEROL SULFATE 1 AMPULE: .5; 3 SOLUTION RESPIRATORY (INHALATION) at 07:23

## 2021-02-25 RX ADMIN — SEVELAMER CARBONATE 800 MG: 800 TABLET, FILM COATED ORAL at 12:43

## 2021-02-25 RX ADMIN — HEPARIN SODIUM 5000 UNITS: 5000 INJECTION INTRAVENOUS; SUBCUTANEOUS at 06:03

## 2021-02-25 RX ADMIN — IPRATROPIUM BROMIDE AND ALBUTEROL SULFATE 1 AMPULE: .5; 3 SOLUTION RESPIRATORY (INHALATION) at 12:12

## 2021-02-25 RX ADMIN — MEROPENEM 500 MG: 500 INJECTION, POWDER, FOR SOLUTION INTRAVENOUS at 14:47

## 2021-02-25 RX ADMIN — VANCOMYCIN HYDROCHLORIDE 1000 MG: 10 INJECTION, POWDER, LYOPHILIZED, FOR SOLUTION INTRAVENOUS at 14:47

## 2021-02-25 RX ADMIN — Medication 10 ML: at 12:46

## 2021-02-25 RX ADMIN — HEPARIN SODIUM 5000 UNITS: 5000 INJECTION INTRAVENOUS; SUBCUTANEOUS at 20:17

## 2021-02-25 RX ADMIN — Medication 81 MG: at 12:43

## 2021-02-25 RX ADMIN — INSULIN LISPRO 1 UNITS: 100 INJECTION, SOLUTION INTRAVENOUS; SUBCUTANEOUS at 20:17

## 2021-02-25 RX ADMIN — FAMOTIDINE 10 MG: 20 TABLET, FILM COATED ORAL at 12:45

## 2021-02-25 ASSESSMENT — PAIN SCALES - GENERAL: PAINLEVEL_OUTOF10: 0

## 2021-02-25 NOTE — CARE COORDINATION
The [address] at [facility]  Notification of Admission Decision      [] [facility]       Please write discharge orders and summary prior to discharge. [] Patient acceptance to Rehab pending the following :    [x] Eval in progress: patient refused to participate with therapy today. Will see how she does tomorrow. [] Patient determined to be ineligible for services at [facility] because : We recommend you consider        Thank you for your referral, we appreciate you.     Electronically Signed by Alireza Tang, Admissions Coordinator 2/25/2021 3:30 PM

## 2021-02-25 NOTE — PROGRESS NOTES
Physical Therapy  Name: Gauri Nava  MRN:  [id number]  Date of service:  2/25/2021 02/25/21 8394   General   Missed reason Conflicting appointment   General Comment   Comments Dialysis       Electronically signed by Priya Vyas PTA on 2/25/2021 at 9:38 AM

## 2021-02-25 NOTE — PROGRESS NOTES
[facility]    Patient:  Cordelia Medellin  YOB: 1960  Date of Service: 2/25/2021  MRN: [id number]   Acct: [de-identified]   Primary Care Physician: RAMONA Sanford  Advance Directive: Full Code  Admit Date: 2/19/2021       Hospital Day: 6        CHIEF COMPLAINT:    Chief Complaint   Patient presents with    Diarrhea     pt missed dialysis on Thursday.  Failure To Thrive       2/25/2021 7:53 AM  Subjective / Interval History:   02/25/2021  Patient seen and examined this AM.  Mental status improved. More awake and alert this a.m. Endorses overall improvement. No acute changes or acute overnight event reported. Patient laying comfortably in bed in no apparent acute distress. 02/24/2021  No acute changes or acute overnight event reported. Patient gradually improving. More awake and alert this a.m. Laying comfortably in bed in no acute distress. Denies any other acute complaints or distress at this time. 02/23/2021  Patient seen and examined. Doing well. No new complaints. No acute changes or acute overnight event reported. Mental status improved tolerating HD.    02/22/2021  Patient seen and examined this AM.  More awake and alert to the same. No new complaints. No acute changes or acute overnight events reported. Patient oriented to self. Continues to have some intermittent groaning. No specific localized pain reported or identified. 02/21/2021  Patient seen and examined this AM.  Reportedly agitated with continued moaning however with no specific complaint overnight. No changes in mental status. Patient more awake and alert this a.m. though. Laying comfortably in bed in no apparent acute distress.       02/20/2021  dextrose 50 % IV solution  12.5 g Intravenous PRN Jono Brown MD   12.5 g at 02/19/21 2110    glucagon (rDNA) injection 1 mg  1 mg Intramuscular PRN Jono Brown MD        dextrose 5 % solution  100 mL/hr Intravenous PRN Jono Brown MD 50 mL/hr at 02/20/21 0054 50 mL/hr at 02/20/21 0054    potassium chloride (KLOR-CON M) extended release tablet 40 mEq  40 mEq Oral PRN Jono Brown MD        Or    potassium bicarb-citric acid (EFFER-K) effervescent tablet 40 mEq  40 mEq Oral PRN Jono Brown MD        Or    potassium chloride 10 mEq/100 mL IVPB (Peripheral Line)  10 mEq Intravenous PRN Jono Brown MD        promethazine (PHENERGAN) tablet 12.5 mg  12.5 mg Oral Q6H PRN Jono Brown MD        Or    ondansetron [facility]) injection 4 mg  4 mg Intravenous Q6H PRN Jono Brown MD   4 mg at 02/21/21 0224    magnesium hydroxide (MILK OF MAGNESIA) 400 MG/5ML suspension 30 mL  30 mL Oral Daily PRN Jono Brown MD        insulin lispro (HUMALOG) injection vial 0-6 Units  0-6 Units Subcutaneous TID WC Jono Brown MD   1 Units at 02/24/21 1256    insulin lispro (HUMALOG) injection vial 0-3 Units  0-3 Units Subcutaneous Nightly Jono Brown MD   1 Units at 02/24/21 2155    heparin (porcine) injection 5,000 Units  5,000 Units Subcutaneous 3 times per day Jono Brown MD   5,000 Units at 02/25/21 0603    sodium chloride flush 0.9 % injection 10 mL  10 mL Intravenous 2 times per day Jono Brown MD   10 mL at 02/24/21 2156    sodium chloride flush 0.9 % injection 10 mL  10 mL Intravenous PRN Jono Brown MD             dextrose 50 mL/hr (02/20/21 0054)      famotidine  10 mg Oral Daily    darbepoetin angie-polysorbate  60 mcg Subcutaneous Weekly    meropenem (MERREM) 500 mg in SWFI 10 mL IV syringe  500 mg Intravenous Q24H    vancomycin (VANCOCIN) intermittent dosing (placeholder)   Other RX Placeholder  ipratropium-albuterol  1 ampule Inhalation Q4H WA    aspirin EC  81 mg Oral Daily    sevelamer  800 mg Oral Daily    insulin lispro  0-6 Units Subcutaneous TID     insulin lispro  0-3 Units Subcutaneous Nightly    heparin (porcine)  5,000 Units Subcutaneous 3 times per day    sodium chloride flush  10 mL Intravenous 2 times per day     oxyCODONE-acetaminophen, morphine, albuterol, glucose, dextrose, glucagon (rDNA), dextrose, potassium chloride **OR** potassium alternative oral replacement **OR** potassium chloride, promethazine **OR** ondansetron, magnesium hydroxide, sodium chloride flush  DIET CARB CONTROL; Renal       Labs:   CBC with DIFF:  Recent Labs     02/23/21  0151 02/24/21  0236 02/25/21  0247   WBC 4.3* 3.7* 4.2*   RBC 3.50* 3.48* 3.35*   HGB 10.7* 10.9* 10.3*   HCT 35.0* 33.9* 33.5*   .0* 97.4 100.0*   MCH 30.6 31.3* 30.7   MCHC 30.6* 32.2* 30.7*   RDW 17.7* 17.1* 17.4*    120* 116*   MPV 10.4 9.7 9.9   NEUTOPHILPCT 70.4* 68.2* 70.5*   LYMPHOPCT 11.4* 13.1* 12.7*   MONOPCT 13.3* 15.0* 13.7*   EOSRELPCT 4.0 2.9 2.4   BASOPCT 0.2 0.3 0.2   NEUTROABS 3.0 2.6 3.0   LYMPHSABS 0.5* 0.5* 0.5*   MONOSABS 0.60 0.60 0.60   EOSABS 0.20 0.10 0.10   BASOSABS 0.00 0.00 0.00       CMP/BMP:  Recent Labs     02/23/21  0151 02/24/21  0236 02/25/21  0247    137 137   K 4.3 3.8 4.1   CL 93* 94* 93*   CO2 24 26 27   ANIONGAP 22* 17 17   GLUCOSE 130* 115* 197*   BUN 41* 22 34*   CREATININE 5.5* 3.6* 5.0*   LABGLOM 8* 13* 9*   CALCIUM 9.7 9.6 10.0         CRP:    No results for input(s): CRP in the last 72 hours. Sed Rate:    No results for input(s): SEDRATE in the last 72 hours. HgBA1c:  No components found for: HGBA1C  FLP:    Lab Results   Component Value Date    TRIG 65 02/19/2021    HDL 61 06/09/2020    LDLCALC 25 06/09/2020    LDLDIRECT 120 07/28/2015     TSH:  No results found for: TSH  Troponin T: No results for input(s): TROPONINI in the last 72 hours. Pro-BNP: No results for input(s): BNP in the last 72 hours. INR:   No results for input(s): INR in the last 72 hours. ABGs:   Lab Results   Component Value Date    PHART 7.340 02/19/2021    PO2ART 72.0 02/19/2021    JEK3YTS 46.0 02/19/2021     UA:No results for input(s): NITRITE, COLORU, PHUR, LABCAST, WBCUA, RBCUA, MUCUS, TRICHOMONAS, YEAST, BACTERIA, CLARITYU, SPECGRAV, LEUKOCYTESUR, UROBILINOGEN, BILIRUBINUR, BLOODU, GLUCOSEU, AMORPHOUS in the last 72 hours. Invalid input(s): [facility]      Culture Results:    No results for input(s): CXSURG in the last 720 hours. Blood Culture Recent:   Recent Labs     02/19/21  1154   BC No growth after 5 days of incubation. Cultures:   No results for input(s): CULTURE in the last 72 hours. No results for input(s): BC, Alvia Timbo in the last 72 hours. No results for input(s): CXSURG in the last 72 hours. Recent Labs     02/24/21  0236   PHOS 3.5     No results for input(s): AST, ALT, ALB, BILITOT, ALKPHOS, ALB in the last 72 hours.       RAD:   Ct Head Wo Contrast    Result Date: 2/19/2021 Examination. CT HEAD WO CONTRAST 2/19/2021 12:55 PM History: Altered mental status. DLP: 1305 mGycm. The CT scan of the head is performed without intravenous contrast enhancement. The images are acquired in axial plane and subsequent reconstruction in coronal and sagittal planes. The comparison is made with the previous study dated 9/1/2020. There is no evidence of mass. No midline shift. There is no evidence of an intracranial hemorrhage or hematoma. Moderately dilated ventricles, basal cistern and the cortical sulci are similar to the previous study suggesting a chronic volume loss. A few scattered areas of chronic white matter ischemia in the centrum semiovale bilaterally are stable. The gray-white matter differentiation is maintained. A partially empty sella turcica is seen. The images reviewed in bone window show no acute bony abnormality. The visualized paranasal sinuses and mastoid air cells are clear. A curvilinear metallic density in the lateral margin of the left globe/orbit is similar to the previous study. No acute intracranial abnormality.  Signed by Dr Nataliia Lancaster on 2/19/2021 2:38 PM    Ct Chest W Contrast    Result Date: 2/19/2021 EXAM: CT CHEST W CONTRAST -- 2/19/2021 12:55 PM HISTORY: 56 years, Female, abnormal chest x-ray COMPARISON: 2/19/2021 DLP: 769.5 mGy cm. Automated exposure control was utilized to minimize patient radiation dose. TECHNIQUE: Enhanced  CT images of the chest obtained with multiplanar reformats. FINDINGS: AIRWAYS/PULMONARY PARENCHYMA: The central airways are midline and patent. Expiratory phase of imaging. Small right pleural effusion with compressive enhancing breast atelectasis involving the right middle and lower lobes. Right upper lobe with a pleural-based 1.7 x 2.0 cm opacity, which could represent round atelectasis, scarring or nodule. VASCULATURE: Thoracic aorta is normal in course and caliber. Mild calcified aortic atherosclerosis. Normal pulmonary artery caliber. No large central pulmonary artery filling defect on this non-CTA exam. Left subclavian vein with short segment severe stenosis on axial image 29. This is just lateral to the stent. There are left chest wall collaterals suggesting that this is a chronic finding. CARDIAC:  Borderline heart size. No pericardial effusion. Scattered coronary artery calcifications. MEDIASTINUM: There is no mediastinal or hilar lymphadenopathy by CT size criteria. Esophagus has normal coarse, caliber and wall thickness. EXTRATHORACIC: The visualized portions of the thyroid gland are unremarkable. No thoracic inlet or axillary adenopathy. Body wall edema. Left chest wall collaterals. INCLUDED UPPER ABDOMEN: The liver appears prominent low density, which could be due to fatty infiltration or phase of contrast. Cholecystectomy clips. No bile duct dilation. Small ascites. There is wedge-shaped low-density in the spleen, concerning for age-indeterminate splenic infarct. Visualized portion of the pancreas and adrenal glands appear within normal limits. Atrophic kidneys partially visualized. OSSEOUS: Mild degenerative changes in the spine. No acute or aggressive bony finding. 1. Right pleural effusion with compressive atelectasis of the right middle and lower lobes. 2. Right upper lobe with a pleural-based 1.7 x 2.0 cm opacity, which could represent round atelectasis, scarring or nodule. Recommend follow-up CT chest in 3 months per Fleischner criteria. 3. Left subclavian vein with a short segment severe stenosis, just lateral to the stent. Left chest wall collaterals suggest chronicity. 4. Borderline heart size with scattered coronary artery calcifications. 5. Wedge-shaped low-density in the spleen concerning for age-indeterminate splenic infarct. 6. Low-density of the liver which could be seen with fatty infiltration or exaggerated due to phase of contrast. Small volume ascites.  Signed by Dr Harvinder Powers on 2/19/2021 2:42 PM    Ct Abdomen Pelvis W Iv Contrast Additional Contrast? None    Result Date: 2/19/2021 criteria, which may be reactive. ABDOMINAL WALL: Significant body wall edema. Prominent vessel in the partially visualized left distal upper arm, possibly fistula. OSSEOUS: Subacute to chronic right superior pubic ramus, right inferior pubic ramus and left inferior pubic rami fractures. 1. Ascites and mesenteric edema Limited evaluation of the peripancreatic fat. Normal enhancement of the pancreas. 2. Wedge-shaped hypodensity in the spleen concerning for age-indeterminate infarct. Splenic vein and artery appear patent and normal caliber. 3. Enlarged low-density liver concerning for fatty liver. 4. Right pleural effusion, ascites, body wall edema suggests fluid overload. 5. Heavily calcified atherosclerosis. 6. Subacute to chronic appearing pubic rami fractures. 7. See separately dictated CT chest of the same day. Signed by Dr Sulaiman De Leon on 2/19/2021 2:54 PM    Xr Chest Portable    Result Date: 2/19/2021  XR CHEST PORTABLE 2/19/2021 10:18 AM HISTORY: Shortness of breath COMPARISON: Chest x-ray dated 10/10/2020. FINDINGS: Mild cardiomegaly which is stable. Bilateral coarsened interstitial markings with large layering right pleural effusion. No pneumothorax. Vascular stent along the left brachiocephalic vein. No acute bony abnormality. 1. Interstitial edema with large layering right pleural effusion. Signed by Dr Andree Hanna on 2/19/2021 11:44 AM      Recent 2D ECHOCARDIOGRAM  05/08/2020    Summary   LV is normal in size with normal systolic function. LV ejection fraction   estimated at 50-60%.  Mild concentric LVH. Probable grade 2 diastolic dysfunction.   RV is normal in size with normal systolic function.   Mild to moderate left atrial enlargement.   Normal right atrial size.   Aortic valve is trileaflet with mild leaflet thickening. Normal opening.   No stenosis or regurgitation noted.   Mitral valve is mild to moderately thickened with normal leaflet mobility.  Likely Severe (3-4+) mitral regurgitation is present. No stenosis.   Mild tricuspid regurgitation.  RVSP estimated at 50 mmHg.      Signature    ----------------------------------------------------------------   Electronically signed by Cody Oliver MD(Interpreting physician)   on 05/08/2020 07:30 PM   ----------------------------------------------------------------        Objective:   Vitals:   BP (!) 161/83   Pulse 88   Temp 97.7 °F (36.5 °C) (Temporal)   Resp 16   Wt 136 lb 8 oz (61.9 kg)   SpO2 97%   BMI 27.57 kg/m²     Patient Vitals for the past 24 hrs:   BP Temp Temp src Pulse Resp SpO2 Weight   02/25/21 0725      97 %    02/25/21 0654 (!) 161/83 97.7 °F (36.5 °C) Temporal 88 16 95 %    02/25/21 0515       136 lb 8 oz (61.9 kg)   02/25/21 0058 (!) 165/72 97.5 °F (36.4 °C) Temporal 92 20 92 %    02/24/21 2010 (!) 174/58         02/24/21 1826 (!) 195/77 97.6 °F (36.4 °C) Temporal 96 18 95 %    02/24/21 1215 133/77 97.7 °F (36.5 °C)  70 16 98 %    02/24/21 0815 (!) 141/72 98.3 °F (36.8 °C)  71 18 98 %        24HR INTAKE/OUTPUT:      Intake/Output Summary (Last 24 hours) at 2/25/2021 0753  Last data filed at 2/24/2021 1321  Gross per 24 hour   Intake 720 ml   Output    Net 720 ml       Physical Exam  Vitals signs and nursing note reviewed. Constitutional:       General: She is not in acute distress. Appearance: Normal appearance. She is ill-appearing. She is not toxic-appearing or diaphoretic. HENT:      Head: Normocephalic and atraumatic. Right Ear: External ear normal.      Left Ear: External ear normal.      Nose: Nose normal. No congestion or rhinorrhea. Eyes:      General: No scleral icterus. Right eye: No discharge. Left eye: No discharge. Extraocular Movements: Extraocular movements intact.       Conjunctiva/sclera: Conjunctivae normal.   Neck: Musculoskeletal: Normal range of motion and neck supple. No neck rigidity or muscular tenderness. Vascular: No carotid bruit. Cardiovascular:      Rate and Rhythm: Normal rate and regular rhythm. Pulses: Normal pulses. Heart sounds: Normal heart sounds. No friction rub. No gallop. Pulmonary:      Effort: Pulmonary effort is normal. No respiratory distress. Breath sounds: Normal breath sounds. No stridor. No wheezing, rhonchi or rales. Chest:      Chest wall: No tenderness. Abdominal:      General: Bowel sounds are normal. There is no distension. Palpations: Abdomen is soft. Tenderness: There is no abdominal tenderness. There is no guarding or rebound. Comments: Diffuse tenderness to palpation. No guarding, rigidity, rebound tenderness noted/elicited   Musculoskeletal: Normal range of motion. General: No swelling, tenderness, deformity or signs of injury. Right lower leg: Edema present. Left lower leg: Edema present. Skin:     General: Skin is warm and dry. Capillary Refill: Capillary refill takes less than 2 seconds. Coloration: Skin is not jaundiced or pale. Findings: No bruising, erythema, lesion or rash. Neurological:      General: No focal deficit present. Mental Status: She is alert. She is disoriented. Cranial Nerves: No cranial nerve deficit. Psychiatric:         Mood and Affect: Mood normal.      Comments: Unable to assess judgment and thought content.            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 Replacing Inactive Diagnoses         HTN (hypertension) 2021 Yes    ESRD (end stage renal disease) on dialysis ([facility] 2021 Yes    Acute diarrhea 2021 Yes          Active Problems:    Elevated lipase    Bilateral pubic rami fractures, closed, initial encounter ([facility]    Type 2 diabetes mellitus with ophthalmic complication, with long-term current use of insulin ([facility])    HTN (hypertension)    ESRD (end stage renal disease) on dialysis ([facility]    Acute diarrhea  Resolved Problems:    * No resolved hospital problems. *        Brief Summary  Ms. Av Boudreaux a 56 y.o. female with a history of DMT2, ESRD, on HD, CHF, presenting to [facility] ED (2021) on account of diarrhea, with an associated abdominal/back pain, generalized weakness and lethargy.     Patient reportedly has missed 1 session of HD, due to the symptoms noted above.     Patient reportedly is on baseline home oxygen.     Initial work-up significant for;  AB.3  4.8  Hyperkalemia, with a potassium of 5.3  Initial BUN/creatinine of 77/7.2  Hypoglycemia with initial glucose of 67  Leukopenia, with WBC of 3.7  Lactic acid within lab reference range: 0.8  Markedly elevated lipase: 1,492  Mildly elevated ESR: 30 mm/hr  Elevated CRP: 7.45  Molecular respiratory panel with COVID-19 grossly negative  Patient admitted for further work-up and management.     Generalized weakness  Diarrhea  Altered mental status  Transaminitis  · Initial work-up (2021) significant for;  · AB.3  4.8  · Leukopenia, with WBC of 3.7  · Lactic acid within lab reference range: 0.8  · Markedly elevated lipase: 1,492 (2021)  · Mildly elevated ESR: 30 mm/hr  · Elevated CRP: 7.45  · Mildly elevated ALT/AST: 55/54  · Elevated alkaline phosphatase: 353  · Molecular respiratory panel with COVID-19 grossly negative -2021  · Lipase level improved  · Blood cultures no growth to date  · Mildly elevated Procalcitonin level: 0.24 (2021) · Elevated CRP: 7.45 [0.00 - 0.50 mg/dL]  · Mildly elevated ESR  · Elevated GGT  · Elevated serum ferritin  · Triglyceride level within lab reference range  · Ammonia level  · Acute viral hepatitis panel grossly negative  · HIV rapid 1 & 2 screen negative  · Acetaminophen level within lab reference range  · Ethanol level < 10  Mg/dL  · Elevated GGT level  · LDH within lab reference range  · Stool for C. difficile antigen toxin as well as ova and parasites, however patient with no further diarrhea. · Empiric antibiotic (Vancomycin and Meropenem), pending blood cultures  · CT head without contrast, with no acute intracranial abnormality. · CT chest with contrast (02/19/2021): Impression: Right pleural effusion with compressive atelectasis of the right middle and lower lobes. . Right upper lobe with a pleural-based 1.7 x 2.0 cm opacity, which could represent round atelectasis, scarring or nodule. Recommend follow-up CT chest in 3 months per Fleischner criteria. Left subclavian vein with a short segment severe stenosis, just lateral to the stent. Left chest wall collaterals suggest chronicity. . Borderline heart size with scattered coronary artery calcifications. . Wedge-shaped low-density in the spleen concerning for age-indeterminate splenic infarct. Low-density of the liver which could be seen with fatty infiltration or exaggerated due to phase of contrast. Small volume ascites. · CT abdomen pelvis with IV contrast (02/19/2021): Impression: Ascites and mesenteric edema Limited evaluation of the peripancreatic fat. Normal enhancement of the pancreas. Wedge-shaped hypodensity in the spleen concerning for age-indeterminate infarct. Splenic vein and artery appear patent andmnormal caliber. Enlarged low-density liver concerning for fatty liver. Right pleural effusion, ascites, body wall edema suggests fluid overload. Heavily calcified atherosclerosis. Subacute to chronic appearing pubic rami fractures. · Abdominal complete (02/21/2021): Impression: Liver does not appear fatty by ultrasound. Low density appearance on prior CT may have been related to phase of contrast. Cholecystectomy. Common bile duct measures up to 1 cm, similar compared to 2/19/2021. This can be seen as reservoir effect after cholecystectomy. Bilateral renal atrophy. Right pleural effusion. Small volume ascites  · GI following recommendations much appreciated       Subacute to chronic pubic rami fractures. · Continue supportive symptomatic management including pain management as needed  · Seen by Orthopedic surgeryappreciate recommendations  · Continue PT OT      History of ESRD, on scheduled HD Tues_Thur_Sat  · Hyperkalemia, resolved  · Initial BUN/creatinine of 77/7.2  · Reportedly missed 1 session of HD (02/18/2021), prior to presentation  · Nephrology followingappreciate recommendations  · Continue scheduled HD  · Continue management as per nephrology rec        History of insulin-dependent Diabetes Mellitus II  · Hypoglycemic on presentation  · Hemoglobin A1C: 6.7% (02/19/2021)  · Inpatient Regimens to include;  ? -Hold basal Insulin Glargine (Lantus), for now, given concern for hypoglycemia  ? - Insulin Lispro (Humalog) on a Low dose sliding scale  · Monitor POC glucose, and adjust insulin regimen accordingly based on daily insulin requirement.         Continue management of other chronic medical conditions - see above and orders. Advance Directive: Full Code    DIET CARB CONTROL; Renal         Consults Made:   IP CONSULT TO NEPHROLOGY  IP CONSULT TO NEPHROLOGY  IP CONSULT TO SOCIAL WORK  IP CONSULT TO GI  PHARMACY TO DOSE VANCOMYCIN  IP CONSULT TO ORTHOPEDIC SURGERY    DVT prophylaxis: Heparin      Discharge planning:   Mental status improving  Continue current management and work-up as noted above.   Plan for possible discharge tomorrow: 02/26/2021 Time Spent is 25 mins in the examination, evaluation, counseling and review of medications, assessment and plan.      Electronically signed by   Reno Gavin MD, MPH,   Internal Medicine Hospitalist   2/25/2021 7:53 AM

## 2021-02-25 NOTE — PROGRESS NOTES
Patient returns from dialysis. No problems noted at this time.   Electronically signed by Ton Sagastume RN on 2/25/2021 at 4:19 PM

## 2021-02-25 NOTE — PROGRESS NOTES
Comprehensive Nutrition Assessment    Type and Reason for Visit:  Initial, RD Nutrition Re-Screen/LOS    Nutrition Recommendations/Plan: continue to monitor po intake. If needed start ONS    Nutrition Assessment:  Pt appears adequately nourished AEB fat and muscle mass. Pt in dialysis at time of visit. Breakfast was on hold. Malnutrition Assessment:  Malnutrition Status:  No malnutrition    Context:  Acute Illness     Findings of the 6 clinical characteristics of malnutrition:  Energy Intake:  Mild decrease in energy intake (Comment)  Weight Loss:  (7.1% weight decrease in 3 months)     Body Fat Loss:  No significant body fat loss     Muscle Mass Loss:  No significant muscle mass loss    Fluid Accumulation:  1 - Mild Extremities   Strength:  Not Performed    Nutrition Related Findings:  Jaundice      Wounds:  None       Current Nutrition Therapies:    DIET CARB CONTROL; Renal    Anthropometric Measures:  · Height: 4' 11\" (149.9 cm)  · Current Body Weight: 136 lb 8 oz (61.9 kg)   · Admission Body Weight: 145 lb 1.6 oz (65.8 kg)    · Usual Body Weight: 147 lb (66.7 kg)(11/2020)     · Ideal Body Weight: 95 lbs; % Ideal Body Weight 143.7 %   · BMI: 27.6  · Adjusted Body Weight:  ; No Adjustment   · BMI Categories: Overweight (BMI 25.0-29. 9)       Nutrition Diagnosis:   · Inadequate protein-energy intake related to increase demand for energy/nutrients, altered GI function as evidenced by wounds, intake 0-25%, intake 26-50%, diarrhea      Nutrition Interventions:   Food and/or Nutrient Delivery:  Continue Current Diet  Nutrition Education/Counseling:  No recommendation at this time   Coordination of Nutrition Care:  Continue to monitor while inpatient    Goals:  PO intake 50% or greater. Weight stable.   Accuchek's 150 or less       Nutrition Monitoring and Evaluation:   Behavioral-Environmental Outcomes:  None Identified   Food/Nutrient Intake Outcomes:  Food and Nutrient Intake Physical Signs/Symptoms Outcomes:  Biochemical Data, Nutrition Focused Physical Findings, Skin, Weight     Discharge Planning:     Too soon to determine     Electronically signed by Melany Pro MS, RD, LD on 2/25/21 at 1:37 PM CST    Contact: [phone]

## 2021-02-25 NOTE — PROGRESS NOTES
Physical Therapy  Name: Danie Knapp  MRN:  [id number]  Date of service:  2/25/2021 02/25/21 1441   General   Missed reason Patient declined   Subjective   Subjective Pt refused to work with therapy this afternoon. General Comment   Comments She spoke to me finally today.         Electronically signed by Goldie Delvalle PTA on 2/25/2021 at 2:42 PM

## 2021-02-25 NOTE — PROGRESS NOTES
Nephrology ([address]) Progress Note    Patient:  Ishmael Celis  YOB: 1960  Date of Service: 2/25/2021  MRN: [id number]   Acct: [de-identified]   Primary Care Physician: RAMONA La  Advance Directive: Full Code  Admit Date: 2/19/2021       Hospital Day: 6  Referring Provider: Anand Neal MD    Patient Seen, Chart, Consults, Notes, Labs, Radiology studies reviewed. Subjective:  Ishmael Celis is a 56 y.o. female  whom we were consulted for end-stage renal disease. Patient goes to [facility] dialysis clinic on Tuesday Thursday Saturday however she has skipped treatment on Thursday afternoon. She presented to emergency room on Friday morning with increasing weakness, shortness of breath. She also reports some diarrhea. Incidental finding of the labs shows potassium was 6.5 mmol. She was also clinically volume overload. She has received emergent dialysis treatment on Friday. She has received routine hemodialysis treatment on 2/20.     Seen and examined on dialysis. Patient is doing better and may be discharged home.      Dialysis   Pt was seen on RRT  Modality: Hemodialysis  Access: Arterial Venous Fistula  Location: left upper  QB: 400  QD: 700  UF: 2.2 Liters    Allergies:  Bupropion, Gabapentin, Sulfa antibiotics, Varenicline, Wellbutrin [bupropion hcl], Azithromycin, Levaquin [levofloxacin], and Penicillins    Medicines:  Current Facility-Administered Medications   Medication Dose Route Frequency Provider Last Rate Last Admin    famotidine (PEPCID) tablet 10 mg  10 mg Oral Daily Anand Neal MD   10 mg at 02/24/21 1005    darbepoetin angie-polysorbate (ARANESP) injection 60 mcg  60 mcg Subcutaneous Weekly Augusto Baumgarten, MD   60 mcg at 02/20/21 1247    meropenem (MERREM) 500 mg in sterile water 10 mL IV syringe  500 mg Intravenous Q24H Anand Neal MD   500 mg at 02/24/21 2457  vancomycin (VANCOCIN) intermittent dosing (placeholder)   Other RX Placeholder Brock Nath MD        oxyCODONE-acetaminophen (PERCOCET) 5-325 MG per tablet 1 tablet  1 tablet Oral Q6H PRN Brock Nath MD   1 tablet at 02/23/21 1230    morphine injection 1 mg  1 mg Intravenous Q4H PRN Brock Nath MD   1 mg at 02/24/21 0606    ipratropium-albuterol (DUONEB) nebulizer solution 1 ampule  1 ampule Inhalation Q4H WA Brock Nath MD   1 ampule at 02/25/21 0723    aspirin EC tablet 81 mg  81 mg Oral Daily Brock Nath MD   81 mg at 02/24/21 1005    albuterol (PROVENTIL) nebulizer solution 2.5 mg  2.5 mg Nebulization Q6H PRN Brock Nath MD        sevelamer (RENVELA) tablet 800 mg  800 mg Oral Daily Brock Nath MD   800 mg at 02/24/21 1005    glucose (GLUTOSE) 40 % oral gel 15 g  15 g Oral PRN Brock Nath MD        dextrose 50 % IV solution  12.5 g Intravenous PRN Brock Nath MD   12.5 g at 02/19/21 2110    glucagon (rDNA) injection 1 mg  1 mg Intramuscular PRN Brock Nath MD        dextrose 5 % solution  100 mL/hr Intravenous PRN Brock Nath MD 50 mL/hr at 02/20/21 0054 50 mL/hr at 02/20/21 0054    potassium chloride (KLOR-CON M) extended release tablet 40 mEq  40 mEq Oral PRZEHRA Nath MD        Or    potassium bicarb-citric acid (EFFER-K) effervescent tablet 40 mEq  40 mEq Oral PRN Brock Nath MD        Or    potassium chloride 10 mEq/100 mL IVPB (Peripheral Line)  10 mEq Intravenous PRN Brock Nath MD        promethazine (PHENERGAN) tablet 12.5 mg  12.5 mg Oral Q6H PRN Brock Nath MD        Or    ondansetron [facility]) injection 4 mg  4 mg Intravenous Q6H PRN Brock Nath MD   4 mg at 02/21/21 0224    magnesium hydroxide (MILK OF MAGNESIA) 400 MG/5ML suspension 30 mL  30 mL Oral Daily PRN Brock Nath MD  insulin lispro (HUMALOG) injection vial 0-6 Units  0-6 Units Subcutaneous TID WC Mason Bee MD   1 Units at 02/24/21 1256    insulin lispro (HUMALOG) injection vial 0-3 Units  0-3 Units Subcutaneous Nightly Mason Bee MD   1 Units at 02/24/21 2155    heparin (porcine) injection 5,000 Units  5,000 Units Subcutaneous 3 times per day Mason Bee MD   5,000 Units at 02/25/21 4262    sodium chloride flush 0.9 % injection 10 mL  10 mL Intravenous 2 times per day Mason Bee MD   10 mL at 02/24/21 2156    sodium chloride flush 0.9 % injection 10 mL  10 mL Intravenous PRN Mason Bee MD           Past Medical History:  Past Medical History:   Diagnosis Date    Blind right eye     partial vision loss in the L eye too, due to DM    Blindness of one eye     Right     [facility]est[facility] [address] [facility]     CKD (chronic kidney disease), stage IV ([facility])     secondary to diabetic nephropathy    Diabetes ([facility] [address]     Diabetes mellitus with ophthalmic manifestations     dx'd in 2000 but likely ongoing before that, was dx'd when she went blind in the R eye    Glaucoma     Hemodialysis patient ([facility] [address]     dialysis tues, thurs, sat. [facility]    Hypertension     2000, dx'd at same time as the DM    Obesity     Renal osteodystrophy     Smoker     Type II or unspecified type diabetes mellitus with renal manifestations, uncontrolled(250.42)        Past Surgical History:  Past Surgical History:   Procedure Laterality Date    CARPAL TUNNEL RELEASE      CATARACT REMOVAL     [address], and 1979    CHOLECYSTECTOMY      COLONOSCOPY N/A 3/9/2018    Dr ReevesLmqeeu-Wdqtdurgovcuqd-Suztvkuvoyhgh AP (-) dysplasia x 1, tubular AP x  (-) dysplasia x 1, HP x 4--1 yr recall    DIALYSIS FISTULA CREATION Left 4/23/15 [facility]    Left proximal ulnar artery to cephalic vein AVF creation    EYE SURGERY      laser, several times  TYMPANOSTOMY TUBE PLACEMENT Bilateral     VASCULAR SURGERY Left 5/11/15 [facility]    US-guided cannulation left distal upper arm cephalic vein with 4-Scottish glide sheath; LUE AV f'grams with venography SVC; left cephalic vein BAM with [id number] julieth balloon; [place] [facility]    VASCULAR SURGERY Left 5/28/15 [facility]    Percutaneous cannulation left distal radial artery with 4-Scottish glide sheath; LUE AV f'grams with venography SVC; left cephalic vein balloon a'plasty with [id number] julieth balloon and [id number] julieth balloon; proximal and mid upper arm cephalic vein BAM with [id number] julieth balloon; [place] [facility]    VASCULAR SURGERY Left 6/15/15 [facility]    US-guided cannulation left cephalic vein with 4-Scottish and later 7-Scottish sheath; LUE AV f'grams including venography SVC; left cephalic vein stenosis balloon a'plasty with [id number] cutting balloon; completion f'gram and venogram [facility]    VASCULAR SURGERY  05/15/2017    S. Left upper fistulograms/venograms.  VASCULAR SURGERY  02/14/2018    S. Left upper fistulograms, left subclavian BA 12x40 atlas, left cephalic arch BA 62H59 conquest.    VASCULAR SURGERY  02/13/2019    S. Left upper fistulograms,BA left SCV 8x40 conquest,stent left SCV, viabahn 13x50,left SCV stent BA 12x40 conquest.       Family History  Family History   Problem Relation Age of Onset    Heart Disease Mother     Heart Attack Mother     Glaucoma Sister     Diabetes Sister     Diabetes Brother     Kidney Disease Brother     Blindness Brother     Stroke Maternal Grandmother     Stroke Maternal Grandfather     Colon Cancer Neg Hx     Colon Polyps Neg Hx     Liver Cancer Neg Hx     Liver Disease Neg Hx     Esophageal Cancer Neg Hx     Rectal Cancer Neg Hx     Stomach Cancer Neg Hx        Social History  Social History     Socioeconomic History    Marital status:      Spouse name: Not on file    Number of children: Not on file  Years of education: Not on file    Highest education level: Not on file   Occupational History    Not on file   Social Needs    Financial resource strain: Not on file    Food insecurity     Worry: Not on file     Inability: Not on file    Transportation needs     Medical: Not on file     Non-medical: Not on file   Tobacco Use    Smoking status: Current Every Day Smoker     Packs/day: 1.00     Years: 40.00     Pack years: 40.00    Smokeless tobacco: Never Used   Substance and Sexual Activity    Alcohol use: No     Alcohol/week: 0.0 standard drinks    Drug use: Yes     Types: Marijuana    Sexual activity: Not Currently     Partners: Male   Lifestyle    Physical activity     Days per week: Not on file     Minutes per session: Not on file    Stress: Not on file   Relationships    Social connections     Talks on phone: Not on file     Gets together: Not on file     Attends Restoration service: Not on file     Active member of club or organization: Not on file     Attends meetings of clubs or organizations: Not on file     Relationship status: Not on file    Intimate partner violence     Fear of current or ex partner: Not on file     Emotionally abused: Not on file     Physically abused: Not on file     Forced sexual activity: Not on file   Other Topics Concern    Not on file   Social History Narrative    Not on file         Review of Systems:  Reviewed with the patient at the bedside, 6 points reviewed and negative except as noted above.       Objective:  BP:171/83  Hr :90    General: awake/alert   Chest:  clear to auscultation bilaterally without respiratory distress  CVS: regular rate and rhythm  Abdominal: soft, nontender, normal bowel sounds  Extremities: no cyanosis or edema  Skin: warm and dry without rash    Labs:  BMP:   Recent Labs     02/23/21  0151 02/24/21  0236 02/25/21  0247    137 137   K 4.3 3.8 4.1   CL 93* 94* 93*   CO2 24 26 27   PHOS  --  3.5  --    BUN 41* 22 34* CREATININE 5.5* 3.6* 5.0*   CALCIUM 9.7 9.6 10.0     CBC:   Recent Labs     02/23/21  0151 02/24/21  0236 02/25/21  0247   WBC 4.3* 3.7* 4.2*   HGB 10.7* 10.9* 10.3*   HCT 35.0* 33.9* 33.5*   .0* 97.4 100.0*    120* 116*     LIVER PROFILE:   Recent Labs     02/23/21 0151   LIPASE 69*     PT/INR: No results for input(s): PROTIME, INR in the last 72 hours. APTT: No results for input(s): APTT in the last 72 hours. BNP:  No results for input(s): BNP in the last 72 hours. Ionized Calcium:No results for input(s): IONCA in the last 72 hours. Magnesium:  No results for input(s): MG in the last 72 hours. Phosphorus:  Recent Labs     02/24/21 0236   PHOS 3.5     HgbA1C: No results for input(s): LABA1C in the last 72 hours. Hepatic:   No results for input(s): ALKPHOS, ALT, AST, PROT, BILITOT, BILIDIR, LABALBU in the last 72 hours. Lactic Acid: No results for input(s): LACTA in the last 72 hours. Troponin: No results for input(s): CKTOTAL, CKMB, TROPONINT in the last 72 hours. ABGs: No results for input(s): PH, PCO2, PO2, HCO3, O2SAT in the last 72 hours. CRP:  No results for input(s): CRP in the last 72 hours. Sed Rate:  No results for input(s): SEDRATE in the last 72 hours. Cultures:   No results for input(s): CULTURE in the last 72 hours. No results for input(s): BC, Tyler Aures in the last 72 hours. No results for input(s): CXSURG in the last 72 hours.     Radiology reports as per the Radiologist  Radiology: Ct Head Wo Contrast    Result Date: 2/19/2021 EXAM: CT CHEST W CONTRAST -- 2/19/2021 12:55 PM HISTORY: 56 years, Female, abnormal chest x-ray COMPARISON: 2/19/2021 DLP: 769.5 mGy cm. Automated exposure control was utilized to minimize patient radiation dose. TECHNIQUE: Enhanced  CT images of the chest obtained with multiplanar reformats. FINDINGS: AIRWAYS/PULMONARY PARENCHYMA: The central airways are midline and patent. Expiratory phase of imaging. Small right pleural effusion with compressive enhancing breast atelectasis involving the right middle and lower lobes. Right upper lobe with a pleural-based 1.7 x 2.0 cm opacity, which could represent round atelectasis, scarring or nodule. VASCULATURE: Thoracic aorta is normal in course and caliber. Mild calcified aortic atherosclerosis. Normal pulmonary artery caliber. No large central pulmonary artery filling defect on this non-CTA exam. Left subclavian vein with short segment severe stenosis on axial image 29. This is just lateral to the stent. There are left chest wall collaterals suggesting that this is a chronic finding. CARDIAC:  Borderline heart size. No pericardial effusion. Scattered coronary artery calcifications. MEDIASTINUM: There is no mediastinal or hilar lymphadenopathy by CT size criteria. Esophagus has normal coarse, caliber and wall thickness. EXTRATHORACIC: The visualized portions of the thyroid gland are unremarkable. No thoracic inlet or axillary adenopathy. Body wall edema. Left chest wall collaterals. INCLUDED UPPER ABDOMEN: The liver appears prominent low density, which could be due to fatty infiltration or phase of contrast. Cholecystectomy clips. No bile duct dilation. Small ascites. There is wedge-shaped low-density in the spleen, concerning for age-indeterminate splenic infarct. Visualized portion of the pancreas and adrenal glands appear within normal limits. Atrophic kidneys partially visualized. OSSEOUS: Mild degenerative changes in the spine. No acute or aggressive bony finding. 1. Right pleural effusion with compressive atelectasis of the right middle and lower lobes. 2. Right upper lobe with a pleural-based 1.7 x 2.0 cm opacity, which could represent round atelectasis, scarring or nodule. Recommend follow-up CT chest in 3 months per Fleischner criteria. 3. Left subclavian vein with a short segment severe stenosis, just lateral to the stent. Left chest wall collaterals suggest chronicity. 4. Borderline heart size with scattered coronary artery calcifications. 5. Wedge-shaped low-density in the spleen concerning for age-indeterminate splenic infarct. 6. Low-density of the liver which could be seen with fatty infiltration or exaggerated due to phase of contrast. Small volume ascites.  Signed by Dr Cadence Martínez on 2/19/2021 2:42 PM    Ct Abdomen Pelvis W Iv Contrast Additional Contrast? None    Result Date: 2/19/2021 EXAM: CT ABDOMEN PELVIS W IV CONTRAST -- 2/19/2021 12:55 PM HISTORY: 56 years, Female, markedly elevated lipase, abdominal pain, diarrhea, ESRD on hemodialysis COMPARISON: No existing relevant imaging studies available DLP: 769.5 mGy cm. Automated exposure control was utilized to minimize patient radiation dose. TECHNIQUE: Enhanced CT images obtained of the abdomen and pelvis with multiplanar reformats. FINDINGS: LUNG BASES: Right pleural effusion with compressive atelectasis. Underlying inferior heart size with scattered coronary artery calcifications. LIVER: Diffuse low-density of the liver concerning for fatty liver. Liver appears mildly enlarged. Patent portal and hepatic veins. GALLBLADDER and BILARY: Cholecystectomy clips. No bile duct dilation. PANCREAS: Normal enhancement of the pancreas. There is ascites and mesenteric edema throughout the abdomen, which limited evaluation of the peripancreatic fat. SPLEEN: There is a wedge-shaped hypodensity in the spleen concerning for infarct. Splenic vein appears grossly patent. Normal caliber splenic artery. ADRENALS: No adrenal mass. URINARY: Atrophic kidneys. No hydronephrosis. Renal vascular calcifications. No convincing nephrolithiasis. Urinary bladder is underdistended, which limits evaluation. Grossly normal CT appearance of the uterus. Adnexa are not discretely identified. PERITONEUM: No ascites/free fluid. No pneumoperitoneum. BOWEL: Stomach and duodenum have normal course and caliber. No abnormally dilated loops of bowel or bowel wall thickening. Normal short appendix or appendiceal stump on axial images 61-57. RETROPERITONEUM:  Aorta normal in course and caliber with calcified atherosclerosis. Celiac and superior mesenteric arteries patent. Bilateral renal arteries diminutive. Inferior mesenteric artery opacified.  Heavily calcified iliac arteries, limited in evaluation on this non-CTA exam. Scattered retroperitoneal lymph nodes, not enlarged by CT size criteria, which may be reactive. ABDOMINAL WALL: Significant body wall edema. Prominent vessel in the partially visualized left distal upper arm, possibly fistula. OSSEOUS: Subacute to chronic right superior pubic ramus, right inferior pubic ramus and left inferior pubic rami fractures. 1. Ascites and mesenteric edema Limited evaluation of the peripancreatic fat. Normal enhancement of the pancreas. 2. Wedge-shaped hypodensity in the spleen concerning for age-indeterminate infarct. Splenic vein and artery appear patent and normal caliber. 3. Enlarged low-density liver concerning for fatty liver. 4. Right pleural effusion, ascites, body wall edema suggests fluid overload. 5. Heavily calcified atherosclerosis. 6. Subacute to chronic appearing pubic rami fractures. 7. See separately dictated CT chest of the same day. Signed by Dr Cadence Martínez on 2/19/2021 2:54 PM    Xr Chest Portable    Result Date: 2/19/2021  XR CHEST PORTABLE 2/19/2021 10:18 AM HISTORY: Shortness of breath COMPARISON: Chest x-ray dated 10/10/2020. FINDINGS: Mild cardiomegaly which is stable. Bilateral coarsened interstitial markings with large layering right pleural effusion. No pneumothorax. Vascular stent along the left brachiocephalic vein. No acute bony abnormality. 1. Interstitial edema with large layering right pleural effusion. Signed by Dr Izabella Aguilera on 2/19/2021 11:44 AM       Assessment   1. End-stage renal disease/maintenance dialysis. 2.  Type II diabetic nephropathy. 3.  Noncompliant dialysis patient. 4.  Anemia of chronic kidney disease. 5.  Hyperkalemia now resolved. 6.  Clinically volume overloaded  7. Right-sided pleural effusion/large. 8.  Metabolic encephalopathy  9. Pubic rami fractures/recent fall    Plan:  Dialysis as above, follow-up labs.     Molly Pickard MD  02/25/21  9:13 AM

## 2021-02-25 NOTE — PROGRESS NOTES
Pharmacy Vancomycin Consult     Vancomycin Day: 6  Current Dosing: Pulse dosing    Temp max:  97.9    Recent Labs     02/24/21  0236 02/25/21  0247   BUN 22 34*       Recent Labs     02/24/21  0236 02/25/21  0247   CREATININE 3.6* 5.0*       Recent Labs     02/24/21  0236 02/25/21  0247   WBC 3.7* 4.2*         Intake/Output Summary (Last 24 hours) at 2/25/2021 1400  Last data filed at 2/25/2021 1217  Gross per 24 hour   Intake 0 ml   Output 2200 ml   Net -2200 ml       Culture Date Source Results   02/19/21 Blood x 2 No growth                 Ht Readings from Last 1 Encounters:   02/25/21 4' 11\" (1.499 m)        Wt Readings from Last 1 Encounters:   02/25/21 136 lb 8 oz (61.9 kg)         Body mass index is 27.57 kg/m². CrCl cannot be calculated (Unknown ideal weight.). Random level: 10.8    Assessment/Plan: Give Vancomycin 1gm IV x 1 dose today. Random level Saturday after HD.     WAYNE DENTON, PHARM D, 2/25/2021, 2:10 PM

## 2021-02-25 NOTE — PROGRESS NOTES
Patient gone to dialysis. No distress noted at this time.  Electronically signed by Shani Staples RN on 2/25/2021 at 10:14 AM

## 2021-02-26 ENCOUNTER — APPOINTMENT (OUTPATIENT)
Dept: MRI IMAGING | Age: 61
DRG: 291 | End: 2021-02-26
Payer: MEDICARE

## 2021-02-26 LAB
ANION GAP SERPL CALCULATED.3IONS-SCNC: 16 MMOL/L (ref 7–19)
BASOPHILS ABSOLUTE: 0 K/UL (ref 0–0.2)
BASOPHILS RELATIVE PERCENT: 0.2 % (ref 0–1)
BUN BLDV-MCNC: 19 MG/DL (ref 8–23)
CALCIUM SERPL-MCNC: 9.5 MG/DL (ref 8.8–10.2)
CHLORIDE BLD-SCNC: 96 MMOL/L (ref 98–111)
CO2: 30 MMOL/L (ref 22–29)
CREAT SERPL-MCNC: 3.5 MG/DL (ref 0.5–0.9)
EOSINOPHILS ABSOLUTE: 0.1 K/UL (ref 0–0.6)
EOSINOPHILS RELATIVE PERCENT: 1.9 % (ref 0–5)
GFR AFRICAN AMERICAN: 16
GFR NON-AFRICAN AMERICAN: 13
GLUCOSE BLD-MCNC: 127 MG/DL (ref 70–99)
GLUCOSE BLD-MCNC: 153 MG/DL (ref 74–109)
GLUCOSE BLD-MCNC: 156 MG/DL (ref 70–99)
GLUCOSE BLD-MCNC: 164 MG/DL (ref 70–99)
GLUCOSE BLD-MCNC: 193 MG/DL (ref 70–99)
HCT VFR BLD CALC: 30.2 % (ref 37–47)
HEMOGLOBIN: 9.7 G/DL (ref 12–16)
IMMATURE GRANULOCYTES #: 0 K/UL
LYMPHOCYTES ABSOLUTE: 0.5 K/UL (ref 1.1–4.5)
LYMPHOCYTES RELATIVE PERCENT: 12.6 % (ref 20–40)
MCH RBC QN AUTO: 31.2 PG (ref 27–31)
MCHC RBC AUTO-ENTMCNC: 32.1 G/DL (ref 33–37)
MCV RBC AUTO: 97.1 FL (ref 81–99)
MONOCYTES ABSOLUTE: 0.6 K/UL (ref 0–0.9)
MONOCYTES RELATIVE PERCENT: 13.7 % (ref 0–10)
NEUTROPHILS ABSOLUTE: 3.1 K/UL (ref 1.5–7.5)
NEUTROPHILS RELATIVE PERCENT: 71.4 % (ref 50–65)
PDW BLD-RTO: 17.1 % (ref 11.5–14.5)
PERFORMED ON: ABNORMAL
PLATELET # BLD: 111 K/UL (ref 130–400)
PMV BLD AUTO: 9.9 FL (ref 9.4–12.3)
POTASSIUM REFLEX MAGNESIUM: 3.8 MMOL/L (ref 3.5–5)
RBC # BLD: 3.11 M/UL (ref 4.2–5.4)
SODIUM BLD-SCNC: 142 MMOL/L (ref 136–145)
WBC # BLD: 4.3 K/UL (ref 4.8–10.8)

## 2021-02-26 PROCEDURE — 97530 THERAPEUTIC ACTIVITIES: CPT

## 2021-02-26 PROCEDURE — 97116 GAIT TRAINING THERAPY: CPT

## 2021-02-26 PROCEDURE — 94640 AIRWAY INHALATION TREATMENT: CPT

## 2021-02-26 PROCEDURE — 6370000000 HC RX 637 (ALT 250 FOR IP): Performed by: INTERNAL MEDICINE

## 2021-02-26 PROCEDURE — 1210000000 HC MED SURG R&B

## 2021-02-26 PROCEDURE — 82947 ASSAY GLUCOSE BLOOD QUANT: CPT

## 2021-02-26 PROCEDURE — 80048 BASIC METABOLIC PNL TOTAL CA: CPT

## 2021-02-26 PROCEDURE — 70551 MRI BRAIN STEM W/O DYE: CPT

## 2021-02-26 PROCEDURE — 36415 COLL VENOUS BLD VENIPUNCTURE: CPT

## 2021-02-26 PROCEDURE — 6360000002 HC RX W HCPCS: Performed by: INTERNAL MEDICINE

## 2021-02-26 PROCEDURE — 2580000003 HC RX 258: Performed by: INTERNAL MEDICINE

## 2021-02-26 PROCEDURE — 85025 COMPLETE CBC W/AUTO DIFF WBC: CPT

## 2021-02-26 RX ORDER — LOSARTAN POTASSIUM 50 MG/1
50 TABLET ORAL 2 TIMES DAILY
Status: DISCONTINUED | OUTPATIENT
Start: 2021-02-26 | End: 2021-02-27 | Stop reason: HOSPADM

## 2021-02-26 RX ORDER — CARVEDILOL 12.5 MG/1
12.5 TABLET ORAL 2 TIMES DAILY WITH MEALS
Status: DISCONTINUED | OUTPATIENT
Start: 2021-02-26 | End: 2021-02-27 | Stop reason: HOSPADM

## 2021-02-26 RX ADMIN — IPRATROPIUM BROMIDE AND ALBUTEROL SULFATE 1 AMPULE: .5; 3 SOLUTION RESPIRATORY (INHALATION) at 14:54

## 2021-02-26 RX ADMIN — INSULIN LISPRO 1 UNITS: 100 INJECTION, SOLUTION INTRAVENOUS; SUBCUTANEOUS at 08:35

## 2021-02-26 RX ADMIN — CARVEDILOL 12.5 MG: 12.5 TABLET, FILM COATED ORAL at 08:32

## 2021-02-26 RX ADMIN — LOSARTAN POTASSIUM 50 MG: 50 TABLET, FILM COATED ORAL at 08:32

## 2021-02-26 RX ADMIN — LOSARTAN POTASSIUM 50 MG: 50 TABLET, FILM COATED ORAL at 21:00

## 2021-02-26 RX ADMIN — SEVELAMER CARBONATE 800 MG: 800 TABLET, FILM COATED ORAL at 08:32

## 2021-02-26 RX ADMIN — FAMOTIDINE 10 MG: 20 TABLET, FILM COATED ORAL at 08:32

## 2021-02-26 RX ADMIN — Medication 10 ML: at 21:00

## 2021-02-26 RX ADMIN — IPRATROPIUM BROMIDE AND ALBUTEROL SULFATE 1 AMPULE: .5; 3 SOLUTION RESPIRATORY (INHALATION) at 06:12

## 2021-02-26 RX ADMIN — IPRATROPIUM BROMIDE AND ALBUTEROL SULFATE 1 AMPULE: .5; 3 SOLUTION RESPIRATORY (INHALATION) at 18:34

## 2021-02-26 RX ADMIN — CARVEDILOL 12.5 MG: 12.5 TABLET, FILM COATED ORAL at 17:03

## 2021-02-26 RX ADMIN — IPRATROPIUM BROMIDE AND ALBUTEROL SULFATE 1 AMPULE: .5; 3 SOLUTION RESPIRATORY (INHALATION) at 10:46

## 2021-02-26 RX ADMIN — MEROPENEM 500 MG: 500 INJECTION, POWDER, FOR SOLUTION INTRAVENOUS at 18:16

## 2021-02-26 RX ADMIN — INSULIN LISPRO 1 UNITS: 100 INJECTION, SOLUTION INTRAVENOUS; SUBCUTANEOUS at 21:00

## 2021-02-26 RX ADMIN — HEPARIN SODIUM 5000 UNITS: 5000 INJECTION INTRAVENOUS; SUBCUTANEOUS at 21:00

## 2021-02-26 RX ADMIN — HEPARIN SODIUM 5000 UNITS: 5000 INJECTION INTRAVENOUS; SUBCUTANEOUS at 17:03

## 2021-02-26 RX ADMIN — Medication 81 MG: at 08:32

## 2021-02-26 RX ADMIN — HEPARIN SODIUM 5000 UNITS: 5000 INJECTION INTRAVENOUS; SUBCUTANEOUS at 04:59

## 2021-02-26 NOTE — PLAN OF CARE
Problem: Falls - Risk of:  Goal: Will remain free from falls  Description: Will remain free from falls  2/26/2021 0328 by Chava Watkins RN  Outcome: Ongoing  2/25/2021 1623 by Brandon Villa RN  Outcome: Ongoing  Goal: Absence of physical injury  Description: Absence of physical injury  2/26/2021 0328 by Chava Watkins RN  Outcome: Ongoing  2/25/2021 1623 by Brandon Villa RN  Outcome: Ongoing

## 2021-02-26 NOTE — PROGRESS NOTES
Physical Therapy  Name: Melanie Roblero  MRN:  [id number]  Date of service:  2/26/2021 02/26/21 1400   Subjective   Subjective Pt ready to go back to bed. Bed Mobility   Sit to Supine Contact guard assistance;Minimal assistance   Transfers   Sit to Stand Contact guard assistance   Stand to sit Contact guard assistance   Ambulation   Ambulation? Yes   Ambulation 1   Surface level tile   Device Rolling Walker   Assistance Contact guard assistance   Gait Deviations Slow Robyn;Decreased step length;Decreased step height   Distance 5'   Comments vc's required for each step taken   Short term goals   Time Frame for Short term goals 2 wks   Short term goal 1 SUP<>SIT, SBA   Short term goal 2 SIT<>STAND, CGA   Short term goal 3 STAND STEP TF'S WITH RW AND CGA   Short term goal 4 AMB 50 FT WITH RW, CGA   Conditions Requiring Skilled Therapeutic Intervention   Body structures, Functions, Activity limitations Decreased functional mobility ; Decreased endurance;Decreased strength;Decreased safe awareness;Decreased cognition   Assessment Pt back to bed with all needs within reach. Activity Tolerance   Activity Tolerance Patient limited by endurance; Patient limited by cognitive status   Safety Devices   Type of devices Left in bed;Call light within reach; Bed alarm in place       Electronically signed by Rosario Priest PTA on 2/26/2021 at 2:02 PM

## 2021-02-26 NOTE — PROGRESS NOTES
Nephrology ([facility]) Progress Note    Patient:  Jan Godoy  YOB: 1960  Date of Service: 2/26/2021  MRN: [id number]   Acct: [de-identified]   Primary Care Physician: RAMONA Pittman  Advance Directive: Full Code  Admit Date: 2/19/2021       Hospital Day: 7  Referring Provider: Jazmine Coyle MD    Patient Seen, Chart, Consults notes, Labs, Radiology studies reviewed. Subjective:  Jan Godoy is a 56 y.o. female  whom we were consulted for end-stage renal disease. Patient goes to [facility] dialysis clinic on Tuesday Thursday Saturday however she has skipped treatment on Thursday afternoon. She presented to emergency room on Friday morning with increasing weakness, shortness of breath. She also reports some diarrhea. Incidental finding of the labs shows potassium was 6.5 mmol. She was also clinically volume overload. She has received emergent dialysis treatment. She has received routine hemodialysis treatment on 2/25. Today, patient is anxious for discharge. Had no new complaints.      Allergies:  Bupropion, Gabapentin, Sulfa antibiotics, Varenicline, Wellbutrin [bupropion hcl], Azithromycin, Levaquin [levofloxacin], and Penicillins    Medicines:  Current Facility-Administered Medications   Medication Dose Route Frequency Provider Last Rate Last Admin    carvedilol (COREG) tablet 12.5 mg  12.5 mg Oral BID  Jazmine Coyle MD   12.5 mg at 02/26/21 9192    losartan (COZAAR) tablet 50 mg  50 mg Oral BID Jazmine Coyle MD   50 mg at 02/26/21 0111    famotidine (PEPCID) tablet 10 mg  10 mg Oral Daily Jazmine Coyle MD   10 mg at 02/26/21 1184    darbepoetin angie-polysorbate (ARANESP) injection 60 mcg  60 mcg Subcutaneous Weekly Izabel Givens MD   60 mcg at 02/20/21 1247    meropenem (MERREM) 500 mg in sterile water 10 mL IV syringe  500 mg Intravenous Q24H Jazmine Coyle MD   500 mg at 02/25/21 4775  vancomycin (VANCOCIN) intermittent dosing (placeholder)   Other RX Placeholder Mason Bee MD        oxyCODONE-acetaminophen (PERCOCET) 5-325 MG per tablet 1 tablet  1 tablet Oral Q6H PRN Mason Bee MD   1 tablet at 02/23/21 1230    ipratropium-albuterol (DUONEB) nebulizer solution 1 ampule  1 ampule Inhalation Q4H WA Mason Bee MD   1 ampule at 02/26/21 0612    aspirin EC tablet 81 mg  81 mg Oral Daily Mason Bee MD   81 mg at 02/26/21 0832    albuterol (PROVENTIL) nebulizer solution 2.5 mg  2.5 mg Nebulization Q6H PRN Mason Bee MD        sevelamer (RENVELA) tablet 800 mg  800 mg Oral Daily Mason Bee MD   800 mg at 02/26/21 0832    glucose (GLUTOSE) 40 % oral gel 15 g  15 g Oral PRN Mason Bee MD        dextrose 50 % IV solution  12.5 g Intravenous PRN Mason Bee MD   12.5 g at 02/19/21 2110    glucagon (rDNA) injection 1 mg  1 mg Intramuscular PRN Mason Bee MD        dextrose 5 % solution  100 mL/hr Intravenous PRN Mason Bee MD 50 mL/hr at 02/20/21 0054 50 mL/hr at 02/20/21 0054    potassium chloride (KLOR-CON M) extended release tablet 40 mEq  40 mEq Oral PRN Mason Bee MD        Or    potassium bicarb-citric acid (EFFER-K) effervescent tablet 40 mEq  40 mEq Oral PRZEHRA Bee MD        Or    potassium chloride 10 mEq/100 mL IVPB (Peripheral Line)  10 mEq Intravenous PRZEHRA Bee MD        promethazine (PHENERGAN) tablet 12.5 mg  12.5 mg Oral Q6H PRN Mason Bee MD        Or    ondansetron [facility]) injection 4 mg  4 mg Intravenous Q6H PRN Mason Bee MD   4 mg at 02/21/21 0224    magnesium hydroxide (MILK OF MAGNESIA) 400 MG/5ML suspension 30 mL  30 mL Oral Daily PRZEHRA Bee MD        insulin lispro (HUMALOG) injection vial 0-6 Units  0-6 Units Subcutaneous TID JOHANA Bee MD   1 Units at 02/24/21 1256  insulin lispro (HUMALOG) injection vial 0-3 Units  0-3 Units Subcutaneous Nightly Job Shabazz MD   1 Units at 02/25/21 2017    heparin (porcine) injection 5,000 Units  5,000 Units Subcutaneous 3 times per day Job Shabazz MD   5,000 Units at 02/26/21 0459    sodium chloride flush 0.9 % injection 10 mL  10 mL Intravenous 2 times per day Job Shabazz MD   10 mL at 02/25/21 1246    sodium chloride flush 0.9 % injection 10 mL  10 mL Intravenous PRN Job Shabazz MD           Past Medical History:  Past Medical History:   Diagnosis Date    Blind right eye     partial vision loss in the L eye too, due to DM    Blindness of one eye     Right     [facility]est[facility] failure[facility]     CKD (chronic kidney disease), stage IV (HCC)     secondary to diabetic nephropathy    Diabetes ([facility]     Diabetes mellitus with ophthalmic manifestations     dx'd in 2000 but likely ongoing before that, was dx'd when she went blind in the R eye    Glaucoma     Hemodialysis patient ([facility] [address]     dialysis tues, thurs, sat. [facility]    Hypertension     2000, dx'd at same time as the DM    Obesity     Renal osteodystrophy     Smoker     Type II or unspecified type diabetes mellitus with renal manifestations, uncontrolled(250.42)        Past Surgical History:  Past Surgical History:   Procedure Laterality Date    CARPAL TUNNEL RELEASE      CATARACT REMOVAL     [address] [place], and 1979    CHOLECYSTECTOMY      COLONOSCOPY N/A 3/9/2018    Dr ReevesQceuvn-Ajmcrtiriwuvkk-Aofnhsgqnenke AP (-) dysplasia x 1, tubular AP x  (-) dysplasia x 1, HP x 4--1 yr recall    DIALYSIS FISTULA CREATION Left 4/23/15 [facility]    Left proximal ulnar artery to cephalic vein AVF creation    EYE SURGERY      laser, several times     TYMPANOSTOMY TUBE PLACEMENT Bilateral     VASCULAR SURGERY Left 5/11/15 [facility] US-guided cannulation left distal upper arm cephalic vein with 4-Irish glide sheath; LUE AV f'grams with venography SVC; left cephalic vein BAM with [id number] julieth balloon; completion venogram [facility]    VASCULAR SURGERY Left 5/28/15 S    Percutaneous cannulation left distal radial artery with 4-Irish glide sheath; LUE AV f'grams with venography SVC; left cephalic vein balloon a'plasty with [id number] julieth balloon and [id number] julieth balloon; proximal and mid upper arm cephalic vein BAM with [id number] julieth balloon; completion venogram [facility]    VASCULAR SURGERY Left 6/15/15 S    US-guided cannulation left cephalic vein with 4-Irish and later 7-Irish sheath; LUE AV f'grams including venography SVC; left cephalic vein stenosis balloon a'plasty with [id number] cutting balloon; completion f'gram and venogram [facility]    VASCULAR SURGERY  05/15/2017    S. Left upper fistulograms/venograms.  VASCULAR SURGERY  02/14/2018    [facility]. Left upper fistulograms, left subclavian BA 12x40 atlas, left cephalic arch BA 67H37 conquest.    VASCULAR SURGERY  02/13/2019    S. Left upper fistulograms,BA left SCV 8x40 conquest,stent left SCV, viabahn 13x50,left SCV stent BA 12x40 conquest.       Family History  Family History   Problem Relation Age of Onset    Heart Disease Mother     Heart Attack Mother     Glaucoma Sister     Diabetes Sister     Diabetes Brother     Kidney Disease Brother     Blindness Brother     Stroke Maternal Grandmother     Stroke Maternal Grandfather     Colon Cancer Neg Hx     Colon Polyps Neg Hx     Liver Cancer Neg Hx     Liver Disease Neg Hx     Esophageal Cancer Neg Hx     Rectal Cancer Neg Hx     Stomach Cancer Neg Hx        Social History  Social History     Socioeconomic History    Marital status:      Spouse name: Not on file    Number of children: Not on file    Years of education: Not on file    Highest education level: Not on file   Occupational History  Not on file   Social Needs    Financial resource strain: Not on file    Food insecurity     Worry: Not on file     Inability: Not on file    Transportation needs     Medical: Not on file     Non-medical: Not on file   Tobacco Use    Smoking status: Current Every Day Smoker     Packs/day: 1.00     Years: 40.00     Pack years: 40.00    Smokeless tobacco: Never Used   Substance and Sexual Activity    Alcohol use: No     Alcohol/week: 0.0 standard drinks    Drug use: Yes     Types: Marijuana    Sexual activity: Not Currently     Partners: Male   Lifestyle    Physical activity     Days per week: Not on file     Minutes per session: Not on file    Stress: Not on file   Relationships    Social connections     Talks on phone: Not on file     Gets together: Not on file     Attends Jewish service: Not on file     Active member of club or organization: Not on file     Attends meetings of clubs or organizations: Not on file     Relationship status: Not on file    Intimate partner violence     Fear of current or ex partner: Not on file     Emotionally abused: Not on file     Physically abused: Not on file     Forced sexual activity: Not on file   Other Topics Concern    Not on file   Social History Narrative    Not on file         Review of Systems:  History obtained from chart review and the patient  General ROS: No fever or chills  Respiratory ROS: No cough, shortness of breath, wheezing  Cardiovascular ROS: no chest pain or dyspnea on exertion  Gastrointestinal ROS: No abdominal pain or melena  Genito-Urinary ROS: No dysuria or hematuria  Musculoskeletal ROS: No joint pain or swelling         Objective:  Blood pressure (!) 182/89, pulse 92, temperature 98.5 °F (36.9 °C), temperature source Temporal, resp. rate 16, height 4' 11\" (1.499 m), weight 131 lb 9.6 oz (59.7 kg), SpO2 95 %.     Intake/Output Summary (Last 24 hours) at 2/26/2021 0934  Last data filed at 2/25/2021 2206  Gross per 24 hour Intake 250 ml   Output 2300 ml   Net -2050 ml     General: alert, awake  Chest:  clear to auscultation bilaterally without respiratory distress  CVS: regular rate and rhythm  Abdominal: soft, nontender, normal bowel sounds  Extremities: no cyanosis or edema  Skin: warm and dry without rash    Labs:  BMP:   Recent Labs     02/24/21 0236 02/25/21 0247 02/26/21  0151    137 142   K 3.8 4.1 3.8   CL 94* 93* 96*   CO2 26 27 30*   PHOS 3.5  --   --    BUN 22 34* 19   CREATININE 3.6* 5.0* 3.5*   CALCIUM 9.6 10.0 9.5     CBC:   Recent Labs     02/24/21 0236 02/25/21 0247 02/26/21  0151   WBC 3.7* 4.2* 4.3*   HGB 10.9* 10.3* 9.7*   HCT 33.9* 33.5* 30.2*   MCV 97.4 100.0* 97.1   * 116* 111*     LIVER PROFILE:   No results for input(s): AST, ALT, LIPASE, BILIDIR, BILITOT, ALKPHOS in the last 72 hours. Invalid input(s): AMYLASE,  ALB  PT/INR: No results for input(s): PROTIME, INR in the last 72 hours. APTT: No results for input(s): APTT in the last 72 hours. BNP:  No results for input(s): BNP in the last 72 hours. Ionized Calcium:No results for input(s): IONCA in the last 72 hours. Magnesium:No results for input(s): MG in the last 72 hours. Phosphorus:  Recent Labs     02/24/21 0236   PHOS 3.5     HgbA1C: No results for input(s): LABA1C in the last 72 hours. Hepatic:   No results for input(s): ALKPHOS, ALT, AST, PROT, BILITOT, BILIDIR, LABALBU in the last 72 hours. Lactic Acid: No results for input(s): LACTA in the last 72 hours. Troponin: No results for input(s): CKTOTAL, CKMB, TROPONINT in the last 72 hours. ABGs: No results for input(s): PH, PCO2, PO2, HCO3, O2SAT in the last 72 hours. CRP:  No results for input(s): CRP in the last 72 hours. Sed Rate:  No results for input(s): SEDRATE in the last 72 hours. Cultures:   No results for input(s): CULTURE in the last 72 hours.     Radiology reports as per the Radiologist  Radiology: Ct Head Wo Contrast    Result Date: 2/19/2021 Examination. CT HEAD WO CONTRAST 2/19/2021 12:55 PM History: Altered mental status. DLP: 1305 mGycm. The CT scan of the head is performed without intravenous contrast enhancement. The images are acquired in axial plane and subsequent reconstruction in coronal and sagittal planes. The comparison is made with the previous study dated 9/1/2020. There is no evidence of mass. No midline shift. There is no evidence of an intracranial hemorrhage or hematoma. Moderately dilated ventricles, basal cistern and the cortical sulci are similar to the previous study suggesting a chronic volume loss. A few scattered areas of chronic white matter ischemia in the centrum semiovale bilaterally are stable. The gray-white matter differentiation is maintained. A partially empty sella turcica is seen. The images reviewed in bone window show no acute bony abnormality. The visualized paranasal sinuses and mastoid air cells are clear. A curvilinear metallic density in the lateral margin of the left globe/orbit is similar to the previous study. No acute intracranial abnormality.  Signed by Dr Lilo Villalobos on 2/19/2021 2:38 PM    Ct Chest W Contrast    Result Date: 2/19/2021 EXAM: CT CHEST W CONTRAST -- 2/19/2021 12:55 PM HISTORY: 56 years, Female, abnormal chest x-ray COMPARISON: 2/19/2021 DLP: 769.5 mGy cm. Automated exposure control was utilized to minimize patient radiation dose. TECHNIQUE: Enhanced  CT images of the chest obtained with multiplanar reformats. FINDINGS: AIRWAYS/PULMONARY PARENCHYMA: The central airways are midline and patent. Expiratory phase of imaging. Small right pleural effusion with compressive enhancing breast atelectasis involving the right middle and lower lobes. Right upper lobe with a pleural-based 1.7 x 2.0 cm opacity, which could represent round atelectasis, scarring or nodule. VASCULATURE: Thoracic aorta is normal in course and caliber. Mild calcified aortic atherosclerosis. Normal pulmonary artery caliber. No large central pulmonary artery filling defect on this non-CTA exam. Left subclavian vein with short segment severe stenosis on axial image 29. This is just lateral to the stent. There are left chest wall collaterals suggesting that this is a chronic finding. CARDIAC:  Borderline heart size. No pericardial effusion. Scattered coronary artery calcifications. MEDIASTINUM: There is no mediastinal or hilar lymphadenopathy by CT size criteria. Esophagus has normal coarse, caliber and wall thickness. EXTRATHORACIC: The visualized portions of the thyroid gland are unremarkable. No thoracic inlet or axillary adenopathy. Body wall edema. Left chest wall collaterals. INCLUDED UPPER ABDOMEN: The liver appears prominent low density, which could be due to fatty infiltration or phase of contrast. Cholecystectomy clips. No bile duct dilation. Small ascites. There is wedge-shaped low-density in the spleen, concerning for age-indeterminate splenic infarct. Visualized portion of the pancreas and adrenal glands appear within normal limits. Atrophic kidneys partially visualized. OSSEOUS: Mild degenerative changes in the spine. No acute or aggressive bony finding. EXAM: CT ABDOMEN PELVIS W IV CONTRAST -- 2/19/2021 12:55 PM HISTORY: 56 years, Female, markedly elevated lipase, abdominal pain, diarrhea, ESRD on hemodialysis COMPARISON: No existing relevant imaging studies available DLP: 769.5 mGy cm. Automated exposure control was utilized to minimize patient radiation dose. TECHNIQUE: Enhanced CT images obtained of the abdomen and pelvis with multiplanar reformats. FINDINGS: LUNG BASES: Right pleural effusion with compressive atelectasis. Underlying inferior heart size with scattered coronary artery calcifications. LIVER: Diffuse low-density of the liver concerning for fatty liver. Liver appears mildly enlarged. Patent portal and hepatic veins. GALLBLADDER and BILARY: Cholecystectomy clips. No bile duct dilation. PANCREAS: Normal enhancement of the pancreas. There is ascites and mesenteric edema throughout the abdomen, which limited evaluation of the peripancreatic fat. SPLEEN: There is a wedge-shaped hypodensity in the spleen concerning for infarct. Splenic vein appears grossly patent. Normal caliber splenic artery. ADRENALS: No adrenal mass. URINARY: Atrophic kidneys. No hydronephrosis. Renal vascular calcifications. No convincing nephrolithiasis. Urinary bladder is underdistended, which limits evaluation. Grossly normal CT appearance of the uterus. Adnexa are not discretely identified. PERITONEUM: No ascites/free fluid. No pneumoperitoneum. BOWEL: Stomach and duodenum have normal course and caliber. No abnormally dilated loops of bowel or bowel wall thickening. Normal short appendix or appendiceal stump on axial images 61-57. RETROPERITONEUM:  Aorta normal in course and caliber with calcified atherosclerosis. Celiac and superior mesenteric arteries patent. Bilateral renal arteries diminutive. Inferior mesenteric artery opacified.  Heavily calcified iliac arteries, limited in evaluation on this non-CTA exam. Scattered retroperitoneal lymph nodes, not enlarged by CT size criteria, which may be reactive. ABDOMINAL WALL: Significant body wall edema. Prominent vessel in the partially visualized left distal upper arm, possibly fistula. OSSEOUS: Subacute to chronic right superior pubic ramus, right inferior pubic ramus and left inferior pubic rami fractures. 1. Ascites and mesenteric edema Limited evaluation of the peripancreatic fat. Normal enhancement of the pancreas. 2. Wedge-shaped hypodensity in the spleen concerning for age-indeterminate infarct. Splenic vein and artery appear patent and normal caliber. 3. Enlarged low-density liver concerning for fatty liver. 4. Right pleural effusion, ascites, body wall edema suggests fluid overload. 5. Heavily calcified atherosclerosis. 6. Subacute to chronic appearing pubic rami fractures. 7. See separately dictated CT chest of the same day. Signed by Dr Kamilla Pike on 2/19/2021 2:54 PM    Xr Chest Portable    Result Date: 2/19/2021  XR CHEST PORTABLE 2/19/2021 10:18 AM HISTORY: Shortness of breath COMPARISON: Chest x-ray dated 10/10/2020. FINDINGS: Mild cardiomegaly which is stable. Bilateral coarsened interstitial markings with large layering right pleural effusion. No pneumothorax. Vascular stent along the left brachiocephalic vein. No acute bony abnormality. 1. Interstitial edema with large layering right pleural effusion. Signed by Dr Eliana Cheung on 2/19/2021 11:44 AM       Assessment     1.  End-stage renal disease/maintenance dialysis. 2.  Type II diabetic nephropathy. 3.  Noncompliant dialysis patient. 4.  Anemia of chronic kidney disease. 5.  Hyperkalemia now resolved. 6.  Clinically volume overloaded  7.  Right-sided pleural effusion/large. 8.  Metabolic encephalopathy  9.  Pubic rami fractures/recent fall       Plan:  Dialysis is due in a.m. Follow-up labs. If patient is discharged, she will need to follow up at her outpatient dialysis unit.

## 2021-02-26 NOTE — CARE COORDINATION
The [address] at [facility]  Notification of Admission Decision      [] Patient has been accepted for admit to [facility] on :       Please write discharge orders and summary prior to discharge. [] Patient acceptance to Rehab pending the following :    [] Eval in progress       [x] Patient determined to be ineligible for services at [facility] because : Attempted to talk with patient about Rehab. Patient just kept telling me she wanted to go home alone with [facility]. Discussed HH with patient, she is in agreement to that. Notified Erica Hicks CM. We recommend you consider : HH       Thank you for your referral, we appreciate you.     Electronically Signed by Chanel Cortes, Admissions Coordinator 2/26/2021 1:28 PM

## 2021-02-26 NOTE — PROGRESS NOTES
[facility]    Patient:  Tanvir Frederick  YOB: 1960  Date of Service: 2/26/2021  MRN: [id number]   Acct: [de-identified]   Primary Care Physician: RAMONA Hammer  Advance Directive: Full Code  Admit Date: 2/19/2021       Hospital Day: 7        CHIEF COMPLAINT:    Chief Complaint   Patient presents with    Diarrhea     pt missed dialysis on Thursday.  Failure To Thrive       2/26/2021 3:34 PM  Subjective / Interval History:   02/26/2021  Patient seen and examined. Alert only to self. More awake and alert this a.m. Denies any acute complaints or distress at this time. Endorses overall improvement. Patient appears to be having visible intermittent speech difficulty. Acuity unclear given patient has been extremely lethargic and confused throughout hospital course. Follow-up MRI brain without contrast rule out any underlying CVA  Cognitive eval  Consider neurology consult    02/25/2021  Patient seen and examined this AM.  Mental status improved. More awake and alert this a.m. Endorses overall improvement. No acute changes or acute overnight event reported. Patient laying comfortably in bed in no apparent acute distress. 02/24/2021  No acute changes or acute overnight event reported. Patient gradually improving. More awake and alert this a.m. Laying comfortably in bed in no acute distress. Denies any other acute complaints or distress at this time. 02/23/2021  Patient seen and examined. Doing well. No new complaints. No acute changes or acute overnight event reported. Mental status improved tolerating HD.    02/22/2021  Patient seen and examined this AM.  More awake and alert to the same. No new complaints. No acute changes or acute overnight events reported. Patient oriented to self. Continues to have some intermittent groaning. No specific localized pain reported or identified.     02/21/2021 Patient seen and examined this AM.  Reportedly agitated with continued moaning however with no specific complaint overnight. No changes in mental status. Patient more awake and alert this a.m. though. Laying comfortably in bed in no apparent acute distress. 02/20/2021  Patient seen and and examined. Continues to be confused with intermittent jerky movements in upper extremity. No other acute changes or acute overnight event reported. Tolerated HD yesterday, and currently receiving a second session of HD. Review of Systems:   Review of Systems   Unable to perform ROS: Mental status change     DIET CARB CONTROL;  Renal    Intake/Output Summary (Last 24 hours) at 2/26/2021 1534  Last data filed at 2/26/2021 1450  Gross per 24 hour   Intake 460 ml   Output 100 ml   Net 360 ml       Medications:   dextrose 50 mL/hr (02/20/21 0054)     Current Facility-Administered Medications   Medication Dose Route Frequency Provider Last Rate Last Admin    carvedilol (COREG) tablet 12.5 mg  12.5 mg Oral BID  Nay Azevedo MD   12.5 mg at 02/26/21 0970    losartan (COZAAR) tablet 50 mg  50 mg Oral BID Nay Azevedo MD   50 mg at 02/26/21 1369    famotidine (PEPCID) tablet 10 mg  10 mg Oral Daily Nay Azevedo MD   10 mg at 02/26/21 2879    darbepoetin angie-polysorbate (ARANESP) injection 60 mcg  60 mcg Subcutaneous Weekly Katherine Naik MD   60 mcg at 02/20/21 1247    meropenem (MERREM) 500 mg in sterile water 10 mL IV syringe  500 mg Intravenous Q24H Nay Azevedo MD   500 mg at 02/25/21 1447    vancomycin (VANCOCIN) intermittent dosing (placeholder)   Other RX Placeholder Nay Azevedo MD        oxyCODONE-acetaminophen (PERCOCET) 5-325 MG per tablet 1 tablet  1 tablet Oral Q6H PRN Nay Azevedo MD   1 tablet at 02/23/21 1230    ipratropium-albuterol (DUONEB) nebulizer solution 1 ampule  1 ampule Inhalation Q4H WA Nay Azevedo MD   1 ampule at 02/26/21 6786  aspirin EC tablet 81 mg  81 mg Oral Daily Travon Cali MD   81 mg at 02/26/21 6684    albuterol (PROVENTIL) nebulizer solution 2.5 mg  2.5 mg Nebulization Q6H PRN Travon Cali MD        sevelamer (RENVELA) tablet 800 mg  800 mg Oral Daily Travon Cali MD   800 mg at 02/26/21 0832    glucose (GLUTOSE) 40 % oral gel 15 g  15 g Oral PRN Travon Cali MD        dextrose 50 % IV solution  12.5 g Intravenous PRN Travon Cali MD   12.5 g at 02/19/21 2110    glucagon (rDNA) injection 1 mg  1 mg Intramuscular PRN Travon Cali MD        dextrose 5 % solution  100 mL/hr Intravenous PRN Travon Cali MD 50 mL/hr at 02/20/21 0054 50 mL/hr at 02/20/21 0054    potassium chloride (KLOR-CON M) extended release tablet 40 mEq  40 mEq Oral PRN Travon Cali MD        Or    potassium bicarb-citric acid (EFFER-K) effervescent tablet 40 mEq  40 mEq Oral PRN Travon Cali MD        Or    potassium chloride 10 mEq/100 mL IVPB (Peripheral Line)  10 mEq Intravenous PRN Travon Cali MD        promethazine (PHENERGAN) tablet 12.5 mg  12.5 mg Oral Q6H PRN Travon Cali MD        Or    ondansetron [facility]) injection 4 mg  4 mg Intravenous Q6H PRN Travon Cali MD   4 mg at 02/21/21 0224    magnesium hydroxide (MILK OF MAGNESIA) 400 MG/5ML suspension 30 mL  30 mL Oral Daily PRN Travon Cali MD        insulin lispro (HUMALOG) injection vial 0-6 Units  0-6 Units Subcutaneous TID WC Travon Cali MD   1 Units at 02/26/21 0835    insulin lispro (HUMALOG) injection vial 0-3 Units  0-3 Units Subcutaneous Nightly Travon Cali MD   1 Units at 02/25/21 2017    heparin (porcine) injection 5,000 Units  5,000 Units Subcutaneous 3 times per day Travon Cali MD   5,000 Units at 02/26/21 0459    sodium chloride flush 0.9 % injection 10 mL  10 mL Intravenous 2 times per day Travon Cali MD   10 mL at 02/25/21 2930  sodium chloride flush 0.9 % injection 10 mL  10 mL Intravenous PRN Taryn Xie MD             dextrose 50 mL/hr (02/20/21 0054)      carvedilol  12.5 mg Oral BID     losartan  50 mg Oral BID    famotidine  10 mg Oral Daily    darbepoetin angie-polysorbate  60 mcg Subcutaneous Weekly    meropenem (MERREM) 500 mg in SWFI 10 mL IV syringe  500 mg Intravenous Q24H    vancomycin (VANCOCIN) intermittent dosing (placeholder)   Other RX Placeholder    ipratropium-albuterol  1 ampule Inhalation Q4H WA    aspirin EC  81 mg Oral Daily    sevelamer  800 mg Oral Daily    insulin lispro  0-6 Units Subcutaneous TID     insulin lispro  0-3 Units Subcutaneous Nightly    heparin (porcine)  5,000 Units Subcutaneous 3 times per day    sodium chloride flush  10 mL Intravenous 2 times per day     oxyCODONE-acetaminophen, albuterol, glucose, dextrose, glucagon (rDNA), dextrose, potassium chloride **OR** potassium alternative oral replacement **OR** potassium chloride, promethazine **OR** ondansetron, magnesium hydroxide, sodium chloride flush  DIET CARB CONTROL;  Renal       Labs:   CBC with DIFF:  Recent Labs     02/24/21  0236 02/25/21  0247 02/26/21  0151   WBC 3.7* 4.2* 4.3*   RBC 3.48* 3.35* 3.11*   HGB 10.9* 10.3* 9.7*   HCT 33.9* 33.5* 30.2*   MCV 97.4 100.0* 97.1   MCH 31.3* 30.7 31.2*   MCHC 32.2* 30.7* 32.1*   RDW 17.1* 17.4* 17.1*   * 116* 111*   MPV 9.7 9.9 9.9   NEUTOPHILPCT 68.2* 70.5* 71.4*   LYMPHOPCT 13.1* 12.7* 12.6*   MONOPCT 15.0* 13.7* 13.7*   EOSRELPCT 2.9 2.4 1.9   BASOPCT 0.3 0.2 0.2   NEUTROABS 2.6 3.0 3.1   LYMPHSABS 0.5* 0.5* 0.5*   MONOSABS 0.60 0.60 0.60   EOSABS 0.10 0.10 0.10   BASOSABS 0.00 0.00 0.00       CMP/BMP:  Recent Labs     02/24/21  0236 02/25/21  0247 02/26/21  0151    137 142   K 3.8 4.1 3.8   CL 94* 93* 96*   CO2 26 27 30*   ANIONGAP 17 17 16   GLUCOSE 115* 197* 153*   BUN 22 34* 19   CREATININE 3.6* 5.0* 3.5*   LABGLOM 13* 9* 13*   CALCIUM 9.6 10.0 9.5 CRP:    No results for input(s): CRP in the last 72 hours. Sed Rate:    No results for input(s): SEDRATE in the last 72 hours. HgBA1c:  No components found for: HGBA1C  FLP:    Lab Results   Component Value Date    TRIG 65 02/19/2021    HDL 61 06/09/2020    LDLCALC 25 06/09/2020    LDLDIRECT 120 07/28/2015     TSH:  No results found for: TSH  Troponin T: No results for input(s): TROPONINI in the last 72 hours. Pro-BNP: No results for input(s): BNP in the last 72 hours. INR:   No results for input(s): INR in the last 72 hours. ABGs:   Lab Results   Component Value Date    PHART 7.340 02/19/2021    PO2ART 72.0 02/19/2021    HXT2XHT 46.0 02/19/2021     UA:No results for input(s): NITRITE, COLORU, PHUR, LABCAST, WBCUA, RBCUA, MUCUS, TRICHOMONAS, YEAST, BACTERIA, CLARITYU, SPECGRAV, LEUKOCYTESUR, UROBILINOGEN, BILIRUBINUR, BLOODU, GLUCOSEU, AMORPHOUS in the last 72 hours. Invalid input(s): Magda Phan      Culture Results:    No results for input(s): CXSURG in the last 720 hours. Blood Culture Recent:   Recent Labs     02/19/21  1154   BC No growth after 5 days of incubation. Cultures:   No results for input(s): CULTURE in the last 72 hours. No results for input(s): BC, Tom Amass in the last 72 hours. No results for input(s): CXSURG in the last 72 hours. Recent Labs     02/24/21  0236   PHOS 3.5     No results for input(s): AST, ALT, ALB, BILITOT, ALKPHOS, ALB in the last 72 hours.       RAD:   Ct Head Wo Contrast    Result Date: 2/19/2021 Examination. CT HEAD WO CONTRAST 2/19/2021 12:55 PM History: Altered mental status. DLP: 1305 mGycm. The CT scan of the head is performed without intravenous contrast enhancement. The images are acquired in axial plane and subsequent reconstruction in coronal and sagittal planes. The comparison is made with the previous study dated 9/1/2020. There is no evidence of mass. No midline shift. There is no evidence of an intracranial hemorrhage or hematoma. Moderately dilated ventricles, basal cistern and the cortical sulci are similar to the previous study suggesting a chronic volume loss. A few scattered areas of chronic white matter ischemia in the centrum semiovale bilaterally are stable. The gray-white matter differentiation is maintained. A partially empty sella turcica is seen. The images reviewed in bone window show no acute bony abnormality. The visualized paranasal sinuses and mastoid air cells are clear. A curvilinear metallic density in the lateral margin of the left globe/orbit is similar to the previous study. No acute intracranial abnormality.  Signed by Dr Jonah Bose on 2/19/2021 2:38 PM    Ct Chest W Contrast    Result Date: 2/19/2021 EXAM: CT CHEST W CONTRAST -- 2/19/2021 12:55 PM HISTORY: 56 years, Female, abnormal chest x-ray COMPARISON: 2/19/2021 DLP: 769.5 mGy cm. Automated exposure control was utilized to minimize patient radiation dose. TECHNIQUE: Enhanced  CT images of the chest obtained with multiplanar reformats. FINDINGS: AIRWAYS/PULMONARY PARENCHYMA: The central airways are midline and patent. Expiratory phase of imaging. Small right pleural effusion with compressive enhancing breast atelectasis involving the right middle and lower lobes. Right upper lobe with a pleural-based 1.7 x 2.0 cm opacity, which could represent round atelectasis, scarring or nodule. VASCULATURE: Thoracic aorta is normal in course and caliber. Mild calcified aortic atherosclerosis. Normal pulmonary artery caliber. No large central pulmonary artery filling defect on this non-CTA exam. Left subclavian vein with short segment severe stenosis on axial image 29. This is just lateral to the stent. There are left chest wall collaterals suggesting that this is a chronic finding. CARDIAC:  Borderline heart size. No pericardial effusion. Scattered coronary artery calcifications. MEDIASTINUM: There is no mediastinal or hilar lymphadenopathy by CT size criteria. Esophagus has normal coarse, caliber and wall thickness. EXTRATHORACIC: The visualized portions of the thyroid gland are unremarkable. No thoracic inlet or axillary adenopathy. Body wall edema. Left chest wall collaterals. INCLUDED UPPER ABDOMEN: The liver appears prominent low density, which could be due to fatty infiltration or phase of contrast. Cholecystectomy clips. No bile duct dilation. Small ascites. There is wedge-shaped low-density in the spleen, concerning for age-indeterminate splenic infarct. Visualized portion of the pancreas and adrenal glands appear within normal limits. Atrophic kidneys partially visualized. OSSEOUS: Mild degenerative changes in the spine. No acute or aggressive bony finding. 1. Right pleural effusion with compressive atelectasis of the right middle and lower lobes. 2. Right upper lobe with a pleural-based 1.7 x 2.0 cm opacity, which could represent round atelectasis, scarring or nodule. Recommend follow-up CT chest in 3 months per Fleischner criteria. 3. Left subclavian vein with a short segment severe stenosis, just lateral to the stent. Left chest wall collaterals suggest chronicity. 4. Borderline heart size with scattered coronary artery calcifications. 5. Wedge-shaped low-density in the spleen concerning for age-indeterminate splenic infarct. 6. Low-density of the liver which could be seen with fatty infiltration or exaggerated due to phase of contrast. Small volume ascites.  Signed by Dr Roshan Devries on 2/19/2021 2:42 PM    Ct Abdomen Pelvis W Iv Contrast Additional Contrast? None    Result Date: 2/19/2021 EXAM: CT ABDOMEN PELVIS W IV CONTRAST -- 2/19/2021 12:55 PM HISTORY: 56 years, Female, markedly elevated lipase, abdominal pain, diarrhea, ESRD on hemodialysis COMPARISON: No existing relevant imaging studies available DLP: 769.5 mGy cm. Automated exposure control was utilized to minimize patient radiation dose. TECHNIQUE: Enhanced CT images obtained of the abdomen and pelvis with multiplanar reformats. FINDINGS: LUNG BASES: Right pleural effusion with compressive atelectasis. Underlying inferior heart size with scattered coronary artery calcifications. LIVER: Diffuse low-density of the liver concerning for fatty liver. Liver appears mildly enlarged. Patent portal and hepatic veins. GALLBLADDER and BILARY: Cholecystectomy clips. No bile duct dilation. PANCREAS: Normal enhancement of the pancreas. There is ascites and mesenteric edema throughout the abdomen, which limited evaluation of the peripancreatic fat. SPLEEN: There is a wedge-shaped hypodensity in the spleen concerning for infarct. Splenic vein appears grossly patent. Normal caliber splenic artery. ADRENALS: No adrenal mass. URINARY: Atrophic kidneys. No hydronephrosis. Renal vascular calcifications. No convincing nephrolithiasis. Urinary bladder is underdistended, which limits evaluation. Grossly normal CT appearance of the uterus. Adnexa are not discretely identified. PERITONEUM: No ascites/free fluid. No pneumoperitoneum. BOWEL: Stomach and duodenum have normal course and caliber. No abnormally dilated loops of bowel or bowel wall thickening. Normal short appendix or appendiceal stump on axial images 61-57. RETROPERITONEUM:  Aorta normal in course and caliber with calcified atherosclerosis. Celiac and superior mesenteric arteries patent. Bilateral renal arteries diminutive. Inferior mesenteric artery opacified.  Heavily calcified iliac arteries, limited in evaluation on this non-CTA exam. Scattered retroperitoneal lymph nodes, not enlarged by CT size criteria, which may be reactive. ABDOMINAL WALL: Significant body wall edema. Prominent vessel in the partially visualized left distal upper arm, possibly fistula. OSSEOUS: Subacute to chronic right superior pubic ramus, right inferior pubic ramus and left inferior pubic rami fractures. 1. Ascites and mesenteric edema Limited evaluation of the peripancreatic fat. Normal enhancement of the pancreas. 2. Wedge-shaped hypodensity in the spleen concerning for age-indeterminate infarct. Splenic vein and artery appear patent and normal caliber. 3. Enlarged low-density liver concerning for fatty liver. 4. Right pleural effusion, ascites, body wall edema suggests fluid overload. 5. Heavily calcified atherosclerosis. 6. Subacute to chronic appearing pubic rami fractures. 7. See separately dictated CT chest of the same day. Signed by Dr Fauzia Almanza on 2/19/2021 2:54 PM    Xr Chest Portable    Result Date: 2/19/2021  XR CHEST PORTABLE 2/19/2021 10:18 AM HISTORY: Shortness of breath COMPARISON: Chest x-ray dated 10/10/2020. FINDINGS: Mild cardiomegaly which is stable. Bilateral coarsened interstitial markings with large layering right pleural effusion. No pneumothorax. Vascular stent along the left brachiocephalic vein. No acute bony abnormality. 1. Interstitial edema with large layering right pleural effusion. Signed by Dr Merlinda Allan on 2/19/2021 11:44 AM      Recent 2D ECHOCARDIOGRAM  05/08/2020    Summary   LV is normal in size with normal systolic function. LV ejection fraction   estimated at 50-60%.  Mild concentric LVH. Probable grade 2 diastolic dysfunction.   RV is normal in size with normal systolic function.   Mild to moderate left atrial enlargement.   Normal right atrial size.   Aortic valve is trileaflet with mild leaflet thickening. Normal opening.   No stenosis or regurgitation noted.   Mitral valve is mild to moderately thickened with normal leaflet mobility.  Likely Severe (3-4+) mitral regurgitation is present. No stenosis.   Mild tricuspid regurgitation.  RVSP estimated at 50 mmHg.      Signature    ----------------------------------------------------------------   Electronically signed by Vijaya Oliver MD(Interpreting physician)   on 05/08/2020 07:30 PM   ----------------------------------------------------------------        Objective:   Vitals:   /70   Pulse 74   Temp 97.9 °F (36.6 °C) (Temporal)   Resp 20   Ht 4' 11\" (1.499 m)   Wt 131 lb 9.6 oz (59.7 kg)   SpO2 94%   BMI 26.58 kg/m²     Patient Vitals for the past 24 hrs:   BP Temp Temp src Pulse Resp SpO2 Weight   02/26/21 1129 122/70 97.9 °F (36.6 °C) Temporal 74 20 94 %    02/26/21 0726 (!) 182/89 98.5 °F (36.9 °C) Temporal 92 16 95 %    02/26/21 0459       131 lb 9.6 oz (59.7 kg)   02/26/21 0023 (!) 159/78 98.3 °F (36.8 °C) Temporal 94 16 94 % 131 lb 9.6 oz (59.7 kg)   02/25/21 1912 (!) 158/72 99.6 °F (37.6 °C) Temporal 97 16 98 %    02/25/21 1858     18 98 %        24HR INTAKE/OUTPUT:      Intake/Output Summary (Last 24 hours) at 2/26/2021 1534  Last data filed at 2/26/2021 1450  Gross per 24 hour   Intake 460 ml   Output 100 ml   Net 360 ml       Physical Exam  Vitals signs and nursing note reviewed. Constitutional:       General: She is not in acute distress. Appearance: Normal appearance. She is ill-appearing. She is not toxic-appearing or diaphoretic. HENT:      Head: Normocephalic and atraumatic. Right Ear: External ear normal.      Left Ear: External ear normal.      Nose: Nose normal. No congestion or rhinorrhea. Eyes:      General: No scleral icterus. Right eye: No discharge. Left eye: No discharge. Extraocular Movements: Extraocular movements intact. Conjunctiva/sclera: Conjunctivae normal.   Neck:      Musculoskeletal: Normal range of motion and neck supple. No neck rigidity or muscular tenderness. Vascular: No carotid bruit. Cardiovascular:      Rate and Rhythm: Normal rate and regular rhythm. Pulses: Normal pulses. Heart sounds: Normal heart sounds. No friction rub. No gallop. Pulmonary:      Effort: Pulmonary effort is normal. No respiratory distress. Breath sounds: Normal breath sounds. No stridor. No wheezing, rhonchi or rales. Chest:      Chest wall: No tenderness. Abdominal:      General: Bowel sounds are normal. There is no distension. Palpations: Abdomen is soft. Tenderness: There is no abdominal tenderness. There is no guarding or rebound. Comments: Diffuse tenderness to palpation. No guarding, rigidity, rebound tenderness noted/elicited   Musculoskeletal: Normal range of motion. General: No swelling, tenderness, deformity or signs of injury. Right lower leg: Edema present. Left lower leg: Edema present. Skin:     General: Skin is warm and dry. Capillary Refill: Capillary refill takes less than 2 seconds. Coloration: Skin is not jaundiced or pale. Findings: No bruising, erythema, lesion or rash. Neurological:      General: No focal deficit present. Mental Status: She is alert. She is disoriented. Cranial Nerves: No cranial nerve deficit. Psychiatric:         Mood and Affect: Mood normal.      Comments: Unable to assess judgment and thought content.            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 *        Brief Summary  Ms. Frederick Lamb a 56 y.o. female with a history of DMT2, ESRD, on HD, CHF, presenting to [address] [place] [address] ED (2021) on account of diarrhea, with an associated abdominal/back pain, generalized weakness and lethargy.     Patient reportedly has missed 1 session of HD, due to the symptoms noted above.     Patient reportedly is on baseline home oxygen.     Initial work-up significant for;  AB.3 // 4.8  Hyperkalemia, with a potassium of 5.3  Initial BUN/creatinine of 77/7.2  Hypoglycemia with initial glucose of 67  Leukopenia, with WBC of 3.7  Lactic acid within lab reference range: 0.8  Markedly elevated lipase: 1,492  Mildly elevated ESR: 30 mm/hr  Elevated CRP: 7.45  Molecular respiratory panel with COVID-19 grossly negative  Patient admitted for further work-up and management.     Generalized weakness  Diarrhea  Altered mental status  Transaminitis  · Initial work-up (2021) significant for;  · AB.3 / 4.8  · Leukopenia, with WBC of 3.7  · Lactic acid within lab reference range: 0.8  · Markedly elevated lipase: 1,492 (2021)  · Mildly elevated ESR: 30 mm/hr  · Elevated CRP: 7.45  · Mildly elevated ALT/AST: 55/54  · Elevated alkaline phosphatase: 353  · Molecular respiratory panel with COVID-19 grossly negative -2021  · Lipase level improved  · Blood cultures no growth to date  · Mildly elevated Procalcitonin level: 0.24 (2021)  · Elevated CRP: 7.45 [0.00 - 0.50 mg/dL]  · Mildly elevated ESR  · Elevated GGT  · Elevated serum ferritin · Triglyceride level within lab reference range  · Ammonia level  · Acute viral hepatitis panel grossly negative  · HIV rapid 1 & 2 screen negative  · Acetaminophen level within lab reference range  · Ethanol level < 10  Mg/dL  · Elevated GGT level  · LDH within lab reference range  · Stool for C. difficile antigen toxin as well as ova and parasites, however patient with no further diarrhea. · Empiric antibiotic (Vancomycin and Meropenem),   · Blood cultures no growth to date  · Follow-up MRSA nares screening  · Consider de-escalating antibiotics  · CT head without contrast, with no acute intracranial abnormality. · CT chest with contrast (02/19/2021): Impression: Right pleural effusion with compressive atelectasis of the right middle and lower lobes. . Right upper lobe with a pleural-based 1.7 x 2.0 cm opacity, which could represent round atelectasis, scarring or nodule. Recommend follow-up CT chest in 3 months per Fleischner criteria. Left subclavian vein with a short segment severe stenosis, just lateral to the stent. Left chest wall collaterals suggest chronicity. . Borderline heart size with scattered coronary artery calcifications. . Wedge-shaped low-density in the spleen concerning for age-indeterminate splenic infarct. Low-density of the liver which could be seen with fatty infiltration or exaggerated due to phase of contrast. Small volume ascites. · CT abdomen pelvis with IV contrast (02/19/2021): Impression: Ascites and mesenteric edema Limited evaluation of the peripancreatic fat. Normal enhancement of the pancreas. Wedge-shaped hypodensity in the spleen concerning for age-indeterminate infarct. Splenic vein and artery appear patent andmnormal caliber. Enlarged low-density liver concerning for fatty liver. Right pleural effusion, ascites, body wall edema suggests fluid overload. Heavily calcified atherosclerosis. Subacute to chronic appearing pubic rami fractures. · Abdominal complete (02/21/2021): Impression: Liver does not appear fatty by ultrasound. Low density appearance on prior CT may have been related to phase of contrast. Cholecystectomy. Common bile duct measures up to 1 cm, similar compared to 2/19/2021. This can be seen as reservoir effect after cholecystectomy. Bilateral renal atrophy. Right pleural effusion. Small volume ascites  · GI following recommendations much appreciated      Acute metabolic/toxic encephalopathy  Intermittent speech difficulties  · Follow-up MRI brain without contrast (02/26/2021) rule out any underlying CVA  · SLP Cognitive eval  · Consider Neurology consult    Addendum  Currently medically necessary, for patient to receive a recommended MRI brain without contrast, to rule out any possible acute/subacute CVA.     Subacute to chronic pubic rami fractures. · Continue supportive symptomatic management including pain management as needed  · Seen by Orthopedic surgeryappreciate recommendations  · Continue PT OT      History of ESRD, on scheduled HD Tues_Thur_Sat  · Hyperkalemia, resolved  · Initial BUN/creatinine of 77/7.2  · Reportedly missed 1 session of HD (02/18/2021), prior to presentation  · Nephrology followingappreciate recommendations  · Continue scheduled HD  · Continue management as per nephrology rec        History of insulin-dependent Diabetes Mellitus II  · Hypoglycemic on presentation  · Hemoglobin A1C: 6.7% (02/19/2021)  · Inpatient Regimens to include;  ? -Hold basal Insulin Glargine (Lantus), for now, given concern for hypoglycemia  ? - Insulin Lispro (Humalog) on a Low dose sliding scale  · Monitor POC glucose, and adjust insulin regimen accordingly based on daily insulin requirement.         Continue management of other chronic medical conditions - see above and orders. Advance Directive: Full Code    DIET CARB CONTROL;  Renal         Consults Made:   IP CONSULT TO NEPHROLOGY  IP CONSULT TO NEPHROLOGY IP CONSULT TO SOCIAL WORK  IP CONSULT TO GI  PHARMACY TO DOSE VANCOMYCIN  IP CONSULT TO ORTHOPEDIC SURGERY  IP CONSULT TO REHAB/TCU ADMISSION COORDINATOR  IP CONSULT TO NEUROLOGY    DVT prophylaxis: Heparin      Discharge planning:   Mental status improving  Continue current management and work-up as noted above. Patient appears to be having visible intermittent speech difficulty. Acuity unclear given patient has been extremely lethargic and confused throughout hospital course. Follow-up MRI brain without contrast rule out any underlying CVA  Cognitive eval  Consider Neurology consult      Time Spent is 25 mins in the examination, evaluation, counseling and review of medications, assessment and plan.      Electronically signed by   Vicente Pope MD, MPH,   Internal Medicine Hospitalist   2/26/2021 3:34 PM

## 2021-02-26 NOTE — PROGRESS NOTES
Physical Therapy  Name: Ishmael Celis  MRN:  [id number]  Date of service:  2/26/2021 02/26/21 1133   Subjective   Subjective Pt agrees to work with therapy this morning   General Comment   Comments pt found in bed with gown mostly off , pt tends to pick at skin, nails are bloody   Pain Screening   Patient Currently in Pain Denies   Bed Mobility   Supine to Sit Contact guard assistance   Transfers   Sit to Stand Minimal Assistance   Stand to sit Minimal Assistance   Ambulation   Ambulation? Yes   Ambulation 1   Surface level tile   Device Rolling Walker   Assistance Contact guard assistance  (vc's to take any steps forward)   Gait Deviations Slow Robyn;Decreased step length;Decreased step height   Distance 5'   Comments up to recliner   Short term goals   Time Frame for Short term goals 2 wks   Short term goal 1 SUP<>SIT, SBA   Short term goal 2 SIT<>STAND, CGA   Short term goal 3 STAND STEP TF'S WITH RW AND CGA   Short term goal 4 AMB 50 FT WITH RW, CGA   Conditions Requiring Skilled Therapeutic Intervention   Body structures, Functions, Activity limitations Decreased functional mobility ; Decreased endurance;Decreased strength;Decreased safe awareness;Decreased cognition   Assessment Pt was able to get up to recliner and take a few steps. Pt very slow to answer questions or follow any commands. Activity Tolerance   Activity Tolerance Patient limited by endurance; Patient limited by cognitive status   Safety Devices   Type of devices Left in chair;Call light within reach; Chair alarm in place         Electronically signed by Silke Stewart PTA on 2/26/2021 at 11:38 AM

## 2021-02-27 ENCOUNTER — APPOINTMENT (OUTPATIENT)
Dept: CT IMAGING | Age: 61
DRG: 291 | End: 2021-02-27
Payer: MEDICARE

## 2021-02-27 VITALS
TEMPERATURE: 97.3 F | OXYGEN SATURATION: 100 % | HEIGHT: 59 IN | HEART RATE: 85 BPM | SYSTOLIC BLOOD PRESSURE: 159 MMHG | RESPIRATION RATE: 20 BRPM | WEIGHT: 136.7 LBS | BODY MASS INDEX: 27.56 KG/M2 | DIASTOLIC BLOOD PRESSURE: 69 MMHG

## 2021-02-27 PROBLEM — R41.82 ALTERED MENTAL STATE: Status: ACTIVE | Noted: 2021-02-27

## 2021-02-27 PROBLEM — R53.1 WEAKNESS: Status: ACTIVE | Noted: 2021-02-27

## 2021-02-27 PROBLEM — R19.7 ACUTE DIARRHEA: Status: RESOLVED | Noted: 2021-02-19 | Resolved: 2021-02-27

## 2021-02-27 PROBLEM — R74.8 ELEVATED LIPASE: Status: RESOLVED | Noted: 2021-02-19 | Resolved: 2021-02-27

## 2021-02-27 LAB
ANION GAP SERPL CALCULATED.3IONS-SCNC: 12 MMOL/L (ref 7–19)
BASOPHILS ABSOLUTE: 0 K/UL (ref 0–0.2)
BASOPHILS RELATIVE PERCENT: 0.5 % (ref 0–1)
BUN BLDV-MCNC: 31 MG/DL (ref 8–23)
CALCIUM SERPL-MCNC: 9.6 MG/DL (ref 8.8–10.2)
CHLORIDE BLD-SCNC: 96 MMOL/L (ref 98–111)
CO2: 33 MMOL/L (ref 22–29)
CREAT SERPL-MCNC: 4.7 MG/DL (ref 0.5–0.9)
EOSINOPHILS ABSOLUTE: 0.2 K/UL (ref 0–0.6)
EOSINOPHILS RELATIVE PERCENT: 5.4 % (ref 0–5)
GFR AFRICAN AMERICAN: 11
GFR NON-AFRICAN AMERICAN: 9
GLUCOSE BLD-MCNC: 113 MG/DL (ref 74–109)
GLUCOSE BLD-MCNC: 136 MG/DL (ref 70–99)
GLUCOSE BLD-MCNC: 152 MG/DL (ref 70–99)
HCT VFR BLD CALC: 31.8 % (ref 37–47)
HEMOGLOBIN: 9.9 G/DL (ref 12–16)
IMMATURE GRANULOCYTES #: 0 K/UL
LYMPHOCYTES ABSOLUTE: 0.6 K/UL (ref 1.1–4.5)
LYMPHOCYTES RELATIVE PERCENT: 16.5 % (ref 20–40)
MCH RBC QN AUTO: 30.8 PG (ref 27–31)
MCHC RBC AUTO-ENTMCNC: 31.1 G/DL (ref 33–37)
MCV RBC AUTO: 99.1 FL (ref 81–99)
MONOCYTES ABSOLUTE: 0.6 K/UL (ref 0–0.9)
MONOCYTES RELATIVE PERCENT: 14.2 % (ref 0–10)
NEUTROPHILS ABSOLUTE: 2.4 K/UL (ref 1.5–7.5)
NEUTROPHILS RELATIVE PERCENT: 63.1 % (ref 50–65)
PDW BLD-RTO: 17.2 % (ref 11.5–14.5)
PERFORMED ON: ABNORMAL
PERFORMED ON: ABNORMAL
PLATELET # BLD: 109 K/UL (ref 130–400)
PMV BLD AUTO: 9.8 FL (ref 9.4–12.3)
POTASSIUM REFLEX MAGNESIUM: 4.2 MMOL/L (ref 3.5–5)
RBC # BLD: 3.21 M/UL (ref 4.2–5.4)
SODIUM BLD-SCNC: 141 MMOL/L (ref 136–145)
VANCOMYCIN RANDOM: 14.5 UG/ML
WBC # BLD: 3.9 K/UL (ref 4.8–10.8)

## 2021-02-27 PROCEDURE — 99222 1ST HOSP IP/OBS MODERATE 55: CPT | Performed by: PSYCHIATRY & NEUROLOGY

## 2021-02-27 PROCEDURE — 6370000000 HC RX 637 (ALT 250 FOR IP): Performed by: INTERNAL MEDICINE

## 2021-02-27 PROCEDURE — 96116 NUBHVL XM PHYS/QHP 1ST HR: CPT

## 2021-02-27 PROCEDURE — 80202 ASSAY OF VANCOMYCIN: CPT

## 2021-02-27 PROCEDURE — 36415 COLL VENOUS BLD VENIPUNCTURE: CPT

## 2021-02-27 PROCEDURE — 85025 COMPLETE CBC W/AUTO DIFF WBC: CPT

## 2021-02-27 PROCEDURE — 82947 ASSAY GLUCOSE BLOOD QUANT: CPT

## 2021-02-27 PROCEDURE — 2580000003 HC RX 258: Performed by: INTERNAL MEDICINE

## 2021-02-27 PROCEDURE — 8010000000 HC HEMODIALYSIS ACUTE INPT

## 2021-02-27 PROCEDURE — 94640 AIRWAY INHALATION TREATMENT: CPT

## 2021-02-27 PROCEDURE — 80048 BASIC METABOLIC PNL TOTAL CA: CPT

## 2021-02-27 PROCEDURE — 6360000002 HC RX W HCPCS: Performed by: INTERNAL MEDICINE

## 2021-02-27 PROCEDURE — 72125 CT NECK SPINE W/O DYE: CPT

## 2021-02-27 RX ADMIN — IPRATROPIUM BROMIDE AND ALBUTEROL SULFATE 1 AMPULE: .5; 3 SOLUTION RESPIRATORY (INHALATION) at 15:16

## 2021-02-27 RX ADMIN — SEVELAMER CARBONATE 800 MG: 800 TABLET, FILM COATED ORAL at 14:52

## 2021-02-27 RX ADMIN — MEROPENEM 500 MG: 500 INJECTION, POWDER, FOR SOLUTION INTRAVENOUS at 14:53

## 2021-02-27 RX ADMIN — FAMOTIDINE 10 MG: 20 TABLET, FILM COATED ORAL at 14:52

## 2021-02-27 RX ADMIN — HEPARIN SODIUM 5000 UNITS: 5000 INJECTION INTRAVENOUS; SUBCUTANEOUS at 14:55

## 2021-02-27 RX ADMIN — DARBEPOETIN ALFA 60 MCG: 60 SOLUTION INTRAVENOUS; SUBCUTANEOUS at 14:52

## 2021-02-27 RX ADMIN — IPRATROPIUM BROMIDE AND ALBUTEROL SULFATE 1 AMPULE: .5; 3 SOLUTION RESPIRATORY (INHALATION) at 10:48

## 2021-02-27 RX ADMIN — Medication 81 MG: at 14:52

## 2021-02-27 RX ADMIN — IPRATROPIUM BROMIDE AND ALBUTEROL SULFATE 1 AMPULE: .5; 3 SOLUTION RESPIRATORY (INHALATION) at 07:08

## 2021-02-27 RX ADMIN — Medication 10 ML: at 08:00

## 2021-02-27 RX ADMIN — HEPARIN SODIUM 5000 UNITS: 5000 INJECTION INTRAVENOUS; SUBCUTANEOUS at 04:46

## 2021-02-27 NOTE — PROGRESS NOTES
Nephrology ([facility]) Progress Note    Patient:  Jonathan Braden  YOB: 1960  Date of Service: 2/27/2021  MRN: [id number]   Acct: [de-identified]   Primary Care Physician: RAMONA Garcia  Advance Directive: Full Code  Admit Date: 2/19/2021       Hospital Day: 8  Referring Provider: Temo Grady MD    Patient Seen, Chart, Consults, Notes, Labs, Radiology studies reviewed. Subjective:  Jonathan Braden is a 56 y.o. female  whom we were consulted for end-stage renal disease. Patient goes to [facility] dialysis clinic on Tuesday Thursday Saturday however she has skipped treatment on Thursday afternoon. She presented to emergency room on Friday morning with increasing weakness, shortness of breath. She also reports some diarrhea. Incidental finding of the labs shows potassium was 6.5 mmol. She was also clinically volume overload. She has received emergent dialysis treatment on Friday. She has received routine hemodialysis treatment on 2/20.     Seen and examined on dialysis. Patient is awaiting final clearance to go home.      Dialysis   Pt was seen on RRT  Modality: Hemodialysis  Access: Arterial Venous Fistula  Location: left upper  QB: 400  QD: 600  UF: 2.5 Liters    Allergies:  Bupropion, Gabapentin, Sulfa antibiotics, Varenicline, Wellbutrin [bupropion hcl], Azithromycin, Levaquin [levofloxacin], and Penicillins    Medicines:  Current Facility-Administered Medications   Medication Dose Route Frequency Provider Last Rate Last Admin    carvedilol (COREG) tablet 12.5 mg  12.5 mg Oral BID  Temo Grady MD   12.5 mg at 02/26/21 1703    losartan (COZAAR) tablet 50 mg  50 mg Oral BID Temo Grady MD   50 mg at 02/26/21 2100    famotidine (PEPCID) tablet 10 mg  10 mg Oral Daily Temo Grady MD   10 mg at 02/26/21 9786  darbepoetin angie-polysorbate (ARANESP) injection 60 mcg  60 mcg Subcutaneous Weekly Demetrio Francis MD   60 mcg at 02/20/21 1247    meropenem (MERREM) 500 mg in sterile water 10 mL IV syringe  500 mg Intravenous Q24H Tucker Montelongo MD   500 mg at 02/26/21 1816    vancomycin (VANCOCIN) intermittent dosing (placeholder)   Other RX Placeholder Tucker Montelongo MD        oxyCODONE-acetaminophen (PERCOCET) 5-325 MG per tablet 1 tablet  1 tablet Oral Q6H PRN Tucker Montelongo MD   1 tablet at 02/23/21 1230    ipratropium-albuterol (DUONEB) nebulizer solution 1 ampule  1 ampule Inhalation Q4H WA Tucker Montelongo MD   1 ampule at 02/27/21 0708    aspirin EC tablet 81 mg  81 mg Oral Daily Tucker Montelongo MD   81 mg at 02/26/21 0832    albuterol (PROVENTIL) nebulizer solution 2.5 mg  2.5 mg Nebulization Q6H PRN Tucker Montelongo MD        sevelamer (RENVELA) tablet 800 mg  800 mg Oral Daily Tucker Montelongo MD   800 mg at 02/26/21 0832    glucose (GLUTOSE) 40 % oral gel 15 g  15 g Oral PRN Tucker Montelongo MD        dextrose 50 % IV solution  12.5 g Intravenous PRN Tucker Montelongo MD   12.5 g at 02/19/21 2110    glucagon (rDNA) injection 1 mg  1 mg Intramuscular PRN Tucker Montelongo MD        dextrose 5 % solution  100 mL/hr Intravenous PRN Tucker Montelongo MD 50 mL/hr at 02/20/21 0054 50 mL/hr at 02/20/21 0054    potassium chloride (KLOR-CON M) extended release tablet 40 mEq  40 mEq Oral PRN Tucker Montelongo MD        Or    potassium bicarb-citric acid (EFFER-K) effervescent tablet 40 mEq  40 mEq Oral PRN Tucker Montelongo MD        Or    potassium chloride 10 mEq/100 mL IVPB (Peripheral Line)  10 mEq Intravenous PRN Tucker Montelongo MD        promethazine (PHENERGAN) tablet 12.5 mg  12.5 mg Oral Q6H PRN Tucker Montelongo MD        Or    ondansetron [facility]) injection 4 mg  4 mg Intravenous Q6H PRN Tucker Montelongo MD   4 mg at 02/21/21 0579  magnesium hydroxide (MILK OF MAGNESIA) 400 MG/5ML suspension 30 mL  30 mL Oral Daily PRN Tucker Montelongo MD        insulin lispro (HUMALOG) injection vial 0-6 Units  0-6 Units Subcutaneous TID WC Tucker Montelongo MD   1 Units at 02/26/21 0835    insulin lispro (HUMALOG) injection vial 0-3 Units  0-3 Units Subcutaneous Nightly Tucker Montelongo MD   1 Units at 02/26/21 2100    heparin (porcine) injection 5,000 Units  5,000 Units Subcutaneous 3 times per day Tucker Montelongo MD   5,000 Units at 02/27/21 0446    sodium chloride flush 0.9 % injection 10 mL  10 mL Intravenous 2 times per day Tucker Montelongo MD   10 mL at 02/27/21 0800    sodium chloride flush 0.9 % injection 10 mL  10 mL Intravenous PRN Tucker Montelongo MD           Past Medical History:  Past Medical History:   Diagnosis Date    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 [facility]    Hypertension     2000, dx'd at same time as the DM    Obesity     Renal osteodystrophy     Smoker     Type II or unspecified type diabetes mellitus with renal manifestations, uncontrolled(250.42)        Past Surgical History:  Past Surgical History:   Procedure Laterality Date    CARPAL TUNNEL RELEASE      CATARACT REMOVAL     [address], and 1979    CHOLECYSTECTOMY      COLONOSCOPY N/A 3/9/2018    Dr ReevesFoemwu-Gqoeyztndnzkab-Gobpraldrppbf AP (-) dysplasia x 1, tubular AP x  (-) dysplasia x 1, HP x 4--1 yr recall    DIALYSIS FISTULA CREATION Left 4/23/15 [facility] Left proximal ulnar artery to cephalic vein AVF creation    EYE SURGERY      laser, several times     TYMPANOSTOMY TUBE PLACEMENT Bilateral     VASCULAR SURGERY Left 5/11/15 [facility]    US-guided cannulation left distal upper arm cephalic vein with 4-[place] glide sheath; LUE AV f'grams with venography SVC; left cephalic vein BAM with [id number] julieth balloon; completion venogram [facility]    VASCULAR SURGERY Left 5/28/15 [facility]    Percutaneous cannulation left distal radial artery with 4-[place] glide sheath; LUE AV f'grams with venography SVC; left cephalic vein balloon a'plasty with [id number] julieth balloon and [id number] julieth balloon; proximal and mid upper arm cephalic vein BAM with [id number] julieth balloon; completion venogram [facility]    VASCULAR SURGERY Left 6/15/15 [facility]    US-guided cannulation left cephalic vein with 4-[place]ayan and later 7-[place]ayan sheath; LUE AV f'grams including venography SVC; left cephalic vein stenosis balloon a'plasty with [id number] cutting balloon; completion f'gram and venogram [facility]    VASCULAR SURGERY  05/15/2017    [facility]. Left upper fistulograms/venograms.  VASCULAR SURGERY  02/14/2018    [facility]. Left upper fistulograms, left subclavian BA 12x40 atlas, left cephalic arch BA [id number] conquest.    VASCULAR SURGERY  02/13/2019    [facility]. Left upper fistulograms,BA left SCV 8x40 conquest,stent left SCV, viabahn 13x50,left SCV stent BA 12x40 conquest.       Family History  Family History   Problem Relation Age of Onset    Heart Disease Mother     Heart Attack Mother     Glaucoma Sister     Diabetes Sister     Diabetes Brother     Kidney Disease Brother     Blindness Brother     Stroke Maternal Grandmother     Stroke Maternal Grandfather     Colon Cancer Neg Hx     Colon Polyps Neg Hx     Liver Cancer Neg Hx     Liver Disease Neg Hx     Esophageal Cancer Neg Hx     Rectal Cancer Neg Hx     Stomach Cancer Neg Hx        Social History  Social History     Socioeconomic History  Marital status:      Spouse name: Not on file    Number of children: Not on file    Years of education: Not on file    Highest education level: Not on file   Occupational History    Not on file   Social Needs    Financial resource strain: Not on file    Food insecurity     Worry: Not on file     Inability: Not on file    Transportation needs     Medical: Not on file     Non-medical: Not on file   Tobacco Use    Smoking status: Current Every Day Smoker     Packs/day: 1.00     Years: 40.00     Pack years: 40.00    Smokeless tobacco: Never Used   Substance and Sexual Activity    Alcohol use: No     Alcohol/week: 0.0 standard drinks    Drug use: Yes     Types: Marijuana    Sexual activity: Not Currently     Partners: Male   Lifestyle    Physical activity     Days per week: Not on file     Minutes per session: Not on file    Stress: Not on file   Relationships    Social connections     Talks on phone: Not on file     Gets together: Not on file     Attends [employer] service: Not on file     Active member of club or organization: Not on file     Attends meetings of clubs or organizations: Not on file     Relationship status: Not on file    Intimate partner violence     Fear of current or ex partner: Not on file     Emotionally abused: Not on file     Physically abused: Not on file     Forced sexual activity: Not on file   Other Topics Concern    Not on file   Social History Narrative    Not on file         Review of Systems:  Reviewed with the patient at the bedside, 6 points reviewed and negative except as noted above.       Objective:  BP:164/80  Hr: 81    General: awake/alert   Chest:  clear to auscultation bilaterally without respiratory distress  CVS: regular rate and rhythm  Abdominal: soft, nontender, normal bowel sounds  Extremities: no cyanosis or edema  Skin: warm and dry without rash    Labs:  BMP:   Recent Labs     02/25/21  0247 02/26/21  0151 02/27/21  0133    142 141 K 4.1 3.8 4.2   CL 93* 96* 96*   CO2 27 30* 33*   BUN 34* 19 31*   CREATININE 5.0* 3.5* 4.7*   CALCIUM 10.0 9.5 9.6     CBC:   Recent Labs     02/25/21  0247 02/26/21  0151 02/27/21  0133   WBC 4.2* 4.3* 3.9*   HGB 10.3* 9.7* 9.9*   HCT 33.5* 30.2* 31.8*   .0* 97.1 99.1*   * 111* 109*     LIVER PROFILE:   No results for input(s): AST, ALT, LIPASE, BILIDIR, BILITOT, ALKPHOS in the last 72 hours. Invalid input(s): AMYLASE,  ALB  PT/INR: No results for input(s): PROTIME, INR in the last 72 hours. APTT: No results for input(s): APTT in the last 72 hours. BNP:  No results for input(s): BNP in the last 72 hours. Ionized Calcium:No results for input(s): IONCA in the last 72 hours. Magnesium:  No results for input(s): MG in the last 72 hours. Phosphorus:  No results for input(s): PHOS in the last 72 hours. HgbA1C: No results for input(s): LABA1C in the last 72 hours. Hepatic:   No results for input(s): ALKPHOS, ALT, AST, PROT, BILITOT, BILIDIR, LABALBU in the last 72 hours. Lactic Acid: No results for input(s): LACTA in the last 72 hours. Troponin: No results for input(s): CKTOTAL, CKMB, TROPONINT in the last 72 hours. ABGs: No results for input(s): PH, PCO2, PO2, HCO3, O2SAT in the last 72 hours. CRP:  No results for input(s): CRP in the last 72 hours. Sed Rate:  No results for input(s): SEDRATE in the last 72 hours. Cultures:   No results for input(s): CULTURE in the last 72 hours. No results for input(s): BC, [place] Haste in the last 72 hours. No results for input(s): CXSURG in the last 72 hours.     Radiology reports as per the Radiologist  Radiology: Ct Head Wo Contrast    Result Date: 2/19/2021 Examination. CT HEAD WO CONTRAST 2/19/2021 12:55 PM History: Altered mental status. DLP: 1305 mGycm. The CT scan of the head is performed without intravenous contrast enhancement. The images are acquired in axial plane and subsequent reconstruction in coronal and sagittal planes. The comparison is made with the previous study dated 9/1/2020. There is no evidence of mass. No midline shift. There is no evidence of an intracranial hemorrhage or hematoma. Moderately dilated ventricles, basal cistern and the cortical sulci are similar to the previous study suggesting a chronic volume loss. A few scattered areas of chronic white matter ischemia in the centrum semiovale bilaterally are stable. The gray-white matter differentiation is maintained. A partially empty sella turcica is seen. The images reviewed in bone window show no acute bony abnormality. The visualized paranasal sinuses and mastoid air cells are clear. A curvilinear metallic density in the lateral margin of the left globe/orbit is similar to the previous study. No acute intracranial abnormality.  Signed by Dr Carolyn Jaramillo on 2/19/2021 2:38 PM    Ct Chest W Contrast    Result Date: 2/19/2021 EXAM: CT CHEST W CONTRAST -- 2/19/2021 12:55 PM HISTORY: 56 years, Female, abnormal chest x-ray COMPARISON: 2/19/2021 DLP: 769.5 mGy cm. Automated exposure control was utilized to minimize patient radiation dose. TECHNIQUE: Enhanced  CT images of the chest obtained with multiplanar reformats. FINDINGS: AIRWAYS/PULMONARY PARENCHYMA: The central airways are midline and patent. Expiratory phase of imaging. Small right pleural effusion with compressive enhancing breast atelectasis involving the right middle and lower lobes. Right upper lobe with a pleural-based 1.7 x 2.0 cm opacity, which could represent round atelectasis, scarring or nodule. VASCULATURE: Thoracic aorta is normal in course and caliber. Mild calcified aortic atherosclerosis. Normal pulmonary artery caliber. No large central pulmonary artery filling defect on this non-CTA exam. Left subclavian vein with short segment severe stenosis on axial image 29. This is just lateral to the stent. There are left chest wall collaterals suggesting that this is a chronic finding. CARDIAC:  Borderline heart size. No pericardial effusion. Scattered coronary artery calcifications. MEDIASTINUM: There is no mediastinal or hilar lymphadenopathy by CT size criteria. Esophagus has normal coarse, caliber and wall thickness. EXTRATHORACIC: The visualized portions of the thyroid gland are unremarkable. No thoracic inlet or axillary adenopathy. Body wall edema. Left chest wall collaterals. INCLUDED UPPER ABDOMEN: The liver appears prominent low density, which could be due to fatty infiltration or phase of contrast. Cholecystectomy clips. No bile duct dilation. Small ascites. There is wedge-shaped low-density in the spleen, concerning for age-indeterminate splenic infarct. Visualized portion of the pancreas and adrenal glands appear within normal limits. Atrophic kidneys partially visualized. OSSEOUS: Mild degenerative changes in the spine. No acute or aggressive bony finding. EXAM: CT ABDOMEN PELVIS W IV CONTRAST -- 2/19/2021 12:55 PM HISTORY: 56 years, Female, markedly elevated lipase, abdominal pain, diarrhea, ESRD on hemodialysis COMPARISON: No existing relevant imaging studies available DLP: 769.5 mGy cm. Automated exposure control was utilized to minimize patient radiation dose. TECHNIQUE: Enhanced CT images obtained of the abdomen and pelvis with multiplanar reformats. FINDINGS: LUNG BASES: Right pleural effusion with compressive atelectasis. Underlying inferior heart size with scattered coronary artery calcifications. LIVER: Diffuse low-density of the liver concerning for fatty liver. Liver appears mildly enlarged. Patent portal and hepatic veins. GALLBLADDER and BILARY: Cholecystectomy clips. No bile duct dilation. PANCREAS: Normal enhancement of the pancreas. There is ascites and mesenteric edema throughout the abdomen, which limited evaluation of the peripancreatic fat. SPLEEN: There is a wedge-shaped hypodensity in the spleen concerning for infarct. Splenic vein appears grossly patent. Normal caliber splenic artery. ADRENALS: No adrenal mass. URINARY: Atrophic kidneys. No hydronephrosis. Renal vascular calcifications. No convincing nephrolithiasis. Urinary bladder is underdistended, which limits evaluation. Grossly normal CT appearance of the uterus. Adnexa are not discretely identified. PERITONEUM: No ascites/free fluid. No pneumoperitoneum. BOWEL: Stomach and duodenum have normal course and caliber. No abnormally dilated loops of bowel or bowel wall thickening. Normal short appendix or appendiceal stump on axial images 61-57. RETROPERITONEUM:  Aorta normal in course and caliber with calcified atherosclerosis. Celiac and superior mesenteric arteries patent. Bilateral renal arteries diminutive. Inferior mesenteric artery opacified.  Heavily calcified iliac arteries, limited in evaluation on this non-CTA exam. Scattered retroperitoneal lymph nodes, not enlarged by CT size criteria, which may be reactive. ABDOMINAL WALL: Significant body wall edema. Prominent vessel in the partially visualized left distal upper arm, possibly fistula. OSSEOUS: Subacute to chronic right superior pubic ramus, right inferior pubic ramus and left inferior pubic rami fractures. 1. Ascites and mesenteric edema Limited evaluation of the peripancreatic fat. Normal enhancement of the pancreas. 2. Wedge-shaped hypodensity in the spleen concerning for age-indeterminate infarct. Splenic vein and artery appear patent and normal caliber. 3. Enlarged low-density liver concerning for fatty liver. 4. Right pleural effusion, ascites, body wall edema suggests fluid overload. 5. Heavily calcified atherosclerosis. 6. Subacute to chronic appearing pubic rami fractures. 7. See separately dictated CT chest of the same day. Signed by Dr Maxwell Garcia on 2/19/2021 2:54 PM    Xr Chest Portable    Result Date: 2/19/2021  XR CHEST PORTABLE 2/19/2021 10:18 AM HISTORY: Shortness of breath COMPARISON: Chest x-ray dated 10/10/2020. FINDINGS: Mild cardiomegaly which is stable. Bilateral coarsened interstitial markings with large layering right pleural effusion. No pneumothorax. Vascular stent along the left brachiocephalic vein. No acute bony abnormality. 1. Interstitial edema with large layering right pleural effusion. Signed by Dr Samuel Hathaway on 2/19/2021 11:44 AM       Assessment   1. End-stage renal disease/maintenance dialysis. 2.  Type II diabetic nephropathy. 3.  Noncompliant dialysis patient. 4.  Anemia of chronic kidney disease. 5.  Hyperkalemia now resolved. 6.  Clinically volume overloaded  7. Right-sided pleural effusion/large. 8.  Metabolic encephalopathy  9. Pubic rami fractures/recent fall    Plan:  Dialysis as above, follow-up labs.     Hugo Madrigal MD  02/27/21  10:23 AM

## 2021-02-27 NOTE — CONSULTS
[facility] Neurology Consult        Patient:   Tanvir Frederick  MR#:    [id number]  Account Number:                   [id number]      Room:    [address] [place] [address]-   YOB: 1960  Date of Progress Note: 2/27/2021  Time of Note                           1:53 PM  Attending Physician:  Tirso Jones MD  Consulting Physician:   Jonas Bills M.D.        [address]:  [facility]       HISTORY OF PRESENT ILLNESS:   This 56 y.o. 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 The patient has had a prolonged hospitalization after being admitted on 2/19/21 for diarrhea and lethargy. The patient had missed dialysis prior to admission due to having diarrhea. She is on chronic oxygen at home and was slightly confused on admission. She was at baseline able to do her activities of daily living but on the day of admission she had significant generalized weakness and lethargy. She was reported to have some jerking movements of the upper extremities. She had markedly elevated lipase and mildly elevated liver function tests with a normal ammonia level of 13. She was placed on broad spectrum antibiotics for her diarrhea. She began to have increased confusion and agitation. It appears she has slowly improved in mentation but is still confused. CT head had no acute changes. REVIEW OF SYSTEMS:  Constitutional  No fever or chills. No diaphoresis or significant fatigue. HENT   No tinnitus or significant hearing loss. Eyes  no sudden vision change or eye pain  Respiratory  some shortness of breath or cough  Cardiovascular  no chest pain No palpitations or significant leg swelling  Gastrointestinal  no abdominal swelling or pain. Genitourinary  No difficulty urinating, dysuria  Musculoskeletal  no back pain or myalgia.   Skin  no color change or rash Neurologic  No seizures. No lateralizing weakness. Hematologic  no easy bruising or excessive bleeding. Psychiatric  no severe anxiety or nervousness. All other review of systems are negative.       Past Medical History:      Diagnosis Date    Blind right eye     partial vision loss in the L eye too, due to DM    Blindness of one eye     Right     CHF (congestive heart failure) (HCC)     CKD (chronic kidney disease), stage IV (HCC)     secondary to diabetic nephropathy    [facility]     Diabetes mellitus with ophthalmic manifestations     dx'd in 2000 but likely ongoing before that, was dx'd when she went blind in the R eye    Glaucoma     [facility]     dialysis tues, thurs, sat. [address], [facility]    Hypertension     2000, dx'd at same time as the DM    Obesity     Renal osteodystrophy     Smoker     Type II or unspecified type diabetes mellitus with renal manifestations, uncontrolled(250.42)        Past Surgical History:      Procedure Laterality Date    CARPAL TUNNEL RELEASE      CATARACT REMOVAL     [address] [place], and 1979    CHOLECYSTECTOMY      COLONOSCOPY N/A 3/9/2018    Dr ReevesKgdqmk-Fcealoqqcahlfp-Psrolndeylqep AP (-) dysplasia x 1, tubular AP x  (-) dysplasia x 1, HP x 4--1 yr recall    DIALYSIS FISTULA CREATION Left 4/23/15 [facility]    Left proximal ulnar artery to cephalic vein AVF creation    EYE SURGERY      laser, several times     TYMPANOSTOMY TUBE PLACEMENT Bilateral     VASCULAR SURGERY Left 5/11/15 [facility]    US-guided cannulation left distal upper arm cephalic vein with 4-Dominican glide sheath; LUE AV f'grams with venography SVC; left cephalic vein BAM with [id number] julieth balloon; completion venogram LUE    VASCULAR SURGERY Left 5/28/15 [facility] Percutaneous cannulation left distal radial artery with 4-Polish glide sheath; LUE AV f'grams with venography SVC; left cephalic vein balloon a'plasty with [id number] julieth balloon and [id number] julieth balloon; proximal and mid upper arm cephalic vein BAM with [id number] julieth balloon; [place] [facility]    VASCULAR SURGERY Left 6/15/15 S    US-guided cannulation left cephalic vein with 4-Polish and later 7-Polish sheath; LUE AV f'grams including venography SVC; left cephalic vein stenosis balloon a'plasty with [id number] cutting balloon; completion f'gram and venogram [facility]    VASCULAR SURGERY  05/15/2017    S. Left upper fistulograms/venograms.  VASCULAR SURGERY  02/14/2018    S. Left upper fistulograms, left subclavian BA 12x40 atlas, left cephalic arch BA 25P84 conquest.    VASCULAR SURGERY  02/13/2019    S. Left upper fistulograms,BA left SCV 8x40 conquest,stent left SCV, viabahn 13x50,left SCV stent BA 12x40 conquest.       Medications in Hospital:      Current Facility-Administered Medications:     carvedilol (COREG) tablet 12.5 mg, 12.5 mg, Oral, BID WC, Nadeem Yoo MD, 12.5 mg at 02/26/21 1703    losartan (COZAAR) tablet 50 mg, 50 mg, Oral, BID, Nadeem Yoo MD, 50 mg at 02/26/21 2100    famotidine (PEPCID) tablet 10 mg, 10 mg, Oral, Daily, Nadeem Yoo MD, 10 mg at 02/26/21 1081    darbepoetin angie-polysorbate (ARANESP) injection 60 mcg, 60 mcg, Subcutaneous, Weekly, Cesar Peterson MD, 60 mcg at 02/20/21 1247    meropenem (MERREM) 500 mg in sterile water 10 mL IV syringe, 500 mg, Intravenous, Q24H, Nadeem Yoo MD, 500 mg at 02/26/21 1816    vancomycin (VANCOCIN) intermittent dosing (placeholder), , Other, RX Placeholder, Nadeem Yoo MD    oxyCODONE-acetaminophen (PERCOCET) 5-325 MG per tablet 1 tablet, 1 tablet, Oral, Q6H PRN, Nadeem Yoo MD, 1 tablet at 02/23/21 123   ipratropium-albuterol (DUONEB) nebulizer solution 1 ampule, 1 ampule, Inhalation, Q4H WA, Richard Manriquez MD, 1 ampule at 02/27/21 1048    aspirin EC tablet 81 mg, 81 mg, Oral, Daily, Richard Manriquez MD, 81 mg at 02/26/21 0832    albuterol (PROVENTIL) nebulizer solution 2.5 mg, 2.5 mg, Nebulization, Q6H PRN, Richard Manriquez MD    sevelamer (RENVELA) tablet 800 mg, 800 mg, Oral, Daily, Richard Manriquez MD, 800 mg at 02/26/21 0832    glucose (GLUTOSE) 40 % oral gel 15 g, 15 g, Oral, PRN, Richard Manriquez MD    dextrose 50 % IV solution, 12.5 g, Intravenous, PRN, Richard Manriquez MD, 12.5 g at 02/19/21 2110    glucagon (rDNA) injection 1 mg, 1 mg, Intramuscular, PRN, Richard Manriquez MD    dextrose 5 % solution, 100 mL/hr, Intravenous, PRN, Richard Manriquez MD, Last Rate: 50 mL/hr at 02/20/21 0054, 50 mL/hr at 02/20/21 0054    potassium chloride (KLOR-CON M) extended release tablet 40 mEq, 40 mEq, Oral, PRN **OR** potassium bicarb-citric acid (EFFER-K) effervescent tablet 40 mEq, 40 mEq, Oral, PRN **OR** potassium chloride 10 mEq/100 mL IVPB (Peripheral Line), 10 mEq, Intravenous, PRN, Richard Manriquez MD    promethazine (PHENERGAN) tablet 12.5 mg, 12.5 mg, Oral, Q6H PRN **OR** ondansetron (ZOFRAN) injection 4 mg, 4 mg, Intravenous, Q6H PRN, Richard Manriquez MD, 4 mg at 02/21/21 0224    magnesium hydroxide (MILK OF MAGNESIA) 400 MG/5ML suspension 30 mL, 30 mL, Oral, Daily PRN, Richard Manriquez MD    insulin lispro (HUMALOG) injection vial 0-6 Units, 0-6 Units, Subcutaneous, TID WC, Richard Manriquez MD, 1 Units at 02/26/21 0835    insulin lispro (HUMALOG) injection vial 0-3 Units, 0-3 Units, Subcutaneous, Nightly, Richard Manriquez MD, 1 Units at 02/26/21 2100    heparin (porcine) injection 5,000 Units, 5,000 Units, Subcutaneous, 3 times per day, Richard Manriquez MD, 5,000 Units at 02/27/21 8716   sodium chloride flush 0.9 % injection 10 mL, 10 mL, Intravenous, 2 times per day, Yared Brown MD, 10 mL at 02/27/21 0800    sodium chloride flush 0.9 % injection 10 mL, 10 mL, Intravenous, PRN, Yared Brown MD    Allergies:  Bupropion, Gabapentin, Sulfa antibiotics, Varenicline, Wellbutrin [bupropion hcl], Azithromycin, Levaquin [levofloxacin], and Penicillins    Social History:   TOBACCO:   reports that she has been smoking. She has a 40.00 pack-year smoking history. She has never used smokeless tobacco.  ETOH:   reports no history of alcohol use. Family History:       Problem Relation Age of Onset    Heart Disease Mother     Heart Attack Mother     Glaucoma Sister     Diabetes Sister     Diabetes Brother     Kidney Disease Brother     Blindness Brother     Stroke Maternal Grandmother     Stroke Maternal Grandfather     Colon Cancer Neg Hx     Colon Polyps Neg Hx     Liver Cancer Neg Hx     Liver Disease Neg Hx     Esophageal Cancer Neg Hx     Rectal Cancer Neg Hx     Stomach Cancer Neg Hx            Physical Exam:    Vitals: BP (!) 159/69 Comment: DIALYSIS   Pulse 85 Comment: DIALYSIS   Temp 97.3 °F (36.3 °C) (Temporal)   Resp 20   Ht 4' 11\" (1.499 m)   Wt 136 lb 11.2 oz (62 kg)   SpO2 100%   BMI 27.61 kg/m²     Constitutional  well developed, well nourished. Eyes  conjunctiva normal.  Pupils not tested  Ear, nose, throat -hearing reduced to finger rub No scars, masses, or lesions over external nose or ears, no atrophy of tongue  Neck-symmetric, no masses noted, no jugular vein distension  Respiration- chest wall appears symmetric, good expansion,   normal effort without use of accessory muscles  Musculoskeletal  no significant wasting of muscles noted, no bony deformities  Extremities-no clubbing, cyanosis or edema  Skin  warm, dry, and intact. No rash, erythema, or pallor.   Psychiatric  mood, affect, and behavior appear normal.      Neurological exam Awake, slowed fluent oriented x 2 following simple commands  Able to show two fingers on hand conversant and slow  Attention and concentration appear mildly impaired  Recent and remote memory appears impaired  Speech normal without dysarthria  No clear issues with language of fund of knowledge    Cranial Nerve Exam   CN II- Visual fields unable to see right eye  CN III, IV,VI-EOMI, No nystagmus, conjugate eye movements, no ptosis  CN V-sensation intact to LT over face  CN VII-no facial assymetry  CN VIII-Hearing reduced to finger rub  CN IX and X- Palate not tested  CN XI-not test shoulder shrug  CN XII-Tongue midline with no fasciculations or fibrillations    Motor Exam  antigravity throughout upper extremities bilaterally, no cogwheeling, normal tone    Sensory Exam  Sensation grossly intact to light touch and temperature upper and lower extremities bilaterally    Reflexes   Not tested    Tremors- no tremors in hands or head noted    Gait  Not tested    Coordination  Finger to nose-slowed        CBC:   Recent Labs     02/25/21  0247 02/26/21  0151 02/27/21  0133   WBC 4.2* 4.3* 3.9*   HGB 10.3* 9.7* 9.9*   * 111* 109*       BMP:    Recent Labs     02/25/21  0247 02/26/21  0151 02/27/21  0133    142 141   K 4.1 3.8 4.2   CL 93* 96* 96*   CO2 27 30* 33*   BUN 34* 19 31*   CREATININE 5.0* 3.5* 4.7*   GLUCOSE 197* 153* 113*       Hepatic: No results for input(s): AST, ALT, ALB, BILITOT, ALKPHOS in the last 72 hours. Lipids: No results for input(s): CHOL, HDL in the last 72 hours. Invalid input(s): LDLCALCU    INR: No results for input(s): INR in the last 72 hours.     MRI BRAIN WO CONTRAST [[id number]]    Resulted: 02/27/21 0720    Updated: 02/27/21 0722    Narrative:     EXAMINATION: MRI BRAIN WO CONTRAST 2/27/2021 7:16 AM   HISTORY: Speech difficulty   COMPARISON: CT head without contrast 2/19/2021   Technical: Multiplanar, multisequence imaging was performed through the brain without the use of IV contrast.   Note: Dr. Alok Valdovinos signed medical necessity for patient to have MRI. FINDINGS:   Extensive motion severely limits evaluation. There is no diffusion restriction to suggest acute ischemia. Evaluation of the craniocervical junction is severely limited but   appears grossly normal.   There is no evidence of acute intracranial hemorrhage or midline   shift. The ventricles appear normal in configuration. Normal signal void is   seen in the visualized carotid and basilar arteries. There is thinning of the left lens. Paranasal sinuses and mastoid air   cells appear clear. There is abnormal T2 signal on the left at the level of C1-C2,   etiology unclear. Extensive periventricular and subcortical FLAIR signal abnormalities   are identified, a nonspecific finding most often seen as sequela of   chronic microvascular ischemia. Impression:     1. Severely limited exam without evidence of acute ischemia. Sequela   of chronic microvascular ischemia. 2. Abnormal T2 signal at C1-C2 on the left of uncertain significance. This is poorly evaluated by MRI.    Signed by Dr Motley [facility] on 2/27/2021 7:20 AM         Assessment and Plan     AMS-suspect multifactorial etiologu-metabolic/medications/respiratory/hepatic dysfunction     Non focal exam  MRI brain severely limited but no acute changes-abnormal signal C1-2-try get CT C spine-as doubt will be able to get appropriative images from [address] to [place] from neuro standpoint  F/u PRN        Please feel free to call with any questions   [phone] (cell phone)    Dr Rosa Smith certified in Neurology  Board Certified in [facility] 61 Neurophysiology  Fellowship Trained in [facility] 61 Neurophysiology

## 2021-02-27 NOTE — CARE COORDINATION
Spoke with patient regarding MD orders for [facility] services. Patient agreeable and has chosen [facility]. Referral Faxed. [address] [place] [address] [phone]. [phone]. Please notify [facility] when patient discharges and fax DC Summary,  DC med list and any new [facility] orders. The Patient and/or patient representative was provided with a choice of provider and agrees   with the discharge plan. [x] Yes [] No    Freedom of choice list was provided with basic dialogue that supports the patient's individualized plan of care/goals, treatment preferences and shares the quality data associated with the providers.  [x] Yes [] No  Electronically signed by Rose Olszewski on 2/27/2021 at 3:48 PM

## 2021-02-27 NOTE — PROGRESS NOTES
[facility] Progress Note    Patient:  Grzegorz Wang  YOB: 1960  Date of Service: 2/27/2021  MRN: [id number]   Acct: [de-identified]   Primary Care Physician: RAMONA Hammond  Advance Directive: Full Code  Admit Date: 2/19/2021       Hospital Day: 8        CHIEF COMPLAINT:    Chief Complaint   Patient presents with    Diarrhea     pt missed dialysis on Thursday.  Failure To Thrive       2/27/2021 9:41 AM  Subjective / Interval History:   02/27/2021  Patient seen and examined this AM.  Doing well. AOx3 this AM.  Laying comfortably in bed no acute distress. Denies any acute complaints or distress at this time. No acute changes or acute overnight event reported. 02/26/2021  Patient seen and examined. Alert only to self. More awake and alert this a.m. Denies any acute complaints or distress at this time. Endorses overall improvement. Patient appears to be having visible intermittent speech difficulty. Acuity unclear given patient has been extremely lethargic and confused throughout hospital course. Follow-up MRI brain without contrast rule out any underlying CVA  Cognitive eval  Consider neurology consult    02/25/2021  Patient seen and examined this AM.  Mental status improved. More awake and alert this a.m. Endorses overall improvement. No acute changes or acute overnight event reported. Patient laying comfortably in bed in no apparent acute distress. 02/24/2021  No acute changes or acute overnight event reported. Patient gradually improving. More awake and alert this a.m. Laying comfortably in bed in no acute distress. Denies any other acute complaints or distress at this time. 02/23/2021  Patient seen and examined. Doing well. No new complaints. No acute changes or acute overnight event reported.   Mental status improved tolerating HD.    02/22/2021 Patient seen and examined this AM.  More awake and alert to the same. No new complaints. No acute changes or acute overnight events reported. Patient oriented to self. Continues to have some intermittent groaning. No specific localized pain reported or identified. 02/21/2021  Patient seen and examined this AM.  Reportedly agitated with continued moaning however with no specific complaint overnight. No changes in mental status. Patient more awake and alert this a.m. though. Laying comfortably in bed in no apparent acute distress. 02/20/2021  Patient seen and and examined. Continues to be confused with intermittent jerky movements in upper extremity. No other acute changes or acute overnight event reported. Tolerated HD yesterday, and currently receiving a second session of HD. Review of Systems:   Review of Systems   Unable to perform ROS: Mental status change     DIET CARB CONTROL;  Renal    Intake/Output Summary (Last 24 hours) at 2/27/2021 0941  Last data filed at 2/26/2021 1450  Gross per 24 hour   Intake 210 ml   Output    Net 210 ml       Medications:   dextrose 50 mL/hr (02/20/21 0054)     Current Facility-Administered Medications   Medication Dose Route Frequency Provider Last Rate Last Admin    carvedilol (COREG) tablet 12.5 mg  12.5 mg Oral BID WC Jihan Lubin MD   12.5 mg at 02/26/21 1703    losartan (COZAAR) tablet 50 mg  50 mg Oral BID Jihan Lubin MD   50 mg at 02/26/21 2100    famotidine (PEPCID) tablet 10 mg  10 mg Oral Daily Jihan Lubin MD   10 mg at 02/26/21 4933    darbepoetin angie-polysorbate (ARANESP) injection 60 mcg  60 mcg Subcutaneous Weekly Guillermo Sandoval MD   60 mcg at 02/20/21 1247    meropenem (MERREM) 500 mg in sterile water 10 mL IV syringe  500 mg Intravenous Q24H Jihan Lubin MD   500 mg at 02/26/21 1816    vancomycin (VANCOCIN) intermittent dosing (placeholder)   Other RX Alexys Krishna MD  heparin (porcine) injection 5,000 Units  5,000 Units Subcutaneous 3 times per day Nadeem Yoo MD   5,000 Units at 02/27/21 0446    sodium chloride flush 0.9 % injection 10 mL  10 mL Intravenous 2 times per day Nadeem Yoo MD   10 mL at 02/26/21 2100    sodium chloride flush 0.9 % injection 10 mL  10 mL Intravenous PRN Nadeem Yoo MD             dextrose 50 mL/hr (02/20/21 0054)      carvedilol  12.5 mg Oral BID WC    losartan  50 mg Oral BID    famotidine  10 mg Oral Daily    darbepoetin angie-polysorbate  60 mcg Subcutaneous Weekly    meropenem (MERREM) 500 mg in SWFI 10 mL IV syringe  500 mg Intravenous Q24H    vancomycin (VANCOCIN) intermittent dosing (placeholder)   Other RX Placeholder    ipratropium-albuterol  1 ampule Inhalation Q4H WA    aspirin EC  81 mg Oral Daily    sevelamer  800 mg Oral Daily    insulin lispro  0-6 Units Subcutaneous TID     insulin lispro  0-3 Units Subcutaneous Nightly    heparin (porcine)  5,000 Units Subcutaneous 3 times per day    sodium chloride flush  10 mL Intravenous 2 times per day     oxyCODONE-acetaminophen, albuterol, glucose, dextrose, glucagon (rDNA), dextrose, potassium chloride **OR** potassium alternative oral replacement **OR** potassium chloride, promethazine **OR** ondansetron, magnesium hydroxide, sodium chloride flush  DIET CARB CONTROL;  Renal       Labs:   CBC with DIFF:  Recent Labs     02/25/21  0247 02/26/21  0151 02/27/21  0133   WBC 4.2* 4.3* 3.9*   RBC 3.35* 3.11* 3.21*   HGB 10.3* 9.7* 9.9*   HCT 33.5* 30.2* 31.8*   .0* 97.1 99.1*   MCH 30.7 31.2* 30.8   MCHC 30.7* 32.1* 31.1*   RDW 17.4* 17.1* 17.2*   * 111* 109*   MPV 9.9 9.9 9.8   NEUTOPHILPCT 70.5* 71.4* 63.1   LYMPHOPCT 12.7* 12.6* 16.5*   MONOPCT 13.7* 13.7* 14.2*   EOSRELPCT 2.4 1.9 5.4*   BASOPCT 0.2 0.2 0.5   NEUTROABS 3.0 3.1 2.4   LYMPHSABS 0.5* 0.5* 0.6*   MONOSABS 0.60 0.60 0.60   EOSABS 0.10 0.10 0.20   BASOSABS 0.00 0.00 0.00 CMP/BMP:  Recent Labs     02/25/21  0247 02/26/21  0151 02/27/21  0133    142 141   K 4.1 3.8 4.2   CL 93* 96* 96*   CO2 27 30* 33*   ANIONGAP 17 16 12   GLUCOSE 197* 153* 113*   BUN 34* 19 31*   CREATININE 5.0* 3.5* 4.7*   LABGLOM 9* 13* 9*   CALCIUM 10.0 9.5 9.6         CRP:    No results for input(s): CRP in the last 72 hours. Sed Rate:    No results for input(s): SEDRATE in the last 72 hours. HgBA1c:  No components found for: HGBA1C  FLP:    Lab Results   Component Value Date    TRIG 65 02/19/2021    HDL 61 06/09/2020    LDLCALC 25 06/09/2020    LDLDIRECT 120 07/28/2015     TSH:  No results found for: TSH  Troponin T: No results for input(s): TROPONINI in the last 72 hours. Pro-BNP: No results for input(s): BNP in the last 72 hours. INR:   No results for input(s): INR in the last 72 hours. ABGs:   Lab Results   Component Value Date    PHART 7.340 02/19/2021    PO2ART 72.0 02/19/2021    QFC6KXZ 46.0 02/19/2021     UA:No results for input(s): NITRITE, COLORU, PHUR, LABCAST, WBCUA, RBCUA, MUCUS, TRICHOMONAS, YEAST, BACTERIA, CLARITYU, SPECGRAV, LEUKOCYTESUR, UROBILINOGEN, BILIRUBINUR, BLOODU, GLUCOSEU, AMORPHOUS in the last 72 hours. Invalid input(s): Cintia Chimes      Culture Results:    No results for input(s): CXSURG in the last 720 hours. Blood Culture Recent:   Recent Labs     02/19/21  1154   BC No growth after 5 days of incubation. Cultures:   No results for input(s): CULTURE in the last 72 hours. No results for input(s): BC, Ireravi Cassis in the last 72 hours. No results for input(s): CXSURG in the last 72 hours. No results for input(s): MG, PHOS in the last 72 hours. No results for input(s): AST, ALT, ALB, BILITOT, ALKPHOS, ALB in the last 72 hours.       RAD:   Ct Head Wo Contrast    Result Date: 2/19/2021 Examination. CT HEAD WO CONTRAST 2/19/2021 12:55 PM History: Altered mental status. DLP: 1305 mGycm. The CT scan of the head is performed without intravenous contrast enhancement. The images are acquired in axial plane and subsequent reconstruction in coronal and sagittal planes. The comparison is made with the previous study dated 9/1/2020. There is no evidence of mass. No midline shift. There is no evidence of an intracranial hemorrhage or hematoma. Moderately dilated ventricles, basal cistern and the cortical sulci are similar to the previous study suggesting a chronic volume loss. A few scattered areas of chronic white matter ischemia in the centrum semiovale bilaterally are stable. The gray-white matter differentiation is maintained. A partially empty sella turcica is seen. The images reviewed in bone window show no acute bony abnormality. The visualized paranasal sinuses and mastoid air cells are clear. A curvilinear metallic density in the lateral margin of the left globe/orbit is similar to the previous study. No acute intracranial abnormality.  Signed by Dr Ed Rosenberg on 2/19/2021 2:38 PM    Ct Chest W Contrast    Result Date: 2/19/2021 EXAM: CT CHEST W CONTRAST -- 2/19/2021 12:55 PM HISTORY: 56 years, Female, abnormal chest x-ray COMPARISON: 2/19/2021 DLP: 769.5 mGy cm. Automated exposure control was utilized to minimize patient radiation dose. TECHNIQUE: Enhanced  CT images of the chest obtained with multiplanar reformats. FINDINGS: AIRWAYS/PULMONARY PARENCHYMA: The central airways are midline and patent. Expiratory phase of imaging. Small right pleural effusion with compressive enhancing breast atelectasis involving the right middle and lower lobes. Right upper lobe with a pleural-based 1.7 x 2.0 cm opacity, which could represent round atelectasis, scarring or nodule. VASCULATURE: Thoracic aorta is normal in course and caliber. Mild calcified aortic atherosclerosis. Normal pulmonary artery caliber. No large central pulmonary artery filling defect on this non-CTA exam. Left subclavian vein with short segment severe stenosis on axial image 29. This is just lateral to the stent. There are left chest wall collaterals suggesting that this is a chronic finding. CARDIAC:  Borderline heart size. No pericardial effusion. Scattered coronary artery calcifications. MEDIASTINUM: There is no mediastinal or hilar lymphadenopathy by CT size criteria. Esophagus has normal coarse, caliber and wall thickness. EXTRATHORACIC: The visualized portions of the thyroid gland are unremarkable. No thoracic inlet or axillary adenopathy. Body wall edema. Left chest wall collaterals. INCLUDED UPPER ABDOMEN: The liver appears prominent low density, which could be due to fatty infiltration or phase of contrast. Cholecystectomy clips. No bile duct dilation. Small ascites. There is wedge-shaped low-density in the spleen, concerning for age-indeterminate splenic infarct. Visualized portion of the pancreas and adrenal glands appear within normal limits. Atrophic kidneys partially visualized. OSSEOUS: Mild degenerative changes in the spine. No acute or aggressive bony finding. 1. Right pleural effusion with compressive atelectasis of the right middle and lower lobes. 2. Right upper lobe with a pleural-based 1.7 x 2.0 cm opacity, which could represent round atelectasis, scarring or nodule. Recommend follow-up CT chest in 3 months per Fleischner criteria. 3. Left subclavian vein with a short segment severe stenosis, just lateral to the stent. Left chest wall collaterals suggest chronicity. 4. Borderline heart size with scattered coronary artery calcifications. 5. Wedge-shaped low-density in the spleen concerning for age-indeterminate splenic infarct. 6. Low-density of the liver which could be seen with fatty infiltration or exaggerated due to phase of contrast. Small volume ascites.  Signed by Dr Stu Drew on 2/19/2021 2:42 PM    Ct Abdomen Pelvis W Iv Contrast Additional Contrast? None    Result Date: 2/19/2021 EXAM: CT ABDOMEN PELVIS W IV CONTRAST -- 2/19/2021 12:55 PM HISTORY: 56 years, Female, markedly elevated lipase, abdominal pain, diarrhea, ESRD on hemodialysis COMPARISON: No existing relevant imaging studies available DLP: 769.5 mGy cm. Automated exposure control was utilized to minimize patient radiation dose. TECHNIQUE: Enhanced CT images obtained of the abdomen and pelvis with multiplanar reformats. FINDINGS: LUNG BASES: Right pleural effusion with compressive atelectasis. Underlying inferior heart size with scattered coronary artery calcifications. LIVER: Diffuse low-density of the liver concerning for fatty liver. Liver appears mildly enlarged. Patent portal and hepatic veins. GALLBLADDER and BILARY: Cholecystectomy clips. No bile duct dilation. PANCREAS: Normal enhancement of the pancreas. There is ascites and mesenteric edema throughout the abdomen, which limited evaluation of the peripancreatic fat. SPLEEN: There is a wedge-shaped hypodensity in the spleen concerning for infarct. Splenic vein appears grossly patent. Normal caliber splenic artery. ADRENALS: No adrenal mass. URINARY: Atrophic kidneys. No hydronephrosis. Renal vascular calcifications. No convincing nephrolithiasis. Urinary bladder is underdistended, which limits evaluation. Grossly normal CT appearance of the uterus. Adnexa are not discretely identified. PERITONEUM: No ascites/free fluid. No pneumoperitoneum. BOWEL: Stomach and duodenum have normal course and caliber. No abnormally dilated loops of bowel or bowel wall thickening. Normal short appendix or appendiceal stump on axial images 61-57. RETROPERITONEUM:  Aorta normal in course and caliber with calcified atherosclerosis. Celiac and superior mesenteric arteries patent. Bilateral renal arteries diminutive. Inferior mesenteric artery opacified.  Heavily calcified iliac arteries, limited in evaluation on this non-CTA exam. Scattered retroperitoneal lymph nodes, not enlarged by CT size criteria, which may be reactive. ABDOMINAL WALL: Significant body wall edema. Prominent vessel in the partially visualized left distal upper arm, possibly fistula. OSSEOUS: Subacute to chronic right superior pubic ramus, right inferior pubic ramus and left inferior pubic rami fractures. 1. Ascites and mesenteric edema Limited evaluation of the peripancreatic fat. Normal enhancement of the pancreas. 2. Wedge-shaped hypodensity in the spleen concerning for age-indeterminate infarct. Splenic vein and artery appear patent and normal caliber. 3. Enlarged low-density liver concerning for fatty liver. 4. Right pleural effusion, ascites, body wall edema suggests fluid overload. 5. Heavily calcified atherosclerosis. 6. Subacute to chronic appearing pubic rami fractures. 7. See separately dictated CT chest of the same day. Signed by Dr Michelle Vaughan on 2/19/2021 2:54 PM    Xr Chest Portable    Result Date: 2/19/2021  XR CHEST PORTABLE 2/19/2021 10:18 AM HISTORY: Shortness of breath COMPARISON: Chest x-ray dated 10/10/2020. FINDINGS: Mild cardiomegaly which is stable. Bilateral coarsened interstitial markings with large layering right pleural effusion. No pneumothorax. Vascular stent along the left brachiocephalic vein. No acute bony abnormality. 1. Interstitial edema with large layering right pleural effusion. Signed by Dr Tyra Mojica on 2/19/2021 11:44 AM      Recent 2D ECHOCARDIOGRAM  05/08/2020    Summary   LV is normal in size with normal systolic function. LV ejection fraction   estimated at 50-60%.  Mild concentric LVH. Probable grade 2 diastolic dysfunction.   RV is normal in size with normal systolic function.   Mild to moderate left atrial enlargement.   Normal right atrial size.   Aortic valve is trileaflet with mild leaflet thickening. Normal opening.   No stenosis or regurgitation noted.   Mitral valve is mild to moderately thickened with normal leaflet mobility.  Likely Severe (3-4+) mitral regurgitation is present. No stenosis.   Mild tricuspid regurgitation.  RVSP estimated at 50 mmHg.      Signature    ----------------------------------------------------------------   Electronically signed by Haile Oliver MD(Interpreting physician)   on 05/08/2020 07:30 PM   ----------------------------------------------------------------        Objective:   Vitals:   /72   Pulse 79   Temp 98.2 °F (36.8 °C) (Temporal)   Resp 16   Ht 4' 11\" (1.499 m)   Wt 136 lb 11.2 oz (62 kg)   SpO2 96%   BMI 27.61 kg/m²     Patient Vitals for the past 24 hrs:   BP Temp Temp src Pulse Resp SpO2 Weight   02/27/21 0741 138/72 98.2 °F (36.8 °C) Temporal 79 16 96 %    02/27/21 0007 128/65 97 °F (36.1 °C) Temporal 74 14 93 % 136 lb 11.2 oz (62 kg)   02/26/21 1834      95 %    02/26/21 1816 (!) 161/75 97.1 °F (36.2 °C) Temporal 79 16 94 %    02/26/21 1129 122/70 97.9 °F (36.6 °C) Temporal 74 20 94 %        24HR INTAKE/OUTPUT:      Intake/Output Summary (Last 24 hours) at 2/27/2021 0941  Last data filed at 2/26/2021 1450  Gross per 24 hour   Intake 210 ml   Output    Net 210 ml       Physical Exam  Vitals signs and nursing note reviewed. Constitutional:       General: She is not in acute distress. Appearance: Normal appearance. She is ill-appearing. She is not toxic-appearing or diaphoretic. HENT:      Head: Normocephalic and atraumatic. Right Ear: External ear normal.      Left Ear: External ear normal.      Nose: Nose normal. No congestion or rhinorrhea. Eyes:      General: No scleral icterus. Right eye: No discharge. Left eye: No discharge. Extraocular Movements: Extraocular movements intact. Conjunctiva/sclera: Conjunctivae normal.   Neck:      Musculoskeletal: Normal range of motion and neck supple. No neck rigidity or muscular tenderness. Vascular: No carotid bruit. Cardiovascular:      Rate and Rhythm: Normal rate and regular rhythm. Pulses: Normal pulses. Heart sounds: Normal heart sounds. No friction rub. No gallop. Pulmonary:      Effort: Pulmonary effort is normal. No respiratory distress. Breath sounds: Normal breath sounds. No stridor. No wheezing, rhonchi or rales. Chest:      Chest wall: No tenderness. Abdominal:      General: Bowel sounds are normal. There is no distension. Palpations: Abdomen is soft. Tenderness: There is no abdominal tenderness. There is no guarding or rebound. Comments: Diffuse tenderness to palpation. No guarding, rigidity, rebound tenderness noted/elicited   Musculoskeletal: Normal range of motion. General: No swelling, tenderness, deformity or signs of injury. Right lower leg: Edema present. Left lower leg: Edema present. Skin:     General: Skin is warm and dry. Capillary Refill: Capillary refill takes less than 2 seconds. Coloration: Skin is not jaundiced or pale. Findings: No bruising, erythema, lesion or rash. Neurological:      General: No focal deficit present. Mental Status: She is alert. She is disoriented. Cranial Nerves: No cranial nerve deficit. Psychiatric:         Mood and Affect: Mood normal.      Comments: Unable to assess judgment and thought content.            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 Alina Pugh a 56 y.o. female with a history of DMT2, ESRD, on HD, CHF, presenting to [address] [place] [address] ED (2021) on account of diarrhea, with an associated abdominal/back pain, generalized weakness and lethargy.     Patient reportedly has missed 1 session of HD, due to the symptoms noted above.     Patient reportedly is on baseline home oxygen.     Initial work-up significant for;  AB.3 4//72/20 4.8  Hyperkalemia, with a potassium of 5.3  Initial BUN/creatinine of 77/7.2  Hypoglycemia with initial glucose of 67  Leukopenia, with WBC of 3.7  Lactic acid within lab reference range: 0.8  Markedly elevated lipase: 1,492  Mildly elevated ESR: 30 mm/hr  Elevated CRP: 7.45  Molecular respiratory panel with COVID-19 grossly negative  Patient admitted for further work-up and management.     Generalized weakness  Diarrhea  Altered mental status  Transaminitis  · Initial work-up (2021) significant for;  · AB.3 4/46/72/20 4.8  · Leukopenia, with WBC of 3.7  · Lactic acid within lab reference range: 0.8  · Markedly elevated lipase: 1,492 (2021)  · Mildly elevated ESR: 30 mm/hr  · Elevated CRP: 7.45  · Mildly elevated ALT/AST: 55/54  · Elevated alkaline phosphatase: 353  · Molecular respiratory panel with COVID-19 grossly negative -2021  · Lipase level improved  · Blood cultures no growth to date  · Mildly elevated Procalcitonin level: 0.24 (2021)  · Elevated CRP: 7.45 [0.00 - 0.50 mg/dL]  · Mildly elevated ESR  · Elevated GGT  · Elevated serum ferritin  · Triglyceride level within lab reference range  · Ammonia level  · Acute viral hepatitis panel grossly negative  · HIV rapid 1 & 2 screen negative  · Acetaminophen level within lab reference range · Ethanol level < 10  Mg/dL  · Elevated GGT level  · LDH within lab reference range  · Stool for C. difficile antigen toxin as well as ova and parasites, however patient with no further diarrhea. · Empiric antibiotic (Vancomycin and Meropenem),   · Blood cultures no growth to date  · Follow-up MRSA nares screening  · Consider de-escalating antibiotics  · CT head without contrast, with no acute intracranial abnormality. · CT chest with contrast (02/19/2021): Impression: Right pleural effusion with compressive atelectasis of the right middle and lower lobes. . Right upper lobe with a pleural-based 1.7 x 2.0 cm opacity, which could represent round atelectasis, scarring or nodule. Recommend follow-up CT chest in 3 months per Fleischner criteria. Left subclavian vein with a short segment severe stenosis, just lateral to the stent. Left chest wall collaterals suggest chronicity. . Borderline heart size with scattered coronary artery calcifications. . Wedge-shaped low-density in the spleen concerning for age-indeterminate splenic infarct. Low-density of the liver which could be seen with fatty infiltration or exaggerated due to phase of contrast. Small volume ascites. · CT abdomen pelvis with IV contrast (02/19/2021): Impression: Ascites and mesenteric edema Limited evaluation of the peripancreatic fat. Normal enhancement of the pancreas. Wedge-shaped hypodensity in the spleen concerning for age-indeterminate infarct. Splenic vein and artery appear patent andmnormal caliber. Enlarged low-density liver concerning for fatty liver. Right pleural effusion, ascites, body wall edema suggests fluid overload. Heavily calcified atherosclerosis. Subacute to chronic appearing pubic rami fractures. · Abdominal complete (02/21/2021): Impression: Liver does not appear fatty by ultrasound. Low density appearance on prior CT may have been related to phase of contrast. Cholecystectomy. Common bile duct measures up to 1 cm, similar compared to 2/19/2021. This can be seen as reservoir effect after cholecystectomy. Bilateral renal atrophy. Right pleural effusion. Small volume ascites  · GI following recommendations much appreciated      Acute metabolic/toxic encephalopathy  Intermittent speech difficulties  · MRI brain without contrast (02/26/2021): Severely limited exam without evidence of acute ischemia. Sequela of chronic microvascular ischemia. Abnormal T2 signal at C1-C2 on the left of uncertain significance. This is poorly evaluated by MRI. · SLP Cognitive eval  · Seen by Neurology   · Pending recommended CT Cervical spine WO Con (02/27/2020):      Subacute to chronic pubic rami fractures. · Continue supportive symptomatic management including pain management as needed  · Seen by Orthopedic surgeryappreciate recommendations  · Continue PT OT      History of ESRD, on scheduled HD Tues_Thur_Sat  · Hyperkalemia, resolved  · Initial BUN/creatinine of 77/7.2  · Reportedly missed 1 session of HD (02/18/2021), prior to presentation  · Nephrology followingappreciate recommendations  · Continue scheduled HD  · Continue management as per nephrology rec        History of insulin-dependent Diabetes Mellitus II  · Hypoglycemic on presentation  · Hemoglobin A1C: 6.7% (02/19/2021)  · Inpatient Regimens to include;  ? -Hold basal Insulin Glargine (Lantus), for now, given concern for hypoglycemia  ? - Insulin Lispro (Humalog) on a Low dose sliding scale  · Monitor POC glucose, and adjust insulin regimen accordingly based on daily insulin requirement.         Continue management of other chronic medical conditions - see above and orders. Advance Directive: Full Code    DIET CARB CONTROL;  Renal Consults Made:   IP CONSULT TO NEPHROLOGY  IP CONSULT TO NEPHROLOGY  IP CONSULT TO SOCIAL WORK  IP CONSULT TO GI  PHARMACY TO DOSE VANCOMYCIN  IP CONSULT TO ORTHOPEDIC SURGERY  IP CONSULT TO REHAB/TCU ADMISSION COORDINATOR  IP CONSULT TO NEUROLOGY    DVT prophylaxis: Heparin      Discharge planning:   Mental status improving  Continue current management and work-up as noted above. Patient appears to be having visible intermittent speech difficulty. Acuity unclear given patient has been extremely lethargic and confused throughout hospital course. Follow-up MRI brain without contrast rule out any underlying CVA  Cognitive eval  Consider Neurology consult      Time Spent is 25 mins in the examination, evaluation, counseling and review of medications, assessment and plan.      Electronically signed by   Nay Azevedo MD, MPH,   Internal Medicine Hospitalist   2/27/2021 9:41 AM

## 2021-02-27 NOTE — PROGRESS NOTES
Speech Language Pathology  Facility/Department: [facility] 3 SAMEER/VAS/MED  Initial Assessment    NAME: Yoanna David  : 1960  MRN: [id number]    Date of Eval: 2021  Evaluating Therapist: Crystal Person    Visit Diagnoses       Codes    ESRD on hemodialysis [facility])    -  Primary N18.6, Z99.2    Diarrhea, unspecified type     R19.7    Other fatigue     R53.83    General weakness     R53.1    Hypoglycemia     E16.2    Tobacco abuse     Z72.0    Chronic respiratory failure with hypoxia ([address]     J96.11    Pleural effusion     J90            Past Medical History: has a past medical history of Blind right eye, Blindness of one eye, CHF (congestive heart failure) ([address] CKD (chronic kidney disease), stage IV ([address] Diabetes ([address] Diabetes mellitus with ophthalmic manifestations, Glaucoma, Hemodialysis patient ([address] Hypertension, Obesity, Renal osteodystrophy, Smoker, and Type II or unspecified type diabetes mellitus with renal manifestations, uncontrolled(250.42). Past Surgical History:  has a past surgical history that includes Cholecystectomy;  section; Cataract removal; eye surgery; Tympanostomy tube placement (Bilateral); Carpal tunnel release; Dialysis fistula creation (Left, 4/23/15 [facility]); vascular surgery (Left, 5/11/15 [facility]); vascular surgery (Left, 5/28/15 [facility]); vascular surgery (Left, 6/15/15 [facility]); vascular surgery (05/15/2017); vascular surgery (2018); Colonoscopy (N/A, 3/9/2018); and vascular surgery (2019). Primary Complaint: Altered mental status    Subjective: Patient was alert and cooperative with all evaluation tasks.      Assessment: Portions of the MMSE completed. Patient was unable to complete the reading portion or design, secondary to not having her glasses. Patient scoring a 17 without the full completion of the screen/assessment. If patient would have scored perfect on the visual portions she would have scored a 19 which still places her in the moderate degree of impairment. This means patient may require supervision 24 hours of the day. Patient completed orientation of time portion of the MMSE with a score of 1/5. Patient only oriented to season. Orientation to place was completed with a score of 1/5. Patient only oriented to state after SLP told her the town. Patient stating she was in [facility].     Repetition of 3 words and a phrase were completed with 100% accuracy. Mental manipulation task completed with a score of 3/5. Patient was asked to recall 3 words after a 2 minute delay and distraction. Patient able to recall 2/3 words. Patient was able to follow 3 step command given with 100% accuracy. Written expression task also completed with a score of 1/1      Patient was given a simple scenario related to medication management. Patient was unable to provide correct solution. Patient was able to give correct solution for a more simple problem solving scenario related to physicians appointment and time. Overall impression: Patient present with moderate cognitive deficits in areas of memory, orientation, problem solving/reasoning, and safety awareness. Patient will need to continue speech services upon discharge to address deficits.              Electronically signed by ROYCE Welch on 2/27/2021 at 3:49 PM

## 2021-02-27 NOTE — DISCHARGE SUMMARY
Tanvir Frederick  :  1960  MRN:  [id number]    Admit date:  2021  Discharge date:  2021       Admitting Physician:  Tirso Jones MD    Advance Directive: Full Code    Consults Made:   IP CONSULT TO NEPHROLOGY  IP CONSULT TO NEPHROLOGY  IP CONSULT TO SOCIAL WORK  IP CONSULT TO GI  PHARMACY TO DOSE VANCOMYCIN  IP CONSULT TO ORTHOPEDIC SURGERY  IP CONSULT TO REHAB/TCU ADMISSION COORDINATOR  IP CONSULT TO NEUROLOGY      Primary Care Physician:  RAMONA Hammer    Discharge Diagnoses: Active Problems:    Bilateral pubic rami fractures, closed, initial encounter ([facility] [address]    Type 2 diabetes mellitus with ophthalmic complication, with long-term current use of insulin ([facility])    HTN (hypertension)    ESRD (end stage renal disease) on dialysis [facility])    Altered mental state    Weakness  Resolved Problems:    Elevated lipase    Acute diarrhea        Pertinent Labs:  CBC with DIFF:  Recent Labs     21  0133   WBC 4.2* 4.3* 3.9*   RBC 3.35* 3.11* 3.21*   HGB 10.3* 9.7* 9.9*   HCT 33.5* 30.2* 31.8*   .0* 97.1 99.1*   MCH 30.7 31.2* 30.8   MCHC 30.7* 32.1* 31.1*   RDW 17.4* 17.1* 17.2*   * 111* 109*   MPV 9.9 9.9 9.8   NEUTOPHILPCT 70.5* 71.4* 63.1   LYMPHOPCT 12.7* 12.6* 16.5*   MONOPCT 13.7* 13.7* 14.2*   EOSRELPCT 2.4 1.9 5.4*   BASOPCT 0.2 0.2 0.5   NEUTROABS 3.0 3.1 2.4   LYMPHSABS 0.5* 0.5* 0.6*   MONOSABS 0.60 0.60 0.60   EOSABS 0.10 0.10 0.20   BASOSABS 0.00 0.00 0.00       CMP/BMP:  Recent Labs     21  02421  01521  0133    142 141   K 4.1 3.8 4.2   CL 93* 96* 96*   CO2 27 30* 33*   ANIONGAP 17 16 12   GLUCOSE 197* 153* 113*   BUN 34* 19 31*   CREATININE 5.0* 3.5* 4.7*   LABGLOM 9* 13* 9*   CALCIUM 10.0 9.5 9.6         CRP:  No results for input(s): CRP in the last 72 hours. Sed Rate:  No results for input(s): SEDRATE in the last 72 hours.       HgBA1c:  No components found for: HGBA1C  FLP:    Lab Results Component Value Date    TRIG 65 02/19/2021    HDL 61 06/09/2020    LDLCALC 25 06/09/2020    LDLDIRECT 120 07/28/2015     TSH:  No results found for: TSH  Troponin T: No results for input(s): TROPONINI in the last 72 hours. Pro-BNP: No results for input(s): BNP in the last 72 hours. INR: No results for input(s): INR in the last 72 hours. ABGs:   Lab Results   Component Value Date    PHART 7.340 02/19/2021    PO2ART 72.0 02/19/2021    LKW9DNP 46.0 02/19/2021     UA:No results for input(s): NITRITE, COLORU, PHUR, LABCAST, WBCUA, RBCUA, MUCUS, TRICHOMONAS, YEAST, BACTERIA, CLARITYU, SPECGRAV, LEUKOCYTESUR, UROBILINOGEN, BILIRUBINUR, BLOODU, GLUCOSEU, AMORPHOUS in the last 72 hours. Invalid input(s): Keren Iron      Culture Results:    No results for input(s): CXSURG in the last 720 hours. Blood Culture Recent:   Recent Labs     02/19/21  1154   BC No growth after 5 days of incubation. Cultures:   No results for input(s): CULTURE in the last 72 hours. No results for input(s): BC, Rosmery Humbles in the last 72 hours. No results for input(s): CXSURG in the last 72 hours. No results for input(s): MG, PHOS in the last 72 hours. No results for input(s): AST, ALT, ALB, BILITOT, ALKPHOS, ALB in the last 72 hours.         Significant Diagnostic Studies:   Ct Head Wo Contrast    Result Date: 2/19/2021 Examination. CT HEAD WO CONTRAST 2/19/2021 12:55 PM History: Altered mental status. DLP: 1305 mGycm. The CT scan of the head is performed without intravenous contrast enhancement. The images are acquired in axial plane and subsequent reconstruction in coronal and sagittal planes. The comparison is made with the previous study dated 9/1/2020. There is no evidence of mass. No midline shift. There is no evidence of an intracranial hemorrhage or hematoma. Moderately dilated ventricles, basal cistern and the cortical sulci are similar to the previous study suggesting a chronic volume loss. A few scattered areas of chronic white matter ischemia in the centrum semiovale bilaterally are stable. The gray-white matter differentiation is maintained. A partially empty sella turcica is seen. The images reviewed in bone window show no acute bony abnormality. The visualized paranasal sinuses and mastoid air cells are clear. A curvilinear metallic density in the lateral margin of the left globe/orbit is similar to the previous study. No acute intracranial abnormality.  Signed by Dr Marilu Urias on 2/19/2021 2:38 PM    Ct Chest W Contrast    Result Date: 2/19/2021 EXAM: CT CHEST W CONTRAST -- 2/19/2021 12:55 PM HISTORY: 56 years, Female, abnormal chest x-ray COMPARISON: 2/19/2021 DLP: 769.5 mGy cm. Automated exposure control was utilized to minimize patient radiation dose. TECHNIQUE: Enhanced  CT images of the chest obtained with multiplanar reformats. FINDINGS: AIRWAYS/PULMONARY PARENCHYMA: The central airways are midline and patent. Expiratory phase of imaging. Small right pleural effusion with compressive enhancing breast atelectasis involving the right middle and lower lobes. Right upper lobe with a pleural-based 1.7 x 2.0 cm opacity, which could represent round atelectasis, scarring or nodule. VASCULATURE: Thoracic aorta is normal in course and caliber. Mild calcified aortic atherosclerosis. Normal pulmonary artery caliber. No large central pulmonary artery filling defect on this non-CTA exam. Left subclavian vein with short segment severe stenosis on axial image 29. This is just lateral to the stent. There are left chest wall collaterals suggesting that this is a chronic finding. CARDIAC:  Borderline heart size. No pericardial effusion. Scattered coronary artery calcifications. MEDIASTINUM: There is no mediastinal or hilar lymphadenopathy by CT size criteria. Esophagus has normal coarse, caliber and wall thickness. EXTRATHORACIC: The visualized portions of the thyroid gland are unremarkable. No thoracic inlet or axillary adenopathy. Body wall edema. Left chest wall collaterals. INCLUDED UPPER ABDOMEN: The liver appears prominent low density, which could be due to fatty infiltration or phase of contrast. Cholecystectomy clips. No bile duct dilation. Small ascites. There is wedge-shaped low-density in the spleen, concerning for age-indeterminate splenic infarct. Visualized portion of the pancreas and adrenal glands appear within normal limits. Atrophic kidneys partially visualized. OSSEOUS: Mild degenerative changes in the spine. No acute or aggressive bony finding. 1. Right pleural effusion with compressive atelectasis of the right middle and lower lobes. 2. Right upper lobe with a pleural-based 1.7 x 2.0 cm opacity, which could represent round atelectasis, scarring or nodule. Recommend follow-up CT chest in 3 months per Fleischner criteria. 3. Left subclavian vein with a short segment severe stenosis, just lateral to the stent. Left chest wall collaterals suggest chronicity. 4. Borderline heart size with scattered coronary artery calcifications. 5. Wedge-shaped low-density in the spleen concerning for age-indeterminate splenic infarct. 6. Low-density of the liver which could be seen with fatty infiltration or exaggerated due to phase of contrast. Small volume ascites.  Signed by Dr Robert Freitas on 2/19/2021 2:42 PM    Ct Abdomen Pelvis W Iv Contrast Additional Contrast? None    Result Date: 2/19/2021 EXAM: CT ABDOMEN PELVIS W IV CONTRAST -- 2/19/2021 12:55 PM HISTORY: 56 years, Female, markedly elevated lipase, abdominal pain, diarrhea, ESRD on hemodialysis COMPARISON: No existing relevant imaging studies available DLP: 769.5 mGy cm. Automated exposure control was utilized to minimize patient radiation dose. TECHNIQUE: Enhanced CT images obtained of the abdomen and pelvis with multiplanar reformats. FINDINGS: LUNG BASES: Right pleural effusion with compressive atelectasis. Underlying inferior heart size with scattered coronary artery calcifications. LIVER: Diffuse low-density of the liver concerning for fatty liver. Liver appears mildly enlarged. Patent portal and hepatic veins. GALLBLADDER and BILARY: Cholecystectomy clips. No bile duct dilation. PANCREAS: Normal enhancement of the pancreas. There is ascites and mesenteric edema throughout the abdomen, which limited evaluation of the peripancreatic fat. SPLEEN: There is a wedge-shaped hypodensity in the spleen concerning for infarct. Splenic vein appears grossly patent. Normal caliber splenic artery. ADRENALS: No adrenal mass. URINARY: Atrophic kidneys. No hydronephrosis. Renal vascular calcifications. No convincing nephrolithiasis. Urinary bladder is underdistended, which limits evaluation. Grossly normal CT appearance of the uterus. Adnexa are not discretely identified. PERITONEUM: No ascites/free fluid. No pneumoperitoneum. BOWEL: Stomach and duodenum have normal course and caliber. No abnormally dilated loops of bowel or bowel wall thickening. Normal short appendix or appendiceal stump on axial images 61-57. RETROPERITONEUM:  Aorta normal in course and caliber with calcified atherosclerosis. Celiac and superior mesenteric arteries patent. Bilateral renal arteries diminutive. Inferior mesenteric artery opacified.  Heavily calcified iliac arteries, limited in evaluation on this non-CTA exam. Scattered retroperitoneal lymph nodes, not enlarged by CT size criteria, which may be reactive. ABDOMINAL WALL: Significant body wall edema. Prominent vessel in the partially visualized left distal upper arm, possibly fistula. OSSEOUS: Subacute to chronic right superior pubic ramus, right inferior pubic ramus and left inferior pubic rami fractures. 1. Ascites and mesenteric edema Limited evaluation of the peripancreatic fat. Normal enhancement of the pancreas. 2. Wedge-shaped hypodensity in the spleen concerning for age-indeterminate infarct. Splenic vein and artery appear patent and normal caliber. 3. Enlarged low-density liver concerning for fatty liver. 4. Right pleural effusion, ascites, body wall edema suggests fluid overload. 5. Heavily calcified atherosclerosis. 6. Subacute to chronic appearing pubic rami fractures. 7. See separately dictated CT chest of the same day. Signed by Dr Gamaliel Seaman on 2/19/2021 2:54 PM    Xr Chest Portable    Result Date: 2/19/2021  XR CHEST PORTABLE 2/19/2021 10:18 AM HISTORY: Shortness of breath COMPARISON: Chest x-ray dated 10/10/2020. FINDINGS: Mild cardiomegaly which is stable. Bilateral coarsened interstitial markings with large layering right pleural effusion. No pneumothorax. Vascular stent along the left brachiocephalic vein. No acute bony abnormality. 1. Interstitial edema with large layering right pleural effusion. Signed by Dr Natanael Jensen on 2/19/2021 11:44 AM        Narrative   EXAMINATION: MRI BRAIN WO CONTRAST 2/27/2021 7:16 AM   HISTORY: Speech difficulty   COMPARISON: CT head without contrast 2/19/2021   Technical: Multiplanar, multisequence imaging was performed through   the brain without the use of IV contrast.   Note: Dr. Radha Javier signed medical necessity for patient to have MRI. FINDINGS:   Extensive motion severely limits evaluation. There is no diffusion restriction to suggest acute ischemia.    Evaluation of the craniocervical junction is severely limited but   appears grossly normal. There is no evidence of acute intracranial hemorrhage or midline   shift. The ventricles appear normal in configuration. Normal signal void is   seen in the visualized carotid and basilar arteries. There is thinning of the left lens. Paranasal sinuses and mastoid air   cells appear clear. There is abnormal T2 signal on the left at the level of C1-C2,   etiology unclear. Extensive periventricular and subcortical FLAIR signal abnormalities   are identified, a nonspecific finding most often seen as sequela of   chronic microvascular ischemia.       Impression   1. Severely limited exam without evidence of acute ischemia. Sequela   of chronic microvascular ischemia. 2. Abnormal T2 signal at C1-C2 on the left of uncertain significance. This is poorly evaluated by MRI. Signed by Dr Martita Woods on 2/27/2021 7:20 AM                  Recent 2D ECHOCARDIOGRAM  05/08/2020    Summary   LV is normal in size with normal systolic function. LV ejection fraction   estimated at 50-60%.  Mild concentric LVH. Probable grade 2 diastolic dysfunction.   RV is normal in size with normal systolic function.   Mild to moderate left atrial enlargement.   Normal right atrial size.   Aortic valve is trileaflet with mild leaflet thickening. Normal opening.   No stenosis or regurgitation noted.   Mitral valve is mild to moderately thickened with normal leaflet mobility.   Likely Severe (3-4+) mitral regurgitation is present. No stenosis.   Mild tricuspid regurgitation.    RVSP estimated at 50 mmHg.      Signature    ----------------------------------------------------------------   Electronically signed by Cody Oliver MD(Interpreting physician)   on 05/08/2020 07:30 PM   ----------------------------------------------------------------           Hospital Course: Ms. Cabezas Isles 56 y. o. female with a history of DMT2, ESRD, on HD, CHF, presenting to [facility] ED (2021) on account of diarrhea, with an associated abdominal/back pain, generalized weakness and lethargy.     Patient reportedly has missed 1 session of HD, due to the symptoms noted above.     Patient reportedly is on baseline home oxygen.     Initial work-up significant for;  AB.3 // 4.8  Hyperkalemia, with a potassium of 5.3  Initial BUN/creatinine of 77/7.2  Hypoglycemia with initial glucose of 67  Leukopenia, with WBC of 3.7  Lactic acid within lab reference range: 0.8  Markedly elevated lipase: 1,492  Mildly elevated ESR: 30 mm/hr  Elevated CRP: 7.45  Molecular respiratory panel with COVID-19 grossly negative  Patient admitted for further work-up and management.     Generalized weakness  Deconditioned due to medical illness  Diarrhearesolved  Toxic/metabolic encephalopathy   Transaminitis  · Initial work-up (2021) significant for;  ? AB.3 //20 4.8  ? Leukopenia, with WBC of 3.7  ? Lactic acid within lab reference range: 0.8  ? Markedly elevated lipase: 1,492 (2021)  ? Mildly elevated ESR: 30 mm/hr  ? Elevated CRP: 7.45  ? Mildly elevated ALT/AST: 55/54  ? Elevated alkaline phosphatase: 353  ?  Molecular respiratory panel with COVID-19 grossly negative -2021  · Lipase level improved: 1,492 --> 565 --> 69 (2021)  · Blood cultures no growth to date  · Mildly elevated Procalcitonin level: 0.24 (2021)  · Elevated CRP: 7.45 [0.00 - 0.50 mg/dL]  · Mildly elevated ESR  · Elevated GGT  · Elevated serum ferritin  · Triglyceride level within lab reference range  · Ammonia level  · Acute viral hepatitis panel grossly negative  · HIV rapid 1 & 2 screen negative  · Acetaminophen level within lab reference range  · Ethanol level < 10  Mg/dL  · Elevated GGT level  · LDH within lab reference range · Stool for C. difficile antigen toxin as well as ova and parasites, however patient with no further diarrhea. · Empiric antibiotic (Vancomycin and Meropenem),   · CT head without contrast, with no acute intracranial abnormality. · CT chest with contrast (02/19/2021): Impression: Right pleural effusion with compressive atelectasis of the right middle and lower lobes. . Right upper lobe with a pleural-based 1.7 x 2.0 cm opacity, which could represent round atelectasis, scarring or nodule. Recommend follow-up CT chest in 3 months per Fleischner criteria. Left subclavian vein with a short segment severe stenosis, just lateral to the stent. Left chest wall collaterals suggest chronicity. . Borderline heart size with scattered coronary artery calcifications. . Wedge-shaped low-density in the spleen concerning for age-indeterminate splenic infarct. Low-density of the liver which could be seen with fatty infiltration or exaggerated due to phase of contrast. Small volume ascites. · CT abdomen pelvis with IV contrast (02/19/2021): Impression: Ascites and mesenteric edema Limited evaluation of the peripancreatic fat. Normal enhancement of the pancreas. Wedge-shaped hypodensity in the spleen concerning for age-indeterminate infarct. Splenic vein and artery appear patent andmnormal caliber. Enlarged low-density liver concerning for fatty liver. Right pleural effusion, ascites, body wall edema suggests fluid overload. Heavily calcified atherosclerosis. Subacute to chronic appearing pubic rami fractures. · Abdominal complete (02/21/2021): Impression: Liver does not appear fatty by ultrasound. Low density appearance on prior CT may have been related to phase of contrast. Cholecystectomy. Common bile duct measures up to 1 cm, similar compared to 2/19/2021. This can be seen as reservoir effect after cholecystectomy. Bilateral renal atrophy. Right pleural effusion.  Small volume ascites · Followed by GI  recommendations much appreciated  · GI signed off,  · Okay for discharge from GI standpoint. · Follow-up with PCP and GI outpatient. Acute metabolic/toxic encephalopathy  Intermittent speech difficulties  · MRI brain without contrast (02/26/2021): Severely limited exam without evidence of acute ischemia. Sequela of chronic microvascular ischemia. Abnormal T2 signal at C1-C2 on the left of uncertain significance. This is poorly evaluated by MRI. · Seen by Neurology     · CT cervical spine WO Con (02/27/2020): Impression: No evidence of acute osseous injury in the cervical spine  · Okay for discharge from neurology standpoint.        Subacute to chronic pubic rami fractures.   · Continue supportive symptomatic management including pain management as needed  · Seen by Orthopedic surgeryappreciate recommendations  · Continued PT OT  · Cleared for discharge from orthopedic standpoint.       History of ESRD, on scheduled HD Tues_Thur_Sat  · Hyperkalemia, resolved  · Initial BUN/creatinine of 77/7.2  · Reportedly missed 1 session of HD (02/18/2021), prior to presentation  · Followed by Nephrology - appreciate recommendations  · Continued scheduled HD  · Continued management as per nephrology rec        History of insulin-dependent Diabetes Mellitus II  · Hypoglycemic on presentation  · Hemoglobin A1C: 6.7% (02/19/2021)  · Inpatient Regimens to included;  ? -Held basal Insulin Glargine (Lantus), for now, given concern for hypoglycemia  ? - Insulin Lispro (Humalog) on a Low dose sliding scale  · Monitored POC glucose, and adjusted insulin regimen accordingly based on daily insulin requirement.        Continued management of other chronic medical conditions      Physical Exam:  Vital Signs: BP (!) 159/69 Comment: DIALYSIS   Pulse 85 Comment: DIALYSIS   Temp 97.3 °F (36.3 °C) (Temporal)   Resp 20   Ht 4' 11\" (1.499 m)   Wt 136 lb 11.2 oz (62 kg)   SpO2 100%   BMI 27.61 kg/m²   Physical Exam Vitals signs and nursing note reviewed. Constitutional:       General: She is not in acute distress. Appearance: Normal appearance. She is not ill-appearing, toxic-appearing or diaphoretic. HENT:      Head: Normocephalic and atraumatic. Right Ear: External ear normal.      Left Ear: External ear normal.      Nose: Nose normal. No congestion or rhinorrhea. Mouth/Throat:      Mouth: Mucous membranes are moist.      Pharynx: Oropharynx is clear. No oropharyngeal exudate or posterior oropharyngeal erythema. Eyes:      General: No scleral icterus. Right eye: No discharge. Left eye: No discharge. Extraocular Movements: Extraocular movements intact. Conjunctiva/sclera: Conjunctivae normal.      Pupils: Pupils are equal, round, and reactive to light. Neck:      Musculoskeletal: Normal range of motion and neck supple. No neck rigidity or muscular tenderness. Vascular: No carotid bruit. Cardiovascular:      Rate and Rhythm: Normal rate and regular rhythm. Pulses: Normal pulses. Heart sounds: Normal heart sounds. No murmur. No friction rub. No gallop. Pulmonary:      Effort: Pulmonary effort is normal. No respiratory distress. Breath sounds: Normal breath sounds. No stridor. No wheezing, rhonchi or rales. Chest:      Chest wall: No tenderness. Abdominal:      General: Bowel sounds are normal. There is no distension. Palpations: Abdomen is soft. Tenderness: There is no abdominal tenderness. There is no guarding or rebound. Musculoskeletal: Normal range of motion. General: No swelling, tenderness, deformity or signs of injury. Right lower leg: No edema. Left lower leg: No edema. Skin:     General: Skin is warm and dry. Capillary Refill: Capillary refill takes less than 2 seconds. Coloration: Skin is not jaundiced or pale. Findings: No bruising, erythema, lesion or rash.    Neurological: General: No focal deficit present. Mental Status: She is alert and oriented to person, place, and time. Cranial Nerves: No cranial nerve deficit. Sensory: No sensory deficit. Motor: No weakness. Coordination: Coordination normal.   Psychiatric:         Mood and Affect: Mood normal.         Behavior: Behavior normal.         Thought Content: Thought content normal.         Discharge Medications:       Jonathan Arm   Home Medication Instructions ZIF:[id number]    Printed on:02/27/21 [id number]   Medication Information                      albuterol sulfate HFA (PROVENTIL HFA) 108 (90 Base) MCG/ACT inhaler  Inhale 2 puffs into the lungs every 6 hours as needed for Wheezing             aspirin EC 81 MG EC tablet  Take 81 mg by mouth daily             B-D ULTRAFINE III SHORT PEN 31G X 8 MM MISC  USE ONCE DAILY TO INJECT INSULIN DAILY             blood glucose monitor kit and supplies  1 kit by Other route 3 times daily Test three times a day & as needed for symptoms of irregular blood glucose. Blood Glucose Monitoring Suppl NIKOS  by Does not apply route. Pt will also need new lancet device if not included in kit. brimonidine (ALPHAGAN P) 0.15 % ophthalmic solution  Place 1 drop into the left eye 3 times daily              carvedilol (COREG) 12.5 MG tablet  Take 12.5 mg by mouth 2 times daily (with meals)             Cholecalciferol (VITAMIN D3) 1000 units CAPS  Take by mouth             dicyclomine (BENTYL) 10 MG capsule  Take 1 capsule by mouth 4 times daily             dorzolamide (TRUSOPT) 2 % ophthalmic solution  Place 1 drop into the left eye 2 times daily              famotidine (PEPCID) 20 MG tablet  TAKE 1 TABLET BY MOUTH TWICE DAILY             fluticasone (FLONASE) 50 MCG/ACT nasal spray  2 sprays by Nasal route daily.  To keep ears opened up             Glucose Blood (BLOOD GLUCOSE TEST STRIPS) STRP Check blood sugar twice a day for recent medication adjustments. homatropine 5 % ophthalmic solution  Place 1 drop into the left eye daily              insulin glargine (LANTUS SOLOSTAR) 100 UNIT/ML injection pen  INJECT 10 UNITS INTO THE SKIN ONCE DAILY             Insulin Syringe-Needle U-100 (ACCUSURE INS SYR 1CC/31GX5/16\") 31G X 5/16\" 1 ML MISC  by Does not apply route. ipratropium-albuterol (DUONEB) 0.5-2.5 (3) MG/3ML SOLN nebulizer solution  Inhale 3 mLs into the lungs every 4 hours             lactulose (CHRONULAC) 10 GM/15ML solution  TAKE 30 ML BY MOUTH THREE TIMES DAILY             losartan (COZAAR) 50 MG tablet  TAKE 1 TABLET BY MOUTH TWICE DAILY             midodrine (PROAMATINE) 5 MG tablet  TK 1 T PO HALF WAY THROUGH TREATMENT AS DIRECTED             ondansetron (ZOFRAN) 4 MG tablet  Take 4 mg by mouth every 6 hours             pravastatin (PRAVACHOL) 10 MG tablet  TAKE 1 TABLET BY MOUTH EVERY NIGHT AT BEDTIME             pregabalin (LYRICA) 50 MG capsule  Take 1 capsule by mouth 2 times daily for 30 days. sevelamer (RENVELA) 800 MG tablet  Take 1 tablet by mouth daily              timolol (TIMOPTIC-XR) 0.5 % ophthalmic gel-forming  Place 1 drop into the left eye 2 times daily              Travoprost, ALLISON Free, (TRAVATAN Z) 0.004 % SOLN ophthalmic solution  1 drop nightly                 Discharge Instructions: Follow up with RAMONA Torres in 7 days. Take medications as directed. Resume activity as tolerated. Diet: DIET CARB CONTROL;  Renal        DISCHARGE STATUS:    Condition: Good  Disposition: Patient is medically stable and will be discharged home    Extended Emergency Contact Information  Primary Emergency Contact: Kyler Cleaning of [address] [place] [address] Phone: [phone]  Mobile Phone: [phone]  Relation: Other Time Spent on discharge is more than 30 minutes in the examination, evaluation, counseling and review of medications and discharge plan. Electronically signed by   Travon Cali MD, MPH,   Internal Medicine Hospitalist   2/27/2021 2:58 PM      Thank you RAMONA Ferraro for the opportunity to be involved in this patient's care. If you have any questions or concerns please feel free to contact me at [phone]        EMR Dragon/Transcription disclaimer:   Much of this encounter note is an electronic transcription/translation of spoken language to printed text.  The electronic translation of spoken language may permit erroneous, or at times, nonsensical words or phrases to be inadvertently transcribed; although attempts have made to review the note for such errors, some may still exist.

## 2021-03-01 ENCOUNTER — CARE COORDINATION (OUTPATIENT)
Dept: CASE MANAGEMENT | Age: 61
End: 2021-03-01

## 2021-03-01 ENCOUNTER — TELEPHONE (OUTPATIENT)
Dept: INTERNAL MEDICINE CLINIC | Age: 61
End: 2021-03-01

## 2021-03-01 ENCOUNTER — TELEPHONE (OUTPATIENT)
Dept: PRIMARY CARE CLINIC | Age: 61
End: 2021-03-01

## 2021-03-01 DIAGNOSIS — R41.82 ALTERED MENTAL STATUS, UNSPECIFIED ALTERED MENTAL STATUS TYPE: Primary | ICD-10-CM

## 2021-03-01 DIAGNOSIS — J84.9 INTERSTITIAL LUNG DISEASE (HCC): ICD-10-CM

## 2021-03-01 LAB — HBA1C MFR BLD: 6.4 % (ref 4–6)

## 2021-03-01 PROCEDURE — 1111F DSCHRG MED/CURRENT MED MERGE: CPT | Performed by: NURSE PRACTITIONER

## 2021-03-01 RX ORDER — IPRATROPIUM BROMIDE AND ALBUTEROL SULFATE 2.5; .5 MG/3ML; MG/3ML
1 SOLUTION RESPIRATORY (INHALATION) EVERY 4 HOURS
Qty: 360 ML | Refills: 1 | Status: SHIPPED | OUTPATIENT
Start: 2021-03-01

## 2021-03-01 RX ORDER — ALBUTEROL SULFATE 90 UG/1
2 AEROSOL, METERED RESPIRATORY (INHALATION) EVERY 6 HOURS PRN
Qty: 1 INHALER | Refills: 3 | Status: SHIPPED | OUTPATIENT
Start: 2021-03-01

## 2021-03-01 NOTE — TELEPHONE ENCOUNTER
[facility] Transitions Initial Follow Up Call    Outreach made within 2 business days of discharge: Yes    Patient: Gauri Nava Patient : 1960   MRN: [id number]    Reason for Admission: Bilateral pubic rami fractures, closed, initial encounter     Type 2 diabetes mellitus with ophthalmic complication, with long-term current use of insulin, AMS  Discharge Date: 21       Spoke with: LOUIS Zamora Discharge department/facility:     [facility] Interactive Patient Contact:  Was patient able to fill all prescriptions: Yes  Was patient instructed to bring all medications to the follow-up visit: Yes  Is patient taking all medications as directed in the discharge summary? Yes  Does patient understand their discharge instructions: Yes  Does patient have questions or concerns that need addressed prior to 7-14 day follow up office visit: yes - Needs 2 Refills    Scheduled appointment with PCP within 7-14 days    Spoke with Stacia'george Harrison. He states she is doing much better since being discharged from [address] [place] [address]. She was n for 9 days. He appetite is good, he is preparing regular meals for her. No signs of fever or infection. Has not had a bowel movement at home yet. No bladder issues. Overall it sounds like he is taking very good care of her. Telemed appointment set up for 3/5/21.   Follow Up  Future Appointments   Date Time Provider Darrell Price   2021 11:15 AM RAMONA [place] [address] [id number] [facility], [facility]

## 2021-03-01 NOTE — TELEPHONE ENCOUNTER
[address] [address] has referral from Sam Fleming follow pt for [facility] ?

## 2021-03-01 NOTE — TELEPHONE ENCOUNTER
Dwayne Cliff called to request a refill on her medication.       Last office visit : 2/24/2021   Next office visit : 3/5/2021     Requested Prescriptions     Pending Prescriptions Disp Refills    ipratropium-albuterol (DUONEB) 0.5-2.5 (3) MG/3ML SOLN nebulizer solution 360 mL 1     Sig: Inhale 3 mLs into the lungs every 4 hours    albuterol sulfate HFA (PROVENTIL HFA) 108 (90 Base) MCG/ACT inhaler 1 Inhaler 3     Sig: Inhale 2 puffs into the lungs every 6 hours as needed for Piazzetta Scalemax[address], [place]

## 2021-03-01 NOTE — CARE COORDINATION
[facility] Transitions Initial Follow Up Call    Call within 2 business days of discharge: Yes    Patient: Kieran Null Patient : 1960   MRN: [id number]  Reason for Admission: AMS, et al  Discharge Date: 21 RARS: Readmission Risk Score: 36      Last Discharge [facility]       Complaint Diagnosis Description Type Department Provider    21 Diarrhea; Failure To Thrive ESRD on hemodialysis ([facility] [address] . .. ED to Hosp-Admission (Discharged) (ADMITTED) [facility] Norma Armas MD; [address] .. Spoke with: Akua Turcios, significant other    Facility: [address] [place] [address]    Non-face-to-face services provided:  Reviewed encounter information for continuity of care prior to follow up Care Transitions phone call - chart notes, consults, progress notes, test results, med list, appointments, AVS, other information. Care Transitions 24 Hour Call    Do you have any ongoing symptoms?: No  Do you have a copy of your discharge instructions?: Yes  Do you have all of your prescriptions and are they filled?: Yes  Have you been contacted by a [address]?: No  Have you scheduled your follow up appointment?: No  Were you discharged with any Home Care or Post Acute Services: No  Do you feel like you have everything you need to keep you well at home?: Yes  Care Transitions Interventions         Follow Up  Future Appointments   Date Time Provider Darrell Price   2021 11:15 AM Suraj Hines, APRN [address] a call to the number listed for patient and spoke with her significant other, Damion Munguia, for an initial follow up call post discharge. He reported that since getting home, she has been doing well, better yesterday than Saturday night. Better today than yesterday. He said she is still quite weak and is using the walker to get around, but is more alert and at herself. He said that her bowels are back to normal and she is no longer having diarrhea.   He said he told her readmission: functional cognitive ability, functional physical ability, ineffective coping, level of motivation, medical condition and medication management    Care Transition Nurse (CTN) contacted the caregiver by telephone to perform post hospital discharge assessment. Verified name and  with caregiver as identifiers. Provided introduction to self, and explanation of the CTN role. CTN reviewed discharge instructions, medical action plan and red flags with caregiver who verbalized understanding. Caregiver given an opportunity to ask questions and does not have any further questions or concerns at this time. Were discharge instructions available to patient? Yes. Reviewed appropriate site of care based on symptoms and resources available to patient including: PCP, Specialist, Urgent care clinics, Home health and When to call 911. The caregiver agrees to contact the PCP office for questions related to their healthcare. Medication reconciliation was performed with caregiver, who verbalizes understanding of administration of home medications. Advised obtaining a 90-day supply of all daily and as-needed medications. Covid Risk Education    Patient has following risk factors of: chronic kidney disease. Education provided regarding infection prevention, and signs and symptoms of COVID-19 and when to seek medical attention with caregiver who verbalized understanding. Discussed exposure protocols and quarantine From CDC: Are you at higher risk for severe illness?   and given an opportunity for questions and concerns. The caregiver agrees to contact the COVID-19 hotline [phone] or PCP office for questions related to COVID-19.      For more information on steps you can take to protect yourself, see CDC's How to Protect Yourself     Patient/family/caregiver given information for Avtar Montes and agrees to enroll no  Patient's preferred e-mail: declines  Patient's preferred phone number: declines    Discussed follow-up appointments. If no appointment was previously scheduled, appointment scheduling offered: Yes. Caregiver has called for an appointment this am already. Plan for follow-up call in 5-7 days based on severity of symptoms and risk factors. Plan for next call: - disease process mgmt, symptom mgmt, diet/hydration, pain control needs, medication mgmt, activity level, home safety needs, infection control, fall precautions, seeking medical attention, who/when to call prn any needs, etc.    CTN provided contact information for future needs.         Johnny Shay RN

## 2021-03-03 ENCOUNTER — TELEPHONE (OUTPATIENT)
Dept: INTERNAL MEDICINE CLINIC | Age: 61
End: 2021-03-03

## 2021-03-03 NOTE — TELEPHONE ENCOUNTER
Domingo Isaacs worker made visit today and no needs were identified for SW to follow at this time   Pt is assisted by her son and her boyfriends grand daughter     Also OT eval was done and recommending Skilled OT for 2w1, 1w1 beginning 3/7/21. For assistance with bathing and significantly reduced activity tolerance.      Please advise if OT visits approved

## 2021-03-04 ENCOUNTER — CARE COORDINATION (OUTPATIENT)
Dept: CASE MANAGEMENT | Age: 61
End: 2021-03-04

## 2021-03-04 NOTE — CARE COORDINATION
[facility] Transitions Follow Up Call    3/4/2021    Patient: Elayne Dowling  Patient : 1960   MRN: [id number]    Discharge Date: 21 RARS: Readmission Risk Score: 39         Spoke with: Jennifer Barbosa, significant other    Care Transitions Subsequent and Final Call    Subsequent and Final Calls  Do you have any ongoing symptoms?: No  Have your medications changed?: No  Do you have any questions related to your medications?: No  Do you currently have any active services?: No  Do you have any needs or concerns that I can assist you with?: No  Identified Barriers: None  Care Transitions Interventions  Other Interventions: Follow Up  Future Appointments   Date Time Provider Darrell Price   3/5/2021  7[facility]   2021 11:15 [facility]     Placed a call to the number listed for patient and spoke with her significant other, Jennifer Barbosa. He reported that she is doing very well. He said that she is eating and drinking better. He said she is doing her own personal caree, bathing, dressing, etc.  He said she is tolerating dialysis better. She is there now. He is not aware of any abnormal issues or problems. Did discuss COVID 23 Risk information. He voiced understanding. He said she has all of her medications and is taking it as ordered. TO call prn any needs. CTN to follow up as indicated.         Heriberto Means RN

## 2021-03-11 ENCOUNTER — CARE COORDINATION (OUTPATIENT)
Dept: CASE MANAGEMENT | Age: 61
End: 2021-03-11

## 2021-03-11 NOTE — CARE COORDINATION
[facility] Transitions Follow Up Call    3/11/2021    Patient: Jan Ramírezland  Patient : 1960   MRN: [id number]  Discharge Date: 21 RARS: Readmission Risk Score: 39         Spoke with: N/A    Care Transitions Subsequent and Final Call    Subsequent and Final Calls  Care Transitions Interventions  Other Interventions: Follow Up  Future Appointments   Date Time Provider Darrell Price   2021 11:15 AM RAMONA [facility]ORA[facility]   Attempted to make contact with patient/caregiver for a routine follow up call without success. Unable to leave a message regarding intent of call and call back information. Call went to an unidentifiable voice mail. Will resolve episode as patient has been stable and greater than 14 days past discharged.          Anastacio Silva RN

## 2021-03-19 DIAGNOSIS — H69.90 EUSTACHIAN TUBE DISORDER: ICD-10-CM

## 2021-03-19 DIAGNOSIS — H91.90 HEARING DIFFICULTY: ICD-10-CM

## 2021-03-19 DIAGNOSIS — M79.605 LEG PAIN, BILATERAL: ICD-10-CM

## 2021-03-19 DIAGNOSIS — R42 DIZZINESS: ICD-10-CM

## 2021-03-19 DIAGNOSIS — M79.604 LEG PAIN, BILATERAL: ICD-10-CM

## 2021-03-19 DIAGNOSIS — H65.90 MIDDLE EAR EFFUSION: ICD-10-CM

## 2021-03-19 NOTE — TELEPHONE ENCOUNTER
Charli Garrison called to request a refill on her medication. Last office visit : 3/5/2021   Next office visit : 3/31/2021     Last UDS:   Amphetamine Screen, Urine   Date Value Ref Range Status   09/01/2020 Negative Negative <1000 ng/mL Final     Benzodiazepine Screen, Urine   Date Value Ref Range Status   09/01/2020 Negative Negative <100 ng/mL Final     Cocaine Metabolite Screen, Urine   Date Value Ref Range Status   09/01/2020 Negative Negative <300 ng/mL Final     Opiate Scrn, Ur   Date Value Ref Range Status   09/01/2020 Negative Negative < 300 ng/mL Final       Last Richard Conor: 3/19/2021  Medication Contract: Not on file   Last Fill: 2/12/2021    Requested Prescriptions     Pending Prescriptions Disp Refills    pregabalin (LYRICA) 50 MG capsule [Pharmacy Med Name: PREGABALIN 50MG CAPSULES] 60 capsule      Sig: TAKE 1 CAPSULE BY MOUTH TWICE DAILY    fluticasone (FLONASE) 50 MCG/ACT nasal spray [Pharmacy Med Name: FLUTICASONE 50MCG NASAL SP (120) RX] 16 g      Sig: SHAKE LIQUID AND USE 2 SPRAYS IN EACH NOSTRIL TWICE DAILY         Please approve or refuse this medication.    Kami Manuel

## 2021-03-22 ENCOUNTER — TELEPHONE (OUTPATIENT)
Dept: INTERNAL MEDICINE CLINIC | Age: 61
End: 2021-03-22

## 2021-03-23 RX ORDER — FLUTICASONE PROPIONATE 50 MCG
SPRAY, SUSPENSION (ML) NASAL
Qty: 16 G | Refills: 1 | Status: SHIPPED | OUTPATIENT
Start: 2021-03-23

## 2021-03-23 RX ORDER — PREGABALIN 50 MG/1
CAPSULE ORAL
Qty: 60 CAPSULE | Refills: 0 | Status: SHIPPED | OUTPATIENT
Start: 2021-03-23 | End: 2021-04-27 | Stop reason: SDUPTHER

## 2021-03-27 PROBLEM — S42.201A CLOSED FRACTURE OF RIGHT PROXIMAL HUMERUS: Status: ACTIVE | Noted: 2021-01-01

## 2021-03-27 PROBLEM — E87.1 HYPONATREMIA: Status: ACTIVE | Noted: 2021-01-01

## 2021-03-27 PROBLEM — S01.81XA FOREHEAD LACERATION: Status: ACTIVE | Noted: 2021-01-01

## 2021-03-27 PROBLEM — E03.9 HYPOTHYROIDISM (ACQUIRED): Status: ACTIVE | Noted: 2021-01-01

## 2021-03-29 ENCOUNTER — TELEPHONE (OUTPATIENT)
Dept: PRIMARY CARE CLINIC | Age: 61
End: 2021-03-29

## 2021-03-29 NOTE — TELEPHONE ENCOUNTER
[facility] Transitions Initial Follow Up Call    Outreach made within 2 business days of discharge: Yes    Patient: Lashawn Padilla   Patient : 1960   MRN: [id number]    Reason for Admission: Discharge Date: 3/28/21       Spoke with: LOUIS Morse Discharge department/facility: [facility] Interactive Patient Contact:  Was patient able to fill all prescriptions: Yes  Was patient instructed to bring all medications to the follow-up visit: Yes  Is patient taking all medications as directed in the discharge summary? Yes  Does patient understand their discharge instructions: Yes  Does patient have questions or concerns that need addressed prior to 7-14 day follow up office visit: no    I spoke with Alina Salcido. He is at [facility] for a health concern. He states that Inocencio Miller is doing well. His daughter came home to take care of her. He gave me a phone number that did not have a voicemail available.      Scheduled appointment with PCP within 7-14 days    Follow Up  Future Appointments   Date Time Provider Darrell Price   3/31/2021  1:45 PM RAMONA [facility]   2021  2:00 PM MD ZEHRA Fischer [facility]   2021 11:15 AM RAMONA Ferraro [address]

## 2021-03-29 NOTE — OUTREACH NOTE
Prep Survey      Responses   [facility] facility patient discharged from?  [place]   Is LACE score < 7 ?  No   Emergency Room discharge w/ pulse ox?  No   Eligibility  Readm Mgmt   Discharge diagnosis  Closed fracture of right proximal humerus   Does the patient have one of the following disease processes/diagnoses(primary or secondary)?  Other   Does the patient have Home health ordered?  Yes   What is the Home health agency?   [facility]   Is there a DME ordered?  No   Prep survey completed?  Yes          Kami Jones RN

## 2021-03-31 NOTE — OUTREACH NOTE
Medical Week 1 Survey      Responses   [facility] patient discharged from?  [place]   Does the patient have one of the following disease processes/diagnoses(primary or secondary)?  Other   Week 1 attempt successful?  No   Unsuccessful attempts  Attempt 1          Delaney Lester RN

## 2021-04-01 NOTE — ED PROVIDER NOTES
Subjective   History of Present Illness    Patient is a 61-year-old female chief complaint of persistent right arm pain.  The patient was seen in this ED earlier today at approximately 9 AM.  Her pain was controlled with some pain medication and then she went to dialysis.  While there, the patient complained of severe pain and ultimately she came back to the ER to get further pain medication.  She did not fill the pain medicine as already prescribed by the ER earlier today.  She denies any other injury.  She complains of continued pain.  She denies any loss of sensation.    Review of Systems   All other systems reviewed and are negative.      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    • Chronic otitis media    • Diabetes (CMS/[facility])    • End stage renal disease on dialysis (CMS/[facility])    • Eustachian tube dysfunction    • Glaucoma    • HTN (hypertension)    • Mucoid otitis media    • Noncompliance of patient with renal dialysis (CMS/[facility])    • Tobacco abuse        Allergies   Allergen Reactions   • Bupropion Nausea Only   • Chantix [Varenicline]    • Sulfa Antibiotics    • Azithromycin Rash   • Levofloxacin Rash   • Penicillins Rash       Past Surgical History:   Procedure Laterality Date   • ARTERIOVENOUS FISTULA     • CATARACT EXTRACTION WITH INTRAOCULAR LENS IMPLANT     •  SECTION     • CHOLECYSTECTOMY     • MYRINGOTOMY W/ TUBES Bilateral    • MYRINGOTOMY W/ TUBES Right    • TUBAL ABDOMINAL LIGATION         Family History   Problem Relation Age of Onset   • Heart disease Mother    • Diabetes Father        Social History     Socioeconomic History   • Marital status: Single     Spouse name: Not on file   • Number of children: Not on file   • Years of education: Not on file   • Highest education level: Not on file   Tobacco Use   • Smoking status: Current Every Day Smoker     Packs/day: 2.00     Years: 6.00     Pack years: 12.00     Types: Cigarettes   •  Smokeless tobacco: Never Used   Substance and Sexual Activity   • Alcohol use: No   • Drug use: No   • Sexual activity: Defer       Prior to Admission medications    Medication Sig Start Date End Date Taking? Authorizing Provider   albuterol sulfate  (90 Base) MCG/ACT inhaler Inhale 2 puffs Every 4 (Four) Hours As Needed for Wheezing. 1/21/20   Sundeep Aldridge MD   aspirin 81 MG EC tablet Take 81 mg by mouth Daily.    Steve Warren MD   brimonidine (ALPHAGAN) 0.2 % ophthalmic solution Administer 1 drop into the left eye 3 (Three) Times a Day.    Steve Warren MD   carvedilol (COREG) 6.25 MG tablet Take 6.25 mg by mouth 2 (Two) Times a Day With Meals.    Steve Warren MD   cholecalciferol (VITAMIN D3) 1000 units tablet Take 2,000 Units by mouth Daily.    Steve Warren MD   dorzolamide (TRUSOPT) 2 % ophthalmic solution Administer 1 drop into the left eye 2 (Two) Times a Day.    Steve Warren MD   famotidine (PEPCID) 20 MG tablet Take 1 tablet by mouth 2 (Two) Times a Day. 4/29/20   Lyndon Lockett MD   fluticasone (FLONASE) 50 MCG/ACT nasal spray 2 sprays into the nostril(s) as directed by provider 2 (Two) Times a Day.    Steve Warren MD   insulin glargine (LANTUS) 100 UNIT/ML injection Inject 10 Units under the skin Every Night.    Steve Warren MD   lactobacillus acidophilus (RISAQUAD) capsule capsule Take 1 capsule by mouth Daily. 5/3/20   Lyndon Lockett MD   Latanoprostene Bunod (Vyzulta) 0.024 % solution Administer 1 drop into the left eye Every Night. Left eye     Steve Warren MD   levothyroxine (SYNTHROID, LEVOTHROID) 25 MCG tablet Take 1 tablet by mouth Daily. 3/28/21   Roxie Schneider APRN   losartan (COZAAR) 50 MG tablet Take 1 tablet by mouth Daily. 3/29/21   Roxie Schneider APRN   Netarsudil Dimesylate (RHOPRESSA) 0.02 % solution Administer 1 drop into the left eye Every Night.    Steve Warren MD   nicotine  "(NICODERM CQ) 21 MG/24HR patch Place 1 patch on the skin as directed by provider Daily. 4/30/20   Lyndon Lockett MD   ondansetron (ZOFRAN) 4 MG tablet Take 1 tablet by mouth Every 6 (Six) Hours As Needed for Nausea or Vomiting. 4/29/20   Lyndon Lockett MD   pravastatin (PRAVACHOL) 10 MG tablet Take 10 mg by mouth Every Night.    Steve Warren MD   pregabalin (LYRICA) 50 MG capsule Take 50 mg by mouth 2 (Two) Times a Day.    Steve Warren MD   sertraline (ZOLOFT) 25 MG tablet Take 25 mg by mouth Daily.    Steve Warren MD   sevelamer (RENAGEL) 800 MG tablet Take 2,400 mg by mouth 3 (Three) Times a Day With Meals.    Steve Warren MD   timolol (TIMOPTIC) 0.5 % ophthalmic solution Administer 1 drop into the left eye Every Morning.    Steve Warren MD   traMADol (ULTRAM) 50 MG tablet Take 1 tablet by mouth Every 8 (Eight) Hours As Needed for Moderate Pain . 4/1/21   Jensen Michele MD   traMADol (ULTRAM) 50 MG tablet Take 0.5 tablets by mouth Every 6 (Six) Hours As Needed for Moderate Pain  for up to 6 days. 3/28/21 4/1/21  Roxie Schneider, APRN       Medications   HYDROmorphone (DILAUDID) injection 1 mg (1 mg Intramuscular Given 4/1/21 1739)   ondansetron ODT (ZOFRAN-ODT) disintegrating tablet 4 mg (4 mg Oral Given 4/1/21 1739)       /90 (BP Location: Left arm, Patient Position: Sitting)   Pulse 85   Temp 97.8 °F (36.6 °C) (Oral)   Resp 16   Ht 149.9 cm (59\")   Wt 61.2 kg (135 lb)   SpO2 94%   BMI 27.27 kg/m²       Objective   Physical Exam  Vitals reviewed.   Constitutional:       Appearance: Normal appearance.   HENT:      Mouth/Throat:      Mouth: Mucous membranes are moist.   Eyes:      Extraocular Movements: Extraocular movements intact.   Cardiovascular:      Rate and Rhythm: Normal rate and regular rhythm.      Pulses: Normal pulses.      Heart sounds: Normal heart sounds.   Pulmonary:      Effort: Pulmonary effort is normal.      Breath sounds: " Normal breath sounds.   Abdominal:      Palpations: Abdomen is soft.   Musculoskeletal:         General: Tenderness and signs of injury present.      Cervical back: Normal range of motion and neck supple.      Comments: Pulses palpable bilaterally.  Normal sensation.  Limited movement due to the pain.  Please see previous HPI documented today.   Neurological:      Mental Status: She is alert.         Procedures         Lab Results (last 24 hours)     ** No results found for the last 24 hours. **          ED Course  ED Course as of Apr 01 1806   Thu Apr 01, 2021   1659 Patient was seen here earlier for the same complaint.  She had pain improvement with her pain with Dilaudid.  We did prescribe Percocet and replacement for the Ultram see that would help for her to take at home.  She understands that this is a short course of pain medication and I recommend the patient follow-up with her orthopedic surgeon for further assessment and medication refill if necessary.  Strict return precaution advised.  Patient be discharged in care of her boyfriend.    [TK]      ED Course User Index  [TK] Keisha Major PA          [facility]    Final diagnoses:   Fracture of humeral head, right, closed, with routine healing, subsequent encounter          Keisha Major PA  04/01/21 1807

## 2021-04-01 NOTE — ED PROVIDER NOTES
Subjective   History of Present Illness    Patient is a pleasant 61-year-old female chief complaint of continued right arm pain after a fall.  The patient describes that she had fallen on the 26th (6 days ago).  She tripped over her house shoes and landed on her dominant right arm.  She endured a right proximal humeral fracture and was also admitted for other reasons as well.  Orthopedic surgeon did evaluate her; she was placed in a sling. Recommendations are to treat nonoperatively and repeat xrays in 2 -3 weeks.  Her next appointment is April 12, 2021.  The patient was prescribed Ultram which did control her pain.  She ran out of medications last evening.  With the pain worsening since she ran out of medicines this morning, EMS was notified and patient was transferred here for pain control.  The patient denies any other injury.  She denies any loss of sensation.  She does complain limited movement due to the pain.    Review of Systems   Constitutional: Positive for activity change.   HENT: Negative.    Eyes: Negative.    Respiratory: Negative.    Cardiovascular: Negative.    Gastrointestinal: Negative.    Genitourinary: Negative.    Musculoskeletal: Negative.    Skin: Negative.    Neurological: Negative.    All other systems reviewed and are negative.      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    • Chronic otitis media    • Diabetes (CMS/[facility])    • End stage renal disease on dialysis (CMS/[facility])    • Eustachian tube dysfunction    • Glaucoma    • HTN (hypertension)    • Mucoid otitis media    • Noncompliance of patient with renal dialysis (CMS/[facility])    • Tobacco abuse        Allergies   Allergen Reactions   • Bupropion Nausea Only   • Chantix [Varenicline]    • Sulfa Antibiotics    • Azithromycin Rash   • Levofloxacin Rash   • Penicillins Rash       Past Surgical History:   Procedure Laterality Date   • ARTERIOVENOUS FISTULA     • CATARACT EXTRACTION WITH  INTRAOCULAR LENS IMPLANT     •  SECTION     • CHOLECYSTECTOMY     • MYRINGOTOMY W/ TUBES Bilateral    • MYRINGOTOMY W/ TUBES Right    • TUBAL ABDOMINAL LIGATION         Family History   Problem Relation Age of Onset   • Heart disease Mother    • Diabetes Father        Social History     Socioeconomic History   • Marital status: Single     Spouse name: Not on file   • Number of children: Not on file   • Years of education: Not on file   • Highest education level: Not on file   Tobacco Use   • Smoking status: Current Every Day Smoker     Packs/day: 2.00     Years: 6.00     Pack years: 12.00     Types: Cigarettes   • Smokeless tobacco: Never Used   Substance and Sexual Activity   • Alcohol use: No   • Drug use: No   • Sexual activity: Defer       Prior to Admission medications    Medication Sig Start Date End Date Taking? Authorizing Provider   albuterol sulfate  (90 Base) MCG/ACT inhaler Inhale 2 puffs Every 4 (Four) Hours As Needed for Wheezing. 20   Sundeep Aldridge MD   aspirin 81 MG EC tablet Take 81 mg by mouth Daily.    ProviderSteve MD   brimonidine (ALPHAGAN) 0.2 % ophthalmic solution Administer 1 drop into the left eye 3 (Three) Times a Day.    Steve Warren MD   carvedilol (COREG) 6.25 MG tablet Take 6.25 mg by mouth 2 (Two) Times a Day With Meals.    Steve Warren MD   cholecalciferol (VITAMIN D3) 1000 units tablet Take 2,000 Units by mouth Daily.    Steve Warren MD   dorzolamide (TRUSOPT) 2 % ophthalmic solution Administer 1 drop into the left eye 2 (Two) Times a Day.    Steve Warren MD   famotidine (PEPCID) 20 MG tablet Take 1 tablet by mouth 2 (Two) Times a Day. 20   Lyndon Lockett MD   fluticasone (FLONASE) 50 MCG/ACT nasal spray 2 sprays into the nostril(s) as directed by provider 2 (Two) Times a Day.    Steve Warren MD   insulin glargine (LANTUS) 100 UNIT/ML injection Inject 10 Units under the skin Every Night.     "Steve Warren MD   lactobacillus acidophilus (RISAQUAD) capsule capsule Take 1 capsule by mouth Daily. 5/3/20   Lyndon Lockett MD   Latanoprostene Bunod (Vyzulta) 0.024 % solution Administer 1 drop into the left eye Every Night. Left eye     Steve Warren MD   levothyroxine (SYNTHROID, LEVOTHROID) 25 MCG tablet Take 1 tablet by mouth Daily. 3/28/21   Roxie Schneider APRN   losartan (COZAAR) 50 MG tablet Take 1 tablet by mouth Daily. 3/29/21   Roxie Schneider APRN   Netarsudil Dimesylate (RHOPRESSA) 0.02 % solution Administer 1 drop into the left eye Every Night.    Steve Warren MD   nicotine (NICODERM CQ) 21 MG/24HR patch Place 1 patch on the skin as directed by provider Daily. 4/30/20   Lyndon Lockett MD   ondansetron (ZOFRAN) 4 MG tablet Take 1 tablet by mouth Every 6 (Six) Hours As Needed for Nausea or Vomiting. 4/29/20   Lyndon Lockett MD   pravastatin (PRAVACHOL) 10 MG tablet Take 10 mg by mouth Every Night.    Steve Warren MD   pregabalin (LYRICA) 50 MG capsule Take 50 mg by mouth 2 (Two) Times a Day.    Steve Warren MD   sertraline (ZOLOFT) 25 MG tablet Take 25 mg by mouth Daily.    Steve Warren MD   sevelamer (RENAGEL) 800 MG tablet Take 2,400 mg by mouth 3 (Three) Times a Day With Meals.    Steve Warren MD   timolol (TIMOPTIC) 0.5 % ophthalmic solution Administer 1 drop into the left eye Every Morning.    Steve Warren MD   traMADol (ULTRAM) 50 MG tablet Take 0.5 tablets by mouth Every 6 (Six) Hours As Needed for Moderate Pain  for up to 6 days. 3/28/21 4/3/21  Roxie Schneider APRN       Medications   HYDROmorphone (DILAUDID) injection 1 mg (1 mg Intramuscular Given 4/1/21 1005)       BP (!) 89/75   Pulse 76   Temp 97.8 °F (36.6 °C)   Resp 20   Ht 149.9 cm (59\")   Wt 61.4 kg (135 lb 6.4 oz)   SpO2 95%   BMI 27.35 kg/m²       Objective   Physical Exam  Vitals reviewed.   Constitutional:       Appearance: Normal " appearance.   HENT:      Head:      Comments: Healing forehead laceration with 2 sutures in place.  No secondary signs of infection.     Nose: Nose normal.      Mouth/Throat:      Mouth: Mucous membranes are moist.   Eyes:      Extraocular Movements: Extraocular movements intact.   Cardiovascular:      Rate and Rhythm: Normal rate and regular rhythm.   Pulmonary:      Effort: Pulmonary effort is normal.      Breath sounds: Normal breath sounds.   Musculoskeletal:         General: Tenderness and signs of injury present.      Cervical back: Normal range of motion and neck supple.      Comments: Large area of ecchymosis to the right humerus with tenderness along the proximal region.  Nontender along the glenohumeral space, elbow, forearm, wrist, or hand.  Positive pulses bilaterally.  5 out of 5 strength bilaterally.  Able to move all her digits without difficulty.  Normal thumb abduction and opposition.  Normal sensation.  Pain is reproducible in the humeral region with any movement to her right upper extremity.   Neurological:      Mental Status: She is alert.         Procedures         Lab Results (last 24 hours)     ** No results found for the last 24 hours. **          XR Humerus Right   Final Result   Impression:       No significant change in appearance of RIGHT humeral surgical neck   fracture with extension into the humeral head greater and lesser   tuberosities.   This report was finalized on 04/01/2021 10:36 by Dr. Vasquez Villanueva MD.          ED Course  ED Course as of Apr 01 1118   Thu Apr 01, 2021   1047 Patient's repeat right humeral xray does not show significant change in appearance of right humeral surgical neck fracture. Patient did receive pain medication here to help alleviate the pain. She has been advised to followup with Dr. Springer since we can only write a short course of pain medication.     MARIANNA query complete. Treatment plan to include limited course of prescribed  controlled substance.  Risks including addiction, benefits, and alternatives presented to patient.      [TK]      ED Course User Index  [TK] Keisha Major PA          [facility]    Final diagnoses:   Humeral head fracture, right, with routine healing, subsequent encounter          Keisha Major PA  04/01/21 1118

## 2021-04-01 NOTE — TELEPHONE ENCOUNTER
"Caller was recently released from [facility] with right humerus fx.  She is out of pain med.  She has an ortho follow up on 4/12.  Can be heard moaning during call.  Recommend re evaluation for possible need for x ray either at the ortho institute or ER.    Reason for Disposition  • Health Information question, no triage required and triager able to answer question    Additional Information  • Negative: [1] Caller is not with the adult (patient) AND [2] reporting urgent symptoms  • Negative: Lab result questions  • Negative: Medication questions  • Negative: Caller can't be reached by phone  • Negative: Caller has already spoken to PCP or another triager  • Negative: RN needs further essential information from caller in order to complete triage  • Negative: Requesting regular office appointment  • Negative: [1] Caller requesting NON-URGENT health information AND [2] PCP's office is the best resource    Answer Assessment - Initial Assessment Questions  1. REASON FOR CALL or QUESTION: \"What is your reason for calling today?\" or \"How can I best help you?\" or \"What question do you have that I can help answer?\"      I am still in pain.    Protocols used: INFORMATION ONLY CALL - NO TRIAGE-ADULT-      "

## 2021-04-04 PROBLEM — R41.82 ALTERED MENTAL STATUS: Status: ACTIVE | Noted: 2021-01-01

## 2021-04-04 PROBLEM — R19.5 HEME POSITIVE STOOL: Status: ACTIVE | Noted: 2021-01-01

## 2021-04-04 NOTE — PROGRESS NOTES
Patient was admitted earlier this morning by Dr. Kent and his history and physical have been reviewed.  Patient admitted with altered mental status and confusion, and recently was hospitalized from 3/26-3/28 after sustaining a fall which resulted in a proximal right humeral fracture.  She was discharged home and evidently lives with her , who assists her in taking her to dialysis.  No family at bedside this morning.    Records indicate that she is on dialysis every [place], and did not go to dialysis yesterday for unclear reasons.  Nephrology has been consulted and appreciate them following along with us.    In the emergency department a fecal occult blood test was performed and was positive.  She has been started on a Protonix IV drip.  I have reviewed her hemoglobin trend dating back to February 2021 and is as follows:          Patient became acutely agitated overnight, and was given a dose of Geodon around 0300.  She is somnolent this morning and unable to provide much additional history at this time.    Her blood pressure trend has been reviewed.  I am going to hold her losartan and Coreg.    Her blood sugar trend also noted.  We will hold her insulin Levemir.  Cover with sliding scale insulin only at this time.  Given her current lethargy and somnolence, we will keep her n.p.o. at this time and allow the effects of Geodon to wear off, at which time hope to start a diet later today.    CT scan of the head has been obtained, and will follow up final results from radiology.    Continue to trend cardiac enzymes.  Continuous telemetry monitoring.    SCDs in place.    Recent x-ray of the right humerus on 4/1 as follows:        Right shoulder sling, and ice pack in place this morning.    CXR does reveal right pleural effusion:      Patient remains afebrile.  White blood cell count is normal.  Will also add a procalcitonin.    ICU care.  Work-up ongoing.    Electronically signed by Jaswant  LAURITA Villegas MD, 04/04/21, 07:31 CDT.

## 2021-04-04 NOTE — PLAN OF CARE
Per RN pt is not appropriate for a swallow assessment on this date. SLP will follow up on 4/5.

## 2021-04-04 NOTE — TELEPHONE ENCOUNTER
"States she fell and broke her arm a week ago Friday. She was seen yesterday as well. States she won't eat, she is moaning and hurting. States she won't take her medication. States he can't take care of her. States he thinks she needs to be seen again. States she is confused and will only groan or moan. Will not respond to fiance. States she hasn't had a pain pill since 1000 today. States she fell out of the chair last night. States she missed dialysis today as well.     Reason for Disposition  • [1] Difficult to awaken or acting confused (e.g., disoriented, slurred speech) AND [2] present now AND [3] new onset    Additional Information  • Negative: [1] Difficult to awaken or acting confused (e.g., disoriented, slurred speech) AND [2] present now AND [3] has diabetes (diabetes mellitus)  • Negative: [1] Weakness of the face, arm, or leg on one side of the body AND [2] new onset  • Negative: [1] Numbness of the face, arm, or leg on one side of the body AND [2] new onset  • Negative: [1] Loss of speech or garbled speech AND [2] new onset  • Negative: Difficulty breathing or bluish lips  • Negative: Shock suspected (e.g., cold/pale/clammy skin, too weak to stand, low BP, rapid pulse)  • Negative: Seeing, hearing, or feeling things that are not there (i.e., visual, auditory, or tactile hallucinations)  • Negative: Followed a head injury  • Negative: Drug overdose suspected  • Negative: Sounds like a life-threatening emergency to the triager  • Negative: Alcohol use, abuse or dependence: question or problem related to  • Negative: Drug abuse or dependence: question or problem related to  • Negative: Headache or vomiting  • Negative: Stiff neck (can't touch chin to chest)  • Negative: Very strange or paranoid behavior    Answer Assessment - Initial Assessment Questions  1. LEVEL OF CONSCIOUSNESS: \"How is he (she, the patient) acting right now?\" (e.g., alert-oriented, confused, lethargic, stuporous, comatose)      " "confused  2. ONSET: \"When did the confusion start?\"  (minutes, hours, days)      See note  3. PATTERN \"Does this come and go, or has it been constant since it started?\"  \"Is it present now?\"      constant  4. ALCOHOL or DRUGS: \"Has he been drinking alcohol or taking any drugs?\"       no  5. NARCOTIC MEDICATIONS: \"Has he been receiving any narcotic medications?\" (e.g., morphine, Vicodin)      percocet  6. CAUSE: \"What do you think is causing the confusion?\"       unknown  7. OTHER SYMPTOMS: \"Are there any other symptoms?\" (e.g., difficulty breathing, headache, fever, weakness)      no    Protocols used: CONFUSION - DELIRIUM-ADULT-AH      "

## 2021-04-04 NOTE — H&P
[facility] Medicine Services  HISTORY AND PHYSICAL    Date of Admission: 4/3/2021  Primary Care Physician: Bharati Dahl APRN    Subjective     Chief Complaint: Confusion    History of Present Illness  Patient is a 61-year-old white female past medical history of end-stage renal disease on  dialysis schedule.  She has a significant history of noncompliance with her dialysis.  Apparently she did not go today uncertain when she went last because of increased agitation and confusion.  She has done this in the past and become uremic and encephalopathic.  She was just hospitalized recently for missing dialysis after a fall and having a proximal humeral neck fracture.  She had subsequently been back in the ER due to noncontrolled pain and was prescribed Percocet 2 days prior to presentation now.  Her electrolytes are okay but her BUN and creatinine are up from baseline.  She is also worsening anemia rectal exam revealed positive stools.  Uncertain if she has had any melena or any other problems regarding this is yet unable to get any type of history out of the patient.  Also does moan and mumble and not communicating at all.  No family is present.        Review of Systems   Unable to obtain as patient is encephalopathic  Otherwise complete ROS reviewed and negative except as mentioned in the HPI.    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)    • Eustachian tube dysfunction    • Glaucoma    • HTN (hypertension)    • Mucoid otitis media    • Noncompliance of patient with renal dialysis (CMS/HCC)    • Tobacco abuse      Past Surgical History:  Past Surgical History:   Procedure Laterality Date   • ARTERIOVENOUS FISTULA     • CATARACT EXTRACTION WITH INTRAOCULAR LENS IMPLANT     •   SECTION     • CHOLECYSTECTOMY     • MYRINGOTOMY W/ TUBES Bilateral    • MYRINGOTOMY W/ TUBES Right    • TUBAL ABDOMINAL LIGATION       Social History:  reports that she has been smoking cigarettes. She has a 12.00 pack-year smoking history. She has never used smokeless tobacco. She reports that she does not drink alcohol and does not use drugs.    Family History: family history includes Diabetes in her father; Heart disease in her mother.       Allergies:  Allergies   Allergen Reactions   • Bupropion Nausea Only   • Chantix [Varenicline]    • Sulfa Antibiotics    • Azithromycin Rash   • Levofloxacin Rash   • Penicillins Rash     Medications:  Prior to Admission medications    Medication Sig Start Date End Date Taking? Authorizing Provider   albuterol sulfate  (90 Base) MCG/ACT inhaler Inhale 2 puffs Every 4 (Four) Hours As Needed for Wheezing. 1/21/20   Sundeep Aldridge MD   aspirin 81 MG EC tablet Take 81 mg by mouth Daily.    ProviderSteve MD   brimonidine (ALPHAGAN) 0.2 % ophthalmic solution Administer 1 drop into the left eye 3 (Three) Times a Day.    Steve Warren MD   carvedilol (COREG) 6.25 MG tablet Take 6.25 mg by mouth 2 (Two) Times a Day With Meals.    Steve Warren MD   cholecalciferol (VITAMIN D3) 1000 units tablet Take 2,000 Units by mouth Daily.    Steve Warren MD   dorzolamide (TRUSOPT) 2 % ophthalmic solution Administer 1 drop into the left eye 2 (Two) Times a Day.    Steve Warren MD   famotidine (PEPCID) 20 MG tablet Take 1 tablet by mouth 2 (Two) Times a Day. 4/29/20   Lyndon Lockett MD   fluticasone (FLONASE) 50 MCG/ACT nasal spray 2 sprays into the nostril(s) as directed by provider 2 (Two) Times a Day.    Steve Warren MD   insulin glargine (LANTUS) 100 UNIT/ML injection Inject 10 Units under the skin Every Night.    Steve Warren MD   lactobacillus acidophilus (RISAQUAD) capsule capsule Take 1 capsule by mouth Daily.  "5/3/20   Lyndon Lockett MD   Latanoprostene Bunod (Vyzulta) 0.024 % solution Administer 1 drop into the left eye Every Night. Left eye     Steve Warren MD   levothyroxine (SYNTHROID, LEVOTHROID) 25 MCG tablet Take 1 tablet by mouth Daily. 3/28/21   Roxie Schneider APRN   losartan (COZAAR) 50 MG tablet Take 1 tablet by mouth Daily. 3/29/21   Roxie Schneider APRN   Netarsudil Dimesylate (RHOPRESSA) 0.02 % solution Administer 1 drop into the left eye Every Night.    Steve Warren MD   nicotine (NICODERM CQ) 21 MG/24HR patch Place 1 patch on the skin as directed by provider Daily. 4/30/20   Lyndon Lockett MD   ondansetron (ZOFRAN) 4 MG tablet Take 1 tablet by mouth Every 6 (Six) Hours As Needed for Nausea or Vomiting. 4/29/20   Lyndon Lockett MD   oxyCODONE-acetaminophen (PERCOCET) 7.5-325 MG per tablet Take 1 tablet by mouth Every 6 (Six) Hours As Needed for Severe Pain . 4/1/21   Jensen Michele MD   pravastatin (PRAVACHOL) 10 MG tablet Take 10 mg by mouth Every Night.    Steve Warren MD   pregabalin (LYRICA) 50 MG capsule Take 50 mg by mouth 2 (Two) Times a Day.    Steve Warren MD   sertraline (ZOLOFT) 25 MG tablet Take 25 mg by mouth Daily.    Steve Warren MD   sevelamer (RENAGEL) 800 MG tablet Take 2,400 mg by mouth 3 (Three) Times a Day With Meals.    Steve Warren MD   timolol (TIMOPTIC) 0.5 % ophthalmic solution Administer 1 drop into the left eye Every Morning.    Steve Warren MD     Objective     Vital Signs: /63   Pulse 88   Temp 97.7 °F (36.5 °C) (Oral)   Resp 13   Ht 165.1 cm (65\")   Wt 67.2 kg (148 lb 2.4 oz)   SpO2 93%   BMI 24.65 kg/m²   Physical Exam   Gen.: Chronically ill-appearing white female in no acute distress moaning  HEENT: Atraumatic, normocephalic.  Pupils equal, round, and reactive to light. Extraocular movements intact.  Sclerae anicteric.  External ears negative.  Mucous membranes moist.  Neck is " supple without lymphadenopathy.  No JVD is noted.  No carotid bruits are auscultated.  Oropharynx is without erythema or exudate.   Chest: Diminished breath sounds in the bases rales skilled nursing up  CV: Tachycardic rate and regular rhythm.  Normal S1-S2.  No gallops, murmurs. or rubs.  Abdomen: Soft, nontender, nondistended.  Active bowel sounds,  No hepatosplenomegaly.  No masses.  No hernias.  Extremities: No clubbing, edema, or cyanosis.  Capillary refill is normal.  Pulses are 2+ and symmetric at radial and dorsalis pedis.  Posterior tibial pulses are intact and 2+ palpable.  AV fistula left arm with thrill  Neuro: Patient is awake, confused unable to complete any further neurologic exam patient is oriented x0   Skin: Warm, dry, and intact.  No evidence of breakdown.  Sensorium: Encephalopathic agitated at times    Nursing notes and vital signs reviewed        Results Reviewed:  Lab Results (last 24 hours)     Procedure Component Value Units Date/Time    Ammonia [[id number]]  (Normal) Collected: 04/04/21 0044    Specimen: Blood Updated: 04/04/21 0128     Ammonia 17 umol/L     Comprehensive Metabolic Panel [[id number]]  (Abnormal) Collected: 04/03/21 2340    Specimen: Blood Updated: 04/04/21 0028     Glucose 100 mg/dL      BUN 45 mg/dL      Creatinine 5.52 mg/dL      Sodium 136 mmol/L      Potassium 4.9 mmol/L      Comment: Slight hemolysis detected by analyzer. Results may be affected.        Chloride 96 mmol/L      CO2 24.0 mmol/L      Calcium 9.5 mg/dL      Total Protein 6.9 g/dL      Albumin 3.90 g/dL      ALT (SGPT) 9 U/L      AST (SGOT) 26 U/L      Alkaline Phosphatase 206 U/L      Total Bilirubin 0.7 mg/dL      eGFR Non African Amer 8 mL/min/1.73      Comment: <15 Indicative of kidney failure.        eGFR   Amer --     Comment: <15 Indicative of kidney failure.        Globulin 3.0 gm/dL      A/G Ratio 1.3 g/dL      BUN/Creatinine Ratio 8.2     Anion Gap 16.0 mmol/L     Narrative:      GFR Normal  >60  Chronic Kidney Disease <60  Kidney Failure <15      BNP [[id number]]  (Abnormal) Collected: 04/03/21 2340    Specimen: Blood Updated: 04/04/21 0022     proBNP 58,602.0 pg/mL     Narrative:      Among patients with dyspnea, NT-proBNP is highly sensitive for the detection of acute congestive heart failure. In addition NT-proBNP of <300 pg/ml effectively rules out acute congestive heart failure with 99% negative predictive value.    Results may be falsely decreased if patient taking Biotin.      Troponin [[id number]]  (Abnormal) Collected: 04/03/21 2340    Specimen: Blood Updated: 04/04/21 0019     Troponin T 0.099 ng/mL     Narrative:      Troponin T Reference Range:  <= 0.03 ng/mL-   Negative for AMI  >0.03 ng/mL-     Abnormal for myocardial necrosis.  Clinicians would have to utilize clinical acumen, EKG, Troponin and serial changes to determine if it is an Acute Myocardial Infarction or myocardial injury due to an underlying chronic condition.       Results may be falsely decreased if patient taking Biotin.      COVID-19,[facility] IN-HOUSE,Nasal Swab No Transport Media 3-4 HR TAT - Swab, Nasal Cavity [[id number]]  (Normal) Collected: 04/03/21 2216    Specimen: Swab from Nasal Cavity Updated: 04/03/21 2306     COVID19 Not Detected    Narrative:      Fact sheet for providers: https://www.fda.gov/media/307685/download     Fact sheet for patients: https://www.fda.gov/media/061545/download    Test performed by PCR.    Consider negative results in combination with clinical observations, patient history, and epidemiological information.    [place] Draw [[id number]] Collected: 04/03/21 2155    Specimen: Blood Updated: 04/03/21 2300    Narrative:      The following orders were created for panel order [place] Draw.  Procedure                               Abnormality         Status                     ---------                               -----------         ------                     Light Blue Top[[id number]]                                    Final result               Green Top (Gel)[[id number]]                                  Final result               Lavender Top[[id number]]                                     Final result               Red Top[[id number]]                                          Final result               Gray Top - Ice[[id number]]                                   Final result                 Please view results for these tests on the individual orders.    Lavender Top [[id number]] Collected: 04/03/21 2155    Specimen: Blood Updated: 04/03/21 2300     Extra Tube hold for add-on     Comment: Auto resulted       Gray Top - Ice [[id number]] Collected: 04/03/21 2155    Specimen: Blood Updated: 04/03/21 2300     Extra Tube Hold for add-ons.     Comment: Auto resulted.       Light Blue Top [[id number]] Collected: 04/03/21 2155    Specimen: Blood Updated: 04/03/21 2300     Extra Tube hold for add-on     Comment: Auto resulted       Green Top (Gel) [[id number]] Collected: 04/03/21 2155    Specimen: Blood Updated: 04/03/21 2300     Extra Tube Hold for add-ons.     Comment: Auto resulted.       Red Top [[id number]] Collected: 04/03/21 2155    Specimen: Blood Updated: 04/03/21 2300     Extra Tube Hold for add-ons.     Comment: Auto resulted.       POC Occult Blood Stool [[id number]]  (Abnormal) Collected: 04/03/21 2255    Specimen: Stool Updated: 04/03/21 2256     Fecal Occult Blood Positive     Lot Number \[id number]\     Expiration Date \12/31/2021\     DEVELOPER LOT NUMBER \195\     DEVELOPER EXPIRATION DATE \12/31/2021\     Positive Control Positive     Negative Control Negative    Urinalysis, Microscopic Only - Urine, Catheter In/Out [[id number]]  (Abnormal) Collected: 04/03/21 2220    Specimen: Urine, Catheter In/Out Updated: 04/03/21 2249     RBC, UA 0-2 /HPF      WBC, UA 0-2 /HPF      Bacteria, UA Trace /HPF      Squamous Epithelial Cells, UA None Seen /HPF      Hyaline Casts, UA None Seen /LPF      Amorphous Crystals, UA  Moderate/2+ /HPF      Methodology Manual Light Microscopy    Urine Drug Screen - Urine, Catheter [[id number]]  (Abnormal) Collected: 04/03/21 2221    Specimen: Urine, Catheter Updated: 04/03/21 2241     THC, Screen, Urine Negative     Phencyclidine (PCP), Urine Negative     Cocaine Screen, Urine Negative     Methamphetamine, Ur Negative     Opiate Screen Negative     Amphetamine Screen, Urine Negative     Benzodiazepine Screen, Urine Negative     Tricyclic Antidepressants Screen Negative     Methadone Screen, Urine Negative     Barbiturates Screen, Urine Negative     Oxycodone Screen, Urine Positive     Propoxyphene Screen Negative     Buprenorphine, Screen, Urine Negative    Narrative:      Cutoff For Drugs Screened:    Amphetamines               500 ng/ml  Barbiturates               200 ng/ml  Benzodiazepines            150 ng/ml  Cocaine                    150 ng/ml  Methadone                  200 ng/ml  Opiates                    100 ng/ml  Phencyclidine               25 ng/ml  THC                            50 ng/ml  Methamphetamine            500 ng/ml  Tricyclic Antidepressants  300 ng/ml  Oxycodone                  100 ng/ml  Propoxyphene               300 ng/ml  Buprenorphine               10 ng/ml    The normal value for all drugs tested is negative. This report includes unconfirmed screening results, with the cutoff values listed, to be used for medical treatment purposes only.  Unconfirmed results must not be used for non-medical purposes such as employment or legal testing.  Clinical consideration should be applied to any drug of abuse test, particularly when unconfirmed results are used.      Urinalysis With Culture If Indicated - Urine, Catheter In/Out [[id number]]  (Abnormal) Collected: 04/03/21 2220    Specimen: Urine, Catheter In/Out Updated: 04/03/21 2236     Color, UA Dark Yellow     Appearance, UA Clear     pH, UA 6.0     Specific Gravity, UA 1.020     Glucose,  mg/dL (1+)     Ketones, UA  Negative     Bilirubin, UA Negative     Blood, UA Small (1+)     Protein, UA >=300 mg/dL (3+)     Leuk Esterase, UA Trace     Nitrite, UA Negative     Urobilinogen, UA 0.2 E.U./dL    Salicylate Level [[id number]]  (Normal) Collected: 04/03/21 2155    Specimen: Blood Updated: 04/03/21 2231     Salicylate <0.3 mg/dL     Acetaminophen Level [[id number]]  (Normal) Collected: 04/03/21 2155    Specimen: Blood Updated: 04/03/21 2231     Acetaminophen <5.0 mcg/mL     Magnesium [[id number]]  (Abnormal) Collected: 04/03/21 2155    Specimen: Blood Updated: 04/03/21 2223     Magnesium 2.5 mg/dL     Protime-INR [[id number]]  (Abnormal) Collected: 04/03/21 2155    Specimen: Blood Updated: 04/03/21 2217     Protime 14.4 Seconds      INR 1.16    CBC & Differential [[id number]]  (Abnormal) Collected: 04/03/21 2155    Specimen: Blood Updated: 04/03/21 2210    Narrative:      The following orders were created for panel order CBC & Differential.  Procedure                               Abnormality         Status                     ---------                               -----------         ------                     CBC Auto Differential[[id number]]        Abnormal            Final result                 Please view results for these tests on the individual orders.    CBC Auto Differential [[id number]]  (Abnormal) Collected: 04/03/21 2155    Specimen: Blood Updated: 04/03/21 2210     WBC 3.77 10*3/mm3      RBC 3.16 10*6/mm3      Hemoglobin 9.7 g/dL      Hematocrit 30.3 %      MCV 95.9 fL      MCH 30.7 pg      MCHC 32.0 g/dL      RDW 16.6 %      RDW-SD 57.3 fl      MPV 9.6 fL      Platelets 177 10*3/mm3      Neutrophil % 58.6 %      Lymphocyte % 20.4 %      Monocyte % 14.1 %      Eosinophil % 5.8 %      Basophil % 0.8 %      Immature Grans % 0.3 %      Neutrophils, Absolute 2.21 10*3/mm3      Lymphocytes, Absolute 0.77 10*3/mm3      Monocytes, Absolute 0.53 10*3/mm3      Eosinophils, Absolute 0.22 10*3/mm3      Basophils, Absolute 0.03  10*3/mm3      Immature Grans, Absolute 0.01 10*3/mm3      nRBC 0.0 /100 WBC     Blood Gas, Arterial - [[id number]]  (Abnormal) Collected: 04/03/21 2201    Specimen: Arterial Blood Updated: 04/03/21 2207     Site Right Radial     Rustam's Test Positive     pH, Arterial 7.430 pH units      pCO2, Arterial 39.3 mm Hg      pO2, Arterial 72.7 mm Hg      Comment: 84 Value below reference range        HCO3, Arterial 26.1 mmol/L      Comment: 83 Value above reference range        Base Excess, Arterial 1.7 mmol/L      O2 Saturation, Arterial 96.4 %      Temperature 37.0 C      Barometric Pressure for Blood Gas 758 mmHg      Modality Room Air     Ventilator Mode NA     Collected by [id number]     Comment: Meter: [id number]     :  [id number]        pCO2, Temperature Corrected 39.3 mm Hg      pH, Temp Corrected 7.430 pH Units      pO2, Temperature Corrected 72.7 mm Hg     POC Glucose Once [[id number]]  (Normal) Collected: 04/03/21 2139    Specimen: Blood Updated: 04/03/21 2150     Glucose 130 mg/dL      Comment: : [id number] Solitarioerich NatalieMeter ID: [id number]           Imaging Results (Last 24 Hours)     Procedure Component Value Units Date/Time    XR Chest 1 View [[id number]] Resulted: 04/03/21 2242     Updated: 04/03/21 2242    CT Head Without Contrast [[id number]] Resulted: 04/03/21 2210     Updated: 04/03/21 2214        I have personally reviewed and interpreted the radiology studies and ECG obtained at time of admission.     Assessment / Plan     Assessment:   Active Hospital Problems    Diagnosis    • **Encephalopathy, metabolic, due to uremia    • Altered mental status    • Heme positive stool    • Closed fracture of right proximal humerus    • Non-compliance with renal dialysis (CMS/[facility])    • Anemia due to chronic kidney disease, on chronic dialysis (CMS/[facility])    • Type 2 diabetes mellitus, with long-term current use of insulin (CMS/[facility])    • End stage renal failure on dialysis (CMS/[facility])    • HTN (hypertension)     1.  Uremic encephalopathy post admit the patient Monia level normal will consult nephrology for dialysis.  Hold medications that can cause confusion such as Percocet and Lyrica.  2.  Heme positive stool patient has been started on Protonix drip will check substrates, continue Protonix for now, consult GI for recommendations, serial H&H's type and screen transfuse as needed.  We will also hold aspirin.  3.  Type 2 diabetes Accu-Chek sliding scale insulin check A1c.  Resume home medications as appropriate.  4.  End-stage renal disease on dialysis resume dialysis encourage compliance.  5.  Hypertension currently well controlled continue current medications monitor BP.  6.  Closed fracture right proximal humerus patient is not currently wearing a sling we will get her 1.  Encourage compliance with this.      Patient seen after midnight         Code Status: Full     I discussed the patient's findings and my recommendations with the ER physician.  Unable to determine who surrogate healthcare decision maker is at this time.    Estimated length of stay greater than 2 nights.    Patient seen and examined by me on April 4, 2021 at 1:30 AM.    Electronically signed by Tejinder Kent MD, 04/04/21, 02:15 CDT.

## 2021-04-04 NOTE — PLAN OF CARE
Goal Outcome Evaluation:        Outcome Summary: Dialysis treatment provided with 2.8L removed. Awake and disoriented to place, situation, and time. C/O thirst but remains NPO. Continue to monitor.

## 2021-04-04 NOTE — PAYOR COMM NOTE
"ADMIT INPT 4-3-21  UR  [id number]    Mini Peña (61 y.o. Female)     Date of Birth Social Security Number Address Home Phone MRN    1960  [id number] [place] [id number] [phone] [id number]    Catholic Marital Status          Other Single       Admission Date Admission Type Admitting Provider Attending Provider Department, Room/Bed    4/3/21 Emergency Jaswant Villegas MD Thompson, Benjamin H, MD [facility] INTENSIVE CARE, I006/1    Discharge Date Discharge Disposition Discharge Destination                       Attending Provider: Jaswant Villegas MD    Allergies: Bupropion, Chantix [Varenicline], Sulfa Antibiotics, Azithromycin, Levofloxacin, Penicillins    Isolation: None   Infection: COVID (rule out) (04/03/21)   Code Status: CPR    Ht: 165.1 cm (65\")   Wt: 67.2 kg (148 lb 2.4 oz)    Admission Cmt: None   Principal Problem: Encephalopathy, metabolic, due to uremia [G93.41]                 Active Insurance as of 4/3/2021     Primary Coverage     Payor Plan Insurance Group Employer/Plan Group    [employer] [place] [employer] [place]ICAID      Payor Plan Address Payor Plan Phone Number Payor Plan Fax Number Effective Dates    [address] [address] [phone]  11/4/2016 - None Entered    [facility] [id number]       Subscriber Name Subscriber Birth Date Member ID       MINI PEÑA [id number]                 Emergency Contacts      (Rel.) Home Phone Work Phone Mobile Phone    Julio Cesar Peña (Son) [phone] -- [phone]    JuliusErica engel (Daughter) [phone] -- [phone]    TawnyaFabienn (Other) [phone] -- [phone]               History & Physical      Tejinder Kent MD at 04/04/21 0130              [facility] Medicine Services  HISTORY AND PHYSICAL    Date of Admission: 4/3/2021  Primary Care Physician: Bharati Dahl APRN    Subjective     Chief Complaint: Confusion    History of Present " Illness  Patient is a 61-year-old white female past medical history of end-stage renal disease on  dialysis schedule.  She has a significant history of noncompliance with her dialysis.  Apparently she did not go today uncertain when she went last because of increased agitation and confusion.  She has done this in the past and become uremic and encephalopathic.  She was just hospitalized recently for missing dialysis after a fall and having a proximal humeral neck fracture.  She had subsequently been back in the ER due to noncontrolled pain and was prescribed Percocet 2 days prior to presentation now.  Her electrolytes are okay but her BUN and creatinine are up from baseline.  She is also worsening anemia rectal exam revealed positive stools.  Uncertain if she has had any melena or any other problems regarding this is yet unable to get any type of history out of the patient.  Also does moan and mumble and not communicating at all.  No family is present.        Review of Systems   Unable to obtain as patient is encephalopathic  Otherwise complete ROS reviewed and negative except as mentioned in the HPI.    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    • Chronic otitis media    • Diabetes (CMS/HCC)    • End stage renal disease on dialysis (CMS/[facility])    • Eustachian tube dysfunction    • Glaucoma    • HTN (hypertension)    • Mucoid otitis media    • Noncompliance of patient with renal dialysis (CMS/[facility])    • Tobacco abuse      Past Surgical History:  Past Surgical History:   Procedure Laterality Date   • ARTERIOVENOUS FISTULA     • CATARACT EXTRACTION WITH INTRAOCULAR LENS IMPLANT     •  SECTION     • CHOLECYSTECTOMY     • MYRINGOTOMY W/ TUBES Bilateral    • MYRINGOTOMY W/ TUBES Right    • TUBAL ABDOMINAL LIGATION       Social History:  reports that she has been smoking cigarettes. She has a 12.00 pack-year smoking  history. She has never used smokeless tobacco. She reports that she does not drink alcohol and does not use drugs.    Family History: family history includes Diabetes in her father; Heart disease in her mother.       Allergies:  Allergies   Allergen Reactions   • Bupropion Nausea Only   • Chantix [Varenicline]    • Sulfa Antibiotics    • Azithromycin Rash   • Levofloxacin Rash   • Penicillins Rash     Medications:  Prior to Admission medications    Medication Sig Start Date End Date Taking? Authorizing Provider   albuterol sulfate  (90 Base) MCG/ACT inhaler Inhale 2 puffs Every 4 (Four) Hours As Needed for Wheezing. 1/21/20   Sundeep Aldridge MD   aspirin 81 MG EC tablet Take 81 mg by mouth Daily.    Steve Warren MD   brimonidine (ALPHAGAN) 0.2 % ophthalmic solution Administer 1 drop into the left eye 3 (Three) Times a Day.    Steve Warren MD   carvedilol (COREG) 6.25 MG tablet Take 6.25 mg by mouth 2 (Two) Times a Day With Meals.    Steve Warren MD   cholecalciferol (VITAMIN D3) 1000 units tablet Take 2,000 Units by mouth Daily.    Steve Warren MD   dorzolamide (TRUSOPT) 2 % ophthalmic solution Administer 1 drop into the left eye 2 (Two) Times a Day.    Steve Warren MD   famotidine (PEPCID) 20 MG tablet Take 1 tablet by mouth 2 (Two) Times a Day. 4/29/20   Lyndon Lockett MD   fluticasone (FLONASE) 50 MCG/ACT nasal spray 2 sprays into the nostril(s) as directed by provider 2 (Two) Times a Day.    Steve Warren MD   insulin glargine (LANTUS) 100 UNIT/ML injection Inject 10 Units under the skin Every Night.    Steve Warren MD   lactobacillus acidophilus (RISAQUAD) capsule capsule Take 1 capsule by mouth Daily. 5/3/20   Lyndon Lockett MD   Latanoprostene Bunod (Vyzulta) 0.024 % solution Administer 1 drop into the left eye Every Night. Left eye     Steve Warren MD   levothyroxine (SYNTHROID, LEVOTHROID) 25 MCG tablet Take 1 tablet  "by mouth Daily. 3/28/21   Roxie Schneider APRN   losartan (COZAAR) 50 MG tablet Take 1 tablet by mouth Daily. 3/29/21   Roxie Schneider APRN   Netarsudil Dimesylate (RHOPRESSA) 0.02 % solution Administer 1 drop into the left eye Every Night.    Steve Warren MD   nicotine (NICODERM CQ) 21 MG/24HR patch Place 1 patch on the skin as directed by provider Daily. 4/30/20   Lyndon Lockett MD   ondansetron (ZOFRAN) 4 MG tablet Take 1 tablet by mouth Every 6 (Six) Hours As Needed for Nausea or Vomiting. 4/29/20   Lyndon Lockett MD   oxyCODONE-acetaminophen (PERCOCET) 7.5-325 MG per tablet Take 1 tablet by mouth Every 6 (Six) Hours As Needed for Severe Pain . 4/1/21   Jensen Michele MD   pravastatin (PRAVACHOL) 10 MG tablet Take 10 mg by mouth Every Night.    Steve Warren MD   pregabalin (LYRICA) 50 MG capsule Take 50 mg by mouth 2 (Two) Times a Day.    Steve Warren MD   sertraline (ZOLOFT) 25 MG tablet Take 25 mg by mouth Daily.    Steve Warren MD   sevelamer (RENAGEL) 800 MG tablet Take 2,400 mg by mouth 3 (Three) Times a Day With Meals.    Steve Warren MD   timolol (TIMOPTIC) 0.5 % ophthalmic solution Administer 1 drop into the left eye Every Morning.    Steve Warren MD     Objective     Vital Signs: /63   Pulse 88   Temp 97.7 °F (36.5 °C) (Oral)   Resp 13   Ht 165.1 cm (65\")   Wt 67.2 kg (148 lb 2.4 oz)   SpO2 93%   BMI 24.65 kg/m²   Physical Exam   Gen.: Chronically ill-appearing white female in no acute distress moaning  HEENT: Atraumatic, normocephalic.  Pupils equal, round, and reactive to light. Extraocular movements intact.  Sclerae anicteric.  External ears negative.  Mucous membranes moist.  Neck is supple without lymphadenopathy.  No JVD is noted.  No carotid bruits are auscultated.  Oropharynx is without erythema or exudate.   Chest: Diminished breath sounds in the bases [facility] up  CV: Tachycardic rate and regular rhythm.  " Normal S1-S2.  No gallops, murmurs. or rubs.  Abdomen: Soft, nontender, nondistended.  Active bowel sounds,  No hepatosplenomegaly.  No masses.  No hernias.  Extremities: No clubbing, edema, or cyanosis.  Capillary refill is normal.  Pulses are 2+ and symmetric at radial and dorsalis pedis.  Posterior tibial pulses are intact and 2+ palpable.  AV fistula left arm with thrill  Neuro: Patient is awake, confused unable to complete any further neurologic exam patient is oriented x0   Skin: Warm, dry, and intact.  No evidence of breakdown.  Sensorium: Encephalopathic agitated at times    Nursing notes and vital signs reviewed        Results Reviewed:  Lab Results (last 24 hours)     Procedure Component Value Units Date/Time    Ammonia [[id number]]  (Normal) Collected: 04/04/21 0044    Specimen: Blood Updated: 04/04/21 0128     Ammonia 17 umol/L     Comprehensive Metabolic Panel [[id number]]  (Abnormal) Collected: 04/03/21 2340    Specimen: Blood Updated: 04/04/21 0028     Glucose 100 mg/dL      BUN 45 mg/dL      Creatinine 5.52 mg/dL      Sodium 136 mmol/L      Potassium 4.9 mmol/L      Comment: Slight hemolysis detected by analyzer. Results may be affected.        Chloride 96 mmol/L      CO2 24.0 mmol/L      Calcium 9.5 mg/dL      Total Protein 6.9 g/dL      Albumin 3.90 g/dL      ALT (SGPT) 9 U/L      AST (SGOT) 26 U/L      Alkaline Phosphatase 206 U/L      Total Bilirubin 0.7 mg/dL      eGFR Non African Amer 8 mL/min/1.73      Comment: <15 Indicative of kidney failure.        eGFR   Amer --     Comment: <15 Indicative of kidney failure.        Globulin 3.0 gm/dL      A/G Ratio 1.3 g/dL      BUN/Creatinine Ratio 8.2     Anion Gap 16.0 mmol/L     Narrative:      GFR Normal >60  Chronic Kidney Disease <60  Kidney Failure <15      BNP [[id number]]  (Abnormal) Collected: 04/03/21 2340    Specimen: Blood Updated: 04/04/21 0022     proBNP 58,602.0 pg/mL     Narrative:      Among patients with dyspnea, NT-proBNP is highly  sensitive for the detection of acute congestive heart failure. In addition NT-proBNP of <300 pg/ml effectively rules out acute congestive heart failure with 99% negative predictive value.    Results may be falsely decreased if patient taking Biotin.      Troponin [[id number]]  (Abnormal) Collected: 04/03/21 2340    Specimen: Blood Updated: 04/04/21 0019     Troponin T 0.099 ng/mL     Narrative:      Troponin T Reference Range:  <= 0.03 ng/mL-   Negative for AMI  >0.03 ng/mL-     Abnormal for myocardial necrosis.  Clinicians would have to utilize clinical acumen, EKG, Troponin and serial changes to determine if it is an Acute Myocardial Infarction or myocardial injury due to an underlying chronic condition.       Results may be falsely decreased if patient taking Biotin.      COVID-19,[facility] IN-HOUSE,Nasal Swab No Transport Media 3-4 HR TAT - Swab, Nasal Cavity [[id number]]  (Normal) Collected: 04/03/21 2216    Specimen: Swab from Nasal Cavity Updated: 04/03/21 2306     COVID19 Not Detected    Narrative:      Fact sheet for providers: https://www.fda.gov/media/613934/download     Fact sheet for patients: https://www.fda.gov/media/945056/download    Test performed by PCR.    Consider negative results in combination with clinical observations, patient history, and epidemiological information.    [place] Draw [[id number]] Collected: 04/03/21 2155    Specimen: Blood Updated: 04/03/21 2300    Narrative:      The following orders were created for panel order [place] Draw.  Procedure                               Abnormality         Status                     ---------                               -----------         ------                     Light Blue Top[[id number]]                                   Final result               Green Top (Gel)[[id number]]                                  Final result               Lavender Top[[id number]]                                     Final result               Red Top[[id number]]                                           Final result               Gray Top - Ice[[id number]]                                   Final result                 Please view results for these tests on the individual orders.    Lavender Top [[id number]] Collected: 04/03/21 2155    Specimen: Blood Updated: 04/03/21 2300     Extra Tube hold for add-on     Comment: Auto resulted       Gray Top - Ice [[id number]] Collected: 04/03/21 2155    Specimen: Blood Updated: 04/03/21 2300     Extra Tube Hold for add-ons.     Comment: Auto resulted.       Light Blue Top [[id number]] Collected: 04/03/21 2155    Specimen: Blood Updated: 04/03/21 2300     Extra Tube hold for add-on     Comment: Auto resulted       Green Top (Gel) [[id number]] Collected: 04/03/21 2155    Specimen: Blood Updated: 04/03/21 2300     Extra Tube Hold for add-ons.     Comment: Auto resulted.       Red Top [[id number]] Collected: 04/03/21 2155    Specimen: Blood Updated: 04/03/21 2300     Extra Tube Hold for add-ons.     Comment: Auto resulted.       POC Occult Blood Stool [[id number]]  (Abnormal) Collected: 04/03/21 2255    Specimen: Stool Updated: 04/03/21 2256     Fecal Occult Blood Positive     Lot Number \[id number]\     Expiration Date \12/31/2021\     DEVELOPER LOT NUMBER \195\     DEVELOPER EXPIRATION DATE \12/31/2021\     Positive Control Positive     Negative Control Negative    Urinalysis, Microscopic Only - Urine, Catheter In/Out [[id number]]  (Abnormal) Collected: 04/03/21 2220    Specimen: Urine, Catheter In/Out Updated: 04/03/21 2249     RBC, UA 0-2 /HPF      WBC, UA 0-2 /HPF      Bacteria, UA Trace /HPF      Squamous Epithelial Cells, UA None Seen /HPF      Hyaline Casts, UA None Seen /LPF      Amorphous Crystals, UA Moderate/2+ /HPF      Methodology Manual Light Microscopy    Urine Drug Screen - Urine, Catheter [[id number]]  (Abnormal) Collected: 04/03/21 2221    Specimen: Urine, Catheter Updated: 04/03/21 2241     THC, Screen, Urine Negative     Phencyclidine (PCP),  Urine Negative     Cocaine Screen, Urine Negative     Methamphetamine, Ur Negative     Opiate Screen Negative     Amphetamine Screen, Urine Negative     Benzodiazepine Screen, Urine Negative     Tricyclic Antidepressants Screen Negative     Methadone Screen, Urine Negative     Barbiturates Screen, Urine Negative     Oxycodone Screen, Urine Positive     Propoxyphene Screen Negative     Buprenorphine, Screen, Urine Negative    Narrative:      Cutoff For Drugs Screened:    Amphetamines               500 ng/ml  Barbiturates               200 ng/ml  Benzodiazepines            150 ng/ml  Cocaine                    150 ng/ml  Methadone                  200 ng/ml  Opiates                    100 ng/ml  Phencyclidine               25 ng/ml  THC                            50 ng/ml  Methamphetamine            500 ng/ml  Tricyclic Antidepressants  300 ng/ml  Oxycodone                  100 ng/ml  Propoxyphene               300 ng/ml  Buprenorphine               10 ng/ml    The normal value for all drugs tested is negative. This report includes unconfirmed screening results, with the cutoff values listed, to be used for medical treatment purposes only.  Unconfirmed results must not be used for non-medical purposes such as employment or legal testing.  Clinical consideration should be applied to any drug of abuse test, particularly when unconfirmed results are used.      Urinalysis With Culture If Indicated - Urine, Catheter In/Out [[id number]]  (Abnormal) Collected: 04/03/21 2220    Specimen: Urine, Catheter In/Out Updated: 04/03/21 2236     Color, UA Dark Yellow     Appearance, UA Clear     pH, UA 6.0     Specific Gravity, UA 1.020     Glucose,  mg/dL (1+)     Ketones, UA Negative     Bilirubin, UA Negative     Blood, UA Small (1+)     Protein, UA >=300 mg/dL (3+)     Leuk Esterase, UA Trace     Nitrite, UA Negative     Urobilinogen, UA 0.2 E.U./dL    Salicylate Level [[id number]]  (Normal) Collected: 04/03/21 2155     Specimen: Blood Updated: 04/03/21 2231     Salicylate <0.3 mg/dL     Acetaminophen Level [[id number]]  (Normal) Collected: 04/03/21 2155    Specimen: Blood Updated: 04/03/21 2231     Acetaminophen <5.0 mcg/mL     Magnesium [[id number]]  (Abnormal) Collected: 04/03/21 2155    Specimen: Blood Updated: 04/03/21 2223     Magnesium 2.5 mg/dL     Protime-INR [[id number]]  (Abnormal) Collected: 04/03/21 2155    Specimen: Blood Updated: 04/03/21 2217     Protime 14.4 Seconds      INR 1.16    CBC & Differential [[id number]]  (Abnormal) Collected: 04/03/21 2155    Specimen: Blood Updated: 04/03/21 2210    Narrative:      The following orders were created for panel order CBC & Differential.  Procedure                               Abnormality         Status                     ---------                               -----------         ------                     CBC Auto Differential[[id number]]        Abnormal            Final result                 Please view results for these tests on the individual orders.    CBC Auto Differential [[id number]]  (Abnormal) Collected: 04/03/21 2155    Specimen: Blood Updated: 04/03/21 2210     WBC 3.77 10*3/mm3      RBC 3.16 10*6/mm3      Hemoglobin 9.7 g/dL      Hematocrit 30.3 %      MCV 95.9 fL      MCH 30.7 pg      MCHC 32.0 g/dL      RDW 16.6 %      RDW-SD 57.3 fl      MPV 9.6 fL      Platelets 177 10*3/mm3      Neutrophil % 58.6 %      Lymphocyte % 20.4 %      Monocyte % 14.1 %      Eosinophil % 5.8 %      Basophil % 0.8 %      Immature Grans % 0.3 %      Neutrophils, Absolute 2.21 10*3/mm3      Lymphocytes, Absolute 0.77 10*3/mm3      Monocytes, Absolute 0.53 10*3/mm3      Eosinophils, Absolute 0.22 10*3/mm3      Basophils, Absolute 0.03 10*3/mm3      Immature Grans, Absolute 0.01 10*3/mm3      nRBC 0.0 /100 WBC     Blood Gas, Arterial - [[id number]]  (Abnormal) Collected: 04/03/21 2201    Specimen: Arterial Blood Updated: 04/03/21 2207     Site Right Radial     Rustam's Test Positive      pH, Arterial 7.430 pH units      pCO2, Arterial 39.3 mm Hg      pO2, Arterial 72.7 mm Hg      Comment: 84 Value below reference range        HCO3, Arterial 26.1 mmol/L      Comment: 83 Value above reference range        Base Excess, Arterial 1.7 mmol/L      O2 Saturation, Arterial 96.4 %      Temperature 37.0 C      Barometric Pressure for Blood Gas 758 mmHg      Modality Room Air     Ventilator Mode NA     Collected by [id number]     Comment: Meter: [id number]     :  [id number]        pCO2, Temperature Corrected 39.3 mm Hg      pH, Temp Corrected 7.430 pH Units      pO2, Temperature Corrected 72.7 mm Hg     POC Glucose Once [[id number]]  (Normal) Collected: 04/03/21 2139    Specimen: Blood Updated: 04/03/21 2150     Glucose 130 mg/dL      Comment: : [id number] Garland NatalieMeter ID: [id number]           Imaging Results (Last 24 Hours)     Procedure Component Value Units Date/Time    XR Chest 1 View [[id number]] Resulted: 04/03/21 2242     Updated: 04/03/21 2242    CT Head Without Contrast [[id number]] Resulted: 04/03/21 2210     Updated: 04/03/21 2214        I have personally reviewed and interpreted the radiology studies and ECG obtained at time of admission.     Assessment / Plan     Assessment:   Active Hospital Problems    Diagnosis    • **Encephalopathy, metabolic, due to uremia    • Altered mental status    • Heme positive stool    • Closed fracture of right proximal humerus    • Non-compliance with renal dialysis (CMS/[facility])    • Anemia due to chronic kidney disease, on chronic dialysis (CMS/[facility])    • Type 2 diabetes mellitus, with long-term current use of insulin (CMS/[facility])    • End stage renal failure on dialysis (CMS/[facility])    • HTN (hypertension)    1.  Uremic encephalopathy post admit the patient Monia level normal will consult nephrology for dialysis.  Hold medications that can cause confusion such as Percocet and Lyrica.  2.  Heme positive stool patient has been started on Protonix drip will  check substrates, continue Protonix for now, consult GI for recommendations, serial H&H's type and screen transfuse as needed.  We will also hold aspirin.  3.  Type 2 diabetes Accu-Chek sliding scale insulin check A1c.  Resume home medications as appropriate.  4.  End-stage renal disease on dialysis resume dialysis encourage compliance.  5.  Hypertension currently well controlled continue current medications monitor BP.  6.  Closed fracture right proximal humerus patient is not currently wearing a sling we will get her 1.  Encourage compliance with this.      Patient seen after midnight         Code Status: Full     I discussed the patient's findings and my recommendations with the ER physician.  Unable to determine who surrogate healthcare decision maker is at this time.    Estimated length of stay greater than 2 nights.    Patient seen and examined by me on April 4, 2021 at 1:30 AM.    Electronically signed by Tejinder Kent MD, 04/04/21, 02:15 CDT.                Electronically signed by Tejinder Kent MD at 04/04/21 0222          Emergency Department Notes      Zachary Lozano, RN at 04/03/21 2306        PT CONTINUES TO WRITHE ABOUT THE BED, SPEAK UNINTELLIGABLE WORDS, AND SCREAM INTERMITTENTLY.  FAMILY REMAINS AT BEDSIDE.  SIDE RAILS RAISED X 2 AND BED REMAINS IN LOW POSITION WITH BED MONITOR I PLACE.      Zachary Lozano RN  04/03/21 2307      Electronically signed by Zachary Lozano, RN at 04/03/21 2307     Zachary Lozano, RN at 04/03/21 2322        LAB REQUESTED TO RECOLLECT SPECIMENS.      Zachary Lozano RN  04/03/21 2322      Electronically signed by Zachary Lozano RN at 04/03/21 2322     Zachary Lozano, RN at 04/03/21 2322        PHLEBOTOMY AT BEDSIDE.      Zachary Lozano RN  04/03/21 2323      Electronically signed by Zachary Lozano, RN at 04/03/21 2323     Benitez Farrell MD at 04/04/21 0024          Subjective   This patient is a 61-year-old female with a  history of end-stage renal disease requiring dialysis.  Of note she does have a previous history of missing dialysis to the point of becoming uremic.  She was supposed to be dialyzed today according to her  but did not because she had a deterioration in her mental status.  She presents via EMS with significant alteration in her mental status where she is incoherent and unable to communicate with us.  The  cannot really add much more to the history but states that he could not get her to the dialysis today because of this change in her mental status.          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    • Chronic otitis media    • Diabetes (CMS/[facility])    • End stage renal disease on dialysis (CMS/[facility])    • Eustachian tube dysfunction    • Glaucoma    • HTN (hypertension)    • Mucoid otitis media    • Noncompliance of patient with renal dialysis (CMS/[facility])    • Tobacco abuse        Allergies   Allergen Reactions   • Bupropion Nausea Only   • Chantix [Varenicline]    • Sulfa Antibiotics    • Azithromycin Rash   • Levofloxacin Rash   • Penicillins Rash       Past Surgical History:   Procedure Laterality Date   • ARTERIOVENOUS FISTULA     • CATARACT EXTRACTION WITH INTRAOCULAR LENS IMPLANT     •  SECTION     • CHOLECYSTECTOMY     • MYRINGOTOMY W/ TUBES Bilateral    • MYRINGOTOMY W/ TUBES Right    • TUBAL ABDOMINAL LIGATION         Family History   Problem Relation Age of Onset   • Heart disease Mother    • Diabetes Father        Social History     Socioeconomic History   • Marital status: Single     Spouse name: Not on file   • Number of children: Not on file   • Years of education: Not on file   • Highest education level: Not on file   Tobacco Use   • Smoking status: Current Every Day Smoker     Packs/day: 2.00     Years: 6.00     Pack years: 12.00     Types: Cigarettes   • Smokeless  tobacco: Never Used   Substance and Sexual Activity   • Alcohol use: No   • Drug use: No   • Sexual activity: Defer           Objective   Physical Exam  Vitals and nursing note reviewed.   Constitutional:       Appearance: She is well-developed. She is ill-appearing.   HENT:      Head: Normocephalic and atraumatic.      Right Ear: External ear normal.      Left Ear: External ear normal.      Nose: Nose normal.      Mouth/Throat:      Mouth: Mucous membranes are moist.      Pharynx: Oropharynx is clear.   Eyes:      Conjunctiva/sclera: Conjunctivae normal.   Cardiovascular:      Rate and Rhythm: Regular rhythm. Tachycardia present.      Heart sounds: Normal heart sounds.   Pulmonary:      Effort: Tachypnea present.      Breath sounds: Normal breath sounds.   Abdominal:      General: Bowel sounds are normal.      Palpations: Abdomen is soft.      Tenderness: There is abdominal tenderness.   Musculoskeletal:         General: Normal range of motion.      Cervical back: Normal range of motion and neck supple.   Skin:     General: Skin is warm and dry.      Capillary Refill: Capillary refill takes less than 2 seconds.   Neurological:      Mental Status: She is lethargic, disoriented and confused.   Psychiatric:         Attention and Perception: She is inattentive.         Mood and Affect: Affect is labile.         Speech: Speech is slurred.         Behavior: Behavior is combative.         Procedures          ED Course                                           MDM  Number of Diagnoses or Management Options     Amount and/or Complexity of Data Reviewed  Clinical lab tests: reviewed and ordered  Tests in the radiology section of CPT®: ordered and reviewed  Tests in the medicine section of CPT®: reviewed and ordered  Decide to obtain previous medical records or to obtain history from someone other than the patient: yes  Independent visualization of images, tracings, or specimens: yes    Patient Progress  Patient progress:  stable      Final diagnoses:   Altered mental status, unspecified altered mental status type   Uremia   Gastrointestinal hemorrhage, unspecified gastrointestinal hemorrhage type   Elevated troponin   Congestive heart failure, unspecified HF chronicity, unspecified heart failure type (CMS/[facility])       ED Disposition  ED Disposition     ED Disposition Condition Comment    Decision to Admit  Level of Care: Critical Care [6]   Diagnosis: Altered mental status, unspecified altered mental status type [[id number]]   Admitting Physician: SUE KENT [[id number]]   Attending Physician: SUE KENT [[id number]]   Certification: I Certify That Inpatient Hospital Services Are Medically Necessary For Greater Than 2 Midnights            No follow-up provider specified.       Medication List      No changes were made to your prescriptions during this visit.       The patient appears to be significantly uremic with confusion and worsened creatinine.  She also has a picture of fluid overloaded with a markedly increased BNP and a chest x-ray that suggest fluid overload.  Of note, she is dropped her hemoglobin 3 g in a week and has a positive Hemoccult on ANIYAH.  I ordered type and screen and IV Protonix for this.  The patient also has an elevated troponin which may be just a function of her elevated creatinine but this needs to be trended over time.  I discussed the case with Dr. Kent and he agrees that overall the patient needs to be in the unit because of her altered mental status and these abnormal labs and the GI bleed.     Benitez Farrell MD  04/04/21 0040      Electronically signed by Benitez Farrell MD at 04/04/21 0040       Vital Signs (last 7 days)     Date/Time   Temp   Temp src   Pulse   Resp   BP   Patient Position   SpO2    04/04/21 0930   96.9 (36.1)   Axillary   71   --   98/51   --   99    04/04/21 0900   --   --   72   17   116/52   --   99    04/04/21 0802   --   --   82   17   139/85   --   99    04/04/21  0745   --   --   74   --   114/68   --   99    04/04/21 0730   96.5 (35.8)   Axillary   --   --   --   --   --    Temp: room temp increased, blankets placed at 04/04/21 0730    04/04/21 0700   --   --   69   10   98/54   --   98    04/04/21 0600   --   --   72   13   (!) 87/47   --   100    04/04/21 0506   --   --   75   12   111/55   --   93    04/04/21 0400   97.4 (36.3)   Axillary   80   15   (!) 86/39   --   98    04/04/21 0300   --   --   85   20   108/44   --   92    04/04/21 0200   --   --   88   13   113/63   --   93    04/04/21 0148   --   --   90   23   100/75   --   95    04/04/21 0115   --   --   87   --   170/95   --   --    04/04/21 0112   --   --   --   --   --   --   99    04/04/21 0101   --   --   92   19   164/85   --   95    04/04/21 00:59:57   97.7 (36.5)   Oral   --   --   --   --   --    04/04/21 0030   --   --   90   --   171/85   --   --    04/04/21 0000   --   --   90   19   137/86   --   99    04/03/21 2358   --   --   89   --   --   --   94    04/03/21 2357   --   --   --   --   113/91   --   --    04/03/21 2316   --   --   90   18   179/85   --   96    04/03/21 2306   --   --   88   19   150/75   --   97    04/03/21 2245   --   --   90   18   167/91   --   95    04/03/21 2230   --   --   88   --   178/89   --   98    04/03/21 2217   --   --   87   18   157/78   --   93    04/03/21 2201   --   --   91   --   --   --   98    04/03/21 2200   --   --   91   --   --   --   93    04/03/21 2145   --   --   92   --   116/87   --   97    04/03/21 2143   97.9 (36.6)   Oral   87   20   157/72   Sitting   94              Oxygen Therapy (last 3 days)     Date/Time   SpO2   Device (Oxygen Therapy)   Flow (L/min)   Oxygen Concentration (%)   ETCO2 (mmHg)    04/04/21 0930   99   --   --   --   37    04/04/21 0900   99   --   --   --   34    04/04/21 0802   99   --   --   --   --    04/04/21 0748   --   nasal cannula   2   --   --    04/04/21 0745   99   --   --   --   --    04/04/21 0700   98   --   --    --   --    04/04/21 0600   100   --   --   --   --    04/04/21 0506   93   --   --   --   --    04/04/21 0400   98   room air   --   --   --    04/04/21 0300   92   --   --   --   --    04/04/21 0200   93   room air   --   --   --    04/04/21 0148   95   --   --   --   --    04/04/21 0112   99   --   --   --   --    04/04/21 0101   95   --   --   --   --    04/04/21 0000   99   --   --   --   --    04/03/21 2358   94   --   --   --   --    04/03/21 2316   96   --   --   --   --    04/03/21 2306   97   --   --   --   --    04/03/21 2245   95   --   --   --   --    04/03/21 2230   98   --   --   --   --    04/03/21 2217   93   --   --   --   --    04/03/21 2201   98   --   --   --   --    04/03/21 2200   93   --   --   --   --    04/03/21 2145   97   --   --   --   --    04/03/21 2143   94   room air   --   --   --            Intake & Output (last 3 days)       04/01 0701 - 04/02 0700 04/02 0701 - 04/03 0700 04/03 0701 - 04/04 0700 04/04 0701 - 04/05 0700    I.V. (mL/kg)    113 (1.7)    IV Piggyback   561 66    Total Intake(mL/kg)   561 (8.3) 179 (2.7)    Net   +561 +179                 Lines, Drains & Airways    Active LDAs     Name:   Placement date:   Placement time:   Site:   Days:    Peripheral IV 03/26/21 2309 Right Hand   03/26/21 2309    Hand   8    Peripheral IV 04/03/21 2136 Anterior;Right Hand   04/03/21 2136    Hand   less than 1    Peripheral IV 04/03/21 2155 Right Wrist   04/03/21 2155    Wrist   less than 1    External Urinary Catheter   04/04/21    0200    --   less than 1         Inactive LDAs     None                  Facility-Administered Medications as of 4/4/2021   Medication Dose Route Frequency Provider Last Rate Last Admin   • acetaminophen (TYLENOL) tablet 650 mg  650 mg Oral Q4H PRN Tejinder Kent MD        Or   • acetaminophen (TYLENOL) suppository 650 mg  650 mg Rectal Q4H PRN Tejinder Kent MD       • brimonidine (ALPHAGAN) 0.2 % ophthalmic solution 1 drop  1 drop Left Eye  TID Tejinder Kent MD   1 drop at 04/04/21 0812   • dextrose (D50W) 25 g/ 50mL Intravenous Solution 25 g  25 g Intravenous Q15 Min PRN Tejinder Kent MD       • dextrose (GLUTOSE) oral gel 15 g  15 g Oral Q15 Min PRN Tejinder Kent MD       • dorzolamide (TRUSOPT) 2 % ophthalmic solution 1 drop  1 drop Left Eye BID Tejinder Kent MD   1 drop at 04/04/21 0811   • fluticasone (FLONASE) 50 MCG/ACT nasal spray 2 spray  2 spray Nasal BID Tejinder Kent MD   2 spray at 04/04/21 0812   • [COMPLETED] furosemide (LASIX) injection 100 mg  100 mg Intravenous Once Benitez Farrell MD   100 mg at 04/04/21 0053   • glucagon (human recombinant) (GLUCAGEN DIAGNOSTIC) injection 1 mg  1 mg Subcutaneous Q15 Min PRN Tejinder Kent MD       • insulin lispro (humaLOG) injection 0-9 Units  0-9 Units Subcutaneous TID AC Tejinder Kent MD       • ipratropium-albuterol (DUO-NEB) nebulizer solution 3 mL  3 mL Nebulization Q6H PRN Tejinder Kent MD       • latanoprost (XALATAN) 0.005 % ophthalmic solution 1 drop  1 drop Left Eye Nightly Tejinder Kent MD       • levothyroxine (SYNTHROID, LEVOTHROID) tablet 25 mcg  25 mcg Oral Q AM Tejinder Kent MD       • Morphine sulfate (PF) injection 0.5 mg  0.5 mg Intravenous Q4H PRN Jaswant Villegas MD       • nicotine (NICODERM CQ) 21 MG/24HR patch 1 patch  1 patch Transdermal Q AM Tejinder Kent MD   1 patch at 04/04/21 0407   • pantoprazole (PROTONIX) 40 mg in 100mL NS IVPB  8 mg/hr Intravenous Continuous Tejinder Kent MD 20 mL/hr at 04/04/21 1011 8 mg/hr at 04/04/21 1011   • [COMPLETED] pantoprazole (PROTONIX) injection 80 mg  80 mg Intravenous Once Benitez Farrell MD   80 mg at 04/04/21 0052   • pravastatin (PRAVACHOL) tablet 10 mg  10 mg Oral Nightly Tejinder Kent MD       • sertraline (ZOLOFT) tablet 25 mg  25 mg Oral Daily Tejinder Kent MD       • sevelamer (RENVELA) tablet 2,400 mg  2,400 mg Oral TID With Meals Donte  Tejinder WILHELM MD       • [COMPLETED] sodium chloride 0.9 % bolus 500 mL  500 mL Intravenous Once Benitez Farrell MD   Stopped at 04/03/21 5358   • sodium chloride 0.9 % flush 10 mL  10 mL Intravenous PRN Tejinder Kent MD       • sodium chloride 0.9 % flush 10 mL  10 mL Intravenous PRN Tejinder Kent MD       • sodium chloride 0.9 % flush 10 mL  10 mL Intravenous Q12H Tejinder Kent MD   10 mL at 04/04/21 0812   • sodium chloride 0.9 % flush 10 mL  10 mL Intravenous PRN Tejinder Kent MD       • sodium chloride 0.9 % infusion  50 mL/hr Intravenous Continuous Tejinder Kent MD 50 mL/hr at 04/04/21 0516 50 mL/hr at 04/04/21 0516   • timolol (TIMOPTIC) 0.5 % ophthalmic solution 1 drop  1 drop Left Eye Q AM Tejinder Kent MD   1 drop at 04/04/21 0511   • [COMPLETED] ziprasidone (GEODON) injection 10 mg  10 mg Intramuscular Once Tejinder Kent MD   10 mg at 04/04/21 0312       Orders (last 48 hrs)      Start     Ordered    04/05/21 0600  CBC (No Diff)  Morning Draw      04/04/21 0721 04/05/21 0600  Comprehensive Metabolic Panel  Morning Draw      04/04/21 0721 04/04/21 2100  insulin detemir (LEVEMIR) injection 10 Units  Nightly,   Status:  Discontinued      04/04/21 0219 04/04/21 2100  latanoprost (XALATAN) 0.005 % ophthalmic solution 1 drop  Nightly      04/04/21 0219 04/04/21 2100  pravastatin (PRAVACHOL) tablet 10 mg  Nightly      04/04/21 0219    04/04/21 1000  Vital Signs per hospital policy  Per Hospital Policy      04/04/21 1000    04/04/21 1000  Hemodialysis inpatient  Once     Comments: Ultrafiltration: 3000 cc    04/04/21 1000    04/04/21 0900  brimonidine (ALPHAGAN) 0.2 % ophthalmic solution 1 drop  3 Times Daily      04/04/21 0219    04/04/21 0900  dorzolamide (TRUSOPT) 2 % ophthalmic solution 1 drop  2 Times Daily      04/04/21 0219 04/04/21 0900  fluticasone (FLONASE) 50 MCG/ACT nasal spray 2 spray  2 Times Daily      04/04/21 0219 04/04/21 0900  losartan  (COZAAR) tablet 50 mg  Daily,   Status:  Discontinued      04/04/21 0219 04/04/21 0900  sertraline (ZOLOFT) tablet 25 mg  Daily      04/04/21 0219 04/04/21 0900  sodium chloride 0.9 % flush 10 mL  Every 12 Hours Scheduled      04/04/21 0219 04/04/21 0800  carvedilol (COREG) tablet 6.25 mg  2 Times Daily With Meals,   Status:  Discontinued      04/04/21 0219 04/04/21 0800  sevelamer (RENVELA) tablet 2,400 mg  3 Times Daily With Meals      04/04/21 0219 04/04/21 0800  Hemoglobin & Hematocrit, Blood  Every 8 Hours      04/04/21 0219 04/04/21 0800  Troponin  Every 4 Hours      04/04/21 0721    04/04/21 0736  Procalcitonin  STAT      04/04/21 0735    04/04/21 0731  Morphine sulfate (PF) injection 0.5 mg  Every 4 Hours PRN      04/04/21 0731    04/04/21 0730  insulin lispro (humaLOG) injection 0-9 Units  3 Times Daily Before Meals      04/04/21 0219 04/04/21 0722  POC Glucose Once  Once      04/04/21 0710    04/04/21 0702  Inpatient Nephrology Consult  IN AM     Specialty:  Nephrology  Provider:  Jose Caballero MD    04/04/21 0219 04/04/21 0702  Inpatient Gastroenterology Consult  IN      Specialty:  Gastroenterology  Provider:  Mustapha Carter MD    04/04/21 0219 04/04/21 0700  POC Glucose 4x Daily AC & at Bedtime  4 Times Daily Before Meals & at Bedtime     Comments: If bedtime blood glucose is greater than 350 mg/dl, call MD.      04/04/21 0219 04/04/21 0600  levothyroxine (SYNTHROID, LEVOTHROID) tablet 25 mcg  Every Early Morning      04/04/21 0219 04/04/21 0600  timolol (TIMOPTIC) 0.5 % ophthalmic solution 1 drop  Every Early Morning      04/04/21 0219 04/04/21 0600  Comprehensive Metabolic Panel  Morning Draw      04/04/21 0219 04/04/21 0600  CBC Auto Differential  Morning Draw      04/04/21 0219 04/04/21 0600  Phosphorus  Morning Draw      04/04/21 0219    04/04/21 0600  Magnesium  Morning Draw      04/04/21 0219    04/04/21 0400  Neuro Checks  Every 4 Hours       04/04/21 0219    04/04/21 0400  ziprasidone (GEODON) injection 10 mg  Once      04/04/21 0301    04/04/21 0300  Vital Signs Every Hour and Per Hospital Policy Based on Patient Condition  Every Hour      04/04/21 0219 04/04/21 0300  Intake and Output  Every Hour      04/04/21 0219    04/04/21 0230  nicotine (NICODERM CQ) 21 MG/24HR patch 1 patch  Every Early Morning      04/04/21 0219 04/04/21 0220  Daily Weights  Daily      04/04/21 0219 04/04/21 0219  Do NOT Hold Basal or Correction Scale Insulin When Patient is NPO, Hold Scheduled Mealtime (Bolus) Insulin if NPO  Continuous      04/04/21 0219 04/04/21 0219  Follow [facility] Hypoglycemia Standing Orders For Blood Glucose Less Than 70 mg/dL  Until Discontinued     Comments: ALERT PATIENT - NOT NPO & CAN SAFELY SWALLOW  Administer 4 oz Fruit Juice OR 4 oz Regular Soda OR 8 oz Milk OR 15-30 grams (1 tube) of Glucose Gel.  Recheck Blood Glucose Approximately 15 Minutes After Ingestion, Repeat Treatment & Continue to Recheck Blood Sugar Approximately Every 15 Minutes Until Blood Glucose is 70 or Higher.  Once Blood Glucose is 70 or Higher & if It Will Be More Than 60 Minutes Until Next Meal, Provide Appropriate Snack (Including Carbohydrate Food) Based on Meal Plan Order. Give Meal Tray As Soon As Possible.    PATIENT HAS IV ACCESS - UNRESPONSIVE, NPO OR UNABLE TO SAFELY SWALLOW  Administer 25g (50ml) D50W IV Push.  Recheck Blood Glucose Approximately 15 Minutes After Administration, if Blood Glucose Remains Less Than 70, Repeat Treatment   Recheck Blood Glucose Approximately 15 Minutes After 2nd Administration, if Blood Glucose Remains Less Than 70 After 2nd Dose of D50W, Contact Provider for Further Treatment Orders & Consider Adding IVF With D5W for Maintenance    PATIENT WITHOUT IV ACCESS - UNRESPONSIVE, NPO OR UNABLE TO SAFELY SWALLOW  Administer 1mg Glucagon SQ & Establish IV Access.  Turn Patient on Side - Nausea / Vomiting May Occur.  Recheck Blood Glucose  Approximately 15 Minutes After Administration.  If Blood Glucose Remains Less Than 70, Administer 25g D50W IV Push (50ml).  Recheck Blood Glucose Approximately 15 Minutes After Administration of D50W, if Blood Glucose Remains Less Than 70, Contact Provider for Further Treatment Orders & Consider Adding IVF With D5 for Maintenance    Document Event & Patient Response to Interventions in EMR, Document Medications on MAR  Notify Provider if Hypoglycemia Treatment Needed    04/04/21 0219    04/04/21 0219  Cardiac Monitoring  Continuous      04/04/21 0219    04/04/21 0219  Continuous Pulse Oximetry  Continuous      04/04/21 0219    04/04/21 0219  Height & Weight  Once      04/04/21 0219    04/04/21 0219  Use Mobility Guidelines for Advancement of Activity  Continuous      04/04/21 0219    04/04/21 0219  Insert Peripheral IV  Once      04/04/21 0219    04/04/21 0219  Saline Lock & Maintain IV Access  Continuous      04/04/21 0219    04/04/21 0219  Place Sequential Compression Device  Once      04/04/21 0219    04/04/21 0219  Maintain Sequential Compression Device  Continuous      04/04/21 0219    04/04/21 0219  NPO Diet  Diet Effective Now      04/04/21 0219    04/04/21 0219  Tobacco Cessation Education  Once      04/04/21 0219    04/04/21 0219  Oxygen Therapy- Nasal Cannula; Titrate for SPO2: 90% - 95%  Continuous      04/04/21 0219    04/04/21 0219  Phosphorus  STAT      04/04/21 0219    04/04/21 0219  T4, Free  STAT      04/04/21 0219    04/04/21 0219  Hemoglobin A1c  STAT      04/04/21 0219    04/04/21 0219  TSH  STAT      04/04/21 0219    04/04/21 0219  Ferritin  Once      04/04/21 0219    04/04/21 0219  Iron Profile  Once      04/04/21 0219    04/04/21 0219  Reticulocytes  Once      04/04/21 0219    04/04/21 0219  Vitamin B12  Once      04/04/21 0219    04/04/21 0219  Folate  Once      04/04/21 0219    04/04/21 0219  Positioning Device To Bedside  Until Discontinued     Comments: Place right arm in sling    04/04/21  0219    04/04/21 0218  acetaminophen (TYLENOL) tablet 650 mg  Every 4 Hours PRN      04/04/21 0219    04/04/21 0218  acetaminophen (TYLENOL) suppository 650 mg  Every 4 Hours PRN      04/04/21 0219    04/04/21 0218  sodium chloride 0.9 % flush 10 mL  As Needed      04/04/21 0219    04/04/21 0218  ipratropium-albuterol (DUO-NEB) nebulizer solution 3 mL  Every 6 Hours PRN      04/04/21 0219    04/04/21 0218  albuterol (PROVENTIL) nebulizer solution 0.083% 2.5 mg/3mL  Every 6 Hours PRN,   Status:  Discontinued      04/04/21 0219    04/04/21 0218  dextrose (GLUTOSE) oral gel 15 g  Every 15 Minutes PRN      04/04/21 0219    04/04/21 0218  dextrose (D50W) 25 g/ 50mL Intravenous Solution 25 g  Every 15 Minutes PRN      04/04/21 0219    04/04/21 0218  glucagon (human recombinant) (GLUCAGEN DIAGNOSTIC) injection 1 mg  Every 15 Minutes PRN      04/04/21 0219    04/04/21 0153  Code Status and Medical Interventions:  Continuous      04/04/21 0213    04/04/21 0049  SLP Consult: Eval & Treat RN Dysphagia Screen Failed  Once      04/04/21 0048    04/04/21 0040  Inpatient Admission  Once      04/04/21 0039    04/04/21 0039  pantoprazole (PROTONIX) injection 80 mg  Once      04/04/21 0037    04/04/21 0039  pantoprazole (PROTONIX) 40 mg in 100mL NS IVPB  Continuous      04/04/21 0037    04/04/21 0038  Type & Screen  STAT      04/04/21 0037    04/04/21 0033  furosemide (LASIX) injection 100 mg  Once      04/04/21 0031    04/04/21 0029  Ammonia  STAT      04/04/21 0028    04/03/21 2317  Comprehensive Metabolic Panel  Once      04/03/21 2149 04/03/21 2317  Troponin  Once      04/03/21 2149 04/03/21 2317  BNP  Once      04/03/21 2219 04/03/21 2235  Urinalysis, Microscopic Only - Urine, Catheter In/Out  Once      04/03/21 2234 04/03/21 2228  Blood Gas, Arterial -  STAT      04/03/21 2227 04/03/21 2217  POC Occult Blood Stool  Once      04/03/21 2216 04/03/21 2214  Magnesium  Once      04/03/21 2149 04/03/21 2214   Salicylate Level  STAT      04/03/21 2201 04/03/21 2214  Acetaminophen Level  STAT      04/03/21 2201 04/03/21 2208  Blood Gas, Arterial -  Once      04/03/21 2201 04/03/21 2200  sodium chloride 0.9 % bolus 500 mL  Once      04/03/21 2158 04/03/21 2200  sodium chloride 0.9 % infusion  Continuous      04/03/21 2158 04/03/21 2158  Blood Gas, Arterial -  STAT      04/03/21 2158 04/03/21 2157  CT Head Without Contrast  1 Time Imaging      04/03/21 2158 04/03/21 2157  Urine Drug Screen - Urine, Catheter  STAT      04/03/21 2158 04/03/21 2157  COVID-19,[employer] IN-HOUSE,Nasal Swab No Transport Media 3-4 HR TAT - Swab, Nasal Cavity  Once      04/03/21 2158 04/03/21 2156  Cardiac monitoring  Per Hospital Policy,   Status:  Canceled      04/03/21 2158 04/03/21 2156  Continuous Pulse Oximetry  Continuous,   Status:  Canceled      04/03/21 2158 04/03/21 2156  Vital signs  Per Hospital Policy      04/03/21 2158 04/03/21 2156  Insert peripheral IV  Once      04/03/21 2158 04/03/21 2156  Fall precautions  Continuous      04/03/21 2158 04/03/21 2156  POC Glucose Once  Once      04/03/21 2158 04/03/21 2156  [place] Draw  Once,   Status:  Canceled      04/03/21 2158 04/03/21 2156  Urinalysis With Culture If Indicated - Urine, Catheter In/Out  Once      04/03/21 2158 04/03/21 2156  Light Blue Top  PROCEDURE ONCE,   Status:  Canceled      04/03/21 2158 04/03/21 2156  Green Top (Gel)  PROCEDURE ONCE,   Status:  Canceled      04/03/21 2158 04/03/21 2156  Lavender Top  PROCEDURE ONCE,   Status:  Canceled      04/03/21 2158 04/03/21 2156  Red Top  PROCEDURE ONCE,   Status:  Canceled      04/03/21 2158    04/03/21 2155  Oxygen Therapy- Nasal Cannula; 2 LPM; Titrate for SPO2: 92%, Greater Than or Equal To  Continuous PRN,   Status:  Canceled      04/03/21 2158 04/03/21 2155  sodium chloride 0.9 % flush 10 mL  As Needed      04/03/21 2158 04/03/21 2151  POC Glucose Once  Once       04/03/21 2139 04/03/21 2150  NPO Diet  Diet Effective Now,   Status:  Canceled      04/03/21 2149 04/03/21 2150  Undress and Gown  Once      04/03/21 2149 04/03/21 2150  Cardiac monitoring  Per Hospital Policy,   Status:  Canceled      04/03/21 2149 04/03/21 2150  Continuous Pulse Oximetry  Continuous,   Status:  Canceled      04/03/21 2149 04/03/21 2150  Vital signs  Per Hospital Policy      04/03/21 2149 04/03/21 2150  Orthostatic blood pressure  Once,   Status:  Canceled      04/03/21 2149 04/03/21 2150  Fall precautions  Continuous      04/03/21 2149 04/03/21 2150  XR Chest 1 View  1 Time Imaging      04/03/21 2149 04/03/21 2150  ECG 12 Lead  Once      04/03/21 2149 04/03/21 2150  POC Glucose Once  Once      04/03/21 2149 04/03/21 2150  Insert peripheral IV  Once      04/03/21 2149 04/03/21 2150  [place] Draw  Once      04/03/21 2149 04/03/21 2150  CBC & Differential  Once      04/03/21 2149 04/03/21 2150  Urinalysis With Microscopic If Indicated (No Culture) - Urine, Clean Catch  Once,   Status:  Canceled      04/03/21 2149 04/03/21 2150  Protime-INR  Once      04/03/21 2149 04/03/21 2150  Light Blue Top  PROCEDURE ONCE      04/03/21 2149 04/03/21 2150  Green Top (Gel)  PROCEDURE ONCE      04/03/21 2149 04/03/21 2150  Lavender Top  PROCEDURE ONCE      04/03/21 2149 04/03/21 2150  Red Top  PROCEDURE ONCE      04/03/21 2149 04/03/21 2150  Gray Top - Ice  PROCEDURE ONCE      04/03/21 2149 04/03/21 2150  CBC Auto Differential  PROCEDURE ONCE      04/03/21 2149 04/03/21 2149  Oxygen Therapy- Nasal Cannula; 2 LPM; Titrate for SPO2: 92%, Greater Than or Equal To  Continuous PRN,   Status:  Canceled      04/03/21 2149 04/03/21 2149  sodium chloride 0.9 % flush 10 mL  As Needed      04/03/21 9219              Ventilator/Non-Invasive Ventilation Settings (From admission, onward) Comment    None           Physician Progress Notes (last 48 hours)  (Notes from 04/02/21 1014 through 04/04/21 1014)      Jaswant Villegas MD at 04/04/21 0751        Patient was admitted earlier this morning by Dr. Kent and his history and physical have been reviewed.  Patient admitted with altered mental status and confusion, and recently was hospitalized from 3/26-3/28 after sustaining a fall which resulted in a proximal right humeral fracture.  She was discharged home and evidently lives with her , who assists her in taking her to dialysis.  No family at bedside this morning.    Records indicate that she is on dialysis every [facility], and did not go to dialysis yesterday for unclear reasons.  Nephrology has been consulted and appreciate them following along with us.    In the emergency department a fecal occult blood test was performed and was positive.  She has been started on a Protonix IV drip.  I have reviewed her hemoglobin trend dating back to February 2021 and is as follows:          Patient became acutely agitated overnight, and was given a dose of Geodon around 0300.  She is somnolent this morning and unable to provide much additional history at this time.    Her blood pressure trend has been reviewed.  I am going to hold her losartan and Coreg.    Her blood sugar trend also noted.  We will hold her insulin Levemir.  Cover with sliding scale insulin only at this time.  Given her current lethargy and somnolence, we will keep her n.p.o. at this time and allow the effects of Geodon to wear off, at which time hope to start a diet later today.    CT scan of the head has been obtained, and will follow up final results from radiology.    Continue to trend cardiac enzymes.  Continuous telemetry monitoring.    SCDs in place.    Recent x-ray of the right humerus on 4/1 as follows:        Right shoulder sling, and ice pack in place this morning.    CXR does reveal right pleural effusion:      Patient remains afebrile.  White blood cell count is  normal.  Will also add a procalcitonin.    ICU care.  Work-up ongoing.    Electronically signed by Jaswant Villegas MD, 04/04/21, 07:31 CDT.          Electronically signed by Jaswant Villegas MD at 04/04/21 0736          Consult Notes (last 48 hours) (Notes from 04/02/21 1014 through 04/04/21 1014)      Jose Caballero MD at 04/04/21 0954          Nephrology ([facility]) Consult Note      Patient:  Mini Gentile  YOB: 1960  Date of Service: 4/4/2021  MRN: [id number]   Acct: [id number]   Primary Care Physician: Bharati Dahl APRN  Advance Directive:   Code Status and Medical Interventions:   Ordered at: 04/04/21 0213     Code Status:    CPR     Medical Interventions (Level of Support Prior to Arrest):    Full     Admit Date: 4/3/2021       Hospital Day: 0  Referring Provider: No ref. provider found      Patient Seen, Chart, Consults, Notes, Labs, Radiology studies reviewed.    Chief complaint: Increasing shortness of breath/encephalopathy    Subjective:  Mini Gentile is a 61 y.o. female  whom we were consulted for end-stage renal disease.  She goes to [place] dialysis clinic on Tuesday Thursday Saturday.  Apparently she was not sure yesterday.  She has been noncompliant lately.  Presented as she was brought in by family as she was found to be confused, disoriented.  She has been on Percocet for pain control for right humeral neck fracture.  Patient has type 2 diabetes, hypertension, COPD, tobacco use and anemia of chronic kidney disease.    This morning she was seen in the ICU, she is sleepy/confused/encephalopathy    Allergies:  Bupropion, Chantix [varenicline], Sulfa antibiotics, Azithromycin, Levofloxacin, and Penicillins    Home Meds:  Medications Prior to Admission   Medication Sig Dispense Refill Last Dose   • albuterol sulfate  (90 Base) MCG/ACT inhaler Inhale 2 puffs Every 4 (Four) Hours As Needed for Wheezing. 18 g 0    • aspirin 81 MG EC  tablet Take 81 mg by mouth Daily.      • brimonidine (ALPHAGAN) 0.2 % ophthalmic solution Administer 1 drop into the left eye 3 (Three) Times a Day.      • carvedilol (COREG) 6.25 MG tablet Take 6.25 mg by mouth 2 (Two) Times a Day With Meals.      • cholecalciferol (VITAMIN D3) 1000 units tablet Take 2,000 Units by mouth Daily.      • dorzolamide (TRUSOPT) 2 % ophthalmic solution Administer 1 drop into the left eye 2 (Two) Times a Day.      • famotidine (PEPCID) 20 MG tablet Take 1 tablet by mouth 2 (Two) Times a Day. 60 tablet 2    • fluticasone (FLONASE) 50 MCG/ACT nasal spray 2 sprays into the nostril(s) as directed by provider 2 (Two) Times a Day.      • insulin glargine (LANTUS) 100 UNIT/ML injection Inject 10 Units under the skin Every Night.      • lactobacillus acidophilus (RISAQUAD) capsule capsule Take 1 capsule by mouth Daily. 30 capsule 2    • Latanoprostene Bunod (Vyzulta) 0.024 % solution Administer 1 drop into the left eye Every Night. Left eye       • levothyroxine (SYNTHROID, LEVOTHROID) 25 MCG tablet Take 1 tablet by mouth Daily. 30 tablet 0    • losartan (COZAAR) 50 MG tablet Take 1 tablet by mouth Daily.      • Netarsudil Dimesylate (RHOPRESSA) 0.02 % solution Administer 1 drop into the left eye Every Night.      • nicotine (NICODERM CQ) 21 MG/24HR patch Place 1 patch on the skin as directed by provider Daily. 30 patch 2    • ondansetron (ZOFRAN) 4 MG tablet Take 1 tablet by mouth Every 6 (Six) Hours As Needed for Nausea or Vomiting. 24 tablet 2    • oxyCODONE-acetaminophen (PERCOCET) 7.5-325 MG per tablet Take 1 tablet by mouth Every 6 (Six) Hours As Needed for Severe Pain . 12 tablet 0    • pravastatin (PRAVACHOL) 10 MG tablet Take 10 mg by mouth Every Night.      • pregabalin (LYRICA) 50 MG capsule Take 50 mg by mouth 2 (Two) Times a Day.      • sertraline (ZOLOFT) 25 MG tablet Take 25 mg by mouth Daily.      • sevelamer (RENAGEL) 800 MG tablet Take 2,400 mg by mouth 3 (Three) Times a Day  With Meals.      • timolol (TIMOPTIC) 0.5 % ophthalmic solution Administer 1 drop into the left eye Every Morning.          Medicines:  Current Facility-Administered Medications   Medication Dose Route Frequency Provider Last Rate Last Admin   • acetaminophen (TYLENOL) tablet 650 mg  650 mg Oral Q4H PRN Tejinder Kent MD        Or   • acetaminophen (TYLENOL) suppository 650 mg  650 mg Rectal Q4H PRN Tejinder Kent MD       • brimonidine (ALPHAGAN) 0.2 % ophthalmic solution 1 drop  1 drop Left Eye TID Tejinder Kent MD   1 drop at 04/04/21 0812   • dextrose (D50W) 25 g/ 50mL Intravenous Solution 25 g  25 g Intravenous Q15 Min PRN Tejinder Kent MD       • dextrose (GLUTOSE) oral gel 15 g  15 g Oral Q15 Min PRN Tejinder Kent MD       • dorzolamide (TRUSOPT) 2 % ophthalmic solution 1 drop  1 drop Left Eye BID Tejinder Kent MD   1 drop at 04/04/21 0811   • fluticasone (FLONASE) 50 MCG/ACT nasal spray 2 spray  2 spray Nasal BID Tejinder Kent MD   2 spray at 04/04/21 0812   • glucagon (human recombinant) (GLUCAGEN DIAGNOSTIC) injection 1 mg  1 mg Subcutaneous Q15 Min PRN Tejinder Kent MD       • insulin lispro (humaLOG) injection 0-9 Units  0-9 Units Subcutaneous TID AC Tejinder Kent MD       • ipratropium-albuterol (DUO-NEB) nebulizer solution 3 mL  3 mL Nebulization Q6H PRN Tejinder Kent MD       • latanoprost (XALATAN) 0.005 % ophthalmic solution 1 drop  1 drop Left Eye Nightly Tejinder Kent MD       • levothyroxine (SYNTHROID, LEVOTHROID) tablet 25 mcg  25 mcg Oral Q AM Tejinder Kent MD       • Morphine sulfate (PF) injection 0.5 mg  0.5 mg Intravenous Q4H PRN Jaswant Villegas MD       • nicotine (NICODERM CQ) 21 MG/24HR patch 1 patch  1 patch Transdermal Q AM Tejinder Kent MD   1 patch at 04/04/21 0407   • pantoprazole (PROTONIX) 40 mg in 100mL NS IVPB  8 mg/hr Intravenous Continuous Tejinder Kent MD 20 mL/hr at 04/04/21 0506 8 mg/hr at 04/04/21  0506   • pravastatin (PRAVACHOL) tablet 10 mg  10 mg Oral Nightly Tejinder Kent MD       • sertraline (ZOLOFT) tablet 25 mg  25 mg Oral Daily Tejinder Kent MD       • sevelamer (RENVELA) tablet 2,400 mg  2,400 mg Oral TID With Meals Tejinder Kent MD       • sodium chloride 0.9 % flush 10 mL  10 mL Intravenous PRN Tejinder Kent MD       • sodium chloride 0.9 % flush 10 mL  10 mL Intravenous PRN Tejinder Kent MD       • sodium chloride 0.9 % flush 10 mL  10 mL Intravenous Q12H Tejinder Kent MD   10 mL at 21 0812   • sodium chloride 0.9 % flush 10 mL  10 mL Intravenous PRN Tejinder Kent MD       • sodium chloride 0.9 % infusion  50 mL/hr Intravenous Continuous Tejinder Kent MD 50 mL/hr at 21 0516 50 mL/hr at 21 0516   • timolol (TIMOPTIC) 0.5 % ophthalmic solution 1 drop  1 drop Left Eye Q AM Tejinder Kent MD   1 drop at 21 0511       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    • Chronic otitis media    • Diabetes (CMS/[facility])    • End stage renal disease on dialysis (CMS/[facility])    • Eustachian tube dysfunction    • Glaucoma    • HTN (hypertension)    • Mucoid otitis media    • Noncompliance of patient with renal dialysis (CMS/[facility])    • Tobacco abuse        Past Surgical History:  Past Surgical History:   Procedure Laterality Date   • ARTERIOVENOUS FISTULA     • CATARACT EXTRACTION WITH INTRAOCULAR LENS IMPLANT     •  SECTION     • CHOLECYSTECTOMY     • MYRINGOTOMY W/ TUBES Bilateral    • MYRINGOTOMY W/ TUBES Right    • TUBAL ABDOMINAL LIGATION         Family History  Family History   Problem Relation Age of Onset   • Heart disease Mother    • Diabetes Father        Social History  Social History     Socioeconomic History   • Marital status: Single     Spouse name: Not on file   • Number of children: Not on file   • Years of education: Not on file   • Highest education level:  "Not on file   Tobacco Use   • Smoking status: Current Every Day Smoker     Packs/day: 2.00     Years: 6.00     Pack years: 12.00     Types: Cigarettes   • Smokeless tobacco: Never Used   Substance and Sexual Activity   • Alcohol use: No   • Drug use: No   • Sexual activity: Defer         Review of Systems:  Unable to obtain review of system as she is encephalopathic.  Objective:  BP 98/51   Pulse 71   Temp 96.9 °F (36.1 °C) (Axillary)   Resp 17   Ht 165.1 cm (65\")   Wt 67.2 kg (148 lb 2.4 oz)   SpO2 99%   BMI 24.65 kg/m²     Intake/Output Summary (Last 24 hours) at 4/4/2021 0954  Last data filed at 4/4/2021 0735  Gross per 24 hour   Intake 740 ml   Output --   Net 740 ml     General: Sleepy/confused  HEENT: Normocephalic atraumatic head  Neck: Supple with no JVD or carotid bruits.  Chest:  Decreased breath sound on both lung field.  CVS: regular rate and rhythm  Abdominal: soft, nontender, normal bowel sounds  Extremities: no cyanosis or edema  Skin: warm and dry without rash      Labs:    Results from last 7 days   Lab Units 04/04/21  0753 04/04/21  0247 04/03/21  2155   WBC 10*3/mm3  --  3.73 3.77   HEMOGLOBIN g/dL 10.0* 10.2* 9.7*   HEMATOCRIT % 31.1* 32.8* 30.3*   PLATELETS 10*3/mm3  --  162 177       Results from last 7 days   Lab Units 04/04/21  0247 04/03/21  2340   SODIUM mmol/L 137 136   POTASSIUM mmol/L 4.7 4.9   CHLORIDE mmol/L 98 96*   CO2 mmol/L 24.0 24.0   BUN mg/dL 47* 45*   CREATININE mg/dL 5.91* 5.52*   CALCIUM mg/dL 9.5 9.5   BILIRUBIN mg/dL 0.9 0.7   ALK PHOS U/L 229* 206*   ALT (SGPT) U/L 15 9   AST (SGOT) U/L 40* 26   GLUCOSE mg/dL 89 100*         Radiology:   Imaging Results (Last 24 Hours)     Procedure Component Value Units Date/Time    CT Head Without Contrast [[id number]] Collected: 04/04/21 0746     Updated: 04/04/21 0754    Narrative:      Exam: CT HEAD WO CONTRAST-     Indication: Altered mental status     Comparison: 3/26/2021     Dose length product: 685 mGy cm. Automated " exposure control was also  utilized to decrease patient radiation dose.     Findings:     No acute intracranial hemorrhage. No loss of gray-white differentiation.  Chronic microvascular ischemic white matter change and global cerebral  volume loss. No midline shift or mass effect. LEFT globe prosthetic  device. No acute orbital finding. Small amount of gas in the RIGHT  temporal soft tissues is likely venous related to IV placement. Mastoid  air cells and visualized paranasal sinuses are clear. No acute osseous  finding.       Impression:         1.  No acute intracranial findings.  2.  Global cerebral volume loss and presumed chronic microvascular  ischemic white matter change.  3.  RIGHT temporal soft tissue gas, likely related to IV placement.     These findings are in agreement with the critical and emergent findings  from the [facility] preliminary report.  This report was finalized on 04/04/2021 07:51 by Dr. Vasquez Villanueva MD.    XR Chest 1 View [[id number]] Collected: 04/04/21 0643     Updated: 04/04/21 0648    Narrative:      Exam: XR CHEST 1 VW-     Indication: Weak/Dizzy/AMS triage protocol     Comparison: 3/26/2021     Findings:     Cardiac silhouette is stable. No change in small to moderate RIGHT  pleural effusion with overlying atelectasis/consolidation. The LEFT lung  and pleural space appear clear. No visible pneumothorax. LEFT subclavian  stent noted. Redemonstrated RIGHT humeral surgical neck fracture with  involvement of the greater and lesser tuberosities.       Impression:      Impression:     No change in small to moderate RIGHT pleural effusion with overlying  atelectasis/consolidation.  This report was finalized on 04/04/2021 06:45 by Dr. Vasquez Villanueva MD.          Culture:  No components found for: WOUNDCUL, 3  No components found for: CSFCUL, 3  No components found for: BC, 3  No components found for: URINECUL, 3      Assessment   1.  End-stage renal disease on maintenance dialysis.  2.  Type  II diabetic nephropathy.  3.  Noncompliant dialysis patient.  4.  Metabolic encephalopathy.  5.  Anemia of chronic kidney disease.  6.  Recent right humeral fracture.    Plan:  1.  Initiate dialysis treatment today for treatment of possible uremic encephalopathy.  2.  Continue to monitor neuro status.  3.  Resume ELVIS once indicated      Thank you for the consult, we appreciate the opportunity to provide care to your patients.  Feel free to contact me if I can be of any further assistance.      Jose Caballero MD  4/4/2021  09:54 CDT    Electronically signed by Jose Caballero MD at 04/04/21 0950

## 2021-04-04 NOTE — OUTREACH NOTE
Medical Week 1 Survey      Responses   [facility] patient discharged from?  [facility]   Does the patient have one of the following disease processes/diagnoses(primary or secondary)?  Other   Week 1 attempt successful?  No   Revoke  Readmitted          Nadiya Jensen RN

## 2021-04-04 NOTE — CONSULTS
Nephrology ([facility]) Consult Note      Patient:  Mini Gentile  YOB: 1960  Date of Service: 4/4/2021  MRN: [id number]   Acct: [id number]   Primary Care Physician: Bharati Dahl APRN  Advance Directive:   Code Status and Medical Interventions:   Ordered at: 04/04/21 0213     Code Status:    CPR     Medical Interventions (Level of Support Prior to Arrest):    Full     Admit Date: 4/3/2021       Hospital Day: 0  Referring Provider: No ref. provider found      Patient Seen, Chart, Consults, Notes, Labs, Radiology studies reviewed.    Chief complaint: Increasing shortness of breath/encephalopathy    Subjective:  Mini Gentile is a 61 y.o. female  whom we were consulted for end-stage renal disease.  She goes to [place] dialysis clinic on Tuesday Thursday Saturday.  Apparently she was not sure yesterday.  She has been noncompliant lately.  Presented as she was brought in by family as she was found to be confused, disoriented.  She has been on Percocet for pain control for right humeral neck fracture.  Patient has type 2 diabetes, hypertension, COPD, tobacco use and anemia of chronic kidney disease.    This morning she was seen in the ICU, she is sleepy/confused/encephalopathy    Allergies:  Bupropion, Chantix [varenicline], Sulfa antibiotics, Azithromycin, Levofloxacin, and Penicillins    Home Meds:  Medications Prior to Admission   Medication Sig Dispense Refill Last Dose   • albuterol sulfate  (90 Base) MCG/ACT inhaler Inhale 2 puffs Every 4 (Four) Hours As Needed for Wheezing. 18 g 0    • aspirin 81 MG EC tablet Take 81 mg by mouth Daily.      • brimonidine (ALPHAGAN) 0.2 % ophthalmic solution Administer 1 drop into the left eye 3 (Three) Times a Day.      • carvedilol (COREG) 6.25 MG tablet Take 6.25 mg by mouth 2 (Two) Times a Day With Meals.      • cholecalciferol (VITAMIN D3) 1000 units tablet Take 2,000 Units by mouth Daily.      • dorzolamide  (TRUSOPT) 2 % ophthalmic solution Administer 1 drop into the left eye 2 (Two) Times a Day.      • famotidine (PEPCID) 20 MG tablet Take 1 tablet by mouth 2 (Two) Times a Day. 60 tablet 2    • fluticasone (FLONASE) 50 MCG/ACT nasal spray 2 sprays into the nostril(s) as directed by provider 2 (Two) Times a Day.      • insulin glargine (LANTUS) 100 UNIT/ML injection Inject 10 Units under the skin Every Night.      • lactobacillus acidophilus (RISAQUAD) capsule capsule Take 1 capsule by mouth Daily. 30 capsule 2    • Latanoprostene Bunod (Vyzulta) 0.024 % solution Administer 1 drop into the left eye Every Night. Left eye       • levothyroxine (SYNTHROID, LEVOTHROID) 25 MCG tablet Take 1 tablet by mouth Daily. 30 tablet 0    • losartan (COZAAR) 50 MG tablet Take 1 tablet by mouth Daily.      • Netarsudil Dimesylate (RHOPRESSA) 0.02 % solution Administer 1 drop into the left eye Every Night.      • nicotine (NICODERM CQ) 21 MG/24HR patch Place 1 patch on the skin as directed by provider Daily. 30 patch 2    • ondansetron (ZOFRAN) 4 MG tablet Take 1 tablet by mouth Every 6 (Six) Hours As Needed for Nausea or Vomiting. 24 tablet 2    • oxyCODONE-acetaminophen (PERCOCET) 7.5-325 MG per tablet Take 1 tablet by mouth Every 6 (Six) Hours As Needed for Severe Pain . 12 tablet 0    • pravastatin (PRAVACHOL) 10 MG tablet Take 10 mg by mouth Every Night.      • pregabalin (LYRICA) 50 MG capsule Take 50 mg by mouth 2 (Two) Times a Day.      • sertraline (ZOLOFT) 25 MG tablet Take 25 mg by mouth Daily.      • sevelamer (RENAGEL) 800 MG tablet Take 2,400 mg by mouth 3 (Three) Times a Day With Meals.      • timolol (TIMOPTIC) 0.5 % ophthalmic solution Administer 1 drop into the left eye Every Morning.          Medicines:  Current Facility-Administered Medications   Medication Dose Route Frequency Provider Last Rate Last Admin   • acetaminophen (TYLENOL) tablet 650 mg  650 mg Oral Q4H PRN Tejinder Kent MD        Or   •  acetaminophen (TYLENOL) suppository 650 mg  650 mg Rectal Q4H PRN Tejinder Kent MD       • brimonidine (ALPHAGAN) 0.2 % ophthalmic solution 1 drop  1 drop Left Eye TID Tejinder Kent MD   1 drop at 04/04/21 0812   • dextrose (D50W) 25 g/ 50mL Intravenous Solution 25 g  25 g Intravenous Q15 Min PRN Tejinder Kent MD       • dextrose (GLUTOSE) oral gel 15 g  15 g Oral Q15 Min PRN Tejinder Kent MD       • dorzolamide (TRUSOPT) 2 % ophthalmic solution 1 drop  1 drop Left Eye BID Tejinder Kent MD   1 drop at 04/04/21 0811   • fluticasone (FLONASE) 50 MCG/ACT nasal spray 2 spray  2 spray Nasal BID Tejinder Kent MD   2 spray at 04/04/21 0812   • glucagon (human recombinant) (GLUCAGEN DIAGNOSTIC) injection 1 mg  1 mg Subcutaneous Q15 Min PRN Tejinder Kent MD       • insulin lispro (humaLOG) injection 0-9 Units  0-9 Units Subcutaneous TID AC Tejinder Kent MD       • ipratropium-albuterol (DUO-NEB) nebulizer solution 3 mL  3 mL Nebulization Q6H PRN Tejinder Kent MD       • latanoprost (XALATAN) 0.005 % ophthalmic solution 1 drop  1 drop Left Eye Nightly Tejinedr Kent MD       • levothyroxine (SYNTHROID, LEVOTHROID) tablet 25 mcg  25 mcg Oral Q AM Tejinder Kent MD       • Morphine sulfate (PF) injection 0.5 mg  0.5 mg Intravenous Q4H PRN Jaswant Villegas MD       • nicotine (NICODERM CQ) 21 MG/24HR patch 1 patch  1 patch Transdermal Q AM Tejinder Kent MD   1 patch at 04/04/21 0407   • pantoprazole (PROTONIX) 40 mg in 100mL NS IVPB  8 mg/hr Intravenous Continuous Tejinder Kent MD 20 mL/hr at 04/04/21 0506 8 mg/hr at 04/04/21 0506   • pravastatin (PRAVACHOL) tablet 10 mg  10 mg Oral Nightly Tejinder Kent MD       • sertraline (ZOLOFT) tablet 25 mg  25 mg Oral Daily Tejinder Kent MD       • sevelamer (RENVELA) tablet 2,400 mg  2,400 mg Oral TID With Meals Tejinder Kent MD       • sodium chloride 0.9 % flush 10 mL  10 mL Intravenous PRN Donte  Tejinder WILHELM MD       • sodium chloride 0.9 % flush 10 mL  10 mL Intravenous PRN Tejinder Kent MD       • sodium chloride 0.9 % flush 10 mL  10 mL Intravenous Q12H Tejinder Kent MD   10 mL at 21 0812   • sodium chloride 0.9 % flush 10 mL  10 mL Intravenous PRN Tejinder Kent MD       • sodium chloride 0.9 % infusion  50 mL/hr Intravenous Continuous Tejinder Kent MD 50 mL/hr at 21 0516 50 mL/hr at 21 0516   • timolol (TIMOPTIC) 0.5 % ophthalmic solution 1 drop  1 drop Left Eye Q AM Tejinder Kent MD   1 drop at 21 0511       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    • Chronic otitis media    • Diabetes (CMS/[facility])    • End stage renal disease on dialysis (CMS/[facility])    • Eustachian tube dysfunction    • Glaucoma    • HTN (hypertension)    • Mucoid otitis media    • Noncompliance of patient with renal dialysis (CMS/[facility])    • Tobacco abuse        Past Surgical History:  Past Surgical History:   Procedure Laterality Date   • ARTERIOVENOUS FISTULA     • CATARACT EXTRACTION WITH INTRAOCULAR LENS IMPLANT     •  SECTION     • CHOLECYSTECTOMY     • MYRINGOTOMY W/ TUBES Bilateral    • MYRINGOTOMY W/ TUBES Right    • TUBAL ABDOMINAL LIGATION         Family History  Family History   Problem Relation Age of Onset   • Heart disease Mother    • Diabetes Father        Social History  Social History     Socioeconomic History   • Marital status: Single     Spouse name: Not on file   • Number of children: Not on file   • Years of education: Not on file   • Highest education level: Not on file   Tobacco Use   • Smoking status: Current Every Day Smoker     Packs/day: 2.00     Years: 6.00     Pack years: 12.00     Types: Cigarettes   • Smokeless tobacco: Never Used   Substance and Sexual Activity   • Alcohol use: No   • Drug use: No   • Sexual activity: Defer         Review of Systems:  Unable to obtain review of  "system as she is encephalopathic.  Objective:  BP 98/51   Pulse 71   Temp 96.9 °F (36.1 °C) (Axillary)   Resp 17   Ht 165.1 cm (65\")   Wt 67.2 kg (148 lb 2.4 oz)   SpO2 99%   BMI 24.65 kg/m²     Intake/Output Summary (Last 24 hours) at 4/4/2021 0954  Last data filed at 4/4/2021 0735  Gross per 24 hour   Intake 740 ml   Output --   Net 740 ml     General: Sleepy/confused  HEENT: Normocephalic atraumatic head  Neck: Supple with no JVD or carotid bruits.  Chest:  Decreased breath sound on both lung field.  CVS: regular rate and rhythm  Abdominal: soft, nontender, normal bowel sounds  Extremities: no cyanosis or edema  Skin: warm and dry without rash      Labs:    Results from last 7 days   Lab Units 04/04/21  0753 04/04/21  0247 04/03/21  2155   WBC 10*3/mm3  --  3.73 3.77   HEMOGLOBIN g/dL 10.0* 10.2* 9.7*   HEMATOCRIT % 31.1* 32.8* 30.3*   PLATELETS 10*3/mm3  --  162 177       Results from last 7 days   Lab Units 04/04/21  0247 04/03/21  2340   SODIUM mmol/L 137 136   POTASSIUM mmol/L 4.7 4.9   CHLORIDE mmol/L 98 96*   CO2 mmol/L 24.0 24.0   BUN mg/dL 47* 45*   CREATININE mg/dL 5.91* 5.52*   CALCIUM mg/dL 9.5 9.5   BILIRUBIN mg/dL 0.9 0.7   ALK PHOS U/L 229* 206*   ALT (SGPT) U/L 15 9   AST (SGOT) U/L 40* 26   GLUCOSE mg/dL 89 100*         Radiology:   Imaging Results (Last 24 Hours)     Procedure Component Value Units Date/Time    CT Head Without Contrast [[id number]] Collected: 04/04/21 0746     Updated: 04/04/21 0754    Narrative:      Exam: CT HEAD WO CONTRAST-     Indication: Altered mental status     Comparison: 3/26/2021     Dose length product: 685 mGy cm. Automated exposure control was also  utilized to decrease patient radiation dose.     Findings:     No acute intracranial hemorrhage. No loss of gray-white differentiation.  Chronic microvascular ischemic white matter change and global cerebral  volume loss. No midline shift or mass effect. LEFT globe prosthetic  device. No acute orbital finding. " Small amount of gas in the RIGHT  temporal soft tissues is likely venous related to IV placement. Mastoid  air cells and visualized paranasal sinuses are clear. No acute osseous  finding.       Impression:         1.  No acute intracranial findings.  2.  Global cerebral volume loss and presumed chronic microvascular  ischemic white matter change.  3.  RIGHT temporal soft tissue gas, likely related to IV placement.     These findings are in agreement with the critical and emergent findings  from the [facility] preliminary report.  This report was finalized on 04/04/2021 07:51 by Dr. Vasquez Villanueva MD.    XR Chest 1 View [[id number]] Collected: 04/04/21 0643     Updated: 04/04/21 0648    Narrative:      Exam: XR CHEST 1 VW-     Indication: Weak/Dizzy/AMS triage protocol     Comparison: 3/26/2021     Findings:     Cardiac silhouette is stable. No change in small to moderate RIGHT  pleural effusion with overlying atelectasis/consolidation. The LEFT lung  and pleural space appear clear. No visible pneumothorax. LEFT subclavian  stent noted. Redemonstrated RIGHT humeral surgical neck fracture with  involvement of the greater and lesser tuberosities.       Impression:      Impression:     No change in small to moderate RIGHT pleural effusion with overlying  atelectasis/consolidation.  This report was finalized on 04/04/2021 06:45 by Dr. Vasquez Villanueva MD.          Culture:  No components found for: WOUNDCUL, 3  No components found for: CSFCUL, 3  No components found for: BC, 3  No components found for: URINECUL, 3      Assessment   1.  End-stage renal disease on maintenance dialysis.  2.  Type II diabetic nephropathy.  3.  Noncompliant dialysis patient.  4.  Metabolic encephalopathy.  5.  Anemia of chronic kidney disease.  6.  Recent right humeral fracture.    Plan:  1.  Initiate dialysis treatment today for treatment of possible uremic encephalopathy.  2.  Continue to monitor neuro status.  3.  Resume ELVIS once  indicated      Thank you for the consult, we appreciate the opportunity to provide care to your patients.  Feel free to contact me if I can be of any further assistance.      Jose Caballero MD  4/4/2021  09:54 CDT

## 2021-04-04 NOTE — ED PROVIDER NOTES
Subjective   This patient is a 61-year-old female with a history of end-stage renal disease requiring dialysis.  Of note she does have a previous history of missing dialysis to the point of becoming uremic.  She was supposed to be dialyzed today according to her  but did not because she had a deterioration in her mental status.  She presents via EMS with significant alteration in her mental status where she is incoherent and unable to communicate with us.  The  cannot really add much more to the history but states that he could not get her to the dialysis today because of this change in her mental status.          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    • Chronic otitis media    • Diabetes (CMS/[facility])    • End stage renal disease on dialysis (CMS/[facility])    • Eustachian tube dysfunction    • Glaucoma    • HTN (hypertension)    • Mucoid otitis media    • Noncompliance of patient with renal dialysis (CMS/[facility])    • Tobacco abuse        Allergies   Allergen Reactions   • Bupropion Nausea Only   • Chantix [Varenicline]    • Sulfa Antibiotics    • Azithromycin Rash   • Levofloxacin Rash   • Penicillins Rash       Past Surgical History:   Procedure Laterality Date   • ARTERIOVENOUS FISTULA     • CATARACT EXTRACTION WITH INTRAOCULAR LENS IMPLANT     •  SECTION     • CHOLECYSTECTOMY     • MYRINGOTOMY W/ TUBES Bilateral    • MYRINGOTOMY W/ TUBES Right    • TUBAL ABDOMINAL LIGATION         Family History   Problem Relation Age of Onset   • Heart disease Mother    • Diabetes Father        Social History     Socioeconomic History   • Marital status: Single     Spouse name: Not on file   • Number of children: Not on file   • Years of education: Not on file   • Highest education level: Not on file   Tobacco Use   • Smoking status: Current Every Day Smoker     Packs/day: 2.00     Years: 6.00      Pack years: 12.00     Types: Cigarettes   • Smokeless tobacco: Never Used   Substance and Sexual Activity   • Alcohol use: No   • Drug use: No   • Sexual activity: Defer           Objective   Physical Exam  Vitals and nursing note reviewed.   Constitutional:       Appearance: She is well-developed. She is ill-appearing.   HENT:      Head: Normocephalic and atraumatic.      Right Ear: External ear normal.      Left Ear: External ear normal.      Nose: Nose normal.      Mouth/Throat:      Mouth: Mucous membranes are moist.      Pharynx: Oropharynx is clear.   Eyes:      Conjunctiva/sclera: Conjunctivae normal.   Cardiovascular:      Rate and Rhythm: Regular rhythm. Tachycardia present.      Heart sounds: Normal heart sounds.   Pulmonary:      Effort: Tachypnea present.      Breath sounds: Normal breath sounds.   Abdominal:      General: Bowel sounds are normal.      Palpations: Abdomen is soft.      Tenderness: There is abdominal tenderness.   Musculoskeletal:         General: Normal range of motion.      Cervical back: Normal range of motion and neck supple.   Skin:     General: Skin is warm and dry.      Capillary Refill: Capillary refill takes less than 2 seconds.   Neurological:      Mental Status: She is lethargic, disoriented and confused.   Psychiatric:         Attention and Perception: She is inattentive.         Mood and Affect: Affect is labile.         Speech: Speech is slurred.         Behavior: Behavior is combative.         Procedures           ED Course                                           MDM  Number of Diagnoses or Management Options     Amount and/or Complexity of Data Reviewed  Clinical lab tests: reviewed and ordered  Tests in the radiology section of CPT®: ordered and reviewed  Tests in the medicine section of CPT®: reviewed and ordered  Decide to obtain previous medical records or to obtain history from someone other than the patient: yes  Independent visualization of images, tracings,  or specimens: yes    Patient Progress  Patient progress: stable      Final diagnoses:   Altered mental status, unspecified altered mental status type   Uremia   Gastrointestinal hemorrhage, unspecified gastrointestinal hemorrhage type   Elevated troponin   Congestive heart failure, unspecified HF chronicity, unspecified heart failure type (CMS/[facility])       ED Disposition  ED Disposition     ED Disposition Condition Comment    Decision to Admit  Level of Care: Critical Care [6]   Diagnosis: Altered mental status, unspecified altered mental status type [[id number]]   Admitting Physician: SUE KENT [[id number]]   Attending Physician: SUE KENT [[id number]]   Certification: I Certify That Inpatient Hospital Services Are Medically Necessary For Greater Than 2 Midnights            No follow-up provider specified.       Medication List      No changes were made to your prescriptions during this visit.       The patient appears to be significantly uremic with confusion and worsened creatinine.  She also has a picture of fluid overloaded with a markedly increased BNP and a chest x-ray that suggest fluid overload.  Of note, she is dropped her hemoglobin 3 g in a week and has a positive Hemoccult on ANIYAH.  I ordered type and screen and IV Protonix for this.  The patient also has an elevated troponin which may be just a function of her elevated creatinine but this needs to be trended over time.  I discussed the case with Dr. Kent and he agrees that overall the patient needs to be in the unit because of her altered mental status and these abnormal labs and the GI bleed.     Benitez Farrell MD  04/04/21 0040

## 2021-04-05 NOTE — PLAN OF CARE
Goal Outcome Evaluation:  Plan of Care Reviewed With: patient  Progress: no change  Outcome Summary: Patient transfered from ICU today. Up in chair since arrival to floor. No c/o pain. Alert and oriented x 4. Plans for dialysis tomorrow. Continue to monitor.

## 2021-04-05 NOTE — PROGRESS NOTES
Discharge Planning Assessment  [facility]     Patient Name: Mini Gentile  MRN: [id number]  Today's Date: 4/5/2021    Admit Date: 4/3/2021    Discharge Needs Assessment     Row Name 04/05/21 0844       Living Environment    Lives With  spouse    Current Living Arrangements  home/apartment/condo    Primary Care Provided by  spouse/significant other    Provides Primary Care For  no one    Family Caregiver if Needed  child(devon), adult;spouse    Family Caregiver Names  Julio Cesar    Quality of Family Relationships  helpful;involved;supportive    Able to Return to Prior Arrangements  no       Resource/Environmental Concerns    Resource/Environmental Concerns  none       Transition Planning    Patient/Family Anticipates Transition to  inpatient rehabilitation facility    Patient/Family Anticipated Services at Transition  ;skilled nursing    Transportation Anticipated  family or friend will provide       Discharge Needs Assessment    Readmission Within the Last 30 Days  current reason for admission unrelated to previous admission    Equipment Currently Used at Home  walker, rolling    Concerns to be Addressed  discharge planning    Discharge Coordination/Progress  spoke to sonJulio Cesar; patient lives with spouse family is unable to care for patient; has RX coverage and dialysis [facility] and [facility]; patient is noncompliant with dialysis; family requesting SNF at discharge will follow for DC planning when ready for DC        Discharge Plan    No documentation.       Continued Care and Services - Admitted Since 4/3/2021    Coordination has not been started for this encounter.     Selected Continued Care - Prior Encounters Includes selections from prior encounters from 1/3/2021 to 4/5/2021    Discharged on 3/28/2021 Admission date: 3/26/2021 - Discharge disposition: Home-Health Care c    Home Medical Care     Service Provider Selected Services Address Phone Fax Patient Preferred    [facility]  [facility]  Home Health Services [address], [facility] [address] [phone] --                      Demographic Summary    No documentation.       Functional Status    No documentation.       Psychosocial    No documentation.       Abuse/Neglect    No documentation.       Legal    No documentation.       Substance Abuse    No documentation.       Patient Forms    No documentation.           Kary Mansfield RN

## 2021-04-05 NOTE — PLAN OF CARE
Goal Outcome Evaluation:  Plan of Care Reviewed With: patient  Progress: no change  Outcome Summary: PT eval complete. Pt fowlers, oriented to person, unable to provide correct time, situation, or place. Pt tearful, trying to self-feed upon entry, c/o inability to get food to her mouth. Rolling right & left, modA x2 + maxA for hygiene assist. after bowel movement. modA sidelying-sit x2. Pt able to sit EOB independently, scoot w/ verbal cueing. Pt continued being tearful & confused w/ situation. Pt unable to demonstrate hip flex AROM, all other BLE WFL. Sensation presents intact, but pt c/o tingling in B feet, unable to determine when it began. Pt modAx2 sit<>Stand, CGAx2 for sidetstepping to HOB & wheelchair. BLE strength functionally 4-/5 demoed in transfers. Continue PT services to improve strength & progress independence w/ transfers. Recommend d/c to SNF pending pt progress.

## 2021-04-05 NOTE — THERAPY EVALUATION
Patient Name: Mini Gentile  : 1960    MRN: [id number]                              Today's Date: 2021       Admit Date: 4/3/2021    Visit Dx:     ICD-10-CM ICD-9-CM   1. Altered mental status, unspecified altered mental status type  R41.82 780.97   2. Uremia  N19 586   3. Gastrointestinal hemorrhage, unspecified gastrointestinal hemorrhage type  K92.2 578.9   4. Elevated troponin  R77.8 790.6   5. Congestive heart failure, unspecified HF chronicity, unspecified heart failure type (CMS/Formerly [facility])  I50.9 428.0   6. Dysphagia, unspecified type  R13.10 787.20   7. Impaired mobility and ADLs  Z74.09 V49.89    Z78.9    8. 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   • Eustachian tube dysfunction   • Chronic otitis media   • Pneumonia of left upper lobe due to Streptococcus (CMS/Formerly [facility])   • End stage renal failure on dialysis (CMS/[facility]ly [facility])   • Diabetes (CMS/[facility]ly [facility])   • Glaucoma   • HTN (hypertension)   • Proteinuria   • Type 2 diabetes mellitus, with long-term current use of insulin (CMS/[facility]ly [facility])   • Anemia due to chronic kidney disease, on chronic dialysis (CMS/[facility]ly [facility])   • Acute pulmonary edema (CMS/[facility]ly [facility])   • Non-compliance with renal dialysis (CMS/[facility]ly [facility])   • Elevated troponin due to ESRD    • Hyperkalemia   • Encephalopathy, metabolic, due to uremia   • High anion gap metabolic acidosis due to uremia   • Respiratory distress   • Acute diastolic heart failure (CMS/[facility])   • Lymph node enlargement, left axillary measuring 1.1 cm -follow-up for primary care   • Nausea vomiting and diarrhea   • Acute diastolic heart failure (CMS/[facility])   • Diabetes mellitus with ophthalmic complication (CMS/[facility])   • Tobacco abuse   • Urinary tract infection with hematuria   • Pneumonia due to infectious organism   • Abnormal urine findings   • Closed fracture of right proximal humerus   • Hyponatremia   • Hypothyroidism (acquired)   • Forehead laceration secondary to  fall   • Heme positive stool     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    • Chronic otitis media    • Diabetes (CMS/HCC)    • End stage renal disease on dialysis (CMS/HCC)    • Eustachian tube dysfunction    • Glaucoma    • HTN (hypertension)    • Mucoid otitis media    • Noncompliance of patient with renal dialysis (CMS/[facility])    • Tobacco abuse      Past Surgical History:   Procedure Laterality Date   • ARTERIOVENOUS FISTULA     • CATARACT EXTRACTION WITH INTRAOCULAR LENS IMPLANT     •  SECTION     • CHOLECYSTECTOMY     • MYRINGOTOMY W/ TUBES Bilateral    • MYRINGOTOMY W/ TUBES Right    • TUBAL ABDOMINAL LIGATION       General Information     Row Name 21 1301          Physical Therapy Time and Intention    Document Type  evaluation CC: AMS. Dx: encephalopathy, uremia, gastrointestinal hemorrhage, CHF. Hx: fall (3/26) resulting R humeral neck fx, ESRD w/ dialysis, DM, glaucoma, chronic otitis media. MD: Mitzi  -MS (r) LW (t) MS (c)     Mode of Treatment  physical therapy  -MS (r) LW (t) MS (c)     Row Name 21 1301          General Information    Patient Profile Reviewed  yes  -MS (r) LW (t) MS (c)     Prior Level of Function  -- unable to obtain d/t mental status.  -MS (r) LW (t) MS (c)     Existing Precautions/Restrictions  fall;shoulder;non-weight bearing per ortho Dr. Springer on 3/28, pt is NWB R UE with sling  -MS (r) LW (t) MS (c)     Barriers to Rehab  medically complex;physical barrier  -MS (r) LW (t) MS (c)     Row Name 21 1309          Living Environment    Lives With  significant other  -MS (r) LW (t) MS (c)     Row Name 21 1309          Home Main Entrance    Number of Stairs, Main Entrance  -- unable to obtain d/t mental status.  -MS (r) LW (t) MS (c)     Row Name 21 1309          Stairs Within Home, Primary    Stair Railings, Within Home, Primary  -- unable to obtain d/t mental status.  -MS (r) LW (t) MS (c)      "Row Name 04/05/21 1309          Cognition    Orientation Status (Cognition)  oriented to;person Pt unable to declair where we are, & answered current month & year with \"jan. 2001\". Tearful when prompted the correct time.  -MS (r) LW (t) MS (c)     Row Name 04/05/21 1309          Safety Issues, Functional Mobility    Safety Issues Affecting Function (Mobility)  awareness of need for assistance;insight into deficits/self-awareness;judgment;problem-solving;safety precaution awareness;sequencing abilities;safety precautions follow-through/compliance  -MS (r) LW (t) MS (c)     Impairments Affecting Function (Mobility)  balance;cognition;pain;sensation/sensory awareness;strength;visual/perceptual  -MS (r) LW (t) MS (c)     Cognitive Impairments, Mobility Safety/Performance  awareness, need for assistance;insight into deficits/self-awareness;judgment;problem-solving/reasoning;safety precaution awareness;safety precaution follow-through  -MS (r) LW (t) MS (c)       User Key  (r) = Recorded By, (t) = Taken By, (c) = Cosigned By    Initials Name Provider Type    Maia Austin, PT, DPT, NCS Physical Therapist    Simin Badillo, FREDERICK Student PT Student        Mobility     Row Name 04/05/21 1309          Bed Mobility    Bed Mobility  rolling left;rolling right;scooting/bridging;sidelying-sit  -MS (r) LW (t) MS (c)     Rolling Left [place] (Bed Mobility)  moderate assist (50% patient effort);verbal cues;nonverbal cues (demo/gesture)  -MS (r) LW (t) MS (c)     Rolling Right [place] (Bed Mobility)  moderate assist (50% patient effort);verbal cues;nonverbal cues (demo/gesture)  -MS (r) LW (t) MS (c)     Scooting/Bridging [place] (Bed Mobility)  supervision;verbal cues;nonverbal cues (demo/gesture)  -MS (r) LW (t) MS (c)     Sidelying-Sit [place] (Bed Mobility)  moderate assist (50% patient effort);verbal cues;nonverbal cues (demo/gesture)  -MS (r) LW (t) MS (c)     Assistive Device (Bed Mobility)  bed " rails;head of bed elevated;draw sheet  -MS (r) LW (t) MS (c)     Comment (Bed Mobility)  Pt demonstrated difficulty following commands, c/o pain throughout all bed mobility & became tearful d/t requiring assistance.  -MS (r) LW (t) MS (c)     Row Name 04/05/21 1309          Bed-Chair Transfer    Bed-Chair [place] (Transfers)  contact guard;minimum assist (75% patient effort);verbal cues transferred to wheelchair  -MS (r) LW (t) MS (c)     Row Name 04/05/21 1309          Sit-Stand Transfer    Sit-Stand [place] (Transfers)  minimum assist (75% patient effort);moderate assist (50% patient effort);verbal cues  -MS (r) LW (t) MS (c)     Row Name 04/05/21 1309          Gait/Stairs (Locomotion)    [place] Level (Gait)  contact guard;verbal cues Pt required verbal cueing to side step towards HOB, took side steps to wheelchair for transfer.  -MS (r) LW (t) MS (c)     Distance in Feet (Gait)  sidestepping x10 steps to WC  -MS (r) LW (t) MS (c)     Deviations/Abnormal Patterns (Gait)  base of support, narrow;festinating/shuffling  -MS (r) LW (t) MS (c)     [place] Level (Stairs)  unable to assess  -MS (r) LW (t) MS (c)     Row Name 04/05/21 1309          Mobility    Extremity Weight-bearing Status  right upper extremity  -MS (r) LW (t) MS (c)     Right Upper Extremity (Weight-bearing Status)  non weight-bearing (NWB)  -MS (r) LW (t) MS (c)       User Key  (r) = Recorded By, (t) = Taken By, (c) = Cosigned By    Initials Name Provider Type    Maia Austin R, PT, DPT, NCS Physical Therapist    Simin Badillo, FREDERICK Student PT Student        Obj/Interventions     Row Name 04/05/21 1309          Range of Motion Comprehensive    General Range of Motion  bilateral lower extremity ROM WFL  -MS (r) LW (t) MS (c)     Row Name 04/05/21 1309          Strength Comprehensive (MMT)    General Manual Muscle Testing (MMT) Assessment  lower extremity strength deficits identified  -MS (r) LW (t) MS (c)     Comment, General  Manual Muscle Testing (MMT) Assessment  when cued, pt unable to perform hip flexion SEOB, but demonstrated 4-/5 BLE strength functionally during gait & transfers.  -MS (r) LW (t) MS (c)     Row Name 04/05/21 1309          Balance    Balance Assessment  sitting static balance;standing static balance  -MS (r) LW (t) MS (c)     Static Sitting Balance  WFL;sitting, edge of bed;unsupported  -MS (r) LW (t) MS (c)     Static Standing Balance  mild impairment;standing;supported post. lean, pt required verbal/tactile cues to correct.  -MS (r) LW (t) MS (c)     Row Name 04/05/21 1309          Sensory Assessment (Somatosensory)    Sensory Assessment (Somatosensory)  sensation intact pt reports intermittent tingling in BLE; can't remember when it began.  -MS (r) LW (t) MS (c)       User Key  (r) = Recorded By, (t) = Taken By, (c) = Cosigned By    Initials Name Provider Type    Maia Austin, PT, DPT, NCS Physical Therapist    Simin Badillo, PT Student PT Student        Goals/Plan     Row Name 04/05/21 130          Bed Mobility Goal 1 (PT)    Activity/Assistive Device (Bed Mobility Goal 1, PT)  bridging;rolling to left;rolling to right;scooting;sit to supine/supine to sit;sidelying to sit/sit to sidelying  -MS (r) LW (t) MS (c)     [place] Level/Cues Needed (Bed Mobility Goal 1, PT)  minimum assist (75% or more patient effort)  -MS (r) LW (t) MS (c)     Time Frame (Bed Mobility Goal 1, PT)  long term goal (LTG);10 days  -MS (r) LW (t) MS (c)     Progress/Outcomes (Bed Mobility Goal 1, PT)  goal ongoing  -MS (r) LW (t) MS (c)     Row Name 04/05/21 1303          Transfer Goal 1 (PT)    Activity/Assistive Device (Transfer Goal 1, PT)  sit-to-stand/stand-to-sit;bed-to-chair/chair-to-bed  -MS (r) LW (t) MS (c)     [place] Level/Cues Needed (Transfer Goal 1, PT)  minimum assist (75% or more patient effort)  -MS (r) LW (t) MS (c)     Time Frame (Transfer Goal 1, PT)  long term goal (LTG);10 days  -MS (r) LW (t) MS (c)      Progress/Outcome (Transfer Goal 1, PT)  goal ongoing  -MS (r) LW (t) MS (c)     Row Name 04/05/21 1303          Gait Training Goal 1 (PT)    Activity/Assistive Device (Gait Training Goal 1, PT)  gait (walking locomotion);decrease fall risk;improve balance and speed;increase endurance/gait distance;cane, straight;walker, lenore assist. device use pending most appropriate  -MS (r) LW (t) MS (c)     [place] Level (Gait Training Goal 1, PT)  supervision required  -MS (r) LW (t) MS (c)     Distance (Gait Training Goal 1, PT)  30ft  -MS (r) LW (t) MS (c)     Time Frame (Gait Training Goal 1, PT)  long term goal (LTG);10 days  -MS (r) LW (t) MS (c)     Progress/Outcome (Gait Training Goal 1, PT)  goal ongoing  -MS (r) LW (t) MS (c)     Row Name 04/05/21 1307          Patient Education Goal (PT)    Activity (Patient Education Goal, PT)  Pt able to don/doff sling independently.  -MS (r) LW (t) MS (c)     [place]/Cues/Accuracy (Memory Goal 2, PT)  demonstrates adequately  -MS (r) LW (t) MS (c)     Time Frame (Patient Education Goal, PT)  long term goal (LTG);10 days  -MS (r) LW (t) MS (c)     Progress/Outcome (Patient Education Goal, PT)  goal ongoing  -MS (r) LW (t) MS (c)       User Key  (r) = Recorded By, (t) = Taken By, (c) = Cosigned By    Initials Name Provider Type    Maia Austin, PT, DPT, NCS Physical Therapist    Simin Badillo, PT Student PT Student        Clinical Impression     Row Name 04/05/21 1304          Pain    Additional Documentation  Pain Scale: FACES Pre/Post-Treatment (Group)  -MS (r) LW (t) MS (c)     Row Name 04/05/21 1306          Pain Scale: Numbers Pre/Post-Treatment    Pain Intervention(s)  Ambulation/increased activity;Repositioned  -MS (r) LW (t) MS (c)     Row Name 04/05/21 1302          Pain Scale: FACES Pre/Post-Treatment    Pain: FACES Scale, Pretreatment  8-->hurts whole lot  -MS (r) LW (t) MS (c)     Posttreatment Pain Rating  4-->hurts little more  -MS (r) LW (t) MS  (c)     Pain Location - Side  Right  -MS (r) LW (t) MS (c)     Pain Location - Orientation  generalized  -MS (r) LW (t) MS (c)     Pain Location  shoulder  -MS (r) LW (t) MS (c)     Pre/Posttreatment Pain Comment  pt tearful throughout session, c/o R shoulder pain, reports feeling better after sling was adjusted by OT.  -MS (r) LW (t) MS (c)     Row Name 04/05/21 1306          Plan of Care Review    Plan of Care Reviewed With  patient  -MS (r) LW (t) MS (c)     Progress  no change  -MS (r) LW (t) MS (c)     Outcome Summary  PT eval complete. Pt fowlers, oriented to person, unable to provide correct time, situation, or place. Pt tearful, trying to self-feed upon entry, c/o inability to get food to her mouth. Rolling right & left, modA x2 + maxA for hygiene assist. after bowel movement. modA sidelying-sit x2. Pt able to sit EOB independently, scoot w/ verbal cueing. Pt continued being tearful & confused w/ situation. Pt unable to demonstrate hip flex AROM, all other BLE WFL. Sensation presents intact, but pt c/o tingling in B feet, unable to determine when it began. Pt modAx2 sit<>Stand, CGAx2 for sidetstepping to HOB & wheelchair. BLE strength functionally 4-/5 demoed in transfers. Continue PT services to improve strength & progress independence w/ transfers. Recommend d/c to SNF pending pt progress.  -MS (r) LW (t) MS (c)     Row Name 04/05/21 1305          Therapy Assessment/Plan (PT)    Patient/Family Therapy Goals Statement (PT)  go home  -MS (r) LW (t) MS (c)     Rehab Potential (PT)  fair, will monitor progress closely  -MS (r) LW (t) MS (c)     Criteria for Skilled Interventions Met (PT)  yes;skilled treatment is necessary  -MS (r) LW (t) MS (c)     Predicted Duration of Therapy Intervention (PT)  until d/c  -MS (r) LW (t) MS (c)     Row Name 04/05/21 1302          Vital Signs    Pre Patient Position  Supine  -MS (r) LW (t) MS (c)     Intra Patient Position  Standing  -MS (r) LW (t) MS (c)     Post Patient  Position  Sitting  -MS (r) LW (t) MS (c)     Row Name 04/05/21 1309          Positioning and Restraints    Pre-Treatment Position  in bed  -MS (r) LW (t) MS (c)     Post Treatment Position  wheelchair  -MS (r) LW (t) MS (c)     In Wheelchair  sitting;with brace;with nsg  -MS (r) LW (t) MS (c)       User Key  (r) = Recorded By, (t) = Taken By, (c) = Cosigned By    Initials Name Provider Type    Maia Austin, PT, DPT, NCS Physical Therapist    Simin Badillo, PT Student PT Student        Outcome Measures     Row Name 04/05/21 1309          How much help from another person do you currently need...    Turning from your back to your side while in flat bed without using bedrails?  2  -MS (r) LW (t) MS (c)     Moving from lying on back to sitting on the side of a flat bed without bedrails?  2  -MS (r) LW (t) MS (c)     Moving to and from a bed to a chair (including a wheelchair)?  3  -MS (r) LW (t) MS (c)     Standing up from a chair using your arms (e.g., wheelchair, bedside chair)?  2  -MS (r) LW (t) MS (c)     Climbing 3-5 steps with a railing?  2  -MS (r) LW (t) MS (c)     To walk in hospital room?  3  -MS (r) LW (t) MS (c)     AM-PAC 6 Clicks Score (PT)  14  -MS (r) LW (t)     Row Name 04/05/21 1309          Functional Assessment    Outcome Measure Options  AM-PAC 6 Clicks Basic Mobility (PT)  -MS (r) LW (t) MS (c)       User Key  (r) = Recorded By, (t) = Taken By, (c) = Cosigned By    Initials Name Provider Type    Maia Austin, PT, DPT, NCS Physical Therapist    Simin Badillo, PT Student PT Student        Physical Therapy Education                 Title: PT OT SLP Therapies (Not Started)     Topic: Physical Therapy (In Progress)     Point: Mobility training (Not Started)     Learner Progress:  Not documented in this visit.          Point: Home exercise program (Not Started)     Learner Progress:  Not documented in this visit.          Point: Body mechanics (Not Started)     Learner Progress:  Not  documented in this visit.          Point: Precautions (Done)     Learning Progress Summary           Patient Acceptance, E, VU,NR by LW at 4/5/2021 1309    Comment: Pt educated on WB precautions in RUE; demoed how to assist in bed mobility appropriately.                               User Key     Initials Effective Dates Name Provider Type Discipline     02/23/21 -  Simin Lr, PT Student PT Student PT              PT Recommendation and Plan  Planned Therapy Interventions (PT): balance training, bed mobility training, gait training, home exercise program, orthotic fitting/training, patient/family education, strengthening, transfer training  Plan of Care Reviewed With: patient  Progress: no change  Outcome Summary: PT eval complete. Pt fowlers, oriented to person, unable to provide correct time, situation, or place. Pt tearful, trying to self-feed upon entry, c/o inability to get food to her mouth. Rolling right & left, modA x2 + maxA for hygiene assist. after bowel movement. modA sidelying-sit x2. Pt able to sit EOB independently, scoot w/ verbal cueing. Pt continued being tearful & confused w/ situation. Pt unable to demonstrate hip flex AROM, all other BLE WFL. Sensation presents intact, but pt c/o tingling in B feet, unable to determine when it began. Pt modAx2 sit<>Stand, CGAx2 for sidetstepping to HOB & wheelchair. BLE strength functionally 4-/5 demoed in transfers. Continue PT services to improve strength & progress independence w/ transfers. Recommend d/c to SNF pending pt progress.     Time Calculation:   PT Charges     Row Name 04/05/21 1430             Time Calculation    Start Time  1309  -MS (r) LW (t) MS (c)      Stop Time  1401  -MS (r) LW (t) MS (c)      Time Calculation (min)  52 min  -MS (r) LW (t)      PT Received On  04/05/21  -MS (r) LW (t) MS (c)      PT Goal Re-Cert Due Date  04/15/21  -MS (r) LW (t) MS (c)        User Key  (r) = Recorded By, (t) = Taken By, (c) = Cosigned By    Initials  Name Provider Type    Maia Austin R, PT, DPT, NCS Physical Therapist    Simin Badillo, PT Student PT Student            PT G-Codes  Outcome Measure Options: AM-PAC 6 Clicks Basic Mobility (PT)  AM-PAC 6 Clicks Score (PT): 14  AM-PAC 6 Clicks Score (OT): 12    Simin Lr PT Student  4/5/2021

## 2021-04-05 NOTE — PROGRESS NOTES
PROGRESS NOTE.      Patient:  Mini Gentile  YOB: 1960  Date of Service: 4/5/2021  MRN: [id number]   Acct: [id number]   Primary Care Physician: Bharati Dahl APRN  Advance Directive:   Code Status and Medical Interventions:   Ordered at: 04/04/21 0213     Code Status:    CPR     Medical Interventions (Level of Support Prior to Arrest):    Full     Admit Date: 4/3/2021       Hospital Day: 1  Referring Provider: No ref. provider found      Patient Seen, Chart, Consults, Notes, Labs, Radiology studies reviewed.      Subjective:  Mini Gentile is a 61 y.o. female  whom we were consulted for end-stage renal disease.  She goes to [facility] on Tuesday Thursday Saturday.  Apparently she was not sure yesterday.  She has been noncompliant lately.  Presented as she was brought in by family as she was found to be confused, disoriented.  She has been on Percocet for pain control for right humeral neck fracture.  Patient has type 2 diabetes, hypertension, COPD, tobacco use and anemia of chronic kidney disease.  He was initially admitted to ICU. She was sleepy/confused/encephalopathy.  Patient received dialysis that was well-tolerated.  Patient has since moved to the medical floor.    Allergies:  Bupropion, Chantix [varenicline], Sulfa antibiotics, Azithromycin, Levofloxacin, and Penicillins    Home Meds:  Medications Prior to Admission   Medication Sig Dispense Refill Last Dose   • traMADol (ULTRAM) 50 MG tablet Take 50 mg by mouth Every 8 (Eight) Hours As Needed for Moderate Pain .      • albuterol sulfate  (90 Base) MCG/ACT inhaler Inhale 2 puffs Every 4 (Four) Hours As Needed for Wheezing. 18 g 0 Unknown at Unknown time   • aspirin 81 MG EC tablet Take 81 mg by mouth Daily.   Unknown at Unknown time   • atropine 1 % ophthalmic solution Administer 1 drop into the left eye Daily.   Unknown at Unknown time   • brimonidine (ALPHAGAN) 0.2 % ophthalmic solution Administer 1 drop into  the left eye 3 (Three) Times a Day.   Unknown at Unknown time   • carvedilol (COREG) 6.25 MG tablet Take 6.25 mg by mouth 2 (Two) Times a Day With Meals.   Unknown at Unknown time   • cholecalciferol (VITAMIN D3) 1000 units tablet Take 2,000 Units by mouth Daily.   Unknown at Unknown time   • dorzolamide (TRUSOPT) 2 % ophthalmic solution Administer 1 drop into the left eye 2 (Two) Times a Day.   Unknown at Unknown time   • famotidine (PEPCID) 20 MG tablet Take 1 tablet by mouth 2 (Two) Times a Day. 60 tablet 2 Unknown at Unknown time   • fluticasone (FLONASE) 50 MCG/ACT nasal spray 2 sprays into the nostril(s) as directed by provider 2 (Two) Times a Day.   Unknown at Unknown time   • insulin glargine (LANTUS) 100 UNIT/ML injection Inject 10 Units under the skin Every Night.   Unknown at Unknown time   • ipratropium (ATROVENT) 0.02 % nebulizer solution Take 500 mcg by nebulization Every 4 (Four) Hours As Needed for Wheezing or Shortness of Air.   Unknown at Unknown time   • lactobacillus acidophilus (RISAQUAD) capsule capsule Take 1 capsule by mouth Daily. 30 capsule 2 Unknown at Unknown time   • Latanoprostene Bunod (Vyzulta) 0.024 % solution Administer 1 drop into the left eye Every Night. Left eye    Unknown at Unknown time   • levothyroxine (SYNTHROID, LEVOTHROID) 25 MCG tablet Take 1 tablet by mouth Daily. 30 tablet 0 Unknown at Unknown time   • losartan (COZAAR) 50 MG tablet Take 50 mg by mouth 2 (Two) Times a Day.   Unknown at Unknown time   • Netarsudil Dimesylate (RHOPRESSA) 0.02 % solution Administer 1 drop into the left eye Every Night.   Unknown at Unknown time   • nicotine (NICODERM CQ) 21 MG/24HR patch Place 1 patch on the skin as directed by provider Daily. 30 patch 2 Unknown at Unknown time   • ondansetron (ZOFRAN) 4 MG tablet Take 1 tablet by mouth Every 6 (Six) Hours As Needed for Nausea or Vomiting. 24 tablet 2 Unknown at Unknown time   • oxyCODONE-acetaminophen (PERCOCET) 7.5-325 MG per tablet  Take 1 tablet by mouth Every 6 (Six) Hours As Needed for Severe Pain . 12 tablet 0 Unknown at Unknown time   • pravastatin (PRAVACHOL) 10 MG tablet Take 10 mg by mouth Every Night.   Unknown at Unknown time   • pregabalin (LYRICA) 50 MG capsule Take 50 mg by mouth 2 (Two) Times a Day.   Unknown at Unknown time   • sertraline (ZOLOFT) 25 MG tablet Take 25 mg by mouth Daily.   Unknown at Unknown time   • sevelamer (RENAGEL) 800 MG tablet Take 2,400 mg by mouth 3 (Three) Times a Day With Meals.   Unknown at Unknown time   • timolol (TIMOPTIC) 0.5 % ophthalmic solution Administer 1 drop into the left eye Every Morning.          Medicines:  Current Facility-Administered Medications   Medication Dose Route Frequency Provider Last Rate Last Admin   • acetaminophen (TYLENOL) tablet 650 mg  650 mg Oral Q4H PRN Fuentes Cartagena DO        Or   • acetaminophen (TYLENOL) suppository 650 mg  650 mg Rectal Q4H PRN Fuentes Cartagena DO       • brimonidine (ALPHAGAN) 0.2 % ophthalmic solution 1 drop  1 drop Left Eye TID Fuentes Cartagena DO   1 drop at 04/05/21 0938   • dextrose (D50W) 25 g/ 50mL Intravenous Solution 25 g  25 g Intravenous Q15 Min PRN Fuentes Cartagena DO   25 g at 04/04/21 1106   • dextrose (GLUTOSE) oral gel 15 g  15 g Oral Q15 Min PRN Fuentes Cartagena DO       • dorzolamide (TRUSOPT) 2 % ophthalmic solution 1 drop  1 drop Left Eye BID Fuentes Cartagena DO   1 drop at 04/04/21 2002   • fluticasone (FLONASE) 50 MCG/ACT nasal spray 2 spray  2 spray Nasal BID Fuentes Cartagena DO   2 spray at 04/05/21 0939   • glucagon (human recombinant) (GLUCAGEN DIAGNOSTIC) injection 1 mg  1 mg Subcutaneous Q15 Min PRN Fuentes Cartagena DO       • insulin lispro (humaLOG) injection 0-9 Units  0-9 Units Subcutaneous TID AC Fuentes Cartagena DO       • ipratropium-albuterol (DUO-NEB) nebulizer solution 3 mL  3 mL Nebulization Q6H PRN Fuentes Cartagena DO       • latanoprost (XALATAN) 0.005 % ophthalmic solution 1 drop  1 drop Left Eye  Nightly Fuentes Cartagena DO   1 drop at 21   • levothyroxine (SYNTHROID, LEVOTHROID) tablet 25 mcg  25 mcg Oral Q AM Fuentes Cartagena DO       • Morphine sulfate (PF) injection 0.5 mg  0.5 mg Intravenous Q4H PRN Fuentes Cartagena DO   0.5 mg at 21 0043   • nicotine (NICODERM CQ) 21 MG/24HR patch 1 patch  1 patch Transdermal Q AM Fuentes Cartagena DO   1 patch at 21 0619   • [START ON 2021] pantoprazole (PROTONIX) EC tablet 40 mg  40 mg Oral Q AM Fuentes Cartagena DO       • pravastatin (PRAVACHOL) tablet 10 mg  10 mg Oral Nightly Fuentes Cartagena DO       • sertraline (ZOLOFT) tablet 25 mg  25 mg Oral Daily Fuentes Cartagena DO   25 mg at 21 0938   • sevelamer (RENVELA) tablet 2,400 mg  2,400 mg Oral TID With Meals Fuentes Cartagena DO   2,400 mg at 21 0948   • sodium chloride 0.9 % flush 10 mL  10 mL Intravenous Q12H Fuentes Cartagena DO   10 mL at 21 0938   • sodium chloride 0.9 % flush 10 mL  10 mL Intravenous PRN Fuentes Cartagena DO       • timolol (TIMOPTIC) 0.5 % ophthalmic solution 1 drop  1 drop Left Eye Q AM Fuentes Cartagena DO   1 drop at 21 0619       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    • Chronic otitis media    • Diabetes (CMS/[facility])    • End stage renal disease on dialysis (CMS/[facility])    • Eustachian tube dysfunction    • Glaucoma    • HTN (hypertension)    • Mucoid otitis media    • Noncompliance of patient with renal dialysis (CMS/[facility])    • Tobacco abuse        Past Surgical History:  Past Surgical History:   Procedure Laterality Date   • ARTERIOVENOUS FISTULA     • CATARACT EXTRACTION WITH INTRAOCULAR LENS IMPLANT     •  SECTION     • CHOLECYSTECTOMY     • MYRINGOTOMY W/ TUBES Bilateral    • MYRINGOTOMY W/ TUBES Right    • TUBAL ABDOMINAL LIGATION         Family History  Family History   Problem Relation Age of Onset   • Heart disease Mother    • Diabetes Father   "      Social History  Social History     Socioeconomic History   • Marital status: Single     Spouse name: Not on file   • Number of children: Not on file   • Years of education: Not on file   • Highest education level: Not on file   Tobacco Use   • Smoking status: Current Every Day Smoker     Packs/day: 2.00     Years: 6.00     Pack years: 12.00     Types: Cigarettes   • Smokeless tobacco: Never Used   Substance and Sexual Activity   • Alcohol use: No   • Drug use: No   • Sexual activity: Defer       Review of Systems:  History obtained from chart review and the patient  General ROS: No fever or chills  Respiratory ROS: No cough, +shortness of breath,- wheezing  Cardiovascular ROS: no chest pain but dyspnea on exertion  Gastrointestinal ROS: No abdominal pain or melena  Genito-Urinary ROS: No dysuria or hematuria  Musculoskeletal: negative  Skin: negative    Objective:  /50 (BP Location: Right leg, Patient Position: Sitting)   Pulse 70   Temp 98.3 °F (36.8 °C) (Oral)   Resp 18   Ht 165.1 cm (65\")   Wt 59 kg (130 lb)   SpO2 100%   BMI 21.63 kg/m²     Intake/Output Summary (Last 24 hours) at 4/5/2021 1628  Last data filed at 4/4/2021 2000  Gross per 24 hour   Intake 232 ml   Output --   Net 232 ml       Physical examination:  General: awake/alert   Chest:eased breath sound on both lung field.  CVS: regular rate and rhythm  Abdominal: soft, nontender, normal bowel sounds  Extremities: no cyanosis or edema  Skin: warm and dry without rash  Neuro: No focal motor deficits    Labs:  Lab Results (last 24 hours)     Procedure Component Value Units Date/Time    POC Glucose Once [[id number]]  (Normal) Collected: 04/05/21 1051    Specimen: Blood Updated: 04/05/21 1106     Glucose 104 mg/dL      Comment: : [id number] Blake Walkertoro ID: [id number]       POC Glucose Once [[id number]]  (Normal) Collected: 04/05/21 0716    Specimen: Blood Updated: 04/05/21 0720     Glucose 76 mg/dL      Comment: : " [id number] Blake Otto ID: [id number]       Hemoglobin & Hematocrit, Blood [[id number]]  (Abnormal) Collected: 04/05/21 0703    Specimen: Blood Updated: 04/05/21 0718     Hemoglobin 9.6 g/dL      Hematocrit 30.6 %     Comprehensive Metabolic Panel [[id number]]  (Abnormal) Collected: 04/05/21 0027    Specimen: Blood Updated: 04/05/21 0105     Glucose 96 mg/dL      BUN 24 mg/dL      Creatinine 3.72 mg/dL      Sodium 137 mmol/L      Potassium 4.3 mmol/L      Chloride 97 mmol/L      CO2 21.0 mmol/L      Calcium 9.0 mg/dL      Total Protein 6.3 g/dL      Albumin 3.30 g/dL      ALT (SGPT) 13 U/L      AST (SGOT) 27 U/L      Alkaline Phosphatase 212 U/L      Total Bilirubin 1.1 mg/dL      eGFR Non African Amer 12 mL/min/1.73      Comment: <15 Indicative of kidney failure.        eGFR   Amer --     Comment: <15 Indicative of kidney failure.        Globulin 3.0 gm/dL      A/G Ratio 1.1 g/dL      BUN/Creatinine Ratio 6.5     Anion Gap 19.0 mmol/L     Narrative:      GFR Normal >60  Chronic Kidney Disease <60  Kidney Failure <15      CBC (No Diff) [[id number]]  (Abnormal) Collected: 04/05/21 0027    Specimen: Blood Updated: 04/05/21 0038     WBC 3.24 10*3/mm3      RBC 3.24 10*6/mm3      Hemoglobin 9.9 g/dL      Hematocrit 31.5 %      MCV 97.2 fL      MCH 30.6 pg      MCHC 31.4 g/dL      RDW 16.2 %      RDW-SD 58.0 fl      MPV 9.1 fL      Platelets 155 10*3/mm3           Radiology:   Imaging Results (Last 24 Hours)     Procedure Component Value Units Date/Time    SCANNED - IMAGING [[id number]] Resulted: 04/03/21     Updated: 04/05/21 0237              Assessment   1.  End-stage renal disease on maintenance dialysis.  2.  Type II diabetic nephropathy.  3.  Noncompliant dialysis patient.  4.  Metabolic encephalopathy.  5.  Anemia of chronic kidney disease.  6.  Recent right humeral fracture.    Plan:  We will provide more dialysis in a.m. and restart her on the Tuesday Thursday Saturday schedule.  Follow-up labs.  Patient  was encouraged to be more compliant.    Ten Boyd MD  4/5/2021  16:28 CDT

## 2021-04-05 NOTE — PLAN OF CARE
Goal Outcome Evaluation:  Plan of Care Reviewed With: (P) patient  Progress: (P)  (initial evaluation)    Swallowing evaluation completed. Patient was alert, cooperative, and oriented x3. Patient was on a diet of clear liquids per GI. However, Dr. Cartagena wanted patient evaluated for a regular diet. During oral [facility] exam, oral motor movement, range of motion, and structure were WFL. Consistencies given included pureed, honey thick, nectar thick, thin, and regular consistencies. Patient exhibited no signs or symptoms of aspiration during trials. Patient reported that she eats whatever she wants at home and drinks thin liquids. She also reported that she normally takes her medicine whole with thin liquids. SLP recommends [facility] soft diet/thin liquids. [facility] soft food was recommended since patient's dentures may not be available. Medicine can be taken whole with thin liquid. SLP will follow up one time to ensure diet tolerance.

## 2021-04-05 NOTE — CONSULTS
Inpatient Gastroenterology Service    Consultation    Mustapha Carter MD, FACP        Patient referred for evaluation of (+) FOB  History is obtained fromthe EMR, the nursing staf        Chief complaint  -no GI complaint at the time of my visit        History of Present Illness  hemodialysis patient missed HD and developed mental status changes.  Found to have (+)FOB    -no report of hematemesis, hematochezia, melena, nausea, emesis, pyrosis, regurgitation, dysphagia, odynophagia, altered bowel habit, change in stool, choluria, acholic stool, or jaundice        Past Medical History  -DM  -ESRD/HD  -glaucoma        Past Surgical History  -TL  -ECCE + IOL  -reilly  -c-sec  -TM tubes        Past Endoscopy History  -unknown        Family History  -NC:  no report of family history of: gastric cancer, pancreatic cancer, colorectal cancer, breast cancer, ovarian cancer, uterine cancer, colorectal polyps, inflammatory bowel disease, Crohn's disease, ulcerative colitis, peptic ulcer disease, pancreatitis, hepatitis, cirrhosis        Social History  -single  -tobacco use: 2 ppd        Medications  -see EMR lists        Physical Exam  General  -appears greater than stated age  -moderate distress    HEENT  -normocephalic  -atraumatic  -no drainage  -no bleeding  -mucosa appears moist     Neurological  -somnulent  -not oriented  -slowed response  -no tremor  -no obvious focal deficit  -moves all extremities without obvious abnormality    Psychiatric  -disoriented    Skin (evaluation limited to exposed skin)  -dry  -no diaphoresis  -no icterus  -no rash     Neck  -supple  -normal passive ROM  -no JVD    Pulmonary  -normal respiratory effort  -no respiratory distress  -no stridor    Cardiovascular  -normal rate  -regular rhythm    Abdomen  -benign  -soft        Laboratory of Note  -Hb has varied with volume changes: 9.7>>>10.2>>>10.0>>>8.5  -BUN 47, Cr 5.91, ratio 8          Assessment  -(+) FOB without overt GI hemorrhage in  this patient with chronic anemia/ESRD          Recommendations  -IV PPi  -follow H&H, transfuse as clinically appropriate  -type and hold at least two units to be rapidly available should brisk bleeding develop  -clear liquid diet  -no plan for EGD at present  -avoid agents toxic to GI tract mucosa, anticoagulant/antiplatelet agents, as clinically practicable  -elective GI evaluation as an outpatient post-convalescence          Thank you for allowing us to participate in the care of this patient.    Sincerely,    REED Carter MD, FACP

## 2021-04-05 NOTE — THERAPY EVALUATION
Patient Name: Mini Gentile  : 1960    MRN: [id number]                              Today's Date: 2021       Admit Date: 4/3/2021    Visit Dx:     ICD-10-CM ICD-9-CM   1. Altered mental status, unspecified altered mental status type  R41.82 780.97   2. Uremia  N19 586   3. Gastrointestinal hemorrhage, unspecified gastrointestinal hemorrhage type  K92.2 578.9   4. Elevated troponin  R77.8 790.6   5. Congestive heart failure, unspecified HF chronicity, unspecified heart failure type (CMS/[facility])  I50.9 428.0   6. Dysphagia, unspecified type  R13.10 787.20   7. 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   • Eustachian tube dysfunction   • Chronic otitis media   • Pneumonia of left upper lobe due to Streptococcus (CMS/[facility])   • End stage renal failure on dialysis (CMS/[facility])   • Diabetes (CMS/[facility])   • Glaucoma   • HTN (hypertension)   • Proteinuria   • Type 2 diabetes mellitus, with long-term current use of insulin (CMS/[facility])   • Anemia due to chronic kidney disease, on chronic dialysis (CMS/[facility])   • Acute pulmonary edema (CMS/[facility])   • Non-compliance with renal dialysis (CMS/[facility])   • Elevated troponin due to ESRD    • Hyperkalemia   • Encephalopathy, metabolic, due to uremia   • High anion gap metabolic acidosis due to uremia   • Respiratory distress   • Acute diastolic heart failure (CMS/[facility])   • Lymph node enlargement, left axillary measuring 1.1 cm -follow-up for primary care   • Nausea vomiting and diarrhea   • Acute diastolic heart failure (CMS/[facility])   • Diabetes mellitus with ophthalmic complication (CMS/[facility])   • Tobacco abuse   • Urinary tract infection with hematuria   • Pneumonia due to infectious organism   • Abnormal urine findings   • Closed fracture of right proximal humerus   • Hyponatremia   • Hypothyroidism (acquired)   • Forehead laceration secondary to fall   • Heme positive stool     Past  Medical History:   Diagnosis Date   • 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)    • End stage renal disease on dialysis (CMS/[facility])    • Eustachian tube dysfunction    • Glaucoma    • HTN (hypertension)    • Mucoid otitis media    • Noncompliance of patient with renal dialysis (CMS/[facility])    • Tobacco abuse      Past Surgical History:   Procedure Laterality Date   • ARTERIOVENOUS FISTULA     • CATARACT EXTRACTION WITH INTRAOCULAR LENS IMPLANT     •  SECTION     • CHOLECYSTECTOMY     • MYRINGOTOMY W/ TUBES Bilateral    • MYRINGOTOMY W/ TUBES Right    • TUBAL ABDOMINAL LIGATION       General Information     Row Name 21 1305          OT Time and Intention    Document Type  evaluation Pt presented with AMS. Pt recently d/c'd from facility s/p R humeral neck fx on 3/28. Pt has a hx of ESRD, DM, glaucoma. Dx: uremia, metabolic encephalopathy.  -J     Mode of Treatment  occupational therapy  -     Row Name 21 1300          General Information    Prior Level of Function  independent:;all household mobility;community mobility;transfer;bed mobility;ADL's pt reports that prior to humeral fx, she was independent for all adls  -J     Existing Precautions/Restrictions  fall;shoulder;non-weight bearing per ortho Dr. Springer on 3/28, pt is NWB R UE with sling  -JJ     Barriers to Rehab  physical barrier;medically complex  -J     Row Name 21 1305          Living Environment    Lives With  significant other  -     Row Name 21 1305          Home Main Entrance    Number of Stairs, Main Entrance  -- unsure d/t cogn status  -JJ     Row Name 21 1305          Cognition    Orientation Status (Cognition)  oriented to;person  -     Row Name 21 1305          Safety Issues, Functional Mobility    Impairments Affecting Function (Mobility)  balance;cognition;endurance/activity tolerance;pain;strength  -J     Cognitive Impairments, Mobility  Safety/Performance  awareness, need for assistance;insight into deficits/self-awareness;safety precaution awareness;safety precaution follow-through  -       User Key  (r) = Recorded By, (t) = Taken By, (c) = Cosigned By    Initials Name Provider Type    Kami Garduno OTR/L Occupational Therapist          Mobility/ADL's     Row Name 04/05/21 1305          Bed Mobility    Bed Mobility  supine-sit;rolling right  -     Rolling Right [place] (Bed Mobility)  moderate assist (50% patient effort);verbal cues  -     Supine-Sit [place] (Bed Mobility)  moderate assist (50% patient effort);verbal cues  -     Bed Mobility, Safety Issues  decreased use of arms for pushing/pulling  -     Assistive Device (Bed Mobility)  head of bed elevated;bed rails  -     Row Name 04/05/21 1305          Transfers    Transfers  sit-stand transfer  -     Sit-Stand [place] (Transfers)  contact guard;verbal cues;minimum assist (75% patient effort)  -     Row Name 04/05/21 1305          Functional Mobility    Functional Mobility- Ind. Level  contact guard assist;verbal cues required  -     Functional Mobility- Comment  able to take small steps from bed to w/c.  -     Row Name 04/05/21 1305          Activities of Daily Living    BADL Assessment/Intervention  upper body dressing;toileting  -     Row Name 04/05/21 1305          Mobility    Extremity Weight-bearing Status  right upper extremity  -     Right Upper Extremity (Weight-bearing Status)  non weight-bearing (NWB)  -     Row Name 04/05/21 1305          Upper Body Dressing Assessment/Training    [place] Level (Upper Body Dressing)  don;maximum assist (25% patient effort)  -     Position (Upper Body Dressing)  edge of bed sitting  -     Comment (Upper Body Dressing)  R UE sling  -     Row Name 04/05/21 1305          Toileting Assessment/Training    [place] Level (Toileting)  change pad/brief;adjust/manage clothing;perform perineal  hygiene;maximum assist (25% patient effort)  -JJ     Assistive Devices (Toileting)  bedpan  -JJ     Position (Toileting)  supine  -J       User Key  (r) = Recorded By, (t) = Taken By, (c) = Cosigned By    Initials Name Provider Type    Kami Garduno OTR/L Occupational Therapist        Obj/Interventions     Row Name 04/05/21 1305          Sensory Assessment (Somatosensory)    Sensory Assessment (Somatosensory)  UE sensation intact  -     Row Name 04/05/21 1305          Vision Assessment/Intervention    Visual Impairment/Limitations  WFL  -     Row Name 04/05/21 1305          Range of Motion Comprehensive    Comment, General Range of Motion  L UE AROM WFL, R wrist and digits AROM WFL, elbow and shoulder n/a d/t fx.  -     Row Name 04/05/21 1305          Strength Comprehensive (MMT)    Comment, General Manual Muscle Testing (MMT) Assessment  L UE strength 4/5, R UE strength n/a d/t fx  -     Row Name 04/05/21 1305          Balance    Balance Assessment  sitting static balance;standing static balance  -J     Static Sitting Balance  WFL;unsupported;sitting, edge of bed  -JJ     Static Standing Balance  mild impairment;supported;standing  -       User Key  (r) = Recorded By, (t) = Taken By, (c) = Cosigned By    Initials Name Provider Type    Kami Garduno OTR/L Occupational Therapist        Goals/Plan     Row Name 04/05/21 1305          Bathing Goal 1 (OT)    Activity/Device (Bathing Goal 1, OT)  bathing skills, all  -JJ     [place] Level/Cues Needed (Bathing Goal 1, OT)  minimum assist (75% or more patient effort) AE PRN  -JJ     Time Frame (Bathing Goal 1, OT)  long term goal (LTG);by discharge  -JJ     Progress/Outcomes (Bathing Goal 1, OT)  goal ongoing  -     Row Name 04/05/21 1305          Dressing Goal 1 (OT)    Activity/Device (Dressing Goal 1, OT)  upper body dressing  -JJ     [place]/Cues Needed (Dressing Goal 1, OT)  contact guard assist;verbal cues required  -J      Time Frame (Dressing Goal 1, OT)  long term goal (LTG);by discharge  -JJ     Progress/Outcome (Dressing Goal 1, OT)  goal ongoing  -     Row Name 04/05/21 1301          Toileting Goal 1 (OT)    Activity/Device (Toileting Goal 1, OT)  toileting skills, all  -JJ     [place] Level/Cues Needed (Toileting Goal 1, OT)  minimum assist (75% or more patient effort)  -JJ     Time Frame (Toileting Goal 1, OT)  long term goal (LTG);by discharge  -JJ     Progress/Outcome (Toileting Goal 1, OT)  goal ongoing  -     Row Name 04/05/21 1304          Therapy Assessment/Plan (OT)    Planned Therapy Interventions (OT)  activity tolerance training;adaptive equipment training;BADL retraining;functional balance retraining;occupation/activity based interventions;orthotic fabrication/fitting/training;patient/caregiver education/training;transfer/mobility retraining;strengthening exercise  -       User Key  (r) = Recorded By, (t) = Taken By, (c) = Cosigned By    Initials Name Provider Type    Kami Garduno OTR/L Occupational Therapist        Clinical Impression     Row Name 04/05/21 1307          Pain Assessment    Additional Documentation  Pain Scale: FACES Pre/Post-Treatment (Group)  -     Row Name 04/05/21 6312          Pain Scale: Numbers Pre/Post-Treatment    Pain Intervention(s)  Medication (See MAR);Ambulation/increased activity;Repositioned  -     Row Name 04/05/21 1308          Pain Scale: FACES Pre/Post-Treatment    Pain: FACES Scale, Pretreatment  2-->hurts little bit  -JJ     Posttreatment Pain Rating  4-->hurts little more  -JJ     Pain Location - Side  Right  -JJ     Pain Location - Orientation  upper  -JJ     Pain Location  extremity  -     Row Name 04/05/21 1306          Plan of Care Review    Plan of Care Reviewed With  patient  -JJ     Outcome Summary  OT eval completed. Pt is alert but oriented to self only. She is able to follow approx 75% of one step commands with vcs. Pt with R humeral neck fx  and is NWB RUE with sling. She required re-education of NWB status and use of sling. Max A to don sling. Pt with incontient episode and required Max A for dee care and changing brief. Mod A for rolling left and supine to sit at EOB. Max A for LBD seated at EOB. CGA/Min A for sit <> stand t/f from EOB and small steps from bed to w/c. She would benefit from skilled OT services to address these deficits. Recommend d/c to SNF vs. home with 24/7 care for continued skilled therapy services.  -J     Row Name 04/05/21 1309          Therapy Assessment/Plan (OT)    Patient/Family Therapy Goal Statement (OT)  decreased pain.  -J     Rehab Potential (OT)  good, to achieve stated therapy goals  -     Criteria for Skilled Therapeutic Interventions Met (OT)  yes;skilled treatment is necessary  -     Therapy Frequency (OT)  5 times/wk  -     Predicted Duration of Therapy Intervention (OT)  10 days  -J     Row Name 04/05/21 1303          Therapy Plan Review/Discharge Plan (OT)    Anticipated Discharge Disposition (OT)  skilled nursing facility;home with 24/7 care  -J     Row Name 04/05/21 1305          Vital Signs    Pre Patient Position  Supine  -JJ     Intra Patient Position  Standing  -JJ     Post Patient Position  Sitting  -JJ     Row Name 04/05/21 1305          Positioning and Restraints    Pre-Treatment Position  in bed  -JJ     Post Treatment Position  wheelchair  -JJ     In Wheelchair  sitting;with nsg;with brace  -JJ       User Key  (r) = Recorded By, (t) = Taken By, (c) = Cosigned By    Initials Name Provider Type    Kami Garduno, OTR/L Occupational Therapist        Outcome Measures     Row Name 04/05/21 3165          How much help from another is currently needed...    Putting on and taking off regular lower body clothing?  2  -JJ     Bathing (including washing, rinsing, and drying)  2  -JJ     Toileting (which includes using toilet bed pan or urinal)  2  -JJ     Putting on and taking off regular upper  body clothing  2  -JJ     Taking care of personal grooming (such as brushing teeth)  2  -JJ     Eating meals  2  -JJ     AM-PAC 6 Clicks Score (OT)  12  -     Row Name 04/05/21 1305          Functional Assessment    Outcome Measure Options  AM-PAC 6 Clicks Daily Activity (OT)  -       User Key  (r) = Recorded By, (t) = Taken By, (c) = Cosigned By    Initials Name Provider Type     Kami Chapman OTR/L Occupational Therapist        Occupational Therapy Education                 Title: PT OT SLP Therapies (Not Started)     Topic: Occupational Therapy (In Progress)     Point: ADL training (In Progress)     Description:   Instruct learner(s) on proper safety adaptation and remediation techniques during self care or transfers.   Instruct in proper use of assistive devices.              Learning Progress Summary           Patient Acceptance, E, NR by MICHAEL at 4/5/2021 1422                   Point: Home exercise program (Not Started)     Description:   Instruct learner(s) on appropriate technique for monitoring, assisting and/or progressing therapeutic exercises/activities.              Learner Progress:  Not documented in this visit.          Point: Precautions (In Progress)     Description:   Instruct learner(s) on prescribed precautions during self-care and functional transfers.              Learning Progress Summary           Patient Acceptance, E, NR by MICHAEL at 4/5/2021 1422                   Point: Body mechanics (In Progress)     Description:   Instruct learner(s) on proper positioning and spine alignment during self-care, functional mobility activities and/or exercises.              Learning Progress Summary           Patient Acceptance, E, NR by  at 4/5/2021 1422                               User Key     Initials Effective Dates Name Provider Type Discipline     12/04/20 -  Kami Chapman OTR/L Occupational Therapist OT              OT Recommendation and Plan  Planned Therapy Interventions (OT):  activity tolerance training, adaptive equipment training, BADL retraining, functional balance retraining, occupation/activity based interventions, orthotic fabrication/fitting/training, patient/caregiver education/training, transfer/mobility retraining, strengthening exercise  Therapy Frequency (OT): 5 times/wk  Plan of Care Review  Plan of Care Reviewed With: patient  Outcome Summary: OT eval completed. Pt is alert but oriented to self only. She is able to follow approx 75% of one step commands with vcs. Pt with R humeral neck fx and is NWB RUE with sling. She required re-education of NWB status and use of sling. Max A to don sling. Pt with incontient episode and required Max A for dee care and changing brief. Mod A for rolling left and supine to sit at EOB. Max A for LBD seated at EOB. CGA/Min A for sit <> stand t/f from EOB and small steps from bed to w/c. She would benefit from skilled OT services to address these deficits. Recommend d/c to SNF vs. home with 24/7 care for continued skilled therapy services.     Time Calculation:   Time Calculation- OT     Row Name 04/05/21 1422             Time Calculation- OT    OT Start Time  1305  -      OT Stop Time  1404  -      OT Time Calculation (min)  59 min  -      OT Received On  04/05/21  -      OT Goal Re-Cert Due Date  04/15/21  -        User Key  (r) = Recorded By, (t) = Taken By, (c) = Cosigned By    Initials Name Provider Type    Kami Garduno OTR/L Occupational Therapist        Therapy Charges for Today     Code Description Service Date Service Provider Modifiers Qty    [id number]  OT EVAL MOD COMPLEXITY 4 4/5/2021 Kami Chapman OTR/L GO 1               RADHA Thomas/GABY  4/5/2021

## 2021-04-05 NOTE — PLAN OF CARE
Goal Outcome Evaluation:  Plan of Care Reviewed With: patient     Outcome Summary: OT eval completed. Pt is alert but oriented to self only. She is able to follow approx 75% of one step commands with vcs. Pt with R humeral neck fx and is NWB RUE with sling. She required re-education of NWB status and use of sling. Max A to don sling. Pt with incontient episode and required Max A for dee care and changing brief. Mod A for rolling left and supine to sit at EOB. Max A for LBD seated at EOB. CGA/Min A for sit <> stand t/f from EOB and small steps from bed to w/c. She would benefit from skilled OT services to address these deficits. Recommend d/c to SNF vs. home with 24/7 care for continued skilled therapy services.

## 2021-04-05 NOTE — PROGRESS NOTES
"    [facility]  INPATIENT PROGRESS NOTE    Patient Name: Mini Gentile  Date of Admission: 4/3/2021  Today's Date: 04/05/21  Length of Stay: 1  Primary Care Physician: Bharati Dahl APRN    Subjective   Chief Complaint: Confusion.   HPI   She states that she feels better today.  She received dialysis yesterday and is also currently on dialysis.  She cannot recall why she missed dialysis on Saturday.  She states that she lives with \"a significant other.\"     She had a positive fecal occult test in the ER so GI evaluated.  No plans per their notes.  The patient is desperate to eat and drink this morning.  She has not had any signs of bleeding and has a stable hemoglobin.  We will start a diet.    Review of Systems   All pertinent negatives and positives are as above. All other systems have been reviewed and are negative unless otherwise stated.     Objective    Temp:  [96.9 °F (36.1 °C)-98.9 °F (37.2 °C)] 98.9 °F (37.2 °C)  Heart Rate:  [66-89] 88  Resp:  [11-21] 14  BP: ()/(42-83) 110/49  Physical Exam  Constitutional:       Appearance: She is well-developed.      Comments: Up in bed.  No distress.  No family present.  Seen on dialysis.  Seen and discussed with her nurse, Luisa.   HENT:      Head: Normocephalic and atraumatic.   Eyes:      Conjunctiva/sclera: Conjunctivae normal.      Pupils: Pupils are equal, round, and reactive to light.   Neck:      Vascular: No JVD.   Cardiovascular:      Rate and Rhythm: Normal rate and regular rhythm.      Heart sounds: Normal heart sounds. No murmur heard.   No friction rub. No gallop.       Comments: Left upper extremity AVF accessed for dialysis.  Pulmonary:      Effort: Pulmonary effort is normal. No respiratory distress.      Breath sounds: Normal breath sounds. No wheezing or rales.   Chest:      Chest wall: No tenderness.   Abdominal:      General: Bowel sounds are normal. There is no distension.      " Palpations: Abdomen is soft.      Tenderness: There is no abdominal tenderness. There is no guarding or rebound.   Musculoskeletal:         General: No tenderness or deformity. Normal range of motion.      Cervical back: Neck supple.      Comments: Right upper extremity in a sling.   Skin:     General: Skin is warm and dry.      Findings: No rash.      Comments: Bruising of the right shoulder.   Neurological:      General: No focal deficit present.      Mental Status: She is alert and oriented to person, place, and time.      Cranial Nerves: No cranial nerve deficit.      Motor: Weakness present. No abnormal muscle tone.      Deep Tendon Reflexes: Reflexes normal.   Psychiatric:      Comments: Flat, but conversational.  Does not seem to have any significant confusion at the moment.       Results Review:  I have reviewed the labs, radiology results, and diagnostic studies.    Laboratory Data:   Results from last 7 days   Lab Units 04/05/21  0703 04/05/21  0027 04/04/21  1148 04/04/21  0247 04/03/21  2155   WBC 10*3/mm3  --  3.24*  --  3.73 3.77   HEMOGLOBIN g/dL 9.6* 9.9* 8.5* 10.2* 9.7*   HEMATOCRIT % 30.6* 31.5* 27.1* 32.8* 30.3*   PLATELETS 10*3/mm3  --  155  --  162 177     Results from last 7 days   Lab Units 04/05/21  0027 04/04/21  0247 04/03/21  2340   SODIUM mmol/L 137 137 136   POTASSIUM mmol/L 4.3 4.7 4.9   CHLORIDE mmol/L 97* 98 96*   CO2 mmol/L 21.0* 24.0 24.0   BUN mg/dL 24* 47* 45*   CREATININE mg/dL 3.72* 5.91* 5.52*   CALCIUM mg/dL 9.0 9.5 9.5   BILIRUBIN mg/dL 1.1 0.9 0.7   ALK PHOS U/L 212* 229* 206*   ALT (SGPT) U/L 13 15 9   AST (SGOT) U/L 27 40* 26   GLUCOSE mg/dL 96 89 100*     I have reviewed the patient's current medications.     Assessment/Plan     Active Hospital Problems    Diagnosis    • **Encephalopathy, metabolic, due to uremia    • End stage renal failure on dialysis (CMS/[facility])    • Non-compliance with renal dialysis (CMS/[facility])    • Heme positive stool    • Anemia due to chronic kidney  disease, on chronic dialysis (CMS/[facility])    • Closed fracture of right proximal humerus    • Type 2 diabetes mellitus, with long-term current use of insulin (CMS/[facility])    • HTN (hypertension)      Plan:  The patient known to the service.  She was just in the hospital and discharged on 3/28.  At that time, she had fallen and was found to have a minimally displaced fracture of the proximal end of the right humerus.  She was admitted for orthopedic evaluation and dialysis.  She was ultimately dismissed in stable condition.  Apparently, since that time, she has not done as well as usual and has also missed dialysis recently.  She presented with what was felt to be uremic encephalopathy on 4/3 and was admitted by Dr. Kent.    Nephrology consulted.  She typically dialyzes on a Tuesday, Thursday, and Saturday schedule.  She missed dialysis on Saturday, 4/3.  She was dialyzed on 4/4 by Dr. Caballero and is receiving a treatment this morning.  Nephrology continues to follow.    She has problems with anemia of chronic disease secondary to her renal dysfunction.  She had a fecal occult blood test done in the emergency department that was positive.  She was started on a Protonix drip and GI was consulted.  She has not demonstrated any signs of gastrointestinal bleeding.  No plans for endoscopy at this point according to GI.  Discontinue the Protonix drip and place her on oral Protonix.    She recently suffered a humerus fracture.  She was evaluated by orthopedics on her previous hospitalization.  She was placed in a sling for conservative management.  She has upcoming follow-up in the office for repeat x-rays.    Her most recent hemoglobin A1c was 5.8.  She takes Lantus in the home setting.  Continue Accu-Cheks and sliding scale insulin.    Reconciled and resumed other home medications as appropriate.    PT/OT.    Transfer to the floor today.    Discharge Planning: I expect the patient to be discharged to home with home health versus  SNF in 1-2 days.  We will consult case management to assist in discharge planning.    Electronically signed by Fuentes Cartagena DO, 04/05/21, 08:48 CDT.

## 2021-04-05 NOTE — THERAPY EVALUATION
Acute Care - Speech Language Pathology   Swallow Initial Evaluation [facility]     Patient Name: Mini Gentile  : 1960  MRN: [id number]  Today's Date: 2021               Admit Date: 4/3/2021   Swallowing evaluation completed. Patient was alert, cooperative, and oriented x3. Patient was on a diet of clear liquids per GI. However, Dr. Cartagena wanted patient evaluated for a regular diet. During oral [facility] exam, oral motor movement, range of motion, and structure were WFL. Consistencies given included pureed, honey thick, nectar thick, thin, and regular consistencies. Patient exhibited no signs or symptoms of aspiration during trials. Patient reported that she eats whatever she wants at home and drinks thin liquids. She also reported that she normally takes her medicine whole with thin liquids. SLP recommends [facility] soft diet/thin liquids. [facility] soft food was recommended since patient's dentures may not be available. Medicine can be taken whole with thin liquid. SLP will follow up one time to ensure diet tolerance.   Felipa Avitia, [facility]      Visit Dx:     ICD-10-CM ICD-9-CM   1. Altered mental status, unspecified altered mental status type  R41.82 780.97   2. Uremia  N19 586   3. Gastrointestinal hemorrhage, unspecified gastrointestinal hemorrhage type  K92.2 578.9   4. Elevated troponin  R77.8 790.6   5. Congestive heart failure, unspecified HF chronicity, unspecified heart failure type (CMS/[facility])  I50.9 428.0   6. 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    Eustachian tube dysfunction    Chronic otitis media    Pneumonia of left upper lobe due to Streptococcus (CMS/[facility])    End stage renal failure on dialysis (CMS/[facility])    Diabetes (CMS/[facility])    Glaucoma    HTN (hypertension)    Proteinuria    Type 2 diabetes mellitus, with long-term current use of insulin (CMS/[facility])    Anemia due to chronic kidney disease, on  chronic dialysis (CMS/HCC)    Acute pulmonary edema (CMS/[facility])    Non-compliance with renal dialysis (CMS/[facility])    Elevated troponin due to ESRD     Hyperkalemia    Encephalopathy, metabolic, due to uremia    High anion gap metabolic acidosis due to uremia    Respiratory distress    Acute diastolic heart failure (CMS/[facility])    Lymph node enlargement, left axillary measuring 1.1 cm -follow-up for primary care    Nausea vomiting and diarrhea    Acute diastolic heart failure (CMS/[facility])    Diabetes mellitus with ophthalmic complication (CMS/[facility])    Tobacco abuse    Urinary tract infection with hematuria    Pneumonia due to infectious organism    Abnormal urine findings    Closed fracture of right proximal humerus    Hyponatremia    Hypothyroidism (acquired)    Forehead laceration secondary to fall    Heme positive stool     Past Medical History:   Diagnosis Date    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     Chronic otitis media     Diabetes (CMS/[facility])     End stage renal disease on dialysis (CMS/[facility])     Eustachian tube dysfunction     Glaucoma     HTN (hypertension)     Mucoid otitis media     Noncompliance of patient with renal dialysis (CMS/[facility])     Tobacco abuse      Past Surgical History:   Procedure Laterality Date    ARTERIOVENOUS FISTULA      CATARACT EXTRACTION WITH INTRAOCULAR LENS IMPLANT       SECTION      CHOLECYSTECTOMY      MYRINGOTOMY W/ TUBES Bilateral     MYRINGOTOMY W/ TUBES Right     TUBAL ABDOMINAL LIGATION          SWALLOW EVALUATION (last 72 hours)        [facility] Adult Swallow Evaluation       Row Name 21 0943                   Rehab Evaluation    Document Type  evaluation  -MM (r) SW (t) MM (c)        Subjective Information  no complaints  -MM (r) SW (t) MM (c)        Patient Observations  alert;cooperative;agree to therapy  -MM (r) SW (t) MM (c)        Patient/Family/Caregiver Comments/Observations  no family present  -MM (r) SW (t) MM (c)        Care Plan Review   evaluation/treatment results reviewed  -MM (r) SW (t) MM (c)        Patient Effort  good  -MM (r) SW (t) MM (c)        Symptoms Noted During/After Treatment  none  -MM           General Information    Patient Profile Reviewed  yes  -MM (r) SW (t) MM (c)        Pertinent History Of Current Problem  Primary problem: Altered mental status, CXR with R pleural effusion. Medical history: End stage renal disease requiring HD, diabetes, hypertension, COPD, chronic kidney disease, eustachian tube dysfunction, glaucoma. Recent hospitalization s/p fall and proximal humeral neck fx.   -MM        Current Method of Nutrition  NPO  -MM        Precautions/Limitations, Vision  WFL;for purposes of eval  -MM (r) SW (t) MM (c)        Precautions/Limitations, Hearing  WFL;for purposes of eval  -MM (r) SW (t) MM (c)        Prior Level of Function-Communication  unknown  -MM (r) SW (t) MM (c)        Prior Level of Function-Swallowing  no diet consistency restrictions  -MM (r) SW (t) MM (c)        Plans/Goals Discussed with  patient  -MM (r) SW (t) MM (c)        Barriers to Rehab  none identified  -MM (r) SW (t) MM (c)        Patient's Goals for Discharge  patient did not state Wanted coffee  -MM           Pain    Additional Documentation  Pain Scale: FACES Pre/Post-Treatment (Group)  -MM (r) SW (t) MM (c)           Pain Scale: FACES Pre/Post-Treatment    Pain: FACES Scale, Pretreatment  0-->no hurt  -MM (r) SW (t) MM (c)        Posttreatment Pain Rating  0-->no hurt  -MM (r) SW (t) MM (c)           Oral Motor Structure and Function    Dentition Assessment  has dentures but unable to use them to poor fit/pain  -MM        Secretion Management  WNL/WFL  -MM (r) SW (t) MM (c)        Mucosal Quality  moist, healthy  -MM (r) SW (t) MM (c)        Volitional Swallow  WFL  -MM (r) SW (t) MM (c)           Oral Musculature and Cranial Nerve Assessment    Oral Motor General Assessment  WFL  -MM (r) SW (t) MM (c)           General Eating/Swallowing  Observations    Eating/Swallowing Skills  fed by SLP  -MM (r) SW (t) MM (c)        Positioning During Eating  semi-reclined  -MM (r) SW (t) MM (c)        Utensils Used  straw;spoon  -MM        Consistencies Trialed  pudding thick;honey-thick liquids;nectar/syrup-thick liquids;thin liquids;regular textures  -MM (r) SW (t) MM (c)           Clinical Swallow Eval    Oral Prep Phase  WFL  -MM (r) SW (t) MM (c)        Oral Transit  WFL  -MM (r) SW (t) MM (c)        Oral Residue  WFL  -MM (r) SW (t) MM (c)        Pharyngeal Phase  WFL  -MM (r) SW (t) MM (c)        Esophageal Phase  unremarkable  -MM (r) SW (t) MM (c)           Clinical Impression    Daily Summary of Progress (SLP)  --  -MM        Barriers to Overall Progress (SLP)  n/a  -MM (r) SW (t) MM (c)        SLP Swallowing Diagnosis  R/O pharyngeal dysphagia  -MM        Functional Impact  risk of aspiration/pneumonia  -MM        Rehab Potential/Prognosis, Swallowing  good, to achieve stated therapy goals  -MM (r) SW (t) MM (c)        Swallow Criteria for Skilled Therapeutic Interventions Met  demonstrates skilled criteria  -MM           Recommendations    Therapy Frequency (Swallow)  PRN  -MM (r) SW (t) MM (c)        Predicted Duration Therapy Intervention (Days)  2 days  -MM (r) SW (t) MM (c)        SLP Diet Recommendation  soft textures;thin liquids  -MM (r) SW (t) MM (c)        Recommended Diagnostics  SLE/Cog/Motor Speech Evaluation If not back to baseline  -MM        Recommended Precautions and Strategies  upright posture during/after eating;small bites of food and sips of liquid;general aspiration precautions  -MM        Oral Care Recommendations  Oral Care BID/PRN;Toothbrush  -MM        SLP Rec. for Method of Medication Administration  meds whole;with thin liquids  -MM (r) SW (t) MM (c)        Monitor for Signs of Aspiration  yes;notify SLP if any concerns  -MM (r) SW (t) MM (c)        Anticipated Discharge Disposition (SLP)  unknown  -MM (r) SW (t) MM (c)            Swallow Goals (SLP)    Oral Nutrition/Hydration Goal Selection (SLP)  oral nutrition/hydration, SLP goal 1  -MM (r) SW (t) MM (c)           Oral Nutrition/Hydration Goal 1 (SLP)    Oral Nutrition/Hydration Goal 1, SLP  Patient will tolerate LRD without s/s of aspiration  -MM (r) SW (t) MM (c)        Time Frame (Oral Nutrition/Hydration Goal 1, SLP)  by discharge  -MM (r) SW (t) MM (c)        Barriers (Oral Nutrition/Hydration Goal 1, SLP)  none  -MM        Progress/Outcomes (Oral Nutrition/Hydration Goal 1, SLP)  goal ongoing  -MM (r) SW (t) MM (c)              User Key  (r) = Recorded By, (t) = Taken By, (c) = Cosigned By      Initials Name Effective Dates    Maia Garcia, MS CCC-SLP 07/12/20 -     Felipa Angeles, Speech Therapy Student 01/18/21 -             EDUCATION  The patient has been educated in the following areas:   Dysphagia (Swallowing Impairment).    SLP Recommendation and Plan  SLP Swallowing Diagnosis: R/O pharyngeal dysphagia     Recommended Precautions and Strategies: upright posture during/after eating, small bites of food and sips of liquid, general aspiration precautions           Recommended Diagnostics: SLE/Cog/Motor Speech Evaluation (If not back to baseline)  Swallow Criteria for Skilled Therapeutic Interventions Met: demonstrates skilled criteria                                          SLP GOALS       Row Name 04/05/21 0943             Oral Nutrition/Hydration Goal 1 (SLP)    Oral Nutrition/Hydration Goal 1, SLP  Patient will tolerate LRD without s/s of aspiration  -MM (r) SW (t) MM (c)      Time Frame (Oral Nutrition/Hydration Goal 1, SLP)  by discharge  -MM (r) SW (t) MM (c)      Barriers (Oral Nutrition/Hydration Goal 1, SLP)  none  -MM      Progress/Outcomes (Oral Nutrition/Hydration Goal 1, SLP)  goal ongoing  -MM (r) SW (t) MM (c)            User Key  (r) = Recorded By, (t) = Taken By, (c) = Cosigned By      Initials Name Provider Type    Maia Garcia MS  CCC-SLP Speech and Language Pathologist    Felipa Angeles, Speech Therapy Student Speech Therapy Student                Time Calculation:   Time Calculation- SLP       Row Name 04/05/21 1031             Time Calculation- SLP    SLP Start Time  0938  -MM (r) SW (t) MM (c)      SLP Stop Time  1048  -MM      SLP Time Calculation (min)  70 min  -MM      SLP Received On  04/05/21  -MM (r) SW (t) MM (c)      SLP Goal Re-Cert Due Date  04/15/21  -MM (r) SW (t) MM (c)            User Key  (r) = Recorded By, (t) = Taken By, (c) = Cosigned By      Initials Name Provider Type    Maia Garcia MS CCC-SLP Speech and Language Pathologist    Felipa Angeles, Speech Therapy Student Speech Therapy Student            Therapy Charges for Today       Code Description Service Date Service Provider Modifiers Qty    [id number] HC ST EVAL ORAL PHARYNG SWALLOW 5 4/5/2021 Maia Gonsalez MS CCC-SLP GN 1                 Maia Gonsalez MS CCC-DARREN  4/5/2021

## 2021-04-06 NOTE — PLAN OF CARE
Goal Outcome Evaluation:   No falls or injuries this shift.  Call light within reach.  Rested sitting up in recliner.  Donut cushion obtained and placed under pt at beginning of shift.  Frequent repositioning with pillow support.  Pain med given twice in shift.  Will continue to monitor for needs.  Elda Jensen RN Staff Nurse

## 2021-04-06 NOTE — THERAPY DISCHARGE NOTE
Acute Care - Speech Language Pathology Initial Evaluation/Discharge  [facility]     Patient Name: Mini Gentile  : 1960  MRN: [id number]  Today's Date: 2021               Admit Date: 4/3/2021    speech/language/cognitive evaluation completed. Pt does have visual deficits and unable to see items to name but given semantic cues as well as asking pt to name items she is holding, pt does well. Delay in all responses and occasional cues needed in order to complete a task. Pt able to answer questions appropriately without cues including: yes/no questions, phrase/sentence completion, sentence formulation, divergent/convergent naming, problem solving, and recall. Pt states she is back to baseline status and she lives with boyfriend. Pt spouse helps with medication, finances, and cooking per pt report. No concerns per RN. ST services no longer warranted. MD to reconsult if change or new concern.  Vonnie Harris, CCC-SLP 2021 09:56 CDT      Visit Dx:    ICD-10-CM ICD-9-CM   1. Altered mental status, unspecified altered mental status type  R41.82 780.97   2. Uremia  N19 586   3. Gastrointestinal hemorrhage, unspecified gastrointestinal hemorrhage type  K92.2 578.9   4. Elevated troponin  R77.8 790.6   5. Congestive heart failure, unspecified HF chronicity, unspecified heart failure type (CMS/[facility])  I50.9 428.0   6. Dysphagia, unspecified type  R13.10 787.20   7. Impaired mobility and ADLs  Z74.09 V49.89    Z78.9    8. Impaired mobility  Z74.09 799.89   9. 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   • Eustachian tube dysfunction   • Chronic otitis media   • Pneumonia of left upper lobe due to Streptococcus (CMS/[facility])   • End stage renal failure on dialysis (CMS/[facility])   • Diabetes (CMS/[facility])   • Glaucoma   • HTN (hypertension)   • Proteinuria   • Type 2 diabetes mellitus, with long-term  current use of insulin (CMS/[facility])   • Anemia due to chronic kidney disease, on chronic dialysis (CMS/[facility])   • Acute pulmonary edema (CMS/[facility])   • Non-compliance with renal dialysis (CMS/[facility])   • Elevated troponin due to ESRD    • Hyperkalemia   • Encephalopathy, metabolic, due to uremia   • High anion gap metabolic acidosis due to uremia   • Respiratory distress   • Acute diastolic heart failure (CMS/[facility])   • Lymph node enlargement, left axillary measuring 1.1 cm -follow-up for primary care   • Nausea vomiting and diarrhea   • Acute diastolic heart failure (CMS/[facility])   • Diabetes mellitus with ophthalmic complication (CMS/[facility])   • Tobacco abuse   • Urinary tract infection with hematuria   • Pneumonia due to infectious organism   • Abnormal urine findings   • Closed fracture of right proximal humerus   • Hyponatremia   • Hypothyroidism (acquired)   • Forehead laceration secondary to fall   • Heme positive stool     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    • Chronic otitis media    • Diabetes (CMS/[facility])    • End stage renal disease on dialysis (CMS/[facility])    • Eustachian tube dysfunction    • Glaucoma    • HTN (hypertension)    • Mucoid otitis media    • Noncompliance of patient with renal dialysis (CMS/[facility])    • Tobacco abuse      Past Surgical History:   Procedure Laterality Date   • ARTERIOVENOUS FISTULA     • CATARACT EXTRACTION WITH INTRAOCULAR LENS IMPLANT     •  SECTION     • CHOLECYSTECTOMY     • MYRINGOTOMY W/ TUBES Bilateral    • MYRINGOTOMY W/ TUBES Right    • TUBAL ABDOMINAL LIGATION            SLP EVALUATION (last 72 hours)      SLP SLC Evaluation     Row Name 21 0927                   Communication Assessment/Intervention    Document Type  discharge evaluation/summary  -BN        Subjective Information  no complaints  -BN        Patient Observations  alert;cooperative  -BN        Patient/Family/Caregiver Comments/Observations  no family  present  -BN        Care Plan Review  evaluation/treatment results reviewed  -BN        Care Plan Review, Other Participant(s)  caregiver  -BN        Patient Effort  good  -BN           General Information    Patient Profile Reviewed  yes  -BN        Pertinent History Of Current Problem  Primary problem: Altered mental status, CXR with R pleural effusion. Medical history: End stage renal disease requiring HD, diabetes, hypertension, COPD, chronic kidney disease, eustachian tube dysfunction, glaucoma. Recent hospitalization s/p fall and proximal humeral neck fx.   -BN        Precautions/Limitations, Vision  vision impairment, bilaterally  -BN        Precautions/Limitations, Hearing  WFL;for purposes of eval  -BN        Prior Level of Function-Communication  WFL  -BN        Plans/Goals Discussed with  patient;other (see comments) RN, Luisa  -BN        Barriers to Rehab  none identified  -BN        Patient's Goals for Discharge  return to home  -BN           Pain    Additional Documentation  Pain Scale: FACES Pre/Post-Treatment (Group)  -BN           Pain Scale: FACES Pre/Post-Treatment    Pain: FACES Scale, Pretreatment  0-->no hurt  -BN        Posttreatment Pain Rating  0-->no hurt  -BN           Comprehension Assessment/Intervention    Comprehension Assessment/Intervention  Auditory Comprehension  -BN           Auditory Comprehension Assessment/Intervention    Auditory Comprehension (Communication)  WFL  -BN        Able to Identify Objects/Pictures (Communication)  WFL  -BN        Answers Questions (Communication)  yes/no;simple;WFL  -BN        Able to Follow Commands (Communication)  WFL  -BN           Expression Assessment/Intervention    Expression Assessment/Intervention  verbal expression  -BN           Verbal Expression Assessment/Intervention    Verbal Expression  WFL  -BN        Automatic Speech (Communication)  WFL  -BN        Phrase Completion  WFL  -BN        Responsive Naming  WFL  -BN        Sentence  Formulation  simple;complex;WFL  -BN           Motor Speech Assessment/Intervention    Motor Speech Function  WFL  -BN           Cognitive Assessment Intervention- SLP    Cognitive Function (Cognition)  WFL  -BN        Orientation Status (Cognition)  awareness of basic personal information;person;place;situation;WFL;time;mild impairment  -BN        Memory (Cognitive)  simple;mod-complex;immediate;WFL  -BN        Attention (Cognitive)  mild impairment  -BN        Thought Organization (Cognitive)  concrete divergent;concrete convergent;WFL  -BN        Problem Solving (Cognitive)  simple;WFL  -BN           SLP Clinical Impressions    SLP Diagnosis  WFL  -        SLC Criteria for Skilled Therapy Interventions Met  baseline status  -BN           Recommendations    Therapy Frequency (SLP SLC)  evaluation only  -BN        Anticipated Discharge Disposition (SLP)  unknown  -          User Key  (r) = Recorded By, (t) = Taken By, (c) = Cosigned By    Initials Name Effective Dates    Vonnie Taveras CCC-SLP 02/11/20 -            EDUCATION  The patient has been educated in the following areas:   Cognitive Impairment Communication Impairment.      SLP Recommendation and Plan  SLP Diagnosis: [facility] Criteria for Skilled Therapy Interventions Met: baseline status  Anticipated Discharge Disposition (SLP): unknown        Daily Summary of Progress (SLP): progress toward functional goals is good  Plan for Continued Treatment (SLP): d/c swallow goals        Reason for Discharge: all goals and outcomes met, no further needs identified    Plan of Care Reviewed With: patient, caregiver  Progress: no change (eval)  Outcome Summary: ST speech/language/cognitive evaluation completed. Pt does have visual deficits and unable to see items to name but given semantic cues as well as asking pt to name items she is holding, pt does well. Delay in all responses and occasional cues needed in order to complete a task. Pt able to  answer questions appropriately without cues including: yes/no questions, phrase/sentence completion, sentence formulation, divergent/convergent naming, problem solving, and recall. Pt states she is back to baseline status and she lives with boyfriend. Pt spouse helps with medication, finances, and cooking per pt report. No concerns per RN. ST services no longer warranted. MD to reconsult if change or new concern.      SLP GOALS     Row Name 04/06/21 0826 04/05/21 0943          Oral Nutrition/Hydration Goal 1 (SLP)    Oral Nutrition/Hydration Goal 1, SLP  Patient will tolerate LRD without s/s of aspiration  -BN  Patient will tolerate LRD without s/s of aspiration  -MM (r) SW (t) MM (c)     Time Frame (Oral Nutrition/Hydration Goal 1, SLP)  by discharge  -BN  by discharge  -MM (r) SW (t) MM (c)     Barriers (Oral Nutrition/Hydration Goal 1, SLP)  none  -BN  none  -MM     Progress/Outcomes (Oral Nutrition/Hydration Goal 1, SLP)  goal met  -BN  goal ongoing  -MM (r) SW (t) MM (c)       User Key  (r) = Recorded By, (t) = Taken By, (c) = Cosigned By    Initials Name Provider Type    Vonnie Taveras CCC-SLP Speech and Language Pathologist    Maia Garcia, MS CCC-SLP Speech and Language Pathologist    Felipa Angeles, Speech Therapy Student Speech Therapy Student              Time Calculation:   Time Calculation- SLP     Row Name 04/06/21 0955 04/06/21 0847          Time Calculation- SLP    SLP Start Time  0927  -BN  0826  -BN     SLP Stop Time  0956  -BN  0849  -BN     SLP Time Calculation (min)  29 min  -BN  23 min  -BN     SLP Received On  04/06/21  -BN  04/06/21  -BN       User Key  (r) = Recorded By, (t) = Taken By, (c) = Cosigned By    Initials Name Provider Type    Vonnie Taveras CCC-SLP Speech and Language Pathologist          Therapy Charges for Today     Code Description Service Date Service Provider Modifiers Qty    61329197580  ST TREATMENT SWALLOW 2 4/6/2021 Vonnie Harris  Say CCC-SLP GN 1    [id number]  ST EVAL SPEECH AND PROD W LANG  2 4/6/2021 Vonnie Harris CCC-SLP GN 1                   SLP Discharge Summary  Anticipated Discharge Disposition (SLP): unknown  Reason for Discharge: all goals and outcomes met, no further needs identified  Progress Toward Achieving Short/long Term Goals: all goals met within established timelines  Discharge Destination: other (see comments) (still in acute care)    Vonnie Harris CCC-SLP  4/6/2021

## 2021-04-06 NOTE — PROGRESS NOTES
[facility] Medicine Services  INPATIENT PROGRESS NOTE    Patient Name: Mini Gentile  Date of Admission: 4/3/2021  Today's Date: 04/06/21  Length of Stay: 2  Primary Care Physician: Bharati Dahl APRN    Subjective   Chief Complaint: Confusion.   HPI   She had a dark bowel movement this morning according to the nursing staff.  Hemoglobin 9.1 after being 9.6.  GI to be made aware.    She still has periods of confusion and was said to have a hallucination this morning. I spoke with her significant other, Jose Bowling, at [phone]. He was concerned with her being off of her Lyrica causing her problem.  He stated that when she is off of it she does not do well.  She also never needs to be back on gabapentin instead of Lyrica because it caused her significant problems.    Family is looking into skilled nursing placement.    Review of Systems   All pertinent negatives and positives are as above. All other systems have been reviewed and are negative unless otherwise stated.     Objective    Temp:  [97.9 °F (36.6 °C)-99.1 °F (37.3 °C)] 99.1 °F (37.3 °C)  Heart Rate:  [70-88] 80  Resp:  [18] 18  BP: ()/(44-76) 112/69  Physical Exam  Constitutional:       Appearance: She is well-developed.      Comments: Up in bed.  No distress.  No family present. Discussed with her nurse, Luisa.   HENT:      Head: Normocephalic and atraumatic.   Eyes:      Conjunctiva/sclera: Conjunctivae normal.      Pupils: Pupils are equal, round, and reactive to light.   Neck:      Vascular: No JVD.   Cardiovascular:      Rate and Rhythm: Normal rate and regular rhythm.      Heart sounds: Normal heart sounds. No murmur heard.   No friction rub. No gallop.       Comments: Left upper extremity AVF.  Pulmonary:      Effort: Pulmonary effort is normal. No respiratory distress.      Breath sounds: Normal breath sounds. No wheezing or rales.   Chest:      Chest wall: No tenderness.   Abdominal:      General:  Bowel sounds are normal. There is no distension.      Palpations: Abdomen is soft.      Tenderness: There is no abdominal tenderness. There is no guarding or rebound.   Musculoskeletal:         General: No tenderness or deformity. Normal range of motion.      Cervical back: Neck supple.      Comments: Right upper extremity in a sling.   Skin:     General: Skin is warm and dry.      Findings: No rash.      Comments: Bruising of the right shoulder.   Neurological:      General: No focal deficit present.      Mental Status: She is alert and oriented to person, place, and time.      Cranial Nerves: No cranial nerve deficit.      Motor: Weakness present. No abnormal muscle tone.      Deep Tendon Reflexes: Reflexes normal.   Psychiatric:      Comments: Flat, but conversational.  Does not seem to have any significant confusion at the moment.       Results Review:  I have reviewed the labs, radiology results, and diagnostic studies.    Laboratory Data:   Results from last 7 days   Lab Units 04/06/21 0343 04/05/21  0703 04/05/21 0027 04/04/21  0247   WBC 10*3/mm3 3.99  --  3.24* 3.73   HEMOGLOBIN g/dL 9.1* 9.6* 9.9* 10.2*   HEMATOCRIT % 28.4* 30.6* 31.5* 32.8*   PLATELETS 10*3/mm3 151  --  155 162     Results from last 7 days   Lab Units 04/06/21  0343 04/05/21  0027 04/04/21  0247 04/03/21  2340   SODIUM mmol/L 137 137 137 136   POTASSIUM mmol/L 4.1 4.3 4.7 4.9   CHLORIDE mmol/L 99 97* 98 96*   CO2 mmol/L 25.0 21.0* 24.0 24.0   BUN mg/dL 21 24* 47* 45*   CREATININE mg/dL 2.69* 3.72* 5.91* 5.52*   CALCIUM mg/dL 9.2 9.0 9.5 9.5   BILIRUBIN mg/dL  --  1.1 0.9 0.7   ALK PHOS U/L  --  212* 229* 206*   ALT (SGPT) U/L  --  13 15 9   AST (SGOT) U/L  --  27 40* 26   GLUCOSE mg/dL 215* 96 89 100*     I have reviewed the patient's current medications.     Assessment/Plan     Active Hospital Problems    Diagnosis    • **Encephalopathy, metabolic, due to uremia    • End stage renal failure on dialysis (CMS/HCC)    • Non-compliance  with renal dialysis (CMS/[facility])    • Heme positive stool    • Anemia due to chronic kidney disease, on chronic dialysis (CMS/[facility])    • Closed fracture of right proximal humerus    • Type 2 diabetes mellitus, with long-term current use of insulin (CMS/[facility])    • HTN (hypertension)      Plan:  The patient known to the service.  She was just in the hospital and discharged on 3/28.  At that time, she had fallen and was found to have a minimally displaced fracture of the proximal end of the right humerus.  She was admitted for orthopedic evaluation and dialysis.  She was ultimately dismissed in stable condition.  Apparently, since that time, she has not done as well as usual and has also missed dialysis recently.  She presented with what was felt to be uremic encephalopathy on 4/3 and was admitted by Dr. Kent.    Nephrology consulted.  She typically dialyzes on a Tuesday, Thursday, and Saturday schedule.  She missed dialysis on Saturday, 4/3.  She was dialyzed on 4/4 by Dr. Caballero and is receiving a treatment this morning.  Nephrology continues to follow.    She has problems with anemia of chronic disease secondary to her renal dysfunction.  She had a fecal occult blood test done in the emergency department that was positive.  She was started on a Protonix drip and GI was consulted. No plans for endoscopy at this point according to GI.  She did have a dark bowel movement this morning according to the nursing staff. Discontinued the Protonix drip and placed her on oral Protonix on 4/5.    She recently suffered a humerus fracture.  She was evaluated by orthopedics on her previous hospitalization.  She was placed in a sling for conservative management.  She has upcoming follow-up in the office for repeat x-rays.    Her most recent hemoglobin A1c was 5.8.  She takes Lantus in the home setting.  Continue Accu-Cheks and sliding scale insulin.    Reconciled and resumed other home medications as appropriate.    PT/OT.    Transferred  to the floor on 4/5.    Discharge Planning: Looking into SNF placement.    Electronically signed by Fuentes Cartagena DO, 04/06/21, 09:17 CDT.

## 2021-04-06 NOTE — THERAPY TREATMENT NOTE
Acute Care - Occupational Therapy Treatment Note  [facility]     Patient Name: Mini Gentile  : 1960  MRN: [id number]  Today's Date: 2021             Admit Date: 4/3/2021       ICD-10-CM ICD-9-CM   1. Altered mental status, unspecified altered mental status type  R41.82 780.97   2. Uremia  N19 586   3. Gastrointestinal hemorrhage, unspecified gastrointestinal hemorrhage type  K92.2 578.9   4. Elevated troponin  R77.8 790.6   5. Congestive heart failure, unspecified HF chronicity, unspecified heart failure type (CMS/[facility])  I50.9 428.0   6. Dysphagia, unspecified type  R13.10 787.20   7. Impaired mobility and ADLs  Z74.09 V49.89    Z78.9    8. Impaired mobility  Z74.09 799.89   9. 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   • Eustachian tube dysfunction   • Chronic otitis media   • Pneumonia of left upper lobe due to Streptococcus (CMS/[facility])   • End stage renal failure on dialysis (CMS/[facility])   • Diabetes (CMS/[facility])   • Glaucoma   • HTN (hypertension)   • Proteinuria   • Type 2 diabetes mellitus, with long-term current use of insulin (CMS/[facility])   • Anemia due to chronic kidney disease, on chronic dialysis (CMS/[facility])   • Acute pulmonary edema (CMS/[facility])   • Non-compliance with renal dialysis (CMS/[facility])   • Elevated troponin due to ESRD    • Hyperkalemia   • Encephalopathy, metabolic, due to uremia   • High anion gap metabolic acidosis due to uremia   • Respiratory distress   • Acute diastolic heart failure (CMS/[facility])   • Lymph node enlargement, left axillary measuring 1.1 cm -follow-up for primary care   • Nausea vomiting and diarrhea   • Acute diastolic heart failure (CMS/[facility])   • Diabetes mellitus with ophthalmic complication (CMS/[facility])   • Tobacco abuse   • Urinary tract infection with hematuria   • Pneumonia due to infectious organism   • Abnormal urine findings   • Closed fracture of right  proximal humerus   • Hyponatremia   • Hypothyroidism (acquired)   • Forehead laceration secondary to fall   • Heme positive stool     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    • Chronic otitis media    • Diabetes (CMS/HCC)    • End stage renal disease on dialysis (CMS/HCC)    • Eustachian tube dysfunction    • Glaucoma    • HTN (hypertension)    • Mucoid otitis media    • Noncompliance of patient with renal dialysis (CMS/HCC)    • Tobacco abuse      Past Surgical History:   Procedure Laterality Date   • ARTERIOVENOUS FISTULA     • CATARACT EXTRACTION WITH INTRAOCULAR LENS IMPLANT     •  SECTION     • CHOLECYSTECTOMY     • MYRINGOTOMY W/ TUBES Bilateral    • MYRINGOTOMY W/ TUBES Right    • TUBAL ABDOMINAL LIGATION              OT ASSESSMENT FLOWSHEET (last 12 hours)      OT Evaluation and Treatment     Row Name 21 0757                   OT Time and Intention    Subjective Information  complains of;weakness;fatigue;pain  -        Document Type  therapy note (daily note)  -        Mode of Treatment  occupational therapy  -        Patient Effort  adequate  -           General Information    Existing Precautions/Restrictions  brace worn when out of bed;fall;non-weight bearing Rue sling/nwb/pain!  -           Pain Assessment    Additional Documentation  Pain Scale: Word Pre/Post-Treatment (Group)  -           Pain Scale: Numbers Pre/Post-Treatment    Pain Intervention(s)  Rest  -           Pain Scale: Word Pre/Post-Treatment    Pain: Word Scale, Pretreatment  2 - mild pain  -        Posttreatment Pain Rating  2 - mild pain  -        Pain Location - Side  Right  -        Pain Location - Orientation  upper  -        Pain Location  shoulder  -           Bed Mobility    Comment (Bed Mobility)  sitting in chair!  -           Activities of Daily Living    39775 - OT Self Care/Mgmt Minutes  43  -        BADL Assessment/Intervention   feeding  -           Self-Feeding Assessment/Training    [place] Level (Feeding)  feeding skills;liquids to mouth;prepare tray/open items;scoop food and bring to mouth;set up;verbal cues;minimum assist (75% patient effort)  -        Assistive Devices (Feeding)  built-up handle utensils  -        Position (Self-Feeding)  supported sitting  -           Wound 04/04/21 0142 coccyx    Wound - Properties Group Placement Date: 04/04/21  -VT Placement Time: 0142 -VT Location: coccyx  -VT    Retired Wound - Properties Group Date first assessed: 04/04/21  -VT Time first assessed: 0142 -VT Location: coccyx  -VT       Positioning and Restraints    Pre-Treatment Position  sitting in chair/recliner  -        Post Treatment Position  chair  -CJ        In Chair  sitting;call light within reach;encouraged to call for assist;reclined;legs elevated  -           Progress Summary (OT)    Progress Toward Functional Goals (OT)  progress toward functional goals is fair  -        Barriers to Overall Progress (OT)  Fall/pain/nwb Rue/sling on Rue!  -        Impairments Still Limiting Function (OT)  continue with ot poc!  -          User Key  (r) = Recorded By, (t) = Taken By, (c) = Cosigned By    Initials Name Effective Dates     Jacky Zhang COTA/L 08/02/16 -     VT Alyx Watson RN 08/02/16 -          Occupational Therapy Education                 Title: PT OT SLP Therapies (Not Started)     Topic: Occupational Therapy (In Progress)     Point: ADL training (Done)     Description:   Instruct learner(s) on proper safety adaptation and remediation techniques during self care or transfers.   Instruct in proper use of assistive devices.              Learning Progress Summary           Patient Acceptance, E,TB, VU,DU,NR by  at 4/6/2021 2532    Comment: Pt. required assist with self feeding!    Acceptance, E, NR by JMICHAEL at 4/5/2021 1422                   Point: Home exercise program (Not Started)      Description:   Instruct learner(s) on appropriate technique for monitoring, assisting and/or progressing therapeutic exercises/activities.              Learner Progress:  Not documented in this visit.          Point: Precautions (Done)     Description:   Instruct learner(s) on prescribed precautions during self-care and functional transfers.              Learning Progress Summary           Patient Acceptance, E,TB, VU,DU,NR by  at 4/6/2021 0757    Comment: Pt. required assist with self feeding!    Acceptance, E, NR by TSERING at 4/5/2021 1422                   Point: Body mechanics (Done)     Description:   Instruct learner(s) on proper positioning and spine alignment during self-care, functional mobility activities and/or exercises.              Learning Progress Summary           Patient Acceptance, E,TB, VU,DU,NR by  at 4/6/2021 0757    Comment: Pt. required assist with self feeding!    Acceptance, E, NR by TSERING at 4/5/2021 1422                               User Key     Initials Effective Dates Name Provider Type Discipline     08/02/16 -  Jacky Zhang HOOD/L Occupational Therapy Assistant OT     12/04/20 -  Kami Chapman OTR/L Occupational Therapist OT                  OT Recommendation and Plan     Progress Toward Functional Goals (OT): progress toward functional goals is fair  Plan of Care Review  Plan of Care Reviewed With: patient  Progress: improving  Outcome Summary: Pt. sitting in chair with sling on Rue! This hood/l provided food prep and liquids to mouth and scooping food for pt. to feed self! Built up handle for utensils provided!  Plan of Care Reviewed With: patient  Outcome Summary: Pt. sitting in chair with sling on Rue! This hood/l provided food prep and liquids to mouth and scooping food for pt. to feed self! Built up handle for utensils provided!    Outcome Measures     Row Name 04/06/21 0757             How much help from another is currently needed...    Putting on and taking off  regular lower body clothing?  2  -CJ      Bathing (including washing, rinsing, and drying)  2  -CJ      Toileting (which includes using toilet bed pan or urinal)  2  -CJ      Putting on and taking off regular upper body clothing  2  -CJ      Taking care of personal grooming (such as brushing teeth)  2  -CJ      Eating meals  2  -CJ      AM-PAC 6 Clicks Score (OT)  12  -CJ         Functional Assessment    Outcome Measure Options  AM-PAC 6 Clicks Daily Activity (OT)  -        User Key  (r) = Recorded By, (t) = Taken By, (c) = Cosigned By    Initials Name Provider Type     Jacky Zhang COTA/L Occupational Therapy Assistant          Time Calculation:   Time Calculation- OT     Row Name 04/06/21 0757             Time Calculation- OT    OT Start Time  0757  -      OT Stop Time  0840  -      OT Time Calculation (min)  43 min  -      Total Timed Code Minutes- OT  43 minute(s)  -      TCU Minutes- OT  43 min  -      OT Received On  04/06/21  -         Timed Charges    11243 - OT Self Care/Mgmt Minutes  43  -        User Key  (r) = Recorded By, (t) = Taken By, (c) = Cosigned By    Initials Name Provider Type     Jacky Zhang COTA/L Occupational Therapy Assistant        Therapy Charges for Today     Code Description Service Date Service Provider Modifiers Qty    57771311955 HC OT SELF CARE/MGMT/TRAIN EA 15 MIN 4/6/2021 Jacky Zhang COTA/L GO 3               TAMIKO Alvarez  4/6/2021

## 2021-04-06 NOTE — THERAPY TREATMENT NOTE
Acute Care - Physical Therapy Treatment Note  [facility]     Patient Name: Mini Gentile  : 1960  MRN: [id number]  Today's Date: 2021           PT Assessment (last 12 hours)      PT Evaluation and Treatment     Row Name 21 1116          Physical Therapy Time and Intention    Subjective Information  complains of;dizziness when initially standing   -WK     Document Type  therapy note (daily note)  -WK     Mode of Treatment  physical therapy  -WK     Row Name 21 1116          General Information    Existing Precautions/Restrictions  right;non-weight bearing NWB R UE with sling on when out of bed   -WK     Limitations/Impairments  visual states she see nothing but blurry objects   -WK     Row Name 21 1116          Bed Mobility    Comment (Bed Mobility)  up in chair   -WK     Row Name 21 1116          Transfers    Sit-Stand [place] (Transfers)  verbal cues;minimum assist (75% patient effort) stood x3, was dizzy on first stand, rested and improved   -WK     Row Name 21 1116          Gait/Stairs (Locomotion)    [place] Level (Gait)  verbal cues;contact guard;minimum assist (75% patient effort) cues to avoid objects   -WK     Assistive Device (Gait)  -- HHA  -WK     Distance in Feet (Gait)  10 sitting rest, 30'   -WK     Comment (Gait/Stairs)  pt had 2 LOB requiring min A to correct.   -WK     Row Name 21 111          Motor Skills    Therapeutic Exercise  aerobic  -WK     Row Name 21 1116          Aerobic Exercise    Comment, Aerobic Exercise (Therapeutic Exercise)  AROM LAQ 10 reps   -WK     Row Name             Wound 21 0142 coccyx    Wound - Properties Group Placement Date: 21  -VT Placement Time: 142 Location: coccyx  -VT    Retired Wound - Properties Group Date first assessed: 21  -VT Time first assessed: 142  -VT Location: coccyx  -VT    Row Name 21 111          Plan of Care Review    Plan of Care Reviewed With   patient  -WK     Progress  improving  -WK     Outcome Summary  Pt amb 10' sitting rest and then 30' with CGA-Gwen with HHA.  Pt had 2 LOB requiring min A to correct.  pt also required verbal cues to avoid objects.   -WK     Row Name 04/06/21 1116          Positioning and Restraints    Pre-Treatment Position  sitting in chair/recliner  -WK     Post Treatment Position  chair  -WK     In Chair  reclined;call light within reach;encouraged to call for assist;notified nsg  -WK       User Key  (r) = Recorded By, (t) = Taken By, (c) = Cosigned By    Initials Name Provider Type    WK Carol Preston PTA Physical Therapy Assistant    VT Alyx Watson RN Registered Nurse        Physical Therapy Education                 Title: PT OT SLP Therapies (Not Started)     Topic: Physical Therapy (In Progress)     Point: Mobility training (Done)     Learning Progress Summary           Patient Acceptance, E, VU by WK at 4/6/2021 1155    Comment: POC                   Point: Home exercise program (Not Started)     Learner Progress:  Not documented in this visit.          Point: Body mechanics (Not Started)     Learner Progress:  Not documented in this visit.          Point: Precautions (Done)     Learning Progress Summary           Patient Acceptance, E, VU,NR by LW at 4/5/2021 1309    Comment: Pt educated on WB precautions in RUE; demoed how to assist in bed mobility appropriately.                               User Key     Initials Effective Dates Name Provider Type Discipline    WK 08/02/16 -  Carol Preston PTA Physical Therapy Assistant PT    LW 02/23/21 -  Simin Lr PT Student PT Student PT              PT Recommendation and Plan     Plan of Care Reviewed With: patient  Progress: improving  Outcome Summary: Pt amb 10' sitting rest and then 30' with CGA-Gwen with HHA.  Pt had 2 LOB requiring min A to correct.  pt also required verbal cues to avoid objects.   Outcome Measures     Row Name 04/06/21 0757             How  much help from another is currently needed...    Putting on and taking off regular lower body clothing?  2  -CJ      Bathing (including washing, rinsing, and drying)  2  -CJ      Toileting (which includes using toilet bed pan or urinal)  2  -CJ      Putting on and taking off regular upper body clothing  2  -CJ      Taking care of personal grooming (such as brushing teeth)  2  -CJ      Eating meals  2  -CJ      AM-PAC 6 Clicks Score (OT)  12  -CJ         Functional Assessment    Outcome Measure Options  AM-PAC 6 Clicks Daily Activity (OT)  -        User Key  (r) = Recorded By, (t) = Taken By, (c) = Cosigned By    Initials Name Provider Type     Jacky Zhang COTA/L Occupational Therapy Assistant           Time Calculation:   PT Charges     Row Name 04/06/21 1155             Time Calculation    Start Time  1116  -WK      Stop Time  1140  -WK      Time Calculation (min)  24 min  -WK      PT Received On  04/06/21  -WK         Time Calculation- PT    Total Timed Code Minutes- PT  24 minute(s)  -WK        User Key  (r) = Recorded By, (t) = Taken By, (c) = Cosigned By    Initials Name Provider Type    WK Carol Preston PTA Physical Therapy Assistant        Therapy Charges for Today     Code Description Service Date Service Provider Modifiers Qty    74222564604 HC GAIT TRAINING EA 15 MIN 4/6/2021 Carol Preston PTA GP 2          PT G-Codes  Outcome Measure Options: AM-PAC 6 Clicks Daily Activity (OT)  AM-PAC 6 Clicks Score (PT): 14  AM-PAC 6 Clicks Score (OT): 12    Carol Preston PTA  4/6/2021

## 2021-04-06 NOTE — PLAN OF CARE
Goal Outcome Evaluation:  Plan of Care Reviewed With: patient, caregiver  Progress: no change (eval)  Outcome Summary: ST speech/language/cognitive evaluation completed. Pt does have visual deficits and unable to see items to name but given semantic cues as well as asking pt to name items she is holding, pt does well. Delay in all responses and occasional cues needed in order to complete a task. Pt able to answer questions appropriately without cues including: yes/no questions, phrase/sentence completion, sentence formulation, divergent/convergent naming, problem solving, and recall. Pt states she is back to baseline status and she lives with boyfriend. Pt spouse helps with medication, finances, and cooking per pt report. No concerns per RN. ST services no longer warranted. MD to reconsult if change or new concern.

## 2021-04-06 NOTE — PROGRESS NOTES
Continued Stay Note   [place]     Patient Name: Mini Gentile  MRN: [id number]  Today's Date: 4/6/2021    Admit Date: 4/3/2021    Discharge Plan     Row Name 04/06/21 0900       Plan    Plan Comments  CLARIFICATION:  AFTER FURTHER INVESTIGATION IT IS NOTED THAT WHEN PT. WAS D/C'D ON 3/28/21 SHE HAD HOME HEALTH ORDERS AND CHOSE [facility] AND SW MADE REFERRAL, HOWEVER PT. WAS NOT ADMITTED HOME HEALTH SERVICES AFTER D/C.    Row Name 04/06/21 0611       Plan    Plan Comments  ATTEMPTED TO CONTACT PT'S SON TO DISCUSS D/C DISPOSITION, HOWEVER HE DID NOT ANSWER AND DOES NOT HAVE VOICEMAIL.  CONTACTED KRISTEN ORDONEZ, [facility] OTHER AND CAREGIVER FOR 20 PLUS YEARS ([phone]) AND HE ADVISED THAT HE WOULD LIKE TO DISCUSS PT'S CURRENT MEDS AS ONE OF THEM HAS MADE PT. HALLUCINATE IN THE PAST; MD AWARE AND WILL CONTACT HIM.  WILL FOLLOW TO MAKE REFERRALS FOR SNF AS SIG OTHER STATES THAT PT. WAS FULLY AMBULATORY AND WITH SOUND MIND PRIOR TO ADMIT.  PT. IS NOT CURRENTLY FOLLOWED BY HOME HEALTH AS DOCUMENTED AND STATED BY PT'S SON.  PT. HAS HAD [facility] HOME [facility]ARE IN THE PAST, PER SIG KRISTEN HERNANDEZ.        Discharge Codes    No documentation.             SACHI Euceda

## 2021-04-06 NOTE — THERAPY TREATMENT NOTE
Acute Care - Speech Language Pathology   Swallow Treatment Note [facility]     Patient Name: Mini Gentile  : 1960  MRN: [id number]  Today's Date: 2021               Admit Date: 4/3/2021  ST tx completed. Pt sitting in bedside chair upon arrival with breakfast tray present but no initiation to feed self. Pt did seem confused when asked to grab call light as well as where her fork was. Difficulty noted feeding self. ST assisted with breakfast. No overt s/s of aspiration observed with PO. Okay to continue with [facility] soft and thin liquids. No increased lung congestion noted per RN. ST to d/c speech goals at this time. ST to follow for speech/language/cognitive eval.  Vonnie Harris, CCC-SLP 2021 08:49 CDT      Visit Dx:     ICD-10-CM ICD-9-CM   1. Altered mental status, unspecified altered mental status type  R41.82 780.97   2. Uremia  N19 586   3. Gastrointestinal hemorrhage, unspecified gastrointestinal hemorrhage type  K92.2 578.9   4. Elevated troponin  R77.8 790.6   5. Congestive heart failure, unspecified HF chronicity, unspecified heart failure type (CMS/[facility])  I50.9 428.0   6. Dysphagia, unspecified type  R13.10 787.20   7. Impaired mobility and ADLs  Z74.09 V49.89    Z78.9    8. 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   • Eustachian tube dysfunction   • Chronic otitis media   • Pneumonia of left upper lobe due to Streptococcus (CMS/[facility])   • End stage renal failure on dialysis (CMS/[facility])   • Diabetes (CMS/[facility])   • Glaucoma   • HTN (hypertension)   • Proteinuria   • Type 2 diabetes mellitus, with long-term current use of insulin (CMS/[facility])   • Anemia due to chronic kidney disease, on chronic dialysis (CMS/[facility])   • Acute pulmonary edema (CMS/[facility])   • Non-compliance with renal dialysis (CMS/[facility])   • Elevated troponin due to ESRD    • Hyperkalemia   • Encephalopathy, metabolic, due to uremia   • High  anion gap metabolic acidosis due to uremia   • Respiratory distress   • Acute diastolic heart failure (CMS/HCC)   • Lymph node enlargement, left axillary measuring 1.1 cm -follow-up for primary care   • Nausea vomiting and diarrhea   • Acute diastolic heart failure (CMS/HCC)   • Diabetes mellitus with ophthalmic complication (CMS/HCC)   • Tobacco abuse   • Urinary tract infection with hematuria   • Pneumonia due to infectious organism   • Abnormal urine findings   • Closed fracture of right proximal humerus   • Hyponatremia   • Hypothyroidism (acquired)   • Forehead laceration secondary to fall   • Heme positive stool     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    • Chronic otitis media    • Diabetes (CMS/HCC)    • End stage renal disease on dialysis (CMS/[facility])    • Eustachian tube dysfunction    • Glaucoma    • HTN (hypertension)    • Mucoid otitis media    • Noncompliance of patient with renal dialysis (CMS/[facility])    • Tobacco abuse      Past Surgical History:   Procedure Laterality Date   • ARTERIOVENOUS FISTULA     • CATARACT EXTRACTION WITH INTRAOCULAR LENS IMPLANT     •  SECTION     • CHOLECYSTECTOMY     • MYRINGOTOMY W/ TUBES Bilateral    • MYRINGOTOMY W/ TUBES Right    • TUBAL ABDOMINAL LIGATION          SWALLOW EVALUATION (last 72 hours)      SLP Adult Swallow Evaluation     Row Name 21 0826 21 0943                Rehab Evaluation    Document Type  discharge treatment  -BN  evaluation  -MM (r) SW (t) MM (c)       Subjective Information  complains of;pain  -BN  no complaints  -MM (r) SW (t) MM (c)       Patient Observations  alert;cooperative  -BN  alert;cooperative;agree to therapy  -MM (r) SW (t) MM (c)       Patient/Family/Caregiver Comments/Observations  no family present  -BN  no family present  -MM (r) SW (t) MM (c)       Care Plan Review  patient/other agree to care plan  -BN  evaluation/treatment results reviewed  -MM (r) SW (t) MM  (c)       Patient Effort  good  -BN  good  -MM (r) SW (t) MM (c)       Symptoms Noted During/After Treatment  --  none  -MM          General Information    Patient Profile Reviewed  --  yes  -MM (r) SW (t) MM (c)       Pertinent History Of Current Problem  --  Primary problem: Altered mental status, CXR with R pleural effusion. Medical history: End stage renal disease requiring HD, diabetes, hypertension, COPD, chronic kidney disease, eustachian tube dysfunction, glaucoma. Recent hospitalization s/p fall and proximal humeral neck fx.   -MM       Current Method of Nutrition  --  NPO  -MM       Precautions/Limitations, Vision  --  WFL;for purposes of eval  -MM (r) SW (t) MM (c)       Precautions/Limitations, Hearing  --  WFL;for purposes of eval  -MM (r) SW (t) MM (c)       Prior Level of Function-Communication  --  unknown  -MM (r) SW (t) MM (c)       Prior Level of Function-Swallowing  --  no diet consistency restrictions  -MM (r) SW (t) MM (c)       Plans/Goals Discussed with  --  patient  -MM (r) SW (t) MM (c)       Barriers to Rehab  --  none identified  -MM (r) SW (t) MM (c)       Patient's Goals for Discharge  --  patient did not state Wanted coffee  -MM          Pain    Additional Documentation  Pain Scale: FACES Pre/Post-Treatment (Group)  -BN  Pain Scale: FACES Pre/Post-Treatment (Group)  -MM (r) SW (t) MM (c)          Pain Scale: FACES Pre/Post-Treatment    Pain: FACES Scale, Pretreatment  2-->hurts little bit  -BN  0-->no hurt  -MM (r) SW (t) MM (c)       Posttreatment Pain Rating  2-->hurts little bit  -BN  0-->no hurt  -MM (r) SW (t) MM (c)       Pre/Posttreatment Pain Comment  buttocks/RN aware  -BN  --          Oral Motor Structure and Function    Dentition Assessment  --  has dentures but unable to use them to poor fit/pain  -MM       Secretion Management  --  WNL/WFL  -MM (r) SW (t) MM (c)       Mucosal Quality  --  moist, healthy  -MM (r) SW (t) MM (c)       Volitional Swallow  --  WFL  -MM (r) SW  (t) MM (c)          Oral Musculature and Cranial Nerve Assessment    Oral Motor General Assessment  --  WFL  -MM (r) SW (t) MM (c)          General Eating/Swallowing Observations    Eating/Swallowing Skills  --  fed by SLP  -MM (r) SW (t) MM (c)       Positioning During Eating  --  semi-reclined  -MM (r) SW (t) MM (c)       Utensils Used  --  straw;spoon  -MM       Consistencies Trialed  --  pudding thick;honey-thick liquids;nectar/syrup-thick liquids;thin liquids;regular textures  -MM (r) SW (t) MM (c)          Clinical Swallow Eval    Oral Prep Phase  --  WFL  -MM (r) SW (t) MM (c)       Oral Transit  --  WFL  -MM (r) SW (t) MM (c)       Oral Residue  --  WFL  -MM (r) SW (t) MM (c)       Pharyngeal Phase  --  WFL  -MM (r) SW (t) MM (c)       Esophageal Phase  --  unremarkable  -MM (r) SW (t) MM (c)          Clinical Impression    Daily Summary of Progress (SLP)  progress toward functional goals is good  -BN  --  -MM       Barriers to Overall Progress (SLP)  n/a  -BN  n/a  -MM (r) SW (t) MM (c)       SLP Swallowing Diagnosis  --  R/O pharyngeal dysphagia  -MM       Functional Impact  --  risk of aspiration/pneumonia  -MM       Rehab Potential/Prognosis, Swallowing  --  good, to achieve stated therapy goals  -MM (r) SW (t) MM (c)       Swallow Criteria for Skilled Therapeutic Interventions Met  --  demonstrates skilled criteria  -MM       Plan for Continued Treatment (SLP)  d/c swallow goals  -BN  --          Recommendations    Therapy Frequency (Swallow)  --  PRN  -MM (r) SW (t) MM (c)       Predicted Duration Therapy Intervention (Days)  --  2 days  -MM (r) SW (t) MM (c)       SLP Diet Recommendation  --  soft textures;thin liquids  -MM (r) SW (t) MM (c)       Recommended Diagnostics  --  SLE/Cog/Motor Speech Evaluation If not back to baseline  -MM       Recommended Precautions and Strategies  --  upright posture during/after eating;small bites of food and sips of liquid;general aspiration precautions  -MM        Oral Care Recommendations  --  Oral Care BID/PRN;Toothbrush  -MM       SLP Rec. for Method of Medication Administration  --  meds whole;with thin liquids  -MM (r) SW (t) MM (c)       Monitor for Signs of Aspiration  --  yes;notify SLP if any concerns  -MM (r) SW (t) MM (c)       Anticipated Discharge Disposition (SLP)  --  unknown  -MM (r) SW (t) MM (c)          Swallow Goals (SLP)    Oral Nutrition/Hydration Goal Selection (SLP)  oral nutrition/hydration, SLP goal 1  -BN  oral nutrition/hydration, SLP goal 1  -MM (r) SW (t) MM (c)          Oral Nutrition/Hydration Goal 1 (SLP)    Oral Nutrition/Hydration Goal 1, SLP  Patient will tolerate LRD without s/s of aspiration  -BN  Patient will tolerate LRD without s/s of aspiration  -MM (r) SW (t) MM (c)       Time Frame (Oral Nutrition/Hydration Goal 1, SLP)  by discharge  -BN  by discharge  -MM (r) SW (t) MM (c)       Barriers (Oral Nutrition/Hydration Goal 1, SLP)  none  -BN  none  -MM       Progress/Outcomes (Oral Nutrition/Hydration Goal 1, SLP)  goal met  -BN  goal ongoing  -MM (r) SW (t) MM (c)         User Key  (r) = Recorded By, (t) = Taken By, (c) = Cosigned By    Initials Name Effective Dates    Vonnie Taveras, CCC-SLP 02/11/20 -     Maia Garcia, MS CCC-SLP 07/12/20 -     Felipa Angeles, Speech Therapy Student 01/18/21 -           EDUCATION  The patient has been educated in the following areas:   Dysphagia (Swallowing Impairment) Modified Diet Instruction.    SLP Recommendation and Plan                                         Daily Summary of Progress (SLP): progress toward functional goals is good    Plan for Continued Treatment (SLP): d/c swallow goals              Plan of Care Reviewed With: patient  Progress: improving  Outcome Summary: ST tx completed. Pt sitting in bedside chair upon arrival with breakfast tray present but no initiation to feed self. Pt did seem confused when asked to grab call light as well as where her fork was.  Difficulty noted feeding self. ST assisted with breakfast. No overt s/s of aspiration observed with PO. Okay to continue with [facility] soft and thin liquids. No increased lung congestion noted per RN. ST to d/c speech goals at this time. ST to follow for speech/language/cognitive eval.    SLP GOALS     Row Name 04/06/21 0826 04/05/21 0943          Oral Nutrition/Hydration Goal 1 (SLP)    Oral Nutrition/Hydration Goal 1, SLP  Patient will tolerate LRD without s/s of aspiration  -BN  Patient will tolerate LRD without s/s of aspiration  -MM (r) SW (t) MM (c)     Time Frame (Oral Nutrition/Hydration Goal 1, SLP)  by discharge  -BN  by discharge  -MM (r) SW (t) MM (c)     Barriers (Oral Nutrition/Hydration Goal 1, SLP)  none  -BN  none  -MM     Progress/Outcomes (Oral Nutrition/Hydration Goal 1, SLP)  goal met  -BN  goal ongoing  -MM (r) SW (t) MM (c)       User Key  (r) = Recorded By, (t) = Taken By, (c) = Cosigned By    Initials Name Provider Type    Vonnie Taveras CCC-SLP Speech and Language Pathologist    Maia Garcia, MS CCC-SLP Speech and Language Pathologist    Felipa Angeles, Speech Therapy Student Speech Therapy Student             Time Calculation:   Time Calculation- SLP     Row Name 04/06/21 0847             Time Calculation- SLP    SLP Start Time  0826  -BN      SLP Stop Time  0849  -      SLP Time Calculation (min)  23 min  -BN      SLP Received On  04/06/21  -        User Key  (r) = Recorded By, (t) = Taken By, (c) = Cosigned By    Initials Name Provider Type    Vonnie Taveras CCC-SLP Speech and Language Pathologist          Therapy Charges for Today     Code Description Service Date Service Provider Modifiers Qty    [id number]  ST TREATMENT SWALLOW 2 4/6/2021 Vonnie Harris CCC-SLP GN 1               BERNY Ogden  4/6/2021

## 2021-04-06 NOTE — PLAN OF CARE
Goal Outcome Evaluation:  Plan of Care Reviewed With: patient  Progress: improving  Outcome Summary: ST tx completed. Pt sitting in bedside chair upon arrival with breakfast tray present but no initiation to feed self. Pt did seem confused when asked to grab call light as well as where her fork was. Difficulty noted feeding self. ST assisted with breakfast. No overt s/s of aspiration observed with PO. Okay to continue with mech soft and thin liquids. No increased lung congestion noted per RN. ST to d/c speech goals at this time. ST to follow for speech/language/cognitive eval.

## 2021-04-06 NOTE — PLAN OF CARE
Problem: Adult Inpatient Plan of Care  Goal: Plan of Care Review  Recent Flowsheet Documentation  Taken 4/6/2021 1116 by Carol Preston, DAWN  Progress: improving  Plan of Care Reviewed With: patient  Outcome Summary: Pt amb 10' sitting rest and then 30' with CGA-Gwen with HHA.  Pt had 2 LOB requiring min A to correct.  pt also required verbal cues to avoid objects.

## 2021-04-06 NOTE — PROGRESS NOTES
PROGRESS NOTE.      Patient:  Mini Gentile  YOB: 1960  Date of Service: 4/6/2021  MRN: [id number]   Acct: [id number]   Primary Care Physician: Bharati Dahl APRN  Advance Directive:   Code Status and Medical Interventions:   Ordered at: 04/04/21 0213     Code Status:    CPR     Medical Interventions (Level of Support Prior to Arrest):    Full     Admit Date: 4/3/2021       Hospital Day: 2  Referring Provider: No ref. provider found      Patient Seen, Chart, Consults, Notes, Labs, Radiology studies reviewed.      Subjective:  Mini Gentile is a 61 y.o. female  whom we were consulted for end-stage renal disease.  She goes to [facility] on Tuesday Thursday Saturday.  Apparently she was not sure yesterday.  She has been noncompliant lately.  Presented as she was brought in by family as she was found to be confused, disoriented.  She has been on Percocet for pain control for right humeral neck fracture.  Patient has type 2 diabetes, hypertension, COPD, tobacco use and anemia of chronic kidney disease. She was initially admitted to ICU. She was sleepy/confused/encephalopathy.  Patient received dialysis that was well-tolerated. She has since moved to the medical floor.  Today, she appeared to be comfortable with no new complaints.    Allergies:  Bupropion, Chantix [varenicline], Sulfa antibiotics, Azithromycin, Levofloxacin, and Penicillins    Home Meds:  Medications Prior to Admission   Medication Sig Dispense Refill Last Dose   • traMADol (ULTRAM) 50 MG tablet Take 50 mg by mouth Every 8 (Eight) Hours As Needed for Moderate Pain .      • albuterol sulfate  (90 Base) MCG/ACT inhaler Inhale 2 puffs Every 4 (Four) Hours As Needed for Wheezing. 18 g 0 Unknown at Unknown time   • aspirin 81 MG EC tablet Take 81 mg by mouth Daily.   Unknown at Unknown time   • atropine 1 % ophthalmic solution Administer 1 drop into the left eye Daily.   Unknown at Unknown time   • brimonidine  (ALPHAGAN) 0.2 % ophthalmic solution Administer 1 drop into the left eye 3 (Three) Times a Day.   Unknown at Unknown time   • carvedilol (COREG) 6.25 MG tablet Take 6.25 mg by mouth 2 (Two) Times a Day With Meals.   Unknown at Unknown time   • cholecalciferol (VITAMIN D3) 1000 units tablet Take 2,000 Units by mouth Daily.   Unknown at Unknown time   • dorzolamide (TRUSOPT) 2 % ophthalmic solution Administer 1 drop into the left eye 2 (Two) Times a Day.   Unknown at Unknown time   • famotidine (PEPCID) 20 MG tablet Take 1 tablet by mouth 2 (Two) Times a Day. 60 tablet 2 Unknown at Unknown time   • fluticasone (FLONASE) 50 MCG/ACT nasal spray 2 sprays into the nostril(s) as directed by provider 2 (Two) Times a Day.   Unknown at Unknown time   • insulin glargine (LANTUS) 100 UNIT/ML injection Inject 10 Units under the skin Every Night.   Unknown at Unknown time   • ipratropium (ATROVENT) 0.02 % nebulizer solution Take 500 mcg by nebulization Every 4 (Four) Hours As Needed for Wheezing or Shortness of Air.   Unknown at Unknown time   • lactobacillus acidophilus (RISAQUAD) capsule capsule Take 1 capsule by mouth Daily. 30 capsule 2 Unknown at Unknown time   • Latanoprostene Bunod (Vyzulta) 0.024 % solution Administer 1 drop into the left eye Every Night. Left eye    Unknown at Unknown time   • levothyroxine (SYNTHROID, LEVOTHROID) 25 MCG tablet Take 1 tablet by mouth Daily. 30 tablet 0 Unknown at Unknown time   • losartan (COZAAR) 50 MG tablet Take 50 mg by mouth 2 (Two) Times a Day.   Unknown at Unknown time   • Netarsudil Dimesylate (RHOPRESSA) 0.02 % solution Administer 1 drop into the left eye Every Night.   Unknown at Unknown time   • nicotine (NICODERM CQ) 21 MG/24HR patch Place 1 patch on the skin as directed by provider Daily. 30 patch 2 Unknown at Unknown time   • ondansetron (ZOFRAN) 4 MG tablet Take 1 tablet by mouth Every 6 (Six) Hours As Needed for Nausea or Vomiting. 24 tablet 2 Unknown at Unknown time   •  oxyCODONE-acetaminophen (PERCOCET) 7.5-325 MG per tablet Take 1 tablet by mouth Every 6 (Six) Hours As Needed for Severe Pain . 12 tablet 0 Unknown at Unknown time   • pravastatin (PRAVACHOL) 10 MG tablet Take 10 mg by mouth Every Night.   Unknown at Unknown time   • pregabalin (LYRICA) 50 MG capsule Take 50 mg by mouth 2 (Two) Times a Day.   Unknown at Unknown time   • sertraline (ZOLOFT) 25 MG tablet Take 25 mg by mouth Daily.   Unknown at Unknown time   • sevelamer (RENAGEL) 800 MG tablet Take 2,400 mg by mouth 3 (Three) Times a Day With Meals.   Unknown at Unknown time   • timolol (TIMOPTIC) 0.5 % ophthalmic solution Administer 1 drop into the left eye Every Morning.          Medicines:  Current Facility-Administered Medications   Medication Dose Route Frequency Provider Last Rate Last Admin   • acetaminophen (TYLENOL) tablet 650 mg  650 mg Oral Q4H PRN Fuentes Cartagena DO        Or   • acetaminophen (TYLENOL) suppository 650 mg  650 mg Rectal Q4H PRN Fuentes Cartagena DO       • atropine 1 % ophthalmic solution 1 drop  1 drop Left Eye Daily Fuentes Cartagena DO   1 drop at 04/06/21 0950   • brimonidine (ALPHAGAN) 0.2 % ophthalmic solution 1 drop  1 drop Left Eye TID Fuentes Cartagena DO   1 drop at 04/06/21 0813   • dextrose (D50W) 25 g/ 50mL Intravenous Solution 25 g  25 g Intravenous Q15 Min PRN Fuentes Cartagena DO   25 g at 04/04/21 1106   • dextrose (GLUTOSE) oral gel 15 g  15 g Oral Q15 Min PRN Fuentes Cartagena DO       • dorzolamide (TRUSOPT) 2 % ophthalmic solution 1 drop  1 drop Left Eye BID Fuentes Cartagena DO   1 drop at 04/06/21 0813   • fluticasone (FLONASE) 50 MCG/ACT nasal spray 2 spray  2 spray Nasal BID Fuentes Cartagena DO   2 spray at 04/06/21 0813   • glucagon (human recombinant) (GLUCAGEN DIAGNOSTIC) injection 1 mg  1 mg Subcutaneous Q15 Min PRN Fuentes Cartagena DO       • insulin detemir (LEVEMIR) injection 10 Units  10 Units Subcutaneous Nightly Fuentes Cartagena, DO       • insulin lispro  (humaLOG) injection 0-9 Units  0-9 Units Subcutaneous TID AC Fuentes Cartagena DO   4 Units at 04/06/21 1127   • ipratropium (ATROVENT) nebulizer solution 0.5 mg  500 mcg Nebulization Q4H PRN Fuentes Cartagena DO       • ipratropium-albuterol (DUO-NEB) nebulizer solution 3 mL  3 mL Nebulization Q6H PRN Fuentes Cartagena DO       • latanoprost (XALATAN) 0.005 % ophthalmic solution 1 drop  1 drop Left Eye Nightly Fuentes Cartagena DO   1 drop at 04/05/21 2151   • levothyroxine (SYNTHROID, LEVOTHROID) tablet 25 mcg  25 mcg Oral Q AM Fuentes Cartagena DO   25 mcg at 04/06/21 0550   • nicotine (NICODERM CQ) 21 MG/24HR patch 1 patch  1 patch Transdermal Q AM Fuentes Cartagena DO   1 patch at 04/06/21 0550   • oxyCODONE-acetaminophen (PERCOCET) 7.5-325 MG per tablet 1 tablet  1 tablet Oral Q6H PRN Fuentes Cartagena DO       • pantoprazole (PROTONIX) EC tablet 40 mg  40 mg Oral Q AM Fuentes Cartagena DO   40 mg at 04/06/21 0550   • pravastatin (PRAVACHOL) tablet 10 mg  10 mg Oral Nightly Fuentes Cartagena DO   10 mg at 04/05/21 2149   • pregabalin (LYRICA) capsule 50 mg  50 mg Oral BID Fuentes Cartagena DO   50 mg at 04/06/21 0950   • sertraline (ZOLOFT) tablet 25 mg  25 mg Oral Daily Fuentes Cartagena DO   25 mg at 04/06/21 0811   • sevelamer (RENVELA) tablet 2,400 mg  2,400 mg Oral TID With Meals Fuentes Cartagena DO   2,400 mg at 04/06/21 1102   • sodium chloride 0.9 % flush 10 mL  10 mL Intravenous Q12H Fuentes Cartagena DO   10 mL at 04/05/21 2151   • sodium chloride 0.9 % flush 10 mL  10 mL Intravenous PRN Cartagena, Fuentes C, DO       • timolol (TIMOPTIC) 0.5 % ophthalmic solution 1 drop  1 drop Left Eye Q AM Fuentes Cartagena,    1 drop at 04/06/21 0545       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    • Chronic otitis media    • Diabetes (CMS/HCC)    • End stage renal disease on dialysis (CMS/[facility])    • Eustachian tube dysfunction    • Glaucoma    • HTN  "(hypertension)    • Mucoid otitis media    • Noncompliance of patient with renal dialysis (CMS/[facility])    • Tobacco abuse        Past Surgical History:  Past Surgical History:   Procedure Laterality Date   • ARTERIOVENOUS FISTULA     • CATARACT EXTRACTION WITH INTRAOCULAR LENS IMPLANT     •  SECTION     • CHOLECYSTECTOMY     • MYRINGOTOMY W/ TUBES Bilateral    • MYRINGOTOMY W/ TUBES Right    • TUBAL ABDOMINAL LIGATION         Family History  Family History   Problem Relation Age of Onset   • Heart disease Mother    • Diabetes Father        Social History  Social History     Socioeconomic History   • Marital status: Single     Spouse name: Not on file   • Number of children: Not on file   • Years of education: Not on file   • Highest education level: Not on file   Tobacco Use   • Smoking status: Current Every Day Smoker     Packs/day: 2.00     Years: 6.00     Pack years: 12.00     Types: Cigarettes   • Smokeless tobacco: Never Used   Substance and Sexual Activity   • Alcohol use: No   • Drug use: No   • Sexual activity: Defer       Review of Systems:  History obtained from chart review and the patient  General ROS: No fever or chills  Respiratory ROS: No cough, +shortness of breath,- wheezing  Cardiovascular ROS: no chest pain but dyspnea on exertion  Gastrointestinal ROS: No abdominal pain or melena  Genito-Urinary ROS: No dysuria or hematuria  Musculoskeletal: negative  Skin: negative    Objective:  BP 94/62 (BP Location: Right leg, Patient Position: Sitting)   Pulse 80   Temp 99.4 °F (37.4 °C) (Oral)   Resp 16   Ht 165.1 cm (65\")   Wt 59.1 kg (130 lb 6 oz)   SpO2 92%   BMI 21.70 kg/m²     Intake/Output Summary (Last 24 hours) at 2021 1423  Last data filed at 2021 1018  Gross per 24 hour   Intake 140 ml   Output --   Net 140 ml       Physical examination:  General: awake/alert   Chest:eased breath sound on both lung field.  CVS: regular rate and rhythm  Abdominal: soft, nontender, normal bowel " sounds  Extremities: no cyanosis or edema  Skin: warm and dry without rash  Neuro: No focal motor deficits    Labs:  Lab Results (last 24 hours)     Procedure Component Value Units Date/Time    POC Glucose Once [[id number]]  (Abnormal) Collected: 04/06/21 1110    Specimen: Blood Updated: 04/06/21 1131     Glucose 207 mg/dL      Comment: : [id number] Yg (Ramin) KaylieMeter ID: [id number]       POC Glucose Once [[id number]]  (Abnormal) Collected: 04/06/21 0715    Specimen: Blood Updated: 04/06/21 0726     Glucose 197 mg/dL      Comment: : [id number] Yg (Faustin) KaylieMeter ID: [id number]       Basic Metabolic Panel [[id number]]  (Abnormal) Collected: 04/06/21 0343    Specimen: Blood Updated: 04/06/21 0427     Glucose 215 mg/dL      BUN 21 mg/dL      Creatinine 2.69 mg/dL      Sodium 137 mmol/L      Potassium 4.1 mmol/L      Chloride 99 mmol/L      CO2 25.0 mmol/L      Calcium 9.2 mg/dL      eGFR Non African Amer 18 mL/min/1.73      BUN/Creatinine Ratio 7.8     Anion Gap 13.0 mmol/L     Narrative:      GFR Normal >60  Chronic Kidney Disease <60  Kidney Failure <15      CBC (No Diff) [[id number]]  (Abnormal) Collected: 04/06/21 0343    Specimen: Blood Updated: 04/06/21 0412     WBC 3.99 10*3/mm3      RBC 2.97 10*6/mm3      Hemoglobin 9.1 g/dL      Hematocrit 28.4 %      MCV 95.6 fL      MCH 30.6 pg      MCHC 32.0 g/dL      RDW 16.0 %      RDW-SD 56.2 fl      MPV 9.1 fL      Platelets 151 10*3/mm3           Radiology:   Imaging Results (Last 24 Hours)     ** No results found for the last 24 hours. **              Assessment   1.  End-stage renal disease on maintenance dialysis.  2.  Type II diabetic nephropathy.  3.  Noncompliant dialysis patient.  4.  Metabolic encephalopathy.  5.  Anemia of chronic kidney disease.  6.  Recent right humeral fracture.    Plan:  Continue dialysis thrice weekly.  Follow-up labs.  Patient was encouraged to be more compliant.    Ten Boyd MD  4/6/2021  14:23  CDT

## 2021-04-06 NOTE — PLAN OF CARE
Problem: Adult Inpatient Plan of Care  Goal: Plan of Care Review  Outcome: Ongoing, Progressing  Flowsheets (Taken 4/6/2021 5217)  Progress: improving  Plan of Care Reviewed With: patient  Outcome Summary: Pt. sitting in chair with sling on Rue! This hood/l provided food prep and liquids to mouth and scooping food for pt. to feed self! Built up handle for utensils provided!   Goal Outcome Evaluation:  Plan of Care Reviewed With: patient  Progress: improving  Outcome Summary: Pt. sitting in chair with sling on Rue! This hood/l provided food prep and liquids to mouth and scooping food for pt. to feed self! Built up handle for utensils provided!

## 2021-04-06 NOTE — PROGRESS NOTES
Continued Stay Note   Db     Patient Name: Mini Gentile  MRN: [id number]  Today's Date: 4/6/2021    Admit Date: 4/3/2021    Discharge Plan     Row Name 04/06/21 0917       Plan    Plan Comments  ATTEMPTED TO CONTACT PT'S SON TO DISCUSS D/C DISPOSITION, HOWEVER HE DID NOT ANSWER AND DOES NOT HAVE VOICEMAIL.  CONTACTED KRISTEN ORDONEZ, [facility] OTHER AND CAREGIVER FOR 20 PLUS YEARS ([phone]) AND HE ADVISED THAT HE WOULD LIKE TO DISCUSS PT'S CURRENT MEDS AS ONE OF THEM HAS MADE PT. HALLUCINATE IN THE PAST; MD AWARE AND WILL CONTACT HIM.  WILL FOLLOW TO MAKE REFERRALS FOR SNF AS SIG OTHER STATES THAT PT. WAS FULLY AMBULATORY AND WITH SOUND MIND PRIOR TO ADMIT.  PT. IS NOT CURRENTLY FOLLOWED BY HOME HEALTH AS DOCUMENTED AND STATED BY PT'S SON.  PT. HAS HAD [facility] IN THE PAST, PER SIG KRISTEN HERNANDEZ.        Discharge Codes    No documentation.             SACHI uEceda

## 2021-04-06 NOTE — PLAN OF CARE
Goal Outcome Evaluation:  Plan of Care Reviewed With: patient  Progress: no change  Outcome Summary: VSS. Pt is having black/tarry stools today. MD notified, no new orders. Working with PT/OT. Pt up in chair for most of the day, waffle cushion in place. Zgaurd applied. C/o pain in bottom, offered Percocet but pt refused. Repositioned for better comfort. Safety maintained.

## 2021-04-07 NOTE — THERAPY TREATMENT NOTE
Acute Care - Occupational Therapy Treatment Note  [facility]     Patient Name: Mini Gentile  : 1960  MRN: [id number]  Today's Date: 2021             Admit Date: 4/3/2021       ICD-10-CM ICD-9-CM   1. Altered mental status, unspecified altered mental status type  R41.82 780.97   2. Uremia  N19 586   3. Gastrointestinal hemorrhage, unspecified gastrointestinal hemorrhage type  K92.2 578.9   4. Elevated troponin  R77.8 790.6   5. Congestive heart failure, unspecified HF chronicity, unspecified heart failure type (CMS/[facility])  I50.9 428.0   6. Dysphagia, unspecified type  R13.10 787.20   7. Impaired mobility and ADLs  Z74.09 V49.89    Z78.9    8. Impaired mobility  Z74.09 799.89   9. 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   • Eustachian tube dysfunction   • Chronic otitis media   • Pneumonia of left upper lobe due to Streptococcus (CMS/[facility])   • End stage renal failure on dialysis (CMS/[facility])   • Diabetes (CMS/[facility])   • Glaucoma   • HTN (hypertension)   • Proteinuria   • Type 2 diabetes mellitus, with long-term current use of insulin (CMS/[facility])   • Anemia due to chronic kidney disease, on chronic dialysis (CMS/[facility])   • Acute pulmonary edema (CMS/[facility])   • Non-compliance with renal dialysis (CMS/[facility])   • Elevated troponin due to ESRD    • Hyperkalemia   • Encephalopathy, metabolic, due to uremia   • High anion gap metabolic acidosis due to uremia   • Respiratory distress   • Acute diastolic heart failure (CMS/[facility])   • Lymph node enlargement, left axillary measuring 1.1 cm -follow-up for primary care   • Nausea vomiting and diarrhea   • Acute diastolic heart failure (CMS/[facility])   • Diabetes mellitus with ophthalmic complication (CMS/[facility])   • Tobacco abuse   • Urinary tract infection with hematuria   • Pneumonia due to infectious organism   • Abnormal urine findings   • Closed fracture of right  proximal humerus   • Hyponatremia   • Hypothyroidism (acquired)   • Forehead laceration secondary to fall   • Heme positive stool     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    • Chronic otitis media    • Diabetes (CMS/HCC)    • End stage renal disease on dialysis (CMS/HCC)    • Eustachian tube dysfunction    • Glaucoma    • HTN (hypertension)    • Mucoid otitis media    • Noncompliance of patient with renal dialysis (CMS/HCC)    • Tobacco abuse      Past Surgical History:   Procedure Laterality Date   • ARTERIOVENOUS FISTULA     • CATARACT EXTRACTION WITH INTRAOCULAR LENS IMPLANT     •  SECTION     • CHOLECYSTECTOMY     • MYRINGOTOMY W/ TUBES Bilateral    • MYRINGOTOMY W/ TUBES Right    • TUBAL ABDOMINAL LIGATION              OT ASSESSMENT FLOWSHEET (last 12 hours)      OT Evaluation and Treatment     Row Name 21 0801                   OT Time and Intention    Subjective Information  pain;complains of  -        Document Type  therapy note (daily note)  -CJ        Mode of Treatment  occupational therapy  -CJ        Patient Effort  good  -CJ           General Information    Existing Precautions/Restrictions  brace worn when out of bed;fall;non-weight bearing RUE!  -        Limitations/Impairments  visual  -CJ           Pain Assessment    Additional Documentation  Pain Scale: Word Pre/Post-Treatment (Group)  -           Pain Scale: Numbers Pre/Post-Treatment    Pain Intervention(s)  Repositioned;Rest  -CJ           Pain Scale: Word Pre/Post-Treatment    Pain: Word Scale, Pretreatment  2 - mild pain  -        Posttreatment Pain Rating  2 - mild pain  -CJ        Pain Location - Side  Right  -CJ        Pain Location - Orientation  upper  -CJ        Pain Location  shoulder  -           Bed Mobility    Comment (Bed Mobility)  sitting in chair!  -           Activities of Daily Living    47685 - OT Self Care/Mgmt Minutes  39  -        BADL  Assessment/Intervention  feeding  -           Self-Feeding Assessment/Training    [place] Level (Feeding)  feeding skills;prepare tray/open items;set up;contact guard assist;minimum assist (75% patient effort)  -        Assistive Devices (Feeding)  built-up handle utensils  -        Position (Self-Feeding)  supported sitting  -           Wound 04/04/21 0142 coccyx    Wound - Properties Group Placement Date: 04/04/21  -VT Placement Time: 0142 -VT Location: coccyx  -VT    Retired Wound - Properties Group Date first assessed: 04/04/21  -VT Time first assessed: 0142 -VT Location: coccyx  -VT       Positioning and Restraints    Pre-Treatment Position  sitting in chair/recliner  -CJ        Post Treatment Position  chair  -CJ        In Chair  sitting;call light within reach;encouraged to call for assist  -           Progress Summary (OT)    Progress Toward Functional Goals (OT)  progress toward functional goals is good  -        Barriers to Overall Progress (OT)  Fall/NWB RUE/sling on when oob/pain!  -        Impairments Still Limiting Function (OT)  continue with ot poc!  -          User Key  (r) = Recorded By, (t) = Taken By, (c) = Cosigned By    Initials Name Effective Dates     Jacky Zhang COTA/L 08/02/16 -     VT Alyx Watson RN 08/02/16 -          Occupational Therapy Education                 Title: PT OT SLP Therapies (Not Started)     Topic: Occupational Therapy (In Progress)     Point: ADL training (Done)     Description:   Instruct learner(s) on proper safety adaptation and remediation techniques during self care or transfers.   Instruct in proper use of assistive devices.              Learning Progress Summary           Patient Acceptance, E,TB, VU,DU,NR by  at 4/7/2021 0801    Comment: Pt. sitting in chair, with this hood/ls' assist able to feed self!    Acceptance, E,TB, VU,DU,NR by  at 4/6/2021 0757    Comment: Pt. required assist with self feeding!     Acceptance, E, NR by TSERING at 4/5/2021 1422                   Point: Home exercise program (Not Started)     Description:   Instruct learner(s) on appropriate technique for monitoring, assisting and/or progressing therapeutic exercises/activities.              Learner Progress:  Not documented in this visit.          Point: Precautions (Done)     Description:   Instruct learner(s) on prescribed precautions during self-care and functional transfers.              Learning Progress Summary           Patient Acceptance, E,TB, VU,DU,NR by  at 4/7/2021 0801    Comment: Pt. sitting in chair, with this hood/ls' assist able to feed self!    Acceptance, E,TB, VU,DU,NR by  at 4/6/2021 0757    Comment: Pt. required assist with self feeding!    Acceptance, E, NR by TSERING at 4/5/2021 1422                   Point: Body mechanics (Done)     Description:   Instruct learner(s) on proper positioning and spine alignment during self-care, functional mobility activities and/or exercises.              Learning Progress Summary           Patient Acceptance, E,TB, VU,DU,NR by  at 4/7/2021 0801    Comment: Pt. sitting in chair, with this hood/ls' assist able to feed self!    Acceptance, E,TB, VU,DU,NR by  at 4/6/2021 0757    Comment: Pt. required assist with self feeding!    Acceptance, E, NR by  at 4/5/2021 1422                               User Key     Initials Effective Dates Name Provider Type Discipline     08/02/16 -  Jacky Zhang HOOD/L Occupational Therapy Assistant OT     12/04/20 -  Kami Chapman OTR/L Occupational Therapist OT                  OT Recommendation and Plan     Progress Toward Functional Goals (OT): progress toward functional goals is good  Plan of Care Review  Plan of Care Reviewed With: patient  Progress: improving  Outcome Summary: Pt. sitting in chair, after set-up and assist with food prep pt. able to feed self using built-up handled utensil! Pt. has issues with vision, so vc's and some  tactile cues required!  Plan of Care Reviewed With: patient  Outcome Summary: Pt. sitting in chair, after set-up and assist with food prep pt. able to feed self using built-up handled utensil! Pt. has issues with vision, so vc's and some tactile cues required!    Outcome Measures     Row Name 04/07/21 0801 04/06/21 0757          How much help from another is currently needed...    Putting on and taking off regular lower body clothing?  2  -CJ  2  -CJ     Bathing (including washing, rinsing, and drying)  2  -CJ  2  -CJ     Toileting (which includes using toilet bed pan or urinal)  2  -CJ  2  -CJ     Putting on and taking off regular upper body clothing  2  -CJ  2  -CJ     Taking care of personal grooming (such as brushing teeth)  2  -CJ  2  -CJ     Eating meals  3  -CJ  2  -CJ     AM-PAC 6 Clicks Score (OT)  13  -  12  -        Functional Assessment    Outcome Measure Options  AM-PAC 6 Clicks Daily Activity (OT)  -  AM-PAC 6 Clicks Daily Activity (OT)  -       User Key  (r) = Recorded By, (t) = Taken By, (c) = Cosigned By    Initials Name Provider Type     Jacky Zhang COTA/L Occupational Therapy Assistant          Time Calculation:   Time Calculation- OT     Row Name 04/07/21 0801             Time Calculation- OT    OT Start Time  0801  -      OT Stop Time  0840  -      OT Time Calculation (min)  39 min  -      Total Timed Code Minutes- OT  39 minute(s)  -      TCU Minutes- OT  39 min  -      OT Received On  04/07/21  -         Timed Charges    34508 - OT Self Care/Mgmt Minutes  39  -        User Key  (r) = Recorded By, (t) = Taken By, (c) = Cosigned By    Initials Name Provider Type     Jacky Zhang GENTILE/L Occupational Therapy Assistant        Therapy Charges for Today     Code Description Service Date Service Provider Modifiers Qty    84025370625 HC OT SELF CARE/MGMT/TRAIN EA 15 MIN 4/6/2021 Jacky Zhang COTA/L GO 3    98113547620 HC OT SELF CARE/MGMT/TRAIN EA 15 MIN  4/7/2021 Jacky Zhang COTA/L  3               TAMIKO Alvarez  4/7/2021

## 2021-04-07 NOTE — THERAPY TREATMENT NOTE
Acute Care - Physical Therapy Treatment Note  [facility]     Patient Name: Mini Gentile  : 1960  MRN: [id number]  Today's Date: 2021           PT Assessment (last 12 hours)      PT Evaluation and Treatment     Row Name 21 0855          Physical Therapy Time and Intention    Subjective Information  no complaints  -TB     Document Type  therapy note (daily note)  -TB     Mode of Treatment  physical therapy  -TB     Patient Effort  good  -TB     Row Name 21 0855          General Information    Existing Precautions/Restrictions  non-weight bearing;right;other (see comments) NWB RUE  -TB     Row Name 21 0855          Bed Mobility    Bed Mobility  rolling left;scooting/bridging;sit-supine  -TB     Rolling Left [place] (Bed Mobility)  contact guard;verbal cues  -TB     Scooting/Bridging [place] (Bed Mobility)  contact guard;verbal cues  -TB     Sit-Supine [place] (Bed Mobility)  minimum assist (75% patient effort);verbal cues  -TB     Assistive Device (Bed Mobility)  bed rails;head of bed elevated  -TB     Row Name 21 0855          Transfers    Transfers  sit-stand transfer;stand-sit transfer  -TB     Sit-Stand [place] (Transfers)  contact guard;verbal cues  -TB     Stand-Sit [place] (Transfers)  contact guard;verbal cues  -TB     Row Name 21 0855          Gait/Stairs (Locomotion)    [place] Level (Gait)  contact guard;verbal cues  -TB     Assistive Device (Gait)  other (see comments) HHA  -TB     Distance in Feet (Gait)  30'x3  -TB     Deviations/Abnormal Patterns (Gait)  base of support, narrow;festinating/shuffling;gait speed decreased;stride length decreased  -TB     Comment (Gait/Stairs)  1 LOB requiring Min A to recover  -TB     Row Name 21 0855          Balance    Balance Assessment  sitting dynamic balance;standing static balance;standing dynamic balance;sitting static balance  -TB     Static Sitting Balance   WFL;unsupported;sitting, edge of bed  -TB     Dynamic Sitting Balance  WFL;unsupported;sitting, edge of bed  -TB     Static Standing Balance  WFL;unsupported;standing  -TB     Dynamic Standing Balance  WFL;supported;standing  -TB     Comment, Balance  Stood at sink while brushing her teeth w/ no LOB  -TB     Row Name 04/07/21 0855          Motor Skills    Therapeutic Exercise  ankle;knee;hip  -TB     Row Name 04/07/21 0855          Hip (Therapeutic Exercise)    Hip (Therapeutic Exercise)  AROM (active range of motion)  -TB     Hip AROM (Therapeutic Exercise)  bilateral;flexion;aBduction;aDduction;sitting 1x15  -TB     Row Name 04/07/21 0855          Knee (Therapeutic Exercise)    Knee (Therapeutic Exercise)  AROM (active range of motion)  -TB     Knee AROM (Therapeutic Exercise)  bilateral;flexion;extension;sitting 1x15  -TB     Row Name 04/07/21 0855          Ankle (Therapeutic Exercise)    Ankle (Therapeutic Exercise)  AROM (active range of motion)  -TB     Ankle AROM (Therapeutic Exercise)  bilateral;dorsiflexion;plantarflexion;sitting 1x15  -TB     Row Name             Wound 04/04/21 0142 coccyx    Wound - Properties Group Placement Date: 04/04/21  -VT Placement Time: 0142  -VT Location: coccyx  -VT    Retired Wound - Properties Group Date first assessed: 04/04/21  -VT Time first assessed: 0142  -VT Location: coccyx  -VT    Row Name 04/07/21 0855          Plan of Care Review    Plan of Care Reviewed With  patient  -TB     Progress  improving  -TB     Outcome Summary  Pt agreeable to therapy w/ no complaints. Sit<>stand CGA-SBA. Amb to the bathroom and stood at the sink to brush her teeth w/ no UE support and no LOB. Pt then amb 30' x3 w/ CGA. Pt did not run into any obstacles but the hallways was clear. Pt sat and perforemd BLE exercises x15 w/ cues for technique. Will cont current POC as pt tolerates.  -TB     Row Name 04/07/21 0855          Positioning and Restraints    Pre-Treatment Position  sitting in  chair/recliner  -TB     Post Treatment Position  bed  -TB     In Bed  fowlers;call light within reach;encouraged to call for assist;exit alarm on;side rails up x2  -TB       User Key  (r) = Recorded By, (t) = Taken By, (c) = Cosigned By    Initials Name Provider Type    VT Alyx Watson, RN Registered Nurse    Chris Amaya, PTA Physical Therapy Assistant        Physical Therapy Education                 Title: PT OT SLP Therapies (Not Started)     Topic: Physical Therapy (In Progress)     Point: Mobility training (Done)     Learning Progress Summary           Patient Acceptance, E, VU by WK at 4/6/2021 1155    Comment: POC                   Point: Home exercise program (Not Started)     Learner Progress:  Not documented in this visit.          Point: Body mechanics (Not Started)     Learner Progress:  Not documented in this visit.          Point: Precautions (Done)     Learning Progress Summary           Patient Acceptance, E, VU,NR by LW at 4/5/2021 1309    Comment: Pt educated on WB precautions in RUE; demoed how to assist in bed mobility appropriately.                               User Key     Initials Effective Dates Name Provider Type Discipline    WK 08/02/16 -  Carol Preston PTA Physical Therapy Assistant PT    LW 02/23/21 -  Simin Lr PT Student PT Student PT              PT Recommendation and Plan     Plan of Care Reviewed With: patient  Progress: improving  Outcome Summary: Pt agreeable to therapy w/ no complaints. Sit<>stand CGA-SBA. Amb to the bathroom and stood at the sink to brush her teeth w/ no UE support and no LOB. Pt then amb 30' x3 w/ CGA. Pt did not run into any obstacles but the hallways was clear. Pt sat and perforemd BLE exercises x15 w/ cues for technique. Will cont current POC as pt tolerates.  Outcome Measures     Row Name 04/06/21 0750             How much help from another is currently needed...    Putting on and taking off regular lower body clothing?  2  -CJ       Bathing (including washing, rinsing, and drying)  2  -CJ      Toileting (which includes using toilet bed pan or urinal)  2  -CJ      Putting on and taking off regular upper body clothing  2  -CJ      Taking care of personal grooming (such as brushing teeth)  2  -CJ      Eating meals  2  -CJ      AM-PAC 6 Clicks Score (OT)  12  -CJ         Functional Assessment    Outcome Measure Options  AM-PAC 6 Clicks Daily Activity (OT)  -        User Key  (r) = Recorded By, (t) = Taken By, (c) = Cosigned By    Initials Name Provider Type     Jacky Zhang COTA/L Occupational Therapy Assistant           Time Calculation:   PT Charges     Row Name 04/07/21 1055             Time Calculation    Start Time  0855  -TB      Stop Time  0918  -TB      Time Calculation (min)  23 min  -TB      PT Received On  04/07/21  -TB      PT Goal Re-Cert Due Date  04/15/21  -TB         Time Calculation- PT    Total Timed Code Minutes- PT  23 minute(s)  -TB        User Key  (r) = Recorded By, (t) = Taken By, (c) = Cosigned By    Initials Name Provider Type    TB Chris Stallworth PTA Physical Therapy Assistant        Therapy Charges for Today     Code Description Service Date Service Provider Modifiers Qty    76485307965 HC GAIT TRAINING EA 15 MIN 4/7/2021 Chris Stallworth, DAWN GP 1    48582782680 HC PT THER PROC EA 15 MIN 4/7/2021 Chris Stallworth PTA GP 1          PT G-Codes  Outcome Measure Options: AM-PAC 6 Clicks Daily Activity (OT)  AM-PAC 6 Clicks Score (PT): 14  AM-PAC 6 Clicks Score (OT): 12    Chris Stallworth PTA  4/7/2021

## 2021-04-07 NOTE — PROGRESS NOTES
PROGRESS NOTE.      Patient:  Mini Gentile  YOB: 1960  Date of Service: 4/7/2021  MRN: [id number]   Acct: [id number]   Primary Care Physician: Bharati Dahl APRN  Advance Directive:   Code Status and Medical Interventions:   Ordered at: 04/04/21 0213     Code Status:    CPR     Medical Interventions (Level of Support Prior to Arrest):    Full     Admit Date: 4/3/2021       Hospital Day: 3  Referring Provider: No ref. provider found      Patient Seen, Chart, Consults, Notes, Labs, Radiology studies reviewed.      Subjective:  Mini Gentile is a 61 y.o. female  whom we were consulted for end-stage renal disease.  She goes to [place] [facility] on Tuesday Thursday Saturday.  Apparently she was not sure yesterday.  She has been noncompliant lately.  Presented as she was brought in by family as she was found to be confused, disoriented.  She has been on Percocet for pain control for right humeral neck fracture.  Patient has type 2 diabetes, hypertension, COPD, tobacco use and anemia of chronic kidney disease. She was initially admitted to ICU. She was sleepy/confused/encephalopathy.  Patient received dialysis that was well-tolerated. She has since moved to the medical floor.  Today, she appeared to be comfortable with no new complaints.    Allergies:  Bupropion, Chantix [varenicline], Sulfa antibiotics, Azithromycin, Levofloxacin, and Penicillins    Home Meds:  Medications Prior to Admission   Medication Sig Dispense Refill Last Dose   • traMADol (ULTRAM) 50 MG tablet Take 50 mg by mouth Every 8 (Eight) Hours As Needed for Moderate Pain .      • albuterol sulfate  (90 Base) MCG/ACT inhaler Inhale 2 puffs Every 4 (Four) Hours As Needed for Wheezing. 18 g 0 Unknown at Unknown time   • aspirin 81 MG EC tablet Take 81 mg by mouth Daily.   Unknown at Unknown time   • atropine 1 % ophthalmic solution Administer 1 drop into the left eye Daily.   Unknown at Unknown time   • brimonidine  (ALPHAGAN) 0.2 % ophthalmic solution Administer 1 drop into the left eye 3 (Three) Times a Day.   Unknown at Unknown time   • carvedilol (COREG) 6.25 MG tablet Take 6.25 mg by mouth 2 (Two) Times a Day With Meals.   Unknown at Unknown time   • cholecalciferol (VITAMIN D3) 1000 units tablet Take 2,000 Units by mouth Daily.   Unknown at Unknown time   • dorzolamide (TRUSOPT) 2 % ophthalmic solution Administer 1 drop into the left eye 2 (Two) Times a Day.   Unknown at Unknown time   • famotidine (PEPCID) 20 MG tablet Take 1 tablet by mouth 2 (Two) Times a Day. 60 tablet 2 Unknown at Unknown time   • fluticasone (FLONASE) 50 MCG/ACT nasal spray 2 sprays into the nostril(s) as directed by provider 2 (Two) Times a Day.   Unknown at Unknown time   • insulin glargine (LANTUS) 100 UNIT/ML injection Inject 10 Units under the skin Every Night.   Unknown at Unknown time   • ipratropium (ATROVENT) 0.02 % nebulizer solution Take 500 mcg by nebulization Every 4 (Four) Hours As Needed for Wheezing or Shortness of Air.   Unknown at Unknown time   • lactobacillus acidophilus (RISAQUAD) capsule capsule Take 1 capsule by mouth Daily. 30 capsule 2 Unknown at Unknown time   • Latanoprostene Bunod (Vyzulta) 0.024 % solution Administer 1 drop into the left eye Every Night. Left eye    Unknown at Unknown time   • levothyroxine (SYNTHROID, LEVOTHROID) 25 MCG tablet Take 1 tablet by mouth Daily. 30 tablet 0 Unknown at Unknown time   • losartan (COZAAR) 50 MG tablet Take 50 mg by mouth 2 (Two) Times a Day.   Unknown at Unknown time   • Netarsudil Dimesylate (RHOPRESSA) 0.02 % solution Administer 1 drop into the left eye Every Night.   Unknown at Unknown time   • nicotine (NICODERM CQ) 21 MG/24HR patch Place 1 patch on the skin as directed by provider Daily. 30 patch 2 Unknown at Unknown time   • ondansetron (ZOFRAN) 4 MG tablet Take 1 tablet by mouth Every 6 (Six) Hours As Needed for Nausea or Vomiting. 24 tablet 2 Unknown at Unknown time   •  oxyCODONE-acetaminophen (PERCOCET) 7.5-325 MG per tablet Take 1 tablet by mouth Every 6 (Six) Hours As Needed for Severe Pain . 12 tablet 0 Unknown at Unknown time   • pravastatin (PRAVACHOL) 10 MG tablet Take 10 mg by mouth Every Night.   Unknown at Unknown time   • pregabalin (LYRICA) 50 MG capsule Take 50 mg by mouth 2 (Two) Times a Day.   Unknown at Unknown time   • sertraline (ZOLOFT) 25 MG tablet Take 25 mg by mouth Daily.   Unknown at Unknown time   • sevelamer (RENAGEL) 800 MG tablet Take 2,400 mg by mouth 3 (Three) Times a Day With Meals.   Unknown at Unknown time   • timolol (TIMOPTIC) 0.5 % ophthalmic solution Administer 1 drop into the left eye Every Morning.          Medicines:  Current Facility-Administered Medications   Medication Dose Route Frequency Provider Last Rate Last Admin   • acetaminophen (TYLENOL) tablet 650 mg  650 mg Oral Q4H PRN Fuentes Cartagena DO        Or   • acetaminophen (TYLENOL) suppository 650 mg  650 mg Rectal Q4H PRN Fuentes Cartagena DO       • atropine 1 % ophthalmic solution 1 drop  1 drop Left Eye Daily Fuentes Cartagena DO   1 drop at 04/07/21 0834   • brimonidine (ALPHAGAN) 0.2 % ophthalmic solution 1 drop  1 drop Left Eye TID Fuentes Cartagena DO   1 drop at 04/07/21 0834   • dextrose (D50W) 25 g/ 50mL Intravenous Solution 25 g  25 g Intravenous Q15 Min PRN Fuentes Cartagena DO   25 g at 04/04/21 1106   • dextrose (GLUTOSE) oral gel 15 g  15 g Oral Q15 Min PRN Fuentes Cartagena DO       • dorzolamide (TRUSOPT) 2 % ophthalmic solution 1 drop  1 drop Left Eye BID Fuentes Cartagena DO   1 drop at 04/07/21 0834   • fluticasone (FLONASE) 50 MCG/ACT nasal spray 2 spray  2 spray Nasal BID Fuentes Cartagena DO   2 spray at 04/07/21 0837   • glucagon (human recombinant) (GLUCAGEN DIAGNOSTIC) injection 1 mg  1 mg Subcutaneous Q15 Min PRN Fuentes Cartagena DO       • insulin detemir (LEVEMIR) injection 10 Units  10 Units Subcutaneous Nightly Fuentes Cartagena DO   10 Units at 04/06/21 2200    • insulin lispro (humaLOG) injection 0-9 Units  0-9 Units Subcutaneous TID AC Fuentes Cartagena DO   2 Units at 04/06/21 1729   • ipratropium (ATROVENT) nebulizer solution 0.5 mg  500 mcg Nebulization Q4H PRN Fuentes Cartagena, DO       • ipratropium-albuterol (DUO-NEB) nebulizer solution 3 mL  3 mL Nebulization Q6H PRN Fuentes Cartagena DO       • latanoprost (XALATAN) 0.005 % ophthalmic solution 1 drop  1 drop Left Eye Nightly Fuentes Cartagena DO   1 drop at 04/06/21 2158   • levothyroxine (SYNTHROID, LEVOTHROID) tablet 25 mcg  25 mcg Oral Q AM Fuentes Cartagena DO   25 mcg at 04/07/21 0606   • nicotine (NICODERM CQ) 21 MG/24HR patch 1 patch  1 patch Transdermal Q AM Fuentes Cartagena DO   1 patch at 04/07/21 0606   • oxyCODONE-acetaminophen (PERCOCET) 7.5-325 MG per tablet 1 tablet  1 tablet Oral Q6H PRN Fuentes Cartagena DO       • pantoprazole (PROTONIX) EC tablet 40 mg  40 mg Oral Q AM Fuentes Cartagena DO   40 mg at 04/07/21 0606   • pravastatin (PRAVACHOL) tablet 10 mg  10 mg Oral Nightly Fuentes Cartagena DO   10 mg at 04/06/21 2155   • pregabalin (LYRICA) capsule 50 mg  50 mg Oral BID Fuentes Cartagena DO   50 mg at 04/07/21 1208   • sertraline (ZOLOFT) tablet 25 mg  25 mg Oral Daily Fuentes Cartagena DO   25 mg at 04/07/21 1300   • sevelamer (RENVELA) tablet 2,400 mg  2,400 mg Oral TID With Meals Fuentes Cartagena DO   2,400 mg at 04/07/21 1208   • sodium chloride 0.9 % flush 10 mL  10 mL Intravenous Q12H Fuentes Cartagena DO   10 mL at 04/07/21 0835   • sodium chloride 0.9 % flush 10 mL  10 mL Intravenous PRN Fuentes Cartagena DO       • timolol (TIMOPTIC) 0.5 % ophthalmic solution 1 drop  1 drop Left Eye Q AM Fuentes Cartagena DO   1 drop at 04/07/21 0606       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    • Chronic otitis media    • Diabetes (CMS/[facility])    • End stage renal disease on dialysis (CMS/[facility])    • Eustachian tube dysfunction    •  "Glaucoma    • HTN (hypertension)    • Mucoid otitis media    • Noncompliance of patient with renal dialysis (CMS/HCC)    • Tobacco abuse        Past Surgical History:  Past Surgical History:   Procedure Laterality Date   • ARTERIOVENOUS FISTULA     • CATARACT EXTRACTION WITH INTRAOCULAR LENS IMPLANT     •  SECTION     • CHOLECYSTECTOMY     • MYRINGOTOMY W/ TUBES Bilateral    • MYRINGOTOMY W/ TUBES Right    • TUBAL ABDOMINAL LIGATION         Family History  Family History   Problem Relation Age of Onset   • Heart disease Mother    • Diabetes Father        Social History  Social History     Socioeconomic History   • Marital status: Single     Spouse name: Not on file   • Number of children: Not on file   • Years of education: Not on file   • Highest education level: Not on file   Tobacco Use   • Smoking status: Current Every Day Smoker     Packs/day: 2.00     Years: 6.00     Pack years: 12.00     Types: Cigarettes   • Smokeless tobacco: Never Used   Substance and Sexual Activity   • Alcohol use: No   • Drug use: No   • Sexual activity: Defer       Review of Systems:  History obtained from chart review and the patient  General ROS: No fever or chills  Respiratory ROS: No cough, +shortness of breath,- wheezing  Cardiovascular ROS: no chest pain but dyspnea on exertion  Gastrointestinal ROS: No abdominal pain or melena  Genito-Urinary ROS: No dysuria or hematuria  Musculoskeletal: negative  Skin: negative    Objective:  BP 90/47 (BP Location: Left arm, Patient Position: Lying)   Pulse 71   Temp 98.2 °F (36.8 °C) (Oral)   Resp 18   Ht 165.1 cm (65\")   Wt 59.4 kg (131 lb)   SpO2 96%   BMI 21.80 kg/m²     Intake/Output Summary (Last 24 hours) at 2021 1342  Last data filed at 2021 1300  Gross per 24 hour   Intake 570 ml   Output --   Net 570 ml       Physical examination:  General: awake/alert   Chest:eased breath sound on both lung field.  CVS: regular rate and rhythm  Abdominal: soft, nontender, " normal bowel sounds  Extremities: no cyanosis or edema  Skin: warm and dry without rash  Neuro: No focal motor deficits    Labs:  Lab Results (last 24 hours)     Procedure Component Value Units Date/Time    POC Glucose Once [[id number]]  (Normal) Collected: 04/07/21 1053    Specimen: Blood Updated: 04/07/21 1113     Glucose 129 mg/dL      Comment: : [id number] Lizeth MikiaMeter ID: [id number]       POC Glucose Once [[id number]]  (Normal) Collected: 04/07/21 0718    Specimen: Blood Updated: 04/07/21 0739     Glucose 85 mg/dL      Comment: : [id number] Lizeth MikiaMeter ID: [id number]       POC Glucose Once [[id number]]  (Abnormal) Collected: 04/06/21 2056    Specimen: Blood Updated: 04/06/21 2110     Glucose 210 mg/dL      Comment: : [id number] Lummus TerryMeter ID: [id number]             Radiology:   Imaging Results (Last 24 Hours)     ** No results found for the last 24 hours. **              Assessment   1.  End-stage renal disease on maintenance dialysis.  2.  Type II diabetic nephropathy.  3.  Noncompliant dialysis patient.  4.  Metabolic encephalopathy.  5.  Anemia of chronic kidney disease.  6.  Recent right humeral fracture.    Plan:  Continue dialysis thrice weekly.  Follow-up labs.      Ten Boyd MD  4/7/2021  13:42 CDT

## 2021-04-07 NOTE — PROGRESS NOTES
Continued Stay Note   Db     Patient Name: Mini Gentile  MRN: [id number]  Today's Date: 4/7/2021    Admit Date: 4/3/2021    Discharge Plan     Row Name 04/07/21 1346       Plan    Plan  Home    Patient/Family in Agreement with Plan  yes    Plan Comments  Pt as well as family are adamant against SNF.  If pt does dc home, pt would benefit from home health services at minimum.        Discharge Codes    No documentation.             STACI Centeno

## 2021-04-07 NOTE — THERAPY TREATMENT NOTE
Acute Care - Physical Therapy Treatment Note  [facility]     Patient Name: Mini Gentile  : 1960  MRN: [id number]  Today's Date: 2021           PT Assessment (last 12 hours)      PT Evaluation and Treatment     Row Name 21 1359 21 0855       Physical Therapy Time and Intention    Subjective Information  no complaints  -TB  no complaints  -TB    Document Type  therapy note (daily note)  -TB  therapy note (daily note)  -TB    Mode of Treatment  physical therapy  -TB  physical therapy  -TB    Patient Effort  --  good  -TB    Comment  Pts call light going off. Therapy arrived and pt was sitting in the floor. Called nursing. Pt stated she slid out of her chair trying to get her call light. Pt stated she didnot hurt herself or hit her head. Depenedent lift from floor was done by therapist and RN. RN checked pt over and did vitals. RN stated she would chart a safe report.  -TB  --    Row Name 21 1359 21 0855       General Information    Existing Precautions/Restrictions  right;non-weight bearing;other (see comments) NWB RUE  -TB  non-weight bearing;right;other (see comments) NWB RUE  -TB    Row Name 21 1359 21 0855       Bed Mobility    Bed Mobility  sit-supine;scooting/bridging  -TB  rolling left;scooting/bridging;sit-supine  -TB    Rolling Left [place] (Bed Mobility)  --  contact guard;verbal cues  -TB    Scooting/Bridging [place] (Bed Mobility)  maximum assist (25% patient effort);2 person assist;verbal cues Scooting up in bed  -TB  contact guard;verbal cues  -TB    Sit-Supine [place] (Bed Mobility)  minimum assist (75% patient effort);verbal cues  -TB  minimum assist (75% patient effort);verbal cues  -TB    Assistive Device (Bed Mobility)  bed rails;draw sheet  -TB  bed rails;head of bed elevated  -TB    Row Name 21 1359 21 0855       Transfers    Transfers  floor transfer  -TB  sit-stand transfer;stand-sit transfer  -TB    Sit-Stand  [place] (Transfers)  --  contact guard;verbal cues  -TB    Stand-Sit [place] (Transfers)  --  contact guard;verbal cues  -TB    Row Name 04/07/21 1359          Floor Transfer    Type (Floor Transfer)  Lift from floor  -TB     [place] Level (Floor Transfer)  dependent (less than 25% patient effort);2 person assist;verbal cues  -TB     Row Name 04/07/21 0855          Gait/Stairs (Locomotion)    [place] Level (Gait)  contact guard;verbal cues  -TB     Assistive Device (Gait)  other (see comments) HHA  -TB     Distance in Feet (Gait)  30'x3  -TB     Deviations/Abnormal Patterns (Gait)  base of support, narrow;festinating/shuffling;gait speed decreased;stride length decreased  -TB     Comment (Gait/Stairs)  1 LOB requiring Min A to recover  -     Row Name 04/07/21 0855          Balance    Balance Assessment  sitting dynamic balance;standing static balance;standing dynamic balance;sitting static balance  -TB     Static Sitting Balance  WFL;unsupported;sitting, edge of bed  -TB     Dynamic Sitting Balance  WFL;unsupported;sitting, edge of bed  -TB     Static Standing Balance  WFL;unsupported;standing  -TB     Dynamic Standing Balance  WFL;supported;standing  -TB     Comment, Balance  Stood at sink while brushing her teeth w/ no LOB  -     Row Name 04/07/21 0855          Motor Skills    Therapeutic Exercise  ankle;knee;hip  -     Row Name 04/07/21 0855          Hip (Therapeutic Exercise)    Hip (Therapeutic Exercise)  AROM (active range of motion)  -TB     Hip AROM (Therapeutic Exercise)  bilateral;flexion;aBduction;aDduction;sitting 1x15  -TB     Row Name 04/07/21 0855          Knee (Therapeutic Exercise)    Knee (Therapeutic Exercise)  AROM (active range of motion)  -TB     Knee AROM (Therapeutic Exercise)  bilateral;flexion;extension;sitting 1x15  -TB     Row Name 04/07/21 0855          Ankle (Therapeutic Exercise)    Ankle (Therapeutic Exercise)  AROM (active range of motion)  -TB     Ankle  AROM (Therapeutic Exercise)  bilateral;dorsiflexion;plantarflexion;sitting 1x15  -TB     Row Name             Wound 04/04/21 0142 coccyx    Wound - Properties Group Placement Date: 04/04/21  -VT Placement Time: 0142 -VT Location: coccyx  -VT    Retired Wound - Properties Group Date first assessed: 04/04/21  -VT Time first assessed: 0142 -VT Location: coccyx  -VT    Row Name 04/07/21 0855          Plan of Care Review    Plan of Care Reviewed With  patient  -TB     Progress  improving  -TB     Outcome Summary  Pt agreeable to therapy w/ no complaints. Sit<>stand CGA-SBA. Amb to the bathroom and stood at the sink to brush her teeth w/ no UE support and no LOB. Pt then amb 30' x3 w/ CGA. Pt did not run into any obstacles but the hallways was clear. Pt sat and perforemd BLE exercises x15 w/ cues for technique. Will cont current POC as pt tolerates.  -TB     Row Name 04/07/21 1359 04/07/21 0855       Positioning and Restraints    Pre-Treatment Position  other (comment) Pt in floor upon arrival  -TB  sitting in chair/recliner  -TB    Post Treatment Position  bed  -TB  bed  -TB    In Bed  notified nsg;fowlers;call light within reach;encouraged to call for assist;exit alarm on;side rails up x2  -TB  fowlers;call light within reach;encouraged to call for assist;exit alarm on;side rails up x2  -TB      User Key  (r) = Recorded By, (t) = Taken By, (c) = Cosigned By    Initials Name Provider Type    VT Alyx Watson, RN Registered Nurse    Chris Amaya, PTA Physical Therapy Assistant        Physical Therapy Education                 Title: PT OT SLP Therapies (Not Started)     Topic: Physical Therapy (In Progress)     Point: Mobility training (Done)     Learning Progress Summary           Patient Acceptance, E, VU by WK at 4/6/2021 1155    Comment: POC                   Point: Home exercise program (Not Started)     Learner Progress:  Not documented in this visit.          Point: Body mechanics (Not Started)      Learner Progress:  Not documented in this visit.          Point: Precautions (Done)     Learning Progress Summary           Patient Acceptance, E, VU,NR by LW at 4/5/2021 1309    Comment: Pt educated on WB precautions in RUE; demoed how to assist in bed mobility appropriately.                               User Key     Initials Effective Dates Name Provider Type Discipline    WK 08/02/16 -  Carol Preston PTA Physical Therapy Assistant PT    LW 02/23/21 -  Simin Lr PT Student PT Student PT              PT Recommendation and Plan     Plan of Care Reviewed With: patient  Progress: improving  Outcome Summary: Pt agreeable to therapy w/ no complaints. Sit<>stand CGA-SBA. Amb to the bathroom and stood at the sink to brush her teeth w/ no UE support and no LOB. Pt then amb 30' x3 w/ CGA. Pt did not run into any obstacles but the hallways was clear. Pt sat and perforemd BLE exercises x15 w/ cues for technique. Will cont current POC as pt tolerates.  Outcome Measures     Row Name 04/07/21 0801 04/06/21 0757          How much help from another is currently needed...    Putting on and taking off regular lower body clothing?  2  -CJ  2  -CJ     Bathing (including washing, rinsing, and drying)  2  -CJ  2  -CJ     Toileting (which includes using toilet bed pan or urinal)  2  -CJ  2  -CJ     Putting on and taking off regular upper body clothing  2  -CJ  2  -CJ     Taking care of personal grooming (such as brushing teeth)  2  -CJ  2  -CJ     Eating meals  3  -CJ  2  -CJ     AM-PAC 6 Clicks Score (OT)  13  -CJ  12  -CJ        Functional Assessment    Outcome Measure Options  AM-PAC 6 Clicks Daily Activity (OT)  -CJ  AM-PAC 6 Clicks Daily Activity (OT)  -CJ       User Key  (r) = Recorded By, (t) = Taken By, (c) = Cosigned By    Initials Name Provider Type    CJ Jacky Zhang COTA/L Occupational Therapy Assistant           Time Calculation:   PT Charges     Row Name 04/07/21 8806 04/07/21 1055          Time  Calculation    Start Time  1359  -TB  0855  -TB     Stop Time  1408  -TB  0918  -TB     Time Calculation (min)  9 min  -TB  23 min  -TB     PT Received On  04/07/21  -TB  04/07/21  -TB     PT Goal Re-Cert Due Date  04/15/21  -TB  04/15/21  -TB        Time Calculation- PT    Total Timed Code Minutes- PT  9 minute(s)  -TB  23 minute(s)  -TB       User Key  (r) = Recorded By, (t) = Taken By, (c) = Cosigned By    Initials Name Provider Type    TB Chris Stallworth PTA Physical Therapy Assistant        Therapy Charges for Today     Code Description Service Date Service Provider Modifiers Qty    32971491131 HC GAIT TRAINING EA 15 MIN 4/7/2021 Chris Stallworth, DAWN GP 1    10847095499 HC PT THER PROC EA 15 MIN 4/7/2021 Chris Stallworth, DAWN GP 1    31140484932 HC PT THERAPEUTIC ACT EA 15 MIN 4/7/2021 Chris Stallworth, DAWN GP 1          PT G-Codes  Outcome Measure Options: AM-PAC 6 Clicks Daily Activity (OT)  AM-PAC 6 Clicks Score (PT): 14  AM-PAC 6 Clicks Score (OT): 13    Chris Stallworth PTA  4/7/2021

## 2021-04-07 NOTE — PLAN OF CARE
Goal Outcome Evaluation:  Plan of Care Reviewed With: patient  Progress: no change   Pt. Primarily oriented, but with bouts of confusion. No complaints of pain. Tilted every two hours with pillow in chair for comfort and to prevent skin breakdown. No BM's throughout night. VSS. Will continue to monitor.

## 2021-04-07 NOTE — PLAN OF CARE
Problem: Adult Inpatient Plan of Care  Goal: Plan of Care Review  Outcome: Ongoing, Not Progressing  Flowsheets (Taken 4/7/2021 0801)  Progress: improving  Plan of Care Reviewed With: patient  Outcome Summary: Pt. sitting in chair, after set-up and assist with food prep pt. able to feed self using built-up handled utensil! Pt. has issues with vision, so vc's and some tactile cues required!   Goal Outcome Evaluation:  Plan of Care Reviewed With: patient  Progress: improving  Outcome Summary: Pt. sitting in chair, after set-up and assist with food prep pt. able to feed self using built-up handled utensil! Pt. has issues with vision, so vc's and some tactile cues required!

## 2021-04-07 NOTE — PROGRESS NOTES
[facility] Medicine Services  INPATIENT PROGRESS NOTE    Patient Name: Mini Gentile  Date of Admission: 4/3/2021  Today's Date: 04/07/21  Length of Stay: 3  Primary Care Physician: Bharati Dahl APRN    Subjective   Chief Complaint: Weakness.   HPI  Seems to be back to her mental status baseline.  No confusion noted by the nursing staff.  No recent hallucinations.  Tolerating being back on Lyrica.    Complains of her right arm being sore.    She set up in the chair for most of the morning according to the nursing staff.    Awaiting dialysis today.  She did not dialyze yesterday and should be back on her regular schedule with a treatment tomorrow.    Family and patient still trying to decide on home with home health versus SNF.    Review of Systems   All pertinent negatives and positives are as above. All other systems have been reviewed and are negative unless otherwise stated.     Objective    Temp:  [98 °F (36.7 °C)-99.4 °F (37.4 °C)] 98.2 °F (36.8 °C)  Heart Rate:  [71-94] 71  Resp:  [18] 18  BP: ()/(43-70) 90/47  Physical Exam  Constitutional:       Appearance: She is well-developed.      Comments: Up in bed.  No distress.  No family present.  Seen and discussed with her nurse, Rene   HENT:      Head: Normocephalic and atraumatic.   Eyes:      Conjunctiva/sclera: Conjunctivae normal.      Pupils: Pupils are equal, round, and reactive to light.   Neck:      Vascular: No JVD.   Cardiovascular:      Rate and Rhythm: Normal rate and regular rhythm.      Heart sounds: Normal heart sounds. No murmur heard.   No friction rub. No gallop.       Comments: Left upper extremity AVF.  Pulmonary:      Effort: Pulmonary effort is normal. No respiratory distress.      Breath sounds: Normal breath sounds. No wheezing or rales.   Chest:      Chest wall: No tenderness.   Abdominal:      General: Bowel sounds are normal. There is no distension.      Palpations: Abdomen is soft.       Tenderness: There is no abdominal tenderness. There is no guarding or rebound.   Musculoskeletal:         General: No tenderness or deformity. Normal range of motion.      Cervical back: Neck supple.      Comments: Right upper extremity in a sling.   Skin:     General: Skin is warm and dry.      Findings: No rash.      Comments: Bruising of the right shoulder.   Neurological:      General: No focal deficit present.      Mental Status: She is alert and oriented to person, place, and time.      Cranial Nerves: No cranial nerve deficit.      Motor: Weakness present. No abnormal muscle tone.      Deep Tendon Reflexes: Reflexes normal.   Psychiatric:      Comments: Flat, but conversational.  Does not seem to have any significant confusion at the moment.       Results Review:  I have reviewed the labs, radiology results, and diagnostic studies.    Laboratory Data:   No new labs this morning.    I have reviewed the patient's current medications.     Assessment/Plan     Active Hospital Problems    Diagnosis    • **Encephalopathy, metabolic, due to uremia    • End stage renal failure on dialysis (CMS/[facility])    • Non-compliance with renal dialysis (CMS/[facility])    • Heme positive stool    • Anemia due to chronic kidney disease, on chronic dialysis (CMS/[facility])    • Closed fracture of right proximal humerus    • Type 2 diabetes mellitus, with long-term current use of insulin (CMS/[facility])    • HTN (hypertension)      Plan:  The patient known to the service.  She was just in the hospital and discharged on 3/28.  At that time, she had fallen and was found to have a minimally displaced fracture of the proximal end of the right humerus.  She was admitted for orthopedic evaluation and dialysis.  She was ultimately dismissed in stable condition.  Apparently, since that time, she has not done as well as usual and has also missed dialysis recently.  She presented with what was felt to be uremic encephalopathy on 4/3 and was admitted by   Donte.    Nephrology consulted.  She typically dialyzes on a Tuesday, Thursday, and Saturday schedule.  She missed dialysis on Saturday, 4/3.  She was dialyzed on 4/4 and 4/5.  Dialysis again today.  Nephrology continues to follow.    She has problems with anemia of chronic disease secondary to her renal dysfunction.  She had a fecal occult blood test done in the emergency department that was positive.  She was started on a Protonix drip and GI was consulted. No plans for endoscopy at this point according to GI.  She did have a dark bowel movement on the morning of 4/6 according to the nursing staff. Discontinued the Protonix drip and placed her on oral Protonix on 4/5.    She recently suffered a humerus fracture.  She was evaluated by orthopedics on her previous hospitalization.  She was placed in a sling for conservative management.  She has upcoming follow-up in the office for repeat x-rays.    Her most recent hemoglobin A1c was 5.8.  She takes Lantus in the home setting.  Continue Accu-Cheks and sliding scale insulin.    Reconciled and resumed other home medications as appropriate.    PT/OT.    Transferred to the floor on 4/5.    Discharge Planning: I expect the patient to be discharged home with home health or SNF in 1-2 days.    Electronically signed by Fuentes Cartagena DO, 04/07/21, 12:22 CDT.

## 2021-04-07 NOTE — PLAN OF CARE
Goal Outcome Evaluation:  Plan of Care Reviewed With: patient  Progress: improving  Outcome Summary: Pt agreeable to therapy w/ no complaints. Sit<>stand CGA-SBA. Amb to the bathroom and stood at the sink to brush her teeth w/ no UE support and no LOB. Pt then amb 30' x3 w/ CGA. Pt did not run into any obstacles but the hallways was clear. Pt sat and perforemd BLE exercises x15 w/ cues for technique. Will cont current POC as pt tolerates.

## 2021-04-08 NOTE — PLAN OF CARE
Problem: Adult Inpatient Plan of Care  Goal: Plan of Care Review  Outcome: Ongoing, Progressing  Flowsheets (Taken 4/8/2021 1719)  Progress: improving  Plan of Care Reviewed With: patient  Outcome Summary: Pt complained of slight pain to R shoulder with activity. Rested between care. A/O x4 through the shift. Up to BSC with assist x1, tolerated well. VSS, safety maintained. NSR 72-83 on tele. Will update MD as needed

## 2021-04-08 NOTE — DISCHARGE SUMMARY
[facility] Medicine Services  DISCHARGE SUMMARY       Date of Admission: 4/3/2021  Date of Discharge:  4/8/2021  Primary Care Physician: Bharati Dahl APRN    Presenting Problem/History of Present Illness:  Altered mental status.     Final Discharge Diagnoses:  Active Hospital Problems    Diagnosis    • **Encephalopathy, metabolic, due to uremia    • End stage renal failure on dialysis (CMS/[facility])    • Non-compliance with renal dialysis (CMS/[facility])    • Heme positive stool    • Anemia due to chronic kidney disease, on chronic dialysis (CMS/[facility])    • Closed fracture of right proximal humerus    • Type 2 diabetes mellitus, with long-term current use of insulin (CMS/[facility])    • HTN (hypertension)      Consults: Dr. Caballero and Dr. Boyd with nephrology.     Procedures Performed: None.     Pertinent Test Results:   Imaging Results (All)     Procedure Component Value Units Date/Time    SCANNED - IMAGING [[id number]] Resulted: 04/03/21     Updated: 04/05/21 0237    CT Head Without Contrast [[id number]] Collected: 04/04/21 0746     Updated: 04/04/21 0754    Narrative:      Exam: CT HEAD WO CONTRAST-     Indication: Altered mental status     Comparison: 3/26/2021     Dose length product: 685 mGy cm. Automated exposure control was also  utilized to decrease patient radiation dose.     Findings:     No acute intracranial hemorrhage. No loss of gray-white differentiation.  Chronic microvascular ischemic white matter change and global cerebral  volume loss. No midline shift or mass effect. LEFT globe prosthetic  device. No acute orbital finding. Small amount of gas in the RIGHT  temporal soft tissues is likely venous related to IV placement. Mastoid  air cells and visualized paranasal sinuses are clear. No acute osseous  finding.       Impression:         1.  No acute intracranial findings.  2.  Global cerebral volume loss and presumed chronic microvascular  ischemic white matter change.  3.   RIGHT temporal soft tissue gas, likely related to IV placement.     These findings are in agreement with the critical and emergent findings  from the [facility] preliminary report.  This report was finalized on 04/04/2021 07:51 by Dr. Vasquez Villanueva MD.    XR Chest 1 View [[id number]] Collected: 04/04/21 0643     Updated: 04/04/21 0648    Narrative:      Exam: XR CHEST 1 VW-     Indication: Weak/Dizzy/AMS triage protocol     Comparison: 3/26/2021     Findings:     Cardiac silhouette is stable. No change in small to moderate RIGHT  pleural effusion with overlying atelectasis/consolidation. The LEFT lung  and pleural space appear clear. No visible pneumothorax. LEFT subclavian  stent noted. Redemonstrated RIGHT humeral surgical neck fracture with  involvement of the greater and lesser tuberosities.       Impression:      Impression:     No change in small to moderate RIGHT pleural effusion with overlying  atelectasis/consolidation.  This report was finalized on 04/04/2021 06:45 by Dr. Vasquez Villanueva MD.        LAB RESULTS:      Lab 04/08/21  0415 04/06/21  0343 04/05/21  0703 04/05/21  0027 04/04/21  1148 04/04/21  0753 04/04/21  0247 04/03/21  2155   WBC 5.96 3.99  --  3.24*  --   --  3.73 3.77   HEMOGLOBIN 9.2* 9.1* 9.6* 9.9* 8.5* 10.0* 10.2* 9.7*   HEMATOCRIT 29.0* 28.4* 30.6* 31.5* 27.1* 31.1* 32.8* 30.3*   PLATELETS 158 151  --  155  --   --  162 177   NEUTROS ABS  --   --   --   --   --   --  2.25 2.21   IMMATURE GRANS (ABS)  --   --   --   --   --   --  0.02 0.01   LYMPHS ABS  --   --   --   --   --   --  0.76 0.77   MONOS ABS  --   --   --   --   --   --  0.49 0.53   EOS ABS  --   --   --   --   --   --  0.19 0.22   MCV 97.3* 95.6  --  97.2*  --   --  97.9* 95.9   PROCALCITONIN  --   --   --   --   --  0.23  --   --    PROTIME  --   --   --   --   --   --   --  14.4         Lab 04/08/21  0415 04/06/21  0343 04/05/21  0027 04/04/21  0247 04/03/21  2340 04/03/21  6937   SODIUM 129* 137 137 137 136  --    POTASSIUM  5.8* 4.1 4.3 4.7 4.9  --    CHLORIDE 90* 99 97* 98 96*  --    CO2 22.0 25.0 21.0* 24.0 24.0  --    ANION GAP 17.0* 13.0 19.0* 15.0 16.0*  --    BUN 36* 21 24* 47* 45*  --    CREATININE 5.26* 2.69* 3.72* 5.91* 5.52*  --    GLUCOSE 127* 215* 96 89 100*  --    CALCIUM 9.4 9.2 9.0 9.5 9.5  --    MAGNESIUM  --   --   --  2.5*  --  2.5*   PHOSPHORUS  --   --   --  5.3* 5.2*  --    HEMOGLOBIN A1C  --   --   --   --   --  5.80*   TSH  --   --   --   --  4.570*  --          Lab 04/05/21  0027 04/04/21  0247 04/03/21  2340   TOTAL PROTEIN 6.3 6.8 6.9   ALBUMIN 3.30* 3.60 3.90   GLOBULIN 3.0 3.2 3.0   ALT (SGPT) 13 15 9   AST (SGOT) 27 40* 26   BILIRUBIN 1.1 0.9 0.7   ALK PHOS 212* 229* 206*         Lab 04/04/21  1148 04/04/21  0753 04/03/21  2340 04/03/21  2155   PROBNP  --   --  58,602.0*  --    TROPONIN T 0.082* 0.091* 0.099*  --    PROTIME  --   --   --  14.4   INR  --   --   --  1.16*             Lab 04/04/21  0044 04/03/21  2340   IRON  --  41   IRON SATURATION  --  13*   TIBC  --  326   TRANSFERRIN  --  219   FERRITIN  --  641.90*   FOLATE  --  8.59   VITAMIN B 12  --  479   ABO TYPING A  --    RH TYPING Positive  --    ANTIBODY SCREEN Negative  --          Lab 04/03/21 2201   PH, ARTERIAL 7.430   PCO2, ARTERIAL 39.3   PO2 ART 72.7*   O2 SATURATION ART 96.4   HCO3 ART 26.1*   BASE EXCESS ART 1.7     Brief Urine Lab Results  (Last result in the past 365 days)      Color   Clarity   Blood   Leuk Est   Nitrite   Protein   CREAT   Urine HCG        04/03/21 2220 Dark Yellow Clear Small (1+) Trace Negative >=300 mg/dL (3+)             Microbiology Results (last 10 days)     Procedure Component Value - Date/Time    COVID-19,[facility] IN-HOUSE,Nasal Swab No Transport Media 3-4 HR TAT - Swab, Nasal Cavity [[id number]]  (Normal) Collected: 04/03/21 2216    Lab Status: Final result Specimen: Swab from Nasal Cavity Updated: 04/03/21 2306     COVID19 Not Detected    Narrative:      Fact sheet for providers:  https://www.fda.gov/media/120880/download     Fact sheet for patients: https://www.fda.gov/media/191049/download    Test performed by PCR.    Consider negative results in combination with clinical observations, patient history, and epidemiological information.        Hospital Course:  The patient is known to the service.  She was just in the hospital and discharged on 3/28.  At that time, she had fallen and was found to have a minimally displaced fracture of the proximal end of the right humerus.  She was admitted for orthopedic evaluation and dialysis.  She was ultimately dismissed in stable condition.  Apparently, since that time, she has not done as well as usual and has also missed dialysis recently.  She presented with what was felt to be uremic encephalopathy on 4/3 and was admitted by Dr. Kent.     Nephrology consulted.  She typically dialyzes on a Tuesday, Thursday, and Saturday schedule. She missed dialysis on Saturday, 4/3.  She was dialyzed on 4/4 and 4/5.  Dialysis again today as it is her regular schedule.    She did have some new hyponatremia and hyperkalemia on labs this morning, but these should be corrected with her dialysis treatment today.     She has problems with anemia of chronic disease secondary to her renal dysfunction.  She had a fecal occult blood test done in the emergency department that was positive.  She was started on a Protonix drip and GI was consulted. No plans for endoscopy at this point according to GI.  She did have a dark bowel movement on the morning of 4/6 according to the nursing staff. Discontinued the Protonix drip and placed her on oral Protonix on 4/5.  She usually takes Pepcid at home, but we will have her on Protonix moving forward.     She recently suffered a humerus fracture.  She was evaluated by orthopedics on her previous hospitalization.  She was placed in a sling for conservative management.  She has upcoming follow-up in the office for repeat x-rays.     Her  "most recent hemoglobin A1c was 5.8.  She takes Lantus in the home setting.  Continue Accu-Cheks and sliding scale insulin.  Lowest glucose was 85.     Reconciled and resumed other home medications as appropriate.     PT/OT followed again.     Transferred to the floor on 4/5.    There was some discussion about her going to skilled nursing at discharge, but she and her family no longer wish to do this.  They want for her to return home with family assistance and home health.  She is stable for discharge today.  She is out of pain medication for her right arm so I have sent her prescription for this.  She needs to follow-up with her primary care provider in 1 week and see orthopedics as scheduled.  She needs to be compliant with her dialysis.    Physical Exam on Discharge:  /55 (BP Location: Left arm, Patient Position: Lying)   Pulse 80   Temp 98 °F (36.7 °C) (Oral)   Resp 18   Ht 165.1 cm (65\")   Wt 59.9 kg (132 lb 1 oz)   SpO2 97%   BMI 21.98 kg/m²   Physical Exam  Constitutional:       Appearance: She is well-developed.      Comments: Up in bed.  Seen on dialysis.  No distress.  No family present.  Discussed with her nurse, Vivian.   HENT:      Head: Normocephalic and atraumatic.   Eyes:      Conjunctiva/sclera: Conjunctivae normal.      Pupils: Pupils are equal, round, and reactive to light.   Neck:      Vascular: No JVD.   Cardiovascular:      Rate and Rhythm: Normal rate and regular rhythm.      Heart sounds: Normal heart sounds. No murmur heard.   No friction rub. No gallop.       Comments: Left upper extremity AVF accessed for HD.  Pulmonary:      Effort: Pulmonary effort is normal. No respiratory distress.      Breath sounds: Normal breath sounds. No wheezing or rales.   Chest:      Chest wall: No tenderness.   Abdominal:      General: Bowel sounds are normal. There is no distension.      Palpations: Abdomen is soft.      Tenderness: There is no abdominal tenderness. There is no guarding or " rebound.   Musculoskeletal:         General: No tenderness or deformity. Normal range of motion.      Cervical back: Neck supple.      Comments: Right upper extremity in a sling.   Skin:     General: Skin is warm and dry.      Findings: No rash.      Comments: Bruising of the right shoulder.   Neurological:      General: No focal deficit present.      Mental Status: She is alert and oriented to person, place, and time.      Cranial Nerves: No cranial nerve deficit.      Motor: Weakness present. No abnormal muscle tone.      Deep Tendon Reflexes: Reflexes normal.   Psychiatric:      Comments: Flat, but conversational.       Condition on Discharge: Stable for home with home health and family assistance.     Discharge Disposition:  Home or Self Care    Discharge Medications:     Discharge Medications      New Medications      Instructions Start Date   pantoprazole 40 MG EC tablet  Commonly known as: PROTONIX   40 mg, Oral, Daily         Continue These Medications      Instructions Start Date   aspirin 81 MG EC tablet   81 mg, Oral, Daily      atropine 1 % ophthalmic solution   1 drop, Left Eye, Daily      brimonidine 0.2 % ophthalmic solution  Commonly known as: ALPHAGAN   1 drop, Left Eye, 3 Times Daily      carvedilol 6.25 MG tablet  Commonly known as: COREG   6.25 mg, Oral, 2 Times Daily With Meals      cholecalciferol 25 MCG (1000 UT) tablet  Commonly known as: VITAMIN D3   2,000 Units, Oral, Daily      dorzolamide 2 % ophthalmic solution  Commonly known as: TRUSOPT   1 drop, Left Eye, 2 Times Daily      fluticasone 50 MCG/ACT nasal spray  Commonly known as: FLONASE   2 sprays, Nasal, 2 Times Daily      insulin glargine 100 UNIT/ML injection  Commonly known as: LANTUS, SEMGLEE   10 Units, Subcutaneous, Nightly      ipratropium 0.02 % nebulizer solution  Commonly known as: ATROVENT   500 mcg, Nebulization, Every 4 Hours PRN      lactobacillus acidophilus capsule capsule   1 capsule, Oral, Daily      levothyroxine 25  MCG tablet  Commonly known as: SYNTHROID, LEVOTHROID   25 mcg, Oral, Daily      nicotine 21 MG/24HR patch  Commonly known as: NICODERM CQ   1 patch, Transdermal, Every 24 Hours      ondansetron 4 MG tablet  Commonly known as: ZOFRAN   4 mg, Oral, Every 6 Hours PRN      oxyCODONE-acetaminophen 7.5-325 MG per tablet  Commonly known as: PERCOCET   1 tablet, Oral, Every 6 Hours PRN      pravastatin 10 MG tablet  Commonly known as: PRAVACHOL   10 mg, Oral, Nightly      pregabalin 50 MG capsule  Commonly known as: LYRICA   50 mg, Oral, 2 Times Daily      Rhopressa 0.02 % solution  Generic drug: Netarsudil Dimesylate   1 drop, Left Eye, Nightly      sertraline 25 MG tablet  Commonly known as: ZOLOFT   25 mg, Oral, Daily      sevelamer 800 MG tablet  Commonly known as: RENAGEL   2,400 mg, Oral, 3 Times Daily With Meals      timolol 0.5 % ophthalmic solution  Commonly known as: TIMOPTIC   1 drop, Left Eye, Every Morning      Ventolin  (90 Base) MCG/ACT inhaler  Generic drug: albuterol sulfate HFA   2 puffs, Inhalation, Every 4 Hours PRN      Vyzulta 0.024 % solution  Generic drug: Latanoprostene Bunod   1 drop, Left Eye, Nightly, Left eye         Stop These Medications    famotidine 20 MG tablet  Commonly known as: PEPCID     losartan 50 MG tablet  Commonly known as: COZAAR     traMADol 50 MG tablet  Commonly known as: ULTRAM          Discharge Diet:   Diet Instructions     Diet: Soft Texture, Consistent Carbohydrate; Thin Liquids, No Restrictions; Ground      Discharge Diet:  Soft Texture  Consistent Carbohydrate       Fluid Consistency: Thin Liquids, No Restrictions    Soft Options: Ground        Activity at Discharge:   Activity Instructions     Activity as Tolerated          Follow-up Appointments:   1.  PCP in 1 week.  2.  Orthopedics is scheduled.  3.  Nephrology on dialysis with her regular schedule being Tuesday, Thursday, and Saturday.    Test Results Pending at Discharge: None.     Electronically signed by  Fuentes Cartagena DO, 04/08/21, 11:06 CDT.    Time: 35 minutes.

## 2021-04-08 NOTE — DISCHARGE PLACEMENT REQUEST
"Mini Gentile (61 y.o. Female)     Date of Birth Social Security Number Address Home Phone MRN    1960  [address] [place] [id number] [phone] [id number]    [employer] Marital Status          Other Single       Admission Date Admission Type Admitting Provider Attending Provider Department, Room/Bed    4/3/21 Emergency Fuentes Cartagena DO Hancock, John C, DO [facility] 4B, 433/1    Discharge Date Discharge Disposition Discharge Destination         Home or Self Care              Attending Provider: Fuentes Cartagena DO    Allergies: Bupropion, Chantix [Varenicline], Sulfa Antibiotics, Azithromycin, Levofloxacin, Penicillins    Isolation: None   Infection: None   Code Status: CPR    Ht: 165.1 cm (65\")   Wt: 59.9 kg (132 lb 1 oz)    Admission Cmt: None   Principal Problem: Encephalopathy, metabolic, due to uremia [G93.41]                 Active Insurance as of 4/3/2021     Primary Coverage     Payor Plan Insurance Group Employer/Plan Group    [facility] MEDICARE REPLACEMENT WELLCARE MEDICARE REPLACEMENT      Payor Plan Address Payor Plan Phone Number Payor Plan Fax Number Effective Dates    [address] [address] [phone]  4/1/2021 - None Entered    [facility] [id number]       Subscriber Name Subscriber Birth Date Member ID       MINI GENTILE 1960 [id number]           Secondary Coverage     Payor Plan Insurance Group Employer/Plan Group    [facility] WELLCARE MEDICAID      Payor Plan Address Payor Plan Phone Number Payor Plan Fax Number Effective Dates    [address] [address] [phone]  11/4/2016 - None Entered    [facility] [id number]       Subscriber Name Subscriber Birth Date Member ID       MINI GENTILE 1960 [id number]                 Emergency Contacts      (Rel.) Home Phone Work Phone Mobile Phone    Julio Cesar Gentile (Son) [phone] -- [phone]    TawnyaErica (Daughter) [phone] -- [phone]    Jose Bowling (Other) [phone] -- " [phone]        Ambulatory Referral to Home Health [[id number]] (Order [id number])  Order  Date: 4/8/2021 Department: [address] Ordering/Authorizing: Fuentes Cartagena DO   Order History  Outpatient  Date/Time Action Taken User Additional Information   04/08/21 1104 Sign Fuentes Cartagena DO    Order Details    Frequency Duration Priority Order Class   None None Routine Internal Referral   Start Date/Time    Start Date   04/08/21   Order Information    Order Date Service Start Date Start Time   04/08/21 Medicine 04/08/21    Reference Links    Associated Reports External References   View Encounter Current Health Referral Information   Order Questions    Question Answer Comment   Face to Face Visit Date: 4/8/2021    Follow-up provider for Plan of Care? I treated the patient in an acute care facility and will not continue treatment after discharge.    Follow-up provider: GREER MARINELLI    Reason/Clinical Findings Right humerus fracture, weakness, dialysis patient.    Describe mobility limitations that make leaving home difficult: Right humerus fracture, weakness, dialysis patient.    Nursing/Therapeutic Services Requested Physical Therapy     Occupational Therapy    PT orders: Therapeutic exercise     Strengthening     Gait Training    Weight Bearing Status As Tolerated    Occupational orders: Activities of daily living     Energy conservation     Strengthening     Cognition    Frequency: 1 Week 1           Source Order Set / Preference List    Order Set   [facility] GEN EXPRESS DISCHARGE [[id number]]   Clinical Indications     ICD-10-CM ICD-9-CM   Closed fracture of proximal end of right humerus, unspecified fracture morphology, initial encounter     S42.201A 812.00   Reprint Order Requisition    Ambulatory Referral to Home Health (Order #[id number]) on 4/8/21   Encounter    View Encounter          Order Provider Info        Office phone Pager E-mail   Ordering User Fuentes Cartagena [phone] -- --    Authorizing Provider Fuentes Cartagena [phone] -- --   Attending Provider Fuentes Cartagena [phone] -- --   Linked Charges    No charges linked to this procedure   Tracking Reports  Cosign Tracking Order Transmittal Tracking   Authorized by:  Fuentes Cartagena DO  (NPI: [id number])       Lab Component SmartPhrase Guide    Ambulatory Referral to Home Health (Order #[id number]) on 4/8/21            History & Physical      Tejinder Kent MD at 04/04/21 0130              [facility] Medicine [facility]  HISTORY AND PHYSICAL    Date of Admission: 4/3/2021  Primary Care Physician: Bharati Dahl APRN    Subjective     Chief Complaint: Confusion    History of Present Illness  Patient is a 61-year-old white female past medical history of end-stage renal disease on Tuesday Thursday Saturday dialysis schedule.  She has a significant history of noncompliance with her dialysis.  Apparently she did not go today uncertain when she went last because of increased agitation and confusion.  She has done this in the past and become uremic and encephalopathic.  She was just hospitalized recently for missing dialysis after a fall and having a proximal humeral neck fracture.  She had subsequently been back in the ER due to noncontrolled pain and was prescribed Percocet 2 days prior to presentation now.  Her electrolytes are okay but her BUN and creatinine are up from baseline.  She is also worsening anemia rectal exam revealed positive stools.  Uncertain if she has had any melena or any other problems regarding this is yet unable to get any type of history out of the patient.  Also does moan and mumble and not communicating at all.  No family is present.        Review of Systems   Unable to obtain as patient is encephalopathic  Otherwise complete ROS reviewed and negative except as mentioned in the HPI.    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    • Chronic otitis media    • Diabetes (CMS/[facility])    • End stage renal disease on dialysis (CMS/[facility])    • Eustachian tube dysfunction    • Glaucoma    • HTN (hypertension)    • Mucoid otitis media    • Noncompliance of patient with renal dialysis (CMS/[facility])    • Tobacco abuse      Past Surgical History:  Past Surgical History:   Procedure Laterality Date   • ARTERIOVENOUS FISTULA     • CATARACT EXTRACTION WITH INTRAOCULAR LENS IMPLANT     •  SECTION     • CHOLECYSTECTOMY     • MYRINGOTOMY W/ TUBES Bilateral    • MYRINGOTOMY W/ TUBES Right    • TUBAL ABDOMINAL LIGATION       Social History:  reports that she has been smoking cigarettes. She has a 12.00 pack-year smoking history. She has never used smokeless tobacco. She reports that she does not drink alcohol and does not use drugs.    Family History: family history includes Diabetes in her father; Heart disease in her mother.       Allergies:  Allergies   Allergen Reactions   • Bupropion Nausea Only   • Chantix [Varenicline]    • Sulfa Antibiotics    • Azithromycin Rash   • Levofloxacin Rash   • Penicillins Rash     Medications:  Prior to Admission medications    Medication Sig Start Date End Date Taking? Authorizing Provider   albuterol sulfate  (90 Base) MCG/ACT inhaler Inhale 2 puffs Every 4 (Four) Hours As Needed for Wheezing. 20   Sundeep Aldridge MD   aspirin 81 MG EC tablet Take 81 mg by mouth Daily.    ProviderSteve MD   brimonidine (ALPHAGAN) 0.2 % ophthalmic solution Administer 1 drop into the left eye 3 (Three) Times a Day.    Steve Warren MD   carvedilol (COREG) 6.25 MG tablet Take 6.25 mg by mouth 2 (Two) Times a Day With Meals.    Steve Warren MD   cholecalciferol (VITAMIN D3) 1000 units tablet Take 2,000 Units by mouth Daily.    Steve Warren MD   dorzolamide (TRUSOPT) 2 % ophthalmic solution Administer 1 drop into the left eye 2 (Two) Times a Day.     Steve Warren MD   famotidine (PEPCID) 20 MG tablet Take 1 tablet by mouth 2 (Two) Times a Day. 4/29/20   Lyndon Lockett MD   fluticasone (FLONASE) 50 MCG/ACT nasal spray 2 sprays into the nostril(s) as directed by provider 2 (Two) Times a Day.    Steve Warren MD   insulin glargine (LANTUS) 100 UNIT/ML injection Inject 10 Units under the skin Every Night.    Steve Warren MD   lactobacillus acidophilus (RISAQUAD) capsule capsule Take 1 capsule by mouth Daily. 5/3/20   Lyndon Lockett MD   Latanoprostene Bunod (Vyzulta) 0.024 % solution Administer 1 drop into the left eye Every Night. Left eye     Steve Warren MD   levothyroxine (SYNTHROID, LEVOTHROID) 25 MCG tablet Take 1 tablet by mouth Daily. 3/28/21   Roxie Schneider APRN   losartan (COZAAR) 50 MG tablet Take 1 tablet by mouth Daily. 3/29/21   Roxie Schneider APRN   Netarsudil Dimesylate (RHOPRESSA) 0.02 % solution Administer 1 drop into the left eye Every Night.    Steve Warren MD   nicotine (NICODERM CQ) 21 MG/24HR patch Place 1 patch on the skin as directed by provider Daily. 4/30/20   Lyndon Lockett MD   ondansetron (ZOFRAN) 4 MG tablet Take 1 tablet by mouth Every 6 (Six) Hours As Needed for Nausea or Vomiting. 4/29/20   Lyndon Lockett MD   oxyCODONE-acetaminophen (PERCOCET) 7.5-325 MG per tablet Take 1 tablet by mouth Every 6 (Six) Hours As Needed for Severe Pain . 4/1/21   Jensen Michele MD   pravastatin (PRAVACHOL) 10 MG tablet Take 10 mg by mouth Every Night.    Steve Warren MD   pregabalin (LYRICA) 50 MG capsule Take 50 mg by mouth 2 (Two) Times a Day.    Steve Warren MD   sertraline (ZOLOFT) 25 MG tablet Take 25 mg by mouth Daily.    Steve Warren MD   sevelamer (RENAGEL) 800 MG tablet Take 2,400 mg by mouth 3 (Three) Times a Day With Meals.    Provider, MD Steve   timolol (TIMOPTIC) 0.5 % ophthalmic solution Administer 1 drop into the left eye Every Morning.     "Provider, MD Steve     Objective     Vital Signs: /63   Pulse 88   Temp 97.7 °F (36.5 °C) (Oral)   Resp 13   Ht 165.1 cm (65\")   Wt 67.2 kg (148 lb 2.4 oz)   SpO2 93%   BMI 24.65 kg/m²   Physical Exam   Gen.: Chronically ill-appearing white female in no acute distress moaning  HEENT: Atraumatic, normocephalic.  Pupils equal, round, and reactive to light. Extraocular movements intact.  Sclerae anicteric.  External ears negative.  Mucous membranes moist.  Neck is supple without lymphadenopathy.  No JVD is noted.  No carotid bruits are auscultated.  Oropharynx is without erythema or exudate.   Chest: Diminished breath sounds in the bases rales MCFP up  CV: Tachycardic rate and regular rhythm.  Normal S1-S2.  No gallops, murmurs. or rubs.  Abdomen: Soft, nontender, nondistended.  Active bowel sounds,  No hepatosplenomegaly.  No masses.  No hernias.  Extremities: No clubbing, edema, or cyanosis.  Capillary refill is normal.  Pulses are 2+ and symmetric at radial and dorsalis pedis.  Posterior tibial pulses are intact and 2+ palpable.  AV fistula left arm with thrill  Neuro: Patient is awake, confused unable to complete any further neurologic exam patient is oriented x0   Skin: Warm, dry, and intact.  No evidence of breakdown.  Sensorium: Encephalopathic agitated at times    Nursing notes and vital signs reviewed        Results Reviewed:  Lab Results (last 24 hours)     Procedure Component Value Units Date/Time    Ammonia [[id number]]  (Normal) Collected: 04/04/21 0044    Specimen: Blood Updated: 04/04/21 0128     Ammonia 17 umol/L     Comprehensive Metabolic Panel [[id number]]  (Abnormal) Collected: 04/03/21 2340    Specimen: Blood Updated: 04/04/21 0028     Glucose 100 mg/dL      BUN 45 mg/dL      Creatinine 5.52 mg/dL      Sodium 136 mmol/L      Potassium 4.9 mmol/L      Comment: Slight hemolysis detected by analyzer. Results may be affected.        Chloride 96 mmol/L      CO2 24.0 mmol/L      " Calcium 9.5 mg/dL      Total Protein 6.9 g/dL      Albumin 3.90 g/dL      ALT (SGPT) 9 U/L      AST (SGOT) 26 U/L      Alkaline Phosphatase 206 U/L      Total Bilirubin 0.7 mg/dL      eGFR Non African Amer 8 mL/min/1.73      Comment: <15 Indicative of kidney failure.        eGFR   Amer --     Comment: <15 Indicative of kidney failure.        Globulin 3.0 gm/dL      A/G Ratio 1.3 g/dL      BUN/Creatinine Ratio 8.2     Anion Gap 16.0 mmol/L     Narrative:      GFR Normal >60  Chronic Kidney Disease <60  Kidney Failure <15      BNP [[id number]]  (Abnormal) Collected: 04/03/21 2340    Specimen: Blood Updated: 04/04/21 0022     proBNP 58,602.0 pg/mL     Narrative:      Among patients with dyspnea, NT-proBNP is highly sensitive for the detection of acute congestive heart failure. In addition NT-proBNP of <300 pg/ml effectively rules out acute congestive heart failure with 99% negative predictive value.    Results may be falsely decreased if patient taking Biotin.      Troponin [[id number]]  (Abnormal) Collected: 04/03/21 2340    Specimen: Blood Updated: 04/04/21 0019     Troponin T 0.099 ng/mL     Narrative:      Troponin T Reference Range:  <= 0.03 ng/mL-   Negative for AMI  >0.03 ng/mL-     Abnormal for myocardial necrosis.  Clinicians would have to utilize clinical acumen, EKG, Troponin and serial changes to determine if it is an Acute Myocardial Infarction or myocardial injury due to an underlying chronic condition.       Results may be falsely decreased if patient taking Biotin.      COVID-19,[facility] IN-HOUSE,Nasal Swab No Transport Media 3-4 HR TAT - Swab, Nasal Cavity [[id number]]  (Normal) Collected: 04/03/21 2216    Specimen: Swab from Nasal Cavity Updated: 04/03/21 2306     COVID19 Not Detected    Narrative:      Fact sheet for providers: https://www.fda.gov/media/540624/download     Fact sheet for patients: https://www.fda.gov/media/809039/download    Test performed by PCR.    Consider negative results in  combination with clinical observations, patient history, and epidemiological information.    [facility]ius Draw [[id number]] Collected: 04/03/21 2155    Specimen: Blood Updated: 04/03/21 2300    Narrative:      The following orders were created for panel order [facility]ius Draw.  Procedure                               Abnormality         Status                     ---------                               -----------         ------                     Light Blue Top[[id number]]                                   Final result               Green Top (Gel)[[id number]]                                  Final result               Lavender Top[[id number]]                                     Final result               Red Top[[id number]]                                          Final result               Gray Top - Ice[[id number]]                                   Final result                 Please view results for these tests on the individual orders.    Lavender Top [[id number]] Collected: 04/03/21 2155    Specimen: Blood Updated: 04/03/21 2300     Extra Tube hold for add-on     Comment: Auto resulted       Gray Top - Ice [[id number]] Collected: 04/03/21 2155    Specimen: Blood Updated: 04/03/21 2300     Extra Tube Hold for add-ons.     Comment: Auto resulted.       Light Blue Top [[id number]] Collected: 04/03/21 2155    Specimen: Blood Updated: 04/03/21 2300     Extra Tube hold for add-on     Comment: Auto resulted       Green Top (Gel) [[id number]] Collected: 04/03/21 2155    Specimen: Blood Updated: 04/03/21 2300     Extra Tube Hold for add-ons.     Comment: Auto resulted.       Red Top [[id number]] Collected: 04/03/21 2155    Specimen: Blood Updated: 04/03/21 2300     Extra Tube Hold for add-ons.     Comment: Auto resulted.       POC Occult Blood Stool [[id number]]  (Abnormal) Collected: 04/03/21 2255    Specimen: Stool Updated: 04/03/21 2256     Fecal Occult Blood Positive     Lot Number \[id number]\     Expiration Date \12/31/2021\     DEVELOPER  LOT NUMBER \195\     DEVELOPER EXPIRATION DATE \12/31/2021\     Positive Control Positive     Negative Control Negative    Urinalysis, Microscopic Only - Urine, Catheter In/Out [[id number]]  (Abnormal) Collected: 04/03/21 2220    Specimen: Urine, Catheter In/Out Updated: 04/03/21 2249     RBC, UA 0-2 /HPF      WBC, UA 0-2 /HPF      Bacteria, UA Trace /HPF      Squamous Epithelial Cells, UA None Seen /HPF      Hyaline Casts, UA None Seen /LPF      Amorphous Crystals, UA Moderate/2+ /HPF      Methodology Manual Light Microscopy    Urine Drug Screen - Urine, Catheter [[id number]]  (Abnormal) Collected: 04/03/21 2221    Specimen: Urine, Catheter Updated: 04/03/21 2241     THC, Screen, Urine Negative     Phencyclidine (PCP), Urine Negative     Cocaine Screen, Urine Negative     Methamphetamine, Ur Negative     Opiate Screen Negative     Amphetamine Screen, Urine Negative     Benzodiazepine Screen, Urine Negative     Tricyclic Antidepressants Screen Negative     Methadone Screen, Urine Negative     Barbiturates Screen, Urine Negative     Oxycodone Screen, Urine Positive     Propoxyphene Screen Negative     Buprenorphine, Screen, Urine Negative    Narrative:      Cutoff For Drugs Screened:    Amphetamines               500 ng/ml  Barbiturates               200 ng/ml  Benzodiazepines            150 ng/ml  Cocaine                    150 ng/ml  Methadone                  200 ng/ml  Opiates                    100 ng/ml  Phencyclidine               25 ng/ml  THC                            50 ng/ml  Methamphetamine            500 ng/ml  Tricyclic Antidepressants  300 ng/ml  Oxycodone                  100 ng/ml  Propoxyphene               300 ng/ml  Buprenorphine               10 ng/ml    The normal value for all drugs tested is negative. This report includes unconfirmed screening results, with the cutoff values listed, to be used for medical treatment purposes only.  Unconfirmed results must not be used for non-medical purposes  such as employment or legal testing.  Clinical consideration should be applied to any drug of abuse test, particularly when unconfirmed results are used.      Urinalysis With Culture If Indicated - Urine, Catheter In/Out [[id number]]  (Abnormal) Collected: 04/03/21 2220    Specimen: Urine, Catheter In/Out Updated: 04/03/21 2236     Color, UA Dark Yellow     Appearance, UA Clear     pH, UA 6.0     Specific Gravity, UA 1.020     Glucose,  mg/dL (1+)     Ketones, UA Negative     Bilirubin, UA Negative     Blood, UA Small (1+)     Protein, UA >=300 mg/dL (3+)     Leuk Esterase, UA Trace     Nitrite, UA Negative     Urobilinogen, UA 0.2 E.U./dL    Salicylate Level [[id number]]  (Normal) Collected: 04/03/21 2155    Specimen: Blood Updated: 04/03/21 2231     Salicylate <0.3 mg/dL     Acetaminophen Level [[id number]]  (Normal) Collected: 04/03/21 2155    Specimen: Blood Updated: 04/03/21 2231     Acetaminophen <5.0 mcg/mL     Magnesium [[id number]]  (Abnormal) Collected: 04/03/21 2155    Specimen: Blood Updated: 04/03/21 2223     Magnesium 2.5 mg/dL     Protime-INR [[id number]]  (Abnormal) Collected: 04/03/21 2155    Specimen: Blood Updated: 04/03/21 2217     Protime 14.4 Seconds      INR 1.16    CBC & Differential [[id number]]  (Abnormal) Collected: 04/03/21 2155    Specimen: Blood Updated: 04/03/21 2210    Narrative:      The following orders were created for panel order CBC & Differential.  Procedure                               Abnormality         Status                     ---------                               -----------         ------                     CBC Auto Differential[[id number]]        Abnormal            Final result                 Please view results for these tests on the individual orders.    CBC Auto Differential [[id number]]  (Abnormal) Collected: 04/03/21 2155    Specimen: Blood Updated: 04/03/21 2210     WBC 3.77 10*3/mm3      RBC 3.16 10*6/mm3      Hemoglobin 9.7 g/dL      Hematocrit 30.3 %       MCV 95.9 fL      MCH 30.7 pg      MCHC 32.0 g/dL      RDW 16.6 %      RDW-SD 57.3 fl      MPV 9.6 fL      Platelets 177 10*3/mm3      Neutrophil % 58.6 %      Lymphocyte % 20.4 %      Monocyte % 14.1 %      Eosinophil % 5.8 %      Basophil % 0.8 %      Immature Grans % 0.3 %      Neutrophils, Absolute 2.21 10*3/mm3      Lymphocytes, Absolute 0.77 10*3/mm3      Monocytes, Absolute 0.53 10*3/mm3      Eosinophils, Absolute 0.22 10*3/mm3      Basophils, Absolute 0.03 10*3/mm3      Immature Grans, Absolute 0.01 10*3/mm3      nRBC 0.0 /100 WBC     Blood Gas, Arterial - [[id number]]  (Abnormal) Collected: 04/03/21 2201    Specimen: Arterial Blood Updated: 04/03/21 2207     Site Right Radial     Rustam's Test Positive     pH, Arterial 7.430 pH units      pCO2, Arterial 39.3 mm Hg      pO2, Arterial 72.7 mm Hg      Comment: 84 Value below reference range        HCO3, Arterial 26.1 mmol/L      Comment: 83 Value above reference range        Base Excess, Arterial 1.7 mmol/L      O2 Saturation, Arterial 96.4 %      Temperature 37.0 C      Barometric Pressure for Blood Gas 758 mmHg      Modality Room Air     Ventilator Mode NA     Collected by [id number]     Comment: Meter: [id number]     :  [id number]        pCO2, Temperature Corrected 39.3 mm Hg      pH, Temp Corrected 7.430 pH Units      pO2, Temperature Corrected 72.7 mm Hg     POC Glucose Once [[id number]]  (Normal) Collected: 04/03/21 2139    Specimen: Blood Updated: 04/03/21 2150     Glucose 130 mg/dL      Comment: : [id number] Garland NatalieMeter ID: [id number]           Imaging Results (Last 24 Hours)     Procedure Component Value Units Date/Time    XR Chest 1 View [[id number]] Resulted: 04/03/21 2242     Updated: 04/03/21 2242    CT Head Without Contrast [[id number]] Resulted: 04/03/21 2210     Updated: 04/03/21 2214        I have personally reviewed and interpreted the radiology studies and ECG obtained at time of admission.     Assessment / Plan     Assessment:    Active Hospital Problems    Diagnosis    • **Encephalopathy, metabolic, due to uremia    • Altered mental status    • Heme positive stool    • Closed fracture of right proximal humerus    • Non-compliance with renal dialysis (CMS/[facility])    • Anemia due to chronic kidney disease, on chronic dialysis (CMS/[facility])    • Type 2 diabetes mellitus, with long-term current use of insulin (CMS/[facility])    • End stage renal failure on dialysis (CMS/[facility])    • HTN (hypertension)    1.  Uremic encephalopathy post admit the patient Monia level normal will consult nephrology for dialysis.  Hold medications that can cause confusion such as Percocet and Lyrica.  2.  Heme positive stool patient has been started on Protonix drip will check substrates, continue Protonix for now, consult GI for recommendations, serial H&H's type and screen transfuse as needed.  We will also hold aspirin.  3.  Type 2 diabetes Accu-Chek sliding scale insulin check A1c.  Resume home medications as appropriate.  4.  End-stage renal disease on dialysis resume dialysis encourage compliance.  5.  Hypertension currently well controlled continue current medications monitor BP.  6.  Closed fracture right proximal humerus patient is not currently wearing a sling we will get her 1.  Encourage compliance with this.      Patient seen after midnight         Code Status: Full     I discussed the patient's findings and my recommendations with the ER physician.  Unable to determine who surrogate healthcare decision maker is at this time.    Estimated length of stay greater than 2 nights.    Patient seen and examined by me on April 4, 2021 at 1:30 AM.    Electronically signed by Tejinder Kent MD, 04/04/21, 02:15 CDT.                Electronically signed by Tejinder Kent MD at 04/04/21 0222          Physician Progress Notes (most recent note)      Ten Boyd MD at 04/07/21 1342          PROGRESS NOTE.      Patient:  Mini Gentile  Date of Birth:  1960  Date of Service: 4/7/2021  MRN: [id number]   Acct: [id number]   Primary Care Physician: Bharati Dahl APRN  Advance Directive:   Code Status and Medical Interventions:   Ordered at: 04/04/21 0213     Code Status:    CPR     Medical Interventions (Level of Support Prior to Arrest):    Full     Admit Date: 4/3/2021       Hospital Day: 3  Referring Provider: No ref. provider found      Patient Seen, Chart, Consults, Notes, Labs, Radiology studies reviewed.      Subjective:  Mini Gentile is a 61 y.o. female  whom we were consulted for end-stage renal disease.  She goes to [place] dialysis clinic on Tuesday Thursday Saturday.  Apparently she was not sure yesterday.  She has been noncompliant lately.  Presented as she was brought in by family as she was found to be confused, disoriented.  She has been on Percocet for pain control for right humeral neck fracture.  Patient has type 2 diabetes, hypertension, COPD, tobacco use and anemia of chronic kidney disease. She was initially admitted to ICU. She was sleepy/confused/encephalopathy.  Patient received dialysis that was well-tolerated. She has since moved to the medical floor.  Today, she appeared to be comfortable with no new complaints.    Allergies:  Bupropion, Chantix [varenicline], Sulfa antibiotics, Azithromycin, Levofloxacin, and Penicillins    Home Meds:  Medications Prior to Admission   Medication Sig Dispense Refill Last Dose   • traMADol (ULTRAM) 50 MG tablet Take 50 mg by mouth Every 8 (Eight) Hours As Needed for Moderate Pain .      • albuterol sulfate  (90 Base) MCG/ACT inhaler Inhale 2 puffs Every 4 (Four) Hours As Needed for Wheezing. 18 g 0 Unknown at Unknown time   • aspirin 81 MG EC tablet Take 81 mg by mouth Daily.   Unknown at Unknown time   • atropine 1 % ophthalmic solution Administer 1 drop into the left eye Daily.   Unknown at Unknown time   • brimonidine (ALPHAGAN) 0.2 % ophthalmic solution Administer 1 drop into the  left eye 3 (Three) Times a Day.   Unknown at Unknown time   • carvedilol (COREG) 6.25 MG tablet Take 6.25 mg by mouth 2 (Two) Times a Day With Meals.   Unknown at Unknown time   • cholecalciferol (VITAMIN D3) 1000 units tablet Take 2,000 Units by mouth Daily.   Unknown at Unknown time   • dorzolamide (TRUSOPT) 2 % ophthalmic solution Administer 1 drop into the left eye 2 (Two) Times a Day.   Unknown at Unknown time   • famotidine (PEPCID) 20 MG tablet Take 1 tablet by mouth 2 (Two) Times a Day. 60 tablet 2 Unknown at Unknown time   • fluticasone (FLONASE) 50 MCG/ACT nasal spray 2 sprays into the nostril(s) as directed by provider 2 (Two) Times a Day.   Unknown at Unknown time   • insulin glargine (LANTUS) 100 UNIT/ML injection Inject 10 Units under the skin Every Night.   Unknown at Unknown time   • ipratropium (ATROVENT) 0.02 % nebulizer solution Take 500 mcg by nebulization Every 4 (Four) Hours As Needed for Wheezing or Shortness of Air.   Unknown at Unknown time   • lactobacillus acidophilus (RISAQUAD) capsule capsule Take 1 capsule by mouth Daily. 30 capsule 2 Unknown at Unknown time   • Latanoprostene Bunod (Vyzulta) 0.024 % solution Administer 1 drop into the left eye Every Night. Left eye    Unknown at Unknown time   • levothyroxine (SYNTHROID, LEVOTHROID) 25 MCG tablet Take 1 tablet by mouth Daily. 30 tablet 0 Unknown at Unknown time   • losartan (COZAAR) 50 MG tablet Take 50 mg by mouth 2 (Two) Times a Day.   Unknown at Unknown time   • Netarsudil Dimesylate (RHOPRESSA) 0.02 % solution Administer 1 drop into the left eye Every Night.   Unknown at Unknown time   • nicotine (NICODERM CQ) 21 MG/24HR patch Place 1 patch on the skin as directed by provider Daily. 30 patch 2 Unknown at Unknown time   • ondansetron (ZOFRAN) 4 MG tablet Take 1 tablet by mouth Every 6 (Six) Hours As Needed for Nausea or Vomiting. 24 tablet 2 Unknown at Unknown time   • oxyCODONE-acetaminophen (PERCOCET) 7.5-325 MG per tablet Take 1  tablet by mouth Every 6 (Six) Hours As Needed for Severe Pain . 12 tablet 0 Unknown at Unknown time   • pravastatin (PRAVACHOL) 10 MG tablet Take 10 mg by mouth Every Night.   Unknown at Unknown time   • pregabalin (LYRICA) 50 MG capsule Take 50 mg by mouth 2 (Two) Times a Day.   Unknown at Unknown time   • sertraline (ZOLOFT) 25 MG tablet Take 25 mg by mouth Daily.   Unknown at Unknown time   • sevelamer (RENAGEL) 800 MG tablet Take 2,400 mg by mouth 3 (Three) Times a Day With Meals.   Unknown at Unknown time   • timolol (TIMOPTIC) 0.5 % ophthalmic solution Administer 1 drop into the left eye Every Morning.          Medicines:  Current Facility-Administered Medications   Medication Dose Route Frequency Provider Last Rate Last Admin   • acetaminophen (TYLENOL) tablet 650 mg  650 mg Oral Q4H PRN Fuentes Cartagena DO        Or   • acetaminophen (TYLENOL) suppository 650 mg  650 mg Rectal Q4H PRN Fuentes Cartagena DO       • atropine 1 % ophthalmic solution 1 drop  1 drop Left Eye Daily Fuentes Cartagena DO   1 drop at 04/07/21 0834   • brimonidine (ALPHAGAN) 0.2 % ophthalmic solution 1 drop  1 drop Left Eye TID Fuentes Cartagena DO   1 drop at 04/07/21 0834   • dextrose (D50W) 25 g/ 50mL Intravenous Solution 25 g  25 g Intravenous Q15 Min PRN Fuentes Cartagena DO   25 g at 04/04/21 1106   • dextrose (GLUTOSE) oral gel 15 g  15 g Oral Q15 Min PRN Fuentes Cartagena DO       • dorzolamide (TRUSOPT) 2 % ophthalmic solution 1 drop  1 drop Left Eye BID Fuentes Cartagena DO   1 drop at 04/07/21 0834   • fluticasone (FLONASE) 50 MCG/ACT nasal spray 2 spray  2 spray Nasal BID Fuentes Cartagena DO   2 spray at 04/07/21 0837   • glucagon (human recombinant) (GLUCAGEN DIAGNOSTIC) injection 1 mg  1 mg Subcutaneous Q15 Min PRN Fuentes Cartagena DO       • insulin detemir (LEVEMIR) injection 10 Units  10 Units Subcutaneous Nightly Fuentes Cartagena DO   10 Units at 04/06/21 2200   • insulin lispro (humaLOG) injection 0-9 Units  0-9 Units  Subcutaneous TID AC Fuentes Cartagena DO   2 Units at 04/06/21 1729   • ipratropium (ATROVENT) nebulizer solution 0.5 mg  500 mcg Nebulization Q4H PRN Fuentes Cartagena DO       • ipratropium-albuterol (DUO-NEB) nebulizer solution 3 mL  3 mL Nebulization Q6H PRN Fuentes Cartagena DO       • latanoprost (XALATAN) 0.005 % ophthalmic solution 1 drop  1 drop Left Eye Nightly Fuentes Cartagena DO   1 drop at 04/06/21 2158   • levothyroxine (SYNTHROID, LEVOTHROID) tablet 25 mcg  25 mcg Oral Q AM Fuentes Cartagena DO   25 mcg at 04/07/21 0606   • nicotine (NICODERM CQ) 21 MG/24HR patch 1 patch  1 patch Transdermal Q AM Fuentes Cartagena DO   1 patch at 04/07/21 0606   • oxyCODONE-acetaminophen (PERCOCET) 7.5-325 MG per tablet 1 tablet  1 tablet Oral Q6H PRN Fuentes Cartagena DO       • pantoprazole (PROTONIX) EC tablet 40 mg  40 mg Oral Q AM Fuentes Cartagena DO   40 mg at 04/07/21 0606   • pravastatin (PRAVACHOL) tablet 10 mg  10 mg Oral Nightly Fuentes Cartagena DO   10 mg at 04/06/21 2155   • pregabalin (LYRICA) capsule 50 mg  50 mg Oral BID Fuentes Cartagena DO   50 mg at 04/07/21 1208   • sertraline (ZOLOFT) tablet 25 mg  25 mg Oral Daily Fuentes Cartagena DO   25 mg at 04/07/21 1300   • sevelamer (RENVELA) tablet 2,400 mg  2,400 mg Oral TID With Meals Fuentes Cartagena DO   2,400 mg at 04/07/21 1208   • sodium chloride 0.9 % flush 10 mL  10 mL Intravenous Q12H Fuentes Cartagena DO   10 mL at 04/07/21 0835   • sodium chloride 0.9 % flush 10 mL  10 mL Intravenous PRN Fuentes Cartagena DO       • timolol (TIMOPTIC) 0.5 % ophthalmic solution 1 drop  1 drop Left Eye Q AM Fuentes Cartagena, DO   1 drop at 04/07/21 0606       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    • Chronic otitis media    • Diabetes (CMS/HCC)    • End stage renal disease on dialysis (CMS/HCC)    • Eustachian tube dysfunction    • Glaucoma    • HTN (hypertension)    • Mucoid otitis media    •  "Noncompliance of patient with renal dialysis (CMS/[facility])    • Tobacco abuse        Past Surgical History:  Past Surgical History:   Procedure Laterality Date   • ARTERIOVENOUS FISTULA     • CATARACT EXTRACTION WITH INTRAOCULAR LENS IMPLANT     •  SECTION     • CHOLECYSTECTOMY     • MYRINGOTOMY W/ TUBES Bilateral    • MYRINGOTOMY W/ TUBES Right    • TUBAL ABDOMINAL LIGATION         Family History  Family History   Problem Relation Age of Onset   • Heart disease Mother    • Diabetes Father        Social History  Social History     Socioeconomic History   • Marital status: Single     Spouse name: Not on file   • Number of children: Not on file   • Years of education: Not on file   • Highest education level: Not on file   Tobacco Use   • Smoking status: Current Every Day Smoker     Packs/day: 2.00     Years: 6.00     Pack years: 12.00     Types: Cigarettes   • Smokeless tobacco: Never Used   Substance and Sexual Activity   • Alcohol use: No   • Drug use: No   • Sexual activity: Defer       Review of Systems:  History obtained from chart review and the patient  General ROS: No fever or chills  Respiratory ROS: No cough, +shortness of breath,- wheezing  Cardiovascular ROS: no chest pain but dyspnea on exertion  Gastrointestinal ROS: No abdominal pain or melena  Genito-Urinary ROS: No dysuria or hematuria  Musculoskeletal: negative  Skin: negative    Objective:  BP 90/47 (BP Location: Left arm, Patient Position: Lying)   Pulse 71   Temp 98.2 °F (36.8 °C) (Oral)   Resp 18   Ht 165.1 cm (65\")   Wt 59.4 kg (131 lb)   SpO2 96%   BMI 21.80 kg/m²     Intake/Output Summary (Last 24 hours) at 2021 1342  Last data filed at 2021 1300  Gross per 24 hour   Intake 570 ml   Output --   Net 570 ml       Physical examination:  General: awake/alert   Chest:eased breath sound on both lung field.  CVS: regular rate and rhythm  Abdominal: soft, nontender, normal bowel sounds  Extremities: no cyanosis or edema  Skin: warm " and dry without rash  Neuro: No focal motor deficits    Labs:  Lab Results (last 24 hours)     Procedure Component Value Units Date/Time    POC Glucose Once [[id number]]  (Normal) Collected: 04/07/21 1053    Specimen: Blood Updated: 04/07/21 1113     Glucose 129 mg/dL      Comment: : [id number] Lizeth MaxiaMeter ID: [id number]       POC Glucose Once [[id number]]  (Normal) Collected: 04/07/21 0718    Specimen: Blood Updated: 04/07/21 0739     Glucose 85 mg/dL      Comment: : [id number] Lizeth MikiaMeter ID: [id number]       POC Glucose Once [[id number]]  (Abnormal) Collected: 04/06/21 2056    Specimen: Blood Updated: 04/06/21 2110     Glucose 210 mg/dL      Comment: : [id number] Lummus TerryMeter ID: [id number]             Radiology:   Imaging Results (Last 24 Hours)     ** No results found for the last 24 hours. **              Assessment   1.  End-stage renal disease on maintenance dialysis.  2.  Type II diabetic nephropathy.  3.  Noncompliant dialysis patient.  4.  Metabolic encephalopathy.  5.  Anemia of chronic kidney disease.  6.  Recent right humeral fracture.    Plan:  Continue dialysis thrice weekly.  Follow-up labs.      Ten Boyd MD  4/7/2021  13:42 CDT    Electronically signed by Ten Boyd MD at 04/07/21 1343          Consult Notes (most recent note)      Mustapha Carter MD at 04/04/21 1907      Consult Orders    1. Inpatient Gastroenterology Consult [[id number]] ordered by Tejinder Kent MD at 04/04/21 0213               Inpatient Gastroenterology Service    Consultation    Mustapha Carter MD, FACP        Patient referred for evaluation of (+) FOB  History is obtained fromthe EMR, the nursing staf        Chief complaint  -no GI complaint at the time of my visit        History of Present Illness  hemodialysis patient missed HD and developed mental status changes.  Found to have (+)FOB    -no report of hematemesis, hematochezia, melena, nausea, emesis,  pyrosis, regurgitation, dysphagia, odynophagia, altered bowel habit, change in stool, choluria, acholic stool, or jaundice        Past Medical History  -DM  -ESRD/HD  -glaucoma        Past Surgical History  -TL  -ECCE + IOL  -reilly  -c-sec  -TM tubes        Past Endoscopy History  -unknown        Family History  -NC:  no report of family history of: gastric cancer, pancreatic cancer, colorectal cancer, breast cancer, ovarian cancer, uterine cancer, colorectal polyps, inflammatory bowel disease, Crohn's disease, ulcerative colitis, peptic ulcer disease, pancreatitis, hepatitis, cirrhosis        Social History  -single  -tobacco use: 2 ppd        Medications  -see EMR lists        Physical Exam  General  -appears greater than stated age  -moderate distress    HEENT  -normocephalic  -atraumatic  -no drainage  -no bleeding  -mucosa appears moist     Neurological  -somnulent  -not oriented  -slowed response  -no tremor  -no obvious focal deficit  -moves all extremities without obvious abnormality    Psychiatric  -disoriented    Skin (evaluation limited to exposed skin)  -dry  -no diaphoresis  -no icterus  -no rash     Neck  -supple  -normal passive ROM  -no JVD    Pulmonary  -normal respiratory effort  -no respiratory distress  -no stridor    Cardiovascular  -normal rate  -regular rhythm    Abdomen  -benign  -soft        Laboratory of Note  -Hb has varied with volume changes: 9.7>>>10.2>>>10.0>>>8.5  -BUN 47, Cr 5.91, ratio 8          Assessment  -(+) FOB without overt GI hemorrhage in this patient with chronic anemia/ESRD          Recommendations  -IV PPi  -follow H&H, transfuse as clinically appropriate  -type and hold at least two units to be rapidly available should brisk bleeding develop  -clear liquid diet  -no plan for EGD at present  -avoid agents toxic to GI tract mucosa, anticoagulant/antiplatelet agents, as clinically practicable  -elective GI evaluation as an outpatient post-convalescence          Thank you  for allowing us to participate in the care of this patient.    Sincerely,    REED Carter MD, FACP                                  Electronically signed by Mustapha Carter MD at 04/04/21 1916            Discharge Summary      Fuentes Cartagena DO at 04/08/21 1106                [facility] Medicine Services  DISCHARGE SUMMARY       Date of Admission: 4/3/2021  Date of Discharge:  4/8/2021  Primary Care Physician: Bharati Dahl APRN    Presenting Problem/History of Present Illness:  Altered mental status.     Final Discharge Diagnoses:  Active Hospital Problems    Diagnosis    • **Encephalopathy, metabolic, due to uremia    • End stage renal failure on dialysis (CMS/[facility])    • Non-compliance with renal dialysis (CMS/[facility])    • Heme positive stool    • Anemia due to chronic kidney disease, on chronic dialysis (CMS/[facility])    • Closed fracture of right proximal humerus    • Type 2 diabetes mellitus, with long-term current use of insulin (CMS/[facility])    • HTN (hypertension)      Consults: Dr. Caballero and Dr. Boyd with nephrology.     Procedures Performed: None.     Pertinent Test Results:   Imaging Results (All)     Procedure Component Value Units Date/Time    SCANNED - IMAGING [[id number]] Resulted: 04/03/21     Updated: 04/05/21 0237    CT Head Without Contrast [[id number]] Collected: 04/04/21 0746     Updated: 04/04/21 0754    Narrative:      Exam: CT HEAD WO CONTRAST-     Indication: Altered mental status     Comparison: 3/26/2021     Dose length product: 685 mGy cm. Automated exposure control was also  utilized to decrease patient radiation dose.     Findings:     No acute intracranial hemorrhage. No loss of gray-white differentiation.  Chronic microvascular ischemic white matter change and global cerebral  volume loss. No midline shift or mass effect. LEFT globe prosthetic  device. No acute orbital finding. Small amount of gas in the RIGHT  temporal soft tissues is likely venous related  to IV placement. Mastoid  air cells and visualized paranasal sinuses are clear. No acute osseous  finding.       Impression:         1.  No acute intracranial findings.  2.  Global cerebral volume loss and presumed chronic microvascular  ischemic white matter change.  3.  RIGHT temporal soft tissue gas, likely related to IV placement.     These findings are in agreement with the critical and emergent findings  from the [facility] preliminary report.  This report was finalized on 04/04/2021 07:51 by Dr. Vasquez Villanueva MD.    XR Chest 1 View [[id number]] Collected: 04/04/21 0643     Updated: 04/04/21 0648    Narrative:      Exam: XR CHEST 1 VW-     Indication: Weak/Dizzy/AMS triage protocol     Comparison: 3/26/2021     Findings:     Cardiac silhouette is stable. No change in small to moderate RIGHT  pleural effusion with overlying atelectasis/consolidation. The LEFT lung  and pleural space appear clear. No visible pneumothorax. LEFT subclavian  stent noted. Redemonstrated RIGHT humeral surgical neck fracture with  involvement of the greater and lesser tuberosities.       Impression:      Impression:     No change in small to moderate RIGHT pleural effusion with overlying  atelectasis/consolidation.  This report was finalized on 04/04/2021 06:45 by Dr. Vasquez Villanueva MD.        LAB RESULTS:      Lab 04/08/21  0415 04/06/21  0343 04/05/21  0703 04/05/21  0027 04/04/21  1148 04/04/21  0753 04/04/21  0247 04/03/21  2155   WBC 5.96 3.99  --  3.24*  --   --  3.73 3.77   HEMOGLOBIN 9.2* 9.1* 9.6* 9.9* 8.5* 10.0* 10.2* 9.7*   HEMATOCRIT 29.0* 28.4* 30.6* 31.5* 27.1* 31.1* 32.8* 30.3*   PLATELETS 158 151  --  155  --   --  162 177   NEUTROS ABS  --   --   --   --   --   --  2.25 2.21   IMMATURE GRANS (ABS)  --   --   --   --   --   --  0.02 0.01   LYMPHS ABS  --   --   --   --   --   --  0.76 0.77   MONOS ABS  --   --   --   --   --   --  0.49 0.53   EOS ABS  --   --   --   --   --   --  0.19 0.22   MCV 97.3* 95.6  --  97.2*  --    --  97.9* 95.9   PROCALCITONIN  --   --   --   --   --  0.23  --   --    PROTIME  --   --   --   --   --   --   --  14.4         Lab 04/08/21  0415 04/06/21  0343 04/05/21  0027 04/04/21  0247 04/03/21 2340 04/03/21 2155   SODIUM 129* 137 137 137 136  --    POTASSIUM 5.8* 4.1 4.3 4.7 4.9  --    CHLORIDE 90* 99 97* 98 96*  --    CO2 22.0 25.0 21.0* 24.0 24.0  --    ANION GAP 17.0* 13.0 19.0* 15.0 16.0*  --    BUN 36* 21 24* 47* 45*  --    CREATININE 5.26* 2.69* 3.72* 5.91* 5.52*  --    GLUCOSE 127* 215* 96 89 100*  --    CALCIUM 9.4 9.2 9.0 9.5 9.5  --    MAGNESIUM  --   --   --  2.5*  --  2.5*   PHOSPHORUS  --   --   --  5.3* 5.2*  --    HEMOGLOBIN A1C  --   --   --   --   --  5.80*   TSH  --   --   --   --  4.570*  --          Lab 04/05/21  0027 04/04/21 0247 04/03/21 2340   TOTAL PROTEIN 6.3 6.8 6.9   ALBUMIN 3.30* 3.60 3.90   GLOBULIN 3.0 3.2 3.0   ALT (SGPT) 13 15 9   AST (SGOT) 27 40* 26   BILIRUBIN 1.1 0.9 0.7   ALK PHOS 212* 229* 206*         Lab 04/04/21  1148 04/04/21  0753 04/03/21  2340 04/03/21 2155   PROBNP  --   --  58,602.0*  --    TROPONIN T 0.082* 0.091* 0.099*  --    PROTIME  --   --   --  14.4   INR  --   --   --  1.16*             Lab 04/04/21  0044 04/03/21 2340   IRON  --  41   IRON SATURATION  --  13*   TIBC  --  326   TRANSFERRIN  --  219   FERRITIN  --  641.90*   FOLATE  --  8.59   VITAMIN B 12  --  479   ABO TYPING A  --    RH TYPING Positive  --    ANTIBODY SCREEN Negative  --          Lab 04/03/21  2201   PH, ARTERIAL 7.430   PCO2, ARTERIAL 39.3   PO2 ART 72.7*   O2 SATURATION ART 96.4   HCO3 ART 26.1*   BASE EXCESS ART 1.7     Brief Urine Lab Results  (Last result in the past 365 days)      Color   Clarity   Blood   Leuk Est   Nitrite   Protein   CREAT   Urine HCG        04/03/21 2220 Dark Yellow Clear Small (1+) Trace Negative >=300 mg/dL (3+)             Microbiology Results (last 10 days)     Procedure Component Value - Date/Time    COVID-19,[facility] IN-HOUSE,Nasal Swab No  Transport Media 3-4 HR TAT - Swab, Nasal Cavity [[id number]]  (Normal) Collected: 04/03/21 2216    Lab Status: Final result Specimen: Swab from Nasal Cavity Updated: 04/03/21 2306     COVID19 Not Detected    Narrative:      Fact sheet for providers: https://www.fda.gov/media/660953/download     Fact sheet for patients: https://www.fda.gov/media/865831/download    Test performed by PCR.    Consider negative results in combination with clinical observations, patient history, and epidemiological information.        Hospital Course:  The patient is known to the service.  She was just in the hospital and discharged on 3/28.  At that time, she had fallen and was found to have a minimally displaced fracture of the proximal end of the right humerus.  She was admitted for orthopedic evaluation and dialysis.  She was ultimately dismissed in stable condition.  Apparently, since that time, she has not done as well as usual and has also missed dialysis recently.  She presented with what was felt to be uremic encephalopathy on 4/3 and was admitted by Dr. Kent.     Nephrology consulted.  She typically dialyzes on a Tuesday, Thursday, and Saturday schedule. She missed dialysis on Saturday, 4/3.  She was dialyzed on 4/4 and 4/5.  Dialysis again today as it is her regular schedule.    She did have some new hyponatremia and hyperkalemia on labs this morning, but these should be corrected with her dialysis treatment today.     She has problems with anemia of chronic disease secondary to her renal dysfunction.  She had a fecal occult blood test done in the emergency department that was positive.  She was started on a Protonix drip and GI was consulted. No plans for endoscopy at this point according to GI.  She did have a dark bowel movement on the morning of 4/6 according to the nursing staff. Discontinued the Protonix drip and placed her on oral Protonix on 4/5.  She usually takes Pepcid at home, but we will have her on Protonix  "moving forward.     She recently suffered a humerus fracture.  She was evaluated by orthopedics on her previous hospitalization.  She was placed in a sling for conservative management.  She has upcoming follow-up in the office for repeat x-rays.     Her most recent hemoglobin A1c was 5.8.  She takes Lantus in the home setting.  Continue Accu-Cheks and sliding scale insulin.  Lowest glucose was 85.     Reconciled and resumed other home medications as appropriate.     PT/OT followed again.     Transferred to the floor on 4/5.    There was some discussion about her going to skilled nursing at discharge, but she and her family no longer wish to do this.  They want for her to return home with family assistance and home health.  She is stable for discharge today.  She is out of pain medication for her right arm so I have sent her prescription for this.  She needs to follow-up with her primary care provider in 1 week and see orthopedics as scheduled.  She needs to be compliant with her dialysis.    Physical Exam on Discharge:  /55 (BP Location: Left arm, Patient Position: Lying)   Pulse 80   Temp 98 °F (36.7 °C) (Oral)   Resp 18   Ht 165.1 cm (65\")   Wt 59.9 kg (132 lb 1 oz)   SpO2 97%   BMI 21.98 kg/m²   Physical Exam  Constitutional:       Appearance: She is well-developed.      Comments: Up in bed.  Seen on dialysis.  No distress.  No family present.  Discussed with her nurse, Vivian.   HENT:      Head: Normocephalic and atraumatic.   Eyes:      Conjunctiva/sclera: Conjunctivae normal.      Pupils: Pupils are equal, round, and reactive to light.   Neck:      Vascular: No JVD.   Cardiovascular:      Rate and Rhythm: Normal rate and regular rhythm.      Heart sounds: Normal heart sounds. No murmur heard.   No friction rub. No gallop.       Comments: Left upper extremity AVF accessed for HD.  Pulmonary:      Effort: Pulmonary effort is normal. No respiratory distress.      Breath sounds: Normal breath sounds. " No wheezing or rales.   Chest:      Chest wall: No tenderness.   Abdominal:      General: Bowel sounds are normal. There is no distension.      Palpations: Abdomen is soft.      Tenderness: There is no abdominal tenderness. There is no guarding or rebound.   Musculoskeletal:         General: No tenderness or deformity. Normal range of motion.      Cervical back: Neck supple.      Comments: Right upper extremity in a sling.   Skin:     General: Skin is warm and dry.      Findings: No rash.      Comments: Bruising of the right shoulder.   Neurological:      General: No focal deficit present.      Mental Status: She is alert and oriented to person, place, and time.      Cranial Nerves: No cranial nerve deficit.      Motor: Weakness present. No abnormal muscle tone.      Deep Tendon Reflexes: Reflexes normal.   Psychiatric:      Comments: Flat, but conversational.       Condition on Discharge: Stable for home with home health and family assistance.     Discharge Disposition:  Home or Self Care    Discharge Medications:     Discharge Medications      New Medications      Instructions Start Date   pantoprazole 40 MG EC tablet  Commonly known as: PROTONIX   40 mg, Oral, Daily         Continue These Medications      Instructions Start Date   aspirin 81 MG EC tablet   81 mg, Oral, Daily      atropine 1 % ophthalmic solution   1 drop, Left Eye, Daily      brimonidine 0.2 % ophthalmic solution  Commonly known as: ALPHAGAN   1 drop, Left Eye, 3 Times Daily      carvedilol 6.25 MG tablet  Commonly known as: COREG   6.25 mg, Oral, 2 Times Daily With Meals      cholecalciferol 25 MCG (1000 UT) tablet  Commonly known as: VITAMIN D3   2,000 Units, Oral, Daily      dorzolamide 2 % ophthalmic solution  Commonly known as: TRUSOPT   1 drop, Left Eye, 2 Times Daily      fluticasone 50 MCG/ACT nasal spray  Commonly known as: FLONASE   2 sprays, Nasal, 2 Times Daily      insulin glargine 100 UNIT/ML injection  Commonly known as: LANTUS,  SEMGLEE   10 Units, Subcutaneous, Nightly      ipratropium 0.02 % nebulizer solution  Commonly known as: ATROVENT   500 mcg, Nebulization, Every 4 Hours PRN      lactobacillus acidophilus capsule capsule   1 capsule, Oral, Daily      levothyroxine 25 MCG tablet  Commonly known as: SYNTHROID, LEVOTHROID   25 mcg, Oral, Daily      nicotine 21 MG/24HR patch  Commonly known as: NICODERM CQ   1 patch, Transdermal, Every 24 Hours      ondansetron 4 MG tablet  Commonly known as: ZOFRAN   4 mg, Oral, Every 6 Hours PRN      oxyCODONE-acetaminophen 7.5-325 MG per tablet  Commonly known as: PERCOCET   1 tablet, Oral, Every 6 Hours PRN      pravastatin 10 MG tablet  Commonly known as: PRAVACHOL   10 mg, Oral, Nightly      pregabalin 50 MG capsule  Commonly known as: LYRICA   50 mg, Oral, 2 Times Daily      Rhopressa 0.02 % solution  Generic drug: Netarsudil Dimesylate   1 drop, Left Eye, Nightly      sertraline 25 MG tablet  Commonly known as: ZOLOFT   25 mg, Oral, Daily      sevelamer 800 MG tablet  Commonly known as: RENAGEL   2,400 mg, Oral, 3 Times Daily With Meals      timolol 0.5 % ophthalmic solution  Commonly known as: TIMOPTIC   1 drop, Left Eye, Every Morning      Ventolin  (90 Base) MCG/ACT inhaler  Generic drug: albuterol sulfate HFA   2 puffs, Inhalation, Every 4 Hours PRN      Vyzulta 0.024 % solution  Generic drug: Latanoprostene Bunod   1 drop, Left Eye, Nightly, Left eye         Stop These Medications    famotidine 20 MG tablet  Commonly known as: PEPCID     losartan 50 MG tablet  Commonly known as: COZAAR     traMADol 50 MG tablet  Commonly known as: ULTRAM          Discharge Diet:   Diet Instructions     Diet: Soft Texture, Consistent Carbohydrate; Thin Liquids, No Restrictions; Ground      Discharge Diet:  Soft Texture  Consistent Carbohydrate       Fluid Consistency: Thin Liquids, No Restrictions    Soft Options: Ground        Activity at Discharge:   Activity Instructions     Activity as Tolerated           Follow-up Appointments:   1.  PCP in 1 week.  2.  Orthopedics is scheduled.  3.  Nephrology on dialysis with her regular schedule being Tuesday, Thursday, and Saturday.    Test Results Pending at Discharge: None.     Electronically signed by Mike Cartagena DO, 04/08/21, 11:06 CDT.    Time: 35 minutes.           Electronically signed by Mike Cartagena DO at 04/08/21 1119       Discharge Order (From admission, onward)     Start     Ordered    04/08/21 1101  Discharge patient  Once     Expected Discharge Date: 04/08/21    Discharge Disposition: Home or Self Care    Physician of Record for Attribution - Please select from Treatment Team: MIKE CARTAGENA [[id number]]    Review needed by CMO to determine Physician of Record: No       Question Answer Comment   Physician of Record for Attribution - Please select from Treatment Team MIKE CARTAGENA    Review needed by CMO to determine Physician of Record No        04/08/21 1100

## 2021-04-08 NOTE — THERAPY TREATMENT NOTE
Acute Care - Occupational Therapy Treatment Note  [facility]     Patient Name: Mini Gentile  : 1960  MRN: [id number]  Today's Date: 2021             Admit Date: 4/3/2021       ICD-10-CM ICD-9-CM   1. Altered mental status, unspecified altered mental status type  R41.82 780.97   2. Uremia  N19 586   3. Gastrointestinal hemorrhage, unspecified gastrointestinal hemorrhage type  K92.2 578.9   4. Elevated troponin  R77.8 790.6   5. Congestive heart failure, unspecified HF chronicity, unspecified heart failure type (CMS/[facility])  I50.9 428.0   6. Dysphagia, unspecified type  R13.10 787.20   7. Impaired mobility and ADLs  Z74.09 V49.89    Z78.9    8. Impaired mobility  Z74.09 799.89   9. Cognitive and behavioral changes  R41.89 799.59    R46.89 312.9   10. Fracture of humeral head, right, closed, with routine healing, subsequent encounter  S42.291D V54.11   11. 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)   • End stage renal failure on dialysis (CMS/[facility])   • Diabetes (CMS/[facility])   • Glaucoma   • HTN (hypertension)   • Proteinuria   • Type 2 diabetes mellitus, with long-term current use of insulin (CMS/[facility])   • Anemia due to chronic kidney disease, on chronic dialysis (CMS/[facility])   • Acute pulmonary edema (CMS/[facility])   • Non-compliance with renal dialysis (CMS/[facility])   • Elevated troponin due to ESRD    • Hyperkalemia   • Encephalopathy, metabolic, due to uremia   • High anion gap metabolic acidosis due to uremia   • Respiratory distress   • Acute diastolic heart failure (CMS/[facility])   • Lymph node enlargement, left axillary measuring 1.1 cm -follow-up for primary care   • Nausea vomiting and diarrhea   • Acute diastolic heart failure  (CMS/[facility])   • Diabetes mellitus with ophthalmic complication (CMS/[facility])   • Tobacco abuse   • Urinary tract infection with hematuria   • Pneumonia due to infectious organism   • Abnormal urine findings   • Closed fracture of right proximal humerus   • Hyponatremia   • Hypothyroidism (acquired)   • Forehead laceration secondary to fall   • Heme positive stool     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    • Chronic otitis media    • Diabetes (CMS/[facility])    • End stage renal disease on dialysis (CMS/[facility])    • Eustachian tube dysfunction    • Glaucoma    • HTN (hypertension)    • Mucoid otitis media    • Noncompliance of patient with renal dialysis (CMS/[facility])    • Tobacco abuse      Past Surgical History:   Procedure Laterality Date   • ARTERIOVENOUS FISTULA     • CATARACT EXTRACTION WITH INTRAOCULAR LENS IMPLANT     •  SECTION     • CHOLECYSTECTOMY     • MYRINGOTOMY W/ TUBES Bilateral    • MYRINGOTOMY W/ TUBES Right    • TUBAL ABDOMINAL LIGATION              OT ASSESSMENT FLOWSHEET (last 12 hours)      OT Evaluation and Treatment     Row Name 21 1300                   OT Time and Intention    Subjective Information  no complaints  -        Document Type  therapy note (daily note)  -        Mode of Treatment  occupational therapy  -        Patient Effort  good  -           General Information    Existing Precautions/Restrictions  fall;brace worn when out of bed;non-weight bearing Rue NWB, sling on when oob!  -           Pain Assessment    Additional Documentation  Pain Scale: Word Pre/Post-Treatment (Group)  -           Pain Scale: Numbers Pre/Post-Treatment    Pain Intervention(s)  Repositioned;Rest  -           Bed Mobility    Supine-Sit [place] (Bed Mobility)  set up;verbal cues;minimum assist (75% patient effort)  -           Functional Mobility    Functional Mobility- Ind. Level  set up required;verbal cues required;contact guard  assist  -        Functional Mobility- Device  rolling walker  -        Functional Mobility-Distance (Feet)  6  -           Transfer Assessment/Treatment    Transfers  bed-chair transfer  -           Transfers    Bed-Chair [place] (Transfers)  set up;verbal cues;contact guard  -        Assistive Device (Bed-Chair Transfers)  -- HHA!  -           Motor Skills    Motor Skills  therapeutic exercise  -        Therapeutic Exercise  elbow/forearm  -           Elbow/Forearm (Therapeutic Exercise)    Elbow/Forearm (Therapeutic Exercise)  AROM (active range of motion);strengthening exercise  -        Elbow/Forearm AROM (Therapeutic Exercise)  left;flexion;extension;sitting;10 repetitions;2 sets  -        Elbow/Forearm Strengthening (Therapeutic Exercise)  left;flexion;extension;sitting;2 lb free weight;resistance band;yellow  -           Hip (Therapeutic Exercise)    Hip (Therapeutic Exercise)  --  -           Activities of Daily Living    18259 - OT Self Care/Mgmt Minutes  11 transfer bed-chair!  -CJ           Wound 04/04/21 0142 coccyx    Wound - Properties Group Placement Date: 04/04/21  -VT Placement Time: 0142 -VT Location: coccyx  -VT    Retired Wound - Properties Group Date first assessed: 04/04/21  -VT Time first assessed: 0142  -VT Location: coccyx  -VT       Positioning and Restraints    Pre-Treatment Position  in bed  -CJ        Post Treatment Position  chair  -CJ        In Chair  sitting;reclined;call light within reach;encouraged to call for assist;legs elevated;RUE elevated  -           Progress Summary (OT)    Progress Toward Functional Goals (OT)  progress toward functional goals is good  -        Barriers to Overall Progress (OT)  Fall/NWB RUE/sling on when oob!  -        Impairments Still Limiting Function (OT)  Plan d/c!  -          User Key  (r) = Recorded By, (t) = Taken By, (c) = Cosigned By    Initials Name Effective Dates    CJ Jacky Zhang COTA/GABY 08/02/16 -      Alyx Kumar RN 08/02/16 -          Occupational Therapy Education                 Title: PT OT SLP Therapies (Not Started)     Topic: Occupational Therapy (Done)     Point: ADL training (Done)     Description:   Instruct learner(s) on proper safety adaptation and remediation techniques during self care or transfers.   Instruct in proper use of assistive devices.              Learning Progress Summary           Patient Acceptance, E,TB, VU,DU,NR by  at 4/8/2021 1300    Comment: Pt. performed ue exs with Lue, transfered bed-chair with cga from this hood/l!    Acceptance, E,TB, VU,DU,NR by  at 4/7/2021 0801    Comment: Pt. sitting in chair, with this hood/ls' assist able to feed self!    Acceptance, E,TB, VU,DU,NR by  at 4/6/2021 0757    Comment: Pt. required assist with self feeding!    Acceptance, E, NR by TSERING at 4/5/2021 1422                   Point: Home exercise program (Done)     Description:   Instruct learner(s) on appropriate technique for monitoring, assisting and/or progressing therapeutic exercises/activities.              Learning Progress Summary           Patient Acceptance, E,TB, VU,DU,NR by  at 4/8/2021 1300    Comment: Pt. performed ue exs with Lue, transfered bed-chair with cga from this hood/l!                   Point: Precautions (Done)     Description:   Instruct learner(s) on prescribed precautions during self-care and functional transfers.              Learning Progress Summary           Patient Acceptance, E,TB, VU,DU,NR by  at 4/8/2021 1300    Comment: Pt. performed ue exs with Lue, transfered bed-chair with cga from this hood/l!    Acceptance, E,TB, VU,DU,NR by  at 4/7/2021 0801    Comment: Pt. sitting in chair, with this hood/ls' assist able to feed self!    Acceptance, E,TB, VU,DU,NR by  at 4/6/2021 0757    Comment: Pt. required assist with self feeding!    Acceptance, E, NR by TSERING at 4/5/2021 1422                   Point: Body mechanics (Done)     Description:    Instruct learner(s) on proper positioning and spine alignment during self-care, functional mobility activities and/or exercises.              Learning Progress Summary           Patient Acceptance, E,TB, VU,DU,NR by  at 4/8/2021 1300    Comment: Pt. performed ue exs with Lue, transfered bed-chair with cga from this hood/l!    Acceptance, E,TB, VU,DU,NR by  at 4/7/2021 0801    Comment: Pt. sitting in chair, with this hood/ls' assist able to feed self!    Acceptance, E,TB, VU,DU,NR by  at 4/6/2021 0757    Comment: Pt. required assist with self feeding!    Acceptance, E, NR by  at 4/5/2021 1422                               User Key     Initials Effective Dates Name Provider Type Discipline     08/02/16 -  Jacky Zhang HOOD/L Occupational Therapy Assistant OT     12/04/20 -  Kami Chapman OTR/L Occupational Therapist OT                  OT Recommendation and Plan     Progress Toward Functional Goals (OT): progress toward functional goals is good  Plan of Care Review  Plan of Care Reviewed With: patient  Progress: improving  Outcome Summary: Pt. performed ue exs with Lue, performed transfers bed-chair with cga from this hood/l! Sling on Rue and elevated on pillow!  Plan of Care Reviewed With: patient  Outcome Summary: Pt. performed ue exs with Lue, performed transfers bed-chair with cga from this hood/l! Sling on Rue and elevated on pillow!    Outcome Measures     Row Name 04/08/21 1300 04/07/21 0801 04/06/21 0757       How much help from another is currently needed...    Putting on and taking off regular lower body clothing?  2  -CJ  2  -CJ  2  -CJ    Bathing (including washing, rinsing, and drying)  2  -CJ  2  -CJ  2  -CJ    Toileting (which includes using toilet bed pan or urinal)  2  -CJ  2  -CJ  2  -CJ    Putting on and taking off regular upper body clothing  2  -CJ  2  -CJ  2  -CJ    Taking care of personal grooming (such as brushing teeth)  2  -CJ  2  -CJ  2  -CJ    Eating meals  3  -CJ   3  -CJ  2  -CJ    AM-PAC 6 Clicks Score (OT)  13  -CJ  13  -CJ  12  -       Functional Assessment    Outcome Measure Options  AM-PAC 6 Clicks Daily Activity (OT)  -CJ  AM-PAC 6 Clicks Daily Activity (OT)  -CJ  AM-PAC 6 Clicks Daily Activity (OT)  -      User Key  (r) = Recorded By, (t) = Taken By, (c) = Cosigned By    Initials Name Provider Type     Jacky Zhang COTA/L Occupational Therapy Assistant          Time Calculation:   Time Calculation- OT     Row Name 04/08/21 1300             Time Calculation- OT    OT Start Time  1300  -      OT Stop Time  1327  -      OT Time Calculation (min)  27 min  -      Total Timed Code Minutes- OT  27 minute(s)  -      TCU Minutes- OT  27 min  -      OT Received On  04/08/21  -         Timed Charges    93248 - OT Therapeutic Exercise Minutes  27  -      37773 - OT Self Care/Mgmt Minutes  11 transfer bed-chair!  -        User Key  (r) = Recorded By, (t) = Taken By, (c) = Cosigned By    Initials Name Provider Type     Jacky Zhang COTA/L Occupational Therapy Assistant        Therapy Charges for Today     Code Description Service Date Service Provider Modifiers Qty    01820584318 HC OT SELF CARE/MGMT/TRAIN EA 15 MIN 4/7/2021 Jacky Zhang COTA/L GO 3    00461421938 HC OT THER PROC EA 15 MIN 4/8/2021 Jacky Zhang COTA/L GO 1    [id number] HC OT SELF CARE/MGMT/TRAIN EA 15 MIN 4/8/2021 Jacky Zhang COTA/L GO 1               TAMIKO Alvarez  4/8/2021

## 2021-04-08 NOTE — PROGRESS NOTES
PROGRESS NOTE.      Patient:  Mini Gentile  YOB: 1960  Date of Service: 4/8/2021  MRN: [id number]   Acct: [id number]   Primary Care Physician: Bharati Dahl APRN  Advance Directive:   Code Status and Medical Interventions:   Ordered at: 04/04/21 0213     Code Status:    CPR     Medical Interventions (Level of Support Prior to Arrest):    Full     Admit Date: 4/3/2021       Hospital Day: 4  Referring Provider: No ref. provider found      Patient Seen, Chart, Consults, Notes, Labs, Radiology studies reviewed.      Subjective:  Mini Gentile is a 61 y.o. female  whom we were consulted for end-stage renal disease.  She goes to [place] [facility] on Tuesday Thursday Saturday.  Apparently she was not sure yesterday.  She has been noncompliant lately.  Presented as she was brought in by family as she was found to be confused, disoriented.  She has been on Percocet for pain control for right humeral neck fracture.  Patient has type 2 diabetes, hypertension, COPD, tobacco use and anemia of chronic kidney disease. She was initially admitted to ICU. She was sleepy/confused/encephalopathy.  Patient received dialysis that was well-tolerated. She has since moved to the medical floor.  Today, she is  S/p dialysis and was doing well.    Allergies:  Bupropion, Chantix [varenicline], Sulfa antibiotics, Azithromycin, Levofloxacin, and Penicillins    Home Meds:  Medications Prior to Admission   Medication Sig Dispense Refill Last Dose   • traMADol (ULTRAM) 50 MG tablet Take 50 mg by mouth Every 8 (Eight) Hours As Needed for Moderate Pain .      • albuterol sulfate  (90 Base) MCG/ACT inhaler Inhale 2 puffs Every 4 (Four) Hours As Needed for Wheezing. 18 g 0 Unknown at Unknown time   • aspirin 81 MG EC tablet Take 81 mg by mouth Daily.   Unknown at Unknown time   • atropine 1 % ophthalmic solution Administer 1 drop into the left eye Daily.   Unknown at Unknown time   • brimonidine (ALPHAGAN) 0.2  % ophthalmic solution Administer 1 drop into the left eye 3 (Three) Times a Day.   Unknown at Unknown time   • carvedilol (COREG) 6.25 MG tablet Take 6.25 mg by mouth 2 (Two) Times a Day With Meals.   Unknown at Unknown time   • cholecalciferol (VITAMIN D3) 1000 units tablet Take 2,000 Units by mouth Daily.   Unknown at Unknown time   • dorzolamide (TRUSOPT) 2 % ophthalmic solution Administer 1 drop into the left eye 2 (Two) Times a Day.   Unknown at Unknown time   • famotidine (PEPCID) 20 MG tablet Take 1 tablet by mouth 2 (Two) Times a Day. 60 tablet 2 Unknown at Unknown time   • fluticasone (FLONASE) 50 MCG/ACT nasal spray 2 sprays into the nostril(s) as directed by provider 2 (Two) Times a Day.   Unknown at Unknown time   • insulin glargine (LANTUS) 100 UNIT/ML injection Inject 10 Units under the skin Every Night.   Unknown at Unknown time   • ipratropium (ATROVENT) 0.02 % nebulizer solution Take 500 mcg by nebulization Every 4 (Four) Hours As Needed for Wheezing or Shortness of Air.   Unknown at Unknown time   • lactobacillus acidophilus (RISAQUAD) capsule capsule Take 1 capsule by mouth Daily. 30 capsule 2 Unknown at Unknown time   • Latanoprostene Bunod (Vyzulta) 0.024 % solution Administer 1 drop into the left eye Every Night. Left eye    Unknown at Unknown time   • levothyroxine (SYNTHROID, LEVOTHROID) 25 MCG tablet Take 1 tablet by mouth Daily. 30 tablet 0 Unknown at Unknown time   • losartan (COZAAR) 50 MG tablet Take 50 mg by mouth 2 (Two) Times a Day.   Unknown at Unknown time   • Netarsudil Dimesylate (RHOPRESSA) 0.02 % solution Administer 1 drop into the left eye Every Night.   Unknown at Unknown time   • nicotine (NICODERM CQ) 21 MG/24HR patch Place 1 patch on the skin as directed by provider Daily. 30 patch 2 Unknown at Unknown time   • ondansetron (ZOFRAN) 4 MG tablet Take 1 tablet by mouth Every 6 (Six) Hours As Needed for Nausea or Vomiting. 24 tablet 2 Unknown at Unknown time   • pravastatin  (PRAVACHOL) 10 MG tablet Take 10 mg by mouth Every Night.   Unknown at Unknown time   • pregabalin (LYRICA) 50 MG capsule Take 50 mg by mouth 2 (Two) Times a Day.   Unknown at Unknown time   • sertraline (ZOLOFT) 25 MG tablet Take 25 mg by mouth Daily.   Unknown at Unknown time   • sevelamer (RENAGEL) 800 MG tablet Take 2,400 mg by mouth 3 (Three) Times a Day With Meals.   Unknown at Unknown time   • timolol (TIMOPTIC) 0.5 % ophthalmic solution Administer 1 drop into the left eye Every Morning.      • [DISCONTINUED] oxyCODONE-acetaminophen (PERCOCET) 7.5-325 MG per tablet Take 1 tablet by mouth Every 6 (Six) Hours As Needed for Severe Pain . 12 tablet 0 Unknown at Unknown time       Medicines:  Current Facility-Administered Medications   Medication Dose Route Frequency Provider Last Rate Last Admin   • acetaminophen (TYLENOL) tablet 650 mg  650 mg Oral Q4H PRN Fuentes Cartagena DO        Or   • acetaminophen (TYLENOL) suppository 650 mg  650 mg Rectal Q4H PRN Fuentes Cartagena DO       • atropine 1 % ophthalmic solution 1 drop  1 drop Left Eye Daily Fuentes Cartagena DO   1 drop at 04/08/21 1230   • brimonidine (ALPHAGAN) 0.2 % ophthalmic solution 1 drop  1 drop Left Eye TID Fuentes Cartagena DO   1 drop at 04/08/21 1231   • dextrose (D50W) 25 g/ 50mL Intravenous Solution 25 g  25 g Intravenous Q15 Min PRN Fuentes Cartagena DO   25 g at 04/04/21 1106   • dextrose (GLUTOSE) oral gel 15 g  15 g Oral Q15 Min PRN Fuentes Cartagena DO       • dorzolamide (TRUSOPT) 2 % ophthalmic solution 1 drop  1 drop Left Eye BID Fuentes Cartagena DO   1 drop at 04/08/21 1231   • fluticasone (FLONASE) 50 MCG/ACT nasal spray 2 spray  2 spray Nasal BID Fuentes Cartagena DO   2 spray at 04/08/21 1232   • glucagon (human recombinant) (GLUCAGEN DIAGNOSTIC) injection 1 mg  1 mg Subcutaneous Q15 Min PRN Fuentes Cartagena DO       • insulin detemir (LEVEMIR) injection 10 Units  10 Units Subcutaneous Nightly Fuentes Cartagena DO   10 Units at 04/07/21  2035   • insulin lispro (humaLOG) injection 0-9 Units  0-9 Units Subcutaneous TID AC Fuentes Cartagena DO   2 Units at 04/06/21 1729   • ipratropium (ATROVENT) nebulizer solution 0.5 mg  500 mcg Nebulization Q4H PRN Fuentes Cartagena, DO       • ipratropium-albuterol (DUO-NEB) nebulizer solution 3 mL  3 mL Nebulization Q6H PRN Fuentes Cartagena, DO       • latanoprost (XALATAN) 0.005 % ophthalmic solution 1 drop  1 drop Left Eye Nightly Fuentes Cartagena, DO   1 drop at 04/07/21 2040   • levothyroxine (SYNTHROID, LEVOTHROID) tablet 25 mcg  25 mcg Oral Q AM Fuentes Cartagena, DO   25 mcg at 04/08/21 0559   • nicotine (NICODERM CQ) 21 MG/24HR patch 1 patch  1 patch Transdermal Q AM Fuentes Cartagena DO   1 patch at 04/08/21 0559   • oxyCODONE-acetaminophen (PERCOCET) 7.5-325 MG per tablet 1 tablet  1 tablet Oral Q6H PRN Fuentes Cartagena DO   1 tablet at 04/08/21 1002   • pantoprazole (PROTONIX) EC tablet 40 mg  40 mg Oral Q AM Fuentes Cartagena DO   40 mg at 04/08/21 0634   • pravastatin (PRAVACHOL) tablet 10 mg  10 mg Oral Nightly Fuentes Cartagena DO   10 mg at 04/07/21 2037   • pregabalin (LYRICA) capsule 50 mg  50 mg Oral BID Fuentes Cartagena DO   50 mg at 04/08/21 1230   • sertraline (ZOLOFT) tablet 25 mg  25 mg Oral Daily Fuentes Cartagena DO   25 mg at 04/08/21 1229   • sevelamer (RENVELA) tablet 2,400 mg  2,400 mg Oral TID With Meals Fuentes Cartagena DO   2,400 mg at 04/08/21 1229   • sodium chloride 0.9 % flush 10 mL  10 mL Intravenous Q12H Fuentes Cartagena, DO   10 mL at 04/08/21 1232   • sodium chloride 0.9 % flush 10 mL  10 mL Intravenous PRN Fuentes Cartagena DO       • timolol (TIMOPTIC) 0.5 % ophthalmic solution 1 drop  1 drop Left Eye Q AM Fuentes Cartagena DO   1 drop at 04/08/21 0600       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    • Chronic otitis media    • Diabetes (CMS/[facility]ly [facility])    • End stage renal disease on dialysis (CMS/Formerly [facility])    • Eustachian  "tube dysfunction    • Glaucoma    • HTN (hypertension)    • Mucoid otitis media    • Noncompliance of patient with renal dialysis (CMS/HCC)    • Tobacco abuse        Past Surgical History:  Past Surgical History:   Procedure Laterality Date   • ARTERIOVENOUS FISTULA     • CATARACT EXTRACTION WITH INTRAOCULAR LENS IMPLANT     •  SECTION     • CHOLECYSTECTOMY     • MYRINGOTOMY W/ TUBES Bilateral    • MYRINGOTOMY W/ TUBES Right    • TUBAL ABDOMINAL LIGATION         Family History  Family History   Problem Relation Age of Onset   • Heart disease Mother    • Diabetes Father        Social History  Social History     Socioeconomic History   • Marital status: Single     Spouse name: Not on file   • Number of children: Not on file   • Years of education: Not on file   • Highest education level: Not on file   Tobacco Use   • Smoking status: Current Every Day Smoker     Packs/day: 2.00     Years: 6.00     Pack years: 12.00     Types: Cigarettes   • Smokeless tobacco: Never Used   Substance and Sexual Activity   • Alcohol use: No   • Drug use: No   • Sexual activity: Defer       Review of Systems:  History obtained from chart review and the patient  General ROS: No fever or chills  Respiratory ROS: No cough, +shortness of breath,- wheezing  Cardiovascular ROS: no chest pain but dyspnea on exertion  Gastrointestinal ROS: No abdominal pain or melena  Genito-Urinary ROS: No dysuria or hematuria  Musculoskeletal: negative  Skin: negative    Objective:  /63 (BP Location: Left arm, Patient Position: Lying)   Pulse 84   Temp 97.7 °F (36.5 °C) (Oral)   Resp 18   Ht 165.1 cm (65\")   Wt 59.9 kg (132 lb 1 oz)   SpO2 96%   BMI 21.98 kg/m²     Intake/Output Summary (Last 24 hours) at 2021 1506  Last data filed at 2021 1300  Gross per 24 hour   Intake 720 ml   Output 2200 ml   Net -1480 ml       Physical examination:  General: awake/alert   Chest:eased breath sound on both lung field.  CVS: regular rate and " rhythm  Abdominal: soft, nontender, normal bowel sounds  Extremities: no cyanosis or edema  Skin: warm and dry without rash  Neuro: No focal motor deficits    Labs:  Lab Results (last 24 hours)     Procedure Component Value Units Date/Time    POC Glucose Once [[id number]]  (Normal) Collected: 04/08/21 1137    Specimen: Blood Updated: 04/08/21 1208     Glucose 80 mg/dL      Comment: : [id number] Guanakito EuraisaMeter ID: [id number]       Basic Metabolic Panel [[id number]]  (Abnormal) Collected: 04/08/21 0415    Specimen: Blood Updated: 04/08/21 0535     Glucose 127 mg/dL      BUN 36 mg/dL      Creatinine 5.26 mg/dL      Sodium 129 mmol/L      Potassium 5.8 mmol/L      Comment: Slight hemolysis detected by analyzer. Results may be affected.        Chloride 90 mmol/L      CO2 22.0 mmol/L      Calcium 9.4 mg/dL      eGFR   Amer --     Comment: <15 Indicative of kidney failure.        eGFR Non African Amer 8 mL/min/1.73      Comment: <15 Indicative of kidney failure.        BUN/Creatinine Ratio 6.8     Anion Gap 17.0 mmol/L     Narrative:      GFR Normal >60  Chronic Kidney Disease <60  Kidney Failure <15      CBC (No Diff) [[id number]]  (Abnormal) Collected: 04/08/21 0415    Specimen: Blood Updated: 04/08/21 0510     WBC 5.96 10*3/mm3      RBC 2.98 10*6/mm3      Hemoglobin 9.2 g/dL      Hematocrit 29.0 %      MCV 97.3 fL      MCH 30.9 pg      MCHC 31.7 g/dL      RDW 15.8 %      RDW-SD 56.4 fl      MPV 10.3 fL      Platelets 158 10*3/mm3     POC Glucose Once [[id number]]  (Abnormal) Collected: 04/07/21 2034    Specimen: Blood Updated: 04/07/21 2045     Glucose 212 mg/dL      Comment: : [id number] Aiden QuintanaaMeter ID: [id number]             Radiology:   Imaging Results (Last 24 Hours)     ** No results found for the last 24 hours. **              Assessment   1.  End-stage renal disease on maintenance dialysis.  2.  Type II diabetic nephropathy.  3.  Noncompliant dialysis patient.  4.  Metabolic  encephalopathy.  5.  Anemia of chronic kidney disease.  6.  Recent right humeral fracture.    Plan:  Continue dialysis thrice weekly.  Follow-up labs.  Patient was encouraged to be more compliant.  If discharged, patient is to follow-up at her outpatient dialysis center.    Ten Boyd MD  4/8/2021  15:06 CDT

## 2021-04-08 NOTE — PROGRESS NOTES
Continued Stay Note  [facility]     Patient Name: Mini Gentile  MRN: [id number]  Today's Date: 4/8/2021    Admit Date: 4/3/2021    Discharge Plan     Row Name 04/08/21 1244       Plan    Plan  [facility]    Provided Post Acute Provider List?  Yes    Post Acute Provider List  Home Health    Delivered To  Patient    Method of Delivery  In person    Patient/Family in Agreement with Plan  yes    Final Discharge Disposition Code  06 - home with home health care    Final Note  Referral for home health.  Pt was provided a list of home health agencies and chose [facility].  SW has informed Hortencia at [facility].  Pt to discharge today.        Discharge Codes    No documentation.       Expected Discharge Date and Time     Expected Discharge Date Expected Discharge Time    Apr 8, 2021             STACI Centeno

## 2021-04-08 NOTE — PLAN OF CARE
Goal Outcome Evaluation:  Plan of Care Reviewed With: patient  Progress: improving  Outcome Summary: Pt agreeable to therapy w/ no complaints. Sit<>stand CGA. Amb 75' x2 w/ CGA. Pt did seem less aware to things on her R side today. Had to be guided away from two objects on her R side that she ran into. Pt stated she is going home to day and feels safe. Recommend assist while at home until pts WB restrictions are lifted.

## 2021-04-08 NOTE — THERAPY TREATMENT NOTE
Acute Care - Physical Therapy Treatment Note  [facility]     Patient Name: Mini Gentile  : 1960  MRN: [id number]  Today's Date: 2021           PT Assessment (last 12 hours)      PT Evaluation and Treatment     Row Name 21 1413          Physical Therapy Time and Intention    Subjective Information  no complaints  -TB     Document Type  therapy note (daily note)  -TB     Mode of Treatment  physical therapy  -TB     Patient Effort  good  -TB     Row Name 21 1413          General Information    Existing Precautions/Restrictions  fall;brace worn when out of bed;other (see comments) NWB RUE  -TB     Row Name 21 1413          Bed Mobility    Comment (Bed Mobility)  Up in chair  -TB     Row Name 21          Transfers    Transfers  sit-stand transfer;stand-sit transfer  -TB     Sit-Stand [place] (Transfers)  contact guard;verbal cues  -TB     Stand-Sit [place] (Transfers)  contact guard;verbal cues  -TB     Row Name 21          Gait/Stairs (Locomotion)    [place] Level (Gait)  contact guard;verbal cues  -TB     Assistive Device (Gait)  other (see comments) HHA  -TB     Distance in Feet (Gait)  75' x2  -TB     Deviations/Abnormal Patterns (Gait)  base of support, narrow  -TB     Comment (Gait/Stairs)  Pt did seem less aware of things on her R side today  -TB     Row Name             Wound 21 014 coccyx    Wound - Properties Group Placement Date: 21  -VT Placement Time: 142VT Location: coccyx  -VT    Retired Wound - Properties Group Date first assessed: 21  -VT Time first assessed: 142  -VT Location: coccyx  -VT    Row Name 21 1413          Plan of Care Review    Plan of Care Reviewed With  patient  -TB     Progress  improving  -TB     Outcome Summary  Pt agreeable to therapy w/ no complaints. Sit<>stand CGA. Amb 75' x2 w/ CGA. Pt did seem less aware to things on her R side today. Had to be guided away from two objects on her  R side that she ran into. Pt stated she is going home to day and feels safe. Recommend assist while at home until pts WB restrictions are lifted.  -TB     Row Name 04/08/21 1413          Positioning and Restraints    Pre-Treatment Position  sitting in chair/recliner  -TB     Post Treatment Position  chair  -TB     In Chair  reclined;sitting;call light within reach;encouraged to call for assist;legs elevated  -TB       User Key  (r) = Recorded By, (t) = Taken By, (c) = Cosigned By    Initials Name Provider Type    VT Alyx Watson, RN Registered Nurse    Chris Amaya PTA Physical Therapy Assistant        Physical Therapy Education                 Title: PT OT SLP Therapies (Not Started)     Topic: Physical Therapy (In Progress)     Point: Mobility training (Done)     Learning Progress Summary           Patient Acceptance, E, VU by WK at 4/6/2021 1155    Comment: POC                   Point: Home exercise program (Not Started)     Learner Progress:  Not documented in this visit.          Point: Body mechanics (Not Started)     Learner Progress:  Not documented in this visit.          Point: Precautions (Done)     Learning Progress Summary           Patient Acceptance, E, VU,NR by LW at 4/5/2021 1309    Comment: Pt educated on WB precautions in RUE; demoed how to assist in bed mobility appropriately.                               User Key     Initials Effective Dates Name Provider Type Discipline    WK 08/02/16 -  Carol Preston PTA Physical Therapy Assistant PT    LW 02/23/21 -  Simin Lr PT Student PT Student PT              PT Recommendation and Plan     Plan of Care Reviewed With: patient  Progress: improving  Outcome Summary: Pt agreeable to therapy w/ no complaints. Sit<>stand CGA. Amb 75' x2 w/ CGA. Pt did seem less aware to things on her R side today. Had to be guided away from two objects on her R side that she ran into. Pt stated she is going home to day and feels safe. Recommend  assist while at home until pts WB restrictions are lifted.  Outcome Measures     Row Name 04/08/21 1300 04/07/21 0801 04/06/21 0757       How much help from another is currently needed...    Putting on and taking off regular lower body clothing?  2  -CJ  2  -CJ  2  -CJ    Bathing (including washing, rinsing, and drying)  2  -CJ  2  -CJ  2  -CJ    Toileting (which includes using toilet bed pan or urinal)  2  -CJ  2  -CJ  2  -CJ    Putting on and taking off regular upper body clothing  2  -CJ  2  -CJ  2  -CJ    Taking care of personal grooming (such as brushing teeth)  2  -CJ  2  -CJ  2  -CJ    Eating meals  3  -CJ  3  -CJ  2  -CJ    AM-PAC 6 Clicks Score (OT)  13  -CJ  13  -CJ  12  -CJ       Functional Assessment    Outcome Measure Options  AM-PAC 6 Clicks Daily Activity (OT)  -CJ  AM-PAC 6 Clicks Daily Activity (OT)  -  AM-PAC 6 Clicks Daily Activity (OT)  -      User Key  (r) = Recorded By, (t) = Taken By, (c) = Cosigned By    Initials Name Provider Type     Jacky Zhang COTA/L Occupational Therapy Assistant           Time Calculation:   PT Charges     Row Name 04/08/21 1509             Time Calculation    Start Time  1413  -TB      Stop Time  1436  -TB      Time Calculation (min)  23 min  -TB      PT Received On  04/08/21  -TB         Time Calculation- PT    Total Timed Code Minutes- PT  23 minute(s)  -TB        User Key  (r) = Recorded By, (t) = Taken By, (c) = Cosigned By    Initials Name Provider Type    TB Chris Stallworth PTA Physical Therapy Assistant        Therapy Charges for Today     Code Description Service Date Service Provider Modifiers Qty    47901544073 HC GAIT TRAINING EA 15 MIN 4/7/2021 Chris Stallworth, PTA GP 1    34997628679 HC PT THER PROC EA 15 MIN 4/7/2021 Chris Stallworth, PTA GP 1    20901640427 HC PT THERAPEUTIC ACT EA 15 MIN 4/7/2021 Chris Stallworth, PTA GP 1    52829173731 HC GAIT TRAINING EA 15 MIN 4/8/2021 Chris Stallworth, PTA GP 1     [id number]  PT THER PROC EA 15 MIN 4/8/2021 Chris Stallworth, PTA GP 1          PT G-Codes  Outcome Measure Options: AM-PAC 6 Clicks Daily Activity (OT)  AM-PAC 6 Clicks Score (PT): 14  AM-PAC 6 Clicks Score (OT): 13    Chris Stallworth PTA  4/8/2021

## 2021-04-08 NOTE — PAYOR COMM NOTE
"4/8/21 [facility] [phone]  [phone]    CLINICAL/D/C SUMMARY          Mariana Mini MICHAEL (61 y.o. Female)     Date of Birth Social Security Number Address Home Phone MRN    1960  [id number] [place] [id number] [phone] [id number]    [employer] Marital Status          Other Single       Admission Date Admission Type Admitting Provider Attending Provider Department, Room/Bed    4/3/21 Emergency Fuentes Cartagena, Fuentes Ribeiro,  [facility] 4B, 433/1    Discharge Date Discharge Disposition Discharge Destination         Home or Self Care              Attending Provider: Fuentes Cartagena DO    Allergies: Bupropion, Chantix [Varenicline], Sulfa Antibiotics, Azithromycin, Levofloxacin, Penicillins    Isolation: None   Infection: None   Code Status: CPR    Ht: 165.1 cm (65\")   Wt: 59.9 kg (132 lb 1 oz)    Admission Cmt: None   Principal Problem: Encephalopathy, metabolic, due to uremia [G93.41]                 Active Insurance as of 4/3/2021     Primary Coverage     Payor Plan Insurance Group Employer/Plan Group    [facility] [employer]LL[employer]ARE MEDICARE REPLACEMENT      Payor Plan Address Payor Plan Phone Number Payor Plan Fax Number Effective Dates    [address] [address] [phone]  4/1/2021 - None Entered    [facility] [id number]       Subscriber Name Subscriber Birth Date Member ID       MARIANAMINI RODRIGUEZ 1960 [id number]           Secondary Coverage     Payor Plan Insurance Group Employer/Plan Group    WELLCARE [facility] WELLCARE MEDICAID      Payor Plan Address Payor Plan Phone Number Payor Plan Fax Number Effective Dates    [address] [address] [phone]  11/4/2016 - None Entered    [facility] [id number]       Subscriber Name Subscriber Birth Date Member ID       PAOLAAMANDAMINI RODRIGUEZ 1960 [id number]                 Emergency Contacts      (Rel.) Home Phone Work Phone Mobile Phone    Julio Cesar Gentile (Son) [phone] -- " [phone]    Erica Bowling (Daughter) [phone] -- [phone]    Jose Bowling (Other) [phone] -- [phone]               Discharge Summary      Fuentes Cartagena  at 04/08/21 1106                [facility] Medicine Services  DISCHARGE SUMMARY       Date of Admission: 4/3/2021  Date of Discharge:  4/8/2021  Primary Care Physician: Bharati Dahl APRN    Presenting Problem/History of Present Illness:  Altered mental status.     Final Discharge Diagnoses:  Active Hospital Problems    Diagnosis    • **Encephalopathy, metabolic, due to uremia    • End stage renal failure on dialysis (CMS/[facility])    • Non-compliance with renal dialysis (CMS/[facility])    • Heme positive stool    • Anemia due to chronic kidney disease, on chronic dialysis (CMS/[facility])    • Closed fracture of right proximal humerus    • Type 2 diabetes mellitus, with long-term current use of insulin (CMS/[facility])    • HTN (hypertension)      Consults: Dr. Caballero and Dr. Boyd with nephrology.     Procedures Performed: None.     Pertinent Test Results:   Imaging Results (All)     Procedure Component Value Units Date/Time    SCANNED - IMAGING [[id number]] Resulted: 04/03/21     Updated: 04/05/21 0237    CT Head Without Contrast [[id number]] Collected: 04/04/21 0746     Updated: 04/04/21 0754    Narrative:      Exam: CT HEAD WO CONTRAST-     Indication: Altered mental status     Comparison: 3/26/2021     Dose length product: 685 mGy cm. Automated exposure control was also  utilized to decrease patient radiation dose.     Findings:     No acute intracranial hemorrhage. No loss of gray-white differentiation.  Chronic microvascular ischemic white matter change and global cerebral  volume loss. No midline shift or mass effect. LEFT globe prosthetic  device. No acute orbital finding. Small amount of gas in the RIGHT  temporal soft tissues is likely venous related to IV placement. Mastoid  air cells and visualized paranasal sinuses are  clear. No acute osseous  finding.       Impression:         1.  No acute intracranial findings.  2.  Global cerebral volume loss and presumed chronic microvascular  ischemic white matter change.  3.  RIGHT temporal soft tissue gas, likely related to IV placement.     These findings are in agreement with the critical and emergent findings  from the [facility] preliminary report.  This report was finalized on 04/04/2021 07:51 by Dr. Vasquez Villanueva MD.    XR Chest 1 View [[id number]] Collected: 04/04/21 0643     Updated: 04/04/21 0648    Narrative:      Exam: XR CHEST 1 VW-     Indication: Weak/Dizzy/AMS triage protocol     Comparison: 3/26/2021     Findings:     Cardiac silhouette is stable. No change in small to moderate RIGHT  pleural effusion with overlying atelectasis/consolidation. The LEFT lung  and pleural space appear clear. No visible pneumothorax. LEFT subclavian  stent noted. Redemonstrated RIGHT humeral surgical neck fracture with  involvement of the greater and lesser tuberosities.       Impression:      Impression:     No change in small to moderate RIGHT pleural effusion with overlying  atelectasis/consolidation.  This report was finalized on 04/04/2021 06:45 by Dr. Vasquez Villanueva MD.        LAB RESULTS:      Lab 04/08/21  0415 04/06/21  0343 04/05/21  0703 04/05/21  0027 04/04/21  1148 04/04/21  0753 04/04/21  0247 04/03/21  2155   WBC 5.96 3.99  --  3.24*  --   --  3.73 3.77   HEMOGLOBIN 9.2* 9.1* 9.6* 9.9* 8.5* 10.0* 10.2* 9.7*   HEMATOCRIT 29.0* 28.4* 30.6* 31.5* 27.1* 31.1* 32.8* 30.3*   PLATELETS 158 151  --  155  --   --  162 177   NEUTROS ABS  --   --   --   --   --   --  2.25 2.21   IMMATURE GRANS (ABS)  --   --   --   --   --   --  0.02 0.01   LYMPHS ABS  --   --   --   --   --   --  0.76 0.77   MONOS ABS  --   --   --   --   --   --  0.49 0.53   EOS ABS  --   --   --   --   --   --  0.19 0.22   MCV 97.3* 95.6  --  97.2*  --   --  97.9* 95.9   PROCALCITONIN  --   --   --   --   --  0.23  --   --     PROTIME  --   --   --   --   --   --   --  14.4         Lab 04/08/21  0415 04/06/21  0343 04/05/21  0027 04/04/21 0247 04/03/21 2340 04/03/21  2155   SODIUM 129* 137 137 137 136  --    POTASSIUM 5.8* 4.1 4.3 4.7 4.9  --    CHLORIDE 90* 99 97* 98 96*  --    CO2 22.0 25.0 21.0* 24.0 24.0  --    ANION GAP 17.0* 13.0 19.0* 15.0 16.0*  --    BUN 36* 21 24* 47* 45*  --    CREATININE 5.26* 2.69* 3.72* 5.91* 5.52*  --    GLUCOSE 127* 215* 96 89 100*  --    CALCIUM 9.4 9.2 9.0 9.5 9.5  --    MAGNESIUM  --   --   --  2.5*  --  2.5*   PHOSPHORUS  --   --   --  5.3* 5.2*  --    HEMOGLOBIN A1C  --   --   --   --   --  5.80*   TSH  --   --   --   --  4.570*  --          Lab 04/05/21  0027 04/04/21 0247 04/03/21 2340   TOTAL PROTEIN 6.3 6.8 6.9   ALBUMIN 3.30* 3.60 3.90   GLOBULIN 3.0 3.2 3.0   ALT (SGPT) 13 15 9   AST (SGOT) 27 40* 26   BILIRUBIN 1.1 0.9 0.7   ALK PHOS 212* 229* 206*         Lab 04/04/21  1148 04/04/21  0753 04/03/21  2340 04/03/21  2155   PROBNP  --   --  58,602.0*  --    TROPONIN T 0.082* 0.091* 0.099*  --    PROTIME  --   --   --  14.4   INR  --   --   --  1.16*             Lab 04/04/21  0044 04/03/21  2340   IRON  --  41   IRON SATURATION  --  13*   TIBC  --  326   TRANSFERRIN  --  219   FERRITIN  --  641.90*   FOLATE  --  8.59   VITAMIN B 12  --  479   ABO TYPING A  --    RH TYPING Positive  --    ANTIBODY SCREEN Negative  --          Lab 04/03/21  2201   PH, ARTERIAL 7.430   PCO2, ARTERIAL 39.3   PO2 ART 72.7*   O2 SATURATION ART 96.4   HCO3 ART 26.1*   BASE EXCESS ART 1.7     Brief Urine Lab Results  (Last result in the past 365 days)      Color   Clarity   Blood   Leuk Est   Nitrite   Protein   CREAT   Urine HCG        04/03/21 2220 Dark Yellow Clear Small (1+) Trace Negative >=300 mg/dL (3+)             Microbiology Results (last 10 days)     Procedure Component Value - Date/Time    COVID-19,[facility] IN-HOUSE,Nasal Swab No Transport Media 3-4 HR TAT - Swab, Nasal Cavity [[id number]]  (Normal)  Collected: 04/03/21 2216    Lab Status: Final result Specimen: Swab from Nasal Cavity Updated: 04/03/21 2306     COVID19 Not Detected    Narrative:      Fact sheet for providers: https://www.fda.gov/media/267193/download     Fact sheet for patients: https://www.fda.gov/media/422697/download    Test performed by PCR.    Consider negative results in combination with clinical observations, patient history, and epidemiological information.        Hospital Course:  The patient is known to the service.  She was just in the hospital and discharged on 3/28.  At that time, she had fallen and was found to have a minimally displaced fracture of the proximal end of the right humerus.  She was admitted for orthopedic evaluation and dialysis.  She was ultimately dismissed in stable condition.  Apparently, since that time, she has not done as well as usual and has also missed dialysis recently.  She presented with what was felt to be uremic encephalopathy on 4/3 and was admitted by Dr. Kent.     Nephrology consulted.  She typically dialyzes on a Tuesday, Thursday, and Saturday schedule. She missed dialysis on Saturday, 4/3.  She was dialyzed on 4/4 and 4/5.  Dialysis again today as it is her regular schedule.    She did have some new hyponatremia and hyperkalemia on labs this morning, but these should be corrected with her dialysis treatment today.     She has problems with anemia of chronic disease secondary to her renal dysfunction.  She had a fecal occult blood test done in the emergency department that was positive.  She was started on a Protonix drip and GI was consulted. No plans for endoscopy at this point according to GI.  She did have a dark bowel movement on the morning of 4/6 according to the nursing staff. Discontinued the Protonix drip and placed her on oral Protonix on 4/5.  She usually takes Pepcid at home, but we will have her on Protonix moving forward.     She recently suffered a humerus fracture.  She was  "evaluated by orthopedics on her previous hospitalization.  She was placed in a sling for conservative management.  She has upcoming follow-up in the office for repeat x-rays.     Her most recent hemoglobin A1c was 5.8.  She takes Lantus in the home setting.  Continue Accu-Cheks and sliding scale insulin.  Lowest glucose was 85.     Reconciled and resumed other home medications as appropriate.     PT/OT followed again.     Transferred to the floor on 4/5.    There was some discussion about her going to skilled nursing at discharge, but she and her family no longer wish to do this.  They want for her to return home with family assistance and home health.  She is stable for discharge today.  She is out of pain medication for her right arm so I have sent her prescription for this.  She needs to follow-up with her primary care provider in 1 week and see orthopedics as scheduled.  She needs to be compliant with her dialysis.    Physical Exam on Discharge:  /55 (BP Location: Left arm, Patient Position: Lying)   Pulse 80   Temp 98 °F (36.7 °C) (Oral)   Resp 18   Ht 165.1 cm (65\")   Wt 59.9 kg (132 lb 1 oz)   SpO2 97%   BMI 21.98 kg/m²   Physical Exam  Constitutional:       Appearance: She is well-developed.      Comments: Up in bed.  Seen on dialysis.  No distress.  No family present.  Discussed with her nurse, Vivian.   HENT:      Head: Normocephalic and atraumatic.   Eyes:      Conjunctiva/sclera: Conjunctivae normal.      Pupils: Pupils are equal, round, and reactive to light.   Neck:      Vascular: No JVD.   Cardiovascular:      Rate and Rhythm: Normal rate and regular rhythm.      Heart sounds: Normal heart sounds. No murmur heard.   No friction rub. No gallop.       Comments: Left upper extremity AVF accessed for HD.  Pulmonary:      Effort: Pulmonary effort is normal. No respiratory distress.      Breath sounds: Normal breath sounds. No wheezing or rales.   Chest:      Chest wall: No tenderness. "   Abdominal:      General: Bowel sounds are normal. There is no distension.      Palpations: Abdomen is soft.      Tenderness: There is no abdominal tenderness. There is no guarding or rebound.   Musculoskeletal:         General: No tenderness or deformity. Normal range of motion.      Cervical back: Neck supple.      Comments: Right upper extremity in a sling.   Skin:     General: Skin is warm and dry.      Findings: No rash.      Comments: Bruising of the right shoulder.   Neurological:      General: No focal deficit present.      Mental Status: She is alert and oriented to person, place, and time.      Cranial Nerves: No cranial nerve deficit.      Motor: Weakness present. No abnormal muscle tone.      Deep Tendon Reflexes: Reflexes normal.   Psychiatric:      Comments: Flat, but conversational.       Condition on Discharge: Stable for home with home health and family assistance.     Discharge Disposition:  Home or Self Care    Discharge Medications:     Discharge Medications      New Medications      Instructions Start Date   pantoprazole 40 MG EC tablet  Commonly known as: PROTONIX   40 mg, Oral, Daily         Continue These Medications      Instructions Start Date   aspirin 81 MG EC tablet   81 mg, Oral, Daily      atropine 1 % ophthalmic solution   1 drop, Left Eye, Daily      brimonidine 0.2 % ophthalmic solution  Commonly known as: ALPHAGAN   1 drop, Left Eye, 3 Times Daily      carvedilol 6.25 MG tablet  Commonly known as: COREG   6.25 mg, Oral, 2 Times Daily With Meals      cholecalciferol 25 MCG (1000 UT) tablet  Commonly known as: VITAMIN D3   2,000 Units, Oral, Daily      dorzolamide 2 % ophthalmic solution  Commonly known as: TRUSOPT   1 drop, Left Eye, 2 Times Daily      fluticasone 50 MCG/ACT nasal spray  Commonly known as: FLONASE   2 sprays, Nasal, 2 Times Daily      insulin glargine 100 UNIT/ML injection  Commonly known as: LANTUS, SEMGLEE   10 Units, Subcutaneous, Nightly      ipratropium 0.02 %  nebulizer solution  Commonly known as: ATROVENT   500 mcg, Nebulization, Every 4 Hours PRN      lactobacillus acidophilus capsule capsule   1 capsule, Oral, Daily      levothyroxine 25 MCG tablet  Commonly known as: SYNTHROID, LEVOTHROID   25 mcg, Oral, Daily      nicotine 21 MG/24HR patch  Commonly known as: NICODERM CQ   1 patch, Transdermal, Every 24 Hours      ondansetron 4 MG tablet  Commonly known as: ZOFRAN   4 mg, Oral, Every 6 Hours PRN      oxyCODONE-acetaminophen 7.5-325 MG per tablet  Commonly known as: PERCOCET   1 tablet, Oral, Every 6 Hours PRN      pravastatin 10 MG tablet  Commonly known as: PRAVACHOL   10 mg, Oral, Nightly      pregabalin 50 MG capsule  Commonly known as: LYRICA   50 mg, Oral, 2 Times Daily      Rhopressa 0.02 % solution  Generic drug: Netarsudil Dimesylate   1 drop, Left Eye, Nightly      sertraline 25 MG tablet  Commonly known as: ZOLOFT   25 mg, Oral, Daily      sevelamer 800 MG tablet  Commonly known as: RENAGEL   2,400 mg, Oral, 3 Times Daily With Meals      timolol 0.5 % ophthalmic solution  Commonly known as: TIMOPTIC   1 drop, Left Eye, Every Morning      Ventolin  (90 Base) MCG/ACT inhaler  Generic drug: albuterol sulfate HFA   2 puffs, Inhalation, Every 4 Hours PRN      Vyzulta 0.024 % solution  Generic drug: Latanoprostene Bunod   1 drop, Left Eye, Nightly, Left eye         Stop These Medications    famotidine 20 MG tablet  Commonly known as: PEPCID     losartan 50 MG tablet  Commonly known as: COZAAR     traMADol 50 MG tablet  Commonly known as: ULTRAM          Discharge Diet:   Diet Instructions     Diet: Soft Texture, Consistent Carbohydrate; Thin Liquids, No Restrictions; Ground      Discharge Diet:  Soft Texture  Consistent Carbohydrate       Fluid Consistency: Thin Liquids, No Restrictions    Soft Options: Ground        Activity at Discharge:   Activity Instructions     Activity as Tolerated          Follow-up Appointments:   1.  PCP in 1 week.  2.   Orthopedics is scheduled.  3.  Nephrology on dialysis with her regular schedule being Tuesday, Thursday, and Saturday.    Test Results Pending at Discharge: None.     Electronically signed by Fuentes Cartagena DO, 04/08/21, 11:06 CDT.    Time: 35 minutes.           Electronically signed by Fuentes Cartagena DO at 04/08/21 3766

## 2021-04-08 NOTE — PLAN OF CARE
Problem: Adult Inpatient Plan of Care  Goal: Plan of Care Review  Outcome: Ongoing, Progressing  Flowsheets (Taken 4/8/2021 1300)  Progress: improving  Plan of Care Reviewed With: patient  Outcome Summary: Pt. performed ue exs with Lue, performed transfers bed-chair with cga from this hood/l! Sling on Rue and elevated on pillow!   Goal Outcome Evaluation:  Plan of Care Reviewed With: patient  Progress: improving  Outcome Summary: Pt. performed ue exs with Lue, performed transfers bed-chair with cga from this hood/l! Sling on Rue and elevated on pillow!

## 2021-04-09 NOTE — THERAPY DISCHARGE NOTE
Acute Care - Physical Therapy Discharge Summary  [facility]       Patient Name: Mini Gentile  : 1960  MRN: [id number]    Today's Date: 2021                 Admit Date: 4/3/2021      PT Recommendation and Plan    Visit Dx:    ICD-10-CM ICD-9-CM   1. Altered mental status, unspecified altered mental status type  R41.82 780.97   2. Uremia  N19 586   3. Gastrointestinal hemorrhage, unspecified gastrointestinal hemorrhage type  K92.2 578.9   4. Elevated troponin  R77.8 790.6   5. Congestive heart failure, unspecified HF chronicity, unspecified heart failure type (CMS/HCC)  I50.9 428.0   6. Dysphagia, unspecified type  R13.10 787.20   7. Impaired mobility and ADLs  Z74.09 V49.89    Z78.9    8. Impaired mobility  Z74.09 799.89   9. Cognitive and behavioral changes  R41.89 799.59    R46.89 312.9   10. Fracture of humeral head, right, closed, with routine healing, subsequent encounter  S42.291D V54.11   11. Closed fracture of proximal end of right humerus, unspecified fracture morphology, initial encounter  S42.201A 812.00       Outcome Measures     Row Name 21 1300 21 0801 21 0757       How much help from another is currently needed...    Putting on and taking off regular lower body clothing?  2  -CJ  2  -CJ  2  -CJ    Bathing (including washing, rinsing, and drying)  2  -CJ  2  -CJ  2  -CJ    Toileting (which includes using toilet bed pan or urinal)  2  -CJ  2  -CJ  2  -CJ    Putting on and taking off regular upper body clothing  2  -CJ  2  -CJ  2  -CJ    Taking care of personal grooming (such as brushing teeth)  2  -CJ  2  -CJ  2  -CJ    Eating meals  3  -CJ  3  -CJ  2  -CJ    AM-PAC 6 Clicks Score (OT)  13  -CJ  13  -CJ  12  -CJ       Functional Assessment    Outcome Measure Options  AM-PAC 6 Clicks Daily Activity (OT)  -CJ  AM-PAC 6 Clicks Daily Activity (OT)  -CJ  AM-PAC 6 Clicks Daily Activity (OT)  -CJ      User Key  (r) = Recorded By, (t) = Taken By, (c) = Cosigned By    Initials  Name Provider Type    Jacky Hawkins COTA/L Occupational Therapy Assistant              Rehab Goal Summary     Row Name 04/09/21 0718             Bed Mobility Goal 1 (PT)    Activity/Assistive Device (Bed Mobility Goal 1, PT)  bridging;rolling to left;rolling to right;scooting;sit to supine/supine to sit;sidelying to sit/sit to sidelying  -AH      [place] Level/Cues Needed (Bed Mobility Goal 1, PT)  minimum assist (75% or more patient effort)  -AH      Time Frame (Bed Mobility Goal 1, PT)  long term goal (LTG);10 days  -AH      Progress/Outcomes (Bed Mobility Goal 1, PT)  goal met  -AH         Transfer Goal 1 (PT)    Activity/Assistive Device (Transfer Goal 1, PT)  sit-to-stand/stand-to-sit;bed-to-chair/chair-to-bed  -AH      [place] Level/Cues Needed (Transfer Goal 1, PT)  minimum assist (75% or more patient effort)  -AH      Time Frame (Transfer Goal 1, PT)  long term goal (LTG);10 days  -AH      Progress/Outcome (Transfer Goal 1, PT)  goal met  -         Gait Training Goal 1 (PT)    Activity/Assistive Device (Gait Training Goal 1, PT)  gait (walking locomotion);decrease fall risk;improve balance and speed;increase endurance/gait distance;cane, straight;walker, lenore assist. device use pending most appropriate  -AH      [place] Level (Gait Training Goal 1, PT)  supervision required  -AH      Distance (Gait Training Goal 1, PT)  30ft  -AH      Time Frame (Gait Training Goal 1, PT)  long term goal (LTG);10 days  -AH      Progress/Outcome (Gait Training Goal 1, PT)  goal not met  -         Patient Education Goal (PT)    Activity (Patient Education Goal, PT)  Pt able to don/doff sling independently.  -      [place]/Cues/Accuracy (Memory Goal 2, PT)  demonstrates adequately  -AH      Time Frame (Patient Education Goal, PT)  long term goal (LTG);10 days  -AH      Progress/Outcome (Patient Education Goal, PT)  goal not met  -        User Key  (r) = Recorded By, (t) = Taken By, (c) =  Cosigned By    Initials Name Provider Type Discipline    Meg Carrillo, PTA Physical Therapy Assistant PT              PT Discharge Summary  Reason for Discharge: Discharge from facility  Outcomes Achieved: Refer to plan of care for updates on goals achieved  Discharge Destination: Home      Meg Cantu PTA   4/9/2021

## 2021-04-09 NOTE — THERAPY DISCHARGE NOTE
Acute Care - Occupational Therapy Discharge Summary  [facility]     Patient Name: Mini Gentile  : 1960  MRN: [id number]    Today's Date: 2021                 Admit Date: 4/3/2021        OT Recommendation and Plan    Visit Dx:    ICD-10-CM ICD-9-CM   1. Altered mental status, unspecified altered mental status type  R41.82 780.97   2. Uremia  N19 586   3. Gastrointestinal hemorrhage, unspecified gastrointestinal hemorrhage type  K92.2 578.9   4. Elevated troponin  R77.8 790.6   5. Congestive heart failure, unspecified HF chronicity, unspecified heart failure type (CMS/[facility])  I50.9 428.0   6. Dysphagia, unspecified type  R13.10 787.20   7. Impaired mobility and ADLs  Z74.09 V49.89    Z78.9    8. Impaired mobility  Z74.09 799.89   9. Cognitive and behavioral changes  R41.89 799.59    R46.89 312.9   10. Fracture of humeral head, right, closed, with routine healing, subsequent encounter  S42.291D V54.11   11. Closed fracture of proximal end of right humerus, unspecified fracture morphology, initial encounter  S42.201A 812.00               OT Rehab Goals     Row Name 21 0718             Bathing Goal 1 (OT)    Activity/Device (Bathing Goal 1, OT)  bathing skills, all  -TS      [place] Level/Cues Needed (Bathing Goal 1, OT)  minimum assist (75% or more patient effort)  -TS      Time Frame (Bathing Goal 1, OT)  long term goal (LTG);by discharge  -TS      Progress/Outcomes (Bathing Goal 1, OT)  goal not met  -TS         Dressing Goal 1 (OT)    Activity/Device (Dressing Goal 1, OT)  upper body dressing  -TS      [place]/Cues Needed (Dressing Goal 1, OT)  contact guard assist;verbal cues required  -TS      Time Frame (Dressing Goal 1, OT)  long term goal (LTG);by discharge  -TS      Progress/Outcome (Dressing Goal 1, OT)  goal not met  -TS         Toileting Goal 1 (OT)    Activity/Device (Toileting Goal 1, OT)  toileting skills, all  -TS      [place] Level/Cues Needed (Toileting Goal 1, OT)   minimum assist (75% or more patient effort)  -TS      Time Frame (Toileting Goal 1, OT)  long term goal (LTG);by discharge  -TS      Progress/Outcome (Toileting Goal 1, OT)  goal not met  -TS        User Key  (r) = Recorded By, (t) = Taken By, (c) = Cosigned By    Initials Name Provider Type Discipline    TS Bijal Lindquist GENTILE/L Occupational Therapy Assistant OT          Outcome Measures     Row Name 04/08/21 1300 04/07/21 0801 04/06/21 0757       How much help from another is currently needed...    Putting on and taking off regular lower body clothing?  2  -CJ  2  -CJ  2  -CJ    Bathing (including washing, rinsing, and drying)  2  -CJ  2  -CJ  2  -CJ    Toileting (which includes using toilet bed pan or urinal)  2  -CJ  2  -CJ  2  -CJ    Putting on and taking off regular upper body clothing  2  -CJ  2  -CJ  2  -CJ    Taking care of personal grooming (such as brushing teeth)  2  -CJ  2  -CJ  2  -CJ    Eating meals  3  -CJ  3  -CJ  2  -CJ    AM-PAC 6 Clicks Score (OT)  13  -CJ  13  -CJ  12  -CJ       Functional Assessment    Outcome Measure Options  AM-PAC 6 Clicks Daily Activity (OT)  -CJ  AM-PAC 6 Clicks Daily Activity (OT)  -CJ  AM-PAC 6 Clicks Daily Activity (OT)  -CJ      User Key  (r) = Recorded By, (t) = Taken By, (c) = Cosigned By    Initials Name Provider Type    CJ Jacky Zhang COTA/L Occupational Therapy Assistant          Timed Therapy Charges  Total Units: 3    Charges  Total Units: 3    Procedure Name Documented Minutes Units Code    HC OT THER PROC EA 15 MIN 27  2    60336 (CPT®)      HC OT SELF CARE/MGMT/TRAIN EA 15 MIN 11  1    11409 (CPT®)               Documented Minutes  Total Minutes: 38    Therapy Provided Minutes    73357 - OT Therapeutic Exercise Minutes 27    59173 - OT Self Care/Mgmt Minutes 11                    OT Discharge Summary  Anticipated Discharge Disposition (OT): home with assist  Reason for Discharge: Discharge from facility  Outcomes Achieved: Refer to plan of care  for updates on goals achieved  Discharge Destination: Home with assist      TAMIKO Bang  4/9/2021

## 2021-04-09 NOTE — OUTREACH NOTE
Prep Survey      Responses   [facility] facility patient discharged from?  [place]   Is LACE score < 7 ?  No   Emergency Room discharge w/ pulse ox?  No   Eligibility  Readm Mgmt   Discharge diagnosis  Encephalopathy, metabolic, due to uremia, ESRD on dialysis   Does the patient have one of the following disease processes/diagnoses(primary or secondary)?  Other   Does the patient have Home health ordered?  Yes   What is the Home health agency?   [facility]   Is there a DME ordered?  No   Comments regarding appointments  Per AVS   Medication alerts for this patient  Per AVS   Prep survey completed?  Yes          Chasity Freedman RN

## 2021-04-12 ENCOUNTER — VIRTUAL VISIT (OUTPATIENT)
Dept: PRIMARY CARE CLINIC | Age: 61
End: 2021-04-12
Payer: MEDICARE

## 2021-04-12 DIAGNOSIS — Z79.4 TYPE 2 DIABETES MELLITUS WITH OTHER OPHTHALMIC COMPLICATION, WITH LONG-TERM CURRENT USE OF INSULIN (HCC): ICD-10-CM

## 2021-04-12 DIAGNOSIS — S42.291D OTHER CLOSED DISPLACED FRACTURE OF PROXIMAL END OF RIGHT HUMERUS WITH ROUTINE HEALING, SUBSEQUENT ENCOUNTER: Primary | ICD-10-CM

## 2021-04-12 DIAGNOSIS — E11.39 TYPE 2 DIABETES MELLITUS WITH OTHER OPHTHALMIC COMPLICATION, WITH LONG-TERM CURRENT USE OF INSULIN (HCC): ICD-10-CM

## 2021-04-12 DIAGNOSIS — Z99.2 CKD (CHRONIC KIDNEY DISEASE) STAGE V REQUIRING CHRONIC DIALYSIS (HCC): ICD-10-CM

## 2021-04-12 DIAGNOSIS — N18.6 CKD (CHRONIC KIDNEY DISEASE) STAGE V REQUIRING CHRONIC DIALYSIS (HCC): ICD-10-CM

## 2021-04-12 DIAGNOSIS — I10 ESSENTIAL (PRIMARY) HYPERTENSION: ICD-10-CM

## 2021-04-12 DIAGNOSIS — E03.9 HYPOTHYROIDISM, UNSPECIFIED TYPE: ICD-10-CM

## 2021-04-12 PROBLEM — N25.81 SECONDARY HYPERPARATHYROIDISM OF RENAL ORIGIN (HCC): Status: ACTIVE | Noted: 2018-09-20

## 2021-04-12 PROBLEM — E87.70 FLUID OVERLOAD, UNSPECIFIED: Status: ACTIVE | Noted: 2020-06-25

## 2021-04-12 PROBLEM — H40.1190 PRIMARY OPEN ANGLE GLAUCOMA (POAG): Status: ACTIVE | Noted: 2018-09-24

## 2021-04-12 PROBLEM — R11.2 NAUSEA VOMITING AND DIARRHEA: Status: ACTIVE | Noted: 2020-03-18

## 2021-04-12 PROBLEM — D53.0 ANEMIA DUE TO PROTEIN DEFICIENCY: Status: ACTIVE | Noted: 2018-06-22

## 2021-04-12 PROBLEM — R51.9 HEADACHE, UNSPECIFIED: Status: ACTIVE | Noted: 2020-11-20

## 2021-04-12 PROBLEM — H17.9 CORNEAL SCAR: Status: ACTIVE | Noted: 2018-04-30

## 2021-04-12 PROBLEM — Z86.39 PERSONAL HISTORY OF OTHER ENDOCRINE, NUTRITIONAL AND METABOLIC DISEASE: Status: ACTIVE | Noted: 2017-04-21

## 2021-04-12 PROBLEM — R79.89 ELEVATED TROPONIN: Status: ACTIVE | Noted: 2019-12-06

## 2021-04-12 PROBLEM — S01.81XA FOREHEAD LACERATION: Status: ACTIVE | Noted: 2021-03-27

## 2021-04-12 PROBLEM — I15.9 SECONDARY HYPERTENSION, UNSPECIFIED: Status: ACTIVE | Noted: 2019-09-14

## 2021-04-12 PROBLEM — Z23 ENCOUNTER FOR IMMUNIZATION: Status: ACTIVE | Noted: 2017-05-16

## 2021-04-12 PROBLEM — S42.201A CLOSED FRACTURE OF RIGHT PROXIMAL HUMERUS: Status: ACTIVE | Noted: 2021-03-27

## 2021-04-12 PROBLEM — E11.3599 PROLIFERATIVE DIABETIC RETINOPATHY (HCC): Status: ACTIVE | Noted: 2018-04-30

## 2021-04-12 PROBLEM — R59.9 LYMPH NODE ENLARGEMENT: Status: ACTIVE | Noted: 2020-01-21

## 2021-04-12 PROBLEM — T15.10XA FOREIGN BODY IN CONJUNCTIVAL SAC: Status: ACTIVE | Noted: 2018-04-30

## 2021-04-12 PROBLEM — Z11.1 ENCOUNTER FOR SCREENING FOR RESPIRATORY TUBERCULOSIS: Status: ACTIVE | Noted: 2017-05-17

## 2021-04-12 PROBLEM — E46 PROTEIN-CALORIE MALNUTRITION (HCC): Status: ACTIVE | Noted: 2020-11-20

## 2021-04-12 PROBLEM — Z72.0 TOBACCO ABUSE: Status: ACTIVE | Noted: 2020-04-28

## 2021-04-12 PROBLEM — T78.40XA ALLERGY, UNSPECIFIED, INITIAL ENCOUNTER: Status: ACTIVE | Noted: 2021-02-09

## 2021-04-12 PROBLEM — R19.5 HEME POSITIVE STOOL: Status: ACTIVE | Noted: 2021-04-04

## 2021-04-12 PROBLEM — R07.89 OTHER CHEST PAIN: Status: ACTIVE | Noted: 2020-10-03

## 2021-04-12 PROBLEM — H50.10 SENSORY EXOTROPIA: Status: ACTIVE | Noted: 2018-04-30

## 2021-04-12 PROBLEM — T78.2XXA ANAPHYLACTIC SHOCK, UNSPECIFIED, INITIAL ENCOUNTER: Status: ACTIVE | Noted: 2021-02-09

## 2021-04-12 PROBLEM — E87.29 HIGH ANION GAP METABOLIC ACIDOSIS: Status: ACTIVE | Noted: 2019-12-06

## 2021-04-12 PROBLEM — I50.31 ACUTE DIASTOLIC HEART FAILURE (HCC): Status: ACTIVE | Noted: 2020-01-21

## 2021-04-12 PROBLEM — G93.41 ENCEPHALOPATHY, METABOLIC: Status: ACTIVE | Noted: 2019-12-06

## 2021-04-12 PROBLEM — N39.0 URINARY TRACT INFECTION WITH HEMATURIA: Status: ACTIVE | Noted: 2020-04-30

## 2021-04-12 PROBLEM — J18.9 PNEUMONIA DUE TO INFECTIOUS ORGANISM: Status: ACTIVE | Noted: 2020-04-30

## 2021-04-12 PROBLEM — R82.90 ABNORMAL URINE: Status: ACTIVE | Noted: 2020-04-30

## 2021-04-12 PROBLEM — Z86.69 PERSONAL HISTORY OF OTHER DISEASES OF THE NERVOUS SYSTEM AND SENSE ORGANS: Status: ACTIVE | Noted: 2017-04-21

## 2021-04-12 PROBLEM — H00.039 ABSCESS OF EYELID: Status: ACTIVE | Noted: 2018-04-30

## 2021-04-12 PROBLEM — H21.569 PUPIL IRREGULAR: Status: ACTIVE | Noted: 2018-04-30

## 2021-04-12 PROBLEM — I95.3 HYPOTENSION OF HEMODIALYSIS: Status: ACTIVE | Noted: 2020-10-03

## 2021-04-12 PROBLEM — R77.8 ELEVATED TROPONIN: Status: ACTIVE | Noted: 2019-12-06

## 2021-04-12 PROBLEM — E87.5 HYPERKALEMIA: Status: ACTIVE | Noted: 2019-12-06

## 2021-04-12 PROBLEM — J81.0 ACUTE PULMONARY EDEMA (HCC): Status: ACTIVE | Noted: 2019-12-06

## 2021-04-12 PROBLEM — R19.7 NAUSEA VOMITING AND DIARRHEA: Status: ACTIVE | Noted: 2020-03-18

## 2021-04-12 PROBLEM — D50.9 IRON DEFICIENCY ANEMIA, UNSPECIFIED: Status: ACTIVE | Noted: 2017-04-27

## 2021-04-12 PROBLEM — Z96.1 PSEUDOPHAKIA: Status: ACTIVE | Noted: 2018-04-30

## 2021-04-12 PROBLEM — J15.3: Status: ACTIVE | Noted: 2019-09-03

## 2021-04-12 PROBLEM — D31.40 IRIS NEVUS: Status: ACTIVE | Noted: 2018-04-30

## 2021-04-12 PROBLEM — E11.9 TYPE 2 DIABETES MELLITUS, WITH LONG-TERM CURRENT USE OF INSULIN (HCC): Status: ACTIVE | Noted: 2019-09-04

## 2021-04-12 PROBLEM — Z87.39 PERSONAL HISTORY OF OTHER DISEASES OF THE MUSCULOSKELETAL SYSTEM AND CONNECTIVE TISSUE: Status: ACTIVE | Noted: 2017-04-21

## 2021-04-12 PROBLEM — H25.9 AGE-RELATED CATARACT: Status: ACTIVE | Noted: 2018-04-30

## 2021-04-12 PROBLEM — R31.9 URINARY TRACT INFECTION WITH HEMATURIA: Status: ACTIVE | Noted: 2020-04-30

## 2021-04-12 PROBLEM — H54.7 SENSORY EXOTROPIA: Status: ACTIVE | Noted: 2018-04-30

## 2021-04-12 PROBLEM — R06.03 RESPIRATORY DISTRESS: Status: ACTIVE | Noted: 2020-01-19

## 2021-04-12 PROCEDURE — G8417 CALC BMI ABV UP PARAM F/U: HCPCS | Performed by: NURSE PRACTITIONER

## 2021-04-12 PROCEDURE — 1111F DSCHRG MED/CURRENT MED MERGE: CPT | Performed by: NURSE PRACTITIONER

## 2021-04-12 PROCEDURE — 3044F HG A1C LEVEL LT 7.0%: CPT | Performed by: NURSE PRACTITIONER

## 2021-04-12 PROCEDURE — 4004F PT TOBACCO SCREEN RCVD TLK: CPT | Performed by: NURSE PRACTITIONER

## 2021-04-12 PROCEDURE — 99214 OFFICE O/P EST MOD 30 MIN: CPT | Performed by: NURSE PRACTITIONER

## 2021-04-12 PROCEDURE — 2022F DILAT RTA XM EVC RTNOPTHY: CPT | Performed by: NURSE PRACTITIONER

## 2021-04-12 PROCEDURE — 3017F COLORECTAL CA SCREEN DOC REV: CPT | Performed by: NURSE PRACTITIONER

## 2021-04-12 PROCEDURE — G8427 DOCREV CUR MEDS BY ELIG CLIN: HCPCS | Performed by: NURSE PRACTITIONER

## 2021-04-12 RX ORDER — PANTOPRAZOLE SODIUM 40 MG/1
40 TABLET, DELAYED RELEASE ORAL DAILY
COMMUNITY
Start: 2021-04-08 | End: 2021-05-19 | Stop reason: SDUPTHER

## 2021-04-12 RX ORDER — METOCLOPRAMIDE 5 MG/1
1 TABLET ORAL 3 TIMES DAILY
COMMUNITY
Start: 2021-01-21

## 2021-04-12 RX ORDER — OXYCODONE AND ACETAMINOPHEN 7.5; 325 MG/1; MG/1
1 TABLET ORAL EVERY 6 HOURS PRN
COMMUNITY
Start: 2021-04-08 | End: 2021-05-21 | Stop reason: SDUPTHER

## 2021-04-12 RX ORDER — CALCITRIOL 0.5 UG/1
0.5 CAPSULE, LIQUID FILLED ORAL ONCE
COMMUNITY
Start: 2018-04-30

## 2021-04-12 RX ORDER — BACILLUS COAGULANS/INULIN 1B-250 MG
CAPSULE ORAL
COMMUNITY
Start: 2020-07-24

## 2021-04-12 RX ORDER — LEVOTHYROXINE SODIUM 0.03 MG/1
25 TABLET ORAL DAILY
COMMUNITY
Start: 2021-03-28

## 2021-04-12 RX ORDER — METOPROLOL TARTRATE 50 MG/1
50 TABLET, FILM COATED ORAL ONCE
COMMUNITY

## 2021-04-12 ASSESSMENT — ENCOUNTER SYMPTOMS
NAUSEA: 0
SORE THROAT: 0
SHORTNESS OF BREATH: 0
ABDOMINAL PAIN: 0
CHEST TIGHTNESS: 0
COLOR CHANGE: 0
VOMITING: 0
COUGH: 0
DIARRHEA: 0

## 2021-04-12 NOTE — OUTREACH NOTE
Medical Week 1 Survey      Responses   [facility] patient discharged from?  [place]   Does the patient have one of the following disease processes/diagnoses(primary or secondary)?  Other   Week 1 attempt successful?  Yes   Call start time  1413   Call end time  1420   Is patient permission given to speak with other caregiver?  Yes   List who call center can speak with  abiodun Urbano   Meds reviewed with patient/caregiver?  Yes   Is the patient having any side effects they believe may be caused by any medication additions or changes?  No   Does the patient have all medications ordered at discharge?  Yes   Prescription comments  Percocet being taken, but not working good at night especially   Is the patient taking all medications as directed (includes completed medication regime)?  Yes   Does the patient have a primary care provider?   Yes   Does the patient have an appointment with their PCP within 7 days of discharge?  No   Comments regarding PCP  Bharati Dahl   What is preventing the patient from scheduling follow up appointments within 7 days of discharge?  Haven't had time   Nursing Interventions  Educated patient on importance of making appointment   Has the patient kept scheduled appointments due by today?  N/A   Has home health visited the patient within 72 hours of discharge?  Yes   Home health comments  HH comes couple times week   Psychosocial issues?  No   Comments  HH  comes, she si slow to speak  but oriented, uses cane, walker and wheel chair, dialysis tues/thurs/sat   Did the patient receive a copy of their discharge instructions?  Yes   Nursing interventions  Reviewed instructions with patient, Educated on MyChart   What is the patient's perception of their health status since discharge?  Improving   Is the patient/caregiver able to teach back signs and symptoms related to disease process for when to call PCP?  Yes   Is the patient/caregiver able to teach back signs and symptoms related to  disease process for when to call 911?  Yes   Is the patient/caregiver able to teach back the hierarchy of who to call/visit for symptoms/problems? PCP, Specialist, Home health nurse, Urgent Care, ED, 911  Yes   If the patient is a current smoker, are they able to teach back resources for cessation?  -- [trying to quit smoking hs 3 packs left then will be castillo, no more smoking , she says]   Week 1 call completed?  Yes   Wrap up additional comments  no questions today, says feeling better          Rebecca Duran, RN

## 2021-04-12 NOTE — PATIENT INSTRUCTIONS
1. Follow up in office in 8 weeks to recheck her TSH  2. Please have labs done on 2nd floor before appt.    3. Follow up as needed

## 2021-04-12 NOTE — PROGRESS NOTES
Post-Discharge Transitional Care Management Services or Hospital Follow Up      Marimar Momin   YOB: 1960    Date of Office Visit:  4/12/2021  Date of Hospital Admission: 3/26//2021  Date of Hospital Discharge: 3/26/2021    Care management risk score Rising risk (score 2-5) and Complex Care (Scores >=6): 6     Non face to face  following discharge, date last encounter closed (first attempt may have been earlier): 3/29/2021  3:12 PM     Call initiated 2 business days of discharge: Yes    Patient Active Problem List   Diagnosis    Type 2 diabetes mellitus with ophthalmic complication, with long-term current use of insulin ([address]    Arthropathy    Proteinuria    Other disorders of phosphorus metabolism    HTN (hypertension)    Diabetes mellitus with ophthalmic complication ([address]    Breast density    Dialysis AV fistula malfunction ([address]    ESRD (end stage renal disease) on dialysis ([address]    CKD (chronic kidney disease) stage V requiring chronic dialysis ([address]    Acute diastolic heart failure (HCC)    Volume overload    SOB (shortness of breath)    Elevated troponin    Hyponatremia    Alkaline phosphatase elevation    Pleural effusion on right    Acute respiratory failure with hypoxia and hypercarbia (HCC)    Nausea vomiting and diarrhea    Non-compliance with renal dialysis (HCC)    End stage renal failure on dialysis (HCC)    Bilateral pubic rami fractures, closed, initial encounter ([address]    Altered mental state    Weakness    Abnormal urine    Abscess of eyelid    Age-related cataract    Allergy, unspecified, initial encounter    Anaphylactic shock, unspecified, initial encounter    Anemia due to protein deficiency    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    Chronic otitis media    Closed fracture of right proximal humerus    Corneal scar    Encephalopathy, metabolic    Eustachian tube dysfunction    Forehead laceration    Foreign body in conjunctival sac    Headache, unspecified    Heme positive stool    High anion gap metabolic acidosis    Hyperkalemia    Hypotension of hemodialysis    Hypothyroidism (acquired)    Iris nevus    Iron deficiency anemia, unspecified    Lymph node enlargement    Other chest pain    Personal history of other diseases of the musculoskeletal system and connective tissue    Pneumonia due to infectious organism    Pneumonia of left upper lobe due to group B Streptococcus (HCC)    Primary open angle glaucoma (POAG)    Proliferative diabetic retinopathy ([address]    Protein-calorie malnutrition ([address]    Pseudophakia    Acute pulmonary edema (HCC)    Pupil irregular    Secondary hyperparathyroidism of renal origin ([address]    Sensory exotropia    Tobacco abuse    Urinary tract infection with hematuria    Secondary hypertension, unspecified    Essential (primary) hypertension    Personal history of other endocrine, nutritional and metabolic disease    Personal history of other diseases of the nervous system and sense organs    Encounter for immunization    Encounter for screening for respiratory tuberculosis    Respiratory distress    Type 2 diabetes mellitus, with long-term current use of insulin (HCC)    Fluid overload, unspecified       Allergies   Allergen Reactions    Bupropion Nausea Only    Gabapentin      hallucinations    Sulfa Antibiotics      Causes hypoglycemia     Varenicline     Wellbutrin [Bupropion Hcl] Nausea Only    Azithromycin Rash    Levaquin [Levofloxacin] Rash    Penicillins Rash       Medications listed as ordered at the time of discharge from hospital      Medications marked \"taking\" at this time  Outpatient Medications Marked as Taking for the 4/12/21 encounter (Virtual Visit) with RAMONA Pryor - CNP   Medication Sig Dispense Refill    Heparin & NaCl Lock Flush (HEPARIN SODIUM FLUSH IV) 1,000 Units by IVPB OP route as needed      Bacillus Coagulans-Inulin (PROBIOTIC) 1-250 BILLION-MG CAPS Take by mouth      oxyCODONE-acetaminophen (PERCOCET) 7.5-325 MG per tablet Take 1 tablet by mouth every 6 hours as needed.  levothyroxine (SYNTHROID) 25 MCG tablet Take 25 mcg by mouth daily      calcitRIOL (ROCALTROL) 0.5 MCG capsule Take 0.5 mcg by mouth once      metoclopramide (REGLAN) 5 MG tablet Take 1 tablet by mouth 3 times daily      pantoprazole (PROTONIX) 40 MG tablet Take 40 mg by mouth daily      metoprolol tartrate (LOPRESSOR) 50 MG tablet Take 50 mg by mouth once      pregabalin (LYRICA) 50 MG capsule TAKE 1 CAPSULE BY MOUTH TWICE DAILY 60 capsule 0    fluticasone (FLONASE) 50 MCG/ACT nasal spray SHAKE LIQUID AND USE 2 SPRAYS IN EACH NOSTRIL TWICE DAILY 16 g 1    albuterol sulfate HFA (PROVENTIL HFA) 108 (90 Base) MCG/ACT inhaler Inhale 2 puffs into the lungs every 6 hours as needed for Wheezing 1 Inhaler 3    losartan (COZAAR) 50 MG tablet TAKE 1 TABLET BY MOUTH TWICE DAILY 180 tablet 1    famotidine (PEPCID) 20 MG tablet TAKE 1 TABLET BY MOUTH TWICE DAILY 60 tablet 11    insulin glargine (LANTUS SOLOSTAR) 100 UNIT/ML injection pen INJECT 10 UNITS INTO THE SKIN ONCE DAILY 15 mL 1    pravastatin (PRAVACHOL) 10 MG tablet TAKE 1 TABLET BY MOUTH EVERY NIGHT AT BEDTIME 30 tablet 11    B-D ULTRAFINE III SHORT PEN 31G X 8 MM MISC USE ONCE DAILY TO INJECT INSULIN DAILY 100 each 3    carvedilol (COREG) 12.5 MG tablet Take 12.5 mg by mouth 2 times daily (with meals)      blood glucose monitor kit and supplies 1 kit by Other route 3 times daily Test three times a day & as needed for symptoms of irregular blood glucose. 1 kit 0    Travoprost, BAK Free, (TRAVATAN Z) 0.004 % SOLN ophthalmic solution 1 drop nightly      sevelamer (RENVELA) 800 MG tablet Take 1 tablet by mouth daily       aspirin EC 81 MG EC tablet Take 81 mg by mouth daily      Blood Glucose Monitoring Suppl NIKOS by Does not apply route. Pt will also need new lancet device if not included in kit.  1 Device 0  Glucose Blood (BLOOD GLUCOSE TEST STRIPS) STRP Check blood sugar twice a day for recent medication adjustments. 100 strip 11    timolol (TIMOPTIC-XR) 0.5 % ophthalmic gel-forming Place 1 drop into the left eye 2 times daily       brimonidine (ALPHAGAN P) 0.15 % ophthalmic solution Place 1 drop into the left eye 3 times daily           Medications patient taking as of now reconciled against medications ordered at time of hospital discharge: Yes    Patient was see in the ER at [facility] 3/26/2021 for right arm pain due to a fall. Patient reported she tripped over her house shoes and landed on her right shoulder. She reported she was lightheaded before she fell. X-ray in the ER showed a closed nondisplaced fracture of proximal end of right humerus. She had a laceration on her forehead from the fall that has healed. Patient rates pain 10/10 today and reports she was taking ultram for pain. She also reports she went back to the emergency room on 4/1/2021 for pain control and they gave her percocet for pain, 12 tablets, every 6 hours as needed. Patient reports her arm is in a sling and she is still having pain. Patient denies any follow up with [facility] of [address] [place] [address] and I encouraged her follow up with them to see the progress of her healing and get appropriate treatment for her pain. [facility] to get a follow up. Patient reports she is still doing dialysis 3 times weekly, Tuesday, Thursday and Saturday without complications. Patient also reports she started taking lopressor after her ER visit. She denies taking her blood pressure at home recently. Patient reports her thyroid was low and she has been taking levothyroxine since her 3/26/2021 emergency room visit. Discussed follow up is needed in our office in 6-8 weeks with labs before her visit. Patient verbalized understanding.      HPI    Inpatient course: Discharge summary reviewed- see chart.    Interval history/Current status: There were no vitals filed for this visit. There is no height or weight on file to calculate BMI. Wt Readings from Last 3 Encounters:   02/27/21 136 lb 11.2 oz (62 kg)   11/11/20 147 lb (66.7 kg)   11/04/20 160 lb (72.6 kg)     BP Readings from Last 3 Encounters:   02/27/21 (!) 159/69   11/11/20 132/84   11/04/20 (!) 129/57       Review of Systems   Constitutional: Positive for activity change. Negative for fever. HENT: Negative for congestion, ear pain and sore throat. Respiratory: Negative for cough, chest tightness and shortness of breath. Cardiovascular: Negative for chest pain. Gastrointestinal: Negative for abdominal pain, diarrhea, nausea and vomiting. Genitourinary: Negative for frequency and urgency. Musculoskeletal: Negative for arthralgias and myalgias. Right shoulder pain   Skin: Negative for color change. Neurological: Negative for dizziness, weakness, light-headedness and numbness. Psychiatric/Behavioral: Negative for agitation. The patient is not nervous/anxious. Physical Exam    This visit was done virtually. Assessment/Plan:  1. Other closed displaced fracture of proximal end of right humerus with routine healing, subsequent encounter  Patient will call [facility] today to follow up with healing of her right humerous. Patient will address pain concerns with [facility]. - [place] DISCHARGE MEDS RECONCILED W/ CURRENT OUTPATIENT MED LIST    2. Type 2 diabetes mellitus with other ophthalmic complication, with long-term current use of insulin ([facility]      3. Hypothyroidism, unspecified type    4. CKD (chronic kidney disease) stage V requiring chronic dialysis ([facility]      5.  Essential (primary) hypertension  Patient encouraged to take blood pressure at home and keep a log.     - GA DISCHARGE MEDS RECONCILED W/ CURRENT OUTPATIENT MED LIST        Medical Decision Making: moderate complexity

## 2021-04-19 NOTE — OUTREACH NOTE
Medical Week 2 Survey      Responses   [facility] patient discharged from?  [place]   Does the patient have one of the following disease processes/diagnoses(primary or secondary)?  Other   Week 2 attempt successful?  No   Unsuccessful attempts  Attempt 1          Mayte Carty RN

## 2021-04-22 NOTE — OUTREACH NOTE
Medical Week 2 Survey      Responses   [facility] [place] patient discharged from?  [place]   Does the patient have one of the following disease processes/diagnoses(primary or secondary)?  Other   Week 2 attempt successful?  Yes   Call start time  0836   Discharge diagnosis  Encephalopathy, metabolic, due to uremia, ESRD on dialysis   Call end time  0838   Is patient permission given to speak with other caregiver?  Yes   List who call center can speak with  Sundeep,    Person spoke with today (if not patient) and relationship   and patient   Medication alerts for this patient  Per AVS   Meds reviewed with patient/caregiver?  Yes   Is the patient having any side effects they believe may be caused by any medication additions or changes?  No   Does the patient have all medications ordered at discharge?  Yes   Is the patient taking all medications as directed (includes completed medication regime)?  Yes   Comments regarding appointments  Per AVS   Does the patient have a primary care provider?   Yes   Does the patient have an appointment with their PCP within 7 days of discharge?  Greater than 7 days   Comments regarding PCP  Bharati Dahl   What is preventing the patient from scheduling follow up appointments within 7 days of discharge?  Earlier appointment not available   Nursing Interventions  Verified appointment date/time/provider   Has the patient kept scheduled appointments due by today?  N/A   Did the patient receive a copy of their discharge instructions?  Yes   Nursing interventions  Reviewed instructions with patient, Educated on MyChart   What is the patient's perception of their health status since discharge?  Improving   Is the patient/caregiver able to teach back signs and symptoms related to disease process for when to call PCP?  Yes   Is the patient/caregiver able to teach back signs and symptoms related to disease process for when to call 911?  Yes   Is the patient/caregiver able to teach back  the hierarchy of who to call/visit for symptoms/problems? PCP, Specialist, Home health nurse, Urgent Care, ED, 911  Yes   Additional teach back comments  Her  is preparing breakfast and getting her ready for HD.    Week 2 Call Completed?  Yes          Palmira Gonzalez RN

## 2021-04-27 DIAGNOSIS — M79.605 LEG PAIN, BILATERAL: ICD-10-CM

## 2021-04-27 DIAGNOSIS — M79.604 LEG PAIN, BILATERAL: ICD-10-CM

## 2021-04-28 PROBLEM — D64.9 CHRONIC ANEMIA: Status: ACTIVE | Noted: 2019-12-06

## 2021-04-28 PROBLEM — T82.590A DIALYSIS AV FISTULA MALFUNCTION, INITIAL ENCOUNTER (HCC): Status: ACTIVE | Noted: 2021-01-01

## 2021-04-28 NOTE — TELEPHONE ENCOUNTER
Patient advised of their upcoming surgery on 4/29/21 at 5:00. Pt had her prework all done last night in the ER.  NPO after midnight before surgery.  For the surgery, the patient will arrive at Dr. Bld #2 and then to Main Registration. Pt's CoVid testing was also done last night in the ER.  Pt was advised that if she takes heart or blood pressure medications in the morning, those are the only medications that she should take in the morning.  She stated understanding.

## 2021-04-28 NOTE — TELEPHONE ENCOUNTER
Sundeep Tawnya (boyfriend) called and said that he has pneumonia and is very sick.  He said that he is too weak to drive her here.  Dr. Becerra spoke with him to stress the importance of having this procedure done, however, he stated that there was just no way to get her here.  I spoke with Josie about it and she agreed that there was no way to get her here.  I will call them back to be sure and stress the importance.  If he says no, then we will just put it on hold and move the other appt up.      He said he was afraid that he would collapse and be admitted if he came.  He asked to reschedule and hope for the best.

## 2021-04-28 NOTE — PROGRESS NOTES
[facility] GASTROENTEROLOGY - OFFICE NOTE    2021    Mini Gentile   1960    Primary Physician: Bharati Dahl APRN    Chief Complaint   Patient presents with   • Follow-up     recent hospital stay   heme pos stool   anemia      HISTORY OF PRESENT ILLNESS    Mini Gentile is a 61 y.o. female presents for hospital f/u of heme pos stool.  Dr. Carter was consulted to see her 21 for heme pos stool.  No endoscopic procedures were done at that time.  She was discharged on 21 with discharge diagnosis of encephalopathy (metabolic due to uremia) , end-stage renal failure on dialysis, heme pos stool, anemia due to chronic kidney disease, and closed fracture of right proximal humerus.     She has no sign of active gi bleeding. No change in bowel habits. No weight loss. No n/v. No fever. No abdominal pain.     She has taken protonix daily for years.     Chronic anemia  This had been for several years. 21 hgb 10.3, BUN 21, creat 2.98.   No sign of active gi bleeding at this time.          Colonoscopy 3/2018 by Dr. Gomez,   noting numerous polyps ( path tubular adenomatous and hyperplastic) and diverticulosis.   She thinks had egd several years ago and cannot remember results.   No family history of colon cancer or polyps.       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    • Chronic otitis media    • Diabetes (CMS/HCC)    • End stage renal disease on dialysis (CMS/HCC)    • Eustachian tube dysfunction    • Glaucoma    • HTN (hypertension)    • Mucoid otitis media    • Noncompliance of patient with renal dialysis (CMS/HCC)    • Tobacco abuse        Past Surgical History:   Procedure Laterality Date   • ARTERIOVENOUS FISTULA     • CATARACT EXTRACTION WITH INTRAOCULAR LENS IMPLANT     •  SECTION     • CHOLECYSTECTOMY     • MYRINGOTOMY W/ TUBES Bilateral    • MYRINGOTOMY W/ TUBES Right    • TUBAL ABDOMINAL LIGATION         Outpatient  Medications Marked as Taking for the 4/28/21 encounter (Office Visit) with Tara Grijalva APRN   Medication Sig Dispense Refill   • albuterol sulfate  (90 Base) MCG/ACT inhaler Inhale 2 puffs Every 4 (Four) Hours As Needed for Wheezing. 18 g 0   • aspirin 81 MG EC tablet Take 81 mg by mouth Daily.     • atropine 1 % ophthalmic solution Administer 1 drop into the left eye Daily.     • brimonidine (ALPHAGAN) 0.2 % ophthalmic solution Administer 1 drop into the left eye 3 (Three) Times a Day.     • carvedilol (COREG) 6.25 MG tablet Take 6.25 mg by mouth 2 (Two) Times a Day With Meals.     • cholecalciferol (VITAMIN D3) 1000 units tablet Take 2,000 Units by mouth Daily.     • dorzolamide (TRUSOPT) 2 % ophthalmic solution Administer 1 drop into the left eye 2 (Two) Times a Day.     • fluticasone (FLONASE) 50 MCG/ACT nasal spray 2 sprays into the nostril(s) as directed by provider 2 (Two) Times a Day.     • insulin glargine (LANTUS) 100 UNIT/ML injection Inject 10 Units under the skin Every Night.     • ipratropium (ATROVENT) 0.02 % nebulizer solution Take 500 mcg by nebulization Every 4 (Four) Hours As Needed for Wheezing or Shortness of Air.     • lactobacillus acidophilus (RISAQUAD) capsule capsule Take 1 capsule by mouth Daily. 30 capsule 2   • Latanoprostene Bunod (Vyzulta) 0.024 % solution Administer 1 drop into the left eye Every Night. Left eye      • levothyroxine (SYNTHROID, LEVOTHROID) 25 MCG tablet Take 1 tablet by mouth Daily. 30 tablet 0   • Netarsudil Dimesylate (RHOPRESSA) 0.02 % solution Administer 1 drop into the left eye Every Night.     • ondansetron (ZOFRAN) 4 MG tablet Take 1 tablet by mouth Every 6 (Six) Hours As Needed for Nausea or Vomiting. 24 tablet 2   • oxyCODONE-acetaminophen (PERCOCET) 7.5-325 MG per tablet Take 1 tablet by mouth Every 6 (Six) Hours As Needed for Severe Pain . (Patient taking differently: Take 1 tablet by mouth As Needed for Severe Pain .) 12 tablet 0   •  "pantoprazole (PROTONIX) 40 MG EC tablet Take 1 tablet by mouth Daily. 30 tablet 1   • pravastatin (PRAVACHOL) 10 MG tablet Take 10 mg by mouth Every Night.     • pregabalin (LYRICA) 50 MG capsule Take 50 mg by mouth 2 (Two) Times a Day.     • sertraline (ZOLOFT) 25 MG tablet Take 25 mg by mouth Daily.     • sevelamer (RENAGEL) 800 MG tablet Take 2,400 mg by mouth 3 (Three) Times a Day With Meals.     • timolol (TIMOPTIC) 0.5 % ophthalmic solution Administer 1 drop into the left eye Every Morning.         Allergies   Allergen Reactions   • Bupropion Nausea Only   • Chantix [Varenicline]    • Sulfa Antibiotics    • Azithromycin Rash   • Levofloxacin Rash   • Penicillins Rash       Social History     Socioeconomic History   • Marital status: Single     Spouse name: Not on file   • Number of children: Not on file   • Years of education: Not on file   • Highest education level: Not on file   Tobacco Use   • Smoking status: Current Every Day Smoker     Packs/day: 2.00     Years: 6.00     Pack years: 12.00     Types: Cigarettes   • Smokeless tobacco: Never Used   Substance and Sexual Activity   • Alcohol use: No   • Drug use: No   • Sexual activity: Defer       Family History   Problem Relation Age of Onset   • Heart disease Mother    • Diabetes Father    • Colon cancer Neg Hx    • Colon polyps Neg Hx        Review of Systems   Constitutional: Negative for chills, fever and unexpected weight change.   Respiratory: Negative for cough, shortness of breath and wheezing.    Cardiovascular: Negative for chest pain and palpitations.   Gastrointestinal: Negative for abdominal distention, abdominal pain, anal bleeding, blood in stool, constipation, diarrhea, nausea and vomiting.        Vitals:    04/28/21 1411   BP: 158/62   Pulse: 76   Temp: 96.9 °F (36.1 °C)   SpO2: 98%   Weight: 60.8 kg (134 lb)   Height: 147.3 cm (58\")      Body mass index is 28.01 kg/m².    Physical Exam  Vitals reviewed.   Constitutional:       General: She " is not in acute distress.  Cardiovascular:      Rate and Rhythm: Normal rate and regular rhythm.      Heart sounds: Normal heart sounds.   Pulmonary:      Effort: Pulmonary effort is normal.      Breath sounds: Normal breath sounds.   Abdominal:      General: Bowel sounds are normal. There is no distension.      Palpations: Abdomen is soft.      Tenderness: There is no abdominal tenderness.   Skin:     General: Skin is warm and dry.   Neurological:      Mental Status: She is alert.         Results for orders placed or performed during the hospital encounter of 04/27/21   COVID-19, [facility]OTT IN-HOUSE,NASAL Swab (NO TRANSPORT MEDIA) 2 HR TAT - Swab, Nasopharynx    Specimen: Nasopharynx; Swab   Result Value Ref Range    COVID19 Presumptive Negative Presumptive Negative - Ref. Range   Comprehensive Metabolic Panel    Specimen: Blood   Result Value Ref Range    Glucose 122 (H) 65 - 99 mg/dL    BUN 21 8 - 23 mg/dL    Creatinine 2.98 (H) 0.57 - 1.00 mg/dL    Sodium 134 (L) 136 - 145 mmol/L    Potassium 3.2 (L) 3.5 - 5.2 mmol/L    Chloride 91 (L) 98 - 107 mmol/L    CO2 28.0 22.0 - 29.0 mmol/L    Calcium 9.1 8.6 - 10.5 mg/dL    Total Protein 7.4 6.0 - 8.5 g/dL    Albumin 4.20 3.50 - 5.20 g/dL    ALT (SGPT) 8 1 - 33 U/L    AST (SGOT) 16 1 - 32 U/L    Alkaline Phosphatase 236 (H) 39 - 117 U/L    Total Bilirubin 0.4 0.0 - 1.2 mg/dL    eGFR Non African Amer 16 (L) >60 mL/min/1.73    Globulin 3.2 gm/dL    A/G Ratio 1.3 g/dL    BUN/Creatinine Ratio 7.0 7.0 - 25.0    Anion Gap 15.0 5.0 - 15.0 mmol/L   Protime-INR    Specimen: Blood   Result Value Ref Range    Protime 14.1 (H) 11.5 - 13.4 Seconds    INR 1.18 (H) 0.91 - 1.09   aPTT    Specimen: Blood   Result Value Ref Range    PTT 38.2 (H) 24.1 - 35.0 seconds   CBC Auto Differential    Specimen: Blood   Result Value Ref Range    WBC 4.36 3.40 - 10.80 10*3/mm3    RBC 3.35 (L) 3.77 - 5.28 10*6/mm3    Hemoglobin 10.3 (L) 12.0 - 15.9 g/dL    Hematocrit 32.3 (L) 34.0 - 46.6 %    MCV 96.4  79.0 - 97.0 fL    MCH 30.7 26.6 - 33.0 pg    MCHC 31.9 31.5 - 35.7 g/dL    RDW 16.9 (H) 12.3 - 15.4 %    RDW-SD 59.6 (H) 37.0 - 54.0 fl    MPV 8.8 6.0 - 12.0 fL    Platelets 173 140 - 450 10*3/mm3    Neutrophil % 72.0 42.7 - 76.0 %    Lymphocyte % 15.6 (L) 19.6 - 45.3 %    Monocyte % 7.1 5.0 - 12.0 %    Eosinophil % 4.4 0.3 - 6.2 %    Basophil % 0.7 0.0 - 1.5 %    Immature Grans % 0.2 0.0 - 0.5 %    Neutrophils, Absolute 3.14 1.70 - 7.00 10*3/mm3    Lymphocytes, Absolute 0.68 (L) 0.70 - 3.10 10*3/mm3    Monocytes, Absolute 0.31 0.10 - 0.90 10*3/mm3    Eosinophils, Absolute 0.19 0.00 - 0.40 10*3/mm3    Basophils, Absolute 0.03 0.00 - 0.20 10*3/mm3    Immature Grans, Absolute 0.01 0.00 - 0.05 10*3/mm3    nRBC 0.0 0.0 - 0.2 /100 WBC   ECG 12 Lead   Result Value Ref Range    QT Interval 390 ms    QTC Interval 449 ms           ASSESSMENT AND PLAN    Assessment/Plan     Diagnoses and all orders for this visit:    1. Heme positive stool (Primary)  -     Case Request; Standing  -     Case Request    2. Chronic anemia  -     Case Request; Standing  -     Case Request    Other orders  -     Follow Anesthesia Guidelines / Protocol; Future  -     Implement Anesthesia Orders Day of Procedure; Standing  -     Obtain Informed Consent; Standing  -     Obtain Informed Consent; Future  -     polyethylene glycol (Golytely) 236 g solution; Take 4,000 mL by mouth 1 (One) Time for 1 dose. Take as directed per instruction sheet.  Dispense: 4000 mL; Refill: 0      No sign of active GI bleeding. I would recommend repeat colonoscopy with egd and she is agreeable.  I also recommend continue protonix daily.             ESOPHAGOGASTRODUODENOSCOPY WITH ANESTHESIA (N/A), COLONOSCOPY WITH ANESTHESIA (N/A)   All risks, benefits, alternatives, and indications of colonoscopy procedure have been discussed with the patient. Risks to include perforation of the colon requiring possible surgery or colostomy, risk of bleeding from biopsies or removal of  colon tissue, possibility of missing a colon polyp or cancer, or adverse drug reaction.  Benefits to include the diagnosis and management of disease of the colon and rectum. Alternatives to include barium enema, radiographic evaluation, lab testing or no intervention. Pt verbalizes understanding and agrees.     Risk, benefits, and alternatives of endoscopy were explained in full.  They understand that there is a risk of bleeding, perforation, and infection.  The risk of perforation goes up with esophageal dilation.  Other options to evaluate UGI complaints could involve barium swallow or UGI series, but these would be diagnostic tests only.  Patient was given time to ask questions.  I answered them to their satisfaction and they are agreeable to proceeding             Body mass index is 28.01 kg/m².    Patient's Body mass index is 28.01 kg/m². BMI is above normal parameters. Recommendations include: recommend weight loss, no f/u .           ANTHONY Strickland    EMR Dragon/transcription disclaimer:  Much of this encounter note is electronic transcription/translation of spoken language to printed text.  The electronic translation of spoken language may be erroneous, or at times, nonsensical words or phrases may be inadvertently transcribed.  Although I have reviewed the note for such errors, some may still exist.

## 2021-04-29 RX ORDER — PREGABALIN 50 MG/1
CAPSULE ORAL
Qty: 60 CAPSULE | Refills: 0 | Status: SHIPPED | OUTPATIENT
Start: 2021-04-29 | End: 2021-06-03

## 2021-04-30 NOTE — OUTREACH NOTE
Medical Week 3 Survey      Responses   [facility] patient discharged from?  [place]   Does the patient have one of the following disease processes/diagnoses(primary or secondary)?  Other   Week 3 attempt successful?  Yes   Call start time  1506   Call end time  1509   Meds reviewed with patient/caregiver?  Yes   Is the patient having any side effects they believe may be caused by any medication additions or changes?  No   Does the patient have all medications ordered at discharge?  Yes   Is the patient taking all medications as directed (includes completed medication regime)?  Yes   Does the patient have a primary care provider?   Yes   Has the patient kept scheduled appointments due by today?  Yes   Comments  dialysis Tu-Thurs-Sat   Has home health visited the patient within 72 hours of discharge?  Yes   Home health comments  States was discharged by  today.   Psychosocial issues?  No   Did the patient receive a copy of their discharge instructions?  Yes   Nursing interventions  Reviewed instructions with patient   What is the patient's perception of their health status since discharge?  Improving   Is the patient/caregiver able to teach back signs and symptoms related to disease process for when to call PCP?  Yes   Is the patient/caregiver able to teach back signs and symptoms related to disease process for when to call 911?  Yes   Is the patient/caregiver able to teach back the hierarchy of who to call/visit for symptoms/problems? PCP, Specialist, Home health nurse, Urgent Care, ED, 911  Yes   Week 3 Call Completed?  Yes   Wrap up additional comments  States is improving-was released from  today. Denies any needs today.          Katerine Capps RN

## 2021-05-04 NOTE — TELEPHONE ENCOUNTER
Tried to call the patient to let her know that her arrival time for surgery has been changed to 6:00 am, however, the VM was full.

## 2021-05-05 NOTE — TELEPHONE ENCOUNTER
Tried to call the patient again to give her the surgery and prework information, but she didn't answer and the VM is full.  I went ahead and put a letter in the mail for her today to contact our office about this information.

## 2021-05-10 NOTE — TELEPHONE ENCOUNTER
Patient advised of their upcoming pre-work on.5/11/21 at 7:45 am and surgery on 5/13/21 at 6:00 am. NPO after midnight before surgery.  Both the pre-work and surgery, will be at Dr. Bld #2 and then to Main Registration. The hospital will call to schedule CoVid testing.  Also advised that if she takes heart or blood pressure medication in the mornings, she may do so the morning of surgery with a sip of water.  No other medications.

## 2021-05-11 NOTE — TELEPHONE ENCOUNTER
This person is the patient's .  He called to make sure that the patient had all of the information regarding surgery correct.  I asked if she went to prework today and he said no.  He said that she told him that it was Wednesday.  I told him that I would have to call to see if we could do the prework and CoVid testing tomorrow.  I called and was able to schedule them both.  I scheduled the prework for 5/12/21 at 7:45 and the CoVid test to follow.  I gave this information to him and he said that he would have her here.

## 2021-05-12 PROBLEM — R79.89 ELEVATED TROPONIN: Status: RESOLVED | Noted: 2019-12-06 | Resolved: 2021-05-12

## 2021-05-12 PROBLEM — R77.8 ELEVATED TROPONIN: Status: RESOLVED | Noted: 2019-12-06 | Resolved: 2021-05-12

## 2021-05-12 PROBLEM — Z11.1 ENCOUNTER FOR SCREENING FOR RESPIRATORY TUBERCULOSIS: Status: RESOLVED | Noted: 2017-05-17 | Resolved: 2021-05-12

## 2021-05-13 NOTE — ANESTHESIA PREPROCEDURE EVALUATION
Anesthesia Evaluation     Patient summary reviewed   no history of anesthetic complications:  NPO Solid Status: > 6 hours  NPO Liquid Status: > 6 hours           Airway   Mallampati: II  Dental    (+) upper dentures and lower dentures    Pulmonary    Cardiovascular   Exercise tolerance: good (4-7 METS)    (+) hypertension, CHF (preserved LVEF) ,   (-) pacemaker      Neuro/Psych  (-) seizures, CVA  GI/Hepatic/Renal/Endo    (+)   renal disease (dialysis Tuesday-denies problems ) dialysis, diabetes mellitus type 2,     Musculoskeletal     Abdominal    Substance History      OB/GYN          Other                        Anesthesia Plan    ASA 3     MAC     intravenous induction     Anesthetic plan, all risks, benefits, and alternatives have been provided, discussed and informed consent has been obtained with: patient.

## 2021-05-13 NOTE — ANESTHESIA POSTPROCEDURE EVALUATION
Patient: Mini Gentile    Procedure Summary     Date: 05/13/21 Room / Location: [facility] OR  /  PAD HYBRID OR 12    Anesthesia Start: 0946 Anesthesia Stop: 1049    Procedure: LEFT UPPER EXTREMITY FISTULOGRAM, BALLOON ANGIOPLASTY (Left Groin) Diagnosis:       Dialysis AV fistula malfunction, initial encounter (CMS/HCC)      (Dialysis AV fistula malfunction, initial encounter (CMS/[facility]) [T82.590A])    Surgeons: Vic Becerra MD Provider: Dar Lim CRNA    Anesthesia Type: MAC ASA Status: 3          Anesthesia Type: MAC    Vitals  Vitals Value Taken Time   /50 05/13/21 1240   Temp 97.3 °F (36.3 °C) 05/13/21 1225   Pulse 58 05/13/21 1242   Resp 18 05/13/21 1240   SpO2 95 % 05/13/21 1242   Vitals shown include unvalidated device data.        Post Anesthesia Care and Evaluation    Patient location during evaluation: PACU  Patient participation: complete - patient participated  Level of consciousness: awake and alert  Pain management: adequate  Airway patency: patent  Anesthetic complications: No anesthetic complications  PONV Status: none  Cardiovascular status: acceptable and hemodynamically stable  Respiratory status: acceptable  Hydration status: acceptable    Comments: Blood pressure (!) 78/51, pulse 59, temperature 97.3 °F (36.3 °C), temperature source Temporal, resp. rate 17, SpO2 93 %, not currently breastfeeding.    I was notified by CRNA that pt had aspiration event intra op. She was intubated without paralytic. Post procedure she was breathing well and extubated, brought to PACU with high 90s Spo2 on simple mask. I came to see her. SHe is complaining that she cannot breath. Spo2 is 90-92% on simple mask 8 L. I ordered CXR which shows moderate right pleural effusion which is chronic. I ordered her a duoneb and placed her on CPAP. Discussed with Dr Becerra. Pt will be admitted to ICU for closer monitoring.

## 2021-05-13 NOTE — ANESTHESIA PROCEDURE NOTES
Airway  Airway not difficult    General Information and Staff    Patient location during procedure: OR  CRNA: Dar Lim CRNA    Indications and Patient Condition  Indications for airway management: airway protection    Preoxygenated: yes      Final Airway Details  Final airway type: endotracheal airway      Successful airway: ETT  Cuffed: yes   Successful intubation technique: direct laryngoscopy  Endotracheal tube insertion site: oral  Blade: Jv  Blade size: 3  ETT size (mm): 7.0  Cormack-Lehane Classification: grade I - full view of glottis  Placement verified by: chest auscultation and capnometry   Cuff volume (mL): 6  Measured from: lips  ETT/EBT  to lips (cm): 21  Number of attempts at approach: 1  Assessment: lips, teeth, and gum same as pre-op and atraumatic intubation    Additional Comments  ATRAUMATIC INTUBATION

## 2021-05-15 PROBLEM — J98.01 BRONCHOSPASM, ACUTE: Status: ACTIVE | Noted: 2021-01-01

## 2021-05-15 PROBLEM — T17.908A ASPIRATION INTO AIRWAY: Status: ACTIVE | Noted: 2021-01-01

## 2021-05-16 NOTE — OUTREACH NOTE
Prep Survey      Responses   [facility] facility patient discharged from?  [place]   Is LACE score < 7 ?  No   Emergency Room discharge w/ pulse ox?  No   Eligibility  Readm Mgmt   Discharge diagnosis  **Dialysis AV fistula malfunction, Aspiration into airway resulting to bronchospasm/distress   Does the patient have one of the following disease processes/diagnoses(primary or secondary)?  Other   Does the patient have Home health ordered?  No   Is there a DME ordered?  No   Prep survey completed?  Yes          Deana Hay RN         Single

## 2021-05-18 ENCOUNTER — TELEPHONE (OUTPATIENT)
Dept: PRIMARY CARE CLINIC | Age: 61
End: 2021-05-18

## 2021-05-18 NOTE — OUTREACH NOTE
Medical Week 1 Survey      Responses   [facility] patient discharged from?  [place]   Does the patient have one of the following disease processes/diagnoses(primary or secondary)?  Other   Week 1 attempt successful?  No   Unsuccessful attempts  Attempt 1          Gabriela Saucedo RN

## 2021-05-18 NOTE — TELEPHONE ENCOUNTER
[facility] Transitions Initial Follow Up Call    Outreach made within 2 business days of discharge: Yes    Patient: London Galeazzi   Patient : 1960   MRN: [id number]    Reason for Admission: Bleeding from AV Fistula, had Fistulagram  Discharge Date: 21       Spoke with: Partner    Discharge department/facility: [facility]    TCM Interactive Patient Contact:  Was patient able to fill all prescriptions: Yes  Was patient instructed to bring all medications to the follow-up visit: Yes  Is patient taking all medications as directed in the discharge summary? Yes  Does patient understand their discharge instructions: Yes  Does patient have questions or concerns that need addressed prior to 7-14 day follow up office visit: no    Spoke with partner Taina Ross. He states she is feeling much better. Was able to get up and dress herself, do dishes. Her appetite is good. She is having normal bladder and bowel function. She does not have any fever, chills or signs of infection.     Scheduled appointment with PCP within 7-14 days- yes    Follow Up  Future Appointments   Date Time Provider Darrell Price   2021 10:00 AM RAMONA Stout [facility]   2021 11:15 AM RAMONA Stout [address] Size Of Margin In Cm: 0.5

## 2021-05-19 ENCOUNTER — TELEPHONE (OUTPATIENT)
Dept: PRIMARY CARE CLINIC | Age: 61
End: 2021-05-19

## 2021-05-19 ENCOUNTER — VIRTUAL VISIT (OUTPATIENT)
Dept: PRIMARY CARE CLINIC | Age: 61
End: 2021-05-19
Payer: MEDICARE

## 2021-05-19 DIAGNOSIS — I25.10 CORONARY ARTERY DISEASE INVOLVING NATIVE HEART WITHOUT ANGINA PECTORIS, UNSPECIFIED VESSEL OR LESION TYPE: Primary | ICD-10-CM

## 2021-05-19 DIAGNOSIS — S42.001D CLOSED NONDISPLACED FRACTURE OF RIGHT CLAVICLE WITH ROUTINE HEALING, UNSPECIFIED PART OF CLAVICLE, SUBSEQUENT ENCOUNTER: ICD-10-CM

## 2021-05-19 PROCEDURE — 99214 OFFICE O/P EST MOD 30 MIN: CPT | Performed by: FAMILY MEDICINE

## 2021-05-19 PROCEDURE — 1111F DSCHRG MED/CURRENT MED MERGE: CPT | Performed by: FAMILY MEDICINE

## 2021-05-19 RX ORDER — OXYCODONE HYDROCHLORIDE AND ACETAMINOPHEN 5; 325 MG/1; MG/1
1 TABLET ORAL EVERY 8 HOURS PRN
Qty: 21 TABLET | Refills: 0 | Status: CANCELLED | OUTPATIENT
Start: 2021-05-19 | End: 2021-05-26

## 2021-05-19 RX ORDER — PANTOPRAZOLE SODIUM 40 MG/1
40 TABLET, DELAYED RELEASE ORAL DAILY
Qty: 30 TABLET | Refills: 1 | Status: SHIPPED | OUTPATIENT
Start: 2021-05-19 | End: 2021-05-19

## 2021-05-19 RX ORDER — PANTOPRAZOLE SODIUM 40 MG/1
40 TABLET, DELAYED RELEASE ORAL DAILY
Qty: 90 TABLET | Refills: 0 | Status: SHIPPED | OUTPATIENT
Start: 2021-05-19

## 2021-05-19 ASSESSMENT — PATIENT HEALTH QUESTIONNAIRE - PHQ9
1. LITTLE INTEREST OR PLEASURE IN DOING THINGS: 0
SUM OF ALL RESPONSES TO PHQ QUESTIONS 1-9: 0
2. FEELING DOWN, DEPRESSED OR HOPELESS: 0
SUM OF ALL RESPONSES TO PHQ QUESTIONS 1-9: 0

## 2021-05-19 NOTE — TELEPHONE ENCOUNTER
Called pt to let her know that we cannot send in percocet for her until she comes in and renews her med contract and does a UDS.  I told her she can come in on the nurse schedule between 8 am and 12 pm and 1pm to 3:30 pm. Noel JAQUEZ

## 2021-05-19 NOTE — PROGRESS NOTES
Post-Discharge Transitional Care Management Services or Hospital Follow Up      Antoine Gonzales   YOB: 1960    Date of Office Visit:  5/19/2021  Date of Hospital Admission: 5/13/2021  Date of Hospital Discharge: 5/15/2021    Care management risk score Rising risk (score 2-5) and Complex Care (Scores >=6): 6     Non face to face  following discharge, date last encounter closed (first attempt may have been earlier): 5/18/2021 10:53 AM 5/18/2021 10:53 AM    Call initiated 2 business days of discharge: Yes    Patient Active Problem List   Diagnosis    Type 2 diabetes mellitus with ophthalmic complication, with long-term current use of insulin ([address]    Arthropathy    Proteinuria    Other disorders of phosphorus metabolism    HTN (hypertension)    Diabetes mellitus with ophthalmic complication ([address]    Breast density    Dialysis AV fistula malfunction ([address]    ESRD (end stage renal disease) on dialysis ([address]    CKD (chronic kidney disease) stage V requiring chronic dialysis ([address]    Acute diastolic heart failure (HCC)    Volume overload    SOB (shortness of breath)    Hyponatremia    Alkaline phosphatase elevation    Pleural effusion on right    Acute respiratory failure with hypoxia and hypercarbia (HCC)    Nausea vomiting and diarrhea    Non-compliance with renal dialysis (HCC)    End stage renal failure on dialysis (HCC)    Bilateral pubic rami fractures, closed, initial encounter ([address]    Altered mental state    Weakness    Abnormal urine    Abscess of eyelid    Age-related cataract    Allergy, unspecified, initial encounter    Anaphylactic shock, unspecified, initial encounter    Anemia due to protein deficiency    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    Chronic otitis media    Closed fracture of right proximal humerus    Corneal scar    Encephalopathy, metabolic    Eustachian tube dysfunction    Forehead laceration    Foreign body in conjunctival sac  Headache, unspecified    Heme positive stool    High anion gap metabolic acidosis    Hyperkalemia    Hypotension of hemodialysis    Hypothyroidism (acquired)    Iris nevus    Iron deficiency anemia, unspecified    Lymph node enlargement    Other chest pain    Personal history of other diseases of the musculoskeletal system and connective tissue    Pneumonia due to infectious organism    Pneumonia of left upper lobe due to group B Streptococcus (HCC)    Primary open angle glaucoma (POAG)    Proliferative diabetic retinopathy ([address]    Protein-calorie malnutrition ([address]    Pseudophakia    Acute pulmonary edema (HCC)    Pupil irregular    Secondary hyperparathyroidism of renal origin ([address]    Sensory exotropia    Tobacco abuse    Urinary tract infection with hematuria    Secondary hypertension, unspecified    Essential (primary) hypertension    Personal history of other endocrine, nutritional and metabolic disease    Personal history of other diseases of the nervous system and sense organs    Encounter for immunization    Respiratory distress    Type 2 diabetes mellitus, with long-term current use of insulin (HCC)    Fluid overload, unspecified       Allergies   Allergen Reactions    Bupropion Nausea Only    Gabapentin      hallucinations    Sulfa Antibiotics      Causes hypoglycemia     Varenicline     Wellbutrin [Bupropion Hcl] Nausea Only    Azithromycin Rash    Levaquin [Levofloxacin] Rash    Penicillins Rash       Medications listed as ordered at the time of discharge from hospital  Medications marked \"taking\" at this time  Outpatient Medications Marked as Taking for the 5/19/21 encounter (Virtual Visit) with Alley Rodriguez MD   Medication Sig Dispense Refill    pregabalin (LYRICA) 50 MG capsule TAKE 1 CAPSULE BY MOUTH TWICE DAILY 60 capsule 0    Heparin & NaCl Lock Flush (HEPARIN SODIUM FLUSH IV) 1,000 Units by IVPB OP route as needed      Bacillus Coagulans-Inulin (PROBIOTIC) 1-250 BILLION-MG CAPS Take by mouth      levothyroxine (SYNTHROID) 25 MCG tablet Take 25 mcg by mouth daily      calcitRIOL (ROCALTROL) 0.5 MCG capsule Take 0.5 mcg by mouth once      pantoprazole (PROTONIX) 40 MG tablet Take 40 mg by mouth daily      metoprolol tartrate (LOPRESSOR) 50 MG tablet Take 50 mg by mouth once      fluticasone (FLONASE) 50 MCG/ACT nasal spray SHAKE LIQUID AND USE 2 SPRAYS IN EACH NOSTRIL TWICE DAILY 16 g 1    ipratropium-albuterol (DUONEB) 0.5-2.5 (3) MG/3ML SOLN nebulizer solution Inhale 3 mLs into the lungs every 4 hours 360 mL 1    albuterol sulfate HFA (PROVENTIL HFA) 108 (90 Base) MCG/ACT inhaler Inhale 2 puffs into the lungs every 6 hours as needed for Wheezing 1 Inhaler 3    losartan (COZAAR) 50 MG tablet TAKE 1 TABLET BY MOUTH TWICE DAILY 180 tablet 1    insulin glargine (LANTUS SOLOSTAR) 100 UNIT/ML injection pen INJECT 10 UNITS INTO THE SKIN ONCE DAILY 15 mL 1    pravastatin (PRAVACHOL) 10 MG tablet TAKE 1 TABLET BY MOUTH EVERY NIGHT AT BEDTIME 30 tablet 11    ondansetron (ZOFRAN) 4 MG tablet Take 4 mg by mouth every 6 hours      dicyclomine (BENTYL) 10 MG capsule Take 1 capsule by mouth 4 times daily 120 capsule 5    B-D ULTRAFINE III SHORT PEN 31G X 8 MM MISC USE ONCE DAILY TO INJECT INSULIN DAILY 100 each 3    Cholecalciferol (VITAMIN D3) 1000 units CAPS Take by mouth      Travoprost, BAK Free, (TRAVATAN Z) 0.004 % SOLN ophthalmic solution 1 drop nightly      sevelamer (RENVELA) 800 MG tablet Take 1 tablet by mouth daily       aspirin EC 81 MG EC tablet Take 81 mg by mouth daily      Blood Glucose Monitoring Suppl NIKOS by Does not apply route. Pt will also need new lancet device if not included in kit. 1 Device 0    Glucose Blood (BLOOD GLUCOSE TEST STRIPS) STRP Check blood sugar twice a day for recent medication adjustments.  100 strip 11    Insulin Syringe-Needle U-100 (ACCUSURE INS SYR 1CC/31GX5/16\") 31G X 5/16\" 1 ML MISC by Does not apply route. 100 each 3    timolol (TIMOPTIC-XR) 0.5 % ophthalmic gel-forming Place 1 drop into the left eye 2 times daily       dorzolamide (TRUSOPT) 2 % ophthalmic solution Place 1 drop into the left eye 2 times daily       brimonidine (ALPHAGAN P) 0.15 % ophthalmic solution Place 1 drop into the left eye 3 times daily       homatropine 5 % ophthalmic solution Place 1 drop into the left eye daily           Medications patient taking as of now reconciled against medications ordered at time of hospital discharge: Yes    Chief Complaint   Patient presents with    Follow-Up from [facility]     went into cardiac arrest during procedure on left arm, feels much better now     Clavicle Injury     fell 4 weeks ago and fractured collar bone, has sling, would like a refill on oxycodone        History of Present illness - Follow up of Hospital diagnosis(es): Cardiac arrest during a procedure (ballooning fistula)    Inpatient course: Discharge summary reviewed- see chart. Interval history/Current status: This is a telephone encounter for this patient who underwent a procedure at [facility] and apparently during the procedure went into cardia arrest. She states that when she woke up she was already in the cardiac unit. Pt is a dialysis pt. She has not followed up with cardiologist yet but she prefers to see someone from the [id number] group. She denies CP now. No shortness of breath. She does have some coughing spells. She is a 1 pack per day smoker. She state that she tried to quit smoking in the past using nicotine patches but it did not work. She also tried Chantix but was not successful either. Pt also has a broken R clavicle sustained from a fall in her bathroom. She states that she tripped on her house shoes and fell hitting the edge of the baseboard. This incident happened 3-4 weeks ago. She states she is still in so much pain. She takes Tylenol now without much relief.  She is not able to take any NSAIDs because of her kidney disease. She was Rx few Percocets by NP Hawk Rodrigez last 17/28. Pt states she only took them once a day. There were no vitals filed for this visit. There is no height or weight on file to calculate BMI. Wt Readings from Last 3 Encounters:   02/27/21 136 lb 11.2 oz (62 kg)   11/11/20 147 lb (66.7 kg)   11/04/20 160 lb (72.6 kg)     BP Readings from Last 3 Encounters:   02/27/21 (!) 159/69   11/11/20 132/84   11/04/20 (!) 129/57       A comprehensive review of systems was negative except for what was noted in the HPI. Physical Exam:  none    Assessment/Plan:  1. Coronary artery disease involving native heart without angina pectoris, unspecified vessel or lesion type      2.  Closed nondisplaced fracture of right clavicle with routine healing, unspecified part of clavicle, subsequent encounter    PLAN:  Will repeat x-ray in 2 weeks to check for healing  Refer to cardiologist [facility]) per pts request  Continue all maintenance medications  Keep f/u with all other providers  Call with new concerns     Medical Decision Making: low complexity

## 2021-05-21 ENCOUNTER — OFFICE VISIT (OUTPATIENT)
Dept: PRIMARY CARE CLINIC | Age: 61
End: 2021-05-21
Payer: MEDICARE

## 2021-05-21 DIAGNOSIS — S42.001D CLOSED NONDISPLACED FRACTURE OF RIGHT CLAVICLE WITH ROUTINE HEALING, UNSPECIFIED PART OF CLAVICLE, SUBSEQUENT ENCOUNTER: Primary | ICD-10-CM

## 2021-05-21 DIAGNOSIS — Z79.899 MEDICATION MANAGEMENT: Primary | ICD-10-CM

## 2021-05-21 LAB
ALCOHOL URINE: NORMAL
AMPHETAMINE SCREEN, URINE: NORMAL
BARBITURATE SCREEN, URINE: NORMAL
BENZODIAZEPINE SCREEN, URINE: NORMAL
BUPRENORPHINE URINE: NORMAL
COCAINE METABOLITE SCREEN URINE: NORMAL
FENTANYL SCREEN, URINE: NORMAL
GABAPENTIN SCREEN, URINE: NORMAL
MDMA URINE: NORMAL
METHADONE SCREEN, URINE: NORMAL
METHAMPHETAMINE, URINE: NORMAL
OPIATE SCREEN URINE: NORMAL
OXYCODONE SCREEN URINE: NORMAL
PHENCYCLIDINE SCREEN URINE: NORMAL
PROPOXYPHENE SCREEN, URINE: NORMAL
SYNTHETIC CANNABINOIDS(K2) SCREEN, URINE: NORMAL
THC SCREEN, URINE: NORMAL
TRAMADOL SCREEN URINE: NORMAL
TRICYCLIC ANTIDEPRESSANTS, UR: NORMAL

## 2021-05-21 PROCEDURE — 80305 DRUG TEST PRSMV DIR OPT OBS: CPT | Performed by: NURSE PRACTITIONER

## 2021-05-21 RX ORDER — OXYCODONE AND ACETAMINOPHEN 7.5; 325 MG/1; MG/1
1 TABLET ORAL EVERY 8 HOURS PRN
Qty: 21 TABLET | Refills: 0 | Status: SHIPPED | OUTPATIENT
Start: 2021-05-21 | End: 2021-05-28

## 2021-05-21 NOTE — OUTREACH NOTE
Medical Week 1 Survey      Responses   [facility] patient discharged from?  [facility]   Does the patient have one of the following disease processes/diagnoses(primary or secondary)?  Other   Week 1 attempt successful?  Yes   Call start time  1016   Call end time  1020   Discharge diagnosis  **Dialysis AV fistula malfunction, Aspiration into airway resulting to bronchospasm/distress   Meds reviewed with patient/caregiver?  Yes   Is the patient having any side effects they believe may be caused by any medication additions or changes?  No   Does the patient have all medications ordered at discharge?  N/A   Is the patient taking all medications as directed (includes completed medication regime)?  Yes   Does the patient have a primary care provider?   Yes   Does the patient have an appointment with their PCP within 7 days of discharge?  Yes   Has the patient kept scheduled appointments due by today?  N/A   Home health comments  .   What DME was ordered?  Pt has pulse ox and will monitor her sats starting today.   Psychosocial issues?  No   Comments  Pt c/o productive cough, w/white phlegm which is new since DC.   Did the patient receive a copy of their discharge instructions?  Yes   Nursing interventions  Reviewed instructions with patient   What is the patient's perception of their health status since discharge?  Same   Is the patient/caregiver able to teach back signs and symptoms related to disease process for when to call PCP?  Yes   Is the patient/caregiver able to teach back signs and symptoms related to disease process for when to call 911?  Yes   Is the patient/caregiver able to teach back the hierarchy of who to call/visit for symptoms/problems? PCP, Specialist, Home health nurse, Urgent Care, ED, 911  Yes   If the patient is a current smoker, are they able to teach back resources for cessation?  -- [still smoking 1pk/day]   Additional teach back comments  .   Week 1 call completed?  Yes          Delores AKERS  JENNIFFER Farias

## 2021-05-21 NOTE — TELEPHONE ENCOUNTER
Dr. Avis Diallo can you please send this in for this patient? Dr. Beronica Sweet saw her via [address] 5/19/21. Patient fell 4 weeks ago and has a clavicle fracture, she wears a sling. She was sent in 3 days worth of percocet on 4/8/21. Patient still in pain and Dr. Beronica Sweet was agreeable to send this in if she came in and did a UDS and med contract. UDS and med contract done today. ruth done 5/19/21.

## 2021-05-26 NOTE — TELEPHONE ENCOUNTER
Called and spoke with .  He said PT was sick to her stomach.  Feels like she could throw -up.  She isn't running a temp.

## 2021-05-27 RX ORDER — PEN NEEDLE, DIABETIC 31 GX5/16"
NEEDLE, DISPOSABLE MISCELLANEOUS
Qty: 100 EACH | Refills: 3 | Status: SHIPPED | OUTPATIENT
Start: 2021-05-27

## 2021-06-01 PROBLEM — R41.82 ALTERED MENTAL STATUS: Status: ACTIVE | Noted: 2021-01-01

## 2021-06-01 NOTE — ED PROVIDER NOTES
Subjective   Patient is a 58-year-old dialysis patient who went to dialysis center today was somewhat confused but to give her oral glucose which improved her condition after dialysis she was much more confused blood sugar was low subsequent sent to the ED for evaluation in the ER was given an amp of D50 with some improvement but predominant improvement noted with Narcan x2 now she is answering questions.  Was placed on Narcan drip.      Altered Mental Status  Presenting symptoms: behavior changes, confusion, lethargy and partial responsiveness    Severity:  Moderate  Most recent episode:  Today  Episode history:  Single  Timing:  Constant  Progression:  Waxing and waning  Chronicity:  New  Context: drug use    Context: not alcohol use, not dementia, not head injury, not homeless, not nursing home resident, not recent change in medication and not recent illness    Associated symptoms: weakness    Associated symptoms: no abdominal pain, normal movement, no difficulty breathing, no headaches, no slurred speech and no vomiting        Review of Systems   Unable to perform ROS: Mental status change   Gastrointestinal: Negative for abdominal pain and vomiting.   Neurological: Positive for weakness. Negative for headaches.   Psychiatric/Behavioral: Positive for confusion.       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    • Chronic otitis media    • Diabetes (CMS/[facility])    • End stage renal disease on dialysis (CMS/[facility])    • Eustachian tube dysfunction    • GERD (gastroesophageal reflux disease)    • Glaucoma    • HTN (hypertension)    • Hyperlipidemia    • Mucoid otitis media    • Noncompliance of patient with renal dialysis (CMS/[facility])    • Tobacco abuse        Allergies   Allergen Reactions   • Bupropion Nausea Only   • Chantix [Varenicline] Other (See Comments)     Does not remember   • Azithromycin Rash   • Levofloxacin Rash   • Penicillins Rash   • Sulfa Antibiotics Rash        Past Surgical History:   Procedure Laterality Date   • ARTERIOVENOUS FISTULA     • CATARACT EXTRACTION WITH INTRAOCULAR LENS IMPLANT     •  SECTION     • CHOLECYSTECTOMY     • MYRINGOTOMY W/ TUBES Bilateral    • MYRINGOTOMY W/ TUBES Right    • TUBAL ABDOMINAL LIGATION         Family History   Problem Relation Age of Onset   • Heart disease Mother    • Diabetes Father    • Colon cancer Neg Hx    • Colon polyps Neg Hx        Social History     Socioeconomic History   • Marital status: Single     Spouse name: Not on file   • Number of children: Not on file   • Years of education: Not on file   • Highest education level: Not on file   Tobacco Use   • Smoking status: Current Every Day Smoker     Packs/day: 2.00     Years: 6.00     Pack years: 12.00     Types: Cigarettes   • Smokeless tobacco: Never Used   Vaping Use   • Vaping Use: Never used   Substance and Sexual Activity   • Alcohol use: No   • Drug use: No   • Sexual activity: Defer           Objective   Physical Exam  Vitals and nursing note reviewed. Exam conducted with a chaperone present.   Constitutional:       General: She is not in acute distress.     Appearance: She is well-developed and underweight. She is not toxic-appearing or diaphoretic.      Comments: Lethargic difficult to arouse   HENT:      Head: Normocephalic and atraumatic.      Comments: No hemotympanum     Mouth/Throat:      Mouth: Mucous membranes are moist.      Pharynx: Oropharynx is clear.   Eyes:      General: Lids are normal. Lids are everted, no foreign bodies appreciated.      Pupils: Pupils are equal, round, and reactive to light.   Neck:      Thyroid: No thyromegaly.      Vascular: Normal carotid pulses. No carotid bruit or JVD.      Trachea: Trachea and phonation normal. No tracheal tenderness or tracheal deviation.      Meningeal: Brudzinski's sign and Kernig's sign absent.      Comments: No cervical spine tenderness  Cardiovascular:      Rate and Rhythm: Normal rate and  regular rhythm.      Pulses: Normal pulses.      Heart sounds: Normal heart sounds.      Comments: Hypotensive  Pulmonary:      Effort: Pulmonary effort is normal. Bradypnea present. No tachypnea or respiratory distress.      Breath sounds: No stridor. Examination of the right-middle field reveals decreased breath sounds. Examination of the right-lower field reveals decreased breath sounds. Examination of the left-lower field reveals wheezing. Decreased breath sounds and wheezing present.      Comments: Midline trachea  Abdominal:      General: Abdomen is flat. Bowel sounds are normal. There is no distension.      Palpations: Abdomen is soft. There is no mass.      Tenderness: There is no abdominal tenderness. There is no guarding.      Comments: Soft nontender   Musculoskeletal:         General: Normal range of motion.      Cervical back: Full passive range of motion without pain, normal range of motion and neck supple. No rigidity.   Skin:     General: Skin is warm and dry.      Capillary Refill: Capillary refill takes less than 2 seconds.      Coloration: Skin is not pale.      Nails: There is no clubbing.   Neurological:      General: No focal deficit present.      Mental Status: She is lethargic, disoriented and confused.      GCS: GCS eye subscore is 3. GCS verbal subscore is 4. GCS motor subscore is 5.      Cranial Nerves: Cranial nerves are intact. No cranial nerve deficit.      Sensory: Sensation is intact. No sensory deficit.      Motor: Motor function is intact. No abnormal muscle tone.      Deep Tendon Reflexes: Reflexes are normal and symmetric. Reflexes normal. Babinski sign absent on the right side. Babinski sign absent on the left side.      Reflex Scores:       Bicep reflexes are 2+ on the right side and 2+ on the left side.       Patellar reflexes are 2+ on the right side and 2+ on the left side.     Comments: Focal neurological deficits orientation could not be checked the patient is confused    Psychiatric:         Behavior: Behavior is uncooperative.         Central Line At Bedside    Date/Time: 6/1/2021 6:03 PM  Performed by: Maverick Edwards MD  Authorized by: Benitez Farrell MD     Consent:     Consent obtained:  Emergent situation    Consent given by: Patient is confused encephalopathic no family members available require central line for hypertonic saline.    Risks discussed:  Arterial puncture, bleeding, infection, incorrect placement, nerve damage and pneumothorax    Alternatives discussed:  Delayed treatment  Pre-procedure details:     Hand hygiene: Hand hygiene performed prior to insertion      Sterile barrier technique: All elements of maximal sterile technique followed      Skin preparation:  2% chlorhexidine  Anesthesia (see MAR for exact dosages):     Anesthesia method:  Local infiltration    Local anesthetic:  Lidocaine 1% w/o epi  Procedure details:     Location:  R internal jugular    Patient position:  Flat    Procedural supplies:  Triple lumen    Catheter size:  7.5 Fr    Landmarks identified: yes      Ultrasound guidance: yes      Sterile ultrasound techniques: Sterile gel and sterile probe covers were used      Number of attempts:  1    Successful placement: yes    Post-procedure details:     Post-procedure:  Dressing applied    Assessment:  Blood return through all ports, free fluid flow, placement verified by x-ray and no pneumothorax on x-ray    Patient tolerance of procedure:  Tolerated well, no immediate complications               ED Course  ED Course as of Jun 01 1807   Tue Jun 01, 2021   1718 Patient came in with hypoglycemia probably under the influence of narcotics which improved with dextrose and naloxone.  Naloxone drip has been ordered D10 drip has been ordered her blood pressure has been fluctuant from systolic 170 to a systolic of 86.  She is somewhat confused lab work-up is pending on this patient.    [TS]   1719 Large pleural effusion on the right side has been  seen in the past also    [TS]   1720 I think keeping consideration the patient's flexion blood pressure hypoglycemia and her bradypnea this could be related to narcotics waiting on the urine drug screen come back and see any obvious evidence of infection on clinical examination she is not febrile.  Encephalopathic may be.    [TS]   1723 EKG showed normal sinus rhythm incomplete right bundle branch block    [TS]   1733 With altered mental status encephalopathic which could be related to multifactorial etiologies polypharmacy uremia hypoglycemia and sedative use.    [TS]   1745 Patient sodium deficit is 664.2 mEq    [TS]   1745 The lab informed us that the patient's sodium 117 therefore a central line was placed by me the blood will be recollected    [TS]   1806 The lab is not releasing her chemistries therefore we will redraw them and wait for hypertonic saline infusion till the time you get the repeat labs    [TS]   1807 Also holding her Narcan because it is making her agitated.  We can give her  Zyprexa    [TS]      ED Course User Index  [TS] Maverick Edwards MD                                           MDM  Number of Diagnoses or Management Options  Diagnosis management comments: Differential Diagnosis:  I considered toxic-metabolic etiology, hypoglycemia, hyperglycemia, diabetic ketoacidosis, drug overdose, ethanol intoxication, thiamine deficiency, hypothermia, hyponatremia, hypernatremia, organ failure, liver failure, kidney failure, thyroid failure, adrenal failure, hypoxia, hypercarbia, ischemic stroke, intracranial bleed, subarachnoid hemorrhage, closed head injury, subdural hematoma, seizure activity, syncopal episode, infectious etiology, hypertensive encephalopathy, vasculitis, thrombotic thrombocytopenic purpura and disseminated intravascular coagulation as a possible cause of altered mental status in this patient. This is a partial list of diagnoses considered.              Amount and/or Complexity of  Data Reviewed  Clinical lab tests: ordered and reviewed  Tests in the radiology section of CPT®: ordered and reviewed  Tests in the medicine section of CPT®: reviewed and ordered    Risk of Complications, Morbidity, and/or Mortality  Presenting problems: moderate  Diagnostic procedures: moderate  Management options: moderate        Final diagnoses:   Altered mental status, unspecified altered mental status type   Hypoglycemia       ED Disposition  ED Disposition     None          No follow-up provider specified.       Medication List      No changes were made to your prescriptions during this visit.          Maverick Edwards MD  06/01/21 1734       Maverick Edwards MD  06/01/21 1803       Maverick Edwards MD  06/01/21 1804

## 2021-06-02 ENCOUNTER — TELEPHONE (OUTPATIENT)
Dept: PRIMARY CARE CLINIC | Age: 61
End: 2021-06-02

## 2021-06-02 PROBLEM — E46 PROTEIN-CALORIE MALNUTRITION (HCC): Status: ACTIVE | Noted: 2020-01-01

## 2021-06-02 PROBLEM — A41.9 SEPSIS (HCC): Status: ACTIVE | Noted: 2021-01-01

## 2021-06-02 PROBLEM — H21.569 PUPIL IRREGULAR: Status: ACTIVE | Noted: 2018-04-30

## 2021-06-02 PROBLEM — E16.2 HYPOGLYCEMIA: Status: ACTIVE | Noted: 2021-01-01

## 2021-06-02 NOTE — PROGRESS NOTES
"Pharmacy Dosing Service  Automatic Renal Adjustment  Famotidine    Also - updated Vancomycin to 750 mg IV every TTS after HD    Assessment/Action/Plan:  Based on prescribing information provided by the drug , Famotidine 20 mg PO every 12 hours, has been changed to 20 mg PO every 24 hours. Pharmacy will continue to monitor daily and make further adjustment(s) accordingly.     Subjective:  Mini Gentile is a 61 y.o. female     Additional Factors Considered:  • Patient disposition per documentation  • Disease state or condition being treated    Objective:  Ht: 139.7 cm (55\"); Wt: 65.1 kg (143 lb 9.6 oz)  Estimated Creatinine Clearance: 12 mL/min (A) (by C-G formula based on SCr of 3.9 mg/dL (H)).   Creatinine   Date Value Ref Range Status   06/02/2021 3.90 (H) 0.57 - 1.00 mg/dL Final   06/01/2021 3.42 (H) 0.57 - 1.00 mg/dL Final   06/01/2021 3.30 (H) 0.57 - 1.00 mg/dL Final   02/19/2021 7.2 (H) 0.5 - 0.9 mg/dL Final   10/10/2020 7.2 (H) 0.5 - 0.9 mg/dL Final   09/01/2020 5.1 (H) 0.5 - 0.9 mg/dL Final       Frankie Schuster, PharmD  06/02/21 13:14 CDT    "

## 2021-06-02 NOTE — CONSULTS
"Pharmacy Dosing Service  Antimicrobial Dosing  Zosyn & Vancomycin    Assessment/Action/Plan:  Pharmacy to dose Zosyn and Vancomycin requests on dialysis patient for Sepsis. Initiated the following tentative dosing regimens until nephrology determines dialysis to be utilized while hospitalized.   Initiated renally adjusted extended-infusion dosing of, Zosyn 3.375 gm IV over 4 hours every 12 hours, following initial dose given in ER, for patient with CrCl < 20 ml/min. Current end date/final dose: 6/8/21 at 2100.  Initiated Vancomycin 750 mg IV every 48 hours, following a 1,250 mg dose given in ER. No levels ordered at this time. Current end date/final dose: 6/9/21 at 2100.  Pharmacy will continue to monitor daily and make further adjustment(s) accordingly.     Subjective:  Mini Gentile is a 61 y.o. female with a  \"Pharmacy to Dose Zosyn and Vancomycin\" consult for the treatment of Sepsis , day 2 of treatment.    Objective:  Ht: 139.7 cm (55\"); Wt: 65.1 kg (143 lb 9.6 oz)  Estimated Creatinine Clearance: 13.7 mL/min (A) (by C-G formula based on SCr of 3.42 mg/dL (H)).   Creatinine   Date Value Ref Range Status   06/01/2021 3.42 (H) 0.57 - 1.00 mg/dL Final   06/01/2021 3.30 (H) 0.57 - 1.00 mg/dL Final      Lab Results   Component Value Date    WBC 3.43 06/01/2021    WBC  06/01/2021      Comment:      Corrected results  Corrected result. Previous result was 2.70 10*3/mm3 on 6/1/2021 at 1735 CDT.      Baseline culture results:  Microbiology Results (last 10 days)       Procedure Component Value - Date/Time    COVID PRE-OP / PRE-PROCEDURE SCREENING ORDER (NO ISOLATION) - Swab, Nasal Cavity [[id number]]  (Normal) Collected: 05/24/21 1427    Lab Status: Final result Specimen: Swab from Nasal Cavity Updated: 05/24/21 2038    Narrative:      The following orders were created for panel order COVID PRE-OP / PRE-PROCEDURE SCREENING ORDER (NO ISOLATION) - Swab, Nasal Cavity.  Procedure                               " Abnormality         Status                     ---------                               -----------         ------                     COVID-19,APTIMA PANTHER,...[[id number]]  Normal              Final result                 Please view results for these tests on the individual orders.    COVID-19,APTIMA PANTHER,PAD IN-HOUSE,NP/OP/NASAL SWAB IN UTM/VTM/SALINE/LIQUID AMIES TRANSPORT MEDIA/NP WASH OR ASPIRATE, 24 HR TAT - Swab, Nasal Cavity [[id number]]  (Normal) Collected: 05/24/21 1427    Lab Status: Final result Specimen: Swab from Nasal Cavity Updated: 05/24/21 2038     COVID19 Not Detected    Narrative:      Fact sheet for providers: https://www.fda.gov/media/684501/download     Fact sheet for patients: https://www.fda.gov/media/846900/download    Test performed by RT PCR.            Vimal Jewell, LaneyD  06/02/21 03:41 CDT

## 2021-06-02 NOTE — PLAN OF CARE
Problem: Restraint, Nonbehavioral (Nonviolent)  Goal: Discontinuation Criteria Achieved  Outcome: Ongoing, Progressing  Intervention: Implement Least-restrictive Safety Strategies  Recent Flowsheet Documentation  Taken 6/2/2021 0200 by Rosangela Gonsalves RN  Medical Device Protection: IV pole/bag removed from visual field  Taken 6/2/2021 0000 by Rosangela Gonsalves RN  Medical Device Protection: IV pole/bag removed from visual field  Taken 6/1/2021 2300 by Rosangela Gonsalves RN  Medical Device Protection: IV pole/bag removed from visual field  Taken 6/1/2021 2230 by Rosangela Gonsalves RN  Medical Device Protection: IV pole/bag removed from visual field  Goal: Personal Dignity and Safety Maintained  Outcome: Ongoing, Progressing  Intervention: Protect Skin and Joint Integrity  Recent Flowsheet Documentation  Taken 6/2/2021 0200 by Rosangela Gonsalves RN  Body Position:   turned   tilted, right  Taken 6/2/2021 0000 by Rosangela Gonsalves RN  Body Position: position maintained  Taken 6/1/2021 2230 by Rosangela Gonsalves RN  Body Position: supine  Range of Motion: active ROM (range of motion) encouraged     Problem: Fall Injury Risk  Goal: Absence of Fall and Fall-Related Injury  Outcome: Ongoing, Progressing  Intervention: Identify and Manage Contributors to Fall Injury Risk  Recent Flowsheet Documentation  Taken 6/1/2021 2230 by Rosangela Gonsalves RN  Medication Review/Management: medications reviewed  Intervention: Promote Injury-Free Environment  Recent Flowsheet Documentation  Taken 6/2/2021 0200 by Rosangela Gonsalves RN  Safety Promotion/Fall Prevention:   safety round/check completed   fall prevention program maintained  Taken 6/2/2021 0000 by Rosangela Gonsalves RN  Safety Promotion/Fall Prevention:   fall prevention program maintained   safety round/check completed  Taken 6/1/2021 2230 by Rosangela Gonsalves RN  Safety Promotion/Fall Prevention:   safety round/check completed   fall prevention program maintained     Problem:  Skin Injury Risk Increased  Goal: Skin Health and Integrity  Outcome: Ongoing, Progressing  Intervention: Optimize Skin Protection  Recent Flowsheet Documentation  Taken 6/2/2021 0200 by Rosangela Gonsalves RN  Head of Bed (HOB): HOB at 30 degrees  Taken 6/2/2021 0000 by Rosangela Gonsalves RN  Head of Bed (HOB): HOB at 30 degrees  Taken 6/1/2021 2230 by Rosangela Gonsalves RN  Head of Bed (HOB): HOB at 30 degrees     Problem: Adult Inpatient Plan of Care  Goal: Plan of Care Review  Outcome: Ongoing, Progressing  Flowsheets (Taken 6/2/2021 0300)  Progress: no change  Plan of Care Reviewed With: patient  Outcome Summary: Patient arrived to unit at 2225.  D10 infusing into central line.  Patient responds to name, able to state place and name.  Confused to time and situation.  Dr. Kent at bedside.  Patient able to follow commands.  Ng catheter in place w/ little UOP.  Dressing change performed to central line.  BSWR in place, when undone for repositioning patient repeatedly attempts to pull out ng catheter and central line. Periodic glucose checks performed d/t hypoglycemic episodes.   Fall and safety precautions implemented and intact.  Goal: Patient-Specific Goal (Individualized)  Outcome: Ongoing, Progressing  Goal: Absence of Hospital-Acquired Illness or Injury  Outcome: Ongoing, Progressing  Intervention: Identify and Manage Fall Risk  Recent Flowsheet Documentation  Taken 6/2/2021 0200 by Rosangela Gonsalves RN  Safety Promotion/Fall Prevention:   safety round/check completed   fall prevention program maintained  Taken 6/2/2021 0000 by Rosangela Gonsalves RN  Safety Promotion/Fall Prevention:   fall prevention program maintained   safety round/check completed  Taken 6/1/2021 2230 by Rosangela Gonsalves RN  Safety Promotion/Fall Prevention:   safety round/check completed   fall prevention program maintained  Intervention: Prevent Skin Injury  Recent Flowsheet Documentation  Taken 6/2/2021 0200 by Rosangela Gonsalves  RN  Body Position:   turned   tilted, right  Taken 6/2/2021 0000 by Rosangela Gonsalves RN  Body Position: position maintained  Taken 6/1/2021 2230 by Rosangela Gonsalves RN  Body Position: supine  Intervention: Prevent and Manage VTE (venous thromboembolism) Risk  Recent Flowsheet Documentation  Taken 6/1/2021 2230 by Rosangela Gonsalves RN  VTE Prevention/Management: dorsiflexion/plantar flexion performed  Intervention: Prevent Infection  Recent Flowsheet Documentation  Taken 6/2/2021 0200 by Rosangela Gonsalves RN  Infection Prevention:   environmental surveillance performed   rest/sleep promoted  Taken 6/2/2021 0000 by Rosangela Gonaslves RN  Infection Prevention: rest/sleep promoted  Goal: Optimal Comfort and Wellbeing  Outcome: Ongoing, Progressing  Goal: Readiness for Transition of Care  Outcome: Ongoing, Progressing     Problem: Adjustment to Illness (Chronic Kidney Disease)  Goal: Optimal Coping with Chronic Illness  Outcome: Ongoing, Progressing     Problem: Electrolyte Imbalance (Chronic Kidney Disease)  Goal: Electrolyte Balance  Outcome: Ongoing, Progressing     Problem: Fluid Volume Excess (Chronic Kidney Disease)  Goal: Fluid Balance  Outcome: Ongoing, Progressing     Problem: Functional Decline (Chronic Kidney Disease)  Goal: Optimal Functional Ability  Outcome: Ongoing, Progressing  Intervention: Optimize Functional Ability  Recent Flowsheet Documentation  Taken 6/1/2021 2230 by Rosangela Gonsalves RN  Activity Management: bedrest     Problem: Hematologic Alteration (Chronic Kidney Disease)  Goal: Absence of Anemia Signs and Symptoms  Outcome: Ongoing, Progressing     Problem: Oral Intake Inadequate (Chronic Kidney Disease)  Goal: Optimal Oral Intake  Outcome: Ongoing, Progressing     Problem: Renal Function Impairment (Chronic Kidney Disease)  Goal: Laboratory Values and Blood Pressure Within Desired Range  Outcome: Ongoing, Progressing  Intervention: Monitor and Support Renal Function  Recent Flowsheet  Documentation  Taken 6/1/2021 2230 by Rosangela Gonsalves RN  Medication Review/Management: medications reviewed     Problem: Fluid Imbalance (Pneumonia)  Goal: Fluid Balance  Outcome: Ongoing, Progressing     Problem: Infection (Pneumonia)  Goal: Resolution of Infection Signs and Symptoms  Outcome: Ongoing, Progressing     Problem: Respiratory Compromise (Pneumonia)  Goal: Effective Oxygenation and Ventilation  Outcome: Ongoing, Progressing  Intervention: Promote Airway Secretion Clearance  Recent Flowsheet Documentation  Taken 6/1/2021 2230 by Rosangela Gonsalves RN  Cough And Deep Breathing: done independently per patient  Intervention: Optimize Oxygenation and Ventilation  Recent Flowsheet Documentation  Taken 6/2/2021 0200 by Rosangela Gonsalves RN  Head of Bed (HOB): HOB at 30 degrees  Taken 6/2/2021 0000 by Rosangela Gonsalves RN  Head of Bed (HOB): HOB at 30 degrees  Taken 6/1/2021 2230 by Rosangela Gonsalves RN  Head of Bed (HOB): HOB at 30 degrees   Goal Outcome Evaluation:  Plan of Care Reviewed With: patient  Progress: no change  Outcome Summary: Patient arrived to unit at 2225.  D10 infusing into central line.  Patient responds to name, able to state place and name.  Confused to time and situation.  Dr. Kent at bedside.  Patient able to follow commands.  Ng catheter in place w/ little UOP.  Dressing change performed to central line.  BSWR in place, when undone for repositioning patient repeatedly attempts to pull out ng catheter and central line. Periodic glucose checks performed d/t hypoglycemic episodes.   Fall and safety precautions implemented and intact.

## 2021-06-02 NOTE — PAYOR COMM NOTE
"ADMITTED 6/1/2021    [facility]  ELSI,   [phone]  OR  FAX  [phone]    Mini Peña (61 y.o. Female)     Date of Birth Social Security Number Address Home Phone MRN    1960  [address] [place] [id number] [phone] [id number]    Congregational Marital Status          Other Single       Admission Date Admission Type Admitting Provider Attending Provider Department, Room/Bed    6/1/21 Emergency Bin Guy, Bin Valiente,  [facility] INTENSIVE CARE, I008/1    Discharge Date Discharge Disposition Discharge Destination                       Attending Provider: Bin Guy DO    Allergies: Bupropion, Chantix [Varenicline], Gabapentin, Azithromycin, Levofloxacin, Penicillins, Sulfa Antibiotics    Isolation: None   Infection: None   Code Status: CPR    Ht: 139.7 cm (55\")   Wt: 65.1 kg (143 lb 9.6 oz)    Admission Cmt: None   Principal Problem: Altered mental status [R41.82]                 Active Insurance as of 6/1/2021     Primary Coverage     Payor Plan Insurance Group Employer/Plan Group    WELLCARE [facility] MEDICARE REPLACEMENT WELLCARE MEDICARE REPLACEMENT      Payor Plan Address Payor Plan Phone Number Payor Plan Fax Number Effective Dates    [address] [address] [phone]  4/1/2021 - None Entered    [facility] [id number]       Subscriber Name Subscriber Birth Date Member ID       MINI PEÑA 1960 [id number]           Secondary Coverage     Payor Plan Insurance Group Employer/Plan Group    WELLCARE [facility] WELLCARE MEDICAID      Payor Plan Address Payor Plan Phone Number Payor Plan Fax Number Effective Dates    [address] [address] [phone]  11/4/2016 - None Entered    [facility] [id number]       Subscriber Name Subscriber Birth Date Member ID       LUPILLOHIAMANDAMINI 1960 [id number]                 Emergency Contacts      (Rel.) Home Phone Work Phone Mobile Phone    Erica Bowling (Other) [phone] -- [phone]    " Jose Bowling (Significant Other) [phone] -- [phone]        Patient Care Timeline (6/1/2021 16:26 to 6/1/2021 22:30)     6/1/2021 Event Details User   16:26 Patient arrival   Antionette Velazquez RN   16:26:55 Arrival Complaint HYPOGLYCEMIA      16:27 Acuity/Destination Acuity/Destination  Patient Acuity: 2  ED Destination: ED Beds Antionette Velazquez RN      16:30 HPI HPI (Adult)  Stated Reason for Visit: EMS called to [facility] after finishing dialysis. Nurse states that she is more altered than usual and more altered than when she first arrived.  Derrick Reynaga RN   16:33 Vital Signs Vital Signs  Temp src: Oral  Heart Rate: 78  Heart Rate Source: Monitor  Resp: 10  Resp Rate Source: Monitor  Oxygen Therapy  SpO2: 96 %  Device (Oxygen Therapy): room air Antionette Velazquez RN      16:43 Medication Given naloxone (NARCAN) injection 0.4 mg - Dose: 0.4 mg ; Route: Intravenous ; Line: Peripheral IV 06/01/21 1635 Right Wrist ; Scheduled Time: 1644 Derrick Reynaga RN      16:46 Medication Given naloxone (NARCAN) injection 0.4 mg - Dose: 0.4 mg ; Route: Intravenous ; Line: Peripheral IV 06/01/21 1635 Right Wrist ; Scheduled Time: 1647 Derrick Reynaga RN   16:46:17 Orders Acknowledged New  - naloxone (NARCAN) injection 0.4 mg Derrick Reynaga RN      16:48:34 Hypoglycemia Assessments (Adult) Respiratory WDL  Respiratory WDL: WDL except; all  Rhythm/Pattern, Respiratory: shortness of breath  Cardiac WDL  Cardiac WDL: WDL  Peripheral/Neurovascular WDL  Peripheral Neurovascular WDL: WDL  Cognitive/Neuro/Behavioral WDL  Level of Consciousness: Alert  Cognitive/Neuro/Behavioral WDL: WDL except; all  Arousal Level: opens eyes spontaneously  Orientation: disoriented to; situation; time  Speech: slurred  Gastrointestinal WDL  Gastrointestinal WDL: WDL  Genitourinary WDL  Genitourinary WDL: WDL except; all (dialysis pt)  Skin WDL  Skin WDL: WDL  Safety WDL  Safety WDL: WDL Derrick Reynaga RN      17:18 Free Text Patient came in  with hypoglycemia probably under the influence of narcotics which improved with dextrose and naloxone.  Naloxone drip has been ordered D10 drip has been ordered her blood pressure has been fluctuant from systolic 170 to a systolic of 86.  She is somewhat confused lab work-up is pending on this patient. Maverick Edwards MD      17:20 Free Text I think keeping consideration the patient's flexion blood pressure hypoglycemia and her bradypnea this could be related to narcotics waiting on the urine drug screen come back and see any obvious evidence of infection on clinical examination she is not febrile.  Encephalopathic may be. Maverick Edwards MD      17:25 Medication New Bag naloxone (NARCAN) 2 mg in sodium chloride 0.9 % 495 mL infusion - Dose: 1 mg/hr ; Rate: 250 mL/hr ; Route: Intravenous ; Line: Peripheral IV 06/01/21 1635 Right Wrist ; Scheduled Time: 1651 Derrick Reynaga RN      17:33 Free Text With altered mental status encephalopathic which could be related to multifactorial etiologies polypharmacy uremia hypoglycemia and sedative use. Maverick Edwards MD      17:45 Free Text The lab informed us that the patient's sodium 117 therefore a central line was placed by me the blood will be recollected Maverick Edwards MD      18:12 Medication Given OLANZapine (zyPREXA) injection 10 mg - Dose: 10 mg ; Route: Intramuscular ; Site: Left Ventrogluteal ; Scheduled Time: 1809 Derrick Reynaga RN         18:23:13 Orders Acknowledged New  - Restraints non-violent or non-self destructive ; CBC & Differential Reynaga, Derrick D, RN   18:26 Non-Violent Restraints Non-Violent Restraint Order Information  Order Received or Renewed: Yes  Length of Order: Calendar Day  Justification  Clinical Justification: Pulling lines; Pulling tubes; Removal of equipment  Restraint Monitoring Every 2 Hours- add the appropriate CPG to the care plan  Visual Check: Agitated/restless; Delusional  Circulation: No signs of injury  Range of Motion:  Performed  Fluids: IV fluids  Food/Meal: NPO  Elimination: Offered  Physical Comfort: Reposition  Restraint Type (NV)  Soft Restraint R Wrist (NV): Start  Soft Restraint L Wrist (NV): Start  Restraint Order  Length of Order: Calendar Day  Order Received or Renewed: Yes  Face to Face: Yes  Assess Non-Violent Restraints  Restraints Monitored: Yes Derrick Reynaga RN   18:26 Restraint Summary Other flowsheet entries  NB Restraint Status: START (Preferred Group ID: [id number]) Derrick Reynaga RN      18:38 Medication Given haloperidol lactate (HALDOL) injection 2 mg - Dose: 2 mg ; Route: Intravenous ; Line: Peripheral IV 06/01/21 1635 Right Wrist ; Scheduled Time: 1836 Derrick Reynaga RN         20:00 Neuro Cognitive (Adult) Neuro Cognitive (Adult)  Cognitive/Neuro/Behavioral WDL: WDL except; all  Level of Consciousness: Confusion  Arousal Level: opens eyes spontaneously  Orientation: disoriented to; person; place; time; situation  Speech: illogical; slurred  Mood/Behavior: agitated; combative; anxious  Additional Documentation: [place] Coma Scale (Group)  [place] Coma Scale  Best Eye Response: 4-->(E4) spontaneous  Best Motor Response: 6-->(M6) obeys commands  Best Verbal Response: 4-->(V4) confused  [place] Coma Scale Score: 14 Derrick Reynaga RN      21:06 Medication New Bag piperacillin-tazobactam (ZOSYN) 3.375 g/100 mL 0.9% NS IVPB (mbp) - Dose: 3.375 g ; Route: Intravenous ; Line: CVC Triple Lumen 06/01/21 Right Internal jugular (Medial) ; Scheduled Time: 2045 Derrick Reynaga RN   21:07 Medication Given furosemide (LASIX) injection 80 mg - Dose: 80 mg ; Route: Intravenous ; Line: Peripheral IV 06/01/21 Right Antecubital ; Scheduled Time: 2043 Derrick Reynaga RN   21:11 Medication New Bag vancomycin 1250 mg/250 mL 0.9% NS IVPB ([facility]) - Dose: 1,250 mg ; Route: Intravenous ; Line: CVC Triple Lumen 06/01/21 Right Internal jugular (Distal) ; Scheduled Time: 2045 Derrick Reynaga RN   21:12 Urethral Catheter 16 Fr. Placed  Placement Date/Time: 06/01/21 2112   Indications: Acute Urinary Retention  Inserted by: JENNIFFER Reynaga  Hand Hygiene Completed: Yes  Tube Size (Fr.): 16 Fr.  Catheter Balloon Size: 10 mL  Urine Returned: Yes Derrick Reynaga RN         22:30 Critical Care Adult PCS Body System Pain/Comfort/Sleep  Preferred Pain Scale: CPOT (Critical-Care Pain Observation Tool)  CPOT Facial Expression: 0-->relaxed, neutral  CPOT Body Movements: 1-->protection  CPOT Muscle Tension: 0-->relaxed  Ventilator Compliance/Vocalization: 0-->talking in normal tone or no sound  CPOT Score: 1  POSS ([place] Opioid-Induced Sed Scale): 3 - Frequently drowsy, arousable, drifts off to sleep during conversation  Sleep/Rest/Relaxation: no problem identified; sleeping between care; appears asleep  Coping/Psychosocial  Observed Emotional State: restless (intermittent)  HEENT  HEENT WDL: WDL except; mucosa  Oral Mucosa Symptoms: dry  Mouth/Teeth WDL  Mouth/Teeth WDL: WDL except; teeth  Teeth Symptoms: dental appliance present  Cognitive  Cognitive/Neuro/Behavioral WDL: WDL except; all  Level of Consciousness: Confusion  Arousal Level: arouses to touch/gentle shaking; arouses to voice  Orientation: disoriented to; time; situation  Speech: illogical; slurred  Mood/Behavior: hypoactive (quiet, withdrawn); restless; labile  Pupils  Pupil PERRLA: no  Pupil Size Left: 4 mm  Pupil Shape Left: irregular  Pupil Reaction Left: fixed  Pupil Size Right: 3 mm  Pupil Shape Right: round  Pupil Reaction Right: sluggish  [place] Coma Scale  Best Eye Response: 3-->(E3) to speech  Best Motor Response: 6-->(M6) obeys commands  Best Verbal Response: 4-->(V4) confused  [place] Coma Scale Score: 13  Motor Response  Motor Response: general motor response  General Motor Response: purposeful motor response; purposeful/movement localizing  Hand /Ankle Strength  Hand , Left: strong  Hand , Right: strong  Dorsiflexion, Left: moderate  Dorsiflexion, Right:  moderate  Plantarflexion, Left: moderate  Plantarflexion, Right: moderate  Intellectual Performance WDL  Level of Consciousness: Confusion  Respiratory  Respiratory WDL: WDL except; breath sounds; cough  Rhythm/Pattern, Respiratory: depth regular; unlabored  Cough Frequency: infrequent  Cough Type: congested; dry  Cough And Deep Breathing: done independently per patient  Breath Sounds  Breath Sounds: All Fields; RUL; RML; RLL  All Lung Fields Breath Sounds: Anterior:; diminished; wheezes, expiratory  RUL Breath Sounds: crackles, fine  RML Breath Sounds: crackles, fine  RLL Breath Sounds: crackles, fine  Cardiac  Cardiac WDL: WDL  ECG  Lead Monitored: Lead II  Rhythm: normal sinus rhythm  Peripheral Neurovascular  Peripheral Neurovascular WDL: WDL except; edema; pulse assessment  Pulse Assessment: dorsalis pedis; radial  VTE Prevention/Management: dorsiflexion/plantar flexion performed  Pulse Radial  Left Radial Pulse: 2+ (normal); palpation  Right Radial Pulse: 2+ (normal); palpation  Pulse Dorsalis Pedis  Left Dorsalis Pedis Pulse: 2+ (normal); palpation  Right Dorsalis Pedis Pulse: 2+ (normal); palpation  Edema  Edema: leg, left; leg, right; hip, left; hip, right  Hip, Left Edema: 3+ (Moderate)  Hip, Right Edema: 3+ (Moderate)  Leg, Left Edema: 3+ (Moderate)  Leg, Right Edema: 3+ (Moderate)  Gastrointestinal  Gastrointestinal WDL: WDL  Genitourinary  Genitourinary WDL: WDL except; urine; voiding ability/characteristics  Voiding Characteristics: oliguria; urethral catheter (bladder)  Urine Characteristics: luis alfredo  Skin  Skin WDL: WDL  Rogelio Risk Assessment  Sensory Perception: 3-->slightly limited  Moisture: 4-->rarely moist  Activity: 1-->bedfast  Mobility: 2-->very limited  Nutrition: 3-->adequate  Friction and Shear: 2-->potential problem  Rogelio Score: 15  Musculoskeletal  Musculoskeletal WDL: WDL except; all  General Mobility: no overt deficits noted  Range of Motion: active ROM (range of motion) encouraged              History & Physical      Tejinder Knet MD at 06/01/21 8881          .      [facility] Medicine Services  HISTORY AND PHYSICAL    Date of Admission: 6/1/2021  Primary Care Physician: Bharati Dahl APRN    Subjective     Chief Complaint: Confusion    History of Present Illness  Patient is a 58-year-old white female history of end-stage renal disease on Tuesday Thursday Saturday dialysis for which she is often noncompliant.  She was at dialysis today when she started becoming somewhat confused the group there thought she was hypoglycemic and gave her glucose which improved some but she continued to have issues and was sent to the emergency room for further evaluation.  In the ER she continued to be hypoglycemic despite D50 and confused.  She was given more boluses of D50 and finally placed on a D10 drip as well as given Narcan which did show a little bit of improvement but not enough to correct her situation.  She was ultimately placed on a Narcan drip and a central line was placed.  Her urine drug screen was only positive for oxycodone which she does have a prescription for as well as for Lyrica these.  The only medicines she is on that can cause confusion at least on her med list.  CT scans of her head abdomen and pelvis as well as chest are unremarkable with the exception of chest CT which shows aspiration in the right lower and middle lobes versus mucous plugging.  They also show evidence of volume overload and anasarca.  Her initial labs were all hemolyzed apparently so they had to be repeated show a creatinine of 3.3 which would expect after dialysis electrolytes were unremarkable white count is low at 3.43 with a left shift previous white count was 6.  She is afebrile and apparently her blood pressure was somewhat labile in the emergency room it was initially thought her sodium was 117 but it turned out to be 133.  Uncertain if this is medication effect or uremia  less likely since she just dialyzed or infectious process.  Unable to get any history out of the patient she will be then to the ICU for closer monitoring we will culture blood and sputum as well empiric antibiotics for now until he can better get a story from her.        Review of Systems   Unable to obtain patient is encephalopathic  Otherwise complete ROS reviewed and negative except as mentioned in the HPI.    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)    • Eustachian tube dysfunction    • GERD (gastroesophageal reflux disease)    • Glaucoma    • HTN (hypertension)    • Hyperlipidemia    • Mucoid otitis media    • Noncompliance of patient with renal dialysis (CMS/LTAC, located within [facility])    • Tobacco abuse      Past Surgical History:  Past Surgical History:   Procedure Laterality Date   • ARTERIOVENOUS FISTULA     • CATARACT EXTRACTION WITH INTRAOCULAR LENS IMPLANT     •  SECTION     • CHOLECYSTECTOMY     • MYRINGOTOMY W/ TUBES Bilateral    • MYRINGOTOMY W/ TUBES Right    • TUBAL ABDOMINAL LIGATION       Social History:  reports that she has been smoking cigarettes. She has a 12.00 pack-year smoking history. She has never used smokeless tobacco. She reports that she does not drink alcohol and does not use drugs.    Family History: family history includes Diabetes in her father; Heart disease in her mother.       Allergies:  Allergies   Allergen Reactions   • Bupropion Nausea Only   • Chantix [Varenicline] Other (See Comments)     Does not remember   • Gabapentin Hallucinations     hallucinations   • Azithromycin Rash   • Levofloxacin Rash   • Penicillins Rash   • Sulfa Antibiotics Rash       Medications:  Prior to Admission medications    Medication Sig Start Date End Date Taking? Authorizing Provider   Ferric Citrate 1  MG(Fe) tablet Take  by  mouth.   Yes Steve Warren MD   metoclopramide (REGLAN) 5 MG tablet Take 5 mg by mouth 4 (Four) Times a Day Before Meals & at Bedtime.   Yes Steve Warren MD   midodrine (PROAMATINE) 5 MG tablet Take 5 mg by mouth 3 (Three) Times a Day Before Meals.   Yes Steve Warren MD   traMADol (ULTRAM) 50 MG tablet Take 50 mg by mouth Every 6 (Six) Hours As Needed for Moderate Pain .   Yes Steve Warren MD   Travoprost (TRAVATAN OP) Apply  to eye(s) as directed by provider.   Yes Steve Warren MD   albuterol sulfate  (90 Base) MCG/ACT inhaler Inhale 2 puffs Every 4 (Four) Hours As Needed for Wheezing. 1/21/20   Sundeep Aldridge MD   aspirin 81 MG EC tablet Take 81 mg by mouth Daily.    Steve Warren MD   atropine 1 % ophthalmic solution Administer 1 drop into the left eye Daily.    Steve Warren MD   brimonidine (ALPHAGAN) 0.2 % ophthalmic solution Administer 1 drop into the left eye 3 (Three) Times a Day.    Steve Warren MD   carvedilol (COREG) 6.25 MG tablet Take 6.25 mg by mouth 2 (Two) Times a Day With Meals.    Steve Warren MD   cholecalciferol (VITAMIN D3) 1000 units tablet Take 2,000 Units by mouth Daily.    Steve Warren MD   dorzolamide (TRUSOPT) 2 % ophthalmic solution Administer 1 drop into the left eye 2 (Two) Times a Day.    Steve Warren MD   famotidine (PEPCID) 20 MG tablet Take 20 mg by mouth 2 (Two) Times a Day.    Steve Warren MD   fluticasone (FLONASE) 50 MCG/ACT nasal spray 2 sprays into the nostril(s) as directed by provider 2 (Two) Times a Day.    Steve Warren MD   insulin glargine (LANTUS) 100 UNIT/ML injection Inject 10 Units under the skin Every Night.    Steve Warren MD   ipratropium-albuterol (DUO-NEB) 0.5-2.5 mg/3 ml nebulizer Take 3 mL by nebulization Every 4 (Four) Hours As Needed for Wheezing or Shortness of Air.    Provider, Historical, MD   lactobacillus  "acidophilus (RISAQUAD) capsule capsule Take 1 capsule by mouth Daily. 5/3/20   Lyndon Lockett MD   levothyroxine (SYNTHROID, LEVOTHROID) 25 MCG tablet Take 1 tablet by mouth Daily. 3/28/21   Roxie Schneider APRN   losartan (COZAAR) 50 MG tablet Take 50 mg by mouth 2 (Two) Times a Day.    Steve Warren MD   ondansetron (ZOFRAN) 4 MG tablet Take 1 tablet by mouth Every 6 (Six) Hours As Needed for Nausea or Vomiting. 4/29/20   Lyndon Lockett MD   pantoprazole (PROTONIX) 40 MG EC tablet Take 1 tablet by mouth Daily. 4/8/21   Fuentes Cartagena DO   pravastatin (PRAVACHOL) 10 MG tablet Take 10 mg by mouth Every Night.    Steve Warren MD   pregabalin (LYRICA) 50 MG capsule Take 50 mg by mouth 2 (Two) Times a Day.    Steve Warren MD   sertraline (ZOLOFT) 25 MG tablet Take 25 mg by mouth Daily.    Steve Warren MD   sevelamer (RENVELA) 800 MG tablet Take 2,400 mg by mouth 3 (Three) Times a Day With Meals.    Steve Warren MD   timolol (TIMOPTIC) 0.5 % ophthalmic solution Administer 1 drop into the left eye Every Morning.    Steve Warren MD I have utilized all available immediate resources to obtain, update, and review the patient's current medications.    Objective     Vital Signs: BP (!) 142/108   Pulse 80   Temp 96.9 °F (36.1 °C) (Axillary)   Resp 13   Ht 139.7 cm (55\")   Wt 65.1 kg (143 lb 9.6 oz)   SpO2 96%   BMI 33.38 kg/m²   Physical Exam   Gen.:  Well-nourished well-developed white female agitated at times  HEENT: Atraumatic, normocephalic.  Pupils equal, round, and reactive to light. Extraocular movements intact.  Sclerae anicteric.  External ears negative.  Mucous membranes moist.  Neck is supple without lymphadenopathy.  No JVD is noted.  No carotid bruits are auscultated.  Oropharynx is without erythema or exudate.   Chest: Coarse breath sounds deloris.  CV: Regular rate and rhythm.  Normal S1-S2.  No gallops, murmurs. or rubs.  Abdomen: Soft, " nontender, nondistended.  Active bowel sounds,  No hepatosplenomegaly.  No masses.  No hernias.  Extremities: No clubbing, edema, or cyanosis.  Capillary refill is normal.  Pulses are 2+ and symmetric at radial and dorsalis pedis.  Posterior tibial pulses are intact and 2+ palpable.  Neuro: Patient is awake, alert, and oriented ×0.  Cranial nerves II through XII are grossly intact.  Motor is 5 out of 5 bilaterally.  DTRs are 2+ and symmetric bilaterally. Sensory exam is nonfocal  Skin: Warm, dry, and intact.  No evidence of breakdown.  Sensorium: Cephalopathic    Nursing notes and vital signs reviewed        Results Reviewed:  Lab Results (last 24 hours)     Procedure Component Value Units Date/Time    POC Glucose Once [[id number]]  (Abnormal) Collected: 06/02/21 0407    Specimen: Blood Updated: 06/02/21 0418     Glucose 198 mg/dL      Comment: : RONNA HoldenaMeter ID: [id number]       Comprehensive Metabolic Panel [[id number]]  (Abnormal) Collected: 06/02/21 0318    Specimen: Blood Updated: 06/02/21 0404     Glucose 200 mg/dL      BUN 25 mg/dL      Creatinine 3.90 mg/dL      Sodium 131 mmol/L      Potassium 3.6 mmol/L      Chloride 92 mmol/L      CO2 29.0 mmol/L      Calcium 8.3 mg/dL      Total Protein 5.2 g/dL      Albumin 2.90 g/dL      ALT (SGPT) 14 U/L      AST (SGOT) 17 U/L      Alkaline Phosphatase 206 U/L      Total Bilirubin 0.3 mg/dL      eGFR Non African Amer 12 mL/min/1.73      Comment: <15 Indicative of kidney failure.        eGFR   Amer --     Comment: <15 Indicative of kidney failure.        Globulin 2.3 gm/dL      A/G Ratio 1.3 g/dL      BUN/Creatinine Ratio 6.4     Anion Gap 10.0 mmol/L     Narrative:      GFR Normal >60  Chronic Kidney Disease <60  Kidney Failure <15      Phosphorus [[id number]]  (Abnormal) Collected: 06/02/21 0318    Specimen: Blood Updated: 06/02/21 0404     Phosphorus 4.8 mg/dL     Magnesium [[id number]]  (Normal) Collected: 06/02/21 0318    Specimen: Blood  Updated: 06/02/21 0404     Magnesium 1.8 mg/dL     CBC Auto Differential [[id number]]  (Abnormal) Collected: 06/02/21 0318    Specimen: Blood Updated: 06/02/21 0358     WBC 2.93 10*3/mm3      RBC 2.90 10*6/mm3      Hemoglobin 9.1 g/dL      Hematocrit 28.3 %      MCV 97.6 fL      MCH 31.4 pg      MCHC 32.2 g/dL      RDW 17.5 %      RDW-SD 62.0 fl      MPV 9.1 fL      Platelets 175 10*3/mm3      Neutrophil % 74.1 %      Lymphocyte % 10.9 %      Monocyte % 12.3 %      Eosinophil % 1.7 %      Basophil % 0.3 %      Neutrophils, Absolute 2.17 10*3/mm3      Lymphocytes, Absolute 0.32 10*3/mm3      Monocytes, Absolute 0.36 10*3/mm3      Eosinophils, Absolute 0.05 10*3/mm3      Basophils, Absolute 0.01 10*3/mm3     Hemoglobin A1c [[id number]]  (Abnormal) Collected: 06/01/21 1759    Specimen: Blood Updated: 06/02/21 0331     Hemoglobin A1C 5.70 %     Narrative:      Hemoglobin A1C Ranges:    Increased Risk for Diabetes  5.7% to 6.4%  Diabetes                     >= 6.5%  Diabetic Goal                < 7.0%    POC Glucose Once [[id number]]  (Abnormal) Collected: 06/02/21 0218    Specimen: Blood Updated: 06/02/21 0229     Glucose 194 mg/dL      Comment: : RONNA Smith ID: [id number]       POC Glucose Once [[id number]]  (Abnormal) Collected: 06/01/21 2247    Specimen: Blood Updated: 06/01/21 2258     Glucose 179 mg/dL      Comment: : RONNA Smith ID: [id number]       POC Glucose Once [[id number]]  (Abnormal) Collected: 06/01/21 2135    Specimen: Blood Updated: 06/01/21 2146     Glucose 158 mg/dL      Comment: : [id number] Reynaga justinMeter ID: [id number]       ] Collected: 06/01/21 1642     Updated: 06/01/21 2045            Gray Top [[id number]] Collected: 06/01/21 1642    Specimen: Blood Updated: 06/01/21 2045     Extra Tube Hold for add-ons.     Comment: Auto resulted.       Procalcitonin [[id number]]  (Abnormal) Collected: 06/01/21 1759    Specimen: Blood Updated: 06/01/21 1948      "Procalcitonin 0.34 ng/mL     Narrative:      As a Marker for Sepsis (Non-Neonates):     1. <0.5 ng/mL represents a low risk of severe sepsis and/or septic shock.  2. >2 ng/mL represents a high risk of severe sepsis and/or septic shock.    As a Marker for Lower Respiratory Tract Infections that require antibiotic therapy:  PCT on Admission     Antibiotic Therapy             6-12 Hrs later  >0.5                          Strongly Recommended            >0.25 - <0.5             Recommended  0.1 - 0.25                  Discouraged                       Remeasure/reassess PCT  <0.1                         Strongly Discouraged         Remeasure/reassess PCT      As 28 day mortality risk marker: \"Change in Procalcitonin Result\" (>80% or <=80%) if Day 0 (or Day 1) and Day 4 values are available. Refer to [email or web address]. [email or web address] [facility] CityAunt [email or web address]    Change in PCT <=80 %   A decrease of PCT levels below or equal to 80% defines a positive change in PCT test result representing a higher risk for 28-day all-cause mortality of patients diagnosed with severe sepsis or septic shock.    Change in PCT >80 %   A decrease of PCT levels of more than 80% defines a negative change in PCT result representing a lower risk for 28-day all-cause mortality of patients diagnosed with severe sepsis or septic shock.        Urine Drug Screen - Urine, Clean Catch [[id number]]  (Abnormal) Collected: 06/01/21 1925    Specimen: Urine, Clean Catch Updated: 06/01/21 1945     THC, Screen, Urine Negative     Phencyclidine (PCP), Urine Negative     Cocaine Screen, Urine Negative     Methamphetamine, Ur Negative     Opiate Screen Negative     Amphetamine Screen, Urine Negative     Benzodiazepine Screen, Urine Negative     Tricyclic Antidepressants Screen Negative     Methadone Screen, Urine Negative     Barbiturates Screen, Urine Negative     Oxycodone Screen, Urine Positive     Propoxyphene Screen Negative     Buprenorphine, Screen, Urine Negative    Narrative:   "    Cutoff For Drugs Screened:    Amphetamines               500 ng/ml  Barbiturates               200 ng/ml  Benzodiazepines            150 ng/ml  Cocaine                    150 ng/ml  Methadone                  200 ng/ml  Opiates                    100 ng/ml  Phencyclidine               25 ng/ml  THC                            50 ng/ml  Methamphetamine            500 ng/ml  Tricyclic Antidepressants  300 ng/ml  Oxycodone                  100 ng/ml  Propoxyphene               300 ng/ml  Buprenorphine               10 ng/ml    The normal value for all drugs tested is negative. This report includes unconfirmed screening results, with the cutoff values listed, to be used for medical treatment purposes only.  Unconfirmed results must not be used for non-medical purposes such as employment or legal testing.  Clinical consideration should be applied to any drug of abuse test, particularly when unconfirmed results are used.      Urinalysis, Microscopic Only - Urine, Catheter [[id number]]  (Abnormal) Collected: 06/01/21 1925    Specimen: Urine, Catheter Updated: 06/01/21 1944     RBC, UA 0-2 /HPF      WBC, UA 3-5 /HPF      Bacteria, UA None Seen /HPF      Squamous Epithelial Cells, UA 0-2 /HPF      Hyaline Casts, UA 0-2 /LPF      Methodology Automated Microscopy    Urinalysis With Culture If Indicated - Urine, Catheter [[id number]]  (Abnormal) Collected: 06/01/21 1925    Specimen: Urine, Catheter Updated: 06/01/21 1944     Color, UA Dark Yellow     Appearance, UA Clear     pH, UA 5.5     Specific Gravity, UA 1.018     Glucose, UA Negative     Ketones, UA Negative     Bilirubin, UA Negative     Blood, UA Trace     Protein, UA >=300 mg/dL (3+)     Leuk Esterase, UA Small (1+)     Nitrite, UA Negative     Urobilinogen, UA 1.0 E.U./dL    Comprehensive Metabolic Panel [[id number]]  (Abnormal) Collected: 06/01/21 1910    Specimen: Blood Updated: 06/01/21 1936     Glucose 144 mg/dL      BUN 24 mg/dL      Creatinine 3.42 mg/dL       Sodium 131 mmol/L      Potassium 3.6 mmol/L      Chloride 89 mmol/L      CO2 26.0 mmol/L      Calcium 9.1 mg/dL      Total Protein 6.7 g/dL      Albumin 3.70 g/dL      ALT (SGPT) 17 U/L      AST (SGOT) 21 U/L      Alkaline Phosphatase 260 U/L      Total Bilirubin 0.4 mg/dL      eGFR Non African Amer 14 mL/min/1.73      Comment: <15 Indicative of kidney failure.        eGFR   Amer --     Comment: <15 Indicative of kidney failure.        Globulin 3.0 gm/dL      A/G Ratio 1.2 g/dL      BUN/Creatinine Ratio 7.0     Anion Gap 16.0 mmol/L     Narrative:      GFR Normal >60  Chronic Kidney Disease <60  Kidney Failure <15      Ammonia [[id number]]  (Normal) Collected: 06/01/21 1910    Specimen: Blood Updated: 06/01/21 1936     Ammonia 17 umol/L     Phosphorus [[id number]]  (Normal) Collected: 06/01/21 1759    Specimen: Blood Updated: 06/01/21 1935     Phosphorus 4.2 mg/dL     Ethanol [[id number]] Collected: 06/01/21 1759    Specimen: Blood Updated: 06/01/21 1933     Ethanol % <0.010 %     Narrative:      Not for legal purposes. Chain of Custody not followed.     Lactic Acid, Plasma [[id number]]  (Normal) Collected: 06/01/21 1911    Specimen: Blood Updated: 06/01/21 1932     Lactate 1.0 mmol/L     Magnesium [[id number]]  (Normal) Collected: 06/01/21 1759    Specimen: Blood Updated: 06/01/21 1932     Magnesium 1.9 mg/dL     aPTT [[id number]]  (Abnormal) Collected: 06/01/21 1910    Specimen: Blood Updated: 06/01/21 1928     PTT 49.2 seconds     Protime-INR [[id number]]  (Abnormal) Collected: 06/01/21 1910    Specimen: Blood Updated: 06/01/21 1928     Protime 14.2 Seconds      INR 1.19     Collected: 06/01/21 1642     Updated: 06/01/21 1853                Collected: 06/01/21 1642     Updated: 06/01/21 1853                Collected: 06/01/21 1642     Updated: 06/01/21 1853                        Collected: 06/01/21 1642     Updated: 06/01/21 5003                   Narrative:      As a Marker for Sepsis (Non-Neonates):     1.  "<0.5 ng/mL represents a low risk of severe sepsis and/or septic shock.  2. >2 ng/mL represents a high risk of severe sepsis and/or septic shock.    As a Marker for Lower Respiratory Tract Infections that require antibiotic therapy:  PCT on Admission     Antibiotic Therapy             6-12 Hrs later  >0.5                          Strongly Recommended            >0.25 - <0.5             Recommended  0.1 - 0.25                  Discouraged                       Remeasure/reassess PCT  <0.1                         Strongly Discouraged         Remeasure/reassess PCT      As 28 day mortality risk marker: \"Change in Procalcitonin Result\" (>80% or <=80%) if Day 0 (or Day 1) and Day 4 values are available. Refer to http://www.nlighten Technologiespct-calculator.com/    Change in PCT <=80 %   A decrease of PCT levels below or equal to 80% defines a positive change in PCT test result representing a higher risk for 28-day all-cause mortality of patients diagnosed with severe sepsis or septic shock.    Change in PCT >80 %   A decrease of PCT levels of more than 80% defines a negative change in PCT result representing a lower risk for 28-day all-cause mortality of patients diagnosed with severe sepsis or septic shock.                Phosphorus [[id number]] Collected: 06/01/21 1642    Specimen: Blood Updated: 06/01/21 1853     Phosphorus --     Comment: Corrected results  Corrected result. Previous result was 3.5 mg/dL on 6/1/2021 at 1711 CDT.       Green Top (Gel) [[id number]] Collected: 06/01/21 1642    Specimen: Blood Updated: 06/01/21 1853     Extra Tube Hold for add-ons.     Comment: Auto resulted.       Protime-INR [[id number]] Collected: 06/01/21 1642    Specimen: Blood Updated: 06/01/21 1852     Protime --     Comment: Specimen contaminated  Corrected result. Previous result was 14.9 Seconds on 6/1/2021 at 1702 CDT.        INR --     Comment: Specimen contaminated  Corrected result. Previous result was 1.27 on 6/1/2021 at 1702 CDT.       " aPTT [[id number]] Collected: 06/01/21 1642    Specimen: Blood Updated: 06/01/21 1852     PTT --     Comment: Specimen contaminated.  Corrected result. Previous result was 51.3 seconds on 6/1/2021 at 1702 CDT.       Light Blue Top [[id number]] Collected: 06/01/21 1642    Specimen: Blood Updated: 06/01/21 1852     Extra Tube hold for add-on     Comment: Auto resulted       Manual Differential [[id number]] Collected: 06/01/21 1642    Specimen: Blood Updated: 06/01/21 1841     Atypical Lymphocyte % --     Comment: Corrected result. Previous result was 1.0 % on 6/1/2021 at 1735 CDT.        Neutrophils Absolute --     Comment: Corrected result. Previous result was 2.20 10*3/mm3 on 6/1/2021 at 1735 CDT.        Lymphocytes Absolute --     Comment: Corrected result. Previous result was 0.36 10*3/mm3 on 6/1/2021 at 1735 CDT.        Monocytes Absolute --     Comment: Corrected result. Previous result was 0.08 10*3/mm3 on 6/1/2021 at 1735 CDT.        Basophils Absolute --     Comment: Corrected result. Previous result was 0.06 10*3/mm3 on 6/1/2021 at 1735 CDT.        nRBC --     Comment: Corrected result. Previous result was 2.1 /100 WBC on 6/1/2021 at 1735 CDT.        RBC Morphology Normal     Comment: Appended report. These results have been appended to a previously final verified report.        Anisocytosis --     Comment: Corrected result. Previous result was Slight/1+ on 6/1/2021 at 1735 CDT.        Basophilic Stippling --     Comment: Corrected result. Previous result was Slight/1+ on 6/1/2021 at 1735 CDT.        Crenated RBC's --     Comment: Corrected result. Previous result was Slight/1+ on 6/1/2021 at 1735 CDT.        Polychromasia --     Comment: Corrected result. Previous result was Slight/1+ on 6/1/2021 at 1735 CDT.        WBC Morphology --     Comment: Corrected result. Previous result was Normal on 6/1/2021 at 1735 CDT.        Platelet Morphology --     Comment: Corrected result. Previous result was Normal on  6/1/2021 at 1735 CDT.        Neutrophil/Total Counted Leukocytes, Manual 0     Comment: Corrected results  Appended report. These results have been appended to a previously final verified report.       Narrative:      The previously reported component Neutrophils % is no longer being reported. Previous result was 81.4 % (Reference Range: 42.7-76.0 %) on 6/1/2021 at 1735 CDT.  The previously reported component Lymphocytes % is no longer being reported. Previous result was 12.4 % (Reference Range: 19.6-45.3 %) on 6/1/2021 at 1735 CDT.  The previously reported component Monocytes % is no longer being reported. Previous result was 3.1 % (Reference Range: 5.0-12.0 %) on 6/1/2021 at 1735 CDT.  The previously reported component Basophils % is no longer being reported. Previous result was 2.1 % (Reference Range: 0.0-1.5 %) on 6/1/2021 at 1735 CDT.    Comprehensive Metabolic Panel [[id number]]  (Abnormal) Collected: 06/01/21 1759    Specimen: Blood Updated: 06/01/21 1830     Glucose 126 mg/dL      BUN 23 mg/dL      Creatinine 3.30 mg/dL      Sodium 133 mmol/L      Potassium 3.7 mmol/L      Chloride 91 mmol/L      CO2 28.0 mmol/L      Calcium 9.1 mg/dL      Total Protein 6.7 g/dL      Albumin 3.90 g/dL      ALT (SGPT) 17 U/L      AST (SGOT) 20 U/L      Alkaline Phosphatase 263 U/L      Total Bilirubin 0.4 mg/dL      eGFR Non African Amer 14 mL/min/1.73      Comment: <15 Indicative of kidney failure.        eGFR   Amer --     Comment: <15 Indicative of kidney failure.        Globulin 2.8 gm/dL      A/G Ratio 1.4 g/dL      BUN/Creatinine Ratio 7.0     Anion Gap 14.0 mmol/L     Narrative:      GFR Normal >60  Chronic Kidney Disease <60  Kidney Failure <15      CBC & Differential [[id number]]  (Abnormal) Collected: 06/01/21 1759    Specimen: Blood Updated: 06/01/21 1830    Narrative:      The following orders were created for panel order CBC & Differential.  Procedure                               Abnormality         Status                      ---------                               -----------         ------                     CBC Auto Differential[[id number]]        Abnormal            Final result                 Please view results for these tests on the individual orders.    CBC Auto Differential [[id number]]  (Abnormal) Collected: 06/01/21 1759    Specimen: Blood Updated: 06/01/21 1830     WBC 3.43 10*3/mm3      RBC 3.27 10*6/mm3      Hemoglobin 10.1 g/dL      Hematocrit 31.8 %      MCV 97.2 fL      MCH 30.9 pg      MCHC 31.8 g/dL      RDW 17.6 %      RDW-SD 61.7 fl      MPV 9.1 fL      Platelets 273 10*3/mm3      Neutrophil % 76.4 %      Lymphocyte % 9.6 %      Monocyte % 10.8 %      Eosinophil % 2.6 %      Basophil % 0.3 %      Immature Grans % 0.3 %      Neutrophils, Absolute 2.62 10*3/mm3      Lymphocytes, Absolute 0.33 10*3/mm3      Monocytes, Absolute 0.37 10*3/mm3      Eosinophils, Absolute 0.09 10*3/mm3      Basophils, Absolute 0.01 10*3/mm3      Immature Grans, Absolute 0.01 10*3/mm3      nRBC 1.7 /100 WBC     CBC & Differential [[id number]] Collected: 06/01/21 1642    Specimen: Blood Updated: 06/01/21 1826    Narrative:      The following orders were created for panel order CBC & Differential.  Procedure                               Abnormality         Status                     ---------                               -----------         ------                     CBC Auto Differential[[id number]]                            Edited Result - FINAL        Please view results for these tests on the individual orders.    CBC Auto Differential [[id number]] Collected: 06/01/21 1642    Specimen: Blood Updated: 06/01/21 1826     WBC --     Comment: Corrected results  Corrected result. Previous result was 2.70 10*3/mm3 on 6/1/2021 at 1735 CDT.        RBC --     Comment: Corrected results  Corrected result. Previous result was 3.00 10*6/mm3 on 6/1/2021 at 1735 CDT.        Hemoglobin --     Comment: Corrected results  Corrected result.  Previous result was 9.5 g/dL on 6/1/2021 at 1735 CDT.        Hematocrit --     Comment: Corrected results  Corrected result. Previous result was 30.7 % on 6/1/2021 at 1735 CDT.        MCV --     Comment: Corrected results  Corrected result. Previous result was 102.3 fL on 6/1/2021 at 1735 CDT.        MCH --     Comment: Corrected results  Corrected result. Previous result was 31.7 pg on 6/1/2021 at 1735 CDT.        MCHC --     Comment: Corrected results  Corrected result. Previous result was 30.9 g/dL on 6/1/2021 at 1735 CDT.        RDW --     Comment: Corrected results  Corrected result. Previous result was 18.0 % on 6/1/2021 at 1735 CDT.        RDW-SD --     Comment: Corrected result. Previous result was 65.1 fl on 6/1/2021 at 1735 CDT.        MPV --     Comment: Corrected result. Previous result was 9.1 fL on 6/1/2021 at 1735 CDT.        Platelets --     Comment: Corrected results  Corrected result. Previous result was 213 10*3/mm3 on 6/1/2021 at 1735 CDT.       Narrative:      The previously reported component NRBC is no longer being reported. Previous result was 3.0 /100 WBC (Reference Range: 0.0-0.2 /100 WBC) on 6/1/2021 at 1708 CDT.    Red Top [[id number]] Collected: 06/01/21 1642    Specimen: Blood Updated: 06/01/21 1745     Extra Tube Hold for add-ons.     Comment: Auto resulted.       Lavender Top [[id number]] Collected: 06/01/21 1642    Specimen: Blood Updated: 06/01/21 1745     Extra Tube hold for add-on     Comment: Auto resulted       Blood Culture - Blood, Arm, Right [[id number]] Collected: 06/01/21 1711    Specimen: Blood from Arm, Right Updated: 06/01/21 1717    Blood Culture - Blood, Arm, Right [[id number]] Collected: 06/01/21 1705    Specimen: Blood from Arm, Right Updated: 06/01/21 1717    Blood Gas, Arterial - [[id number]]  (Abnormal) Collected: 06/01/21 1655    Specimen: Arterial Blood Updated: 06/01/21 1658     Site Right Radial     Rustam's Test Positive     pH, Arterial 7.430 pH units      pCO2,  Arterial 43.5 mm Hg      pO2, Arterial 63.3 mm Hg      Comment: 84 Value below reference range        HCO3, Arterial 28.8 mmol/L      Comment: 83 Value above reference range        Base Excess, Arterial 4.0 mmol/L      Comment: 83 Value above reference range        O2 Saturation, Arterial 93.8 %      Comment: 84 Value below reference range        Temperature 37.0 C      Barometric Pressure for Blood Gas 753 mmHg      Modality Room Air     FIO2 21 %      Ventilator Mode NA     Collected by [id number]     Comment: Meter: [id number]     :  [id number]        pCO2, Temperature Corrected 43.5 mm Hg      pH, Temp Corrected 7.430 pH Units      pO2, Temperature Corrected 63.3 mm Hg     POC Glucose Once [[id number]]  (Abnormal) Collected: 06/01/21 1632    Specimen: Blood Updated: 06/01/21 1643     Glucose 47 mg/dL      Comment: : [id number] [employer] JessicaMeter ID: [id number]       POC Glucose Once [[id number]]  (Abnormal) Collected: 06/01/21 1630    Specimen: Blood Updated: 06/01/21 1643     Glucose 46 mg/dL      Comment: : [id number] [employer] JessicaMeter ID: [id number]           Imaging Results (Last 24 Hours)     Procedure Component Value Units Date/Time    CT Abdomen Pelvis Without Contrast [[id number]] Collected: 06/01/21 1952     Updated: 06/01/21 1957    Narrative:      CT ABDOMEN PELVIS WO CONTRAST- 6/1/2021 6:51 PM CDT     HISTORY: AMS; R41.82-Altered mental status, unspecified;  E16.2-Hypoglycemia, unspecified      COMPARISON: Chest CT dated 6/1/2021      DOSE LENGTH PRODUCT: 709 mGy cm. Automated exposure control was also  utilized to decrease patient radiation dose.     TECHNIQUE: Noncontrast enhanced images of the abdomen and pelvis  obtained without oral contrast. Multiplanar reformatted images were  provided for review.      FINDINGS:      LIVER: No focal liver lesion within limits of a noncontrast study.      BILIARY SYSTEM: The gallbladder is surgically absent. No intrahepatic or  extrahepatic ductal  dilatation.      PANCREAS: No focal pancreatic lesion within limits of a noncontrast  study.      SPLEEN: Unremarkable.      KIDNEYS: Bilateral renal atrophy. No hydronephrosis. The ureters are  decompressed and normal in appearance.     ADRENALS: Unremarkable.     RETROPERITONEUM: No mass, lymphadenopathy or hemorrhage.      GI TRACT: The stomach and small bowel are unremarkable. Moderate colonic  stool. No inflammatory changes along the bowel.      OTHER: Bilateral upper thigh and diffuse body wall subcutaneous edema  reflecting systemic volume overload. Trace ascites which I suspect is  also related to the systemic volume overload. No acute bony abnormality.     PELVIS: No mass lesion, fluid collection or significant lymphadenopathy  is seen in the pelvis. The urinary bladder is normal in appearance.       Impression:      1. Anasarca. Otherwise, no acute process involving the abdomen or  pelvis.  This report was finalized on 06/01/2021 19:54 by Dr Jhonatan Berry, .    CT Chest Without Contrast Diagnostic [[id number]] Collected: 06/01/21 1948     Updated: 06/01/21 1955    Narrative:      CT CHEST WO CONTRAST DIAGNOSTIC- 6/1/2021 6:51 PM CDT     HISTORY: AMS; R41.82-Altered mental status, unspecified;  E16.2-Hypoglycemia, unspecified     COMPARISON: CT scan dated 1/19/2020     DOSE LENGTH PRODUCT: 257 mGy cm. Automated exposure control was also  utilized to decrease patient radiation dose.     TECHNIQUE: Serial helical tomographic images of the chest were acquired  without contrast. Multiplanar reformatted images were provided for  review.     FINDINGS:     Visualized neck base: The imaged portion of the base of the neck appears  unremarkable.      Lungs: Right middle and right lower lobe airways are completely  opacified with debris and there is atelectasis of both of these lobes.  Interlobular septal thickening and peribronchial thickening with  bilateral groundglass opacities, reflecting pulmonary edema. There is  a  moderate to large pleural effusion on the right. 7 mm left upper lobe  pulmonary nodule (series 3-image 47).     Heart: Prominent four-chamber cardiomegaly. There is no pericardial  effusion. Advanced coronary artery atheromatous calcification.     Vasculature: There is calcified atheromatous plaque in the aorta without  aneurysmal dilation. The pulmonary arteries are enlarged, suggestive of  pulmonary arterial hypertension.     Mediastinum and lymph nodes: No suspicious hilar or mediastinal  adenopathy..     Skeletal and soft tissues: Diffuse chest wall edema. Comminuted  fracturing of the right proximal humerus.       Impression:      1. Volume overload with interstitial and casey pulmonary edema. There is  also a moderate to large right pleural effusion.  2. There is debris diffusely opacifying the right middle and right lower  lobe airways with atelectasis of both of these lobes, suspected mucous  plugging or aspirated contents.  3. Comminuted fracturing of the right proximal humerus.  This report was finalized on 06/01/2021 19:52 by Dr Jhonatan Berry, .    CT Head Without Contrast [[id number]] Collected: 06/01/21 1930     Updated: 06/01/21 1934    Narrative:      CT HEAD WO CONTRAST- 6/1/2021 6:51 PM CDT     HISTORY: Mental status change, unknown cause; R41.82-Altered mental  status, unspecified; E16.2-Hypoglycemia, unspecified       DOSE LENGTH PRODUCT: 668 mGy cm. Automated exposure control was also  utilized to decrease patient radiation dose.     Technique:   Axial CT of the brain without IV contrast. Sagittal and coronal  reformations are also provided for review. Soft tissue and bone kernels  are available for interpretation.     Comparison: CT scan dated 4/3/2021.     Findings:      There is no evidence of acute large vascular territory infarct. No  intra-axial or extra-axial hemorrhage. No visualized mass lesion or mass  effect. The ventricles, cortical sulci and basal cisterns are symmetric  and age  appropriate.  Posterior fossa structures are unremarkable. The  scalp and calvarium are intact. Visualized paranasal sinuses and  mastoids are clear.        Impression:      Impression:    1. No acute intracranial process. Stable exam.  This report was finalized on 06/01/2021 19:31 by Dr Jhonatan Berry, .    XR Chest 1 View [[id number]] Collected: 06/01/21 1834     Updated: 06/01/21 1837    Narrative:      Frontal upright radiograph of the chest 6/1/2021 6:10 PM CDT     HISTORY: Central line     COMPARISON: 6/1/2021.       Impression:      FINDINGS/IMPRESSION:      Right IJ central line is in good position. Otherwise stable. No  pneumothorax.  This report was finalized on 06/01/2021 18:34 by Dr Jhonatan Berry, .    XR Chest 1 View [[id number]] Collected: 06/01/21 1715     Updated: 06/01/21 1720    Narrative:      Frontal upright radiograph of the chest 6/1/2021 5:12 PM CDT     HISTORY: Altered mental status     COMPARISON: Chest exam dated 5/15/2021.     FINDINGS:      Stable moderate to large layering right pleural effusion with continued  opacification of the right lung base. Left lung is clear. Heart size is  stable. The pulmonary vasculature are nondilated. No pneumothorax.  Partially imaged impacted fracture across the surgical neck of the right  proximal humerus.       Impression:      1. No significant interval change from the recent comparison exam. There  is a moderate to large layering right pleural effusion with  opacification of the right lung base.        This report was finalized on 06/01/2021 17:17 by Dr Jhonatan Berry, .        I have personally reviewed and interpreted the radiology studies and ECG obtained at time of admission.     Assessment / Plan     Assessment:   Active Hospital Problems    Diagnosis    • **Altered mental status    • Sepsis (CMS/HCC)    • Hypoglycemia    • End stage renal failure on dialysis (CMS/HCC)    • Diabetes (CMS/HCC)    • HTN (hypertension)    1.  Altered mental  status/encephalopathy uncertain if this is related to medication effect glucose regulation or infection.  We will hold all sedating medications serial neuro checks in ICU.  Culture blood sputum since it may be an aspiration event as well.  Will start broad-spectrum antibiotics for now monitor volume status.  Will DC Narcan drip as it does not work.  Her sugars have come up so we will change her D10 over to D5 normal saline since her sodium is low.do not want to drop it down any further.  2.  End-stage renal disease on dialysis consult nephrology for assistance.  3.  Type 2 diabetes with hypoglycemia hold long-acting insulin and oral hypoglycemics will do Accu-Chek sliding scale insulin continue D5 for now.  4.  Sepsis uncertain if this is the case or not we will culture blood and sputum antibiotics as above.    Patient seen prior to midnight       Code Status/Advanced Care Plan: Full    The patient's surrogate decision maker is unable to determine at this time.     I discussed my findings and recommendations with the nursing staff.    Estimated length of stay is to be determined.     The patient was seen and examined by me on June 1, 2021 at 10:50 PM.    Electronically signed by Tejinder Kent MD, 06/02/21, 07:10 CDT.        Electronically signed by Tejinder Kent MD at 06/02/21 0723          Emergency Department Notes      Maverick Edwards MD at 06/01/21 1649      Procedure Orders    1. Central Line At Bedside [[id number]] ordered by Maverick Edwards MD               Subjective   Patient is a 58-year-old dialysis patient who went to dialysis center today was somewhat confused but to give her oral glucose which improved her condition after dialysis she was much more confused blood sugar was low subsequent sent to the ED for evaluation in the ER was given an amp of D50 with some improvement but predominant improvement noted with Narcan x2 now she is answering questions.  Was placed on Narcan drip.      Altered  Mental Status  Presenting symptoms: behavior changes, confusion, lethargy and partial responsiveness    Severity:  Moderate  Most recent episode:  Today  Episode history:  Single  Timing:  Constant  Progression:  Waxing and waning  Chronicity:  New  Context: drug use    Context: not alcohol use, not dementia, not head injury, not homeless, not nursing home resident, not recent change in medication and not recent illness    Associated symptoms: weakness    Associated symptoms: no abdominal pain, normal movement, no difficulty breathing, no headaches, no slurred speech and no vomiting        Review of Systems   Unable to perform ROS: Mental status change   Gastrointestinal: Negative for abdominal pain and vomiting.   Neurological: Positive for weakness. Negative for headaches.   Psychiatric/Behavioral: Positive for confusion.       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    • Chronic otitis media    • Diabetes (CMS/[facility])    • End stage renal disease on dialysis (CMS/[facility])    • Eustachian tube dysfunction    • GERD (gastroesophageal reflux disease)    • Glaucoma    • HTN (hypertension)    • Hyperlipidemia    • Mucoid otitis media    • Noncompliance of patient with renal dialysis (CMS/[facility])    • Tobacco abuse        Allergies   Allergen Reactions   • Bupropion Nausea Only   • Chantix [Varenicline] Other (See Comments)     Does not remember   • Azithromycin Rash   • Levofloxacin Rash   • Penicillins Rash   • Sulfa Antibiotics Rash       Past Surgical History:   Procedure Laterality Date   • ARTERIOVENOUS FISTULA     • CATARACT EXTRACTION WITH INTRAOCULAR LENS IMPLANT     •  SECTION     • CHOLECYSTECTOMY     • MYRINGOTOMY W/ TUBES Bilateral    • MYRINGOTOMY W/ TUBES Right    • TUBAL ABDOMINAL LIGATION         Family History   Problem Relation Age of Onset   • Heart disease Mother    • Diabetes Father    • Colon cancer Neg Hx    • Colon polyps Neg Hx        Social  History     Socioeconomic History   • Marital status: Single     Spouse name: Not on file   • Number of children: Not on file   • Years of education: Not on file   • Highest education level: Not on file   Tobacco Use   • Smoking status: Current Every Day Smoker     Packs/day: 2.00     Years: 6.00     Pack years: 12.00     Types: Cigarettes   • Smokeless tobacco: Never Used   Vaping Use   • Vaping Use: Never used   Substance and Sexual Activity   • Alcohol use: No   • Drug use: No   • Sexual activity: Defer           Objective   Physical Exam  Vitals and nursing note reviewed. Exam conducted with a chaperone present.   Constitutional:       General: She is not in acute distress.     Appearance: She is well-developed and underweight. She is not toxic-appearing or diaphoretic.      Comments: Lethargic difficult to arouse   HENT:      Head: Normocephalic and atraumatic.      Comments: No hemotympanum     Mouth/Throat:      Mouth: Mucous membranes are moist.      Pharynx: Oropharynx is clear.   Eyes:      General: Lids are normal. Lids are everted, no foreign bodies appreciated.      Pupils: Pupils are equal, round, and reactive to light.   Neck:      Thyroid: No thyromegaly.      Vascular: Normal carotid pulses. No carotid bruit or JVD.      Trachea: Trachea and phonation normal. No tracheal tenderness or tracheal deviation.      Meningeal: Brudzinski's sign and Kernig's sign absent.      Comments: No cervical spine tenderness  Cardiovascular:      Rate and Rhythm: Normal rate and regular rhythm.      Pulses: Normal pulses.      Heart sounds: Normal heart sounds.      Comments: Hypotensive  Pulmonary:      Effort: Pulmonary effort is normal. Bradypnea present. No tachypnea or respiratory distress.      Breath sounds: No stridor. Examination of the right-middle field reveals decreased breath sounds. Examination of the right-lower field reveals decreased breath sounds. Examination of the left-lower field reveals wheezing.  Decreased breath sounds and wheezing present.      Comments: Midline trachea  Abdominal:      General: Abdomen is flat. Bowel sounds are normal. There is no distension.      Palpations: Abdomen is soft. There is no mass.      Tenderness: There is no abdominal tenderness. There is no guarding.      Comments: Soft nontender   Musculoskeletal:         General: Normal range of motion.      Cervical back: Full passive range of motion without pain, normal range of motion and neck supple. No rigidity.   Skin:     General: Skin is warm and dry.      Capillary Refill: Capillary refill takes less than 2 seconds.      Coloration: Skin is not pale.      Nails: There is no clubbing.   Neurological:      General: No focal deficit present.      Mental Status: She is lethargic, disoriented and confused.      GCS: GCS eye subscore is 3. GCS verbal subscore is 4. GCS motor subscore is 5.      Cranial Nerves: Cranial nerves are intact. No cranial nerve deficit.      Sensory: Sensation is intact. No sensory deficit.      Motor: Motor function is intact. No abnormal muscle tone.      Deep Tendon Reflexes: Reflexes are normal and symmetric. Reflexes normal. Babinski sign absent on the right side. Babinski sign absent on the left side.      Reflex Scores:       Bicep reflexes are 2+ on the right side and 2+ on the left side.       Patellar reflexes are 2+ on the right side and 2+ on the left side.     Comments: Focal neurological deficits orientation could not be checked the patient is confused   Psychiatric:         Behavior: Behavior is uncooperative.         Central Line At Bedside    Date/Time: 6/1/2021 6:03 PM  Performed by: Maverick Edwards MD  Authorized by: Benitez Farrell MD     Consent:     Consent obtained:  Emergent situation    Consent given by: Patient is confused encephalopathic no family members available require central line for hypertonic saline.    Risks discussed:  Arterial puncture, bleeding, infection, incorrect  placement, nerve damage and pneumothorax    Alternatives discussed:  Delayed treatment  Pre-procedure details:     Hand hygiene: Hand hygiene performed prior to insertion      Sterile barrier technique: All elements of maximal sterile technique followed      Skin preparation:  2% chlorhexidine  Anesthesia (see MAR for exact dosages):     Anesthesia method:  Local infiltration    Local anesthetic:  Lidocaine 1% w/o epi  Procedure details:     Location:  R internal jugular    Patient position:  Flat    Procedural supplies:  Triple lumen    Catheter size:  7.5 Fr    Landmarks identified: yes      Ultrasound guidance: yes      Sterile ultrasound techniques: Sterile gel and sterile probe covers were used      Number of attempts:  1    Successful placement: yes    Post-procedure details:     Post-procedure:  Dressing applied    Assessment:  Blood return through all ports, free fluid flow, placement verified by x-ray and no pneumothorax on x-ray    Patient tolerance of procedure:  Tolerated well, no immediate complications              ED Course  ED Course as of Jun 01 1807   Tue Jun 01, 2021 1718 Patient came in with hypoglycemia probably under the influence of narcotics which improved with dextrose and naloxone.  Naloxone drip has been ordered D10 drip has been ordered her blood pressure has been fluctuant from systolic 170 to a systolic of 86.  She is somewhat confused lab work-up is pending on this patient.    [TS]   1719 Large pleural effusion on the right side has been seen in the past also    [TS]   1720 I think keeping consideration the patient's flexion blood pressure hypoglycemia and her bradypnea this could be related to narcotics waiting on the urine drug screen come back and see any obvious evidence of infection on clinical examination she is not febrile.  Encephalopathic may be.    [TS]   1723 EKG showed normal sinus rhythm incomplete right bundle branch block    [TS]   1733 With altered mental status  encephalopathic which could be related to multifactorial etiologies polypharmacy uremia hypoglycemia and sedative use.    [TS]   1745 Patient sodium deficit is 664.2 mEq    [TS]   1745 The lab informed us that the patient's sodium 117 therefore a central line was placed by me the blood will be recollected    [TS]   1806 The lab is not releasing her chemistries therefore we will redraw them and wait for hypertonic saline infusion till the time you get the repeat labs    [TS]   1807 Also holding her Narcan because it is making her agitated.  We can give her  Zyprexa    [TS]      ED Course User Index  [TS] Maverick Edwards MD                        MDM  Number of Diagnoses or Management Options  Diagnosis management comments: Differential Diagnosis:  I considered toxic-metabolic etiology, hypoglycemia, hyperglycemia, diabetic ketoacidosis, drug overdose, ethanol intoxication, thiamine deficiency, hypothermia, hyponatremia, hypernatremia, organ failure, liver failure, kidney failure, thyroid failure, adrenal failure, hypoxia, hypercarbia, ischemic stroke, intracranial bleed, subarachnoid hemorrhage, closed head injury, subdural hematoma, seizure activity, syncopal episode, infectious etiology, hypertensive encephalopathy, vasculitis, thrombotic thrombocytopenic purpura and disseminated intravascular coagulation as a possible cause of altered mental status in this patient. This is a partial list of diagnoses considered.              Amount and/or Complexity of Data Reviewed  Clinical lab tests: ordered and reviewed  Tests in the radiology section of CPT®: ordered and reviewed  Tests in the medicine section of CPT®: reviewed and ordered    Risk of Complications, Morbidity, and/or Mortality  Presenting problems: moderate  Diagnostic procedures: moderate  Management options: moderate        Final diagnoses:   Altered mental status, unspecified altered mental status type   Hypoglycemia       ED Disposition  ED Disposition      None          No follow-up provider specified.       Medication List      No changes were made to your prescriptions during this visit.          Maverick Edwards MD  06/01/21 1734       Maverick Edwards MD  06/01/21 1803       Maverick Edwards MD  06/01/21 1807      Electronically signed by Maverick Edwards MD at 06/01/21 1807     Benitez Farrell MD at 06/01/21 2045        I took over the care of this patient from Dr. Chino at shift change.  The overall picture could be 1 of impending sepsis as the CT scan did seem to show aspiration and the patient is currently hypothermic.  Overall I think it behooves us to admit the patient to the ICU and initiate empiric antibiotics.  The patient is still somewhat agitated but has relatively stable vitals otherwise.  Please see Dr. Chino's note for the full history and physical.  I discussed the case with Dr. Kent who kindly agreed to admit the patient under his care.     Benitez Farrell MD  06/01/21 2046      Electronically signed by Benitez Farrell MD at 06/01/21 2046         Facility-Administered Medications as of 6/2/2021   Medication Dose Route Frequency Provider Last Rate Last Admin   • acetaminophen (TYLENOL) tablet 650 mg  650 mg Oral Q4H PRN Tejinder Kent MD        Or   • acetaminophen (TYLENOL) suppository 650 mg  650 mg Rectal Q4H PRN Tejinder Kent MD       • aspirin EC tablet 81 mg  81 mg Oral Daily Tejinder Kent MD       • atropine 1 % ophthalmic solution 1 drop  1 drop Left Eye Daily Tejinder Kent MD   1 drop at 06/02/21 0914   • brimonidine (ALPHAGAN) 0.2 % ophthalmic solution 1 drop  1 drop Left Eye TID Tejinder Kent MD   1 drop at 06/02/21 0915   • carvedilol (COREG) tablet 6.25 mg  6.25 mg Oral BID With Meals Tejinder Kent MD       • cholecalciferol (VITAMIN D3) tablet 2,000 Units  2,000 Units Oral Daily Tejinder Kent MD       • [COMPLETED] dextrose (D50W) 25 g/ 50mL Intravenous Solution 25 g  25 g Intravenous  Once Maverick Edwards MD   25 g at 06/01/21 1638   • dextrose (D50W) 25 g/ 50mL Intravenous Solution 25 g  25 g Intravenous Q15 Min PRN Tejinder Kent MD       • dextrose (GLUTOSE) oral gel 15 g  15 g Oral Q15 Min PRN Tejinder Kent MD       • dorzolamide (TRUSOPT) 2 % ophthalmic solution 1 drop  1 drop Left Eye BID Tejinder Kent MD   1 drop at 06/02/21 0914   • enoxaparin (LOVENOX) syringe 30 mg  30 mg Subcutaneous Q24H Tejinder Kent MD   30 mg at 06/02/21 0920   • [START ON 6/3/2021] famotidine (PEPCID) tablet 20 mg  20 mg Oral Daily Bin Guy DO       • Ferric Citrate tablet 840 mg  4 tablet Oral TID With Meals Tejinder Kent MD       • fluticasone (FLONASE) 50 MCG/ACT nasal spray 2 spray  2 spray Nasal BID Tejinder Kent MD   2 spray at 06/02/21 0914   • [COMPLETED] furosemide (LASIX) injection 80 mg  80 mg Intravenous Once Benitez Farrell MD   80 mg at 06/01/21 2107   • glucagon (human recombinant) (GLUCAGEN DIAGNOSTIC) injection 1 mg  1 mg Subcutaneous Q15 Min PRN Tejinder Kent MD       • [COMPLETED] haloperidol lactate (HALDOL) injection 2 mg  2 mg Intravenous Once Benitez Farrell MD   2 mg at 06/01/21 1838   • insulin lispro (humaLOG) injection 2-7 Units  2-7 Units Subcutaneous TID AC Tejinder Kent MD       • ipratropium-albuterol (DUO-NEB) nebulizer solution 3 mL  3 mL Nebulization Q4H PRN Tejinder Kent MD       • lactobacillus acidophilus (RISAQUAD) capsule 1 capsule  1 capsule Oral Daily Tejinder Kent MD       • losartan (COZAAR) tablet 50 mg  50 mg Oral BID Tejinder Kent MD       • metoclopramide (REGLAN) tablet 5 mg  5 mg Oral 4x Daily AC & at Bedtime Tejinder Kent MD       • midodrine (PROAMATINE) tablet 5 mg  5 mg Oral TID AC Tejinder Kent MD       • [COMPLETED] naloxone (NARCAN) injection 0.4 mg  0.4 mg Intravenous Once Maverick Edwards MD   0.4 mg at 06/01/21 1643   • [COMPLETED] naloxone (NARCAN) injection 0.4 mg  0.4 mg  Intravenous Once Maverick Edwards MD   0.4 mg at 06/01/21 1646   • [COMPLETED] OLANZapine (zyPREXA) injection 10 mg  10 mg Intramuscular Once Benitez Farrell MD   10 mg at 06/01/21 1812   • ondansetron (ZOFRAN) tablet 4 mg  4 mg Oral Q6H PRN Tejinder Kent MD       • Pharmacy to Dose Zosyn   Does not apply Continuous PRN Tejinder Kent MD       • piperacillin-tazobactam (ZOSYN) 3.375 g in iso-osmotic dextrose 50 ml (premix)  3.375 g Intravenous Q12H Tejinder Kent MD   3.375 g at 06/02/21 0906   • [COMPLETED] piperacillin-tazobactam (ZOSYN) 3.375 g/100 mL 0.9% NS IVPB (mbp)  3.375 g Intravenous Once Benitez Farrell MD   3.375 g at 06/01/21 2106   • pravastatin (PRAVACHOL) tablet 10 mg  10 mg Oral Nightly Tejinder Kent MD       • sertraline (ZOLOFT) tablet 25 mg  25 mg Oral Daily Tejinder Kent MD       • sodium chloride 0.9 % flush 10 mL  10 mL Intravenous Q12H Tejinder Kent MD   10 mL at 06/02/21 0922   • sodium chloride 0.9 % flush 10 mL  10 mL Intravenous PRN Tejinder Kent MD       • timolol (TIMOPTIC) 0.5 % ophthalmic solution 1 drop  1 drop Left Eye Daily Tejinder Kent MD   1 drop at 06/02/21 0913   • [COMPLETED] vancomycin 1250 mg/250 mL 0.9% NS IVPB ([facility])  20 mg/kg Intravenous Once Benitez Farrell MD   1,250 mg at 06/01/21 2111   • [START ON 6/3/2021] vancomycin 750 mg/250 mL 0.9% NS IVPB ([facility])  750 mg Intravenous Once per day on Tue Thu Sat Tejinder Kent MD                Consult Notes (last 48 hours) (Notes from 05/31/21 1400 through 06/02/21 1400)      Gomez Lemos, ANTHONY at 06/02/21 0831      Consult Orders    1. Inpatient Nephrology Consult [[id number]] ordered by Tejinder Kent MD at 06/02/21 0300          Attestation signed by Jose Caballero MD at 06/02/21 1305    I have personally examined the patient and reviewed all the data.  I also agree with history and physical examination as well as plan generated by my nurse  practitioner.    Chief complaint: Decreasing mental status    61 years old noncompliant dialysis patient.  She has end-stage renal disease secondary to diabetic nephropathy.  She goes to [facility] on Tuesday Thursday Saturday routine dialysis.  She misses treatments frequently.  However she has received treatment yesterday as per schedule.  Presenting with decreasing mental status and was found to have severe hypoglycemia.  She is currently being treated with intravenous dextrose.  She is currently admitted to ICU for close monitoring.    Assessment:  1.  End-stage renal disease on maintenance dialysis.  2.  Type II diabetic nephropathy.  3.  Metabolic encephalopathy.  4.  Anemia of chronic kidney disease.  5.  Chronic obstructive pulmonary disease.  6.  Possible sepsis    Plan:  1.  Hemodialysis treatment tomorrow.  2.  Close monitoring of blood sugar.  3.  Close monitoring of neuro status.                    Nephrology ([facility]) Consult Note      Patient:  Mini Gentile  YOB: 1960  Date of Service: 6/2/2021  MRN: [id number]   Acct: [id number]   Primary Care Physician: Bharati Dahl APRN  Advance Directive:   Code Status and Medical Interventions:   Ordered at: 06/02/21 0310     Code Status:    CPR     Medical Interventions (Level of Support Prior to Arrest):    Full     Admit Date: 6/1/2021       Hospital Day: 1  Referring Provider: Tejinder Kent MD      Patient personally seen and examined.  Complete chart including Consults, Notes, Operative Reports, Labs, Cardiology, and Radiology studies reviewed as able.        Subjective:  Mini Gentile is a 61 y.o. female  whom we were consulted for end stage renal disease. Maintenance hemodialysis on Tuesday Thursday Saturday at [facility]. Fairly noncompliant with dialysis treatments but did attend on 6/01.  Discharged two weeks ago from [facility] following fistulogram/venoplasty of left extremity  on 5/13.  Presented this time with altered mental status. She was hypoglycemic, treated with V dextrose, and was also given Narcan which seemed to help improve her level of responsiveness. Currently is awake but confused. Unable to provide any meaningful history.      Allergies:  Bupropion, Chantix [varenicline], Gabapentin, Azithromycin, Levofloxacin, Penicillins, and Sulfa antibiotics    Home Meds:  Medications Prior to Admission   Medication Sig Dispense Refill Last Dose   • Ferric Citrate 1  MG(Fe) tablet Take  by mouth.      • metoclopramide (REGLAN) 5 MG tablet Take 5 mg by mouth 4 (Four) Times a Day Before Meals & at Bedtime.      • midodrine (PROAMATINE) 5 MG tablet Take 5 mg by mouth 3 (Three) Times a Day Before Meals.      • traMADol (ULTRAM) 50 MG tablet Take 50 mg by mouth Every 6 (Six) Hours As Needed for Moderate Pain .      • Travoprost (TRAVATAN OP) Apply  to eye(s) as directed by provider.      • albuterol sulfate  (90 Base) MCG/ACT inhaler Inhale 2 puffs Every 4 (Four) Hours As Needed for Wheezing. 18 g 0    • aspirin 81 MG EC tablet Take 81 mg by mouth Daily.      • atropine 1 % ophthalmic solution Administer 1 drop into the left eye Daily.      • brimonidine (ALPHAGAN) 0.2 % ophthalmic solution Administer 1 drop into the left eye 3 (Three) Times a Day.      • carvedilol (COREG) 6.25 MG tablet Take 6.25 mg by mouth 2 (Two) Times a Day With Meals.      • cholecalciferol (VITAMIN D3) 1000 units tablet Take 2,000 Units by mouth Daily.      • dorzolamide (TRUSOPT) 2 % ophthalmic solution Administer 1 drop into the left eye 2 (Two) Times a Day.      • famotidine (PEPCID) 20 MG tablet Take 20 mg by mouth 2 (Two) Times a Day.      • fluticasone (FLONASE) 50 MCG/ACT nasal spray 2 sprays into the nostril(s) as directed by provider 2 (Two) Times a Day.      • insulin glargine (LANTUS) 100 UNIT/ML injection Inject 10 Units under the skin Every Night.      • ipratropium-albuterol (DUO-NEB)  0.5-2.5 mg/3 ml nebulizer Take 3 mL by nebulization Every 4 (Four) Hours As Needed for Wheezing or Shortness of Air.      • lactobacillus acidophilus (RISAQUAD) capsule capsule Take 1 capsule by mouth Daily. 30 capsule 2    • levothyroxine (SYNTHROID, LEVOTHROID) 25 MCG tablet Take 1 tablet by mouth Daily. 30 tablet 0    • losartan (COZAAR) 50 MG tablet Take 50 mg by mouth 2 (Two) Times a Day.      • ondansetron (ZOFRAN) 4 MG tablet Take 1 tablet by mouth Every 6 (Six) Hours As Needed for Nausea or Vomiting. 24 tablet 2    • pantoprazole (PROTONIX) 40 MG EC tablet Take 1 tablet by mouth Daily. 30 tablet 1    • pravastatin (PRAVACHOL) 10 MG tablet Take 10 mg by mouth Every Night.      • pregabalin (LYRICA) 50 MG capsule Take 50 mg by mouth 2 (Two) Times a Day.      • sertraline (ZOLOFT) 25 MG tablet Take 25 mg by mouth Daily.      • sevelamer (RENVELA) 800 MG tablet Take 2,400 mg by mouth 3 (Three) Times a Day With Meals.      • timolol (TIMOPTIC) 0.5 % ophthalmic solution Administer 1 drop into the left eye Every Morning.          Medicines:  Current Facility-Administered Medications   Medication Dose Route Frequency Provider Last Rate Last Admin   • acetaminophen (TYLENOL) tablet 650 mg  650 mg Oral Q4H PRN Tejinder Kent MD        Or   • acetaminophen (TYLENOL) suppository 650 mg  650 mg Rectal Q4H PRN Tejinder Kent MD       • aspirin EC tablet 81 mg  81 mg Oral Daily Tejinder Kent MD       • atropine 1 % ophthalmic solution 1 drop  1 drop Left Eye Daily Tejinder Kent MD       • brimonidine (ALPHAGAN) 0.2 % ophthalmic solution 1 drop  1 drop Left Eye TID Tejinder Kent MD       • carvedilol (COREG) tablet 6.25 mg  6.25 mg Oral BID With Meals Tejinder Kent MD       • cholecalciferol (VITAMIN D3) tablet 2,000 Units  2,000 Units Oral Daily Tejinder Kent MD       • dextrose (D50W) 25 g/ 50mL Intravenous Solution 25 g  25 g Intravenous Q15 Min PRN Tejinder Kent MD       • dextrose  (GLUTOSE) oral gel 15 g  15 g Oral Q15 Min PRN Tejidner Kent MD       • dorzolamide (TRUSOPT) 2 % ophthalmic solution 1 drop  1 drop Left Eye BID Tejinder Kent MD       • enoxaparin (LOVENOX) syringe 30 mg  30 mg Subcutaneous Q24H Tejinder Kent MD       • famotidine (PEPCID) tablet 20 mg  20 mg Oral BID Tejinder Kent MD       • Ferric Citrate tablet 840 mg  4 tablet Oral TID With Meals Tejinder Kent MD       • fluticasone (FLONASE) 50 MCG/ACT nasal spray 2 spray  2 spray Nasal BID Tejinder Kent MD       • glucagon (human recombinant) (GLUCAGEN DIAGNOSTIC) injection 1 mg  1 mg Subcutaneous Q15 Min PRN Tejinder Kent MD       • insulin lispro (humaLOG) injection 2-7 Units  2-7 Units Subcutaneous TID AC Tejinder Kent MD       • ipratropium-albuterol (DUO-NEB) nebulizer solution 3 mL  3 mL Nebulization Q4H PRN Tejinder Kent MD       • lactobacillus acidophilus (RISAQUAD) capsule 1 capsule  1 capsule Oral Daily Tejinder Kent MD       • losartan (COZAAR) tablet 50 mg  50 mg Oral BID Tejinder Kent MD       • metoclopramide (REGLAN) tablet 5 mg  5 mg Oral 4x Daily AC & at Bedtime Tejinder Kent MD       • midodrine (PROAMATINE) tablet 5 mg  5 mg Oral TID AC Tejinder Kent MD       • ondansetron (ZOFRAN) tablet 4 mg  4 mg Oral Q6H PRN Tejinder Kent MD       • Pharmacy to Dose Zosyn   Does not apply Continuous PRN Tejinder Kent MD       • piperacillin-tazobactam (ZOSYN) 3.375 g in iso-osmotic dextrose 50 ml (premix)  3.375 g Intravenous Q12H Tejinder Kent MD       • pravastatin (PRAVACHOL) tablet 10 mg  10 mg Oral Nightly Tejinder Kent MD       • sertraline (ZOLOFT) tablet 25 mg  25 mg Oral Daily Tejinder Kent MD       • sodium chloride 0.9 % flush 10 mL  10 mL Intravenous Q12H Tejinder Kent MD       • sodium chloride 0.9 % flush 10 mL  10 mL Intravenous PRN Tejinder Kent MD       • timolol (TIMOPTIC) 0.5 % ophthalmic solution 1  drop  1 drop Left Eye Daily Tejinder Kent MD       • [START ON 6/3/2021] vancomycin 750 mg/250 mL 0.9% NS IVPB ([facility])  750 mg Intravenous Q48H Tejinder Kent MD           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    • Chronic otitis media    • Diabetes (CMS/[facility] [facility])    • End stage renal disease on dialysis (CMS/[facility])    • Eustachian tube dysfunction    • GERD (gastroesophageal reflux disease)    • Glaucoma    • HTN (hypertension)    • Hyperlipidemia    • Mucoid otitis media    • Noncompliance of patient with renal dialysis (CMS/[facility])    • Tobacco abuse        Past Surgical History:  Past Surgical History:   Procedure Laterality Date   • ARTERIOVENOUS FISTULA     • CATARACT EXTRACTION WITH INTRAOCULAR LENS IMPLANT     •  SECTION     • CHOLECYSTECTOMY     • MYRINGOTOMY W/ TUBES Bilateral    • MYRINGOTOMY W/ TUBES Right    • TUBAL ABDOMINAL LIGATION         Family History  Family History   Problem Relation Age of Onset   • Heart disease Mother    • Diabetes Father    • Colon cancer Neg Hx    • Colon polyps Neg Hx        Social History  Social History     Socioeconomic History   • Marital status: Single     Spouse name: Not on file   • Number of children: Not on file   • Years of education: Not on file   • Highest education level: Not on file   Tobacco Use   • Smoking status: Current Every Day Smoker     Packs/day: 2.00     Years: 6.00     Pack years: 12.00     Types: Cigarettes   • Smokeless tobacco: Never Used   Vaping Use   • Vaping Use: Never used   Substance and Sexual Activity   • Alcohol use: No   • Drug use: No   • Sexual activity: Defer         Review of Systems:   unable to obtain due to altered mental status    Objective:  Patient Vitals for the past 24 hrs:   BP Temp Temp src Pulse Resp SpO2 Height Weight   21 0709 139/87 -- -- -- -- -- -- --   21 0700 -- -- -- 80 13 96 % -- --   21 0605 (!) 142/108 -- -- 85  "19 100 % -- 65.1 kg (143 lb 9.6 oz)   06/02/21 0515 (!) 152/107 -- -- 86 20 100 % -- --   06/02/21 0400 108/50 96.9 °F (36.1 °C) Axillary 68 13 100 % -- --   06/02/21 0300 96/51 -- -- 68 15 98 % -- --   06/02/21 0200 109/49 -- -- 72 19 97 % -- --   06/02/21 0100 100/47 -- -- 72 17 98 % -- --   06/02/21 0000 104/46 96.4 °F (35.8 °C) Axillary 74 17 97 % -- --   06/01/21 2300 145/84 -- -- 80 16 96 % -- --   06/01/21 2230 163/91 95 °F (35 °C) Rectal -- 20 94 % 139.7 cm (55\") 65.1 kg (143 lb 9.6 oz)   06/01/21 2115 159/96 -- -- 89 -- 94 % -- --   06/01/21 2100 (!) 175/154 -- -- 90 -- 93 % -- --   06/01/21 2045 (!) 180/155 -- -- 89 -- 93 % -- --   06/01/21 2040 -- 95.2 °F (35.1 °C) Rectal -- -- -- -- --   06/01/21 2030 (!) 164/135 -- -- 91 -- 94 % -- --   06/01/21 2015 (!) 143/102 -- -- 91 -- 94 % -- --   06/01/21 2000 (!) 165/124 -- -- 92 -- -- -- --   06/01/21 1945 161/80 -- -- 94 -- -- -- --   06/01/21 1930 147/87 -- -- 94 -- 96 % -- --   06/01/21 1915 -- -- -- 94 -- 93 % -- --   06/01/21 1830 (!) 191/84 -- -- 93 -- 96 % -- --   06/01/21 1815 (!) 164/103 -- -- 87 -- 95 % -- --   06/01/21 1800 -- -- -- -- -- 93 % -- --   06/01/21 1745 -- -- -- -- -- -- 147.3 cm (58\") --   06/01/21 1745 -- -- -- 88 -- 93 % -- --   06/01/21 1730 148/94 -- -- 87 -- 93 % -- --   06/01/21 1715 (!) 81/50 -- -- 85 -- 93 % -- --   06/01/21 1700 167/77 -- -- 82 -- 94 % -- --   06/01/21 1645 172/63 -- -- 78 -- 91 % -- --   06/01/21 1636 -- 97.5 °F (36.4 °C) Axillary -- -- -- -- --   06/01/21 1635 (!) 87/70 -- -- -- -- -- -- --   06/01/21 1633 -- -- Oral 78 10 96 % -- --   06/01/21 1629 -- -- -- -- -- -- 152.4 cm (60\") 64.2 kg (141 lb 8.6 oz)       Intake/Output Summary (Last 24 hours) at 6/2/2021 0831  Last data filed at 6/2/2021 0400  Gross per 24 hour   Intake 668 ml   Output 50 ml   Net 618 ml     General: oriented X 1   Neck: supple, no JVD  Chest:  clear to auscultation bilaterally without respiratory distress  CVS: regular rate and " rhythm  Abdominal: soft, nontender, positive bowel sounds  Extremities: no cyanosis or edema  Skin: warm and dry without rash  Neuro: no focal motor deficits    Labs:  Results from last 7 days   Lab Units 06/02/21  0318 06/01/21  1759   WBC 10*3/mm3 2.93* 3.43   HEMOGLOBIN g/dL 9.1* 10.1*   HEMATOCRIT % 28.3* 31.8*   PLATELETS 10*3/mm3 175 273         Results from last 7 days   Lab Units 06/02/21  0318 06/01/21  1910 06/01/21  1759   SODIUM mmol/L 131* 131* 133*   POTASSIUM mmol/L 3.6 3.6 3.7   CHLORIDE mmol/L 92* 89* 91*   CO2 mmol/L 29.0 26.0 28.0   BUN mg/dL 25* 24* 23   CREATININE mg/dL 3.90* 3.42* 3.30*   CALCIUM mg/dL 8.3* 9.1 9.1   BILIRUBIN mg/dL 0.3 0.4 0.4   ALK PHOS U/L 206* 260* 263*   ALT (SGPT) U/L 14 17 17   AST (SGOT) U/L 17 21 20   GLUCOSE mg/dL 200* 144* 126*       Radiology:   Imaging Results (Last 72 Hours)     Procedure Component Value Units Date/Time    CT Abdomen Pelvis Without Contrast [[id number]] Collected: 06/01/21 1952     Updated: 06/01/21 1957    Narrative:      CT ABDOMEN PELVIS WO CONTRAST- 6/1/2021 6:51 PM CDT     HISTORY: AMS; R41.82-Altered mental status, unspecified;  E16.2-Hypoglycemia, unspecified      COMPARISON: Chest CT dated 6/1/2021      DOSE LENGTH PRODUCT: 709 mGy cm. Automated exposure control was also  utilized to decrease patient radiation dose.     TECHNIQUE: Noncontrast enhanced images of the abdomen and pelvis  obtained without oral contrast. Multiplanar reformatted images were  provided for review.      FINDINGS:      LIVER: No focal liver lesion within limits of a noncontrast study.      BILIARY SYSTEM: The gallbladder is surgically absent. No intrahepatic or  extrahepatic ductal dilatation.      PANCREAS: No focal pancreatic lesion within limits of a noncontrast  study.      SPLEEN: Unremarkable.      KIDNEYS: Bilateral renal atrophy. No hydronephrosis. The ureters are  decompressed and normal in appearance.     ADRENALS: Unremarkable.     RETROPERITONEUM: No  mass, lymphadenopathy or hemorrhage.      GI TRACT: The stomach and small bowel are unremarkable. Moderate colonic  stool. No inflammatory changes along the bowel.      OTHER: Bilateral upper thigh and diffuse body wall subcutaneous edema  reflecting systemic volume overload. Trace ascites which I suspect is  also related to the systemic volume overload. No acute bony abnormality.     PELVIS: No mass lesion, fluid collection or significant lymphadenopathy  is seen in the pelvis. The urinary bladder is normal in appearance.       Impression:      1. Anasarca. Otherwise, no acute process involving the abdomen or  pelvis.  This report was finalized on 06/01/2021 19:54 by Dr Jhonatan Berry, .    CT Chest Without Contrast Diagnostic [[id number]] Collected: 06/01/21 1948     Updated: 06/01/21 1955    Narrative:      CT CHEST WO CONTRAST DIAGNOSTIC- 6/1/2021 6:51 PM CDT     HISTORY: AMS; R41.82-Altered mental status, unspecified;  E16.2-Hypoglycemia, unspecified     COMPARISON: CT scan dated 1/19/2020     DOSE LENGTH PRODUCT: 257 mGy cm. Automated exposure control was also  utilized to decrease patient radiation dose.     TECHNIQUE: Serial helical tomographic images of the chest were acquired  without contrast. Multiplanar reformatted images were provided for  review.     FINDINGS:     Visualized neck base: The imaged portion of the base of the neck appears  unremarkable.      Lungs: Right middle and right lower lobe airways are completely  opacified with debris and there is atelectasis of both of these lobes.  Interlobular septal thickening and peribronchial thickening with  bilateral groundglass opacities, reflecting pulmonary edema. There is a  moderate to large pleural effusion on the right. 7 mm left upper lobe  pulmonary nodule (series 3-image 47).     Heart: Prominent four-chamber cardiomegaly. There is no pericardial  effusion. Advanced coronary artery atheromatous calcification.     Vasculature: There is calcified  atheromatous plaque in the aorta without  aneurysmal dilation. The pulmonary arteries are enlarged, suggestive of  pulmonary arterial hypertension.     Mediastinum and lymph nodes: No suspicious hilar or mediastinal  adenopathy..     Skeletal and soft tissues: Diffuse chest wall edema. Comminuted  fracturing of the right proximal humerus.       Impression:      1. Volume overload with interstitial and casey pulmonary edema. There is  also a moderate to large right pleural effusion.  2. There is debris diffusely opacifying the right middle and right lower  lobe airways with atelectasis of both of these lobes, suspected mucous  plugging or aspirated contents.  3. Comminuted fracturing of the right proximal humerus.  This report was finalized on 06/01/2021 19:52 by Dr Jhonatan Berry, .    CT Head Without Contrast [[id number]] Collected: 06/01/21 1930     Updated: 06/01/21 1934    Narrative:      CT HEAD WO CONTRAST- 6/1/2021 6:51 PM CDT     HISTORY: Mental status change, unknown cause; R41.82-Altered mental  status, unspecified; E16.2-Hypoglycemia, unspecified       DOSE LENGTH PRODUCT: 668 mGy cm. Automated exposure control was also  utilized to decrease patient radiation dose.     Technique:   Axial CT of the brain without IV contrast. Sagittal and coronal  reformations are also provided for review. Soft tissue and bone kernels  are available for interpretation.     Comparison: CT scan dated 4/3/2021.     Findings:      There is no evidence of acute large vascular territory infarct. No  intra-axial or extra-axial hemorrhage. No visualized mass lesion or mass  effect. The ventricles, cortical sulci and basal cisterns are symmetric  and age appropriate.  Posterior fossa structures are unremarkable. The  scalp and calvarium are intact. Visualized paranasal sinuses and  mastoids are clear.        Impression:      Impression:    1. No acute intracranial process. Stable exam.  This report was finalized on 06/01/2021 19:31 by  Dr Jhonatan Berry, .    XR Chest 1 View [[id number]] Collected: 06/01/21 1834     Updated: 06/01/21 1837    Narrative:      Frontal upright radiograph of the chest 6/1/2021 6:10 PM CDT     HISTORY: Central line     COMPARISON: 6/1/2021.       Impression:      FINDINGS/IMPRESSION:      Right IJ central line is in good position. Otherwise stable. No  pneumothorax.  This report was finalized on 06/01/2021 18:34 by Dr Jhonatan Berry, .    XR Chest 1 View [[id number]] Collected: 06/01/21 1715     Updated: 06/01/21 1720    Narrative:      Frontal upright radiograph of the chest 6/1/2021 5:12 PM CDT     HISTORY: Altered mental status     COMPARISON: Chest exam dated 5/15/2021.     FINDINGS:      Stable moderate to large layering right pleural effusion with continued  opacification of the right lung base. Left lung is clear. Heart size is  stable. The pulmonary vasculature are nondilated. No pneumothorax.  Partially imaged impacted fracture across the surgical neck of the right  proximal humerus.       Impression:      1. No significant interval change from the recent comparison exam. There  is a moderate to large layering right pleural effusion with  opacification of the right lung base.        This report was finalized on 06/01/2021 17:17 by Dr Jhonatan Berry, .          Culture:  No results found for: BLOODCX, URINECX, WOUNDCX, MRSACX, RESPCX, STOOLCX      Assessment   1.  End stage renal disease on HD TTS  2.  Metabolic encephalopathy  3.  Sepsis  4.  Type 2 diabetes with hypoglycemia  5.  Hypertension  6.  Anemia of CKD  7.  COPD    Plan:  1.  Stop IV fluids  2.  Dialysis next due on 6/03  3.  Monitor labs  4.  Further assessment and plan pending Dr Caballero's evaluation of patient      Thank you for the consult, we appreciate the opportunity to provide care to your patients.  Feel free to contact me if I can be of any further assistance.      Gomez Lemos, ANTHONY  6/2/2021  08:31 CDT    Electronically signed by Federico  "MD Jose at 06/02/21 1305     Vimal Jewell, PharmD at 06/02/21 8753          Pharmacy Dosing Service  Antimicrobial Dosing  Zosyn & Vancomycin    Assessment/Action/Plan:  Pharmacy to dose Zosyn and Vancomycin requests on dialysis patient for Sepsis. Initiated the following tentative dosing regimens until nephrology determines dialysis to be utilized while hospitalized.   Initiated renally adjusted extended-infusion dosing of, Zosyn 3.375 gm IV over 4 hours every 12 hours, following initial dose given in ER, for patient with CrCl < 20 ml/min. Current end date/final dose: 6/8/21 at 2100.  Initiated Vancomycin 750 mg IV every 48 hours, following a 1,250 mg dose given in ER. No levels ordered at this time. Current end date/final dose: 6/9/21 at 2100.  Pharmacy will continue to monitor daily and make further adjustment(s) accordingly.     Subjective:  Mini Gentile is a 61 y.o. female with a  \"Pharmacy to Dose Zosyn and Vancomycin\" consult for the treatment of Sepsis , day 2 of treatment.    Objective:  Ht: 139.7 cm (55\"); Wt: 65.1 kg (143 lb 9.6 oz)  Estimated Creatinine Clearance: 13.7 mL/min (A) (by C-G formula based on SCr of 3.42 mg/dL (H)).   Creatinine   Date Value Ref Range Status   06/01/2021 3.42 (H) 0.57 - 1.00 mg/dL Final   06/01/2021 3.30 (H) 0.57 - 1.00 mg/dL Final      Lab Results   Component Value Date    WBC 3.43 06/01/2021    WBC  06/01/2021      Comment:      Corrected results  Corrected result. Previous result was 2.70 10*3/mm3 on 6/1/2021 at 1735 CDT.      Baseline culture results:  Microbiology Results (last 10 days)       Procedure Component Value - Date/Time    COVID PRE-OP / PRE-PROCEDURE SCREENING ORDER (NO ISOLATION) - Swab, Nasal Cavity [[id number]]  (Normal) Collected: 05/24/21 1427    Lab Status: Final result Specimen: Swab from Nasal Cavity Updated: 05/24/21 2038    Narrative:      The following orders were created for panel order COVID PRE-OP / PRE-PROCEDURE SCREENING ORDER (NO " ISOLATION) - Swab, Nasal Cavity.  Procedure                               Abnormality         Status                     ---------                               -----------         ------                     COVID-19,APTIMA PANTHER,...[[id number]]  Normal              Final result                 Please view results for these tests on the individual orders.    COVID-19,APTIMA PANTHER,PAD IN-HOUSE,NP/OP/NASAL SWAB IN UTM/VTM/SALINE/LIQUID AMIES TRANSPORT MEDIA/NP WASH OR ASPIRATE, 24 HR TAT - Swab, Nasal Cavity [[id number]]  (Normal) Collected: 05/24/21 1427    Lab Status: Final result Specimen: Swab from Nasal Cavity Updated: 05/24/21 2038     COVID19 Not Detected    Narrative:      Fact sheet for providers: https://www.fda.gov/media/473376/download     Fact sheet for patients: https://www.fda.gov/media/228954/download    Test performed by RT PCR.            Vimal Jewell, Marisol  06/02/21 03:41 CDT      Electronically signed by Vimal Jewlel, PharmD at 06/02/21 0350

## 2021-06-02 NOTE — CASE MANAGEMENT/SOCIAL WORK
Discharge Planning Assessment  [facility]     Patient Name: Mini Gentile  MRN: [id number]  Today's Date: 6/2/2021    Admit Date: 6/1/2021    Discharge Needs Assessment     Row Name 06/02/21 1002       Living Environment    Lives With  significant other    Name(s) of Who Lives With Patient  Jose    Current Living Arrangements  home/apartment/condo    Primary Care Provided by  spouse/significant other    Provides Primary Care For  no one    Family Caregiver if Needed  significant other    Quality of Family Relationships  helpful;involved;supportive    Able to Return to Prior Arrangements  no       Transition Planning    Patient/Family Anticipates Transition to  long-term care facility    Patient/Family Anticipated Services at Transition      Transportation Anticipated  family or friend will provide       Discharge Needs Assessment    Readmission Within the Last 30 Days  previous discharge plan unsuccessful    Equipment Currently Used at Home  bath bench;walker, rolling;shower chair;wheelchair;cane, straight    Discharge Coordination/Progress  lives with Jose significant other who is her primary caregiver; has RX coverage PCP and PATs bus to dialysis 3x per week; will follow for DC needs        Discharge Plan    No documentation.       Continued Care and Services - Admitted Since 6/1/2021    Coordination has not been started for this encounter.     Selected Continued Care - Prior Encounters Includes selections from prior encounters from 3/3/2021 to 6/2/2021    Discharged on 4/8/2021 Admission date: 4/3/2021 - Discharge disposition: Home-Health Care [facility]    Home Medical Care     Service Provider Selected Services Address Phone Fax Patient Preferred    [facility] - [facility]  Home Health Services [address] [place] [address], [facility] [address] [phone] --                Discharged on 3/28/2021 Admission date: 3/26/2021 - Discharge disposition: Home-Health Care [facility]      Service Provider Selected Services Address Phone Fax Patient Preferred    [facility] - [facility]  Home Health Services [address], [facility] [address] [phone] --                      Demographic Summary    No documentation.       Functional Status    No documentation.       Psychosocial    No documentation.       Abuse/Neglect    No documentation.       Legal    No documentation.       Substance Abuse    No documentation.       Patient Forms    No documentation.           Kary Mansfield RN

## 2021-06-02 NOTE — OUTREACH NOTE
Medical Week 2 Survey      Responses   [facility] patient discharged from?  [facility]   Does the patient have one of the following disease processes/diagnoses(primary or secondary)?  Other   Week 2 attempt successful?  No   Unsuccessful attempts  Attempt 1   Revoke  Readmitted          Marlin Portillo RN

## 2021-06-02 NOTE — CASE MANAGEMENT/SOCIAL WORK
Continued Stay Note   Db     Patient Name: Mini Gentile  MRN: [id number]  Today's Date: 6/2/2021    Admit Date: 6/1/2021    Discharge Plan     Row Name 06/02/21 1321       Plan    Plan  Social service consult received for SNF placement.  Patient currently confused today.  Patient is her own decision maker.  Patient resides with her significant other, they are not legally  but he is her caregiver.  Will see if patient's mental status has improved in the morning to discuss placement.        Discharge Codes    No documentation.             STACI Ramirez

## 2021-06-02 NOTE — PLAN OF CARE
Goal Outcome Evaluation:      Patient oriented to person and  at times.  Disoriented to place, time and situation. Follows commands at times and is easily agitated.  Zavaleta catheter in place, light red urine with minimal output.      Admission completed with significant other via phone.      1L NC.  IV fluids stopped, Q4 POCT remains, remained in the 100's.  No coverage administered.     NPO, no oral pills administered.  Bilateral soft wrist restraints in place.

## 2021-06-02 NOTE — CONSULTS
Nephrology ([facility]) Consult Note      Patient:  Mini Gentile  YOB: 1960  Date of Service: 6/2/2021  MRN: [id number]   Acct: [id number]   Primary Care Physician: Bharati Dahl APRN  Advance Directive:   Code Status and Medical Interventions:   Ordered at: 06/02/21 0310     Code Status:    CPR     Medical Interventions (Level of Support Prior to Arrest):    Full     Admit Date: 6/1/2021       Hospital Day: 1  Referring Provider: Tejinder Kent MD      Patient personally seen and examined.  Complete chart including Consults, Notes, Operative Reports, Labs, Cardiology, and Radiology studies reviewed as able.        Subjective:  Mini Gentile is a 61 y.o. female  whom we were consulted for end stage renal disease. Maintenance hemodialysis on Tuesday Thursday Saturday at [facility]. Fairly noncompliant with dialysis treatments but did attend on 6/01.  Discharged two weeks ago from [facility] following fistulogram/venoplasty of left extremity on 5/13.  Presented this time with altered mental status. She was hypoglycemic, treated with V dextrose, and was also given Narcan which seemed to help improve her level of responsiveness. Currently is awake but confused. Unable to provide any meaningful history.      Allergies:  Bupropion, Chantix [varenicline], Gabapentin, Azithromycin, Levofloxacin, Penicillins, and Sulfa antibiotics    Home Meds:  Medications Prior to Admission   Medication Sig Dispense Refill Last Dose   • Ferric Citrate 1  MG(Fe) tablet Take  by mouth.      • metoclopramide (REGLAN) 5 MG tablet Take 5 mg by mouth 4 (Four) Times a Day Before Meals & at Bedtime.      • midodrine (PROAMATINE) 5 MG tablet Take 5 mg by mouth 3 (Three) Times a Day Before Meals.      • traMADol (ULTRAM) 50 MG tablet Take 50 mg by mouth Every 6 (Six) Hours As Needed for Moderate Pain .      • Travoprost (TRAVATAN OP) Apply  to eye(s) as directed by provider.       • albuterol sulfate  (90 Base) MCG/ACT inhaler Inhale 2 puffs Every 4 (Four) Hours As Needed for Wheezing. 18 g 0    • aspirin 81 MG EC tablet Take 81 mg by mouth Daily.      • atropine 1 % ophthalmic solution Administer 1 drop into the left eye Daily.      • brimonidine (ALPHAGAN) 0.2 % ophthalmic solution Administer 1 drop into the left eye 3 (Three) Times a Day.      • carvedilol (COREG) 6.25 MG tablet Take 6.25 mg by mouth 2 (Two) Times a Day With Meals.      • cholecalciferol (VITAMIN D3) 1000 units tablet Take 2,000 Units by mouth Daily.      • dorzolamide (TRUSOPT) 2 % ophthalmic solution Administer 1 drop into the left eye 2 (Two) Times a Day.      • famotidine (PEPCID) 20 MG tablet Take 20 mg by mouth 2 (Two) Times a Day.      • fluticasone (FLONASE) 50 MCG/ACT nasal spray 2 sprays into the nostril(s) as directed by provider 2 (Two) Times a Day.      • insulin glargine (LANTUS) 100 UNIT/ML injection Inject 10 Units under the skin Every Night.      • ipratropium-albuterol (DUO-NEB) 0.5-2.5 mg/3 ml nebulizer Take 3 mL by nebulization Every 4 (Four) Hours As Needed for Wheezing or Shortness of Air.      • lactobacillus acidophilus (RISAQUAD) capsule capsule Take 1 capsule by mouth Daily. 30 capsule 2    • levothyroxine (SYNTHROID, LEVOTHROID) 25 MCG tablet Take 1 tablet by mouth Daily. 30 tablet 0    • losartan (COZAAR) 50 MG tablet Take 50 mg by mouth 2 (Two) Times a Day.      • ondansetron (ZOFRAN) 4 MG tablet Take 1 tablet by mouth Every 6 (Six) Hours As Needed for Nausea or Vomiting. 24 tablet 2    • pantoprazole (PROTONIX) 40 MG EC tablet Take 1 tablet by mouth Daily. 30 tablet 1    • pravastatin (PRAVACHOL) 10 MG tablet Take 10 mg by mouth Every Night.      • pregabalin (LYRICA) 50 MG capsule Take 50 mg by mouth 2 (Two) Times a Day.      • sertraline (ZOLOFT) 25 MG tablet Take 25 mg by mouth Daily.      • sevelamer (RENVELA) 800 MG tablet Take 2,400 mg by mouth 3 (Three) Times a Day With Meals.       • timolol (TIMOPTIC) 0.5 % ophthalmic solution Administer 1 drop into the left eye Every Morning.          Medicines:  Current Facility-Administered Medications   Medication Dose Route Frequency Provider Last Rate Last Admin   • acetaminophen (TYLENOL) tablet 650 mg  650 mg Oral Q4H PRN Tejinder Kent MD        Or   • acetaminophen (TYLENOL) suppository 650 mg  650 mg Rectal Q4H PRN Tejinder Kent MD       • aspirin EC tablet 81 mg  81 mg Oral Daily Tejinder Kent MD       • atropine 1 % ophthalmic solution 1 drop  1 drop Left Eye Daily Tejinder Kent MD       • brimonidine (ALPHAGAN) 0.2 % ophthalmic solution 1 drop  1 drop Left Eye TID Tejinder Kent MD       • carvedilol (COREG) tablet 6.25 mg  6.25 mg Oral BID With Meals Tejinder Kent MD       • cholecalciferol (VITAMIN D3) tablet 2,000 Units  2,000 Units Oral Daily Tejinder Kent MD       • dextrose (D50W) 25 g/ 50mL Intravenous Solution 25 g  25 g Intravenous Q15 Min PRN Tejinder Kent MD       • dextrose (GLUTOSE) oral gel 15 g  15 g Oral Q15 Min PRN Tejinder Kent MD       • dorzolamide (TRUSOPT) 2 % ophthalmic solution 1 drop  1 drop Left Eye BID Tejinder Kent MD       • enoxaparin (LOVENOX) syringe 30 mg  30 mg Subcutaneous Q24H Tejinder Kent MD       • famotidine (PEPCID) tablet 20 mg  20 mg Oral BID Tejinder Kent MD       • Ferric Citrate tablet 840 mg  4 tablet Oral TID With Meals Tejinder Kent MD       • fluticasone (FLONASE) 50 MCG/ACT nasal spray 2 spray  2 spray Nasal BID Tejinder Kent MD       • glucagon (human recombinant) (GLUCAGEN DIAGNOSTIC) injection 1 mg  1 mg Subcutaneous Q15 Min PRN Tejinder Kent MD       • insulin lispro (humaLOG) injection 2-7 Units  2-7 Units Subcutaneous TID AC Tejinder Kent MD       • ipratropium-albuterol (DUO-NEB) nebulizer solution 3 mL  3 mL Nebulization Q4H PRN Tejinder Kent MD       • lactobacillus acidophilus (RISAQUAD) capsule 1  capsule  1 capsule Oral Daily Tejinder Kent MD       • losartan (COZAAR) tablet 50 mg  50 mg Oral BID Tejinder Kent MD       • metoclopramide (REGLAN) tablet 5 mg  5 mg Oral 4x Daily AC & at Bedtime Tejinder Kent MD       • midodrine (PROAMATINE) tablet 5 mg  5 mg Oral TID AC Tejinder Kent MD       • ondansetron (ZOFRAN) tablet 4 mg  4 mg Oral Q6H PRN Tejinder Kent MD       • Pharmacy to Dose Zosyn   Does not apply Continuous PRN Tejinder Kent MD       • piperacillin-tazobactam (ZOSYN) 3.375 g in iso-osmotic dextrose 50 ml (premix)  3.375 g Intravenous Q12H Tejinder Kent MD       • pravastatin (PRAVACHOL) tablet 10 mg  10 mg Oral Nightly Tejinder Kent MD       • sertraline (ZOLOFT) tablet 25 mg  25 mg Oral Daily Tejinder Kent MD       • sodium chloride 0.9 % flush 10 mL  10 mL Intravenous Q12H Tejinder Kent MD       • sodium chloride 0.9 % flush 10 mL  10 mL Intravenous PRN Tejinder Kent MD       • timolol (TIMOPTIC) 0.5 % ophthalmic solution 1 drop  1 drop Left Eye Daily Tejinder Kent MD       • [START ON 6/3/2021] vancomycin 750 mg/250 mL 0.9% NS IVPB ([facility])  750 mg Intravenous Q48H Tejinder Kent MD           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    • Chronic otitis media    • Diabetes (CMS/[facility])    • End stage renal disease on dialysis (CMS/[facility])    • Eustachian tube dysfunction    • GERD (gastroesophageal reflux disease)    • Glaucoma    • HTN (hypertension)    • Hyperlipidemia    • Mucoid otitis media    • Noncompliance of patient with renal dialysis (CMS/[facility])    • Tobacco abuse        Past Surgical History:  Past Surgical History:   Procedure Laterality Date   • ARTERIOVENOUS FISTULA     • CATARACT EXTRACTION WITH INTRAOCULAR LENS IMPLANT     •  SECTION     • CHOLECYSTECTOMY     • MYRINGOTOMY W/ TUBES Bilateral    • MYRINGOTOMY W/ TUBES Right    • TUBAL ABDOMINAL  "LIGATION         Family History  Family History   Problem Relation Age of Onset   • Heart disease Mother    • Diabetes Father    • Colon cancer Neg Hx    • Colon polyps Neg Hx        Social History  Social History     Socioeconomic History   • Marital status: Single     Spouse name: Not on file   • Number of children: Not on file   • Years of education: Not on file   • Highest education level: Not on file   Tobacco Use   • Smoking status: Current Every Day Smoker     Packs/day: 2.00     Years: 6.00     Pack years: 12.00     Types: Cigarettes   • Smokeless tobacco: Never Used   Vaping Use   • Vaping Use: Never used   Substance and Sexual Activity   • Alcohol use: No   • Drug use: No   • Sexual activity: Defer         Review of Systems:   unable to obtain due to altered mental status    Objective:  Patient Vitals for the past 24 hrs:   BP Temp Temp src Pulse Resp SpO2 Height Weight   06/02/21 0709 139/87 -- -- -- -- -- -- --   06/02/21 0700 -- -- -- 80 13 96 % -- --   06/02/21 0605 (!) 142/108 -- -- 85 19 100 % -- 65.1 kg (143 lb 9.6 oz)   06/02/21 0515 (!) 152/107 -- -- 86 20 100 % -- --   06/02/21 0400 108/50 96.9 °F (36.1 °C) Axillary 68 13 100 % -- --   06/02/21 0300 96/51 -- -- 68 15 98 % -- --   06/02/21 0200 109/49 -- -- 72 19 97 % -- --   06/02/21 0100 100/47 -- -- 72 17 98 % -- --   06/02/21 0000 104/46 96.4 °F (35.8 °C) Axillary 74 17 97 % -- --   06/01/21 2300 145/84 -- -- 80 16 96 % -- --   06/01/21 2230 163/91 95 °F (35 °C) Rectal -- 20 94 % 139.7 cm (55\") 65.1 kg (143 lb 9.6 oz)   06/01/21 2115 159/96 -- -- 89 -- 94 % -- --   06/01/21 2100 (!) 175/154 -- -- 90 -- 93 % -- --   06/01/21 2045 (!) 180/155 -- -- 89 -- 93 % -- --   06/01/21 2040 -- 95.2 °F (35.1 °C) Rectal -- -- -- -- --   06/01/21 2030 (!) 164/135 -- -- 91 -- 94 % -- --   06/01/21 2015 (!) 143/102 -- -- 91 -- 94 % -- --   06/01/21 2000 (!) 165/124 -- -- 92 -- -- -- --   06/01/21 1945 161/80 -- -- 94 -- -- -- --   06/01/21 1930 147/87 -- -- 94 " "-- 96 % -- --   06/01/21 1915 -- -- -- 94 -- 93 % -- --   06/01/21 1830 (!) 191/84 -- -- 93 -- 96 % -- --   06/01/21 1815 (!) 164/103 -- -- 87 -- 95 % -- --   06/01/21 1800 -- -- -- -- -- 93 % -- --   06/01/21 1745 -- -- -- -- -- -- 147.3 cm (58\") --   06/01/21 1745 -- -- -- 88 -- 93 % -- --   06/01/21 1730 148/94 -- -- 87 -- 93 % -- --   06/01/21 1715 (!) 81/50 -- -- 85 -- 93 % -- --   06/01/21 1700 167/77 -- -- 82 -- 94 % -- --   06/01/21 1645 172/63 -- -- 78 -- 91 % -- --   06/01/21 1636 -- 97.5 °F (36.4 °C) Axillary -- -- -- -- --   06/01/21 1635 (!) 87/70 -- -- -- -- -- -- --   06/01/21 1633 -- -- Oral 78 10 96 % -- --   06/01/21 1629 -- -- -- -- -- -- 152.4 cm (60\") 64.2 kg (141 lb 8.6 oz)       Intake/Output Summary (Last 24 hours) at 6/2/2021 0831  Last data filed at 6/2/2021 0400  Gross per 24 hour   Intake 668 ml   Output 50 ml   Net 618 ml     General: oriented X 1   Neck: supple, no JVD  Chest:  clear to auscultation bilaterally without respiratory distress  CVS: regular rate and rhythm  Abdominal: soft, nontender, positive bowel sounds  Extremities: no cyanosis or edema  Skin: warm and dry without rash  Neuro: no focal motor deficits    Labs:  Results from last 7 days   Lab Units 06/02/21  0318 06/01/21  1759   WBC 10*3/mm3 2.93* 3.43   HEMOGLOBIN g/dL 9.1* 10.1*   HEMATOCRIT % 28.3* 31.8*   PLATELETS 10*3/mm3 175 273         Results from last 7 days   Lab Units 06/02/21 0318 06/01/21 1910 06/01/21  1759   SODIUM mmol/L 131* 131* 133*   POTASSIUM mmol/L 3.6 3.6 3.7   CHLORIDE mmol/L 92* 89* 91*   CO2 mmol/L 29.0 26.0 28.0   BUN mg/dL 25* 24* 23   CREATININE mg/dL 3.90* 3.42* 3.30*   CALCIUM mg/dL 8.3* 9.1 9.1   BILIRUBIN mg/dL 0.3 0.4 0.4   ALK PHOS U/L 206* 260* 263*   ALT (SGPT) U/L 14 17 17   AST (SGOT) U/L 17 21 20   GLUCOSE mg/dL 200* 144* 126*       Radiology:   Imaging Results (Last 72 Hours)     Procedure Component Value Units Date/Time    CT Abdomen Pelvis Without Contrast [[id number]] " Collected: 06/01/21 1952     Updated: 06/01/21 1957    Narrative:      CT ABDOMEN PELVIS WO CONTRAST- 6/1/2021 6:51 PM CDT     HISTORY: AMS; R41.82-Altered mental status, unspecified;  E16.2-Hypoglycemia, unspecified      COMPARISON: Chest CT dated 6/1/2021      DOSE LENGTH PRODUCT: 709 mGy cm. Automated exposure control was also  utilized to decrease patient radiation dose.     TECHNIQUE: Noncontrast enhanced images of the abdomen and pelvis  obtained without oral contrast. Multiplanar reformatted images were  provided for review.      FINDINGS:      LIVER: No focal liver lesion within limits of a noncontrast study.      BILIARY SYSTEM: The gallbladder is surgically absent. No intrahepatic or  extrahepatic ductal dilatation.      PANCREAS: No focal pancreatic lesion within limits of a noncontrast  study.      SPLEEN: Unremarkable.      KIDNEYS: Bilateral renal atrophy. No hydronephrosis. The ureters are  decompressed and normal in appearance.     ADRENALS: Unremarkable.     RETROPERITONEUM: No mass, lymphadenopathy or hemorrhage.      GI TRACT: The stomach and small bowel are unremarkable. Moderate colonic  stool. No inflammatory changes along the bowel.      OTHER: Bilateral upper thigh and diffuse body wall subcutaneous edema  reflecting systemic volume overload. Trace ascites which I suspect is  also related to the systemic volume overload. No acute bony abnormality.     PELVIS: No mass lesion, fluid collection or significant lymphadenopathy  is seen in the pelvis. The urinary bladder is normal in appearance.       Impression:      1. Anasarca. Otherwise, no acute process involving the abdomen or  pelvis.  This report was finalized on 06/01/2021 19:54 by Dr Jhonatan Berry, .    CT Chest Without Contrast Diagnostic [[id number]] Collected: 06/01/21 1948     Updated: 06/01/21 1955    Narrative:      CT CHEST WO CONTRAST DIAGNOSTIC- 6/1/2021 6:51 PM CDT     HISTORY: AMS; R41.82-Altered mental status,  unspecified;  E16.2-Hypoglycemia, unspecified     COMPARISON: CT scan dated 1/19/2020     DOSE LENGTH PRODUCT: 257 mGy cm. Automated exposure control was also  utilized to decrease patient radiation dose.     TECHNIQUE: Serial helical tomographic images of the chest were acquired  without contrast. Multiplanar reformatted images were provided for  review.     FINDINGS:     Visualized neck base: The imaged portion of the base of the neck appears  unremarkable.      Lungs: Right middle and right lower lobe airways are completely  opacified with debris and there is atelectasis of both of these lobes.  Interlobular septal thickening and peribronchial thickening with  bilateral groundglass opacities, reflecting pulmonary edema. There is a  moderate to large pleural effusion on the right. 7 mm left upper lobe  pulmonary nodule (series 3-image 47).     Heart: Prominent four-chamber cardiomegaly. There is no pericardial  effusion. Advanced coronary artery atheromatous calcification.     Vasculature: There is calcified atheromatous plaque in the aorta without  aneurysmal dilation. The pulmonary arteries are enlarged, suggestive of  pulmonary arterial hypertension.     Mediastinum and lymph nodes: No suspicious hilar or mediastinal  adenopathy..     Skeletal and soft tissues: Diffuse chest wall edema. Comminuted  fracturing of the right proximal humerus.       Impression:      1. Volume overload with interstitial and casey pulmonary edema. There is  also a moderate to large right pleural effusion.  2. There is debris diffusely opacifying the right middle and right lower  lobe airways with atelectasis of both of these lobes, suspected mucous  plugging or aspirated contents.  3. Comminuted fracturing of the right proximal humerus.  This report was finalized on 06/01/2021 19:52 by Dr Jhonatan Berry, .    CT Head Without Contrast [[id number]] Collected: 06/01/21 1930     Updated: 06/01/21 1934    Narrative:      CT HEAD WO  CONTRAST- 6/1/2021 6:51 PM CDT     HISTORY: Mental status change, unknown cause; R41.82-Altered mental  status, unspecified; E16.2-Hypoglycemia, unspecified       DOSE LENGTH PRODUCT: 668 mGy cm. Automated exposure control was also  utilized to decrease patient radiation dose.     Technique:   Axial CT of the brain without IV contrast. Sagittal and coronal  reformations are also provided for review. Soft tissue and bone kernels  are available for interpretation.     Comparison: CT scan dated 4/3/2021.     Findings:      There is no evidence of acute large vascular territory infarct. No  intra-axial or extra-axial hemorrhage. No visualized mass lesion or mass  effect. The ventricles, cortical sulci and basal cisterns are symmetric  and age appropriate.  Posterior fossa structures are unremarkable. The  scalp and calvarium are intact. Visualized paranasal sinuses and  mastoids are clear.        Impression:      Impression:    1. No acute intracranial process. Stable exam.  This report was finalized on 06/01/2021 19:31 by Dr Jhonatan Berry, .    XR Chest 1 View [[id number]] Collected: 06/01/21 1834     Updated: 06/01/21 1837    Narrative:      Frontal upright radiograph of the chest 6/1/2021 6:10 PM CDT     HISTORY: Central line     COMPARISON: 6/1/2021.       Impression:      FINDINGS/IMPRESSION:      Right IJ central line is in good position. Otherwise stable. No  pneumothorax.  This report was finalized on 06/01/2021 18:34 by Dr Jhonatan Berry, .    XR Chest 1 View [[id number]] Collected: 06/01/21 1715     Updated: 06/01/21 1720    Narrative:      Frontal upright radiograph of the chest 6/1/2021 5:12 PM CDT     HISTORY: Altered mental status     COMPARISON: Chest exam dated 5/15/2021.     FINDINGS:      Stable moderate to large layering right pleural effusion with continued  opacification of the right lung base. Left lung is clear. Heart size is  stable. The pulmonary vasculature are nondilated. No  pneumothorax.  Partially imaged impacted fracture across the surgical neck of the right  proximal humerus.       Impression:      1. No significant interval change from the recent comparison exam. There  is a moderate to large layering right pleural effusion with  opacification of the right lung base.        This report was finalized on 06/01/2021 17:17 by Dr Jhonatan Berry, .          Culture:  No results found for: BLOODCX, URINECX, WOUNDCX, MRSACX, RESPCX, STOOLCX      Assessment   1.  End stage renal disease on HD TTS  2.  Metabolic encephalopathy  3.  Sepsis  4.  Type 2 diabetes with hypoglycemia  5.  Hypertension  6.  Anemia of CKD  7.  COPD    Plan:  1.  Stop IV fluids  2.  Dialysis next due on 6/03  3.  Monitor labs  4.  Further assessment and plan pending Dr Caabllero's evaluation of patient      Thank you for the consult, we appreciate the opportunity to provide care to your patients.  Feel free to contact me if I can be of any further assistance.      Gomez Lemos, APRN  6/2/2021  08:31 CDT

## 2021-06-02 NOTE — H&P
.      [facility] Medicine Services  HISTORY AND PHYSICAL    Date of Admission: 6/1/2021  Primary Care Physician: Bharati Dahl APRN    Subjective     Chief Complaint: Confusion    History of Present Illness  Patient is a 58-year-old white female history of end-stage renal disease on Tuesday Thursday Saturday dialysis for which she is often noncompliant.  She was at dialysis today when she started becoming somewhat confused the group there thought she was hypoglycemic and gave her glucose which improved some but she continued to have issues and was sent to the emergency room for further evaluation.  In the ER she continued to be hypoglycemic despite D50 and confused.  She was given more boluses of D50 and finally placed on a D10 drip as well as given Narcan which did show a little bit of improvement but not enough to correct her situation.  She was ultimately placed on a Narcan drip and a central line was placed.  Her urine drug screen was only positive for oxycodone which she does have a prescription for as well as for Lyrica these.  The only medicines she is on that can cause confusion at least on her med list.  CT scans of her head abdomen and pelvis as well as chest are unremarkable with the exception of chest CT which shows aspiration in the right lower and middle lobes versus mucous plugging.  They also show evidence of volume overload and anasarca.  Her initial labs were all hemolyzed apparently so they had to be repeated show a creatinine of 3.3 which would expect after dialysis electrolytes were unremarkable white count is low at 3.43 with a left shift previous white count was 6.  She is afebrile and apparently her blood pressure was somewhat labile in the emergency room it was initially thought her sodium was 117 but it turned out to be 133.  Uncertain if this is medication effect or uremia less likely since she just dialyzed or infectious process.  Unable to get any  history out of the patient she will be then to the ICU for closer monitoring we will culture blood and sputum as well empiric antibiotics for now until he can better get a story from her.        Review of Systems   Unable to obtain patient is encephalopathic  Otherwise complete ROS reviewed and negative except as mentioned in the HPI.    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    • Chronic otitis media    • Diabetes (CMS/[facility])    • End stage renal disease on dialysis (CMS/[facility]ly [facility])    • Eustachian tube dysfunction    • GERD (gastroesophageal reflux disease)    • Glaucoma    • HTN (hypertension)    • Hyperlipidemia    • Mucoid otitis media    • Noncompliance of patient with renal dialysis (CMS/[facility])    • Tobacco abuse      Past Surgical History:  Past Surgical History:   Procedure Laterality Date   • ARTERIOVENOUS FISTULA     • CATARACT EXTRACTION WITH INTRAOCULAR LENS IMPLANT     •  SECTION     • CHOLECYSTECTOMY     • MYRINGOTOMY W/ TUBES Bilateral    • MYRINGOTOMY W/ TUBES Right    • TUBAL ABDOMINAL LIGATION       Social History:  reports that she has been smoking cigarettes. She has a 12.00 pack-year smoking history. She has never used smokeless tobacco. She reports that she does not drink alcohol and does not use drugs.    Family History: family history includes Diabetes in her father; Heart disease in her mother.       Allergies:  Allergies   Allergen Reactions   • Bupropion Nausea Only   • Chantix [Varenicline] Other (See Comments)     Does not remember   • Gabapentin Hallucinations     hallucinations   • Azithromycin Rash   • Levofloxacin Rash   • Penicillins Rash   • Sulfa Antibiotics Rash       Medications:  Prior to Admission medications    Medication Sig Start Date End Date Taking? Authorizing Provider   Ferric Citrate 1  MG(Fe) tablet Take  by mouth.   Yes Provider, MD Steve   metoclopramide (REGLAN) 5 MG tablet Take 5  mg by mouth 4 (Four) Times a Day Before Meals & at Bedtime.   Yes Steve Warren MD   midodrine (PROAMATINE) 5 MG tablet Take 5 mg by mouth 3 (Three) Times a Day Before Meals.   Yes Steve Warren MD   traMADol (ULTRAM) 50 MG tablet Take 50 mg by mouth Every 6 (Six) Hours As Needed for Moderate Pain .   Yes Stvee Warren MD   Travoprost (TRAVATAN OP) Apply  to eye(s) as directed by provider.   Yes Steve Warren MD   albuterol sulfate  (90 Base) MCG/ACT inhaler Inhale 2 puffs Every 4 (Four) Hours As Needed for Wheezing. 1/21/20   Sundeep Aldridge MD   aspirin 81 MG EC tablet Take 81 mg by mouth Daily.    Steve Warren MD   atropine 1 % ophthalmic solution Administer 1 drop into the left eye Daily.    Steve Warren MD   brimonidine (ALPHAGAN) 0.2 % ophthalmic solution Administer 1 drop into the left eye 3 (Three) Times a Day.    Steve Warren MD   carvedilol (COREG) 6.25 MG tablet Take 6.25 mg by mouth 2 (Two) Times a Day With Meals.    Steve Warren MD   cholecalciferol (VITAMIN D3) 1000 units tablet Take 2,000 Units by mouth Daily.    Steve Warren MD   dorzolamide (TRUSOPT) 2 % ophthalmic solution Administer 1 drop into the left eye 2 (Two) Times a Day.    Steve Warren MD   famotidine (PEPCID) 20 MG tablet Take 20 mg by mouth 2 (Two) Times a Day.    Steve Warren MD   fluticasone (FLONASE) 50 MCG/ACT nasal spray 2 sprays into the nostril(s) as directed by provider 2 (Two) Times a Day.    Steve Warren MD   insulin glargine (LANTUS) 100 UNIT/ML injection Inject 10 Units under the skin Every Night.    Steve Warren MD   ipratropium-albuterol (DUO-NEB) 0.5-2.5 mg/3 ml nebulizer Take 3 mL by nebulization Every 4 (Four) Hours As Needed for Wheezing or Shortness of Air.    Steve Warren MD   lactobacillus acidophilus (RISAQUAD) capsule capsule Take 1 capsule by mouth Daily. 5/3/20   Lyndon Lockett  "MD CHOCO   levothyroxine (SYNTHROID, LEVOTHROID) 25 MCG tablet Take 1 tablet by mouth Daily. 3/28/21   Roxie Schneider APRN   losartan (COZAAR) 50 MG tablet Take 50 mg by mouth 2 (Two) Times a Day.    ProviderSteve MD   ondansetron (ZOFRAN) 4 MG tablet Take 1 tablet by mouth Every 6 (Six) Hours As Needed for Nausea or Vomiting. 4/29/20   Lyndon Lockett MD   pantoprazole (PROTONIX) 40 MG EC tablet Take 1 tablet by mouth Daily. 4/8/21   Fuentes Cartagena DO   pravastatin (PRAVACHOL) 10 MG tablet Take 10 mg by mouth Every Night.    Steve Warren MD   pregabalin (LYRICA) 50 MG capsule Take 50 mg by mouth 2 (Two) Times a Day.    Steve Warren MD   sertraline (ZOLOFT) 25 MG tablet Take 25 mg by mouth Daily.    Steve Warren MD   sevelamer (RENVELA) 800 MG tablet Take 2,400 mg by mouth 3 (Three) Times a Day With Meals.    Steve Warren MD   timolol (TIMOPTIC) 0.5 % ophthalmic solution Administer 1 drop into the left eye Every Morning.    Steve Warren MD     I have utilized all available immediate resources to obtain, update, and review the patient's current medications.    Objective     Vital Signs: BP (!) 142/108   Pulse 80   Temp 96.9 °F (36.1 °C) (Axillary)   Resp 13   Ht 139.7 cm (55\")   Wt 65.1 kg (143 lb 9.6 oz)   SpO2 96%   BMI 33.38 kg/m²   Physical Exam   Gen.:  Well-nourished well-developed white female agitated at times  HEENT: Atraumatic, normocephalic.  Pupils equal, round, and reactive to light. Extraocular movements intact.  Sclerae anicteric.  External ears negative.  Mucous membranes moist.  Neck is supple without lymphadenopathy.  No JVD is noted.  No carotid bruits are auscultated.  Oropharynx is without erythema or exudate.   Chest: Coarse breath sounds deloris.  CV: Regular rate and rhythm.  Normal S1-S2.  No gallops, murmurs. or rubs.  Abdomen: Soft, nontender, nondistended.  Active bowel sounds,  No hepatosplenomegaly.  No masses.  No " hernias.  Extremities: No clubbing, edema, or cyanosis.  Capillary refill is normal.  Pulses are 2+ and symmetric at radial and dorsalis pedis.  Posterior tibial pulses are intact and 2+ palpable.  Neuro: Patient is awake, alert, and oriented ×0.  Cranial nerves II through XII are grossly intact.  Motor is 5 out of 5 bilaterally.  DTRs are 2+ and symmetric bilaterally. Sensory exam is nonfocal  Skin: Warm, dry, and intact.  No evidence of breakdown.  Sensorium: Cephalopathic    Nursing notes and vital signs reviewed        Results Reviewed:  Lab Results (last 24 hours)     Procedure Component Value Units Date/Time    POC Glucose Once [[id number]]  (Abnormal) Collected: 06/02/21 0407    Specimen: Blood Updated: 06/02/21 0418     Glucose 198 mg/dL      Comment: : RONNA Brian Sarah ID: [id number]       Comprehensive Metabolic Panel [[id number]]  (Abnormal) Collected: 06/02/21 0318    Specimen: Blood Updated: 06/02/21 0404     Glucose 200 mg/dL      BUN 25 mg/dL      Creatinine 3.90 mg/dL      Sodium 131 mmol/L      Potassium 3.6 mmol/L      Chloride 92 mmol/L      CO2 29.0 mmol/L      Calcium 8.3 mg/dL      Total Protein 5.2 g/dL      Albumin 2.90 g/dL      ALT (SGPT) 14 U/L      AST (SGOT) 17 U/L      Alkaline Phosphatase 206 U/L      Total Bilirubin 0.3 mg/dL      eGFR Non African Amer 12 mL/min/1.73      Comment: <15 Indicative of kidney failure.        eGFR   Amer --     Comment: <15 Indicative of kidney failure.        Globulin 2.3 gm/dL      A/G Ratio 1.3 g/dL      BUN/Creatinine Ratio 6.4     Anion Gap 10.0 mmol/L     Narrative:      GFR Normal >60  Chronic Kidney Disease <60  Kidney Failure <15      Phosphorus [[id number]]  (Abnormal) Collected: 06/02/21 0318    Specimen: Blood Updated: 06/02/21 0404     Phosphorus 4.8 mg/dL     Magnesium [[id number]]  (Normal) Collected: 06/02/21 0318    Specimen: Blood Updated: 06/02/21 0404     Magnesium 1.8 mg/dL     CBC Auto Differential [[id number]]   (Abnormal) Collected: 06/02/21 0318    Specimen: Blood Updated: 06/02/21 0358     WBC 2.93 10*3/mm3      RBC 2.90 10*6/mm3      Hemoglobin 9.1 g/dL      Hematocrit 28.3 %      MCV 97.6 fL      MCH 31.4 pg      MCHC 32.2 g/dL      RDW 17.5 %      RDW-SD 62.0 fl      MPV 9.1 fL      Platelets 175 10*3/mm3      Neutrophil % 74.1 %      Lymphocyte % 10.9 %      Monocyte % 12.3 %      Eosinophil % 1.7 %      Basophil % 0.3 %      Neutrophils, Absolute 2.17 10*3/mm3      Lymphocytes, Absolute 0.32 10*3/mm3      Monocytes, Absolute 0.36 10*3/mm3      Eosinophils, Absolute 0.05 10*3/mm3      Basophils, Absolute 0.01 10*3/mm3     Hemoglobin A1c [[id number]]  (Abnormal) Collected: 06/01/21 1759    Specimen: Blood Updated: 06/02/21 0331     Hemoglobin A1C 5.70 %     Narrative:      Hemoglobin A1C Ranges:    Increased Risk for Diabetes  5.7% to 6.4%  Diabetes                     >= 6.5%  Diabetic Goal                < 7.0%    POC Glucose Once [[id number]]  (Abnormal) Collected: 06/02/21 0218    Specimen: Blood Updated: 06/02/21 0229     Glucose 194 mg/dL      Comment: : RONNA Smith ID: [id number]       POC Glucose Once [[id number]]  (Abnormal) Collected: 06/01/21 2247    Specimen: Blood Updated: 06/01/21 2258     Glucose 179 mg/dL      Comment: : RONNA Smith ID: [id number]       POC Glucose Once [[id number]]  (Abnormal) Collected: 06/01/21 2135    Specimen: Blood Updated: 06/01/21 2146     Glucose 158 mg/dL      Comment: : [id number] Reynaga justinMeter ID: [id number]       [facility] [[id number]] Collected: 06/01/21 1642    Specimen: Blood Updated: 06/01/21 2045    Narrative:      The following orders were created for panel order [facility].  Procedure                               Abnormality         Status                     ---------                               -----------         ------                     Light Blue Top[[id number]]                                    "Edited Result - FINAL      Green Top (Gel)[[id number]]                                  Edited Result - FINAL      Lavender Top[[id number]]                                     Final result               Red Top[[id number]]                                          Final result               Sanchez Top[[id number]]                                         Final result                 Please view results for these tests on the individual orders.    Sanchez Top [[id number]] Collected: 06/01/21 1642    Specimen: Blood Updated: 06/01/21 2045     Extra Tube Hold for add-ons.     Comment: Auto resulted.       Procalcitonin [[id number]]  (Abnormal) Collected: 06/01/21 1759    Specimen: Blood Updated: 06/01/21 1948     Procalcitonin 0.34 ng/mL     Narrative:      As a Marker for Sepsis (Non-Neonates):     1. <0.5 ng/mL represents a low risk of severe sepsis and/or septic shock.  2. >2 ng/mL represents a high risk of severe sepsis and/or septic shock.    As a Marker for Lower Respiratory Tract Infections that require antibiotic therapy:  PCT on Admission     Antibiotic Therapy             6-12 Hrs later  >0.5                          Strongly Recommended            >0.25 - <0.5             Recommended  0.1 - 0.25                  Discouraged                       Remeasure/reassess PCT  <0.1                         Strongly Discouraged         Remeasure/reassess PCT      As 28 day mortality risk marker: \"Change in Procalcitonin Result\" (>80% or <=80%) if Day 0 (or Day 1) and Day 4 values are available. Refer to http://www.[employer]-pct-calculator.com/    Change in PCT <=80 %   A decrease of PCT levels below or equal to 80% defines a positive change in PCT test result representing a higher risk for 28-day all-cause mortality of patients diagnosed with severe sepsis or septic shock.    Change in PCT >80 %   A decrease of PCT levels of more than 80% defines a negative change in PCT result representing a lower risk for 28-day all-cause mortality " of patients diagnosed with severe sepsis or septic shock.                Urine Drug Screen - Urine, Clean Catch [[id number]]  (Abnormal) Collected: 06/01/21 1925    Specimen: Urine, Clean Catch Updated: 06/01/21 1945     THC, Screen, Urine Negative     Phencyclidine (PCP), Urine Negative     Cocaine Screen, Urine Negative     Methamphetamine, Ur Negative     Opiate Screen Negative     Amphetamine Screen, Urine Negative     Benzodiazepine Screen, Urine Negative     Tricyclic Antidepressants Screen Negative     Methadone Screen, Urine Negative     Barbiturates Screen, Urine Negative     Oxycodone Screen, Urine Positive     Propoxyphene Screen Negative     Buprenorphine, Screen, Urine Negative    Narrative:      Cutoff For Drugs Screened:    Amphetamines               500 ng/ml  Barbiturates               200 ng/ml  Benzodiazepines            150 ng/ml  Cocaine                    150 ng/ml  Methadone                  200 ng/ml  Opiates                    100 ng/ml  Phencyclidine               25 ng/ml  THC                            50 ng/ml  Methamphetamine            500 ng/ml  Tricyclic Antidepressants  300 ng/ml  Oxycodone                  100 ng/ml  Propoxyphene               300 ng/ml  Buprenorphine               10 ng/ml    The normal value for all drugs tested is negative. This report includes unconfirmed screening results, with the cutoff values listed, to be used for medical treatment purposes only.  Unconfirmed results must not be used for non-medical purposes such as employment or legal testing.  Clinical consideration should be applied to any drug of abuse test, particularly when unconfirmed results are used.      Urinalysis, Microscopic Only - Urine, Catheter [[id number]]  (Abnormal) Collected: 06/01/21 1925    Specimen: Urine, Catheter Updated: 06/01/21 1944     RBC, UA 0-2 /HPF      WBC, UA 3-5 /HPF      Bacteria, UA None Seen /HPF      Squamous Epithelial Cells, UA 0-2 /HPF      Hyaline Casts, UA 0-2  /LPF      Methodology Automated Microscopy    Urinalysis With Culture If Indicated - Urine, Catheter [[id number]]  (Abnormal) Collected: 06/01/21 1925    Specimen: Urine, Catheter Updated: 06/01/21 1944     Color, UA Dark Yellow     Appearance, UA Clear     pH, UA 5.5     Specific Gravity, UA 1.018     Glucose, UA Negative     Ketones, UA Negative     Bilirubin, UA Negative     Blood, UA Trace     Protein, UA >=300 mg/dL (3+)     Leuk Esterase, UA Small (1+)     Nitrite, UA Negative     Urobilinogen, UA 1.0 E.U./dL    Comprehensive Metabolic Panel [[id number]]  (Abnormal) Collected: 06/01/21 1910    Specimen: Blood Updated: 06/01/21 1936     Glucose 144 mg/dL      BUN 24 mg/dL      Creatinine 3.42 mg/dL      Sodium 131 mmol/L      Potassium 3.6 mmol/L      Chloride 89 mmol/L      CO2 26.0 mmol/L      Calcium 9.1 mg/dL      Total Protein 6.7 g/dL      Albumin 3.70 g/dL      ALT (SGPT) 17 U/L      AST (SGOT) 21 U/L      Alkaline Phosphatase 260 U/L      Total Bilirubin 0.4 mg/dL      eGFR Non African Amer 14 mL/min/1.73      Comment: <15 Indicative of kidney failure.        eGFR   Amer --     Comment: <15 Indicative of kidney failure.        Globulin 3.0 gm/dL      A/G Ratio 1.2 g/dL      BUN/Creatinine Ratio 7.0     Anion Gap 16.0 mmol/L     Narrative:      GFR Normal >60  Chronic Kidney Disease <60  Kidney Failure <15      Ammonia [[id number]]  (Normal) Collected: 06/01/21 1910    Specimen: Blood Updated: 06/01/21 1936     Ammonia 17 umol/L     Phosphorus [[id number]]  (Normal) Collected: 06/01/21 1759    Specimen: Blood Updated: 06/01/21 1935     Phosphorus 4.2 mg/dL     Ethanol [[id number]] Collected: 06/01/21 1759    Specimen: Blood Updated: 06/01/21 1933     Ethanol % <0.010 %     Narrative:      Not for legal purposes. Chain of Custody not followed.     Lactic Acid, Plasma [[id number]]  (Normal) Collected: 06/01/21 1911    Specimen: Blood Updated: 06/01/21 1932     Lactate 1.0 mmol/L     Magnesium [[id number]]   (Normal) Collected: 06/01/21 1759    Specimen: Blood Updated: 06/01/21 1932     Magnesium 1.9 mg/dL     aPTT [[id number]]  (Abnormal) Collected: 06/01/21 1910    Specimen: Blood Updated: 06/01/21 1928     PTT 49.2 seconds     Protime-INR [[id number]]  (Abnormal) Collected: 06/01/21 1910    Specimen: Blood Updated: 06/01/21 1928     Protime 14.2 Seconds      INR 1.19    Lactic Acid, Plasma [[id number]] Collected: 06/01/21 1642    Specimen: Blood Updated: 06/01/21 1853     Lactate --     Comment: Corrected results  Corrected result. Previous result was 0.7 mmol/L on 6/1/2021 at 1710 CDT.       Magnesium [[id number]] Collected: 06/01/21 1642    Specimen: Blood Updated: 06/01/21 1853     Magnesium --     Comment: Corrected results  Corrected result. Previous result was 1.6 mg/dL on 6/1/2021 at 1708 CDT.       Ethanol [[id number]] Collected: 06/01/21 1642    Specimen: Blood Updated: 06/01/21 1853     Ethanol % --     Comment: Corrected results  Corrected result. Previous result was <0.010 % on 6/1/2021 at 1709 CDT.       Narrative:      Not for legal purposes. Chain of Custody not followed.     Procalcitonin [[id number]] Collected: 06/01/21 1642    Specimen: Blood Updated: 06/01/21 1853     Procalcitonin --     Comment: Corrected results  Corrected result. Previous result was 0.26 ng/mL on 6/1/2021 at 1718 CDT.       Narrative:      As a Marker for Sepsis (Non-Neonates):     1. <0.5 ng/mL represents a low risk of severe sepsis and/or septic shock.  2. >2 ng/mL represents a high risk of severe sepsis and/or septic shock.    As a Marker for Lower Respiratory Tract Infections that require antibiotic therapy:  PCT on Admission     Antibiotic Therapy             6-12 Hrs later  >0.5                          Strongly Recommended            >0.25 - <0.5             Recommended  0.1 - 0.25                  Discouraged                       Remeasure/reassess PCT  <0.1                         Strongly Discouraged          "Remeasure/reassess PCT      As 28 day mortality risk marker: \"Change in Procalcitonin Result\" (>80% or <=80%) if Day 0 (or Day 1) and Day 4 values are available. Refer to http://www.[facility]-pct-calculator.com/    Change in PCT <=80 %   A decrease of PCT levels below or equal to 80% defines a positive change in PCT test result representing a higher risk for 28-day all-cause mortality of patients diagnosed with severe sepsis or septic shock.    Change in PCT >80 %   A decrease of PCT levels of more than 80% defines a negative change in PCT result representing a lower risk for 28-day all-cause mortality of patients diagnosed with severe sepsis or septic shock.                Phosphorus [[id number]] Collected: 06/01/21 1642    Specimen: Blood Updated: 06/01/21 1853     Phosphorus --     Comment: Corrected results  Corrected result. Previous result was 3.5 mg/dL on 6/1/2021 at 1711 CDT.       Green Top (Gel) [[id number]] Collected: 06/01/21 1642    Specimen: Blood Updated: 06/01/21 1853     Extra Tube Hold for add-ons.     Comment: Auto resulted.       Protime-INR [[id number]] Collected: 06/01/21 1642    Specimen: Blood Updated: 06/01/21 1852     Protime --     Comment: Specimen contaminated  Corrected result. Previous result was 14.9 Seconds on 6/1/2021 at 1702 CDT.        INR --     Comment: Specimen contaminated  Corrected result. Previous result was 1.27 on 6/1/2021 at 1702 CDT.       aPTT [[id number]] Collected: 06/01/21 1642    Specimen: Blood Updated: 06/01/21 1852     PTT --     Comment: Specimen contaminated.  Corrected result. Previous result was 51.3 seconds on 6/1/2021 at 1702 CDT.       Light Blue Top [[id number]] Collected: 06/01/21 1642    Specimen: Blood Updated: 06/01/21 1852     Extra Tube hold for add-on     Comment: Auto resulted       Manual Differential [[id number]] Collected: 06/01/21 1642    Specimen: Blood Updated: 06/01/21 1841     Atypical Lymphocyte % --     Comment: Corrected result. Previous result " was 1.0 % on 6/1/2021 at 1735 CDT.        Neutrophils Absolute --     Comment: Corrected result. Previous result was 2.20 10*3/mm3 on 6/1/2021 at 1735 CDT.        Lymphocytes Absolute --     Comment: Corrected result. Previous result was 0.36 10*3/mm3 on 6/1/2021 at 1735 CDT.        Monocytes Absolute --     Comment: Corrected result. Previous result was 0.08 10*3/mm3 on 6/1/2021 at 1735 CDT.        Basophils Absolute --     Comment: Corrected result. Previous result was 0.06 10*3/mm3 on 6/1/2021 at 1735 CDT.        nRBC --     Comment: Corrected result. Previous result was 2.1 /100 WBC on 6/1/2021 at 1735 CDT.        RBC Morphology Normal     Comment: Appended report. These results have been appended to a previously final verified report.        Anisocytosis --     Comment: Corrected result. Previous result was Slight/1+ on 6/1/2021 at 1735 CDT.        Basophilic Stippling --     Comment: Corrected result. Previous result was Slight/1+ on 6/1/2021 at 1735 CDT.        Crenated RBC's --     Comment: Corrected result. Previous result was Slight/1+ on 6/1/2021 at 1735 CDT.        Polychromasia --     Comment: Corrected result. Previous result was Slight/1+ on 6/1/2021 at 1735 CDT.        WBC Morphology --     Comment: Corrected result. Previous result was Normal on 6/1/2021 at 1735 CDT.        Platelet Morphology --     Comment: Corrected result. Previous result was Normal on 6/1/2021 at 1735 CDT.        Neutrophil/Total Counted Leukocytes, Manual 0     Comment: Corrected results  Appended report. These results have been appended to a previously final verified report.       Narrative:      The previously reported component Neutrophils % is no longer being reported. Previous result was 81.4 % (Reference Range: 42.7-76.0 %) on 6/1/2021 at 1735 CDT.  The previously reported component Lymphocytes % is no longer being reported. Previous result was 12.4 % (Reference Range: 19.6-45.3 %) on 6/1/2021 at 1735 CDT.  The  previously reported component Monocytes % is no longer being reported. Previous result was 3.1 % (Reference Range: 5.0-12.0 %) on 6/1/2021 at 1735 CDT.  The previously reported component Basophils % is no longer being reported. Previous result was 2.1 % (Reference Range: 0.0-1.5 %) on 6/1/2021 at 1735 CDT.    Comprehensive Metabolic Panel [[id number]]  (Abnormal) Collected: 06/01/21 1759    Specimen: Blood Updated: 06/01/21 1830     Glucose 126 mg/dL      BUN 23 mg/dL      Creatinine 3.30 mg/dL      Sodium 133 mmol/L      Potassium 3.7 mmol/L      Chloride 91 mmol/L      CO2 28.0 mmol/L      Calcium 9.1 mg/dL      Total Protein 6.7 g/dL      Albumin 3.90 g/dL      ALT (SGPT) 17 U/L      AST (SGOT) 20 U/L      Alkaline Phosphatase 263 U/L      Total Bilirubin 0.4 mg/dL      eGFR Non African Amer 14 mL/min/1.73      Comment: <15 Indicative of kidney failure.        eGFR   Amer --     Comment: <15 Indicative of kidney failure.        Globulin 2.8 gm/dL      A/G Ratio 1.4 g/dL      BUN/Creatinine Ratio 7.0     Anion Gap 14.0 mmol/L     Narrative:      GFR Normal >60  Chronic Kidney Disease <60  Kidney Failure <15      CBC & Differential [[id number]]  (Abnormal) Collected: 06/01/21 1759    Specimen: Blood Updated: 06/01/21 1830    Narrative:      The following orders were created for panel order CBC & Differential.  Procedure                               Abnormality         Status                     ---------                               -----------         ------                     CBC Auto Differential[[id number]]        Abnormal            Final result                 Please view results for these tests on the individual orders.    CBC Auto Differential [[id number]]  (Abnormal) Collected: 06/01/21 1759    Specimen: Blood Updated: 06/01/21 1830     WBC 3.43 10*3/mm3      RBC 3.27 10*6/mm3      Hemoglobin 10.1 g/dL      Hematocrit 31.8 %      MCV 97.2 fL      MCH 30.9 pg      MCHC 31.8 g/dL      RDW 17.6 %       RDW-SD 61.7 fl      MPV 9.1 fL      Platelets 273 10*3/mm3      Neutrophil % 76.4 %      Lymphocyte % 9.6 %      Monocyte % 10.8 %      Eosinophil % 2.6 %      Basophil % 0.3 %      Immature Grans % 0.3 %      Neutrophils, Absolute 2.62 10*3/mm3      Lymphocytes, Absolute 0.33 10*3/mm3      Monocytes, Absolute 0.37 10*3/mm3      Eosinophils, Absolute 0.09 10*3/mm3      Basophils, Absolute 0.01 10*3/mm3      Immature Grans, Absolute 0.01 10*3/mm3      nRBC 1.7 /100 WBC     CBC & Differential [[id number]] Collected: 06/01/21 1642    Specimen: Blood Updated: 06/01/21 1826    Narrative:      The following orders were created for panel order CBC & Differential.  Procedure                               Abnormality         Status                     ---------                               -----------         ------                     CBC Auto Differential[[id number]]                            Edited Result - FINAL        Please view results for these tests on the individual orders.    CBC Auto Differential [[id number]] Collected: 06/01/21 1642    Specimen: Blood Updated: 06/01/21 1826     WBC --     Comment: Corrected results  Corrected result. Previous result was 2.70 10*3/mm3 on 6/1/2021 at 1735 CDT.        RBC --     Comment: Corrected results  Corrected result. Previous result was 3.00 10*6/mm3 on 6/1/2021 at 1735 CDT.        Hemoglobin --     Comment: Corrected results  Corrected result. Previous result was 9.5 g/dL on 6/1/2021 at 1735 CDT.        Hematocrit --     Comment: Corrected results  Corrected result. Previous result was 30.7 % on 6/1/2021 at 1735 CDT.        MCV --     Comment: Corrected results  Corrected result. Previous result was 102.3 fL on 6/1/2021 at 1735 CDT.        MCH --     Comment: Corrected results  Corrected result. Previous result was 31.7 pg on 6/1/2021 at 1735 CDT.        MCHC --     Comment: Corrected results  Corrected result. Previous result was 30.9 g/dL on 6/1/2021 at 1735 CDT.         RDW --     Comment: Corrected results  Corrected result. Previous result was 18.0 % on 6/1/2021 at 1735 CDT.        RDW-SD --     Comment: Corrected result. Previous result was 65.1 fl on 6/1/2021 at 1735 CDT.        MPV --     Comment: Corrected result. Previous result was 9.1 fL on 6/1/2021 at 1735 CDT.        Platelets --     Comment: Corrected results  Corrected result. Previous result was 213 10*3/mm3 on 6/1/2021 at 1735 CDT.       Narrative:      The previously reported component NRBC is no longer being reported. Previous result was 3.0 /100 WBC (Reference Range: 0.0-0.2 /100 WBC) on 6/1/2021 at 1708 CDT.    Red Top [[id number]] Collected: 06/01/21 1642    Specimen: Blood Updated: 06/01/21 1745     Extra Tube Hold for add-ons.     Comment: Auto resulted.       Lavender Top [[id number]] Collected: 06/01/21 1642    Specimen: Blood Updated: 06/01/21 1745     Extra Tube hold for add-on     Comment: Auto resulted       Blood Culture - Blood, Arm, Right [[id number]] Collected: 06/01/21 1711    Specimen: Blood from Arm, Right Updated: 06/01/21 1717    Blood Culture - Blood, Arm, Right [[id number]] Collected: 06/01/21 1705    Specimen: Blood from Arm, Right Updated: 06/01/21 1717    Blood Gas, Arterial - [[id number]]  (Abnormal) Collected: 06/01/21 1655    Specimen: Arterial Blood Updated: 06/01/21 1658     Site Right Radial     Rustam's Test Positive     pH, Arterial 7.430 pH units      pCO2, Arterial 43.5 mm Hg      pO2, Arterial 63.3 mm Hg      Comment: 84 Value below reference range        HCO3, Arterial 28.8 mmol/L      Comment: 83 Value above reference range        Base Excess, Arterial 4.0 mmol/L      Comment: 83 Value above reference range        O2 Saturation, Arterial 93.8 %      Comment: 84 Value below reference range        Temperature 37.0 C      Barometric Pressure for Blood Gas 753 mmHg      Modality Room Air     FIO2 21 %      Ventilator Mode NA     Collected by [id number]     Comment: Meter: [id number]      :  [id number]        pCO2, Temperature Corrected 43.5 mm Hg      pH, Temp Corrected 7.430 pH Units      pO2, Temperature Corrected 63.3 mm Hg     POC Glucose Once [[id number]]  (Abnormal) Collected: 06/01/21 1632    Specimen: Blood Updated: 06/01/21 1643     Glucose 47 mg/dL      Comment: : [id number] Pawan JessicaMeter ID: [id number]       POC Glucose Once [[id number]]  (Abnormal) Collected: 06/01/21 1630    Specimen: Blood Updated: 06/01/21 1643     Glucose 46 mg/dL      Comment: : [id number] Pawan JessicaMeter ID: [id number]           Imaging Results (Last 24 Hours)     Procedure Component Value Units Date/Time    CT Abdomen Pelvis Without Contrast [[id number]] Collected: 06/01/21 1952     Updated: 06/01/21 1957    Narrative:      CT ABDOMEN PELVIS WO CONTRAST- 6/1/2021 6:51 PM CDT     HISTORY: AMS; R41.82-Altered mental status, unspecified;  E16.2-Hypoglycemia, unspecified      COMPARISON: Chest CT dated 6/1/2021      DOSE LENGTH PRODUCT: 709 mGy cm. Automated exposure control was also  utilized to decrease patient radiation dose.     TECHNIQUE: Noncontrast enhanced images of the abdomen and pelvis  obtained without oral contrast. Multiplanar reformatted images were  provided for review.      FINDINGS:      LIVER: No focal liver lesion within limits of a noncontrast study.      BILIARY SYSTEM: The gallbladder is surgically absent. No intrahepatic or  extrahepatic ductal dilatation.      PANCREAS: No focal pancreatic lesion within limits of a noncontrast  study.      SPLEEN: Unremarkable.      KIDNEYS: Bilateral renal atrophy. No hydronephrosis. The ureters are  decompressed and normal in appearance.     ADRENALS: Unremarkable.     RETROPERITONEUM: No mass, lymphadenopathy or hemorrhage.      GI TRACT: The stomach and small bowel are unremarkable. Moderate colonic  stool. No inflammatory changes along the bowel.      OTHER: Bilateral upper thigh and diffuse body wall subcutaneous edema  reflecting  systemic volume overload. Trace ascites which I suspect is  also related to the systemic volume overload. No acute bony abnormality.     PELVIS: No mass lesion, fluid collection or significant lymphadenopathy  is seen in the pelvis. The urinary bladder is normal in appearance.       Impression:      1. Anasarca. Otherwise, no acute process involving the abdomen or  pelvis.  This report was finalized on 06/01/2021 19:54 by Dr Jhonatan Berry, .    CT Chest Without Contrast Diagnostic [[id number]] Collected: 06/01/21 1948     Updated: 06/01/21 1955    Narrative:      CT CHEST WO CONTRAST DIAGNOSTIC- 6/1/2021 6:51 PM CDT     HISTORY: AMS; R41.82-Altered mental status, unspecified;  E16.2-Hypoglycemia, unspecified     COMPARISON: CT scan dated 1/19/2020     DOSE LENGTH PRODUCT: 257 mGy cm. Automated exposure control was also  utilized to decrease patient radiation dose.     TECHNIQUE: Serial helical tomographic images of the chest were acquired  without contrast. Multiplanar reformatted images were provided for  review.     FINDINGS:     Visualized neck base: The imaged portion of the base of the neck appears  unremarkable.      Lungs: Right middle and right lower lobe airways are completely  opacified with debris and there is atelectasis of both of these lobes.  Interlobular septal thickening and peribronchial thickening with  bilateral groundglass opacities, reflecting pulmonary edema. There is a  moderate to large pleural effusion on the right. 7 mm left upper lobe  pulmonary nodule (series 3-image 47).     Heart: Prominent four-chamber cardiomegaly. There is no pericardial  effusion. Advanced coronary artery atheromatous calcification.     Vasculature: There is calcified atheromatous plaque in the aorta without  aneurysmal dilation. The pulmonary arteries are enlarged, suggestive of  pulmonary arterial hypertension.     Mediastinum and lymph nodes: No suspicious hilar or mediastinal  adenopathy..     Skeletal and soft  tissues: Diffuse chest wall edema. Comminuted  fracturing of the right proximal humerus.       Impression:      1. Volume overload with interstitial and casey pulmonary edema. There is  also a moderate to large right pleural effusion.  2. There is debris diffusely opacifying the right middle and right lower  lobe airways with atelectasis of both of these lobes, suspected mucous  plugging or aspirated contents.  3. Comminuted fracturing of the right proximal humerus.  This report was finalized on 06/01/2021 19:52 by Dr Jhonatan Berry, .    CT Head Without Contrast [[id number]] Collected: 06/01/21 1930     Updated: 06/01/21 1934    Narrative:      CT HEAD WO CONTRAST- 6/1/2021 6:51 PM CDT     HISTORY: Mental status change, unknown cause; R41.82-Altered mental  status, unspecified; E16.2-Hypoglycemia, unspecified       DOSE LENGTH PRODUCT: 668 mGy cm. Automated exposure control was also  utilized to decrease patient radiation dose.     Technique:   Axial CT of the brain without IV contrast. Sagittal and coronal  reformations are also provided for review. Soft tissue and bone kernels  are available for interpretation.     Comparison: CT scan dated 4/3/2021.     Findings:      There is no evidence of acute large vascular territory infarct. No  intra-axial or extra-axial hemorrhage. No visualized mass lesion or mass  effect. The ventricles, cortical sulci and basal cisterns are symmetric  and age appropriate.  Posterior fossa structures are unremarkable. The  scalp and calvarium are intact. Visualized paranasal sinuses and  mastoids are clear.        Impression:      Impression:    1. No acute intracranial process. Stable exam.  This report was finalized on 06/01/2021 19:31 by Dr Jhonatan Berry, .    XR Chest 1 View [[id number]] Collected: 06/01/21 1834     Updated: 06/01/21 1837    Narrative:      Frontal upright radiograph of the chest 6/1/2021 6:10 PM CDT     HISTORY: Central line     COMPARISON: 6/1/2021.        Impression:      FINDINGS/IMPRESSION:      Right IJ central line is in good position. Otherwise stable. No  pneumothorax.  This report was finalized on 06/01/2021 18:34 by Dr Jhonatan Berry, .    XR Chest 1 View [[id number]] Collected: 06/01/21 1715     Updated: 06/01/21 1720    Narrative:      Frontal upright radiograph of the chest 6/1/2021 5:12 PM CDT     HISTORY: Altered mental status     COMPARISON: Chest exam dated 5/15/2021.     FINDINGS:      Stable moderate to large layering right pleural effusion with continued  opacification of the right lung base. Left lung is clear. Heart size is  stable. The pulmonary vasculature are nondilated. No pneumothorax.  Partially imaged impacted fracture across the surgical neck of the right  proximal humerus.       Impression:      1. No significant interval change from the recent comparison exam. There  is a moderate to large layering right pleural effusion with  opacification of the right lung base.        This report was finalized on 06/01/2021 17:17 by Dr Jhonatan Berry, .        I have personally reviewed and interpreted the radiology studies and ECG obtained at time of admission.     Assessment / Plan     Assessment:   Active Hospital Problems    Diagnosis    • **Altered mental status    • Sepsis (CMS/HCC)    • Hypoglycemia    • End stage renal failure on dialysis (CMS/HCC)    • Diabetes (CMS/HCC)    • HTN (hypertension)    1.  Altered mental status/encephalopathy uncertain if this is related to medication effect glucose regulation or infection.  We will hold all sedating medications serial neuro checks in ICU.  Culture blood sputum since it may be an aspiration event as well.  Will start broad-spectrum antibiotics for now monitor volume status.  Will DC Narcan drip as it does not work.  Her sugars have come up so we will change her D10 over to D5 normal saline since her sodium is low.do not want to drop it down any further.  2.  End-stage renal disease on dialysis  consult nephrology for assistance.  3.  Type 2 diabetes with hypoglycemia hold long-acting insulin and oral hypoglycemics will do Accu-Chek sliding scale insulin continue D5 for now.  4.  Sepsis uncertain if this is the case or not we will culture blood and sputum antibiotics as above.    Patient seen prior to midnight       Code Status/Advanced Care Plan: Full    The patient's surrogate decision maker is unable to determine at this time.     I discussed my findings and recommendations with the nursing staff.    Estimated length of stay is to be determined.     The patient was seen and examined by me on June 1, 2021 at 10:50 PM.    Electronically signed by Tejinder Kent MD, 06/02/21, 07:10 CDT.

## 2021-06-02 NOTE — PLAN OF CARE
Problem: Restraint, Nonbehavioral (Nonviolent)  Goal: Discontinuation Criteria Achieved  Outcome: Ongoing, Progressing  Intervention: Implement Least-restrictive Safety Strategies  Recent Flowsheet Documentation  Taken 6/2/2021 1600 by Daniela Prabhakar RN  Medical Device Protection: IV pole/bag removed from visual field  Diversional Activities: television  Taken 6/2/2021 1200 by Daniela Prabhakar RN  Medical Device Protection: IV pole/bag removed from visual field  Diversional Activities: television  Taken 6/2/2021 0813 by Daniela Prabhakar RN  Medical Device Protection: IV pole/bag removed from visual field  Goal: Personal Dignity and Safety Maintained  Outcome: Ongoing, Progressing  Intervention: Protect Dignity, Rights, and Personal Wellbeing  Recent Flowsheet Documentation  Taken 6/2/2021 1600 by Daniela Prabhakar RN  Trust Relationship/Rapport: care explained  Taken 6/2/2021 1200 by Daniela Prabhakar RN  Trust Relationship/Rapport: care explained  Taken 6/2/2021 0813 by Daniela Prabhakar RN  Trust Relationship/Rapport: care explained  Intervention: Protect Skin and Joint Integrity  Recent Flowsheet Documentation  Taken 6/2/2021 1800 by Daniela Prabhakar RN  Body Position:   turned   supine  Taken 6/2/2021 1700 by Daniela Prabhakar RN  Body Position: position maintained  Taken 6/2/2021 1600 by Daniela Prabhakar RN  Body Position:   turned   side-lying, right  Range of Motion: active ROM (range of motion) encouraged  Taken 6/2/2021 1500 by Daniela Prabhakar RN  Body Position: position maintained  Taken 6/2/2021 1400 by Daniela Prabhakar RN  Body Position:   turned   side-lying, left  Taken 6/2/2021 1300 by Daniela Prabhakar RN  Body Position: position maintained  Taken 6/2/2021 1200 by Daniela Prabhakar RN  Body Position:   turned   side-lying, right  Range of Motion: active ROM (range of motion) encouraged  Taken 6/2/2021 1100 by Daniela Prabhakar RN  Body Position:  position maintained  Taken 6/2/2021 1000 by Daniela Prabhakar RN  Body Position:   turned   side-lying, left  Taken 6/2/2021 0900 by Daniela Prahbakar RN  Body Position: position maintained  Taken 6/2/2021 0813 by Daniela Prabhakar RN  Body Position:   turned   side-lying, right  Range of Motion: active ROM (range of motion) encouraged     Problem: Fall Injury Risk  Goal: Absence of Fall and Fall-Related Injury  Outcome: Ongoing, Progressing  Intervention: Identify and Manage Contributors to Fall Injury Risk  Recent Flowsheet Documentation  Taken 6/2/2021 1800 by Daniela Prabhakar RN  Medication Review/Management: medications reviewed  Taken 6/2/2021 1700 by Daniela Prabhakar RN  Medication Review/Management: medications reviewed  Taken 6/2/2021 1600 by Daniela Prabhakar RN  Medication Review/Management: medications reviewed  Taken 6/2/2021 1500 by Daniela Prabhakar RN  Medication Review/Management: medications reviewed  Taken 6/2/2021 1400 by Daniela Prabhakar RN  Medication Review/Management: medications reviewed  Taken 6/2/2021 1300 by Daniela Prabhakar RN  Medication Review/Management: medications reviewed  Taken 6/2/2021 1200 by Daniela Prabhakar RN  Medication Review/Management: medications reviewed  Taken 6/2/2021 1100 by Daniela Prabhakar RN  Medication Review/Management: medications reviewed  Taken 6/2/2021 1000 by Daniela Prabhakar RN  Medication Review/Management: medications reviewed  Taken 6/2/2021 0900 by Daniela Prabhakar RN  Medication Review/Management: medications reviewed  Taken 6/2/2021 0813 by Daniela Prabhakar RN  Medication Review/Management: medications reviewed  Intervention: Promote Injury-Free Environment  Recent Flowsheet Documentation  Taken 6/2/2021 1800 by Whitenight, Daniela, RN  Safety Promotion/Fall Prevention:   safety round/check completed   fall prevention program maintained  Taken 6/2/2021 1700 by Daniela Prabhakar RN  Safety  Promotion/Fall Prevention:   safety round/check completed   fall prevention program maintained  Taken 6/2/2021 1600 by Whitenight, Daniela, RN  Safety Promotion/Fall Prevention:   safety round/check completed   fall prevention program maintained  Taken 6/2/2021 1500 by Whitenight, Daniela, RN  Safety Promotion/Fall Prevention:   safety round/check completed   fall prevention program maintained  Taken 6/2/2021 1400 by Whitenight, Daniela, RN  Safety Promotion/Fall Prevention:   safety round/check completed   fall prevention program maintained  Taken 6/2/2021 1300 by Daniela Prabhakar RN  Safety Promotion/Fall Prevention:   safety round/check completed   fall prevention program maintained  Taken 6/2/2021 1200 by Whitenight, Daniela, RN  Safety Promotion/Fall Prevention:   safety round/check completed   fall prevention program maintained  Taken 6/2/2021 1100 by Whitenight, Daniela, RN  Safety Promotion/Fall Prevention:   safety round/check completed   fall prevention program maintained  Taken 6/2/2021 1000 by Whitenight, Daniela, RN  Safety Promotion/Fall Prevention:   safety round/check completed   fall prevention program maintained  Taken 6/2/2021 0900 by Daniela Prabhakar RN  Safety Promotion/Fall Prevention:   safety round/check completed   fall prevention program maintained  Taken 6/2/2021 0813 by Daniela Prabhakar RN  Safety Promotion/Fall Prevention:   safety round/check completed   fall prevention program maintained     Problem: Skin Injury Risk Increased  Goal: Skin Health and Integrity  Outcome: Ongoing, Progressing  Intervention: Optimize Skin Protection  Recent Flowsheet Documentation  Taken 6/2/2021 1800 by Daniela Prabhakar RN  Head of Bed (HOB): HOB elevated  Taken 6/2/2021 1700 by Daniela Prabhakar RN  Head of Bed (HOB): HOB elevated  Taken 6/2/2021 1600 by Daniela Prabhakar RN  Head of Bed (HOB): HOB elevated  Taken 6/2/2021 1500 by Daniela Prabhakar RN  Head of Bed (HOB): HOB  elevated  Taken 6/2/2021 1400 by Daniela Prabhakar RN  Head of Bed (HOB): HOB elevated  Taken 6/2/2021 1300 by Daniela Prabhakar RN  Head of Bed (HOB): HOB elevated  Taken 6/2/2021 1200 by Daniela Prabhakar RN  Head of Bed (HOB): HOB elevated  Taken 6/2/2021 1100 by Daniela Prabhakar RN  Head of Bed (HOB): HOB elevated  Taken 6/2/2021 1000 by Daniela Prabhakar RN  Head of Bed (HOB): HOB elevated  Taken 6/2/2021 0900 by Daniela Prabhakar RN  Head of Bed (HOB): HOB elevated  Taken 6/2/2021 0813 by Daniela Prabhakar RN  Pressure Reduction Techniques: weight shift assistance provided  Head of Bed (HOB): HOB elevated  Pressure Reduction Devices: pressure-redistributing mattress utilized  Skin Protection:   electrode sites changed   adhesive use limited   incontinence pads utilized   skin-to-device areas padded   skin-to-skin areas padded     Problem: Adult Inpatient Plan of Care  Goal: Plan of Care Review  Outcome: Ongoing, Progressing  Goal: Patient-Specific Goal (Individualized)  Outcome: Ongoing, Progressing  Goal: Absence of Hospital-Acquired Illness or Injury  Outcome: Ongoing, Progressing  Intervention: Identify and Manage Fall Risk  Recent Flowsheet Documentation  Taken 6/2/2021 1800 by Whitenight, Daniela, RN  Safety Promotion/Fall Prevention:   safety round/check completed   fall prevention program maintained  Taken 6/2/2021 1700 by Daniela Prabhakar RN  Safety Promotion/Fall Prevention:   safety round/check completed   fall prevention program maintained  Taken 6/2/2021 1600 by Whitenight, Daniela, RN  Safety Promotion/Fall Prevention:   safety round/check completed   fall prevention program maintained  Taken 6/2/2021 1500 by Whitenight, Daniela, RN  Safety Promotion/Fall Prevention:   safety round/check completed   fall prevention program maintained  Taken 6/2/2021 1400 by Whitenight, Daniela, RN  Safety Promotion/Fall Prevention:   safety round/check completed   fall prevention program  maintained  Taken 6/2/2021 1300 by Daniela Prabhakar RN  Safety Promotion/Fall Prevention:   safety round/check completed   fall prevention program maintained  Taken 6/2/2021 1200 by Whitenight, Daniela, RN  Safety Promotion/Fall Prevention:   safety round/check completed   fall prevention program maintained  Taken 6/2/2021 1100 by Whitenight, Daniela, RN  Safety Promotion/Fall Prevention:   safety round/check completed   fall prevention program maintained  Taken 6/2/2021 1000 by Whitenight, Daniela, RN  Safety Promotion/Fall Prevention:   safety round/check completed   fall prevention program maintained  Taken 6/2/2021 0900 by Daniela Prabhakar RN  Safety Promotion/Fall Prevention:   safety round/check completed   fall prevention program maintained  Taken 6/2/2021 0813 by Daniela Prabhakar RN  Safety Promotion/Fall Prevention:   safety round/check completed   fall prevention program maintained  Intervention: Prevent Skin Injury  Recent Flowsheet Documentation  Taken 6/2/2021 1800 by Daniela Prabhakar RN  Body Position:   turned   supine  Taken 6/2/2021 1700 by Daniela Prabhakar RN  Body Position: position maintained  Taken 6/2/2021 1600 by Daniela Prabhakar RN  Body Position:   turned   side-lying, right  Taken 6/2/2021 1500 by Daniela Prabhakar RN  Body Position: position maintained  Taken 6/2/2021 1400 by Daniela Prabhakar RN  Body Position:   turned   side-lying, left  Taken 6/2/2021 1300 by Daniela Prabhakar RN  Body Position: position maintained  Taken 6/2/2021 1200 by Daniela Prabhakar RN  Body Position:   turned   side-lying, right  Taken 6/2/2021 1100 by Daniela Prabhakar RN  Body Position: position maintained  Taken 6/2/2021 1000 by Daniela Prabhakar RN  Body Position:   turned   side-lying, left  Taken 6/2/2021 0900 by Daniela Prabhakar RN  Body Position: position maintained  Taken 6/2/2021 0813 by Daniela Prabhakar RN  Body Position:   turned   side-lying,  right  Skin Protection:   electrode sites changed   adhesive use limited   incontinence pads utilized   skin-to-device areas padded   skin-to-skin areas padded  Intervention: Prevent and Manage VTE (venous thromboembolism) Risk  Recent Flowsheet Documentation  Taken 6/2/2021 1600 by Daniela Prabhakar RN  VTE Prevention/Management: (see mar) other (see comments)  Taken 6/2/2021 0813 by Daniela Prabhakar RN  VTE Prevention/Management: (see mar) other (see comments)  Goal: Optimal Comfort and Wellbeing  Outcome: Ongoing, Progressing  Intervention: Provide Person-Centered Care  Recent Flowsheet Documentation  Taken 6/2/2021 1600 by Daniela Prabhakar RN  Trust Relationship/Rapport: care explained  Taken 6/2/2021 1200 by Daniela Prabhakar RN  Trust Relationship/Rapport: care explained  Taken 6/2/2021 0813 by Daniela Prabhakar RN  Trust Relationship/Rapport: care explained  Goal: Readiness for Transition of Care  Outcome: Ongoing, Progressing  Intervention: Mutually Develop Transition Plan  Recent Flowsheet Documentation  Taken 6/2/2021 1214 by Daniela Prabhakar RN  Equipment Currently Used at Home:   walker, rolling   wheelchair  Taken 6/2/2021 1208 by Daniela Prabhakar RN  Transportation Anticipated: public transportation  Transportation Concerns: (public transportation will only take patient with wheelchair) other (see comments)  Patient/Family Anticipated Services at Transition:   home health care   rehabilitation services  Patient/Family Anticipates Transition to: home with family  Taken 6/2/2021 1200 by Daniela Prabhakar RN  Equipment Currently Used at Home:   wheelchair   walker, rolling     Problem: Adjustment to Illness (Chronic Kidney Disease)  Goal: Optimal Coping with Chronic Illness  Outcome: Ongoing, Progressing  Intervention: Support and Optimize Psychosocial Response to Chronic Illness  Recent Flowsheet Documentation  Taken 6/2/2021 1600 by Daniela Prabhakar RN  Family/Support  System Care: support provided  Taken 6/2/2021 1200 by Daniela Prabhakar RN  Family/Support System Care: support provided     Problem: Electrolyte Imbalance (Chronic Kidney Disease)  Goal: Electrolyte Balance  Outcome: Ongoing, Progressing     Problem: Fluid Volume Excess (Chronic Kidney Disease)  Goal: Fluid Balance  Outcome: Ongoing, Progressing  Intervention: Monitor and Manage Hypervolemia  Recent Flowsheet Documentation  Taken 6/2/2021 0813 by Daniela Prabhakar RN  Skin Protection:   electrode sites changed   adhesive use limited   incontinence pads utilized   skin-to-device areas padded   skin-to-skin areas padded     Problem: Functional Decline (Chronic Kidney Disease)  Goal: Optimal Functional Ability  Outcome: Ongoing, Progressing  Intervention: Optimize Functional Ability  Recent Flowsheet Documentation  Taken 6/2/2021 1800 by Daniela Prabhakar RN  Activity Management: bedrest  Taken 6/2/2021 1700 by Daniela Prabhakar RN  Activity Management: bedrest  Taken 6/2/2021 1600 by Daniela Prabhakar RN  Activity Management: bedrest  Taken 6/2/2021 1500 by Daniela Prabhakar RN  Activity Management: bedrest  Taken 6/2/2021 1400 by Daniela Prabhakar RN  Activity Management: bedrest  Taken 6/2/2021 1300 by Daniela Prabhakar RN  Activity Management: bedrest  Taken 6/2/2021 1200 by Daniela Prabhakar RN  Activity Management: bedrest  Taken 6/2/2021 1100 by Daniela Prabhakar RN  Activity Management: bedrest  Taken 6/2/2021 1000 by Daniela Prabhakar RN  Activity Management: bedrest  Taken 6/2/2021 0900 by Daniela Prabhakar RN  Activity Management: bedrest  Taken 6/2/2021 0813 by Daniela Prabhakar RN  Activity Management: bedrest     Problem: Hematologic Alteration (Chronic Kidney Disease)  Goal: Absence of Anemia Signs and Symptoms  Outcome: Ongoing, Progressing     Problem: Oral Intake Inadequate (Chronic Kidney Disease)  Goal: Optimal Oral Intake  Outcome: Ongoing, Progressing      Problem: Renal Function Impairment (Chronic Kidney Disease)  Goal: Laboratory Values and Blood Pressure Within Desired Range  Outcome: Ongoing, Progressing  Intervention: Monitor and Support Renal Function  Recent Flowsheet Documentation  Taken 6/2/2021 1800 by Daniela Prabhakar RN  Medication Review/Management: medications reviewed  Taken 6/2/2021 1700 by Daniela Prabhakar RN  Medication Review/Management: medications reviewed  Taken 6/2/2021 1600 by Daniela Prabhakar RN  Medication Review/Management: medications reviewed  Taken 6/2/2021 1500 by Daniela Prabhakar RN  Medication Review/Management: medications reviewed  Taken 6/2/2021 1400 by Daniela Prabhakar RN  Medication Review/Management: medications reviewed  Taken 6/2/2021 1300 by Daniela Prabhakar RN  Medication Review/Management: medications reviewed  Taken 6/2/2021 1200 by Daniela Prabhakar RN  Medication Review/Management: medications reviewed  Taken 6/2/2021 1100 by Daniela Prabhakar RN  Medication Review/Management: medications reviewed  Taken 6/2/2021 1000 by Daniela Prabhakar RN  Medication Review/Management: medications reviewed  Taken 6/2/2021 0900 by Daniela Prabhakar RN  Medication Review/Management: medications reviewed  Taken 6/2/2021 0813 by Daniela Prabhakar RN  Medication Review/Management: medications reviewed     Problem: Fluid Imbalance (Pneumonia)  Goal: Fluid Balance  Outcome: Ongoing, Progressing     Problem: Infection (Pneumonia)  Goal: Resolution of Infection Signs and Symptoms  Outcome: Ongoing, Progressing     Problem: Respiratory Compromise (Pneumonia)  Goal: Effective Oxygenation and Ventilation  Outcome: Ongoing, Progressing  Intervention: Optimize Oxygenation and Ventilation  Recent Flowsheet Documentation  Taken 6/2/2021 1800 by Daniela Prabhakar RN  Head of Bed (HOB): HOB elevated  Taken 6/2/2021 1700 by Daniela Prabhakar RN  Head of Bed (HOB): HOB elevated  Taken 6/2/2021 1600 by  Daniela Prabhakar RN  Head of Bed (HOB): HOB elevated  Taken 6/2/2021 1500 by Daniela Prabhakar RN  Head of Bed (HOB): HOB elevated  Taken 6/2/2021 1400 by Daniela Prabhakar RN  Head of Bed (HOB): HOB elevated  Taken 6/2/2021 1300 by Daniela Prabhakar RN  Head of Bed (HOB): HOB elevated  Taken 6/2/2021 1200 by Daniela Prabhakar RN  Head of Bed (HOB): HOB elevated  Taken 6/2/2021 1100 by Daniela Prabhakar RN  Head of Bed (HOB): HOB elevated  Taken 6/2/2021 1000 by Daniela Prabhakar RN  Head of Bed (HOB): HOB elevated  Taken 6/2/2021 0900 by Daniela Prabhakar RN  Head of Bed (HOB): HOB elevated  Taken 6/2/2021 0813 by Daniela Prabhakar RN  Head of Bed (HOB): HOB elevated     Problem: Diabetes Comorbidity  Goal: Blood Glucose Level Within Desired Range  Outcome: Ongoing, Progressing  Intervention: Maintain Glycemic Control  Recent Flowsheet Documentation  Taken 6/2/2021 1600 by Daniela Prabhakar RN  Glycemic Management: blood glucose monitoring  Taken 6/2/2021 1200 by Daniela Prabhakar RN  Glycemic Management: blood glucose monitoring  Taken 6/2/2021 0813 by Daniela Prabhakar RN  Glycemic Management: blood glucose monitoring     Problem: Heart Failure Comorbidity  Goal: Maintenance of Heart Failure Symptom Control  Outcome: Ongoing, Progressing  Intervention: Maintain Heart Failure-Management Strategies  Recent Flowsheet Documentation  Taken 6/2/2021 1800 by Daniela Prabhakar RN  Medication Review/Management: medications reviewed  Taken 6/2/2021 1700 by Daniela Prabhakar RN  Medication Review/Management: medications reviewed  Taken 6/2/2021 1600 by Daniela Prabhakar RN  Medication Review/Management: medications reviewed  Taken 6/2/2021 1500 by Daniela Prabhakar RN  Medication Review/Management: medications reviewed  Taken 6/2/2021 1400 by Daniela Prabhakar RN  Medication Review/Management: medications reviewed  Taken 6/2/2021 1300 by Daniela Prabhakar RN  Medication  Review/Management: medications reviewed  Taken 6/2/2021 1200 by Daniela Prabhakar RN  Medication Review/Management: medications reviewed  Taken 6/2/2021 1100 by Daniela Prabhakar RN  Medication Review/Management: medications reviewed  Taken 6/2/2021 1000 by Daniela Prabhakar RN  Medication Review/Management: medications reviewed  Taken 6/2/2021 0900 by Daniela Prabhakar RN  Medication Review/Management: medications reviewed  Taken 6/2/2021 0813 by Daniela Prabhakar RN  Medication Review/Management: medications reviewed     Problem: Hypertension Comorbidity  Goal: Blood Pressure in Desired Range  Outcome: Ongoing, Progressing  Intervention: Maintain Hypertension-Management Strategies  Recent Flowsheet Documentation  Taken 6/2/2021 1800 by Daniela Prabhakar RN  Medication Review/Management: medications reviewed  Taken 6/2/2021 1700 by Daniela Prabhakar RN  Medication Review/Management: medications reviewed  Taken 6/2/2021 1600 by Daniela Prabhakar RN  Medication Review/Management: medications reviewed  Taken 6/2/2021 1500 by Daniela Prabhakar RN  Medication Review/Management: medications reviewed  Taken 6/2/2021 1400 by Daniela Prabhakar RN  Medication Review/Management: medications reviewed  Taken 6/2/2021 1300 by Daniela Prabhakar RN  Medication Review/Management: medications reviewed  Taken 6/2/2021 1200 by Daniela Prabhakar RN  Medication Review/Management: medications reviewed  Taken 6/2/2021 1100 by Daniela Prabhakar RN  Medication Review/Management: medications reviewed  Taken 6/2/2021 1000 by Daniela Prabhakar RN  Medication Review/Management: medications reviewed  Taken 6/2/2021 0900 by Daniela Prabhakar RN  Medication Review/Management: medications reviewed  Taken 6/2/2021 0813 by Daniela Prabhakar RN  Medication Review/Management: medications reviewed   Goal Outcome Evaluation:

## 2021-06-02 NOTE — ED PROVIDER NOTES
I took over the care of this patient from Dr. Chino at shift change.  The overall picture could be 1 of impending sepsis as the CT scan did seem to show aspiration and the patient is currently hypothermic.  Overall I think it behooves us to admit the patient to the ICU and initiate empiric antibiotics.  The patient is still somewhat agitated but has relatively stable vitals otherwise.  Please see Dr. Chino's note for the full history and physical.  I discussed the case with Dr. Kent who kindly agreed to admit the patient under his care.     Benitez Farrell MD  06/01/21 [id number]

## 2021-06-02 NOTE — PAYOR COMM NOTE
"ADMITTED 6/1/2021    [facility]  ELSI,  [phone]  OR  FAX   [phone]    Mini Peña (61 y.o. Female)     Date of Birth Social Security Number Address Home Phone MRN    1960  [address] [place] [id number] [phone] [id number]    M[employer]on Marital Status          Other Single       Admission Date Admission Type Admitting Provider Attending Provider Department, Room/Bed    6/1/21 Emergency Bin Guy, Bin Valiente,  [facility], I008/1    Discharge Date Discharge Disposition Discharge Destination                       Attending Provider: Bin Guy DO    Allergies: Bupropion, Chantix [Varenicline], Gabapentin, Azithromycin, Levofloxacin, Penicillins, Sulfa Antibiotics    Isolation: None   Infection: None   Code Status: CPR    Ht: 139.7 cm (55\")   Wt: 65.1 kg (143 lb 9.6 oz)    Admission Cmt: None   Principal Problem: Altered mental status [R41.82]                 Active Insurance as of 6/1/2021     Primary Coverage     Payor Plan Insurance Group Employer/Plan Group    WELLCARE [facility] MEDICARE REPLACEMENT WELLCARE MEDICARE REPLACEMENT      Payor Plan Address Payor Plan Phone Number Payor Plan Fax Number Effective Dates    [address] [address] [phone]  4/1/2021 - None Entered    [facility] [id number]       Subscriber Name Subscriber Birth Date Member ID       MINI PEÑA 1960 [id number]           Secondary Coverage     Payor Plan Insurance Group Employer/Plan Group    WELLCARE [facility] WELLCARE MEDICAID      Payor Plan Address Payor Plan Phone Number Payor Plan Fax Number Effective Dates    [address] [address] [phone]  11/4/2016 - None Entered    [facility] [id number]       Subscriber Name Subscriber Birth Date Member ID       LUPILLOHIAMANDAMINI 1960 [id number]                 Emergency Contacts      (Rel.) Home Phone Work Phone Mobile Phone    Erica Bowling (Other) [phone] -- [phone]    " Jose Bowling (Significant Other) [phone] -- [phone]        Patient Care Timeline (6/1/2021 16:26 to 6/1/2021 22:30)    6/1/2021 Event Details User   16:26 Patient arrival  Antionette Velazquez RN   16:26:55 Arrival Complaint HYPOGLYCEMIA     16:27 Acuity/Destination Acuity/Destination  Patient Acuity: 2  ED Destination: ED Beds Antionette Velazquez RN     16:30 HPI HPI (Adult)  Stated Reason for Visit: EMS called to [facility] after finishing dialysis. Nurse states that she is more altered than usual and more altered than when she first arrived.  Derrick Reynaga RN   16:33 Vital Signs Vital Signs  Temp src: Oral  Heart Rate: 78  Heart Rate Source: Monitor  Resp: 10  Resp Rate Source: Monitor  Oxygen Therapy  SpO2: 96 %  Device (Oxygen Therapy): room air Antionette Velazquez RN     16:43 Medication Given naloxone (NARCAN) injection 0.4 mg - Dose: 0.4 mg ; Route: Intravenous ; Line: Peripheral IV 06/01/21 1635 Right Wrist ; Scheduled Time: 1644 Derrick Reynaga RN     16:46 Medication Given naloxone (NARCAN) injection 0.4 mg - Dose: 0.4 mg ; Route: Intravenous ; Line: Peripheral IV 06/01/21 1635 Right Wrist ; Scheduled Time: 1647 Derrick Reynaga RN   16:46:17 Orders Acknowledged New  - naloxone (NARCAN) injection 0.4 mg Derrick Reynaga RN     16:48:34 Hypoglycemia Assessments (Adult) Respiratory WDL  Respiratory WDL: WDL except; all  Rhythm/Pattern, Respiratory: shortness of breath  Cardiac WDL  Cardiac WDL: WDL  Peripheral/Neurovascular WDL  Peripheral Neurovascular WDL: WDL  Cognitive/Neuro/Behavioral WDL  Level of Consciousness: Alert  Cognitive/Neuro/Behavioral WDL: WDL except; all  Arousal Level: opens eyes spontaneously  Orientation: disoriented to; situation; time  Speech: slurred  Gastrointestinal WDL  Gastrointestinal WDL: WDL  Genitourinary WDL  Genitourinary WDL: WDL except; all (dialysis pt)  Skin WDL  Skin WDL: WDL  Safety WDL  Safety WDL: WDL Derrick Reynaga RN     17:18 Free Text Patient came in with  hypoglycemia probably under the influence of narcotics which improved with dextrose and naloxone.  Naloxone drip has been ordered D10 drip has been ordered her blood pressure has been fluctuant from systolic 170 to a systolic of 86.  She is somewhat confused lab work-up is pending on this patient. Maverick Edwards MD     17:20 Free Text I think keeping consideration the patient's flexion blood pressure hypoglycemia and her bradypnea this could be related to narcotics waiting on the urine drug screen come back and see any obvious evidence of infection on clinical examination she is not febrile.  Encephalopathic may be. Maverick Edwards MD     17:25 Medication New Bag naloxone (NARCAN) 2 mg in sodium chloride 0.9 % 495 mL infusion - Dose: 1 mg/hr ; Rate: 250 mL/hr ; Route: Intravenous ; Line: Peripheral IV 06/01/21 1635 Right Wrist ; Scheduled Time: 1651 Derrick Reynaga RN     17:33 Free Text With altered mental status encephalopathic which could be related to multifactorial etiologies polypharmacy uremia hypoglycemia and sedative use. Maverick Edwards MD     17:45 Free Text The lab informed us that the patient's sodium 117 therefore a central line was placed by me the blood will be recollected Maverick Edwrads MD     18:12 Medication Given OLANZapine (zyPREXA) injection 10 mg - Dose: 10 mg ; Route: Intramuscular ; Site: Left Ventrogluteal ; Scheduled Time: 1809 Derrick Reynaga RN       18:23:13 Orders Acknowledged New  - Restraints non-violent or non-self destructive ; CBC & Differential Reynaga, Derrick D, RN   18:26 Non-Violent Restraints Non-Violent Restraint Order Information  Order Received or Renewed: Yes  Length of Order: Calendar Day  Justification  Clinical Justification: Pulling lines; Pulling tubes; Removal of equipment  Restraint Monitoring Every 2 Hours- add the appropriate CPG to the care plan  Visual Check: Agitated/restless; Delusional  Circulation: No signs of injury  Range of Motion: Performed  Fluids: IV  fluids  Food/Meal: NPO  Elimination: Offered  Physical Comfort: Reposition  Restraint Type (NV)  Soft Restraint R Wrist (NV): Start  Soft Restraint L Wrist (NV): Start  Restraint Order  Length of Order: Calendar Day  Order Received or Renewed: Yes  Face to Face: Yes  Assess Non-Violent Restraints  Restraints Monitored: Yes Derrick Reynaga RN   18:26 Restraint Summary Other flowsheet entries  NB Restraint Status: START (Preferred Group ID: [id number]) Derrick Reynaga RN     18:38 Medication Given haloperidol lactate (HALDOL) injection 2 mg - Dose: 2 mg ; Route: Intravenous ; Line: Peripheral IV 06/01/21 1635 Right Wrist ; Scheduled Time: 1836 Derrick Reynaga RN       20:00 Neuro Cognitive (Adult) Neuro Cognitive (Adult)  Cognitive/Neuro/Behavioral WDL: WDL except; all  Level of Consciousness: Confusion  Arousal Level: opens eyes spontaneously  Orientation: disoriented to; person; place; time; situation  Speech: illogical; slurred  Mood/Behavior: agitated; combative; anxious  Additional Documentation: Raisa Coma Scale (Group)  Raisa Coma Scale  Best Eye Response: 4-->(E4) spontaneous  Best Motor Response: 6-->(M6) obeys commands  Best Verbal Response: 4-->(V4) confused  Raisa Coma Scale Score: 14 Derrick Reynaga RN     21:06 Medication New Bag piperacillin-tazobactam (ZOSYN) 3.375 g/100 mL 0.9% NS IVPB (mbp) - Dose: 3.375 g ; Route: Intravenous ; Line: CVC Triple Lumen 06/01/21 Right Internal jugular (Medial) ; Scheduled Time: 2045 Derrick Reynaga RN   21:07 Medication Given furosemide (LASIX) injection 80 mg - Dose: 80 mg ; Route: Intravenous ; Line: Peripheral IV 06/01/21 Right Antecubital ; Scheduled Time: 2043 Derrick Reynaga RN   21:11 Medication New Bag vancomycin 1250 mg/250 mL 0.9% NS IVPB ([facility]) - Dose: 1,250 mg ; Route: Intravenous ; Line: CVC Triple Lumen 06/01/21 Right Internal jugular (Distal) ; Scheduled Time: 2045 Derrick Reynaga RN   21:12 Urethral Catheter 16 Fr. Placed Placement Date/Time: 06/01/21  2112   Indications: Acute Urinary Retention  Inserted by: JENNIFFER Reynaga  Hand Hygiene Completed: Yes  Tube Size (Fr.): 16 Fr.  Catheter Balloon Size: 10 mL  Urine Returned: Yes Derrick Reynaga RN       22:30 Critical Care Adult PCS Body System Pain/Comfort/Sleep  Preferred Pain Scale: CPOT (Critical-Care Pain Observation Tool)  CPOT Facial Expression: 0-->relaxed, neutral  CPOT Body Movements: 1-->protection  CPOT Muscle Tension: 0-->relaxed  Ventilator Compliance/Vocalization: 0-->talking in normal tone or no sound  CPOT Score: 1  POSS (Pasero Opioid-Induced Sed Scale): 3 - Frequently drowsy, arousable, drifts off to sleep during conversation  Sleep/Rest/Relaxation: no problem identified; sleeping between care; appears asleep  Coping/Psychosocial  Observed Emotional State: restless (intermittent)  HEENT  HEENT WDL: WDL except; mucosa  Oral Mucosa Symptoms: dry  Mouth/Teeth WDL  Mouth/Teeth WDL: WDL except; teeth  Teeth Symptoms: dental appliance present  Cognitive  Cognitive/Neuro/Behavioral WDL: WDL except; all  Level of Consciousness: Confusion  Arousal Level: arouses to touch/gentle shaking; arouses to voice  Orientation: disoriented to; time; situation  Speech: illogical; slurred  Mood/Behavior: hypoactive (quiet, withdrawn); restless; labile  Pupils  Pupil PERRLA: no  Pupil Size Left: 4 mm  Pupil Shape Left: irregular  Pupil Reaction Left: fixed  Pupil Size Right: 3 mm  Pupil Shape Right: round  Pupil Reaction Right: sluggish  [place] Coma Scale  Best Eye Response: 3-->(E3) to speech  Best Motor Response: 6-->(M6) obeys commands  Best Verbal Response: 4-->(V4) confused  Raisa Coma Scale Score: 13  Motor Response  Motor Response: general motor response  General Motor Response: purposeful motor response; purposeful/movement localizing  Hand /Ankle Strength  Hand , Left: strong  Hand , Right: strong  Dorsiflexion, Left: moderate  Dorsiflexion, Right: moderate  Plantarflexion, Left:  moderate  Plantarflexion, Right: moderate  Intellectual Performance WDL  Level of Consciousness: Confusion  Respiratory  Respiratory WDL: WDL except; breath sounds; cough  Rhythm/Pattern, Respiratory: depth regular; unlabored  Cough Frequency: infrequent  Cough Type: congested; dry  Cough And Deep Breathing: done independently per patient  Breath Sounds  Breath Sounds: All Fields; RUL; RML; RLL  All Lung Fields Breath Sounds: Anterior:; diminished; wheezes, expiratory  RUL Breath Sounds: crackles, fine  RML Breath Sounds: crackles, fine  RLL Breath Sounds: crackles, fine  Cardiac  Cardiac WDL: WDL  ECG  Lead Monitored: Lead II  Rhythm: normal sinus rhythm  Peripheral Neurovascular  Peripheral Neurovascular WDL: WDL except; edema; pulse assessment  Pulse Assessment: dorsalis pedis; radial  VTE Prevention/Management: dorsiflexion/plantar flexion performed  Pulse Radial  Left Radial Pulse: 2+ (normal); palpation  Right Radial Pulse: 2+ (normal); palpation  Pulse Dorsalis Pedis  Left Dorsalis Pedis Pulse: 2+ (normal); palpation  Right Dorsalis Pedis Pulse: 2+ (normal); palpation  Edema  Edema: leg, left; leg, right; hip, left; hip, right  Hip, Left Edema: 3+ (Moderate)  Hip, Right Edema: 3+ (Moderate)  Leg, Left Edema: 3+ (Moderate)  Leg, Right Edema: 3+ (Moderate)  Gastrointestinal  Gastrointestinal WDL: WDL  Genitourinary  Genitourinary WDL: WDL except; urine; voiding ability/characteristics  Voiding Characteristics: oliguria; urethral catheter (bladder)  Urine Characteristics: luis alfredo  Skin  Skin WDL: WDL  Rogelio Risk Assessment  Sensory Perception: 3-->slightly limited  Moisture: 4-->rarely moist  Activity: 1-->bedfast  Mobility: 2-->very limited  Nutrition: 3-->adequate  Friction and Shear: 2-->potential problem  Rogelio Score: 15  Musculoskeletal  Musculoskeletal WDL: WDL except; all  General Mobility: no overt deficits noted  Range of Motion: active ROM (range of motion) encouraged                History & Physical       Tejinder Kent MD at 06/01/21 2256          .      [facility] Medicine Services  HISTORY AND PHYSICAL    Date of Admission: 6/1/2021  Primary Care Physician: Bharati Dahl APRN    Subjective     Chief Complaint: Confusion    History of Present Illness  Patient is a 58-year-old white female history of end-stage renal disease on Tuesday Thursday Saturday dialysis for which she is often noncompliant.  She was at dialysis today when she started becoming somewhat confused the group there thought she was hypoglycemic and gave her glucose which improved some but she continued to have issues and was sent to the emergency room for further evaluation.  In the ER she continued to be hypoglycemic despite D50 and confused.  She was given more boluses of D50 and finally placed on a D10 drip as well as given Narcan which did show a little bit of improvement but not enough to correct her situation.  She was ultimately placed on a Narcan drip and a central line was placed.  Her urine drug screen was only positive for oxycodone which she does have a prescription for as well as for Lyrica these.  The only medicines she is on that can cause confusion at least on her med list.  CT scans of her head abdomen and pelvis as well as chest are unremarkable with the exception of chest CT which shows aspiration in the right lower and middle lobes versus mucous plugging.  They also show evidence of volume overload and anasarca.  Her initial labs were all hemolyzed apparently so they had to be repeated show a creatinine of 3.3 which would expect after dialysis electrolytes were unremarkable white count is low at 3.43 with a left shift previous white count was 6.  She is afebrile and apparently her blood pressure was somewhat labile in the emergency room it was initially thought her sodium was 117 but it turned out to be 133.  Uncertain if this is medication effect or uremia less likely since she just  dialyzed or infectious process.  Unable to get any history out of the patient she will be then to the ICU for closer monitoring we will culture blood and sputum as well empiric antibiotics for now until he can better get a story from her.        Review of Systems   Unable to obtain patient is encephalopathic  Otherwise complete ROS reviewed and negative except as mentioned in the HPI.    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    • Chronic otitis media    • Diabetes (CMS/[facility])    • End stage renal disease on dialysis (CMS/[facility])    • Eustachian tube dysfunction    • GERD (gastroesophageal reflux disease)    • Glaucoma    • HTN (hypertension)    • Hyperlipidemia    • Mucoid otitis media    • Noncompliance of patient with renal dialysis (CMS/[facility])    • Tobacco abuse      Past Surgical History:  Past Surgical History:   Procedure Laterality Date   • ARTERIOVENOUS FISTULA     • CATARACT EXTRACTION WITH INTRAOCULAR LENS IMPLANT     •  SECTION     • CHOLECYSTECTOMY     • MYRINGOTOMY W/ TUBES Bilateral    • MYRINGOTOMY W/ TUBES Right    • TUBAL ABDOMINAL LIGATION       Social History:  reports that she has been smoking cigarettes. She has a 12.00 pack-year smoking history. She has never used smokeless tobacco. She reports that she does not drink alcohol and does not use drugs.    Family History: family history includes Diabetes in her father; Heart disease in her mother.       Allergies:  Allergies   Allergen Reactions   • Bupropion Nausea Only   • Chantix [Varenicline] Other (See Comments)     Does not remember   • Gabapentin Hallucinations     hallucinations   • Azithromycin Rash   • Levofloxacin Rash   • Penicillins Rash   • Sulfa Antibiotics Rash       Medications:  Prior to Admission medications    Medication Sig Start Date End Date Taking? Authorizing Provider   Ferric Citrate 1  MG(Fe) tablet Take  by mouth.   Yes Provider,  MD Steve   metoclopramide (REGLAN) 5 MG tablet Take 5 mg by mouth 4 (Four) Times a Day Before Meals & at Bedtime.   Yes Steve Warren MD   midodrine (PROAMATINE) 5 MG tablet Take 5 mg by mouth 3 (Three) Times a Day Before Meals.   Yes Steve Warren MD   traMADol (ULTRAM) 50 MG tablet Take 50 mg by mouth Every 6 (Six) Hours As Needed for Moderate Pain .   Yes Steve Warren MD   Travoprost (TRAVATAN OP) Apply  to eye(s) as directed by provider.   Yes Steve Warren MD   albuterol sulfate  (90 Base) MCG/ACT inhaler Inhale 2 puffs Every 4 (Four) Hours As Needed for Wheezing. 1/21/20   Sundeep Aldridge MD   aspirin 81 MG EC tablet Take 81 mg by mouth Daily.    Steve Warren MD   atropine 1 % ophthalmic solution Administer 1 drop into the left eye Daily.    Steve Warren MD   brimonidine (ALPHAGAN) 0.2 % ophthalmic solution Administer 1 drop into the left eye 3 (Three) Times a Day.    Steve Warren MD   carvedilol (COREG) 6.25 MG tablet Take 6.25 mg by mouth 2 (Two) Times a Day With Meals.    Steve Warren MD   cholecalciferol (VITAMIN D3) 1000 units tablet Take 2,000 Units by mouth Daily.    Steve Warren MD   dorzolamide (TRUSOPT) 2 % ophthalmic solution Administer 1 drop into the left eye 2 (Two) Times a Day.    Steve Warren MD   famotidine (PEPCID) 20 MG tablet Take 20 mg by mouth 2 (Two) Times a Day.    Steve Warren MD   fluticasone (FLONASE) 50 MCG/ACT nasal spray 2 sprays into the nostril(s) as directed by provider 2 (Two) Times a Day.    Steve Warren MD   insulin glargine (LANTUS) 100 UNIT/ML injection Inject 10 Units under the skin Every Night.    Steve Warren MD   ipratropium-albuterol (DUO-NEB) 0.5-2.5 mg/3 ml nebulizer Take 3 mL by nebulization Every 4 (Four) Hours As Needed for Wheezing or Shortness of Air.    Steve Warren MD   lactobacillus acidophilus (RISAQUAD) capsule  "capsule Take 1 capsule by mouth Daily. 5/3/20   Lyndon Lockett MD   levothyroxine (SYNTHROID, LEVOTHROID) 25 MCG tablet Take 1 tablet by mouth Daily. 3/28/21   Roxie Schneider APRN   losartan (COZAAR) 50 MG tablet Take 50 mg by mouth 2 (Two) Times a Day.    Steve Warren MD   ondansetron (ZOFRAN) 4 MG tablet Take 1 tablet by mouth Every 6 (Six) Hours As Needed for Nausea or Vomiting. 4/29/20   Lyndon Lockett MD   pantoprazole (PROTONIX) 40 MG EC tablet Take 1 tablet by mouth Daily. 4/8/21   Fuentes Cartagena DO   pravastatin (PRAVACHOL) 10 MG tablet Take 10 mg by mouth Every Night.    Steve Warren MD   pregabalin (LYRICA) 50 MG capsule Take 50 mg by mouth 2 (Two) Times a Day.    Steve Warren MD   sertraline (ZOLOFT) 25 MG tablet Take 25 mg by mouth Daily.    Steve Warren MD   sevelamer (RENVELA) 800 MG tablet Take 2,400 mg by mouth 3 (Three) Times a Day With Meals.    Steve Warren MD   timolol (TIMOPTIC) 0.5 % ophthalmic solution Administer 1 drop into the left eye Every Morning.    Steve Warren MD I have utilized all available immediate resources to obtain, update, and review the patient's current medications.    Objective     Vital Signs: BP (!) 142/108   Pulse 80   Temp 96.9 °F (36.1 °C) (Axillary)   Resp 13   Ht 139.7 cm (55\")   Wt 65.1 kg (143 lb 9.6 oz)   SpO2 96%   BMI 33.38 kg/m²   Physical Exam   Gen.:  Well-nourished well-developed white female agitated at times  HEENT: Atraumatic, normocephalic.  Pupils equal, round, and reactive to light. Extraocular movements intact.  Sclerae anicteric.  External ears negative.  Mucous membranes moist.  Neck is supple without lymphadenopathy.  No JVD is noted.  No carotid bruits are auscultated.  Oropharynx is without erythema or exudate.   Chest: Coarse breath sounds deloris.  CV: Regular rate and rhythm.  Normal S1-S2.  No gallops, murmurs. or rubs.  Abdomen: Soft, nontender, nondistended.  Active bowel " sounds,  No hepatosplenomegaly.  No masses.  No hernias.  Extremities: No clubbing, edema, or cyanosis.  Capillary refill is normal.  Pulses are 2+ and symmetric at radial and dorsalis pedis.  Posterior tibial pulses are intact and 2+ palpable.  Neuro: Patient is awake, alert, and oriented ×0.  Cranial nerves II through XII are grossly intact.  Motor is 5 out of 5 bilaterally.  DTRs are 2+ and symmetric bilaterally. Sensory exam is nonfocal  Skin: Warm, dry, and intact.  No evidence of breakdown.  Sensorium: Cephalopathic    Nursing notes and vital signs reviewed        Results Reviewed:  Lab Results (last 24 hours)     Procedure Component Value Units Date/Time    POC Glucose Once [[id number]]  (Abnormal) Collected: 06/02/21 0407    Specimen: Blood Updated: 06/02/21 0418     Glucose 198 mg/dL      Comment: : RONNA Smith ID: [id number]       Comprehensive Metabolic Panel [[id number]]  (Abnormal) Collected: 06/02/21 0318    Specimen: Blood Updated: 06/02/21 0404     Glucose 200 mg/dL      BUN 25 mg/dL      Creatinine 3.90 mg/dL      Sodium 131 mmol/L      Potassium 3.6 mmol/L      Chloride 92 mmol/L      CO2 29.0 mmol/L      Calcium 8.3 mg/dL      Total Protein 5.2 g/dL      Albumin 2.90 g/dL      ALT (SGPT) 14 U/L      AST (SGOT) 17 U/L      Alkaline Phosphatase 206 U/L      Total Bilirubin 0.3 mg/dL      eGFR Non African Amer 12 mL/min/1.73      Comment: <15 Indicative of kidney failure.        eGFR   Amer --     Comment: <15 Indicative of kidney failure.        Globulin 2.3 gm/dL      A/G Ratio 1.3 g/dL      BUN/Creatinine Ratio 6.4     Anion Gap 10.0 mmol/L     Narrative:      GFR Normal >60  Chronic Kidney Disease <60  Kidney Failure <15      Phosphorus [[id number]]  (Abnormal) Collected: 06/02/21 0318    Specimen: Blood Updated: 06/02/21 0404     Phosphorus 4.8 mg/dL     Magnesium [[id number]]  (Normal) Collected: 06/02/21 0318    Specimen: Blood Updated: 06/02/21 0404     Magnesium  1.8 mg/dL     CBC Auto Differential [[id number]]  (Abnormal) Collected: 06/02/21 0318    Specimen: Blood Updated: 06/02/21 0358     WBC 2.93 10*3/mm3      RBC 2.90 10*6/mm3      Hemoglobin 9.1 g/dL      Hematocrit 28.3 %      MCV 97.6 fL      MCH 31.4 pg      MCHC 32.2 g/dL      RDW 17.5 %      RDW-SD 62.0 fl      MPV 9.1 fL      Platelets 175 10*3/mm3      Neutrophil % 74.1 %      Lymphocyte % 10.9 %      Monocyte % 12.3 %      Eosinophil % 1.7 %      Basophil % 0.3 %      Neutrophils, Absolute 2.17 10*3/mm3      Lymphocytes, Absolute 0.32 10*3/mm3      Monocytes, Absolute 0.36 10*3/mm3      Eosinophils, Absolute 0.05 10*3/mm3      Basophils, Absolute 0.01 10*3/mm3     Hemoglobin A1c [[id number]]  (Abnormal) Collected: 06/01/21 1759    Specimen: Blood Updated: 06/02/21 0331     Hemoglobin A1C 5.70 %     Narrative:      Hemoglobin A1C Ranges:    Increased Risk for Diabetes  5.7% to 6.4%  Diabetes                     >= 6.5%  Diabetic Goal                < 7.0%    POC Glucose Once [[id number]]  (Abnormal) Collected: 06/02/21 0218    Specimen: Blood Updated: 06/02/21 0229     Glucose 194 mg/dL      Comment: : RONNA Smith ID: [id number]       POC Glucose Once [[id number]]  (Abnormal) Collected: 06/01/21 2247    Specimen: Blood Updated: 06/01/21 2258     Glucose 179 mg/dL      Comment: : RONNA Smith ID: [id number]       POC Glucose Once [[id number]]  (Abnormal) Collected: 06/01/21 2135    Specimen: Blood Updated: 06/01/21 2146     Glucose 158 mg/dL      Comment: : [id number] Reynaga justinMeter ID: [id number]       [place] Draw [[id number]] Collected: 06/01/21 1642    Specimen: Blood Updated: 06/01/21 2045    Narrative:      The following orders were created for panel order [place] Draw.  Procedure                               Abnormality         Status                     ---------                               -----------         ------                     Light Blue  "Top[[id number]]                                   Edited Result - FINAL      Green Top (Gel)[[id number]]                                  Edited Result - FINAL      Lavender Top[[id number]]                                     Final result               Red Top[[id number]]                                          Final result               Sanchez Top[[id number]]                                         Final result                 Please view results for these tests on the individual orders.    Sanchez Top [[id number]] Collected: 06/01/21 1642    Specimen: Blood Updated: 06/01/21 2045     Extra Tube Hold for add-ons.     Comment: Auto resulted.       Procalcitonin [[id number]]  (Abnormal) Collected: 06/01/21 1759    Specimen: Blood Updated: 06/01/21 1948     Procalcitonin 0.34 ng/mL     Narrative:      As a Marker for Sepsis (Non-Neonates):     1. <0.5 ng/mL represents a low risk of severe sepsis and/or septic shock.  2. >2 ng/mL represents a high risk of severe sepsis and/or septic shock.    As a Marker for Lower Respiratory Tract Infections that require antibiotic therapy:  PCT on Admission     Antibiotic Therapy             6-12 Hrs later  >0.5                          Strongly Recommended            >0.25 - <0.5             Recommended  0.1 - 0.25                  Discouraged                       Remeasure/reassess PCT  <0.1                         Strongly Discouraged         Remeasure/reassess PCT      As 28 day mortality risk marker: \"Change in Procalcitonin Result\" (>80% or <=80%) if Day 0 (or Day 1) and Day 4 values are available. Refer to http://www.[employer]-pct-calculator.com/    Change in PCT <=80 %   A decrease of PCT levels below or equal to 80% defines a positive change in PCT test result representing a higher risk for 28-day all-cause mortality of patients diagnosed with severe sepsis or septic shock.    Change in PCT >80 %   A decrease of PCT levels of more than 80% defines a negative change in PCT result " representing a lower risk for 28-day all-cause mortality of patients diagnosed with severe sepsis or septic shock.                Urine Drug Screen - Urine, Clean Catch [[id number]]  (Abnormal) Collected: 06/01/21 1925    Specimen: Urine, Clean Catch Updated: 06/01/21 1945     THC, Screen, Urine Negative     Phencyclidine (PCP), Urine Negative     Cocaine Screen, Urine Negative     Methamphetamine, Ur Negative     Opiate Screen Negative     Amphetamine Screen, Urine Negative     Benzodiazepine Screen, Urine Negative     Tricyclic Antidepressants Screen Negative     Methadone Screen, Urine Negative     Barbiturates Screen, Urine Negative     Oxycodone Screen, Urine Positive     Propoxyphene Screen Negative     Buprenorphine, Screen, Urine Negative    Narrative:      Cutoff For Drugs Screened:    Amphetamines               500 ng/ml  Barbiturates               200 ng/ml  Benzodiazepines            150 ng/ml  Cocaine                    150 ng/ml  Methadone                  200 ng/ml  Opiates                    100 ng/ml  Phencyclidine               25 ng/ml  THC                            50 ng/ml  Methamphetamine            500 ng/ml  Tricyclic Antidepressants  300 ng/ml  Oxycodone                  100 ng/ml  Propoxyphene               300 ng/ml  Buprenorphine               10 ng/ml    The normal value for all drugs tested is negative. This report includes unconfirmed screening results, with the cutoff values listed, to be used for medical treatment purposes only.  Unconfirmed results must not be used for non-medical purposes such as employment or legal testing.  Clinical consideration should be applied to any drug of abuse test, particularly when unconfirmed results are used.      Urinalysis, Microscopic Only - Urine, Catheter [[id number]]  (Abnormal) Collected: 06/01/21 1925    Specimen: Urine, Catheter Updated: 06/01/21 1944     RBC, UA 0-2 /HPF      WBC, UA 3-5 /HPF      Bacteria, UA None Seen /HPF      Squamous  Epithelial Cells, UA 0-2 /HPF      Hyaline Casts, UA 0-2 /LPF      Methodology Automated Microscopy    Urinalysis With Culture If Indicated - Urine, Catheter [[id number]]  (Abnormal) Collected: 06/01/21 1925    Specimen: Urine, Catheter Updated: 06/01/21 1944     Color, UA Dark Yellow     Appearance, UA Clear     pH, UA 5.5     Specific Gravity, UA 1.018     Glucose, UA Negative     Ketones, UA Negative     Bilirubin, UA Negative     Blood, UA Trace     Protein, UA >=300 mg/dL (3+)     Leuk Esterase, UA Small (1+)     Nitrite, UA Negative     Urobilinogen, UA 1.0 E.U./dL    Comprehensive Metabolic Panel [[id number]]  (Abnormal) Collected: 06/01/21 1910    Specimen: Blood Updated: 06/01/21 1936     Glucose 144 mg/dL      BUN 24 mg/dL      Creatinine 3.42 mg/dL      Sodium 131 mmol/L      Potassium 3.6 mmol/L      Chloride 89 mmol/L      CO2 26.0 mmol/L      Calcium 9.1 mg/dL      Total Protein 6.7 g/dL      Albumin 3.70 g/dL      ALT (SGPT) 17 U/L      AST (SGOT) 21 U/L      Alkaline Phosphatase 260 U/L      Total Bilirubin 0.4 mg/dL      eGFR Non African Amer 14 mL/min/1.73      Comment: <15 Indicative of kidney failure.        eGFR   Amer --     Comment: <15 Indicative of kidney failure.        Globulin 3.0 gm/dL      A/G Ratio 1.2 g/dL      BUN/Creatinine Ratio 7.0     Anion Gap 16.0 mmol/L     Narrative:      GFR Normal >60  Chronic Kidney Disease <60  Kidney Failure <15      Ammonia [[id number]]  (Normal) Collected: 06/01/21 1910    Specimen: Blood Updated: 06/01/21 1936     Ammonia 17 umol/L     Phosphorus [[id number]]  (Normal) Collected: 06/01/21 1759    Specimen: Blood Updated: 06/01/21 1935     Phosphorus 4.2 mg/dL     Ethanol [[id number]] Collected: 06/01/21 1759    Specimen: Blood Updated: 06/01/21 1933     Ethanol % <0.010 %     Narrative:      Not for legal purposes. Chain of Custody not followed.     Lactic Acid, Plasma [[id number]]  (Normal) Collected: 06/01/21 1911    Specimen: Blood Updated:  06/01/21 1932     Lactate 1.0 mmol/L     Magnesium [[id number]]  (Normal) Collected: 06/01/21 1759    Specimen: Blood Updated: 06/01/21 1932     Magnesium 1.9 mg/dL     aPTT [[id number]]  (Abnormal) Collected: 06/01/21 1910    Specimen: Blood Updated: 06/01/21 1928     PTT 49.2 seconds     Protime-INR [[id number]]  (Abnormal) Collected: 06/01/21 1910    Specimen: Blood Updated: 06/01/21 1928     Protime 14.2 Seconds      INR 1.19    Lactic Acid, Plasma [[id number]] Collected: 06/01/21 1642    Specimen: Blood Updated: 06/01/21 1853     Lactate --     Comment: Corrected results  Corrected result. Previous result was 0.7 mmol/L on 6/1/2021 at 1710 CDT.       Magnesium [[id number]] Collected: 06/01/21 1642    Specimen: Blood Updated: 06/01/21 1853     Magnesium --     Comment: Corrected results  Corrected result. Previous result was 1.6 mg/dL on 6/1/2021 at 1708 CDT.       Ethanol [[id number]] Collected: 06/01/21 1642    Specimen: Blood Updated: 06/01/21 1853     Ethanol % --     Comment: Corrected results  Corrected result. Previous result was <0.010 % on 6/1/2021 at 1709 CDT.       Narrative:      Not for legal purposes. Chain of Custody not followed.     Procalcitonin [[id number]] Collected: 06/01/21 1642    Specimen: Blood Updated: 06/01/21 1853     Procalcitonin --     Comment: Corrected results  Corrected result. Previous result was 0.26 ng/mL on 6/1/2021 at 1718 CDT.       Narrative:      As a Marker for Sepsis (Non-Neonates):     1. <0.5 ng/mL represents a low risk of severe sepsis and/or septic shock.  2. >2 ng/mL represents a high risk of severe sepsis and/or septic shock.    As a Marker for Lower Respiratory Tract Infections that require antibiotic therapy:  PCT on Admission     Antibiotic Therapy             6-12 Hrs later  >0.5                          Strongly Recommended            >0.25 - <0.5             Recommended  0.1 - 0.25                  Discouraged                       Remeasure/reassess PCT  <0.1      "                    Strongly Discouraged         Remeasure/reassess PCT      As 28 day mortality risk marker: \"Change in Procalcitonin Result\" (>80% or <=80%) if Day 0 (or Day 1) and Day 4 values are available. Refer to http://www.[facility]-pct-calculator.com/    Change in PCT <=80 %   A decrease of PCT levels below or equal to 80% defines a positive change in PCT test result representing a higher risk for 28-day all-cause mortality of patients diagnosed with severe sepsis or septic shock.    Change in PCT >80 %   A decrease of PCT levels of more than 80% defines a negative change in PCT result representing a lower risk for 28-day all-cause mortality of patients diagnosed with severe sepsis or septic shock.                Phosphorus [[id number]] Collected: 06/01/21 1642    Specimen: Blood Updated: 06/01/21 1853     Phosphorus --     Comment: Corrected results  Corrected result. Previous result was 3.5 mg/dL on 6/1/2021 at 1711 CDT.       Green Top (Gel) [[id number]] Collected: 06/01/21 1642    Specimen: Blood Updated: 06/01/21 1853     Extra Tube Hold for add-ons.     Comment: Auto resulted.       Protime-INR [[id number]] Collected: 06/01/21 1642    Specimen: Blood Updated: 06/01/21 1852     Protime --     Comment: Specimen contaminated  Corrected result. Previous result was 14.9 Seconds on 6/1/2021 at 1702 CDT.        INR --     Comment: Specimen contaminated  Corrected result. Previous result was 1.27 on 6/1/2021 at 1702 CDT.       aPTT [[id number]] Collected: 06/01/21 1642    Specimen: Blood Updated: 06/01/21 1852     PTT --     Comment: Specimen contaminated.  Corrected result. Previous result was 51.3 seconds on 6/1/2021 at 1702 CDT.       Light Blue Top [[id number]] Collected: 06/01/21 1642    Specimen: Blood Updated: 06/01/21 1852     Extra Tube hold for add-on     Comment: Auto resulted       Manual Differential [[id number]] Collected: 06/01/21 1642    Specimen: Blood Updated: 06/01/21 1841     Atypical Lymphocyte % " --     Comment: Corrected result. Previous result was 1.0 % on 6/1/2021 at 1735 CDT.        Neutrophils Absolute --     Comment: Corrected result. Previous result was 2.20 10*3/mm3 on 6/1/2021 at 1735 CDT.        Lymphocytes Absolute --     Comment: Corrected result. Previous result was 0.36 10*3/mm3 on 6/1/2021 at 1735 CDT.        Monocytes Absolute --     Comment: Corrected result. Previous result was 0.08 10*3/mm3 on 6/1/2021 at 1735 CDT.        Basophils Absolute --     Comment: Corrected result. Previous result was 0.06 10*3/mm3 on 6/1/2021 at 1735 CDT.        nRBC --     Comment: Corrected result. Previous result was 2.1 /100 WBC on 6/1/2021 at 1735 CDT.        RBC Morphology Normal     Comment: Appended report. These results have been appended to a previously final verified report.        Anisocytosis --     Comment: Corrected result. Previous result was Slight/1+ on 6/1/2021 at 1735 CDT.        Basophilic Stippling --     Comment: Corrected result. Previous result was Slight/1+ on 6/1/2021 at 1735 CDT.        Crenated RBC's --     Comment: Corrected result. Previous result was Slight/1+ on 6/1/2021 at 1735 CDT.        Polychromasia --     Comment: Corrected result. Previous result was Slight/1+ on 6/1/2021 at 1735 CDT.        WBC Morphology --     Comment: Corrected result. Previous result was Normal on 6/1/2021 at 1735 CDT.        Platelet Morphology --     Comment: Corrected result. Previous result was Normal on 6/1/2021 at 1735 CDT.        Neutrophil/Total Counted Leukocytes, Manual 0     Comment: Corrected results  Appended report. These results have been appended to a previously final verified report.       Narrative:      The previously reported component Neutrophils % is no longer being reported. Previous result was 81.4 % (Reference Range: 42.7-76.0 %) on 6/1/2021 at 1735 CDT.  The previously reported component Lymphocytes % is no longer being reported. Previous result was 12.4 % (Reference Range:  19.6-45.3 %) on 6/1/2021 at 1735 CDT.  The previously reported component Monocytes % is no longer being reported. Previous result was 3.1 % (Reference Range: 5.0-12.0 %) on 6/1/2021 at 1735 CDT.  The previously reported component Basophils % is no longer being reported. Previous result was 2.1 % (Reference Range: 0.0-1.5 %) on 6/1/2021 at 1735 CDT.    Comprehensive Metabolic Panel [[id number]]  (Abnormal) Collected: 06/01/21 1759    Specimen: Blood Updated: 06/01/21 1830     Glucose 126 mg/dL      BUN 23 mg/dL      Creatinine 3.30 mg/dL      Sodium 133 mmol/L      Potassium 3.7 mmol/L      Chloride 91 mmol/L      CO2 28.0 mmol/L      Calcium 9.1 mg/dL      Total Protein 6.7 g/dL      Albumin 3.90 g/dL      ALT (SGPT) 17 U/L      AST (SGOT) 20 U/L      Alkaline Phosphatase 263 U/L      Total Bilirubin 0.4 mg/dL      eGFR Non African Amer 14 mL/min/1.73      Comment: <15 Indicative of kidney failure.        eGFR   Amer --     Comment: <15 Indicative of kidney failure.        Globulin 2.8 gm/dL      A/G Ratio 1.4 g/dL      BUN/Creatinine Ratio 7.0     Anion Gap 14.0 mmol/L     Narrative:      GFR Normal >60  Chronic Kidney Disease <60  Kidney Failure <15      CBC & Differential [[id number]]  (Abnormal) Collected: 06/01/21 1759    Specimen: Blood Updated: 06/01/21 1830    Narrative:      The following orders were created for panel order CBC & Differential.  Procedure                               Abnormality         Status                     ---------                               -----------         ------                     CBC Auto Differential[[id number]]        Abnormal            Final result                 Please view results for these tests on the individual orders.    CBC Auto Differential [[id number]]  (Abnormal) Collected: 06/01/21 1759    Specimen: Blood Updated: 06/01/21 1830     WBC 3.43 10*3/mm3      RBC 3.27 10*6/mm3      Hemoglobin 10.1 g/dL      Hematocrit 31.8 %      MCV 97.2 fL      MCH 30.9 pg       MCHC 31.8 g/dL      RDW 17.6 %      RDW-SD 61.7 fl      MPV 9.1 fL      Platelets 273 10*3/mm3      Neutrophil % 76.4 %      Lymphocyte % 9.6 %      Monocyte % 10.8 %      Eosinophil % 2.6 %      Basophil % 0.3 %      Immature Grans % 0.3 %      Neutrophils, Absolute 2.62 10*3/mm3      Lymphocytes, Absolute 0.33 10*3/mm3      Monocytes, Absolute 0.37 10*3/mm3      Eosinophils, Absolute 0.09 10*3/mm3      Basophils, Absolute 0.01 10*3/mm3      Immature Grans, Absolute 0.01 10*3/mm3      nRBC 1.7 /100 WBC     CBC & Differential [[id number]] Collected: 06/01/21 1642    Specimen: Blood Updated: 06/01/21 1826    Narrative:      The following orders were created for panel order CBC & Differential.  Procedure                               Abnormality         Status                     ---------                               -----------         ------                     CBC Auto Differential[[id number]]                            Edited Result - FINAL        Please view results for these tests on the individual orders.    CBC Auto Differential [[id number]] Collected: 06/01/21 1642    Specimen: Blood Updated: 06/01/21 1826     WBC --     Comment: Corrected results  Corrected result. Previous result was 2.70 10*3/mm3 on 6/1/2021 at 1735 CDT.        RBC --     Comment: Corrected results  Corrected result. Previous result was 3.00 10*6/mm3 on 6/1/2021 at 1735 CDT.        Hemoglobin --     Comment: Corrected results  Corrected result. Previous result was 9.5 g/dL on 6/1/2021 at 1735 CDT.        Hematocrit --     Comment: Corrected results  Corrected result. Previous result was 30.7 % on 6/1/2021 at 1735 CDT.        MCV --     Comment: Corrected results  Corrected result. Previous result was 102.3 fL on 6/1/2021 at 1735 CDT.        MCH --     Comment: Corrected results  Corrected result. Previous result was 31.7 pg on 6/1/2021 at 1735 CDT.        MCHC --     Comment: Corrected results  Corrected result. Previous result was  30.9 g/dL on 6/1/2021 at 1735 CDT.        RDW --     Comment: Corrected results  Corrected result. Previous result was 18.0 % on 6/1/2021 at 1735 CDT.        RDW-SD --     Comment: Corrected result. Previous result was 65.1 fl on 6/1/2021 at 1735 CDT.        MPV --     Comment: Corrected result. Previous result was 9.1 fL on 6/1/2021 at 1735 CDT.        Platelets --     Comment: Corrected results  Corrected result. Previous result was 213 10*3/mm3 on 6/1/2021 at 1735 CDT.       Narrative:      The previously reported component NRBC is no longer being reported. Previous result was 3.0 /100 WBC (Reference Range: 0.0-0.2 /100 WBC) on 6/1/2021 at 1708 CDT.    Red Top [[id number]] Collected: 06/01/21 1642    Specimen: Blood Updated: 06/01/21 1745     Extra Tube Hold for add-ons.     Comment: Auto resulted.       Lavender Top [[id number]] Collected: 06/01/21 1642    Specimen: Blood Updated: 06/01/21 1745     Extra Tube hold for add-on     Comment: Auto resulted       Blood Culture - Blood, Arm, Right [[id number]] Collected: 06/01/21 1711    Specimen: Blood from Arm, Right Updated: 06/01/21 1717    Blood Culture - Blood, Arm, Right [[id number]] Collected: 06/01/21 1705    Specimen: Blood from Arm, Right Updated: 06/01/21 1717    Blood Gas, Arterial - [[id number]]  (Abnormal) Collected: 06/01/21 1655    Specimen: Arterial Blood Updated: 06/01/21 1658     Site Right Radial     Rustam's Test Positive     pH, Arterial 7.430 pH units      pCO2, Arterial 43.5 mm Hg      pO2, Arterial 63.3 mm Hg      Comment: 84 Value below reference range        HCO3, Arterial 28.8 mmol/L      Comment: 83 Value above reference range        Base Excess, Arterial 4.0 mmol/L      Comment: 83 Value above reference range        O2 Saturation, Arterial 93.8 %      Comment: 84 Value below reference range        Temperature 37.0 C      Barometric Pressure for Blood Gas 753 mmHg      Modality Room Air     FIO2 21 %      Ventilator Mode NA     Collected by  [id number]     Comment: Meter: [id number]     :  [id number]        pCO2, Temperature Corrected 43.5 mm Hg      pH, Temp Corrected 7.430 pH Units      pO2, Temperature Corrected 63.3 mm Hg     POC Glucose Once [[id number]]  (Abnormal) Collected: 06/01/21 1632    Specimen: Blood Updated: 06/01/21 1643     Glucose 47 mg/dL      Comment: : [id number] Pawan JessicaMeter ID: [id number]       POC Glucose Once [[id number]]  (Abnormal) Collected: 06/01/21 1630    Specimen: Blood Updated: 06/01/21 1643     Glucose 46 mg/dL      Comment: : [id number] Pawan JessicaMeter ID: [id number]           Imaging Results (Last 24 Hours)     Procedure Component Value Units Date/Time    CT Abdomen Pelvis Without Contrast [[id number]] Collected: 06/01/21 1952     Updated: 06/01/21 1957    Narrative:      CT ABDOMEN PELVIS WO CONTRAST- 6/1/2021 6:51 PM CDT     HISTORY: AMS; R41.82-Altered mental status, unspecified;  E16.2-Hypoglycemia, unspecified      COMPARISON: Chest CT dated 6/1/2021      DOSE LENGTH PRODUCT: 709 mGy cm. Automated exposure control was also  utilized to decrease patient radiation dose.     TECHNIQUE: Noncontrast enhanced images of the abdomen and pelvis  obtained without oral contrast. Multiplanar reformatted images were  provided for review.      FINDINGS:      LIVER: No focal liver lesion within limits of a noncontrast study.      BILIARY SYSTEM: The gallbladder is surgically absent. No intrahepatic or  extrahepatic ductal dilatation.      PANCREAS: No focal pancreatic lesion within limits of a noncontrast  study.      SPLEEN: Unremarkable.      KIDNEYS: Bilateral renal atrophy. No hydronephrosis. The ureters are  decompressed and normal in appearance.     ADRENALS: Unremarkable.     RETROPERITONEUM: No mass, lymphadenopathy or hemorrhage.      GI TRACT: The stomach and small bowel are unremarkable. Moderate colonic  stool. No inflammatory changes along the bowel.      OTHER: Bilateral upper thigh and  diffuse body wall subcutaneous edema  reflecting systemic volume overload. Trace ascites which I suspect is  also related to the systemic volume overload. No acute bony abnormality.     PELVIS: No mass lesion, fluid collection or significant lymphadenopathy  is seen in the pelvis. The urinary bladder is normal in appearance.       Impression:      1. Anasarca. Otherwise, no acute process involving the abdomen or  pelvis.  This report was finalized on 06/01/2021 19:54 by Dr Jhonatan Berry, .    CT Chest Without Contrast Diagnostic [[id number]] Collected: 06/01/21 1948     Updated: 06/01/21 1955    Narrative:      CT CHEST WO CONTRAST DIAGNOSTIC- 6/1/2021 6:51 PM CDT     HISTORY: AMS; R41.82-Altered mental status, unspecified;  E16.2-Hypoglycemia, unspecified     COMPARISON: CT scan dated 1/19/2020     DOSE LENGTH PRODUCT: 257 mGy cm. Automated exposure control was also  utilized to decrease patient radiation dose.     TECHNIQUE: Serial helical tomographic images of the chest were acquired  without contrast. Multiplanar reformatted images were provided for  review.     FINDINGS:     Visualized neck base: The imaged portion of the base of the neck appears  unremarkable.      Lungs: Right middle and right lower lobe airways are completely  opacified with debris and there is atelectasis of both of these lobes.  Interlobular septal thickening and peribronchial thickening with  bilateral groundglass opacities, reflecting pulmonary edema. There is a  moderate to large pleural effusion on the right. 7 mm left upper lobe  pulmonary nodule (series 3-image 47).     Heart: Prominent four-chamber cardiomegaly. There is no pericardial  effusion. Advanced coronary artery atheromatous calcification.     Vasculature: There is calcified atheromatous plaque in the aorta without  aneurysmal dilation. The pulmonary arteries are enlarged, suggestive of  pulmonary arterial hypertension.     Mediastinum and lymph nodes: No suspicious hilar or  mediastinal  adenopathy..     Skeletal and soft tissues: Diffuse chest wall edema. Comminuted  fracturing of the right proximal humerus.       Impression:      1. Volume overload with interstitial and casey pulmonary edema. There is  also a moderate to large right pleural effusion.  2. There is debris diffusely opacifying the right middle and right lower  lobe airways with atelectasis of both of these lobes, suspected mucous  plugging or aspirated contents.  3. Comminuted fracturing of the right proximal humerus.  This report was finalized on 06/01/2021 19:52 by Dr Jhonatan Berry, .    CT Head Without Contrast [[id number]] Collected: 06/01/21 1930     Updated: 06/01/21 1934    Narrative:      CT HEAD WO CONTRAST- 6/1/2021 6:51 PM CDT     HISTORY: Mental status change, unknown cause; R41.82-Altered mental  status, unspecified; E16.2-Hypoglycemia, unspecified       DOSE LENGTH PRODUCT: 668 mGy cm. Automated exposure control was also  utilized to decrease patient radiation dose.     Technique:   Axial CT of the brain without IV contrast. Sagittal and coronal  reformations are also provided for review. Soft tissue and bone kernels  are available for interpretation.     Comparison: CT scan dated 4/3/2021.     Findings:      There is no evidence of acute large vascular territory infarct. No  intra-axial or extra-axial hemorrhage. No visualized mass lesion or mass  effect. The ventricles, cortical sulci and basal cisterns are symmetric  and age appropriate.  Posterior fossa structures are unremarkable. The  scalp and calvarium are intact. Visualized paranasal sinuses and  mastoids are clear.        Impression:      Impression:    1. No acute intracranial process. Stable exam.  This report was finalized on 06/01/2021 19:31 by Dr Jhonatan Berry, .    XR Chest 1 View [[id number]] Collected: 06/01/21 1834     Updated: 06/01/21 1837    Narrative:      Frontal upright radiograph of the chest 6/1/2021 6:10 PM CDT     HISTORY: Central  line     COMPARISON: 6/1/2021.       Impression:      FINDINGS/IMPRESSION:      Right IJ central line is in good position. Otherwise stable. No  pneumothorax.  This report was finalized on 06/01/2021 18:34 by Dr Jhonatan Berry, .    XR Chest 1 View [[id number]] Collected: 06/01/21 1715     Updated: 06/01/21 1720    Narrative:      Frontal upright radiograph of the chest 6/1/2021 5:12 PM CDT     HISTORY: Altered mental status     COMPARISON: Chest exam dated 5/15/2021.     FINDINGS:      Stable moderate to large layering right pleural effusion with continued  opacification of the right lung base. Left lung is clear. Heart size is  stable. The pulmonary vasculature are nondilated. No pneumothorax.  Partially imaged impacted fracture across the surgical neck of the right  proximal humerus.       Impression:      1. No significant interval change from the recent comparison exam. There  is a moderate to large layering right pleural effusion with  opacification of the right lung base.        This report was finalized on 06/01/2021 17:17 by Dr Jhonatan Berry, .        I have personally reviewed and interpreted the radiology studies and ECG obtained at time of admission.     Assessment / Plan     Assessment:   Active Hospital Problems    Diagnosis    • **Altered mental status    • Sepsis (CMS/HCC)    • Hypoglycemia    • End stage renal failure on dialysis (CMS/HCC)    • Diabetes (CMS/HCC)    • HTN (hypertension)    1.  Altered mental status/encephalopathy uncertain if this is related to medication effect glucose regulation or infection.  We will hold all sedating medications serial neuro checks in ICU.  Culture blood sputum since it may be an aspiration event as well.  Will start broad-spectrum antibiotics for now monitor volume status.  Will DC Narcan drip as it does not work.  Her sugars have come up so we will change her D10 over to D5 normal saline since her sodium is low.do not want to drop it down any further.  2.   End-stage renal disease on dialysis consult nephrology for assistance.  3.  Type 2 diabetes with hypoglycemia hold long-acting insulin and oral hypoglycemics will do Accu-Chek sliding scale insulin continue D5 for now.  4.  Sepsis uncertain if this is the case or not we will culture blood and sputum antibiotics as above.    Patient seen prior to midnight       Code Status/Advanced Care Plan: Full    The patient's surrogate decision maker is unable to determine at this time.     I discussed my findings and recommendations with the nursing staff.    Estimated length of stay is to be determined.     The patient was seen and examined by me on June 1, 2021 at 10:50 PM.    Electronically signed by Tejinder Kent MD, 06/02/21, 07:10 CDT.        Electronically signed by Tejinder Kent MD at 06/02/21 0723          Emergency Department Notes      Maverick Edwards MD at 06/01/21 1649      Procedure Orders    1. Central Line At Bedside [[id number]] ordered by Maverick Edwards MD               Subjective   Patient is a 58-year-old dialysis patient who went to dialysis center today was somewhat confused but to give her oral glucose which improved her condition after dialysis she was much more confused blood sugar was low subsequent sent to the ED for evaluation in the ER was given an amp of D50 with some improvement but predominant improvement noted with Narcan x2 now she is answering questions.  Was placed on Narcan drip.      Altered Mental Status  Presenting symptoms: behavior changes, confusion, lethargy and partial responsiveness    Severity:  Moderate  Most recent episode:  Today  Episode history:  Single  Timing:  Constant  Progression:  Waxing and waning  Chronicity:  New  Context: drug use    Context: not alcohol use, not dementia, not head injury, not homeless, not nursing home resident, not recent change in medication and not recent illness    Associated symptoms: weakness    Associated symptoms: no abdominal pain,  normal movement, no difficulty breathing, no headaches, no slurred speech and no vomiting        Review of Systems   Unable to perform ROS: Mental status change   Gastrointestinal: Negative for abdominal pain and vomiting.   Neurological: Positive for weakness. Negative for headaches.   Psychiatric/Behavioral: Positive for confusion.       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    • Chronic otitis media    • Diabetes (CMS/Formerly [facility])    • End stage renal disease on dialysis (CMS/Formerly [facility])    • Eustachian tube dysfunction    • GERD (gastroesophageal reflux disease)    • Glaucoma    • HTN (hypertension)    • Hyperlipidemia    • Mucoid otitis media    • Noncompliance of patient with renal dialysis (CMS/Formerly [facility])    • Tobacco abuse        Allergies   Allergen Reactions   • Bupropion Nausea Only   • Chantix [Varenicline] Other (See Comments)     Does not remember   • Azithromycin Rash   • Levofloxacin Rash   • Penicillins Rash   • Sulfa Antibiotics Rash       Past Surgical History:   Procedure Laterality Date   • ARTERIOVENOUS FISTULA     • CATARACT EXTRACTION WITH INTRAOCULAR LENS IMPLANT     •  SECTION     • CHOLECYSTECTOMY     • MYRINGOTOMY W/ TUBES Bilateral    • MYRINGOTOMY W/ TUBES Right    • TUBAL ABDOMINAL LIGATION         Family History   Problem Relation Age of Onset   • Heart disease Mother    • Diabetes Father    • Colon cancer Neg Hx    • Colon polyps Neg Hx        Social History     Socioeconomic History   • Marital status: Single     Spouse name: Not on file   • Number of children: Not on file   • Years of education: Not on file   • Highest education level: Not on file   Tobacco Use   • Smoking status: Current Every Day Smoker     Packs/day: 2.00     Years: 6.00     Pack years: 12.00     Types: Cigarettes   • Smokeless tobacco: Never Used   Vaping Use   • Vaping Use: Never used   Substance and Sexual Activity   • Alcohol use: No   • Drug use: No   • Sexual  activity: Defer           Objective   Physical Exam  Vitals and nursing note reviewed. Exam conducted with a chaperone present.   Constitutional:       General: She is not in acute distress.     Appearance: She is well-developed and underweight. She is not toxic-appearing or diaphoretic.      Comments: Lethargic difficult to arouse   HENT:      Head: Normocephalic and atraumatic.      Comments: No hemotympanum     Mouth/Throat:      Mouth: Mucous membranes are moist.      Pharynx: Oropharynx is clear.   Eyes:      General: Lids are normal. Lids are everted, no foreign bodies appreciated.      Pupils: Pupils are equal, round, and reactive to light.   Neck:      Thyroid: No thyromegaly.      Vascular: Normal carotid pulses. No carotid bruit or JVD.      Trachea: Trachea and phonation normal. No tracheal tenderness or tracheal deviation.      Meningeal: Brudzinski's sign and Kernig's sign absent.      Comments: No cervical spine tenderness  Cardiovascular:      Rate and Rhythm: Normal rate and regular rhythm.      Pulses: Normal pulses.      Heart sounds: Normal heart sounds.      Comments: Hypotensive  Pulmonary:      Effort: Pulmonary effort is normal. Bradypnea present. No tachypnea or respiratory distress.      Breath sounds: No stridor. Examination of the right-middle field reveals decreased breath sounds. Examination of the right-lower field reveals decreased breath sounds. Examination of the left-lower field reveals wheezing. Decreased breath sounds and wheezing present.      Comments: Midline trachea  Abdominal:      General: Abdomen is flat. Bowel sounds are normal. There is no distension.      Palpations: Abdomen is soft. There is no mass.      Tenderness: There is no abdominal tenderness. There is no guarding.      Comments: Soft nontender   Musculoskeletal:         General: Normal range of motion.      Cervical back: Full passive range of motion without pain, normal range of motion and neck supple. No  rigidity.   Skin:     General: Skin is warm and dry.      Capillary Refill: Capillary refill takes less than 2 seconds.      Coloration: Skin is not pale.      Nails: There is no clubbing.   Neurological:      General: No focal deficit present.      Mental Status: She is lethargic, disoriented and confused.      GCS: GCS eye subscore is 3. GCS verbal subscore is 4. GCS motor subscore is 5.      Cranial Nerves: Cranial nerves are intact. No cranial nerve deficit.      Sensory: Sensation is intact. No sensory deficit.      Motor: Motor function is intact. No abnormal muscle tone.      Deep Tendon Reflexes: Reflexes are normal and symmetric. Reflexes normal. Babinski sign absent on the right side. Babinski sign absent on the left side.      Reflex Scores:       Bicep reflexes are 2+ on the right side and 2+ on the left side.       Patellar reflexes are 2+ on the right side and 2+ on the left side.     Comments: Focal neurological deficits orientation could not be checked the patient is confused   Psychiatric:         Behavior: Behavior is uncooperative.         Central Line At Bedside    Date/Time: 6/1/2021 6:03 PM  Performed by: Maverick Edwards MD  Authorized by: Benitez Farrell MD     Consent:     Consent obtained:  Emergent situation    Consent given by: Patient is confused encephalopathic no family members available require central line for hypertonic saline.    Risks discussed:  Arterial puncture, bleeding, infection, incorrect placement, nerve damage and pneumothorax    Alternatives discussed:  Delayed treatment  Pre-procedure details:     Hand hygiene: Hand hygiene performed prior to insertion      Sterile barrier technique: All elements of maximal sterile technique followed      Skin preparation:  2% chlorhexidine  Anesthesia (see MAR for exact dosages):     Anesthesia method:  Local infiltration    Local anesthetic:  Lidocaine 1% w/o epi  Procedure details:     Location:  R internal jugular    Patient  position:  Flat    Procedural supplies:  Triple lumen    Catheter size:  7.5 Fr    Landmarks identified: yes      Ultrasound guidance: yes      Sterile ultrasound techniques: Sterile gel and sterile probe covers were used      Number of attempts:  1    Successful placement: yes    Post-procedure details:     Post-procedure:  Dressing applied    Assessment:  Blood return through all ports, free fluid flow, placement verified by x-ray and no pneumothorax on x-ray    Patient tolerance of procedure:  Tolerated well, no immediate complications              ED Course  ED Course as of Jun 01 1807   Tue Jun 01, 2021 1718 Patient came in with hypoglycemia probably under the influence of narcotics which improved with dextrose and naloxone.  Naloxone drip has been ordered D10 drip has been ordered her blood pressure has been fluctuant from systolic 170 to a systolic of 86.  She is somewhat confused lab work-up is pending on this patient.    [TS]   1719 Large pleural effusion on the right side has been seen in the past also    [TS]   1720 I think keeping consideration the patient's flexion blood pressure hypoglycemia and her bradypnea this could be related to narcotics waiting on the urine drug screen come back and see any obvious evidence of infection on clinical examination she is not febrile.  Encephalopathic may be.    [TS]   1723 EKG showed normal sinus rhythm incomplete right bundle branch block    [TS]   1733 With altered mental status encephalopathic which could be related to multifactorial etiologies polypharmacy uremia hypoglycemia and sedative use.    [TS]   1745 Patient sodium deficit is 664.2 mEq    [TS]   1745 The lab informed us that the patient's sodium 117 therefore a central line was placed by me the blood will be recollected    [TS]   1806 The lab is not releasing her chemistries therefore we will redraw them and wait for hypertonic saline infusion till the time you get the repeat labs    [TS]   1807 Also  holding her Narcan because it is making her agitated.  We can give her  Zyprexa    [TS]      ED Course User Index  [TS] Maverick Edwards MD                                           MDM  Number of Diagnoses or Management Options  Diagnosis management comments: Differential Diagnosis:  I considered toxic-metabolic etiology, hypoglycemia, hyperglycemia, diabetic ketoacidosis, drug overdose, ethanol intoxication, thiamine deficiency, hypothermia, hyponatremia, hypernatremia, organ failure, liver failure, kidney failure, thyroid failure, adrenal failure, hypoxia, hypercarbia, ischemic stroke, intracranial bleed, subarachnoid hemorrhage, closed head injury, subdural hematoma, seizure activity, syncopal episode, infectious etiology, hypertensive encephalopathy, vasculitis, thrombotic thrombocytopenic purpura and disseminated intravascular coagulation as a possible cause of altered mental status in this patient. This is a partial list of diagnoses considered.              Amount and/or Complexity of Data Reviewed  Clinical lab tests: ordered and reviewed  Tests in the radiology section of CPT®: ordered and reviewed  Tests in the medicine section of CPT®: reviewed and ordered    Risk of Complications, Morbidity, and/or Mortality  Presenting problems: moderate  Diagnostic procedures: moderate  Management options: moderate        Final diagnoses:   Altered mental status, unspecified altered mental status type   Hypoglycemia       ED Disposition  ED Disposition     None          No follow-up provider specified.       Medication List      No changes were made to your prescriptions during this visit.          Maverick Edwards MD  06/01/21 1734       Maverick Edwards MD  06/01/21 1803       Maverick Edwards MD  06/01/21 1807      Electronically signed by Maverick Edwards MD at 06/01/21 1807     Benitez Farrell MD at 06/01/21 2045        I took over the care of this patient from Dr. Chino at shift change.  The overall picture could  be 1 of impending sepsis as the CT scan did seem to show aspiration and the patient is currently hypothermic.  Overall I think it behooves us to admit the patient to the ICU and initiate empiric antibiotics.  The patient is still somewhat agitated but has relatively stable vitals otherwise.  Please see Dr. Chino's note for the full history and physical.  I discussed the case with Dr. Kent who kindly agreed to admit the patient under his care.     Benitez Farrell MD  06/01/21 2046      Electronically signed by Benitez Farrell MD at 06/01/21 2046         Facility-Administered Medications as of 6/2/2021   Medication Dose Route Frequency Provider Last Rate Last Admin   • acetaminophen (TYLENOL) tablet 650 mg  650 mg Oral Q4H PRN Tejinder Kent MD        Or   • acetaminophen (TYLENOL) suppository 650 mg  650 mg Rectal Q4H PRN Tejinder Kent MD       • aspirin EC tablet 81 mg  81 mg Oral Daily Tejinder Kent MD       • atropine 1 % ophthalmic solution 1 drop  1 drop Left Eye Daily Tejinder Kent MD   1 drop at 06/02/21 0914   • brimonidine (ALPHAGAN) 0.2 % ophthalmic solution 1 drop  1 drop Left Eye TID Tejinder Kent MD   1 drop at 06/02/21 0915   • carvedilol (COREG) tablet 6.25 mg  6.25 mg Oral BID With Meals Tejinder Kent MD       • cholecalciferol (VITAMIN D3) tablet 2,000 Units  2,000 Units Oral Daily Tejinder Kent MD       • [COMPLETED] dextrose (D50W) 25 g/ 50mL Intravenous Solution 25 g  25 g Intravenous Once Maverick Edwards MD   25 g at 06/01/21 1638   • dextrose (D50W) 25 g/ 50mL Intravenous Solution 25 g  25 g Intravenous Q15 Min PRN Tejinder Kent MD       • dextrose (GLUTOSE) oral gel 15 g  15 g Oral Q15 Min PRN Tejinder Kent MD       • dorzolamide (TRUSOPT) 2 % ophthalmic solution 1 drop  1 drop Left Eye BID Tejinder Kent MD   1 drop at 06/02/21 0914   • enoxaparin (LOVENOX) syringe 30 mg  30 mg Subcutaneous Q24H Tejinder Kent MD   30 mg at  06/02/21 0920   • [START ON 6/3/2021] famotidine (PEPCID) tablet 20 mg  20 mg Oral Daily Bni Guy, DO       • Ferric Citrate tablet 840 mg  4 tablet Oral TID With Meals Tejinder Kent MD       • fluticasone (FLONASE) 50 MCG/ACT nasal spray 2 spray  2 spray Nasal BID Tejinder Kent MD   2 spray at 06/02/21 0914   • [COMPLETED] furosemide (LASIX) injection 80 mg  80 mg Intravenous Once Benitez Farrell MD   80 mg at 06/01/21 2107   • glucagon (human recombinant) (GLUCAGEN DIAGNOSTIC) injection 1 mg  1 mg Subcutaneous Q15 Min PRN Tejinder Kent MD       • [COMPLETED] haloperidol lactate (HALDOL) injection 2 mg  2 mg Intravenous Once Benitez Farrell MD   2 mg at 06/01/21 1838   • insulin lispro (humaLOG) injection 2-7 Units  2-7 Units Subcutaneous TID AC Tejinder Kent MD       • ipratropium-albuterol (DUO-NEB) nebulizer solution 3 mL  3 mL Nebulization Q4H PRN Tejinder Kent MD       • lactobacillus acidophilus (RISAQUAD) capsule 1 capsule  1 capsule Oral Daily Tejinder Kent MD       • losartan (COZAAR) tablet 50 mg  50 mg Oral BID Tejinder Kent MD       • metoclopramide (REGLAN) tablet 5 mg  5 mg Oral 4x Daily AC & at Bedtime Tejinder Kent MD       • midodrine (PROAMATINE) tablet 5 mg  5 mg Oral TID AC Tejinder Kent MD       • [COMPLETED] naloxone (NARCAN) injection 0.4 mg  0.4 mg Intravenous Once Maverick Edwards MD   0.4 mg at 06/01/21 1643   • [COMPLETED] naloxone (NARCAN) injection 0.4 mg  0.4 mg Intravenous Once Maverick Edwards MD   0.4 mg at 06/01/21 1646   • [COMPLETED] OLANZapine (zyPREXA) injection 10 mg  10 mg Intramuscular Once Benitez Farrell MD   10 mg at 06/01/21 1812   • ondansetron (ZOFRAN) tablet 4 mg  4 mg Oral Q6H PRN Tejinder Kent MD       • Pharmacy to Dose Zosyn   Does not apply Continuous PRN Tejinder Kent MD       • piperacillin-tazobactam (ZOSYN) 3.375 g in iso-osmotic dextrose 50 ml (premix)  3.375 g Intravenous Q12H  Tejinder Kent MD   3.375 g at 06/02/21 0906   • [COMPLETED] piperacillin-tazobactam (ZOSYN) 3.375 g/100 mL 0.9% NS IVPB (mbp)  3.375 g Intravenous Once Benitez Farrell MD   3.375 g at 06/01/21 2106   • pravastatin (PRAVACHOL) tablet 10 mg  10 mg Oral Nightly Tejinder Kent MD       • sertraline (ZOLOFT) tablet 25 mg  25 mg Oral Daily Tejinder Kent MD       • sodium chloride 0.9 % flush 10 mL  10 mL Intravenous Q12H Tejinder Kent MD   10 mL at 06/02/21 0922   • sodium chloride 0.9 % flush 10 mL  10 mL Intravenous PRN Tejinder Kent MD       • timolol (TIMOPTIC) 0.5 % ophthalmic solution 1 drop  1 drop Left Eye Daily Tejinder Kent MD   1 drop at 06/02/21 0913   • [COMPLETED] vancomycin 1250 mg/250 mL 0.9% NS IVPB ([facility])  20 mg/kg Intravenous Once Benitez Farrell MD   1,250 mg at 06/01/21 2111   • [START ON 6/3/2021] vancomycin 750 mg/250 mL 0.9% NS IVPB ([facility])  750 mg Intravenous Once per day on Tue Thu Sat Tejinder Kent MD            Consult Notes (last 48 hours) (Notes from 05/31/21 1332 through 06/02/21 1332)      Gomez Lemos, APRAC at 06/02/21 0831      Consult Orders    1. Inpatient Nephrology Consult [[id number]] ordered by Tejinder Kent MD at 06/02/21 0304          Attestation signed by Jose Caballero MD at 06/02/21 1302    I have personally examined the patient and reviewed all the data.  I also agree with history and physical examination as well as plan generated by my nurse practitioner.    Chief complaint: Decreasing mental status    61 years old noncompliant dialysis patient.  She has end-stage renal disease secondary to diabetic nephropathy.  She goes to [facility] on Tuesday Thursday Saturday routine dialysis.  She misses treatments frequently.  However she has received treatment yesterday as per schedule.  Presenting with decreasing mental status and was found to have severe hypoglycemia.  She is currently being treated with intravenous dextrose.   She is currently admitted to ICU for close monitoring.    Assessment:  1.  End-stage renal disease on maintenance dialysis.  2.  Type II diabetic nephropathy.  3.  Metabolic encephalopathy.  4.  Anemia of chronic kidney disease.  5.  Chronic obstructive pulmonary disease.  6.  Possible sepsis    Plan:  1.  Hemodialysis treatment tomorrow.  2.  Close monitoring of blood sugar.  3.  Close monitoring of neuro status.                    Nephrology ([facility]) Consult Note      Patient:  Mini Gentile  YOB: 1960  Date of Service: 6/2/2021  MRN: [id number]   Acct: [id number]   Primary Care Physician: Bharati Dahl APRN  Advance Directive:   Code Status and Medical Interventions:   Ordered at: 06/02/21 0310     Code Status:    CPR     Medical Interventions (Level of Support Prior to Arrest):    Full     Admit Date: 6/1/2021       Hospital Day: 1  Referring Provider: Tejinder Kent MD      Patient personally seen and examined.  Complete chart including Consults, Notes, Operative Reports, Labs, Cardiology, and Radiology studies reviewed as able.        Subjective:  Mini Gentile is a 61 y.o. female  whom we were consulted for end stage renal disease. Maintenance hemodialysis on Tuesday Thursday Saturday at [facility]. Fairly noncompliant with dialysis treatments but did attend on 6/01.  Discharged two weeks ago from [facility] [place] operated by [facility] following fistulogram/venoplasty of left extremity on 5/13.  Presented this time with altered mental status. She was hypoglycemic, treated with V dextrose, and was also given Narcan which seemed to help improve her level of responsiveness. Currently is awake but confused. Unable to provide any meaningful history.      Allergies:  Bupropion, Chantix [varenicline], Gabapentin, Azithromycin, Levofloxacin, Penicillins, and Sulfa antibiotics    Home Meds:  Medications Prior to Admission   Medication Sig Dispense Refill Last Dose   • Ferric  Citrate 1  MG(Fe) tablet Take  by mouth.      • metoclopramide (REGLAN) 5 MG tablet Take 5 mg by mouth 4 (Four) Times a Day Before Meals & at Bedtime.      • midodrine (PROAMATINE) 5 MG tablet Take 5 mg by mouth 3 (Three) Times a Day Before Meals.      • traMADol (ULTRAM) 50 MG tablet Take 50 mg by mouth Every 6 (Six) Hours As Needed for Moderate Pain .      • Travoprost (TRAVATAN OP) Apply  to eye(s) as directed by provider.      • albuterol sulfate  (90 Base) MCG/ACT inhaler Inhale 2 puffs Every 4 (Four) Hours As Needed for Wheezing. 18 g 0    • aspirin 81 MG EC tablet Take 81 mg by mouth Daily.      • atropine 1 % ophthalmic solution Administer 1 drop into the left eye Daily.      • brimonidine (ALPHAGAN) 0.2 % ophthalmic solution Administer 1 drop into the left eye 3 (Three) Times a Day.      • carvedilol (COREG) 6.25 MG tablet Take 6.25 mg by mouth 2 (Two) Times a Day With Meals.      • cholecalciferol (VITAMIN D3) 1000 units tablet Take 2,000 Units by mouth Daily.      • dorzolamide (TRUSOPT) 2 % ophthalmic solution Administer 1 drop into the left eye 2 (Two) Times a Day.      • famotidine (PEPCID) 20 MG tablet Take 20 mg by mouth 2 (Two) Times a Day.      • fluticasone (FLONASE) 50 MCG/ACT nasal spray 2 sprays into the nostril(s) as directed by provider 2 (Two) Times a Day.      • insulin glargine (LANTUS) 100 UNIT/ML injection Inject 10 Units under the skin Every Night.      • ipratropium-albuterol (DUO-NEB) 0.5-2.5 mg/3 ml nebulizer Take 3 mL by nebulization Every 4 (Four) Hours As Needed for Wheezing or Shortness of Air.      • lactobacillus acidophilus (RISAQUAD) capsule capsule Take 1 capsule by mouth Daily. 30 capsule 2    • levothyroxine (SYNTHROID, LEVOTHROID) 25 MCG tablet Take 1 tablet by mouth Daily. 30 tablet 0    • losartan (COZAAR) 50 MG tablet Take 50 mg by mouth 2 (Two) Times a Day.      • ondansetron (ZOFRAN) 4 MG tablet Take 1 tablet by mouth Every 6 (Six) Hours As Needed for  Nausea or Vomiting. 24 tablet 2    • pantoprazole (PROTONIX) 40 MG EC tablet Take 1 tablet by mouth Daily. 30 tablet 1    • pravastatin (PRAVACHOL) 10 MG tablet Take 10 mg by mouth Every Night.      • pregabalin (LYRICA) 50 MG capsule Take 50 mg by mouth 2 (Two) Times a Day.      • sertraline (ZOLOFT) 25 MG tablet Take 25 mg by mouth Daily.      • sevelamer (RENVELA) 800 MG tablet Take 2,400 mg by mouth 3 (Three) Times a Day With Meals.      • timolol (TIMOPTIC) 0.5 % ophthalmic solution Administer 1 drop into the left eye Every Morning.          Medicines:  Current Facility-Administered Medications   Medication Dose Route Frequency Provider Last Rate Last Admin   • acetaminophen (TYLENOL) tablet 650 mg  650 mg Oral Q4H PRN Tejinder Kent MD        Or   • acetaminophen (TYLENOL) suppository 650 mg  650 mg Rectal Q4H PRN Tejinder Kent MD       • aspirin EC tablet 81 mg  81 mg Oral Daily Tejinder Kent MD       • atropine 1 % ophthalmic solution 1 drop  1 drop Left Eye Daily Tejinder Kent MD       • brimonidine (ALPHAGAN) 0.2 % ophthalmic solution 1 drop  1 drop Left Eye TID Tejinder Kent MD       • carvedilol (COREG) tablet 6.25 mg  6.25 mg Oral BID With Meals Tejinder Kent MD       • cholecalciferol (VITAMIN D3) tablet 2,000 Units  2,000 Units Oral Daily Tejinder Kent MD       • dextrose (D50W) 25 g/ 50mL Intravenous Solution 25 g  25 g Intravenous Q15 Min PRN Tejinder Kent MD       • dextrose (GLUTOSE) oral gel 15 g  15 g Oral Q15 Min PRN Tejinder Kent MD       • dorzolamide (TRUSOPT) 2 % ophthalmic solution 1 drop  1 drop Left Eye BID Tejinder Kent MD       • enoxaparin (LOVENOX) syringe 30 mg  30 mg Subcutaneous Q24H Tejinder Kent MD       • famotidine (PEPCID) tablet 20 mg  20 mg Oral BID Tejinder Kent MD       • Ferric Citrate tablet 840 mg  4 tablet Oral TID With Meals Tejinder Kent MD       • fluticasone (FLONASE) 50 MCG/ACT nasal spray 2 spray  2  spray Nasal BID Tejinder Kent MD       • glucagon (human recombinant) (GLUCAGEN DIAGNOSTIC) injection 1 mg  1 mg Subcutaneous Q15 Min PRN Tejinder Kent MD       • insulin lispro (humaLOG) injection 2-7 Units  2-7 Units Subcutaneous TID AC Tejinder Kent MD       • ipratropium-albuterol (DUO-NEB) nebulizer solution 3 mL  3 mL Nebulization Q4H PRN Tejinder Kent MD       • lactobacillus acidophilus (RISAQUAD) capsule 1 capsule  1 capsule Oral Daily Tejinder Kent MD       • losartan (COZAAR) tablet 50 mg  50 mg Oral BID Tejinder Kent MD       • metoclopramide (REGLAN) tablet 5 mg  5 mg Oral 4x Daily AC & at Bedtime Tejinder Kent MD       • midodrine (PROAMATINE) tablet 5 mg  5 mg Oral TID AC Tejinder Kent MD       • ondansetron (ZOFRAN) tablet 4 mg  4 mg Oral Q6H PRN Tejinder Kent MD       • Pharmacy to Dose Zosyn   Does not apply Continuous PRN Tejinder Kent MD       • piperacillin-tazobactam (ZOSYN) 3.375 g in iso-osmotic dextrose 50 ml (premix)  3.375 g Intravenous Q12H Tejinder Kent MD       • pravastatin (PRAVACHOL) tablet 10 mg  10 mg Oral Nightly Tejinder Kent MD       • sertraline (ZOLOFT) tablet 25 mg  25 mg Oral Daily Tejinder Kent MD       • sodium chloride 0.9 % flush 10 mL  10 mL Intravenous Q12H Tejinder Kent MD       • sodium chloride 0.9 % flush 10 mL  10 mL Intravenous PRN Tejinder Kent MD       • timolol (TIMOPTIC) 0.5 % ophthalmic solution 1 drop  1 drop Left Eye Daily Tejinder Kent MD       • [START ON 6/3/2021] vancomycin 750 mg/250 mL 0.9% NS IVPB ([facility])  750 mg Intravenous Q48H Tejinder Kent MD           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    • Chronic otitis media    • Diabetes (CMS/HCC)    • End stage renal disease on dialysis (CMS/HCC)    • Eustachian tube dysfunction    • GERD (gastroesophageal reflux disease)    • Glaucoma    • HTN  "(hypertension)    • Hyperlipidemia    • Mucoid otitis media    • Noncompliance of patient with renal dialysis (CMS/Formerly [facility])    • Tobacco abuse        Past Surgical History:  Past Surgical History:   Procedure Laterality Date   • ARTERIOVENOUS FISTULA     • CATARACT EXTRACTION WITH INTRAOCULAR LENS IMPLANT     •  SECTION     • CHOLECYSTECTOMY     • MYRINGOTOMY W/ TUBES Bilateral    • MYRINGOTOMY W/ TUBES Right    • TUBAL ABDOMINAL LIGATION         Family History  Family History   Problem Relation Age of Onset   • Heart disease Mother    • Diabetes Father    • Colon cancer Neg Hx    • Colon polyps Neg Hx        Social History  Social History     Socioeconomic History   • Marital status: Single     Spouse name: Not on file   • Number of children: Not on file   • Years of education: Not on file   • Highest education level: Not on file   Tobacco Use   • Smoking status: Current Every Day Smoker     Packs/day: 2.00     Years: 6.00     Pack years: 12.00     Types: Cigarettes   • Smokeless tobacco: Never Used   Vaping Use   • Vaping Use: Never used   Substance and Sexual Activity   • Alcohol use: No   • Drug use: No   • Sexual activity: Defer         Review of Systems:   unable to obtain due to altered mental status    Objective:  Patient Vitals for the past 24 hrs:   BP Temp Temp src Pulse Resp SpO2 Height Weight   21 0709 139/87 -- -- -- -- -- -- --   21 0700 -- -- -- 80 13 96 % -- --   21 0605 (!) 142/108 -- -- 85 19 100 % -- 65.1 kg (143 lb 9.6 oz)   21 0515 (!) 152/107 -- -- 86 20 100 % -- --   21 0400 108/50 96.9 °F (36.1 °C) Axillary 68 13 100 % -- --   21 0300 96/51 -- -- 68 15 98 % -- --   21 0200 109/49 -- -- 72 19 97 % -- --   21 0100 100/47 -- -- 72 17 98 % -- --   21 0000 104/46 96.4 °F (35.8 °C) Axillary 74 17 97 % -- --   21 2300 145/84 -- -- 80 16 96 % -- --   06/01/21 2230 163/91 95 °F (35 °C) Rectal -- 20 94 % 139.7 cm (55\") 65.1 kg (143 lb 9.6 " "oz)   06/01/21 2115 159/96 -- -- 89 -- 94 % -- --   06/01/21 2100 (!) 175/154 -- -- 90 -- 93 % -- --   06/01/21 2045 (!) 180/155 -- -- 89 -- 93 % -- --   06/01/21 2040 -- 95.2 °F (35.1 °C) Rectal -- -- -- -- --   06/01/21 2030 (!) 164/135 -- -- 91 -- 94 % -- --   06/01/21 2015 (!) 143/102 -- -- 91 -- 94 % -- --   06/01/21 2000 (!) 165/124 -- -- 92 -- -- -- --   06/01/21 1945 161/80 -- -- 94 -- -- -- --   06/01/21 1930 147/87 -- -- 94 -- 96 % -- --   06/01/21 1915 -- -- -- 94 -- 93 % -- --   06/01/21 1830 (!) 191/84 -- -- 93 -- 96 % -- --   06/01/21 1815 (!) 164/103 -- -- 87 -- 95 % -- --   06/01/21 1800 -- -- -- -- -- 93 % -- --   06/01/21 1745 -- -- -- -- -- -- 147.3 cm (58\") --   06/01/21 1745 -- -- -- 88 -- 93 % -- --   06/01/21 1730 148/94 -- -- 87 -- 93 % -- --   06/01/21 1715 (!) 81/50 -- -- 85 -- 93 % -- --   06/01/21 1700 167/77 -- -- 82 -- 94 % -- --   06/01/21 1645 172/63 -- -- 78 -- 91 % -- --   06/01/21 1636 -- 97.5 °F (36.4 °C) Axillary -- -- -- -- --   06/01/21 1635 (!) 87/70 -- -- -- -- -- -- --   06/01/21 1633 -- -- Oral 78 10 96 % -- --   06/01/21 1629 -- -- -- -- -- -- 152.4 cm (60\") 64.2 kg (141 lb 8.6 oz)       Intake/Output Summary (Last 24 hours) at 6/2/2021 0831  Last data filed at 6/2/2021 0400  Gross per 24 hour   Intake 668 ml   Output 50 ml   Net 618 ml     General: oriented X 1   Neck: supple, no JVD  Chest:  clear to auscultation bilaterally without respiratory distress  CVS: regular rate and rhythm  Abdominal: soft, nontender, positive bowel sounds  Extremities: no cyanosis or edema  Skin: warm and dry without rash  Neuro: no focal motor deficits    Labs:  Results from last 7 days   Lab Units 06/02/21  0318 06/01/21  1759   WBC 10*3/mm3 2.93* 3.43   HEMOGLOBIN g/dL 9.1* 10.1*   HEMATOCRIT % 28.3* 31.8*   PLATELETS 10*3/mm3 175 273         Results from last 7 days   Lab Units 06/02/21  0318 06/01/21  1910 06/01/21  1759   SODIUM mmol/L 131* 131* 133*   POTASSIUM mmol/L 3.6 3.6 3.7 "   CHLORIDE mmol/L 92* 89* 91*   CO2 mmol/L 29.0 26.0 28.0   BUN mg/dL 25* 24* 23   CREATININE mg/dL 3.90* 3.42* 3.30*   CALCIUM mg/dL 8.3* 9.1 9.1   BILIRUBIN mg/dL 0.3 0.4 0.4   ALK PHOS U/L 206* 260* 263*   ALT (SGPT) U/L 14 17 17   AST (SGOT) U/L 17 21 20   GLUCOSE mg/dL 200* 144* 126*       Radiology:   Imaging Results (Last 72 Hours)     Procedure Component Value Units Date/Time    CT Abdomen Pelvis Without Contrast [[id number]] Collected: 06/01/21 1952     Updated: 06/01/21 1957    Narrative:      CT ABDOMEN PELVIS WO CONTRAST- 6/1/2021 6:51 PM CDT     HISTORY: AMS; R41.82-Altered mental status, unspecified;  E16.2-Hypoglycemia, unspecified      COMPARISON: Chest CT dated 6/1/2021      DOSE LENGTH PRODUCT: 709 mGy cm. Automated exposure control was also  utilized to decrease patient radiation dose.     TECHNIQUE: Noncontrast enhanced images of the abdomen and pelvis  obtained without oral contrast. Multiplanar reformatted images were  provided for review.      FINDINGS:      LIVER: No focal liver lesion within limits of a noncontrast study.      BILIARY SYSTEM: The gallbladder is surgically absent. No intrahepatic or  extrahepatic ductal dilatation.      PANCREAS: No focal pancreatic lesion within limits of a noncontrast  study.      SPLEEN: Unremarkable.      KIDNEYS: Bilateral renal atrophy. No hydronephrosis. The ureters are  decompressed and normal in appearance.     ADRENALS: Unremarkable.     RETROPERITONEUM: No mass, lymphadenopathy or hemorrhage.      GI TRACT: The stomach and small bowel are unremarkable. Moderate colonic  stool. No inflammatory changes along the bowel.      OTHER: Bilateral upper thigh and diffuse body wall subcutaneous edema  reflecting systemic volume overload. Trace ascites which I suspect is  also related to the systemic volume overload. No acute bony abnormality.     PELVIS: No mass lesion, fluid collection or significant lymphadenopathy  is seen in the pelvis. The urinary  bladder is normal in appearance.       Impression:      1. Anasarca. Otherwise, no acute process involving the abdomen or  pelvis.  This report was finalized on 06/01/2021 19:54 by Dr Jhonatan Berry, .    CT Chest Without Contrast Diagnostic [[id number]] Collected: 06/01/21 1948     Updated: 06/01/21 1955    Narrative:      CT CHEST WO CONTRAST DIAGNOSTIC- 6/1/2021 6:51 PM CDT     HISTORY: AMS; R41.82-Altered mental status, unspecified;  E16.2-Hypoglycemia, unspecified     COMPARISON: CT scan dated 1/19/2020     DOSE LENGTH PRODUCT: 257 mGy cm. Automated exposure control was also  utilized to decrease patient radiation dose.     TECHNIQUE: Serial helical tomographic images of the chest were acquired  without contrast. Multiplanar reformatted images were provided for  review.     FINDINGS:     Visualized neck base: The imaged portion of the base of the neck appears  unremarkable.      Lungs: Right middle and right lower lobe airways are completely  opacified with debris and there is atelectasis of both of these lobes.  Interlobular septal thickening and peribronchial thickening with  bilateral groundglass opacities, reflecting pulmonary edema. There is a  moderate to large pleural effusion on the right. 7 mm left upper lobe  pulmonary nodule (series 3-image 47).     Heart: Prominent four-chamber cardiomegaly. There is no pericardial  effusion. Advanced coronary artery atheromatous calcification.     Vasculature: There is calcified atheromatous plaque in the aorta without  aneurysmal dilation. The pulmonary arteries are enlarged, suggestive of  pulmonary arterial hypertension.     Mediastinum and lymph nodes: No suspicious hilar or mediastinal  adenopathy..     Skeletal and soft tissues: Diffuse chest wall edema. Comminuted  fracturing of the right proximal humerus.       Impression:      1. Volume overload with interstitial and casey pulmonary edema. There is  also a moderate to large right pleural effusion.  2. There  is debris diffusely opacifying the right middle and right lower  lobe airways with atelectasis of both of these lobes, suspected mucous  plugging or aspirated contents.  3. Comminuted fracturing of the right proximal humerus.  This report was finalized on 06/01/2021 19:52 by Dr Jhonatan Berry, .    CT Head Without Contrast [[id number]] Collected: 06/01/21 1930     Updated: 06/01/21 1934    Narrative:      CT HEAD WO CONTRAST- 6/1/2021 6:51 PM CDT     HISTORY: Mental status change, unknown cause; R41.82-Altered mental  status, unspecified; E16.2-Hypoglycemia, unspecified       DOSE LENGTH PRODUCT: 668 mGy cm. Automated exposure control was also  utilized to decrease patient radiation dose.     Technique:   Axial CT of the brain without IV contrast. Sagittal and coronal  reformations are also provided for review. Soft tissue and bone kernels  are available for interpretation.     Comparison: CT scan dated 4/3/2021.     Findings:      There is no evidence of acute large vascular territory infarct. No  intra-axial or extra-axial hemorrhage. No visualized mass lesion or mass  effect. The ventricles, cortical sulci and basal cisterns are symmetric  and age appropriate.  Posterior fossa structures are unremarkable. The  scalp and calvarium are intact. Visualized paranasal sinuses and  mastoids are clear.        Impression:      Impression:    1. No acute intracranial process. Stable exam.  This report was finalized on 06/01/2021 19:31 by Dr Jhonatan Berry, .    XR Chest 1 View [[id number]] Collected: 06/01/21 1834     Updated: 06/01/21 1837    Narrative:      Frontal upright radiograph of the chest 6/1/2021 6:10 PM CDT     HISTORY: Central line     COMPARISON: 6/1/2021.       Impression:      FINDINGS/IMPRESSION:      Right IJ central line is in good position. Otherwise stable. No  pneumothorax.  This report was finalized on 06/01/2021 18:34 by Dr Jhonatan Berry, .    XR Chest 1 View [[id number]] Collected: 06/01/21 1715      Updated: 06/01/21 1720    Narrative:      Frontal upright radiograph of the chest 6/1/2021 5:12 PM CDT     HISTORY: Altered mental status     COMPARISON: Chest exam dated 5/15/2021.     FINDINGS:      Stable moderate to large layering right pleural effusion with continued  opacification of the right lung base. Left lung is clear. Heart size is  stable. The pulmonary vasculature are nondilated. No pneumothorax.  Partially imaged impacted fracture across the surgical neck of the right  proximal humerus.       Impression:      1. No significant interval change from the recent comparison exam. There  is a moderate to large layering right pleural effusion with  opacification of the right lung base.        This report was finalized on 06/01/2021 17:17 by Dr Jhonatan Berry, .          Culture:  No results found for: BLOODCX, URINECX, WOUNDCX, MRSACX, RESPCX, STOOLCX      Assessment   1.  End stage renal disease on HD TTS  2.  Metabolic encephalopathy  3.  Sepsis  4.  Type 2 diabetes with hypoglycemia  5.  Hypertension  6.  Anemia of CKD  7.  COPD    Plan:  1.  Stop IV fluids  2.  Dialysis next due on 6/03  3.  Monitor labs  4.  Further assessment and plan pending Dr Caballero's evaluation of patient      Thank you for the consult, we appreciate the opportunity to provide care to your patients.  Feel free to contact me if I can be of any further assistance.      Gomez Lemos, ANTHONY  6/2/2021  08:31 CDT    Electronically signed by Jose Caballero MD at 06/02/21 8559     Vimal Jewell, PharmD at 06/02/21 5470          Pharmacy Dosing Service  Antimicrobial Dosing  Zosyn & Vancomycin    Assessment/Action/Plan:  Pharmacy to dose Zosyn and Vancomycin requests on dialysis patient for Sepsis. Initiated the following tentative dosing regimens until nephrology determines dialysis to be utilized while hospitalized.   Initiated renally adjusted extended-infusion dosing of, Zosyn 3.375 gm IV over 4 hours every 12 hours, following  "initial dose given in ER, for patient with CrCl < 20 ml/min. Current end date/final dose: 6/8/21 at 2100.  Initiated Vancomycin 750 mg IV every 48 hours, following a 1,250 mg dose given in ER. No levels ordered at this time. Current end date/final dose: 6/9/21 at 2100.  Pharmacy will continue to monitor daily and make further adjustment(s) accordingly.     Subjective:  Mini Gentile is a 61 y.o. female with a  \"Pharmacy to Dose Zosyn and Vancomycin\" consult for the treatment of Sepsis , day 2 of treatment.    Objective:  Ht: 139.7 cm (55\"); Wt: 65.1 kg (143 lb 9.6 oz)  Estimated Creatinine Clearance: 13.7 mL/min (A) (by C-G formula based on SCr of 3.42 mg/dL (H)).   Creatinine   Date Value Ref Range Status   06/01/2021 3.42 (H) 0.57 - 1.00 mg/dL Final   06/01/2021 3.30 (H) 0.57 - 1.00 mg/dL Final      Lab Results   Component Value Date    WBC 3.43 06/01/2021    WBC  06/01/2021      Comment:      Corrected results  Corrected result. Previous result was 2.70 10*3/mm3 on 6/1/2021 at 1735 CDT.      Baseline culture results:  Microbiology Results (last 10 days)       Procedure Component Value - Date/Time    COVID PRE-OP / PRE-PROCEDURE SCREENING ORDER (NO ISOLATION) - Swab, Nasal Cavity [[id number]]  (Normal) Collected: 05/24/21 1427    Lab Status: Final result Specimen: Swab from Nasal Cavity Updated: 05/24/21 2038    Narrative:      The following orders were created for panel order COVID PRE-OP / PRE-PROCEDURE SCREENING ORDER (NO ISOLATION) - Swab, Nasal Cavity.  Procedure                               Abnormality         Status                     ---------                               -----------         ------                     COVID-19,APTIMA PANTHER,...[[id number]]  Normal              Final result                 Please view results for these tests on the individual orders.    COVID-19,APTIMA PANTHER,PAD IN-HOUSE,NP/OP/NASAL SWAB IN UTM/VTM/SALINE/LIQUID AMIES TRANSPORT MEDIA/NP WASH OR ASPIRATE, 24 HR TAT " - Swab, Nasal Cavity [[id number]]  (Normal) Collected: 05/24/21 1427    Lab Status: Final result Specimen: Swab from Nasal Cavity Updated: 05/24/21 2038     COVID19 Not Detected    Narrative:      Fact sheet for providers: https://www.fda.gov/media/916416/download     Fact sheet for patients: https://www.fda.gov/media/658290/download    Test performed by RT PCR.            Vimal Jewell PharmD  06/02/21 03:41 CDT      Electronically signed by Vimal Jewell, PharmD at 06/02/21 0353        (Oxygen Therapy)  Flow (L/min)  SpO2             06/02/21 1130  97.7 (36.5)  75  --  133/83  --  --  99   06/02/21 1100  --  79  --  132/64  --  --  100   06/02/21 1045  --  82  --  117/99  --  --  99   06/02/21 1035  --  84  --  151/67  --  --  96   06/02/21 1000  --  84  --  143/86  --  --  99   06/02/21 0945  --  82  --  126/96  --  --  99   06/02/21 0930  --  82  --  145/120Abnormal   --  --  99   06/02/21 0915  --  83  --  98/67  --  --  100   06/02/21 0905  --  73  --  110/54  --  --  99   06/02/21 0900  --  66  --  86/43Abnormal   --  --  100   06/02/21 0845  --  67  --  92/46  --  --  99   06/02/21 0830  97.9 (36.6)  70  --  90/46  --  --  100   06/02/21 0815  --  74  --  129/61  --  --  100   06/02/21 0800  --  78  --  143/59  --  --  100   06/02/21 0745  --  77  --  119/58  --  --  100   06/02/21 0730  --  80  --  129/106Abnormal   --  --  100   06/02/21 0715  --  78  --  135/60  --  --  100   06/02/21 0709  --  --  --  139/87  --  --  --   06/02/21 0700  --  80  13  178/154Abnormal   --  --  96   06/02/21 0605  --  85  19  142/108Abnormal   --  --  100   06/02/21 0515  --  86  20  152/107Abnormal   --  --  100   06/02/21 0400  96.9 (36.1)  68  13  108/50  nasal cannula  2  100   06/02/21 0300  --  68  15  96/51  --  --  98   06/02/21 0200  --  72  19  109/49  --  --  97   06/02/21 0100  --  72  17  100/47  --  --  98   06/02/21 0000  96.4 (35.8)  74  17  104/46  nasal cannula  2  97   06/01/21 2300  --  80  16  145/84   --  --  96   06/01/21 2230  95 (35)  --  20  163/91  nasal cannula  2  94   06/01/21 2115  --  89  --  159/96  --  --  94   06/01/21 2100  --  90  --  175/154Abnormal   --  --  93   06/01/21 2045  --  89  --  180/155Abnormal   --  --  93   06/01/21 2040  95.2 (35.1)  --  --  --  --  --  --   06/01/21 2030  --  91  --  164/135Abnormal   --  --  94   06/01/21 2015  --  91  --  143/102Abnormal   --  --  94   06/01/21 2000  --  92  --  165/124Abnormal   --  --  --   06/01/21 1945  --  94  --  161/80  --  --  --   06/01/21 1930  --  94  --  147/87  --  --  96   06/01/21 1915  --  94  --  --  --  --  93   06/01/21 1830  --  93  --  191/84Abnormal   --  --  96   06/01/21 1815  --  87  --  164/103Abnormal   --  --  95   06/01/21 1800  --  --  --  --  --  --  93 06/01/21 1745  --  88  --  --  --  --  93   06/01/21 1730  --  87  --  148/94  --  --  93   06/01/21 1715  --  85  --  81/50Abnormal   --  --  93   06/01/21 1700  --  82  --  167/77  --  --  94   06/01/21 1645  --  78  --  172/63  --  --  91   06/01/21 1636  97.5 (36.4)  --  --  --  --  --  --   06/01/21 1635  --  --  --  87/70Abnormal   --  --  --   06/01/21 1633  --  78  10  --  room air  --  96

## 2021-06-03 NOTE — CASE MANAGEMENT/SOCIAL WORK
Continued Stay Note   Db     Patient Name: Mini Gentile  MRN: [id number]  Today's Date: 6/3/2021    Admit Date: 6/1/2021    Discharge Plan     Row Name 06/03/21 1104       Plan    Plan  SNF placement for rehab    Provided Post Acute Provider List?  Yes    Post Acute Provider List  Nursing Home    Delivered To  Patient    Method of Delivery  In person    Patient/Family in Agreement with Plan  yes    Plan Comments  SW spoke to patient regarding referrals for rehab placement.  Patient agreed to referrals to [facility] and [facility].  Conversation regarding placement occurred with therapy.  Will proceed with referrals and await response.        Discharge Codes    No documentation.             STACI Ramirez

## 2021-06-03 NOTE — THERAPY EVALUATION
Patient Name: Mini Gentile  : 1960    MRN: [id number]                              Today's Date: 6/3/2021       Admit Date: 2021    Visit Dx:     ICD-10-CM ICD-9-CM   1. Altered mental status, unspecified altered mental status type  R41.82 780.97   2. Hypoglycemia  E16.2 251.2   3. Aspiration pneumonia, unspecified aspiration pneumonia type, unspecified laterality, unspecified part of lung (CMS/[facility])  J69.0 507.0   4. Dysphagia, unspecified type  R13.10 787.20   5. 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   • Eustachian tube dysfunction   • Chronic otitis media   • Pneumonia of left upper lobe due to Streptococcus (CMS/[facility])   • End stage renal failure on dialysis (CMS/HCC)   • Diabetes (CMS/HCC)   • Glaucoma   • HTN (hypertension)   • Proteinuria   • Type 2 diabetes mellitus, with long-term current use of insulin (CMS/[facility])   • Chronic anemia   • Acute pulmonary edema (CMS/HCC)   • Non-compliance with renal dialysis (CMS/[facility])   • Elevated troponin due to ESRD    • Hyperkalemia   • Encephalopathy, metabolic, due to uremia   • High anion gap metabolic acidosis due to uremia   • Respiratory distress   • Acute diastolic heart failure (CMS/[facility])   • Lymph node enlargement, left axillary measuring 1.1 cm -follow-up for primary care   • Nausea vomiting and diarrhea   • Acute diastolic heart failure (CMS/[facility])   • Diabetes mellitus with ophthalmic complication (CMS/HCC)   • Tobacco abuse   • Urinary tract infection with hematuria   • Pneumonia due to infectious organism   • Abnormal urine findings   • Closed fracture of right proximal humerus   • Hyponatremia   • Hypothyroidism (acquired)   • Forehead laceration secondary to fall   • Heme positive stool   • Dialysis AV fistula malfunction, initial encounter (CMS/[facility])   • Aspiration into airway   • Bronchospasm, acute   • Altered mental status   •  Protein-calorie malnutrition (CMS/[facility])   • Pupil irregular   • Sepsis (CMS/[facility])   • Hypoglycemia     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    • Chronic otitis media    • Diabetes (CMS/[facility])    • End stage renal disease on dialysis (CMS/[facility])    • Eustachian tube dysfunction    • GERD (gastroesophageal reflux disease)    • Glaucoma    • HTN (hypertension)    • Hyperlipidemia    • Mucoid otitis media    • Noncompliance of patient with renal dialysis (CMS/[facility])    • Tobacco abuse      Past Surgical History:   Procedure Laterality Date   • ARTERIOVENOUS FISTULA     • CATARACT EXTRACTION WITH INTRAOCULAR LENS IMPLANT     •  SECTION     • CHOLECYSTECTOMY     • MYRINGOTOMY W/ TUBES Bilateral    • MYRINGOTOMY W/ TUBES Right    • TUBAL ABDOMINAL LIGATION       General Information     Row Name 21          OT Time and Intention    Document Type  evaluation Pt presented to ED with confusion and hypoglycemia after diaylsis. Dx: AMS, sepsis, hypoglycemia. Pt with humeral fx from 3/27, was NWB in sling at that time. Will continue with NWB R UE in sling until otherwise cleared.  -J     Mode of Treatment  occupational therapy  -     Row Name 21          General Information    Patient Profile Reviewed  yes  -JJ     Prior Level of Function  independent:;all household mobility;transfer;ADL's pt reports occasionally needing assist with LBD. Dependent for cooking/cleaning.  -JJ     Existing Precautions/Restrictions  fall;non-weight bearing;right;shoulder R UE NWB in sling  -J     Barriers to Rehab  previous functional deficit  -J     Row Name 21          Living Environment    Lives With  significant other  -     Row Name 21          Cognition    Orientation Status (Cognition)  oriented to;person;place;disoriented to;situation;time  -     Row Name 21          Safety Issues, Functional Mobility    Impairments Affecting  Function (Mobility)  balance;cognition;endurance/activity tolerance;strength;pain  -     Cognitive Impairments, Mobility Safety/Performance  awareness, need for assistance;insight into deficits/self-awareness;safety precaution awareness;safety precaution follow-through  -       User Key  (r) = Recorded By, (t) = Taken By, (c) = Cosigned By    Initials Name Provider Type     Kami Chapman OTR/L Occupational Therapist          Mobility/ADL's     Row Name 06/03/21 0929          Bed Mobility    Bed Mobility  supine-sit;sit-supine  -     Supine-Sit [place] (Bed Mobility)  moderate assist (50% patient effort);verbal cues  -     Sit-Supine [place] (Bed Mobility)  moderate assist (50% patient effort);verbal cues  -     Bed Mobility, Safety Issues  decreased use of arms for pushing/pulling  -     Assistive Device (Bed Mobility)  head of bed elevated  -     Comment (Bed Mobility)  required vcs for NWB R UE.  -     Row Name 06/03/21 0929          Transfers    Transfers  sit-stand transfer  -     Comment (Transfers)  required vcs to maintain  -     Sit-Stand [place] (Transfers)  minimum assist (75% patient effort);2 person assist;verbal cues  -     Row Name 06/03/21 0929          Sit-Stand Transfer    Assistive Device (Sit-Stand Transfers)  -- [id number]  -     Row Name 06/03/21 0929          Functional Mobility    Functional Mobility- Comment  unable to complete at this time d/t weakness and decreased cognitive status.  -     Row Name 06/03/21 0929          Activities of Daily Living    BADL Assessment/Intervention  lower body dressing  -     Row Name 06/03/21 0929          Mobility    Extremity Weight-bearing Status  (S) right upper extremity  -     Right Upper Extremity (Weight-bearing Status)  (S) non weight-bearing (NWB)  -     Row Name 06/03/21 0929          Lower Body Dressing Assessment/Training    [place] Level (Lower Body Dressing)  socks;maximum assist (25%  patient effort)  -     Position (Lower Body Dressing)  edge of bed sitting  -       User Key  (r) = Recorded By, (t) = Taken By, (c) = Cosigned By    Initials Name Provider Type    Kami Garduno OTR/L Occupational Therapist        Obj/Interventions     Row Name 06/03/21 0929          Sensory Assessment (Somatosensory)    Sensory Assessment (Somatosensory)  sensation intact  -     Row Name 06/03/21 0929          Vision Assessment/Intervention    Visual Impairment/Limitations  WFL  -     Row Name 06/03/21 0929          Range of Motion Comprehensive    Comment, General Range of Motion  L UE AROM WFL, R elbow and wrsit AROM WFL, L shoulder n/a d/t humeral fx.  -     Row Name 06/03/21 0929          Strength Comprehensive (MMT)    Comment, General Manual Muscle Testing (MMT) Assessment  L UE AROM WFL, R UE n/a d/t fx.  -     Row Name 06/03/21 0929          Balance    Balance Assessment  sitting static balance;standing static balance  -     Static Sitting Balance  WFL;supported;sitting, edge of bed  -     Static Standing Balance  mild impairment;standing;supported  -       User Key  (r) = Recorded By, (t) = Taken By, (c) = Cosigned By    Initials Name Provider Type    Kami Garduno OTR/L Occupational Therapist        Goals/Plan     Row Name 06/03/21 0929          Transfer Goal 1 (OT)    Activity/Assistive Device (Transfer Goal 1, OT)  shower chair;commode, bedside with drop arms;commode  -     [place] Level/Cues Needed (Transfer Goal 1, OT)  contact guard assist  -JJ     Time Frame (Transfer Goal 1, OT)  long term goal (LTG);by discharge  -     Progress/Outcome (Transfer Goal 1, OT)  goal ongoing  -     Row Name 06/03/21 0929          Dressing Goal 1 (OT)    Activity/Device (Dressing Goal 1, OT)  lower body dressing  -     [place]/Cues Needed (Dressing Goal 1, OT)  contact guard assist  -JJ     Time Frame (Dressing Goal 1, OT)  long term goal (LTG);by discharge  -      Progress/Outcome (Dressing Goal 1, OT)  goal ongoing  -JJ     Row Name 06/03/21 0929          Toileting Goal 1 (OT)    Activity/Device (Toileting Goal 1, OT)  toileting skills, all  -JJ     [place] Level/Cues Needed (Toileting Goal 1, OT)  minimum assist (75% or more patient effort)  -JJ     Time Frame (Toileting Goal 1, OT)  long term goal (LTG);by discharge  -JJ     Progress/Outcome (Toileting Goal 1, OT)  goal ongoing  -JJ     Row Name 06/03/21 0929          Therapy Assessment/Plan (OT)    Planned Therapy Interventions (OT)  activity tolerance training;adaptive equipment training;BADL retraining;functional balance retraining;occupation/activity based interventions;patient/caregiver education/training;orthotic fabrication/fitting/training;strengthening exercise;transfer/mobility retraining  -JJ       User Key  (r) = Recorded By, (t) = Taken By, (c) = Cosigned By    Initials Name Provider Type    Kami Garduno OTR/L Occupational Therapist        Clinical Impression     Row Name 06/03/21 0929          Pain Assessment    Additional Documentation  Pain Scale: Numbers Pre/Post-Treatment (Group)  -JJ     Row Name 06/03/21 0929          Pain Scale: Numbers Pre/Post-Treatment    Pain Location - Side  Right  -JJ     Pain Location - Orientation  upper  -JJ     Pain Location  extremity  -JJ     Pain Intervention(s)  Medication (See MAR);Repositioned;Ambulation/increased activity  -JJ     Row Name 06/03/21 0929          Plan of Care Review    Plan of Care Reviewed With  patient  -JJ     Outcome Summary  OT eval completed. Pt is alert and oriented to self and place, disoriented to time and situation. With proximal humeral fx from 3/27/21, per Ortho, NWB RUE in sling. Pt reports noncompliance with NWB status and use of sling at home. She was educated on importance of maintaining NWB and use of sling for healing fx. Pt continues to report pain in R shoulder from this fx. She required Min/Mod A for supine to sit at  EOB. Max A for donning socks d/t weakness and pain in R UE. Min Ax2 for sit <> stand t/f from EOB. Continued mobility not completed at this time d/t pt cognitive status and weakness. She required multiple verbal cues to maintain NWB R UE throughout activity. She would benefit from skilled OT services to address these deficits. Recommend d/c to SNF for continued skilled therapy services.  -     Row Name 06/03/21 0929          Therapy Assessment/Plan (OT)    Patient/Family Therapy Goal Statement (OT)  not stated at this time.  -     Rehab Potential (OT)  good, to achieve stated therapy goals  -     Criteria for Skilled Therapeutic Interventions Met (OT)  yes;skilled treatment is necessary  -     Therapy Frequency (OT)  5 times/wk  -     Predicted Duration of Therapy Intervention (OT)  10 days  -     Row Name 06/03/21 0929          Therapy Plan Review/Discharge Plan (OT)    Anticipated Discharge Disposition (OT)  skilled nursing facility  -     Row Name 06/03/21 0929          Vital Signs    Pre Patient Position  Supine  -     Intra Patient Position  Standing  -JJ     Post Patient Position  Supine  -J     Row Name 06/03/21 0929          Positioning and Restraints    Pre-Treatment Position  in bed  -JJ     Post Treatment Position  bed  -JJ     In Bed  notified nsg;fowlers;call light within reach;encouraged to call for assist;with family/caregiver  -       User Key  (r) = Recorded By, (t) = Taken By, (c) = Cosigned By    Initials Name Provider Type     Kami Chapman, ANDRYR/L Occupational Therapist        Outcome Measures     Row Name 06/03/21 0929          How much help from another is currently needed...    Putting on and taking off regular lower body clothing?  2  -JJ     Bathing (including washing, rinsing, and drying)  2  -JJ     Toileting (which includes using toilet bed pan or urinal)  2  -JJ     Putting on and taking off regular upper body clothing  2  -JJ     Taking care of personal  grooming (such as brushing teeth)  3  -     Eating meals  3  -     AM-PAC 6 Clicks Score (OT)  14  -     Row Name 06/03/21 0929          Functional Assessment    Outcome Measure Options  AM-PAC 6 Clicks Daily Activity (OT)  -       User Key  (r) = Recorded By, (t) = Taken By, (c) = Cosigned By    Initials Name Provider Type     Kami Chapman OTR/L Occupational Therapist        Occupational Therapy Education                 Title: PT OT SLP Therapies (In Progress)     Topic: Occupational Therapy (In Progress)     Point: ADL training (Done)     Description:   Instruct learner(s) on proper safety adaptation and remediation techniques during self care or transfers.   Instruct in proper use of assistive devices.              Learning Progress Summary           Patient Acceptance, E, VU by  at 6/3/2021 1248                   Point: Home exercise program (Not Started)     Description:   Instruct learner(s) on appropriate technique for monitoring, assisting and/or progressing therapeutic exercises/activities.              Learner Progress:  Not documented in this visit.          Point: Precautions (Done)     Description:   Instruct learner(s) on prescribed precautions during self-care and functional transfers.              Learning Progress Summary           Patient Acceptance, E, VU by  at 6/3/2021 1248                   Point: Body mechanics (Done)     Description:   Instruct learner(s) on proper positioning and spine alignment during self-care, functional mobility activities and/or exercises.              Learning Progress Summary           Patient Acceptance, E, VU by  at 6/3/2021 1248                               User Key     Initials Effective Dates Name Provider Type Discipline     12/04/20 -  Kami Chapman OTR/L Occupational Therapist OT              OT Recommendation and Plan  Planned Therapy Interventions (OT): activity tolerance training, adaptive equipment training, BADL  retraining, functional balance retraining, occupation/activity based interventions, patient/caregiver education/training, orthotic fabrication/fitting/training, strengthening exercise, transfer/mobility retraining  Therapy Frequency (OT): 5 times/wk  Plan of Care Review  Plan of Care Reviewed With: patient  Outcome Summary: OT eval completed. Pt is alert and oriented to self and place, disoriented to time and situation. With proximal humeral fx from 3/27/21, per Ortho, NWB RUE in sling. Pt reports noncompliance with NWB status and use of sling at home. She was educated on importance of maintaining NWB and use of sling for healing fx. Pt continues to report pain in R shoulder from this fx. She required Min/Mod A for supine to sit at EOB. Max A for donning socks d/t weakness and pain in R UE. Min Ax2 for sit <> stand t/f from EOB. Continued mobility not completed at this time d/t pt cognitive status and weakness. She required multiple verbal cues to maintain NWB R UE throughout activity. She would benefit from skilled OT services to address these deficits. Recommend d/c to SNF for continued skilled therapy services.     Time Calculation:   Time Calculation- OT     Row Name 06/03/21 1248             Time Calculation- OT    OT Start Time  0929 add 10 minutes for chart review and 5 minutes for attempted eval on 6/2 from [id number]  -      OT Stop Time  1011  -      OT Time Calculation (min)  42 min  -      OT Received On  06/03/21  -      OT Goal Re-Cert Due Date  06/13/21  -        User Key  (r) = Recorded By, (t) = Taken By, (c) = Cosigned By    Initials Name Provider Type    Kami Garduno OTR/L Occupational Therapist        Therapy Charges for Today     Code Description Service Date Service Provider Modifiers Qty    [id number] HC OT EVAL LOW COMPLEXITY 4 6/3/2021 Kami Chapman OTR/L GO 1               LAITH Thomas  6/3/2021

## 2021-06-03 NOTE — THERAPY EVALUATION
Acute Care - Speech Language Pathology   Swallow Initial Evaluation [facility]     Patient Name: Mini Gentile  : 1960  MRN: [id number]  Today's Date: 6/3/2021               Admit Date: 2021  Clinical bedside swallow evaluation completed. Patient was initially asleep but able to wake with verbal greeting. OT assisted patient to edge of bed for proper positioning for PO intake. RN reported poor oral care upon admission to hospital. She reports moldy substance on dentures. RN cleaned dentures thoroughly. During evaluation oral health was much improved. RN also reports that family may had been feeding patient when she was not alert enough to eat. There is also some mention of vomiting. Poor oral care, decreased alertness, and vomiting may all be contributing factors to concern with aspiration pneumonia. Patient does have a congested non-productive cough upon entering room.     Primary problem: Confusion after missed dialysis, possible aspiration pneumonia. 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     Oral motor assessment completed WFL. The patient completed a full range of consistencies except for mechanical soft. No overt s/s of aspiration were observed. Vocal quality remained clear and unchanged throughout. Functional rotary chew given regular solids.  SLP unable to rule out aspiration at bedside; however, no overt s/s were observed.     SLP recommends:   1) Regular diet   2) Thin liquids   3) Meds whole as tolerated   4) Oral care and standard swallow precautions     SLP spoke with JENNIFFER Suarez RE: holding PO if any decrease in alertness. SLP will follow up to ensure continued diet toleration. If any other concerns per MD with aspiration, patient may warrant an instrumental evaluation to fully assess  pharyngeal function.   Maia Gonsalez, MS CCC-SLP 6/3/2021 10:29 CDT    Visit Dx:     ICD-10-CM ICD-9-CM   1. Altered mental status, unspecified altered mental status type  R41.82 780.97   2. Hypoglycemia  E16.2 251.2   3. Aspiration pneumonia, unspecified aspiration pneumonia type, unspecified laterality, unspecified part of lung (CMS/[facility])  J69.0 507.0   4. 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   • Eustachian tube dysfunction   • Chronic otitis media   • Pneumonia of left upper lobe due to Streptococcus (CMS/[facility])   • End stage renal failure on dialysis (CMS/[facility])   • Diabetes (CMS/[facility])   • Glaucoma   • HTN (hypertension)   • Proteinuria   • Type 2 diabetes mellitus, with long-term current use of insulin (CMS/[facility])   • Chronic anemia   • Acute pulmonary edema (CMS/[facility])   • Non-compliance with renal dialysis (CMS/[facility])   • Elevated troponin due to ESRD    • Hyperkalemia   • Encephalopathy, metabolic, due to uremia   • High anion gap metabolic acidosis due to uremia   • Respiratory distress   • Acute diastolic heart failure (CMS/[facility])   • Lymph node enlargement, left axillary measuring 1.1 cm -follow-up for primary care   • Nausea vomiting and diarrhea   • Acute diastolic heart failure (CMS/[facility])   • Diabetes mellitus with ophthalmic complication (CMS/[facility])   • Tobacco abuse   • Urinary tract infection with hematuria   • Pneumonia due to infectious organism   • Abnormal urine findings   • Closed fracture of right proximal humerus   • Hyponatremia   • Hypothyroidism (acquired)   • Forehead laceration secondary to fall   • Heme positive stool   • Dialysis AV fistula malfunction, initial encounter (CMS/[facility])   • Aspiration into airway   • Bronchospasm, acute   • Altered mental status   • Protein-calorie malnutrition (CMS/[facility])   • Pupil irregular   • Sepsis (CMS/[facility])   • Hypoglycemia     Past Medical History:   Diagnosis Date    • 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    • Chronic otitis media    • Diabetes (CMS/H[facility])    • End stage renal disease on dialysis (CMS/[facility])    • Eustachian tube dysfunction    • GERD (gastroesophageal reflux disease)    • Glaucoma    • HTN (hypertension)    • Hyperlipidemia    • Mucoid otitis media    • Noncompliance of patient with renal dialysis (CMS/[facility])    • Tobacco abuse      Past Surgical History:   Procedure Laterality Date   • ARTERIOVENOUS FISTULA     • CATARACT EXTRACTION WITH INTRAOCULAR LENS IMPLANT     •  SECTION     • CHOLECYSTECTOMY     • MYRINGOTOMY W/ TUBES Bilateral    • MYRINGOTOMY W/ TUBES Right    • TUBAL ABDOMINAL LIGATION          SWALLOW EVALUATION (last 72 hours)      SLP Adult Swallow Evaluation     Row Name 21 0928                   Rehab Evaluation    Document Type  evaluation  -MM        Subjective Information  no complaints  -MM        Patient Observations  alert;cooperative;agree to therapy  -MM        Patient/Family/Caregiver Comments/Observations  No family present. OT Haritha present.   -MM        Care Plan Review  evaluation/treatment results reviewed  -MM        Care Plan Review, Other Participant(s)  other (see comments) JENNIFFER Suarez   -MM        Patient Effort  good  -MM        Symptoms Noted During/After Treatment  none  -MM           General Information    Patient Profile Reviewed  yes  -MM        Pertinent History Of Current Problem  Primary problem: Confusion after missed dialysis, possible aspiration pneumonia. 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   -MM        Current Method of Nutrition  NPO  -MM        Precautions/Limitations, Vision  WFL;for purposes of eval  -MM        Precautions/Limitations, Hearing  WFL;for purposes of eval  -MM        Prior  Level of Function-Communication  WFL  -MM        Prior Level of Function-Swallowing  no diet consistency restrictions  -MM        Plans/Goals Discussed with  patient;other (see comments);agreed upon RN Daniela   -MM        Barriers to Rehab  none identified  -MM        Patient's Goals for Discharge  patient did not state  -MM           Pain    Additional Documentation  Pain Scale: Numbers Pre/Post-Treatment (Group)  -MM           Pain Scale: Numbers Pre/Post-Treatment    Pretreatment Pain Rating  0/10 - no pain  -MM        Posttreatment Pain Rating  0/10 - no pain  -MM           Oral Motor Structure and Function    Dentition Assessment  upper dentures/partial in place;lower dentures/partial in place  -MM        Secretion Management  WNL/WFL  -MM        Mucosal Quality  moist, healthy  -MM        Volitional Swallow  WFL  -MM        Volitional Cough  WFL  -MM           Oral Musculature and Cranial Nerve Assessment    Oral Motor General Assessment  WFL  -MM           General Eating/Swallowing Observations    Respiratory Support Currently in Use  nasal cannula  -MM        Eating/Swallowing Skills  fed by SLP  -MM        Positioning During Eating  upright in bed Edge of bed  -MM        Utensils Used  spoon;straw  -MM        Consistencies Trialed  regular textures;honey-thick liquids;nectar/syrup-thick liquids;thin liquids;pureed  -MM           Clinical Swallow Eval    Oral Prep Phase  WFL  -MM        Oral Transit  WFL  -MM        Oral Residue  WFL  -MM        Pharyngeal Phase  no overt signs/symptoms of pharyngeal impairment  -MM        Esophageal Phase  unremarkable  -MM        Clinical Swallow Evaluation Summary  See note.  -MM           Clinical Impression    Barriers to Overall Progress (SLP)  None  -MM        SLP Swallowing Diagnosis  R/O pharyngeal dysphagia  -MM        Functional Impact  risk of aspiration/pneumonia  -MM        Rehab Potential/Prognosis, Swallowing  good, to achieve stated therapy goals  -MM         Swallow Criteria for Skilled Therapeutic Interventions Met  demonstrates skilled criteria  -MM           Recommendations    Therapy Frequency (Swallow)  at least;PRN  -MM        Predicted Duration Therapy Intervention (Days)  until discharge  -MM        SLP Diet Recommendation  regular textures;thin liquids  -MM        Recommended Diagnostics  SLE/Cog/Motor Speech Evaluation  -MM        Recommended Precautions and Strategies  upright posture during/after eating;small bites of food and sips of liquid;general aspiration precautions;fatigue precautions  -MM        Oral Care Recommendations  Oral Care BID/PRN;Toothbrush  -MM        SLP Rec. for Method of Medication Administration  meds whole;as tolerated  -MM        Monitor for Signs of Aspiration  yes;notify SLP if any concerns  -MM        Anticipated Discharge Disposition (SLP)  skilled nursing facility  -MM           Swallow Goals (SLP)    Oral Nutrition/Hydration Goal Selection (SLP)  oral nutrition/hydration, SLP goal 1  -MM           Oral Nutrition/Hydration Goal 1 (SLP)    Oral Nutrition/Hydration Goal 1, SLP  Patient will tolerate LRD without s/s of aspiration.  -MM        Time Frame (Oral Nutrition/Hydration Goal 1, SLP)  by discharge  -MM        Barriers (Oral Nutrition/Hydration Goal 1, SLP)  none  -MM        Progress/Outcomes (Oral Nutrition/Hydration Goal 1, SLP)  goal ongoing  -MM          User Key  (r) = Recorded By, (t) = Taken By, (c) = Cosigned By    Initials Name Effective Dates    MM Maia Gonsalez MS CCC-SLP 07/12/20 -           EDUCATION  The patient has been educated in the following areas:   Dysphagia (Swallowing Impairment) Oral Care/Hydration.    SLP Recommendation and Plan  SLP Swallowing Diagnosis: R/O pharyngeal dysphagia  SLP Diet Recommendation: regular textures, thin liquids  Recommended Precautions and Strategies: upright posture during/after eating, small bites of food and sips of liquid, general aspiration precautions, fatigue  precautions  SLP Rec. for Method of Medication Administration: meds whole, as tolerated     Monitor for Signs of Aspiration: yes, notify SLP if any concerns  Recommended Diagnostics: SLE/Cog/Motor Speech Evaluation  Swallow Criteria for Skilled Therapeutic Interventions Met: demonstrates skilled criteria  Anticipated Discharge Disposition (SLP): skilled nursing facility  Rehab Potential/Prognosis, Swallowing: good, to achieve stated therapy goals  Therapy Frequency (Swallow): at least, PRN  Predicted Duration Therapy Intervention (Days): until discharge                         Plan of Care Reviewed With: patient, other (see comments) (JENNIFFER Suarez)  Progress: no change (Initial Evaluation)    SLP GOALS     Row Name 06/03/21 0928             Oral Nutrition/Hydration Goal 1 (SLP)    Oral Nutrition/Hydration Goal 1, SLP  Patient will tolerate LRD without s/s of aspiration.  -MM      Time Frame (Oral Nutrition/Hydration Goal 1, SLP)  by discharge  -MM      Barriers (Oral Nutrition/Hydration Goal 1, SLP)  none  -MM      Progress/Outcomes (Oral Nutrition/Hydration Goal 1, SLP)  goal ongoing  -MM        User Key  (r) = Recorded By, (t) = Taken By, (c) = Cosigned By    Initials Name Provider Type    Maia Garcia MS CCC-SLP Speech and Language Pathologist             Time Calculation:   Time Calculation- SLP     Row Name 06/03/21 1028             Time Calculation- SLP    SLP Start Time  0928  -MM      SLP Stop Time  1028  -MM      SLP Time Calculation (min)  60 min  -MM      SLP Received On  06/03/21  -MM      SLP Goal Re-Cert Due Date  06/13/21  -MM         Untimed Charges    SLP Eval/Re-eval   ST Eval Oral Pharyng Swallow - 71030  -MM      [id number] Eval Oral Pharyng Swallow Minutes  60  -MM         Total Minutes    Untimed Charges Total Minutes  60  -MM       Total Minutes  60  -MM        User Key  (r) = Recorded By, (t) = Taken By, (c) = Cosigned By    Initials Name Provider Type    Maia Garcia MS  CCC-SLP Speech and Language Pathologist          Therapy Charges for Today     Code Description Service Date Service Provider Modifiers Qty    [id number]  ST EVAL ORAL PHARYNG SWALLOW 4 6/3/2021 Maia Gonsalez, MS CCC-SLP GN 1               Maia Gonsalez MS CCC-SLP  6/3/2021

## 2021-06-03 NOTE — PLAN OF CARE
Goal Outcome Evaluation:  Plan of Care Reviewed With: patient, other (see comments) (JENNIFFER Suarez)  Progress: no change (Initial Evaluation)       Clinical bedside swallow evaluation completed. Patient was initially asleep but able to wake with verbal greeting. OT assisted patient to edge of bed for proper positioning for PO intake. RN reported poor oral care upon admission to hospital. She reports moldy substance on dentures. RN cleaned dentures thoroughly. During evaluation oral health was much improved. RN also reports that family may had been feeding patient when she was not alert enough to eat. There is also some mention of vomiting. Poor oral care, decreased alertness, and vomiting may all be contributing factors to concern with aspiration pneumonia. Patient does have a congested non-productive cough upon entering room.     Primary problem: Confusion after missed dialysis, possible aspiration pneumonia. 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     Oral motor assessment completed WFL. The patient completed a full range of consistencies except for mechanical soft. No overt s/s of aspiration were observed. Vocal quality remained clear and unchanged throughout. Functional rotary chew given regular solids.  SLP unable to rule out aspiration at bedside; however, no overt s/s were observed.     SLP recommends:   1) Regular diet   2) Thin liquids   3) Meds whole as tolerated   4) Oral care and standard swallow precautions     SLP spoke with JENNIFFER Suarez RE: holding PO if any decrease in alertness. SLP will follow up to ensure continued diet toleration. If any other concerns per MD with aspiration, patient may warrant an instrumental evaluation to fully assess pharyngeal function.       Maia Gonsalez, MS CCC-SLP  6/3/2021 10:27 CDT

## 2021-06-03 NOTE — PROGRESS NOTES
[facility] Medicine Services  INPATIENT PROGRESS NOTE    Patient Name: Mini Gentile  Date of Admission: 6/1/2021  Today's Date: 06/03/21  Length of Stay: 2  Primary Care Physician: Bharati Dhal APRN    Subjective   Chief Complaint: Altered mental status    HPI   Patient sleeping on arrival.  Easily awoken.  She is still somewhat altered but much proved/more interactive than yesterday.  Able to answer questions.  She is able to tell me she is in a hospital in [place] and that is the year 2021.  She not knows June.  She denies pain or shortness of breath.  Denies nausea.      Review of Systems   All pertinent negatives and positives are as above. All other systems have been reviewed and are negative unless otherwise stated.     Objective    Temp:  [97.2 °F (36.2 °C)-97.9 °F (36.6 °C)] 97.2 °F (36.2 °C)  Heart Rate:  [60-84] 75  Resp:  [16-27] 20  BP: ()/() 139/69  Physical Exam  GEN: drowsy but arousable, interactive, following commands, no acute distress  HEENT:  PERRLA, EOMI, Anicteric, Trachea midline  Lungs: coarse bs, no wheezing  Heart: RRR, +S1/s2, no rub  ABD: soft, nt/nd, +BS, no guarding/rebound  Extremities:  no cyanosis, no edema  Skin: no rashes or petechiae   Neuro: AAOx2, MONSALVE, good  b/l, no obvious focal deficits  Psych: flat        Results Review:  I have reviewed the labs, radiology results, and diagnostic studies.    Laboratory Data:   Results from last 7 days   Lab Units 06/03/21  0239 06/02/21  0318 06/01/21  1759   WBC 10*3/mm3 3.04* 2.93* 3.43   HEMOGLOBIN g/dL 9.5* 9.1* 10.1*   HEMATOCRIT % 30.4* 28.3* 31.8*   PLATELETS 10*3/mm3 174 175 273        Results from last 7 days   Lab Units 06/03/21  0239 06/02/21  0318 06/01/21  1910 06/01/21  1759   SODIUM mmol/L 132* 131* 131* 133*   POTASSIUM mmol/L 3.9 3.6 3.6 3.7   CHLORIDE mmol/L 93* 92* 89* 91*   CO2 mmol/L 26.0 29.0 26.0 28.0   BUN mg/dL 28* 25* 24* 23   CREATININE mg/dL 4.38* 3.90*  3.42* 3.30*   CALCIUM mg/dL 8.9 8.3* 9.1 9.1   BILIRUBIN mg/dL  --  0.3 0.4 0.4   ALK PHOS U/L  --  206* 260* 263*   ALT (SGPT) U/L  --  14 17 17   AST (SGOT) U/L  --  17 21 20   GLUCOSE mg/dL 119* 200* 144* 126*       Culture Data:   No results found for: BLOODCX, URINECX, WOUNDCX, MRSACX, RESPCX, STOOLCX    Radiology Data:   Imaging Results (Last 24 Hours)     ** No results found for the last 24 hours. **          I have reviewed the patient's current medications.     Assessment/Plan     Active Hospital Problems    Diagnosis    • **Altered mental status    • Hypoglycemia    • Aspiration into airway    • End stage renal failure on dialysis (CMS/[facility])    • Diabetes (CMS/[facility])    • HTN (hypertension)        #1 altered mental status/encephalopathy -unclear etiology.  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 She was also hypoglycemic on arrival.  Some reports that she responded to Narcan.  She has evidence for aspiration.  Currently being covered with Zosyn.  Head CT was nonacute on arrival.  Her mental status is improving over the last 24 hours.  Continue to monitor.  Intact neuro exam today.    #2 aspiration -patient was recently hospitalized for aspiration pneumonia after a procedure when she vomited.  CAT scan on arrival here does show some debris in her bronchus.  Currently altered.  Keep NPO.  Will need a speech evaluation when she wakes up.  Covering with Zosyn.    #3 ESRD - nephrology following for dialysis needs    #4 DM 2 - was hypoglycemic on arrival and was on d5 fluids, now off and doing fine. SPT eval today for po.  Hypoglycemia protocol.    #5 essential hypertension -blood pressure more stable today, has not been getting any meds yet due to npo status. Will resume when able.     #6 right humeral fracture -this occurred all the way back in March.  She was told to follow-up with orthopedics after discharge.  Unclear if she has seen  them or not.  Will discuss further as pt wakes up more    Patient previously was recommended for SNF but declined.   has called and states she is too weak and debilitated he cannot take care of at home.  We will get PT and OT to see her.  We will plan for SNF placement at discharge.      Discharge Planning: I expect the patient to be discharged to SNF when medically ready.  Will likely be here for several days. Could potentially move from icu in the next 24 hours if she continues to improve.     Electronically signed by Bin Guy DO, 06/03/21, 08:09 CDT.

## 2021-06-03 NOTE — PLAN OF CARE
Goal Outcome Evaluation:   Patient more alert today, PT/OT worked with patient today, recommending 2 assist and right arm in sling.  Sling at bedside for when patient is OOB.      Patient is agreeable to rehab after discharge from hospital.  Discussed continual noncompliance of dialysis treatments, patient realizes she needs dialysis and states she will continue with dialysis treatment.    Alert to person, place and time, disoriented to situation.  Passed her swallow eval.      Dialysis this afternoon, unsure if only filtered or if fluid removed.      Daughter, Arlette Barker (lives in [place]) [phone]  Son, Julio Cesar Gentile (works at [employer]) [phone]    Obtained verbal consent for dialysis treatment with daughter Arlette.

## 2021-06-03 NOTE — PLAN OF CARE
Goal Outcome Evaluation:  Plan of Care Reviewed With: patient     Outcome Summary: OT eval completed. Pt is alert and oriented to self and place, disoriented to time and situation. With proximal humeral fx from 3/27/21, per Ortho, NWB RUE in sling. Pt reports noncompliance with NWB status and use of sling at home. She was educated on importance of maintaining NWB and use of sling for healing fx. Pt continues to report pain in R shoulder from this fx. She required Min/Mod A for supine to sit at EOB. Max A for donning socks d/t weakness and pain in R UE. Min Ax2 for sit <> stand t/f from EOB. Continued mobility not completed at this time d/t pt cognitive status and weakness. She required multiple verbal cues to maintain NWB R UE throughout activity. She would benefit from skilled OT services to address these deficits. Recommend d/c to SNF for continued skilled therapy services.

## 2021-06-03 NOTE — PROGRESS NOTES
Nephrology ([facility]) Progress Note      Patient:  Mini Gentile  YOB: 1960  Date of Service: 6/3/2021  MRN: [id number]   Acct: [id number]   Primary Care Physician: Bharati Dahl APRN  Advance Directive:   Code Status and Medical Interventions:   Ordered at: 06/02/21 0310     Code Status:    CPR     Medical Interventions (Level of Support Prior to Arrest):    Full     Admit Date: 6/1/2021       Hospital Day: 2  Referring Provider: Tejinder Kent MD      Patient personally seen and examined.  Complete chart including Consults, Notes, Operative Reports, Labs, Cardiology, and Radiology studies reviewed as able.        Subjective:  Mini Gentile is a 61 y.o. female  whom we were consulted for end stage renal disease. Maintenance hemodialysis on Tuesday Thursday Saturday at [facility]. Fairly noncompliant with dialysis treatments but did attend on 6/01.  Discharged two weeks ago from [facility] following fistulogram/venoplasty of left extremity on 5/13.  Presented this time with altered mental status. She was hypoglycemic, treated with IV dextrose, and was also given Narcan.      Today is oriented to self only. No new overnight issues.     Allergies:  Bupropion, Chantix [varenicline], Gabapentin, Azithromycin, Levofloxacin, Penicillins, and Sulfa antibiotics    Home Meds:  Medications Prior to Admission   Medication Sig Dispense Refill Last Dose   • albuterol sulfate  (90 Base) MCG/ACT inhaler Inhale 2 puffs Every 4 (Four) Hours As Needed for Wheezing. (Patient taking differently: Inhale 2 puffs Every 6 (Six) Hours As Needed for Wheezing.) 18 g 0 Past Month at Unknown time   • aspirin 81 MG EC tablet Take 81 mg by mouth Daily.   6/1/2021 at Unknown time   • atropine 1 % ophthalmic solution Administer 1 drop into the left eye Daily.   6/1/2021 at Unknown time   • brimonidine (ALPHAGAN) 0.2 % ophthalmic solution Administer 1 drop into the left eye 2  (Two) Times a Day As Needed.   6/1/2021 at Unknown time   • carvedilol (COREG) 6.25 MG tablet Take 6.25 mg by mouth 2 (Two) Times a Day With Meals.   6/1/2021 at Unknown time   • cholecalciferol (VITAMIN D3) 1000 units tablet Take 2,000 Units by mouth Daily.   6/1/2021 at Unknown time   • dorzolamide (TRUSOPT) 2 % ophthalmic solution Administer 1 drop into the left eye 2 (Two) Times a Day.   6/1/2021 at Unknown time   • fluticasone (FLONASE) 50 MCG/ACT nasal spray 2 sprays into the nostril(s) as directed by provider 2 (Two) Times a Day.   Past Week at Unknown time   • insulin glargine (LANTUS) 100 UNIT/ML injection Inject 10 Units under the skin Every Night.   6/1/2021 at Unknown time   • ipratropium-albuterol (DUO-NEB) 0.5-2.5 mg/3 ml nebulizer Take 3 mL by nebulization Every 4 (Four) Hours As Needed for Wheezing or Shortness of Air.   Past Month at Unknown time   • losartan (COZAAR) 50 MG tablet Take 50 mg by mouth Daily. Pt was decreased to one daily.   6/1/2021 at Unknown time   • oxyCODONE-acetaminophen (PERCOCET) 7.5-325 MG per tablet Take 1 tablet by mouth Every 8 (Eight) Hours As Needed. Pt has one pill left. Last filled 5/21/21 6/1/2021 at Unknown time   • pantoprazole (PROTONIX) 40 MG EC tablet Take 1 tablet by mouth Daily. 30 tablet 1 6/1/2021 at Unknown time   • pregabalin (LYRICA) 50 MG capsule Take 50 mg by mouth 2 (Two) Times a Day.   6/1/2021 at Unknown time   • traMADol (ULTRAM) 50 MG tablet Take 50 mg by mouth Every 6 (Six) Hours As Needed for Moderate Pain . Pt takes one daily.   6/1/2021 at Unknown time   • Travoprost (TRAVATAN OP) Apply  to eye(s) as directed by provider.   5/31/2021   • pravastatin (PRAVACHOL) 10 MG tablet Take 10 mg by mouth Every Night.   5/31/2021   • Sucroferric Oxyhydroxide (Velphoro) 500 MG chewable tablet Chew 1 tablet. Takes with Dialysis   Unknown at Unknown time       Medicines:  Current Facility-Administered Medications   Medication Dose Route Frequency Provider  Last Rate Last Admin   • acetaminophen (TYLENOL) tablet 650 mg  650 mg Oral Q4H PRN Tejinder Kent MD        Or   • acetaminophen (TYLENOL) suppository 650 mg  650 mg Rectal Q4H PRN Tejinder Kent MD       • aspirin EC tablet 81 mg  81 mg Oral Daily Tejinder Kent MD       • atropine 1 % ophthalmic solution 1 drop  1 drop Left Eye Daily Tejinder Kent MD   1 drop at 06/03/21 0829   • brimonidine (ALPHAGAN) 0.2 % ophthalmic solution 1 drop  1 drop Left Eye TID Tejinder Kent MD   1 drop at 06/03/21 0830   • carvedilol (COREG) tablet 6.25 mg  6.25 mg Oral BID With Meals Tejinder Kent MD       • cholecalciferol (VITAMIN D3) tablet 2,000 Units  2,000 Units Oral Daily Tejinder Kent MD       • dextrose (D50W) 25 g/ 50mL Intravenous Solution 25 g  25 g Intravenous Q15 Min PRN Tejinder Kent MD       • dextrose (GLUTOSE) oral gel 15 g  15 g Oral Q15 Min PRN Tejinder Kent MD       • dorzolamide (TRUSOPT) 2 % ophthalmic solution 1 drop  1 drop Left Eye BID Tejinder Kent MD   1 drop at 06/03/21 0829   • enoxaparin (LOVENOX) syringe 30 mg  30 mg Subcutaneous Q24H Tejinder Kent MD   30 mg at 06/03/21 0829   • famotidine (PEPCID) tablet 20 mg  20 mg Oral Daily Bin Guy, DO       • Ferric Citrate tablet 840 mg  4 tablet Oral TID With Meals Tejinder Kent MD       • fluticasone (FLONASE) 50 MCG/ACT nasal spray 2 spray  2 spray Nasal BID Tejinder Kent MD   2 spray at 06/03/21 0829   • glucagon (human recombinant) (GLUCAGEN DIAGNOSTIC) injection 1 mg  1 mg Subcutaneous Q15 Min PRN Tejinder Kent MD       • insulin lispro (humaLOG) injection 2-7 Units  2-7 Units Subcutaneous TID AC Tejinder Kent MD       • ipratropium-albuterol (DUO-NEB) nebulizer solution 3 mL  3 mL Nebulization Q4H PRN Tejinder Kent MD       • lactobacillus acidophilus (RISAQUAD) capsule 1 capsule  1 capsule Oral Daily Tejinder Kent MD       • losartan (COZAAR) tablet 50 mg  50 mg  Oral BID Tejinder Kent MD       • metoclopramide (REGLAN) tablet 5 mg  5 mg Oral 4x Daily AC & at Bedtime Tejinder Kent MD       • midodrine (PROAMATINE) tablet 5 mg  5 mg Oral TID AC Tejinder Kent MD       • ondansetron (ZOFRAN) tablet 4 mg  4 mg Oral Q6H PRN Tejinder Kent MD       • Pharmacy to Dose Zosyn   Does not apply Continuous PRN Tejinder Kent MD       • piperacillin-tazobactam (ZOSYN) 3.375 g in iso-osmotic dextrose 50 ml (premix)  3.375 g Intravenous Q12H Tejinder Kent MD   3.375 g at 21 0829   • pravastatin (PRAVACHOL) tablet 10 mg  10 mg Oral Nightly Tejinder Kent MD       • sertraline (ZOLOFT) tablet 25 mg  25 mg Oral Daily Tejinder Kent MD       • sodium chloride 0.9 % flush 10 mL  10 mL Intravenous Q12H Tejinder Kent MD   10 mL at 21 0828   • sodium chloride 0.9 % flush 10 mL  10 mL Intravenous PRN Tejinder Kent MD       • timolol (TIMOPTIC) 0.5 % ophthalmic solution 1 drop  1 drop Left Eye Daily Tejinder Kent MD   1 drop at 21 0829   • vancomycin 750 mg/250 mL 0.9% NS IVPB ([facility])  750 mg Intravenous Once per day on Tue Thu Sat Tejinder Kent MD           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    • Chronic otitis media    • Diabetes (CMS/[facility])    • End stage renal disease on dialysis (CMS/[facility])    • Eustachian tube dysfunction    • GERD (gastroesophageal reflux disease)    • Glaucoma    • HTN (hypertension)    • Hyperlipidemia    • Mucoid otitis media    • Noncompliance of patient with renal dialysis (CMS/[facility])    • Tobacco abuse        Past Surgical History:  Past Surgical History:   Procedure Laterality Date   • ARTERIOVENOUS FISTULA     • CATARACT EXTRACTION WITH INTRAOCULAR LENS IMPLANT     •  SECTION     • CHOLECYSTECTOMY     • MYRINGOTOMY W/ TUBES Bilateral    • MYRINGOTOMY W/ TUBES Right    • TUBAL ABDOMINAL LIGATION         Family  History  Family History   Problem Relation Age of Onset   • Heart disease Mother    • Diabetes Father    • Colon cancer Neg Hx    • Colon polyps Neg Hx        Social History  Social History     Socioeconomic History   • Marital status: Single     Spouse name: Not on file   • Number of children: Not on file   • Years of education: Not on file   • Highest education level: Not on file   Tobacco Use   • Smoking status: Current Every Day Smoker     Packs/day: 2.00     Years: 6.00     Pack years: 12.00     Types: Cigarettes   • Smokeless tobacco: Never Used   Vaping Use   • Vaping Use: Never used   Substance and Sexual Activity   • Alcohol use: No   • Drug use: No   • Sexual activity: Defer         Review of Systems:  Unable to obtain due to altered mentation    Objective:  Patient Vitals for the past 24 hrs:   BP Temp Temp src Pulse Resp SpO2 Weight   06/03/21 0700 139/69 -- -- 75 20 100 % --   06/03/21 0600 91/44 -- -- 65 22 99 % --   06/03/21 0500 124/59 -- -- 72 24 98 % --   06/03/21 0400 142/70 97.2 °F (36.2 °C) Axillary 75 20 97 % 67.7 kg (149 lb 4.8 oz)   06/03/21 0300 100/48 -- -- 67 18 99 % --   06/03/21 0200 130/53 -- -- 72 16 98 % --   06/03/21 0000 100/49 -- -- 62 -- 100 % --   06/02/21 2300 92/45 -- -- 64 21 100 % --   06/02/21 2200 118/51 -- -- 68 26 100 % --   06/02/21 2100 150/68 -- -- 74 24 100 % --   06/02/21 2000 142/66 97.9 °F (36.6 °C) Oral 73 16 99 % --   06/02/21 1900 130/94 -- -- 80 27 98 % --   06/02/21 1800 160/82 -- -- 79 18 100 % --   06/02/21 1700 106/51 -- -- 66 18 100 % --   06/02/21 1600 116/53 -- -- 61 20 100 % --   06/02/21 1543 -- 97.7 °F (36.5 °C) Axillary -- -- -- --   06/02/21 1500 103/46 -- -- 60 17 100 % --   06/02/21 1400 123/54 -- -- 75 19 99 % --   06/02/21 1300 141/77 -- -- 75 17 100 % --   06/02/21 1200 129/82 -- -- 74 18 97 % --   06/02/21 1130 133/83 97.7 °F (36.5 °C) Axillary 75 -- 99 % --   06/02/21 1100 132/64 -- -- 79 18 100 % --   06/02/21 1045 117/99 -- -- 82 -- 99 % --    06/02/21 1035 151/67 -- -- 84 -- 96 % --   06/02/21 1000 143/86 -- -- 84 16 99 % --   06/02/21 0945 126/96 -- -- 82 -- 99 % --   06/02/21 0930 (!) 145/120 -- -- 82 -- 99 % --   06/02/21 0915 98/67 -- -- 83 -- 100 % --   06/02/21 0905 110/54 -- -- 73 -- 99 % --   06/02/21 0900 (!) 86/43 -- -- 66 16 100 % --   06/02/21 0845 92/46 -- -- 67 -- 99 % --       Intake/Output Summary (Last 24 hours) at 6/3/2021 0839  Last data filed at 6/3/2021 0829  Gross per 24 hour   Intake 761.8 ml   Output 50 ml   Net 711.8 ml     General: awake, oriented X 1   Neck: supple, no JVD  Chest:  clear to auscultation bilaterally without respiratory distress  CVS: regular rate and rhythm  Abdominal: soft, nontender, positive bowel sounds  Extremities: no cyanosis or edema  Skin: warm and dry without rash  Neuro: no focal motor deficits    Labs:  Results from last 7 days   Lab Units 06/03/21  0239 06/02/21  0318 06/01/21  1759   WBC 10*3/mm3 3.04* 2.93* 3.43   HEMOGLOBIN g/dL 9.5* 9.1* 10.1*   HEMATOCRIT % 30.4* 28.3* 31.8*   PLATELETS 10*3/mm3 174 175 273         Results from last 7 days   Lab Units 06/03/21  0239 06/02/21  0318 06/01/21  1910 06/01/21  1759   SODIUM mmol/L 132* 131* 131* 133*   POTASSIUM mmol/L 3.9 3.6 3.6 3.7   CHLORIDE mmol/L 93* 92* 89* 91*   CO2 mmol/L 26.0 29.0 26.0 28.0   BUN mg/dL 28* 25* 24* 23   CREATININE mg/dL 4.38* 3.90* 3.42* 3.30*   CALCIUM mg/dL 8.9 8.3* 9.1 9.1   BILIRUBIN mg/dL  --  0.3 0.4 0.4   ALK PHOS U/L  --  206* 260* 263*   ALT (SGPT) U/L  --  14 17 17   AST (SGOT) U/L  --  17 21 20   GLUCOSE mg/dL 119* 200* 144* 126*       Radiology:   Imaging Results (Last 72 Hours)     Procedure Component Value Units Date/Time    CT Abdomen Pelvis Without Contrast [[id number]] Collected: 06/01/21 1952     Updated: 06/01/21 1957    Narrative:      CT ABDOMEN PELVIS WO CONTRAST- 6/1/2021 6:51 PM CDT     HISTORY: AMS; R41.82-Altered mental status, unspecified;  E16.2-Hypoglycemia, unspecified      COMPARISON: Chest  CT dated 6/1/2021      DOSE LENGTH PRODUCT: 709 mGy cm. Automated exposure control was also  utilized to decrease patient radiation dose.     TECHNIQUE: Noncontrast enhanced images of the abdomen and pelvis  obtained without oral contrast. Multiplanar reformatted images were  provided for review.      FINDINGS:      LIVER: No focal liver lesion within limits of a noncontrast study.      BILIARY SYSTEM: The gallbladder is surgically absent. No intrahepatic or  extrahepatic ductal dilatation.      PANCREAS: No focal pancreatic lesion within limits of a noncontrast  study.      SPLEEN: Unremarkable.      KIDNEYS: Bilateral renal atrophy. No hydronephrosis. The ureters are  decompressed and normal in appearance.     ADRENALS: Unremarkable.     RETROPERITONEUM: No mass, lymphadenopathy or hemorrhage.      GI TRACT: The stomach and small bowel are unremarkable. Moderate colonic  stool. No inflammatory changes along the bowel.      OTHER: Bilateral upper thigh and diffuse body wall subcutaneous edema  reflecting systemic volume overload. Trace ascites which I suspect is  also related to the systemic volume overload. No acute bony abnormality.     PELVIS: No mass lesion, fluid collection or significant lymphadenopathy  is seen in the pelvis. The urinary bladder is normal in appearance.       Impression:      1. Anasarca. Otherwise, no acute process involving the abdomen or  pelvis.  This report was finalized on 06/01/2021 19:54 by Dr Jhonatan Berry, .    CT Chest Without Contrast Diagnostic [[id number]] Collected: 06/01/21 1948     Updated: 06/01/21 1955    Narrative:      CT CHEST WO CONTRAST DIAGNOSTIC- 6/1/2021 6:51 PM CDT     HISTORY: AMS; R41.82-Altered mental status, unspecified;  E16.2-Hypoglycemia, unspecified     COMPARISON: CT scan dated 1/19/2020     DOSE LENGTH PRODUCT: 257 mGy cm. Automated exposure control was also  utilized to decrease patient radiation dose.     TECHNIQUE: Serial helical tomographic images of  the chest were acquired  without contrast. Multiplanar reformatted images were provided for  review.     FINDINGS:     Visualized neck base: The imaged portion of the base of the neck appears  unremarkable.      Lungs: Right middle and right lower lobe airways are completely  opacified with debris and there is atelectasis of both of these lobes.  Interlobular septal thickening and peribronchial thickening with  bilateral groundglass opacities, reflecting pulmonary edema. There is a  moderate to large pleural effusion on the right. 7 mm left upper lobe  pulmonary nodule (series 3-image 47).     Heart: Prominent four-chamber cardiomegaly. There is no pericardial  effusion. Advanced coronary artery atheromatous calcification.     Vasculature: There is calcified atheromatous plaque in the aorta without  aneurysmal dilation. The pulmonary arteries are enlarged, suggestive of  pulmonary arterial hypertension.     Mediastinum and lymph nodes: No suspicious hilar or mediastinal  adenopathy..     Skeletal and soft tissues: Diffuse chest wall edema. Comminuted  fracturing of the right proximal humerus.       Impression:      1. Volume overload with interstitial and casey pulmonary edema. There is  also a moderate to large right pleural effusion.  2. There is debris diffusely opacifying the right middle and right lower  lobe airways with atelectasis of both of these lobes, suspected mucous  plugging or aspirated contents.  3. Comminuted fracturing of the right proximal humerus.  This report was finalized on 06/01/2021 19:52 by Dr Jhonatan Berry, .    CT Head Without Contrast [[id number]] Collected: 06/01/21 1930     Updated: 06/01/21 1934    Narrative:      CT HEAD WO CONTRAST- 6/1/2021 6:51 PM CDT     HISTORY: Mental status change, unknown cause; R41.82-Altered mental  status, unspecified; E16.2-Hypoglycemia, unspecified       DOSE LENGTH PRODUCT: 668 mGy cm. Automated exposure control was also  utilized to decrease patient  radiation dose.     Technique:   Axial CT of the brain without IV contrast. Sagittal and coronal  reformations are also provided for review. Soft tissue and bone kernels  are available for interpretation.     Comparison: CT scan dated 4/3/2021.     Findings:      There is no evidence of acute large vascular territory infarct. No  intra-axial or extra-axial hemorrhage. No visualized mass lesion or mass  effect. The ventricles, cortical sulci and basal cisterns are symmetric  and age appropriate.  Posterior fossa structures are unremarkable. The  scalp and calvarium are intact. Visualized paranasal sinuses and  mastoids are clear.        Impression:      Impression:    1. No acute intracranial process. Stable exam.  This report was finalized on 06/01/2021 19:31 by Dr Jhonatan Berry, .    XR Chest 1 View [[id number]] Collected: 06/01/21 1834     Updated: 06/01/21 1837    Narrative:      Frontal upright radiograph of the chest 6/1/2021 6:10 PM CDT     HISTORY: Central line     COMPARISON: 6/1/2021.       Impression:      FINDINGS/IMPRESSION:      Right IJ central line is in good position. Otherwise stable. No  pneumothorax.  This report was finalized on 06/01/2021 18:34 by Dr Jhonatan Berry, .    XR Chest 1 View [[id number]] Collected: 06/01/21 1715     Updated: 06/01/21 1720    Narrative:      Frontal upright radiograph of the chest 6/1/2021 5:12 PM CDT     HISTORY: Altered mental status     COMPARISON: Chest exam dated 5/15/2021.     FINDINGS:      Stable moderate to large layering right pleural effusion with continued  opacification of the right lung base. Left lung is clear. Heart size is  stable. The pulmonary vasculature are nondilated. No pneumothorax.  Partially imaged impacted fracture across the surgical neck of the right  proximal humerus.       Impression:      1. No significant interval change from the recent comparison exam. There  is a moderate to large layering right pleural effusion with  opacification of  the right lung base.        This report was finalized on 06/01/2021 17:17 by Dr Jhonatan Brery, .          Culture:  Blood Culture   Date Value Ref Range Status   06/01/2021 No growth at 24 hours  Preliminary   06/01/2021 Abnormal Stain (C)  Preliminary         Assessment   1.  End stage renal disease on HD TTS  2.  Metabolic encephalopathy  3.  Sepsis  4.  Type 2 diabetes with hypoglycemia  5.  Hypertension  6.  Anemia of CKD  7.  COPD    Plan:  1.  Dialysis today  2.  OK from renal standpoint to remove indwelling catheter  3.  Monitor labs      Gomez Lemos, ANTHONY  6/3/2021  08:39 CDT

## 2021-06-03 NOTE — PLAN OF CARE
Problem: Restraint, Nonbehavioral (Nonviolent)  Goal: Discontinuation Criteria Achieved  Outcome: Ongoing, Progressing  Intervention: Implement Least-restrictive Safety Strategies  Recent Flowsheet Documentation  Taken 6/3/2021 1200 by Daniela Prabhakar RN  Medical Device Protection: IV pole/bag removed from visual field  Taken 6/3/2021 0800 by Daniela Prabhakar RN  Medical Device Protection: IV pole/bag removed from visual field  Diversional Activities: television  Goal: Personal Dignity and Safety Maintained  Outcome: Ongoing, Progressing  Intervention: Protect Dignity, Rights, and Personal Wellbeing  Recent Flowsheet Documentation  Taken 6/3/2021 1200 by Daniela Prabhakar RN  Trust Relationship/Rapport:   care explained   choices provided   emotional support provided   empathic listening provided   questions answered   questions encouraged   reassurance provided   thoughts/feelings acknowledged  Taken 6/3/2021 0800 by Daniela Prabhakar RN  Trust Relationship/Rapport:   care explained   reassurance provided  Intervention: Protect Skin and Joint Integrity  Recent Flowsheet Documentation  Taken 6/3/2021 1600 by Daniela Prabhakar RN  Body Position:   turned   side-lying, right  Taken 6/3/2021 1500 by Daniela Prabhakar RN  Body Position: position maintained  Taken 6/3/2021 1400 by Daniela Prabhakar RN  Body Position:   turned   side-lying, left  Taken 6/3/2021 1300 by Daniela Prabhakar RN  Body Position: position maintained  Taken 6/3/2021 1200 by Daniela Prabhakar RN  Body Position:   turned   side-lying, right  Taken 6/3/2021 1100 by Daniela Prabhakar RN  Body Position: position maintained  Taken 6/3/2021 1000 by Daniela Prabhakar RN  Body Position:   turned   side-lying, left  Taken 6/3/2021 0900 by Daniela Prabhakar RN  Body Position: position maintained  Taken 6/3/2021 0800 by Daniela Prabhakar RN  Body Position:   turned   side-lying, right  Range of Motion: active ROM  (range of motion) encouraged     Problem: Fall Injury Risk  Goal: Absence of Fall and Fall-Related Injury  Outcome: Ongoing, Progressing  Intervention: Identify and Manage Contributors to Fall Injury Risk  Recent Flowsheet Documentation  Taken 6/3/2021 1600 by Daniela Prabhakar RN  Medication Review/Management: medications reviewed  Taken 6/3/2021 1500 by Daniela Prabhakar RN  Medication Review/Management: medications reviewed  Taken 6/3/2021 1400 by Daniela Prabhakar RN  Medication Review/Management: medications reviewed  Taken 6/3/2021 1300 by Daniela Prabhakar RN  Medication Review/Management: medications reviewed  Taken 6/3/2021 1200 by Daniela Prabhakar RN  Medication Review/Management: medications reviewed  Taken 6/3/2021 1100 by Daniela Prabhakar RN  Medication Review/Management: medications reviewed  Taken 6/3/2021 1000 by Daniela Prabhakar RN  Medication Review/Management: medications reviewed  Taken 6/3/2021 0900 by Daniela Prabhakar RN  Medication Review/Management: medications reviewed  Taken 6/3/2021 0800 by Daniela Prabhakar RN  Medication Review/Management: medications reviewed  Intervention: Promote Injury-Free Environment  Recent Flowsheet Documentation  Taken 6/3/2021 1600 by Whitenight, Daniela, RN  Safety Promotion/Fall Prevention:   safety round/check completed   fall prevention program maintained  Taken 6/3/2021 1500 by Whitenight, Daniela, RN  Safety Promotion/Fall Prevention:   safety round/check completed   fall prevention program maintained  Taken 6/3/2021 1400 by Whitenight, Daniela, RN  Safety Promotion/Fall Prevention:   safety round/check completed   fall prevention program maintained  Taken 6/3/2021 1300 by Daniela Prabhakar RN  Safety Promotion/Fall Prevention: safety round/check completed  Taken 6/3/2021 1200 by Whitenight, Daniela, RN  Safety Promotion/Fall Prevention:   safety round/check completed   fall prevention program maintained  Taken 6/3/2021  1100 by Whitenight, Daniela, RN  Safety Promotion/Fall Prevention:   safety round/check completed   fall prevention program maintained  Taken 6/3/2021 1000 by Whitenight, Daniela, RN  Safety Promotion/Fall Prevention:   safety round/check completed   fall prevention program maintained  Taken 6/3/2021 0900 by Daniela Prabhakar RN  Safety Promotion/Fall Prevention:   safety round/check completed   fall prevention program maintained  Taken 6/3/2021 0800 by Daniela Prabhakar RN  Safety Promotion/Fall Prevention:   safety round/check completed   fall prevention program maintained     Problem: Skin Injury Risk Increased  Goal: Skin Health and Integrity  Outcome: Ongoing, Progressing  Intervention: Optimize Skin Protection  Recent Flowsheet Documentation  Taken 6/3/2021 1600 by Daniela Prabhakar RN  Head of Bed (HOB): HOB elevated  Taken 6/3/2021 1500 by Daniela Prabhakar RN  Head of Bed (HOB): HOB elevated  Taken 6/3/2021 1400 by Daniela Prabhakar RN  Head of Bed (HOB): HOB elevated  Taken 6/3/2021 1300 by Daniela Prabhakar RN  Head of Bed (HOB): HOB elevated  Taken 6/3/2021 1200 by Daniela Prabhakar RN  Head of Bed (HOB): HOB elevated  Taken 6/3/2021 1100 by Daniela Prabhakar RN  Head of Bed (HOB): HOB elevated  Taken 6/3/2021 1000 by Daniela Prabhakar RN  Head of Bed (HOB): HOB elevated  Taken 6/3/2021 0900 by Daniela Prabhakar RN  Head of Bed (HOB): HOB elevated  Taken 6/3/2021 0800 by Daniela Prabhakar RN  Head of Bed (HOB): HOB elevated     Problem: Adult Inpatient Plan of Care  Goal: Plan of Care Review  Outcome: Ongoing, Progressing  Goal: Patient-Specific Goal (Individualized)  Outcome: Ongoing, Progressing  Goal: Absence of Hospital-Acquired Illness or Injury  Outcome: Ongoing, Progressing  Intervention: Identify and Manage Fall Risk  Recent Flowsheet Documentation  Taken 6/3/2021 1600 by Whitenight, Daniela, RN  Safety Promotion/Fall Prevention:   safety round/check completed    fall prevention program maintained  Taken 6/3/2021 1500 by Whitenight, Daniela, RN  Safety Promotion/Fall Prevention:   safety round/check completed   fall prevention program maintained  Taken 6/3/2021 1400 by Whitenight, Daniela, RN  Safety Promotion/Fall Prevention:   safety round/check completed   fall prevention program maintained  Taken 6/3/2021 1300 by Daniela Prabhakar RN  Safety Promotion/Fall Prevention: safety round/check completed  Taken 6/3/2021 1200 by Whitenight, Daniela, RN  Safety Promotion/Fall Prevention:   safety round/check completed   fall prevention program maintained  Taken 6/3/2021 1100 by Whitenight, Daniela, RN  Safety Promotion/Fall Prevention:   safety round/check completed   fall prevention program maintained  Taken 6/3/2021 1000 by Whitenight, Daniela, RN  Safety Promotion/Fall Prevention:   safety round/check completed   fall prevention program maintained  Taken 6/3/2021 0900 by Daniela Prabhakar RN  Safety Promotion/Fall Prevention:   safety round/check completed   fall prevention program maintained  Taken 6/3/2021 0800 by Daniela Prabhakar RN  Safety Promotion/Fall Prevention:   safety round/check completed   fall prevention program maintained  Intervention: Prevent Skin Injury  Recent Flowsheet Documentation  Taken 6/3/2021 1600 by Daniela Prabhakar RN  Body Position:   turned   side-lying, right  Taken 6/3/2021 1500 by Daniela Prabhakar RN  Body Position: position maintained  Taken 6/3/2021 1400 by Daniela Prabhakar RN  Body Position:   turned   side-lying, left  Taken 6/3/2021 1300 by Daniela Prabhakar RN  Body Position: position maintained  Taken 6/3/2021 1200 by Daniela Prabhakar RN  Body Position:   turned   side-lying, right  Taken 6/3/2021 1100 by Daniela Prabhakar RN  Body Position: position maintained  Taken 6/3/2021 1000 by Daniela Prabhakar RN  Body Position:   turned   side-lying, left  Taken 6/3/2021 0900 by Daniela Prabhakar RN  Body  Position: position maintained  Taken 6/3/2021 0800 by Daniela Prabhakar RN  Body Position:   turned   side-lying, right  Intervention: Prevent and Manage VTE (venous thromboembolism) Risk  Recent Flowsheet Documentation  Taken 6/3/2021 1200 by Daniela Prabhakar RN  VTE Prevention/Management: (see mar) other (see comments)  Taken 6/3/2021 0800 by Daniela Prabhakar RN  VTE Prevention/Management: (see mar) other (see comments)  Intervention: Prevent Infection  Recent Flowsheet Documentation  Taken 6/3/2021 0800 by Daniela Prabhakar RN  Infection Prevention:   environmental surveillance performed   equipment surfaces disinfected   hand hygiene promoted   rest/sleep promoted  Goal: Optimal Comfort and Wellbeing  Outcome: Ongoing, Progressing  Intervention: Provide Person-Centered Care  Recent Flowsheet Documentation  Taken 6/3/2021 1200 by Daniela Prabhakar RN  Trust Relationship/Rapport:   care explained   choices provided   emotional support provided   empathic listening provided   questions answered   questions encouraged   reassurance provided   thoughts/feelings acknowledged  Taken 6/3/2021 0800 by Daniela Prabhakar RN  Trust Relationship/Rapport:   care explained   reassurance provided  Goal: Readiness for Transition of Care  Outcome: Ongoing, Progressing     Problem: Adjustment to Illness (Chronic Kidney Disease)  Goal: Optimal Coping with Chronic Illness  Outcome: Ongoing, Progressing  Intervention: Support and Optimize Psychosocial Response to Chronic Illness  Recent Flowsheet Documentation  Taken 6/3/2021 1200 by Daniela Prabhakar RN  Family/Support System Care: support provided  Taken 6/3/2021 0800 by Daniela Prabhakar RN  Family/Support System Care: support provided     Problem: Electrolyte Imbalance (Chronic Kidney Disease)  Goal: Electrolyte Balance  Outcome: Ongoing, Progressing     Problem: Fluid Volume Excess (Chronic Kidney Disease)  Goal: Fluid Balance  Outcome: Ongoing,  Progressing     Problem: Functional Decline (Chronic Kidney Disease)  Goal: Optimal Functional Ability  Outcome: Ongoing, Progressing  Intervention: Optimize Functional Ability  Recent Flowsheet Documentation  Taken 6/3/2021 1600 by Daniela Prabhakar RN  Activity Management: activity adjusted per tolerance  Taken 6/3/2021 1500 by Daniela Prabhakar RN  Activity Management: activity adjusted per tolerance  Taken 6/3/2021 1400 by Daniela Prabhakar RN  Activity Management: activity adjusted per tolerance  Taken 6/3/2021 1300 by Daniela Prabhakar RN  Activity Management: activity adjusted per tolerance  Taken 6/3/2021 1200 by Daniela Prabhakar RN  Activity Management: activity adjusted per tolerance  Taken 6/3/2021 1100 by Daniela Prabhakar RN  Activity Management: activity adjusted per tolerance  Taken 6/3/2021 1000 by Daniela Prabhakar RN  Activity Management: activity adjusted per tolerance  Taken 6/3/2021 0900 by Daniela Prabhakar RN  Activity Management: activity adjusted per tolerance  Taken 6/3/2021 0800 by Daniela Prabhakar RN  Activity Management: activity adjusted per tolerance     Problem: Hematologic Alteration (Chronic Kidney Disease)  Goal: Absence of Anemia Signs and Symptoms  Outcome: Ongoing, Progressing     Problem: Oral Intake Inadequate (Chronic Kidney Disease)  Goal: Optimal Oral Intake  Outcome: Ongoing, Progressing     Problem: Renal Function Impairment (Chronic Kidney Disease)  Goal: Laboratory Values and Blood Pressure Within Desired Range  Outcome: Ongoing, Progressing  Intervention: Monitor and Support Renal Function  Recent Flowsheet Documentation  Taken 6/3/2021 1600 by Daniela Prabhakar RN  Medication Review/Management: medications reviewed  Taken 6/3/2021 1500 by Daniela Prabhakar RN  Medication Review/Management: medications reviewed  Taken 6/3/2021 1400 by Daniela Prabhakar RN  Medication Review/Management: medications reviewed  Taken 6/3/2021 1300 by  Daniela Prabhakar RN  Medication Review/Management: medications reviewed  Taken 6/3/2021 1200 by Daniela Prabhakar RN  Medication Review/Management: medications reviewed  Taken 6/3/2021 1100 by Daniela Prabhakar RN  Medication Review/Management: medications reviewed  Taken 6/3/2021 1000 by Daniela Prabhakar RN  Medication Review/Management: medications reviewed  Taken 6/3/2021 0900 by Daniela Prabhakar RN  Medication Review/Management: medications reviewed  Taken 6/3/2021 0800 by Daniela Prabhakar RN  Medication Review/Management: medications reviewed     Problem: Fluid Imbalance (Pneumonia)  Goal: Fluid Balance  Outcome: Ongoing, Progressing     Problem: Infection (Pneumonia)  Goal: Resolution of Infection Signs and Symptoms  Outcome: Ongoing, Progressing     Problem: Respiratory Compromise (Pneumonia)  Goal: Effective Oxygenation and Ventilation  Outcome: Ongoing, Progressing  Intervention: Optimize Oxygenation and Ventilation  Recent Flowsheet Documentation  Taken 6/3/2021 1600 by Daniela Prabhakar RN  Head of Bed (HOB): HOB elevated  Taken 6/3/2021 1500 by Daniela Prabhakar RN  Head of Bed (HOB): HOB elevated  Taken 6/3/2021 1400 by Daniela Prabhakar RN  Head of Bed (HOB): HOB elevated  Taken 6/3/2021 1300 by Daniela Prabhakar RN  Head of Bed (HOB): HOB elevated  Taken 6/3/2021 1200 by Daniela Prabhakar RN  Head of Bed (HOB): HOB elevated  Taken 6/3/2021 1100 by Daniela Prabhakar RN  Head of Bed (HOB): HOB elevated  Taken 6/3/2021 1000 by Daniela Prabhakar RN  Head of Bed (HOB): HOB elevated  Taken 6/3/2021 0900 by Daniela Prabhakar RN  Head of Bed (HOB): HOB elevated  Taken 6/3/2021 0800 by Daniela Prabhakar RN  Head of Bed (HOB): HOB elevated     Problem: Diabetes Comorbidity  Goal: Blood Glucose Level Within Desired Range  Outcome: Ongoing, Progressing  Intervention: Maintain Glycemic Control  Recent Flowsheet Documentation  Taken 6/3/2021 1200 by Vanna  JENNIFFER Suarez  Glycemic Management: blood glucose monitoring  Taken 6/3/2021 0800 by Daniela Prabhakar RN  Glycemic Management: blood glucose monitoring     Problem: Heart Failure Comorbidity  Goal: Maintenance of Heart Failure Symptom Control  Outcome: Ongoing, Progressing  Intervention: Maintain Heart Failure-Management Strategies  Recent Flowsheet Documentation  Taken 6/3/2021 1600 by Daniela Prabhakar RN  Medication Review/Management: medications reviewed  Taken 6/3/2021 1500 by Daniela Prabhakar RN  Medication Review/Management: medications reviewed  Taken 6/3/2021 1400 by Daniela Prabhakar RN  Medication Review/Management: medications reviewed  Taken 6/3/2021 1300 by Daniela Prabhakar RN  Medication Review/Management: medications reviewed  Taken 6/3/2021 1200 by Daniela Prabhakar RN  Medication Review/Management: medications reviewed  Taken 6/3/2021 1100 by Daniela Prabhakar RN  Medication Review/Management: medications reviewed  Taken 6/3/2021 1000 by Daniela Prabhakar RN  Medication Review/Management: medications reviewed  Taken 6/3/2021 0900 by Daniela Prabhakar RN  Medication Review/Management: medications reviewed  Taken 6/3/2021 0800 by Daniela Prabhakar RN  Medication Review/Management: medications reviewed     Problem: Hypertension Comorbidity  Goal: Blood Pressure in Desired Range  Outcome: Ongoing, Progressing  Intervention: Maintain Hypertension-Management Strategies  Recent Flowsheet Documentation  Taken 6/3/2021 1600 by Daniela Prabhakar RN  Medication Review/Management: medications reviewed  Taken 6/3/2021 1500 by Daniela Prabhakar RN  Medication Review/Management: medications reviewed  Taken 6/3/2021 1400 by Daniela Prabhakar RN  Medication Review/Management: medications reviewed  Taken 6/3/2021 1300 by Daniela Prabhakar RN  Medication Review/Management: medications reviewed  Taken 6/3/2021 1200 by Daniela Prabhakar RN  Medication Review/Management:  medications reviewed  Taken 6/3/2021 1100 by Daniela Prabhakar, RN  Medication Review/Management: medications reviewed  Taken 6/3/2021 1000 by Daniela Prabhakar, RN  Medication Review/Management: medications reviewed  Taken 6/3/2021 0900 by Daniela Prabhakar, RN  Medication Review/Management: medications reviewed  Taken 6/3/2021 0800 by Daniela Prabhakar, RN  Medication Review/Management: medications reviewed   Goal Outcome Evaluation:

## 2021-06-03 NOTE — PROGRESS NOTES
"Pharmacy Dosing Service  Pharmacokinetics  Vancomycin Follow-up Evaluation     Assessment/Action/Plan:  Current Order: Vancomycin 750 mg IVPB every Tuesday, Thursday, Saturday post-dialysis  Current end date:6/10/21  Levels: none at this time  Additional antimicrobial agent(s): Zosyn     HD patient, on HD TTS per nephrology.   Patient received 1250 mg IV dose around 9PM 6/1/21  HD planned for today. Scheduled dose of 750 mg IV in place post-HD  Continue current regimen.     Pharmacy will continue to follow daily      Subjective:  Mini Gentile is a 61 y.o. female currently on Vancomycin 750 mg IV every Tuesday, Thursday, Saturday post-dialysis for the treatment of sepsis, day 3 of treatment.           Objective:  Ht: 139.7 cm (55\"); Wt: 67.7 kg (149 lb 4.8 oz)  Estimated Creatinine Clearance: 10.9 mL/min (A) (by C-G formula based on SCr of 4.38 mg/dL (H)).         Creatinine   Date Value Ref Range Status   06/03/2021 4.38 (H) 0.57 - 1.00 mg/dL Final   06/02/2021 3.90 (H) 0.57 - 1.00 mg/dL Final   06/01/2021 3.42 (H) 0.57 - 1.00 mg/dL Final   02/19/2021 7.2 (H) 0.5 - 0.9 mg/dL Final   10/10/2020 7.2 (H) 0.5 - 0.9 mg/dL Final   09/01/2020 5.1 (H) 0.5 - 0.9 mg/dL Final            Lab Results   Component Value Date     WBC 3.04 (L) 06/03/2021     WBC 2.93 (L) 06/02/2021     WBC 3.43 06/01/2021               Lab Results   Component Value Date     VANCORANDOM 12.30 05/02/2020         Culture Results:           Microbiology Results (last 10 days)      Procedure Component Value - Date/Time     Blood Culture - Blood, Arm, Right [[id number]] Collected: 06/01/21 1711     Lab Status: Preliminary result Specimen: Blood from Arm, Right Updated: 06/02/21 1730       Blood Culture No growth at 24 hours     Blood Culture - Blood, Arm, Right [[id number]]  (Abnormal) Collected: 06/01/21 1705     Lab Status: Preliminary result Specimen: Blood from Arm, Right Updated: 06/02/21 1541       Blood Culture Abnormal Stain       Gram Stain " Anaerobic Bottle Gram positive cocci         Aerobic Bottle Gram positive cocci     Blood Culture ID, PCR - Blood, Arm, Right [[id number]]  (Abnormal) Collected: 06/01/21 1705     Lab Status: Final result Specimen: Blood from Arm, Right Updated: 06/02/21 1548       BCID, PCR Staphylococcus spp, not aureus. Identification by BCID PCR.       BOTTLE TYPE Anaerobic Bottle     COVID PRE-OP / PRE-PROCEDURE SCREENING ORDER (NO ISOLATION) - Swab, Nasal Cavity [[id number]]  (Normal) Collected: 05/24/21 1427     Lab Status: Final result Specimen: Swab from Nasal Cavity Updated: 05/24/21 2038     Narrative:       The following orders were created for panel order COVID PRE-OP / PRE-PROCEDURE SCREENING ORDER (NO ISOLATION) - Swab, Nasal Cavity.  Procedure                               Abnormality         Status                     ---------                               -----------         ------                     COVID-19,APTIMA PANTHER,...[[id number]]  Normal              Final result                  Please view results for these tests on the individual orders.     COVID-19,APTIMA PANTHER,PAD IN-HOUSE,NP/OP/NASAL SWAB IN UTM/VTM/SALINE/LIQUID AMIES TRANSPORT MEDIA/NP WASH OR ASPIRATE, 24 HR TAT - Swab, Nasal Cavity [[id number]]  (Normal) Collected: 05/24/21 1427     Lab Status: Final result Specimen: Swab from Nasal Cavity Updated: 05/24/21 2038       COVID19 Not Detected     Narrative:       Fact sheet for providers: https://www.fda.gov/media/781747/download      Fact sheet for patients: https://www.fda.gov/media/584147/download     Test performed by RT PCR.             Frankie Schuster, LaneyD   06/03/21 12:00 CDT

## 2021-06-03 NOTE — CASE MANAGEMENT/SOCIAL WORK
Continued Stay Note  [facility]     Patient Name: Mini Gentile  MRN: [id number]  Today's Date: 6/3/2021    Admit Date: 6/1/2021    Discharge Plan     Row Name 06/03/21 1148       Plan    Plan  Referrals pending at [facility]    Patient/Family in Agreement with Plan  yes    Plan Comments  [facility] is unable to accept patient as she has not received the COVID vaccine and they do not currently have an available bed in their observation unit for unvaccinated patients.  REINA updated patient's significant other and also confirmed patient had not received her COVID vaccine with the dialysis clinic [phone].  Awaiting response from [facility] and [facility].    Row Name 06/03/21 1106       Plan    Plan  SNF placement for rehab    Provided Post Acute Provider List?  Yes    Post Acute Provider List  Nursing Home    Delivered To  Patient    Method of Delivery  In person    Patient/Family in Agreement with Plan  yes    Plan Comments  SW spoke to patient regarding referrals for rehab placement.  Patient agreed to referrals to [facility] and [facility].  Conversation regarding placement occurred with therapy.  Will proceed with referrals and await response.        Discharge Codes    No documentation.             GONZALES RamirezW

## 2021-06-04 NOTE — PLAN OF CARE
"Goal Outcome Evaluation:  Plan of Care Reviewed With: patient  Progress: no change  Outcome Summary: PT evaluation completed. The patient presents alert and oriented to self and place only. She is does not remember or follow her NWB restrictions on the R UE. She frequently calls out for her SO \"Jose.\" She is generally weak and  unsafe to be up by herself. She may forget she isn't allow up or use her R UE. The patient will benefit from skilled PT services to work on strengthening, balance, and gait stability. Recommend discharge to SNF.  "

## 2021-06-04 NOTE — CASE MANAGEMENT/SOCIAL WORK
Discharge Planning Assessment  [facility]     Patient Name: Mini Gentile  MRN: [id number]  Today's Date: 6/4/2021    Admit Date: 6/1/2021    Discharge Needs Assessment    No documentation.       Discharge Plan     Row Name 06/04/21 0917       Plan    Plan  Waiting on bed offer    Patient/Family in Agreement with Plan  yes    Plan Comments  In process of contacting [facility] and [facility] to see if pt will have bed offer from either facility. Will follow.        Continued Care and Services - Admitted Since 6/1/2021     Destination     Service Provider Request Status Selected Services Address Phone Fax Patient Preferred    [facility]  Pending - No Request Sent N/A [id number], [place] [address] [phone] --    [facility]  P[facility] - No Request Sent N/A [id number], [place] [address] [phone] --    [facility]  Declined  Patient unvaccinated for COVID and no beds available. N/A [address], [place] [address] [phone] --            Selected Continued Care - Prior Encounters Includes selections from prior encounters from 3/3/2021 to 6/4/2021    Discharged on 4/8/2021 Admission date: 4/3/2021 - Discharge disposition: Home-Health Care [facility]     Service Provider Selected Services Address Phone Fax Patient Preferred    [facility] [address] [address] [phone] --                Discharged on 3/28/2021 Admission date: 3/26/2021 - Discharge disposition: Home-Health Care [facility]     Service Provider Selected Services Address Phone Fax Patient Preferred    [facility] [address] [address] [phone] --                      Demographic Summary    No documentation.       Functional Status    No documentation.       Psychosocial    No  documentation.       Abuse/Neglect    No documentation.       Legal    No documentation.       Substance Abuse    No documentation.       Patient Forms    No documentation.           SACHI Douglas

## 2021-06-04 NOTE — NURSING NOTE
Patient transferred to room 357 via bed and lift team. Condition stable.  Bedside report given to JENNIFFER eMdina.

## 2021-06-04 NOTE — PROGRESS NOTES
Nephrology ([facility]) Progress Note      Patient:  Mini Gentile  YOB: 1960  Date of Service: 6/4/2021  MRN: [id number]   Acct: [id number]   Primary Care Physician: Bharati Dahl APRN  Advance Directive:   Code Status and Medical Interventions:   Ordered at: 06/02/21 0310     Code Status:    CPR     Medical Interventions (Level of Support Prior to Arrest):    Full     Admit Date: 6/1/2021       Hospital Day: 3  Referring Provider: Tejinder Kent MD      Patient personally seen and examined.  Complete chart including Consults, Notes, Operative Reports, Labs, Cardiology, and Radiology studies reviewed as able.        Subjective:  Mini Gentile is a 61 y.o. female  whom we were consulted for end stage renal disease. Maintenance hemodialysis on Tuesday Thursday Saturday at [facility]. Fairly noncompliant with dialysis treatments but did attend on 6/01.  Discharged two weeks ago from [facility] following fistulogram/venoplasty of left extremity on 5/13.  Presented this time with altered mental status. She was hypoglycemic, treated with IV dextrose, and was also given Narcan.  Has been tolerating HD treatments well.    Today is awake, oriented X 1. No new overnight issues.     Allergies:  Bupropion, Chantix [varenicline], Gabapentin, Azithromycin, Levofloxacin, Penicillins, and Sulfa antibiotics    Home Meds:  Medications Prior to Admission   Medication Sig Dispense Refill Last Dose   • albuterol sulfate  (90 Base) MCG/ACT inhaler Inhale 2 puffs Every 4 (Four) Hours As Needed for Wheezing. (Patient taking differently: Inhale 2 puffs Every 6 (Six) Hours As Needed for Wheezing.) 18 g 0 Past Month at Unknown time   • aspirin 81 MG EC tablet Take 81 mg by mouth Daily.   6/1/2021 at Unknown time   • atropine 1 % ophthalmic solution Administer 1 drop into the left eye Daily.   6/1/2021 at Unknown time   • brimonidine (ALPHAGAN) 0.2 % ophthalmic solution  Administer 1 drop into the left eye 2 (Two) Times a Day As Needed.   6/1/2021 at Unknown time   • carvedilol (COREG) 6.25 MG tablet Take 6.25 mg by mouth 2 (Two) Times a Day With Meals.   6/1/2021 at Unknown time   • cholecalciferol (VITAMIN D3) 1000 units tablet Take 2,000 Units by mouth Daily.   6/1/2021 at Unknown time   • dorzolamide (TRUSOPT) 2 % ophthalmic solution Administer 1 drop into the left eye 2 (Two) Times a Day.   6/1/2021 at Unknown time   • fluticasone (FLONASE) 50 MCG/ACT nasal spray 2 sprays into the nostril(s) as directed by provider 2 (Two) Times a Day.   Past Week at Unknown time   • insulin glargine (LANTUS) 100 UNIT/ML injection Inject 10 Units under the skin Every Night.   6/1/2021 at Unknown time   • ipratropium-albuterol (DUO-NEB) 0.5-2.5 mg/3 ml nebulizer Take 3 mL by nebulization Every 4 (Four) Hours As Needed for Wheezing or Shortness of Air.   Past Month at Unknown time   • losartan (COZAAR) 50 MG tablet Take 50 mg by mouth Daily. Pt was decreased to one daily.   6/1/2021 at Unknown time   • oxyCODONE-acetaminophen (PERCOCET) 7.5-325 MG per tablet Take 1 tablet by mouth Every 8 (Eight) Hours As Needed. Pt has one pill left. Last filled 5/21/21 6/1/2021 at Unknown time   • pantoprazole (PROTONIX) 40 MG EC tablet Take 1 tablet by mouth Daily. 30 tablet 1 6/1/2021 at Unknown time   • pregabalin (LYRICA) 50 MG capsule Take 50 mg by mouth 2 (Two) Times a Day.   6/1/2021 at Unknown time   • traMADol (ULTRAM) 50 MG tablet Take 50 mg by mouth Every 6 (Six) Hours As Needed for Moderate Pain . Pt takes one daily.   6/1/2021 at Unknown time   • Travoprost (TRAVATAN OP) Apply  to eye(s) as directed by provider.   5/31/2021   • pravastatin (PRAVACHOL) 10 MG tablet Take 10 mg by mouth Every Night.   5/31/2021   • Sucroferric Oxyhydroxide (Velphoro) 500 MG chewable tablet Chew 1 tablet. Takes with Dialysis   Unknown at Unknown time       Medicines:  Current Facility-Administered Medications    Medication Dose Route Frequency Provider Last Rate Last Admin   • acetaminophen (TYLENOL) tablet 650 mg  650 mg Oral Q4H PRN Tejinder Kent MD        Or   • acetaminophen (TYLENOL) suppository 650 mg  650 mg Rectal Q4H PRN Tejinder Kent MD       • aspirin EC tablet 81 mg  81 mg Oral Daily Tejinder Kent MD   81 mg at 06/04/21 0900   • atropine 1 % ophthalmic solution 1 drop  1 drop Left Eye Daily Tejinder Kent MD   1 drop at 06/03/21 0829   • brimonidine (ALPHAGAN) 0.2 % ophthalmic solution 1 drop  1 drop Left Eye TID Tejinder Kent MD   1 drop at 06/03/21 2105   • cholecalciferol (VITAMIN D3) tablet 2,000 Units  2,000 Units Oral Daily Tejinder Kent MD   2,000 Units at 06/04/21 0900   • dextrose (D50W) 25 g/ 50mL Intravenous Solution 25 g  25 g Intravenous Q15 Min PRN Tejinder Kent MD       • dextrose (GLUTOSE) oral gel 15 g  15 g Oral Q15 Min PRN Tejinder Kent MD       • dorzolamide (TRUSOPT) 2 % ophthalmic solution 1 drop  1 drop Left Eye BID Tejinder Kent MD   1 drop at 06/03/21 2104   • enoxaparin (LOVENOX) syringe 30 mg  30 mg Subcutaneous Q24H Tejinder Kent MD   30 mg at 06/04/21 0903   • famotidine (PEPCID) tablet 20 mg  20 mg Oral Daily Bin Guy DO   20 mg at 06/04/21 0900   • Ferric Citrate tablet 840 mg  4 tablet Oral TID With Meals Tejinder Kent MD   840 mg at 06/04/21 0900   • fluticasone (FLONASE) 50 MCG/ACT nasal spray 2 spray  2 spray Nasal BID Tejinder Kent MD   2 spray at 06/03/21 2106   • glucagon (human recombinant) (GLUCAGEN DIAGNOSTIC) injection 1 mg  1 mg Subcutaneous Q15 Min PRN Tejinder Kent MD       • insulin lispro (humaLOG) injection 2-7 Units  2-7 Units Subcutaneous TID AC Tejinder Kent MD   2 Units at 06/03/21 1127   • ipratropium-albuterol (DUO-NEB) nebulizer solution 3 mL  3 mL Nebulization Q4H PRN Tejinder Kent MD       • lactobacillus acidophilus (RISAQUAD) capsule 1 capsule  1 capsule Oral Daily  Tejinder Kent MD   1 capsule at 21 0859   • metoclopramide (REGLAN) tablet 5 mg  5 mg Oral 4x Daily AC & at Bedtime Tejinder Kent MD   5 mg at 21 0612   • midodrine (PROAMATINE) tablet 5 mg  5 mg Oral TID AC Tejinder Kent MD   5 mg at 21 0612   • ondansetron (ZOFRAN) tablet 4 mg  4 mg Oral Q6H PRN Tejinder Kent MD       • Pharmacy to Dose Zosyn   Does not apply Continuous PRN Tejinder Kent MD       • piperacillin-tazobactam (ZOSYN) 3.375 g in iso-osmotic dextrose 50 ml (premix)  3.375 g Intravenous Q12H Tejinder Kent MD   3.375 g at 21 0859   • pravastatin (PRAVACHOL) tablet 10 mg  10 mg Oral Nightly Tejinder Kent MD   10 mg at 21   • sertraline (ZOLOFT) tablet 25 mg  25 mg Oral Daily Tejinder Kent MD   25 mg at 21 0900   • sodium chloride 0.9 % flush 10 mL  10 mL Intravenous Q12H Tejinder Kent MD   10 mL at 21 0900   • sodium chloride 0.9 % flush 10 mL  10 mL Intravenous PRN Tejinder Kent MD       • timolol (TIMOPTIC) 0.5 % ophthalmic solution 1 drop  1 drop Left Eye Daily Tejinder Kent MD   1 drop at 21 0829   • vancomycin 750 mg/250 mL 0.9% NS IVPB ([facility])  750 mg Intravenous Once per day on Tue Thu Sat Tejinder Kent MD   750 mg at 21 1722       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    • Chronic otitis media    • Diabetes (CMS/[facility])    • End stage renal disease on dialysis (CMS/[facility])    • Eustachian tube dysfunction    • GERD (gastroesophageal reflux disease)    • Glaucoma    • HTN (hypertension)    • Hyperlipidemia    • Mucoid otitis media    • Noncompliance of patient with renal dialysis (CMS/[facility])    • Tobacco abuse        Past Surgical History:  Past Surgical History:   Procedure Laterality Date   • ARTERIOVENOUS FISTULA     • CATARACT EXTRACTION WITH INTRAOCULAR LENS IMPLANT     •  SECTION     • CHOLECYSTECTOMY      • MYRINGOTOMY W/ TUBES Bilateral    • MYRINGOTOMY W/ TUBES Right    • TUBAL ABDOMINAL LIGATION         Family History  Family History   Problem Relation Age of Onset   • Heart disease Mother    • Diabetes Father    • Colon cancer Neg Hx    • Colon polyps Neg Hx        Social History  Social History     Socioeconomic History   • Marital status: Single     Spouse name: Not on file   • Number of children: Not on file   • Years of education: Not on file   • Highest education level: Not on file   Tobacco Use   • Smoking status: Current Every Day Smoker     Packs/day: 2.00     Years: 6.00     Pack years: 12.00     Types: Cigarettes   • Smokeless tobacco: Never Used   Vaping Use   • Vaping Use: Never used   Substance and Sexual Activity   • Alcohol use: No   • Drug use: No   • Sexual activity: Defer         Review of Systems:  Unable to obtain due to altered mentation    Objective:  Patient Vitals for the past 24 hrs:   BP Temp Temp src Pulse Resp SpO2 Weight   06/04/21 0743 139/61 97.8 °F (36.6 °C) Axillary 85 20 94 % --   06/04/21 0322 108/50 97.6 °F (36.4 °C) Axillary 80 22 94 % --   06/03/21 2236 118/66 97.5 °F (36.4 °C) Oral 79 24 96 % 74 kg (163 lb 1.6 oz)   06/03/21 2220 92/47 -- -- 78 24 100 % --   06/03/21 2210 -- -- -- 75 -- 100 % --   06/03/21 2200 -- -- -- 77 -- 100 % --   06/03/21 2150 -- -- -- 75 -- 100 % --   06/03/21 2140 -- -- -- 78 -- 100 % --   06/03/21 2130 -- -- -- 79 -- 100 % --   06/03/21 2120 -- -- -- 80 -- 100 % --   06/03/21 2111 162/74 -- -- 82 26 100 % --   06/03/21 2110 -- -- -- 82 -- 99 % --   06/03/21 2100 157/100 -- -- 84 24 96 % --   06/03/21 2050 -- -- -- 78 -- 93 % --   06/03/21 2040 -- -- -- 80 -- 94 % --   06/03/21 2030 149/80 -- -- 81 24 95 % --   06/03/21 2020 -- -- -- 80 -- 100 % --   06/03/21 2010 -- -- -- 78 -- 100 % --   06/03/21 2000 152/71 97 °F (36.1 °C) Oral 81 20 100 % --   06/03/21 1950 -- -- -- 78 -- 100 % --   06/03/21 1940 -- -- -- 81 -- 100 % --   06/03/21 1930 144/53  -- -- 79 -- 100 % --   06/03/21 1920 -- -- -- 77 -- 100 % --   06/03/21 1910 -- -- -- 75 -- 98 % --   06/03/21 1900 127/53 -- -- 75 -- 100 % --   06/03/21 1800 147/58 -- -- 79 20 99 % --   06/03/21 1700 143/61 -- -- 72 20 100 % --   06/03/21 1600 146/62 98.3 °F (36.8 °C) Axillary 73 20 99 % --   06/03/21 1500 154/64 -- -- 70 20 99 % --   06/03/21 1400 143/67 -- -- 69 22 100 % --   06/03/21 1333 98/50 -- -- 63 -- -- --   06/03/21 1330 98/50 -- -- 64 20 99 % --   06/03/21 1300 101/54 -- -- 65 20 99 % --   06/03/21 1200 159/72 -- -- 74 20 99 % --   06/03/21 1131 -- 97.6 °F (36.4 °C) Axillary -- -- -- --   06/03/21 1100 109/44 -- -- 67 20 99 % --   06/03/21 1000 135/54 -- -- 77 17 97 % --       Intake/Output Summary (Last 24 hours) at 6/4/2021 0950  Last data filed at 6/3/2021 1800  Gross per 24 hour   Intake 850 ml   Output --   Net 850 ml     General: awake, oriented X 1   Neck: supple, no JVD  Chest:  clear to auscultation bilaterally without respiratory distress  CVS: regular rate and rhythm  Abdominal: soft, nontender, positive bowel sounds  Extremities: no cyanosis or edema  Skin: warm and dry without rash  Neuro: no focal motor deficits    Labs:  Results from last 7 days   Lab Units 06/04/21  0449 06/03/21  0239 06/02/21  0318   WBC 10*3/mm3 2.79* 3.04* 2.93*   HEMOGLOBIN g/dL 10.2* 9.5* 9.1*   HEMATOCRIT % 31.2* 30.4* 28.3*   PLATELETS 10*3/mm3 146 174 175         Results from last 7 days   Lab Units 06/04/21  0449 06/03/21  0239 06/02/21  0318 06/01/21  1910 06/01/21  1759   SODIUM mmol/L 132* 132* 131* 131* 133*   POTASSIUM mmol/L 4.3 3.9 3.6 3.6 3.7   CHLORIDE mmol/L 97* 93* 92* 89* 91*   CO2 mmol/L 19.0* 26.0 29.0 26.0 28.0   BUN mg/dL 15 28* 25* 24* 23   CREATININE mg/dL 2.98* 4.38* 3.90* 3.42* 3.30*   CALCIUM mg/dL 9.0 8.9 8.3* 9.1 9.1   BILIRUBIN mg/dL  --   --  0.3 0.4 0.4   ALK PHOS U/L  --   --  206* 260* 263*   ALT (SGPT) U/L  --   --  14 17 17   AST (SGOT) U/L  --   --  17 21 20   GLUCOSE mg/dL 104*  119* 200* 144* 126*       Radiology:   Imaging Results (Last 72 Hours)     Procedure Component Value Units Date/Time    CT Abdomen Pelvis Without Contrast [[id number]] Collected: 06/01/21 1952     Updated: 06/01/21 1957    Narrative:      CT ABDOMEN PELVIS WO CONTRAST- 6/1/2021 6:51 PM CDT     HISTORY: AMS; R41.82-Altered mental status, unspecified;  E16.2-Hypoglycemia, unspecified      COMPARISON: Chest CT dated 6/1/2021      DOSE LENGTH PRODUCT: 709 mGy cm. Automated exposure control was also  utilized to decrease patient radiation dose.     TECHNIQUE: Noncontrast enhanced images of the abdomen and pelvis  obtained without oral contrast. Multiplanar reformatted images were  provided for review.      FINDINGS:      LIVER: No focal liver lesion within limits of a noncontrast study.      BILIARY SYSTEM: The gallbladder is surgically absent. No intrahepatic or  extrahepatic ductal dilatation.      PANCREAS: No focal pancreatic lesion within limits of a noncontrast  study.      SPLEEN: Unremarkable.      KIDNEYS: Bilateral renal atrophy. No hydronephrosis. The ureters are  decompressed and normal in appearance.     ADRENALS: Unremarkable.     RETROPERITONEUM: No mass, lymphadenopathy or hemorrhage.      GI TRACT: The stomach and small bowel are unremarkable. Moderate colonic  stool. No inflammatory changes along the bowel.      OTHER: Bilateral upper thigh and diffuse body wall subcutaneous edema  reflecting systemic volume overload. Trace ascites which I suspect is  also related to the systemic volume overload. No acute bony abnormality.     PELVIS: No mass lesion, fluid collection or significant lymphadenopathy  is seen in the pelvis. The urinary bladder is normal in appearance.       Impression:      1. Anasarca. Otherwise, no acute process involving the abdomen or  pelvis.  This report was finalized on 06/01/2021 19:54 by Dr Jhonatan Berry, .    CT Chest Without Contrast Diagnostic [[id number]] Collected: 06/01/21  1948     Updated: 06/01/21 1955    Narrative:      CT CHEST WO CONTRAST DIAGNOSTIC- 6/1/2021 6:51 PM CDT     HISTORY: AMS; R41.82-Altered mental status, unspecified;  E16.2-Hypoglycemia, unspecified     COMPARISON: CT scan dated 1/19/2020     DOSE LENGTH PRODUCT: 257 mGy cm. Automated exposure control was also  utilized to decrease patient radiation dose.     TECHNIQUE: Serial helical tomographic images of the chest were acquired  without contrast. Multiplanar reformatted images were provided for  review.     FINDINGS:     Visualized neck base: The imaged portion of the base of the neck appears  unremarkable.      Lungs: Right middle and right lower lobe airways are completely  opacified with debris and there is atelectasis of both of these lobes.  Interlobular septal thickening and peribronchial thickening with  bilateral groundglass opacities, reflecting pulmonary edema. There is a  moderate to large pleural effusion on the right. 7 mm left upper lobe  pulmonary nodule (series 3-image 47).     Heart: Prominent four-chamber cardiomegaly. There is no pericardial  effusion. Advanced coronary artery atheromatous calcification.     Vasculature: There is calcified atheromatous plaque in the aorta without  aneurysmal dilation. The pulmonary arteries are enlarged, suggestive of  pulmonary arterial hypertension.     Mediastinum and lymph nodes: No suspicious hilar or mediastinal  adenopathy..     Skeletal and soft tissues: Diffuse chest wall edema. Comminuted  fracturing of the right proximal humerus.       Impression:      1. Volume overload with interstitial and casey pulmonary edema. There is  also a moderate to large right pleural effusion.  2. There is debris diffusely opacifying the right middle and right lower  lobe airways with atelectasis of both of these lobes, suspected mucous  plugging or aspirated contents.  3. Comminuted fracturing of the right proximal humerus.  This report was finalized on 06/01/2021 19:52  by Dr Jhonatan Berry, .    CT Head Without Contrast [[id number]] Collected: 06/01/21 1930     Updated: 06/01/21 1934    Narrative:      CT HEAD WO CONTRAST- 6/1/2021 6:51 PM CDT     HISTORY: Mental status change, unknown cause; R41.82-Altered mental  status, unspecified; E16.2-Hypoglycemia, unspecified       DOSE LENGTH PRODUCT: 668 mGy cm. Automated exposure control was also  utilized to decrease patient radiation dose.     Technique:   Axial CT of the brain without IV contrast. Sagittal and coronal  reformations are also provided for review. Soft tissue and bone kernels  are available for interpretation.     Comparison: CT scan dated 4/3/2021.     Findings:      There is no evidence of acute large vascular territory infarct. No  intra-axial or extra-axial hemorrhage. No visualized mass lesion or mass  effect. The ventricles, cortical sulci and basal cisterns are symmetric  and age appropriate.  Posterior fossa structures are unremarkable. The  scalp and calvarium are intact. Visualized paranasal sinuses and  mastoids are clear.        Impression:      Impression:    1. No acute intracranial process. Stable exam.  This report was finalized on 06/01/2021 19:31 by Dr Jhonatan Berry, .    XR Chest 1 View [[id number]] Collected: 06/01/21 1834     Updated: 06/01/21 1837    Narrative:      Frontal upright radiograph of the chest 6/1/2021 6:10 PM CDT     HISTORY: Central line     COMPARISON: 6/1/2021.       Impression:      FINDINGS/IMPRESSION:      Right IJ central line is in good position. Otherwise stable. No  pneumothorax.  This report was finalized on 06/01/2021 18:34 by Dr Jhonatan Berry, .    XR Chest 1 View [[id number]] Collected: 06/01/21 1715     Updated: 06/01/21 1720    Narrative:      Frontal upright radiograph of the chest 6/1/2021 5:12 PM CDT     HISTORY: Altered mental status     COMPARISON: Chest exam dated 5/15/2021.     FINDINGS:      Stable moderate to large layering right pleural effusion with  continued  opacification of the right lung base. Left lung is clear. Heart size is  stable. The pulmonary vasculature are nondilated. No pneumothorax.  Partially imaged impacted fracture across the surgical neck of the right  proximal humerus.       Impression:      1. No significant interval change from the recent comparison exam. There  is a moderate to large layering right pleural effusion with  opacification of the right lung base.        This report was finalized on 06/01/2021 17:17 by Dr Jhonatan Berry, .          Culture:  Blood Culture   Date Value Ref Range Status   06/01/2021 No growth at 24 hours  Preliminary   06/01/2021 Abnormal Stain (C)  Preliminary         Assessment   1.  End stage renal disease on HD TTS  2.  Metabolic encephalopathy  3.  Sepsis  4.  Type 2 diabetes with hypoglycemia  5.  Hypertension  6.  Anemia of CKD  7.  COPD  7.  Hyponatremia     Plan:  1.  Dialysis next due 6/05  2.  Monitor labs      Gomez Lemos, ANTHONY  6/4/2021  09:50 CDT

## 2021-06-04 NOTE — PLAN OF CARE
Goal Outcome Evaluation:  Plan of Care Reviewed With: patient, other (see comments) (JENNIFFER Best)  Progress: improving         Swallow treatment completed. The patient was alert and cooperative. RN reports no concern with toleration of current diet. Patient continues with congested nonproductive cough when I enter the room today. She completed trials of thin liquids and regular solids. No overt s/s of aspiration observed. Functional chew with regular solids. Patient OK to continue regular diet with thin liquids. Will follow up.       Maia Gonsalez MS CCC-SLP 6/4/2021 13:49 CDT

## 2021-06-04 NOTE — PROGRESS NOTES
[facility] Medicine Services  INPATIENT PROGRESS NOTE    Patient Name: Mini Gentile  Date of Admission: 6/1/2021  Today's Date: 06/04/21  Length of Stay: 3  Primary Care Physician: Bharati Dahl APRN    Subjective   Chief Complaint: Altered mental status    HPI:  Patient seen with staff at bedside.  Awake and interactive.  Still minimally off for orientation.  Knows she is in [place] at the [facility] at 2021.  She thought it was July instead of June.  Otherwise says she is feeling a little better.  Denies chest pain or shortness of breath.  No nausea or vomiting.  Continues to be afebrile.      Review of Systems   All pertinent negatives and positives are as above. All other systems have been reviewed and are negative unless otherwise stated.     Objective    Temp:  [97 °F (36.1 °C)-98.3 °F (36.8 °C)] 97.8 °F (36.6 °C)  Heart Rate:  [63-85] 85  Resp:  [17-26] 20  BP: ()/() 139/61  Physical Exam  GEN: Awake and interactive, following commands, no acute distress  HEENT:  PERRLA, Anicteric, Trachea midline  Lungs: coarse bs, no wheezing  Heart: RRR, +S1/s2, no rub  ABD: soft, nt/nd, +BS, no guarding/rebound  Extremities:  no cyanosis, no edema  Skin: no rashes or petechiae   Neuro: AAOx2, MONSALVE, good  b/l, no obvious focal deficits  Psych: flat        Results Review:  I have reviewed the labs, radiology results, and diagnostic studies.    Laboratory Data:   Results from last 7 days   Lab Units 06/04/21 0449 06/03/21 0239 06/02/21 0318   WBC 10*3/mm3 2.79* 3.04* 2.93*   HEMOGLOBIN g/dL 10.2* 9.5* 9.1*   HEMATOCRIT % 31.2* 30.4* 28.3*   PLATELETS 10*3/mm3 146 174 175        Results from last 7 days   Lab Units 06/04/21  0449 06/03/21  0239 06/02/21  0318 06/01/21  1910 06/01/21  1759   SODIUM mmol/L 132* 132* 131* 131* 133*   POTASSIUM mmol/L 4.3 3.9 3.6 3.6 3.7   CHLORIDE mmol/L 97* 93* 92* 89* 91*   CO2 mmol/L 19.0* 26.0 29.0 26.0 28.0   BUN mg/dL 15 28* 25*  24* 23   CREATININE mg/dL 2.98* 4.38* 3.90* 3.42* 3.30*   CALCIUM mg/dL 9.0 8.9 8.3* 9.1 9.1   BILIRUBIN mg/dL  --   --  0.3 0.4 0.4   ALK PHOS U/L  --   --  206* 260* 263*   ALT (SGPT) U/L  --   --  14 17 17   AST (SGOT) U/L  --   --  17 21 20   GLUCOSE mg/dL 104* 119* 200* 144* 126*       Culture Data:   No results found for: BLOODCX, URINECX, WOUNDCX, MRSACX, RESPCX, STOOLCX    Radiology Data:   Imaging Results (Last 24 Hours)     ** No results found for the last 24 hours. **          I have reviewed the patient's current medications.     Assessment/Plan     Active Hospital Problems    Diagnosis    • **Altered mental status    • Hypoglycemia    • Aspiration into airway    • End stage renal failure on dialysis (CMS/[facility])    • Diabetes (CMS/[facility])    • HTN (hypertension)        #1 altered mental status/encephalopathy -unclear etiology.  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 She was also hypoglycemic on arrival.  Some reports that she responded to Narcan.  She has evidence for aspiration.  Currently being covered with Zosyn.  Head CT was nonacute on arrival.  Her mental status continues to improve with supportive care and treatment.  Continue to monitor.  Intact neuro exam     #2 aspiration -patient was recently hospitalized for aspiration pneumonia after a procedure when she vomited.  CAT scan on arrival here does show some debris in her bronchus.  Currently altered.  Keep NPO.  Will need a speech evaluation when she wakes up.  Covering with Zosyn.    #3 ESRD - nephrology following for dialysis needs    #4 DM 2 -continue sliding scale insulin and hypoglycemia protocol    #5 essential hypertension -blood pressure more stable today despite none of her home BP meds.  Continue monitor.    #6 right humeral fracture -this occurred all the way back in March.  She was told to follow-up with orthopedics after discharge.  Unclear if she has seen them  or not.      Patient previously was recommended for SNF but declined.   has called and states she is too weak and debilitated he cannot take care of at home.  We will plan for SNF placement at discharge.      Discharge Planning: I expect the patient to be discharged to SNF when medically ready.  Hopefully next 24-48 hours.     Electronically signed by Bin Guy DO, 06/04/21, 09:20 CDT.

## 2021-06-04 NOTE — THERAPY TREATMENT NOTE
Acute Care - Speech Language Pathology   Swallow Treatment Note [facility]     Patient Name: Mini Gentile  : 1960  MRN: [id number]  Today's Date: 2021               Admit Date: 2021  Swallow treatment completed. The patient was alert and cooperative. RN reports no concern with toleration of current diet. Patient continues with congested nonproductive cough when I enter the room today. She completed trials of thin liquids and regular solids. No overt s/s of aspiration observed. Functional chew with regular solids. Patient OK to continue regular diet with thin liquids. Will follow up.   Maia Gonsalez, MS CCC-SLP 2021 13:51 CDT    Visit Dx:     ICD-10-CM ICD-9-CM   1. Altered mental status, unspecified altered mental status type  R41.82 780.97   2. Hypoglycemia  E16.2 251.2   3. Aspiration pneumonia, unspecified aspiration pneumonia type, unspecified laterality, unspecified part of lung (CMS/[facility])  J69.0 507.0   4. Dysphagia, unspecified type  R13.10 787.20   5. 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)   • End stage renal failure on dialysis (CMS/[facility])   • Diabetes (CMS/[facility])   • Glaucoma   • HTN (hypertension)   • Proteinuria   • Type 2 diabetes mellitus, with long-term current use of insulin (CMS/[facility])   • Chronic anemia   • Acute pulmonary edema (CMS/[facility])   • Non-compliance with renal dialysis (CMS/[facility])   • Elevated troponin due to ESRD    • Hyperkalemia   • Encephalopathy, metabolic, due to uremia   • High anion gap metabolic acidosis due to uremia   • Respiratory distress   • Acute diastolic heart failure (CMS/[facility])   • Lymph node enlargement, left axillary measuring 1.1 cm -follow-up for primary care   • Nausea vomiting and diarrhea   • Acute diastolic heart  failure (CMS/[facility])   • Diabetes mellitus with ophthalmic complication (CMS/[facility])   • Tobacco abuse   • Urinary tract infection with hematuria   • Pneumonia due to infectious organism   • Abnormal urine findings   • Closed fracture of right proximal humerus   • Hyponatremia   • Hypothyroidism (acquired)   • Forehead laceration secondary to fall   • Heme positive stool   • Dialysis AV fistula malfunction, initial encounter (CMS/[facility])   • Aspiration into airway   • Bronchospasm, acute   • Altered mental status   • Protein-calorie malnutrition (CMS/[facility])   • Pupil irregular   • Sepsis (CMS/[facility])   • Hypoglycemia     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    • Chronic otitis media    • Diabetes (CMS/[facility])    • End stage renal disease on dialysis (CMS/[facility])    • Eustachian tube dysfunction    • GERD (gastroesophageal reflux disease)    • Glaucoma    • HTN (hypertension)    • Hyperlipidemia    • Mucoid otitis media    • Noncompliance of patient with renal dialysis (CMS/[facility])    • Tobacco abuse      Past Surgical History:   Procedure Laterality Date   • ARTERIOVENOUS FISTULA     • CATARACT EXTRACTION WITH INTRAOCULAR LENS IMPLANT     •  SECTION     • CHOLECYSTECTOMY     • MYRINGOTOMY W/ TUBES Bilateral    • MYRINGOTOMY W/ TUBES Right    • TUBAL ABDOMINAL LIGATION          SWALLOW EVALUATION (last 72 hours)      SLP Adult Swallow Evaluation     Row Name 21 1033 21 0928                Rehab Evaluation    Document Type  therapy note (daily note)  -MM  evaluation  -MM       Subjective Information  no complaints  -MM  no complaints  -MM       Patient Observations  alert;cooperative;agree to therapy  -MM  alert;cooperative;agree to therapy  -MM       Patient/Family/Caregiver Comments/Observations  No family present.  -MM  No family present. OT Haritha present.   -MM       Care Plan Review  care plan/treatment goals reviewed  -MM  evaluation/treatment results reviewed   -MM       Care Plan Review, Other Participant(s)  other (see comments) JENNIFFER Best   -MASOUD  other (see comments) JENNIFFER Suarez   -MASOUD       Patient Effort  good  -MM  good  -MM       Symptoms Noted During/After Treatment  --  none  -MM          General Information    Patient Profile Reviewed  --  yes  -MM       Pertinent History Of Current Problem  --  Primary problem: Confusion after missed dialysis, possible aspiration pneumonia. 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   -MM       Current Method of Nutrition  --  NPO  -MM       Precautions/Limitations, Vision  --  WFL;for purposes of eval  -MM       Precautions/Limitations, Hearing  --  WFL;for purposes of eval  -MM       Prior Level of Function-Communication  --  WFL  -MM       Prior Level of Function-Swallowing  --  no diet consistency restrictions  -MM       Plans/Goals Discussed with  --  patient;other (see comments);agreed upon JENNIFFER CARTER       Barriers to Rehab  --  none identified  -MM       Patient's Goals for Discharge  --  patient did not state  -MM          Pain    Additional Documentation  Pain Scale: FACES Pre/Post-Treatment (Group)  -MM  Pain Scale: Numbers Pre/Post-Treatment (Group)  -MM          Pain Scale: Numbers Pre/Post-Treatment    Pretreatment Pain Rating  --  0/10 - no pain  -MM       Posttreatment Pain Rating  --  0/10 - no pain  -MM          Pain Scale: FACES Pre/Post-Treatment    Pain: FACES Scale, Pretreatment  0-->no hurt  -MM  --       Posttreatment Pain Rating  0-->no hurt  -MM  --          Oral Motor Structure and Function    Dentition Assessment  --  upper dentures/partial in place;lower dentures/partial in place  -MM       Secretion Management  --  WNL/WFL  -MM       Mucosal Quality  --  moist, healthy  -MM       Volitional Swallow  --   WFL  -MM       Volitional Cough  --  WFL  -MM          Oral Musculature and Cranial Nerve Assessment    Oral Motor General Assessment  --  WFL  -MM          General Eating/Swallowing Observations    Respiratory Support Currently in Use  --  nasal cannula  -MM       Eating/Swallowing Skills  --  fed by SLP  -MM       Positioning During Eating  --  upright in bed Edge of bed  -MM       Utensils Used  --  spoon;straw  -MM       Consistencies Trialed  --  regular textures;honey-thick liquids;nectar/syrup-thick liquids;thin liquids;pureed  -MM          Clinical Swallow Eval    Oral Prep Phase  --  WFL  -MM       Oral Transit  --  WFL  -MM       Oral Residue  --  WFL  -MM       Pharyngeal Phase  --  no overt signs/symptoms of pharyngeal impairment  -MM       Esophageal Phase  --  unremarkable  -MM       Clinical Swallow Evaluation Summary  --  See note.  -MM          Clinical Impression    Daily Summary of Progress (SLP)  progress toward functional goals as expected  -MM  --       Barriers to Overall Progress (SLP)  None  -MM  None  -MM       SLP Swallowing Diagnosis  --  R/O pharyngeal dysphagia  -MM       Functional Impact  --  risk of aspiration/pneumonia  -MM       Rehab Potential/Prognosis, Swallowing  --  good, to achieve stated therapy goals  -MM       Swallow Criteria for Skilled Therapeutic Interventions Met  --  demonstrates skilled criteria  -MM       Plan for Continued Treatment (SLP)  Continue to follow   -MM  --          Recommendations    Therapy Frequency (Swallow)  --  at least;PRN  -MM       Predicted Duration Therapy Intervention (Days)  --  until discharge  -MM       SLP Diet Recommendation  --  regular textures;thin liquids  -MM       Recommended Diagnostics  --  SLE/Cog/Motor Speech Evaluation  -MM       Recommended Precautions and Strategies  --  upright posture during/after eating;small bites of food and sips of liquid;general aspiration precautions;fatigue precautions  -MM       Oral Care  Recommendations  --  Oral Care BID/PRN;Toothbrush  -MM       SLP Rec. for Method of Medication Administration  --  meds whole;as tolerated  -MM       Monitor for Signs of Aspiration  --  yes;notify SLP if any concerns  -MM       Anticipated Discharge Disposition (SLP)  --  skilled nursing facility  -MM          Swallow Goals (SLP)    Oral Nutrition/Hydration Goal Selection (SLP)  oral nutrition/hydration, SLP goal 1  -MM  oral nutrition/hydration, SLP goal 1  -MM          Oral Nutrition/Hydration Goal 1 (SLP)    Oral Nutrition/Hydration Goal 1, SLP  Patient will tolerate LRD without s/s of aspiration.  -MM  Patient will tolerate LRD without s/s of aspiration.  -MM       Time Frame (Oral Nutrition/Hydration Goal 1, SLP)  by discharge  -MM  by discharge  -MM       Barriers (Oral Nutrition/Hydration Goal 1, SLP)  none  -MM  none  -MM       Progress/Outcomes (Oral Nutrition/Hydration Goal 1, SLP)  continuing progress toward goal  -MM  goal ongoing  -MM         User Key  (r) = Recorded By, (t) = Taken By, (c) = Cosigned By    Initials Name Effective Dates    MM Maia Gonsalez MS CCC-SLP 07/12/20 -           EDUCATION  The patient has been educated in the following areas:   Dysphagia (Swallowing Impairment) Oral Care/Hydration.    SLP Recommendation and Plan                                         Daily Summary of Progress (SLP): progress toward functional goals as expected    Plan for Continued Treatment (SLP): Continue to follow               Plan of Care Reviewed With: patient, other (see comments) (JENNIFFER Best)  Progress: improving    SLP GOALS     Row Name 06/04/21 1033 06/03/21 0928          Oral Nutrition/Hydration Goal 1 (SLP)    Oral Nutrition/Hydration Goal 1, SLP  Patient will tolerate LRD without s/s of aspiration.  -MM  Patient will tolerate LRD without s/s of aspiration.  -MM     Time Frame (Oral Nutrition/Hydration Goal 1, SLP)  by discharge  -MM  by discharge  -MM     Barriers (Oral Nutrition/Hydration  Goal 1, SLP)  none  -MM  none  -MM     Progress/Outcomes (Oral Nutrition/Hydration Goal 1, SLP)  continuing progress toward goal  -MM  goal ongoing  -MM       User Key  (r) = Recorded By, (t) = Taken By, (c) = Cosigned By    Initials Name Provider Type    Maia Garcia MS CCC-SLP Speech and Language Pathologist             Time Calculation:   Time Calculation- SLP     Row Name 06/04/21 1349             Time Calculation- SLP    SLP Start Time  1033  -MM      SLP Stop Time  1050  -MM      SLP Time Calculation (min)  17 min  -MM      SLP Received On  06/04/21  -MM         Untimed Charges    59874-YR Treatment Swallow Minutes  17  -MM         Total Minutes    Untimed Charges Total Minutes  17  -MM       Total Minutes  17  -MM        User Key  (r) = Recorded By, (t) = Taken By, (c) = Cosigned By    Initials Name Provider Type    Maia Garcia MS CCC-SLP Speech and Language Pathologist          Therapy Charges for Today     Code Description Service Date Service Provider Modifiers Qty    38003097263 HC ST EVAL ORAL PHARYNG SWALLOW 4 6/3/2021 Maia Gonsalez MS CCC-SLP GN 1    [id number] HC ST TREATMENT SWALLOW 1 6/4/2021 Maia Gonsalez MS CCC-SLP GN 1               MS BERNY Liao  6/4/2021

## 2021-06-04 NOTE — PLAN OF CARE
Goal Outcome Evaluation:  Plan of Care Reviewed With: patient  Progress: no change  Outcome Summary: Pt transferred from ICU tonight. Alert, has been confused to place and situation. No c/o pain since coming to the floor. Drowsy but responds easily to voice. Tele. VSS. Safety maintained. Will continue to monitor.

## 2021-06-04 NOTE — THERAPY EVALUATION
Patient Name: Mini Gentile  : 1960    MRN: [id number]                              Today's Date: 2021       Admit Date: 2021    Visit Dx:     ICD-10-CM ICD-9-CM   1. Altered mental status, unspecified altered mental status type  R41.82 780.97   2. Hypoglycemia  E16.2 251.2   3. Aspiration pneumonia, unspecified aspiration pneumonia type, unspecified laterality, unspecified part of lung (CMS/[facility])  J69.0 507.0   4. Dysphagia, unspecified type  R13.10 787.20   5. Decreased activities of daily living (ADL)  Z78.9 V49.89   6. 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   • Eustachian tube dysfunction   • Chronic otitis media   • Pneumonia of left upper lobe due to Streptococcus (CMS/[facility])   • End stage renal failure on dialysis (CMS/[facility])   • Diabetes (CMS/[facility])   • Glaucoma   • HTN (hypertension)   • Proteinuria   • Type 2 diabetes mellitus, with long-term current use of insulin (CMS/[facility])   • Chronic anemia   • Acute pulmonary edema (CMS/[facility])   • Non-compliance with renal dialysis (CMS/[facility])   • Elevated troponin due to ESRD    • Hyperkalemia   • Encephalopathy, metabolic, due to uremia   • High anion gap metabolic acidosis due to uremia   • Respiratory distress   • Acute diastolic heart failure (CMS/[facility])   • Lymph node enlargement, left axillary measuring 1.1 cm -follow-up for primary care   • Nausea vomiting and diarrhea   • Acute diastolic heart failure (CMS/[facility])   • Diabetes mellitus with ophthalmic complication (CMS/[facility])   • Tobacco abuse   • Urinary tract infection with hematuria   • Pneumonia due to infectious organism   • Abnormal urine findings   • Closed fracture of right proximal humerus   • Hyponatremia   • Hypothyroidism (acquired)   • Forehead laceration secondary to fall   • Heme positive stool   • Dialysis AV fistula malfunction, initial encounter (CMS/[facility])   • Aspiration into airway   • Bronchospasm,  acute   • Altered mental status   • Protein-calorie malnutrition (CMS/[facility])   • Pupil irregular   • Sepsis (CMS/[facility])   • Hypoglycemia     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    • Chronic otitis media    • Diabetes (CMS/[facility])    • End stage renal disease on dialysis (CMS/[facility])    • Eustachian tube dysfunction    • GERD (gastroesophageal reflux disease)    • Glaucoma    • HTN (hypertension)    • Hyperlipidemia    • Mucoid otitis media    • Noncompliance of patient with renal dialysis (CMS/[facility])    • Tobacco abuse      Past Surgical History:   Procedure Laterality Date   • ARTERIOVENOUS FISTULA     • CATARACT EXTRACTION WITH INTRAOCULAR LENS IMPLANT     •  SECTION     • CHOLECYSTECTOMY     • MYRINGOTOMY W/ TUBES Bilateral    • MYRINGOTOMY W/ TUBES Right    • TUBAL ABDOMINAL LIGATION       General Information     Row Name 21 4959          Physical Therapy Time and Intention    Document Type  evaluation  -MS     Mode of Treatment  physical therapy;individual therapy  -MS     Row Name 21 4497          General Information    Patient Profile Reviewed  yes  -MS     Prior Level of Function  -- pt is poor historian, pt reports her SO assist her when needed  -MS     Existing Precautions/Restrictions  non-weight bearing;fall R UE NWB, sling  -MS     Barriers to Rehab  previous functional deficit;cognitive status;physical barrier  -MS     Row Name 21 0304          Living Environment    Lives With  significant other  -MS     Row Name 21 4151          Cognition    Orientation Status (Cognition)  oriented to;person;place;disoriented to;situation;time  -MS     Row Name 21 1320          Safety Issues, Functional Mobility    Safety Issues Affecting Function (Mobility)  ability to follow commands;insight into deficits/self-awareness;judgment;safety precaution awareness;safety precautions follow-through/compliance  -MS     Impairments Affecting  Function (Mobility)  balance;cognition;endurance/activity tolerance  -MS       User Key  (r) = Recorded By, (t) = Taken By, (c) = Cosigned By    Initials Name Provider Type    Maia Austin PAZ, PT, DPT, NCS Physical Therapist        Mobility     Row Name 06/04/21 1341          Bed Mobility    Supine-Sit [place] (Bed Mobility)  minimum assist (75% patient effort);verbal cues;nonverbal cues (demo/gesture)  -MS     Sit-Supine [place] (Bed Mobility)  minimum assist (75% patient effort);verbal cues;nonverbal cues (demo/gesture)  -MS     Assistive Device (Bed Mobility)  head of bed elevated;bed rails  -MS     Comment (Bed Mobility)  pt does not maintain NWB of R UE even with cues  -MS     Row Name 06/04/21 1341          Sit-Stand Transfer    Sit-Stand [place] (Transfers)  minimum assist (75% patient effort);verbal cues;nonverbal cues (demo/gesture)  -MS     Assistive Device (Sit-Stand Transfers)  -- HHA  -MS     Row Name 06/04/21 1341          Gait/Stairs (Locomotion)    [place] Level (Gait)  minimum assist (75% patient effort);verbal cues;nonverbal cues (demo/gesture)  -MS     Assistive Device (Gait)  -- HHA  -MS     Distance in Feet (Gait)  4 steps to the L toward HOB, pt unsteady and shakey  -MS     Row Name 06/04/21 1341          Mobility    Extremity Weight-bearing Status  right upper extremity  -MS     Right Upper Extremity (Weight-bearing Status)  non weight-bearing (NWB)  -MS       User Key  (r) = Recorded By, (t) = Taken By, (c) = Cosigned By    Initials Name Provider Type    MS Ovalle Maia MULLEN, PT, DPT, NCS Physical Therapist        Obj/Interventions     Row Name 06/04/21 1342          Range of Motion Comprehensive    Comment, General Range of Motion  L shoulder defered, all others WFL  -MS     Row Name 06/04/21 1342          Strength Comprehensive (MMT)    Comment, General Manual Muscle Testing (MMT) Assessment  L shoulder and elbow defered, all others 4/5  -MS     Row Name 06/04/21 1342           Balance    Balance Assessment  sitting static balance;sitting dynamic balance;standing static balance;standing dynamic balance  -MS     Static Sitting Balance  WFL;sitting, edge of bed  -MS     Dynamic Sitting Balance  WFL;sitting, edge of bed  -MS     Static Standing Balance  mild impairment;supported  -MS     Dynamic Standing Balance  mild impairment;supported  -MS     Row Name 06/04/21 1342          Sensory Assessment (Somatosensory)    Sensory Assessment (Somatosensory)  sensation intact  -MS       User Key  (r) = Recorded By, (t) = Taken By, (c) = Cosigned By    Initials Name Provider Type    MS Ovalle Maia R, PT, DPT, NCS Physical Therapist        Goals/Plan     Row Name 06/04/21 1345          Bed Mobility Goal 1 (PT)    Activity/Assistive Device (Bed Mobility Goal 1, PT)  bed mobility activities, all  -MS     [place] Level/Cues Needed (Bed Mobility Goal 1, PT)  modified independence  -MS     Time Frame (Bed Mobility Goal 1, PT)  long term goal (LTG);by discharge  -MS     Progress/Outcomes (Bed Mobility Goal 1, PT)  goal ongoing  -MS     Row Name 06/04/21 1345          Transfer Goal 1 (PT)    Activity/Assistive Device (Transfer Goal 1, PT)  sit-to-stand/stand-to-sit;bed-to-chair/chair-to-bed;walker, lenore  -MS     [place] Level/Cues Needed (Transfer Goal 1, PT)  contact guard assist;tactile cues required;verbal cues required  -MS     Time Frame (Transfer Goal 1, PT)  long term goal (LTG);by discharge  -MS     Progress/Outcome (Transfer Goal 1, PT)  goal ongoing  -MS     Row Name 06/04/21 1345          Gait Training Goal 1 (PT)    Activity/Assistive Device (Gait Training Goal 1, PT)  gait (walking locomotion);assistive device use;decrease fall risk;increase endurance/gait distance;improve balance and speed;walker, lenore  -MS     [place] Level (Gait Training Goal 1, PT)  contact guard assist;tactile cues required;verbal cues required  -MS     Distance (Gait Training Goal 1, PT)  50ft   "-MS     Time Frame (Gait Training Goal 1, PT)  long term goal (LTG);by discharge  -MS     Progress/Outcome (Gait Training Goal 1, PT)  goal ongoing  -MS       User Key  (r) = Recorded By, (t) = Taken By, (c) = Cosigned By    Initials Name Provider Type    MS Maia Ovalle R, PT, DPT, NCS Physical Therapist        Clinical Impression     Row Name 06/04/21 1328          Pain    Additional Documentation  Pain Scale: Numbers Pre/Post-Treatment (Group)  -MS     Row Name 06/04/21 1328          Pain Scale: Numbers Pre/Post-Treatment    Pretreatment Pain Rating  0/10 - no pain  -MS     Posttreatment Pain Rating  0/10 - no pain  -MS     Row Name 06/04/21 1328          Plan of Care Review    Plan of Care Reviewed With  patient  -MS     Progress  no change  -MS     Outcome Summary  PT evaluation completed. The patient presents alert and oriented to self and place only. She is does not remember or follow her NWB restrictions on the R UE. She frequently calls out for her SO \"Jose.\" She is generally weak and  unsafe to be up by herself. She may forget she isn't allow up or use her R UE. The patient will benefit from skilled PT services to work on strengthening, balance, and gait stability. Recommend discharge to SNF.  -MS     Row Name 06/04/21 5779          Therapy Assessment/Plan (PT)    Patient/Family Therapy Goals Statement (PT)  get out of bed  -MS     Rehab Potential (PT)  good, to achieve stated therapy goals  -MS     Criteria for Skilled Interventions Met (PT)  yes;meets criteria;skilled treatment is necessary  -MS     Predicted Duration of Therapy Intervention (PT)  until discharge  -MS     Row Name 06/04/21 3333          Positioning and Restraints    Post Treatment Position  bed  -MS     In Bed  fowlers;call light within reach;encouraged to call for assist;exit alarm on;side rails up x2;with brace;RUE elevated  -MS       User Key  (r) = Recorded By, (t) = Taken By, (c) = Cosigned By    Initials Name Provider Type    MS " Maia Ovalle, PT, DPT, NCS Physical Therapist        Outcome Measures     Row Name 06/04/21 1346          How much help from another person do you currently need...    Turning from your back to your side while in flat bed without using bedrails?  2  -MS     Moving from lying on back to sitting on the side of a flat bed without bedrails?  2  -MS     Moving to and from a bed to a chair (including a wheelchair)?  3  -MS     Standing up from a chair using your arms (e.g., wheelchair, bedside chair)?  3  -MS     Climbing 3-5 steps with a railing?  1  -MS     To walk in hospital room?  1  -MS     AM-PAC 6 Clicks Score (PT)  12  -MS     Row Name 06/04/21 1346          Functional Assessment    Outcome Measure Options  AM-PAC 6 Clicks Basic Mobility (PT)  -MS       User Key  (r) = Recorded By, (t) = Taken By, (c) = Cosigned By    Initials Name Provider Type    MS Maia Ovalle, PT, DPT, NCS Physical Therapist        Physical Therapy Education                 Title: PT OT SLP Therapies (In Progress)     Topic: Physical Therapy (In Progress)     Point: Mobility training (In Progress)     Learning Progress Summary           Patient Acceptance, E, NR by MS at 6/4/2021 1346    Comment: role of PT in her care                   Point: Home exercise program (Not Started)     Learner Progress:  Not documented in this visit.          Point: Body mechanics (Not Started)     Learner Progress:  Not documented in this visit.          Point: Precautions (Not Started)     Learner Progress:  Not documented in this visit.                      User Key     Initials Effective Dates Name Provider Type Discipline    MS 06/19/18 -  Maia Ovalle PT, DPT, NCS Physical Therapist PT              PT Recommendation and Plan  Planned Therapy Interventions (PT): balance training, bed mobility training, gait training, patient/family education, orthotic fitting/training, strengthening, transfer training  Plan of Care Reviewed With:  "patient  Progress: no change  Outcome Summary: PT evaluation completed. The patient presents alert and oriented to self and place only. She is does not remember or follow her NWB restrictions on the R UE. She frequently calls out for her SO \"Jose.\" She is generally weak and  unsafe to be up by herself. She may forget she isn't allow up or use her R UE. The patient will benefit from skilled PT services to work on strengthening, balance, and gait stability. Recommend discharge to SNF.     Time Calculation:   PT Charges     Row Name 06/04/21 1346             Time Calculation    Start Time  1308 5 min chart review  -MS      Stop Time  1400  -MS      Time Calculation (min)  52 min  -MS      PT Received On  06/04/21  -MS      PT Goal Re-Cert Due Date  06/14/21  -MS         Untimed Charges    PT Eval/Re-eval Minutes  57  -MS         Total Minutes    Untimed Charges Total Minutes  57  -MS       Total Minutes  57  -MS        User Key  (r) = Recorded By, (t) = Taken By, (c) = Cosigned By    Initials Name Provider Type    MS Maia Ovalle, PT, DPT, NCS Physical Therapist        Therapy Charges for Today     Code Description Service Date Service Provider Modifiers Qty    45423666263 HC PT EVAL LOW COMPLEXITY 4 6/4/2021 Maia Ovalle PT, DPT, NCS GP 1          PT G-Codes  Outcome Measure Options: AM-PAC 6 Clicks Basic Mobility (PT)  AM-PAC 6 Clicks Score (PT): 12  AM-PAC 6 Clicks Score (OT): 14    Maia Ovalle PT, DPT, MELBA  6/4/2021    "

## 2021-06-04 NOTE — CASE MANAGEMENT/SOCIAL WORK
Continued Stay Note   [place]     Patient Name: Mini Gentile  MRN: [id number]  Today's Date: 6/4/2021    Admit Date: 6/1/2021    Discharge Plan     Row Name 06/04/21 1451       Plan    Plan  [facility]- Ins. precert being started    Patient/Family in Agreement with Plan  yes    Plan Comments  Bed offers from both [facility] and [facility], s/o- Jose [phone] prefers [facility]. Will inform upon ins. precert approval. [phone]        Discharge Codes    No documentation.             SACHI Douglas

## 2021-06-05 NOTE — THERAPY TREATMENT NOTE
Acute Care - Physical Therapy Treatment Note  [facility]     Patient Name: Mini Gentile  : 1960  MRN: [id number]  Today's Date: 2021           PT Assessment (last 12 hours)      PT Evaluation and Treatment     Row Name 21          Physical Therapy Time and Intention    Subjective Information  no complaints  -NAOMI     Document Type  therapy note (daily note)  -NAOMI     Mode of Treatment  physical therapy  -NAOMI     Row Name 21          General Information    Existing Precautions/Restrictions  fall;non-weight bearing;right NWB RUE with sling  -NAOMI     Barriers to Rehab  (S) visual deficit  -NAOMI     Row Name 21          Pain Scale: Numbers Pre/Post-Treatment    Pretreatment Pain Rating  0/10 - no pain  -NAOMI     Posttreatment Pain Rating  0/10 - no pain  -NAOMI     Row Name 21          Bed Mobility    Supine-Sit [place] (Bed Mobility)  verbal cues;contact guard  -     Sit-Supine [place] (Bed Mobility)  -- sitting EOB   -NAOMI     Row Name 21          Transfers    Sit-Stand [place] (Transfers)  verbal cues;minimum assist (75% patient effort)  -NAOMI     Row Name 21          Gait/Stairs (Locomotion)    [place] Level (Gait)  verbal cues;contact guard;minimum assist (75% patient effort)  -     Assistive Device (Gait)  -- HHA   -NAOMI     Distance in Feet (Gait)  30  -NAOMI     Comment (Gait/Stairs)  may be able to use straight cane..  Pt uses a RWX at home   -NAOMI     Row Name             Wound 21 coccyx    Wound - Properties Group Placement Date: 21  -VT Placement Time: 142 Location: coccyx  -VT    Retired Wound - Properties Group Date first assessed: 21  -VT Time first assessed: 142 Location: coccyx  -VT    Row Name 21          Positioning and Restraints    Pre-Treatment Position  in bed  -NAOMI     Post Treatment Position  bed  -NAOMI     In Bed  sitting EOB;call light within reach;encouraged to call  for assist;exit alarm on;side rails up x2  -NAOMI       User Key  (r) = Recorded By, (t) = Taken By, (c) = Cosigned By    Initials Name Provider Type    NAOMI Domingo Dill PTA Physical Therapy Assistant    Alyx Kumar, RN Registered Nurse        Physical Therapy Education                 Title: PT OT SLP Therapies (In Progress)     Topic: Physical Therapy (In Progress)     Point: Mobility training (Done)     Learning Progress Summary           Patient Acceptance, E, VU by NAOMI at 6/5/2021 0823    Comment: benefits of activity, NWB RUE, safety    Acceptance, E, NR by MS at 6/4/2021 1346    Comment: role of PT in her care                   Point: Home exercise program (Not Started)     Learner Progress:  Not documented in this visit.          Point: Body mechanics (Not Started)     Learner Progress:  Not documented in this visit.          Point: Precautions (Not Started)     Learner Progress:  Not documented in this visit.                      User Key     Initials Effective Dates Name Provider Type Discipline    MS 06/19/18 -  Maia Ovalle, PT, DPT, NCS Physical Therapist PT     12/08/16 -  Domingo Dill PTA Physical Therapy Assistant PT              PT Recommendation and Plan     Plan of Care Reviewed With: patient  Progress: improving  Outcome Summary: Pt was able to transfer supine to sit with min assist.  Transfered sit to stand with CGA/min assist.  Amb 30' with HHA, CGA/min assist.  Cues for NWB with RUE.  Will continue to work with pt to increase strength, improve balance and safety with mobility.       Time Calculation:   PT Charges     Row Name 06/05/21 0823             Time Calculation    Start Time  0823  -NAOMI      Stop Time  0851  -NAOMI      Time Calculation (min)  28 min  -NAOMI      PT Received On  06/05/21  -         Time Calculation- PT    Total Timed Code Minutes- PT  28 minute(s)  -NAOMI         Timed Charges    78086 - Gait Training Minutes   28  -NAOMI         Total Minutes    Timed  Charges Total Minutes  28  -NAOMI       Total Minutes  28  -NAOMI        User Key  (r) = Recorded By, (t) = Taken By, (c) = Cosigned By    Initials Name Provider Type    Domingo Whitaker PTA Physical Therapy Assistant        Therapy Charges for Today     Code Description Service Date Service Provider Modifiers Qty    73036544182 HC GAIT TRAINING EA 15 MIN 6/5/2021 Domingo Dill PTA GP 2          PT G-Codes  Outcome Measure Options: AM-PAC 6 Clicks Basic Mobility (PT)  AM-PAC 6 Clicks Score (PT): 12  AM-PAC 6 Clicks Score (OT): 14    Domingo Dill PTA  6/5/2021

## 2021-06-05 NOTE — PROGRESS NOTES
[facility] Medicine Services  INPATIENT PROGRESS NOTE    Patient Name: Mini Gentile  Date of Admission: 6/1/2021  Today's Date: 06/05/21  Length of Stay: 4  Primary Care Physician: Bharati Dahl APRN    Subjective   Chief Complaint: Altered mental status    HPI:  Pt with some diarrhea today, no abd pain or cramping. No other real changes from yesterday. Denies sob. No chest pain. Continues to feel weak.       Review of Systems   All pertinent negatives and positives are as above. All other systems have been reviewed and are negative unless otherwise stated.     Objective    Temp:  [97.5 °F (36.4 °C)-98.5 °F (36.9 °C)] 97.5 °F (36.4 °C)  Heart Rate:  [74-85] 80  Resp:  [18-20] 20  BP: ()/(43-83) 128/68  Physical Exam  GEN: Awake and interactive, following commands, no acute distress  HEENT:  PERRLA, Anicteric, Trachea midline  Lungs: coarse bs, no wheezing  Heart: RRR, +S1/s2, no rub  ABD: soft, nt/nd, +BS, no guarding/rebound  Extremities:  no cyanosis, no edema  Skin: no rashes or petechiae   Neuro: AAOx2, MONSALVE, good  b/l, no obvious focal deficits  Psych: flat        Results Review:  I have reviewed the labs, radiology results, and diagnostic studies.    Laboratory Data:   Results from last 7 days   Lab Units 06/04/21 0449 06/03/21 0239 06/02/21 0318   WBC 10*3/mm3 2.79* 3.04* 2.93*   HEMOGLOBIN g/dL 10.2* 9.5* 9.1*   HEMATOCRIT % 31.2* 30.4* 28.3*   PLATELETS 10*3/mm3 146 174 175        Results from last 7 days   Lab Units 06/05/21  0551 06/04/21 0449 06/03/21  0239 06/02/21 0318 06/01/21  1910 06/01/21  1759   SODIUM mmol/L 135* 132* 132* 131* 131* 133*   POTASSIUM mmol/L 4.0 4.3 3.9 3.6 3.6 3.7   CHLORIDE mmol/L 95* 97* 93* 92* 89* 91*   CO2 mmol/L 24.0 19.0* 26.0 29.0 26.0 28.0   BUN mg/dL 21 15 28* 25* 24* 23   CREATININE mg/dL 3.74* 2.98* 4.38* 3.90* 3.42* 3.30*   CALCIUM mg/dL 9.7 9.0 8.9 8.3* 9.1 9.1   BILIRUBIN mg/dL  --   --   --  0.3 0.4 0.4      ALK PHOS U/L  --   --   --  206* 260* 263*   ALT (SGPT) U/L  --   --   --  14 17 17   AST (SGOT) U/L  --   --   --  17 21 20   GLUCOSE mg/dL 141* 104* 119* 200* 144* 126*       Culture Data:   No results found for: BLOODCX, URINECX, WOUNDCX, MRSACX, RESPCX, STOOLCX    Radiology Data:   Imaging Results (Last 24 Hours)     ** No results found for the last 24 hours. **          I have reviewed the patient's current medications.     Assessment/Plan     Active Hospital Problems    Diagnosis    • **Altered mental status    • Hypoglycemia    • Aspiration into airway    • End stage renal failure on dialysis (CMS/[facility])    • Diabetes (CMS/[facility])    • HTN (hypertension)        #1 altered mental status/encephalopathy -unclear etiology.  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 She was also hypoglycemic on arrival.  Some reports that she responded to Narcan.  She has evidence for aspiration.  Currently being covered with Zosyn.  Head CT was nonacute on arrival.  Her mental status continues to improve with supportive care and treatment.  Continue to monitor.  Intact neuro exam. Ok to d/c vanco.     #2 aspiration -patient was recently hospitalized for aspiration pneumonia after a procedure when she vomited.  CAT scan on arrival here does show some debris in her bronchus.  Currently altered.  Keep NPO.  Will need a speech evaluation when she wakes up.  Covering with Zosyn.  #3 ESRD - nephrology following for dialysis needs    #4 DM 2 -continue sliding scale insulin and hypoglycemia protocol    #5 essential hypertension -blood pressure more stable today despite none of her home BP meds.  Continue monitor.    #6 right humeral fracture -this occurred all the way back in March.  She was told to follow-up with orthopedics after discharge.  Unclear if she has seen them or not.      #7 diarrhea - no abd pain, no wbc elevation, no fever. Will give lomotil x1 and  monitor. Will not make prn yet at this time.     Patient previously was recommended for SNF but declined.   has called and states she is too weak and debilitated he cannot take care of at home.  We will plan for SNF placement at discharge.      Discharge Planning: I expect the patient to be discharged to SNF when medically ready.  Precert pending    Electronically signed by Bin Guy DO, 06/05/21, 10:59 CDT.

## 2021-06-05 NOTE — THERAPY TREATMENT NOTE
Acute Care - Occupational Therapy Treatment Note  [facility]     Patient Name: Mini Gentile  : 1960  MRN: [id number]  Today's Date: 2021             Admit Date: 2021       ICD-10-CM ICD-9-CM   1. Altered mental status, unspecified altered mental status type  R41.82 780.97   2. Hypoglycemia  E16.2 251.2   3. Aspiration pneumonia, unspecified aspiration pneumonia type, unspecified laterality, unspecified part of lung (CMS/[facility])  J69.0 507.0   4. Dysphagia, unspecified type  R13.10 787.20   5. Decreased activities of daily living (ADL)  Z78.9 V49.89   6. 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   • Eustachian tube dysfunction   • Chronic otitis media   • Pneumonia of left upper lobe due to Streptococcus (CMS/[facility])   • End stage renal failure on dialysis (CMS/[facility])   • Diabetes (CMS/[facility])   • Glaucoma   • HTN (hypertension)   • Proteinuria   • Type 2 diabetes mellitus, with long-term current use of insulin (CMS/[facility])   • Chronic anemia   • Acute pulmonary edema (CMS/[facility])   • Non-compliance with renal dialysis (CMS/[facility])   • Elevated troponin due to ESRD    • Hyperkalemia   • Encephalopathy, metabolic, due to uremia   • High anion gap metabolic acidosis due to uremia   • Respiratory distress   • Acute diastolic heart failure (CMS/[facility])   • Lymph node enlargement, left axillary measuring 1.1 cm -follow-up for primary care   • Nausea vomiting and diarrhea   • Acute diastolic heart failure (CMS/[facility])   • Diabetes mellitus with ophthalmic complication (CMS/[facility])   • Tobacco abuse   • Urinary tract infection with hematuria   • Pneumonia due to infectious organism   • Abnormal urine findings   • Closed fracture of right proximal humerus   • Hyponatremia   • Hypothyroidism (acquired)   • Forehead laceration secondary to fall   • Heme positive stool   • Dialysis AV fistula malfunction, initial encounter (CMS/[facility])   • Aspiration  into airway   • Bronchospasm, acute   • Altered mental status   • Protein-calorie malnutrition (CMS/[facility])   • Pupil irregular   • Sepsis (CMS/HCC)   • Hypoglycemia     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    • Chronic otitis media    • Diabetes (CMS/HCC)    • End stage renal disease on dialysis (CMS/[facility])    • Eustachian tube dysfunction    • GERD (gastroesophageal reflux disease)    • Glaucoma    • HTN (hypertension)    • Hyperlipidemia    • Mucoid otitis media    • Noncompliance of patient with renal dialysis (CMS/[facility])    • Tobacco abuse      Past Surgical History:   Procedure Laterality Date   • ARTERIOVENOUS FISTULA     • CATARACT EXTRACTION WITH INTRAOCULAR LENS IMPLANT     •  SECTION     • CHOLECYSTECTOMY     • MYRINGOTOMY W/ TUBES Bilateral    • MYRINGOTOMY W/ TUBES Right    • TUBAL ABDOMINAL LIGATION              OT ASSESSMENT FLOWSHEET (last 12 hours)      OT Evaluation and Treatment     Row Name 21 1436                   OT Time and Intention    Subjective Information  no complaints  -TS        Document Type  therapy note (daily note)  -TS        Mode of Treatment  occupational therapy  -TS        Patient Effort  good  -TS        Comment  NWB/sling to RUE  -TS           General Information    Existing Precautions/Restrictions  fall;non-weight bearing;right  -TS        Barriers to Rehab  visual deficit  -TS           Pain Scale: Numbers Pre/Post-Treatment    Pretreatment Pain Rating  0/10 - no pain  -TS        Posttreatment Pain Rating  0/10 - no pain  -TS           Bed Mobility    Supine-Sit [place] (Bed Mobility)  contact guard;minimum assist (75% patient effort)  -TS        Assistive Device (Bed Mobility)  draw sheet;bed rails;head of bed elevated  -TS           Transfer Assessment/Treatment    Transfers  sit-stand transfer;stand-sit transfer;bed-chair transfer  -TS           Transfers    Bed-Chair [place] (Transfers)  contact  guard  -TS        Sit-Stand [place] (Transfers)  contact guard;minimum assist (75% patient effort)  -TS        Stand-Sit [place] (Transfers)  contact guard  -TS           Activities of Daily Living    BADL Assessment/Intervention  feeding  -TS           Self-Feeding Assessment/Training    Victor Level (Feeding)  liquids to mouth;feeding skills;set up  -TS        Position (Self-Feeding)  supported sitting  -TS           Wound 04/04/21 0142 coccyx    Wound - Properties Group Placement Date: 04/04/21  -VT Placement Time: 0142 -VT Location: coccyx  -VT    Retired Wound - Properties Group Date first assessed: 04/04/21  -VT Time first assessed: 0142 -VT Location: coccyx  -VT       Positioning and Restraints    Pre-Treatment Position  in bed  -TS        Post Treatment Position  chair  -TS        In Chair  sitting;call light within reach;encouraged to call for assist;exit alarm on  -TS          User Key  (r) = Recorded By, (t) = Taken By, (c) = Cosigned By    Initials Name Effective Dates    TS Bijal Lindquist COTA/L 08/02/16 -     VT Alyx Watson RN 08/02/16 -          Occupational Therapy Education                 Title: PT OT SLP Therapies (In Progress)     Topic: Occupational Therapy (In Progress)     Point: ADL training (Done)     Description:   Instruct learner(s) on proper safety adaptation and remediation techniques during self care or transfers.   Instruct in proper use of assistive devices.              Learning Progress Summary           Patient Acceptance, E, VU by TSERING at 6/3/2021 0460                   Point: Home exercise program (Not Started)     Description:   Instruct learner(s) on appropriate technique for monitoring, assisting and/or progressing therapeutic exercises/activities.              Learner Progress:  Not documented in this visit.          Point: Precautions (Done)     Description:   Instruct learner(s) on prescribed precautions during self-care and functional  transfers.              Learning Progress Summary           Patient Acceptance, E, VU by MICHAEL at 6/3/2021 1248                   Point: Body mechanics (Done)     Description:   Instruct learner(s) on proper positioning and spine alignment during self-care, functional mobility activities and/or exercises.              Learning Progress Summary           Patient Acceptance, E, VU by MICHAEL at 6/3/2021 1248                               User Key     Initials Effective Dates Name Provider Type Discipline    MICHAEL 12/04/20 -  Kami Chapman OTR/L Occupational Therapist OT                  OT Recommendation and Plan          Outcome Measures     Row Name 06/05/21 1400             How much help from another is currently needed...    Putting on and taking off regular lower body clothing?  2  -TS      Bathing (including washing, rinsing, and drying)  3  -TS      Toileting (which includes using toilet bed pan or urinal)  3  -TS      Putting on and taking off regular upper body clothing  3  -TS      Taking care of personal grooming (such as brushing teeth)  3  -TS      Eating meals  4  -TS      AM-PAC 6 Clicks Score (OT)  18  -TS        User Key  (r) = Recorded By, (t) = Taken By, (c) = Cosigned By    Initials Name Provider Type    TS Bijal Lindquist, SEFERINO/L Occupational Therapy Assistant          Time Calculation:   Time Calculation- OT     Row Name 06/05/21 1457 06/05/21 0823          Time Calculation- OT    OT Start Time  1435  -TS  --     OT Stop Time  1458  -TS  --     OT Time Calculation (min)  23 min  -TS  --     Total Timed Code Minutes- OT  23 minute(s)  -TS  --     OT Received On  06/05/21  -TS  --        Timed Charges    62091 - Gait Training Minutes   --  28  -NAOMI     32591 - OT Self Care/Mgmt Minutes  23  -TS  --        Total Minutes    Timed Charges Total Minutes  23  -TS  28  -NAOMI      Total Minutes  23  -TS  28  -NAOMI       User Key  (r) = Recorded By, (t) = Taken By, (c) = Cosigned By    Initials Name Provider  Type    TS Bijal Lindquist GENTILE/L Occupational Therapy Assistant    Domingo Whitaker, PTA Physical Therapy Assistant        Therapy Charges for Today     Code Description Service Date Service Provider Modifiers Qty    [id number] HC OT SELF CARE/MGMT/TRAIN EA 15 MIN 6/5/2021 Bijal Lindquist COTA/L GO 2               Bijal SHEN. SEFERINO Lindquist/GABY  6/5/2021

## 2021-06-05 NOTE — PROGRESS NOTES
Nephrology ([facility]) Progress Note      Patient:  Mini Gentile  YOB: 1960  Date of Service: 6/5/2021  MRN: [id number]   Acct: [id number]   Primary Care Physician: Bharati Dahl APRN  Advance Directive:   Code Status and Medical Interventions:   Ordered at: 06/02/21 0310     Code Status:    CPR     Medical Interventions (Level of Support Prior to Arrest):    Full     Admit Date: 6/1/2021       Hospital Day: 4  Referring Provider: Tejinder Kent MD      Patient personally seen and examined.  Complete chart including Consults, Notes, Operative Reports, Labs, Cardiology, and Radiology studies reviewed as able.    Chief complaint: End-stage renal disease/altered mental status..    Subjective:  Mini Gentile is a 61 y.o. female  whom we were consulted for end stage renal disease. Maintenance hemodialysis on Tuesday Thursday Saturday at [facility]. Fairly noncompliant with dialysis treatments but did attend on 6/01.  Discharged two weeks ago from [facility] [place] following fistulogram/venoplasty of left extremity on 5/13.  Presented this time with altered mental status. She was hypoglycemic, treated with IV dextrose, and was also given Narcan.  Has been tolerating HD treatments well.    Currently seen on hemodialysis  Hemodialysis access: AV fistula  Hemodialysis: 3-1/2-hour  Ultrafiltration: 3000 cc  2K bath  Blood flow rate is 400 cc/min    Allergies:  Bupropion, Chantix [varenicline], Gabapentin, Azithromycin, Levofloxacin, Penicillins, and Sulfa antibiotics    Home Meds:  Medications Prior to Admission   Medication Sig Dispense Refill Last Dose   • albuterol sulfate  (90 Base) MCG/ACT inhaler Inhale 2 puffs Every 4 (Four) Hours As Needed for Wheezing. (Patient taking differently: Inhale 2 puffs Every 6 (Six) Hours As Needed for Wheezing.) 18 g 0 Past Month at Unknown time   • aspirin 81 MG EC tablet Take 81 mg by mouth Daily.   6/1/2021 at Unknown  time   • atropine 1 % ophthalmic solution Administer 1 drop into the left eye Daily.   6/1/2021 at Unknown time   • brimonidine (ALPHAGAN) 0.2 % ophthalmic solution Administer 1 drop into the left eye 2 (Two) Times a Day As Needed.   6/1/2021 at Unknown time   • carvedilol (COREG) 6.25 MG tablet Take 6.25 mg by mouth 2 (Two) Times a Day With Meals.   6/1/2021 at Unknown time   • cholecalciferol (VITAMIN D3) 1000 units tablet Take 2,000 Units by mouth Daily.   6/1/2021 at Unknown time   • dorzolamide (TRUSOPT) 2 % ophthalmic solution Administer 1 drop into the left eye 2 (Two) Times a Day.   6/1/2021 at Unknown time   • fluticasone (FLONASE) 50 MCG/ACT nasal spray 2 sprays into the nostril(s) as directed by provider 2 (Two) Times a Day.   Past Week at Unknown time   • insulin glargine (LANTUS) 100 UNIT/ML injection Inject 10 Units under the skin Every Night.   6/1/2021 at Unknown time   • ipratropium-albuterol (DUO-NEB) 0.5-2.5 mg/3 ml nebulizer Take 3 mL by nebulization Every 4 (Four) Hours As Needed for Wheezing or Shortness of Air.   Past Month at Unknown time   • losartan (COZAAR) 50 MG tablet Take 50 mg by mouth Daily. Pt was decreased to one daily.   6/1/2021 at Unknown time   • oxyCODONE-acetaminophen (PERCOCET) 7.5-325 MG per tablet Take 1 tablet by mouth Every 8 (Eight) Hours As Needed. Pt has one pill left. Last filled 5/21/21 6/1/2021 at Unknown time   • pantoprazole (PROTONIX) 40 MG EC tablet Take 1 tablet by mouth Daily. 30 tablet 1 6/1/2021 at Unknown time   • pregabalin (LYRICA) 50 MG capsule Take 50 mg by mouth 2 (Two) Times a Day.   6/1/2021 at Unknown time   • traMADol (ULTRAM) 50 MG tablet Take 50 mg by mouth Every 6 (Six) Hours As Needed for Moderate Pain . Pt takes one daily.   6/1/2021 at Unknown time   • Travoprost (TRAVATAN OP) Apply  to eye(s) as directed by provider.   5/31/2021   • pravastatin (PRAVACHOL) 10 MG tablet Take 10 mg by mouth Every Night.   5/31/2021   • Sucroferric  Oxyhydroxide (Velphoro) 500 MG chewable tablet Chew 1 tablet. Takes with Dialysis   Unknown at Unknown time       Medicines:  Current Facility-Administered Medications   Medication Dose Route Frequency Provider Last Rate Last Admin   • acetaminophen (TYLENOL) tablet 650 mg  650 mg Oral Q4H PRN Bin Guy DO        Or   • acetaminophen (TYLENOL) suppository 650 mg  650 mg Rectal Q4H PRN Bin Guy DO       • aspirin EC tablet 81 mg  81 mg Oral Daily Bin Guy DO   81 mg at 06/05/21 1406   • atropine 1 % ophthalmic solution 1 drop  1 drop Left Eye Daily Bin Guy DO   1 drop at 06/05/21 1405   • brimonidine (ALPHAGAN) 0.2 % ophthalmic solution 1 drop  1 drop Left Eye TID Bin Guy DO   1 drop at 06/05/21 1517   • cholecalciferol (VITAMIN D3) tablet 2,000 Units  2,000 Units Oral Daily Bin Guy DO   2,000 Units at 06/05/21 1406   • dextrose (D50W) 25 g/ 50mL Intravenous Solution 25 g  25 g Intravenous Q15 Min PRN Bin Guy DO       • dextrose (GLUTOSE) oral gel 15 g  15 g Oral Q15 Min PRN Bin Guy DO       • dorzolamide (TRUSOPT) 2 % ophthalmic solution 1 drop  1 drop Left Eye BID Bin Guy DO   1 drop at 06/05/21 1404   • enoxaparin (LOVENOX) syringe 30 mg  30 mg Subcutaneous Q24H Bin Guy DO   30 mg at 06/05/21 1404   • epoetin vika-epbx (RETACRIT) injection 10,000 Units  10,000 Units Subcutaneous Once per day on Mon Wed Fri Jose Caballero MD   10,000 Units at 06/04/21 1836   • famotidine (PEPCID) tablet 20 mg  20 mg Oral Daily Bin Guy DO   20 mg at 06/05/21 1405   • Ferric Citrate tablet 840 mg  4 tablet Oral TID With Meals Bin Guy DO   840 mg at 06/05/21 1405   • fluticasone (FLONASE) 50 MCG/ACT nasal spray 2 spray  2 spray Nasal BID Bin Guy, DO   2 spray at 06/05/21 1405   • glucagon (human recombinant) (GLUCAGEN DIAGNOSTIC) injection 1 mg  1 mg Subcutaneous Q15 Min PRN Bin Guy,        • insulin  lispro (humaLOG) injection 2-7 Units  2-7 Units Subcutaneous TID AC Bin Guy DO   3 Units at 06/04/21 1246   • ipratropium-albuterol (DUO-NEB) nebulizer solution 3 mL  3 mL Nebulization Q4H PRN Bin Guy DO       • lactobacillus acidophilus (RISAQUAD) capsule 1 capsule  1 capsule Oral Daily Bin Guy DO   1 capsule at 06/05/21 1406   • metoclopramide (REGLAN) tablet 5 mg  5 mg Oral 4x Daily AC & at Bedtime Bin Guy DO   5 mg at 06/05/21 1407   • ondansetron (ZOFRAN) tablet 4 mg  4 mg Oral Q6H PRN Bin Guy DO       • Pharmacy to Dose Zosyn   Does not apply Continuous PRN Bin Guy DO       • piperacillin-tazobactam (ZOSYN) 3.375 g in iso-osmotic dextrose 50 ml (premix)  3.375 g Intravenous Q12H Bin Guy DO   3.375 g at 06/05/21 1404   • pravastatin (PRAVACHOL) tablet 10 mg  10 mg Oral Nightly Bin Guy DO   10 mg at 06/04/21 2105   • sertraline (ZOLOFT) tablet 25 mg  25 mg Oral Daily Bin Guy DO   25 mg at 06/05/21 1406   • sodium chloride 0.9 % bolus 100 mL  100 mL Intravenous PRN Jose Caballero MD       • sodium chloride 0.9 % flush 10 mL  10 mL Intravenous Q12H Bin Guy DO   10 mL at 06/05/21 1517   • sodium chloride 0.9 % flush 10 mL  10 mL Intravenous PRN Bin Guy DO       • timolol (TIMOPTIC) 0.5 % ophthalmic solution 1 drop  1 drop Left Eye Daily Bin Guy DO   1 drop at 06/05/21 1406       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    • Chronic otitis media    • Diabetes (CMS/[facility])    • End stage renal disease on dialysis (CMS/[facility])    • Eustachian tube dysfunction    • GERD (gastroesophageal reflux disease)    • Glaucoma    • HTN (hypertension)    • Hyperlipidemia    • Mucoid otitis media    • Noncompliance of patient with renal dialysis (CMS/[facility])    • Tobacco abuse        Past Surgical History:  Past Surgical History:   Procedure  Laterality Date   • ARTERIOVENOUS FISTULA     • CATARACT EXTRACTION WITH INTRAOCULAR LENS IMPLANT     •  SECTION     • CHOLECYSTECTOMY     • MYRINGOTOMY W/ TUBES Bilateral    • MYRINGOTOMY W/ TUBES Right    • TUBAL ABDOMINAL LIGATION         Family History  Family History   Problem Relation Age of Onset   • Heart disease Mother    • Diabetes Father    • Colon cancer Neg Hx    • Colon polyps Neg Hx        Social History  Social History     Socioeconomic History   • Marital status: Single     Spouse name: Not on file   • Number of children: Not on file   • Years of education: Not on file   • Highest education level: Not on file   Tobacco Use   • Smoking status: Current Every Day Smoker     Packs/day: 2.00     Years: 6.00     Pack years: 12.00     Types: Cigarettes   • Smokeless tobacco: Never Used   Vaping Use   • Vaping Use: Never used   Substance and Sexual Activity   • Alcohol use: No   • Drug use: No   • Sexual activity: Defer         Review of Systems:  Unable to obtain due to altered mentation    Objective:  Patient Vitals for the past 24 hrs:   BP Temp Temp src Pulse Resp SpO2 Weight   21 1437 126/94 97.5 °F (36.4 °C) Oral 88 18 93 % --   21 0805 128/68 97.5 °F (36.4 °C) Axillary 80 20 98 % --   21 0544 -- -- -- -- -- -- 71.3 kg (157 lb 1.6 oz)   21 0407 109/48 98.3 °F (36.8 °C) Axillary 74 20 97 % --   21 0004 119/43 98.3 °F (36.8 °C) Axillary 80 18 96 % --   21 1936 123/83 98.1 °F (36.7 °C) Oral 77 18 95 % 71.3 kg (157 lb 3.2 oz)       Intake/Output Summary (Last 24 hours) at 2021 1535  Last data filed at 2021 0730  Gross per 24 hour   Intake 120 ml   Output --   Net 120 ml     General: awake, oriented X 1   Neck: supple, no JVD  Chest:  clear to auscultation bilaterally without respiratory distress  CVS: regular rate and rhythm  Abdominal: soft, nontender, positive bowel sounds  Extremities: no cyanosis or edema  Skin: warm and dry without rash  Neuro: no  focal motor deficits    Labs:  Results from last 7 days   Lab Units 06/04/21  0449 06/03/21 0239 06/02/21 0318   WBC 10*3/mm3 2.79* 3.04* 2.93*   HEMOGLOBIN g/dL 10.2* 9.5* 9.1*   HEMATOCRIT % 31.2* 30.4* 28.3*   PLATELETS 10*3/mm3 146 174 175         Results from last 7 days   Lab Units 06/05/21  0551 06/04/21 0449 06/03/21 0239 06/02/21 0318 06/01/21  1910 06/01/21  1759   SODIUM mmol/L 135* 132* 132* 131* 131* 133*   POTASSIUM mmol/L 4.0 4.3 3.9 3.6 3.6 3.7   CHLORIDE mmol/L 95* 97* 93* 92* 89* 91*   CO2 mmol/L 24.0 19.0* 26.0 29.0 26.0 28.0   BUN mg/dL 21 15 28* 25* 24* 23   CREATININE mg/dL 3.74* 2.98* 4.38* 3.90* 3.42* 3.30*   CALCIUM mg/dL 9.7 9.0 8.9 8.3* 9.1 9.1   BILIRUBIN mg/dL  --   --   --  0.3 0.4 0.4   ALK PHOS U/L  --   --   --  206* 260* 263*   ALT (SGPT) U/L  --   --   --  14 17 17   AST (SGOT) U/L  --   --   --  17 21 20   GLUCOSE mg/dL 141* 104* 119* 200* 144* 126*       Radiology:   Imaging Results (Last 72 Hours)     ** No results found for the last 72 hours. **          Culture:  Blood Culture   Date Value Ref Range Status   06/01/2021 No growth at 24 hours  Preliminary   06/01/2021 Abnormal Stain (C)  Preliminary         Assessment   1.  End stage renal disease/seen on hemodialysis.  2.  Metabolic encephalopathy  3.  Sepsis  4.  Type 2 diabetes with hypoglycemia  5.  Hypertension  6.  Anemia of CKD  7.  COPD  7.  Hyponatremia     Plan:  1.  Tolerating very well.  2.  Monitor labs      Jose Caballero MD  6/5/2021  15:35 CDT

## 2021-06-05 NOTE — PLAN OF CARE
Goal Outcome Evaluation:  Plan of Care Reviewed With: patient  Progress: improving  Outcome Summary: Pt was able to transfer supine to sit with min assist.  Transfered sit to stand with CGA/min assist.  Amb 30' with HHA, CGA/min assist.  Cues for NWB with RUE.  Will continue to work with pt to increase strength, improve balance and safety with mobility.

## 2021-06-05 NOTE — PLAN OF CARE
Goal Outcome Evaluation:        Outcome Summary: Pt alert and oriented (confused at times).  Complaint of mild shoulder pain, sling in place, no pain medication requested.  BM today.  IV antibiotics given per order.  IJ dressing intake, caps in place.  Safety maintained.

## 2021-06-05 NOTE — PLAN OF CARE
Goal Outcome Evaluation:  Plan of Care Reviewed With: patient  Progress: improving  Outcome Summary: Pt A&O to person and place, forgetful and confused at times. no urine tonight, 2 loose BM. IJ in place, capped. repositioned as needed, pt repositions self. VSS, safety maintained. will continue to monitor

## 2021-06-05 NOTE — PLAN OF CARE
Goal Outcome Evaluation:  Plan of Care Reviewed With: patient  Progress: no change  Outcome Summary: Pt received dialysis today. Nurse reported 3L off during dialysis. Pt had large incontinent BM today. Lomotil given for diarrhea per MD order. Pt confused to time/year. Vitals stable. right arm in sling for fx. Up in chair part of shift. eating and drinking well. RA without complaints of shortness of air. Safety maintained.

## 2021-06-06 NOTE — PLAN OF CARE
Goal Outcome Evaluation:     Progress: no change  Outcome Summary: patient is A&O x4, confused at times. VSS. Had episode of incontinence, with confusion. Patient able to orient well. Bed in lowest position, call bell within reach, bed alarm on, will continue to monitor..     Patient had a bowel movement in toilet with small amount of bright red blood, Dr. Guy notified, will continue to monitor for now    Bedside neuro report given to Henrique Masters RN. No changes noted

## 2021-06-06 NOTE — PLAN OF CARE
Goal Outcome Evaluation:  Plan of Care Reviewed With: patient  Progress: improving  Outcome Summary: Pt was up in the chair.  Able to transfer sit to stand with CGA, required cues to remind pt of NWB with RUE.  Amb 60' with straight cane with CGA/min assist.  Pt was forward flexed, decrease gait speed and required 2 standing rest breaks.  Will continue to work with pt to increase strength and balance with amb and improve gait with straight cane.

## 2021-06-06 NOTE — THERAPY TREATMENT NOTE
Acute Care - Physical Therapy Treatment Note  [facility]     Patient Name: Mini Gentile  : 1960  MRN: [id number]  Today's Date: 2021           PT Assessment (last 12 hours)      PT Evaluation and Treatment     Row Name 21          Physical Therapy Time and Intention    Subjective Information  no complaints  -NAOMI     Document Type  therapy note (daily note)  -NAOMI     Mode of Treatment  physical therapy  -NAOMI     Row Name 21          General Information    Existing Precautions/Restrictions  fall;non-weight bearing NWB RUE with sling   -NAOMI     Barriers to Rehab  (S) visual deficit  -NAOMI     Row Name 21          Pain Scale: Numbers Pre/Post-Treatment    Pretreatment Pain Rating  0/10 - no pain  -NAOMI     Posttreatment Pain Rating  0/10 - no pain  -NAOMI     Row Name 21          Bed Mobility    Comment (Bed Mobility)  in the chair   -NAOMI     Row Name 21          Transfers    Sit-Stand [place] (Transfers)  verbal cues;contact guard  -NAOMI     Stand-Sit [place] (Transfers)  verbal cues;contact guard  -NAOMI     Row Name 21 08          Gait/Stairs (Locomotion)    [place] Level (Gait)  verbal cues;contact guard;minimum assist (75% patient effort)  -NAOMI     Assistive Device (Gait)  cane, straight  -NAOMI     Distance in Feet (Gait)  60  -NAOMI     Deviations/Abnormal Patterns (Gait)  gait speed decreased;stride length decreased  -NAOMI     Bilateral Gait Deviations  forward flexed posture;heel strike decreased  -NAOMI     Row Name             Wound 21 coccyx    Wound - Properties Group Placement Date: 21  -VT Placement Time: 142 Location: coccyx  -VT    Retired Wound - Properties Group Date first assessed: 21  -VT Time first assessed: 142 Location: coccyx  -VT    Row Name 21 08          Positioning and Restraints    Pre-Treatment Position  sitting in chair/recliner  -NAOMI     Post Treatment Position  chair  -NAOMI     In  Chair  sitting;call light within reach;encouraged to call for assist;exit alarm on  -NAOMI       User Key  (r) = Recorded By, (t) = Taken By, (c) = Cosigned By    Initials Name Provider Type    NAOMI Domingo Dill PTA Physical Therapy Assistant    Alyx Kumar, RN Registered Nurse        Physical Therapy Education                 Title: PT OT SLP Therapies (In Progress)     Topic: Physical Therapy (In Progress)     Point: Mobility training (Done)     Learning Progress Summary           Patient Acceptance, E,D, VU,NR by NAOMI at 6/6/2021 0805    Comment: gait training with straight cane    Acceptance, E, VU by NAOMI at 6/5/2021 0823    Comment: benefits of activity, NWB RUE, safety    Acceptance, E, NR by MS at 6/4/2021 1346    Comment: role of PT in her care                   Point: Home exercise program (Not Started)     Learner Progress:  Not documented in this visit.          Point: Body mechanics (Not Started)     Learner Progress:  Not documented in this visit.          Point: Precautions (Not Started)     Learner Progress:  Not documented in this visit.                      User Key     Initials Effective Dates Name Provider Type Discipline    MS 06/19/18 -  Maia Ovalle, PT, DPT, NCS Physical Therapist PT     12/08/16 -  Domingo Dill PTA Physical Therapy Assistant PT              PT Recommendation and Plan     Plan of Care Reviewed With: patient  Progress: improving  Outcome Summary: Pt was up in the chair.  Able to transfer sit to stand with CGA, required cues to remind pt of NWB with RUE.  Amb 60' with straight cane with CGA/min assist.  Pt was forward flexed, decrease gait speed and required 2 standing rest breaks.  Will continue to work with pt to increase strength and balance with amb and improve gait with straight cane.  Outcome Measures     Row Name 06/05/21 1400             How much help from another is currently needed...    Putting on and taking off regular lower body clothing?  2   -TS      Bathing (including washing, rinsing, and drying)  3  -TS      Toileting (which includes using toilet bed pan or urinal)  3  -TS      Putting on and taking off regular upper body clothing  3  -TS      Taking care of personal grooming (such as brushing teeth)  3  -TS      Eating meals  4  -TS      AM-PAC 6 Clicks Score (OT)  18  -TS        User Key  (r) = Recorded By, (t) = Taken By, (c) = Cosigned By    Initials Name Provider Type    TS Bijal Lindquist COTA/L Occupational Therapy Assistant           Time Calculation:   PT Charges     Row Name 06/06/21 0805             Time Calculation    Start Time  0805  -NAOMI      Stop Time  0817  -NAOMI      Time Calculation (min)  12 min  -NAOMI      PT Received On  06/06/21  -NAOMI         Time Calculation- PT    Total Timed Code Minutes- PT  12 minute(s)  -NAOMI         Timed Charges    86119 - Gait Training Minutes   12  -NAOMI         Total Minutes    Timed Charges Total Minutes  12  -NAOMI       Total Minutes  12  -NAOMI        User Key  (r) = Recorded By, (t) = Taken By, (c) = Cosigned By    Initials Name Provider Type    Domingo Whitaker PTA Physical Therapy Assistant        Therapy Charges for Today     Code Description Service Date Service Provider Modifiers Qty    73421049647 HC GAIT TRAINING EA 15 MIN 6/5/2021 Domingo Dill PTA GP 2    00655339134 HC GAIT TRAINING EA 15 MIN 6/6/2021 Domingo Dill PTA GP 1          PT G-Codes  Outcome Measure Options: AM-PAC 6 Clicks Basic Mobility (PT)  AM-PAC 6 Clicks Score (PT): 12  AM-PAC 6 Clicks Score (OT): 18    Domingo Dill PTA  6/6/2021

## 2021-06-06 NOTE — PROGRESS NOTES
Nephrology ([facility]) Progress Note      Patient:  Mini Gentile  YOB: 1960  Date of Service: 6/6/2021  MRN: [id number]   Acct: [id number]   Primary Care Physician: Bharati Dahl APRN  Advance Directive:   Code Status and Medical Interventions:   Ordered at: 06/02/21 0310     Code Status:    CPR     Medical Interventions (Level of Support Prior to Arrest):    Full     Admit Date: 6/1/2021       Hospital Day: 5  Referring Provider: Tejinder Kent MD      Patient personally seen and examined.  Complete chart including Consults, Notes, Operative Reports, Labs, Cardiology, and Radiology studies reviewed as able.    Chief complaint: End-stage renal disease/altered mental status..    Subjective:  Mini Gentile is a 61 y.o. female  whom we were consulted for end stage renal disease. Maintenance hemodialysis on Tuesday Thursday Saturday at [facility]. Fairly noncompliant with dialysis treatments but did attend on 6/01.  Discharged two weeks ago from [facility] following fistulogram/venoplasty of left extremity on 5/13.  Presented this time with altered mental status. She was hypoglycemic, treated with IV dextrose, and was also given Narcan.  Has been tolerating HD treatments well.    This afternoon she is sitting up and eating lunch.  She feels well.  She is scheduled to go to a nursing home for short-term physical therapy.    Allergies:  Bupropion, Chantix [varenicline], Gabapentin, Azithromycin, Levofloxacin, Penicillins, and Sulfa antibiotics    Home Meds:  Medications Prior to Admission   Medication Sig Dispense Refill Last Dose   • albuterol sulfate  (90 Base) MCG/ACT inhaler Inhale 2 puffs Every 4 (Four) Hours As Needed for Wheezing. (Patient taking differently: Inhale 2 puffs Every 6 (Six) Hours As Needed for Wheezing.) 18 g 0 Past Month at Unknown time   • aspirin 81 MG EC tablet Take 81 mg by mouth Daily.   6/1/2021 at Unknown time   •  atropine 1 % ophthalmic solution Administer 1 drop into the left eye Daily.   6/1/2021 at Unknown time   • brimonidine (ALPHAGAN) 0.2 % ophthalmic solution Administer 1 drop into the left eye 2 (Two) Times a Day As Needed.   6/1/2021 at Unknown time   • carvedilol (COREG) 6.25 MG tablet Take 6.25 mg by mouth 2 (Two) Times a Day With Meals.   6/1/2021 at Unknown time   • cholecalciferol (VITAMIN D3) 1000 units tablet Take 2,000 Units by mouth Daily.   6/1/2021 at Unknown time   • dorzolamide (TRUSOPT) 2 % ophthalmic solution Administer 1 drop into the left eye 2 (Two) Times a Day.   6/1/2021 at Unknown time   • fluticasone (FLONASE) 50 MCG/ACT nasal spray 2 sprays into the nostril(s) as directed by provider 2 (Two) Times a Day.   Past Week at Unknown time   • insulin glargine (LANTUS) 100 UNIT/ML injection Inject 10 Units under the skin Every Night.   6/1/2021 at Unknown time   • ipratropium-albuterol (DUO-NEB) 0.5-2.5 mg/3 ml nebulizer Take 3 mL by nebulization Every 4 (Four) Hours As Needed for Wheezing or Shortness of Air.   Past Month at Unknown time   • losartan (COZAAR) 50 MG tablet Take 50 mg by mouth Daily. Pt was decreased to one daily.   6/1/2021 at Unknown time   • oxyCODONE-acetaminophen (PERCOCET) 7.5-325 MG per tablet Take 1 tablet by mouth Every 8 (Eight) Hours As Needed. Pt has one pill left. Last filled 5/21/21 6/1/2021 at Unknown time   • pantoprazole (PROTONIX) 40 MG EC tablet Take 1 tablet by mouth Daily. 30 tablet 1 6/1/2021 at Unknown time   • pregabalin (LYRICA) 50 MG capsule Take 50 mg by mouth 2 (Two) Times a Day.   6/1/2021 at Unknown time   • traMADol (ULTRAM) 50 MG tablet Take 50 mg by mouth Every 6 (Six) Hours As Needed for Moderate Pain . Pt takes one daily.   6/1/2021 at Unknown time   • Travoprost (TRAVATAN OP) Apply  to eye(s) as directed by provider.   5/31/2021   • pravastatin (PRAVACHOL) 10 MG tablet Take 10 mg by mouth Every Night.   5/31/2021   • Sucroferric Oxyhydroxide  (Velphoro) 500 MG chewable tablet Chew 1 tablet. Takes with Dialysis   Unknown at Unknown time       Medicines:  Current Facility-Administered Medications   Medication Dose Route Frequency Provider Last Rate Last Admin   • acetaminophen (TYLENOL) tablet 650 mg  650 mg Oral Q4H PRN Bin Guy DO   650 mg at 06/05/21 2134    Or   • acetaminophen (TYLENOL) suppository 650 mg  650 mg Rectal Q4H PRN Bin Guy DO       • aspirin EC tablet 81 mg  81 mg Oral Daily Bin Guy DO   81 mg at 06/06/21 0830   • atropine 1 % ophthalmic solution 1 drop  1 drop Left Eye Daily Bin Guy DO   1 drop at 06/06/21 0835   • brimonidine (ALPHAGAN) 0.2 % ophthalmic solution 1 drop  1 drop Left Eye TID Bin Guy DO   1 drop at 06/06/21 0838   • cholecalciferol (VITAMIN D3) tablet 2,000 Units  2,000 Units Oral Daily Bin Guy DO   2,000 Units at 06/06/21 0830   • dextrose (D50W) 25 g/ 50mL Intravenous Solution 25 g  25 g Intravenous Q15 Min PRN Bin Guy DO       • dextrose (GLUTOSE) oral gel 15 g  15 g Oral Q15 Min PRN Bin Guy DO       • dorzolamide (TRUSOPT) 2 % ophthalmic solution 1 drop  1 drop Left Eye BID Bin Guy DO   1 drop at 06/06/21 0836   • enoxaparin (LOVENOX) syringe 30 mg  30 mg Subcutaneous Q24H Bin Guy DO   30 mg at 06/06/21 0832   • epoetin vika-epbx (RETACRIT) injection 10,000 Units  10,000 Units Subcutaneous Once per day on Mon Wed Fri Jose Caballero MD   10,000 Units at 06/04/21 1836   • famotidine (PEPCID) tablet 20 mg  20 mg Oral Daily Bin Guy DO   20 mg at 06/06/21 0830   • Ferric Citrate tablet 840 mg  4 tablet Oral TID With Meals Bin Guy DO   840 mg at 06/06/21 1128   • fluticasone (FLONASE) 50 MCG/ACT nasal spray 2 spray  2 spray Nasal BID Bin Guy,    2 spray at 06/06/21 0832   • glucagon (human recombinant) (GLUCAGEN DIAGNOSTIC) injection 1 mg  1 mg Subcutaneous Q15 Min PRN Bin Guy, DO       •  insulin lispro (humaLOG) injection 2-7 Units  2-7 Units Subcutaneous TID AC Bin Guy DO   3 Units at 06/05/21 1728   • ipratropium-albuterol (DUO-NEB) nebulizer solution 3 mL  3 mL Nebulization Q4H PRN Bin Guy DO       • lactobacillus acidophilus (RISAQUAD) capsule 1 capsule  1 capsule Oral Daily Bin Guy DO   1 capsule at 06/05/21 1406   • loperamide (IMODIUM) capsule 4 mg  4 mg Oral Once Bin Guy DO       • metoclopramide (REGLAN) tablet 5 mg  5 mg Oral 4x Daily AC & at Bedtime Bin Guy DO   5 mg at 06/06/21 0632   • ondansetron (ZOFRAN) tablet 4 mg  4 mg Oral Q6H PRN Bin Guy DO       • Pharmacy to Dose Zosyn   Does not apply Continuous PRN Bin Guy DO       • piperacillin-tazobactam (ZOSYN) 3.375 g in iso-osmotic dextrose 50 ml (premix)  3.375 g Intravenous Q12H Bin Guy DO   3.375 g at 06/06/21 0948   • pravastatin (PRAVACHOL) tablet 10 mg  10 mg Oral Nightly Bin Guy DO   10 mg at 06/05/21 2119   • sertraline (ZOLOFT) tablet 25 mg  25 mg Oral Daily Bin Guy DO   25 mg at 06/06/21 0830   • sodium chloride 0.9 % bolus 100 mL  100 mL Intravenous PRN Jose Caballero MD       • sodium chloride 0.9 % flush 10 mL  10 mL Intravenous Q12H Bin Guy DO   10 mL at 06/06/21 0832   • sodium chloride 0.9 % flush 10 mL  10 mL Intravenous PRN Bin Guy DO       • timolol (TIMOPTIC) 0.5 % ophthalmic solution 1 drop  1 drop Left Eye Daily Bin Guy DO   1 drop at 06/06/21 0836       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    • Chronic otitis media    • Diabetes (CMS/[facility])    • End stage renal disease on dialysis (CMS/HCC)    • Eustachian tube dysfunction    • GERD (gastroesophageal reflux disease)    • Glaucoma    • HTN (hypertension)    • Hyperlipidemia    • Mucoid otitis media    • Noncompliance of patient with renal dialysis (CMS/HCC)    •  Tobacco abuse        Past Surgical History:  Past Surgical History:   Procedure Laterality Date   • ARTERIOVENOUS FISTULA     • CATARACT EXTRACTION WITH INTRAOCULAR LENS IMPLANT     •  SECTION     • CHOLECYSTECTOMY     • MYRINGOTOMY W/ TUBES Bilateral    • MYRINGOTOMY W/ TUBES Right    • TUBAL ABDOMINAL LIGATION         Family History  Family History   Problem Relation Age of Onset   • Heart disease Mother    • Diabetes Father    • Colon cancer Neg Hx    • Colon polyps Neg Hx        Social History  Social History     Socioeconomic History   • Marital status: Single     Spouse name: Not on file   • Number of children: Not on file   • Years of education: Not on file   • Highest education level: Not on file   Tobacco Use   • Smoking status: Current Every Day Smoker     Packs/day: 2.00     Years: 6.00     Pack years: 12.00     Types: Cigarettes   • Smokeless tobacco: Never Used   Vaping Use   • Vaping Use: Never used   Substance and Sexual Activity   • Alcohol use: No   • Drug use: No   • Sexual activity: Defer         Review of Systems:  General: Weak  Cardiac: Denies any chest pain or chest pressure  Pulmonary: Denies any shortness of breath  Gastrointestinal denies any nausea and vomiting  : Patient is currently anuric    Objective:  Patient Vitals for the past 24 hrs:   BP Temp Temp src Pulse Resp SpO2   21 1127 104/63 97.8 °F (36.6 °C) Oral 100 16 94 %   21 0736 107/62 97.6 °F (36.4 °C) Oral 78 16 98 %   21 0310 104/65 98.3 °F (36.8 °C) Oral 75 16 95 %   21 0004 105/62 97.9 °F (36.6 °C) Oral 78 18 98 %   21 1952 142/89 97.6 °F (36.4 °C) Oral 89 18 99 %   21 1437 126/94 97.5 °F (36.4 °C) Oral 88 18 93 %       Intake/Output Summary (Last 24 hours) at 2021 1420  Last data filed at 2021 2116  Gross per 24 hour   Intake 290 ml   Output --   Net 290 ml     General: awake, oriented X 1   Neck: supple, no JVD  Chest:  clear to auscultation bilaterally without  respiratory distress  CVS: regular rate and rhythm  Abdominal: soft, nontender, positive bowel sounds  Extremities: no cyanosis or edema  Skin: warm and dry without rash  Neuro: no focal motor deficits    Labs:  Results from last 7 days   Lab Units 06/06/21  0453 06/04/21  0449 06/03/21  0239   WBC 10*3/mm3 3.53 2.79* 3.04*   HEMOGLOBIN g/dL 10.0* 10.2* 9.5*   HEMATOCRIT % 31.3* 31.2* 30.4*   PLATELETS 10*3/mm3 118* 146 174         Results from last 7 days   Lab Units 06/06/21  0453 06/05/21  0551 06/04/21  0449 06/02/21  0318 06/01/21  1910 06/01/21  1759   SODIUM mmol/L 134* 135* 132* 131* 131* 133*   POTASSIUM mmol/L 3.1* 4.0 4.3 3.6 3.6 3.7   CHLORIDE mmol/L 94* 95* 97* 92* 89* 91*   CO2 mmol/L 26.0 24.0 19.0* 29.0 26.0 28.0   BUN mg/dL 14 21 15 25* 24* 23   CREATININE mg/dL 2.93* 3.74* 2.98* 3.90* 3.42* 3.30*   CALCIUM mg/dL 9.6 9.7 9.0 8.3* 9.1 9.1   BILIRUBIN mg/dL  --   --   --  0.3 0.4 0.4   ALK PHOS U/L  --   --   --  206* 260* 263*   ALT (SGPT) U/L  --   --   --  14 17 17   AST (SGOT) U/L  --   --   --  17 21 20   GLUCOSE mg/dL 80 141* 104* 200* 144* 126*       Radiology:   Imaging Results (Last 72 Hours)     ** No results found for the last 72 hours. **          Culture:  Blood Culture   Date Value Ref Range Status   06/01/2021 No growth at 24 hours  Preliminary   06/01/2021 Abnormal Stain (C)  Preliminary         Assessment   1.  End stage renal disease/on maintenance hemodialysis.  2.  Metabolic encephalopathy  3.  Sepsis  4.  Type 2 diabetes with hypoglycemia  5.  Hypertension  6.  Anemia of CKD  7.    Hypokalemia.  7.  Hyponatremia     Plan:  1.  Discharge to nursing home soon.  2.  Plan was discussed with her  at the bedside.      Jose Caballero MD  6/6/2021  14:20 CDT

## 2021-06-06 NOTE — PLAN OF CARE
Goal Outcome Evaluation:  Plan of Care Reviewed With: patient  Progress: no change  Outcome Summary: A & O x 4, up with assistance, sat in chair a while, IV ABX given, scratches/scabs to back where patient has been scratching herself, dialysis Tuesday, Thursday, and Saturday,fistula to L arm, RIJ in place

## 2021-06-06 NOTE — PROGRESS NOTES
[facility]  INPATIENT PROGRESS NOTE    Patient Name: Mini Gentile  Date of Admission: 6/1/2021  Today's Date: 06/06/21  Length of Stay: 5  Primary Care Physician: Bharati Dahl APRN    Subjective   Chief Complaint: Altered mental status    HPI:  Patient seen with her  at bedside.  This is most awake alert interactive she has been on arrival.  She says she feels a lot better.  Denies any shortness of breath or cough.  Eating well.  Only issue at this time is ongoing loose stools.  She has no abdominal pain or cramping.  No fevers.  No weight elevation.  She is asking for some Imodium.  Discussed with her that she has a probiotic which she keeps refusing and she says she will take it.   states she essentially is back to baseline.      Review of Systems   All pertinent negatives and positives are as above. All other systems have been reviewed and are negative unless otherwise stated.     Objective    Temp:  [97.5 °F (36.4 °C)-98.3 °F (36.8 °C)] 97.6 °F (36.4 °C)  Heart Rate:  [75-89] 78  Resp:  [16-18] 16  BP: (104-142)/(62-94) 107/62  Physical Exam  GEN: Awake and interactive, following commands, no acute distress  HEENT:  PERRLA, Anicteric, Trachea midline  Lungs: coarse bs, no wheezing  Heart: RRR, +S1/s2, no rub  ABD: soft, nt/nd, +BS, no guarding/rebound  Extremities:  no cyanosis, no edema  Skin: no rashes or petechiae   Neuro: AAOx2-3, MONSALVE, good  b/l, no obvious focal deficits  Psych: flat        Results Review:  I have reviewed the labs, radiology results, and diagnostic studies.    Laboratory Data:   Results from last 7 days   Lab Units 06/06/21  0453 06/04/21  0449 06/03/21  0239   WBC 10*3/mm3 3.53 2.79* 3.04*   HEMOGLOBIN g/dL 10.0* 10.2* 9.5*   HEMATOCRIT % 31.3* 31.2* 30.4*   PLATELETS 10*3/mm3 118* 146 174        Results from last 7 days   Lab Units 06/06/21  0453 06/05/21  0551 06/04/21  0449 06/02/21  0318 06/01/21  1910  06/01/21  1759   SODIUM mmol/L 134* 135* 132* 131* 131* 133*   POTASSIUM mmol/L 3.1* 4.0 4.3 3.6 3.6 3.7   CHLORIDE mmol/L 94* 95* 97* 92* 89* 91*   CO2 mmol/L 26.0 24.0 19.0* 29.0 26.0 28.0   BUN mg/dL 14 21 15 25* 24* 23   CREATININE mg/dL 2.93* 3.74* 2.98* 3.90* 3.42* 3.30*   CALCIUM mg/dL 9.6 9.7 9.0 8.3* 9.1 9.1   BILIRUBIN mg/dL  --   --   --  0.3 0.4 0.4   ALK PHOS U/L  --   --   --  206* 260* 263*   ALT (SGPT) U/L  --   --   --  14 17 17   AST (SGOT) U/L  --   --   --  17 21 20   GLUCOSE mg/dL 80 141* 104* 200* 144* 126*       Culture Data:   No results found for: BLOODCX, URINECX, WOUNDCX, MRSACX, RESPCX, STOOLCX    Radiology Data:   Imaging Results (Last 24 Hours)     ** No results found for the last 24 hours. **          I have reviewed the patient's current medications.     Assessment/Plan     Active Hospital Problems    Diagnosis    • **Altered mental status    • Hypoglycemia    • Aspiration into airway    • End stage renal failure on dialysis (CMS/[facility])    • Diabetes (CMS/[facility])    • HTN (hypertension)        #1 altered mental status/encephalopathy -unclear etiology.  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 She was also hypoglycemic on arrival.  Some reports that she responded to Narcan.  She has evidence for aspiration.  Currently being covered with Zosyn.  Head CT was nonacute on arrival.  Her mental status continues to improve with supportive care and treatment.  Continue to monitor.  Intact neuro exam. Ok to d/c vanco.     #2 aspiration -patient was recently hospitalized for aspiration pneumonia after a procedure when she vomited.  CAT scan on arrival here does show some debris in her bronchus.  Currently altered.  Keep NPO.  Will need a speech evaluation when she wakes up.  Covering with Zosyn. She hs been on for 5 days now. Afebrile. No white count. Room air. Will d/c zosyn given GI issue. Monitor    #3 ESRD - nephrology  following for dialysis needs    #4 DM 2 -continue sliding scale insulin and hypoglycemia protocol    #5 essential hypertension -blood pressure more stable today despite none of her home BP meds.  Continue monitor.    #6 right humeral fracture -this occurred all the way back in March.  She was told to follow-up with orthopedics after discharge.      #7 diarrhea - no abd pain, no wbc elevation, no fever. Will give Imodium x1 and monitor.  Discussed her probiotic    Patient previously was recommended for SNF but declined.   has called and states she is too weak and debilitated he cannot take care of at home.  We will plan for SNF placement at discharge.      Discharge Planning: I expect the patient to be discharged to SNF when medically ready.  Precert pending    Electronically signed by Bin Guy DO, 06/06/21, 11:07 CDT.

## 2021-06-07 NOTE — PROGRESS NOTES
[facility]  INPATIENT PROGRESS NOTE    Patient Name: Mini Gentile  Date of Admission: 6/1/2021  Today's Date: 06/07/21  Length of Stay: 6  Primary Care Physician: Bharati Dahl APRN    Subjective   Chief Complaint: Follow-up  HPI   Admitted for change in mental status    Chest x-ray from May 13 in comparison to chest x-ray dated March 2021 still showed right lower lobe opacity described as layering pleural effusion.  She had a chest CT sometime during this admission as well which I outlined below:    This is called by radiologist as large right pleural effusion.  Details of report as outlined above.    Patient is doing well and looking forward to be released from the hospital  She no longer has diarrhea.  She told me she took 2 doses of Imodium yesterday.    Noted that Dr. Guy in his progress note yesterday mention of discontinuing Zosyn however it is being infused currently.  She is on day 6 technically on Zosyn.  She is maintaining room air SPO2 greater than 90.    She is currently working with therapist at bedside.  The therapist mentioned that she is needing some cues to prevent weightbearing on her known fractured right humerus since March.    Noted as well as a downward trend in her platelet count.  She is on Lovenox.  She has no reported bleeding.         Review of Systems     All pertinent nsgatives and positives are as above. All other systems have been reviewed and are negative unless otherwise stated.     Objective    Temp:  [97.6 °F (36.4 °C)-98.2 °F (36.8 °C)] 98 °F (36.7 °C)  Heart Rate:  [] 82  Resp:  [16-18] 18  BP: (104-156)/(63-87) 156/78  Physical Exam  Vitals reviewed.   Constitutional:       Appearance: Normal appearance.      Comments: Right IJ central line   HENT:      Head: Normocephalic and atraumatic.      Nose: Nose normal.      Mouth/Throat:      Mouth: Mucous membranes are moist.   Eyes:      General: No scleral  icterus.     Extraocular Movements: Extraocular movements intact.      Conjunctiva/sclera: Conjunctivae normal.      Pupils: Pupils are equal, round, and reactive to light.   Cardiovascular:      Rate and Rhythm: Normal rate and regular rhythm.      Pulses: Normal pulses.   Pulmonary:      Effort: Pulmonary effort is normal. No respiratory distress.      Breath sounds: No wheezing.      Comments: Diminished breath sounds right lung base  Abdominal:      General: There is no distension.      Palpations: Abdomen is soft.      Tenderness: There is no abdominal tenderness. There is no guarding.   Musculoskeletal:         General: Swelling present.      Cervical back: Normal range of motion.   Skin:     General: Skin is warm and dry.      Capillary Refill: Capillary refill takes less than 2 seconds.      Coloration: Skin is not jaundiced.   Neurological:      Mental Status: She is alert.   Psychiatric:         Mood and Affect: Mood normal.         Behavior: Behavior normal.         Thought Content: Thought content normal.             Results Review:  I have reviewed the labs, radiology results, and diagnostic studies.    Laboratory Data:   Results from last 7 days   Lab Units 06/07/21  0449 06/06/21  0453 06/04/21  0449   WBC 10*3/mm3 4.10 3.53 2.79*   HEMOGLOBIN g/dL 10.4* 10.0* 10.2*   HEMATOCRIT % 33.0* 31.3* 31.2*   PLATELETS 10*3/mm3 107* 118* 146        Results from last 7 days   Lab Units 06/07/21  0449 06/06/21  0453 06/05/21  0551 06/02/21  0318 06/01/21  1910 06/01/21  1759   SODIUM mmol/L 130* 134* 135* 131* 131* 133*   POTASSIUM mmol/L 3.7 3.1* 4.0 3.6 3.6 3.7   CHLORIDE mmol/L 90* 94* 95* 92* 89* 91*   CO2 mmol/L 24.0 26.0 24.0 29.0 26.0 28.0   BUN mg/dL 21 14 21 25* 24* 23   CREATININE mg/dL 4.02* 2.93* 3.74* 3.90* 3.42* 3.30*   CALCIUM mg/dL 9.8 9.6 9.7 8.3* 9.1 9.1   BILIRUBIN mg/dL  --   --   --  0.3 0.4 0.4   ALK PHOS U/L  --   --   --  206* 260* 263*   ALT (SGPT) U/L  --   --   --  14 17 17   AST  (SGOT) U/L  --   --   --  17 21 20   GLUCOSE mg/dL 169* 80 141* 200* 144* 126*       Culture Data:   Blood Culture   Date Value Ref Range Status   06/01/2021 No growth at 5 days  Final   06/01/2021 Staphylococcus, coagulase negative (C)  Final     Comment:     Probable contaminant requires clinical correlation, susceptibility not performed unless requested by physician.         Radiology Data:   Imaging Results (Last 24 Hours)     Procedure Component Value Units Date/Time    XR Chest 1 View [[id number]] Collected: 06/07/21 0709     Updated: 06/07/21 0714    Narrative:      EXAMINATION: XR CHEST 1 VW- 6/7/2021 7:09 AM CDT     HISTORY: central line placement verification; R41.82-Altered mental  status, unspecified; E16.2-Hypoglycemia, unspecified; J69.0-Pneumonitis  due to inhalation of food and vomit; R13.10-Dysphagia, unspecified;  Z78.9-Other specified health status; Z74.09-Other reduced mobility.     REPORT: A frontal view the chest was obtained.     COMPARISON: Chest x-ray 6/1/2021.     The tip of the right internal jugular central line remains in  satisfactory position at the proximal SVC. There is continued stable  consolidation of the right base with obscuration of the diaphragm and  right heart margin, with a moderate size right pleural effusion.  Underlying pneumonia and/or atelectasis are possibilities. The left lung  shows no focal infiltrates, there is mild interstitial prominence. Mild  central vascular crowding is noted. Heart size is normal. There is a  stent in the left subclavian region as before. No pneumothorax is  identified.       Impression:      Stable chest x-ray, the right IJ central line tip remains in  satisfactory position over the proximal SVC. Continued consolidation of  the right lung base with effusion and atelectasis, underlying pneumonia  is not excluded.  This report was finalized on 06/07/2021 07:11 by Dr. Colton Hampton MD.          I have reviewed the patient's current  medications.     Assessment/Plan     Active Hospital Problems    Diagnosis    • **Altered mental status    • Hypoglycemia    • Aspiration into airway    • End stage renal failure on dialysis (CMS/[facility], located [facility])    • Diabetes (CMS/[facility] located [facility])    • HTN (hypertension)      Altered mental status/encephalopathy unclear etiology  Aspiration.  Recent history of aspiration pneumonia  End-stage renal disease on dialysis Tuesday Thursday Saturday  Diabetes mellitus  Hypertension  History of right humeral fracture on a sling in March  Diarrhea resolved    Continue physical rehabilitation  Discussed with .  Pre-CERT started last Thursday.  Awaiting and possibly may go through today  DC Zosyn as discussed with nurse Sims  Reviewed chest x-ray, CT scanning of chest and compared from prior  Dialysis scheduled CTA chest  Anticipate discontinuation of IJ today   Continue current management    aspirin, 81 mg, Oral, Daily  atropine, 1 drop, Left Eye, Daily  brimonidine, 1 drop, Left Eye, TID  cholecalciferol, 2,000 Units, Oral, Daily  dorzolamide, 1 drop, Left Eye, BID  epoetin vika/vika-epbx, 10,000 Units, Subcutaneous, Once per day on Mon Wed Fri  famotidine, 20 mg, Oral, Daily  Ferric Citrate, 4 tablet, Oral, TID With Meals  fluticasone, 2 spray, Nasal, BID  insulin lispro, 2-7 Units, Subcutaneous, TID AC  lactobacillus acidophilus, 1 capsule, Oral, Daily  metoclopramide, 5 mg, Oral, 4x Daily AC & at Bedtime  pravastatin, 10 mg, Oral, Nightly  sertraline, 25 mg, Oral, Daily  sodium chloride, 10 mL, Intravenous, Q12H  timolol, 1 drop, Left Eye, Daily            Discharge Planning:?    Electronically signed by Sundeep Aldridge MD, 06/07/21, 11:00 CDT.

## 2021-06-07 NOTE — THERAPY TREATMENT NOTE
Acute Care - Occupational Therapy Treatment Note  [facility]     Patient Name: Mini Gentile  : 1960  MRN: [id number]  Today's Date: 2021             Admit Date: 2021       ICD-10-CM ICD-9-CM   1. Altered mental status, unspecified altered mental status type  R41.82 780.97   2. Hypoglycemia  E16.2 251.2   3. Aspiration pneumonia, unspecified aspiration pneumonia type, unspecified laterality, unspecified part of lung (CMS/[facility])  J69.0 507.0   4. Dysphagia, unspecified type  R13.10 787.20   5. Decreased activities of daily living (ADL)  Z78.9 V49.89   6. 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   • Eustachian tube dysfunction   • Chronic otitis media   • Pneumonia of left upper lobe due to Streptococcus (CMS/[facility])   • End stage renal failure on dialysis (CMS/[facility])   • Diabetes (CMS/[facility])   • Glaucoma   • HTN (hypertension)   • Proteinuria   • Type 2 diabetes mellitus, with long-term current use of insulin (CMS/[facility])   • Chronic anemia   • Acute pulmonary edema (CMS/[facility])   • Non-compliance with renal dialysis (CMS/[facility])   • Elevated troponin due to ESRD    • Hyperkalemia   • Encephalopathy, metabolic, due to uremia   • High anion gap metabolic acidosis due to uremia   • Respiratory distress   • Acute diastolic heart failure (CMS/[facility])   • Lymph node enlargement, left axillary measuring 1.1 cm -follow-up for primary care   • Nausea vomiting and diarrhea   • Acute diastolic heart failure (CMS/[facility])   • Diabetes mellitus with ophthalmic complication (CMS/[facility])   • Tobacco abuse   • Urinary tract infection with hematuria   • Pneumonia due to infectious organism   • Abnormal urine findings   • Closed fracture of right proximal humerus   • Hyponatremia   • Hypothyroidism (acquired)   • Forehead laceration secondary to fall   • Heme positive stool   • Dialysis AV fistula malfunction, initial encounter (CMS/[facility])   • Aspiration  into airway   • Bronchospasm, acute   • Altered mental status   • Protein-calorie malnutrition (CMS/HCC)   • Pupil irregular   • Sepsis (CMS/HCC)   • Hypoglycemia     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    • Chronic otitis media    • Diabetes (CMS/HCC)    • End stage renal disease on dialysis (CMS/[facility])    • Eustachian tube dysfunction    • GERD (gastroesophageal reflux disease)    • Glaucoma    • HTN (hypertension)    • Hyperlipidemia    • Mucoid otitis media    • Noncompliance of patient with renal dialysis (CMS/[facility])    • Tobacco abuse      Past Surgical History:   Procedure Laterality Date   • ARTERIOVENOUS FISTULA     • CATARACT EXTRACTION WITH INTRAOCULAR LENS IMPLANT     •  SECTION     • CHOLECYSTECTOMY     • MYRINGOTOMY W/ TUBES Bilateral    • MYRINGOTOMY W/ TUBES Right    • TUBAL ABDOMINAL LIGATION              OT ASSESSMENT FLOWSHEET (last 12 hours)      OT Evaluation and Treatment     Row Name 21 1409                   OT Time and Intention    Subjective Information  complains of;fatigue  -TS        Patient Effort  adequate  -TS           General Information    Existing Precautions/Restrictions  fall;non-weight bearing  -TS           Cognition    Personal Safety Interventions  fall prevention program maintained;nonskid shoes/slippers when out of bed;elopement precautions initiated  -TS           Pain Scale: Numbers Pre/Post-Treatment    Pretreatment Pain Rating  0/10 - no pain  -TS        Posttreatment Pain Rating  0/10 - no pain  -TS           Activities of Daily Living    BADL Assessment/Intervention  upper body dressing;lower body dressing  -TS           Upper Body Dressing Assessment/Training    [place] Level (Upper Body Dressing)  maximum assist (25% patient effort)  -TS        Position (Upper Body Dressing)  supported sitting  -TS        Comment (Upper Body Dressing)  adjust sling to rUE  -TS           Lower Body Dressing  Assessment/Training    [place] Level (Lower Body Dressing)  don;doff;socks;maximum assist (25% patient effort)  -TS        Position (Lower Body Dressing)  supported sitting  -TS           Wound 04/04/21 0142 coccyx    Wound - Properties Group Placement Date: 04/04/21  -VT Placement Time: 0142 -VT Location: coccyx  -VT    Retired Wound - Properties Group Date first assessed: 04/04/21  -VT Time first assessed: 0142 -VT Location: coccyx  -VT       Positioning and Restraints    Pre-Treatment Position  sitting in chair/recliner  -TS        Post Treatment Position  chair  -TS        In Chair  reclined;call light within reach;encouraged to call for assist;exit alarm on;RUE elevated;with brace  -TS          User Key  (r) = Recorded By, (t) = Taken By, (c) = Cosigned By    Initials Name Effective Dates    TS Bijal Lindquist COTA/L 08/02/16 -     VT lAyx Watson RN 08/02/16 -          Occupational Therapy Education                 Title: PT OT SLP Therapies (In Progress)     Topic: Occupational Therapy (In Progress)     Point: ADL training (Done)     Description:   Instruct learner(s) on proper safety adaptation and remediation techniques during self care or transfers.   Instruct in proper use of assistive devices.              Learning Progress Summary           Patient Acceptance, E, VU by TSERING at 6/3/2021 1248                   Point: Home exercise program (Not Started)     Description:   Instruct learner(s) on appropriate technique for monitoring, assisting and/or progressing therapeutic exercises/activities.              Learner Progress:  Not documented in this visit.          Point: Precautions (Done)     Description:   Instruct learner(s) on prescribed precautions during self-care and functional transfers.              Learning Progress Summary           Patient Acceptance, E, VU by TSERING at 6/3/2021 1248                   Point: Body mechanics (Done)     Description:   Instruct learner(s) on proper  positioning and spine alignment during self-care, functional mobility activities and/or exercises.              Learning Progress Summary           Patient Acceptance, E, VU by TSERING at 6/3/2021 1248                               User Key     Initials Effective Dates Name Provider Type Discipline    TSERING 12/04/20 -  Kami Chapman, OTR/L Occupational Therapist OT                  OT Recommendation and Plan          Outcome Measures     Row Name 06/07/21 1400 06/05/21 1400          How much help from another is currently needed...    Putting on and taking off regular lower body clothing?  2  -TS  2  -TS     Bathing (including washing, rinsing, and drying)  3  -TS  3  -TS     Toileting (which includes using toilet bed pan or urinal)  3  -TS  3  -TS     Putting on and taking off regular upper body clothing  3  -TS  3  -TS     Taking care of personal grooming (such as brushing teeth)  3  -TS  3  -TS     Eating meals  4  -TS  4  -TS     AM-PAC 6 Clicks Score (OT)  18  -TS  18  -TS       User Key  (r) = Recorded By, (t) = Taken By, (c) = Cosigned By    Initials Name Provider Type    Bijal Donato GENTILE/L Occupational Therapy Assistant          Time Calculation:   Time Calculation- OT     Row Name 06/07/21 1451             Time Calculation- OT    OT Start Time  1402  -TS      OT Stop Time  1425  -TS      OT Time Calculation (min)  23 min  -TS      Total Timed Code Minutes- OT  23 minute(s)  -TS      OT Received On  06/07/21  -TS         Timed Charges    17542 - OT Self Care/Mgmt Minutes  23  -TS         Total Minutes    Timed Charges Total Minutes  23  -TS       Total Minutes  23  -TS        User Key  (r) = Recorded By, (t) = Taken By, (c) = Cosigned By    Initials Name Provider Type    Bijal Donato GENTILE/L Occupational Therapy Assistant        Therapy Charges for Today     Code Description Service Date Service Provider Modifiers Qty    [id number]  OT SELF CARE/MGMT/TRAIN EA 15 MIN 6/7/2021  Ameena, Bijal SHEN, GENTILE/L GO 2               Bijal SHEN. SEFERINO Lindquist/GABY  6/7/2021

## 2021-06-07 NOTE — PAYOR COMM NOTE
"6/6 CLINICAL UPDATE  [id number]   [id number]    Mini Peña (61 y.o. Female)     Date of Birth Social Security Number Address Home Phone MRN    1960  [address] [place] [phone] [id number]    [place] Marital Status          Other Single       Admission Date Admission Type Admitting Provider Attending Provider Department, Room/Bed    6/1/21 Emergency Sundeep Aldridge MD [place], Glenn Riego, MD [facility] 3A, 357/2    Discharge Date Discharge Disposition Discharge Destination                       Attending Provider: Sundeep Aldridge MD    Allergies: Bupropion, Chantix [Varenicline], Gabapentin, Azithromycin, Levofloxacin, Penicillins, Sulfa Antibiotics    Isolation: None   Infection: None   Code Status: CPR    Ht: 139.7 cm (55\")   Wt: 65.1 kg (143 lb 8 oz)    Admission Cmt: None   Principal Problem: Altered mental status [R41.82]                 Active Insurance as of 6/1/2021     Primary Coverage     Payor Plan Insurance Group Employer/Plan Group    [facility] [employer] MEDICARE REPLACEMENT      Payor Plan Address Payor Plan Phone Number Payor Plan Fax Number Effective Dates    [address] [address] [phone]  4/1/2021 - None Entered    [facility] [id number]       Subscriber Name Subscriber Birth Date Member ID       MINI PEÑA 1960 [id number]           Secondary Coverage     Payor Plan Insurance Group Employer/Plan Group    [facility] WELLCARE MEDICAID      Payor Plan Address Payor Plan Phone Number Payor Plan Fax Number Effective Dates    [address] [address] [phone]  11/4/2016 - None Entered    [facility] [id number]       Subscriber Name Subscriber Birth Date Member ID       MINI PEÑA 1960 [id number]                 Emergency Contacts      (Rel.) Home Phone Work Phone Mobile Phone    Erica Bowling (Other) [phone] -- [phone]    Jose Bowling (Significant Other) " [phone] -- [phone]    Arlette Barker (Daughter) -- -- [phone]    Krupa Julio Cesar (Son) [phone] -- [phone]            Vital Signs (last day)     Date/Time   Temp   Temp src   Pulse   Resp   BP   Patient Position   SpO2    06/07/21 0700   98 (36.7)   Oral   82   18   156/78   Lying   98    06/07/21 0416   97.8 (36.6)   Oral   80   18   150/78   Lying   100    06/06/21 2325   97.6 (36.4)   Temporal   84   16   117/87   Lying   98    06/06/21 1946   98.2 (36.8)   Oral   85   16   153/82   Lying   96    06/06/21 1127   97.8 (36.6)   Oral   100   16   104/63   Sitting   94    06/06/21 0736   97.6 (36.4)   Oral   78   16   107/62   Sitting   98    06/06/21 0310   98.3 (36.8)   Oral   75   16   104/65   Lying   95    06/06/21 0004   97.9 (36.6)   Oral   78   18   105/62   Lying   98              Oxygen Therapy (last day)     Date/Time   SpO2   Device (Oxygen Therapy)   Flow (L/min)   Oxygen Concentration (%)   ETCO2 (mmHg)    06/07/21 0700   98   room air   --   --   --    06/07/21 0416   100   room air   --   --   --    06/06/21 2325   98   room air   --   --   --    06/06/21 2000   --   room air   --   --   --    06/06/21 1946   96   room air   --   --   --    06/06/21 1127   94   room air   --   --   --    06/06/21 0736   98   room air   --   --   --    06/06/21 0310   95   room air   --   --   --    06/06/21 0004   98   room air   --   --   --              Intake & Output (last day)       06/06 0701 - 06/07 0700 06/07 0701 - 06/08 0700    P.O.      IV Piggyback 50     Total Intake(mL/kg) 50 (0.8)     Net +50           Stool Unmeasured Occurrence 3 x         Lines, Drains & Airways    Active LDAs     Name:   Placement date:   Placement time:   Site:   Days:    CVC Triple Lumen 06/01/21 Right Internal jugular   06/01/21    --    Internal jugular   6                Current Facility-Administered Medications   Medication Dose Route Frequency Provider Last Rate Last Admin   • acetaminophen (TYLENOL)  tablet 650 mg  650 mg Oral Q4H PRN Bin Guy DO   650 mg at 06/06/21 1654    Or   • acetaminophen (TYLENOL) suppository 650 mg  650 mg Rectal Q4H PRN Bin Guy DO       • aspirin EC tablet 81 mg  81 mg Oral Daily Bin Guy DO   81 mg at 06/07/21 0925   • atropine 1 % ophthalmic solution 1 drop  1 drop Left Eye Daily Bin Guy DO   1 drop at 06/07/21 0924   • brimonidine (ALPHAGAN) 0.2 % ophthalmic solution 1 drop  1 drop Left Eye TID Bin Guy DO   1 drop at 06/07/21 0924   • cholecalciferol (VITAMIN D3) tablet 2,000 Units  2,000 Units Oral Daily Bin Guy DO   2,000 Units at 06/07/21 0925   • dextrose (D50W) 25 g/ 50mL Intravenous Solution 25 g  25 g Intravenous Q15 Min PRN Bin Guy DO       • dextrose (GLUTOSE) oral gel 15 g  15 g Oral Q15 Min PRN Bin Guy DO       • dorzolamide (TRUSOPT) 2 % ophthalmic solution 1 drop  1 drop Left Eye BID Bin Guy DO   1 drop at 06/07/21 0924   • enoxaparin (LOVENOX) syringe 30 mg  30 mg Subcutaneous Q24H Bin Guy DO   30 mg at 06/07/21 0924   • epoetin vika-epbx (RETACRIT) injection 10,000 Units  10,000 Units Subcutaneous Once per day on Mon Wed Fri Jose Caballero MD   10,000 Units at 06/04/21 1836   • famotidine (PEPCID) tablet 20 mg  20 mg Oral Daily Bin Guy DO   20 mg at 06/07/21 0925   • Ferric Citrate tablet 840 mg  4 tablet Oral TID With Meals Bin Guy DO   840 mg at 06/07/21 0924   • fluticasone (FLONASE) 50 MCG/ACT nasal spray 2 spray  2 spray Nasal BID Bin Guy DO   2 spray at 06/07/21 0923   • glucagon (human recombinant) (GLUCAGEN DIAGNOSTIC) injection 1 mg  1 mg Subcutaneous Q15 Min PRN Bin Guy, DO       • insulin lispro (humaLOG) injection 2-7 Units  2-7 Units Subcutaneous TID AC Bin Guy,    2 Units at 06/07/21 0923   • ipratropium-albuterol (DUO-NEB) nebulizer solution 3 mL  3 mL Nebulization Q4H PRN Bin Guy, DO       •  lactobacillus acidophilus (RISAQUAD) capsule 1 capsule  1 capsule Oral Daily Bin Guy DO   1 capsule at 06/07/21 0924   • metoclopramide (REGLAN) tablet 5 mg  5 mg Oral 4x Daily AC & at Bedtime Bin Guy DO   5 mg at 06/07/21 0551   • ondansetron (ZOFRAN) tablet 4 mg  4 mg Oral Q6H PRN Bin Guy DO       • Pharmacy to Dose Zosyn   Does not apply Continuous PRN Bin Guy DO       • piperacillin-tazobactam (ZOSYN) 3.375 g in iso-osmotic dextrose 50 ml (premix)  3.375 g Intravenous Q12H Bin Guy DO   3.375 g at 06/07/21 0925   • pravastatin (PRAVACHOL) tablet 10 mg  10 mg Oral Nightly Bin Guy DO   10 mg at 06/06/21 2128   • sertraline (ZOLOFT) tablet 25 mg  25 mg Oral Daily Bin Guy DO   25 mg at 06/07/21 0924   • sodium chloride 0.9 % bolus 100 mL  100 mL Intravenous PRN Jose Caballero MD       • sodium chloride 0.9 % flush 10 mL  10 mL Intravenous Q12H Bin Guy DO   10 mL at 06/07/21 0927   • sodium chloride 0.9 % flush 10 mL  10 mL Intravenous PRN Bin Guy DO       • timolol (TIMOPTIC) 0.5 % ophthalmic solution 1 drop  1 drop Left Eye Daily Bin Guy DO   1 drop at 06/07/21 0924     Lab Results (last 24 hours)     Procedure Component Value Units Date/Time    POC Glucose Once [[id number]]  (Abnormal) Collected: 06/07/21 0759    Specimen: Blood Updated: 06/07/21 0811     Glucose 152 mg/dL      Comment: : [id number] Anibal Sims ([place]) CMeter ID: [id number]       Basic Metabolic Panel [[id number]]  (Abnormal) Collected: 06/07/21 0449    Specimen: Blood Updated: 06/07/21 0644     Glucose 169 mg/dL      BUN 21 mg/dL      Creatinine 4.02 mg/dL      Sodium 130 mmol/L      Potassium 3.7 mmol/L      Chloride 90 mmol/L      CO2 24.0 mmol/L      Calcium 9.8 mg/dL      eGFR   Amer --     Comment: <15 Indicative of kidney failure.        eGFR Non African Amer 11 mL/min/1.73      Comment: <15 Indicative of kidney failure.         BUN/Creatinine Ratio 5.2     Anion Gap 16.0 mmol/L     Narrative:      GFR Normal >60  Chronic Kidney Disease <60  Kidney Failure <15      CBC & Differential [[id number]]  (Abnormal) Collected: 06/07/21 0449    Specimen: Blood Updated: 06/07/21 0604    Narrative:      The following orders were created for panel order CBC & Differential.  Procedure                               Abnormality         Status                     ---------                               -----------         ------                     CBC Auto Differential[[id number]]        Abnormal            Final result                 Please view results for these tests on the individual orders.    CBC Auto Differential [[id number]]  (Abnormal) Collected: 06/07/21 0449    Specimen: Blood Updated: 06/07/21 0604     WBC 4.10 10*3/mm3      RBC 3.34 10*6/mm3      Hemoglobin 10.4 g/dL      Hematocrit 33.0 %      MCV 98.8 fL      MCH 31.1 pg      MCHC 31.5 g/dL      RDW 17.1 %      RDW-SD 61.8 fl      MPV 9.4 fL      Platelets 107 10*3/mm3      Neutrophil % 60.5 %      Lymphocyte % 17.8 %      Monocyte % 17.3 %      Eosinophil % 3.4 %      Basophil % 0.5 %      Immature Grans % 0.5 %      Neutrophils, Absolute 2.48 10*3/mm3      Lymphocytes, Absolute 0.73 10*3/mm3      Monocytes, Absolute 0.71 10*3/mm3      Eosinophils, Absolute 0.14 10*3/mm3      Basophils, Absolute 0.02 10*3/mm3      Immature Grans, Absolute 0.02 10*3/mm3      nRBC 1.2 /100 WBC     Blood Culture - Blood, Arm, Right [[id number]] Collected: 06/01/21 1711    Specimen: Blood from Arm, Right Updated: 06/06/21 1730     Blood Culture No growth at 5 days    POC Glucose Once [[id number]]  (Abnormal) Collected: 06/06/21 1640    Specimen: Blood Updated: 06/06/21 1652     Glucose 184 mg/dL      Comment: : JAKOB Bonds AllieMeter ID: [id number]           Imaging Results (Last 24 Hours)     Procedure Component Value Units Date/Time    XR Chest 1 View [[id number]] Collected: 06/07/21 0709      Updated: 06/07/21 0714    Narrative:      EXAMINATION: XR CHEST 1 VW- 6/7/2021 7:09 AM CDT     HISTORY: central line placement verification; R41.82-Altered mental  status, unspecified; E16.2-Hypoglycemia, unspecified; J69.0-Pneumonitis  due to inhalation of food and vomit; R13.10-Dysphagia, unspecified;  Z78.9-Other specified health status; Z74.09-Other reduced mobility.     REPORT: A frontal view the chest was obtained.     COMPARISON: Chest x-ray 6/1/2021.     The tip of the right internal jugular central line remains in  satisfactory position at the proximal SVC. There is continued stable  consolidation of the right base with obscuration of the diaphragm and  right heart margin, with a moderate size right pleural effusion.  Underlying pneumonia and/or atelectasis are possibilities. The left lung  shows no focal infiltrates, there is mild interstitial prominence. Mild  central vascular crowding is noted. Heart size is normal. There is a  stent in the left subclavian region as before. No pneumothorax is  identified.       Impression:      Stable chest x-ray, the right IJ central line tip remains in  satisfactory position over the proximal SVC. Continued consolidation of  the right lung base with effusion and atelectasis, underlying pneumonia  is not excluded.  This report was finalized on 06/07/2021 07:11 by Dr. Colton Hampton MD.        Orders (last 24 hrs)      Start     Ordered    06/07/21 0811  POC Glucose Once  Once      06/07/21 0759    06/07/21 0600  CBC & Differential  Morning Draw      06/06/21 1422    06/07/21 0600  Basic Metabolic Panel  Morning Draw,   Status:  Canceled      06/06/21 1422    06/07/21 0600  CBC Auto Differential  PROCEDURE ONCE      06/07/21 0001    06/07/21 0259  XR Chest 1 View  1 Time Imaging      06/07/21 0300    06/06/21 1653  POC Glucose Once  Once      06/06/21 1640    06/06/21 1412  POC Glucose Once  Once      06/06/21 1122    06/06/21 1411  POC Glucose Once  Once      06/05/21  1725    06/06/21 1411  POC Glucose Once  Once      06/05/21 2123    06/06/21 1409  POC Glucose Once  Once      06/04/21 2103    06/06/21 1330  loperamide (IMODIUM) capsule 4 mg  Once      06/06/21 1244    06/05/21 0943  sodium chloride 0.9 % bolus 100 mL  As Needed     Note to Pharmacy: Use if b/p systolic less than 90    06/05/21 0944    06/04/21 1800  epoetin vika-epbx (RETACRIT) injection 10,000 Units  Once per day on Mon Wed Fri 06/04/21 1604    06/04/21 0200  Vital Signs Every Hour and Per Hospital Policy Based on Patient Condition  Every Shift      06/03/21 1440    06/03/21 0900  famotidine (PEPCID) tablet 20 mg  Daily      06/02/21 1313    06/03/21 0600  Basic Metabolic Panel  Daily      06/02/21 0846    06/02/21 2100  pravastatin (PRAVACHOL) tablet 10 mg  Nightly      06/02/21 0309    06/02/21 0900  dorzolamide (TRUSOPT) 2 % ophthalmic solution 1 drop  2 Times Daily      06/02/21 0309    06/02/21 0900  brimonidine (ALPHAGAN) 0.2 % ophthalmic solution 1 drop  3 Times Daily      06/02/21 0309    06/02/21 0900  aspirin EC tablet 81 mg  Daily      06/02/21 0309    06/02/21 0900  fluticasone (FLONASE) 50 MCG/ACT nasal spray 2 spray  2 Times Daily      06/02/21 0309    06/02/21 0900  timolol (TIMOPTIC) 0.5 % ophthalmic solution 1 drop  Daily      06/02/21 0309    06/02/21 0900  cholecalciferol (VITAMIN D3) tablet 2,000 Units  Daily      06/02/21 0309    06/02/21 0900  sertraline (ZOLOFT) tablet 25 mg  Daily      06/02/21 0309    06/02/21 0900  lactobacillus acidophilus (RISAQUAD) capsule 1 capsule  Daily      06/02/21 0309    06/02/21 0900  atropine 1 % ophthalmic solution 1 drop  Daily      06/02/21 0309    06/02/21 0900  sodium chloride 0.9 % flush 10 mL  Every 12 Hours Scheduled      06/02/21 0309    06/02/21 0900  enoxaparin (LOVENOX) syringe 30 mg  Every 24 Hours Scheduled      06/02/21 0309    06/02/21 0900  piperacillin-tazobactam (ZOSYN) 3.375 g in iso-osmotic dextrose 50 ml (premix)  Every 12 Hours       06/02/21 0337    06/02/21 0800  Ferric Citrate tablet 840 mg  3 Times Daily With Meals      06/02/21 0309    06/02/21 0730  insulin lispro (humaLOG) injection 2-7 Units  3 Times Daily Before Meals      06/02/21 0309    06/02/21 0700  POC Glucose 4x Daily AC & at Bedtime  4 Times Daily Before Meals & at Bedtime     Comments: If bedtime blood glucose is greater than 350 mg/dl, call MD.      06/02/21 0309    06/02/21 0600  metoclopramide (REGLAN) tablet 5 mg  4 Times Daily Before Meals & Nightly      06/02/21 0309    06/02/21 0400  Intake and Output  Every Hour      06/02/21 0309    06/02/21 0400  Neuro Checks  Every 4 Hours      06/02/21 0309    06/02/21 0311  Daily Weights  Daily      06/02/21 0309    06/02/21 0308  Pharmacy to Dose Zosyn  Continuous PRN      06/02/21 0309    06/02/21 0308  acetaminophen (TYLENOL) tablet 650 mg  Every 4 Hours PRN      06/02/21 0309    06/02/21 0308  acetaminophen (TYLENOL) suppository 650 mg  Every 4 Hours PRN      06/02/21 0309    06/02/21 0305  sodium chloride 0.9 % flush 10 mL  As Needed      06/02/21 0309    06/02/21 0305  dextrose (GLUTOSE) oral gel 15 g  Every 15 Minutes PRN      06/02/21 0309    06/02/21 0305  dextrose (D50W) 25 g/ 50mL Intravenous Solution 25 g  Every 15 Minutes PRN      06/02/21 0309    06/02/21 0305  glucagon (human recombinant) (GLUCAGEN DIAGNOSTIC) injection 1 mg  Every 15 Minutes PRN      06/02/21 0309    06/02/21 0301  ondansetron (ZOFRAN) tablet 4 mg  Every 6 Hours PRN      06/02/21 0309    06/02/21 0301  ipratropium-albuterol (DUO-NEB) nebulizer solution 3 mL  Every 4 Hours PRN      06/02/21 0309    --  traMADol (ULTRAM) 50 MG tablet  Every 6 Hours PRN      06/01/21 1657    --  Travoprost (TRAVATAN OP)      06/01/21 1657    --  SCANNED EKG      06/01/21 0000    --  SCANNED - TELEMETRY        06/01/21 0000    --  Sucroferric Oxyhydroxide (Velphoro) 500 MG chewable tablet      06/02/21 1610    --  oxyCODONE-acetaminophen (PERCOCET) 7.5-325 MG per  tablet  Every 8 Hours PRN      06/02/21 1613    --  SCANNED - TELEMETRY        06/01/21 0000    Signed and Held  albumin human 25 % IV SOLN 12.5 g  As Needed      Signed and Held    --  SCANNED - TELEMETRY        06/01/21 0000    --  SCANNED - TELEMETRY        06/01/21 0000    --  SCANNED - TELEMETRY        06/01/21 0000                   Physician Progress Notes (last 24 hours) (Notes from 06/06/21 0940 through 06/07/21 0940)      Jose Caballero MD at 06/06/21 1420          Nephrology ([facility]) Progress Note      Patient:  Mini Gentile  YOB: 1960  Date of Service: 6/6/2021  MRN: [id number]   Acct: [id number]   Primary Care Physician: Bharati Dahl APRN  Advance Directive:   Code Status and Medical Interventions:   Ordered at: 06/02/21 0310     Code Status:    CPR     Medical Interventions (Level of Support Prior to Arrest):    Full     Admit Date: 6/1/2021       Hospital Day: 5  Referring Provider: Tejinder Kent MD      Patient personally seen and examined.  Complete chart including Consults, Notes, Operative Reports, Labs, Cardiology, and Radiology studies reviewed as able.    Chief complaint: End-stage renal disease/altered mental status..    Subjective:  Mini Gentile is a 61 y.o. female  whom we were consulted for end stage renal disease. Maintenance hemodialysis on Tuesday Thursday Saturday at [facility]. Fairly noncompliant with dialysis treatments but did attend on 6/01.  Discharged two weeks ago from [facility] following fistulogram/venoplasty of left extremity on 5/13.  Presented this time with altered mental status. She was hypoglycemic, treated with IV dextrose, and was also given Narcan.  Has been tolerating HD treatments well.    This afternoon she is sitting up and eating lunch.  She feels well.  She is scheduled to go to a nursing home for short-term physical therapy.    Allergies:  Bupropion, Chantix [varenicline], Gabapentin,  Azithromycin, Levofloxacin, Penicillins, and Sulfa antibiotics    Home Meds:  Medications Prior to Admission   Medication Sig Dispense Refill Last Dose   • albuterol sulfate  (90 Base) MCG/ACT inhaler Inhale 2 puffs Every 4 (Four) Hours As Needed for Wheezing. (Patient taking differently: Inhale 2 puffs Every 6 (Six) Hours As Needed for Wheezing.) 18 g 0 Past Month at Unknown time   • aspirin 81 MG EC tablet Take 81 mg by mouth Daily.   6/1/2021 at Unknown time   • atropine 1 % ophthalmic solution Administer 1 drop into the left eye Daily.   6/1/2021 at Unknown time   • brimonidine (ALPHAGAN) 0.2 % ophthalmic solution Administer 1 drop into the left eye 2 (Two) Times a Day As Needed.   6/1/2021 at Unknown time   • carvedilol (COREG) 6.25 MG tablet Take 6.25 mg by mouth 2 (Two) Times a Day With Meals.   6/1/2021 at Unknown time   • cholecalciferol (VITAMIN D3) 1000 units tablet Take 2,000 Units by mouth Daily.   6/1/2021 at Unknown time   • dorzolamide (TRUSOPT) 2 % ophthalmic solution Administer 1 drop into the left eye 2 (Two) Times a Day.   6/1/2021 at Unknown time   • fluticasone (FLONASE) 50 MCG/ACT nasal spray 2 sprays into the nostril(s) as directed by provider 2 (Two) Times a Day.   Past Week at Unknown time   • insulin glargine (LANTUS) 100 UNIT/ML injection Inject 10 Units under the skin Every Night.   6/1/2021 at Unknown time   • ipratropium-albuterol (DUO-NEB) 0.5-2.5 mg/3 ml nebulizer Take 3 mL by nebulization Every 4 (Four) Hours As Needed for Wheezing or Shortness of Air.   Past Month at Unknown time   • losartan (COZAAR) 50 MG tablet Take 50 mg by mouth Daily. Pt was decreased to one daily.   6/1/2021 at Unknown time   • oxyCODONE-acetaminophen (PERCOCET) 7.5-325 MG per tablet Take 1 tablet by mouth Every 8 (Eight) Hours As Needed. Pt has one pill left. Last filled 5/21/21 6/1/2021 at Unknown time   • pantoprazole (PROTONIX) 40 MG EC tablet Take 1 tablet by mouth Daily. 30 tablet 1 6/1/2021  at Unknown time   • pregabalin (LYRICA) 50 MG capsule Take 50 mg by mouth 2 (Two) Times a Day.   6/1/2021 at Unknown time   • traMADol (ULTRAM) 50 MG tablet Take 50 mg by mouth Every 6 (Six) Hours As Needed for Moderate Pain . Pt takes one daily.   6/1/2021 at Unknown time   • Travoprost (TRAVATAN OP) Apply  to eye(s) as directed by provider.   5/31/2021   • pravastatin (PRAVACHOL) 10 MG tablet Take 10 mg by mouth Every Night.   5/31/2021   • Sucroferric Oxyhydroxide (Velphoro) 500 MG chewable tablet Chew 1 tablet. Takes with Dialysis   Unknown at Unknown time       Medicines:  Current Facility-Administered Medications   Medication Dose Route Frequency Provider Last Rate Last Admin   • acetaminophen (TYLENOL) tablet 650 mg  650 mg Oral Q4H PRN Bin Guy DO   650 mg at 06/05/21 2134    Or   • acetaminophen (TYLENOL) suppository 650 mg  650 mg Rectal Q4H PRN Bin Guy DO       • aspirin EC tablet 81 mg  81 mg Oral Daily Bin Guy DO   81 mg at 06/06/21 0830   • atropine 1 % ophthalmic solution 1 drop  1 drop Left Eye Daily Bin Guy DO   1 drop at 06/06/21 0835   • brimonidine (ALPHAGAN) 0.2 % ophthalmic solution 1 drop  1 drop Left Eye TID Bin Guy DO   1 drop at 06/06/21 0838   • cholecalciferol (VITAMIN D3) tablet 2,000 Units  2,000 Units Oral Daily Bin Guy DO   2,000 Units at 06/06/21 0830   • dextrose (D50W) 25 g/ 50mL Intravenous Solution 25 g  25 g Intravenous Q15 Min PRN Bin Guy DO       • dextrose (GLUTOSE) oral gel 15 g  15 g Oral Q15 Min PRN Bin Guy DO       • dorzolamide (TRUSOPT) 2 % ophthalmic solution 1 drop  1 drop Left Eye BID Bin Guy DO   1 drop at 06/06/21 0836   • enoxaparin (LOVENOX) syringe 30 mg  30 mg Subcutaneous Q24H Bin Guy DO   30 mg at 06/06/21 0832   • epoetin vika-epbx (RETACRIT) injection 10,000 Units  10,000 Units Subcutaneous Once per day on Mon Wed Fri Jose Caballero MD   10,000 Units at  06/04/21 1836   • famotidine (PEPCID) tablet 20 mg  20 mg Oral Daily Bin Guy DO   20 mg at 06/06/21 0830   • Ferric Citrate tablet 840 mg  4 tablet Oral TID With Meals Bin Guy DO   840 mg at 06/06/21 1128   • fluticasone (FLONASE) 50 MCG/ACT nasal spray 2 spray  2 spray Nasal BID Bin Guy DO   2 spray at 06/06/21 0832   • glucagon (human recombinant) (GLUCAGEN DIAGNOSTIC) injection 1 mg  1 mg Subcutaneous Q15 Min PRN Bin Guy DO       • insulin lispro (humaLOG) injection 2-7 Units  2-7 Units Subcutaneous TID AC Bin Guy DO   3 Units at 06/05/21 1728   • ipratropium-albuterol (DUO-NEB) nebulizer solution 3 mL  3 mL Nebulization Q4H PRN Bin Guy DO       • lactobacillus acidophilus (RISAQUAD) capsule 1 capsule  1 capsule Oral Daily Bin Guy DO   1 capsule at 06/05/21 1406   • loperamide (IMODIUM) capsule 4 mg  4 mg Oral Once Bin Guy DO       • metoclopramide (REGLAN) tablet 5 mg  5 mg Oral 4x Daily AC & at Bedtime Bin Guy DO   5 mg at 06/06/21 0632   • ondansetron (ZOFRAN) tablet 4 mg  4 mg Oral Q6H PRN Bin Guy DO       • Pharmacy to Dose Zosyn   Does not apply Continuous PRN Bin Guy DO       • piperacillin-tazobactam (ZOSYN) 3.375 g in iso-osmotic dextrose 50 ml (premix)  3.375 g Intravenous Q12H Bin Guy DO   3.375 g at 06/06/21 0948   • pravastatin (PRAVACHOL) tablet 10 mg  10 mg Oral Nightly Bin Guy DO   10 mg at 06/05/21 2119   • sertraline (ZOLOFT) tablet 25 mg  25 mg Oral Daily Bin Guy DO   25 mg at 06/06/21 0830   • sodium chloride 0.9 % bolus 100 mL  100 mL Intravenous PRN Jose Caballero MD       • sodium chloride 0.9 % flush 10 mL  10 mL Intravenous Q12H Bin Guy DO   10 mL at 06/06/21 0832   • sodium chloride 0.9 % flush 10 mL  10 mL Intravenous PRN Bin Guy DO       • timolol (TIMOPTIC) 0.5 % ophthalmic solution 1 drop  1 drop Left Eye Daily Bni Guy  F, DO   1 drop at 21 0836       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    • Chronic otitis media    • Diabetes (CMS/[facility])    • End stage renal disease on dialysis (CMS/[facility])    • Eustachian tube dysfunction    • GERD (gastroesophageal reflux disease)    • Glaucoma    • HTN (hypertension)    • Hyperlipidemia    • Mucoid otitis media    • Noncompliance of patient with renal dialysis (CMS/[facility])    • Tobacco abuse        Past Surgical History:  Past Surgical History:   Procedure Laterality Date   • ARTERIOVENOUS FISTULA     • CATARACT EXTRACTION WITH INTRAOCULAR LENS IMPLANT     •  SECTION     • CHOLECYSTECTOMY     • MYRINGOTOMY W/ TUBES Bilateral    • MYRINGOTOMY W/ TUBES Right    • TUBAL ABDOMINAL LIGATION         Family History  Family History   Problem Relation Age of Onset   • Heart disease Mother    • Diabetes Father    • Colon cancer Neg Hx    • Colon polyps Neg Hx        Social History  Social History     Socioeconomic History   • Marital status: Single     Spouse name: Not on file   • Number of children: Not on file   • Years of education: Not on file   • Highest education level: Not on file   Tobacco Use   • Smoking status: Current Every Day Smoker     Packs/day: 2.00     Years: 6.00     Pack years: 12.00     Types: Cigarettes   • Smokeless tobacco: Never Used   Vaping Use   • Vaping Use: Never used   Substance and Sexual Activity   • Alcohol use: No   • Drug use: No   • Sexual activity: Defer         Review of Systems:  General: Weak  Cardiac: Denies any chest pain or chest pressure  Pulmonary: Denies any shortness of breath  Gastrointestinal denies any nausea and vomiting  : Patient is currently anuric    Objective:  Patient Vitals for the past 24 hrs:   BP Temp Temp src Pulse Resp SpO2   21 1127 104/63 97.8 °F (36.6 °C) Oral 100 16 94 %   21 0736 107/62 97.6 °F (36.4 °C) Oral 78 16 98 %   21 0310 104/65  98.3 °F (36.8 °C) Oral 75 16 95 %   06/06/21 0004 105/62 97.9 °F (36.6 °C) Oral 78 18 98 %   06/05/21 1952 142/89 97.6 °F (36.4 °C) Oral 89 18 99 %   06/05/21 1437 126/94 97.5 °F (36.4 °C) Oral 88 18 93 %       Intake/Output Summary (Last 24 hours) at 6/6/2021 1420  Last data filed at 6/5/2021 2116  Gross per 24 hour   Intake 290 ml   Output --   Net 290 ml     General: awake, oriented X 1   Neck: supple, no JVD  Chest:  clear to auscultation bilaterally without respiratory distress  CVS: regular rate and rhythm  Abdominal: soft, nontender, positive bowel sounds  Extremities: no cyanosis or edema  Skin: warm and dry without rash  Neuro: no focal motor deficits    Labs:  Results from last 7 days   Lab Units 06/06/21  0453 06/04/21  0449 06/03/21  0239   WBC 10*3/mm3 3.53 2.79* 3.04*   HEMOGLOBIN g/dL 10.0* 10.2* 9.5*   HEMATOCRIT % 31.3* 31.2* 30.4*   PLATELETS 10*3/mm3 118* 146 174         Results from last 7 days   Lab Units 06/06/21  0453 06/05/21  0551 06/04/21  0449 06/02/21  0318 06/01/21  1910 06/01/21  1759   SODIUM mmol/L 134* 135* 132* 131* 131* 133*   POTASSIUM mmol/L 3.1* 4.0 4.3 3.6 3.6 3.7   CHLORIDE mmol/L 94* 95* 97* 92* 89* 91*   CO2 mmol/L 26.0 24.0 19.0* 29.0 26.0 28.0   BUN mg/dL 14 21 15 25* 24* 23   CREATININE mg/dL 2.93* 3.74* 2.98* 3.90* 3.42* 3.30*   CALCIUM mg/dL 9.6 9.7 9.0 8.3* 9.1 9.1   BILIRUBIN mg/dL  --   --   --  0.3 0.4 0.4   ALK PHOS U/L  --   --   --  206* 260* 263*   ALT (SGPT) U/L  --   --   --  14 17 17   AST (SGOT) U/L  --   --   --  17 21 20   GLUCOSE mg/dL 80 141* 104* 200* 144* 126*       Radiology:   Imaging Results (Last 72 Hours)     ** No results found for the last 72 hours. **          Culture:  Blood Culture   Date Value Ref Range Status   06/01/2021 No growth at 24 hours  Preliminary   06/01/2021 Abnormal Stain (C)  Preliminary         Assessment   1.  End stage renal disease/on maintenance hemodialysis.  2.  Metabolic encephalopathy  3.  Sepsis  4.  Type 2 diabetes  with hypoglycemia  5.  Hypertension  6.  Anemia of CKD  7.    Hypokalemia.  7.  Hyponatremia     Plan:  1.  Discharge to nursing home soon.  2.  Plan was discussed with her  at the bedside.      Jose Caballero MD  6/6/2021  14:20 CDT    Electronically signed by Jose Caballero MD at 06/06/21 1422     Malena Bin CLARITA,  at 06/06/21 1107              [facility]  INPATIENT PROGRESS NOTE    Patient Name: Mini Gentile  Date of Admission: 6/1/2021  Today's Date: 06/06/21  Length of Stay: 5  Primary Care Physician: Bharati Dahl APRN    Subjective   Chief Complaint: Altered mental status    HPI:  Patient seen with her  at bedside.  This is most awake alert interactive she has been on arrival.  She says she feels a lot better.  Denies any shortness of breath or cough.  Eating well.  Only issue at this time is ongoing loose stools.  She has no abdominal pain or cramping.  No fevers.  No weight elevation.  She is asking for some Imodium.  Discussed with her that she has a probiotic which she keeps refusing and she says she will take it.   states she essentially is back to baseline.      Review of Systems   All pertinent negatives and positives are as above. All other systems have been reviewed and are negative unless otherwise stated.     Objective    Temp:  [97.5 °F (36.4 °C)-98.3 °F (36.8 °C)] 97.6 °F (36.4 °C)  Heart Rate:  [75-89] 78  Resp:  [16-18] 16  BP: (104-142)/(62-94) 107/62  Physical Exam  GEN: Awake and interactive, following commands, no acute distress  HEENT:  PERRLA, Anicteric, Trachea midline  Lungs: coarse bs, no wheezing  Heart: RRR, +S1/s2, no rub  ABD: soft, nt/nd, +BS, no guarding/rebound  Extremities:  no cyanosis, no edema  Skin: no rashes or petechiae   Neuro: AAOx2-3, MONSALVE, good  b/l, no obvious focal deficits  Psych: flat        Results Review:  I have reviewed the labs, radiology results, and diagnostic  studies.    Laboratory Data:   Results from last 7 days   Lab Units 06/06/21  0453 06/04/21  0449 06/03/21  0239   WBC 10*3/mm3 3.53 2.79* 3.04*   HEMOGLOBIN g/dL 10.0* 10.2* 9.5*   HEMATOCRIT % 31.3* 31.2* 30.4*   PLATELETS 10*3/mm3 118* 146 174        Results from last 7 days   Lab Units 06/06/21  0453 06/05/21  0551 06/04/21 0449 06/02/21  0318 06/01/21  1910 06/01/21  1759   SODIUM mmol/L 134* 135* 132* 131* 131* 133*   POTASSIUM mmol/L 3.1* 4.0 4.3 3.6 3.6 3.7   CHLORIDE mmol/L 94* 95* 97* 92* 89* 91*   CO2 mmol/L 26.0 24.0 19.0* 29.0 26.0 28.0   BUN mg/dL 14 21 15 25* 24* 23   CREATININE mg/dL 2.93* 3.74* 2.98* 3.90* 3.42* 3.30*   CALCIUM mg/dL 9.6 9.7 9.0 8.3* 9.1 9.1   BILIRUBIN mg/dL  --   --   --  0.3 0.4 0.4   ALK PHOS U/L  --   --   --  206* 260* 263*   ALT (SGPT) U/L  --   --   --  14 17 17   AST (SGOT) U/L  --   --   --  17 21 20   GLUCOSE mg/dL 80 141* 104* 200* 144* 126*       Culture Data:   No results found for: BLOODCX, URINECX, WOUNDCX, MRSACX, RESPCX, STOOLCX    Radiology Data:   Imaging Results (Last 24 Hours)     ** No results found for the last 24 hours. **          I have reviewed the patient's current medications.     Assessment/Plan     Active Hospital Problems    Diagnosis    • **Altered mental status    • Hypoglycemia    • Aspiration into airway    • End stage renal failure on dialysis (CMS/[facility])    • Diabetes (CMS/[facility])    • HTN (hypertension)        #1 altered mental status/encephalopathy -unclear etiology.  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 She was also hypoglycemic on arrival.  Some reports that she responded to Narcan.  She has evidence for aspiration.  Currently being covered with Zosyn.  Head CT was nonacute on arrival.  Her mental status continues to improve with supportive care and treatment.  Continue to monitor.  Intact neuro exam. Ok to d/c vanco.     #2 aspiration -patient was recently  hospitalized for aspiration pneumonia after a procedure when she vomited.  CAT scan on arrival here does show some debris in her bronchus.  Currently altered.  Keep NPO.  Will need a speech evaluation when she wakes up.  Covering with Zosyn. She hs been on for 5 days now. Afebrile. No white count. Room air. Will d/c zosyn given GI issue. Monitor    #3 ESRD - nephrology following for dialysis needs    #4 DM 2 -continue sliding scale insulin and hypoglycemia protocol    #5 essential hypertension -blood pressure more stable today despite none of her home BP meds.  Continue monitor.    #6 right humeral fracture -this occurred all the way back in March.  She was told to follow-up with orthopedics after discharge.      #7 diarrhea - no abd pain, no wbc elevation, no fever. Will give Imodium x1 and monitor.  Discussed her probiotic    Patient previously was recommended for SNF but declined.   has called and states she is too weak and debilitated he cannot take care of at home.  We will plan for SNF placement at discharge.      Discharge Planning: I expect the patient to be discharged to SNF when medically ready.  Precert pending    Electronically signed by Bin Guy DO, 06/06/21, 11:07 CDT.      Electronically signed by Bin Guy DO at 06/06/21 1401       Consult Notes (last 24 hours) (Notes from 06/06/21 0940 through 06/07/21 0940)    No notes of this type exist for this encounter.

## 2021-06-07 NOTE — PLAN OF CARE
Goal Outcome Evaluation:  Plan of Care Reviewed With: patient  Progress: no change  Outcome Summary: A & O but forgetful at times, VSS, up in chair with chair alarm set, dialysis walter barnard, sat, patient pulled at IJ tubing and pulled trough dressing, xray done to verify placement but hasn't been read yet, stitches still in place, dressing changed with April RN, orange caps in place

## 2021-06-07 NOTE — PLAN OF CARE
Problem: Adult Inpatient Plan of Care  Goal: Plan of Care Review  Outcome: Ongoing, Progressing  Flowsheets (Taken 6/7/2021 1000)  Progress: improving  Plan of Care Reviewed With: patient  Outcome Summary: SLP dysphagia tx completed this AM. Upright in bedside chair at time of arrival, denied pain. Pt was alert, cooperative. She denied dysphagia with current diet of regular solid diet consistency with regular/thin liquid consistency with the exception of beef from beef roast. She denied coughing, throat clearing with PO. She was agreeable to PO trials of thin liquids via cup and regular solids. Pt self fed appropriately, presented with adequate mastication of solid without oral residue. She consumed multiple thin liquid trials without concerns with oral prep, transit, or laryngeal elevation. No overt s/s of aspiration. Cannot fully r/o aspiration at this time, yet feel oropharyngeal swallow fx is WNL at this time. For this reason, continued SLP services for dysphagia are not warranted at this time. MD to reconsult for dysphagia if changes or new concerns arise. Pt reported recent difficulty (duration of approximately one month) of memory difficulty, specifically numbers. She reported that, for this reason, she has turned over management of her finances to her significant other of 22 years. If pt is discharged to SNF this date, pt would benefit from SLP cognitive eval, otherwise, ST will plan to acutely eval cognitive fx on 06/08/2021 as able. Thanks!

## 2021-06-07 NOTE — PROGRESS NOTES
Nephrology ([facility]) Progress Note      Patient:  Mini Gentile  YOB: 1960  Date of Service: 6/7/2021  MRN: [id number]   Acct: [id number]   Primary Care Physician: Bharati Dahl APRN  Advance Directive:   Code Status and Medical Interventions:   Ordered at: 06/02/21 0310     Code Status:    CPR     Medical Interventions (Level of Support Prior to Arrest):    Full     Admit Date: 6/1/2021       Hospital Day: 6  Referring Provider: Tejinder Kent MD      Patient personally seen and examined.  Complete chart including Consults, Notes, Operative Reports, Labs, Cardiology, and Radiology studies reviewed as able.        Subjective:  Mini Gentile is a 61 y.o. female  whom we were consulted for end stage renal disease. Maintenance hemodialysis on Tuesday Thursday Saturday at [facility]. Fairly noncompliant with dialysis treatments but did attend on 6/01.  Discharged two weeks ago from [facility] following fistulogram/venoplasty of left extremity on 5/13.  Presented this time with altered mental status. She was hypoglycemic, treated with IV dextrose, and was also given Narcan.  Has been tolerating HD treatments well.    Today is awake, oriented.  Intermittent confusion continues. Pulled off permcath dressing last night, line itself was unaffected    Allergies:  Bupropion, Chantix [varenicline], Gabapentin, Azithromycin, Levofloxacin, Penicillins, and Sulfa antibiotics    Home Meds:  Medications Prior to Admission   Medication Sig Dispense Refill Last Dose   • albuterol sulfate  (90 Base) MCG/ACT inhaler Inhale 2 puffs Every 4 (Four) Hours As Needed for Wheezing. (Patient taking differently: Inhale 2 puffs Every 6 (Six) Hours As Needed for Wheezing.) 18 g 0 Past Month at Unknown time   • aspirin 81 MG EC tablet Take 81 mg by mouth Daily.   6/1/2021 at Unknown time   • atropine 1 % ophthalmic solution Administer 1 drop into the left eye Daily.    6/1/2021 at Unknown time   • brimonidine (ALPHAGAN) 0.2 % ophthalmic solution Administer 1 drop into the left eye 2 (Two) Times a Day As Needed.   6/1/2021 at Unknown time   • carvedilol (COREG) 6.25 MG tablet Take 6.25 mg by mouth 2 (Two) Times a Day With Meals.   6/1/2021 at Unknown time   • cholecalciferol (VITAMIN D3) 1000 units tablet Take 2,000 Units by mouth Daily.   6/1/2021 at Unknown time   • dorzolamide (TRUSOPT) 2 % ophthalmic solution Administer 1 drop into the left eye 2 (Two) Times a Day.   6/1/2021 at Unknown time   • fluticasone (FLONASE) 50 MCG/ACT nasal spray 2 sprays into the nostril(s) as directed by provider 2 (Two) Times a Day.   Past Week at Unknown time   • insulin glargine (LANTUS) 100 UNIT/ML injection Inject 10 Units under the skin Every Night.   6/1/2021 at Unknown time   • ipratropium-albuterol (DUO-NEB) 0.5-2.5 mg/3 ml nebulizer Take 3 mL by nebulization Every 4 (Four) Hours As Needed for Wheezing or Shortness of Air.   Past Month at Unknown time   • losartan (COZAAR) 50 MG tablet Take 50 mg by mouth Daily. Pt was decreased to one daily.   6/1/2021 at Unknown time   • oxyCODONE-acetaminophen (PERCOCET) 7.5-325 MG per tablet Take 1 tablet by mouth Every 8 (Eight) Hours As Needed. Pt has one pill left. Last filled 5/21/21 6/1/2021 at Unknown time   • pantoprazole (PROTONIX) 40 MG EC tablet Take 1 tablet by mouth Daily. 30 tablet 1 6/1/2021 at Unknown time   • pregabalin (LYRICA) 50 MG capsule Take 50 mg by mouth 2 (Two) Times a Day.   6/1/2021 at Unknown time   • traMADol (ULTRAM) 50 MG tablet Take 50 mg by mouth Every 6 (Six) Hours As Needed for Moderate Pain . Pt takes one daily.   6/1/2021 at Unknown time   • Travoprost (TRAVATAN OP) Apply  to eye(s) as directed by provider.   5/31/2021   • pravastatin (PRAVACHOL) 10 MG tablet Take 10 mg by mouth Every Night.   5/31/2021   • Sucroferric Oxyhydroxide (Velphoro) 500 MG chewable tablet Chew 1 tablet. Takes with Dialysis   Unknown at  Unknown time       Medicines:  Current Facility-Administered Medications   Medication Dose Route Frequency Provider Last Rate Last Admin   • acetaminophen (TYLENOL) tablet 650 mg  650 mg Oral Q4H PRN Bin Guy DO   650 mg at 06/06/21 1654    Or   • acetaminophen (TYLENOL) suppository 650 mg  650 mg Rectal Q4H PRN Bin Guy DO       • aspirin EC tablet 81 mg  81 mg Oral Daily Bin Guy DO   81 mg at 06/07/21 0925   • atropine 1 % ophthalmic solution 1 drop  1 drop Left Eye Daily Bin Guy DO   1 drop at 06/07/21 0924   • brimonidine (ALPHAGAN) 0.2 % ophthalmic solution 1 drop  1 drop Left Eye TID Bin Guy DO   1 drop at 06/07/21 0924   • cholecalciferol (VITAMIN D3) tablet 2,000 Units  2,000 Units Oral Daily Bin Guy DO   2,000 Units at 06/07/21 0925   • dextrose (D50W) 25 g/ 50mL Intravenous Solution 25 g  25 g Intravenous Q15 Min PRN Bin Guy DO       • dextrose (GLUTOSE) oral gel 15 g  15 g Oral Q15 Min PRN Bin Guy DO       • dorzolamide (TRUSOPT) 2 % ophthalmic solution 1 drop  1 drop Left Eye BID Bin Guy DO   1 drop at 06/07/21 0924   • enoxaparin (LOVENOX) syringe 30 mg  30 mg Subcutaneous Q24H Bin Guy DO   30 mg at 06/07/21 0924   • epoetin vika-epbx (RETACRIT) injection 10,000 Units  10,000 Units Subcutaneous Once per day on Mon Wed Fri Jose Caballero MD   10,000 Units at 06/04/21 1836   • famotidine (PEPCID) tablet 20 mg  20 mg Oral Daily Bin Guy DO   20 mg at 06/07/21 0925   • Ferric Citrate tablet 840 mg  4 tablet Oral TID With Meals Bin Guy DO   840 mg at 06/07/21 0924   • fluticasone (FLONASE) 50 MCG/ACT nasal spray 2 spray  2 spray Nasal BID Bin Guy DO   2 spray at 06/07/21 0923   • glucagon (human recombinant) (GLUCAGEN DIAGNOSTIC) injection 1 mg  1 mg Subcutaneous Q15 Min PRN Bin Guy, DO       • insulin lispro (humaLOG) injection 2-7 Units  2-7 Units Subcutaneous TID AC Malena  Bin OTTO DO   2 Units at 06/07/21 0923   • ipratropium-albuterol (DUO-NEB) nebulizer solution 3 mL  3 mL Nebulization Q4H PRN Bin Guy DO       • lactobacillus acidophilus (RISAQUAD) capsule 1 capsule  1 capsule Oral Daily Bin Guy DO   1 capsule at 06/07/21 0924   • metoclopramide (REGLAN) tablet 5 mg  5 mg Oral 4x Daily AC & at Bedtime Bin Guy DO   5 mg at 06/07/21 0551   • ondansetron (ZOFRAN) tablet 4 mg  4 mg Oral Q6H PRN Bin Guy DO       • Pharmacy to Dose Zosyn   Does not apply Continuous PRN Bin Guy DO       • piperacillin-tazobactam (ZOSYN) 3.375 g in iso-osmotic dextrose 50 ml (premix)  3.375 g Intravenous Q12H Bin Guy DO   3.375 g at 06/07/21 0925   • pravastatin (PRAVACHOL) tablet 10 mg  10 mg Oral Nightly Bin Guy DO   10 mg at 06/06/21 2128   • sertraline (ZOLOFT) tablet 25 mg  25 mg Oral Daily Bin Guy DO   25 mg at 06/07/21 0924   • sodium chloride 0.9 % bolus 100 mL  100 mL Intravenous PRN Jose Caballero MD       • sodium chloride 0.9 % flush 10 mL  10 mL Intravenous Q12H Bin Guy DO   10 mL at 06/07/21 0927   • sodium chloride 0.9 % flush 10 mL  10 mL Intravenous PRN Bin Guy DO       • timolol (TIMOPTIC) 0.5 % ophthalmic solution 1 drop  1 drop Left Eye Daily Bin Guy DO   1 drop at 06/07/21 0924       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    • Chronic otitis media    • Diabetes (CMS/[facility])    • End stage renal disease on dialysis (CMS/[facility])    • Eustachian tube dysfunction    • GERD (gastroesophageal reflux disease)    • Glaucoma    • HTN (hypertension)    • Hyperlipidemia    • Mucoid otitis media    • Noncompliance of patient with renal dialysis (CMS/[facility])    • Tobacco abuse        Past Surgical History:  Past Surgical History:   Procedure Laterality Date   • ARTERIOVENOUS FISTULA     • CATARACT EXTRACTION WITH INTRAOCULAR  LENS IMPLANT     •  SECTION     • CHOLECYSTECTOMY     • MYRINGOTOMY W/ TUBES Bilateral    • MYRINGOTOMY W/ TUBES Right    • TUBAL ABDOMINAL LIGATION         Family History  Family History   Problem Relation Age of Onset   • Heart disease Mother    • Diabetes Father    • Colon cancer Neg Hx    • Colon polyps Neg Hx        Social History  Social History     Socioeconomic History   • Marital status: Single     Spouse name: Not on file   • Number of children: Not on file   • Years of education: Not on file   • Highest education level: Not on file   Tobacco Use   • Smoking status: Current Every Day Smoker     Packs/day: 2.00     Years: 6.00     Pack years: 12.00     Types: Cigarettes   • Smokeless tobacco: Never Used   Vaping Use   • Vaping Use: Never used   Substance and Sexual Activity   • Alcohol use: No   • Drug use: No   • Sexual activity: Defer         Review of Systems:  Unable to obtain due to altered mentation    Objective:  Patient Vitals for the past 24 hrs:   BP Temp Temp src Pulse Resp SpO2 Weight   21 0700 156/78 98 °F (36.7 °C) Oral 82 18 98 % --   21 0416 150/78 97.8 °F (36.6 °C) Oral 80 18 100 % --   21 2325 117/87 97.6 °F (36.4 °C) Temporal 84 16 98 % --   21 1946 153/82 98.2 °F (36.8 °C) Oral 85 16 96 % 65.1 kg (143 lb 8 oz)   21 1127 104/63 97.8 °F (36.6 °C) Oral 100 16 94 % --       Intake/Output Summary (Last 24 hours) at 2021 0938  Last data filed at 2021 2129  Gross per 24 hour   Intake 50 ml   Output --   Net 50 ml     General: awake, oriented X 2   Neck: supple, no JVD  Chest:  clear to auscultation bilaterally without respiratory distress  CVS: regular rate and rhythm  Abdominal: soft, nontender, positive bowel sounds  Extremities: no cyanosis or edema  Skin: warm and dry without rash  Neuro: no focal motor deficits    Labs:  Results from last 7 days   Lab Units 21  0449 21  0453 21  0449   WBC 10*3/mm3 4.10 3.53 2.79*   HEMOGLOBIN  g/dL 10.4* 10.0* 10.2*   HEMATOCRIT % 33.0* 31.3* 31.2*   PLATELETS 10*3/mm3 107* 118* 146         Results from last 7 days   Lab Units 06/07/21  0449 06/06/21  0453 06/05/21  0551 06/02/21  0318 06/01/21  1910 06/01/21  1759   SODIUM mmol/L 130* 134* 135* 131* 131* 133*   POTASSIUM mmol/L 3.7 3.1* 4.0 3.6 3.6 3.7   CHLORIDE mmol/L 90* 94* 95* 92* 89* 91*   CO2 mmol/L 24.0 26.0 24.0 29.0 26.0 28.0   BUN mg/dL 21 14 21 25* 24* 23   CREATININE mg/dL 4.02* 2.93* 3.74* 3.90* 3.42* 3.30*   CALCIUM mg/dL 9.8 9.6 9.7 8.3* 9.1 9.1   BILIRUBIN mg/dL  --   --   --  0.3 0.4 0.4   ALK PHOS U/L  --   --   --  206* 260* 263*   ALT (SGPT) U/L  --   --   --  14 17 17   AST (SGOT) U/L  --   --   --  17 21 20   GLUCOSE mg/dL 169* 80 141* 200* 144* 126*       Radiology:   Imaging Results (Last 72 Hours)     Procedure Component Value Units Date/Time    XR Chest 1 View [[id number]] Collected: 06/07/21 0709     Updated: 06/07/21 0714    Narrative:      EXAMINATION: XR CHEST 1 VW- 6/7/2021 7:09 AM CDT     HISTORY: central line placement verification; R41.82-Altered mental  status, unspecified; E16.2-Hypoglycemia, unspecified; J69.0-Pneumonitis  due to inhalation of food and vomit; R13.10-Dysphagia, unspecified;  Z78.9-Other specified health status; Z74.09-Other reduced mobility.     REPORT: A frontal view the chest was obtained.     COMPARISON: Chest x-ray 6/1/2021.     The tip of the right internal jugular central line remains in  satisfactory position at the proximal SVC. There is continued stable  consolidation of the right base with obscuration of the diaphragm and  right heart margin, with a moderate size right pleural effusion.  Underlying pneumonia and/or atelectasis are possibilities. The left lung  shows no focal infiltrates, there is mild interstitial prominence. Mild  central vascular crowding is noted. Heart size is normal. There is a  stent in the left subclavian region as before. No pneumothorax is  identified.        Impression:      Stable chest x-ray, the right IJ central line tip remains in  satisfactory position over the proximal SVC. Continued consolidation of  the right lung base with effusion and atelectasis, underlying pneumonia  is not excluded.  This report was finalized on 06/07/2021 07:11 by Dr. Colton Hampton MD.          Culture:  Blood Culture   Date Value Ref Range Status   06/01/2021 No growth at 24 hours  Preliminary   06/01/2021 Abnormal Stain (C)  Preliminary         Assessment   1.  End stage renal disease on HD TTS  2.  Metabolic encephalopathy  3.  Sepsis  4.  Type 2 diabetes with hypoglycemia  5.  Hypertension  6.  Anemia of CKD  7.  COPD  7.  Hyponatremia     Plan:  1.  Dialysis next due 6/08  2.  Monitor labs      Gomez Lemos, APRN  6/7/2021  09:39 CDT

## 2021-06-07 NOTE — THERAPY TREATMENT NOTE
Acute Care - Physical Therapy Treatment Note  [facility]     Patient Name: Mini Gentile  : 1960  MRN: [id number]  Today's Date: 2021           PT Assessment (last 12 hours)      PT Evaluation and Treatment     Row Name 21 1036          Physical Therapy Time and Intention    Subjective Information  complains of;pain R shoulder but did not rate   -WK     Document Type  therapy note (daily note)  -WK     Mode of Treatment  physical therapy  -WK     Row Name 21 1036          General Information    Existing Precautions/Restrictions  fall;non-weight bearing NWB RUE with sling   -WK     Barriers to Rehab  visual deficit  -WK     Row Name 21 1036          Bed Mobility    Comment (Bed Mobility)  in chair   -WK     Row Name 21 1036          Transfers    Sit-Stand [place] (Transfers)  verbal cues;minimum assist (75% patient effort) multiple cues for WB precautions   -WK     Stand-Sit [place] (Transfers)  verbal cues;minimum assist (75% patient effort)  -WK     Row Name 21 1036          Gait/Stairs (Locomotion)    [place] Level (Gait)  verbal cues;contact guard;minimum assist (75% patient effort)  -WK     Assistive Device (Gait)  -- pt asked to amb without it cane   -WK     Distance in Feet (Gait)  45, 30, 15 standing rest break due to fatigue.   -WK     Deviations/Abnormal Patterns (Gait)  gait speed decreased  -WK     Bilateral Gait Deviations  forward flexed posture  -WK     Comment (Gait/Stairs)  Pt was a little unsteady and required cues for safety and WB status   -WK     Row Name             Wound 21 014 coccyx    Wound - Properties Group Placement Date: 21  -VT Placement Time: 142 Location: coccyx  -VT    Retired Wound - Properties Group Date first assessed: 21  -VT Time first assessed: 142  -VT Location: coccyx  -VT    Row Name 21 1036          Plan of Care Review    Plan of Care Reviewed With  patient  -WK     Progress   improving  -WK     Outcome Summary  Pt amb 45', 30', 15' CGA-min a with standing rest break. Pt required cues for WB precautions and safety.   -WK     Row Name 06/07/21 1036          Positioning and Restraints    Pre-Treatment Position  in bed  -WK     Post Treatment Position  chair  -WK     In Chair  reclined;call light within reach;encouraged to call for assist;exit alarm on exit alarm indicated   -WK       User Key  (r) = Recorded By, (t) = Taken By, (c) = Cosigned By    Initials Name Provider Type    WK Carol Preston PTA Physical Therapy Assistant    VT Alyx Watson, RN Registered Nurse        Physical Therapy Education                 Title: PT OT SLP Therapies (In Progress)     Topic: Physical Therapy (In Progress)     Point: Mobility training (Done)     Learning Progress Summary           Patient Acceptance, E, VU,NR by  at 6/7/2021 1142    Comment: safety and WB    Acceptance, E,D, VU,NR by  at 6/6/2021 0805    Comment: gait training with straight cane    Acceptance, E, VU by  at 6/5/2021 0823    Comment: benefits of activity, NWB RUE, safety    Acceptance, E, NR by MS at 6/4/2021 1346    Comment: role of PT in her care                   Point: Home exercise program (Not Started)     Learner Progress:  Not documented in this visit.          Point: Body mechanics (Not Started)     Learner Progress:  Not documented in this visit.          Point: Precautions (Not Started)     Learner Progress:  Not documented in this visit.                      User Key     Initials Effective Dates Name Provider Type Discipline    MS 06/19/18 -  Maia Ovalle, PT, DPT, NCS Physical Therapist PT     12/08/16 -  Domingo Dill PTA Physical Therapy Assistant PT     08/02/16 -  Carol Preston PTA Physical Therapy Assistant PT              PT Recommendation and Plan     Plan of Care Reviewed With: patient  Progress: improving  Outcome Summary: Pt amb 45', 30', 15' CGA-min a with standing rest break.  Pt required cues for WB precautions and safety.   Outcome Measures     Row Name 06/05/21 1400             How much help from another is currently needed...    Putting on and taking off regular lower body clothing?  2  -TS      Bathing (including washing, rinsing, and drying)  3  -TS      Toileting (which includes using toilet bed pan or urinal)  3  -TS      Putting on and taking off regular upper body clothing  3  -TS      Taking care of personal grooming (such as brushing teeth)  3  -TS      Eating meals  4  -TS      AM-PAC 6 Clicks Score (OT)  18  -TS        User Key  (r) = Recorded By, (t) = Taken By, (c) = Cosigned By    Initials Name Provider Type    TS Bijal Lindquist COTA/L Occupational Therapy Assistant           Time Calculation:   PT Charges     Row Name 06/07/21 1142             Time Calculation    Start Time  1036  -WK      Stop Time  1100  -WK      Time Calculation (min)  24 min  -WK      PT Received On  06/07/21  -WK         Time Calculation- PT    Total Timed Code Minutes- PT  24 minute(s)  -WK        User Key  (r) = Recorded By, (t) = Taken By, (c) = Cosigned By    Initials Name Provider Type    WK Carol Preston PTA Physical Therapy Assistant        Therapy Charges for Today     Code Description Service Date Service Provider Modifiers Qty    88241131200 HC GAIT TRAINING EA 15 MIN 6/7/2021 Carol Preston PTA GP 2          PT G-Codes  Outcome Measure Options: AM-PAC 6 Clicks Basic Mobility (PT)  AM-PAC 6 Clicks Score (PT): 12  AM-PAC 6 Clicks Score (OT): 18    Carol Preston PTA  6/7/2021

## 2021-06-07 NOTE — PLAN OF CARE
Problem: Adult Inpatient Plan of Care  Goal: Plan of Care Review  Recent Flowsheet Documentation  Taken 6/7/2021 1036 by Carol Preston, PTA  Progress: improving  Plan of Care Reviewed With: patient  Outcome Summary: Pt amb 45', 30', 15' CGA-min a with standing rest break. Pt required cues for WB precautions and safety.

## 2021-06-07 NOTE — THERAPY TREATMENT NOTE
Acute Care - Speech Language Pathology   Swallow Treatment Note [facility]     Patient Name: Mini Gentile  : 1960  MRN: [id number]  Today's Date: 2021               Admit Date: 2021     SLP dysphagia tx completed this AM. Upright in bedside chair at time of arrival, denied pain. Pt was alert, cooperative. She denied dysphagia with current diet of regular solid diet consistency with regular/thin liquid consistency with the exception of beef from beef roast. She denied coughing, throat clearing with PO. She was agreeable to PO trials of thin liquids via cup and regular solids. Pt self fed appropriately, presented with adequate mastication of solid without oral residue. She consumed multiple thin liquid trials without concerns with oral prep, transit, or laryngeal elevation. No overt s/s of aspiration. Cannot fully r/o aspiration at this time, yet feel oropharyngeal swallow fx is WNL at this time. For this reason, continued SLP services for dysphagia are not warranted at this time. MD to reconsult for dysphagia if changes or new concerns arise. Pt reported recent difficulty (duration of approximately one month) of memory difficulty, specifically numbers. She reported that, for this reason, she has turned over management of her finances to her significant other of 22 years. If pt is discharged to SNF this date, pt would benefit from SLP cognitive eval, otherwise, ST will plan to acutely eval cognitive fx on 2021 as able. Thanks!  Colleen Jacinto, CCC-SLP 2021 10:26 CDT    Visit Dx:     ICD-10-CM ICD-9-CM   1. Altered mental status, unspecified altered mental status type  R41.82 780.97   2. Hypoglycemia  E16.2 251.2   3. Aspiration pneumonia, unspecified aspiration pneumonia type, unspecified laterality, unspecified part of lung (CMS/[facility])  J69.0 507.0   4. Dysphagia, unspecified type  R13.10 787.20   5. Decreased activities of daily living (ADL)  Z78.9 V49.89   6. 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   • Eustachian tube dysfunction   • Chronic otitis media   • Pneumonia of left upper lobe due to Streptococcus (CMS/[facility])   • End stage renal failure on dialysis (CMS/[facility])   • Diabetes (CMS/[facility])   • Glaucoma   • HTN (hypertension)   • Proteinuria   • Type 2 diabetes mellitus, with long-term current use of insulin (CMS/[facility])   • Chronic anemia   • Acute pulmonary edema (CMS/[facility])   • Non-compliance with renal dialysis (CMS/[facility])   • Elevated troponin due to ESRD    • Hyperkalemia   • Encephalopathy, metabolic, due to uremia   • High anion gap metabolic acidosis due to uremia   • Respiratory distress   • Acute diastolic heart failure (CMS/[facility])   • Lymph node enlargement, left axillary measuring 1.1 cm -follow-up for primary care   • Nausea vomiting and diarrhea   • Acute diastolic heart failure (CMS/[facility])   • Diabetes mellitus with ophthalmic complication (CMS/[facility])   • Tobacco abuse   • Urinary tract infection with hematuria   • Pneumonia due to infectious organism   • Abnormal urine findings   • Closed fracture of right proximal humerus   • Hyponatremia   • Hypothyroidism (acquired)   • Forehead laceration secondary to fall   • Heme positive stool   • Dialysis AV fistula malfunction, initial encounter (CMS/[facility])   • Aspiration into airway   • Bronchospasm, acute   • Altered mental status   • Protein-calorie malnutrition (CMS/[facility])   • Pupil irregular   • Sepsis (CMS/[facility])   • Hypoglycemia     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    • Chronic otitis media    • Diabetes (CMS/[facility])    • End stage renal disease on dialysis (CMS/[facility])    • Eustachian tube dysfunction    • GERD (gastroesophageal reflux disease)    • Glaucoma    • HTN (hypertension)    • Hyperlipidemia    • Mucoid otitis media    • Noncompliance of patient with renal dialysis (CMS/[facility])    • Tobacco abuse       Past Surgical History:   Procedure Laterality Date   • ARTERIOVENOUS FISTULA     • CATARACT EXTRACTION WITH INTRAOCULAR LENS IMPLANT     •  SECTION     • CHOLECYSTECTOMY     • MYRINGOTOMY W/ TUBES Bilateral    • MYRINGOTOMY W/ TUBES Right    • TUBAL ABDOMINAL LIGATION          SWALLOW EVALUATION (last 72 hours)      SLP Adult Swallow Evaluation     Row Name 21 1000 21 1033                Rehab Evaluation    Document Type  therapy note (daily note)  -TM  therapy note (daily note)  -MM       Subjective Information  no complaints  -TM  no complaints  -MM       Patient Observations  alert;cooperative  -TM  alert;cooperative;agree to therapy  -MM       Patient/Family/Caregiver Comments/Observations  Upright in bedside chair, no family present.  -TM  No family present.  -MM       Care Plan Review  care plan/treatment goals reviewed;risks/benefits reviewed;current/potential barriers reviewed  -TM  care plan/treatment goals reviewed  -MM       Care Plan Review, Other Participant(s)  --  other (see comments) JENNIFFER Best   -MM       Patient Effort  adequate  -TM  good  -MM          Pain    Additional Documentation  Pain Scale: Numbers Pre/Post-Treatment (Group)  -TM  Pain Scale: FACES Pre/Post-Treatment (Group)  -MM          Pain Scale: Numbers Pre/Post-Treatment    Pretreatment Pain Rating  0/10 - no pain  -TM  --       Posttreatment Pain Rating  0/10 - no pain  -TM  --          Pain Scale: FACES Pre/Post-Treatment    Pain: FACES Scale, Pretreatment  --  0-->no hurt  -MM       Posttreatment Pain Rating  --  0-->no hurt  -MM          Oral Motor Structure and Function    Dentition Assessment  upper dentures/partial in place;lower dentures/partial in place  -TM  --          Clinical Impression    Daily Summary of Progress (SLP)  --  progress toward functional goals as expected  -MM       Barriers to Overall Progress (SLP)  --  None  -MM       Plan for Continued Treatment (SLP)  --  Continue to follow   -MM           Swallow Goals (SLP)    Oral Nutrition/Hydration Goal Selection (SLP)  --  oral nutrition/hydration, SLP goal 1  -MM          Oral Nutrition/Hydration Goal 1 (SLP)    Oral Nutrition/Hydration Goal 1, SLP  Patient will tolerate LRD without s/s of aspiration.  -TM  Patient will tolerate LRD without s/s of aspiration.  -MM       Time Frame (Oral Nutrition/Hydration Goal 1, SLP)  by discharge  -TM  by discharge  -MM       Barriers (Oral Nutrition/Hydration Goal 1, SLP)  none  -TM  none  -MM       Progress/Outcomes (Oral Nutrition/Hydration Goal 1, SLP)  goal met  -TM  continuing progress toward goal  -MM         User Key  (r) = Recorded By, (t) = Taken By, (c) = Cosigned By    Initials Name Effective Dates    TM Colleen Jacinto, CCC-SLP 08/02/16 -     MM Maia Gonsalez, MS CCC-SLP 07/12/20 -           EDUCATION  The patient has been educated in the following areas:   Dysphagia (Swallowing Impairment).    SLP Recommendation and Plan                                                             Plan of Care Reviewed With: patient  Progress: improving  Outcome Summary: SLP dysphagia tx completed this AM. Upright in bedside chair at time of arrival, denied pain. Pt was alert, cooperative. She denied dysphagia with current diet of regular solid diet consistency with regular/thin liquid consistency with the exception of beef from beef roast. She denied coughing, throat clearing with PO. She was agreeable to PO trials of thin liquids via cup and regular solids. Pt self fed appropriately, presented with adequate mastication of solid without oral residue. She consumed multiple thin liquid trials without concerns with oral prep, transit, or laryngeal elevation. No overt s/s of aspiration. Cannot fully r/o aspiration at this time, yet feel oropharyngeal swallow fx is WNL at this time. For this reason, continued SLP services for dysphagia are not warranted at this time. However, pt reported recent difficulty (duration of  approximately one month) of memory difficulty, specifically numbers. She reported that, for this reason, she has turned over management of her finances to her significant other of 22 years. If pt is discharged to SNF this date, pt would benefit from SLP cognitive eval, otherwise, ST will plan to acutely eval cognitive fx on 06/08/2021 as able. Thanks!    SLP GOALS     Row Name 06/07/21 1000 06/04/21 1033          Oral Nutrition/Hydration Goal 1 (SLP)    Oral Nutrition/Hydration Goal 1, SLP  Patient will tolerate LRD without s/s of aspiration.  -TM  Patient will tolerate LRD without s/s of aspiration.  -MM     Time Frame (Oral Nutrition/Hydration Goal 1, SLP)  by discharge  -TM  by discharge  -MM     Barriers (Oral Nutrition/Hydration Goal 1, SLP)  none  -TM  none  -MM     Progress/Outcomes (Oral Nutrition/Hydration Goal 1, SLP)  goal met  -TM  continuing progress toward goal  -MM       User Key  (r) = Recorded By, (t) = Taken By, (c) = Cosigned By    Initials Name Provider Type    TM Colleen Jacinto CCC-SLP Speech and Language Pathologist    MM Maia Gonsalez, MS CCC-SLP Speech and Language Pathologist             Time Calculation:   Time Calculation- SLP     Row Name 06/07/21 1025             Time Calculation- SLP    SLP Start Time  1000  -TM      SLP Stop Time  1025  -TM      SLP Time Calculation (min)  25 min  -TM      SLP Received On  06/07/21  -TM         Untimed Charges    [id number] Treatment Swallow Minutes  25  -TM         Total Minutes    Untimed Charges Total Minutes  25  -TM       Total Minutes  25  -TM        User Key  (r) = Recorded By, (t) = Taken By, (c) = Cosigned By    Initials Name Provider Type     Colleen Jacinto CCC-SLP Speech and Language Pathologist          Therapy Charges for Today     Code Description Service Date Service Provider Modifiers Qty    [id number]  ST TREATMENT SWALLOW 2 6/7/2021 Colleen Jacinto CCC-SLP GN 1               BERNY Aparicio  6/7/2021

## 2021-06-08 PROBLEM — E87.1 HYPONATREMIA: Status: ACTIVE | Noted: 2021-01-01

## 2021-06-08 NOTE — DISCHARGE SUMMARY
[facility]  DISCHARGE SUMMARY       Date of Admission: 6/1/2021  Date of Discharge:  6/8/2021  Primary Care Physician: Bharati Dahl APRN    Discharge Diagnoses:  Active Hospital Problems    Diagnosis    • **Altered mental status    • Hyponatremia    • Hypoglycemia    • Aspiration into airway    • End stage renal failure on dialysis (CMS/[facility])    • Diabetes (CMS/[facility])    • HTN (hypertension)      Discharge diagnoses  Altered mental status/encephalopathy unclear etiology  Aspiration.  Recent history of aspiration pneumonia  End-stage renal disease on dialysis Tuesday Thursday Saturday  Diabetes mellitus  Hypertension  History of right humeral fracture on a sling in March  Diarrhea resolved  Hyponatremia      Presenting Problem/History of Present Illness:  Altered mental status, unspecified altered mental status type [R41.82]         Hospital Course  Patient is looking forward to be transferred to the skilled nursing facility today.  She has no new complaints.  She has not had any diarrhea.    She is a 61-year-old woman who I had seen for the first time on June 7.  She was admitted for altered mental status of unclear etiology.  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  She was also been found with hypoglycemia reportedly on arrival.  She carries history of diabetes mellitus.  Record indicates that she takes basal insulin 10 units prior to admission.  Her A1c is only 5.7.  She was only on sliding scale insulin throughout her hospitalization.  Her blood sugar were 128, 103 today.  She has not had any hypoglycemic readings.  From the record reviewed she received Zosyn to cover concern for aspiration.  She has had prior history of aspiration pneumonia after procedure.  CAT scan on arrival showed some debris in the bronchus.  She completed 6 days of antibiotic.    She has end-stage renal disease on dialysis.  She received maintenance  dialysis during her hospitalization.  Nephrology had followed her.  She has hyponatremia.  She is asymptomatic from it.  She has some fluid retention in her lower extremities.  She has pleural effusion although has not been requiring any oxygen.  She has had this pleural effusion since imaging study dating back in March 2021.  She also has a sling on right upper extremity.  She has known history of right humeral fracture dating back in March and was informed to follow-up with orthopedic surgery after discharge.  It is unclear whether she has seen them or not.  I recommend that she is referred back to Ortho.  Reviewing through the record dating back to March 26, it was not operative treatment.  The orthopedic surgeon who he supposed to see was Dr. Jeff Springer.    She had episodes of diarrhea however no abdominal pain associated or increase in white count or febrile illness.  She received Lomotil.  She has not had any diarrhea since then.  It was felt that it was antibiotic associated thus Zosyn was stopped sooner.    Overall she is doing a lot better and felt medically appropriate for discharge.      Procedures Performed: None    Consults:   Nephrology    Pertinent Test Results:   Lab Results (last 72 hours)     Procedure Component Value Units Date/Time    POC Glucose Once [[id number]]  (Normal) Collected: 06/08/21 0744    Specimen: Blood Updated: 06/08/21 0756     Glucose 103 mg/dL      Comment: : [id number] Irma LeoMeter ID: [id number]       Basic Metabolic Panel [[id number]]  (Abnormal) Collected: 06/08/21 0414    Specimen: Blood Updated: 06/08/21 0646     Glucose 108 mg/dL      BUN 30 mg/dL      Creatinine 4.66 mg/dL      Sodium 128 mmol/L      Potassium 3.9 mmol/L      Chloride 88 mmol/L      CO2 24.0 mmol/L      Calcium 9.7 mg/dL      eGFR   Amer --     Comment: <15 Indicative of kidney failure.        eGFR Non African Amer 9 mL/min/1.73      Comment: <15 Indicative of kidney failure.         BUN/Creatinine Ratio 6.4     Anion Gap 16.0 mmol/L     Narrative:      GFR Normal >60  Chronic Kidney Disease <60  Kidney Failure <15      POC Glucose Once [[id number]]  (Abnormal) Collected: 06/07/21 2106    Specimen: Blood Updated: 06/07/21 2117     Glucose 151 mg/dL      Comment: : [id number] Mayuri LoriMeter ID: [id number]       POC Glucose Once [[id number]]  (Abnormal) Collected: 06/07/21 1710    Specimen: Blood Updated: 06/07/21 1721     Glucose 158 mg/dL      Comment: : [id number] Anibal (Lv) Bethany CMeter ID: [id number]       POC Glucose Once [[id number]]  (Normal) Collected: 06/06/21 0753    Specimen: Blood Updated: 06/07/21 1551     Glucose 83 mg/dL      Comment: : oli Bonsd AllieMeter ID: [id number]       POC Glucose Once [[id number]]  (Abnormal) Collected: 06/07/21 1139    Specimen: Blood Updated: 06/07/21 1150     Glucose 217 mg/dL      Comment: : [id number] Anibal (Lv) Bethany CMeter ID: [id number]       POC Glucose Once [[id number]]  (Abnormal) Collected: 06/07/21 0759    Specimen: Blood Updated: 06/07/21 0811     Glucose 152 mg/dL      Comment: : [id number] Anibal (Lv) Bethany CMeter ID: [id number]       Basic Metabolic Panel [[id number]]  (Abnormal) Collected: 06/07/21 0449    Specimen: Blood Updated: 06/07/21 0644     Glucose 169 mg/dL      BUN 21 mg/dL      Creatinine 4.02 mg/dL      Sodium 130 mmol/L      Potassium 3.7 mmol/L      Chloride 90 mmol/L      CO2 24.0 mmol/L      Calcium 9.8 mg/dL      eGFR   Amer --     Comment: <15 Indicative of kidney failure.        eGFR Non African Amer 11 mL/min/1.73      Comment: <15 Indicative of kidney failure.        BUN/Creatinine Ratio 5.2     Anion Gap 16.0 mmol/L     Narrative:      GFR Normal >60  Chronic Kidney Disease <60  Kidney Failure <15      CBC & Differential [[id number]]  (Abnormal) Collected: 06/07/21 0449    Specimen: Blood Updated: 06/07/21 0604    Narrative:      The following orders were  created for panel order CBC & Differential.  Procedure                               Abnormality         Status                     ---------                               -----------         ------                     CBC Auto Differential[[id number]]        Abnormal            Final result                 Please view results for these tests on the individual orders.    CBC Auto Differential [[id number]]  (Abnormal) Collected: 06/07/21 0449    Specimen: Blood Updated: 06/07/21 0604     WBC 4.10 10*3/mm3      RBC 3.34 10*6/mm3      Hemoglobin 10.4 g/dL      Hematocrit 33.0 %      MCV 98.8 fL      MCH 31.1 pg      MCHC 31.5 g/dL      RDW 17.1 %      RDW-SD 61.8 fl      MPV 9.4 fL      Platelets 107 10*3/mm3      Neutrophil % 60.5 %      Lymphocyte % 17.8 %      Monocyte % 17.3 %      Eosinophil % 3.4 %      Basophil % 0.5 %      Immature Grans % 0.5 %      Neutrophils, Absolute 2.48 10*3/mm3      Lymphocytes, Absolute 0.73 10*3/mm3      Monocytes, Absolute 0.71 10*3/mm3      Eosinophils, Absolute 0.14 10*3/mm3      Basophils, Absolute 0.02 10*3/mm3      Immature Grans, Absolute 0.02 10*3/mm3      nRBC 1.2 /100 WBC     Blood Culture - Blood, Arm, Right [[id number]] Collected: 06/01/21 1711    Specimen: Blood from Arm, Right Updated: 06/06/21 1730     Blood Culture No growth at 5 days    POC Glucose Once [[id number]]  (Abnormal) Collected: 06/06/21 1640    Specimen: Blood Updated: 06/06/21 1652     Glucose 184 mg/dL      Comment: : JAKOB Bonds AllieMeter ID: [id number]       POC Glucose Once [[id number]]  (Abnormal) Collected: 06/06/21 1122    Specimen: Blood Updated: 06/06/21 1411     Glucose 138 mg/dL      Comment: : JAKOB Bonds AllieMeter ID: [id number]       POC Glucose Once [[id number]]  (Abnormal) Collected: 06/05/21 2123    Specimen: Blood Updated: 06/06/21 1410     Glucose 211 mg/dL      Comment: : [id number] Haja Duenas (Drury) AnnMeter ID: [id number]       POC Glucose Once [[id number]]   (Abnormal) Collected: 06/05/21 1725    Specimen: Blood Updated: 06/06/21 1410     Glucose 222 mg/dL      Comment: : [id number] Brittani ArndtMeter ID: [id number]       POC Glucose Once [[id number]]  (Abnormal) Collected: 06/04/21 2103    Specimen: Blood Updated: 06/06/21 1408     Glucose 202 mg/dL      Comment: : [id number] Maria Teresa MullenMeter ID: [id number]       Basic Metabolic Panel [[id number]]  (Abnormal) Collected: 06/06/21 0453    Specimen: Blood Updated: 06/06/21 0612     Glucose 80 mg/dL      BUN 14 mg/dL      Creatinine 2.93 mg/dL      Sodium 134 mmol/L      Potassium 3.1 mmol/L      Chloride 94 mmol/L      CO2 26.0 mmol/L      Calcium 9.6 mg/dL      eGFR Non African Amer 16 mL/min/1.73      BUN/Creatinine Ratio 4.8     Anion Gap 14.0 mmol/L     Narrative:      GFR Normal >60  Chronic Kidney Disease <60  Kidney Failure <15      CBC & Differential [[id number]]  (Abnormal) Collected: 06/06/21 0453    Specimen: Blood Updated: 06/06/21 0556    Narrative:      The following orders were created for panel order CBC & Differential.  Procedure                               Abnormality         Status                     ---------                               -----------         ------                     CBC Auto Differential[[id number]]        Abnormal            Final result                 Please view results for these tests on the individual orders.    CBC Auto Differential [[id number]]  (Abnormal) Collected: 06/06/21 0453    Specimen: Blood Updated: 06/06/21 0556     WBC 3.53 10*3/mm3      RBC 3.15 10*6/mm3      Hemoglobin 10.0 g/dL      Hematocrit 31.3 %      MCV 99.4 fL      MCH 31.7 pg      MCHC 31.9 g/dL      RDW 17.2 %      RDW-SD 63.0 fl      MPV 9.5 fL      Platelets 118 10*3/mm3      Neutrophil % 53.2 %      Lymphocyte % 22.1 %      Monocyte % 18.7 %      Eosinophil % 4.8 %      Basophil % 0.6 %      Immature Grans % 0.6 %      Neutrophils, Absolute 1.88 10*3/mm3      Lymphocytes, Absolute 0.78  10*3/mm3      Monocytes, Absolute 0.66 10*3/mm3      Eosinophils, Absolute 0.17 10*3/mm3      Basophils, Absolute 0.02 10*3/mm3      Immature Grans, Absolute 0.02 10*3/mm3      nRBC 1.4 /100 WBC     POC Glucose Once [[id number]]  (Normal) Collected: 06/05/21 1415    Specimen: Blood Updated: 06/05/21 1445     Glucose 130 mg/dL      Comment: : [id number] Brittani KrmichelleMeter ID: [id number]           Imaging Results (Last 7 Days)     Procedure Component Value Units Date/Time    XR Chest 1 View [[id number]] Collected: 06/07/21 0709     Updated: 06/07/21 0714    Narrative:      EXAMINATION: XR CHEST 1 VW- 6/7/2021 7:09 AM CDT     HISTORY: central line placement verification; R41.82-Altered mental  status, unspecified; E16.2-Hypoglycemia, unspecified; J69.0-Pneumonitis  due to inhalation of food and vomit; R13.10-Dysphagia, unspecified;  Z78.9-Other specified health status; Z74.09-Other reduced mobility.     REPORT: A frontal view the chest was obtained.     COMPARISON: Chest x-ray 6/1/2021.     The tip of the right internal jugular central line remains in  satisfactory position at the proximal SVC. There is continued stable  consolidation of the right base with obscuration of the diaphragm and  right heart margin, with a moderate size right pleural effusion.  Underlying pneumonia and/or atelectasis are possibilities. The left lung  shows no focal infiltrates, there is mild interstitial prominence. Mild  central vascular crowding is noted. Heart size is normal. There is a  stent in the left subclavian region as before. No pneumothorax is  identified.       Impression:      Stable chest x-ray, the right IJ central line tip remains in  satisfactory position over the proximal SVC. Continued consolidation of  the right lung base with effusion and atelectasis, underlying pneumonia  is not excluded.  This report was finalized on 06/07/2021 07:11 by Dr. Colton Hampton MD.    CT Abdomen Pelvis Without Contrast [[id number]]  Collected: 06/01/21 1952     Updated: 06/01/21 1957    Narrative:      CT ABDOMEN PELVIS WO CONTRAST- 6/1/2021 6:51 PM CDT     HISTORY: AMS; R41.82-Altered mental status, unspecified;  E16.2-Hypoglycemia, unspecified      COMPARISON: Chest CT dated 6/1/2021      DOSE LENGTH PRODUCT: 709 mGy cm. Automated exposure control was also  utilized to decrease patient radiation dose.     TECHNIQUE: Noncontrast enhanced images of the abdomen and pelvis  obtained without oral contrast. Multiplanar reformatted images were  provided for review.      FINDINGS:      LIVER: No focal liver lesion within limits of a noncontrast study.      BILIARY SYSTEM: The gallbladder is surgically absent. No intrahepatic or  extrahepatic ductal dilatation.      PANCREAS: No focal pancreatic lesion within limits of a noncontrast  study.      SPLEEN: Unremarkable.      KIDNEYS: Bilateral renal atrophy. No hydronephrosis. The ureters are  decompressed and normal in appearance.     ADRENALS: Unremarkable.     RETROPERITONEUM: No mass, lymphadenopathy or hemorrhage.      GI TRACT: The stomach and small bowel are unremarkable. Moderate colonic  stool. No inflammatory changes along the bowel.      OTHER: Bilateral upper thigh and diffuse body wall subcutaneous edema  reflecting systemic volume overload. Trace ascites which I suspect is  also related to the systemic volume overload. No acute bony abnormality.     PELVIS: No mass lesion, fluid collection or significant lymphadenopathy  is seen in the pelvis. The urinary bladder is normal in appearance.       Impression:      1. Anasarca. Otherwise, no acute process involving the abdomen or  pelvis.  This report was finalized on 06/01/2021 19:54 by Dr Jhonatan Berry, .    CT Chest Without Contrast Diagnostic [[id number]] Collected: 06/01/21 1948     Updated: 06/01/21 1955    Narrative:      CT CHEST WO CONTRAST DIAGNOSTIC- 6/1/2021 6:51 PM CDT     HISTORY: AMS; R41.82-Altered mental status,  unspecified;  E16.2-Hypoglycemia, unspecified     COMPARISON: CT scan dated 1/19/2020     DOSE LENGTH PRODUCT: 257 mGy cm. Automated exposure control was also  utilized to decrease patient radiation dose.     TECHNIQUE: Serial helical tomographic images of the chest were acquired  without contrast. Multiplanar reformatted images were provided for  review.     FINDINGS:     Visualized neck base: The imaged portion of the base of the neck appears  unremarkable.      Lungs: Right middle and right lower lobe airways are completely  opacified with debris and there is atelectasis of both of these lobes.  Interlobular septal thickening and peribronchial thickening with  bilateral groundglass opacities, reflecting pulmonary edema. There is a  moderate to large pleural effusion on the right. 7 mm left upper lobe  pulmonary nodule (series 3-image 47).     Heart: Prominent four-chamber cardiomegaly. There is no pericardial  effusion. Advanced coronary artery atheromatous calcification.     Vasculature: There is calcified atheromatous plaque in the aorta without  aneurysmal dilation. The pulmonary arteries are enlarged, suggestive of  pulmonary arterial hypertension.     Mediastinum and lymph nodes: No suspicious hilar or mediastinal  adenopathy..     Skeletal and soft tissues: Diffuse chest wall edema. Comminuted  fracturing of the right proximal humerus.       Impression:      1. Volume overload with interstitial and casey pulmonary edema. There is  also a moderate to large right pleural effusion.  2. There is debris diffusely opacifying the right middle and right lower  lobe airways with atelectasis of both of these lobes, suspected mucous  plugging or aspirated contents.  3. Comminuted fracturing of the right proximal humerus.  This report was finalized on 06/01/2021 19:52 by Dr Jhonatan Berry, .    CT Head Without Contrast [[id number]] Collected: 06/01/21 1930     Updated: 06/01/21 1934    Narrative:      CT HEAD WO  CONTRAST- 6/1/2021 6:51 PM CDT     HISTORY: Mental status change, unknown cause; R41.82-Altered mental  status, unspecified; E16.2-Hypoglycemia, unspecified       DOSE LENGTH PRODUCT: 668 mGy cm. Automated exposure control was also  utilized to decrease patient radiation dose.     Technique:   Axial CT of the brain without IV contrast. Sagittal and coronal  reformations are also provided for review. Soft tissue and bone kernels  are available for interpretation.     Comparison: CT scan dated 4/3/2021.     Findings:      There is no evidence of acute large vascular territory infarct. No  intra-axial or extra-axial hemorrhage. No visualized mass lesion or mass  effect. The ventricles, cortical sulci and basal cisterns are symmetric  and age appropriate.  Posterior fossa structures are unremarkable. The  scalp and calvarium are intact. Visualized paranasal sinuses and  mastoids are clear.        Impression:      Impression:    1. No acute intracranial process. Stable exam.  This report was finalized on 06/01/2021 19:31 by Dr Jhonatan Berry, .    XR Chest 1 View [[id number]] Collected: 06/01/21 1834     Updated: 06/01/21 1837    Narrative:      Frontal upright radiograph of the chest 6/1/2021 6:10 PM CDT     HISTORY: Central line     COMPARISON: 6/1/2021.       Impression:      FINDINGS/IMPRESSION:      Right IJ central line is in good position. Otherwise stable. No  pneumothorax.  This report was finalized on 06/01/2021 18:34 by Dr Jhonatan Berry, .    XR Chest 1 View [[id number]] Collected: 06/01/21 1715     Updated: 06/01/21 1720    Narrative:      Frontal upright radiograph of the chest 6/1/2021 5:12 PM CDT     HISTORY: Altered mental status     COMPARISON: Chest exam dated 5/15/2021.     FINDINGS:      Stable moderate to large layering right pleural effusion with continued  opacification of the right lung base. Left lung is clear. Heart size is  stable. The pulmonary vasculature are nondilated. No  "pneumothorax.  Partially imaged impacted fracture across the surgical neck of the right  proximal humerus.       Impression:      1. No significant interval change from the recent comparison exam. There  is a moderate to large layering right pleural effusion with  opacification of the right lung base.        This report was finalized on 06/01/2021 17:17 by Dr Jhonatan Berry, .            Condition on Discharge:   Stable  Physical Exam on Discharge:  /80 (BP Location: Right arm, Patient Position: Lying)   Pulse 82   Temp 97.6 °F (36.4 °C) (Oral)   Resp 16   Ht 139.7 cm (55\")   Wt 65.1 kg (143 lb 8 oz)   SpO2 99%   BMI 33.35 kg/m²   Physical Exam  Vitals reviewed.   Constitutional:       General: She is not in acute distress.     Appearance: She is not toxic-appearing.   HENT:      Head: Normocephalic and atraumatic.      Nose: Nose normal.      Mouth/Throat:      Mouth: Mucous membranes are moist.      Pharynx: Oropharynx is clear.   Eyes:      General: No scleral icterus.     Extraocular Movements: Extraocular movements intact.      Conjunctiva/sclera: Conjunctivae normal.      Pupils: Pupils are equal, round, and reactive to light.   Neck:      Comments: Right IJ central line will be removed prior to d/c  Cardiovascular:      Rate and Rhythm: Normal rate and regular rhythm.   Pulmonary:      Effort: Pulmonary effort is normal. No respiratory distress.      Breath sounds: No wheezing.      Comments: Diminished right lung base  Abdominal:      General: There is no distension.      Tenderness: There is no abdominal tenderness. There is no guarding.      Comments: Obese abdomen     Musculoskeletal:      Cervical back: Normal range of motion and neck supple. No rigidity.      Comments: Sling on right arm   Skin:     Capillary Refill: Capillary refill takes less than 2 seconds.   Neurological:      General: No focal deficit present.      Mental Status: She is alert and oriented to person, place, and time. " Mental status is at baseline.   Psychiatric:         Mood and Affect: Mood normal.         Behavior: Behavior normal.         Thought Content: Thought content normal.         Judgment: Judgment normal.           Discharge Disposition:  Skilled Nursing Facility (DC - External)    Discharge Medications:     Discharge Medications      New Medications      Instructions Start Date   epoetin vika-epbx 53795 UNIT/ML injection  Commonly known as: RETACRIT   10,000 Units, Subcutaneous, 3 Times Weekly   Start Date: June 9, 2021     insulin lispro 100 UNIT/ML injection  Commonly known as: humaLOG   2-7 Units, Subcutaneous, 3 Times Daily Before Meals         Changes to Medications      Instructions Start Date   famotidine 20 MG tablet  Commonly known as: PEPCID  What changed: when to take this   20 mg, Oral, Daily      Ferric Citrate 1  MG(Fe) tablet  What changed:   · how much to take  · when to take this   840 mg, Oral, 3 Times Daily With Meals      Ventolin  (90 Base) MCG/ACT inhaler  Generic drug: albuterol sulfate HFA  What changed: when to take this   2 puffs, Inhalation, Every 4 Hours PRN         Continue These Medications      Instructions Start Date   aspirin 81 MG EC tablet   81 mg, Oral, Daily      atropine 1 % ophthalmic solution   1 drop, Left Eye, Daily      brimonidine 0.2 % ophthalmic solution  Commonly known as: ALPHAGAN   1 drop, Left Eye, 2 Times Daily PRN      carvedilol 6.25 MG tablet  Commonly known as: COREG   6.25 mg, Oral, 2 Times Daily With Meals      cholecalciferol 25 MCG (1000 UT) tablet  Commonly known as: VITAMIN D3   2,000 Units, Oral, Daily      dorzolamide 2 % ophthalmic solution  Commonly known as: TRUSOPT   1 drop, Left Eye, 2 Times Daily      fluticasone 50 MCG/ACT nasal spray  Commonly known as: FLONASE   2 sprays, Nasal, 2 Times Daily      ipratropium-albuterol 0.5-2.5 mg/3 ml nebulizer  Commonly known as: DUO-NEB   3 mL, Nebulization, Every 4 Hours PRN      lactobacillus  acidophilus capsule capsule   1 capsule, Oral, Daily      metoclopramide 5 MG tablet  Commonly known as: REGLAN   5 mg, Oral, 4 Times Daily Before Meals & Nightly      ondansetron 4 MG tablet  Commonly known as: ZOFRAN   4 mg, Oral, Every 6 Hours PRN      pravastatin 10 MG tablet  Commonly known as: PRAVACHOL   10 mg, Oral, Nightly      sertraline 25 MG tablet  Commonly known as: ZOLOFT   25 mg, Oral, Daily      timolol 0.5 % ophthalmic solution  Commonly known as: TIMOPTIC   1 drop, Left Eye, Every Morning         Stop These Medications    insulin glargine 100 UNIT/ML injection  Commonly known as: LANTUS, SEMGLEE     losartan 50 MG tablet  Commonly known as: COZAAR     oxyCODONE-acetaminophen 7.5-325 MG per tablet  Commonly known as: PERCOCET     pantoprazole 40 MG EC tablet  Commonly known as: PROTONIX     pregabalin 50 MG capsule  Commonly known as: LYRICA     traMADol 50 MG tablet  Commonly known as: ULTRAM     TRAVATAN OP     Velphoro 500 MG chewable tablet  Generic drug: Sucroferric Oxyhydroxide            Discharge Diet:   Diet Instructions     Diet: Consistent Carbohydrate, Renal      Discharge Diet:  Consistent Carbohydrate  Renal       Fluid Restriction per day: 1500 mL Fluid              Activity at Discharge:   Activity Instructions     Gradually Increase Activity Until at Pre-Hospitalization Level            Follow-up Appointments:  Skilled nursing facility PCP within 24 to 48 hours; recommend follow-up BMP for hyponatremia/hypervolemia likely from end-stage renal disease  Keep appointment for maintenance dialysis every Tuesday, Thursday, Saturday  Nephrology per their recommendation    Test Results Pending at Discharge:      Electronically signed by Sundeep Aldridge MD, 6/8/2021, 09:22 CDT.    Time: > 30 mins        Part of this note may be an electronic transcription/translation of spoken language to printed text using the Dragon Dictation System.    Patient had dialysis today.  She had  bleeding through her AV fistula.  The nurse had told me she applied pressure to it over an hour.  Dr. Boyd subsequently saw the patient and called me overhead.  He had asked Dr. Becerra to see the patient.  He went to the bedside and stitch the area.  The bleeding subsequently stopped after his about 5 minutes of applied pressure.  I rechecked the hemoglobin which is stable at 10.4.  The nurse told me she may have lost about 150 cc of blood.  With this her level currently and bleeding had stopped, no indication for blood transfusion.  She was cleared by Dr. Becerra and Dr. Boyd for discharge.  Patient may be discharged from my standpoint as well.  Patient is very adamant of going home too.    Patient seen and examined in the dialysis center.  She is hemodynamically stable  Not in any distress  Alert and oriented x3  Lungs are clear to auscultation      Electronically signed by Sundeep Aldridge MD, [unfilled], 15:33 CDT.    Additional time spent at least 15 minutes in seeing the patient in documentation.

## 2021-06-08 NOTE — CONSULTS
Mini Gentile  [id number]  [id number]  357/2  Sundeep Aldridge*  6/1/2021    Chief Complaint   Patient presents with   • Hypoglycemia       HPI: Mini Gentile is a 61 y.o. female who presented with altered mental status and hypoglycemia.  She was admitted to the medical service on 6/1.  She is known to me given her history of a left upper extremity AV fistula that was created outside and recently had a fistulogram about a month ago due to pulsatility of the fistula.  At that time there was some stenosis of the cephalic arch and venoplasty was performed with good result.  She has been using the fistula since with no significant issues.  They were planning for discharge today however on dialysis today went through her normal treatment but at the end on removing the needle in the proximal upper arm had some bleeding from the fistula.  Apparently despite manual pressure there was continued oozing from that site.  Ultimately I was called.  When seen in the dialysis unit here it was noted that the fistula had been accessed very laterally on an area of ectasia almost on the underside of the fistula that was difficult to hold pressure and so and manual pressure was being held it was not being applied uniformly.  The patient denied any chest pain or shortness of breath.  I held manual pressure myself at that site for 5 minutes ensuring coverage of the access site and with that there was no further bleeding.  Hemoglobin had been rechecked by the primary team and noted to be stable.  The patient otherwise was awake and alert and did not complain of any pain and denied any other issues with the fistula.  She had no other acute complaints.    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)    • End stage renal disease on dialysis (CMS/[facility] located within [facility])    • Eustachian tube dysfunction    • GERD (gastroesophageal reflux disease)   "  • Glaucoma    • HTN (hypertension)    • Hyperlipidemia    • Mucoid otitis media    • Noncompliance of patient with renal dialysis (CMS/HCC)    • Tobacco abuse        Past Surgical History:   Procedure Laterality Date   • ARTERIOVENOUS FISTULA     • CATARACT EXTRACTION WITH INTRAOCULAR LENS IMPLANT     •  SECTION     • CHOLECYSTECTOMY     • MYRINGOTOMY W/ TUBES Bilateral    • MYRINGOTOMY W/ TUBES Right    • TUBAL ABDOMINAL LIGATION         Family History   Problem Relation Age of Onset   • Heart disease Mother    • Diabetes Father    • Colon cancer Neg Hx    • Colon polyps Neg Hx        Social History     Socioeconomic History   • Marital status: Single     Spouse name: Not on file   • Number of children: Not on file   • Years of education: Not on file   • Highest education level: Not on file   Tobacco Use   • Smoking status: Current Every Day Smoker     Packs/day: 2.00     Years: 6.00     Pack years: 12.00     Types: Cigarettes   • Smokeless tobacco: Never Used   Vaping Use   • Vaping Use: Never used   Substance and Sexual Activity   • Alcohol use: No   • Drug use: No   • Sexual activity: Defer       Allergies   Allergen Reactions   • Bupropion Nausea Only   • Chantix [Varenicline] Other (See Comments)     Does not remember   • Gabapentin Hallucinations     hallucinations   • Azithromycin Rash   • Levofloxacin Rash   • Penicillins Rash   • Sulfa Antibiotics Rash       Hospital Medications (active)       Dose Frequency Start End    acetaminophen (TYLENOL) suppository 650 mg 650 mg Every 4 Hours PRN 2021     Admin Instructions: Do not exceed 4 grams of acetaminophen in a 24 hr period. Max dose of 2gm for AST/ALT greater than 120 units/L<BR><BR><BR>If given for pain, use the following pain scale: <BR>Mild Pain = Pain Score of 1-3, CPOT 1-2<BR>Moderate Pain = Pain Score of 4-6, CPOT 3-4<BR>Severe Pain = Pain Score of 7-10, CPOT 5-8    Route: Rectal    Linked Group 1: \"Or\" Linked Group Details        " "acetaminophen (TYLENOL) tablet 650 mg 650 mg Every 4 Hours PRN 6/2/2021     Admin Instructions: Do not exceed 4 grams of acetaminophen in a 24 hr period. Max dose of 2gm for AST/ALT greater than 120 units/L<BR><BR><BR>If given for pain, use the following pain scale: <BR>Mild Pain = Pain Score of 1-3, CPOT 1-2<BR>Moderate Pain = Pain Score of 4-6, CPOT 3-4<BR>Severe Pain = Pain Score of 7-10, CPOT 5-8    Route: Oral    Linked Group 1: \"Or\" Linked Group Details        aspirin EC tablet 81 mg 81 mg Daily 6/2/2021     Admin Instructions: Herbal/drug interaction: Avoid use with ginkgo biloba. Do not crush or chew.<BR>Do not exceed 4 grams of aspirin in a 24 hr period.<BR><BR>If given for pain, use the following pain scale: <BR>Mild Pain = Pain Score of 1-3, CPOT 1-2<BR>Moderate Pain = Pain Score of 4-6, CPOT 3-4<BR>Severe Pain = Pain Score of 7-10, CPOT 5-8    Route: Oral    atropine 1 % ophthalmic solution 1 drop 1 drop Daily 6/2/2021     Admin Instructions: Compress lacrimal sac for 2-3 minutes after administration    Route: Left Eye    brimonidine (ALPHAGAN) 0.2 % ophthalmic solution 1 drop 1 drop 3 Times Daily 6/2/2021     Route: Left Eye    cholecalciferol (VITAMIN D3) tablet 2,000 Units 2,000 Units Daily 6/2/2021     Route: Oral    dextrose (D50W) 25 g/ 50mL Intravenous Solution 25 g 25 g Every 15 Minutes PRN 6/2/2021     Admin Instructions: Blood sugar less than 70; patient has IV access - Unresponsive, NPO or Unable To Safely Swallow    Route: Intravenous    dextrose (GLUTOSE) oral gel 15 g 15 g Every 15 Minutes PRN 6/2/2021     Admin Instructions: BS<70, Patient Alert, Is not NPO, Can safely swallow.    Route: Oral    dorzolamide (TRUSOPT) 2 % ophthalmic solution 1 drop 1 drop 2 Times Daily 6/2/2021     Route: Left Eye    epoetin vika-epbx (RETACRIT) injection 10,000 Units 10,000 Units 3 Times Weekly 6/4/2021     Route: Subcutaneous    famotidine (PEPCID) tablet 20 mg 20 mg Daily 6/3/2021     Route: Oral    " Ferric Citrate tablet 840 mg 4 tablet 3 Times Daily With Meals 6/2/2021     Route: Oral    fluticasone (FLONASE) 50 MCG/ACT nasal spray 2 spray 2 spray 2 Times Daily 6/2/2021     Route: Nasal    glucagon (human recombinant) (GLUCAGEN DIAGNOSTIC) injection 1 mg 1 mg Every 15 Minutes PRN 6/2/2021     Admin Instructions: Blood Glucose Less Than 70 - Patient Without IV Access - Unresponsive, NPO or Unable To Safely Swallow    Route: Subcutaneous    insulin lispro (humaLOG) injection 2-7 Units 2-7 Units 3 Times Daily Before Meals 6/2/2021     Admin Instructions: Correction - Low Dose.  Less than 40 units/day total insulin dose or lean, elderly, renal patients<BR><BR>Blood glucose 150-199 mg/dL - 2 units<BR>Blood glucose 200-249 mg/dL - 3 units<BR>Blood glucose 250-299 mg/dL - 4 units<BR>Blood glucose 300-349 mg/dL - 5 units<BR>Blood glucose 350-400 mg/dL - 6 units<BR>Blood glucose greater than 400 mg/dL - 7 units and call provider<BR> Caution: Look alike/sound alike drug alert    Route: Subcutaneous    ipratropium-albuterol (DUO-NEB) nebulizer solution 3 mL 3 mL Every 4 Hours PRN 6/2/2021     Route: Nebulization    lactobacillus acidophilus (RISAQUAD) capsule 1 capsule 1 capsule Daily 6/2/2021     Route: Oral    metoclopramide (REGLAN) tablet 5 mg 5 mg 4 Times Daily Before Meals & Nightly 6/2/2021     Admin Instructions:     Route: Oral    ondansetron (ZOFRAN) tablet 4 mg 4 mg Every 6 Hours PRN 6/2/2021     Route: Oral    pravastatin (PRAVACHOL) tablet 10 mg 10 mg Nightly 6/2/2021     Admin Instructions: Avoid grapefruit juice.    Route: Oral    sertraline (ZOLOFT) tablet 25 mg 25 mg Daily 6/2/2021     Route: Oral    sodium chloride 0.9 % bolus 100 mL 100 mL As Needed 6/5/2021     Admin Instructions: Give 100 to 200 mL for hypotension up to 1000 mL during treatment    Notes to Pharmacy: Use if b/p systolic less than 90    Route: Intravenous    sodium chloride 0.9 % flush 10 mL 10 mL Every 12 Hours Scheduled 6/2/2021      Route: Intravenous    sodium chloride 0.9 % flush 10 mL 10 mL As Needed 6/2/2021     Route: Intravenous    timolol (TIMOPTIC) 0.5 % ophthalmic solution 1 drop 1 drop Daily 6/2/2021     Route: Left Eye          Review of Systems   Constitutional: Negative.  Negative for activity change, appetite change, chills, diaphoresis, fatigue and fever.   HENT: Negative.  Negative for congestion, sneezing, sore throat and trouble swallowing.    Eyes: Negative.  Negative for visual disturbance.   Respiratory: Negative.  Negative for chest tightness and shortness of breath.    Cardiovascular: Negative for chest pain, palpitations and leg swelling.   Gastrointestinal: Negative.  Negative for abdominal distention, abdominal pain, nausea and vomiting.   Endocrine: Negative.    Genitourinary: Negative.    Musculoskeletal: Negative.    Skin: Negative.    Allergic/Immunologic: Negative.    Neurological: Negative.    Hematological: Negative.    Psychiatric/Behavioral: Negative.        Physical Exam  Vitals reviewed.   Constitutional:       Appearance: Normal appearance.   HENT:      Head: Normocephalic and atraumatic.      Nose: Nose normal.      Mouth/Throat:      Mouth: Mucous membranes are moist.   Eyes:      Extraocular Movements: Extraocular movements intact.      Pupils: Pupils are equal, round, and reactive to light.   Cardiovascular:      Rate and Rhythm: Normal rate and regular rhythm.      Pulses: Normal pulses.           Carotid pulses are 2+ on the right side and 2+ on the left side.       Radial pulses are 2+ on the right side and 2+ on the left side.        Femoral pulses are 2+ on the right side and 2+ on the left side.     Comments: Her left upper extremity AV fistula has a good thrill along its course.  When seen in dialysis the access it on the upper arm had some oozing from the site.  It appears that the fistula had been accessed on the very lateral aspect of a small area of ectasia and manual pressure was difficult  to hold at that site and that is where the bleeding was coming from.  Pulmonary:      Effort: Pulmonary effort is normal. No respiratory distress.   Abdominal:      General: Abdomen is flat. There is no distension.      Palpations: Abdomen is soft. There is no mass.      Tenderness: There is no abdominal tenderness.   Musculoskeletal:         General: Normal range of motion.      Cervical back: Normal range of motion and neck supple.      Right lower leg: No edema.      Left lower leg: No edema.   Skin:     General: Skin is warm and dry.      Capillary Refill: Capillary refill takes 2 to 3 seconds.   Neurological:      General: No focal deficit present.      Mental Status: She is alert and oriented to person, place, and time.   Psychiatric:         Mood and Affect: Mood normal.         Behavior: Behavior normal.         Thought Content: Thought content normal.         Judgment: Judgment normal.         Laboratory Data:  Results from last 7 days   Lab Units 06/08/21  1457 06/07/21  0449 06/06/21  0453 06/04/21  0449   WBC 10*3/mm3  --  4.10 3.53 2.79*   HEMOGLOBIN g/dL 10.4* 10.4* 10.0* 10.2*   HEMATOCRIT % 32.5* 33.0* 31.3* 31.2*   PLATELETS 10*3/mm3  --  107* 118* 146       Results from last 7 days   Lab Units 06/08/21  0414 06/07/21  0449 06/06/21  0453 06/02/21  0318 06/01/21  1910 06/01/21  1759   SODIUM mmol/L 128* 130* 134* 131* 131* 133*   POTASSIUM mmol/L 3.9 3.7 3.1* 3.6 3.6 3.7   CHLORIDE mmol/L 88* 90* 94* 92* 89* 91*   CO2 mmol/L 24.0 24.0 26.0 29.0 26.0 28.0   BUN mg/dL 30* 21 14 25* 24* 23   CREATININE mg/dL 4.66* 4.02* 2.93* 3.90* 3.42* 3.30*   CALCIUM mg/dL 9.7 9.8 9.6 8.3* 9.1 9.1   BILIRUBIN mg/dL  --   --   --  0.3 0.4 0.4   ALK PHOS U/L  --   --   --  206* 260* 263*   ALT (SGPT) U/L  --   --   --  14 17 17   AST (SGOT) U/L  --   --   --  17 21 20   GLUCOSE mg/dL 108* 169* 80 200* 144* 126*     Results from last 7 days   Lab Units 06/01/21  1910   INR  1.19*   APTT seconds 49.2*         Diagnostic  Data:  Imaging Results (Last 24 Hours)     ** No results found for the last 24 hours. **          Impression:    Altered mental status    End stage renal failure on dialysis (CMS/[facility])    Diabetes (CMS/[facility])    HTN (hypertension)    Aspiration into airway    Hypoglycemia    Hyponatremia      Plan: After thoroughly evaluating Mini Gentile, I believe the best course of action is to remain conservative from a vascular standpoint.  She had some bleeding after dialysis today but I think this was an issue with where the fistula was accessed and the inability to hold adequate pressure.  With about 5 minutes of manual pressure held to the cannulation site there was no further bleeding and no other issues.  This was around 2 PM.  I did recheck the patient recently and again her dressings are intact with no signs of bleeding and a good thrill in the fistula.  As such at this point from a vascular standpoint she is stable for discharge.  I did discuss this with Dr. Aldridge of the hospitalist team.  She does have upcoming follow-up with me in about a week and I will see her in the office at that time.  Otherwise she can continue with dialysis through the left arm fistula as previous.  Thank you for allowing me to see Mini Gentile in consult. Please feel free to reach out with any questions or concerns.    Electronically signed by Vic Becerra MD, 06/08/21, 5:13 PM CDT.

## 2021-06-08 NOTE — PLAN OF CARE
Goal Outcome Evaluation:               Patient to be discharged to [facility] after dialysis today.

## 2021-06-08 NOTE — CASE MANAGEMENT/SOCIAL WORK
Continued Stay Note  [facility]     Patient Name: Mini Gentile  MRN: [id number]  Today's Date: 6/8/2021    Admit Date: 6/1/2021    Discharge Plan     Row Name 06/08/21 0954       Plan    Plan  [facility]    Patient/Family in Agreement with Plan  yes    Final Discharge Disposition Code  03 - skilled nursing facility (SNF)    Final Note  Pt will be admitted to [facility] today, Elsy from facility aware. Pt will be admitted at SNF level care. Informed S/O- Jose and he will have someone transport her there this afternoon.    [facility] [phone]  Fax [phone]    Row Name 06/08/21 0834       Plan    Plan  [facility]- upon ins. precert approval    Patient/Family in Agreement with Plan  yes    Plan Comments  Spoke with Elsy from [facility], no precert yet but ins. is reviewing. Will follow for decision.        Discharge Codes    No documentation.       Expected Discharge Date and Time     Expected Discharge Date Expected Discharge Time    Jun 8, 2021             SACHI Douglas

## 2021-06-08 NOTE — CASE MANAGEMENT/SOCIAL WORK
Continued Stay Note   [facility]     Patient Name: Mini Gentile  MRN: [id number]  Today's Date: 6/8/2021    Admit Date: 6/1/2021    Discharge Plan     Row Name 06/08/21 0834       Plan    Plan  [facility]    Patient/Family in Agreement with Plan  yes    Plan Comments  Spoke with Elsy from [facility], no precert yet but ins. is reviewing. Will follow for decision.    Elsy called right back saying precert for insurance available. Informed Dr. Aldridge.         Discharge Codes    No documentation.             SACHI Douglas

## 2021-06-08 NOTE — PROGRESS NOTES
Nephrology ([facility]) Progress Note      Patient:  Mini Gentile  YOB: 1960  Date of Service: 6/8/2021  MRN: [id number]   Acct: [id number]   Primary Care Physician: Bharati Dahl APRN  Advance Directive:   Code Status and Medical Interventions:   Ordered at: 06/02/21 0310     Code Status:    CPR     Medical Interventions (Level of Support Prior to Arrest):    Full     Admit Date: 6/1/2021       Hospital Day: 7  Referring Provider: Tejinder Kent MD      Patient personally seen and examined.  Complete chart including Consults, Notes, Operative Reports, Labs, Cardiology, and Radiology studies reviewed as able.        Subjective:  Mini Gentile is a 61 y.o. female  whom we were consulted for end stage renal disease. Maintenance hemodialysis on Tuesday Thursday Saturday at [facility]. Fairly noncompliant with dialysis treatments but did attend on 6/01.  Discharged two weeks ago from [facility] following fistulogram/venoplasty of left extremity on 5/13.  Presented this time with altered mental status. She was hypoglycemic, treated with IV dextrose, and was also given Narcan.  Has been tolerating HD treatments well.    Today is awake, oriented.  No new overnight issues. Seen during dialysis  Dialysis   Pt was seen on RRT. Tolerating well  Modality: Hemodialysis  Access: Arterial Venous Fistula  Location: left upper  QB: 400  QD: 600  UF: 3000    Allergies:  Bupropion, Chantix [varenicline], Gabapentin, Azithromycin, Levofloxacin, Penicillins, and Sulfa antibiotics    Home Meds:  Medications Prior to Admission   Medication Sig Dispense Refill Last Dose   • albuterol sulfate  (90 Base) MCG/ACT inhaler Inhale 2 puffs Every 4 (Four) Hours As Needed for Wheezing. (Patient taking differently: Inhale 2 puffs Every 6 (Six) Hours As Needed for Wheezing.) 18 g 0 Past Month at Unknown time   • aspirin 81 MG EC tablet Take 81 mg by mouth Daily.   6/1/2021 at  Unknown time   • atropine 1 % ophthalmic solution Administer 1 drop into the left eye Daily.   6/1/2021 at Unknown time   • brimonidine (ALPHAGAN) 0.2 % ophthalmic solution Administer 1 drop into the left eye 2 (Two) Times a Day As Needed.   6/1/2021 at Unknown time   • carvedilol (COREG) 6.25 MG tablet Take 6.25 mg by mouth 2 (Two) Times a Day With Meals.   6/1/2021 at Unknown time   • cholecalciferol (VITAMIN D3) 1000 units tablet Take 2,000 Units by mouth Daily.   6/1/2021 at Unknown time   • dorzolamide (TRUSOPT) 2 % ophthalmic solution Administer 1 drop into the left eye 2 (Two) Times a Day.   6/1/2021 at Unknown time   • fluticasone (FLONASE) 50 MCG/ACT nasal spray 2 sprays into the nostril(s) as directed by provider 2 (Two) Times a Day.   Past Week at Unknown time   • insulin glargine (LANTUS) 100 UNIT/ML injection Inject 10 Units under the skin Every Night.   6/1/2021 at Unknown time   • ipratropium-albuterol (DUO-NEB) 0.5-2.5 mg/3 ml nebulizer Take 3 mL by nebulization Every 4 (Four) Hours As Needed for Wheezing or Shortness of Air.   Past Month at Unknown time   • losartan (COZAAR) 50 MG tablet Take 50 mg by mouth Daily. Pt was decreased to one daily.   6/1/2021 at Unknown time   • oxyCODONE-acetaminophen (PERCOCET) 7.5-325 MG per tablet Take 1 tablet by mouth Every 8 (Eight) Hours As Needed. Pt has one pill left. Last filled 5/21/21 6/1/2021 at Unknown time   • pantoprazole (PROTONIX) 40 MG EC tablet Take 1 tablet by mouth Daily. 30 tablet 1 6/1/2021 at Unknown time   • pregabalin (LYRICA) 50 MG capsule Take 50 mg by mouth 2 (Two) Times a Day.   6/1/2021 at Unknown time   • traMADol (ULTRAM) 50 MG tablet Take 50 mg by mouth Every 6 (Six) Hours As Needed for Moderate Pain . Pt takes one daily.   6/1/2021 at Unknown time   • Travoprost (TRAVATAN OP) Apply  to eye(s) as directed by provider.   5/31/2021   • [DISCONTINUED] metoclopramide (REGLAN) 5 MG tablet Take 5 mg by mouth 4 (Four) Times a Day Before  Meals & at Bedtime.      • [DISCONTINUED] ondansetron (ZOFRAN) 4 MG tablet Take 1 tablet by mouth Every 6 (Six) Hours As Needed for Nausea or Vomiting. 24 tablet 2    • pravastatin (PRAVACHOL) 10 MG tablet Take 10 mg by mouth Every Night.   5/31/2021   • Sucroferric Oxyhydroxide (Velphoro) 500 MG chewable tablet Chew 1 tablet. Takes with Dialysis   Unknown at Unknown time   • [DISCONTINUED] lactobacillus acidophilus (RISAQUAD) capsule capsule Take 1 capsule by mouth Daily. 30 capsule 2    • [DISCONTINUED] sertraline (ZOLOFT) 25 MG tablet Take 25 mg by mouth Daily.      • [DISCONTINUED] timolol (TIMOPTIC) 0.5 % ophthalmic solution Administer 1 drop into the left eye Every Morning.          Medicines:  Current Facility-Administered Medications   Medication Dose Route Frequency Provider Last Rate Last Admin   • acetaminophen (TYLENOL) tablet 650 mg  650 mg Oral Q4H PRN Bin Guy DO   650 mg at 06/08/21 0743    Or   • acetaminophen (TYLENOL) suppository 650 mg  650 mg Rectal Q4H PRN Bin Guy, DO       • aspirin EC tablet 81 mg  81 mg Oral Daily Bin Guy DO   81 mg at 06/07/21 0925   • atropine 1 % ophthalmic solution 1 drop  1 drop Left Eye Daily Bin Guy DO   1 drop at 06/07/21 0924   • brimonidine (ALPHAGAN) 0.2 % ophthalmic solution 1 drop  1 drop Left Eye TID Bin Guy DO   1 drop at 06/07/21 2119   • cholecalciferol (VITAMIN D3) tablet 2,000 Units  2,000 Units Oral Daily Bin Guy DO   2,000 Units at 06/07/21 0925   • dextrose (D50W) 25 g/ 50mL Intravenous Solution 25 g  25 g Intravenous Q15 Min PRN Bin Guy, DO       • dextrose (GLUTOSE) oral gel 15 g  15 g Oral Q15 Min PRN Bin Guy, DO       • dorzolamide (TRUSOPT) 2 % ophthalmic solution 1 drop  1 drop Left Eye BID Bin Guy DO   1 drop at 06/07/21 2110   • epoetin vika-epbx (RETACRIT) injection 10,000 Units  10,000 Units Subcutaneous Once per day on Mon Wed Fri Jose Caballero MD   10,000  Units at 06/07/21 2216   • famotidine (PEPCID) tablet 20 mg  20 mg Oral Daily Bin Guy DO   20 mg at 06/07/21 0925   • Ferric Citrate tablet 840 mg  4 tablet Oral TID With Meals Bin Guy DO   840 mg at 06/07/21 1829   • fluticasone (FLONASE) 50 MCG/ACT nasal spray 2 spray  2 spray Nasal BID Bin Guy DO   2 spray at 06/07/21 2110   • glucagon (human recombinant) (GLUCAGEN DIAGNOSTIC) injection 1 mg  1 mg Subcutaneous Q15 Min PRN Bin Guy DO       • insulin lispro (humaLOG) injection 2-7 Units  2-7 Units Subcutaneous TID AC Bin Guy DO   2 Units at 06/07/21 1829   • ipratropium-albuterol (DUO-NEB) nebulizer solution 3 mL  3 mL Nebulization Q4H PRN Bin Guy DO       • lactobacillus acidophilus (RISAQUAD) capsule 1 capsule  1 capsule Oral Daily Bin Guy DO   1 capsule at 06/07/21 0924   • metoclopramide (REGLAN) tablet 5 mg  5 mg Oral 4x Daily AC & at Bedtime Bin Guy DO   5 mg at 06/08/21 0542   • ondansetron (ZOFRAN) tablet 4 mg  4 mg Oral Q6H PRN Bin Guy DO       • pravastatin (PRAVACHOL) tablet 10 mg  10 mg Oral Nightly Bin Guy DO   10 mg at 06/07/21 2110   • sertraline (ZOLOFT) tablet 25 mg  25 mg Oral Daily Bin Guy DO   25 mg at 06/07/21 0924   • sodium chloride 0.9 % bolus 100 mL  100 mL Intravenous PRN Jose Caballero MD       • sodium chloride 0.9 % flush 10 mL  10 mL Intravenous Q12H Bin Guy DO   10 mL at 06/07/21 2111   • sodium chloride 0.9 % flush 10 mL  10 mL Intravenous PRN Bin Guy DO       • timolol (TIMOPTIC) 0.5 % ophthalmic solution 1 drop  1 drop Left Eye Daily Bin Guy DO   1 drop at 06/07/21 0924       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    • Chronic otitis media    • Diabetes (CMS/HCC)    • End stage renal disease on dialysis (CMS/[facility])    • Eustachian tube dysfunction    • GERD  (gastroesophageal reflux disease)    • Glaucoma    • HTN (hypertension)    • Hyperlipidemia    • Mucoid otitis media    • Noncompliance of patient with renal dialysis (CMS/HCC)    • Tobacco abuse        Past Surgical History:  Past Surgical History:   Procedure Laterality Date   • ARTERIOVENOUS FISTULA     • CATARACT EXTRACTION WITH INTRAOCULAR LENS IMPLANT     •  SECTION     • CHOLECYSTECTOMY     • MYRINGOTOMY W/ TUBES Bilateral    • MYRINGOTOMY W/ TUBES Right    • TUBAL ABDOMINAL LIGATION         Family History  Family History   Problem Relation Age of Onset   • Heart disease Mother    • Diabetes Father    • Colon cancer Neg Hx    • Colon polyps Neg Hx        Social History  Social History     Socioeconomic History   • Marital status: Single     Spouse name: Not on file   • Number of children: Not on file   • Years of education: Not on file   • Highest education level: Not on file   Tobacco Use   • Smoking status: Current Every Day Smoker     Packs/day: 2.00     Years: 6.00     Pack years: 12.00     Types: Cigarettes   • Smokeless tobacco: Never Used   Vaping Use   • Vaping Use: Never used   Substance and Sexual Activity   • Alcohol use: No   • Drug use: No   • Sexual activity: Defer         Review of Systems:  History obtained from chart review and the patient  General ROS: positive for  - fatigue and malaise  Respiratory ROS: no cough, shortness of breath, or wheezing  Cardiovascular ROS: no chest pain or dyspnea on exertion  Gastrointestinal ROS: no abdominal pain, change in bowel habits, or black or bloody stools  Genito-Urinary ROS: no dysuria, trouble voiding, or hematuria  Musculoskeletal ROS: negative  Neurological ROS: no TIA or stroke symptoms    Objective:  Patient Vitals for the past 24 hrs:   BP Temp Temp src Pulse Resp SpO2   21 0756 151/80 97.6 °F (36.4 °C) Oral 82 16 99 %   21 0341 149/79 98.2 °F (36.8 °C) Oral 83 18 97 %   21 2337 94/56 97.9 °F (36.6 °C) Oral 79 18 97 %    06/07/21 1943 143/79 97.6 °F (36.4 °C) Oral 84 18 98 %   06/07/21 1500 136/82 97.8 °F (36.6 °C) Oral 82 18 95 %   06/07/21 1118 117/85 98.2 °F (36.8 °C) Oral 78 18 97 %     No intake or output data in the 24 hours ending 06/08/21 1045  General: awake/alert    Neck: supple, no JVD  Chest:  clear to auscultation bilaterally without respiratory distress  CVS: regular rate and rhythm  Abdominal: soft, nontender, positive bowel sounds  Extremities: no cyanosis or edema  Skin: warm and dry without rash  Neuro: no focal motor deficits    Labs:  Results from last 7 days   Lab Units 06/07/21  0449 06/06/21 0453 06/04/21  0449   WBC 10*3/mm3 4.10 3.53 2.79*   HEMOGLOBIN g/dL 10.4* 10.0* 10.2*   HEMATOCRIT % 33.0* 31.3* 31.2*   PLATELETS 10*3/mm3 107* 118* 146         Results from last 7 days   Lab Units 06/08/21  0414 06/07/21  0449 06/06/21  0453 06/02/21  0318 06/01/21  1910 06/01/21  1759   SODIUM mmol/L 128* 130* 134* 131* 131* 133*   POTASSIUM mmol/L 3.9 3.7 3.1* 3.6 3.6 3.7   CHLORIDE mmol/L 88* 90* 94* 92* 89* 91*   CO2 mmol/L 24.0 24.0 26.0 29.0 26.0 28.0   BUN mg/dL 30* 21 14 25* 24* 23   CREATININE mg/dL 4.66* 4.02* 2.93* 3.90* 3.42* 3.30*   CALCIUM mg/dL 9.7 9.8 9.6 8.3* 9.1 9.1   BILIRUBIN mg/dL  --   --   --  0.3 0.4 0.4   ALK PHOS U/L  --   --   --  206* 260* 263*   ALT (SGPT) U/L  --   --   --  14 17 17   AST (SGOT) U/L  --   --   --  17 21 20   GLUCOSE mg/dL 108* 169* 80 200* 144* 126*       Radiology:   Imaging Results (Last 72 Hours)     Procedure Component Value Units Date/Time    XR Chest 1 View [[id number]] Collected: 06/07/21 0709     Updated: 06/07/21 0714    Narrative:      EXAMINATION: XR CHEST 1 VW- 6/7/2021 7:09 AM CDT     HISTORY: central line placement verification; R41.82-Altered mental  status, unspecified; E16.2-Hypoglycemia, unspecified; J69.0-Pneumonitis  due to inhalation of food and vomit; R13.10-Dysphagia, unspecified;  Z78.9-Other specified health status; Z74.09-Other reduced  mobility.     REPORT: A frontal view the chest was obtained.     COMPARISON: Chest x-ray 6/1/2021.     The tip of the right internal jugular central line remains in  satisfactory position at the proximal SVC. There is continued stable  consolidation of the right base with obscuration of the diaphragm and  right heart margin, with a moderate size right pleural effusion.  Underlying pneumonia and/or atelectasis are possibilities. The left lung  shows no focal infiltrates, there is mild interstitial prominence. Mild  central vascular crowding is noted. Heart size is normal. There is a  stent in the left subclavian region as before. No pneumothorax is  identified.       Impression:      Stable chest x-ray, the right IJ central line tip remains in  satisfactory position over the proximal SVC. Continued consolidation of  the right lung base with effusion and atelectasis, underlying pneumonia  is not excluded.  This report was finalized on 06/07/2021 07:11 by Dr. Colton Hampton MD.          Culture:  Blood Culture   Date Value Ref Range Status   06/01/2021 No growth at 24 hours  Preliminary   06/01/2021 Abnormal Stain (C)  Preliminary         Assessment   1.  End stage renal disease on HD TTS  2.  Metabolic encephalopathy  3.  Sepsis  4.  Type 2 diabetes with hypoglycemia  5.  Hypertension  6.  Anemia of CKD  7.  COPD  7.  Hyponatremia     Plan:  1.  Dialysis today  2.  Monitor labs  3.  OK for discharge from renal standpoint; follow up will be via dialysis clinic    ANTHONY Reid  6/8/2021  10:45 CDT

## 2021-06-08 NOTE — PLAN OF CARE
Goal Outcome Evaluation:      A&Ox4.  Confused.  Pulling.  Getting up.  Pulled dressing off IOJ.  Dressing replaced.  Up in chair tonight.  C/O pain treated with PO prn Tylenol with good results.  Sling to right arm in place.  Tele in place.  NS HR 80.  RA.  .  SCD. Dialysis scheduled today.  Not able to rest tonight. Blood glucose monitoring.  Will continue to monitor.

## 2021-06-09 NOTE — THERAPY DISCHARGE NOTE
Acute Care - Physical Therapy Discharge Summary  [facility]       Patient Name: Mini Gentile  : 1960  MRN: [id number]    Today's Date: 2021                 Admit Date: 2021      PT Recommendation and Plan    Visit Dx:    ICD-10-CM ICD-9-CM   1. Altered mental status, unspecified altered mental status type  R41.82 780.97   2. Hypoglycemia  E16.2 251.2   3. Aspiration pneumonia, unspecified aspiration pneumonia type, unspecified laterality, unspecified part of lung (CMS/[facility])  J69.0 507.0   4. Dysphagia, unspecified type  R13.10 787.20   5. Decreased activities of daily living (ADL)  Z78.9 V49.89   6. Impaired mobility  Z74.09 799.89       Outcome Measures     Row Name 21 1400             How much help from another is currently needed...    Putting on and taking off regular lower body clothing?  2  -TS      Bathing (including washing, rinsing, and drying)  3  -TS      Toileting (which includes using toilet bed pan or urinal)  3  -TS      Putting on and taking off regular upper body clothing  3  -TS      Taking care of personal grooming (such as brushing teeth)  3  -TS      Eating meals  4  -TS      AM-PAC 6 Clicks Score (OT)  18  -TS        User Key  (r) = Recorded By, (t) = Taken By, (c) = Cosigned By    Initials Name Provider Type    TS Bijal Lindquist, SEFERINO/L Occupational Therapy Assistant              PT Rehab Goals     Row Name 21 1021             Bed Mobility Goal 1 (PT)    Activity/Assistive Device (Bed Mobility Goal 1, PT)  bed mobility activities, all  -AB      [place] Level/Cues Needed (Bed Mobility Goal 1, PT)  modified independence  -AB      Time Frame (Bed Mobility Goal 1, PT)  long term goal (LTG);by discharge  -AB      Progress/Outcomes (Bed Mobility Goal 1, PT)  goal not met  -AB         Transfer Goal 1 (PT)    Activity/Assistive Device (Transfer Goal 1, PT)  sit-to-stand/stand-to-sit;bed-to-chair/chair-to-bed;walker, lenore  -AB      [place] Level/Cues  Needed (Transfer Goal 1, PT)  contact guard assist;tactile cues required;verbal cues required  -AB      Time Frame (Transfer Goal 1, PT)  long term goal (LTG);by discharge  -AB      Progress/Outcome (Transfer Goal 1, PT)  goal not met  -AB         Gait Training Goal 1 (PT)    Activity/Assistive Device (Gait Training Goal 1, PT)  gait (walking locomotion);assistive device use;decrease fall risk;increase endurance/gait distance;improve balance and speed;walker, lenore  -AB      [place] Level (Gait Training Goal 1, PT)  contact guard assist;tactile cues required;verbal cues required  -AB      Distance (Gait Training Goal 1, PT)  50ft  -AB      Time Frame (Gait Training Goal 1, PT)  long term goal (LTG);by discharge  -AB      Progress/Outcome (Gait Training Goal 1, PT)  goal not met  -AB        User Key  (r) = Recorded By, (t) = Taken By, (c) = Cosigned By    Initials Name Provider Type Discipline    Fany Vaca PTA Physical Therapy Assistant PT              PT Discharge Summary  Anticipated Discharge Disposition (PT): skilled nursing facility  Reason for Discharge: Discharge from facility  Outcomes Achieved: Refer to plan of care for updates on goals achieved  Discharge Destination: SNF      Fany Beltran PTA   6/9/2021

## 2021-06-09 NOTE — THERAPY DISCHARGE NOTE
Acute Care - Occupational Therapy Discharge Summary  [facility]     Patient Name: Mini Gentile  : 1960  MRN: [id number]    Today's Date: 2021                 Admit Date: 2021        OT Recommendation and Plan    Visit Dx:    ICD-10-CM ICD-9-CM   1. Altered mental status, unspecified altered mental status type  R41.82 780.97   2. Hypoglycemia  E16.2 251.2   3. Aspiration pneumonia, unspecified aspiration pneumonia type, unspecified laterality, unspecified part of lung (CMS/[facility])  J69.0 507.0   4. Dysphagia, unspecified type  R13.10 787.20   5. Decreased activities of daily living (ADL)  Z78.9 V49.89   6. Impaired mobility  Z74.09 799.89               OT Rehab Goals     Row Name 21 0700             Transfer Goal 1 (OT)    Activity/Assistive Device (Transfer Goal 1, OT)  shower chair;commode, bedside with drop arms;commode  -TS      [place] Level/Cues Needed (Transfer Goal 1, OT)  contact guard assist  -TS      Time Frame (Transfer Goal 1, OT)  long term goal (LTG);by discharge  -TS      Progress/Outcome (Transfer Goal 1, OT)  goal not met  -TS         Dressing Goal 1 (OT)    Activity/Device (Dressing Goal 1, OT)  lower body dressing  -TS      [place]/Cues Needed (Dressing Goal 1, OT)  contact guard assist  -TS      Time Frame (Dressing Goal 1, OT)  long term goal (LTG);by discharge  -TS      Progress/Outcome (Dressing Goal 1, OT)  goal not met  -TS         Toileting Goal 1 (OT)    Activity/Device (Toileting Goal 1, OT)  toileting skills, all  -TS      [place] Level/Cues Needed (Toileting Goal 1, OT)  minimum assist (75% or more patient effort)  -TS      Time Frame (Toileting Goal 1, OT)  long term goal (LTG);by discharge  -TS      Progress/Outcome (Toileting Goal 1, OT)  goal not met  -TS        User Key  (r) = Recorded By, (t) = Taken By, (c) = Cosigned By    Initials Name Provider Type Discipline    TS Bijal Lindquist, GENTILE/L Occupational Therapy Assistant OT           Outcome Measures     Row Name 06/07/21 1400             How much help from another is currently needed...    Putting on and taking off regular lower body clothing?  2  -TS      Bathing (including washing, rinsing, and drying)  3  -TS      Toileting (which includes using toilet bed pan or urinal)  3  -TS      Putting on and taking off regular upper body clothing  3  -TS      Taking care of personal grooming (such as brushing teeth)  3  -TS      Eating meals  4  -TS      AM-PAC 6 Clicks Score (OT)  18  -TS        User Key  (r) = Recorded By, (t) = Taken By, (c) = Cosigned By    Initials Name Provider Type    Bijal Donato COTA/L Occupational Therapy Assistant          Timed Therapy Charges  Total Units: 2    Charges  Total Units: 2    Procedure Name Documented Minutes Units Code    HC OT SELF CARE/MGMT/TRAIN EA 15 MIN 23  2    19585 (CPT®)               Documented Minutes  Total Minutes: 23    Therapy Provided Minutes    41176 - OT Self Care/Mgmt Minutes 23                    OT Discharge Summary  Anticipated Discharge Disposition (OT): skilled nursing facility  Reason for Discharge: Discharge from facility  Outcomes Achieved: Refer to plan of care for updates on goals achieved  Discharge Destination: SNF      TAMIKO Bang  6/9/2021

## 2021-06-11 NOTE — THERAPY DISCHARGE NOTE
Acute Care - Speech Language Pathology Discharge Summary  [facility]       Patient Name: Mini Gentile  : 1960  MRN: [id number]    Today's Date: 2021                   Admit Date: 2021      SLP Recommendation and Plan  Reg/Thin.     Goals met on previous SLP note. Goal was to tolerate LRD without s/s of aspiration. Goal was met on . See  for goal.     Visit Dx:    ICD-10-CM ICD-9-CM   1. Altered mental status, unspecified altered mental status type  R41.82 780.97   2. Hypoglycemia  E16.2 251.2   3. Aspiration pneumonia, unspecified aspiration pneumonia type, unspecified laterality, unspecified part of lung (CMS/[facility])  J69.0 507.0   4. Dysphagia, unspecified type  R13.10 787.20   5. Decreased activities of daily living (ADL)  Z78.9 V49.89   6. Impaired mobility  Z74.09 799.89                           SLP Discharge Summary  Anticipated Discharge Disposition (SLP): skilled nursing facility  Reason for Discharge: discharge from this facility  Progress Toward Achieving Short/long Term Goals: all goals met within established timelines  Discharge Destination: SNF      Maia Gonsalez MS CCC-SLP  2021

## 2021-06-16 NOTE — TELEPHONE ENCOUNTER
Called patient to inform her that she missed her appointment today at 2 pm. Pt mailbox is full. Called Erica Bowling, a contact on her chart, SANIYA for pt regarding her missed appointment. Stressed that these appointments are important to document progress and answer any questions the pt has. Left office number for pt to return call.

## 2021-06-16 NOTE — ED PROVIDER NOTES
"Subjective   Patient is a 61-year-old female who presents to the ER today per EMS from [facility] with complaint of altered mental status.  Per nursing staff they got very little report from the nursing home.  EMS also got very little report from the nursing home staff.  The patient was apparently found lying on the floor sometime yesterday in her room.  She was then found laying face down in her bed today although she did have a sitter present at that time.  The patient was altered.  Unfortunately no one knows when her last known well time was.  The patient will answer \"yes \"when I say her name.  She does not answer other questions.  She is moaning in the bed and keeps saying \"oh my God, oh my God \".  The patient is unable to tell me where she is hurting it.  Nursing staff states that the patient seemed to note that her right arm was hurting.  She does have some bruising on her right hand and right wrist.  The patient also has some pain to her right hip and femur when I examined the area.  The patient has a history of end-stage renal disease and is on dialysis Tuesday, Thursdays, and Saturdays. We do not know if she had dialysis yesterday or not. The pt was recently admitted for AMS and hyponatremia from June 1 through June 8, 2021.  She was discharged home to [facility].  She presents here today for further evaluation.      History provided by:  Patient, EMS personnel and nursing home   used: No    Altered Mental Status  Presenting symptoms: behavior changes and confusion    Severity:  Moderate  Most recent episode: uknown.  Episode history:  Continuous  Timing:  Constant  Progression:  Unchanged  Context: nursing home resident and recent illness    Context: not alcohol use, not dementia, not drug use, not head injury, not homeless, taking medications as prescribed, not recent change in medication and not recent infection    Associated symptoms: no abdominal pain, " normal movement, no agitation, no bladder incontinence, no decreased appetite, no depression, no difficulty breathing, no eye deviation, no fever, no hallucinations, no headaches, no light-headedness, no nausea, no palpitations, no rash, no seizures, no slurred speech, no suicidal behavior, no visual change, no vomiting and no weakness        Review of Systems   Unable to perform ROS: Mental status change   Constitutional: Negative for decreased appetite and fever.   Cardiovascular: Negative for palpitations.   Gastrointestinal: Negative for abdominal pain, nausea and vomiting.   Genitourinary: Negative for bladder incontinence.   Skin: Negative for rash.   Neurological: Negative for seizures, weakness, light-headedness and headaches.   Psychiatric/Behavioral: Positive for confusion. Negative for agitation and hallucinations.       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    • Chronic otitis media    • Diabetes (CMS/[facility])    • End stage renal disease on dialysis (CMS/[facility])    • Eustachian tube dysfunction    • GERD (gastroesophageal reflux disease)    • Glaucoma    • HTN (hypertension)    • Hyperlipidemia    • Mucoid otitis media    • Noncompliance of patient with renal dialysis (CMS/[facility])    • Tobacco abuse        Allergies   Allergen Reactions   • Bupropion Nausea Only   • Chantix [Varenicline] Other (See Comments)     Does not remember   • Gabapentin Hallucinations     hallucinations   • Azithromycin Rash   • Levofloxacin Rash   • Penicillins Rash   • Sulfa Antibiotics Rash       Past Surgical History:   Procedure Laterality Date   • ARTERIOVENOUS FISTULA     • CATARACT EXTRACTION WITH INTRAOCULAR LENS IMPLANT     •  SECTION     • CHOLECYSTECTOMY     • MYRINGOTOMY W/ TUBES Bilateral    • MYRINGOTOMY W/ TUBES Right    • TUBAL ABDOMINAL LIGATION         Family History   Problem Relation Age of Onset   • Heart disease Mother    • Diabetes Father    • Colon  cancer Neg Hx    • Colon polyps Neg Hx        Social History     Socioeconomic History   • Marital status: Single     Spouse name: Not on file   • Number of children: Not on file   • Years of education: Not on file   • Highest education level: Not on file   Tobacco Use   • Smoking status: Current Every Day Smoker     Packs/day: 2.00     Years: 6.00     Pack years: 12.00     Types: Cigarettes   • Smokeless tobacco: Never Used   Vaping Use   • Vaping Use: Never used   Substance and Sexual Activity   • Alcohol use: No   • Drug use: No   • Sexual activity: Defer           Objective   Physical Exam  Vitals and nursing note reviewed.   Constitutional:       Appearance: She is ill-appearing.   HENT:      Head: Normocephalic.      Mouth/Throat:      Pharynx: Oropharynx is clear.   Eyes:      Conjunctiva/sclera: Conjunctivae normal.      Pupils: Pupils are equal, round, and reactive to light.   Neck:      Comments: Able to move all ext, neurovascularly intact  Cardiovascular:      Rate and Rhythm: Normal rate.   Pulmonary:      Effort: Pulmonary effort is normal.      Breath sounds: Normal breath sounds.   Abdominal:      General: Bowel sounds are normal.      Palpations: Abdomen is soft.   Musculoskeletal:      Cervical back: Normal range of motion and neck supple.      Comments: Ecchymosis noted to dorsal aspect right hand, and radial aspect right wrist.  Patient does wince in pain when I touch this area.  Radial pulse 2+, positive CMS, neurovascularly intact.    Pain to right lateral hip and right femur with palpation.  There is no erythema, ecchymosis or swelling noted.  Pedal pulses 2+, positive CMS, neurovascularly intact.    Fistula noted to left upper extremity.  Positive bruit noted.  Positive thrill.   Skin:     General: Skin is warm and dry.      Capillary Refill: Capillary refill takes less than 2 seconds.   Neurological:      General: No focal deficit present.   Psychiatric:         Mood and Affect: Mood normal.          Procedures           ED Course  ED Course as of Jun 16 2007 Wed Jun 16, 2021   1735 Pt PO2 54; placed on O2 via NC per RRT.     [LF]   1753 Pt only makes small amt of urine; in/out cath was ordered and nursing staff was only able to obtain very small amt of urine. Lab could only do a urine culture, drug screen or UA on this; order for urine culture at this time.     [LF]   1754 Pt case discussed with Dr. Avitia who agrees with plan of care.     [LF]   1840 CT head/C-spine shows no acute findings.     [LF]   1935 XR Hand 3+ View Right [LF]   1935 XR Wrist 3+ View Right [LF]   1935 XR Chest 1 View [LF]   2002 Pt case discussed with Dr. Kent; pt will be admitted to the ICU at this time in guarded condition.  Patient was hypoxic.  She is placed on O2 at 2 L via nasal cannula and her O2 sat is now 96%.  She continues to be altered.  Her troponin is elevated at 0.132 although it is always chronically elevated in the past.  She is anemic, however has been anemic in the past.     [LF]   2003 Procalcitonin is elevated.  I am concerned about sepsis.  She was given vancomycin as well as cefepime for sepsis.  I did use renal dosing.    [LF]   2006 Pt BP currently 112/52.     [LF]      ED Course User Index  [LF] Lia Bajwa, APRN                                   XR Wrist 3+ View Right   Final Result      XR Pelvis 1 or 2 View   Final Result      XR Hand 3+ View Right   Final Result      XR Femur 2 View Right   Final Result   1. No acute bony abnormality.                   This report was finalized on 06/16/2021 19:34 by Dr. Dharmesh Zuleta MD.      XR Chest 1 View   Final Result      CT Head Without Contrast   Final Result   1. No acute intracranial abnormality is seen.   2. Stable chronic change.                                                       This report was finalized on 06/16/2021 18:27 by Dr. Dharmesh Zuleta MD.      CT Cervical Spine Without Contrast   Final Result        Labs Reviewed   COMPREHENSIVE  METABOLIC PANEL - Abnormal; Notable for the following components:       Result Value    Glucose 113 (*)     BUN 25 (*)     Creatinine 4.06 (*)     Potassium 3.1 (*)     Chloride 95 (*)     Total Protein 5.9 (*)     AST (SGOT) 36 (*)     Alkaline Phosphatase 199 (*)     eGFR Non African Amer 11 (*)     BUN/Creatinine Ratio 6.2 (*)     All other components within normal limits    Narrative:     GFR Normal >60  Chronic Kidney Disease <60  Kidney Failure <15     PROTIME-INR - Abnormal; Notable for the following components:    Protime 18.9 (*)     INR 1.72 (*)     All other components within normal limits   APTT - Abnormal; Notable for the following components:    PTT 44.0 (*)     All other components within normal limits   TROPONIN (IN-HOUSE) - Abnormal; Notable for the following components:    Troponin T 0.136 (*)     All other components within normal limits    Narrative:     Troponin T Reference Range:  <= 0.03 ng/mL-   Negative for AMI  >0.03 ng/mL-     Abnormal for myocardial necrosis.  Clinicians would have to utilize clinical acumen, EKG, Troponin and serial changes to determine if it is an Acute Myocardial Infarction or myocardial injury due to an underlying chronic condition.       Results may be falsely decreased if patient taking Biotin.     LACTIC ACID, PLASMA - Abnormal; Notable for the following components:    Lactate 2.8 (*)     All other components within normal limits   PROCALCITONIN - Abnormal; Notable for the following components:    Procalcitonin 12.46 (*)     All other components within normal limits    Narrative:     As a Marker for Sepsis (Non-Neonates):     1. <0.5 ng/mL represents a low risk of severe sepsis and/or septic shock.  2. >2 ng/mL represents a high risk of severe sepsis and/or septic shock.    As a Marker for Lower Respiratory Tract Infections that require antibiotic therapy:  PCT on Admission     Antibiotic Therapy             6-12 Hrs later  >0.5                          Strongly  "Recommended            >0.25 - <0.5             Recommended  0.1 - 0.25                  Discouraged                       Remeasure/reassess PCT  <0.1                         Strongly Discouraged         Remeasure/reassess PCT      As 28 day mortality risk marker: \"Change in Procalcitonin Result\" (>80% or <=80%) if Day 0 (or Day 1) and Day 4 values are available. Refer to [email or web address]/    Change in PCT <=80 %   A decrease of PCT levels below or equal to 80% defines a positive change in PCT test result representing a higher risk for 28-day all-cause mortality of patients diagnosed with severe sepsis or septic shock.    Change in PCT >80 %   A decrease of PCT levels of more than 80% defines a negative change in PCT result representing a lower risk for 28-day all-cause mortality of patients diagnosed with severe sepsis or septic shock.               CBC WITH AUTO DIFFERENTIAL - Abnormal; Notable for the following components:    RBC 2.71 (*)     Hemoglobin 8.5 (*)     Hematocrit 27.3 (*)     .7 (*)     MCHC 31.1 (*)     RDW 17.7 (*)     RDW-SD 64.7 (*)     nRBC 1.1 (*)     All other components within normal limits   BLOOD GAS, ARTERIAL - Abnormal; Notable for the following components:    pH, Arterial 7.506 (*)     pO2, Arterial 54.0 (*)     HCO3, Arterial 28.3 (*)     Base Excess, Arterial 5.0 (*)     O2 Saturation, Arterial 91.5 (*)     pH, Temp Corrected 7.506 (*)     pO2, Temperature Corrected 54.0 (*)     All other components within normal limits   MANUAL DIFFERENTIAL - Abnormal; Notable for the following components:    Neutrophil % 87.0 (*)     Lymphocyte % 4.0 (*)     Monocyte % 3.0 (*)     Bands %  6.0 (*)     Lymphocytes Absolute 0.27 (*)     nRBC 2.0 (*)     All other components within normal limits   COVID-19,[facility] IN-HOUSE,[facility] SWAB NO TRANSPORT MEDIA 2 HR TAT - Normal    Narrative:     Fact sheet for providers: https://www.fda.gov/media/893647/download     Fact sheet for " patients: https://www.[employer].gov/media/556675/download    Test performed by PCR.    Consider negative results in combination with clinical observations, patient history, and epidemiological information.   CK - Normal   PHOSPHORUS - Normal   MAGNESIUM - Normal   ACETAMINOPHEN LEVEL - Normal   SALICYLATE LEVEL - Normal   BLOOD CULTURE   BLOOD CULTURE   URINE CULTURE   RAINBOW DRAW    Narrative:     The following orders were created for panel order [place] Draw.  Procedure                               Abnormality         Status                     ---------                               -----------         ------                     Green Top (Gel)[[id number]]                                  Final result               Lavender Top[[id number]]                                     Final result               Red Top[[id number]]                                          Final result                 Please view results for these tests on the individual orders.   ETHANOL    Narrative:     Not for legal purposes. Chain of Custody not followed.    BLOOD GAS, ARTERIAL   LACTIC ACID, REFLEX   GREEN TOP   LAVENDER TOP   RED TOP   CBC AND DIFFERENTIAL    Narrative:     The following orders were created for panel order CBC & Differential.  Procedure                               Abnormality         Status                     ---------                               -----------         ------                     CBC Auto Differential[[id number]]        Abnormal            Final result                 Please view results for these tests on the individual orders.             MDM  Number of Diagnoses or Management Options  Altered mental status, unspecified altered mental status type: new and requires workup  Anemia, unspecified type: new and requires workup  Elevated troponin: new and requires workup  End stage renal disease (CMS/HCC): new and requires workup  Fall, initial encounter: new and requires workup  Hypotension, unspecified  hypotension type: new and requires workup  Hypoxia: new and requires workup     Amount and/or Complexity of Data Reviewed  Clinical lab tests: reviewed and ordered  Tests in the radiology section of CPT®: ordered and reviewed  Tests in the medicine section of CPT®: ordered and reviewed  Decide to obtain previous medical records or to obtain history from someone other than the patient: yes  Discuss the patient with other providers: yes    Risk of Complications, Morbidity, and/or Mortality  General comments: Total Critical Care Time: 45 mins    Critical Care  Total time providing critical care: 30-74 minutes    Patient Progress  Patient progress: (guarded)      Final diagnoses:   Altered mental status, unspecified altered mental status type   Hypoxia   Elevated troponin   End stage renal disease (CMS/[facility])   Anemia, unspecified type   Hypotension, unspecified hypotension type   Fall, initial encounter       ED Disposition  ED Disposition     ED Disposition Condition Comment    Decision to Admit  Level of Care: Critical Care [6]   Diagnosis: Altered mental status, unspecified altered mental status type [[id number]]   Admitting Physician: SUE CARTWRIGHT [[id number]]   Attending Physician: SUE CARTWRIGHT [[id number]]   Isolate for COVID?: No [0]   Certification: I Certify That Inpatient Hospital Services Are Medically Necessary For Greater Than 2 Midnights            No follow-up provider specified.       Medication List      No changes were made to your prescriptions during this visit.          Lia Bajwa, APRN  06/16/21 2007

## 2021-06-17 PROBLEM — R19.7 DIARRHEA: Status: ACTIVE | Noted: 2021-01-01

## 2021-06-17 PROBLEM — E87.6 HYPOKALEMIA: Status: ACTIVE | Noted: 2021-01-01

## 2021-06-17 PROBLEM — A04.72 C. DIFFICILE DIARRHEA: Status: ACTIVE | Noted: 2021-01-01

## 2021-06-17 NOTE — PLAN OF CARE
Goal Outcome Evaluation:      Pt drowsy and oriented to only person. Frequent complaints of pain on right side of body, particularly right arm. Complaint of back pain intermittently. Blood pressures adequate, around 100-110 systolic. Room air with good sat. Positive for C - diff with multiple bowel movements. Continue to monitor.

## 2021-06-17 NOTE — PLAN OF CARE
Goal Outcome Evaluation:  Plan of Care Reviewed With: patient, other (see comments) (JENNIFFER Joshi)        Progress: no change (Initial Evaluation)       Clinical bedside swallow evaluation completed. The patient was alert and cooperative. Currently receiving dialysis at time of evaluation.     Primary problem: Altered mental status, Cdiff.   Medical history: Recent aspiration pneumonia, noncompliance with dialysis, see medical history for full detail.     Oral motor assessment with generalized weakness. The patient completed a full range of consistencies except for mech soft. No overt s/s of aspiration were observed. Increased prep time given regular solids.     SLP recommends:   1) Mechanical soft diet   2) Thin liquids   3) Meds whole with puree  4) Oral care, standard swallow precautions, fatigue precautions     SLP will follow up to ensure diet toleration.     Maia Gonsalez MS CCC-SLP 6/17/2021 14:39 CDT

## 2021-06-17 NOTE — PAYOR COMM NOTE
"ADMITTED 6/16/2021    [facility]  ELSI,  [phone]  OR  FAX   [phone]    Mini Peña (61 y.o. Female)     Date of Birth Social Security Number Address Home Phone MRN    1960  [address] [place] [id number] [phone] [id number]    Congregation Marital Status          Other Single       Admission Date Admission Type Admitting Provider Attending Provider Department, Room/Bed    6/16/21 Emergency Bin Guy, Bin Valiente,  [facility] CARDIAC CARE, C008/1    Discharge Date Discharge Disposition Discharge Destination                       Attending Provider: Bin Guy DO    Allergies: Bupropion, Chantix [Varenicline], Gabapentin, Azithromycin, Levofloxacin, Penicillins, Sulfa Antibiotics    Isolation: Spore   Infection: C.difficile (06/17/21)   Code Status: CPR    Ht: 147.3 cm (58\")   Wt: 69.6 kg (153 lb 7 oz)    Admission Cmt: None   Principal Problem: C. difficile diarrhea [A04.72]                 Active Insurance as of 6/16/2021     Primary Coverage     Payor Plan Insurance Group Employer/Plan Group    [facility] MEDICARE REPLACEMENT WELLCARE MEDICARE REPLACEMENT      Payor Plan Address Payor Plan Phone Number Payor Plan Fax Number Effective Dates    [address] [address] [phone]  4/1/2021 - None Entered    [facility] [id number]       Subscriber Name Subscriber Birth Date Member ID       MINI PEÑA 1960 [id number]           Secondary Coverage     Payor Plan Insurance Group Employer/Plan Group    WELLCARE [facility] WELLCARE MEDICAID      Payor Plan Address Payor Plan Phone Number Payor Plan Fax Number Effective Dates    [address] [address] [phone]  11/4/2016 - None Entered    [facility] [id number]       Subscriber Name Subscriber Birth Date Member ID       PAOLAAMANDAMINI 1960 [id number]                 Emergency Contacts      (Rel.) Home Phone Work Phone Mobile Phone    Eirca Bowling (Other) [phone] -- " "[phone]    Jose Bowling (Significant Other) [phone] -- [phone]    Arlette Barker (Daughter) -- -- [phone]    Julio Cesar Gentile (Son) [phone] -- [phone]           Patient Care Timeline (2021 16:55 to 2021 07:15)     2021 Event Details User   16:55 Patient arrival   Jessie Villalta RN   16:55 Patient roomed in ED To room 03 Jessie Villalta RN   16:56 HPI HPI (Adult)  Stated Reason for Visit: Patient arrived via [facility] from [place]. Per staff patient has altered mental status. Staff states that the a Jessie Villalta RN   16:56:02 Arrival Complaint ams      16:56:20 Chief Complaints Updated Altered Mental Status   Jessie Villalta RN   16:56:20 Trigger for Triage Start   Jessie Villalta RN   16:56:20 Triage Started   Jessie Villalta RN   16:57:13 Assign Nurse Jessie Schmitt RN assigned as Registered Nurse Jessie Schmitt RN   17:00 Vital Signs Vital Signs  Heart Rate: 97  Heart Rate Source: Monitor  Resp: 22  Resp Rate Source: Visual  BP: 127/53 (Device Time: 17:00:30)  BP Location: Right arm  BP Method: Automatic  Patient Position: Lying  BMI (Calculated): 30  Oxygen Therapy  SpO2: 93 %  Pulse Oximetry Type: Continuous  Device (Oxygen Therapy): room air  Vitals Timer  Restart Vitals Timer: Yes  Height and Weight  Height: 147.3 cm (58\")  Height Method: Stated  Weight: 65.1 kg (143 lb 8 oz)  Weight Method: Bed scale  Other flowsheet entries  Ideal Body Weight k.9 Jessie Schmitt RN      17:09:53 Neuro Cognitive (Adult) Neuro Cognitive (Adult)  Cognitive/Neuro/Behavioral WDL: WDL except; all  Level of Consciousness: Alert  Arousal Level: opens eyes spontaneously  Orientation: disoriented to; person; time; place; situation  Mood/Behavior: anxious  Additional Documentation: Raisa Coma Scale (Group); NIH Stroke Scale (group)  Motor Response  Motor Response: general motor response  General Motor Response: purposeful motor response  [place] Coma Scale  Best Eye Response: " 4-->(E4) spontaneous  Best Motor Response: 5-->(M5) localizes pain  Best Verbal Response: 4-->(V4) confused  Raisa Coma Scale Score: 13  NIH Stroke Scale  Interval: baseline  1a. Level of Consciousness: 0-->Alert, keenly responsive  1b. LOC Questions: 2-->Answers neither question correctly  1c. LOC Commands: 2-->Performs neither task correctly  2. Best Gaze: 0-->Normal  3. Visual: 0-->No visual loss  4. Facial Palsy: 0-->Normal symmetrical movements  5a. Motor Arm, Left: 3-->No effort against gravity, limb falls  5b. Motor Arm, Right: 3-->No effort against gravity, limb falls  6a. Motor Leg, Left: 3-->No effort against gravity, leg falls to bed immediately  6b. Motor Leg, Right: 3-->No effort against gravity, leg falls to bed immediately  7. Limb Ataxia: 2-->Present in two limbs  8. Sensory: 0-->Normal, no sensory loss  9. Best Language: 0-->No aphasia, normal  10. Dysarthria: 0-->Normal  11. Extinction and Inattention (formerly Neglect): 0-->No abnormality  Total (NIH Stroke Scale): 18 Jessie Schmitt RN      17:11:02 Peripheral Neurovascular (Adult) Peripheral Neurovascular (Adult)  Peripheral Neurovascular WDL: WDL except; pulse assessment  Pulse Assessment: radial; dorsalis pedis  Additional Documentation: Edema (Group)  Pulse Radial  Left Radial Pulse: 2+ (normal); palpation  Right Radial Pulse: 2+ (normal); palpation  Pulse Dorsalis Pedis  Left Dorsalis Pedis Pulse: 1+ (weak); palpation  Right Dorsalis Pedis Pulse: 1+ (weak); palpation  Edema  Edema: foot, left; foot, right; leg, left; leg, right  Leg, Left Edema: 2+ (Mild)  Leg, Right Edema: 2+ (Mild)  Foot, Left Edema: 2+ (Mild)  Foot, Right Edema: 2+ (Mild) Jessie Schmitt RN      17:11:31 Respiratory Respiratory  Additional Documentation: Breath Sounds (Group)  Respiratory WDL  Respiratory WDL: WDL except; rhythm/pattern; expansion/retractions  Rhythm/Pattern, Respiratory: shortness of breath; tachypneic  Expansion/Accessory Muscles/Retractions: no  retractions; no use of accessory muscles  Breath Sounds  Breath Sounds: All Fields  All Lung Fields Breath Sounds: Anterior:; Lateral:; diminished Jessie Schmitt, RN      17:53 Free Text Pt only makes small amt of urine; in/out cath was ordered and nursing staff was only able to obtain very small amt of urine. Lab could only do a urine culture, drug screen or UA on this; order for urine culture at this time.  Lia Bajwa APRN   17:53:14 Orders Placed cefepime (MAXIPIME) 1 g/100 mL 0.9% NS IVPB (mbp) ; vancomycin (VANCOCIN) in iso-osmotic dextrose IVPB 1 g (premix) 200 mL Lia Bajwa APRN   17:53:15 ED Medication Ordered  VANCOMYCIN HCL IN DEXTROSE 1-5 GM/200ML-% IV SOLN, CEFEPIME 1 G/100 ML 0.9% NS IVPB (MBP) Lia Bajwa APRN   17:54 Free Text Pt case discussed with Dr. Avitia who agrees with plan of care.  Lia Bajwa APRN      18:10 Respiratory Interventions (Adult) Respiratory Interventions (Adult)  Additional Documentation: Oxygen Therapy (Group)  Oxygen Therapy  Flow (L/min): 2  Device (Oxygen Therapy): nasal cannula Jessie Schmitt RN      18:37 Medication New Bag cefepime (MAXIPIME) 1 g/100 mL 0.9% NS IVPB (mbp) - Dose: 1 g ; Rate: 200 mL/hr ; Route: Intravenous ; Line: Peripheral IV 06/16/21 1749 Right Antecubital ; Scheduled Time: 1800 Jessie Schmitt, RN      19:30 Medication New Bag vancomycin (VANCOCIN) in iso-osmotic dextrose IVPB 1 g (premix) 200 mL - Dose: 1,000 mg ; Route: Intravenous ; Line: Peripheral IV 06/16/21 1749 Right Antecubital ; Scheduled Time: 1800 Beth Camarillo RN      20:02 Free Text Pt case discussed with Dr. Kent; pt will be admitted to the ICU at this time in guarded condition.  Patient was hypoxic.  She is placed on O2 at 2 L via nasal cannula and her O2 sat is now 96%.  She continues to be altered.  Her troponin is elevated at 0.132 although it is always chronically elevated in the past.  She is anemic, however has been anemic in the past.   Lia Bajwa, APRN   20:03 Free Text Procalcitonin is elevated.  I am concerned about sepsis.  She was given vancomycin as well as cefepime for sepsis.  I did use renal dosing. Lia Bajwa, APRN      03:08 Medication New Bag lactated ringers infusion - Dose: 50 mL/hr ; Rate: 50 mL/hr ; Route: Intravenous ; Line: Peripheral IV 06/16/21 1749 Right Antecubital ; Scheduled Time: 0234 Feng Lozano RN      04:23 Medication New Bag potassium chloride 20 mEq in 100 mL IVPB - Dose: 20 mEq ; Rate: 50 mL/hr ; Route: Intravenous ; Line: Peripheral IV 06/16/21 1749 Right Antecubital ; Scheduled Time: 0415 Feng Lozano RN      06:33 Medication New Bag potassium chloride 20 mEq in 100 mL IVPB - Dose: 20 mEq ; Rate: 50 mL/hr ; Route: Intravenous ; Line: Peripheral IV 06/16/21 1749 Right Antecubital ; Scheduled Time: 0615 Feng Lozano RN               07:15 Critical Care Adult PCS Body System Pain/Comfort/Sleep  Preferred Pain Scale: CPOT (Critical-Care Pain Observation Tool)  CPOT Facial Expression: 0-->relaxed, neutral  CPOT Body Movements: 0-->absence of movements  CPOT Muscle Tension: 0-->relaxed  Ventilator Compliance/Vocalization: 0-->talking in normal tone or no sound  CPOT Score: 0  Sleep/Rest/Relaxation: no problem identified; sleeping between care  Coping/Psychosocial  Observed Emotional State: calm; cooperative  Trust Relationship/Rapport: care explained  HEENT  HEENT WDL: WDL  Mouth/Teeth WDL  Mouth/Teeth WDL: WDL except; teeth  Teeth Symptoms: tooth/teeth missing  Cognitive  Cognitive/Neuro/Behavioral WDL: WDL except; all  Level of Consciousness: Responds to Voice  Arousal Level: opens eyes spontaneously  Orientation: disoriented to; time; situation  Mood/Behavior: calm; cooperative  Pupils  Pupil PERRLA: yes  Raisa Coma Scale  Best Eye Response: 4-->(E4) spontaneous  Best Motor Response: 6-->(M6) obeys commands  Best Verbal Response: 4-->(V4) confused  Raisa Coma Scale Score: 14  Motor  Response  Motor Response: general motor response  General Motor Response: purposeful motor response  Hand /Ankle Strength  Hand , Left: moderate  Hand , Right: moderate  Dorsiflexion, Left: weak  Dorsiflexion, Right: weak  Plantarflexion, Left: weak  Plantarflexion, Right: weak  Intellectual Performance WDL  Level of Consciousness: Responds to Voice  Respiratory  Respiratory WDL: WDL except; breath sounds; effort/expansion; rhythm/pattern  Breath Sounds  Breath Sounds: All Fields  All Lung Fields Breath Sounds: Anterior:; Lateral:; diminished  Oxygen Therapy  Device (Oxygen Therapy): room air  Cardiac  Cardiac WDL: WDL  ECG  Lead Monitored: Lead II  Rhythm: normal sinus rhythm  Peripheral Neurovascular  Peripheral Neurovascular WDL: WDL  Pulse Assessment: radial; dorsalis pedis  VTE Prevention/Management: bilateral; sequential compression devices on  Pulse Radial  Left Radial Pulse: 2+ (normal); palpation  Right Radial Pulse: 2+ (normal); palpation  Pulse Dorsalis Pedis  Left Dorsalis Pedis Pulse: 1+ (weak); palpation  Right Dorsalis Pedis Pulse: 1+ (weak); palpation  Pulse Posterior Tibial  Left Posterior Tibial Pulse: 1+ (weak); palpation  Right Posterior Tibial Pulse: 1+ (weak); palpation  Edema  Edema: foot, left; foot, right; leg, left; leg, right  Leg, Left Edema: 2+ (Mild)  Leg, Right Edema: 2+ (Mild)  Foot, Left Edema: 2+ (Mild)  Foot, Right Edema: 2+ (Mild)  Gastrointestinal  Gastrointestinal WDL: WDL except; stool; GI symptoms  GI Signs/Symptoms: diarrhea  Last Bowel Movement: 06/17/21  Genitourinary  Genitourinary WDL: WDL except; voiding ability/characteristics  Voiding Characteristics: incontinence  Skin  Skin WDL: WDL except; color; characteristics  Skin Color/Characteristics: bronze  Skin Integrity: wound; pressure injury  Rogelio Risk Assessment  Sensory Perception: 3-->slightly limited  Moisture: 2-->very moist  Activity: 1-->bedfast  Mobility: 2-->very limited  Nutrition: 2-->probably  inadequate  Friction and Shear: 2-->potential problem  Rogelio Score: 12  Skin Interventions  Pressure Reduction Devices: pressure-redistributing mattress utilized  Pressure Reduction Techniques: weight shift assistance provided; heels elevated off bed  Skin Protection: adhesive use limited; incontinence pads utilized  Musculoskeletal  Musculoskeletal WDL: WDL except; extremity movement; mobility  General Mobility: generalized weakness  Extremity Movement: RUE  RUE Extremity Movement: active ROM moderately impaired  Nutrition  Diet/Nutrition Received: NPO  Sepsis Screening Options  Which Sepsis Screen to be Used?: Adult Sepsis Screen  Adult Sepsis Screening Tool  Previous adult screen positive in last 48 hrs?: no  1. Criteria Present: acute mental status change  Are 2 or > of the above criteria present?: no: STOP/negative screen  Safety  Safety WDL: WDL  [facility] High Risk Falls Assessment (If Fall score is >/=13, add the Fall Risk CPG to the care plan)   Fallen in past 6 months: 5--> Yes  Mental Status: 2--> confused  Elimination: 0--> No elimination issues  Mobility: 2--> Requires assistance- transfer, walker, etc.  Medications: 1--> Insulin/ Oral hypoglycemic  Nurses' Clinical Judgement: 9  Total Fall Risk Score: 20  Safety Management  Safety Promotion/Fall Prevention: safety round/check completed; fall prevention program maintained  Enhanced Safety Measures: bed alarm set  Medication Review/Management: medications reviewed  Daily Care  Activity Management: activity adjusted per tolerance  Positioning  Body Position: supine Adi Vásquez Daniel, RN   Registered Nurse       Plan of Care    Signed    Date of Service:  06/17/21 0816    Creation Time:  06/17/21 0816               Signed               Goal Outcome Evaluation:   Pt drowsy and oriented to only person. Frequent complaints of pain on right side of body, particularly right arm. Complaint of back pain intermittently. Blood  pressures adequate, around 100-110 systolic. Room air with good sat. Positive for C - diff with multiple bowel movements. Continue to monitor.                      Trish Brandt   Registered Dietitian   Nutrition   Plan of Care    Signed    Date of Service:  06/17/21 1011    Creation Time:  06/17/21 1011               Signed               Goal Outcome Evaluation:  Plan of Care Reviewed With: other (see comments)  Progress: no change  Outcome Summary: Initial nutrition assessment. Pt is admitted with AMS. She is currently NPO d/t failed RN dysphagia screen. She has a pending ST consult. She was recently treated for aspiration PNA with IV ATB's.  She has ESRD and receives HD 3x/week. Noted non-compliance with HD at times. Pt currently also has C.Diff and has had multiple BM's. She resides at a SNF. Will follow for ST diet consistency recommendations.                                      History & Physical      Tejinder Kent MD at 06/16/21 2350                [facility]  HISTORY AND PHYSICAL    Date of Admission: 6/16/2021  Primary Care Physician: Bharati Dahl APRN    Subjective     Chief Complaint: Altered    History of Present Illness  Patient is a 61-year-old white female past medical history of end-stage renal disease on Tuesday Thursday Saturday dialysis.  She has a significant history of noncompliance with dialysis sessions.  She does reside in a nursing home she was sent over here from [place] with altered mental status.  All the history of obtained is from the ER chart and discussing with the ER attending.  Apparently she was found laying face down in her bed today confused unsure what time she was last known well.  Patient will give me any history she does states she has diarrhea otherwise just moaning in bed.  She is had extensive work-up in the ER including CTs which show nothing acute she does have known pelvic rim fractures which are old.   White counts normal creatinine is 4, she is anemic with hemoglobin 8.5 approximately baseline.  Troponin is chronically elevated she does have a significantly elevated procalcitonin at 12.46, she is also hypokalemic with potassium 3.1, mag and Phos are normal.  A urine was obtained but only enough was available to either do a UDS, urinalysis, or urine culture therefore culture is pending.  She is somewhat hypotensive at times concerning for sepsis.  She has recently been on IV antibiotics for an aspiration pneumonia.  Unable to determine if the diarrhea is chronic there is some documentation of that or if this is a recurrence.  I have ordered a stool C. difficile and PCR panel.  She is being admitted with altered mental status and possible sepsis.        Review of Systems   Unable to obtain patient is obtunded.  Otherwise complete ROS reviewed and negative except as mentioned in the HPI.    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    • Chronic otitis media    • Diabetes (CMS/[facility])    • End stage renal disease on dialysis (CMS/[facility])    • Eustachian tube dysfunction    • GERD (gastroesophageal reflux disease)    • Glaucoma    • HTN (hypertension)    • Hyperlipidemia    • Mucoid otitis media    • Noncompliance of patient with renal dialysis (CMS/[facility])    • Tobacco abuse      Past Surgical History:  Past Surgical History:   Procedure Laterality Date   • ARTERIOVENOUS FISTULA     • CATARACT EXTRACTION WITH INTRAOCULAR LENS IMPLANT     •  SECTION     • CHOLECYSTECTOMY     • MYRINGOTOMY W/ TUBES Bilateral    • MYRINGOTOMY W/ TUBES Right    • TUBAL ABDOMINAL LIGATION       Social History:  reports that she has been smoking cigarettes. She has a 12.00 pack-year smoking history. She has never used smokeless tobacco. She reports that she does not drink alcohol and does not use drugs.    Family History: family history includes Diabetes in her father;  Heart disease in her mother.       Allergies:  Allergies   Allergen Reactions   • Bupropion Nausea Only   • Chantix [Varenicline] Other (See Comments)     Does not remember   • Gabapentin Hallucinations     hallucinations   • Azithromycin Rash   • Levofloxacin Rash   • Penicillins Rash   • Sulfa Antibiotics Rash       Medications:  Prior to Admission medications    Medication Sig Start Date End Date Taking? Authorizing Provider   albuterol sulfate  (90 Base) MCG/ACT inhaler Inhale 2 puffs Every 4 (Four) Hours As Needed for Wheezing.  Patient taking differently: Inhale 2 puffs Every 6 (Six) Hours As Needed for Wheezing. 1/21/20   Sundeep Aldridge MD   aspirin 81 MG EC tablet Take 81 mg by mouth Daily.    ProviderSteve MD   atropine 1 % ophthalmic solution Administer 1 drop into the left eye Daily.    Steve Warren MD   brimonidine (ALPHAGAN) 0.2 % ophthalmic solution Administer 1 drop into the left eye 2 (Two) Times a Day As Needed.    Steve Warren MD   carvedilol (COREG) 6.25 MG tablet Take 6.25 mg by mouth 2 (Two) Times a Day With Meals.    Steve Warren MD   cholecalciferol (VITAMIN D3) 1000 units tablet Take 2,000 Units by mouth Daily.    Steve Warren MD   dorzolamide (TRUSOPT) 2 % ophthalmic solution Administer 1 drop into the left eye 2 (Two) Times a Day.    Steve Warren MD   epoetin vika-epbx (RETACRIT) 80303 UNIT/ML injection Inject 1 mL under the skin into the appropriate area as directed 3 (Three) Times a Week. Indications: ESRD on Dialysis 6/9/21   Sundeep Aldridge MD   famotidine (PEPCID) 20 MG tablet Take 1 tablet by mouth Daily. 6/8/21   Sundeep Aldridge MD   Ferric Citrate 1  MG(Fe) tablet Take 4 tablets by mouth 3 (Three) Times a Day With Meals. 6/8/21   Sundeep Aldridge MD   fluticasone (FLONASE) 50 MCG/ACT nasal spray 2 sprays into the nostril(s) as directed by provider 2 (Two) Times a Day.     "ProviderSteve MD   insulin lispro (humaLOG) 100 UNIT/ML injection Inject 2-7 Units under the skin into the appropriate area as directed 3 (Three) Times a Day Before Meals. 6/8/21   Sundeep Aldridge MD   ipratropium-albuterol (DUO-NEB) 0.5-2.5 mg/3 ml nebulizer Take 3 mL by nebulization Every 4 (Four) Hours As Needed for Wheezing or Shortness of Air.    Steve Warren MD   lactobacillus acidophilus (RISAQUAD) capsule capsule Take 1 capsule by mouth Daily. 6/8/21   Sundeep Aldridge MD   metoclopramide (REGLAN) 5 MG tablet Take 1 tablet by mouth 4 (Four) Times a Day Before Meals & at Bedtime. 6/8/21   Sundeep Aldridge MD   ondansetron (ZOFRAN) 4 MG tablet Take 1 tablet by mouth Every 6 (Six) Hours As Needed for Nausea or Vomiting. 6/8/21   Sundeep Aldridge MD   pravastatin (PRAVACHOL) 10 MG tablet Take 10 mg by mouth Every Night.    Steve Warren MD   sertraline (ZOLOFT) 25 MG tablet Take 1 tablet by mouth Daily. 6/8/21   Sundeep Aldridge MD   timolol (TIMOPTIC) 0.5 % ophthalmic solution Administer 1 drop into the left eye Every Morning. 6/8/21   Sundeep Aldridge MD     I have utilized all available immediate resources to obtain, update, and review the patient's current medications.    Objective     Vital Signs: BP 92/53   Pulse 75   Temp 100.2 °F (37.9 °C) (Oral)   Resp 22   Ht 147.3 cm (58\")   Wt 65.1 kg (143 lb 8 oz)   SpO2 96%   BMI 29.99 kg/m²   Physical Exam   Gen.: Chronically ill-appearing white female in no acute distress moaning in the bed  HEENT: Atraumatic, normocephalic.  Pupils equal, round, and reactive to light. Extraocular movements intact.  Sclerae anicteric.  External ears negative.  Mucous membranes moist.  Neck is supple without lymphadenopathy.  No JVD is noted.  No carotid bruits are auscultated.  Oropharynx is without erythema or exudate.   Chest: Clear to auscultation and percussion.  CV: Regular rate and " rhythm.  Normal S1-S2.  No gallops, murmurs. or rubs.  Abdomen: Soft, nontender, nondistended.  Active bowel sounds,  No hepatosplenomegaly.  No masses.  No hernias.  Liquid stool in diaper  Extremities: No clubbing, edema, or cyanosis.  Capillary refill is normal.  Pulses are 2+ and symmetric at radial and dorsalis pedis.  Posterior tibial pulses are intact and 2+ palpable.  Neuro: Patient is arousable but obtunded x 0.  Cranial nerves II through XII are grossly intact.  Motor is moves all extremities.  DTRs are 2+ and symmetric bilaterally. Sensory exam is nonfocal  Skin: Warm, dry, and intact.  No evidence of breakdown.  Sensorium: Patient is obtunded    Nursing notes and vital signs reviewed        Results Reviewed:  Lab Results (last 24 hours)     Procedure Component Value Units Date/Time    Clostridium Difficile Toxin - Stool, Per Rectum [[id number]] Collected: 06/17/21 0212    Specimen: Stool from Per Rectum Updated: 06/17/21 0217    Narrative:      The following orders were created for panel order Clostridium Difficile Toxin - Stool, Per Rectum.  Procedure                               Abnormality         Status                     ---------                               -----------         ------                     Clostridium Difficile To...[[id number]]                      In process                   Please view results for these tests on the individual orders.    Gastrointestinal Panel, PCR - Stool, Per Rectum [[id number]] Collected: 06/17/21 0212    Specimen: Stool from Per Rectum Updated: 06/17/21 0217    Clostridium Difficile Toxin, PCR - Stool, Per Rectum [[id number]] Collected: 06/17/21 0212    Specimen: Stool from Per Rectum Updated: 06/17/21 0217    Timed Lactic Acid, Reflex [[id number]]  (Abnormal) Collected: 06/16/21 2050    Specimen: Blood Updated: 06/16/21 2119     Lactate 2.3 mmol/L     COVID-19,[facility] IN-HOUSE,Nasal Swab No Transport Media 3-4 HR TAT - Swab, Nasal Cavity [[id number]]  (Normal)  Collected: 06/16/21 1813    Specimen: Swab from Nasal Cavity Updated: 06/16/21 1912     COVID19 Not Detected    Narrative:      Fact sheet for providers: https://www.fda.gov/media/301340/download     Fact sheet for patients: https://www.fda.gov/media/059940/download    Test performed by PCR.    Consider negative results in combination with clinical observations, patient history, and epidemiological information.    Manual Differential [[id number]]  (Abnormal) Collected: 06/16/21 1706    Specimen: Blood Updated: 06/16/21 1839     Neutrophil % 87.0 %      Lymphocyte % 4.0 %      Monocyte % 3.0 %      Bands %  6.0 %      Neutrophils Absolute 6.18 10*3/mm3      Lymphocytes Absolute 0.27 10*3/mm3      Monocytes Absolute 0.20 10*3/mm3      nRBC 2.0 /100 WBC      Anisocytosis Slight/1+     Macrocytes Slight/1+     Polychromasia Slight/1+     WBC Morphology Normal     Platelet Estimate Adequate    CBC & Differential [[id number]]  (Abnormal) Collected: 06/16/21 1706    Specimen: Blood Updated: 06/16/21 1839    Narrative:      The following orders were created for panel order CBC & Differential.  Procedure                               Abnormality         Status                     ---------                               -----------         ------                     CBC Auto Differential[[id number]]        Abnormal            Final result                 Please view results for these tests on the individual orders.    CBC Auto Differential [[id number]]  (Abnormal) Collected: 06/16/21 1706    Specimen: Blood Updated: 06/16/21 1839     WBC 6.65 10*3/mm3      RBC 2.71 10*6/mm3      Hemoglobin 8.5 g/dL      Hematocrit 27.3 %      .7 fL      MCH 31.4 pg      MCHC 31.1 g/dL      RDW 17.7 %      RDW-SD 64.7 fl      MPV 9.5 fL      Platelets 145 10*3/mm3      nRBC 1.1 /100 WBC     Lactic Acid, Plasma [[id number]]  (Abnormal) Collected: 06/16/21 1749    Specimen: Blood Updated: 06/16/21 1818     Lactate 2.8 mmol/L     [place] [facility]  [[id number]] Collected: 06/16/21 1706    Specimen: Blood Updated: 06/16/21 1817    Narrative:      The following orders were created for panel order [place] [facility].  Procedure                               Abnormality         Status                     ---------                               -----------         ------                     Green Top (Gel)[[id number]]                                  Final result               Lavender Top[[id number]]                                     Final result               Red Top[[id number]]                                          Final result                 Please view results for these tests on the individual orders.    Green Top (Gel) [[id number]] Collected: 06/16/21 1706    Specimen: Blood Updated: 06/16/21 1817     Extra Tube Hold for add-ons.     Comment: Auto resulted.       Lavender Top [[id number]] Collected: 06/16/21 1706    Specimen: Blood Updated: 06/16/21 1817     Extra Tube hold for add-on     Comment: Auto resulted       Red Top [[id number]] Collected: 06/16/21 1706    Specimen: Blood Updated: 06/16/21 1817     Extra Tube Hold for add-ons.     Comment: Auto resulted.       Blood Culture - Blood, Arm, Right [[id number]] Collected: 06/16/21 1740    Specimen: Blood from Arm, Right Updated: 06/16/21 1802    Blood Culture - Blood, Arm, Right [[id number]] Collected: 06/16/21 1749    Specimen: Blood from Arm, Right Updated: 06/16/21 1802    Urine Culture - Urine, Urine, Catheter [[id number]] Collected: 06/16/21 1730    Specimen: Urine, Catheter Updated: 06/16/21 1754    Troponin [[id number]]  (Abnormal) Collected: 06/16/21 1706    Specimen: Blood Updated: 06/16/21 1751     Troponin T 0.136 ng/mL     Narrative:      Troponin T Reference Range:  <= 0.03 ng/mL-   Negative for AMI  >0.03 ng/mL-     Abnormal for myocardial necrosis.  Clinicians would have to utilize clinical acumen, EKG, Troponin and serial changes to determine if it is an Acute Myocardial Infarction or myocardial  "injury due to an underlying chronic condition.       Results may be falsely decreased if patient taking Biotin.      Protime-INR [[id number]]  (Abnormal) Collected: 06/16/21 1725    Specimen: Blood Updated: 06/16/21 1749     Protime 18.9 Seconds      INR 1.72    aPTT [[id number]]  (Abnormal) Collected: 06/16/21 1725    Specimen: Blood Updated: 06/16/21 1749     PTT 44.0 seconds     Procalcitonin [[id number]]  (Abnormal) Collected: 06/16/21 1706    Specimen: Blood Updated: 06/16/21 1745     Procalcitonin 12.46 ng/mL     Narrative:      As a Marker for Sepsis (Non-Neonates):     1. <0.5 ng/mL represents a low risk of severe sepsis and/or septic shock.  2. >2 ng/mL represents a high risk of severe sepsis and/or septic shock.    As a Marker for Lower Respiratory Tract Infections that require antibiotic therapy:  PCT on Admission     Antibiotic Therapy             6-12 Hrs later  >0.5                          Strongly Recommended            >0.25 - <0.5             Recommended  0.1 - 0.25                  Discouraged                       Remeasure/reassess PCT  <0.1                         Strongly Discouraged         Remeasure/reassess PCT      As 28 day mortality risk marker: \"Change in Procalcitonin Result\" (>80% or <=80%) if Day 0 (or Day 1) and Day 4 values are available. Refer to http://www.[employer]-pct-calculator.com/    Change in PCT <=80 %   A decrease of PCT levels below or equal to 80% defines a positive change in PCT test result representing a higher risk for 28-day all-cause mortality of patients diagnosed with severe sepsis or septic shock.    Change in PCT >80 %   A decrease of PCT levels of more than 80% defines a negative change in PCT result representing a lower risk for 28-day all-cause mortality of patients diagnosed with severe sepsis or septic shock.                Comprehensive Metabolic Panel [[id number]]  (Abnormal) Collected: 06/16/21 1706    Specimen: Blood Updated: 06/16/21 1741     Glucose 113 " mg/dL      BUN 25 mg/dL      Creatinine 4.06 mg/dL      Sodium 137 mmol/L      Potassium 3.1 mmol/L      Chloride 95 mmol/L      CO2 28.0 mmol/L      Calcium 9.1 mg/dL      Total Protein 5.9 g/dL      Albumin 3.50 g/dL      ALT (SGPT) 19 U/L      AST (SGOT) 36 U/L      Alkaline Phosphatase 199 U/L      Total Bilirubin 0.6 mg/dL      eGFR Non African Amer 11 mL/min/1.73      Comment: <15 Indicative of kidney failure.        eGFR   Amer --     Comment: <15 Indicative of kidney failure.        Globulin 2.4 gm/dL      A/G Ratio 1.5 g/dL      BUN/Creatinine Ratio 6.2     Anion Gap 14.0 mmol/L     Narrative:      GFR Normal >60  Chronic Kidney Disease <60  Kidney Failure <15      CK [[id number]]  (Normal) Collected: 06/16/21 1706    Specimen: Blood Updated: 06/16/21 1740     Creatine Kinase 37 U/L     Salicylate Level [[id number]]  (Normal) Collected: 06/16/21 1706    Specimen: Blood Updated: 06/16/21 1739     Salicylate <0.3 mg/dL     Acetaminophen Level [[id number]]  (Normal) Collected: 06/16/21 1706    Specimen: Blood Updated: 06/16/21 1739     Acetaminophen <5.0 mcg/mL     Phosphorus [[id number]]  (Normal) Collected: 06/16/21 1706    Specimen: Blood Updated: 06/16/21 1738     Phosphorus 2.5 mg/dL     Ethanol [[id number]] Collected: 06/16/21 1706    Specimen: Blood Updated: 06/16/21 1736     Ethanol % <0.010 %     Narrative:      Not for legal purposes. Chain of Custody not followed.     Magnesium [[id number]]  (Normal) Collected: 06/16/21 1706    Specimen: Blood Updated: 06/16/21 1735     Magnesium 1.9 mg/dL     Blood Gas, Arterial - [[id number]]  (Abnormal) Collected: 06/16/21 1723    Specimen: Arterial Blood Updated: 06/16/21 1723     Site Right Radial     Rustam's Test Positive     pH, Arterial 7.506 pH units      Comment: 83 Value above reference range        pCO2, Arterial 35.8 mm Hg      pO2, Arterial 54.0 mm Hg      Comment: 85 Value below critical limit        HCO3, Arterial 28.3 mmol/L      Comment: 83 Value  above reference range        Base Excess, Arterial 5.0 mmol/L      Comment: 83 Value above reference range        O2 Saturation, Arterial 91.5 %      Comment: 84 Value below reference range        Temperature 37.0 C      Barometric Pressure for Blood Gas 750 mmHg      Modality Room Air     Ventilator Mode NA     Notified Who HOLLIE HANNAH     Notified By [id number]     Notified Time 06/16/2021 17:28     Collected by [id number]     Comment: Meter: [id number]     :  [id number]        pCO2, Temperature Corrected 35.8 mm Hg      pH, Temp Corrected 7.506 pH Units      pO2, Temperature Corrected 54.0 mm Hg         Imaging Results (Last 24 Hours)     Procedure Component Value Units Date/Time    XR Femur 2 View Right [[id number]] Collected: 06/16/21 1933     Updated: 06/16/21 1937    Narrative:      EXAMINATION: XR FEMUR 2 VW RIGHT-     6/16/2021 6:40 PM CDT     HISTORY: rt leg pain, fall     Right femur, 4 views.     No femur fracture is seen.     The hip and knee are normal, to the extent visualized.     No soft tissue abnormality is seen.       Impression:      1. No acute bony abnormality.              This report was finalized on 06/16/2021 19:34 by Dr. Dharmesh Zuleta MD.    XR Pelvis 1 or 2 View [[id number]] Collected: 06/16/21 1931     Updated: 06/16/21 1936    Narrative:      EXAMINATION: XR PELVIS 1 OR 2 VW-     6/16/2021 6:40 PM CDT     HISTORY: fall, pain     Pelvis, one view.     Mildly displaced superior and inferior right pubic ramus fractures are  not acute and are seen on a CT exam from 6/1/2021.     . SI joints are symmetric.     No sacral fracture is seen.     No hip fracture or dislocation.     Summary:  1. Old fractures of the right superior and inferior pubic ramus.  2. No acute fracture is seen.  This report was finalized on 06/16/2021 19:33 by Dr. Dharmesh Zuleta MD.    XR Hand 3+ View Right [[id number]] Collected: 06/16/21 1930     Updated: 06/16/21 1934    Narrative:      EXAMINATION: XR HAND  3+ VW RIGHT-     6/16/2021 6:40 PM CDT     HISTORY: Fall injury. Right hand pain.     Right hand, 3 views.     Osteopenia.     Chronic appearing small soft tissue calcifications are seen adjacent to  the third and fourth MCP joints.     Metacarpals and fingers show no sign of acute fracture.     The distal aspect of the second finger and third finger are obscured by  a Sensor and wire on some of the images.     Carpal bones align normally.     Summary:  1. No acute fracture is seen.     This report was finalized on 06/16/2021 19:31 by Dr. Dharmesh Zuleta MD.    XR Wrist 3+ View Right [[id number]] Collected: 06/16/21 1929     Updated: 06/16/21 1932    Narrative:      EXAMINATION: XR WRIST 3+ VW RIGHT-     6/16/2021 6:40 PM CDT     HISTORY: Fall injury. Right wrist pain.     Right wrist, 3 views.     Osteopenia.     Diffuse arterial calcification.     Intact distal radius and ulna.     Carpal bones align normally.     Proximal metacarpals are intact.     Summary:  1. No acute fracture.     This report was finalized on 06/16/2021 19:29 by Dr. Dharmesh Zuleta MD.    XR Chest 1 View [[id number]] Collected: 06/16/21 1927     Updated: 06/16/21 1932    Narrative:      EXAMINATION: XR CHEST 1 VW-     6/16/2021 6:40 PM CDT     HISTORY: fever, AMS     1 view chest x-ray compared with 6/7/2021.     Stable heart and mediastinum.  Aortic arch calcification.     Interval removal of a right jugular central line.     There is increased interstitial prominence compatible with interstitial  edema and early heart failure.     There is an unchanged appearance of a moderate right pleural effusion.     Nonhealed fracture of the right humeral neck.  This finding was demonstrated on a radiograph from 4/1/2021.     Summary:  1. Increased interstitial prominence compatible with interstitial edema  and early failure.  2. Unchanged or minimally decreased moderate right pleural effusion.  This report was finalized on 06/16/2021 19:28 by Dr. Barroso  MD Song.    CT Cervical Spine Without Contrast [[id number]] Collected: 06/16/21 1827     Updated: 06/16/21 1832    Narrative:      EXAMINATION: CT CERVICAL SPINE WO CONTRAST-      6/16/2021 6:13 PM CDT     HISTORY: Fall injury. Neck pain. Altered mental status.     In order to have a CT radiation dose as low as reasonably achievable  Automated Exposure Control was utilized for adjustment of the mA and/or  KV according to patient size.     DLP in mGycm= 593.     Noncontrast cervical spine CT.     Comparison is made with cervical spine CT imaging from 3/26/2021.     The patient was unable to cooperate and the images are affected by  motion.     Mild right convex curvature on the coronal sequence is negative and  probably is positional.     The lateral view shows appropriate lordotic curvature with no vertebral  body malalignment.     Prevertebral soft tissues are normal.     Facet joints align normally.     Summary:  1. No acute fracture is seen.                                   This report was finalized on 06/16/2021 18:29 by Dr. Dharmesh Zuleta MD.    CT Head Without Contrast [[id number]] Collected: 06/16/21 1826     Updated: 06/16/21 1830    Narrative:      EXAMINATION: CT HEAD WO CONTRAST-      6/16/2021 6:13 PM CDT     HISTORY: Altered mental status.     In order to have a CT radiation dose as low as reasonably achievable  Automated Exposure Control was utilized for adjustment of the mA and/or  KV according to patient size.     DLP in mGycm= 594.     Noncontrast head CT.     Comparison is made with a noncontrast head CT from 6/1/2021.     Axial, sagittal, and coronal noncontrast CT imaging of the head.     The visualized paranasal sinuses are clear.     The brain and ventricles have an age appropriate appearance.   There is no hemorrhage or mass-effect.   No acute infarction is seen.     No calvarial abnormality.       Impression:      1. No acute intracranial abnormality is seen.  2. Stable chronic change.                                          This report was finalized on 06/16/2021 18:27 by Dr. Dharmesh Zuleta MD.        I have personally reviewed and interpreted the radiology studies and ECG obtained at time of admission.     Assessment / Plan     Assessment:   Active Hospital Problems    Diagnosis    • **Altered mental status    • Diarrhea    • Hypokalemia    • End stage renal failure on dialysis (CMS/[facility])    • Diabetes (CMS/[facility])    • HTN (hypertension)    1.  Altered mental status uncertain if this is related to sepsis we will give adjacent a small bolus of LR she had cultures of blood and urine obtained.  We will continue vancomycin and cefepime for now also obtain stool for C. difficile and stool PCR samples.  Serial neuro exams hopefully she will improve with antibiotics and gentle hydration.  2.  Hypotension history of hypertension currently though hypotensive hold blood pressure medications.  Gentle hydration.  3.  Diarrhea check stool panel, and C. Difficile  4.  End-stage renal disease on dialysis consult nephrology.  5.  Type 2 diabetes Accu-Chek sliding scale insulin as needed.  6.  Anemia chronic stable Procrit or equivalent will be continued.  7.  Home medications reviewed and resumed as appropriate.    Patient seen prior to midnight       Code Status/Advanced Care Plan: Full    The patient's surrogate decision maker is unable to determine at this time.     I discussed my findings and recommendations with the ER attending.    Estimated length of stay is greater than 2 nights.     The patient was seen and examined by me on June 16, 2021 at 11:50 PM.    Electronically signed by Tejinder Kent MD, 06/17/21, 02:21 CDT.                Electronically signed by Tejinder Kent MD at 06/17/21 0232          Emergency Department Notes      Lia Bajwa APRN at 06/16/21 1721     Attestation signed by Eric Avitia MD at 06/17/21 0131          For this patient encounter, I reviewed the NP or  "PA documentation, treatment plan, and medical decision making. Eric Avitia MD 6/17/2021 01:31 CDT                  Subjective   Patient is a 61-year-old female who presents to the ER today per EMS from [facility] with complaint of altered mental status.  Per nursing staff they got very little report from the nursing home.  EMS also got very little report from the nursing home staff.  The patient was apparently found lying on the floor sometime yesterday in her room.  She was then found laying face down in her bed today although she did have a sitter present at that time.  The patient was altered.  Unfortunately no one knows when her last known well time was.  The patient will answer \"yes \"when I say her name.  She does not answer other questions.  She is moaning in the bed and keeps saying \"oh my God, oh my God \".  The patient is unable to tell me where she is hurting it.  Nursing staff states that the patient seemed to note that her right arm was hurting.  She does have some bruising on her right hand and right wrist.  The patient also has some pain to her right hip and femur when I examined the area.  The patient has a history of end-stage renal disease and is on dialysis Tuesday, Thursdays, and Saturdays. We do not know if she had dialysis yesterday or not. The pt was recently admitted for AMS and hyponatremia from June 1 through June 8, 2021.  She was discharged home to [facility].  She presents here today for further evaluation.      History provided by:  Patient, EMS personnel and nursing home   used: No    Altered Mental Status  Presenting symptoms: behavior changes and confusion    Severity:  Moderate  Most recent episode: uknown.  Episode history:  Continuous  Timing:  Constant  Progression:  Unchanged  Context: nursing home resident and recent illness    Context: not alcohol use, not dementia, not drug use, not head injury, not homeless, taking " medications as prescribed, not recent change in medication and not recent infection    Associated symptoms: no abdominal pain, normal movement, no agitation, no bladder incontinence, no decreased appetite, no depression, no difficulty breathing, no eye deviation, no fever, no hallucinations, no headaches, no light-headedness, no nausea, no palpitations, no rash, no seizures, no slurred speech, no suicidal behavior, no visual change, no vomiting and no weakness        Review of Systems   Unable to perform ROS: Mental status change   Constitutional: Negative for decreased appetite and fever.   Cardiovascular: Negative for palpitations.   Gastrointestinal: Negative for abdominal pain, nausea and vomiting.   Genitourinary: Negative for bladder incontinence.   Skin: Negative for rash.   Neurological: Negative for seizures, weakness, light-headedness and headaches.   Psychiatric/Behavioral: Positive for confusion. Negative for agitation and hallucinations.       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    • Chronic otitis media    • Diabetes (CMS/[facility])    • End stage renal disease on dialysis (CMS/[facility])    • Eustachian tube dysfunction    • GERD (gastroesophageal reflux disease)    • Glaucoma    • HTN (hypertension)    • Hyperlipidemia    • Mucoid otitis media    • Noncompliance of patient with renal dialysis (CMS/[facility])    • Tobacco abuse        Allergies   Allergen Reactions   • Bupropion Nausea Only   • Chantix [Varenicline] Other (See Comments)     Does not remember   • Gabapentin Hallucinations     hallucinations   • Azithromycin Rash   • Levofloxacin Rash   • Penicillins Rash   • Sulfa Antibiotics Rash       Past Surgical History:   Procedure Laterality Date   • ARTERIOVENOUS FISTULA     • CATARACT EXTRACTION WITH INTRAOCULAR LENS IMPLANT     •  SECTION     • CHOLECYSTECTOMY     • MYRINGOTOMY W/ TUBES Bilateral    • MYRINGOTOMY W/ TUBES Right    • TUBAL ABDOMINAL  LIGATION         Family History   Problem Relation Age of Onset   • Heart disease Mother    • Diabetes Father    • Colon cancer Neg Hx    • Colon polyps Neg Hx        Social History     Socioeconomic History   • Marital status: Single     Spouse name: Not on file   • Number of children: Not on file   • Years of education: Not on file   • Highest education level: Not on file   Tobacco Use   • Smoking status: Current Every Day Smoker     Packs/day: 2.00     Years: 6.00     Pack years: 12.00     Types: Cigarettes   • Smokeless tobacco: Never Used   Vaping Use   • Vaping Use: Never used   Substance and Sexual Activity   • Alcohol use: No   • Drug use: No   • Sexual activity: Defer           Objective   Physical Exam  Vitals and nursing note reviewed.   Constitutional:       Appearance: She is ill-appearing.   HENT:      Head: Normocephalic.      Mouth/Throat:      Pharynx: Oropharynx is clear.   Eyes:      Conjunctiva/sclera: Conjunctivae normal.      Pupils: Pupils are equal, round, and reactive to light.   Neck:      Comments: Able to move all ext, neurovascularly intact  Cardiovascular:      Rate and Rhythm: Normal rate.   Pulmonary:      Effort: Pulmonary effort is normal.      Breath sounds: Normal breath sounds.   Abdominal:      General: Bowel sounds are normal.      Palpations: Abdomen is soft.   Musculoskeletal:      Cervical back: Normal range of motion and neck supple.      Comments: Ecchymosis noted to dorsal aspect right hand, and radial aspect right wrist.  Patient does wince in pain when I touch this area.  Radial pulse 2+, positive CMS, neurovascularly intact.    Pain to right lateral hip and right femur with palpation.  There is no erythema, ecchymosis or swelling noted.  Pedal pulses 2+, positive CMS, neurovascularly intact.    Fistula noted to left upper extremity.  Positive bruit noted.  Positive thrill.   Skin:     General: Skin is warm and dry.      Capillary Refill: Capillary refill takes less  than 2 seconds.   Neurological:      General: No focal deficit present.   Psychiatric:         Mood and Affect: Mood normal.         Procedures          ED Course  ED Course as of Jun 16 2007 Wed Jun 16, 2021   1735 Pt PO2 54; placed on O2 via NC per RRT.     [LF]   1753 Pt only makes small amt of urine; in/out cath was ordered and nursing staff was only able to obtain very small amt of urine. Lab could only do a urine culture, drug screen or UA on this; order for urine culture at this time.     [LF]   1754 Pt case discussed with Dr. Avitia who agrees with plan of care.     [LF]   1840 CT head/C-spine shows no acute findings.     [LF]   1935 XR Hand 3+ View Right [LF]   1935 XR Wrist 3+ View Right [LF]   1935 XR Chest 1 View [LF]   2002 Pt case discussed with Dr. Kent; pt will be admitted to the ICU at this time in guarded condition.  Patient was hypoxic.  She is placed on O2 at 2 L via nasal cannula and her O2 sat is now 96%.  She continues to be altered.  Her troponin is elevated at 0.132 although it is always chronically elevated in the past.  She is anemic, however has been anemic in the past.     [LF]   2003 Procalcitonin is elevated.  I am concerned about sepsis.  She was given vancomycin as well as cefepime for sepsis.  I did use renal dosing.    [LF]   2006 Pt BP currently 112/52.     [LF]      ED Course User Index  [LF] Lia Bajwa, APRN                                   XR Wrist 3+ View Right   Final Result      XR Pelvis 1 or 2 View   Final Result      XR Hand 3+ View Right   Final Result      XR Femur 2 View Right   Final Result   1. No acute bony abnormality.                   This report was finalized on 06/16/2021 19:34 by Dr. Dharmesh Zuleta MD.      XR Chest 1 View   Final Result      CT Head Without Contrast   Final Result   1. No acute intracranial abnormality is seen.   2. Stable chronic change.                                                       This report was finalized on  06/16/2021 18:27 by Dr. Dharmesh Zuleta MD.      CT Cervical Spine Without Contrast   Final Result        Labs Reviewed   COMPREHENSIVE METABOLIC PANEL - Abnormal; Notable for the following components:       Result Value    Glucose 113 (*)     BUN 25 (*)     Creatinine 4.06 (*)     Potassium 3.1 (*)     Chloride 95 (*)     Total Protein 5.9 (*)     AST (SGOT) 36 (*)     Alkaline Phosphatase 199 (*)     eGFR Non African Amer 11 (*)     BUN/Creatinine Ratio 6.2 (*)     All other components within normal limits    Narrative:     GFR Normal >60  Chronic Kidney Disease <60  Kidney Failure <15     PROTIME-INR - Abnormal; Notable for the following components:    Protime 18.9 (*)     INR 1.72 (*)     All other components within normal limits   APTT - Abnormal; Notable for the following components:    PTT 44.0 (*)     All other components within normal limits   TROPONIN (IN-HOUSE) - Abnormal; Notable for the following components:    Troponin T 0.136 (*)     All other components within normal limits    Narrative:     Troponin T Reference Range:  <= 0.03 ng/mL-   Negative for AMI  >0.03 ng/mL-     Abnormal for myocardial necrosis.  Clinicians would have to utilize clinical acumen, EKG, Troponin and serial changes to determine if it is an Acute Myocardial Infarction or myocardial injury due to an underlying chronic condition.       Results may be falsely decreased if patient taking Biotin.     LACTIC ACID, PLASMA - Abnormal; Notable for the following components:    Lactate 2.8 (*)     All other components within normal limits   PROCALCITONIN - Abnormal; Notable for the following components:    Procalcitonin 12.46 (*)     All other components within normal limits    Narrative:     As a Marker for Sepsis (Non-Neonates):     1. <0.5 ng/mL represents a low risk of severe sepsis and/or septic shock.  2. >2 ng/mL represents a high risk of severe sepsis and/or septic shock.    As a Marker for Lower Respiratory Tract Infections that  "require antibiotic therapy:  PCT on Admission     Antibiotic Therapy             6-12 Hrs later  >0.5                          Strongly Recommended            >0.25 - <0.5             Recommended  0.1 - 0.25                  Discouraged                       Remeasure/reassess PCT  <0.1                         Strongly Discouraged         Remeasure/reassess PCT      As 28 day mortality risk marker: \"Change in Procalcitonin Result\" (>80% or <=80%) if Day 0 (or Day 1) and Day 4 values are available. Refer to [email or web address]pct-calculator.[email or web address]/    Change in PCT <=80 %   A decrease of PCT levels below or equal to 80% defines a positive change in PCT test result representing a higher risk for 28-day all-cause mortality of patients diagnosed with severe sepsis or septic shock.    Change in PCT >80 %   A decrease of PCT levels of more than 80% defines a negative change in PCT result representing a lower risk for 28-day all-cause mortality of patients diagnosed with severe sepsis or septic shock.               CBC WITH AUTO DIFFERENTIAL - Abnormal; Notable for the following components:    RBC 2.71 (*)     Hemoglobin 8.5 (*)     Hematocrit 27.3 (*)     .7 (*)     MCHC 31.1 (*)     RDW 17.7 (*)     RDW-SD 64.7 (*)     nRBC 1.1 (*)     All other components within normal limits   BLOOD GAS, ARTERIAL - Abnormal; Notable for the following components:    pH, Arterial 7.506 (*)     pO2, Arterial 54.0 (*)     HCO3, Arterial 28.3 (*)     Base Excess, Arterial 5.0 (*)     O2 Saturation, Arterial 91.5 (*)     pH, Temp Corrected 7.506 (*)     pO2, Temperature Corrected 54.0 (*)     All other components within normal limits   MANUAL DIFFERENTIAL - Abnormal; Notable for the following components:    Neutrophil % 87.0 (*)     Lymphocyte % 4.0 (*)     Monocyte % 3.0 (*)     Bands %  6.0 (*)     Lymphocytes Absolute 0.27 (*)     nRBC 2.0 (*)     All other components within normal limits   COVID-19,[facility],NASAL SWAB NO " TRANSPORT MEDIA 2 HR TAT - Normal    Narrative:     Fact sheet for providers: https://www.fda.gov/media/605437/download     Fact sheet for patients: https://www.fda.gov/media/661146/download    Test performed by PCR.    Consider negative results in combination with clinical observations, patient history, and epidemiological information.   CK - Normal   PHOSPHORUS - Normal   MAGNESIUM - Normal   ACETAMINOPHEN LEVEL - Normal   SALICYLATE LEVEL - Normal   BLOOD CULTURE   BLOOD CULTURE   URINE CULTURE   RAINBOW DRAW    Narrative:     The following orders were created for panel order [place] Draw.  Procedure                               Abnormality         Status                     ---------                               -----------         ------                     Green Top (Gel)[[id number]]                                  Final result               Lavender Top[[id number]]                                     Final result               Red Top[[id number]]                                          Final result                 Please view results for these tests on the individual orders.   ETHANOL    Narrative:     Not for legal purposes. Chain of Custody not followed.    BLOOD GAS, ARTERIAL   LACTIC ACID, REFLEX   GREEN TOP   LAVENDER TOP   RED TOP   CBC AND DIFFERENTIAL    Narrative:     The following orders were created for panel order CBC & Differential.  Procedure                               Abnormality         Status                     ---------                               -----------         ------                     CBC Auto Differential[[id number]]        Abnormal            Final result                 Please view results for these tests on the individual orders.             MDM  Number of Diagnoses or Management Options  Altered mental status, unspecified altered mental status type: new and requires workup  Anemia, unspecified type: new and requires workup  Elevated troponin: new and requires workup  End  stage renal disease (CMS/HCC): new and requires workup  Fall, initial encounter: new and requires workup  Hypotension, unspecified hypotension type: new and requires workup  Hypoxia: new and requires workup     Amount and/or Complexity of Data Reviewed  Clinical lab tests: reviewed and ordered  Tests in the radiology section of CPT®: ordered and reviewed  Tests in the medicine section of CPT®: ordered and reviewed  Decide to obtain previous medical records or to obtain history from someone other than the patient: yes  Discuss the patient with other providers: yes    Risk of Complications, Morbidity, and/or Mortality  General comments: Total Critical Care Time: 45 mins    Critical Care  Total time providing critical care: 30-74 minutes    Patient Progress  Patient progress: (guarded)      Final diagnoses:   Altered mental status, unspecified altered mental status type   Hypoxia   Elevated troponin   End stage renal disease (CMS/HCC)   Anemia, unspecified type   Hypotension, unspecified hypotension type   Fall, initial encounter       ED Disposition  ED Disposition     ED Disposition Condition Comment    Decision to Admit  Level of Care: Critical Care [6]   Diagnosis: Altered mental status, unspecified altered mental status type [[id number]]   Admitting Physician: SUE CARTWRIGHT [[id number]]   Attending Physician: SUE CARTWRIGHT [[id number]]   Isolate for COVID?: No [0]   Certification: I Certify That Inpatient Hospital Services Are Medically Necessary For Greater Than 2 Midnights            No follow-up provider specified.       Medication List      No changes were made to your prescriptions during this visit.          Lia Bajwa, APRN  06/16/21 2007      Electronically signed by Eric Avitia MD at 06/17/21 0131         Facility-Administered Medications as of 6/17/2021   Medication Dose Route Frequency Provider Last Rate Last Admin   • acetaminophen (TYLENOL) tablet 650 mg  650 mg Oral Q4H PRN Donte  Tejinder WILHELM MD        Or   • acetaminophen (TYLENOL) suppository 650 mg  650 mg Rectal Q4H PRN Tejinder Kent MD       • aspirin EC tablet 81 mg  81 mg Oral Daily Tejinder Kent MD       • atropine 1 % ophthalmic solution 1 drop  1 drop Left Eye Daily Tejinder Kent MD   1 drop at 06/17/21 1120   • brimonidine (ALPHAGAN) 0.2 % ophthalmic solution 1 drop  1 drop Left Eye BID PRN Tejinder Kent MD       • [COMPLETED] cefepime (MAXIPIME) 1 g/100 mL 0.9% NS IVPB (mbp)  1 g Intravenous Once Lia Bajwa APRN   Stopped at 06/16/21 1926   • cefTRIAXone (ROCEPHIN) 2 g/100 mL 0.9% NS IVPB (MBP)  2 g Intravenous Q24H Bin Guy, DO       • cholecalciferol (VITAMIN D3) tablet 2,000 Units  2,000 Units Oral Daily Tejinder Kent MD       • dextrose (D50W) 25 g/ 50mL Intravenous Solution 25 g  25 g Intravenous Q15 Min PRN Tejinder Kent MD       • dextrose (GLUTOSE) oral gel 15 g  15 g Oral Q15 Min PRN Tejinder Kent MD       • epoetin vika-epbx (RETACRIT) injection 10,000 Units  10,000 Units Subcutaneous Once per day on Tue Thu Sat Tejinder Kent MD   10,000 Units at 06/17/21 1120   • famotidine (PEPCID) tablet 20 mg  20 mg Oral Daily Tejinder Kent MD       • Ferric Citrate tablet 840 mg  4 tablet Oral TID With Meals Tejinder Kent MD       • fluticasone (FLONASE) 50 MCG/ACT nasal spray 2 spray  2 spray Nasal BID Tejinder Kent MD   2 spray at 06/17/21 1120   • glucagon (human recombinant) (GLUCAGEN DIAGNOSTIC) injection 1 mg  1 mg Subcutaneous Q15 Min PRN Tejinder Kent MD       • insulin lispro (humaLOG) injection 2-7 Units  2-7 Units Subcutaneous TID AC Tejinder Kent MD       • ipratropium-albuterol (DUO-NEB) nebulizer solution 3 mL  3 mL Nebulization Q4H PRN Tejinder Kent MD       • [COMPLETED] lactated ringers bolus 500 mL  500 mL Intravenous Once Tejinder Kent MD   Stopped at 06/17/21 0308   • lactated ringers infusion  50 mL/hr Intravenous  Continuous Tejinder Kent MD 50 mL/hr at 06/17/21 0308 50 mL/hr at 06/17/21 0308   • lactobacillus acidophilus (RISAQUAD) capsule 1 capsule  1 capsule Oral Daily Tejinder Kent MD       • ondansetron (ZOFRAN) tablet 4 mg  4 mg Oral Q6H PRN Tejinder Kent MD        Or   • ondansetron (ZOFRAN) injection 4 mg  4 mg Intravenous Q6H PRN Tejinder Kent MD       • [COMPLETED] potassium chloride 20 mEq in 100 mL IVPB  20 mEq Intravenous Q2H Tejinder Kent MD 50 mL/hr at 06/17/21 0633 20 mEq at 06/17/21 0633   • pravastatin (PRAVACHOL) tablet 10 mg  10 mg Oral Nightly Tejinder Kent MD       • sertraline (ZOLOFT) tablet 25 mg  25 mg Oral Daily Tejinder Kent MD       • sodium chloride 0.9 % flush 10 mL  10 mL Intravenous PRN Tejinder Kent MD       • sodium chloride 0.9 % flush 10 mL  10 mL Intravenous Q12H Tejinder Kent MD   10 mL at 06/17/21 1119   • sodium chloride 0.9 % flush 10 mL  10 mL Intravenous PRN Tejinder Kent MD       • timolol (TIMOPTIC) 0.5 % ophthalmic solution 1 drop  1 drop Left Eye Daily Tejinder Kent MD   1 drop at 06/17/21 1120   • [COMPLETED] vancomycin (VANCOCIN) in iso-osmotic dextrose IVPB 1 g (premix) 200 mL  1,000 mg Intravenous Once Lia Bajwa APRN   Stopped at 06/16/21 2043   • vancomycin oral solution reconstituted 125 mg  125 mg Oral Q6H Bin Guy DO           Orders (last 48 hrs)      Start     Ordered    06/18/21 2349  Auto Discontinue in 48 Hours if not Collected  ONCE CDIFF      06/16/21 2349    06/18/21 2349  Auto Discontinue GI Panel in 48 Hours if not Collected  ONCE GI PANEL      06/16/21 2349    06/18/21 2000  vancomycin 750 mg/250 mL 0.9% NS IVPB ([facility])  Every 48 Hours,   Status:  Discontinued      06/17/21 0454    06/18/21 0600  CBC & Differential  Morning Draw      06/17/21 0944    06/18/21 0600  Basic Metabolic Panel  Morning Draw      06/17/21 0944    06/18/21 0600  Magnesium  Morning Draw      06/17/21 0944     06/17/21 2100  pravastatin (PRAVACHOL) tablet 10 mg  Nightly      06/17/21 0355    06/17/21 1800  cefepime (MAXIPIME) 2 g/100 mL 0.9% NS (mbp)  Every 24 Hours,   Status:  Discontinued      06/17/21 0454    06/17/21 1315  cefTRIAXone (ROCEPHIN) 2 g/100 mL 0.9% NS IVPB (MBP)  Every 24 Hours      06/17/21 1228    06/17/21 1253  Hemodialysis Inpatient  Once     Comments: Maintain SBP greater than 90    06/17/21 1257    06/17/21 1200  vancomycin oral solution reconstituted 125 mg  Every 6 Hours Scheduled      06/17/21 0838    06/17/21 1130  POC Glucose Once  Once      06/17/21 1118    06/17/21 0900  aspirin EC tablet 81 mg  Daily      06/17/21 0355    06/17/21 0900  atropine 1 % ophthalmic solution 1 drop  Daily      06/17/21 0355    06/17/21 0900  cholecalciferol (VITAMIN D3) tablet 2,000 Units  Daily      06/17/21 0355    06/17/21 0900  epoetin vika-epbx (RETACRIT) injection 10,000 Units  Once per day on Tue Thu Sat      06/17/21 0355    06/17/21 0900  famotidine (PEPCID) tablet 20 mg  Daily      06/17/21 0355    06/17/21 0900  fluticasone (FLONASE) 50 MCG/ACT nasal spray 2 spray  2 Times Daily      06/17/21 0355    06/17/21 0900  lactobacillus acidophilus (RISAQUAD) capsule 1 capsule  Daily      06/17/21 0355    06/17/21 0900  sertraline (ZOLOFT) tablet 25 mg  Daily      06/17/21 0355    06/17/21 0900  timolol (TIMOPTIC) 0.5 % ophthalmic solution 1 drop  Daily      06/17/21 0355    06/17/21 0900  sodium chloride 0.9 % flush 10 mL  Every 12 Hours Scheduled      06/17/21 0355    06/17/21 0839  SLP Consult: Eval & Treat RN Dysphagia Screen Failed  Once      06/17/21 0838    06/17/21 0837  Patient Isolation Contact Spore  Continuous     Comments: Isolation Precautions Per Clostridium Difficile (CDI) Infection Control Policy / Process    06/17/21 0838    06/17/21 0831  Troponin  Once      06/17/21 0832    06/17/21 0831  Lactic Acid, Plasma  Once      06/17/21 0832    06/17/21 0800  Ferric Citrate tablet 840 mg  3 Times  Daily With Meals      06/17/21 0355    06/17/21 0730  insulin lispro (humaLOG) injection 2-7 Units  3 Times Daily Before Meals      06/17/21 0355    06/17/21 0702  Inpatient Nephrology Consult  IN AM     Specialty:  Nephrology  Provider:  (Not yet assigned)    06/17/21 0355    06/17/21 0700  POC Glucose 4x Daily AC & at Bedtime  4 Times Daily Before Meals & at Bedtime     Comments: If bedtime blood glucose is greater than 350 mg/dl, call MD.      06/17/21 0355    06/17/21 0654  POC Glucose Once  Once      06/17/21 0641    06/17/21 0630  Manual Differential  Once      06/17/21 0629    06/17/21 0600  Comprehensive Metabolic Panel  Morning Draw      06/17/21 0355    06/17/21 0600  Magnesium  Morning Draw      06/17/21 0355    06/17/21 0600  Phosphorus  Morning Draw      06/17/21 0355    06/17/21 0600  CBC Auto Differential  Morning Draw      06/17/21 0355    06/17/21 0415  potassium chloride 20 mEq in 100 mL IVPB  Every 2 Hours      06/17/21 0355    06/17/21 0400  Vital Signs Every Hour and Per Hospital Policy Based on Patient Condition  Every Hour      06/17/21 0355    06/17/21 0400  Intake and Output  Every Hour      06/17/21 0355    06/17/21 0400  Neuro Checks  Every 4 Hours      06/17/21 0355    06/17/21 0356  Do NOT Hold Basal or Correction Scale Insulin When Patient is NPO, Hold Scheduled Mealtime (Bolus) Insulin if NPO  Continuous      06/17/21 0355    06/17/21 0356  Follow [facility] Hypoglycemia Standing Orders For Blood Glucose Less Than 70 mg/dL  Until Discontinued     Comments: ALERT PATIENT - NOT NPO & CAN SAFELY SWALLOW  Administer 4 oz Fruit Juice OR 4 oz Regular Soda OR 8 oz Milk OR 15-30 grams (1 tube) of Glucose Gel.  Recheck Blood Glucose Approximately 15 Minutes After Ingestion, Repeat Treatment & Continue to Recheck Blood Sugar Approximately Every 15 Minutes Until Blood Glucose is 70 or Higher.  Once Blood Glucose is 70 or Higher & if It Will Be More Than 60 Minutes Until Next Meal, Provide Appropriate  Snack (Including Carbohydrate Food) Based on Meal Plan Order. Give Meal Tray As Soon As Possible.    PATIENT HAS IV ACCESS - UNRESPONSIVE, NPO OR UNABLE TO SAFELY SWALLOW  Administer 25g (50ml) D50W IV Push.  Recheck Blood Glucose Approximately 15 Minutes After Administration, if Blood Glucose Remains Less Than 70, Repeat Treatment   Recheck Blood Glucose Approximately 15 Minutes After 2nd Administration, if Blood Glucose Remains Less Than 70 After 2nd Dose of D50W, Contact Provider for Further Treatment Orders & Consider Adding IVF With D5W for Maintenance    PATIENT WITHOUT IV ACCESS - UNRESPONSIVE, NPO OR UNABLE TO SAFELY SWALLOW  Administer 1mg Glucagon SQ & Establish IV Access.  Turn Patient on Side - Nausea / Vomiting May Occur.  Recheck Blood Glucose Approximately 15 Minutes After Administration.  If Blood Glucose Remains Less Than 70, Administer 25g D50W IV Push (50ml).  Recheck Blood Glucose Approximately 15 Minutes After Administration of D50W, if Blood Glucose Remains Less Than 70, Contact Provider for Further Treatment Orders & Consider Adding IVF With D5 for Maintenance    Document Event & Patient Response to Interventions in EMR, Document Medications on MAR  Notify Provider if Hypoglycemia Treatment Needed    06/17/21 0355    06/17/21 0356  Cardiac Monitoring  Continuous      06/17/21 0355    06/17/21 0356  Continuous Pulse Oximetry  Continuous      06/17/21 0355    06/17/21 0356  Height & Weight  Once      06/17/21 0355    06/17/21 0356  Daily Weights  Daily      06/17/21 0355    06/17/21 0356  Use Mobility Guidelines for Advancement of Activity  Continuous      06/17/21 0355    06/17/21 0356  Insert Peripheral IV  Once      06/17/21 0355    06/17/21 0356  Saline Lock & Maintain IV Access  Continuous      06/17/21 0355    06/17/21 0356  Place Sequential Compression Device  Once      06/17/21 0355    06/17/21 0356  Maintain Sequential Compression Device  Continuous      06/17/21 0355    06/17/21 0356   NPO Diet  Diet Effective Now      06/17/21 0355 06/17/21 0356  TSH  STAT      06/17/21 0355 06/17/21 0356  T4, Free  STAT      06/17/21 0355 06/17/21 0356  Urine Drug Screen - Urine, Clean Catch  Once      06/17/21 0355 06/17/21 0355  sodium chloride 0.9 % flush 10 mL  As Needed      06/17/21 0355 06/17/21 0355  acetaminophen (TYLENOL) tablet 650 mg  Every 4 Hours PRN      06/17/21 0355    06/17/21 0355  acetaminophen (TYLENOL) suppository 650 mg  Every 4 Hours PRN      06/17/21 0355 06/17/21 0355  ondansetron (ZOFRAN) tablet 4 mg  Every 6 Hours PRN      06/17/21 0355 06/17/21 0355  ondansetron (ZOFRAN) injection 4 mg  Every 6 Hours PRN      06/17/21 0355 06/17/21 0355  albuterol sulfate HFA (PROVENTIL HFA;VENTOLIN HFA;PROAIR HFA) inhaler 2 puff  Every 6 Hours PRN,   Status:  Discontinued      06/17/21 0355 06/17/21 0355  brimonidine (ALPHAGAN) 0.2 % ophthalmic solution 1 drop  2 Times Daily PRN      06/17/21 0355 06/17/21 0355  ipratropium-albuterol (DUO-NEB) nebulizer solution 3 mL  Every 4 Hours PRN      06/17/21 0355 06/17/21 0355  dextrose (GLUTOSE) oral gel 15 g  Every 15 Minutes PRN      06/17/21 0355    06/17/21 0355  dextrose (D50W) 25 g/ 50mL Intravenous Solution 25 g  Every 15 Minutes PRN      06/17/21 0355    06/17/21 0355  glucagon (human recombinant) (GLUCAGEN DIAGNOSTIC) injection 1 mg  Every 15 Minutes PRN      06/17/21 0355 06/17/21 0355  Pharmacy to dose vancomycin  Continuous PRN,   Status:  Discontinued      06/17/21 0355 06/17/21 0355  Pharmacy Consult - Pharmacy to dose  Continuous PRN,   Status:  Discontinued      06/17/21 0355 06/17/21 0234  lactated ringers infusion  Continuous      06/17/21 0232 06/17/21 0216  Code Status and Medical Interventions:  Continuous      06/17/21 0220    06/16/21 2351  lactated ringers bolus 500 mL  Once      06/16/21 2349    06/16/21 2349  Clostridium Difficile Toxin - Stool, Per Rectum  Once      06/16/21 2349     06/16/21 2349  Patient Isolation Contact Spore  Continuous,   Status:  Canceled     Comments: Isolation Precautions Per Clostridium Difficile (CDI) Infection Control Policy / Process    06/16/21 2349    06/16/21 2349  Gastrointestinal Panel, PCR - Stool, Per Rectum  Once      06/16/21 2349    06/16/21 2349  Clostridium Difficile Toxin, PCR - Stool, Per Rectum  PROCEDURE ONCE      06/16/21 2349    06/16/21 2049  Timed Lactic Acid, Reflex  PROCEDURE ONCE      06/16/21 1818    06/16/21 2007  Inpatient Admission  Once      06/16/21 2006 06/16/21 1835  Manual Differential  Once      06/16/21 1834    06/16/21 1827  Manual Differential  Once,   Status:  Canceled      06/16/21 1826    06/16/21 1800  cefepime (MAXIPIME) 1 g/100 mL 0.9% NS IVPB (mbp)  Once      06/16/21 1753    06/16/21 1800  vancomycin (VANCOCIN) in iso-osmotic dextrose IVPB 1 g (premix) 200 mL  Once      06/16/21 1753    06/16/21 1755  Urine Culture - Urine, Urine, Catheter  Once      06/16/21 1754    06/16/21 1739  COVID-19,[facility] IN-HOUSE,Nasal Swab No Transport Media 3-4 HR TAT - Swab, Nasal Cavity  Once      06/16/21 1738    06/16/21 1724  Blood Gas, Arterial -  Once      06/16/21 1723    06/16/21 1723  CBC Auto Differential  Once      06/16/21 1722    06/16/21 1716  XR Femur 2 View Right  1 Time Imaging      06/16/21 1716    06/16/21 1716  Acetaminophen Level  Once      06/16/21 1716    06/16/21 1716  Salicylate Level  Once      06/16/21 1716    06/16/21 1716  Cardiac Monitoring  Once,   Status:  Canceled      06/16/21 1716    06/16/21 1716  Pulse Oximetry, Continuous  Continuous,   Status:  Canceled      06/16/21 1716    06/16/21 1716  NPO Diet  Diet Effective Now,   Status:  Canceled      06/16/21 1716    06/16/21 1716  Insert peripheral IV  Once      06/16/21 1716    06/16/21 1716  Blood Gas, Arterial -  Once      06/16/21 1716    06/16/21 1715  sodium chloride 0.9 % flush 10 mL  As Needed      06/16/21 1716    06/16/21 1715  XR Chest 1 View  1  Time Imaging      06/16/21 1716    06/16/21 1715  XR Hand 3+ View Right  1 Time Imaging      06/16/21 1716    06/16/21 1715  XR Wrist 3+ View Right  1 Time Imaging      06/16/21 1716    06/16/21 1715  XR Pelvis 1 or 2 View  1 Time Imaging      06/16/21 1716    06/16/21 1714  Magnesium  STAT      06/16/21 1716    06/16/21 1714  ECG 12 Lead  Once      06/16/21 1716    06/16/21 1714  Apply collar  Once      06/16/21 1716    06/16/21 1714  CT Head Without Contrast  1 Time Imaging      06/16/21 1716    06/16/21 1714  CT Cervical Spine Without Contrast  1 Time Imaging      06/16/21 1716    06/16/21 1713  Blood Culture - Blood, Arm, Right  Once      06/16/21 1716    06/16/21 1713  Blood Culture - Blood, Arm, Right  Once      06/16/21 1716    06/16/21 1713  Lactic Acid, Plasma  Once      06/16/21 1716    06/16/21 1713  Procalcitonin  Once      06/16/21 1716    06/16/21 1713  Ethanol  Once      06/16/21 1716    06/16/21 1713  Urine Drug Screen - Urine, Catheter  Once,   Status:  Canceled      06/16/21 1716    06/16/21 1713  CK  Once      06/16/21 1716    06/16/21 1713  Phosphorus  Once      06/16/21 1716    06/16/21 1712  Protime-INR  Once      06/16/21 1716    06/16/21 1712  aPTT  Once      06/16/21 1716    06/16/21 1712  Urinalysis With Culture If Indicated - Urine, Catheter  Once,   Status:  Canceled      06/16/21 1716    06/16/21 1712  Troponin  Once      06/16/21 1716    06/16/21 1711  CBC & Differential  Once      06/16/21 1716    06/16/21 1711  Comprehensive Metabolic Panel  Once      06/16/21 1716    06/16/21 1704  [place] Draw  STAT      06/16/21 1703    06/16/21 1704  Green Top (Gel)  PROCEDURE ONCE      06/16/21 1703    06/16/21 1704  Lavender Top  PROCEDURE ONCE      06/16/21 1703    06/16/21 1704  Red Top  PROCEDURE ONCE      06/16/21 1703    Pending  Inpatient Case Management  Consult  Once     Provider:  (Not yet assigned)    Pending    --  SCANNED - TELEMETRY        06/16/21 0000    Signed and  Held  sodium chloride 0.9 % bolus 100 mL  As Needed      Signed and Held    Signed and Held  albumin human 25 % IV SOLN 12.5 g  As Needed      Signed and Held                 Physician Progress Notes (last 48 hours) (Notes from 06/15/21 1306 through 06/17/21 1306)      Bin Guy, DO at 06/17/21 0839              [facility] Medicine Services  INPATIENT PROGRESS NOTE    Patient Name: Mini Gentile  Date of Admission: 6/16/2021  Today's Date: 06/17/21  Length of Stay: 1  Primary Care Physician: Bharati Dahl APRN    Subjective   Chief Complaint: AMS/cdiff    HPI   Patient seen in CCU 8.  She admitted overnight from nursing home for her diarrhea and altered mental status.  Hypotensive.  She is arousable this morning but only will stay awake for a few seconds at a time.  She will answer questions.  She denies chest pain.  Denies shortness of breath.  Does substantiate that she been having bouts of diarrhea for at least several days nursing home but denies any overt abdominal pain.  She was recently here and treated for aspiration pneumonia with antibiotics.  Her C. difficile has come back positive.  She is afebrile.  Blood pressure soft still.        Review of Systems   All pertinent negatives and positives are as above. All other systems have been reviewed and are negative unless otherwise stated.     Objective    Temp:  [95.5 °F (35.3 °C)-100.2 °F (37.9 °C)] 95.5 °F (35.3 °C)  Heart Rate:  [67-97] 67  Resp:  [14-22] 20  BP: ()/() 98/56  Physical Exam  GEN: somnolent but easily awoken, interactive but will fall asleep frequently, NAD  HEENT: EOMI, Anicteric, Trachea midline  Lungs: no wheezing/rales/rhonchi  Heart: RRR, +S1/s2, no rub  ABD: soft, possible mild distension but +BS and no guarding/rebound  Extremities:  no cyanosis, no edema  Skin: no rashes or petechiae   Neuro: AAOx1-2, MONSALVE  Psych: difficult to assess at this time        Results Review:  I have  reviewed the labs, radiology results, and diagnostic studies.    Laboratory Data:   Results from last 7 days   Lab Units 06/17/21  0609 06/16/21  1706 06/16/21  1245   WBC 10*3/mm3 8.52 6.65 5.45   HEMOGLOBIN g/dL 9.9* 8.5* 8.9*   HEMATOCRIT % 32.3* 27.3* 28.0*   PLATELETS 10*3/mm3 130* 145 156        Results from last 7 days   Lab Units 06/17/21  0609 06/16/21  1706 06/16/21  1245   SODIUM mmol/L 135* 137 137   POTASSIUM mmol/L 3.8 3.1* 3.1*   CHLORIDE mmol/L 94* 95* 94*   CO2 mmol/L 28.0 28.0 28.0   BUN mg/dL 30* 25* 25*   CREATININE mg/dL 4.26* 4.06* 3.68*   CALCIUM mg/dL 9.2 9.1 9.0   BILIRUBIN mg/dL 0.8 0.6 0.6   ALK PHOS U/L 200* 199* 222*   ALT (SGPT) U/L 18 19 20   AST (SGOT) U/L 29 36* 36*   GLUCOSE mg/dL 97 113* 150*       Culture Data:   No results found for: BLOODCX, URINECX, WOUNDCX, MRSACX, RESPCX, STOOLCX    Radiology Data:   Imaging Results (Last 24 Hours)     Procedure Component Value Units Date/Time    XR Femur 2 View Right [[id number]] Collected: 06/16/21 1933     Updated: 06/16/21 1937    Narrative:      EXAMINATION: XR FEMUR 2 VW RIGHT-     6/16/2021 6:40 PM CDT     HISTORY: rt leg pain, fall     Right femur, 4 views.     No femur fracture is seen.     The hip and knee are normal, to the extent visualized.     No soft tissue abnormality is seen.       Impression:      1. No acute bony abnormality.              This report was finalized on 06/16/2021 19:34 by Dr. Dharmesh Zuleta MD.    XR Pelvis 1 or 2 View [[id number]] Collected: 06/16/21 1931     Updated: 06/16/21 1936    Narrative:      EXAMINATION: XR PELVIS 1 OR 2 VW-     6/16/2021 6:40 PM CDT     HISTORY: fall, pain     Pelvis, one view.     Mildly displaced superior and inferior right pubic ramus fractures are  not acute and are seen on a CT exam from 6/1/2021.     . SI joints are symmetric.     No sacral fracture is seen.     No hip fracture or dislocation.     Summary:  1. Old fractures of the right superior and inferior pubic ramus.  2. No  acute fracture is seen.  This report was finalized on 06/16/2021 19:33 by Dr. Dharmesh Zuleta MD.    XR Hand 3+ View Right [[id number]] Collected: 06/16/21 1930     Updated: 06/16/21 1934    Narrative:      EXAMINATION: XR HAND 3+ VW RIGHT-     6/16/2021 6:40 PM CDT     HISTORY: Fall injury. Right hand pain.     Right hand, 3 views.     Osteopenia.     Chronic appearing small soft tissue calcifications are seen adjacent to  the third and fourth MCP joints.     Metacarpals and fingers show no sign of acute fracture.     The distal aspect of the second finger and third finger are obscured by  a Sensor and wire on some of the images.     Carpal bones align normally.     Summary:  1. No acute fracture is seen.     This report was finalized on 06/16/2021 19:31 by Dr. Dharmesh Zuleta MD.    XR Wrist 3+ View Right [[id number]] Collected: 06/16/21 1929     Updated: 06/16/21 1932    Narrative:      EXAMINATION: XR WRIST 3+ VW RIGHT-     6/16/2021 6:40 PM CDT     HISTORY: Fall injury. Right wrist pain.     Right wrist, 3 views.     Osteopenia.     Diffuse arterial calcification.     Intact distal radius and ulna.     Carpal bones align normally.     Proximal metacarpals are intact.     Summary:  1. No acute fracture.     This report was finalized on 06/16/2021 19:29 by Dr. Dharmesh Zuleta MD.    XR Chest 1 View [[id number]] Collected: 06/16/21 1927     Updated: 06/16/21 1932    Narrative:      EXAMINATION: XR CHEST 1 VW-     6/16/2021 6:40 PM CDT     HISTORY: fever, AMS     1 view chest x-ray compared with 6/7/2021.     Stable heart and mediastinum.  Aortic arch calcification.     Interval removal of a right jugular central line.     There is increased interstitial prominence compatible with interstitial  edema and early heart failure.     There is an unchanged appearance of a moderate right pleural effusion.     Nonhealed fracture of the right humeral neck.  This finding was demonstrated on a radiograph from 4/1/2021.      Summary:  1. Increased interstitial prominence compatible with interstitial edema  and early failure.  2. Unchanged or minimally decreased moderate right pleural effusion.  This report was finalized on 06/16/2021 19:28 by Dr. Dharmesh Zuleta MD.    CT Cervical Spine Without Contrast [[id number]] Collected: 06/16/21 1827     Updated: 06/16/21 1832    Narrative:      EXAMINATION: CT CERVICAL SPINE WO CONTRAST-      6/16/2021 6:13 PM CDT     HISTORY: Fall injury. Neck pain. Altered mental status.     In order to have a CT radiation dose as low as reasonably achievable  Automated Exposure Control was utilized for adjustment of the mA and/or  KV according to patient size.     DLP in mGycm= 593.     Noncontrast cervical spine CT.     Comparison is made with cervical spine CT imaging from 3/26/2021.     The patient was unable to cooperate and the images are affected by  motion.     Mild right convex curvature on the coronal sequence is negative and  probably is positional.     The lateral view shows appropriate lordotic curvature with no vertebral  body malalignment.     Prevertebral soft tissues are normal.     Facet joints align normally.     Summary:  1. No acute fracture is seen.                                   This report was finalized on 06/16/2021 18:29 by Dr. Dharmesh Zuleta MD.    CT Head Without Contrast [[id number]] Collected: 06/16/21 1826     Updated: 06/16/21 1830    Narrative:      EXAMINATION: CT HEAD WO CONTRAST-      6/16/2021 6:13 PM CDT     HISTORY: Altered mental status.     In order to have a CT radiation dose as low as reasonably achievable  Automated Exposure Control was utilized for adjustment of the mA and/or  KV according to patient size.     DLP in mGycm= 594.     Noncontrast head CT.     Comparison is made with a noncontrast head CT from 6/1/2021.     Axial, sagittal, and coronal noncontrast CT imaging of the head.     The visualized paranasal sinuses are clear.     The brain and ventricles have  an age appropriate appearance.   There is no hemorrhage or mass-effect.   No acute infarction is seen.     No calvarial abnormality.       Impression:      1. No acute intracranial abnormality is seen.  2. Stable chronic change.                                         This report was finalized on 06/16/2021 18:27 by Dr. Dharmesh Zuleta MD.          I have reviewed the patient's current medications.     Assessment/Plan     Active Hospital Problems    Diagnosis    • **C. difficile diarrhea    • Diarrhea    • Hypokalemia    • Altered mental status    • End stage renal failure on dialysis (CMS/[facility])    • Diabetes (CMS/[facility])    • HTN (hypertension)        #1 C. difficile diarrhea -in the setting of recent antibiotics for aspiration pneumonia.  Appears to be more mild case.  Blood pressures are somewhat soft but I feel like that was an issue last hospital stay as well.  She does not have a fever or high white count.  Her belly is not overtly distended or tender.  Will DC IV vancomycin and cefepime.  Start p.o. vancomycin 125 every 6.  If patient unable to pass swallow eval will need to drop an NGT and start Vanco that way.    #2 altered mental status -patient also presented with all mental status last time she had an infection.  It took her several days to return to baseline.  Her head CT was nonacute.  She will extremities.  Treat underlying C. difficile and plan for dialysis.  Monitor closely.  No obvious focal deficits.    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  Left to look further back.  At this time regardless hold meds.  Blood pressure stable around 100 systolic.    #4 hypokalemia -resolved.  Likely in setting of diarrhea.    #5 ESRD -nephrology consulted.  Today is a normal dialysis day.  HD per them.    #6 DM2 -sliding scale insulin and hypoglycemia protocol.      Discharge Planning: Ongoing.  Blood pressure still soft.  Plan for  dialysis today.  Monitor in ICU at this time given blood pressures.  Will reevaluate later.  Will likely need to go back to nursing facility at discharge.    Electronically signed by Bin Guy DO, 06/17/21, 08:39 CDT.      Electronically signed by Bin Guy DO at 06/17/21 0944

## 2021-06-17 NOTE — CONSULTS
"Pharmacy Dosing Service  Antimicrobial Dosing  Cefepime & Vancomycin    Assessment/Action/Plan:  Pharmacy to dose Cefepime and Vancomycin requests for dialysis patient for Sepsis. Initiated the following tentative dosing regimens until nephrology determines dialysis to be utilized while hospitalized.    Initiated renally-adjusted/HD patient extended-infusion dosing of Cefepime 2 gm IV over 4 hours every 24 hours, following initial dose given in ER. Current end date/final dose: 6/23/21 at 1800.    Initiated Vancomycin 750 mg IV every 48 hours, following a 1,000 mg dose given in ER. No levels ordered at this time. Current end date/final dose: 6/24/21 at 2000.    Pharmacy will continue to monitor daily and make further adjustment(s) accordingly.     Subjective:  Mini Gentile is a 61 y.o. female with a  \"Pharmacy to Dose Cefepime and Vancomycin\" consult for the treatment of Sepsis , day 2 of treatment.    Objective:  Ht: 147.3 cm (58\"); Wt: 69.6 kg (153 lb 7 oz)  Estimated Creatinine Clearance: 12.6 mL/min (A) (by C-G formula based on SCr of 4.06 mg/dL (H)).   Creatinine   Date Value Ref Range Status   06/16/2021 4.06 (H) 0.57 - 1.00 mg/dL Final   06/16/2021 3.68 (H) 0.57 - 1.00 mg/dL Final      Lab Results   Component Value Date    WBC 6.65 06/16/2021    WBC 5.45 06/16/2021      Baseline culture results:  Microbiology Results (last 10 days)       Procedure Component Value - Date/Time    Clostridium Difficile Toxin - Stool, Per Rectum [[id number]]  (Abnormal) Collected: 06/17/21 0212    Lab Status: Final result Specimen: Stool from Per Rectum Updated: 06/17/21 0322    Narrative:      The following orders were created for panel order Clostridium Difficile Toxin - Stool, Per Rectum.  Procedure                               Abnormality         Status                     ---------                               -----------         ------                     Clostridium Difficile To...[[id number]]  Abnormal            " Final result                 Please view results for these tests on the individual orders.    Gastrointestinal Panel, PCR - Stool, Per Rectum [[id number]]  (Normal) Collected: 06/17/21 0212    Lab Status: Final result Specimen: Stool from Per Rectum Updated: 06/17/21 0342     Campylobacter Not Detected     Plesiomonas shigelloides Not Detected     Salmonella Not Detected     Vibrio Not Detected     Vibrio cholerae Not Detected     Yersinia enterocolitica Not Detected     Enteroaggregative E. coli (EAEC) Not Detected     Enteropathogenic E. coli (EPEC) Not Detected     Enterotoxigenic E. coli (ETEC) lt/st Not Detected     Shiga-like toxin-producing E. coli (STEC) stx1/stx2 Not Detected     Shigella/Enteroinvasive E. coli (EIEC) Not Detected     Cryptosporidium Not Detected     Cyclospora cayetanensis Not Detected     Entamoeba histolytica Not Detected     Giardia lamblia Not Detected     Adenovirus F40/41 Not Detected     Astrovirus Not Detected     Norovirus GI/GII Not Detected     Rotavirus A Not Detected     Sapovirus (I, II, IV or V) Not Detected    Narrative:      If Aeromonas, Staphylococcus aureus or Bacillus cereus are suspected, please order [id number]: Stool Culture, Aeromonas, S aureus, B Cereus.    Clostridium Difficile Toxin, PCR - Stool, Per Rectum [[id number]]  (Abnormal) Collected: 06/17/21 0212    Lab Status: Final result Specimen: Stool from Per Rectum Updated: 06/17/21 0322     C. Difficile Toxins by PCR Positive    Narrative:      Performance characteristics of test not established for patients <2 years of age.    COVID-19,[facility] IN-HOUSE,Nasal Swab No Transport Media 3-4 HR TAT - Swab, Nasal Cavity [[id number]]  (Normal) Collected: 06/16/21 1813    Lab Status: Final result Specimen: Swab from Nasal Cavity Updated: 06/16/21 1912     COVID19 Not Detected    Narrative:      Fact sheet for providers: https://www.fda.gov/media/261159/download     Fact sheet for patients:  https://www.fda.gov/media/605608/download    Test performed by PCR.    Consider negative results in combination with clinical observations, patient history, and epidemiological information.    COVID PRE-OP / PRE-PROCEDURE SCREENING ORDER (NO ISOLATION) - Swab, Nasal Cavity [[id number]]  (Normal) Collected: 06/08/21 1613    Lab Status: Final result Specimen: Swab from Nasal Cavity Updated: 06/08/21 1717    Narrative:      The following orders were created for panel order COVID PRE-OP / PRE-PROCEDURE SCREENING ORDER (NO ISOLATION) - Swab, Nasal Cavity.  Procedure                               Abnormality         Status                     ---------                               -----------         ------                     COVID-19,[facility]...[[id number]]  Normal              Final result                 Please view results for these tests on the individual orders.    COVID-19,[facility] IN-HOUSE,Nasal Swab No Transport Media 3-4 HR TAT - Swab, Nasal Cavity [[id number]]  (Normal) Collected: 06/08/21 1613    Lab Status: Final result Specimen: Swab from Nasal Cavity Updated: 06/08/21 1717     COVID19 Not Detected    Narrative:      Fact sheet for providers: https://www.fda.gov/media/934366/download     Fact sheet for patients: https://www.fda.gov/media/437902/download    Test performed by PCR.    Consider negative results in combination with clinical observations, patient history, and epidemiological information.            Vimal Jewell, LaneyD  06/17/21 04:57 CDT

## 2021-06-17 NOTE — CASE MANAGEMENT/SOCIAL WORK
Discharge Planning Assessment  [facility]     Patient Name: Mini Gentile  MRN: [id number]  Today's Date: 6/17/2021    Admit Date: 6/16/2021    Discharge Needs Assessment     Row Name 06/17/21 0838       Living Environment    Lives With  facility resident    Name(s) of Who Lives With Patient  [facility]    Current Living Arrangements  extended care facility    Quality of Family Relationships  supportive;helpful;involved    Able to Return to Prior Arrangements  yes       Resource/Environmental Concerns    Resource/Environmental Concerns  none       Transition Planning    Patient/Family Anticipates Transition to  long-term care facility    Patient/Family Anticipated Services at Transition  skilled nursing    Transportation Anticipated  family or friend will provide;health plan transportation       Discharge Needs Assessment    Readmission Within the Last 30 Days  current reason for admission unrelated to previous admission    Current Outpatient/Agency/Support Group  skilled nursing facility;outpatient hemodialysis    Concerns to be Addressed  care coordination/care conferences;discharge planning    Outpatient/Agency/Support Group Needs  skilled nursing facility;outpatient hemodialysis    Discharge Facility/Level of Care Needs  nursing facility, skilled    Current Discharge Risk  chronically ill    Discharge Coordination/Progress  Patient is currently residing at [facility] [phone].  Per Elsy, admissions director at [facility], patient does NOT have a bed hold.  Facility can accept back at discharge if female bed available.  Patient's insurance will require prior approval to return to [facility].  Will need to provide SNF advance notice of discharge for insurance precert to be completed.        Discharge Plan    No documentation.       Continued Care and Services - Admitted Since 6/16/2021    Coordination has not been started for this encounter.     Selected Continued Care - Prior Encounters Includes  selections from prior encounters from 3/18/2021 to 6/17/2021    Discharged on 6/8/2021 Admission date: 6/1/2021 - Discharge disposition: Skilled Nursing Facility (DC - External)    Destination     Service Provider Selected Services Address Phone Fax Patient Preferred    [facility]  Skilled Nursing [address], [facility] [address] [phone] --                Discharged on 4/8/2021 Admission date: 4/3/2021 - [place] [facility] [place]     Service Provider Selected Services Address Phone Fax Patient Preferred    [facility] [address], [facility] [id number] [phone] --                Discharged on 3/28/2021 Admission date: 3/26/2021 - Discharge disposition: Home-Health Care [facility]     Service Provider Selected Services Address Phone Fax Patient Preferred    [facility] [address] , [facility] [phone] --                      Demographic Summary    No documentation.       Functional Status    No documentation.       Psychosocial    No documentation.       Abuse/Neglect    No documentation.       Legal    No documentation.       Substance Abuse    No documentation.       Patient Forms    No documentation.           Ivone Santana, GONZALESW

## 2021-06-17 NOTE — CONSULTS
Nephrology ([facility]) Consult Note      Patient:  Mini Gentile  YOB: 1960  Date of Service: 6/17/2021  MRN: [id number]   Acct: [id number]   Primary Care Physician: Bharati Dahl APRN  Advance Directive:   Code Status and Medical Interventions:   Ordered at: 06/17/21 0220     Code Status:    CPR     Medical Interventions (Level of Support Prior to Arrest):    Full     Admit Date: 6/16/2021       Hospital Day: 1  Referring Provider: Tejinder Kent MD      Patient personally seen and examined.  Complete chart including Consults, Notes, Operative Reports, Labs, Cardiology, and Radiology studies reviewed as able.        Subjective:  Mini Gentile is a 61 y.o. female  whom we were consulted for end stage renal disease. Maintenance hemodialysis on Tuesday Thursday Saturday at [facility]. Fairly noncompliant with dialysis treatments. She was just discharged from [facility] on 6/08. Unknown if she has been going to HD treatments since then.  Presented to ER after she was noted to be confused at nursing home.  Patient initially minimally responsive but is somewhat more interactive today. Still lethargic but will wake and answer some questions. Complaints of diarrhea recently, and stool has tested positive for C Diff.    Allergies:  Bupropion, Chantix [varenicline], Gabapentin, Azithromycin, Levofloxacin, Penicillins, and Sulfa antibiotics    Home Meds:  Medications Prior to Admission   Medication Sig Dispense Refill Last Dose   • albuterol sulfate  (90 Base) MCG/ACT inhaler Inhale 2 puffs Every 4 (Four) Hours As Needed for Wheezing. (Patient taking differently: Inhale 2 puffs Every 6 (Six) Hours As Needed for Wheezing.) 18 g 0    • aspirin 81 MG EC tablet Take 81 mg by mouth Daily.      • atropine 1 % ophthalmic solution Administer 1 drop into the left eye Daily.      • brimonidine (ALPHAGAN) 0.2 % ophthalmic solution Administer 1 drop into the left  eye 2 (Two) Times a Day As Needed.      • carvedilol (COREG) 6.25 MG tablet Take 6.25 mg by mouth 2 (Two) Times a Day With Meals.      • cholecalciferol (VITAMIN D3) 1000 units tablet Take 2,000 Units by mouth Daily.      • dorzolamide (TRUSOPT) 2 % ophthalmic solution Administer 1 drop into the left eye 2 (Two) Times a Day.      • epoetin vika-epbx (RETACRIT) 29752 UNIT/ML injection Inject 1 mL under the skin into the appropriate area as directed 3 (Three) Times a Week. Indications: ESRD on Dialysis 6.6 mL     • famotidine (PEPCID) 20 MG tablet Take 1 tablet by mouth Daily.      • Ferric Citrate 1  MG(Fe) tablet Take 4 tablets by mouth 3 (Three) Times a Day With Meals. 270 tablet     • fluticasone (FLONASE) 50 MCG/ACT nasal spray 2 sprays into the nostril(s) as directed by provider 2 (Two) Times a Day.      • insulin lispro (humaLOG) 100 UNIT/ML injection Inject 2-7 Units under the skin into the appropriate area as directed 3 (Three) Times a Day Before Meals.  12    • ipratropium-albuterol (DUO-NEB) 0.5-2.5 mg/3 ml nebulizer Take 3 mL by nebulization Every 4 (Four) Hours As Needed for Wheezing or Shortness of Air.      • lactobacillus acidophilus (RISAQUAD) capsule capsule Take 1 capsule by mouth Daily. 30 capsule 2    • metoclopramide (REGLAN) 5 MG tablet Take 1 tablet by mouth 4 (Four) Times a Day Before Meals & at Bedtime.      • ondansetron (ZOFRAN) 4 MG tablet Take 1 tablet by mouth Every 6 (Six) Hours As Needed for Nausea or Vomiting. 24 tablet 2    • pravastatin (PRAVACHOL) 10 MG tablet Take 10 mg by mouth Every Night.      • sertraline (ZOLOFT) 25 MG tablet Take 1 tablet by mouth Daily.      • timolol (TIMOPTIC) 0.5 % ophthalmic solution Administer 1 drop into the left eye Every Morning.  12        Medicines:  Current Facility-Administered Medications   Medication Dose Route Frequency Provider Last Rate Last Admin   • acetaminophen (TYLENOL) tablet 650 mg  650 mg Oral Q4H PRN Tejinder Kent MD         Or   • acetaminophen (TYLENOL) suppository 650 mg  650 mg Rectal Q4H PRN Tejinder Kent MD       • aspirin EC tablet 81 mg  81 mg Oral Daily Tejinder Kent MD       • atropine 1 % ophthalmic solution 1 drop  1 drop Left Eye Daily Tejinder Kent MD       • brimonidine (ALPHAGAN) 0.2 % ophthalmic solution 1 drop  1 drop Left Eye BID PRN Tejinder Kent MD       • cholecalciferol (VITAMIN D3) tablet 2,000 Units  2,000 Units Oral Daily Tejinder Kent MD       • dextrose (D50W) 25 g/ 50mL Intravenous Solution 25 g  25 g Intravenous Q15 Min PRN Tejinder Kent MD       • dextrose (GLUTOSE) oral gel 15 g  15 g Oral Q15 Min PRN Tejinder Kent MD       • epoetin vika-epbx (RETACRIT) injection 10,000 Units  10,000 Units Subcutaneous Once per day on Tue Thu Sat Tejinder Kent MD       • famotidine (PEPCID) tablet 20 mg  20 mg Oral Daily Tejinder Kent MD       • Ferric Citrate tablet 840 mg  4 tablet Oral TID With Meals Tejinder Kent MD       • fluticasone (FLONASE) 50 MCG/ACT nasal spray 2 spray  2 spray Nasal BID Tejinder Kent MD       • glucagon (human recombinant) (GLUCAGEN DIAGNOSTIC) injection 1 mg  1 mg Subcutaneous Q15 Min PRN Tejinder Kent MD       • insulin lispro (humaLOG) injection 2-7 Units  2-7 Units Subcutaneous TID AC Tejinder Kent MD       • ipratropium-albuterol (DUO-NEB) nebulizer solution 3 mL  3 mL Nebulization Q4H PRN Tejinder Kent MD       • lactated ringers infusion  50 mL/hr Intravenous Continuous Tejinder Kent MD 50 mL/hr at 06/17/21 0308 50 mL/hr at 06/17/21 0308   • lactobacillus acidophilus (RISAQUAD) capsule 1 capsule  1 capsule Oral Daily Tejinder Kent MD       • ondansetron (ZOFRAN) tablet 4 mg  4 mg Oral Q6H PRN Tejinder Kent MD        Or   • ondansetron (ZOFRAN) injection 4 mg  4 mg Intravenous Q6H PRN Tejinder Kent MD       • pravastatin (PRAVACHOL) tablet 10 mg  10 mg Oral Nightly Tejinder Kent MD       •  sertraline (ZOLOFT) tablet 25 mg  25 mg Oral Daily Tejinder Kent MD       • sodium chloride 0.9 % flush 10 mL  10 mL Intravenous PRN Tejinder Kent MD       • sodium chloride 0.9 % flush 10 mL  10 mL Intravenous Q12H Tejinder Kent MD       • sodium chloride 0.9 % flush 10 mL  10 mL Intravenous PRN Tejinder Kent MD       • timolol (TIMOPTIC) 0.5 % ophthalmic solution 1 drop  1 drop Left Eye Daily Tejinder Kent MD       • vancomycin oral solution reconstituted 125 mg  125 mg Oral Q6H Bin Guy DO           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    • Chronic otitis media    • Diabetes (CMS/[facility])    • End stage renal disease on dialysis (CMS/[facility])    • Eustachian tube dysfunction    • GERD (gastroesophageal reflux disease)    • Glaucoma    • HTN (hypertension)    • Hyperlipidemia    • Mucoid otitis media    • Noncompliance of patient with renal dialysis (CMS/[facility])    • Tobacco abuse        Past Surgical History:  Past Surgical History:   Procedure Laterality Date   • ARTERIOVENOUS FISTULA     • CATARACT EXTRACTION WITH INTRAOCULAR LENS IMPLANT     •  SECTION     • CHOLECYSTECTOMY     • MYRINGOTOMY W/ TUBES Bilateral    • MYRINGOTOMY W/ TUBES Right    • TUBAL ABDOMINAL LIGATION         Family History  Family History   Problem Relation Age of Onset   • Heart disease Mother    • Diabetes Father    • Colon cancer Neg Hx    • Colon polyps Neg Hx        Social History  Social History     Socioeconomic History   • Marital status: Single     Spouse name: Not on file   • Number of children: Not on file   • Years of education: Not on file   • Highest education level: Not on file   Tobacco Use   • Smoking status: Current Every Day Smoker     Packs/day: 2.00     Years: 6.00     Pack years: 12.00     Types: Cigarettes   • Smokeless tobacco: Never Used   Vaping Use   • Vaping Use: Never used   Substance and Sexual Activity  "  • Alcohol use: No   • Drug use: No   • Sexual activity: Defer         Review of Systems:  History obtained from chart review and the patient  General ROS: No fever or chills  Respiratory ROS: No cough, shortness of breath, wheezing  Cardiovascular ROS: No chest pain or palpitations  Gastrointestinal ROS: positive for - diarrhea  No abdominal pain or melena  Genito-Urinary ROS: No dysuria or hematuria  14 point ROS reviewed with the patient and negative except as noted above and in the HPI unless unable to obtain.    Objective:  Patient Vitals for the past 24 hrs:   BP Temp Temp src Pulse Resp SpO2 Height Weight   06/17/21 0930 108/53 -- -- 71 -- 96 % -- --   06/17/21 0900 116/71 -- -- 68 -- 97 % -- --   06/17/21 0830 101/47 -- -- 65 -- 97 % -- --   06/17/21 0800 98/56 -- -- 67 -- 96 % -- --   06/17/21 0730 133/58 -- -- 67 -- 94 % -- --   06/17/21 0720 (!) 120/103 95.5 °F (35.3 °C) Axillary 72 20 98 % -- --   06/17/21 0700 -- -- -- 70 -- 92 % -- --   06/17/21 0600 108/71 98.4 °F (36.9 °C) Oral 74 14 94 % -- --   06/17/21 0500 120/60 -- -- 71 -- 98 % -- --   06/17/21 0400 114/54 -- -- 69 -- 95 % -- --   06/17/21 0300 99/59 99.5 °F (37.5 °C) Oral 71 14 93 % -- 69.6 kg (153 lb 7 oz)   06/17/21 0201 -- -- -- 75 -- 96 % -- --   06/17/21 0200 92/53 -- -- -- -- -- -- --   06/17/21 0115 112/51 -- -- -- -- -- -- --   06/17/21 0114 -- -- -- 83 -- 95 % -- --   06/16/21 2245 103/52 -- -- 80 -- 94 % -- --   06/16/21 2215 126/59 -- -- 86 -- 96 % -- --   06/16/21 2130 109/51 -- -- 78 -- 95 % -- --   06/16/21 2030 100/44 -- -- 82 -- 95 % -- --   06/16/21 1933 (!) 89/47 -- -- 88 -- 95 % -- --   06/16/21 1845 114/68 -- -- 93 -- 96 % -- --   06/16/21 1806 -- -- -- -- -- 95 % -- --   06/16/21 1748 -- -- -- 90 -- -- -- --   06/16/21 1747 99/48 -- -- -- -- -- -- --   06/16/21 1704 -- 100.2 °F (37.9 °C) Oral -- -- -- -- --   06/16/21 1703 -- -- -- 92 -- 94 % -- --   06/16/21 1700 127/53 -- -- 97 22 93 % 147.3 cm (58\") 65.1 kg (143 lb 8 " oz)       Intake/Output Summary (Last 24 hours) at 6/17/2021 1004  Last data filed at 6/16/2021 2043  Gross per 24 hour   Intake 300 ml   Output --   Net 300 ml     General: lethargic  Chest:  clear to auscultation bilaterally without respiratory distress  CVS: regular rate and rhythm  Abdominal: soft, nontender, positive bowel sounds  Extremities: no cyanosis or edema  Skin: warm and dry without rash      Labs:  Results from last 7 days   Lab Units 06/17/21  0609 06/16/21  1706 06/16/21  1245   WBC 10*3/mm3 8.52 6.65 5.45   HEMOGLOBIN g/dL 9.9* 8.5* 8.9*   HEMATOCRIT % 32.3* 27.3* 28.0*   PLATELETS 10*3/mm3 130* 145 156         Results from last 7 days   Lab Units 06/17/21  0609 06/16/21  1706 06/16/21  1245   SODIUM mmol/L 135* 137 137   POTASSIUM mmol/L 3.8 3.1* 3.1*   CHLORIDE mmol/L 94* 95* 94*   CO2 mmol/L 28.0 28.0 28.0   BUN mg/dL 30* 25* 25*   CREATININE mg/dL 4.26* 4.06* 3.68*   CALCIUM mg/dL 9.2 9.1 9.0   BILIRUBIN mg/dL 0.8 0.6 0.6   ALK PHOS U/L 200* 199* 222*   ALT (SGPT) U/L 18 19 20   AST (SGOT) U/L 29 36* 36*   GLUCOSE mg/dL 97 113* 150*       Radiology:   Imaging Results (Last 72 Hours)     Procedure Component Value Units Date/Time    XR Femur 2 View Right [[id number]] Collected: 06/16/21 1933     Updated: 06/16/21 1937    Narrative:      EXAMINATION: XR FEMUR 2 VW RIGHT-     6/16/2021 6:40 PM CDT     HISTORY: rt leg pain, fall     Right femur, 4 views.     No femur fracture is seen.     The hip and knee are normal, to the extent visualized.     No soft tissue abnormality is seen.       Impression:      1. No acute bony abnormality.              This report was finalized on 06/16/2021 19:34 by Dr. Dharmesh Zuleta MD.    XR Pelvis 1 or 2 View [[id number]] Collected: 06/16/21 1931     Updated: 06/16/21 1936    Narrative:      EXAMINATION: XR PELVIS 1 OR 2 VW-     6/16/2021 6:40 PM CDT     HISTORY: fall, pain     Pelvis, one view.     Mildly displaced superior and inferior right pubic ramus fractures  are  not acute and are seen on a CT exam from 6/1/2021.     . SI joints are symmetric.     No sacral fracture is seen.     No hip fracture or dislocation.     Summary:  1. Old fractures of the right superior and inferior pubic ramus.  2. No acute fracture is seen.  This report was finalized on 06/16/2021 19:33 by Dr. Dharmesh Zuleta MD.    XR Hand 3+ View Right [[id number]] Collected: 06/16/21 1930     Updated: 06/16/21 1934    Narrative:      EXAMINATION: XR HAND 3+ VW RIGHT-     6/16/2021 6:40 PM CDT     HISTORY: Fall injury. Right hand pain.     Right hand, 3 views.     Osteopenia.     Chronic appearing small soft tissue calcifications are seen adjacent to  the third and fourth MCP joints.     Metacarpals and fingers show no sign of acute fracture.     The distal aspect of the second finger and third finger are obscured by  a Sensor and wire on some of the images.     Carpal bones align normally.     Summary:  1. No acute fracture is seen.     This report was finalized on 06/16/2021 19:31 by Dr. Dharmesh Zuleta MD.    XR Wrist 3+ View Right [[id number]] Collected: 06/16/21 1929     Updated: 06/16/21 1932    Narrative:      EXAMINATION: XR WRIST 3+ VW RIGHT-     6/16/2021 6:40 PM CDT     HISTORY: Fall injury. Right wrist pain.     Right wrist, 3 views.     Osteopenia.     Diffuse arterial calcification.     Intact distal radius and ulna.     Carpal bones align normally.     Proximal metacarpals are intact.     Summary:  1. No acute fracture.     This report was finalized on 06/16/2021 19:29 by Dr. Dharmesh Zuleta MD.    XR Chest 1 View [[id number]] Collected: 06/16/21 1927     Updated: 06/16/21 1932    Narrative:      EXAMINATION: XR CHEST 1 VW-     6/16/2021 6:40 PM CDT     HISTORY: fever, AMS     1 view chest x-ray compared with 6/7/2021.     Stable heart and mediastinum.  Aortic arch calcification.     Interval removal of a right jugular central line.     There is increased interstitial prominence compatible with  interstitial  edema and early heart failure.     There is an unchanged appearance of a moderate right pleural effusion.     Nonhealed fracture of the right humeral neck.  This finding was demonstrated on a radiograph from 4/1/2021.     Summary:  1. Increased interstitial prominence compatible with interstitial edema  and early failure.  2. Unchanged or minimally decreased moderate right pleural effusion.  This report was finalized on 06/16/2021 19:28 by Dr. Dharmesh Zuleta MD.    CT Cervical Spine Without Contrast [[id number]] Collected: 06/16/21 1827     Updated: 06/16/21 1832    Narrative:      EXAMINATION: CT CERVICAL SPINE WO CONTRAST-      6/16/2021 6:13 PM CDT     HISTORY: Fall injury. Neck pain. Altered mental status.     In order to have a CT radiation dose as low as reasonably achievable  Automated Exposure Control was utilized for adjustment of the mA and/or  KV according to patient size.     DLP in mGycm= 593.     Noncontrast cervical spine CT.     Comparison is made with cervical spine CT imaging from 3/26/2021.     The patient was unable to cooperate and the images are affected by  motion.     Mild right convex curvature on the coronal sequence is negative and  probably is positional.     The lateral view shows appropriate lordotic curvature with no vertebral  body malalignment.     Prevertebral soft tissues are normal.     Facet joints align normally.     Summary:  1. No acute fracture is seen.                                   This report was finalized on 06/16/2021 18:29 by Dr. Dharmesh Zuleta MD.    CT Head Without Contrast [[id number]] Collected: 06/16/21 1826     Updated: 06/16/21 1830    Narrative:      EXAMINATION: CT HEAD WO CONTRAST-      6/16/2021 6:13 PM CDT     HISTORY: Altered mental status.     In order to have a CT radiation dose as low as reasonably achievable  Automated Exposure Control was utilized for adjustment of the mA and/or  KV according to patient size.     DLP in mGycm= 594.      Noncontrast head CT.     Comparison is made with a noncontrast head CT from 6/1/2021.     Axial, sagittal, and coronal noncontrast CT imaging of the head.     The visualized paranasal sinuses are clear.     The brain and ventricles have an age appropriate appearance.   There is no hemorrhage or mass-effect.   No acute infarction is seen.     No calvarial abnormality.       Impression:      1. No acute intracranial abnormality is seen.  2. Stable chronic change.                                         This report was finalized on 06/16/2021 18:27 by Dr. Dharmesh Zuleta MD.          Culture:  No results found for: BLOODCX, URINECX, WOUNDCX, MRSACX, RESPCX, STOOLCX      Assessment   1.  End stage renal disease on HD TTS  2.  Metabolic encephalopathy  3.  Sepsis  4.  Type 2 diabetes with hypoglycemia  5.  Hypertension  6.  Anemia of CKD  7.  COPD  7.  Hypokalemia--improved  9.  C Diff colitis    Plan:  1.  Dialysis is due today  2.  Monitor labs  3.  Further assessment and plan pending Dr Gonzalez's evaluation of patient      Thank you for the consult, we appreciate the opportunity to provide care to your patients.  Feel free to contact me if I can be of any further assistance.      Gomez Lemos, ANTHONY  6/17/2021  10:04 CDT

## 2021-06-17 NOTE — PROGRESS NOTES
[facility] Medicine Services  INPATIENT PROGRESS NOTE    Patient Name: Mini Gentile  Date of Admission: 6/16/2021  Today's Date: 06/17/21  Length of Stay: 1  Primary Care Physician: Bharati Dahl APRN    Subjective   Chief Complaint: AMS/cdiff    HPI   Patient seen in CCU 8.  She admitted overnight from nursing home for her diarrhea and altered mental status.  Hypotensive.  She is arousable this morning but only will stay awake for a few seconds at a time.  She will answer questions.  She denies chest pain.  Denies shortness of breath.  Does substantiate that she been having bouts of diarrhea for at least several days nursing home but denies any overt abdominal pain.  She was recently here and treated for aspiration pneumonia with antibiotics.  Her C. difficile has come back positive.  She is afebrile.  Blood pressure soft still.        Review of Systems   All pertinent negatives and positives are as above. All other systems have been reviewed and are negative unless otherwise stated.     Objective    Temp:  [95.5 °F (35.3 °C)-100.2 °F (37.9 °C)] 95.5 °F (35.3 °C)  Heart Rate:  [67-97] 67  Resp:  [14-22] 20  BP: ()/() 98/56  Physical Exam  GEN: somnolent but easily awoken, interactive but will fall asleep frequently, NAD  HEENT: EOMI, Anicteric, Trachea midline  Lungs: no wheezing/rales/rhonchi  Heart: RRR, +S1/s2, no rub  ABD: soft, possible mild distension but +BS and no guarding/rebound  Extremities:  no cyanosis, no edema  Skin: no rashes or petechiae   Neuro: AAOx1-2, MONSALVE  Psych: difficult to assess at this time        Results Review:  I have reviewed the labs, radiology results, and diagnostic studies.    Laboratory Data:   Results from last 7 days   Lab Units 06/17/21  0609 06/16/21  1706 06/16/21  1245   WBC 10*3/mm3 8.52 6.65 5.45   HEMOGLOBIN g/dL 9.9* 8.5* 8.9*   HEMATOCRIT % 32.3* 27.3* 28.0*   PLATELETS 10*3/mm3 130* 145 156        Results from  last 7 days   Lab Units 06/17/21  0609 06/16/21  1706 06/16/21  1245   SODIUM mmol/L 135* 137 137   POTASSIUM mmol/L 3.8 3.1* 3.1*   CHLORIDE mmol/L 94* 95* 94*   CO2 mmol/L 28.0 28.0 28.0   BUN mg/dL 30* 25* 25*   CREATININE mg/dL 4.26* 4.06* 3.68*   CALCIUM mg/dL 9.2 9.1 9.0   BILIRUBIN mg/dL 0.8 0.6 0.6   ALK PHOS U/L 200* 199* 222*   ALT (SGPT) U/L 18 19 20   AST (SGOT) U/L 29 36* 36*   GLUCOSE mg/dL 97 113* 150*       Culture Data:   No results found for: BLOODCX, URINECX, WOUNDCX, MRSACX, RESPCX, STOOLCX    Radiology Data:   Imaging Results (Last 24 Hours)     Procedure Component Value Units Date/Time    XR Femur 2 View Right [[id number]] Collected: 06/16/21 1933     Updated: 06/16/21 1937    Narrative:      EXAMINATION: XR FEMUR 2 VW RIGHT-     6/16/2021 6:40 PM CDT     HISTORY: rt leg pain, fall     Right femur, 4 views.     No femur fracture is seen.     The hip and knee are normal, to the extent visualized.     No soft tissue abnormality is seen.       Impression:      1. No acute bony abnormality.              This report was finalized on 06/16/2021 19:34 by Dr. Dharmesh Zuleta MD.    XR Pelvis 1 or 2 View [[id number]] Collected: 06/16/21 1931     Updated: 06/16/21 1936    Narrative:      EXAMINATION: XR PELVIS 1 OR 2 VW-     6/16/2021 6:40 PM CDT     HISTORY: fall, pain     Pelvis, one view.     Mildly displaced superior and inferior right pubic ramus fractures are  not acute and are seen on a CT exam from 6/1/2021.     . SI joints are symmetric.     No sacral fracture is seen.     No hip fracture or dislocation.     Summary:  1. Old fractures of the right superior and inferior pubic ramus.  2. No acute fracture is seen.  This report was finalized on 06/16/2021 19:33 by Dr. Dharmesh Zuleta MD.    XR Hand 3+ View Right [[id number]] Collected: 06/16/21 1930     Updated: 06/16/21 1934    Narrative:      EXAMINATION: XR HAND 3+ VW RIGHT-     6/16/2021 6:40 PM CDT     HISTORY: Fall injury. Right hand pain.     Right  hand, 3 views.     Osteopenia.     Chronic appearing small soft tissue calcifications are seen adjacent to  the third and fourth MCP joints.     Metacarpals and fingers show no sign of acute fracture.     The distal aspect of the second finger and third finger are obscured by  a Sensor and wire on some of the images.     Carpal bones align normally.     Summary:  1. No acute fracture is seen.     This report was finalized on 06/16/2021 19:31 by Dr. Dharmesh Zuleta MD.    XR Wrist 3+ View Right [[id number]] Collected: 06/16/21 1929     Updated: 06/16/21 1932    Narrative:      EXAMINATION: XR WRIST 3+ VW RIGHT-     6/16/2021 6:40 PM CDT     HISTORY: Fall injury. Right wrist pain.     Right wrist, 3 views.     Osteopenia.     Diffuse arterial calcification.     Intact distal radius and ulna.     Carpal bones align normally.     Proximal metacarpals are intact.     Summary:  1. No acute fracture.     This report was finalized on 06/16/2021 19:29 by Dr. Dharmesh Zuleta MD.    XR Chest 1 View [[id number]] Collected: 06/16/21 1927     Updated: 06/16/21 1932    Narrative:      EXAMINATION: XR CHEST 1 VW-     6/16/2021 6:40 PM CDT     HISTORY: fever, AMS     1 view chest x-ray compared with 6/7/2021.     Stable heart and mediastinum.  Aortic arch calcification.     Interval removal of a right jugular central line.     There is increased interstitial prominence compatible with interstitial  edema and early heart failure.     There is an unchanged appearance of a moderate right pleural effusion.     Nonhealed fracture of the right humeral neck.  This finding was demonstrated on a radiograph from 4/1/2021.     Summary:  1. Increased interstitial prominence compatible with interstitial edema  and early failure.  2. Unchanged or minimally decreased moderate right pleural effusion.  This report was finalized on 06/16/2021 19:28 by Dr. Dharmesh Zuleta MD.    CT Cervical Spine Without Contrast [[id number]] Collected: 06/16/21 1827      Updated: 06/16/21 1832    Narrative:      EXAMINATION: CT CERVICAL SPINE WO CONTRAST-      6/16/2021 6:13 PM CDT     HISTORY: Fall injury. Neck pain. Altered mental status.     In order to have a CT radiation dose as low as reasonably achievable  Automated Exposure Control was utilized for adjustment of the mA and/or  KV according to patient size.     DLP in mGycm= 593.     Noncontrast cervical spine CT.     Comparison is made with cervical spine CT imaging from 3/26/2021.     The patient was unable to cooperate and the images are affected by  motion.     Mild right convex curvature on the coronal sequence is negative and  probably is positional.     The lateral view shows appropriate lordotic curvature with no vertebral  body malalignment.     Prevertebral soft tissues are normal.     Facet joints align normally.     Summary:  1. No acute fracture is seen.                                   This report was finalized on 06/16/2021 18:29 by Dr. Dharmesh Zuleta MD.    CT Head Without Contrast [[id number]] Collected: 06/16/21 1826     Updated: 06/16/21 1830    Narrative:      EXAMINATION: CT HEAD WO CONTRAST-      6/16/2021 6:13 PM CDT     HISTORY: Altered mental status.     In order to have a CT radiation dose as low as reasonably achievable  Automated Exposure Control was utilized for adjustment of the mA and/or  KV according to patient size.     DLP in mGycm= 594.     Noncontrast head CT.     Comparison is made with a noncontrast head CT from 6/1/2021.     Axial, sagittal, and coronal noncontrast CT imaging of the head.     The visualized paranasal sinuses are clear.     The brain and ventricles have an age appropriate appearance.   There is no hemorrhage or mass-effect.   No acute infarction is seen.     No calvarial abnormality.       Impression:      1. No acute intracranial abnormality is seen.  2. Stable chronic change.                                         This report was finalized on 06/16/2021 18:27 by   Dharmesh Zuleta MD.          I have reviewed the patient's current medications.     Assessment/Plan     Active Hospital Problems    Diagnosis    • **C. difficile diarrhea    • Diarrhea    • Hypokalemia    • Altered mental status    • End stage renal failure on dialysis (CMS/[facility])    • Diabetes (CMS/[facility])    • HTN (hypertension)        #1 C. difficile diarrhea -in the setting of recent antibiotics for aspiration pneumonia.  Appears to be more mild case.  Blood pressures are somewhat soft but I feel like that was an issue last hospital stay as well.  She does not have a fever or high white count.  Her belly is not overtly distended or tender.  Will DC IV vancomycin and cefepime.  Start p.o. vancomycin 125 every 6.  If patient unable to pass swallow eval will need to drop an NGT and start Vanco that way.    #2 altered mental status -patient also presented with all mental status last time she had an infection.  It took her several days to return to baseline.  Her head CT was nonacute.  She will extremities.  Treat underlying C. difficile and plan for dialysis.  Monitor closely.  No obvious focal deficits.    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  Left to look further back.  At this time regardless hold meds.  Blood pressure stable around 100 systolic.    #4 hypokalemia -resolved.  Likely in setting of diarrhea.    #5 ESRD -nephrology consulted.  Today is a normal dialysis day.  HD per them.    #6 DM2 -sliding scale insulin and hypoglycemia protocol.      Discharge Planning: Ongoing.  Blood pressure still soft.  Plan for dialysis today.  Monitor in ICU at this time given blood pressures.  Will reevaluate later.  Will likely need to go back to nursing facility at discharge.    Electronically signed by Bin Guy DO, 06/17/21, 08:39 CDT.

## 2021-06-17 NOTE — PAYOR COMM NOTE
"ADMITTED ON 6/16/2021    [facility]  ELSI,  [phone]  OR  FAX   [phone]    Mini Peña (61 y.o. Female)     Date of Birth Social Security Number Address Home Phone MRN    1960  [address] [place] [id number] [phone] [id number]    Episcopalian Marital Status          Other Single       Admission Date Admission Type Admitting Provider Attending Provider Department, Room/Bed    6/16/21 Emergency Bin Guy, Bin Valiente,  [facility] CARDIAC CARE, C008/1    Discharge Date Discharge Disposition Discharge Destination                       Attending Provider: Bin Guy DO    Allergies: Bupropion, Chantix [Varenicline], Gabapentin, Azithromycin, Levofloxacin, Penicillins, Sulfa Antibiotics    Isolation: Spore   Infection: C.difficile (06/17/21)   Code Status: CPR    Ht: 147.3 cm (58\")   Wt: 69.6 kg (153 lb 7 oz)    Admission Cmt: None   Principal Problem: C. difficile diarrhea [A04.72]                 Active Insurance as of 6/16/2021     Primary Coverage     Payor Plan Insurance Group Employer/Plan Group    [facility] MEDICARE REPLACEMENT      Payor Plan Address Payor Plan Phone Number Payor Plan Fax Number Effective Dates    [address] [address] [phone]  4/1/2021 - None Entered    [facility] [id number]       Subscriber Name Subscriber Birth Date Member ID       MINI PEÑA 1960 [id number]           Secondary Coverage     Payor Plan Insurance Group Employer/Plan Group    WELLCARE [facility] WELLCARE MEDICAID      Payor Plan Address Payor Plan Phone Number Payor Plan Fax Number Effective Dates    [address] [address] [phone]  11/4/2016 - None Entered    [facility] [id number]       Subscriber Name Subscriber Birth Date Member ID       PAOLAAMANDAMINI 1960 [id number]                 Emergency Contacts      (Rel.) Home Phone Work Phone Mobile Phone    Erica Bowling (Other) [phone] -- " "[phone]    Jose Bowling (Significant Other) [phone] -- [phone]    Arlette Barker (Daughter) -- -- [phone]    Julio Cesar Gentile (Son) [phone] -- [phone]          Patient Care Timeline (2021 16:55 to 2021 07:15)    2021 Event Details User   16:55 Patient arrival  Jessie Villalta RN   16:55 Patient roomed in ED To room 03 Jessie Villalta RN   16:56 HPI HPI (Adult)  Stated Reason for Visit: Patient arrived via [facility] from [place]. Per staff patient has altered mental status. Staff states that the a Jessie Villalta RN   16:56:02 Arrival Complaint ams     16:56:20 Chief Complaints Updated Altered Mental Status   Jessie Villalta RN   16:56:20 Trigger for Triage Start  Jessie Villalta RN   16:56:20 Triage Started  Jessie Villalta RN   16:57:13 Assign Nurse Jessie Schmitt RN assigned as Registered Nurse Jessie Schmitt RN   17:00 Vital Signs Vital Signs  Heart Rate: 97  Heart Rate Source: Monitor  Resp: 22  Resp Rate Source: Visual  BP: 127/53 (Device Time: 17:00:30)  BP Location: Right arm  BP Method: Automatic  Patient Position: Lying  BMI (Calculated): 30  Oxygen Therapy  SpO2: 93 %  Pulse Oximetry Type: Continuous  Device (Oxygen Therapy): room air  Vitals Timer  Restart Vitals Timer: Yes  Height and Weight  Height: 147.3 cm (58\")  Height Method: Stated  Weight: 65.1 kg (143 lb 8 oz)  Weight Method: Bed scale  Other flowsheet entries  Ideal Body Weight k.9 Jessie Schmitt RN     17:09:53 Neuro Cognitive (Adult) Neuro Cognitive (Adult)  Cognitive/Neuro/Behavioral WDL: WDL except; all  Level of Consciousness: Alert  Arousal Level: opens eyes spontaneously  Orientation: disoriented to; person; time; place; situation  Mood/Behavior: anxious  Additional Documentation: [place] Coma Scale (Group); NIH Stroke Scale (group)  Motor Response  Motor Response: general motor response  General Motor Response: purposeful motor response  Raisa Coma Scale  Best Eye Response: 4-->(E4) " spontaneous  Best Motor Response: 5-->(M5) localizes pain  Best Verbal Response: 4-->(V4) confused  [place] Coma Scale Score: 13  NIH Stroke Scale  Interval: baseline  1a. Level of Consciousness: 0-->Alert, keenly responsive  1b. LOC Questions: 2-->Answers neither question correctly  1c. LOC Commands: 2-->Performs neither task correctly  2. Best Gaze: 0-->Normal  3. Visual: 0-->No visual loss  4. Facial Palsy: 0-->Normal symmetrical movements  5a. Motor Arm, Left: 3-->No effort against gravity, limb falls  5b. Motor Arm, Right: 3-->No effort against gravity, limb falls  6a. Motor Leg, Left: 3-->No effort against gravity, leg falls to bed immediately  6b. Motor Leg, Right: 3-->No effort against gravity, leg falls to bed immediately  7. Limb Ataxia: 2-->Present in two limbs  8. Sensory: 0-->Normal, no sensory loss  9. Best Language: 0-->No aphasia, normal  10. Dysarthria: 0-->Normal  11. Extinction and Inattention (formerly Neglect): 0-->No abnormality  Total (NIH Stroke Scale): 18 Jessie Schmitt RN     17:11:02 Peripheral Neurovascular (Adult) Peripheral Neurovascular (Adult)  Peripheral Neurovascular WDL: WDL except; pulse assessment  Pulse Assessment: radial; dorsalis pedis  Additional Documentation: Edema (Group)  Pulse Radial  Left Radial Pulse: 2+ (normal); palpation  Right Radial Pulse: 2+ (normal); palpation  Pulse Dorsalis Pedis  Left Dorsalis Pedis Pulse: 1+ (weak); palpation  Right Dorsalis Pedis Pulse: 1+ (weak); palpation  Edema  Edema: foot, left; foot, right; leg, left; leg, right  Leg, Left Edema: 2+ (Mild)  Leg, Right Edema: 2+ (Mild)  Foot, Left Edema: 2+ (Mild)  Foot, Right Edema: 2+ (Mild) Jessie Schmitt RN     17:11:31 Respiratory Respiratory  Additional Documentation: Breath Sounds (Group)  Respiratory WDL  Respiratory WDL: WDL except; rhythm/pattern; expansion/retractions  Rhythm/Pattern, Respiratory: shortness of breath; tachypneic  Expansion/Accessory Muscles/Retractions: no retractions;  no use of accessory muscles  Breath Sounds  Breath Sounds: All Fields  All Lung Fields Breath Sounds: Anterior:; Lateral:; diminished Jessie Schmitt RN     17:53 Free Text Pt only makes small amt of urine; in/out cath was ordered and nursing staff was only able to obtain very small amt of urine. Lab could only do a urine culture, drug screen or UA on this; order for urine culture at this time.  Lia Bajwa APRN   17:53:14 Orders Placed cefepime (MAXIPIME) 1 g/100 mL 0.9% NS IVPB (mbp) ; vancomycin (VANCOCIN) in iso-osmotic dextrose IVPB 1 g (premix) 200 mL Lia Bajwa APRN   17:53:15 ED Medication Ordered  VANCOMYCIN HCL IN DEXTROSE 1-5 GM/200ML-% IV SOLN, CEFEPIME 1 G/100 ML 0.9% NS IVPB (MBP) Lia Bajwa APRN   17:54 Free Text Pt case discussed with Dr. Avitia who agrees with plan of care.  Lia Bajwa APRN     18:10 Respiratory Interventions (Adult) Respiratory Interventions (Adult)  Additional Documentation: Oxygen Therapy (Group)  Oxygen Therapy  Flow (L/min): 2  Device (Oxygen Therapy): nasal cannula Jessie Schmitt RN     18:37 Medication New Bag cefepime (MAXIPIME) 1 g/100 mL 0.9% NS IVPB (mbp) - Dose: 1 g ; Rate: 200 mL/hr ; Route: Intravenous ; Line: Peripheral IV 06/16/21 1749 Right Antecubital ; Scheduled Time: 1800 Jessie Schmitt RN     19:30 Medication New Bag vancomycin (VANCOCIN) in iso-osmotic dextrose IVPB 1 g (premix) 200 mL - Dose: 1,000 mg ; Route: Intravenous ; Line: Peripheral IV 06/16/21 1749 Right Antecubital ; Scheduled Time: 1800 Beth Camarillo RN     20:02 Free Text Pt case discussed with Dr. Kent; pt will be admitted to the ICU at this time in guarded condition.  Patient was hypoxic.  She is placed on O2 at 2 L via nasal cannula and her O2 sat is now 96%.  She continues to be altered.  Her troponin is elevated at 0.132 although it is always chronically elevated in the past.  She is anemic, however has been anemic in the past.  Lia Bajwa  VIKAS, APRN   20:03 Free Text Procalcitonin is elevated.  I am concerned about sepsis.  She was given vancomycin as well as cefepime for sepsis.  I did use renal dosing. BajwaLia mclean, APRN     03:08 Medication New Bag lactated ringers infusion - Dose: 50 mL/hr ; Rate: 50 mL/hr ; Route: Intravenous ; Line: Peripheral IV 06/16/21 1749 Right Antecubital ; Scheduled Time: 0234 Feng Lozano RN     04:23 Medication New Bag potassium chloride 20 mEq in 100 mL IVPB - Dose: 20 mEq ; Rate: 50 mL/hr ; Route: Intravenous ; Line: Peripheral IV 06/16/21 1749 Right Antecubital ; Scheduled Time: 0415 Feng Lozano RN     06:33 Medication New Bag potassium chloride 20 mEq in 100 mL IVPB - Dose: 20 mEq ; Rate: 50 mL/hr ; Route: Intravenous ; Line: Peripheral IV 06/16/21 1749 Right Antecubital ; Scheduled Time: 0615 Feng Lozano RN           07:15 Critical Care Adult PCS Body System Pain/Comfort/Sleep  Preferred Pain Scale: CPOT (Critical-Care Pain Observation Tool)  CPOT Facial Expression: 0-->relaxed, neutral  CPOT Body Movements: 0-->absence of movements  CPOT Muscle Tension: 0-->relaxed  Ventilator Compliance/Vocalization: 0-->talking in normal tone or no sound  CPOT Score: 0  Sleep/Rest/Relaxation: no problem identified; sleeping between care  Coping/Psychosocial  Observed Emotional State: calm; cooperative  Trust Relationship/Rapport: care explained  HEENT  HEENT WDL: WDL  Mouth/Teeth WDL  Mouth/Teeth WDL: WDL except; teeth  Teeth Symptoms: tooth/teeth missing  Cognitive  Cognitive/Neuro/Behavioral WDL: WDL except; all  Level of Consciousness: Responds to Voice  Arousal Level: opens eyes spontaneously  Orientation: disoriented to; time; situation  Mood/Behavior: calm; cooperative  Pupils  Pupil PERRLA: yes  Raisa Coma Scale  Best Eye Response: 4-->(E4) spontaneous  Best Motor Response: 6-->(M6) obeys commands  Best Verbal Response: 4-->(V4) confused  Raisa Coma Scale Score: 14  Motor Response  Motor Response:  general motor response  General Motor Response: purposeful motor response  Hand /Ankle Strength  Hand , Left: moderate  Hand , Right: moderate  Dorsiflexion, Left: weak  Dorsiflexion, Right: weak  Plantarflexion, Left: weak  Plantarflexion, Right: weak  Intellectual Performance WDL  Level of Consciousness: Responds to Voice  Respiratory  Respiratory WDL: WDL except; breath sounds; effort/expansion; rhythm/pattern  Breath Sounds  Breath Sounds: All Fields  All Lung Fields Breath Sounds: Anterior:; Lateral:; diminished  Oxygen Therapy  Device (Oxygen Therapy): room air  Cardiac  Cardiac WDL: WDL  ECG  Lead Monitored: Lead II  Rhythm: normal sinus rhythm  Peripheral Neurovascular  Peripheral Neurovascular WDL: WDL  Pulse Assessment: radial; dorsalis pedis  VTE Prevention/Management: bilateral; sequential compression devices on  Pulse Radial  Left Radial Pulse: 2+ (normal); palpation  Right Radial Pulse: 2+ (normal); palpation  Pulse Dorsalis Pedis  Left Dorsalis Pedis Pulse: 1+ (weak); palpation  Right Dorsalis Pedis Pulse: 1+ (weak); palpation  Pulse Posterior Tibial  Left Posterior Tibial Pulse: 1+ (weak); palpation  Right Posterior Tibial Pulse: 1+ (weak); palpation  Edema  Edema: foot, left; foot, right; leg, left; leg, right  Leg, Left Edema: 2+ (Mild)  Leg, Right Edema: 2+ (Mild)  Foot, Left Edema: 2+ (Mild)  Foot, Right Edema: 2+ (Mild)  Gastrointestinal  Gastrointestinal WDL: WDL except; stool; GI symptoms  GI Signs/Symptoms: diarrhea  Last Bowel Movement: 06/17/21  Genitourinary  Genitourinary WDL: WDL except; voiding ability/characteristics  Voiding Characteristics: incontinence  Skin  Skin WDL: WDL except; color; characteristics  Skin Color/Characteristics: bronze  Skin Integrity: wound; pressure injury  Rogelio Risk Assessment  Sensory Perception: 3-->slightly limited  Moisture: 2-->very moist  Activity: 1-->bedfast  Mobility: 2-->very limited  Nutrition: 2-->probably inadequate  Friction and  Shear: 2-->potential problem  Rogelio Score: 12  Skin Interventions  Pressure Reduction Devices: pressure-redistributing mattress utilized  Pressure Reduction Techniques: weight shift assistance provided; heels elevated off bed  Skin Protection: adhesive use limited; incontinence pads utilized  Musculoskeletal  Musculoskeletal WDL: WDL except; extremity movement; mobility  General Mobility: generalized weakness  Extremity Movement: RUE  RUE Extremity Movement: active ROM moderately impaired  Nutrition  Diet/Nutrition Received: NPO  Sepsis Screening Options  Which Sepsis Screen to be Used?: Adult Sepsis Screen  Adult Sepsis Screening Tool  Previous adult screen positive in last 48 hrs?: no  1. Criteria Present: acute mental status change  Are 2 or > of the above criteria present?: no: STOP/negative screen  Safety  Safety WDL: WDL  [place] High Risk Falls Assessment (If Fall score is >/=13, add the Fall Risk CPG to the care plan)   Fallen in past 6 months: 5--> Yes  Mental Status: 2--> confused  Elimination: 0--> No elimination issues  Mobility: 2--> Requires assistance- transfer, walker, etc.  Medications: 1--> Insulin/ Oral hypoglycemic  Nurses' Clinical Judgement: 9  Total Fall Risk Score: 20  Safety Management  Safety Promotion/Fall Prevention: safety round/check completed; fall prevention program maintained  Enhanced Safety Measures: bed alarm set  Medication Review/Management: medications reviewed  Daily Care  Activity Management: activity adjusted per tolerance  Positioning  Body Position: supine Adi Vásquez Daniel, RN   Registered Nurse      Plan of Care   Signed   Date of Service:  06/17/21 0816   Creation Time:  06/17/21 0816            Signed             Goal Outcome Evaluation:   Pt drowsy and oriented to only person. Frequent complaints of pain on right side of body, particularly right arm. Complaint of back pain intermittently. Blood pressures adequate, around  100-110 systolic. Room air with good sat. Positive for C - diff with multiple bowel movements. Continue to monitor.                Trish Brandt   Registered Dietitian   Nutrition   Plan of Care   Signed   Date of Service:  06/17/21 1011   Creation Time:  06/17/21 1011            Signed             Goal Outcome Evaluation:  Plan of Care Reviewed With: other (see comments)  Progress: no change  Outcome Summary: Initial nutrition assessment. Pt is admitted with AMS. She is currently NPO d/t failed RN dysphagia screen. She has a pending ST consult. She was recently treated for aspiration PNA with IV ATB's.  She has ESRD and receives HD 3x/week. Noted non-compliance with HD at times. Pt currently also has C.Diff and has had multiple BM's. She resides at a SNF. Will follow for ST diet consistency recommendations.                     History & Physical      Tejinder Kent MD at 06/16/21 2350                [facility] Medicine Services  HISTORY AND PHYSICAL    Date of Admission: 6/16/2021  Primary Care Physician: Bharati Dahl APRN    Subjective     Chief Complaint: Altered    History of Present Illness  Patient is a 61-year-old white female past medical history of end-stage renal disease on Tuesday Thursday Saturday dialysis.  She has a significant history of noncompliance with dialysis sessions.  She does reside in a nursing home she was sent over here from [place] with altered mental status.  All the history of obtained is from the ER chart and discussing with the ER attending.  Apparently she was found laying face down in her bed today confused unsure what time she was last known well.  Patient will give me any history she does states she has diarrhea otherwise just moaning in bed.  She is had extensive work-up in the ER including CTs which show nothing acute she does have known pelvic rim fractures which are old.  White counts normal creatinine is 4, she is anemic with  hemoglobin 8.5 approximately baseline.  Troponin is chronically elevated she does have a significantly elevated procalcitonin at 12.46, she is also hypokalemic with potassium 3.1, mag and Phos are normal.  A urine was obtained but only enough was available to either do a UDS, urinalysis, or urine culture therefore culture is pending.  She is somewhat hypotensive at times concerning for sepsis.  She has recently been on IV antibiotics for an aspiration pneumonia.  Unable to determine if the diarrhea is chronic there is some documentation of that or if this is a recurrence.  I have ordered a stool C. difficile and PCR panel.  She is being admitted with altered mental status and possible sepsis.        Review of Systems   Unable to obtain patient is obtunded.  Otherwise complete ROS reviewed and negative except as mentioned in the HPI.    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    • Chronic otitis media    • Diabetes (CMS/[facility])    • End stage renal disease on dialysis (CMS/[facility])    • Eustachian tube dysfunction    • GERD (gastroesophageal reflux disease)    • Glaucoma    • HTN (hypertension)    • Hyperlipidemia    • Mucoid otitis media    • Noncompliance of patient with renal dialysis (CMS/[facility])    • Tobacco abuse      Past Surgical History:  Past Surgical History:   Procedure Laterality Date   • ARTERIOVENOUS FISTULA     • CATARACT EXTRACTION WITH INTRAOCULAR LENS IMPLANT     •  SECTION     • CHOLECYSTECTOMY     • MYRINGOTOMY W/ TUBES Bilateral    • MYRINGOTOMY W/ TUBES Right    • TUBAL ABDOMINAL LIGATION       Social History:  reports that she has been smoking cigarettes. She has a 12.00 pack-year smoking history. She has never used smokeless tobacco. She reports that she does not drink alcohol and does not use drugs.    Family History: family history includes Diabetes in her father; Heart disease in her mother.        Allergies:  Allergies   Allergen Reactions   • Bupropion Nausea Only   • Chantix [Varenicline] Other (See Comments)     Does not remember   • Gabapentin Hallucinations     hallucinations   • Azithromycin Rash   • Levofloxacin Rash   • Penicillins Rash   • Sulfa Antibiotics Rash       Medications:  Prior to Admission medications    Medication Sig Start Date End Date Taking? Authorizing Provider   albuterol sulfate  (90 Base) MCG/ACT inhaler Inhale 2 puffs Every 4 (Four) Hours As Needed for Wheezing.  Patient taking differently: Inhale 2 puffs Every 6 (Six) Hours As Needed for Wheezing. 1/21/20   Sundeep Aldridge MD   aspirin 81 MG EC tablet Take 81 mg by mouth Daily.    ProviderSteve MD   atropine 1 % ophthalmic solution Administer 1 drop into the left eye Daily.    Steve Warren MD   brimonidine (ALPHAGAN) 0.2 % ophthalmic solution Administer 1 drop into the left eye 2 (Two) Times a Day As Needed.    Steve Warren MD   carvedilol (COREG) 6.25 MG tablet Take 6.25 mg by mouth 2 (Two) Times a Day With Meals.    Steve Warren MD   cholecalciferol (VITAMIN D3) 1000 units tablet Take 2,000 Units by mouth Daily.    Steve Warren MD   dorzolamide (TRUSOPT) 2 % ophthalmic solution Administer 1 drop into the left eye 2 (Two) Times a Day.    Steve Warren MD   epoetin vika-epbx (RETACRIT) 76861 UNIT/ML injection Inject 1 mL under the skin into the appropriate area as directed 3 (Three) Times a Week. Indications: ESRD on Dialysis 6/9/21   Sundeep Aldridge MD   famotidine (PEPCID) 20 MG tablet Take 1 tablet by mouth Daily. 6/8/21   Sundeep Aldridge MD   Ferric Citrate 1  MG(Fe) tablet Take 4 tablets by mouth 3 (Three) Times a Day With Meals. 6/8/21   Sundeep Aldridge MD   fluticasone (FLONASE) 50 MCG/ACT nasal spray 2 sprays into the nostril(s) as directed by provider 2 (Two) Times a Day.    Steve Warren MD   insulin  "lispro (humaLOG) 100 UNIT/ML injection Inject 2-7 Units under the skin into the appropriate area as directed 3 (Three) Times a Day Before Meals. 6/8/21   Sundeep Aldridge MD   ipratropium-albuterol (DUO-NEB) 0.5-2.5 mg/3 ml nebulizer Take 3 mL by nebulization Every 4 (Four) Hours As Needed for Wheezing or Shortness of Air.    Provider, MD Steve   lactobacillus acidophilus (RISAQUAD) capsule capsule Take 1 capsule by mouth Daily. 6/8/21   Sundeep Aldridge MD   metoclopramide (REGLAN) 5 MG tablet Take 1 tablet by mouth 4 (Four) Times a Day Before Meals & at Bedtime. 6/8/21   Sundeep Aldridge MD   ondansetron (ZOFRAN) 4 MG tablet Take 1 tablet by mouth Every 6 (Six) Hours As Needed for Nausea or Vomiting. 6/8/21   Sundeep Aldridge MD   pravastatin (PRAVACHOL) 10 MG tablet Take 10 mg by mouth Every Night.    Provider, MD Steve   sertraline (ZOLOFT) 25 MG tablet Take 1 tablet by mouth Daily. 6/8/21   Sundeep Aldridge MD   timolol (TIMOPTIC) 0.5 % ophthalmic solution Administer 1 drop into the left eye Every Morning. 6/8/21   Sundeep Aldridge MD     I have utilized all available immediate resources to obtain, update, and review the patient's current medications.    Objective     Vital Signs: BP 92/53   Pulse 75   Temp 100.2 °F (37.9 °C) (Oral)   Resp 22   Ht 147.3 cm (58\")   Wt 65.1 kg (143 lb 8 oz)   SpO2 96%   BMI 29.99 kg/m²   Physical Exam   Gen.: Chronically ill-appearing white female in no acute distress moaning in the bed  HEENT: Atraumatic, normocephalic.  Pupils equal, round, and reactive to light. Extraocular movements intact.  Sclerae anicteric.  External ears negative.  Mucous membranes moist.  Neck is supple without lymphadenopathy.  No JVD is noted.  No carotid bruits are auscultated.  Oropharynx is without erythema or exudate.   Chest: Clear to auscultation and percussion.  CV: Regular rate and rhythm.  Normal S1-S2.  No gallops, " murmurs. or rubs.  Abdomen: Soft, nontender, nondistended.  Active bowel sounds,  No hepatosplenomegaly.  No masses.  No hernias.  Liquid stool in diaper  Extremities: No clubbing, edema, or cyanosis.  Capillary refill is normal.  Pulses are 2+ and symmetric at radial and dorsalis pedis.  Posterior tibial pulses are intact and 2+ palpable.  Neuro: Patient is arousable but obtunded x 0.  Cranial nerves II through XII are grossly intact.  Motor is moves all extremities.  DTRs are 2+ and symmetric bilaterally. Sensory exam is nonfocal  Skin: Warm, dry, and intact.  No evidence of breakdown.  Sensorium: Patient is obtunded    Nursing notes and vital signs reviewed        Results Reviewed:  Lab Results (last 24 hours)     Procedure Component Value Units Date/Time    Clostridium Difficile Toxin - Stool, Per Rectum [[id number]] Collected: 06/17/21 0212    Specimen: Stool from Per Rectum Updated: 06/17/21 0217    Narrative:      The following orders were created for panel order Clostridium Difficile Toxin - Stool, Per Rectum.  Procedure                               Abnormality         Status                     ---------                               -----------         ------                     Clostridium Difficile To...[[id number]]                      In process                   Please view results for these tests on the individual orders.    Gastrointestinal Panel, PCR - Stool, Per Rectum [[id number]] Collected: 06/17/21 0212    Specimen: Stool from Per Rectum Updated: 06/17/21 0217    Clostridium Difficile Toxin, PCR - Stool, Per Rectum [[id number]] Collected: 06/17/21 0212    Specimen: Stool from Per Rectum Updated: 06/17/21 0217    Timed Lactic Acid, Reflex [[id number]]  (Abnormal) Collected: 06/16/21 2050    Specimen: Blood Updated: 06/16/21 2119     Lactate 2.3 mmol/L     COVID-19,[facility] IN-HOUSE,Nasal Swab No Transport Media 3-4 HR TAT - Swab, Nasal Cavity [[id number]]  (Normal) Collected: 06/16/21 1813     Specimen: Swab from Nasal Cavity Updated: 06/16/21 1912     COVID19 Not Detected    Narrative:      Fact sheet for providers: https://www.fda.gov/media/789090/download     Fact sheet for patients: https://www.fda.gov/media/840898/download    Test performed by PCR.    Consider negative results in combination with clinical observations, patient history, and epidemiological information.    Manual Differential [[id number]]  (Abnormal) Collected: 06/16/21 1706    Specimen: Blood Updated: 06/16/21 1839     Neutrophil % 87.0 %      Lymphocyte % 4.0 %      Monocyte % 3.0 %      Bands %  6.0 %      Neutrophils Absolute 6.18 10*3/mm3      Lymphocytes Absolute 0.27 10*3/mm3      Monocytes Absolute 0.20 10*3/mm3      nRBC 2.0 /100 WBC      Anisocytosis Slight/1+     Macrocytes Slight/1+     Polychromasia Slight/1+     WBC Morphology Normal     Platelet Estimate Adequate    CBC & Differential [[id number]]  (Abnormal) Collected: 06/16/21 1706    Specimen: Blood Updated: 06/16/21 1839    Narrative:      The following orders were created for panel order CBC & Differential.  Procedure                               Abnormality         Status                     ---------                               -----------         ------                     CBC Auto Differential[[id number]]        Abnormal            Final result                 Please view results for these tests on the individual orders.    CBC Auto Differential [[id number]]  (Abnormal) Collected: 06/16/21 1706    Specimen: Blood Updated: 06/16/21 1839     WBC 6.65 10*3/mm3      RBC 2.71 10*6/mm3      Hemoglobin 8.5 g/dL      Hematocrit 27.3 %      .7 fL      MCH 31.4 pg      MCHC 31.1 g/dL      RDW 17.7 %      RDW-SD 64.7 fl      MPV 9.5 fL      Platelets 145 10*3/mm3      nRBC 1.1 /100 WBC     Lactic Acid, Plasma [[id number]]  (Abnormal) Collected: 06/16/21 1749    Specimen: Blood Updated: 06/16/21 1818     Lactate 2.8 mmol/L     [place] Draw [[id number]] Collected:  06/16/21 1706    Specimen: Blood Updated: 06/16/21 1817    Narrative:      The following orders were created for panel order [place] Draw.  Procedure                               Abnormality         Status                     ---------                               -----------         ------                     Green Top (Gel)[[id number]]                                  Final result               Lavender Top[[id number]]                                     Final result               Red Top[[id number]]                                          Final result                 Please view results for these tests on the individual orders.    Green Top (Gel) [[id number]] Collected: 06/16/21 1706    Specimen: Blood Updated: 06/16/21 1817     Extra Tube Hold for add-ons.     Comment: Auto resulted.       Lavender Top [[id number]] Collected: 06/16/21 1706    Specimen: Blood Updated: 06/16/21 1817     Extra Tube hold for add-on     Comment: Auto resulted       Red Top [[id number]] Collected: 06/16/21 1706    Specimen: Blood Updated: 06/16/21 1817     Extra Tube Hold for add-ons.     Comment: Auto resulted.       Blood Culture - Blood, Arm, Right [[id number]] Collected: 06/16/21 1740    Specimen: Blood from Arm, Right Updated: 06/16/21 1802    Blood Culture - Blood, Arm, Right [[id number]] Collected: 06/16/21 1749    Specimen: Blood from Arm, Right Updated: 06/16/21 1802    Urine Culture - Urine, Urine, Catheter [[id number]] Collected: 06/16/21 1730    Specimen: Urine, Catheter Updated: 06/16/21 1754    Troponin [[id number]]  (Abnormal) Collected: 06/16/21 1706    Specimen: Blood Updated: 06/16/21 1751     Troponin T 0.136 ng/mL     Narrative:      Troponin T Reference Range:  <= 0.03 ng/mL-   Negative for AMI  >0.03 ng/mL-     Abnormal for myocardial necrosis.  Clinicians would have to utilize clinical acumen, EKG, Troponin and serial changes to determine if it is an Acute Myocardial Infarction or myocardial injury due to an underlying  "chronic condition.       Results may be falsely decreased if patient taking Biotin.      Protime-INR [[id number]]  (Abnormal) Collected: 06/16/21 1725    Specimen: Blood Updated: 06/16/21 1749     Protime 18.9 Seconds      INR 1.72    aPTT [[id number]]  (Abnormal) Collected: 06/16/21 1725    Specimen: Blood Updated: 06/16/21 1749     PTT 44.0 seconds     Procalcitonin [[id number]]  (Abnormal) Collected: 06/16/21 1706    Specimen: Blood Updated: 06/16/21 1745     Procalcitonin 12.46 ng/mL     Narrative:      As a Marker for Sepsis (Non-Neonates):     1. <0.5 ng/mL represents a low risk of severe sepsis and/or septic shock.  2. >2 ng/mL represents a high risk of severe sepsis and/or septic shock.    As a Marker for Lower Respiratory Tract Infections that require antibiotic therapy:  PCT on Admission     Antibiotic Therapy             6-12 Hrs later  >0.5                          Strongly Recommended            >0.25 - <0.5             Recommended  0.1 - 0.25                  Discouraged                       Remeasure/reassess PCT  <0.1                         Strongly Discouraged         Remeasure/reassess PCT      As 28 day mortality risk marker: \"Change in Procalcitonin Result\" (>80% or <=80%) if Day 0 (or Day 1) and Day 4 values are available. Refer to http://[email or web address]-pct-calculator.com/    Change in PCT <=80 %   A decrease of PCT levels below or equal to 80% defines a positive change in PCT test result representing a higher risk for 28-day all-cause mortality of patients diagnosed with severe sepsis or septic shock.    Change in PCT >80 %   A decrease of PCT levels of more than 80% defines a negative change in PCT result representing a lower risk for 28-day all-cause mortality of patients diagnosed with severe sepsis or septic shock.                Comprehensive Metabolic Panel [[id number]]  (Abnormal) Collected: 06/16/21 1706    Specimen: Blood Updated: 06/16/21 1741     Glucose 113 mg/dL      BUN 25 mg/dL      " Creatinine 4.06 mg/dL      Sodium 137 mmol/L      Potassium 3.1 mmol/L      Chloride 95 mmol/L      CO2 28.0 mmol/L      Calcium 9.1 mg/dL      Total Protein 5.9 g/dL      Albumin 3.50 g/dL      ALT (SGPT) 19 U/L      AST (SGOT) 36 U/L      Alkaline Phosphatase 199 U/L      Total Bilirubin 0.6 mg/dL      eGFR Non African Amer 11 mL/min/1.73      Comment: <15 Indicative of kidney failure.        eGFR   Amer --     Comment: <15 Indicative of kidney failure.        Globulin 2.4 gm/dL      A/G Ratio 1.5 g/dL      BUN/Creatinine Ratio 6.2     Anion Gap 14.0 mmol/L     Narrative:      GFR Normal >60  Chronic Kidney Disease <60  Kidney Failure <15      CK [[id number]]  (Normal) Collected: 06/16/21 1706    Specimen: Blood Updated: 06/16/21 1740     Creatine Kinase 37 U/L     Salicylate Level [[id number]]  (Normal) Collected: 06/16/21 1706    Specimen: Blood Updated: 06/16/21 1739     Salicylate <0.3 mg/dL     Acetaminophen Level [[id number]]  (Normal) Collected: 06/16/21 1706    Specimen: Blood Updated: 06/16/21 1739     Acetaminophen <5.0 mcg/mL     Phosphorus [[id number]]  (Normal) Collected: 06/16/21 1706    Specimen: Blood Updated: 06/16/21 1738     Phosphorus 2.5 mg/dL     Ethanol [[id number]] Collected: 06/16/21 1706    Specimen: Blood Updated: 06/16/21 1736     Ethanol % <0.010 %     Narrative:      Not for legal purposes. Chain of Custody not followed.     Magnesium [[id number]]  (Normal) Collected: 06/16/21 1706    Specimen: Blood Updated: 06/16/21 1735     Magnesium 1.9 mg/dL     Blood Gas, Arterial - [[id number]]  (Abnormal) Collected: 06/16/21 1723    Specimen: Arterial Blood Updated: 06/16/21 1723     Site Right Radial     Rustam's Test Positive     pH, Arterial 7.506 pH units      Comment: 83 Value above reference range        pCO2, Arterial 35.8 mm Hg      pO2, Arterial 54.0 mm Hg      Comment: 85 Value below critical limit        HCO3, Arterial 28.3 mmol/L      Comment: 83 Value above reference range         Base Excess, Arterial 5.0 mmol/L      Comment: 83 Value above reference range        O2 Saturation, Arterial 91.5 %      Comment: 84 Value below reference range        Temperature 37.0 C      Barometric Pressure for Blood Gas 750 mmHg      Modality Room Air     Ventilator Mode NA     Notified Who HOLLIE HANNAH     Notified By [id number]     Notified Time 06/16/2021 17:28     Collected by [id number]     Comment: Meter: [id number]     :  [id number]        pCO2, Temperature Corrected 35.8 mm Hg      pH, Temp Corrected 7.506 pH Units      pO2, Temperature Corrected 54.0 mm Hg         Imaging Results (Last 24 Hours)     Procedure Component Value Units Date/Time    XR Femur 2 View Right [[id number]] Collected: 06/16/21 1933     Updated: 06/16/21 1937    Narrative:      EXAMINATION: XR FEMUR 2 VW RIGHT-     6/16/2021 6:40 PM CDT     HISTORY: rt leg pain, fall     Right femur, 4 views.     No femur fracture is seen.     The hip and knee are normal, to the extent visualized.     No soft tissue abnormality is seen.       Impression:      1. No acute bony abnormality.              This report was finalized on 06/16/2021 19:34 by Dr. Dharmesh Zuleta MD.    XR Pelvis 1 or 2 View [[id number]] Collected: 06/16/21 1931     Updated: 06/16/21 1936    Narrative:      EXAMINATION: XR PELVIS 1 OR 2 VW-     6/16/2021 6:40 PM CDT     HISTORY: fall, pain     Pelvis, one view.     Mildly displaced superior and inferior right pubic ramus fractures are  not acute and are seen on a CT exam from 6/1/2021.     . SI joints are symmetric.     No sacral fracture is seen.     No hip fracture or dislocation.     Summary:  1. Old fractures of the right superior and inferior pubic ramus.  2. No acute fracture is seen.  This report was finalized on 06/16/2021 19:33 by Dr. Dharmesh Zuleta MD.    XR Hand 3+ View Right [[id number]] Collected: 06/16/21 1930     Updated: 06/16/21 1934    Narrative:      EXAMINATION: XR HAND 3+ VW RIGHT-     6/16/2021  6:40 PM CDT     HISTORY: Fall injury. Right hand pain.     Right hand, 3 views.     Osteopenia.     Chronic appearing small soft tissue calcifications are seen adjacent to  the third and fourth MCP joints.     Metacarpals and fingers show no sign of acute fracture.     The distal aspect of the second finger and third finger are obscured by  a Sensor and wire on some of the images.     Carpal bones align normally.     Summary:  1. No acute fracture is seen.     This report was finalized on 06/16/2021 19:31 by Dr. Dharmesh Zuleta MD.    XR Wrist 3+ View Right [[id number]] Collected: 06/16/21 1929     Updated: 06/16/21 1932    Narrative:      EXAMINATION: XR WRIST 3+ VW RIGHT-     6/16/2021 6:40 PM CDT     HISTORY: Fall injury. Right wrist pain.     Right wrist, 3 views.     Osteopenia.     Diffuse arterial calcification.     Intact distal radius and ulna.     Carpal bones align normally.     Proximal metacarpals are intact.     Summary:  1. No acute fracture.     This report was finalized on 06/16/2021 19:29 by Dr. Dharmesh Zuleta MD.    XR Chest 1 View [[id number]] Collected: 06/16/21 1927     Updated: 06/16/21 1932    Narrative:      EXAMINATION: XR CHEST 1 VW-     6/16/2021 6:40 PM CDT     HISTORY: fever, AMS     1 view chest x-ray compared with 6/7/2021.     Stable heart and mediastinum.  Aortic arch calcification.     Interval removal of a right jugular central line.     There is increased interstitial prominence compatible with interstitial  edema and early heart failure.     There is an unchanged appearance of a moderate right pleural effusion.     Nonhealed fracture of the right humeral neck.  This finding was demonstrated on a radiograph from 4/1/2021.     Summary:  1. Increased interstitial prominence compatible with interstitial edema  and early failure.  2. Unchanged or minimally decreased moderate right pleural effusion.  This report was finalized on 06/16/2021 19:28 by Dr. Dharmesh Zuleta MD.    CT Cervical  Spine Without Contrast [[id number]] Collected: 06/16/21 1827     Updated: 06/16/21 1832    Narrative:      EXAMINATION: CT CERVICAL SPINE WO CONTRAST-      6/16/2021 6:13 PM CDT     HISTORY: Fall injury. Neck pain. Altered mental status.     In order to have a CT radiation dose as low as reasonably achievable  Automated Exposure Control was utilized for adjustment of the mA and/or  KV according to patient size.     DLP in mGycm= 593.     Noncontrast cervical spine CT.     Comparison is made with cervical spine CT imaging from 3/26/2021.     The patient was unable to cooperate and the images are affected by  motion.     Mild right convex curvature on the coronal sequence is negative and  probably is positional.     The lateral view shows appropriate lordotic curvature with no vertebral  body malalignment.     Prevertebral soft tissues are normal.     Facet joints align normally.     Summary:  1. No acute fracture is seen.                                   This report was finalized on 06/16/2021 18:29 by Dr. Dharmesh Zuleta MD.    CT Head Without Contrast [[id number]] Collected: 06/16/21 1826     Updated: 06/16/21 1830    Narrative:      EXAMINATION: CT HEAD WO CONTRAST-      6/16/2021 6:13 PM CDT     HISTORY: Altered mental status.     In order to have a CT radiation dose as low as reasonably achievable  Automated Exposure Control was utilized for adjustment of the mA and/or  KV according to patient size.     DLP in mGycm= 594.     Noncontrast head CT.     Comparison is made with a noncontrast head CT from 6/1/2021.     Axial, sagittal, and coronal noncontrast CT imaging of the head.     The visualized paranasal sinuses are clear.     The brain and ventricles have an age appropriate appearance.   There is no hemorrhage or mass-effect.   No acute infarction is seen.     No calvarial abnormality.       Impression:      1. No acute intracranial abnormality is seen.  2. Stable chronic change.                                          This report was finalized on 06/16/2021 18:27 by Dr. Dharmesh Zuleta MD.        I have personally reviewed and interpreted the radiology studies and ECG obtained at time of admission.     Assessment / Plan     Assessment:   Active Hospital Problems    Diagnosis    • **Altered mental status    • Diarrhea    • Hypokalemia    • End stage renal failure on dialysis (CMS/[facility])    • Diabetes (CMS/[facility])    • HTN (hypertension)    1.  Altered mental status uncertain if this is related to sepsis we will give adjacent a small bolus of LR she had cultures of blood and urine obtained.  We will continue vancomycin and cefepime for now also obtain stool for C. difficile and stool PCR samples.  Serial neuro exams hopefully she will improve with antibiotics and gentle hydration.  2.  Hypotension history of hypertension currently though hypotensive hold blood pressure medications.  Gentle hydration.  3.  Diarrhea check stool panel, and C. Difficile  4.  End-stage renal disease on dialysis consult nephrology.  5.  Type 2 diabetes Accu-Chek sliding scale insulin as needed.  6.  Anemia chronic stable Procrit or equivalent will be continued.  7.  Home medications reviewed and resumed as appropriate.    Patient seen prior to midnight       Code Status/Advanced Care Plan: Full    The patient's surrogate decision maker is unable to determine at this time.     I discussed my findings and recommendations with the ER attending.    Estimated length of stay is greater than 2 nights.     The patient was seen and examined by me on June 16, 2021 at 11:50 PM.    Electronically signed by Tejinder Kent MD, 06/17/21, 02:21 CDT.                Electronically signed by Tejinder Kent MD at 06/17/21 0232          Emergency Department Notes      Lia Bajwa APRN at 06/16/21 1721     Attestation signed by Eric Avitia MD at 06/17/21 0131          For this patient encounter, I reviewed the NP or PA documentation, treatment  "plan, and medical decision making. Eric Avitia MD 6/17/2021 01:31 CDT                  Subjective   Patient is a 61-year-old female who presents to the ER today per EMS from [facility] with complaint of altered mental status.  Per nursing staff they got very little report from the nursing home.  EMS also got very little report from the nursing home staff.  The patient was apparently found lying on the floor sometime yesterday in her room.  She was then found laying face down in her bed today although she did have a sitter present at that time.  The patient was altered.  Unfortunately no one knows when her last known well time was.  The patient will answer \"yes \"when I say her name.  She does not answer other questions.  She is moaning in the bed and keeps saying \"oh my God, oh my God \".  The patient is unable to tell me where she is hurting it.  Nursing staff states that the patient seemed to note that her right arm was hurting.  She does have some bruising on her right hand and right wrist.  The patient also has some pain to her right hip and femur when I examined the area.  The patient has a history of end-stage renal disease and is on dialysis Tuesday, Thursdays, and Saturdays. We do not know if she had dialysis yesterday or not. The pt was recently admitted for AMS and hyponatremia from June 1 through June 8, 2021.  She was discharged home to [facility].  She presents here today for further evaluation.      History provided by:  Patient, EMS personnel and nursing home   used: No    Altered Mental Status  Presenting symptoms: behavior changes and confusion    Severity:  Moderate  Most recent episode: uknown.  Episode history:  Continuous  Timing:  Constant  Progression:  Unchanged  Context: nursing home resident and recent illness    Context: not alcohol use, not dementia, not drug use, not head injury, not homeless, taking medications as prescribed, not " recent change in medication and not recent infection    Associated symptoms: no abdominal pain, normal movement, no agitation, no bladder incontinence, no decreased appetite, no depression, no difficulty breathing, no eye deviation, no fever, no hallucinations, no headaches, no light-headedness, no nausea, no palpitations, no rash, no seizures, no slurred speech, no suicidal behavior, no visual change, no vomiting and no weakness        Review of Systems   Unable to perform ROS: Mental status change   Constitutional: Negative for decreased appetite and fever.   Cardiovascular: Negative for palpitations.   Gastrointestinal: Negative for abdominal pain, nausea and vomiting.   Genitourinary: Negative for bladder incontinence.   Skin: Negative for rash.   Neurological: Negative for seizures, weakness, light-headedness and headaches.   Psychiatric/Behavioral: Positive for confusion. Negative for agitation and hallucinations.       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    • Chronic otitis media    • Diabetes (CMS/[facility])    • End stage renal disease on dialysis (CMS/[facility])    • Eustachian tube dysfunction    • GERD (gastroesophageal reflux disease)    • Glaucoma    • HTN (hypertension)    • Hyperlipidemia    • Mucoid otitis media    • Noncompliance of patient with renal dialysis (CMS/[facility])    • Tobacco abuse        Allergies   Allergen Reactions   • Bupropion Nausea Only   • Chantix [Varenicline] Other (See Comments)     Does not remember   • Gabapentin Hallucinations     hallucinations   • Azithromycin Rash   • Levofloxacin Rash   • Penicillins Rash   • Sulfa Antibiotics Rash       Past Surgical History:   Procedure Laterality Date   • ARTERIOVENOUS FISTULA     • CATARACT EXTRACTION WITH INTRAOCULAR LENS IMPLANT     •  SECTION     • CHOLECYSTECTOMY     • MYRINGOTOMY W/ TUBES Bilateral    • MYRINGOTOMY W/ TUBES Right    • TUBAL ABDOMINAL LIGATION         Family  History   Problem Relation Age of Onset   • Heart disease Mother    • Diabetes Father    • Colon cancer Neg Hx    • Colon polyps Neg Hx        Social History     Socioeconomic History   • Marital status: Single     Spouse name: Not on file   • Number of children: Not on file   • Years of education: Not on file   • Highest education level: Not on file   Tobacco Use   • Smoking status: Current Every Day Smoker     Packs/day: 2.00     Years: 6.00     Pack years: 12.00     Types: Cigarettes   • Smokeless tobacco: Never Used   Vaping Use   • Vaping Use: Never used   Substance and Sexual Activity   • Alcohol use: No   • Drug use: No   • Sexual activity: Defer           Objective   Physical Exam  Vitals and nursing note reviewed.   Constitutional:       Appearance: She is ill-appearing.   HENT:      Head: Normocephalic.      Mouth/Throat:      Pharynx: Oropharynx is clear.   Eyes:      Conjunctiva/sclera: Conjunctivae normal.      Pupils: Pupils are equal, round, and reactive to light.   Neck:      Comments: Able to move all ext, neurovascularly intact  Cardiovascular:      Rate and Rhythm: Normal rate.   Pulmonary:      Effort: Pulmonary effort is normal.      Breath sounds: Normal breath sounds.   Abdominal:      General: Bowel sounds are normal.      Palpations: Abdomen is soft.   Musculoskeletal:      Cervical back: Normal range of motion and neck supple.      Comments: Ecchymosis noted to dorsal aspect right hand, and radial aspect right wrist.  Patient does wince in pain when I touch this area.  Radial pulse 2+, positive CMS, neurovascularly intact.    Pain to right lateral hip and right femur with palpation.  There is no erythema, ecchymosis or swelling noted.  Pedal pulses 2+, positive CMS, neurovascularly intact.    Fistula noted to left upper extremity.  Positive bruit noted.  Positive thrill.   Skin:     General: Skin is warm and dry.      Capillary Refill: Capillary refill takes less than 2 seconds.    Neurological:      General: No focal deficit present.   Psychiatric:         Mood and Affect: Mood normal.         Procedures          ED Course  ED Course as of Jun 16 2007 Wed Jun 16, 2021   1735 Pt PO2 54; placed on O2 via NC per RRT.     [LF]   1753 Pt only makes small amt of urine; in/out cath was ordered and nursing staff was only able to obtain very small amt of urine. Lab could only do a urine culture, drug screen or UA on this; order for urine culture at this time.     [LF]   1754 Pt case discussed with Dr. Avitia who agrees with plan of care.     [LF]   1840 CT head/C-spine shows no acute findings.     [LF]   1935 XR Hand 3+ View Right [LF]   1935 XR Wrist 3+ View Right [LF]   1935 XR Chest 1 View [LF]   2002 Pt case discussed with Dr. Kent; pt will be admitted to the ICU at this time in guarded condition.  Patient was hypoxic.  She is placed on O2 at 2 L via nasal cannula and her O2 sat is now 96%.  She continues to be altered.  Her troponin is elevated at 0.132 although it is always chronically elevated in the past.  She is anemic, however has been anemic in the past.     [LF]   2003 Procalcitonin is elevated.  I am concerned about sepsis.  She was given vancomycin as well as cefepime for sepsis.  I did use renal dosing.    [LF]   2006 Pt BP currently 112/52.     [LF]      ED Course User Index  [LF] Lia Bajwa, APRN                                   XR Wrist 3+ View Right   Final Result      XR Pelvis 1 or 2 View   Final Result      XR Hand 3+ View Right   Final Result      XR Femur 2 View Right   Final Result   1. No acute bony abnormality.                   This report was finalized on 06/16/2021 19:34 by Dr. Dharmesh Zuleta MD.      XR Chest 1 View   Final Result      CT Head Without Contrast   Final Result   1. No acute intracranial abnormality is seen.   2. Stable chronic change.                                                       This report was finalized on 06/16/2021 18:27 by   Dharmesh Zuleta MD.      CT Cervical Spine Without Contrast   Final Result        Labs Reviewed   COMPREHENSIVE METABOLIC PANEL - Abnormal; Notable for the following components:       Result Value    Glucose 113 (*)     BUN 25 (*)     Creatinine 4.06 (*)     Potassium 3.1 (*)     Chloride 95 (*)     Total Protein 5.9 (*)     AST (SGOT) 36 (*)     Alkaline Phosphatase 199 (*)     eGFR Non African Amer 11 (*)     BUN/Creatinine Ratio 6.2 (*)     All other components within normal limits    Narrative:     GFR Normal >60  Chronic Kidney Disease <60  Kidney Failure <15     PROTIME-INR - Abnormal; Notable for the following components:    Protime 18.9 (*)     INR 1.72 (*)     All other components within normal limits   APTT - Abnormal; Notable for the following components:    PTT 44.0 (*)     All other components within normal limits   TROPONIN (IN-HOUSE) - Abnormal; Notable for the following components:    Troponin T 0.136 (*)     All other components within normal limits    Narrative:     Troponin T Reference Range:  <= 0.03 ng/mL-   Negative for AMI  >0.03 ng/mL-     Abnormal for myocardial necrosis.  Clinicians would have to utilize clinical acumen, EKG, Troponin and serial changes to determine if it is an Acute Myocardial Infarction or myocardial injury due to an underlying chronic condition.       Results may be falsely decreased if patient taking Biotin.     LACTIC ACID, PLASMA - Abnormal; Notable for the following components:    Lactate 2.8 (*)     All other components within normal limits   PROCALCITONIN - Abnormal; Notable for the following components:    Procalcitonin 12.46 (*)     All other components within normal limits    Narrative:     As a Marker for Sepsis (Non-Neonates):     1. <0.5 ng/mL represents a low risk of severe sepsis and/or septic shock.  2. >2 ng/mL represents a high risk of severe sepsis and/or septic shock.    As a Marker for Lower Respiratory Tract Infections that require antibiotic  "therapy:  PCT on Admission     Antibiotic Therapy             6-12 Hrs later  >0.5                          Strongly Recommended            >0.25 - <0.5             Recommended  0.1 - 0.25                  Discouraged                       Remeasure/reassess PCT  <0.1                         Strongly Discouraged         Remeasure/reassess PCT      As 28 day mortality risk marker: \"Change in Procalcitonin Result\" (>80% or <=80%) if Day 0 (or Day 1) and Day 4 values are available. Refer to [email or web address] [email or web address] – [email or web address]    Change in PCT <=80 %   A decrease of PCT levels below or equal to 80% defines a positive change in PCT test result representing a higher risk for 28-day all-cause mortality of patients diagnosed with severe sepsis or septic shock.    Change in PCT >80 %   A decrease of PCT levels of more than 80% defines a negative change in PCT result representing a lower risk for 28-day all-cause mortality of patients diagnosed with severe sepsis or septic shock.               CBC WITH AUTO DIFFERENTIAL - Abnormal; Notable for the following components:    RBC 2.71 (*)     Hemoglobin 8.5 (*)     Hematocrit 27.3 (*)     .7 (*)     MCHC 31.1 (*)     RDW 17.7 (*)     RDW-SD 64.7 (*)     nRBC 1.1 (*)     All other components within normal limits   BLOOD GAS, ARTERIAL - Abnormal; Notable for the following components:    pH, Arterial 7.506 (*)     pO2, Arterial 54.0 (*)     HCO3, Arterial 28.3 (*)     Base Excess, Arterial 5.0 (*)     O2 Saturation, Arterial 91.5 (*)     pH, Temp Corrected 7.506 (*)     pO2, Temperature Corrected 54.0 (*)     All other components within normal limits   MANUAL DIFFERENTIAL - Abnormal; Notable for the following components:    Neutrophil % 87.0 (*)     Lymphocyte % 4.0 (*)     Monocyte % 3.0 (*)     Bands %  6.0 (*)     Lymphocytes Absolute 0.27 (*)     nRBC 2.0 (*)     All other components within normal limits   COVID-19,[facility] NO TRANSPORT MEDIA 2 HR " TAT - Normal    Narrative:     Fact sheet for providers: https://www.fda.gov/media/184829/download     Fact sheet for patients: https://www.fda.gov/media/422396/download    Test performed by PCR.    Consider negative results in combination with clinical observations, patient history, and epidemiological information.   CK - Normal   PHOSPHORUS - Normal   MAGNESIUM - Normal   ACETAMINOPHEN LEVEL - Normal   SALICYLATE LEVEL - Normal   BLOOD CULTURE   BLOOD CULTURE   URINE CULTURE   RAINBOW DRAW    Narrative:     The following orders were created for panel order [place] Draw.  Procedure                               Abnormality         Status                     ---------                               -----------         ------                     Green Top (Gel)[[id number]]                                  Final result               Lavender Top[[id number]]                                     Final result               Red Top[[id number]]                                          Final result                 Please view results for these tests on the individual orders.   ETHANOL    Narrative:     Not for legal purposes. Chain of Custody not followed.    BLOOD GAS, ARTERIAL   LACTIC ACID, REFLEX   GREEN TOP   LAVENDER TOP   RED TOP   CBC AND DIFFERENTIAL    Narrative:     The following orders were created for panel order CBC & Differential.  Procedure                               Abnormality         Status                     ---------                               -----------         ------                     CBC Auto Differential[[id number]]        Abnormal            Final result                 Please view results for these tests on the individual orders.             MDM  Number of Diagnoses or Management Options  Altered mental status, unspecified altered mental status type: new and requires workup  Anemia, unspecified type: new and requires workup  Elevated troponin: new and requires workup  End stage renal disease  (CMS/[facility]): new and requires workup  Fall, initial encounter: new and requires workup  Hypotension, unspecified hypotension type: new and requires workup  Hypoxia: new and requires workup     Amount and/or Complexity of Data Reviewed  Clinical lab tests: reviewed and ordered  Tests in the radiology section of CPT®: ordered and reviewed  Tests in the medicine section of CPT®: ordered and reviewed  Decide to obtain previous medical records or to obtain history from someone other than the patient: yes  Discuss the patient with other providers: yes    Risk of Complications, Morbidity, and/or Mortality  General comments: Total Critical Care Time: 45 mins    Critical Care  Total time providing critical care: 30-74 minutes    Patient Progress  Patient progress: (guarded)      Final diagnoses:   Altered mental status, unspecified altered mental status type   Hypoxia   Elevated troponin   End stage renal disease (CMS/[facility])   Anemia, unspecified type   Hypotension, unspecified hypotension type   Fall, initial encounter       ED Disposition  ED Disposition     ED Disposition Condition Comment    Decision to Admit  Level of Care: Critical Care [6]   Diagnosis: Altered mental status, unspecified altered mental status type [[id number]]   Admitting Physician: TEJINDER KENT [[id number]]   Attending Physician: TEJINDER KENT [[id number]]   Isolate for COVID?: No [0]   Certification: I Certify That Inpatient Hospital Services Are Medically Necessary For Greater Than 2 Midnights            No follow-up provider specified.       Medication List      No changes were made to your prescriptions during this visit.          Lia Bajwa, ANTHONY  06/16/21 2007      Electronically signed by Eric Avitia MD at 06/17/21 0131         Facility-Administered Medications as of 6/17/2021   Medication Dose Route Frequency Provider Last Rate Last Admin   • acetaminophen (TYLENOL) tablet 650 mg  650 mg Oral Q4H PRN Tejinder Kent MD         Or   • acetaminophen (TYLENOL) suppository 650 mg  650 mg Rectal Q4H PRN Tejinder Kent MD       • aspirin EC tablet 81 mg  81 mg Oral Daily Tejinder Kent MD       • atropine 1 % ophthalmic solution 1 drop  1 drop Left Eye Daily Tejindre Kent MD   1 drop at 06/17/21 1120   • brimonidine (ALPHAGAN) 0.2 % ophthalmic solution 1 drop  1 drop Left Eye BID PRN Tejinder Kent MD       • [COMPLETED] cefepime (MAXIPIME) 1 g/100 mL 0.9% NS IVPB (mbp)  1 g Intravenous Once Lia Bajwa APRN   Stopped at 06/16/21 1926   • cefTRIAXone (ROCEPHIN) 2 g/100 mL 0.9% NS IVPB (MBP)  2 g Intravenous Q24H Bin Guy DO       • cholecalciferol (VITAMIN D3) tablet 2,000 Units  2,000 Units Oral Daily Tejinder Kent MD       • dextrose (D50W) 25 g/ 50mL Intravenous Solution 25 g  25 g Intravenous Q15 Min PRN Tejinder Kent MD       • dextrose (GLUTOSE) oral gel 15 g  15 g Oral Q15 Min PRN Tejinder Kent MD       • epoetin vika-epbx (RETACRIT) injection 10,000 Units  10,000 Units Subcutaneous Once per day on Tue Thu Sat Tejinder Kent MD   10,000 Units at 06/17/21 1120   • famotidine (PEPCID) tablet 20 mg  20 mg Oral Daily Tejinder Kent MD       • Ferric Citrate tablet 840 mg  4 tablet Oral TID With Meals Tejinder Kent MD       • fluticasone (FLONASE) 50 MCG/ACT nasal spray 2 spray  2 spray Nasal BID Tejinder Kent MD   2 spray at 06/17/21 1120   • glucagon (human recombinant) (GLUCAGEN DIAGNOSTIC) injection 1 mg  1 mg Subcutaneous Q15 Min PRN Tejinder Kent MD       • insulin lispro (humaLOG) injection 2-7 Units  2-7 Units Subcutaneous TID AC Tejinder Kent MD       • ipratropium-albuterol (DUO-NEB) nebulizer solution 3 mL  3 mL Nebulization Q4H PRN Tejinder Kent MD       • [COMPLETED] lactated ringers bolus 500 mL  500 mL Intravenous Once Tejinder Kent MD   Stopped at 06/17/21 0308   • lactated ringers infusion  50 mL/hr Intravenous Continuous Tejinder Kent,  MD 50 mL/hr at 06/17/21 0308 50 mL/hr at 06/17/21 0308   • lactobacillus acidophilus (RISAQUAD) capsule 1 capsule  1 capsule Oral Daily Tejinder Kent MD       • ondansetron (ZOFRAN) tablet 4 mg  4 mg Oral Q6H PRN Tejinder Kent MD        Or   • ondansetron (ZOFRAN) injection 4 mg  4 mg Intravenous Q6H PRN Tejinder Kent MD       • [COMPLETED] potassium chloride 20 mEq in 100 mL IVPB  20 mEq Intravenous Q2H Tejinder Kent MD 50 mL/hr at 06/17/21 0633 20 mEq at 06/17/21 0633   • pravastatin (PRAVACHOL) tablet 10 mg  10 mg Oral Nightly Tejinder Kent MD       • sertraline (ZOLOFT) tablet 25 mg  25 mg Oral Daily Tejinder Kent MD       • sodium chloride 0.9 % flush 10 mL  10 mL Intravenous PRN Tejinder Kent MD       • sodium chloride 0.9 % flush 10 mL  10 mL Intravenous Q12H Tejinder Kent MD   10 mL at 06/17/21 1119   • sodium chloride 0.9 % flush 10 mL  10 mL Intravenous PRN Tejinder Kent MD       • timolol (TIMOPTIC) 0.5 % ophthalmic solution 1 drop  1 drop Left Eye Daily Tejinder Kent MD   1 drop at 06/17/21 1120   • [COMPLETED] vancomycin (VANCOCIN) in iso-osmotic dextrose IVPB 1 g (premix) 200 mL  1,000 mg Intravenous Once Lia Bajwa APRN   Stopped at 06/16/21 2043   • vancomycin oral solution reconstituted 125 mg  125 mg Oral Q6H Bin Guy DO         Orders (last 72 hrs)      Start     Ordered    06/18/21 2349  Auto Discontinue in 48 Hours if not Collected  ONCE CDIFF      06/16/21 2349    06/18/21 2349  Auto Discontinue GI Panel in 48 Hours if not Collected  ONCE GI PANEL      06/16/21 2349    06/18/21 2000  vancomycin 750 mg/250 mL 0.9% NS IVPB ([facility])  Every 48 Hours,   Status:  Discontinued      06/17/21 0454    06/18/21 0600  CBC & Differential  Morning Draw      06/17/21 0944    06/18/21 0600  Basic Metabolic Panel  Morning Draw      06/17/21 0944    06/18/21 0600  Magnesium  Morning Draw      06/17/21 0944    06/17/21 2100  pravastatin (PRAVACHOL)  tablet 10 mg  Nightly      06/17/21 0355    06/17/21 1800  cefepime (MAXIPIME) 2 g/100 mL 0.9% NS (mbp)  Every 24 Hours,   Status:  Discontinued      06/17/21 0454    06/17/21 1315  cefTRIAXone (ROCEPHIN) 2 g/100 mL 0.9% NS IVPB (MBP)  Every 24 Hours      06/17/21 1228    06/17/21 1200  vancomycin oral solution reconstituted 125 mg  Every 6 Hours Scheduled      06/17/21 0838    06/17/21 1130  POC Glucose Once  Once      06/17/21 1118    06/17/21 0900  aspirin EC tablet 81 mg  Daily      06/17/21 0355    06/17/21 0900  atropine 1 % ophthalmic solution 1 drop  Daily      06/17/21 0355    06/17/21 0900  cholecalciferol (VITAMIN D3) tablet 2,000 Units  Daily      06/17/21 0355    06/17/21 0900  epoetin vika-epbx (RETACRIT) injection 10,000 Units  Once per day on Tue Thu Sat      06/17/21 0355    06/17/21 0900  famotidine (PEPCID) tablet 20 mg  Daily      06/17/21 0355    06/17/21 0900  fluticasone (FLONASE) 50 MCG/ACT nasal spray 2 spray  2 Times Daily      06/17/21 0355    06/17/21 0900  lactobacillus acidophilus (RISAQUAD) capsule 1 capsule  Daily      06/17/21 0355    06/17/21 0900  sertraline (ZOLOFT) tablet 25 mg  Daily      06/17/21 0355    06/17/21 0900  timolol (TIMOPTIC) 0.5 % ophthalmic solution 1 drop  Daily      06/17/21 0355    06/17/21 0900  sodium chloride 0.9 % flush 10 mL  Every 12 Hours Scheduled      06/17/21 0355    06/17/21 0839  SLP Consult: Eval & Treat RN Dysphagia Screen Failed  Once      06/17/21 0838    06/17/21 0837  Patient Isolation Contact Spore  Continuous     Comments: Isolation Precautions Per Clostridium Difficile (CDI) Infection Control Policy / Process    06/17/21 0838    06/17/21 0831  Troponin  Once      06/17/21 0832    06/17/21 0831  Lactic Acid, Plasma  Once      06/17/21 0832    06/17/21 0800  Ferric Citrate tablet 840 mg  3 Times Daily With Meals      06/17/21 0355    06/17/21 0730  insulin lispro (humaLOG) injection 2-7 Units  3 Times Daily Before Meals      06/17/21 2995     06/17/21 0702  Inpatient Nephrology Consult  IN AM     Specialty:  Nephrology  Provider:  (Not yet assigned)    06/17/21 0355    06/17/21 0700  POC Glucose 4x Daily AC & at Bedtime  4 Times Daily Before Meals & at Bedtime     Comments: If bedtime blood glucose is greater than 350 mg/dl, call MD.      06/17/21 0355    06/17/21 0654  POC Glucose Once  Once      06/17/21 0641    06/17/21 0630  Manual Differential  Once      06/17/21 0629    06/17/21 0600  Comprehensive Metabolic Panel  Morning Draw      06/17/21 0355    06/17/21 0600  Magnesium  Morning Draw      06/17/21 0355    06/17/21 0600  Phosphorus  Morning Draw      06/17/21 0355    06/17/21 0600  CBC Auto Differential  Morning Draw      06/17/21 0355    06/17/21 0415  potassium chloride 20 mEq in 100 mL IVPB  Every 2 Hours      06/17/21 0355    06/17/21 0400  Vital Signs Every Hour and Per Hospital Policy Based on Patient Condition  Every Hour      06/17/21 0355    06/17/21 0400  Intake and Output  Every Hour      06/17/21 0355    06/17/21 0400  Neuro Checks  Every 4 Hours      06/17/21 0355    06/17/21 0356  Do NOT Hold Basal or Correction Scale Insulin When Patient is NPO, Hold Scheduled Mealtime (Bolus) Insulin if NPO  Continuous      06/17/21 0355    06/17/21 0356  Follow [facility] Hypoglycemia Standing Orders For Blood Glucose Less Than 70 mg/dL  Until Discontinued     Comments: ALERT PATIENT - NOT NPO & CAN SAFELY SWALLOW  Administer 4 oz Fruit Juice OR 4 oz Regular Soda OR 8 oz Milk OR 15-30 grams (1 tube) of Glucose Gel.  Recheck Blood Glucose Approximately 15 Minutes After Ingestion, Repeat Treatment & Continue to Recheck Blood Sugar Approximately Every 15 Minutes Until Blood Glucose is 70 or Higher.  Once Blood Glucose is 70 or Higher & if It Will Be More Than 60 Minutes Until Next Meal, Provide Appropriate Snack (Including Carbohydrate Food) Based on Meal Plan Order. Give Meal Tray As Soon As Possible.    PATIENT HAS IV ACCESS - UNRESPONSIVE, NPO OR  UNABLE TO SAFELY SWALLOW  Administer 25g (50ml) D50W IV Push.  Recheck Blood Glucose Approximately 15 Minutes After Administration, if Blood Glucose Remains Less Than 70, Repeat Treatment   Recheck Blood Glucose Approximately 15 Minutes After 2nd Administration, if Blood Glucose Remains Less Than 70 After 2nd Dose of D50W, Contact Provider for Further Treatment Orders & Consider Adding IVF With D5W for Maintenance    PATIENT WITHOUT IV ACCESS - UNRESPONSIVE, NPO OR UNABLE TO SAFELY SWALLOW  Administer 1mg Glucagon SQ & Establish IV Access.  Turn Patient on Side - Nausea / Vomiting May Occur.  Recheck Blood Glucose Approximately 15 Minutes After Administration.  If Blood Glucose Remains Less Than 70, Administer 25g D50W IV Push (50ml).  Recheck Blood Glucose Approximately 15 Minutes After Administration of D50W, if Blood Glucose Remains Less Than 70, Contact Provider for Further Treatment Orders & Consider Adding IVF With D5 for Maintenance    Document Event & Patient Response to Interventions in EMR, Document Medications on MAR  Notify Provider if Hypoglycemia Treatment Needed    06/17/21 0355    06/17/21 0356  Cardiac Monitoring  Continuous      06/17/21 0355    06/17/21 0356  Continuous Pulse Oximetry  Continuous      06/17/21 0355    06/17/21 0356  Height & Weight  Once      06/17/21 0355    06/17/21 0356  Daily Weights  Daily      06/17/21 0355    06/17/21 0356  Use Mobility Guidelines for Advancement of Activity  Continuous      06/17/21 0355    06/17/21 0356  Insert Peripheral IV  Once      06/17/21 0355    06/17/21 0356  Saline Lock & Maintain IV Access  Continuous      06/17/21 0355    06/17/21 0356  Place Sequential Compression Device  Once      06/17/21 0355    06/17/21 0356  Maintain Sequential Compression Device  Continuous      06/17/21 0355    06/17/21 0356  NPO Diet  Diet Effective Now      06/17/21 0355    06/17/21 0356  TSH  STAT      06/17/21 0355    06/17/21 0356  T4, Free  STAT      06/17/21 0355     06/17/21 0356  Urine Drug Screen - Urine, Clean Catch  Once      06/17/21 0355    06/17/21 0355  sodium chloride 0.9 % flush 10 mL  As Needed      06/17/21 0355    06/17/21 0355  acetaminophen (TYLENOL) tablet 650 mg  Every 4 Hours PRN      06/17/21 0355    06/17/21 0355  acetaminophen (TYLENOL) suppository 650 mg  Every 4 Hours PRN      06/17/21 0355    06/17/21 0355  ondansetron (ZOFRAN) tablet 4 mg  Every 6 Hours PRN      06/17/21 0355    06/17/21 0355  ondansetron (ZOFRAN) injection 4 mg  Every 6 Hours PRN      06/17/21 0355    06/17/21 0355  albuterol sulfate HFA (PROVENTIL HFA;VENTOLIN HFA;PROAIR HFA) inhaler 2 puff  Every 6 Hours PRN,   Status:  Discontinued      06/17/21 0355    06/17/21 0355  brimonidine (ALPHAGAN) 0.2 % ophthalmic solution 1 drop  2 Times Daily PRN      06/17/21 0355    06/17/21 0355  ipratropium-albuterol (DUO-NEB) nebulizer solution 3 mL  Every 4 Hours PRN      06/17/21 0355    06/17/21 0355  dextrose (GLUTOSE) oral gel 15 g  Every 15 Minutes PRN      06/17/21 0355    06/17/21 0355  dextrose (D50W) 25 g/ 50mL Intravenous Solution 25 g  Every 15 Minutes PRN      06/17/21 0355    06/17/21 0355  glucagon (human recombinant) (GLUCAGEN DIAGNOSTIC) injection 1 mg  Every 15 Minutes PRN      06/17/21 0355    06/17/21 0355  Pharmacy to dose vancomycin  Continuous PRN,   Status:  Discontinued      06/17/21 0355    06/17/21 0355  Pharmacy Consult - Pharmacy to dose  Continuous PRN,   Status:  Discontinued      06/17/21 0355 06/17/21 0234  lactated ringers infusion  Continuous      06/17/21 0232    06/17/21 0216  Code Status and Medical Interventions:  Continuous      06/17/21 0220    06/16/21 2351  lactated ringers bolus 500 mL  Once      06/16/21 2349    06/16/21 2349  Clostridium Difficile Toxin - Stool, Per Rectum  Once      06/16/21 2349    06/16/21 2349  Patient Isolation Contact Spore  Continuous,   Status:  Canceled     Comments: Isolation Precautions Per Clostridium Difficile (CDI)  Infection Control Policy / Process    06/16/21 2349    06/16/21 2349  Gastrointestinal Panel, PCR - Stool, Per Rectum  Once      06/16/21 2349    06/16/21 2349  Clostridium Difficile Toxin, PCR - Stool, Per Rectum  PROCEDURE ONCE      06/16/21 2349    06/16/21 2049  Timed Lactic Acid, Reflex  PROCEDURE ONCE      06/16/21 1818    06/16/21 2007  Inpatient Admission  Once      06/16/21 2006 06/16/21 1835  Manual Differential  Once      06/16/21 1834    06/16/21 1827  Manual Differential  Once,   Status:  Canceled      06/16/21 1826    06/16/21 1800  cefepime (MAXIPIME) 1 g/100 mL 0.9% NS IVPB (mbp)  Once      06/16/21 1753    06/16/21 1800  vancomycin (VANCOCIN) in iso-osmotic dextrose IVPB 1 g (premix) 200 mL  Once      06/16/21 1753    06/16/21 1755  Urine Culture - Urine, Urine, Catheter  Once      06/16/21 1754    06/16/21 1739  COVID-19,[facility] IN-HOUSE,Nasal Swab No Transport Media 3-4 HR TAT - Swab, Nasal Cavity  Once      06/16/21 1738    06/16/21 1724  Blood Gas, Arterial -  Once      06/16/21 1723    06/16/21 1723  CBC Auto Differential  Once      06/16/21 1722    06/16/21 1716  XR Femur 2 View Right  1 Time Imaging      06/16/21 1716    06/16/21 1716  Acetaminophen Level  Once      06/16/21 1716    06/16/21 1716  Salicylate Level  Once      06/16/21 1716    06/16/21 1716  Cardiac Monitoring  Once,   Status:  Canceled      06/16/21 1716    06/16/21 1716  Pulse Oximetry, Continuous  Continuous,   Status:  Canceled      06/16/21 1716    06/16/21 1716  NPO Diet  Diet Effective Now,   Status:  Canceled      06/16/21 1716    06/16/21 1716  Insert peripheral IV  Once      06/16/21 1716    06/16/21 1716  Blood Gas, Arterial -  Once      06/16/21 1716    06/16/21 1715  sodium chloride 0.9 % flush 10 mL  As Needed      06/16/21 1716    06/16/21 1715  XR Chest 1 View  1 Time Imaging      06/16/21 1716    06/16/21 1715  XR Hand 3+ View Right  1 Time Imaging      06/16/21 1716    06/16/21 1715  XR Wrist 3+ View Right   1 Time Imaging      06/16/21 1716    06/16/21 1715  XR Pelvis 1 or 2 View  1 Time Imaging      06/16/21 1716    06/16/21 1714  Magnesium  STAT      06/16/21 1716    06/16/21 1714  ECG 12 Lead  Once      06/16/21 1716    06/16/21 1714  Apply collar  Once      06/16/21 1716    06/16/21 1714  CT Head Without Contrast  1 Time Imaging      06/16/21 1716    06/16/21 1714  CT Cervical Spine Without Contrast  1 Time Imaging      06/16/21 1716    06/16/21 1713  Blood Culture - Blood, Arm, Right  Once      06/16/21 1716    06/16/21 1713  Blood Culture - Blood, Arm, Right  Once      06/16/21 1716    06/16/21 1713  Lactic Acid, Plasma  Once      06/16/21 1716    06/16/21 1713  Procalcitonin  Once      06/16/21 1716    06/16/21 1713  Ethanol  Once      06/16/21 1716    06/16/21 1713  Urine Drug Screen - Urine, Catheter  Once,   Status:  Canceled      06/16/21 1716    06/16/21 1713  CK  Once      06/16/21 1716    06/16/21 1713  Phosphorus  Once      06/16/21 1716    06/16/21 1712  Protime-INR  Once      06/16/21 1716    06/16/21 1712  aPTT  Once      06/16/21 1716    06/16/21 1712  Urinalysis With Culture If Indicated - Urine, Catheter  Once,   Status:  Canceled      06/16/21 1716    06/16/21 1712  Troponin  Once      06/16/21 1716    06/16/21 1711  CBC & Differential  Once      06/16/21 1716    06/16/21 1711  Comprehensive Metabolic Panel  Once      06/16/21 1716    06/16/21 1704  [place] Draw  STAT      06/16/21 1703    06/16/21 1704  Green Top (Gel)  PROCEDURE ONCE      06/16/21 1703    06/16/21 1704  Lavender Top  PROCEDURE ONCE      06/16/21 1703    06/16/21 1704  Red Top  PROCEDURE ONCE      06/16/21 1703    Pending  Inpatient Case Management  Consult  Once     Provider:  (Not yet assigned)    Pending    --  SCANNED - TELEMETRY        06/16/21 0000                   Physician Progress Notes (last 48 hours) (Notes from 06/15/21 1248 through 06/17/21 1248)      Bin Guy DO at 06/17/21 0839               [facility] Medicine Services  INPATIENT PROGRESS NOTE    Patient Name: Mini Gentile  Date of Admission: 6/16/2021  Today's Date: 06/17/21  Length of Stay: 1  Primary Care Physician: Bharati Dahl APRN    Subjective   Chief Complaint: AMS/cdiff    HPI   Patient seen in CCU 8.  She admitted overnight from nursing home for her diarrhea and altered mental status.  Hypotensive.  She is arousable this morning but only will stay awake for a few seconds at a time.  She will answer questions.  She denies chest pain.  Denies shortness of breath.  Does substantiate that she been having bouts of diarrhea for at least several days nursing home but denies any overt abdominal pain.  She was recently here and treated for aspiration pneumonia with antibiotics.  Her C. difficile has come back positive.  She is afebrile.  Blood pressure soft still.        Review of Systems   All pertinent negatives and positives are as above. All other systems have been reviewed and are negative unless otherwise stated.     Objective    Temp:  [95.5 °F (35.3 °C)-100.2 °F (37.9 °C)] 95.5 °F (35.3 °C)  Heart Rate:  [67-97] 67  Resp:  [14-22] 20  BP: ()/() 98/56  Physical Exam  GEN: somnolent but easily awoken, interactive but will fall asleep frequently, NAD  HEENT: EOMI, Anicteric, Trachea midline  Lungs: no wheezing/rales/rhonchi  Heart: RRR, +S1/s2, no rub  ABD: soft, possible mild distension but +BS and no guarding/rebound  Extremities:  no cyanosis, no edema  Skin: no rashes or petechiae   Neuro: AAOx1-2, MONSALVE  Psych: difficult to assess at this time        Results Review:  I have reviewed the labs, radiology results, and diagnostic studies.    Laboratory Data:   Results from last 7 days   Lab Units 06/17/21  0609 06/16/21  1706 06/16/21  1245   WBC 10*3/mm3 8.52 6.65 5.45   HEMOGLOBIN g/dL 9.9* 8.5* 8.9*   HEMATOCRIT % 32.3* 27.3* 28.0*   PLATELETS 10*3/mm3 130* 145 156        Results from  last 7 days   Lab Units 06/17/21  0609 06/16/21  1706 06/16/21  1245   SODIUM mmol/L 135* 137 137   POTASSIUM mmol/L 3.8 3.1* 3.1*   CHLORIDE mmol/L 94* 95* 94*   CO2 mmol/L 28.0 28.0 28.0   BUN mg/dL 30* 25* 25*   CREATININE mg/dL 4.26* 4.06* 3.68*   CALCIUM mg/dL 9.2 9.1 9.0   BILIRUBIN mg/dL 0.8 0.6 0.6   ALK PHOS U/L 200* 199* 222*   ALT (SGPT) U/L 18 19 20   AST (SGOT) U/L 29 36* 36*   GLUCOSE mg/dL 97 113* 150*       Culture Data:   No results found for: BLOODCX, URINECX, WOUNDCX, MRSACX, RESPCX, STOOLCX    Radiology Data:   Imaging Results (Last 24 Hours)     Procedure Component Value Units Date/Time    XR Femur 2 View Right [[id number]] Collected: 06/16/21 1933     Updated: 06/16/21 1937    Narrative:      EXAMINATION: XR FEMUR 2 VW RIGHT-     6/16/2021 6:40 PM CDT     HISTORY: rt leg pain, fall     Right femur, 4 views.     No femur fracture is seen.     The hip and knee are normal, to the extent visualized.     No soft tissue abnormality is seen.       Impression:      1. No acute bony abnormality.              This report was finalized on 06/16/2021 19:34 by Dr. Dharmesh Zuleta MD.    XR Pelvis 1 or 2 View [[id number]] Collected: 06/16/21 1931     Updated: 06/16/21 1936    Narrative:      EXAMINATION: XR PELVIS 1 OR 2 VW-     6/16/2021 6:40 PM CDT     HISTORY: fall, pain     Pelvis, one view.     Mildly displaced superior and inferior right pubic ramus fractures are  not acute and are seen on a CT exam from 6/1/2021.     . SI joints are symmetric.     No sacral fracture is seen.     No hip fracture or dislocation.     Summary:  1. Old fractures of the right superior and inferior pubic ramus.  2. No acute fracture is seen.  This report was finalized on 06/16/2021 19:33 by Dr. Dharmesh Zuleta MD.    XR Hand 3+ View Right [[id number]] Collected: 06/16/21 1930     Updated: 06/16/21 1934    Narrative:      EXAMINATION: XR HAND 3+ VW RIGHT-     6/16/2021 6:40 PM CDT     HISTORY: Fall injury. Right hand pain.     Right  hand, 3 views.     Osteopenia.     Chronic appearing small soft tissue calcifications are seen adjacent to  the third and fourth MCP joints.     Metacarpals and fingers show no sign of acute fracture.     The distal aspect of the second finger and third finger are obscured by  a Sensor and wire on some of the images.     Carpal bones align normally.     Summary:  1. No acute fracture is seen.     This report was finalized on 06/16/2021 19:31 by Dr. Dharmesh Zuleta MD.    XR Wrist 3+ View Right [[id number]] Collected: 06/16/21 1929     Updated: 06/16/21 1932    Narrative:      EXAMINATION: XR WRIST 3+ VW RIGHT-     6/16/2021 6:40 PM CDT     HISTORY: Fall injury. Right wrist pain.     Right wrist, 3 views.     Osteopenia.     Diffuse arterial calcification.     Intact distal radius and ulna.     Carpal bones align normally.     Proximal metacarpals are intact.     Summary:  1. No acute fracture.     This report was finalized on 06/16/2021 19:29 by Dr. Dharmesh Zuleta MD.    XR Chest 1 View [[id number]] Collected: 06/16/21 1927     Updated: 06/16/21 1932    Narrative:      EXAMINATION: XR CHEST 1 VW-     6/16/2021 6:40 PM CDT     HISTORY: fever, AMS     1 view chest x-ray compared with 6/7/2021.     Stable heart and mediastinum.  Aortic arch calcification.     Interval removal of a right jugular central line.     There is increased interstitial prominence compatible with interstitial  edema and early heart failure.     There is an unchanged appearance of a moderate right pleural effusion.     Nonhealed fracture of the right humeral neck.  This finding was demonstrated on a radiograph from 4/1/2021.     Summary:  1. Increased interstitial prominence compatible with interstitial edema  and early failure.  2. Unchanged or minimally decreased moderate right pleural effusion.  This report was finalized on 06/16/2021 19:28 by Dr. Dharmesh Zuleta MD.    CT Cervical Spine Without Contrast [[id number]] Collected: 06/16/21 1827      Updated: 06/16/21 1832    Narrative:      EXAMINATION: CT CERVICAL SPINE WO CONTRAST-      6/16/2021 6:13 PM CDT     HISTORY: Fall injury. Neck pain. Altered mental status.     In order to have a CT radiation dose as low as reasonably achievable  Automated Exposure Control was utilized for adjustment of the mA and/or  KV according to patient size.     DLP in mGycm= 593.     Noncontrast cervical spine CT.     Comparison is made with cervical spine CT imaging from 3/26/2021.     The patient was unable to cooperate and the images are affected by  motion.     Mild right convex curvature on the coronal sequence is negative and  probably is positional.     The lateral view shows appropriate lordotic curvature with no vertebral  body malalignment.     Prevertebral soft tissues are normal.     Facet joints align normally.     Summary:  1. No acute fracture is seen.                                   This report was finalized on 06/16/2021 18:29 by Dr. Dharmesh Zuleta MD.    CT Head Without Contrast [[id number]] Collected: 06/16/21 1826     Updated: 06/16/21 1830    Narrative:      EXAMINATION: CT HEAD WO CONTRAST-      6/16/2021 6:13 PM CDT     HISTORY: Altered mental status.     In order to have a CT radiation dose as low as reasonably achievable  Automated Exposure Control was utilized for adjustment of the mA and/or  KV according to patient size.     DLP in mGycm= 594.     Noncontrast head CT.     Comparison is made with a noncontrast head CT from 6/1/2021.     Axial, sagittal, and coronal noncontrast CT imaging of the head.     The visualized paranasal sinuses are clear.     The brain and ventricles have an age appropriate appearance.   There is no hemorrhage or mass-effect.   No acute infarction is seen.     No calvarial abnormality.       Impression:      1. No acute intracranial abnormality is seen.  2. Stable chronic change.                                         This report was finalized on 06/16/2021 18:27 by   Dharmesh Zuleta MD.          I have reviewed the patient's current medications.     Assessment/Plan     Active Hospital Problems    Diagnosis    • **C. difficile diarrhea    • Diarrhea    • Hypokalemia    • Altered mental status    • End stage renal failure on dialysis (CMS/[facility])    • Diabetes (CMS/[facility])    • HTN (hypertension)        #1 C. difficile diarrhea -in the setting of recent antibiotics for aspiration pneumonia.  Appears to be more mild case.  Blood pressures are somewhat soft but I feel like that was an issue last hospital stay as well.  She does not have a fever or high white count.  Her belly is not overtly distended or tender.  Will DC IV vancomycin and cefepime.  Start p.o. vancomycin 125 every 6.  If patient unable to pass swallow eval will need to drop an NGT and start Vanco that way.    #2 altered mental status -patient also presented with all mental status last time she had an infection.  It took her several days to return to baseline.  Her head CT was nonacute.  She will extremities.  Treat underlying C. difficile and plan for dialysis.  Monitor closely.  No obvious focal deficits.    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  Left to look further back.  At this time regardless hold meds.  Blood pressure stable around 100 systolic.    #4 hypokalemia -resolved.  Likely in setting of diarrhea.    #5 ESRD -nephrology consulted.  Today is a normal dialysis day.  HD per them.    #6 DM2 -sliding scale insulin and hypoglycemia protocol.      Discharge Planning: Ongoing.  Blood pressure still soft.  Plan for dialysis today.  Monitor in ICU at this time given blood pressures.  Will reevaluate later.  Will likely need to go back to nursing facility at discharge.    Electronically signed by Bin Guy DO, 06/17/21, 08:39 CDT.      Electronically signed by Bin Guy DO at 06/17/21 5073          Consult Notes (last 48  hours) (Notes from 06/15/21 1248 through 06/17/21 1248)      Gomez Lemos APRN at 06/17/21 1003      Consult Orders    1. Inpatient Nephrology Consult [[id number]] ordered by Tejinder Kent MD at 06/17/21 0220               Nephrology ([facility]) Consult Note      Patient:  Mini Gentile  YOB: 1960  Date of Service: 6/17/2021  MRN: [id number]   Acct: [id number]   Primary Care Physician: Bharati Dahl APRN  Advance Directive:   Code Status and Medical Interventions:   Ordered at: 06/17/21 0220     Code Status:    CPR     Medical Interventions (Level of Support Prior to Arrest):    Full     Admit Date: 6/16/2021       Hospital Day: 1  Referring Provider: Tejinder Kent MD      Patient personally seen and examined.  Complete chart including Consults, Notes, Operative Reports, Labs, Cardiology, and Radiology studies reviewed as able.        Subjective:  Mini Gentile is a 61 y.o. female  whom we were consulted for end stage renal disease. Maintenance hemodialysis on Tuesday Thursday Saturday at [facility]. Fairly noncompliant with dialysis treatments. She was just discharged from [facility] on 6/08. Unknown if she has been going to HD treatments since then.  Presented to ER after she was noted to be confused at nursing home.  Patient initially minimally responsive but is somewhat more interactive today. Still lethargic but will wake and answer some questions. Complaints of diarrhea recently, and stool has tested positive for C Diff.    Allergies:  Bupropion, Chantix [varenicline], Gabapentin, Azithromycin, Levofloxacin, Penicillins, and Sulfa antibiotics    Home Meds:  Medications Prior to Admission   Medication Sig Dispense Refill Last Dose   • albuterol sulfate  (90 Base) MCG/ACT inhaler Inhale 2 puffs Every 4 (Four) Hours As Needed for Wheezing. (Patient taking differently: Inhale 2 puffs Every 6 (Six) Hours As Needed for Wheezing.)  18 g 0    • aspirin 81 MG EC tablet Take 81 mg by mouth Daily.      • atropine 1 % ophthalmic solution Administer 1 drop into the left eye Daily.      • brimonidine (ALPHAGAN) 0.2 % ophthalmic solution Administer 1 drop into the left eye 2 (Two) Times a Day As Needed.      • carvedilol (COREG) 6.25 MG tablet Take 6.25 mg by mouth 2 (Two) Times a Day With Meals.      • cholecalciferol (VITAMIN D3) 1000 units tablet Take 2,000 Units by mouth Daily.      • dorzolamide (TRUSOPT) 2 % ophthalmic solution Administer 1 drop into the left eye 2 (Two) Times a Day.      • epoetin vika-epbx (RETACRIT) 52519 UNIT/ML injection Inject 1 mL under the skin into the appropriate area as directed 3 (Three) Times a Week. Indications: ESRD on Dialysis 6.6 mL     • famotidine (PEPCID) 20 MG tablet Take 1 tablet by mouth Daily.      • Ferric Citrate 1  MG(Fe) tablet Take 4 tablets by mouth 3 (Three) Times a Day With Meals. 270 tablet     • fluticasone (FLONASE) 50 MCG/ACT nasal spray 2 sprays into the nostril(s) as directed by provider 2 (Two) Times a Day.      • insulin lispro (humaLOG) 100 UNIT/ML injection Inject 2-7 Units under the skin into the appropriate area as directed 3 (Three) Times a Day Before Meals.  12    • ipratropium-albuterol (DUO-NEB) 0.5-2.5 mg/3 ml nebulizer Take 3 mL by nebulization Every 4 (Four) Hours As Needed for Wheezing or Shortness of Air.      • lactobacillus acidophilus (RISAQUAD) capsule capsule Take 1 capsule by mouth Daily. 30 capsule 2    • metoclopramide (REGLAN) 5 MG tablet Take 1 tablet by mouth 4 (Four) Times a Day Before Meals & at Bedtime.      • ondansetron (ZOFRAN) 4 MG tablet Take 1 tablet by mouth Every 6 (Six) Hours As Needed for Nausea or Vomiting. 24 tablet 2    • pravastatin (PRAVACHOL) 10 MG tablet Take 10 mg by mouth Every Night.      • sertraline (ZOLOFT) 25 MG tablet Take 1 tablet by mouth Daily.      • timolol (TIMOPTIC) 0.5 % ophthalmic solution Administer 1 drop into the left  eye Every Morning.  12        Medicines:  Current Facility-Administered Medications   Medication Dose Route Frequency Provider Last Rate Last Admin   • acetaminophen (TYLENOL) tablet 650 mg  650 mg Oral Q4H PRN Tejinder Kent MD        Or   • acetaminophen (TYLENOL) suppository 650 mg  650 mg Rectal Q4H PRN Tejinder Kent MD       • aspirin EC tablet 81 mg  81 mg Oral Daily Tejinder Kent MD       • atropine 1 % ophthalmic solution 1 drop  1 drop Left Eye Daily Tejinder Kent MD       • brimonidine (ALPHAGAN) 0.2 % ophthalmic solution 1 drop  1 drop Left Eye BID PRN Tejinder Kent MD       • cholecalciferol (VITAMIN D3) tablet 2,000 Units  2,000 Units Oral Daily Tejinder Kent MD       • dextrose (D50W) 25 g/ 50mL Intravenous Solution 25 g  25 g Intravenous Q15 Min PRN Tejinder Kent MD       • dextrose (GLUTOSE) oral gel 15 g  15 g Oral Q15 Min PRN Tejinder Kent MD       • epoetin vika-epbx (RETACRIT) injection 10,000 Units  10,000 Units Subcutaneous Once per day on Tue Thu Sat Tejinder Kent MD       • famotidine (PEPCID) tablet 20 mg  20 mg Oral Daily Tejinder Kent MD       • Ferric Citrate tablet 840 mg  4 tablet Oral TID With Meals Tejinder Kent MD       • fluticasone (FLONASE) 50 MCG/ACT nasal spray 2 spray  2 spray Nasal BID Tejinder Kent MD       • glucagon (human recombinant) (GLUCAGEN DIAGNOSTIC) injection 1 mg  1 mg Subcutaneous Q15 Min PRN Tejinder Kent MD       • insulin lispro (humaLOG) injection 2-7 Units  2-7 Units Subcutaneous TID AC Tejinder Kent MD       • ipratropium-albuterol (DUO-NEB) nebulizer solution 3 mL  3 mL Nebulization Q4H PRN Tejinder Kent MD       • lactated ringers infusion  50 mL/hr Intravenous Continuous Tejinder Kent MD 50 mL/hr at 06/17/21 0308 50 mL/hr at 06/17/21 0308   • lactobacillus acidophilus (RISAQUAD) capsule 1 capsule  1 capsule Oral Daily Tejinder Kent MD       • ondansetron (ZOFRAN) tablet 4  mg  4 mg Oral Q6H PRN Tejinder Kent MD        Or   • ondansetron (ZOFRAN) injection 4 mg  4 mg Intravenous Q6H PRN Tejinder Kent MD       • pravastatin (PRAVACHOL) tablet 10 mg  10 mg Oral Nightly Tejinder Kent MD       • sertraline (ZOLOFT) tablet 25 mg  25 mg Oral Daily Tejinder Kent MD       • sodium chloride 0.9 % flush 10 mL  10 mL Intravenous PRN Tejinder Kent MD       • sodium chloride 0.9 % flush 10 mL  10 mL Intravenous Q12H Tejinder Kent MD       • sodium chloride 0.9 % flush 10 mL  10 mL Intravenous PRN Tejinder Kent MD       • timolol (TIMOPTIC) 0.5 % ophthalmic solution 1 drop  1 drop Left Eye Daily Tejinder Kent MD       • vancomycin oral solution reconstituted 125 mg  125 mg Oral Q6H Bin Guy DO           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    • Chronic otitis media    • Diabetes (CMS/[facility])    • End stage renal disease on dialysis (CMS/[facility])    • Eustachian tube dysfunction    • GERD (gastroesophageal reflux disease)    • Glaucoma    • HTN (hypertension)    • Hyperlipidemia    • Mucoid otitis media    • Noncompliance of patient with renal dialysis (CMS/[facility])    • Tobacco abuse        Past Surgical History:  Past Surgical History:   Procedure Laterality Date   • ARTERIOVENOUS FISTULA     • CATARACT EXTRACTION WITH INTRAOCULAR LENS IMPLANT     •  SECTION     • CHOLECYSTECTOMY     • MYRINGOTOMY W/ TUBES Bilateral    • MYRINGOTOMY W/ TUBES Right    • TUBAL ABDOMINAL LIGATION         Family History  Family History   Problem Relation Age of Onset   • Heart disease Mother    • Diabetes Father    • Colon cancer Neg Hx    • Colon polyps Neg Hx        Social History  Social History     Socioeconomic History   • Marital status: Single     Spouse name: Not on file   • Number of children: Not on file   • Years of education: Not on file   • Highest education level: Not on file    Tobacco Use   • Smoking status: Current Every Day Smoker     Packs/day: 2.00     Years: 6.00     Pack years: 12.00     Types: Cigarettes   • Smokeless tobacco: Never Used   Vaping Use   • Vaping Use: Never used   Substance and Sexual Activity   • Alcohol use: No   • Drug use: No   • Sexual activity: Defer         Review of Systems:  History obtained from chart review and the patient  General ROS: No fever or chills  Respiratory ROS: No cough, shortness of breath, wheezing  Cardiovascular ROS: No chest pain or palpitations  Gastrointestinal ROS: positive for - diarrhea  No abdominal pain or melena  Genito-Urinary ROS: No dysuria or hematuria  14 point ROS reviewed with the patient and negative except as noted above and in the HPI unless unable to obtain.    Objective:  Patient Vitals for the past 24 hrs:   BP Temp Temp src Pulse Resp SpO2 Height Weight   06/17/21 0930 108/53 -- -- 71 -- 96 % -- --   06/17/21 0900 116/71 -- -- 68 -- 97 % -- --   06/17/21 0830 101/47 -- -- 65 -- 97 % -- --   06/17/21 0800 98/56 -- -- 67 -- 96 % -- --   06/17/21 0730 133/58 -- -- 67 -- 94 % -- --   06/17/21 0720 (!) 120/103 95.5 °F (35.3 °C) Axillary 72 20 98 % -- --   06/17/21 0700 -- -- -- 70 -- 92 % -- --   06/17/21 0600 108/71 98.4 °F (36.9 °C) Oral 74 14 94 % -- --   06/17/21 0500 120/60 -- -- 71 -- 98 % -- --   06/17/21 0400 114/54 -- -- 69 -- 95 % -- --   06/17/21 0300 99/59 99.5 °F (37.5 °C) Oral 71 14 93 % -- 69.6 kg (153 lb 7 oz)   06/17/21 0201 -- -- -- 75 -- 96 % -- --   06/17/21 0200 92/53 -- -- -- -- -- -- --   06/17/21 0115 112/51 -- -- -- -- -- -- --   06/17/21 0114 -- -- -- 83 -- 95 % -- --   06/16/21 2245 103/52 -- -- 80 -- 94 % -- --   06/16/21 2215 126/59 -- -- 86 -- 96 % -- --   06/16/21 2130 109/51 -- -- 78 -- 95 % -- --   06/16/21 2030 100/44 -- -- 82 -- 95 % -- --   06/16/21 1933 (!) 89/47 -- -- 88 -- 95 % -- --   06/16/21 1845 114/68 -- -- 93 -- 96 % -- --   06/16/21 1806 -- -- -- -- -- 95 % -- --   06/16/21  "1748 -- -- -- 90 -- -- -- --   06/16/21 1747 99/48 -- -- -- -- -- -- --   06/16/21 1704 -- 100.2 °F (37.9 °C) Oral -- -- -- -- --   06/16/21 1703 -- -- -- 92 -- 94 % -- --   06/16/21 1700 127/53 -- -- 97 22 93 % 147.3 cm (58\") 65.1 kg (143 lb 8 oz)       Intake/Output Summary (Last 24 hours) at 6/17/2021 1004  Last data filed at 6/16/2021 2043  Gross per 24 hour   Intake 300 ml   Output --   Net 300 ml     General: lethargic  Chest:  clear to auscultation bilaterally without respiratory distress  CVS: regular rate and rhythm  Abdominal: soft, nontender, positive bowel sounds  Extremities: no cyanosis or edema  Skin: warm and dry without rash      Labs:  Results from last 7 days   Lab Units 06/17/21  0609 06/16/21  1706 06/16/21  1245   WBC 10*3/mm3 8.52 6.65 5.45   HEMOGLOBIN g/dL 9.9* 8.5* 8.9*   HEMATOCRIT % 32.3* 27.3* 28.0*   PLATELETS 10*3/mm3 130* 145 156         Results from last 7 days   Lab Units 06/17/21  0609 06/16/21  1706 06/16/21  1245   SODIUM mmol/L 135* 137 137   POTASSIUM mmol/L 3.8 3.1* 3.1*   CHLORIDE mmol/L 94* 95* 94*   CO2 mmol/L 28.0 28.0 28.0   BUN mg/dL 30* 25* 25*   CREATININE mg/dL 4.26* 4.06* 3.68*   CALCIUM mg/dL 9.2 9.1 9.0   BILIRUBIN mg/dL 0.8 0.6 0.6   ALK PHOS U/L 200* 199* 222*   ALT (SGPT) U/L 18 19 20   AST (SGOT) U/L 29 36* 36*   GLUCOSE mg/dL 97 113* 150*       Radiology:   Imaging Results (Last 72 Hours)     Procedure Component Value Units Date/Time    XR Femur 2 View Right [[id number]] Collected: 06/16/21 1933     Updated: 06/16/21 1937    Narrative:      EXAMINATION: XR FEMUR 2 VW RIGHT-     6/16/2021 6:40 PM CDT     HISTORY: rt leg pain, fall     Right femur, 4 views.     No femur fracture is seen.     The hip and knee are normal, to the extent visualized.     No soft tissue abnormality is seen.       Impression:      1. No acute bony abnormality.              This report was finalized on 06/16/2021 19:34 by Dr. Dharmesh Zuleta MD.    XR Pelvis 1 or 2 View [[id number]] " Collected: 06/16/21 1931     Updated: 06/16/21 1936    Narrative:      EXAMINATION: XR PELVIS 1 OR 2 VW-     6/16/2021 6:40 PM CDT     HISTORY: fall, pain     Pelvis, one view.     Mildly displaced superior and inferior right pubic ramus fractures are  not acute and are seen on a CT exam from 6/1/2021.     . SI joints are symmetric.     No sacral fracture is seen.     No hip fracture or dislocation.     Summary:  1. Old fractures of the right superior and inferior pubic ramus.  2. No acute fracture is seen.  This report was finalized on 06/16/2021 19:33 by Dr. Dharmesh Zuleta MD.    XR Hand 3+ View Right [[id number]] Collected: 06/16/21 1930     Updated: 06/16/21 1934    Narrative:      EXAMINATION: XR HAND 3+ VW RIGHT-     6/16/2021 6:40 PM CDT     HISTORY: Fall injury. Right hand pain.     Right hand, 3 views.     Osteopenia.     Chronic appearing small soft tissue calcifications are seen adjacent to  the third and fourth MCP joints.     Metacarpals and fingers show no sign of acute fracture.     The distal aspect of the second finger and third finger are obscured by  a Sensor and wire on some of the images.     Carpal bones align normally.     Summary:  1. No acute fracture is seen.     This report was finalized on 06/16/2021 19:31 by Dr. Dharmesh Zuleta MD.    XR Wrist 3+ View Right [[id number]] Collected: 06/16/21 1929     Updated: 06/16/21 1932    Narrative:      EXAMINATION: XR WRIST 3+ VW RIGHT-     6/16/2021 6:40 PM CDT     HISTORY: Fall injury. Right wrist pain.     Right wrist, 3 views.     Osteopenia.     Diffuse arterial calcification.     Intact distal radius and ulna.     Carpal bones align normally.     Proximal metacarpals are intact.     Summary:  1. No acute fracture.     This report was finalized on 06/16/2021 19:29 by Dr. Dharmesh Zuleta MD.    XR Chest 1 View [[id number]] Collected: 06/16/21 1927     Updated: 06/16/21 1932    Narrative:      EXAMINATION: XR CHEST 1 VW-     6/16/2021 6:40 PM CDT      HISTORY: fever, AMS     1 view chest x-ray compared with 6/7/2021.     Stable heart and mediastinum.  Aortic arch calcification.     Interval removal of a right jugular central line.     There is increased interstitial prominence compatible with interstitial  edema and early heart failure.     There is an unchanged appearance of a moderate right pleural effusion.     Nonhealed fracture of the right humeral neck.  This finding was demonstrated on a radiograph from 4/1/2021.     Summary:  1. Increased interstitial prominence compatible with interstitial edema  and early failure.  2. Unchanged or minimally decreased moderate right pleural effusion.  This report was finalized on 06/16/2021 19:28 by Dr. Dharmesh Zuleta MD.    CT Cervical Spine Without Contrast [[id number]] Collected: 06/16/21 1827     Updated: 06/16/21 1832    Narrative:      EXAMINATION: CT CERVICAL SPINE WO CONTRAST-      6/16/2021 6:13 PM CDT     HISTORY: Fall injury. Neck pain. Altered mental status.     In order to have a CT radiation dose as low as reasonably achievable  Automated Exposure Control was utilized for adjustment of the mA and/or  KV according to patient size.     DLP in mGycm= 593.     Noncontrast cervical spine CT.     Comparison is made with cervical spine CT imaging from 3/26/2021.     The patient was unable to cooperate and the images are affected by  motion.     Mild right convex curvature on the coronal sequence is negative and  probably is positional.     The lateral view shows appropriate lordotic curvature with no vertebral  body malalignment.     Prevertebral soft tissues are normal.     Facet joints align normally.     Summary:  1. No acute fracture is seen.                                   This report was finalized on 06/16/2021 18:29 by Dr. Dharmesh Zuleta MD.    CT Head Without Contrast [[id number]] Collected: 06/16/21 1826     Updated: 06/16/21 1830    Narrative:      EXAMINATION: CT HEAD WO CONTRAST-      6/16/2021 6:13 PM  CDT     HISTORY: Altered mental status.     In order to have a CT radiation dose as low as reasonably achievable  Automated Exposure Control was utilized for adjustment of the mA and/or  KV according to patient size.     DLP in mGycm= 594.     Noncontrast head CT.     Comparison is made with a noncontrast head CT from 6/1/2021.     Axial, sagittal, and coronal noncontrast CT imaging of the head.     The visualized paranasal sinuses are clear.     The brain and ventricles have an age appropriate appearance.   There is no hemorrhage or mass-effect.   No acute infarction is seen.     No calvarial abnormality.       Impression:      1. No acute intracranial abnormality is seen.  2. Stable chronic change.                                         This report was finalized on 06/16/2021 18:27 by Dr. Dharmesh Zuleta MD.          Culture:  No results found for: BLOODCX, URINECX, WOUNDCX, MRSACX, RESPCX, STOOLCX      Assessment   1.  End stage renal disease on HD TTS  2.  Metabolic encephalopathy  3.  Sepsis  4.  Type 2 diabetes with hypoglycemia  5.  Hypertension  6.  Anemia of CKD  7.  COPD  7.  Hypokalemia--improved  9.  C Diff colitis    Plan:  1.  Dialysis is due today  2.  Monitor labs  3.  Further assessment and plan pending Dr Gonzalez's evaluation of patient      Thank you for the consult, we appreciate the opportunity to provide care to your patients.  Feel free to contact me if I can be of any further assistance.      ANTHONY Reid  6/17/2021  10:04 CDT    Electronically signed by Gomez Lemos, ANTHONY at 06/17/21 1011     Vimal Jewell, PharmD at 06/17/21 0502          Pharmacy Dosing Service  Antimicrobial Dosing  Cefepime & Vancomycin    Assessment/Action/Plan:  Pharmacy to dose Cefepime and Vancomycin requests for dialysis patient for Sepsis. Initiated the following tentative dosing regimens until nephrology determines dialysis to be utilized while hospitalized.    Initiated  "renally-adjusted/HD patient extended-infusion dosing of Cefepime 2 gm IV over 4 hours every 24 hours, following initial dose given in ER. Current end date/final dose: 6/23/21 at 1800.    Initiated Vancomycin 750 mg IV every 48 hours, following a 1,000 mg dose given in ER. No levels ordered at this time. Current end date/final dose: 6/24/21 at 2000.    Pharmacy will continue to monitor daily and make further adjustment(s) accordingly.     Subjective:  Mini Gentile is a 61 y.o. female with a  \"Pharmacy to Dose Cefepime and Vancomycin\" consult for the treatment of Sepsis , day 2 of treatment.    Objective:  Ht: 147.3 cm (58\"); Wt: 69.6 kg (153 lb 7 oz)  Estimated Creatinine Clearance: 12.6 mL/min (A) (by C-G formula based on SCr of 4.06 mg/dL (H)).   Creatinine   Date Value Ref Range Status   06/16/2021 4.06 (H) 0.57 - 1.00 mg/dL Final   06/16/2021 3.68 (H) 0.57 - 1.00 mg/dL Final      Lab Results   Component Value Date    WBC 6.65 06/16/2021    WBC 5.45 06/16/2021      Baseline culture results:  Microbiology Results (last 10 days)       Procedure Component Value - Date/Time    Clostridium Difficile Toxin - Stool, Per Rectum [[id number]]  (Abnormal) Collected: 06/17/21 0212    Lab Status: Final result Specimen: Stool from Per Rectum Updated: 06/17/21 0322    Narrative:      The following orders were created for panel order Clostridium Difficile Toxin - Stool, Per Rectum.  Procedure                               Abnormality         Status                     ---------                               -----------         ------                     Clostridium Difficile To...[[id number]]  Abnormal            Final result                 Please view results for these tests on the individual orders.    Gastrointestinal Panel, PCR - Stool, Per Rectum [[id number]]  (Normal) Collected: 06/17/21 0212    Lab Status: Final result Specimen: Stool from Per Rectum Updated: 06/17/21 0342     Campylobacter Not Detected     Plesiomonas " shigelloides Not Detected     Salmonella Not Detected     Vibrio Not Detected     Vibrio cholerae Not Detected     Yersinia enterocolitica Not Detected     Enteroaggregative E. coli (EAEC) Not Detected     Enteropathogenic E. coli (EPEC) Not Detected     Enterotoxigenic E. coli (ETEC) lt/st Not Detected     Shiga-like toxin-producing E. coli (STEC) stx1/stx2 Not Detected     Shigella/Enteroinvasive E. coli (EIEC) Not Detected     Cryptosporidium Not Detected     Cyclospora cayetanensis Not Detected     Entamoeba histolytica Not Detected     Giardia lamblia Not Detected     Adenovirus F40/41 Not Detected     Astrovirus Not Detected     Norovirus GI/GII Not Detected     Rotavirus A Not Detected     Sapovirus (I, II, IV or V) Not Detected    Narrative:      If Aeromonas, Staphylococcus aureus or Bacillus cereus are suspected, please order [id number]: Stool Culture, Aeromonas, S aureus, B Cereus.    Clostridium Difficile Toxin, PCR - Stool, Per Rectum [[id number]]  (Abnormal) Collected: 06/17/21 0212    Lab Status: Final result Specimen: Stool from Per Rectum Updated: 06/17/21 0322     C. Difficile Toxins by PCR Positive    Narrative:      Performance characteristics of test not established for patients <2 years of age.    COVID-19,[facility] IN-HOUSE,Nasal Swab No Transport Media 3-4 HR TAT - Swab, Nasal Cavity [[id number]]  (Normal) Collected: 06/16/21 1813    Lab Status: Final result Specimen: Swab from Nasal Cavity Updated: 06/16/21 1912     COVID19 Not Detected    Narrative:      Fact sheet for providers: https://www.fda.gov/media/524334/download     Fact sheet for patients: https://www.fda.gov/media/779805/download    Test performed by PCR.    Consider negative results in combination with clinical observations, patient history, and epidemiological information.    COVID PRE-OP / PRE-PROCEDURE SCREENING ORDER (NO ISOLATION) - Swab, Nasal Cavity [[id number]]  (Normal) Collected: 06/08/21 1613    Lab Status: Final result  Specimen: Swab from Nasal Cavity Updated: 06/08/21 1717    Narrative:      The following orders were created for panel order COVID PRE-OP / PRE-PROCEDURE SCREENING ORDER (NO ISOLATION) - Swab, Nasal Cavity.  Procedure                               Abnormality         Status                     ---------                               -----------         ------                     COVID-19,[facility]...[[id number]]  Normal              Final result                 Please view results for these tests on the individual orders.    COVID-19,[facility] IN-HOU[facility],Nasal Swab No Transport Media 3-4 HR TAT - Swab, Nasal Cavity [[id number]]  (Normal) Collected: 06/08/21 1613    Lab Status: Final result Specimen: Swab from Nasal Cavity Updated: 06/08/21 1717     COVID19 Not Detected    Narrative:      Fact sheet for providers: https://www.fda.gov/media/917151/download     Fact sheet for patients: https://www.fda.gov/media/052690/download    Test performed by PCR.    Consider negative results in combination with clinical observations, patient history, and epidemiological information.            Vimal Jewell, PharmD  06/17/21 04:57 CDT      Electronically signed by Vimal Jewell, PharmD at 06/17/21 0590

## 2021-06-17 NOTE — PLAN OF CARE
Goal Outcome Evaluation:  Plan of Care Reviewed With: other (see comments)        Progress: no change  Outcome Summary: Initial nutrition assessment. Pt is admitted with AMS. She is currently NPO d/t failed RN dysphagia screen. She has a pending ST consult. She was recently treated for aspiration PNA with IV ATB's.  She has ESRD and receives HD 3x/week. Noted non-compliance with HD at times. Pt currently also has C.Diff and has had multiple BM's. She resides at a SNF. Will follow for ST diet consistency recommendations.

## 2021-06-17 NOTE — THERAPY EVALUATION
Acute Care - Speech Language Pathology   Swallow Initial Evaluation [facility]     Patient Name: Mini Gentile  : 1960  MRN: [id number]  Today's Date: 2021               Admit Date: 2021  Clinical bedside swallow evaluation completed. The patient was alert and cooperative. Currently receiving dialysis at time of evaluation.     Primary problem: Altered mental status, Cdiff.   Medical history: Recent aspiration pneumonia, noncompliance with dialysis, see medical history for full detail.     Oral motor assessment with generalized weakness. The patient completed a full range of consistencies except for mech soft. No overt s/s of aspiration were observed. Increased prep time given regular solids.     SLP recommends:   1) Mechanical soft diet   2) Thin liquids   3) Meds whole with puree  4) Oral care, standard swallow precautions, fatigue precautions     SLP will follow up to ensure diet toleration.   Maia Gonsalez, MS CCC-SLP 2021 14:41 CDT    Visit Dx:     ICD-10-CM ICD-9-CM   1. Altered mental status, unspecified altered mental status type  R41.82 780.97   2. Hypoxia  R09.02 799.02   3. Elevated troponin  R77.8 790.6   4. End stage renal disease (CMS/HCC)  N18.6 585.6   5. Anemia, unspecified type  D64.9 285.9   6. Hypotension, unspecified hypotension type  I95.9 458.9   7. Fall, initial encounter  W19.XXXA E888.9   8. 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   • Eustachian tube dysfunction   • Chronic otitis media   • Pneumonia of left upper lobe due to Streptococcus (CMS/HCC)   • End stage renal failure on dialysis (CMS/HCC)   • Diabetes (CMS/HCC)   • Glaucoma   • HTN (hypertension)   • Proteinuria   • Type 2 diabetes mellitus, with long-term current use of insulin (CMS/HCC)   • Chronic anemia   • Acute pulmonary edema (CMS/HCC)   • Non-compliance with renal dialysis (CMS/HCC)   •  Elevated troponin due to ESRD    • Hyperkalemia   • Encephalopathy, metabolic, due to uremia   • High anion gap metabolic acidosis due to uremia   • Respiratory distress   • Acute diastolic heart failure (CMS/[facility])   • Lymph node enlargement, left axillary measuring 1.1 cm -follow-up for primary care   • Nausea vomiting and diarrhea   • Acute diastolic heart failure (CMS/[facility])   • Diabetes mellitus with ophthalmic complication (CMS/[facility])   • Tobacco abuse   • Urinary tract infection with hematuria   • Pneumonia due to infectious organism   • Abnormal urine findings   • Closed fracture of right proximal humerus   • Hyponatremia   • Hypothyroidism (acquired)   • Forehead laceration secondary to fall   • Heme positive stool   • Dialysis AV fistula malfunction, initial encounter (CMS/[facility])   • Aspiration into airway   • Bronchospasm, acute   • Altered mental status   • Protein-calorie malnutrition (CMS/[facility])   • Pupil irregular   • Sepsis (CMS/[facility])   • Hypoglycemia   • Hyponatremia   • Diarrhea   • Hypokalemia   • C. 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    • Chronic otitis media    • Diabetes (CMS/[facility])    • End stage renal disease on dialysis (CMS/[facility])    • Eustachian tube dysfunction    • GERD (gastroesophageal reflux disease)    • Glaucoma    • HTN (hypertension)    • Hyperlipidemia    • Mucoid otitis media    • Noncompliance of patient with renal dialysis (CMS/[facility])    • Tobacco abuse      Past Surgical History:   Procedure Laterality Date   • ARTERIOVENOUS FISTULA     • CATARACT EXTRACTION WITH INTRAOCULAR LENS IMPLANT     •  SECTION     • CHOLECYSTECTOMY     • MYRINGOTOMY W/ TUBES Bilateral    • MYRINGOTOMY W/ TUBES Right    • TUBAL ABDOMINAL LIGATION          SWALLOW EVALUATION (last 72 hours)      SLP Adult Swallow Evaluation     Row Name 21 [id number]                   Rehab Evaluation    Document Type  evaluation  -MM         Subjective Information  no complaints  -MM        Patient Observations  alert;cooperative;agree to therapy  -MM        Patient/Family/Caregiver Comments/Observations  No family present.   -MM        Care Plan Review  evaluation/treatment results reviewed  -MM        Care Plan Review, Other Participant(s)  other (see comments) JENNIFFER Joshi   -MASOUD        Patient Effort  good  -MM        Symptoms Noted During/After Treatment  none  -MM           General Information    Patient Profile Reviewed  yes  -MM        Pertinent History Of Current Problem  Primary problem: Altered mental status, Cdiff. Medical history: Recent aspiration pneumonia, noncompliance with dialysis, see medical history for full detail.   -MM        Current Method of Nutrition  NPO  -MM        Precautions/Limitations, Vision  WFL;for purposes of eval  -MM        Precautions/Limitations, Hearing  WFL;for purposes of eval  -MM        Prior Level of Function-Communication  unknown  -MM        Prior Level of Function-Swallowing  no diet consistency restrictions  -MM        Plans/Goals Discussed with  patient;other (see comments);agreed upon JENNIFFER CARTER        Barriers to Rehab  none identified  -MM        Patient's Goals for Discharge  return to PO diet  -MM           Pain    Additional Documentation  Pain Scale: FACES Pre/Post-Treatment (Group)  -MM           Pain Scale: FACES Pre/Post-Treatment    Pain: FACES Scale, Pretreatment  0-->no hurt  -MM        Posttreatment Pain Rating  0-->no hurt  -MM           Oral Motor Structure and Function    Dentition Assessment  upper dentures/partial in place;lower dentures/partial in place  -MM        Secretion Management  WNL/WFL  -MM        Mucosal Quality  moist, healthy  -MM        Volitional Swallow  WFL  -MM        Volitional Cough  WFL  -MM           Oral Musculature and Cranial Nerve Assessment    Oral Motor General Assessment  generalized oral motor weakness  -MM           General Eating/Swallowing  Observations    Respiratory Support Currently in Use  room air  -MM        Eating/Swallowing Skills  fed by SLP  -MM        Positioning During Eating  upright in bed  -MM        Utensils Used  spoon;straw  -MM        Consistencies Trialed  regular textures;honey-thick liquids;nectar/syrup-thick liquids;thin liquids;pureed  -MM           Clinical Swallow Eval    Oral Prep Phase  impaired  -MM        Oral Transit  WFL  -MM        Oral Residue  WFL  -MM        Pharyngeal Phase  no overt signs/symptoms of pharyngeal impairment  -MM        Esophageal Phase  unremarkable  -MM        Clinical Swallow Evaluation Summary  See note  -MM           Oral Prep Concerns    Oral Prep Concerns  prolonged mastication  -MM        Prolonged Mastication  regular consistencies  -MM           Clinical Impression    Barriers to Overall Progress (SLP)  None  -MM        SLP Swallowing Diagnosis  R/O pharyngeal dysphagia  -MM        Functional Impact  risk of aspiration/pneumonia  -MM        Rehab Potential/Prognosis, Swallowing  good, to achieve stated therapy goals  -MM        Swallow Criteria for Skilled Therapeutic Interventions Met  demonstrates skilled criteria  -MM           Recommendations    Therapy Frequency (Swallow)  at least;2 days per week  -MM        Predicted Duration Therapy Intervention (Days)  until discharge  -MM        SLP Diet Recommendation  soft textures;ground;thin liquids  -MM        Recommended Precautions and Strategies  upright posture during/after eating;small bites of food and sips of liquid;general aspiration precautions;fatigue precautions  -MM        Oral Care Recommendations  Oral Care BID/PRN;Toothbrush  -MM        SLP Rec. for Method of Medication Administration  meds whole;with pudding or applesauce;as tolerated  -MM        Monitor for Signs of Aspiration  yes;notify SLP if any concerns  -MM        Anticipated Discharge Disposition (SLP)  skilled nursing facility  -MM           Swallow Goals (SLP)    Oral  Nutrition/Hydration Goal Selection (SLP)  oral nutrition/hydration, SLP goal 1  -MM           Oral Nutrition/Hydration Goal 1 (SLP)    Oral Nutrition/Hydration Goal 1, SLP  Patient will tolerate LRD without s/s of aspiration.  -MM        Time Frame (Oral Nutrition/Hydration Goal 1, SLP)  by discharge  -MM        Barriers (Oral Nutrition/Hydration Goal 1, SLP)  none  -MM        Progress/Outcomes (Oral Nutrition/Hydration Goal 1, SLP)  goal ongoing  -MM          User Key  (r) = Recorded By, (t) = Taken By, (c) = Cosigned By    Initials Name Effective Dates    MM Maia Gonsalez, MS CCC-SLP 06/16/21 -           EDUCATION  The patient has been educated in the following areas:   Dysphagia (Swallowing Impairment) Oral Care/Hydration Modified Diet Instruction.    SLP Recommendation and Plan  SLP Swallowing Diagnosis: R/O pharyngeal dysphagia  SLP Diet Recommendation: soft textures, ground, thin liquids  Recommended Precautions and Strategies: upright posture during/after eating, small bites of food and sips of liquid, general aspiration precautions, fatigue precautions  SLP Rec. for Method of Medication Administration: meds whole, with pudding or applesauce, as tolerated     Monitor for Signs of Aspiration: yes, notify SLP if any concerns     Swallow Criteria for Skilled Therapeutic Interventions Met: demonstrates skilled criteria  Anticipated Discharge Disposition (SLP): skilled nursing facility  Rehab Potential/Prognosis, Swallowing: good, to achieve stated therapy goals  Therapy Frequency (Swallow): at least, 2 days per week  Predicted Duration Therapy Intervention (Days): until discharge                         Plan of Care Reviewed With: patient, other (see comments) (JENNIFFER Joshi)  Progress: no change (Initial Evaluation)    SLP GOALS     Row Name 06/17/21 1340             Oral Nutrition/Hydration Goal 1 (SLP)    Oral Nutrition/Hydration Goal 1, SLP  Patient will tolerate LRD without s/s of aspiration.  -MM       Time Frame (Oral Nutrition/Hydration Goal 1, SLP)  by discharge  -MM      Barriers (Oral Nutrition/Hydration Goal 1, SLP)  none  -MM      Progress/Outcomes (Oral Nutrition/Hydration Goal 1, SLP)  goal ongoing  -MM        User Key  (r) = Recorded By, (t) = Taken By, (c) = Cosigned By    Initials Name Provider Type    Maia Garcia MS CCC-SLP Speech and Language Pathologist             Time Calculation:   Time Calculation- SLP     Row Name 06/17/21 1439             Time Calculation- SLP    SLP Start Time  1340  -MM      SLP Stop Time  1440  -MM      SLP Time Calculation (min)  60 min  -MM      SLP Received On  06/17/21  -MM      SLP Goal Re-Cert Due Date  06/27/21  -MM         Untimed Charges    SLP Eval/Re-eval   ST Eval Oral Pharyng Swallow - 05958  -MM      [id number] Eval Oral Pharyng Swallow Minutes  60  -MM         Total Minutes    Untimed Charges Total Minutes  60  -MM       Total Minutes  60  -MM        User Key  (r) = Recorded By, (t) = Taken By, (c) = Cosigned By    Initials Name Provider Type    Maia Garcia MS CCC-SLP Speech and Language Pathologist          Therapy Charges for Today     Code Description Service Date Service Provider Modifiers Qty    [id number] HC ST EVAL ORAL PHARYNG SWALLOW 4 6/17/2021 Maia Gonsalez MS CCC-SLP GN 1               Maia Gonsalez MS CCC-DARREN  6/17/2021

## 2021-06-17 NOTE — H&P
[facility] Medicine Services  HISTORY AND PHYSICAL    Date of Admission: 6/16/2021  Primary Care Physician: Bharati Dahl APRN    Subjective     Chief Complaint: Altered    History of Present Illness  Patient is a 61-year-old white female past medical history of end-stage renal disease on Tuesday Thursday Saturday dialysis.  She has a significant history of noncompliance with dialysis sessions.  She does reside in a nursing home she was sent over here from [place] with altered mental status.  All the history of obtained is from the ER chart and discussing with the ER attending.  Apparently she was found laying face down in her bed today confused unsure what time she was last known well.  Patient will give me any history she does states she has diarrhea otherwise just moaning in bed.  She is had extensive work-up in the ER including CTs which show nothing acute she does have known pelvic rim fractures which are old.  White counts normal creatinine is 4, she is anemic with hemoglobin 8.5 approximately baseline.  Troponin is chronically elevated she does have a significantly elevated procalcitonin at 12.46, she is also hypokalemic with potassium 3.1, mag and Phos are normal.  A urine was obtained but only enough was available to either do a UDS, urinalysis, or urine culture therefore culture is pending.  She is somewhat hypotensive at times concerning for sepsis.  She has recently been on IV antibiotics for an aspiration pneumonia.  Unable to determine if the diarrhea is chronic there is some documentation of that or if this is a recurrence.  I have ordered a stool C. difficile and PCR panel.  She is being admitted with altered mental status and possible sepsis.        Review of Systems   Unable to obtain patient is obtunded.  Otherwise complete ROS reviewed and negative except as mentioned in the HPI.    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    • Chronic otitis media    • Diabetes (CMS/[facility])    • End stage renal disease on dialysis (CMS/[facility])    • Eustachian tube dysfunction    • GERD (gastroesophageal reflux disease)    • Glaucoma    • HTN (hypertension)    • Hyperlipidemia    • Mucoid otitis media    • Noncompliance of patient with renal dialysis (CMS/[facility])    • Tobacco abuse      Past Surgical History:  Past Surgical History:   Procedure Laterality Date   • ARTERIOVENOUS FISTULA     • CATARACT EXTRACTION WITH INTRAOCULAR LENS IMPLANT     •  SECTION     • CHOLECYSTECTOMY     • MYRINGOTOMY W/ TUBES Bilateral    • MYRINGOTOMY W/ TUBES Right    • TUBAL ABDOMINAL LIGATION       Social History:  reports that she has been smoking cigarettes. She has a 12.00 pack-year smoking history. She has never used smokeless tobacco. She reports that she does not drink alcohol and does not use drugs.    Family History: family history includes Diabetes in her father; Heart disease in her mother.       Allergies:  Allergies   Allergen Reactions   • Bupropion Nausea Only   • Chantix [Varenicline] Other (See Comments)     Does not remember   • Gabapentin Hallucinations     hallucinations   • Azithromycin Rash   • Levofloxacin Rash   • Penicillins Rash   • Sulfa Antibiotics Rash       Medications:  Prior to Admission medications    Medication Sig Start Date End Date Taking? Authorizing Provider   albuterol sulfate  (90 Base) MCG/ACT inhaler Inhale 2 puffs Every 4 (Four) Hours As Needed for Wheezing.  Patient taking differently: Inhale 2 puffs Every 6 (Six) Hours As Needed for Wheezing. 20   Sundeep Aldridge MD   aspirin 81 MG EC tablet Take 81 mg by mouth Daily.    Provider, MD Steve   atropine 1 % ophthalmic solution Administer 1 drop into the left eye Daily.    ProviderSteve MD   brimonidine (ALPHAGAN) 0.2 % ophthalmic solution Administer 1 drop into the left eye 2 (Two) Times a Day As  Needed.    Steve Warren MD   carvedilol (COREG) 6.25 MG tablet Take 6.25 mg by mouth 2 (Two) Times a Day With Meals.    Steve Warren MD   cholecalciferol (VITAMIN D3) 1000 units tablet Take 2,000 Units by mouth Daily.    Steve Warren MD   dorzolamide (TRUSOPT) 2 % ophthalmic solution Administer 1 drop into the left eye 2 (Two) Times a Day.    Steve Warren MD   epoetin vika-epbx (RETACRIT) 56699 UNIT/ML injection Inject 1 mL under the skin into the appropriate area as directed 3 (Three) Times a Week. Indications: ESRD on Dialysis 6/9/21   Sundeep Aldridge MD   famotidine (PEPCID) 20 MG tablet Take 1 tablet by mouth Daily. 6/8/21   Sundeep Aldridge MD   Ferric Citrate 1  MG(Fe) tablet Take 4 tablets by mouth 3 (Three) Times a Day With Meals. 6/8/21   Sundeep Aldridge MD   fluticasone (FLONASE) 50 MCG/ACT nasal spray 2 sprays into the nostril(s) as directed by provider 2 (Two) Times a Day.    Steve Warren MD   insulin lispro (humaLOG) 100 UNIT/ML injection Inject 2-7 Units under the skin into the appropriate area as directed 3 (Three) Times a Day Before Meals. 6/8/21   Sundeep Aldridge MD   ipratropium-albuterol (DUO-NEB) 0.5-2.5 mg/3 ml nebulizer Take 3 mL by nebulization Every 4 (Four) Hours As Needed for Wheezing or Shortness of Air.    Steve Warren MD   lactobacillus acidophilus (RISAQUAD) capsule capsule Take 1 capsule by mouth Daily. 6/8/21   Sundeep Aldridge MD   metoclopramide (REGLAN) 5 MG tablet Take 1 tablet by mouth 4 (Four) Times a Day Before Meals & at Bedtime. 6/8/21   Sundeep Aldridge MD   ondansetron (ZOFRAN) 4 MG tablet Take 1 tablet by mouth Every 6 (Six) Hours As Needed for Nausea or Vomiting. 6/8/21   Sundeep Aldridge MD   pravastatin (PRAVACHOL) 10 MG tablet Take 10 mg by mouth Every Night.    Provider, MD Steve   sertraline (ZOLOFT) 25 MG tablet Take 1 tablet by mouth  "Daily. 6/8/21   Sundeep Aldridge MD   timolol (TIMOPTIC) 0.5 % ophthalmic solution Administer 1 drop into the left eye Every Morning. 6/8/21   Sundeep Aldridge MD     I have utilized all available immediate resources to obtain, update, and review the patient's current medications.    Objective     Vital Signs: BP 92/53   Pulse 75   Temp 100.2 °F (37.9 °C) (Oral)   Resp 22   Ht 147.3 cm (58\")   Wt 65.1 kg (143 lb 8 oz)   SpO2 96%   BMI 29.99 kg/m²   Physical Exam   Gen.: Chronically ill-appearing white female in no acute distress moaning in the bed  HEENT: Atraumatic, normocephalic.  Pupils equal, round, and reactive to light. Extraocular movements intact.  Sclerae anicteric.  External ears negative.  Mucous membranes moist.  Neck is supple without lymphadenopathy.  No JVD is noted.  No carotid bruits are auscultated.  Oropharynx is without erythema or exudate.   Chest: Clear to auscultation and percussion.  CV: Regular rate and rhythm.  Normal S1-S2.  No gallops, murmurs. or rubs.  Abdomen: Soft, nontender, nondistended.  Active bowel sounds,  No hepatosplenomegaly.  No masses.  No hernias.  Liquid stool in diaper  Extremities: No clubbing, edema, or cyanosis.  Capillary refill is normal.  Pulses are 2+ and symmetric at radial and dorsalis pedis.  Posterior tibial pulses are intact and 2+ palpable.  Neuro: Patient is arousable but obtunded x 0.  Cranial nerves II through XII are grossly intact.  Motor is moves all extremities.  DTRs are 2+ and symmetric bilaterally. Sensory exam is nonfocal  Skin: Warm, dry, and intact.  No evidence of breakdown.  Sensorium: Patient is obtunded    Nursing notes and vital signs reviewed        Results Reviewed:  Lab Results (last 24 hours)     Procedure Component Value Units Date/Time    Clostridium Difficile Toxin - Stool, Per Rectum [[id number]] Collected: 06/17/21 0212    Specimen: Stool from Per Rectum Updated: 06/17/21 0217    Narrative:      The " following orders were created for panel order Clostridium Difficile Toxin - Stool, Per Rectum.  Procedure                               Abnormality         Status                     ---------                               -----------         ------                     Clostridium Difficile To...[[id number]]                      In process                   Please view results for these tests on the individual orders.    Gastrointestinal Panel, PCR - Stool, Per Rectum [[id number]] Collected: 06/17/21 0212    Specimen: Stool from Per Rectum Updated: 06/17/21 0217    Clostridium Difficile Toxin, PCR - Stool, Per Rectum [[id number]] Collected: 06/17/21 0212    Specimen: Stool from Per Rectum Updated: 06/17/21 0217    Timed Lactic Acid, Reflex [[id number]]  (Abnormal) Collected: 06/16/21 2050    Specimen: Blood Updated: 06/16/21 2119     Lactate 2.3 mmol/L     COVID-19,[facility],Nasal Swab No Transport Media 3-4 HR TAT - Swab, Nasal Cavity [[id number]]  (Normal) Collected: 06/16/21 1813    Specimen: Swab from Nasal Cavity Updated: 06/16/21 1912     COVID19 Not Detected    Narrative:      Fact sheet for providers: https://www.fda.gov/media/653354/download     Fact sheet for patients: https://www.fda.gov/media/830019/download    Test performed by PCR.    Consider negative results in combination with clinical observations, patient history, and epidemiological information.    Manual Differential [[id number]]  (Abnormal) Collected: 06/16/21 1706    Specimen: Blood Updated: 06/16/21 1839     Neutrophil % 87.0 %      Lymphocyte % 4.0 %      Monocyte % 3.0 %      Bands %  6.0 %      Neutrophils Absolute 6.18 10*3/mm3      Lymphocytes Absolute 0.27 10*3/mm3      Monocytes Absolute 0.20 10*3/mm3      nRBC 2.0 /100 WBC      Anisocytosis Slight/1+     Macrocytes Slight/1+     Polychromasia Slight/1+     WBC Morphology Normal     Platelet Estimate Adequate    CBC & Differential [[id number]]  (Abnormal) Collected: 06/16/21 1706     Specimen: Blood Updated: 06/16/21 1839    Narrative:      The following orders were created for panel order CBC & Differential.  Procedure                               Abnormality         Status                     ---------                               -----------         ------                     CBC Auto Differential[[id number]]        Abnormal            Final result                 Please view results for these tests on the individual orders.    CBC Auto Differential [[id number]]  (Abnormal) Collected: 06/16/21 1706    Specimen: Blood Updated: 06/16/21 1839     WBC 6.65 10*3/mm3      RBC 2.71 10*6/mm3      Hemoglobin 8.5 g/dL      Hematocrit 27.3 %      .7 fL      MCH 31.4 pg      MCHC 31.1 g/dL      RDW 17.7 %      RDW-SD 64.7 fl      MPV 9.5 fL      Platelets 145 10*3/mm3      nRBC 1.1 /100 WBC     Lactic Acid, Plasma [[id number]]  (Abnormal) Collected: 06/16/21 1749    Specimen: Blood Updated: 06/16/21 1818     Lactate 2.8 mmol/L     [facility] Draw [[id number]] Collected: 06/16/21 1706    Specimen: Blood Updated: 06/16/21 1817    Narrative:      The following orders were created for panel order [facility] Draw.  Procedure                               Abnormality         Status                     ---------                               -----------         ------                     Green Top (Gel)[[id number]]                                  Final result               Lavender Top[[id number]]                                     Final result               Red Top[[id number]]                                          Final result                 Please view results for these tests on the individual orders.    Green Top (Gel) [[id number]] Collected: 06/16/21 1706    Specimen: Blood Updated: 06/16/21 1817     Extra Tube Hold for add-ons.     Comment: Auto resulted.       Lavender Top [[id number]] Collected: 06/16/21 1706    Specimen: Blood Updated: 06/16/21 1817     Extra Tube hold for add-on     Comment: Auto  resulted       Red Top [[id number]] Collected: 06/16/21 1706    Specimen: Blood Updated: 06/16/21 1817     Extra Tube Hold for add-ons.     Comment: Auto resulted.       Blood Culture - Blood, Arm, Right [[id number]] Collected: 06/16/21 1740    Specimen: Blood from Arm, Right Updated: 06/16/21 1802    Blood Culture - Blood, Arm, Right [[id number]] Collected: 06/16/21 1749    Specimen: Blood from Arm, Right Updated: 06/16/21 1802    Urine Culture - Urine, Urine, Catheter [[id number]] Collected: 06/16/21 1730    Specimen: Urine, Catheter Updated: 06/16/21 1754    Troponin [[id number]]  (Abnormal) Collected: 06/16/21 1706    Specimen: Blood Updated: 06/16/21 1751     Troponin T 0.136 ng/mL     Narrative:      Troponin T Reference Range:  <= 0.03 ng/mL-   Negative for AMI  >0.03 ng/mL-     Abnormal for myocardial necrosis.  Clinicians would have to utilize clinical acumen, EKG, Troponin and serial changes to determine if it is an Acute Myocardial Infarction or myocardial injury due to an underlying chronic condition.       Results may be falsely decreased if patient taking Biotin.      Protime-INR [[id number]]  (Abnormal) Collected: 06/16/21 1725    Specimen: Blood Updated: 06/16/21 1749     Protime 18.9 Seconds      INR 1.72    aPTT [[id number]]  (Abnormal) Collected: 06/16/21 1725    Specimen: Blood Updated: 06/16/21 1749     PTT 44.0 seconds     Procalcitonin [[id number]]  (Abnormal) Collected: 06/16/21 1706    Specimen: Blood Updated: 06/16/21 1745     Procalcitonin 12.46 ng/mL     Narrative:      As a Marker for Sepsis (Non-Neonates):     1. <0.5 ng/mL represents a low risk of severe sepsis and/or septic shock.  2. >2 ng/mL represents a high risk of severe sepsis and/or septic shock.    As a Marker for Lower Respiratory Tract Infections that require antibiotic therapy:  PCT on Admission     Antibiotic Therapy             6-12 Hrs later  >0.5                          Strongly Recommended            >0.25 - <0.5              "Recommended  0.1 - 0.25                  Discouraged                       Remeasure/reassess PCT  <0.1                         Strongly Discouraged         Remeasure/reassess PCT      As 28 day mortality risk marker: \"Change in Procalcitonin Result\" (>80% or <=80%) if Day 0 (or Day 1) and Day 4 values are available. Refer to http://www.[employer]pct-calculator.com/    Change in PCT <=80 %   A decrease of PCT levels below or equal to 80% defines a positive change in PCT test result representing a higher risk for 28-day all-cause mortality of patients diagnosed with severe sepsis or septic shock.    Change in PCT >80 %   A decrease of PCT levels of more than 80% defines a negative change in PCT result representing a lower risk for 28-day all-cause mortality of patients diagnosed with severe sepsis or septic shock.                Comprehensive Metabolic Panel [[id number]]  (Abnormal) Collected: 06/16/21 1706    Specimen: Blood Updated: 06/16/21 1741     Glucose 113 mg/dL      BUN 25 mg/dL      Creatinine 4.06 mg/dL      Sodium 137 mmol/L      Potassium 3.1 mmol/L      Chloride 95 mmol/L      CO2 28.0 mmol/L      Calcium 9.1 mg/dL      Total Protein 5.9 g/dL      Albumin 3.50 g/dL      ALT (SGPT) 19 U/L      AST (SGOT) 36 U/L      Alkaline Phosphatase 199 U/L      Total Bilirubin 0.6 mg/dL      eGFR Non African Amer 11 mL/min/1.73      Comment: <15 Indicative of kidney failure.        eGFR   Amer --     Comment: <15 Indicative of kidney failure.        Globulin 2.4 gm/dL      A/G Ratio 1.5 g/dL      BUN/Creatinine Ratio 6.2     Anion Gap 14.0 mmol/L     Narrative:      GFR Normal >60  Chronic Kidney Disease <60  Kidney Failure <15      CK [[id number]]  (Normal) Collected: 06/16/21 1706    Specimen: Blood Updated: 06/16/21 1740     Creatine Kinase 37 U/L     Salicylate Level [[id number]]  (Normal) Collected: 06/16/21 1706    Specimen: Blood Updated: 06/16/21 1739     Salicylate <0.3 mg/dL     Acetaminophen Level " [[id number]]  (Normal) Collected: 06/16/21 1706    Specimen: Blood Updated: 06/16/21 1739     Acetaminophen <5.0 mcg/mL     Phosphorus [[id number]]  (Normal) Collected: 06/16/21 1706    Specimen: Blood Updated: 06/16/21 1738     Phosphorus 2.5 mg/dL     Ethanol [[id number]] Collected: 06/16/21 1706    Specimen: Blood Updated: 06/16/21 1736     Ethanol % <0.010 %     Narrative:      Not for legal purposes. Chain of Custody not followed.     Magnesium [[id number]]  (Normal) Collected: 06/16/21 1706    Specimen: Blood Updated: 06/16/21 1735     Magnesium 1.9 mg/dL     Blood Gas, Arterial - [[id number]]  (Abnormal) Collected: 06/16/21 1723    Specimen: Arterial Blood Updated: 06/16/21 1723     Site Right Radial     Rustam's Test Positive     pH, Arterial 7.506 pH units      Comment: 83 Value above reference range        pCO2, Arterial 35.8 mm Hg      pO2, Arterial 54.0 mm Hg      Comment: 85 Value below critical limit        HCO3, Arterial 28.3 mmol/L      Comment: 83 Value above reference range        Base Excess, Arterial 5.0 mmol/L      Comment: 83 Value above reference range        O2 Saturation, Arterial 91.5 %      Comment: 84 Value below reference range        Temperature 37.0 C      Barometric Pressure for Blood Gas 750 mmHg      Modality Room Air     Ventilator Mode NA     Notified Who HOLLIE HANNAH     Notified By [id number]     Notified Time 06/16/2021 17:28     Collected by [id number]     Comment: Meter: [id number]     :  [id number]        pCO2, Temperature Corrected 35.8 mm Hg      pH, Temp Corrected 7.506 pH Units      pO2, Temperature Corrected 54.0 mm Hg         Imaging Results (Last 24 Hours)     Procedure Component Value Units Date/Time    XR Femur 2 View Right [[id number]] Collected: 06/16/21 1933     Updated: 06/16/21 1937    Narrative:      EXAMINATION: XR FEMUR 2 VW RIGHT-     6/16/2021 6:40 PM CDT     HISTORY: rt leg pain, fall     Right femur, 4 views.     No femur fracture is seen.     The  hip and knee are normal, to the extent visualized.     No soft tissue abnormality is seen.       Impression:      1. No acute bony abnormality.              This report was finalized on 06/16/2021 19:34 by Dr. Dharmesh Zuleta MD.    XR Pelvis 1 or 2 View [[id number]] Collected: 06/16/21 1931     Updated: 06/16/21 1936    Narrative:      EXAMINATION: XR PELVIS 1 OR 2 VW-     6/16/2021 6:40 PM CDT     HISTORY: fall, pain     Pelvis, one view.     Mildly displaced superior and inferior right pubic ramus fractures are  not acute and are seen on a CT exam from 6/1/2021.     . SI joints are symmetric.     No sacral fracture is seen.     No hip fracture or dislocation.     Summary:  1. Old fractures of the right superior and inferior pubic ramus.  2. No acute fracture is seen.  This report was finalized on 06/16/2021 19:33 by Dr. Dharmesh Zuleta MD.    XR Hand 3+ View Right [[id number]] Collected: 06/16/21 1930     Updated: 06/16/21 1934    Narrative:      EXAMINATION: XR HAND 3+ VW RIGHT-     6/16/2021 6:40 PM CDT     HISTORY: Fall injury. Right hand pain.     Right hand, 3 views.     Osteopenia.     Chronic appearing small soft tissue calcifications are seen adjacent to  the third and fourth MCP joints.     Metacarpals and fingers show no sign of acute fracture.     The distal aspect of the second finger and third finger are obscured by  a Sensor and wire on some of the images.     Carpal bones align normally.     Summary:  1. No acute fracture is seen.     This report was finalized on 06/16/2021 19:31 by Dr. Dharmesh Zuleta MD.    XR Wrist 3+ View Right [[id number]] Collected: 06/16/21 1929     Updated: 06/16/21 1932    Narrative:      EXAMINATION: XR WRIST 3+ VW RIGHT-     6/16/2021 6:40 PM CDT     HISTORY: Fall injury. Right wrist pain.     Right wrist, 3 views.     Osteopenia.     Diffuse arterial calcification.     Intact distal radius and ulna.     Carpal bones align normally.     Proximal metacarpals are intact.      Summary:  1. No acute fracture.     This report was finalized on 06/16/2021 19:29 by Dr. Dharmesh Zuleta MD.    XR Chest 1 View [[id number]] Collected: 06/16/21 1927     Updated: 06/16/21 1932    Narrative:      EXAMINATION: XR CHEST 1 VW-     6/16/2021 6:40 PM CDT     HISTORY: fever, AMS     1 view chest x-ray compared with 6/7/2021.     Stable heart and mediastinum.  Aortic arch calcification.     Interval removal of a right jugular central line.     There is increased interstitial prominence compatible with interstitial  edema and early heart failure.     There is an unchanged appearance of a moderate right pleural effusion.     Nonhealed fracture of the right humeral neck.  This finding was demonstrated on a radiograph from 4/1/2021.     Summary:  1. Increased interstitial prominence compatible with interstitial edema  and early failure.  2. Unchanged or minimally decreased moderate right pleural effusion.  This report was finalized on 06/16/2021 19:28 by Dr. Dharmesh Zuleta MD.    CT Cervical Spine Without Contrast [[id number]] Collected: 06/16/21 1827     Updated: 06/16/21 1832    Narrative:      EXAMINATION: CT CERVICAL SPINE WO CONTRAST-      6/16/2021 6:13 PM CDT     HISTORY: Fall injury. Neck pain. Altered mental status.     In order to have a CT radiation dose as low as reasonably achievable  Automated Exposure Control was utilized for adjustment of the mA and/or  KV according to patient size.     DLP in mGycm= 593.     Noncontrast cervical spine CT.     Comparison is made with cervical spine CT imaging from 3/26/2021.     The patient was unable to cooperate and the images are affected by  motion.     Mild right convex curvature on the coronal sequence is negative and  probably is positional.     The lateral view shows appropriate lordotic curvature with no vertebral  body malalignment.     Prevertebral soft tissues are normal.     Facet joints align normally.     Summary:  1. No acute fracture is seen.                                    This report was finalized on 06/16/2021 18:29 by Dr. Dharmesh Zuleta MD.    CT Head Without Contrast [[id number]] Collected: 06/16/21 1826     Updated: 06/16/21 1830    Narrative:      EXAMINATION: CT HEAD WO CONTRAST-      6/16/2021 6:13 PM CDT     HISTORY: Altered mental status.     In order to have a CT radiation dose as low as reasonably achievable  Automated Exposure Control was utilized for adjustment of the mA and/or  KV according to patient size.     DLP in mGycm= 594.     Noncontrast head CT.     Comparison is made with a noncontrast head CT from 6/1/2021.     Axial, sagittal, and coronal noncontrast CT imaging of the head.     The visualized paranasal sinuses are clear.     The brain and ventricles have an age appropriate appearance.   There is no hemorrhage or mass-effect.   No acute infarction is seen.     No calvarial abnormality.       Impression:      1. No acute intracranial abnormality is seen.  2. Stable chronic change.                                         This report was finalized on 06/16/2021 18:27 by Dr. Dharmesh Zuleta MD.        I have personally reviewed and interpreted the radiology studies and ECG obtained at time of admission.     Assessment / Plan     Assessment:   Active Hospital Problems    Diagnosis    • **Altered mental status    • Diarrhea    • Hypokalemia    • End stage renal failure on dialysis (CMS/[facility])    • Diabetes (CMS/[facility])    • HTN (hypertension)    1.  Altered mental status uncertain if this is related to sepsis we will give adjacent a small bolus of LR she had cultures of blood and urine obtained.  We will continue vancomycin and cefepime for now also obtain stool for C. difficile and stool PCR samples.  Serial neuro exams hopefully she will improve with antibiotics and gentle hydration.  2.  Hypotension history of hypertension currently though hypotensive hold blood pressure medications.  Gentle hydration.  3.  Diarrhea check stool panel, and C.  Difficile  4.  End-stage renal disease on dialysis consult nephrology.  5.  Type 2 diabetes Accu-Chek sliding scale insulin as needed.  6.  Anemia chronic stable Procrit or equivalent will be continued.  7.  Home medications reviewed and resumed as appropriate.    Patient seen prior to midnight       Code Status/Advanced Care Plan: Full    The patient's surrogate decision maker is unable to determine at this time.     I discussed my findings and recommendations with the ER attending.    Estimated length of stay is greater than 2 nights.     The patient was seen and examined by me on June 16, 2021 at 11:50 PM.    Electronically signed by Tejinder Kent MD, 06/17/21, 02:21 CDT.

## 2021-06-18 PROBLEM — B96.5 BACTEREMIA DUE TO PSEUDOMONAS: Status: ACTIVE | Noted: 2021-01-01

## 2021-06-18 PROBLEM — R78.81 BACTEREMIA DUE TO PSEUDOMONAS: Status: ACTIVE | Noted: 2021-01-01

## 2021-06-18 NOTE — PROGRESS NOTES
[facility] Medicine Services  INPATIENT PROGRESS NOTE    Patient Name: Mini Gentile  Date of Admission: 6/16/2021  Today's Date: 06/18/21  Length of Stay: 2  Primary Care Physician: Bharati Dahl APRN    Subjective   Chief Complaint: AMS/cdiff    HPI   Patient looks night and day better from yesterday.  She is awake alert interactive with her son at bedside.  She still having some diarrhea overnight but does not complain of abdominal pain.  She says she feels well.  Denies cough or phlegm.  No chest pain or shortness of breath.  No nausea.  Hungry and wants to eat properly.        Review of Systems   All pertinent negatives and positives are as above. All other systems have been reviewed and are negative unless otherwise stated.     Objective    Temp:  [97 °F (36.1 °C)-98.8 °F (37.1 °C)] 98.8 °F (37.1 °C)  Heart Rate:  [65-91] 76  Resp:  [14-16] 14  BP: ()/(40-98) 127/58  Physical Exam  GEN: awake, alert, interactive, NAD  HEENT: EOMI, Anicteric, Trachea midline  Lungs: no wheezing/rales/rhonchi  Heart: RRR, +S1/s2, no rub  ABD: soft, nt, +BS, no guarding/rebound  Extremities:  no cyanosis, no edema  Skin: no rashes or petechiae   Neuro: AAOx3, MONSALVE, no focal deficits   Psych: difficult to assess at this time        Results Review:  I have reviewed the labs, radiology results, and diagnostic studies.    Laboratory Data:   Results from last 7 days   Lab Units 06/18/21  0250 06/17/21  0609 06/16/21  1706   WBC 10*3/mm3 8.02 8.52 6.65   HEMOGLOBIN g/dL 8.9* 9.9* 8.5*   HEMATOCRIT % 29.4* 32.3* 27.3*   PLATELETS 10*3/mm3 126* 130* 145        Results from last 7 days   Lab Units 06/18/21  0250 06/17/21  0609 06/16/21  1706 06/16/21  1245   SODIUM mmol/L 138 135* 137 137   POTASSIUM mmol/L 3.8 3.8 3.1* 3.1*   CHLORIDE mmol/L 98 94* 95* 94*   CO2 mmol/L 29.0 28.0 28.0 28.0   BUN mg/dL 16 30* 25* 25*   CREATININE mg/dL 2.40* 4.26* 4.06* 3.68*   CALCIUM mg/dL 9.0 9.2 9.1 9.0    BILIRUBIN mg/dL  --  0.8 0.6 0.6   ALK PHOS U/L  --  200* 199* 222*   ALT (SGPT) U/L  --  18 19 20   AST (SGOT) U/L  --  29 36* 36*   GLUCOSE mg/dL 123* 97 113* 150*       Culture Data:   No results found for: BLOODCX, URINECX, WOUNDCX, MRSACX, RESPCX, STOOLCX    Radiology Data:   Imaging Results (Last 24 Hours)     ** No results found for the last 24 hours. **          I have reviewed the patient's current medications.     Assessment/Plan     Active Hospital Problems    Diagnosis    • **C. difficile diarrhea    • Bacteremia due to Pseudomonas    • Diarrhea    • Hypokalemia    • Altered mental status    • DM2 (diabetes mellitus, type 2) (CMS/[facility])    • End stage renal failure on dialysis (CMS/[facility])    • Diabetes (CMS/[facility])    • HTN (hypertension)        #1 C. difficile diarrhea -in the setting of recent antibiotics for aspiration pneumonia.  Appears to be more of a mild case.  No abdominal pain.  No white count elevation.  Afebrile.  Getting p.o. vancomycin.    #2 Pseudomonas bacteremia -unclear etiology.  She previously had an aspiration pneumonia but no evidence of on pneumonia now.  Urine did grow bacteria but mixed jacquie and this is a dialysis patient.  Regardless we will start on cefepime at this time given her history of allergies to fluoroquinolones.  Will make treating her C. difficile more complicated unfortunately.  Recheck blood cultures tomorrow morning.  She does not appear toxic.  Will ask ID for some recommendations.    #3 altered mental status -likely metabolic encephalopathy in the setting of her underlying infections and dialysis needs.  She is much better this morning and back to baseline.  Not somnolent.  Alert, oriented, interactive.    #4 hypotension -resolved and blood pressure much better today.  Continue to monitor off Coreg for now.  DC IV fluid.    #5 hypokalemia -resolved.  Likely in setting of diarrhea.    #6 ESRD -nephrology consulted.  Completed HD yesterday.  Further HD per  nephrology    #7 DM2 -sliding scale insulin and hypoglycemia protocol.      Discharge Planning: Ongoing.  Doing better today.  Okay to transfer out of the ICU.  He did clear her blood cultures.  Likely back to SNF when improved.    Electronically signed by Bin Guy DO, 06/18/21, 08:18 CDT.

## 2021-06-18 NOTE — PLAN OF CARE
Goal Outcome Evaluation:              Outcome Summary: Patient oriented to person and place. Decreasing lethargy. Room air with good sat. NSR 70-90. BPs 100-110 systolic with adequate MAP. Multiple BM. Afebrile. Continue to monitor. C/O pain mainly on right side of body but location shifts frequently.

## 2021-06-18 NOTE — THERAPY EVALUATION
Patient Name: Mini Gentile  : 1960    MRN: [id number]                              Today's Date: 2021       Admit Date: 2021    Visit Dx:     ICD-10-CM ICD-9-CM   1. Altered mental status, unspecified altered mental status type  R41.82 780.97   2. Hypoxia  R09.02 799.02   3. Elevated troponin  R77.8 790.6   4. End stage renal disease (CMS/[facility])  N18.6 585.6   5. Anemia, unspecified type  D64.9 285.9   6. Hypotension, unspecified hypotension type  I95.9 458.9   7. Fall, initial encounter  W19.XXXA E888.9   8. Dysphagia, unspecified type  R13.10 787.20   9. Decreased activities of daily living (ADL)  Z78.9 V49.89   10. 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)   • End stage renal failure on dialysis (CMS/[facility])   • Diabetes (CMS/[facility])   • Glaucoma   • HTN (hypertension)   • Proteinuria   • DM2 (diabetes mellitus, type 2) (CMS/[facility])   • Chronic anemia   • Acute pulmonary edema (CMS/[facility])   • Non-compliance with renal dialysis (CMS/[facility])   • Elevated troponin due to ESRD    • Hyperkalemia   • Encephalopathy, metabolic, due to uremia   • High anion gap metabolic acidosis due to uremia   • Respiratory distress   • Acute diastolic heart failure (CMS/[facility])   • Lymph node enlargement, left axillary measuring 1.1 cm -follow-up for primary care   • Nausea vomiting and diarrhea   • Acute diastolic heart failure (CMS/[facility])   • Diabetes mellitus with ophthalmic complication (CMS/[facility])   • Tobacco abuse   • Urinary tract infection with hematuria   • Pneumonia due to infectious organism   • Abnormal urine findings   • Closed fracture of right proximal humerus   • Hyponatremia   • Hypothyroidism (acquired)   • Forehead laceration secondary to fall   • Heme positive stool   • Dialysis AV fistula malfunction, initial  encounter (CMS/[facility])   • Aspiration into airway   • Bronchospasm, acute   • Altered mental status   • Protein-calorie malnutrition (CMS/[facility])   • Pupil irregular   • Sepsis (CMS/[facility])   • Hypoglycemia   • Hyponatremia   • Diarrhea   • Hypokalemia   • C. 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    • Chronic otitis media    • Diabetes (CMS/[facility])    • End stage renal disease on dialysis (CMS/[facility])    • Eustachian tube dysfunction    • GERD (gastroesophageal reflux disease)    • Glaucoma    • HTN (hypertension)    • Hyperlipidemia    • Mucoid otitis media    • Noncompliance of patient with renal dialysis (CMS/[facility])    • Tobacco abuse      Past Surgical History:   Procedure Laterality Date   • ARTERIOVENOUS FISTULA     • CATARACT EXTRACTION WITH INTRAOCULAR LENS IMPLANT     •  SECTION     • CHOLECYSTECTOMY     • MYRINGOTOMY W/ TUBES Bilateral    • MYRINGOTOMY W/ TUBES Right    • TUBAL ABDOMINAL LIGATION       General Information     Row Name 21 1023          Physical Therapy Time and Intention    Document Type  evaluation Pt presented to ED from NH with AMS and falls. Pt with multiple recent admits. R humeral neck fx from , NWB R UE in sling. Dx: hypoxia, elevated troponin.  -ARIES     Mode of Treatment  physical therapy  -ARIES     Row Name 21 1023          General Information    Patient Profile Reviewed  yes  -ARIES     Prior Level of Function  independent:;bed mobility;transfer;gait pt reports independently going outside to smoke, uses a RW  -ARIES     Existing Precautions/Restrictions  fall;shoulder;non-weight bearing;right NWB R UE  -ARIES     Barriers to Rehab  medically complex;previous functional deficit;cognitive status  -ARIES     Row Name 21 1023          Living Environment    Lives With  facility resident  -ARIES     Row Name 21 1023          Home Main Entrance    Number of Stairs, Main Entrance   none  -ARIES     Row Name 06/18/21 1023          Stairs Within Home, Primary    Number of Stairs, Within Home, Primary  none  -ARIES     Row Name 06/18/21 1023          Cognition    Orientation Status (Cognition)  oriented to;person;situation;verbal cues/prompts needed for orientation;place  -ARIES     Row Name 06/18/21 1023          Safety Issues, Functional Mobility    Impairments Affecting Function (Mobility)  balance;cognition;endurance/activity tolerance;strength  -ARIES     Row Name 06/18/21 1023          Relationship/Environment    Name(s) of Who Lives With Patient  Henrry  -ARIES       User Key  (r) = Recorded By, (t) = Taken By, (c) = Cosigned By    Initials Name Provider Type    Natanael Burgess, PT DPT Physical Therapist        Mobility     Row Name 06/18/21 1023          Bed Mobility    Bed Mobility  supine-sit  -ARIES     Supine-Sit [place] (Bed Mobility)  minimum assist (75% patient effort);verbal cues  -ARIES     Assistive Device (Bed Mobility)  head of bed elevated;bed rails  -ARIES     Row Name 06/18/21 1023          Transfers    Comment (Transfers)  ambulated aroudn the bed and to the bedside chair.  -ARIES     Row Name 06/18/21 1023          Bed-Chair Transfer    Bed-Chair [place] (Transfers)  minimum assist (75% patient effort);verbal cues  -ARIES     Row Name 06/18/21 1023          Sit-Stand Transfer    Sit-Stand [place] (Transfers)  minimum assist (75% patient effort);verbal cues  -ARIES     Row Name 06/18/21 1023          Mobility    Extremity Weight-bearing Status  right upper extremity  -ARIES     Right Upper Extremity (Weight-bearing Status)  non weight-bearing (NWB)  -ARIES       User Key  (r) = Recorded By, (t) = Taken By, (c) = Cosigned By    Initials Name Provider Type    Natanael Burgess, PT DPT Physical Therapist        Obj/Interventions     Row Name 06/18/21 1023          Range of Motion Comprehensive    Comment, General Range of Motion  B LE AROM WFL  -ARIES     Row Name 06/18/21 1023           Strength Comprehensive (MMT)    Comment, General Manual Muscle Testing (MMT) Assessment  B LE grossly 4/5  -ARIES     Row Name 06/18/21 1023          Balance    Balance Assessment  sitting static balance;standing static balance  -ARIES     Static Sitting Balance  WFL;unsupported;sitting in chair;sitting, edge of bed  -ARIES     Static Standing Balance  mild impairment;supported;standing  -ARIES     Row Name 06/18/21 1023          Sensory Assessment (Somatosensory)    Sensory Assessment (Somatosensory)  LE sensation intact  -ARIES       User Key  (r) = Recorded By, (t) = Taken By, (c) = Cosigned By    Initials Name Provider Type    Natanael Burgess, PT DPT Physical Therapist        Goals/Plan     Row Name 06/18/21 1023          Bed Mobility Goal 1 (PT)    Activity/Assistive Device (Bed Mobility Goal 1, PT)  sit to supine/supine to sit  -ARIES     [place] Level/Cues Needed (Bed Mobility Goal 1, PT)  supervision required  -ARIES     Time Frame (Bed Mobility Goal 1, PT)  long term goal (LTG);10 days  -ARIES     Progress/Outcomes (Bed Mobility Goal 1, PT)  goal ongoing  -ARIES     Row Name 06/18/21 1023          Transfer Goal 1 (PT)    Activity/Assistive Device (Transfer Goal 1, PT)  sit-to-stand/stand-to-sit;bed-to-chair/chair-to-bed  -ARIES     [place] Level/Cues Needed (Transfer Goal 1, PT)  contact guard assist  -ARIES     Time Frame (Transfer Goal 1, PT)  long term goal (LTG);10 days  -ARIES     Progress/Outcome (Transfer Goal 1, PT)  goal ongoing  -ARIES     Row Name 06/18/21 1023          Gait Training Goal 1 (PT)    Activity/Assistive Device (Gait Training Goal 1, PT)  gait (walking locomotion);assistive device use;decrease fall risk;improve balance and speed;increase endurance/gait distance  -ARIES     [place] Level (Gait Training Goal 1, PT)  contact guard assist  -ARIES     Distance (Gait Training Goal 1, PT)  75  -ARIES     Time Frame (Gait Training Goal 1, PT)  long term goal (LTG);10 days  -ARIES     Progress/Outcome (Gait Training Goal  1, PT)  goal ongoing  -ARIES       User Key  (r) = Recorded By, (t) = Taken By, (c) = Cosigned By    Initials Name Provider Type    Natanael Burgess PT DPT Physical Therapist        Clinical Impression     [facility] Name 06/18/21 1023          Pain    Additional Documentation  Pain Scale: FACES Pre/Post-Treatment (Group)  -ARIES     Row Name 06/18/21 1023          Pain Scale: Numbers Pre/Post-Treatment    Pain Intervention(s)  Repositioned;Ambulation/increased activity  -ARIES     Row Name 06/18/21 1023          Pain Scale: FACES Pre/Post-Treatment    Pain: FACES Scale, Pretreatment  2-->hurts little bit  -ARIES     Pain Location - Side  Right  -ARIES     Pain Location  shoulder  -ARIES     Row Name 06/18/21 1023          Plan of Care Review    Plan of Care Reviewed With  patient  -ARIES     Outcome Summary  PT eval complete. She presents alert and oriented x 3. She required min assist to stand and ambulate within her room and to the bedside chair. She demos weakness and decreased activity tolerance. PT will cont to progress gait, balance, and strength. Anticipate she will return to SNF.  -ARIES     [facility] 06/18/21 1023          Therapy Assessment/Plan (PT)    Patient/Family Therapy Goals Statement (PT)  go home  -ARIES     Rehab Potential (PT)  good, to achieve stated therapy goals  -ARIES     Criteria for Skilled Interventions Met (PT)  yes;meets criteria;skilled treatment is necessary  -ARIES     Predicted Duration of Therapy Intervention (PT)  until d.c  -ARIES     Row Name 06/18/21 1023          Positioning and Restraints    Pre-Treatment Position  in bed  -ARIES     Post Treatment Position  chair  -ARIES     In Chair  notified nsg;reclined;call light within reach;encouraged to call for assist;patient within staff view  -ARIES       User Key  (r) = Recorded By, (t) = Taken By, (c) = Cosigned By    Initials Name Provider Type    Natanael Burgess, PT DPT Physical Therapist        Outcome Measures     [facility] Name 06/18/21 1023          How much help from  another person do you currently need...    Turning from your back to your side while in flat bed without using bedrails?  3  -ARIES     Moving from lying on back to sitting on the side of a flat bed without bedrails?  3  -ARIES     Moving to and from a bed to a chair (including a wheelchair)?  3  -ARIES     Standing up from a chair using your arms (e.g., wheelchair, bedside chair)?  3  -ARIES     Climbing 3-5 steps with a railing?  2  -ARIES     To walk in hospital room?  3  -ARIES     AM-PAC 6 Clicks Score (PT)  17  -ARIES     Row Name 06/18/21 1023 06/18/21 1011       Functional Assessment    Outcome Measure Options  AM-PAC 6 Clicks Basic Mobility (PT)  -ARIES  AM-PAC 6 Clicks Daily Activity (OT)  -TSERING      User Key  (r) = Recorded By, (t) = Taken By, (c) = Cosigned By    Initials Name Provider Type    Natanael Burgess, PT DPT Physical Therapist    Kami Garduno, OTR/L Occupational Therapist        Physical Therapy Education                 Title: PT OT SLP Therapies (In Progress)     Topic: Physical Therapy (In Progress)     Point: Mobility training (Done)     Learning Progress Summary           Patient Acceptance, E, VU,NR by ARIES at 6/18/2021 1023    Comment: progression of PT POC, benefits of activity                   Point: Home exercise program (Not Started)     Learner Progress:  Not documented in this visit.          Point: Body mechanics (Not Started)     Learner Progress:  Not documented in this visit.          Point: Precautions (Not Started)     Learner Progress:  Not documented in this visit.                      User Key     Initials Effective Dates Name Provider Type Discipline    ARIES 08/02/16 -  Natanael Pardo, PT DPT Physical Therapist PT              PT Recommendation and Plan  Planned Therapy Interventions (PT): bed mobility training, transfer training, gait training, balance training, home exercise program, patient/family education, postural re-education, strengthening  Plan of Care Reviewed With:  patient  Outcome Summary: PT eval complete. She presents alert and oriented x 3. She required min assist to stand and ambulate within her room and to the bedside chair. She demos weakness and decreased activity tolerance. PT will cont to progress gait, balance, and strength. Anticipate she will return to SNF.     Time Calculation:   PT Charges     Row Name 06/18/21 1246             Time Calculation    Start Time  1023  -ARIES      Stop Time  1105  -ARIES      Time Calculation (min)  42 min  -ARIES      PT Received On  06/18/21  -ARIES      PT Goal Re-Cert Due Date  06/28/21  -ARIES        User Key  (r) = Recorded By, (t) = Taken By, (c) = Cosigned By    Initials Name Provider Type    Natanael Burgess, PT DPT Physical Therapist        Therapy Charges for Today     Code Description Service Date Service Provider Modifiers Qty    [id number] HC PT RUPALAL MOD COMPLEXITY 3 6/18/2021 Natanael Pardo PT DPT GP 1          PT G-Codes  Outcome Measure Options: AM-PAC 6 Clicks Basic Mobility (PT)  AM-PAC 6 Clicks Score (PT): 17  AM-PAC 6 Clicks Score (OT): 18    Natanael Pardo PT DPT  6/18/2021

## 2021-06-18 NOTE — CONSULTS
[facility]  INPATIENT WOUND CONSULTATION    Today's Date: 06/18/21    Patient Name: Mini Gentile  MRN: [id number]  CSN: [id number]  PCP: Bharati Dahl APRN  Referring Provider: Bin Guy DO  Attending Provider: Bin Guy DO  Length of Stay: 2    SUBJECTIVE   Chief Complaint: Wound of coccyx/buttock    HPI: Mini Gentile, a 61 y.o.female, presents with a past medical hisotry of diabetes, end stage renal disease on dialysis, tobacco abuse, and hyper lipidemia.  Full past medical history is listed below.  Patient resides at [facility] where she was transferred from due to mental status change.  Patient was found laying face down in her bed.  She was noted to have diarrhea and hypotension concerning for sepsis.  She was found to have C. Diff.      Inpatient wound care is consulted due to a wound of the coccyx and buttocks area.  On assessment, all areas appear to be blanching at this time.  There are no active pressure injuries at this time.  There are signs of healed pressure injuries with scarring and blanchable skin.  The patient is at high risk for skin breakdown due to moisture and mobility and activity levels.      Visit Dx:    ICD-10-CM ICD-9-CM   1. Altered mental status, unspecified altered mental status type  R41.82 780.97   2. Hypoxia  R09.02 799.02   3. Elevated troponin  R77.8 790.6   4. End stage renal disease (CMS/[facility])  N18.6 585.6   5. Anemia, unspecified type  D64.9 285.9   6. Hypotension, unspecified hypotension type  I95.9 458.9   7. Fall, initial encounter  W19.XXXA E888.9   8. 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   • Eustachian tube dysfunction   • Chronic otitis media   • Pneumonia of left upper lobe due to Streptococcus (CMS/[facility])   • End stage renal failure on dialysis (CMS/[facility])   • Diabetes (CMS/[facility])   • Glaucoma   • HTN (hypertension)   • Proteinuria    • DM2 (diabetes mellitus, type 2) (CMS/[facility])   • Chronic anemia   • Acute pulmonary edema (CMS/[facility])   • Non-compliance with renal dialysis (CMS/[facility])   • Elevated troponin due to ESRD    • Hyperkalemia   • Encephalopathy, metabolic, due to uremia   • High anion gap metabolic acidosis due to uremia   • Respiratory distress   • Acute diastolic heart failure (CMS/[facility])   • Lymph node enlargement, left axillary measuring 1.1 cm -follow-up for primary care   • Nausea vomiting and diarrhea   • Acute diastolic heart failure (CMS/[facility])   • Diabetes mellitus with ophthalmic complication (CMS/[facility])   • Tobacco abuse   • Urinary tract infection with hematuria   • Pneumonia due to infectious organism   • Abnormal urine findings   • Closed fracture of right proximal humerus   • Hyponatremia   • Hypothyroidism (acquired)   • Forehead laceration secondary to fall   • Heme positive stool   • Dialysis AV fistula malfunction, initial encounter (CMS/[facility])   • Aspiration into airway   • Bronchospasm, acute   • Altered mental status   • Protein-calorie malnutrition (CMS/[facility])   • Pupil irregular   • Sepsis (CMS/[facility])   • Hypoglycemia   • Hyponatremia   • Diarrhea   • Hypokalemia   • C. 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    • Chronic otitis media    • Diabetes (CMS/[facility])    • End stage renal disease on dialysis (CMS/[facility])    • Eustachian tube dysfunction    • GERD (gastroesophageal reflux disease)    • Glaucoma    • HTN (hypertension)    • Hyperlipidemia    • Mucoid otitis media    • Noncompliance of patient with renal dialysis (CMS/[facility])    • Tobacco abuse      Past Surgical History:   Procedure Laterality Date   • ARTERIOVENOUS FISTULA     • CATARACT EXTRACTION WITH INTRAOCULAR LENS IMPLANT     •  SECTION     • CHOLECYSTECTOMY     • MYRINGOTOMY W/ TUBES Bilateral    • MYRINGOTOMY W/ TUBES Right    • TUBAL  ABDOMINAL LIGATION       Social History     Socioeconomic History   • Marital status: Single     Spouse name: Not on file   • Number of children: Not on file   • Years of education: Not on file   • Highest education level: Not on file   Tobacco Use   • Smoking status: Current Every Day Smoker     Packs/day: 2.00     Years: 6.00     Pack years: 12.00     Types: Cigarettes   • Smokeless tobacco: Never Used   Vaping Use   • Vaping Use: Never used   Substance and Sexual Activity   • Alcohol use: No   • Drug use: No   • Sexual activity: Defer     Family History   Problem Relation Age of Onset   • Heart disease Mother    • Diabetes Father    • Colon cancer Neg Hx    • Colon polyps Neg Hx        Allergies:  Allergies   Allergen Reactions   • Bupropion Nausea Only   • Chantix [Varenicline] Other (See Comments)     Does not remember   • Gabapentin Hallucinations     hallucinations   • Azithromycin Rash   • Levofloxacin Rash   • Penicillins Rash   • Sulfa Antibiotics Rash       Medications:    Current Facility-Administered Medications:   •  acetaminophen (TYLENOL) tablet 650 mg, 650 mg, Oral, Q4H PRN, 650 mg at 06/17/21 2032 **OR** acetaminophen (TYLENOL) suppository 650 mg, 650 mg, Rectal, Q4H PRN, Tejinder Kent MD  •  albumin human 25 % IV SOLN 12.5 g, 12.5 g, Intravenous, PRN, Adi Gonzalez MD  •  aspirin EC tablet 81 mg, 81 mg, Oral, Daily, Tejinder Kent MD, 81 mg at 06/18/21 0921  •  atropine 1 % ophthalmic solution 1 drop, 1 drop, Left Eye, Daily, Tejinder Kent MD, 1 drop at 06/18/21 0922  •  brimonidine (ALPHAGAN) 0.2 % ophthalmic solution 1 drop, 1 drop, Left Eye, BID PRN, Tejinder Kent MD, 1 drop at 06/18/21 0923  •  [START ON 6/19/2021] cefepime (MAXIPIME) 500 mg in sodium chloride 0.9 % 100 mL IVPB, 500 mg, Intravenous, Q24H, Bin Guy DO  •  cholecalciferol (VITAMIN D3) tablet 2,000 Units, 2,000 Units, Oral, Daily, Tejinder Kent MD, 2,000 Units at 06/18/21 0921  •   dextrose (D50W) 25 g/ 50mL Intravenous Solution 25 g, 25 g, Intravenous, Q15 Min PRN, Tejinder Kent MD  •  dextrose (GLUTOSE) oral gel 15 g, 15 g, Oral, Q15 Min PRN, Tejinder Kent MD  •  epoetin vika-epbx (RETACRIT) injection 10,000 Units, 10,000 Units, Subcutaneous, Once per day on Tue Thu Sat, Tejinder Kent MD, 10,000 Units at 06/17/21 1120  •  famotidine (PEPCID) tablet 20 mg, 20 mg, Oral, Daily, Tejinder Kent MD, 20 mg at 06/18/21 0921  •  Ferric Citrate tablet 840 mg, 4 tablet, Oral, TID With Meals, Tejinder Kent MD, 840 mg at 06/18/21 0920  •  fluticasone (FLONASE) 50 MCG/ACT nasal spray 2 spray, 2 spray, Nasal, BID, Tejinder Kent MD, 2 spray at 06/18/21 0922  •  glucagon (human recombinant) (GLUCAGEN DIAGNOSTIC) injection 1 mg, 1 mg, Subcutaneous, Q15 Min PRN, Tejinder Kent MD  •  insulin lispro (humaLOG) injection 2-7 Units, 2-7 Units, Subcutaneous, TID AC, Tejinder Kent MD  •  ipratropium-albuterol (DUO-NEB) nebulizer solution 3 mL, 3 mL, Nebulization, Q4H PRN, Tejinder Kent MD  •  lactobacillus acidophilus (RISAQUAD) capsule 1 capsule, 1 capsule, Oral, Daily, Tejinder Kent MD, 1 capsule at 06/18/21 0921  •  ondansetron (ZOFRAN) tablet 4 mg, 4 mg, Oral, Q6H PRN **OR** ondansetron (ZOFRAN) injection 4 mg, 4 mg, Intravenous, Q6H PRN, Tejinder Kent MD  •  pravastatin (PRAVACHOL) tablet 10 mg, 10 mg, Oral, Nightly, Tejinder Kent MD, 10 mg at 06/17/21 2032  •  sertraline (ZOLOFT) tablet 25 mg, 25 mg, Oral, Daily, Tejinder Kent MD, 25 mg at 06/18/21 0921  •  sodium chloride 0.9 % bolus 100 mL, 100 mL, Intravenous, PRN, Adi Gonzalez MD  •  [COMPLETED] Insert peripheral IV, , , Once **AND** sodium chloride 0.9 % flush 10 mL, 10 mL, Intravenous, PRN, Tejinder Kent MD  •  sodium chloride 0.9 % flush 10 mL, 10 mL, Intravenous, Q12H, Tejinder Kent MD, 10 mL at 06/17/21 2033  •  sodium chloride 0.9 % flush 10 mL, 10 mL, Intravenous,  PRN, Tejinder Kent MD  •  timolol (TIMOPTIC) 0.5 % ophthalmic solution 1 drop, 1 drop, Left Eye, Daily, Tejinder Kent MD, 1 drop at 06/18/21 0923  •  vancomycin oral solution reconstituted 125 mg, 125 mg, Oral, Q6H, Bin Guy, DO, 125 mg at 06/18/21 0537    Review of Systems:  Review of Systems   Constitutional: Negative for chills and fever.   HENT: Negative for rhinorrhea and sore throat.    Respiratory: Negative for cough and shortness of breath.    Cardiovascular: Negative for chest pain and palpitations.   Gastrointestinal: Positive for diarrhea. Negative for nausea and vomiting.   Genitourinary: Negative for frequency and urgency.   Musculoskeletal: Positive for gait problem and myalgias. Negative for arthralgias.   Skin: Positive for color change. Negative for wound.   Neurological: Positive for weakness. Negative for dizziness.   Psychiatric/Behavioral: Negative for agitation and behavioral problems.         OBJECTIVE     Vitals:    06/18/21 0900   BP: 135/89   Pulse: 86   Resp:    Temp:    SpO2: 95%       PHYSICAL EXAM  Assessment was completed in conjunction of physical therapy getting patient out of bed to move to chair.   Physical Exam  Vitals and nursing note reviewed.   Constitutional:       General: She is awake.   HENT:      Head: Normocephalic and atraumatic.   Eyes:      General: Lids are normal. Gaze aligned appropriately.   Cardiovascular:      Rate and Rhythm: Normal rate and regular rhythm.   Pulmonary:      Effort: Pulmonary effort is normal. No respiratory distress.   Abdominal:      General: Abdomen is flat.      Palpations: Abdomen is soft.   Musculoskeletal:      Cervical back: Normal range of motion and neck supple.   Feet:      Right foot:      Skin integrity: No ulcer or skin breakdown.      Left foot:      Skin integrity: No ulcer or skin breakdown.   Skin:     General: Skin is warm and dry.      Findings: Erythema present.      Comments: All areas are blanchable.   Slight blanchable erythema present on bony prominences.  Scarring and blanchable erythema of areas of bilateral buttocks and sacrococcygeal region.    Neurological:      Mental Status: She is alert and oriented to person, place, and time.   Psychiatric:         Attention and Perception: Attention normal.         Mood and Affect: Mood normal.         Speech: Speech normal.         Behavior: Behavior is cooperative.           Picture taken by nursing staff:   Darkened areas appear to be scarring from previous pressure injuries.  All areas are blanching now.            Results Review:  Lab Results (last 48 hours)     Procedure Component Value Units Date/Time    POC Glucose Once [[id number]]  (Normal) Collected: 06/18/21 0632    Specimen: Blood Updated: 06/18/21 0643     Glucose 113 mg/dL      Comment: : [id number] Blake DanielMeter ID: [id number]       Manual Differential [[id number]]  (Abnormal) Collected: 06/18/21 0250    Specimen: Blood Updated: 06/18/21 0329     Neutrophil % 90.0 %      Lymphocyte % 4.0 %      Monocyte % 5.0 %      Eosinophil % 1.0 %      Neutrophils Absolute 7.22 10*3/mm3      Lymphocytes Absolute 0.32 10*3/mm3      Monocytes Absolute 0.40 10*3/mm3      Eosinophils Absolute 0.08 10*3/mm3      Hypochromia Slight/1+     Macrocytes Slight/1+     WBC Morphology Normal     Platelet Estimate Decreased    CBC & Differential [[id number]]  (Abnormal) Collected: 06/18/21 0250    Specimen: Blood Updated: 06/18/21 0329    Narrative:      The following orders were created for panel order CBC & Differential.  Procedure                               Abnormality         Status                     ---------                               -----------         ------                     CBC Auto Differential[[id number]]        Abnormal            Final result                 Please view results for these tests on the individual orders.    CBC Auto Differential [[id number]]  (Abnormal) Collected: 06/18/21 0250     Specimen: Blood Updated: 06/18/21 0329     WBC 8.02 10*3/mm3      RBC 2.91 10*6/mm3      Hemoglobin 8.9 g/dL      Hematocrit 29.4 %      .0 fL      MCH 30.6 pg      MCHC 30.3 g/dL      RDW 17.2 %      RDW-SD 63.9 fl      MPV 9.7 fL      Platelets 126 10*3/mm3      nRBC 0.4 /100 WBC     Basic Metabolic Panel [[id number]]  (Abnormal) Collected: 06/18/21 0250    Specimen: Blood Updated: 06/18/21 0316     Glucose 123 mg/dL      BUN 16 mg/dL      Creatinine 2.40 mg/dL      Sodium 138 mmol/L      Potassium 3.8 mmol/L      Comment: Slight hemolysis detected by analyzer. Results may be affected.        Chloride 98 mmol/L      CO2 29.0 mmol/L      Calcium 9.0 mg/dL      eGFR Non African Amer 20 mL/min/1.73      BUN/Creatinine Ratio 6.7     Anion Gap 11.0 mmol/L     Narrative:      GFR Normal >60  Chronic Kidney Disease <60  Kidney Failure <15      Magnesium [[id number]]  (Normal) Collected: 06/18/21 0250    Specimen: Blood Updated: 06/18/21 0316     Magnesium 1.9 mg/dL     POC Glucose Once [[id number]]  (Normal) Collected: 06/18/21 0122    Specimen: Blood Updated: 06/18/21 0133     Glucose 116 mg/dL      Comment: : [id number] Blake Otto ID: [id number]       POC Glucose Once [[id number]]  (Normal) Collected: 06/17/21 1626    Specimen: Blood Updated: 06/17/21 1638     Glucose 92 mg/dL      Comment: : [id number] Thalia Mccurdy ID: [id number]       Blood Culture - Blood, Arm, Right [[id number]]  (Abnormal) Collected: 06/16/21 1749    Specimen: Blood from Arm, Right Updated: 06/17/21 1503     Blood Culture Abnormal Stain     Gram Stain Aerobic Bottle Gram negative bacilli      Anaerobic Bottle Gram negative bacilli    Blood Culture ID, PCR - Blood, Arm, Right [[id number]]  (Abnormal) Collected: 06/16/21 1740    Specimen: Blood from Arm, Right Updated: 06/17/21 1340     BCID, PCR Pseudomonas aeruginosa. Identification by BCID PCR.     BOTTLE TYPE Anaerobic Bottle    Blood Culture - Blood, Arm, Right  [[id number]]  (Abnormal) Collected: 06/16/21 1740    Specimen: Blood from Arm, Right Updated: 06/17/21 1208     Blood Culture Abnormal Stain     Gram Stain Anaerobic Bottle Gram negative bacilli    Urine Culture - Urine, Urine, Catheter [[id number]] Collected: 06/16/21 1730    Specimen: Urine, Catheter Updated: 06/17/21 1142     Urine Culture >100,000 CFU/mL Mixed Jacquie Isolated    Narrative:      Specimen contains mixed organisms of questionable pathogenicity which indicates contamination with commensal jacquie.  Further identification is unlikely to provide clinically useful information.  Suggest recollection.    POC Glucose Once [[id number]]  (Normal) Collected: 06/17/21 1118    Specimen: Blood Updated: 06/17/21 1129     Glucose 96 mg/dL      Comment: : [id number] Thalia Mccurdy ID: [id number]       Troponin [[id number]]  (Abnormal) Collected: 06/17/21 1043    Specimen: Blood Updated: 06/17/21 1116     Troponin T 0.103 ng/mL     Narrative:      Troponin T Reference Range:  <= 0.03 ng/mL-   Negative for AMI  >0.03 ng/mL-     Abnormal for myocardial necrosis.  Clinicians would have to utilize clinical acumen, EKG, Troponin and serial changes to determine if it is an Acute Myocardial Infarction or myocardial injury due to an underlying chronic condition.       Results may be falsely decreased if patient taking Biotin.      Lactic Acid, Plasma [[id number]]  (Normal) Collected: 06/17/21 1043    Specimen: Blood Updated: 06/17/21 1108     Lactate 1.6 mmol/L     Manual Differential [[id number]]  (Abnormal) Collected: 06/17/21 0609    Specimen: Blood Updated: 06/17/21 0713     Neutrophil % 75.0 %      Lymphocyte % 3.0 %      Monocyte % 3.0 %      Bands %  12.0 %      Metamyelocyte % 5.0 %      Myelocyte % 2.0 %      Neutrophils Absolute 7.41 10*3/mm3      Lymphocytes Absolute 0.26 10*3/mm3      Monocytes Absolute 0.26 10*3/mm3      nRBC 1.0 /100 WBC      Anisocytosis Slight/1+     Polychromasia Large/3+     WBC  Morphology Normal     Platelet Estimate Decreased    CBC Auto Differential [[id number]]  (Abnormal) Collected: 06/17/21 0609    Specimen: Blood Updated: 06/17/21 0713     WBC 8.52 10*3/mm3      RBC 3.22 10*6/mm3      Hemoglobin 9.9 g/dL      Hematocrit 32.3 %      .3 fL      MCH 30.7 pg      MCHC 30.7 g/dL      RDW 17.5 %      RDW-SD 65.0 fl      MPV 9.6 fL      Platelets 130 10*3/mm3     Narrative:      The previously reported component NRBC is no longer being reported. Previous result was 0.2 /100 WBC (Reference Range: 0.0-0.2 /100 WBC) on 6/17/2021 at 0637 T.    POC Glucose Once [[id number]]  (Normal) Collected: 06/17/21 0641    Specimen: Blood Updated: 06/17/21 0653     Glucose 96 mg/dL      Comment: : [id number] Blake ToneyMeter ID: [id number]       Comprehensive Metabolic Panel [[id number]]  (Abnormal) Collected: 06/17/21 0609    Specimen: Blood Updated: 06/17/21 0651     Glucose 97 mg/dL      BUN 30 mg/dL      Creatinine 4.26 mg/dL      Sodium 135 mmol/L      Potassium 3.8 mmol/L      Chloride 94 mmol/L      CO2 28.0 mmol/L      Calcium 9.2 mg/dL      Total Protein 6.1 g/dL      Albumin 3.40 g/dL      ALT (SGPT) 18 U/L      AST (SGOT) 29 U/L      Alkaline Phosphatase 200 U/L      Total Bilirubin 0.8 mg/dL      eGFR Non African Amer 11 mL/min/1.73      Comment: <15 Indicative of kidney failure.        eGFR   Amer --     Comment: <15 Indicative of kidney failure.        Globulin 2.7 gm/dL      A/G Ratio 1.3 g/dL      BUN/Creatinine Ratio 7.0     Anion Gap 13.0 mmol/L     Narrative:      GFR Normal >60  Chronic Kidney Disease <60  Kidney Failure <15      Phosphorus [[id number]]  (Normal) Collected: 06/17/21 0609    Specimen: Blood Updated: 06/17/21 0651     Phosphorus 3.9 mg/dL     Magnesium [[id number]]  (Normal) Collected: 06/17/21 0609    Specimen: Blood Updated: 06/17/21 0646     Magnesium 1.9 mg/dL     TSH [[id number]]  (Normal) Collected: 06/16/21 1706    Specimen: Blood Updated: 06/17/21  0444     TSH 4.190 uIU/mL     T4, Free [[id number]]  (Normal) Collected: 06/16/21 1706    Specimen: Blood Updated: 06/17/21 0444     Free T4 0.99 ng/dL     Narrative:      Results may be falsely increased if patient taking Biotin.      Gastrointestinal Panel, PCR - Stool, Per Rectum [[id number]]  (Normal) Collected: 06/17/21 0212    Specimen: Stool from Per Rectum Updated: 06/17/21 0342     Campylobacter Not Detected     Plesiomonas shigelloides Not Detected     Salmonella Not Detected     Vibrio Not Detected     Vibrio cholerae Not Detected     Yersinia enterocolitica Not Detected     Enteroaggregative E. coli (EAEC) Not Detected     Enteropathogenic E. coli (EPEC) Not Detected     Enterotoxigenic E. coli (ETEC) lt/st Not Detected     Shiga-like toxin-producing E. coli (STEC) stx1/stx2 Not Detected     Shigella/Enteroinvasive E. coli (EIEC) Not Detected     Cryptosporidium Not Detected     Cyclospora cayetanensis Not Detected     Entamoeba histolytica Not Detected     Giardia lamblia Not Detected     Adenovirus F40/41 Not Detected     Astrovirus Not Detected     Norovirus GI/GII Not Detected     Rotavirus A Not Detected     Sapovirus (I, II, IV or V) Not Detected    Narrative:      If Aeromonas, Staphylococcus aureus or Bacillus cereus are suspected, please order [id number]: Stool Culture, Aeromonas, S aureus, B Cereus.    Clostridium Difficile Toxin - Stool, Per Rectum [[id number]]  (Abnormal) Collected: 06/17/21 0212    Specimen: Stool from Per Rectum Updated: 06/17/21 0322    Narrative:      The following orders were created for panel order Clostridium Difficile Toxin - Stool, Per Rectum.  Procedure                               Abnormality         Status                     ---------                               -----------         ------                     Clostridium Difficile To...[[id number]]  Abnormal            Final result                 Please view results for these tests on the individual orders.     Clostridium Difficile Toxin, PCR - Stool, Per Rectum [[id number]]  (Abnormal) Collected: 06/17/21 0212    Specimen: Stool from Per Rectum Updated: 06/17/21 0322     C. Difficile Toxins by PCR Positive    Narrative:      Performance characteristics of test not established for patients <2 years of age.    Timed Lactic Acid, Reflex [[id number]]  (Abnormal) Collected: 06/16/21 2050    Specimen: Blood Updated: 06/16/21 2119     Lactate 2.3 mmol/L     COVID-19,[facility] IN-HOUSE,Nasal Swab No Transport Media 3-4 HR TAT - Swab, Nasal Cavity [[id number]]  (Normal) Collected: 06/16/21 1813    Specimen: Swab from Nasal Cavity Updated: 06/16/21 1912     COVID19 Not Detected    Narrative:      Fact sheet for providers: https://www.fda.gov/media/461900/download     Fact sheet for patients: https://www.fda.gov/media/626068/download    Test performed by PCR.    Consider negative results in combination with clinical observations, patient history, and epidemiological information.    Manual Differential [[id number]]  (Abnormal) Collected: 06/16/21 1706    Specimen: Blood Updated: 06/16/21 1839     Neutrophil % 87.0 %      Lymphocyte % 4.0 %      Monocyte % 3.0 %      Bands %  6.0 %      Neutrophils Absolute 6.18 10*3/mm3      Lymphocytes Absolute 0.27 10*3/mm3      Monocytes Absolute 0.20 10*3/mm3      nRBC 2.0 /100 WBC      Anisocytosis Slight/1+     Macrocytes Slight/1+     Polychromasia Slight/1+     WBC Morphology Normal     Platelet Estimate Adequate    CBC & Differential [[id number]]  (Abnormal) Collected: 06/16/21 1706    Specimen: Blood Updated: 06/16/21 1839    Narrative:      The following orders were created for panel order CBC & Differential.  Procedure                               Abnormality         Status                     ---------                               -----------         ------                     CBC Auto Differential[[id number]]        Abnormal            Final result                 Please view results for  these tests on the individual orders.    CBC Auto Differential [[id number]]  (Abnormal) Collected: 06/16/21 1706    Specimen: Blood Updated: 06/16/21 1839     WBC 6.65 10*3/mm3      RBC 2.71 10*6/mm3      Hemoglobin 8.5 g/dL      Hematocrit 27.3 %      .7 fL      MCH 31.4 pg      MCHC 31.1 g/dL      RDW 17.7 %      RDW-SD 64.7 fl      MPV 9.5 fL      Platelets 145 10*3/mm3      nRBC 1.1 /100 WBC     Lactic Acid, Plasma [[id number]]  (Abnormal) Collected: 06/16/21 1749    Specimen: Blood Updated: 06/16/21 1818     Lactate 2.8 mmol/L     [place] Draw [[id number]] Collected: 06/16/21 1706    Specimen: Blood Updated: 06/16/21 1817    Narrative:      The following orders were created for panel order [place] Draw.  Procedure                               Abnormality         Status                     ---------                               -----------         ------                     Green Top (Gel)[[id number]]                                  Final result               Lavender Top[[id number]]                                     Final result               Red Top[[id number]]                                          Final result                 Please view results for these tests on the individual orders.    Green Top (Gel) [[id number]] Collected: 06/16/21 1706    Specimen: Blood Updated: 06/16/21 1817     Extra Tube Hold for add-ons.     Comment: Auto resulted.       Lavender Top [[id number]] Collected: 06/16/21 1706    Specimen: Blood Updated: 06/16/21 1817     Extra Tube hold for add-on     Comment: Auto resulted       Red Top [[id number]] Collected: 06/16/21 1706    Specimen: Blood Updated: 06/16/21 1817     Extra Tube Hold for add-ons.     Comment: Auto resulted.       Troponin [[id number]]  (Abnormal) Collected: 06/16/21 1706    Specimen: Blood Updated: 06/16/21 1751     Troponin T 0.136 ng/mL     Narrative:      Troponin T Reference Range:  <= 0.03 ng/mL-   Negative for AMI  >0.03 ng/mL-     Abnormal for myocardial  "necrosis.  Clinicians would have to utilize clinical acumen, EKG, Troponin and serial changes to determine if it is an Acute Myocardial Infarction or myocardial injury due to an underlying chronic condition.       Results may be falsely decreased if patient taking Biotin.      Protime-INR [[id number]]  (Abnormal) Collected: 06/16/21 1725    Specimen: Blood Updated: 06/16/21 1749     Protime 18.9 Seconds      INR 1.72    aPTT [[id number]]  (Abnormal) Collected: 06/16/21 1725    Specimen: Blood Updated: 06/16/21 1749     PTT 44.0 seconds     Procalcitonin [[id number]]  (Abnormal) Collected: 06/16/21 1706    Specimen: Blood Updated: 06/16/21 1745     Procalcitonin 12.46 ng/mL     Narrative:      As a Marker for Sepsis (Non-Neonates):     1. <0.5 ng/mL represents a low risk of severe sepsis and/or septic shock.  2. >2 ng/mL represents a high risk of severe sepsis and/or septic shock.    As a Marker for Lower Respiratory Tract Infections that require antibiotic therapy:  PCT on Admission     Antibiotic Therapy             6-12 Hrs later  >0.5                          Strongly Recommended            >0.25 - <0.5             Recommended  0.1 - 0.25                  Discouraged                       Remeasure/reassess PCT  <0.1                         Strongly Discouraged         Remeasure/reassess PCT      As 28 day mortality risk marker: \"Change in Procalcitonin Result\" (>80% or <=80%) if Day 0 (or Day 1) and Day 4 values are available. Refer to http://www.[facility]-pct-calculator.com/    Change in PCT <=80 %   A decrease of PCT levels below or equal to 80% defines a positive change in PCT test result representing a higher risk for 28-day all-cause mortality of patients diagnosed with severe sepsis or septic shock.    Change in PCT >80 %   A decrease of PCT levels of more than 80% defines a negative change in PCT result representing a lower risk for 28-day all-cause mortality of patients diagnosed with severe sepsis or septic " shock.                Comprehensive Metabolic Panel [[id number]]  (Abnormal) Collected: 06/16/21 1706    Specimen: Blood Updated: 06/16/21 1741     Glucose 113 mg/dL      BUN 25 mg/dL      Creatinine 4.06 mg/dL      Sodium 137 mmol/L      Potassium 3.1 mmol/L      Chloride 95 mmol/L      CO2 28.0 mmol/L      Calcium 9.1 mg/dL      Total Protein 5.9 g/dL      Albumin 3.50 g/dL      ALT (SGPT) 19 U/L      AST (SGOT) 36 U/L      Alkaline Phosphatase 199 U/L      Total Bilirubin 0.6 mg/dL      eGFR Non African Amer 11 mL/min/1.73      Comment: <15 Indicative of kidney failure.        eGFR   Amer --     Comment: <15 Indicative of kidney failure.        Globulin 2.4 gm/dL      A/G Ratio 1.5 g/dL      BUN/Creatinine Ratio 6.2     Anion Gap 14.0 mmol/L     Narrative:      GFR Normal >60  Chronic Kidney Disease <60  Kidney Failure <15      CK [[id number]]  (Normal) Collected: 06/16/21 1706    Specimen: Blood Updated: 06/16/21 1740     Creatine Kinase 37 U/L     Salicylate Level [[id number]]  (Normal) Collected: 06/16/21 1706    Specimen: Blood Updated: 06/16/21 1739     Salicylate <0.3 mg/dL     Acetaminophen Level [[id number]]  (Normal) Collected: 06/16/21 1706    Specimen: Blood Updated: 06/16/21 1739     Acetaminophen <5.0 mcg/mL     Phosphorus [[id number]]  (Normal) Collected: 06/16/21 1706    Specimen: Blood Updated: 06/16/21 1738     Phosphorus 2.5 mg/dL     Ethanol [[id number]] Collected: 06/16/21 1706    Specimen: Blood Updated: 06/16/21 1736     Ethanol % <0.010 %     Narrative:      Not for legal purposes. Chain of Custody not followed.     Magnesium [[id number]]  (Normal) Collected: 06/16/21 1706    Specimen: Blood Updated: 06/16/21 1735     Magnesium 1.9 mg/dL     Blood Gas, Arterial - [[id number]]  (Abnormal) Collected: 06/16/21 1723    Specimen: Arterial Blood Updated: 06/16/21 1723     Site Right Radial     Rustam's Test Positive     pH, Arterial 7.506 pH units      Comment: 83 Value above reference range         pCO2, Arterial 35.8 mm Hg      pO2, Arterial 54.0 mm Hg      Comment: 85 Value below critical limit        HCO3, Arterial 28.3 mmol/L      Comment: 83 Value above reference range        Base Excess, Arterial 5.0 mmol/L      Comment: 83 Value above reference range        O2 Saturation, Arterial 91.5 %      Comment: 84 Value below reference range        Temperature 37.0 C      Barometric Pressure for Blood Gas 750 mmHg      Modality Room Air     Ventilator Mode NA     Notified Who HOLLIE PAULSON ARNP     Notified By [id number]     Notified Time 06/16/2021 17:28     Collected by [id number]     Comment: Meter: [id number]     :  [id number]        pCO2, Temperature Corrected 35.8 mm Hg      pH, Temp Corrected 7.506 pH Units      pO2, Temperature Corrected 54.0 mm Hg         Imaging Results (Last 72 Hours)     Procedure Component Value Units Date/Time    XR Femur 2 View Right [[id number]] Collected: 06/16/21 1933     Updated: 06/16/21 1937    Narrative:      EXAMINATION: XR FEMUR 2 VW RIGHT-     6/16/2021 6:40 PM CDT     HISTORY: rt leg pain, fall     Right femur, 4 views.     No femur fracture is seen.     The hip and knee are normal, to the extent visualized.     No soft tissue abnormality is seen.       Impression:      1. No acute bony abnormality.              This report was finalized on 06/16/2021 19:34 by Dr. Dharmesh Zuleta MD.    XR Pelvis 1 or 2 View [[id number]] Collected: 06/16/21 1931     Updated: 06/16/21 1936    Narrative:      EXAMINATION: XR PELVIS 1 OR 2 VW-     6/16/2021 6:40 PM CDT     HISTORY: fall, pain     Pelvis, one view.     Mildly displaced superior and inferior right pubic ramus fractures are  not acute and are seen on a CT exam from 6/1/2021.     . SI joints are symmetric.     No sacral fracture is seen.     No hip fracture or dislocation.     Summary:  1. Old fractures of the right superior and inferior pubic ramus.  2. No acute fracture is seen.  This report was finalized on 06/16/2021  19:33 by Dr. Dharmesh Zuleta MD.    XR Hand 3+ View Right [[id number]] Collected: 06/16/21 1930     Updated: 06/16/21 1934    Narrative:      EXAMINATION: XR HAND 3+ VW RIGHT-     6/16/2021 6:40 PM CDT     HISTORY: Fall injury. Right hand pain.     Right hand, 3 views.     Osteopenia.     Chronic appearing small soft tissue calcifications are seen adjacent to  the third and fourth MCP joints.     Metacarpals and fingers show no sign of acute fracture.     The distal aspect of the second finger and third finger are obscured by  a Sensor and wire on some of the images.     Carpal bones align normally.     Summary:  1. No acute fracture is seen.     This report was finalized on 06/16/2021 19:31 by Dr. Dharmesh Zuleta MD.    XR Wrist 3+ View Right [[id number]] Collected: 06/16/21 1929     Updated: 06/16/21 1932    Narrative:      EXAMINATION: XR WRIST 3+ VW RIGHT-     6/16/2021 6:40 PM CDT     HISTORY: Fall injury. Right wrist pain.     Right wrist, 3 views.     Osteopenia.     Diffuse arterial calcification.     Intact distal radius and ulna.     Carpal bones align normally.     Proximal metacarpals are intact.     Summary:  1. No acute fracture.     This report was finalized on 06/16/2021 19:29 by Dr. Dharmesh Zuleta MD.    XR Chest 1 View [[id number]] Collected: 06/16/21 1927     Updated: 06/16/21 1932    Narrative:      EXAMINATION: XR CHEST 1 VW-     6/16/2021 6:40 PM CDT     HISTORY: fever, AMS     1 view chest x-ray compared with 6/7/2021.     Stable heart and mediastinum.  Aortic arch calcification.     Interval removal of a right jugular central line.     There is increased interstitial prominence compatible with interstitial  edema and early heart failure.     There is an unchanged appearance of a moderate right pleural effusion.     Nonhealed fracture of the right humeral neck.  This finding was demonstrated on a radiograph from 4/1/2021.     Summary:  1. Increased interstitial prominence compatible with interstitial  edema  and early failure.  2. Unchanged or minimally decreased moderate right pleural effusion.  This report was finalized on 06/16/2021 19:28 by Dr. Dharmesh Zuleta MD.    CT Cervical Spine Without Contrast [[id number]] Collected: 06/16/21 1827     Updated: 06/16/21 1832    Narrative:      EXAMINATION: CT CERVICAL SPINE WO CONTRAST-      6/16/2021 6:13 PM CDT     HISTORY: Fall injury. Neck pain. Altered mental status.     In order to have a CT radiation dose as low as reasonably achievable  Automated Exposure Control was utilized for adjustment of the mA and/or  KV according to patient size.     DLP in mGycm= 593.     Noncontrast cervical spine CT.     Comparison is made with cervical spine CT imaging from 3/26/2021.     The patient was unable to cooperate and the images are affected by  motion.     Mild right convex curvature on the coronal sequence is negative and  probably is positional.     The lateral view shows appropriate lordotic curvature with no vertebral  body malalignment.     Prevertebral soft tissues are normal.     Facet joints align normally.     Summary:  1. No acute fracture is seen.                                   This report was finalized on 06/16/2021 18:29 by Dr. Dharmesh Zuleta MD.    CT Head Without Contrast [[id number]] Collected: 06/16/21 1826     Updated: 06/16/21 1830    Narrative:      EXAMINATION: CT HEAD WO CONTRAST-      6/16/2021 6:13 PM CDT     HISTORY: Altered mental status.     In order to have a CT radiation dose as low as reasonably achievable  Automated Exposure Control was utilized for adjustment of the mA and/or  KV according to patient size.     DLP in mGycm= 594.     Noncontrast head CT.     Comparison is made with a noncontrast head CT from 6/1/2021.     Axial, sagittal, and coronal noncontrast CT imaging of the head.     The visualized paranasal sinuses are clear.     The brain and ventricles have an age appropriate appearance.   There is no hemorrhage or mass-effect.   No  acute infarction is seen.     No calvarial abnormality.       Impression:      1. No acute intracranial abnormality is seen.  2. Stable chronic change.                                         This report was finalized on 06/16/2021 18:27 by Dr. Dharmesh Zuleta MD.             ASSESSMENT/PLAN       Examination and evaluation of wound(s) was performed.    DIAGNOSIS:   At risk for skin breakdown  History of pressure injuries  Bowel and Bladder Incontinence   Impaired mobility and ADLs  Diabetes mellitus, type 2  C. Difficile diarrhea      PLAN:     Start     Ordered    06/18/21 1154  Elevate Heels Off of Bed  Until Discontinued      06/18/21 1153    06/18/21 1154  Turn Patient  Now Then Every 2 Hours      06/18/21 1153    06/18/21 1154  Use Repositioning Wedge to Position Patient  Continuous      06/18/21 1153    06/18/21 1154  Use Seat Cushion When Up In Chair  Continuous      06/18/21 1153    Unscheduled  Apply Moisture Barrier After Any Incontinence  As Needed     Comments: Hydraguard   Question:  Wound Care Instructions  Answer:  Apply Moisture Barrier After Any Incontinence    06/18/21 1153               Discussed findings and treatment plan including risks, benefits, and treatment options with patient in detail. Patient agreed with treatment plan.      This document has been electronically signed by ANTHONY Saab on 6/18/2021 10:06 CDT

## 2021-06-18 NOTE — THERAPY EVALUATION
Patient Name: Mini Gentile  : 1960    MRN: [id number]                              Today's Date: 2021       Admit Date: 2021    Visit Dx:     ICD-10-CM ICD-9-CM   1. Altered mental status, unspecified altered mental status type  R41.82 780.97   2. Hypoxia  R09.02 799.02   3. Elevated troponin  R77.8 790.6   4. End stage renal disease (CMS/[facility])  N18.6 585.6   5. Anemia, unspecified type  D64.9 285.9   6. Hypotension, unspecified hypotension type  I95.9 458.9   7. Fall, initial encounter  W19.XXXA E888.9   8. Dysphagia, unspecified type  R13.10 787.20   9. 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   • Eustachian tube dysfunction   • Chronic otitis media   • Pneumonia of left upper lobe due to Streptococcus (CMS/[facility])   • End stage renal failure on dialysis (CMS/[facility])   • Diabetes (CMS/[facility])   • Glaucoma   • HTN (hypertension)   • Proteinuria   • DM2 (diabetes mellitus, type 2) (CMS/[facility])   • Chronic anemia   • Acute pulmonary edema (CMS/[facility])   • Non-compliance with renal dialysis (CMS/[facility])   • Elevated troponin due to ESRD    • Hyperkalemia   • Encephalopathy, metabolic, due to uremia   • High anion gap metabolic acidosis due to uremia   • Respiratory distress   • Acute diastolic heart failure (CMS/[facility])   • Lymph node enlargement, left axillary measuring 1.1 cm -follow-up for primary care   • Nausea vomiting and diarrhea   • Acute diastolic heart failure (CMS/[facility])   • Diabetes mellitus with ophthalmic complication (CMS/[facility])   • Tobacco abuse   • Urinary tract infection with hematuria   • Pneumonia due to infectious organism   • Abnormal urine findings   • Closed fracture of right proximal humerus   • Hyponatremia   • Hypothyroidism (acquired)   • Forehead laceration secondary to fall   • Heme positive stool   • Dialysis AV fistula malfunction, initial encounter (CMS/[facility])   • Aspiration  into airway   • Bronchospasm, acute   • Altered mental status   • Protein-calorie malnutrition (CMS/[facility])   • Pupil irregular   • Sepsis (CMS/[facility])   • Hypoglycemia   • Hyponatremia   • Diarrhea   • Hypokalemia   • C. 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    • Chronic otitis media    • Diabetes (CMS/[facility])    • End stage renal disease on dialysis (CMS/[facility])    • Eustachian tube dysfunction    • GERD (gastroesophageal reflux disease)    • Glaucoma    • HTN (hypertension)    • Hyperlipidemia    • Mucoid otitis media    • Noncompliance of patient with renal dialysis (CMS/[facility])    • Tobacco abuse      Past Surgical History:   Procedure Laterality Date   • ARTERIOVENOUS FISTULA     • CATARACT EXTRACTION WITH INTRAOCULAR LENS IMPLANT     •  SECTION     • CHOLECYSTECTOMY     • MYRINGOTOMY W/ TUBES Bilateral    • MYRINGOTOMY W/ TUBES Right    • TUBAL ABDOMINAL LIGATION       General Information     [facility] Name 21 1011          OT Time and Intention    Document Type  evaluation Pt presented to ED from NH with AMS and falls. Pt with multiple recent admits. R humeral neck fx from , NWB R UE in sling. Dx: hypoxia, elevated troponin.  -     Mode of Treatment  occupational therapy  -     Row Name 21 1011          General Information    Patient Profile Reviewed  yes  -     Prior Level of Function  independent:;all household mobility;transfer;ADL's per pt report  -     Existing Precautions/Restrictions  fall;shoulder;non-weight bearing;right  -     Row Name 21 1011          Living Environment    Lives With  facility resident  -[facility] Name 21 1011          Cognition    Orientation Status (Cognition)  oriented to;person;place knew year  -     Row Name 21 1011          Safety Issues, Functional Mobility    Impairments Affecting Function (Mobility)  balance;endurance/activity  tolerance;strength;range of motion (ROM)  -       User Key  (r) = Recorded By, (t) = Taken By, (c) = Cosigned By    Initials Name Provider Type    Kami Garduno OTR/L Occupational Therapist          Mobility/ADL's     Row Name 06/18/21 1011          Bed Mobility    Bed Mobility  supine-sit  -     Supine-Sit [place] (Bed Mobility)  minimum assist (75% patient effort);verbal cues  -     Bed Mobility, Safety Issues  decreased use of arms for pushing/pulling  -     Assistive Device (Bed Mobility)  head of bed elevated;bed rails  -     Row Name 06/18/21 1011          Transfers    Transfers  sit-stand transfer;bed-chair transfer  -     Bed-Chair [place] (Transfers)  minimum assist (75% patient effort);verbal cues  -     Sit-Stand [place] (Transfers)  minimum assist (75% patient effort);verbal cues  -     Row Name 06/18/21 1011          Functional Mobility    Functional Mobility- Ind. Level  contact guard assist;minimum assist (75% patient effort);verbal cues required  -     Functional Mobility- Comment  HHAx1. Pt able to amb around bed to chair.  -     Row Name 06/18/21 1011          Activities of Daily Living    BADL Assessment/Intervention  lower body dressing  -     Row Name 06/18/21 1011          Lower Body Dressing Assessment/Training    [place] Level (Lower Body Dressing)  don;socks;moderate assist (50% patient effort)  -     Position (Lower Body Dressing)  edge of bed sitting  -       User Key  (r) = Recorded By, (t) = Taken By, (c) = Cosigned By    Initials Name Provider Type    Kami Garduno OTR/L Occupational Therapist        Obj/Interventions     Row Name 06/18/21 1011          Sensory Assessment (Somatosensory)    Sensory Assessment (Somatosensory)  UE sensation intact  -     Row Name 06/18/21 1011          Range of Motion Comprehensive    General Range of Motion  bilateral upper extremity ROM WFL  -     Comment, General Range of Motion   except for R shoulder flexion impaired approx 50% d/t pain from deformity caused by fx.  -     Row Name 06/18/21 1011          Balance    Balance Assessment  sitting static balance;standing static balance  -JJ     Static Sitting Balance  WFL;unsupported;sitting, edge of bed  -JJ     Static Standing Balance  mild impairment;supported;standing  -JJ       User Key  (r) = Recorded By, (t) = Taken By, (c) = Cosigned By    Initials Name Provider Type    Kami Garduno, OTR/L Occupational Therapist        Goals/Plan     [facility] Name 06/18/21 1011          Transfer Goal 1 (OT)    Activity/Assistive Device (Transfer Goal 1, OT)  shower chair;toilet  -JJ     [place] Level/Cues Needed (Transfer Goal 1, OT)  standby assist  -JJ     Time Frame (Transfer Goal 1, OT)  long term goal (LTG);by discharge  -JJ     Progress/Outcome (Transfer Goal 1, OT)  goal ongoing  -[facility] Name 06/18/21 1011          Dressing Goal 1 (OT)    Activity/Device (Dressing Goal 1, OT)  upper body dressing  -JJ     [place]/Cues Needed (Dressing Goal 1, OT)  independent  -JJ     Time Frame (Dressing Goal 1, OT)  long term goal (LTG);by discharge  -[facility] Name 06/18/21 1011          Toileting Goal 1 (OT)    Activity/Device (Toileting Goal 1, OT)  toileting skills, all  -JJ     [place] Level/Cues Needed (Toileting Goal 1, OT)  independent  -JJ     Time Frame (Toileting Goal 1, OT)  long term goal (LTG);by discharge  -JJ     Progress/Outcome (Toileting Goal 1, OT)  goal ongoing  -[facility] Name 06/18/21 1011          Therapy Assessment/Plan (OT)    Planned Therapy Interventions (OT)  activity tolerance training;adaptive equipment training;BADL retraining;occupation/activity based interventions;patient/caregiver education/training;transfer/mobility retraining;strengthening exercise;functional balance retraining  -J       User Key  (r) = Recorded By, (t) = Taken By, (c) = Cosigned By    Initials Name Provider Type    TSERING Chapman  Kami OTR/L Occupational Therapist        Clinical Impression     Row Name 06/18/21 1011          Pain Assessment    Additional Documentation  Pain Scale: FACES Pre/Post-Treatment (Group)  -[facility] Name 06/18/21 1011          Pain Scale: Numbers Pre/Post-Treatment    Pain Intervention(s)  Medication (See MAR);Repositioned;Ambulation/increased activity  -     Row Name 06/18/21 1011          Pain Scale: FACES Pre/Post-Treatment    Pain: FACES Scale, Pretreatment  2-->hurts little bit  -     Posttreatment Pain Rating  2-->hurts little bit  -     Row Name 06/18/21 1011          Plan of Care Review    Plan of Care Reviewed With  patient  -J     Progress  improving  -     Outcome Summary  OT eval completed. Pt is alert and oriented to person, place and year. She reports continued pain in R shoulder from R hurmeral neck fx on 4/4/21, at that time she was NWB in sling. Unsure of current WB'ing status of R UE, will continue to proceed with NWB d/t pain until otherwise cleared by MD. She demonstrated decreased strength, balance, activity tolerance and ROM. Min A for bed mobility. Mod A for LBD. Min A for sit <> stand t/f from EOB and in room mobility. She would benefit from skilled OT services to address these deficits. Recommend d/c SNF, pending pt progress.  -     Row Name 06/18/21 1011          Therapy Assessment/Plan (OT)    Patient/Family Therapy Goal Statement (OT)  return home  -     Rehab Potential (OT)  good, to achieve stated therapy goals  -     Criteria for Skilled Therapeutic Interventions Met (OT)  yes;skilled treatment is necessary  -     Therapy Frequency (OT)  5 times/wk  -     Predicted Duration of Therapy Intervention (OT)  10 days  -     Row Name 06/18/21 1011          Therapy Plan Review/Discharge Plan (OT)    Anticipated Discharge Disposition (OT)  skilled nursing facility  -[facility] Name 06/18/21 1011          Vital Signs    Pre Patient Position  Supine  -     Intra  Patient Position  Standing  -JJ     Post Patient Position  Sitting  -JJ     Row Name 06/18/21 1011          Positioning and Restraints    Pre-Treatment Position  in bed  -JJ     Post Treatment Position  chair  -JJ     In Chair  notified nsg;reclined;call light within reach;encouraged to call for assist;patient within staff view  -JJ       User Key  (r) = Recorded By, (t) = Taken By, (c) = Cosigned By    Initials Name Provider Type    Kami Garduno OTR/L Occupational Therapist        Outcome Measures     Row Name 06/18/21 1011          How much help from another is currently needed...    Putting on and taking off regular lower body clothing?  2  -JJ     Bathing (including washing, rinsing, and drying)  2  -JJ     Toileting (which includes using toilet bed pan or urinal)  3  -JJ     Putting on and taking off regular upper body clothing  3  -JJ     Taking care of personal grooming (such as brushing teeth)  4  -JJ     Eating meals  4  -JJ     AM-PAC 6 Clicks Score (OT)  18  -     Row Name 06/18/21 1011          Functional Assessment    Outcome Measure Options  AM-PAC 6 Clicks Daily Activity (OT)  -       User Key  (r) = Recorded By, (t) = Taken By, (c) = Cosigned By    Initials Name Provider Type    Kami Garduno OTR/L Occupational Therapist        Occupational Therapy Education                 Title: PT OT SLP Therapies (In Progress)     Topic: Occupational Therapy (In Progress)     Point: ADL training (Done)     Description:   Instruct learner(s) on proper safety adaptation and remediation techniques during self care or transfers.   Instruct in proper use of assistive devices.              Learning Progress Summary           Patient Acceptance, E, VU by TSERING at 6/18/2021 1104                   Point: Home exercise program (Not Started)     Description:   Instruct learner(s) on appropriate technique for monitoring, assisting and/or progressing therapeutic exercises/activities.              Learner  Progress:  Not documented in this visit.          Point: Precautions (Done)     Description:   Instruct learner(s) on prescribed precautions during self-care and functional transfers.              Learning Progress Summary           Patient Acceptance, E, VU by  at 6/18/2021 1104                   Point: Body mechanics (Done)     Description:   Instruct learner(s) on proper positioning and spine alignment during self-care, functional mobility activities and/or exercises.              Learning Progress Summary           Patient Acceptance, E, VU by  at 6/18/2021 1104                               User Key     Initials Effective Dates Name Provider Type Discipline     06/16/21 -  Kami Chapman OTR/L Occupational Therapist OT              OT Recommendation and Plan  Planned Therapy Interventions (OT): activity tolerance training, adaptive equipment training, BADL retraining, occupation/activity based interventions, patient/caregiver education/training, transfer/mobility retraining, strengthening exercise, functional balance retraining  Therapy Frequency (OT): 5 times/wk  Plan of Care Review  Plan of Care Reviewed With: patient  Progress: improving  Outcome Summary: OT eval completed. Pt is alert and oriented to person, place and year. She reports continued pain in R shoulder from R hurmeral neck fx on 4/4/21, at that time she was NWB in sling. Unsure of current WB'ing status of R UE, will continue to proceed with NWB d/t pain until otherwise cleared by MD. She demonstrated decreased strength, balance, activity tolerance and ROM. Min A for bed mobility. Mod A for LBD. Min A for sit <> stand t/f from EOB and in room mobility. She would benefit from skilled OT services to address these deficits. Recommend d/c SNF, pending pt progress.     Time Calculation:   Time Calculation- OT     Row Name 06/18/21 1104             Time Calculation- OT    OT Start Time  1011  -J      OT Stop Time  1051  -JJ      OT Time  Calculation (min)  40 min  -TSERING      OT Received On  06/18/21  -TSERING      OT Goal Re-Cert Due Date  06/28/21  -TSERING        User Key  (r) = Recorded By, (t) = Taken By, (c) = Cosigned By    Initials Name Provider Type    Kami Garduno OTR/L Occupational Therapist        Therapy Charges for Today     Code Description Service Date Service Provider Modifiers Qty    [id number] HC OT EVAL MOD COMPLEXITY 3 6/18/2021 Kami Chapman OTR/L GO 1               RADHA Thomas/GABY  6/18/2021

## 2021-06-18 NOTE — PLAN OF CARE
Goal Outcome Evaluation:  Plan of Care Reviewed With: patient        Progress: no change  Outcome Summary: iid. l arm fistula patent. alert, but confused at times. up with assist x1. denies pain. tolerated diet. no acute distress noted. cont to monitor.

## 2021-06-18 NOTE — PROGRESS NOTES
"Pharmacy Renal Dose Conversion    Mini Gentile is a 61 y.o. year old female  147.3 cm (58\") 67.8 kg (149 lb 7.6 oz)    Estimated Creatinine Clearance: 21 mL/min (A) (by C-G formula based on SCr of 2.4 mg/dL (H)).   Creatinine   Date Value Ref Range Status   06/18/2021 2.40 (H) 0.57 - 1.00 mg/dL Final   06/17/2021 4.26 (H) 0.57 - 1.00 mg/dL Final   06/16/2021 4.06 (H) 0.57 - 1.00 mg/dL Final   02/19/2021 7.2 (H) 0.5 - 0.9 mg/dL Final   10/10/2020 7.2 (H) 0.5 - 0.9 mg/dL Final   09/01/2020 5.1 (H) 0.5 - 0.9 mg/dL Final       PLAN  Based on prescribing information provided by the drug , Famotidine 20mg po daily,  has been changed to Famotidine 20mg po QTuThSat at 1700 (post HD).    Adjusted per the directives and guidelines established by [facility] and Therapeutics Committee and [facility] Medical Executive Committee.  Pharmacy will continue to monitor daily and make further adjustment(s) accordingly.    Vasquez Gasca, PharmD  06/18/2110:30 CDT    "

## 2021-06-18 NOTE — PLAN OF CARE
Goal Outcome Evaluation:  Plan of Care Reviewed With: patient           Outcome Summary: PT eval complete. She presents alert and oriented x 3. She required min assist to stand and ambulate within her room and to the bedside chair. She demos weakness and decreased activity tolerance. PT will cont to progress gait, balance, and strength. Anticipate she will return to SNF.

## 2021-06-18 NOTE — PROGRESS NOTES
Nephrology ([facility]) Progress Note      Patient:  Mini Gentile  YOB: 1960  Date of Service: 6/18/2021  MRN: [id number]   Acct: [id number]   Primary Care Physician: Bharati Dahl APRN  Advance Directive:   Code Status and Medical Interventions:   Ordered at: 06/17/21 0220     Code Status:    CPR     Medical Interventions (Level of Support Prior to Arrest):    Full     Admit Date: 6/16/2021       Hospital Day: 2  Referring Provider: Tejinder Kent MD      Patient personally seen and examined.  Complete chart including Consults, Notes, Operative Reports, Labs, Cardiology, and Radiology studies reviewed as able.        Subjective:  Mini Gentile is a 61 y.o. female  whom we were consulted for end stage renal disease. Maintenance hemodialysis on Tuesday Thursday Saturday at [facility]. Fairly noncompliant with dialysis treatments. She was just discharged from [facility] on 6/08. Unknown if she has been going to HD treatments since then.  Presented to ER after she was noted to be confused at nursing home.  Complaints of diarrhea recently, and stool has tested positive for C Diff. Patient initially minimally responsive but has improved after HD on 6/17.     Today is more awake, answers questions appropriately. No new overnight issues    Allergies:  Bupropion, Chantix [varenicline], Gabapentin, Azithromycin, Levofloxacin, Penicillins, and Sulfa antibiotics    Home Meds:  Medications Prior to Admission   Medication Sig Dispense Refill Last Dose   • aspirin 81 MG EC tablet Take 81 mg by mouth Daily.   6/16/2021 at Unknown time   • atropine 1 % ophthalmic solution Administer 1 drop into the left eye Daily.   6/16/2021 at Unknown time   • brimonidine (ALPHAGAN) 0.2 % ophthalmic solution Administer 1 drop into the left eye 2 (two) times a day.   6/16/2021 at Unknown time   • carvedilol (COREG) 6.25 MG tablet Take 6.25 mg by mouth 2 (Two) Times a Day With  Meals.   6/16/2021 at Unknown time   • cholecalciferol (VITAMIN D3) 1000 units tablet Take 2,000 Units by mouth Daily.   6/16/2021 at Unknown time   • dorzolamide (TRUSOPT) 2 % ophthalmic solution Administer 1 drop into the left eye 2 (Two) Times a Day.   6/16/2021 at Unknown time   • epoetin vika-epbx (RETACRIT) 21516 UNIT/ML injection Inject 1 mL under the skin into the appropriate area as directed 3 (Three) Times a Week. Indications: ESRD on Dialysis 6.6 mL  6/16/2021 at Unknown time   • famotidine (PEPCID) 20 MG tablet Take 1 tablet by mouth Daily.   6/16/2021 at Unknown time   • Ferric Citrate 1  MG(Fe) tablet Take 1 tablet by mouth 3 (Three) Times a Day With Meals.   6/16/2021 at Unknown time   • fluticasone (FLONASE) 50 MCG/ACT nasal spray 2 sprays into the nostril(s) as directed by provider 2 (Two) Times a Day.   6/16/2021 at Unknown time   • insulin lispro (humaLOG) 100 UNIT/ML injection Inject under the skin TID AC as per sliding scale:  200-249= 2 units  250-299= 4 units  300-349= 6 units  350-399= 8 units  400-500= 10 units and call MD   6/16/2021 at Unknown time   • metoclopramide (REGLAN) 5 MG tablet Take 1 tablet by mouth 4 (Four) Times a Day Before Meals & at Bedtime.   6/16/2021 at Unknown time   • rosuvastatin (CRESTOR) 5 MG tablet Take 5 mg by mouth every night at bedtime.   6/15/2021 at Unknown time   • saccharomyces boulardii (FLORASTOR) 250 MG capsule Take 250 mg by mouth Daily.   6/16/2021 at Unknown time   • sertraline (ZOLOFT) 25 MG tablet Take 1 tablet by mouth Daily.   6/16/2021 at Unknown time   • timolol (TIMOPTIC) 0.5 % ophthalmic solution Administer 1 drop into the left eye Every Morning.  12 6/16/2021 at Unknown time   • acetaminophen (TYLENOL) 325 MG tablet Take 650 mg by mouth Every 4 (Four) Hours As Needed for Mild Pain  or Fever.   Unknown at Unknown time   • albuterol sulfate  (90 Base) MCG/ACT inhaler Inhale 2 puffs Every 4 (Four) Hours As Needed for Wheezing. 18 g  0 Unknown at Unknown time   • ipratropium-albuterol (DUO-NEB) 0.5-2.5 mg/3 ml nebulizer Take 3 mL by nebulization Every 4 (Four) Hours As Needed for Wheezing or Shortness of Air.   Unknown at Unknown time   • ondansetron (ZOFRAN) 4 MG tablet Take 1 tablet by mouth Every 6 (Six) Hours As Needed for Nausea or Vomiting. 24 tablet 2 Unknown at Unknown time       Medicines:  Current Facility-Administered Medications   Medication Dose Route Frequency Provider Last Rate Last Admin   • acetaminophen (TYLENOL) tablet 650 mg  650 mg Oral Q4H PRN Tejinder Kent MD   650 mg at 06/17/21 2032    Or   • acetaminophen (TYLENOL) suppository 650 mg  650 mg Rectal Q4H PRN Tejinder Kent MD       • albumin human 25 % IV SOLN 12.5 g  12.5 g Intravenous PRN Adi Gonzalez MD       • aspirin EC tablet 81 mg  81 mg Oral Daily Tejinder Kent MD   81 mg at 06/17/21 1507   • atropine 1 % ophthalmic solution 1 drop  1 drop Left Eye Daily Tejinder Kent MD   1 drop at 06/17/21 1120   • brimonidine (ALPHAGAN) 0.2 % ophthalmic solution 1 drop  1 drop Left Eye BID PRN Tejinder Kent MD   1 drop at 06/17/21 1505   • cefepime (MAXIPIME) 1 g/100 mL 0.9% NS IVPB (mbp)  1 g Intravenous Once Bin Guy DO       • [START ON 6/19/2021] cefepime (MAXIPIME) 500 mg in sodium chloride 0.9 % 100 mL IVPB  500 mg Intravenous Q24H Bin Guy DO       • cholecalciferol (VITAMIN D3) tablet 2,000 Units  2,000 Units Oral Daily Tejinder Kent MD   2,000 Units at 06/17/21 1507   • dextrose (D50W) 25 g/ 50mL Intravenous Solution 25 g  25 g Intravenous Q15 Min PRN Tejinder Kent MD       • dextrose (GLUTOSE) oral gel 15 g  15 g Oral Q15 Min PRN Tejinder Kent MD       • epoetin vika-epbx (RETACRIT) injection 10,000 Units  10,000 Units Subcutaneous Once per day on Tue Thu Sat Tejinder Kent MD   10,000 Units at 06/17/21 1120   • famotidine (PEPCID) tablet 20 mg  20 mg Oral Daily Tejinder Kent MD   20 mg at  06/17/21 1507   • Ferric Citrate tablet 840 mg  4 tablet Oral TID With Meals Tejinder Kent MD       • fluticasone (FLONASE) 50 MCG/ACT nasal spray 2 spray  2 spray Nasal BID Tejinder Kent MD   2 spray at 06/17/21 2034   • glucagon (human recombinant) (GLUCAGEN DIAGNOSTIC) injection 1 mg  1 mg Subcutaneous Q15 Min PRN Tejinder Kent MD       • insulin lispro (humaLOG) injection 2-7 Units  2-7 Units Subcutaneous TID AC Tejinder Kent MD       • ipratropium-albuterol (DUO-NEB) nebulizer solution 3 mL  3 mL Nebulization Q4H PRN Tejinder Kent MD       • lactated ringers infusion  50 mL/hr Intravenous Continuous Tejinder Kent MD 50 mL/hr at 06/18/21 0120 50 mL/hr at 06/18/21 0120   • lactobacillus acidophilus (RISAQUAD) capsule 1 capsule  1 capsule Oral Daily Tejinder Kent MD   1 capsule at 06/17/21 1507   • ondansetron (ZOFRAN) tablet 4 mg  4 mg Oral Q6H PRN Tejinder Kent MD        Or   • ondansetron (ZOFRAN) injection 4 mg  4 mg Intravenous Q6H PRN Tejinder Kent MD       • pravastatin (PRAVACHOL) tablet 10 mg  10 mg Oral Nightly Tejinder Kent MD   10 mg at 06/17/21 2032   • sertraline (ZOLOFT) tablet 25 mg  25 mg Oral Daily Tejinder Kent MD   25 mg at 06/17/21 1507   • sodium chloride 0.9 % bolus 100 mL  100 mL Intravenous PRN Adi Gonzalez MD       • sodium chloride 0.9 % flush 10 mL  10 mL Intravenous PRN Tejinder Kent MD       • sodium chloride 0.9 % flush 10 mL  10 mL Intravenous Q12H Tejinder Kent MD   10 mL at 06/17/21 2033   • sodium chloride 0.9 % flush 10 mL  10 mL Intravenous PRN Tejinder Kent MD       • timolol (TIMOPTIC) 0.5 % ophthalmic solution 1 drop  1 drop Left Eye Daily Tejinder Kent MD   1 drop at 06/17/21 1120   • vancomycin oral solution reconstituted 125 mg  125 mg Oral Q6H Bin Guy DO   125 mg at 06/18/21 0537       Past Medical History:  Past Medical History:   Diagnosis Date   • 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    • Chronic otitis media    • Diabetes (CMS/[facility])    • End stage renal disease on dialysis (CMS/[facility])    • Eustachian tube dysfunction    • GERD (gastroesophageal reflux disease)    • Glaucoma    • HTN (hypertension)    • Hyperlipidemia    • Mucoid otitis media    • Noncompliance of patient with renal dialysis (CMS/[facility])    • Tobacco abuse        Past Surgical History:  Past Surgical History:   Procedure Laterality Date   • ARTERIOVENOUS FISTULA     • CATARACT EXTRACTION WITH INTRAOCULAR LENS IMPLANT     •  SECTION     • CHOLECYSTECTOMY     • MYRINGOTOMY W/ TUBES Bilateral    • MYRINGOTOMY W/ TUBES Right    • TUBAL ABDOMINAL LIGATION         Family History  Family History   Problem Relation Age of Onset   • Heart disease Mother    • Diabetes Father    • Colon cancer Neg Hx    • Colon polyps Neg Hx        Social History  Social History     Socioeconomic History   • Marital status: Single     Spouse name: Not on file   • Number of children: Not on file   • Years of education: Not on file   • Highest education level: Not on file   Tobacco Use   • Smoking status: Current Every Day Smoker     Packs/day: 2.00     Years: 6.00     Pack years: 12.00     Types: Cigarettes   • Smokeless tobacco: Never Used   Vaping Use   • Vaping Use: Never used   Substance and Sexual Activity   • Alcohol use: No   • Drug use: No   • Sexual activity: Defer         Review of Systems:  History obtained from chart review and the patient  General ROS: No fever or chills  Respiratory ROS: No cough, shortness of breath, wheezing  Cardiovascular ROS: No chest pain or palpitations  Gastrointestinal ROS: No abdominal pain or melena  Genito-Urinary ROS: No dysuria or hematuria    Objective:  Patient Vitals for the past 24 hrs:   BP Temp Temp src Pulse Resp SpO2 Weight   21 0700 127/58 -- -- 76 14 98 % --   21 0600 127/56 -- -- 81 14 (!) 76 % --   21 0500 108/44 -- -- 74 -- 100 % --   21 0400  98/43 98.8 °F (37.1 °C) Oral 74 15 94 % --   06/18/21 0300 113/50 -- -- 76 -- 96 % 67.8 kg (149 lb 7.6 oz)   06/18/21 0200 111/50 -- -- 77 16 98 % --   06/18/21 0100 107/49 -- -- 76 14 94 % --   06/18/21 0000 112/47 98.6 °F (37 °C) Oral 80 14 93 % --   06/17/21 2300 -- -- -- 77 -- 91 % --   06/17/21 2200 107/49 -- -- 80 16 97 % --   06/17/21 2100 106/53 -- -- 78 14 95 % --   06/17/21 2000 98/42 98.3 °F (36.8 °C) Oral 81 16 93 % --   06/17/21 1900 115/74 -- -- 89 -- 94 % --   06/17/21 1830 125/67 -- -- 87 -- 92 % --   06/17/21 1800 113/44 -- -- 82 -- -- --   06/17/21 1745 99/55 -- -- -- -- -- --   06/17/21 1730 (!) 81/43 -- -- 75 -- 92 % --   06/17/21 1715 (!) 75/65 -- -- 75 -- 94 % --   06/17/21 1700 (!) 76/48 -- -- 72 -- 94 % --   06/17/21 1630 99/40 -- -- 82 -- 94 % --   06/17/21 1600 100/41 98.1 °F (36.7 °C) Axillary 82 16 93 % --   06/17/21 1530 110/78 -- -- 91 -- 99 % --   06/17/21 1500 122/61 -- -- 90 -- 93 % --   06/17/21 1430 130/54 -- -- 76 -- 100 % --   06/17/21 1400 106/78 -- -- 85 -- 94 % --   06/17/21 1330 99/57 -- -- 73 -- 96 % --   06/17/21 1300 (!) 89/47 -- -- 68 -- 94 % --   06/17/21 1230 111/61 -- -- 73 -- 97 % --   06/17/21 1200 107/50 -- -- 72 -- 96 % --   06/17/21 1145 -- 97 °F (36.1 °C) Axillary 83 14 95 % --   06/17/21 1130 115/98 -- -- 77 -- 95 % --   06/17/21 1100 113/55 97.1 °F (36.2 °C) -- 73 -- 95 % --   06/17/21 1030 126/56 -- -- 76 -- 96 % --   06/17/21 1000 139/56 -- -- 73 -- 93 % --   06/17/21 0930 108/53 -- -- 71 -- 96 % --       Intake/Output Summary (Last 24 hours) at 6/18/2021 0909  Last data filed at 6/18/2021 0300  Gross per 24 hour   Intake 1281.83 ml   Output 2300 ml   Net -1018.17 ml     General: awake/alert   Chest:  clear to auscultation bilaterally without respiratory distress  CVS: regular rate and rhythm  Abdominal: soft, nontender, positive bowel sounds  Extremities: no cyanosis or edema  Skin: warm and dry without rash      Labs:  Results from last 7 days   Lab Units  06/18/21  0250 06/17/21  0609 06/16/21  1706   WBC 10*3/mm3 8.02 8.52 6.65   HEMOGLOBIN g/dL 8.9* 9.9* 8.5*   HEMATOCRIT % 29.4* 32.3* 27.3*   PLATELETS 10*3/mm3 126* 130* 145         Results from last 7 days   Lab Units 06/18/21  0250 06/17/21  0609 06/16/21  1706 06/16/21  1245   SODIUM mmol/L 138 135* 137 137   POTASSIUM mmol/L 3.8 3.8 3.1* 3.1*   CHLORIDE mmol/L 98 94* 95* 94*   CO2 mmol/L 29.0 28.0 28.0 28.0   BUN mg/dL 16 30* 25* 25*   CREATININE mg/dL 2.40* 4.26* 4.06* 3.68*   CALCIUM mg/dL 9.0 9.2 9.1 9.0   BILIRUBIN mg/dL  --  0.8 0.6 0.6   ALK PHOS U/L  --  200* 199* 222*   ALT (SGPT) U/L  --  18 19 20   AST (SGOT) U/L  --  29 36* 36*   GLUCOSE mg/dL 123* 97 113* 150*       Radiology:   Imaging Results (Last 72 Hours)     Procedure Component Value Units Date/Time    XR Femur 2 View Right [[id number]] Collected: 06/16/21 1933     Updated: 06/16/21 1937    Narrative:      EXAMINATION: XR FEMUR 2 VW RIGHT-     6/16/2021 6:40 PM CDT     HISTORY: rt leg pain, fall     Right femur, 4 views.     No femur fracture is seen.     The hip and knee are normal, to the extent visualized.     No soft tissue abnormality is seen.       Impression:      1. No acute bony abnormality.              This report was finalized on 06/16/2021 19:34 by Dr. Dharmesh Zuleta MD.    XR Pelvis 1 or 2 View [[id number]] Collected: 06/16/21 1931     Updated: 06/16/21 1936    Narrative:      EXAMINATION: XR PELVIS 1 OR 2 VW-     6/16/2021 6:40 PM CDT     HISTORY: fall, pain     Pelvis, one view.     Mildly displaced superior and inferior right pubic ramus fractures are  not acute and are seen on a CT exam from 6/1/2021.     . SI joints are symmetric.     No sacral fracture is seen.     No hip fracture or dislocation.     Summary:  1. Old fractures of the right superior and inferior pubic ramus.  2. No acute fracture is seen.  This report was finalized on 06/16/2021 19:33 by Dr. Dharmesh Zuleta MD.    XR Hand 3+ View Right [[id number]] Collected:  06/16/21 1930     Updated: 06/16/21 1934    Narrative:      EXAMINATION: XR HAND 3+ VW RIGHT-     6/16/2021 6:40 PM CDT     HISTORY: Fall injury. Right hand pain.     Right hand, 3 views.     Osteopenia.     Chronic appearing small soft tissue calcifications are seen adjacent to  the third and fourth MCP joints.     Metacarpals and fingers show no sign of acute fracture.     The distal aspect of the second finger and third finger are obscured by  a Sensor and wire on some of the images.     Carpal bones align normally.     Summary:  1. No acute fracture is seen.     This report was finalized on 06/16/2021 19:31 by Dr. Dharmesh Zuleta MD.    XR Wrist 3+ View Right [[id number]] Collected: 06/16/21 1929     Updated: 06/16/21 1932    Narrative:      EXAMINATION: XR WRIST 3+ VW RIGHT-     6/16/2021 6:40 PM CDT     HISTORY: Fall injury. Right wrist pain.     Right wrist, 3 views.     Osteopenia.     Diffuse arterial calcification.     Intact distal radius and ulna.     Carpal bones align normally.     Proximal metacarpals are intact.     Summary:  1. No acute fracture.     This report was finalized on 06/16/2021 19:29 by Dr. Dharmesh Zuleta MD.    XR Chest 1 View [[id number]] Collected: 06/16/21 1927     Updated: 06/16/21 1932    Narrative:      EXAMINATION: XR CHEST 1 VW-     6/16/2021 6:40 PM CDT     HISTORY: fever, AMS     1 view chest x-ray compared with 6/7/2021.     Stable heart and mediastinum.  Aortic arch calcification.     Interval removal of a right jugular central line.     There is increased interstitial prominence compatible with interstitial  edema and early heart failure.     There is an unchanged appearance of a moderate right pleural effusion.     Nonhealed fracture of the right humeral neck.  This finding was demonstrated on a radiograph from 4/1/2021.     Summary:  1. Increased interstitial prominence compatible with interstitial edema  and early failure.  2. Unchanged or minimally decreased moderate right  pleural effusion.  This report was finalized on 06/16/2021 19:28 by Dr. Dharmesh Zuleta MD.    CT Cervical Spine Without Contrast [[id number]] Collected: 06/16/21 1827     Updated: 06/16/21 1832    Narrative:      EXAMINATION: CT CERVICAL SPINE WO CONTRAST-      6/16/2021 6:13 PM CDT     HISTORY: Fall injury. Neck pain. Altered mental status.     In order to have a CT radiation dose as low as reasonably achievable  Automated Exposure Control was utilized for adjustment of the mA and/or  KV according to patient size.     DLP in mGycm= 593.     Noncontrast cervical spine CT.     Comparison is made with cervical spine CT imaging from 3/26/2021.     The patient was unable to cooperate and the images are affected by  motion.     Mild right convex curvature on the coronal sequence is negative and  probably is positional.     The lateral view shows appropriate lordotic curvature with no vertebral  body malalignment.     Prevertebral soft tissues are normal.     Facet joints align normally.     Summary:  1. No acute fracture is seen.                                   This report was finalized on 06/16/2021 18:29 by Dr. Dharmesh Zuleta MD.    CT Head Without Contrast [[id number]] Collected: 06/16/21 1826     Updated: 06/16/21 1830    Narrative:      EXAMINATION: CT HEAD WO CONTRAST-      6/16/2021 6:13 PM CDT     HISTORY: Altered mental status.     In order to have a CT radiation dose as low as reasonably achievable  Automated Exposure Control was utilized for adjustment of the mA and/or  KV according to patient size.     DLP in mGycm= 594.     Noncontrast head CT.     Comparison is made with a noncontrast head CT from 6/1/2021.     Axial, sagittal, and coronal noncontrast CT imaging of the head.     The visualized paranasal sinuses are clear.     The brain and ventricles have an age appropriate appearance.   There is no hemorrhage or mass-effect.   No acute infarction is seen.     No calvarial abnormality.       Impression:       1. No acute intracranial abnormality is seen.  2. Stable chronic change.                                         This report was finalized on 06/16/2021 18:27 by Dr. Dharmesh Zuleta MD.          Culture:  Blood Culture   Date Value Ref Range Status   06/16/2021 Abnormal Stain (C)  Preliminary   06/16/2021 Abnormal Stain (C)  Preliminary     Urine Culture   Date Value Ref Range Status   06/16/2021 >100,000 CFU/mL Mixed Janeth Isolated  Final         Assessment   1.  End stage renal disease on HD TTS  2.  Metabolic encephalopathy--improved  3.  Sepsis  4.  Type 2 diabetes with hypoglycemia  5.  Hypertension  6.  Anemia of CKD  7.  COPD  7.  Hypokalemia--improved  9.  C Diff colitis       Plan:  1.  Dialysis next due Saturday 6/19  2.  Monitor labs      Gomez Lemos, ANTHONY  6/18/2021  09:09 CDT

## 2021-06-18 NOTE — CASE MANAGEMENT/SOCIAL WORK
Continued Stay Note  [facility]     Patient Name: Mini Gentile  MRN: [id number]  Today's Date: 6/18/2021    Admit Date: 6/16/2021    Discharge Plan     Row Name 06/18/21 0927       Plan    Plan  [facility]    Plan Comments  Patient is currently residing at [facility] [phone].  Per Elsy, admissions director at [facility], patient does NOT have a bed hold.  Facility can accept back at discharge if female bed available.  Patient's insurance will require prior approval to return to [facility].  Will need to provide SNF advance notice of discharge for insurance precert to be completed.        Discharge Codes    No documentation.             STACI Ramirez

## 2021-06-18 NOTE — PLAN OF CARE
Goal Outcome Evaluation:  Plan of Care Reviewed With: patient        Progress: improving  Outcome Summary: OT eval completed. Pt is alert and oriented to person, place and year. She reports continued pain in R shoulder from R hurmeral neck fx on 4/4/21, at that time she was NWB in sling. Unsure of current WB'ing status of R UE, will continue to proceed with NWB d/t pain until otherwise cleared by MD. She demonstrated decreased strength, balance, activity tolerance and ROM. Min A for bed mobility. Mod A for LBD. Min A for sit <> stand t/f from EOB and in room mobility. She would benefit from skilled OT services to address these deficits. Recommend d/c SNF, pending pt progress.

## 2021-06-18 NOTE — PLAN OF CARE
Goal Outcome Evaluation:  Plan of Care Reviewed With: patient        Progress: no change  Outcome Summary: Patient had hemo dialysis completed today with 2200ml removed. [facility]. Has remained confused throughout the day, but more awakening easier and appears more alert.

## 2021-06-19 NOTE — CONSULTS
INFECTIOUS DISEASES CONSULT NOTE    Patient:  Mini Gentile 61 y.o. female  ROOM # 371/1  YOB: 1960  MRN: [id number]  [facility]:  [id number]  Admit date: 6/16/2021   Admitting Physician: Bin Guy DO  Primary Care Physician: Bharati Dahl APRN  REFERRING PROVIDER: Tejinder Kent MD    Reason for Consultation: C. difficile, Pseudomonas bacteremia, multiple allergies    History of Present Illness/Chief Complaint: Pleasant 61-year-old woman.  Little bit difficult to get clear history from the patient.  Asked what brought her to the hospital.  She indicates she was at a nursing home.  She indicates she had fallen.  She really was not sure that she had.  My review the admit H&P she had been found lying face down in her bed.  She was confused.  She was brought to the hospital.  She had been experiencing some diarrhea.  Per review of the admit H&P she was uncomfortable and moaning in bed.  She underwent extensive evaluation in the emergency room.  Multiple images did not show anything acute.  She had had some hypotension.  She received antibiotic treatment a few weeks ago for pneumonia.  Additional testing revealed she was stool C. difficile positive.  She also is now growing gram-negative anson of the blood.  She has been improving with her initial therapy with oral vancomycin and intravenous cefepime.  Infectious disease asked to evaluate and offer recommendations given the combination of C. difficile and bacteremia.    Current Scheduled Medications:   aspirin, 81 mg, Oral, Daily  atropine, 1 drop, Left Eye, Daily  cefepime, 500 mg, Intravenous, Q24H  cholecalciferol, 2,000 Units, Oral, Daily  epoetin vika-epbx, 10,000 Units, Subcutaneous, Once per day on Tue Thu Sat  famotidine, 20 mg, Oral, Once per day on Tue Thu Sat  Ferric Citrate, 4 tablet, Oral, TID With Meals  fluticasone, 2 spray, Nasal, BID  insulin lispro, 2-7 Units, Subcutaneous, TID AC  lactobacillus acidophilus, 1 capsule, Oral,  "Daily  pravastatin, 10 mg, Oral, Nightly  sertraline, 25 mg, Oral, Daily  sodium chloride, 10 mL, Intravenous, Q12H  timolol, 1 drop, Left Eye, Daily  vancomycin, 125 mg, Oral, Q6H      Current PRN Medications:  •  acetaminophen **OR** acetaminophen  •  brimonidine  •  dextrose  •  dextrose  •  glucagon (human recombinant)  •  ipratropium-albuterol  •  ondansetron **OR** ondansetron  •  sodium chloride  •  [COMPLETED] Insert peripheral IV **AND** sodium chloride  •  sodium chloride    Allergies:    Allergies   Allergen Reactions   • Bupropion Nausea Only   • Chantix [Varenicline] Other (See Comments)     Does not remember   • Gabapentin Hallucinations     hallucinations   • Azithromycin Rash   • Levofloxacin Rash   • Penicillins Rash   • Sulfa Antibiotics Rash     Past Medical History: Diabetes mellitus.  End-stage renal disease.  She undergoes hemodialysis.  Hypertension.  History of mucoid otitis media.  She has some compliance issues with dialysis.  Tobacco use.  Hyperlipidemia.    Past Surgical History: Left arm fistula.   section.  Cholecystectomy.  Myringotomy tube placement.  Tubal ligation.    Social History: She smokes tobacco.  No alcohol use.  No illicit drug use.    Family History: Diabetes mellitus.  Heart disease.    Exposure History: No close contacts have been ill.    Review of Systems see HPI    Vital Signs:  /62 (BP Location: Right leg, Patient Position: Lying)   Pulse 76   Temp 98.5 °F (36.9 °C)   Resp 16   Ht 147.3 cm (58\")   Wt 67.8 kg (149 lb 7.6 oz)   SpO2 94%   BMI 31.24 kg/m²  Temp (24hrs), Av.7 °F (37.1 °C), Min:98.3 °F (36.8 °C), Max:99.1 °F (37.3 °C)    Physical Exam  Vital signs - reviewed.  Line/IV site - No erythema or tenderness.  Lungs are clear without crackles or wheezes  Heart distant heart sounds without apparent murmur  Abdomen with normal bowel sounds.  No significant distention.  Minimal tenderness.  No rebound.  Extremities trace edema  Neurological " examination did not appear to show any focal deficits or lateralizing findings.  She was alert able to answer questions.  At times seem to have a little bit of confusion as to sequence of events, was following commands and answering questions appropriately.    Lab Results:  CBC:   Results from last 7 days   Lab Units 06/19/21  0550 06/18/21  0250 06/17/21  0609 06/16/21  1706 06/16/21  1245   WBC 10*3/mm3 7.47 8.02 8.52 6.65 5.45   HEMOGLOBIN g/dL 9.2* 8.9* 9.9* 8.5* 8.9*   HEMATOCRIT % 30.1* 29.4* 32.3* 27.3* 28.0*   PLATELETS 10*3/mm3 121* 126* 130* 145 156   LYMPHOCYTE % % 5.0* 4.0* 3.0* 4.0* 3.0*   MONOCYTES % % 6.0 5.0 3.0* 3.0* 4.0*     CMP:   Results from last 7 days   Lab Units 06/19/21  0550 06/18/21  0250 06/17/21  0609 06/16/21  1706 06/16/21  1245   SODIUM mmol/L 135* 138 135* 137 137   POTASSIUM mmol/L 3.5 3.8 3.8 3.1* 3.1*   CHLORIDE mmol/L 95* 98 94* 95* 94*   CO2 mmol/L 27.0 29.0 28.0 28.0 28.0   BUN mg/dL 30* 16 30* 25* 25*   CREATININE mg/dL 3.21* 2.40* 4.26* 4.06* 3.68*   CALCIUM mg/dL 9.5 9.0 9.2 9.1 9.0   BILIRUBIN mg/dL  --   --  0.8 0.6 0.6   ALK PHOS U/L  --   --  200* 199* 222*   ALT (SGPT) U/L  --   --  18 19 20   AST (SGOT) U/L  --   --  29 36* 36*   GLUCOSE mg/dL 124* 123* 97 113* 150*     Blood cultures June 16, 2021:  Susceptibility     Pseudomonas aeruginosa     VICKEY (Preliminary)     Cefepime 32 ug/ml Resistant     Ceftazidime >=64 ug/ml Resistant     Ciprofloxacin <=0.25 ug/ml Susceptible     Levofloxacin 0.5 ug/ml Susceptible      Radiology:  Chest x-ray June 16, 2021:  Summary:  1. Increased interstitial prominence compatible with interstitial edema  and early failure.  2. Unchanged or minimally decreased moderate right pleural effusion.  This report was finalized on 06/16/2021 19:28 by Dr. Dharmesh Zuleta MD.     CT abdomen and pelvis on June 1, 2021:  IMPRESSION:  1. Anasarca. Otherwise, no acute process involving the abdomen or  pelvis.  This report was finalized on 06/01/2021  19:54 by Dr Jhonatan Berry,     Additional Studies Reviewed:     Impression:   1.  Pseudomonas bacteremia-suspect this was transient via gastrointestinal source.  2.  C. difficile colitis  3.  End-stage renal disease on hemodialysis  4.  Diabetes mellitus  5.  Allergies listed to azithromycin, levofloxacin, penicillins, and sulfa antibiotics-nature of reaction uncertain    Recommendations:    Continue treatment with oral vancomycin  Discontinue cefepime  Begin treatment with meropenem  Will plan 5-day course of meropenem treatment (asking micro to report meropenem susceptibility)-if not susceptible we will asked him to report Avycaz  Suspect bacteremia was transient and do not feel extended course of treatment will be necessary and feel a more truncated antibiotic course would be preferable given her C. difficile  Will continue to follow    Fabio Gomez MD  06/19/21  08:24 CDT

## 2021-06-19 NOTE — NURSING NOTE
KELLY Nunez RN and Cuate RN from ICU attempted IV access as well and was unsuccessful. Dr. Guy was notifed. Dr. Ledesma attempt IV access with long 20G IV via US and was unsuccessful. Plan for no IV access tonight and will consult VAT in am. Dr. Guy is aware.

## 2021-06-19 NOTE — PLAN OF CARE
Goal Outcome Evaluation:  Plan of Care Reviewed With: patient           Outcome Summary: Pt denies pain. Pt turn Q2. Incontinent. Z-guard applied. Telemtry on. SCDs on. Oral vancomycin. No c/o nausea or vomiting. Neurochecks Q4, pt alert and oriented. VSS. Fistula in left arm, no BP or sticks left arm.

## 2021-06-19 NOTE — PROGRESS NOTES
[facility] Medicine Services  INPATIENT PROGRESS NOTE    Patient Name: Mini Gentile  Date of Admission: 6/16/2021  Today's Date: 06/19/21  Length of Stay: 3  Primary Care Physician: Bharati Dahl APRN    Subjective   Chief Complaint: AMS/cdiff    HPI:  Seen at dialysis.  Continues to do well.  Mental status good.  Denies any significant pain.  No chest pain or shortness of breath.  No abdominal pain.  No nausea.  Tolerating p.o.    Review of Systems   All pertinent negatives and positives are as above. All other systems have been reviewed and are negative unless otherwise stated.     Objective    Temp:  [98.3 °F (36.8 °C)-99.1 °F (37.3 °C)] 98.5 °F (36.9 °C)  Heart Rate:  [] 76  Resp:  [14-18] 16  BP: (122-178)/(60-79) 158/62  Physical Exam  GEN: awake, alert, interactive, NAD  HEENT: EOMI, Anicteric, Trachea midline  Lungs: no wheezing/rales/rhonchi  Heart: RRR, +S1/s2, no rub  ABD: soft, nt, +BS, no guarding/rebound  Extremities:  no cyanosis, no edema  Skin: no rashes or petechiae   Neuro: AAOx3, MONSALVE, no focal deficits   Psych: difficult to assess at this time        Results Review:  I have reviewed the labs, radiology results, and diagnostic studies.    Laboratory Data:   Results from last 7 days   Lab Units 06/19/21  0550 06/18/21  0250 06/17/21  0609   WBC 10*3/mm3 7.47 8.02 8.52   HEMOGLOBIN g/dL 9.2* 8.9* 9.9*   HEMATOCRIT % 30.1* 29.4* 32.3*   PLATELETS 10*3/mm3 121* 126* 130*        Results from last 7 days   Lab Units 06/19/21  0550 06/18/21  0250 06/17/21  0609 06/16/21  1706 06/16/21  1245   SODIUM mmol/L 135* 138 135* 137 137   POTASSIUM mmol/L 3.5 3.8 3.8 3.1* 3.1*   CHLORIDE mmol/L 95* 98 94* 95* 94*   CO2 mmol/L 27.0 29.0 28.0 28.0 28.0   BUN mg/dL 30* 16 30* 25* 25*   CREATININE mg/dL 3.21* 2.40* 4.26* 4.06* 3.68*   CALCIUM mg/dL 9.5 9.0 9.2 9.1 9.0   BILIRUBIN mg/dL  --   --  0.8 0.6 0.6   ALK PHOS U/L  --   --  200* 199* 222*   ALT (SGPT) U/L  --    --  18 19 20   AST (SGOT) U/L  --   --  29 36* 36*   GLUCOSE mg/dL 124* 123* 97 113* 150*       Culture Data:   No results found for: BLOODCX, URINECX, WOUNDCX, MRSACX, RESPCX, STOOLCX    Radiology Data:   Imaging Results (Last 24 Hours)     ** No results found for the last 24 hours. **          I have reviewed the patient's current medications.     Assessment/Plan     Active Hospital Problems    Diagnosis    • **C. difficile diarrhea    • Bacteremia due to Pseudomonas    • Diarrhea    • Hypokalemia    • Altered mental status    • DM2 (diabetes mellitus, type 2) (CMS/[facility])    • End stage renal failure on dialysis (CMS/[facility])    • Diabetes (CMS/[facility])    • HTN (hypertension)        #1 C. difficile diarrhea -in the setting of recent antibiotics for aspiration pneumonia.  Appears to be more of a mild case.  No abdominal pain.  No white count elevation.  Afebrile.  Getting p.o. vancomycin.    #2 Pseudomonas bacteremia -unclear etiology.  She previously had an aspiration pneumonia but no evidence of on pneumonia now.  Urine did grow bacteria but mixed jacquie and this is a dialysis patient.  Patient was started on cefepime yesterday however subsequently culture came back resistant in the afternoon.  Now on Merrem.  ID following helping.  Appreciate input.    #3 altered mental status -likely metabolic encephalopathy in the setting of her underlying infections and dialysis needs.  She now doing much better and back to baseline.    #4 hypotension -resolved and blood pressure much better, now starting to elevate.  Resume home Coreg.    #5 hypokalemia -resolved.  Likely in setting of diarrhea.    #6 ESRD -nephrology consulted.  HD today.  Continue per nephrology.    #7 DM2 -sliding scale insulin and hypoglycemia protocol.      Discharge Planning: Ongoing.  Doing better.  New current treatment.  PT OT.  Likely back to SNF in several days.    Electronically signed by Bin Guy DO, 06/19/21, 09:04 CDT.

## 2021-06-20 NOTE — PLAN OF CARE
Goal Outcome Evaluation:  Plan of Care Reviewed With: patient           Outcome Summary: Pt resting. Pt denies pain. SCDs on. Turn Q2. Pt incontinent of stool. Z-guard applied. Pt A/O. Neurochecks Q4. Oral vancomycin. VSS.

## 2021-06-20 NOTE — PLAN OF CARE
Problem: Adult Inpatient Plan of Care  Goal: Plan of Care Review  Outcome: Ongoing, Progressing  Flowsheets (Taken 6/20/2021 8190)  Progress: improving  Plan of Care Reviewed With: patient  Outcome Summary: Pt. able to transfer chair-rolling shower chair with cga and sit in chair and perform showering, afterward able to dress with mod. assist with bottoms/socks!   Goal Outcome Evaluation:  Plan of Care Reviewed With: patient        Progress: improving  Outcome Summary: Pt. able to transfer chair-rolling shower chair with cga and sit in chair and perform showering, afterward able to dress with mod. assist with bottoms/socks!

## 2021-06-20 NOTE — PROGRESS NOTES
[facility] Medicine Services  INPATIENT PROGRESS NOTE    Patient Name: Mini Gentile  Date of Admission: 6/16/2021  Today's Date: 06/20/21  Length of Stay: 4  Primary Care Physician: Bharati Dahl APRN    Subjective   Chief Complaint: AMS/cdiff    HPI:  Patient sitting in chair at bedside.  On room air.  Awake alert interactive.  Says she feels well.  Bowel movements improving.  No chest pain or shortness of breath.    Review of Systems   All pertinent negatives and positives are as above. All other systems have been reviewed and are negative unless otherwise stated.     Objective    Temp:  [98 °F (36.7 °C)-98.4 °F (36.9 °C)] 98 °F (36.7 °C)  Heart Rate:  [77-82] 77  Resp:  [16-18] 18  BP: (137-153)/(56-80) 153/67  Physical Exam  GEN: awake, alert, interactive, NAD  HEENT: EOMI, Anicteric, Trachea midline  Lungs: no wheezing/rales/rhonchi  Heart: RRR, +S1/s2, no rub  ABD: soft, nt, +BS, no guarding/rebound  Extremities:  no cyanosis, no edema  Skin: no rashes or petechiae   Neuro: AAOx3, MONSALVE, no focal deficits   Psych: difficult to assess at this time        Results Review:  I have reviewed the labs, radiology results, and diagnostic studies.    Laboratory Data:   Results from last 7 days   Lab Units 06/19/21  0550 06/18/21  0250 06/17/21  0609   WBC 10*3/mm3 7.47 8.02 8.52   HEMOGLOBIN g/dL 9.2* 8.9* 9.9*   HEMATOCRIT % 30.1* 29.4* 32.3*   PLATELETS 10*3/mm3 121* 126* 130*        Results from last 7 days   Lab Units 06/20/21  0625 06/19/21  0550 06/18/21  0250 06/17/21  0609 06/16/21  1706 06/16/21  1245   SODIUM mmol/L 132* 135* 138 135* 137 137   POTASSIUM mmol/L 4.2 3.5 3.8 3.8 3.1* 3.1*   CHLORIDE mmol/L 94* 95* 98 94* 95* 94*   CO2 mmol/L 25.0 27.0 29.0 28.0 28.0 28.0   BUN mg/dL 16 30* 16 30* 25* 25*   CREATININE mg/dL 2.43* 3.21* 2.40* 4.26* 4.06* 3.68*   CALCIUM mg/dL 9.2 9.5 9.0 9.2 9.1 9.0   BILIRUBIN mg/dL  --   --   --  0.8 0.6 0.6   ALK PHOS U/L  --   --   --   200* 199* 222*   ALT (SGPT) U/L  --   --   --  18 19 20   AST (SGOT) U/L  --   --   --  29 36* 36*   GLUCOSE mg/dL 181* 124* 123* 97 113* 150*       Culture Data:   No results found for: BLOODCX, URINECX, WOUNDCX, MRSACX, RESPCX, STOOLCX    Radiology Data:   Imaging Results (Last 24 Hours)     ** No results found for the last 24 hours. **          I have reviewed the patient's current medications.     Assessment/Plan     Active Hospital Problems    Diagnosis    • **C. difficile diarrhea    • Bacteremia due to Pseudomonas    • Diarrhea    • Hypokalemia    • Altered mental status    • DM2 (diabetes mellitus, type 2) (CMS/[facility])    • End stage renal failure on dialysis (CMS/[facility])    • Diabetes (CMS/[facility])    • HTN (hypertension)        #1 C. difficile diarrhea -in the setting of recent antibiotics for aspiration pneumonia.  Appears to be more of a mild case.  No abdominal pain.  No white count elevation.  Afebrile.  Getting p.o. vancomycin. Improving.     #2 Pseudomonas bacteremia -unclear etiology.  She previously had an aspiration pneumonia but no evidence of on pneumonia now.  Urine did grow bacteria but mixed jacquie and this is a dialysis patient.  Now on merrem. ID following.     #3 altered mental status -likely metabolic encephalopathy in the setting of her underlying infections and dialysis needs.  She now doing much better and back to baseline.    #4 hypotension -resolved and blood pressure much better, now back on home coreg    #5 hypokalemia -resolved.  Likely in setting of diarrhea.    #6 ESRD -nephrology consulted.  Continue HD per nephrology shedule.    #7 DM2 -sliding scale insulin and hypoglycemia protocol.      Discharge Planning: Ongoing.  Doing better.  Continue current treatment.  PT/OT.  Likely back to SNF in several days.    Electronically signed by Bin Guy DO, 06/20/21, 10:51 CDT.

## 2021-06-20 NOTE — PLAN OF CARE
Goal Outcome Evaluation:  Plan of Care Reviewed With: patient        Progress: no change  Outcome Summary: patient only with a small smear of stool today.  sat up in chair all day.  good appetite today.  IV placed per VATS team.  fistula in left arm with edema and bruising.  no complaints of pain

## 2021-06-20 NOTE — THERAPY TREATMENT NOTE
Acute Care - Physical Therapy Treatment Note  [facility]     Patient Name: Mini Gentile  : 1960  MRN: [id number]  Today's Date: 2021           PT Assessment (last 12 hours)      PT Evaluation and Treatment     Row Name 21 0841          Physical Therapy Time and Intention    Subjective Information  no complaints  -     Document Type  therapy note (daily note)  -     Mode of Treatment  physical therapy  -     Row Name 21 0841          General Information    Existing Precautions/Restrictions  fall;shoulder;non-weight bearing NWB RUE due to unclear order  -     Row Name 21 08          Pain Scale: Word Pre/Post-Treatment    Pain: Word Scale, Pretreatment  0 - no pain  -     Posttreatment Pain Rating  0 - no pain  -     Row Name 21          Bed Mobility    Comment (Bed Mobility)  in the chair   -     Row Name 21          Transfers    Sit-Stand Gregory (Transfers)  verbal cues;contact guard  -     Row Name 21          Gait/Stairs (Locomotion)    Gregory Level (Gait)  verbal cues;contact guard;minimum assist (75% patient effort)  -     Assistive Device (Gait)  -- HHA   -NAOMI     Distance in Feet (Gait)  120  -     Row Name 21          Motor Skills    Therapeutic Exercise  aerobic  -     Row Name 21          Aerobic Exercise    Comment, Aerobic Exercise (Therapeutic Exercise)  AROM BLE X 20 sitting   -     Row Name             Wound 21 0142 coccyx    Wound - Properties Group Placement Date: 21  -VT Placement Time: 142 Location: coccyx  -VT    Retired Wound - Properties Group Date first assessed: 21  -VT Time first assessed: 142  -VT Location: coccyx  -VT    Row Name 21 0841          Positioning and Restraints    Pre-Treatment Position  sitting in chair/recliner  -NAOMI     Post Treatment Position  chair  -NAOMI     In Chair  sitting;call light within reach;encouraged to call  for assist;exit alarm on  -NAOMI       User Key  (r) = Recorded By, (t) = Taken By, (c) = Cosigned By    Initials Name Provider Type    NAOMI Domingo Dill PTA Physical Therapy Assistant    Alyx Kumar, RN Registered Nurse        Physical Therapy Education                 Title: PT OT SLP Therapies (In Progress)     Topic: Physical Therapy (In Progress)     Point: Mobility training (Done)     Learning Progress Summary           Patient Acceptance, E, VU by NAOMI at 6/20/2021 0841    Comment: safety with mobility and amb    Acceptance, E, VU,NR by ARIES at 6/18/2021 1023    Comment: progression of PT POC, benefits of activity                   Point: Home exercise program (Not Started)     Learner Progress:  Not documented in this visit.          Point: Body mechanics (Not Started)     Learner Progress:  Not documented in this visit.          Point: Precautions (Not Started)     Learner Progress:  Not documented in this visit.                      User Key     Initials Effective Dates Name Provider Type Discipline    ARIES 08/02/16 -  Natanael Pardo PT DPT Physical Therapist PT    NAOMI 12/08/16 -  Domingo Dill PTA Physical Therapy Assistant PT              PT Recommendation and Plan     Plan of Care Reviewed With: patient  Progress: improving  Outcome Summary: Pt was up in the chair.  Able to transfer sit to stand with CGA.  Amb 120' with HHA and CGA/min assist.  Will continue to work with pt to increase strength and safety with all mobility       Time Calculation:   PT Charges     Row Name 06/20/21 0841             Time Calculation    Start Time  0841  -NAOMI      Stop Time  0904  -NAOMI      Time Calculation (min)  23 min  -NAOMI      PT Received On  06/20/21  -NAOMI         Time Calculation- PT    Total Timed Code Minutes- PT  23 minute(s)  -NAOMI         Timed Charges    65499 - PT Therapeutic Exercise Minutes  10  -NAOMI      92480 - Gait Training Minutes   13  -NAOMI         Total Minutes    Timed Charges Total Minutes   23  -NAOMI       Total Minutes  23  -NAOMI        User Key  (r) = Recorded By, (t) = Taken By, (c) = Cosigned By    Initials Name Provider Type    Domingo Whitaker PTA Physical Therapy Assistant        Therapy Charges for Today     Code Description Service Date Service Provider Modifiers Qty    09016418988 HC GAIT TRAINING EA 15 MIN 6/20/2021 Domingo Dill, DAWN GP 1    74562995799 HC PT THER PROC EA 15 MIN 6/20/2021 Domingo Dill, PTA GP 1          PT G-Codes  Outcome Measure Options: AM-PAC 6 Clicks Basic Mobility (PT)  AM-PAC 6 Clicks Score (PT): 17  AM-PAC 6 Clicks Score (OT): 18    Domingo Dill PTA  6/20/2021

## 2021-06-20 NOTE — PLAN OF CARE
Goal Outcome Evaluation:  Plan of Care Reviewed With: patient        Progress: improving  Outcome Summary: Pt was up in the chair.  Able to transfer sit to stand with CGA.  Amb 120' with HHA and CGA/min assist.  Will continue to work with pt to increase strength and safety with all mobility

## 2021-06-20 NOTE — PROGRESS NOTES
Nephrology ([facility]) Progress Note      Patient:  Mini Gentile  YOB: 1960  Date of Service: 6/19/2021  MRN: [id number]   Acct: [id number]   Primary Care Physician: Bharati Dahl APRN  Advance Directive:   Code Status and Medical Interventions:   Ordered at: 06/17/21 0220     Code Status:    CPR     Medical Interventions (Level of Support Prior to Arrest):    Full     Admit Date: 6/16/2021       Hospital Day: 3  Referring Provider: Tejinder Kent MD      Patient personally seen and examined.  Complete chart including Consults, Notes, Operative Reports, Labs, Cardiology, and Radiology studies reviewed as able.        Subjective:  Mini Gentile is a 61 y.o. female  whom we were consulted for end stage renal disease. Maintenance hemodialysis on Tuesday Thursday Saturday at [facility]. Fairly noncompliant with dialysis treatments. She was just discharged from [facility] on 6/8. Unknown if she has been going to HD treatments since then.  Presented to ER after she was noted to be confused at nursing home.       Today, no overnight events. She has moved to the floor. Denies chest pain, shortness of air, nausea or vomiting.  IV access issues noted.  Nursing present.       Allergies:  Bupropion, Chantix [varenicline], Gabapentin, Azithromycin, Levofloxacin, Penicillins, and Sulfa antibiotics    Home Meds:  Medications Prior to Admission   Medication Sig Dispense Refill Last Dose   • aspirin 81 MG EC tablet Take 81 mg by mouth Daily.   6/16/2021 at Unknown time   • atropine 1 % ophthalmic solution Administer 1 drop into the left eye Daily.   6/16/2021 at Unknown time   • brimonidine (ALPHAGAN) 0.2 % ophthalmic solution Administer 1 drop into the left eye 2 (two) times a day.   6/16/2021 at Unknown time   • carvedilol (COREG) 6.25 MG tablet Take 6.25 mg by mouth 2 (Two) Times a Day With Meals.   6/16/2021 at Unknown time   • cholecalciferol (VITAMIN D3) 1000  units tablet Take 2,000 Units by mouth Daily.   6/16/2021 at Unknown time   • dorzolamide (TRUSOPT) 2 % ophthalmic solution Administer 1 drop into the left eye 2 (Two) Times a Day.   6/16/2021 at Unknown time   • epoetin vika-epbx (RETACRIT) 33347 UNIT/ML injection Inject 1 mL under the skin into the appropriate area as directed 3 (Three) Times a Week. Indications: ESRD on Dialysis 6.6 mL  6/16/2021 at Unknown time   • famotidine (PEPCID) 20 MG tablet Take 1 tablet by mouth Daily.   6/16/2021 at Unknown time   • Ferric Citrate 1  MG(Fe) tablet Take 1 tablet by mouth 3 (Three) Times a Day With Meals.   6/16/2021 at Unknown time   • fluticasone (FLONASE) 50 MCG/ACT nasal spray 2 sprays into the nostril(s) as directed by provider 2 (Two) Times a Day.   6/16/2021 at Unknown time   • insulin lispro (humaLOG) 100 UNIT/ML injection Inject under the skin TID AC as per sliding scale:  200-249= 2 units  250-299= 4 units  300-349= 6 units  350-399= 8 units  400-500= 10 units and call MD   6/16/2021 at Unknown time   • metoclopramide (REGLAN) 5 MG tablet Take 1 tablet by mouth 4 (Four) Times a Day Before Meals & at Bedtime.   6/16/2021 at Unknown time   • rosuvastatin (CRESTOR) 5 MG tablet Take 5 mg by mouth every night at bedtime.   6/15/2021 at Unknown time   • saccharomyces boulardii (FLORASTOR) 250 MG capsule Take 250 mg by mouth Daily.   6/16/2021 at Unknown time   • sertraline (ZOLOFT) 25 MG tablet Take 1 tablet by mouth Daily.   6/16/2021 at Unknown time   • timolol (TIMOPTIC) 0.5 % ophthalmic solution Administer 1 drop into the left eye Every Morning.  12 6/16/2021 at Unknown time   • acetaminophen (TYLENOL) 325 MG tablet Take 650 mg by mouth Every 4 (Four) Hours As Needed for Mild Pain  or Fever.   Unknown at Unknown time   • albuterol sulfate  (90 Base) MCG/ACT inhaler Inhale 2 puffs Every 4 (Four) Hours As Needed for Wheezing. 18 g 0 Unknown at Unknown time   • ipratropium-albuterol (DUO-NEB) 0.5-2.5  mg/3 ml nebulizer Take 3 mL by nebulization Every 4 (Four) Hours As Needed for Wheezing or Shortness of Air.   Unknown at Unknown time   • ondansetron (ZOFRAN) 4 MG tablet Take 1 tablet by mouth Every 6 (Six) Hours As Needed for Nausea or Vomiting. 24 tablet 2 Unknown at Unknown time       Medicines:  Current Facility-Administered Medications   Medication Dose Route Frequency Provider Last Rate Last Admin   • acetaminophen (TYLENOL) tablet 650 mg  650 mg Oral Q4H PRN Bin Guy DO   650 mg at 06/17/21 2032    Or   • acetaminophen (TYLENOL) suppository 650 mg  650 mg Rectal Q4H PRN Bin Guy DO       • albumin human 25 % IV SOLN 12.5 g  12.5 g Intravenous PRN Adi Gonzalez MD   12.5 g at 06/19/21 1030   • aspirin EC tablet 81 mg  81 mg Oral Daily Bin Guy DO   81 mg at 06/19/21 0823   • atropine 1 % ophthalmic solution 1 drop  1 drop Left Eye Daily Bin Guy DO   1 drop at 06/19/21 0827   • brimonidine (ALPHAGAN) 0.2 % ophthalmic solution 1 drop  1 drop Left Eye BID PRN Bin Guy DO   1 drop at 06/19/21 0827   • carvedilol (COREG) tablet 6.25 mg  6.25 mg Oral BID With Meals Bin Guy DO   6.25 mg at 06/19/21 1730   • cholecalciferol (VITAMIN D3) tablet 2,000 Units  2,000 Units Oral Daily Bin Guy DO   2,000 Units at 06/19/21 0823   • dextrose (D50W) 25 g/ 50mL Intravenous Solution 25 g  25 g Intravenous Q15 Min PRN Bin Guy DO       • dextrose (GLUTOSE) oral gel 15 g  15 g Oral Q15 Min PRN Bin Guy DO       • epoetin vika-epbx (RETACRIT) injection 10,000 Units  10,000 Units Subcutaneous Once per day on Tue Thu Sat Bin Guy DO   10,000 Units at 06/19/21 0823   • famotidine (PEPCID) tablet 20 mg  20 mg Oral Once per day on Tue Thu Sat Bin Guy DO   20 mg at 06/19/21 1729   • Ferric Citrate tablet 840 mg  4 tablet Oral TID With Meals Bin Guy DO   840 mg at 06/19/21 1729   • fluticasone (FLONASE) 50 MCG/ACT  nasal spray 2 spray  2 spray Nasal BID Bin Guy DO   2 spray at 06/19/21 2215   • glucagon (human recombinant) (GLUCAGEN DIAGNOSTIC) injection 1 mg  1 mg Subcutaneous Q15 Min PRN Bin Guy DO       • insulin lispro (humaLOG) injection 2-7 Units  2-7 Units Subcutaneous TID AC Bin Guy DO   2 Units at 06/18/21 1424   • ipratropium-albuterol (DUO-NEB) nebulizer solution 3 mL  3 mL Nebulization Q4H PRN Bin Guy DO       • lactobacillus acidophilus (RISAQUAD) capsule 1 capsule  1 capsule Oral Daily Bin Guy DO   1 capsule at 06/19/21 0823   • meropenem (MERREM) 1 g/100 mL 0.9% NS VTB (mbp)  1 g Intravenous Once Fabio Glover MD       • [START ON 6/20/2021] meropenem (MERREM) 500mg/100 mL 0.9% NS IVPB (mbp)  500 mg Intravenous Q24H Fabio Glover MD       • ondansetron (ZOFRAN) tablet 4 mg  4 mg Oral Q6H PRN Bin Guy DO        Or   • ondansetron (ZOFRAN) injection 4 mg  4 mg Intravenous Q6H PRN Bin Guy DO       • pravastatin (PRAVACHOL) tablet 10 mg  10 mg Oral Nightly Bni Guy DO   10 mg at 06/19/21 2213   • sertraline (ZOLOFT) tablet 25 mg  25 mg Oral Daily Bin Guy DO   25 mg at 06/19/21 0824   • sodium chloride 0.9 % bolus 100 mL  100 mL Intravenous PRN Bin Guy DO       • sodium chloride 0.9 % bolus 100 mL  100 mL Intravenous PRN Adi Gonzalez MD       • sodium chloride 0.9 % flush 10 mL  10 mL Intravenous PRN Bin Guy DO       • sodium chloride 0.9 % flush 10 mL  10 mL Intravenous Q12H Bin Guy DO   10 mL at 06/19/21 0823   • sodium chloride 0.9 % flush 10 mL  10 mL Intravenous PRN Bin Guy DO       • timolol (TIMOPTIC) 0.5 % ophthalmic solution 1 drop  1 drop Left Eye Daily Bin Guy DO   1 drop at 06/19/21 0821   • vancomycin oral solution reconstituted 125 mg  125 mg Oral Q6H Bin Guy DO   125 mg at 06/19/21 1199       Past Medical History:  Past Medical History:    Diagnosis Date   • 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    • Chronic otitis media    • Diabetes (CMS/[facility])    • End stage renal disease on dialysis (CMS/[facility])    • Eustachian tube dysfunction    • GERD (gastroesophageal reflux disease)    • Glaucoma    • HTN (hypertension)    • Hyperlipidemia    • Mucoid otitis media    • Noncompliance of patient with renal dialysis (CMS/[facility])    • Tobacco abuse        Past Surgical History:  Past Surgical History:   Procedure Laterality Date   • ARTERIOVENOUS FISTULA     • CATARACT EXTRACTION WITH INTRAOCULAR LENS IMPLANT     •  SECTION     • CHOLECYSTECTOMY     • MYRINGOTOMY W/ TUBES Bilateral    • MYRINGOTOMY W/ TUBES Right    • TUBAL ABDOMINAL LIGATION         Family History  Family History   Problem Relation Age of Onset   • Heart disease Mother    • Diabetes Father    • Colon cancer Neg Hx    • Colon polyps Neg Hx        Social History  Social History     Socioeconomic History   • Marital status: Single     Spouse name: Not on file   • Number of children: Not on file   • Years of education: Not on file   • Highest education level: Not on file   Tobacco Use   • Smoking status: Current Every Day Smoker     Packs/day: 2.00     Years: 6.00     Pack years: 12.00     Types: Cigarettes   • Smokeless tobacco: Never Used   Vaping Use   • Vaping Use: Never used   Substance and Sexual Activity   • Alcohol use: No   • Drug use: No   • Sexual activity: Defer         Review of Systems:  History obtained from chart review and the patient  General ROS: No fever or chills  Respiratory ROS: No cough, shortness of breath, wheezing  Cardiovascular ROS: No chest pain or palpitations  Gastrointestinal ROS: No abdominal pain or melena  Genito-Urinary ROS: No dysuria or hematuria    Objective:  Patient Vitals for the past 24 hrs:   BP Temp Temp src Pulse Resp SpO2   21 2056 139/56 98.4 °F (36.9 °C) Oral 82 18 92 %   21 1450 147/80 98.4 °F (36.9 °C) Oral 82 17 95 %    06/19/21 0745 158/62 98.5 °F (36.9 °C) -- 76 16 94 %   06/19/21 0430 168/63 98.7 °F (37.1 °C) Oral 86 18 93 %   06/18/21 2345 122/60 98.9 °F (37.2 °C) Oral 83 18 94 %       Intake/Output Summary (Last 24 hours) at 6/19/2021 2255  Last data filed at 6/19/2021 1030  Gross per 24 hour   Intake 50 ml   Output --   Net 50 ml     General: awake/alert   Chest:  clear to auscultation bilaterally without respiratory distress  CVS: regular rate and rhythm  Abdominal: soft, nontender, positive bowel sounds  Extremities: no cyanosis or edema  Skin: warm and dry without rash      Labs:  Results from last 7 days   Lab Units 06/19/21  0550 06/18/21  0250 06/17/21  0609   WBC 10*3/mm3 7.47 8.02 8.52   HEMOGLOBIN g/dL 9.2* 8.9* 9.9*   HEMATOCRIT % 30.1* 29.4* 32.3*   PLATELETS 10*3/mm3 121* 126* 130*         Results from last 7 days   Lab Units 06/19/21  0550 06/18/21  0250 06/17/21  0609 06/16/21  1706 06/16/21  1245   SODIUM mmol/L 135* 138 135* 137 137   POTASSIUM mmol/L 3.5 3.8 3.8 3.1* 3.1*   CHLORIDE mmol/L 95* 98 94* 95* 94*   CO2 mmol/L 27.0 29.0 28.0 28.0 28.0   BUN mg/dL 30* 16 30* 25* 25*   CREATININE mg/dL 3.21* 2.40* 4.26* 4.06* 3.68*   CALCIUM mg/dL 9.5 9.0 9.2 9.1 9.0   BILIRUBIN mg/dL  --   --  0.8 0.6 0.6   ALK PHOS U/L  --   --  200* 199* 222*   ALT (SGPT) U/L  --   --  18 19 20   AST (SGOT) U/L  --   --  29 36* 36*   GLUCOSE mg/dL 124* 123* 97 113* 150*       Radiology:   Imaging Results (Last 72 Hours)     ** No results found for the last 72 hours. **          Culture:  Blood Culture   Date Value Ref Range Status   06/19/2021 No growth at less than 24 hours  Preliminary   06/16/2021 Pseudomonas aeruginosa (C)  Preliminary     Comment:     Refer to previous blood culture collected 6/16/2021 at 1840 for VICKEY's.     06/16/2021 Pseudomonas aeruginosa (C)  Preliminary     Urine Culture   Date Value Ref Range Status   06/16/2021 >100,000 CFU/mL Mixed Janeth Isolated  Final         Assessment   End stage renal disease  on HD TTS  Metabolic encephalopathy  Sepsis  Type 2 diabetes with hypoglycemia  Hypertension with periods of hypotension  Anemia of CKD  COPD  Hypokalemia  C Diff colitis     Plan:  HD 6/17 and per routine schedule TTS  Monitor labs  Abx per primary  D/w pt/nursing  Avoid PICC in HD pt if at all possible      Adi Gonzalez MD  6/19/2021  22:55 CDT

## 2021-06-20 NOTE — THERAPY TREATMENT NOTE
Acute Care - Occupational Therapy Treatment Note  [facility]     Patient Name: Mini Gentile  : 1960  MRN: [id number]  Today's Date: 2021             Admit Date: 2021       ICD-10-CM ICD-9-CM   1. Altered mental status, unspecified altered mental status type  R41.82 780.97   2. Hypoxia  R09.02 799.02   3. Elevated troponin  R77.8 790.6   4. End stage renal disease (CMS/HCC)  N18.6 585.6   5. Anemia, unspecified type  D64.9 285.9   6. Hypotension, unspecified hypotension type  I95.9 458.9   7. Fall, initial encounter  W19.XXXA E888.9   8. Dysphagia, unspecified type  R13.10 787.20   9. Decreased activities of daily living (ADL)  Z78.9 V49.89   10. 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   • Eustachian tube dysfunction   • Chronic otitis media   • Pneumonia of left upper lobe due to Streptococcus (CMS/[facility])   • End stage renal failure on dialysis (CMS/[facility])   • Diabetes (CMS/[facility])   • Glaucoma   • HTN (hypertension)   • Proteinuria   • DM2 (diabetes mellitus, type 2) (CMS/[facility])   • Chronic anemia   • Acute pulmonary edema (CMS/[facility])   • Non-compliance with renal dialysis (CMS/[facility])   • Elevated troponin due to ESRD    • Hyperkalemia   • Encephalopathy, metabolic, due to uremia   • High anion gap metabolic acidosis due to uremia   • Respiratory distress   • Acute diastolic heart failure (CMS/[facility])   • Lymph node enlargement, left axillary measuring 1.1 cm -follow-up for primary care   • Nausea vomiting and diarrhea   • Acute diastolic heart failure (CMS/HCC)   • Diabetes mellitus with ophthalmic complication (CMS/[facility])   • Tobacco abuse   • Urinary tract infection with hematuria   • Pneumonia due to infectious organism   • Abnormal urine findings   • Closed fracture of right proximal humerus   • Hyponatremia   • Hypothyroidism (acquired)   • Forehead laceration secondary to fall   • Heme positive stool   • Dialysis  AV fistula malfunction, initial encounter (CMS/[facility])   • Aspiration into airway   • Bronchospasm, acute   • Altered mental status   • Protein-calorie malnutrition (CMS/[facility])   • Pupil irregular   • Sepsis (CMS/[facility])   • Hypoglycemia   • Hyponatremia   • Diarrhea   • Hypokalemia   • C. 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    • Chronic otitis media    • Diabetes (CMS/[facility])    • End stage renal disease on dialysis (CMS/[facility])    • Eustachian tube dysfunction    • GERD (gastroesophageal reflux disease)    • Glaucoma    • HTN (hypertension)    • Hyperlipidemia    • Mucoid otitis media    • Noncompliance of patient with renal dialysis (CMS/[facility])    • Tobacco abuse      Past Surgical History:   Procedure Laterality Date   • ARTERIOVENOUS FISTULA     • CATARACT EXTRACTION WITH INTRAOCULAR LENS IMPLANT     •  SECTION     • CHOLECYSTECTOMY     • MYRINGOTOMY W/ TUBES Bilateral    • MYRINGOTOMY W/ TUBES Right    • TUBAL ABDOMINAL LIGATION              OT ASSESSMENT FLOWSHEET (last 12 hours)      OT Evaluation and Treatment     Row Name 21 3761                   OT Time and Intention    Subjective Information  complains of;pain  -CJ        Document Type  therapy note (daily note)  -        Mode of Treatment  occupational therapy  -CJ        Patient Effort  good  -CJ           General Information    Existing Precautions/Restrictions  fall;non-weight bearing Pain RUE!  -           Pain Assessment    Additional Documentation  Pain Scale: Word Pre/Post-Treatment (Group)  -           Pain Scale: Word Pre/Post-Treatment    Pain: Word Scale, Pretreatment  2 - mild pain  -CJ        Posttreatment Pain Rating  2 - mild pain  -CJ        Pain Location - Side  Right  -CJ        Pain Location - Orientation  upper  -        Pain Location  shoulder  -           Bed Mobility    Comment (Bed Mobility)  up in chair!  -            Activities of Daily Living    BADL Assessment/Intervention  bathing;upper body dressing;lower body dressing  -           Bathing Assessment/Intervention    [place] Level (Bathing)  bathing skills;set up;standby assist;supervision  -        Assistive Devices (Bathing)  bath mitt;grab bar, tub/shower;hand-held shower spray hose;shower commode chair, rolling  -        Position (Bathing)  supported sitting;supported standing  -           Upper Body Dressing Assessment/Training    [place] Level (Upper Body Dressing)  doff;don;front opening garment;set up;verbal cues;minimum assist (75% patient effort)  -        Position (Upper Body Dressing)  supported sitting  -           Lower Body Dressing Assessment/Training    [place] Level (Lower Body Dressing)  doff;don;pants/bottoms;socks;set up;verbal cues;moderate assist (50% patient effort)  -        Position (Lower Body Dressing)  supported sitting;supported standing  -           Wound 04/04/21 0142 coccyx    Wound - Properties Group Placement Date: 04/04/21  -VT Placement Time: 0142 -VT Location: coccyx  -VT    Retired Wound - Properties Group Date first assessed: 04/04/21  -VT Time first assessed: 0142 -VT Location: coccyx  -VT       Positioning and Restraints    Pre-Treatment Position  sitting in chair/recliner  -        Post Treatment Position  chair  -CJ        In Chair  reclined;sitting;call light within reach;encouraged to call for assist;legs elevated  -           Progress Summary (OT)    Progress Toward Functional Goals (OT)  progress toward functional goals is good  -        Barriers to Overall Progress (OT)  Fall/NWB Rue/Pain!  -        Impairments Still Limiting Function (OT)  continue with ot poc!  -CJ          User Key  (r) = Recorded By, (t) = Taken By, (c) = Cosigned By    Initials Name Effective Dates    CJ Jacky Zhang COTA/L 06/16/21 -     VT Alyx Watson RN 08/02/16 - 06/15/21          Occupational Therapy Education                 Title: PT OT SLP Therapies (In Progress)     Topic: Occupational Therapy (In Progress)     Point: ADL training (Done)     Description:   Instruct learner(s) on proper safety adaptation and remediation techniques during self care or transfers.   Instruct in proper use of assistive devices.              Learning Progress Summary           Patient Acceptance, E,TB, VU,DU,NR by  at 6/20/2021 1357    Comment: Pt. performed adl shower/dressing with this hood/l assisting!    Acceptance, E, VU by  at 6/18/2021 1104                   Point: Home exercise program (Not Started)     Description:   Instruct learner(s) on appropriate technique for monitoring, assisting and/or progressing therapeutic exercises/activities.              Learner Progress:  Not documented in this visit.          Point: Precautions (Done)     Description:   Instruct learner(s) on prescribed precautions during self-care and functional transfers.              Learning Progress Summary           Patient Acceptance, E,TB, VU,DU,NR by  at 6/20/2021 1357    Comment: Pt. performed adl shower/dressing with this hood/l assisting!    Acceptance, E, VU by  at 6/18/2021 1104                   Point: Body mechanics (Done)     Description:   Instruct learner(s) on proper positioning and spine alignment during self-care, functional mobility activities and/or exercises.              Learning Progress Summary           Patient Acceptance, E,TB, VU,DU,NR by  at 6/20/2021 1357    Comment: Pt. performed adl shower/dressing with this hood/l assisting!    Acceptance, E, VU by  at 6/18/2021 1104                               User Key     Initials Effective Dates Name Provider Type Discipline     06/16/21 -  Jacky Zhang COTA/L Occupational Therapy Assistant OT     06/16/21 -  Kami Chapman OTR/L Occupational Therapist OT                  OT Recommendation and Plan     Progress Toward Functional  Goals (OT): progress toward functional goals is good  Plan of Care Review  Plan of Care Reviewed With: patient  Progress: improving  Outcome Summary: Pt. able to transfer chair-rolling shower chair with cga and sit in chair and perform showering, afterward able to dress with mod. assist with bottoms/socks!  Plan of Care Reviewed With: patient  Outcome Summary: Pt. able to transfer chair-rolling shower chair with cga and sit in chair and perform showering, afterward able to dress with mod. assist with bottoms/socks!    Outcome Measures     Row Name 06/20/21 1357             How much help from another is currently needed...    Putting on and taking off regular lower body clothing?  2  -CJ      Bathing (including washing, rinsing, and drying)  2  -CJ      Toileting (which includes using toilet bed pan or urinal)  3  -CJ      Putting on and taking off regular upper body clothing  3  -CJ      Taking care of personal grooming (such as brushing teeth)  4  -CJ      Eating meals  4  -CJ      AM-PAC 6 Clicks Score (OT)  18  -CJ         Functional Assessment    Outcome Measure Options  AM-PAC 6 Clicks Daily Activity (OT)  -        User Key  (r) = Recorded By, (t) = Taken By, (c) = Cosigned By    Initials Name Provider Type    Jacky Hawkins COTA/L Occupational Therapy Assistant          Time Calculation:   Time Calculation- OT     Row Name 06/20/21 1357 06/20/21 0841          Time Calculation- OT    OT Start Time  1357  -CJ  --     OT Stop Time  1436  -CJ  --     OT Time Calculation (min)  39 min  -  --     Total Timed Code Minutes- OT  39 minute(s)  -  --     OT Received On  06/20/21  -  --        Timed Charges    67261 - Gait Training Minutes   --  13  -NAOMI     74779 - OT Self Care/Mgmt Minutes  39  -CJ  --        Total Minutes    Timed Charges Total Minutes  39  -CJ  13  -NAOMI      Total Minutes  39  -CJ  13  -NAOMI       User Key  (r) = Recorded By, (t) = Taken By, (c) = Cosigned By    Initials Name Provider Type     CJ Jacky Zhang COTA/L Occupational Therapy Assistant    Domingo Whitaker, PTA Physical Therapy Assistant        Therapy Charges for Today     Code Description Service Date Service Provider Modifiers Qty    [id number] HC OT SELF CARE/MGMT/TRAIN EA 15 MIN 6/20/2021 Jacky Zhang COTA/L GO 3               TAMIKO Alvarez  6/20/2021

## 2021-06-21 NOTE — THERAPY TREATMENT NOTE
Acute Care - Physical Therapy Treatment Note  [facility]     Patient Name: Mini Gentile  : 1960  MRN: [id number]  Today's Date: 2021           PT Assessment (last 12 hours)      PT Evaluation and Treatment     Row Name 21          Physical Therapy Time and Intention    Subjective Information  complains of;weakness;fatigue  -NAOMI     Document Type  therapy note (daily note)  -NAOMI     Mode of Treatment  physical therapy  -     Row Name 21          General Information    Existing Precautions/Restrictions  fall;non-weight bearing NWB RUE due to unclear order   -     Row Name 21          Pain Scale: Word Pre/Post-Treatment    Pain: Word Scale, Pretreatment  2 - mild pain  -NAOMI     Posttreatment Pain Rating  2 - mild pain  -NAOMI     Pain Location - Side  Right  -NAOMI     Pain Location - Orientation  upper  -     Pain Location  shoulder  -     Row Name 21          Bed Mobility    Bed Mobility  sit-supine  -     Supine-Sit [place] (Bed Mobility)  verbal cues;minimum assist (75% patient effort)  -     Sit-Supine [place] (Bed Mobility)  verbal cues;contact guard  -[facility] Name 21          Transfers    Transfers  toilet transfer  -     Sit-Stand [place] (Transfers)  verbal cues;minimum assist (75% patient effort)  -     [place] Level (Toilet Transfer)  verbal cues;contact guard  -     Assistive Device (Toilet Transfer)  commode;grab bars/safety frame  -     Row Name 21          Toilet Transfer    Type (Toilet Transfer)  sit-stand;stand-sit  -[facility] Name 21          Gait/Stairs (Locomotion)    [place] Level (Gait)  verbal cues;contact guard;minimum assist (75% patient effort)  -     Assistive Device (Gait)  -- HHA   -NAOMI     Distance in Feet (Gait)  40', had to stop due to needing to use the bathroom   -NAOMI     Comment (Gait/Stairs)  pt complained of increase fatigue today and appeared more  lethargic   -NAOMI     Row Name 06/21/21 0904          Aerobic Exercise    Comment, Aerobic Exercise (Therapeutic Exercise)  sitting EOB, AROM BLE X 20 cues to perform slowly  -NAOMI     Row Name             Wound 04/04/21 0142 coccyx    Wound - Properties Group Placement Date: 04/04/21  -VT Placement Time: 0142 -VT Location: coccyx  -VT    Retired Wound - Properties Group Date first assessed: 04/04/21  -VT Time first assessed: 0142 -VT Location: coccyx  -VT    Row Name 06/21/21 0904          Positioning and Restraints    Pre-Treatment Position  in bed  -NAOMI     Post Treatment Position  bed  -NAOMI     In Bed  fowlers;call light within reach;encouraged to call for assist;side rails up x2;exit alarm on  -NAOMI       User Key  (r) = Recorded By, (t) = Taken By, (c) = Cosigned By    Initials Name Provider Type    NAOMI Domingo Dill PTA Physical Therapy Assistant    Alyx Kumar RN Registered Nurse        Physical Therapy Education                 Title: PT OT SLP Therapies (In Progress)     Topic: Physical Therapy (In Progress)     Point: Mobility training (Done)     Learning Progress Summary           Patient Acceptance, E, VU by NAOMI at 6/21/2021 0904    Comment: progression with amb    Acceptance, E, VU by NAOMI at 6/20/2021 0841    Comment: safety with mobility and amb    Acceptance, E, VU,NR by ARIES at 6/18/2021 1023    Comment: progression of PT POC, benefits of activity                   Point: Home exercise program (Not Started)     Learner Progress:  Not documented in this visit.          Point: Body mechanics (Not Started)     Learner Progress:  Not documented in this visit.          Point: Precautions (Not Started)     Learner Progress:  Not documented in this visit.                      User Key     Initials Effective Dates Name Provider Type Discipline    ARIES 08/02/16 -  Natanael Pardo PT DPT Physical Therapist PT    NAOMI 12/08/16 -  Domingo Dill PTA Physical Therapy Assistant PT              PT  Recommendation and Plan     Plan of Care Reviewed With: patient  Progress: improving  Outcome Summary: Pt required min assist for supine to sit.  Transfered sit to stand with CGA/min assist.  Amb 40' with CGA/min assist and HHA.  Decreased distance due to needing to use the bathroom.  Will continue to work with pt to increase strength and progress amb.  Outcome Measures     Row Name 06/20/21 1357             How much help from another is currently needed...    Putting on and taking off regular lower body clothing?  2  -CJ      Bathing (including washing, rinsing, and drying)  2  -CJ      Toileting (which includes using toilet bed pan or urinal)  3  -CJ      Putting on and taking off regular upper body clothing  3  -CJ      Taking care of personal grooming (such as brushing teeth)  4  -CJ      Eating meals  4  -CJ      AM-PAC 6 Clicks Score (OT)  18  -CJ         Functional Assessment    Outcome Measure Options  AM-PAC 6 Clicks Daily Activity (OT)  -        User Key  (r) = Recorded By, (t) = Taken By, (c) = Cosigned By    Initials Name Provider Type    Jacky Hawkins COTA/L Occupational Therapy Assistant           Time Calculation:   PT Charges     Row Name 06/21/21 0904             Time Calculation    Start Time  0904  -NAOMI      Stop Time  0930  -NAOMI      Time Calculation (min)  26 min  -NAOMI      PT Received On  06/21/21  -NAOMI         Time Calculation- PT    Total Timed Code Minutes- PT  26 minute(s)  -NAOMI         Timed Charges    11352 - Gait Training Minutes   10  -NAOMI      05943 - PT Therapeutic Activity Minutes  16  -NAOMI         Total Minutes    Timed Charges Total Minutes  26  -NAOMI       Total Minutes  26  -NAOMI        User Key  (r) = Recorded By, (t) = Taken By, (c) = Cosigned By    Initials Name Provider Type    Domingo Whitaker, PTA Physical Therapy Assistant        Therapy Charges for Today     Code Description Service Date Service Provider Modifiers Qty    58165522007 HC GAIT TRAINING EA 15 MIN  6/20/2021 Domingo Dill, PTA GP 1    85958420199 HC PT THER PROC EA 15 MIN 6/20/2021 Domingo Dill, PTA GP 1    61988977548 HC PT THERAPEUTIC ACT EA 15 MIN 6/21/2021 Domingo Dill, PTA GP 1    06068782447 HC GAIT TRAINING EA 15 MIN 6/21/2021 Domingo Dill, PTA GP 1          PT G-Codes  Outcome Measure Options: AM-PAC 6 Clicks Daily Activity (OT)  AM-PAC 6 Clicks Score (PT): 17  AM-PAC 6 Clicks Score (OT): 18    Domingo Dill PTA  6/21/2021

## 2021-06-21 NOTE — PAYOR COMM NOTE
"Mini Peña (61 y.o. Female) [id number]  Additional clinical please do a reconsideration of inpt denial  Pt remains in [facility] phone [phone]    Fax      Inf dz consult note     Date of Birth Social Security Number Address Home Phone MRN    1960  [id number] [place] [address] [phone] [id number]    Oriental orthodox Marital Status          Other Single       Admission Date Admission Type Admitting Provider Attending Provider Department, Room/Bed    6/16/21 Emergency Sundeep Aldridge MD [place], Glenn Riego, MD [facility] 3C, 371/1    Discharge Date Discharge Disposition Discharge Destination                       Attending Provider: Sundeep Aldridge MD    Allergies: Bupropion, Chantix [Varenicline], Gabapentin, Azithromycin, Levofloxacin, Penicillins, Sulfa Antibiotics    Isolation: Spore   Infection: C.difficile (06/17/21)   Code Status: CPR    Ht: 147.3 cm (58\")   Wt: 61.9 kg (136 lb 6.4 oz)    Admission Cmt: None   Principal Problem: C. difficile diarrhea [A04.72]                 Active Insurance as of 6/16/2021     Primary Coverage     Payor Plan Insurance Group Employer/Plan Group    [facility] MEDICARE REPLACEMENT WELLCARE MEDICARE REPLACEMENT      Payor Plan Address Payor Plan Phone Number Payor Plan Fax Number Effective Dates    [address] [address] [phone]  4/1/2021 - None Entered    [facility] [address]       Subscriber Name Subscriber Birth Date Member ID       MINI PEÑA MICHAEL 1960 [id number]           Secondary Coverage     Payor Plan Insurance Group Employer/Plan Group    [facility] WELLCARE MEDICAID      Payor Plan Address Payor Plan Phone Number Payor Plan Fax Number Effective Dates    [address] [address] [phone]  11/4/2016 - None Entered    [facility] [address]       Subscriber Name Subscriber Birth Date Member ID       STEPHANIE PEÑAANNE RODRIGUEZ 1960 [id number]           "       Emergency Contacts      (Rel.) Home Phone Work Phone Mobile Phone    Erica Bowling (Other) [phone] -- [phone]    Jose Bowling (Significant Other) [phone] -- [phone]    Arlette Barker (Daughter) -- -- [phone]    Julio Cesar Gentile (Son) [phone] -- [phone]              Current Facility-Administered Medications   Medication Dose Route Frequency Provider Last Rate Last Admin   • acetaminophen (TYLENOL) tablet 650 mg  650 mg Oral Q4H PRN Bin Guy DO   650 mg at 06/17/21 2032    Or   • acetaminophen (TYLENOL) suppository 650 mg  650 mg Rectal Q4H PRN Bin Guy DO       • aspirin EC tablet 81 mg  81 mg Oral Daily Bin Guy DO   81 mg at 06/20/21 0820   • atropine 1 % ophthalmic solution 1 drop  1 drop Left Eye Daily Bin Guy DO   1 drop at 06/20/21 0820   • brimonidine (ALPHAGAN) 0.2 % ophthalmic solution 1 drop  1 drop Left Eye BID PRN Bin Guy DO   1 drop at 06/19/21 0827   • carvedilol (COREG) tablet 6.25 mg  6.25 mg Oral BID With Meals Bin Guy DO   6.25 mg at 06/20/21 1801   • cholecalciferol (VITAMIN D3) tablet 2,000 Units  2,000 Units Oral Daily iBn Guy DO   2,000 Units at 06/20/21 0820   • dextrose (D50W) 25 g/ 50mL Intravenous Solution 25 g  25 g Intravenous Q15 Min PRN Bin Guy DO       • dextrose (GLUTOSE) oral gel 15 g  15 g Oral Q15 Min PRN Bin Guy DO       • epoetin vika-epbx (RETACRIT) injection 10,000 Units  10,000 Units Subcutaneous Once per day on Tue Thu Sat Bin Guy DO   10,000 Units at 06/19/21 0823   • famotidine (PEPCID) tablet 20 mg  20 mg Oral Once per day on Tue Thu Sat Bin Guy DO   20 mg at 06/19/21 1729   • Ferric Citrate tablet 840 mg  4 tablet Oral TID With Meals Bin Guy DO   840 mg at 06/20/21 1801   • fluticasone (FLONASE) 50 MCG/ACT nasal spray 2 spray  2 spray Nasal BID Bin Guy DO   2 spray at 06/20/21 2028   • glucagon  (human recombinant) (GLUCAGEN DIAGNOSTIC) injection 1 mg  1 mg Subcutaneous Q15 Min PRN Bin Guy DO       • insulin lispro (humaLOG) injection 2-7 Units  2-7 Units Subcutaneous TID AC Bin Guy DO   2 Units at 06/20/21 1227   • ipratropium-albuterol (DUO-NEB) nebulizer solution 3 mL  3 mL Nebulization Q4H PRN Bin Guy DO       • lactobacillus acidophilus (RISAQUAD) capsule 1 capsule  1 capsule Oral Daily Bin Guy DO   1 capsule at 06/20/21 0820   • meropenem (MERREM) 1 g/100 mL 0.9% NS VTB (mbp)  1 g Intravenous Once Fabio Glover MD       • meropenem (MERREM) 500mg/100 mL 0.9% NS IVPB (mbp)  500 mg Intravenous Q24H Fabio Glover MD   500 mg at 06/20/21 1228   • ondansetron (ZOFRAN) tablet 4 mg  4 mg Oral Q6H PRN Bin Guy DO        Or   • ondansetron (ZOFRAN) injection 4 mg  4 mg Intravenous Q6H PRN Bin Guy DO       • pravastatin (PRAVACHOL) tablet 10 mg  10 mg Oral Nightly Bin Guy DO   10 mg at 06/20/21 2028   • sertraline (ZOLOFT) tablet 25 mg  25 mg Oral Daily Bin Guy DO   25 mg at 06/20/21 0820   • sodium chloride 0.9 % bolus 100 mL  100 mL Intravenous PRN Bin Guy DO       • sodium chloride 0.9 % bolus 100 mL  100 mL Intravenous PRN Adi Gonzalez MD       • sodium chloride 0.9 % flush 10 mL  10 mL Intravenous PRN Bin Guy DO       • sodium chloride 0.9 % flush 10 mL  10 mL Intravenous Q12H Bin Guy DO   10 mL at 06/20/21 2029   • sodium chloride 0.9 % flush 10 mL  10 mL Intravenous PRN Bin Guy DO       • timolol (TIMOPTIC) 0.5 % ophthalmic solution 1 drop  1 drop Left Eye Daily Bin Guy DO   1 drop at 06/20/21 0820   • vancomycin oral solution reconstituted 125 mg  125 mg Oral Q6H Bin Guy DO   125 mg at 06/21/21 0611        Consult Notes (last 7 days) (Notes from 06/14/21 through 06/21/21)      Vimal Jewell, PharmD at 06/17/21 0502          Pharmacy Dosing Service  " Antimicrobial Dosing  Cefepime & Vancomycin    Assessment/Action/Plan:  Pharmacy to dose Cefepime and Vancomycin requests for dialysis patient for Sepsis. Initiated the following tentative dosing regimens until nephrology determines dialysis to be utilized while hospitalized.    Initiated renally-adjusted/HD patient extended-infusion dosing of Cefepime 2 gm IV over 4 hours every 24 hours, following initial dose given in ER. Current end date/final dose: 6/23/21 at 1800.    Initiated Vancomycin 750 mg IV every 48 hours, following a 1,000 mg dose given in ER. No levels ordered at this time. Current end date/final dose: 6/24/21 at 2000.    Pharmacy will continue to monitor daily and make further adjustment(s) accordingly.     Subjective:  Mini Gentile is a 61 y.o. female with a  \"Pharmacy to Dose Cefepime and Vancomycin\" consult for the treatment of Sepsis , day 2 of treatment.    Objective:  Ht: 147.3 cm (58\"); Wt: 69.6 kg (153 lb 7 oz)  Estimated Creatinine Clearance: 12.6 mL/min (A) (by C-G formula based on SCr of 4.06 mg/dL (H)).   Creatinine   Date Value Ref Range Status   06/16/2021 4.06 (H) 0.57 - 1.00 mg/dL Final   06/16/2021 3.68 (H) 0.57 - 1.00 mg/dL Final      Lab Results   Component Value Date    WBC 6.65 06/16/2021    WBC 5.45 06/16/2021      Baseline culture results:  Microbiology Results (last 10 days)       Procedure Component Value - Date/Time    Clostridium Difficile Toxin - Stool, Per Rectum [[id number]]  (Abnormal) Collected: 06/17/21 0212    Lab Status: Final result Specimen: Stool from Per Rectum Updated: 06/17/21 0322    Narrative:      The following orders were created for panel order Clostridium Difficile Toxin - Stool, Per Rectum.  Procedure                               Abnormality         Status                     ---------                               -----------         ------                     Clostridium Difficile To...[[id number]]  Abnormal            Final result             "     Please view results for these tests on the individual orders.    Gastrointestinal Panel, PCR - Stool, Per Rectum [[id number]]  (Normal) Collected: 06/17/21 0212    Lab Status: Final result Specimen: Stool from Per Rectum Updated: 06/17/21 0342     Campylobacter Not Detected     Plesiomonas shigelloides Not Detected     Salmonella Not Detected     Vibrio Not Detected     Vibrio cholerae Not Detected     Yersinia enterocolitica Not Detected     Enteroaggregative E. coli (EAEC) Not Detected     Enteropathogenic E. coli (EPEC) Not Detected     Enterotoxigenic E. coli (ETEC) lt/st Not Detected     Shiga-like toxin-producing E. coli (STEC) stx1/stx2 Not Detected     Shigella/Enteroinvasive E. coli (EIEC) Not Detected     Cryptosporidium Not Detected     Cyclospora cayetanensis Not Detected     Entamoeba histolytica Not Detected     Giardia lamblia Not Detected     Adenovirus F40/41 Not Detected     Astrovirus Not Detected     Norovirus GI/GII Not Detected     Rotavirus A Not Detected     Sapovirus (I, II, IV or V) Not Detected    Narrative:      If Aeromonas, Staphylococcus aureus or Bacillus cereus are suspected, please order [id number]: Stool Culture, Aeromonas, S aureus, B Cereus.    Clostridium Difficile Toxin, PCR - Stool, Per Rectum [[id number]]  (Abnormal) Collected: 06/17/21 0212    Lab Status: Final result Specimen: Stool from Per Rectum Updated: 06/17/21 0322     C. Difficile Toxins by PCR Positive    Narrative:      Performance characteristics of test not established for patients <2 years of age.    COVID-19,[facility] IN-HOUSE,Nasal Swab No Transport Media 3-4 HR TAT - Swab, Nasal Cavity [[id number]]  (Normal) Collected: 06/16/21 1813    Lab Status: Final result Specimen: Swab from Nasal Cavity Updated: 06/16/21 1912     COVID19 Not Detected    Narrative:      Fact sheet for providers: https://www.fda.gov/media/416531/download     Fact sheet for patients: https://www.fda.gov/media/500122/download    Test performed  by PCR.    Consider negative results in combination with clinical observations, patient history, and epidemiological information.    COVID PRE-OP / PRE-PROCEDURE SCREENING ORDER (NO ISOLATION) - Swab, Nasal Cavity [[id number]]  (Normal) Collected: 06/08/21 1613    Lab Status: Final result Specimen: Swab from Nasal Cavity Updated: 06/08/21 1717    Narrative:      The following orders were created for panel order COVID PRE-OP / PRE-PROCEDURE SCREENING ORDER (NO ISOLATION) - Swab, Nasal Cavity.  Procedure                               Abnormality         Status                     ---------                               -----------         ------                     COVID-19,[facility]OB[facility]...[[id number]]  Normal              Final result                 Please view results for these tests on the individual orders.    COVID-19,[facility] IN-HOUSE,Nasal Swab No Transport Media 3-4 HR TAT - Swab, Nasal Cavity [[id number]]  (Normal) Collected: 06/08/21 1613    Lab Status: Final result Specimen: Swab from Nasal Cavity Updated: 06/08/21 1717     COVID19 Not Detected    Narrative:      Fact sheet for providers: https://www.fda.gov/media/854679/download     Fact sheet for patients: https://www.fda.gov/media/431651/download    Test performed by PCR.    Consider negative results in combination with clinical observations, patient history, and epidemiological information.            Vimal Jewell, Marisol  06/17/21 04:57 CDT      Electronically signed by Vimal Jewell, PharmD at 06/17/21 0502     Gomez Lemos, APRN at 06/17/21 1003      Consult Orders    1. Inpatient Nephrology Consult [[id number]] ordered by Tejinder Kent MD at 06/17/21 0220          Attestation signed by Adi Gonzalez MD at 06/17/21 4735    I have independently interviewed and examined the patient and reviewed the laboratory, imaging, notes and all other records as available.  I have discussed key elements of the care plan with the APRN and  agree with the findings and care plan documented above except as noted.      Subjective:  Initially consulted for end stage renal disease. Maintenance hemodialysis on Tuesday Thursday Saturday at [facility]. Fairly noncompliant with dialysis treatments. She was just discharged from [facility] on 6/8. Unknown if she has been going to HD treatments since then.  Presented to ER after she was noted to be confused at nursing home.  Patient initially minimally responsive but is somewhat more interactive today. Still lethargic but will wake and answer some questions. Complaints of diarrhea recently, and stool has tested positive for C Diff.  No issues with HD today.  No events per nursing.    Objective:  Vitals/labs/studies/notes all reviewed  Exam independently verified with additional comments or findings as noted:  Ext: no edema    Assessment:  End stage renal disease on HD TTS  Metabolic encephalopathy  Sepsis  Type 2 diabetes with hypoglycemia  Hypertension with periods of hypotension  Anemia of CKD  COPD  Hypokalemia  C Diff colitis     Plan:  HD today and per routine schedule  Monitor labs  Abx per primary  D/w pt/family/nursing    Adi Gonzalez MD  6/17/2021  23:25 CDT                    Nephrology ([facility]) Consult Note      Patient:  Mini Gentile  YOB: 1960  Date of Service: 6/17/2021  MRN: [id number]   Acct: [id number]   Primary Care Physician: Bharati Dahl APRN  Advance Directive:   Code Status and Medical Interventions:   Ordered at: 06/17/21 0220     Code Status:    CPR     Medical Interventions (Level of Support Prior to Arrest):    Full     Admit Date: 6/16/2021       Hospital Day: 1  Referring Provider: Tejinder Kent MD      Patient personally seen and examined.  Complete chart including Consults, Notes, Operative Reports, Labs, Cardiology, and Radiology studies reviewed as able.        Subjective:  Mini Gentile is a 61  y.o. female  whom we were consulted for end stage renal disease. Maintenance hemodialysis on Tuesday Thursday Saturday at [facility]. Fairly noncompliant with dialysis treatments. She was just discharged from [facility] on 6/08. Unknown if she has been going to HD treatments since then.  Presented to ER after she was noted to be confused at nursing home.  Patient initially minimally responsive but is somewhat more interactive today. Still lethargic but will wake and answer some questions. Complaints of diarrhea recently, and stool has tested positive for C Diff.    Allergies:  Bupropion, Chantix [varenicline], Gabapentin, Azithromycin, Levofloxacin, Penicillins, and Sulfa antibiotics    Home Meds:  Medications Prior to Admission   Medication Sig Dispense Refill Last Dose   • albuterol sulfate  (90 Base) MCG/ACT inhaler Inhale 2 puffs Every 4 (Four) Hours As Needed for Wheezing. (Patient taking differently: Inhale 2 puffs Every 6 (Six) Hours As Needed for Wheezing.) 18 g 0    • aspirin 81 MG EC tablet Take 81 mg by mouth Daily.      • atropine 1 % ophthalmic solution Administer 1 drop into the left eye Daily.      • brimonidine (ALPHAGAN) 0.2 % ophthalmic solution Administer 1 drop into the left eye 2 (Two) Times a Day As Needed.      • carvedilol (COREG) 6.25 MG tablet Take 6.25 mg by mouth 2 (Two) Times a Day With Meals.      • cholecalciferol (VITAMIN D3) 1000 units tablet Take 2,000 Units by mouth Daily.      • dorzolamide (TRUSOPT) 2 % ophthalmic solution Administer 1 drop into the left eye 2 (Two) Times a Day.      • epoetin vika-epbx (RETACRIT) 00935 UNIT/ML injection Inject 1 mL under the skin into the appropriate area as directed 3 (Three) Times a Week. Indications: ESRD on Dialysis 6.6 mL     • famotidine (PEPCID) 20 MG tablet Take 1 tablet by mouth Daily.      • Ferric Citrate 1  MG(Fe) tablet Take 4 tablets by mouth 3 (Three) Times a Day With Meals. 270 tablet     • fluticasone  (FLONASE) 50 MCG/ACT nasal spray 2 sprays into the nostril(s) as directed by provider 2 (Two) Times a Day.      • insulin lispro (humaLOG) 100 UNIT/ML injection Inject 2-7 Units under the skin into the appropriate area as directed 3 (Three) Times a Day Before Meals.  12    • ipratropium-albuterol (DUO-NEB) 0.5-2.5 mg/3 ml nebulizer Take 3 mL by nebulization Every 4 (Four) Hours As Needed for Wheezing or Shortness of Air.      • lactobacillus acidophilus (RISAQUAD) capsule capsule Take 1 capsule by mouth Daily. 30 capsule 2    • metoclopramide (REGLAN) 5 MG tablet Take 1 tablet by mouth 4 (Four) Times a Day Before Meals & at Bedtime.      • ondansetron (ZOFRAN) 4 MG tablet Take 1 tablet by mouth Every 6 (Six) Hours As Needed for Nausea or Vomiting. 24 tablet 2    • pravastatin (PRAVACHOL) 10 MG tablet Take 10 mg by mouth Every Night.      • sertraline (ZOLOFT) 25 MG tablet Take 1 tablet by mouth Daily.      • timolol (TIMOPTIC) 0.5 % ophthalmic solution Administer 1 drop into the left eye Every Morning.  12        Medicines:  Current Facility-Administered Medications   Medication Dose Route Frequency Provider Last Rate Last Admin   • acetaminophen (TYLENOL) tablet 650 mg  650 mg Oral Q4H PRN Tejinder Kent MD        Or   • acetaminophen (TYLENOL) suppository 650 mg  650 mg Rectal Q4H PRN Tejinder Kent MD       • aspirin EC tablet 81 mg  81 mg Oral Daily Tejinder Kent MD       • atropine 1 % ophthalmic solution 1 drop  1 drop Left Eye Daily Tejinder Kent MD       • brimonidine (ALPHAGAN) 0.2 % ophthalmic solution 1 drop  1 drop Left Eye BID PRN Tejinder Kent MD       • cholecalciferol (VITAMIN D3) tablet 2,000 Units  2,000 Units Oral Daily Tejinder Kent MD       • dextrose (D50W) 25 g/ 50mL Intravenous Solution 25 g  25 g Intravenous Q15 Min PRN Tejinder Kent MD       • dextrose (GLUTOSE) oral gel 15 g  15 g Oral Q15 Min PRN Tejinder Kent MD       • epoetin vika-epbx (RETACRIT)  injection 10,000 Units  10,000 Units Subcutaneous Once per day on Tue Thu Sat Tejinder Kent MD       • famotidine (PEPCID) tablet 20 mg  20 mg Oral Daily Tejinder Kent MD       • Ferric Citrate tablet 840 mg  4 tablet Oral TID With Meals Tejinder Kent MD       • fluticasone (FLONASE) 50 MCG/ACT nasal spray 2 spray  2 spray Nasal BID Tejinder Kent MD       • glucagon (human recombinant) (GLUCAGEN DIAGNOSTIC) injection 1 mg  1 mg Subcutaneous Q15 Min PRN Tejinder Kent MD       • insulin lispro (humaLOG) injection 2-7 Units  2-7 Units Subcutaneous TID AC Tejinder Kent MD       • ipratropium-albuterol (DUO-NEB) nebulizer solution 3 mL  3 mL Nebulization Q4H PRN Tejinder Kent MD       • lactated ringers infusion  50 mL/hr Intravenous Continuous Tejinder Kent MD 50 mL/hr at 06/17/21 0308 50 mL/hr at 06/17/21 0308   • lactobacillus acidophilus (RISAQUAD) capsule 1 capsule  1 capsule Oral Daily Tejinder Kent MD       • ondansetron (ZOFRAN) tablet 4 mg  4 mg Oral Q6H PRN Tejinder Kent MD        Or   • ondansetron (ZOFRAN) injection 4 mg  4 mg Intravenous Q6H PRN Tejinder Kent MD       • pravastatin (PRAVACHOL) tablet 10 mg  10 mg Oral Nightly Tejinder Kent MD       • sertraline (ZOLOFT) tablet 25 mg  25 mg Oral Daily Tejinder Kent MD       • sodium chloride 0.9 % flush 10 mL  10 mL Intravenous PRN Tejinder Kent MD       • sodium chloride 0.9 % flush 10 mL  10 mL Intravenous Q12H Tejinder Kent MD       • sodium chloride 0.9 % flush 10 mL  10 mL Intravenous PRN Tejinder Kent MD       • timolol (TIMOPTIC) 0.5 % ophthalmic solution 1 drop  1 drop Left Eye Daily Tejinder Kent MD       • vancomycin oral solution reconstituted 125 mg  125 mg Oral Q6H Bin Guy DO           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    • Chronic otitis media    • Diabetes (CMS/HCC)    •  End stage renal disease on dialysis (CMS/[facility])    • Eustachian tube dysfunction    • GERD (gastroesophageal reflux disease)    • Glaucoma    • HTN (hypertension)    • Hyperlipidemia    • Mucoid otitis media    • Noncompliance of patient with renal dialysis (CMS/[facility])    • Tobacco abuse        Past Surgical History:  Past Surgical History:   Procedure Laterality Date   • ARTERIOVENOUS FISTULA     • CATARACT EXTRACTION WITH INTRAOCULAR LENS IMPLANT     •  SECTION     • CHOLECYSTECTOMY     • MYRINGOTOMY W/ TUBES Bilateral    • MYRINGOTOMY W/ TUBES Right    • TUBAL ABDOMINAL LIGATION         Family History  Family History   Problem Relation Age of Onset   • Heart disease Mother    • Diabetes Father    • Colon cancer Neg Hx    • Colon polyps Neg Hx        Social History  Social History     Socioeconomic History   • Marital status: Single     Spouse name: Not on file   • Number of children: Not on file   • Years of education: Not on file   • Highest education level: Not on file   Tobacco Use   • Smoking status: Current Every Day Smoker     Packs/day: 2.00     Years: 6.00     Pack years: 12.00     Types: Cigarettes   • Smokeless tobacco: Never Used   Vaping Use   • Vaping Use: Never used   Substance and Sexual Activity   • Alcohol use: No   • Drug use: No   • Sexual activity: Defer         Review of Systems:  History obtained from chart review and the patient  General ROS: No fever or chills  Respiratory ROS: No cough, shortness of breath, wheezing  Cardiovascular ROS: No chest pain or palpitations  Gastrointestinal ROS: positive for - diarrhea  No abdominal pain or melena  Genito-Urinary ROS: No dysuria or hematuria  14 point ROS reviewed with the patient and negative except as noted above and in the HPI unless unable to obtain.    Objective:  Patient Vitals for the past 24 hrs:   BP Temp Temp src Pulse Resp SpO2 Height Weight   21 0930 108/53 -- -- 71 -- 96 % -- --   21 0900 116/71 -- -- 68 -- 97 % --  "--   06/17/21 0830 101/47 -- -- 65 -- 97 % -- --   06/17/21 0800 98/56 -- -- 67 -- 96 % -- --   06/17/21 0730 133/58 -- -- 67 -- 94 % -- --   06/17/21 0720 (!) 120/103 95.5 °F (35.3 °C) Axillary 72 20 98 % -- --   06/17/21 0700 -- -- -- 70 -- 92 % -- --   06/17/21 0600 108/71 98.4 °F (36.9 °C) Oral 74 14 94 % -- --   06/17/21 0500 120/60 -- -- 71 -- 98 % -- --   06/17/21 0400 114/54 -- -- 69 -- 95 % -- --   06/17/21 0300 99/59 99.5 °F (37.5 °C) Oral 71 14 93 % -- 69.6 kg (153 lb 7 oz)   06/17/21 0201 -- -- -- 75 -- 96 % -- --   06/17/21 0200 92/53 -- -- -- -- -- -- --   06/17/21 0115 112/51 -- -- -- -- -- -- --   06/17/21 0114 -- -- -- 83 -- 95 % -- --   06/16/21 2245 103/52 -- -- 80 -- 94 % -- --   06/16/21 2215 126/59 -- -- 86 -- 96 % -- --   06/16/21 2130 109/51 -- -- 78 -- 95 % -- --   06/16/21 2030 100/44 -- -- 82 -- 95 % -- --   06/16/21 1933 (!) 89/47 -- -- 88 -- 95 % -- --   06/16/21 1845 114/68 -- -- 93 -- 96 % -- --   06/16/21 1806 -- -- -- -- -- 95 % -- --   06/16/21 1748 -- -- -- 90 -- -- -- --   06/16/21 1747 99/48 -- -- -- -- -- -- --   06/16/21 1704 -- 100.2 °F (37.9 °C) Oral -- -- -- -- --   06/16/21 1703 -- -- -- 92 -- 94 % -- --   06/16/21 1700 127/53 -- -- 97 22 93 % 147.3 cm (58\") 65.1 kg (143 lb 8 oz)       Intake/Output Summary (Last 24 hours) at 6/17/2021 1004  Last data filed at 6/16/2021 2043  Gross per 24 hour   Intake 300 ml   Output --   Net 300 ml     General: lethargic  Chest:  clear to auscultation bilaterally without respiratory distress  CVS: regular rate and rhythm  Abdominal: soft, nontender, positive bowel sounds  Extremities: no cyanosis or edema  Skin: warm and dry without rash      Labs:  Results from last 7 days   Lab Units 06/17/21  0609 06/16/21  1706 06/16/21  1245   WBC 10*3/mm3 8.52 6.65 5.45   HEMOGLOBIN g/dL 9.9* 8.5* 8.9*   HEMATOCRIT % 32.3* 27.3* 28.0*   PLATELETS 10*3/mm3 130* 145 156         Results from last 7 days   Lab Units 06/17/21  0609 06/16/21  1706 " 06/16/21  1245   SODIUM mmol/L 135* 137 137   POTASSIUM mmol/L 3.8 3.1* 3.1*   CHLORIDE mmol/L 94* 95* 94*   CO2 mmol/L 28.0 28.0 28.0   BUN mg/dL 30* 25* 25*   CREATININE mg/dL 4.26* 4.06* 3.68*   CALCIUM mg/dL 9.2 9.1 9.0   BILIRUBIN mg/dL 0.8 0.6 0.6   ALK PHOS U/L 200* 199* 222*   ALT (SGPT) U/L 18 19 20   AST (SGOT) U/L 29 36* 36*   GLUCOSE mg/dL 97 113* 150*       Radiology:   Imaging Results (Last 72 Hours)     Procedure Component Value Units Date/Time    XR Femur 2 View Right [[id number]] Collected: 06/16/21 1933     Updated: 06/16/21 1937    Narrative:      EXAMINATION: XR FEMUR 2 VW RIGHT-     6/16/2021 6:40 PM CDT     HISTORY: rt leg pain, fall     Right femur, 4 views.     No femur fracture is seen.     The hip and knee are normal, to the extent visualized.     No soft tissue abnormality is seen.       Impression:      1. No acute bony abnormality.              This report was finalized on 06/16/2021 19:34 by Dr. Dharmesh Zuleta MD.    XR Pelvis 1 or 2 View [[id number]] Collected: 06/16/21 1931     Updated: 06/16/21 1936    Narrative:      EXAMINATION: XR PELVIS 1 OR 2 VW-     6/16/2021 6:40 PM CDT     HISTORY: fall, pain     Pelvis, one view.     Mildly displaced superior and inferior right pubic ramus fractures are  not acute and are seen on a CT exam from 6/1/2021.     . SI joints are symmetric.     No sacral fracture is seen.     No hip fracture or dislocation.     Summary:  1. Old fractures of the right superior and inferior pubic ramus.  2. No acute fracture is seen.  This report was finalized on 06/16/2021 19:33 by Dr. Dharmesh Zuleta MD.    XR Hand 3+ View Right [[id number]] Collected: 06/16/21 1930     Updated: 06/16/21 1934    Narrative:      EXAMINATION: XR HAND 3+ VW RIGHT-     6/16/2021 6:40 PM CDT     HISTORY: Fall injury. Right hand pain.     Right hand, 3 views.     Osteopenia.     Chronic appearing small soft tissue calcifications are seen adjacent to  the third and fourth MCP joints.      Metacarpals and fingers show no sign of acute fracture.     The distal aspect of the second finger and third finger are obscured by  a Sensor and wire on some of the images.     Carpal bones align normally.     Summary:  1. No acute fracture is seen.     This report was finalized on 06/16/2021 19:31 by Dr. Dharmesh Zuleta MD.    XR Wrist 3+ View Right [[id number]] Collected: 06/16/21 1929     Updated: 06/16/21 1932    Narrative:      EXAMINATION: XR WRIST 3+ VW RIGHT-     6/16/2021 6:40 PM CDT     HISTORY: Fall injury. Right wrist pain.     Right wrist, 3 views.     Osteopenia.     Diffuse arterial calcification.     Intact distal radius and ulna.     Carpal bones align normally.     Proximal metacarpals are intact.     Summary:  1. No acute fracture.     This report was finalized on 06/16/2021 19:29 by Dr. Dharmesh Zuleta MD.    XR Chest 1 View [[id number]] Collected: 06/16/21 1927     Updated: 06/16/21 1932    Narrative:      EXAMINATION: XR CHEST 1 VW-     6/16/2021 6:40 PM CDT     HISTORY: fever, AMS     1 view chest x-ray compared with 6/7/2021.     Stable heart and mediastinum.  Aortic arch calcification.     Interval removal of a right jugular central line.     There is increased interstitial prominence compatible with interstitial  edema and early heart failure.     There is an unchanged appearance of a moderate right pleural effusion.     Nonhealed fracture of the right humeral neck.  This finding was demonstrated on a radiograph from 4/1/2021.     Summary:  1. Increased interstitial prominence compatible with interstitial edema  and early failure.  2. Unchanged or minimally decreased moderate right pleural effusion.  This report was finalized on 06/16/2021 19:28 by Dr. Dharmesh Zuleta MD.    CT Cervical Spine Without Contrast [[id number]] Collected: 06/16/21 1827     Updated: 06/16/21 1832    Narrative:      EXAMINATION: CT CERVICAL SPINE WO CONTRAST-      6/16/2021 6:13 PM CDT     HISTORY: Fall injury. Neck  pain. Altered mental status.     In order to have a CT radiation dose as low as reasonably achievable  Automated Exposure Control was utilized for adjustment of the mA and/or  KV according to patient size.     DLP in mGycm= 593.     Noncontrast cervical spine CT.     Comparison is made with cervical spine CT imaging from 3/26/2021.     The patient was unable to cooperate and the images are affected by  motion.     Mild right convex curvature on the coronal sequence is negative and  probably is positional.     The lateral view shows appropriate lordotic curvature with no vertebral  body malalignment.     Prevertebral soft tissues are normal.     Facet joints align normally.     Summary:  1. No acute fracture is seen.                                   This report was finalized on 06/16/2021 18:29 by Dr. Dharmesh Zuleta MD.    CT Head Without Contrast [[id number]] Collected: 06/16/21 1826     Updated: 06/16/21 1830    Narrative:      EXAMINATION: CT HEAD WO CONTRAST-      6/16/2021 6:13 PM CDT     HISTORY: Altered mental status.     In order to have a CT radiation dose as low as reasonably achievable  Automated Exposure Control was utilized for adjustment of the mA and/or  KV according to patient size.     DLP in mGycm= 594.     Noncontrast head CT.     Comparison is made with a noncontrast head CT from 6/1/2021.     Axial, sagittal, and coronal noncontrast CT imaging of the head.     The visualized paranasal sinuses are clear.     The brain and ventricles have an age appropriate appearance.   There is no hemorrhage or mass-effect.   No acute infarction is seen.     No calvarial abnormality.       Impression:      1. No acute intracranial abnormality is seen.  2. Stable chronic change.                                         This report was finalized on 06/16/2021 18:27 by Dr. Dharmesh Zuleta MD.          Culture:  No results found for: BLOODCX, URINECX, WOUNDCX, MRSACX, RESPCX, STOOLCX      Assessment   1.  End stage renal  disease on HD TTS  2.  Metabolic encephalopathy  3.  Sepsis  4.  Type 2 diabetes with hypoglycemia  5.  Hypertension  6.  Anemia of CKD  7.  COPD  7.  Hypokalemia--improved  9.  C Diff colitis    Plan:  1.  Dialysis is due today  2.  Monitor labs  3.  Further assessment and plan pending Dr Gonzalez's evaluation of patient      Thank you for the consult, we appreciate the opportunity to provide care to your patients.  Feel free to contact me if I can be of any further assistance.      ANTHONY Reid  6/17/2021  10:04 CDT    Electronically signed by Adi Gonzalez MD at 06/17/21 2331     Krissy Carballo APRN at 06/18/21 1006              [facility]  INPATIENT WOUND CONSULTATION    Today's Date: 06/18/21    Patient Name: Mini Gentile  MRN: [id number]  CSN: [id number]  PCP: Bharati Dahl APRN  Referring Provider: Bin Guy DO  Attending Provider: Bin Guy DO  Length of Stay: 2    SUBJECTIVE   Chief Complaint: Wound of coccyx/buttock    HPI: Mini Gentile, a 61 y.o.female, presents with a past medical hisotry of diabetes, end stage renal disease on dialysis, tobacco abuse, and hyper lipidemia.  Full past medical history is listed below.  Patient resides at [place] [facility] where she was transferred from due to mental status change.  Patient was found laying face down in her bed.  She was noted to have diarrhea and hypotension concerning for sepsis.  She was found to have C. Diff.      Inpatient wound care is consulted due to a wound of the coccyx and buttocks area.  On assessment, all areas appear to be blanching at this time.  There are no active pressure injuries at this time.  There are signs of healed pressure injuries with scarring and blanchable skin.  The patient is at high risk for skin breakdown due to moisture and mobility and activity levels.      Visit Dx:    ICD-10-CM ICD-9-CM   1. Altered mental status, unspecified altered mental status type   R41.82 780.97   2. Hypoxia  R09.02 799.02   3. Elevated troponin  R77.8 790.6   4. End stage renal disease (CMS/[facility])  N18.6 585.6   5. Anemia, unspecified type  D64.9 285.9   6. Hypotension, unspecified hypotension type  I95.9 458.9   7. Fall, initial encounter  W19.XXXA E888.9   8. 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   • Eustachian tube dysfunction   • Chronic otitis media   • Pneumonia of left upper lobe due to Streptococcus (CMS/[facility])   • End stage renal failure on dialysis (CMS/[facility])   • Diabetes (CMS/[facility])   • Glaucoma   • HTN (hypertension)   • Proteinuria   • DM2 (diabetes mellitus, type 2) (CMS/[facility])   • Chronic anemia   • Acute pulmonary edema (CMS/[facility])   • Non-compliance with renal dialysis (CMS/[facility])   • Elevated troponin due to ESRD    • Hyperkalemia   • Encephalopathy, metabolic, due to uremia   • High anion gap metabolic acidosis due to uremia   • Respiratory distress   • Acute diastolic heart failure (CMS/[facility])   • Lymph node enlargement, left axillary measuring 1.1 cm -follow-up for primary care   • Nausea vomiting and diarrhea   • Acute diastolic heart failure (CMS/[facility])   • Diabetes mellitus with ophthalmic complication (CMS/[facility])   • Tobacco abuse   • Urinary tract infection with hematuria   • Pneumonia due to infectious organism   • Abnormal urine findings   • Closed fracture of right proximal humerus   • Hyponatremia   • Hypothyroidism (acquired)   • Forehead laceration secondary to fall   • Heme positive stool   • Dialysis AV fistula malfunction, initial encounter (CMS/[facility])   • Aspiration into airway   • Bronchospasm, acute   • Altered mental status   • Protein-calorie malnutrition (CMS/[facility])   • Pupil irregular   • Sepsis (CMS/[facility])   • Hypoglycemia   • Hyponatremia   • Diarrhea   • Hypokalemia   • C. difficile diarrhea   • Bacteremia due to Pseudomonas       History:   Past Medical History:    Diagnosis Date   • 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    • Chronic otitis media    • Diabetes (CMS/[facility])    • End stage renal disease on dialysis (CMS/[facility])    • Eustachian tube dysfunction    • GERD (gastroesophageal reflux disease)    • Glaucoma    • HTN (hypertension)    • Hyperlipidemia    • Mucoid otitis media    • Noncompliance of patient with renal dialysis (CMS/[facility])    • Tobacco abuse      Past Surgical History:   Procedure Laterality Date   • ARTERIOVENOUS FISTULA     • CATARACT EXTRACTION WITH INTRAOCULAR LENS IMPLANT     •  SECTION     • CHOLECYSTECTOMY     • MYRINGOTOMY W/ TUBES Bilateral    • MYRINGOTOMY W/ TUBES Right    • TUBAL ABDOMINAL LIGATION       Social History     Socioeconomic History   • Marital status: Single     Spouse name: Not on file   • Number of children: Not on file   • Years of education: Not on file   • Highest education level: Not on file   Tobacco Use   • Smoking status: Current Every Day Smoker     Packs/day: 2.00     Years: 6.00     Pack years: 12.00     Types: Cigarettes   • Smokeless tobacco: Never Used   Vaping Use   • Vaping Use: Never used   Substance and Sexual Activity   • Alcohol use: No   • Drug use: No   • Sexual activity: Defer     Family History   Problem Relation Age of Onset   • Heart disease Mother    • Diabetes Father    • Colon cancer Neg Hx    • Colon polyps Neg Hx        Allergies:  Allergies   Allergen Reactions   • Bupropion Nausea Only   • Chantix [Varenicline] Other (See Comments)     Does not remember   • Gabapentin Hallucinations     hallucinations   • Azithromycin Rash   • Levofloxacin Rash   • Penicillins Rash   • Sulfa Antibiotics Rash       Medications:    Current Facility-Administered Medications:   •  acetaminophen (TYLENOL) tablet 650 mg, 650 mg, Oral, Q4H PRN, 650 mg at 21 **OR** acetaminophen (TYLENOL) suppository 650 mg, 650 mg, Rectal, Q4H PRN, Tejinder Kent MD  •  albumin human 25 % IV SOLN 12.5 g, 12.5 g,  Intravenous, PRN, Adi Gonzalez MD  •  aspirin EC tablet 81 mg, 81 mg, Oral, Daily, Tejinder Kent MD, 81 mg at 06/18/21 0921  •  atropine 1 % ophthalmic solution 1 drop, 1 drop, Left Eye, Daily, Tejinder Kent MD, 1 drop at 06/18/21 0922  •  brimonidine (ALPHAGAN) 0.2 % ophthalmic solution 1 drop, 1 drop, Left Eye, BID PRN, Tejinder Kent MD, 1 drop at 06/18/21 0923  •  [START ON 6/19/2021] cefepime (MAXIPIME) 500 mg in sodium chloride 0.9 % 100 mL IVPB, 500 mg, Intravenous, Q24H, Bin Guy DO  •  cholecalciferol (VITAMIN D3) tablet 2,000 Units, 2,000 Units, Oral, Daily, Tejinder Kent MD, 2,000 Units at 06/18/21 0921  •  dextrose (D50W) 25 g/ 50mL Intravenous Solution 25 g, 25 g, Intravenous, Q15 Min PRN, Tejinder Kent MD  •  dextrose (GLUTOSE) oral gel 15 g, 15 g, Oral, Q15 Min PRN, Tejinder Kent MD  •  epoetin vika-epbx (RETACRIT) injection 10,000 Units, 10,000 Units, Subcutaneous, Once per day on Tue Thu Sat, Tejinder Kent MD, 10,000 Units at 06/17/21 1120  •  famotidine (PEPCID) tablet 20 mg, 20 mg, Oral, Daily, Tejinder Kent MD, 20 mg at 06/18/21 0921  •  Ferric Citrate tablet 840 mg, 4 tablet, Oral, TID With Meals, Tejinder Kent MD, 840 mg at 06/18/21 0920  •  fluticasone (FLONASE) 50 MCG/ACT nasal spray 2 spray, 2 spray, Nasal, BID, Tejinder Kent MD, 2 spray at 06/18/21 0922  •  glucagon (human recombinant) (GLUCAGEN DIAGNOSTIC) injection 1 mg, 1 mg, Subcutaneous, Q15 Min PRN, Tejinder Kent MD  •  insulin lispro (humaLOG) injection 2-7 Units, 2-7 Units, Subcutaneous, TID AC, Tejinder Kent MD  •  ipratropium-albuterol (DUO-NEB) nebulizer solution 3 mL, 3 mL, Nebulization, Q4H PRN, Tejinder Kent MD  •  lactobacillus acidophilus (RISAQUAD) capsule 1 capsule, 1 capsule, Oral, Daily, Tejinder Kent MD, 1 capsule at 06/18/21 0921  •  ondansetron (ZOFRAN) tablet 4 mg, 4 mg, Oral, Q6H PRN **OR** ondansetron (ZOFRAN) injection 4  mg, 4 mg, Intravenous, Q6H PRN, Tejinder Kent MD  •  pravastatin (PRAVACHOL) tablet 10 mg, 10 mg, Oral, Nightly, Tejinder Kent MD, 10 mg at 06/17/21 2032  •  sertraline (ZOLOFT) tablet 25 mg, 25 mg, Oral, Daily, Tejinder Kent MD, 25 mg at 06/18/21 0921  •  sodium chloride 0.9 % bolus 100 mL, 100 mL, Intravenous, PRN, Adi Gonzalez MD  •  [COMPLETED] Insert peripheral IV, , , Once **AND** sodium chloride 0.9 % flush 10 mL, 10 mL, Intravenous, PRN, Tejinder Kent MD  •  sodium chloride 0.9 % flush 10 mL, 10 mL, Intravenous, Q12H, Tejinder Kent MD, 10 mL at 06/17/21 2033  •  sodium chloride 0.9 % flush 10 mL, 10 mL, Intravenous, PRN, Tejinder Kent MD  •  timolol (TIMOPTIC) 0.5 % ophthalmic solution 1 drop, 1 drop, Left Eye, Daily, Tejinder Kent MD, 1 drop at 06/18/21 0923  •  vancomycin oral solution reconstituted 125 mg, 125 mg, Oral, Q6H, Bin Gyu DO, 125 mg at 06/18/21 0537    Review of Systems:  Review of Systems   Constitutional: Negative for chills and fever.   HENT: Negative for rhinorrhea and sore throat.    Respiratory: Negative for cough and shortness of breath.    Cardiovascular: Negative for chest pain and palpitations.   Gastrointestinal: Positive for diarrhea. Negative for nausea and vomiting.   Genitourinary: Negative for frequency and urgency.   Musculoskeletal: Positive for gait problem and myalgias. Negative for arthralgias.   Skin: Positive for color change. Negative for wound.   Neurological: Positive for weakness. Negative for dizziness.   Psychiatric/Behavioral: Negative for agitation and behavioral problems.         OBJECTIVE     Vitals:    06/18/21 0900   BP: 135/89   Pulse: 86   Resp:    Temp:    SpO2: 95%       PHYSICAL EXAM  Assessment was completed in conjunction of physical therapy getting patient out of bed to move to chair.   Physical Exam  Vitals and nursing note reviewed.   Constitutional:       General: She is awake.   HENT:       Head: Normocephalic and atraumatic.   Eyes:      General: Lids are normal. Gaze aligned appropriately.   Cardiovascular:      Rate and Rhythm: Normal rate and regular rhythm.   Pulmonary:      Effort: Pulmonary effort is normal. No respiratory distress.   Abdominal:      General: Abdomen is flat.      Palpations: Abdomen is soft.   Musculoskeletal:      Cervical back: Normal range of motion and neck supple.   Feet:      Right foot:      Skin integrity: No ulcer or skin breakdown.      Left foot:      Skin integrity: No ulcer or skin breakdown.   Skin:     General: Skin is warm and dry.      Findings: Erythema present.      Comments: All areas are blanchable.  Slight blanchable erythema present on bony prominences.  Scarring and blanchable erythema of areas of bilateral buttocks and sacrococcygeal region.    Neurological:      Mental Status: She is alert and oriented to person, place, and time.   Psychiatric:         Attention and Perception: Attention normal.         Mood and Affect: Mood normal.         Speech: Speech normal.         Behavior: Behavior is cooperative.           Picture taken by nursing staff:   Darkened areas appear to be scarring from previous pressure injuries.  All areas are blanching now.            Results Review:  Lab Results (last 48 hours)     Procedure Component Value Units Date/Time    POC Glucose Once [[id number]]  (Normal) Collected: 06/18/21 0632    Specimen: Blood Updated: 06/18/21 0643     Glucose 113 mg/dL      Comment: : [id number] Blake DanielMeter ID: [id number]       Manual Differential [[id number]]  (Abnormal) Collected: 06/18/21 0250    Specimen: Blood Updated: 06/18/21 0329     Neutrophil % 90.0 %      Lymphocyte % 4.0 %      Monocyte % 5.0 %      Eosinophil % 1.0 %      Neutrophils Absolute 7.22 10*3/mm3      Lymphocytes Absolute 0.32 10*3/mm3      Monocytes Absolute 0.40 10*3/mm3      Eosinophils Absolute 0.08 10*3/mm3      Hypochromia Slight/1+     Macrocytes  Slight/1+     WBC Morphology Normal     Platelet Estimate Decreased    CBC & Differential [[id number]]  (Abnormal) Collected: 06/18/21 0250    Specimen: Blood Updated: 06/18/21 0329    Narrative:      The following orders were created for panel order CBC & Differential.  Procedure                               Abnormality         Status                     ---------                               -----------         ------                     CBC Auto Differential[[id number]]        Abnormal            Final result                 Please view results for these tests on the individual orders.    CBC Auto Differential [[id number]]  (Abnormal) Collected: 06/18/21 0250    Specimen: Blood Updated: 06/18/21 0329     WBC 8.02 10*3/mm3      RBC 2.91 10*6/mm3      Hemoglobin 8.9 g/dL      Hematocrit 29.4 %      .0 fL      MCH 30.6 pg      MCHC 30.3 g/dL      RDW 17.2 %      RDW-SD 63.9 fl      MPV 9.7 fL      Platelets 126 10*3/mm3      nRBC 0.4 /100 WBC     Basic Metabolic Panel [[id number]]  (Abnormal) Collected: 06/18/21 0250    Specimen: Blood Updated: 06/18/21 0316     Glucose 123 mg/dL      BUN 16 mg/dL      Creatinine 2.40 mg/dL      Sodium 138 mmol/L      Potassium 3.8 mmol/L      Comment: Slight hemolysis detected by analyzer. Results may be affected.        Chloride 98 mmol/L      CO2 29.0 mmol/L      Calcium 9.0 mg/dL      eGFR Non African Amer 20 mL/min/1.73      BUN/Creatinine Ratio 6.7     Anion Gap 11.0 mmol/L     Narrative:      GFR Normal >60  Chronic Kidney Disease <60  Kidney Failure <15      Magnesium [[id number]]  (Normal) Collected: 06/18/21 0250    Specimen: Blood Updated: 06/18/21 0316     Magnesium 1.9 mg/dL     POC Glucose Once [[id number]]  (Normal) Collected: 06/18/21 0122    Specimen: Blood Updated: 06/18/21 0133     Glucose 116 mg/dL      Comment: : [id number] Blake DavidkarlyJarett ID: [id number]       POC Glucose Once [[id number]]  (Normal) Collected: 06/17/21 1626    Specimen: Blood Updated:  06/17/21 1638     Glucose 92 mg/dL      Comment: : [id number] Thalia Caroeter ID: [id number]       Blood Culture - Blood, Arm, Right [[id number]]  (Abnormal) Collected: 06/16/21 1749    Specimen: Blood from Arm, Right Updated: 06/17/21 1503     Blood Culture Abnormal Stain     Gram Stain Aerobic Bottle Gram negative bacilli      Anaerobic Bottle Gram negative bacilli    Blood Culture ID, PCR - Blood, Arm, Right [[id number]]  (Abnormal) Collected: 06/16/21 1740    Specimen: Blood from Arm, Right Updated: 06/17/21 1340     BCID, PCR Pseudomonas aeruginosa. Identification by BCID PCR.     BOTTLE TYPE Anaerobic Bottle    Blood Culture - Blood, Arm, Right [[id number]]  (Abnormal) Collected: 06/16/21 1740    Specimen: Blood from Arm, Right Updated: 06/17/21 1208     Blood Culture Abnormal Stain     Gram Stain Anaerobic Bottle Gram negative bacilli    Urine Culture - Urine, Urine, Catheter [[id number]] Collected: 06/16/21 1730    Specimen: Urine, Catheter Updated: 06/17/21 1142     Urine Culture >100,000 CFU/mL Mixed Jacquie Isolated    Narrative:      Specimen contains mixed organisms of questionable pathogenicity which indicates contamination with commensal jacquie.  Further identification is unlikely to provide clinically useful information.  Suggest recollection.    POC Glucose Once [[id number]]  (Normal) Collected: 06/17/21 1118    Specimen: Blood Updated: 06/17/21 1129     Glucose 96 mg/dL      Comment: : [id number] Thalia Caroeter ID: [id number]       Troponin [[id number]]  (Abnormal) Collected: 06/17/21 1043    Specimen: Blood Updated: 06/17/21 1116     Troponin T 0.103 ng/mL     Narrative:      Troponin T Reference Range:  <= 0.03 ng/mL-   Negative for AMI  >0.03 ng/mL-     Abnormal for myocardial necrosis.  Clinicians would have to utilize clinical acumen, EKG, Troponin and serial changes to determine if it is an Acute Myocardial Infarction or myocardial injury due to an underlying chronic condition.        Results may be falsely decreased if patient taking Biotin.      Lactic Acid, Plasma [[id number]]  (Normal) Collected: 06/17/21 1043    Specimen: Blood Updated: 06/17/21 1108     Lactate 1.6 mmol/L     Manual Differential [[id number]]  (Abnormal) Collected: 06/17/21 0609    Specimen: Blood Updated: 06/17/21 0713     Neutrophil % 75.0 %      Lymphocyte % 3.0 %      Monocyte % 3.0 %      Bands %  12.0 %      Metamyelocyte % 5.0 %      Myelocyte % 2.0 %      Neutrophils Absolute 7.41 10*3/mm3      Lymphocytes Absolute 0.26 10*3/mm3      Monocytes Absolute 0.26 10*3/mm3      nRBC 1.0 /100 WBC      Anisocytosis Slight/1+     Polychromasia Large/3+     WBC Morphology Normal     Platelet Estimate Decreased    CBC Auto Differential [[id number]]  (Abnormal) Collected: 06/17/21 0609    Specimen: Blood Updated: 06/17/21 0713     WBC 8.52 10*3/mm3      RBC 3.22 10*6/mm3      Hemoglobin 9.9 g/dL      Hematocrit 32.3 %      .3 fL      MCH 30.7 pg      MCHC 30.7 g/dL      RDW 17.5 %      RDW-SD 65.0 fl      MPV 9.6 fL      Platelets 130 10*3/mm3     Narrative:      The previously reported component NRBC is no longer being reported. Previous result was 0.2 /100 WBC (Reference Range: 0.0-0.2 /100 WBC) on 6/17/2021 at 0637 [facility].    POC Glucose Once [[id number]]  (Normal) Collected: 06/17/21 0641    Specimen: Blood Updated: 06/17/21 0653     Glucose 96 mg/dL      Comment: : [id number] Blake Otto ID: [id number]       Comprehensive Metabolic Panel [[id number]]  (Abnormal) Collected: 06/17/21 0609    Specimen: Blood Updated: 06/17/21 0651     Glucose 97 mg/dL      BUN 30 mg/dL      Creatinine 4.26 mg/dL      Sodium 135 mmol/L      Potassium 3.8 mmol/L      Chloride 94 mmol/L      CO2 28.0 mmol/L      Calcium 9.2 mg/dL      Total Protein 6.1 g/dL      Albumin 3.40 g/dL      ALT (SGPT) 18 U/L      AST (SGOT) 29 U/L      Alkaline Phosphatase 200 U/L      Total Bilirubin 0.8 mg/dL      eGFR Non African Amer 11 mL/min/1.73       Comment: <15 Indicative of kidney failure.        eGFR   Amer --     Comment: <15 Indicative of kidney failure.        Globulin 2.7 gm/dL      A/G Ratio 1.3 g/dL      BUN/Creatinine Ratio 7.0     Anion Gap 13.0 mmol/L     Narrative:      GFR Normal >60  Chronic Kidney Disease <60  Kidney Failure <15      Phosphorus [[id number]]  (Normal) Collected: 06/17/21 0609    Specimen: Blood Updated: 06/17/21 0651     Phosphorus 3.9 mg/dL     Magnesium [[id number]]  (Normal) Collected: 06/17/21 0609    Specimen: Blood Updated: 06/17/21 0646     Magnesium 1.9 mg/dL     TSH [[id number]]  (Normal) Collected: 06/16/21 1706    Specimen: Blood Updated: 06/17/21 0444     TSH 4.190 uIU/mL     T4, Free [[id number]]  (Normal) Collected: 06/16/21 1706    Specimen: Blood Updated: 06/17/21 0444     Free T4 0.99 ng/dL     Narrative:      Results may be falsely increased if patient taking Biotin.      Gastrointestinal Panel, PCR - Stool, Per Rectum [[id number]]  (Normal) Collected: 06/17/21 0212    Specimen: Stool from Per Rectum Updated: 06/17/21 0342     Campylobacter Not Detected     Plesiomonas shigelloides Not Detected     Salmonella Not Detected     Vibrio Not Detected     Vibrio cholerae Not Detected     Yersinia enterocolitica Not Detected     Enteroaggregative E. coli (EAEC) Not Detected     Enteropathogenic E. coli (EPEC) Not Detected     Enterotoxigenic E. coli (ETEC) lt/st Not Detected     Shiga-like toxin-producing E. coli (STEC) stx1/stx2 Not Detected     Shigella/Enteroinvasive E. coli (EIEC) Not Detected     Cryptosporidium Not Detected     Cyclospora cayetanensis Not Detected     Entamoeba histolytica Not Detected     Giardia lamblia Not Detected     Adenovirus F40/41 Not Detected     Astrovirus Not Detected     Norovirus GI/GII Not Detected     Rotavirus A Not Detected     Sapovirus (I, II, IV or V) Not Detected    Narrative:      If Aeromonas, Staphylococcus aureus or Bacillus cereus are suspected, please order  [id number]: Stool Culture, Aeromonas, S aureus, B Cereus.    Clostridium Difficile Toxin - Stool, Per Rectum [[id number]]  (Abnormal) Collected: 06/17/21 0212    Specimen: Stool from Per Rectum Updated: 06/17/21 0322    Narrative:      The following orders were created for panel order Clostridium Difficile Toxin - Stool, Per Rectum.  Procedure                               Abnormality         Status                     ---------                               -----------         ------                     Clostridium Difficile To...[[id number]]  Abnormal            Final result                 Please view results for these tests on the individual orders.    Clostridium Difficile Toxin, PCR - Stool, Per Rectum [[id number]]  (Abnormal) Collected: 06/17/21 0212    Specimen: Stool from Per Rectum Updated: 06/17/21 0322     C. Difficile Toxins by PCR Positive    Narrative:      Performance characteristics of test not established for patients <2 years of age.    Timed Lactic Acid, Reflex [[id number]]  (Abnormal) Collected: 06/16/21 2050    Specimen: Blood Updated: 06/16/21 2119     Lactate 2.3 mmol/L     COVID-19,[facility],Nasal Swab No Transport Media 3-4 HR TAT - Swab, Nasal Cavity [[id number]]  (Normal) Collected: 06/16/21 1813    Specimen: Swab from Nasal Cavity Updated: 06/16/21 1912     COVID19 Not Detected    Narrative:      Fact sheet for providers: https://www.fda.gov/media/825156/download     Fact sheet for patients: https://www.fda.gov/media/962675/download    Test performed by PCR.    Consider negative results in combination with clinical observations, patient history, and epidemiological information.    Manual Differential [[id number]]  (Abnormal) Collected: 06/16/21 1706    Specimen: Blood Updated: 06/16/21 1839     Neutrophil % 87.0 %      Lymphocyte % 4.0 %      Monocyte % 3.0 %      Bands %  6.0 %      Neutrophils Absolute 6.18 10*3/mm3      Lymphocytes Absolute 0.27 10*3/mm3      Monocytes Absolute 0.20  10*3/mm3      nRBC 2.0 /100 WBC      Anisocytosis Slight/1+     Macrocytes Slight/1+     Polychromasia Slight/1+     WBC Morphology Normal     Platelet Estimate Adequate    CBC & Differential [[id number]]  (Abnormal) Collected: 06/16/21 1706    Specimen: Blood Updated: 06/16/21 1839    Narrative:      The following orders were created for panel order CBC & Differential.  Procedure                               Abnormality         Status                     ---------                               -----------         ------                     CBC Auto Differential[[id number]]        Abnormal            Final result                 Please view results for these tests on the individual orders.    CBC Auto Differential [[id number]]  (Abnormal) Collected: 06/16/21 1706    Specimen: Blood Updated: 06/16/21 1839     WBC 6.65 10*3/mm3      RBC 2.71 10*6/mm3      Hemoglobin 8.5 g/dL      Hematocrit 27.3 %      .7 fL      MCH 31.4 pg      MCHC 31.1 g/dL      RDW 17.7 %      RDW-SD 64.7 fl      MPV 9.5 fL      Platelets 145 10*3/mm3      nRBC 1.1 /100 WBC     Lactic Acid, Plasma [[id number]]  (Abnormal) Collected: 06/16/21 1749    Specimen: Blood Updated: 06/16/21 1818     Lactate 2.8 mmol/L     [place] Draw [[id number]] Collected: 06/16/21 1706    Specimen: Blood Updated: 06/16/21 1817    Narrative:      The following orders were created for panel order [place] Draw.  Procedure                               Abnormality         Status                     ---------                               -----------         ------                     Green Top (Gel)[[id number]]                                  Final result               Lavender Top[[id number]]                                     Final result               Red Top[[id number]]                                          Final result                 Please view results for these tests on the individual orders.    Green Top (Gel) [[id number]] Collected: 06/16/21 1706     Specimen: Blood Updated: 06/16/21 1817     Extra Tube Hold for add-ons.     Comment: Auto resulted.       Lavender Top [[id number]] Collected: 06/16/21 1706    Specimen: Blood Updated: 06/16/21 1817     Extra Tube hold for add-on     Comment: Auto resulted       Red Top [[id number]] Collected: 06/16/21 1706    Specimen: Blood Updated: 06/16/21 1817     Extra Tube Hold for add-ons.     Comment: Auto resulted.       Troponin [[id number]]  (Abnormal) Collected: 06/16/21 1706    Specimen: Blood Updated: 06/16/21 1751     Troponin T 0.136 ng/mL     Narrative:      Troponin T Reference Range:  <= 0.03 ng/mL-   Negative for AMI  >0.03 ng/mL-     Abnormal for myocardial necrosis.  Clinicians would have to utilize clinical acumen, EKG, Troponin and serial changes to determine if it is an Acute Myocardial Infarction or myocardial injury due to an underlying chronic condition.       Results may be falsely decreased if patient taking Biotin.      Protime-INR [[id number]]  (Abnormal) Collected: 06/16/21 1725    Specimen: Blood Updated: 06/16/21 1749     Protime 18.9 Seconds      INR 1.72    aPTT [[id number]]  (Abnormal) Collected: 06/16/21 1725    Specimen: Blood Updated: 06/16/21 1749     PTT 44.0 seconds     Procalcitonin [[id number]]  (Abnormal) Collected: 06/16/21 1706    Specimen: Blood Updated: 06/16/21 1745     Procalcitonin 12.46 ng/mL     Narrative:      As a Marker for Sepsis (Non-Neonates):     1. <0.5 ng/mL represents a low risk of severe sepsis and/or septic shock.  2. >2 ng/mL represents a high risk of severe sepsis and/or septic shock.    As a Marker for Lower Respiratory Tract Infections that require antibiotic therapy:  PCT on Admission     Antibiotic Therapy             6-12 Hrs later  >0.5                          Strongly Recommended            >0.25 - <0.5             Recommended  0.1 - 0.25                  Discouraged                       Remeasure/reassess PCT  <0.1                         Strongly Discouraged  "        Remeasure/reassess PCT      As 28 day mortality risk marker: \"Change in Procalcitonin Result\" (>80% or <=80%) if Day 0 (or Day 1) and Day 4 values are available. Refer to [email or web address]/    Change in PCT <=80 %   A decrease of PCT levels below or equal to 80% defines a positive change in PCT test result representing a higher risk for 28-day all-cause mortality of patients diagnosed with severe sepsis or septic shock.    Change in PCT >80 %   A decrease of PCT levels of more than 80% defines a negative change in PCT result representing a lower risk for 28-day all-cause mortality of patients diagnosed with severe sepsis or septic shock.                Comprehensive Metabolic Panel [[id number]]  (Abnormal) Collected: 06/16/21 1706    Specimen: Blood Updated: 06/16/21 1741     Glucose 113 mg/dL      BUN 25 mg/dL      Creatinine 4.06 mg/dL      Sodium 137 mmol/L      Potassium 3.1 mmol/L      Chloride 95 mmol/L      CO2 28.0 mmol/L      Calcium 9.1 mg/dL      Total Protein 5.9 g/dL      Albumin 3.50 g/dL      ALT (SGPT) 19 U/L      AST (SGOT) 36 U/L      Alkaline Phosphatase 199 U/L      Total Bilirubin 0.6 mg/dL      eGFR Non African Amer 11 mL/min/1.73      Comment: <15 Indicative of kidney failure.        eGFR   Amer --     Comment: <15 Indicative of kidney failure.        Globulin 2.4 gm/dL      A/G Ratio 1.5 g/dL      BUN/Creatinine Ratio 6.2     Anion Gap 14.0 mmol/L     Narrative:      GFR Normal >60  Chronic Kidney Disease <60  Kidney Failure <15      CK [[id number]]  (Normal) Collected: 06/16/21 1706    Specimen: Blood Updated: 06/16/21 1740     Creatine Kinase 37 U/L     Salicylate Level [[id number]]  (Normal) Collected: 06/16/21 1706    Specimen: Blood Updated: 06/16/21 1739     Salicylate <0.3 mg/dL     Acetaminophen Level [[id number]]  (Normal) Collected: 06/16/21 1706    Specimen: Blood Updated: 06/16/21 1739     Acetaminophen <5.0 mcg/mL     Phosphorus [[id number]]  (Normal) " Collected: 06/16/21 1706    Specimen: Blood Updated: 06/16/21 1738     Phosphorus 2.5 mg/dL     Ethanol [[id number]] Collected: 06/16/21 1706    Specimen: Blood Updated: 06/16/21 1736     Ethanol % <0.010 %     Narrative:      Not for legal purposes. Chain of Custody not followed.     Magnesium [[id number]]  (Normal) Collected: 06/16/21 1706    Specimen: Blood Updated: 06/16/21 1735     Magnesium 1.9 mg/dL     Blood Gas, Arterial - [[id number]]  (Abnormal) Collected: 06/16/21 1723    Specimen: Arterial Blood Updated: 06/16/21 1723     Site Right Radial     Rustam's Test Positive     pH, Arterial 7.506 pH units      Comment: 83 Value above reference range        pCO2, Arterial 35.8 mm Hg      pO2, Arterial 54.0 mm Hg      Comment: 85 Value below critical limit        HCO3, Arterial 28.3 mmol/L      Comment: 83 Value above reference range        Base Excess, Arterial 5.0 mmol/L      Comment: 83 Value above reference range        O2 Saturation, Arterial 91.5 %      Comment: 84 Value below reference range        Temperature 37.0 C      Barometric Pressure for Blood Gas 750 mmHg      Modality Room Air     Ventilator Mode NA     Notified Who HOLLIE HANNAH     Notified By [id number]     Notified Time 06/16/2021 17:28     Collected by [id number]     Comment: Meter: [id number]     :  [id number]        pCO2, Temperature Corrected 35.8 mm Hg      pH, Temp Corrected 7.506 pH Units      pO2, Temperature Corrected 54.0 mm Hg         Imaging Results (Last 72 Hours)     Procedure Component Value Units Date/Time    XR Femur 2 View Right [[id number]] Collected: 06/16/21 1933     Updated: 06/16/21 1937    Narrative:      EXAMINATION: XR FEMUR 2 VW RIGHT-     6/16/2021 6:40 PM CDT     HISTORY: rt leg pain, fall     Right femur, 4 views.     No femur fracture is seen.     The hip and knee are normal, to the extent visualized.     No soft tissue abnormality is seen.       Impression:      1. No acute bony abnormality.               This report was finalized on 06/16/2021 19:34 by Dr. Dharmesh Zuleta MD.    XR Pelvis 1 or 2 View [[id number]] Collected: 06/16/21 1931     Updated: 06/16/21 1936    Narrative:      EXAMINATION: XR PELVIS 1 OR 2 VW-     6/16/2021 6:40 PM CDT     HISTORY: fall, pain     Pelvis, one view.     Mildly displaced superior and inferior right pubic ramus fractures are  not acute and are seen on a CT exam from 6/1/2021.     . SI joints are symmetric.     No sacral fracture is seen.     No hip fracture or dislocation.     Summary:  1. Old fractures of the right superior and inferior pubic ramus.  2. No acute fracture is seen.  This report was finalized on 06/16/2021 19:33 by Dr. Dharmesh Zuleta MD.    XR Hand 3+ View Right [[id number]] Collected: 06/16/21 1930     Updated: 06/16/21 1934    Narrative:      EXAMINATION: XR HAND 3+ VW RIGHT-     6/16/2021 6:40 PM CDT     HISTORY: Fall injury. Right hand pain.     Right hand, 3 views.     Osteopenia.     Chronic appearing small soft tissue calcifications are seen adjacent to  the third and fourth MCP joints.     Metacarpals and fingers show no sign of acute fracture.     The distal aspect of the second finger and third finger are obscured by  a Sensor and wire on some of the images.     Carpal bones align normally.     Summary:  1. No acute fracture is seen.     This report was finalized on 06/16/2021 19:31 by Dr. Dharmesh Zuleta MD.    XR Wrist 3+ View Right [[id number]] Collected: 06/16/21 1929     Updated: 06/16/21 1932    Narrative:      EXAMINATION: XR WRIST 3+ VW RIGHT-     6/16/2021 6:40 PM CDT     HISTORY: Fall injury. Right wrist pain.     Right wrist, 3 views.     Osteopenia.     Diffuse arterial calcification.     Intact distal radius and ulna.     Carpal bones align normally.     Proximal metacarpals are intact.     Summary:  1. No acute fracture.     This report was finalized on 06/16/2021 19:29 by Dr. Dharmesh Zuleta MD.    XR Chest 1 View [[id number]] Collected: 06/16/21  1927     Updated: 06/16/21 1932    Narrative:      EXAMINATION: XR CHEST 1 VW-     6/16/2021 6:40 PM CDT     HISTORY: fever, AMS     1 view chest x-ray compared with 6/7/2021.     Stable heart and mediastinum.  Aortic arch calcification.     Interval removal of a right jugular central line.     There is increased interstitial prominence compatible with interstitial  edema and early heart failure.     There is an unchanged appearance of a moderate right pleural effusion.     Nonhealed fracture of the right humeral neck.  This finding was demonstrated on a radiograph from 4/1/2021.     Summary:  1. Increased interstitial prominence compatible with interstitial edema  and early failure.  2. Unchanged or minimally decreased moderate right pleural effusion.  This report was finalized on 06/16/2021 19:28 by Dr. Dharmesh Zuleta MD.    CT Cervical Spine Without Contrast [[id number]] Collected: 06/16/21 1827     Updated: 06/16/21 1832    Narrative:      EXAMINATION: CT CERVICAL SPINE WO CONTRAST-      6/16/2021 6:13 PM CDT     HISTORY: Fall injury. Neck pain. Altered mental status.     In order to have a CT radiation dose as low as reasonably achievable  Automated Exposure Control was utilized for adjustment of the mA and/or  KV according to patient size.     DLP in mGycm= 593.     Noncontrast cervical spine CT.     Comparison is made with cervical spine CT imaging from 3/26/2021.     The patient was unable to cooperate and the images are affected by  motion.     Mild right convex curvature on the coronal sequence is negative and  probably is positional.     The lateral view shows appropriate lordotic curvature with no vertebral  body malalignment.     Prevertebral soft tissues are normal.     Facet joints align normally.     Summary:  1. No acute fracture is seen.                                   This report was finalized on 06/16/2021 18:29 by Dr. Dharmesh Zuleta MD.    CT Head Without Contrast [[id number]] Collected: 06/16/21  1826     Updated: 06/16/21 1830    Narrative:      EXAMINATION: CT HEAD WO CONTRAST-      6/16/2021 6:13 PM CDT     HISTORY: Altered mental status.     In order to have a CT radiation dose as low as reasonably achievable  Automated Exposure Control was utilized for adjustment of the mA and/or  KV according to patient size.     DLP in mGycm= 594.     Noncontrast head CT.     Comparison is made with a noncontrast head CT from 6/1/2021.     Axial, sagittal, and coronal noncontrast CT imaging of the head.     The visualized paranasal sinuses are clear.     The brain and ventricles have an age appropriate appearance.   There is no hemorrhage or mass-effect.   No acute infarction is seen.     No calvarial abnormality.       Impression:      1. No acute intracranial abnormality is seen.  2. Stable chronic change.                                         This report was finalized on 06/16/2021 18:27 by Dr. Dharmesh Zuleta MD.             ASSESSMENT/PLAN       Examination and evaluation of wound(s) was performed.    DIAGNOSIS:   At risk for skin breakdown  History of pressure injuries  Bowel and Bladder Incontinence   Impaired mobility and ADLs  Diabetes mellitus, type 2  C. Difficile diarrhea      PLAN:     Start     Ordered    06/18/21 1154  Elevate Heels Off of Bed  Until Discontinued      06/18/21 1153    06/18/21 1154  Turn Patient  Now Then Every 2 Hours      06/18/21 1153    06/18/21 1154  Use Repositioning Wedge to Position Patient  Continuous      06/18/21 1153    06/18/21 1154  Use Seat Cushion When Up In Chair  Continuous      06/18/21 1153    Unscheduled  Apply Moisture Barrier After Any Incontinence  As Needed     Comments: Hydraguard   Question:  Wound Care Instructions  Answer:  Apply Moisture Barrier After Any Incontinence    06/18/21 1153               Discussed findings and treatment plan including risks, benefits, and treatment options with patient in detail. Patient agreed with treatment plan.      This  document has been electronically signed by ANTHONY Saab on 6/18/2021 10:06 CDT    Electronically signed by Krissy Carballo APRN at 06/21/21 0637     Fabio Glover MD at 06/18/21 1716      Consult Orders    1. Inpatient Infectious Diseases Consult [[id number]] ordered by Bin Guy DO at 06/18/21 0940               INFECTIOUS DISEASES CONSULT NOTE    Patient:  Mini Gentile 61 y.o. female  ROOM # 371/1  YOB: 1960  MRN: [id number]  CSN:  [id number]  Admit date: 6/16/2021   Admitting Physician: Bin Guy DO  Primary Care Physician: Bharati Dahl APRN  REFERRING PROVIDER: Tejinder Kent MD    Reason for Consultation: C. difficile, Pseudomonas bacteremia, multiple allergies    History of Present Illness/Chief Complaint: Pleasant 61-year-old woman.  Little bit difficult to get clear history from the patient.  Asked what brought her to the hospital.  She indicates she was at a nursing home.  She indicates she had fallen.  She really was not sure that she had.  My review the admit H&P she had been found lying face down in her bed.  She was confused.  She was brought to the hospital.  She had been experiencing some diarrhea.  Per review of the admit H&P she was uncomfortable and moaning in bed.  She underwent extensive evaluation in the emergency room.  Multiple images did not show anything acute.  She had had some hypotension.  She received antibiotic treatment a few weeks ago for pneumonia.  Additional testing revealed she was stool C. difficile positive.  She also is now growing gram-negative anson of the blood.  She has been improving with her initial therapy with oral vancomycin and intravenous cefepime.  Infectious disease asked to evaluate and offer recommendations given the combination of C. difficile and bacteremia.    Current Scheduled Medications:   aspirin, 81 mg, Oral, Daily  atropine, 1 drop, Left Eye, Daily  cefepime, 500 mg, Intravenous,  "Q24H  cholecalciferol, 2,000 Units, Oral, Daily  epoetin vika-epbx, 10,000 Units, Subcutaneous, Once per day on  Sat  famotidine, 20 mg, Oral, Once per day on  Sat  Ferric Citrate, 4 tablet, Oral, TID With Meals  fluticasone, 2 spray, Nasal, BID  insulin lispro, 2-7 Units, Subcutaneous, TID AC  lactobacillus acidophilus, 1 capsule, Oral, Daily  pravastatin, 10 mg, Oral, Nightly  sertraline, 25 mg, Oral, Daily  sodium chloride, 10 mL, Intravenous, Q12H  timolol, 1 drop, Left Eye, Daily  vancomycin, 125 mg, Oral, Q6H      Current PRN Medications:  •  acetaminophen **OR** acetaminophen  •  brimonidine  •  dextrose  •  dextrose  •  glucagon (human recombinant)  •  ipratropium-albuterol  •  ondansetron **OR** ondansetron  •  sodium chloride  •  [COMPLETED] Insert peripheral IV **AND** sodium chloride  •  sodium chloride    Allergies:    Allergies   Allergen Reactions   • Bupropion Nausea Only   • Chantix [Varenicline] Other (See Comments)     Does not remember   • Gabapentin Hallucinations     hallucinations   • Azithromycin Rash   • Levofloxacin Rash   • Penicillins Rash   • Sulfa Antibiotics Rash     Past Medical History: Diabetes mellitus.  End-stage renal disease.  She undergoes hemodialysis.  Hypertension.  History of mucoid otitis media.  She has some compliance issues with dialysis.  Tobacco use.  Hyperlipidemia.    Past Surgical History: Left arm fistula.   section.  Cholecystectomy.  Myringotomy tube placement.  Tubal ligation.    Social History: She smokes tobacco.  No alcohol use.  No illicit drug use.    Family History: Diabetes mellitus.  Heart disease.    Exposure History: No close contacts have been ill.    Review of Systems see HPI    Vital Signs:  /62 (BP Location: Right leg, Patient Position: Lying)   Pulse 76   Temp 98.5 °F (36.9 °C)   Resp 16   Ht 147.3 cm (58\")   Wt 67.8 kg (149 lb 7.6 oz)   SpO2 94%   BMI 31.24 kg/m²  Temp (24hrs), Av.7 °F (37.1 °C), Min:98.3 " °F (36.8 °C), Max:99.1 °F (37.3 °C)    Physical Exam  Vital signs - reviewed.  Line/IV site - No erythema or tenderness.  Lungs are clear without crackles or wheezes  Heart distant heart sounds without apparent murmur  Abdomen with normal bowel sounds.  No significant distention.  Minimal tenderness.  No rebound.  Extremities trace edema  Neurological examination did not appear to show any focal deficits or lateralizing findings.  She was alert able to answer questions.  At times seem to have a little bit of confusion as to sequence of events, was following commands and answering questions appropriately.    Lab Results:  CBC:   Results from last 7 days   Lab Units 06/19/21  0550 06/18/21  0250 06/17/21  0609 06/16/21  1706 06/16/21  1245   WBC 10*3/mm3 7.47 8.02 8.52 6.65 5.45   HEMOGLOBIN g/dL 9.2* 8.9* 9.9* 8.5* 8.9*   HEMATOCRIT % 30.1* 29.4* 32.3* 27.3* 28.0*   PLATELETS 10*3/mm3 121* 126* 130* 145 156   LYMPHOCYTE % % 5.0* 4.0* 3.0* 4.0* 3.0*   MONOCYTES % % 6.0 5.0 3.0* 3.0* 4.0*     CMP:   Results from last 7 days   Lab Units 06/19/21  0550 06/18/21  0250 06/17/21  0609 06/16/21  1706 06/16/21  1245   SODIUM mmol/L 135* 138 135* 137 137   POTASSIUM mmol/L 3.5 3.8 3.8 3.1* 3.1*   CHLORIDE mmol/L 95* 98 94* 95* 94*   CO2 mmol/L 27.0 29.0 28.0 28.0 28.0   BUN mg/dL 30* 16 30* 25* 25*   CREATININE mg/dL 3.21* 2.40* 4.26* 4.06* 3.68*   CALCIUM mg/dL 9.5 9.0 9.2 9.1 9.0   BILIRUBIN mg/dL  --   --  0.8 0.6 0.6   ALK PHOS U/L  --   --  200* 199* 222*   ALT (SGPT) U/L  --   --  18 19 20   AST (SGOT) U/L  --   --  29 36* 36*   GLUCOSE mg/dL 124* 123* 97 113* 150*     Blood cultures June 16, 2021:  Susceptibility     Pseudomonas aeruginosa     VICKEY (Preliminary)     Cefepime 32 ug/ml Resistant     Ceftazidime >=64 ug/ml Resistant     Ciprofloxacin <=0.25 ug/ml Susceptible     Levofloxacin 0.5 ug/ml Susceptible      Radiology:  Chest x-ray June 16, 2021:  Summary:  1. Increased interstitial prominence compatible with  interstitial edema  and early failure.  2. Unchanged or minimally decreased moderate right pleural effusion.  This report was finalized on 06/16/2021 19:28 by Dr. Dharmesh Zuleta MD.     CT abdomen and pelvis on June 1, 2021:  IMPRESSION:  1. Anasarca. Otherwise, no acute process involving the abdomen or  pelvis.  This report was finalized on 06/01/2021 19:54 by Dr Jhonatan Berry,     Additional Studies Reviewed:     Impression:   1.  Pseudomonas bacteremia-suspect this was transient via gastrointestinal source.  2.  C. difficile colitis  3.  End-stage renal disease on hemodialysis  4.  Diabetes mellitus  5.  Allergies listed to azithromycin, levofloxacin, penicillins, and sulfa antibiotics-nature of reaction uncertain    Recommendations:    Continue treatment with oral vancomycin  Discontinue cefepime  Begin treatment with meropenem  Will plan 5-day course of meropenem treatment (asking micro to report meropenem susceptibility)-if not susceptible we will asked him to report Avycaz  Suspect bacteremia was transient and do not feel extended course of treatment will be necessary and feel a more truncated antibiotic course would be preferable given her C. difficile  Will continue to follow    Fabio Gomez MD  06/19/21  08:24 CDT            Electronically signed by Fabio Gomez MD at 06/19/21 0836         6/17  B/p 120/103    76/48   75/65   81/43     Pt drowsy and oriented to only person. Frequent complaints of pain on right side of body, particularly right arm. Complaint of back pain intermittently. Blood pressures adequate, around 100-110 systolic. Room air with good sat. Positive for C - diff with multiple bowel movements.     Patient had hemo dialysis completed today with 2200ml removed. Speech [facility] with thin liquids. Has remained confused throughout the day, but more awakening easier   6/16  B/p 89/47     Temp 100.2       From [place]   6/18   Decreasing lethargy. Room air  with good sat. NSR 70-90. BPs 100-110 systolic   6/19Neurochecks Q4, pt alert and oriented

## 2021-06-21 NOTE — THERAPY TREATMENT NOTE
Acute Care - Physical Therapy Treatment Note  [facility]     Patient Name: Mini Gentile  : 1960  MRN: [id number]  Today's Date: 2021           PT Assessment (last 12 hours)      PT Evaluation and Treatment     Row Name 21 1358 21 0904       Physical Therapy Time and Intention    Subjective Information  complains of;weakness;fatigue  -NAOMI  complains of;weakness;fatigue  -NAOMI    Document Type  therapy note (daily note)  -NAOMI  therapy note (daily note)  -NAOMI    Mode of Treatment  physical therapy  -NAOMI  physical therapy  -NAOMI    Row Name 21 1358 21 0904       General Information    Existing Precautions/Restrictions  fall;non-weight bearing NWB RUE due to unclear order   -NAOMI  fall;non-weight bearing NWB RUE due to unclear order   -NAOMI    Limitations/Impairments  visual  -NAOMI  --    Row Name 21 1358 21 0904       Pain Scale: Word Pre/Post-Treatment    Pain: Word Scale, Pretreatment  2 - mild pain  -NAOMI  2 - mild pain  -NAOMI    Posttreatment Pain Rating  4 - moderate pain  -NAOMI  2 - mild pain  -NAOMI    Pain Location - Side  Right  -NAOMI  Right  -NAOMI    Pain Location - Orientation  --  upper  -NAOMI    Pain Location  shoulder  -NAOMI  shoulder  -NAOMI    Row Name 21 1358 21 0904       Bed Mobility    Bed Mobility  --  sit-supine  -NAOMI    Supine-Sit [place] (Bed Mobility)  verbal cues;contact guard  -NAOMI  verbal cues;minimum assist (75% patient effort)  -NAOMI    Sit-Supine [place] (Bed Mobility)  contact guard  -NAOMI  verbal cues;contact guard  -NAOMI    Row Name 21 1358 21 0904       Transfers    Transfers  --  toilet transfer  -NAOMI    Sit-Stand [place] (Transfers)  verbal cues;minimum assist (75% patient effort)  -NAOMI  verbal cues;minimum assist (75% patient effort)  -NAOMI    [place] Level (Toilet Transfer)  verbal cues;contact guard  -NAOMI  verbal cues;contact guard  -NAOMI    Assistive Device (Toilet Transfer)  commode;grab bars/safety frame  -NAOMI  commode;grab  bars/safety frame  -NAOMI    Row Name 06/21/21 0904          Toilet Transfer    Type (Toilet Transfer)  sit-stand;stand-sit  -NAOMI     Row Name 06/21/21 1358 06/21/21 0904       Gait/Stairs (Locomotion)    [place] Level (Gait)  verbal cues;minimum assist (75% patient effort)  -NAOMI  verbal cues;contact guard;minimum assist (75% patient effort)  -NAOMI    Assistive Device (Gait)  --  -- HHA   -NAOMI    Distance in Feet (Gait)  60  -NAOMI  40', had to stop due to needing to use the bathroom   -NAOMI    Deviations/Abnormal Patterns (Gait)  gait speed decreased;stride length decreased  -NAOMI  --    Bilateral Gait Deviations  forward flexed posture  -NAOMI  --    Comment (Gait/Stairs)  tried to get pt to amb without HHA, but pt needed either HHA or to hold the IV pole.  May benefit from a straight cane   -NAOMI  pt complained of increase fatigue today and appeared more lethargic   -NAOMI    Row Name 06/21/21 0904          Aerobic Exercise    Comment, Aerobic Exercise (Therapeutic Exercise)  sitting EOB, AROM BLE X 20 cues to perform slowly  -NAOMI     Row Name             Wound 04/04/21 0142 coccyx    Wound - Properties Group Placement Date: 04/04/21  -VT Placement Time: 0142 -VT Location: coccyx  -VT    Retired Wound - Properties Group Date first assessed: 04/04/21  -VT Time first assessed: 0142 -VT Location: coccyx  -VT    Row Name 06/21/21 1358 06/21/21 0904       Positioning and Restraints    Pre-Treatment Position  in bed  -NAOMI  in bed  -NAOMI    Post Treatment Position  bed  -NAOMI  bed  -NAOMI    In Bed  fowlers;call light within reach;encouraged to call for assist;side rails up x2  -NAOMI  fowlers;call light within reach;encouraged to call for assist;side rails up x2;exit alarm on  -NAOMI      User Key  (r) = Recorded By, (t) = Taken By, (c) = Cosigned By    Initials Name Provider Type    Domingo Whitaker PTA Physical Therapy Assistant    Alyx Kumar RN Registered Nurse        Physical Therapy Education                 Title: PT OT SLP  Therapies (In Progress)     Topic: Physical Therapy (In Progress)     Point: Mobility training (Done)     Learning Progress Summary           Patient Acceptance, E, VU by NAOMI at 6/21/2021 0904    Comment: progression with amb    Acceptance, E, VU by NAOMI at 6/20/2021 0841    Comment: safety with mobility and amb    Acceptance, E, VU,NR by ARIES at 6/18/2021 1023    Comment: progression of PT POC, benefits of activity                   Point: Home exercise program (Not Started)     Learner Progress:  Not documented in this visit.          Point: Body mechanics (Not Started)     Learner Progress:  Not documented in this visit.          Point: Precautions (Not Started)     Learner Progress:  Not documented in this visit.                      User Key     Initials Effective Dates Name Provider Type Discipline    ARIES 08/02/16 -  Natanael Pardo, PT DPT Physical Therapist PT    NAOMI 12/08/16 -  Domingo Dill PTA Physical Therapy Assistant PT              PT Recommendation and Plan     Plan of Care Reviewed With: patient  Progress: improving  Outcome Summary: Pt required min assist for supine to sit.  Transfered sit to stand with CGA/min assist.  Amb 40' with CGA/min assist and HHA.  Decreased distance due to needing to use the bathroom.  Will continue to work with pt to increase strength and progress amb.  Outcome Measures     Row Name 06/21/21 1000 06/20/21 1357          How much help from another is currently needed...    Putting on and taking off regular lower body clothing?  2  -CJ  2  -CJ     Bathing (including washing, rinsing, and drying)  2  -CJ  2  -CJ     Toileting (which includes using toilet bed pan or urinal)  3  -CJ  3  -CJ     Putting on and taking off regular upper body clothing  3  -CJ  3  -CJ     Taking care of personal grooming (such as brushing teeth)  4  -CJ  4  -CJ     Eating meals  4  -CJ  4  -CJ     AM-PAC 6 Clicks Score (OT)  18  -CJ  18  -CJ        Functional Assessment    Outcome Measure Options   AM-PAC 6 Clicks Daily Activity (OT)  -  AM-PAC 6 Clicks Daily Activity (OT)  -       User Key  (r) = Recorded By, (t) = Taken By, (c) = Cosigned By    Initials Name Provider Type    Jacky Hawkins COTA/L Occupational Therapy Assistant           Time Calculation:   PT Charges     Row Name 06/21/21 1358 06/21/21 0904          Time Calculation    Start Time  1358  -NAOMI  0904  -NAOMI     Stop Time  1421  -NAOMI  0930  -NAOMI     Time Calculation (min)  23 min  -NAOMI  26 min  -NAOMI     PT Received On  06/21/21  -NAOMI  06/21/21  -NAOMI        Time Calculation- PT    Total Timed Code Minutes- PT  23 minute(s)  -NAOMI  26 minute(s)  -NAOMI        Timed Charges    62948 - Gait Training Minutes   23  -NAOMI  10  -NAOMI     36149 - PT Therapeutic Activity Minutes  --  16  -NAOMI        Total Minutes    Timed Charges Total Minutes  23  -NAOMI  26  -NAOMI      Total Minutes  23  -NAOMI  26  -NAOMI       User Key  (r) = Recorded By, (t) = Taken By, (c) = Cosigned By    Initials Name Provider Type    Domingo Whitaker PTA Physical Therapy Assistant        Therapy Charges for Today     Code Description Service Date Service Provider Modifiers Qty    69099347085 HC GAIT TRAINING EA 15 MIN 6/20/2021 Domingo Dill, PTA GP 1    67598962356 HC PT THER PROC EA 15 MIN 6/20/2021 Domingo Dill, PTA GP 1    55780319279 HC PT THERAPEUTIC ACT EA 15 MIN 6/21/2021 Domingo Dill, PTA GP 1    35272517293 HC GAIT TRAINING EA 15 MIN 6/21/2021 Domingo Dill, PTA GP 1    75459432926 HC GAIT TRAINING EA 15 MIN 6/21/2021 Domingo Dill, PTA GP 2          PT G-Codes  Outcome Measure Options: AM-PAC 6 Clicks Daily Activity (OT)  AM-PAC 6 Clicks Score (PT): 17  AM-PAC 6 Clicks Score (OT): 18    Domingo Dill PTA  6/21/2021

## 2021-06-21 NOTE — CASE MANAGEMENT/SOCIAL WORK
Continued Stay Note   Db     Patient Name: Mini Gentile  MRN: [id number]  Today's Date: 6/21/2021    Admit Date: 6/16/2021    Discharge Plan     Row Name 06/21/21 0836       Plan    Plan  [facility]- if bed available (mentioned several more days here from MD yesterday)    Patient/Family in Agreement with Plan  yes    Plan Comments  Pt does not have a bed hold at [facility] but they will take pt back if female bed available. Pt will also need ins. precert before return, please inform when closer to d/c time. Will follow. [phone]        Discharge Codes    No documentation.             SACHI Douglas

## 2021-06-21 NOTE — PROGRESS NOTES
Nephrology ([facility]) Progress Note      Patient:  Mini Gentile  YOB: 1960  Date of Service: 6/21/2021  MRN: [id number]   Acct: [id number]   Primary Care Physician: Bharati Dahl APRN  Advance Directive:   Code Status and Medical Interventions:   Ordered at: 06/17/21 0220     Code Status:    CPR     Medical Interventions (Level of Support Prior to Arrest):    Full     Admit Date: 6/16/2021       Hospital Day: 5  Referring Provider: Tejinder Kent MD      Patient personally seen and examined.  Complete chart including Consults, Notes, Operative Reports, Labs, Cardiology, and Radiology studies reviewed as able.    Chief complaint: Abnormal labs.    Subjective:  Mini Gentile is a 61 y.o. female  whom we were consulted for end stage renal disease. Maintenance hemodialysis on Tuesday Thursday Saturday at [facility]. Fairly noncompliant with dialysis treatments. She was just discharged from [facility] on 6/8. Unknown if she has been going to HD treatments since then.  Presented to ER after she was noted to be confused at nursing home.       This afternoon she feels very well.  She denies any diarrhea.  However she has frequent soft bowel movements.       Allergies:  Bupropion, Chantix [varenicline], Gabapentin, Azithromycin, Levofloxacin, Penicillins, and Sulfa antibiotics    Home Meds:  Medications Prior to Admission   Medication Sig Dispense Refill Last Dose   • aspirin 81 MG EC tablet Take 81 mg by mouth Daily.   6/16/2021 at Unknown time   • atropine 1 % ophthalmic solution Administer 1 drop into the left eye Daily.   6/16/2021 at Unknown time   • brimonidine (ALPHAGAN) 0.2 % ophthalmic solution Administer 1 drop into the left eye 2 (two) times a day.   6/16/2021 at Unknown time   • carvedilol (COREG) 6.25 MG tablet Take 6.25 mg by mouth 2 (Two) Times a Day With Meals.   6/16/2021 at Unknown time   • cholecalciferol (VITAMIN D3) 1000 units tablet  Take 2,000 Units by mouth Daily.   6/16/2021 at Unknown time   • dorzolamide (TRUSOPT) 2 % ophthalmic solution Administer 1 drop into the left eye 2 (Two) Times a Day.   6/16/2021 at Unknown time   • epoetin vika-epbx (RETACRIT) 93117 UNIT/ML injection Inject 1 mL under the skin into the appropriate area as directed 3 (Three) Times a Week. Indications: ESRD on Dialysis 6.6 mL  6/16/2021 at Unknown time   • famotidine (PEPCID) 20 MG tablet Take 1 tablet by mouth Daily.   6/16/2021 at Unknown time   • Ferric Citrate 1  MG(Fe) tablet Take 1 tablet by mouth 3 (Three) Times a Day With Meals.   6/16/2021 at Unknown time   • fluticasone (FLONASE) 50 MCG/ACT nasal spray 2 sprays into the nostril(s) as directed by provider 2 (Two) Times a Day.   6/16/2021 at Unknown time   • insulin lispro (humaLOG) 100 UNIT/ML injection Inject under the skin TID AC as per sliding scale:  200-249= 2 units  250-299= 4 units  300-349= 6 units  350-399= 8 units  400-500= 10 units and call MD   6/16/2021 at Unknown time   • metoclopramide (REGLAN) 5 MG tablet Take 1 tablet by mouth 4 (Four) Times a Day Before Meals & at Bedtime.   6/16/2021 at Unknown time   • rosuvastatin (CRESTOR) 5 MG tablet Take 5 mg by mouth every night at bedtime.   6/15/2021 at Unknown time   • saccharomyces boulardii (FLORASTOR) 250 MG capsule Take 250 mg by mouth Daily.   6/16/2021 at Unknown time   • sertraline (ZOLOFT) 25 MG tablet Take 1 tablet by mouth Daily.   6/16/2021 at Unknown time   • timolol (TIMOPTIC) 0.5 % ophthalmic solution Administer 1 drop into the left eye Every Morning.  12 6/16/2021 at Unknown time   • acetaminophen (TYLENOL) 325 MG tablet Take 650 mg by mouth Every 4 (Four) Hours As Needed for Mild Pain  or Fever.   Unknown at Unknown time   • albuterol sulfate  (90 Base) MCG/ACT inhaler Inhale 2 puffs Every 4 (Four) Hours As Needed for Wheezing. 18 g 0 Unknown at Unknown time   • ipratropium-albuterol (DUO-NEB) 0.5-2.5 mg/3 ml  nebulizer Take 3 mL by nebulization Every 4 (Four) Hours As Needed for Wheezing or Shortness of Air.   Unknown at Unknown time   • ondansetron (ZOFRAN) 4 MG tablet Take 1 tablet by mouth Every 6 (Six) Hours As Needed for Nausea or Vomiting. 24 tablet 2 Unknown at Unknown time       Medicines:  Current Facility-Administered Medications   Medication Dose Route Frequency Provider Last Rate Last Admin   • acetaminophen (TYLENOL) tablet 650 mg  650 mg Oral Q4H PRN Bin Guy DO   650 mg at 06/21/21 0811    Or   • acetaminophen (TYLENOL) suppository 650 mg  650 mg Rectal Q4H PRN Bin Guy DO       • aspirin EC tablet 81 mg  81 mg Oral Daily Bin Guy DO   81 mg at 06/21/21 0811   • atropine 1 % ophthalmic solution 1 drop  1 drop Left Eye Daily Bin Guy DO   1 drop at 06/21/21 1147   • brimonidine (ALPHAGAN) 0.2 % ophthalmic solution 1 drop  1 drop Left Eye BID PRN Bin Guy DO   1 drop at 06/19/21 0827   • carvedilol (COREG) tablet 6.25 mg  6.25 mg Oral BID With Meals Bin Guy DO   6.25 mg at 06/21/21 0811   • cholecalciferol (VITAMIN D3) tablet 2,000 Units  2,000 Units Oral Daily Bin Guy DO   2,000 Units at 06/21/21 0811   • dextrose (D50W) 25 g/ 50mL Intravenous Solution 25 g  25 g Intravenous Q15 Min PRN Bin Guy DO       • dextrose (GLUTOSE) oral gel 15 g  15 g Oral Q15 Min PRN Bin Guy DO       • epoetin vika-epbx (RETACRIT) injection 10,000 Units  10,000 Units Subcutaneous Once per day on Tue Thu Sat Bin Guy DO   10,000 Units at 06/19/21 0823   • famotidine (PEPCID) tablet 20 mg  20 mg Oral Once per day on Tue Thu Sat Bin Guy DO   20 mg at 06/19/21 1729   • Ferric Citrate tablet 840 mg  4 tablet Oral TID With Meals Bin Guy DO   840 mg at 06/21/21 1146   • fluticasone (FLONASE) 50 MCG/ACT nasal spray 2 spray  2 spray Nasal BID Bin Guy,    2 spray at 06/21/21 1147   • glucagon (human recombinant)  (GLUCAGEN DIAGNOSTIC) injection 1 mg  1 mg Subcutaneous Q15 Min PRN Bin Guy DO       • insulin lispro (humaLOG) injection 2-7 Units  2-7 Units Subcutaneous TID AC Bin Guy DO   2 Units at 06/20/21 1227   • ipratropium-albuterol (DUO-NEB) nebulizer solution 3 mL  3 mL Nebulization Q4H PRN Bin Guy DO       • lactobacillus acidophilus (RISAQUAD) capsule 1 capsule  1 capsule Oral Daily Bin Guy DO   1 capsule at 06/21/21 0811   • meropenem (MERREM) 1 g/100 mL 0.9% NS VTB (mbp)  1 g Intravenous Once Fabio Glover MD       • meropenem (MERREM) 500mg/100 mL 0.9% NS IVPB (mbp)  500 mg Intravenous Q24H Fabio Glover MD   500 mg at 06/20/21 1228   • ondansetron (ZOFRAN) tablet 4 mg  4 mg Oral Q6H PRN Bin Guy DO        Or   • ondansetron (ZOFRAN) injection 4 mg  4 mg Intravenous Q6H PRN Bin Guy DO       • pravastatin (PRAVACHOL) tablet 10 mg  10 mg Oral Nightly Bin Guy DO   10 mg at 06/20/21 2028   • sertraline (ZOLOFT) tablet 25 mg  25 mg Oral Daily Bin Guy DO   25 mg at 06/21/21 0811   • sodium chloride 0.9 % bolus 100 mL  100 mL Intravenous PRN Bin Guy DO       • sodium chloride 0.9 % bolus 100 mL  100 mL Intravenous PRN Adi Gonzalez MD       • sodium chloride 0.9 % flush 10 mL  10 mL Intravenous PRN Bin Guy DO       • sodium chloride 0.9 % flush 10 mL  10 mL Intravenous Q12H Bin Guy DO   10 mL at 06/21/21 0812   • sodium chloride 0.9 % flush 10 mL  10 mL Intravenous PRN Bin Guy DO       • timolol (TIMOPTIC) 0.5 % ophthalmic solution 1 drop  1 drop Left Eye Daily Bin Guy DO   1 drop at 06/21/21 0814   • vancomycin oral solution reconstituted 125 mg  125 mg Oral Q6H Bin Guy, DO   125 mg at 06/21/21 1146       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    • Chronic otitis media    • Diabetes (CMS/HCC)     • End stage renal disease on dialysis (CMS/[facility])    • Eustachian tube dysfunction    • GERD (gastroesophageal reflux disease)    • Glaucoma    • HTN (hypertension)    • Hyperlipidemia    • Mucoid otitis media    • Noncompliance of patient with renal dialysis (CMS/[facility])    • Tobacco abuse        Past Surgical History:  Past Surgical History:   Procedure Laterality Date   • ARTERIOVENOUS FISTULA     • CATARACT EXTRACTION WITH INTRAOCULAR LENS IMPLANT     •  SECTION     • CHOLECYSTECTOMY     • MYRINGOTOMY W/ TUBES Bilateral    • MYRINGOTOMY W/ TUBES Right    • TUBAL ABDOMINAL LIGATION         Family History  Family History   Problem Relation Age of Onset   • Heart disease Mother    • Diabetes Father    • Colon cancer Neg Hx    • Colon polyps Neg Hx        Social History  Social History     Socioeconomic History   • Marital status: Single     Spouse name: Not on file   • Number of children: Not on file   • Years of education: Not on file   • Highest education level: Not on file   Tobacco Use   • Smoking status: Current Every Day Smoker     Packs/day: 2.00     Years: 6.00     Pack years: 12.00     Types: Cigarettes   • Smokeless tobacco: Never Used   Vaping Use   • Vaping Use: Never used   Substance and Sexual Activity   • Alcohol use: No   • Drug use: No   • Sexual activity: Defer         Review of Systems:  History obtained from chart review and the patient  General ROS: No fever or chills  Respiratory ROS: No cough, shortness of breath, wheezing  Cardiovascular ROS: No chest pain or palpitations  Gastrointestinal ROS: No abdominal pain or melena  Genito-Urinary ROS: No dysuria or hematuria    Objective:  Patient Vitals for the past 24 hrs:   BP Temp Temp src Pulse Resp SpO2 Weight   21 1100 131/57 97.6 °F (36.4 °C) Oral 76 16 96 % --   21 0744 176/86 98.3 °F (36.8 °C) Oral 80 16 -- --   21 0459 148/64 98.2 °F (36.8 °C) Oral 75 16 95 % 61.9 kg (136 lb 6.4 oz)   21 2231 141/67 97.6 °F  (36.4 °C) Oral 74 16 94 % --   06/20/21 1844 153/60 97.8 °F (36.6 °C) Oral 71 18 98 % --       Intake/Output Summary (Last 24 hours) at 6/21/2021 1226  Last data filed at 6/21/2021 0936  Gross per 24 hour   Intake 240 ml   Output --   Net 240 ml     General: awake/alert   HEENT: Normocephalic atraumatic head.  Neck: Supple with no JVD or carotid bruits.  Chest:  clear to auscultation bilaterally without respiratory distress  CVS: regular rate and rhythm  Abdominal: soft, nontender, positive bowel sounds  Extremities: no cyanosis or edema  Skin: warm and dry without rash      Labs:  Results from last 7 days   Lab Units 06/19/21  0550 06/18/21  0250 06/17/21  0609   WBC 10*3/mm3 7.47 8.02 8.52   HEMOGLOBIN g/dL 9.2* 8.9* 9.9*   HEMATOCRIT % 30.1* 29.4* 32.3*   PLATELETS 10*3/mm3 121* 126* 130*         Results from last 7 days   Lab Units 06/21/21  0551 06/20/21  0625 06/19/21  0550 06/17/21  0609 06/16/21  1706 06/16/21  1245   SODIUM mmol/L 133* 132* 135* 135* 137 137   POTASSIUM mmol/L 4.0 4.2 3.5 3.8 3.1* 3.1*   CHLORIDE mmol/L 94* 94* 95* 94* 95* 94*   CO2 mmol/L 28.0 25.0 27.0 28.0 28.0 28.0   BUN mg/dL 22 16 30* 30* 25* 25*   CREATININE mg/dL 3.59* 2.43* 3.21* 4.26* 4.06* 3.68*   CALCIUM mg/dL 9.1 9.2 9.5 9.2 9.1 9.0   BILIRUBIN mg/dL  --   --   --  0.8 0.6 0.6   ALK PHOS U/L  --   --   --  200* 199* 222*   ALT (SGPT) U/L  --   --   --  18 19 20   AST (SGOT) U/L  --   --   --  29 36* 36*   GLUCOSE mg/dL 165* 181* 124* 97 113* 150*       Radiology:   Imaging Results (Last 72 Hours)     ** No results found for the last 72 hours. **          Culture:  Blood Culture   Date Value Ref Range Status   06/19/2021 No growth at less than 24 hours  Preliminary   06/16/2021 Pseudomonas aeruginosa (C)  Preliminary     Comment:     Refer to previous blood culture collected 6/16/2021 at 1840 for VICKEY's.     06/16/2021 Pseudomonas aeruginosa (C)  Preliminary     Urine Culture   Date Value Ref Range Status   06/16/2021 >100,000  CFU/mL Mixed Janeth Isolated  Final         Assessment   1.  End-stage renal disease on maintenance dialysis.  2.  Type II diabetic nephropathy.  3.  C. difficile colitis.  4.  Chronic obstructive pulmonary disease/active tobacco use.  5.  Anemia of chronic kidney disease.  6.  Noncompliant.  7.  Metabolic encephalopathy improved.       Plan:  1.  Hemodialysis treatment tomorrow.  2.  P.o. vancomycin.  3.  Resume ELVIS.        Jose Caballero MD  6/21/2021  12:26 CDT

## 2021-06-21 NOTE — PLAN OF CARE
Goal Outcome Evaluation:  Plan of Care Reviewed With: patient        Progress: improving  Outcome Summary: Pt is on a soft texture, ground meat diet per ST recommendations. Very little po intake recorded. She averaged 38% intake of 2 meals. Pt does have a wound to her coccyx. She does require routine OP HD. Per MD notes, she is often non-compliant with going to HD. Will follow and encourage intake.

## 2021-06-21 NOTE — PROGRESS NOTES
1           [facility] Medicine Services  INPATIENT PROGRESS NOTE    Patient Name: Mini Gentile  Date of Admission: 6/16/2021  Today's Date: 06/21/21  Length of Stay: 5  Primary Care Physician: Bharati Dahl APRN    Subjective   Chief Complaint: Follow-up  HPI     She is known to me from a prior hospitalization where she presented with altered mental status.  She is an end-stage renal disease patient on dialysis every Tuesday Thursday and Saturday.  She carries history of diabetes mellitus, hypertension.  In her last hospitalization she stayed from June 1 through June 8.  She is was transferred to a facility.    She was readmitted on June 16 with altered mental status..  Patient reportedly found laying face down in her bed with confusion.  Pertinent findings include a procalcitonin of 12.5, potassium of 3.1, mildly elevated lactic acid, macrocytic anemia, flat trend of troponin, hypoxia per ABG.  Her head CT scan showed no acute intracranial abnormality and stable chronic changes.    She was admitted with altered mental status, hypotension, diarrhea, diabetes mellitus, chronic anemia.    She was seen in consultation by wound care nurse, Gomez Lee with nephrology.  Patient been noted to have C. difficile and Pseudomonas bacteremia    Blood culture as of June 19 (compared to June 16) is clear of Pseudomonas bacteremia.  She is currently on Merrem.  She was just started on Merrem on June 20 at 1228-hour.  She initially received cefepime on June 16 and June 18 she is also on oral vancomycin (started on June 17) for C. difficile infection.    States she is feeling better.  She mentioned her last bowel movement was yesterday.  It was watery.  She has not had any bowel movement for need to go to the bathroom today  She denies any abdominal pain      Review of Systems     All pertinent negatives and positives are as above. All other systems have been reviewed and are negative  unless otherwise stated.     Objective    Temp:  [97.6 °F (36.4 °C)-98.3 °F (36.8 °C)] 98.3 °F (36.8 °C)  Heart Rate:  [71-80] 80  Resp:  [16-18] 16  BP: (141-176)/(60-86) 176/86  Physical Exam  Pleasant woman  No distress  Sallow skin tone  Alert and oriented x3  Anicteric sclera  Normocephalic and atraumatic head  No thyromegaly  Lungs are clear to auscultation; good air exchange  S1-S2, regular rate and rhythm; murmur present  Soft abdomen, nontender, no guarding, no gross organomegaly  No cyanosis or gross edema  Warm dry skin with good capillary refill time  Has a bruise on left elbow      Results Review:  I have reviewed the labs, radiology results, and diagnostic studies.    Laboratory Data:   Results from last 7 days   Lab Units 06/19/21  0550 06/18/21  0250 06/17/21  0609   WBC 10*3/mm3 7.47 8.02 8.52   HEMOGLOBIN g/dL 9.2* 8.9* 9.9*   HEMATOCRIT % 30.1* 29.4* 32.3*   PLATELETS 10*3/mm3 121* 126* 130*        Results from last 7 days   Lab Units 06/21/21  0551 06/20/21  0625 06/19/21  0550 06/17/21  0609 06/16/21  1706 06/16/21  1245   SODIUM mmol/L 133* 132* 135* 135* 137 137   POTASSIUM mmol/L 4.0 4.2 3.5 3.8 3.1* 3.1*   CHLORIDE mmol/L 94* 94* 95* 94* 95* 94*   CO2 mmol/L 28.0 25.0 27.0 28.0 28.0 28.0   BUN mg/dL 22 16 30* 30* 25* 25*   CREATININE mg/dL 3.59* 2.43* 3.21* 4.26* 4.06* 3.68*   CALCIUM mg/dL 9.1 9.2 9.5 9.2 9.1 9.0   BILIRUBIN mg/dL  --   --   --  0.8 0.6 0.6   ALK PHOS U/L  --   --   --  200* 199* 222*   ALT (SGPT) U/L  --   --   --  18 19 20   AST (SGOT) U/L  --   --   --  29 36* 36*   GLUCOSE mg/dL 165* 181* 124* 97 113* 150*       Culture Data:   Blood Culture   Date Value Ref Range Status   06/19/2021 No growth at 2 days  Preliminary   06/16/2021 Pseudomonas aeruginosa (C)  Final     Comment:     Refer to previous blood culture collected 6/16/2021 at 1840 for VICKEY's.     06/16/2021 Pseudomonas aeruginosa (C)  Final     Urine Culture   Date Value Ref Range Status   06/16/2021 >100,000  CFU/mL Mixed Janeth Isolated  Final       Radiology Data:   Imaging Results (Last 24 Hours)     ** No results found for the last 24 hours. **          I have reviewed the patient's current medications.     Assessment/Plan     Active Hospital Problems    Diagnosis    • **C. difficile diarrhea    • Bacteremia due to Pseudomonas    • Diarrhea    • Hypokalemia    • Altered mental status    • DM2 (diabetes mellitus, type 2) (CMS/[facility])    • End stage renal failure on dialysis (CMS/[facility])    • Diabetes (CMS/[facility])    • HTN (hypertension)        Problem list  C. difficile associated diarrhea in the setting of recent antibiotic use for aspiration pneumonia.  Started on vancomycin p.o. on June 17  Pseudomonas bacteremia -started on Merrem on June 20 with plan of 5-day course.  She was on cefepime prior to this.  Suspected that this is transient via GI source per ID.  ID suspects that this was transient and does not feel extended course of treatment will be necessary particularly given her C. difficile.  Altered mental status felt metabolic encephalopathies in the setting of infection and dialysis.  She is back to her baseline  Hypotension resolved  Hypokalemia  End-stage renal disease on dialysis CTA chest  Diabetes mellitus type 2 -Accu-Chek been 165, 151.      Continue present management  Appreciate consultants  Noted that she is back on carvedilol.  Her blood pressure is adequately controlled at 131/57  She gets people within on dialysis days as well as ferric citrate 3 times daily for anemia felt related to chronic kidney disease    Results for orders placed during the hospital encounter of 01/19/20    Adult Transthoracic Echo Complete W/ Cont if Necessary Per Protocol    Interpretation Summary  · Estimated EF = 55%.  · Left ventricular systolic function is normal.  · Left ventricular diastolic dysfunction (grade II) consistent with pseudonormalization.  · Left atrial cavity size is mildly dilated.  · Mild-to-moderate mitral  valve regurgitation is present      aspirin, 81 mg, Oral, Daily  atropine, 1 drop, Left Eye, Daily  carvedilol, 6.25 mg, Oral, BID With Meals  cholecalciferol, 2,000 Units, Oral, Daily  epoetin vika-epbx, 10,000 Units, Subcutaneous, Once per day on Tue Thu Sat  famotidine, 20 mg, Oral, Once per day on Tue Thu Sat  Ferric Citrate, 4 tablet, Oral, TID With Meals  fluticasone, 2 spray, Nasal, BID  insulin lispro, 2-7 Units, Subcutaneous, TID AC  lactobacillus acidophilus, 1 capsule, Oral, Daily  meropenem, 1 g, Intravenous, Once  meropenem, 500 mg, Intravenous, Q24H  pravastatin, 10 mg, Oral, Nightly  sertraline, 25 mg, Oral, Daily  sodium chloride, 10 mL, Intravenous, Q12H  timolol, 1 drop, Left Eye, Daily  vancomycin, 125 mg, Oral, Q6H        Patient looking towards going home upon discharge.  Patient was previously discharged at [facility].  Review of feedback from therapist showed that she has been under contact-guard assist and appears to be a functional level.  We will review her progress and will notify  if any change that will require pre-CERT to get back to skilled nursing facility.    Discharge Planning: To be determined  Electronically signed by Sundeep Aldridge MD, 06/21/21, 10:47 CDT.

## 2021-06-21 NOTE — PLAN OF CARE
Goal Outcome Evaluation:  Plan of Care Reviewed With: patient        Progress: improving  Outcome Summary: Pt required min assist for supine to sit.  Transfered sit to stand with CGA/min assist.  Amb 40' with CGA/min assist and HHA.  Decreased distance due to needing to use the bathroom.  Will continue to work with pt to increase strength and progress amb.

## 2021-06-21 NOTE — PROGRESS NOTES
Nephrology ([facility]) Progress Note      Patient:  Mini Gentile  YOB: 1960  Date of Service: 6/20/2021  MRN: [id number]   Acct: [id number]   Primary Care Physician: Bharati Dahl APRN  Advance Directive:   Code Status and Medical Interventions:   Ordered at: 06/17/21 0220     Code Status:    CPR     Medical Interventions (Level of Support Prior to Arrest):    Full     Admit Date: 6/16/2021       Hospital Day: 4  Referring Provider: Tejinder Kent MD      Patient personally seen and examined.  Complete chart including Consults, Notes, Operative Reports, Labs, Cardiology, and Radiology studies reviewed as able.        Subjective:  Mini Gentile is a 61 y.o. female  whom we were consulted for end stage renal disease. Maintenance hemodialysis on Tuesday Thursday Saturday at [facility]. Fairly noncompliant with dialysis treatments. She was just discharged from [facility] on 6/8. Unknown if she has been going to HD treatments since then.  Presented to ER after she was noted to be confused at nursing home.       Today, no overnight events.  Denies chest pain, shortness of air, nausea or vomiting.  IV access issues noted.  Nursing present.  Stool improving.       Allergies:  Bupropion, Chantix [varenicline], Gabapentin, Azithromycin, Levofloxacin, Penicillins, and Sulfa antibiotics    Home Meds:  Medications Prior to Admission   Medication Sig Dispense Refill Last Dose   • aspirin 81 MG EC tablet Take 81 mg by mouth Daily.   6/16/2021 at Unknown time   • atropine 1 % ophthalmic solution Administer 1 drop into the left eye Daily.   6/16/2021 at Unknown time   • brimonidine (ALPHAGAN) 0.2 % ophthalmic solution Administer 1 drop into the left eye 2 (two) times a day.   6/16/2021 at Unknown time   • carvedilol (COREG) 6.25 MG tablet Take 6.25 mg by mouth 2 (Two) Times a Day With Meals.   6/16/2021 at Unknown time   • cholecalciferol (VITAMIN D3) 1000 units  tablet Take 2,000 Units by mouth Daily.   6/16/2021 at Unknown time   • dorzolamide (TRUSOPT) 2 % ophthalmic solution Administer 1 drop into the left eye 2 (Two) Times a Day.   6/16/2021 at Unknown time   • epoetin vika-epbx (RETACRIT) 28756 UNIT/ML injection Inject 1 mL under the skin into the appropriate area as directed 3 (Three) Times a Week. Indications: ESRD on Dialysis 6.6 mL  6/16/2021 at Unknown time   • famotidine (PEPCID) 20 MG tablet Take 1 tablet by mouth Daily.   6/16/2021 at Unknown time   • Ferric Citrate 1  MG(Fe) tablet Take 1 tablet by mouth 3 (Three) Times a Day With Meals.   6/16/2021 at Unknown time   • fluticasone (FLONASE) 50 MCG/ACT nasal spray 2 sprays into the nostril(s) as directed by provider 2 (Two) Times a Day.   6/16/2021 at Unknown time   • insulin lispro (humaLOG) 100 UNIT/ML injection Inject under the skin TID AC as per sliding scale:  200-249= 2 units  250-299= 4 units  300-349= 6 units  350-399= 8 units  400-500= 10 units and call MD   6/16/2021 at Unknown time   • metoclopramide (REGLAN) 5 MG tablet Take 1 tablet by mouth 4 (Four) Times a Day Before Meals & at Bedtime.   6/16/2021 at Unknown time   • rosuvastatin (CRESTOR) 5 MG tablet Take 5 mg by mouth every night at bedtime.   6/15/2021 at Unknown time   • saccharomyces boulardii (FLORASTOR) 250 MG capsule Take 250 mg by mouth Daily.   6/16/2021 at Unknown time   • sertraline (ZOLOFT) 25 MG tablet Take 1 tablet by mouth Daily.   6/16/2021 at Unknown time   • timolol (TIMOPTIC) 0.5 % ophthalmic solution Administer 1 drop into the left eye Every Morning.  12 6/16/2021 at Unknown time   • acetaminophen (TYLENOL) 325 MG tablet Take 650 mg by mouth Every 4 (Four) Hours As Needed for Mild Pain  or Fever.   Unknown at Unknown time   • albuterol sulfate  (90 Base) MCG/ACT inhaler Inhale 2 puffs Every 4 (Four) Hours As Needed for Wheezing. 18 g 0 Unknown at Unknown time   • ipratropium-albuterol (DUO-NEB) 0.5-2.5 mg/3 ml  nebulizer Take 3 mL by nebulization Every 4 (Four) Hours As Needed for Wheezing or Shortness of Air.   Unknown at Unknown time   • ondansetron (ZOFRAN) 4 MG tablet Take 1 tablet by mouth Every 6 (Six) Hours As Needed for Nausea or Vomiting. 24 tablet 2 Unknown at Unknown time       Medicines:  Current Facility-Administered Medications   Medication Dose Route Frequency Provider Last Rate Last Admin   • acetaminophen (TYLENOL) tablet 650 mg  650 mg Oral Q4H PRN Bin Guy DO   650 mg at 06/17/21 2032    Or   • acetaminophen (TYLENOL) suppository 650 mg  650 mg Rectal Q4H PRN Bin Guy DO       • albumin human 25 % IV SOLN 12.5 g  12.5 g Intravenous PRN Adi Gonzalez MD   12.5 g at 06/19/21 1030   • aspirin EC tablet 81 mg  81 mg Oral Daily Bin Guy DO   81 mg at 06/20/21 0820   • atropine 1 % ophthalmic solution 1 drop  1 drop Left Eye Daily Bin Guy DO   1 drop at 06/20/21 0820   • brimonidine (ALPHAGAN) 0.2 % ophthalmic solution 1 drop  1 drop Left Eye BID PRN Bin Guy DO   1 drop at 06/19/21 0827   • carvedilol (COREG) tablet 6.25 mg  6.25 mg Oral BID With Meals Bin Guy DO   6.25 mg at 06/20/21 1801   • cholecalciferol (VITAMIN D3) tablet 2,000 Units  2,000 Units Oral Daily Bin Guy DO   2,000 Units at 06/20/21 0820   • dextrose (D50W) 25 g/ 50mL Intravenous Solution 25 g  25 g Intravenous Q15 Min PRN Bin Guy DO       • dextrose (GLUTOSE) oral gel 15 g  15 g Oral Q15 Min PRN Bin Guy DO       • epoetin vika-epbx (RETACRIT) injection 10,000 Units  10,000 Units Subcutaneous Once per day on Tue Thu Sat Bin Guy DO   10,000 Units at 06/19/21 0823   • famotidine (PEPCID) tablet 20 mg  20 mg Oral Once per day on Tue Thu Sat Bin Guy DO   20 mg at 06/19/21 1729   • Ferric Citrate tablet 840 mg  4 tablet Oral TID With Meals Bin Guy DO   840 mg at 06/20/21 1801   • fluticasone (FLONASE) 50 MCG/ACT nasal  spray 2 spray  2 spray Nasal BID Bin Guy DO   2 spray at 06/20/21 2028   • glucagon (human recombinant) (GLUCAGEN DIAGNOSTIC) injection 1 mg  1 mg Subcutaneous Q15 Min PRN Bin Guy DO       • insulin lispro (humaLOG) injection 2-7 Units  2-7 Units Subcutaneous TID AC Bin Guy DO   2 Units at 06/20/21 1227   • ipratropium-albuterol (DUO-NEB) nebulizer solution 3 mL  3 mL Nebulization Q4H PRN Bin Guy DO       • lactobacillus acidophilus (RISAQUAD) capsule 1 capsule  1 capsule Oral Daily Bin Guy DO   1 capsule at 06/20/21 0820   • meropenem (MERREM) 1 g/100 mL 0.9% NS VTB (mbp)  1 g Intravenous Once Fabio Glover MD       • meropenem (MERREM) 500mg/100 mL 0.9% NS IVPB (mbp)  500 mg Intravenous Q24H Fabio Glover MD   500 mg at 06/20/21 1228   • ondansetron (ZOFRAN) tablet 4 mg  4 mg Oral Q6H PRN Bin Guy DO        Or   • ondansetron (ZOFRAN) injection 4 mg  4 mg Intravenous Q6H PRN Bin Guy DO       • pravastatin (PRAVACHOL) tablet 10 mg  10 mg Oral Nightly Bin Guy DO   10 mg at 06/20/21 2028   • sertraline (ZOLOFT) tablet 25 mg  25 mg Oral Daily Bin Guy DO   25 mg at 06/20/21 0820   • sodium chloride 0.9 % bolus 100 mL  100 mL Intravenous PRN Bin Guy DO       • sodium chloride 0.9 % bolus 100 mL  100 mL Intravenous PRN Adi Gonzalez MD       • sodium chloride 0.9 % flush 10 mL  10 mL Intravenous PRN Bin Guy DO       • sodium chloride 0.9 % flush 10 mL  10 mL Intravenous Q12H Bin Guy DO   10 mL at 06/20/21 2029   • sodium chloride 0.9 % flush 10 mL  10 mL Intravenous PRN Bin Guy DO       • timolol (TIMOPTIC) 0.5 % ophthalmic solution 1 drop  1 drop Left Eye Daily Bin Guy DO   1 drop at 06/20/21 0820   • vancomycin oral solution reconstituted 125 mg  125 mg Oral Q6H Bin Guy DO   125 mg at 06/20/21 1801       Past Medical History:  Past Medical History:    Diagnosis Date   • 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    • Chronic otitis media    • Diabetes (CMS/[facility])    • End stage renal disease on dialysis (CMS/[facility])    • Eustachian tube dysfunction    • GERD (gastroesophageal reflux disease)    • Glaucoma    • HTN (hypertension)    • Hyperlipidemia    • Mucoid otitis media    • Noncompliance of patient with renal dialysis (CMS/[facility])    • Tobacco abuse        Past Surgical History:  Past Surgical History:   Procedure Laterality Date   • ARTERIOVENOUS FISTULA     • CATARACT EXTRACTION WITH INTRAOCULAR LENS IMPLANT     •  SECTION     • CHOLECYSTECTOMY     • MYRINGOTOMY W/ TUBES Bilateral    • MYRINGOTOMY W/ TUBES Right    • TUBAL ABDOMINAL LIGATION         Family History  Family History   Problem Relation Age of Onset   • Heart disease Mother    • Diabetes Father    • Colon cancer Neg Hx    • Colon polyps Neg Hx        Social History  Social History     Socioeconomic History   • Marital status: Single     Spouse name: Not on file   • Number of children: Not on file   • Years of education: Not on file   • Highest education level: Not on file   Tobacco Use   • Smoking status: Current Every Day Smoker     Packs/day: 2.00     Years: 6.00     Pack years: 12.00     Types: Cigarettes   • Smokeless tobacco: Never Used   Vaping Use   • Vaping Use: Never used   Substance and Sexual Activity   • Alcohol use: No   • Drug use: No   • Sexual activity: Defer         Review of Systems:  History obtained from chart review and the patient  General ROS: No fever or chills  Respiratory ROS: No cough, shortness of breath, wheezing  Cardiovascular ROS: No chest pain or palpitations  Gastrointestinal ROS: No abdominal pain or melena  Genito-Urinary ROS: No dysuria or hematuria    Objective:  Patient Vitals for the past 24 hrs:   BP Temp Temp src Pulse Resp SpO2 Weight   21 1844 153/60 97.8 °F (36.6 °C) Oral 71 18 98 % --   21 0817 153/67 98 °F (36.7 °C) Oral 77 18 98  % --   06/20/21 0639 -- -- -- -- -- -- 61.3 kg (135 lb 3.2 oz)   06/20/21 0439 146/58 98 °F (36.7 °C) Oral 77 16 96 % --   06/19/21 2310 137/63 98.3 °F (36.8 °C) Oral 77 16 93 % --       Intake/Output Summary (Last 24 hours) at 6/20/2021 2212  Last data filed at 6/19/2021 2310  Gross per 24 hour   Intake 240 ml   Output --   Net 240 ml     General: awake/alert   Chest:  clear to auscultation bilaterally without respiratory distress  CVS: regular rate and rhythm  Abdominal: soft, nontender, positive bowel sounds  Extremities: no cyanosis or edema  Skin: warm and dry without rash      Labs:  Results from last 7 days   Lab Units 06/19/21  0550 06/18/21  0250 06/17/21  0609   WBC 10*3/mm3 7.47 8.02 8.52   HEMOGLOBIN g/dL 9.2* 8.9* 9.9*   HEMATOCRIT % 30.1* 29.4* 32.3*   PLATELETS 10*3/mm3 121* 126* 130*         Results from last 7 days   Lab Units 06/20/21  0625 06/19/21  0550 06/18/21  0250 06/17/21  0609 06/16/21  1706 06/16/21  1245   SODIUM mmol/L 132* 135* 138 135* 137 137   POTASSIUM mmol/L 4.2 3.5 3.8 3.8 3.1* 3.1*   CHLORIDE mmol/L 94* 95* 98 94* 95* 94*   CO2 mmol/L 25.0 27.0 29.0 28.0 28.0 28.0   BUN mg/dL 16 30* 16 30* 25* 25*   CREATININE mg/dL 2.43* 3.21* 2.40* 4.26* 4.06* 3.68*   CALCIUM mg/dL 9.2 9.5 9.0 9.2 9.1 9.0   BILIRUBIN mg/dL  --   --   --  0.8 0.6 0.6   ALK PHOS U/L  --   --   --  200* 199* 222*   ALT (SGPT) U/L  --   --   --  18 19 20   AST (SGOT) U/L  --   --   --  29 36* 36*   GLUCOSE mg/dL 181* 124* 123* 97 113* 150*       Radiology:   Imaging Results (Last 72 Hours)     ** No results found for the last 72 hours. **          Culture:  Blood Culture   Date Value Ref Range Status   06/19/2021 No growth at less than 24 hours  Preliminary   06/16/2021 Pseudomonas aeruginosa (C)  Preliminary     Comment:     Refer to previous blood culture collected 6/16/2021 at 1840 for VICKEY's.     06/16/2021 Pseudomonas aeruginosa (C)  Preliminary     Urine Culture   Date Value Ref Range Status   06/16/2021  >100,000 CFU/mL Mixed Janeth Isolated  Final         Assessment   End stage renal disease on HD TTS  Metabolic encephalopathy  Sepsis  Type 2 diabetes with hypoglycemia  Hypertension with periods of hypotension  Anemia of CKD  COPD  Hypokalemia  C Diff colitis     Plan:  HD 6/17 and per routine schedule TTS  Monitor labs  Abx per primary  D/w pt/nursing  Avoid PICC in HD pt if at all possible      Adi Gonzalez MD  6/20/2021  22:12 CDT

## 2021-06-21 NOTE — THERAPY TREATMENT NOTE
Acute Care - Occupational Therapy Treatment Note  [facility]     Patient Name: Mini Gentile  : 1960  MRN: [id number]  Today's Date: 2021             Admit Date: 2021       ICD-10-CM ICD-9-CM   1. Altered mental status, unspecified altered mental status type  R41.82 780.97   2. Hypoxia  R09.02 799.02   3. Elevated troponin  R77.8 790.6   4. End stage renal disease (CMS/HCC)  N18.6 585.6   5. Anemia, unspecified type  D64.9 285.9   6. Hypotension, unspecified hypotension type  I95.9 458.9   7. Fall, initial encounter  W19.XXXA E888.9   8. Dysphagia, unspecified type  R13.10 787.20   9. Decreased activities of daily living (ADL)  Z78.9 V49.89   10. 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   • Eustachian tube dysfunction   • Chronic otitis media   • Pneumonia of left upper lobe due to Streptococcus (CMS/[facility])   • End stage renal failure on dialysis (CMS/[facility])   • Diabetes (CMS/[facility])   • Glaucoma   • HTN (hypertension)   • Proteinuria   • DM2 (diabetes mellitus, type 2) (CMS/[facility])   • Chronic anemia   • Acute pulmonary edema (CMS/[facility])   • Non-compliance with renal dialysis (CMS/[facility])   • Elevated troponin due to ESRD    • Hyperkalemia   • Encephalopathy, metabolic, due to uremia   • High anion gap metabolic acidosis due to uremia   • Respiratory distress   • Acute diastolic heart failure (CMS/[facility])   • Lymph node enlargement, left axillary measuring 1.1 cm -follow-up for primary care   • Nausea vomiting and diarrhea   • Acute diastolic heart failure (CMS/HCC)   • Diabetes mellitus with ophthalmic complication (CMS/[facility])   • Tobacco abuse   • Urinary tract infection with hematuria   • Pneumonia due to infectious organism   • Abnormal urine findings   • Closed fracture of right proximal humerus   • Hyponatremia   • Hypothyroidism (acquired)   • Forehead laceration secondary to fall   • Heme positive stool   • Dialysis  AV fistula malfunction, initial encounter (CMS/[facility])   • Aspiration into airway   • Bronchospasm, acute   • Altered mental status   • Protein-calorie malnutrition (CMS/[facility])   • Pupil irregular   • Sepsis (CMS/[facility])   • Hypoglycemia   • Hyponatremia   • Diarrhea   • Hypokalemia   • C. 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    • Chronic otitis media    • Diabetes (CMS/[facility])    • End stage renal disease on dialysis (CMS/[facility])    • Eustachian tube dysfunction    • GERD (gastroesophageal reflux disease)    • Glaucoma    • HTN (hypertension)    • Hyperlipidemia    • Mucoid otitis media    • Noncompliance of patient with renal dialysis (CMS/[facility])    • Tobacco abuse      Past Surgical History:   Procedure Laterality Date   • ARTERIOVENOUS FISTULA     • CATARACT EXTRACTION WITH INTRAOCULAR LENS IMPLANT     •  SECTION     • CHOLECYSTECTOMY     • MYRINGOTOMY W/ TUBES Bilateral    • MYRINGOTOMY W/ TUBES Right    • TUBAL ABDOMINAL LIGATION              OT ASSESSMENT FLOWSHEET (last 12 hours)      OT Evaluation and Treatment     Row Name 21 1000                   OT Time and Intention    Subjective Information  complains of;weakness;fatigue;pain  -CJ        Document Type  therapy note (daily note)  -CJ        Mode of Treatment  occupational therapy  -CJ        Patient Effort  adequate  -           General Information    Existing Precautions/Restrictions  fall;non-weight bearing NWB RUE!  -        Limitations/Impairments  visual  -           Pain Assessment    Additional Documentation  Pain Scale: Word Pre/Post-Treatment (Group)  -           Pain Scale: Numbers Pre/Post-Treatment    Pain Intervention(s)  Rest  -CJ           Pain Scale: Word Pre/Post-Treatment    Pain: Word Scale, Pretreatment  2 - mild pain  -CJ        Posttreatment Pain Rating  2 - mild pain  -        Pain Location - Side  Right  -         Pain Location - Orientation  upper  -        Pain Location  extremity;shoulder  -           Bed Mobility    Comment (Bed Mobility)  fowlers in bed!  -           Motor Skills    Motor Skills  therapeutic exercise  -        Therapeutic Exercise  elbow/forearm  -           Elbow/Forearm (Therapeutic Exercise)    Elbow/Forearm (Therapeutic Exercise)  AROM (active range of motion);strengthening exercise  -        Elbow/Forearm AROM (Therapeutic Exercise)  bilateral;flexion;extension;sitting;10 repetitions;2 sets  -        Elbow/Forearm Strengthening (Therapeutic Exercise)  bilateral;flexion;extension;sitting;2 lb free weight;1 lb free weight  -CJ           Wound 04/04/21 0142 coccyx    Wound - Properties Group Placement Date: 04/04/21  -VT Placement Time: 0142 -VT Location: coccyx  -VT    Retired Wound - Properties Group Date first assessed: 04/04/21  -VT Time first assessed: 0142 -VT Location: coccyx  -VT       Positioning and Restraints    Pre-Treatment Position  in bed  -CJ        Post Treatment Position  bed  -CJ        In Bed  fowlers;call light within reach;encouraged to call for assist;side rails up x2;RUE elevated;LUE elevated  -           Progress Summary (OT)    Progress Toward Functional Goals (OT)  progress toward functional goals is fair  -        Barriers to Overall Progress (OT)  Fall/NWB Rue/pain!  -        Impairments Still Limiting Function (OT)  continue with ot poc!  -          User Key  (r) = Recorded By, (t) = Taken By, (c) = Cosigned By    Initials Name Effective Dates    CJ Jacky Zhang COTA/L 06/16/21 -     VT Alyx Watson RN 08/02/16 - 06/15/21         Occupational Therapy Education                 Title: PT OT SLP Therapies (In Progress)     Topic: Occupational Therapy (Done)     Point: ADL training (Done)     Description:   Instruct learner(s) on proper safety adaptation and remediation techniques during self care or transfers.   Instruct in proper  use of assistive devices.              Learning Progress Summary           Patient Acceptance, E,TB, VU,DU,NR by  at 6/20/2021 1357    Comment: Pt. performed adl shower/dressing with this hood/l assisting!    Acceptance, E, VU by  at 6/18/2021 1104                   Point: Home exercise program (Done)     Description:   Instruct learner(s) on appropriate technique for monitoring, assisting and/or progressing therapeutic exercises/activities.              Learning Progress Summary           Patient Acceptance, E,TB, VU,DU,NR by  at 6/21/2021 1000    Comment: Pt. performed ue exs with 2lb/ bar and 1lb. wrist wt!Elbow flex/ext only!                   Point: Precautions (Done)     Description:   Instruct learner(s) on prescribed precautions during self-care and functional transfers.              Learning Progress Summary           Patient Acceptance, E,TB, VU,DU,NR by  at 6/21/2021 1000    Comment: Pt. performed ue exs with 2lb/ bar and 1lb. wrist wt!Elbow flex/ext only!    Acceptance, E,TB, VU,DU,NR by  at 6/20/2021 1357    Comment: Pt. performed adl shower/dressing with this hood/l assisting!    Acceptance, E, VU by  at 6/18/2021 1104                   Point: Body mechanics (Done)     Description:   Instruct learner(s) on proper positioning and spine alignment during self-care, functional mobility activities and/or exercises.              Learning Progress Summary           Patient Acceptance, E,TB, VU,DU,NR by  at 6/21/2021 1000    Comment: Pt. performed ue exs with 2lb/ bar and 1lb. wrist wt!Elbow flex/ext only!    Acceptance, E,TB, VU,DU,NR by  at 6/20/2021 1357    Comment: Pt. performed adl shower/dressing with this hood/l assisting!    Acceptance, E, VU by  at 6/18/2021 1104                               User Key     Initials Effective Dates Name Provider Type Discipline     06/16/21 -  Jacky Zhang HOOD/L Occupational Therapy Assistant OT     06/16/21 -  Kami Chapman OTR/L  Occupational Therapist OT                  OT Recommendation and Plan     Progress Toward Functional Goals (OT): progress toward functional goals is fair  Plan of Care Review  Plan of Care Reviewed With: patient  Progress: improving  Outcome Summary: Pt. performed elbow flex/ext only sec. Rue pain and Nwb restrictions until notified otherwise!  Plan of Care Reviewed With: patient  Outcome Summary: Pt. performed elbow flex/ext only sec. Rue pain and Nwb restrictions until notified otherwise!    Outcome Measures     Row Name 06/21/21 1000 06/20/21 1357          How much help from another is currently needed...    Putting on and taking off regular lower body clothing?  2  -CJ  2  -CJ     Bathing (including washing, rinsing, and drying)  2  -CJ  2  -CJ     Toileting (which includes using toilet bed pan or urinal)  3  -CJ  3  -CJ     Putting on and taking off regular upper body clothing  3  -CJ  3  -CJ     Taking care of personal grooming (such as brushing teeth)  4  -CJ  4  -CJ     Eating meals  4  -CJ  4  -CJ     AM-PAC 6 Clicks Score (OT)  18  -CJ  18  -CJ        Functional Assessment    Outcome Measure Options  AM-PAC 6 Clicks Daily Activity (OT)  -  AM-PAC 6 Clicks Daily Activity (OT)  -       User Key  (r) = Recorded By, (t) = Taken By, (c) = Cosigned By    Initials Name Provider Type    CJ Jacky Zhang COTA/L Occupational Therapy Assistant          Time Calculation:   Time Calculation- OT     Row Name 06/21/21 1000 06/21/21 0904          Time Calculation- OT    OT Start Time  1000  -CJ  --     OT Stop Time  1025  -CJ  --     OT Time Calculation (min)  25 min  -  --     Total Timed Code Minutes- OT  25 minute(s)  -  --     OT Received On  06/21/21 -CJ  --        Timed Charges    19571 - OT Therapeutic Exercise Minutes  25 -CJ  --     68248 - Gait Training Minutes   --  10  -NAOMI        Total Minutes    Timed Charges Total Minutes  25 -CJ  10  -NAOMI      Total Minutes  25 -CJ  10  -NAOMI       User Key   (r) = Recorded By, (t) = Taken By, (c) = Cosigned By    Initials Name Provider Type    CJ Jacky Zhang COTA/L Occupational Therapy Assistant    Domingo Whitaker, PTA Physical Therapy Assistant        Therapy Charges for Today     Code Description Service Date Service Provider Modifiers Qty    [id number] HC OT SELF CARE/MGMT/TRAIN EA 15 MIN 6/20/2021 Jacky Zhang COTA/L GO 3    38198969423 HC OT THER PROC EA 15 MIN 6/21/2021 Jacky Zhang COTA/L GO 2               TAMIKO Alvarez  6/21/2021

## 2021-06-21 NOTE — PAYOR COMM NOTE
"Mini Peña (61 y.o. Female) [id number]   Please review for reconsideration of inpt denial    [facility]   Additional clinical     sigifredo phone    Fax     Inf dz consult    Date of Birth Social Security Number Address Home Phone MRN    1960  [id number] [address] [phone] [id number]    Hinduism Marital Status          Other Single       Admission Date Admission Type Admitting Provider Attending Provider Department, Room/Bed    6/16/21 Emergency Sundeep Aldridge MD [place], Glenn Riego, MD [facility] 3C, 371/1    Discharge Date Discharge Disposition Discharge Destination                       Attending Provider: Sundeep Aldridge MD    Allergies: Bupropion, Chantix [Varenicline], Gabapentin, Azithromycin, Levofloxacin, Penicillins, Sulfa Antibiotics    Isolation: Spore   Infection: C.difficile (06/17/21)   Code Status: CPR    Ht: 147.3 cm (58\")   Wt: 61.9 kg (136 lb 6.4 oz)    Admission Cmt: None   Principal Problem: C. difficile diarrhea [A04.72]                 Active Insurance as of 6/16/2021     Primary Coverage     Payor Plan Insurance Group Employer/Plan Group    [facility] MEDICARE REPLACEMENT WELLCARE MEDICARE REPLACEMENT      Payor Plan Address Payor Plan Phone Number Payor Plan Fax Number Effective Dates    [address] [address] [phone]  4/1/2021 - None Entered    [facility] [id number]       Subscriber Name Subscriber Birth Date Member ID       PAOLAAMANDAMINI 1960 [id number]           Secondary Coverage     Payor Plan Insurance Group Employer/Plan Group    WELLCARE [facility] WELLCARE MEDICAID      Payor Plan Address Payor Plan Phone Number Payor Plan Fax Number Effective Dates    [address] [address] [phone]  11/4/2016 - None Entered    [facility] [id number]       Subscriber Name Subscriber Birth Date Member ID       MINI PEÑA 1960 [id number]                 Emergency Contacts     "  (Rel.) Home Phone Work Phone Mobile Phone    Erica Bowling (Other) [phone] -- [phone]    Jose Bowling (Significant Other) [phone] -- [phone]    Arlette Barker (Daughter) -- -- [phone]    VamshiJulio Cesar jang (Son) [phone] -- [phone]              Current Facility-Administered Medications   Medication Dose Route Frequency Provider Last Rate Last Admin   • acetaminophen (TYLENOL) tablet 650 mg  650 mg Oral Q4H PRN Bin Guy DO   650 mg at 06/17/21 2032    Or   • acetaminophen (TYLENOL) suppository 650 mg  650 mg Rectal Q4H PRN Bin Guy DO       • aspirin EC tablet 81 mg  81 mg Oral Daily Bin Guy DO   81 mg at 06/20/21 0820   • atropine 1 % ophthalmic solution 1 drop  1 drop Left Eye Daily Bin Guy DO   1 drop at 06/20/21 0820   • brimonidine (ALPHAGAN) 0.2 % ophthalmic solution 1 drop  1 drop Left Eye BID PRN Bin Guy DO   1 drop at 06/19/21 0827   • carvedilol (COREG) tablet 6.25 mg  6.25 mg Oral BID With Meals Bin Guy DO   6.25 mg at 06/20/21 1801   • cholecalciferol (VITAMIN D3) tablet 2,000 Units  2,000 Units Oral Daily Bin Guy DO   2,000 Units at 06/20/21 0820   • dextrose (D50W) 25 g/ 50mL Intravenous Solution 25 g  25 g Intravenous Q15 Min PRN Bin Guy DO       • dextrose (GLUTOSE) oral gel 15 g  15 g Oral Q15 Min PRN Bin Guy DO       • epoetin vika-epbx (RETACRIT) injection 10,000 Units  10,000 Units Subcutaneous Once per day on Tue Thu Sat Bin Guy DO   10,000 Units at 06/19/21 0823   • famotidine (PEPCID) tablet 20 mg  20 mg Oral Once per day on Tue Thu Sat Bin Guy DO   20 mg at 06/19/21 1729   • Ferric Citrate tablet 840 mg  4 tablet Oral TID With Meals Bin Guy,    840 mg at 06/20/21 1801   • fluticasone (FLONASE) 50 MCG/ACT nasal spray 2 spray  2 spray Nasal BID Bin Guy,    2 spray at 06/20/21 2028   • glucagon (human recombinant) (GLUCAGEN  DIAGNOSTIC) injection 1 mg  1 mg Subcutaneous Q15 Min PRN Bni Guy DO       • insulin lispro (humaLOG) injection 2-7 Units  2-7 Units Subcutaneous TID AC Bin Guy DO   2 Units at 06/20/21 1227   • ipratropium-albuterol (DUO-NEB) nebulizer solution 3 mL  3 mL Nebulization Q4H PRN Bin Guy DO       • lactobacillus acidophilus (RISAQUAD) capsule 1 capsule  1 capsule Oral Daily Bin Guy DO   1 capsule at 06/20/21 0820   • meropenem (MERREM) 1 g/100 mL 0.9% NS VTB (mbp)  1 g Intravenous Once Fabio Glover MD       • meropenem (MERREM) 500mg/100 mL 0.9% NS IVPB (mbp)  500 mg Intravenous Q24H Fabio Glover MD   500 mg at 06/20/21 1228   • ondansetron (ZOFRAN) tablet 4 mg  4 mg Oral Q6H PRN Bin Guy DO        Or   • ondansetron (ZOFRAN) injection 4 mg  4 mg Intravenous Q6H PRN Bin Guy DO       • pravastatin (PRAVACHOL) tablet 10 mg  10 mg Oral Nightly Bin Guy DO   10 mg at 06/20/21 2028   • sertraline (ZOLOFT) tablet 25 mg  25 mg Oral Daily Bin Guy DO   25 mg at 06/20/21 0820   • sodium chloride 0.9 % bolus 100 mL  100 mL Intravenous PRN Bin Guy DO       • sodium chloride 0.9 % bolus 100 mL  100 mL Intravenous PRN Adi Gonzalez MD       • sodium chloride 0.9 % flush 10 mL  10 mL Intravenous PRN Bin Guy DO       • sodium chloride 0.9 % flush 10 mL  10 mL Intravenous Q12H Bin Guy DO   10 mL at 06/20/21 2029   • sodium chloride 0.9 % flush 10 mL  10 mL Intravenous PRN Bin Guy DO       • timolol (TIMOPTIC) 0.5 % ophthalmic solution 1 drop  1 drop Left Eye Daily Bin Guy DO   1 drop at 06/20/21 0820   • vancomycin oral solution reconstituted 125 mg  125 mg Oral Q6H Bin Guy, DO   125 mg at 06/21/21 0611        Consult Notes (last 7 days) (Notes from 06/14/21 through 06/21/21)      Vimal Jewell, PharmD at 06/17/21 0502          Pharmacy Dosing Service  Antimicrobial Dosing   "Cefepime & Vancomycin    Assessment/Action/Plan:  Pharmacy to dose Cefepime and Vancomycin requests for dialysis patient for Sepsis. Initiated the following tentative dosing regimens until nephrology determines dialysis to be utilized while hospitalized.    Initiated renally-adjusted/HD patient extended-infusion dosing of Cefepime 2 gm IV over 4 hours every 24 hours, following initial dose given in ER. Current end date/final dose: 6/23/21 at 1800.    Initiated Vancomycin 750 mg IV every 48 hours, following a 1,000 mg dose given in ER. No levels ordered at this time. Current end date/final dose: 6/24/21 at 2000.    Pharmacy will continue to monitor daily and make further adjustment(s) accordingly.     Subjective:  Mini Gentile is a 61 y.o. female with a  \"Pharmacy to Dose Cefepime and Vancomycin\" consult for the treatment of Sepsis , day 2 of treatment.    Objective:  Ht: 147.3 cm (58\"); Wt: 69.6 kg (153 lb 7 oz)  Estimated Creatinine Clearance: 12.6 mL/min (A) (by C-G formula based on SCr of 4.06 mg/dL (H)).   Creatinine   Date Value Ref Range Status   06/16/2021 4.06 (H) 0.57 - 1.00 mg/dL Final   06/16/2021 3.68 (H) 0.57 - 1.00 mg/dL Final      Lab Results   Component Value Date    WBC 6.65 06/16/2021    WBC 5.45 06/16/2021      Baseline culture results:  Microbiology Results (last 10 days)       Procedure Component Value - Date/Time    Clostridium Difficile Toxin - Stool, Per Rectum [[id number]]  (Abnormal) Collected: 06/17/21 0212    Lab Status: Final result Specimen: Stool from Per Rectum Updated: 06/17/21 0322    Narrative:      The following orders were created for panel order Clostridium Difficile Toxin - Stool, Per Rectum.  Procedure                               Abnormality         Status                     ---------                               -----------         ------                     Clostridium Difficile To...[[id number]]  Abnormal            Final result                 Please view results " for these tests on the individual orders.    Gastrointestinal Panel, PCR - Stool, Per Rectum [[id number]]  (Normal) Collected: 06/17/21 0212    Lab Status: Final result Specimen: Stool from Per Rectum Updated: 06/17/21 0342     Campylobacter Not Detected     Plesiomonas shigelloides Not Detected     Salmonella Not Detected     Vibrio Not Detected     Vibrio cholerae Not Detected     Yersinia enterocolitica Not Detected     Enteroaggregative E. coli (EAEC) Not Detected     Enteropathogenic E. coli (EPEC) Not Detected     Enterotoxigenic E. coli (ETEC) lt/st Not Detected     Shiga-like toxin-producing E. coli (STEC) stx1/stx2 Not Detected     Shigella/Enteroinvasive E. coli (EIEC) Not Detected     Cryptosporidium Not Detected     Cyclospora cayetanensis Not Detected     Entamoeba histolytica Not Detected     Giardia lamblia Not Detected     Adenovirus F40/41 Not Detected     Astrovirus Not Detected     Norovirus GI/GII Not Detected     Rotavirus A Not Detected     Sapovirus (I, II, IV or V) Not Detected    Narrative:      If Aeromonas, Staphylococcus aureus or Bacillus cereus are suspected, please order [id number]: Stool Culture, Aeromonas, S aureus, B Cereus.    Clostridium Difficile Toxin, PCR - Stool, Per Rectum [[id number]]  (Abnormal) Collected: 06/17/21 0212    Lab Status: Final result Specimen: Stool from Per Rectum Updated: 06/17/21 0322     C. Difficile Toxins by PCR Positive    Narrative:      Performance characteristics of test not established for patients <2 years of age.    COVID-19,[facility] IN-HOUSE,Nasal Swab No Transport Media 3-4 HR TAT - Swab, Nasal Cavity [[id number]]  (Normal) Collected: 06/16/21 1813    Lab Status: Final result Specimen: Swab from Nasal Cavity Updated: 06/16/21 1912     COVID19 Not Detected    Narrative:      Fact sheet for providers: https://www.fda.gov/media/702903/download     Fact sheet for patients: https://www.fda.gov/media/782129/download    Test performed by PCR.    Consider  negative results in combination with clinical observations, patient history, and epidemiological information.    COVID PRE-OP / PRE-PROCEDURE SCREENING ORDER (NO ISOLATION) - Swab, Nasal Cavity [[id number]]  (Normal) Collected: 06/08/21 1613    Lab Status: Final result Specimen: Swab from Nasal Cavity Updated: 06/08/21 1717    Narrative:      The following orders were created for panel order COVID PRE-OP / PRE-PROCEDURE SCREENING ORDER (NO ISOLATION) - Swab, Nasal Cavity.  Procedure                               Abnormality         Status                     ---------                               -----------         ------                     COVID-19,[facility]OB[facility]...[[id number]]  Normal              Final result                 Please view results for these tests on the individual orders.    COVID-19,[facility] IN-HOUSE,Nasal Swab No Transport Media 3-4 HR TAT - Swab, Nasal Cavity [[id number]]  (Normal) Collected: 06/08/21 1613    Lab Status: Final result Specimen: Swab from Nasal Cavity Updated: 06/08/21 1717     COVID19 Not Detected    Narrative:      Fact sheet for providers: https://www.fda.gov/media/709115/download     Fact sheet for patients: https://www.fda.gov/media/454119/download    Test performed by PCR.    Consider negative results in combination with clinical observations, patient history, and epidemiological information.            Vimal Jewell, Marisol  06/17/21 04:57 CDT      Electronically signed by Vimal Jewell, PharmD at 06/17/21 0502     Gomez Lemos, APRN at 06/17/21 1003      Consult Orders    1. Inpatient Nephrology Consult [[id number]] ordered by Tejinder Kent MD at 06/17/21 0220          Attestation signed by Adi Gonzalez MD at 06/17/21 6048    I have independently interviewed and examined the patient and reviewed the laboratory, imaging, notes and all other records as available.  I have discussed key elements of the care plan with the APRN and agree with the  findings and care plan documented above except as noted.      Subjective:  Initially consulted for end stage renal disease. Maintenance hemodialysis on Tuesday Thursday Saturday at [facility]. Fairly noncompliant with dialysis treatments. She was just discharged from [facility] on 6/8. Unknown if she has been going to HD treatments since then.  Presented to ER after she was noted to be confused at nursing home.  Patient initially minimally responsive but is somewhat more interactive today. Still lethargic but will wake and answer some questions. Complaints of diarrhea recently, and stool has tested positive for C Diff.  No issues with HD today.  No events per nursing.    Objective:  Vitals/labs/studies/notes all reviewed  Exam independently verified with additional comments or findings as noted:  Ext: no edema    Assessment:  End stage renal disease on HD TTS  Metabolic encephalopathy  Sepsis  Type 2 diabetes with hypoglycemia  Hypertension with periods of hypotension  Anemia of CKD  COPD  Hypokalemia  C Diff colitis     Plan:  HD today and per routine schedule  Monitor labs  Abx per primary  D/w pt/family/nursing    Adi Gonzalez MD  6/17/2021  23:25 CDT                    Nephrology ([facility]) Consult Note      Patient:  Mini Gentile  YOB: 1960  Date of Service: 6/17/2021  MRN: [id number]   Acct: [id number]   Primary Care Physician: Bharati Dahl APRN  Advance Directive:   Code Status and Medical Interventions:   Ordered at: 06/17/21 0220     Code Status:    CPR     Medical Interventions (Level of Support Prior to Arrest):    Full     Admit Date: 6/16/2021       Hospital Day: 1  Referring Provider: Tejinder Kent MD      Patient personally seen and examined.  Complete chart including Consults, Notes, Operative Reports, Labs, Cardiology, and Radiology studies reviewed as able.        Subjective:  Mini Gentile is a 61 y.o. female  whom  we were consulted for end stage renal disease. Maintenance hemodialysis on Tuesday Thursday Saturday at [facility]. Fairly noncompliant with dialysis treatments. She was just discharged from [facility] on 6/08. Unknown if she has been going to HD treatments since then.  Presented to ER after she was noted to be confused at nursing home.  Patient initially minimally responsive but is somewhat more interactive today. Still lethargic but will wake and answer some questions. Complaints of diarrhea recently, and stool has tested positive for C Diff.    Allergies:  Bupropion, Chantix [varenicline], Gabapentin, Azithromycin, Levofloxacin, Penicillins, and Sulfa antibiotics    Home Meds:  Medications Prior to Admission   Medication Sig Dispense Refill Last Dose   • albuterol sulfate  (90 Base) MCG/ACT inhaler Inhale 2 puffs Every 4 (Four) Hours As Needed for Wheezing. (Patient taking differently: Inhale 2 puffs Every 6 (Six) Hours As Needed for Wheezing.) 18 g 0    • aspirin 81 MG EC tablet Take 81 mg by mouth Daily.      • atropine 1 % ophthalmic solution Administer 1 drop into the left eye Daily.      • brimonidine (ALPHAGAN) 0.2 % ophthalmic solution Administer 1 drop into the left eye 2 (Two) Times a Day As Needed.      • carvedilol (COREG) 6.25 MG tablet Take 6.25 mg by mouth 2 (Two) Times a Day With Meals.      • cholecalciferol (VITAMIN D3) 1000 units tablet Take 2,000 Units by mouth Daily.      • dorzolamide (TRUSOPT) 2 % ophthalmic solution Administer 1 drop into the left eye 2 (Two) Times a Day.      • epoetin vika-epbx (RETACRIT) 15045 UNIT/ML injection Inject 1 mL under the skin into the appropriate area as directed 3 (Three) Times a Week. Indications: ESRD on Dialysis 6.6 mL     • famotidine (PEPCID) 20 MG tablet Take 1 tablet by mouth Daily.      • Ferric Citrate 1  MG(Fe) tablet Take 4 tablets by mouth 3 (Three) Times a Day With Meals. 270 tablet     • fluticasone (FLONASE) 50 MCG/ACT  nasal spray 2 sprays into the nostril(s) as directed by provider 2 (Two) Times a Day.      • insulin lispro (humaLOG) 100 UNIT/ML injection Inject 2-7 Units under the skin into the appropriate area as directed 3 (Three) Times a Day Before Meals.  12    • ipratropium-albuterol (DUO-NEB) 0.5-2.5 mg/3 ml nebulizer Take 3 mL by nebulization Every 4 (Four) Hours As Needed for Wheezing or Shortness of Air.      • lactobacillus acidophilus (RISAQUAD) capsule capsule Take 1 capsule by mouth Daily. 30 capsule 2    • metoclopramide (REGLAN) 5 MG tablet Take 1 tablet by mouth 4 (Four) Times a Day Before Meals & at Bedtime.      • ondansetron (ZOFRAN) 4 MG tablet Take 1 tablet by mouth Every 6 (Six) Hours As Needed for Nausea or Vomiting. 24 tablet 2    • pravastatin (PRAVACHOL) 10 MG tablet Take 10 mg by mouth Every Night.      • sertraline (ZOLOFT) 25 MG tablet Take 1 tablet by mouth Daily.      • timolol (TIMOPTIC) 0.5 % ophthalmic solution Administer 1 drop into the left eye Every Morning.  12        Medicines:  Current Facility-Administered Medications   Medication Dose Route Frequency Provider Last Rate Last Admin   • acetaminophen (TYLENOL) tablet 650 mg  650 mg Oral Q4H PRN Tejinder Kent MD        Or   • acetaminophen (TYLENOL) suppository 650 mg  650 mg Rectal Q4H PRN Tejinder Kent MD       • aspirin EC tablet 81 mg  81 mg Oral Daily Tejinder Kent MD       • atropine 1 % ophthalmic solution 1 drop  1 drop Left Eye Daily Tejinder Kent MD       • brimonidine (ALPHAGAN) 0.2 % ophthalmic solution 1 drop  1 drop Left Eye BID PRN Tejinder Kent MD       • cholecalciferol (VITAMIN D3) tablet 2,000 Units  2,000 Units Oral Daily Tejinder Kent MD       • dextrose (D50W) 25 g/ 50mL Intravenous Solution 25 g  25 g Intravenous Q15 Min PRN Tejinder Kent MD       • dextrose (GLUTOSE) oral gel 15 g  15 g Oral Q15 Min PRN Tejinder Kent MD       • epoetin vika-epbx (RETACRIT) injection 10,000 Units   10,000 Units Subcutaneous Once per day on Tue Thu Sat Tejinder Kent MD       • famotidine (PEPCID) tablet 20 mg  20 mg Oral Daily Tejinder Kent MD       • Ferric Citrate tablet 840 mg  4 tablet Oral TID With Meals Tejinder Kent MD       • fluticasone (FLONASE) 50 MCG/ACT nasal spray 2 spray  2 spray Nasal BID Tejinder Kent MD       • glucagon (human recombinant) (GLUCAGEN DIAGNOSTIC) injection 1 mg  1 mg Subcutaneous Q15 Min PRN Tejinder Kent MD       • insulin lispro (humaLOG) injection 2-7 Units  2-7 Units Subcutaneous TID AC Tejinder Kent MD       • ipratropium-albuterol (DUO-NEB) nebulizer solution 3 mL  3 mL Nebulization Q4H PRN Tejinder Kent MD       • lactated ringers infusion  50 mL/hr Intravenous Continuous Tejinder Kent MD 50 mL/hr at 06/17/21 0308 50 mL/hr at 06/17/21 0308   • lactobacillus acidophilus (RISAQUAD) capsule 1 capsule  1 capsule Oral Daily Tejinder Kent MD       • ondansetron (ZOFRAN) tablet 4 mg  4 mg Oral Q6H PRN Tejinder Kent MD        Or   • ondansetron (ZOFRAN) injection 4 mg  4 mg Intravenous Q6H PRN Tejinder Kent MD       • pravastatin (PRAVACHOL) tablet 10 mg  10 mg Oral Nightly Tejinder Kent MD       • sertraline (ZOLOFT) tablet 25 mg  25 mg Oral Daily Tejinder Kent MD       • sodium chloride 0.9 % flush 10 mL  10 mL Intravenous PRN Tejinder Kent MD       • sodium chloride 0.9 % flush 10 mL  10 mL Intravenous Q12H Tejinder Kent MD       • sodium chloride 0.9 % flush 10 mL  10 mL Intravenous PRN Tejinder Kent MD       • timolol (TIMOPTIC) 0.5 % ophthalmic solution 1 drop  1 drop Left Eye Daily Tejinder Kent MD       • vancomycin oral solution reconstituted 125 mg  125 mg Oral Q6H Bin Guy DO           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    • Chronic otitis media    • Diabetes (CMS/HCC)    • End stage renal disease  on dialysis (CMS/[facility])    • Eustachian tube dysfunction    • GERD (gastroesophageal reflux disease)    • Glaucoma    • HTN (hypertension)    • Hyperlipidemia    • Mucoid otitis media    • Noncompliance of patient with renal dialysis (CMS/[facility])    • Tobacco abuse        Past Surgical History:  Past Surgical History:   Procedure Laterality Date   • ARTERIOVENOUS FISTULA     • CATARACT EXTRACTION WITH INTRAOCULAR LENS IMPLANT     •  SECTION     • CHOLECYSTECTOMY     • MYRINGOTOMY W/ TUBES Bilateral    • MYRINGOTOMY W/ TUBES Right    • TUBAL ABDOMINAL LIGATION         Family History  Family History   Problem Relation Age of Onset   • Heart disease Mother    • Diabetes Father    • Colon cancer Neg Hx    • Colon polyps Neg Hx        Social History  Social History     Socioeconomic History   • Marital status: Single     Spouse name: Not on file   • Number of children: Not on file   • Years of education: Not on file   • Highest education level: Not on file   Tobacco Use   • Smoking status: Current Every Day Smoker     Packs/day: 2.00     Years: 6.00     Pack years: 12.00     Types: Cigarettes   • Smokeless tobacco: Never Used   Vaping Use   • Vaping Use: Never used   Substance and Sexual Activity   • Alcohol use: No   • Drug use: No   • Sexual activity: Defer         Review of Systems:  History obtained from chart review and the patient  General ROS: No fever or chills  Respiratory ROS: No cough, shortness of breath, wheezing  Cardiovascular ROS: No chest pain or palpitations  Gastrointestinal ROS: positive for - diarrhea  No abdominal pain or melena  Genito-Urinary ROS: No dysuria or hematuria  14 point ROS reviewed with the patient and negative except as noted above and in the HPI unless unable to obtain.    Objective:  Patient Vitals for the past 24 hrs:   BP Temp Temp src Pulse Resp SpO2 Height Weight   21 0930 108/53 -- -- 71 -- 96 % -- --   21 0900 116/71 -- -- 68 -- 97 % -- --   21 0830 101/47  "-- -- 65 -- 97 % -- --   06/17/21 0800 98/56 -- -- 67 -- 96 % -- --   06/17/21 0730 133/58 -- -- 67 -- 94 % -- --   06/17/21 0720 (!) 120/103 95.5 °F (35.3 °C) Axillary 72 20 98 % -- --   06/17/21 0700 -- -- -- 70 -- 92 % -- --   06/17/21 0600 108/71 98.4 °F (36.9 °C) Oral 74 14 94 % -- --   06/17/21 0500 120/60 -- -- 71 -- 98 % -- --   06/17/21 0400 114/54 -- -- 69 -- 95 % -- --   06/17/21 0300 99/59 99.5 °F (37.5 °C) Oral 71 14 93 % -- 69.6 kg (153 lb 7 oz)   06/17/21 0201 -- -- -- 75 -- 96 % -- --   06/17/21 0200 92/53 -- -- -- -- -- -- --   06/17/21 0115 112/51 -- -- -- -- -- -- --   06/17/21 0114 -- -- -- 83 -- 95 % -- --   06/16/21 2245 103/52 -- -- 80 -- 94 % -- --   06/16/21 2215 126/59 -- -- 86 -- 96 % -- --   06/16/21 2130 109/51 -- -- 78 -- 95 % -- --   06/16/21 2030 100/44 -- -- 82 -- 95 % -- --   06/16/21 1933 (!) 89/47 -- -- 88 -- 95 % -- --   06/16/21 1845 114/68 -- -- 93 -- 96 % -- --   06/16/21 1806 -- -- -- -- -- 95 % -- --   06/16/21 1748 -- -- -- 90 -- -- -- --   06/16/21 1747 99/48 -- -- -- -- -- -- --   06/16/21 1704 -- 100.2 °F (37.9 °C) Oral -- -- -- -- --   06/16/21 1703 -- -- -- 92 -- 94 % -- --   06/16/21 1700 127/53 -- -- 97 22 93 % 147.3 cm (58\") 65.1 kg (143 lb 8 oz)       Intake/Output Summary (Last 24 hours) at 6/17/2021 1004  Last data filed at 6/16/2021 2043  Gross per 24 hour   Intake 300 ml   Output --   Net 300 ml     General: lethargic  Chest:  clear to auscultation bilaterally without respiratory distress  CVS: regular rate and rhythm  Abdominal: soft, nontender, positive bowel sounds  Extremities: no cyanosis or edema  Skin: warm and dry without rash      Labs:  Results from last 7 days   Lab Units 06/17/21  0609 06/16/21  1706 06/16/21  1245   WBC 10*3/mm3 8.52 6.65 5.45   HEMOGLOBIN g/dL 9.9* 8.5* 8.9*   HEMATOCRIT % 32.3* 27.3* 28.0*   PLATELETS 10*3/mm3 130* 145 156         Results from last 7 days   Lab Units 06/17/21  0609 06/16/21  1706 06/16/21  1245   SODIUM mmol/L " 135* 137 137   POTASSIUM mmol/L 3.8 3.1* 3.1*   CHLORIDE mmol/L 94* 95* 94*   CO2 mmol/L 28.0 28.0 28.0   BUN mg/dL 30* 25* 25*   CREATININE mg/dL 4.26* 4.06* 3.68*   CALCIUM mg/dL 9.2 9.1 9.0   BILIRUBIN mg/dL 0.8 0.6 0.6   ALK PHOS U/L 200* 199* 222*   ALT (SGPT) U/L 18 19 20   AST (SGOT) U/L 29 36* 36*   GLUCOSE mg/dL 97 113* 150*       Radiology:   Imaging Results (Last 72 Hours)     Procedure Component Value Units Date/Time    XR Femur 2 View Right [[id number]] Collected: 06/16/21 1933     Updated: 06/16/21 1937    Narrative:      EXAMINATION: XR FEMUR 2 VW RIGHT-     6/16/2021 6:40 PM CDT     HISTORY: rt leg pain, fall     Right femur, 4 views.     No femur fracture is seen.     The hip and knee are normal, to the extent visualized.     No soft tissue abnormality is seen.       Impression:      1. No acute bony abnormality.              This report was finalized on 06/16/2021 19:34 by Dr. Dharmesh Zuleta MD.    XR Pelvis 1 or 2 View [[id number]] Collected: 06/16/21 1931     Updated: 06/16/21 1936    Narrative:      EXAMINATION: XR PELVIS 1 OR 2 VW-     6/16/2021 6:40 PM CDT     HISTORY: fall, pain     Pelvis, one view.     Mildly displaced superior and inferior right pubic ramus fractures are  not acute and are seen on a CT exam from 6/1/2021.     . SI joints are symmetric.     No sacral fracture is seen.     No hip fracture or dislocation.     Summary:  1. Old fractures of the right superior and inferior pubic ramus.  2. No acute fracture is seen.  This report was finalized on 06/16/2021 19:33 by Dr. Dharmesh Zuleta MD.    XR Hand 3+ View Right [[id number]] Collected: 06/16/21 1930     Updated: 06/16/21 1934    Narrative:      EXAMINATION: XR HAND 3+ VW RIGHT-     6/16/2021 6:40 PM CDT     HISTORY: Fall injury. Right hand pain.     Right hand, 3 views.     Osteopenia.     Chronic appearing small soft tissue calcifications are seen adjacent to  the third and fourth MCP joints.     Metacarpals and fingers show no sign  of acute fracture.     The distal aspect of the second finger and third finger are obscured by  a Sensor and wire on some of the images.     Carpal bones align normally.     Summary:  1. No acute fracture is seen.     This report was finalized on 06/16/2021 19:31 by Dr. Dharmesh Zuleta MD.    XR Wrist 3+ View Right [[id number]] Collected: 06/16/21 1929     Updated: 06/16/21 1932    Narrative:      EXAMINATION: XR WRIST 3+ VW RIGHT-     6/16/2021 6:40 PM CDT     HISTORY: Fall injury. Right wrist pain.     Right wrist, 3 views.     Osteopenia.     Diffuse arterial calcification.     Intact distal radius and ulna.     Carpal bones align normally.     Proximal metacarpals are intact.     Summary:  1. No acute fracture.     This report was finalized on 06/16/2021 19:29 by Dr. Dharmesh Zuleta MD.    XR Chest 1 View [[id number]] Collected: 06/16/21 1927     Updated: 06/16/21 1932    Narrative:      EXAMINATION: XR CHEST 1 VW-     6/16/2021 6:40 PM CDT     HISTORY: fever, AMS     1 view chest x-ray compared with 6/7/2021.     Stable heart and mediastinum.  Aortic arch calcification.     Interval removal of a right jugular central line.     There is increased interstitial prominence compatible with interstitial  edema and early heart failure.     There is an unchanged appearance of a moderate right pleural effusion.     Nonhealed fracture of the right humeral neck.  This finding was demonstrated on a radiograph from 4/1/2021.     Summary:  1. Increased interstitial prominence compatible with interstitial edema  and early failure.  2. Unchanged or minimally decreased moderate right pleural effusion.  This report was finalized on 06/16/2021 19:28 by Dr. Dharmesh Zuleta MD.    CT Cervical Spine Without Contrast [[id number]] Collected: 06/16/21 1827     Updated: 06/16/21 1832    Narrative:      EXAMINATION: CT CERVICAL SPINE WO CONTRAST-      6/16/2021 6:13 PM CDT     HISTORY: Fall injury. Neck pain. Altered mental status.     In order  to have a CT radiation dose as low as reasonably achievable  Automated Exposure Control was utilized for adjustment of the mA and/or  KV according to patient size.     DLP in mGycm= 593.     Noncontrast cervical spine CT.     Comparison is made with cervical spine CT imaging from 3/26/2021.     The patient was unable to cooperate and the images are affected by  motion.     Mild right convex curvature on the coronal sequence is negative and  probably is positional.     The lateral view shows appropriate lordotic curvature with no vertebral  body malalignment.     Prevertebral soft tissues are normal.     Facet joints align normally.     Summary:  1. No acute fracture is seen.                                   This report was finalized on 06/16/2021 18:29 by Dr. Dharmesh Zuleta MD.    CT Head Without Contrast [[id number]] Collected: 06/16/21 1826     Updated: 06/16/21 1830    Narrative:      EXAMINATION: CT HEAD WO CONTRAST-      6/16/2021 6:13 PM CDT     HISTORY: Altered mental status.     In order to have a CT radiation dose as low as reasonably achievable  Automated Exposure Control was utilized for adjustment of the mA and/or  KV according to patient size.     DLP in mGycm= 594.     Noncontrast head CT.     Comparison is made with a noncontrast head CT from 6/1/2021.     Axial, sagittal, and coronal noncontrast CT imaging of the head.     The visualized paranasal sinuses are clear.     The brain and ventricles have an age appropriate appearance.   There is no hemorrhage or mass-effect.   No acute infarction is seen.     No calvarial abnormality.       Impression:      1. No acute intracranial abnormality is seen.  2. Stable chronic change.                                         This report was finalized on 06/16/2021 18:27 by Dr. Dharmesh Zuleta MD.          Culture:  No results found for: BLOODCX, URINECX, WOUNDCX, MRSACX, RESPCX, STOOLCX      Assessment   1.  End stage renal disease on HD TTS  2.  Metabolic  encephalopathy  3.  Sepsis  4.  Type 2 diabetes with hypoglycemia  5.  Hypertension  6.  Anemia of CKD  7.  COPD  7.  Hypokalemia--improved  9.  C Diff colitis    Plan:  1.  Dialysis is due today  2.  Monitor labs  3.  Further assessment and plan pending Dr Gonzalez's evaluation of patient      Thank you for the consult, we appreciate the opportunity to provide care to your patients.  Feel free to contact me if I can be of any further assistance.      ANTHONY Reid  6/17/2021  10:04 CDT    Electronically signed by Adi Gonzalez MD at 06/17/21 9921     Krissy Carballo APRN at 06/18/21 1006              [facility]  INPATIENT WOUND CONSULTATION    Today's Date: 06/18/21    Patient Name: Mini Gentile  MRN: [id number]  CSN: [id number]  PCP: Bharati Dahl APRN  Referring Provider: Bin Guy DO  Attending Provider: Bin Guy DO  Length of Stay: 2    SUBJECTIVE   Chief Complaint: Wound of coccyx/buttock    HPI: Mini Gentile, a 61 y.o.female, presents with a past medical hisotry of diabetes, end stage renal disease on dialysis, tobacco abuse, and hyper lipidemia.  Full past medical history is listed below.  Patient resides at [place] where she was transferred from due to mental status change.  Patient was found laying face down in her bed.  She was noted to have diarrhea and hypotension concerning for sepsis.  She was found to have C. Diff.      Inpatient wound care is consulted due to a wound of the coccyx and buttocks area.  On assessment, all areas appear to be blanching at this time.  There are no active pressure injuries at this time.  There are signs of healed pressure injuries with scarring and blanchable skin.  The patient is at high risk for skin breakdown due to moisture and mobility and activity levels.      Visit Dx:    ICD-10-CM ICD-9-CM   1. Altered mental status, unspecified altered mental status type  R41.82 780.97   2. Hypoxia  R09.02  799.02   3. Elevated troponin  R77.8 790.6   4. End stage renal disease (CMS/[facility])  N18.6 585.6   5. Anemia, unspecified type  D64.9 285.9   6. Hypotension, unspecified hypotension type  I95.9 458.9   7. Fall, initial encounter  W19.XXXA E888.9   8. 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   • Eustachian tube dysfunction   • Chronic otitis media   • Pneumonia of left upper lobe due to Streptococcus (CMS/[facility])   • End stage renal failure on dialysis (CMS/[facility])   • Diabetes (CMS/[facility])   • Glaucoma   • HTN (hypertension)   • Proteinuria   • DM2 (diabetes mellitus, type 2) (CMS/[facility])   • Chronic anemia   • Acute pulmonary edema (CMS/[facility])   • Non-compliance with renal dialysis (CMS/[facility])   • Elevated troponin due to ESRD    • Hyperkalemia   • Encephalopathy, metabolic, due to uremia   • High anion gap metabolic acidosis due to uremia   • Respiratory distress   • Acute diastolic heart failure (CMS/[facility])   • Lymph node enlargement, left axillary measuring 1.1 cm -follow-up for primary care   • Nausea vomiting and diarrhea   • Acute diastolic heart failure (CMS/[facility])   • Diabetes mellitus with ophthalmic complication (CMS/[facility])   • Tobacco abuse   • Urinary tract infection with hematuria   • Pneumonia due to infectious organism   • Abnormal urine findings   • Closed fracture of right proximal humerus   • Hyponatremia   • Hypothyroidism (acquired)   • Forehead laceration secondary to fall   • Heme positive stool   • Dialysis AV fistula malfunction, initial encounter (CMS/[facility])   • Aspiration into airway   • Bronchospasm, acute   • Altered mental status   • Protein-calorie malnutrition (CMS/[facility])   • Pupil irregular   • Sepsis (CMS/[facility])   • Hypoglycemia   • Hyponatremia   • Diarrhea   • Hypokalemia   • C. difficile diarrhea   • Bacteremia due to Pseudomonas       History:   Past Medical History:   Diagnosis Date   • 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    • Chronic otitis media    • Diabetes (CMS/[facility])    • End stage renal disease on dialysis (CMS/[facility])    • Eustachian tube dysfunction    • GERD (gastroesophageal reflux disease)    • Glaucoma    • HTN (hypertension)    • Hyperlipidemia    • Mucoid otitis media    • Noncompliance of patient with renal dialysis (CMS/[facility])    • Tobacco abuse      Past Surgical History:   Procedure Laterality Date   • ARTERIOVENOUS FISTULA     • CATARACT EXTRACTION WITH INTRAOCULAR LENS IMPLANT     •  SECTION     • CHOLECYSTECTOMY     • MYRINGOTOMY W/ TUBES Bilateral    • MYRINGOTOMY W/ TUBES Right    • TUBAL ABDOMINAL LIGATION       Social History     Socioeconomic History   • Marital status: Single     Spouse name: Not on file   • Number of children: Not on file   • Years of education: Not on file   • Highest education level: Not on file   Tobacco Use   • Smoking status: Current Every Day Smoker     Packs/day: 2.00     Years: 6.00     Pack years: 12.00     Types: Cigarettes   • Smokeless tobacco: Never Used   Vaping Use   • Vaping Use: Never used   Substance and Sexual Activity   • Alcohol use: No   • Drug use: No   • Sexual activity: Defer     Family History   Problem Relation Age of Onset   • Heart disease Mother    • Diabetes Father    • Colon cancer Neg Hx    • Colon polyps Neg Hx        Allergies:  Allergies   Allergen Reactions   • Bupropion Nausea Only   • Chantix [Varenicline] Other (See Comments)     Does not remember   • Gabapentin Hallucinations     hallucinations   • Azithromycin Rash   • Levofloxacin Rash   • Penicillins Rash   • Sulfa Antibiotics Rash       Medications:    Current Facility-Administered Medications:   •  acetaminophen (TYLENOL) tablet 650 mg, 650 mg, Oral, Q4H PRN, 650 mg at 21 **OR** acetaminophen (TYLENOL) suppository 650 mg, 650 mg, Rectal, Q4H PRN, Tejinder Kent MD  •  albumin human 25 % IV SOLN 12.5 g, 12.5 g, Intravenous, PRN, Adi Gonzalez  MD [place]  •  aspirin EC tablet 81 mg, 81 mg, Oral, Daily, Tejinder Kent MD, 81 mg at 06/18/21 0921  •  atropine 1 % ophthalmic solution 1 drop, 1 drop, Left Eye, Daily, Tejinder Kent MD, 1 drop at 06/18/21 0922  •  brimonidine (ALPHAGAN) 0.2 % ophthalmic solution 1 drop, 1 drop, Left Eye, BID PRN, Tejinder Kent MD, 1 drop at 06/18/21 0923  •  [START ON 6/19/2021] cefepime (MAXIPIME) 500 mg in sodium chloride 0.9 % 100 mL IVPB, 500 mg, Intravenous, Q24H, Bin Guy, DO  •  cholecalciferol (VITAMIN D3) tablet 2,000 Units, 2,000 Units, Oral, Daily, Tejinder Kent MD, 2,000 Units at 06/18/21 0921  •  dextrose (D50W) 25 g/ 50mL Intravenous Solution 25 g, 25 g, Intravenous, Q15 Min PRN, Tejinder Kent MD  •  dextrose (GLUTOSE) oral gel 15 g, 15 g, Oral, Q15 Min PRN, Tejinder Kent MD  •  epoetin vika-epbx (RETACRIT) injection 10,000 Units, 10,000 Units, Subcutaneous, Once per day on Tue Thu Sat, Tejinder Kent MD, 10,000 Units at 06/17/21 1120  •  famotidine (PEPCID) tablet 20 mg, 20 mg, Oral, Daily, Tejinder Kent MD, 20 mg at 06/18/21 0921  •  Ferric Citrate tablet 840 mg, 4 tablet, Oral, TID With Meals, Tejinder Kent MD, 840 mg at 06/18/21 0920  •  fluticasone (FLONASE) 50 MCG/ACT nasal spray 2 spray, 2 spray, Nasal, BID, Tejinder Kent MD, 2 spray at 06/18/21 0922  •  glucagon (human recombinant) (GLUCAGEN DIAGNOSTIC) injection 1 mg, 1 mg, Subcutaneous, Q15 Min PRN, Tejinder Kent MD  •  insulin lispro (humaLOG) injection 2-7 Units, 2-7 Units, Subcutaneous, TID AC, Tejinder Kent MD  •  ipratropium-albuterol (DUO-NEB) nebulizer solution 3 mL, 3 mL, Nebulization, Q4H PRN, Tejinder Kent MD  •  lactobacillus acidophilus (RISAQUAD) capsule 1 capsule, 1 capsule, Oral, Daily, Tejinder Kent MD, 1 capsule at 06/18/21 0921  •  ondansetron (ZOFRAN) tablet 4 mg, 4 mg, Oral, Q6H PRN **OR** ondansetron (ZOFRAN) injection 4 mg, 4 mg, Intravenous, Q6H PRN, Donte  Tejinder WILHELM MD  •  pravastatin (PRAVACHOL) tablet 10 mg, 10 mg, Oral, Nightly, Tejinder Kent MD, 10 mg at 06/17/21 2032  •  sertraline (ZOLOFT) tablet 25 mg, 25 mg, Oral, Daily, Tejinder Kent MD, 25 mg at 06/18/21 0921  •  sodium chloride 0.9 % bolus 100 mL, 100 mL, Intravenous, PRN, Adi Gonzalez MD  •  [COMPLETED] Insert peripheral IV, , , Once **AND** sodium chloride 0.9 % flush 10 mL, 10 mL, Intravenous, PRN, Tejinder Kent MD  •  sodium chloride 0.9 % flush 10 mL, 10 mL, Intravenous, Q12H, Tejinder Kent MD, 10 mL at 06/17/21 2033  •  sodium chloride 0.9 % flush 10 mL, 10 mL, Intravenous, PRN, Tejinder Kent MD  •  timolol (TIMOPTIC) 0.5 % ophthalmic solution 1 drop, 1 drop, Left Eye, Daily, Tejinder Kent MD, 1 drop at 06/18/21 0923  •  vancomycin oral solution reconstituted 125 mg, 125 mg, Oral, Q6H, Bin Guy DO, 125 mg at 06/18/21 0537    Review of Systems:  Review of Systems   Constitutional: Negative for chills and fever.   HENT: Negative for rhinorrhea and sore throat.    Respiratory: Negative for cough and shortness of breath.    Cardiovascular: Negative for chest pain and palpitations.   Gastrointestinal: Positive for diarrhea. Negative for nausea and vomiting.   Genitourinary: Negative for frequency and urgency.   Musculoskeletal: Positive for gait problem and myalgias. Negative for arthralgias.   Skin: Positive for color change. Negative for wound.   Neurological: Positive for weakness. Negative for dizziness.   Psychiatric/Behavioral: Negative for agitation and behavioral problems.         OBJECTIVE     Vitals:    06/18/21 0900   BP: 135/89   Pulse: 86   Resp:    Temp:    SpO2: 95%       PHYSICAL EXAM  Assessment was completed in conjunction of physical therapy getting patient out of bed to move to chair.   Physical Exam  Vitals and nursing note reviewed.   Constitutional:       General: She is awake.   HENT:      Head: Normocephalic and atraumatic.    Eyes:      General: Lids are normal. Gaze aligned appropriately.   Cardiovascular:      Rate and Rhythm: Normal rate and regular rhythm.   Pulmonary:      Effort: Pulmonary effort is normal. No respiratory distress.   Abdominal:      General: Abdomen is flat.      Palpations: Abdomen is soft.   Musculoskeletal:      Cervical back: Normal range of motion and neck supple.   Feet:      Right foot:      Skin integrity: No ulcer or skin breakdown.      Left foot:      Skin integrity: No ulcer or skin breakdown.   Skin:     General: Skin is warm and dry.      Findings: Erythema present.      Comments: All areas are blanchable.  Slight blanchable erythema present on bony prominences.  Scarring and blanchable erythema of areas of bilateral buttocks and sacrococcygeal region.    Neurological:      Mental Status: She is alert and oriented to person, place, and time.   Psychiatric:         Attention and Perception: Attention normal.         Mood and Affect: Mood normal.         Speech: Speech normal.         Behavior: Behavior is cooperative.           Picture taken by nursing staff:   Darkened areas appear to be scarring from previous pressure injuries.  All areas are blanching now.            Results Review:  Lab Results (last 48 hours)     Procedure Component Value Units Date/Time    POC Glucose Once [[id number]]  (Normal) Collected: 06/18/21 0632    Specimen: Blood Updated: 06/18/21 0643     Glucose 113 mg/dL      Comment: : [id number] Blake DanielMeter ID: [id number]       Manual Differential [[id number]]  (Abnormal) Collected: 06/18/21 0250    Specimen: Blood Updated: 06/18/21 0329     Neutrophil % 90.0 %      Lymphocyte % 4.0 %      Monocyte % 5.0 %      Eosinophil % 1.0 %      Neutrophils Absolute 7.22 10*3/mm3      Lymphocytes Absolute 0.32 10*3/mm3      Monocytes Absolute 0.40 10*3/mm3      Eosinophils Absolute 0.08 10*3/mm3      Hypochromia Slight/1+     Macrocytes Slight/1+     WBC Morphology Normal      Platelet Estimate Decreased    CBC & Differential [[id number]]  (Abnormal) Collected: 06/18/21 0250    Specimen: Blood Updated: 06/18/21 0329    Narrative:      The following orders were created for panel order CBC & Differential.  Procedure                               Abnormality         Status                     ---------                               -----------         ------                     CBC Auto Differential[[id number]]        Abnormal            Final result                 Please view results for these tests on the individual orders.    CBC Auto Differential [[id number]]  (Abnormal) Collected: 06/18/21 0250    Specimen: Blood Updated: 06/18/21 0329     WBC 8.02 10*3/mm3      RBC 2.91 10*6/mm3      Hemoglobin 8.9 g/dL      Hematocrit 29.4 %      .0 fL      MCH 30.6 pg      MCHC 30.3 g/dL      RDW 17.2 %      RDW-SD 63.9 fl      MPV 9.7 fL      Platelets 126 10*3/mm3      nRBC 0.4 /100 WBC     Basic Metabolic Panel [[id number]]  (Abnormal) Collected: 06/18/21 0250    Specimen: Blood Updated: 06/18/21 0316     Glucose 123 mg/dL      BUN 16 mg/dL      Creatinine 2.40 mg/dL      Sodium 138 mmol/L      Potassium 3.8 mmol/L      Comment: Slight hemolysis detected by analyzer. Results may be affected.        Chloride 98 mmol/L      CO2 29.0 mmol/L      Calcium 9.0 mg/dL      eGFR Non African Amer 20 mL/min/1.73      BUN/Creatinine Ratio 6.7     Anion Gap 11.0 mmol/L     Narrative:      GFR Normal >60  Chronic Kidney Disease <60  Kidney Failure <15      Magnesium [[id number]]  (Normal) Collected: 06/18/21 0250    Specimen: Blood Updated: 06/18/21 0316     Magnesium 1.9 mg/dL     POC Glucose Once [[id number]]  (Normal) Collected: 06/18/21 0122    Specimen: Blood Updated: 06/18/21 0133     Glucose 116 mg/dL      Comment: : [id number] Blake ToneyJarett ID: [id number]       POC Glucose Once [[id number]]  (Normal) Collected: 06/17/21 1626    Specimen: Blood Updated: 06/17/21 1638     Glucose 92 mg/dL       Comment: : [id number] Thalia SheltonFreenom ID: [id number]       Blood Culture - Blood, Arm, Right [[id number]]  (Abnormal) Collected: 06/16/21 1749    Specimen: Blood from Arm, Right Updated: 06/17/21 1503     Blood Culture Abnormal Stain     Gram Stain Aerobic Bottle Gram negative bacilli      Anaerobic Bottle Gram negative bacilli    Blood Culture ID, PCR - Blood, Arm, Right [[id number]]  (Abnormal) Collected: 06/16/21 1740    Specimen: Blood from Arm, Right Updated: 06/17/21 1340     BCID, PCR Pseudomonas aeruginosa. Identification by BCID PCR.     BOTTLE TYPE Anaerobic Bottle    Blood Culture - Blood, Arm, Right [[id number]]  (Abnormal) Collected: 06/16/21 1740    Specimen: Blood from Arm, Right Updated: 06/17/21 1208     Blood Culture Abnormal Stain     Gram Stain Anaerobic Bottle Gram negative bacilli    Urine Culture - Urine, Urine, Catheter [[id number]] Collected: 06/16/21 1730    Specimen: Urine, Catheter Updated: 06/17/21 1142     Urine Culture >100,000 CFU/mL Mixed Jacquie Isolated    Narrative:      Specimen contains mixed organisms of questionable pathogenicity which indicates contamination with commensal jacquie.  Further identification is unlikely to provide clinically useful information.  Suggest recollection.    POC Glucose Once [[id number]]  (Normal) Collected: 06/17/21 1118    Specimen: Blood Updated: 06/17/21 1129     Glucose 96 mg/dL      Comment: : [id number] Thalia FuentesbereniceFreenom ID: [id number]       Troponin [[id number]]  (Abnormal) Collected: 06/17/21 1043    Specimen: Blood Updated: 06/17/21 1116     Troponin T 0.103 ng/mL     Narrative:      Troponin T Reference Range:  <= 0.03 ng/mL-   Negative for AMI  >0.03 ng/mL-     Abnormal for myocardial necrosis.  Clinicians would have to utilize clinical acumen, EKG, Troponin and serial changes to determine if it is an Acute Myocardial Infarction or myocardial injury due to an underlying chronic condition.       Results may be falsely decreased if  patient taking Biotin.      Lactic Acid, Plasma [[id number]]  (Normal) Collected: 06/17/21 1043    Specimen: Blood Updated: 06/17/21 1108     Lactate 1.6 mmol/L     Manual Differential [[id number]]  (Abnormal) Collected: 06/17/21 0609    Specimen: Blood Updated: 06/17/21 0713     Neutrophil % 75.0 %      Lymphocyte % 3.0 %      Monocyte % 3.0 %      Bands %  12.0 %      Metamyelocyte % 5.0 %      Myelocyte % 2.0 %      Neutrophils Absolute 7.41 10*3/mm3      Lymphocytes Absolute 0.26 10*3/mm3      Monocytes Absolute 0.26 10*3/mm3      nRBC 1.0 /100 WBC      Anisocytosis Slight/1+     Polychromasia Large/3+     WBC Morphology Normal     Platelet Estimate Decreased    CBC Auto Differential [[id number]]  (Abnormal) Collected: 06/17/21 0609    Specimen: Blood Updated: 06/17/21 0713     WBC 8.52 10*3/mm3      RBC 3.22 10*6/mm3      Hemoglobin 9.9 g/dL      Hematocrit 32.3 %      .3 fL      MCH 30.7 pg      MCHC 30.7 g/dL      RDW 17.5 %      RDW-SD 65.0 fl      MPV 9.6 fL      Platelets 130 10*3/mm3     Narrative:      The previously reported component NRBC is no longer being reported. Previous result was 0.2 /100 WBC (Reference Range: 0.0-0.2 /100 WBC) on 6/17/2021 at 0637 CDT.    POC Glucose Once [[id number]]  (Normal) Collected: 06/17/21 0641    Specimen: Blood Updated: 06/17/21 0653     Glucose 96 mg/dL      Comment: : [id number] Blake Otto ID: [id number]       Comprehensive Metabolic Panel [[id number]]  (Abnormal) Collected: 06/17/21 0609    Specimen: Blood Updated: 06/17/21 0651     Glucose 97 mg/dL      BUN 30 mg/dL      Creatinine 4.26 mg/dL      Sodium 135 mmol/L      Potassium 3.8 mmol/L      Chloride 94 mmol/L      CO2 28.0 mmol/L      Calcium 9.2 mg/dL      Total Protein 6.1 g/dL      Albumin 3.40 g/dL      ALT (SGPT) 18 U/L      AST (SGOT) 29 U/L      Alkaline Phosphatase 200 U/L      Total Bilirubin 0.8 mg/dL      eGFR Non African Amer 11 mL/min/1.73      Comment: <15 Indicative of kidney  failure.        eGFR   Amer --     Comment: <15 Indicative of kidney failure.        Globulin 2.7 gm/dL      A/G Ratio 1.3 g/dL      BUN/Creatinine Ratio 7.0     Anion Gap 13.0 mmol/L     Narrative:      GFR Normal >60  Chronic Kidney Disease <60  Kidney Failure <15      Phosphorus [[id number]]  (Normal) Collected: 06/17/21 0609    Specimen: Blood Updated: 06/17/21 0651     Phosphorus 3.9 mg/dL     Magnesium [[id number]]  (Normal) Collected: 06/17/21 0609    Specimen: Blood Updated: 06/17/21 0646     Magnesium 1.9 mg/dL     TSH [[id number]]  (Normal) Collected: 06/16/21 1706    Specimen: Blood Updated: 06/17/21 0444     TSH 4.190 uIU/mL     T4, Free [[id number]]  (Normal) Collected: 06/16/21 1706    Specimen: Blood Updated: 06/17/21 0444     Free T4 0.99 ng/dL     Narrative:      Results may be falsely increased if patient taking Biotin.      Gastrointestinal Panel, PCR - Stool, Per Rectum [[id number]]  (Normal) Collected: 06/17/21 0212    Specimen: Stool from Per Rectum Updated: 06/17/21 0342     Campylobacter Not Detected     Plesiomonas shigelloides Not Detected     Salmonella Not Detected     Vibrio Not Detected     Vibrio cholerae Not Detected     Yersinia enterocolitica Not Detected     Enteroaggregative E. coli (EAEC) Not Detected     Enteropathogenic E. coli (EPEC) Not Detected     Enterotoxigenic E. coli (ETEC) lt/st Not Detected     Shiga-like toxin-producing E. coli (STEC) stx1/stx2 Not Detected     Shigella/Enteroinvasive E. coli (EIEC) Not Detected     Cryptosporidium Not Detected     Cyclospora cayetanensis Not Detected     Entamoeba histolytica Not Detected     Giardia lamblia Not Detected     Adenovirus F40/41 Not Detected     Astrovirus Not Detected     Norovirus GI/GII Not Detected     Rotavirus A Not Detected     Sapovirus (I, II, IV or V) Not Detected    Narrative:      If Aeromonas, Staphylococcus aureus or Bacillus cereus are suspected, please order [id number]: Stool Culture, Aeromonas, S  aureus, B Cereus.    Clostridium Difficile Toxin - Stool, Per Rectum [[id number]]  (Abnormal) Collected: 06/17/21 0212    Specimen: Stool from Per Rectum Updated: 06/17/21 0322    Narrative:      The following orders were created for panel order Clostridium Difficile Toxin - Stool, Per Rectum.  Procedure                               Abnormality         Status                     ---------                               -----------         ------                     Clostridium Difficile To...[[id number]]  Abnormal            Final result                 Please view results for these tests on the individual orders.    Clostridium Difficile Toxin, PCR - Stool, Per Rectum [[id number]]  (Abnormal) Collected: 06/17/21 0212    Specimen: Stool from Per Rectum Updated: 06/17/21 0322     C. Difficile Toxins by PCR Positive    Narrative:      Performance characteristics of test not established for patients <2 years of age.    Timed Lactic Acid, Reflex [[id number]]  (Abnormal) Collected: 06/16/21 2050    Specimen: Blood Updated: 06/16/21 2119     Lactate 2.3 mmol/L     COVID-19,[facility]-HOU[facility],Nasal Swab No Transport Media 3-4 HR TAT - Swab, Nasal Cavity [[id number]]  (Normal) Collected: 06/16/21 1813    Specimen: Swab from Nasal Cavity Updated: 06/16/21 1912     COVID19 Not Detected    Narrative:      Fact sheet for providers: https://www.fda.gov/media/316575/download     Fact sheet for patients: https://www.fda.gov/media/915681/download    Test performed by PCR.    Consider negative results in combination with clinical observations, patient history, and epidemiological information.    Manual Differential [[id number]]  (Abnormal) Collected: 06/16/21 1706    Specimen: Blood Updated: 06/16/21 1839     Neutrophil % 87.0 %      Lymphocyte % 4.0 %      Monocyte % 3.0 %      Bands %  6.0 %      Neutrophils Absolute 6.18 10*3/mm3      Lymphocytes Absolute 0.27 10*3/mm3      Monocytes Absolute 0.20 10*3/mm3      nRBC 2.0 /100 WBC       Anisocytosis Slight/1+     Macrocytes Slight/1+     Polychromasia Slight/1+     WBC Morphology Normal     Platelet Estimate Adequate    CBC & Differential [[id number]]  (Abnormal) Collected: 06/16/21 1706    Specimen: Blood Updated: 06/16/21 1839    Narrative:      The following orders were created for panel order CBC & Differential.  Procedure                               Abnormality         Status                     ---------                               -----------         ------                     CBC Auto Differential[[id number]]        Abnormal            Final result                 Please view results for these tests on the individual orders.    CBC Auto Differential [[id number]]  (Abnormal) Collected: 06/16/21 1706    Specimen: Blood Updated: 06/16/21 1839     WBC 6.65 10*3/mm3      RBC 2.71 10*6/mm3      Hemoglobin 8.5 g/dL      Hematocrit 27.3 %      .7 fL      MCH 31.4 pg      MCHC 31.1 g/dL      RDW 17.7 %      RDW-SD 64.7 fl      MPV 9.5 fL      Platelets 145 10*3/mm3      nRBC 1.1 /100 WBC     Lactic Acid, Plasma [[id number]]  (Abnormal) Collected: 06/16/21 1749    Specimen: Blood Updated: 06/16/21 1818     Lactate 2.8 mmol/L     [place] Draw [[id number]] Collected: 06/16/21 1706    Specimen: Blood Updated: 06/16/21 1817    Narrative:      The following orders were created for panel order [place] Draw.  Procedure                               Abnormality         Status                     ---------                               -----------         ------                     Green Top (Gel)[[id number]]                                  Final result               Lavender Top[[id number]]                                     Final result               Red Top[[id number]]                                          Final result                 Please view results for these tests on the individual orders.    Green Top (Gel) [[id number]] Collected: 06/16/21 1706    Specimen: Blood Updated: 06/16/21 1817      Extra Tube Hold for add-ons.     Comment: Auto resulted.       Lavender Top [[id number]] Collected: 06/16/21 1706    Specimen: Blood Updated: 06/16/21 1817     Extra Tube hold for add-on     Comment: Auto resulted       Red Top [[id number]] Collected: 06/16/21 1706    Specimen: Blood Updated: 06/16/21 1817     Extra Tube Hold for add-ons.     Comment: Auto resulted.       Troponin [[id number]]  (Abnormal) Collected: 06/16/21 1706    Specimen: Blood Updated: 06/16/21 1751     Troponin T 0.136 ng/mL     Narrative:      Troponin T Reference Range:  <= 0.03 ng/mL-   Negative for AMI  >0.03 ng/mL-     Abnormal for myocardial necrosis.  Clinicians would have to utilize clinical acumen, EKG, Troponin and serial changes to determine if it is an Acute Myocardial Infarction or myocardial injury due to an underlying chronic condition.       Results may be falsely decreased if patient taking Biotin.      Protime-INR [[id number]]  (Abnormal) Collected: 06/16/21 1725    Specimen: Blood Updated: 06/16/21 1749     Protime 18.9 Seconds      INR 1.72    aPTT [[id number]]  (Abnormal) Collected: 06/16/21 1725    Specimen: Blood Updated: 06/16/21 1749     PTT 44.0 seconds     Procalcitonin [[id number]]  (Abnormal) Collected: 06/16/21 1706    Specimen: Blood Updated: 06/16/21 1745     Procalcitonin 12.46 ng/mL     Narrative:      As a Marker for Sepsis (Non-Neonates):     1. <0.5 ng/mL represents a low risk of severe sepsis and/or septic shock.  2. >2 ng/mL represents a high risk of severe sepsis and/or septic shock.    As a Marker for Lower Respiratory Tract Infections that require antibiotic therapy:  PCT on Admission     Antibiotic Therapy             6-12 Hrs later  >0.5                          Strongly Recommended            >0.25 - <0.5             Recommended  0.1 - 0.25                  Discouraged                       Remeasure/reassess PCT  <0.1                         Strongly Discouraged         Remeasure/reassess PCT      As 28  "day mortality risk marker: \"Change in Procalcitonin Result\" (>80% or <=80%) if Day 0 (or Day 1) and Day 4 values are available. Refer to http://www.[facility] [employer]ct-calculator.com/    Change in PCT <=80 %   A decrease of PCT levels below or equal to 80% defines a positive change in PCT test result representing a higher risk for 28-day all-cause mortality of patients diagnosed with severe sepsis or septic shock.    Change in PCT >80 %   A decrease of PCT levels of more than 80% defines a negative change in PCT result representing a lower risk for 28-day all-cause mortality of patients diagnosed with severe sepsis or septic shock.                Comprehensive Metabolic Panel [[id number]]  (Abnormal) Collected: 06/16/21 1706    Specimen: Blood Updated: 06/16/21 1741     Glucose 113 mg/dL      BUN 25 mg/dL      Creatinine 4.06 mg/dL      Sodium 137 mmol/L      Potassium 3.1 mmol/L      Chloride 95 mmol/L      CO2 28.0 mmol/L      Calcium 9.1 mg/dL      Total Protein 5.9 g/dL      Albumin 3.50 g/dL      ALT (SGPT) 19 U/L      AST (SGOT) 36 U/L      Alkaline Phosphatase 199 U/L      Total Bilirubin 0.6 mg/dL      eGFR Non African Amer 11 mL/min/1.73      Comment: <15 Indicative of kidney failure.        eGFR   Amer --     Comment: <15 Indicative of kidney failure.        Globulin 2.4 gm/dL      A/G Ratio 1.5 g/dL      BUN/Creatinine Ratio 6.2     Anion Gap 14.0 mmol/L     Narrative:      GFR Normal >60  Chronic Kidney Disease <60  Kidney Failure <15      CK [[id number]]  (Normal) Collected: 06/16/21 1706    Specimen: Blood Updated: 06/16/21 1740     Creatine Kinase 37 U/L     Salicylate Level [[id number]]  (Normal) Collected: 06/16/21 1706    Specimen: Blood Updated: 06/16/21 1739     Salicylate <0.3 mg/dL     Acetaminophen Level [[id number]]  (Normal) Collected: 06/16/21 1706    Specimen: Blood Updated: 06/16/21 1739     Acetaminophen <5.0 mcg/mL     Phosphorus [[id number]]  (Normal) Collected: 06/16/21 1706    Specimen: " Blood Updated: 06/16/21 1738     Phosphorus 2.5 mg/dL     Ethanol [[id number]] Collected: 06/16/21 1706    Specimen: Blood Updated: 06/16/21 1736     Ethanol % <0.010 %     Narrative:      Not for legal purposes. Chain of Custody not followed.     Magnesium [[id number]]  (Normal) Collected: 06/16/21 1706    Specimen: Blood Updated: 06/16/21 1735     Magnesium 1.9 mg/dL     Blood Gas, Arterial - [[id number]]  (Abnormal) Collected: 06/16/21 1723    Specimen: Arterial Blood Updated: 06/16/21 1723     Site Right Radial     Rustam's Test Positive     pH, Arterial 7.506 pH units      Comment: 83 Value above reference range        pCO2, Arterial 35.8 mm Hg      pO2, Arterial 54.0 mm Hg      Comment: 85 Value below critical limit        HCO3, Arterial 28.3 mmol/L      Comment: 83 Value above reference range        Base Excess, Arterial 5.0 mmol/L      Comment: 83 Value above reference range        O2 Saturation, Arterial 91.5 %      Comment: 84 Value below reference range        Temperature 37.0 C      Barometric Pressure for Blood Gas 750 mmHg      Modality Room Air     Ventilator Mode NA     Notified [facility] HOLLIE HANNAH     Notified By [id number]     Notified Time 06/16/2021 17:28     Collected by [id number]     Comment: Meter: [id number]     :  [id number]        pCO2, Temperature Corrected 35.8 mm Hg      pH, Temp Corrected 7.506 pH Units      pO2, Temperature Corrected 54.0 mm Hg         Imaging Results (Last 72 Hours)     Procedure Component Value Units Date/Time    XR Femur 2 View Right [[id number]] Collected: 06/16/21 1933     Updated: 06/16/21 1937    Narrative:      EXAMINATION: XR FEMUR 2 VW RIGHT-     6/16/2021 6:40 PM CDT     HISTORY: rt leg pain, fall     Right femur, 4 views.     No femur fracture is seen.     The hip and knee are normal, to the extent visualized.     No soft tissue abnormality is seen.       Impression:      1. No acute bony abnormality.              This report was finalized on  06/16/2021 19:34 by Dr. Dharmesh Zuleta MD.    XR Pelvis 1 or 2 View [[id number]] Collected: 06/16/21 1931     Updated: 06/16/21 1936    Narrative:      EXAMINATION: XR PELVIS 1 OR 2 VW-     6/16/2021 6:40 PM CDT     HISTORY: fall, pain     Pelvis, one view.     Mildly displaced superior and inferior right pubic ramus fractures are  not acute and are seen on a CT exam from 6/1/2021.     . SI joints are symmetric.     No sacral fracture is seen.     No hip fracture or dislocation.     Summary:  1. Old fractures of the right superior and inferior pubic ramus.  2. No acute fracture is seen.  This report was finalized on 06/16/2021 19:33 by Dr. Dharmesh Zuleta MD.    XR Hand 3+ View Right [[id number]] Collected: 06/16/21 1930     Updated: 06/16/21 1934    Narrative:      EXAMINATION: XR HAND 3+ VW RIGHT-     6/16/2021 6:40 PM CDT     HISTORY: Fall injury. Right hand pain.     Right hand, 3 views.     Osteopenia.     Chronic appearing small soft tissue calcifications are seen adjacent to  the third and fourth MCP joints.     Metacarpals and fingers show no sign of acute fracture.     The distal aspect of the second finger and third finger are obscured by  a Sensor and wire on some of the images.     Carpal bones align normally.     Summary:  1. No acute fracture is seen.     This report was finalized on 06/16/2021 19:31 by Dr. Dharmesh Zuleta MD.    XR Wrist 3+ View Right [[id number]] Collected: 06/16/21 1929     Updated: 06/16/21 1932    Narrative:      EXAMINATION: XR WRIST 3+ VW RIGHT-     6/16/2021 6:40 PM CDT     HISTORY: Fall injury. Right wrist pain.     Right wrist, 3 views.     Osteopenia.     Diffuse arterial calcification.     Intact distal radius and ulna.     Carpal bones align normally.     Proximal metacarpals are intact.     Summary:  1. No acute fracture.     This report was finalized on 06/16/2021 19:29 by Dr. Dharmesh Zuleta MD.    XR Chest 1 View [[id number]] Collected: 06/16/21 1927     Updated: 06/16/21 1932     Narrative:      EXAMINATION: XR CHEST 1 VW-     6/16/2021 6:40 PM CDT     HISTORY: fever, AMS     1 view chest x-ray compared with 6/7/2021.     Stable heart and mediastinum.  Aortic arch calcification.     Interval removal of a right jugular central line.     There is increased interstitial prominence compatible with interstitial  edema and early heart failure.     There is an unchanged appearance of a moderate right pleural effusion.     Nonhealed fracture of the right humeral neck.  This finding was demonstrated on a radiograph from 4/1/2021.     Summary:  1. Increased interstitial prominence compatible with interstitial edema  and early failure.  2. Unchanged or minimally decreased moderate right pleural effusion.  This report was finalized on 06/16/2021 19:28 by Dr. Dharmesh Zuleta MD.    CT Cervical Spine Without Contrast [[id number]] Collected: 06/16/21 1827     Updated: 06/16/21 1832    Narrative:      EXAMINATION: CT CERVICAL SPINE WO CONTRAST-      6/16/2021 6:13 PM CDT     HISTORY: Fall injury. Neck pain. Altered mental status.     In order to have a CT radiation dose as low as reasonably achievable  Automated Exposure Control was utilized for adjustment of the mA and/or  KV according to patient size.     DLP in mGycm= 593.     Noncontrast cervical spine CT.     Comparison is made with cervical spine CT imaging from 3/26/2021.     The patient was unable to cooperate and the images are affected by  motion.     Mild right convex curvature on the coronal sequence is negative and  probably is positional.     The lateral view shows appropriate lordotic curvature with no vertebral  body malalignment.     Prevertebral soft tissues are normal.     Facet joints align normally.     Summary:  1. No acute fracture is seen.                                   This report was finalized on 06/16/2021 18:29 by Dr. Dharmesh Zuleta MD.    CT Head Without Contrast [[id number]] Collected: 06/16/21 1826     Updated: 06/16/21 1830     Narrative:      EXAMINATION: CT HEAD WO CONTRAST-      6/16/2021 6:13 PM CDT     HISTORY: Altered mental status.     In order to have a CT radiation dose as low as reasonably achievable  Automated Exposure Control was utilized for adjustment of the mA and/or  KV according to patient size.     DLP in mGycm= 594.     Noncontrast head CT.     Comparison is made with a noncontrast head CT from 6/1/2021.     Axial, sagittal, and coronal noncontrast CT imaging of the head.     The visualized paranasal sinuses are clear.     The brain and ventricles have an age appropriate appearance.   There is no hemorrhage or mass-effect.   No acute infarction is seen.     No calvarial abnormality.       Impression:      1. No acute intracranial abnormality is seen.  2. Stable chronic change.                                         This report was finalized on 06/16/2021 18:27 by Dr. Dharmesh Zuleta MD.             ASSESSMENT/PLAN       Examination and evaluation of wound(s) was performed.    DIAGNOSIS:   At risk for skin breakdown  History of pressure injuries  Bowel and Bladder Incontinence   Impaired mobility and ADLs  Diabetes mellitus, type 2  C. Difficile diarrhea      PLAN:     Start     Ordered    06/18/21 1154  Elevate Heels Off of Bed  Until Discontinued      06/18/21 1153    06/18/21 1154  Turn Patient  Now Then Every 2 Hours      06/18/21 1153    06/18/21 1154  Use Repositioning Wedge to Position Patient  Continuous      06/18/21 1153    06/18/21 1154  Use Seat Cushion When Up In Chair  Continuous      06/18/21 1153    Unscheduled  Apply Moisture Barrier After Any Incontinence  As Needed     Comments: Hydraguard   Question:  Wound Care Instructions  Answer:  Apply Moisture Barrier After Any Incontinence    06/18/21 1153               Discussed findings and treatment plan including risks, benefits, and treatment options with patient in detail. Patient agreed with treatment plan.      This document has been electronically signed  by ANTHONY Saab on 6/18/2021 10:06 CDT    Electronically signed by Krissy Carballo APRN at 06/21/21 0637     Fabio Glover MD at 06/18/21 1715      Consult Orders    1. Inpatient Infectious Diseases Consult [[id number]] ordered by iBn Guy DO at 06/18/21 0940               INFECTIOUS DISEASES CONSULT NOTE    Patient:  Mini Gentile 61 y.o. female  ROOM # 371/1  YOB: 1960  MRN: [id number]  CSN:  [id number]  Admit date: 6/16/2021   Admitting Physician: Bin Guy DO  Primary Care Physician: Bharati Dahl APRN  REFERRING PROVIDER: Tejinder Kent MD    Reason for Consultation: C. difficile, Pseudomonas bacteremia, multiple allergies    History of Present Illness/Chief Complaint: Pleasant 61-year-old woman.  Little bit difficult to get clear history from the patient.  Asked what brought her to the hospital.  She indicates she was at a nursing home.  She indicates she had fallen.  She really was not sure that she had.  My review the admit H&P she had been found lying face down in her bed.  She was confused.  She was brought to the hospital.  She had been experiencing some diarrhea.  Per review of the admit H&P she was uncomfortable and moaning in bed.  She underwent extensive evaluation in the emergency room.  Multiple images did not show anything acute.  She had had some hypotension.  She received antibiotic treatment a few weeks ago for pneumonia.  Additional testing revealed she was stool C. difficile positive.  She also is now growing gram-negative anson of the blood.  She has been improving with her initial therapy with oral vancomycin and intravenous cefepime.  Infectious disease asked to evaluate and offer recommendations given the combination of C. difficile and bacteremia.    Current Scheduled Medications:   aspirin, 81 mg, Oral, Daily  atropine, 1 drop, Left Eye, Daily  cefepime, 500 mg, Intravenous, Q24H  cholecalciferol, 2,000 Units, Oral, Daily  epoetin  "vika-epbx, 10,000 Units, Subcutaneous, Once per day on  Sat  famotidine, 20 mg, Oral, Once per day on  Sat  Ferric Citrate, 4 tablet, Oral, TID With Meals  fluticasone, 2 spray, Nasal, BID  insulin lispro, 2-7 Units, Subcutaneous, TID AC  lactobacillus acidophilus, 1 capsule, Oral, Daily  pravastatin, 10 mg, Oral, Nightly  sertraline, 25 mg, Oral, Daily  sodium chloride, 10 mL, Intravenous, Q12H  timolol, 1 drop, Left Eye, Daily  vancomycin, 125 mg, Oral, Q6H      Current PRN Medications:  •  acetaminophen **OR** acetaminophen  •  brimonidine  •  dextrose  •  dextrose  •  glucagon (human recombinant)  •  ipratropium-albuterol  •  ondansetron **OR** ondansetron  •  sodium chloride  •  [COMPLETED] Insert peripheral IV **AND** sodium chloride  •  sodium chloride    Allergies:    Allergies   Allergen Reactions   • Bupropion Nausea Only   • Chantix [Varenicline] Other (See Comments)     Does not remember   • Gabapentin Hallucinations     hallucinations   • Azithromycin Rash   • Levofloxacin Rash   • Penicillins Rash   • Sulfa Antibiotics Rash     Past Medical History: Diabetes mellitus.  End-stage renal disease.  She undergoes hemodialysis.  Hypertension.  History of mucoid otitis media.  She has some compliance issues with dialysis.  Tobacco use.  Hyperlipidemia.    Past Surgical History: Left arm fistula.   section.  Cholecystectomy.  Myringotomy tube placement.  Tubal ligation.    Social History: She smokes tobacco.  No alcohol use.  No illicit drug use.    Family History: Diabetes mellitus.  Heart disease.    Exposure History: No close contacts have been ill.    Review of Systems see HPI    Vital Signs:  /62 (BP Location: Right leg, Patient Position: Lying)   Pulse 76   Temp 98.5 °F (36.9 °C)   Resp 16   Ht 147.3 cm (58\")   Wt 67.8 kg (149 lb 7.6 oz)   SpO2 94%   BMI 31.24 kg/m²  Temp (24hrs), Av.7 °F (37.1 °C), Min:98.3 °F (36.8 °C), Max:99.1 °F (37.3 °C)    Physical " Exam  Vital signs - reviewed.  Line/IV site - No erythema or tenderness.  Lungs are clear without crackles or wheezes  Heart distant heart sounds without apparent murmur  Abdomen with normal bowel sounds.  No significant distention.  Minimal tenderness.  No rebound.  Extremities trace edema  Neurological examination did not appear to show any focal deficits or lateralizing findings.  She was alert able to answer questions.  At times seem to have a little bit of confusion as to sequence of events, was following commands and answering questions appropriately.    Lab Results:  CBC:   Results from last 7 days   Lab Units 06/19/21  0550 06/18/21  0250 06/17/21  0609 06/16/21  1706 06/16/21  1245   WBC 10*3/mm3 7.47 8.02 8.52 6.65 5.45   HEMOGLOBIN g/dL 9.2* 8.9* 9.9* 8.5* 8.9*   HEMATOCRIT % 30.1* 29.4* 32.3* 27.3* 28.0*   PLATELETS 10*3/mm3 121* 126* 130* 145 156   LYMPHOCYTE % % 5.0* 4.0* 3.0* 4.0* 3.0*   MONOCYTES % % 6.0 5.0 3.0* 3.0* 4.0*     CMP:   Results from last 7 days   Lab Units 06/19/21  0550 06/18/21  0250 06/17/21  0609 06/16/21 1706 06/16/21  1245   SODIUM mmol/L 135* 138 135* 137 137   POTASSIUM mmol/L 3.5 3.8 3.8 3.1* 3.1*   CHLORIDE mmol/L 95* 98 94* 95* 94*   CO2 mmol/L 27.0 29.0 28.0 28.0 28.0   BUN mg/dL 30* 16 30* 25* 25*   CREATININE mg/dL 3.21* 2.40* 4.26* 4.06* 3.68*   CALCIUM mg/dL 9.5 9.0 9.2 9.1 9.0   BILIRUBIN mg/dL  --   --  0.8 0.6 0.6   ALK PHOS U/L  --   --  200* 199* 222*   ALT (SGPT) U/L  --   --  18 19 20   AST (SGOT) U/L  --   --  29 36* 36*   GLUCOSE mg/dL 124* 123* 97 113* 150*     Blood cultures June 16, 2021:  Susceptibility     Pseudomonas aeruginosa     VICKEY (Preliminary)     Cefepime 32 ug/ml Resistant     Ceftazidime >=64 ug/ml Resistant     Ciprofloxacin <=0.25 ug/ml Susceptible     Levofloxacin 0.5 ug/ml Susceptible      Radiology:  Chest x-ray June 16, 2021:  Summary:  1. Increased interstitial prominence compatible with interstitial edema  and early failure.  2. Unchanged  or minimally decreased moderate right pleural effusion.  This report was finalized on 06/16/2021 19:28 by Dr. Dharmesh Zuleta MD.     CT abdomen and pelvis on June 1, 2021:  IMPRESSION:  1. Anasarca. Otherwise, no acute process involving the abdomen or  pelvis.  This report was finalized on 06/01/2021 19:54 by Dr Jhonatan Berry,     Additional Studies Reviewed:     Impression:   1.  Pseudomonas bacteremia-suspect this was transient via gastrointestinal source.  2.  C. difficile colitis  3.  End-stage renal disease on hemodialysis  4.  Diabetes mellitus  5.  Allergies listed to azithromycin, levofloxacin, penicillins, and sulfa antibiotics-nature of reaction uncertain    Recommendations:    Continue treatment with oral vancomycin  Discontinue cefepime  Begin treatment with meropenem  Will plan 5-day course of meropenem treatment (asking micro to report meropenem susceptibility)-if not susceptible we will asked him to report Avycaz  Suspect bacteremia was transient and do not feel extended course of treatment will be necessary and feel a more truncated antibiotic course would be preferable given her C. difficile  Will continue to follow    Fabio Gomez MD  06/19/21  08:24 CDT            Electronically signed by Fabio Gomez MD at 06/19/21 0836         6/17  B/p 120/103    76/48   75/65   81/43     Pt drowsy and oriented to only person. Frequent complaints of pain on right side of body, particularly right arm. Complaint of back pain intermittently. Blood pressures adequate, around 100-110 systolic. Room air with good sat. Positive for C - diff with multiple bowel movements.      Patient had hemo dialysis completed today with 2200ml removed. Speech therapy cleared for a [facility] soft diet with thin liquids. Has remained confused throughout the day, but more awakening easier   6/16  B/p 89/47     Temp 100.2       From [place]   6/18   Decreasing lethargy. Room air with good sat. NSR 70-90. BPs 100-110 systolic    6/19Neurochecks Q4, pt alert and oriented

## 2021-06-21 NOTE — PLAN OF CARE
Problem: Adult Inpatient Plan of Care  Goal: Plan of Care Review  Outcome: Ongoing, Progressing  Flowsheets (Taken 6/21/2021 1000)  Progress: improving  Plan of Care Reviewed With: patient  Outcome Summary: Pt. performed elbow flex/ext only sec. Rue pain and Nwb restrictions until notified otherwise!   Goal Outcome Evaluation:  Plan of Care Reviewed With: patient        Progress: improving  Outcome Summary: Pt. performed elbow flex/ext only sec. Rue pain and Nwb restrictions until notified otherwise!

## 2021-06-22 NOTE — PROGRESS NOTES
Infectious Diseases Progress Note    Patient:  Mini Gentile  YOB: 1960  MRN: [id number]   Admit date: 6/16/2021   Admitting Physician: Sundeep Aldridge*  Primary Care Physician: Bharati Dahl APRN    Chief Complaint/Interval History: She is feeling much better.  Her  is at bedside.  She indicates she will go to nursing home temporarily.  She is not having diarrhea.  She is tolerating oral intake.  She has had no fevers or chills.  No abdominal pain.    Intake/Output Summary (Last 24 hours) at 6/22/2021 1740  Last data filed at 6/22/2021 1238  Gross per 24 hour   Intake 480 ml   Output 2500 ml   Net -2020 ml     Allergies:   Allergies   Allergen Reactions   • Bupropion Nausea Only   • Chantix [Varenicline] Other (See Comments)     Does not remember   • Gabapentin Hallucinations     hallucinations   • Azithromycin Rash   • Levofloxacin Rash   • Penicillins Rash   • Sulfa Antibiotics Rash     Current Scheduled Medications:   aspirin, 81 mg, Oral, Daily  atropine, 1 drop, Left Eye, Daily  carvedilol, 6.25 mg, Oral, BID With Meals  cholecalciferol, 2,000 Units, Oral, Daily  epoetin vika-epbx, 10,000 Units, Subcutaneous, Once per day on Tue Thu Sat  famotidine, 20 mg, Oral, Once per day on Tue Thu Sat  Ferric Citrate, 4 tablet, Oral, TID With Meals  fluticasone, 2 spray, Nasal, BID  insulin lispro, 2-7 Units, Subcutaneous, TID AC  lactobacillus acidophilus, 1 capsule, Oral, Daily  meropenem, 500 mg, Intravenous, Q24H  pravastatin, 10 mg, Oral, Nightly  sertraline, 25 mg, Oral, Daily  sodium chloride, 10 mL, Intravenous, Q12H  timolol, 1 drop, Left Eye, Daily  vancomycin, 125 mg, Oral, Q6H    Current PRN Medications:  •  acetaminophen **OR** acetaminophen  •  brimonidine  •  dextrose  •  dextrose  •  glucagon (human recombinant)  •  ipratropium-albuterol  •  ondansetron **OR** ondansetron  •  sodium chloride  •  sodium chloride  •  [COMPLETED] Insert peripheral IV **AND** sodium  "chloride  •  sodium chloride    Review of Systems see HPI    Vital Signs:  /64 (BP Location: Right arm, Patient Position: Sitting)   Pulse 76   Temp 97.6 °F (36.4 °C) (Oral)   Resp 16   Ht 147.3 cm (58\")   Wt 64 kg (141 lb)   SpO2 94%   BMI 29.47 kg/m²     Physical Exam  Vital signs - reviewed.  Line/IV site - No erythema, warmth, induration, or tenderness.  Pleasant, smiling, interactive, no distress  Lungs without crackles  Abdomen soft and nontender  No guarding or rebound  No rash or skin itching    Lab Results:  CBC:   Results from last 7 days   Lab Units 06/22/21  0436 06/19/21  0550 06/18/21  0250 06/17/21  0609 06/16/21  1706 06/16/21  1245   WBC 10*3/mm3 5.35 7.47 8.02 8.52 6.65 5.45   HEMOGLOBIN g/dL 10.4* 9.2* 8.9* 9.9* 8.5* 8.9*   HEMATOCRIT % 34.7 30.1* 29.4* 32.3* 27.3* 28.0*   PLATELETS 10*3/mm3 195 121* 126* 130* 145 156     BMP:  Results from last 7 days   Lab Units 06/22/21  0436 06/21/21  0551 06/20/21  0625 06/19/21  0550 06/18/21  0250 06/17/21  0609 06/16/21  1706 06/16/21  1245   SODIUM mmol/L 131* 133* 132* 135* 138 135* 137 137   POTASSIUM mmol/L 3.9 4.0 4.2 3.5 3.8 3.8 3.1* 3.1*   CHLORIDE mmol/L 92* 94* 94* 95* 98 94* 95* 94*   CO2 mmol/L 28.0 28.0 25.0 27.0 29.0 28.0 28.0 28.0   BUN mg/dL 28* 22 16 30* 16 30* 25* 25*   CREATININE mg/dL 4.09* 3.59* 2.43* 3.21* 2.40* 4.26* 4.06* 3.68*   GLUCOSE mg/dL 178* 165* 181* 124* 123* 97 113* 150*   CALCIUM mg/dL 8.9 9.1 9.2 9.5 9.0 9.2 9.1 9.0   ALT (SGPT) U/L  --   --   --   --   --  18 19 20     Culture Results:   Blood Culture   Date Value Ref Range Status   06/19/2021 No growth at 3 days  Preliminary   06/16/2021 Pseudomonas aeruginosa (C)  Final     Comment:     Refer to previous blood culture collected 6/16/2021 at 1840 for VICKEY's.     06/16/2021 Pseudomonas aeruginosa (C)  Final     Urine Culture   Date Value Ref Range Status   06/16/2021 >100,000 CFU/mL Mixed Janeth Isolated  Final     Radiology: None  Additional Studies Reviewed: " None    Impression:   1.  Pseudomonas bacteremia-suspect this was transient via gastrointestinal source.  She has done well with her course of antibiotic treatment.  2.  C. difficile colitis-improving  3.  End-stage renal disease on hemodialysis  4.  Diabetes mellitus  5.  Allergies listed to azithromycin, levofloxacin, penicillins, and sulfa antibiotics-nature of her reactions uncertain    Recommendations:   She seems to be improving  Continue meropenem through June 24, 2021  Okay to discontinue meropenem after June 24 dose  Continue treatment for C. difficile  Would suggest continuing oral vancomycin through June 28, 2021  Okay with me to transition to nursing home with rehab when ready for release per others  Will sign off for now  Would be happy to reassess if new problems or additional questions for infectious diseases  She can follow-up with infectious diseases as needed    Fabio Gomez MD

## 2021-06-22 NOTE — PLAN OF CARE
Problem: Adult Inpatient Plan of Care  Goal: Plan of Care Review  Outcome: Ongoing, Progressing  Flowsheets (Taken 6/22/2021 3576)  Progress: improving  Plan of Care Reviewed With: patient  Outcome Summary: Pt. completed adl showering/dressing with sba-min. assist from this hood/l!   Goal Outcome Evaluation:  Plan of Care Reviewed With: patient        Progress: improving  Outcome Summary: Pt. completed adl showering/dressing with sba-min. assist from this hood/l!

## 2021-06-22 NOTE — PROGRESS NOTES
Nephrology ([facility]) Progress Note      Patient:  Mini Gentile  YOB: 1960  Date of Service: 6/22/2021  MRN: [id number]   Acct: [id number]   Primary Care Physician: Bharati Dahl APRN  Advance Directive:   Code Status and Medical Interventions:   Ordered at: 06/17/21 0220     Code Status:    CPR     Medical Interventions (Level of Support Prior to Arrest):    Full     Admit Date: 6/16/2021       Hospital Day: 6  Referring Provider: Tejinder Kent MD      Patient personally seen and examined.  Complete chart including Consults, Notes, Operative Reports, Labs, Cardiology, and Radiology studies reviewed as able.    Chief complaint: Abnormal labs.    Subjective:  Mini Gentile is a 61 y.o. female  whom we were consulted for end stage renal disease. Maintenance hemodialysis on Tuesday Thursday Saturday at [facility]. Fairly noncompliant with dialysis treatments. She was just discharged from [facility] on 6/8. Unknown if she has been going to HD treatments since then.  Presented to ER after she was noted to be confused at nursing home.  She was diagnosed with sepsis caused by C. difficile colitis.    This afternoon she feels more stronger     Currently seen on hemodialysis  Hemodialysis access: AV fistula  Hemodialysis: 3-1/2-hour  Ultrafiltration: 2500 cc  2K bath  Blood flow rate is 450 cc/min.       Allergies:  Bupropion, Chantix [varenicline], Gabapentin, Azithromycin, Levofloxacin, Penicillins, and Sulfa antibiotics    Home Meds:  Medications Prior to Admission   Medication Sig Dispense Refill Last Dose   • aspirin 81 MG EC tablet Take 81 mg by mouth Daily.   6/16/2021 at Unknown time   • atropine 1 % ophthalmic solution Administer 1 drop into the left eye Daily.   6/16/2021 at Unknown time   • brimonidine (ALPHAGAN) 0.2 % ophthalmic solution Administer 1 drop into the left eye 2 (two) times a day.   6/16/2021 at Unknown time   • carvedilol (COREG)  6.25 MG tablet Take 6.25 mg by mouth 2 (Two) Times a Day With Meals.   6/16/2021 at Unknown time   • cholecalciferol (VITAMIN D3) 1000 units tablet Take 2,000 Units by mouth Daily.   6/16/2021 at Unknown time   • dorzolamide (TRUSOPT) 2 % ophthalmic solution Administer 1 drop into the left eye 2 (Two) Times a Day.   6/16/2021 at Unknown time   • epoetin vika-epbx (RETACRIT) 73117 UNIT/ML injection Inject 1 mL under the skin into the appropriate area as directed 3 (Three) Times a Week. Indications: ESRD on Dialysis 6.6 mL  6/16/2021 at Unknown time   • famotidine (PEPCID) 20 MG tablet Take 1 tablet by mouth Daily.   6/16/2021 at Unknown time   • Ferric Citrate 1  MG(Fe) tablet Take 1 tablet by mouth 3 (Three) Times a Day With Meals.   6/16/2021 at Unknown time   • fluticasone (FLONASE) 50 MCG/ACT nasal spray 2 sprays into the nostril(s) as directed by provider 2 (Two) Times a Day.   6/16/2021 at Unknown time   • insulin lispro (humaLOG) 100 UNIT/ML injection Inject under the skin TID AC as per sliding scale:  200-249= 2 units  250-299= 4 units  300-349= 6 units  350-399= 8 units  400-500= 10 units and call MD   6/16/2021 at Unknown time   • metoclopramide (REGLAN) 5 MG tablet Take 1 tablet by mouth 4 (Four) Times a Day Before Meals & at Bedtime.   6/16/2021 at Unknown time   • rosuvastatin (CRESTOR) 5 MG tablet Take 5 mg by mouth every night at bedtime.   6/15/2021 at Unknown time   • saccharomyces boulardii (FLORASTOR) 250 MG capsule Take 250 mg by mouth Daily.   6/16/2021 at Unknown time   • sertraline (ZOLOFT) 25 MG tablet Take 1 tablet by mouth Daily.   6/16/2021 at Unknown time   • timolol (TIMOPTIC) 0.5 % ophthalmic solution Administer 1 drop into the left eye Every Morning.  12 6/16/2021 at Unknown time   • acetaminophen (TYLENOL) 325 MG tablet Take 650 mg by mouth Every 4 (Four) Hours As Needed for Mild Pain  or Fever.   Unknown at Unknown time   • albuterol sulfate  (90 Base) MCG/ACT inhaler  Inhale 2 puffs Every 4 (Four) Hours As Needed for Wheezing. 18 g 0 Unknown at Unknown time   • ipratropium-albuterol (DUO-NEB) 0.5-2.5 mg/3 ml nebulizer Take 3 mL by nebulization Every 4 (Four) Hours As Needed for Wheezing or Shortness of Air.   Unknown at Unknown time   • ondansetron (ZOFRAN) 4 MG tablet Take 1 tablet by mouth Every 6 (Six) Hours As Needed for Nausea or Vomiting. 24 tablet 2 Unknown at Unknown time       Medicines:  Current Facility-Administered Medications   Medication Dose Route Frequency Provider Last Rate Last Admin   • acetaminophen (TYLENOL) tablet 650 mg  650 mg Oral Q4H PRN Bin Guy DO   650 mg at 06/22/21 0050    Or   • acetaminophen (TYLENOL) suppository 650 mg  650 mg Rectal Q4H PRN Bin Guy DO       • aspirin EC tablet 81 mg  81 mg Oral Daily Bin Guy DO   81 mg at 06/22/21 0823   • atropine 1 % ophthalmic solution 1 drop  1 drop Left Eye Daily Bin Guy DO   1 drop at 06/22/21 0821   • brimonidine (ALPHAGAN) 0.2 % ophthalmic solution 1 drop  1 drop Left Eye BID PRN Bin Guy DO   1 drop at 06/21/21 2153   • carvedilol (COREG) tablet 6.25 mg  6.25 mg Oral BID With Meals Bin Guy DO   6.25 mg at 06/22/21 0823   • cholecalciferol (VITAMIN D3) tablet 2,000 Units  2,000 Units Oral Daily Bin Guy DO   2,000 Units at 06/22/21 0823   • dextrose (D50W) 25 g/ 50mL Intravenous Solution 25 g  25 g Intravenous Q15 Min PRN Bin Guy DO       • dextrose (GLUTOSE) oral gel 15 g  15 g Oral Q15 Min PRN Bin Guy DO       • epoetin vika-epbx (RETACRIT) injection 10,000 Units  10,000 Units Subcutaneous Once per day on Tue Thu Sat Bin Guy DO   10,000 Units at 06/22/21 0840   • famotidine (PEPCID) tablet 20 mg  20 mg Oral Once per day on Tue Thu Sat Bin Guy DO   20 mg at 06/19/21 1729   • Ferric Citrate tablet 840 mg  4 tablet Oral TID With Meals Bin Guy DO   840 mg at 06/22/21 0823   • fluticasone  (FLONASE) 50 MCG/ACT nasal spray 2 spray  2 spray Nasal BID Bin Guy DO   2 spray at 06/22/21 0825   • glucagon (human recombinant) (GLUCAGEN DIAGNOSTIC) injection 1 mg  1 mg Subcutaneous Q15 Min PRN Bin Guy DO       • insulin lispro (humaLOG) injection 2-7 Units  2-7 Units Subcutaneous TID AC Bin Guy DO   2 Units at 06/22/21 0840   • ipratropium-albuterol (DUO-NEB) nebulizer solution 3 mL  3 mL Nebulization Q4H PRN Bin Guy DO       • lactobacillus acidophilus (RISAQUAD) capsule 1 capsule  1 capsule Oral Daily Bin Guy DO   1 capsule at 06/22/21 0823   • meropenem (MERREM) 1 g/100 mL 0.9% NS VTB (mbp)  1 g Intravenous Once Fabio Glover MD       • meropenem (MERREM) 500mg/100 mL 0.9% NS IVPB (mbp)  500 mg Intravenous Q24H Fabio Glover MD   500 mg at 06/21/21 1358   • ondansetron (ZOFRAN) tablet 4 mg  4 mg Oral Q6H PRN Bin Guy DO        Or   • ondansetron (ZOFRAN) injection 4 mg  4 mg Intravenous Q6H PRN Bin Guy DO       • pravastatin (PRAVACHOL) tablet 10 mg  10 mg Oral Nightly Bin Guy DO   10 mg at 06/21/21 2149   • sertraline (ZOLOFT) tablet 25 mg  25 mg Oral Daily Bin Guy DO   25 mg at 06/22/21 0823   • sodium chloride 0.9 % bolus 100 mL  100 mL Intravenous PRN Bin Guy DO       • sodium chloride 0.9 % bolus 100 mL  100 mL Intravenous PRN Adi Gonzalez MD       • sodium chloride 0.9 % flush 10 mL  10 mL Intravenous PRN Bin Guy DO       • sodium chloride 0.9 % flush 10 mL  10 mL Intravenous Q12H Bin Guy DO   10 mL at 06/22/21 0826   • sodium chloride 0.9 % flush 10 mL  10 mL Intravenous PRN Bin Guy,        • timolol (TIMOPTIC) 0.5 % ophthalmic solution 1 drop  1 drop Left Eye Daily Bin Guy DO   1 drop at 06/21/21 0814   • vancomycin oral solution reconstituted 125 mg  125 mg Oral Q6H Bin Guy DO   125 mg at 06/22/21 0556       Past Medical  History:  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    • Chronic otitis media    • Diabetes (CMS/HCC)    • End stage renal disease on dialysis (CMS/[facility])    • Eustachian tube dysfunction    • GERD (gastroesophageal reflux disease)    • Glaucoma    • HTN (hypertension)    • Hyperlipidemia    • Mucoid otitis media    • Noncompliance of patient with renal dialysis (CMS/[facility])    • Tobacco abuse        Past Surgical History:  Past Surgical History:   Procedure Laterality Date   • ARTERIOVENOUS FISTULA     • CATARACT EXTRACTION WITH INTRAOCULAR LENS IMPLANT     •  SECTION     • CHOLECYSTECTOMY     • MYRINGOTOMY W/ TUBES Bilateral    • MYRINGOTOMY W/ TUBES Right    • TUBAL ABDOMINAL LIGATION         Family History  Family History   Problem Relation Age of Onset   • Heart disease Mother    • Diabetes Father    • Colon cancer Neg Hx    • Colon polyps Neg Hx        Social History  Social History     Socioeconomic History   • Marital status: Single     Spouse name: Not on file   • Number of children: Not on file   • Years of education: Not on file   • Highest education level: Not on file   Tobacco Use   • Smoking status: Current Every Day Smoker     Packs/day: 2.00     Years: 6.00     Pack years: 12.00     Types: Cigarettes   • Smokeless tobacco: Never Used   Vaping Use   • Vaping Use: Never used   Substance and Sexual Activity   • Alcohol use: No   • Drug use: No   • Sexual activity: Defer         Review of Systems:  History obtained from chart review and the patient  General ROS: No fever or chills  Respiratory ROS: No cough, shortness of breath, wheezing  Cardiovascular ROS: No chest pain or palpitations  Gastrointestinal ROS: No abdominal pain or melena  Genito-Urinary ROS: No dysuria or hematuria    Objective:  Patient Vitals for the past 24 hrs:   BP Temp Temp src Pulse Resp SpO2 Weight   21 0906 145/70 97.8 °F (36.6 °C) Oral 60 18 -- --   21 0700 164/66  -- Oral 74 14 94 % --   06/22/21 0432 -- -- -- -- -- -- 64 kg (141 lb)   06/22/21 0325 136/61 97.7 °F (36.5 °C) Oral 75 16 -- --   06/22/21 0020 136/64 97.4 °F (36.3 °C) Oral 74 16 -- --   06/21/21 1843 147/62 96.8 °F (36 °C) Oral 72 14 97 % --   06/21/21 1500 125/53 98.5 °F (36.9 °C) Oral 74 14 94 % --       Intake/Output Summary (Last 24 hours) at 6/22/2021 1130  Last data filed at 6/22/2021 0928  Gross per 24 hour   Intake 480 ml   Output --   Net 480 ml     General: awake/alert   HEENT: Normocephalic atraumatic head.  Neck: Supple with no JVD or carotid bruits.  Chest:  clear to auscultation bilaterally without respiratory distress  CVS: regular rate and rhythm  Abdominal: soft, nontender, positive bowel sounds  Extremities: no cyanosis or edema  Skin: warm and dry without rash      Labs:  Results from last 7 days   Lab Units 06/22/21  0436 06/19/21  0550 06/18/21  0250   WBC 10*3/mm3 5.35 7.47 8.02   HEMOGLOBIN g/dL 10.4* 9.2* 8.9*   HEMATOCRIT % 34.7 30.1* 29.4*   PLATELETS 10*3/mm3 195 121* 126*         Results from last 7 days   Lab Units 06/22/21  0436 06/21/21  0551 06/20/21  0625 06/17/21  0609 06/16/21  1706 06/16/21  1245   SODIUM mmol/L 131* 133* 132* 135* 137 137   POTASSIUM mmol/L 3.9 4.0 4.2 3.8 3.1* 3.1*   CHLORIDE mmol/L 92* 94* 94* 94* 95* 94*   CO2 mmol/L 28.0 28.0 25.0 28.0 28.0 28.0   BUN mg/dL 28* 22 16 30* 25* 25*   CREATININE mg/dL 4.09* 3.59* 2.43* 4.26* 4.06* 3.68*   CALCIUM mg/dL 8.9 9.1 9.2 9.2 9.1 9.0   BILIRUBIN mg/dL  --   --   --  0.8 0.6 0.6   ALK PHOS U/L  --   --   --  200* 199* 222*   ALT (SGPT) U/L  --   --   --  18 19 20   AST (SGOT) U/L  --   --   --  29 36* 36*   GLUCOSE mg/dL 178* 165* 181* 97 113* 150*       Radiology:   Imaging Results (Last 72 Hours)     ** No results found for the last 72 hours. **          Culture:  Blood Culture   Date Value Ref Range Status   06/19/2021 No growth at less than 24 hours  Preliminary   06/16/2021 Pseudomonas aeruginosa (C)  Preliminary      Comment:     Refer to previous blood culture collected 6/16/2021 at 1840 for VICKEY's.     06/16/2021 Pseudomonas aeruginosa (C)  Preliminary     Urine Culture   Date Value Ref Range Status   06/16/2021 >100,000 CFU/mL Mixed Janeth Isolated  Final         Assessment   1.  End-stage renal disease currently seen on hemodialysis  2.  Type II diabetic nephropathy.  3.  C. difficile colitis.  4.  Chronic obstructive pulmonary disease/active tobacco use.  5.  Anemia of chronic kidney disease.  6.  Noncompliant.  7.  Metabolic encephalopathy improved.  8.  Hyponatremia.       Plan:  1.  Tolerating very well.  2.  P.o. vancomycin.  3.  Continue ELVIS.        Jose Caballero MD  6/22/2021  11:30 CDT

## 2021-06-22 NOTE — PLAN OF CARE
VSS, to dialysis today - tolerated well, ABX as ordered, denies pain, incontinent stool several times this shift, room air, safety maintained    Problem: Adult Inpatient Plan of Care  Goal: Plan of Care Review  Outcome: Ongoing, Progressing  Flowsheets  Taken 6/22/2021 1535 by Mini Rosado, RN  Progress: no change  Taken 6/22/2021 0725 by Jacky Zhang, SEFERINO/L  Plan of Care Reviewed With: patient   Goal Outcome Evaluation:           Progress: no change

## 2021-06-22 NOTE — PROGRESS NOTES
1           [facility] Medicine Services  INPATIENT PROGRESS NOTE    Patient Name: Mini Gentile  Date of Admission: 6/16/2021  Today's Date: 06/22/21  Length of Stay: 6  Primary Care Physician: Bharati Dahl APRN    Subjective   Chief Complaint: Follow-up  HPI   She was readmitted on June 16 with altered mental status..  Patient reportedly found laying face down in her bed with confusion. She is known to me from a prior hospitalization where she presented with altered mental status.  She is an end-stage renal disease patient on dialysis every Tuesday Thursday and Saturday.     She was seen in consultation by wound care nurse, Gomez Lee with nephrology.  Patient been noted to have C. difficile and Pseudomonas bacteremia     Blood culture as of June 19 (compared to June 16) is clear of Pseudomonas bacteremia.  She is currently on Merrem.  She was just started on Merrem on June 20 at 1228-hour.  She initially received cefepime on June 16 and June 18 she is also on oral vancomycin (started on June 17) for C. difficile infection.    She states she did not have any bowel movement yesterday.  She only had a small small bowel movement today.  She denies any abdominal pain, nausea or vomiting  She expressed anticipation of going home soon instead of a skilled nursing facility where she came from.    I spoke to her  Sundeep who is willing to take her back.  He was hoping that she could be a little bit stronger.  I gave her the feedback received from therapist.  Review of Systems     All pertinent negatives and positives are as above. All other systems have been reviewed and are negative unless otherwise stated.     Objective    Temp:  [96.8 °F (36 °C)-98.5 °F (36.9 °C)] 97.7 °F (36.5 °C)  Heart Rate:  [72-76] 74  Resp:  [14-16] 14  BP: (125-164)/(53-66) 164/66  Physical Exam   I saw her in the dialysis unit.  Pleasant woman  No distress  Alert and oriented x3  Anicteric  sclera  Normocephalic and atraumatic head  No thyromegaly  Lungs are clear to auscultation; good air exchange  S1-S2, regular rate and rhythm; murmur present  Soft abdomen, nontender, no guarding, no gross organomegaly  No cyanosis or gross edema  Warm dry skin with good capillary refill time  Has a bruise on left elbow  No significant change from yesterday's exam    Results Review:  I have reviewed the labs, radiology results, and diagnostic studies.    Laboratory Data:   Results from last 7 days   Lab Units 06/22/21  0436 06/19/21  0550 06/18/21  0250   WBC 10*3/mm3 5.35 7.47 8.02   HEMOGLOBIN g/dL 10.4* 9.2* 8.9*   HEMATOCRIT % 34.7 30.1* 29.4*   PLATELETS 10*3/mm3 195 121* 126*        Results from last 7 days   Lab Units 06/22/21  0436 06/21/21  0551 06/20/21  0625 06/17/21  0609 06/16/21  1706 06/16/21  1245   SODIUM mmol/L 131* 133* 132* 135* 137 137   POTASSIUM mmol/L 3.9 4.0 4.2 3.8 3.1* 3.1*   CHLORIDE mmol/L 92* 94* 94* 94* 95* 94*   CO2 mmol/L 28.0 28.0 25.0 28.0 28.0 28.0   BUN mg/dL 28* 22 16 30* 25* 25*   CREATININE mg/dL 4.09* 3.59* 2.43* 4.26* 4.06* 3.68*   CALCIUM mg/dL 8.9 9.1 9.2 9.2 9.1 9.0   BILIRUBIN mg/dL  --   --   --  0.8 0.6 0.6   ALK PHOS U/L  --   --   --  200* 199* 222*   ALT (SGPT) U/L  --   --   --  18 19 20   AST (SGOT) U/L  --   --   --  29 36* 36*   GLUCOSE mg/dL 178* 165* 181* 97 113* 150*       Culture Data:   Blood Culture   Date Value Ref Range Status   06/19/2021 No growth at 3 days  Preliminary   06/16/2021 Pseudomonas aeruginosa (C)  Final     Comment:     Refer to previous blood culture collected 6/16/2021 at 1840 for VICKEY's.     06/16/2021 Pseudomonas aeruginosa (C)  Final     Urine Culture   Date Value Ref Range Status   06/16/2021 >100,000 CFU/mL Mixed Janeth Isolated  Final       Radiology Data:   Imaging Results (Last 24 Hours)     ** No results found for the last 24 hours. **          I have reviewed the patient's current medications.     Assessment/Plan     Active  Hospital Problems    Diagnosis    • **C. difficile diarrhea    • Bacteremia due to Pseudomonas    • Diarrhea    • Hypokalemia    • Altered mental status    • DM2 (diabetes mellitus, type 2) (CMS/[facility])    • End stage renal failure on dialysis (CMS/[facility])    • Diabetes (CMS/[facility])    • HTN (hypertension)        Problem list  C. difficile associated diarrhea in the setting of recent antibiotic use for aspiration pneumonia.  Started on vancomycin p.o. on   Pseudomonas bacteremia -started on Merrem on  with plan of 5-day course.  She was on cefepime prior to this.  Suspected that this is transient via GI source per ID.  ID suspects that this was transient and does not feel extended course of treatment will be necessary particularly given her C. difficile.  Altered mental status felt metabolic encephalopathies in the setting of infection and dialysis.  She is back to her baseline  Hypotension resolved  Hypokalemia  End-stage renal disease on dialysis   Diabetes mellitus type 2 -     Surveillance blood culture on  showed no growth to date  Continue PT/OT.  Patient been ambulating 40 feet contact-guard assist with minimum assistance.  I tried to get in touch with family by the name Kina Bowling.  I got the correct number although the person and the other end of the phone said no one lives there by that name.  discussed with pharmacist (Song): dose 3 as of today. Expected completion is on   Initially treated with Cefepime to which pseudomonas was resistant to it.   Currently on d6 of PO vanco which expected to  on .   Will check with ID if needing an extended dose of PO Vanc from time of finishing Merrem. Will check for how many days.    apprised of plan of care.    aspirin, 81 mg, Oral, Daily  atropine, 1 drop, Left Eye, Daily  carvedilol, 6.25 mg, Oral, BID With Meals  cholecalciferol, 2,000 Units, Oral, Daily  epoetin vika-epbx, 10,000 Units, Subcutaneous, Once per day on Tue  Thu Sat  famotidine, 20 mg, Oral, Once per day on Tue Thu Sat  Ferric Citrate, 4 tablet, Oral, TID With Meals  fluticasone, 2 spray, Nasal, BID  insulin lispro, 2-7 Units, Subcutaneous, TID AC  lactobacillus acidophilus, 1 capsule, Oral, Daily  meropenem, 1 g, Intravenous, Once  meropenem, 500 mg, Intravenous, Q24H  pravastatin, 10 mg, Oral, Nightly  sertraline, 25 mg, Oral, Daily  sodium chloride, 10 mL, Intravenous, Q12H  timolol, 1 drop, Left Eye, Daily  vancomycin, 125 mg, Oral, Q6H          Discharge Planning: Anticipated she will be completing her IV Merrem on June 24.  If no other compelling indication for hospitalization at that time, anticipate that she will be discharged.    Electronically signed by Sundeep Aldridge MD, 06/22/21, 09:40 CDT.

## 2021-06-22 NOTE — THERAPY TREATMENT NOTE
Acute Care - Occupational Therapy Treatment Note  [facility]     Patient Name: Mini Gentile  : 1960  MRN: [id number]  Today's Date: 2021             Admit Date: 2021       ICD-10-CM ICD-9-CM   1. Altered mental status, unspecified altered mental status type  R41.82 780.97   2. Hypoxia  R09.02 799.02   3. Elevated troponin  R77.8 790.6   4. End stage renal disease (CMS/HCC)  N18.6 585.6   5. Anemia, unspecified type  D64.9 285.9   6. Hypotension, unspecified hypotension type  I95.9 458.9   7. Fall, initial encounter  W19.XXXA E888.9   8. Dysphagia, unspecified type  R13.10 787.20   9. Decreased activities of daily living (ADL)  Z78.9 V49.89   10. 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   • Eustachian tube dysfunction   • Chronic otitis media   • Pneumonia of left upper lobe due to Streptococcus (CMS/[facility])   • End stage renal failure on dialysis (CMS/[facility])   • Diabetes (CMS/[facility])   • Glaucoma   • HTN (hypertension)   • Proteinuria   • DM2 (diabetes mellitus, type 2) (CMS/[facility])   • Chronic anemia   • Acute pulmonary edema (CMS/[facility])   • Non-compliance with renal dialysis (CMS/[facility])   • Elevated troponin due to ESRD    • Hyperkalemia   • Encephalopathy, metabolic, due to uremia   • High anion gap metabolic acidosis due to uremia   • Respiratory distress   • Acute diastolic heart failure (CMS/[facility])   • Lymph node enlargement, left axillary measuring 1.1 cm -follow-up for primary care   • Nausea vomiting and diarrhea   • Acute diastolic heart failure (CMS/HCC)   • Diabetes mellitus with ophthalmic complication (CMS/[facility])   • Tobacco abuse   • Urinary tract infection with hematuria   • Pneumonia due to infectious organism   • Abnormal urine findings   • Closed fracture of right proximal humerus   • Hyponatremia   • Hypothyroidism (acquired)   • Forehead laceration secondary to fall   • Heme positive stool   • Dialysis  AV fistula malfunction, initial encounter (CMS/[facility])   • Aspiration into airway   • Bronchospasm, acute   • Altered mental status   • Protein-calorie malnutrition (CMS/[facility])   • Pupil irregular   • Sepsis (CMS/[facility])   • Hypoglycemia   • Hyponatremia   • Diarrhea   • Hypokalemia   • C. 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    • Chronic otitis media    • Diabetes (CMS/[facility])    • End stage renal disease on dialysis (CMS/[facility])    • Eustachian tube dysfunction    • GERD (gastroesophageal reflux disease)    • Glaucoma    • HTN (hypertension)    • Hyperlipidemia    • Mucoid otitis media    • Noncompliance of patient with renal dialysis (CMS/[facility])    • Tobacco abuse      Past Surgical History:   Procedure Laterality Date   • ARTERIOVENOUS FISTULA     • CATARACT EXTRACTION WITH INTRAOCULAR LENS IMPLANT     •  SECTION     • CHOLECYSTECTOMY     • MYRINGOTOMY W/ TUBES Bilateral    • MYRINGOTOMY W/ TUBES Right    • TUBAL ABDOMINAL LIGATION              OT ASSESSMENT FLOWSHEET (last 12 hours)      OT Evaluation and Treatment     Row Name 21 0705                   OT Time and Intention    Subjective Information  complains of;weakness;fatigue;pain  -CJ        Document Type  therapy note (daily note)  -CJ        Mode of Treatment  occupational therapy  -CJ        Patient Effort  good  -CJ           General Information    Existing Precautions/Restrictions  fall;non-weight bearing NWB RUE!  -CJ        Limitations/Impairments  visual  -CJ           Pain Assessment    Additional Documentation  Pain Scale: Word Pre/Post-Treatment (Group)  -           Pain Scale: Numbers Pre/Post-Treatment    Pain Intervention(s)  Repositioned;Rest;Shower  -CJ           Pain Scale: Word Pre/Post-Treatment    Pain: Word Scale, Pretreatment  2 - mild pain  -        Posttreatment Pain Rating  2 - mild pain  -        Pain Location - Side   Right  -CJ        Pain Location - Orientation  upper  -        Pain Location  shoulder  -CJ           Bed Mobility    Bed Mobility  bed mobility (all) activities;supine-sit;sit-supine  -CJ        Supine-Sit [place] (Bed Mobility)  verbal cues;minimum assist (75% patient effort)  -CJ        Sit-Supine [place] (Bed Mobility)  standby assist;supervision  -           Functional Mobility    Functional Mobility- Ind. Level  set up required;verbal cues required;contact guard assist  -        Functional Mobility- Device  -- HHA!  -        Functional Mobility-Distance (Feet)  6  -CJ           Transfers    Bed-Chair [place] (Transfers)  set up;verbal cues;contact guard  -CJ        Sit-Stand [place] (Transfers)  -- HHA!  -           Activities of Daily Living    BADL Assessment/Intervention  bathing;upper body dressing;lower body dressing;grooming  -           Bathing Assessment/Intervention    [place] Level (Bathing)  bathing skills;set up;standby assist;supervision  -        Assistive Devices (Bathing)  bath mitt;grab bar, tub/shower;hand-held shower spray hose;shower commode chair, rolling  -        Position (Bathing)  supported sitting;unsupported standing  -           Upper Body Dressing Assessment/Training    [place] Level (Upper Body Dressing)  doff;don;front opening garment;set up;verbal cues;contact guard assist  -        Position (Upper Body Dressing)  unsupported sitting;unsupported standing  -CJ           Lower Body Dressing Assessment/Training    [place] Level (Lower Body Dressing)  doff;don;pants/bottoms;socks;set up;verbal cues;minimum assist (75% patient effort)  -        Position (Lower Body Dressing)  unsupported sitting;unsupported standing  -           Wound 04/04/21 0142 coccyx    Wound - Properties Group Placement Date: 04/04/21  -VT Placement Time: 0142 -VT Location: coccyx  -VT    Retired Wound - Properties Group Date first assessed: 04/04/21   -VT Time first assessed: 0142 -VT Location: coccyx  -VT       Positioning and Restraints    Pre-Treatment Position  in bed  -        Post Treatment Position  bed  -CJ        In Bed  sitting EOB;call light within reach;encouraged to call for assist;exit alarm on;side rails up x1  -CJ           Progress Summary (OT)    Progress Toward Functional Goals (OT)  progress toward functional goals is good  -        Barriers to Overall Progress (OT)  Fall/NWB Rue/pain!  -CJ        Impairments Still Limiting Function (OT)  continur with ot poc!  -CJ          User Key  (r) = Recorded By, (t) = Taken By, (c) = Cosigned By    Initials Name Effective Dates    CJ Jacky Zhang, HOOD/L 06/16/21 -     VT Alyx Watson RN 08/02/16 - 06/15/21         Occupational Therapy Education                 Title: PT OT SLP Therapies (In Progress)     Topic: Occupational Therapy (Done)     Point: ADL training (Done)     Description:   Instruct learner(s) on proper safety adaptation and remediation techniques during self care or transfers.   Instruct in proper use of assistive devices.              Learning Progress Summary           Patient Acceptance, E, VU,DU,NR by  at 6/22/2021 0725    Comment: Pt. completed adl tasks with this hood/l assisting!    Acceptance, E,TB, VU,DU,NR by  at 6/20/2021 1357    Comment: Pt. performed adl shower/dressing with this hood/l assisting!    Acceptance, E, VU by  at 6/18/2021 1104                   Point: Home exercise program (Done)     Description:   Instruct learner(s) on appropriate technique for monitoring, assisting and/or progressing therapeutic exercises/activities.              Learning Progress Summary           Patient Acceptance, E,TB, VU,DU,NR by  at 6/21/2021 1000    Comment: Pt. performed ue exs with 2lb/ bar and 1lb. wrist wt!Elbow flex/ext only!                   Point: Precautions (Done)     Description:   Instruct learner(s) on prescribed precautions during self-care  and functional transfers.              Learning Progress Summary           Patient Acceptance, E, VU,DU,NR by  at 6/22/2021 0725    Comment: Pt. completed adl tasks with this hood/l assisting!    Acceptance, E,TB, VU,DU,NR by  at 6/21/2021 1000    Comment: Pt. performed ue exs with 2lb/ bar and 1lb. wrist wt!Elbow flex/ext only!    Acceptance, E,TB, VU,DU,NR by  at 6/20/2021 1357    Comment: Pt. performed adl shower/dressing with this hood/l assisting!    Acceptance, E, VU by  at 6/18/2021 1104                   Point: Body mechanics (Done)     Description:   Instruct learner(s) on proper positioning and spine alignment during self-care, functional mobility activities and/or exercises.              Learning Progress Summary           Patient Acceptance, E, VU,DU,NR by  at 6/22/2021 0725    Comment: Pt. completed adl tasks with this hood/l assisting!    Acceptance, E,TB, VU,DU,NR by  at 6/21/2021 1000    Comment: Pt. performed ue exs with 2lb/ bar and 1lb. wrist wt!Elbow flex/ext only!    Acceptance, E,TB, VU,DU,NR by  at 6/20/2021 1357    Comment: Pt. performed adl shower/dressing with this hood/l assisting!    Acceptance, E, VU by  at 6/18/2021 1104                               User Key     Initials Effective Dates Name Provider Type Discipline     06/16/21 -  Jacky Zhang HOOD/L Occupational Therapy Assistant OT     06/16/21 -  Kami Chapman OTR/L Occupational Therapist OT                  OT Recommendation and Plan     Progress Toward Functional Goals (OT): progress toward functional goals is good  Plan of Care Review  Plan of Care Reviewed With: patient  Progress: improving  Outcome Summary: Pt. completed adl showering/dressing with sba-min. assist from this hood/l!  Plan of Care Reviewed With: patient  Outcome Summary: Pt. completed adl showering/dressing with sba-min. assist from this hood/l!    Outcome Measures     Row Name 06/22/21 0725 06/21/21 1000 06/20/21 1357       How  much help from another is currently needed...    Putting on and taking off regular lower body clothing?  3  -CJ  2  -CJ  2  -CJ    Bathing (including washing, rinsing, and drying)  3  -CJ  2  -CJ  2  -CJ    Toileting (which includes using toilet bed pan or urinal)  3  -CJ  3  -CJ  3  -CJ    Putting on and taking off regular upper body clothing  3  -CJ  3  -CJ  3  -CJ    Taking care of personal grooming (such as brushing teeth)  4  -CJ  4  -CJ  4  -CJ    Eating meals  4  -CJ  4  -CJ  4  -CJ    AM-PAC 6 Clicks Score (OT)  20  -CJ  18  -CJ  18  -CJ       Functional Assessment    Outcome Measure Options  AM-PAC 6 Clicks Daily Activity (OT)  -CJ  AM-PAC 6 Clicks Daily Activity (OT)  -CJ  AM-PAC 6 Clicks Daily Activity (OT)  -CJ      User Key  (r) = Recorded By, (t) = Taken By, (c) = Cosigned By    Initials Name Provider Type     Jacky Zhang COTA/L Occupational Therapy Assistant          Time Calculation:   Time Calculation- OT     Row Name 06/22/21 0725             Time Calculation- OT    OT Start Time  0725  -      OT Stop Time  0807  -      OT Time Calculation (min)  42 min  -      Total Timed Code Minutes- OT  42 minute(s)  -      OT Received On  06/22/21  -         Timed Charges    25042 - OT Self Care/Mgmt Minutes  42  -CJ         Total Minutes    Timed Charges Total Minutes  42  -CJ       Total Minutes  42  -CJ        User Key  (r) = Recorded By, (t) = Taken By, (c) = Cosigned By    Initials Name Provider Type     Jacky Zhang COTA/L Occupational Therapy Assistant        Therapy Charges for Today     Code Description Service Date Service Provider Modifiers Qty    82048381628 HC OT THER PROC EA 15 MIN 6/21/2021 Jacky Zhang COTA/L GO 2    43721062081 HC OT SELF CARE/MGMT/TRAIN EA 15 MIN 6/22/2021 Jacky Zhang COTA/L GO 3               TAMIKO Alvarez  6/22/2021

## 2021-06-22 NOTE — CASE MANAGEMENT/SOCIAL WORK
Continued Stay Note   Db     Patient Name: Mini Gentile  MRN: [id number]  Today's Date: 6/22/2021    Admit Date: 6/16/2021    Discharge Plan     Row Name 06/22/21 1356       Plan    Plan  [facility] pending precert    Patient/Family in Agreement with Plan  yes    Plan Comments  Noted that pt may d/c on 6/24 after her last dose of Merrem. Elsy [phone] with [facility] aware and starting precert.        Discharge Codes    No documentation.             SACHI Cox

## 2021-06-23 NOTE — PROGRESS NOTES
Nephrology ([facility]) Progress Note      Patient:  Mini Gentile  YOB: 1960  Date of Service: 6/23/2021  MRN: [id number]   Acct: [id number]   Primary Care Physician: Bharati Dahl APRN  Advance Directive:   Code Status and Medical Interventions:   Ordered at: 06/17/21 0220     Code Status:    CPR     Medical Interventions (Level of Support Prior to Arrest):    Full     Admit Date: 6/16/2021       Hospital Day: 7  Referring Provider: Tejinder Kent MD      Patient personally seen and examined.  Complete chart including Consults, Notes, Operative Reports, Labs, Cardiology, and Radiology studies reviewed as able.    Chief complaint: Abnormal labs.    Subjective:  Mini Gentile is a 61 y.o. female  whom we were consulted for end stage renal disease. Maintenance hemodialysis on Tuesday Thursday Saturday at [facility]. Fairly noncompliant with dialysis treatments. She was just discharged from [facility] on 6/8. Unknown if she has been going to HD treatments since then.  Presented to ER after she was noted to be confused at nursing home.  She was diagnosed with sepsis caused by C. difficile colitis.    This morning she is sitting up and eating breakfast.  She feels well.  She denies any diarrhea.      Allergies:  Bupropion, Chantix [varenicline], Gabapentin, Azithromycin, Levofloxacin, Penicillins, and Sulfa antibiotics    Home Meds:  Medications Prior to Admission   Medication Sig Dispense Refill Last Dose   • aspirin 81 MG EC tablet Take 81 mg by mouth Daily.   6/16/2021 at Unknown time   • atropine 1 % ophthalmic solution Administer 1 drop into the left eye Daily.   6/16/2021 at Unknown time   • brimonidine (ALPHAGAN) 0.2 % ophthalmic solution Administer 1 drop into the left eye 2 (two) times a day.   6/16/2021 at Unknown time   • carvedilol (COREG) 6.25 MG tablet Take 6.25 mg by mouth 2 (Two) Times a Day With Meals.   6/16/2021 at Unknown time   •  cholecalciferol (VITAMIN D3) 1000 units tablet Take 2,000 Units by mouth Daily.   6/16/2021 at Unknown time   • dorzolamide (TRUSOPT) 2 % ophthalmic solution Administer 1 drop into the left eye 2 (Two) Times a Day.   6/16/2021 at Unknown time   • epoetin vika-epbx (RETACRIT) 52683 UNIT/ML injection Inject 1 mL under the skin into the appropriate area as directed 3 (Three) Times a Week. Indications: ESRD on Dialysis 6.6 mL  6/16/2021 at Unknown time   • famotidine (PEPCID) 20 MG tablet Take 1 tablet by mouth Daily.   6/16/2021 at Unknown time   • Ferric Citrate 1  MG(Fe) tablet Take 1 tablet by mouth 3 (Three) Times a Day With Meals.   6/16/2021 at Unknown time   • fluticasone (FLONASE) 50 MCG/ACT nasal spray 2 sprays into the nostril(s) as directed by provider 2 (Two) Times a Day.   6/16/2021 at Unknown time   • insulin lispro (humaLOG) 100 UNIT/ML injection Inject under the skin TID AC as per sliding scale:  200-249= 2 units  250-299= 4 units  300-349= 6 units  350-399= 8 units  400-500= 10 units and call MD   6/16/2021 at Unknown time   • metoclopramide (REGLAN) 5 MG tablet Take 1 tablet by mouth 4 (Four) Times a Day Before Meals & at Bedtime.   6/16/2021 at Unknown time   • rosuvastatin (CRESTOR) 5 MG tablet Take 5 mg by mouth every night at bedtime.   6/15/2021 at Unknown time   • saccharomyces boulardii (FLORASTOR) 250 MG capsule Take 250 mg by mouth Daily.   6/16/2021 at Unknown time   • sertraline (ZOLOFT) 25 MG tablet Take 1 tablet by mouth Daily.   6/16/2021 at Unknown time   • timolol (TIMOPTIC) 0.5 % ophthalmic solution Administer 1 drop into the left eye Every Morning.  12 6/16/2021 at Unknown time   • acetaminophen (TYLENOL) 325 MG tablet Take 650 mg by mouth Every 4 (Four) Hours As Needed for Mild Pain  or Fever.   Unknown at Unknown time   • albuterol sulfate  (90 Base) MCG/ACT inhaler Inhale 2 puffs Every 4 (Four) Hours As Needed for Wheezing. 18 g 0 Unknown at Unknown time   •  ipratropium-albuterol (DUO-NEB) 0.5-2.5 mg/3 ml nebulizer Take 3 mL by nebulization Every 4 (Four) Hours As Needed for Wheezing or Shortness of Air.   Unknown at Unknown time   • ondansetron (ZOFRAN) 4 MG tablet Take 1 tablet by mouth Every 6 (Six) Hours As Needed for Nausea or Vomiting. 24 tablet 2 Unknown at Unknown time       Medicines:  Current Facility-Administered Medications   Medication Dose Route Frequency Provider Last Rate Last Admin   • acetaminophen (TYLENOL) tablet 650 mg  650 mg Oral Q4H PRN Bin Guy DO   650 mg at 06/22/21 0050    Or   • acetaminophen (TYLENOL) suppository 650 mg  650 mg Rectal Q4H PRN Bin Guy DO       • aspirin EC tablet 81 mg  81 mg Oral Daily Bin Guy DO   81 mg at 06/23/21 0811   • atropine 1 % ophthalmic solution 1 drop  1 drop Left Eye Daily Bin Guy DO   1 drop at 06/23/21 0812   • brimonidine (ALPHAGAN) 0.2 % ophthalmic solution 1 drop  1 drop Left Eye BID PRN Bin Guy DO   1 drop at 06/23/21 0808   • carvedilol (COREG) tablet 6.25 mg  6.25 mg Oral BID With Meals Bin Guy DO   6.25 mg at 06/23/21 0748   • cholecalciferol (VITAMIN D3) tablet 2,000 Units  2,000 Units Oral Daily Bin Guy DO   2,000 Units at 06/23/21 0811   • dextrose (D50W) 25 g/ 50mL Intravenous Solution 25 g  25 g Intravenous Q15 Min PRN Bin Guy DO       • dextrose (GLUTOSE) oral gel 15 g  15 g Oral Q15 Min PRN Bin Guy DO       • epoetin vika-epbx (RETACRIT) injection 10,000 Units  10,000 Units Subcutaneous Once per day on Tue Thu Sat Bin Guy DO   10,000 Units at 06/22/21 0840   • famotidine (PEPCID) tablet 20 mg  20 mg Oral Once per day on Tue Thu Sat Bin Guy DO   20 mg at 06/22/21 1705   • Ferric Citrate tablet 840 mg  4 tablet Oral TID With Meals Bin Guy DO   840 mg at 06/23/21 0748   • fluticasone (FLONASE) 50 MCG/ACT nasal spray 2 spray  2 spray Nasal BID Bin Guy DO   2 spray at  06/23/21 0808   • glucagon (human recombinant) (GLUCAGEN DIAGNOSTIC) injection 1 mg  1 mg Subcutaneous Q15 Min PRN Bin Guy DO       • insulin lispro (humaLOG) injection 2-7 Units  2-7 Units Subcutaneous TID AC Bin Guy DO   3 Units at 06/23/21 0807   • ipratropium-albuterol (DUO-NEB) nebulizer solution 3 mL  3 mL Nebulization Q4H PRN Bin Guy DO   3 mL at 06/23/21 0201   • lactobacillus acidophilus (RISAQUAD) capsule 1 capsule  1 capsule Oral Daily Bin Guy DO   1 capsule at 06/23/21 0811   • meropenem (MERREM) 500mg/100 mL 0.9% NS IVPB (mbp)  500 mg Intravenous Q24H Fabio Glover MD   Stopped at 06/22/21 1713   • ondansetron (ZOFRAN) tablet 4 mg  4 mg Oral Q6H PRN Bin Guy DO        Or   • ondansetron (ZOFRAN) injection 4 mg  4 mg Intravenous Q6H PRN Bin Guy DO       • pravastatin (PRAVACHOL) tablet 10 mg  10 mg Oral Nightly Bin Guy DO   10 mg at 06/22/21 2059   • sertraline (ZOLOFT) tablet 25 mg  25 mg Oral Daily Bin Guy DO   25 mg at 06/23/21 0812   • sodium chloride 0.9 % bolus 100 mL  100 mL Intravenous PRN Bin Guy DO       • sodium chloride 0.9 % bolus 100 mL  100 mL Intravenous PRN Adi Gonzalez MD       • sodium chloride 0.9 % flush 10 mL  10 mL Intravenous PRN Bin Guy DO       • sodium chloride 0.9 % flush 10 mL  10 mL Intravenous Q12H Bin Guy DO   10 mL at 06/23/21 0813   • sodium chloride 0.9 % flush 10 mL  10 mL Intravenous PRN Bin Guy DO       • timolol (TIMOPTIC) 0.5 % ophthalmic solution 1 drop  1 drop Left Eye Daily Bin Guy DO   1 drop at 06/23/21 0813   • vancomycin oral solution reconstituted 125 mg  125 mg Oral Q6H Bin Guy, DO   125 mg at 06/23/21 0533       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    • Chronic otitis media    • Diabetes (CMS/HCC)    • End stage renal disease on  dialysis (CMS/[facility])    • Eustachian tube dysfunction    • GERD (gastroesophageal reflux disease)    • Glaucoma    • HTN (hypertension)    • Hyperlipidemia    • Mucoid otitis media    • Noncompliance of patient with renal dialysis (CMS/[facility])    • Tobacco abuse        Past Surgical History:  Past Surgical History:   Procedure Laterality Date   • ARTERIOVENOUS FISTULA     • CATARACT EXTRACTION WITH INTRAOCULAR LENS IMPLANT     •  SECTION     • CHOLECYSTECTOMY     • MYRINGOTOMY W/ TUBES Bilateral    • MYRINGOTOMY W/ TUBES Right    • TUBAL ABDOMINAL LIGATION         Family History  Family History   Problem Relation Age of Onset   • Heart disease Mother    • Diabetes Father    • Colon cancer Neg Hx    • Colon polyps Neg Hx        Social History  Social History     Socioeconomic History   • Marital status: Single     Spouse name: Not on file   • Number of children: Not on file   • Years of education: Not on file   • Highest education level: Not on file   Tobacco Use   • Smoking status: Current Every Day Smoker     Packs/day: 2.00     Years: 6.00     Pack years: 12.00     Types: Cigarettes   • Smokeless tobacco: Never Used   Vaping Use   • Vaping Use: Never used   Substance and Sexual Activity   • Alcohol use: No   • Drug use: No   • Sexual activity: Defer         Review of Systems:  History obtained from chart review and the patient  General ROS: No fever or chills  Respiratory ROS: No cough, shortness of breath, wheezing  Cardiovascular ROS: No chest pain or palpitations  Gastrointestinal ROS: No abdominal pain or melena  Genito-Urinary ROS: No dysuria or hematuria    Objective:  Patient Vitals for the past 24 hrs:   BP Temp Temp src Pulse Resp SpO2   21 0756 167/73 98 °F (36.7 °C) Oral 82 18 --   21 0312 142/68 97.8 °F (36.6 °C) Oral 79 18 --   21 0210 -- -- -- 75 20 97 %   21 0201 -- -- -- 78 20 95 %   21 0143 -- -- -- -- -- 95 %   21 2303 154/66 98.2 °F (36.8 °C) Oral 75 18 --    06/22/21 2000 131/69 98 °F (36.7 °C) Oral 70 16 --   06/22/21 1346 131/64 97.6 °F (36.4 °C) Oral 76 16 --   06/22/21 1238 144/65 98 °F (36.7 °C) Oral 71 18 --       Intake/Output Summary (Last 24 hours) at 6/23/2021 1106  Last data filed at 6/22/2021 1713  Gross per 24 hour   Intake 100 ml   Output 2500 ml   Net -2400 ml     General: awake/alert   HEENT: Normocephalic atraumatic head.  Neck: Supple with no JVD or carotid bruits.  Chest:  clear to auscultation bilaterally without respiratory distress  CVS: regular rate and rhythm  Abdominal: soft, nontender, positive bowel sounds  Extremities: no cyanosis or edema  Skin: warm and dry without rash      Labs:  Results from last 7 days   Lab Units 06/22/21  0436 06/19/21  0550 06/18/21  0250   WBC 10*3/mm3 5.35 7.47 8.02   HEMOGLOBIN g/dL 10.4* 9.2* 8.9*   HEMATOCRIT % 34.7 30.1* 29.4*   PLATELETS 10*3/mm3 195 121* 126*         Results from last 7 days   Lab Units 06/22/21  0436 06/21/21  0551 06/20/21  0625 06/17/21  0609 06/16/21  1706 06/16/21  1245   SODIUM mmol/L 131* 133* 132* 135* 137 137   POTASSIUM mmol/L 3.9 4.0 4.2 3.8 3.1* 3.1*   CHLORIDE mmol/L 92* 94* 94* 94* 95* 94*   CO2 mmol/L 28.0 28.0 25.0 28.0 28.0 28.0   BUN mg/dL 28* 22 16 30* 25* 25*   CREATININE mg/dL 4.09* 3.59* 2.43* 4.26* 4.06* 3.68*   CALCIUM mg/dL 8.9 9.1 9.2 9.2 9.1 9.0   BILIRUBIN mg/dL  --   --   --  0.8 0.6 0.6   ALK PHOS U/L  --   --   --  200* 199* 222*   ALT (SGPT) U/L  --   --   --  18 19 20   AST (SGOT) U/L  --   --   --  29 36* 36*   GLUCOSE mg/dL 178* 165* 181* 97 113* 150*       Radiology:   Imaging Results (Last 72 Hours)     ** No results found for the last 72 hours. **          Culture:  Blood Culture   Date Value Ref Range Status   06/19/2021 No growth at less than 24 hours  Preliminary   06/16/2021 Pseudomonas aeruginosa (C)  Preliminary     Comment:     Refer to previous blood culture collected 6/16/2021 at 1840 for VICKEY's.     06/16/2021 Pseudomonas aeruginosa (C)   Preliminary     Urine Culture   Date Value Ref Range Status   06/16/2021 >100,000 CFU/mL Mixed Janeth Isolated  Final         Assessment   1.  End-stage renal disease/dialysis dependent.  2.  Type II diabetic nephropathy.  3.  C. difficile colitis.  4.  Chronic obstructive pulmonary disease/active tobacco use.  5.  Anemia of chronic kidney disease.  6.  Noncompliant.  7.  Metabolic encephalopathy improved.  8.  Hyponatremia.       Plan:  1.  P.o. vancomycin  2.  Hemodialysis in a.m.  3.  Continue ELVIS.  4.  Nursing home placement.        Jose Caballero MD  6/23/2021  11:06 CDT

## 2021-06-23 NOTE — PLAN OF CARE
Goal Outcome Evaluation:  Plan of Care Reviewed With: patient        Progress: no change  Outcome Summary: Multiple small incontinent BM's; Denies need for pain or nausea; IID; Up with assist; PO vanc; Left Fistula; Frequent requests; safety maintained

## 2021-06-23 NOTE — THERAPY DISCHARGE NOTE
Acute Care - Speech Language Pathology   Swallow Treatment Note/Discharge  bD     Patient Name: Mini Gentile  : 1960  MRN: [id number]  Today's Date: 2021               Admit Date: 2021  Swallow treatment completed. The patient is alert and sitting in chair. She reports no concern with toleration of current diet. She completed trials of regular solids with functional rotary chew. Thin liquids completed with no overt s/s of aspiration. Patient OK to upgrade to regular solids and continue thin liquids. SLP signing off at this time. If any concerns please re-consult.   Maia Gonsalez, MS CCC-SLP 2021 09:32 CDT    Visit Dx:    ICD-10-CM ICD-9-CM   1. Altered mental status, unspecified altered mental status type  R41.82 780.97   2. Hypoxia  R09.02 799.02   3. Elevated troponin  R77.8 790.6   4. End stage renal disease (CMS/[facility])  N18.6 585.6   5. Anemia, unspecified type  D64.9 285.9   6. Hypotension, unspecified hypotension type  I95.9 458.9   7. Fall, initial encounter  W19.XXXA E888.9   8. Dysphagia, unspecified type  R13.10 787.20   9. Decreased activities of daily living (ADL)  Z78.9 V49.89   10. 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   • Eustachian tube dysfunction   • Chronic otitis media   • Pneumonia of left upper lobe due to Streptococcus (CMS/[facility])   • End stage renal failure on dialysis (CMS/[facility])   • Diabetes (CMS/[facility])   • Glaucoma   • HTN (hypertension)   • Proteinuria   • DM2 (diabetes mellitus, type 2) (CMS/[facility])   • Chronic anemia   • Acute pulmonary edema (CMS/[facility])   • Non-compliance with renal dialysis (CMS/[facility])   • Elevated troponin due to ESRD    • Hyperkalemia   • Encephalopathy, metabolic, due to uremia   • High anion gap metabolic acidosis due to uremia   • Respiratory distress   • Acute diastolic heart failure (CMS/[facility])   • Lymph node enlargement, left axillary measuring  1.1  -follow-up for primary care   • Nausea vomiting and diarrhea   • Acute diastolic heart failure (CMS/[facility])   • Diabetes mellitus with ophthalmic complication (CMS/[facility])   • Tobacco abuse   • Urinary tract infection with hematuria   • Pneumonia due to infectious organism   • Abnormal urine findings   • Closed fracture of right proximal humerus   • Hyponatremia   • Hypothyroidism (acquired)   • Forehead laceration secondary to fall   • Heme positive stool   • Dialysis AV fistula malfunction, initial encounter (CMS/[facility])   • Aspiration into airway   • Bronchospasm, acute   • Altered mental status   • Protein-calorie malnutrition (CMS/[facility])   • Pupil irregular   • Sepsis (CMS/[facility])   • Hypoglycemia   • Hyponatremia   • Diarrhea   • Hypokalemia   • C. 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    • Chronic otitis media    • Diabetes (CMS/[facility])    • End stage renal disease on dialysis (CMS/[facility])    • Eustachian tube dysfunction    • GERD (gastroesophageal reflux disease)    • Glaucoma    • HTN (hypertension)    • Hyperlipidemia    • Mucoid otitis media    • Noncompliance of patient with renal dialysis (CMS/[facility])    • Tobacco abuse      Past Surgical History:   Procedure Laterality Date   • ARTERIOVENOUS FISTULA     • CATARACT EXTRACTION WITH INTRAOCULAR LENS IMPLANT     •  SECTION     • CHOLECYSTECTOMY     • MYRINGOTOMY W/ TUBES Bilateral    • MYRINGOTOMY W/ TUBES Right    • TUBAL ABDOMINAL LIGATION            SWALLOW EVALUATION (last 72 hours)      SLP Adult Swallow Evaluation     Row Name 21 0911                   Rehab Evaluation    Document Type  therapy note (daily note)  -MM        Subjective Information  no complaints  -MM        Patient Observations  alert;cooperative;agree to therapy  -MM        Patient/Family/Caregiver Comments/Observations  No family present   -MM        Care Plan Review  care  plan/treatment goals reviewed  -MM        Care Plan Review, Other Participant(s)  other (see comments) JENNIFFER Sanchez   -MM        Patient Effort  good  -MM           Pain    Additional Documentation  Pain Scale: FACES Pre/Post-Treatment (Group)  -MM           Pain Scale: FACES Pre/Post-Treatment    Pain: FACES Scale, Pretreatment  0-->no hurt  -MM        Posttreatment Pain Rating  0-->no hurt  -MM           Clinical Impression    Daily Summary of Progress (SLP)  progress toward functional goals as expected  -MM        Plan for Continued Treatment (SLP)  Upgrade to regular and sign off  -MM           Swallow Goals (SLP)    Oral Nutrition/Hydration Goal Selection (SLP)  oral nutrition/hydration, SLP goal 1  -MM           Oral Nutrition/Hydration Goal 1 (SLP)    Oral Nutrition/Hydration Goal 1, SLP  Patient will tolerate LRD without s/s of aspiration.  -MM        Time Frame (Oral Nutrition/Hydration Goal 1, SLP)  by discharge  -MM        Barriers (Oral Nutrition/Hydration Goal 1, SLP)  none  -MM        Progress/Outcomes (Oral Nutrition/Hydration Goal 1, SLP)  goal met  -MM          User Key  (r) = Recorded By, (t) = Taken By, (c) = Cosigned By    Initials Name Effective Dates    MM Maia Gonsalez, MS CCC-SLP 06/16/21 -           EDUCATION  The patient has been educated in the following areas:   Dysphagia (Swallowing Impairment) Oral Care/Hydration.    SLP Recommendation and Plan                    Anticipated Discharge Disposition (SLP): skilled nursing facility              Daily Summary of Progress (SLP): progress toward functional goals as expected  Plan for Continued Treatment (SLP): Upgrade to regular and sign off  Anticipated Discharge Disposition (SLP): skilled nursing facility        Reason for Discharge: all goals and outcomes met, no further needs identified    Plan of Care Reviewed With: patient, other (see comments) (JENNIFFER Sanchez)  Progress: improving    SLP GOALS     Row Name 06/23/21 0911             Oral  Nutrition/Hydration Goal 1 (SLP)    Oral Nutrition/Hydration Goal 1, SLP  Patient will tolerate LRD without s/s of aspiration.  -MM      Time Frame (Oral Nutrition/Hydration Goal 1, SLP)  by discharge  -MM      Barriers (Oral Nutrition/Hydration Goal 1, SLP)  none  -MM      Progress/Outcomes (Oral Nutrition/Hydration Goal 1, SLP)  goal met  -MM        User Key  (r) = Recorded By, (t) = Taken By, (c) = Cosigned By    Initials Name Provider Type    Maia Garcia MS CCC-SLP Speech and Language Pathologist               Time Calculation:   Time Calculation- SLP     Row Name 06/23/21 0931             Time Calculation- SLP    SLP Start Time  0911  -MM      SLP Stop Time  0935  -MM      SLP Time Calculation (min)  24 min  -MM      SLP Received On  06/23/21  -MM         Untimed Charges    47613-MQ Treatment Swallow Minutes  24  -MM         Total Minutes    Untimed Charges Total Minutes  24  -MM       Total Minutes  24  -MM        User Key  (r) = Recorded By, (t) = Taken By, (c) = Cosigned By    Initials Name Provider Type    Maia Garcia MS CCC-SLP Speech and Language Pathologist          Therapy Charges for Today     Code Description Service Date Service Provider Modifiers Qty    [id number] HC ST TREATMENT SWALLOW 2 6/23/2021 Maia Gonsalez MS CCC-SLP GN 1               SLP Discharge Summary  Anticipated Discharge Disposition (SLP): skilled nursing facility  Reason for Discharge: all goals and outcomes met, no further needs identified  Progress Toward Achieving Short/long Term Goals: all goals met within established timelines  Discharge Destination: other (see comments) (Remains in acute care)    MS BERNY Liao  6/23/2021

## 2021-06-23 NOTE — PLAN OF CARE
Goal Outcome Evaluation:  Plan of Care Reviewed With: patient, other (see comments) (JENNIFFER Sanchez)        Progress: improving       Swallow treatment completed. The patient is alert and sitting in chair. She reports no concern with toleration of current diet. She completed trials of regular solids with functional rotary chew. Thin liquids completed with no overt s/s of aspiration. Patient OK to upgrade to regular solids and continue thin liquids. SLP signing off at this time. If any concerns please re-consult.     Maia Gonsalez MS CCC-SLP 6/23/2021 09:30 CDT

## 2021-06-23 NOTE — PLAN OF CARE
Goal Outcome Evaluation:           Progress: improving  Outcome Summary: Pt is more oriented this shift and has been up in chair most of shift. Has had 2 BM's that were soft and formed. IV antibiotics continued. Denies any c/o pain. Oral vancomycin continued.

## 2021-06-23 NOTE — PLAN OF CARE
Goal Outcome Evaluation:  Plan of Care Reviewed With: patient        Progress: improving  Outcome Summary: Performed ue exs to increase her transfers and sit-stand! No issues with tx!

## 2021-06-23 NOTE — CASE MANAGEMENT/SOCIAL WORK
Continued Stay Note  LISE Ace     Patient Name: Mini Gentile  MRN: [id number]  Today's Date: 6/23/2021    Admit Date: 6/16/2021    Discharge Plan     Row Name 06/23/21 1207       Plan    Plan  [facility] pending precert    Plan Comments  Spoke with pt and she does plan to return to [facility] at d/c. They have started precert. Informed Elsy that pt will likely need oral vanco. Will follow for approval.        Discharge Codes    No documentation.             SACHI Cox

## 2021-06-23 NOTE — PLAN OF CARE
"Goal Outcome Evaluation:  Plan of Care Reviewed With: patient        Progress: improving  Outcome Summary: Pt in BR. sit-stand cga help to clean up. Pt was able to amb 175' HH cga x1. discussed safety concerns/awareness and POC. Pt had R humerus fx 3/26 non op NWB still unsure of WB status however pt reports she has been using rwx at snf just not putting \"alot of weight and using it for balance\" Feel pt is safe to return back to snf when medically stable  "

## 2021-06-23 NOTE — THERAPY TREATMENT NOTE
Acute Care - Physical Therapy Treatment Note  [facility]     Patient Name: Mini Gentile  : 1960  MRN: [id number]  Today's Date: 2021           PT Assessment (last 12 hours)      PT Evaluation and Treatment     Row Name 21 1453 21 1404       Physical Therapy Time and Intention    Subjective Information  complains of;fatigue  -NW  --  -NW    Document Type  therapy note (daily note)  -NW  --    Mode of Treatment  physical therapy  -NW  --    Patient Effort  good  -NW  --    Row Name 21 1047          Physical Therapy Time and Intention    Subjective Information  no complaints  -NW     Document Type  therapy note (daily note)  -NW     Mode of Treatment  physical therapy  -NW     Comment  R humerus fx on 3/26 no op unsure of cont WB status will cont NWB pt reports she has been using rwx at [facility]  -NW     Row Name 21 1453 21 1047       General Information    Existing Precautions/Restrictions  fall;non-weight bearing;right NWB RUE  -NW  fall;non-weight bearing;right NWB RUE  -NW    Row Name 21 1453 21 1047       Pain Scale: FACES Pre/Post-Treatment    Pain: FACES Scale, Pretreatment  0-->no hurt  -NW  0-->no hurt  -NW    Posttreatment Pain Rating  0-->no hurt  -NW  0-->no hurt  -NW    Row Name 21 1453 21 1047       Bed Mobility    Supine-Sit [place] (Bed Mobility)  verbal cues;contact guard  -NW  --    Sit-Supine [place] (Bed Mobility)  --  verbal cues;contact guard  -NW    Bed Mobility, Safety Issues  decreased use of arms for pushing/pulling  -NW  decreased use of arms for pushing/pulling  -NW    Assistive Device (Bed Mobility)  bed rails  -NW  bed rails  -NW    Comment (Bed Mobility)  needs cues for mobility  -NW  --    Row Name 21 1453 21 1047       Transfers    Transfers  sit-stand transfer;stand-sit transfer  -NW  sit-stand transfer;stand-sit transfer  -NW    Sit-Stand [place] (Transfers)  verbal cues;standby assist  -NW   verbal cues;standby assist  -NW    Stand-Sit [place] (Transfers)  supervision  -NW  supervision  -NW    [place] Level (Toilet Transfer)  --  verbal cues;contact guard;standby assist  -NW    Row Name 06/23/21 1453 06/23/21 1047       Gait/Stairs (Locomotion)    [place] Level (Gait)  verbal cues;contact guard  -NW  verbal cues;contact guard  -NW    Assistive Device (Gait)  -- HH  -NW  -- HH  -NW    Distance in Feet (Gait)  100x2  -NW  175  -NW    Pattern (Gait)  step-through  -NW  step-through  -NW    Deviations/Abnormal Patterns (Gait)  lopez decreased;stride length decreased  -NW  lopez decreased;stride length decreased  -NW    Bilateral Gait Deviations  forward flexed posture  -NW  forward flexed posture  -NW    Comment (Gait/Stairs)  mult standing rest during gait   -NW  --    Row Name 06/23/21 1453 06/23/21 1047       Safety Issues, Functional Mobility    Impairments Affecting Function (Mobility)  balance;cognition  -NW  balance;cognition  -NW    Row Name             Wound 04/04/21 0142 coccyx    Wound - Properties Group Placement Date: 04/04/21  -VT Placement Time: 0142 -VT Location: coccyx  -VT    Retired Wound - Properties Group Date first assessed: 04/04/21  -VT Time first assessed: 0142 -VT Location: coccyx  -VT    Row Name 06/23/21 1453 06/23/21 1047       Positioning and Restraints    Pre-Treatment Position  in bed  -NW  bathroom  -NW    Post Treatment Position  chair  -NW  bed  -NW    In Bed  --  fowlers;call light within reach;encouraged to call for assist  -NW    In Chair  reclined;call light within reach;encouraged to call for assist;notified nsg  -NW  --      User Key  (r) = Recorded By, (t) = Taken By, (c) = Cosigned By    Initials Name Provider Type    NW Kaite June PTA Physical Therapy Assistant    Alyx Kumar, RN Registered Nurse        Physical Therapy Education                 Title: PT OT SLP Therapies (In Progress)     Topic: Physical Therapy (In  "Progress)     Point: Mobility training (Done)     Learning Progress Summary           Patient Acceptance, E, VU by NAOMI at 6/21/2021 0904    Comment: progression with amb    Acceptance, E, VU by NAOMI at 6/20/2021 0841    Comment: safety with mobility and amb    Acceptance, E, VU,NR by ARIES at 6/18/2021 1023    Comment: progression of PT POC, benefits of activity                   Point: Home exercise program (Not Started)     Learner Progress:  Not documented in this visit.          Point: Body mechanics (Not Started)     Learner Progress:  Not documented in this visit.          Point: Precautions (Not Started)     Learner Progress:  Not documented in this visit.                      User Key     Initials Effective Dates Name Provider Type Discipline    ARIES 08/02/16 -  Natanael Pardo, PT DPT Physical Therapist PT    NAOMI 12/08/16 -  Domingo Dill PTA Physical Therapy Assistant PT              PT Recommendation and Plan     Plan of Care Reviewed With: patient  Progress: improving  Outcome Summary: Pt in BR. sit-stand cga help to clean up. Pt was able to amb 175' HH cga x1. discussed safety concerns/awareness and POC. Pt had R humerus fx 3/26 non op NWB still unsure of WB status however pt reports she has been using rwx at snf just not putting \"alot of weight and using it for balance\" Feel pt is safe to return back to snf when medically stable  Outcome Measures     Row Name 06/23/21 0754 06/22/21 0725 06/21/21 1000       How much help from another is currently needed...    Putting on and taking off regular lower body clothing?  3  -CJ  3  -CJ  2  -CJ    Bathing (including washing, rinsing, and drying)  3  -CJ  3  -CJ  2  -CJ    Toileting (which includes using toilet bed pan or urinal)  3  -CJ  3  -CJ  3  -CJ    Putting on and taking off regular upper body clothing  3  -CJ  3  -CJ  3  -CJ    Taking care of personal grooming (such as brushing teeth)  4  -CJ  4  -CJ  4  -CJ    Eating meals  4  -CJ  4  -CJ  4  -CJ    " AM-PAC 6 Clicks Score (OT)  20  -CJ  20  -CJ  18  -       Functional Assessment    Outcome Measure Options  AM-PAC 6 Clicks Daily Activity (OT)  -  AM-PAC 6 Clicks Daily Activity (OT)  -  AM-PAC 6 Clicks Daily Activity (OT)  -      User Key  (r) = Recorded By, (t) = Taken By, (c) = Cosigned By    Initials Name Provider Type     Jacky Zhang COTA/L Occupational Therapy Assistant           Time Calculation:   PT Charges     Row Name 06/23/21 1516 06/23/21 1515 06/23/21 1113       Time Calculation    Start Time  --  1453  -NW  1047  -NW    Stop Time  --  1516  -NW  1113  -NW    Time Calculation (min)  --  23 min  -NW  26 min  -NW    PT Received On  --  --  06/23/21  -NW    PT Goal Re-Cert Due Date  --  --  06/28/21  -NW       Time Calculation- PT    Total Timed Code Minutes- PT  --  23 minute(s)  -NW  26 minute(s)  -NW       Timed Charges    48482 - Gait Training Minutes   23  -NW  --  26  -NW       Total Minutes    Timed Charges Total Minutes  23  -NW  --  26  -NW     Total Minutes  23  -NW  --  26  -NW      User Key  (r) = Recorded By, (t) = Taken By, (c) = Cosigned By    Initials Name Provider Type     Katie June PTA Physical Therapy Assistant        Therapy Charges for Today     Code Description Service Date Service Provider Modifiers Qty    17603926499 HC GAIT TRAINING EA 15 MIN 6/23/2021 Katie June PTA GP 2    91134420585 HC GAIT TRAINING EA 15 MIN 6/23/2021 Katie June PTA GP 2          PT G-Codes  Outcome Measure Options: AM-PAC 6 Clicks Daily Activity (OT)  AM-PAC 6 Clicks Score (PT): 17  AM-PAC 6 Clicks Score (OT): 20    Katie June PTA  6/23/2021

## 2021-06-23 NOTE — THERAPY TREATMENT NOTE
Acute Care - Occupational Therapy Treatment Note  [facility]     Patient Name: Mini Gentile  : 1960  MRN: [id number]  Today's Date: 2021             Admit Date: 2021       ICD-10-CM ICD-9-CM   1. Altered mental status, unspecified altered mental status type  R41.82 780.97   2. Hypoxia  R09.02 799.02   3. Elevated troponin  R77.8 790.6   4. End stage renal disease (CMS/HCC)  N18.6 585.6   5. Anemia, unspecified type  D64.9 285.9   6. Hypotension, unspecified hypotension type  I95.9 458.9   7. Fall, initial encounter  W19.XXXA E888.9   8. Dysphagia, unspecified type  R13.10 787.20   9. Decreased activities of daily living (ADL)  Z78.9 V49.89   10. 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   • Eustachian tube dysfunction   • Chronic otitis media   • Pneumonia of left upper lobe due to Streptococcus (CMS/[facility])   • End stage renal failure on dialysis (CMS/[facility])   • Diabetes (CMS/[facility])   • Glaucoma   • HTN (hypertension)   • Proteinuria   • DM2 (diabetes mellitus, type 2) (CMS/[facility])   • Chronic anemia   • Acute pulmonary edema (CMS/[facility])   • Non-compliance with renal dialysis (CMS/[facility])   • Elevated troponin due to ESRD    • Hyperkalemia   • Encephalopathy, metabolic, due to uremia   • High anion gap metabolic acidosis due to uremia   • Respiratory distress   • Acute diastolic heart failure (CMS/[facility])   • Lymph node enlargement, left axillary measuring 1.1 cm -follow-up for primary care   • Nausea vomiting and diarrhea   • Acute diastolic heart failure (CMS/HCC)   • Diabetes mellitus with ophthalmic complication (CMS/[facility])   • Tobacco abuse   • Urinary tract infection with hematuria   • Pneumonia due to infectious organism   • Abnormal urine findings   • Closed fracture of right proximal humerus   • Hyponatremia   • Hypothyroidism (acquired)   • Forehead laceration secondary to fall   • Heme positive stool   • Dialysis  AV fistula malfunction, initial encounter (CMS/[facility])   • Aspiration into airway   • Bronchospasm, acute   • Altered mental status   • Protein-calorie malnutrition (CMS/[facility])   • Pupil irregular   • Sepsis (CMS/[facility])   • Hypoglycemia   • Hyponatremia   • Diarrhea   • Hypokalemia   • C. 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    • Chronic otitis media    • Diabetes (CMS/[facility])    • End stage renal disease on dialysis (CMS/[facility])    • Eustachian tube dysfunction    • GERD (gastroesophageal reflux disease)    • Glaucoma    • HTN (hypertension)    • Hyperlipidemia    • Mucoid otitis media    • Noncompliance of patient with renal dialysis (CMS/[facility])    • Tobacco abuse      Past Surgical History:   Procedure Laterality Date   • ARTERIOVENOUS FISTULA     • CATARACT EXTRACTION WITH INTRAOCULAR LENS IMPLANT     •  SECTION     • CHOLECYSTECTOMY     • MYRINGOTOMY W/ TUBES Bilateral    • MYRINGOTOMY W/ TUBES Right    • TUBAL ABDOMINAL LIGATION              OT ASSESSMENT FLOWSHEET (last 12 hours)      OT Evaluation and Treatment     Row Name 21 1109 21 0754                OT Time and Intention    Subjective Information  --  no complaints  -       Document Type  --  therapy note (daily note)  -       Mode of Treatment  --  occupational therapy  -       Patient Effort  --  good  -          General Information    Existing Precautions/Restrictions  --  fall;non-weight bearing NWB Rue!  -          Pain Assessment    Additional Documentation  --  Pain Scale: Word Pre/Post-Treatment (Group)  -          Pain Scale: Numbers Pre/Post-Treatment    Pain Intervention(s)  --  Repositioned;Rest  -          Pain Scale: Word Pre/Post-Treatment    Pain: Word Scale, Pretreatment  --  2 - mild pain  -       Posttreatment Pain Rating  --  2 - mild pain  -       Pain Location - Side  --  Right  -       Pain  Location - Orientation  --  upper  -       Pain Location  --  extremity;shoulder  -          Bed Mobility    Bed Mobility  --  supine-sit  -       All Activities, [place] (Bed Mobility)  --  standby assist;supervision  -          Functional Mobility    Functional Mobility- Ind. Level  --  contact guard assist;supervision required;verbal cues required;standby assist  -       Functional Mobility- Device  --  -- HHA!  -       Functional Mobility-Distance (Feet)  --  4  -          Transfers    Bed-Chair [place] (Transfers)  --  set up;standby assist;supervision;verbal cues;contact guard  -       Sit-Stand [place] (Transfers)  --  standby assist;supervision;verbal cues  -       Stand-Sit [place] (Transfers)  --  standby assist;supervision;verbal cues  -          Motor Skills    Motor Skills  therapeutic exercise  -  therapeutic exercise  -       Therapeutic Exercise  --  elbow/forearm  -          Shoulder (Therapeutic Exercise)    Shoulder (Therapeutic Exercise)  --  --  -       Shoulder AROM (Therapeutic Exercise)  --  --  -       Shoulder Strengthening (Therapeutic Exercise)  --  --  -          Elbow/Forearm (Therapeutic Exercise)    Elbow/Forearm (Therapeutic Exercise)  --  AROM (active range of motion);strengthening exercise  -       Elbow/Forearm AROM (Therapeutic Exercise)  --  bilateral;flexion;extension;sitting;10 repetitions;2 sets  -       Elbow/Forearm Strengthening (Therapeutic Exercise)  --  bilateral;flexion;extension;sitting;2 lb free weight;3 lb free weight  -          Wound 04/04/21 0142 coccyx    Wound - Properties Group Placement Date: 04/04/21  -VT Placement Time: 0142 -VT Location: coccyx  -VT    Retired Wound - Properties Group Date first assessed: 04/04/21  -VT Time first assessed: 0142 -VT Location: coccyx  -VT       Positioning and Restraints    Pre-Treatment Position  --  in bed  -       Post Treatment Position  --  chair  -        In Chair  --  sitting;call light within reach;encouraged to call for assist  -          Progress Summary (OT)    Progress Toward Functional Goals (OT)  --  progress toward functional goals is good  -       Barriers to Overall Progress (OT)  --  Fall/NWB RUE  -       Impairments Still Limiting Function (OT)  --  continue with ot poc!  -         User Key  (r) = Recorded By, (t) = Taken By, (c) = Cosigned By    Initials Name Effective Dates    CJ Jacky Zhang, GENTILE/L 06/16/21 -     Alyx Kumar RN 08/02/16 - 06/15/21         Occupational Therapy Education                 Title: PT OT SLP Therapies (In Progress)     Topic: Occupational Therapy (Done)     Point: ADL training (Done)     Description:   Instruct learner(s) on proper safety adaptation and remediation techniques during self care or transfers.   Instruct in proper use of assistive devices.              Learning Progress Summary           Patient Acceptance, E,TB, VU,DU,NR by  at 6/23/2021 0754    Comment: Pt. performed ue exs and transfers bed-chair!    Acceptance, E, VU,DU,NR by  at 6/22/2021 0725    Comment: Pt. completed adl tasks with this gentile/l assisting!    Acceptance, E,TB, VU,DU,NR by  at 6/20/2021 1357    Comment: Pt. performed adl shower/dressing with this gentile/l assisting!    Acceptance, E, VU by MICHAEL at 6/18/2021 1104                   Point: Home exercise program (Done)     Description:   Instruct learner(s) on appropriate technique for monitoring, assisting and/or progressing therapeutic exercises/activities.              Learning Progress Summary           Patient Acceptance, E,TB, VU,DU,NR by  at 6/23/2021 0754    Comment: Pt. performed ue exs and transfers bed-chair!    Acceptance, E,TB, VU,DU,NR by  at 6/21/2021 1000    Comment: Pt. performed ue exs with 2lb/ bar and 1lb. wrist wt!Elbow flex/ext only!                   Point: Precautions (Done)     Description:   Instruct learner(s) on prescribed precautions  during self-care and functional transfers.              Learning Progress Summary           Patient Acceptance, E,TB, VU,DU,NR by  at 6/23/2021 0754    Comment: Pt. performed ue exs and transfers bed-chair!    Acceptance, E, VU,DU,NR by  at 6/22/2021 0725    Comment: Pt. completed adl tasks with this hood/l assisting!    Acceptance, E,TB, VU,DU,NR by  at 6/21/2021 1000    Comment: Pt. performed ue exs with 2lb/ bar and 1lb. wrist wt!Elbow flex/ext only!    Acceptance, E,TB, VU,DU,NR by  at 6/20/2021 1357    Comment: Pt. performed adl shower/dressing with this hood/l assisting!    Acceptance, E, VU by TSERING at 6/18/2021 1104                   Point: Body mechanics (Done)     Description:   Instruct learner(s) on proper positioning and spine alignment during self-care, functional mobility activities and/or exercises.              Learning Progress Summary           Patient Acceptance, E,TB, VU,DU,NR by  at 6/23/2021 0754    Comment: Pt. performed ue exs and transfers bed-chair!    Acceptance, E, VU,DU,NR by  at 6/22/2021 0725    Comment: Pt. completed adl tasks with this hood/l assisting!    Acceptance, E,TB, VU,DU,NR by  at 6/21/2021 1000    Comment: Pt. performed ue exs with 2lb/ bar and 1lb. wrist wt!Elbow flex/ext only!    Acceptance, E,TB, VU,DU,NR by  at 6/20/2021 1357    Comment: Pt. performed adl shower/dressing with this hood/l assisting!    Acceptance, E, VU by MICHAEL at 6/18/2021 1104                               User Key     Initials Effective Dates Name Provider Type Discipline     06/16/21 -  Jacky Zhang HOOD/L Occupational Therapy Assistant OT     06/16/21 -  Kami Chapman OTR/L Occupational Therapist OT                  OT Recommendation and Plan     Progress Toward Functional Goals (OT): progress toward functional goals is good  Plan of Care Review  Plan of Care Reviewed With: patient  Progress: improving  Outcome Summary: Performed ue exs to increase her transfers and  sit-stand! No issues with tx!  Plan of Care Reviewed With: patient  Outcome Summary: Performed ue exs to increase her transfers and sit-stand! No issues with tx!    Outcome Measures     Row Name 06/23/21 0754 06/22/21 0725 06/21/21 1000       How much help from another is currently needed...    Putting on and taking off regular lower body clothing?  3  -CJ  3  -CJ  2  -CJ    Bathing (including washing, rinsing, and drying)  3  -CJ  3  -CJ  2  -CJ    Toileting (which includes using toilet bed pan or urinal)  3  -CJ  3  -CJ  3  -CJ    Putting on and taking off regular upper body clothing  3  -CJ  3  -CJ  3  -CJ    Taking care of personal grooming (such as brushing teeth)  4  -CJ  4  -  4  -CJ    Eating meals  4  -  4  -  4  -    AM-PAC 6 Clicks Score (OT)  20  -  20  -  18  -       Functional Assessment    Outcome Measure Options  AM-PAC 6 Clicks Daily Activity (OT)  -  AM-PAC 6 Clicks Daily Activity (OT)  -  AM-PAC 6 Clicks Daily Activity (OT)  -    Row Name 06/20/21 1357             How much help from another is currently needed...    Putting on and taking off regular lower body clothing?  2  -CJ      Bathing (including washing, rinsing, and drying)  2  -CJ      Toileting (which includes using toilet bed pan or urinal)  3  -CJ      Putting on and taking off regular upper body clothing  3  -CJ      Taking care of personal grooming (such as brushing teeth)  4  -      Eating meals  4  -      AM-PAC 6 Clicks Score (OT)  18  [facility]         Functional Assessment    Outcome Measure Options  AM-PAC 6 Clicks Daily Activity (OT)  -        User Key  (r) = Recorded By, (t) = Taken By, (c) = Cosigned By    Initials Name Provider Type    Jacky Hawkins COTA/L Occupational Therapy Assistant          Time Calculation:   Time Calculation- OT     Row Name 06/23/21 1113 06/23/21 0754          Time Calculation- OT    OT Start Time  --  0754  -     OT Stop Time  --  0820  -     OT Time Calculation  (min)  --  26 min  -     Total Timed Code Minutes- OT  --  26 minute(s)  -     OT Received On  --  06/23/21  -        Timed Charges    02791 - OT Therapeutic Exercise Minutes  --  16  -CJ     54393 - Gait Training Minutes   26  -NW  --     79678 - OT Self Care/Mgmt Minutes  --  10  -CJ        Total Minutes    Timed Charges Total Minutes  26  -NW  26  -CJ      Total Minutes  26  -NW 26  -CJ       User Key  (r) = Recorded By, (t) = Taken By, (c) = Cosigned By    Initials Name Provider Type     Jacky Zhang COTA/L Occupational Therapy Assistant    NW Katie June, PTA Physical Therapy Assistant        Therapy Charges for Today     Code Description Service Date Service Provider Modifiers Qty    05978742700 HC OT SELF CARE/MGMT/TRAIN EA 15 MIN 6/22/2021 Jacky Zhang COTA/L GO 3    88615644534 HC OT THER PROC EA 15 MIN 6/23/2021 Jacky Zhang COTA/L GO 1    97219133987 HC OT SELF CARE/MGMT/TRAIN EA 15 MIN 6/23/2021 Jacky Zhang COTA/L GO 1               TAMIKO Alvarez  6/23/2021

## 2021-06-24 NOTE — PROGRESS NOTES
Nephrology ([facility]) Progress Note      Patient:  Mini Gentile  YOB: 1960  Date of Service: 6/24/2021  MRN: [id number]   Acct: [id number]   Primary Care Physician: Bharati Dahl APRN  Advance Directive:   Code Status and Medical Interventions:   Ordered at: 06/17/21 0220     Code Status:    CPR     Medical Interventions (Level of Support Prior to Arrest):    Full     Admit Date: 6/16/2021       Hospital Day: 8  Referring Provider: Tejinder Kent MD      Patient personally seen and examined.  Complete chart including Consults, Notes, Operative Reports, Labs, Cardiology, and Radiology studies reviewed as able.    Chief complaint: Abnormal labs.    Subjective:  Mini Gentile is a 61 y.o. female  whom we were consulted for end stage renal disease. Maintenance hemodialysis on Tuesday Thursday Saturday at [facility]. Fairly noncompliant with dialysis treatments. She was just discharged from [facility] on 6/8. Unknown if she has been going to HD treatments since then.  Presented to ER after she was noted to be confused at nursing home.  She was diagnosed with sepsis caused by C. difficile colitis.    This morning she is complaining of increasing diarrhea.    Currently seen on hemodialysis  Hemodialysis access: AV fistula  Hemodialysis: 3-1/2-hour  Ultrafiltration: 3500 cc  2K bath  Blood flow rate is 450 cc/min.      Allergies:  Bupropion, Chantix [varenicline], Gabapentin, Azithromycin, Levofloxacin, Penicillins, and Sulfa antibiotics    Home Meds:  Medications Prior to Admission   Medication Sig Dispense Refill Last Dose   • aspirin 81 MG EC tablet Take 81 mg by mouth Daily.   6/16/2021 at Unknown time   • atropine 1 % ophthalmic solution Administer 1 drop into the left eye Daily.   6/16/2021 at Unknown time   • brimonidine (ALPHAGAN) 0.2 % ophthalmic solution Administer 1 drop into the left eye 2 (two) times a day.   6/16/2021 at Unknown time   •  carvedilol (COREG) 6.25 MG tablet Take 6.25 mg by mouth 2 (Two) Times a Day With Meals.   6/16/2021 at Unknown time   • cholecalciferol (VITAMIN D3) 1000 units tablet Take 2,000 Units by mouth Daily.   6/16/2021 at Unknown time   • dorzolamide (TRUSOPT) 2 % ophthalmic solution Administer 1 drop into the left eye 2 (Two) Times a Day.   6/16/2021 at Unknown time   • epoetin vika-epbx (RETACRIT) 20755 UNIT/ML injection Inject 1 mL under the skin into the appropriate area as directed 3 (Three) Times a Week. Indications: ESRD on Dialysis 6.6 mL  6/16/2021 at Unknown time   • famotidine (PEPCID) 20 MG tablet Take 1 tablet by mouth Daily.   6/16/2021 at Unknown time   • Ferric Citrate 1  MG(Fe) tablet Take 1 tablet by mouth 3 (Three) Times a Day With Meals.   6/16/2021 at Unknown time   • fluticasone (FLONASE) 50 MCG/ACT nasal spray 2 sprays into the nostril(s) as directed by provider 2 (Two) Times a Day.   6/16/2021 at Unknown time   • insulin lispro (humaLOG) 100 UNIT/ML injection Inject under the skin TID AC as per sliding scale:  200-249= 2 units  250-299= 4 units  300-349= 6 units  350-399= 8 units  400-500= 10 units and call MD   6/16/2021 at Unknown time   • metoclopramide (REGLAN) 5 MG tablet Take 1 tablet by mouth 4 (Four) Times a Day Before Meals & at Bedtime.   6/16/2021 at Unknown time   • rosuvastatin (CRESTOR) 5 MG tablet Take 5 mg by mouth every night at bedtime.   6/15/2021 at Unknown time   • saccharomyces boulardii (FLORASTOR) 250 MG capsule Take 250 mg by mouth Daily.   6/16/2021 at Unknown time   • sertraline (ZOLOFT) 25 MG tablet Take 1 tablet by mouth Daily.   6/16/2021 at Unknown time   • timolol (TIMOPTIC) 0.5 % ophthalmic solution Administer 1 drop into the left eye Every Morning.  12 6/16/2021 at Unknown time   • acetaminophen (TYLENOL) 325 MG tablet Take 650 mg by mouth Every 4 (Four) Hours As Needed for Mild Pain  or Fever.   Unknown at Unknown time   • albuterol sulfate  (90 Base)  MCG/ACT inhaler Inhale 2 puffs Every 4 (Four) Hours As Needed for Wheezing. 18 g 0 Unknown at Unknown time   • ipratropium-albuterol (DUO-NEB) 0.5-2.5 mg/3 ml nebulizer Take 3 mL by nebulization Every 4 (Four) Hours As Needed for Wheezing or Shortness of Air.   Unknown at Unknown time   • ondansetron (ZOFRAN) 4 MG tablet Take 1 tablet by mouth Every 6 (Six) Hours As Needed for Nausea or Vomiting. 24 tablet 2 Unknown at Unknown time       Medicines:  Current Facility-Administered Medications   Medication Dose Route Frequency Provider Last Rate Last Admin   • acetaminophen (TYLENOL) tablet 650 mg  650 mg Oral Q4H PRN Bni Guy DO   650 mg at 06/23/21 2210    Or   • acetaminophen (TYLENOL) suppository 650 mg  650 mg Rectal Q4H PRN Bin Guy DO       • aspirin EC tablet 81 mg  81 mg Oral Daily Bin Guy DO   81 mg at 06/24/21 0853   • atropine 1 % ophthalmic solution 1 drop  1 drop Left Eye Daily Bin Guy DO   1 drop at 06/23/21 0812   • brimonidine (ALPHAGAN) 0.2 % ophthalmic solution 1 drop  1 drop Left Eye BID PRN Bin Guy DO   1 drop at 06/24/21 0854   • carvedilol (COREG) tablet 6.25 mg  6.25 mg Oral BID With Meals Bin Guy DO   6.25 mg at 06/24/21 0853   • cholecalciferol (VITAMIN D3) tablet 2,000 Units  2,000 Units Oral Daily Bin Guy DO   2,000 Units at 06/24/21 0853   • dextrose (D50W) 25 g/ 50mL Intravenous Solution 25 g  25 g Intravenous Q15 Min PRN Bin Guy DO       • dextrose (GLUTOSE) oral gel 15 g  15 g Oral Q15 Min PRN Bin Guy DO       • epoetin vika-epbx (RETACRIT) injection 10,000 Units  10,000 Units Subcutaneous Once per day on Tue Thu Sat Bin Guy DO   10,000 Units at 06/22/21 0840   • famotidine (PEPCID) tablet 20 mg  20 mg Oral Once per day on Tue Thu Sat Bin Guy DO   20 mg at 06/22/21 1705   • Ferric Citrate tablet 840 mg  4 tablet Oral TID With Meals Bin Guy DO   840 mg at 06/24/21 0853   •  fluticasone (FLONASE) 50 MCG/ACT nasal spray 2 spray  2 spray Nasal BID Bin Guy DO   2 spray at 06/24/21 0853   • glucagon (human recombinant) (GLUCAGEN DIAGNOSTIC) injection 1 mg  1 mg Subcutaneous Q15 Min PRN Bin Guy DO       • insulin lispro (humaLOG) injection 2-7 Units  2-7 Units Subcutaneous TID AC Bin Guy DO   2 Units at 06/24/21 0853   • ipratropium-albuterol (DUO-NEB) nebulizer solution 3 mL  3 mL Nebulization Q4H PRN Bin Guy DO   3 mL at 06/23/21 0201   • lactobacillus acidophilus (RISAQUAD) capsule 1 capsule  1 capsule Oral Daily Bin Guy DO   1 capsule at 06/24/21 0853   • meropenem (MERREM) 500mg/100 mL 0.9% NS IVPB (mbp)  500 mg Intravenous Q24H Fabio Glover MD 0 mL/hr at 06/22/21 1713 500 mg at 06/23/21 1310   • ondansetron (ZOFRAN) tablet 4 mg  4 mg Oral Q6H PRN Bin Guy DO        Or   • ondansetron (ZOFRAN) injection 4 mg  4 mg Intravenous Q6H PRN Bin Guy DO       • pravastatin (PRAVACHOL) tablet 10 mg  10 mg Oral Nightly Bin Guy DO   10 mg at 06/23/21 2005   • sertraline (ZOLOFT) tablet 25 mg  25 mg Oral Daily Bin Guy DO   25 mg at 06/24/21 0853   • sodium chloride 0.9 % bolus 100 mL  100 mL Intravenous PRN Bin Guy DO       • sodium chloride 0.9 % bolus 100 mL  100 mL Intravenous PRN Adi Gonzalez MD       • sodium chloride 0.9 % flush 10 mL  10 mL Intravenous PRN Bin Guy DO       • sodium chloride 0.9 % flush 10 mL  10 mL Intravenous Q12H Bin Guy DO   10 mL at 06/23/21 2005   • sodium chloride 0.9 % flush 10 mL  10 mL Intravenous PRN Bin Guy DO       • timolol (TIMOPTIC) 0.5 % ophthalmic solution 1 drop  1 drop Left Eye Daily Bin Guy DO   1 drop at 06/24/21 0854   • vancomycin oral solution reconstituted 125 mg  125 mg Oral Q6H Bin Guy DO   125 mg at 06/24/21 0549       Past Medical History:  Past Medical History:   Diagnosis Date   •  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    • Chronic otitis media    • Diabetes (CMS/[facility])    • End stage renal disease on dialysis (CMS/[facility])    • Eustachian tube dysfunction    • GERD (gastroesophageal reflux disease)    • Glaucoma    • HTN (hypertension)    • Hyperlipidemia    • Mucoid otitis media    • Noncompliance of patient with renal dialysis (CMS/[facility])    • Tobacco abuse        Past Surgical History:  Past Surgical History:   Procedure Laterality Date   • ARTERIOVENOUS FISTULA     • CATARACT EXTRACTION WITH INTRAOCULAR LENS IMPLANT     •  SECTION     • CHOLECYSTECTOMY     • MYRINGOTOMY W/ TUBES Bilateral    • MYRINGOTOMY W/ TUBES Right    • TUBAL ABDOMINAL LIGATION         Family History  Family History   Problem Relation Age of Onset   • Heart disease Mother    • Diabetes Father    • Colon cancer Neg Hx    • Colon polyps Neg Hx        Social History  Social History     Socioeconomic History   • Marital status: Single     Spouse name: Not on file   • Number of children: Not on file   • Years of education: Not on file   • Highest education level: Not on file   Tobacco Use   • Smoking status: Current Every Day Smoker     Packs/day: 2.00     Years: 6.00     Pack years: 12.00     Types: Cigarettes   • Smokeless tobacco: Never Used   Vaping Use   • Vaping Use: Never used   Substance and Sexual Activity   • Alcohol use: No   • Drug use: No   • Sexual activity: Defer         Review of Systems:  History obtained from chart review and the patient  General ROS: No fever or chills  Respiratory ROS: No cough, shortness of breath, wheezing  Cardiovascular ROS: No chest pain or palpitations  Gastrointestinal ROS: No abdominal pain or melena  Genito-Urinary ROS: No dysuria or hematuria    Objective:  Patient Vitals for the past 24 hrs:   BP Temp Temp src Pulse Resp SpO2   21 0738 165/68 97.4 °F (36.3 °C) -- 77 16 94 %   21 2116 138/60 98.2 °F (36.8 °C) Oral 71 16 --     No intake or output data in  the 24 hours ending 06/24/21 1031  General: awake/alert   HEENT: Normocephalic atraumatic head.  Neck: Supple with no JVD or carotid bruits.  Chest:  clear to auscultation bilaterally without respiratory distress  CVS: regular rate and rhythm  Abdominal: soft, nontender, positive bowel sounds  Extremities: no cyanosis or edema  Skin: warm and dry without rash      Labs:  Results from last 7 days   Lab Units 06/24/21  0356 06/22/21  0436 06/19/21  0550   WBC 10*3/mm3 6.62 5.35 7.47   HEMOGLOBIN g/dL 11.0* 10.4* 9.2*   HEMATOCRIT % 36.9 34.7 30.1*   PLATELETS 10*3/mm3 307 195 121*         Results from last 7 days   Lab Units 06/24/21  0356 06/22/21  0436 06/21/21  0551   SODIUM mmol/L 129* 131* 133*   POTASSIUM mmol/L 4.0 3.9 4.0   CHLORIDE mmol/L 88* 92* 94*   CO2 mmol/L 29.0 28.0 28.0   BUN mg/dL 21 28* 22   CREATININE mg/dL 3.69* 4.09* 3.59*   CALCIUM mg/dL 9.3 8.9 9.1   GLUCOSE mg/dL 136* 178* 165*       Radiology:   Imaging Results (Last 72 Hours)     ** No results found for the last 72 hours. **          Culture:  Blood Culture   Date Value Ref Range Status   06/19/2021 No growth at less than 24 hours  Preliminary   06/16/2021 Pseudomonas aeruginosa (C)  Preliminary     Comment:     Refer to previous blood culture collected 6/16/2021 at 1840 for VICKEY's.     06/16/2021 Pseudomonas aeruginosa (C)  Preliminary     Urine Culture   Date Value Ref Range Status   06/16/2021 >100,000 CFU/mL Mixed Janeth Isolated  Final         Assessment   1.  End-stage renal disease/seen on hemodialysis..  2.  Type II diabetic nephropathy.  3.  C. difficile colitis.  4.  Chronic obstructive pulmonary disease/active tobacco use.  5.  Anemia of chronic kidney disease.  6.  Noncompliant.  7.  Metabolic encephalopathy improved.  8.  Hyponatremia.       Plan:  1.  Tolerating dialysis well.  2.  P.o. vancomycin.  3.  Continue ELVIS.  4.  Nursing home placement soon.        Jose Caballero MD  6/24/2021  10:31 CDT

## 2021-06-24 NOTE — PLAN OF CARE
Goal Outcome Evaluation:  Plan of Care Reviewed With: patient        Progress: no change  Outcome Summary: Pt alert and oriented x4.  On call light very frequently.   IID.  Oral vancomycin continues.  Has had 4 bm's that were soft, not formed so far this shift.  Medicated with tylenol for c/o HA.

## 2021-06-24 NOTE — DISCHARGE SUMMARY
[facility] Medicine Services  DISCHARGE SUMMARY       Date of Admission: 6/16/2021  Date of Discharge:  6/24/2021  Primary Care Physician: Bharati Dahl APRN    Discharge Diagnoses:  Active Hospital Problems    Diagnosis    • **C. difficile diarrhea    • Bacteremia due to Pseudomonas    • Diarrhea    • Hypokalemia    • Altered mental status    • DM2 (diabetes mellitus, type 2) (CMS/Formerly [facility])    • End stage renal failure on dialysis (CMS/Formerly [facility])    • Diabetes (CMS/Formerly [facility])    • HTN (hypertension)          Presenting Problem/History of Present Illness:  Altered mental status, unspecified altered mental status type [R41.82]         Hospital Course    She is 61 y.o. female  known to me from a prior hospitalization where she presented with altered mental status.  She is an end-stage renal disease patient on dialysis every Tuesday Thursday and Saturday.  She carries history of diabetes mellitus, hypertension.  In her last hospitalization she stayed from June 1 through June 8.      She was readmitted on June 16 with altered mental status, hypotension, diarrhea. Patient reportedly found laying face down in her bed with confusion.  Pertinent findings include a procalcitonin of 12.5, potassium of 3.1, mildly elevated lactic acid, macrocytic anemia, flat trend of troponin, hypoxia per ABG.  Her head CT scan showed no acute intracranial abnormality and stable chronic changes.    She was seen in consult by Dr. Glover.  Patient found with Pseudomonas bacteremia and C. difficile associated diarrhea.  He recommended 5 day course of meropenem.  It is suspected that the bacteremia was transient and does not feel extended course of treatment will be necessary.  He recommended a truncated antibiotic course would be preferable given her C. Difficile.    Today is the expected end date for Merrem.  He further recommend continuing oral vancomycin through June 28, 2021.        She was dialyzed per her maintenance  dialysis schedule with the supervision of nephrology service.    I saw her during dialysis.  She said that she is doing better.  She also mentioned to me that she only had one episode of bowel movement which is starting to form.  She expressed readiness to be discharged from the hospital.    Lupis from physical therapy approached me regarding a prior fall and broken right shoulder/humerus.  I do not have any details of this but reviewing x-ray from April 1 it was demonstrated that she had new metal surgical neck fracture with fracture line extension into the humeral head greater and lesser tuberosities.  It also mentioned that the head remains in appropriate articulation with the glenoid.  No evidence of joint dislocation or subluxation.  When I saw her at dialysis unit I noticed that the blood pressure cuff is wrapped around her arm.  She did not complain of any pain.  There is no gross tenderness on palpation.  Patient is advised to follow-up with her orthopod.  It is my understanding that patient had not been compliant with her follow-up.      Patient is felt to be at her best condition and medically appropriate for discharge.  Procedures Performed: None    Consults:   Dr. Belen Caballero  Pertinent Test Results:   Lab Results (last 7 days)     Procedure Component Value Units Date/Time    POC Glucose Once [[id number]]  (Abnormal) Collected: 06/24/21 0754    Specimen: Blood Updated: 06/24/21 0805     Glucose 158 mg/dL      Comment: : [id number] Karena TranMeter ID: [id number]       Blood Culture With SVETLANA - Blood, Arm, Right [[id number]] Collected: 06/19/21 0550    Specimen: Blood from Arm, Right Updated: 06/24/21 0645     Blood Culture No growth at 5 days    Basic Metabolic Panel [[id number]]  (Abnormal) Collected: 06/24/21 0356    Specimen: Blood Updated: 06/24/21 0546     Glucose 136 mg/dL      BUN 21 mg/dL      Creatinine 3.69 mg/dL      Sodium 129 mmol/L      Potassium 4.0 mmol/L      Chloride 88 mmol/L       CO2 29.0 mmol/L      Calcium 9.3 mg/dL      eGFR   Amer --     Comment: <15 Indicative of kidney failure.        eGFR Non African Amer 12 mL/min/1.73      Comment: <15 Indicative of kidney failure.        BUN/Creatinine Ratio 5.7     Anion Gap 12.0 mmol/L     Narrative:      GFR Normal >60  Chronic Kidney Disease <60  Kidney Failure <15      CBC & Differential [[id number]]  (Abnormal) Collected: 06/24/21 0356    Specimen: Blood Updated: 06/24/21 0529    Narrative:      The following orders were created for panel order CBC & Differential.  Procedure                               Abnormality         Status                     ---------                               -----------         ------                     CBC Auto Differential[[id number]]        Abnormal            Final result                 Please view results for these tests on the individual orders.    CBC Auto Differential [[id number]]  (Abnormal) Collected: 06/24/21 0356    Specimen: Blood Updated: 06/24/21 0529     WBC 6.62 10*3/mm3      RBC 3.66 10*6/mm3      Hemoglobin 11.0 g/dL      Hematocrit 36.9 %      .8 fL      MCH 30.1 pg      MCHC 29.8 g/dL      RDW 18.1 %      RDW-SD 63.7 fl      MPV 9.5 fL      Platelets 307 10*3/mm3      Neutrophil % 72.6 %      Lymphocyte % 15.3 %      Monocyte % 8.0 %      Eosinophil % 1.5 %      Basophil % 0.6 %      Immature Grans % 2.0 %      Neutrophils, Absolute 4.81 10*3/mm3      Lymphocytes, Absolute 1.01 10*3/mm3      Monocytes, Absolute 0.53 10*3/mm3      Eosinophils, Absolute 0.10 10*3/mm3      Basophils, Absolute 0.04 10*3/mm3      Immature Grans, Absolute 0.13 10*3/mm3      nRBC 0.6 /100 WBC     POC Glucose Once [[id number]]  (Abnormal) Collected: 06/23/21 2003    Specimen: Blood Updated: 06/23/21 2014     Glucose 131 mg/dL      Comment: : [id number] Cyrus Skinner  LMeter ID: [id number]       POC Glucose Once [[id number]]  (Abnormal) Collected: 06/23/21 1714    Specimen: Blood Updated:  06/23/21 1725     Glucose 182 mg/dL      Comment: : [id number] Bernardo Daisy  LMeter ID: [id number]       POC Glucose Once [[id number]]  (Abnormal) Collected: 06/23/21 1150    Specimen: Blood Updated: 06/23/21 1202     Glucose 139 mg/dL      Comment: : [id number] Bernardo Daisy  LMeter ID: [id number]       POC Glucose Once [[id number]]  (Abnormal) Collected: 06/23/21 0746    Specimen: Blood Updated: 06/23/21 0757     Glucose 212 mg/dL      Comment: : [id number] Bernardo Daisy  LMeter ID: [id number]       POC Glucose Once [[id number]]  (Normal) Collected: 06/22/21 1700    Specimen: Blood Updated: 06/22/21 1721     Glucose 101 mg/dL      Comment: : [id number] Rosado PamelaMeter ID: [id number]       POC Glucose Once [[id number]]  (Abnormal) Collected: 06/22/21 1351    Specimen: Blood Updated: 06/22/21 1412     Glucose 170 mg/dL      Comment: : [id number] Rosado PamelaMeter ID: [id number]       POC Glucose Once [[id number]]  (Abnormal) Collected: 06/22/21 0818    Specimen: Blood Updated: 06/22/21 0839     Glucose 186 mg/dL      Comment: : [id number] Rosado PamelaMeter ID: [id number]       Basic Metabolic Panel [[id number]]  (Abnormal) Collected: 06/22/21 0436    Specimen: Blood Updated: 06/22/21 0510     Glucose 178 mg/dL      BUN 28 mg/dL      Creatinine 4.09 mg/dL      Sodium 131 mmol/L      Potassium 3.9 mmol/L      Chloride 92 mmol/L      CO2 28.0 mmol/L      Calcium 8.9 mg/dL      eGFR   Amer --     Comment: <15 Indicative of kidney failure.        eGFR Non African Amer 11 mL/min/1.73      Comment: <15 Indicative of kidney failure.        BUN/Creatinine Ratio 6.8     Anion Gap 11.0 mmol/L     Narrative:      GFR Normal >60  Chronic Kidney Disease <60  Kidney Failure <15      CBC & Differential [[id number]]  (Abnormal) Collected: 06/22/21 0436    Specimen: Blood Updated: 06/22/21 0458    Narrative:      The following orders were created for panel order CBC & Differential.  Procedure                                Abnormality         Status                     ---------                               -----------         ------                     CBC Auto Differential[[id number]]        Abnormal            Final result                 Please view results for these tests on the individual orders.    CBC Auto Differential [[id number]]  (Abnormal) Collected: 06/22/21 0436    Specimen: Blood Updated: 06/22/21 0458     WBC 5.35 10*3/mm3      RBC 3.48 10*6/mm3      Hemoglobin 10.4 g/dL      Hematocrit 34.7 %      MCV 99.7 fL      MCH 29.9 pg      MCHC 30.0 g/dL      RDW 17.0 %      RDW-SD 60.2 fl      MPV 9.3 fL      Platelets 195 10*3/mm3      Neutrophil % 64.8 %      Lymphocyte % 15.1 %      Monocyte % 12.3 %      Eosinophil % 2.4 %      Basophil % 0.7 %      Neutrophils, Absolute 3.46 10*3/mm3      Lymphocytes, Absolute 0.81 10*3/mm3      Monocytes, Absolute 0.66 10*3/mm3      Eosinophils, Absolute 0.13 10*3/mm3      Basophils, Absolute 0.04 10*3/mm3     POC Glucose Once [[id number]]  (Abnormal) Collected: 06/21/21 1712    Specimen: Blood Updated: 06/21/21 1723     Glucose 150 mg/dL      Comment: : [id number] Vicente Tsang ID: [id number]       POC Glucose Once [[id number]]  (Abnormal) Collected: 06/21/21 1146    Specimen: Blood Updated: 06/21/21 1211     Glucose 174 mg/dL      Comment: : [id number] Gurdeep KayMeter ID: [id number]       POC Glucose Once [[id number]]  (Abnormal) Collected: 06/21/21 0810    Specimen: Blood Updated: 06/21/21 0838     Glucose 151 mg/dL      Comment: : [id number] Gurdeep FuentesothyMeter ID: [id number]       Basic Metabolic Panel [[id number]]  (Abnormal) Collected: 06/21/21 0551    Specimen: Blood Updated: 06/21/21 0642     Glucose 165 mg/dL      BUN 22 mg/dL      Creatinine 3.59 mg/dL      Sodium 133 mmol/L      Potassium 4.0 mmol/L      Chloride 94 mmol/L      CO2 28.0 mmol/L      Calcium 9.1 mg/dL      eGFR   Amer --     Comment: <15 Indicative of kidney failure.         eGFR Non African Amer 13 mL/min/1.73      Comment: <15 Indicative of kidney failure.        BUN/Creatinine Ratio 6.1     Anion Gap 11.0 mmol/L     Narrative:      GFR Normal >60  Chronic Kidney Disease <60  Kidney Failure <15      POC Glucose Once [[id number]]  (Normal) Collected: 06/20/21 1655    Specimen: Blood Updated: 06/20/21 1706     Glucose 110 mg/dL      Comment: : [id number] Oracio Mary  GMeter ID: [id number]       POC Glucose Once [[id number]]  (Abnormal) Collected: 06/20/21 1223    Specimen: Blood Updated: 06/20/21 1234     Glucose 159 mg/dL      Comment: : [id number] Oracio Mary  GMeter ID: [id number]       POC Glucose Once [[id number]]  (Abnormal) Collected: 06/20/21 0731    Specimen: Blood Updated: 06/20/21 0742     Glucose 156 mg/dL      Comment: : [id number] Oracio Mary  GMeter ID: [id number]       Basic Metabolic Panel [[id number]]  (Abnormal) Collected: 06/20/21 0625    Specimen: Blood Updated: 06/20/21 0658     Glucose 181 mg/dL      BUN 16 mg/dL      Creatinine 2.43 mg/dL      Sodium 132 mmol/L      Potassium 4.2 mmol/L      Chloride 94 mmol/L      CO2 25.0 mmol/L      Calcium 9.2 mg/dL      eGFR Non African Amer 20 mL/min/1.73      BUN/Creatinine Ratio 6.6     Anion Gap 13.0 mmol/L     Narrative:      GFR Normal >60  Chronic Kidney Disease <60  Kidney Failure <15      Blood Culture - Blood, Arm, Right [[id number]]  (Abnormal) Collected: 06/16/21 1749    Specimen: Blood from Arm, Right Updated: 06/20/21 0615     Blood Culture Pseudomonas aeruginosa     Comment: Refer to previous blood culture collected 6/16/2021 at 1840 for VICKEY's.          Isolated from Aerobic and Anaerobic Bottles     Gram Stain Aerobic Bottle Gram negative bacilli      Anaerobic Bottle Gram negative bacilli    Blood Culture - Blood, Arm, Right [[id number]]  (Abnormal)  (Susceptibility) Collected: 06/16/21 1740    Specimen: Blood from Arm, Right Updated: 06/20/21 0614     Blood Culture Pseudomonas aeruginosa      Isolated from Aerobic and Anaerobic Bottles     Gram Stain Anaerobic Bottle Gram negative bacilli      Aerobic Bottle Gram negative bacilli    Susceptibility      Pseudomonas aeruginosa      VICKEY      Cefepime Resistant      Ceftazidime Resistant      Ciprofloxacin Susceptible      Levofloxacin Susceptible      Meropenem Susceptible  [1]       Piperacillin + Tazobactam Intermediate  [1]               [1]  Appended report. These results have been appended to a previously preliminary verified report.          Linear View                   POC Glucose Once [[id number]]  (Abnormal) Collected: 06/20/21 0034    Specimen: Blood Updated: 06/20/21 0056     Glucose 190 mg/dL      Comment: : [id number] Billy AlmanzaipMeter ID: [id number]       POC Glucose Once [[id number]]  (Abnormal) Collected: 06/19/21 1655    Specimen: Blood Updated: 06/19/21 1706     Glucose 131 mg/dL      Comment: : [id number] Neftali CaraMeter ID: [id number]       Manual Differential [[id number]]  (Abnormal) Collected: 06/19/21 0550    Specimen: Blood Updated: 06/19/21 0735     Neutrophil % 86.0 %      Lymphocyte % 5.0 %      Monocyte % 6.0 %      Basophil % 1.0 %      Bands %  2.0 %      Neutrophils Absolute 6.57 10*3/mm3      Lymphocytes Absolute 0.37 10*3/mm3      Monocytes Absolute 0.45 10*3/mm3      Basophils Absolute 0.07 10*3/mm3      nRBC 2.0 /100 WBC      Anisocytosis Slight/1+     Hypochromia Slight/1+     Polychromasia Slight/1+     WBC Morphology Normal     Platelet Estimate Decreased    CBC & Differential [[id number]]  (Abnormal) Collected: 06/19/21 0550    Specimen: Blood Updated: 06/19/21 0735    Narrative:      The following orders were created for panel order CBC & Differential.  Procedure                               Abnormality         Status                     ---------                               -----------         ------                     CBC Auto Differential[[id number]]        Abnormal            Final result                  Please view results for these tests on the individual orders.    CBC Auto Differential [[id number]]  (Abnormal) Collected: 06/19/21 0550    Specimen: Blood Updated: 06/19/21 0735     WBC 7.47 10*3/mm3      RBC 3.01 10*6/mm3      Hemoglobin 9.2 g/dL      Hematocrit 30.1 %      .0 fL      MCH 30.6 pg      MCHC 30.6 g/dL      RDW 16.6 %      RDW-SD 60.4 fl      MPV 9.8 fL      Platelets 121 10*3/mm3     Narrative:      The previously reported component NRBC is no longer being reported. Previous result was 0.8 /100 WBC (Reference Range: 0.0-0.2 /100 WBC) on 6/19/2021 at [facility] [facility].    Basic Metabolic Panel [[id number]]  (Abnormal) Collected: 06/19/21 0550    Specimen: Blood Updated: 06/19/21 0659     Glucose 124 mg/dL      BUN 30 mg/dL      Creatinine 3.21 mg/dL      Sodium 135 mmol/L      Potassium 3.5 mmol/L      Chloride 95 mmol/L      CO2 27.0 mmol/L      Calcium 9.5 mg/dL      eGFR Non African Amer 15 mL/min/1.73      BUN/Creatinine Ratio 9.3     Anion Gap 13.0 mmol/L     Narrative:      GFR Normal >60  Chronic Kidney Disease <60  Kidney Failure <15      POC Glucose Once [[id number]]  (Abnormal) Collected: 06/18/21 2349    Specimen: Blood Updated: 06/19/21 0010     Glucose 145 mg/dL      Comment: : [id number] Billy DodgeMeter ID: [id number]       POC Glucose Once [[id number]]  (Abnormal) Collected: 06/18/21 1728    Specimen: Blood Updated: 06/18/21 1739     Glucose 143 mg/dL      Comment: : [id number] Sarah PenalozaonMeter ID: [id number]       POC Glucose Once [[id number]]  (Abnormal) Collected: 06/18/21 1326    Specimen: Blood Updated: 06/18/21 1347     Glucose 176 mg/dL      Comment: : [id number] Sarah PenalozaonMeter ID: [id number]       POC Glucose Once [[id number]]  (Normal) Collected: 06/18/21 0632    Specimen: Blood Updated: 06/18/21 0643     Glucose 113 mg/dL      Comment: : [id number] Blake ToneyMeter ID: [id number]       Manual Differential [[id number]]  (Abnormal) Collected: 06/18/21  0250    Specimen: Blood Updated: 06/18/21 0329     Neutrophil % 90.0 %      Lymphocyte % 4.0 %      Monocyte % 5.0 %      Eosinophil % 1.0 %      Neutrophils Absolute 7.22 10*3/mm3      Lymphocytes Absolute 0.32 10*3/mm3      Monocytes Absolute 0.40 10*3/mm3      Eosinophils Absolute 0.08 10*3/mm3      Hypochromia Slight/1+     Macrocytes Slight/1+     WBC Morphology Normal     Platelet Estimate Decreased    CBC & Differential [[id number]]  (Abnormal) Collected: 06/18/21 0250    Specimen: Blood Updated: 06/18/21 0329    Narrative:      The following orders were created for panel order CBC & Differential.  Procedure                               Abnormality         Status                     ---------                               -----------         ------                     CBC Auto Differential[[id number]]        Abnormal            Final result                 Please view results for these tests on the individual orders.    CBC Auto Differential [[id number]]  (Abnormal) Collected: 06/18/21 0250    Specimen: Blood Updated: 06/18/21 0329     WBC 8.02 10*3/mm3      RBC 2.91 10*6/mm3      Hemoglobin 8.9 g/dL      Hematocrit 29.4 %      .0 fL      MCH 30.6 pg      MCHC 30.3 g/dL      RDW 17.2 %      RDW-SD 63.9 fl      MPV 9.7 fL      Platelets 126 10*3/mm3      nRBC 0.4 /100 WBC     Basic Metabolic Panel [[id number]]  (Abnormal) Collected: 06/18/21 0250    Specimen: Blood Updated: 06/18/21 0316     Glucose 123 mg/dL      BUN 16 mg/dL      Creatinine 2.40 mg/dL      Sodium 138 mmol/L      Potassium 3.8 mmol/L      Comment: Slight hemolysis detected by analyzer. Results may be affected.        Chloride 98 mmol/L      CO2 29.0 mmol/L      Calcium 9.0 mg/dL      eGFR Non African Amer 20 mL/min/1.73      BUN/Creatinine Ratio 6.7     Anion Gap 11.0 mmol/L     Narrative:      GFR Normal >60  Chronic Kidney Disease <60  Kidney Failure <15      Magnesium [[id number]]  (Normal) Collected: 06/18/21 0250    Specimen: Blood  Updated: 06/18/21 0316     Magnesium 1.9 mg/dL     POC Glucose Once [[id number]]  (Normal) Collected: 06/18/21 0122    Specimen: Blood Updated: 06/18/21 0133     Glucose 116 mg/dL      Comment: : [id number] Blake Otto ID: [id number]           Imaging Results (All)     Procedure Component Value Units Date/Time    XR Femur 2 View Right [[id number]] Collected: 06/16/21 1933     Updated: 06/16/21 1937    Narrative:      EXAMINATION: XR FEMUR 2 VW RIGHT-     6/16/2021 6:40 PM CDT     HISTORY: rt leg pain, fall     Right femur, 4 views.     No femur fracture is seen.     The hip and knee are normal, to the extent visualized.     No soft tissue abnormality is seen.       Impression:      1. No acute bony abnormality.              This report was finalized on 06/16/2021 19:34 by Dr. Dharmesh Zuleta MD.    XR Pelvis 1 or 2 View [[id number]] Collected: 06/16/21 1931     Updated: 06/16/21 1936    Narrative:      EXAMINATION: XR PELVIS 1 OR 2 VW-     6/16/2021 6:40 PM CDT     HISTORY: fall, pain     Pelvis, one view.     Mildly displaced superior and inferior right pubic ramus fractures are  not acute and are seen on a CT exam from 6/1/2021.     . SI joints are symmetric.     No sacral fracture is seen.     No hip fracture or dislocation.     Summary:  1. Old fractures of the right superior and inferior pubic ramus.  2. No acute fracture is seen.  This report was finalized on 06/16/2021 19:33 by Dr. Dharmesh Zuleta MD.    XR Hand 3+ View Right [[id number]] Collected: 06/16/21 1930     Updated: 06/16/21 1934    Narrative:      EXAMINATION: XR HAND 3+ VW RIGHT-     6/16/2021 6:40 PM CDT     HISTORY: Fall injury. Right hand pain.     Right hand, 3 views.     Osteopenia.     Chronic appearing small soft tissue calcifications are seen adjacent to  the third and fourth MCP joints.     Metacarpals and fingers show no sign of acute fracture.     The distal aspect of the second finger and third finger are obscured by  a Sensor and  wire on some of the images.     Carpal bones align normally.     Summary:  1. No acute fracture is seen.     This report was finalized on 06/16/2021 19:31 by Dr. Dharmesh Zuleta MD.    XR Wrist 3+ View Right [[id number]] Collected: 06/16/21 1929     Updated: 06/16/21 1932    Narrative:      EXAMINATION: XR WRIST 3+ VW RIGHT-     6/16/2021 6:40 PM CDT     HISTORY: Fall injury. Right wrist pain.     Right wrist, 3 views.     Osteopenia.     Diffuse arterial calcification.     Intact distal radius and ulna.     Carpal bones align normally.     Proximal metacarpals are intact.     Summary:  1. No acute fracture.     This report was finalized on 06/16/2021 19:29 by Dr. Dharmesh Zuleta MD.    XR Chest 1 View [[id number]] Collected: 06/16/21 1927     Updated: 06/16/21 1932    Narrative:      EXAMINATION: XR CHEST 1 VW-     6/16/2021 6:40 PM CDT     HISTORY: fever, AMS     1 view chest x-ray compared with 6/7/2021.     Stable heart and mediastinum.  Aortic arch calcification.     Interval removal of a right jugular central line.     There is increased interstitial prominence compatible with interstitial  edema and early heart failure.     There is an unchanged appearance of a moderate right pleural effusion.     Nonhealed fracture of the right humeral neck.  This finding was demonstrated on a radiograph from 4/1/2021.     Summary:  1. Increased interstitial prominence compatible with interstitial edema  and early failure.  2. Unchanged or minimally decreased moderate right pleural effusion.  This report was finalized on 06/16/2021 19:28 by Dr. Dharmesh Zuleta MD.    CT Cervical Spine Without Contrast [[id number]] Collected: 06/16/21 1827     Updated: 06/16/21 1832    Narrative:      EXAMINATION: CT CERVICAL SPINE WO CONTRAST-      6/16/2021 6:13 PM CDT     HISTORY: Fall injury. Neck pain. Altered mental status.     In order to have a CT radiation dose as low as reasonably achievable  Automated Exposure Control was utilized for  "adjustment of the mA and/or  KV according to patient size.     DLP in mGycm= 593.     Noncontrast cervical spine CT.     Comparison is made with cervical spine CT imaging from 3/26/2021.     The patient was unable to cooperate and the images are affected by  motion.     Mild right convex curvature on the coronal sequence is negative and  probably is positional.     The lateral view shows appropriate lordotic curvature with no vertebral  body malalignment.     Prevertebral soft tissues are normal.     Facet joints align normally.     Summary:  1. No acute fracture is seen.                                   This report was finalized on 06/16/2021 18:29 by Dr. Dharmesh Zuleta MD.    CT Head Without Contrast [[id number]] Collected: 06/16/21 1826     Updated: 06/16/21 1830    Narrative:      EXAMINATION: CT HEAD WO CONTRAST-      6/16/2021 6:13 PM CDT     HISTORY: Altered mental status.     In order to have a CT radiation dose as low as reasonably achievable  Automated Exposure Control was utilized for adjustment of the mA and/or  KV according to patient size.     DLP in mGycm= 594.     Noncontrast head CT.     Comparison is made with a noncontrast head CT from 6/1/2021.     Axial, sagittal, and coronal noncontrast CT imaging of the head.     The visualized paranasal sinuses are clear.     The brain and ventricles have an age appropriate appearance.   There is no hemorrhage or mass-effect.   No acute infarction is seen.     No calvarial abnormality.       Impression:      1. No acute intracranial abnormality is seen.  2. Stable chronic change.                                         This report was finalized on 06/16/2021 18:27 by Dr. Dharmesh Zuleta MD.        Condition on Discharge:   Stable    Physical Exam on Discharge:  /68 (BP Location: Right arm, Patient Position: Sitting)   Pulse 77   Temp 97.4 °F (36.3 °C)   Resp 16   Ht 147.3 cm (58\")   Wt 64 kg (141 lb)   SpO2 94%   BMI 29.47 kg/m²   Physical Exam  She " is hemodynamically stable  Laying flat in bed while undergoing dialysis through left arm  Blood pressure cuff is on the right arm  No distress  States that she is doing well and looking forward to be discharged  Lungs are clear to auscultation with good air exchange,  S1-S2, regular rate and rhythm no gross murmur  Soft abdomen, nontender, no organomegaly, no guarding  No cyanosis or gross edema  Warm dry skin with good capillary refill time  Has a bruise on left elbow  No significant change from yesterday's exam  Discharge Disposition:  Skilled Nursing Facility (DC - External)    Discharge Medications:     Discharge Medications      New Medications      Instructions Start Date   vancomycin 50 MG/ML reconstituted solution oral solution reconstituted   125 mg, Oral, Every 6 Hours Scheduled         Continue These Medications      Instructions Start Date   acetaminophen 325 MG tablet  Commonly known as: TYLENOL   650 mg, Oral, Every 4 Hours PRN      aspirin 81 MG EC tablet   81 mg, Oral, Daily      atropine 1 % ophthalmic solution   1 drop, Left Eye, Daily      brimonidine 0.2 % ophthalmic solution  Commonly known as: ALPHAGAN   1 drop, Left Eye, 2 times daily      carvedilol 6.25 MG tablet  Commonly known as: COREG   6.25 mg, Oral, 2 Times Daily With Meals      cholecalciferol 25 MCG (1000 UT) tablet  Commonly known as: VITAMIN D3   2,000 Units, Oral, Daily      dorzolamide 2 % ophthalmic solution  Commonly known as: TRUSOPT   1 drop, Left Eye, 2 Times Daily      epoetin vika-epbx 94693 UNIT/ML injection  Commonly known as: RETACRIT   10,000 Units, Subcutaneous, 3 Times Weekly      famotidine 20 MG tablet  Commonly known as: PEPCID   20 mg, Oral, Daily      Ferric Citrate 1  MG(Fe) tablet   1 tablet, Oral, 3 Times Daily With Meals      fluticasone 50 MCG/ACT nasal spray  Commonly known as: FLONASE   2 sprays, Nasal, 2 Times Daily      insulin lispro 100 UNIT/ML injection  Commonly known as: humaLOG   Inject  under the skin TID AC as per sliding scale: 200-249= 2 units 250-299= 4 units 300-349= 6 units 350-399= 8 units 400-500= 10 units and call MD      ipratropium-albuterol 0.5-2.5 mg/3 ml nebulizer  Commonly known as: DUO-NEB   3 mL, Nebulization, Every 4 Hours PRN      lactobacillus acidophilus capsule capsule   1 capsule, Oral, Daily      ondansetron 4 MG tablet  Commonly known as: ZOFRAN   4 mg, Oral, Every 6 Hours PRN      pravastatin 10 MG tablet  Commonly known as: PRAVACHOL   10 mg, Oral, Nightly      sertraline 25 MG tablet  Commonly known as: ZOLOFT   25 mg, Oral, Daily      timolol 0.5 % ophthalmic solution  Commonly known as: TIMOPTIC   1 drop, Left Eye, Every Morning      Ventolin  (90 Base) MCG/ACT inhaler  Generic drug: albuterol sulfate HFA   2 puffs, Inhalation, Every 4 Hours PRN         Stop These Medications    metoclopramide 5 MG tablet  Commonly known as: REGLAN     rosuvastatin 5 MG tablet  Commonly known as: CRESTOR     saccharomyces boulardii 250 MG capsule  Commonly known as: FLORASTOR            Discharge Diet:   Diet Instructions     Diet: Consistent Carbohydrate, Renal      Discharge Diet:  Consistent Carbohydrate  Renal             Discharge Care Plan / Instructions:  Patient has been advised to follow-up with orthopod regarding the prior right humeral fracture.    Activity at Discharge:    Gradually resume activities as tolerated    Follow-up Appointments:   Skilled nursing facility provider within 2448 hrs.  Maintenance dialysis schedule  Nephrology per their discretion  Dr. Glover as needed or per his discretion  I requested the  to set an appointment to see her orthopod for follow-up.  Test Results Pending at Discharge:      Electronically signed by Sundeep Aldridge MD, 6/24/2021, 13:17 CDT.    Time: Greater than 30 minutes      Part of this note may be an electronic transcription/translation of spoken language to printed text using the Dragon Dictation  System.

## 2021-06-24 NOTE — PLAN OF CARE
Problem: Adult Inpatient Plan of Care  Goal: Plan of Care Review  Outcome: Ongoing, Progressing  Flowsheets (Taken 6/24/2021 0043)  Progress: improving  Plan of Care Reviewed With: patient  Outcome Summary: Pt. had no issues with ue exs, transfers bed-chair and reviewing home safety issues for d/c home when ready!   Goal Outcome Evaluation:  Plan of Care Reviewed With: patient        Progress: improving  Outcome Summary: Pt. had no issues with ue exs, transfers bed-chair and reviewing home safety issues for d/c home when ready!

## 2021-06-24 NOTE — THERAPY TREATMENT NOTE
Acute Care - Occupational Therapy Treatment Note  [facility]     Patient Name: Mini Gentile  : 1960  MRN: [id number]  Today's Date: 2021             Admit Date: 2021       ICD-10-CM ICD-9-CM   1. Altered mental status, unspecified altered mental status type  R41.82 780.97   2. Hypoxia  R09.02 799.02   3. Elevated troponin  R77.8 790.6   4. End stage renal disease (CMS/HCC)  N18.6 585.6   5. Anemia, unspecified type  D64.9 285.9   6. Hypotension, unspecified hypotension type  I95.9 458.9   7. Fall, initial encounter  W19.XXXA E888.9   8. Dysphagia, unspecified type  R13.10 787.20   9. Decreased activities of daily living (ADL)  Z78.9 V49.89   10. 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   • Eustachian tube dysfunction   • Chronic otitis media   • Pneumonia of left upper lobe due to Streptococcus (CMS/[facility])   • End stage renal failure on dialysis (CMS/[facility])   • Diabetes (CMS/[facility])   • Glaucoma   • HTN (hypertension)   • Proteinuria   • DM2 (diabetes mellitus, type 2) (CMS/[facility])   • Chronic anemia   • Acute pulmonary edema (CMS/[facility])   • Non-compliance with renal dialysis (CMS/[facility])   • Elevated troponin due to ESRD    • Hyperkalemia   • Encephalopathy, metabolic, due to uremia   • High anion gap metabolic acidosis due to uremia   • Respiratory distress   • Acute diastolic heart failure (CMS/[facility])   • Lymph node enlargement, left axillary measuring 1.1 cm -follow-up for primary care   • Nausea vomiting and diarrhea   • Acute diastolic heart failure (CMS/HCC)   • Diabetes mellitus with ophthalmic complication (CMS/[facility])   • Tobacco abuse   • Urinary tract infection with hematuria   • Pneumonia due to infectious organism   • Abnormal urine findings   • Closed fracture of right proximal humerus   • Hyponatremia   • Hypothyroidism (acquired)   • Forehead laceration secondary to fall   • Heme positive stool   • Dialysis  AV fistula malfunction, initial encounter (CMS/[facility])   • Aspiration into airway   • Bronchospasm, acute   • Altered mental status   • Protein-calorie malnutrition (CMS/[facility])   • Pupil irregular   • Sepsis (CMS/[facility])   • Hypoglycemia   • Hyponatremia   • Diarrhea   • Hypokalemia   • C. 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    • Chronic otitis media    • Diabetes (CMS/[facility])    • End stage renal disease on dialysis (CMS/[facility])    • Eustachian tube dysfunction    • GERD (gastroesophageal reflux disease)    • Glaucoma    • HTN (hypertension)    • Hyperlipidemia    • Mucoid otitis media    • Noncompliance of patient with renal dialysis (CMS/[facility])    • Tobacco abuse      Past Surgical History:   Procedure Laterality Date   • ARTERIOVENOUS FISTULA     • CATARACT EXTRACTION WITH INTRAOCULAR LENS IMPLANT     •  SECTION     • CHOLECYSTECTOMY     • MYRINGOTOMY W/ TUBES Bilateral    • MYRINGOTOMY W/ TUBES Right    • TUBAL ABDOMINAL LIGATION              OT ASSESSMENT FLOWSHEET (last 12 hours)      OT Evaluation and Treatment     Row Name 21 1102 21 0730                OT Time and Intention    Subjective Information  --  complains of;weakness;fatigue  -       Document Type  --  therapy note (daily note)  -       Mode of Treatment  --  occupational therapy  -       Patient Effort  --  good  -          General Information    Existing Precautions/Restrictions  --  fall;non-weight bearing NWB RUE!  -          Pain Assessment    Additional Documentation  --  Pain Scale: Word Pre/Post-Treatment (Group)  -          Pain Scale: Numbers Pre/Post-Treatment    Pain Intervention(s)  --  Repositioned;Rest  -          Pain Scale: Word Pre/Post-Treatment    Pain: Word Scale, Pretreatment  --  0 - no pain  -       Posttreatment Pain Rating  --  0 - no pain  -          Bed Mobility    Comment (Bed Mobility)   --  sitting in chair!  -          Functional Mobility    Functional Mobility- Ind. Level  supervision required;verbal cues required;contact guard assist  -  --       Functional Mobility- Device  -- HHA!  -  --       Functional Mobility-Distance (Feet)  6  -CJ  --          Transfers    Bed-Chair [place] (Transfers)  standby assist;supervision;contact guard  -  --       Sit-Stand [place] (Transfers)  -- HHA!  -  --          Motor Skills    Motor Skills  --  therapeutic exercise  -       Therapeutic Exercise  --  elbow/forearm  -          Elbow/Forearm (Therapeutic Exercise)    Elbow/Forearm (Therapeutic Exercise)  --  AROM (active range of motion);strengthening exercise  -       Elbow/Forearm AROM (Therapeutic Exercise)  --  bilateral;flexion;extension;sitting;10 repetitions  -       Elbow/Forearm Strengthening (Therapeutic Exercise)  --  bilateral;flexion;extension;sitting;2 lb free weight;3 lb free weight  -          Activities of Daily Living    Additional Documentation  --  -- reviewed homesafety issues for d/c home when ready!  -          Wound 04/04/21 0142 coccyx    Wound - Properties Group Placement Date: 04/04/21  -VT Placement Time: 0142  -VT Location: coccyx  -VT    Retired Wound - Properties Group Date first assessed: 04/04/21  -VT Time first assessed: 0142  -VT Location: coccyx  -VT       Positioning and Restraints    Pre-Treatment Position  --  in bed  -       Post Treatment Position  --  chair  -CJ       In Chair  --  reclined;sitting;call light within reach;encouraged to call for assist  -          Progress Summary (OT)    Progress Toward Functional Goals (OT)  --  progress toward functional goals is good  -       Barriers to Overall Progress (OT)  --  Fall/NWB RUE!  -       Impairments Still Limiting Function (OT)  --  Plan d/c!  -         User Key  (r) = Recorded By, (t) = Taken By, (c) = Cosigned By    Initials Name Effective Dates    CJ Jacky Zhang,  HOOD/L 06/16/21 Alyx Garcia RN 08/02/16 - 06/15/21         Occupational Therapy Education                 Title: PT OT SLP Therapies (In Progress)     Topic: Occupational Therapy (Done)     Point: ADL training (Done)     Description:   Instruct learner(s) on proper safety adaptation and remediation techniques during self care or transfers.   Instruct in proper use of assistive devices.              Learning Progress Summary           Patient Acceptance, E,TB, VU,DU,NR by  at 6/24/2021 0730    Comment: Pt. sitting eob, performed ue exs and transfered bed-chair, reviewed homesafety issues!    Acceptance, E,TB, VU,DU,NR by  at 6/23/2021 0754    Comment: Pt. performed ue exs and transfers bed-chair!    Acceptance, E, VU,DU,NR by  at 6/22/2021 0725    Comment: Pt. completed adl tasks with this hood/l assisting!    Acceptance, E,TB, VU,DU,NR by  at 6/20/2021 1357    Comment: Pt. performed adl shower/dressing with this hood/l assisting!    Acceptance, E, VU by TSERING at 6/18/2021 1104                   Point: Home exercise program (Done)     Description:   Instruct learner(s) on appropriate technique for monitoring, assisting and/or progressing therapeutic exercises/activities.              Learning Progress Summary           Patient Acceptance, E,TB, VU,DU,NR by  at 6/24/2021 0730    Comment: Pt. sitting eob, performed ue exs and transfered bed-chair, reviewed homesafety issues!    Acceptance, E,TB, VU,DU,NR by  at 6/23/2021 0754    Comment: Pt. performed ue exs and transfers bed-chair!    Acceptance, E,TB, VU,DU,NR by  at 6/21/2021 1000    Comment: Pt. performed ue exs with 2lb/ bar and 1lb. wrist wt!Elbow flex/ext only!                   Point: Precautions (Done)     Description:   Instruct learner(s) on prescribed precautions during self-care and functional transfers.              Learning Progress Summary           Patient Acceptance, E,TB, VU,DU,NR by  at 6/24/2021 0730    Comment: Pt.  sitting eob, performed ue exs and transfered bed-chair, reviewed homesafety issues!    Acceptance, E,TB, VU,DU,NR by  at 6/23/2021 0754    Comment: Pt. performed ue exs and transfers bed-chair!    Acceptance, E, VU,DU,NR by  at 6/22/2021 0725    Comment: Pt. completed adl tasks with this hood/l assisting!    Acceptance, E,TB, VU,DU,NR by  at 6/21/2021 1000    Comment: Pt. performed ue exs with 2lb/ bar and 1lb. wrist wt!Elbow flex/ext only!    Acceptance, E,TB, VU,DU,NR by  at 6/20/2021 1357    Comment: Pt. performed adl shower/dressing with this hood/l assisting!    Acceptance, E, VU by  at 6/18/2021 1104                   Point: Body mechanics (Done)     Description:   Instruct learner(s) on proper positioning and spine alignment during self-care, functional mobility activities and/or exercises.              Learning Progress Summary           Patient Acceptance, E,TB, VU,DU,NR by  at 6/24/2021 0730    Comment: Pt. sitting eob, performed ue exs and transfered bed-chair, reviewed homesafety issues!    Acceptance, E,TB, VU,DU,NR by  at 6/23/2021 0754    Comment: Pt. performed ue exs and transfers bed-chair!    Acceptance, E, VU,DU,NR by  at 6/22/2021 0725    Comment: Pt. completed adl tasks with this hood/l assisting!    Acceptance, E,TB, VU,DU,NR by  at 6/21/2021 1000    Comment: Pt. performed ue exs with 2lb/ bar and 1lb. wrist wt!Elbow flex/ext only!    Acceptance, E,TB, VU,DU,NR by  at 6/20/2021 1357    Comment: Pt. performed adl shower/dressing with this hood/l assisting!    Acceptance, E, VU by  at 6/18/2021 1104                               User Key     Initials Effective Dates Name Provider Type Discipline     06/16/21 -  Jacky Zhang HOOD/L Occupational Therapy Assistant OT     06/16/21 -  Chapman, Kami, OTR/L Occupational Therapist OT                  OT Recommendation and Plan     Progress Toward Functional Goals (OT): progress toward functional goals is good  Plan  of Care Review  Plan of Care Reviewed With: patient  Progress: improving  Outcome Summary: Pt. had no issues with ue exs, transfers bed-chair and reviewing home safety issues for d/c home when ready!  Plan of Care Reviewed With: patient  Outcome Summary: Pt. had no issues with ue exs, transfers bed-chair and reviewing home safety issues for d/c home when ready!    Outcome Measures     Row Name 06/24/21 0730 06/23/21 0754 06/22/21 0725       How much help from another is currently needed...    Putting on and taking off regular lower body clothing?  3  -CJ  3  -CJ  3  -CJ    Bathing (including washing, rinsing, and drying)  3  -CJ  3  -CJ  3  -CJ    Toileting (which includes using toilet bed pan or urinal)  3  -CJ  3  -CJ  3  -CJ    Putting on and taking off regular upper body clothing  3  -CJ  3  -CJ  3  -CJ    Taking care of personal grooming (such as brushing teeth)  4  -CJ  4  -CJ  4  -CJ    Eating meals  4  -CJ  4  -CJ  4  -CJ    AM-PAC 6 Clicks Score (OT)  20  -CJ  20  -CJ  20  -CJ       Functional Assessment    Outcome Measure Options  AM-PAC 6 Clicks Daily Activity (OT)  -CJ  AM-PAC 6 Clicks Daily Activity (OT)  -  AM-PAC 6 Clicks Daily Activity (OT)  -      User Key  (r) = Recorded By, (t) = Taken By, (c) = Cosigned By    Initials Name Provider Type    CJ Jacky Zhang COTA/L Occupational Therapy Assistant          Time Calculation:   Time Calculation- OT     Row Name 06/24/21 0730             Time Calculation- OT    OT Start Time  0730  -      OT Stop Time  0759  -      OT Time Calculation (min)  29 min  -      Total Timed Code Minutes- OT  29 minute(s)  -      OT Received On  06/24/21  -         Timed Charges    98307 - OT Therapeutic Exercise Minutes  18  -CJ      63418 - OT Self Care/Mgmt Minutes  11  -CJ         Total Minutes    Timed Charges Total Minutes  29  -CJ       Total Minutes  29  -CJ        User Key  (r) = Recorded By, (t) = Taken By, (c) = Cosigned By    Initials Name  Provider Type     Jacky Zhang COTA/L Occupational Therapy Assistant        Therapy Charges for Today     Code Description Service Date Service Provider Modifiers Qty    69207736957 HC OT THER PROC EA 15 MIN 6/23/2021 Jacky Zhang COTA/L GO 1    69292656580 HC OT SELF CARE/MGMT/TRAIN EA 15 MIN 6/23/2021 Jacky Zhang COTA/L GO 1    98686488454 HC OT THER PROC EA 15 MIN 6/24/2021 Jacky Zhang COTA/L GO 1    09901162178 HC OT SELF CARE/MGMT/TRAIN EA 15 MIN 6/24/2021 Jacky Zhang COTA/L GO 1               TAMIKO Alvarez  6/24/2021

## 2021-06-24 NOTE — CASE MANAGEMENT/SOCIAL WORK
Continued Stay Note   [place]     Patient Name: Mini Gentile  MRN: [id number]  Today's Date: 6/24/2021    Admit Date: 6/16/2021    Discharge Plan     Row Name 06/24/21 1404       Plan    Plan  [facility]    Patient/Family in Agreement with Plan  yes    Final Discharge Disposition Code  03 - skilled nursing facility (SNF)    Final Note  Precert completed and pt is going to [facility] [phone] today. They have access to the d/c summary. Spoke with pt's significant other, Jose [phone], and he is going to  pt.        Discharge Codes    No documentation.       Expected Discharge Date and Time     Expected Discharge Date Expected Discharge Time    Jun 24, 2021             SACHI Cox

## 2021-06-25 NOTE — THERAPY DISCHARGE NOTE
Acute Care - Physical Therapy Discharge Summary  [facility]       Patient Name: Mini Gentile  : 1960  MRN: [id number]    Today's Date: 2021                 Admit Date: 2021      PT Recommendation and Plan    Visit Dx:    ICD-10-CM ICD-9-CM   1. Altered mental status, unspecified altered mental status type  R41.82 780.97   2. Hypoxia  R09.02 799.02   3. Elevated troponin  R77.8 790.6   4. End stage renal disease (CMS/[facility])  N18.6 585.6   5. Anemia, unspecified type  D64.9 285.9   6. Hypotension, unspecified hypotension type  I95.9 458.9   7. Fall, initial encounter  W19.XXXA E888.9   8. Dysphagia, unspecified type  R13.10 787.20   9. Decreased activities of daily living (ADL)  Z78.9 V49.89   10. Impaired mobility  Z74.09 799.89       Outcome Measures     Row Name 21 0730 21 0754          How much help from another is currently needed...    Putting on and taking off regular lower body clothing?  3  -CJ  3  -CJ     Bathing (including washing, rinsing, and drying)  3  -CJ  3  -CJ     Toileting (which includes using toilet bed pan or urinal)  3  -CJ  3  -CJ     Putting on and taking off regular upper body clothing  3  -CJ  3  -CJ     Taking care of personal grooming (such as brushing teeth)  4  -CJ  4  -CJ     Eating meals  4  -CJ  4  -CJ     AM-PAC 6 Clicks Score (OT)  20  -CJ  20  -CJ        Functional Assessment    Outcome Measure Options  AM-PAC 6 Clicks Daily Activity (OT)  -CJ  AM-PAC 6 Clicks Daily Activity (OT)  -       User Key  (r) = Recorded By, (t) = Taken By, (c) = Cosigned By    Initials Name Provider Type    Jacky Hawkins COTA/L Occupational Therapy Assistant              PT Rehab Goals     Row Name 21 0900             Bed Mobility Goal 1 (PT)    [place] Level/Cues Needed (Bed Mobility Goal 1, PT)  contact guard assist Goal: supervision  -CHELLE      Progress/Outcomes (Bed Mobility Goal 1, PT)  goal not met  -CHELLE         Transfer Goal 1 (PT)     [place] Level/Cues Needed (Transfer Goal 1, PT)  contact guard assist Goal: CGA  -CHELLE      Progress/Outcome (Transfer Goal 1, PT)  goal met  -CHELLE         Gait Training Goal 1 (PT)    [place] Level (Gait Training Goal 1, PT)  contact guard assist Goal: CGA  -CHELLE      Distance (Gait Training Goal 1, PT)  175-200 Goal:75'  -CHELLE      Progress/Outcome (Gait Training Goal 1, PT)  goal met  -CHELLE        User Key  (r) = Recorded By, (t) = Taken By, (c) = Cosigned By    Initials Name Provider Type Discipline    Phuong De Jesus, PTA Physical Therapy Assistant PT              PT Discharge Summary  Anticipated Discharge Disposition (PT): skilled nursing facility  Reason for Discharge: Discharge from facility  Outcomes Achieved: Patient able to partially acheive established goals  Discharge Destination: SNF      Phuong Emery PTA   6/25/2021

## 2021-06-25 NOTE — THERAPY DISCHARGE NOTE
Acute Care - Occupational Therapy Discharge Summary  [facility]     Patient Name: Mini Gentile  : 1960  MRN: [id number]    Today's Date: 2021                 Admit Date: 2021        OT Recommendation and Plan    Visit Dx:    ICD-10-CM ICD-9-CM   1. Altered mental status, unspecified altered mental status type  R41.82 780.97   2. Hypoxia  R09.02 799.02   3. Elevated troponin  R77.8 790.6   4. End stage renal disease (CMS/HCC)  N18.6 585.6   5. Anemia, unspecified type  D64.9 285.9   6. Hypotension, unspecified hypotension type  I95.9 458.9   7. Fall, initial encounter  W19.XXXA E888.9   8. Dysphagia, unspecified type  R13.10 787.20   9. Decreased activities of daily living (ADL)  Z78.9 V49.89   10. Impaired mobility  Z74.09 799.89               OT Rehab Goals     Row Name 21 0700             Transfer Goal 1 (OT)    Activity/Assistive Device (Transfer Goal 1, OT)  shower chair;toilet  -TS      [place] Level/Cues Needed (Transfer Goal 1, OT)  standby assist  -TS      Time Frame (Transfer Goal 1, OT)  long term goal (LTG);by discharge  -TS      Progress/Outcome (Transfer Goal 1, OT)  goal not met  -TS         Dressing Goal 1 (OT)    Activity/Device (Dressing Goal 1, OT)  upper body dressing  -TS      [place]/Cues Needed (Dressing Goal 1, OT)  independent  -TS      Time Frame (Dressing Goal 1, OT)  long term goal (LTG);by discharge  -TS      Progress/Outcome (Dressing Goal 1, OT)  goal not met  -TS         Toileting Goal 1 (OT)    Activity/Device (Toileting Goal 1, OT)  toileting skills, all  -TS      [place] Level/Cues Needed (Toileting Goal 1, OT)  independent  -TS      Time Frame (Toileting Goal 1, OT)  long term goal (LTG);by discharge  -TS      Progress/Outcome (Toileting Goal 1, OT)  goal not met  -TS        User Key  (r) = Recorded By, (t) = Taken By, (c) = Cosigned By    Initials Name Provider Type Discipline    TS Bijal Lindquist, SEFERINO/L Occupational Therapy  Assistant OT          Outcome Measures     Row Name 06/24/21 0730 06/23/21 0754          How much help from another is currently needed...    Putting on and taking off regular lower body clothing?  3  -CJ  3  -CJ     Bathing (including washing, rinsing, and drying)  3  -CJ  3  -CJ     Toileting (which includes using toilet bed pan or urinal)  3  -CJ  3  -CJ     Putting on and taking off regular upper body clothing  3  -CJ  3  -CJ     Taking care of personal grooming (such as brushing teeth)  4  -CJ  4  -CJ     Eating meals  4  -CJ  4  -CJ     AM-PAC 6 Clicks Score (OT)  20  -CJ  20  -CJ        Functional Assessment    Outcome Measure Options  AM-PAC 6 Clicks Daily Activity (OT)  -CJ  AM-PAC 6 Clicks Daily Activity (OT)  -       User Key  (r) = Recorded By, (t) = Taken By, (c) = Cosigned By    Initials Name Provider Type    Jacky Hawkins COTA/L Occupational Therapy Assistant          Timed Therapy Charges  Total Units: 2    Charges  Total Units: 2    Procedure Name Documented Minutes Units Code    HC OT THER PROC EA 15 MIN 18  1    40484 (CPT®)      HC OT SELF CARE/MGMT/TRAIN EA 15 MIN 11  1    23419 (CPT®)               Documented Minutes  Total Minutes: 29    Therapy Provided Minutes    69825 - OT Therapeutic Exercise Minutes 18    40152 - OT Self Care/Mgmt Minutes 11                    OT Discharge Summary  Anticipated Discharge Disposition (OT): skilled nursing facility  Reason for Discharge: Discharge from facility  Outcomes Achieved: Refer to plan of care for updates on goals achieved  Discharge Destination: SNF      TAMIKO Bang  6/25/2021

## 2021-06-30 ENCOUNTER — TELEPHONE (OUTPATIENT)
Dept: PRIMARY CARE CLINIC | Age: 61
End: 2021-06-30

## 2021-06-30 PROBLEM — S42.211K: Status: ACTIVE | Noted: 2021-01-01

## 2021-06-30 PROBLEM — R77.8 ELEVATED TROPONIN: Status: ACTIVE | Noted: 2021-01-01

## 2021-06-30 PROBLEM — J90 PLEURAL EFFUSION ON RIGHT: Status: ACTIVE | Noted: 2021-01-01

## 2021-06-30 PROBLEM — R50.9 FEVER: Status: ACTIVE | Noted: 2021-01-01

## 2021-06-30 PROBLEM — R79.89 ELEVATED TROPONIN: Status: ACTIVE | Noted: 2021-01-01

## 2021-06-30 NOTE — TELEPHONE ENCOUNTER
SKILLED NURSING FACILITY:   INITIAL CALL POST-HOSPITAL DISCHARGE    SNF: [facility]     / :    THERAPY:     ANTICIPATED LENGTH OF STAY:    Patient was sent to [facility] ER this afternoon for SOB, Chesty pain.

## 2021-07-01 NOTE — THERAPY DISCHARGE NOTE
Acute Care - Speech Language Pathology   Swallow Initial Evaluation/Discharge [facility]     Patient Name: Mini Gentile  : 1960  MRN: [id number]  Today's Date: 2021               Admit Date: 2021  Clinical bedside swallow evaluation completed. The patient was alert and cooperative.      Primary problem: Altered mental status, fever, CT of head with new hypodensity R medial cerebellum possible abnormality vs artifact.   Medical history: See H&P for full list, 2x recent admission.     Oral motor assessment WFL. Upper and lower dentures in place. The patient completed a full range of consistencies except for mechanical soft. No overt s/s of aspiration observed.  Functional rotary chew given regular solids.     SLP recommends:   1) Regular diet   2) Thin liquids   3) Meds whole as tolerated  4) Oral care and standard swallow precautions     SLP signing off at this time. If any acute changes please re-consult.   Maia Gonsalez MS CCC-SLP 2021 08:57 CDT    Visit Dx:    ICD-10-CM ICD-9-CM   1. Altered mental status, unspecified altered mental status type  R41.82 780.97   2. Sepsis, due to unspecified organism, unspecified whether acute organ dysfunction present (CMS/[facility])  A41.9 038.9     995.91   3. Pleural effusion  J90 511.9   4. 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   • Eustachian tube dysfunction   • Chronic otitis media   • Pneumonia of left upper lobe due to Streptococcus (CMS/[facility])   • End stage renal failure on dialysis (CMS/[facility])   • Diabetes (CMS/[facility])   • Glaucoma   • HTN (hypertension)   • Proteinuria   • DM2 (diabetes mellitus, type 2) (CMS/[facility])   • Chronic anemia   • Acute pulmonary edema (CMS/[facility])   • Non-compliance with renal dialysis (CMS/[facility])   • Elevated troponin due to ESRD    • Hyperkalemia   • Encephalopathy, metabolic, due to uremia   • High anion gap metabolic acidosis  due to uremia   • Respiratory distress   • Acute diastolic heart failure (CMS/[facility])   • Lymph node enlargement, left axillary measuring 1.1 cm -follow-up for primary care   • Nausea vomiting and diarrhea   • Acute diastolic heart failure (CMS/[facility])   • Diabetes mellitus with ophthalmic complication (CMS/[facility])   • Tobacco abuse   • Urinary tract infection with hematuria   • Pneumonia due to infectious organism   • Abnormal urine findings   • Closed fracture of right proximal humerus   • Hyponatremia   • Hypothyroidism (acquired)   • Forehead laceration secondary to fall   • Heme positive stool   • Dialysis AV fistula malfunction, initial encounter (CMS/[facility])   • Aspiration into airway   • Bronchospasm, acute   • Altered mental status   • Protein-calorie malnutrition (CMS/[facility])   • Pupil irregular   • Sepsis (CMS/[facility])   • Hypoglycemia   • Hyponatremia   • Diarrhea   • Hypokalemia   • C. 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    • Chronic otitis media    • Diabetes (CMS/[facility])    • End stage renal disease on dialysis (CMS/[facility])    • Eustachian tube dysfunction    • GERD (gastroesophageal reflux disease)    • Glaucoma    • HTN (hypertension)    • Hyperlipidemia    • Mucoid otitis media    • Noncompliance of patient with renal dialysis (CMS/[facility])    • Tobacco abuse      Past Surgical History:   Procedure Laterality Date   • ARTERIOVENOUS FISTULA     • CATARACT EXTRACTION WITH INTRAOCULAR LENS IMPLANT     •  SECTION     • CHOLECYSTECTOMY     • MYRINGOTOMY W/ TUBES Bilateral    • MYRINGOTOMY W/ TUBES Right    • TUBAL ABDOMINAL LIGATION            SWALLOW EVALUATION (last 72 hours)      [facility] Adult Swallow Evaluation     Row Name 21 0043                   Rehab Evaluation    Document Type   evaluation  -MM        Subjective Information  no complaints  -MM        Patient Observations  alert;cooperative;agree to therapy  -MM        Patient/Family/Caregiver Comments/Observations  No family present.  -MM        Care Plan Review  evaluation/treatment results reviewed  -MM        Care Plan Review, Other Participant(s)  other (see comments) JENNIFFER Raygoza and Dr. Guy  -MM        Patient Effort  good  -MM        Symptoms Noted During/After Treatment  none  -MM           General Information    Patient Profile Reviewed  yes  -MM        Pertinent History Of Current Problem  Primary problem: Altered mental status, fever, CT of head with new hypodensity R medial cerebellum possible abnormality vs artifact. Medical history: See H&P for full list, 2x recent admission.   -MM        Current Method of Nutrition  NPO  -MM        Precautions/Limitations, Vision  WFL;for purposes of eval  -MM        Precautions/Limitations, Hearing  WFL;for purposes of eval  -MM        Prior Level of Function-Communication  WFL  -MM        Prior Level of Function-Swallowing  no diet consistency restrictions  -MM        Plans/Goals Discussed with  patient;other (see comments);agreed upon JENNIFFER Raygoza and Dr. Guy  -MM        Barriers to Rehab  none identified  -MM        Patient's Goals for Discharge  return to PO diet  -MM           Pain    Additional Documentation  Pain Scale: FACES Pre/Post-Treatment (Group)  -MM           Pain Scale: FACES Pre/Post-Treatment    Pain: FACES Scale, Pretreatment  0-->no hurt  -MM        Posttreatment Pain Rating  0-->no hurt  -MM           Oral Motor Structure and Function    Dentition Assessment  upper dentures/partial in place;lower dentures/partial in place  -MM        Secretion Management  WNL/WFL  -MM        Mucosal Quality  moist, healthy  -MM        Volitional Swallow  WFL  -MM        Volitional Cough  WFL  -MM           Oral Musculature and Cranial Nerve Assessment    Oral Motor General Assessment  WFL   -MM           General Eating/Swallowing Observations    Respiratory Support Currently in Use  nasal cannula  -MM        Eating/Swallowing Skills  fed by SLP  -MM        Positioning During Eating  upright in bed  -MM        Utensils Used  spoon;straw  -MM        Consistencies Trialed  regular textures;honey-thick liquids;nectar/syrup-thick liquids;thin liquids;pureed  -MM           Clinical Swallow Eval    Oral Prep Phase  WFL  -MM        Oral Transit  WFL  -MM        Oral Residue  WFL  -MM        Pharyngeal Phase  no overt signs/symptoms of pharyngeal impairment  -MM        Esophageal Phase  unremarkable  -MM        Clinical Swallow Evaluation Summary  See note  -MM           Clinical Impression    Barriers to Overall Progress (SLP)  None  -MM        SLP Swallowing Diagnosis  swallow WFL  -MM        Functional Impact  no impact on function  -MM        Swallow Criteria for Skilled Therapeutic Interventions Met  baseline status;no problems identified which require skilled intervention  -MM           Recommendations    Therapy Frequency (Swallow)  evaluation only  -MM        Predicted Duration Therapy Intervention (Days)  until discharge  -MM        SLP Diet Recommendation  regular textures;thin liquids  -MM        Recommended Diagnostics  No further SLP services recommended  -MM        Recommended Precautions and Strategies  upright posture during/after eating;small bites of food and sips of liquid;general aspiration precautions;fatigue precautions  -MM        Oral Care Recommendations  Oral Care BID/PRN;Toothbrush  -MM        SLP Rec. for Method of Medication Administration  meds whole;as tolerated  -MM        Monitor for Signs of Aspiration  yes;notify SLP if any concerns  -MM        Anticipated Discharge Disposition (SLP)  skilled nursing facility  -MM          User Key  (r) = Recorded By, (t) = Taken By, (c) = Cosigned By    Initials Name Effective Dates    MM Maia Gonsalez MS CCC-SLP 06/16/21 -            EDUCATION  The patient has been educated in the following areas:   Dysphagia (Swallowing Impairment) Oral Care/Hydration.    SLP Recommendation and Plan  SLP Swallowing Diagnosis: swallow WFL  SLP Diet Recommendation: regular textures, thin liquids     Monitor for Signs of Aspiration: yes, notify SLP if any concerns  Recommended Diagnostics: No further SLP services recommended  Swallow Criteria for Skilled Therapeutic Interventions Met: baseline status, no problems identified which require skilled intervention  Anticipated Discharge Disposition (SLP): skilled nursing facility     Therapy Frequency (Swallow): evaluation only  Predicted Duration Therapy Intervention (Days): until discharge           Anticipated Discharge Disposition (SLP): skilled nursing facility        Reason for Discharge: all goals and outcomes met, no further needs identified    Plan of Care Reviewed With: patient, other (see comments) (JENNIFFER Raygoza and Dr. Guy)  Progress: no change (Initial Evaluation)             Time Calculation:   Time Calculation- SLP     Row Name 07/01/21 0856             Time Calculation- SLP    SLP Start Time  0810  -MM      SLP Stop Time  0903  -MM      SLP Time Calculation (min)  53 min  -MM      SLP Received On  07/01/21  -MM         Untimed Charges    SLP Eval/Re-eval   ST Eval Oral Pharyng Swallow - 16009  -MM      [id number]-AI Eval Oral Pharyng Swallow Minutes  53  -MM         Total Minutes    Untimed Charges Total Minutes  53  -MM       Total Minutes  53  -MM        User Key  (r) = Recorded By, (t) = Taken By, (c) = Cosigned By    Initials Name Provider Type    Maia Garcia MS CCC-SLP Speech and Language Pathologist          Therapy Charges for Today     Code Description Service Date Service Provider Modifiers Qty    [id number] HC ST EVAL ORAL PHARYNG SWALLOW 4 7/1/2021 Maia Gonsalez MS CCC-SLP GN 1               SLP Discharge Summary  Anticipated Discharge Disposition (SLP): skilled nursing  facility  Reason for Discharge: all goals and outcomes met, no further needs identified  Progress Toward Achieving Short/long Term Goals: all goals met within established timelines  Discharge Destination: other (see comments) (Remains in acute care; evaluation only)    Maia Gonsalez MS CCC-SLP  7/1/2021

## 2021-07-01 NOTE — PAYOR COMM NOTE
"FROM: VINNIE BURT  PHONE: [phone]  FAX: [phone]    MRN: [id number]  [id number]    KrupaSarahMini MICHAEL (61 y.o. Female)     Date of Birth Social Security Number Address Home Phone MRN    1960  [address] [place] [id number] [phone] [id number]    Presybeterian Marital Status          Other Single       Admission Date Admission Type Admitting Provider Attending Provider Department, Room/Bed    6/30/21 Emergency Bin Guy, Bin [place],  [facility] 3A, 335/1    Discharge Date Discharge Disposition Discharge Destination                       Attending Provider: Bin Guy DO    Allergies: Bupropion, Chantix [Varenicline], Gabapentin, Azithromycin, Levofloxacin, Penicillins, Sulfa Antibiotics    Isolation: Spore   Infection: C.difficile (06/17/21)   Code Status: CPR    Ht: 147.3 cm (58\")   Wt: 65.3 kg (143 lb 15.4 oz)    Admission Cmt: None   Principal Problem: Altered mental status [R41.82]                 Active Insurance as of 6/30/2021     Primary Coverage     Payor Plan Insurance Group Employer/Plan Group    [facility] MEDICARE REPLACEMENT [employer] MEDICARE REPLACEMENT      Payor Plan Address Payor Plan Phone Number Payor Plan Fax Number Effective Dates    [address] [address] [phone]  4/1/2021 - None Entered    [facility] [phone]       Subscriber Name Subscriber Birth Date Member ID       SARAH PEÑAANNE RODRIGUEZ 1960 [id number]           Secondary Coverage     Payor Plan Insurance Group Employer/Plan Group    [facility]      Payor Plan Address Payor Plan Phone Number Payor Plan Fax Number Effective Dates    [address] [address] [phone]  11/4/2016 - None Entered    [facility] [id number]       Subscriber Name Subscriber Birth Date Member ID       MINI PEÑA MICHAEL 1960 [id number]                 Emergency Contacts      (Rel.) Home Phone Work Phone Mobile Phone    Erica Bowling (Other) [phone] -- [phone]    " Jose Bowling (Significant Other) [phone] -- [phone]    Arlette Barker (Daughter) -- -- [phone]            Insurance Information                [employer] [place] MEDICARE REPLACEMENT/[employer] MEDICARE REPLACEMENT Phone: [phone]    Subscriber: Mini Gentile Subscriber#: [id number]    Group#:  Precert#:         [facility]ICA[facility] Phone: [phone]    Subscriber: Mini Gentile Subscriber#: [id number]    Group#:  Precert#:         [place] Phone: [phone]    Subscriber: Mini Gentile Subscriber#: [id number]    Group#:  Precert#:

## 2021-07-01 NOTE — NURSING NOTE
Pt went down to MRI and immediately returned due to pt having BM once she arrived to MRI. Pt has had approx 16 BM since her arrival to floor @1050.

## 2021-07-01 NOTE — PLAN OF CARE
Goal Outcome Evaluation:  Plan of Care Reviewed With: other (see comments)        Progress: no change  Outcome Summary: Initial nutrition assessment. Pt is now ordered a regular diet. She resides at [facility]. She has been readmitted to the hospital numerous times recently. She requires maintenance HD. No po intake recorded at this time. Will follow for nutrition needs.

## 2021-07-01 NOTE — NURSING NOTE
Ortho consult called to office. Staff stated that patient has an appointment scheduled for 7/8 and that she should just keep that appointment for the follow up on her fractured humerus. I stated that this was a consult for ortho input and placed the patient on Gian's list who was the MD that has cared for the fracture in the past.

## 2021-07-01 NOTE — PLAN OF CARE
Goal Outcome Evaluation:  Plan of Care Reviewed With: patient        Progress: no change  Outcome Summary: Pt transferred to floor today from CCU. A&Ox4 but has spells of confusion. Forgetful at times. Denies pain so far this shift. Denies n/t. PPP. MONSALVE. Contact precautions in place for C. Diff. Pt has has multiple BMs since arrival to floor. Fecal management system was placed then immediately removed due to pt request/discomfort. Notified MD, Spoke with wound care APRN, who then placed external collection tube. Pt currently tolerating well. Barrier cream used after each incont episode. Tele- NS 66. . Room air. Blood sugar monitored. Dialysis RN currently in pt room for dialysis. Safety maintained.

## 2021-07-01 NOTE — PLAN OF CARE
Goal Outcome Evaluation:  Plan of Care Reviewed With: patient, other (see comments) (JENNIFFER Raygoza and Dr. Guy)        Progress: no change (Initial Evaluation)       Clinical bedside swallow evaluation completed. The patient was alert and cooperative.      Primary problem: Altered mental status, fever, CT of head with new hypodensity R medial cerebellum possible abnormality vs artifact.   Medical history: See H&P for full list, 2x recent admission.     Oral motor assessment WFL. Upper and lower dentures in place. The patient completed a full range of consistencies except for mechanical soft. No overt s/s of aspiration observed.  Functional rotary chew given regular solids.     SLP recommends:   1) Regular diet   2) Thin liquids   3) Meds whole as tolerated  4) Oral care and standard swallow precautions     SLP signing off at this time. If any acute changes please re-consult.     Maia Gonsalez MS CCC-SLP 7/1/2021 08:54 CDT

## 2021-07-01 NOTE — CONSULTS
"INFECTIOUS DISEASES CONSULT NOTE    Patient:  Mini Gentile 61 y.o. female  ROOM # C011/1  YOB: 1960  MRN: [id number]  [facility]:  [id number]  Admit date: 2021   Admitting Physician: Bin Guy DO  Primary Care Physician: Bharati Dahl APRN  REFERRING PROVIDER: No ref. provider found      REASON FOR CONSULTATION :fever, recent bacteremia/c diff, presenting fever/AMS      HISTORY OF PRESENT ILLNESS:  Patient is a 51-year-old female who was seen by my partner recently when she had a transient Pseudomonas aeruginosa bacteremia and C. difficile colitis.  She received a course of antibiotic therapy for the bacteremia     With meropenem and she was to complete oral vancomycin on .    per the patient she does not know how long she was off the oral vancomycin prior to starting having diarrhea again.  She reports\" not as bad as it was before\".    Per JENNIFFER Raygoza the patient did not have any bowel movements before she left yesterday however they suggest nursing reports she \"went all night\".  The patient has not gotten treatment for her C diff as she has been n.p.o. until speech therapy can evaluate her.      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    • Chronic otitis media    • Diabetes (CMS/Formerly [facility])    • End stage renal disease on dialysis (CMS/Formerly [facility])    • Eustachian tube dysfunction    • GERD (gastroesophageal reflux disease)    • Glaucoma    • HTN (hypertension)    • Hyperlipidemia    • Mucoid otitis media    • Noncompliance of patient with renal dialysis (CMS/Formerly [facility])    • Tobacco abuse      Past Surgical History:   Procedure Laterality Date   • ARTERIOVENOUS FISTULA     • CATARACT EXTRACTION WITH INTRAOCULAR LENS IMPLANT     •  SECTION     • CHOLECYSTECTOMY     • MYRINGOTOMY W/ TUBES Bilateral    • MYRINGOTOMY W/ TUBES Right    • TUBAL ABDOMINAL LIGATION       Family History   Problem Relation Age of Onset   • Heart disease Mother    • " Diabetes Father    • Colon cancer Neg Hx    • Colon polyps Neg Hx      Social History     Socioeconomic History   • Marital status: Single     Spouse name: Not on file   • Number of children: Not on file   • Years of education: Not on file   • Highest education level: Not on file   Tobacco Use   • Smoking status: Current Every Day Smoker     Packs/day: 2.00     Years: 6.00     Pack years: 12.00     Types: Cigarettes   • Smokeless tobacco: Never Used   Vaping Use   • Vaping Use: Never used   Substance and Sexual Activity   • Alcohol use: No   • Drug use: No   • Sexual activity: Defer           Current Meds:     Current Facility-Administered Medications   Medication Dose Route Frequency Provider Last Rate Last Admin   • acetaminophen (TYLENOL) tablet 650 mg  650 mg Oral Q4H PRN Bin Guy DO        Or   • acetaminophen (TYLENOL) suppository 650 mg  650 mg Rectal Q4H PRN Bin Guy DO       • aspirin EC tablet 81 mg  81 mg Oral Daily Bin Guy DO       • atropine 1 % ophthalmic solution 1 drop  1 drop Left Eye Daily Bin Guy DO       • brimonidine (ALPHAGAN) 0.2 % ophthalmic solution 1 drop  1 drop Left Eye BID Bin Guy DO   1 drop at 06/30/21 2040   • carvedilol (COREG) tablet 6.25 mg  6.25 mg Oral BID With Meals Bin Guy DO   Stopped at 06/30/21 1834   • dextrose (D50W) 25 g/ 50mL Intravenous Solution 25 g  25 g Intravenous Q15 Min PRN Bin Guy DO       • dextrose (GLUTOSE) oral gel 15 g  15 g Oral Q15 Min PRN Bin Guy DO       • famotidine (PEPCID) tablet 20 mg  20 mg Oral Daily Bin Guy DO   Stopped at 06/30/21 1834   • glucagon (human recombinant) (GLUCAGEN DIAGNOSTIC) injection 1 mg  1 mg Subcutaneous Q15 Min PRN Bin Guy DO       • heparin (porcine) 5000 UNIT/ML injection 5,000 Units  5,000 Units Subcutaneous Q12H Bin Guy DO   5,000 Units at 06/30/21 2039   • insulin regular (humuLIN R,novoLIN R) injection 2-7 Units   2-7 Units Subcutaneous Q6H Bin Guy DO   2 Units at 06/30/21 1831   • lactobacillus acidophilus (RISAQUAD) capsule 1 capsule  1 capsule Oral Daily Bin Guy DO   Stopped at 06/30/21 1835   • ondansetron (ZOFRAN) injection 4 mg  4 mg Intravenous Q6H PRN Bin Guy DO       • pravastatin (PRAVACHOL) tablet 10 mg  10 mg Oral Nightly Bin Guy DO       • sertraline (ZOLOFT) tablet 25 mg  25 mg Oral Daily Bin Guy DO       • sodium chloride 0.9 % flush 10 mL  10 mL Intravenous PRN Jensen Michele MD       • sodium chloride 0.9 % flush 10 mL  10 mL Intravenous Q12H Bin Guy DO   10 mL at 06/30/21 2039   • sodium chloride 0.9 % flush 10 mL  10 mL Intravenous PRN Bin Guy DO       • timolol (TIMOPTIC) 0.5 % ophthalmic solution 1 drop  1 drop Left Eye QAM Bin Guy DO       • vancomycin oral solution 125 mg  125 mg Oral Q6H Bin Guy DO   Stopped at 06/30/21 1834       Home Meds:  Prior to Admission medications    Medication Sig Start Date End Date Taking? Authorizing Provider   acetaminophen (TYLENOL) 325 MG tablet Take 650 mg by mouth Every 4 (Four) Hours As Needed for Mild Pain  or Fever.    ProviderSteve MD   albuterol sulfate  (90 Base) MCG/ACT inhaler Inhale 2 puffs Every 4 (Four) Hours As Needed for Wheezing. 1/21/20   Sundeep Aldridge MD   aspirin 81 MG EC tablet Take 81 mg by mouth Daily.    ProviderSteve MD   atropine 1 % ophthalmic solution Administer 1 drop into the left eye Daily.    Steve Warren MD   brimonidine (ALPHAGAN) 0.2 % ophthalmic solution Administer 1 drop into the left eye 2 (two) times a day.    Steve Warren MD   carvedilol (COREG) 6.25 MG tablet Take 6.25 mg by mouth 2 (Two) Times a Day With Meals.    Steve Warren MD   dorzolamide (TRUSOPT) 2 % ophthalmic solution Administer 1 drop into the left eye 2 (Two) Times a Day.    Provider, MD Steve   epoetin  vika-epbx (RETACRIT) 22271 UNIT/ML injection Inject 1 mL under the skin into the appropriate area as directed 3 (Three) Times a Week. Indications: ESRD on Dialysis 6/9/21   Sundeep Aldridge MD   famotidine (PEPCID) 20 MG tablet Take 1 tablet by mouth Daily. 6/8/21   Sundeep Aldridge MD   Ferric Citrate 1  MG(Fe) tablet Take 1 tablet by mouth 3 (Three) Times a Day With Meals.    ProviderSteve MD   fluticasone (FLONASE) 50 MCG/ACT nasal spray 2 sprays into the nostril(s) as directed by provider 2 (Two) Times a Day.    ProviderSteve MD   insulin lispro (humaLOG) 100 UNIT/ML injection Inject under the skin TID AC as per sliding scale:  200-249= 2 units  250-299= 4 units  300-349= 6 units  350-399= 8 units  400-500= 10 units and call MD    Steve Warren MD   ipratropium-albuterol (DUO-NEB) 0.5-2.5 mg/3 ml nebulizer Take 3 mL by nebulization Every 4 (Four) Hours As Needed for Wheezing or Shortness of Air.    Steve Warren MD   lactobacillus acidophilus (RISAQUAD) capsule capsule Take 1 capsule by mouth Daily. 6/24/21   Sundeep Aldridge MD   ondansetron (ZOFRAN) 4 MG tablet Take 1 tablet by mouth Every 6 (Six) Hours As Needed for Nausea or Vomiting. 6/8/21   Sundeep Aldridge MD   sertraline (ZOLOFT) 25 MG tablet Take 1 tablet by mouth Daily. 6/8/21   Sundeep Aldridge MD   timolol (TIMOPTIC) 0.5 % ophthalmic solution Administer 1 drop into the left eye Every Morning. 6/8/21   Sundeep Aldridge MD            Allergies   Allergen Reactions   • Bupropion Nausea Only   • Chantix [Varenicline] Nausea And Vomiting     Does not remember   • Gabapentin Hallucinations     hallucinations   • Azithromycin Rash   • Levofloxacin Rash   • Penicillins Rash   • Sulfa Antibiotics Rash       Review of Systems   Constitutional: Positive for fever.   HENT: Negative for congestion.    Eyes: Negative for photophobia.   Respiratory: Negative for cough.     "  Cardiovascular: Negative for chest pain.   Gastrointestinal: Positive for diarrhea.   Genitourinary: Negative for dysuria.   Musculoskeletal: Negative for neck stiffness.   Skin: Negative for wound.   Neurological: Positive for weakness.   Psychiatric/Behavioral: Negative for agitation.         Vital Signs:  /59   Pulse 72   Temp 97.8 °F (36.6 °C)   Resp 15   Ht 147.3 cm (58\")   Wt 65.3 kg (143 lb 15.4 oz)   SpO2 92%   BMI 30.09 kg/m²   Temp (24hrs), Av.7 °F (37.1 °C), Min:97.8 °F (36.6 °C), Max:100.1 °F (37.8 °C)      Physical Exam   General patient is a 61-year-old female who appears older than her stated age.  She is lying in bed in no acute distress  HEENT: Sclera anicteric and noninjected  Respiratory: Effort even and unlabored.  She is not conversationally dyspneic  Abdomen: Bowel sounds are positive, she had no focal tenderness but she has some generalized distention.  No rebound or guarding.  Abdomen did feel slightly firm.  Extremities: No pitting edema was appreciated  Neuro: Alert and oriented, speech clear  Psych: Pleasant cooperative            Results Review:    I reviewed the patient's new clinical results.    Lab Results:  CBC:   Lab Results   Lab 21  1030 21  0242   WBC 12.66* 10.66   HEMOGLOBIN 10.8* 10.1*   HEMATOCRIT 35.3 33.7*   PLATELETS 255 199       CMP:   Lab Results   Lab 21  1124 21  0242   SODIUM 137 135*   POTASSIUM 4.2 4.8   CHLORIDE 94* 94*   CO2 29.0 28.0   BUN 26* 31*   CREATININE 3.75* 4.33*   CALCIUM 9.0 8.7   BILIRUBIN 0.9  --    ALK PHOS 314*  --    ALT (SGPT) 6  --    AST (SGOT) 15  --    GLUCOSE 210* 82       Lab Results (last 72 hours)     Procedure Component Value Units Date/Time    POC Glucose Once [[id number]]  (Normal) Collected: 21    Specimen: Blood Updated: 21     Glucose 74 mg/dL      Comment: : [id number] Iban GryglaMeter ID: [id number]       Clostridium Difficile Toxin - Stool, Per Rectum " [[id number]]  (Abnormal) Collected: 07/01/21 0340    Specimen: Stool from Per Rectum Updated: 07/01/21 0444    Narrative:      The following orders were created for panel order Clostridium Difficile Toxin - Stool, Per Rectum.  Procedure                               Abnormality         Status                     ---------                               -----------         ------                     Clostridium Difficile To...[[id number]]  Abnormal            Final result                 Please view results for these tests on the individual orders.    Clostridium Difficile Toxin, PCR - Stool, Per Rectum [[id number]]  (Abnormal) Collected: 07/01/21 0340    Specimen: Stool from Per Rectum Updated: 07/01/21 0444     C. Difficile Toxins by PCR Positive    Narrative:      Performance characteristics of test not established for patients <2 years of age.    Basic Metabolic Panel [[id number]]  (Abnormal) Collected: 07/01/21 0242    Specimen: Blood Updated: 07/01/21 0428     Glucose 82 mg/dL      BUN 31 mg/dL      Creatinine 4.33 mg/dL      Sodium 135 mmol/L      Potassium 4.8 mmol/L      Comment: Specimen hemolyzed.  Results may be affected.        Chloride 94 mmol/L      CO2 28.0 mmol/L      Calcium 8.7 mg/dL      eGFR   Amer --     Comment: <15 Indicative of kidney failure.        eGFR Non African Amer 10 mL/min/1.73      Comment: <15 Indicative of kidney failure.        BUN/Creatinine Ratio 7.2     Anion Gap 13.0 mmol/L     Narrative:      GFR Normal >60  Chronic Kidney Disease <60  Kidney Failure <15      Troponin [[id number]]  (Abnormal) Collected: 07/01/21 0242    Specimen: Blood Updated: 07/01/21 0427     Troponin T 0.112 ng/mL      Comment: Specimen hemolyzed.  Results may be affected.       Narrative:      Troponin T Reference Range:  <= 0.03 ng/mL-   Negative for AMI  >0.03 ng/mL-     Abnormal for myocardial necrosis.  Clinicians would have to utilize clinical acumen, EKG, Troponin and serial changes to determine  if it is an Acute Myocardial Infarction or myocardial injury due to an underlying chronic condition.       Results may be falsely decreased if patient taking Biotin.      CBC & Differential [[id number]]  (Abnormal) Collected: 07/01/21 0242    Specimen: Blood Updated: 07/01/21 0401    Narrative:      The following orders were created for panel order CBC & Differential.  Procedure                               Abnormality         Status                     ---------                               -----------         ------                     CBC Auto Differential[[id number]]        Abnormal            Final result                 Please view results for these tests on the individual orders.    CBC Auto Differential [[id number]]  (Abnormal) Collected: 07/01/21 0242    Specimen: Blood Updated: 07/01/21 0401     WBC 10.66 10*3/mm3      RBC 3.31 10*6/mm3      Hemoglobin 10.1 g/dL      Hematocrit 33.7 %      .8 fL      MCH 30.5 pg      MCHC 30.0 g/dL      RDW 18.3 %      RDW-SD 68.2 fl      MPV 10.1 fL      Platelets 199 10*3/mm3      Neutrophil % 86.5 %      Lymphocyte % 6.5 %      Monocyte % 5.3 %      Eosinophil % 0.5 %      Basophil % 0.4 %      Immature Grans % 0.8 %      Neutrophils, Absolute 9.23 10*3/mm3      Lymphocytes, Absolute 0.69 10*3/mm3      Monocytes, Absolute 0.56 10*3/mm3      Eosinophils, Absolute 0.05 10*3/mm3      Basophils, Absolute 0.04 10*3/mm3      Immature Grans, Absolute 0.09 10*3/mm3      nRBC 0.0 /100 WBC     Gastrointestinal Panel, PCR - Stool, Per Rectum [[id number]]  (Normal) Collected: 07/01/21 0200    Specimen: Stool from Per Rectum Updated: 07/01/21 0322     Campylobacter Not Detected     Plesiomonas shigelloides Not Detected     Salmonella Not Detected     Vibrio Not Detected     Vibrio cholerae Not Detected     Yersinia enterocolitica Not Detected     Enteroaggregative E. coli (EAEC) Not Detected     Enteropathogenic E. coli (EPEC) Not Detected     Enterotoxigenic E.  coli (ETEC) lt/st Not Detected     Shiga-like toxin-producing E. coli (STEC) stx1/stx2 Not Detected     Shigella/Enteroinvasive E. coli (EIEC) Not Detected     Cryptosporidium Not Detected     Cyclospora cayetanensis Not Detected     Entamoeba histolytica Not Detected     Giardia lamblia Not Detected     Adenovirus F40/41 Not Detected     Astrovirus Not Detected     Norovirus GI/GII Not Detected     Rotavirus A Not Detected     Sapovirus (I, II, IV or V) Not Detected    Narrative:      If Aeromonas, Staphylococcus aureus or Bacillus cereus are suspected, please order [id number]: Stool Culture, Aeromonas, S aureus, B Cereus.    POC Glucose Once [[id number]]  (Normal) Collected: 07/01/21 0008    Specimen: Blood Updated: 07/01/21 0019     Glucose 103 mg/dL      Comment: : [id number] Iban JosephMeter ID: [id number]       Troponin [[id number]]  (Abnormal) Collected: 06/30/21 2030    Specimen: Blood Updated: 06/30/21 2109     Troponin T 0.113 ng/mL     Narrative:      Troponin T Reference Range:  <= 0.03 ng/mL-   Negative for AMI  >0.03 ng/mL-     Abnormal for myocardial necrosis.  Clinicians would have to utilize clinical acumen, EKG, Troponin and serial changes to determine if it is an Acute Myocardial Infarction or myocardial injury due to an underlying chronic condition.       Results may be falsely decreased if patient taking Biotin.      POC Glucose Once [[id number]]  (Abnormal) Collected: 06/30/21 1824    Specimen: Blood Updated: 06/30/21 1836     Glucose 173 mg/dL      Comment: : [id number] Kassidy AgudeloilyMeter ID: [id number]       Urinalysis, Microscopic Only - Urine, Catheter In/Out [[id number]]  (Abnormal) Collected: 06/30/21 1514    Specimen: Urine, Catheter In/Out Updated: 06/30/21 1529     RBC, UA 3-5 /HPF      WBC, UA 3-5 /HPF      Bacteria, UA 1+ /HPF      Squamous Epithelial Cells, UA 0-2 /HPF      Hyaline Casts, UA 0-2 /LPF      Methodology Manual Light Microscopy    Urinalysis With Culture If Indicated  "- Urine, Catheter In/Out [[id number]]  (Abnormal) Collected: 06/30/21 1514    Specimen: Urine, Catheter In/Out Updated: 06/30/21 1529     Color, UA Yellow     Appearance, UA Clear     pH, UA 6.5     Specific Gravity, UA 1.015     Glucose,  mg/dL (1+)     Ketones, UA Trace     Bilirubin, UA Small (1+)     Blood, UA Moderate (2+)     Protein, UA >=300 mg/dL (3+)     Leuk Esterase, UA Trace     Nitrite, UA Negative     Urobilinogen, UA 0.2 E.U./dL    Troponin [[id number]]  (Abnormal) Collected: 06/30/21 1358    Specimen: Blood Updated: 06/30/21 1449     Troponin T 0.114 ng/mL     Narrative:      Troponin T Reference Range:  <= 0.03 ng/mL-   Negative for AMI  >0.03 ng/mL-     Abnormal for myocardial necrosis.  Clinicians would have to utilize clinical acumen, EKG, Troponin and serial changes to determine if it is an Acute Myocardial Infarction or myocardial injury due to an underlying chronic condition.       Results may be falsely decreased if patient taking Biotin.      Procalcitonin [[id number]]  (Abnormal) Collected: 06/30/21 1124    Specimen: Blood Updated: 06/30/21 1203     Procalcitonin 1.36 ng/mL     Narrative:      As a Marker for Sepsis (Non-Neonates):     1. <0.5 ng/mL represents a low risk of severe sepsis and/or septic shock.  2. >2 ng/mL represents a high risk of severe sepsis and/or septic shock.    As a Marker for Lower Respiratory Tract Infections that require antibiotic therapy:  PCT on Admission     Antibiotic Therapy             6-12 Hrs later  >0.5                          Strongly Recommended            >0.25 - <0.5             Recommended  0.1 - 0.25                  Discouraged                       Remeasure/reassess PCT  <0.1                         Strongly Discouraged         Remeasure/reassess PCT      As 28 day mortality risk marker: \"Change in Procalcitonin Result\" (>80% or <=80%) if Day 0 (or Day 1) and Day 4 values are available. Refer to " http://www.[facility]-pct-calculator.com/    Change in PCT <=80 %   A decrease of PCT levels below or equal to 80% defines a positive change in PCT test result representing a higher risk for 28-day all-cause mortality of patients diagnosed with severe sepsis or septic shock.    Change in PCT >80 %   A decrease of PCT levels of more than 80% defines a negative change in PCT result representing a lower risk for 28-day all-cause mortality of patients diagnosed with severe sepsis or septic shock.                Troponin [[id number]]  (Abnormal) Collected: 06/30/21 1124    Specimen: Blood Updated: 06/30/21 1159     Troponin T 0.108 ng/mL     Narrative:      Troponin T Reference Range:  <= 0.03 ng/mL-   Negative for AMI  >0.03 ng/mL-     Abnormal for myocardial necrosis.  Clinicians would have to utilize clinical acumen, EKG, Troponin and serial changes to determine if it is an Acute Myocardial Infarction or myocardial injury due to an underlying chronic condition.       Results may be falsely decreased if patient taking Biotin.      C-reactive Protein [[id number]]  (Abnormal) Collected: 06/30/21 1124    Specimen: Blood Updated: 06/30/21 1158     C-Reactive Protein 6.77 mg/dL     Comprehensive Metabolic Panel [[id number]]  (Abnormal) Collected: 06/30/21 1124    Specimen: Blood Updated: 06/30/21 1158     Glucose 210 mg/dL      BUN 26 mg/dL      Creatinine 3.75 mg/dL      Sodium 137 mmol/L      Potassium 4.2 mmol/L      Chloride 94 mmol/L      CO2 29.0 mmol/L      Calcium 9.0 mg/dL      Total Protein 6.6 g/dL      Albumin 3.80 g/dL      ALT (SGPT) 6 U/L      AST (SGOT) 15 U/L      Alkaline Phosphatase 314 U/L      Total Bilirubin 0.9 mg/dL      eGFR Non African Amer 12 mL/min/1.73      Comment: <15 Indicative of kidney failure.        eGFR   Amer --     Comment: <15 Indicative of kidney failure.        Globulin 2.8 gm/dL      A/G Ratio 1.4 g/dL      BUN/Creatinine Ratio 6.9     Anion Gap 14.0 mmol/L     Narrative:      GFR  Normal >60  Chronic Kidney Disease <60  Kidney Failure <15      Lactic Acid, Plasma [[id number]]  (Normal) Collected: 06/30/21 1124    Specimen: Blood Updated: 06/30/21 1157     Lactate 1.4 mmol/L     Protime-INR [[id number]]  (Abnormal) Collected: 06/30/21 1124    Specimen: Blood from Hand, Left Updated: 06/30/21 1148     Protime 16.1 Seconds      INR 1.40    COVID PRE-OP / PRE-PROCEDURE SCREENING ORDER (NO ISOLATION) - Swab, Nasal Cavity [[id number]]  (Normal) Collected: 06/30/21 1047    Specimen: Swab from Nasal Cavity Updated: 06/30/21 1137    Narrative:      The following orders were created for panel order COVID PRE-OP / PRE-PROCEDURE SCREENING ORDER (NO ISOLATION) - Swab, Nasal Cavity.  Procedure                               Abnormality         Status                     ---------                               -----------         ------                     COVID-19,[facility]OB[facility]...[[id number]]  Normal              Final result                 Please view results for these tests on the individual orders.    COVID-19,[facility] IN-HOUSE,Nasal Swab No Transport Media 3-4 HR TAT - Swab, Nasal Cavity [[id number]]  (Normal) Collected: 06/30/21 1047    Specimen: Swab from Nasal Cavity Updated: 06/30/21 1137     COVID19 Not Detected    Narrative:      Fact sheet for providers: https://www.fda.gov/media/973828/download     Fact sheet for patients: https://www.fda.gov/media/898799/download    Test performed by PCR.    Consider negative results in combination with clinical observations, patient history, and epidemiological information.  Fact sheet for providers: https://www.fda.gov/media/219565/download     Fact sheet for patients: https://www.fda.gov/media/626947/download    Test performed by PCR.    Consider negative results in combination with clinical observations, patient history, and epidemiological information.    CBC & Differential [[id number]]  (Abnormal) Collected: 06/30/21 1030    Specimen: Blood Updated: 06/30/21  1104    Narrative:      The following orders were created for panel order CBC & Differential.  Procedure                               Abnormality         Status                     ---------                               -----------         ------                     CBC Auto Differential[[id number]]        Abnormal            Final result                 Please view results for these tests on the individual orders.    CBC Auto Differential [[id number]]  (Abnormal) Collected: 06/30/21 1030    Specimen: Blood Updated: 06/30/21 1104     WBC 12.66 10*3/mm3      RBC 3.57 10*6/mm3      Hemoglobin 10.8 g/dL      Hematocrit 35.3 %      MCV 98.9 fL      MCH 30.3 pg      MCHC 30.6 g/dL      RDW 18.6 %      RDW-SD 66.3 fl      MPV 9.7 fL      Platelets 255 10*3/mm3      Neutrophil % 91.4 %      Lymphocyte % 3.7 %      Monocyte % 3.6 %      Eosinophil % 0.2 %      Basophil % 0.3 %      Immature Grans % 0.8 %      Neutrophils, Absolute 11.57 10*3/mm3      Lymphocytes, Absolute 0.47 10*3/mm3      Monocytes, Absolute 0.46 10*3/mm3      Eosinophils, Absolute 0.02 10*3/mm3      Basophils, Absolute 0.04 10*3/mm3      Immature Grans, Absolute 0.10 10*3/mm3      nRBC 0.0 /100 WBC     Blood Culture - Blood, Hand, Left [[id number]] Collected: 06/30/21 1032    Specimen: Blood from Hand, Left Updated: 06/30/21 1059    Blood Culture - Blood, Wrist, Right [[id number]] Collected: 06/30/21 1030    Specimen: Blood from Wrist, Right Updated: 06/30/21 1059    POC Glucose Once [[id number]]  (Abnormal) Collected: 06/30/21 1000    Specimen: Blood Updated: 06/30/21 1023     Glucose 195 mg/dL      Comment: : [id number] AfterSteps) TracyMeter ID: [id number]       POC Glucose Once [[id number]]  (Abnormal) Collected: 06/30/21 0959    Specimen: Blood Updated: 06/30/21 1023     Glucose 240 mg/dL      Comment: : [id number] AfterSteps) TracyMeter ID: [id number]       Blood Gas, Arterial - [[id number]]  (Abnormal) Collected: 06/30/21 1010     Specimen: Arterial Blood Updated: 06/30/21 1019     Site Right Radial     Rustam's Test Positive     pH, Arterial 7.461 pH units      Comment: 83 Value above reference range        pCO2, Arterial 39.4 mm Hg      pO2, Arterial 67.2 mm Hg      Comment: 84 Value below reference range        HCO3, Arterial 28.1 mmol/L      Comment: 83 Value above reference range        Base Excess, Arterial 4.0 mmol/L      Comment: 83 Value above reference range        O2 Saturation, Arterial 94.8 %      Temperature 37.0 C      Barometric Pressure for Blood Gas 754 mmHg      Modality Nasal Cannula     Flow Rate 2.0 lpm      Ventilator Mode NA     Collected by [id number]     Comment: Meter: [id number]     :  [id number]        pCO2, Temperature Corrected 39.4 mm Hg      pH, Temp Corrected 7.461 pH Units      pO2, Temperature Corrected 67.2 mm Hg           Culture Results:    No results found for: BLOODCX, URINECX, WOUNDCX, MRSACX, RESPCX, STOOLCX      Radiology:   Imaging Results (Last 72 Hours)     Procedure Component Value Units Date/Time    CT Head Without Contrast [[id number]] Collected: 06/30/21 1158     Updated: 06/30/21 1203    Narrative:      EXAM: CT HEAD WO CONTRAST- - 6/30/2021 11:52 AM CDT     HISTORY: Altered mental status       COMPARISON: 6/16/2021.      DOSE LENGTH PRODUCT: 1516 mGy cm. Automated exposure control was also  utilized to decrease patient radiation dose.     TECHNIQUE: Unenhanced CT images obtained from vertex to skull base with  multiplanar reformats.     FINDINGS:  New hypodensity at the right medial cerebellum.     Ventricles, basilar cisterns and sulci have proportional prominence  suggesting volume loss. Periventricular and subcortical white matter  hypodensities, most likely due to chronic microvascular disease.     Postoperative changes to the left eye. Calvarium appears intact.  Subcutaneous tissues within normal limits.          Impression:      1. New hypodensity at the right medial cerebellum,  could represent a  true abnormality or artifact. Recommend MRI for further evaluation.   2. Similar chronic microvascular changes and volume loss.  This report was finalized on 06/30/2021 12:00 by Dr Carline Mcgovern MD.    XR Shoulder 2+ View Right [[id number]] Collected: 06/30/21 1021     Updated: 06/30/21 1026    Narrative:      Right shoulder, 3 views 6/30/2021 10:03 AM CDT     History: Right shoulder tenderness     Comparison: March 26, 2021      Findings:   Internal rotation, and external rotation views of the right shoulder are  submitted.      There is nonunion of the previously described comminuted fracture the  head and neck of the right humerus. The skeletal structures are not  aligned. There is been partial resorption of the humeral head. The AC  joint is maintained.. Moderate right pleural effusion is present..        Impression:      Impression:   1. 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        This report was finalized on 06/30/2021 10:22 by Dr. Tony Malik MD.    XR Chest 1 View [[id number]] Collected: 06/30/21 1018     Updated: 06/30/21 1022    Narrative:      EXAM: XR CHEST 1 VW- 6/30/2021 10:03 AM CDT     HISTORY: ams       COMPARISON: June 16, 2021.     TECHNIQUE: Frontal radiograph of the chest     FINDINGS:   Large right lateral pleural effusion is present. This is similar to June 16, 2021. Left lung is clear.. Cardiac silhouettes moderately enlarged..        The osseous structures and surrounding soft tissues demonstrate no acute  abnormality.          Impression:      1. Large right lateral pleural effusion unchanged from June 16, 2021.        This report was finalized on 06/30/2021 10:19 by Dr. Tony Malik MD.            Assessment/Plan       Altered mental status    HTN (hypertension)    DM2 (diabetes mellitus, type 2) (CMS/HCC)    Chronic anemia    C. difficile diarrhea    Fever    Elevated troponin    Fx humeral neck,  right, with nonunion, subsequent encounter    Pleural effusion on right  CKD on HD      RECOMMENDATION:   ·  add flagyl iv  While awaiting speech eval  · Follow temp curve  ·  follow blood cultures   · Will monitor with you         Anisha Marina MD  07/01/21  07:47 CDT

## 2021-07-01 NOTE — PROGRESS NOTES
"    [facility] Medicine Services  INPATIENT PROGRESS NOTE    Length of Stay: 1  Date of Admission: 6/30/2021  Primary Care Physician: Bharati Dahl APRN    Subjective   Chief Complaint: Follow-up altered mental status  HPI   Patient resting in bed.  She is alert and oriented this morning.  She denies any chest pain or shortness of breath.  She denies cough or congestion.  She does have some right shoulder pain, but states it does not bother her unless \"someone is messing with it\".  She denies any abdominal pain, nausea, or vomiting.  She may have been having some diarrhea, she is unsure.    Review of Systems   All pertinent negatives and positives are as above. All other systems have been reviewed and are negative unless otherwise stated.     Objective    Temp:  [97.8 °F (36.6 °C)-100.1 °F (37.8 °C)] 97.8 °F (36.6 °C)  Heart Rate:  [] 72  Resp:  [15-21] 15  BP: (103-186)/(50-89) 111/59  Physical Exam  Vitals and nursing note reviewed.   Constitutional:       Appearance: She is ill-appearing (chronically ill appearing).   HENT:      Head: Normocephalic and atraumatic.   Cardiovascular:      Rate and Rhythm: Normal rate and regular rhythm.      Pulses: Normal pulses.      Heart sounds: Normal heart sounds.      Comments: Left upper extremity AV fistula  Pulmonary:      Effort: Pulmonary effort is normal.      Breath sounds: No wheezing, rhonchi or rales.      Comments: Diminished in bilateral bases  Abdominal:      General: Bowel sounds are normal. There is no distension.      Palpations: Abdomen is soft.      Tenderness: There is no abdominal tenderness.   Musculoskeletal:         General: No swelling or tenderness.      Cervical back: Normal range of motion and neck supple. No tenderness.   Skin:     General: Skin is warm and dry.      Findings: No erythema or rash.   Neurological:      General: No focal deficit present.      Mental Status: She is alert and oriented to person, " place, and time.   Psychiatric:         Mood and Affect: Mood normal.         Behavior: Behavior normal.         Thought Content: Thought content normal.         Judgment: Judgment normal.     Results Review:  I have reviewed the labs, radiology results, and diagnostic studies.    Laboratory Data:   Results from last 7 days   Lab Units 07/01/21  0242 06/30/21  1030   WBC 10*3/mm3 10.66 12.66*   HEMOGLOBIN g/dL 10.1* 10.8*   HEMATOCRIT % 33.7* 35.3   PLATELETS 10*3/mm3 199 255     Results from last 7 days   Lab Units 07/01/21  0242 06/30/21  1124   SODIUM mmol/L 135* 137   POTASSIUM mmol/L 4.8 4.2   CHLORIDE mmol/L 94* 94*   CO2 mmol/L 28.0 29.0   BUN mg/dL 31* 26*   CREATININE mg/dL 4.33* 3.75*   CALCIUM mg/dL 8.7 9.0   BILIRUBIN mg/dL  --  0.9   ALK PHOS U/L  --  314*   ALT (SGPT) U/L  --  6   AST (SGOT) U/L  --  15   GLUCOSE mg/dL 82 210*     I have reviewed the patient current medications.     Assessment/Plan     Active Hospital Problems    Diagnosis    • **Altered mental status    • Fever    • Elevated troponin    • Fx humeral neck, right, with nonunion, subsequent encounter    • Pleural effusion on right    • C. difficile diarrhea    • Chronic anemia    • DM2 (diabetes mellitus, type 2) (CMS/[facility])    • HTN (hypertension)      Plan:  1.  The patient presented to the emergency department from [facility] found with altered mental status and fever of 102 °F.  [facility] nursing [facility] also reports diarrhea.  2.  The patient had a recent admission to our facility from 6/16 through 6/24, presenting with altered mental status as well.  She was found to have C. difficile associated diarrhea as well as Pseudomonas bacteremia.  She was evaluated by infectious disease during that hospitalization and it was suspected her bacteremia was transient and did not need an extended course of treatment.  She was treated with a 5-day course of meropenem.  Her treatment for C. difficile with oral vancomycin  should have finished on June 28.  3.  Nephrology to follow, hemodialysis patient.  4.  C. difficile toxin is once again positive with negative GI panel.  Blood cultures are pending.  She was started on oral vancomycin on admission.  Infectious disease to evaluate.  5.  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  This has been known since at least March.  The patient has had several rehospitalizations and it is unknown if she has followed up with orthopedic surgery.  Have asked orthopedic surgery to evaluate while hospitalized.  6.  CT of the head in the emergency department showed new hypodensity of the right medial cerebellum, which could represent anterior abnormality or artifact.  MRI recommended, this has been ordered.  No focal deficits noted on exam.  7.  Troponin demonstrates a flat trend x4 values.  This is chronically elevated in the setting of end-stage renal disease.  No acute ST-T wave changes noted on EKG.  No complaints of chest pain.  No plans for any further cardiac work-up at this time unless she develops symptoms.  8.  Okay to transfer to the floor today.  9.  Labs in a.m.  10.   to follow to coordinate discharge planning back to skilled nursing facility.  PT/OT consults.    Discharge Planning: I expect the patient to be discharged to SNF in 2-3 days.    Electronically signed by ANTHONY George, 7/1/2021, 08:08 CDT.

## 2021-07-01 NOTE — CONSULTS
Nephrology ([facility]) Consult Note      Patient:  Mini Gentile  YOB: 1960  Date of Service: 7/1/2021  MRN: [id number]   Acct: [id number]   Primary Care Physician: Bharati Dahl APRN  Advance Directive:   Code Status and Medical Interventions:   Ordered at: 06/30/21 1620     Level Of Support Discussed With:    Patient     Code Status:    CPR     Medical Interventions (Level of Support Prior to Arrest):    Full     Admit Date: 6/30/2021       Hospital Day: 1  Referring Provider: No ref. provider found      Patient personally seen and examined.  Complete chart including Consults, Notes, Operative Reports, Labs, Cardiology, and Radiology studies reviewed as able.        Subjective:  Mini Gentile is a 61 y.o. female  whom we were consulted for ESRD and maintenance HD. Patient dialyzes in [facility] clinic on TTS.  She was recently hospitalized for C.Diff and pseudomonal bacteremia.  She was discharged back to   SNF on 6/24/21.  She then developed a fever of 102, diarrhea, ams, and right shoulder pain (has a known history of right shoulder fracture). Patient was seen in CCU but will be transferring to the floor today. Patient has no complaints at this time, requesting coffee.     Allergies:  Bupropion, Chantix [varenicline], Gabapentin, Azithromycin, Levofloxacin, Penicillins, and Sulfa antibiotics    Home Meds:  Medications Prior to Admission   Medication Sig Dispense Refill Last Dose   • acetaminophen (TYLENOL) 325 MG tablet Take 650 mg by mouth Every 4 (Four) Hours As Needed for Mild Pain  or Fever.      • albuterol sulfate  (90 Base) MCG/ACT inhaler Inhale 2 puffs Every 4 (Four) Hours As Needed for Wheezing. 18 g 0    • aspirin 81 MG EC tablet Take 81 mg by mouth Daily.      • atropine 1 % ophthalmic solution Administer 1 drop into the left eye Daily.      • brimonidine (ALPHAGAN) 0.2 % ophthalmic solution Administer 1 drop into the left eye 2 (two) times a day.       • carvedilol (COREG) 6.25 MG tablet Take 6.25 mg by mouth 2 (Two) Times a Day With Meals.      • dorzolamide (TRUSOPT) 2 % ophthalmic solution Administer 1 drop into the left eye 2 (Two) Times a Day.      • epoetin vika-epbx (RETACRIT) 02251 UNIT/ML injection Inject 1 mL under the skin into the appropriate area as directed 3 (Three) Times a Week. Indications: ESRD on Dialysis 6.6 mL     • famotidine (PEPCID) 20 MG tablet Take 1 tablet by mouth Daily.      • Ferric Citrate 1  MG(Fe) tablet Take 1 tablet by mouth 3 (Three) Times a Day With Meals.      • fluticasone (FLONASE) 50 MCG/ACT nasal spray 2 sprays into the nostril(s) as directed by provider 2 (Two) Times a Day.      • insulin lispro (humaLOG) 100 UNIT/ML injection Inject under the skin TID AC as per sliding scale:  200-249= 2 units  250-299= 4 units  300-349= 6 units  350-399= 8 units  400-500= 10 units and call MD      • ipratropium-albuterol (DUO-NEB) 0.5-2.5 mg/3 ml nebulizer Take 3 mL by nebulization Every 4 (Four) Hours As Needed for Wheezing or Shortness of Air.      • lactobacillus acidophilus (RISAQUAD) capsule capsule Take 1 capsule by mouth Daily. 30 capsule 2    • ondansetron (ZOFRAN) 4 MG tablet Take 1 tablet by mouth Every 6 (Six) Hours As Needed for Nausea or Vomiting. 24 tablet 2    • sertraline (ZOLOFT) 25 MG tablet Take 1 tablet by mouth Daily.      • timolol (TIMOPTIC) 0.5 % ophthalmic solution Administer 1 drop into the left eye Every Morning.  12    • [] vancomycin 50 MG/ML reconstituted solution oral solution reconstituted Take 2.5 mL by mouth Every 6 (Six) Hours for 18 doses. Indications: Clostridium Difficile Infection 45 mL 0        Medicines:  Current Facility-Administered Medications   Medication Dose Route Frequency Provider Last Rate Last Admin   • acetaminophen (TYLENOL) tablet 650 mg  650 mg Oral Q4H PRN Bin Guy,         Or   • acetaminophen (TYLENOL) suppository 650 mg  650 mg Rectal Q4H PRN Malena  Bin OTTO DO       • aspirin EC tablet 81 mg  81 mg Oral Daily Bin Guy DO   81 mg at 07/01/21 0848   • atropine 1 % ophthalmic solution 1 drop  1 drop Left Eye Daily Bin Guy DO   1 drop at 07/01/21 0847   • brimonidine (ALPHAGAN) 0.2 % ophthalmic solution 1 drop  1 drop Left Eye BID Bin Guy DO   1 drop at 07/01/21 0847   • carvedilol (COREG) tablet 6.25 mg  6.25 mg Oral BID With Meals Bin Guy DO   6.25 mg at 07/01/21 0848   • dextrose (D50W) 25 g/ 50mL Intravenous Solution 25 g  25 g Intravenous Q15 Min PRN Bin Guy DO       • dextrose (GLUTOSE) oral gel 15 g  15 g Oral Q15 Min PRN Bin Guy, DO       • famotidine (PEPCID) tablet 20 mg  20 mg Oral Daily Bin Guy DO   20 mg at 07/01/21 0847   • glucagon (human recombinant) (GLUCAGEN DIAGNOSTIC) injection 1 mg  1 mg Subcutaneous Q15 Min PRN Bin Guy DO       • heparin (porcine) 5000 UNIT/ML injection 5,000 Units  5,000 Units Subcutaneous Q12H Bni Guy DO   5,000 Units at 07/01/21 0847   • insulin regular (humuLIN R,novoLIN R) injection 2-7 Units  2-7 Units Subcutaneous Q6H Bin Guy DO   2 Units at 06/30/21 1831   • lactobacillus acidophilus (RISAQUAD) capsule 1 capsule  1 capsule Oral Daily Bin Guy DO   1 capsule at 07/01/21 0848   • metroNIDAZOLE (FLAGYL) 500 mg/100mL IVPB  500 mg Intravenous Q8H Anisha Marina MD       • ondansetron (ZOFRAN) injection 4 mg  4 mg Intravenous Q6H PRN Bin Guy DO       • pravastatin (PRAVACHOL) tablet 10 mg  10 mg Oral Nightly Bin Guy, DO       • sertraline (ZOLOFT) tablet 25 mg  25 mg Oral Daily Bin Guy DO   25 mg at 07/01/21 0848   • sodium chloride 0.9 % flush 10 mL  10 mL Intravenous PRN Jensen Michele MD       • sodium chloride 0.9 % flush 10 mL  10 mL Intravenous Q12H Bin Guy DO   10 mL at 07/01/21 0847   • sodium chloride 0.9 % flush 10 mL  10 mL Intravenous PRN Malena  Bin OTTO DO       • timolol (TIMOPTIC) 0.5 % ophthalmic solution 1 drop  1 drop Left Eye QAM Bin Guy DO   1 drop at 21 0847   • vancomycin oral solution 125 mg  125 mg Oral Q6H Bin Guy DO   Stopped at 21 1834       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    • Chronic otitis media    • Diabetes (CMS/[facility])    • End stage renal disease on dialysis (CMS/[facility])    • Eustachian tube dysfunction    • GERD (gastroesophageal reflux disease)    • Glaucoma    • HTN (hypertension)    • Hyperlipidemia    • Mucoid otitis media    • Noncompliance of patient with renal dialysis (CMS/[facility])    • Tobacco abuse        Past Surgical History:  Past Surgical History:   Procedure Laterality Date   • ARTERIOVENOUS FISTULA     • CATARACT EXTRACTION WITH INTRAOCULAR LENS IMPLANT     •  SECTION     • CHOLECYSTECTOMY     • MYRINGOTOMY W/ TUBES Bilateral    • MYRINGOTOMY W/ TUBES Right    • TUBAL ABDOMINAL LIGATION         Family History  Family History   Problem Relation Age of Onset   • Heart disease Mother    • Diabetes Father    • Colon cancer Neg Hx    • Colon polyps Neg Hx        Social History  Social History     Socioeconomic History   • Marital status: Single     Spouse name: Not on file   • Number of children: Not on file   • Years of education: Not on file   • Highest education level: Not on file   Tobacco Use   • Smoking status: Current Every Day Smoker     Packs/day: 2.00     Years: 6.00     Pack years: 12.00     Types: Cigarettes   • Smokeless tobacco: Never Used   Vaping Use   • Vaping Use: Never used   Substance and Sexual Activity   • Alcohol use: No   • Drug use: No   • Sexual activity: Defer         Review of Systems:  History obtained from chart review and the patient  General ROS: No fever or chills  Respiratory ROS: No cough, shortness of breath, wheezing  Cardiovascular ROS: No chest pain or  palpitations  Gastrointestinal ROS: positive for - diarrhea  negative for - abdominal pain  Genito-Urinary ROS: No dysuria or hematuria  14 point ROS reviewed with the patient and negative except as noted above and in the HPI unless unable to obtain.    Objective:  Patient Vitals for the past 24 hrs:   BP Temp Temp src Pulse Resp SpO2 Height Weight   07/01/21 0845 -- 96.3 °F (35.7 °C) Axillary -- -- -- -- --   07/01/21 0715 111/59 -- -- 72 -- 92 % -- --   07/01/21 0700 126/59 -- -- 73 -- 93 % -- --   07/01/21 0645 114/54 -- -- 69 -- 97 % -- --   07/01/21 0630 124/58 -- -- 72 -- 98 % -- --   07/01/21 0615 134/56 -- -- 77 -- 99 % -- --   07/01/21 0600 136/58 -- -- 75 -- 100 % -- --   07/01/21 0545 126/54 -- -- 69 -- 98 % -- --   07/01/21 0530 111/62 -- -- 69 -- 98 % -- --   07/01/21 0515 126/58 -- -- 74 -- 99 % -- --   07/01/21 0500 107/53 -- -- 72 -- 100 % -- --   07/01/21 0445 106/58 -- -- 70 -- 98 % -- --   07/01/21 0430 120/60 -- -- 69 -- 98 % -- --   07/01/21 0415 120/57 -- -- 70 -- 99 % -- --   07/01/21 0400 111/59 97.8 °F (36.6 °C) -- 71 -- 98 % -- --   07/01/21 0345 111/53 -- -- 74 -- 96 % -- --   07/01/21 0330 129/59 -- -- 74 -- 99 % -- --   07/01/21 0315 114/52 -- -- 72 -- 100 % -- --   07/01/21 0300 103/58 -- -- 69 -- 99 % -- --   07/01/21 0245 119/51 -- -- 72 -- 99 % -- --   07/01/21 0230 127/61 -- -- 73 -- 99 % -- --   07/01/21 0215 122/59 -- -- 73 -- 100 % -- --   07/01/21 0200 122/52 -- -- 71 -- 100 % -- 65.3 kg (143 lb 15.4 oz)   07/01/21 0145 114/58 -- -- 73 -- 99 % -- --   07/01/21 0130 108/55 -- -- 76 -- 92 % -- --   07/01/21 0115 113/55 -- -- 73 -- 93 % -- --   07/01/21 0100 112/58 -- -- 73 -- 93 % -- --   07/01/21 0045 115/56 -- -- 73 -- 99 % -- --   07/01/21 0030 106/56 -- -- 74 -- 99 % -- --   07/01/21 0015 115/68 -- -- 74 -- 99 % -- --   07/01/21 0000 113/54 98.3 °F (36.8 °C) -- 76 15 98 % -- --   06/30/21 2345 142/64 -- -- 83 -- 100 % -- --   06/30/21 2330 107/66 -- -- 80 -- 100 % -- --  "  06/30/21 2315 121/58 -- -- 77 -- 99 % -- --   06/30/21 2300 111/55 -- -- 76 -- 95 % -- --   06/30/21 2245 122/57 -- -- 80 -- 94 % -- --   06/30/21 2230 119/58 -- -- 78 -- 93 % -- --   06/30/21 2215 114/56 -- -- 82 -- 93 % -- --   06/30/21 2200 121/55 -- -- 79 -- 93 % -- --   06/30/21 2145 131/71 -- -- 87 -- 98 % -- --   06/30/21 2130 116/51 -- -- 81 -- 98 % -- --   06/30/21 2115 112/50 -- -- 78 -- 97 % -- --   06/30/21 2100 117/55 -- -- 80 -- 99 % -- --   06/30/21 2045 117/63 -- -- 86 -- 91 % -- --   06/30/21 2030 131/60 -- -- 88 -- 98 % -- --   06/30/21 2015 137/66 -- -- 88 -- 99 % -- --   06/30/21 2000 119/60 98.6 °F (37 °C) Oral 82 -- 99 % -- --   06/30/21 1945 119/58 -- -- 80 -- 98 % -- --   06/30/21 1930 116/56 -- -- 88 -- 97 % -- --   06/30/21 1915 118/89 -- -- 86 -- 97 % -- --   06/30/21 1900 116/72 -- -- 88 -- 94 % -- --   06/30/21 1830 130/61 -- -- 88 -- 98 % -- --   06/30/21 1815 122/62 -- -- 88 -- 98 % -- --   06/30/21 1800 128/56 -- -- 88 -- 97 % -- --   06/30/21 1745 123/56 -- -- 92 -- 96 % -- --   06/30/21 1730 115/54 -- -- 91 -- 97 % -- --   06/30/21 1715 131/56 -- -- 95 -- 97 % -- --   06/30/21 1700 117/61 -- -- 91 -- 97 % -- --   06/30/21 1645 118/58 -- -- 93 -- 97 % -- --   06/30/21 1630 136/55 -- -- 93 -- 97 % -- --   06/30/21 1615 146/66 -- -- 97 -- 98 % -- --   06/30/21 1600 142/64 -- -- 99 -- 97 % -- --   06/30/21 1545 144/69 99.2 °F (37.3 °C) Oral 96 -- 96 % 147.3 cm (58\") 67.6 kg (149 lb 0.5 oz)   06/30/21 1422 -- 98.2 °F (36.8 °C) Oral -- -- -- -- --   06/30/21 1400 -- -- -- 99 -- 97 % -- --   06/30/21 1346 -- -- -- 99 -- 98 % -- --   06/30/21 1330 154/55 -- -- 101 -- 98 % -- --   06/30/21 1315 -- -- -- 104 -- 97 % -- --   06/30/21 1300 157/66 -- -- 104 -- 96 % -- --   06/30/21 1245 145/84 -- -- 105 -- 97 % -- --   06/30/21 1230 120/75 -- -- 106 -- 97 % -- --   06/30/21 1215 159/71 -- -- 107 -- 97 % -- --   06/30/21 1200 157/70 -- -- 108 -- 96 % -- --   06/30/21 1155 159/65 -- -- 107 -- 96 " "% -- --   06/30/21 1130 147/59 -- -- 105 -- 97 % -- --   06/30/21 1115 162/65 -- -- 105 -- 97 % -- --   06/30/21 1100 148/58 -- -- 105 -- 90 % -- --   06/30/21 1045 159/58 -- -- 107 -- 97 % -- --   06/30/21 1030 168/59 -- -- 108 -- 94 % -- --   06/30/21 1000 (!) 184/83 -- -- 116 -- 95 % -- --   06/30/21 0955 (!) 186/80 -- -- -- -- -- -- --   06/30/21 0951 -- -- -- -- -- -- -- 61.7 kg (136 lb)   06/30/21 0950 -- -- -- -- -- -- 147.3 cm (58\") --   06/30/21 0946 (!) 184/83 100.1 °F (37.8 °C) Oral 117 21 96 % -- --       Intake/Output Summary (Last 24 hours) at 7/1/2021 0923  Last data filed at 6/30/2021 1224  Gross per 24 hour   Intake 100 ml   Output --   Net 100 ml     General: awake/alert OX2  Chest:  diminished breath sounds  CVS: regular rate and rhythm  Abdominal: soft, nontender, positive bowel sounds  Extremities: LUE AVF and no cyanosis or edema  Skin: warm and dry without rash      Labs:  Results from last 7 days   Lab Units 07/01/21  0242 06/30/21  1030   WBC 10*3/mm3 10.66 12.66*   HEMOGLOBIN g/dL 10.1* 10.8*   HEMATOCRIT % 33.7* 35.3   PLATELETS 10*3/mm3 199 255         Results from last 7 days   Lab Units 07/01/21  0242 06/30/21  1124   SODIUM mmol/L 135* 137   POTASSIUM mmol/L 4.8 4.2   CHLORIDE mmol/L 94* 94*   CO2 mmol/L 28.0 29.0   BUN mg/dL 31* 26*   CREATININE mg/dL 4.33* 3.75*   CALCIUM mg/dL 8.7 9.0   BILIRUBIN mg/dL  --  0.9   ALK PHOS U/L  --  314*   ALT (SGPT) U/L  --  6   AST (SGOT) U/L  --  15   GLUCOSE mg/dL 82 210*       Radiology:   Imaging Results (Last 72 Hours)     Procedure Component Value Units Date/Time    CT Head Without Contrast [[id number]] Collected: 06/30/21 1158     Updated: 06/30/21 1203    Narrative:      EXAM: CT HEAD WO CONTRAST- - 6/30/2021 11:52 AM CDT     HISTORY: Altered mental status       COMPARISON: 6/16/2021.      DOSE LENGTH PRODUCT: 1516 mGy cm. Automated exposure control was also  utilized to decrease patient radiation dose.     TECHNIQUE: Unenhanced CT images " obtained from vertex to skull base with  multiplanar reformats.     FINDINGS:  New hypodensity at the right medial cerebellum.     Ventricles, basilar cisterns and sulci have proportional prominence  suggesting volume loss. Periventricular and subcortical white matter  hypodensities, most likely due to chronic microvascular disease.     Postoperative changes to the left eye. Calvarium appears intact.  Subcutaneous tissues within normal limits.          Impression:      1. New hypodensity at the right medial cerebellum, could represent a  true abnormality or artifact. Recommend MRI for further evaluation.   2. Similar chronic microvascular changes and volume loss.  This report was finalized on 06/30/2021 12:00 by Dr Carline Mcgovern MD.    XR Shoulder 2+ View Right [[id number]] Collected: 06/30/21 1021     Updated: 06/30/21 1026    Narrative:      Right shoulder, 3 views 6/30/2021 10:03 AM CDT     History: Right shoulder tenderness     Comparison: March 26, 2021      Findings:   Internal rotation, and external rotation views of the right shoulder are  submitted.      There is nonunion of the previously described comminuted fracture the  head and neck of the right humerus. The skeletal structures are not  aligned. There is been partial resorption of the humeral head. The AC  joint is maintained.. Moderate right pleural effusion is present..        Impression:      Impression:   1. 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        This report was finalized on 06/30/2021 10:22 by Dr. Tony Malik MD.    XR Chest 1 View [[id number]] Collected: 06/30/21 1018     Updated: 06/30/21 1022    Narrative:      EXAM: XR CHEST 1 VW- 6/30/2021 10:03 AM CDT     HISTORY: ams       COMPARISON: June 16, 2021.     TECHNIQUE: Frontal radiograph of the chest     FINDINGS:   Large right lateral pleural effusion is present. This is similar to June 16, 2021. Left lung is clear..  Cardiac silhouettes moderately enlarged..        The osseous structures and surrounding soft tissues demonstrate no acute  abnormality.          Impression:      1. Large right lateral pleural effusion unchanged from June 16, 2021.        This report was finalized on 06/30/2021 10:19 by Dr. Tony Malik MD.          Culture:  No results found for: BLOODCX, URINECX, WOUNDCX, MRSACX, RESPCX, STOOLCX      Assessment   ESRD maintenance HD on TTS ([facility])  C.Diff diarrhea  AMS  Fever  Right pleural effusion  DM2  HTN  Anemia of CKD    Plan:  Patient will dialyze today  Monitor labs     Thank you for the consult, we appreciate the opportunity to provide care to your patients.  Feel free to contact us if we can be of any further assistance.      Toma Casas, APRN  7/1/2021  09:23 CDT

## 2021-07-01 NOTE — PLAN OF CARE
Goal Outcome Evaluation:  Plan of Care Reviewed With: patient        Progress: improving  Outcome Summary: pt no c/o pain. pt alert and oriented, but forgetful. pt c-diff+. isolation protocol in place. pt trop trending down. pt npo, plan for swallow study this am. vss. pt resting comfortably

## 2021-07-02 NOTE — PLAN OF CARE
"  Problem: Adult Inpatient Plan of Care  Goal: Plan of Care Review  Outcome: Ongoing, Progressing  Flowsheets (Taken 7/2/2021 0322)  Progress: no change  Plan of Care Reviewed With: patient  Outcome Summary: PT A&Ox4 but can be confused at times. Was agitated with staff at the beginning of the shift because she wanted to leave to smoke a cigarette, when offered a nicotine patch the patient refused. External collection tube in place for very frequent loose stools. C/o legs hurting during the night, because they were \"itchy\", lotion applied to pts legs that gave some relief. , Tele NSR. Q6 accu check. Bed alarm set, call light in reach, safety maintained.    "

## 2021-07-02 NOTE — PROGRESS NOTES
"Infectious Diseases Progress Note    Patient:  Mini Gentile  YOB: 1960  MRN: [id number]   Admit date: 6/30/2021   Admitting Physician: Bin Guy DO  Primary Care Physician: Bharati Dahl APRN    Chief Complaint/Interval History: She is feeling better.  She is sitting up at the bedside.  She is eating dinner.  She reports no nausea or vomiting.  No abdominal pain.  No fever.  She feels stool is becoming more formed.    Intake/Output Summary (Last 24 hours) at 7/2/2021 1650  Last data filed at 7/2/2021 1100  Gross per 24 hour   Intake 120 ml   Output 1700 ml   Net -1580 ml     Allergies:   Allergies   Allergen Reactions   • Bupropion Nausea Only   • Chantix [Varenicline] Nausea And Vomiting     Does not remember   • Gabapentin Hallucinations     hallucinations   • Azithromycin Rash   • Levofloxacin Rash   • Penicillins Rash   • Sulfa Antibiotics Rash     Current Scheduled Medications:   aspirin, 81 mg, Oral, Daily  atropine, 1 drop, Left Eye, Daily  brimonidine, 1 drop, Left Eye, BID  carvedilol, 6.25 mg, Oral, BID With Meals  famotidine, 20 mg, Oral, Daily  heparin (porcine), 5,000 Units, Subcutaneous, Q12H  insulin regular, 2-7 Units, Subcutaneous, Q6H  nicotine, 1 patch, Transdermal, Q24H  pravastatin, 10 mg, Oral, Nightly  saccharomyces boulardii, 500 mg, Oral, BID  sertraline, 25 mg, Oral, Daily  sodium chloride, 10 mL, Intravenous, Q12H  timolol, 1 drop, Left Eye, QAM  vancomycin, 125 mg, Oral, Q6H      Current PRN Medications:  •  acetaminophen **OR** acetaminophen  •  dextrose  •  dextrose  •  glucagon (human recombinant)  •  ondansetron  •  [COMPLETED] Insert peripheral IV **AND** sodium chloride  •  sodium chloride    Review of Systems no cough or shortness of breath    Vital Signs:  /69 (BP Location: Right arm, Patient Position: Sitting)   Pulse 68   Temp 97.7 °F (36.5 °C) (Oral)   Resp 18   Ht 147.3 cm (58\")   Wt 63.6 kg (140 lb 3.4 oz)   SpO2 99%   BMI 29.30 " kg/m²     Physical Exam  Vital signs - reviewed.  Abdomen soft and nontender.  No guarding or rebound.  Lungs without crackles    Lab Results:  CBC:   Results from last 7 days   Lab Units 07/02/21  0440 07/01/21  0242 06/30/21  1030   WBC 10*3/mm3 6.40 10.66 12.66*   HEMOGLOBIN g/dL 10.0* 10.1* 10.8*   HEMATOCRIT % 33.7* 33.7* 35.3   PLATELETS 10*3/mm3 188 199 255     BMP:  Results from last 7 days   Lab Units 07/02/21  0440 07/01/21  0242 06/30/21  1124   SODIUM mmol/L 136 135* 137   POTASSIUM mmol/L 4.4 4.8 4.2   CHLORIDE mmol/L 96* 94* 94*   CO2 mmol/L 30.0* 28.0 29.0   BUN mg/dL 23 31* 26*   CREATININE mg/dL 3.24* 4.33* 3.75*   GLUCOSE mg/dL 139* 82 210*   CALCIUM mg/dL 8.8 8.7 9.0   ALT (SGPT) U/L  --   --  6     Culture Results:   Blood Culture   Date Value Ref Range Status   06/30/2021 No growth at 2 days  Preliminary   06/30/2021 No growth at 2 days  Preliminary     Radiology: None  Additional Studies Reviewed: None    Impression:   C. difficile diarrhea-improving    Recommendations:   Continue current treatment  Continue to follow    Fabio Gomez MD

## 2021-07-02 NOTE — PLAN OF CARE
Goal Outcome Evaluation:  Plan of Care Reviewed With: patient        Progress: no change  Outcome Summary: Pt disoriented to time today - orientation varies, confused at times. PPP. Denies n/t. MONSALVE. up x1, walker. external fecal managment system in place. r/b bottom, PI coccyx. VTE - ASA, heparin. LBM 7/2. no sticks/BP L arm. L arm fistula. C-diff - contact precautions maintained. Tele - normal sinus. , room air. Pt has been sitting up in the chair for most of the day. Call light within reach. Safety maintained. Blood glucose monitored.

## 2021-07-02 NOTE — PLAN OF CARE
Goal Outcome Evaluation:  Plan of Care Reviewed With: patient        Progress: no change  Outcome Summary: OT eval completed. Pt awake and alert sitting up EOB. External collection device in place but is leaking, CGA to stand and maxA for hygiene. Waffle cushion placed in chair and pt CGA to take a few steps to chair. Unable to scoot self back in chair 2' weakness and pain. Pt demos gross weakness and decreased endurance, OT indicated to address to increase independence and safety in context of ADL/IADL and fxl mobility/transfers. Recommend d/c SNF.

## 2021-07-02 NOTE — THERAPY EVALUATION
Patient Name: Mini Gentile  : 1960    MRN: [id number]                              Today's Date: 2021       Admit Date: 2021    Visit Dx:     ICD-10-CM ICD-9-CM   1. Altered mental status, unspecified altered mental status type  R41.82 780.97   2. Sepsis, due to unspecified organism, unspecified whether acute organ dysfunction present (CMS/[facility])  A41.9 038.9     995.91   3. Pleural effusion  J90 511.9   4. Dysphagia, unspecified type  R13.10 787.20   5. 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   • Eustachian tube dysfunction   • Chronic otitis media   • Pneumonia of left upper lobe due to Streptococcus (CMS/[facility])   • End stage renal failure on dialysis (CMS/[facility])   • Diabetes (CMS/[facility])   • Glaucoma   • HTN (hypertension)   • Proteinuria   • DM2 (diabetes mellitus, type 2) (CMS/[facility])   • Chronic anemia   • Acute pulmonary edema (CMS/[facility])   • Non-compliance with renal dialysis (CMS/[facility])   • Elevated troponin due to ESRD    • Hyperkalemia   • Encephalopathy, metabolic, due to uremia   • High anion gap metabolic acidosis due to uremia   • Respiratory distress   • Acute diastolic heart failure (CMS/[facility])   • Lymph node enlargement, left axillary measuring 1.1 cm -follow-up for primary care   • Nausea vomiting and diarrhea   • Acute diastolic heart failure (CMS/[facility])   • Diabetes mellitus with ophthalmic complication (CMS/[facility])   • Tobacco abuse   • Urinary tract infection with hematuria   • Pneumonia due to infectious organism   • Abnormal urine findings   • Closed fracture of right proximal humerus   • Hyponatremia   • Hypothyroidism (acquired)   • Forehead laceration secondary to fall   • Heme positive stool   • Dialysis AV fistula malfunction, initial encounter (CMS/[facility])   • Aspiration into airway   • Bronchospasm, acute   • Altered mental status   • Protein-calorie malnutrition (CMS/[facility])   •  Pupil irregular   • Sepsis (CMS/[facility])   • Hypoglycemia   • Hyponatremia   • Diarrhea   • Hypokalemia   • C. 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    • Chronic otitis media    • Diabetes (CMS/[facility])    • End stage renal disease on dialysis (CMS/[facility])    • Eustachian tube dysfunction    • GERD (gastroesophageal reflux disease)    • Glaucoma    • HTN (hypertension)    • Hyperlipidemia    • Mucoid otitis media    • Noncompliance of patient with renal dialysis (CMS/[facility])    • Tobacco abuse      Past Surgical History:   Procedure Laterality Date   • ARTERIOVENOUS FISTULA     • CATARACT EXTRACTION WITH INTRAOCULAR LENS IMPLANT     •  SECTION     • CHOLECYSTECTOMY     • MYRINGOTOMY W/ TUBES Bilateral    • MYRINGOTOMY W/ TUBES Right    • TUBAL ABDOMINAL LIGATION       General Information     Row Name 2142          OT Time and Intention    Document Type  evaluation  -MW     Mode of Treatment  occupational therapy  -MW     Row Name 21          General Information    Patient Profile Reviewed  yes  -MW     Prior Level of Function  min assist:;all household mobility;transfer;ADL's reports was getting therapy at facility and cleared to walk with RW, has not been seen by ortho in follow up from March with humerus fx  -MW     Existing Precautions/Restrictions  fall c. diff  -MW     Barriers to Rehab  medically complex;physical barrier  -MW     Row Name 21          Occupational Profile    Reason for Services/Referral (Occupational Profile)  presented with fever and AMS; Dx: AMS, pleural effusion, c. diff  -MW     Row Name 2142          Living Environment    Lives With  facility resident  -MW     Row Name 21          Cognition    Orientation Status  (Cognition)  oriented x 4  -     Row Name 07/02/21 0742          Safety Issues, Functional Mobility    Impairments Affecting Function (Mobility)  endurance/activity tolerance;strength;pain  -       User Key  (r) = Recorded By, (t) = Taken By, (c) = Cosigned By    Initials Name Provider Type     Sarai Shabazz, OTR/L Occupational Therapist          Mobility/ADL's     Row Name 07/02/21 0742          Bed Mobility    Comment (Bed Mobility)  sitting EOB upon entering room  -[facility] Name 07/02/21 0742          Transfers    Transfers  sit-stand transfer  -     Sit-Stand [place] (Transfers)  contact guard;verbal cues  -[facility] Name 07/02/21 0742          Functional Mobility    Functional Mobility- Ind. Level  contact guard assist  -     Functional Mobility- Comment  took a couple steps bed to chair and maintained standing x5 min for hygiene  -[facility] Name 07/02/21 0742          Activities of Daily Living    BADL Assessment/Intervention  toileting  -[facility] Name 07/02/21 0742          Mobility    Extremity Weight-bearing Status  right upper extremity  -     Right Upper Extremity (Weight-bearing Status)  -- pt reports she has been cleared for RW use but has not followed up with ortho since fx in March  -[facility] Name 07/02/21 0742          Toileting Assessment/Training    [place] Level (Toileting)  toileting skills;maximum assist (25% patient effort)  -     Position (Toileting)  supported standing  -     Comment (Toileting)  hygiene following incontinence, external rectal bag in place but not well intact, RN notified  -       User Key  (r) = Recorded By, (t) = Taken By, (c) = Cosigned By    Initials Name Provider Type     Sarai Shabazz, OTR/L Occupational Therapist        Obj/Interventions     Row Name 07/02/21 0742          Range of Motion Comprehensive    Comment, General Range of Motion  AROM WFL LUE, WFL R UE distal to shoulder  -     Row Name 07/02/21 0742           Strength Comprehensive (MMT)    Comment, General Manual Muscle Testing (MMT) Assessment  LUE 4+/5, deferred RUE  -[facility] Name 07/02/21 0742          Balance    Balance Assessment  sitting static balance;sitting dynamic balance;standing static balance;standing dynamic balance  -MW     Static Sitting Balance  WFL  -MW     Dynamic Sitting Balance  WFL  -MW     Static Standing Balance  WFL  -MW     Dynamic Standing Balance  mild impairment  -MW       User Key  (r) = Recorded By, (t) = Taken By, (c) = Cosigned By    Initials Name Provider Type    MW Sarai Shabazz, OTR/L Occupational Therapist        Goals/Plan     Row Name 07/02/21 0742          Transfer Goal 1 (OT)    Activity/Assistive Device (Transfer Goal 1, OT)  sit-to-stand/stand-to-sit;bed-to-chair/chair-to-bed;toilet;shower chair  -MW     [place] Level/Cues Needed (Transfer Goal 1, OT)  supervision required  -MW     Time Frame (Transfer Goal 1, OT)  long term goal (LTG);10 days  -MW     Progress/Outcome (Transfer Goal 1, OT)  goal ongoing  -MW     Row Name 07/02/21 0742          Bathing Goal 1 (OT)    Activity/Device (Bathing Goal 1, OT)  bathing skills, all  -MW     [place] Level/Cues Needed (Bathing Goal 1, OT)  minimum assist (75% or more patient effort)  -MW     Time Frame (Bathing Goal 1, OT)  long term goal (LTG);10 days  -MW     Progress/Outcomes (Bathing Goal 1, OT)  goal ongoing  -MW     Row Name 07/02/21 0742          Dressing Goal 1 (OT)    Activity/Device (Dressing Goal 1, OT)  lower body dressing;upper body dressing  -MW     [place]/Cues Needed (Dressing Goal 1, OT)  minimum assist (75% or more patient effort)  -MW     Time Frame (Dressing Goal 1, OT)  long term goal (LTG);10 days  -MW     Progress/Outcome (Dressing Goal 1, OT)  goal ongoing  -MW     Row Name 07/02/21 0742          Toileting Goal 1 (OT)    Activity/Device (Toileting Goal 1, OT)  toileting skills, all;commode, 3-in-1  -MW     [place] Level/Cues Needed  (Toileting Goal 1, OT)  minimum assist (75% or more patient effort)  -MW     Time Frame (Toileting Goal 1, OT)  long term goal (LTG);10 days  -MW     Progress/Outcome (Toileting Goal 1, OT)  goal ongoing  -MW     Row Name 07/02/21 0742          Therapy Assessment/Plan (OT)    Planned Therapy Interventions (OT)  activity tolerance training;BADL retraining;functional balance retraining;IADL retraining;occupation/activity based interventions;patient/caregiver education/training;strengthening exercise;transfer/mobility retraining  -MW       User Key  (r) = Recorded By, (t) = Taken By, (c) = Cosigned By    Initials Name Provider Type    MW Sarai Shabazz, OTR/L Occupational Therapist        Clinical Impression     Row Name 07/02/21 0742          Pain Assessment    Additional Documentation  Pain Scale: Numbers Pre/Post-Treatment (Group)  -     Row Name 07/02/21 0742          Pain Scale: Numbers Pre/Post-Treatment    Pretreatment Pain Rating  0/10 - no pain  -MW     Posttreatment Pain Rating  -- Faces: 4-6 at end of session in R shoulder  -MW     Pain Intervention(s)  Repositioned;Ambulation/increased activity  -MW     Row Name 07/02/21 0742          Plan of Care Review    Plan of Care Reviewed With  patient  -MW     Progress  no change  -MW     Outcome Summary  OT eval completed. Pt awake and alert sitting up EOB. External collection device in place but is leaking, CGA to stand and maxA for hygiene. Waffle cushion placed in chair and pt CGA to take a few steps to chair. Unable to scoot self back in chair 2' weakness and pain. Pt demos gross weakness and decreased endurance, OT indicated to address to increase independence and safety in context of ADL/IADL and fxl mobility/transfers. Recommend d/c SNF.  -MW     Row Name 07/02/21 0742          Therapy Assessment/Plan (OT)    Rehab Potential (OT)  good, to achieve stated therapy goals  -     Criteria for Skilled Therapeutic Interventions Met (OT)  yes;skilled treatment  is necessary  -     Therapy Frequency (OT)  3 times/wk  -MW     Predicted Duration of Therapy Intervention (OT)  10 days  -     Row Name 07/02/21 0742          Therapy Plan Review/Discharge Plan (OT)    Anticipated Discharge Disposition (OT)  skilled nursing facility  -     Row Name 07/02/21 0742          Positioning and Restraints    Pre-Treatment Position  in bed  -     Post Treatment Position  chair  -MW     In Chair  reclined;call light within reach;encouraged to call for assist;notified nsg;legs elevated;waffle cushion  -       User Key  (r) = Recorded By, (t) = Taken By, (c) = Cosigned By    Initials Name Provider Type    Sarai Blair, OTR/L Occupational Therapist        Outcome Measures     Row Name 07/02/21 0742          How much help from another is currently needed...    Putting on and taking off regular lower body clothing?  2  -MW     Bathing (including washing, rinsing, and drying)  2  -MW     Toileting (which includes using toilet bed pan or urinal)  2  -MW     Putting on and taking off regular upper body clothing  3  -MW     Taking care of personal grooming (such as brushing teeth)  4  -MW     Eating meals  4  -MW     AM-PAC 6 Clicks Score (OT)  17  -MW     Row Name 07/02/21 0742          Functional Assessment    Outcome Measure Options  AM-PAC 6 Clicks Daily Activity (OT)  -       User Key  (r) = Recorded By, (t) = Taken By, (c) = Cosigned By    Initials Name Provider Type    Sarai Blair, OTR/L Occupational Therapist        Occupational Therapy Education                 Title: PT OT SLP Therapies (Done)     Topic: Occupational Therapy (Done)     Point: ADL training (Done)     Description:   Instruct learner(s) on proper safety adaptation and remediation techniques during self care or transfers.   Instruct in proper use of assistive devices.              Learning Progress Summary           Patient Acceptance, E,D, VU,NR by SHANDRA at 7/2/2021 0883                   Point: Home  exercise program (Done)     Description:   Instruct learner(s) on appropriate technique for monitoring, assisting and/or progressing therapeutic exercises/activities.              Learning Progress Summary           Patient Acceptance, E,D, VU,NR by  at 7/2/2021 0835                               User Key     Initials Effective Dates Name Provider Type Discipline     08/28/18 -  Sarai Shabazz, OTR/L Occupational Therapist OT              OT Recommendation and Plan  Planned Therapy Interventions (OT): activity tolerance training, BADL retraining, functional balance retraining, IADL retraining, occupation/activity based interventions, patient/caregiver education/training, strengthening exercise, transfer/mobility retraining  Therapy Frequency (OT): 3 times/wk  Plan of Care Review  Plan of Care Reviewed With: patient  Progress: no change  Outcome Summary: OT eval completed. Pt awake and alert sitting up EOB. External collection device in place but is leaking, CGA to stand and maxA for hygiene. Waffle cushion placed in chair and pt CGA to take a few steps to chair. Unable to scoot self back in chair 2' weakness and pain. Pt demos gross weakness and decreased endurance, OT indicated to address to increase independence and safety in context of ADL/IADL and fxl mobility/transfers. Recommend d/c SNF.     Time Calculation:   Time Calculation- OT     Row Name 07/02/21 0835             Time Calculation- OT    OT Start Time  0740 +8 min chart review  -      OT Stop Time  0820  -      OT Time Calculation (min)  40 min  -      OT Received On  07/02/21  -      OT Goal Re-Cert Due Date  07/12/21  -        User Key  (r) = Recorded By, (t) = Taken By, (c) = Cosigned By    Initials Name Provider Type     Sarai Shabazz, OTR/L Occupational Therapist        Therapy Charges for Today     Code Description Service Date Service Provider Modifiers Qty    [id number]  OT EVAL LOW COMPLEXITY 3 7/2/2021 Sarai Shabazz,  OTR/L GO 1               Sarai Shabazz, OTR/L  7/2/2021

## 2021-07-02 NOTE — PROGRESS NOTES
Continued Stay Note  [facility]     Patient Name: Mini Gentile  MRN: [id number]  Today's Date: 7/2/2021    Admit Date: 6/30/2021    Discharge Plan     Row Name 07/02/21 1423       Plan    Plan Comments  Precert was started this morning for pt to return to [facility]. Notified Neetu at [facility] and pt chair time remains the same (T/Th/Sat) at [facility]. Await insurance decision.    Final Discharge Disposition Code  03 - skilled nursing facility (SNF)        Discharge Codes    No documentation.             MICA Concepcion

## 2021-07-02 NOTE — PROGRESS NOTES
PROGRESS NOTE.      Patient:  Mini Gentile  YOB: 1960  Date of Service: 7/2/2021  MRN: [id number]   Acct: [id number]   Primary Care Physician: Bharati Dahl APRN  Advance Directive:   Code Status and Medical Interventions:   Ordered at: 06/30/21 1620     Level Of Support Discussed With:    Patient     Code Status:    CPR     Medical Interventions (Level of Support Prior to Arrest):    Full     Admit Date: 6/30/2021       Hospital Day: 2  Referring Provider: No ref. provider found      Patient Seen, Chart, Consults, Notes, Labs, Radiology studies reviewed.      Subjective:  Mini Gentile is a 61 y.o. female  whom we were consulted for ESRD and maintenance HD. Patient dialyzes in  clinic on TTS.  She was recently hospitalized for C.Diff and pseudomonal bacteremia.  She was discharged back to SNF on 6/24/21.  She then developed a fever of 102, diarrhea, ams, and right shoulder pain (has a known history of right shoulder fracture). Patient was first admitted to CCU but was then transferred to the floor .  Patient's main complaint was diarrhea.  She is status post dialysis today.  Patient is also feeling better.    Allergies:  Bupropion, Chantix [varenicline], Gabapentin, Azithromycin, Levofloxacin, Penicillins, and Sulfa antibiotics    Home Meds:  Medications Prior to Admission   Medication Sig Dispense Refill Last Dose   • aspirin 81 MG chewable tablet Chew 81 mg Daily.   6/29/2021 at Unknown time   • atropine 1 % ophthalmic solution Administer 1 drop into the left eye Daily.   6/29/2021 at Unknown time   • brimonidine (ALPHAGAN) 0.2 % ophthalmic solution Administer 1 drop into the left eye 2 (two) times a day.   6/29/2021 at Unknown time   • carvedilol (COREG) 6.25 MG tablet Take 6.25 mg by mouth 2 (Two) Times a Day With Meals.   6/29/2021 at Unknown time   • cholecalciferol (VITAMIN D3) 25 MCG (1000 UT) tablet Take 2,000 Units by mouth Daily.   6/29/2021 at Unknown time   • dorzolamide  (TRUSOPT) 2 % ophthalmic solution Administer 1 drop into the left eye 2 (Two) Times a Day.   2021 at Unknown time   • famotidine (PEPCID) 20 MG tablet Take 1 tablet by mouth Daily.   2021 at Unknown time   • Ferric Citrate 1  MG(Fe) tablet Take 1 tablet by mouth 3 (Three) Times a Day With Meals.   2021 at Unknown time   • fluticasone (FLONASE) 50 MCG/ACT nasal spray 2 sprays into the nostril(s) as directed by provider 2 (Two) Times a Day.   2021 at Unknown time   • lactobacillus acidophilus (RISAQUAD) capsule capsule Take 1 capsule by mouth Daily. 30 capsule 2 2021 at Unknown time   • ondansetron (ZOFRAN) 4 MG tablet Take 1 tablet by mouth Every 6 (Six) Hours As Needed for Nausea or Vomiting. 24 tablet 2 2021 at Unknown time   • sertraline (ZOLOFT) 25 MG tablet Take 1 tablet by mouth Daily.   2021 at Unknown time   • sevelamer (RENAGEL) 800 MG tablet Take 800 mg by mouth 3 (Three) Times a Day With Meals.   2021 at Unknown time   • timolol (TIMOPTIC) 0.5 % ophthalmic solution Administer 1 drop into the left eye Every Morning.  12 2021 at Unknown time   • acetaminophen (TYLENOL) 325 MG tablet Take 650 mg by mouth Every 4 (Four) Hours As Needed for Mild Pain  or Fever.   Unknown at Unknown time   • ipratropium-albuterol (DUO-NEB) 0.5-2.5 mg/3 ml nebulizer Take 3 mL by nebulization Every 6 (Six) Hours As Needed for Wheezing or Shortness of Air.   Unknown at Unknown time   • [] vancomycin 50 MG/ML reconstituted solution oral solution reconstituted Take 2.5 mL by mouth Every 6 (Six) Hours for 18 doses. Indications: Clostridium Difficile Infection 45 mL 0        Medicines:  Current Facility-Administered Medications   Medication Dose Route Frequency Provider Last Rate Last Admin   • acetaminophen (TYLENOL) tablet 650 mg  650 mg Oral Q4H PRN Sarahy Mike APRN   650 mg at 21 0253    Or   • acetaminophen (TYLENOL) suppository 650 mg  650 mg Rectal Q4H PRN  Sarahy Mike APRN       • aspirin EC tablet 81 mg  81 mg Oral Daily Sarahy Mike APRN   81 mg at 07/02/21 0932   • atropine 1 % ophthalmic solution 1 drop  1 drop Left Eye Daily Sarahy Mike APRN   1 drop at 07/02/21 1000   • brimonidine (ALPHAGAN) 0.2 % ophthalmic solution 1 drop  1 drop Left Eye BID Sarahy Mike APRN   1 drop at 07/02/21 1001   • carvedilol (COREG) tablet 6.25 mg  6.25 mg Oral BID With Meals Sarahy Mike APRN   6.25 mg at 07/02/21 0931   • dextrose (D50W) 25 g/ 50mL Intravenous Solution 25 g  25 g Intravenous Q15 Min PRN Sarahy Mike APRN       • dextrose (GLUTOSE) oral gel 15 g  15 g Oral Q15 Min PRN Sarahy Mike APRN       • famotidine (PEPCID) tablet 20 mg  20 mg Oral Daily Sarahy Mike APRN   20 mg at 07/02/21 0931   • glucagon (human recombinant) (GLUCAGEN DIAGNOSTIC) injection 1 mg  1 mg Subcutaneous Q15 Min PRN Sarahy Mike APRN       • heparin (porcine) 5000 UNIT/ML injection 5,000 Units  5,000 Units Subcutaneous Q12H WoSarahy carmen APRN   5,000 Units at 07/02/21 0932   • insulin regular (humuLIN R,novoLIN R) injection 2-7 Units  2-7 Units Subcutaneous Q6H Sarahy Mike APRN   3 Units at 07/02/21 1220   • nicotine (NICODERM CQ) 21 MG/24HR patch 1 patch  1 patch Transdermal Q24H Sundeep Aldridge MD       • ondansetron (ZOFRAN) injection 4 mg  4 mg Intravenous Q6H PRN Sarahy Mike APRN       • pravastatin (PRAVACHOL) tablet 10 mg  10 mg Oral Nightly Sarahy Mike APRN   10 mg at 07/01/21 2205   • saccharomyces boulardii (FLORASTOR) capsule 500 mg  500 mg Oral BID Sarahy Mike, APRN   500 mg at 07/02/21 1215   • sertraline (ZOLOFT) tablet 25 mg  25 mg Oral Daily Sarahy Mike, APRN   25 mg at 07/02/21 0932   • sodium chloride 0.9 % flush 10 mL  10 mL Intravenous PRN Sarahy Mike, APRN       • sodium chloride 0.9 % flush 10 mL  10 mL Intravenous Q12H Sarahy Mike, APRN   10 mL  at 21 0932   • sodium chloride 0.9 % flush 10 mL  10 mL Intravenous PRN Sarahy Mike, APRN       • timolol (TIMOPTIC) 0.5 % ophthalmic solution 1 drop  1 drop Left Eye QAM Sarahy Mike APRN   1 drop at 21 1001   • vancomycin oral solution 125 mg  125 mg Oral Q6H Sarahy Mike, APRN   125 mg at 21 1215       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    • Chronic otitis media    • Diabetes (CMS/[facility])    • End stage renal disease on dialysis (CMS/[facility])    • Eustachian tube dysfunction    • GERD (gastroesophageal reflux disease)    • Glaucoma    • HTN (hypertension)    • Hyperlipidemia    • Mucoid otitis media    • Noncompliance of patient with renal dialysis (CMS/[facility])    • Tobacco abuse        Past Surgical History:  Past Surgical History:   Procedure Laterality Date   • ARTERIOVENOUS FISTULA     • CATARACT EXTRACTION WITH INTRAOCULAR LENS IMPLANT     •  SECTION     • CHOLECYSTECTOMY     • MYRINGOTOMY W/ TUBES Bilateral    • MYRINGOTOMY W/ TUBES Right    • TUBAL ABDOMINAL LIGATION         Family History  Family History   Problem Relation Age of Onset   • Heart disease Mother    • Diabetes Father    • Colon cancer Neg Hx    • Colon polyps Neg Hx        Social History  Social History     Socioeconomic History   • Marital status: Single     Spouse name: Not on file   • Number of children: Not on file   • Years of education: Not on file   • Highest education level: Not on file   Tobacco Use   • Smoking status: Current Every Day Smoker     Packs/day: 2.00     Years: 6.00     Pack years: 12.00     Types: Cigarettes   • Smokeless tobacco: Never Used   Vaping Use   • Vaping Use: Never used   Substance and Sexual Activity   • Alcohol use: No   • Drug use: No   • Sexual activity: Defer       Review of Systems:  History obtained from chart review and the patient  General ROS: No fever or chills  Respiratory ROS: No  "cough, shortness of breath, wheezing  Cardiovascular ROS: no chest pain or dyspnea on exertion  Gastrointestinal ROS: No abdominal pain or melena  Genito-Urinary ROS: No dysuria or hematuria  Musculoskeletal: negative  Skin: negative    Objective:  /68 (BP Location: Right arm, Patient Position: Sitting)   Pulse 72   Temp 95.9 °F (35.5 °C) (Oral)   Resp 18   Ht 147.3 cm (58\")   Wt 63.6 kg (140 lb 3.4 oz)   SpO2 97%   BMI 29.30 kg/m²     Intake/Output Summary (Last 24 hours) at 7/2/2021 1453  Last data filed at 7/2/2021 1100  Gross per 24 hour   Intake 120 ml   Output 1700 ml   Net -1580 ml       Physical examination:  General: awake/alert   Chest:  clear to auscultation bilaterally without respiratory distress  CVS: regular rate and rhythm  Abdominal: soft, nontender, normal bowel sounds  Extremities: no cyanosis or edema  Skin: warm and dry without rash  Neuro: No focal motor deficits    Labs:  Lab Results (last 24 hours)     Procedure Component Value Units Date/Time    POC Glucose Once [[id number]]  (Abnormal) Collected: 07/02/21 1212    Specimen: Blood Updated: 07/02/21 1223     Glucose 216 mg/dL      Comment: : [id number] Dominique Pyle ID: [id number]       Blood Culture - Blood, Wrist, Right [[id number]] Collected: 06/30/21 1030    Specimen: Blood from Wrist, Right Updated: 07/02/21 1100     Blood Culture No growth at 2 days    Blood Culture - Blood, Hand, Left [[id number]] Collected: 06/30/21 1032    Specimen: Blood from Hand, Left Updated: 07/02/21 1100     Blood Culture No growth at 2 days    POC Glucose Once [[id number]]  (Abnormal) Collected: 07/02/21 0532    Specimen: Blood Updated: 07/02/21 0544     Glucose 151 mg/dL      Comment: : [id number] Jorge Luis Alanis ID: [id number]       Basic Metabolic Panel [[id number]]  (Abnormal) Collected: 07/02/21 0440    Specimen: Blood Updated: 07/02/21 0520     Glucose 139 mg/dL      BUN 23 mg/dL      Creatinine 3.24 mg/dL      Sodium 136 mmol/L     "  Potassium 4.4 mmol/L      Chloride 96 mmol/L      CO2 30.0 mmol/L      Calcium 8.8 mg/dL      eGFR   Amer --     Comment: <15 Indicative of kidney failure.        eGFR Non African Amer 14 mL/min/1.73      Comment: <15 Indicative of kidney failure.        BUN/Creatinine Ratio 7.1     Anion Gap 10.0 mmol/L     Narrative:      GFR Normal >60  Chronic Kidney Disease <60  Kidney Failure <15      CBC (No Diff) [[id number]]  (Abnormal) Collected: 07/02/21 0440    Specimen: Blood Updated: 07/02/21 0450     WBC 6.40 10*3/mm3      RBC 3.32 10*6/mm3      Hemoglobin 10.0 g/dL      Hematocrit 33.7 %      .5 fL      MCH 30.1 pg      MCHC 29.7 g/dL      RDW 17.6 %      RDW-SD 65.7 fl      MPV 9.5 fL      Platelets 188 10*3/mm3     POC Glucose Once [[id number]]  (Abnormal) Collected: 07/01/21 2307    Specimen: Blood Updated: 07/01/21 2318     Glucose 198 mg/dL      Comment: : [id number] AppShareeter ID: [id number]             Radiology:   Imaging Results (Last 24 Hours)     Procedure Component Value Units Date/Time    MRI Brain Without Contrast [[id number]] Collected: 07/02/21 1222     Updated: 07/02/21 1245    Narrative:      EXAM: MRI BRAIN WO CONTRAST- - 7/2/2021 10:59 AM CDT     HISTORY: abnormal ct head, ams; R41.82-Altered mental status,  unspecified; A41.9-Sepsis, unspecified organism; U32-Tazyrbd effusion,  not elsewhere classified; R13.10-Dysphagia, unspecified; Z78.9-Other  specified health status; Z74.09-Other reduced mobility       COMPARISON: 6/30/2012.      TECHNIQUE:   Routine pulse sequences of the brain were obtained without IV contrast.      FINDINGS:   No acute intracranial hemorrhage, midline shift, mass effect, or  restricted diffusion to indicate acute infarct.      Ventricles, sulci and basilar cisterns have proportional prominence  suggesting volume loss. T2/FLAIR hyperintensity in the periventricular  and subcutaneous cortical white matter, most characteristic of  chronic  microvascular changes. Old left basal ganglia lacunar infarct. Sella and  midline structures within normal limits. Brainstem and posterior fossa  are within normal limits.     Visualized flow voids within normal limits. Postoperative changes at the  left globe. No mastoid effusion or paranasal sinus fluid.      There is a 4 x 2 cm oval area of fluid intensity at the level of the  left occipital condyle and C1. No associated diffusion restriction.  Limited in evaluation on prior comparison CT due to field-of-view and CT  technique.          Impression:      1. No evidence of acute infarct. Specifically, no abnormality in the  right medial cerebellum to correlate with prior CT 6/30/2021.  2. There is a 4 x 2 cm area of fluid intensity at the level of the left  occipital condyle and C1 of indeterminate etiology.   3. Mild chronic microvascular changes and moderate volume loss.     This report was finalized on 07/02/2021 12:41 by Dr Carline Mcgovern MD.              Assessment   -ESRD maintenance HD on TTS ([facility])  -C.Diff diarrhea  -AMS  -Fever  -Right pleural effusion  -DM2  -HTN  -Anemia of CKD    Plan:  Dialysis is due again on 7/3.  Follow-up labs.      Ten Boyd MD  7/2/2021  14:53 CDT

## 2021-07-02 NOTE — PLAN OF CARE
Goal Outcome Evaluation:  Plan of Care Reviewed With: patient        Progress: no change  Outcome Summary: PT evaluation completed. The patient presents alert and oriented x4 sitting EOB. The patient broke her R humerus a few months ago, but has been using the arm to use a walker with therapy at the SNF. She has limited ROM of the R shoulder, but otherwise she is generally strong and able to walk a short distance. She will benefit from skilled PT services to work on activity tolerance. Recommend discharge back to SNF.

## 2021-07-02 NOTE — PROGRESS NOTES
[facility]  INPATIENT PROGRESS NOTE    Length of Stay: 2  Date of Admission: 6/30/2021  Primary Care Physician: Bharati Dahl APRN    Subjective   Chief Complaint: Follow-up  HPI   Patient is waiting to go to MRI.  She states she feels pretty good today.  She denies any chest pain or shortness of breath.  She denies any abdominal pain, nausea, or vomiting.  She states her appetite is good.  She has had an external fecal management system, nursing reports she does feel the frequency of diarrhea has slowed some.    Review of Systems   All pertinent negatives and positives are as above. All other systems have been reviewed and are negative unless otherwise stated.     Objective    Temp:  [95.9 °F (35.5 °C)-99.1 °F (37.3 °C)] 95.9 °F (35.5 °C)  Heart Rate:  [61-82] 72  Resp:  [16-20] 18  BP: (110-148)/(56-83) 148/68  Physical Exam  Vitals and nursing note reviewed.   Constitutional:       Appearance: She is ill-appearing (chronically ill appearing).   HENT:      Head: Normocephalic and atraumatic.   Cardiovascular:      Rate and Rhythm: Normal rate and regular rhythm.      Pulses: Normal pulses.      Heart sounds: Normal heart sounds.      Comments: Left upper extremity AV fistula  Sinus 66-71  Pulmonary:      Effort: Pulmonary effort is normal.      Breath sounds: No wheezing, rhonchi or rales.      Comments: Diminished in bilateral bases  Abdominal:      General: Bowel sounds are normal. There is no distension.      Palpations: Abdomen is soft.      Tenderness: There is no abdominal tenderness.   Musculoskeletal:         General: No swelling or tenderness.      Cervical back: Normal range of motion and neck supple. No tenderness.   Skin:     General: Skin is warm and dry.      Findings: No erythema or rash.   Neurological:      General: No focal deficit present.      Mental Status: She is alert and oriented to person, place, and time.   Psychiatric:         Mood  and Affect: Mood normal.         Behavior: Behavior normal.         Thought Content: Thought content normal.         Judgment: Judgment normal.   Results Review:  I have reviewed the labs, radiology results, and diagnostic studies.    Laboratory Data:   Results from last 7 days   Lab Units 07/02/21 0440 07/01/21 0242 06/30/21  1030   WBC 10*3/mm3 6.40 10.66 12.66*   HEMOGLOBIN g/dL 10.0* 10.1* 10.8*   HEMATOCRIT % 33.7* 33.7* 35.3   PLATELETS 10*3/mm3 188 199 255        Results from last 7 days   Lab Units 07/02/21  0440 07/01/21  0242 06/30/21  1124   SODIUM mmol/L 136 135* 137   POTASSIUM mmol/L 4.4 4.8 4.2   CHLORIDE mmol/L 96* 94* 94*   CO2 mmol/L 30.0* 28.0 29.0   BUN mg/dL 23 31* 26*   CREATININE mg/dL 3.24* 4.33* 3.75*   CALCIUM mg/dL 8.8 8.7 9.0   BILIRUBIN mg/dL  --   --  0.9   ALK PHOS U/L  --   --  314*   ALT (SGPT) U/L  --   --  6   AST (SGOT) U/L  --   --  15   GLUCOSE mg/dL 139* 82 210*     I have reviewed the patient current medications.     Assessment/Plan     Active Hospital Problems    Diagnosis    • **Altered mental status    • Fever    • Elevated troponin    • Fx humeral neck, right, with nonunion, subsequent encounter    • Pleural effusion on right    • C. difficile diarrhea    • Chronic anemia    • DM2 (diabetes mellitus, type 2) (CMS/[facility])    • HTN (hypertension)      Plan:  1.  The patient presented to the emergency department from [facility] found with altered mental status and fever of 102 °F.  [facility] nursing [facility] also reports diarrhea.  2.  The patient had a recent admission to our facility from 6/16 through 6/24, presenting with altered mental status as well.  She was found to have C. difficile associated diarrhea as well as Pseudomonas bacteremia.  She was evaluated by infectious disease during that hospitalization and it was suspected her bacteremia was transient and did not need an extended course of treatment.  She was treated with a 5-day course of  meropenem.  Her treatment for C. difficile with oral vancomycin should have finished on June 28.  3.  C. difficile toxin is once again positive with negative GI panel.  Blood cultures with no growth thus far.  Discussed with infectious disease.  She has been cleared for oral intake, will discontinue Flagyl and continue oral vancomycin.  4.  We will change from Risaquad to maximum dosage of Florastor.  Monitor for improvement of diarrhea.  5.  Nephrology following, patient to maintain regular dialysis schedule.  6.  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  This has been known since at least March.  Dr. Jeff Springer will continue to follow as an outpatient, she has an appointment on 7/8.  7.  CT of the head in the emergency department showed new hypodensity of the right medial cerebellum, which could represent anterior abnormality or artifact.  MRI recommended, this has been ordered.  No focal deficits noted on exam.  8.  Troponin demonstrates a flat trend x4 values.  This is chronically elevated in the setting of end-stage renal disease.  No acute ST-T wave changes noted on EKG.  No complaints of chest pain.  No plans for any further cardiac work-up at this time unless she develops symptoms.  9.  Continue PT/OT.  Planning for discharge back to skilled nursing facility.  Pre-CERT has been started.  10.  Labs in AM    Discharge Planning: I expect the patient to be discharged to SNF in 1-2 days.    Electronically signed by ANTHONY George, 7/2/2021, 11:07 CDT.

## 2021-07-03 NOTE — THERAPY TREATMENT NOTE
Acute Care - Physical Therapy Treatment Note  [facility]     Patient Name: Mini Gentile  : 1960  MRN: [id number]  Today's Date: 7/3/2021           PT Assessment (last 12 hours)      PT Evaluation and Treatment     Row Name 21 1433 21 0749       Physical Therapy Time and Intention    Subjective Information  no complaints  -NAOMI  --    Document Type  therapy note (daily note)  -NAOMI  therapy note (daily note)  -NAOMI    Mode of Treatment  physical therapy  -NAOMI  physical therapy  -NAOMI    Session Not Performed  --  patient unavailable for treatment  -NAOMI    Comment, Session Not Performed  --  dialysis  -NAOMI    Row Name 21 1433          General Information    Existing Precautions/Restrictions  fall c-diff   -NAOMI     Row Name 21 1433          Pain Scale: Word Pre/Post-Treatment    Pain: Word Scale, Pretreatment  0 - no pain  -NAOMI     Posttreatment Pain Rating  2 - mild pain  -NAOMI     Pain Location - Side  Right  -NAOMI     Pain Location - Orientation  upper  -NAOMI     Pain Location  extremity  -NAOMI     Pain Intervention(s)  Repositioned  -NAOMI     Row Name 21 1433          Bed Mobility    Comment (Bed Mobility)  sitting EOB   -NAOMI     Row Name 21 1433          Transfers    Sit-Stand [place] (Transfers)  verbal cues;contact guard  -NAOMI     Row Name 21 1433          Gait/Stairs (Locomotion)    [place] Level (Gait)  verbal cues;contact guard  -NAOMI     Assistive Device (Gait)  walker, front-wheeled  -NAOMI     Distance in Feet (Gait)  120  -NAOMI     Row Name             Wound 21 0142 coccyx    Wound - Properties Group Placement Date: 21  -VT Placement Time: 142 Location: coccyx  -VT    Retired Wound - Properties Group Date first assessed: 21  -VT Time first assessed: 142  -VT Location: coccyx  -VT    Row Name 21 1433          Positioning and Restraints    Pre-Treatment Position  in bed  -NAOMI     Post Treatment Position  bed  -NAOMI     In Bed  sitting EOB;call  light within reach;encouraged to call for assist;with family/caregiver;side rails up x2  -NAOMI       User Key  (r) = Recorded By, (t) = Taken By, (c) = Cosigned By    Initials Name Provider Type    Domingo Whitaker PTA Physical Therapy Assistant    Alyx Kumar RN Registered Nurse        Physical Therapy Education                 Title: PT OT SLP Therapies (In Progress)     Topic: Physical Therapy (In Progress)     Point: Mobility training (Done)     Learning Progress Summary           Patient Acceptance, E, VU by MS at 7/2/2021 0841    Comment: role of PT in her care                   Point: Home exercise program (Not Started)     Learner Progress:  Not documented in this visit.          Point: Body mechanics (Not Started)     Learner Progress:  Not documented in this visit.          Point: Precautions (Not Started)     Learner Progress:  Not documented in this visit.                      User Key     Initials Effective Dates Name Provider Type Discipline    MS 06/19/18 -  Maia Ovalle, PT, DPT, NCS Physical Therapist PT              PT Recommendation and Plan     Plan of Care Reviewed With: patient  Progress: improving  Outcome Summary: Pt was sitting EOB, transfered sit to stand with CGA.  Amb 150' with RWX and CGA.  Will continue to work with pt to increase strength and progress mobility for pt to be independent.       Time Calculation:   PT Charges     Row Name 07/03/21 1433             Time Calculation    Start Time  1433  -NAOMI      Stop Time  1450  -NAOMI      Time Calculation (min)  17 min  -NAOMI      PT Received On  07/03/21  -NAOMI         Time Calculation- PT    Total Timed Code Minutes- PT  17 minute(s)  -NAOMI         Timed Charges    50426 - Gait Training Minutes   17  -NAOMI         Total Minutes    Timed Charges Total Minutes  17  -NAOMI       Total Minutes  17  -NAOMI        User Key  (r) = Recorded By, (t) = Taken By, (c) = Cosigned By    Initials Name Provider Type    Domingo Whitaker PTA  Physical Therapy Assistant        Therapy Charges for Today     Code Description Service Date Service Provider Modifiers Qty    89180055003 HC GAIT TRAINING EA 15 MIN 7/3/2021 Domingo Dill, PTA GP 1          PT G-Codes  Outcome Measure Options: AM-PAC 6 Clicks Basic Mobility (PT)  AM-PAC 6 Clicks Score (PT): 14  AM-PAC 6 Clicks Score (OT): 17    Domingo Dill PTA  7/3/2021

## 2021-07-03 NOTE — CONSULTS
Consult  [facility] [place] [facility]      Mini Gentile (1960)  7/3/2021    Reason for Consult: Fracture right proximal humerus  Requesting Physician: No ref. provider found      CHIEF COMPLAINT: Fracture right proximal humerus    History Obtained From: Patient this patient is a poor historian.  She has a fracture of her right proximal humerus but does not remember any fall or accident or any time in which it might have happened    HISTORY OF PRESENT ILLNESS:                The patient is a 61 y.o. female who presents with above chief complaint.  The orthopedic service was consulted for evaluation of her right shoulder.  This patient apparently has had sepsis.  She has had an altered mental status the.  At some point she has had a fracture of the right proximal humerus.  X-rays reveal a fracture in the area of the surgical neck of the humerus with a delayed or nonunion and what appears to be avascular necrosis of the humeral head with some dissolution of the humeral head.  This fracture appears to be dated    Orthopedics was consulted on this patient.    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    • Chronic otitis media    • Diabetes (CMS/[facility])    • End stage renal disease on dialysis (CMS/[facility])    • Eustachian tube dysfunction    • GERD (gastroesophageal reflux disease)    • Glaucoma    • HTN (hypertension)    • Hyperlipidemia    • Mucoid otitis media    • Noncompliance of patient with renal dialysis (CMS/[facility])    • Tobacco abuse        Past Surgical History:    Past Surgical History:   Procedure Laterality Date   • ARTERIOVENOUS FISTULA     • CATARACT EXTRACTION WITH INTRAOCULAR LENS IMPLANT     •  SECTION     • CHOLECYSTECTOMY     • MYRINGOTOMY W/ TUBES Bilateral    • MYRINGOTOMY W/ TUBES Right    • TUBAL ABDOMINAL LIGATION         Current Medications:     Current Facility-Administered Medications:   •   acetaminophen (TYLENOL) tablet 650 mg, 650 mg, Oral, Q4H PRN, 650 mg at 07/03/21 0951 **OR** acetaminophen (TYLENOL) suppository 650 mg, 650 mg, Rectal, Q4H PRN, Woeltz, Sarahy K, APRN  •  aspirin EC tablet 81 mg, 81 mg, Oral, Daily, Woeltz, Sarahy K, APRN, 81 mg at 07/03/21 0952  •  atropine 1 % ophthalmic solution 1 drop, 1 drop, Left Eye, Daily, Woeltz, Sarahy K, APRN, 1 drop at 07/03/21 0953  •  brimonidine (ALPHAGAN) 0.2 % ophthalmic solution 1 drop, 1 drop, Left Eye, BID, WoeltzSamanthaSarahy K, APRN, 1 drop at 07/03/21 0953  •  carvedilol (COREG) tablet 6.25 mg, 6.25 mg, Oral, BID With Meals, Woeltulises, Sarahy K, APRN, 6.25 mg at 07/02/21 1835  •  dextrose (D50W) 25 g/ 50mL Intravenous Solution 25 g, 25 g, Intravenous, Q15 Min PRN, Woeltz, Sarahy K, APRN  •  dextrose (GLUTOSE) oral gel 15 g, 15 g, Oral, Q15 Min PRN, Woeltz, Sarahy K, APRN  •  famotidine (PEPCID) tablet 20 mg, 20 mg, Oral, Daily, Woeltz, Sarahy K, APRN, 20 mg at 07/03/21 0952  •  glucagon (human recombinant) (GLUCAGEN DIAGNOSTIC) injection 1 mg, 1 mg, Subcutaneous, Q15 Min PRN, Woeltz, Sarahy K, APRN  •  heparin (porcine) 5000 UNIT/ML injection 5,000 Units, 5,000 Units, Subcutaneous, Q12H, WoeltSamantha montgomerySarahy K, APRN, 5,000 Units at 07/03/21 0952  •  insulin regular (humuLIN R,novoLIN R) injection 2-7 Units, 2-7 Units, Subcutaneous, Q6H, Woeltz, Sarahy K, APRN, 3 Units at 07/02/21 1842  •  nicotine (NICODERM CQ) 21 MG/24HR patch 1 patch, 1 patch, Transdermal, Q24H, Sundeep Aldridge MD, 1 patch at 07/02/21 2018  •  ondansetron (ZOFRAN) injection 4 mg, 4 mg, Intravenous, Q6H PRN, Woeltz, Sarahy K, APRN  •  pravastatin (PRAVACHOL) tablet 10 mg, 10 mg, Oral, Nightly, Woeltz, Sarahy K, APRN, 10 mg at 07/02/21 2017  •  saccharomyces boulardii (FLORASTOR) capsule 500 mg, 500 mg, Oral, BID, Woeltz, Sarahy K, APRN, 500 mg at 07/03/21 0952  •  sertraline (ZOLOFT) tablet 25 mg, 25 mg, Oral, Daily, Woeltz, Sarahy K, APRN, 25 mg at  07/03/21 0954  •  [COMPLETED] Insert peripheral IV, , , Once **AND** sodium chloride 0.9 % flush 10 mL, 10 mL, Intravenous, PRN, Woeltz, Sarahy K, APRN  •  sodium chloride 0.9 % flush 10 mL, 10 mL, Intravenous, Q12H, Woeltz, Sarahy K, APRN, 10 mL at 07/03/21 0954  •  sodium chloride 0.9 % flush 10 mL, 10 mL, Intravenous, PRN, Woeltz, Sarahy K, APRN  •  timolol (TIMOPTIC) 0.5 % ophthalmic solution 1 drop, 1 drop, Left Eye, QAM, Woeltz, Sarahy K, APRN, 1 drop at 07/03/21 0953  •  vancomycin oral solution 125 mg, 125 mg, Oral, Q6H, Woeltz, Sarahy K, APRN, Stopped at 07/03/21 0506    Allergies:  Bupropion, Chantix [varenicline], Gabapentin, Azithromycin, Levofloxacin, Penicillins, and Sulfa antibiotics    Social History:   Social History     Socioeconomic History   • Marital status: Single     Spouse name: Not on file   • Number of children: Not on file   • Years of education: Not on file   • Highest education level: Not on file   Tobacco Use   • Smoking status: Current Every Day Smoker     Packs/day: 2.00     Years: 6.00     Pack years: 12.00     Types: Cigarettes   • Smokeless tobacco: Never Used   Vaping Use   • Vaping Use: Never used   Substance and Sexual Activity   • Alcohol use: No   • Drug use: No   • Sexual activity: Defer       Family History:   Family History   Problem Relation Age of Onset   • Heart disease Mother    • Diabetes Father    • Colon cancer Neg Hx    • Colon polyps Neg Hx        REVIEW OF SYSTEMS:    Musculoskeletal: Known fracture of the right proximal humerus with minimal discomfort at this time patient is unaware of when this happened    PHYSICAL EXAM:        General Appearance:    Alert, cooperative, in no acute distress she is a poor historian.  She cannot relate to me why she is in the hospital or at what point she had an injury to her right shoulder   Head:    Normocephalic, without obvious abnormality, atraumatic   Eyes:            Vision intact, EOM intact     Ears:    Ears  appear intact with no abnormalities noted   Neck:   No adenopathy, supple, trachea midline, no thyromegaly   Back:     No kyphosis present, no scoliosis present, no skin lesions,      erythema or scars, no tenderness to palpation,   range of motion normal   Lungs:     Clear, respirations regular, even and unlabored    Heart:    Regular rhythm and normal rate, no edema   Chest Wall:    No abnormalities observed   Abdomen:     Normal bowel sounds, no masses, no organomegaly, soft        non-tender, non-distended, no guarding   Rectal:     Deferred   Extremities:  Right shoulder is soft with no clinical deformity.  She has minimal discomfort on palpation about the shoulder there is no erythema or increased warmth   Pulses:   Pulses palpable and equal bilaterally   Skin:   No bleeding, bruising or rash   Lymph nodes:   No palpable adenopathy   Neurologic:   Cranial nerves 2 - 12 grossly intact, alert and oriented times 3.         DATA:    Lab Results (last 24 hours)     Procedure Component Value Units Date/Time    Basic Metabolic Panel [[id number]]  (Abnormal) Collected: 07/03/21 0459    Specimen: Blood Updated: 07/03/21 0540     Glucose 122 mg/dL      BUN 30 mg/dL      Creatinine 3.93 mg/dL      Sodium 132 mmol/L      Potassium 4.8 mmol/L      Chloride 91 mmol/L      CO2 28.0 mmol/L      Calcium 9.1 mg/dL      eGFR   Amer --     Comment: <15 Indicative of kidney failure.        eGFR Non African Amer 12 mL/min/1.73      Comment: <15 Indicative of kidney failure.        BUN/Creatinine Ratio 7.6     Anion Gap 13.0 mmol/L     Narrative:      GFR Normal >60  Chronic Kidney Disease <60  Kidney Failure <15      POC Glucose Once [[id number]]  (Normal) Collected: 07/03/21 0521    Specimen: Blood Updated: 07/03/21 0532     Glucose 126 mg/dL      Comment: : [id number] [email or web address] ID: [id number]       CBC (No Diff) [[id number]]  (Abnormal) Collected: 07/03/21 0459    Specimen: Blood Updated: 07/03/21 0521     WBC  4.80 10*3/mm3      RBC 3.65 10*6/mm3      Hemoglobin 10.8 g/dL      Hematocrit 36.0 %      MCV 98.6 fL      MCH 29.6 pg      MCHC 30.0 g/dL      RDW 17.2 %      RDW-SD 62.3 fl      MPV 9.4 fL      Platelets 198 10*3/mm3     POC Glucose Once [[id number]]  (Normal) Collected: 07/03/21 0021    Specimen: Blood Updated: 07/03/21 0032     Glucose 129 mg/dL      Comment: : [id number] Jorge Luis TorreshMeter ID: [id number]       POC Glucose Once [[id number]]  (Abnormal) Collected: 07/02/21 1834    Specimen: Blood Updated: 07/02/21 1846     Glucose 237 mg/dL      Comment: : [id number] Dominique SoaresMeter ID: [id number]             Radiology:   Imaging Results (Last 7 Days)     Procedure Component Value Units Date/Time    MRI Brain Without Contrast [[id number]] Collected: 07/02/21 1222     Updated: 07/02/21 1245    Narrative:      EXAM: MRI BRAIN WO CONTRAST- - 7/2/2021 10:59 AM CDT     HISTORY: abnormal ct head, ams; R41.82-Altered mental status,  unspecified; A41.9-Sepsis, unspecified organism; Z58-Cunxiiv effusion,  not elsewhere classified; R13.10-Dysphagia, unspecified; Z78.9-Other  specified health status; Z74.09-Other reduced mobility       COMPARISON: 6/30/2012.      TECHNIQUE:   Routine pulse sequences of the brain were obtained without IV contrast.      FINDINGS:   No acute intracranial hemorrhage, midline shift, mass effect, or  restricted diffusion to indicate acute infarct.      Ventricles, sulci and basilar cisterns have proportional prominence  suggesting volume loss. T2/FLAIR hyperintensity in the periventricular  and subcutaneous cortical white matter, most characteristic of chronic  microvascular changes. Old left basal ganglia lacunar infarct. Sella and  midline structures within normal limits. Brainstem and posterior fossa  are within normal limits.     Visualized flow voids within normal limits. Postoperative changes at the  left globe. No mastoid effusion or paranasal sinus fluid.      There is a 4 x 2 cm  oval area of fluid intensity at the level of the  left occipital condyle and C1. No associated diffusion restriction.  Limited in evaluation on prior comparison CT due to field-of-view and CT  technique.          Impression:      1. No evidence of acute infarct. Specifically, no abnormality in the  right medial cerebellum to correlate with prior CT 6/30/2021.  2. There is a 4 x 2 cm area of fluid intensity at the level of the left  occipital condyle and C1 of indeterminate etiology.   3. Mild chronic microvascular changes and moderate volume loss.     This report was finalized on 07/02/2021 12:41 by Dr Carline Mcgovern MD.    CT Head Without Contrast [[id number]] Collected: 06/30/21 1158     Updated: 06/30/21 1203    Narrative:      EXAM: CT HEAD WO CONTRAST- - 6/30/2021 11:52 AM CDT     HISTORY: Altered mental status       COMPARISON: 6/16/2021.      DOSE LENGTH PRODUCT: 1516 mGy cm. Automated exposure control was also  utilized to decrease patient radiation dose.     TECHNIQUE: Unenhanced CT images obtained from vertex to skull base with  multiplanar reformats.     FINDINGS:  New hypodensity at the right medial cerebellum.     Ventricles, basilar cisterns and sulci have proportional prominence  suggesting volume loss. Periventricular and subcortical white matter  hypodensities, most likely due to chronic microvascular disease.     Postoperative changes to the left eye. Calvarium appears intact.  Subcutaneous tissues within normal limits.          Impression:      1. New hypodensity at the right medial cerebellum, could represent a  true abnormality or artifact. Recommend MRI for further evaluation.   2. Similar chronic microvascular changes and volume loss.  This report was finalized on 06/30/2021 12:00 by Dr Carline Mcgovern MD.    XR Shoulder 2+ View Right [[id number]] Collected: 06/30/21 1021     Updated: 06/30/21 1026    Narrative:      Right shoulder, 3 views 6/30/2021 10:03 AM CDT     History: Right shoulder  tenderness     Comparison: March 26, 2021      Findings:   Internal rotation, and external rotation views of the right shoulder are  submitted.      There is nonunion of the previously described comminuted fracture the  head and neck of the right humerus. The skeletal structures are not  aligned. There is been partial resorption of the humeral head. The AC  joint is maintained.. Moderate right pleural effusion is present..        Impression:      Impression:   1. 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        This report was finalized on 06/30/2021 10:22 by Dr. Tony Malik MD.    XR Chest 1 View [[id number]] Collected: 06/30/21 1018     Updated: 06/30/21 1022    Narrative:      EXAM: XR CHEST 1 VW- 6/30/2021 10:03 AM CDT     HISTORY: ams       COMPARISON: June 16, 2021.     TECHNIQUE: Frontal radiograph of the chest     FINDINGS:   Large right lateral pleural effusion is present. This is similar to June 16, 2021. Left lung is clear.. Cardiac silhouettes moderately enlarged..        The osseous structures and surrounding soft tissues demonstrate no acute  abnormality.          Impression:      1. Large right lateral pleural effusion unchanged from June 16, 2021.        This report was finalized on 06/30/2021 10:19 by Dr. Tony Malik MD.          Imaging was brought up and reviewed and I agree with the radiology findings.    IMPRESSION/RECOMMENDATIONS:      Altered mental status    HTN (hypertension)    DM2 (diabetes mellitus, type 2) (CMS/[facility])    Chronic anemia    C. difficile diarrhea    Fever    Elevated troponin    Fx humeral neck, right, with nonunion, subsequent encounter    Pleural effusion on right      Assessment: Fracture of the right proximal humerus sometime ago with what appears to be a nonunion and avascular process of the humeral head    Plan:  1) I feel that observation is in order  2) repeat x-rays of the right shoulder in  approximately 4 weeks      Electronically signed by Jeff Springer MD10:44 CDT7/3/2021

## 2021-07-03 NOTE — PROGRESS NOTES
"Infectious Diseases Progress Note    Patient:  iMni Gentile  YOB: 1960  MRN: [id number]   Admit date: 6/30/2021   Admitting Physician: Bin Guy DO  Primary Care Physician: Bharati Dahl APRN    Chief Complaint/Interval History: She feels okay.  She had good oral intake last night for dinner and again with breakfast today and also with lunch.  She does not report watery bowel movements.  She indicates stools almost formed.  Slightly soft in consistency.  No abdominal pain or nausea.    Intake/Output Summary (Last 24 hours) at 7/3/2021 1432  Last data filed at 7/3/2021 0915  Gross per 24 hour   Intake --   Output 2150 ml   Net -2150 ml     Allergies:   Allergies   Allergen Reactions   • Bupropion Nausea Only   • Chantix [Varenicline] Nausea And Vomiting     Does not remember   • Gabapentin Hallucinations     hallucinations   • Azithromycin Rash   • Levofloxacin Rash   • Penicillins Rash   • Sulfa Antibiotics Rash     Current Scheduled Medications:   aspirin, 81 mg, Oral, Daily  atropine, 1 drop, Left Eye, Daily  brimonidine, 1 drop, Left Eye, BID  carvedilol, 6.25 mg, Oral, BID With Meals  famotidine, 20 mg, Oral, Daily  heparin (porcine), 5,000 Units, Subcutaneous, Q12H  insulin regular, 2-7 Units, Subcutaneous, Q6H  nicotine, 1 patch, Transdermal, Q24H  pravastatin, 10 mg, Oral, Nightly  saccharomyces boulardii, 500 mg, Oral, BID  sertraline, 25 mg, Oral, Daily  sodium chloride, 10 mL, Intravenous, Q12H  timolol, 1 drop, Left Eye, QAM  vancomycin, 125 mg, Oral, Q6H      Current PRN Medications:  •  acetaminophen **OR** acetaminophen  •  dextrose  •  dextrose  •  glucagon (human recombinant)  •  ondansetron  •  [COMPLETED] Insert peripheral IV **AND** sodium chloride  •  sodium chloride    Review of Systems see HPI    Vital Signs:  /46 (BP Location: Right leg, Patient Position: Lying)   Pulse 76   Temp 97.6 °F (36.4 °C) (Oral)   Resp 18   Ht 147.3 cm (58\")   Wt 63.9 kg (140 " lb 14 oz)   SpO2 98%   BMI 29.44 kg/m²     Physical Exam  Vital signs - reviewed.  Abdomen soft and nontender.  No guarding or rebound.    Lab Results:  CBC:   Results from last 7 days   Lab Units 07/03/21  0459 07/02/21 0440 07/01/21 0242 06/30/21  1030   WBC 10*3/mm3 4.80 6.40 10.66 12.66*   HEMOGLOBIN g/dL 10.8* 10.0* 10.1* 10.8*   HEMATOCRIT % 36.0 33.7* 33.7* 35.3   PLATELETS 10*3/mm3 198 188 199 255     BMP:  Results from last 7 days   Lab Units 07/03/21  0459 07/02/21 0440 07/01/21 0242 06/30/21  1124   SODIUM mmol/L 132* 136 135* 137   POTASSIUM mmol/L 4.8 4.4 4.8 4.2   CHLORIDE mmol/L 91* 96* 94* 94*   CO2 mmol/L 28.0 30.0* 28.0 29.0   BUN mg/dL 30* 23 31* 26*   CREATININE mg/dL 3.93* 3.24* 4.33* 3.75*   GLUCOSE mg/dL 122* 139* 82 210*   CALCIUM mg/dL 9.1 8.8 8.7 9.0   ALT (SGPT) U/L  --   --   --  6     Culture Results:   Blood Culture   Date Value Ref Range Status   06/30/2021 No growth at 3 days  Preliminary   06/30/2021 No growth at 3 days  Preliminary     Radiology: None  Additional Studies Reviewed: None    Impression:   C. difficile colitis.  Improving.  Tolerating oral intake.    Recommendations:   Okay with me for discharge when ready for release per primary service  Suggest vancomycin 125 mg orally 4 times daily-would recommend a 14-day course from initiation  Would be happy to reassess if additional questions for infectious diseases  Otherwise will sign off for now  Follow-up with infectious diseases as needed    Fabio Gomez MD

## 2021-07-03 NOTE — PROGRESS NOTES
[facility] Medicine Services  INPATIENT PROGRESS NOTE    Patient Name: Mini Gentile  Date of Admission: 6/30/2021  Today's Date: 07/03/21  Length of Stay: 3  Primary Care Physician: hBarati Dahl APRN    Subjective   Chief Complaint: Follow-up C. Difficile    HPI   Patient feels like she is doing better today.  She feels like her bowel movements are starting to slow down.  No nausea or vomiting.  Tolerating p.o.  Afebrile.        Review of Systems   All pertinent negatives and positives are as above. All other systems have been reviewed and are negative unless otherwise stated.     Objective    Temp:  [97.6 °F (36.4 °C)-97.7 °F (36.5 °C)] 97.6 °F (36.4 °C)  Heart Rate:  [68-76] 76  Resp:  [18] 18  BP: (121-157)/(46-74) 121/46  Physical Exam  GEN: Awake, alert, interactive, in NAD  HEENT: Anicteric, Trachea midline  Lungs: CTAB, no wheezing/rales/rhonchi  Heart: RRR, +S1/s2, no rub  ABD: soft, nt/nd, +BS, no guarding/rebound  Extremities: atraumatic, no cyanosis  Skin: no rashes or lesions  Neuro: AAOx3, no focal deficits  Psych: normal mood & affect        Results Review:  I have reviewed the labs, radiology results, and diagnostic studies.    Laboratory Data:   Results from last 7 days   Lab Units 07/03/21 0459 07/02/21 0440 07/01/21  0242   WBC 10*3/mm3 4.80 6.40 10.66   HEMOGLOBIN g/dL 10.8* 10.0* 10.1*   HEMATOCRIT % 36.0 33.7* 33.7*   PLATELETS 10*3/mm3 198 188 199        Results from last 7 days   Lab Units 07/03/21 0459 07/02/21 0440 07/01/21  0242 06/30/21  1124   SODIUM mmol/L 132* 136 135* 137   POTASSIUM mmol/L 4.8 4.4 4.8 4.2   CHLORIDE mmol/L 91* 96* 94* 94*   CO2 mmol/L 28.0 30.0* 28.0 29.0   BUN mg/dL 30* 23 31* 26*   CREATININE mg/dL 3.93* 3.24* 4.33* 3.75*   CALCIUM mg/dL 9.1 8.8 8.7 9.0   BILIRUBIN mg/dL  --   --   --  0.9   ALK PHOS U/L  --   --   --  314*   ALT (SGPT) U/L  --   --   --  6   AST (SGOT) U/L  --   --   --  15   GLUCOSE mg/dL 122* 139*  82 210*       Culture Data:   Blood Culture   Date Value Ref Range Status   06/30/2021 No growth at 2 days  Preliminary   06/30/2021 No growth at 2 days  Preliminary       Radiology Data:   Imaging Results (Last 24 Hours)     ** No results found for the last 24 hours. **          I have reviewed the patient's current medications.     Assessment/Plan     Active Hospital Problems    Diagnosis    • **Altered mental status    • Fever    • Elevated troponin    • Fx humeral neck, right, with nonunion, subsequent encounter    • Pleural effusion on right    • C. difficile diarrhea    • Chronic anemia    • DM2 (diabetes mellitus, type 2) (CMS/[facility])    • HTN (hypertension)        Patient is improving.  Mental status has improved.  Afebrile.  Diarrhea improving.    Continue p.o. vancomycin and Florastor.  Patient ID input.    Ortho evaluating for shoulder but recommends ongoing monitoring.    H&H is stable    Continue meds for blood pressure and diabetes    Dialysis as per schedule/nephrology recommendations    Plan for return back to skilled nursing facility when insurance authorization approved.      Discharge Planning: I expect the patient to be discharged to skilled nursing facility when pre-CERT back.  Currently pending.    Electronically signed by Bin Guy DO, 07/03/21, 10:44 CDT.

## 2021-07-03 NOTE — PLAN OF CARE
Goal Outcome Evaluation:  Plan of Care Reviewed With: patient        Progress: improving  Outcome Summary: A&Ox4. Forgetful at times. C/o pain. Prn pain medication given with good relief. Denies n/t. PPP. MONSALVE. Dialysis today. Tele- NS 82. . Contact precautions maintained. Multiple BMs this shift. Blood sugar monitored. Call light within reach. Safety maintained.

## 2021-07-03 NOTE — PLAN OF CARE
Problem: Adult Inpatient Plan of Care  Goal: Plan of Care Review  Outcome: Ongoing, Progressing  Flowsheets (Taken 7/3/2021 0242)  Progress: no change  Plan of Care Reviewed With: patient  Outcome Summary: Pt A&Ox3, disoriented to time. Confused at times. No c/o pain to n/t. MONSALVE, up x 1 to BSC. External fecal management device removed. BMs less frequent. C-diff precautions maintained. NSR on tele. Bed alarm set, call light in reach, safety maintained.

## 2021-07-03 NOTE — PLAN OF CARE
Goal Outcome Evaluation:  Plan of Care Reviewed With: patient        Progress: improving  Outcome Summary: Pt was sitting EOB, transfered sit to stand with CGA.  Amb 150' with RWX and CGA.  Will continue to work with pt to increase strength and progress mobility for pt to be independent.

## 2021-07-03 NOTE — PROGRESS NOTES
PROGRESS NOTE.      Patient:  Mini Gentile  YOB: 1960  Date of Service: 7/3/2021  MRN: [id number]   Acct: [id number]   Primary Care Physician: Bharati Dahl APRN  Advance Directive:   Code Status and Medical Interventions:   Ordered at: 06/30/21 1620     Level Of Support Discussed With:    Patient     Code Status:    CPR     Medical Interventions (Level of Support Prior to Arrest):    Full     Admit Date: 6/30/2021       Hospital Day: 3  Referring Provider: No ref. provider found      Patient Seen, Chart, Consults, Notes, Labs, Radiology studies reviewed.      Subjective:  Mini Gentile is a 61 y.o. female  whom we were consulted for ESRD and maintenance HD. Patient dialyzes in  clinic on TTS.  She was recently hospitalized for C.Diff and pseudomonal bacteremia.  She was discharged back to [facility]F on 6/24/21.  She then developed a fever of 102, diarrhea, ams, and right shoulder pain (has a known history of right shoulder fracture). Patient was first admitted to CCU but was then transferred to the floor .  Patient's main complaint was diarrhea.  She is status post dialysis today.  Patient is also doing better.    Allergies:  Bupropion, Chantix [varenicline], Gabapentin, Azithromycin, Levofloxacin, Penicillins, and Sulfa antibiotics    Home Meds:  Medications Prior to Admission   Medication Sig Dispense Refill Last Dose   • aspirin 81 MG chewable tablet Chew 81 mg Daily.   6/29/2021 at Unknown time   • atropine 1 % ophthalmic solution Administer 1 drop into the left eye Daily.   6/29/2021 at Unknown time   • brimonidine (ALPHAGAN) 0.2 % ophthalmic solution Administer 1 drop into the left eye 2 (two) times a day.   6/29/2021 at Unknown time   • carvedilol (COREG) 6.25 MG tablet Take 6.25 mg by mouth 2 (Two) Times a Day With Meals.   6/29/2021 at Unknown time   • cholecalciferol (VITAMIN D3) 25 MCG (1000 UT) tablet Take 2,000 Units by mouth Daily.   6/29/2021 at Unknown time   • dorzolamide  (TRUSOPT) 2 % ophthalmic solution Administer 1 drop into the left eye 2 (Two) Times a Day.   2021 at Unknown time   • famotidine (PEPCID) 20 MG tablet Take 1 tablet by mouth Daily.   2021 at Unknown time   • Ferric Citrate 1  MG(Fe) tablet Take 1 tablet by mouth 3 (Three) Times a Day With Meals.   2021 at Unknown time   • fluticasone (FLONASE) 50 MCG/ACT nasal spray 2 sprays into the nostril(s) as directed by provider 2 (Two) Times a Day.   2021 at Unknown time   • lactobacillus acidophilus (RISAQUAD) capsule capsule Take 1 capsule by mouth Daily. 30 capsule 2 2021 at Unknown time   • ondansetron (ZOFRAN) 4 MG tablet Take 1 tablet by mouth Every 6 (Six) Hours As Needed for Nausea or Vomiting. 24 tablet 2 2021 at Unknown time   • sertraline (ZOLOFT) 25 MG tablet Take 1 tablet by mouth Daily.   2021 at Unknown time   • sevelamer (RENAGEL) 800 MG tablet Take 800 mg by mouth 3 (Three) Times a Day With Meals.   2021 at Unknown time   • timolol (TIMOPTIC) 0.5 % ophthalmic solution Administer 1 drop into the left eye Every Morning.  12 2021 at Unknown time   • acetaminophen (TYLENOL) 325 MG tablet Take 650 mg by mouth Every 4 (Four) Hours As Needed for Mild Pain  or Fever.   Unknown at Unknown time   • ipratropium-albuterol (DUO-NEB) 0.5-2.5 mg/3 ml nebulizer Take 3 mL by nebulization Every 6 (Six) Hours As Needed for Wheezing or Shortness of Air.   Unknown at Unknown time   • [] vancomycin 50 MG/ML reconstituted solution oral solution reconstituted Take 2.5 mL by mouth Every 6 (Six) Hours for 18 doses. Indications: Clostridium Difficile Infection 45 mL 0        Medicines:  Current Facility-Administered Medications   Medication Dose Route Frequency Provider Last Rate Last Admin   • acetaminophen (TYLENOL) tablet 650 mg  650 mg Oral Q4H PRN Sarahy Mike APRN   650 mg at 21 0951    Or   • acetaminophen (TYLENOL) suppository 650 mg  650 mg Rectal Q4H PRN  Sarahy Mike APRN       • aspirin EC tablet 81 mg  81 mg Oral Daily Sarahy Mike APRN   81 mg at 07/03/21 0952   • atropine 1 % ophthalmic solution 1 drop  1 drop Left Eye Daily Sarahy Mike APRN   1 drop at 07/03/21 0953   • brimonidine (ALPHAGAN) 0.2 % ophthalmic solution 1 drop  1 drop Left Eye BID Sarahy Mike APRN   1 drop at 07/03/21 0953   • carvedilol (COREG) tablet 6.25 mg  6.25 mg Oral BID With Meals Sarahy Mike APRN   6.25 mg at 07/02/21 1835   • dextrose (D50W) 25 g/ 50mL Intravenous Solution 25 g  25 g Intravenous Q15 Min PRN Sarahy Mike APRN       • dextrose (GLUTOSE) oral gel 15 g  15 g Oral Q15 Min PRN Sarahy Mike APRAC       • famotidine (PEPCID) tablet 20 mg  20 mg Oral Daily Sarahy Mike APRN   20 mg at 07/03/21 0952   • glucagon (human recombinant) (GLUCAGEN DIAGNOSTIC) injection 1 mg  1 mg Subcutaneous Q15 Min PRN Sarahy Mike APRN       • heparin (porcine) 5000 UNIT/ML injection 5,000 Units  5,000 Units Subcutaneous Q12H Sarahy Mike APRN   5,000 Units at 07/03/21 0952   • insulin regular (humuLIN R,novoLIN R) injection 2-7 Units  2-7 Units Subcutaneous Q6H Sarahy Mike APRN   2 Units at 07/03/21 1155   • nicotine (NICODERM CQ) 21 MG/24HR patch 1 patch  1 patch Transdermal Q24H Sundeep Aldridge MD   1 patch at 07/02/21 2018   • ondansetron (ZOFRAN) injection 4 mg  4 mg Intravenous Q6H PRN Sarahy Mike APRN       • pravastatin (PRAVACHOL) tablet 10 mg  10 mg Oral Nightly Sarahy Mike APRN   10 mg at 07/02/21 2017   • saccharomyces boulardii (FLORASTOR) capsule 500 mg  500 mg Oral BID Woeltz, Sarahy K, APRN   500 mg at 07/03/21 0952   • sertraline (ZOLOFT) tablet 25 mg  25 mg Oral Daily Woeltz, Sarhay K, APRN   25 mg at 07/03/21 0954   • sodium chloride 0.9 % flush 10 mL  10 mL Intravenous PRN Woeltz, Sarahy K, APRN       • sodium chloride 0.9 % flush 10 mL  10 mL Intravenous Q12H Woeltz,  Sarahy MONTERO, APRN   10 mL at 21 0954   • sodium chloride 0.9 % flush 10 mL  10 mL Intravenous PRN Sarahy Mike APRN       • timolol (TIMOPTIC) 0.5 % ophthalmic solution 1 drop  1 drop Left Eye QAM Sarahy Mike APRN   1 drop at 21 0953   • vancomycin oral solution 125 mg  125 mg Oral Q6H Sarahy Mike APRN   125 mg at 21 1152       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)    • End stage renal disease on dialysis (CMS/[facility])    • Eustachian tube dysfunction    • GERD (gastroesophageal reflux disease)    • Glaucoma    • HTN (hypertension)    • Hyperlipidemia    • Mucoid otitis media    • Noncompliance of patient with renal dialysis (CMS/[facility])    • Tobacco abuse        Past Surgical History:  Past Surgical History:   Procedure Laterality Date   • ARTERIOVENOUS FISTULA     • CATARACT EXTRACTION WITH INTRAOCULAR LENS IMPLANT     •  SECTION     • CHOLECYSTECTOMY     • MYRINGOTOMY W/ TUBES Bilateral    • MYRINGOTOMY W/ TUBES Right    • TUBAL ABDOMINAL LIGATION         Family History  Family History   Problem Relation Age of Onset   • Heart disease Mother    • Diabetes Father    • Colon cancer Neg Hx    • Colon polyps Neg Hx        Social History  Social History     Socioeconomic History   • Marital status: Single     Spouse name: Not on file   • Number of children: Not on file   • Years of education: Not on file   • Highest education level: Not on file   Tobacco Use   • Smoking status: Current Every Day Smoker     Packs/day: 2.00     Years: 6.00     Pack years: 12.00     Types: Cigarettes   • Smokeless tobacco: Never Used   Vaping Use   • Vaping Use: Never used   Substance and Sexual Activity   • Alcohol use: No   • Drug use: No   • Sexual activity: Defer       Review of Systems:  History obtained from chart review and the patient  General ROS: No fever or  "chills  Respiratory ROS: No cough, shortness of breath, wheezing  Cardiovascular ROS: no chest pain or dyspnea on exertion  Gastrointestinal ROS: No abdominal pain or melena  Genito-Urinary ROS: No dysuria or hematuria  Musculoskeletal: negative  Skin: negative    Objective:  /46 (BP Location: Right leg, Patient Position: Lying)   Pulse 76   Temp 97.6 °F (36.4 °C) (Oral)   Resp 18   Ht 147.3 cm (58\")   Wt 63.9 kg (140 lb 14 oz)   SpO2 98%   BMI 29.44 kg/m²     Intake/Output Summary (Last 24 hours) at 7/3/2021 1332  Last data filed at 7/3/2021 0915  Gross per 24 hour   Intake --   Output 2150 ml   Net -2150 ml       Physical examination:  General: awake/alert   Chest:  clear to auscultation bilaterally without respiratory distress  CVS: regular rate and rhythm  Abdominal: soft, nontender, normal bowel sounds  Extremities: no cyanosis or edema  Skin: warm and dry without rash  Neuro: No focal motor deficits    Labs:  Lab Results (last 24 hours)     Procedure Component Value Units Date/Time    POC Glucose Once [[id number]]  (Abnormal) Collected: 07/03/21 1151    Specimen: Blood Updated: 07/03/21 1202     Glucose 176 mg/dL      Comment: : [id number] Hoda PulseSockseter ID: [id number]       Blood Culture - Blood, Wrist, Right [[id number]] Collected: 06/30/21 1030    Specimen: Blood from Wrist, Right Updated: 07/03/21 1100     Blood Culture No growth at 3 days    Blood Culture - Blood, Hand, Left [[id number]] Collected: 06/30/21 1032    Specimen: Blood from Hand, Left Updated: 07/03/21 1100     Blood Culture No growth at 3 days    Basic Metabolic Panel [[id number]]  (Abnormal) Collected: 07/03/21 0459    Specimen: Blood Updated: 07/03/21 0540     Glucose 122 mg/dL      BUN 30 mg/dL      Creatinine 3.93 mg/dL      Sodium 132 mmol/L      Potassium 4.8 mmol/L      Chloride 91 mmol/L      CO2 28.0 mmol/L      Calcium 9.1 mg/dL      eGFR   Amer --     Comment: <15 Indicative of kidney failure.        eGFR " Non African Amer 12 mL/min/1.73      Comment: <15 Indicative of kidney failure.        BUN/Creatinine Ratio 7.6     Anion Gap 13.0 mmol/L     Narrative:      GFR Normal >60  Chronic Kidney Disease <60  Kidney Failure <15      POC Glucose Once [[id number]]  (Normal) Collected: 07/03/21 0521    Specimen: Blood Updated: 07/03/21 0532     Glucose 126 mg/dL      Comment: : [id number] KickAss CandyhMeter ID: [id number]       CBC (No Diff) [[id number]]  (Abnormal) Collected: 07/03/21 0459    Specimen: Blood Updated: 07/03/21 0521     WBC 4.80 10*3/mm3      RBC 3.65 10*6/mm3      Hemoglobin 10.8 g/dL      Hematocrit 36.0 %      MCV 98.6 fL      MCH 29.6 pg      MCHC 30.0 g/dL      RDW 17.2 %      RDW-SD 62.3 fl      MPV 9.4 fL      Platelets 198 10*3/mm3     POC Glucose Once [[id number]]  (Normal) Collected: 07/03/21 0021    Specimen: Blood Updated: 07/03/21 0032     Glucose 129 mg/dL      Comment: : [id number] KickAss CandyhMeter ID: [id number]       POC Glucose Once [[id number]]  (Abnormal) Collected: 07/02/21 1834    Specimen: Blood Updated: 07/02/21 1846     Glucose 237 mg/dL      Comment: : [id number] Dominique SoaresMeter ID: [id number]             Radiology:   Imaging Results (Last 24 Hours)     ** No results found for the last 24 hours. **              Assessment   -ESRD maintenance HD on TTS ([facility])  -C.Diff diarrhea  -AMS  -Fever  -Right pleural effusion  -DM2  -HTN  -Anemia of CKD    Plan:  Dialysis is due next on 7/6.  Follow-up labs.      Ten Boyd MD  7/3/2021  13:32 CDT

## 2021-07-04 NOTE — PAYOR COMM NOTE
"7/3 CLINICAL UPDATE  [id number]   [id number]    Mini Peña (61 y.o. Female)     Date of Birth Social Security Number Address Home Phone MRN    1960  [address] [place] [id number] [phone] [id number]    Sabianist Marital Status          Other Single       Admission Date Admission Type Admitting Provider Attending Provider Department, Room/Bed    6/30/21 Emergency Bin Guy, Bin Valiente,  [facility] 3A, 335/1    Discharge Date Discharge Disposition Discharge Destination                       Attending Provider: Bin Guy DO    Allergies: Bupropion, Chantix [Varenicline], Gabapentin, Azithromycin, Levofloxacin, Penicillins, Sulfa Antibiotics    Isolation: Spore   Infection: C.difficile (06/17/21)   Code Status: CPR    Ht: 147.3 cm (58\")   Wt: 63.9 kg (140 lb 14 oz)    Admission Cmt: None   Principal Problem: Altered mental status [R41.82]                 Active Insurance as of 6/30/2021     Primary Coverage     Payor Plan Insurance Group Employer/Plan Group    [facility] MEDICARE REPLACEMENT WELL[employer] MEDICARE REPLACEMENT      Payor Plan Address Payor Plan Phone Number Payor Plan Fax Number Effective Dates    [address] [address] [phone]  4/1/2021 - None Entered    [facility] [id number]       Subscriber Name Subscriber Birth Date Member ID       MINI PEÑA 1960 [id number]           Secondary Coverage     Payor Plan Insurance Group Employer/Plan Group    [facility] WELLCARE MEDICAID      Payor Plan Address Payor Plan Phone Number Payor Plan Fax Number Effective Dates    [address] [address] [phone]  11/4/2016 - None Entered    [facility] [id number]       Subscriber Name Subscriber Birth Date Member ID       MINI PEÑA 1960 [id number]                 Emergency Contacts      (Rel.) Home Phone Work Phone Mobile Phone    Erica Bowling (Other) [phone] -- [phone]    Jose Bowling (Significant Other) " [phone] -- [phone]    Arlette Barker (Daughter) -- -- [phone]            Vital Signs (last day)     Date/Time   Temp   Temp src   Pulse   Resp   BP   Patient Position   SpO2    07/03/21 2345   97.9 (36.6)   Oral   77   16   148/76   Lying   100    07/03/21 2009   98.2 (36.8)   Oral   78   18   150/64   Lying   99    07/03/21 1710   --   --   60   --   --   --   --    07/03/21 1500   98.3 (36.8)   Oral   71   18   134/56   Lying   95    07/03/21 0915   97.6 (36.4)   Oral   76   18   121/46   Lying   98              Oxygen Therapy (last day)     Date/Time   SpO2   Device (Oxygen Therapy)   Flow (L/min)   Oxygen Concentration (%)   ETCO2 (mmHg)    07/03/21 2345   100   room air   --   --   --    07/03/21 2009   99   room air   --   --   --    07/03/21 1500   95   room air   --   --   --    07/03/21 0951   --   room air   --   --   --    07/03/21 0915   98   room air   --   --   --              Intake & Output (last day)       07/03 0701 - 07/04 0700    P.O. 240    Total Intake(mL/kg) 240 (3.8)    Other 2150    Stool 0    Total Output 2150    Net -1910         Stool Unmeasured Occurrence 4 x        Current Facility-Administered Medications   Medication Dose Route Frequency Provider Last Rate Last Admin   • acetaminophen (TYLENOL) tablet 650 mg  650 mg Oral Q4H PRN Sarahy Mike APRN   650 mg at 07/03/21 2220    Or   • acetaminophen (TYLENOL) suppository 650 mg  650 mg Rectal Q4H PRN Sarahy Mike APRN       • aspirin EC tablet 81 mg  81 mg Oral Daily Sarahy Mike APRN   81 mg at 07/03/21 0952   • atropine 1 % ophthalmic solution 1 drop  1 drop Left Eye Daily Sarahy Mike APRN   1 drop at 07/03/21 0953   • brimonidine (ALPHAGAN) 0.2 % ophthalmic solution 1 drop  1 drop Left Eye BID Sarahy Mike APRN   1 drop at 07/03/21 2222   • carvedilol (COREG) tablet 6.25 mg  6.25 mg Oral BID With Meals Sarahy Mike APRN   6.25 mg at 07/02/21 1835   • dextrose (D50W) 25 g/ 50mL  Intravenous Solution 25 g  25 g Intravenous Q15 Min PRN Woeltz, Sarahy K, APRN       • dextrose (GLUTOSE) oral gel 15 g  15 g Oral Q15 Min PRN Woeltz, Sarahy K, APRN       • famotidine (PEPCID) tablet 20 mg  20 mg Oral Daily Woeltz, Sarahy K, APRN   20 mg at 07/03/21 0952   • glucagon (human recombinant) (GLUCAGEN DIAGNOSTIC) injection 1 mg  1 mg Subcutaneous Q15 Min PRN Woeltz, Sarahy K, APRN       • heparin (porcine) 5000 UNIT/ML injection 5,000 Units  5,000 Units Subcutaneous Q12H Woeltz, Sarahy K, APRN   5,000 Units at 07/03/21 2221   • insulin regular (humuLIN R,novoLIN R) injection 2-7 Units  2-7 Units Subcutaneous TID With Meals Bin Guy DO   3 Units at 07/03/21 1712   • nicotine (NICODERM CQ) 21 MG/24HR patch 1 patch  1 patch Transdermal Q24H Sundeep Aldridge MD   1 patch at 07/03/21 2221   • ondansetron (ZOFRAN) injection 4 mg  4 mg Intravenous Q6H PRN Woeltulises, Sarahy K, APRN       • pravastatin (PRAVACHOL) tablet 10 mg  10 mg Oral Nightly Woeltz, Sarahy K, APRN   10 mg at 07/03/21 2220   • saccharomyces boulardii (FLORASTOR) capsule 500 mg  500 mg Oral BID Woeltz, Sarahy K, APRN   500 mg at 07/03/21 2221   • sertraline (ZOLOFT) tablet 25 mg  25 mg Oral Daily Woeltz, Sarahy K, APRN   25 mg at 07/03/21 0954   • sodium chloride 0.9 % flush 10 mL  10 mL Intravenous PRN Woeltz, Sarahy K, APRN       • sodium chloride 0.9 % flush 10 mL  10 mL Intravenous Q12H Woeltz, Sarahy K, APRN   10 mL at 07/03/21 2222   • sodium chloride 0.9 % flush 10 mL  10 mL Intravenous PRN Woeltz, Sarahy K, APRN       • timolol (TIMOPTIC) 0.5 % ophthalmic solution 1 drop  1 drop Left Eye QAM Sarahy Mike APRN   1 drop at 07/03/21 0953   • vancomycin oral solution 125 mg  125 mg Oral Q6H Sarahy Mike APRN   125 mg at 07/03/21 2311     Lab Results (last 24 hours)     Procedure Component Value Units Date/Time    POC Glucose Once [[id number]]  (Abnormal) Collected: 07/03/21 6148     Specimen: Blood Updated: 07/03/21 2324     Glucose 169 mg/dL      Comment: : [id number] Concetta CrystalMeter ID: [id number]       POC Glucose Once [[id number]]  (Abnormal) Collected: 07/03/21 1708    Specimen: Blood Updated: 07/03/21 1719     Glucose 201 mg/dL      Comment: : [id number] Hoda LindsayMeter ID: [id number]       POC Glucose Once [[id number]]  (Abnormal) Collected: 07/03/21 1151    Specimen: Blood Updated: 07/03/21 1202     Glucose 176 mg/dL      Comment: : [id number] Hoda LindsayMeter ID: [id number]           Imaging Results (Last 24 Hours)     ** No results found for the last 24 hours. **        Orders (last 24 hrs)      Start     Ordered    07/03/21 2325  POC Glucose Once  Once      07/03/21 2312    07/03/21 2000  Vital Signs Every Hour and Per Hospital Policy Based on Patient Condition  Every 4 Hours      07/03/21 1959 07/03/21 1800  insulin regular (humuLIN R,novoLIN R) injection 2-7 Units  3 Times Daily With Meals      07/03/21 1450    07/03/21 1720  POC Glucose Once  Once      07/03/21 1708    07/03/21 1700  POC Glucose 4x Daily AC & at Bedtime  4 Times Daily Before Meals & at Bedtime      07/03/21 1450    07/03/21 1203  POC Glucose Once  Once      07/03/21 1151    07/02/21 1200  saccharomyces boulardii (FLORASTOR) capsule 500 mg  2 Times Daily      07/02/21 1104    07/01/21 2215  nicotine (NICODERM CQ) 21 MG/24HR patch 1 patch  Every 24 Hours Scheduled      07/01/21 2200    07/01/21 0900  sertraline (ZOLOFT) tablet 25 mg  Daily      06/30/21 1617    07/01/21 0900  aspirin EC tablet 81 mg  Daily      06/30/21 1617    07/01/21 0900  atropine 1 % ophthalmic solution 1 drop  Daily      06/30/21 1617    07/01/21 0700  timolol (TIMOPTIC) 0.5 % ophthalmic solution 1 drop  Every Morning      06/30/21 1617    06/30/21 2100  pravastatin (PRAVACHOL) tablet 10 mg  Nightly      06/30/21 1617    06/30/21 2100  brimonidine (ALPHAGAN) 0.2 % ophthalmic solution 1 drop  2 Times Daily      06/30/21  1617    06/30/21 2100  sodium chloride 0.9 % flush 10 mL  Every 12 Hours Scheduled      06/30/21 1619 06/30/21 2100  heparin (porcine) 5000 UNIT/ML injection 5,000 Units  Every 12 Hours Scheduled      06/30/21 1619    06/30/21 1800  carvedilol (COREG) tablet 6.25 mg  2 Times Daily With Meals      06/30/21 1617    06/30/21 1800  vancomycin oral solution 125 mg  Every 6 Hours Scheduled      06/30/21 1620    06/30/21 1800  POC Glucose Q6H  Every 6 Hours,   Status:  Canceled      06/30/21 1709    06/30/21 1800  insulin regular (humuLIN R,novoLIN R) injection 2-7 Units  Every 6 Hours Scheduled,   Status:  Discontinued      06/30/21 1709 06/30/21 1715  famotidine (PEPCID) tablet 20 mg  Daily      06/30/21 1617    06/30/21 1709  dextrose (GLUTOSE) oral gel 15 g  Every 15 Minutes PRN      06/30/21 1709    06/30/21 1709  dextrose (D50W) 25 g/ 50mL Intravenous Solution 25 g  Every 15 Minutes PRN      06/30/21 1709    06/30/21 1709  glucagon (human recombinant) (GLUCAGEN DIAGNOSTIC) injection 1 mg  Every 15 Minutes PRN      06/30/21 1709    06/30/21 1700  Vital Signs Every Hour and Per Hospital Policy Based on Patient Condition  Every Hour,   Status:  Canceled      06/30/21 1619    06/30/21 1700  Intake and Output  Every Hour      06/30/21 1619    06/30/21 1620  Daily Weights  Daily      06/30/21 1619 06/30/21 1618  ondansetron (ZOFRAN) injection 4 mg  Every 6 Hours PRN      06/30/21 1619    06/30/21 1618  acetaminophen (TYLENOL) tablet 650 mg  Every 4 Hours PRN      06/30/21 1619    06/30/21 1618  acetaminophen (TYLENOL) suppository 650 mg  Every 4 Hours PRN      06/30/21 1619    06/30/21 1617  sodium chloride 0.9 % flush 10 mL  As Needed      06/30/21 1619 06/30/21 0959  sodium chloride 0.9 % flush 10 mL  As Needed      06/30/21 0959    --  SCANNED - TELEMETRY        06/30/21 0000    Pending  NPO Diet  Diet Effective Now     Comments: Should remain in effect until a complete SLP evaluation occurs and a  superceding MD order is received.    Pending    Pending  SLP Consult: Eval & Treat RN Dysphagia Screen Failed  Once      Pending    --  SCANNED EKG      06/30/21 0000    Signed and Held  sodium chloride 0.9 % bolus 100 mL  As Needed      Signed and Held    Signed and Held  albumin human 25 % IV SOLN 12.5 g  As Needed      Signed and Held    --  sevelamer (RENAGEL) 800 MG tablet  3 Times Daily With Meals      07/02/21 0937    --  aspirin 81 MG chewable tablet  Daily      07/02/21 0937    --  cholecalciferol (VITAMIN D3) 25 MCG (1000 UT) tablet  Daily      07/02/21 0937                   Physician Progress Notes (last 24 hours) (Notes from 07/03/21 0601 through 07/04/21 0601)      Fabio Glover MD at 07/03/21 1432          Infectious Diseases Progress Note    Patient:  Mini Gentile  YOB: 1960  MRN: [id number]   Admit date: 6/30/2021   Admitting Physician: Bin Guy DO  Primary Care Physician: Bharati Dahl APRN    Chief Complaint/Interval History: She feels okay.  She had good oral intake last night for dinner and again with breakfast today and also with lunch.  She does not report watery bowel movements.  She indicates stools almost formed.  Slightly soft in consistency.  No abdominal pain or nausea.    Intake/Output Summary (Last 24 hours) at 7/3/2021 1432  Last data filed at 7/3/2021 0915  Gross per 24 hour   Intake --   Output 2150 ml   Net -2150 ml     Allergies:   Allergies   Allergen Reactions   • Bupropion Nausea Only   • Chantix [Varenicline] Nausea And Vomiting     Does not remember   • Gabapentin Hallucinations     hallucinations   • Azithromycin Rash   • Levofloxacin Rash   • Penicillins Rash   • Sulfa Antibiotics Rash     Current Scheduled Medications:   aspirin, 81 mg, Oral, Daily  atropine, 1 drop, Left Eye, Daily  brimonidine, 1 drop, Left Eye, BID  carvedilol, 6.25 mg, Oral, BID With Meals  famotidine, 20 mg, Oral, Daily  heparin (porcine), 5,000 Units, Subcutaneous,  "Q12H  insulin regular, 2-7 Units, Subcutaneous, Q6H  nicotine, 1 patch, Transdermal, Q24H  pravastatin, 10 mg, Oral, Nightly  saccharomyces boulardii, 500 mg, Oral, BID  sertraline, 25 mg, Oral, Daily  sodium chloride, 10 mL, Intravenous, Q12H  timolol, 1 drop, Left Eye, QAM  vancomycin, 125 mg, Oral, Q6H      Current PRN Medications:  •  acetaminophen **OR** acetaminophen  •  dextrose  •  dextrose  •  glucagon (human recombinant)  •  ondansetron  •  [COMPLETED] Insert peripheral IV **AND** sodium chloride  •  sodium chloride    Review of Systems see HPI    Vital Signs:  /46 (BP Location: Right leg, Patient Position: Lying)   Pulse 76   Temp 97.6 °F (36.4 °C) (Oral)   Resp 18   Ht 147.3 cm (58\")   Wt 63.9 kg (140 lb 14 oz)   SpO2 98%   BMI 29.44 kg/m²     Physical Exam  Vital signs - reviewed.  Abdomen soft and nontender.  No guarding or rebound.    Lab Results:  CBC:   Results from last 7 days   Lab Units 07/03/21 0459 07/02/21 0440 07/01/21 0242 06/30/21  1030   WBC 10*3/mm3 4.80 6.40 10.66 12.66*   HEMOGLOBIN g/dL 10.8* 10.0* 10.1* 10.8*   HEMATOCRIT % 36.0 33.7* 33.7* 35.3   PLATELETS 10*3/mm3 198 188 199 255     BMP:  Results from last 7 days   Lab Units 07/03/21 0459 07/02/21 0440 07/01/21 0242 06/30/21  1124   SODIUM mmol/L 132* 136 135* 137   POTASSIUM mmol/L 4.8 4.4 4.8 4.2   CHLORIDE mmol/L 91* 96* 94* 94*   CO2 mmol/L 28.0 30.0* 28.0 29.0   BUN mg/dL 30* 23 31* 26*   CREATININE mg/dL 3.93* 3.24* 4.33* 3.75*   GLUCOSE mg/dL 122* 139* 82 210*   CALCIUM mg/dL 9.1 8.8 8.7 9.0   ALT (SGPT) U/L  --   --   --  6     Culture Results:   Blood Culture   Date Value Ref Range Status   06/30/2021 No growth at 3 days  Preliminary   06/30/2021 No growth at 3 days  Preliminary     Radiology: None  Additional Studies Reviewed: None    Impression:   C. difficile colitis.  Improving.  Tolerating oral intake.    Recommendations:   Okay with me for discharge when ready for release per primary " service  Suggest vancomycin 125 mg orally 4 times daily-would recommend a 14-day course from initiation  Would be happy to reassess if additional questions for infectious diseases  Otherwise will sign off for now  Follow-up with infectious diseases as needed    Fabio Glover MD    Electronically signed by Fabio Glover MD at 07/03/21 1606     Ten Boyd MD at 07/03/21 1332          PROGRESS NOTE.      Patient:  Mini Gentile  YOB: 1960  Date of Service: 7/3/2021  MRN: [id number]   Acct: [id number]   Primary Care Physician: Bharati Dahl APRN  Advance Directive:   Code Status and Medical Interventions:   Ordered at: 06/30/21 1620     Level Of Support Discussed With:    Patient     Code Status:    CPR     Medical Interventions (Level of Support Prior to Arrest):    Full     Admit Date: 6/30/2021       Hospital Day: 3  Referring Provider: No ref. provider found      Patient Seen, Chart, Consults, Notes, Labs, Radiology studies reviewed.      Subjective:  Mini Gentile is a 61 y.o. female  whom we were consulted for ESRD and maintenance HD. Patient dialyzes in [facility] clinic on TTS.  She was recently hospitalized for C.Diff and pseudomonal bacteremia.  She was discharged back to SNF on 6/24/21.  She then developed a fever of 102, diarrhea, ams, and right shoulder pain (has a known history of right shoulder fracture). Patient was first admitted to CCU but was then transferred to the floor .  Patient's main complaint was diarrhea.  She is status post dialysis today.  Patient is also doing better.    Allergies:  Bupropion, Chantix [varenicline], Gabapentin, Azithromycin, Levofloxacin, Penicillins, and Sulfa antibiotics    Home Meds:  Medications Prior to Admission   Medication Sig Dispense Refill Last Dose   • aspirin 81 MG chewable tablet Chew 81 mg Daily.   6/29/2021 at Unknown time   • atropine 1 % ophthalmic solution Administer 1 drop into the left eye Daily.   6/29/2021 at  Unknown time   • brimonidine (ALPHAGAN) 0.2 % ophthalmic solution Administer 1 drop into the left eye 2 (two) times a day.   2021 at Unknown time   • carvedilol (COREG) 6.25 MG tablet Take 6.25 mg by mouth 2 (Two) Times a Day With Meals.   2021 at Unknown time   • cholecalciferol (VITAMIN D3) 25 MCG (1000 UT) tablet Take 2,000 Units by mouth Daily.   2021 at Unknown time   • dorzolamide (TRUSOPT) 2 % ophthalmic solution Administer 1 drop into the left eye 2 (Two) Times a Day.   2021 at Unknown time   • famotidine (PEPCID) 20 MG tablet Take 1 tablet by mouth Daily.   2021 at Unknown time   • Ferric Citrate 1  MG(Fe) tablet Take 1 tablet by mouth 3 (Three) Times a Day With Meals.   2021 at Unknown time   • fluticasone (FLONASE) 50 MCG/ACT nasal spray 2 sprays into the nostril(s) as directed by provider 2 (Two) Times a Day.   2021 at Unknown time   • lactobacillus acidophilus (RISAQUAD) capsule capsule Take 1 capsule by mouth Daily. 30 capsule 2 2021 at Unknown time   • ondansetron (ZOFRAN) 4 MG tablet Take 1 tablet by mouth Every 6 (Six) Hours As Needed for Nausea or Vomiting. 24 tablet 2 2021 at Unknown time   • sertraline (ZOLOFT) 25 MG tablet Take 1 tablet by mouth Daily.   2021 at Unknown time   • sevelamer (RENAGEL) 800 MG tablet Take 800 mg by mouth 3 (Three) Times a Day With Meals.   2021 at Unknown time   • timolol (TIMOPTIC) 0.5 % ophthalmic solution Administer 1 drop into the left eye Every Morning.  12 2021 at Unknown time   • acetaminophen (TYLENOL) 325 MG tablet Take 650 mg by mouth Every 4 (Four) Hours As Needed for Mild Pain  or Fever.   Unknown at Unknown time   • ipratropium-albuterol (DUO-NEB) 0.5-2.5 mg/3 ml nebulizer Take 3 mL by nebulization Every 6 (Six) Hours As Needed for Wheezing or Shortness of Air.   Unknown at Unknown time   • [] vancomycin 50 MG/ML reconstituted solution oral solution reconstituted Take 2.5 mL by  mouth Every 6 (Six) Hours for 18 doses. Indications: Clostridium Difficile Infection 45 mL 0        Medicines:  Current Facility-Administered Medications   Medication Dose Route Frequency Provider Last Rate Last Admin   • acetaminophen (TYLENOL) tablet 650 mg  650 mg Oral Q4H PRN Sarahy Mike, APRN   650 mg at 07/03/21 0951    Or   • acetaminophen (TYLENOL) suppository 650 mg  650 mg Rectal Q4H PRN Sarahy Mike, APRN       • aspirin EC tablet 81 mg  81 mg Oral Daily Sarahy Mike, APRN   81 mg at 07/03/21 0952   • atropine 1 % ophthalmic solution 1 drop  1 drop Left Eye Daily Sarahy Mike APRN   1 drop at 07/03/21 0953   • brimonidine (ALPHAGAN) 0.2 % ophthalmic solution 1 drop  1 drop Left Eye BID Sarahy Mike, APRN   1 drop at 07/03/21 0953   • carvedilol (COREG) tablet 6.25 mg  6.25 mg Oral BID With Meals Sarahy Mike APRN   6.25 mg at 07/02/21 1835   • dextrose (D50W) 25 g/ 50mL Intravenous Solution 25 g  25 g Intravenous Q15 Min PRN Sarahy Mike APRN       • dextrose (GLUTOSE) oral gel 15 g  15 g Oral Q15 Min PRN Sarahy Mike, APRN       • famotidine (PEPCID) tablet 20 mg  20 mg Oral Daily Sarahy Mike APRN   20 mg at 07/03/21 0952   • glucagon (human recombinant) (GLUCAGEN DIAGNOSTIC) injection 1 mg  1 mg Subcutaneous Q15 Min PRN Sarahy Mike, APRN       • heparin (porcine) 5000 UNIT/ML injection 5,000 Units  5,000 Units Subcutaneous Q12H Sarahy Mike APRN   5,000 Units at 07/03/21 0952   • insulin regular (humuLIN R,novoLIN R) injection 2-7 Units  2-7 Units Subcutaneous Q6H Sarahy Mike, APRN   2 Units at 07/03/21 1155   • nicotine (NICODERM CQ) 21 MG/24HR patch 1 patch  1 patch Transdermal Q24H Sundeep Aldridge MD   1 patch at 07/02/21 2018   • ondansetron (ZOFRAN) injection 4 mg  4 mg Intravenous Q6H PRN Sarahy Mike APRN       • pravastatin (PRAVACHOL) tablet 10 mg  10 mg Oral Nightly Sarahy Mike, APRN   10  mg at 21 2017   • saccharomyces boulardii (FLORASTOR) capsule 500 mg  500 mg Oral BID Woeltz, Sarahy K, APRN   500 mg at 21 0952   • sertraline (ZOLOFT) tablet 25 mg  25 mg Oral Daily Woeltz, Sarahy K, APRN   25 mg at 21 0954   • sodium chloride 0.9 % flush 10 mL  10 mL Intravenous PRN Woeltz, Sarahy K, APRN       • sodium chloride 0.9 % flush 10 mL  10 mL Intravenous Q12H Woeltz, Sarahy K, APRN   10 mL at 21 0954   • sodium chloride 0.9 % flush 10 mL  10 mL Intravenous PRN Woeltz, Sarahy K, APRN       • timolol (TIMOPTIC) 0.5 % ophthalmic solution 1 drop  1 drop Left Eye QAM Woeltz, Sarahy K, APRN   1 drop at 21 0953   • vancomycin oral solution 125 mg  125 mg Oral Q6H Woeltz, Sarahy K, APRN   125 mg at 21 1152       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    • Chronic otitis media    • Diabetes (CMS/[facility])    • End stage renal disease on dialysis (CMS/[facility])    • Eustachian tube dysfunction    • GERD (gastroesophageal reflux disease)    • Glaucoma    • HTN (hypertension)    • Hyperlipidemia    • Mucoid otitis media    • Noncompliance of patient with renal dialysis (CMS/[facility])    • Tobacco abuse        Past Surgical History:  Past Surgical History:   Procedure Laterality Date   • ARTERIOVENOUS FISTULA     • CATARACT EXTRACTION WITH INTRAOCULAR LENS IMPLANT     •  SECTION     • CHOLECYSTECTOMY     • MYRINGOTOMY W/ TUBES Bilateral    • MYRINGOTOMY W/ TUBES Right    • TUBAL ABDOMINAL LIGATION         Family History  Family History   Problem Relation Age of Onset   • Heart disease Mother    • Diabetes Father    • Colon cancer Neg Hx    • Colon polyps Neg Hx        Social History  Social History     Socioeconomic History   • Marital status: Single     Spouse name: Not on file   • Number of children: Not on file   • Years of education: Not on file   • Highest education level: Not on file   Tobacco  "Use   • Smoking status: Current Every Day Smoker     Packs/day: 2.00     Years: 6.00     Pack years: 12.00     Types: Cigarettes   • Smokeless tobacco: Never Used   Vaping Use   • Vaping Use: Never used   Substance and Sexual Activity   • Alcohol use: No   • Drug use: No   • Sexual activity: Defer       Review of Systems:  History obtained from chart review and the patient  General ROS: No fever or chills  Respiratory ROS: No cough, shortness of breath, wheezing  Cardiovascular ROS: no chest pain or dyspnea on exertion  Gastrointestinal ROS: No abdominal pain or melena  Genito-Urinary ROS: No dysuria or hematuria  Musculoskeletal: negative  Skin: negative    Objective:  /46 (BP Location: Right leg, Patient Position: Lying)   Pulse 76   Temp 97.6 °F (36.4 °C) (Oral)   Resp 18   Ht 147.3 cm (58\")   Wt 63.9 kg (140 lb 14 oz)   SpO2 98%   BMI 29.44 kg/m²     Intake/Output Summary (Last 24 hours) at 7/3/2021 1332  Last data filed at 7/3/2021 0915  Gross per 24 hour   Intake --   Output 2150 ml   Net -2150 ml       Physical examination:  General: awake/alert   Chest:  clear to auscultation bilaterally without respiratory distress  CVS: regular rate and rhythm  Abdominal: soft, nontender, normal bowel sounds  Extremities: no cyanosis or edema  Skin: warm and dry without rash  Neuro: No focal motor deficits    Labs:  Lab Results (last 24 hours)     Procedure Component Value Units Date/Time    POC Glucose Once [[id number]]  (Abnormal) Collected: 07/03/21 1151    Specimen: Blood Updated: 07/03/21 1202     Glucose 176 mg/dL      Comment: : [id number] Hoda Flowermariela ID: [id number]       Blood Culture - Blood, Wrist, Right [[id number]] Collected: 06/30/21 1030    Specimen: Blood from Wrist, Right Updated: 07/03/21 1100     Blood Culture No growth at 3 days    Blood Culture - Blood, Hand, Left [[id number]] Collected: 06/30/21 1032    Specimen: Blood from Hand, Left Updated: 07/03/21 1100     Blood Culture No " growth at 3 days    Basic Metabolic Panel [[id number]]  (Abnormal) Collected: 07/03/21 0459    Specimen: Blood Updated: 07/03/21 0540     Glucose 122 mg/dL      BUN 30 mg/dL      Creatinine 3.93 mg/dL      Sodium 132 mmol/L      Potassium 4.8 mmol/L      Chloride 91 mmol/L      CO2 28.0 mmol/L      Calcium 9.1 mg/dL      eGFR   Amer --     Comment: <15 Indicative of kidney failure.        eGFR Non African Amer 12 mL/min/1.73      Comment: <15 Indicative of kidney failure.        BUN/Creatinine Ratio 7.6     Anion Gap 13.0 mmol/L     Narrative:      GFR Normal >60  Chronic Kidney Disease <60  Kidney Failure <15      POC Glucose Once [[id number]]  (Normal) Collected: 07/03/21 0521    Specimen: Blood Updated: 07/03/21 0532     Glucose 126 mg/dL      Comment: : [id number] Enobia Pharmaeter ID: [id number]       CBC (No Diff) [[id number]]  (Abnormal) Collected: 07/03/21 0459    Specimen: Blood Updated: 07/03/21 0521     WBC 4.80 10*3/mm3      RBC 3.65 10*6/mm3      Hemoglobin 10.8 g/dL      Hematocrit 36.0 %      MCV 98.6 fL      MCH 29.6 pg      MCHC 30.0 g/dL      RDW 17.2 %      RDW-SD 62.3 fl      MPV 9.4 fL      Platelets 198 10*3/mm3     POC Glucose Once [[id number]]  (Normal) Collected: 07/03/21 0021    Specimen: Blood Updated: 07/03/21 0032     Glucose 129 mg/dL      Comment: : [id number] [employer] ID: [id number]       POC Glucose Once [[id number]]  (Abnormal) Collected: 07/02/21 1834    Specimen: Blood Updated: 07/02/21 1846     Glucose 237 mg/dL      Comment: : [id number] Dominique Pyle ID: [id number]             Radiology:   Imaging Results (Last 24 Hours)     ** No results found for the last 24 hours. **              Assessment   -ESRD maintenance HD on TTS ([facility])  -C.Diff diarrhea  -AMS  -Fever  -Right pleural effusion  -DM2  -HTN  -Anemia of CKD    Plan:  Dialysis is due next on 7/4.  Follow-up labs.      Ten Boyd MD  7/3/2021  13:32  CDT    Electronically signed by Ten Boyd MD at 07/03/21 1333     Bin Guy DO at 07/03/21 1044              [facility] Medicine Services  INPATIENT PROGRESS NOTE    Patient Name: Mini Gentile  Date of Admission: 6/30/2021  Today's Date: 07/03/21  Length of Stay: 3  Primary Care Physician: Bharati Dahl APRN    Subjective   Chief Complaint: Follow-up C. Difficile    HPI   Patient feels like she is doing better today.  She feels like her bowel movements are starting to slow down.  No nausea or vomiting.  Tolerating p.o.  Afebrile.        Review of Systems   All pertinent negatives and positives are as above. All other systems have been reviewed and are negative unless otherwise stated.     Objective    Temp:  [97.6 °F (36.4 °C)-97.7 °F (36.5 °C)] 97.6 °F (36.4 °C)  Heart Rate:  [68-76] 76  Resp:  [18] 18  BP: (121-157)/(46-74) 121/46  Physical Exam  GEN: Awake, alert, interactive, in NAD  HEENT: Anicteric, Trachea midline  Lungs: CTAB, no wheezing/rales/rhonchi  Heart: RRR, +S1/s2, no rub  ABD: soft, nt/nd, +BS, no guarding/rebound  Extremities: atraumatic, no cyanosis  Skin: no rashes or lesions  Neuro: AAOx3, no focal deficits  Psych: normal mood & affect        Results Review:  I have reviewed the labs, radiology results, and diagnostic studies.    Laboratory Data:   Results from last 7 days   Lab Units 07/03/21 0459 07/02/21 0440 07/01/21  0242   WBC 10*3/mm3 4.80 6.40 10.66   HEMOGLOBIN g/dL 10.8* 10.0* 10.1*   HEMATOCRIT % 36.0 33.7* 33.7*   PLATELETS 10*3/mm3 198 188 199        Results from last 7 days   Lab Units 07/03/21 0459 07/02/21 0440 07/01/21  0242 06/30/21  1124   SODIUM mmol/L 132* 136 135* 137   POTASSIUM mmol/L 4.8 4.4 4.8 4.2   CHLORIDE mmol/L 91* 96* 94* 94*   CO2 mmol/L 28.0 30.0* 28.0 29.0   BUN mg/dL 30* 23 31* 26*   CREATININE mg/dL 3.93* 3.24* 4.33* 3.75*   CALCIUM mg/dL 9.1 8.8 8.7 9.0   BILIRUBIN mg/dL  --   --   --  0.9    ALK PHOS U/L  --   --   --  314*   ALT (SGPT) U/L  --   --   --  6   AST (SGOT) U/L  --   --   --  15   GLUCOSE mg/dL 122* 139* 82 210*       Culture Data:   Blood Culture   Date Value Ref Range Status   06/30/2021 No growth at 2 days  Preliminary   06/30/2021 No growth at 2 days  Preliminary       Radiology Data:   Imaging Results (Last 24 Hours)     ** No results found for the last 24 hours. **          I have reviewed the patient's current medications.     Assessment/Plan     Active Hospital Problems    Diagnosis    • **Altered mental status    • Fever    • Elevated troponin    • Fx humeral neck, right, with nonunion, subsequent encounter    • Pleural effusion on right    • C. difficile diarrhea    • Chronic anemia    • DM2 (diabetes mellitus, type 2) (CMS/[facility])    • HTN (hypertension)        Patient is improving.  Mental status has improved.  Afebrile.  Diarrhea improving.    Continue p.o. vancomycin and Florastor.  Patient ID input.    Ortho evaluating for shoulder but recommends ongoing monitoring.    H&H is stable    Continue meds for blood pressure and diabetes    Dialysis as per schedule/nephrology recommendations    Plan for return back to skilled nursing facility when insurance authorization approved.      Discharge Planning: I expect the patient to be discharged to skilled nursing facility when pre-CERT back.  Currently pending.    Electronically signed by Bin Guy DO, 07/03/21, 10:44 CDT.      Electronically signed by Bin Guy DO at 07/03/21 1447          Consult Notes (last 24 hours) (Notes from 07/03/21 0601 through 07/04/21 0601)      Jeff Springer MD at 07/03/21 1044            Consult  [facility]      Mini Gentile (1960)  7/3/2021    Reason for Consult: Fracture right proximal humerus  Requesting Physician: No ref. provider found      CHIEF COMPLAINT: Fracture right proximal humerus    History Obtained From: Patient this patient is a  poor historian.  She has a fracture of her right proximal humerus but does not remember any fall or accident or any time in which it might have happened    HISTORY OF PRESENT ILLNESS:                The patient is a 61 y.o. female who presents with above chief complaint.  The orthopedic service was consulted for evaluation of her right shoulder.  This patient apparently has had sepsis.  She has had an altered mental status the.  At some point she has had a fracture of the right proximal humerus.  X-rays reveal a fracture in the area of the surgical neck of the humerus with a delayed or nonunion and what appears to be avascular necrosis of the humeral head with some dissolution of the humeral head.  This fracture appears to be dated    Orthopedics was consulted on this patient.    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    • Chronic otitis media    • Diabetes (CMS/[facility])    • End stage renal disease on dialysis (CMS/[facility])    • Eustachian tube dysfunction    • GERD (gastroesophageal reflux disease)    • Glaucoma    • HTN (hypertension)    • Hyperlipidemia    • Mucoid otitis media    • Noncompliance of patient with renal dialysis (CMS/[facility])    • Tobacco abuse        Past Surgical History:    Past Surgical History:   Procedure Laterality Date   • ARTERIOVENOUS FISTULA     • CATARACT EXTRACTION WITH INTRAOCULAR LENS IMPLANT     •  SECTION     • CHOLECYSTECTOMY     • MYRINGOTOMY W/ TUBES Bilateral    • MYRINGOTOMY W/ TUBES Right    • TUBAL ABDOMINAL LIGATION         Current Medications:     Current Facility-Administered Medications:   •  acetaminophen (TYLENOL) tablet 650 mg, 650 mg, Oral, Q4H PRN, 650 mg at 21 0951 **OR** acetaminophen (TYLENOL) suppository 650 mg, 650 mg, Rectal, Q4H PRN, Sarahy Mike K, APRN  •  aspirin EC tablet 81 mg, 81 mg, Oral, Daily, Sarahy Mike K, APRN, 81 mg at 21 0952  •  atropine 1 %  ophthalmic solution 1 drop, 1 drop, Left Eye, Daily, DoorthyeltSamantha montgomerySarahy K, APRN, 1 drop at 07/03/21 0953  •  brimonidine (ALPHAGAN) 0.2 % ophthalmic solution 1 drop, 1 drop, Left Eye, BID, DorothyeltSamantha montgomerySarahy K, APRN, 1 drop at 07/03/21 0953  •  carvedilol (COREG) tablet 6.25 mg, 6.25 mg, Oral, BID With Meals, WoChavo carmenia K, APRN, 6.25 mg at 07/02/21 1835  •  dextrose (D50W) 25 g/ 50mL Intravenous Solution 25 g, 25 g, Intravenous, Q15 Min PRN, Chavo Mikeia K, APRN  •  dextrose (GLUTOSE) oral gel 15 g, 15 g, Oral, Q15 Min PRN, Woeltulises, Sarahy K, APRN  •  famotidine (PEPCID) tablet 20 mg, 20 mg, Oral, Daily, DorothyeltChavo montgomeryia K, APRN, 20 mg at 07/03/21 0952  •  glucagon (human recombinant) (GLUCAGEN DIAGNOSTIC) injection 1 mg, 1 mg, Subcutaneous, Q15 Min PRN, DorothyeltSamantha montgomerySarahy K, APRN  •  heparin (porcine) 5000 UNIT/ML injection 5,000 Units, 5,000 Units, Subcutaneous, Q12H, DorothyeltSamantha montgomerySarahy K, APRN, 5,000 Units at 07/03/21 0952  •  insulin regular (humuLIN R,novoLIN R) injection 2-7 Units, 2-7 Units, Subcutaneous, Q6H, Chavo Mikeia K, APRN, 3 Units at 07/02/21 1842  •  nicotine (NICODERM CQ) 21 MG/24HR patch 1 patch, 1 patch, Transdermal, Q24H, Sundeep Aldridge MD, 1 patch at 07/02/21 2018  •  ondansetron (ZOFRAN) injection 4 mg, 4 mg, Intravenous, Q6H PRN, WoeltzSamanthaSarahy K, APRN  •  pravastatin (PRAVACHOL) tablet 10 mg, 10 mg, Oral, Nightly, WoeltSamantha montgomerySarahy K, APRN, 10 mg at 07/02/21 2017  •  saccharomyces boulardii (FLORASTOR) capsule 500 mg, 500 mg, Oral, BID, Woeltz, Sarahy K, APRN, 500 mg at 07/03/21 0952  •  sertraline (ZOLOFT) tablet 25 mg, 25 mg, Oral, Daily, Woeltz, Sarahy K, APRN, 25 mg at 07/03/21 0954  •  [COMPLETED] Insert peripheral IV, , , Once **AND** sodium chloride 0.9 % flush 10 mL, 10 mL, Intravenous, PRN, Woeltz, Sarahy K, APRN  •  sodium chloride 0.9 % flush 10 mL, 10 mL, Intravenous, Q12H, Woeltz, Sarahy K, APRN, 10 mL at 07/03/21 0954  •  sodium chloride 0.9 % flush  10 mL, 10 mL, Intravenous, PRN, DorothyeltSamantha montgomerySarahy K, APRN  •  timolol (TIMOPTIC) 0.5 % ophthalmic solution 1 drop, 1 drop, Left Eye, QAM, Sarahy Mike K, APRN, 1 drop at 07/03/21 0953  •  vancomycin oral solution 125 mg, 125 mg, Oral, Q6H, DorothyeltSamantha montgomerySarahy K, APRN, Stopped at 07/03/21 0506    Allergies:  Bupropion, Chantix [varenicline], Gabapentin, Azithromycin, Levofloxacin, Penicillins, and Sulfa antibiotics    Social History:   Social History     Socioeconomic History   • Marital status: Single     Spouse name: Not on file   • Number of children: Not on file   • Years of education: Not on file   • Highest education level: Not on file   Tobacco Use   • Smoking status: Current Every Day Smoker     Packs/day: 2.00     Years: 6.00     Pack years: 12.00     Types: Cigarettes   • Smokeless tobacco: Never Used   Vaping Use   • Vaping Use: Never used   Substance and Sexual Activity   • Alcohol use: No   • Drug use: No   • Sexual activity: Defer       Family History:   Family History   Problem Relation Age of Onset   • Heart disease Mother    • Diabetes Father    • Colon cancer Neg Hx    • Colon polyps Neg Hx        REVIEW OF SYSTEMS:    Musculoskeletal: Known fracture of the right proximal humerus with minimal discomfort at this time patient is unaware of when this happened    PHYSICAL EXAM:        General Appearance:    Alert, cooperative, in no acute distress she is a poor historian.  She cannot relate to me why she is in the hospital or at what point she had an injury to her right shoulder   Head:    Normocephalic, without obvious abnormality, atraumatic   Eyes:            Vision intact, EOM intact     Ears:    Ears appear intact with no abnormalities noted   Neck:   No adenopathy, supple, trachea midline, no thyromegaly   Back:     No kyphosis present, no scoliosis present, no skin lesions,      erythema or scars, no tenderness to palpation,   range of motion normal   Lungs:     Clear, respirations regular, even  and unlabored    Heart:    Regular rhythm and normal rate, no edema   Chest Wall:    No abnormalities observed   Abdomen:     Normal bowel sounds, no masses, no organomegaly, soft        non-tender, non-distended, no guarding   Rectal:     Deferred   Extremities:  Right shoulder is soft with no clinical deformity.  She has minimal discomfort on palpation about the shoulder there is no erythema or increased warmth   Pulses:   Pulses palpable and equal bilaterally   Skin:   No bleeding, bruising or rash   Lymph nodes:   No palpable adenopathy   Neurologic:   Cranial nerves 2 - 12 grossly intact, alert and oriented times 3.         DATA:    Lab Results (last 24 hours)     Procedure Component Value Units Date/Time    Basic Metabolic Panel [[id number]]  (Abnormal) Collected: 07/03/21 0459    Specimen: Blood Updated: 07/03/21 0540     Glucose 122 mg/dL      BUN 30 mg/dL      Creatinine 3.93 mg/dL      Sodium 132 mmol/L      Potassium 4.8 mmol/L      Chloride 91 mmol/L      CO2 28.0 mmol/L      Calcium 9.1 mg/dL      eGFR   Amer --     Comment: <15 Indicative of kidney failure.        eGFR Non African Amer 12 mL/min/1.73      Comment: <15 Indicative of kidney failure.        BUN/Creatinine Ratio 7.6     Anion Gap 13.0 mmol/L     Narrative:      GFR Normal >60  Chronic Kidney Disease <60  Kidney Failure <15      POC Glucose Once [[id number]]  (Normal) Collected: 07/03/21 0521    Specimen: Blood Updated: 07/03/21 0532     Glucose 126 mg/dL      Comment: : [id number] [employer] MelissaStopford [employer]er ID: [id number]       CBC (No Diff) [[id number]]  (Abnormal) Collected: 07/03/21 0459    Specimen: Blood Updated: 07/03/21 0521     WBC 4.80 10*3/mm3      RBC 3.65 10*6/mm3      Hemoglobin 10.8 g/dL      Hematocrit 36.0 %      MCV 98.6 fL      MCH 29.6 pg      MCHC 30.0 g/dL      RDW 17.2 %      RDW-SD 62.3 fl      MPV 9.4 fL      Platelets 198 10*3/mm3     POC Glucose Once [[id number]]  (Normal) Collected: 07/03/21 0021    Specimen:  Blood Updated: 07/03/21 0032     Glucose 129 mg/dL      Comment: : [id number] Jorge Luis Alanis ID: [id number]       POC Glucose Once [[id number]]  (Abnormal) Collected: 07/02/21 1834    Specimen: Blood Updated: 07/02/21 1846     Glucose 237 mg/dL      Comment: : [id number] Dominique Pyle ID: [id number]             Radiology:   Imaging Results (Last 7 Days)     Procedure Component Value Units Date/Time    MRI Brain Without Contrast [[id number]] Collected: 07/02/21 1222     Updated: 07/02/21 1245    Narrative:      EXAM: MRI BRAIN WO CONTRAST- - 7/2/2021 10:59 AM CDT     HISTORY: abnormal ct head, ams; R41.82-Altered mental status,  unspecified; A41.9-Sepsis, unspecified organism; X83-Xsgbiok effusion,  not elsewhere classified; R13.10-Dysphagia, unspecified; Z78.9-Other  specified health status; Z74.09-Other reduced mobility       COMPARISON: 6/30/2012.      TECHNIQUE:   Routine pulse sequences of the brain were obtained without IV contrast.      FINDINGS:   No acute intracranial hemorrhage, midline shift, mass effect, or  restricted diffusion to indicate acute infarct.      Ventricles, sulci and basilar cisterns have proportional prominence  suggesting volume loss. T2/FLAIR hyperintensity in the periventricular  and subcutaneous cortical white matter, most characteristic of chronic  microvascular changes. Old left basal ganglia lacunar infarct. Sella and  midline structures within normal limits. Brainstem and posterior fossa  are within normal limits.     Visualized flow voids within normal limits. Postoperative changes at the  left globe. No mastoid effusion or paranasal sinus fluid.      There is a 4 x 2 cm oval area of fluid intensity at the level of the  left occipital condyle and C1. No associated diffusion restriction.  Limited in evaluation on prior comparison CT due to field-of-view and CT  technique.          Impression:      1. No evidence of acute infarct. Specifically, no abnormality in  the  right medial cerebellum to correlate with prior CT 6/30/2021.  2. There is a 4 x 2 cm area of fluid intensity at the level of the left  occipital condyle and C1 of indeterminate etiology.   3. Mild chronic microvascular changes and moderate volume loss.     This report was finalized on 07/02/2021 12:41 by Dr Carline Mcgovern MD.    CT Head Without Contrast [[id number]] Collected: 06/30/21 1158     Updated: 06/30/21 1203    Narrative:      EXAM: CT HEAD WO CONTRAST- - 6/30/2021 11:52 AM CDT     HISTORY: Altered mental status       COMPARISON: 6/16/2021.      DOSE LENGTH PRODUCT: 1516 mGy cm. Automated exposure control was also  utilized to decrease patient radiation dose.     TECHNIQUE: Unenhanced CT images obtained from vertex to skull base with  multiplanar reformats.     FINDINGS:  New hypodensity at the right medial cerebellum.     Ventricles, basilar cisterns and sulci have proportional prominence  suggesting volume loss. Periventricular and subcortical white matter  hypodensities, most likely due to chronic microvascular disease.     Postoperative changes to the left eye. Calvarium appears intact.  Subcutaneous tissues within normal limits.          Impression:      1. New hypodensity at the right medial cerebellum, could represent a  true abnormality or artifact. Recommend MRI for further evaluation.   2. Similar chronic microvascular changes and volume loss.  This report was finalized on 06/30/2021 12:00 by Dr Carline Mcgovern MD.    XR Shoulder 2+ View Right [[id number]] Collected: 06/30/21 1021     Updated: 06/30/21 1026    Narrative:      Right shoulder, 3 views 6/30/2021 10:03 AM CDT     History: Right shoulder tenderness     Comparison: March 26, 2021      Findings:   Internal rotation, and external rotation views of the right shoulder are  submitted.      There is nonunion of the previously described comminuted fracture the  head and neck of the right humerus. The skeletal structures are not  aligned.  There is been partial resorption of the humeral head. The AC  joint is maintained.. Moderate right pleural effusion is present..        Impression:      Impression:   1. 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        This report was finalized on 06/30/2021 10:22 by Dr. Tony Malik MD.    XR Chest 1 View [[id number]] Collected: 06/30/21 1018     Updated: 06/30/21 1022    Narrative:      EXAM: XR CHEST 1 VW- 6/30/2021 10:03 AM CDT     HISTORY: ams       COMPARISON: June 16, 2021.     TECHNIQUE: Frontal radiograph of the chest     FINDINGS:   Large right lateral pleural effusion is present. This is similar to June 16, 2021. Left lung is clear.. Cardiac silhouettes moderately enlarged..        The osseous structures and surrounding soft tissues demonstrate no acute  abnormality.          Impression:      1. Large right lateral pleural effusion unchanged from June 16, 2021.        This report was finalized on 06/30/2021 10:19 by Dr. Tony Malik MD.          Imaging was brought up and reviewed and I agree with the radiology findings.    IMPRESSION/RECOMMENDATIONS:      Altered mental status    HTN (hypertension)    DM2 (diabetes mellitus, type 2) (CMS/HCC)    Chronic anemia    C. difficile diarrhea    Fever    Elevated troponin    Fx humeral neck, right, with nonunion, subsequent encounter    Pleural effusion on right      Assessment: Fracture of the right proximal humerus sometime ago with what appears to be a nonunion and avascular process of the humeral head    Plan:  1) I feel that observation is in order  2) repeat x-rays of the right shoulder in approximately 4 weeks      Electronically signed by Jeff Springer MD10:44 CDT7/3/2021              Electronically signed by Jeff Springer MD at 07/03/21 1048

## 2021-07-04 NOTE — THERAPY TREATMENT NOTE
Acute Care - Physical Therapy Treatment Note  [facility]     Patient Name: Mini Gentile  : 1960  MRN: [id number]  Today's Date: 2021           PT Assessment (last 12 hours)      PT Evaluation and Treatment     Row Name 21          Physical Therapy Time and Intention    Subjective Information  no complaints  -NAOMI     Document Type  therapy note (daily note)  -NAOMI     Mode of Treatment  physical therapy  -NAOMI     Row Name 21          General Information    Existing Precautions/Restrictions  fall c-diff  -NAOMI     Row Name 21          Pain Scale: Word Pre/Post-Treatment    Pain: Word Scale, Pretreatment  2 - mild pain  -NAOMI     Posttreatment Pain Rating  4 - moderate pain  -NAOMI     Pain Location - Side  Right  -NAOMI     Pain Location  shoulder  -NAOMI     Row Name 21          Bed Mobility    Comment (Bed Mobility)  chair   -NAOMI     Row Name 21          Transfers    Sit-Stand [place] (Transfers)  verbal cues;contact guard  -NAOMI     Row Name 21          Gait/Stairs (Locomotion)    [place] Level (Gait)  verbal cues;contact guard  -NAOMI     Assistive Device (Gait)  walker, front-wheeled  -NAOMI     Distance in Feet (Gait)  100  -NAOMI     Row Name             Wound 21 014 coccyx    Wound - Properties Group Placement Date: 21  -VT Placement Time: 142 Location: coccyx  -VT    Retired Wound - Properties Group Date first assessed: 21  -VT Time first assessed: 142  -VT Location: coccyx  -VT    Row Name 21          Positioning and Restraints    Pre-Treatment Position  sitting in chair/recliner  -NAOMI     Post Treatment Position  chair  -NAOMI     In Chair  sitting;call light within reach;encouraged to call for assist  -NAOMI       User Key  (r) = Recorded By, (t) = Taken By, (c) = Cosigned By    Initials Name Provider Type    Domingo Whitaker PTA Physical Therapy Assistant    VT Alyx Watson, RN Registered Nurse         Physical Therapy Education                 Title: PT OT SLP Therapies (In Progress)     Topic: Physical Therapy (In Progress)     Point: Mobility training (Done)     Learning Progress Summary           Patient Acceptance, E, VU by MS at 7/2/2021 0841    Comment: role of PT in her care                   Point: Home exercise program (Not Started)     Learner Progress:  Not documented in this visit.          Point: Body mechanics (Not Started)     Learner Progress:  Not documented in this visit.          Point: Precautions (Not Started)     Learner Progress:  Not documented in this visit.                      User Key     Initials Effective Dates Name Provider Type Discipline    MS 06/19/18 -  Maia Ovalle R, PT, DPT, NCS Physical Therapist PT              PT Recommendation and Plan     Plan of Care Reviewed With: patient  Progress: improving  Outcome Summary: Pt was sitting up in the chair.  C/o increase right shoulder pain today.  Able to transfer sit to stand with CGA.  Amb 100' with RWX and CGA, occassional assistance to guide walker due to visual deficit.       Time Calculation:   PT Charges     Row Name 07/04/21 0902             Time Calculation    Start Time  0902  -NAOMI      Stop Time  0914  -NAOMI      Time Calculation (min)  12 min  -NAOMI      PT Received On  07/04/21  -NAOMI         Time Calculation- PT    Total Timed Code Minutes- PT  12 minute(s)  -NAOMI         Timed Charges    08560 - Gait Training Minutes   12  -NAOMI         Total Minutes    Timed Charges Total Minutes  12  -NAOMI       Total Minutes  12  -NAOMI        User Key  (r) = Recorded By, (t) = Taken By, (c) = Cosigned By    Initials Name Provider Type    Domingo Whitaker PTA Physical Therapy Assistant        Therapy Charges for Today     Code Description Service Date Service Provider Modifiers Qty    20108267657 HC GAIT TRAINING EA 15 MIN 7/3/2021 Domingo Dill PTA GP 1    [id number] HC GAIT TRAINING EA 15 MIN 7/4/2021 Domingo Dill PTA GP 1           PT G-Codes  Outcome Measure Options: AM-PAC 6 Clicks Basic Mobility (PT)  AM-PAC 6 Clicks Score (PT): 14  AM-PAC 6 Clicks Score (OT): 17    Domingo Dill, PTA  7/4/2021

## 2021-07-04 NOTE — PLAN OF CARE
Goal Outcome Evaluation:      Pt had two liquid bm's tonight, VSS, no urine, tylenol given for ache head and knees, calazime cream massaged in to thighs and knees for extreme itching with good relief, dialysis TTS, continue to monitor

## 2021-07-04 NOTE — PLAN OF CARE
Goal Outcome Evaluation:  Plan of Care Reviewed With: patient        Progress: improving  Outcome Summary: Pt was sitting up in the chair.  C/o increase right shoulder pain today.  Able to transfer sit to stand with CGA.  Amb 100' with RWX and CGA, occassional assistance to guide walker due to visual deficit.

## 2021-07-04 NOTE — PROGRESS NOTES
[facility]  INPATIENT PROGRESS NOTE    Patient Name: Mini Gentile  Date of Admission: 6/30/2021  Today's Date: 07/04/21  Length of Stay: 4  Primary Care Physician: Bharati Dahl APRN    Subjective   Chief Complaint: Follow-up C. Difficile    HPI   Patient is doing well today.  No new issues or complaint.  Stools have improved and are becoming formed.  No fevers.  No belly pain.  Tolerating p.o.    ROS:  All pertinent negatives and positives are as above. All other systems have been reviewed and are negative unless otherwise stated.     Objective    Temp:  [97.9 °F (36.6 °C)-98.3 °F (36.8 °C)] 98 °F (36.7 °C)  Heart Rate:  [60-78] 66  Resp:  [16-18] 18  BP: (134-150)/(56-76) 142/60  Physical Exam  GEN: Awake, alert, interactive, in NAD  HEENT: Anicteric, Trachea midline  Lungs: CTAB, no wheezing/rales/rhonchi  Heart: RRR, +S1/s2, no rub  ABD: soft, nt/nd, +BS, no guarding/rebound  Extremities: atraumatic, no cyanosis  Skin: no rashes or lesions  Neuro: AAOx3, no focal deficits  Psych: normal mood & affect        Results Review:  I have reviewed the labs, radiology results, and diagnostic studies.    Laboratory Data:   Results from last 7 days   Lab Units 07/03/21 0459 07/02/21 0440 07/01/21  0242   WBC 10*3/mm3 4.80 6.40 10.66   HEMOGLOBIN g/dL 10.8* 10.0* 10.1*   HEMATOCRIT % 36.0 33.7* 33.7*   PLATELETS 10*3/mm3 198 188 199        Results from last 7 days   Lab Units 07/03/21 0459 07/02/21 0440 07/01/21  0242 06/30/21  1124   SODIUM mmol/L 132* 136 135* 137   POTASSIUM mmol/L 4.8 4.4 4.8 4.2   CHLORIDE mmol/L 91* 96* 94* 94*   CO2 mmol/L 28.0 30.0* 28.0 29.0   BUN mg/dL 30* 23 31* 26*   CREATININE mg/dL 3.93* 3.24* 4.33* 3.75*   CALCIUM mg/dL 9.1 8.8 8.7 9.0   BILIRUBIN mg/dL  --   --   --  0.9   ALK PHOS U/L  --   --   --  314*   ALT (SGPT) U/L  --   --   --  6   AST (SGOT) U/L  --   --   --  15   GLUCOSE mg/dL 122* 139* 82 210*       Culture Data:     Blood Culture   Date Value Ref Range Status   06/30/2021 No growth at 2 days  Preliminary   06/30/2021 No growth at 2 days  Preliminary       Radiology Data:   Imaging Results (Last 24 Hours)     ** No results found for the last 24 hours. **          I have reviewed the patient's current medications.     Assessment/Plan     Active Hospital Problems    Diagnosis    • **Altered mental status    • Fever    • Elevated troponin    • Fx humeral neck, right, with nonunion, subsequent encounter    • Pleural effusion on right    • C. difficile diarrhea    • Chronic anemia    • DM2 (diabetes mellitus, type 2) (CMS/[facility])    • HTN (hypertension)        Patient is improving.  Mental status has improved.  Afebrile.  Diarrhea improving.    Continue p.o. vancomycin and Florastor.  Plan for 2 weeks of Vanco.  Appreciate ID input.    Ortho evaluating for shoulder but recommends ongoing monitoring and follow-up.    H&H is stable    Continue meds for blood pressure and diabetes    Dialysis as per schedule/nephrology recommendations    Plan for return back to skilled nursing facility when insurance authorization approved.      Discharge Planning: I expect the patient to be discharged to skilled nursing facility when pre-CERT back.  Currently pending.    Electronically signed by Bin Guy DO, 07/04/21, 09:56 CDT.

## 2021-07-04 NOTE — PLAN OF CARE
Goal Outcome Evaluation: Patient is alert and oriented No complaints of pain. MONSALVE, up with help. Patient does not void. BM x 3 today and is more formed. VSS, Has been up in chair most of the day and worked with PT. Contact precautions. VSS

## 2021-07-04 NOTE — PROGRESS NOTES
PROGRESS NOTE.      Patient:  Mini Gentile  YOB: 1960  Date of Service: 7/4/2021  MRN: [id number]   Acct: [id number]   Primary Care Physician: Bharati Dahl APRN  Advance Directive:   Code Status and Medical Interventions:   Ordered at: 06/30/21 1620     Level Of Support Discussed With:    Patient     Code Status:    CPR     Medical Interventions (Level of Support Prior to Arrest):    Full     Admit Date: 6/30/2021       Hospital Day: 4  Referring Provider: No ref. provider found      Patient Seen, Chart, Consults, Notes, Labs, Radiology studies reviewed.      Subjective:  Mini Gentile is a 61 y.o. female  whom we were consulted for ESRD and maintenance HD. Patient dialyzes in  clinic on TTS.  She was recently hospitalized for C.Diff and pseudomonal bacteremia.  She was discharged back to SNF on 6/24/21.  She then developed a fever of 102, diarrhea, ams, and right shoulder pain (has a known history of right shoulder fracture). Patient was first admitted to CCU but was then transferred to the floor .  Patient's main complaint was diarrhea.  She is status post dialysis on 7/3.  Patient is doing better awaiting return to her nursing home.    Allergies:  Bupropion, Chantix [varenicline], Gabapentin, Azithromycin, Levofloxacin, Penicillins, and Sulfa antibiotics    Home Meds:  Medications Prior to Admission   Medication Sig Dispense Refill Last Dose   • aspirin 81 MG chewable tablet Chew 81 mg Daily.   6/29/2021 at Unknown time   • atropine 1 % ophthalmic solution Administer 1 drop into the left eye Daily.   6/29/2021 at Unknown time   • brimonidine (ALPHAGAN) 0.2 % ophthalmic solution Administer 1 drop into the left eye 2 (two) times a day.   6/29/2021 at Unknown time   • carvedilol (COREG) 6.25 MG tablet Take 6.25 mg by mouth 2 (Two) Times a Day With Meals.   6/29/2021 at Unknown time   • cholecalciferol (VITAMIN D3) 25 MCG (1000 UT) tablet Take 2,000 Units by mouth Daily.   6/29/2021 at  Unknown time   • dorzolamide (TRUSOPT) 2 % ophthalmic solution Administer 1 drop into the left eye 2 (Two) Times a Day.   2021 at Unknown time   • famotidine (PEPCID) 20 MG tablet Take 1 tablet by mouth Daily.   2021 at Unknown time   • Ferric Citrate 1  MG(Fe) tablet Take 1 tablet by mouth 3 (Three) Times a Day With Meals.   2021 at Unknown time   • fluticasone (FLONASE) 50 MCG/ACT nasal spray 2 sprays into the nostril(s) as directed by provider 2 (Two) Times a Day.   2021 at Unknown time   • lactobacillus acidophilus (RISAQUAD) capsule capsule Take 1 capsule by mouth Daily. 30 capsule 2 2021 at Unknown time   • ondansetron (ZOFRAN) 4 MG tablet Take 1 tablet by mouth Every 6 (Six) Hours As Needed for Nausea or Vomiting. 24 tablet 2 2021 at Unknown time   • sertraline (ZOLOFT) 25 MG tablet Take 1 tablet by mouth Daily.   2021 at Unknown time   • sevelamer (RENAGEL) 800 MG tablet Take 800 mg by mouth 3 (Three) Times a Day With Meals.   2021 at Unknown time   • timolol (TIMOPTIC) 0.5 % ophthalmic solution Administer 1 drop into the left eye Every Morning.  12 2021 at Unknown time   • acetaminophen (TYLENOL) 325 MG tablet Take 650 mg by mouth Every 4 (Four) Hours As Needed for Mild Pain  or Fever.   Unknown at Unknown time   • ipratropium-albuterol (DUO-NEB) 0.5-2.5 mg/3 ml nebulizer Take 3 mL by nebulization Every 6 (Six) Hours As Needed for Wheezing or Shortness of Air.   Unknown at Unknown time   • [] vancomycin 50 MG/ML reconstituted solution oral solution reconstituted Take 2.5 mL by mouth Every 6 (Six) Hours for 18 doses. Indications: Clostridium Difficile Infection 45 mL 0        Medicines:  Current Facility-Administered Medications   Medication Dose Route Frequency Provider Last Rate Last Admin   • acetaminophen (TYLENOL) tablet 650 mg  650 mg Oral Q4H PRN Sarahy Mike APRN   650 mg at 21 0827    Or   • acetaminophen (TYLENOL) suppository  650 mg  650 mg Rectal Q4H PRN Sarahy Mike, APRN       • aspirin EC tablet 81 mg  81 mg Oral Daily Sarahy Mike APRN   81 mg at 07/04/21 0811   • atropine 1 % ophthalmic solution 1 drop  1 drop Left Eye Daily Sarahy Mike, APRN   1 drop at 07/04/21 0817   • brimonidine (ALPHAGAN) 0.2 % ophthalmic solution 1 drop  1 drop Left Eye BID Sarahy Mike APRN   1 drop at 07/04/21 0816   • carvedilol (COREG) tablet 6.25 mg  6.25 mg Oral BID With Meals Sarahy Mike APRN   6.25 mg at 07/04/21 0811   • dextrose (D50W) 25 g/ 50mL Intravenous Solution 25 g  25 g Intravenous Q15 Min PRN Sarahy Mike, APRN       • dextrose (GLUTOSE) oral gel 15 g  15 g Oral Q15 Min PRN Sarahy Mike, APRN       • famotidine (PEPCID) tablet 20 mg  20 mg Oral Daily Sarahy Mike APRN   20 mg at 07/04/21 0811   • glucagon (human recombinant) (GLUCAGEN DIAGNOSTIC) injection 1 mg  1 mg Subcutaneous Q15 Min PRN Sarahy Mike APRN       • heparin (porcine) 5000 UNIT/ML injection 5,000 Units  5,000 Units Subcutaneous Q12H Sarahy Mike APRN   5,000 Units at 07/04/21 0813   • insulin regular (humuLIN R,novoLIN R) injection 2-7 Units  2-7 Units Subcutaneous TID With Meals Bin Guy DO   2 Units at 07/04/21 1126   • nicotine (NICODERM CQ) 21 MG/24HR patch 1 patch  1 patch Transdermal Q24H Sundeep Aldridge MD   1 patch at 07/03/21 2221   • ondansetron (ZOFRAN) injection 4 mg  4 mg Intravenous Q6H PRN Sarahy Mike, APRN       • pravastatin (PRAVACHOL) tablet 10 mg  10 mg Oral Nightly Sarahy Mike APRN   10 mg at 07/03/21 2220   • saccharomyces boulardii (FLORASTOR) capsule 500 mg  500 mg Oral BID Sarahy Mike, APRN   500 mg at 07/04/21 0811   • sertraline (ZOLOFT) tablet 25 mg  25 mg Oral Daily Sarahy Mike APRN   25 mg at 07/04/21 0811   • sodium chloride 0.9 % flush 10 mL  10 mL Intravenous PRN Sarahy Mike APRN       • sodium chloride 0.9 % flush 10  mL  10 mL Intravenous Q12H WoeltSamantha montgomerySarahy K, APRN   10 mL at 21 0811   • sodium chloride 0.9 % flush 10 mL  10 mL Intravenous PRN WoeltSamantha montgomerySarahy K, APRN       • timolol (TIMOPTIC) 0.5 % ophthalmic solution 1 drop  1 drop Left Eye QAM Samantha Mikericia K, APRN   1 drop at 21 0609   • vancomycin oral solution 125 mg  125 mg Oral Q6H WoeltSamantha montgomerySarahy K, APRN   125 mg at 21 1222       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    • Chronic otitis media    • Diabetes (CMS/[facility])    • End stage renal disease on dialysis (CMS/[facility])    • Eustachian tube dysfunction    • GERD (gastroesophageal reflux disease)    • Glaucoma    • HTN (hypertension)    • Hyperlipidemia    • Mucoid otitis media    • Noncompliance of patient with renal dialysis (CMS/[facility])    • Tobacco abuse        Past Surgical History:  Past Surgical History:   Procedure Laterality Date   • ARTERIOVENOUS FISTULA     • CATARACT EXTRACTION WITH INTRAOCULAR LENS IMPLANT     •  SECTION     • CHOLECYSTECTOMY     • MYRINGOTOMY W/ TUBES Bilateral    • MYRINGOTOMY W/ TUBES Right    • TUBAL ABDOMINAL LIGATION         Family History  Family History   Problem Relation Age of Onset   • Heart disease Mother    • Diabetes Father    • Colon cancer Neg Hx    • Colon polyps Neg Hx        Social History  Social History     Socioeconomic History   • Marital status: Single     Spouse name: Not on file   • Number of children: Not on file   • Years of education: Not on file   • Highest education level: Not on file   Tobacco Use   • Smoking status: Current Every Day Smoker     Packs/day: 2.00     Years: 6.00     Pack years: 12.00     Types: Cigarettes   • Smokeless tobacco: Never Used   Vaping Use   • Vaping Use: Never used   Substance and Sexual Activity   • Alcohol use: No   • Drug use: No   • Sexual activity: Defer       Review of Systems:  History obtained from chart review and the  "patient  General ROS: No fever or chills  Respiratory ROS: No cough, shortness of breath, wheezing  Cardiovascular ROS: no chest pain or dyspnea on exertion  Gastrointestinal ROS: No abdominal pain or melena  Genito-Urinary ROS: No dysuria or hematuria  Musculoskeletal: negative  Skin: negative    Objective:  /66 (BP Location: Right arm, Patient Position: Sitting)   Pulse 71   Temp 98.6 °F (37 °C) (Oral)   Resp 18   Ht 147.3 cm (58\")   Wt 63.9 kg (140 lb 14 oz)   SpO2 94%   BMI 29.44 kg/m²     Intake/Output Summary (Last 24 hours) at 7/4/2021 1622  Last data filed at 7/3/2021 2221  Gross per 24 hour   Intake 240 ml   Output --   Net 240 ml       Physical examination:  General: awake/alert   Chest:  clear to auscultation bilaterally without respiratory distress  CVS: regular rate and rhythm  Abdominal: soft, nontender, normal bowel sounds  Extremities: no cyanosis or edema  Skin: warm and dry without rash  Neuro: No focal motor deficits    Labs:  Lab Results (last 24 hours)     Procedure Component Value Units Date/Time    POC Glucose Once [[id number]]  (Abnormal) Collected: 07/04/21 1057    Specimen: Blood Updated: 07/04/21 1117     Glucose 164 mg/dL      Comment: : [id number] Ro Haji ID: [id number]       Blood Culture - Blood, Wrist, Right [[id number]] Collected: 06/30/21 1030    Specimen: Blood from Wrist, Right Updated: 07/04/21 1100     Blood Culture No growth at 4 days    Blood Culture - Blood, Hand, Left [[id number]] Collected: 06/30/21 1032    Specimen: Blood from Hand, Left Updated: 07/04/21 1100     Blood Culture No growth at 4 days    POC Glucose Once [[id number]]  (Abnormal) Collected: 07/04/21 0809    Specimen: Blood Updated: 07/04/21 0830     Glucose 210 mg/dL      Comment: : [id number] Ro RoweMettoro ID: [id number]       POC Glucose Once [[id number]]  (Abnormal) Collected: 07/03/21 2312    Specimen: Blood Updated: 07/03/21 2324     Glucose 169 mg/dL      Comment: : " [id number] Concetta CrystalMeter ID: [id number]             Radiology:   Imaging Results (Last 24 Hours)     ** No results found for the last 24 hours. **              Assessment   -ESRD maintenance HD on TTS ([facility])  -C.Diff diarrhea  -AMS  -Fever  -Right pleural effusion  -DM2  -HTN  -Anemia of CKD    Plan:  Dialysis is due next on 7/6.  Follow-up labs.      Ten Boyd MD  7/4/2021  16:22 CDT

## 2021-07-05 NOTE — PLAN OF CARE
Goal Outcome Evaluation:  Plan of Care Reviewed With: patient        Progress: improving  Outcome Summary: Pt a&o but can be forgetful at times. Up in chair most of day. Shower today with therapy. BM x2 so far this shift. BG monitored per orders. Tele on. Safety maintained. Contact precautions in place.

## 2021-07-05 NOTE — THERAPY TREATMENT NOTE
Acute Care - Occupational Therapy Treatment Note  [facility]     Patient Name: Mini Gentile  : 1960  MRN: [id number]  Today's Date: 2021             Admit Date: 2021       ICD-10-CM ICD-9-CM   1. Altered mental status, unspecified altered mental status type  R41.82 780.97   2. Sepsis, due to unspecified organism, unspecified whether acute organ dysfunction present (CMS/[facility])  A41.9 038.9     995.91   3. Pleural effusion  J90 511.9   4. Dysphagia, unspecified type  R13.10 787.20   5. Decreased activities of daily living (ADL)  Z78.9 V49.89   6. 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   • Eustachian tube dysfunction   • Chronic otitis media   • Pneumonia of left upper lobe due to Streptococcus (CMS/[facility])   • End stage renal failure on dialysis (CMS/[facility])   • Diabetes (CMS/[facility])   • Glaucoma   • HTN (hypertension)   • Proteinuria   • DM2 (diabetes mellitus, type 2) (CMS/[facility])   • Chronic anemia   • Acute pulmonary edema (CMS/[facility])   • Non-compliance with renal dialysis (CMS/[facility])   • Elevated troponin due to ESRD    • Hyperkalemia   • Encephalopathy, metabolic, due to uremia   • High anion gap metabolic acidosis due to uremia   • Respiratory distress   • Acute diastolic heart failure (CMS/[facility])   • Lymph node enlargement, left axillary measuring 1.1 cm -follow-up for primary care   • Nausea vomiting and diarrhea   • Acute diastolic heart failure (CMS/[facility])   • Diabetes mellitus with ophthalmic complication (CMS/[facility])   • Tobacco abuse   • Urinary tract infection with hematuria   • Pneumonia due to infectious organism   • Abnormal urine findings   • Closed fracture of right proximal humerus   • Hyponatremia   • Hypothyroidism (acquired)   • Forehead laceration secondary to fall   • Heme positive stool   • Dialysis AV fistula malfunction, initial encounter (CMS/[facility])   • Aspiration into airway   • Bronchospasm, acute   •  Altered mental status   • Protein-calorie malnutrition (CMS/HCC)   • Pupil irregular   • Sepsis (CMS/HCC)   • Hypoglycemia   • Hyponatremia   • Diarrhea   • Hypokalemia   • C. 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    • Chronic otitis media    • Diabetes (CMS/HCC)    • End stage renal disease on dialysis (CMS/[facility])    • Eustachian tube dysfunction    • GERD (gastroesophageal reflux disease)    • Glaucoma    • HTN (hypertension)    • Hyperlipidemia    • Mucoid otitis media    • Noncompliance of patient with renal dialysis (CMS/[facility])    • Tobacco abuse      Past Surgical History:   Procedure Laterality Date   • ARTERIOVENOUS FISTULA     • CATARACT EXTRACTION WITH INTRAOCULAR LENS IMPLANT     •  SECTION     • CHOLECYSTECTOMY     • MYRINGOTOMY W/ TUBES Bilateral    • MYRINGOTOMY W/ TUBES Right    • TUBAL ABDOMINAL LIGATION              OT ASSESSMENT FLOWSHEET (last 12 hours)      OT Evaluation and Treatment     Row Name 21 1106                   OT Time and Intention    Subjective Information  complains of;weakness;fatigue  -TS        Document Type  therapy note (daily note)  -TS        Mode of Treatment  occupational therapy  -TS        Patient Effort  good  -TS           General Information    Existing Precautions/Restrictions  fall  -TS           Cognition    Personal Safety Interventions  fall prevention program maintained;nonskid shoes/slippers when out of bed;gait belt  -TS           Pain Scale: Word Pre/Post-Treatment    Pain: Word Scale, Pretreatment  0 - no pain  -TS        Posttreatment Pain Rating  0 - no pain  -TS           Functional Mobility    Functional Mobility- Ind. Level  standby assist  -TS        Functional Mobility- Device  rolling walker  -TS         Functional Mobility- Comment  in room, in BR  -TS           Transfer Assessment/Treatment    Transfers  sit-stand transfer;stand-sit transfer;toilet transfer;shower transfer  -TS           Transfers    Sit-Stand [place] (Transfers)  standby assist  -TS        Stand-Sit [place] (Transfers)  standby assist  -TS        [place] Level (Toilet Transfer)  supervision  -TS        Assistive Device (Toilet Transfer)  commode;grab bars/safety frame  -TS        [place] Level (Shower Transfer)  supervision  -TS        Assistive Device (Shower Transfer)  grab bar, tub/shower;shower chair  -TS           Sit-Stand Transfer    Assistive Device (Sit-Stand Transfers)  walker, front-wheeled  -TS           Stand-Sit Transfer    Assistive Device (Stand-Sit Transfers)  walker, front-wheeled  -TS           Toilet Transfer    Type (Toilet Transfer)  sit-stand;stand-sit  -TS           Shower Transfer    Type (Shower Transfer)  sit-stand;stand-sit  -TS           Activities of Daily Living    BADL Assessment/Intervention  upper body dressing;lower body dressing;bathing;toileting;grooming  -TS           Bathing Assessment/Intervention    [place] Level (Bathing)  upper body;lower body;set up;supervision  -TS        Assistive Devices (Bathing)  grab bar, tub/shower;hand-held shower spray hose;shower chair  -TS        Position (Bathing)  unsupported sitting;unsupported standing  -TS           Upper Body Dressing Assessment/Training    [place] Level (Upper Body Dressing)  don;doff;moderate assist (50% patient effort)  -TS        Position (Upper Body Dressing)  unsupported sitting  -TS           Lower Body Dressing Assessment/Training    [place] Level (Lower Body Dressing)  don;doff;pants/bottoms;socks;set up;moderate assist (50% patient effort)  -TS        Position (Lower Body Dressing)  unsupported sitting;unsupported standing  -TS           Grooming Assessment/Training    [place] Level (Grooming)  hair  care, combing/brushing;maximum assist (25% patient effort)  -TS        Position (Grooming)  unsupported sitting  -TS           Toileting Assessment/Training    [place] Level (Toileting)  toileting skills;supervision;adjust/manage clothing;minimum assist (75% patient effort);perform perineal hygiene;maximum assist (25% patient effort)  -TS        Position (Toileting)  unsupported sitting;unsupported standing  -TS           Wound 04/04/21 0142 coccyx    Wound - Properties Group Placement Date: 04/04/21 -VT Placement Time: 0142 -VT Location: coccyx  -VT    Retired Wound - Properties Group Date first assessed: 04/04/21 -VT Time first assessed: 0142 -VT Location: coccyx  -VT       Plan of Care Review    Plan of Care Reviewed With  patient  -TS        Progress  improving  -TS        Outcome Summary  Pt ambulated to/from and in BR this AM. Pt completed TB bathing task with S and set up with assist of shower chair and HH shower head. Pt min A for clothing management and max A for hygiene with toileting. Pt min/mod A for UB dressing, mod A for LB dressing. Pt is limited by decreased ROM in R shoulder from fall which resulted in fx per pt. Per pt she has not recieved sx to address breaks from fall in R shoulder that happened prior to hospital stay. Pt would benefit from rehab at discharge. Continue OT POC  -TS           Positioning and Restraints    Pre-Treatment Position  sitting in chair/recliner  -TS        Post Treatment Position  chair  -TS        In Chair  sitting;encouraged to call for assist;call light within reach  -TS          User Key  (r) = Recorded By, (t) = Taken By, (c) = Cosigned By    Initials Name Effective Dates    TS Bijal Lindquist COTA/L 06/16/21 -     VT Alyx Watson RN 08/02/16 - 06/15/21         Occupational Therapy Education                 Title: PT OT SLP Therapies (In Progress)     Topic: Occupational Therapy (Done)     Point: ADL training (Done)     Description:   Instruct  learner(s) on proper safety adaptation and remediation techniques during self care or transfers.   Instruct in proper use of assistive devices.              Learning Progress Summary           Patient Acceptance, E,D, VU,NR by  at 7/2/2021 0835                   Point: Home exercise program (Done)     Description:   Instruct learner(s) on appropriate technique for monitoring, assisting and/or progressing therapeutic exercises/activities.              Learning Progress Summary           Patient Acceptance, E,D, VU,NR by  at 7/2/2021 0835                               User Key     Initials Effective Dates Name Provider Type Discipline     08/28/18 -  Sarai Shabazz, OTR/L Occupational Therapist OT                  OT Recommendation and Plan     Plan of Care Review  Plan of Care Reviewed With: patient  Progress: improving  Outcome Summary: Pt ambulated to/from and in BR this AM. Pt completed TB bathing task with S and set up with assist of shower chair and HH shower head. Pt min A for clothing management and max A for hygiene with toileting. Pt min/mod A for UB dressing, mod A for LB dressing. Pt is limited by decreased ROM in R shoulder from fall which resulted in fx per pt. Per pt she has not recieved sx to address breaks from fall in R shoulder that happened prior to hospital stay. Pt would benefit from rehab at discharge. Continue OT POC  Plan of Care Reviewed With: patient  Outcome Summary: Pt ambulated to/from and in BR this AM. Pt completed TB bathing task with S and set up with assist of shower chair and HH shower head. Pt min A for clothing management and max A for hygiene with toileting. Pt min/mod A for UB dressing, mod A for LB dressing. Pt is limited by decreased ROM in R shoulder from fall which resulted in fx per pt. Per pt she has not recieved sx to address breaks from fall in R shoulder that happened prior to hospital stay. Pt would benefit from rehab at discharge. Continue OT POC    Outcome  Measures     Row Name 07/05/21 1300             How much help from another is currently needed...    Putting on and taking off regular lower body clothing?  2  -TS      Bathing (including washing, rinsing, and drying)  3  -TS      Toileting (which includes using toilet bed pan or urinal)  3  -TS      Putting on and taking off regular upper body clothing  3  -TS      Taking care of personal grooming (such as brushing teeth)  3  -TS      Eating meals  3  -TS      AM-PAC 6 Clicks Score (OT)  17  -TS        User Key  (r) = Recorded By, (t) = Taken By, (c) = Cosigned By    Initials Name Provider Type    TS Bijal Lindquist COTA/L Occupational Therapy Assistant          Time Calculation:   Time Calculation- OT     Row Name 07/05/21 1335 07/05/21 0924          Time Calculation- OT    OT Start Time  1106  -TS  --     OT Stop Time  1200  -TS  --     OT Time Calculation (min)  54 min  -TS  --     Total Timed Code Minutes- OT  54 minute(s)  -TS  --     OT Received On  07/05/21  -TS  --        Timed Charges    42088 - Gait Training Minutes   --  24  -NAOMI     06666 - OT Self Care/Mgmt Minutes  54  -TS  --        Total Minutes    Timed Charges Total Minutes  54  -TS  24  -NAOMI      Total Minutes  54  -TS  24  -NAOMI       User Key  (r) = Recorded By, (t) = Taken By, (c) = Cosigned By    Initials Name Provider Type    TS Bijal Lindquist COTA/L Occupational Therapy Assistant    Domingo Whitaker, PTA Physical Therapy Assistant        Therapy Charges for Today     Code Description Service Date Service Provider Modifiers Qty    17389951489 HC OT SELF CARE/MGMT/TRAIN EA 15 MIN 7/5/2021 Bijal Lindquist COTA/L GO 4               Bijal SHEN. SEFERINO Lindquist/GABY  7/5/2021

## 2021-07-05 NOTE — THERAPY TREATMENT NOTE
Acute Care - Physical Therapy Treatment Note  [facility]     Patient Name: Mini Gentile  : 1960  MRN: [id number]  Today's Date: 2021           PT Assessment (last 12 hours)      PT Evaluation and Treatment     Row Name 21          Physical Therapy Time and Intention    Subjective Information  no complaints  -NAOMI     Document Type  therapy note (daily note)  -NAOMI     Mode of Treatment  physical therapy  -     Row Name 21          General Information    Existing Precautions/Restrictions  fall c-diff  -     Row Name 21          Pain Scale: Word Pre/Post-Treatment    Pain: Word Scale, Pretreatment  2 - mild pain  -NAOMI     Posttreatment Pain Rating  2 - mild pain  -NAOMI     Pain Location - Side  Right  -NAOMI     Pain Location - Orientation  upper  -NAOMI     Pain Location  extremity;shoulder  -     Pain Intervention(s)  Repositioned  -     Row Name 21          Bed Mobility    Comment (Bed Mobility)  chair   -[facility] Name 21          Transfers    Transfers  toilet transfer  -     Sit-Stand [place] (Transfers)  verbal cues;contact guard  -     [place] Level (Toilet Transfer)  verbal cues;contact guard;minimum assist (75% patient effort)  -     Assistive Device (Toilet Transfer)  commode;grab bars/safety frame  -     Row Name 21          Toilet Transfer    Type (Toilet Transfer)  sit-stand;stand-sit  -[facility] Name 21          Gait/Stairs (Locomotion)    [place] Level (Gait)  verbal cues;contact guard  -     Assistive Device (Gait)  walker, front-wheeled  -     Distance in Feet (Gait)  100  -     Row Name 21          Motor Skills    Therapeutic Exercise  aerobic  -     Row Name 21          Aerobic Exercise    Comment, Aerobic Exercise (Therapeutic Exercise)  AROM BLE X 20 sitting in the chair   -NAOMI     Row Name             Wound 21 0142 coccyx    Wound - Properties  Group Placement Date: 04/04/21  -VT Placement Time: 0142 -VT Location: coccyx  -VT    Retired Wound - Properties Group Date first assessed: 04/04/21  -VT Time first assessed: 0142 -VT Location: coccyx  -VT    Row Name 07/05/21 0924          Positioning and Restraints    Pre-Treatment Position  sitting in chair/recliner  -NAOMI     Post Treatment Position  chair  -NAOMI     In Chair  reclined;call light within reach;encouraged to call for assist  -NAOMI       User Key  (r) = Recorded By, (t) = Taken By, (c) = Cosigned By    Initials Name Provider Type    NAOMI Domingo Dill PTA Physical Therapy Assistant    VT Alyx Watson RN Registered Nurse        Physical Therapy Education                 Title: PT OT SLP Therapies (In Progress)     Topic: Physical Therapy (In Progress)     Point: Mobility training (Done)     Learning Progress Summary           Patient Acceptance, E, VU by NAOMI at 7/5/2021 0924    Comment: safety with transfers and amb    Acceptance, E, VU by MS at 7/2/2021 0841    Comment: role of PT in her care                   Point: Home exercise program (Not Started)     Learner Progress:  Not documented in this visit.          Point: Body mechanics (Not Started)     Learner Progress:  Not documented in this visit.          Point: Precautions (Not Started)     Learner Progress:  Not documented in this visit.                      User Key     Initials Effective Dates Name Provider Type Discipline    MS 06/19/18 -  Maia Ovalle, PT, DPT, NCS Physical Therapist PT     12/08/16 -  Domingo Dill PTA Physical Therapy Assistant PT              PT Recommendation and Plan     Plan of Care Reviewed With: patient  Progress: improving  Outcome Summary: Pt was sitting in the chair.  Transfered sit to stand with CGA.  Amb 100' with RWX and CGA, min assist to guide walker due to visual deficit.       Time Calculation:   PT Charges     Row Name 07/05/21 0924             Time Calculation    Start Time  0924  -NAOMI       Stop Time  0948  -NAOMI      Time Calculation (min)  24 min  -NAOMI      PT Received On  07/05/21  -NAOMI         Time Calculation- PT    Total Timed Code Minutes- PT  24 minute(s)  -NAOMI         Timed Charges    62786 - Gait Training Minutes   24  -NAOMI         Total Minutes    Timed Charges Total Minutes  24  -NAOMI       Total Minutes  24  -NAOMI        User Key  (r) = Recorded By, (t) = Taken By, (c) = Cosigned By    Initials Name Provider Type    Domingo Whitaker PTA Physical Therapy Assistant        Therapy Charges for Today     Code Description Service Date Service Provider Modifiers Qty    00687701922 HC GAIT TRAINING EA 15 MIN 7/4/2021 Domingo Dill, DAWN GP 1    76178287851 HC GAIT TRAINING EA 15 MIN 7/5/2021 Domingo Dill, DAWN GP 2          PT G-Codes  Outcome Measure Options: AM-PAC 6 Clicks Basic Mobility (PT)  AM-PAC 6 Clicks Score (PT): 14  AM-PAC 6 Clicks Score (OT): 17    Domingo Dill PTA  7/5/2021

## 2021-07-05 NOTE — PLAN OF CARE
Goal Outcome Evaluation:  Plan of Care Reviewed With: patient        Progress: improving  Outcome Summary: Pt was sitting in the chair.  Transfered sit to stand with CGA.  Amb 100' with RWX and CGA, min assist to guide walker due to visual deficit.

## 2021-07-05 NOTE — PLAN OF CARE
Goal Outcome Evaluation:  Plan of Care Reviewed With: patient        Progress: improving  Outcome Summary: Pt ambulated to/from and in BR this AM. Pt completed TB bathing task with S and set up with assist of shower chair and  shower head. Pt min A for clothing management and max A for hygiene with toileting. Pt min/mod A for UB dressing, mod A for LB dressing. Pt is limited by decreased ROM in R shoulder from fall which resulted in fx per pt. Per pt she has not recieved sx to address breaks from fall in R shoulder that happened prior to hospital stay. Pt would benefit from rehab at discharge. Continue OT POC

## 2021-07-06 NOTE — PROGRESS NOTES
Nephrology ([facility]) Progress Note      Patient:  Mini Gentile  YOB: 1960  Date of Service: 7/5/2021  MRN: [id number]   Acct: [id number]   Primary Care Physician: Bharati Dahl APRN  Advance Directive:   Code Status and Medical Interventions:   Ordered at: 06/30/21 1620     Level Of Support Discussed With:    Patient     Code Status:    CPR     Medical Interventions (Level of Support Prior to Arrest):    Full     Admit Date: 6/30/2021       Hospital Day: 5  Referring Provider: No ref. provider found      Patient personally seen and examined.  Complete chart including Consults, Notes, Operative Reports, Labs, Cardiology, and Radiology studies reviewed as able.        Subjective:  Mini Gentile is a 61 y.o. female  whom we were consulted for ESRD and maintenance HD. Patient dialyzes in SS clinic on TTS.  She was recently hospitalized for C.Diff and pseudomonal bacteremia.  She was discharged back to SNF on 6/24/21.  She then developed a fever of 102, diarrhea, ams, and right shoulder pain (has a known history of right shoulder fracture). Patient was first admitted to CCU but was then transferred to the floor .  Patient's main complaint was diarrhea.     Today, no overnight events.  Patient up in chair.  Denies chest pain, shortness of air at rest, nausea or vomiting.    Allergies:  Bupropion, Chantix [varenicline], Gabapentin, Azithromycin, Levofloxacin, Penicillins, and Sulfa antibiotics    Home Meds:  Medications Prior to Admission   Medication Sig Dispense Refill Last Dose   • aspirin 81 MG chewable tablet Chew 81 mg Daily.   6/29/2021 at Unknown time   • atropine 1 % ophthalmic solution Administer 1 drop into the left eye Daily.   6/29/2021 at Unknown time   • brimonidine (ALPHAGAN) 0.2 % ophthalmic solution Administer 1 drop into the left eye 2 (two) times a day.   6/29/2021 at Unknown time   • carvedilol (COREG) 6.25 MG tablet Take 6.25 mg by mouth 2 (Two)  Times a Day With Meals.   2021 at Unknown time   • cholecalciferol (VITAMIN D3) 25 MCG (1000 UT) tablet Take 2,000 Units by mouth Daily.   2021 at Unknown time   • dorzolamide (TRUSOPT) 2 % ophthalmic solution Administer 1 drop into the left eye 2 (Two) Times a Day.   2021 at Unknown time   • famotidine (PEPCID) 20 MG tablet Take 1 tablet by mouth Daily.   2021 at Unknown time   • Ferric Citrate 1  MG(Fe) tablet Take 1 tablet by mouth 3 (Three) Times a Day With Meals.   2021 at Unknown time   • fluticasone (FLONASE) 50 MCG/ACT nasal spray 2 sprays into the nostril(s) as directed by provider 2 (Two) Times a Day.   2021 at Unknown time   • lactobacillus acidophilus (RISAQUAD) capsule capsule Take 1 capsule by mouth Daily. 30 capsule 2 2021 at Unknown time   • ondansetron (ZOFRAN) 4 MG tablet Take 1 tablet by mouth Every 6 (Six) Hours As Needed for Nausea or Vomiting. 24 tablet 2 2021 at Unknown time   • sertraline (ZOLOFT) 25 MG tablet Take 1 tablet by mouth Daily.   2021 at Unknown time   • sevelamer (RENAGEL) 800 MG tablet Take 800 mg by mouth 3 (Three) Times a Day With Meals.   2021 at Unknown time   • timolol (TIMOPTIC) 0.5 % ophthalmic solution Administer 1 drop into the left eye Every Morning.  12 2021 at Unknown time   • acetaminophen (TYLENOL) 325 MG tablet Take 650 mg by mouth Every 4 (Four) Hours As Needed for Mild Pain  or Fever.   Unknown at Unknown time   • ipratropium-albuterol (DUO-NEB) 0.5-2.5 mg/3 ml nebulizer Take 3 mL by nebulization Every 6 (Six) Hours As Needed for Wheezing or Shortness of Air.   Unknown at Unknown time   • [] vancomycin 50 MG/ML reconstituted solution oral solution reconstituted Take 2.5 mL by mouth Every 6 (Six) Hours for 18 doses. Indications: Clostridium Difficile Infection 45 mL 0        Medicines:  Current Facility-Administered Medications   Medication Dose Route Frequency Provider Last Rate Last Admin   •  acetaminophen (TYLENOL) tablet 650 mg  650 mg Oral Q4H PRN Sarahy Mike, APRN   650 mg at 07/04/21 0827    Or   • acetaminophen (TYLENOL) suppository 650 mg  650 mg Rectal Q4H PRN Sarahy Mike, APRN       • aspirin EC tablet 81 mg  81 mg Oral Daily Sarahy Mike APRN   81 mg at 07/05/21 0829   • atropine 1 % ophthalmic solution 1 drop  1 drop Left Eye Daily Sarahy Mike APRN   1 drop at 07/05/21 0828   • brimonidine (ALPHAGAN) 0.2 % ophthalmic solution 1 drop  1 drop Left Eye BID Sarahy Mike APRN   1 drop at 07/05/21 0830   • carvedilol (COREG) tablet 6.25 mg  6.25 mg Oral BID With Meals Sarahy Mike APRN   6.25 mg at 07/05/21 1753   • dextrose (D50W) 25 g/ 50mL Intravenous Solution 25 g  25 g Intravenous Q15 Min PRN Sarahy Mike APRN       • dextrose (GLUTOSE) oral gel 15 g  15 g Oral Q15 Min PRN Sarahy Mike APRN       • famotidine (PEPCID) tablet 20 mg  20 mg Oral Daily Sarahy Mike APRN   20 mg at 07/05/21 0829   • glucagon (human recombinant) (GLUCAGEN DIAGNOSTIC) injection 1 mg  1 mg Subcutaneous Q15 Min PRN Sarahy Mike APRN       • heparin (porcine) 5000 UNIT/ML injection 5,000 Units  5,000 Units Subcutaneous Q12H WoSarahy carmen APRN   5,000 Units at 07/05/21 0828   • insulin regular (humuLIN R,novoLIN R) injection 2-7 Units  2-7 Units Subcutaneous TID With Meals Bin Guy DO   3 Units at 07/05/21 1204   • nicotine (NICODERM CQ) 21 MG/24HR patch 1 patch  1 patch Transdermal Q24H Sundeep Aldridge MD   1 patch at 07/04/21 2109   • ondansetron (ZOFRAN) injection 4 mg  4 mg Intravenous Q6H PRN Sarahy Mike, ANTHONY       • pravastatin (PRAVACHOL) tablet 10 mg  10 mg Oral Nightly Sarahy Mike APRN   10 mg at 07/04/21 2110   • saccharomyces boulardii (FLORASTOR) capsule 500 mg  500 mg Oral BID Sarahy Mike APRN   500 mg at 07/05/21 0828   • sertraline (ZOLOFT) tablet 25 mg  25 mg Oral Daily Sarahy Mike  K, APRN   25 mg at 21 0829   • sodium chloride 0.9 % flush 10 mL  10 mL Intravenous PRN Woeltz, Sarahy K, APRN       • sodium chloride 0.9 % flush 10 mL  10 mL Intravenous Q12H Woeltz, Sarahy K, APRN   10 mL at 21 0829   • sodium chloride 0.9 % flush 10 mL  10 mL Intravenous PRN Woeltz, Sarahy K, APRN       • timolol (TIMOPTIC) 0.5 % ophthalmic solution 1 drop  1 drop Left Eye QAM Woeltz, Sarahy K, APRN   1 drop at 21 0828   • vancomycin oral solution 125 mg  125 mg Oral Q6H Woeltz, Sarahy K, APRN   125 mg at 21 1753       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    • Chronic otitis media    • Diabetes (CMS/[facility])    • End stage renal disease on dialysis (CMS/[facility])    • Eustachian tube dysfunction    • GERD (gastroesophageal reflux disease)    • Glaucoma    • HTN (hypertension)    • Hyperlipidemia    • Mucoid otitis media    • Noncompliance of patient with renal dialysis (CMS/[facility])    • Tobacco abuse        Past Surgical History:  Past Surgical History:   Procedure Laterality Date   • ARTERIOVENOUS FISTULA     • CATARACT EXTRACTION WITH INTRAOCULAR LENS IMPLANT     •  SECTION     • CHOLECYSTECTOMY     • MYRINGOTOMY W/ TUBES Bilateral    • MYRINGOTOMY W/ TUBES Right    • TUBAL ABDOMINAL LIGATION         Family History  Family History   Problem Relation Age of Onset   • Heart disease Mother    • Diabetes Father    • Colon cancer Neg Hx    • Colon polyps Neg Hx        Social History  Social History     Socioeconomic History   • Marital status: Single     Spouse name: Not on file   • Number of children: Not on file   • Years of education: Not on file   • Highest education level: Not on file   Tobacco Use   • Smoking status: Current Every Day Smoker     Packs/day: 2.00     Years: 6.00     Pack years: 12.00     Types: Cigarettes   • Smokeless tobacco: Never Used   Vaping Use   • Vaping Use: Never used   Substance  and Sexual Activity   • Alcohol use: No   • Drug use: No   • Sexual activity: Defer         Review of Systems:  History obtained from chart review and the patient  General ROS: No fever or chills  Respiratory ROS: No cough, shortness of breath, wheezing  Cardiovascular ROS: No chest pain or palpitations  Gastrointestinal ROS: No abdominal pain or melena  Genito-Urinary ROS: No dysuria or hematuria    Objective:  Patient Vitals for the past 24 hrs:   BP Temp Temp src Pulse Resp SpO2 Weight   07/05/21 1530 150/64 97.6 °F (36.4 °C) Oral 54 20 -- --   07/05/21 0740 144/68 96.5 °F (35.8 °C) Oral 70 18 -- --   07/04/21 2002 148/72 98.2 °F (36.8 °C) Oral 75 18 97 % 66.7 kg (147 lb 0.8 oz)     No intake or output data in the 24 hours ending 07/05/21 1919  General: awake/alert   Chest:  clear to auscultation bilaterally without respiratory distress  CVS: regular rate and rhythm  Abdominal: soft, nontender, positive bowel sounds  Extremities: no cyanosis or edema  Skin: warm and dry without rash      Labs:  Results from last 7 days   Lab Units 07/03/21 0459 07/02/21 0440 07/01/21 0242   WBC 10*3/mm3 4.80 6.40 10.66   HEMOGLOBIN g/dL 10.8* 10.0* 10.1*   HEMATOCRIT % 36.0 33.7* 33.7*   PLATELETS 10*3/mm3 198 188 199         Results from last 7 days   Lab Units 07/03/21 0459 07/02/21 0440 07/01/21  0242 06/30/21  1124   SODIUM mmol/L 132* 136 135* 137   POTASSIUM mmol/L 4.8 4.4 4.8 4.2   CHLORIDE mmol/L 91* 96* 94* 94*   CO2 mmol/L 28.0 30.0* 28.0 29.0   BUN mg/dL 30* 23 31* 26*   CREATININE mg/dL 3.93* 3.24* 4.33* 3.75*   CALCIUM mg/dL 9.1 8.8 8.7 9.0   BILIRUBIN mg/dL  --   --   --  0.9   ALK PHOS U/L  --   --   --  314*   ALT (SGPT) U/L  --   --   --  6   AST (SGOT) U/L  --   --   --  15   GLUCOSE mg/dL 122* 139* 82 210*       Radiology:   Imaging Results (Last 72 Hours)     ** No results found for the last 72 hours. **          Culture:  Blood Culture   Date Value Ref Range Status   06/30/2021 No growth at 5 days  Final    06/30/2021 No growth at 5 days  Final         Assessment   End-stage renal disease  Metabolic encephalopathy  Type 2 diabetes   Hypertension with periods of hypotension  Anemia of CKD  COPD  C Diff colitis    Plan:  Hemodialysis Tuesday Thursday Saturday  Monitor labs   Antibiotics per primary service      Adi Gonzalez MD  7/5/2021  19:19 CDT

## 2021-07-06 NOTE — PROGRESS NOTES
Nephrology ([facility]) Progress Note      Patient:  Mini Gentile  YOB: 1960  Date of Service: 7/6/2021  MRN: [id number]   Acct: [id number]   Primary Care Physician: Bharati Dahl APRN  Advance Directive:   Code Status and Medical Interventions:   Ordered at: 06/30/21 1620     Level Of Support Discussed With:    Patient     Code Status:    CPR     Medical Interventions (Level of Support Prior to Arrest):    Full     Admit Date: 6/30/2021       Hospital Day: 6  Referring Provider: No ref. provider found      Patient personally seen and examined.  Complete chart including Consults, Notes, Operative Reports, Labs, Cardiology, and Radiology studies reviewed as able.        Subjective:  Mini Gentile is a 61 y.o. female  whom we were consulted for ESRD.  On maintenance hemodialysis at [facility] on Tuesday Thursday Saturday.  She was recently hospitalized for C.Diff and pseudomonal bacteremia.  She was discharged back to SNF on 6/24/21.  She then developed a fever of 102, diarrhea, altered mental status, and right shoulder pain (has a known history of right shoulder fracture). Patient was first admitted to CCU but was then transferred to the floor.    Today is seen during dialysis. No new complaints or overnight issues.  Dialysis   Pt was seen on RRT. Tolerating well  Modality: Hemodialysis  Access: Arterial Venous Fistula  Location: left upper  QB: 400  QD: 700  UF: 2000 as tolerated    Allergies:  Bupropion, Chantix [varenicline], Gabapentin, Azithromycin, Levofloxacin, Penicillins, and Sulfa antibiotics    Home Meds:  Medications Prior to Admission   Medication Sig Dispense Refill Last Dose   • aspirin 81 MG chewable tablet Chew 81 mg Daily.   6/29/2021 at Unknown time   • atropine 1 % ophthalmic solution Administer 1 drop into the left eye Daily.   6/29/2021 at Unknown time   • brimonidine (ALPHAGAN) 0.2 % ophthalmic solution Administer 1 drop into  the left eye 2 (two) times a day.   2021 at Unknown time   • carvedilol (COREG) 6.25 MG tablet Take 6.25 mg by mouth 2 (Two) Times a Day With Meals.   2021 at Unknown time   • cholecalciferol (VITAMIN D3) 25 MCG (1000 UT) tablet Take 2,000 Units by mouth Daily.   2021 at Unknown time   • dorzolamide (TRUSOPT) 2 % ophthalmic solution Administer 1 drop into the left eye 2 (Two) Times a Day.   2021 at Unknown time   • famotidine (PEPCID) 20 MG tablet Take 1 tablet by mouth Daily.   2021 at Unknown time   • Ferric Citrate 1  MG(Fe) tablet Take 1 tablet by mouth 3 (Three) Times a Day With Meals.   2021 at Unknown time   • fluticasone (FLONASE) 50 MCG/ACT nasal spray 2 sprays into the nostril(s) as directed by provider 2 (Two) Times a Day.   2021 at Unknown time   • lactobacillus acidophilus (RISAQUAD) capsule capsule Take 1 capsule by mouth Daily. 30 capsule 2 2021 at Unknown time   • ondansetron (ZOFRAN) 4 MG tablet Take 1 tablet by mouth Every 6 (Six) Hours As Needed for Nausea or Vomiting. 24 tablet 2 2021 at Unknown time   • sertraline (ZOLOFT) 25 MG tablet Take 1 tablet by mouth Daily.   2021 at Unknown time   • sevelamer (RENAGEL) 800 MG tablet Take 800 mg by mouth 3 (Three) Times a Day With Meals.   2021 at Unknown time   • timolol (TIMOPTIC) 0.5 % ophthalmic solution Administer 1 drop into the left eye Every Morning.  12 2021 at Unknown time   • acetaminophen (TYLENOL) 325 MG tablet Take 650 mg by mouth Every 4 (Four) Hours As Needed for Mild Pain  or Fever.   Unknown at Unknown time   • ipratropium-albuterol (DUO-NEB) 0.5-2.5 mg/3 ml nebulizer Take 3 mL by nebulization Every 6 (Six) Hours As Needed for Wheezing or Shortness of Air.   Unknown at Unknown time   • [] vancomycin 50 MG/ML reconstituted solution oral solution reconstituted Take 2.5 mL by mouth Every 6 (Six) Hours for 18 doses. Indications: Clostridium Difficile Infection 45 mL 0         Medicines:  Current Facility-Administered Medications   Medication Dose Route Frequency Provider Last Rate Last Admin   • acetaminophen (TYLENOL) tablet 650 mg  650 mg Oral Q4H PRN Sarahy Mike, APRN   650 mg at 07/06/21 0959    Or   • acetaminophen (TYLENOL) suppository 650 mg  650 mg Rectal Q4H PRN Sarahy Mike, APRN       • albumin human 25 % IV SOLN 12.5 g  12.5 g Intravenous PRN Jose Caballero MD       • aspirin EC tablet 81 mg  81 mg Oral Daily WoeltSarahy montgomery, APRN   81 mg at 07/05/21 0829   • atropine 1 % ophthalmic solution 1 drop  1 drop Left Eye Daily DorothyeltSarahy montgomery APRN   1 drop at 07/05/21 0828   • brimonidine (ALPHAGAN) 0.2 % ophthalmic solution 1 drop  1 drop Left Eye BID WoeltSarahy montgomery APRN   1 drop at 07/05/21 2018   • carvedilol (COREG) tablet 6.25 mg  6.25 mg Oral BID With Meals DorothyeltSarahy montgomery APRN   6.25 mg at 07/05/21 1753   • dextrose (D50W) 25 g/ 50mL Intravenous Solution 25 g  25 g Intravenous Q15 Min PRN Sarahy Mike APRN       • dextrose (GLUTOSE) oral gel 15 g  15 g Oral Q15 Min PRN Sarahy Mike, APRN       • famotidine (PEPCID) tablet 20 mg  20 mg Oral Daily WoSarahy carmen APRN   20 mg at 07/05/21 0829   • glucagon (human recombinant) (GLUCAGEN DIAGNOSTIC) injection 1 mg  1 mg Subcutaneous Q15 Min PRN Sarahy Mike, APRN       • heparin (porcine) 5000 UNIT/ML injection 5,000 Units  5,000 Units Subcutaneous Q12H Sarahy Mike APRN   5,000 Units at 07/05/21 2017   • insulin regular (humuLIN R,novoLIN R) injection 2-7 Units  2-7 Units Subcutaneous TID With Meals Bin Guy DO   3 Units at 07/05/21 1204   • nicotine (NICODERM CQ) 21 MG/24HR patch 1 patch  1 patch Transdermal Q24H Sundeep Aldridge MD   1 patch at 07/04/21 2109   • ondansetron (ZOFRAN) injection 4 mg  4 mg Intravenous Q6H PRN Sarahy Mike, ANTHONY       • Pharmacy Consult   Does not apply Continuous PRN Sundeep Aldridge MD       •  pravastatin (PRAVACHOL) tablet 10 mg  10 mg Oral Nightly Woeltz, Sarahy K, APRN   10 mg at 21   • saccharomyces boulardii (FLORASTOR) capsule 500 mg  500 mg Oral BID Woeltz, Sarahy K, APRN   500 mg at 21   • sertraline (ZOLOFT) tablet 25 mg  25 mg Oral Daily Woeltz, Sarahy K, APRN   25 mg at 21 0829   • sodium chloride 0.9 % flush 10 mL  10 mL Intravenous PRN Woeltz, Sarahy K, APRN       • sodium chloride 0.9 % flush 10 mL  10 mL Intravenous Q12H Woeltz, Sarahy K, APRN   10 mL at 21   • sodium chloride 0.9 % flush 10 mL  10 mL Intravenous PRN Woeltz, Sarahy K, APRN       • timolol (TIMOPTIC) 0.5 % ophthalmic solution 1 drop  1 drop Left Eye QAM Woeltz, Sarahy K, APRN   1 drop at 21 0546   • vancomycin oral solution 125 mg  125 mg Oral Q6H Sundeep Aldridge MD   125 mg at 21 0546       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)    • End stage renal disease on dialysis (CMS/[facility])    • Eustachian tube dysfunction    • GERD (gastroesophageal reflux disease)    • Glaucoma    • HTN (hypertension)    • Hyperlipidemia    • Mucoid otitis media    • Noncompliance of patient with renal dialysis (CMS/[facility])    • Tobacco abuse        Past Surgical History:  Past Surgical History:   Procedure Laterality Date   • ARTERIOVENOUS FISTULA     • CATARACT EXTRACTION WITH INTRAOCULAR LENS IMPLANT     •  SECTION     • CHOLECYSTECTOMY     • MYRINGOTOMY W/ TUBES Bilateral    • MYRINGOTOMY W/ TUBES Right    • TUBAL ABDOMINAL LIGATION         Family History  Family History   Problem Relation Age of Onset   • Heart disease Mother    • Diabetes Father    • Colon cancer Neg Hx    • Colon polyps Neg Hx        Social History  Social History     Socioeconomic History   • Marital status: Single     Spouse name: Not on file   • Number of children: Not on  file   • Years of education: Not on file   • Highest education level: Not on file   Tobacco Use   • Smoking status: Current Every Day Smoker     Packs/day: 2.00     Years: 6.00     Pack years: 12.00     Types: Cigarettes   • Smokeless tobacco: Never Used   Vaping Use   • Vaping Use: Never used   Substance and Sexual Activity   • Alcohol use: No   • Drug use: No   • Sexual activity: Defer         Review of Systems:  History obtained from chart review and the patient  General ROS: No fever or chills  Respiratory ROS: No cough, shortness of breath, wheezing  Cardiovascular ROS: No chest pain or palpitations  Gastrointestinal ROS: No abdominal pain or melena  Genito-Urinary ROS: No dysuria or hematuria    Objective:  Patient Vitals for the past 24 hrs:   BP Temp Temp src Pulse Resp SpO2 Weight   07/06/21 0800 -- -- -- -- -- -- 69.4 kg (153 lb)   07/06/21 0737 148/68 97.6 °F (36.4 °C) Oral 74 18 98 % --   07/06/21 0131 170/69 97 °F (36.1 °C) Oral 70 18 97 % --   07/05/21 2026 148/72 96.7 °F (35.9 °C) -- 71 18 98 % --   07/05/21 1530 150/64 97.6 °F (36.4 °C) Oral 54 20 -- --     No intake or output data in the 24 hours ending 07/06/21 1047  General: awake/alert   Chest:  clear to auscultation bilaterally without respiratory distress  CVS: regular rate and rhythm  Abdominal: soft, nontender, positive bowel sounds  Extremities: no cyanosis or edema  Skin: warm and dry without rash      Labs:  Results from last 7 days   Lab Units 07/03/21  0459 07/02/21 0440 07/01/21  0242   WBC 10*3/mm3 4.80 6.40 10.66   HEMOGLOBIN g/dL 10.8* 10.0* 10.1*   HEMATOCRIT % 36.0 33.7* 33.7*   PLATELETS 10*3/mm3 198 188 199         Results from last 7 days   Lab Units 07/03/21  0459 07/02/21  0440 07/01/21  0242 06/30/21  1124   SODIUM mmol/L 132* 136 135* 137   POTASSIUM mmol/L 4.8 4.4 4.8 4.2   CHLORIDE mmol/L 91* 96* 94* 94*   CO2 mmol/L 28.0 30.0* 28.0 29.0   BUN mg/dL 30* 23 31* 26*   CREATININE mg/dL 3.93* 3.24* 4.33* 3.75*   CALCIUM mg/dL  9.1 8.8 8.7 9.0   BILIRUBIN mg/dL  --   --   --  0.9   ALK PHOS U/L  --   --   --  314*   ALT (SGPT) U/L  --   --   --  6   AST (SGOT) U/L  --   --   --  15   GLUCOSE mg/dL 122* 139* 82 210*       Radiology:   Imaging Results (Last 72 Hours)     ** No results found for the last 72 hours. **          Culture:  Blood Culture   Date Value Ref Range Status   06/30/2021 No growth at 5 days  Final   06/30/2021 No growth at 5 days  Final         Assessment   1.  End stage renal disease on HD TTS  2.  Metabolic encephalopathy  3.  Type 2 diabetes   4.  Hypertension with periods of hypotension  5.  Anemia of CKD  6.  COPD  7.  C Diff colitis  8.  hyponatremia    Plan:  1.  Dialysis today  2.  OK for discharge from renal standpoint. Follow up will be via dialysis clinic  3.  Discussed with Dr Luis Carlos Lemos, APRN  7/6/2021  10:47 CDT

## 2021-07-06 NOTE — PROGRESS NOTES
Continued Stay Note  [facility]     Patient Name: Mini Gentile  MRN: [id number]  Today's Date: 7/6/2021    Admit Date: 6/30/2021    Discharge Plan     Row Name 07/06/21 1243       Plan    Final Note  Notified Elsy at [facility] that pt will not be ready for dc today due to not being medically stable. Will follow up in AM.    Row Name 07/06/21 1005       Plan    Final Note  Pt can dc to [facility] today, skilled level. Precert is complete. Notified Neetu at [facility] and pt can continue outpt HD tomorrow at [facility]. Pt will need a new Covid test. RN can call report to [phone]    Row Name 07/06/21 1001       Plan    Plan Comments  Per Elsy, precert is complete and pt can dc to [facility] today. Notified Physician.    Final Discharge Disposition Code  03 - skilled nursing facility (SNF)        Discharge Codes    No documentation.             MICA Concepcion

## 2021-07-06 NOTE — PROGRESS NOTES
Continued Stay Note  [facility]     Patient Name: Mini Gentile  MRN: [id number]  Today's Date: 7/6/2021    Admit Date: 6/30/2021    Discharge Plan     Row Name 07/06/21 1005       Plan    Final Note  Pt can dc to [facility] today, skilled level. Precert is complete. Notified Neetu at [facility] and pt can continue outpt HD tomorrow at [place] location. Pt will need a new Covid test. RN can call report to [phone]    Row Name 07/06/21 1001       Plan    Plan Comments  Per Elsy, precert is complete and pt can dc to [facility] today. Notified Physician.    Final Discharge Disposition Code  03 - skilled nursing facility (SNF)        Discharge Codes    No documentation.             MICA Concepcion

## 2021-07-06 NOTE — PLAN OF CARE
Goal Outcome Evaluation:  Plan of Care Reviewed With: patient        Progress: improving  Outcome Summary: Pt was up in chair and wanted assistance with dressing. Pt was able to don sock Independently but PTA put on shoes and pants. Pt was cga-sba with standing while pants were donned. Pt then walked 150 ft cga-sba with RW. Pt states she will have her  to help her at home. Pt would benefit from continued PT while here at hospital.

## 2021-07-06 NOTE — PLAN OF CARE
Problem: Adult Inpatient Plan of Care  Goal: Plan of Care Review  Outcome: Ongoing, Not Progressing  Flowsheets (Taken 7/6/2021 9843)  Progress: no change  Plan of Care Reviewed With: patient  Outcome Summary: Patient is alert and oriented to self and situation but forgetful at times.  BM X 5.  Disregards reminders to use call light frequently.  Attempts to get up out of bed without assistance.  VSS.  Tele on hold due to repeated removal by patient.

## 2021-07-06 NOTE — PROGRESS NOTES
1           [facility] Medicine Services  INPATIENT PROGRESS NOTE    Patient Name: Mini Gentile  Date of Admission: 6/30/2021  Today's Date: 07/06/21  Length of Stay: 6  Primary Care Physician: Bharati Dahl APRN    Subjective   Chief Complaint: Follow-up  HPI   She is a 61-year-old woman with end-stage renal disease.  She has been in and out of the hospital.  In June 2021 alone she had 3 hospitalizations.  The last time she was hospitalized dates back to June 16 where she was found with Pseudomonas bacteremia and C. difficile associated diarrhea.  She was seen in consult at that time by infectious disease.  She completed a course of treatment for bacteremia.  She was continued on vancomycin upon discharge.  She was sent to skilled nursing facility.  She presented back on June 13 with fever and altered mental status.  She is suspected to have encephalopathy from underlying needs for dialysis and infection.  Etiology is unclear.  Reportedly has been having diarrhea at facility but no sample as of yet since hospitalized.  Patient reportedly has right pleural effusion but generally has volume overload from dialysis needs.  She has no symptoms of shortness of breath or cough.  Patient reportedly anuric/oliguric and been on maintenance dialysis.     During this admission she has been seen and evaluated by infectious disease and nephrology service.  The plan was for a 2-week course of vancomycin from initiation on June 30.  She is cleared by infectious disease for discharge.  She required pre-CERT to go back to skilled nursing facility.  I am informed by our  that this went through today.  However patient still has so frequent diarrhea.  She had 5 bowel movements overnight per nurse.  She is on second hour of her dialysis in the dialysis nurse had change her to 3 times.  She has an hour and a half left on her dialysis    Patient likewise seen by Ortho for shoulder  injury that occurred few months back.  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  Dr. Springer recommends repeating x-ray right shoulder in 4 weeks.     Patient went through her regular dialysis schedule (TT).  She gets dialyzed at [facility].  Nephrology service cleared her for discharge postdialysis.     Patient remains hemodynamically stable.  She is afebrile and not requiring any oxygen.      Review of Systems   Denies any abdominal pain  All pertinent negatives and positives are as above. All other systems have been reviewed and are negative unless otherwise stated.     Objective    Temp:  [96.7 °F (35.9 °C)-97.6 °F (36.4 °C)] 97.6 °F (36.4 °C)  Heart Rate:  [54-74] 74  Resp:  [18-20] 18  BP: (148-170)/(64-72) 148/68  Physical Exam   She is laying on her left side as the dialysis nurse cleaning her.  She is not in distress  She states that she is doing better  She is more interactive today  She is awake alert and oriented x3  Normal respiratory effort  Extremities: atraumatic, no cyanosis; positive edema of ankles  Skin: no rashes or lesions  Neuro: AAOx3, no focal deficits  Psych: normal mood & affect  I am limited in approaching her given the dialysis machine on the way in the nares cleaning her up.    Results Review:  I have reviewed the labs, radiology results, and diagnostic studies.    Laboratory Data:   Results from last 7 days   Lab Units 07/03/21 0459 07/02/21 0440 07/01/21  0242   WBC 10*3/mm3 4.80 6.40 10.66   HEMOGLOBIN g/dL 10.8* 10.0* 10.1*   HEMATOCRIT % 36.0 33.7* 33.7*   PLATELETS 10*3/mm3 198 188 199        Results from last 7 days   Lab Units 07/03/21 0459 07/02/21 0440 07/01/21  0242 06/30/21  1124   SODIUM mmol/L 132* 136 135* 137   POTASSIUM mmol/L 4.8 4.4 4.8 4.2   CHLORIDE mmol/L 91* 96* 94* 94*   CO2 mmol/L 28.0 30.0* 28.0 29.0   BUN mg/dL 30* 23 31* 26*   CREATININE mg/dL 3.93* 3.24* 4.33* 3.75*   CALCIUM mg/dL 9.1 8.8 8.7 9.0      BILIRUBIN mg/dL  --   --   --  0.9   ALK PHOS U/L  --   --   --  314*   ALT (SGPT) U/L  --   --   --  6   AST (SGOT) U/L  --   --   --  15   GLUCOSE mg/dL 122* 139* 82 210*       Culture Data:   Blood Culture   Date Value Ref Range Status   06/30/2021 No growth at 5 days  Final   06/30/2021 No growth at 5 days  Final       Radiology Data:   Imaging Results (Last 24 Hours)     ** No results found for the last 24 hours. **          I have reviewed the patient's current medications.     Assessment/Plan     Active Hospital Problems    Diagnosis    • **Altered mental status    • Fever    • Elevated troponin    • Fx humeral neck, right, with nonunion, subsequent encounter    • Pleural effusion on right    • C. difficile diarrhea    • Chronic anemia    • DM2 (diabetes mellitus, type 2) (CMS/[facility])    • HTN (hypertension)            Due to frequency of diarrhea as well as feedback from dialysis nurse who told me that the likelihood that dialysis at the dialysis clinic would stop due to frequency of diarrhea and failure to complete her dialysis as a consequence of it, I would postpone any plan of discharge until frequency has improved    We will continue on vancomycin p.o.    Discussed with nephrology service, social service and case management as well as nursing  Patient informed of plan    Monitor electrolytes and correct as needed  aspirin, 81 mg, Oral, Daily  atropine, 1 drop, Left Eye, Daily  brimonidine, 1 drop, Left Eye, BID  carvedilol, 6.25 mg, Oral, BID With Meals  famotidine, 20 mg, Oral, Daily  heparin (porcine), 5,000 Units, Subcutaneous, Q12H  insulin regular, 2-7 Units, Subcutaneous, TID With Meals  nicotine, 1 patch, Transdermal, Q24H  pravastatin, 10 mg, Oral, Nightly  saccharomyces boulardii, 500 mg, Oral, BID  sertraline, 25 mg, Oral, Daily  sodium chloride, 10 mL, Intravenous, Q12H  timolol, 1 drop, Left Eye, QAM  vancomycin, 125 mg, Oral, Q6H            Discharge Planning: tbd    Electronically signed by  Sundeep Aldridge MD, 07/06/21, 11:06 CDT.

## 2021-07-06 NOTE — THERAPY TREATMENT NOTE
Acute Care - Physical Therapy Treatment Note  [facility]     Patient Name: Mini Gentile  : 1960  MRN: [id number]  Today's Date: 2021           PT Assessment (last 12 hours)      PT Evaluation and Treatment     Row Name 21 1358          Physical Therapy Time and Intention    Subjective Information  complains of;swelling  -AE     Document Type  therapy note (daily note)  -AE     Mode of Treatment  physical therapy  -AE     Row Name 21 1358          General Information    Existing Precautions/Restrictions  fall  -AE     Row Name 21 1358          Pain Scale: Word Pre/Post-Treatment    Pain: Word Scale, Pretreatment  0 - no pain  -AE     Posttreatment Pain Rating  0 - no pain  -AE     Row Name 21 135          Bed Mobility    Comment (Bed Mobility)  chair  -AE     Row Name 21 1358          Transfers    Sit-Stand [place] (Transfers)  contact guard  -AE     Stand-Sit [place] (Transfers)  contact guard Assisted pt with dressing  -AE     Row Name 21 135          Gait/Stairs (Locomotion)    [place] Level (Gait)  standby assist  -AE     Assistive Device (Gait)  walker, front-wheeled  -AE     Distance in Feet (Gait)  150 x 1 sit rest due to uncomfortable shoes  -AE     Row Name 21 135          Hip (Therapeutic Exercise)    Hip (Therapeutic Exercise)  AROM (active range of motion)  -AE     Row Name 21 1358          Knee (Therapeutic Exercise)    Knee (Therapeutic Exercise)  AROM (active range of motion)  -AE     Knee AROM (Therapeutic Exercise)  LAQ (long arc quad);10 repetitions;2 sets  -AE     Row Name 21 135          Ankle (Therapeutic Exercise)    Ankle (Therapeutic Exercise)  AROM (active range of motion)  -AE     Ankle AROM (Therapeutic Exercise)  10 repetitions;2 sets  -AE     Row Name             Wound 21 0142 coccyx    Wound - Properties Group Placement Date: 21  -VT Placement Time: 142 Location: coccyx  -VT     Retired Wound - Properties Group Date first assessed: 04/04/21  -VT Time first assessed: 0142  -VT Location: coccyx  -VT    Row Name 07/06/21 1358          Positioning and Restraints    Pre-Treatment Position  sitting in chair/recliner  -AE     Post Treatment Position  chair  -AE     In Chair  sitting;call light within reach;exit alarm on  -AE       User Key  (r) = Recorded By, (t) = Taken By, (c) = Cosigned By    Initials Name Provider Type    AE Ann Lee PTA Physical Therapy Assistant    VT Alyx Watson, RN Registered Nurse        Physical Therapy Education                 Title: PT OT SLP Therapies (In Progress)     Topic: Physical Therapy (In Progress)     Point: Mobility training (Done)     Learning Progress Summary           Patient Acceptance, E, VU by NAOMI at 7/5/2021 0924    Comment: safety with transfers and amb    Acceptance, E, VU by MS at 7/2/2021 0841    Comment: role of PT in her care                   Point: Home exercise program (Not Started)     Learner Progress:  Not documented in this visit.          Point: Body mechanics (Not Started)     Learner Progress:  Not documented in this visit.          Point: Precautions (Not Started)     Learner Progress:  Not documented in this visit.                      User Key     Initials Effective Dates Name Provider Type Discipline    MS 06/19/18 -  Miaa Ovalle, PT, DPT, NCS Physical Therapist PT    NAOMI 12/08/16 -  Domingo Dill PTA Physical Therapy Assistant PT              PT Recommendation and Plan     Plan of Care Reviewed With: patient  Progress: improving  Outcome Summary: Pt was up in chair and wanted assistance with dressing. Pt was able to don sock Independently but PTA put on shoes and pants. Pt was cga-sba with standing while pants were donned. Pt then walked 150 ft cga-sba with RW. Pt states she will have her  to help her at home. Pt would benefit from continued PT while here at hospital.  Outcome Measures     Row Name  07/05/21 1300             How much help from another is currently needed...    Putting on and taking off regular lower body clothing?  2  -TS      Bathing (including washing, rinsing, and drying)  3  -TS      Toileting (which includes using toilet bed pan or urinal)  3  -TS      Putting on and taking off regular upper body clothing  3  -TS      Taking care of personal grooming (such as brushing teeth)  3  -TS      Eating meals  3  -TS      AM-PAC 6 Clicks Score (OT)  17  -TS        User Key  (r) = Recorded By, (t) = Taken By, (c) = Cosigned By    Initials Name Provider Type    TS Bijal Lindquist, SEFERINO/L Occupational Therapy Assistant           Time Calculation:   PT Charges     Row Name 07/06/21 1448             Time Calculation    Start Time  1358  -AE      Stop Time  1440  -AE      Time Calculation (min)  42 min  -AE      PT Received On  07/06/21  -AE      PT Goal Re-Cert Due Date  07/12/21  -AE         Time Calculation- PT    Total Timed Code Minutes- PT  42 minute(s)  -AE         Timed Charges    28912 - PT Therapeutic Exercise Minutes  12  -AE      96976 - PT Therapeutic Activity Minutes  30  -AE         Total Minutes    Timed Charges Total Minutes  42  -AE       Total Minutes  42  -AE        User Key  (r) = Recorded By, (t) = Taken By, (c) = Cosigned By    Initials Name Provider Type    AE Ann Lee PTA Physical Therapy Assistant        Therapy Charges for Today     Code Description Service Date Service Provider Modifiers Qty    53332823721 HC PT THER PROC EA 15 MIN 7/6/2021 Ann Lee PTA GP 1    49471728706 HC GAIT TRAINING EA 15 MIN 7/6/2021 Ann Lee PTA GP 2          PT G-Codes  Outcome Measure Options: AM-PAC 6 Clicks Basic Mobility (PT)  AM-PAC 6 Clicks Score (PT): 14  AM-PAC 6 Clicks Score (OT): 17    Ann Lee PTA  7/6/2021

## 2021-07-06 NOTE — PLAN OF CARE
Goal Outcome Evaluation:      Nutrition: Follow up completed. Pt is currently on regular thin diet. Average PO intake 75% of 2 meals. Plans to D/C to [place]. RDN will continue to follow pt and D/C needs.

## 2021-07-07 NOTE — PLAN OF CARE
Problem: Adult Inpatient Plan of Care  Goal: Plan of Care Review  Outcome: Ongoing, Progressing  Flowsheets (Taken 7/7/2021 0877)  Progress: improving  Plan of Care Reviewed With: patient  Outcome Summary: Pt alert and disoriented to time and situation. VSS. 3x BM's overnight, incontinent. C/o her butt hurting frequently. Frequently jumping out of bed without calling for help, bed alarm and chair alarms used. Heparin for VTE. Call light within reach, safety maintained.

## 2021-07-07 NOTE — PROGRESS NOTES
Nephrology ([facility]) Progress Note      Patient:  Mini Gentile  YOB: 1960  Date of Service: 7/7/2021  MRN: [id number]   Acct: [id number]   Primary Care Physician: Bharati Dahl APRN  Advance Directive:   Code Status and Medical Interventions:   Ordered at: 06/30/21 1620     Level Of Support Discussed With:    Patient     Code Status:    CPR     Medical Interventions (Level of Support Prior to Arrest):    Full     Admit Date: 6/30/2021       Hospital Day: 7  Referring Provider: No ref. provider found      Patient personally seen and examined.  Complete chart including Consults, Notes, Operative Reports, Labs, Cardiology, and Radiology studies reviewed as able.        Subjective:  Mini Gentile is a 61 y.o. female  whom we were consulted for ESRD.  On maintenance hemodialysis at [facility] on Tuesday Thursday Saturday.  She was recently hospitalized for C.Diff and pseudomonal bacteremia.  She was discharged back to SNF on 6/24/21.  She then developed a fever of 102, diarrhea, altered mental status, and right shoulder pain (has a known history of right shoulder fracture). Patient was first admitted to CCU but was then transferred to the floor. Has been tolerating HD well.    Today is feeling about the same, no new complaints. No new overnight issues.    Allergies:  Bupropion, Chantix [varenicline], Gabapentin, Azithromycin, Levofloxacin, Penicillins, and Sulfa antibiotics    Home Meds:  Medications Prior to Admission   Medication Sig Dispense Refill Last Dose   • aspirin 81 MG chewable tablet Chew 81 mg Daily.   6/29/2021 at Unknown time   • atropine 1 % ophthalmic solution Administer 1 drop into the left eye Daily.   6/29/2021 at Unknown time   • brimonidine (ALPHAGAN) 0.2 % ophthalmic solution Administer 1 drop into the left eye 2 (two) times a day.   6/29/2021 at Unknown time   • carvedilol (COREG) 6.25 MG tablet Take 6.25 mg by mouth 2 (Two)  Times a Day With Meals.   2021 at Unknown time   • cholecalciferol (VITAMIN D3) 25 MCG (1000 UT) tablet Take 2,000 Units by mouth Daily.   2021 at Unknown time   • dorzolamide (TRUSOPT) 2 % ophthalmic solution Administer 1 drop into the left eye 2 (Two) Times a Day.   2021 at Unknown time   • famotidine (PEPCID) 20 MG tablet Take 1 tablet by mouth Daily.   2021 at Unknown time   • Ferric Citrate 1  MG(Fe) tablet Take 1 tablet by mouth 3 (Three) Times a Day With Meals.   2021 at Unknown time   • fluticasone (FLONASE) 50 MCG/ACT nasal spray 2 sprays into the nostril(s) as directed by provider 2 (Two) Times a Day.   2021 at Unknown time   • lactobacillus acidophilus (RISAQUAD) capsule capsule Take 1 capsule by mouth Daily. 30 capsule 2 2021 at Unknown time   • ondansetron (ZOFRAN) 4 MG tablet Take 1 tablet by mouth Every 6 (Six) Hours As Needed for Nausea or Vomiting. 24 tablet 2 2021 at Unknown time   • sertraline (ZOLOFT) 25 MG tablet Take 1 tablet by mouth Daily.   2021 at Unknown time   • sevelamer (RENAGEL) 800 MG tablet Take 800 mg by mouth 3 (Three) Times a Day With Meals.   2021 at Unknown time   • timolol (TIMOPTIC) 0.5 % ophthalmic solution Administer 1 drop into the left eye Every Morning.  12 2021 at Unknown time   • acetaminophen (TYLENOL) 325 MG tablet Take 650 mg by mouth Every 4 (Four) Hours As Needed for Mild Pain  or Fever.   Unknown at Unknown time   • ipratropium-albuterol (DUO-NEB) 0.5-2.5 mg/3 ml nebulizer Take 3 mL by nebulization Every 6 (Six) Hours As Needed for Wheezing or Shortness of Air.   Unknown at Unknown time   • [] vancomycin 50 MG/ML reconstituted solution oral solution reconstituted Take 2.5 mL by mouth Every 6 (Six) Hours for 18 doses. Indications: Clostridium Difficile Infection 45 mL 0        Medicines:  Current Facility-Administered Medications   Medication Dose Route Frequency Provider Last Rate Last Admin   •  acetaminophen (TYLENOL) tablet 650 mg  650 mg Oral Q4H PRN Sarahy Mike APRN   650 mg at 07/07/21 0840    Or   • acetaminophen (TYLENOL) suppository 650 mg  650 mg Rectal Q4H PRN Sarahy Mike APRN       • albumin human 25 % IV SOLN 12.5 g  12.5 g Intravenous PRN Jose Caballero MD       • aspirin EC tablet 81 mg  81 mg Oral Daily Sarahy Mike APRN   81 mg at 07/07/21 0840   • atropine 1 % ophthalmic solution 1 drop  1 drop Left Eye Daily Sarahy Mike APRN   1 drop at 07/07/21 0845   • brimonidine (ALPHAGAN) 0.2 % ophthalmic solution 1 drop  1 drop Left Eye BID Sarahy Mike APRN   1 drop at 07/07/21 0842   • carvedilol (COREG) tablet 6.25 mg  6.25 mg Oral BID With Meals Sarahy Mike APRN   6.25 mg at 07/07/21 0841   • cholestyramine (QUESTRAN) packet 1 packet  1 packet Oral Q8H Sundeep Aldridge MD       • dextrose (D50W) 25 g/ 50mL Intravenous Solution 25 g  25 g Intravenous Q15 Min PRN Sarahy Mike APRN       • dextrose (GLUTOSE) oral gel 15 g  15 g Oral Q15 Min PRN Sarahy Mike APRAC       • famotidine (PEPCID) tablet 20 mg  20 mg Oral Daily Sarahy Mike APRN   20 mg at 07/07/21 0841   • glucagon (human recombinant) (GLUCAGEN DIAGNOSTIC) injection 1 mg  1 mg Subcutaneous Q15 Min PRN Sarahy Mike APRN       • heparin (porcine) 5000 UNIT/ML injection 5,000 Units  5,000 Units Subcutaneous Q12H Sarahy Mike APRN   5,000 Units at 07/07/21 0842   • insulin regular (humuLIN R,novoLIN R) injection 2-7 Units  2-7 Units Subcutaneous TID With Meals Bin Guy DO   5 Units at 07/06/21 1733   • melatonin tablet 6 mg  6 mg Oral Nightly PRN Hang Giles MD   6 mg at 07/06/21 2219   • nicotine (NICODERM CQ) 21 MG/24HR patch 1 patch  1 patch Transdermal Q24H Sundeep Aldridge MD   1 patch at 07/04/21 2109   • ondansetron (ZOFRAN) injection 4 mg  4 mg Intravenous Q6H PRN Sarahy Mike, APRN       • Pharmacy Consult    Does not apply Continuous PRN Sundeep Aldridge MD       • pravastatin (PRAVACHOL) tablet 10 mg  10 mg Oral Nightly Woeltz, Sarahy K, APRN   10 mg at 21   • saccharomyces boulardii (FLORASTOR) capsule 500 mg  500 mg Oral BID Woeltz, Sarahy K, APRN   500 mg at 21 0841   • sertraline (ZOLOFT) tablet 25 mg  25 mg Oral Daily Woeltz, Sarahy K, APRN   25 mg at 21 0841   • sodium chloride 0.9 % flush 10 mL  10 mL Intravenous PRN Woeltz, Sarahy K, APRN       • sodium chloride 0.9 % flush 10 mL  10 mL Intravenous Q12H Woeltz, Sarahy K, APRN   10 mL at 21   • sodium chloride 0.9 % flush 10 mL  10 mL Intravenous PRN Woeltz, Sarahy K, APRN       • timolol (TIMOPTIC) 0.5 % ophthalmic solution 1 drop  1 drop Left Eye QAM WoeltSamantha montgomerySarahy K, APRN   1 drop at 21 0841   • vancomycin oral solution 125 mg  125 mg Oral Q6H Sundeep Aldridge MD   125 mg at 21 0531       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    • Chronic otitis media    • Diabetes (CMS/[facility])    • End stage renal disease on dialysis (CMS/[facility])    • Eustachian tube dysfunction    • GERD (gastroesophageal reflux disease)    • Glaucoma    • HTN (hypertension)    • Hyperlipidemia    • Mucoid otitis media    • Noncompliance of patient with renal dialysis (CMS/[facility])    • Tobacco abuse        Past Surgical History:  Past Surgical History:   Procedure Laterality Date   • ARTERIOVENOUS FISTULA     • CATARACT EXTRACTION WITH INTRAOCULAR LENS IMPLANT     •  SECTION     • CHOLECYSTECTOMY     • MYRINGOTOMY W/ TUBES Bilateral    • MYRINGOTOMY W/ TUBES Right    • TUBAL ABDOMINAL LIGATION         Family History  Family History   Problem Relation Age of Onset   • Heart disease Mother    • Diabetes Father    • Colon cancer Neg Hx    • Colon polyps Neg Hx        Social History  Social History     Socioeconomic History   • Marital status:  "Single     Spouse name: Not on file   • Number of children: Not on file   • Years of education: Not on file   • Highest education level: Not on file   Tobacco Use   • Smoking status: Current Every Day Smoker     Packs/day: 2.00     Years: 6.00     Pack years: 12.00     Types: Cigarettes   • Smokeless tobacco: Never Used   Vaping Use   • Vaping Use: Never used   Substance and Sexual Activity   • Alcohol use: No   • Drug use: No   • Sexual activity: Defer         Review of Systems:  History obtained from chart review and the patient  General ROS: No fever or chills  Respiratory ROS: No cough, shortness of breath, wheezing  Cardiovascular ROS: No chest pain or palpitations  Gastrointestinal ROS: No abdominal pain or melena  Genito-Urinary ROS: No dysuria or hematuria    Objective:  Patient Vitals for the past 24 hrs:   BP Temp Temp src Pulse Resp SpO2 Height Weight   07/07/21 0840 127/60 98.1 °F (36.7 °C) Oral 72 16 96 % -- --   07/06/21 2358 134/58 97.6 °F (36.4 °C) Tympanic 66 18 99 % -- --   07/06/21 1933 126/55 98.2 °F (36.8 °C) Temporal 66 18 100 % -- --   07/06/21 1712 -- -- -- -- -- -- 147.3 cm (57.99\") 66.1 kg (145 lb 11.6 oz)   07/06/21 1639 158/89 98.2 °F (36.8 °C) Oral 88 18 97 % -- --   07/06/21 1245 163/95 97.7 °F (36.5 °C) Oral 82 18 98 % -- 66.1 kg (145 lb 11.6 oz)       Intake/Output Summary (Last 24 hours) at 7/7/2021 1050  Last data filed at 7/6/2021 1245  Gross per 24 hour   Intake --   Output 3300 ml   Net -3300 ml     General: awake/alert   Chest:  clear to auscultation bilaterally without respiratory distress  CVS: regular rate and rhythm  Abdominal: soft, nontender, positive bowel sounds  Extremities: no cyanosis or edema  Skin: warm and dry without rash      Labs:  Results from last 7 days   Lab Units 07/03/21  0459 07/02/21  0440 07/01/21  0242   WBC 10*3/mm3 4.80 6.40 10.66   HEMOGLOBIN g/dL 10.8* 10.0* 10.1*   HEMATOCRIT % 36.0 33.7* 33.7*   PLATELETS 10*3/mm3 198 188 199         Results from " last 7 days   Lab Units 07/07/21  0533 07/03/21  0459 07/02/21  0440 06/30/21  1124   SODIUM mmol/L 132* 132* 136 137   POTASSIUM mmol/L 5.0 4.8 4.4 4.2   CHLORIDE mmol/L 95* 91* 96* 94*   CO2 mmol/L 25.0 28.0 30.0* 29.0   BUN mg/dL 21 30* 23 26*   CREATININE mg/dL 3.69* 3.93* 3.24* 3.75*   CALCIUM mg/dL 9.1 9.1 8.8 9.0   BILIRUBIN mg/dL  --   --   --  0.9   ALK PHOS U/L  --   --   --  314*   ALT (SGPT) U/L  --   --   --  6   AST (SGOT) U/L  --   --   --  15   GLUCOSE mg/dL 98 122* 139* 210*       Radiology:   Imaging Results (Last 72 Hours)     ** No results found for the last 72 hours. **          Culture:  Blood Culture   Date Value Ref Range Status   06/30/2021 No growth at 5 days  Final   06/30/2021 No growth at 5 days  Final         Assessment   1.  End stage renal disease on HD TTS  2.  Metabolic encephalopathy  3.  Type 2 diabetes   4.  Hypertension with periods of hypotension  5.  Anemia of CKD  6.  COPD  7.  C Diff colitis  8.  hyponatremia    Plan:  1.  Dialysis next due 7/08  2.  OK for discharge from renal standpoint. Follow up will be via dialysis clinic        Gomez Lemos, ANTHONY  7/7/2021  10:50 CDT

## 2021-07-07 NOTE — PLAN OF CARE
Goal Outcome Evaluation:  Plan of Care Reviewed With: patient        Progress: improving  Outcome Summary: Pt up in chair and cga-sba to stand. Pt walked 150ft sba with RW. Pt wanted to walk more but needed to have a BM. Pt was assisted in bathroom and was sba-cga to sit and stand from [facility]. Pt needed assist with donning and doffing pants. Pt was sba to walk back to chair and completed AROM B LE ex's x 20 reps. Pt then was assisted in bathroom again. Pt had 2 BMs and was left up in chair. Pt would benefit from continued PT while here at hospital.

## 2021-07-07 NOTE — PLAN OF CARE
Goal Outcome Evaluation:  Plan of Care Reviewed With: patient        Progress: improving  Outcome Summary: Disoriented to time. Orientation varies. C/o pain. Prn tylenol given with good relief. Denies n/t. PPP. MONSALVE. Tele NS 68. . Room air. Blood sugar monitored. Contact precautions maintained. Call light within reach. Pt has been up in chair majority of shift.

## 2021-07-07 NOTE — PROGRESS NOTES
"    [facility] Medicine Services  INPATIENT PROGRESS NOTE    Patient Name: Mini Gentile  Date of Admission: 6/30/2021  Today's Date: 07/07/21  Length of Stay: 7  Primary Care Physician: Bharati Dahl APRN    Subjective   Chief Complaint: Follow-up  HPI     Patient was readmitted on June 30 the presenting with fever and altered mental status.  Patient was previously seen on June 16 for Pseudomonas bacteremia and C. difficile associated diarrhea.  Patient completed at that time short course treatment for the bacteremia as directed by her infectious disease.  At that time as well vancomycin was continued after the treatment course for the bacteremia.  As of yesterday, during dialysis, the dialysis tech already change her 3 times.  We postponed her discharge due to frequency of bowel movement.  It has been recorded she had total of 5 times throughout the day and had 3 times already throughout the night.    Electrolytes to date showed no significant difference from previous level on July 3.  No gross hypokalemia.  Levels are as expected from patient with end-stage renal disease.    I confirmed yesterday with social workers regarding need to control better the frequency of diarrhea otherwise she will be sent back to skilled nursing facility for cleaning and would miss her dialysis.    States that she is feeling better.  She claims to have had only one bowel movement since she woke up and could not remember whether she had bowel movement overnight as stated by the nurses  \"Send me home    Review of Systems   Denies any abdominal pain, nausea or vomiting  Denies fever  All pertinent negatives and positives are as above. All other systems have been reviewed and are negative unless otherwise stated.     Objective    Temp:  [97.6 °F (36.4 °C)-98.2 °F (36.8 °C)] 97.6 °F (36.4 °C)  Heart Rate:  [66-88] 66  Resp:  [18] 18  BP: (126-163)/(55-95) 134/58  Physical Exam   Seated comfortably on " recliner.  Noted her feet on the floor.  Noted as well edema of lower extremities  She is not in distress  She states that she is doing better  She is more interactive today  She is awake alert and oriented x3  Normal respiratory effort, diminished breath sounds right lung base compared to left.  Otherwise has adequate air exchange without crackles or wheezes.  Extremities: atraumatic, no cyanosis; positive edema of ankles  Skin: no rashes or lesions  Neuro: AAOx3, no focal deficits  Psych: normal mood & affect      Results Review:  I have reviewed the labs, radiology results, and diagnostic studies.    Laboratory Data:   Results from last 7 days   Lab Units 07/03/21  0459 07/02/21 0440 07/01/21  0242   WBC 10*3/mm3 4.80 6.40 10.66   HEMOGLOBIN g/dL 10.8* 10.0* 10.1*   HEMATOCRIT % 36.0 33.7* 33.7*   PLATELETS 10*3/mm3 198 188 199        Results from last 7 days   Lab Units 07/07/21  0533 07/03/21  0459 07/02/21 0440 06/30/21  1124   SODIUM mmol/L 132* 132* 136 137   POTASSIUM mmol/L 5.0 4.8 4.4 4.2   CHLORIDE mmol/L 95* 91* 96* 94*   CO2 mmol/L 25.0 28.0 30.0* 29.0   BUN mg/dL 21 30* 23 26*   CREATININE mg/dL 3.69* 3.93* 3.24* 3.75*   CALCIUM mg/dL 9.1 9.1 8.8 9.0   BILIRUBIN mg/dL  --   --   --  0.9   ALK PHOS U/L  --   --   --  314*   ALT (SGPT) U/L  --   --   --  6   AST (SGOT) U/L  --   --   --  15   GLUCOSE mg/dL 98 122* 139* 210*       Culture Data:   Blood Culture   Date Value Ref Range Status   06/30/2021 No growth at 5 days  Final   06/30/2021 No growth at 5 days  Final       Radiology Data:   Imaging Results (Last 24 Hours)     ** No results found for the last 24 hours. **          I have reviewed the patient's current medications.     Assessment/Plan     Active Hospital Problems    Diagnosis    • **Altered mental status    • Fever    • Elevated troponin    • Fx humeral neck, right, with nonunion, subsequent encounter    • Pleural effusion on right    • C. difficile diarrhea    • Chronic anemia    • DM2  (diabetes mellitus, type 2) (CMS/[facility])    • HTN (hypertension)        Continue p.o. vancomycin  Add Questran; discussed with nurse regarding closely monitoring frequency and consistency of diarrhea to objectively measure.  Discussed with nurse Moise to facilitate with pharmacist regarding appropriate time interval between Questran and vancomycin to prevent binding of vancomycin   continue on maintenance dialysis per schedule  Monitor electrolytes  Hemodynamically stable    aspirin, 81 mg, Oral, Daily  atropine, 1 drop, Left Eye, Daily  brimonidine, 1 drop, Left Eye, BID  carvedilol, 6.25 mg, Oral, BID With Meals  cholestyramine, 1 packet, Oral, Q8H  famotidine, 20 mg, Oral, Daily  heparin (porcine), 5,000 Units, Subcutaneous, Q12H  insulin regular, 2-7 Units, Subcutaneous, TID With Meals  nicotine, 1 patch, Transdermal, Q24H  pravastatin, 10 mg, Oral, Nightly  saccharomyces boulardii, 500 mg, Oral, BID  sertraline, 25 mg, Oral, Daily  sodium chloride, 10 mL, Intravenous, Q12H  timolol, 1 drop, Left Eye, QAM  vancomycin, 125 mg, Oral, Q6H              Discharge Planning: To be determined    Electronically signed by Sundeep Aldridge MD, 07/07/21, 08:11 CDT.

## 2021-07-07 NOTE — THERAPY TREATMENT NOTE
Acute Care - Physical Therapy Treatment Note  [facility]     Patient Name: Mini Gentile  : 1960  MRN: [id number]  Today's Date: 2021           PT Assessment (last 12 hours)      PT Evaluation and Treatment     Row Name 21 0845          Physical Therapy Time and Intention    Subjective Information  complains of  -AE     Document Type  therapy note (daily note)  -AE     Mode of Treatment  physical therapy  -AE     Row Name 21 0845          General Information    Existing Precautions/Restrictions  fall  -AE     Row Name 21 0845          Bed Mobility    Comment (Bed Mobility)  chair  -AE     Row Name 21 0845          Transfers    Sit-Stand [place] (Transfers)  contact guard  -AE     Stand-Sit [place] (Transfers)  contact guard  -AE     [place] Level (Toilet Transfer)  supervision;verbal cues assisted in bathroom x 2  -AE     Row Name 21 0845          Gait/Stairs (Locomotion)    [place] Level (Gait)  standby assist  -AE     Assistive Device (Gait)  walker, front-wheeled  -AE     Distance in Feet (Gait)  150  -AE     Row Name 21 0845          Hip (Therapeutic Exercise)    Hip (Therapeutic Exercise)  AROM (active range of motion)  -AE     Hip AROM (Therapeutic Exercise)  aBduction;aDduction;10 repetitions  -AE     Row Name 21 0845          Knee (Therapeutic Exercise)    Knee (Therapeutic Exercise)  AROM (active range of motion)  -AE     Knee AROM (Therapeutic Exercise)  heel slides;10 repetitions  -AE     Row Name 21 0845          Ankle (Therapeutic Exercise)    Ankle (Therapeutic Exercise)  AROM (active range of motion)  -AE     Ankle AROM (Therapeutic Exercise)  10 repetitions;2 sets  -AE     Row Name             Wound 21 0142 coccyx    Wound - Properties Group Placement Date: 21  -VT Placement Time: 142 Location: coccyx  -VT    Retired Wound - Properties Group Date first assessed: 21  -VT Time first assessed:  0142  -VT Location: coccyx  -VT    Row Name 07/07/21 0845          Positioning and Restraints    Pre-Treatment Position  sitting in chair/recliner  -AE     Post Treatment Position  chair  -AE     In Chair  sitting;call light within reach  -AE       User Key  (r) = Recorded By, (t) = Taken By, (c) = Cosigned By    Initials Name Provider Type    AE Ann Lee, PTA Physical Therapy Assistant    VT Alyx Watson, RN Registered Nurse        Physical Therapy Education                 Title: PT OT SLP Therapies (In Progress)     Topic: Physical Therapy (In Progress)     Point: Mobility training (Done)     Learning Progress Summary           Patient Acceptance, E, VU by NAOMI at 7/5/2021 0924    Comment: safety with transfers and amb    Acceptance, E, VU by MS at 7/2/2021 0841    Comment: role of PT in her care                   Point: Home exercise program (Not Started)     Learner Progress:  Not documented in this visit.          Point: Body mechanics (Not Started)     Learner Progress:  Not documented in this visit.          Point: Precautions (Not Started)     Learner Progress:  Not documented in this visit.                      User Key     Initials Effective Dates Name Provider Type Discipline    MS 06/19/18 -  Maia Ovalle, PT, DPT, NCS Physical Therapist PT     12/08/16 -  Domingo Dill PTA Physical Therapy Assistant PT              PT Recommendation and Plan     Plan of Care Reviewed With: patient  Progress: improving  Outcome Summary: Pt up in chair and cga-sba to stand. Pt walked 150ft sba with RW. Pt wanted to walk more but needed to have a BM. Pt was assisted in bathroom and was sba-cga to sit and stand from [facility]. Pt needed assist with donning and doffing pants. Pt was sba to walk back to chair and completed AROM B LE ex's x 20 reps. Pt then was assisted in bathroom again. Pt had 2 BMs and was left up in chair. Pt would benefit from continued PT while here at hospital.  Outcome Measures      Row Name 07/05/21 1300             How much help from another is currently needed...    Putting on and taking off regular lower body clothing?  2  -TS      Bathing (including washing, rinsing, and drying)  3  -TS      Toileting (which includes using toilet bed pan or urinal)  3  -TS      Putting on and taking off regular upper body clothing  3  -TS      Taking care of personal grooming (such as brushing teeth)  3  -TS      Eating meals  3  -TS      AM-PAC 6 Clicks Score (OT)  17  -TS        User Key  (r) = Recorded By, (t) = Taken By, (c) = Cosigned By    Initials Name Provider Type    TS Bijal Lindquist, SEFERINO/L Occupational Therapy Assistant           Time Calculation:   PT Charges     Row Name 07/07/21 0937             Time Calculation    Start Time  0845  -AE      Stop Time  0923  -AE      Time Calculation (min)  38 min  -AE      PT Received On  07/07/21  -AE      PT Goal Re-Cert Due Date  07/12/21  -AE         Time Calculation- PT    Total Timed Code Minutes- PT  38 minute(s)  -AE         Timed Charges    38969 - PT Therapeutic Exercise Minutes  8  -AE      34905 - Gait Training Minutes   30  -AE         Total Minutes    Timed Charges Total Minutes  38  -AE       Total Minutes  38  -AE        User Key  (r) = Recorded By, (t) = Taken By, (c) = Cosigned By    Initials Name Provider Type    AE Ann Lee PTA Physical Therapy Assistant        Therapy Charges for Today     Code Description Service Date Service Provider Modifiers Qty    78161261727 HC PT THER PROC EA 15 MIN 7/6/2021 Ann Lee, DAWN GP 1    74891806478 HC GAIT TRAINING EA 15 MIN 7/6/2021 Ann Lee PTA GP 2    32760927216 HC GAIT TRAINING EA 15 MIN 7/7/2021 Ann Lee PTA GP 2    29954839111 HC PT THER PROC EA 15 MIN 7/7/2021 Ann Lee, DAWN GP 1          PT G-Codes  Outcome Measure Options: AM-PAC 6 Clicks Basic Mobility (PT)  AM-PAC 6 Clicks Score (PT): 14  AM-PAC 6 Clicks Score (OT): 17    Ann Lee  PTA  7/7/2021

## 2021-07-08 NOTE — NURSING NOTE
Telemetry monitor room called asking me to check on the patient in 335 for possible asystole. I walked in the patient's room and she was laying on her left side and slumped against the side rail of the bed. I hung up the phone with telemetry checked for a pulse and breathing. No pulse and no breathing detected. Called a code blue overhead at 7:37 am. Asked [facility] as she walked in to get crash cart. Michael came in also and helped roll the patient on her back and began CPR. Crash cart arrived and back board was placed under patient. CPR continued. CPR pads placed as others arrived to respond to the code blue.

## 2021-07-08 NOTE — PROCEDURES
Intubation    Date/Time: 7/8/2021 8:31 AM  Performed by: Sundeep Aldridge MD  Authorized by: Sundeep Aldridge MD   Indications: cardirespiratory arrest.  Intubation method: video-assisted  Patient status: lethargic/unresponsive.  Laryngoscope size: Mac 3  Tube size: 7.5 mm  Tube type: cuffed  Number of attempts: 2  Cricoid pressure: no  Cords visualized: yes  Post-procedure assessment: ETCO2 monitor  Breath sounds: reduced on right  Cuff inflated: yes  ETT to lip: 23 cm  Tube secured with: ETT guevara  Chest x-ray interpreted by me.  Chest x-ray findings: endotracheal tube in appropriate position (readjusted to 22 on the lip and confirmed on cxr; presence of right pleural effusion noted)  Comments: Hemoptysis encountered during the process. Unclear source of bleeding.  Patient suctioned.  No significant blood comes out after suctioning.  Patient is easily bagged thereafter.

## 2021-07-08 NOTE — PLAN OF CARE
Problem: Adult Inpatient Plan of Care  Goal: Plan of Care Review  Outcome: Ongoing, Progressing  Flowsheets (Taken 7/8/2021 7140)  Progress: improving  Plan of Care Reviewed With: patient  Outcome Summary: Alert and orient to self, and time. Confused at times. Frequently seeks out staff. NS 68 on tele, room air. Bed alarm set, call light in reach, safety maintained.

## 2021-07-08 NOTE — CONSULTS
PULMONARY AND CRITICAL CARE CONSULT - [facility]    Mini Gentile   MR# [id number]  Acct# [id number]  7/8/2021   16:21 CDT    Referring Provider: Sundeep Aldridge*    Chief Complaint: Mechanically ventilated    HPI: We are consulted by Sundeep Aldridge* to see this 61 y.o. female born on 1960.  She developed cardiac arrest in medical floor this morning and was intubated and transferred to the intensive care unit and pulmonary team was consulted for ventilator and critical care management.    Patient is a 61-year-old female with a history of ESRD on dialysis, diabetes, hypertension, and is a nursing home resident.  She has at least 2 admissions last month with multiple medical problems.  This time she was hospitalized on 30 June 2021.     Recently was hospitalized for C. difficile and transient pseudomonal bacteremia.  Patient's bacteremia actually improved prior to antibiotics but she was given a short course of treatment to help prevent any seeding of infection.  She was seen by ID during the stay.  She was discharged back to nursing home on 6/24.    She returned to the [facility] this time with temperature of 102 F noted in the nursing home and she was given Tylenol.  On arrival here she was altered temp of 100.1. She was unable to give history to ER provider and he was told from nursing home that she was having episodes of diarrhea.  During initial evaluation by the admitting hospitalist Dr. Bin Guy she was awake interactive.  She was alert oriented x2.   She still complained of intermittent right shoulder pain.  She has a known shoulder fracture.  She did not have any chest pain or shortness of breath.  She denied any abdominal pain or nausea.  No focal weakness.  In the ER blood cultures were drawn and she was given a dose of Merrem.  Stool studies also ordered.    730 AM  this morning developed cardiac arrest and CODE BLUE was called.  The code team arrived  there hands they bagged the patient with Ambu bag ventilation and chest compressions were initiated.  Family had a PEA..  We did a pulse check and remains no pulse.  She was asystole.  The advanced cardiac life support was continued for at least 10 minutes with return of spontaneous circulation.  Patient in the process of received 2 epinephrine,  sodium bicarbonate and calcium IV pushes.  Dr.Glen Aldridge attending hospitalist running the code eventually intubated the patient.  He needed use a bougie in the second attempt.  She was noted to have bleeding in endotracheal tube soon after placing the tube.    Apparently there was no active source of bleeding identified.  In further reviewing the history it appears patient has a right-sided large pleural effusion and to my knowledge no intervention has been done and pulmonary was never consulted before.   The pleural effusion was also noted in the follow-up chest x-ray after intubation.  Chest x-ray confirmed placement of ET tube.Patient was transferred to critical care unit bed for further management.    Post cardiac arrest stat labs showed she had hyponatremia with serum sodium of 129 potassium of 6.3 and she was scheduled for dialysis this afternoon and was taken for dialysis..  Reviewing the history further it appears patient was a smoker but there is no documented history of COPD apparently she had chronic ear problems and several surgeries done and she also has history of noncompliance to dialysis.  She did not have any recent pulmonary work-up done to my knowledge.  At the time of my visit patient was sedated with propofol drip and was tachypneic and overbreathing the ventilator.  Endotracheal tube has some bloody drainage noted.  Her hemoglobin was stable 13.4 g and she did not need any blood transfusion.  She was given some morphine for pain control but did not have the fentanyl drip started.  I talked with RN and RT about the respiratory status and  ventilator changes.  She was started on routine bronchodilator treatment.  She was noted to have elevated troponin after the cardiac arrest and cardiology has been consulted and they are following the patient.    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   has a past surgical history that includes  section; Cataract extraction w/  intraocular lens implant; Cholecystectomy; AV fistula placement; Myringotomy w/ tubes (Bilateral); Myringotomy w/ tubes (Right); and Tubal ligation.  Allergies   Allergen Reactions   • Bupropion Nausea Only   • Chantix [Varenicline] Nausea And Vomiting     Does not remember   • Gabapentin Hallucinations     hallucinations   • Azithromycin Rash   • Levofloxacin Rash   • Penicillins Rash   • Sulfa Antibiotics Rash     Medications  aspirin, 81 mg, Oral, Daily  atropine, 1 drop, Left Eye, Daily  brimonidine, 1 drop, Left Eye, BID  carvedilol, 6.25 mg, Oral, BID With Meals  cholestyramine, 1 packet, Oral, Q8H  famotidine, 20 mg, Oral, Daily  heparin (porcine), 5,000 Units, Subcutaneous, Q12H  insulin regular, 2-7 Units, Subcutaneous, TID With Meals  ipratropium-albuterol, 3 mL, Nebulization, 4x Daily - RT  methylPREDNISolone sodium succinate, 60 mg, Intravenous, Q8H  nicotine, 1 patch, Transdermal, Q24H  pravastatin, 10 mg, Oral, Nightly  saccharomyces boulardii, 500 mg, Oral, BID  sertraline, 25 mg, Oral, Daily  sodium chloride, 10 mL, Intravenous, Q12H  timolol, 1 drop, Left Eye, QAM  vancomycin, 125 mg, Oral, Q6H      propofol, 5-50 mcg/kg/min, Last Rate: 15 mcg/kg/min (21 0848)      Social History   reports that she has  been smoking cigarettes. She has a 12.00 pack-year smoking history. She has never used smokeless tobacco. She reports that she does not drink alcohol and does not use drugs.  Family History  family history includes Diabetes in her father; Heart disease in her mother.  Review of Systems:  Cannot obtain due to mechanical ventilated state  Physical Exam:  Temp:  [96.1 °F (35.6 °C)] 96.1 °F (35.6 °C)  Heart Rate:  [] 94  Resp:  [18-26] 26  BP: (136-156)/(64-88) 136/87  FiO2 (%):  [60 %-100 %] 60 %No intake or output data in the 24 hours ending 07/08/21 1621      07/06/21  1712 07/08/21  1221   Weight: 66.1 kg (145 lb 11.6 oz) 65.8 kg (145 lb)     SpO2 Percentage    07/08/21 1148 07/08/21 1510 07/08/21 1520   SpO2: 100% 99% 99%     Physical Exam  Vitals reviewed.   Constitutional:       General: She is not in acute distress.     Appearance: She is ill-appearing.      Comments: Middle-aged  female orally intubated on ventilator sedated.   HENT:      Head: Normocephalic and atraumatic.      Comments: Endotracheal tube in place 7.5 x 23 cm at lips.  There was bloody drainage noted in the endotracheal tube but no casey bleeding identified.     Nose: Nose normal. No congestion.      Mouth/Throat:      Mouth: Mucous membranes are dry.      Pharynx: Oropharynx is clear. No oropharyngeal exudate.   Eyes:      General: No scleral icterus.     Extraocular Movements: Extraocular movements intact.      Conjunctiva/sclera: Conjunctivae normal.      Pupils: Pupils are equal, round, and reactive to light.   Cardiovascular:      Rate and Rhythm: Regular rhythm. Tachycardia present.      Pulses: Normal pulses.      Heart sounds: Normal heart sounds. No murmur heard.   No gallop.    Pulmonary:      Effort: No respiratory distress.      Breath sounds: No stridor. Rales present. No wheezing.      Comments: Diminished air entry on the lungs on the right side.  Abdominal:      General: Abdomen is flat. Bowel sounds are normal.  There is no distension.      Palpations: Abdomen is soft. There is no mass.      Tenderness: There is no abdominal tenderness. There is no guarding.   Genitourinary:     Comments: Not examined  Musculoskeletal:         General: No swelling. Normal range of motion.      Cervical back: Normal range of motion and neck supple. No rigidity or tenderness.      Right lower leg: Edema present.      Left lower leg: Edema present.      Comments: Trace bilateral ankle edema noted.   Lymphadenopathy:      Cervical: No cervical adenopathy.   Skin:     General: Skin is warm and dry.      Capillary Refill: Capillary refill takes less than 2 seconds.      Coloration: Skin is not jaundiced or pale.      Findings: Bruising present. No lesion or rash.   Neurological:      General: No focal deficit present.      Mental Status: Mental status is at baseline.      Cranial Nerves: No cranial nerve deficit.      Motor: Weakness present.      Comments: Could not be assessed.   Psychiatric:      Comments: Could not be assessed.       Ventilator Settings:      Orally intubated with size 7.5 endotracheal tube 3 cm the lips.    Resp Rate (Set): 26     FiO2 (%): 60 %  PEEP/CPAP (cm H2O): 5 cm H20  Minute Ventilation (L/min) (Obs): 10.8 L/min  Resp Rate (Observed) Vent: 26  I:E Ratio (Set): 1:0.76  I:E Ratio (Obs): 1:1.20  PIP Observed (cm H2O): 20 cm H2O        Results from last 7 days   Lab Units 07/08/21  0830 07/03/21  0459 07/02/21  0440   WBC 10*3/mm3 10.66 4.80 6.40   HEMOGLOBIN g/dL 13.4 10.8* 10.0*   PLATELETS 10*3/mm3 199 198 188     Results from last 7 days   Lab Units 07/08/21  0946 07/07/21  0533 07/03/21  0459   SODIUM mmol/L 129* 132* 132*   POTASSIUM mmol/L 6.3* 5.0 4.8   CO2 mmol/L 19.0* 25.0 28.0   BUN mg/dL 29* 21 30*   CREATININE mg/dL 4.31* 3.69* 3.93*   GLUCOSE mg/dL 145* 98 122*     Results from last 7 days   Lab Units 07/08/21  1108   PH, ARTERIAL pH units 7.328*   PCO2, ARTERIAL mm Hg 43.4   PO2 ART mm Hg 172.0*   FIO2 %  100     No results found for: BLOODCX, URINECX, WOUNDCX, MRSACX, RESPCX, STOOLCX  Lab Results   Component Value Date    PROBNP 58,602.0 (H) 04/03/2021     Recent radiology:   Imaging Results (Last 72 Hours)     Procedure Component Value Units Date/Time    XR Chest 1 View [[id number]] Collected: 07/08/21 0817     Updated: 07/08/21 0829    Narrative:      EXAMINATION: XR CHEST 1 VW-  7/8/2021 8:17 AM CDT 1 view     HISTORY: code blue; R41.82-Altered mental status, unspecified;  A41.9-Sepsis, unspecified organism; O14-Axtwetg effusion, not elsewhere  classified; R13.10-Dysphagia, unspecified; Z78.9-Other specified health  status; Z74.09-Other reduced mobility     COMPARISON: 06/30/2021.     FINDINGS:   Endotracheal tube tip is seen projecting 3.9 cm above the herman.  Vascular stent is seen in the LEFT upper chest. There is redemonstration  of what appears to be a large RIGHT pleural effusion, similar to  06/30/2021. Pulmonary vascular congestion suspected bilaterally with  some mild interstitial markings at the periphery of the LEFT lung.      Extensive chronic posttraumatic changes to the proximal RIGHT humerus,  similar to the shoulder radiograph from 06/30/2021.          Impression:         1.  Very similar appearance of the chest when compared to the study from  06/30/2021. Large RIGHT pleural effusion.     2.  Findings of interstitial edema and pulmonary vascular congestion are  also again seen suggestive of mild/early changes of congestive heart  failure and pulmonary edema.     3.  Endotracheal tube as discussed above.        This report was finalized on 07/08/2021 08:20 by Dr. Gordo Le MD.        My radiograph interpretation/independent review of imaging: Agree with current interpretation.  Other test results (not lab or imaging): Results for orders placed during the hospital encounter of 06/30/21    Adult Transthoracic Echo Complete W/ Cont if Necessary Per Protocol    Interpretation Summary  · Left  ventricular systolic function is normal. Left ventricular ejection fraction appears to be 61 - 65%.  · Left ventricular wall thickness is consistent with mild concentric hypertrophy.  · The right ventricular cavity is borderline dilated. Borderline low right ventricular systolic function noted.  · Mild mitral valve regurgitation is present.  · Trace tricuspid valve regurgitation is present. Estimated right ventricular systolic pressure from tricuspid regurgitation is mildly elevated (35-45 mmHg).  · There is a right pleural effusion.  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   • Eustachian tube dysfunction   • Chronic otitis media   • Pneumonia of left upper lobe due to Streptococcus (CMS/HCC)   • End stage renal failure on dialysis (CMS/HCC)   • Diabetes (CMS/HCC)   • Glaucoma   • HTN (hypertension)   • Proteinuria   • DM2 (diabetes mellitus, type 2) (CMS/HCC)   • Chronic anemia   • Acute pulmonary edema (CMS/HCC)   • Non-compliance with renal dialysis (CMS/HCC)   • Elevated troponin due to ESRD    • Hyperkalemia   • Encephalopathy, metabolic, due to uremia   • High anion gap metabolic acidosis due to uremia   • Respiratory distress   • Acute diastolic heart failure (CMS/HCC)   • Lymph node enlargement, left axillary measuring 1.1 cm -follow-up for primary care   • Nausea vomiting and diarrhea   • Acute diastolic heart failure (CMS/HCC)   • Diabetes mellitus with ophthalmic complication (CMS/HCC)   • Tobacco abuse   • Urinary tract infection with hematuria   • Pneumonia due to infectious organism   • Abnormal urine findings   • Closed fracture of right proximal humerus   • Hyponatremia   • Hypothyroidism (acquired)   • Forehead laceration secondary to fall   • Heme positive stool   • Dialysis AV fistula malfunction, initial encounter  (CMS/HCC)   • Aspiration into airway   • Bronchospasm, acute   • Altered mental status   • Protein-calorie malnutrition (CMS/HCC)   • Pupil irregular   • Sepsis (CMS/HCC)   • Hypoglycemia   • Hyponatremia   • Diarrhea   • Hypokalemia   • C. difficile diarrhea   • Bacteremia due to Pseudomonas   • Fever   • Elevated troponin   • Fx humeral neck, right, with nonunion, subsequent encounter   • Pleural effusion on right     Pulmonary Assessment:  SEVERE ACUTE RESPIRATORY FAILURE REQUIRING MECHANICAL VENTILATION  This is a threat to life or pulmonary function, high risk, due to acute hypoxic respiratory failure s/p cardiac arrest and asystole requiring CPR and intubation.    New problem (to me), with additional workup planned: Large right-sided pleural effusion, COPD, history of tobacco abuse, possible chronic respiratory failure.  New problem (to me), no additional workup planned: Hypertension, diabetes mellitus, chronic kidney disease stage V dialysis dependent, altered mental status, hyponatremia and hyperkalemia, recent Pseudomonas bacteremia and sepsis and C. difficile colitis.  Elevated troponin.  Other problems either stable, failing to improve or worsenin. Acute hypoxic respiratory failure.  2. Intubation mechanical ventilation  3. Asystole and pulses electrical activity and cardiac arrest status post CPR  4. Questionable traumatic intubation with bleeding in the endotracheal tube  5. Large right-sided pleural effusion  6. Chronic kidney disease and end-stage renal disease stage V on hemodialysis  7. Elevated troponin after cardiac arrest most likely secondary to renal disease  8. Hypertension  9. Hyperlipidemia  10. Diabetes mellitus type 2  11. Chronic obstructive pulmonary disease likely with history of tobacco use  12. Encephalopathy and altered mental status  13. Recent history of Pseudomonas bacteremia and C. difficile colitis  14. History of noncompliance to treatment    Recommend/plan:   Ventilator  order set, including intravenous narcotics, benzodiazepines (that is controlled drugs) for sedation and ventilator tolerance  Chest X-ray in the morning tomorrow  Arterial blood gas analysis in the morning tomorrow  Additional plan:  · Patient had cardiopulmonary arrest in the floor most likely from electrolyte abnormality.  She was noted to have hyponatremia and hyperkalemia and is a known end-stage renal disease patient with hemodialysis dependence.  · He is already intubated and will remain on the ventilator and will try to wean her off when she is more stable.  Continue assist-control volume control ventilation and keep her sedated with propofol and start fentanyl drip if needed.  She is currently getting morphine for the pain control and air hunger.  · Continue current ventilator settings and continue treating PEEP and FiO2 as  tolerated.  She is empirically started on Solu-Medrol which could be discontinued.  I do not see any evidence of bronchospasm at this moment.  · Patient will be getting routine bronchodilator treatment and will need further work-up for underlying COPD when she is more stable.  Her endotracheal tube bleeding is controlled now and will do the frequent bronchial washing and lavage and will monitor the hemoglobin.  I do not believe she will need a bronchoscopy at this moment.  She already has a stable hemoglobin and doing well on the ventilator.  · She had a large pleural effusion on the right side and also history of Pseudomonas bacteremia and may have a right-sided pneumonia with chronic pleural effusion which could be empyema or complex management of confusion.  I discussed the chest x-ray finding and reviewed the old chest x-ray and CT scan with Dr. Jaden Frausto the cardiothoracic surgeon.  · Patient may need a thoracentesis at some point but as it is a chronic pleural effusion after thoracentesis she may have a trapped lung and further problems so we are holding off.  She does not appear  to be infected at this moment and she has normal white cell count and she is stable on the ventilator.  If she improves we will get a cardiothoracic surgery for further intervention with drainage of the pleural effusion and she may need further work-up.  · Hemodialysis be continued per nephrology.  Patient had a cardiac arrest and PEA in the medical floor and had a prolonged resuscitation and possible anoxic encephalopathy needs to be considered.  Patient will not have any active intervention at this moment to my knowledge and cardiology is already seeing her elevated troponin most likely from renal issues.  · Patient remains on ventilator tube feeding will be started.  She will need pain and anxiety control and DVT and stress ulcer prophylaxis needs to be addressed.  She will need daily labs and imaging studies while on ventilator.  · Patient will need her CODE STATUS to be addressed.  She has got multiple chronic medical problems with noncompliance and had a prolonged cardiac resuscitation with possibility of poor outcome.  She is still a full code at this moment.    · Her overall prognosis seems guarded to poor.  We will continue following her and make further recommendations.  · Time spent in seeing this patient is a pulmonary critical care consult in intensive care unit was 60 minutes.  30 minutes spent in coordinating the treatment plan and discussing the care plan with RN and RT and Dr. Frausto.    Thank you for this consult.  We will follow along.    Electronically signed by     Elvia Valadez MD   Pulmonologist/intensivist.    on 7/8/2021 at 16:21 CDT

## 2021-07-08 NOTE — PLAN OF CARE
Goal Outcome Evaluation:               Pt to ccu @ 0800 this morning post cardiac arrest on 3a.  Pt now intubated, responds only to painful stimuli.  PT now DNR with full interventions.

## 2021-07-08 NOTE — CONSULTS
[facility] HEART GROUP CONSULT NOTE    Referring Provider: Sundeep Aldridge    Reason for Consultation: elevated troponin    Chief Complaint   Patient presents with   • Fever   • Altered Mental Status       Subjective .     History of present illness:  Mini Gentile is a 61 y.o. female who was initially hospitalized last month with mental status, fever.  She was treated for C. difficile and bacteremia.  She was discharged back to the nursing home on 6/24/2021.  She presented back to the emergency room on 6/30/2021 with complaints of fever and altered mental status.  She has been hospitalized since that time.  She had been improving, she had been hemodynamically stable.  She was on oral occasions for treatment of C. difficile.  And reportedly nursing tells me was close to being discharged back to the skilled nursing facility when she came.  She does have end-stage renal disease and is a chronic hemodialysis patient monitored through [facility] kidney specialist.    Earlier this morning the monitor room called the nursing staff on the third floor due to significant bradycardia noted on the monitor (heart rate reported to be down to the 20s).  Nursing staff went into check on the patient who was unresponsive.    Successful ACLS protocol with ROSC.  She was transferred to the CCU.  She is intubated.  Cardiology was consulted due to abnormal labs, specifically an elevated troponin.      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    • Chronic otitis media    • Diabetes (CMS/[facility])    • End stage renal disease on dialysis (CMS/[facility])    • Eustachian tube dysfunction    • GERD (gastroesophageal reflux disease)    • Glaucoma    • HTN (hypertension)    • Hyperlipidemia    • Mucoid otitis media    • Noncompliance of patient with renal dialysis (CMS/[facility])    • Tobacco abuse    ,   Past Surgical History:   Procedure Laterality Date   • ARTERIOVENOUS FISTULA      • CATARACT EXTRACTION WITH INTRAOCULAR LENS IMPLANT     •  SECTION     • CHOLECYSTECTOMY     • MYRINGOTOMY W/ TUBES Bilateral    • MYRINGOTOMY W/ TUBES Right    • TUBAL ABDOMINAL LIGATION     ,   Family History   Problem Relation Age of Onset   • Heart disease Mother    • Diabetes Father    • Colon cancer Neg Hx    • Colon polyps Neg Hx    ,   Social History     Tobacco Use   • Smoking status: Current Every Day Smoker     Packs/day: 2.00     Years: 6.00     Pack years: 12.00     Types: Cigarettes   • Smokeless tobacco: Never Used   Vaping Use   • Vaping Use: Never used   Substance Use Topics   • Alcohol use: No   • Drug use: No   ,     Medications  Current Facility-Administered Medications   Medication Dose Route Frequency Provider Last Rate Last Admin   • acetaminophen (TYLENOL) tablet 650 mg  650 mg Oral Q4H PRN Chavo Mikeia K, APRN   650 mg at 21    Or   • acetaminophen (TYLENOL) suppository 650 mg  650 mg Rectal Q4H PRN WoeltSamantha montgomerySarahy K, APRN       • aspirin EC tablet 81 mg  81 mg Oral Daily DorothyeltChavo montgomeryia WINSTON, APRN   81 mg at 21 0840   • atropine 1 % ophthalmic solution 1 drop  1 drop Left Eye Daily Sarahy Mike, APRN   1 drop at 21 0845   • brimonidine (ALPHAGAN) 0.2 % ophthalmic solution 1 drop  1 drop Left Eye BID Sarahy Mike, APRN   1 drop at 21   • carvedilol (COREG) tablet 6.25 mg  6.25 mg Oral BID With Meals Sarahy Mike, APRN   6.25 mg at 21   • cholestyramine (QUESTRAN) packet 1 packet  1 packet Oral Q8H Sundeep Aldridge MD   1 packet at 21   • dextrose (D50W) 25 g/ 50mL Intravenous Solution 25 g  25 g Intravenous Q15 Min PRN Sarahy Mike K, APRN       • dextrose (GLUTOSE) oral gel 15 g  15 g Oral Q15 Min PRN Sarahy Mike K, APRN       • famotidine (PEPCID) tablet 20 mg  20 mg Oral Daily DorothyeltSarahy montgomery K, APRN   20 mg at 21 0841   • glucagon (human recombinant) (GLUCAGEN DIAGNOSTIC)  injection 1 mg  1 mg Subcutaneous Q15 Min PRN Sarahy Mike, APRN       • heparin (porcine) 5000 UNIT/ML injection 5,000 Units  5,000 Units Subcutaneous Q12H Sarahy Mike APRN   5,000 Units at 07/07/21 2030   • insulin regular (humuLIN R,novoLIN R) injection 2-7 Units  2-7 Units Subcutaneous TID With Meals Bin Guy DO   3 Units at 07/07/21 1741   • melatonin tablet 6 mg  6 mg Oral Nightly PRN Hang Giles MD   6 mg at 07/08/21 0022   • methylPREDNISolone sodium succinate (SOLU-Medrol) injection 125 mg  125 mg Intravenous Once Sundeep Aldridge MD       • methylPREDNISolone sodium succinate (SOLU-Medrol) injection 60 mg  60 mg Intravenous Q8H Sundeep Aldridge MD       • Morphine sulfate (PF) injection 2 mg  2 mg Intravenous Q2H PRN Elvia Valadez MD       • nicotine (NICODERM CQ) 21 MG/24HR patch 1 patch  1 patch Transdermal Q24H Sundeep Aldridge MD   1 patch at 07/04/21 2109   • ondansetron (ZOFRAN) injection 4 mg  4 mg Intravenous Q6H PRN Sarahy Mike APRN       • pravastatin (PRAVACHOL) tablet 10 mg  10 mg Oral Nightly Sarahy Mike APRN   10 mg at 07/07/21 2030   • propofol (DIPRIVAN) infusion 10 mg/mL 100 mL  5-50 mcg/kg/min Intravenous Titrated Sundeep Aldridge MD 5.95 mL/hr at 07/08/21 0848 15 mcg/kg/min at 07/08/21 0848   • saccharomyces boulardii (FLORASTOR) capsule 500 mg  500 mg Oral BID Sarahy Mike, APRN   500 mg at 07/07/21 2030   • sertraline (ZOLOFT) tablet 25 mg  25 mg Oral Daily Sarahy Mike, APRN   25 mg at 07/07/21 0841   • sodium chloride 0.9 % flush 10 mL  10 mL Intravenous PRN Sarahy Mike APRN       • sodium chloride 0.9 % flush 10 mL  10 mL Intravenous Q12H Sarahy Mike APRN   10 mL at 07/05/21 2017   • sodium chloride 0.9 % flush 10 mL  10 mL Intravenous PRN Sarahy Mike APRN       • timolol (TIMOPTIC) 0.5 % ophthalmic solution 1 drop  1 drop Left Eye QAM Sarahy Mike,  APRN   1 drop at 07/08/21 0619   • vancomycin oral solution 125 mg  125 mg Oral Q6H Sundeep Aldridge MD   125 mg at 07/08/21 0619       Allergies:  Bupropion, Chantix [varenicline], Gabapentin, Azithromycin, Levofloxacin, Penicillins, and Sulfa antibiotics    Review of Systems  Review of Systems   Unable to perform ROS: intubated       Objective     Physical Exam:  Patient Vitals for the past 24 hrs:   BP Temp Temp src Pulse Resp SpO2   07/08/21 1100 147/83 -- -- 78 -- 100 %   07/08/21 1000 136/87 -- -- 68 -- 100 %   07/08/21 0900 156/88 -- -- 75 -- 98 %   07/08/21 0752 -- -- -- (!) 123 -- 92 %   07/08/21 0037 147/64 96.1 °F (35.6 °C) Oral 63 18 96 %   07/07/21 1500 140/66 98.4 °F (36.9 °C) Oral 79 20 94 %     Vitals reviewed.   Constitutional:       Appearance: Well-developed and normal weight. Toxic-appearing and chronically ill-appearing.      Interventions: Intubated and restrained.   HENT:      Head: Normocephalic and atraumatic.   Pulmonary:      Effort: Intubated.      Breath sounds: Scattered Wheezing present. Diffuse Rhonchi present.   Cardiovascular:      Normal rate. Regular rhythm.   Edema:     Peripheral edema present.     Pretibial: bilateral trace edema of the pretibial area.     Ankle: bilateral trace edema of the ankle.  Musculoskeletal:      Cervical back: Normal range of motion and neck supple. Skin:     General: Skin is dry.         Results Review:   I reviewed the patient's new clinical results.    Lab Results (last 72 hours)     Procedure Component Value Units Date/Time    Blood Gas, Arterial - [[id number]]  (Abnormal) Collected: 07/08/21 1108    Specimen: Arterial Blood Updated: 07/08/21 1115     Site Right Radial     Rustam's Test Positive     pH, Arterial 7.328 pH units      Comment: 84 Value below reference range        pCO2, Arterial 43.4 mm Hg      pO2, Arterial 172.0 mm Hg      Comment: 83 Value above reference range        HCO3, Arterial 22.8 mmol/L      Base Excess, Arterial  -3.2 mmol/L      Comment: 84 Value below reference range        O2 Saturation, Arterial >100.1 %      Comment: 93 Value above reportable range > 100.1        Temperature 37.0 C      Barometric Pressure for Blood Gas 748 mmHg      Modality Ventilator     FIO2 100 %      Ventilator Mode AC     Set Tidal Volume 400     Set Mech Resp Rate 26.0     PEEP 5.0     Collected by [id number]     Comment: Meter: [id number]     :  [id number]        pCO2, Temperature Corrected 43.4 mm Hg      pH, Temp Corrected 7.328 pH Units      pO2, Temperature Corrected 172 mm Hg     POC Glucose Once [[id number]]  (Normal) Collected: 07/08/21 1103    Specimen: Blood Updated: 07/08/21 1115     Glucose 128 mg/dL      Comment: : [id number] Lilliana Black ID: [id number]       Comprehensive Metabolic Panel [[id number]]  (Abnormal) Collected: 07/08/21 0946    Specimen: Blood Updated: 07/08/21 1027     Glucose 145 mg/dL      BUN 29 mg/dL      Creatinine 4.31 mg/dL      Sodium 129 mmol/L      Potassium 6.3 mmol/L      Comment: Slight hemolysis detected by analyzer. Results may be affected.        Chloride 91 mmol/L      CO2 19.0 mmol/L      Calcium 9.7 mg/dL      Total Protein 6.6 g/dL      Albumin 4.00 g/dL      ALT (SGPT) 11 U/L      AST (SGOT) 30 U/L      Alkaline Phosphatase 288 U/L      Total Bilirubin 0.5 mg/dL      eGFR Non African Amer 10 mL/min/1.73      Comment: <15 Indicative of kidney failure.        eGFR   Amer --     Comment: <15 Indicative of kidney failure.        Globulin 2.6 gm/dL      A/G Ratio 1.5 g/dL      BUN/Creatinine Ratio 6.7     Anion Gap 19.0 mmol/L     Narrative:      GFR Normal >60  Chronic Kidney Disease <60  Kidney Failure <15      Troponin [[id number]]  (Abnormal) Collected: 07/08/21 0946    Specimen: Blood Updated: 07/08/21 1025     Troponin T 0.091 ng/mL     Narrative:      Troponin T Reference Range:  <= 0.03 ng/mL-   Negative for AMI  >0.03 ng/mL-     Abnormal for myocardial necrosis.   Clinicians would have to utilize clinical acumen, EKG, Troponin and serial changes to determine if it is an Acute Myocardial Infarction or myocardial injury due to an underlying chronic condition.       Results may be falsely decreased if patient taking Biotin.      CBC (No Diff) [[id number]]  (Abnormal) Collected: 07/08/21 0830    Specimen: Blood Updated: 07/08/21 0914     WBC 10.66 10*3/mm3      RBC 4.41 10*6/mm3      Hemoglobin 13.4 g/dL      Hematocrit 44.7 %      .4 fL      MCH 30.4 pg      MCHC 30.0 g/dL      RDW 17.9 %      RDW-SD 66.9 fl      MPV 10.1 fL      Platelets 199 10*3/mm3     aPTT [[id number]]  (Normal) Collected: 07/08/21 0843    Specimen: Blood Updated: 07/08/21 0902     PTT 32.6 seconds     Protime-INR [[id number]]  (Abnormal) Collected: 07/08/21 0843    Specimen: Blood Updated: 07/08/21 0902     Protime 15.4 Seconds      INR 1.32    POC Glucose Once [[id number]]  (Abnormal) Collected: 07/08/21 0740    Specimen: Blood Updated: 07/08/21 0754     Glucose 157 mg/dL      Comment: : [id number] Messina LoriMeter ID: [id number]       POC Glucose Once [[id number]]  (Abnormal) Collected: 07/07/21 2000    Specimen: Blood Updated: 07/07/21 2011     Glucose 207 mg/dL      Comment: : [id number] Dufault MariahMeter ID: [id number]       POC Glucose Once [[id number]]  (Abnormal) Collected: 07/07/21 1715    Specimen: Blood Updated: 07/07/21 1726     Glucose 223 mg/dL      Comment: : [id number] Escalante KierstonMeter ID: [id number]       POC Glucose Once [[id number]]  (Abnormal) Collected: 07/07/21 1236    Specimen: Blood Updated: 07/07/21 1257     Glucose 153 mg/dL      Comment: : [id number] Hoda LindsayMeter ID: [id number]       POC Glucose Once [[id number]]  (Normal) Collected: 07/07/21 0840    Specimen: Blood Updated: 07/07/21 0850     Glucose 125 mg/dL      Comment: : [id number] [facility]: [id number]       Basic Metabolic Panel [[id number]]  (Abnormal) Collected: 07/07/21  0533    Specimen: Blood Updated: 07/07/21 0636     Glucose 98 mg/dL      BUN 21 mg/dL      Creatinine 3.69 mg/dL      Sodium 132 mmol/L      Potassium 5.0 mmol/L      Chloride 95 mmol/L      CO2 25.0 mmol/L      Calcium 9.1 mg/dL      eGFR   Amer --     Comment: <15 Indicative of kidney failure.        eGFR Non African Amer 12 mL/min/1.73      Comment: <15 Indicative of kidney failure.        BUN/Creatinine Ratio 5.7     Anion Gap 12.0 mmol/L     Narrative:      GFR Normal >60  Chronic Kidney Disease <60  Kidney Failure <15      POC Glucose Once [[id number]]  (Abnormal) Collected: 07/06/21 1933    Specimen: Blood Updated: 07/06/21 1944     Glucose 226 mg/dL      Comment: : [id number] [employer] System[employer]er ID: [id number]       POC Glucose Once [[id number]]  (Abnormal) Collected: 07/06/21 1649    Specimen: Blood Updated: 07/06/21 1700     Glucose 320 mg/dL      Comment: : BETHANIE Calvert ID: [id number]       POC Glucose Once [[id number]]  (Abnormal) Collected: 07/06/21 1404    Specimen: Blood Updated: 07/06/21 1425     Glucose 288 mg/dL      Comment: : BETHANIE Calvert ID: [id number]       POC Glucose Once [[id number]]  (Abnormal) Collected: 07/06/21 1003    Specimen: Blood Updated: 07/06/21 1017     Glucose 185 mg/dL      Comment: : BETHANIE Calvert ID: [id number]       POC Glucose Once [[id number]]  (Abnormal) Collected: 07/05/21 2015    Specimen: Blood Updated: 07/05/21 2026     Glucose 211 mg/dL      Comment: : [id number] [employer] System[employer] ID: [id number]       POC Glucose Once [[id number]]  (Abnormal) Collected: 07/05/21 1723    Specimen: Blood Updated: 07/05/21 1734     Glucose 149 mg/dL      Comment: : [id number] Pyle ID: [id number]             Lab Results   Component Value Date    ECHOEFEST 55 01/20/2020       Imaging Results (Last 72 Hours)     Procedure Component Value Units Date/Time    XR Chest 1 View [[id number]]  Collected: 07/08/21 0817     Updated: 07/08/21 0829    Narrative:      EXAMINATION: XR CHEST 1 VW-  7/8/2021 8:17 AM CDT 1 view     HISTORY: code blue; R41.82-Altered mental status, unspecified;  A41.9-Sepsis, unspecified organism; O13-Hmppwnd effusion, not elsewhere  classified; R13.10-Dysphagia, unspecified; Z78.9-Other specified health  status; Z74.09-Other reduced mobility     COMPARISON: 06/30/2021.     FINDINGS:   Endotracheal tube tip is seen projecting 3.9 cm above the herman.  Vascular stent is seen in the LEFT upper chest. There is redemonstration  of what appears to be a large RIGHT pleural effusion, similar to  06/30/2021. Pulmonary vascular congestion suspected bilaterally with  some mild interstitial markings at the periphery of the LEFT lung.      Extensive chronic posttraumatic changes to the proximal RIGHT humerus,  similar to the shoulder radiograph from 06/30/2021.          Impression:         1.  Very similar appearance of the chest when compared to the study from  06/30/2021. Large RIGHT pleural effusion.     2.  Findings of interstitial edema and pulmonary vascular congestion are  also again seen suggestive of mild/early changes of congestive heart  failure and pulmonary edema.     3.  Endotracheal tube as discussed above.        This report was finalized on 07/08/2021 08:20 by Dr. Gordo Le MD.        Assessment/Plan    1. Status post PEA arrest: Patient had a bradycardic episode on telemetry, monitoring notified nursing, nursing checked on the patient was found to be unresponsive. CODE BLUE was called. CPR was initiated, ACLS protocol was started 0738, ROSC achieved. Code ended 0800.    2. Elevated troponin: The patient was intubated during the time of cardiopulmonary resuscitation. Prior to this she had not complained of chest pain, per nursing notes in the chart at least. Troponin was checked after code was completed and found to be elevated. She did receive CPR during that time. At  this time would consider her elevated troponin to be secondary to recent CPR resuscitation.    3. End-stage renal disease: The patient is a hemodialysis patient and is due for hemodialysis today. Her creatinine is 4.31, potassium 6.3. She appears volume overloaded on exam.    4. C. Difficile: She had been hospitalized prior to her arrest for treatment of C. difficile and pseudomonal bacteria.    5.  Hypertension: stable    6.  DM: chronic, stable    The patient's family has changed her CODE STATUS to no CPR with full interventions.          No further cardiac testing at this time.  We can follow-up after the results of the echocardiogram are available.  Further recommendations to follow by Dr. Rasheed.          Electronically signed by ANTHONY Vega, 07/08/21, 11:48 AM CDT.      Please note this cardiology consultation note is the result of a face to face consultation with the patient, in addition to reviewing medical records at length by myself, ANTHONY Vega.       Time: 30 minutes

## 2021-07-08 NOTE — PROGRESS NOTES
It was around 730 this morning when I heard CODE BLUE called overhead in the room 335.  I ran to the patient's room and found nurses had initiated resuscitation effort.  She is being bagged and chest compressions were initiated.  She was lifeless when I saw her.  We did a pulse check and remains no pulse.  She was asystole.  We continued the resuscitation effort for about 10 minutes until we got a return of spontaneous circulation.  Patient in the process of received 2 epinephrine,  sodium bicarbonate and calcium IV pushes.  I also intubated the patient.  I had to use a bougie in the second attempt.  I encountered some bleeding soon after placing the tube.  I did not identify any gross active source of bleeding.  Before blood came to the colorimeter, appropriate change in color suggests patient was appropriately intubated.  There was bilateral breath sounds but right is decrease compared to left.  Patient has known right pleural effusion.  Chest x-ray confirmed placement of ET tube.  Patient was transferred to critical care unit bed for further management.  A separate post resuscitation note has been written after she was settled in CCU 5.

## 2021-07-08 NOTE — NURSING NOTE
"Received report from Yoana ABAD on this patient, at time of report, patient was saying \"I'm cold\" and had hit her call light.  Blankets were given and she stated \"oh, thank you\" to this nurse.  At 7:37 am, per charge nurse Rebecca ABAD, she was called by monitor room and told patient had no pulse on monitor. She went to room and found patient unresponsive at this time, called a code blue and began CPR.  Code team arrived - see their notes- and patient was transferred to ICU at this time. Verbal report given to Kevyn Granados RN by this nurse.  "

## 2021-07-08 NOTE — NURSING NOTE
After checking on the patient who was still alert, and talking during handoff. Charge nurse was notified by monitor room. Charge nurse entered the room to find the patient unresponsive, code blue was called.

## 2021-07-08 NOTE — PAYOR COMM NOTE
"[id number]  7/8/21 CODE BLUE  --NOW ON VENT  UR  [phone]    Luana Peña (61 y.o. Female)     Date of Birth Social Security Number Address Home Phone MRN    1960  [address] [place] [id number] [phone] [id number]    Denominational Marital Status          Other Single       Admission Date Admission Type Admitting Provider Attending Provider Department, Room/Bed    6/30/21 Emergency Sundeep Aldridge MD [place]ano, Glenn Riego, MD [facility] CARDIAC CARE, C005/1    Discharge Date Discharge Disposition Discharge Destination                       Attending Provider: Sundeep Aldridge MD    Allergies: Bupropion, Chantix [Varenicline], Gabapentin, Azithromycin, Levofloxacin, Penicillins, Sulfa Antibiotics    Isolation: Spore   Infection: C.difficile (06/17/21), COVID Screen (preop/placement) (07/06/21)   Code Status: No CPR    Ht: 147.3 cm (57.99\")   Wt: 66.1 kg (145 lb 11.6 oz)    Admission Cmt: None   Principal Problem: Altered mental status [R41.82]                 Active Insurance as of 6/30/2021     Primary Coverage     Payor Plan Insurance Group Employer/Plan Group    [facility] WELL[employer]ARE MEDICARE REPLACEMENT      Payor Plan Address Payor Plan Phone Number Payor Plan Fax Number Effective Dates    [address] [address] [phone]  4/1/2021 - None Entered    [facility] [id number]       Subscriber Name Subscriber Birth Date Member ID       LUANA PEÑA 1960 [id number]           Secondary Coverage     Payor Plan Insurance Group Employer/Plan Group    WELLCARE [facility] WELLCARE MEDICAID      Payor Plan Address Payor Plan Phone Number Payor Plan Fax Number Effective Dates    [address] [address] [phone]  11/4/2016 - None Entered    [facility] [id number]       Subscriber Name Subscriber Birth Date Member ID       LUANA PEÑA MICHAEL 1960 [id number]                 Emergency Contacts      (Rel.) Home Phone Work Phone Mobile " Phone    Erica Bowling (Other) [phone] -- [phone]    Jose Bowling (Significant Other) [phone] -- [phone]    Arlette Barker (Daughter) -- -- [phone]              Current Facility-Administered Medications   Medication Dose Route Frequency Provider Last Rate Last Admin   • acetaminophen (TYLENOL) tablet 650 mg  650 mg Oral Q4H PRN Woeltulises, Sarahy K, APRN   650 mg at 07/07/21 1741    Or   • acetaminophen (TYLENOL) suppository 650 mg  650 mg Rectal Q4H PRN Woeltulises, Sarahy K, APRN       • aspirin EC tablet 81 mg  81 mg Oral Daily WoeltChavo montgomeryia K, APRN   81 mg at 07/07/21 0840   • atropine 1 % ophthalmic solution 1 drop  1 drop Left Eye Daily WoeltSarahy montgomery, APRN   1 drop at 07/07/21 0845   • brimonidine (ALPHAGAN) 0.2 % ophthalmic solution 1 drop  1 drop Left Eye BID WoeltSarahy montgomery, APRN   1 drop at 07/07/21 2031   • carvedilol (COREG) tablet 6.25 mg  6.25 mg Oral BID With Meals WoSarahy carmen APRN   6.25 mg at 07/07/21 1741   • cholestyramine (QUESTRAN) packet 1 packet  1 packet Oral Q8H Sundeep Aldridge MD   1 packet at 07/07/21 2029   • dextrose (D50W) 25 g/ 50mL Intravenous Solution 25 g  25 g Intravenous Q15 Min PRN Sarahy Mike K, APRN       • dextrose (GLUTOSE) oral gel 15 g  15 g Oral Q15 Min PRN WoeltSamantha montgomerySarahy K, APRN       • famotidine (PEPCID) tablet 20 mg  20 mg Oral Daily WoeltSarahy montgomery K, APRN   20 mg at 07/07/21 0841   • glucagon (human recombinant) (GLUCAGEN DIAGNOSTIC) injection 1 mg  1 mg Subcutaneous Q15 Min PRN Woeltulises, Sarahy K, APRN       • heparin (porcine) 5000 UNIT/ML injection 5,000 Units  5,000 Units Subcutaneous Q12H Sarahy Mike, APRN   5,000 Units at 07/07/21 2030   • insulin regular (humuLIN R,novoLIN R) injection 2-7 Units  2-7 Units Subcutaneous TID With Meals Bin Guy DO   3 Units at 07/07/21 1741   • melatonin tablet 6 mg  6 mg Oral Nightly PRN Hang Giles MD   6 mg at 07/08/21 0022   •  methylPREDNISolone sodium succinate (SOLU-Medrol) injection 125 mg  125 mg Intravenous Once Sundeep Aldridge MD       • methylPREDNISolone sodium succinate (SOLU-Medrol) injection 60 mg  60 mg Intravenous Q8H Sundeep Aldridge MD       • Morphine sulfate (PF) injection 2 mg  2 mg Intravenous Q2H PRN Elvia Valadez MD       • nicotine (NICODERM CQ) 21 MG/24HR patch 1 patch  1 patch Transdermal Q24H Sundeep Aldridge MD   1 patch at 07/04/21 2109   • ondansetron (ZOFRAN) injection 4 mg  4 mg Intravenous Q6H PRN Sarahy Mike APRN       • pravastatin (PRAVACHOL) tablet 10 mg  10 mg Oral Nightly Sarahy Mike APRN   10 mg at 07/07/21 2030   • propofol (DIPRIVAN) infusion 10 mg/mL 100 mL  5-50 mcg/kg/min Intravenous Titrated Sundeep Aldridge MD 5.95 mL/hr at 07/08/21 0848 15 mcg/kg/min at 07/08/21 0848   • saccharomyces boulardii (FLORASTOR) capsule 500 mg  500 mg Oral BID Sarahy Mike, APRN   500 mg at 07/07/21 2030   • sertraline (ZOLOFT) tablet 25 mg  25 mg Oral Daily Sarahy Mike, APRN   25 mg at 07/07/21 0841   • sodium chloride 0.9 % flush 10 mL  10 mL Intravenous PRN Sarahy Mike APRN       • sodium chloride 0.9 % flush 10 mL  10 mL Intravenous Q12H Sarahy Mike, APRN   10 mL at 07/05/21 2017   • sodium chloride 0.9 % flush 10 mL  10 mL Intravenous PRN Sarahy Mike APRN       • timolol (TIMOPTIC) 0.5 % ophthalmic solution 1 drop  1 drop Left Eye QAM Sarahy Mike APRAC   1 drop at 07/08/21 0619   • vancomycin oral solution 125 mg  125 mg Oral Q6H Sundeep Aldridge MD   125 mg at 07/08/21 0619     Orders (last 24 hrs)      Start     Ordered    07/09/21 0600  CBC (No Diff)  Morning Draw      07/08/21 0830    07/09/21 0600  Comprehensive Metabolic Panel  Morning Draw      07/08/21 0830    07/08/21 1600  methylPREDNISolone sodium succinate (SOLU-Medrol) injection 60 mg  Every 8 Hours      07/08/21 0818    07/08/21 1054  Code  Status and Medical Interventions:  Continuous      07/08/21 1054    07/08/21 1046  Inpatient Cardiology Consult  Once     Specialty:  Cardiology  Provider:  Bin Rasheed MD    07/08/21 1045    07/08/21 1030  Morphine sulfate (PF) injection 2 mg  Every 2 Hours PRN      07/08/21 1031    07/08/21 1030  Code Status and Medical Interventions:  Continuous,   Status:  Canceled      07/08/21 1030    07/08/21 0930  propofol (DIPRIVAN) infusion 10 mg/mL 100 mL  Titrated      07/08/21 0841    07/08/21 0915  methylPREDNISolone sodium succinate (SOLU-Medrol) injection 125 mg  Once      07/08/21 0818    07/08/21 0907  Comprehensive Metabolic Panel  STAT      07/08/21 0812    07/08/21 0832  Troponin  Now Then Every 6 Hours      07/08/21 0831    07/08/21 0832  Intubation  Once     Comments: This order was created via procedure documentation    07/08/21 0831    07/08/21 0831  Protime-INR  STAT      07/08/21 0830    07/08/21 0831  aPTT  STAT      07/08/21 0830    07/08/21 0829  Inpatient Pulmonology Consult  Once     Specialty:  Pulmonary Disease  Provider:  Elvia Valadez MD    07/08/21 0829    07/08/21 0821  Adult Transthoracic Echo Complete W/ Cont if Necessary Per Protocol  Once      07/08/21 0821    07/08/21 0819  Type & Screen  Once      07/08/21 0818    07/08/21 0818  ECG 12 Lead  STAT      07/08/21 0818    07/08/21 0813  CBC (No Diff)  STAT      07/08/21 0812    07/08/21 0805  XR Chest 1 View  1 Time Imaging      07/08/21 0805    07/08/21 0755  POC Glucose Once  Once      07/08/21 0740    07/08/21 0746  calcium chloride injection  Code / Trauma / Sedation Medication      07/08/21 0852    07/08/21 0744  EPINEPHrine (ADRENALIN) injection  Code / Trauma / Sedation Medication      07/08/21 0845    07/08/21 0744  sodium bicarbonate injection 8.4%  Code / Trauma / Sedation Medication      07/08/21 0847    07/07/21 2012  POC Glucose Once  Once      07/07/21 2000 07/07/21 1727  POC Glucose Once  Once      07/07/21  1715    07/07/21 1400  cholestyramine (QUESTRAN) packet 1 packet  Every 8 Hours Scheduled      07/07/21 0811    07/07/21 1258  POC Glucose Once  Once      07/07/21 1236    07/06/21 2055  melatonin tablet 6 mg  Nightly PRN      07/06/21 2055 07/06/21 1013  Pharmacy Consult  Continuous PRN,   Status:  Discontinued      07/06/21 1014    07/06/21 0656  albumin human 25 % IV SOLN 12.5 g  As Needed      07/06/21 0656    07/03/21 2000  Vital Signs Every Hour and Per Hospital Policy Based on Patient Condition  Every 4 Hours      07/03/21 1959 07/03/21 1800  insulin regular (humuLIN R,novoLIN R) injection 2-7 Units  3 Times Daily With Meals      07/03/21 1450    07/03/21 1700  POC Glucose 4x Daily AC & at Bedtime  4 Times Daily Before Meals & at Bedtime      07/03/21 1450    07/02/21 1200  saccharomyces boulardii (FLORASTOR) capsule 500 mg  2 Times Daily      07/02/21 1104    07/01/21 2215  nicotine (NICODERM CQ) 21 MG/24HR patch 1 patch  Every 24 Hours Scheduled      07/01/21 2200    07/01/21 0900  sertraline (ZOLOFT) tablet 25 mg  Daily      06/30/21 1617    07/01/21 0900  aspirin EC tablet 81 mg  Daily      06/30/21 1617    07/01/21 0900  atropine 1 % ophthalmic solution 1 drop  Daily      06/30/21 1617    07/01/21 0700  timolol (TIMOPTIC) 0.5 % ophthalmic solution 1 drop  Every Morning      06/30/21 1617    06/30/21 2100  pravastatin (PRAVACHOL) tablet 10 mg  Nightly      06/30/21 1617    06/30/21 2100  brimonidine (ALPHAGAN) 0.2 % ophthalmic solution 1 drop  2 Times Daily      06/30/21 1617    06/30/21 2100  sodium chloride 0.9 % flush 10 mL  Every 12 Hours Scheduled      06/30/21 1619 06/30/21 2100  heparin (porcine) 5000 UNIT/ML injection 5,000 Units  Every 12 Hours Scheduled      06/30/21 1619 06/30/21 1800  carvedilol (COREG) tablet 6.25 mg  2 Times Daily With Meals      06/30/21 1617    06/30/21 1800  vancomycin oral solution 125 mg  Every 6 Hours Scheduled      06/30/21 1620    06/30/21 9353   famotidine (PEPCID) tablet 20 mg  Daily      06/30/21 1617    06/30/21 1709  dextrose (GLUTOSE) oral gel 15 g  Every 15 Minutes PRN      06/30/21 1709    06/30/21 1709  dextrose (D50W) 25 g/ 50mL Intravenous Solution 25 g  Every 15 Minutes PRN      06/30/21 1709    06/30/21 1709  glucagon (human recombinant) (GLUCAGEN DIAGNOSTIC) injection 1 mg  Every 15 Minutes PRN      06/30/21 1709    06/30/21 1700  Intake and Output  Every Hour      06/30/21 1619    06/30/21 1620  Daily Weights  Daily      06/30/21 1619    06/30/21 1618  ondansetron (ZOFRAN) injection 4 mg  Every 6 Hours PRN      06/30/21 1619    06/30/21 1618  acetaminophen (TYLENOL) tablet 650 mg  Every 4 Hours PRN      06/30/21 1619    06/30/21 1618  acetaminophen (TYLENOL) suppository 650 mg  Every 4 Hours PRN      06/30/21 1619    06/30/21 1617  sodium chloride 0.9 % flush 10 mL  As Needed      06/30/21 1619    06/30/21 0959  sodium chloride 0.9 % flush 10 mL  As Needed      06/30/21 0959    --  SCANNED - TELEMETRY        06/30/21 0000    Pending  NPO Diet  Diet Effective Now     Comments: Should remain in effect until a complete SLP evaluation occurs and a superceding MD order is received.    Pending    Pending  SLP Consult: Eval & Treat RN Dysphagia Screen Failed  Once      Pending    --  SCANNED EKG      06/30/21 0000    Signed and Held  sodium chloride 0.9 % bolus 100 mL  As Needed      Signed and Held    Signed and Held  albumin human 25 % IV SOLN 12.5 g  As Needed      Signed and Held    --  sevelamer (RENAGEL) 800 MG tablet  3 Times Daily With Meals      07/02/21 0937    --  aspirin 81 MG chewable tablet  Daily      07/02/21 0937    --  cholecalciferol (VITAMIN D3) 25 MCG (1000 UT) tablet  Daily      07/02/21 0937    --  SCANNED - TELEMETRY        06/30/21 0000                Ventilator/Non-Invasive Ventilation Settings (From admission, onward) Comment    None           Operative/Procedure Notes (all)      Sundeep Aldridge MD at 07/08/21  0831  Version 1 of 1     Procedure Orders    1. Intubation [[id number]] ordered by Sundeep Aldridge MD           Post-procedure Diagnoses    1. Cardiorespiratory arrest (CMS/HCC) [I46.9]             Intubation    Date/Time: 7/8/2021 8:31 AM  Performed by: Sundeep Aldridge MD  Authorized by: Sundeep Aldridge MD   Indications: cardirespiratory arrest.  Intubation method: video-assisted  Patient status: lethargic/unresponsive.  Laryngoscope size: Mac 3  Tube size: 7.5 mm  Tube type: cuffed  Number of attempts: 2  Cricoid pressure: no  Cords visualized: yes  Post-procedure assessment: ETCO2 monitor  Breath sounds: reduced on right  Cuff inflated: yes  ETT to lip: 23 cm  Tube secured with: ETT guevara  Chest x-ray interpreted by me.  Chest x-ray findings: endotracheal tube in appropriate position (readjusted to 22 on the lip and confirmed on cxr; presence of right pleural effusion noted)  Comments: Hemoptysis encountered during the process. Unclear source of bleeding.  Patient suctioned.  No significant blood comes out after suctioning.  Patient is easily bagged thereafter.            Electronically signed by Sundeep Aldridge MD at 07/08/21 0835          Physician Progress Notes (last 24 hours) (Notes from 07/07/21 1059 through 07/08/21 1059)      Sundeep Aldridge MD at 07/08/21 0937        It was around 730 this morning when I heard CODE BLUE called overhead in the room 335.  I ran to the patient's room and found nurses had initiated resuscitation effort.  She is being bagged and chest compressions were initiated.  She was lifeless when I saw her.  We did a pulse check and remains no pulse.  She was asystole.  We continued the resuscitation effort for about 10 minutes until we got a return of spontaneous circulation.  Patient in the process of received 2 epinephrine,  sodium bicarbonate and calcium IV pushes.  I also intubated the patient.  I had to use a bougie in the second  attempt.  I encountered some bleeding soon after placing the tube.  I did not identify any gross active source of bleeding.  Before blood came to the colorimeter, appropriate change in color suggests patient was appropriately intubated.  There was bilateral breath sounds but right is decrease compared to left.  Patient has known right pleural effusion.  Chest x-ray confirmed placement of ET tube.  Patient was transferred to critical care unit bed for further management.  A separate post resuscitation note has been written after she was settled in CCU 5.      Electronically signed by Sundeep Aldridge MD at 07/08/21 0943     Gomez Lemos APRN at 07/08/21 0813          Nephrology ([facility]) Progress Note      Patient:  Mini Gentile  YOB: 1960  Date of Service: 7/8/2021  MRN: [id number]   Acct: [id number]   Primary Care Physician: Bharati Dahl APRN  Advance Directive:   Code Status and Medical Interventions:   Ordered at: 06/30/21 1620     Level Of Support Discussed With:    Patient     Code Status:    CPR     Medical Interventions (Level of Support Prior to Arrest):    Full     Admit Date: 6/30/2021       Hospital Day: 8  Referring Provider: No ref. provider found      Patient personally seen and examined.  Complete chart including Consults, Notes, Operative Reports, Labs, Cardiology, and Radiology studies reviewed as able.        Subjective:  Mini Gentile is a 61 y.o. female  whom we were consulted for ESRD.  On maintenance hemodialysis at [facility] on Tuesday Thursday Saturday.  She was recently hospitalized for C.Diff and pseudomonal bacteremia.  She was discharged back to SNF on 6/24/21.  She then developed a fever of 102, diarrhea, altered mental status, and right shoulder pain (has a known history of right shoulder fracture). Patient was first admitted to CCU but was then transferred to the floor. Has been tolerating HD  well.    This morning she had unwitnessed cardiac arrest, ROSC was achieved and she is seen just as she arrives to CCU. Intubated, unresponsive, resuscitation/stabilization efforts are ongoing.    Allergies:  Bupropion, Chantix [varenicline], Gabapentin, Azithromycin, Levofloxacin, Penicillins, and Sulfa antibiotics    Home Meds:  Medications Prior to Admission   Medication Sig Dispense Refill Last Dose   • aspirin 81 MG chewable tablet Chew 81 mg Daily.   6/29/2021 at Unknown time   • atropine 1 % ophthalmic solution Administer 1 drop into the left eye Daily.   6/29/2021 at Unknown time   • brimonidine (ALPHAGAN) 0.2 % ophthalmic solution Administer 1 drop into the left eye 2 (two) times a day.   6/29/2021 at Unknown time   • carvedilol (COREG) 6.25 MG tablet Take 6.25 mg by mouth 2 (Two) Times a Day With Meals.   6/29/2021 at Unknown time   • cholecalciferol (VITAMIN D3) 25 MCG (1000 UT) tablet Take 2,000 Units by mouth Daily.   6/29/2021 at Unknown time   • dorzolamide (TRUSOPT) 2 % ophthalmic solution Administer 1 drop into the left eye 2 (Two) Times a Day.   6/29/2021 at Unknown time   • famotidine (PEPCID) 20 MG tablet Take 1 tablet by mouth Daily.   6/29/2021 at Unknown time   • Ferric Citrate 1  MG(Fe) tablet Take 1 tablet by mouth 3 (Three) Times a Day With Meals.   6/29/2021 at Unknown time   • fluticasone (FLONASE) 50 MCG/ACT nasal spray 2 sprays into the nostril(s) as directed by provider 2 (Two) Times a Day.   6/29/2021 at Unknown time   • lactobacillus acidophilus (RISAQUAD) capsule capsule Take 1 capsule by mouth Daily. 30 capsule 2 6/29/2021 at Unknown time   • ondansetron (ZOFRAN) 4 MG tablet Take 1 tablet by mouth Every 6 (Six) Hours As Needed for Nausea or Vomiting. 24 tablet 2 6/29/2021 at Unknown time   • sertraline (ZOLOFT) 25 MG tablet Take 1 tablet by mouth Daily.   6/29/2021 at Unknown time   • sevelamer (RENAGEL) 800 MG tablet Take 800 mg by mouth 3 (Three) Times a Day With Meals.    2021 at Unknown time   • timolol (TIMOPTIC) 0.5 % ophthalmic solution Administer 1 drop into the left eye Every Morning.  12 2021 at Unknown time   • acetaminophen (TYLENOL) 325 MG tablet Take 650 mg by mouth Every 4 (Four) Hours As Needed for Mild Pain  or Fever.   Unknown at Unknown time   • ipratropium-albuterol (DUO-NEB) 0.5-2.5 mg/3 ml nebulizer Take 3 mL by nebulization Every 6 (Six) Hours As Needed for Wheezing or Shortness of Air.   Unknown at Unknown time   • [] vancomycin 50 MG/ML reconstituted solution oral solution reconstituted Take 2.5 mL by mouth Every 6 (Six) Hours for 18 doses. Indications: Clostridium Difficile Infection 45 mL 0        Medicines:  Current Facility-Administered Medications   Medication Dose Route Frequency Provider Last Rate Last Admin   • acetaminophen (TYLENOL) tablet 650 mg  650 mg Oral Q4H PRN Sarahy Mike K, APRN   650 mg at 21    Or   • acetaminophen (TYLENOL) suppository 650 mg  650 mg Rectal Q4H PRN Sarahy Mike K, APRN       • aspirin EC tablet 81 mg  81 mg Oral Daily Sarahy Mike, APRN   81 mg at 21 0840   • atropine 1 % ophthalmic solution 1 drop  1 drop Left Eye Daily Sarahy Mike APRN   1 drop at 21 0845   • brimonidine (ALPHAGAN) 0.2 % ophthalmic solution 1 drop  1 drop Left Eye BID Sarahy Mike, APRN   1 drop at 21   • carvedilol (COREG) tablet 6.25 mg  6.25 mg Oral BID With Meals Sarahy Mike, APRN   6.25 mg at 21   • cholestyramine (QUESTRAN) packet 1 packet  1 packet Oral Q8H Sundeep Aldridge MD   1 packet at 21   • dextrose (D50W) 25 g/ 50mL Intravenous Solution 25 g  25 g Intravenous Q15 Min PRN Sarahy Mike K, APRN       • dextrose (GLUTOSE) oral gel 15 g  15 g Oral Q15 Min PRN Sarahy Mike, APRN       • famotidine (PEPCID) tablet 20 mg  20 mg Oral Daily Sarahy Mike APRN   20 mg at 21 0841   • glucagon (human recombinant)  (GLUCAGEN DIAGNOSTIC) injection 1 mg  1 mg Subcutaneous Q15 Min PRN Sarahy Mike, APRN       • heparin (porcine) 5000 UNIT/ML injection 5,000 Units  5,000 Units Subcutaneous Q12H Sarahy Mike APRN   5,000 Units at 07/07/21 2030   • insulin regular (humuLIN R,novoLIN R) injection 2-7 Units  2-7 Units Subcutaneous TID With Meals Bin Guy, DO   3 Units at 07/07/21 1741   • melatonin tablet 6 mg  6 mg Oral Nightly PRN Hang Giles MD   6 mg at 07/08/21 0022   • nicotine (NICODERM CQ) 21 MG/24HR patch 1 patch  1 patch Transdermal Q24H Sundeep Aldridge MD   1 patch at 07/04/21 2109   • ondansetron (ZOFRAN) injection 4 mg  4 mg Intravenous Q6H PRN Sarahy Mike APRAC       • Pharmacy Consult   Does not apply Continuous PRN Sundeep Aldridge MD       • pravastatin (PRAVACHOL) tablet 10 mg  10 mg Oral Nightly Sarahy Mike APRN   10 mg at 07/07/21 2030   • saccharomyces boulardii (FLORASTOR) capsule 500 mg  500 mg Oral BID Sarahy Mike APRN   500 mg at 07/07/21 2030   • sertraline (ZOLOFT) tablet 25 mg  25 mg Oral Daily Sarahy Mike APRN   25 mg at 07/07/21 0841   • sodium chloride 0.9 % flush 10 mL  10 mL Intravenous PRN Sarahy Mike APRN       • sodium chloride 0.9 % flush 10 mL  10 mL Intravenous Q12H Sarahy Mike APRN   10 mL at 07/05/21 2017   • sodium chloride 0.9 % flush 10 mL  10 mL Intravenous PRN Sarahy Mike APRN       • timolol (TIMOPTIC) 0.5 % ophthalmic solution 1 drop  1 drop Left Eye QAM Sarahy Mike APRN   1 drop at 07/08/21 0619   • vancomycin oral solution 125 mg  125 mg Oral Q6H Sundeep Aldridge MD   125 mg at 07/08/21 0619       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    • Chronic otitis media    • Diabetes (CMS/HCC)    • End stage renal disease on dialysis (CMS/HCC)    • Eustachian tube dysfunction    • GERD  (gastroesophageal reflux disease)    • Glaucoma    • HTN (hypertension)    • Hyperlipidemia    • Mucoid otitis media    • Noncompliance of patient with renal dialysis (CMS/HCC)    • Tobacco abuse        Past Surgical History:  Past Surgical History:   Procedure Laterality Date   • ARTERIOVENOUS FISTULA     • CATARACT EXTRACTION WITH INTRAOCULAR LENS IMPLANT     •  SECTION     • CHOLECYSTECTOMY     • MYRINGOTOMY W/ TUBES Bilateral    • MYRINGOTOMY W/ TUBES Right    • TUBAL ABDOMINAL LIGATION         Family History  Family History   Problem Relation Age of Onset   • Heart disease Mother    • Diabetes Father    • Colon cancer Neg Hx    • Colon polyps Neg Hx        Social History  Social History     Socioeconomic History   • Marital status: Single     Spouse name: Not on file   • Number of children: Not on file   • Years of education: Not on file   • Highest education level: Not on file   Tobacco Use   • Smoking status: Current Every Day Smoker     Packs/day: 2.00     Years: 6.00     Pack years: 12.00     Types: Cigarettes   • Smokeless tobacco: Never Used   Vaping Use   • Vaping Use: Never used   Substance and Sexual Activity   • Alcohol use: No   • Drug use: No   • Sexual activity: Defer         Review of Systems:  Unable to obtain due to intubated, unresponsive    Objective:  Patient Vitals for the past 24 hrs:   BP Temp Temp src Pulse Resp SpO2   21 0037 147/64 96.1 °F (35.6 °C) Oral 63 18 96 %   21 1500 140/66 98.4 °F (36.9 °C) Oral 79 20 94 %   21 0840 127/60 98.1 °F (36.7 °C) Oral 72 16 96 %     No intake or output data in the 24 hours ending 21 0813  General: intubated  Chest:  clear bilaterally  CVS: regular rate and rhythm  Abdominal: soft, nontender, positive bowel sounds  Extremities: cyanotic/dusky  Skin: cool and dry      Labs:  Results from last 7 days   Lab Units 21  0459 21  0440   WBC 10*3/mm3 4.80 6.40   HEMOGLOBIN g/dL 10.8* 10.0*   HEMATOCRIT % 36.0 33.7*     PLATELETS 10*3/mm3 198 188         Results from last 7 days   Lab Units 07/07/21  0533 07/03/21  0459 07/02/21  0440   SODIUM mmol/L 132* 132* 136   POTASSIUM mmol/L 5.0 4.8 4.4   CHLORIDE mmol/L 95* 91* 96*   CO2 mmol/L 25.0 28.0 30.0*   BUN mg/dL 21 30* 23   CREATININE mg/dL 3.69* 3.93* 3.24*   CALCIUM mg/dL 9.1 9.1 8.8   GLUCOSE mg/dL 98 122* 139*       Radiology:   Imaging Results (Last 72 Hours)     ** No results found for the last 72 hours. **          Culture:  Blood Culture   Date Value Ref Range Status   06/30/2021 No growth at 5 days  Final   06/30/2021 No growth at 5 days  Final         Assessment   1.  End stage renal disease on HD TTS  2.  Metabolic encephalopathy  3.  Type 2 diabetes   4.  Hypertension with periods of hypotension  5.  Anemia of CKD  6.  COPD  7.  C Diff colitis  8.  Hyponatremia  9.  S/p cardiac arrest on 7/08    Plan:  1.  Patient now in CCU. Stabilization efforts underway. STAT labs pending.   2.  Dialysis is due today but will defer until patient hemodynamically stable  3.  Prognosis uncertain. Discussed with Dr Luis Carlos LEPE. ANTHONY Lemos  7/8/2021  08:13 CDT    Electronically signed by Gomez Lemos APRN at 07/08/21 0832     Sundeep Aldridge MD at 07/08/21 0811              [facility] Medicine Services  INPATIENT PROGRESS NOTE    Patient Name: Mini Gentile  Date of Admission: 6/30/2021  Today's Date: 07/08/21  Length of Stay: 8  Primary Care Physician: Bharati Dahl APRN    Subjective   Chief Complaint: Follow-up  HPI   CODE BLUE page heard overhead.  I responded to it  Resuscitation already taking place.        Review of Systems   Patient unable to give any historical details    Objective    Temp:  [96.1 °F (35.6 °C)-98.4 °F (36.9 °C)] 96.1 °F (35.6 °C)  Heart Rate:  [63-79] 63  Resp:  [16-20] 18  BP: (127-147)/(60-66) 147/64  Physical Exam   She is unresponsive  Post resuscitation exam  Her color is much  improved  She has agonal breathing  She is intubated and mechanically ventilated.  Initial setting care off RT.  Respiratory rate 26, tidal volume 400, FiO2 at 60, PEEP of 5 SPO2 is 90 to 93%; ET CO2 was in the 50s initially but had gone down to 48.  Blood gas pending  Adequate air exchange positive wheezing, diminished breath sound right mid axillary line (x-ray post intubation noted.  We had to withdraw back the ET tube to about 22 by the lip) follow-up chest x-ray looks improved.  Has pleural effusion on the right  No gross consolidation  S1-S2, regular; pulses present but initially faint as we were transporting her to the unit.  We gave another epi during transport  Telemetry showed sinus rhythm at 72  Soft abdomen, nontender  Significant edema bilateral extremities  Sedated  Results Review:  I have reviewed the labs, radiology results, and diagnostic studies.    Laboratory Data:   Results from last 7 days   Lab Units 07/03/21  0459 07/02/21  0440   WBC 10*3/mm3 4.80 6.40   HEMOGLOBIN g/dL 10.8* 10.0*   HEMATOCRIT % 36.0 33.7*   PLATELETS 10*3/mm3 198 188        Results from last 7 days   Lab Units 07/07/21  0533 07/03/21  0459 07/02/21  0440   SODIUM mmol/L 132* 132* 136   POTASSIUM mmol/L 5.0 4.8 4.4   CHLORIDE mmol/L 95* 91* 96*   CO2 mmol/L 25.0 28.0 30.0*   BUN mg/dL 21 30* 23   CREATININE mg/dL 3.69* 3.93* 3.24*   CALCIUM mg/dL 9.1 9.1 8.8   GLUCOSE mg/dL 98 122* 139*       Culture Data:   No results found for: BLOODCX, URINECX, WOUNDCX, MRSACX, RESPCX, STOOLCX    Radiology Data:   Imaging Results (Last 24 Hours)     ** No results found for the last 24 hours. **          I have reviewed the patient's current medications.     Assessment/Plan     Active Hospital Problems    Diagnosis    • **Altered mental status    • Fever    • Elevated troponin    • Fx humeral neck, right, with nonunion, subsequent encounter    • Pleural effusion on right    • C. difficile diarrhea    • Chronic anemia    • DM2 (diabetes  mellitus, type 2) (CMS/[facility])    • HTN (hypertension)      Problem List  Status post cardiorespiratory arrest (?PEA) -resuscitated for about 10 minutes before return of spontaneous circulation  Hemoptysis  End-stage renal disease on maintenance dialysis  C. difficile associated diarrhea  Chronic anemia  Diabetes mellitus type 2  History of hypertension  Right pleural effusion  History of humeral neck fracture right with nonunion      Cont on post resuscitation effort  Pulmonary consult: hemoptysis; vent management  Prn Levophed  Consider central line if needing pressor; most recent blood pressure is in the 180s.  Could be post resuscitation effect of epinephrine. She has 2 gauge 18 placed during resuscitation effort  STAT labs including type and screen  EKG  Echocardiogram  I spoke to ABRAHAN Lepe.  He claimed that he has been in relationship with her for the past 20 years and has paperwork.  We discussed about her condition.  She would like to discuss with children subsequent decision on resuscitation effort if the event recurs.  duoneb and solumedrol - wheezing/bronchospasm    aspirin, 81 mg, Oral, Daily  atropine, 1 drop, Left Eye, Daily  brimonidine, 1 drop, Left Eye, BID  carvedilol, 6.25 mg, Oral, BID With Meals  cholestyramine, 1 packet, Oral, Q8H  famotidine, 20 mg, Oral, Daily  heparin (porcine), 5,000 Units, Subcutaneous, Q12H  insulin regular, 2-7 Units, Subcutaneous, TID With Meals  methylPREDNISolone sodium succinate, 125 mg, Intravenous, Once  methylPREDNISolone sodium succinate, 60 mg, Intravenous, Q8H  nicotine, 1 patch, Transdermal, Q24H  pravastatin, 10 mg, Oral, Nightly  saccharomyces boulardii, 500 mg, Oral, BID  sertraline, 25 mg, Oral, Daily  sodium chloride, 10 mL, Intravenous, Q12H  timolol, 1 drop, Left Eye, QAM  vancomycin, 125 mg, Oral, Q6H    Discussed with nursing, nephrology service.    Critical care time spent greater than 30 minutes separate from attendance to CODE  BLUE            Discharge Planning: To be determined    Electronically signed by Sundeep Aldridge MD, 07/08/21, 08:11 CDT.  Ekg: NSR, low voltage. No acute sttw changes    Electronically signed by Sundeep Aldridge MD at 07/08/21 0837       Consult Notes (last 24 hours) (Notes from 07/07/21 1059 through 07/08/21 1059)    No notes of this type exist for this encounter.

## 2021-07-08 NOTE — PROGRESS NOTES
[facility] Medicine Services  INPATIENT PROGRESS NOTE    Patient Name: Mini Gentile  Date of Admission: 6/30/2021  Today's Date: 07/08/21  Length of Stay: 8  Primary Care Physician: Bharati Dahl APRN    Subjective   Chief Complaint: Follow-up  HPI   CODE BLUE page heard overhead.  I responded to it  Resuscitation already taking place.        Review of Systems   Patient unable to give any historical details    Objective    Temp:  [96.1 °F (35.6 °C)-98.4 °F (36.9 °C)] 96.1 °F (35.6 °C)  Heart Rate:  [63-79] 63  Resp:  [16-20] 18  BP: (127-147)/(60-66) 147/64  Physical Exam   She is unresponsive  Post resuscitation exam  Her color is much improved  She has agonal breathing  She is intubated and mechanically ventilated.  Initial setting care off RT.  Respiratory rate 26, tidal volume 400, FiO2 at 60, PEEP of 5 SPO2 is 90 to 93%; ET CO2 was in the 50s initially but had gone down to 48.  Blood gas pending  Adequate air exchange positive wheezing, diminished breath sound right mid axillary line (x-ray post intubation noted.  We had to withdraw back the ET tube to about 22 by the lip) follow-up chest x-ray looks improved.  Has pleural effusion on the right  No gross consolidation  S1-S2, regular; pulses present but initially faint as we were transporting her to the unit.  We gave another epi during transport  Telemetry showed sinus rhythm at 72  Soft abdomen, nontender  Significant edema bilateral extremities  Sedated  Results Review:  I have reviewed the labs, radiology results, and diagnostic studies.    Laboratory Data:   Results from last 7 days   Lab Units 07/03/21  0459 07/02/21  0440   WBC 10*3/mm3 4.80 6.40   HEMOGLOBIN g/dL 10.8* 10.0*   HEMATOCRIT % 36.0 33.7*   PLATELETS 10*3/mm3 198 188        Results from last 7 days   Lab Units 07/07/21  0533 07/03/21  0459 07/02/21  0440   SODIUM mmol/L 132* 132* 136   POTASSIUM mmol/L 5.0 4.8 4.4   CHLORIDE mmol/L 95* 91* 96*      CO2 mmol/L 25.0 28.0 30.0*   BUN mg/dL 21 30* 23   CREATININE mg/dL 3.69* 3.93* 3.24*   CALCIUM mg/dL 9.1 9.1 8.8   GLUCOSE mg/dL 98 122* 139*       Culture Data:   No results found for: BLOODCX, URINECX, WOUNDCX, MRSACX, RESPCX, STOOLCX    Radiology Data:   Imaging Results (Last 24 Hours)     ** No results found for the last 24 hours. **          I have reviewed the patient's current medications.     Assessment/Plan     Active Hospital Problems    Diagnosis    • **Altered mental status    • Fever    • Elevated troponin    • Fx humeral neck, right, with nonunion, subsequent encounter    • Pleural effusion on right    • C. difficile diarrhea    • Chronic anemia    • DM2 (diabetes mellitus, type 2) (CMS/[facility])    • HTN (hypertension)      Problem List  Status post cardiorespiratory arrest (?PEA) -resuscitated for about 10 minutes before return of spontaneous circulation  Hemoptysis  End-stage renal disease on maintenance dialysis  C. difficile associated diarrhea  Chronic anemia  Diabetes mellitus type 2  History of hypertension  Right pleural effusion  History of humeral neck fracture right with nonunion      Cont on post resuscitation effort  Pulmonary consult: hemoptysis; vent management  Prn Levophed  Consider central line if needing pressor; most recent blood pressure is in the 180s.  Could be post resuscitation effect of epinephrine. She has 2 gauge 18 placed during resuscitation effort  STAT labs including type and screen  EKG  Echocardiogram  I spoke to ABRAHAN Lepe.  He claimed that he has been in relationship with her for the past 20 years and has paperwork.  We discussed about her condition.  She would like to discuss with children subsequent decision on resuscitation effort if the event recurs.  duoneb and solumedrol - wheezing/bronchospasm    aspirin, 81 mg, Oral, Daily  atropine, 1 drop, Left Eye, Daily  brimonidine, 1 drop, Left Eye, BID  carvedilol, 6.25 mg, Oral, BID With Meals  cholestyramine, 1  packet, Oral, Q8H  famotidine, 20 mg, Oral, Daily  heparin (porcine), 5,000 Units, Subcutaneous, Q12H  insulin regular, 2-7 Units, Subcutaneous, TID With Meals  methylPREDNISolone sodium succinate, 125 mg, Intravenous, Once  methylPREDNISolone sodium succinate, 60 mg, Intravenous, Q8H  nicotine, 1 patch, Transdermal, Q24H  pravastatin, 10 mg, Oral, Nightly  saccharomyces boulardii, 500 mg, Oral, BID  sertraline, 25 mg, Oral, Daily  sodium chloride, 10 mL, Intravenous, Q12H  timolol, 1 drop, Left Eye, QAM  vancomycin, 125 mg, Oral, Q6H    Discussed with nursing, nephrology service.    Critical care time spent greater than 30 minutes separate from attendance to CODE BLUE            Discharge Planning: To be determined    Electronically signed by Sundeep Aldridge MD, 07/08/21, 08:11 CDT.  Ekg: NSR, low voltage. No acute sttw changes    I met with family in the meeting room.  This was attended by Sundeep, son Julio Cesar and there granddaughter as well as their spouses.  Family decided to place her NO CPR.  I spoke also to Kevyn.  We found out that patient's potassium was 6.3.  Nephrology made aware.  Plan of management deferred to nephrology  Patient's troponin likewise elevated patient had not received any defibrillation in the process of resuscitation but by itself can cause elevated troponin.  Echocardiogram already requested.  We will check with cardiology for further recommendation.    Electronically signed by Sundeep Aldridge MD, [unfilled], 11:11 CDT.

## 2021-07-08 NOTE — CASE MANAGEMENT/SOCIAL WORK
Continued Stay Note   [place]     Patient Name: Mini Gentile  MRN: [id number]  Today's Date: 7/8/2021    Admit Date: 6/30/2021    Discharge Plan     Row Name 07/08/21 1048       Plan    Plan  unclear    Plan Comments  Patient now in CCU after code blue event this am.  Patient is now on vent.  Plan now unclear.  SW will follow.        Discharge Codes    No documentation.             STACI Ramirez

## 2021-07-08 NOTE — PROGRESS NOTES
Nephrology ([facility]) Progress Note      Patient:  Mini Gentile  YOB: 1960  Date of Service: 7/8/2021  MRN: [id number]   Acct: [id number]   Primary Care Physician: Bharati Dahl APRN  Advance Directive:   Code Status and Medical Interventions:   Ordered at: 06/30/21 1620     Level Of Support Discussed With:    Patient     Code Status:    CPR     Medical Interventions (Level of Support Prior to Arrest):    Full     Admit Date: 6/30/2021       Hospital Day: 8  Referring Provider: No ref. provider found      Patient personally seen and examined.  Complete chart including Consults, Notes, Operative Reports, Labs, Cardiology, and Radiology studies reviewed as able.        Subjective:  Mini Gentile is a 61 y.o. female  whom we were consulted for ESRD.  On maintenance hemodialysis at [facility] on Tuesday Thursday Saturday.  She was recently hospitalized for C.Diff and pseudomonal bacteremia.  She was discharged back to SNF on 6/24/21.  She then developed a fever of 102, diarrhea, altered mental status, and right shoulder pain (has a known history of right shoulder fracture). Patient was first admitted to CCU but was then transferred to the floor. Has been tolerating HD well.    This morning she had unwitnessed cardiac arrest, ROSC was achieved and she is seen just as she arrives to CCU. Intubated, unresponsive, resuscitation/stabilization efforts are ongoing.    Allergies:  Bupropion, Chantix [varenicline], Gabapentin, Azithromycin, Levofloxacin, Penicillins, and Sulfa antibiotics    Home Meds:  Medications Prior to Admission   Medication Sig Dispense Refill Last Dose   • aspirin 81 MG chewable tablet Chew 81 mg Daily.   6/29/2021 at Unknown time   • atropine 1 % ophthalmic solution Administer 1 drop into the left eye Daily.   6/29/2021 at Unknown time   • brimonidine (ALPHAGAN) 0.2 % ophthalmic solution Administer 1 drop into the left eye 2 (two) times  a day.   2021 at Unknown time   • carvedilol (COREG) 6.25 MG tablet Take 6.25 mg by mouth 2 (Two) Times a Day With Meals.   2021 at Unknown time   • cholecalciferol (VITAMIN D3) 25 MCG (1000 UT) tablet Take 2,000 Units by mouth Daily.   2021 at Unknown time   • dorzolamide (TRUSOPT) 2 % ophthalmic solution Administer 1 drop into the left eye 2 (Two) Times a Day.   2021 at Unknown time   • famotidine (PEPCID) 20 MG tablet Take 1 tablet by mouth Daily.   2021 at Unknown time   • Ferric Citrate 1  MG(Fe) tablet Take 1 tablet by mouth 3 (Three) Times a Day With Meals.   2021 at Unknown time   • fluticasone (FLONASE) 50 MCG/ACT nasal spray 2 sprays into the nostril(s) as directed by provider 2 (Two) Times a Day.   2021 at Unknown time   • lactobacillus acidophilus (RISAQUAD) capsule capsule Take 1 capsule by mouth Daily. 30 capsule 2 2021 at Unknown time   • ondansetron (ZOFRAN) 4 MG tablet Take 1 tablet by mouth Every 6 (Six) Hours As Needed for Nausea or Vomiting. 24 tablet 2 2021 at Unknown time   • sertraline (ZOLOFT) 25 MG tablet Take 1 tablet by mouth Daily.   2021 at Unknown time   • sevelamer (RENAGEL) 800 MG tablet Take 800 mg by mouth 3 (Three) Times a Day With Meals.   2021 at Unknown time   • timolol (TIMOPTIC) 0.5 % ophthalmic solution Administer 1 drop into the left eye Every Morning.  12 2021 at Unknown time   • acetaminophen (TYLENOL) 325 MG tablet Take 650 mg by mouth Every 4 (Four) Hours As Needed for Mild Pain  or Fever.   Unknown at Unknown time   • ipratropium-albuterol (DUO-NEB) 0.5-2.5 mg/3 ml nebulizer Take 3 mL by nebulization Every 6 (Six) Hours As Needed for Wheezing or Shortness of Air.   Unknown at Unknown time   • [] vancomycin 50 MG/ML reconstituted solution oral solution reconstituted Take 2.5 mL by mouth Every 6 (Six) Hours for 18 doses. Indications: Clostridium Difficile Infection 45 mL 0        Medicines:  Current  Facility-Administered Medications   Medication Dose Route Frequency Provider Last Rate Last Admin   • acetaminophen (TYLENOL) tablet 650 mg  650 mg Oral Q4H PRN Sarahy Mike, APRN   650 mg at 07/07/21 1741    Or   • acetaminophen (TYLENOL) suppository 650 mg  650 mg Rectal Q4H PRN Sarahy Mike, APRN       • aspirin EC tablet 81 mg  81 mg Oral Daily Sarahy Mike APRN   81 mg at 07/07/21 0840   • atropine 1 % ophthalmic solution 1 drop  1 drop Left Eye Daily Sarahy Mike APRN   1 drop at 07/07/21 0845   • brimonidine (ALPHAGAN) 0.2 % ophthalmic solution 1 drop  1 drop Left Eye BID Sarahy Mike APRN   1 drop at 07/07/21 2031   • carvedilol (COREG) tablet 6.25 mg  6.25 mg Oral BID With Meals Sarahy Mike APRN   6.25 mg at 07/07/21 1741   • cholestyramine (QUESTRAN) packet 1 packet  1 packet Oral Q8H Sundeep Aldridge MD   1 packet at 07/07/21 2029   • dextrose (D50W) 25 g/ 50mL Intravenous Solution 25 g  25 g Intravenous Q15 Min PRN Sarahy Mike APRN       • dextrose (GLUTOSE) oral gel 15 g  15 g Oral Q15 Min PRN Sarahy Mike, APRN       • famotidine (PEPCID) tablet 20 mg  20 mg Oral Daily Sarahy Mike APRN   20 mg at 07/07/21 0841   • glucagon (human recombinant) (GLUCAGEN DIAGNOSTIC) injection 1 mg  1 mg Subcutaneous Q15 Min PRN Sarahy Mike APRN       • heparin (porcine) 5000 UNIT/ML injection 5,000 Units  5,000 Units Subcutaneous Q12H WoSarahy carmen APRN   5,000 Units at 07/07/21 2030   • insulin regular (humuLIN R,novoLIN R) injection 2-7 Units  2-7 Units Subcutaneous TID With Meals Bin Guy DO   3 Units at 07/07/21 1741   • melatonin tablet 6 mg  6 mg Oral Nightly PRN Hang Giles MD   6 mg at 07/08/21 0022   • nicotine (NICODERM CQ) 21 MG/24HR patch 1 patch  1 patch Transdermal Q24H Sundeep Aldridge MD   1 patch at 07/04/21 2109   • ondansetron (ZOFRAN) injection 4 mg  4 mg Intravenous Q6H PRN Rashid,  Sarahy MONTERO, APRN       • Pharmacy Consult   Does not apply Continuous PRN Sundeep Aldridge MD       • pravastatin (PRAVACHOL) tablet 10 mg  10 mg Oral Nightly WoeltChavo montgomeryia K, APRN   10 mg at 21   • saccharomyces boulardii (FLORASTOR) capsule 500 mg  500 mg Oral BID WoeltChavo montgomeryia K, APRN   500 mg at 21   • sertraline (ZOLOFT) tablet 25 mg  25 mg Oral Daily WoeltChavo montgomeryia K, APRN   25 mg at 21 0841   • sodium chloride 0.9 % flush 10 mL  10 mL Intravenous PRN WoeltSamantha montgomerySarahy K, APRN       • sodium chloride 0.9 % flush 10 mL  10 mL Intravenous Q12H WoeltSamantha montgomerySarahy K, APRN   10 mL at 21   • sodium chloride 0.9 % flush 10 mL  10 mL Intravenous PRN WoeltSarahy montgomery, APRN       • timolol (TIMOPTIC) 0.5 % ophthalmic solution 1 drop  1 drop Left Eye QAM WoSarahy carmen, APRN   1 drop at 21 0619   • vancomycin oral solution 125 mg  125 mg Oral Q6H Sundeep Aldridge MD   125 mg at 21 0619       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    • Chronic otitis media    • Diabetes (CMS/[facility])    • End stage renal disease on dialysis (CMS/[facility])    • Eustachian tube dysfunction    • GERD (gastroesophageal reflux disease)    • Glaucoma    • HTN (hypertension)    • Hyperlipidemia    • Mucoid otitis media    • Noncompliance of patient with renal dialysis (CMS/[facility])    • Tobacco abuse        Past Surgical History:  Past Surgical History:   Procedure Laterality Date   • ARTERIOVENOUS FISTULA     • CATARACT EXTRACTION WITH INTRAOCULAR LENS IMPLANT     •  SECTION     • CHOLECYSTECTOMY     • MYRINGOTOMY W/ TUBES Bilateral    • MYRINGOTOMY W/ TUBES Right    • TUBAL ABDOMINAL LIGATION         Family History  Family History   Problem Relation Age of Onset   • Heart disease Mother    • Diabetes Father    • Colon cancer Neg Hx    • Colon polyps Neg Hx        Social History  Social History      Socioeconomic History   • Marital status: Single     Spouse name: Not on file   • Number of children: Not on file   • Years of education: Not on file   • Highest education level: Not on file   Tobacco Use   • Smoking status: Current Every Day Smoker     Packs/day: 2.00     Years: 6.00     Pack years: 12.00     Types: Cigarettes   • Smokeless tobacco: Never Used   Vaping Use   • Vaping Use: Never used   Substance and Sexual Activity   • Alcohol use: No   • Drug use: No   • Sexual activity: Defer         Review of Systems:  Unable to obtain due to intubated, unresponsive    Objective:  Patient Vitals for the past 24 hrs:   BP Temp Temp src Pulse Resp SpO2   07/08/21 0037 147/64 96.1 °F (35.6 °C) Oral 63 18 96 %   07/07/21 1500 140/66 98.4 °F (36.9 °C) Oral 79 20 94 %   07/07/21 0840 127/60 98.1 °F (36.7 °C) Oral 72 16 96 %     No intake or output data in the 24 hours ending 07/08/21 0813  General: intubated  Chest:  clear bilaterally  CVS: regular rate and rhythm  Abdominal: soft, nontender, positive bowel sounds  Extremities: cyanotic/dusky  Skin: cool and dry      Labs:  Results from last 7 days   Lab Units 07/03/21  0459 07/02/21  0440   WBC 10*3/mm3 4.80 6.40   HEMOGLOBIN g/dL 10.8* 10.0*   HEMATOCRIT % 36.0 33.7*   PLATELETS 10*3/mm3 198 188         Results from last 7 days   Lab Units 07/07/21  0533 07/03/21  0459 07/02/21  0440   SODIUM mmol/L 132* 132* 136   POTASSIUM mmol/L 5.0 4.8 4.4   CHLORIDE mmol/L 95* 91* 96*   CO2 mmol/L 25.0 28.0 30.0*   BUN mg/dL 21 30* 23   CREATININE mg/dL 3.69* 3.93* 3.24*   CALCIUM mg/dL 9.1 9.1 8.8   GLUCOSE mg/dL 98 122* 139*       Radiology:   Imaging Results (Last 72 Hours)     ** No results found for the last 72 hours. **          Culture:  Blood Culture   Date Value Ref Range Status   06/30/2021 No growth at 5 days  Final   06/30/2021 No growth at 5 days  Final         Assessment   1.  End stage renal disease on HD TTS  2.  Metabolic encephalopathy  3.  Type 2 diabetes    4.  Hypertension with periods of hypotension  5.  Anemia of CKD  6.  COPD  7.  C Diff colitis  8.  Hyponatremia  9.  S/p cardiac arrest on 7/08    Plan:  1.  Patient now in CCU. Stabilization efforts underway. STAT labs pending.   2.  Dialysis is due today but will defer until patient hemodynamically stable  3.  Prognosis uncertain. Discussed with Dr Luis Carlos Lemos, APRN  7/8/2021  08:13 CDT

## 2021-07-09 NOTE — PLAN OF CARE
Goal Outcome Evaluation:               Vital signs stable over night. Pt began having twitching/jerking around 5am.  Provider notified.  She is not following commands but does respond to painful stimulation. Blood suctioned from ETT.

## 2021-07-09 NOTE — PLAN OF CARE
Goal Outcome Evaluation:            Pt remains in ccu on ventilator.  Propfol gtt remains in place.  Myoclonal jerking now more frequent.  Neuro consulted, stat eeg ordered for tonight.

## 2021-07-09 NOTE — CASE MANAGEMENT/SOCIAL WORK
Continued Stay Note   [place]     Patient Name: Mini Gentile  MRN: [id number]  Today's Date: 7/9/2021    Admit Date: 6/30/2021    Discharge Plan     Row Name 07/09/21 0931       Plan    Plan  unclear    Plan Comments  Patient remains in CCU on vent.  Patient was residing at [facility] upon admit.  Patient may need LTAC placement.  SW will follow.        Discharge Codes    No documentation.             STACI Ramirez

## 2021-07-09 NOTE — PLAN OF CARE
Goal Outcome Evaluation:  Plan of Care Reviewed With: patient        Progress: no change  Outcome Summary: Nutrition consult received for enteral nutrition. Pt is on vent support and propofol sedation. She has an OG tube. She has dx of ESRD and receives routine HD. Recommend to start Novasource Renal at 15mL/hour x 8 hours, then increase to goal rate of 25mL/hour. Will need to flush 1 packet of beneprotein in 120mL water TID to meet protein needs. Will flush OG tube with 30mL water every hour via pump. TF with propofol and beneprotein kcal will provide 1437 kcal and meet 104% of currently est'd needs and 99% of est'd protein needs. Will follow for TF tolerance and make changes as needed.

## 2021-07-09 NOTE — PAYOR COMM NOTE
"  [id number]  7/8 CLINICAL UPDATE  UR  [id number]    NOW ON VENT  VamshihiMini mariscal (61 y.o. Female)     Date of Birth Social Security Number Address Home Phone MRN    1960  [id number] [place] [id number] [phone] [id number]    Spiritism Marital Status          Other Single       Admission Date Admission Type Admitting Provider Attending Provider Department, Room/Bed    6/30/21 Emergency Sundeep Aldridge MD [place], Glenn Riego, MD [facility] CARDIAC CARE, C005/1    Discharge Date Discharge Disposition Discharge Destination                       Attending Provider: Sundeep Aldridge MD    Allergies: Bupropion, Chantix [Varenicline], Gabapentin, Azithromycin, Levofloxacin, Penicillins, Sulfa Antibiotics    Isolation: Spore   Infection: C.difficile (06/17/21), COVID Screen (preop/placement) (07/06/21)   Code Status: No CPR    Ht: 147.3 cm (57.99\")   Wt: 67.3 kg (148 lb 5.9 oz)    Admission Cmt: None   Principal Problem: Altered mental status [R41.82]                 Active Insurance as of 6/30/2021     Primary Coverage     Payor Plan Insurance Group Employer/Plan Group    [facility] [employer] MEDICARE REPLACEMENT      Payor Plan Address Payor Plan Phone Number Payor Plan Fax Number Effective Dates    [address] [address] [phone]  4/1/2021 - None Entered    [facility] [id number]       Subscriber Name Subscriber Birth Date Member ID       MINI PEÑA MICHAEL 1960 [id number]           Secondary Coverage     Payor Plan Insurance Group Employer/Plan Group    WELLCARE [facility] WELLCARE MEDICAID      Payor Plan Address Payor Plan Phone Number Payor Plan Fax Number Effective Dates    [address] [address] [phone]  11/4/2016 - None Entered    [facility] [id number]       Subscriber Name Subscriber Birth Date Member ID       MINI PEÑA MICHAEL 1960 [id number]                 Emergency Contacts      (Rel.) Home Phone Work Phone Mobile " Phone    Erica Bowling (Other) [phone] -- [phone]    Jose Bowling (Significant Other) [phone] -- [phone]    Arlette Barker (Daughter) -- -- [phone]              Current Facility-Administered Medications   Medication Dose Route Frequency Provider Last Rate Last Admin   • acetaminophen (TYLENOL) tablet 650 mg  650 mg Oral Q4H PRN Woeltulises, Sarahy K, APRN   650 mg at 07/07/21 1741    Or   • acetaminophen (TYLENOL) suppository 650 mg  650 mg Rectal Q4H PRN Woeltulises, Sarahy K, APRN       • aspirin EC tablet 81 mg  81 mg Oral Daily WoeltChavo montgomeryia K, APRN   81 mg at 07/07/21 0840   • atropine 1 % ophthalmic solution 1 drop  1 drop Left Eye Daily WoeltSarahy montgomery, APRN   1 drop at 07/07/21 0845   • brimonidine (ALPHAGAN) 0.2 % ophthalmic solution 1 drop  1 drop Left Eye BID WoeltSarahy montgomery, APRN   1 drop at 07/08/21 2139   • carvedilol (COREG) tablet 6.25 mg  6.25 mg Oral BID With Meals WoSarahy carmen APRN   6.25 mg at 07/08/21 2137   • cholestyramine (QUESTRAN) packet 1 packet  1 packet Oral Q8H Sundeep Aldridge MD   1 packet at 07/08/21 2137   • dextrose (D50W) 25 g/ 50mL Intravenous Solution 25 g  25 g Intravenous Q15 Min PRN Sarahy Mike K, APRN       • dextrose (GLUTOSE) oral gel 15 g  15 g Oral Q15 Min PRN WoeltSamantha montgomerySarahy K, APRN       • famotidine (PEPCID) tablet 20 mg  20 mg Oral Daily WoeltSarahy montgomery K, APRN   20 mg at 07/07/21 0841   • glucagon (human recombinant) (GLUCAGEN DIAGNOSTIC) injection 1 mg  1 mg Subcutaneous Q15 Min PRN Woeltulises, Sarahy K, APRN       • heparin (porcine) 5000 UNIT/ML injection 5,000 Units  5,000 Units Subcutaneous Q12H WoeltSarahy montgomery, APRN   5,000 Units at 07/08/21 2138   • insulin regular (humuLIN R,novoLIN R) injection 2-7 Units  2-7 Units Subcutaneous TID With Meals Bin Guy DO   3 Units at 07/07/21 1741   • ipratropium-albuterol (DUO-NEB) nebulizer solution 3 mL  3 mL Nebulization 4x Daily - RT Elvia Valadez MD   3 mL  at 07/08/21 1858   • melatonin tablet 6 mg  6 mg Oral Nightly PRN Hang Giles MD   6 mg at 07/08/21 0022   • Morphine sulfate (PF) injection 2 mg  2 mg Intravenous Q2H PRN Elvia Valadez MD   2 mg at 07/08/21 1635   • nicotine (NICODERM CQ) 21 MG/24HR patch 1 patch  1 patch Transdermal Q24H Sundeep Aldridge MD   1 patch at 07/08/21 2138   • ondansetron (ZOFRAN) injection 4 mg  4 mg Intravenous Q6H PRN Sarahy Mike K, APRN       • pravastatin (PRAVACHOL) tablet 10 mg  10 mg Oral Nightly DorothyeltSarahy montgomery, APRN   10 mg at 07/08/21 2141   • propofol (DIPRIVAN) infusion 10 mg/mL 100 mL  5-50 mcg/kg/min Intravenous Titrated Sundeep Aldridge MD 9.92 mL/hr at 07/09/21 0732 25 mcg/kg/min at 07/09/21 0732   • saccharomyces boulardii (FLORASTOR) capsule 500 mg  500 mg Oral BID WoeltChavo montgomeryia K, APRN   500 mg at 07/08/21 2137   • sertraline (ZOLOFT) tablet 25 mg  25 mg Oral Daily WoeltSamantha montgomerySarahy K, APRN   25 mg at 07/07/21 0841   • sodium chloride 0.9 % flush 10 mL  10 mL Intravenous PRN Chavo Mikeia K, APRN       • sodium chloride 0.9 % flush 10 mL  10 mL Intravenous Q12H WoeltSamantha montgomerySarahy K, APRN   10 mL at 07/08/21 2139   • sodium chloride 0.9 % flush 10 mL  10 mL Intravenous PRN WoChavo carmenia K, APRN       • timolol (TIMOPTIC) 0.5 % ophthalmic solution 1 drop  1 drop Left Eye QAM Sarahy Mike, APRN   1 drop at 07/08/21 0619   • vancomycin oral solution 125 mg  125 mg Oral Q6H Sundeep Aldridge MD   125 mg at 07/09/21 0541     Orders (last 24 hrs)      Start     Ordered    07/09/21 0645  midazolam (VERSED) injection 2 mg  Once      07/09/21 0555    07/09/21 0600  CBC (No Diff)  Morning Draw      07/08/21 0830    07/09/21 0600  Comprehensive Metabolic Panel  Morning Draw      07/08/21 0830    07/09/21 0357  Blood Gas, Arterial -  Once      07/09/21 0350    07/09/21 0327  Ventilator - AC/VC  Continuous      07/09/21 0326    07/09/21 0227  Blood Gas, Arterial -   Once      07/09/21 0226    07/08/21 2257  POC Glucose Once  Once      07/08/21 2244    07/08/21 1813  XR Abdomen KUB  1 Time Imaging      07/08/21 1813    07/08/21 1751  POC Glucose Once  Once      07/08/21 1739    07/08/21 1600  methylPREDNISolone sodium succinate (SOLU-Medrol) injection 60 mg  Every 8 Hours,   Status:  Discontinued      07/08/21 0818    07/08/21 1600  ipratropium-albuterol (DUO-NEB) nebulizer solution 3 mL  4 Times Daily - RT      07/08/21 1237    07/08/21 1350  Hemodialysis Inpatient  Once      07/08/21 1445    07/08/21 1245  perflutren (DEFINITY) lipid microspheres injection 55.2896 mg  Once      07/08/21 1150    07/08/21 1116  POC Glucose Once  Once      07/08/21 1103    07/08/21 1116  Blood Gas, Arterial -  Once      07/08/21 1108    07/08/21 1100  Restraints Non-Violent or Non-Self Destructive  Calendar Day      07/08/21 1120    07/08/21 1054  Code Status and Medical Interventions:  Continuous      07/08/21 1054    07/08/21 1046  Inpatient Cardiology Consult  Once     Specialty:  Cardiology  Provider:  Bin Rasheed MD    07/08/21 1045    07/08/21 1030  Morphine sulfate (PF) injection 2 mg  Every 2 Hours PRN      07/08/21 1031    07/08/21 1030  Code Status and Medical Interventions:  Continuous,   Status:  Canceled      07/08/21 1030    07/08/21 0930  propofol (DIPRIVAN) infusion 10 mg/mL 100 mL  Titrated      07/08/21 0841    07/08/21 0915  methylPREDNISolone sodium succinate (SOLU-Medrol) injection 125 mg  Once      07/08/21 0818    07/08/21 0907  Comprehensive Metabolic Panel  STAT      07/08/21 0812    07/08/21 0832  Troponin  Now Then Every 6 Hours,   Status:  Canceled      07/08/21 0831    07/08/21 0832  Intubation  Once     Comments: This order was created via procedure documentation    07/08/21 0831    07/08/21 0831  Protime-INR  STAT      07/08/21 0830    07/08/21 0831  aPTT  STAT      07/08/21 0830    07/08/21 0829  Inpatient Pulmonology Consult  Once     Specialty:   Pulmonary Disease  Provider:  Elvia Valadez MD    07/08/21 0829    07/08/21 0821  Adult Transthoracic Echo Complete W/ Cont if Necessary Per Protocol  Once      07/08/21 0821    07/08/21 0819  Type & Screen  Once      07/08/21 0818    07/08/21 0818  ECG 12 Lead  STAT      07/08/21 0818    07/08/21 0813  CBC (No Diff)  STAT      07/08/21 0812    07/08/21 0805  XR Chest 1 View  1 Time Imaging      07/08/21 0805    07/08/21 0755  POC Glucose Once  Once      07/08/21 0740    07/08/21 0746  calcium chloride injection  Code / Trauma / Sedation Medication      07/08/21 0852    07/08/21 0744  EPINEPHrine (ADRENALIN) injection  Code / Trauma / Sedation Medication      07/08/21 0845    07/08/21 0744  sodium bicarbonate injection 8.4%  Code / Trauma / Sedation Medication      07/08/21 0847    07/07/21 1400  cholestyramine (QUESTRAN) packet 1 packet  Every 8 Hours Scheduled      07/07/21 0811    07/06/21 2055  melatonin tablet 6 mg  Nightly PRN      07/06/21 2055 07/06/21 1013  Pharmacy Consult  Continuous PRN,   Status:  Discontinued      07/06/21 1014    07/03/21 2000  Vital Signs Every Hour and Per Hospital Policy Based on Patient Condition  Every 4 Hours      07/03/21 1959 07/03/21 1800  insulin regular (humuLIN R,novoLIN R) injection 2-7 Units  3 Times Daily With Meals      07/03/21 1450    07/03/21 1700  POC Glucose 4x Daily AC & at Bedtime  4 Times Daily Before Meals & at Bedtime      07/03/21 1450    07/02/21 1200  saccharomyces boulardii (FLORASTOR) capsule 500 mg  2 Times Daily      07/02/21 1104    07/01/21 2215  nicotine (NICODERM CQ) 21 MG/24HR patch 1 patch  Every 24 Hours Scheduled      07/01/21 2200 07/01/21 0900  sertraline (ZOLOFT) tablet 25 mg  Daily      06/30/21 1617    07/01/21 0900  aspirin EC tablet 81 mg  Daily      06/30/21 1617    07/01/21 0900  atropine 1 % ophthalmic solution 1 drop  Daily      06/30/21 1617    07/01/21 0700  timolol (TIMOPTIC) 0.5 % ophthalmic solution 1 drop  Every  Morning      06/30/21 1617    06/30/21 2100  pravastatin (PRAVACHOL) tablet 10 mg  Nightly      06/30/21 1617    06/30/21 2100  brimonidine (ALPHAGAN) 0.2 % ophthalmic solution 1 drop  2 Times Daily      06/30/21 1617 06/30/21 2100  sodium chloride 0.9 % flush 10 mL  Every 12 Hours Scheduled      06/30/21 1619 06/30/21 2100  heparin (porcine) 5000 UNIT/ML injection 5,000 Units  Every 12 Hours Scheduled      06/30/21 1619 06/30/21 1800  carvedilol (COREG) tablet 6.25 mg  2 Times Daily With Meals      06/30/21 1617    06/30/21 1800  vancomycin oral solution 125 mg  Every 6 Hours Scheduled      06/30/21 1620    06/30/21 1715  famotidine (PEPCID) tablet 20 mg  Daily      06/30/21 1617    06/30/21 1709  dextrose (GLUTOSE) oral gel 15 g  Every 15 Minutes PRN      06/30/21 1709    06/30/21 1709  dextrose (D50W) 25 g/ 50mL Intravenous Solution 25 g  Every 15 Minutes PRN      06/30/21 1709    06/30/21 1709  glucagon (human recombinant) (GLUCAGEN DIAGNOSTIC) injection 1 mg  Every 15 Minutes PRN      06/30/21 1709    06/30/21 1700  Intake and Output  Every Hour      06/30/21 1619    06/30/21 1620  Daily Weights  Daily      06/30/21 1619    06/30/21 1618  ondansetron (ZOFRAN) injection 4 mg  Every 6 Hours PRN      06/30/21 1619    06/30/21 1618  acetaminophen (TYLENOL) tablet 650 mg  Every 4 Hours PRN      06/30/21 1619    06/30/21 1618  acetaminophen (TYLENOL) suppository 650 mg  Every 4 Hours PRN      06/30/21 1619    06/30/21 1617  sodium chloride 0.9 % flush 10 mL  As Needed      06/30/21 1619 06/30/21 0959  sodium chloride 0.9 % flush 10 mL  As Needed      06/30/21 0959    --  SCANNED - TELEMETRY        06/30/21 0000    Pending  NPO Diet  Diet Effective Now     Comments: Should remain in effect until a complete SLP evaluation occurs and a superceding MD order is received.    Pending    Pending  SLP Consult: Eval & Treat RN Dysphagia Screen Failed  Once      Pending    --  SCANNED EKG      06/30/21 0000     Signed and Held  sodium chloride 0.9 % bolus 100 mL  As Needed      Signed and Held    Signed and Held  albumin human 25 % IV SOLN 12.5 g  As Needed      Signed and Held    --  sevelamer (RENAGEL) 800 MG tablet  3 Times Daily With Meals      07/02/21 0937    --  aspirin 81 MG chewable tablet  Daily      07/02/21 0937    --  cholecalciferol (VITAMIN D3) 25 MCG (1000 UT) tablet  Daily      07/02/21 0937    --  SCANNED - TELEMETRY        06/30/21 0000    --  SCANNED - TELEMETRY        06/30/21 0000    --  SCANNED EKG      06/30/21 0000                Ventilator/Non-Invasive Ventilation Settings (From admission, onward) Comment     Start     Ordered    07/09/21 0327  Ventilator - AC/VC  Continuous     Question:  Vent Mode  Answer:  AC/VC    07/09/21 0326                   Physician Progress Notes (last 24 hours) (Notes from 07/08/21 0736 through 07/09/21 0736)      Sundeep Aldridge MD at 07/08/21 0937        It was around 730 this morning when I heard CODE BLUE called overhead in the room 335.  I ran to the patient's room and found nurses had initiated resuscitation effort.  She is being bagged and chest compressions were initiated.  She was lifeless when I saw her.  We did a pulse check and remains no pulse.  She was asystole.  We continued the resuscitation effort for about 10 minutes until we got a return of spontaneous circulation.  Patient in the process of received 2 epinephrine,  sodium bicarbonate and calcium IV pushes.  I also intubated the patient.  I had to use a bougie in the second attempt.  I encountered some bleeding soon after placing the tube.  I did not identify any gross active source of bleeding.  Before blood came to the colorimeter, appropriate change in color suggests patient was appropriately intubated.  There was bilateral breath sounds but right is decrease compared to left.  Patient has known right pleural effusion.  Chest x-ray confirmed placement of ET tube.  Patient was transferred  to critical care unit bed for further management.  A separate post resuscitation note has been written after she was settled in CCU 5.      Electronically signed by Sundeep Aldridge MD at 07/08/21 0970     Gomez Lemos APRN at 07/08/21 0813     Attestation signed by Adi Gonzalez MD at 07/08/21 1904    I have independently interviewed and examined the patient and reviewed the laboratory, imaging, notes and all other records as available.  I have discussed key elements of the care plan with the APRN and agree with the findings and care plan documented above except as noted.      Subjective:  Initially consulted for ESRD and maintenance HD. Patient dialyzes in  clinic on TTS.  She was recently hospitalized for C. Difficile and pseudomonal bacteremia.  She was discharged back to SNF on 6/24/21.  She then developed a fever of 102, diarrhea, ams, and right shoulder pain (has a known history of right shoulder fracture). Patient was first admitted to CCU but was then transferred to the floor.  Patient's main complaint was diarrhea.     Today, moved to CCU after PEA arrest.  Able to tolerate dialysis today with good ultrafiltration.  Discussed with nursing at the bedside.  Remains on ventilator and unable to participate in the history and physical examination.    Objective:  Vitals/labs/studies/notes all reviewed  Exam independently verified with additional comments or findings as noted:  Ext: no edema    Assessment:  End-stage renal disease  Metabolic encephalopathy  Type 2 diabetes   Hypertension with periods of hypotension  Anemia of CKD  COPD  C Diff colitis     Plan:  Hemodialysis Tuesday Thursday Saturday  Monitor labs   Antibiotics per primary service    Adi Gonzalez MD  7/8/2021  19:03 CDT                    Nephrology ([facility]) Progress Note      Patient:  Mini Gentile  YOB: 1960  Date of Service: 7/8/2021  MRN:  [id number]   Acct: [id number]   Primary Care Physician: Bharati Dahl APRN  Advance Directive:   Code Status and Medical Interventions:   Ordered at: 06/30/21 1620     Level Of Support Discussed With:    Patient     Code Status:    CPR     Medical Interventions (Level of Support Prior to Arrest):    Full     Admit Date: 6/30/2021       Hospital Day: 8  Referring Provider: No ref. provider found      Patient personally seen and examined.  Complete chart including Consults, Notes, Operative Reports, Labs, Cardiology, and Radiology studies reviewed as able.        Subjective:  Mini Gentile is a 61 y.o. female  whom we were consulted for ESRD.  On maintenance hemodialysis at [facility] on Tuesday Thursday Saturday.  She was recently hospitalized for C.Diff and pseudomonal bacteremia.  She was discharged back to SNF on 6/24/21.  She then developed a fever of 102, diarrhea, altered mental status, and right shoulder pain (has a known history of right shoulder fracture). Patient was first admitted to CCU but was then transferred to the floor. Has been tolerating HD well.    This morning she had unwitnessed cardiac arrest, ROSC was achieved and she is seen just as she arrives to CCU. Intubated, unresponsive, resuscitation/stabilization efforts are ongoing.    Allergies:  Bupropion, Chantix [varenicline], Gabapentin, Azithromycin, Levofloxacin, Penicillins, and Sulfa antibiotics    Home Meds:  Medications Prior to Admission   Medication Sig Dispense Refill Last Dose   • aspirin 81 MG chewable tablet Chew 81 mg Daily.   6/29/2021 at Unknown time   • atropine 1 % ophthalmic solution Administer 1 drop into the left eye Daily.   6/29/2021 at Unknown time   • brimonidine (ALPHAGAN) 0.2 % ophthalmic solution Administer 1 drop into the left eye 2 (two) times a day.   6/29/2021 at Unknown time   • carvedilol (COREG) 6.25 MG tablet Take 6.25 mg by mouth 2 (Two) Times a Day With Meals.   6/29/2021 at Unknown time    • cholecalciferol (VITAMIN D3) 25 MCG (1000 UT) tablet Take 2,000 Units by mouth Daily.   2021 at Unknown time   • dorzolamide (TRUSOPT) 2 % ophthalmic solution Administer 1 drop into the left eye 2 (Two) Times a Day.   2021 at Unknown time   • famotidine (PEPCID) 20 MG tablet Take 1 tablet by mouth Daily.   2021 at Unknown time   • Ferric Citrate 1  MG(Fe) tablet Take 1 tablet by mouth 3 (Three) Times a Day With Meals.   2021 at Unknown time   • fluticasone (FLONASE) 50 MCG/ACT nasal spray 2 sprays into the nostril(s) as directed by provider 2 (Two) Times a Day.   2021 at Unknown time   • lactobacillus acidophilus (RISAQUAD) capsule capsule Take 1 capsule by mouth Daily. 30 capsule 2 2021 at Unknown time   • ondansetron (ZOFRAN) 4 MG tablet Take 1 tablet by mouth Every 6 (Six) Hours As Needed for Nausea or Vomiting. 24 tablet 2 2021 at Unknown time   • sertraline (ZOLOFT) 25 MG tablet Take 1 tablet by mouth Daily.   2021 at Unknown time   • sevelamer (RENAGEL) 800 MG tablet Take 800 mg by mouth 3 (Three) Times a Day With Meals.   2021 at Unknown time   • timolol (TIMOPTIC) 0.5 % ophthalmic solution Administer 1 drop into the left eye Every Morning.  12 2021 at Unknown time   • acetaminophen (TYLENOL) 325 MG tablet Take 650 mg by mouth Every 4 (Four) Hours As Needed for Mild Pain  or Fever.   Unknown at Unknown time   • ipratropium-albuterol (DUO-NEB) 0.5-2.5 mg/3 ml nebulizer Take 3 mL by nebulization Every 6 (Six) Hours As Needed for Wheezing or Shortness of Air.   Unknown at Unknown time   • [] vancomycin 50 MG/ML reconstituted solution oral solution reconstituted Take 2.5 mL by mouth Every 6 (Six) Hours for 18 doses. Indications: Clostridium Difficile Infection 45 mL 0        Medicines:  Current Facility-Administered Medications   Medication Dose Route Frequency Provider Last Rate Last Admin   • acetaminophen (TYLENOL) tablet 650 mg  650 mg Oral  Q4H PRN Sarahy Mike, APRN   650 mg at 07/07/21 1741    Or   • acetaminophen (TYLENOL) suppository 650 mg  650 mg Rectal Q4H PRN Sarahy Mike APRN       • aspirin EC tablet 81 mg  81 mg Oral Daily Sarahy Mike, APRN   81 mg at 07/07/21 0840   • atropine 1 % ophthalmic solution 1 drop  1 drop Left Eye Daily Sarahy Mike APRN   1 drop at 07/07/21 0845   • brimonidine (ALPHAGAN) 0.2 % ophthalmic solution 1 drop  1 drop Left Eye BID WoSarahy carmen APRN   1 drop at 07/07/21 2031   • carvedilol (COREG) tablet 6.25 mg  6.25 mg Oral BID With Meals Sarahy Mike APRN   6.25 mg at 07/07/21 1741   • cholestyramine (QUESTRAN) packet 1 packet  1 packet Oral Q8H Sundeep Aldridge MD   1 packet at 07/07/21 2029   • dextrose (D50W) 25 g/ 50mL Intravenous Solution 25 g  25 g Intravenous Q15 Min PRN Sarahy Mike APRN       • dextrose (GLUTOSE) oral gel 15 g  15 g Oral Q15 Min PRN Sarahy Mike APRN       • famotidine (PEPCID) tablet 20 mg  20 mg Oral Daily Sarahy Mike APRN   20 mg at 07/07/21 0841   • glucagon (human recombinant) (GLUCAGEN DIAGNOSTIC) injection 1 mg  1 mg Subcutaneous Q15 Min PRN Sarahy Mike APRN       • heparin (porcine) 5000 UNIT/ML injection 5,000 Units  5,000 Units Subcutaneous Q12H Sarahy Mike APRN   5,000 Units at 07/07/21 2030   • insulin regular (humuLIN R,novoLIN R) injection 2-7 Units  2-7 Units Subcutaneous TID With Meals Bin Guy DO   3 Units at 07/07/21 1741   • melatonin tablet 6 mg  6 mg Oral Nightly PRN Hang Giles MD   6 mg at 07/08/21 0022   • nicotine (NICODERM CQ) 21 MG/24HR patch 1 patch  1 patch Transdermal Q24H Sundeep Aldridge MD   1 patch at 07/04/21 2109   • ondansetron (ZOFRAN) injection 4 mg  4 mg Intravenous Q6H PRN Sarahy Mike, ANTHONY       • Pharmacy Consult   Does not apply Continuous PRN Sundeep Aldridge MD       • pravastatin (PRAVACHOL) tablet 10 mg  10 mg  Oral Nightly Woeltz, Sarahy K, APRN   10 mg at 21   • saccharomyces boulardii (FLORASTOR) capsule 500 mg  500 mg Oral BID Woeltz, Sarahy K, APRN   500 mg at 21   • sertraline (ZOLOFT) tablet 25 mg  25 mg Oral Daily Woeltz, Sarahy K, APRN   25 mg at 21 0841   • sodium chloride 0.9 % flush 10 mL  10 mL Intravenous PRN Woeltz, Sarahy K, APRN       • sodium chloride 0.9 % flush 10 mL  10 mL Intravenous Q12H Woeltz, Sarahy K, APRN   10 mL at 21   • sodium chloride 0.9 % flush 10 mL  10 mL Intravenous PRN Woeltz, Sarahy K, APRN       • timolol (TIMOPTIC) 0.5 % ophthalmic solution 1 drop  1 drop Left Eye QAM Woeltz, Sarahy K, APRN   1 drop at 21 06   • vancomycin oral solution 125 mg  125 mg Oral Q6H Sundeep Aldridge MD   125 mg at 21 06       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    • Chronic otitis media    • Diabetes (CMS/[facility])    • End stage renal disease on dialysis (CMS/[facility])    • Eustachian tube dysfunction    • GERD (gastroesophageal reflux disease)    • Glaucoma    • HTN (hypertension)    • Hyperlipidemia    • Mucoid otitis media    • Noncompliance of patient with renal dialysis (CMS/[facility])    • Tobacco abuse        Past Surgical History:  Past Surgical History:   Procedure Laterality Date   • ARTERIOVENOUS FISTULA     • CATARACT EXTRACTION WITH INTRAOCULAR LENS IMPLANT     •  SECTION     • CHOLECYSTECTOMY     • MYRINGOTOMY W/ TUBES Bilateral    • MYRINGOTOMY W/ TUBES Right    • TUBAL ABDOMINAL LIGATION         Family History  Family History   Problem Relation Age of Onset   • Heart disease Mother    • Diabetes Father    • Colon cancer Neg Hx    • Colon polyps Neg Hx        Social History  Social History     Socioeconomic History   • Marital status: Single     Spouse name: Not on file   • Number of children: Not on file   • Years of education: Not on file   •  Highest education level: Not on file   Tobacco Use   • Smoking status: Current Every Day Smoker     Packs/day: 2.00     Years: 6.00     Pack years: 12.00     Types: Cigarettes   • Smokeless tobacco: Never Used   Vaping Use   • Vaping Use: Never used   Substance and Sexual Activity   • Alcohol use: No   • Drug use: No   • Sexual activity: Defer         Review of Systems:  Unable to obtain due to intubated, unresponsive    Objective:  Patient Vitals for the past 24 hrs:   BP Temp Temp src Pulse Resp SpO2   07/08/21 0037 147/64 96.1 °F (35.6 °C) Oral 63 18 96 %   07/07/21 1500 140/66 98.4 °F (36.9 °C) Oral 79 20 94 %   07/07/21 0840 127/60 98.1 °F (36.7 °C) Oral 72 16 96 %     No intake or output data in the 24 hours ending 07/08/21 0813  General: intubated  Chest:  clear bilaterally  CVS: regular rate and rhythm  Abdominal: soft, nontender, positive bowel sounds  Extremities: cyanotic/dusky  Skin: cool and dry      Labs:  Results from last 7 days   Lab Units 07/03/21  0459 07/02/21  0440   WBC 10*3/mm3 4.80 6.40   HEMOGLOBIN g/dL 10.8* 10.0*   HEMATOCRIT % 36.0 33.7*   PLATELETS 10*3/mm3 198 188         Results from last 7 days   Lab Units 07/07/21  0533 07/03/21  0459 07/02/21  0440   SODIUM mmol/L 132* 132* 136   POTASSIUM mmol/L 5.0 4.8 4.4   CHLORIDE mmol/L 95* 91* 96*   CO2 mmol/L 25.0 28.0 30.0*   BUN mg/dL 21 30* 23   CREATININE mg/dL 3.69* 3.93* 3.24*   CALCIUM mg/dL 9.1 9.1 8.8   GLUCOSE mg/dL 98 122* 139*       Radiology:   Imaging Results (Last 72 Hours)     ** No results found for the last 72 hours. **          Culture:  Blood Culture   Date Value Ref Range Status   06/30/2021 No growth at 5 days  Final   06/30/2021 No growth at 5 days  Final         Assessment   1.  End stage renal disease on HD TTS  2.  Metabolic encephalopathy  3.  Type 2 diabetes   4.  Hypertension with periods of hypotension  5.  Anemia of CKD  6.  COPD  7.  C Diff colitis  8.  Hyponatremia  9.  S/p cardiac arrest on 7/08    Plan:  1.   Patient now in CCU. Stabilization efforts underway. STAT labs pending.   2.  Dialysis is due today but will defer until patient hemodynamically stable  3.  Prognosis uncertain. Discussed with ANTHONY Gutierrez  7/8/2021  08:13 CDT    Electronically signed by Adi Gonzalez MD at 07/08/21 1904     Sundeep Aldridge MD at 07/08/21 0811              [facility]  INPATIENT PROGRESS NOTE    Patient Name: Mini Gentile  Date of Admission: 6/30/2021  Today's Date: 07/08/21  Length of Stay: 8  Primary Care Physician: Bharati Dahl APRN    Subjective   Chief Complaint: Follow-up  HPI   CODE BLUE page heard overhead.  I responded to it  Resuscitation already taking place.        Review of Systems   Patient unable to give any historical details    Objective    Temp:  [96.1 °F (35.6 °C)-98.4 °F (36.9 °C)] 96.1 °F (35.6 °C)  Heart Rate:  [63-79] 63  Resp:  [16-20] 18  BP: (127-147)/(60-66) 147/64  Physical Exam   She is unresponsive  Post resuscitation exam  Her color is much improved  She has agonal breathing  She is intubated and mechanically ventilated.  Initial setting care off RT.  Respiratory rate 26, tidal volume 400, FiO2 at 60, PEEP of 5 SPO2 is 90 to 93%; ET CO2 was in the 50s initially but had gone down to 48.  Blood gas pending  Adequate air exchange positive wheezing, diminished breath sound right mid axillary line (x-ray post intubation noted.  We had to withdraw back the ET tube to about 22 by the lip) follow-up chest x-ray looks improved.  Has pleural effusion on the right  No gross consolidation  S1-S2, regular; pulses present but initially faint as we were transporting her to the unit.  We gave another epi during transport  Telemetry showed sinus rhythm at 72  Soft abdomen, nontender  Significant edema bilateral extremities  Sedated  Results Review:  I have reviewed the labs, radiology results, and  diagnostic studies.    Laboratory Data:   Results from last 7 days   Lab Units 07/03/21  0459 07/02/21  0440   WBC 10*3/mm3 4.80 6.40   HEMOGLOBIN g/dL 10.8* 10.0*   HEMATOCRIT % 36.0 33.7*   PLATELETS 10*3/mm3 198 188        Results from last 7 days   Lab Units 07/07/21  0533 07/03/21  0459 07/02/21  0440   SODIUM mmol/L 132* 132* 136   POTASSIUM mmol/L 5.0 4.8 4.4   CHLORIDE mmol/L 95* 91* 96*   CO2 mmol/L 25.0 28.0 30.0*   BUN mg/dL 21 30* 23   CREATININE mg/dL 3.69* 3.93* 3.24*   CALCIUM mg/dL 9.1 9.1 8.8   GLUCOSE mg/dL 98 122* 139*       Culture Data:   No results found for: BLOODCX, URINECX, WOUNDCX, MRSACX, RESPCX, STOOLCX    Radiology Data:   Imaging Results (Last 24 Hours)     ** No results found for the last 24 hours. **          I have reviewed the patient's current medications.     Assessment/Plan     Active Hospital Problems    Diagnosis    • **Altered mental status    • Fever    • Elevated troponin    • Fx humeral neck, right, with nonunion, subsequent encounter    • Pleural effusion on right    • C. difficile diarrhea    • Chronic anemia    • DM2 (diabetes mellitus, type 2) (CMS/[facility])    • HTN (hypertension)      Problem List  Status post cardiorespiratory arrest (?PEA) -resuscitated for about 10 minutes before return of spontaneous circulation  Hemoptysis  End-stage renal disease on maintenance dialysis  C. difficile associated diarrhea  Chronic anemia  Diabetes mellitus type 2  History of hypertension  Right pleural effusion  History of humeral neck fracture right with nonunion      Cont on post resuscitation effort  Pulmonary consult: hemoptysis; vent management  Prn Levophed  Consider central line if needing pressor; most recent blood pressure is in the 180s.  Could be post resuscitation effect of epinephrine. She has 2 gauge 18 placed during resuscitation effort  STAT labs including type and screen  EKG  Echocardiogram  I spoke to ABRAHAN Lepe.  He claimed that he has been in relationship  with her for the past 20 years and has paperwork.  We discussed about her condition.  She would like to discuss with children subsequent decision on resuscitation effort if the event recurs.  duoneb and solumedrol - wheezing/bronchospasm    aspirin, 81 mg, Oral, Daily  atropine, 1 drop, Left Eye, Daily  brimonidine, 1 drop, Left Eye, BID  carvedilol, 6.25 mg, Oral, BID With Meals  cholestyramine, 1 packet, Oral, Q8H  famotidine, 20 mg, Oral, Daily  heparin (porcine), 5,000 Units, Subcutaneous, Q12H  insulin regular, 2-7 Units, Subcutaneous, TID With Meals  methylPREDNISolone sodium succinate, 125 mg, Intravenous, Once  methylPREDNISolone sodium succinate, 60 mg, Intravenous, Q8H  nicotine, 1 patch, Transdermal, Q24H  pravastatin, 10 mg, Oral, Nightly  saccharomyces boulardii, 500 mg, Oral, BID  sertraline, 25 mg, Oral, Daily  sodium chloride, 10 mL, Intravenous, Q12H  timolol, 1 drop, Left Eye, QAM  vancomycin, 125 mg, Oral, Q6H    Discussed with nursing, nephrology service.    Critical care time spent greater than 30 minutes separate from attendance to CODE BLUE            Discharge Planning: To be determined    Electronically signed by Sundeep Aldridge MD, 07/08/21, 08:11 CDT.  Ekg: NSR, low voltage. No acute sttw changes    I met with family in the meeting room.  This was attended by Sundeep, son Julio Cesar and there granddaughter as well as their spouses.  Family decided to place her NO CPR.  I spoke also to Kevyn.  We found out that patient's potassium was 6.3.  Nephrology made aware.  Plan of management deferred to nephrology  Patient's troponin likewise elevated patient had not received any defibrillation in the process of resuscitation but by itself can cause elevated troponin.  Echocardiogram already requested.  We will check with cardiology for further recommendation.    Electronically signed by Sundeep Aldridge MD, [unfilled], 11:11 CDT.          Electronically signed by Sundeep Aldridge MD  at 07/08/21 1111          Consult Notes (last 24 hours) (Notes from 07/08/21 0736 through 07/09/21 0736)      Elvia Valadez MD at 07/08/21 1621      Consult Orders    1. Inpatient Pulmonology Consult [[id number]] ordered by Sundeep Aldridge MD at 07/08/21 0829                   PULMONARY AND CRITICAL CARE CONSULT - [facility]    Mini Gentile   MR# [id number]  Acct# [id number]  7/8/2021   16:21 CDT    Referring Provider: Sundeep Aldridge*    Chief Complaint: Mechanically ventilated    HPI: We are consulted by Sundeep Aldridge* to see this 61 y.o. female born on 1960.  She developed cardiac arrest in medical floor this morning and was intubated and transferred to the intensive care unit and pulmonary team was consulted for ventilator and critical care management.    Patient is a 61-year-old female with a history of ESRD on dialysis, diabetes, hypertension, and is a nursing home resident.  She has at least 2 admissions last month with multiple medical problems.  This time she was hospitalized on 30 June 2021.     Recently was hospitalized for C. difficile and transient pseudomonal bacteremia.  Patient's bacteremia actually improved prior to antibiotics but she was given a short course of treatment to help prevent any seeding of infection.  She was seen by ID during the stay.  She was discharged back to nursing home on 6/24.    She returned to the [facility] this time with temperature of 102 F noted in the nursing home and she was given Tylenol.  On arrival here she was altered temp of 100.1. She was unable to give history to ER provider and he was told from nursing home that she was having episodes of diarrhea.  During initial evaluation by the admitting hospitalist Dr. Bin Guy she was awake interactive.  She was alert oriented x2.   She still complained of intermittent right shoulder pain.  She has a known shoulder fracture.  She did not have any chest  pain or shortness of breath.  She denied any abdominal pain or nausea.  No focal weakness.  In the ER blood cultures were drawn and she was given a dose of Merrem.  Stool studies also ordered.    730 AM  this morning developed cardiac arrest and CODE BLUE was called.  The code team arrived there hands they bagged the patient with Ambu bag ventilation and chest compressions were initiated.  Family had a PEA..  We did a pulse check and remains no pulse.  She was asystole.  The advanced cardiac life support was continued for at least 10 minutes with return of spontaneous circulation.  Patient in the process of received 2 epinephrine,  sodium bicarbonate and calcium IV pushes.  Dr.Glen Aldridge attending hospitalist running the code eventually intubated the patient.  He needed use a bougie in the second attempt.  She was noted to have bleeding in endotracheal tube soon after placing the tube.    Apparently there was no active source of bleeding identified.  In further reviewing the history it appears patient has a right-sided large pleural effusion and to my knowledge no intervention has been done and pulmonary was never consulted before.   The pleural effusion was also noted in the follow-up chest x-ray after intubation.  Chest x-ray confirmed placement of ET tube.Patient was transferred to critical care unit bed for further management.    Post cardiac arrest stat labs showed she had hyponatremia with serum sodium of 129 potassium of 6.3 and she was scheduled for dialysis this afternoon and was taken for dialysis..  Reviewing the history further it appears patient was a smoker but there is no documented history of COPD apparently she had chronic ear problems and several surgeries done and she also has history of noncompliance to dialysis.  She did not have any recent pulmonary work-up done to my knowledge.  At the time of my visit patient was sedated with propofol drip and was tachypneic and overbreathing the  ventilator.  Endotracheal tube has some bloody drainage noted.  Her hemoglobin was stable 13.4 g and she did not need any blood transfusion.  She was given some morphine for pain control but did not have the fentanyl drip started.  I talked with RN and RT about the respiratory status and ventilator changes.  She was started on routine bronchodilator treatment.  She was noted to have elevated troponin after the cardiac arrest and cardiology has been consulted and they are following the patient.    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   has a past surgical history that includes  section; Cataract extraction w/  intraocular lens implant; Cholecystectomy; AV fistula placement; Myringotomy w/ tubes (Bilateral); Myringotomy w/ tubes (Right); and Tubal ligation.  Allergies   Allergen Reactions   • Bupropion Nausea Only   • Chantix [Varenicline] Nausea And Vomiting     Does not remember   • Gabapentin Hallucinations     hallucinations   • Azithromycin Rash   • Levofloxacin Rash   • Penicillins Rash   • Sulfa Antibiotics Rash     Medications  aspirin, 81 mg, Oral, Daily  atropine, 1 drop, Left Eye, Daily  brimonidine, 1 drop, Left Eye, BID  carvedilol, 6.25 mg, Oral, BID With Meals  cholestyramine, 1 packet, Oral, Q8H  famotidine, 20 mg, Oral, Daily  heparin (porcine), 5,000 Units, Subcutaneous, Q12H  insulin regular, 2-7 Units, Subcutaneous, TID With Meals  ipratropium-albuterol, 3 mL, Nebulization, 4x Daily - RT  methylPREDNISolone sodium succinate, 60 mg, Intravenous, Q8H  nicotine, 1 patch, Transdermal, Q24H  pravastatin, 10 mg, Oral,  Nightly  saccharomyces boulardii, 500 mg, Oral, BID  sertraline, 25 mg, Oral, Daily  sodium chloride, 10 mL, Intravenous, Q12H  timolol, 1 drop, Left Eye, QAM  vancomycin, 125 mg, Oral, Q6H      propofol, 5-50 mcg/kg/min, Last Rate: 15 mcg/kg/min (07/08/21 0848)      Social History   reports that she has been smoking cigarettes. She has a 12.00 pack-year smoking history. She has never used smokeless tobacco. She reports that she does not drink alcohol and does not use drugs.  Family History  family history includes Diabetes in her father; Heart disease in her mother.  Review of Systems:  Cannot obtain due to mechanical ventilated state  Physical Exam:  Temp:  [96.1 °F (35.6 °C)] 96.1 °F (35.6 °C)  Heart Rate:  [] 94  Resp:  [18-26] 26  BP: (136-156)/(64-88) 136/87  FiO2 (%):  [60 %-100 %] 60 %No intake or output data in the 24 hours ending 07/08/21 1621      07/06/21  1712 07/08/21  1221   Weight: 66.1 kg (145 lb 11.6 oz) 65.8 kg (145 lb)     SpO2 Percentage    07/08/21 1148 07/08/21 1510 07/08/21 1520   SpO2: 100% 99% 99%     Physical Exam  Vitals reviewed.   Constitutional:       General: She is not in acute distress.     Appearance: She is ill-appearing.      Comments: Middle-aged  female orally intubated on ventilator sedated.   HENT:      Head: Normocephalic and atraumatic.      Comments: Endotracheal tube in place 7.5 x 23 cm at lips.  There was bloody drainage noted in the endotracheal tube but no casey bleeding identified.     Nose: Nose normal. No congestion.      Mouth/Throat:      Mouth: Mucous membranes are dry.      Pharynx: Oropharynx is clear. No oropharyngeal exudate.   Eyes:      General: No scleral icterus.     Extraocular Movements: Extraocular movements intact.      Conjunctiva/sclera: Conjunctivae normal.      Pupils: Pupils are equal, round, and reactive to light.   Cardiovascular:      Rate and Rhythm: Regular rhythm. Tachycardia present.      Pulses: Normal pulses.      Heart  sounds: Normal heart sounds. No murmur heard.   No gallop.    Pulmonary:      Effort: No respiratory distress.      Breath sounds: No stridor. Rales present. No wheezing.      Comments: Diminished air entry on the lungs on the right side.  Abdominal:      General: Abdomen is flat. Bowel sounds are normal. There is no distension.      Palpations: Abdomen is soft. There is no mass.      Tenderness: There is no abdominal tenderness. There is no guarding.   Genitourinary:     Comments: Not examined  Musculoskeletal:         General: No swelling. Normal range of motion.      Cervical back: Normal range of motion and neck supple. No rigidity or tenderness.      Right lower leg: Edema present.      Left lower leg: Edema present.      Comments: Trace bilateral ankle edema noted.   Lymphadenopathy:      Cervical: No cervical adenopathy.   Skin:     General: Skin is warm and dry.      Capillary Refill: Capillary refill takes less than 2 seconds.      Coloration: Skin is not jaundiced or pale.      Findings: Bruising present. No lesion or rash.   Neurological:      General: No focal deficit present.      Mental Status: Mental status is at baseline.      Cranial Nerves: No cranial nerve deficit.      Motor: Weakness present.      Comments: Could not be assessed.   Psychiatric:      Comments: Could not be assessed.       Ventilator Settings:      Orally intubated with size 7.5 endotracheal tube 3 cm the lips.    Resp Rate (Set): 26     FiO2 (%): 60 %  PEEP/CPAP (cm H2O): 5 cm H20  Minute Ventilation (L/min) (Obs): 10.8 L/min  Resp Rate (Observed) Vent: 26  I:E Ratio (Set): 1:0.76  I:E Ratio (Obs): 1:1.20  PIP Observed (cm H2O): 20 cm H2O        Results from last 7 days   Lab Units 07/08/21  0830 07/03/21  0459 07/02/21  0440   WBC 10*3/mm3 10.66 4.80 6.40   HEMOGLOBIN g/dL 13.4 10.8* 10.0*   PLATELETS 10*3/mm3 199 198 188     Results from last 7 days   Lab Units 07/08/21  0946 07/07/21  0533 07/03/21  0459   SODIUM mmol/L 129*  132* 132*   POTASSIUM mmol/L 6.3* 5.0 4.8   CO2 mmol/L 19.0* 25.0 28.0   BUN mg/dL 29* 21 30*   CREATININE mg/dL 4.31* 3.69* 3.93*   GLUCOSE mg/dL 145* 98 122*     Results from last 7 days   Lab Units 07/08/21  1108   PH, ARTERIAL pH units 7.328*   PCO2, ARTERIAL mm Hg 43.4   PO2 ART mm Hg 172.0*   FIO2 % 100     No results found for: BLOODCX, URINECX, WOUNDCX, MRSACX, RESPCX, STOOLCX  Lab Results   Component Value Date    PROBNP 58,602.0 (H) 04/03/2021     Recent radiology:   Imaging Results (Last 72 Hours)     Procedure Component Value Units Date/Time    XR Chest 1 View [[id number]] Collected: 07/08/21 0817     Updated: 07/08/21 0829    Narrative:      EXAMINATION: XR CHEST 1 VW-  7/8/2021 8:17 AM CDT 1 view     HISTORY: code blue; R41.82-Altered mental status, unspecified;  A41.9-Sepsis, unspecified organism; Z55-Ficxnee effusion, not elsewhere  classified; R13.10-Dysphagia, unspecified; Z78.9-Other specified health  status; Z74.09-Other reduced mobility     COMPARISON: 06/30/2021.     FINDINGS:   Endotracheal tube tip is seen projecting 3.9 cm above the herman.  Vascular stent is seen in the LEFT upper chest. There is redemonstration  of what appears to be a large RIGHT pleural effusion, similar to  06/30/2021. Pulmonary vascular congestion suspected bilaterally with  some mild interstitial markings at the periphery of the LEFT lung.      Extensive chronic posttraumatic changes to the proximal RIGHT humerus,  similar to the shoulder radiograph from 06/30/2021.          Impression:         1.  Very similar appearance of the chest when compared to the study from  06/30/2021. Large RIGHT pleural effusion.     2.  Findings of interstitial edema and pulmonary vascular congestion are  also again seen suggestive of mild/early changes of congestive heart  failure and pulmonary edema.     3.  Endotracheal tube as discussed above.        This report was finalized on 07/08/2021 08:20 by Dr. Gordo Le MD.        My  radiograph interpretation/independent review of imaging: Agree with current interpretation.  Other test results (not lab or imaging): Results for orders placed during the hospital encounter of 06/30/21    Adult Transthoracic Echo Complete W/ Cont if Necessary Per Protocol    Interpretation Summary  · Left ventricular systolic function is normal. Left ventricular ejection fraction appears to be 61 - 65%.  · Left ventricular wall thickness is consistent with mild concentric hypertrophy.  · The right ventricular cavity is borderline dilated. Borderline low right ventricular systolic function noted.  · Mild mitral valve regurgitation is present.  · Trace tricuspid valve regurgitation is present. Estimated right ventricular systolic pressure from tricuspid regurgitation is mildly elevated (35-45 mmHg).  · There is a right pleural effusion.  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   • Eustachian tube dysfunction   • Chronic otitis media   • Pneumonia of left upper lobe due to Streptococcus (CMS/HCC)   • End stage renal failure on dialysis (CMS/HCC)   • Diabetes (CMS/HCC)   • Glaucoma   • HTN (hypertension)   • Proteinuria   • DM2 (diabetes mellitus, type 2) (CMS/HCC)   • Chronic anemia   • Acute pulmonary edema (CMS/HCC)   • Non-compliance with renal dialysis (CMS/HCC)   • Elevated troponin due to ESRD    • Hyperkalemia   • Encephalopathy, metabolic, due to uremia   • High anion gap metabolic acidosis due to uremia   • Respiratory distress   • Acute diastolic heart failure (CMS/HCC)   • Lymph node enlargement, left axillary measuring 1.1 cm -follow-up for primary care   • Nausea vomiting and diarrhea   • Acute diastolic heart failure (CMS/HCC)   • Diabetes mellitus with ophthalmic complication (CMS/HCC)   • Tobacco abuse   • Urinary tract infection  with hematuria   • Pneumonia due to infectious organism   • Abnormal urine findings   • Closed fracture of right proximal humerus   • Hyponatremia   • Hypothyroidism (acquired)   • Forehead laceration secondary to fall   • Heme positive stool   • Dialysis AV fistula malfunction, initial encounter (CMS/[facility])   • Aspiration into airway   • Bronchospasm, acute   • Altered mental status   • Protein-calorie malnutrition (CMS/[facility])   • Pupil irregular   • Sepsis (CMS/[facility])   • Hypoglycemia   • Hyponatremia   • Diarrhea   • Hypokalemia   • C. difficile diarrhea   • Bacteremia due to Pseudomonas   • Fever   • Elevated troponin   • Fx humeral neck, right, with nonunion, subsequent encounter   • Pleural effusion on right     Pulmonary Assessment:  SEVERE ACUTE RESPIRATORY FAILURE REQUIRING MECHANICAL VENTILATION  This is a threat to life or pulmonary function, high risk, due to acute hypoxic respiratory failure s/p cardiac arrest and asystole requiring CPR and intubation.    New problem (to me), with additional workup planned: Large right-sided pleural effusion, COPD, history of tobacco abuse, possible chronic respiratory failure.  New problem (to me), no additional workup planned: Hypertension, diabetes mellitus, chronic kidney disease stage V dialysis dependent, altered mental status, hyponatremia and hyperkalemia, recent Pseudomonas bacteremia and sepsis and C. difficile colitis.  Elevated troponin.  Other problems either stable, failing to improve or worsenin. Acute hypoxic respiratory failure.  2. Intubation mechanical ventilation  3. Asystole and pulses electrical activity and cardiac arrest status post CPR  4. Questionable traumatic intubation with bleeding in the endotracheal tube  5. Large right-sided pleural effusion  6. Chronic kidney disease and end-stage renal disease stage V on hemodialysis  7. Elevated troponin after cardiac arrest most likely secondary to renal  disease  8. Hypertension  9. Hyperlipidemia  10. Diabetes mellitus type 2  11. Chronic obstructive pulmonary disease likely with history of tobacco use  12. Encephalopathy and altered mental status  13. Recent history of Pseudomonas bacteremia and C. difficile colitis  14. History of noncompliance to treatment    Recommend/plan:   Ventilator order set, including intravenous narcotics, benzodiazepines (that is controlled drugs) for sedation and ventilator tolerance  Chest X-ray in the morning tomorrow  Arterial blood gas analysis in the morning tomorrow  Additional plan:  · Patient had cardiopulmonary arrest in the floor most likely from electrolyte abnormality.  She was noted to have hyponatremia and hyperkalemia and is a known end-stage renal disease patient with hemodialysis dependence.  · He is already intubated and will remain on the ventilator and will try to wean her off when she is more stable.  Continue assist-control volume control ventilation and keep her sedated with propofol and start fentanyl drip if needed.  She is currently getting morphine for the pain control and air hunger.  · Continue current ventilator settings and continue treating PEEP and FiO2 as  tolerated.  She is empirically started on Solu-Medrol which could be discontinued.  I do not see any evidence of bronchospasm at this moment.  · Patient will be getting routine bronchodilator treatment and will need further work-up for underlying COPD when she is more stable.  Her endotracheal tube bleeding is controlled now and will do the frequent bronchial washing and lavage and will monitor the hemoglobin.  I do not believe she will need a bronchoscopy at this moment.  She already has a stable hemoglobin and doing well on the ventilator.  · She had a large pleural effusion on the right side and also history of Pseudomonas bacteremia and may have a right-sided pneumonia with chronic pleural effusion which could be empyema or complex management of  confusion.  I discussed the chest x-ray finding and reviewed the old chest x-ray and CT scan with Dr. Jaden Frausto the cardiothoracic surgeon.  · Patient may need a thoracentesis at some point but as it is a chronic pleural effusion after thoracentesis she may have a trapped lung and further problems so we are holding off.  She does not appear to be infected at this moment and she has normal white cell count and she is stable on the ventilator.  If she improves we will get a cardiothoracic surgery for further intervention with drainage of the pleural effusion and she may need further work-up.  · Hemodialysis be continued per nephrology.  Patient had a cardiac arrest and PEA in the medical floor and had a prolonged resuscitation and possible anoxic encephalopathy needs to be considered.  Patient will not have any active intervention at this moment to my knowledge and cardiology is already seeing her elevated troponin most likely from renal issues.  · Patient remains on ventilator tube feeding will be started.  She will need pain and anxiety control and DVT and stress ulcer prophylaxis needs to be addressed.  She will need daily labs and imaging studies while on ventilator.  · Patient will need her CODE STATUS to be addressed.  She has got multiple chronic medical problems with noncompliance and had a prolonged cardiac resuscitation with possibility of poor outcome.  She is still a full code at this moment.    · Her overall prognosis seems guarded to poor.  We will continue following her and make further recommendations.  · Time spent in seeing this patient is a pulmonary critical care consult in intensive care unit was 60 minutes.  30 minutes spent in coordinating the treatment plan and discussing the care plan with RN and RT and Dr. Frausto.    Thank you for this consult.  We will follow along.    Electronically signed by     Elvia Valadez MD   Pulmonologist/intensivist.    on 7/8/2021 at 16:21 CDT    Electronically  "signed by Elvia Valadez MD at 07/08/21 1722     Fany Crandall APRN at 07/08/21 1147      Consult Orders    1. Inpatient Cardiology Consult [[id number]] ordered by Sundeep Aldridge MD at 07/08/21 1045          Attestation signed by Bin Rasheed MD at 07/08/21 1346      I have seen and evaluated this patient personally.  I agree with the findings, assessment and plan as outlined by ANTHONY Novoa.  In addition, I have the following to add.    Chief Complaint   Patient presents with   • Fever   • Altered Mental Status     S: This is a 61-year-old female who we are asked to see regarding a positive troponin level after a cardiac arrest at this morning.  The patient was on the floor, being treated for C. difficile when this morning, the patient was noted to have bradycardia on her cardiac monitor and then went unresponsive.  She did have ACLS protocol initiated with a return of circulation and has been transferred to the ICU.  At this point, she remains intubated.  Reportedly, the patient has no prior cardiac history.    All elements of the past medical history, past surgical history, medications, allergies, social history, family history and review of systems are unobtainable from the patient as she is intubated.    O:  /87   Pulse 85   Temp 96.1 °F (35.6 °C) (Oral)   Resp 18   Ht 147.3 cm (57.99\")   Wt 65.8 kg (145 lb)   SpO2 100%   BMI 30.31 kg/m²   Temp:  [96.1 °F (35.6 °C)-98.4 °F (36.9 °C)] 96.1 °F (35.6 °C)  Heart Rate:  [] 85  Resp:  [18-20] 18  BP: (136-156)/(64-88) 136/87  FiO2 (%):  [60 %-100 %] 60 %    Gen.: Acutely ill-appearing, lying with the head of bed at 30 degrees  HEENT: ET tube in place with frothy output noted and dried blood around the oral surface  Neck: No obvious elevation of JVP, no audible bruits  CV: Regular rate and rhythm with no appreciable murmurs  Pulmonary: Tachypnea noted, diffuse rhonchi and wheezes with decreased breath sounds noted in " the right lower lobe  GI: Somewhat firm in bilateral lower quadrants, soft at midline, hypoactive bowel sounds  Extremities: Warm and well-perfused, no significant edema other than trace pretibial  Neurologic: Intubated    Diagnostic Data:    Lab Results   Component Value Date    WBC 10.66 07/08/2021    HGB 13.4 07/08/2021    HCT 44.7 07/08/2021    .4 (H) 07/08/2021     07/08/2021     Lab Results   Component Value Date    GLUCOSE 145 (H) 07/08/2021    CALCIUM 9.7 07/08/2021     (L) 07/08/2021    K 6.3 (C) 07/08/2021    CO2 19.0 (L) 07/08/2021    CL 91 (L) 07/08/2021    BUN 29 (H) 07/08/2021    CREATININE 4.31 (H) 07/08/2021    EGFRIFAFRI  07/08/2021      Comment:      <15 Indicative of kidney failure.    EGFRIFNONA 10 (L) 07/08/2021    BCR 6.7 (L) 07/08/2021    ANIONGAP 19.0 (H) 07/08/2021      ECG today at 8:27 AM with my interpretation as follows: Normal sinus rhythm, 85 bpm, poor R wave progression with nonspecific ST changes    Echo:  · Left ventricular systolic function is normal. Left ventricular ejection fraction appears to be 61 - 65%.  · Left ventricular wall thickness is consistent with mild concentric hypertrophy.  · The right ventricular cavity is borderline dilated. Borderline low right ventricular systolic function noted.  · Mild mitral valve regurgitation is present.  · Trace tricuspid valve regurgitation is present. Estimated right ventricular systolic pressure from tricuspid regurgitation is mildly elevated (35-45 mmHg).  · There is a right pleural effusion.      ASSESSMENT/PLAN:    1.  PEA arrest  2.  Elevated troponin  3.  End-stage renal disease  4.  Hyperkalemia  5.  Hyponatremia  6.  C. difficile  7.  Essential hypertension  8.  Type 2 diabetes mellitus    -We are asked to see this patient with an elevated troponin after a PEA arrest this morning.  Review of telemetry shows that the patient did have bradycardia, subsequently followed by PEA.  This may have been metabolic,  especially given her hyperkalemia this morning and metabolic acidosis.  The troponin elevation is likely related to her cardiac arrest, also somewhat elevated in the setting of her chronic kidney disease, not considered a manifestation of an acute myocardial infarction, however.  Post arrest EKG did not show any acute ST changes.  -Echocardiogram shows normal systolic function with mild mitral valve regurgitation, otherwise no significant findings from a cardiac standpoint.  -At this time, care is largely supportive.  The patient will not be coded again but is still with full supportive measures otherwise.  -No further cardiac work-up at this time.  Patient's prognosis remains poor/guarded at this time.  Please feel free to call if there are further questions.                      [facility] CONSULT NOTE    Referring Provider: Sundeep Aldridge    Reason for Consultation: elevated troponin    Chief Complaint   Patient presents with   • Fever   • Altered Mental Status       Subjective .     History of present illness:  Mini Gentile is a 61 y.o. female who was initially hospitalized last month with mental status, fever.  She was treated for C. difficile and bacteremia.  She was discharged back to the nursing home on 6/24/2021.  She presented back to the emergency room on 6/30/2021 with complaints of fever and altered mental status.  She has been hospitalized since that time.  She had been improving, she had been hemodynamically stable.  She was on oral occasions for treatment of C. difficile.  And reportedly nursing tells me was close to being discharged back to the skilled nursing facility when she came.  She does have end-stage renal disease and is a chronic hemodialysis patient monitored through [facility] kidney specialist.    Earlier this morning the monitor room called the nursing staff on the third floor due to significant bradycardia noted on the monitor (heart rate reported to  be down to the 20s).  Nursing staff went into check on the patient who was unresponsive.    Successful ACLS protocol with ROSC.  She was transferred to the CCU.  She is intubated.  Cardiology was consulted due to abnormal labs, specifically an elevated troponin.      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    • Chronic otitis media    • Diabetes (CMS/[facility])    • End stage renal disease on dialysis (CMS/[facility])    • Eustachian tube dysfunction    • GERD (gastroesophageal reflux disease)    • Glaucoma    • HTN (hypertension)    • Hyperlipidemia    • Mucoid otitis media    • Noncompliance of patient with renal dialysis (CMS/[facility])    • Tobacco abuse    ,   Past Surgical History:   Procedure Laterality Date   • ARTERIOVENOUS FISTULA     • CATARACT EXTRACTION WITH INTRAOCULAR LENS IMPLANT     •  SECTION     • CHOLECYSTECTOMY     • MYRINGOTOMY W/ TUBES Bilateral    • MYRINGOTOMY W/ TUBES Right    • TUBAL ABDOMINAL LIGATION     ,   Family History   Problem Relation Age of Onset   • Heart disease Mother    • Diabetes Father    • Colon cancer Neg Hx    • Colon polyps Neg Hx    ,   Social History     Tobacco Use   • Smoking status: Current Every Day Smoker     Packs/day: 2.00     Years: 6.00     Pack years: 12.00     Types: Cigarettes   • Smokeless tobacco: Never Used   Vaping Use   • Vaping Use: Never used   Substance Use Topics   • Alcohol use: No   • Drug use: No   ,     Medications  Current Facility-Administered Medications   Medication Dose Route Frequency Provider Last Rate Last Admin   • acetaminophen (TYLENOL) tablet 650 mg  650 mg Oral Q4H PRN Sarahy Mike APRN   650 mg at 21 1741    Or   • acetaminophen (TYLENOL) suppository 650 mg  650 mg Rectal Q4H PRN Sarahy Mike APRN       • aspirin EC tablet 81 mg  81 mg Oral Daily Sarahy Mike APRN   81 mg at 21 0840   • atropine 1 % ophthalmic solution 1 drop  1 drop Left Eye Daily  Sarahy Mike APRN   1 drop at 07/07/21 0845   • brimonidine (ALPHAGAN) 0.2 % ophthalmic solution 1 drop  1 drop Left Eye BID Sarahy Mike APRN   1 drop at 07/07/21 2031   • carvedilol (COREG) tablet 6.25 mg  6.25 mg Oral BID With Meals Sarahy Mike APRN   6.25 mg at 07/07/21 1741   • cholestyramine (QUESTRAN) packet 1 packet  1 packet Oral Q8H Sundeep Aldridge MD   1 packet at 07/07/21 2029   • dextrose (D50W) 25 g/ 50mL Intravenous Solution 25 g  25 g Intravenous Q15 Min PRN Sarahy Mike APRN       • dextrose (GLUTOSE) oral gel 15 g  15 g Oral Q15 Min PRN Sarahy Mike APRN       • famotidine (PEPCID) tablet 20 mg  20 mg Oral Daily Sarahy Mike APRN   20 mg at 07/07/21 0841   • glucagon (human recombinant) (GLUCAGEN DIAGNOSTIC) injection 1 mg  1 mg Subcutaneous Q15 Min PRN Sarahy Mike APRN       • heparin (porcine) 5000 UNIT/ML injection 5,000 Units  5,000 Units Subcutaneous Q12H Sarahy Mike APRN   5,000 Units at 07/07/21 2030   • insulin regular (humuLIN R,novoLIN R) injection 2-7 Units  2-7 Units Subcutaneous TID With Meals Bin Guy DO   3 Units at 07/07/21 1741   • melatonin tablet 6 mg  6 mg Oral Nightly PRN Hang Giles MD   6 mg at 07/08/21 0022   • methylPREDNISolone sodium succinate (SOLU-Medrol) injection 125 mg  125 mg Intravenous Once Sundeep Aldridge MD       • methylPREDNISolone sodium succinate (SOLU-Medrol) injection 60 mg  60 mg Intravenous Q8H Sundeep Aldridge MD       • Morphine sulfate (PF) injection 2 mg  2 mg Intravenous Q2H PRN Elvia Valadez MD       • nicotine (NICODERM CQ) 21 MG/24HR patch 1 patch  1 patch Transdermal Q24H Sundeep Aldridge MD   1 patch at 07/04/21 2109   • ondansetron (ZOFRAN) injection 4 mg  4 mg Intravenous Q6H PRN Sarahy Mike APRN       • pravastatin (PRAVACHOL) tablet 10 mg  10 mg Oral Nightly Sarahy Mike APRN   10 mg at 07/07/21 2030   •  propofol (DIPRIVAN) infusion 10 mg/mL 100 mL  5-50 mcg/kg/min Intravenous Titrated Sundeep Aldridge MD 5.95 mL/hr at 07/08/21 0848 15 mcg/kg/min at 07/08/21 0848   • saccharomyces boulardii (FLORASTOR) capsule 500 mg  500 mg Oral BID Woeltz, Sarahy K, APRN   500 mg at 07/07/21 2030   • sertraline (ZOLOFT) tablet 25 mg  25 mg Oral Daily Woeltz, Sarahy K, APRN   25 mg at 07/07/21 0841   • sodium chloride 0.9 % flush 10 mL  10 mL Intravenous PRN Woeltz, Sarahy K, APRN       • sodium chloride 0.9 % flush 10 mL  10 mL Intravenous Q12H Woeltz, Sarahy K, APRN   10 mL at 07/05/21 2017   • sodium chloride 0.9 % flush 10 mL  10 mL Intravenous PRN Woeltz, Sarahy K, APRN       • timolol (TIMOPTIC) 0.5 % ophthalmic solution 1 drop  1 drop Left Eye QAM WoeltSamantha montgomerySarahy K, APRN   1 drop at 07/08/21 0619   • vancomycin oral solution 125 mg  125 mg Oral Q6H Sundeep Aldridge MD   125 mg at 07/08/21 0619       Allergies:  Bupropion, Chantix [varenicline], Gabapentin, Azithromycin, Levofloxacin, Penicillins, and Sulfa antibiotics    Review of Systems  Review of Systems   Unable to perform ROS: intubated       Objective     Physical Exam:  Patient Vitals for the past 24 hrs:   BP Temp Temp src Pulse Resp SpO2   07/08/21 1100 147/83 -- -- 78 -- 100 %   07/08/21 1000 136/87 -- -- 68 -- 100 %   07/08/21 0900 156/88 -- -- 75 -- 98 %   07/08/21 0752 -- -- -- (!) 123 -- 92 %   07/08/21 0037 147/64 96.1 °F (35.6 °C) Oral 63 18 96 %   07/07/21 1500 140/66 98.4 °F (36.9 °C) Oral 79 20 94 %     Vitals reviewed.   Constitutional:       Appearance: Well-developed and normal weight. Toxic-appearing and chronically ill-appearing.      Interventions: Intubated and restrained.   HENT:      Head: Normocephalic and atraumatic.   Pulmonary:      Effort: Intubated.      Breath sounds: Scattered Wheezing present. Diffuse Rhonchi present.   Cardiovascular:      Normal rate. Regular rhythm.   Edema:     Peripheral edema present.      Pretibial: bilateral trace edema of the pretibial area.     Ankle: bilateral trace edema of the ankle.  Musculoskeletal:      Cervical back: Normal range of motion and neck supple. Skin:     General: Skin is dry.         Results Review:   I reviewed the patient's new clinical results.    Lab Results (last 72 hours)     Procedure Component Value Units Date/Time    Blood Gas, Arterial - [[id number]]  (Abnormal) Collected: 07/08/21 1108    Specimen: Arterial Blood Updated: 07/08/21 1115     Site Right Radial     Rustam's Test Positive     pH, Arterial 7.328 pH units      Comment: 84 Value below reference range        pCO2, Arterial 43.4 mm Hg      pO2, Arterial 172.0 mm Hg      Comment: 83 Value above reference range        HCO3, Arterial 22.8 mmol/L      Base Excess, Arterial -3.2 mmol/L      Comment: 84 Value below reference range        O2 Saturation, Arterial >100.1 %      Comment: 93 Value above reportable range > 100.1        Temperature 37.0 C      Barometric Pressure for Blood Gas 748 mmHg      Modality Ventilator     FIO2 100 %      Ventilator Mode AC     Set Tidal Volume 400     Set Mech Resp Rate 26.0     PEEP 5.0     Collected by [id number]     Comment: Meter: [id number]     :  [id number]        pCO2, Temperature Corrected 43.4 mm Hg      pH, Temp Corrected 7.328 pH Units      pO2, Temperature Corrected 172 mm Hg     POC Glucose Once [[id number]]  (Normal) Collected: 07/08/21 1103    Specimen: Blood Updated: 07/08/21 1115     Glucose 128 mg/dL      Comment: : [id number] Lilliana CheneyAura ID: [id number]       Comprehensive Metabolic Panel [[id number]]  (Abnormal) Collected: 07/08/21 0946    Specimen: Blood Updated: 07/08/21 1027     Glucose 145 mg/dL      BUN 29 mg/dL      Creatinine 4.31 mg/dL      Sodium 129 mmol/L      Potassium 6.3 mmol/L      Comment: Slight hemolysis detected by analyzer. Results may be affected.        Chloride 91 mmol/L      CO2 19.0 mmol/L      Calcium 9.7 mg/dL       Total Protein 6.6 g/dL      Albumin 4.00 g/dL      ALT (SGPT) 11 U/L      AST (SGOT) 30 U/L      Alkaline Phosphatase 288 U/L      Total Bilirubin 0.5 mg/dL      eGFR Non African Amer 10 mL/min/1.73      Comment: <15 Indicative of kidney failure.        eGFR   Amer --     Comment: <15 Indicative of kidney failure.        Globulin 2.6 gm/dL      A/G Ratio 1.5 g/dL      BUN/Creatinine Ratio 6.7     Anion Gap 19.0 mmol/L     Narrative:      GFR Normal >60  Chronic Kidney Disease <60  Kidney Failure <15      Troponin [[id number]]  (Abnormal) Collected: 07/08/21 0946    Specimen: Blood Updated: 07/08/21 1025     Troponin T 0.091 ng/mL     Narrative:      Troponin T Reference Range:  <= 0.03 ng/mL-   Negative for AMI  >0.03 ng/mL-     Abnormal for myocardial necrosis.  Clinicians would have to utilize clinical acumen, EKG, Troponin and serial changes to determine if it is an Acute Myocardial Infarction or myocardial injury due to an underlying chronic condition.       Results may be falsely decreased if patient taking Biotin.      CBC (No Diff) [[id number]]  (Abnormal) Collected: 07/08/21 0830    Specimen: Blood Updated: 07/08/21 0914     WBC 10.66 10*3/mm3      RBC 4.41 10*6/mm3      Hemoglobin 13.4 g/dL      Hematocrit 44.7 %      .4 fL      MCH 30.4 pg      MCHC 30.0 g/dL      RDW 17.9 %      RDW-SD 66.9 fl      MPV 10.1 fL      Platelets 199 10*3/mm3     aPTT [[id number]]  (Normal) Collected: 07/08/21 0843    Specimen: Blood Updated: 07/08/21 0902     PTT 32.6 seconds     Protime-INR [[id number]]  (Abnormal) Collected: 07/08/21 0843    Specimen: Blood Updated: 07/08/21 0902     Protime 15.4 Seconds      INR 1.32    POC Glucose Once [[id number]]  (Abnormal) Collected: 07/08/21 0740    Specimen: Blood Updated: 07/08/21 0754     Glucose 157 mg/dL      Comment: : [id number] Messina LoriMeter ID: [id number]       POC Glucose Once [[id number]]  (Abnormal) Collected: 07/07/21 2000    Specimen: Blood Updated:  07/07/21 2011     Glucose 207 mg/dL      Comment: : [id number] Dufault MariahMeter ID: [id number]       POC Glucose Once [[id number]]  (Abnormal) Collected: 07/07/21 1715    Specimen: Blood Updated: 07/07/21 1726     Glucose 223 mg/dL      Comment: : [id number] Burgess YungerstonMeter ID: [id number]       POC Glucose Once [[id number]]  (Abnormal) Collected: 07/07/21 1236    Specimen: Blood Updated: 07/07/21 1257     Glucose 153 mg/dL      Comment: : [id number] Hoda LindsayMeter ID: [id number]       POC Glucose Once [[id number]]  (Normal) Collected: 07/07/21 0840    Specimen: Blood Updated: 07/07/21 0850     Glucose 125 mg/dL      Comment: : [id number] Hoda LindsayMeter ID: [id number]       Basic Metabolic Panel [[id number]]  (Abnormal) Collected: 07/07/21 0533    Specimen: Blood Updated: 07/07/21 0636     Glucose 98 mg/dL      BUN 21 mg/dL      Creatinine 3.69 mg/dL      Sodium 132 mmol/L      Potassium 5.0 mmol/L      Chloride 95 mmol/L      CO2 25.0 mmol/L      Calcium 9.1 mg/dL      eGFR   Amer --     Comment: <15 Indicative of kidney failure.        eGFR Non African Amer 12 mL/min/1.73      Comment: <15 Indicative of kidney failure.        BUN/Creatinine Ratio 5.7     Anion Gap 12.0 mmol/L     Narrative:      GFR Normal >60  Chronic Kidney Disease <60  Kidney Failure <15      POC Glucose Once [[id number]]  (Abnormal) Collected: 07/06/21 1933    Specimen: Blood Updated: 07/06/21 1944     Glucose 226 mg/dL      Comment: : [id number] Dufault MariahMeter ID: [id number]       POC Glucose Once [[id number]]  (Abnormal) Collected: 07/06/21 1649    Specimen: Blood Updated: 07/06/21 1700     Glucose 320 mg/dL      Comment: : AWTABITHA Calvert ID: [id number]       POC Glucose Once [[id number]]  (Abnormal) Collected: 07/06/21 1404    Specimen: Blood Updated: 07/06/21 1425     Glucose 288 mg/dL      Comment: : BETHANIE Calvert ID: [id number]       POC Glucose  Once [[id number]]  (Abnormal) Collected: 07/06/21 1003    Specimen: Blood Updated: 07/06/21 1017     Glucose 185 mg/dL      Comment: : AWTABITHA Calvert ID: [id number]       POC Glucose Once [[id number]]  (Abnormal) Collected: 07/05/21 2015    Specimen: Blood Updated: 07/05/21 2026     Glucose 211 mg/dL      Comment: : [id number] Jorge Luis Alanis ID: [id number]       POC Glucose Once [[id number]]  (Abnormal) Collected: 07/05/21 1723    Specimen: Blood Updated: 07/05/21 1734     Glucose 149 mg/dL      Comment: : [id number] Dominique Osorioer ID: [id number]             Lab Results   Component Value Date    ECHOEFEST 55 01/20/2020       Imaging Results (Last 72 Hours)     Procedure Component Value Units Date/Time    XR Chest 1 View [[id number]] Collected: 07/08/21 0817     Updated: 07/08/21 0829    Narrative:      EXAMINATION: XR CHEST 1 VW-  7/8/2021 8:17 AM CDT 1 view     HISTORY: code blue; R41.82-Altered mental status, unspecified;  A41.9-Sepsis, unspecified organism; F82-Khfcbop effusion, not elsewhere  classified; R13.10-Dysphagia, unspecified; Z78.9-Other specified health  status; Z74.09-Other reduced mobility     COMPARISON: 06/30/2021.     FINDINGS:   Endotracheal tube tip is seen projecting 3.9 cm above the herman.  Vascular stent is seen in the LEFT upper chest. There is redemonstration  of what appears to be a large RIGHT pleural effusion, similar to  06/30/2021. Pulmonary vascular congestion suspected bilaterally with  some mild interstitial markings at the periphery of the LEFT lung.      Extensive chronic posttraumatic changes to the proximal RIGHT humerus,  similar to the shoulder radiograph from 06/30/2021.          Impression:         1.  Very similar appearance of the chest when compared to the study from  06/30/2021. Large RIGHT pleural effusion.     2.  Findings of interstitial edema and pulmonary vascular congestion are  also again seen suggestive of mild/early changes of  congestive heart  failure and pulmonary edema.     3.  Endotracheal tube as discussed above.        This report was finalized on 07/08/2021 08:20 by Dr. Gordo Le MD.        Assessment/Plan    1. Status post PEA arrest: Patient had a bradycardic episode on telemetry, monitoring notified nursing, nursing checked on the patient was found to be unresponsive. CODE BLUE was called. CPR was initiated, ACLS protocol was started 0738, ROSC achieved. Code ended 0800.    2. Elevated troponin: The patient was intubated during the time of cardiopulmonary resuscitation. Prior to this she had not complained of chest pain, per nursing notes in the chart at least. Troponin was checked after code was completed and found to be elevated. She did receive CPR during that time. At this time would consider her elevated troponin to be secondary to recent CPR resuscitation.    3. End-stage renal disease: The patient is a hemodialysis patient and is due for hemodialysis today. Her creatinine is 4.31, potassium 6.3. She appears volume overloaded on exam.    4. C. Difficile: She had been hospitalized prior to her arrest for treatment of C. difficile and pseudomonal bacteria.    5.  Hypertension: stable    6.  DM: chronic, stable    The patient's family has changed her CODE STATUS to no CPR with full interventions.          No further cardiac testing at this time.  We can follow-up after the results of the echocardiogram are available.  Further recommendations to follow by Dr. Rasheed.          Electronically signed by ANTHONY Vega, 07/08/21, 11:48 AM CDT.      Please note this cardiology consultation note is the result of a face to face consultation with the patient, in addition to reviewing medical records at length by myself, ANTHONY Vega.       Time: 30 minutes              Electronically signed by Bin Rasheed MD at 07/08/21 2181

## 2021-07-09 NOTE — PROGRESS NOTES
1           [facility] Medicine Services  INPATIENT PROGRESS NOTE    Patient Name: Mini Gentile  Date of Admission: 6/30/2021  Today's Date: 07/09/21  Length of Stay: 9  Primary Care Physician: Bharati Dahl APRN    Subjective   Chief Complaint: Follow-up  HPI   Patient is on mechanical ventilator status post asystole and cardiac resuscitation for 10 minutes with return of spontaneous circulation.  She had episode of hemoptysis post extubation.  PT/INR yesterday were 15.4/1.32 while PTT was 32.6.  Her hemoglobin was 13.4 while platelet of 199  She had elevated troponin.  She did not have any EKG changes to suggest acute ischemia  Echocardiogram done yesterday: From the echocardiogram there was no description of wall motion abnormalities.  Results for orders placed during the hospital encounter of 06/30/21    Adult Transthoracic Echo Complete W/ Cont if Necessary Per Protocol    Interpretation Summary  · Left ventricular systolic function is normal. Left ventricular ejection fraction appears to be 61 - 65%.  · Left ventricular wall thickness is consistent with mild concentric hypertrophy.  · The right ventricular cavity is borderline dilated. Borderline low right ventricular systolic function noted.  · Mild mitral valve regurgitation is present.  · Trace tricuspid valve regurgitation is present. Estimated right ventricular systolic pressure from tricuspid regurgitation is mildly elevated (35-45 mmHg).  · There is a right pleural effusion.    Pulmonary consulted for vent management  Patient also had hyperkalemia.  She received during code sodium bicarbonate and calcium on top of the epinephrine.  Nephrology following.  Patient is end-stage renal disease on dialysis.  She is normally dialyzed on Tuesday Thursday and Saturday    I gave her doses of Solu-Medrol yesterday as patient heard to be wheezing and likely has bronchospasm.    She is on vancomycin p.o. for C. difficile  colitis.  She had recent transient Pseudomonas bacteremia from last hospitalization and completed course of treatment under the direction of infectious disease.    Review of Systems   Patient unable to participate in her care.  She is sedated on ventilator  Objective    Temp:  [96.4 °F (35.8 °C)-98.5 °F (36.9 °C)] 96.9 °F (36.1 °C)  Heart Rate:  [68-97] 79  Resp:  [24-26] 26  BP: (117-160)/() 132/68  FiO2 (%):  [30 %-60 %] 30 %  Physical Exam  Sedated on ventilator  Comfortable  ?  Clonus  Sedated on propofol  Mechanically ventilated on respiratory rate of 18, tidal volume of 400, FiO2 30% and PEEP of 5  O2 saturation in the upper 90s  Pupils reactive to light  Anicteric sclera  OG tube in place with ET tube  Adequate air exchange with no crackles or wheezes  S1-S2, regular rate and rhythm telemetry showed sinus with rate of 80  Blood pressure at 118/52  Soft abdomen, nontender, no gross organomegaly  No cyanosis  Positive pitting edema of lower extremities  Bruise left upper shoulder, bandage on left arm with AV fistula  Warm dry skin      Results Review:  I have reviewed the labs, radiology results, and diagnostic studies.    Laboratory Data:   Results from last 7 days   Lab Units 07/09/21  0557 07/08/21  0830 07/03/21  0459   WBC 10*3/mm3 16.80* 10.66 4.80   HEMOGLOBIN g/dL 11.8* 13.4 10.8*   HEMATOCRIT % 36.7 44.7 36.0   PLATELETS 10*3/mm3 179 199 198        Results from last 7 days   Lab Units 07/09/21  0317 07/08/21  0946 07/07/21  0533   SODIUM mmol/L 133* 129* 132*   POTASSIUM mmol/L 4.7 6.3* 5.0   CHLORIDE mmol/L 94* 91* 95*   CO2 mmol/L 23.0 19.0* 25.0   BUN mg/dL 18 29* 21   CREATININE mg/dL 2.97* 4.31* 3.69*   CALCIUM mg/dL 9.0 9.7 9.1   BILIRUBIN mg/dL 0.6 0.5  --    ALK PHOS U/L 221* 288*  --    ALT (SGPT) U/L 8 11  --    AST (SGOT) U/L 23 30  --    GLUCOSE mg/dL 151* 145* 98       Culture Data:   No results found for: BLOODCX, URINECX, WOUNDCX, MRSACX, RESPCX, STOOLCX    Radiology Data:      Imaging Results (Last 24 Hours)     Procedure Component Value Units Date/Time    XR Abdomen KUB [[id number]] Collected: 07/08/21 1846     Updated: 07/08/21 1850    Narrative:      EXAM: XR ABDOMEN KUB- - 7/8/2021 6:36 PM CDT     HISTORY: og placement; R41.82-Altered mental status, unspecified;  A41.9-Sepsis, unspecified organism; R63-Doydwib effusion, not elsewhere  classified; R13.10-Dysphagia, unspecified; Z78.9-Other specified health  status; Z74.09-Other reduced mobility; I46.9-Cardiac arrest, cause  unspecified       COMPARISON: 7/8/2021.      TECHNIQUE:  1 images.  Supine view the upper abdomen     FINDINGS:    Similar right basilar opacity and effusion. Gastric tube tip projects at  mid gastric body. Endotracheal tube tip projects at the mid trachea, not  optimally evaluated on this exam. Probable cholecystectomy clips.          Impression:      1. Gastric tube tip projects at mid gastric body.  2. Similar right basilar opacity and effusion. Chest better evaluated on  same day chest radiograph.  This report was finalized on 07/08/2021 18:47 by Dr Carline Mcgovern MD.          I have reviewed the patient's current medications.     Assessment/Plan     Active Hospital Problems    Diagnosis    • **Altered mental status    • Fever    • Elevated troponin    • Fx humeral neck, right, with nonunion, subsequent encounter    • Pleural effusion on right    • C. difficile diarrhea    • Chronic anemia    • DM2 (diabetes mellitus, type 2) (CMS/[facility])    • HTN (hypertension)        Problem List  Status post cardiorespiratory arrest (asystole) -resuscitated for about 10 minutes before return of spontaneous circulation  Hemoptysis -possibly traumatic intubation; defer further evaluation management to pulmonary; significantly improved  End-stage renal disease on maintenance dialysis Tuesday Thursday Saturday  Possibly early fluid overload per x-ray on July 8  C. difficile associated diarrhea -p.o. vancomycin  Chronic  anemia  Diabetes mellitus type 2  History of hypertension  Right pleural effusion (chronic)  History of humeral neck fracture right with nonunion  Elevated troponin  Leukocytosis post cardiorespiratory resuscitation; monitor for any active signs of infection patient been afebrile.  No evidence of tachycardia      Spoke at length with family yesterday in the conference room.  Patient's CODE STATUS is no CPR  Vent management per pulmonary  Nephrology on maintenance dialysis  Orogastric tube in place -nutritionist consult depending on any immediate plans for weaning  Patient had sedation holiday.  No purposeful movement.  Withdraws to pain  Anticipate if no response by tomorrow we will consult neurology  Discussed with nurse Kevyn    aspirin, 81 mg, Oral, Daily  atropine, 1 drop, Left Eye, Daily  brimonidine, 1 drop, Left Eye, BID  carvedilol, 6.25 mg, Oral, BID With Meals  cholestyramine, 1 packet, Oral, Q8H  famotidine, 20 mg, Oral, Daily  heparin (porcine), 5,000 Units, Subcutaneous, Q12H  insulin regular, 2-7 Units, Subcutaneous, TID With Meals  ipratropium-albuterol, 3 mL, Nebulization, 4x Daily - RT  nicotine, 1 patch, Transdermal, Q24H  pravastatin, 10 mg, Oral, Nightly  saccharomyces boulardii, 500 mg, Oral, BID  sertraline, 25 mg, Oral, Daily  sodium chloride, 10 mL, Intravenous, Q12H  timolol, 1 drop, Left Eye, QAM  vancomycin, 125 mg, Oral, Q6H      Critical condition  Prognosis guarded  Appreciate consultants  Discharge Planning:?    Electronically signed by Sundeep Aldridge MD, 07/09/21, 09:52 CDT.

## 2021-07-09 NOTE — PROGRESS NOTES
PULMONARY AND CRITICAL CARE PROGRESS NOTE - [facility]    Patient: Mini Gentile    1960    MR# [id number]    Acct# [id number]  07/09/21   08:55 CDT  Referring Provider: Sundeep Aldridge*    Chief Complaint: Mechanically ventilated    Interval history: The patient remains intubated and sedated.  Propofol infusing.  Nursing reports that the patient withdraws to pain with sedation vacation.  Nursing notes reviewed and there was noted twitching/jerking around 5:00 this morning.  O2 sat 100% on 5 PEEP, 30% FiO2.  Decrease respiratory rate to 18.  Family is at bedside.No other aggravating or alleviating factors.     Meds:  aspirin, 81 mg, Oral, Daily  atropine, 1 drop, Left Eye, Daily  brimonidine, 1 drop, Left Eye, BID  carvedilol, 6.25 mg, Oral, BID With Meals  cholestyramine, 1 packet, Oral, Q8H  famotidine, 20 mg, Oral, Daily  heparin (porcine), 5,000 Units, Subcutaneous, Q12H  insulin regular, 2-7 Units, Subcutaneous, TID With Meals  ipratropium-albuterol, 3 mL, Nebulization, 4x Daily - RT  nicotine, 1 patch, Transdermal, Q24H  pravastatin, 10 mg, Oral, Nightly  saccharomyces boulardii, 500 mg, Oral, BID  sertraline, 25 mg, Oral, Daily  sodium chloride, 10 mL, Intravenous, Q12H  timolol, 1 drop, Left Eye, QAM  vancomycin, 125 mg, Oral, Q6H      propofol, 5-50 mcg/kg/min, Last Rate: 25 mcg/kg/min (07/09/21 0732)      Review of Systems:   Cannot obtain due to mechanical ventilated state   Ventilator Settings:        Resp Rate (Set): 26     FiO2 (%): 30 %  PEEP/CPAP (cm H2O): 5 cm H20  Minute Ventilation (L/min) (Obs): 10.7 L/min  Resp Rate (Observed) Vent: 26  I:E Ratio (Set): 1:0.76  I:E Ratio (Obs): 1:1.30  PIP Observed (cm H2O): 20 cm H2O     Physical Exam:  Temp:  [96.4 °F (35.8 °C)-98.5 °F (36.9 °C)] 96.9 °F (36.1 °C)  Heart Rate:  [68-97] 79  Resp:  [24-26] 26  BP: (117-160)/() 132/68  FiO2 (%):  [30 %-60 %] 30 %    Intake/Output Summary (Last 24 hours) at 7/9/2021  0855  Last data filed at 7/9/2021 0200  Gross per 24 hour   Intake 125 ml   Output --   Net 125 ml     SpO2 Percentage    07/09/21 0705 07/09/21 0712 07/09/21 0800   SpO2: 93% 93% 100%      Physical Exam  Vitals reviewed.   Constitutional:       Appearance: She is well-developed.      Interventions: She is sedated and intubated.   HENT:      Head: Normocephalic and atraumatic.   Eyes:      General: No scleral icterus.     Conjunctiva/sclera: Conjunctivae normal.      Pupils: Pupils are equal, round, and reactive to light.   Cardiovascular:      Rate and Rhythm: Normal rate.   Pulmonary:      Effort: She is intubated.      Breath sounds: Decreased breath sounds present.   Skin:     General: Skin is warm and dry.   Neurological:      Comments: Intubated and sedated   Psychiatric:      Comments: Sedated       Results from last 7 days   Lab Units 07/09/21  0557 07/08/21  0830 07/03/21  0459   WBC 10*3/mm3 16.80* 10.66 4.80   HEMOGLOBIN g/dL 11.8* 13.4 10.8*   PLATELETS 10*3/mm3 179 199 198     Results from last 7 days   Lab Units 07/09/21  0317 07/08/21  0946 07/07/21  0533   SODIUM mmol/L 133* 129* 132*   POTASSIUM mmol/L 4.7 6.3* 5.0   BUN mg/dL 18 29* 21   CREATININE mg/dL 2.97* 4.31* 3.69*     Results from last 7 days   Lab Units 07/09/21  0350 07/08/21  1108   PH, ARTERIAL pH units 7.495* 7.328*   PCO2, ARTERIAL mm Hg 32.8* 43.4   PO2 ART mm Hg 129.0* 172.0*   FIO2 % 50 100     No results found for: BLOODCX, URINECX, RESPCX  Recent films:  XR Chest 1 View    Result Date: 7/8/2021   1.  Very similar appearance of the chest when compared to the study from 06/30/2021. Large RIGHT pleural effusion.  2.  Findings of interstitial edema and pulmonary vascular congestion are also again seen suggestive of mild/early changes of congestive heart failure and pulmonary edema.  3.  Endotracheal tube as discussed above.   This report was finalized on 07/08/2021 08:20 by Dr. Gordo Le MD.    XR Abdomen KUB    Result Date:  7/8/2021  1. Gastric tube tip projects at mid gastric body. 2. Similar right basilar opacity and effusion. Chest better evaluated on same day chest radiograph. This report was finalized on 07/08/2021 18:47 by Dr Carline Mcgovern MD.    Films reviewed personally by me.  My interpretation: None to review today  Pulmonary Assessment:  1. SEVERE ACUTE RESPIRATORY FAILURE REQUIRING MECHANICAL VENTILATION. This is a threat to life or pulmonary function, high risk, due to acute hypoxic respiratory failure s/p cardiac arrest and asystole requiring CPR and intubation.  2. Large right-sided pleural effusion  3. Hypertension  4. diabetes mellitus  5. chronic kidney disease stage V dialysis dependent  6. Recent history of Pseudomonas bacteremia and C. difficile colitis    Recommend:   · D#2 ETT.  Continue mechanical ventilation.  · Continue daily spontaneous breathing trials  · Add daily chest x-ray/ABGs  · Continue DuoNeb  · Stress ulcer prophylaxis-Pepcid  · DVT prophylaxis-heparin  · Dialysis per nephrology  · Antibiotic per attending  · Cardiac status per cardiology  · Further orders per Dr. Valadez.    Electronically signed by ANTHONY Muhammad on 7/9/2021 at 08:55 CDT      ATTESTATION OF CLINICAL NOTE:  I have reviewed the notes, assessments, and/or procedures performed by ANTHONY Muhammad, I concur with her/his documentation of Mini Gentile.  Patient was seen in the follow-up visit in pulmonary rounds in intensive clinic today.    She did not do very well and remains ventilator dependent and no weaning is possible.  She is on propofol infusion.  She is started on tube feeding today.  She is showing improved serum sodium and hemoglobin is stable.  No other acute events overnight.  Discussed her right pleural effusion issue with Dr. Frausto and no planning for procedure this moment because of the risk of trapped lung.    On physical examination: Patient is a chronically ill  female intubated on  ventilator.  HEENT: 20 normocephalic.  Neck: Supple no mass jugular venous distention no lymphadenopathy.  Heart: Sounds normal rate and rhythm regular no murmur.  Lungs: Bibasilar crackles and decreased breath sound at bases.  Abdomen: Soft nondistended nontender..  Extremities: No edema normal pulses normal color.  Neurologic: Grossly intact.  Skin: No breakdown.    We will continue the patient on full ventilator support and try to wean her off when she is more stable.  Dialysis per nephrology.  She had dialysis yesterday and will be dialyzed likely tomorrow.  She had no other active problems and started on tube feeding.  Antibiotic coverage will be continued.  Steroid has been stopped because she is not wheezing.  Repeat chest x-ray arterial blood gas and repeat labs while on ventilator.  DVT prophylaxis with heparin stress ulcer prophylaxis Pepcid.  Neurologic status unchanged.  Patient is cardiac arrest post resuscitation may have some anoxic brain problems.  CODE STATUS: Full.  Overall process: Guarded.  Pulmonary team will continue following her and make further recommendations.    I have seen and examined patient personally, performing a face-to-face diagnostic evaluation with plan of care reviewed and developed with APRN and nursing staff. I have addended and/or modified the above history of present illness, physical examination, and assessment and plan to reflect my findings and impressions. Essential elements of the care plan were discussed with APRN above.  Agree with findings and assessment/plan as documented above.    Elvia Valaedz MD  Pulmonologist/Intensivist  7/9/2021 16:33 CDT

## 2021-07-09 NOTE — PROGRESS NOTES
Nephrology ([facility]) Progress Note      Patient:  Mini Gentile  YOB: 1960  Date of Service: 7/9/2021  MRN: [id number]   Acct: [id number]   Primary Care Physician: Bharati Dahl APRN  Advance Directive:   Code Status and Medical Interventions:   Ordered at: 07/08/21 1054     Level Of Support Discussed With:    Health Care Surrogate     Code Status:    No CPR     Medical Interventions (Level of Support Prior to Arrest):    Full     Admit Date: 6/30/2021       Hospital Day: 9  Referring Provider: No ref. provider found      Patient personally seen and examined.  Complete chart including Consults, Notes, Operative Reports, Labs, Cardiology, and Radiology studies reviewed as able.        Subjective:  Mini Gentile is a 61 y.o. female  whom we were consulted for ESRD.  On maintenance hemodialysis at [facility] on Tuesday Thursday Saturday.  She was recently hospitalized for C.Diff and pseudomonal bacteremia.  She was discharged back to SNF on 6/24/21.  She then developed a fever of 102, diarrhea, altered mental status, and right shoulder pain (has a known history of right shoulder fracture). Patient was first admitted to CCU but was then transferred to the floor. Has been tolerating HD well. On 7/08 had a PEA cardiac arrest, resuscitated and moved to CCU. She was able to tolerated dialysis on 7/08 with no issue.    Today she is intubated and sedated. No new overnight issues.     Allergies:  Bupropion, Chantix [varenicline], Gabapentin, Azithromycin, Levofloxacin, Penicillins, and Sulfa antibiotics    Home Meds:  Medications Prior to Admission   Medication Sig Dispense Refill Last Dose   • aspirin 81 MG chewable tablet Chew 81 mg Daily.   6/29/2021 at Unknown time   • atropine 1 % ophthalmic solution Administer 1 drop into the left eye Daily.   6/29/2021 at Unknown time   • brimonidine (ALPHAGAN) 0.2 % ophthalmic solution Administer 1 drop into the left  eye 2 (two) times a day.   2021 at Unknown time   • carvedilol (COREG) 6.25 MG tablet Take 6.25 mg by mouth 2 (Two) Times a Day With Meals.   2021 at Unknown time   • cholecalciferol (VITAMIN D3) 25 MCG (1000 UT) tablet Take 2,000 Units by mouth Daily.   2021 at Unknown time   • dorzolamide (TRUSOPT) 2 % ophthalmic solution Administer 1 drop into the left eye 2 (Two) Times a Day.   2021 at Unknown time   • famotidine (PEPCID) 20 MG tablet Take 1 tablet by mouth Daily.   2021 at Unknown time   • Ferric Citrate 1  MG(Fe) tablet Take 1 tablet by mouth 3 (Three) Times a Day With Meals.   2021 at Unknown time   • fluticasone (FLONASE) 50 MCG/ACT nasal spray 2 sprays into the nostril(s) as directed by provider 2 (Two) Times a Day.   2021 at Unknown time   • lactobacillus acidophilus (RISAQUAD) capsule capsule Take 1 capsule by mouth Daily. 30 capsule 2 2021 at Unknown time   • ondansetron (ZOFRAN) 4 MG tablet Take 1 tablet by mouth Every 6 (Six) Hours As Needed for Nausea or Vomiting. 24 tablet 2 2021 at Unknown time   • sertraline (ZOLOFT) 25 MG tablet Take 1 tablet by mouth Daily.   2021 at Unknown time   • sevelamer (RENAGEL) 800 MG tablet Take 800 mg by mouth 3 (Three) Times a Day With Meals.   2021 at Unknown time   • timolol (TIMOPTIC) 0.5 % ophthalmic solution Administer 1 drop into the left eye Every Morning.  12 2021 at Unknown time   • acetaminophen (TYLENOL) 325 MG tablet Take 650 mg by mouth Every 4 (Four) Hours As Needed for Mild Pain  or Fever.   Unknown at Unknown time   • ipratropium-albuterol (DUO-NEB) 0.5-2.5 mg/3 ml nebulizer Take 3 mL by nebulization Every 6 (Six) Hours As Needed for Wheezing or Shortness of Air.   Unknown at Unknown time   • [] vancomycin 50 MG/ML reconstituted solution oral solution reconstituted Take 2.5 mL by mouth Every 6 (Six) Hours for 18 doses. Indications: Clostridium Difficile Infection 45 mL 0         Medicines:  Current Facility-Administered Medications   Medication Dose Route Frequency Provider Last Rate Last Admin   • acetaminophen (TYLENOL) tablet 650 mg  650 mg Oral Q4H PRN Sarahy Mike APRN   650 mg at 07/07/21 1741    Or   • acetaminophen (TYLENOL) suppository 650 mg  650 mg Rectal Q4H PRN Sarahy Mike, APRN       • aspirin EC tablet 81 mg  81 mg Oral Daily Sarahy Mike APRN   81 mg at 07/09/21 0828   • atropine 1 % ophthalmic solution 1 drop  1 drop Left Eye Daily Sarahy Mike APRN   1 drop at 07/07/21 0845   • brimonidine (ALPHAGAN) 0.2 % ophthalmic solution 1 drop  1 drop Left Eye BID Sarahy Mike APRN   1 drop at 07/09/21 0833   • carvedilol (COREG) tablet 6.25 mg  6.25 mg Oral BID With Meals Sarahy Mike APRN   6.25 mg at 07/08/21 2137   • cholestyramine (QUESTRAN) packet 1 packet  1 packet Oral Q8H Sundeep Aldridge MD   1 packet at 07/09/21 0828   • dextrose (D50W) 25 g/ 50mL Intravenous Solution 25 g  25 g Intravenous Q15 Min PRN Sarahy Mike APRN       • dextrose (GLUTOSE) oral gel 15 g  15 g Oral Q15 Min PRN Sarahy Mike APRN       • famotidine (PEPCID) tablet 20 mg  20 mg Oral Daily Sarahy Mike APRN   20 mg at 07/09/21 0828   • glucagon (human recombinant) (GLUCAGEN DIAGNOSTIC) injection 1 mg  1 mg Subcutaneous Q15 Min PRN Sarahy Mike APRN       • heparin (porcine) 5000 UNIT/ML injection 5,000 Units  5,000 Units Subcutaneous Q12H Sarahy Mike APRN   5,000 Units at 07/08/21 2138   • insulin regular (humuLIN R,novoLIN R) injection 2-7 Units  2-7 Units Subcutaneous TID With Meals Bin Guy DO   3 Units at 07/07/21 1741   • ipratropium-albuterol (DUO-NEB) nebulizer solution 3 mL  3 mL Nebulization 4x Daily - RT Elvia Valadez MD   3 mL at 07/08/21 8994   • melatonin tablet 6 mg  6 mg Oral Nightly PRN Hang Giles MD   6 mg at 07/08/21 0022   • Morphine sulfate (PF) injection 2 mg  2 mg  Intravenous Q2H PRN Elvia Valadez MD   2 mg at 07/08/21 1635   • nicotine (NICODERM CQ) 21 MG/24HR patch 1 patch  1 patch Transdermal Q24H Sundeep Aldridge MD   1 patch at 07/08/21 2138   • ondansetron (ZOFRAN) injection 4 mg  4 mg Intravenous Q6H PRN Woeltz, Sarahy K, APRN       • pravastatin (PRAVACHOL) tablet 10 mg  10 mg Oral Nightly WoeltSamantha montgomerySarahy K, APRN   10 mg at 07/08/21 2141   • propofol (DIPRIVAN) infusion 10 mg/mL 100 mL  5-50 mcg/kg/min Intravenous Titrated Sundeep Aldridge MD 9.92 mL/hr at 07/09/21 0732 25 mcg/kg/min at 07/09/21 0732   • saccharomyces boulardii (FLORASTOR) capsule 500 mg  500 mg Oral BID Woeltz, Sarahy K, APRN   500 mg at 07/09/21 0828   • sertraline (ZOLOFT) tablet 25 mg  25 mg Oral Daily Woeltz, Sarahy K, APRN   25 mg at 07/09/21 0828   • sodium chloride 0.9 % flush 10 mL  10 mL Intravenous PRN WoeltzSamanthaSarahy K, APRN       • sodium chloride 0.9 % flush 10 mL  10 mL Intravenous Q12H Woeltz, Sarahy K, APRN   10 mL at 07/08/21 2139   • sodium chloride 0.9 % flush 10 mL  10 mL Intravenous PRN WoeltSamantha montgomerySarahy K, APRN       • timolol (TIMOPTIC) 0.5 % ophthalmic solution 1 drop  1 drop Left Eye QAM Samantha Mikericia K, APRN   1 drop at 07/08/21 0619   • vancomycin oral solution 125 mg  125 mg Oral Q6H Sundeep Aldridge MD   125 mg at 07/09/21 0541       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    • Chronic otitis media    • Diabetes (CMS/[facility])    • End stage renal disease on dialysis (CMS/[facility])    • Eustachian tube dysfunction    • GERD (gastroesophageal reflux disease)    • Glaucoma    • HTN (hypertension)    • Hyperlipidemia    • Mucoid otitis media    • Noncompliance of patient with renal dialysis (CMS/HCC)    • Tobacco abuse        Past Surgical History:  Past Surgical History:   Procedure Laterality Date   • ARTERIOVENOUS FISTULA     • CATARACT EXTRACTION WITH INTRAOCULAR  LENS IMPLANT     •  SECTION     • CHOLECYSTECTOMY     • MYRINGOTOMY W/ TUBES Bilateral    • MYRINGOTOMY W/ TUBES Right    • TUBAL ABDOMINAL LIGATION         Family History  Family History   Problem Relation Age of Onset   • Heart disease Mother    • Diabetes Father    • Colon cancer Neg Hx    • Colon polyps Neg Hx        Social History  Social History     Socioeconomic History   • Marital status: Single     Spouse name: Not on file   • Number of children: Not on file   • Years of education: Not on file   • Highest education level: Not on file   Tobacco Use   • Smoking status: Current Every Day Smoker     Packs/day: 2.00     Years: 6.00     Pack years: 12.00     Types: Cigarettes   • Smokeless tobacco: Never Used   Vaping Use   • Vaping Use: Never used   Substance and Sexual Activity   • Alcohol use: No   • Drug use: No   • Sexual activity: Defer         Review of Systems:  Unable to obtain due to intubated, sedated    Objective:  Patient Vitals for the past 24 hrs:   BP Temp Temp src Pulse Resp SpO2 Height Weight   21 0800 132/68 -- -- 79 -- 100 % -- --   21 -- -- -- 78 26 93 % -- --   21 07 -- -- -- 78 26 93 % -- --   21 0600 136/57 -- -- 86 -- 98 % -- --   21 0500 146/57 -- -- 88 -- 98 % -- 67.3 kg (148 lb 5.9 oz)   21 0400 151/58 96.9 °F (36.1 °C) Axillary 90 -- 99 % -- --   21 0315 158/63 -- -- 91 -- 100 % -- --   21 0200 124/54 -- -- 81 -- 100 % -- --   21 0100 147/58 -- -- 89 -- 100 % -- --   21 0000 152/61 -- -- 89 -- 100 % -- --   21 2319 -- 97.1 °F (36.2 °C) Axillary -- -- -- -- --   21 2300 147/59 -- -- 90 -- 100 % -- --   21 2200 153/58 -- -- 97 -- 100 % -- --   21 2100 159/64 -- -- 97 -- 100 % -- --   21 160/63 98.5 °F (36.9 °C) Axillary 95 -- 100 % -- --   21 1900 154/67 -- -- 94 26 100 % -- --   21 1858 -- -- -- 90 26 100 % -- --   21 1800 148/63 -- -- 95 -- 100 % -- --  "  07/08/21 1700 150/59 -- -- 93 -- 100 % -- --   07/08/21 1600 134/61 96.4 °F (35.8 °C) Axillary 91 -- 99 % -- --   07/08/21 1520 -- -- -- 94 26 99 % -- --   07/08/21 1510 -- -- -- 96 24 99 % -- --   07/08/21 1500 147/73 -- -- 92 -- 98 % -- --   07/08/21 1300 117/100 -- -- 94 -- 97 % -- --   07/08/21 1221 136/87 -- -- -- -- -- 147.3 cm (57.99\") 65.8 kg (145 lb)   07/08/21 1148 -- -- -- 85 -- 100 % -- --   07/08/21 1100 147/83 -- -- 78 -- 100 % -- --   07/08/21 1000 136/87 -- -- 68 -- 100 % -- --   07/08/21 0900 156/88 -- -- 75 -- 98 % -- --       Intake/Output Summary (Last 24 hours) at 7/9/2021 0840  Last data filed at 7/9/2021 0200  Gross per 24 hour   Intake 125 ml   Output --   Net 125 ml     General: intubated   Neck: supple, no JVD  Chest:  clear bilaterally  CVS: regular rate and rhythm  Abdominal: soft, nontender, positive bowel sounds  Extremities: no cyanosis or edema  Skin: warm and dry without rash      Labs:  Results from last 7 days   Lab Units 07/09/21  0557 07/08/21  0830 07/03/21  0459   WBC 10*3/mm3 16.80* 10.66 4.80   HEMOGLOBIN g/dL 11.8* 13.4 10.8*   HEMATOCRIT % 36.7 44.7 36.0   PLATELETS 10*3/mm3 179 199 198         Results from last 7 days   Lab Units 07/09/21  0317 07/08/21  0946 07/07/21  0533   SODIUM mmol/L 133* 129* 132*   POTASSIUM mmol/L 4.7 6.3* 5.0   CHLORIDE mmol/L 94* 91* 95*   CO2 mmol/L 23.0 19.0* 25.0   BUN mg/dL 18 29* 21   CREATININE mg/dL 2.97* 4.31* 3.69*   CALCIUM mg/dL 9.0 9.7 9.1   BILIRUBIN mg/dL 0.6 0.5  --    ALK PHOS U/L 221* 288*  --    ALT (SGPT) U/L 8 11  --    AST (SGOT) U/L 23 30  --    GLUCOSE mg/dL 151* 145* 98       Radiology:   Imaging Results (Last 72 Hours)     Procedure Component Value Units Date/Time    XR Abdomen KUB [[id number]] Collected: 07/08/21 1846     Updated: 07/08/21 1850    Narrative:      EXAM: XR ABDOMEN KUB- - 7/8/2021 6:36 PM CDT     HISTORY: og placement; R41.82-Altered mental status, unspecified;  A41.9-Sepsis, unspecified organism; " V95-[place] effusion, not elsewhere  classified; R13.10-Dysphagia, unspecified; Z78.9-Other specified health  status; Z74.09-Other reduced mobility; I46.9-Cardiac arrest, cause  unspecified       COMPARISON: 7/8/2021.      TECHNIQUE:  1 images.  Supine view the upper abdomen     FINDINGS:    Similar right basilar opacity and effusion. Gastric tube tip projects at  mid gastric body. Endotracheal tube tip projects at the mid trachea, not  optimally evaluated on this exam. Probable cholecystectomy clips.          Impression:      1. Gastric tube tip projects at mid gastric body.  2. Similar right basilar opacity and effusion. Chest better evaluated on  same day chest radiograph.  This report was finalized on 07/08/2021 18:47 by Dr Carline Mcgovern MD.    XR Chest 1 View [[id number]] Collected: 07/08/21 0817     Updated: 07/08/21 0829    Narrative:      EXAMINATION: XR CHEST 1 VW-  7/8/2021 8:17 AM CDT 1 view     HISTORY: code blue; R41.82-Altered mental status, unspecified;  A41.9-Sepsis, unspecified organism; E92-[place] effusion, not elsewhere  classified; R13.10-Dysphagia, unspecified; Z78.9-Other specified health  status; Z74.09-Other reduced mobility     COMPARISON: 06/30/2021.     FINDINGS:   Endotracheal tube tip is seen projecting 3.9 cm above the herman.  Vascular stent is seen in the LEFT upper chest. There is redemonstration  of what appears to be a large RIGHT pleural effusion, similar to  06/30/2021. Pulmonary vascular congestion suspected bilaterally with  some mild interstitial markings at the periphery of the LEFT lung.      Extensive chronic posttraumatic changes to the proximal RIGHT humerus,  similar to the shoulder radiograph from 06/30/2021.          Impression:         1.  Very similar appearance of the chest when compared to the study from  06/30/2021. Large RIGHT pleural effusion.     2.  Findings of interstitial edema and pulmonary vascular congestion are  also again seen suggestive of mild/early  changes of congestive heart  failure and pulmonary edema.     3.  Endotracheal tube as discussed above.        This report was finalized on 07/08/2021 08:20 by Dr. Gordo Le MD.          Culture:  Blood Culture   Date Value Ref Range Status   06/30/2021 No growth at 5 days  Final   06/30/2021 No growth at 5 days  Final         Assessment   1.  End stage renal disease on HD TTS  2.  Metabolic encephalopathy  3.  Type 2 diabetes   4.  Hypertension with periods of hypotension  5.  Anemia of CKD  6.  COPD  7.  C Diff colitis  8.  Hyponatremia  9.  S/p cardiac arrest on 7/08    Plan:  1.  Dialysis next due 7/10  2.  Monitor labs      Gomez Lemos, APRN  7/9/2021  08:40 CDT

## 2021-07-10 NOTE — CONSULTS
Neurology Consult Note    Referring Provider: Dr. Sundeep Aldridge  Reason for Consultation: HIE      History of present illness:      This is a 61-year-old female with multiple medical issues including end-stage renal disease on hemodialysis, diabetes mellitus, hypertension, and admitted for C. difficile colitis. She also had pseudomonal bacteremia.    Patient underwent a CODE BLUE on July 8.  The documented rhythm was PEA.  Resuscitation efforts lasted approximately 10 minutes before ROSC.  Since that time the patient has been intubated and encephalopathic.  A cardiac echo was unremarkable.  Troponins have been elevated.  Lab work has been nonconcerning.  Late yesterday the nursing staff began noticing some myoclonic jerking which was becoming more frequently.  A stat EEG was performed and showed evidence of myoclonus with no epileptic correlate.  Her pupils became nonreactive as well.      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    • Chronic otitis media    • Diabetes (CMS/[facility])    • End stage renal disease on dialysis (CMS/[facility])    • Eustachian tube dysfunction    • GERD (gastroesophageal reflux disease)    • Glaucoma    • HTN (hypertension)    • Hyperlipidemia    • Mucoid otitis media    • Noncompliance of patient with renal dialysis (CMS/[facility])    • Tobacco abuse        Allergies   Allergen Reactions   • Bupropion Nausea Only   • Chantix [Varenicline] Nausea And Vomiting     Does not remember   • Gabapentin Hallucinations     hallucinations   • Azithromycin Rash   • Levofloxacin Rash   • Penicillins Rash   • Sulfa Antibiotics Rash     No current facility-administered medications on file prior to encounter.     Current Outpatient Medications on File Prior to Encounter   Medication Sig   • aspirin 81 MG chewable tablet Chew 81 mg Daily.   • atropine 1 % ophthalmic solution Administer 1 drop into the left eye Daily.   • brimonidine (ALPHAGAN) 0.2 % ophthalmic  solution Administer 1 drop into the left eye 2 (two) times a day.   • carvedilol (COREG) 6.25 MG tablet Take 6.25 mg by mouth 2 (Two) Times a Day With Meals.   • cholecalciferol (VITAMIN D3) 25 MCG (1000 UT) tablet Take 2,000 Units by mouth Daily.   • dorzolamide (TRUSOPT) 2 % ophthalmic solution Administer 1 drop into the left eye 2 (Two) Times a Day.   • famotidine (PEPCID) 20 MG tablet Take 1 tablet by mouth Daily.   • Ferric Citrate 1  MG(Fe) tablet Take 1 tablet by mouth 3 (Three) Times a Day With Meals.   • fluticasone (FLONASE) 50 MCG/ACT nasal spray 2 sprays into the nostril(s) as directed by provider 2 (Two) Times a Day.   • lactobacillus acidophilus (RISAQUAD) capsule capsule Take 1 capsule by mouth Daily.   • ondansetron (ZOFRAN) 4 MG tablet Take 1 tablet by mouth Every 6 (Six) Hours As Needed for Nausea or Vomiting.   • sertraline (ZOLOFT) 25 MG tablet Take 1 tablet by mouth Daily.   • sevelamer (RENAGEL) 800 MG tablet Take 800 mg by mouth 3 (Three) Times a Day With Meals.   • timolol (TIMOPTIC) 0.5 % ophthalmic solution Administer 1 drop into the left eye Every Morning.   • acetaminophen (TYLENOL) 325 MG tablet Take 650 mg by mouth Every 4 (Four) Hours As Needed for Mild Pain  or Fever.   • ipratropium-albuterol (DUO-NEB) 0.5-2.5 mg/3 ml nebulizer Take 3 mL by nebulization Every 6 (Six) Hours As Needed for Wheezing or Shortness of Air.       Social History     Socioeconomic History   • Marital status: Single     Spouse name: Not on file   • Number of children: Not on file   • Years of education: Not on file   • Highest education level: Not on file   Tobacco Use   • Smoking status: Current Every Day Smoker     Packs/day: 2.00     Years: 6.00     Pack years: 12.00     Types: Cigarettes   • Smokeless tobacco: Never Used   Vaping Use   • Vaping Use: Never used   Substance and Sexual Activity   • Alcohol use: No   • Drug use: No   • Sexual activity: Defer     Family History   Problem Relation Age of  Onset   • Heart disease Mother    • Diabetes Father    • Colon cancer Neg Hx    • Colon polyps Neg Hx        Review of Systems  14 point review of system was attempted but patient mental status does not allow for this    Vital Signs   Temp:  [96 °F (35.6 °C)-100.3 °F (37.9 °C)] 100.3 °F (37.9 °C)  Heart Rate:  [] 107  Resp:  [18-28] 18  BP: ()/() 74/41  FiO2 (%):  [30 %] 30 %    General Exam:  Head:  Normal cephalic, atraumatic  HEENT:  Neck supple. Intubated  Fundoscopic Exam:  No signs of disc edema  CVS:  Regular rate and rhythm.  No murmurs  Carotid Examination:  No bruits  Lungs:  Clear to auscultation  Abdomen:  Non-tender, Non-distended  Extremities:  No signs of peripheral edema  Skin:  No rashes    Neurologic Exam:    Mental Status:    -No response to noxious stimulation    Cranial nerves II through XII:  Pupils nonreactive to light  Extraocular muscles show a pathologic doll's eyes  Corneal reflexes absent  Gag reflexes are atypical in that it induces myoclonus but I am unsure that she actually has a gag    Motor: (strength out of 5:  1= minimal movement, 2 = movement in plane of gravity, 3 = movement against gravity, 4 = movement against some resistance, 5 = full strength)    No spontaneous movement of the extremities. No purposeful movement.    DTR:  -Right   Bicep: 2+ Tricep: 2+ Brachoradialis: 2+   Patella: 2+ Ankle: 2+ Neg Babinski  -Left   Bicep: 2+ Tricep: 2+ Brachoradialis: 2+   Patella: 2+ Ankle: 2+ Neg Babinski    Sensory:  No withdraw from noxious simulation but stimulus-induced myoclonus is noted    Coordination:  -Nonrhythmic multifocal myoclonus noted almost constantly      Results Review:  Lab Results (last 24 hours)     Procedure Component Value Units Date/Time    Hepatitis B Surface Antigen [[id number]] Collected: 07/10/21 0951    Specimen: Blood Updated: 07/10/21 1009    POC Glucose Once [[id number]]  (Abnormal) Collected: 07/10/21 0722    Specimen: Blood Updated: 07/10/21  0742     Glucose 173 mg/dL      Comment: : [id number] Lilliana Black ID: [id number]       CBC & Differential [[id number]]  (Abnormal) Collected: 07/10/21 0341    Specimen: Blood Updated: 07/10/21 0409    Narrative:      The following orders were created for panel order CBC & Differential.  Procedure                               Abnormality         Status                     ---------                               -----------         ------                     CBC Auto Differential[[id number]]        Abnormal            Final result                 Please view results for these tests on the individual orders.    CBC Auto Differential [[id number]]  (Abnormal) Collected: 07/10/21 0341    Specimen: Blood Updated: 07/10/21 0409     WBC 16.28 10*3/mm3      RBC 3.60 10*6/mm3      Hemoglobin 10.9 g/dL      Hematocrit 33.9 %      MCV 94.2 fL      MCH 30.3 pg      MCHC 32.2 g/dL      RDW 17.9 %      RDW-SD 61.8 fl      MPV 9.4 fL      Platelets 218 10*3/mm3      Neutrophil % 93.3 %      Lymphocyte % 2.9 %      Monocyte % 2.6 %      Eosinophil % 0.2 %      Basophil % 0.3 %      Immature Grans % 0.7 %      Neutrophils, Absolute 15.17 10*3/mm3      Lymphocytes, Absolute 0.48 10*3/mm3      Monocytes, Absolute 0.42 10*3/mm3      Eosinophils, Absolute 0.04 10*3/mm3      Basophils, Absolute 0.05 10*3/mm3      Immature Grans, Absolute 0.12 10*3/mm3      nRBC 0.1 /100 WBC     Blood Gas, Arterial - [[id number]]  (Abnormal) Collected: 07/10/21 0330    Specimen: Arterial Blood Updated: 07/10/21 0335     Site Right Radial     Rustam's Test Positive     pH, Arterial 7.457 pH units      Comment: 83 Value above reference range        pCO2, Arterial 36.1 mm Hg      pO2, Arterial 82.3 mm Hg      Comment: 84 Value below reference range        HCO3, Arterial 25.5 mmol/L      Base Excess, Arterial 1.7 mmol/L      O2 Saturation, Arterial 97.4 %      Temperature 37.0 C      Barometric Pressure for Blood Gas 749 mmHg      Modality Ventilator      FIO2 30 %      Ventilator Mode AC     Set Tidal Volume 400     Set [facility] Resp Rate 18.0     PEEP 5.0     Collected by [id number]     Comment: Meter: [id number]     :  [email or web address]        pCO2, Temperature Corrected 36.1 mm Hg      pH, Temp Corrected 7.457 pH Units      pO2, Temperature Corrected 82.3 mm Hg     POC Glucose Once [[id number]]  (Abnormal) Collected: 07/09/21 2110    Specimen: Blood Updated: 07/09/21 2121     Glucose 167 mg/dL      Comment: : [id number] Carlos GenevaMeter ID: [id number]       POC Glucose Once [[id number]]  (Abnormal) Collected: 07/09/21 1739    Specimen: Blood Updated: 07/09/21 1752     Glucose 225 mg/dL      Comment: : [id number] Anabela Calle  JMeter ID: [id number]             .  Imaging Results (Last 24 Hours)     Procedure Component Value Units Date/Time    XR Chest 1 View [[id number]] Collected: 07/10/21 0746     Updated: 07/10/21 0751    Narrative:      EXAMINATION: XR CHEST 1 VW-     7/10/2021 3:05 AM CDT     HISTORY: vent; R41.82-Altered mental status, unspecified; A41.9-Sepsis,  unspecified organism; P70-[place] effusion, not elsewhere classified;  R13.10-Dysphagia, unspecified; Z78.9-Other specified health status;  Z74.09-Other reduced mobility; I46.9-Cardiac arrest, cause unspecified     1 view chest x-ray.  Comparison is made with a one view chest x-ray from July 9, 2021.     Heart size is normal and unchanged.     The endotracheal tube remains in place with the tip 31 mm above the  herman.  The nasogastric tube remains in good position.     Interstitial lung disease with interval partial clearing of bilateral  lower lobe infiltrate or atelectasis.     Persistent right basilar consolidation and right pleural effusion.     Summary:  1. Partial clearing of bibasilar infiltrate.  2. Otherwise stable chest.        This report was finalized on 07/10/2021 07:48 by Dr. Dharmesh Zuleta MD.        Reviewed labs.      EEG shows no epileptic correlate and instead myogenic  activity and evidence of multifocal myoclonus  Impression    • Severe hypoxic ischemic encephalopathy  o Status post cardiac arrest with PEA rhythm with 10 minutes of resuscitation efforts  • Gomez Panda myoclonus  • Diabetes mellitus  • End-stage renal disease on hemodialysis  • History of hypertension    Plan    • Discussed at length with healthcare power of . I explained that I think her chance of a functional neurologic outcome given the lack of all brainstem reflexes and EEG showing no signs of treatable subclinical status epilepticus. The fact that she has Lui myoclonus also is a poor prognostic factor. I offered high-dose antiepileptics and increasing propofol. Her healthcare power of  told me that she would not be interested in this and if she was in any discomfort whatsoever would rather transition to comfort care. I will relay this message to the primary team. I do think it is reasonable to go ahead and load Depacon as this may symptomatically relieve some of her multifocal myoclonus.  • Keppra/Depacon loaded for symptomatic relief of myoclonus      40 minutes critical care time with the neurologic exam, stat EEG reviewed, and discussion of prognosis with significant other.    I discussed the patients findings and my recommendations with patient and family    Gordo Martin MD  07/10/21  10:27 CDT

## 2021-07-10 NOTE — PLAN OF CARE
Patient has continued to have myoclonic type movement through out the night. Worsens with any type of stimuli. Is not withdrawing to pain, rather this increases jerking. It has been very difficult to obtain accurate blood pressures r/t movement  Pupils have been unequal and non reactive.  Have been getting less blood/clots from ET, Lungs still sound quite course.   Tolerating tube feeds well, at goal rate currently  One small BM this shift

## 2021-07-10 NOTE — PROGRESS NOTES
PULMONARY AND CRITICAL CARE PROGRESS NOTE - [facility]    Patient: Mini Gentile    1960    MR# [id number]    Acct# [id number]  07/10/21   12:55 CDT  Referring Provider: Sundeep Aldridge*    Chief Complaint: Mechanically ventilated    Interval history: The patient remains intubated and sedated.  She did not improve since yesterday.  She is on sedation and propofol infusing.  She is getting hemodialysis this morning.  She was seen by Dr. Martin the neurologist this morning.  He talked to the family about possible palliative care or comfort measures and family may decide soon.  Nursing reports that the patient withdraws to pain with sedation vacation.  She did not have any eye-opening or spontaneous response but she was noted to have twitching/jerking movement suggesting possible severe anoxic brain injury.  She remains stable on the ventilator with oxygen sat 100% on 5 PEEP, 30% FiO2.  Decrease respiratory rate to 18.  There was no family is at bedside.No other aggravating or alleviating factors.     Meds:  aspirin, 81 mg, Oral, Daily  atropine, 1 drop, Left Eye, Daily  Beneprotein, 1 packet, Nasogastric, TID  brimonidine, 1 drop, Left Eye, BID  carvedilol, 6.25 mg, Oral, BID With Meals  cholestyramine, 1 packet, Oral, Q8H  famotidine, 20 mg, Oral, Daily  heparin (porcine), 5,000 Units, Subcutaneous, Q12H  ipratropium-albuterol, 3 mL, Nebulization, 4x Daily - RT  levETIRAcetam, 1,000 mg, Intravenous, Q12H  nicotine, 1 patch, Transdermal, Q24H  pravastatin, 10 mg, Oral, Nightly  saccharomyces boulardii, 500 mg, Oral, BID  sertraline, 25 mg, Oral, Daily  sodium chloride, 10 mL, Intravenous, Q12H  timolol, 1 drop, Left Eye, QAM  valproate sodium, 1,000 mg, Intravenous, Q8H  vancomycin, 125 mg, Oral, Q6H      propofol, 5-50 mcg/kg/min, Last Rate: 20 mcg/kg/min (07/10/21 0618)      Review of Systems:   Cannot obtain due to mechanical ventilated state   Ventilator Settings:        Resp  Rate (Set): 18     FiO2 (%): 30 %  PEEP/CPAP (cm H2O): 5 cm H20  Minute Ventilation (L/min) (Obs): 9.69 L/min  Resp Rate (Observed) Vent: 20  I:E Ratio (Set): 1:0.43  I:E Ratio (Obs): 1:1.50  PIP Observed (cm H2O): 20 cm H2O     Physical Exam:  Temp:  [96 °F (35.6 °C)-100.3 °F (37.9 °C)] 100.2 °F (37.9 °C)  Heart Rate:  [] 94  Resp:  [18-28] 23  BP: ()/() 121/43  FiO2 (%):  [30 %] 30 %    Intake/Output Summary (Last 24 hours) at 7/10/2021 1255  Last data filed at 7/10/2021 0400  Gross per 24 hour   Intake 631 ml   Output --   Net 631 ml     SpO2 Percentage    07/10/21 1014 07/10/21 1130 07/10/21 1215   SpO2: 100% 99% 100%      Physical Exam  Vitals reviewed.   Constitutional:       Appearance: She is well-developed. She is ill-appearing.      Interventions: She is sedated and intubated.   HENT:      Head: Normocephalic and atraumatic.   Eyes:      General: No scleral icterus.     Conjunctiva/sclera: Conjunctivae normal.      Pupils: Pupils are equal, round, and reactive to light.   Cardiovascular:      Rate and Rhythm: Normal rate.   Pulmonary:      Effort: She is intubated.      Breath sounds: Decreased breath sounds present.   Skin:     General: Skin is warm and dry.   Neurological:      Comments: Intubated and sedated   Psychiatric:      Comments: Sedated       Results from last 7 days   Lab Units 07/10/21  0341 07/09/21  0557 07/08/21  0830   WBC 10*3/mm3 16.28* 16.80* 10.66   HEMOGLOBIN g/dL 10.9* 11.8* 13.4   PLATELETS 10*3/mm3 218 179 199     Results from last 7 days   Lab Units 07/09/21  0317 07/08/21  0946 07/07/21  0533   SODIUM mmol/L 133* 129* 132*   POTASSIUM mmol/L 4.7 6.3* 5.0   BUN mg/dL 18 29* 21   CREATININE mg/dL 2.97* 4.31* 3.69*     Results from last 7 days   Lab Units 07/10/21  0330 07/09/21  0350 07/08/21  1108   PH, ARTERIAL pH units 7.457* 7.495* 7.328*   PCO2, ARTERIAL mm Hg 36.1 32.8* 43.4   PO2 ART mm Hg 82.3* 129.0* 172.0*   FIO2 % 30 50 100     No results found for:  BLOODCX, URINECX, RESPCX  Recent films:  XR Chest 1 View    Result Date: 7/9/2021   1.  Slight worsening of consolidative change in the LEFT lung base.  2.  RIGHT pleural effusion redemonstrated.  3.  Findings concerning for pulmonary edema and congestive heart failure. Underlying pneumonia would be difficult to exclude.   This report was finalized on 07/09/2021 11:18 by Dr. Gordo Le MD.    XR Abdomen KUB    Result Date: 7/8/2021  1. Gastric tube tip projects at mid gastric body. 2. Similar right basilar opacity and effusion. Chest better evaluated on same day chest radiograph. This report was finalized on 07/08/2021 18:47 by Dr Carline Mcgovern MD.    Films reviewed personally by me.  My interpretation: None to review today  Pulmonary Assessment:  1. SEVERE ACUTE RESPIRATORY FAILURE REQUIRING MECHANICAL VENTILATION. This is a threat to life or pulmonary function, high risk, due to acute hypoxic respiratory failure s/p cardiac arrest and asystole requiring CPR and intubation.  2. Large right-sided pleural effusion  3. Hypertension  4. diabetes mellitus  5. chronic kidney disease stage V dialysis dependent  6. Recent history of Pseudomonas bacteremia and C. difficile colitis  7. Severe encephalopathy likely anoxic encephalopathy.    Recommend:   · D#3 ETT.  Continue mechanical ventilation.  · Continue daily spontaneous breathing trials  · She is not very responsive and may not be well to wean off from the ventilator after all.  · She continues to have myoclonic jerks and involuntary movement suggesting possible neurologic dysfunction from encephalopathy.  EEG has been done and neurology is following.  Patient may have poor prognosis and family may consider comfort measures.  · Repeat daily chest x-ray/ABGs while on ventilator.  · Continue DuoNeb  · Stress ulcer prophylaxis-Pepcid  · DVT prophylaxis-heparin  · Dialysis per nephrology.  She is getting dialyzed today.  · Antibiotic per attending  · Cardiac status  per cardiology.  Echocardiogram has been done and troponin was elevated.   · Continue current treatment plan and supportive care.  · CODE STATUS: Full but family may consider palliative care and comfort measures as per Dr. Martin.  · Overall prognosis is guarded to poor.  · Pulmonary team will continue following her and make further recommendations.    Electronically signed by     Elvia Valadez MD  Pulmonologist/Intensivist  7/10/2021 12:55 CDT

## 2021-07-10 NOTE — SIGNIFICANT NOTE
[facility] contacted due to patient's family electing to transition to comfort care. Dorothy with [facility] updated, stated [facility] will not pursue organ donation. Dorothy request being notified at cardiac death for tissue donation at that point.

## 2021-07-10 NOTE — PROGRESS NOTES
[facility] Medicine Services  INPATIENT PROGRESS NOTE    Patient Name: Mini Gentile  Date of Admission: 6/30/2021  Today's Date: 07/10/21  Length of Stay: 10  Primary Care Physician: Bharati Dahl APRN    Subjective   Chief Complaint: Follow-up  HPI   She had input from consultants  Patient has more myoclonic movement  EEG done and discussed with Dr. Martin.  He said that there is no correlation.  Patient likely suffered hypoxic ischemic encephalopathy status post code on July 8  After discussion by Dr. Martin with family, the are looking towards transitioning her to comfort measures.    I spoke to longtime partner/ABRAHAN Lackey who confirmed the decision.  They are waiting to get her family today.  They are agreeable not to escalate further treatment/management because they want to transition her to comfort measures and palliatively extubate soon.    Due to above, dialysis was stopped sooner.  Review of Systems   Patient unable to participate in her care.    Objective    Temp:  [96 °F (35.6 °C)-100.3 °F (37.9 °C)] 100.3 °F (37.9 °C)  Heart Rate:  [] 107  Resp:  [18-28] 18  BP: ()/() 74/41  FiO2 (%):  [30 %] 30 %  Physical Exam    Sedated on ventilator  Comfortable  More pronouned myoclonus compared yesterdayus  Sedated on propofol  Mechanically ventilated on respiratory rate of 18, tidal volume of 400, FiO2 30% and PEEP of 5  O2 saturation in the upper 90s  Pupils reactive to light  Anicteric sclera  OG tube in place with ET tube  Adequate air exchange with no crackles or wheezes  S1-S2, regular rate and rhythm telemetry showed sinus with rate of 80  Blood pressure at 118/52  Soft abdomen, nontender, no gross organomegaly  No cyanosis  Positive pitting edema of lower extremities  Bruise left upper shoulder, bandage on left arm with AV fistula  Warm dry skin    Results Review:  I have reviewed the labs, radiology results, and diagnostic  studies.    Laboratory Data:   Results from last 7 days   Lab Units 07/10/21  0341 07/09/21  0557 07/08/21  0830   WBC 10*3/mm3 16.28* 16.80* 10.66   HEMOGLOBIN g/dL 10.9* 11.8* 13.4   HEMATOCRIT % 33.9* 36.7 44.7   PLATELETS 10*3/mm3 218 179 199        Results from last 7 days   Lab Units 07/09/21  0317 07/08/21  0946 07/07/21  0533   SODIUM mmol/L 133* 129* 132*   POTASSIUM mmol/L 4.7 6.3* 5.0   CHLORIDE mmol/L 94* 91* 95*   CO2 mmol/L 23.0 19.0* 25.0   BUN mg/dL 18 29* 21   CREATININE mg/dL 2.97* 4.31* 3.69*   CALCIUM mg/dL 9.0 9.7 9.1   BILIRUBIN mg/dL 0.6 0.5  --    ALK PHOS U/L 221* 288*  --    ALT (SGPT) U/L 8 11  --    AST (SGOT) U/L 23 30  --    GLUCOSE mg/dL 151* 145* 98       Culture Data:   No results found for: BLOODCX, URINECX, WOUNDCX, MRSACX, RESPCX, STOOLCX    Radiology Data:   Imaging Results (Last 24 Hours)     Procedure Component Value Units Date/Time    XR Chest 1 View [[id number]] Collected: 07/10/21 0746     Updated: 07/10/21 0751    Narrative:      EXAMINATION: XR CHEST 1 VW-     7/10/2021 3:05 AM CDT     HISTORY: vent; R41.82-Altered mental status, unspecified; A41.9-Sepsis,  unspecified organism; T76-Dmktmud effusion, not elsewhere classified;  R13.10-Dysphagia, unspecified; Z78.9-Other specified health status;  Z74.09-Other reduced mobility; I46.9-Cardiac arrest, cause unspecified     1 view chest x-ray.  Comparison is made with a one view chest x-ray from July 9, 2021.     Heart size is normal and unchanged.     The endotracheal tube remains in place with the tip 31 mm above the  herman.  The nasogastric tube remains in good position.     Interstitial lung disease with interval partial clearing of bilateral  lower lobe infiltrate or atelectasis.     Persistent right basilar consolidation and right pleural effusion.     Summary:  1. Partial clearing of bibasilar infiltrate.  2. Otherwise stable chest.        This report was finalized on 07/10/2021 07:48 by Dr. Dharmesh Zuleta MD.          I  have reviewed the patient's current medications.     Assessment/Plan     Active Hospital Problems    Diagnosis    • **Altered mental status    • Fever    • Elevated troponin    • Fx humeral neck, right, with nonunion, subsequent encounter    • Pleural effusion on right    • C. difficile diarrhea    • Chronic anemia    • DM2 (diabetes mellitus, type 2) (CMS/[facility])    • HTN (hypertension)          Problem List  Status post cardiorespiratory arrest (asystole) -resuscitated for about 10 minutes before return of spontaneous circulation  Hemoptysis -possibly traumatic intubation; defer further evaluation management to pulmonary; significantly improved  End-stage renal disease on maintenance dialysis Tuesday Thursday Saturday  Possibly early fluid overload per x-ray on July 8  C. difficile associated diarrhea -p.o. vancomycin  Chronic anemia  Diabetes mellitus type 2  History of hypertension  Right pleural effusion (chronic)  History of humeral neck fracture right with nonunion  Elevated troponin  Leukocytosis post cardiorespiratory resuscitation; monitor for any active signs of infection patient been afebrile.  No evidence of tachycardia    Transitioning to comfort care; anticipating palliative extubation  Dialysis has been stopped.   Family requested no further escalation of care  Discussed with nurse Lane.  Appreciate input from consultants      aspirin, 81 mg, Oral, Daily  atropine, 1 drop, Left Eye, Daily  Beneprotein, 1 packet, Nasogastric, TID  brimonidine, 1 drop, Left Eye, BID  carvedilol, 6.25 mg, Oral, BID With Meals  cholestyramine, 1 packet, Oral, Q8H  famotidine, 20 mg, Oral, Daily  heparin (porcine), 5,000 Units, Subcutaneous, Q12H  insulin regular, 2-7 Units, Subcutaneous, TID With Meals  ipratropium-albuterol, 3 mL, Nebulization, 4x Daily - RT  levETIRAcetam, 1,000 mg, Intravenous, Q12H  nicotine, 1 patch, Transdermal, Q24H  pravastatin, 10 mg, Oral, Nightly  saccharomyces boulardii, 500 mg, Oral,  BID  sertraline, 25 mg, Oral, Daily  sodium chloride, 10 mL, Intravenous, Q12H  timolol, 1 drop, Left Eye, QAM  valproate sodium, 1,000 mg, Intravenous, Q8H  vancomycin, 125 mg, Oral, Q6H      Discharge Planning:     Electronically signed by Sundeep Aldridge MD, 07/10/21, 12:08 CDT.

## 2021-07-11 NOTE — PAYOR COMM NOTE
" 21  [id number]    Mini Peña (Dcsd. Female)     Date of Birth Social Security Number Address Home Phone MRN    1960  [address] [place] [id number] [phone] [id number]    [employer]oration Marital Status          Other Single       Admission Date Admission Type Admitting Provider Attending Provider Department, Room/Bed    21 Emergency Sundeep Aldridge MD  [facility] 3C, 388/1    Discharge Date Discharge Disposition Discharge Destination        2021               Attending Provider: (none)   Allergies: Bupropion, Chantix [Varenicline], Gabapentin, Azithromycin, Levofloxacin, Penicillins, Sulfa Antibiotics    Isolation: Spore   Infection: C.difficile (21)   Code Status: Prior    Ht: 147.3 cm (57.99\")   Wt: 67.6 kg (149 lb 1.6 oz)    Admission Cmt: None   Principal Problem: Altered mental status [R41.82]                 Active Insurance as of 2021     Primary Coverage     Payor Plan Insurance Group Employer/Plan Group    [facility] MEDICARE REPLACEMENT [employer]LL[employer] MEDICARE REPLACEMENT      Payor Plan Address Payor Plan Phone Number Payor Plan Fax Number Effective Dates    [address] [address] [phone]  2021 - None Entered    [facility] [id number]       Subscriber Name Subscriber Birth Date Member ID       MINI PEÑA 1960 [id number]           Secondary Coverage     Payor Plan Insurance Group Employer/Plan Group    [facility] WELLCARE MEDICAID      Payor Plan Address Payor Plan Phone Number Payor Plan Fax Number Effective Dates    [address] [address] [phone]  2016 - 2021    [facility] [id number]       Subscriber Name Subscriber Birth Date Member ID       MINI PEÑA 1960 [id number]                 Emergency Contacts      (Rel.) Home Phone Work Phone Mobile Phone    Erica Bowling (Other) [phone] -- [phone]    Jose Bowling (Significant Other) [phone] -- [phone]    Mj, " Arlette (Daughter) -- -- [phone]               Discharge Summary      Sundeep Aldridge MD at 07/11/21 2732        She passed on on July 10 at 2005-hour.  She had cardiorespiratory arrest on July 8 (asystole).  She had a return of spontaneous circulation after 10 minutes of resuscitation.  She likely suffered from hypoxic ischemic encephalopathy.  She has multiple medical comorbidities including end-stage renal disease on hemodialysis, C. difficile associated diarrhea and a recent Pseudomonas bacteremia.  She carries history of diabetes mellitus, hypertension.  She has chronic right pleural effusion.  Family decided to pursue comfort measure.  Patient was palliatively extubated yesterday.  She passed on peacefully.    Discharge diagnosis includes     status post cardiorespiratory arrest (asystole)  End-stage renal disease on hemodialysis  C. difficile associated diarrhea  Chronic anemia likely associated from chronic kidney disease  Diabetes mellitus type 2  Right pleural effusion chronic  History of hypertension  Hemoptysis possibly from traumatic intubation      Electronically signed by Sundeep Aldridge MD at 07/11/21 2564

## 2021-07-11 NOTE — THERAPY DISCHARGE NOTE
Acute Care - Physical Therapy Discharge Summary  [facility]       Patient Name: Mini Gentile  : 1960  MRN: [id number]    Today's Date: 2021                 Admit Date: 2021      PT Recommendation and Plan    Visit Dx:    ICD-10-CM ICD-9-CM   1. Altered mental status, unspecified altered mental status type  R41.82 780.97   2. Sepsis, due to unspecified organism, unspecified whether acute organ dysfunction present (CMS/[facility])  A41.9 038.9     995.91   3. Pleural effusion  J90 511.9   4. Dysphagia, unspecified type  R13.10 787.20   5. Decreased activities of daily living (ADL)  Z78.9 V49.89   6. Impaired mobility  Z74.09 799.89   7. Cardiorespiratory arrest (CMS/[facility])  I46.9 427.5               PT Rehab Goals     Row Name 21 1525             Bed Mobility Goal 1 (PT)    Activity/Assistive Device (Bed Mobility Goal 1, PT)  bed mobility activities, all  -NW      [place] Level/Cues Needed (Bed Mobility Goal 1, PT)  independent  -NW      Time Frame (Bed Mobility Goal 1, PT)  long term goal (LTG);by discharge  -NW      Progress/Outcomes (Bed Mobility Goal 1, PT)  goal not met  -NW         Transfer Goal 1 (PT)    Activity/Assistive Device (Transfer Goal 1, PT)  sit-to-stand/stand-to-sit;bed-to-chair/chair-to-bed;walker, rolling  -NW      [place] Level/Cues Needed (Transfer Goal 1, PT)  modified independence  -NW      Time Frame (Transfer Goal 1, PT)  long term goal (LTG);by discharge  -NW      Progress/Outcome (Transfer Goal 1, PT)  goal not met  -NW         Gait Training Goal 1 (PT)    Activity/Assistive Device (Gait Training Goal 1, PT)  gait (walking locomotion);assistive device use;decrease fall risk;increase endurance/gait distance;walker, rolling  -NW      [place] Level (Gait Training Goal 1, PT)  modified independence;tactile cues required;verbal cues required  -NW      Distance (Gait Training Goal 1, PT)  75ft  -NW      Time Frame (Gait Training Goal 1, PT)  long term goal  (LTG);by discharge  -NW      Progress/Outcome (Gait Training Goal 1, PT)  goal not met  -NW        User Key  (r) = Recorded By, (t) = Taken By, (c) = Cosigned By    Initials Name Provider Type Discipline    Katie Damon, PTA Physical Therapy Assistant PT              PT Discharge Summary  Anticipated Discharge Disposition (PT): other (see comments)  Reason for Discharge: Patient   Outcomes Achieved: Other  Discharge Destination: other (comment)      Katie June PTA   2021

## 2021-07-11 NOTE — PROGRESS NOTES
Called by nursing Enriqueta.  Patient passed at 20:05 on 7/10/2021.  Was comfort measure.        Electronically signed by Tejinder Kent MD, 07/10/21, 20:25 CDT.

## 2021-07-11 NOTE — PROGRESS NOTES
Nephrology ([facility]) Progress Note      Patient:  Mini Gentile  YOB: 1960  Date of Service: 7/10/2021  MRN: [id number]   Acct: [id number]   Primary Care Physician: Bharati Dahl APRN  Advance Directive:   Code Status and Medical Interventions:   Ordered at: 07/10/21 1425     Level Of Support Discussed With:    Health Care Surrogate     Code Status:    No CPR     Medical Interventions (Level of Support Prior to Arrest):    Comfort Measures     Admit Date: 6/30/2021       Hospital Day: 10  Referring Provider: No ref. provider found      Patient personally seen and examined.  Complete chart including Consults, Notes, Operative Reports, Labs, Cardiology, and Radiology studies reviewed as able.        Subjective:  Mini Gentile is a 61 y.o. female  whom we were consulted for ESRD and maintenance HD. Patient dialyzes in  clinic on TTS.  She was recently hospitalized for C.Diff and pseudomonal bacteremia.  She was discharged back to SNF on 6/24/21.  She then developed a fever of 102, diarrhea, ams, and right shoulder pain (has a known history of right shoulder fracture). Patient was first admitted to CCU but was then transferred to the floor .  Patient's main complaint was diarrhea.     Today, patient was undergoing dialysis but later family adjusted the goals of care for dialysis approximate 30 minutes prior to completion of therapy.  She was later determined extubated with plans to transfer to comfort care.  She was nonresponsive during my examination.    Allergies:  Bupropion, Chantix [varenicline], Gabapentin, Azithromycin, Levofloxacin, Penicillins, and Sulfa antibiotics    Home Meds:  Medications Prior to Admission   Medication Sig Dispense Refill Last Dose   • aspirin 81 MG chewable tablet Chew 81 mg Daily.   6/29/2021 at Unknown time   • atropine 1 % ophthalmic solution Administer 1 drop into the left eye Daily.   6/29/2021 at Unknown time   • brimonidine  (ALPHAGAN) 0.2 % ophthalmic solution Administer 1 drop into the left eye 2 (two) times a day.   2021 at Unknown time   • carvedilol (COREG) 6.25 MG tablet Take 6.25 mg by mouth 2 (Two) Times a Day With Meals.   2021 at Unknown time   • cholecalciferol (VITAMIN D3) 25 MCG (1000 UT) tablet Take 2,000 Units by mouth Daily.   2021 at Unknown time   • dorzolamide (TRUSOPT) 2 % ophthalmic solution Administer 1 drop into the left eye 2 (Two) Times a Day.   2021 at Unknown time   • famotidine (PEPCID) 20 MG tablet Take 1 tablet by mouth Daily.   2021 at Unknown time   • Ferric Citrate 1  MG(Fe) tablet Take 1 tablet by mouth 3 (Three) Times a Day With Meals.   2021 at Unknown time   • fluticasone (FLONASE) 50 MCG/ACT nasal spray 2 sprays into the nostril(s) as directed by provider 2 (Two) Times a Day.   2021 at Unknown time   • lactobacillus acidophilus (RISAQUAD) capsule capsule Take 1 capsule by mouth Daily. 30 capsule 2 2021 at Unknown time   • ondansetron (ZOFRAN) 4 MG tablet Take 1 tablet by mouth Every 6 (Six) Hours As Needed for Nausea or Vomiting. 24 tablet 2 2021 at Unknown time   • sertraline (ZOLOFT) 25 MG tablet Take 1 tablet by mouth Daily.   2021 at Unknown time   • sevelamer (RENAGEL) 800 MG tablet Take 800 mg by mouth 3 (Three) Times a Day With Meals.   2021 at Unknown time   • timolol (TIMOPTIC) 0.5 % ophthalmic solution Administer 1 drop into the left eye Every Morning.  12 2021 at Unknown time   • acetaminophen (TYLENOL) 325 MG tablet Take 650 mg by mouth Every 4 (Four) Hours As Needed for Mild Pain  or Fever.   Unknown at Unknown time   • ipratropium-albuterol (DUO-NEB) 0.5-2.5 mg/3 ml nebulizer Take 3 mL by nebulization Every 6 (Six) Hours As Needed for Wheezing or Shortness of Air.   Unknown at Unknown time   • [] vancomycin 50 MG/ML reconstituted solution oral solution reconstituted Take 2.5 mL by mouth Every 6 (Six) Hours for 18  doses. Indications: Clostridium Difficile Infection 45 mL 0        Medicines:  Current Facility-Administered Medications   Medication Dose Route Frequency Provider Last Rate Last Admin   • acetaminophen (TYLENOL) tablet 650 mg  650 mg Oral Q4H PRN Sarahy Mike, APRN   650 mg at 21 1741    Or   • acetaminophen (TYLENOL) suppository 650 mg  650 mg Rectal Q4H PRN Sarahy Mike, APRN       • LORazepam (ATIVAN) injection 1 mg  1 mg Intravenous Q4H PRN Sundeep Aldridge MD   1 mg at 07/10/21 1421   • melatonin tablet 6 mg  6 mg Oral Nightly PRN Hang Giles MD   6 mg at 21 0022   • Morphine 1mg/ml infusion 30ml  1 mg/hr Intravenous Continuous Sundeep Aldridge MD 1 mL/hr at 07/10/21 1526 1 mg/hr at 07/10/21 1526   • Morphine sulfate (PF) injection 2 mg  2 mg Intravenous Q2H PRN Elvia Valadez MD   2 mg at 07/10/21 1459   • ondansetron (ZOFRAN) injection 4 mg  4 mg Intravenous Q6H PRN Sarahy Mike APRAC       • Scopolamine (TRANSDERM-SCOP) 1.5 MG/3DAYS patch 1 patch  1 patch Transdermal Q72H Sundeep Aldridge MD   1 patch at 07/10/21 1750       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    • Chronic otitis media    • Diabetes (CMS/[facility])    • End stage renal disease on dialysis (CMS/[facility])    • Eustachian tube dysfunction    • GERD (gastroesophageal reflux disease)    • Glaucoma    • HTN (hypertension)    • Hyperlipidemia    • Mucoid otitis media    • Noncompliance of patient with renal dialysis (CMS/[facility])    • Tobacco abuse        Past Surgical History:  Past Surgical History:   Procedure Laterality Date   • ARTERIOVENOUS FISTULA     • CATARACT EXTRACTION WITH INTRAOCULAR LENS IMPLANT     •  SECTION     • CHOLECYSTECTOMY     • MYRINGOTOMY W/ TUBES Bilateral    • MYRINGOTOMY W/ TUBES Right    • TUBAL ABDOMINAL LIGATION         Family History  Family History   Problem Relation Age of  Onset   • Heart disease Mother    • Diabetes Father    • Colon cancer Neg Hx    • Colon polyps Neg Hx        Social History  Social History     Socioeconomic History   • Marital status: Single     Spouse name: Not on file   • Number of children: Not on file   • Years of education: Not on file   • Highest education level: Not on file   Tobacco Use   • Smoking status: Current Every Day Smoker     Packs/day: 2.00     Years: 6.00     Pack years: 12.00     Types: Cigarettes   • Smokeless tobacco: Never Used   Vaping Use   • Vaping Use: Never used   Substance and Sexual Activity   • Alcohol use: No   • Drug use: No   • Sexual activity: Defer       Review of Systems:  History obtained from unobtainable from patient due to mental status      Objective:  Patient Vitals for the past 24 hrs:   BP Temp Temp src Pulse Resp SpO2 Weight   07/10/21 1852 (!) 57/29 97.8 °F (36.6 °C) Axillary 75 28 (!) 84 % --   07/10/21 1800 (!) 54/32 -- -- 74 -- 90 % --   07/10/21 1730 (!) 51/30 -- -- 75 -- 94 % --   07/10/21 1700 (!) 52/32 -- -- 75 -- 95 % --   07/10/21 1630 (!) 53/33 -- -- 78 -- 92 % --   07/10/21 1600 (!) 55/31 -- -- 77 -- (!) 87 % --   07/10/21 1530 (!) 63/31 -- -- 78 -- (!) 81 % --   07/10/21 1500 (!) 66/29 -- -- 72 -- (!) 74 % --   07/10/21 1430 116/44 -- -- 94 -- (!) 82 % --   07/10/21 1400 131/50 -- -- 95 22 100 % --   07/10/21 1330 125/50 -- -- 95 19 100 % --   07/10/21 1315 126/78 -- -- 95 -- 100 % --   07/10/21 1300 123/62 -- -- 94 -- 100 % --   07/10/21 1230 128/45 -- -- 94 -- 100 % --   07/10/21 1215 -- -- -- 94 -- 100 % --   07/10/21 1130 121/43 -- -- 96 23 99 % --   07/10/21 1023 -- -- -- 107 -- -- --   07/10/21 1014 -- -- -- 100 18 100 % --   07/10/21 0930 164/52 -- -- 102 -- 100 % --   07/10/21 0900 -- 100.2 °F (37.9 °C) Axillary 104 20 100 % --   07/10/21 0800 (!) 74/41 -- -- 90 -- 100 % --   07/10/21 0637 -- -- -- 96 -- -- --   07/10/21 0630 120/71 -- -- 97 -- 100 % --   07/10/21 0629 -- -- -- 97 18 100 % --    07/10/21 0600 105/83 -- -- 93 28 100 % --   07/10/21 0530 118/40 -- -- 88 24 100 % --   07/10/21 0405 -- 100.3 °F (37.9 °C) Axillary -- -- -- --   07/10/21 0400 -- -- -- -- -- -- 67.6 kg (149 lb 1.6 oz)   07/10/21 0310 147/69 -- -- 91 -- 100 % --   07/10/21 0230 105/45 -- -- 83 20 100 % --   07/10/21 0135 107/72 -- -- 89 -- 100 % --   07/10/21 0047 176/98 -- -- 87 -- 100 % --   07/09/21 2340 -- 98.2 °F (36.8 °C) Axillary -- -- -- --   07/09/21 2320 (!) 80/66 -- -- 84 -- 100 % --   07/09/21 2145 98/46 -- -- 74 -- 97 % --   07/09/21 2115 104/56 -- -- 77 -- 98 % --   07/09/21 2020 124/50 -- -- 86 -- 97 % --   07/09/21 2000 105/75 97.8 °F (36.6 °C) Axillary 86 23 98 % --       Intake/Output Summary (Last 24 hours) at 7/10/2021 1940  Last data filed at 7/10/2021 0400  Gross per 24 hour   Intake 631 ml   Output --   Net 631 ml     General: Nonnresponsive and ill-appearing  Chest:  Decreased breath sounds bilaterally  CVS: regular rate and rhythm  Abdominal: Soft nontender  Extremities: No edema bilaterally  Skin: Cool and dry      Labs:  Results from last 7 days   Lab Units 07/10/21  0341 07/09/21  0557 07/08/21  0830   WBC 10*3/mm3 16.28* 16.80* 10.66   HEMOGLOBIN g/dL 10.9* 11.8* 13.4   HEMATOCRIT % 33.9* 36.7 44.7   PLATELETS 10*3/mm3 218 179 199         Results from last 7 days   Lab Units 07/09/21  0317 07/08/21  0946 07/07/21  0533   SODIUM mmol/L 133* 129* 132*   POTASSIUM mmol/L 4.7 6.3* 5.0   CHLORIDE mmol/L 94* 91* 95*   CO2 mmol/L 23.0 19.0* 25.0   BUN mg/dL 18 29* 21   CREATININE mg/dL 2.97* 4.31* 3.69*   CALCIUM mg/dL 9.0 9.7 9.1   BILIRUBIN mg/dL 0.6 0.5  --    ALK PHOS U/L 221* 288*  --    ALT (SGPT) U/L 8 11  --    AST (SGOT) U/L 23 30  --    GLUCOSE mg/dL 151* 145* 98       Radiology:   Imaging Results (Last 72 Hours)     Procedure Component Value Units Date/Time    XR Chest 1 View [[id number]] Collected: 07/10/21 0746     Updated: 07/10/21 0751    Narrative:      EXAMINATION: XR CHEST 1 VW-      7/10/2021 3:05 AM CDT     HISTORY: vent; R41.82-Altered mental status, unspecified; A41.9-Sepsis,  unspecified organism; E86-Sllaysl effusion, not elsewhere classified;  R13.10-Dysphagia, unspecified; Z78.9-Other specified health status;  Z74.09-Other reduced mobility; I46.9-Cardiac arrest, cause unspecified     1 view chest x-ray.  Comparison is made with a one view chest x-ray from July 9, 2021.     Heart size is normal and unchanged.     The endotracheal tube remains in place with the tip 31 mm above the  herman.  The nasogastric tube remains in good position.     Interstitial lung disease with interval partial clearing of bilateral  lower lobe infiltrate or atelectasis.     Persistent right basilar consolidation and right pleural effusion.     Summary:  1. Partial clearing of bibasilar infiltrate.  2. Otherwise stable chest.        This report was finalized on 07/10/2021 07:48 by Dr. Dharmesh Zuleta MD.    XR Chest 1 View [[id number]] Collected: 07/09/21 1116     Updated: 07/09/21 1121    Narrative:      EXAMINATION: XR CHEST 1 VW-  7/9/2021 11:16 AM CDT 1 view     HISTORY: vent; R41.82-Altered mental status, unspecified; A41.9-Sepsis,  unspecified organism; O67-Dsjepuw effusion, not elsewhere classified;  R13.10-Dysphagia, unspecified; Z78.9-Other specified health status;  Z74.09-Other reduced mobility; I46.9-Cardiac arrest, cause unspecified     COMPARISON: 07/08/2021.     FINDINGS:   New enteric tube courses off the inferior margin of this study.  Endotracheal tube and vascular stent redemonstrated. RIGHT pleural  effusion again noted. No pneumothorax. Consolidative change seen in the  medial LEFT lung base, increased since the comparison study. Pulmonary  vascular congestion noted. Consolidation in the residual aerated RIGHT  lung as well.      No acute osseous abnormality. Chronic change to the proximal RIGHT  humerus again noted.          Impression:         1.  Slight worsening of consolidative change in  the LEFT lung base.     2.  RIGHT pleural effusion redemonstrated.     3.  Findings concerning for pulmonary edema and congestive heart  failure. Underlying pneumonia would be difficult to exclude.        This report was finalized on 07/09/2021 11:18 by Dr. Gordo Le MD.    XR Abdomen KUB [[id number]] Collected: 07/08/21 1846     Updated: 07/08/21 1850    Narrative:      EXAM: XR ABDOMEN KUB- - 7/8/2021 6:36 PM CDT     HISTORY: og placement; R41.82-Altered mental status, unspecified;  A41.9-Sepsis, unspecified organism; K83-Bkftnau effusion, not elsewhere  classified; R13.10-Dysphagia, unspecified; Z78.9-Other specified health  status; Z74.09-Other reduced mobility; I46.9-Cardiac arrest, cause  unspecified       COMPARISON: 7/8/2021.      TECHNIQUE:  1 images.  Supine view the upper abdomen     FINDINGS:    Similar right basilar opacity and effusion. Gastric tube tip projects at  mid gastric body. Endotracheal tube tip projects at the mid trachea, not  optimally evaluated on this exam. Probable cholecystectomy clips.          Impression:      1. Gastric tube tip projects at mid gastric body.  2. Similar right basilar opacity and effusion. Chest better evaluated on  same day chest radiograph.  This report was finalized on 07/08/2021 18:47 by Dr Carline Mcgovern MD.    XR Chest 1 View [[id number]] Collected: 07/08/21 0817     Updated: 07/08/21 0829    Narrative:      EXAMINATION: XR CHEST 1 VW-  7/8/2021 8:17 AM CDT 1 view     HISTORY: code blue; R41.82-Altered mental status, unspecified;  A41.9-Sepsis, unspecified organism; M98-Astbbpp effusion, not elsewhere  classified; R13.10-Dysphagia, unspecified; Z78.9-Other specified health  status; Z74.09-Other reduced mobility     COMPARISON: 06/30/2021.     FINDINGS:   Endotracheal tube tip is seen projecting 3.9 cm above the herman.  Vascular stent is seen in the LEFT upper chest. There is redemonstration  of what appears to be a large RIGHT pleural effusion, similar  to  06/30/2021. Pulmonary vascular congestion suspected bilaterally with  some mild interstitial markings at the periphery of the LEFT lung.      Extensive chronic posttraumatic changes to the proximal RIGHT humerus,  similar to the shoulder radiograph from 06/30/2021.          Impression:         1.  Very similar appearance of the chest when compared to the study from  06/30/2021. Large RIGHT pleural effusion.     2.  Findings of interstitial edema and pulmonary vascular congestion are  also again seen suggestive of mild/early changes of congestive heart  failure and pulmonary edema.     3.  Endotracheal tube as discussed above.        This report was finalized on 07/08/2021 08:20 by Dr. Gordo Le MD.          Culture:  Blood Culture   Date Value Ref Range Status   06/30/2021 No growth at 5 days  Final   06/30/2021 No growth at 5 days  Final         Assessment   End-stage renal disease  Metabolic encephalopathy  Type 2 diabetes   Hypertension with periods of hypotension  Anemia of CKD  COPD  C Diff colitis    Plan:  Completed the majorit of dialysis treatment today and then family decided to no longer proceed with dialysis  Nursing notes plans for comfort care and patient to underwent extubation earlier today  We will see again if plans change        Adi Gonzalez MD  7/10/2021  19:40 CDT

## 2021-07-11 NOTE — DISCHARGE SUMMARY
She passed on on July 10 at 2005-hour.  She had cardiorespiratory arrest on July 8 (asystole).  She had a return of spontaneous circulation after 10 minutes of resuscitation.  She likely suffered from hypoxic ischemic encephalopathy.  She has multiple medical comorbidities including end-stage renal disease on hemodialysis, C. difficile associated diarrhea and a recent Pseudomonas bacteremia.  She carries history of diabetes mellitus, hypertension.  She has chronic right pleural effusion.  Family decided to pursue comfort measure.  Patient was palliatively extubated yesterday.  She passed on peacefully.    Discharge diagnosis includes     status post cardiorespiratory arrest (asystole)  End-stage renal disease on hemodialysis  C. difficile associated diarrhea  Chronic anemia likely associated from chronic kidney disease  Diabetes mellitus type 2  Right pleural effusion chronic  History of hypertension  Hemoptysis possibly from traumatic intubation     How Severe Are Your Spot(S)?: mild Have Your Spot(S) Been Treated In The Past?: has not been treated Hpi Title: Evaluation of Skin Lesions Year Removed: 1900

## 2021-07-12 NOTE — THERAPY DISCHARGE NOTE
Acute Care - Occupational Therapy Discharge Summary  [facility]     Patient Name: Mini Gentile  : 1960  MRN: [id number]    Today's Date: 2021                 Admit Date: 2021        OT Recommendation and Plan    Visit Dx:    ICD-10-CM ICD-9-CM   1. Altered mental status, unspecified altered mental status type  R41.82 780.97   2. Sepsis, due to unspecified organism, unspecified whether acute organ dysfunction present (CMS/HCC)  A41.9 038.9     995.91   3. Pleural effusion  J90 511.9   4. Dysphagia, unspecified type  R13.10 787.20   5. Decreased activities of daily living (ADL)  Z78.9 V49.89   6. Impaired mobility  Z74.09 799.89   7. Cardiorespiratory arrest (CMS/HCC)  I46.9 427.5               OT Rehab Goals     Row Name 21 0700             Transfer Goal 1 (OT)    Activity/Assistive Device (Transfer Goal 1, OT)  sit-to-stand/stand-to-sit;bed-to-chair/chair-to-bed;toilet;shower chair  -TS      [place] Level/Cues Needed (Transfer Goal 1, OT)  supervision required  -TS      Time Frame (Transfer Goal 1, OT)  long term goal (LTG);10 days  -TS      Progress/Outcome (Transfer Goal 1, OT)  goal not met  -TS         Bathing Goal 1 (OT)    Activity/Device (Bathing Goal 1, OT)  bathing skills, all  -TS      [place] Level/Cues Needed (Bathing Goal 1, OT)  minimum assist (75% or more patient effort)  -TS      Time Frame (Bathing Goal 1, OT)  long term goal (LTG);10 days  -TS      Progress/Outcomes (Bathing Goal 1, OT)  goal not met  -TS         Dressing Goal 1 (OT)    Activity/Device (Dressing Goal 1, OT)  lower body dressing;upper body dressing  -TS      [place]/Cues Needed (Dressing Goal 1, OT)  minimum assist (75% or more patient effort)  -TS      Time Frame (Dressing Goal 1, OT)  long term goal (LTG);10 days  -TS      Progress/Outcome (Dressing Goal 1, OT)  goal not met  -TS         Toileting Goal 1 (OT)    Activity/Device (Toileting Goal 1, OT)  toileting skills, all;commode,  3-in-1  -TS      [place] Level/Cues Needed (Toileting Goal 1, OT)  minimum assist (75% or more patient effort)  -TS      Time Frame (Toileting Goal 1, OT)  long term goal (LTG);10 days  -TS      Progress/Outcome (Toileting Goal 1, OT)  goal not met  -TS        User Key  (r) = Recorded By, (t) = Taken By, (c) = Cosigned By    Initials Name Provider Type Discipline    TS Bijal Lindquist COTA/L Occupational Therapy Assistant OT              Timed Therapy Charges  Total Units: 4    Charges  Total Units: 4    Procedure Name Documented Minutes Units Code    HC OT SELF CARE/MGMT/TRAIN EA 15 MIN 54  4    85310 (CPT®)               Documented Minutes  Total Minutes: 54    Therapy Provided Minutes    17609 - OT Self Care/Mgmt Minutes 54                    OT Discharge Summary  Anticipated Discharge Disposition (OT): other (see comments) (pt )  Reason for Discharge: Patient   Outcomes Achieved: Refer to plan of care for updates on goals achieved  Discharge Destination: other (comment) (pt )      TAMIKO Bang  2021    no

## 2021-07-13 DIAGNOSIS — I10 ESSENTIAL HYPERTENSION: ICD-10-CM

## 2021-07-13 RX ORDER — LOSARTAN POTASSIUM 50 MG/1
TABLET ORAL
Qty: 30 TABLET | OUTPATIENT
Start: 2021-07-13

## 2023-10-17 NOTE — PROGRESS NOTES
"1           [facility] Medicine Services  INPATIENT PROGRESS NOTE    Patient Name: Mini Gentile  Date of Admission: 6/16/2021  Today's Date: 06/23/21  Length of Stay: 7  Primary Care Physician: Bharati Dahl APRN    Subjective   Chief Complaint: Follow-up  HPI   \"Me and my  decided that it is best for me to go to where old people are (skilled nursing facility). \"  Patient states that she only had 2 bowel movements since yesterday  Denies any abdominal pain  Denies any nausea or vomiting  Afebrile        Review of Systems     All pertinent negatives and positives are as above. All other systems have been reviewed and are negative unless otherwise stated.     Objective    Temp:  [97.6 °F (36.4 °C)-98.2 °F (36.8 °C)] 98 °F (36.7 °C)  Heart Rate:  [70-82] 82  Resp:  [16-20] 18  BP: (131-167)/(64-73) 167/73  Physical Exam   She was seated reclined on the chair.  With that kind of position she had, I thought that her belly was distended.  After repositioning, it looks similar from previous times I had seen her.  Pleasant woman  No distress  Alert and oriented x3  Anicteric sclera  Normocephalic and atraumatic head  No thyromegaly  Lungs are clear to auscultation; good air exchange  S1-S2, regular rate and rhythm; murmur present  Soft abdomen, nontender, no guarding, no gross organomegaly  No cyanosis or gross edema  Warm dry skin with good capillary refill time  Has a bruise on left elbow  No significant change from yesterday's exam    Results Review:  I have reviewed the labs, radiology results, and diagnostic studies.    Laboratory Data:   Results from last 7 days   Lab Units 06/22/21  0436 06/19/21  0550 06/18/21  0250   WBC 10*3/mm3 5.35 7.47 8.02   HEMOGLOBIN g/dL 10.4* 9.2* 8.9*   HEMATOCRIT % 34.7 30.1* 29.4*   PLATELETS 10*3/mm3 195 121* 126*        Results from last 7 days   Lab Units 06/22/21  0436 06/21/21  0551 06/20/21  0625 06/17/21  0609 06/16/21  1706 " 06/16/21  1245   SODIUM mmol/L 131* 133* 132* 135* 137 137   POTASSIUM mmol/L 3.9 4.0 4.2 3.8 3.1* 3.1*   CHLORIDE mmol/L 92* 94* 94* 94* 95* 94*   CO2 mmol/L 28.0 28.0 25.0 28.0 28.0 28.0   BUN mg/dL 28* 22 16 30* 25* 25*   CREATININE mg/dL 4.09* 3.59* 2.43* 4.26* 4.06* 3.68*   CALCIUM mg/dL 8.9 9.1 9.2 9.2 9.1 9.0   BILIRUBIN mg/dL  --   --   --  0.8 0.6 0.6   ALK PHOS U/L  --   --   --  200* 199* 222*   ALT (SGPT) U/L  --   --   --  18 19 20   AST (SGOT) U/L  --   --   --  29 36* 36*   GLUCOSE mg/dL 178* 165* 181* 97 113* 150*       Culture Data:   Blood Culture   Date Value Ref Range Status   06/19/2021 No growth at 4 days  Preliminary   06/16/2021 Pseudomonas aeruginosa (C)  Final     Comment:     Refer to previous blood culture collected 6/16/2021 at 1840 for VICKEY's.     06/16/2021 Pseudomonas aeruginosa (C)  Final     Urine Culture   Date Value Ref Range Status   06/16/2021 >100,000 CFU/mL Mixed Janeth Isolated  Final       Radiology Data:   Imaging Results (Last 24 Hours)     ** No results found for the last 24 hours. **          I have reviewed the patient's current medications.     Assessment/Plan     Active Hospital Problems    Diagnosis    • **C. difficile diarrhea    • Bacteremia due to Pseudomonas    • Diarrhea    • Hypokalemia    • Altered mental status    • DM2 (diabetes mellitus, type 2) (CMS/[facility])    • End stage renal failure on dialysis (CMS/[facility])    • Diabetes (CMS/[facility])    • HTN (hypertension)           Problem list  C. difficile associated diarrhea in the setting of recent antibiotic use for aspiration pneumonia.  Started on vancomycin p.o. on June 17  Pseudomonas bacteremia -started on Merrem on June 20 with plan of 5-day course.  She was on cefepime prior to this.  Suspected that this is transient via GI source per ID.  ID suspects that this was transient and does not feel extended course of treatment will be necessary particularly given her C. difficile.  Altered mental status felt metabolic  encephalopathy in the setting of infection and dialysis.  She is back to her baseline  Hypotension resolved  Hypokalemia/hyponatremia in the setting of end-stage renal disease on dialysis  End-stage renal disease on dialysis; anemia of chronic kidney disease on ELVIS/iron.  Diabetes mellitus type 2 -Accu-Chek been 212.  No evidence of hypoglycemia       ID recommends continuing Merrem through June 24 with plan to DC after that days dose.  It is recommended as well to continue oral vancomycin through June 28.  ID signed off as of yesterday.  Maintenance dialysis per nephrology on [facility]  Discussed with social service regarding discharge screen.    aspirin, 81 mg, Oral, Daily  atropine, 1 drop, Left Eye, Daily  carvedilol, 6.25 mg, Oral, BID With Meals  cholecalciferol, 2,000 Units, Oral, Daily  epoetin vika-epbx, 10,000 Units, Subcutaneous, Once per day on Tue Thu Sat  famotidine, 20 mg, Oral, Once per day on Tue Thu Sat  Ferric Citrate, 4 tablet, Oral, TID With Meals  fluticasone, 2 spray, Nasal, BID  insulin lispro, 2-7 Units, Subcutaneous, TID AC  lactobacillus acidophilus, 1 capsule, Oral, Daily  meropenem, 500 mg, Intravenous, Q24H  pravastatin, 10 mg, Oral, Nightly  sertraline, 25 mg, Oral, Daily  sodium chloride, 10 mL, Intravenous, Q12H  timolol, 1 drop, Left Eye, Daily  vancomycin, 125 mg, Oral, Q6H              Discharge Planning: Anticipation of completion of Merrem after June 24 dose at around noontime.  Anticipated that she is going to have dialysis and likely after these 2 things are accomplished she may be able to transfer back to skilled nursing facility once cleared from social service    Electronically signed by Sundeep Aldridge MD, 06/23/21, 10:00 CDT.     yes
